# Patient Record
Sex: FEMALE | Race: WHITE | NOT HISPANIC OR LATINO
[De-identification: names, ages, dates, MRNs, and addresses within clinical notes are randomized per-mention and may not be internally consistent; named-entity substitution may affect disease eponyms.]

---

## 2015-07-21 RX ORDER — AMLODIPINE BESYLATE 2.5 MG/1
1 TABLET ORAL
Qty: 0 | Refills: 0 | COMMUNITY
Start: 2015-07-21

## 2017-01-03 ENCOUNTER — RX RENEWAL (OUTPATIENT)
Age: 82
End: 2017-01-03

## 2017-01-06 LAB — INR PPP: 0.98

## 2017-01-10 LAB — INR PPP: 1.13

## 2017-01-17 LAB — INR PPP: 1.15

## 2017-01-24 LAB — INR PPP: 1.13

## 2017-02-01 LAB — INR PPP: 1.7

## 2017-02-07 LAB — INR PPP: 2.47

## 2017-02-15 LAB — INR PPP: 1.78

## 2017-02-26 LAB — INR PPP: 2

## 2017-03-13 LAB — INR PPP: 2.65

## 2017-04-10 LAB — INR PPP: 6.26

## 2017-04-14 LAB — INR PPP: 3.23

## 2017-04-18 ENCOUNTER — OUTPATIENT (OUTPATIENT)
Dept: OUTPATIENT SERVICES | Facility: HOSPITAL | Age: 82
LOS: 1 days | End: 2017-04-18
Payer: MEDICARE

## 2017-04-18 VITALS
DIASTOLIC BLOOD PRESSURE: 82 MMHG | SYSTOLIC BLOOD PRESSURE: 145 MMHG | RESPIRATION RATE: 16 BRPM | TEMPERATURE: 99 F | WEIGHT: 110.01 LBS | HEART RATE: 88 BPM

## 2017-04-18 DIAGNOSIS — Z98.89 OTHER SPECIFIED POSTPROCEDURAL STATES: Chronic | ICD-10-CM

## 2017-04-18 DIAGNOSIS — N20.0 CALCULUS OF KIDNEY: Chronic | ICD-10-CM

## 2017-04-18 DIAGNOSIS — Z96.7 PRESENCE OF OTHER BONE AND TENDON IMPLANTS: Chronic | ICD-10-CM

## 2017-04-18 DIAGNOSIS — D21.22 BENIGN NEOPLASM OF CONNECTIVE AND OTHER SOFT TISSUE OF LEFT LOWER LIMB, INCLUDING HIP: ICD-10-CM

## 2017-04-18 DIAGNOSIS — Z01.818 ENCOUNTER FOR OTHER PREPROCEDURAL EXAMINATION: ICD-10-CM

## 2017-04-18 LAB
ALBUMIN SERPL ELPH-MCNC: 3.5 G/DL — SIGNIFICANT CHANGE UP (ref 3.3–5)
ALP SERPL-CCNC: 80 U/L — SIGNIFICANT CHANGE UP (ref 40–120)
ALT FLD-CCNC: 24 U/L — SIGNIFICANT CHANGE UP (ref 12–78)
ANION GAP SERPL CALC-SCNC: 10 MMOL/L — SIGNIFICANT CHANGE UP (ref 5–17)
AST SERPL-CCNC: 19 U/L — SIGNIFICANT CHANGE UP (ref 15–37)
BILIRUB SERPL-MCNC: 1.2 MG/DL — SIGNIFICANT CHANGE UP (ref 0.2–1.2)
BUN SERPL-MCNC: 28 MG/DL — HIGH (ref 7–23)
CALCIUM SERPL-MCNC: 8.8 MG/DL — SIGNIFICANT CHANGE UP (ref 8.5–10.1)
CHLORIDE SERPL-SCNC: 107 MMOL/L — SIGNIFICANT CHANGE UP (ref 96–108)
CO2 SERPL-SCNC: 24 MMOL/L — SIGNIFICANT CHANGE UP (ref 22–31)
CREAT SERPL-MCNC: 1.2 MG/DL — SIGNIFICANT CHANGE UP (ref 0.5–1.3)
GLUCOSE SERPL-MCNC: 85 MG/DL — SIGNIFICANT CHANGE UP (ref 70–99)
HCT VFR BLD CALC: 35.5 % — SIGNIFICANT CHANGE UP (ref 34.5–45)
HGB BLD-MCNC: 11.2 G/DL — LOW (ref 11.5–15.5)
MCHC RBC-ENTMCNC: 29.7 PG — SIGNIFICANT CHANGE UP (ref 27–34)
MCHC RBC-ENTMCNC: 31.6 GM/DL — LOW (ref 32–36)
MCV RBC AUTO: 94 FL — SIGNIFICANT CHANGE UP (ref 80–100)
PLATELET # BLD AUTO: 177 K/UL — SIGNIFICANT CHANGE UP (ref 150–400)
POTASSIUM SERPL-MCNC: 3.7 MMOL/L — SIGNIFICANT CHANGE UP (ref 3.5–5.3)
POTASSIUM SERPL-SCNC: 3.7 MMOL/L — SIGNIFICANT CHANGE UP (ref 3.5–5.3)
PROT SERPL-MCNC: 7 G/DL — SIGNIFICANT CHANGE UP (ref 6–8.3)
RBC # BLD: 3.78 M/UL — LOW (ref 3.8–5.2)
RBC # FLD: 13.2 % — SIGNIFICANT CHANGE UP (ref 10.3–14.5)
SODIUM SERPL-SCNC: 141 MMOL/L — SIGNIFICANT CHANGE UP (ref 135–145)
WBC # BLD: 9 K/UL — SIGNIFICANT CHANGE UP (ref 3.8–10.5)
WBC # FLD AUTO: 9 K/UL — SIGNIFICANT CHANGE UP (ref 3.8–10.5)

## 2017-04-18 PROCEDURE — 93005 ELECTROCARDIOGRAM TRACING: CPT

## 2017-04-18 PROCEDURE — 71020: CPT | Mod: 26

## 2017-04-18 PROCEDURE — 93010 ELECTROCARDIOGRAM REPORT: CPT | Mod: NC

## 2017-04-18 PROCEDURE — 85027 COMPLETE CBC AUTOMATED: CPT

## 2017-04-18 PROCEDURE — 80053 COMPREHEN METABOLIC PANEL: CPT

## 2017-04-18 PROCEDURE — G0463: CPT

## 2017-04-18 PROCEDURE — 71046 X-RAY EXAM CHEST 2 VIEWS: CPT

## 2017-04-18 RX ORDER — ATORVASTATIN CALCIUM 80 MG/1
1 TABLET, FILM COATED ORAL
Qty: 0 | Refills: 0 | COMMUNITY

## 2017-04-18 NOTE — H&P PST ADULT - PMH
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

## 2017-04-18 NOTE — H&P PST ADULT - NSANTHOSAYNRD_GEN_A_CORE
No. YVONNE screening performed.  STOP BANG Legend: 0-2 = LOW Risk; 3-4 = INTERMEDIATE Risk; 5-8 = HIGH Risk

## 2017-04-18 NOTE — H&P PST ADULT - HISTORY OF PRESENT ILLNESS
Pt first reports to "noticing a growth to left hip area about 2 weeks ago". Pt complaining of "mild discomfort when I touch the growth". Pt denies mass increasing in size. Pt denies inflammation and infection. Pt denies drainage from mass. 83yo female with PMH of AFib, HTN and right leg neuropathy here for PST. Pt first reports to "noticing a growth to left hip area about 2 months ago". Pt complaining of "mild discomfort when I touch the growth". Pt denies mass increasing in size. Pt denies inflammation and infection. Pt denies drainage from mass. Pt electing for excision left hip mass on 4/25/17.

## 2017-04-18 NOTE — H&P PST ADULT - PROBLEM SELECTOR PLAN 1
-admit to general med floors  -acute, INR of 10.16  -Hold AC, given oral vitK  -F/U AM labs  -F/U PT, INR, PTT in AM  -diet Dash/Tlc  -cardio consult- Dr. Laughlin appec recs Excision left hip mass on 4/25/17.   Cardiac clearance needed.   CBC, Comprehensive panel and EKG and CXR ordered.   Pre-op instructions and surgical scrubs given and pt verbalized understanding.

## 2017-04-18 NOTE — H&P PST ADULT - ASSESSMENT
83yo female with benign neoplasm of connective tissue and other soft tissue of left lower limb, including hip.

## 2017-04-18 NOTE — H&P PST ADULT - PSH
Elective surgery  ("Twisted bowel", 2014)  Elective surgery  (Exision of cyst on liver, 1985)  H/O cerebral aneurysm repair  Brain clips (1978(  PVD (peripheral vascular disease)  s/p RLE bypass x 3, most recent 3/2012 Right external iliac to PT bypass w/ PTFE (2012)  Renal stone  Cysto stent placement 10/1/2014  S/P cataract surgery  (Left eye)  S/P ORIF (open reduction internal fixation) fracture  Left hip fx (2012) & R hip fx (2013)  S/P FRED (total abdominal hysterectomy)  (1987) Elective surgery  ("Twisted bowel", 2014)  Elective surgery  (Exision of cyst on liver, 1985)  H/O cerebral aneurysm repair  Brain clips (1978(  PVD (peripheral vascular disease)  s/p RLE bypass x 3, most recent 3/2012 Right external iliac to PT bypass w/ PTFE (2012)  Renal stone  Cysto stent placement 10/1/2014  S/P cataract surgery  (Left eye)  S/P ORIF (open reduction internal fixation) fracture  Left hip fx (2012) & R hip fx (2013)  S/P FRED (total abdominal hysterectomy)  (1987, Hx of "ovarian cancer?")

## 2017-04-18 NOTE — H&P PST ADULT - GASTROINTESTINAL DETAILS
bowel sounds normal/no guarding/normal/no distention/no rebound tenderness/no rigidity/soft/nontender

## 2017-04-18 NOTE — H&P PST ADULT - RS GEN PE MLT RESP DETAILS PC
respirations non-labored/no wheezes/breath sounds equal/normal/good air movement/airway patent/no rhonchi/no rales

## 2017-04-18 NOTE — H&P PST ADULT - NEUROLOGICAL COMMENTS
Right lower right lower neuropathy Right lower right leg neuropathy - "chronic after vascular surgery"

## 2017-04-18 NOTE — H&P PST ADULT - NEGATIVE CARDIOVASCULAR SYMPTOMS
no paroxysmal nocturnal dyspnea/no palpitations/no orthopnea/no peripheral edema/no claudication/no chest pain/no dyspnea on exertion

## 2017-04-18 NOTE — H&P PST ADULT - NEUROLOGICAL
details… detailed exam Alert & oriented; no sensory, motor or coordination deficits, normal reflexes

## 2017-04-18 NOTE — H&P PST ADULT - MUSCULOSKELETAL
details… detailed exam normal strength/normal/no calf tenderness No joint pain, swelling or deformity; no limitation of movement

## 2017-04-19 ENCOUNTER — NON-APPOINTMENT (OUTPATIENT)
Age: 82
End: 2017-04-19

## 2017-04-19 ENCOUNTER — APPOINTMENT (OUTPATIENT)
Dept: CARDIOLOGY | Facility: CLINIC | Age: 82
End: 2017-04-19

## 2017-04-19 VITALS
WEIGHT: 112 LBS | BODY MASS INDEX: 21.14 KG/M2 | DIASTOLIC BLOOD PRESSURE: 78 MMHG | HEART RATE: 83 BPM | HEIGHT: 61 IN | OXYGEN SATURATION: 96 % | SYSTOLIC BLOOD PRESSURE: 136 MMHG

## 2017-04-20 PROBLEM — I34.1 NONRHEUMATIC MITRAL (VALVE) PROLAPSE: Chronic | Status: ACTIVE | Noted: 2017-04-18

## 2017-04-20 PROBLEM — D21.9 BENIGN NEOPLASM OF CONNECTIVE AND OTHER SOFT TISSUE, UNSPECIFIED: Chronic | Status: ACTIVE | Noted: 2017-04-18

## 2017-04-21 LAB — INR PPP: 1.13

## 2017-04-24 ENCOUNTER — RESULT REVIEW (OUTPATIENT)
Age: 82
End: 2017-04-24

## 2017-04-24 RX ORDER — SODIUM CHLORIDE 9 MG/ML
1000 INJECTION, SOLUTION INTRAVENOUS
Qty: 0 | Refills: 0 | Status: DISCONTINUED | OUTPATIENT
Start: 2017-04-25 | End: 2017-04-25

## 2017-04-25 ENCOUNTER — TRANSCRIPTION ENCOUNTER (OUTPATIENT)
Age: 82
End: 2017-04-25

## 2017-04-25 ENCOUNTER — OUTPATIENT (OUTPATIENT)
Dept: OUTPATIENT SERVICES | Facility: HOSPITAL | Age: 82
LOS: 1 days | Discharge: HOME | End: 2017-04-25
Payer: MEDICARE

## 2017-04-25 VITALS
TEMPERATURE: 98 F | SYSTOLIC BLOOD PRESSURE: 122 MMHG | DIASTOLIC BLOOD PRESSURE: 68 MMHG | OXYGEN SATURATION: 99 % | HEART RATE: 64 BPM | RESPIRATION RATE: 16 BRPM

## 2017-04-25 DIAGNOSIS — Z98.89 OTHER SPECIFIED POSTPROCEDURAL STATES: Chronic | ICD-10-CM

## 2017-04-25 DIAGNOSIS — D21.22 BENIGN NEOPLASM OF CONNECTIVE AND OTHER SOFT TISSUE OF LEFT LOWER LIMB, INCLUDING HIP: ICD-10-CM

## 2017-04-25 DIAGNOSIS — N20.0 CALCULUS OF KIDNEY: Chronic | ICD-10-CM

## 2017-04-25 DIAGNOSIS — Z41.9 ENCOUNTER FOR PROCEDURE FOR PURPOSES OTHER THAN REMEDYING HEALTH STATE, UNSPECIFIED: Chronic | ICD-10-CM

## 2017-04-25 DIAGNOSIS — Z98.49 CATARACT EXTRACTION STATUS, UNSPECIFIED EYE: Chronic | ICD-10-CM

## 2017-04-25 DIAGNOSIS — Z96.7 PRESENCE OF OTHER BONE AND TENDON IMPLANTS: Chronic | ICD-10-CM

## 2017-04-25 DIAGNOSIS — D21.9 BENIGN NEOPLASM OF CONNECTIVE AND OTHER SOFT TISSUE, UNSPECIFIED: ICD-10-CM

## 2017-04-25 LAB
APTT BLD: 32.9 SEC — SIGNIFICANT CHANGE UP (ref 27.5–37.4)
INR BLD: 1.04 RATIO — SIGNIFICANT CHANGE UP (ref 0.88–1.16)
PROTHROM AB SERPL-ACNC: 11.3 SEC — SIGNIFICANT CHANGE UP (ref 9.8–12.7)

## 2017-04-25 PROCEDURE — 88304 TISSUE EXAM BY PATHOLOGIST: CPT | Mod: 26

## 2017-04-25 PROCEDURE — 27045 EXC HIP/PELV TUM DEEP 5 CM/>: CPT

## 2017-04-25 PROCEDURE — 88304 TISSUE EXAM BY PATHOLOGIST: CPT

## 2017-04-25 PROCEDURE — 85610 PROTHROMBIN TIME: CPT

## 2017-04-25 PROCEDURE — 85730 THROMBOPLASTIN TIME PARTIAL: CPT

## 2017-04-25 RX ORDER — CEFAZOLIN SODIUM 1 G
1000 VIAL (EA) INJECTION ONCE
Qty: 0 | Refills: 0 | Status: COMPLETED | OUTPATIENT
Start: 2017-04-25 | End: 2017-04-25

## 2017-04-25 RX ORDER — SODIUM CHLORIDE 9 MG/ML
1000 INJECTION, SOLUTION INTRAVENOUS
Qty: 0 | Refills: 0 | Status: DISCONTINUED | OUTPATIENT
Start: 2017-04-25 | End: 2017-04-25

## 2017-04-25 RX ORDER — ONDANSETRON 8 MG/1
4 TABLET, FILM COATED ORAL ONCE
Qty: 0 | Refills: 0 | Status: DISCONTINUED | OUTPATIENT
Start: 2017-04-25 | End: 2017-04-25

## 2017-04-25 RX ORDER — MORPHINE SULFATE 50 MG/1
2 CAPSULE, EXTENDED RELEASE ORAL
Qty: 0 | Refills: 0 | Status: DISCONTINUED | OUTPATIENT
Start: 2017-04-25 | End: 2017-04-25

## 2017-04-25 RX ADMIN — SODIUM CHLORIDE 40 MILLILITER(S): 9 INJECTION, SOLUTION INTRAVENOUS at 09:13

## 2017-04-25 RX ADMIN — SODIUM CHLORIDE 50 MILLILITER(S): 9 INJECTION, SOLUTION INTRAVENOUS at 12:05

## 2017-04-25 NOTE — ASU DISCHARGE PLAN (ADULT/PEDIATRIC). - SPECIAL INSTRUCTIONS
Keep dressing clean, dry and intact x 24 hrs. Remove dressing after 24 hrs and begin showering as usual. Do NOT remove steri-strips. Do not scrub or soak incision site. Pat dry. NO tub baths, NO swimming pools. Apply waterproof ice pack to incision area 20 mins on, 20 mins off to help decrease pain and swelling.

## 2017-04-25 NOTE — ASU DISCHARGE PLAN (ADULT/PEDIATRIC). - NURSING INSTRUCTIONS
Wash hands frequently.  Continue deep breathing and coughing as demonstrated and practiced in recovery room.

## 2017-04-25 NOTE — ASU DISCHARGE PLAN (ADULT/PEDIATRIC). - MEDICATION SUMMARY - MEDICATIONS TO TAKE
I will START or STAY ON the medications listed below when I get home from the hospital:    Rolling Walker: Diagnosis: Weakness  -- Indication: For Home equipment    traMADol 50 mg oral tablet  --  by mouth 2 times a day  -- Indication: For Home med/Pain med    aspirin 81 mg oral delayed release tablet  -- 1 tab(s) by mouth once a day  -- Indication: For Home med    losartan 50 mg oral tablet  -- 1 tab(s) by mouth once a day (at bedtime)  -- Indication: For Home med    warfarin 2.5 mg oral tablet  --  by mouth once a day (at bedtime)  -- Indication: For Home med    gabapentin 100 mg oral capsule  --  by mouth 2 times a day  -- Indication: For Home med    atorvastatin 40 mg oral tablet  -- 1 tab(s) by mouth once a day (at bedtime)  -- Indication: For Home med    metoprolol tartrate 100 mg oral tablet  -- 1 tab(s) by mouth 2 times a day  -- Indication: For Home med    amLODIPine 5 mg oral tablet  -- 1 tab(s) by mouth once a day (at bedtime)  -- Indication: For Home med    omeprazole 40 mg oral delayed release capsule  -- 1 cap(s) by mouth once a day (at bedtime)  -- Indication: For Home med    multivitamin  --   once a day  -- Indication: For Home med I will START or STAY ON the medications listed below when I get home from the hospital:    Rolling Walker: Diagnosis: Weakness  -- Indication: For Home equipment    traMADol 50 mg oral tablet  --  by mouth 2 times a day  -- Indication: For Home med/Pain med    aspirin 81 mg oral delayed release tablet  -- 1 tab(s) by mouth once a day  -- Indication: For Home med    acetaminophen-hydrocodone 325 mg-5 mg oral tablet  -- 1 tab(s) by mouth every 4-6 hours as needed for pain. MDD:4  -- Caution federal law prohibits the transfer of this drug to any person other  than the person for whom it was prescribed.  May cause drowsiness.  Alcohol may intensify this effect.  Use care when operating dangerous machinery.  This product contains acetaminophen.  Do not use  with any other product containing acetaminophen to prevent possible liver damage.  Using more of this medication than prescribed may cause serious breathing problems.    -- Indication: For Pain med- DO NOT TAKE WITH TRAMADOL    Tylenol 325 mg oral tablet  -- 2 tab(s) by mouth every 4 hours as needed for mild pain. (** Max daily dose of Tylenol should not exceed 3200mg/24 hrs-Note Tylenol in Norco)  -- Indication: For Mild pain    losartan 50 mg oral tablet  -- 1 tab(s) by mouth once a day (at bedtime)  -- Indication: For Home med    Milk of Magnesia  --  30 cc by mouth daily as needed for constipation while on norco for pain.  -- Indication: For Constipation    warfarin 2.5 mg oral tablet  --  by mouth once a day (at bedtime)  -- Indication: For Home med    gabapentin 100 mg oral capsule  --  by mouth 2 times a day  -- Indication: For Home med    atorvastatin 40 mg oral tablet  -- 1 tab(s) by mouth once a day (at bedtime)  -- Indication: For Home med    metoprolol tartrate 100 mg oral tablet  -- 1 tab(s) by mouth 2 times a day  -- Indication: For Home med    amLODIPine 5 mg oral tablet  -- 1 tab(s) by mouth once a day (at bedtime)  -- Indication: For Home med    omeprazole 40 mg oral delayed release capsule  -- 1 cap(s) by mouth once a day (at bedtime)  -- Indication: For Home med    multivitamin  --   once a day  -- Indication: For Home med

## 2017-04-25 NOTE — ASU DISCHARGE PLAN (ADULT/PEDIATRIC). - NOTIFY
Pain not relieved by Medications/Swelling that continues/Persistent Nausea and Vomiting/Bleeding that does not stop/Fever greater than 101 Persistent Nausea and Vomiting/Bleeding that does not stop/Increased Irritability or Sluggishness/Fever greater than 101/Inability to Tolerate Liquids or Foods/Swelling that continues/Pain not relieved by Medications

## 2017-04-25 NOTE — ASU PATIENT PROFILE, ADULT - PMH
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

## 2017-04-25 NOTE — ASU PATIENT PROFILE, ADULT - PSH
Elective surgery  ("Twisted bowel", 2014)  Elective surgery  (Exision of cyst on liver, 1985)  H/O cerebral aneurysm repair  Brain clips (1978(  PVD (peripheral vascular disease)  s/p RLE bypass x 3, most recent 3/2012 Right external iliac to PT bypass w/ PTFE (2012)  Renal stone  Cysto stent placement 10/1/2014  S/P cataract surgery  (Left eye)  S/P ORIF (open reduction internal fixation) fracture  Left hip fx (2012) & R hip fx (2013)  S/P FRED (total abdominal hysterectomy)  (1987, Hx of "ovarian cancer?")

## 2017-04-26 LAB — SURGICAL PATHOLOGY FINAL REPORT - CH: SIGNIFICANT CHANGE UP

## 2017-04-28 DIAGNOSIS — Z87.891 PERSONAL HISTORY OF NICOTINE DEPENDENCE: ICD-10-CM

## 2017-04-28 DIAGNOSIS — I48.0 PAROXYSMAL ATRIAL FIBRILLATION: ICD-10-CM

## 2017-04-28 DIAGNOSIS — M06.9 RHEUMATOID ARTHRITIS, UNSPECIFIED: ICD-10-CM

## 2017-04-28 DIAGNOSIS — I10 ESSENTIAL (PRIMARY) HYPERTENSION: ICD-10-CM

## 2017-04-28 DIAGNOSIS — E78.00 PURE HYPERCHOLESTEROLEMIA, UNSPECIFIED: ICD-10-CM

## 2017-04-28 DIAGNOSIS — J45.909 UNSPECIFIED ASTHMA, UNCOMPLICATED: ICD-10-CM

## 2017-04-28 DIAGNOSIS — D21.22 BENIGN NEOPLASM OF CONNECTIVE AND OTHER SOFT TISSUE OF LEFT LOWER LIMB, INCLUDING HIP: ICD-10-CM

## 2017-04-28 DIAGNOSIS — I34.0 NONRHEUMATIC MITRAL (VALVE) INSUFFICIENCY: ICD-10-CM

## 2017-04-28 DIAGNOSIS — E78.5 HYPERLIPIDEMIA, UNSPECIFIED: ICD-10-CM

## 2017-04-28 DIAGNOSIS — Z79.01 LONG TERM (CURRENT) USE OF ANTICOAGULANTS: ICD-10-CM

## 2017-04-28 DIAGNOSIS — Z79.82 LONG TERM (CURRENT) USE OF ASPIRIN: ICD-10-CM

## 2017-04-28 DIAGNOSIS — Z86.73 PERSONAL HISTORY OF TRANSIENT ISCHEMIC ATTACK (TIA), AND CEREBRAL INFARCTION WITHOUT RESIDUAL DEFICITS: ICD-10-CM

## 2017-05-03 ENCOUNTER — APPOINTMENT (OUTPATIENT)
Dept: CARDIOLOGY | Facility: CLINIC | Age: 82
End: 2017-05-03

## 2017-05-05 LAB — INR PPP: 1.28

## 2017-05-11 LAB — INR PPP: 3.34

## 2017-05-18 LAB — INR PPP: 6.29

## 2017-05-23 LAB — INR PPP: 1.88

## 2017-05-31 LAB — INR PPP: 1.26

## 2017-06-14 ENCOUNTER — INPATIENT (INPATIENT)
Facility: HOSPITAL | Age: 82
LOS: 1 days | Discharge: HOME | DRG: 301 | End: 2017-06-16
Attending: FAMILY MEDICINE | Admitting: FAMILY MEDICINE
Payer: MEDICARE

## 2017-06-14 VITALS
OXYGEN SATURATION: 97 % | RESPIRATION RATE: 14 BRPM | WEIGHT: 110.01 LBS | SYSTOLIC BLOOD PRESSURE: 117 MMHG | DIASTOLIC BLOOD PRESSURE: 81 MMHG | HEART RATE: 71 BPM | TEMPERATURE: 98 F | HEIGHT: 60 IN

## 2017-06-14 DIAGNOSIS — Z29.9 ENCOUNTER FOR PROPHYLACTIC MEASURES, UNSPECIFIED: ICD-10-CM

## 2017-06-14 DIAGNOSIS — Z98.89 OTHER SPECIFIED POSTPROCEDURAL STATES: Chronic | ICD-10-CM

## 2017-06-14 DIAGNOSIS — I48.91 UNSPECIFIED ATRIAL FIBRILLATION: ICD-10-CM

## 2017-06-14 DIAGNOSIS — R53.1 WEAKNESS: ICD-10-CM

## 2017-06-14 DIAGNOSIS — N32.81 OVERACTIVE BLADDER: ICD-10-CM

## 2017-06-14 DIAGNOSIS — I10 ESSENTIAL (PRIMARY) HYPERTENSION: ICD-10-CM

## 2017-06-14 DIAGNOSIS — Z41.9 ENCOUNTER FOR PROCEDURE FOR PURPOSES OTHER THAN REMEDYING HEALTH STATE, UNSPECIFIED: Chronic | ICD-10-CM

## 2017-06-14 DIAGNOSIS — Z98.49 CATARACT EXTRACTION STATUS, UNSPECIFIED EYE: Chronic | ICD-10-CM

## 2017-06-14 DIAGNOSIS — M06.9 RHEUMATOID ARTHRITIS, UNSPECIFIED: ICD-10-CM

## 2017-06-14 DIAGNOSIS — Z96.7 PRESENCE OF OTHER BONE AND TENDON IMPLANTS: Chronic | ICD-10-CM

## 2017-06-14 DIAGNOSIS — N20.0 CALCULUS OF KIDNEY: Chronic | ICD-10-CM

## 2017-06-14 DIAGNOSIS — I63.9 CEREBRAL INFARCTION, UNSPECIFIED: ICD-10-CM

## 2017-06-14 DIAGNOSIS — K21.9 GASTRO-ESOPHAGEAL REFLUX DISEASE WITHOUT ESOPHAGITIS: ICD-10-CM

## 2017-06-14 DIAGNOSIS — I74.3 EMBOLISM AND THROMBOSIS OF ARTERIES OF THE LOWER EXTREMITIES: ICD-10-CM

## 2017-06-14 DIAGNOSIS — E78.5 HYPERLIPIDEMIA, UNSPECIFIED: ICD-10-CM

## 2017-06-14 LAB
ALBUMIN SERPL ELPH-MCNC: 3.4 G/DL — SIGNIFICANT CHANGE UP (ref 3.3–5)
ALP SERPL-CCNC: 73 U/L — SIGNIFICANT CHANGE UP (ref 40–120)
ALT FLD-CCNC: 15 U/L — SIGNIFICANT CHANGE UP (ref 12–78)
ANION GAP SERPL CALC-SCNC: 7 MMOL/L — SIGNIFICANT CHANGE UP (ref 5–17)
APPEARANCE UR: CLEAR — SIGNIFICANT CHANGE UP
APTT BLD: 45.3 SEC — HIGH (ref 27.5–37.4)
AST SERPL-CCNC: 17 U/L — SIGNIFICANT CHANGE UP (ref 15–37)
BACTERIA # UR AUTO: ABNORMAL
BASOPHILS # BLD AUTO: 0.1 K/UL — SIGNIFICANT CHANGE UP (ref 0–0.2)
BASOPHILS NFR BLD AUTO: 0.9 % — SIGNIFICANT CHANGE UP (ref 0–2)
BILIRUB SERPL-MCNC: 1.1 MG/DL — SIGNIFICANT CHANGE UP (ref 0.2–1.2)
BILIRUB UR-MCNC: NEGATIVE — SIGNIFICANT CHANGE UP
BUN SERPL-MCNC: 36 MG/DL — HIGH (ref 7–23)
CALCIUM SERPL-MCNC: 8.8 MG/DL — SIGNIFICANT CHANGE UP (ref 8.5–10.1)
CHLORIDE SERPL-SCNC: 106 MMOL/L — SIGNIFICANT CHANGE UP (ref 96–108)
CO2 SERPL-SCNC: 24 MMOL/L — SIGNIFICANT CHANGE UP (ref 22–31)
COLOR SPEC: SIGNIFICANT CHANGE UP
COMMENT - URINE: SIGNIFICANT CHANGE UP
CREAT SERPL-MCNC: 1.2 MG/DL — SIGNIFICANT CHANGE UP (ref 0.5–1.3)
DIFF PNL FLD: NEGATIVE — SIGNIFICANT CHANGE UP
EOSINOPHIL # BLD AUTO: 0.2 K/UL — SIGNIFICANT CHANGE UP (ref 0–0.5)
EOSINOPHIL NFR BLD AUTO: 2.6 % — SIGNIFICANT CHANGE UP (ref 0–6)
EPI CELLS # UR: SIGNIFICANT CHANGE UP
GLUCOSE SERPL-MCNC: 85 MG/DL — SIGNIFICANT CHANGE UP (ref 70–99)
GLUCOSE UR QL: NEGATIVE — SIGNIFICANT CHANGE UP
HCT VFR BLD CALC: 32.3 % — LOW (ref 34.5–45)
HGB BLD-MCNC: 10.4 G/DL — LOW (ref 11.5–15.5)
INR BLD: 1.85 RATIO — HIGH (ref 0.88–1.16)
KETONES UR-MCNC: NEGATIVE — SIGNIFICANT CHANGE UP
LACTATE SERPL-SCNC: 0.9 MMOL/L — SIGNIFICANT CHANGE UP (ref 0.7–2)
LEUKOCYTE ESTERASE UR-ACNC: ABNORMAL
LYMPHOCYTES # BLD AUTO: 1.7 K/UL — SIGNIFICANT CHANGE UP (ref 1–3.3)
LYMPHOCYTES # BLD AUTO: 18.6 % — SIGNIFICANT CHANGE UP (ref 13–44)
MCHC RBC-ENTMCNC: 29.9 PG — SIGNIFICANT CHANGE UP (ref 27–34)
MCHC RBC-ENTMCNC: 32.2 GM/DL — SIGNIFICANT CHANGE UP (ref 32–36)
MCV RBC AUTO: 92.9 FL — SIGNIFICANT CHANGE UP (ref 80–100)
MONOCYTES # BLD AUTO: 0.9 K/UL — SIGNIFICANT CHANGE UP (ref 0–0.9)
MONOCYTES NFR BLD AUTO: 10 % — HIGH (ref 1–9)
NEUTROPHILS # BLD AUTO: 6.1 K/UL — SIGNIFICANT CHANGE UP (ref 1.8–7.4)
NEUTROPHILS NFR BLD AUTO: 67.9 % — SIGNIFICANT CHANGE UP (ref 43–77)
NITRITE UR-MCNC: POSITIVE
PH UR: 6.5 — SIGNIFICANT CHANGE UP (ref 5–8)
PLATELET # BLD AUTO: 180 K/UL — SIGNIFICANT CHANGE UP (ref 150–400)
POTASSIUM SERPL-MCNC: 4.3 MMOL/L — SIGNIFICANT CHANGE UP (ref 3.5–5.3)
POTASSIUM SERPL-SCNC: 4.3 MMOL/L — SIGNIFICANT CHANGE UP (ref 3.5–5.3)
PROT SERPL-MCNC: 6.6 G/DL — SIGNIFICANT CHANGE UP (ref 6–8.3)
PROT UR-MCNC: NEGATIVE — SIGNIFICANT CHANGE UP
PROTHROM AB SERPL-ACNC: 20.4 SEC — HIGH (ref 9.8–12.7)
RBC # BLD: 3.48 M/UL — LOW (ref 3.8–5.2)
RBC # FLD: 12.8 % — SIGNIFICANT CHANGE UP (ref 10.3–14.5)
SODIUM SERPL-SCNC: 137 MMOL/L — SIGNIFICANT CHANGE UP (ref 135–145)
SP GR SPEC: 1 — LOW (ref 1.01–1.02)
UROBILINOGEN FLD QL: NEGATIVE — SIGNIFICANT CHANGE UP
WBC # BLD: 9 K/UL — SIGNIFICANT CHANGE UP (ref 3.8–10.5)
WBC # FLD AUTO: 9 K/UL — SIGNIFICANT CHANGE UP (ref 3.8–10.5)
WBC UR QL: ABNORMAL

## 2017-06-14 PROCEDURE — 93926 LOWER EXTREMITY STUDY: CPT | Mod: 26,RT

## 2017-06-14 PROCEDURE — 99285 EMERGENCY DEPT VISIT HI MDM: CPT

## 2017-06-14 PROCEDURE — 71010: CPT | Mod: 26

## 2017-06-14 PROCEDURE — 99223 1ST HOSP IP/OBS HIGH 75: CPT | Mod: AI,GC

## 2017-06-14 PROCEDURE — 93010 ELECTROCARDIOGRAM REPORT: CPT

## 2017-06-14 PROCEDURE — 99223 1ST HOSP IP/OBS HIGH 75: CPT

## 2017-06-14 RX ORDER — LOSARTAN POTASSIUM 100 MG/1
50 TABLET, FILM COATED ORAL DAILY
Qty: 0 | Refills: 0 | Status: DISCONTINUED | OUTPATIENT
Start: 2017-06-14 | End: 2017-06-16

## 2017-06-14 RX ORDER — GABAPENTIN 400 MG/1
0 CAPSULE ORAL
Qty: 0 | Refills: 0 | COMMUNITY

## 2017-06-14 RX ORDER — WARFARIN SODIUM 2.5 MG/1
3 TABLET ORAL ONCE
Qty: 0 | Refills: 0 | Status: COMPLETED | OUTPATIENT
Start: 2017-06-14 | End: 2017-06-14

## 2017-06-14 RX ORDER — ACETAMINOPHEN 500 MG
650 TABLET ORAL EVERY 6 HOURS
Qty: 0 | Refills: 0 | Status: DISCONTINUED | OUTPATIENT
Start: 2017-06-14 | End: 2017-06-16

## 2017-06-14 RX ORDER — OXYBUTYNIN CHLORIDE 5 MG
5 TABLET ORAL
Qty: 0 | Refills: 0 | Status: DISCONTINUED | OUTPATIENT
Start: 2017-06-14 | End: 2017-06-16

## 2017-06-14 RX ORDER — TRAMADOL HYDROCHLORIDE 50 MG/1
50 TABLET ORAL
Qty: 0 | Refills: 0 | Status: DISCONTINUED | OUTPATIENT
Start: 2017-06-14 | End: 2017-06-15

## 2017-06-14 RX ORDER — TRAMADOL HYDROCHLORIDE 50 MG/1
25 TABLET ORAL ONCE
Qty: 0 | Refills: 0 | Status: DISCONTINUED | OUTPATIENT
Start: 2017-06-14 | End: 2017-06-14

## 2017-06-14 RX ORDER — METOPROLOL TARTRATE 50 MG
100 TABLET ORAL
Qty: 0 | Refills: 0 | Status: DISCONTINUED | OUTPATIENT
Start: 2017-06-14 | End: 2017-06-16

## 2017-06-14 RX ORDER — SODIUM CHLORIDE 9 MG/ML
1000 INJECTION INTRAMUSCULAR; INTRAVENOUS; SUBCUTANEOUS ONCE
Qty: 0 | Refills: 0 | Status: COMPLETED | OUTPATIENT
Start: 2017-06-14 | End: 2017-06-14

## 2017-06-14 RX ORDER — MAGNESIUM HYDROXIDE 400 MG/1
0 TABLET, CHEWABLE ORAL
Qty: 0 | Refills: 0 | COMMUNITY

## 2017-06-14 RX ORDER — PANTOPRAZOLE SODIUM 20 MG/1
40 TABLET, DELAYED RELEASE ORAL
Qty: 0 | Refills: 0 | Status: DISCONTINUED | OUTPATIENT
Start: 2017-06-14 | End: 2017-06-16

## 2017-06-14 RX ORDER — ENOXAPARIN SODIUM 100 MG/ML
50 INJECTION SUBCUTANEOUS EVERY 24 HOURS
Qty: 0 | Refills: 0 | Status: DISCONTINUED | OUTPATIENT
Start: 2017-06-14 | End: 2017-06-15

## 2017-06-14 RX ORDER — AMLODIPINE BESYLATE 2.5 MG/1
5 TABLET ORAL AT BEDTIME
Qty: 0 | Refills: 0 | Status: DISCONTINUED | OUTPATIENT
Start: 2017-06-14 | End: 2017-06-16

## 2017-06-14 RX ORDER — ASPIRIN/CALCIUM CARB/MAGNESIUM 324 MG
81 TABLET ORAL DAILY
Qty: 0 | Refills: 0 | Status: DISCONTINUED | OUTPATIENT
Start: 2017-06-14 | End: 2017-06-16

## 2017-06-14 RX ORDER — GABAPENTIN 400 MG/1
100 CAPSULE ORAL THREE TIMES A DAY
Qty: 0 | Refills: 0 | Status: DISCONTINUED | OUTPATIENT
Start: 2017-06-14 | End: 2017-06-16

## 2017-06-14 RX ORDER — ATORVASTATIN CALCIUM 80 MG/1
40 TABLET, FILM COATED ORAL AT BEDTIME
Qty: 0 | Refills: 0 | Status: DISCONTINUED | OUTPATIENT
Start: 2017-06-14 | End: 2017-06-16

## 2017-06-14 RX ADMIN — GABAPENTIN 100 MILLIGRAM(S): 400 CAPSULE ORAL at 22:21

## 2017-06-14 RX ADMIN — TRAMADOL HYDROCHLORIDE 25 MILLIGRAM(S): 50 TABLET ORAL at 23:00

## 2017-06-14 RX ADMIN — TRAMADOL HYDROCHLORIDE 50 MILLIGRAM(S): 50 TABLET ORAL at 17:07

## 2017-06-14 RX ADMIN — ATORVASTATIN CALCIUM 40 MILLIGRAM(S): 80 TABLET, FILM COATED ORAL at 21:50

## 2017-06-14 RX ADMIN — ENOXAPARIN SODIUM 50 MILLIGRAM(S): 100 INJECTION SUBCUTANEOUS at 16:22

## 2017-06-14 RX ADMIN — WARFARIN SODIUM 3 MILLIGRAM(S): 2.5 TABLET ORAL at 21:50

## 2017-06-14 RX ADMIN — SODIUM CHLORIDE 1000 MILLILITER(S): 9 INJECTION INTRAMUSCULAR; INTRAVENOUS; SUBCUTANEOUS at 11:39

## 2017-06-14 RX ADMIN — Medication 5 MILLIGRAM(S): at 17:07

## 2017-06-14 RX ADMIN — TRAMADOL HYDROCHLORIDE 25 MILLIGRAM(S): 50 TABLET ORAL at 22:21

## 2017-06-14 RX ADMIN — Medication 100 MILLIGRAM(S): at 17:07

## 2017-06-14 RX ADMIN — AMLODIPINE BESYLATE 5 MILLIGRAM(S): 2.5 TABLET ORAL at 21:50

## 2017-06-14 NOTE — H&P ADULT - ASSESSMENT
82F PMHx Afib on Coumadin, HLD, CVA, MVP, OA, HTN, PVD (s/p RLE bypassx3, most recently 2012), Neuropathy, GERD presents with right leg pain for past for 2 days a/w arterial occlusion

## 2017-06-14 NOTE — ED PROVIDER NOTE - PROGRESS NOTE DETAILS
Dr Dr Sánchez, should xfer to [place] to see her prior surgeons. [facility]  Xfer center with Dr Araya, states there is no need for urgent surg for this pt based on prior hx and prev exams, refusing xfer at this time. Daniel pt, still feeling weak,with some pain, unable to ambulate at home. Will need rehab.   Daniel Sánchez, will see pt, no anticoag at this time. Daniel Lucas, will admit/

## 2017-06-14 NOTE — H&P ADULT - PROBLEM SELECTOR PLAN 9
pt takes vesicare at home - will use conversion of Oxybutynin chronic  pt takes vesicare at home - will use conversion of Oxybutynin

## 2017-06-14 NOTE — ED PROVIDER NOTE - PHYSICAL EXAMINATION
R leg slight cool compared with Left. Good cap refill. Unable to palp DP / PT pulses. Nl dist str/sens equal bl

## 2017-06-14 NOTE — H&P ADULT - NSHPPHYSICALEXAM_GEN_ALL_CORE
General: Well developed, well nourished, NAD  HEENT: NCAT, PERRLA, EOMI bl, moist mucous membranes   Neck: Supple, nontender, no mass  Neurology: A&Ox3, nonfocal, CN II-XII grossly intact, sensation intact, no gait abnormalities   Respiratory: CTA B/L, No W/R/R  CV: RRR, +S1/S2, no murmurs, rubs or gallops  Abdominal: Soft, NT, ND +BSx4  Extremities: No C/C/E, + peripheral pulses  MSK: Normal ROM, no joint erythema or warmth, no joint swelling   Skin: warm, dry, normal color, no rash or abnormal lesions General: Well developed, well nourished, NAD  HEENT: NCAT, PERRLA, EOMI bl, moist mucous membranes   Neck: Supple, nontender, no mass  Neurology: A&Ox3, nonfocal, CN II-XII grossly intact, sensation intact, no gait abnormalities   Respiratory: CTA B/L, No W/R/R  CV: RRR, +S1/S2, no murmurs, rubs or gallops  Abdominal: Soft, NT, ND +BSx4  Extremities: No C/C/E, + peripheral pulses  MSK: + pain to palpation on right lower extremity, firm on right outer calf where previous surgery was, next to scar. Necrotic toe on right second digit, Normal ROM, no joint erythema or warmth, no joint swelling   Skin: warm, dry, normal color, no rash or abnormal lesions

## 2017-06-14 NOTE — ED ADULT NURSE REASSESSMENT NOTE - NS ED NURSE REASSESS COMMENT FT1
patient ambulated to bathroom with nursing assistant. patient denies pain, tingling/ numbing in lower extremities, chest pain, dizziness, nausea.   respirations even and unlabored. skin warm and dry. IV intact.

## 2017-06-14 NOTE — H&P ADULT - NSHPREVIEWOFSYSTEMS_GEN_ALL_CORE
CONSTITUTIONAL: No weakness, fevers or chills  EYES/ENT: No visual changes;  No vertigo or throat pain   NECK: No pain or stiffness  RESPIRATORY: No cough, wheezing, hemoptysis; No shortness of breath  CARDIOVASCULAR: No chest pain or palpitations  GASTROINTESTINAL: No abdominal or epigastric pain. No nausea, vomiting, or hematemesis; No diarrhea or constipation. No melena or hematochezia.  GENITOURINARY: No dysuria, frequency or hematuria  NEUROLOGICAL: No numbness or weakness  SKIN: No itching, burning, rashes, or lesions   All other review of systems is negative unless indicated above. CONSTITUTIONAL: No weakness, fevers or chills  EYES/ENT: No visual changes;  No vertigo or throat pain   NECK: No pain or stiffness  RESPIRATORY: No cough, wheezing, hemoptysis; No shortness of breath  CARDIOVASCULAR: No chest pain or palpitations  GASTROINTESTINAL: No abdominal or epigastric pain. No nausea, vomiting, or hematemesis; No diarrhea or constipation. No melena or hematochezia.  GENITOURINARY: No dysuria, frequency or hematuria  NEUROLOGICAL: No numbness or weakness  SKIN: No itching, burning, rashes, or lesions   MSK - right leg pain  All other review of systems is negative unless indicated above. CONSTITUTIONAL: + weakness, denies fevers or chills  EYES/ENT: No visual changes;  No vertigo or throat pain   NECK: No pain or stiffness  RESPIRATORY: No cough, wheezing, hemoptysis; No shortness of breath  CARDIOVASCULAR: No chest pain or palpitations  GASTROINTESTINAL: No abdominal or epigastric pain. No nausea, vomiting, or hematemesis; No diarrhea or constipation. No melena or hematochezia.  GENITOURINARY: No dysuria, frequency or hematuria  NEUROLOGICAL: No numbness or weakness  SKIN: No itching, burning, rashes, or lesions   MSK - right leg pain  All other review of systems is negative unless indicated above.

## 2017-06-14 NOTE — ED ADULT NURSE NOTE - OBJECTIVE STATEMENT
81yo female presents to ED alert and oriented X3.  patient c/o left foot pain radiating to left knee since last night, left foot cool to touch, positive b/l peal pulses noted. patient denies trauma. patient has had vascular surgery on left lower extremity approx. 5 years ago.  patient denies chest pain, fever, chills, shortness of breath, dizziness, nausea, vomiting, diarrhea. 81yo female presents to ED alert and oriented X3.  patient c/o right foot pain radiating to left knee since last night, right foot cool to touch, positive b/l peal pulses noted. patient denies trauma. patient has had vascular surgery on right lower extremity approx. 5 years ago.  patient denies chest pain, fever, chills, shortness of breath, dizziness, nausea, vomiting, diarrhea.  chronic wound noted on right foot second toe. 81yo female presents to ED alert and oriented X3.  patient c/o right foot pain radiating to left knee since last night, right foot cool to touch, positive b/l peal pulses noted. patient denies trauma. patient has had vascular surgery on right lower extremity approx. 5 years ago.  patient denies chest pain, fever, chills, shortness of breath, dizziness, nausea, vomiting, diarrhea.  chronic wound noted on right foot second toe.  IV #18 placed in RAC.

## 2017-06-14 NOTE — ED PROVIDER NOTE - CHPI ED SYMPTOMS NEG
no shortness of breath/no cough/no rash/no fever/no abdominal pain/no decreased eating/drinking/no diarrhea/no headache/no chills/no vomiting

## 2017-06-14 NOTE — H&P ADULT - ATTENDING COMMENTS
82F PMHx Afib on Coumadin, HLD, CVA, MVP, OA, HTN, PVD (s/p RLE bypassx3, most recently 2012), Neuropathy, GERD presents with right leg pain for past for 2 days a/w arterial occlusion. Plan: apprec vascular, PT eval, fall precautions, bridge to therapeutic inr, apprec hemat recs, monitor clinical course.

## 2017-06-14 NOTE — H&P ADULT - NSHPSOCIALHISTORY_GEN_ALL_CORE
Marriage status:  Living condition:  Physical condition: no walker, no O2 needed  no smoking, social drinker, no recreational drug using  Immunization: flushot,                             Pneumonia shot,                              TD Living condition: lives with aid at home  Physical condition:  walker needed, no O2 needed   smoked for 50 years for 1  pack a day and quit 20 years ago, social drinker,  Immunization: flushot, 2016                             Pneumonia shot, up to date                              TD unknown

## 2017-06-14 NOTE — H&P ADULT - PROBLEM SELECTOR PLAN 1
- US Right leg: there appeared to be 2 bypass grafts, both lacking flow. A patent right popliteal artery is not visualized. No patent named below knee vessels are identified.   - a/c - US Right leg: there appeared to be 2 bypass grafts, both lacking flow. A patent right popliteal artery is not visualized. No patent named below knee vessels are identified.   - give coumadin 3 tonight as per pharmacy, f/u INR in am  - Call cardio - Dr Laughlin  - heme/onc - Dr. Calderón  - consult -  Dr. Sánchez, vascular  - admit to [facility]  - PT eval  - OOB wit assist   - bridge coumadin with lovenox,. f/u INR

## 2017-06-14 NOTE — H&P ADULT - HISTORY OF PRESENT ILLNESS
82F PMHx Afib on Coumadin, HLD, CVA, MVP, OA, HTN, PVD(s/p RLE bypassx3, most recently 2012), Neuropathy, GERD presents with 82F PMHx Afib on Coumadin, HLD, CVA, MVP, OA, HTN, PVD (s/p RLE bypassx3, most recently 2012), Neuropathy, GERD presents with right leg pain for past for 2 days. Pt with hx prev vasc surgery 5 years ago in right leg due to poor perfusion. Pt with some gen weakness today. Pt denies leg swelling, cp, SOB,  fever or chills.     In ED .US right LE Possible iliac artery disease. Bypass graft occlusions. No identifiable runoff vessels, right leg, as described. CXR - No acute pulmonary process demonstrated.. 82F PMHx Afib on Coumadin, HLD, CVA, MVP, OA, HTN, PVD (s/p RLE bypassx3, most recently 2012), Neuropathy, GERD presents with right leg pain for past for 2 days. Pt with hx prev vasc surgery 5 years ago in right leg due to poor perfusion. Pt with some gen weakness today. Pt denies leg swelling, cp, SOB,  fever or chills.     In ED WBC 10.4 and INR 1.85. US Right leg: The right common femoral artery is patent although its velocity is lower than expected at that level. Below the right groin, there appeared to be 2 bypass grafts, both lacking flow. A patent right popliteal artery is not visualized. No patent named below knee vessels are identified. Right LE Possible iliac artery disease. Bypass graft occlusions. No identifiable runoff vessels, right leg, as described. CXR - No acute pulmonary process demonstrated. 82F PMHx Afib on Coumadin, HLD, CVA, MVP, OA, HTN, PVD (s/p RLE bypassx3, most recently 2012), Neuropathy, GERD presents with right leg pain for past for 2 days. Pain is 8/10 and pain is worse with ambulation. Pt with hx prev vasc surgery 5 years ago in right leg where three grafts were placed as per patient due to poor perfusion. Pt with some generalized weakness today. Pt also notes feeling dizzy for about 2 days which is unusual. Pt denies leg swelling, cp, SOB,  fever or chills.     In ED WBC 10.4 and INR 1.85. US Right leg: The right common femoral artery is patent although its velocity is lower than expected at that level. Below the right groin, there appeared to be 2 bypass grafts, both lacking flow. A patent right popliteal artery is not visualized. No patent named below knee vessels are identified. Right LE Possible iliac artery disease. Bypass graft occlusions. No identifiable runoff vessels, right leg, as described. CXR - No acute pulmonary process demonstrated. EKG NS 65 bpm - unofficial read. Discussed case with Dr Araya, previous vascular surgeon, stated there is no need for urgent surg for this pt based on prior hx and prev exams, refusing transfer at this time. Discussed case with Dr Sánchez, will see pt, no heparin drip at this time.

## 2017-06-14 NOTE — H&P ADULT - NSHPLABSRESULTS_GEN_ALL_CORE
10.4   9.0   )-----------( 180      ( 14 Jun 2017 11:47 )             32.3     06-14    137  |  106  |  36<H>  ----------------------------<  85  4.3   |  24  |  1.20    Ca    8.8      14 Jun 2017 11:47    TPro  6.6  /  Alb  3.4  /  TBili  1.1  /  DBili  x   /  AST  17  /  ALT  15  /  AlkPhos  73  06-14    PT/INR - ( 14 Jun 2017 11:47 )   PT: 20.4 sec;   INR: 1.85 ratio         PTT - ( 14 Jun 2017 11:47 )  PTT:45.3 sec    Radiology - US right LE Possible iliac artery disease. Bypass graft occlusions. No identifiable runoff vessels, right leg, as described. CXR - No acute pulmonary process demonstrated..

## 2017-06-14 NOTE — ED PROVIDER NOTE - OBJECTIVE STATEMENT
83 yo F p/w R leg pain x past ~ 2 days. Pt with hx prev vasc surg in r leg due to poor perfusion ~ 5 yrs ago. no cp/sob/palp. no leg swelling. Pt with some gen weakness today. no fever/chills. no abd pain. no n/v/d. No neck / back pain. no agg/allev factors. No recent trauma. No other inj or co.

## 2017-06-14 NOTE — ED PROVIDER NOTE - CARE PLAN
Principal Discharge DX:	Weakness  Secondary Diagnosis:	Pain of right lower extremity  Secondary Diagnosis:	Unable to ambulate

## 2017-06-14 NOTE — CONSULT NOTE ADULT - ASSESSMENT
82 year-old woman with a history of hypercholesterolemia, hypertension, rheumatoid arthritis, cerebral aneurysm status post clipping, PAD status post femoropopliteal bypass with subsequent right leg femoropopliteal bypass thrombosis and infected wound, redo bypass with failure, then stents with failure.  She has a history of paroxysmal atrial fibrillation previously on AC with Eliquis which was converted to Coumadin after an episode of GI bleeding. She has a normal EF.  She presents with rest pain in both legs.    -no known CAD, though she almost certainly has cad given her advanced pad  -history of paf, in sr  -continue lovenox until inr is therapeutic  -no vol ol  -history does not suggest acute atherothrombosis or embolism, but subacute progression in vascular disease sxs  -vasc surgery followup  -she has been told in the past she may require amputation if sxs progress  -cont asa  -cont arb  -cont bb  -cont statin  -will follow

## 2017-06-15 LAB
ANION GAP SERPL CALC-SCNC: 5 MMOL/L — SIGNIFICANT CHANGE UP (ref 5–17)
APTT BLD: 48.5 SEC — HIGH (ref 27.5–37.4)
BUN SERPL-MCNC: 26 MG/DL — HIGH (ref 7–23)
CALCIUM SERPL-MCNC: 8.4 MG/DL — LOW (ref 8.5–10.1)
CHLORIDE SERPL-SCNC: 107 MMOL/L — SIGNIFICANT CHANGE UP (ref 96–108)
CO2 SERPL-SCNC: 30 MMOL/L — SIGNIFICANT CHANGE UP (ref 22–31)
CREAT SERPL-MCNC: 1.1 MG/DL — SIGNIFICANT CHANGE UP (ref 0.5–1.3)
GLUCOSE SERPL-MCNC: 80 MG/DL — SIGNIFICANT CHANGE UP (ref 70–99)
HCT VFR BLD CALC: 33.4 % — LOW (ref 34.5–45)
HGB BLD-MCNC: 10.6 G/DL — LOW (ref 11.5–15.5)
INR BLD: 2.11 RATIO — HIGH (ref 0.88–1.16)
MCHC RBC-ENTMCNC: 29.5 PG — SIGNIFICANT CHANGE UP (ref 27–34)
MCHC RBC-ENTMCNC: 31.7 GM/DL — LOW (ref 32–36)
MCV RBC AUTO: 93.1 FL — SIGNIFICANT CHANGE UP (ref 80–100)
PLATELET # BLD AUTO: 162 K/UL — SIGNIFICANT CHANGE UP (ref 150–400)
POTASSIUM SERPL-MCNC: 4 MMOL/L — SIGNIFICANT CHANGE UP (ref 3.5–5.3)
POTASSIUM SERPL-SCNC: 4 MMOL/L — SIGNIFICANT CHANGE UP (ref 3.5–5.3)
PROTHROM AB SERPL-ACNC: 23.4 SEC — HIGH (ref 9.8–12.7)
RBC # BLD: 3.59 M/UL — LOW (ref 3.8–5.2)
RBC # FLD: 12.7 % — SIGNIFICANT CHANGE UP (ref 10.3–14.5)
SODIUM SERPL-SCNC: 142 MMOL/L — SIGNIFICANT CHANGE UP (ref 135–145)
WBC # BLD: 5.7 K/UL — SIGNIFICANT CHANGE UP (ref 3.8–10.5)
WBC # FLD AUTO: 5.7 K/UL — SIGNIFICANT CHANGE UP (ref 3.8–10.5)

## 2017-06-15 PROCEDURE — 99232 SBSQ HOSP IP/OBS MODERATE 35: CPT

## 2017-06-15 PROCEDURE — 99233 SBSQ HOSP IP/OBS HIGH 50: CPT | Mod: GC

## 2017-06-15 RX ORDER — WARFARIN SODIUM 2.5 MG/1
3 TABLET ORAL ONCE
Qty: 0 | Refills: 0 | Status: COMPLETED | OUTPATIENT
Start: 2017-06-15 | End: 2017-06-15

## 2017-06-15 RX ORDER — TRAMADOL HYDROCHLORIDE 50 MG/1
1 TABLET ORAL
Qty: 90 | Refills: 0 | OUTPATIENT
Start: 2017-06-15 | End: 2017-07-15

## 2017-06-15 RX ORDER — OXYBUTYNIN CHLORIDE 5 MG
1 TABLET ORAL
Qty: 0 | Refills: 0 | COMMUNITY
Start: 2017-06-15

## 2017-06-15 RX ORDER — TRAMADOL HYDROCHLORIDE 50 MG/1
0 TABLET ORAL
Qty: 0 | Refills: 0 | COMMUNITY

## 2017-06-15 RX ORDER — TRAMADOL HYDROCHLORIDE 50 MG/1
50 TABLET ORAL THREE TIMES A DAY
Qty: 0 | Refills: 0 | Status: DISCONTINUED | OUTPATIENT
Start: 2017-06-15 | End: 2017-06-16

## 2017-06-15 RX ADMIN — TRAMADOL HYDROCHLORIDE 50 MILLIGRAM(S): 50 TABLET ORAL at 06:45

## 2017-06-15 RX ADMIN — TRAMADOL HYDROCHLORIDE 50 MILLIGRAM(S): 50 TABLET ORAL at 21:42

## 2017-06-15 RX ADMIN — Medication 81 MILLIGRAM(S): at 11:43

## 2017-06-15 RX ADMIN — TRAMADOL HYDROCHLORIDE 50 MILLIGRAM(S): 50 TABLET ORAL at 22:17

## 2017-06-15 RX ADMIN — AMLODIPINE BESYLATE 5 MILLIGRAM(S): 2.5 TABLET ORAL at 21:42

## 2017-06-15 RX ADMIN — LOSARTAN POTASSIUM 50 MILLIGRAM(S): 100 TABLET, FILM COATED ORAL at 05:19

## 2017-06-15 RX ADMIN — Medication 5 MILLIGRAM(S): at 05:19

## 2017-06-15 RX ADMIN — WARFARIN SODIUM 3 MILLIGRAM(S): 2.5 TABLET ORAL at 21:42

## 2017-06-15 RX ADMIN — TRAMADOL HYDROCHLORIDE 50 MILLIGRAM(S): 50 TABLET ORAL at 05:19

## 2017-06-15 RX ADMIN — ENOXAPARIN SODIUM 50 MILLIGRAM(S): 100 INJECTION SUBCUTANEOUS at 14:02

## 2017-06-15 RX ADMIN — PANTOPRAZOLE SODIUM 40 MILLIGRAM(S): 20 TABLET, DELAYED RELEASE ORAL at 05:20

## 2017-06-15 RX ADMIN — ATORVASTATIN CALCIUM 40 MILLIGRAM(S): 80 TABLET, FILM COATED ORAL at 21:42

## 2017-06-15 RX ADMIN — GABAPENTIN 100 MILLIGRAM(S): 400 CAPSULE ORAL at 21:42

## 2017-06-15 RX ADMIN — TRAMADOL HYDROCHLORIDE 50 MILLIGRAM(S): 50 TABLET ORAL at 13:00

## 2017-06-15 RX ADMIN — GABAPENTIN 100 MILLIGRAM(S): 400 CAPSULE ORAL at 05:19

## 2017-06-15 RX ADMIN — GABAPENTIN 100 MILLIGRAM(S): 400 CAPSULE ORAL at 13:00

## 2017-06-15 RX ADMIN — Medication 5 MILLIGRAM(S): at 17:18

## 2017-06-15 RX ADMIN — Medication 100 MILLIGRAM(S): at 17:18

## 2017-06-15 RX ADMIN — Medication 1 TABLET(S): at 11:43

## 2017-06-15 RX ADMIN — Medication 100 MILLIGRAM(S): at 05:19

## 2017-06-15 NOTE — PROGRESS NOTE ADULT - ASSESSMENT
82F PMHx Afib on Coumadin, HLD, CVA, MVP, OA, HTN, PVD (s/p RLE bypassx3, most recently 2012), Neuropathy, GERD presents with right leg pain for past for 2 days a/w arterial occlusion 82F PMHx Afib on Coumadin, HLD, CVA, MVP, OA, HTN, PVD (s/p RLE bypassx3, most recently 2012), Neuropathy, GERD presents with right leg pain  claudication for past for 2 days a/w arterial occlusion

## 2017-06-15 NOTE — DISCHARGE NOTE ADULT - HOSPITAL COURSE
82F PMHx Afib on Coumadin, HLD, CVA, MVP, OA, HTN, PVD (s/p RLE bypassx3, most recently ), Neuropathy, GERD presented with right leg pain for past for 2 days. Pain is 8/10 and pain is worse with ambulation. Pt with hx prev vasc surgery 5 years ago in right leg where three grafts were placed as per patient due to poor perfusion. Pt with some generalized weakness on day of admit. Pt also notes feeling dizzy for about 2 days which is unusual. Pt denied leg swelling, cp, SOB,  fever or chills. In ED WBC 10.4 and INR 1.85. US Right leg: The right common femoral artery is patent although its velocity is lower than expected at that level. Below the right groin, there appeared to be 2 bypass grafts, both lacking flow. A patent right popliteal artery is not visualized. No patent named below knee vessels are identified. Right LE Possible iliac artery disease. Bypass graft occlusions. No identifiable runoff vessels, right leg, as described. CXR - No acute pulmonary process demonstrated. EKG NS 65 bpm - unofficial read. Discussed case with Dr Araya, previous vascular surgeon, stated there is no need for urgent surg for this pt based on prior hx and prev exams, refusing transfer at this time. Discussed case with Dr Sánchez, will see pt, no heparin drip at this time.    Pt was seen by cardiology - no known CAD, though she almost certainly has cad given her advanced PAD. Warfarin goal to an INR of 2.0-3.0. History does not suggest acute atherothrombosis or embolism, but subacute progression in vascular disease sxs. Pt was seen by vascular surgery - This patient has severe end stage PAD of the right leg with multiple failed bypasses . Further vascular interventions are not a option . Patient is at risk for a major amputation of her right leg . She was given informed consent.     Pt was seen and evaluated on day of discharge 6/15/17    REVIEW OF SYSTEMS:    CONSTITUTIONAL: No weakness, fevers or chills  EYES/ENT: No visual changes, no throat pain   RESPIRATORY: No cough, wheezing, hemoptysis; No shortness of breath  CARDIOVASCULAR: No chest pain or palpitations  GASTROINTESTINAL: No abdominal, nausea, vomiting, or hematemesis; No diarrhea or constipation. No melena or hematochezia.  GENITOURINARY: No dysuria, frequency or hematuria  NEUROLOGICAL: No dizziness, numbness, or weakness  SKIN: No itching, burning, rashes, or lesions   MSK - right leg pain no walking and to touch  All other review of systems is negative unless indicated above.    Vital Signs Last 24 Hrs  T(C): 36.8, Max: 36.8 (06-15 @ 04:10)  T(F): 98.3, Max: 98.3 (06-15 @ 04:10)  HR: 65 (65 - 65)  BP: 133/76 (133/76 - 133/76)  BP(mean): --  RR: 17 (17 - 17)  SpO2: 95% (95% - 95%)  Wt(kg): --    PHYSICAL EXAM:     GENERAL: no acute distress  HEENT: NC/AT, EOMI, neck supple, MMM  RESPIRATORY: LCTAB/L, no rhonchi, rales, or wheezing  CARDIOVASCULAR: RRR, no murmurs, gallops, rubs  ABDOMINAL: soft, non-tender, non-distended, positive bowel sounds   EXTREMITIES: no clubbing, cyanosis, or edema  NEUROLOGICAL: alert and oriented x 3, non-focal  SKIN: no rashes or lesions   MUSCULOSKELETAL: right leg pain to palpation, right leg colder than left    LABS:                        10.6   5.7   )-----------( 162      ( 15 Osmani 2017 08:01 )             33.4     -15    142  |  107  |  26<H>  ----------------------------<  80  4.0   |  30  |  1.10    Ca    8.4<L>      15 Osmani 2017 08:01    TPro  6.6  /  Alb  3.4  /  TBili  1.1  /  DBili  x   /  AST  17  /  ALT  15  /  AlkPhos  73  06-14    PT/INR - ( 15 Osmani 2017 08:01 )   PT: 23.4 sec;   INR: 2.11 ratio         PTT - ( 15 Osmani 2017 08:01 )  PTT:48.5 sec  Urinalysis Basic - ( 2017 14:46 )    Color: Pale Yellow / Appearance: Clear / S.005 / pH: x  Gluc: x / Ketone: Negative  / Bili: Negative / Urobili: Negative   Blood: x / Protein: Negative / Nitrite: Positive   Leuk Esterase: Small / RBC: x / WBC 11-25   Sq Epi: x / Non Sq Epi: Occasional / Bacteria: Moderate      CAPILLARY BLOOD GLUCOSE      MEDICATIONS  (STANDING):  aspirin enteric coated 81milliGRAM(s) Oral daily  losartan 50milliGRAM(s) Oral daily  gabapentin 100milliGRAM(s) Oral three times a day  atorvastatin 40milliGRAM(s) Oral at bedtime  pantoprazole    Tablet 40milliGRAM(s) Oral before breakfast  amLODIPine   Tablet 5milliGRAM(s) Oral at bedtime  metoprolol 100milliGRAM(s) Oral two times a day  multivitamin 1Tablet(s) Oral daily  enoxaparin Injectable 50milliGRAM(s) SubCutaneous every 24 hours  oxybutynin 5milliGRAM(s) Oral two times a day  traMADol 50milliGRAM(s) Oral three times a day    More than 30 min was spent on this note. 82F PMHx Afib on Coumadin, HLD, CVA, MVP, OA, HTN, PVD (s/p RLE bypassx3, most recently ), Neuropathy, GERD presented with right leg pain for past for 2 days. Pain is 8/10 and pain is worse with ambulation. Pt with hx prev vasc surgery 5 years ago in right leg where three grafts were placed as per patient due to poor perfusion. Pt with some generalized weakness on day of admit. Pt also notes feeling dizzy for about 2 days which is unusual. Pt denied leg swelling, cp, SOB,  fever or chills. In ED WBC 10.4 and INR 1.85. US Right leg: The right common femoral artery is patent although its velocity is lower than expected at that level. Below the right groin, there appeared to be 2 bypass grafts, both lacking flow. A patent right popliteal artery is not visualized. No patent named below knee vessels are identified. Right LE Possible iliac artery disease. Bypass graft occlusions. No identifiable runoff vessels, right leg, as described. CXR - No acute pulmonary process demonstrated. EKG NS 65 bpm - unofficial read. Discussed case with Dr Araya, previous vascular surgeon, stated there is no need for urgent surg for this pt based on prior hx and prev exams, refusing transfer at this time. Discussed case with Dr Sánchez, will see pt, no heparin drip at this time.    Pt was seen by cardiology - no known CAD, though she almost certainly has cad given her advanced PAD. Warfarin goal to an INR of 2.0-3.0. History does not suggest acute atherothrombosis or embolism, but subacute progression in vascular disease sxs. Pt was seen by vascular surgery - This patient has severe end stage PAD of the right leg with multiple failed bypasses . Further vascular interventions are not a option . Patient is at risk for a major amputation of her right leg. She was given informed consent.     Pt was seen and evaluated on day of discharge 17    REVIEW OF SYSTEMS:    CONSTITUTIONAL: No weakness, fevers or chills  EYES/ENT: No visual changes, no throat pain   RESPIRATORY: No cough, wheezing, hemoptysis; No shortness of breath  CARDIOVASCULAR: No chest pain or palpitations  GASTROINTESTINAL: No abdominal, nausea, vomiting, or hematemesis; No diarrhea or constipation. No melena or hematochezia.  GENITOURINARY: No dysuria, frequency or hematuria  NEUROLOGICAL: No dizziness, numbness, or weakness  SKIN: No itching, burning, rashes, or lesions   MSK - right leg pain no walking and to touch  All other review of systems is negative unless indicated above.    Vital Signs Last 24 Hrs  T(C): 36.8, Max: 36.8 (06-15 @ 04:10)  T(F): 98.3, Max: 98.3 (06-15 @ 04:10)  HR: 65 (65 - 65)  BP: 133/76 (133/76 - 133/76)  BP(mean): --  RR: 17 (17 - 17)  SpO2: 95% (95% - 95%)  Wt(kg): --    PHYSICAL EXAM:     GENERAL: no acute distress  HEENT: NC/AT, EOMI, neck supple, MMM  RESPIRATORY: LCTAB/L, no rhonchi, rales, or wheezing  CARDIOVASCULAR: RRR, no murmurs, gallops, rubs  ABDOMINAL: soft, non-tender, non-distended, positive bowel sounds   EXTREMITIES: no clubbing, cyanosis, or edema  NEUROLOGICAL: alert and oriented x 3, non-focal  SKIN: no rashes or lesions   MUSCULOSKELETAL: right leg pain to palpation, right leg colder than left    LABS:                        10.6   5.7   )-----------( 162      ( 15 Osmani 2017 08:01 )             33.4     -15    142  |  107  |  26<H>  ----------------------------<  80  4.0   |  30  |  1.10    Ca    8.4<L>      15 Osmani 2017 08:01    TPro  6.6  /  Alb  3.4  /  TBili  1.1  /  DBili  x   /  AST  17  /  ALT  15  /  AlkPhos  73  06-14    PT/INR - ( 15 Osmani 2017 08:01 )   PT: 23.4 sec;   INR: 2.11 ratio         PTT - ( 15 Osmani 2017 08:01 )  PTT:48.5 sec  Urinalysis Basic - ( 2017 14:46 )    Color: Pale Yellow / Appearance: Clear / S.005 / pH: x  Gluc: x / Ketone: Negative  / Bili: Negative / Urobili: Negative   Blood: x / Protein: Negative / Nitrite: Positive   Leuk Esterase: Small / RBC: x / WBC 11-25   Sq Epi: x / Non Sq Epi: Occasional / Bacteria: Moderate      CAPILLARY BLOOD GLUCOSE      MEDICATIONS  (STANDING):  aspirin enteric coated 81milliGRAM(s) Oral daily  losartan 50milliGRAM(s) Oral daily  gabapentin 100milliGRAM(s) Oral three times a day  atorvastatin 40milliGRAM(s) Oral at bedtime  pantoprazole    Tablet 40milliGRAM(s) Oral before breakfast  amLODIPine   Tablet 5milliGRAM(s) Oral at bedtime  metoprolol 100milliGRAM(s) Oral two times a day  multivitamin 1Tablet(s) Oral daily  enoxaparin Injectable 50milliGRAM(s) SubCutaneous every 24 hours  oxybutynin 5milliGRAM(s) Oral two times a day  traMADol 50milliGRAM(s) Oral three times a day    More than 30 min was spent on this note. 82F PMHx Afib on Coumadin, HLD, CVA, MVP, OA, HTN, PVD (s/p RLE bypassx3, most recently ), Neuropathy, GERD presented with right leg pain for past for 2 days. Pain is 8/10 and pain is worse with ambulation. Pt with hx prev vasc surgery 5 years ago in right leg where three grafts were placed as per patient due to poor perfusion. Pt with some generalized weakness on day of admit. Pt also notes feeling dizzy for about 2 days which is unusual. Pt denied leg swelling, cp, SOB,  fever or chills. In ED WBC 10.4 and INR 1.85. US Right leg: The right common femoral artery is patent although its velocity is lower than expected at that level. Below the right groin, there appeared to be 2 bypass grafts, both lacking flow. A patent right popliteal artery is not visualized. No patent named below knee vessels are identified. Right LE Possible iliac artery disease. Bypass graft occlusions. No identifiable runoff vessels, right leg, as described. CXR - No acute pulmonary process demonstrated. EKG NS 65 bpm - unofficial read. Discussed case with Dr Araya, previous vascular surgeon, stated there is no need for urgent surg for this pt based on prior hx and prev exams, refusing transfer at this time. Discussed case with Dr Sánchez, will see pt, no heparin drip at this time.    Pt was seen by cardiology - no known CAD, though she almost certainly has cad given her advanced PAD. Warfarin goal to an INR of 2.0-3.0. History does not suggest acute atherothrombosis or embolism, but subacute progression in vascular disease sxs. Pt was seen by vascular surgery - This patient has severe end stage PAD of the right leg with multiple failed bypasses . Further vascular interventions are not a option . Patient is at risk for a major amputation of her right leg. She was given informed consent. Pt was seen by heme/onc - agrees with Dr. Sánchez, no intervention indicated at this time.    Pt was seen and evaluated on day of discharge 17    REVIEW OF SYSTEMS:    CONSTITUTIONAL: No weakness, fevers or chills  EYES/ENT: No visual changes, no throat pain   RESPIRATORY: No cough, wheezing, hemoptysis; No shortness of breath  CARDIOVASCULAR: No chest pain or palpitations  GASTROINTESTINAL: No abdominal, nausea, vomiting, or hematemesis; No diarrhea or constipation. No melena or hematochezia.  GENITOURINARY: No dysuria, frequency or hematuria  NEUROLOGICAL: No dizziness, numbness, or weakness  SKIN: No itching, burning, rashes, or lesions   MSK - right leg pain no walking and to touch  All other review of systems is negative unless indicated above.    Vital Signs Last 24 Hrs  T(C): 36.8, Max: 36.8 ( @ 04:48)  T(F): 98.3, Max: 98.3 (16 @ 04:48)  HR: 65 (62 - 66)  BP: 128/75 (128/75 - 145/83)  BP(mean): --  RR: 16 (15 - 16)  SpO2: 95% (95% - 96%)    PHYSICAL EXAM:     GENERAL: no acute distress  HEENT: NC/AT, EOMI, neck supple, MMM  RESPIRATORY: LCTAB/L, no rhonchi, rales, or wheezing  CARDIOVASCULAR: RRR, no murmurs, gallops, rubs  ABDOMINAL: soft, non-tender, non-distended, positive bowel sounds   EXTREMITIES: no clubbing, cyanosis, or edema  NEUROLOGICAL: alert and oriented x 3, non-focal  SKIN: no rashes or lesions   MUSCULOSKELETAL: right leg pain to palpation, right leg colder than left    LABS: ***********************************************************************                         Color: Pale Yellow / Appearance: Clear / S.005 / pH: x  Gluc: x / Ketone: Negative  / Bili: Negative / Urobili: Negative   Blood: x / Protein: Negative / Nitrite: Positive   Leuk Esterase: Small / RBC: x / WBC 11-25   Sq Epi: x / Non Sq Epi: Occasional / Bacteria: Moderate      CAPILLARY BLOOD GLUCOSE      MEDICATIONS  (STANDING):  aspirin enteric coated 81milliGRAM(s) Oral daily  losartan 50milliGRAM(s) Oral daily  gabapentin 100milliGRAM(s) Oral three times a day  atorvastatin 40milliGRAM(s) Oral at bedtime  pantoprazole    Tablet 40milliGRAM(s) Oral before breakfast  amLODIPine   Tablet 5milliGRAM(s) Oral at bedtime  metoprolol 100milliGRAM(s) Oral two times a day  multivitamin 1Tablet(s) Oral daily  enoxaparin Injectable 50milliGRAM(s) SubCutaneous every 24 hours  oxybutynin 5milliGRAM(s) Oral two times a day  traMADol 50milliGRAM(s) Oral three times a day    More than 30 min was spent on this note. 82F PMHx Afib on Coumadin, HLD, CVA, MVP, OA, HTN, PVD (s/p RLE bypassx3, most recently ), Neuropathy, GERD presented with right leg pain for past for 2 days. Pain is 8/10 and pain is worse with ambulation. Pt with hx prev vasc surgery 5 years ago in right leg where three grafts were placed as per patient due to poor perfusion. Pt with some generalized weakness on day of admit. Pt also notes feeling dizzy for about 2 days which is unusual. Pt denied leg swelling, cp, SOB,  fever or chills. In ED WBC 10.4 and INR 1.85. US Right leg: The right common femoral artery is patent although its velocity is lower than expected at that level. Below the right groin, there appeared to be 2 bypass grafts, both lacking flow. A patent right popliteal artery is not visualized. No patent named below knee vessels are identified. Right LE Possible iliac artery disease. Bypass graft occlusions. No identifiable runoff vessels, right leg, as described. CXR - No acute pulmonary process demonstrated. EKG NS 65 bpm - unofficial read. Discussed case with Dr Araya, previous vascular surgeon, stated there is no need for urgent surg for this pt based on prior hx and prev exams, refusing transfer at this time. Discussed case with Dr Sánchez, will see pt, no heparin drip at this time.    Pt was seen by cardiology - no known CAD, though she almost certainly has cad given her advanced PAD. Warfarin goal to an INR of 2.0-3.0. History does not suggest acute atherothrombosis or embolism, but subacute progression in vascular disease sxs. Pt was seen by vascular surgery - This patient has severe end stage PAD of the right leg with multiple failed bypasses . Further vascular interventions are not a option . Patient is at risk for a major amputation of her right leg. She was given informed consent. Pt was seen by heme/onc - agrees with Dr. Sánchez, no intervention indicated at this time. Due to pts leg pain Tramadol was increased to 50 TID, pt to continue this at home. Pts INR was subtherapeutic on admit, coumadin increased to 3 during hospital course and INR was therapeutic Pt to continue 3mg QD. Pt received left leg arterial US.    Pt was seen and evaluated on day of discharge 17    REVIEW OF SYSTEMS:    CONSTITUTIONAL: No weakness, fevers or chills  EYES/ENT: No visual changes, no throat pain   RESPIRATORY: No cough, wheezing, hemoptysis; No shortness of breath  CARDIOVASCULAR: No chest pain or palpitations  GASTROINTESTINAL: No abdominal, nausea, vomiting, or hematemesis; No diarrhea or constipation. No melena or hematochezia.  GENITOURINARY: No dysuria, frequency or hematuria  NEUROLOGICAL: No dizziness, numbness, or weakness  SKIN: No itching, burning, rashes, or lesions   MSK - right leg pain no walking and to touch  All other review of systems is negative unless indicated above.    Vital Signs Last 24 Hrs  T(C): 36.8, Max: 36.8 ( @ 04:48)  T(F): 98.3, Max: 98.3 ( @ 04:48)  HR: 65 (62 - 66)  BP: 128/75 (128/75 - 145/83)  BP(mean): --  RR: 16 (15 - 16)  SpO2: 95% (95% - 96%)    PHYSICAL EXAM:     GENERAL: no acute distress  HEENT: NC/AT, EOMI, neck supple, MMM  RESPIRATORY: LCTAB/L, no rhonchi, rales, or wheezing  CARDIOVASCULAR: RRR, no murmurs, gallops, rubs  ABDOMINAL: soft, non-tender, non-distended, positive bowel sounds   EXTREMITIES: no clubbing, cyanosis, or edema  NEUROLOGICAL: alert and oriented x 3, non-focal  SKIN: no rashes or lesions   MUSCULOSKELETAL: right leg pain to palpation, right leg colder than left                          10.4   6.1   )-----------( 158      ( 2017 07:44 )             32.4     06-    143  |  109<H>  |  30<H>  ----------------------------<  90  4.1   |  29  |  1.30    Ca    8.4<L>      2017 07:44    TPro  6.6  /  Alb  3.4  /  TBili  1.1  /  DBili  x   /  AST  17  /  ALT  15  /  AlkPhos  73  06-    PT/INR - ( 2017 07:44 )   PT: 23.7 sec;   INR: 2.14 ratio         PTT - ( 2017 07:44 )  PTT:48.5 sec  Urinalysis Basic - ( 2017 14:46 )    Color: Pale Yellow / Appearance: Clear / S.005 / pH: x  Gluc: x / Ketone: Negative  / Bili: Negative / Urobili: Negative   Blood: x / Protein: Negative / Nitrite: Positive   Leuk Esterase: Small / RBC: x / WBC 11-25   Sq Epi: x / Non Sq Epi: Occasional / Bacteria: Moderate    MEDICATIONS  (STANDING):  aspirin enteric coated 81milliGRAM(s) Oral daily  losartan 50milliGRAM(s) Oral daily  gabapentin 100milliGRAM(s) Oral three times a day  atorvastatin 40milliGRAM(s) Oral at bedtime  pantoprazole    Tablet 40milliGRAM(s) Oral before breakfast  amLODIPine   Tablet 5milliGRAM(s) Oral at bedtime  metoprolol 100milliGRAM(s) Oral two times a day  multivitamin 1Tablet(s) Oral daily  oxybutynin 5milliGRAM(s) Oral two times a day  traMADol 50milliGRAM(s) Oral three times a day      More than 30 min was spent on this note. 82F PMHx Afib on Coumadin, HLD, CVA, MVP, OA, HTN, PVD (s/p RLE bypassx3, most recently 2012), Neuropathy, GERD presented with right leg pain for past for 2 days. Pain is 8/10 and pain is worse with ambulation. Pt with hx prev vasc surgery 5 years ago in right leg where three grafts were placed as per patient due to poor perfusion. Pt with some generalized weakness on day of admit. Pt also notes feeling dizzy for about 2 days which is unusual. Pt denied leg swelling, cp, SOB,  fever or chills. In ED WBC 10.4 and INR 1.85. US Right leg: The right common femoral artery is patent although its velocity is lower than expected at that level. Below the right groin, there appeared to be 2 bypass grafts, both lacking flow. A patent right popliteal artery is not visualized. No patent named below knee vessels are identified. Right LE Possible iliac artery disease. Bypass graft occlusions. No identifiable runoff vessels, right leg, as described. CXR - No acute pulmonary process demonstrated. EKG NS 65 bpm - unofficial read. Discussed case with Dr Araya, previous vascular surgeon, stated there is no need for urgent surg for this pt based on prior hx and prev exams, refusing transfer at this time. Discussed case with Dr Sánchez, will see pt, no heparin drip at this time. so pt admitted reg floor with arterial lt leg claudication pain .     Pt was seen by cardiology - no known CAD, though she almost certainly has cad given her advanced PAD. Warfarin goal to an INR of 2.0-3.0. History does not suggest acute atherothrombosis or embolism, but subacute progression in vascular disease . Pt was seen by vascular surgery dr kate combs - This patient has severe end stage PAD of the right leg with multiple failed bypasses . Further vascular interventions are not a option . Patient is at risk for a major amputation of her right leg. She was refusing any big surgery this time.  Pt was seen by heme/onc - agrees with Dr. Sánchez, no intervention indicated at this time. Due to pts leg pain Tramadol was increased to 50 TID, pt to continue this at home. Pts INR was subtherapeutic on admit, coumadin increased to 3 during hospital course and INR was therapeutic Pt to continue 3mg QD. Pt received left leg arterial US fu out pt with vascular surgery . pt have asymptomatic bacteruria no symptom no need abx tt. blood cult neg .   pt seen by physical therapy no need rehab .     Pt was seen and evaluated on day of discharge 6/16/17    REVIEW OF SYSTEMS:    CONSTITUTIONAL: No weakness, fevers or chills  EYES/ENT: No visual changes, no throat pain   RESPIRATORY: No cough, wheezing, hemoptysis; No shortness of breath  CARDIOVASCULAR: No chest pain or palpitations  GASTROINTESTINAL: No abdominal, nausea, vomiting, or hematemesis; No diarrhea or constipation. No melena or hematochezia.  GENITOURINARY: No dysuria, frequency or hematuria  NEUROLOGICAL: No dizziness, numbness, or weakness  SKIN: No itching, burning, rashes, or lesions   MSK - right leg pain no walking and to touch  All other review of systems is negative unless indicated above.    Vital Signs Last 24 Hrs  T(C): 36.8, Max: 36.8 (06-16 @ 04:48)  T(F): 98.3, Max: 98.3 (06-16 @ 04:48)  HR: 65 (62 - 66)  BP: 128/75 (128/75 - 145/83)  BP(mean): --  RR: 16 (15 - 16)  SpO2: 95% (95% - 96%)    PHYSICAL EXAM:     GENERAL: no acute distress  HEENT: NC/AT, EOMI, neck supple, MMM  RESPIRATORY: LCTAB/L, no rhonchi, rales, or wheezing  CARDIOVASCULAR: RRR, no murmurs, gallops, rubs  ABDOMINAL: soft, non-tender, non-distended, positive bowel sounds   EXTREMITIES: no clubbing, cyanosis, or edema  NEUROLOGICAL: alert and oriented x 3, non-focal  SKIN: no rashes or lesions   MUSCULOSKELETAL: right leg pain to palpation, right leg colder than left                More than 30 min was spent on this note.

## 2017-06-15 NOTE — PROGRESS NOTE ADULT - PROBLEM SELECTOR PLAN 3
controlled with metoprolol  continue coumadin, bridge with lovenox controlled with metoprolol  continue coumadin, fu inr

## 2017-06-15 NOTE — PROGRESS NOTE ADULT - PROBLEM SELECTOR PLAN 1
- US Right leg: there appeared to be 2 bypass grafts, both lacking flow. A patent right popliteal artery is not visualized. No patent named below knee vessels are identified.   - give coumadin 3 tonight, f/u INR in am, bridge coumadin with lovenox  - Pt was seen by cardiology - no known CAD, though she almost certainly has cad given her advanced PAD. Warfarin goal to an INR of 2.0-3.0. History does not suggest acute atherothrombosis or embolism, but subacute progression in vascular disease  - Pt was seen by vascular surgery - This patient has severe end stage PAD of the right leg with multiple failed bypasses . Further vascular interventions are not a option. Patient is at risk for a major amputation of her right leg. She was given informed consent.   - f/u heme/onc - Dr. Calderón consult  - PT eval - no skilled PT needs - US Right leg: there appeared to be 2 bypass grafts, both lacking flow. A patent right popliteal artery is not visualized. No patent named below knee vessels are identified.   - give coumadin 3 tonight, f/u INR in am, inr 2    - Pt was seen by cardiology - no known CAD, though she almost certainly has cad given her advanced PAD. Warfarin goal to an INR of 2.0-3.0. History does not suggest acute atherothrombosis or embolism, but subacute progression in vascular disease  - Pt was seen by vascular surgery - This patient has severe end stage PAD of the right leg with multiple failed bypasses . Further vascular interventions are not a option. Patient is at risk for a major amputation of her right leg. She was given informed consent.   - f/u heme/onc - Dr. Calderón consult  - PT eval - no skilled PT needs

## 2017-06-15 NOTE — CONSULT NOTE ADULT - PROBLEM SELECTOR RECOMMENDATION 9
no additional heme evaluation needed. continue current anticoagulation and vascular surgery evaluation.  Has been followed and previously treated with continued progression of arterial occlusion and grafts.

## 2017-06-15 NOTE — DISCHARGE NOTE ADULT - PATIENT PORTAL LINK FT
“You can access the [facility] Patient Portal, offered by [facility], by registering with the following website: [email or web address]”

## 2017-06-15 NOTE — PHYSICAL THERAPY INITIAL EVALUATION ADULT - PERTINENT HX OF CURRENT PROBLEM, REHAB EVAL
pt with [facility] of a-fib, on coumadin, HLD, CVA MVP PVD admitted due to right leg pain x 2 days. Pt diagnosed with arterial occlusion

## 2017-06-15 NOTE — CONSULT NOTE ADULT - ASSESSMENT
This patient has severe end stage PAD of the right leg with multiple failed bypasses . Further vascular interventions are not a option . Patient is at risk for a major amputation of her right leg . She was given informed consent

## 2017-06-15 NOTE — PROGRESS NOTE ADULT - ASSESSMENT
82 year-old woman with a history of hypercholesterolemia, hypertension, rheumatoid arthritis, cerebral aneurysm status post clipping, PAD status post femoropopliteal bypass with subsequent right leg femoropopliteal bypass thrombosis and infected wound, redo bypass with failure, then stents with failure.  She has a history of paroxysmal atrial fibrillation previously on AC with Eliquis which was converted to Coumadin after an episode of GI bleeding. She has a normal EF.  She presents with rest pain in both legs.She has now been seen by Dr. Sánchez of vascular surgery felt not to be a candidate for further mechanical intervention.    -no known CAD, though she almost certainly has cad given her advanced pad  -history of paf, in sr  -warfarin to an INR of 2.0-3.0  -no vol ol  -history does not suggest acute atherothrombosis or embolism, but subacute progression in vascular disease sxs  -vasc surgery followup  -she has been told in the past she may require amputation if sxs progress  -cont asa  -cont arb, amlodipine  -cont bb  -cont statin  -will follow

## 2017-06-15 NOTE — DISCHARGE NOTE ADULT - CARE PLAN
Principal Discharge DX:	Arterial occlusion, lower extremity  Goal:	stable  Instructions for follow-up, activity and diet:	continue coumadin, f/u INR on Monday  f/u with PMD on monday  f/u with vascular surgery  Secondary Diagnosis:	Afib  Secondary Diagnosis:	CVA (cerebral vascular accident)  Secondary Diagnosis:	Hypertension  Secondary Diagnosis:	Hyperlipidemia  Secondary Diagnosis:	Osteoarthritis Principal Discharge DX:	Arterial occlusion, lower extremity  Goal:	chr  Instructions for follow-up, activity and diet:	continue coumadin, f/u INR on Monday  f/u with PMD on monday  f/u with vascular surgery  Secondary Diagnosis:	Afib  Instructions for follow-up, activity and diet:	on coumadin /  fu inr  Secondary Diagnosis:	CVA (cerebral vascular accident)  Instructions for follow-up, activity and diet:	on coumadin/ satin  Secondary Diagnosis:	Hypertension  Instructions for follow-up, activity and diet:	low salt diet / on meds  Secondary Diagnosis:	Hyperlipidemia  Instructions for follow-up, activity and diet:	on meds / low cholest  Secondary Diagnosis:	Osteoarthritis  Instructions for follow-up, activity and diet:	on meds

## 2017-06-15 NOTE — DISCHARGE NOTE ADULT - CARE PROVIDER_API CALL
Clif Sánchez), Surgery  [address] [place] [address]  Phone: [phone]  Fax: [phone] Clif Sánchez (MD), Surgery  [address] [place] [id number]  Phone: [phone]  Fax: [phone]    Tahir Lockhart (), Family Medicine  [address] [place] [address]  Phone: [phone]  Fax: [phone]

## 2017-06-15 NOTE — CONSULT NOTE ADULT - ASSESSMENT
Hypercoagulabilty without evidence of primary hypercoaguable cause. Has hx of significant peripheral vascular and continues treatment with vascular surgery. There is no surgical options at present and will continue current anticoagulation

## 2017-06-15 NOTE — DISCHARGE NOTE ADULT - MEDICATION SUMMARY - MEDICATIONS TO CHANGE
I will SWITCH the dose or number of times a day I take the medications listed below when I get home from the hospital:    warfarin 2.5 mg oral tablet  --  by mouth once a day (at bedtime)

## 2017-06-15 NOTE — DISCHARGE NOTE ADULT - MEDICATION SUMMARY - MEDICATIONS TO TAKE
I will START or STAY ON the medications listed below when I get home from the hospital:    Rolling Walker: Diagnosis: Weakness  -- Indication: For Gait    aspirin 81 mg oral delayed release tablet  -- 1 tab(s) by mouth once a day  -- Indication: For CVA (cerebral vascular accident)    Tylenol 325 mg oral tablet  -- 2 tab(s) by mouth every 4 hours as needed for mild pain. (** Max daily dose of Tylenol should not exceed 3200mg/24 hrs-Note Tylenol in Norco)  -- Indication: For pain    traMADol 50 mg oral tablet  -- 1 tab(s) by mouth 3 times a day  -- Indication: For pain    losartan 50 mg oral tablet  -- 1 tab(s) by mouth once a day (at bedtime)  -- Indication: For Hypertension    warfarin 3 mg oral tablet  -- 1 tab(s) by mouth once a day  -- Do not take this drug if you are pregnant.  It is very important that you take or use this exactly as directed.  Do not skip doses or discontinue unless directed by your doctor.  Obtain medical advice before taking any non-prescription drugs as some may affect the action of this medication.    -- Indication: For CVA (cerebral vascular accident)    gabapentin 100 mg oral capsule  --  by mouth 3 times a day  -- Indication: For pain    atorvastatin 40 mg oral tablet  -- 1 tab(s) by mouth once a day (at bedtime)  -- Indication: For CVA (cerebral vascular accident)    metoprolol tartrate 100 mg oral tablet  -- 1 tab(s) by mouth 2 times a day  -- Indication: For Afib    amLODIPine 5 mg oral tablet  -- 1 tab(s) by mouth once a day (at bedtime)  -- Indication: For Hypertension    omeprazole 40 mg oral delayed release capsule  -- 1 cap(s) by mouth once a day (at bedtime)  -- Indication: For GERD (gastroesophageal reflux disease)    multivitamin  --   once a day  -- Indication: For vitamin I will START or STAY ON the medications listed below when I get home from the hospital:    Rolling Walker: Diagnosis: Weakness  -- Indication: For Gait    aspirin 81 mg oral delayed release tablet  -- 1 tab(s) by mouth once a day  -- Indication: For CVA (cerebral vascular accident)    Tylenol 325 mg oral tablet  -- 2 tab(s) by mouth every 4 hours as needed for mild pain. (** Max daily dose of Tylenol should not exceed 3200mg/24 hrs-Note Tylenol in Norco)  -- Indication: For lt leg pain     traMADol 50 mg oral tablet  -- 1 tab(s) by mouth 3 times a day  -- Indication: For lt leg pain     losartan 50 mg oral tablet  -- 1 tab(s) by mouth once a day (at bedtime)  -- Indication: For Hypertension    warfarin 3 mg oral tablet  -- 1 tab(s) by mouth once a day  -- Do not take this drug if you are pregnant.  It is very important that you take or use this exactly as directed.  Do not skip doses or discontinue unless directed by your doctor.  Obtain medical advice before taking any non-prescription drugs as some may affect the action of this medication.    -- Indication: For CVA (cerebral vascular accident)    gabapentin 100 mg oral capsule  --  by mouth 3 times a day  -- Indication: For leg pain     atorvastatin 40 mg oral tablet  -- 1 tab(s) by mouth once a day (at bedtime)  -- Indication: For CVA (cerebral vascular accident)    metoprolol tartrate 100 mg oral tablet  -- 1 tab(s) by mouth 2 times a day  -- Indication: For Afib    amLODIPine 5 mg oral tablet  -- 1 tab(s) by mouth once a day (at bedtime)  -- Indication: For Hypertension    omeprazole 40 mg oral delayed release capsule  -- 1 cap(s) by mouth once a day (at bedtime)  -- Indication: For GERD (gastroesophageal reflux disease)    multivitamin  --   once a day  -- Indication: For vitamin

## 2017-06-15 NOTE — PROGRESS NOTE ADULT - SUBJECTIVE AND OBJECTIVE BOX
Follow up: PAF, PAD    HPI:  82F PMHx Afib on Coumadin, HLD, CVA, MVP, OA, HTN, PVD (s/p RLE bypassx3, most recently 2012), Neuropathy, GERD presents with right leg pain for past for 2 days. Pain is 8/10 and pain is worse with ambulation. Pt with hx prev vasc surgery 5 years ago in right leg where three grafts were placed as per patient due to poor perfusion. Pt with some generalized weakness today. Pt also notes feeling dizzy for about 2 days which is unusual. Pt denies leg swelling, cp, SOB,  fever or chills.     In ED WBC 10.4 and INR 1.85. US Right leg: The right common femoral artery is patent although its velocity is lower than expected at that level. Below the right groin, there appeared to be 2 bypass grafts, both lacking flow. A patent right popliteal artery is not visualized. No patent named below knee vessels are identified. Right LE Possible iliac artery disease. Bypass graft occlusions. No identifiable runoff vessels, right leg, as described. CXR - No acute pulmonary process demonstrated. EKG NS 65 bpm - unofficial read. Discussed case with Dr Araya, previous vascular surgeon, stated there is no need for urgent surg for this pt based on prior hx and prev exams, refusing transfer at this time.    the patient has had no new events overnight. She has been seen by Dr. Sánchez of vascular surgery this morning who feels that further vascular interventions are not an option with end-stage PAD of the right leg and multiple failed bypasses.    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  Osteoarthritis  Afib  PVD (peripheral vascular disease)  Neuropathy: (Right lower leg)  Gout  H/O cerebral aneurysm repair: brain clips  CVA (cerebral vascular accident): (&quot;Mini-stroke&quot;,1990&#x27;s)  Rheumatoid arthritis  Asthma  Hyperlipidemia  Hypertension  S/P cataract surgery: (Left eye)  Elective surgery: (&quot;Twisted bowel&quot;, 2014)  Elective surgery: (Exision of cyst on liver, 1985)  S/P ORIF (open reduction internal fixation) fracture: Left hip fx (2012) &amp; R hip fx (2013)  H/O cerebral aneurysm repair: Brain clips (1978(  Renal stone: Cysto stent placement 10/1/2014  PVD (peripheral vascular disease): s/p RLE bypass x 3, most recent 3/2012 Right external iliac to PT bypass w/ PTFE (2012)  S/P FRED (total abdominal hysterectomy): (1987, Hx of &quot;ovarian cancer?&quot;)      MEDICATIONS  (STANDING):  aspirin enteric coated 81milliGRAM(s) Oral daily  losartan 50milliGRAM(s) Oral daily  gabapentin 100milliGRAM(s) Oral three times a day  atorvastatin 40milliGRAM(s) Oral at bedtime  pantoprazole    Tablet 40milliGRAM(s) Oral before breakfast  amLODIPine   Tablet 5milliGRAM(s) Oral at bedtime  metoprolol 100milliGRAM(s) Oral two times a day  multivitamin 1Tablet(s) Oral daily  enoxaparin Injectable 50milliGRAM(s) SubCutaneous every 24 hours  oxybutynin 5milliGRAM(s) Oral two times a day  traMADol 50milliGRAM(s) Oral three times a day    MEDICATIONS  (PRN):  acetaminophen   Tablet 650milliGRAM(s) Oral every 6 hours PRN For Temp greater than 38.5 C (101.3 F)    Vital Signs Last 24 Hrs  T(C): 36.8, Max: 36.9 (06-14 @ 11:13)  T(F): 98.3, Max: 98.5 (06-14 @ 11:13)  HR: 65 (60 - 71)  BP: 133/76 (117/81 - 134/77)  BP(mean): --  RR: 17 (14 - 17)  SpO2: 95% (94% - 98%)    I&O's Summary      PHYSICAL EXAM:    Constitutional: NAD,  Eyes:  Pupils round, no lesions  ENMT: no exudate or erythema  Pulmonary: Non-labored, breath sounds are clear bilaterally, No wheezing, rales or rhonchi  Cardiovascular: PMI not palpable Regular S1 and S2, no murmurs, rubs, gallops or clicks; no distal palpable pulses  Gastrointestinal: Bowel Sounds present, soft, nontender.   Lymph:  No cervical lymphadenopathy.  Neurological: Alert, no focal deficits  Skin: No rashes. Changes of chronic venous stasis; small skin ulcer on the ventral aspect of the right second toe  Psych:  Mood & affect appropriate                                 10.6   5.7   )-----------( 162      ( 15 Osmani 2017 08:01 )             33.4     CBC Full  -  ( 15 Osmani 2017 08:01 )  WBC Count : 5.7 K/uL  Hemoglobin : 10.6 g/dL  Hematocrit : 33.4 %  Platelet Count - Automated : 162 K/uL  Mean Cell Volume : 93.1 fl  Mean Cell Hemoglobin : 29.5 pg  Mean Cell Hemoglobin Concentration : 31.7 gm/dL  Auto Neutrophil # : x  Auto Lymphocyte # : x  Auto Monocyte # : x  Auto Eosinophil # : x  Auto Basophil # : x  Auto Neutrophil % : x  Auto Lymphocyte % : x  Auto Monocyte % : x  Auto Eosinophil % : x  Auto Basophil % : x    06-15    142  |  107  |  26<H>  ----------------------------<  80  4.0   |  30  |  1.10    Ca    8.4<L>      15 Osmani 2017 08:01    TPro  6.6  /  Alb  3.4  /  TBili  1.1  /  DBili  x   /  AST  17  /  ALT  15  /  AlkPhos  73  06-14      Ventricular Rate 65 BPM    Atrial Rate 65 BPM    P-R Interval 154 ms    QRS Duration 94 ms    Q-T Interval 442 ms    QTC Calculation(Bezet) 459 ms    P Axis 45 degrees    R Axis -33 degrees    T Axis 0 degrees    Diagnosis Line Normal sinus rhythm  Left axis deviation  Septal infarct (cited on or before 18-APR-2017)  Abnormal ECG    Confirmed by Antwan GILBERT, Pascual ([id number]) on 6/15/2017 10:09:36 AM      EXAM:  CHEST 1 VIEW                            PROCEDURE DATE:  06/14/2017        INTERPRETATION:  Chest portable.    Clinical History: Weakness with right leg pain.    Comparison: 4/18/2017.    Single AP view submitted.    The evaluation of the cardiomediastinal silhouette is limited on portable   technique.  There is a mildly tortuous, calcified aorta.    There is mild prominence the bronchovascular markings at the right lung   base.  No lobar lung consolidation, pleural effusion or pneumothorax is noted.    Impression:    No acute pulmonary process demonstrated.                    FLAVIO BARAHONA M.D., ATTENDING RADIOLOGIST  This document has been electronically signed. Jun 14 2017 12:38PM               EXAM:  ECHO TTE W/O CON COMP W/DOPPLR           PROCEDURE DATE:  07/15/2015      INTERPRETATION:  Ordering Physician: JARRETT BURNETT    Indication: Abnormal EKG    Study Quality: Technically difficult   A complete echocardiographic study was performed utilizing standard   protocol including spectral and color Doppler in all echocardiographic   windows.    Height: 155 cm  Weight: 63 kg  BSA: 1.62 sq m  Blood Pressure: 140/68    MEASUREMENTS  IVS: 0.9cm  PWT: 0.8cm  LA: 4.8cm  AO: 3.0cm  LVIDd: 4.9cm  LVIDs: 3.2cm    LVEF: 65%  RVSP: 30mmHg    FINDINGS  Left Ventricle: The endocardium is not well-visualized. Grossly, there   appears to be preserved left ventricular systolic function.   Aortic Valve: Calcified trileaflet aortic valve. Mild aortic   insufficiency.  Mitral Valve: Mitral annular calcification and thickened mitral valve   leaflets with normal diastolic opening. Mild to moderate mitral   insufficiency.  Tricuspid Valve: Mild tricuspid insufficiency.  Pulmonic Valve: No pulmonic insufficiency.  Left Atrium: Moderate left atrial enlargement. Intra-atrial septum is   hypermobile.  Right Ventricle: The right ventricle is not well visualized. Grossly, it   appears to be normal in size with preserved systolic function.  Right Atrium: Normal  Diastolic Function: Normal  Pericardium/Pleura: Normal pericardium with no pericardial effusion.   Small bilateral pleural effusions.                 ANNY FAY M.D., ATTENDING CARDIOLOGIST  This document has been electronically signed. Jul 16 2015 12:26P

## 2017-06-15 NOTE — PROGRESS NOTE ADULT - SUBJECTIVE AND OBJECTIVE BOX
HPI:  82F PMHx Afib on Coumadin, HLD, CVA, MVP, OA, HTN, PVD (s/p RLE bypassx3, most recently ), Neuropathy, GERD presents with right leg pain for past for 2 days. Pain is 8/10 and pain is worse with ambulation. Pt with hx prev vasc surgery 5 years ago in right leg where three grafts were placed as per patient due to poor perfusion. US Right leg: The right common femoral artery is patent although its velocity is lower than expected at that level. Below the right groin, there appeared to be 2 bypass grafts, both lacking flow. A patent right popliteal artery is not visualized. No patent named below knee vessels are identified. Right LE Possible iliac artery disease. Bypass graft occlusions. No identifiable runoff vessels, right leg, as described.  Discussed case with Dr Araya, previous vascular surgeon, stated there is no need for urgent surgery  for this pt based on prior hx and prev exams, refusing transfer at this time. Discussed case with Dr Sánchez, will see pt, no heparin drip at this time, not a surgical candidate pt might need amputation in future. Pt still complains of leg pain, tramadol was increased to TID.    REVIEW OF SYSTEMS:    CONSTITUTIONAL: No weakness, fevers or chills  EYES/ENT: No visual changes, no throat pain   RESPIRATORY: No cough, wheezing, hemoptysis; No shortness of breath  CARDIOVASCULAR: No chest pain or palpitations  GASTROINTESTINAL: No abdominal, nausea, vomiting, or hematemesis; No diarrhea or constipation. No melena or hematochezia.  GENITOURINARY: No dysuria, frequency or hematuria  NEUROLOGICAL: No dizziness, numbness, or weakness  SKIN: No itching, burning, rashes, or lesions   MSK- Right leg pain  All other review of systems is negative unless indicated above.    Vital Signs Last 24 Hrs  T(C): 36.8, Max: 36.8 (-15 @ 04:10)  T(F): 98.3, Max: 98.3 (-15 @ 04:10)  HR: 65 (65 - 65)  BP: 133/76 (133/76 - 133/76)  BP(mean): --  RR: 17 (17 - 17)  SpO2: 95% (95% - 95%)  Wt(kg): --    PHYSICAL EXAM:     GENERAL: no acute distress  HEENT: NC/AT, EOMI, neck supple, MMM  RESPIRATORY: LCTAB/L, no rhonchi, rales, or wheezing  CARDIOVASCULAR: RRR, no murmurs, gallops, rubs  ABDOMINAL: soft, non-tender, non-distended, positive bowel sounds   EXTREMITIES: no clubbing, cyanosis, or edema  NEUROLOGICAL: alert and oriented x 3, non-focal  SKIN: no rashes or lesions   MUSCULOSKELETAL: right leg pain, right leg cool to touch compared to left, ulcer on right second digit    LABS:                        10.6   5.7   )-----------( 162      ( 15 Osmani 2017 08:01 )             33.4     06-15    142  |  107  |  26<H>  ----------------------------<  80  4.0   |  30  |  1.10    Ca    8.4<L>      15 Osmani 2017 08:01    TPro  6.6  /  Alb  3.4  /  TBili  1.1  /  DBili  x   /  AST  17  /  ALT  15  /  AlkPhos  73  06-14    PT/INR - ( 15 Osmani 2017 08:01 )   PT: 23.4 sec;   INR: 2.11 ratio         PTT - ( 15 Osmani 2017 08:01 )  PTT:48.5 sec  Urinalysis Basic - ( 2017 14:46 )    Color: Pale Yellow / Appearance: Clear / S.005 / pH: x  Gluc: x / Ketone: Negative  / Bili: Negative / Urobili: Negative   Blood: x / Protein: Negative / Nitrite: Positive   Leuk Esterase: Small / RBC: x / WBC 11-25   Sq Epi: x / Non Sq Epi: Occasional / Bacteria: Moderate      CAPILLARY BLOOD GLUCOSE      MEDICATIONS  (STANDING):  aspirin enteric coated 81milliGRAM(s) Oral daily  losartan 50milliGRAM(s) Oral daily  gabapentin 100milliGRAM(s) Oral three times a day  atorvastatin 40milliGRAM(s) Oral at bedtime  pantoprazole    Tablet 40milliGRAM(s) Oral before breakfast  amLODIPine   Tablet 5milliGRAM(s) Oral at bedtime  metoprolol 100milliGRAM(s) Oral two times a day  multivitamin 1Tablet(s) Oral daily  enoxaparin Injectable 50milliGRAM(s) SubCutaneous every 24 hours  oxybutynin 5milliGRAM(s) Oral two times a day  traMADol 50milliGRAM(s) Oral three times a day      Radiology: HPI: c/o rt leg pain   82F PMHx Afib on Coumadin, HLD, CVA, MVP, OA, HTN, PVD (s/p RLE bypassx3, most recently ), Neuropathy, GERD presents with right leg pain for past for 2 days. Pain is 8/10 and pain is worse with ambulation. Pt with hx prev vasc surgery 5 years ago in right leg where three grafts were placed as per patient due to poor perfusion. US Right leg: The right common femoral artery is patent although its velocity is lower than expected at that level. Below the right groin, there appeared to be 2 bypass grafts, both lacking flow. A patent right popliteal artery is not visualized. No patent named below knee vessels are identified. Right LE Possible iliac artery disease. Bypass graft occlusions. No identifiable runoff vessels, right leg, as described.  Discussed case with Dr Araya, previous vascular surgeon, stated there is no need for urgent surgery  for this pt based on prior hx and prev exams, refusing transfer at this time. Discussed case with Dr Sánchez,, no heparin drip at this time, not a surgical candidate pt might need amputation in future. Pt still complains of rt leg pain, tramadol was increased to TID.    REVIEW OF SYSTEMS:    CONSTITUTIONAL: No weakness, fevers or chills  EYES/ENT: No visual changes, no throat pain   RESPIRATORY: No cough, wheezing, hemoptysis; No shortness of breath  CARDIOVASCULAR: No chest pain or palpitations  GASTROINTESTINAL: No abdominal, nausea, vomiting, or hematemesis; No diarrhea or constipation. No melena or hematochezia.  GENITOURINARY: No dysuria, frequency or hematuria  NEUROLOGICAL: No dizziness, numbness, or weakness  SKIN: No itching, burning, rashes, or lesions   MSK- Right leg pain  All other review of systems is negative unless indicated above.    Vital Signs Last 24 Hrs  T(C): 36.8, Max: 36.8 (-15 @ 04:10)  T(F): 98.3, Max: 98.3 (-15 @ 04:10)  HR: 65 (65 - 65)  BP: 133/76 (133/76 - 133/76)  BP(mean): --  RR: 17 (17 - 17)  SpO2: 95% (95% - 95%)  Wt(kg): --    PHYSICAL EXAM:     GENERAL: no acute distress  HEENT: NC/AT, EOMI, neck supple, MMM  RESPIRATORY: LCTAB/L, no rhonchi, rales, or wheezing  CARDIOVASCULAR: RRR, no murmurs, gallops, rubs  ABDOMINAL: soft, non-tender, non-distended, positive bowel sounds   EXTREMITIES: no clubbing, cyanosis, or edema   pulse decrease rt low ext / lt low ext pulse present.  NEUROLOGICAL: alert and oriented x 3, non-focal  SKIN: no rashes or lesions   MUSCULOSKELETAL: right leg pain, right leg cool to touch compared to left, ulcer on right second digit    LABS:                        10.6   5.7   )-----------( 162      ( 15 Osmani 2017 08:01 )             33.4     06-15    142  |  107  |  26<H>  ----------------------------<  80  4.0   |  30  |  1.10    Ca    8.4<L>      15 Osmani 2017 08:01    TPro  6.6  /  Alb  3.4  /  TBili  1.1  /  DBili  x   /  AST  17  /  ALT  15  /  AlkPhos  73  06-14    PT/INR - ( 15 Osmani 2017 08:01 )   PT: 23.4 sec;   INR: 2.11 ratio         PTT - ( 15 Osmani 2017 08:01 )  PTT:48.5 sec  Urinalysis Basic - ( 2017 14:46 )    Color: Pale Yellow / Appearance: Clear / S.005 / pH: x  Gluc: x / Ketone: Negative  / Bili: Negative / Urobili: Negative   Blood: x / Protein: Negative / Nitrite: Positive   Leuk Esterase: Small / RBC: x / WBC 11-25   Sq Epi: x / Non Sq Epi: Occasional / Bacteria: Moderate      CAPILLARY BLOOD GLUCOSE      MEDICATIONS  (STANDING):  aspirin enteric coated 81milliGRAM(s) Oral daily  losartan 50milliGRAM(s) Oral daily  gabapentin 100milliGRAM(s) Oral three times a day  atorvastatin 40milliGRAM(s) Oral at bedtime  pantoprazole    Tablet 40milliGRAM(s) Oral before breakfast  amLODIPine   Tablet 5milliGRAM(s) Oral at bedtime  metoprolol 100milliGRAM(s) Oral two times a day  multivitamin 1Tablet(s) Oral daily  enoxaparin Injectable 50milliGRAM(s) SubCutaneous every 24 hours  oxybutynin 5milliGRAM(s) Oral two times a day  traMADol 50milliGRAM(s) Oral three times a day      Radiology:  no new test

## 2017-06-15 NOTE — CONSULT NOTE ADULT - SUBJECTIVE AND OBJECTIVE BOX
Patient is a 82y old  Female who presents with a chief complaint of weakness and pain both legs (15 Osmani 2017 10:50)      HPI:  82F PMHx Afib on Coumadin, HLD, CVA, MVP, OA, HTN, PVD (s/p RLE bypassx3, most recently 2012), Neuropathy, GERD presents with right leg pain for past for 2 days. Pain is 8/10 and pain is worse with ambulation. Pt with hx prev vasc surgery 5 years ago in right leg where three grafts were placed as per patient due to poor perfusion. Pt with some generalized weakness today. Pt also notes feeling dizzy for about 2 days which is unusual. Pt denies leg swelling, cp, SOB,  fever or chills.     In ED WBC 10.4 and INR 1.85. US Right leg: The right common femoral artery is patent although its velocity is lower than expected at that level. Below the right groin, there appeared to be 2 bypass grafts, both lacking flow. A patent right popliteal artery is not visualized. No patent named below knee vessels are identified. Right LE Possible iliac artery disease. Bypass graft occlusions. No identifiable runoff vessels, right leg, as described. CXR - No acute pulmonary process demonstrated. EKG NS 65 bpm - unofficial read. Discussed case with Dr Araya, previous vascular surgeon, stated there is no need for urgent surg for this pt based on prior hx and prev exams, refusing transfer at this time. Discussed case with Dr Sánchez, will see pt, no heparin drip at this time. 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  Osteoarthritis  Afib  PVD (peripheral vascular disease)  Neuropathy: (Right lower leg)  Gout  H/O cerebral aneurysm repair: brain clips  CVA (cerebral vascular accident): (&quot;Mini-stroke&quot;,1990&#x27;s)  Rheumatoid arthritis  Asthma  Hyperlipidemia  Hypertension  S/P cataract surgery: (Left eye)  Elective surgery: (&quot;Twisted bowel&quot;, 2014)  Elective surgery: (Exision of cyst on liver, 1985)  S/P ORIF (open reduction internal fixation) fracture: Left hip fx (2012) &amp; R hip fx (2013)  H/O cerebral aneurysm repair: Brain clips (1978(  Renal stone: Cysto stent placement 10/1/2014  PVD (peripheral vascular disease): s/p RLE bypass x 3, most recent 3/2012 Right external iliac to PT bypass w/ PTFE (2012)  S/P FRED (total abdominal hysterectomy): (1987, Hx of &quot;ovarian cancer?&quot;)      SOCIAL HISTORY:    FAMILY HISTORY:  Family history of aneurysm: mother      MEDICATIONS  (STANDING):  aspirin enteric coated 81milliGRAM(s) Oral daily  losartan 50milliGRAM(s) Oral daily  gabapentin 100milliGRAM(s) Oral three times a day  atorvastatin 40milliGRAM(s) Oral at bedtime  pantoprazole    Tablet 40milliGRAM(s) Oral before breakfast  amLODIPine   Tablet 5milliGRAM(s) Oral at bedtime  metoprolol 100milliGRAM(s) Oral two times a day  multivitamin 1Tablet(s) Oral daily  oxybutynin 5milliGRAM(s) Oral two times a day  traMADol 50milliGRAM(s) Oral three times a day    MEDICATIONS  (PRN):  acetaminophen   Tablet 650milliGRAM(s) Oral every 6 hours PRN For Temp greater than 38.5 C (101.3 F)      Allergies    No Known Allergies    Intolerances        Vital Signs Last 24 Hrs  T(C): 36.5, Max: 36.8 (06-15 @ 04:10)  T(F): 97.7, Max: 98.3 (06-15 @ 04:10)  HR: 66 (62 - 66)  BP: 129/76 (129/76 - 145/83)  BP(mean): --  RR: 16 (15 - 17)  SpO2: 95% (95% - 96%)    PHYSICAL EXAM  General: adult in NAD  HEENT: clear oropharynx, anicteric sclera, pink conjunctivae  Neck: supple  CV: normal S1S2 with no murmur rubs or gallops  Lungs: clear to auscultation, no wheezes, no rhales  Abdomen: soft non-tender non-distended, no hepato/splenomegaly  Ext: no clubbing cyanosis or edema  Skin: no rashes and no petichiae  Neuro: alert and oriented X3 no focal deficits      LABS:    CBC Full  -  ( 15 Osmani 2017 08:01 )  WBC Count : 5.7 K/uL  Hemoglobin : 10.6 g/dL  Hematocrit : 33.4 %  Platelet Count - Automated : 162 K/uL  Mean Cell Volume : 93.1 fl  Mean Cell Hemoglobin : 29.5 pg  Mean Cell Hemoglobin Concentration : 31.7 gm/dL  Auto Neutrophil # : x  Auto Lymphocyte # : x  Auto Monocyte # : x  Auto Eosinophil # : x  Auto Basophil # : x  Auto Neutrophil % : x  Auto Lymphocyte % : x  Auto Monocyte % : x  Auto Eosinophil % : x  Auto Basophil % : x    06-15    142  |  107  |  26<H>  ----------------------------<  80  4.0   |  30  |  1.10    Ca    8.4<L>      15 Osmani 2017 08:01    TPro  6.6  /  Alb  3.4  /  TBili  1.1  /  DBili  x   /  AST  17  /  ALT  15  /  AlkPhos  73  06-14    PT/INR - ( 15 Osmani 2017 08:01 )   PT: 23.4 sec;   INR: 2.11 ratio         PTT - ( 15 Osmani 2017 08:01 )  PTT:48.5 sec          BLOOD SMEAR INTERPRETATION:    RADIOLOGY & ADDITIONAL STUDIES:    EXAM:  US DPLX OssDsign ABR EXT ARTS LTD RT                            PROCEDURE DATE:  06/14/2017        INTERPRETATION:  Arterial Duplex Ultrasound of the right lower extremity    Clinical information: Right leg pain and weakness. The patient has   undergone vascular bypass surgery in the past.    Sonographic evaluation of the lower extremity arteries to the level of   the right foot was performed using gray-scale and color Doppler imaging.    Interpretation:     Right leg: The right common femoral artery is patent although its   velocity is lower than expected at that level. Below the right groin,   there appeared to be 2 bypass grafts, both lacking flow. A patent right   popliteal artery is not visualized. No patent named below knee vessels   are identified.        IMPRESSION: Possible iliac artery disease. Bypass graft occlusions. No   identifiable runoff vessels, right leg, as described.    The above findings were conveyed by telephone to Dr. Flores in the   emergency department at 1:45 PMon the afternoon of the examination.    Thank you for this referral.              TREY ROOT M.D., ATTENDING RADIOLOGIST  This document has been electronically signed. Jun 14 2017  1:48PM
[facility]         Dahiana Bañuelos, [place], Toney, Truman Falcon        [phone] (office)    CHIEF COMPLAINT: Patient is a 82y old  Female who presents with a chief complaint of weakness and pain both legs (14 Jun 2017 15:33)      HPI:[id number]   The pt is an 82 year-old woman with a history of hypercholesterolemia, hypertension, rheumatoid arthritis, cerebral aneurysm status post clipping, PAD status post femoropopliteal bypass with subsequent right leg femoropopliteal bypass thrombosis and infected wound, redo bypass with failure, then stents with failure.  She has a history of paroxysmal atrial fibrillation previously on AC with Eliquis which was converted to Coumadin after an episode of GI bleeding. She  was admitted to [facility] with abdominal pain and was found a bowel obstruction requiring a emergent laparotomy and reduction of closed loop with short segment of SB excised. Her course was complicated by bleeding from the anastomosis site in the setting of an increased INR. She was stabilized and then subsequently sent to rehabilitation. Then she had a fall with a hip fracture from a mechanical fall and had ORIF.     She was last evaluated by vascular surgery in 10/2016.  At that time she was found to have markedly reduced perfusion of the right leg, and medical management was advised, with BKA reserved for failure of medical therapy.  No additional revascularization was contemplated.    She has been feeling well from a cardiac perspective, without angina , symptoms of heart failure, and symptoms of significant arrhythmia. Her last echo was 5/3/2017, revealing normal lv and rv fxn, without significant valvular disease.    She described subacute worsening of her exremity symptoms, but with progressive discomfort in both legs, right>left, at rest.  She has had a non-healing dry ulcer onthe dorsal aspect of the right 2nd toe, cared for as an outpt.  She has been concerned about the bilateral pain in her legs and therefore comes to the ER for further evaluation.    In ED WBC 10.4 and INR 1.85. Sonography revealed occluded bypass grafts in the right leg, and no patent vessels identified.  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  Osteoarthritis  paf  PVD (peripheral vascular disease)  Neuropathy: (Right lower leg)  Gout  H/O cerebral aneurysm repair: brain clips  CVA (cerebral vascular accident): (&quot;Mini-stroke&quot;,1990&#x27;s)  Rheumatoid arthritis  Asthma  Hyperlipidemia  Hypertension  S/P cataract surgery: (Left eye)  Elective surgery: (&quot;Twisted bowel&quot;, 2014)  Elective surgery: (Exision of cyst on liver, 1985)  S/P ORIF (open reduction internal fixation) fracture: Left hip fx (2012) &amp; R hip fx (2013)  H/O cerebral aneurysm repair: Brain clips (1978(  Renal stone: Cysto stent placement 10/1/2014  PVD (peripheral vascular disease): s/p RLE bypass x 3, most recent 3/2012 Right external iliac to PT bypass w/ PTFE (2012)  S/P FRED (total abdominal hysterectomy): (1987, Hx of &quot;ovarian cancer?&quot;)      SOCIAL HISTORY: No active tobacco, alcohol or illicit drug use    FAMILY HISTORY:  Family history of aneurysm: mother      Outpatient medications:    MEDICATIONS  (STANDING):  aspirin enteric coated 81milliGRAM(s) Oral daily  traMADol 50milliGRAM(s) Oral two times a day  losartan 50milliGRAM(s) Oral daily  gabapentin 100milliGRAM(s) Oral three times a day  atorvastatin 40milliGRAM(s) Oral at bedtime  pantoprazole    Tablet 40milliGRAM(s) Oral before breakfast  amLODIPine   Tablet 5milliGRAM(s) Oral at bedtime  metoprolol 100milliGRAM(s) Oral two times a day  multivitamin 1Tablet(s) Oral daily  enoxaparin Injectable 50milliGRAM(s) SubCutaneous every 24 hours  warfarin 3milliGRAM(s) Oral once  oxybutynin 5milliGRAM(s) Oral two times a day    MEDICATIONS  (PRN):  acetaminophen   Tablet 650milliGRAM(s) Oral every 6 hours PRN For Temp greater than 38.5 C (101.3 F)      Allergies    No Known Allergies    Intolerances        REVIEW OF SYSTEMS: Is negative for eye, ENT, GI, , allergic, dermatologic, musculoskeletal and neurologic, except as described above.    VITAL SIGNS:   Vital Signs Last 24 Hrs  T(C): 36.9, Max: 36.9 (06-14 @ 11:13)  T(F): 98.4, Max: 98.5 (06-14 @ 11:13)  HR: 66 (66 - 71)  BP: 130/68 (117/81 - 130/68)  BP(mean): --  RR: 16 (14 - 16)  SpO2: 97% (97% - 97%)    I&O's Summary      PHYSICAL EXAM:    Constitutional: NAD, awake and alert, well-developed  Eyes:  EOMI, no oral cyanosis, conjunctivae clear, anicteric.  Pulmonary: Non-labored, breath sounds are clear bilaterally, no wheezing, rales or rhonchi  Cardiovascular:  regular S1 and S2. No murmur.  No rubs, gallops or clicks  Gastrointestinal: Bowel Sounds present, soft, nontender.   Lymph: No peripheral edema.   Neurological: Alert, strength and sensitivity are grossly intact  Skin: small ulcer ventral aspect right 2nd toe. right foot cooler than left. no pulses  Psych:  Mood & affect appropriate .    LABS: All Labs Reviewed:                        10.4   9.0   )-----------( 180      ( 14 Jun 2017 11:47 )             32.3     14 Jun 2017 11:47    137    |  106    |  36     ----------------------------<  85     4.3     |  24     |  1.20     Ca    8.8        14 Jun 2017 11:47    TPro  6.6    /  Alb  3.4    /  TBili  1.1    /  DBili  x      /  AST  17     /  ALT  15     /  AlkPhos  73     14 Jun 2017 11:47    PT/INR - ( 14 Jun 2017 11:47 )   PT: 20.4 sec;   INR: 1.85 ratio         PTT - ( 14 Jun 2017 11:47 )  PTT:45.3 sec      Blood Culture:         RADIOLOGY:    EKG:sr poor rwp  septal mi age indeterminate
History of Present Illness:  82y Female with a history of  PAD and multiple  medical issues  presents with a several day hx. of RLE pain and generalized weakness. Patient has had multiple bypass procedures on her right leg which have been occluded for some time. According to her vascular surgeon ( Dr. Araya)  there are no other revascularization procedures that can be done. There are no outflow target vessels. The patient has a superficial ulcer on the right 2nd. toe x 2-3 months.    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  Osteoarthritis  Afib  PVD (peripheral vascular disease)  Neuropathy: (Right lower leg)  Gout  H/O cerebral aneurysm repair: brain clips  CVA (cerebral vascular accident): (&quot;Mini-stroke&quot;,1990&#x27;s)  Rheumatoid arthritis  Asthma  Hyperlipidemia  Hypertension  S/P cataract surgery: (Left eye)  Elective surgery: (&quot;Twisted bowel&quot;, 2014)  Elective surgery: (Exision of cyst on liver, 1985)  S/P ORIF (open reduction internal fixation) fracture: Left hip fx (2012) &amp; R hip fx (2013)  H/O cerebral aneurysm repair: Brain clips (1978(  Renal stone: Cysto stent placement 10/1/2014  PVD (peripheral vascular disease): s/p RLE bypass x 3, most recent 3/2012 Right external iliac to PT bypass w/ PTFE (2012)  S/P FRED (total abdominal hysterectomy): (1987, Hx of &quot;ovarian cancer?&quot;)      Allergies    No Known Allergies    Intolerances        MEDICATIONS  (STANDING):  aspirin enteric coated 81milliGRAM(s) Oral daily  traMADol 50milliGRAM(s) Oral two times a day  losartan 50milliGRAM(s) Oral daily  gabapentin 100milliGRAM(s) Oral three times a day  atorvastatin 40milliGRAM(s) Oral at bedtime  pantoprazole    Tablet 40milliGRAM(s) Oral before breakfast  amLODIPine   Tablet 5milliGRAM(s) Oral at bedtime  metoprolol 100milliGRAM(s) Oral two times a day  multivitamin 1Tablet(s) Oral daily  enoxaparin Injectable 50milliGRAM(s) SubCutaneous every 24 hours  oxybutynin 5milliGRAM(s) Oral two times a day    MEDICATIONS  (PRN):  acetaminophen   Tablet 650milliGRAM(s) Oral every 6 hours PRN For Temp greater than 38.5 C (101.3 F)      Social History:  Smoking History :stopped 20 years ago  Alcohol Use: social    REVIEW OF SYSTEMS:  CONSTITUTIONAL: No weakness, fevers or chills  EYES/ENT: No visual changes;  No vertigo or throat pain   NECK: No pain or stiffness  RESPIRATORY: No cough, wheezing, hemoptysis; No shortness of breath  CARDIOVASCULAR: No chest pain or palpitations  GASTROINTESTINAL: No abdominal or epigastric pain. No nausea, vomiting, or hematemesis; No diarrhea or constipation. No melena or hematochezia.  GENITOURINARY: No dysuria, frequency or hematuria  NEUROLOGICAL: No numbness or weakness  SKIN: No itching, burning, rashes, or lesions   Vascular:  intermittent right pedal rest pain  and right 2nd toe  digital ulcers  All other review of systems is negative unless indicated above.    PHYSICAL EXAM:  General:  On exam, the patient is alert nontoxic appearing Female in no acute distress.  Vital Signs Last 24 Hrs  T(C): 36.8, Max: 36.9 (06-14 @ 11:13)  T(F): 98.3, Max: 98.5 (06-14 @ 11:13)  HR: 65 (60 - 71)  BP: 133/76 (117/81 - 134/77)  BP(mean): --  RR: 17 (14 - 17)  SpO2: 95% (94% - 98%)    Neck:  4+/4+ bilateral carotid pulses; no carotid bruit, no palpable cervical masses.  Heart:  Regular, no murmurs, rubs or gallops.    Lungs:  Clear to auscultation.    Chest:  No chest wall deformities  Symmetrical chest expansion.   Abdomen: Soft and nontender.  No palpable masses.  No rebound, guarding or rigidity.  Extremities:  Right foot is cool with rubor color..  There is a superficial 4x4 mm ulcer on dorsum of right  2nd. toe. .  The calf and thigh muscles are soft and nontender.  There are no palpable cords or limb cellulitis.  Sienna's in is negative bilaterally.  There is no lower extremity edema or   cyanosis,   On examination of the peripheral pulses:  Left leg femoral pulse is 3/4   , popliteal pulse is  2/2  ,PT Pulse is   0 , DP Pulse is 0  Right leg femoral pulse is 1/4   ,popliteal pulse is  0  , PT Pulse is  0 , DP Pulse is 0   There are good doppler signals over left DP and PT vessels. There is a feeble signal over the right PT vessel only  Neurological:  There are no motor or sensory deficits in either lower extremity.                          10.4   9.0   )-----------( 180      ( 14 Jun 2017 11:47 )             32.3     06-14    137  |  106  |  36<H>  ----------------------------<  85  4.3   |  24  |  1.20    Ca    8.8      14 Jun 2017 11:47    TPro  6.6  /  Alb  3.4  /  TBili  1.1  /  DBili  x   /  AST  17  /  ALT  15  /  AlkPhos  73  06-14        PT/INR - ( 14 Jun 2017 11:47 )   PT: 20.4 sec;   INR: 1.85 ratio         PTT - ( 14 Jun 2017 11:47 )  PTT:45.3 sec    Radiology:

## 2017-06-15 NOTE — DISCHARGE NOTE ADULT - PLAN OF CARE
stable continue coumadin, f/u INR on Monday  f/u with PMD on monday  f/u with vascular surgery on coumadin /  fu inr on coumadin/ satin low salt diet / on meds on meds / low cholest on meds chr

## 2017-06-15 NOTE — PHYSICAL THERAPY INITIAL EVALUATION ADULT - ADDITIONAL COMMENTS
Pt lives in a senior housing apt. Pt has no stairs to climb. Pt owns a wheeled walker and a rollator. Pt has a home health 6 hrs day

## 2017-06-15 NOTE — PROGRESS NOTE ADULT - ATTENDING COMMENTS
82F PMHx Afib on Coumadin, HLD, CVA, MVP, OA, HTN, PVD (s/p RLE bypassx3, most recently 2012), Neuropathy, GERD presents with right leg pain for past for 2 days a/w arterial occlusion with claudication / pt seen by dr ledesma no further surgical intervention will need bka but pt refusing this time . plan pain control  for now and dc after pt evaluation in am as no need rehab . chr afib and  hx cva on coumadin cont inr in bw 2 to 3.

## 2017-06-16 VITALS
DIASTOLIC BLOOD PRESSURE: 83 MMHG | RESPIRATION RATE: 16 BRPM | TEMPERATURE: 99 F | SYSTOLIC BLOOD PRESSURE: 130 MMHG | OXYGEN SATURATION: 97 % | HEART RATE: 64 BPM

## 2017-06-16 LAB
-  AMIKACIN: SIGNIFICANT CHANGE UP
-  AMPICILLIN/SULBACTAM: SIGNIFICANT CHANGE UP
-  AMPICILLIN: SIGNIFICANT CHANGE UP
-  AZTREONAM: SIGNIFICANT CHANGE UP
-  CEFAZOLIN: SIGNIFICANT CHANGE UP
-  CEFEPIME: SIGNIFICANT CHANGE UP
-  CEFOXITIN: SIGNIFICANT CHANGE UP
-  CEFTAZIDIME: SIGNIFICANT CHANGE UP
-  CEFTRIAXONE: SIGNIFICANT CHANGE UP
-  CIPROFLOXACIN: SIGNIFICANT CHANGE UP
-  ERTAPENEM: SIGNIFICANT CHANGE UP
-  GENTAMICIN: SIGNIFICANT CHANGE UP
-  IMIPENEM: SIGNIFICANT CHANGE UP
-  LEVOFLOXACIN: SIGNIFICANT CHANGE UP
-  MEROPENEM: SIGNIFICANT CHANGE UP
-  NITROFURANTOIN: SIGNIFICANT CHANGE UP
-  PIPERACILLIN/TAZOBACTAM: SIGNIFICANT CHANGE UP
-  TOBRAMYCIN: SIGNIFICANT CHANGE UP
-  TRIMETHOPRIM/SULFAMETHOXAZOLE: SIGNIFICANT CHANGE UP
ANION GAP SERPL CALC-SCNC: 5 MMOL/L — SIGNIFICANT CHANGE UP (ref 5–17)
APTT BLD: 48.5 SEC — HIGH (ref 27.5–37.4)
BUN SERPL-MCNC: 30 MG/DL — HIGH (ref 7–23)
CALCIUM SERPL-MCNC: 8.4 MG/DL — LOW (ref 8.5–10.1)
CHLORIDE SERPL-SCNC: 109 MMOL/L — HIGH (ref 96–108)
CO2 SERPL-SCNC: 29 MMOL/L — SIGNIFICANT CHANGE UP (ref 22–31)
CREAT SERPL-MCNC: 1.3 MG/DL — SIGNIFICANT CHANGE UP (ref 0.5–1.3)
CULTURE RESULTS: SIGNIFICANT CHANGE UP
GLUCOSE SERPL-MCNC: 90 MG/DL — SIGNIFICANT CHANGE UP (ref 70–99)
HCT VFR BLD CALC: 32.4 % — LOW (ref 34.5–45)
HGB BLD-MCNC: 10.4 G/DL — LOW (ref 11.5–15.5)
INR BLD: 2.14 RATIO — HIGH (ref 0.88–1.16)
MCHC RBC-ENTMCNC: 29.9 PG — SIGNIFICANT CHANGE UP (ref 27–34)
MCHC RBC-ENTMCNC: 32.1 GM/DL — SIGNIFICANT CHANGE UP (ref 32–36)
MCV RBC AUTO: 93 FL — SIGNIFICANT CHANGE UP (ref 80–100)
METHOD TYPE: SIGNIFICANT CHANGE UP
ORGANISM # SPEC MICROSCOPIC CNT: SIGNIFICANT CHANGE UP
ORGANISM # SPEC MICROSCOPIC CNT: SIGNIFICANT CHANGE UP
PLATELET # BLD AUTO: 158 K/UL — SIGNIFICANT CHANGE UP (ref 150–400)
POTASSIUM SERPL-MCNC: 4.1 MMOL/L — SIGNIFICANT CHANGE UP (ref 3.5–5.3)
POTASSIUM SERPL-SCNC: 4.1 MMOL/L — SIGNIFICANT CHANGE UP (ref 3.5–5.3)
PROTHROM AB SERPL-ACNC: 23.7 SEC — HIGH (ref 9.8–12.7)
RBC # BLD: 3.49 M/UL — LOW (ref 3.8–5.2)
RBC # FLD: 12.9 % — SIGNIFICANT CHANGE UP (ref 10.3–14.5)
SODIUM SERPL-SCNC: 143 MMOL/L — SIGNIFICANT CHANGE UP (ref 135–145)
SPECIMEN SOURCE: SIGNIFICANT CHANGE UP
WBC # BLD: 6.1 K/UL — SIGNIFICANT CHANGE UP (ref 3.8–10.5)
WBC # FLD AUTO: 6.1 K/UL — SIGNIFICANT CHANGE UP (ref 3.8–10.5)

## 2017-06-16 PROCEDURE — 99285 EMERGENCY DEPT VISIT HI MDM: CPT | Mod: 25

## 2017-06-16 PROCEDURE — 80053 COMPREHEN METABOLIC PANEL: CPT

## 2017-06-16 PROCEDURE — 81001 URINALYSIS AUTO W/SCOPE: CPT

## 2017-06-16 PROCEDURE — 93926 LOWER EXTREMITY STUDY: CPT | Mod: 26,LT

## 2017-06-16 PROCEDURE — 93005 ELECTROCARDIOGRAM TRACING: CPT

## 2017-06-16 PROCEDURE — 96372 THER/PROPH/DIAG INJ SC/IM: CPT

## 2017-06-16 PROCEDURE — 85027 COMPLETE CBC AUTOMATED: CPT

## 2017-06-16 PROCEDURE — 87086 URINE CULTURE/COLONY COUNT: CPT

## 2017-06-16 PROCEDURE — 99233 SBSQ HOSP IP/OBS HIGH 50: CPT

## 2017-06-16 PROCEDURE — 87040 BLOOD CULTURE FOR BACTERIA: CPT

## 2017-06-16 PROCEDURE — 83605 ASSAY OF LACTIC ACID: CPT

## 2017-06-16 PROCEDURE — 99239 HOSP IP/OBS DSCHRG MGMT >30: CPT

## 2017-06-16 PROCEDURE — 97162 PT EVAL MOD COMPLEX 30 MIN: CPT

## 2017-06-16 PROCEDURE — 85610 PROTHROMBIN TIME: CPT

## 2017-06-16 PROCEDURE — 87186 SC STD MICRODIL/AGAR DIL: CPT

## 2017-06-16 PROCEDURE — 80048 BASIC METABOLIC PNL TOTAL CA: CPT

## 2017-06-16 PROCEDURE — 93926 LOWER EXTREMITY STUDY: CPT

## 2017-06-16 PROCEDURE — 71045 X-RAY EXAM CHEST 1 VIEW: CPT

## 2017-06-16 PROCEDURE — 85730 THROMBOPLASTIN TIME PARTIAL: CPT

## 2017-06-16 RX ORDER — WARFARIN SODIUM 2.5 MG/1
1 TABLET ORAL
Qty: 7 | Refills: 0 | OUTPATIENT
Start: 2017-06-16 | End: 2017-06-23

## 2017-06-16 RX ORDER — SOLIFENACIN SUCCINATE 10 MG/1
1 TABLET ORAL
Qty: 0 | Refills: 0 | COMMUNITY

## 2017-06-16 RX ORDER — TRAMADOL HYDROCHLORIDE 50 MG/1
1 TABLET ORAL
Qty: 0 | Refills: 0 | COMMUNITY
Start: 2017-06-16

## 2017-06-16 RX ORDER — WARFARIN SODIUM 2.5 MG/1
0 TABLET ORAL
Qty: 0 | Refills: 0 | COMMUNITY

## 2017-06-16 RX ADMIN — TRAMADOL HYDROCHLORIDE 50 MILLIGRAM(S): 50 TABLET ORAL at 13:39

## 2017-06-16 RX ADMIN — Medication 5 MILLIGRAM(S): at 05:07

## 2017-06-16 RX ADMIN — PANTOPRAZOLE SODIUM 40 MILLIGRAM(S): 20 TABLET, DELAYED RELEASE ORAL at 05:07

## 2017-06-16 RX ADMIN — Medication 100 MILLIGRAM(S): at 05:08

## 2017-06-16 RX ADMIN — GABAPENTIN 100 MILLIGRAM(S): 400 CAPSULE ORAL at 05:08

## 2017-06-16 RX ADMIN — Medication 1 TABLET(S): at 11:14

## 2017-06-16 RX ADMIN — TRAMADOL HYDROCHLORIDE 50 MILLIGRAM(S): 50 TABLET ORAL at 05:07

## 2017-06-16 RX ADMIN — GABAPENTIN 100 MILLIGRAM(S): 400 CAPSULE ORAL at 13:39

## 2017-06-16 RX ADMIN — Medication 81 MILLIGRAM(S): at 11:14

## 2017-06-16 RX ADMIN — LOSARTAN POTASSIUM 50 MILLIGRAM(S): 100 TABLET, FILM COATED ORAL at 05:08

## 2017-06-16 RX ADMIN — TRAMADOL HYDROCHLORIDE 50 MILLIGRAM(S): 50 TABLET ORAL at 06:04

## 2017-06-16 NOTE — PROGRESS NOTE ADULT - SUBJECTIVE AND OBJECTIVE BOX
[facility] - Dahiana Bañuelos, Toney Horner, Truman Falcon  Office Number:  [phone]    Patient resting comfortably in bed in NAD.  Laying flat with no respiratory distress.  No complaints of chest pain, dyspnea, palpitations, PND, or orthopnea.  Still with b/l leg pain.      MEDICATIONS  (STANDING):  aspirin enteric coated 81milliGRAM(s) Oral daily  losartan 50milliGRAM(s) Oral daily  gabapentin 100milliGRAM(s) Oral three times a day  atorvastatin 40milliGRAM(s) Oral at bedtime  pantoprazole    Tablet 40milliGRAM(s) Oral before breakfast  amLODIPine   Tablet 5milliGRAM(s) Oral at bedtime  metoprolol 100milliGRAM(s) Oral two times a day  multivitamin 1Tablet(s) Oral daily  oxybutynin 5milliGRAM(s) Oral two times a day  traMADol 50milliGRAM(s) Oral three times a day    MEDICATIONS  (PRN):  acetaminophen   Tablet 650milliGRAM(s) Oral every 6 hours PRN For Temp greater than 38.5 C (101.3 F)      Allergies    No Known Allergies        Vital Signs Last 24 Hrs  T(C): 37.1, Max: 37.1 (06-16 @ 13:53)  T(F): 98.8, Max: 98.8 (06-16 @ 13:53)  HR: 64 (62 - 66)  BP: 130/83 (128/75 - 145/83)  BP(mean): --  RR: 16 (15 - 16)  SpO2: 97% (95% - 97%)    I&O's Summary    I & Os for current day (as of 16 Jun 2017 14:12)  =============================================  IN: 860 ml / OUT: 200 ml / NET: 660 ml      ON EXAM:    Constitutional: NAD,  Eyes:  Pupils round, no lesions  ENMT: no exudate or erythema  Pulmonary: Non-labored, breath sounds are clear bilaterally, No wheezing, rales or rhonchi  Cardiovascular: PMI not palpable Regular S1 and S2, no murmurs, rubs, gallops or clicks; no distal palpable pulses  Gastrointestinal: Bowel Sounds present, soft, nontender.   Lymph:  No cervical lymphadenopathy.  Neurological: Alert, no focal deficits  Skin: No rashes. Changes of chronic venous stasis; small skin ulcer on the ventral aspect of the right second toe  Psych:  Mood & affect appropriate     LABS: All Labs Reviewed:                        10.4   6.1   )-----------( 158      ( 16 Jun 2017 07:44 )             32.4                         10.6   5.7   )-----------( 162      ( 15 Osmani 2017 08:01 )             33.4                         10.4   9.0   )-----------( 180      ( 14 Jun 2017 11:47 )             32.3     16 Jun 2017 07:44    143    |  109    |  30     ----------------------------<  90     4.1     |  29     |  1.30   15 Osmani 2017 08:01    142    |  107    |  26     ----------------------------<  80     4.0     |  30     |  1.10   14 Jun 2017 11:47    137    |  106    |  36     ----------------------------<  85     4.3     |  24     |  1.20     Ca    8.4        16 Jun 2017 07:44  Ca    8.4        15 Osmani 2017 08:01  Ca    8.8        14 Jun 2017 11:47    TPro  6.6    /  Alb  3.4    /  TBili  1.1    /  DBili  x      /  AST  17     /  ALT  15     /  AlkPhos  73     14 Jun 2017 11:47    PT/INR - ( 16 Jun 2017 07:44 )   PT: 23.7 sec;   INR: 2.14 ratio         PTT - ( 16 Jun 2017 07:44 )  PTT:48.5 sec          Assessment/Plan:  82 year-old woman with a history of hypercholesterolemia, hypertension, rheumatoid arthritis, cerebral aneurysm status post clipping, PAD status post femoropopliteal bypass with subsequent right leg femoropopliteal bypass thrombosis and infected wound, redo bypass with failure, then stents with failure, paroxysmal atrial fibrillation previously on AC with Eliquis which was converted to Coumadin after an episode of GI bleeding, preserved LV systolic function, rest pain in both legs, seen by Dr. Sánchez of vascular surgery felt not to be a candidate for further mechanical intervention.    -Coumadin for a goal INR of 2.0-3.0  -no evidence of volume overload  -vasc surgery followup  -cont asa  -cont arb, amlodipine  -cont bb  -cont statin  -will follow  -HR and BP controlled  -D/c planning per primary team  -To follow with Dr. Laughlin on discharge

## 2017-06-19 ENCOUNTER — APPOINTMENT (OUTPATIENT)
Dept: CARDIOLOGY | Facility: CLINIC | Age: 82
End: 2017-06-19

## 2017-06-19 LAB
CULTURE RESULTS: SIGNIFICANT CHANGE UP
CULTURE RESULTS: SIGNIFICANT CHANGE UP
SPECIMEN SOURCE: SIGNIFICANT CHANGE UP
SPECIMEN SOURCE: SIGNIFICANT CHANGE UP

## 2017-06-22 DIAGNOSIS — G62.9 POLYNEUROPATHY, UNSPECIFIED: ICD-10-CM

## 2017-06-22 DIAGNOSIS — I74.3 EMBOLISM AND THROMBOSIS OF ARTERIES OF THE LOWER EXTREMITIES: ICD-10-CM

## 2017-06-22 DIAGNOSIS — K21.9 GASTRO-ESOPHAGEAL REFLUX DISEASE WITHOUT ESOPHAGITIS: ICD-10-CM

## 2017-06-22 DIAGNOSIS — I48.2 CHRONIC ATRIAL FIBRILLATION: ICD-10-CM

## 2017-06-22 DIAGNOSIS — E78.5 HYPERLIPIDEMIA, UNSPECIFIED: ICD-10-CM

## 2017-06-22 DIAGNOSIS — I10 ESSENTIAL (PRIMARY) HYPERTENSION: ICD-10-CM

## 2017-06-22 DIAGNOSIS — M19.90 UNSPECIFIED OSTEOARTHRITIS, UNSPECIFIED SITE: ICD-10-CM

## 2017-06-22 DIAGNOSIS — Z79.01 LONG TERM (CURRENT) USE OF ANTICOAGULANTS: ICD-10-CM

## 2017-06-22 DIAGNOSIS — N32.81 OVERACTIVE BLADDER: ICD-10-CM

## 2017-06-22 DIAGNOSIS — Z86.73 PERSONAL HISTORY OF TRANSIENT ISCHEMIC ATTACK (TIA), AND CEREBRAL INFARCTION WITHOUT RESIDUAL DEFICITS: ICD-10-CM

## 2017-06-22 DIAGNOSIS — I73.9 PERIPHERAL VASCULAR DISEASE, UNSPECIFIED: ICD-10-CM

## 2017-06-22 LAB — INR PPP: 1.82

## 2017-06-29 LAB — INR PPP: 2.51

## 2017-07-13 LAB — INR PPP: 2.49

## 2017-08-04 LAB — INR PPP: 1.53

## 2017-08-18 ENCOUNTER — RESULT REVIEW (OUTPATIENT)
Age: 82
End: 2017-08-18

## 2017-08-18 LAB — INR PPP: 1.98

## 2017-08-31 LAB — INR PPP: 2.57

## 2017-09-07 ENCOUNTER — APPOINTMENT (OUTPATIENT)
Dept: CARDIOLOGY | Facility: CLINIC | Age: 82
End: 2017-09-07
Payer: MEDICARE

## 2017-09-07 VITALS — SYSTOLIC BLOOD PRESSURE: 138 MMHG | DIASTOLIC BLOOD PRESSURE: 65 MMHG | HEART RATE: 63 BPM

## 2017-09-07 VITALS — DIASTOLIC BLOOD PRESSURE: 75 MMHG | HEIGHT: 61 IN | HEART RATE: 63 BPM | SYSTOLIC BLOOD PRESSURE: 138 MMHG

## 2017-09-07 DIAGNOSIS — E78.00 PURE HYPERCHOLESTEROLEMIA, UNSPECIFIED: ICD-10-CM

## 2017-09-07 PROCEDURE — 93000 ELECTROCARDIOGRAM COMPLETE: CPT

## 2017-09-07 PROCEDURE — 99215 OFFICE O/P EST HI 40 MIN: CPT

## 2017-09-21 LAB — INR PPP: 2.07

## 2017-10-20 LAB — INR PPP: 3.16

## 2017-10-28 ENCOUNTER — INPATIENT (INPATIENT)
Facility: HOSPITAL | Age: 82
LOS: 17 days | Discharge: NURSING, REHAB OR LONG-TERM CARE | DRG: 580 | End: 2017-11-15
Attending: FAMILY MEDICINE | Admitting: FAMILY MEDICINE
Payer: MEDICARE

## 2017-10-28 VITALS
DIASTOLIC BLOOD PRESSURE: 76 MMHG | RESPIRATION RATE: 14 BRPM | OXYGEN SATURATION: 96 % | HEART RATE: 79 BPM | WEIGHT: 113.1 LBS | SYSTOLIC BLOOD PRESSURE: 116 MMHG | TEMPERATURE: 99 F

## 2017-10-28 DIAGNOSIS — D50.9 IRON DEFICIENCY ANEMIA, UNSPECIFIED: ICD-10-CM

## 2017-10-28 DIAGNOSIS — M86.9 OSTEOMYELITIS, UNSPECIFIED: ICD-10-CM

## 2017-10-28 DIAGNOSIS — Z29.9 ENCOUNTER FOR PROPHYLACTIC MEASURES, UNSPECIFIED: ICD-10-CM

## 2017-10-28 DIAGNOSIS — I63.9 CEREBRAL INFARCTION, UNSPECIFIED: ICD-10-CM

## 2017-10-28 DIAGNOSIS — E78.5 HYPERLIPIDEMIA, UNSPECIFIED: ICD-10-CM

## 2017-10-28 DIAGNOSIS — Z98.89 OTHER SPECIFIED POSTPROCEDURAL STATES: Chronic | ICD-10-CM

## 2017-10-28 DIAGNOSIS — Z96.7 PRESENCE OF OTHER BONE AND TENDON IMPLANTS: Chronic | ICD-10-CM

## 2017-10-28 DIAGNOSIS — I10 ESSENTIAL (PRIMARY) HYPERTENSION: ICD-10-CM

## 2017-10-28 DIAGNOSIS — Z98.49 CATARACT EXTRACTION STATUS, UNSPECIFIED EYE: Chronic | ICD-10-CM

## 2017-10-28 DIAGNOSIS — M06.9 RHEUMATOID ARTHRITIS, UNSPECIFIED: ICD-10-CM

## 2017-10-28 DIAGNOSIS — I48.91 UNSPECIFIED ATRIAL FIBRILLATION: ICD-10-CM

## 2017-10-28 DIAGNOSIS — N32.81 OVERACTIVE BLADDER: ICD-10-CM

## 2017-10-28 DIAGNOSIS — Z41.9 ENCOUNTER FOR PROCEDURE FOR PURPOSES OTHER THAN REMEDYING HEALTH STATE, UNSPECIFIED: Chronic | ICD-10-CM

## 2017-10-28 DIAGNOSIS — N20.0 CALCULUS OF KIDNEY: Chronic | ICD-10-CM

## 2017-10-28 DIAGNOSIS — K21.9 GASTRO-ESOPHAGEAL REFLUX DISEASE WITHOUT ESOPHAGITIS: ICD-10-CM

## 2017-10-28 DIAGNOSIS — L03.115 CELLULITIS OF RIGHT LOWER LIMB: ICD-10-CM

## 2017-10-28 DIAGNOSIS — D64.9 ANEMIA, UNSPECIFIED: ICD-10-CM

## 2017-10-28 LAB
ALBUMIN SERPL ELPH-MCNC: 3 G/DL — LOW (ref 3.3–5)
ALP SERPL-CCNC: 73 U/L — SIGNIFICANT CHANGE UP (ref 40–120)
ALT FLD-CCNC: 16 U/L — SIGNIFICANT CHANGE UP (ref 12–78)
ANION GAP SERPL CALC-SCNC: 10 MMOL/L — SIGNIFICANT CHANGE UP (ref 5–17)
APPEARANCE UR: ABNORMAL
APTT BLD: 45.6 SEC — HIGH (ref 27.5–37.4)
AST SERPL-CCNC: 18 U/L — SIGNIFICANT CHANGE UP (ref 15–37)
BASOPHILS # BLD AUTO: 0.1 K/UL — SIGNIFICANT CHANGE UP (ref 0–0.2)
BASOPHILS NFR BLD AUTO: 0.5 % — SIGNIFICANT CHANGE UP (ref 0–2)
BILIRUB SERPL-MCNC: 1.5 MG/DL — HIGH (ref 0.2–1.2)
BILIRUB UR-MCNC: NEGATIVE — SIGNIFICANT CHANGE UP
BUN SERPL-MCNC: 22 MG/DL — SIGNIFICANT CHANGE UP (ref 7–23)
CALCIUM SERPL-MCNC: 8.7 MG/DL — SIGNIFICANT CHANGE UP (ref 8.5–10.1)
CHLORIDE SERPL-SCNC: 106 MMOL/L — SIGNIFICANT CHANGE UP (ref 96–108)
CO2 SERPL-SCNC: 24 MMOL/L — SIGNIFICANT CHANGE UP (ref 22–31)
COLOR SPEC: YELLOW — SIGNIFICANT CHANGE UP
CREAT SERPL-MCNC: 1.2 MG/DL — SIGNIFICANT CHANGE UP (ref 0.5–1.3)
DIFF PNL FLD: ABNORMAL
EOSINOPHIL # BLD AUTO: 0 K/UL — SIGNIFICANT CHANGE UP (ref 0–0.5)
EOSINOPHIL NFR BLD AUTO: 0.2 % — SIGNIFICANT CHANGE UP (ref 0–6)
ERYTHROCYTE [SEDIMENTATION RATE] IN BLOOD: 119 MM/HR — HIGH (ref 0–20)
GLUCOSE SERPL-MCNC: 95 MG/DL — SIGNIFICANT CHANGE UP (ref 70–99)
GLUCOSE UR QL: NEGATIVE — SIGNIFICANT CHANGE UP
HCT VFR BLD CALC: 26.1 % — LOW (ref 34.5–45)
HGB BLD-MCNC: 8.2 G/DL — LOW (ref 11.5–15.5)
INR BLD: 2.26 RATIO — HIGH (ref 0.88–1.16)
KETONES UR-MCNC: NEGATIVE — SIGNIFICANT CHANGE UP
LACTATE SERPL-SCNC: 0.8 MMOL/L — SIGNIFICANT CHANGE UP (ref 0.7–2)
LEUKOCYTE ESTERASE UR-ACNC: ABNORMAL
LYMPHOCYTES # BLD AUTO: 0.7 K/UL — LOW (ref 1–3.3)
LYMPHOCYTES # BLD AUTO: 5.7 % — LOW (ref 13–44)
MCHC RBC-ENTMCNC: 29 PG — SIGNIFICANT CHANGE UP (ref 27–34)
MCHC RBC-ENTMCNC: 31.3 GM/DL — LOW (ref 32–36)
MCV RBC AUTO: 92.6 FL — SIGNIFICANT CHANGE UP (ref 80–100)
MONOCYTES # BLD AUTO: 0.7 K/UL — SIGNIFICANT CHANGE UP (ref 0–0.9)
MONOCYTES NFR BLD AUTO: 5.4 % — SIGNIFICANT CHANGE UP (ref 1–9)
NEUTROPHILS # BLD AUTO: 10.8 K/UL — HIGH (ref 1.8–7.4)
NEUTROPHILS NFR BLD AUTO: 88.2 % — HIGH (ref 43–77)
NITRITE UR-MCNC: POSITIVE
PH UR: 6 — SIGNIFICANT CHANGE UP (ref 5–8)
PLATELET # BLD AUTO: 176 K/UL — SIGNIFICANT CHANGE UP (ref 150–400)
POTASSIUM SERPL-MCNC: 3.7 MMOL/L — SIGNIFICANT CHANGE UP (ref 3.5–5.3)
POTASSIUM SERPL-SCNC: 3.7 MMOL/L — SIGNIFICANT CHANGE UP (ref 3.5–5.3)
PROCALCITONIN SERPL-MCNC: <0.05 NG/ML — HIGH (ref 0–0.04)
PROT SERPL-MCNC: 7 G/DL — SIGNIFICANT CHANGE UP (ref 6–8.3)
PROT UR-MCNC: 75 MG/DL
PROTHROM AB SERPL-ACNC: 25.1 SEC — HIGH (ref 9.8–12.7)
RBC # BLD: 2.81 M/UL — LOW (ref 3.8–5.2)
RBC # FLD: 14.6 % — HIGH (ref 10.3–14.5)
SODIUM SERPL-SCNC: 140 MMOL/L — SIGNIFICANT CHANGE UP (ref 135–145)
SP GR SPEC: 1 — LOW (ref 1.01–1.02)
UROBILINOGEN FLD QL: NEGATIVE — SIGNIFICANT CHANGE UP
WBC # BLD: 12.2 K/UL — HIGH (ref 3.8–10.5)
WBC # FLD AUTO: 12.2 K/UL — HIGH (ref 3.8–10.5)

## 2017-10-28 PROCEDURE — 99223 1ST HOSP IP/OBS HIGH 75: CPT | Mod: AI,GC

## 2017-10-28 PROCEDURE — 73660 X-RAY EXAM OF TOE(S): CPT | Mod: 26,RT

## 2017-10-28 PROCEDURE — 99285 EMERGENCY DEPT VISIT HI MDM: CPT

## 2017-10-28 PROCEDURE — 71010: CPT | Mod: 26

## 2017-10-28 PROCEDURE — 93010 ELECTROCARDIOGRAM REPORT: CPT

## 2017-10-28 RX ORDER — ACETAMINOPHEN 500 MG
650 TABLET ORAL EVERY 6 HOURS
Qty: 0 | Refills: 0 | Status: DISCONTINUED | OUTPATIENT
Start: 2017-10-28 | End: 2017-11-09

## 2017-10-28 RX ORDER — PIPERACILLIN AND TAZOBACTAM 4; .5 G/20ML; G/20ML
3.38 INJECTION, POWDER, LYOPHILIZED, FOR SOLUTION INTRAVENOUS ONCE
Qty: 0 | Refills: 0 | Status: COMPLETED | OUTPATIENT
Start: 2017-10-28 | End: 2017-10-28

## 2017-10-28 RX ORDER — PIPERACILLIN AND TAZOBACTAM 4; .5 G/20ML; G/20ML
3.38 INJECTION, POWDER, LYOPHILIZED, FOR SOLUTION INTRAVENOUS EVERY 8 HOURS
Qty: 0 | Refills: 0 | Status: DISCONTINUED | OUTPATIENT
Start: 2017-10-28 | End: 2017-11-09

## 2017-10-28 RX ORDER — PANTOPRAZOLE SODIUM 20 MG/1
40 TABLET, DELAYED RELEASE ORAL
Qty: 0 | Refills: 0 | Status: DISCONTINUED | OUTPATIENT
Start: 2017-10-28 | End: 2017-10-29

## 2017-10-28 RX ORDER — TRAMADOL HYDROCHLORIDE 50 MG/1
50 TABLET ORAL THREE TIMES A DAY
Qty: 0 | Refills: 0 | Status: DISCONTINUED | OUTPATIENT
Start: 2017-10-28 | End: 2017-10-28

## 2017-10-28 RX ORDER — VANCOMYCIN HCL 1 G
1000 VIAL (EA) INTRAVENOUS ONCE
Qty: 0 | Refills: 0 | Status: COMPLETED | OUTPATIENT
Start: 2017-10-28 | End: 2017-10-28

## 2017-10-28 RX ORDER — TRAMADOL HYDROCHLORIDE 50 MG/1
50 TABLET ORAL EVERY 8 HOURS
Qty: 0 | Refills: 0 | Status: DISCONTINUED | OUTPATIENT
Start: 2017-10-28 | End: 2017-11-06

## 2017-10-28 RX ORDER — SODIUM CHLORIDE 9 MG/ML
1000 INJECTION INTRAMUSCULAR; INTRAVENOUS; SUBCUTANEOUS ONCE
Qty: 0 | Refills: 0 | Status: COMPLETED | OUTPATIENT
Start: 2017-10-28 | End: 2017-10-28

## 2017-10-28 RX ORDER — ATORVASTATIN CALCIUM 80 MG/1
40 TABLET, FILM COATED ORAL AT BEDTIME
Qty: 0 | Refills: 0 | Status: DISCONTINUED | OUTPATIENT
Start: 2017-10-28 | End: 2017-11-09

## 2017-10-28 RX ORDER — SODIUM CHLORIDE 9 MG/ML
3 INJECTION INTRAMUSCULAR; INTRAVENOUS; SUBCUTANEOUS ONCE
Qty: 0 | Refills: 0 | Status: COMPLETED | OUTPATIENT
Start: 2017-10-28 | End: 2017-10-28

## 2017-10-28 RX ORDER — AMLODIPINE BESYLATE 2.5 MG/1
5 TABLET ORAL AT BEDTIME
Qty: 0 | Refills: 0 | Status: DISCONTINUED | OUTPATIENT
Start: 2017-10-28 | End: 2017-10-31

## 2017-10-28 RX ORDER — OXYCODONE AND ACETAMINOPHEN 5; 325 MG/1; MG/1
2 TABLET ORAL EVERY 6 HOURS
Qty: 0 | Refills: 0 | Status: DISCONTINUED | OUTPATIENT
Start: 2017-10-28 | End: 2017-11-07

## 2017-10-28 RX ORDER — GABAPENTIN 400 MG/1
100 CAPSULE ORAL THREE TIMES A DAY
Qty: 0 | Refills: 0 | Status: DISCONTINUED | OUTPATIENT
Start: 2017-10-28 | End: 2017-11-09

## 2017-10-28 RX ORDER — LOSARTAN POTASSIUM 100 MG/1
50 TABLET, FILM COATED ORAL DAILY
Qty: 0 | Refills: 0 | Status: DISCONTINUED | OUTPATIENT
Start: 2017-10-28 | End: 2017-11-04

## 2017-10-28 RX ORDER — VANCOMYCIN HCL 1 G
1000 VIAL (EA) INTRAVENOUS EVERY 24 HOURS
Qty: 0 | Refills: 0 | Status: DISCONTINUED | OUTPATIENT
Start: 2017-10-29 | End: 2017-10-30

## 2017-10-28 RX ORDER — ACETAMINOPHEN 500 MG
2 TABLET ORAL
Qty: 0 | Refills: 0 | COMMUNITY

## 2017-10-28 RX ORDER — METOPROLOL TARTRATE 50 MG
100 TABLET ORAL
Qty: 0 | Refills: 0 | Status: DISCONTINUED | OUTPATIENT
Start: 2017-10-28 | End: 2017-11-09

## 2017-10-28 RX ORDER — VANCOMYCIN HCL 1 G
1000 VIAL (EA) INTRAVENOUS EVERY 12 HOURS
Qty: 0 | Refills: 0 | Status: DISCONTINUED | OUTPATIENT
Start: 2017-10-28 | End: 2017-10-28

## 2017-10-28 RX ADMIN — GABAPENTIN 100 MILLIGRAM(S): 400 CAPSULE ORAL at 22:29

## 2017-10-28 RX ADMIN — OXYCODONE AND ACETAMINOPHEN 2 TABLET(S): 5; 325 TABLET ORAL at 22:29

## 2017-10-28 RX ADMIN — AMLODIPINE BESYLATE 5 MILLIGRAM(S): 2.5 TABLET ORAL at 22:29

## 2017-10-28 RX ADMIN — SODIUM CHLORIDE 2000 MILLILITER(S): 9 INJECTION INTRAMUSCULAR; INTRAVENOUS; SUBCUTANEOUS at 13:25

## 2017-10-28 RX ADMIN — ATORVASTATIN CALCIUM 40 MILLIGRAM(S): 80 TABLET, FILM COATED ORAL at 22:29

## 2017-10-28 RX ADMIN — TRAMADOL HYDROCHLORIDE 50 MILLIGRAM(S): 50 TABLET ORAL at 19:14

## 2017-10-28 RX ADMIN — Medication 250 MILLIGRAM(S): at 14:23

## 2017-10-28 RX ADMIN — OXYCODONE AND ACETAMINOPHEN 2 TABLET(S): 5; 325 TABLET ORAL at 23:29

## 2017-10-28 RX ADMIN — Medication 100 MILLIGRAM(S): at 19:14

## 2017-10-28 RX ADMIN — PIPERACILLIN AND TAZOBACTAM 25 GRAM(S): 4; .5 INJECTION, POWDER, LYOPHILIZED, FOR SOLUTION INTRAVENOUS at 22:29

## 2017-10-28 RX ADMIN — PIPERACILLIN AND TAZOBACTAM 200 GRAM(S): 4; .5 INJECTION, POWDER, LYOPHILIZED, FOR SOLUTION INTRAVENOUS at 13:36

## 2017-10-28 RX ADMIN — SODIUM CHLORIDE 3 MILLILITER(S): 9 INJECTION INTRAMUSCULAR; INTRAVENOUS; SUBCUTANEOUS at 13:41

## 2017-10-28 NOTE — H&P ADULT - NSHPREVIEWOFSYSTEMS_GEN_ALL_CORE
CONSTITUTIONAL: No weakness, fevers or chills  EYES/ENT: No visual changes;  No vertigo or throat pain   NECK: No pain or stiffness  RESPIRATORY: No cough, wheezing, hemoptysis; No shortness of breath  CARDIOVASCULAR: No chest pain or palpitations  GASTROINTESTINAL: No abdominal or epigastric pain. No nausea, vomiting, or hematemesis; No diarrhea or constipation. No melena or hematochezia.  GENITOURINARY: No dysuria, frequency or hematuria  NEUROLOGICAL: No numbness or weakness  SKIN: No itching, burning, rashes, or lesions   All other review of systems is negative unless indicated above. CONSTITUTIONAL: No weakness, fevers or chills  RESPIRATORY: No cough, wheezing, hemoptysis; No shortness of breath  CARDIOVASCULAR: No chest pain or palpitations  GASTROINTESTINAL: No abdominal or epigastric pain. No nausea, vomiting, or hematemesis; No diarrhea or constipation.   GENITOURINARY: No dysuria, frequency or hematuria  NEUROLOGICAL: No numbness or weakness  MSK: +joint pain in UE  SKIN: wound on right second toe   Extremities: Pain, warmth, redness, Edema of RLQ

## 2017-10-28 NOTE — H&P ADULT - ASSESSMENT
82F PMHx Afib on Coumadin, HLD, CVA, MVP, OA, HTN, PVD (s/p RLE bypassx3, most recently 2012), Neuropathy, GERD presents with pain and swelling to right second toe a/w osteomyelitis 82F PMHx Afib on Coumadin, HLD, CVA, MVP, OA, HTN, PVD (s/p RLE bypassx3, most recently 2012), Neuropathy, GERD presents with pain and swelling to right second toe a/w osteomyelitis. 82F PMHx Afib on Coumadin, HLD, CVA, MVP, OA, HTN, PVD (s/p RLE bypassx3, most recently 2012), Neuropathy, GERD presents with pain and swelling to right second toe a/w RLE cellulitis, r/o osteomyelitis.

## 2017-10-28 NOTE — H&P ADULT - ATTENDING COMMENTS
Will manage pt medically for cellulitis. Will wait to see how course of disease improves with antibiotics. It will be difficult to confirm osteomyelitis on this patient because she cannot have MRI and bone scan may yield a false positive. She may require a bone biopsy before further surgical intervention takes place.

## 2017-10-28 NOTE — H&P ADULT - HISTORY OF PRESENT ILLNESS
82F PMHx Afib on Coumadin, HLD, CVA, MVP, OA, HTN, PVD (s/p RLE bypassx3, most recently 2012), Neuropathy, GERD presents with 82F PMHx Afib on Coumadin, HLD, CVA, MVP, OA, HTN, PVD (s/p RLE bypassx3, most recently 2012), Neuropathy, GERD presents with pain and swelling to right second toe          In ED, pt's vital was wnl. Lab____________________. CXR showed no acute pathology. X ray of Rt foot showed possible small periarticular   erosion medial aspect head of the proximal phalanx which might represent infection/ osteomyelitis. Pt received 1 dose  fo       vanco and 1 dose of zosyn and 1 bolus of NS, BCX, UCX was sent in  ED. 82F PMHx Afib on Coumadin, HLD, CVA, MVP, OA, HTN, PVD (s/p RLE bypassx3, most recently 2012), Neuropathy, GERD presents with pain and swelling to right second toe that started 1 month ago. Patient states foot became red, painful, and a small wound with a scab started forming on the top portion of her second toe. It became extremely difficult to bear weight on the foot so she went to see Dr. Isaacs at the wound center and he drained the wound at which point, yellow pus drained from the wound. He prescribed her an antibiotic (she does not recall which one). She denies any fevers, chills, chesst pain or shortness of breat. Admits to joint pain due to her RA.     In ED, pt's vital was wnl. WBC 8.2, Hct: 26.1, lactate 0.8, INR: 2.26. CXR showed no acute pathology. X ray of Rt foot showed possible small periarticular   erosion medial aspect head of the proximal phalanx which might represent infection/ osteomyelitis. Pt received 1 dose  of  vanco and 1 dose of zosyn and 1 bolus of NS, BCX, UCX was sent in  ED. 82F PMHx Afib on Coumadin, HLD, CVA, MVP, OA, HTN, PVD (s/p RLE bypassx3, most recently 2012), Neuropathy, GERD presents with pain and swelling to right second toe that started 1 month ago. Patient states foot became red, painful, and a small wound with a scab started forming on the top portion of her second toe. It became extremely difficult to bear weight on the foot so she went to see Dr. Isaacs at the wound center and he drained the wound at which point, yellow pus drained from the wound. He prescribed her an antibiotic (she does not recall which one). She denies any fevers, chills, chesst pain or shortness of breat. Admits to joint pain due to her RA.     In ED, pt's vital was wnl. WBC 8.2, Hct: 26.1, lactate 0.8, , CMP revealed elevated bilirubin of 1.5, remainder is wnl , INR: 2.26. CXR showed no acute pathology. X ray of Rt foot showed possible small periarticular erosion medial aspect head of the proximal phalanx which might represent infection/ osteomyelitis. Pt received 1 dose  of  vanco and 1 dose of zosyn and 1 bolus of NS, BCX, UCX was sent in  ED.

## 2017-10-28 NOTE — H&P ADULT - NSHPPHYSICALEXAM_GEN_ALL_CORE
General: Well developed, well nourished, NAD  HEENT: NCAT, PERRLA, EOMI bl, moist mucous membranes   Neck: Supple, nontender, no mass  Neurology: A&Ox3, nonfocal, CN II-XII grossly intact, sensation intact, no gait abnormalities   Respiratory: CTA B/L, No W/R/R  CV: RRR, +S1/S2, no murmurs, rubs or gallops  Abdominal: Soft, NT, ND +BSx4  Extremities: No C/C/E, + peripheral pulses  MSK: Normal ROM, no joint erythema or warmth, no joint swelling   Skin: warm, dry, normal color, no rash or abnormal lesions General: ELDERLY, frail, pale female, in NAD  HEENT: NCAT, PERRLA,  moist mucous membranes   Neck: Supple, nontender, no mass  Neurology: A&Ox3, nonfocal, CN II-XII grossly intact, sensation intact  Respiratory: CTA B/L, No W/R/R  CV: RRR, +S1/S2, rubs or gallops  Abdominal: Soft, NT, ND +BSx4  Extremities: Necrotic wound with black eschar on second right toe, Edema and Erythema involving the right foot and leg distal to knee, poor right foot hygiene  MSK: Normal ROM,  no joint swelling   Skin: abnormal lesions on second right toe,  warm, dry, normal color, General: ELDERLY, frail, pale female, in NAD  HEENT: NCAT, PERRLA,  moist mucous membranes   Neck: Supple, nontender, no mass  Neurology: A&Ox3, nonfocal, CN II-XII grossly intact, sensation intact  Respiratory: CTA B/L, No W/R/R  CV: RRR, +S1/S2, rubs or gallops  Abdominal: Soft, NT, ND +BSx4  Extremities: Necrotic wound with black eschar on second right toe, Edema and Erythema involving the right foot and leg distal to knee, poor right foot hygiene, erythema marked with surgical pen.  MSK: Normal ROM,  no joint swelling   Skin: abnormal lesions on second right toe,  warm, dry, normal color,

## 2017-10-28 NOTE — H&P ADULT - PROBLEM SELECTOR PLAN 7
-chronic stable  -continue with omeprazole. -chronic stable  -continue with statin and ASA -chronic stable  -continue with gabapentin and tramadol

## 2017-10-28 NOTE — ED ADULT NURSE NOTE - OBJECTIVE STATEMENT
Present to ER with c/o of body aches and bilateral leg weakness. pt states he is being followed at wound center for second right toe redness/swelling. Pt has history of afib on coumadin, HLD, CVA, OA, MVP, HTN, RLE bypass, Neuropathy and GERD. Denies any fever or chills. Denies any nausea or vomiting.

## 2017-10-28 NOTE — H&P ADULT - PROBLEM SELECTOR PLAN 3
-chronic stable  -continue with losartan and amlodipine. -Hemoglobin is now 8.2, was above 10 in June  -Likely due to bleed vs iron deficiency  -Check Iron panel  -FOBT -continue with metoprolol  -hold coumadin for possible surgery for now  -INR: 2.26

## 2017-10-28 NOTE — H&P ADULT - PROBLEM SELECTOR PLAN 9
-chronic stable  -pt takes Vesicare at home   -will use Oxybutynin -chronic stable  -continue with omeprazole.

## 2017-10-28 NOTE — H&P ADULT - PROBLEM SELECTOR PLAN 4
-chronic stable   -continue with statin. -chronic stable  -continue with losartan and amlodipine. Likely secondary to chronic disease (RA)  Order iron studies

## 2017-10-28 NOTE — H&P ADULT - PROBLEM SELECTOR PLAN 2
-continue with metoprolol  -hold coumadin for possible surgery for now -continue with metoprolol  -hold coumadin for possible surgery for now  -INR: 2.26 -Obtain secondary scan (MRI or bone scan)  -Remainder of management is same as problem1 -Not a candidate for MRI due to neuro clips,  bone scan may yield false positive results due to ulcer, will treat medically at this time.  -Remainder of management is same as problem1 -Not a candidate for MRI due to neuro clips,  bone scan may yield false positive results due to ulcer, will treat medically at this time.

## 2017-10-28 NOTE — H&P ADULT - PROBLEM SELECTOR PLAN 8
-chronic stable  -pt takes Vesicare at home   -will use Oxybutynin -chronic stable  -continue with omeprazole. -chronic stable  -continue with statin and ASA

## 2017-10-28 NOTE — H&P ADULT - PROBLEM SELECTOR PLAN 5
-chronic stable  -continue with gabapentin and tramadol -chronic stable   -continue with statin. -chronic stable  -continue with losartan and amlodipine.

## 2017-10-28 NOTE — H&P ADULT - PROBLEM SELECTOR PLAN 6
-chronic stable  -continue with statin and ASA -chronic stable  -continue with gabapentin and tramadol -chronic stable   -continue with statin.

## 2017-10-28 NOTE — H&P ADULT - PROBLEM SELECTOR PLAN 1
-admit to [facility]  -continue with Vanco and zosyn  -consulted Podiatry -admit to [facility]  -continue with Vanco and zosyn  -Order MRI of right foot  -consulted Podiatry -admit to [facility]  -Pain control with ppercocet for severe, and tramadol for moderate pain  -continue with Vanco, start doxycycline  -consult Dr. Temple  -Consult ID: Dr. Headley -admit to [facility]  -Pain control with percocet for severe, and tramadol for moderate pain  -continue with Vanco, start doxycycline  -consult Dr. Temple  -Consult ID: Dr. Headley -admit to [facility]  -Pain control with percocet for severe, and tramadol for moderate pain  -continue with Vanco and zosyn.  -consult Dr. Temple  -Consult ID: Dr. Will

## 2017-10-29 DIAGNOSIS — D62 ACUTE POSTHEMORRHAGIC ANEMIA: ICD-10-CM

## 2017-10-29 DIAGNOSIS — E78.5 HYPERLIPIDEMIA, UNSPECIFIED: ICD-10-CM

## 2017-10-29 DIAGNOSIS — I73.9 PERIPHERAL VASCULAR DISEASE, UNSPECIFIED: ICD-10-CM

## 2017-10-29 DIAGNOSIS — M86.171 OTHER ACUTE OSTEOMYELITIS, RIGHT ANKLE AND FOOT: ICD-10-CM

## 2017-10-29 DIAGNOSIS — I48.2 CHRONIC ATRIAL FIBRILLATION: ICD-10-CM

## 2017-10-29 DIAGNOSIS — D64.9 ANEMIA, UNSPECIFIED: ICD-10-CM

## 2017-10-29 LAB
ANION GAP SERPL CALC-SCNC: 8 MMOL/L — SIGNIFICANT CHANGE UP (ref 5–17)
APTT BLD: 43.3 SEC — HIGH (ref 27.5–37.4)
BASOPHILS # BLD AUTO: 0.1 K/UL — SIGNIFICANT CHANGE UP (ref 0–0.2)
BASOPHILS NFR BLD AUTO: 0.7 % — SIGNIFICANT CHANGE UP (ref 0–2)
BUN SERPL-MCNC: 23 MG/DL — SIGNIFICANT CHANGE UP (ref 7–23)
CALCIUM SERPL-MCNC: 7.8 MG/DL — LOW (ref 8.5–10.1)
CHLORIDE SERPL-SCNC: 109 MMOL/L — HIGH (ref 96–108)
CO2 SERPL-SCNC: 25 MMOL/L — SIGNIFICANT CHANGE UP (ref 22–31)
CREAT SERPL-MCNC: 1.1 MG/DL — SIGNIFICANT CHANGE UP (ref 0.5–1.3)
CRP SERPL-MCNC: 10.5 MG/DL — HIGH (ref 0–0.4)
CULTURE RESULTS: NO GROWTH — SIGNIFICANT CHANGE UP
EOSINOPHIL # BLD AUTO: 0.2 K/UL — SIGNIFICANT CHANGE UP (ref 0–0.5)
EOSINOPHIL NFR BLD AUTO: 2.5 % — SIGNIFICANT CHANGE UP (ref 0–6)
FERRITIN SERPL-MCNC: 236 NG/ML — HIGH (ref 15–150)
GLUCOSE SERPL-MCNC: 93 MG/DL — SIGNIFICANT CHANGE UP (ref 70–99)
HBA1C BLD-MCNC: 5.5 % — SIGNIFICANT CHANGE UP (ref 4–5.6)
HCT VFR BLD CALC: 23.4 % — LOW (ref 34.5–45)
HCT VFR BLD CALC: 27.2 % — LOW (ref 34.5–45)
HGB BLD-MCNC: 7.2 G/DL — LOW (ref 11.5–15.5)
HGB BLD-MCNC: 8.6 G/DL — LOW (ref 11.5–15.5)
INR BLD: 2.69 RATIO — HIGH (ref 0.88–1.16)
IRON SATN MFR SERPL: 13 UG/DL — LOW (ref 30–160)
IRON SATN MFR SERPL: 9 % — LOW (ref 14–50)
LYMPHOCYTES # BLD AUTO: 1.2 K/UL — SIGNIFICANT CHANGE UP (ref 1–3.3)
LYMPHOCYTES # BLD AUTO: 15.2 % — SIGNIFICANT CHANGE UP (ref 13–44)
MCHC RBC-ENTMCNC: 29 PG — SIGNIFICANT CHANGE UP (ref 27–34)
MCHC RBC-ENTMCNC: 29.5 PG — SIGNIFICANT CHANGE UP (ref 27–34)
MCHC RBC-ENTMCNC: 30.8 GM/DL — LOW (ref 32–36)
MCHC RBC-ENTMCNC: 31.5 GM/DL — LOW (ref 32–36)
MCV RBC AUTO: 93.6 FL — SIGNIFICANT CHANGE UP (ref 80–100)
MCV RBC AUTO: 94.1 FL — SIGNIFICANT CHANGE UP (ref 80–100)
MONOCYTES # BLD AUTO: 0.6 K/UL — SIGNIFICANT CHANGE UP (ref 0–0.9)
MONOCYTES NFR BLD AUTO: 8.1 % — SIGNIFICANT CHANGE UP (ref 1–9)
NEUTROPHILS # BLD AUTO: 5.8 K/UL — SIGNIFICANT CHANGE UP (ref 1.8–7.4)
NEUTROPHILS NFR BLD AUTO: 73.6 % — SIGNIFICANT CHANGE UP (ref 43–77)
PLATELET # BLD AUTO: 165 K/UL — SIGNIFICANT CHANGE UP (ref 150–400)
PLATELET # BLD AUTO: 178 K/UL — SIGNIFICANT CHANGE UP (ref 150–400)
POTASSIUM SERPL-MCNC: 3.6 MMOL/L — SIGNIFICANT CHANGE UP (ref 3.5–5.3)
POTASSIUM SERPL-SCNC: 3.6 MMOL/L — SIGNIFICANT CHANGE UP (ref 3.5–5.3)
PROTHROM AB SERPL-ACNC: 29.9 SEC — HIGH (ref 9.8–12.7)
RBC # BLD: 2.48 M/UL — LOW (ref 3.8–5.2)
RBC # BLD: 2.91 M/UL — LOW (ref 3.8–5.2)
RBC # FLD: 14.3 % — SIGNIFICANT CHANGE UP (ref 10.3–14.5)
RBC # FLD: 14.4 % — SIGNIFICANT CHANGE UP (ref 10.3–14.5)
SODIUM SERPL-SCNC: 142 MMOL/L — SIGNIFICANT CHANGE UP (ref 135–145)
SPECIMEN SOURCE: SIGNIFICANT CHANGE UP
TIBC SERPL-MCNC: 142 UG/DL — LOW (ref 220–430)
UIBC SERPL-MCNC: 129 UG/DL — SIGNIFICANT CHANGE UP (ref 110–370)
WBC # BLD: 7.9 K/UL — SIGNIFICANT CHANGE UP (ref 3.8–10.5)
WBC # BLD: 8 K/UL — SIGNIFICANT CHANGE UP (ref 3.8–10.5)
WBC # FLD AUTO: 7.9 K/UL — SIGNIFICANT CHANGE UP (ref 3.8–10.5)
WBC # FLD AUTO: 8 K/UL — SIGNIFICANT CHANGE UP (ref 3.8–10.5)

## 2017-10-29 PROCEDURE — 99233 SBSQ HOSP IP/OBS HIGH 50: CPT

## 2017-10-29 RX ORDER — PANTOPRAZOLE SODIUM 20 MG/1
40 TABLET, DELAYED RELEASE ORAL EVERY 12 HOURS
Qty: 0 | Refills: 0 | Status: DISCONTINUED | OUTPATIENT
Start: 2017-10-29 | End: 2017-11-09

## 2017-10-29 RX ADMIN — PANTOPRAZOLE SODIUM 40 MILLIGRAM(S): 20 TABLET, DELAYED RELEASE ORAL at 17:48

## 2017-10-29 RX ADMIN — PANTOPRAZOLE SODIUM 40 MILLIGRAM(S): 20 TABLET, DELAYED RELEASE ORAL at 05:32

## 2017-10-29 RX ADMIN — OXYCODONE AND ACETAMINOPHEN 2 TABLET(S): 5; 325 TABLET ORAL at 06:35

## 2017-10-29 RX ADMIN — PIPERACILLIN AND TAZOBACTAM 25 GRAM(S): 4; .5 INJECTION, POWDER, LYOPHILIZED, FOR SOLUTION INTRAVENOUS at 15:34

## 2017-10-29 RX ADMIN — OXYCODONE AND ACETAMINOPHEN 2 TABLET(S): 5; 325 TABLET ORAL at 07:29

## 2017-10-29 RX ADMIN — GABAPENTIN 100 MILLIGRAM(S): 400 CAPSULE ORAL at 05:32

## 2017-10-29 RX ADMIN — OXYCODONE AND ACETAMINOPHEN 2 TABLET(S): 5; 325 TABLET ORAL at 21:21

## 2017-10-29 RX ADMIN — PIPERACILLIN AND TAZOBACTAM 25 GRAM(S): 4; .5 INJECTION, POWDER, LYOPHILIZED, FOR SOLUTION INTRAVENOUS at 05:32

## 2017-10-29 RX ADMIN — GABAPENTIN 100 MILLIGRAM(S): 400 CAPSULE ORAL at 21:40

## 2017-10-29 RX ADMIN — TRAMADOL HYDROCHLORIDE 50 MILLIGRAM(S): 50 TABLET ORAL at 12:00

## 2017-10-29 RX ADMIN — AMLODIPINE BESYLATE 5 MILLIGRAM(S): 2.5 TABLET ORAL at 21:40

## 2017-10-29 RX ADMIN — GABAPENTIN 100 MILLIGRAM(S): 400 CAPSULE ORAL at 14:16

## 2017-10-29 RX ADMIN — Medication 1 TABLET(S): at 11:01

## 2017-10-29 RX ADMIN — TRAMADOL HYDROCHLORIDE 50 MILLIGRAM(S): 50 TABLET ORAL at 11:01

## 2017-10-29 RX ADMIN — OXYCODONE AND ACETAMINOPHEN 2 TABLET(S): 5; 325 TABLET ORAL at 20:21

## 2017-10-29 RX ADMIN — PIPERACILLIN AND TAZOBACTAM 25 GRAM(S): 4; .5 INJECTION, POWDER, LYOPHILIZED, FOR SOLUTION INTRAVENOUS at 23:29

## 2017-10-29 RX ADMIN — OXYCODONE AND ACETAMINOPHEN 2 TABLET(S): 5; 325 TABLET ORAL at 14:58

## 2017-10-29 RX ADMIN — ATORVASTATIN CALCIUM 40 MILLIGRAM(S): 80 TABLET, FILM COATED ORAL at 21:40

## 2017-10-29 RX ADMIN — OXYCODONE AND ACETAMINOPHEN 2 TABLET(S): 5; 325 TABLET ORAL at 14:03

## 2017-10-29 RX ADMIN — Medication 250 MILLIGRAM(S): at 14:08

## 2017-10-29 NOTE — CONSULT NOTE ADULT - PROBLEM SELECTOR RECOMMENDATION 9
no overt s/s of GI bleeding  iron studies with mixed etiology -- iron def + chronic disease  f/u FOBT  monitor h/h, to be transfused 1 unit prbc  may need EGD/colonoscopy  hold coumadin no overt s/s of GI bleeding  iron studies with mixed etiology -- iron def + chronic disease  f/u FOBT  monitor h/h, to be transfused 1 unit prbc  protonix 40 mg bid  may need EGD/colonoscopy  hold coumadin

## 2017-10-29 NOTE — PROVIDER CONTACT NOTE (OTHER) - REASON
Should Zosyn be hung before unit of blood Should Zosyn be hung before unit of blood. Blood bank stated blood was ready

## 2017-10-29 NOTE — PROVIDER CONTACT NOTE (OTHER) - SITUATION
Upon assessing patient right foot wound, foot looked more discolored and swollen than yesterday upon admission.

## 2017-10-29 NOTE — PROGRESS NOTE ADULT - PROBLEM SELECTOR PLAN 2
continue vanco/zosyn for now  podiatry eval  inflammatory markers suggestive of OM  clinical picture suggestive of OM and without ability to undergo MRI due to intracranial surgical clipping from 1980's  requested vascular surgery eval as well (Princess)  ordered arterial doppler of RLE to further evaluate patency of stents  no palpable pulses but appears well perfused and pain in toe is proportionate to examination at this time

## 2017-10-29 NOTE — PROVIDER CONTACT NOTE (OTHER) - ACTION/TREATMENT ORDERED:
Ok per Dr Parra to azalia nguyen. Ok per Dr Parra to hang Zosyn first and the unit of blood after. Ok per Dr Parra to hang Zosyn first and the unit of blood after. Hung Zosyn at 15:34 after Vanco dose

## 2017-10-29 NOTE — PROGRESS NOTE ADULT - PROBLEM SELECTOR PLAN 1
admit to general medical floor  upgraded to remote tele today  GI consult appreciated  requested cardio eval as well  serial H/H, transfuse 1 unit pRBC's today, type+screen, consent signed  check FOBT  iron studies w/ mixed results  I discussed the plan w/ pt at length and she is in agreement at this time  hold all AC  ICD's for DVT ppx   no indication for FFP or warfarin reversal at this time but will closely monitor  INR therapeutic

## 2017-10-29 NOTE — CHART NOTE - NSCHARTNOTEFT_GEN_A_CORE
Called by RN because she noticed patient's right 2nd toe appeared to be "blue", however when we arrived to evaluate patient RN states the color returned to normal when the patient moved from chair to bed. Patient seen and examined lying comfortably in bed c/o right 2nd toe pain, which has been unchanged since admission. She complained of warmth and tenderness to palpation of the right 2nd toe. Otherwise denied fevers, chills, numbness/tingling.     T(C): 36.3 (10-29-17 @ 20:18), Max: 36.7 (10-29-17 @ 17:11)  HR: 90 (10-29-17 @ 20:18) (68 - 90)  BP: 110/60 (10-29-17 @ 20:18) (97/58 - 110/60)  RR: 16 (10-29-17 @ 20:18) (16 - 16)  SpO2: 96% (10-29-17 @ 20:18) (93% - 96%)    GENERAL: patient appears well, no acute distress, appropriate, pleasant  HEAD: NC/AT  INTEGUMENT: necrotic appearing ulcer on dorsal surface of right 2nd toe, tender to palpation, mildly erythematous. Sensation intact, poor pedal pulse of Right 2nd foot   MUSCULOSKELETAL: no clubbing or cyanosis, no obvious deformity  NEUROLOGIC: awake, alert, oriented x3, good muscle tone in 4 extremities, no obvious sensory deficits    A/P: 82F PMHx Afib on Coumadin, HLD, CVA, MVP, OA, HTN, PVD (s/p RLE bypassx3, most recently 2012), Neuropathy, GERD presents with pain and swelling to right second toe a/w RLE cellulitis, r/o osteomyelitis. Found to have symptomatic anemia from acute blood loss, suspect GI source  -Osteomyelitis, patient is clinically stable.   -Vascular surgeon to see patient in AM   -Podiatry to see patient in AM  -Continue Vanco and Zosyn   -Tylenol for mild pain, tramadol for moderate pain, Oxycodone for severe pain ordered   -Management discussed with Dr. Goldberg (PGY-2)

## 2017-10-29 NOTE — PROGRESS NOTE ADULT - SUBJECTIVE AND OBJECTIVE BOX
pt admitted w/ R toe ulcer. Pt has h/o gastric ulcer and admits compliance w/ PPI as outpt. Denies epigastric discomfort. Denies reflux/heartburn. Denies other complaints at this time.     Found to have symptomatic anemia with acute drop from prior baseline. Podiatry/ID were consulted in the ED. I will consult Cardio ([facility]) and GI ([facility]) and vascular ([facility]). Ordered 1 unit pRBC's, state type/screen. Will obtain consent now. F/u CBC after transfusion. Check FOBT. Hold warfarin as she's therapeutic. I don't see obvious indication for FFP or reversal of warfarin as pt is hemodynamically stable at this time. Added remote telemetry while on the unit. Pt feels slightly tired and admits pain in R foot but no other acute complaints. pt admitted w/ R toe ulcer. Pt has h/o gastric ulcer and admits compliance w/ PPI as outpt. Denies epigastric discomfort. Denies reflux/heartburn. Denies other complaints at this time.     Found to have symptomatic anemia with acute drop from prior baseline. Podiatry/ID were consulted in the ED. I will consult Cardio ([facility]) and GI ([facility]) and vascular ([facility]incess). Ordered 1 unit pRBC's, state type/screen. Will obtain consent now. F/u CBC after transfusion. Check FOBT. Hold warfarin as she's therapeutic. I don't see obvious indication for FFP or reversal of warfarin as pt is hemodynamically stable at this time. Added remote telemetry while on the unit. Pt feels slightly tired and admits pain in R foot but no other acute complaints.       c/c: R toe ulcer    HPI/Hospital course: 82F PMHx Afib on Coumadin, HLD, CVA, MVP, OA, HTN, PVD (s/p RLE bypass x3, most recently 2012), Neuropathy, GERD presents with pain and swelling to right second toe that started 1 month ago. Patient states foot became red, painful, and a small wound with a scab started forming on the top portion of her second toe. It became extremely difficult to bear weight on the foot so she went to see Dr. Isaacs at the wound center and he drained the wound at which point, yellow pus drained from the wound. He prescribed her an antibiotic (she does not recall which one). She denies any fevers, chills, chesst pain or shortness of breath.    There were no acute overnight events. The pt came to the floor early this morning. She was given 1L NS bolus on the ED. Hgb this morning demonstrated significant drop to 7.2 from her baseline which appears to be between 9-10. She feels slightly fatigued but describes this as mild. I discussed her GI evaluation hx with her at length and she states that she had EGD/colon within the year (she thinks this was done here at [facility] but there are no records to support this available at this time). She was told that she had gastric ulcer which was not actively bleeding but they recommended PPI which she has been compliant with. She had no reflux/heartburn/halitosis at that time and still denies these symptoms. She admits occasional dark colored stools, denies gross red blood, denies epigastric discomfort. She denies other complaints except R toe pain at the affected site of the ulcer. Symptoms well controlled w/ pain regimen listed. No fever or chills.    ROS:  CONSTITUTIONAL: denies fever, chills, fatigue, weakness  HEENT: denies blurred vision, sore throat  SKIN: denies new lesions, rash  CARDIOVASCULAR: denies chest pain, chest pressure, palpitations  RESPIRATORY: denies shortness of breath, sputum production  GASTROINTESTINAL: denies nausea, vomiting, diarrhea, abdominal pain  GENITOURINARY: denies dysuria, discharge  NEUROLOGICAL: denies numbness, headache, focal weakness  MUSCULOSKELETAL: denies new joint pain, muscle aches  HEMATOLOGIC: denies gross bleeding, bruising  LYMPHATICS: denies enlarged lymph nodes, extremity swelling  PSYCHIATRIC: denies recent changes in anxiety, depression  ENDOCRINOLOGIC: denies sweating, cold or heat intolerance    Medications:  MEDICATIONS  (STANDING):  amLODIPine   Tablet 5 milliGRAM(s) Oral at bedtime  atorvastatin 40 milliGRAM(s) Oral at bedtime  gabapentin 100 milliGRAM(s) Oral three times a day  losartan 50 milliGRAM(s) Oral daily  metoprolol     tartrate 100 milliGRAM(s) Oral two times a day  multivitamin 1 Tablet(s) Oral daily  pantoprazole  Injectable 40 milliGRAM(s) IV Push every 12 hours  piperacillin/tazobactam IVPB. 3.375 Gram(s) IV Intermittent every 8 hours  vancomycin  IVPB 1000 milliGRAM(s) IV Intermittent every 24 hours    MEDICATIONS  (PRN):  acetaminophen   Tablet. 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  Osteoarthritis  Afib  PVD (peripheral vascular disease)  Neuropathy: (Right lower leg)  H/O cerebral aneurysm repair: brain clips  CVA (cerebral vascular accident): (&quot;Mini-stroke&quot;,1990&#x27;s)  Rheumatoid arthritis  Hyperlipidemia  Hypertension  S/P cataract surgery: (Left eye)  Elective surgery: (&quot;Twisted bowel&quot;, 2014)  Elective surgery: (Exision of cyst on liver, 1985)  S/P ORIF (open reduction internal fixation) fracture: Left hip fx (2012) &amp; R hip fx (2013)  H/O cerebral aneurysm repair: Brain clips (1978(  Renal stone: Cysto stent placement 10/1/2014  PVD (peripheral vascular disease): s/p RLE bypass x 3, most recent 3/2012 Right external iliac to PT bypass w/ PTFE (2012)  S/P RFED (total abdominal hysterectomy): (1987, Hx of &quot;ovarian cancer?&quot;)    Labs:                        7.2    7.9   )-----------( 165      ( 29 Oct 2017 06:45 )             23.4   10-29    142  |  109<H>  |  23  ----------------------------<  93  3.6   |  25  |  1.10    Ca    7.8<L>      29 Oct 2017 06:45    TPro  7.0  /  Alb  3.0<L>  /  TBili  1.5<H>  /  DBili  x   /  AST  18  /  ALT  16  /  AlkPhos  73  10-28    Imaging:  arterial doppler pending    PE:  T(C): 36.7 (10-29-17 @ 17:11), Max: 37.1 (10-28-17 @ 19:13)  HR: 80 (10-29-17 @ 17:11) (68 - 92)  BP: 97/60 (10-29-17 @ 17:11) (97/58 - 125/68)  RR: 16 (10-29-17 @ 17:11) (16 - 16)  SpO2: 93% (10-29-17 @ 17:11) (93% - 100%)    GENERAL: patient appears pale but comfortable, no acute distress  EYES: sclera clear, no exudates  ENMT: oropharynx clear without erythema, no exudates, moist mucous membranes  NECK: supple, soft, no thyromegaly noted  LUNGS: good air entry bilaterally, clear to auscultation, symmetric breath sounds, no wheezing or rhonchi appreciated  HEART: soft systolic murmur in R 2nd ICS 2/6, irregular rhythm  GASTROINTESTINAL: abdomen is soft, no epigastric TTP, nondistended, normoactive bowel sounds, no palpable masses  INTEGUMENT: good skin turgor, no lesions noted  MUSCULOSKELETAL: no clubbing or cyanosis, no obvious deformity  NEUROLOGIC: awake, alert, oriented x3, good muscle tone in 4 extremities, no obvious sensory deficits  PSYCHIATRIC: mood is good, affect is congruent, linear and logical thought process  HEME/LYMPH: no palpable supraclavicular nodules, no obvious ecchymosis or petechiae c/c: R toe ulcer    HPI/Hospital course: 82F PMHx Afib on Coumadin, HLD, CVA, MVP, OA, HTN, PVD (s/p RLE bypass x3, most recently 2012), Neuropathy, GERD presents with pain and swelling to right second toe that started 1 month ago. Patient states foot became red, painful, and a small wound with a scab started forming on the top portion of her second toe. It became extremely difficult to bear weight on the foot so she went to see Dr. Isaacs at the wound center and he drained the wound at which point, yellow pus drained from the wound. He prescribed her an antibiotic (she does not recall which one). She denies any fevers, chills, chesst pain or shortness of breath.    There were no acute overnight events. The pt came to the floor early this morning. She was given 1L NS bolus on the ED. Hgb this morning demonstrated significant drop to 7.2 from her baseline which appears to be between 9-10. She feels slightly fatigued but describes this as mild. I discussed her GI evaluation hx with her at length and she states that she had EGD/colon within the year (she thinks this was done here at [facility] but there are no records to support this available at this time). She was told that she had gastric ulcer which was not actively bleeding but they recommended PPI which she has been compliant with. She had no reflux/heartburn/halitosis at that time and still denies these symptoms. She admits occasional dark colored stools, denies gross red blood, denies epigastric discomfort. She denies other complaints except R toe pain at the affected site of the ulcer. Symptoms well controlled w/ pain regimen listed. No fever or chills.    ROS:  CONSTITUTIONAL: denies fever, chills, fatigue, weakness  HEENT: denies blurred vision, sore throat  SKIN: denies new lesions, rash  CARDIOVASCULAR: denies chest pain, chest pressure, palpitations  RESPIRATORY: denies shortness of breath, sputum production  GASTROINTESTINAL: denies nausea, vomiting, diarrhea, abdominal pain  GENITOURINARY: denies dysuria, discharge  NEUROLOGICAL: denies numbness, headache, focal weakness  MUSCULOSKELETAL: denies new joint pain, muscle aches  HEMATOLOGIC: denies gross bleeding, bruising  LYMPHATICS: denies enlarged lymph nodes, extremity swelling  PSYCHIATRIC: denies recent changes in anxiety, depression  ENDOCRINOLOGIC: denies sweating, cold or heat intolerance    Medications:  MEDICATIONS  (STANDING):  amLODIPine   Tablet 5 milliGRAM(s) Oral at bedtime  atorvastatin 40 milliGRAM(s) Oral at bedtime  gabapentin 100 milliGRAM(s) Oral three times a day  losartan 50 milliGRAM(s) Oral daily  metoprolol     tartrate 100 milliGRAM(s) Oral two times a day  multivitamin 1 Tablet(s) Oral daily  pantoprazole  Injectable 40 milliGRAM(s) IV Push every 12 hours  piperacillin/tazobactam IVPB. 3.375 Gram(s) IV Intermittent every 8 hours  vancomycin  IVPB 1000 milliGRAM(s) IV Intermittent every 24 hours    MEDICATIONS  (PRN):  acetaminophen   Tablet. 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  Osteoarthritis  Afib  PVD (peripheral vascular disease)  Neuropathy: (Right lower leg)  H/O cerebral aneurysm repair: brain clips  CVA (cerebral vascular accident): (&quot;Mini-stroke&quot;,1990&#x27;s)  Rheumatoid arthritis  Hyperlipidemia  Hypertension  S/P cataract surgery: (Left eye)  Elective surgery: (&quot;Twisted bowel&quot;, 2014)  Elective surgery: (Exision of cyst on liver, 1985)  S/P ORIF (open reduction internal fixation) fracture: Left hip fx (2012) &amp; R hip fx (2013)  H/O cerebral aneurysm repair: Brain clips (1978(  Renal stone: Cysto stent placement 10/1/2014  PVD (peripheral vascular disease): s/p RLE bypass x 3, most recent 3/2012 Right external iliac to PT bypass w/ PTFE (2012)  S/P FRED (total abdominal hysterectomy): (1987, Hx of &quot;ovarian cancer?&quot;)    Labs:                        7.2    7.9   )-----------( 165      ( 29 Oct 2017 06:45 )             23.4   10-29    142  |  109<H>  |  23  ----------------------------<  93  3.6   |  25  |  1.10    Ca    7.8<L>      29 Oct 2017 06:45    TPro  7.0  /  Alb  3.0<L>  /  TBili  1.5<H>  /  DBili  x   /  AST  18  /  ALT  16  /  AlkPhos  73  10-28    Imaging:  arterial doppler pending    PE:  T(C): 36.7 (10-29-17 @ 17:11), Max: 37.1 (10-28-17 @ 19:13)  HR: 80 (10-29-17 @ 17:11) (68 - 92)  BP: 97/60 (10-29-17 @ 17:11) (97/58 - 125/68)  RR: 16 (10-29-17 @ 17:11) (16 - 16)  SpO2: 93% (10-29-17 @ 17:11) (93% - 100%)    GENERAL: patient appears pale but comfortable, no acute distress  EYES: sclera clear, no exudates  ENMT: oropharynx clear without erythema, no exudates, moist mucous membranes  NECK: supple, soft, no thyromegaly noted  LUNGS: good air entry bilaterally, clear to auscultation, symmetric breath sounds, no wheezing or rhonchi appreciated  HEART: soft systolic murmur in R 2nd ICS 2/6, irregular rhythm  GASTROINTESTINAL: abdomen is soft, no epigastric TTP, nondistended, normoactive bowel sounds, no palpable masses  INTEGUMENT: lesion noted on R 2nd toe, blackened and appears necrotic. erythema extending proximally up foot to distal shin/calf, improved from prior markings w/ surgical pen  MUSCULOSKELETAL: no clubbing or cyanosis, no obvious deformity  NEUROLOGIC: awake, alert, oriented x3, good muscle tone in 4 extremities, no obvious sensory deficits  PSYCHIATRIC: mood is good, affect is congruent, linear and logical thought process  HEME/LYMPH: no palpable supraclavicular nodules, no obvious ecchymosis or petechiae

## 2017-10-29 NOTE — PROVIDER CONTACT NOTE (OTHER) - ACTION/TREATMENT ORDERED:
MD aware. Dr. Goldberg and Dr. Canseco assessed pt. Keep right leg elevated and continue pain regimen. No new interventions made at this time. Consults made as per MD ordered.

## 2017-10-29 NOTE — PROGRESS NOTE ADULT - ATTENDING COMMENTS
The admission plan was discussed with the patient in detail. The patient's questions and concerns were addressed to the best of my ability. The patient is in agreement with the plan detailed above. The patient demonstrated adequate understanding of the plan and the counseling which I have provided.    She wants her PCP to be kept informed. I informed the pt that I will d/w PCP during regular office hours as I don't have any additional information to offer at this time. She agrees as this time.

## 2017-10-29 NOTE — CONSULT NOTE ADULT - SUBJECTIVE AND OBJECTIVE BOX
Chief Complaint:  Patient is a 82y old  Female who presents with a chief complaint of weakness and pain both legs (28 Oct 2017 20:23)      HPI: 82F PMHx Afib on Coumadin, HLD, CVA, MVP, OA, HTN, PVD (s/p RLE bypassx3, most recently ), Neuropathy, GERD presents with pain and swelling to right second toe that started 1 month ago. Patient states foot became red, painful, and a small wound with a scab started forming on the top portion of her second toe. It became extremely difficult to bear weight on the foot so she went to see Dr. Isaacs at the wound center and he drained the wound at which point, yellow pus drained from the wound. He prescribed her an antibiotic (she does not recall which one). She denies any fevers, chills, chesst pain or shortness of breat. Admits to joint pain due to her RA.     In ED, pt's vital was wnl. WBC 8.2, Hct: 26.1, lactate 0.8, , CMP revealed elevated bilirubin of 1.5, remainder is wnl , INR: 2.26. CXR showed no acute pathology. X ray of Rt foot showed possible small periarticular erosion medial aspect head of the proximal phalanx which might represent infection/ osteomyelitis. Pt received 1 dose  of  vanco and 1 dose of zosyn and 1 bolus of NS, BCX, UCX was sent in  ED.      Allergies:  No Known Allergies      Medications:  acetaminophen   Tablet. 650 milliGRAM(s) Oral every 6 hours PRN  amLODIPine   Tablet 5 milliGRAM(s) Oral at bedtime  atorvastatin 40 milliGRAM(s) Oral at bedtime  gabapentin 100 milliGRAM(s) Oral three times a day  losartan 50 milliGRAM(s) Oral daily  metoprolol     tartrate 100 milliGRAM(s) Oral two times a day  multivitamin 1 Tablet(s) Oral daily  oxyCODONE    5 mG/acetaminophen 325 mG 2 Tablet(s) Oral every 6 hours PRN  pantoprazole    Tablet 40 milliGRAM(s) Oral before breakfast  piperacillin/tazobactam IVPB. 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  Osteoarthritis  Afib  PVD (peripheral vascular disease)  Neuropathy  Gout  H/O cerebral aneurysm repair  CVA (cerebral vascular accident)  Rheumatoid arthritis  Asthma  Hyperlipidemia  Hypertension  S/P cataract surgery  Elective surgery  Elective surgery  S/P ORIF (open reduction internal fixation) fracture  H/O cerebral aneurysm repair  Renal stone  PVD (peripheral vascular disease)  S/P FRED (total abdominal hysterectomy)      Family history:  Family history of aneurysm      Social History:     ROS:     General:  No wt loss, fevers, chills, night sweats, fatigue,   Eyes:  Good vision, no reported pain  ENT:  No sore throat, pain, runny nose, dysphagia  CV:  No pain, palpitations, hypo/hypertension  Resp:  No dyspnea, cough, tachypnea, wheezing  GI:  No pain, No nausea, No vomiting, No diarrhea, No constipation, No weight loss, No fever, No pruritis, No rectal bleeding, No tarry stools, No dysphagia,  :  No pain, bleeding, incontinence, nocturia  Muscle:  right foot pain  Neuro:  No weakness, tingling, memory problems  Psych:  No fatigue, insomnia, mood problems, depression  Endocrine:  No polyuria, polydipsia, cold/heat intolerance  Heme:  No petechiae, ecchymosis, easy bruisability  Skin:  No rash, tattoos, scars, edema      PHYSICAL EXAM:   Vital Signs:  Vital Signs Last 24 Hrs  T(C): 36.7 (29 Oct 2017 17:11), Max: 37.1 (28 Oct 2017 17:43)  T(F): 98.1 (29 Oct 2017 17:11), Max: 98.8 (28 Oct 2017 19:13)  HR: 80 (29 Oct 2017 17:11) (68 - 92)  BP: 97/60 (29 Oct 2017 17:11) (97/58 - 125/68)  BP(mean): --  RR: 16 (29 Oct 2017 17:11) (15 - 16)  SpO2: 93% (29 Oct 2017 17:11) (93% - 100%)  Daily     Daily Weight in k.4 (28 Oct 2017 20:29)    GENERAL:  Appears stated age, well-groomed, well-nourished, no distress  HEENT:  NC/AT,  conjunctivae clear and pink, no thyromegaly, nodules, adenopathy, no JVD, sclera -anicteric  CHEST:  Full & symmetric excursion, no increased effort, breath sounds clear  HEART:  Regular rhythm, S1, S2, no murmur/rub/S3/S4, no abdominal bruit, no edema  ABDOMEN:  Soft, non-tender, non-distended, normoactive bowel sounds,  no masses ,no hepato-splenomegaly, no signs of chronic liver disease  EXTEREMITIES:  no cyanosis,clubbing or edema  SKIN:  No rash/erythema/ecchymoses/petechiae/wounds/abscess/warm/dry  NEURO:  Alert, oriented, no asterixis, no tremor, no encephalopathy    LABS:                        7.2    7.9   )-----------( 165      ( 29 Oct 2017 06:45 )             23.4     10    142  |  109<H>  |  23  ----------------------------<  93  3.6   |  25  |  1.10    Ca    7.8<L>      29 Oct 2017 06:45    TPro  7.0  /  Alb  3.0<L>  /  TBili  1.5<H>  /  DBili  x   /  AST  18  /  ALT  16  /  AlkPhos  73  10-    LIVER FUNCTIONS - ( 28 Oct 2017 13:40 )  Alb: 3.0 g/dL / Pro: 7.0 g/dL / ALK PHOS: 73 U/L / ALT: 16 U/L / AST: 18 U/L / GGT: x           PT/INR - ( 29 Oct 2017 06:45 )   PT: 29.9 sec;   INR: 2.69 ratio         PTT - ( 29 Oct 2017 06:45 )  PTT:43.3 sec  Urinalysis Basic - ( 28 Oct 2017 19:19 )    Color: Yellow / Appearance: Slightly Turbid / S.005 / pH: x  Gluc: x / Ketone: Negative  / Bili: Negative / Urobili: Negative   Blood: x / Protein: 75 mg/dL / Nitrite: Positive   Leuk Esterase: Moderate / RBC: x / WBC 6-10   Sq Epi: x / Non Sq Epi: Few / Bacteria: Few          Imaging:

## 2017-10-30 LAB
ANION GAP SERPL CALC-SCNC: 6 MMOL/L — SIGNIFICANT CHANGE UP (ref 5–17)
BUN SERPL-MCNC: 20 MG/DL — SIGNIFICANT CHANGE UP (ref 7–23)
CALCIUM SERPL-MCNC: 7.6 MG/DL — LOW (ref 8.5–10.1)
CHLORIDE SERPL-SCNC: 108 MMOL/L — SIGNIFICANT CHANGE UP (ref 96–108)
CO2 SERPL-SCNC: 26 MMOL/L — SIGNIFICANT CHANGE UP (ref 22–31)
CREAT SERPL-MCNC: 1.2 MG/DL — SIGNIFICANT CHANGE UP (ref 0.5–1.3)
GLUCOSE SERPL-MCNC: 92 MG/DL — SIGNIFICANT CHANGE UP (ref 70–99)
HCT VFR BLD CALC: 26.6 % — LOW (ref 34.5–45)
HCT VFR BLD CALC: 29.9 % — LOW (ref 34.5–45)
HGB BLD-MCNC: 8.3 G/DL — LOW (ref 11.5–15.5)
HGB BLD-MCNC: 9.1 G/DL — LOW (ref 11.5–15.5)
MCHC RBC-ENTMCNC: 29.3 PG — SIGNIFICANT CHANGE UP (ref 27–34)
MCHC RBC-ENTMCNC: 31.2 GM/DL — LOW (ref 32–36)
MCV RBC AUTO: 93.9 FL — SIGNIFICANT CHANGE UP (ref 80–100)
OB PNL STL: NEGATIVE — SIGNIFICANT CHANGE UP
PLATELET # BLD AUTO: 175 K/UL — SIGNIFICANT CHANGE UP (ref 150–400)
POTASSIUM SERPL-MCNC: 3.6 MMOL/L — SIGNIFICANT CHANGE UP (ref 3.5–5.3)
POTASSIUM SERPL-SCNC: 3.6 MMOL/L — SIGNIFICANT CHANGE UP (ref 3.5–5.3)
RBC # BLD: 2.83 M/UL — LOW (ref 3.8–5.2)
RBC # FLD: 14.3 % — SIGNIFICANT CHANGE UP (ref 10.3–14.5)
SODIUM SERPL-SCNC: 140 MMOL/L — SIGNIFICANT CHANGE UP (ref 135–145)
WBC # BLD: 7.8 K/UL — SIGNIFICANT CHANGE UP (ref 3.8–10.5)
WBC # FLD AUTO: 7.8 K/UL — SIGNIFICANT CHANGE UP (ref 3.8–10.5)

## 2017-10-30 PROCEDURE — 74174 CTA ABD&PLVS W/CONTRAST: CPT | Mod: 26

## 2017-10-30 PROCEDURE — 73706 CT ANGIO LWR EXTR W/O&W/DYE: CPT | Mod: 26,50,59

## 2017-10-30 PROCEDURE — 99223 1ST HOSP IP/OBS HIGH 75: CPT

## 2017-10-30 PROCEDURE — 99233 SBSQ HOSP IP/OBS HIGH 50: CPT | Mod: GC

## 2017-10-30 RX ORDER — SOD SULF/SODIUM/NAHCO3/KCL/PEG
1000 SOLUTION, RECONSTITUTED, ORAL ORAL EVERY 4 HOURS
Qty: 0 | Refills: 0 | Status: COMPLETED | OUTPATIENT
Start: 2017-10-30 | End: 2017-10-30

## 2017-10-30 RX ORDER — LIDOCAINE 4 G/100G
1 CREAM TOPICAL
Qty: 0 | Refills: 0 | Status: DISCONTINUED | OUTPATIENT
Start: 2017-10-30 | End: 2017-11-09

## 2017-10-30 RX ORDER — VANCOMYCIN HCL 1 G
1250 VIAL (EA) INTRAVENOUS EVERY 24 HOURS
Qty: 0 | Refills: 0 | Status: DISCONTINUED | OUTPATIENT
Start: 2017-10-31 | End: 2017-11-07

## 2017-10-30 RX ADMIN — Medication 1000 MILLILITER(S): at 23:27

## 2017-10-30 RX ADMIN — Medication 250 MILLIGRAM(S): at 13:47

## 2017-10-30 RX ADMIN — PIPERACILLIN AND TAZOBACTAM 25 GRAM(S): 4; .5 INJECTION, POWDER, LYOPHILIZED, FOR SOLUTION INTRAVENOUS at 21:49

## 2017-10-30 RX ADMIN — LOSARTAN POTASSIUM 50 MILLIGRAM(S): 100 TABLET, FILM COATED ORAL at 06:02

## 2017-10-30 RX ADMIN — ATORVASTATIN CALCIUM 40 MILLIGRAM(S): 80 TABLET, FILM COATED ORAL at 21:40

## 2017-10-30 RX ADMIN — PIPERACILLIN AND TAZOBACTAM 25 GRAM(S): 4; .5 INJECTION, POWDER, LYOPHILIZED, FOR SOLUTION INTRAVENOUS at 06:02

## 2017-10-30 RX ADMIN — GABAPENTIN 100 MILLIGRAM(S): 400 CAPSULE ORAL at 06:01

## 2017-10-30 RX ADMIN — Medication 1 TABLET(S): at 12:32

## 2017-10-30 RX ADMIN — Medication 1000 MILLILITER(S): at 17:53

## 2017-10-30 RX ADMIN — PANTOPRAZOLE SODIUM 40 MILLIGRAM(S): 20 TABLET, DELAYED RELEASE ORAL at 17:29

## 2017-10-30 RX ADMIN — PIPERACILLIN AND TAZOBACTAM 25 GRAM(S): 4; .5 INJECTION, POWDER, LYOPHILIZED, FOR SOLUTION INTRAVENOUS at 13:05

## 2017-10-30 RX ADMIN — OXYCODONE AND ACETAMINOPHEN 2 TABLET(S): 5; 325 TABLET ORAL at 11:15

## 2017-10-30 RX ADMIN — GABAPENTIN 100 MILLIGRAM(S): 400 CAPSULE ORAL at 13:06

## 2017-10-30 RX ADMIN — OXYCODONE AND ACETAMINOPHEN 2 TABLET(S): 5; 325 TABLET ORAL at 10:28

## 2017-10-30 RX ADMIN — AMLODIPINE BESYLATE 5 MILLIGRAM(S): 2.5 TABLET ORAL at 21:40

## 2017-10-30 RX ADMIN — GABAPENTIN 100 MILLIGRAM(S): 400 CAPSULE ORAL at 21:40

## 2017-10-30 RX ADMIN — Medication 100 MILLIGRAM(S): at 06:01

## 2017-10-30 RX ADMIN — PANTOPRAZOLE SODIUM 40 MILLIGRAM(S): 20 TABLET, DELAYED RELEASE ORAL at 06:02

## 2017-10-30 RX ADMIN — Medication 20 MILLIGRAM(S): at 21:40

## 2017-10-30 RX ADMIN — OXYCODONE AND ACETAMINOPHEN 2 TABLET(S): 5; 325 TABLET ORAL at 02:55

## 2017-10-30 RX ADMIN — OXYCODONE AND ACETAMINOPHEN 2 TABLET(S): 5; 325 TABLET ORAL at 03:55

## 2017-10-30 NOTE — CONSULT NOTE ADULT - ASSESSMENT
82 y old F with Right 2nd digit cellulitis     Plan: 82 y old F with Right 2nd digit cellulitis     Plan:         WOUND CARE ORDER AND INSTRUCTIONS:    Wound dressings to be removed and new dressings to be re-applied on Monday,Wednesday,Friday schedule  Patient to return to [facility], for continued evaluation, within 1 week upon discharge from [facility]    1. Remove dressing to affected foot and cleanse surgical site/wound with sterile saline.  2. Apply aquacel to wound.  3. Apply sterile 4x4 gauze and ABD pads to the  wound, secure with 4" pearl and medical tape.  4. Monitor for infections. 82 y old F with Right 2nd digit cellulitis and osteomyelitis    Plan:     Pt seen and examined with attending today   reviewed RFXr, shows possible osteomyelitis of Right 2nd digit. R MRI not obtained since pt has history of brain clips.   Discussed surgical management with pt 10/31/17 for Right 2nd digit amp with met head resection. Pt agrees.   Will call pt's daughter to inform.   Please obtain medical and cardiac and vascular clearance for surgery 10/31/17.   Pt to be NPO After midnight tonight.   Recmd IV Abx as per ID   Rinsed R foot wound with NS, applied acticoat, DSD to right 2nd digit wound.     WOUND CARE ORDER AND INSTRUCTIONS:    Wound dressings to be removed and new dressings to be re-applied on Monday,Wednesday,Friday schedule  Patient to return to [facility]'s [facility], for continued evaluation, within 1 week upon discharge from [facility]    1. Remove dressing to affected foot and cleanse surgical site/wound with sterile saline.  2. Apply aquacel to wound.  3. Apply sterile 4x4 gauze and ABD pads to the  wound, secure with 4" pearl and medical tape.  4. Monitor for infections.

## 2017-10-30 NOTE — CONSULT NOTE ADULT - SUBJECTIVE AND OBJECTIVE BOX
Patient is a 82y old  Female who presents with a chief complaint of weakness and pain both legs (28 Oct 2017 20:23)      HPI:  82F PMHx Afib on Coumadin, HLD, CVA, MVP, OA, HTN, PVD (s/p RLE bypassx3, most recently 2012), Neuropathy, GERD presents with pain and swelling to right second toe that started 1 month prior to admission. Patient states foot became red, painful, and a small wound with a scab started forming on the top portion of her second toe. It became extremely difficult to bear weight on the foot so she went to see Dr. Isaacs at the wound center and he drained the wound at which point, yellow pus drained from the wound. He prescribed her an antibiotic but ultimately required inpatient admission for further evaluation and management.   Of note she has been seen by our group in 2015 for GI bleed and anemia at that time. Additionally she was seen by our group in June 2017 for worsening vascular disease of the lower extremities. She has remained on anticoagulation since.   Noted now to have progressive anemia again and asked for further evaluation.    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  Osteoarthritis  Afib  PVD (peripheral vascular disease)  Neuropathy: (Right lower leg)  H/O cerebral aneurysm repair: brain clips  CVA (cerebral vascular accident): (&quot;Mini-stroke&quot;,1990&#x27;s)  Rheumatoid arthritis  Hyperlipidemia  Hypertension  S/P cataract surgery: (Left eye)  Elective surgery: (&quot;Twisted bowel&quot;, 2014)  Elective surgery: (Exision of cyst on liver, 1985)  S/P ORIF (open reduction internal fixation) fracture: Left hip fx (2012) &amp; R hip fx (2013)  H/O cerebral aneurysm repair: Brain clips (1978(  Renal stone: Cysto stent placement 10/1/2014  PVD (peripheral vascular disease): s/p RLE bypass x 3, most recent 3/2012 Right external iliac to PT bypass w/ PTFE (2012)  S/P FRED (total abdominal hysterectomy): (1987, Hx of &quot;ovarian cancer?&quot;)      SOCIAL HISTORY:  Lives at home; full code  denies current ETOH or tobacco use    FAMILY HISTORY:  Family history of aneurysm      MEDICATIONS  (STANDING):  amLODIPine   Tablet 5 milliGRAM(s) Oral at bedtime  atorvastatin 40 milliGRAM(s) Oral at bedtime  gabapentin 100 milliGRAM(s) Oral three times a day  losartan 50 milliGRAM(s) Oral daily  metoprolol     tartrate 100 milliGRAM(s) Oral two times a day  multivitamin 1 Tablet(s) Oral daily  pantoprazole  Injectable 40 milliGRAM(s) IV Push every 12 hours  piperacillin/tazobactam IVPB. 3.375 Gram(s) IV Intermittent every 8 hours  vancomycin  IVPB 1000 milliGRAM(s) IV Intermittent every 24 hours    MEDICATIONS  (PRN):  acetaminophen   Tablet. 650 milliGRAM(s) Oral every 6 hours PRN Mild Pain (1 - 3)  lidocaine 5% Ointment 1 Application(s) Topical two times a day PRN neuropathic pain  oxyCODONE    5 mG/acetaminophen 325 mG 2 Tablet(s) Oral every 6 hours PRN Severe Pain (7 - 10)  traMADol 50 milliGRAM(s) Oral every 8 hours PRN Moderate Pain (4 - 6)      Allergies    No Known Allergies    Vital Signs Last 24 Hrs  T(C): 37.3 (30 Oct 2017 13:17), Max: 37.3 (30 Oct 2017 13:17)  T(F): 99.2 (30 Oct 2017 13:17), Max: 99.2 (30 Oct 2017 13:17)  HR: 72 (30 Oct 2017 13:17) (72 - 92)  BP: 100/59 (30 Oct 2017 13:17) (97/60 - 124/76)  BP(mean): --  RR: 17 (30 Oct 2017 13:17) (15 - 17)  SpO2: 92% (30 Oct 2017 13:17) (92% - 96%)    PHYSICAL EXAM  General: adult in NAD  HEENT: clear oropharynx, anicteric sclera, pink conjunctivae  Neck: supple  CV: normal S1S2 with no murmur rubs or gallops  Lungs: clear to auscultation, no wheezes, no rhales  Abdomen: soft non-tender non-distended, no hepato/splenomegaly  Ext: + Bandages on the Right foot C/D/I  Skin: no rashes and no petichiae  Neuro: alert and cooperative    LABS:    CBC Full  -  ( 30 Oct 2017 07:19 )  WBC Count : 7.8 K/uL  Hemoglobin : 8.3 g/dL  Hematocrit : 26.6 %  Platelet Count - Automated : 175 K/uL  Mean Cell Volume : 93.9 fl  Mean Cell Hemoglobin : 29.3 pg  Mean Cell Hemoglobin Concentration : 31.2 gm/dL    Hemoglobin: 8.3 g/dL (10-30 @ 07:19)  Hemoglobin: 8.6 g/dL (10-29 @ 23:53)  Hemoglobin: 7.2 g/dL (10-29 @ 06:45)  Hemoglobin: 8.2 g/dL (10-28 @ 13:40)      10-30    140  |  108  |  20  ----------------------------<  92  3.6   |  26  |  1.20    Ca    7.6<L>      30 Oct 2017 07:19      PT/INR - ( 29 Oct 2017 06:45 )   PT: 29.9 sec;   INR: 2.69 ratio    PTT - ( 29 Oct 2017 06:45 )  PTT:43.3 sec    Iron with Total Binding Capacity (10.29.17 @ 11:05)    % Saturation, Iron: 9 %    Iron - Total Binding Capacity.: 142 ug/dL    Iron Total, Serum: 13 ug/dL    Unsaturated Iron Binding Capacity: 129 ug/dL    Ferritin, Serum 10.29.17 @ 11:05)    Ferritin, Serum: 236.0 ng/mL      Protein Electrophoresis, Serum (08.27.15 @ 11:28)    Serum Protein Electrophoresis Interp: Hypogammaglobulinemia    Folate, Serum (08.27.15 @ 11:28)    Folate, Serum: 15.8 ng/mL    Vitamin B12, Serum (08.30.15 @ 15:25)    Vitamin B12, Serum: 364 pg/mL      BLOOD SMEAR INTERPRETATION:  * RBC - normocytic, normochromic, no anisiocytosis or poikiolocytosis; rare elliptocyte  * WBC - neutrophils increased granules and dohle bodies;  small mature lymphs, no evidence of left shift; mild increase in eosinophils  * Platelets - normal in number and morphology

## 2017-10-30 NOTE — CONSULT NOTE ADULT - SUBJECTIVE AND OBJECTIVE BOX
[facility]         Dahiana Bañuelos, Evens, Toney, Terrie, Truman, Filiberto        [phone] (office)    CHIEF COMPLAINT: Patient is a 82y old  Female who presents with a chief complaint of weakness and pain both legs (28 Oct 2017 20:23)    HPI:  82F PMHx Afib on Coumadin, HLD, CVA, MVP, OA, HTN, PVD (s/p RLE bypassx3, most recently 2012), Neuropathy, GERD presents with pain and swelling to right second toe that started 1 month ago. Patient states foot became red, painful, and a small wound with a scab started forming on the top portion of her second toe. It became extremely difficult to bear weight on the foot so she went to see Dr. Isaacs at the wound center and he drained the wound at which point, yellow pus drained from the wound. He prescribed her an antibiotic (she does not recall which one). She denies any fevers, chills, chesst pain or shortness of breat. Admits to joint pain due to her RA.     In ED, pt's vital was wnl. WBC 8.2, Hct: 26.1, lactate 0.8, , CMP revealed elevated bilirubin of 1.5, remainder is wnl , INR: 2.26. CXR showed no acute pathology. X ray of Rt foot showed possible small periarticular erosion medial aspect head of the proximal phalanx which might represent infection/ osteomyelitis. Pt received 1 dose  of  vanco and 1 dose of zosyn and 1 bolus of NS, BCX, UCX was sent in  ED. (28 Oct 2017 13:42)    Intern history: Patient seen and examined at bedside. Events noted. Reports burning sensation in R foot. Denies chest pain, SOB, dyspnea, orthopnea, palpitations.     Telemetry: NSR 78, no events overnight.      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  Osteoarthritis  Afib  PVD (peripheral vascular disease)  Neuropathy: (Right lower leg)  H/O cerebral aneurysm repair: brain clips  CVA (cerebral vascular accident): (&quot;Mini-stroke&quot;,1990&#x27;s)  Rheumatoid arthritis  Hyperlipidemia  Hypertension  S/P cataract surgery: (Left eye)  Elective surgery: (&quot;Twisted bowel&quot;, 2014)  Elective surgery: (Exision of cyst on liver, 1985)  S/P ORIF (open reduction internal fixation) fracture: Left hip fx (2012) &amp; R hip fx (2013)  H/O cerebral aneurysm repair: Brain clips (1978(  Renal stone: Cysto stent placement 10/1/2014  PVD (peripheral vascular disease): s/p RLE bypass x 3, most recent 3/2012 Right external iliac to PT bypass w/ PTFE (2012)  S/P FRED (total abdominal hysterectomy): (1987, Hx of &quot;ovarian cancer?&quot;)      SOCIAL HISTORY: No active tobacco, alcohol or illicit drug use    FAMILY HISTORY:  Family history of aneurysm      Outpatient medications:    MEDICATIONS  (STANDING):  amLODIPine   Tablet 5 milliGRAM(s) Oral at bedtime  atorvastatin 40 milliGRAM(s) Oral at bedtime  gabapentin 100 milliGRAM(s) Oral three times a day  losartan 50 milliGRAM(s) Oral daily  metoprolol     tartrate 100 milliGRAM(s) Oral two times a day  multivitamin 1 Tablet(s) Oral daily  pantoprazole  Injectable 40 milliGRAM(s) IV Push every 12 hours  piperacillin/tazobactam IVPB. 3.375 Gram(s) IV Intermittent every 8 hours  vancomycin  IVPB 1000 milliGRAM(s) IV Intermittent every 24 hours    MEDICATIONS  (PRN):  acetaminophen   Tablet. 650 milliGRAM(s) Oral every 6 hours PRN Mild Pain (1 - 3)  lidocaine 5% Ointment 1 Application(s) Topical two times a day PRN neuropathic pain  oxyCODONE    5 mG/acetaminophen 325 mG 2 Tablet(s) Oral every 6 hours PRN Severe Pain (7 - 10)  traMADol 50 milliGRAM(s) Oral every 8 hours PRN Moderate Pain (4 - 6)      Allergies    No Known Allergies    Intolerances    REVIEW OF SYSTEMS: Is negative for eye, ENT, GI, , allergic, dermatologic, musculoskeletal and neurologic, except as described above.    VITAL SIGNS:   Vital Signs Last 24 Hrs  T(C): 37.1 (30 Oct 2017 05:21), Max: 37.1 (30 Oct 2017 05:21)  T(F): 98.7 (30 Oct 2017 05:21), Max: 98.7 (30 Oct 2017 05:21)  HR: 92 (30 Oct 2017 05:21) (72 - 92)  BP: 110/65 (30 Oct 2017 05:21) (97/60 - 124/76)  BP(mean): --  RR: 17 (30 Oct 2017 05:21) (15 - 17)  SpO2: 95% (30 Oct 2017 05:43) (93% - 96%)    I&O's Summary    29 Oct 2017 07:01  -  30 Oct 2017 07:00  --------------------------------------------------------  IN: 635 mL / OUT: 0 mL / NET: 635 mL        PHYSICAL EXAM:    Constitutional: elderly female in NAD  Pulmonary: Non-labored, breath sounds are clear bilaterally, no wheezing, rales or rhonchi  Cardiovascular:  regular S1 and S2. No murmur.  No rubs, gallops or clicks  Gastrointestinal: Bowel Sounds present, soft, nontender.   Lymph: No peripheral edema.   Neurological: Alert, strength and sensitivity are grossly intact  Skin: R foot wrapped, good peripheral pulses  Psych:  Mood & affect appropriate .    LABS: All Labs Reviewed:                        8.3    7.8   )-----------( 175      ( 30 Oct 2017 07:19 )             26.6                         8.6    8.0   )-----------( 178      ( 29 Oct 2017 23:53 )             27.2                         7.2    7.9   )-----------( 165      ( 29 Oct 2017 06:45 )             23.4     30 Oct 2017 07:19    140    |  108    |  20     ----------------------------<  92     3.6     |  26     |  1.20   29 Oct 2017 06:45    142    |  109    |  23     ----------------------------<  93     3.6     |  25     |  1.10   28 Oct 2017 13:40    140    |  106    |  22     ----------------------------<  95     3.7     |  24     |  1.20     Ca    7.6        30 Oct 2017 07:19  Ca    7.8        29 Oct 2017 06:45  Ca    8.7        28 Oct 2017 13:40    TPro  7.0    /  Alb  3.0    /  TBili  1.5    /  DBili  x      /  AST  18     /  ALT  16     /  AlkPhos  73     28 Oct 2017 13:40    PT/INR - ( 29 Oct 2017 06:45 )   PT: 29.9 sec;   INR: 2.69 ratio         PTT - ( 29 Oct 2017 06:45 )  PTT:43.3 sec      Blood Culture: Organism --  Gram Stain Blood -- Gram Stain --  Specimen Source .Urine Clean Catch (Midstream)  Culture-Blood --    Organism --  Gram Stain Blood -- Gram Stain --  Specimen Source .Blood Blood-Peripheral  Culture-Blood --        EKG: NSR at 81, left axis deviation

## 2017-10-30 NOTE — PROGRESS NOTE ADULT - ASSESSMENT
82F PMHx Afib on Coumadin, HLD, CVA, MVP, OA, HTN, PVD (s/p RLE bypassx3, most recently 2012), Neuropathy, GERD presents with pain and swelling to right second toe a/w RLE cellulitis, r/o osteomyelitis. Found to have symptomatic anemia from acute blood loss, suspect GI source

## 2017-10-30 NOTE — PROGRESS NOTE ADULT - PROBLEM SELECTOR PLAN 1
(+) iron def anemia  patient agreeable for endoscopic work up  egd/colonsocopy tomorrow  bowel prep ordered  npo past midnight  protonix 40 mg daily

## 2017-10-30 NOTE — PROGRESS NOTE ADULT - PROBLEM SELECTOR PLAN 7
chronic. Currently controlled  continue with metoprolol, amlodipine, losartan with parameters  continue to monitor BP  DASH diet - npo after midnight

## 2017-10-30 NOTE — PROGRESS NOTE ADULT - PROBLEM SELECTOR PLAN 3
erythema is improved from markings from surgical pen at time of admission  continue vancomycin and zosyn  f/u vanc trough

## 2017-10-30 NOTE — PROGRESS NOTE ADULT - SUBJECTIVE AND OBJECTIVE BOX
Patient is a 82y old  Female who presents with a chief complaint of weakness and pain both legs found to have low hemoglobin     Interval HPI:    Overnight, patient desaturated to 87% on room air. Patient was placed on 2 L NC with improvement to 95%. Patient was seen today morning at the bedside. She states that she still has pain the the b/l lower feet with right> left. She Also admits to pain at the 2nd toe on the right. She denies any fever, chest pain, sob, blood in the stool, diarrhea, constipation, dysuria or hematuria.    INTERVAL HPI/OVERNIGHT EVENTS:  T(C): 37.3 (10-30-17 @ 13:17), Max: 37.3 (10-30-17 @ 13:17)  HR: 72 (10-30-17 @ :17) (72 - 92)  BP: 100/59 (10-30-17 @ 13:17) (97/60 - 124/76)  RR: 17 (10-30-17 @ 13:17) (15 - 17)  SpO2: 92% (10-30-17 @ 13:17) (92% - 96%)  Wt(kg): --  I&O's Summary    29 Oct 2017 07:  -  30 Oct 2017 07:00  --------------------------------------------------------  IN: 635 mL / OUT: 0 mL / NET: 635 mL    30 Oct 2017 07:  -  30 Oct 2017 14:10  --------------------------------------------------------  IN: 240 mL / OUT: 0 mL / NET: 240 mL        REVIEW OF SYSTEMS:  CONSTITUTIONAL: denies fever, chills, fatigue, weakness  HEENT: denies blurred vision, sore throat  SKIN: denies new lesions, rash  CARDIOVASCULAR: denies chest pain, chest pressure, palpitations  RESPIRATORY: denies shortness of breath, sputum production  GASTROINTESTINAL: denies nausea, vomiting, diarrhea, abdominal pain, dyschezia  GENITOURINARY: denies dysuria, discharge  NEUROLOGICAL: denies numbness, headache, focal weakness  MUSCULOSKELETAL: denies new joint pain, muscle aches      PHYSICAL EXAM:  GENERAL: NAD,   HEAD:  Atraumatic, Normocephalic  EYES: EOMI, fixed dilated pupil on the left  NERVOUS SYSTEM:  Alert & Oriented X3, poor concentration; Motor Strength 5/5 B/L upper and lower extremities;   CHEST/LUNG: Clear to percussion bilaterally; No rales, rhonchi, wheezing, or rubs  HEART: Regular rate and rhythm; No murmurs, rubs, or gallops  ABDOMEN: Soft, Nontender, Nondistended; Bowel sounds present  EXTREMITIES:  trace Peripheral Pulses in bilateral lower extremities, b/l feet are warm, tender to palpation on the right foot at the toe    SKIN: right foot, 2nd toe lesion blackened with necrotic appearance, trace erythema distal to surgical pen marking,     MEDICATIONS  (STANDING):  amLODIPine   Tablet 5 milliGRAM(s) Oral at bedtime  atorvastatin 40 milliGRAM(s) Oral at bedtime  gabapentin 100 milliGRAM(s) Oral three times a day  losartan 50 milliGRAM(s) Oral daily  metoprolol     tartrate 100 milliGRAM(s) Oral two times a day  multivitamin 1 Tablet(s) Oral daily  pantoprazole  Injectable 40 milliGRAM(s) IV Push every 12 hours  piperacillin/tazobactam IVPB. 3.375 Gram(s) IV Intermittent every 8 hours  vancomycin  IVPB 1000 milliGRAM(s) IV Intermittent every 24 hours    MEDICATIONS  (PRN):  acetaminophen   Tablet. 650 milliGRAM(s) Oral every 6 hours PRN Mild Pain (1 - 3)  lidocaine 5% Ointment 1 Application(s) Topical two times a day PRN neuropathic pain  oxyCODONE    5 mG/acetaminophen 325 mG 2 Tablet(s) Oral every 6 hours PRN Severe Pain (7 - 10)  traMADol 50 milliGRAM(s) Oral every 8 hours PRN Moderate Pain (4 - 6)      LABS:                        8.3    7.8   )-----------( 175      ( 30 Oct 2017 07:19 )             26.6     10-30    140  |  108  |  20  ----------------------------<  92  3.6   |  26  |  1.20    Ca    7.6<L>      30 Oct 2017 07:19      PT/INR - ( 29 Oct 2017 06:45 )   PT: 29.9 sec;   INR: 2.69 ratio         PTT - ( 29 Oct 2017 06:45 )  PTT:43.3 sec  Urinalysis Basic - ( 28 Oct 2017 19:19 )    Color: Yellow / Appearance: Slightly Turbid / S.005 / pH: x  Gluc: x / Ketone: Negative  / Bili: Negative / Urobili: Negative   Blood: x / Protein: 75 mg/dL / Nitrite: Positive   Leuk Esterase: Moderate / RBC: x / WBC 6-10   Sq Epi: x / Non Sq Epi: Few / Bacteria: Few      CAPILLARY BLOOD GLUCOSE          10-28 @ 23:02   No growth  --  --  10-28 @ 16:48   No growth to date.  --  -- Patient is a 82y old  Female who presents with a chief complaint of weakness and pain both legs found to have low hemoglobin     Interval HPI:    Overnight, patient desaturated to 87% on room air. Patient was placed on 2 L NC with improvement to 95%. Patient was seen today morning at the bedside. She states that she still has pain the the b/l lower feet with right> left. She Also admits to pain at the 2nd toe on the right. She denies any fever, chest pain, sob, blood in the stool, diarrhea, constipation, dysuria or hematuria.    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  Osteoarthritis  Afib  PVD (peripheral vascular disease)  Neuropathy: (Right lower leg)  H/O cerebral aneurysm repair: brain clips  CVA (cerebral vascular accident): (&quot;Mini-stroke&quot;,&#x27;s)  Rheumatoid arthritis  Hyperlipidemia  Hypertension  S/P cataract surgery: (Left eye)  Elective surgery: (&quot;Twisted bowel&quot;, )  Elective surgery: (Exision of cyst on liver, )  S/P ORIF (open reduction internal fixation) fracture: Left hip fx () &amp; R hip fx ()  H/O cerebral aneurysm repair: Brain clips ((  Renal stone: Cysto stent placement 10/1/2014  PVD (peripheral vascular disease): s/p RLE bypass x 3, most recent 3/2012 Right external iliac to PT bypass w/ PTFE ()  S/P FRED (total abdominal hysterectomy): (, Hx of &quot;ovarian cancer?&quot;)      INTERVAL HPI/OVERNIGHT EVENTS:  T(C): 37.3 (10-30-17 @ 13:17), Max: 37.3 (10-30-17 @ 13:17)  HR: 72 (10-30-17 @ 13:17) (72 - 92)  BP: 100/59 (10-30-17 @ 13:17) (97/60 - 124/76)  RR: 17 (10-30-17 @ 13:17) (15 - 17)  SpO2: 92% (10-30-17 @ 13:17) (92% - 96%)  Wt(kg): --  I&O's Summary    29 Oct 2017 07:01  -  30 Oct 2017 07:00  --------------------------------------------------------  IN: 635 mL / OUT: 0 mL / NET: 635 mL    30 Oct 2017 07:01  -  30 Oct 2017 14:10  --------------------------------------------------------  IN: 240 mL / OUT: 0 mL / NET: 240 mL        REVIEW OF SYSTEMS:  CONSTITUTIONAL: denies fever, chills, fatigue, weakness  HEENT: denies blurred vision, sore throat  SKIN: denies new lesions, rash  CARDIOVASCULAR: denies chest pain, chest pressure, palpitations  RESPIRATORY: denies shortness of breath, sputum production  GASTROINTESTINAL: denies nausea, vomiting, diarrhea, abdominal pain, dyschezia  GENITOURINARY: denies dysuria, discharge  NEUROLOGICAL: denies numbness, headache, focal weakness  MUSCULOSKELETAL: denies new joint pain, muscle aches\  PSYCH: denies anxiety/depression at this time      PHYSICAL EXAM:  GENERAL: NAD,   HEAD:  Atraumatic, Normocephalic  EYES: EOMI, fixed dilated pupil on the left  NERVOUS SYSTEM:  Alert & Oriented X3, poor concentration; Motor Strength 5/5 B/L upper and lower extremities;   CHEST/LUNG: Clear to percussion bilaterally; No rales, rhonchi, wheezing, or rubs  HEART: Regular rate and rhythm; No murmurs, rubs, or gallops  ABDOMEN: Soft, Nontender, Nondistended; Bowel sounds present  EXTREMITIES:  trace Peripheral Pulses in bilateral lower extremities, b/l feet are warm, tender to palpation on the right foot at the toe    SKIN: right foot, 2nd toe lesion blackened with necrotic appearance, trace erythema distal to surgical pen marking,     MEDICATIONS  (STANDING):  amLODIPine   Tablet 5 milliGRAM(s) Oral at bedtime  atorvastatin 40 milliGRAM(s) Oral at bedtime  gabapentin 100 milliGRAM(s) Oral three times a day  losartan 50 milliGRAM(s) Oral daily  metoprolol     tartrate 100 milliGRAM(s) Oral two times a day  multivitamin 1 Tablet(s) Oral daily  pantoprazole  Injectable 40 milliGRAM(s) IV Push every 12 hours  piperacillin/tazobactam IVPB. 3.375 Gram(s) IV Intermittent every 8 hours  vancomycin  IVPB 1000 milliGRAM(s) IV Intermittent every 24 hours    MEDICATIONS  (PRN):  acetaminophen   Tablet. 650 milliGRAM(s) Oral every 6 hours PRN Mild Pain (1 - 3)  lidocaine 5% Ointment 1 Application(s) Topical two times a day PRN neuropathic pain  oxyCODONE    5 mG/acetaminophen 325 mG 2 Tablet(s) Oral every 6 hours PRN Severe Pain (7 - 10)  traMADol 50 milliGRAM(s) Oral every 8 hours PRN Moderate Pain (4 - 6)      LABS:                        8.3    7.8   )-----------( 175      ( 30 Oct 2017 07:19 )             26.6     10-30    140  |  108  |  20  ----------------------------<  92  3.6   |  26  |  1.20    Ca    7.6<L>      30 Oct 2017 07:19      PT/INR - ( 29 Oct 2017 06:45 )   PT: 29.9 sec;   INR: 2.69 ratio         PTT - ( 29 Oct 2017 06:45 )  PTT:43.3 sec  Urinalysis Basic - ( 28 Oct 2017 19:19 )    Color: Yellow / Appearance: Slightly Turbid / S.005 / pH: x  Gluc: x / Ketone: Negative  / Bili: Negative / Urobili: Negative   Blood: x / Protein: 75 mg/dL / Nitrite: Positive   Leuk Esterase: Moderate / RBC: x / WBC 6-10   Sq Epi: x / Non Sq Epi: Few / Bacteria: Few      CAPILLARY BLOOD GLUCOSE          10-28 @ 23:02   No growth  --  --  10-28 @ 16:48   No growth to date.  --  --

## 2017-10-30 NOTE — CONSULT NOTE ADULT - SUBJECTIVE AND OBJECTIVE BOX
Infectious Diseases Consult by Judi Coyne MD    Reason for Consult : infected right 2nd oe wit OM     HPI:  82F PMHx Afib on Coumadin, HLD, CVA, MVP, OA, HTN, PVD (s/p RLE bypassx3, most recently 2012), Neuropathy, GERD presents with pain and swelling to right second toe that started 1 month ago. Patient states foot became red, painful, and a small wound with a scab started forming on the top portion of her second toe. It became extremely difficult to bear weight on the foot so she went to see Dr. Isaacs at the wound center and he drained the wound at which point, yellow pus drained from the wound. He prescribed her an antibiotic (she does not recall which one). She denies any fevers, chills, chest pain or shortness of breat. Admits to joint pain due to her RA.    She complaints of intermittent sharp pian in right foot .No wound cs done in ER . She is now scheduled for possible partial amputation of right 2nd toe in am . 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  Osteoarthritis  Afib  PVD (peripheral vascular disease)  Neuropathy: (Right lower leg)  H/O cerebral aneurysm repair: brain clips  CVA (cerebral vascular accident): (&quot;Mini-stroke&quot;,1990&#x27;s)  Rheumatoid arthritis  Hyperlipidemia  Hypertension  S/P cataract surgery: (Left eye)  Elective surgery: (&quot;Twisted bowel&quot;, 2014)  Elective surgery: (Exision of cyst on liver, 1985)  S/P ORIF (open reduction internal fixation) fracture: Left hip fx (2012) &amp; R hip fx (2013)  H/O cerebral aneurysm repair: Brain clips (1978(  Renal stone: Cysto stent placement 10/1/2014  PVD (peripheral vascular disease): s/p RLE bypass x 3, most recent 3/2012 Right external iliac to PT bypass w/ PTFE (2012)  S/P FRED (total abdominal hysterectomy): (1987, Hx of &quot;ovarian cancer?&quot;)      Social History-- Retired   EtOH: denies   Tobacco: denies   Drug Use: denies     FAMILY HISTORY:  Family history of aneurysm      Allergies    No Known Allergies    Intolerances    Home/ Out patient  Medications :  Home Medications:  amLODIPine 5 mg oral tablet: 1 tab(s) orally once a day (at bedtime) (28 Oct 2017 15:00)  aspirin 81 mg oral delayed release tablet: 1 tab(s) orally once a day (28 Oct 2017 15:00)  atorvastatin 40 mg oral tablet: 1 tab(s) orally once a day (at bedtime) (28 Oct 2017 15:00)  gabapentin 100 mg oral capsule:  orally 3 times a day (28 Oct 2017 15:00)  losartan 50 mg oral tablet: 1 tab(s) orally once a day (at bedtime) (28 Oct 2017 15:00)  metoprolol tartrate 100 mg oral tablet: 1 tab(s) orally 2 times a day (28 Oct 2017 15:00)  multivitamin:   once a day (28 Oct 2017 15:00)  omeprazole 40 mg oral delayed release capsule: 1 cap(s) orally once a day (at bedtime) (28 Oct 2017 15:00)  traMADol 50 mg oral tablet: 1 tab(s) orally 3 times a day (28 Oct 2017 15:00)      Current Inpatient Medications :    ANTIBIOTICS:   piperacillin/tazobactam IVPB. 3.375 Gram(s) IV Intermittent every 8 hours      OTHER RELEVANT MEDICATIONS :  acetaminophen   Tablet. 650 milliGRAM(s) Oral every 6 hours PRN  amLODIPine   Tablet 5 milliGRAM(s) Oral at bedtime  atorvastatin 40 milliGRAM(s) Oral at bedtime  bisacodyl 20 milliGRAM(s) Oral at bedtime  gabapentin 100 milliGRAM(s) Oral three times a day  lidocaine 5% Ointment 1 Application(s) Topical two times a day PRN  losartan 50 milliGRAM(s) Oral daily  metoprolol     tartrate 100 milliGRAM(s) Oral two times a day  multivitamin 1 Tablet(s) Oral daily  oxyCODONE    5 mG/acetaminophen 325 mG 2 Tablet(s) Oral every 6 hours PRN  pantoprazole  Injectable 40 milliGRAM(s) IV Push every 12 hours  polyethylene glycol/electrolyte Solution 1000 milliLiter(s) Oral every 4 hours  traMADol 50 milliGRAM(s) Oral every 8 hours PRN      ROS:  CONSTITUTIONAL:  Negative fever or chills, feels well, good appetite  EYES:  Negative  blurry vision or double vision  CARDIOVASCULAR:  Negative for chest pain or palpitations  RESPIRATORY:  Negative for cough, wheezing, or SOB   GASTROINTESTINAL:  Negative for nausea, vomiting, diarrhea, constipation, or abdominal pain  GENITOURINARY:  Negative frequency, urgency , dysuria or hematuria   NEUROLOGIC:  No headache, confusion, dizziness, lightheadedness  All other systems were reviewed and are negative      I&O's Detail    29 Oct 2017 07:01  -  30 Oct 2017 07:00  --------------------------------------------------------  IN:    Packed Red Blood Cells: 285 mL    Solution: 250 mL    Solution: 100 mL  Total IN: 635 mL    OUT:  Total OUT: 0 mL    Total NET: 635 mL      30 Oct 2017 07:01  -  30 Oct 2017 17:37  --------------------------------------------------------  IN:    Oral Fluid: 240 mL    Solution: 100 mL    Solution: 250 mL  Total IN: 590 mL    OUT:  Total OUT: 0 mL    Total NET: 590 mL      Physical Exam:  Vital Signs Last 24 Hrs  T(C): 37.3 (30 Oct 2017 13:17), Max: 37.3 (30 Oct 2017 13:17)  T(F): 99.2 (30 Oct 2017 13:17), Max: 99.2 (30 Oct 2017 13:17)  HR: 72 (30 Oct 2017 13:17) (72 - 92)  BP: 100/59 (30 Oct 2017 13:17) (100/59 - 124/76)  BP(mean): --  RR: 17 (30 Oct 2017 13:17) (15 - 17)  SpO2: 92% (30 Oct 2017 13:17) (92% - 96%)      General: well developed well nourished, in no acute distress  Eyes: sclera anicteric, pupils equal and reactive to light  ENMT: buccal mucosa moist, pharynx not injected  Neck: supple, trachea midline  Lungs: clear, no wheeze/rhonchi  Cardiovascular: regular rate and rhythm, S1 S2  Abdomen: soft, nontender, no organomegaly present, bowel sounds normal  Neurological:  alert and oriented x3, Cranial Nerves II-XII grossly intact  Skin: no increased ecchymosis/petechiae/purpura  Lymph Nodes: no palpable cervical/supraclavicular lymph nodes enlargements  Extremities: no cyanosis/clubbing, gangrenous right 2nd toe , erythema localized to right dorsal 2nd PIPJ, open lesion present to right dorsal 2nd PIPJ aspect with purulent drainage, +purulence , + probe to bone, wound base is fibrotic and purulent, malodor +, nails 1-10 wnl. no other open lesion. skin is thin, atrophic and shiny.       Labs:                9.1    x      )----------(  x         ( 30 Oct 2017 13:59 )               29.9      140    |  108    |  20     ----------------------------<  92         ( 30 Oct 2017 07:19 )  3.6     |  26     |  1.20     Ca    7.6        ( 30 Oct 2017 07:19 )    Sedimentation Rate, Erythrocyte (10.28.17 @ 13:40)    Sedimentation Rate, Erythrocyte: 119 mm/hr    C-Reactive Protein, Serum (10.28.17 @ 23:01)    C-Reactive Protein, Serum: 10.50 mg/dL    Hemoglobin A1C, Whole Blood (10.28.17 @ 23:01)    Hemoglobin A1C, Whole Blood: 5.5:       RECENT CULTURES:    Culture - Urine (collected 28 Oct 2017 23:02)  Source: .Urine Clean Catch (Midstream)  Final Report (29 Oct 2017 23:21):    No growth    Culture - Blood (collected 28 Oct 2017 16:48)  Source: .Blood Blood-Peripheral  Preliminary Report (29 Oct 2017 17:01):    No growth to date.    Culture - Blood (collected 28 Oct 2017 16:48)  Source: .Blood Blood-Peripheral  Preliminary Report (29 Oct 2017 17:01):    No growth to date.      RADIOLOGY & ADDITIONAL STUDIES:    Assessment :   82 y.o. female with multiple medical problems Afib on Coumadin, HLD, CVA, MVP, OA, HTN, PVD (s/p RLE bypassx3, most recently 2012), Neuropathy, GERD  presents with  ischemic right foot and leg coupled with gangrene of right 2nd. toe.  She has multiple failed bypasses of right leg. 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     She is scheduled for possible amputation of the right 2nd toe in am.  She was seen by vascular surgery who recommends vascular work up before  any digital amputation as she has poor circulation     Plan :   - continue with Iv antibiotics for now   - follow CTA of the leg, may need vascular intervention before digit intervention   - trend renal function and CBC  - will discuss with podiatry     Continue with present regime .  Appropriate use of antibiotics and adverse effects reviewed.    I have discussed the above plan of care with patient and family in detail. They expressed understanding of the treatment plan . Risks, benefits and alternatives discussed in detail. I have asked if they have any questions or concerns and appropriately addressed them to the best of my ability .      > 45 minutes spent in direct patient care reviewing  the notes, lab data/ imaging , discussion with multidisciplinary team. All questions were addressed and answered to the best of my capacity .    Thank you for allowing me to participate in the care of your patient .      Judi Coyne MD  [phone]

## 2017-10-30 NOTE — CONSULT NOTE ADULT - ASSESSMENT
81 yo woman with extensive history of vascular disease with arterial insufficiency in the LE's, previous history of GI bleed in 2015; has been seen by our group multiple times but has failed to follow up in office; now admitted for chronic right foot wound (likely osteomyelitis) and found to have anemia  - iron studies c/w chronic disease but cannot rule out GI blood loss with patient on chronic anticoagulation with coumadin as outpatient  - anticoagulants on hold for now  - would recheck Folate/B12 level  - advise supportive transfusion to maintain Hg >7 however likely will not improve until wound is well healed  - follow CBC daily

## 2017-10-30 NOTE — PROGRESS NOTE ADULT - PROBLEM SELECTOR PLAN 2
continue vanco/zosyn for now. f/u vanc trough  f/u podiatry (dr. Temple) for potential surgical procedure pending evalution for GI bleed  inflammatory markers suggestive of OM however cannot undergo MRI due to intracranial surgical clipping from 1980's  f/u vascular surgery (Dr. Sánchez)  f/u arterial doppler of RLE to further evaluate patency of stents

## 2017-10-30 NOTE — CONSULT NOTE ADULT - SUBJECTIVE AND OBJECTIVE BOX
History of Present Illness:  82y Female with a  long history of right >>left PAD presents with a ischemic right foot and gangrenous ulcer of right 2nd. toe. She has had at least 2 failed RLE bypasses as well as prior right leg stents. She admits to pain at rest in her right foot and disabling claudication in both legs. I was requested to evaluate her LE circulation. The patient is on Coumadin for atrial fibrillation.    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  Osteoarthritis  Afib  PVD (peripheral vascular disease)  Neuropathy: (Right lower leg)  H/O cerebral aneurysm repair: brain clips  CVA (cerebral vascular accident): (&quot;Mini-stroke&quot;,1990&#x27;s)  Rheumatoid arthritis  Hyperlipidemia  Hypertension  S/P cataract surgery: (Left eye)  Elective surgery: (&quot;Twisted bowel&quot;, 2014)  Elective surgery: (Exision of cyst on liver, 1985)  S/P ORIF (open reduction internal fixation) fracture: Left hip fx (2012) &amp; R hip fx (2013)  H/O cerebral aneurysm repair: Brain clips (1978(  Renal stone: Cysto stent placement 10/1/2014  PVD (peripheral vascular disease): s/p RLE bypass x 3, most recent 3/2012 Right external iliac to PT bypass w/ PTFE (2012)  S/P FRED (total abdominal hysterectomy): (1987, Hx of &quot;ovarian cancer?&quot;)      Allergies    No Known Allergies    Intolerances        MEDICATIONS  (STANDING):  amLODIPine   Tablet 5 milliGRAM(s) Oral at bedtime  atorvastatin 40 milliGRAM(s) Oral at bedtime  bisacodyl 20 milliGRAM(s) Oral at bedtime  gabapentin 100 milliGRAM(s) Oral three times a day  losartan 50 milliGRAM(s) Oral daily  metoprolol     tartrate 100 milliGRAM(s) Oral two times a day  multivitamin 1 Tablet(s) Oral daily  pantoprazole  Injectable 40 milliGRAM(s) IV Push every 12 hours  piperacillin/tazobactam IVPB. 3.375 Gram(s) IV Intermittent every 8 hours  polyethylene glycol/electrolyte Solution 1000 milliLiter(s) Oral every 4 hours  vancomycin  IVPB 1000 milliGRAM(s) IV Intermittent every 24 hours    MEDICATIONS  (PRN):  acetaminophen   Tablet. 650 milliGRAM(s) Oral every 6 hours PRN Mild Pain (1 - 3)  lidocaine 5% Ointment 1 Application(s) Topical two times a day PRN neuropathic pain  oxyCODONE    5 mG/acetaminophen 325 mG 2 Tablet(s) Oral every 6 hours PRN Severe Pain (7 - 10)  traMADol 50 milliGRAM(s) Oral every 8 hours PRN Moderate Pain (4 - 6)      Social History:  Smoking History: long hx of smoking but stopped 18 years ago  Alcohol Use: social    REVIEW OF SYSTEMS:  CONSTITUTIONAL: No weakness, fevers or chills  EYES/ENT: No visual changes;  No vertigo or throat pain   NECK: No pain or stiffness  RESPIRATORY: No cough, wheezing, hemoptysis; No shortness of breath  CARDIOVASCULAR: No chest pain or palpitations  GASTROINTESTINAL: No abdominal or epigastric pain. No nausea, vomiting, or hematemesis; No diarrhea or constipation. +++ melena   GENITOURINARY: No dysuria, frequency or hematuria  NEUROLOGICAL: No numbness or weakness  SKIN: No itching, burning, rashes, or lesions   Vascular:   pedal rest pain and digital ulcers right foot and b/l calf claudication  All other review of systems is negative unless indicated above.    PHYSICAL EXAM:  General:  On exam, the patient is alert nontoxic appearing Female in no acute distress.  Vital Signs Last 24 Hrs  T(C): 37.3 (30 Oct 2017 13:17), Max: 37.3 (30 Oct 2017 13:17)  T(F): 99.2 (30 Oct 2017 13:17), Max: 99.2 (30 Oct 2017 13:17)  HR: 72 (30 Oct 2017 13:17) (72 - 92)  BP: 100/59 (30 Oct 2017 13:17) (97/60 - 124/76)  BP(mean): --  RR: 17 (30 Oct 2017 13:17) (15 - 17)  SpO2: 92% (30 Oct 2017 13:17) (92% - 96%)    Neck:  4+/4+ bilateral carotid pulses; no carotid bruit, no palpable cervical masses.  Heart: irregular, no murmurs, rubs or gallops.    Lungs:  Clear to auscultation but decreased BS at both bases   Chest:  No chest wall deformities  Symmetrical chest expansion.   Abdomen: Soft and nontender.  No palpable masses.  No rebound, guarding or rigidity.  Extremities:  right foot and leg is cool from knee to foot with rubor discoloration of right foot and calf.  There is a gangrenous digital ulcer on dorsal side  of right 2nd. toe. The calf and thigh muscles are soft and nontender.  There are no palpable cords or limb cellulitis.  Sienna's sign  is negative bilaterally.  There is no lower extremity edema.  On examination of the peripheral pulses:  Left leg femoral pulse is 3/4   , popliteal pulse is  2/4  ,PT Pulse is  0  , DP Pulse is 2/4   Right leg femoral pulse is  1-2/4  ,popliteal pulse is  0 , PT Pulse is 0  , DP Pulse is 0  Neurological:  There are no motor or sensory deficits in either lower extremity.                          9.1    x     )-----------( x        ( 30 Oct 2017 13:59 )             29.9     10-30    140  |  108  |  20  ----------------------------<  92  3.6   |  26  |  1.20    Ca    7.6<L>      30 Oct 2017 07:19          PT/INR - ( 29 Oct 2017 06:45 )   PT: 29.9 sec;   INR: 2.69 ratio         PTT - ( 29 Oct 2017 06:45 )  PTT:43.3 sec    Radiology:

## 2017-10-30 NOTE — PROGRESS NOTE ADULT - ATTENDING COMMENTS
I personally conducted a physical examination of the patient. I personally gathered the patient's history. I edited the above listed findings which were prepared by the listed resident physician. I personally discussed the plan of care with the patient. The questions and concerns were addressed to the best of my ability. The patient is in agreement with the listed treatment plan.     A/P: 83yo F w/ h/o CVA, severe PAD and poor vascular flow in RLE presents w/ ischemic ulcer 2nd digit R toe, symptomatic anemia due to suspected upper GIB. Plan for CT angio RLE tonight. Plan for EGD/colon tomorrow. I reviewed labs, CXR, EKG. EKG shows no evidence of ischemia. RCRI score of 2 and considering pt's h/o severe PAD w/ h/o tobacco use, significant calcification of aorta on CXR, poor exercise tolerance pt is considered intermediate to high risk for low risk procedure (EGD/colon). Recommend holding pharmacologic anticoagulation in michelle-operative period. F/u AM CBC, coags, BMP. Resume AC when appropriate. Start ICD's for DVT ppx in the interim. I personally conducted a physical examination of the patient. I personally gathered the patient's history. I edited the above listed findings which were prepared by the listed resident physician. I personally discussed the plan of care with the patient. The questions and concerns were addressed to the best of my ability. The patient is in agreement with the listed treatment plan.     A/P: 83yo F w/ h/o CVA, severe PAD and poor vascular flow in RLE presents w/ ischemic ulcer 2nd digit R toe, symptomatic anemia due to suspected upper GIB. Plan for CT angio RLE tonight. Plan for EGD/colon tomorrow. I reviewed labs, CXR, EKG. EKG shows no evidence of ischemia. RCRI score of 1 and considering pt's h/o severe PAD w/ h/o tobacco use, significant calcification of aorta on CXR, poor exercise tolerance pt is considered intermediate to high risk for low risk procedure (EGD/colon). Recommend holding pharmacologic anticoagulation in michelle-operative period. F/u AM CBC, coags, BMP. Resume AC when appropriate. Start ICD's for DVT ppx in the interim.

## 2017-10-30 NOTE — PROGRESS NOTE ADULT - SUBJECTIVE AND OBJECTIVE BOX
INTERVAL HPI/OVERNIGHT EVENTS:    denies abd pain, nausea/vomting, blood in stools     HPI:  82F PMHx Afib on Coumadin, HLD, CVA, MVP, OA, HTN, PVD (s/p RLE bypassx3, most recently ), Neuropathy, GERD presents with pain and swelling to right second toe that started 1 month ago. Patient states foot became red, painful, and a small wound with a scab started forming on the top portion of her second toe. It became extremely difficult to bear weight on the foot so she went to see Dr. Isaacs at the wound center and he drained the wound at which point, yellow pus drained from the wound. He prescribed her an antibiotic (she does not recall which one). She denies any fevers, chills, chesst pain or shortness of breat. Admits to joint pain due to her RA.     In ED, pt's vital was wnl. WBC 8.2, Hct: 26.1, lactate 0.8, , CMP revealed elevated bilirubin of 1.5, remainder is wnl , INR: 2.26. CXR showed no acute pathology. X ray of Rt foot showed possible small periarticular erosion medial aspect head of the proximal phalanx which might represent infection/ osteomyelitis. Pt received 1 dose  of  vanco and 1 dose of zosyn and 1 bolus of NS, BCX, UCX was sent in  ED. (28 Oct 2017 13:42)    MEDICATIONS  (STANDING):  amLODIPine   Tablet 5 milliGRAM(s) Oral at bedtime  atorvastatin 40 milliGRAM(s) Oral at bedtime  bisacodyl 20 milliGRAM(s) Oral at bedtime  gabapentin 100 milliGRAM(s) Oral three times a day  losartan 50 milliGRAM(s) Oral daily  metoprolol     tartrate 100 milliGRAM(s) Oral two times a day  multivitamin 1 Tablet(s) Oral daily  pantoprazole  Injectable 40 milliGRAM(s) IV Push every 12 hours  piperacillin/tazobactam IVPB. 3.375 Gram(s) IV Intermittent every 8 hours  polyethylene glycol/electrolyte Solution 1000 milliLiter(s) Oral every 4 hours  vancomycin  IVPB 1000 milliGRAM(s) IV Intermittent every 24 hours    MEDICATIONS  (PRN):  acetaminophen   Tablet. 650 milliGRAM(s) Oral every 6 hours PRN Mild Pain (1 - 3)  lidocaine 5% Ointment 1 Application(s) Topical two times a day PRN neuropathic pain  oxyCODONE    5 mG/acetaminophen 325 mG 2 Tablet(s) Oral every 6 hours PRN Severe Pain (7 - 10)  traMADol 50 milliGRAM(s) Oral every 8 hours PRN Moderate Pain (4 - 6)      Allergies    No Known Allergies    Intolerances          General:  No wt loss, fevers, chills, night sweats, fatigue,   Eyes:  Good vision, no reported pain  ENT:  No sore throat, pain, runny nose, dysphagia  CV:  No pain, palpitations, hypo/hypertension  Resp:  No dyspnea, cough, tachypnea, wheezing  GI:  No pain, No nausea, No vomiting, No diarrhea, No constipation, No weight loss, No fever, No pruritis, No rectal bleeding, No tarry stools, No dysphagia,  :  No pain, bleeding, incontinence, nocturia  Muscle: le pain  Neuro:  No weakness, tingling, memory problems  Psych:  No fatigue, insomnia, mood problems, depression  Endocrine:  No polyuria, polydipsia, cold/heat intolerance  Heme:  No petechiae, ecchymosis, easy bruisability  Skin:  No rash, tattoos, scars, edema      PHYSICAL EXAM:   Vital Signs:  Vital Signs Last 24 Hrs  T(C): 37.3 (30 Oct 2017 13:17), Max: 37.3 (30 Oct 2017 13:17)  T(F): 99.2 (30 Oct 2017 13:17), Max: 99.2 (30 Oct 2017 13:17)  HR: 72 (30 Oct 2017 13:17) (72 - 92)  BP: 100/59 (30 Oct 2017 13:17) (97/60 - 124/76)  BP(mean): --  RR: 17 (30 Oct 2017 13:17) (15 - 17)  SpO2: 92% (30 Oct 2017 13:17) (92% - 96%)  Daily     Daily I&O's Summary    29 Oct 2017 07:01  -  30 Oct 2017 07:00  --------------------------------------------------------  IN: 635 mL / OUT: 0 mL / NET: 635 mL    30 Oct 2017 07:01  -  30 Oct 2017 16:39  --------------------------------------------------------  IN: 590 mL / OUT: 0 mL / NET: 590 mL        GENERAL:  Appears stated age, well-groomed, well-nourished, no distress  HEENT:  NC/AT,  conjunctivae clear and pink, no thyromegaly, nodules, adenopathy, no JVD, sclera -anicteric  CHEST:  Full & symmetric excursion, no increased effort, breath sounds clear  HEART:  Regular rhythm, S1, S2, no murmur/rub/S3/S4, no abdominal bruit, no edema  ABDOMEN:  Soft, non-tender, non-distended, normoactive bowel sounds,  no masses ,no hepato-splenomegaly, no signs of chronic liver disease  EXTEREMITIES:  no cyanosis,clubbing or edema, rle dressing in place  SKIN:  No rash/erythema/ecchymoses/petechiae/wounds/abscess/warm/dry  NEURO:  Alert, oriented, no asterixis, no tremor, no encephalopathy      LABS:                        9.1    x     )-----------( x        ( 30 Oct 2017 13:59 )             29.9     10-30    140  |  108  |  20  ----------------------------<  92  3.6   |  26  |  1.20    Ca    7.6<L>      30 Oct 2017 07:19      PT/INR - ( 29 Oct 2017 06:45 )   PT: 29.9 sec;   INR: 2.69 ratio         PTT - ( 29 Oct 2017 06:45 )  PTT:43.3 sec  Urinalysis Basic - ( 28 Oct 2017 19:19 )    Color: Yellow / Appearance: Slightly Turbid / S.005 / pH: x  Gluc: x / Ketone: Negative  / Bili: Negative / Urobili: Negative   Blood: x / Protein: 75 mg/dL / Nitrite: Positive   Leuk Esterase: Moderate / RBC: x / WBC 6-10   Sq Epi: x / Non Sq Epi: Few / Bacteria: Few      amylase   lipase  RADIOLOGY & ADDITIONAL TESTS:

## 2017-10-30 NOTE — CONSULT NOTE ADULT - ASSESSMENT
82F PMHx Afib on Coumadin, HLD, CVA, MVP, OA, HTN, PVD (s/p RLE bypassx3, most recently 2012), Neuropathy, GERD presents with pain and swelling to right second toe a/w RLE cellulitis, r/o osteomyelitis. Found to have symptomatic anemia from acute blood loss, suspect GI source.    -no evidence of acute ischemia or meaningful volume overload  -suspected GI bleed, hold coumadin, therapeutic INR, follow up daily INR level  -s/p 1 unit pRBCs with appropriate increase in H/H  -Last TTE:  - 82F PMHx Afib on Coumadin, HLD, CVA, MVP, OA, HTN, PVD (s/p RLE bypassx3, most recently 2012), Neuropathy, GERD presents with pain and swelling to right second toe a/w RLE cellulitis, r/o osteomyelitis. Found to have symptomatic anemia from acute blood loss, suspect GI source.    -no evidence of acute ischemia or meaningful volume overload  -suspected GI bleed, hold coumadin temporarily, therapeutic INR, follow up daily INR level  -s/p 1 unit pRBCs with appropriate increase in H/H. C ontinue to trend  -Last TTE: shows normal LV function  -Abx per primary team  -Continue amlodipine 5 QD  -Continue losartan 50 qd  -Continue statin drug  -Continue metoprolol 100 BID  -To follow closely with you

## 2017-10-30 NOTE — CONSULT NOTE ADULT - SUBJECTIVE AND OBJECTIVE BOX
S: Podiatry consulted for 82 y old Female with PMH of HLD, CVA, MVP, OA, HTN, PVD, Neuropathy, GERD with chief complaint of Right 2nd digit cellulitis.     As per H&P, "  History of Present Illness: 	  82F PMHx Afib on Coumadin, HLD, CVA, MVP, OA, HTN, PVD (s/p RLE bypassx3, most recently 2012), Neuropathy, GERD presents with pain and swelling to right second toe that started 1 month ago. Patient states foot became red, painful, and a small wound with a scab started forming on the top portion of her second toe. It became extremely difficult to bear weight on the foot so she went to see Dr. Isaacs at the wound center and he drained the wound at which point, yellow pus drained from the wound. He prescribed her an antibiotic (she does not recall which one). She denies any fevers, chills, chesst pain or shortness of breat. Admits to joint pain due to her RA."    Vitals:   T(C): 37.1 (10-30-17 @ 05:21), Max: 37.1 (10-30-17 @ 05:21)  HR: 92 (10-30-17 @ 05:21) (72 - 92)  BP: 110/65 (10-30-17 @ 05:21) (97/60 - 124/76)  RR: 17 (10-30-17 @ 05:21) (15 - 17)  SpO2: 95% (10-30-17 @ 05:43) (93% - 96%)    Phys Exam:   Derm:   Neuro:   MSK:   Vasc:     Labs:                         8.3    7.8   )-----------( 175      ( 30 Oct 2017 07:19 )             26.6   10-30    140  |  108  |  20  ----------------------------<  92  3.6   |  26  |  1.20    Ca    7.6<L>      30 Oct 2017 07:19    TPro  7.0  /  Alb  3.0<L>  /  TBili  1.5<H>  /  DBili  x   /  AST  18  /  ALT  16  /  AlkPhos  73  10-28      28-Oct-2017 13:23, Xray Toes, Right Foot  PACS Image: Image(s) Available  Xray Toes, Right Foot: EXAM:  TOES(S) RIGHT FOOT                        	  	  	PROCEDURE DATE:  10/28/2017    	  	  	  	INTERPRETATION:  Clinical information: Infected right second toe.  	  	3 views of the right forefoot which include the toes.  	  	Comparison: Right foot radiograph 3/6/2012.  	  	There is prominent osteopenia.  	There is soft tissue prominence involving the PIP joint of the second toe.  	Hammertoe deformities limits evaluation of the toes.  	There is a possible small periarticular erosion along the medial aspect   	of head of the second proximal phalanx.  	No acute fracture is noted.  	  	Impression:  	  	Limited study.   	Soft tissue prominence second toe with possible small periarticular   	erosion medial aspect head of the proximal phalanx. Findings could   	represent infection/osteomyelitis.  	Recommend further evaluation with MRI if there are no clinical   	contraindications.  	  	  	  	  	  	  	  	FLAVIO BARAHONA M.D., ATTENDING RADIOLOGIST  	This document has been electronically signed. Oct 28 2017  1:31PM S: Podiatry consulted for 82 y old Female with PMH of HLD, CVA, MVP, OA, HTN, PVD, Neuropathy, GERD with chief complaint of Right 2nd digit cellulitis. Pt relates that the pain has progressively worsened for the last couple of days. Pt notes purulent drainage. Pt has been on oral antibiotics prior to her admission. Pt denies any n/v/c/f today.     As per H&P, "  History of Present Illness: 	  82F PMHx Afib on Coumadin, HLD, CVA, MVP, OA, HTN, PVD (s/p RLE bypassx3, most recently 2012), Neuropathy, GERD presents with pain and swelling to right second toe that started 1 month ago. Patient states foot became red, painful, and a small wound with a scab started forming on the top portion of her second toe. It became extremely difficult to bear weight on the foot so she went to see Dr. Isaacs at the wound center and he drained the wound at which point, yellow pus drained from the wound. He prescribed her an antibiotic (she does not recall which one). She denies any fevers, chills, chesst pain or shortness of breat. Admits to joint pain due to her RA."    Vitals:   T(C): 37.1 (10-30-17 @ 05:21), Max: 37.1 (10-30-17 @ 05:21)  HR: 92 (10-30-17 @ 05:21) (72 - 92)  BP: 110/65 (10-30-17 @ 05:21) (97/60 - 124/76)  RR: 17 (10-30-17 @ 05:21) (15 - 17)  SpO2: 95% (10-30-17 @ 05:43) (93% - 96%)    Phys Exam:   Derm: erythema localized to right dorsal 2nd PIPJ, open lesion present to right dorsal 2nd PIPJ aspect with purulent drainage, +purulence , + probe to bone, wound base is fibrotic and purulent, malodor +, nails 1-10 wnl. no other open lesion. skin is thin, atrophic and shiny.   Neuro: Gross prot sensation intact bilat  MSK: mm strength testing 5/5 all quadrants bilat, pain on palpation to right 3nd PIPJ aspect.   Vasc: DP, PT non palpable, CFT< 4 sec to all digits, absent pedal hair bilat, edematous Right 2nd digit    Labs:                         8.3    7.8   )-----------( 175      ( 30 Oct 2017 07:19 )             26.6   10-30    140  |  108  |  20  ----------------------------<  92  3.6   |  26  |  1.20    Ca    7.6<L>      30 Oct 2017 07:19    TPro  7.0  /  Alb  3.0<L>  /  TBili  1.5<H>  /  DBili  x   /  AST  18  /  ALT  16  /  AlkPhos  73  10-28      28-Oct-2017 13:23, Xray Toes, Right Foot  PACS Image: Image(s) Available  Xray Toes, Right Foot: EXAM:  TOES(S) RIGHT FOOT                        	  	  	PROCEDURE DATE:  10/28/2017    	  	  	  	INTERPRETATION:  Clinical information: Infected right second toe.  	  	3 views of the right forefoot which include the toes.  	  	Comparison: Right foot radiograph 3/6/2012.  	  	There is prominent osteopenia.  	There is soft tissue prominence involving the PIP joint of the second toe.  	Hammertoe deformities limits evaluation of the toes.  	There is a possible small periarticular erosion along the medial aspect   	of head of the second proximal phalanx.  	No acute fracture is noted.  	  	Impression:  	  	Limited study.   	Soft tissue prominence second toe with possible small periarticular   	erosion medial aspect head of the proximal phalanx. Findings could   	represent infection/osteomyelitis.  	Recommend further evaluation with MRI if there are no clinical   	contraindications.  	  	  	  	  	  	  	  	FLAVIO BARAHONA M.D., ATTENDING RADIOLOGIST  	This document has been electronically signed. Oct 28 2017  1:31PM

## 2017-10-30 NOTE — PROGRESS NOTE ADULT - PROBLEM SELECTOR PLAN 5
chronic. patient currently rate controlled  maintain HR < 110  continue metoprolol with parameters  Anticoagulation held due to anemia and evaluation for gi bleed  continue to monitor on Tele  f/u cardiology ( Dr. cain)  OOB with assistance (activity)   Fall precautions

## 2017-10-30 NOTE — PROGRESS NOTE ADULT - PROBLEM SELECTOR PLAN 1
possibly 2/2 to GI bleed given history of bleeding GI ulcer   s/p 1 unit of blood yesterday 9.1 today. continue to monitor H/h  continue to transfuse if hb< 7  f/u fobt  hold all AC, no indication for FFP or warfarin reversal at this time but will closely monitor, INR therapeutic  f/u GI (Dr. Coyne): for EGD/colonoscopy, cardio cleared for surgery. RCRI: 1 NPO after midnight  f/u cardiology (dr. ortiz): no evidence of acute ischemia at this time  f/u Heme/once (Dr. Diana) as per heme, patient has been seen multiple times but has failed to follow up in the office. f/u folate and b12 possibly 2/2 to GI bleed given history of bleeding GI ulcer   s/p 1 unit of blood yesterday 9.1 today. continue to monitor H/h  continue to transfuse if hb< 7  f/u fobt  hold all AC, no indication for FFP or warfarin reversal at this time but will closely monitor, INR therapeutic  f/u GI (Dr. Coyne): for EGD/colonoscopy  cardio michelle-operative risk stratification is pending  heme/once (Dr. Diana) f/u folate and b12, they will follow  FOBT was collected but minimal specimen obtained, I doubt this is true negative based on pt detailing h/o intermittent dark colored stool in her history

## 2017-10-30 NOTE — CONSULT NOTE ADULT - ASSESSMENT
82 y.o. female with multiple medical problems presents with a ischemic right foot and leg coupled with gangrene of right 2nd. toe.  She has multiple failed bypasses of right leg. She needs further evaluation of her LE circulation with a CTA  of her abdomen, pelvis with runoff. I would defer digital amputation at this time. I do not believe she has enough flow to obtain healing. She is at risk for a major amputation of her right leg. The patient and daughter were given informed consent.

## 2017-10-30 NOTE — CHART NOTE - NSCHARTNOTEFT_GEN_A_CORE
Called by RN because patient noted to have SpO2 87% on RA. Patient was seen and examined lying in bed comfortably. She denies sob, wheezing, chest pain but does admit to burning pain of right heel of foot (rates 5/10 on pain severity), unchanged since admission. RN placed 2L NC O2 supplemental and patient's SpO2 was 95%.     T(C): 37.1 (10-30-17 @ 05:21), Max: 37.1 (10-30-17 @ 05:21)  HR: 92 (10-30-17 @ 05:21) (72 - 92)  BP: 110/65 (10-30-17 @ 05:21) (97/60 - 124/76)  RR: 17 (10-30-17 @ 05:21) (15 - 17)  SpO2: 95% (10-30-17 @ 05:43) (93% - 96%)    GENERAL: Elderly female seen lying in bed, no respiratory distress but appears to be in mild pain due to right 2nd toe osteomyelitis.   HEAD: NC/AT  EYES: sclera clear, no exudates  ENMT: oropharynx clear without erythema, no exudates, moist mucous membranes  LUNGS: good air entry bilaterally, clear to auscultation, symmetric breath sounds, no wheezing or rhonchi appreciated  HEART: soft S1/S2, regular rate and rhythm, no murmurs noted, no lower extremity edema  GASTROINTESTINAL: abdomen is soft, nontender, nondistended, normoactive bowel sounds, no palpable masses  INTEGUMENT:  necrotic appearing ulcer on dorsal surface of right 2nd toe, tender to palpation, mildly erythematous. Sensation intact, poor pedal pulse of Right 2nd foot   MUSCULOSKELETAL: no clubbing or cyanosis, no obvious deformity  NEUROLOGIC: awake, alert, oriented x3, no obvious sensory deficits    A/P: 82F PMHx Afib on Coumadin, HLD, CVA, MVP, OA, HTN, PVD (s/p RLE bypassx3, most recently 2012), Neuropathy, GERD presents with pain and swelling to right second toe a/w RLE cellulitis, r/o osteomyelitis. Found to have symptomatic anemia from acute blood loss, suspect GI source  -hypoxic but SpO2 improved on 2L NC O2 to 95%. 2L NC O2 ordered continuously. Continue to monitor vital signs.   -burning sensation of heel, likely 2/2 PVD, osteomyelitis of 2nd right toe- Patient received percocet 3AM. Offered patient tramadol but patient refused.   -management discussed with Dr. Goldberg, PGY 2 Called by RN because patient noted to have SpO2 87% on RA. Patient was seen and examined lying in bed comfortably. She denies sob, wheezing, chest pain but does admit to burning pain of right heel of foot (rates 5/10 on pain severity), unchanged since admission. RN placed 2L NC O2 supplemental and patient's SpO2 was 95%.     T(C): 37.1 (10-30-17 @ 05:21), Max: 37.1 (10-30-17 @ 05:21)  HR: 92 (10-30-17 @ 05:21) (72 - 92)  BP: 110/65 (10-30-17 @ 05:21) (97/60 - 124/76)  RR: 17 (10-30-17 @ 05:21) (15 - 17)  SpO2: 95% (10-30-17 @ 05:43) (93% - 96%)    GENERAL: Elderly female seen lying in bed, no respiratory distress but appears to be in mild pain due to right 2nd toe osteomyelitis.   HEAD: NC/AT  EYES: sclera clear, no exudates  ENMT: oropharynx clear without erythema, no exudates, moist mucous membranes  LUNGS: good air entry bilaterally, clear to auscultation, symmetric breath sounds, no wheezing or rhonchi appreciated  HEART: soft S1/S2, regular rate and rhythm, no murmurs noted, no lower extremity edema  GASTROINTESTINAL: abdomen is soft, nontender, nondistended, normoactive bowel sounds, no palpable masses  INTEGUMENT:  necrotic appearing ulcer on dorsal surface of right 2nd toe, tender to palpation, mildly erythematous. Sensation intact, poor pedal pulse of Right 2nd foot   MUSCULOSKELETAL: no clubbing or cyanosis, no obvious deformity  NEUROLOGIC: awake, alert, oriented x3, no obvious sensory deficits    A/P: 82F PMHx Afib on Coumadin, HLD, CVA, MVP, OA, HTN, PVD (s/p RLE bypassx3, most recently 2012), Neuropathy, GERD presents with pain and swelling to right second toe a/w RLE cellulitis, r/o osteomyelitis. Found to have symptomatic anemia from acute blood loss, suspect GI source  -hypoxic but SpO2 improved on 2L NC O2 to 95%. 2L NC O2 ordered continuously. Continue to monitor vital signs.   -burning sensation of heel, likely 2/2 PVD, osteomyelitis of 2nd right toe- Patient received percocet 3AM. Offered patient tramadol but patient refused. Topical Lidocaine ordered for neuropathic pain.   -management discussed with Dr. Goldberg, PGY 2

## 2017-10-31 DIAGNOSIS — D64.9 ANEMIA, UNSPECIFIED: ICD-10-CM

## 2017-10-31 DIAGNOSIS — K80.50 CALCULUS OF BILE DUCT WITHOUT CHOLANGITIS OR CHOLECYSTITIS WITHOUT OBSTRUCTION: ICD-10-CM

## 2017-10-31 DIAGNOSIS — K80.20 CALCULUS OF GALLBLADDER WITHOUT CHOLECYSTITIS WITHOUT OBSTRUCTION: ICD-10-CM

## 2017-10-31 DIAGNOSIS — K59.00 CONSTIPATION, UNSPECIFIED: ICD-10-CM

## 2017-10-31 DIAGNOSIS — K56.41 FECAL IMPACTION: ICD-10-CM

## 2017-10-31 LAB
ANION GAP SERPL CALC-SCNC: 12 MMOL/L — SIGNIFICANT CHANGE UP (ref 5–17)
BUN SERPL-MCNC: 17 MG/DL — SIGNIFICANT CHANGE UP (ref 7–23)
CALCIUM SERPL-MCNC: 8.3 MG/DL — LOW (ref 8.5–10.1)
CHLORIDE SERPL-SCNC: 107 MMOL/L — SIGNIFICANT CHANGE UP (ref 96–108)
CO2 SERPL-SCNC: 23 MMOL/L — SIGNIFICANT CHANGE UP (ref 22–31)
CREAT SERPL-MCNC: 1 MG/DL — SIGNIFICANT CHANGE UP (ref 0.5–1.3)
FOLATE SERPL-MCNC: >20 NG/ML — SIGNIFICANT CHANGE UP (ref 4.8–24.2)
GLUCOSE SERPL-MCNC: 124 MG/DL — HIGH (ref 70–99)
HCT VFR BLD CALC: 32 % — LOW (ref 34.5–45)
HGB BLD-MCNC: 10 G/DL — LOW (ref 11.5–15.5)
INR BLD: 2.43 RATIO — HIGH (ref 0.88–1.16)
MCHC RBC-ENTMCNC: 29.1 PG — SIGNIFICANT CHANGE UP (ref 27–34)
MCHC RBC-ENTMCNC: 31.2 GM/DL — LOW (ref 32–36)
MCV RBC AUTO: 93.1 FL — SIGNIFICANT CHANGE UP (ref 80–100)
PLATELET # BLD AUTO: 228 K/UL — SIGNIFICANT CHANGE UP (ref 150–400)
POTASSIUM SERPL-MCNC: 2.8 MMOL/L — CRITICAL LOW (ref 3.5–5.3)
POTASSIUM SERPL-MCNC: 2.9 MMOL/L — CRITICAL LOW (ref 3.5–5.3)
POTASSIUM SERPL-MCNC: 3.3 MMOL/L — LOW (ref 3.5–5.3)
POTASSIUM SERPL-SCNC: 2.8 MMOL/L — CRITICAL LOW (ref 3.5–5.3)
POTASSIUM SERPL-SCNC: 2.9 MMOL/L — CRITICAL LOW (ref 3.5–5.3)
POTASSIUM SERPL-SCNC: 3.3 MMOL/L — LOW (ref 3.5–5.3)
PROTHROM AB SERPL-ACNC: 27 SEC — HIGH (ref 9.8–12.7)
RBC # BLD: 3.44 M/UL — LOW (ref 3.8–5.2)
RBC # FLD: 14.3 % — SIGNIFICANT CHANGE UP (ref 10.3–14.5)
SODIUM SERPL-SCNC: 142 MMOL/L — SIGNIFICANT CHANGE UP (ref 135–145)
VIT B12 SERPL-MCNC: 418 PG/ML — SIGNIFICANT CHANGE UP (ref 243–894)
WBC # BLD: 8.5 K/UL — SIGNIFICANT CHANGE UP (ref 3.8–10.5)
WBC # FLD AUTO: 8.5 K/UL — SIGNIFICANT CHANGE UP (ref 3.8–10.5)

## 2017-10-31 PROCEDURE — 93926 LOWER EXTREMITY STUDY: CPT | Mod: 26,RT

## 2017-10-31 PROCEDURE — 74176 CT ABD & PELVIS W/O CONTRAST: CPT | Mod: 26

## 2017-10-31 PROCEDURE — 99232 SBSQ HOSP IP/OBS MODERATE 35: CPT

## 2017-10-31 PROCEDURE — 99233 SBSQ HOSP IP/OBS HIGH 50: CPT | Mod: GC

## 2017-10-31 RX ORDER — POTASSIUM CHLORIDE 20 MEQ
10 PACKET (EA) ORAL
Qty: 0 | Refills: 0 | Status: COMPLETED | OUTPATIENT
Start: 2017-10-31 | End: 2017-10-31

## 2017-10-31 RX ORDER — SENNA PLUS 8.6 MG/1
2 TABLET ORAL AT BEDTIME
Qty: 0 | Refills: 0 | Status: DISCONTINUED | OUTPATIENT
Start: 2017-10-31 | End: 2017-11-05

## 2017-10-31 RX ORDER — DOCUSATE SODIUM 100 MG
100 CAPSULE ORAL THREE TIMES A DAY
Qty: 0 | Refills: 0 | Status: DISCONTINUED | OUTPATIENT
Start: 2017-10-31 | End: 2017-11-05

## 2017-10-31 RX ORDER — SODIUM CHLORIDE 9 MG/ML
500 INJECTION INTRAMUSCULAR; INTRAVENOUS; SUBCUTANEOUS
Qty: 0 | Refills: 0 | Status: COMPLETED | OUTPATIENT
Start: 2017-10-31 | End: 2017-10-31

## 2017-10-31 RX ORDER — POTASSIUM CHLORIDE 20 MEQ
10 PACKET (EA) ORAL
Qty: 0 | Refills: 0 | Status: DISCONTINUED | OUTPATIENT
Start: 2017-10-31 | End: 2017-10-31

## 2017-10-31 RX ORDER — POTASSIUM CHLORIDE 20 MEQ
40 PACKET (EA) ORAL ONCE
Qty: 0 | Refills: 0 | Status: COMPLETED | OUTPATIENT
Start: 2017-10-31 | End: 2017-10-31

## 2017-10-31 RX ORDER — PHYTONADIONE (VIT K1) 5 MG
2.5 TABLET ORAL ONCE
Qty: 0 | Refills: 0 | Status: COMPLETED | OUTPATIENT
Start: 2017-10-31 | End: 2017-10-31

## 2017-10-31 RX ORDER — MORPHINE SULFATE 50 MG/1
2 CAPSULE, EXTENDED RELEASE ORAL ONCE
Qty: 0 | Refills: 0 | Status: DISCONTINUED | OUTPATIENT
Start: 2017-10-31 | End: 2017-10-31

## 2017-10-31 RX ORDER — POLYETHYLENE GLYCOL 3350 17 G/17G
17 POWDER, FOR SOLUTION ORAL DAILY
Qty: 0 | Refills: 0 | Status: DISCONTINUED | OUTPATIENT
Start: 2017-10-31 | End: 2017-11-05

## 2017-10-31 RX ADMIN — SODIUM CHLORIDE 100 MILLILITER(S): 9 INJECTION INTRAMUSCULAR; INTRAVENOUS; SUBCUTANEOUS at 18:14

## 2017-10-31 RX ADMIN — PIPERACILLIN AND TAZOBACTAM 25 GRAM(S): 4; .5 INJECTION, POWDER, LYOPHILIZED, FOR SOLUTION INTRAVENOUS at 23:53

## 2017-10-31 RX ADMIN — Medication 100 MILLIEQUIVALENT(S): at 16:08

## 2017-10-31 RX ADMIN — SENNA PLUS 2 TABLET(S): 8.6 TABLET ORAL at 23:54

## 2017-10-31 RX ADMIN — PIPERACILLIN AND TAZOBACTAM 25 GRAM(S): 4; .5 INJECTION, POWDER, LYOPHILIZED, FOR SOLUTION INTRAVENOUS at 06:03

## 2017-10-31 RX ADMIN — Medication 100 MILLIGRAM(S): at 05:58

## 2017-10-31 RX ADMIN — Medication 100 MILLIGRAM(S): at 23:48

## 2017-10-31 RX ADMIN — PANTOPRAZOLE SODIUM 40 MILLIGRAM(S): 20 TABLET, DELAYED RELEASE ORAL at 17:21

## 2017-10-31 RX ADMIN — PIPERACILLIN AND TAZOBACTAM 25 GRAM(S): 4; .5 INJECTION, POWDER, LYOPHILIZED, FOR SOLUTION INTRAVENOUS at 18:44

## 2017-10-31 RX ADMIN — GABAPENTIN 100 MILLIGRAM(S): 400 CAPSULE ORAL at 05:58

## 2017-10-31 RX ADMIN — Medication 100 MILLIGRAM(S): at 17:21

## 2017-10-31 RX ADMIN — GABAPENTIN 100 MILLIGRAM(S): 400 CAPSULE ORAL at 23:48

## 2017-10-31 RX ADMIN — MORPHINE SULFATE 2 MILLIGRAM(S): 50 CAPSULE, EXTENDED RELEASE ORAL at 10:06

## 2017-10-31 RX ADMIN — Medication 100 MILLIEQUIVALENT(S): at 12:52

## 2017-10-31 RX ADMIN — PANTOPRAZOLE SODIUM 40 MILLIGRAM(S): 20 TABLET, DELAYED RELEASE ORAL at 05:58

## 2017-10-31 RX ADMIN — Medication 2.5 MILLIGRAM(S): at 16:28

## 2017-10-31 RX ADMIN — ATORVASTATIN CALCIUM 40 MILLIGRAM(S): 80 TABLET, FILM COATED ORAL at 23:48

## 2017-10-31 RX ADMIN — LOSARTAN POTASSIUM 50 MILLIGRAM(S): 100 TABLET, FILM COATED ORAL at 05:58

## 2017-10-31 RX ADMIN — Medication 100 MILLIEQUIVALENT(S): at 15:03

## 2017-10-31 RX ADMIN — Medication 40 MILLIEQUIVALENT(S): at 21:59

## 2017-10-31 RX ADMIN — Medication 166.67 MILLIGRAM(S): at 17:10

## 2017-10-31 RX ADMIN — MORPHINE SULFATE 2 MILLIGRAM(S): 50 CAPSULE, EXTENDED RELEASE ORAL at 09:58

## 2017-10-31 NOTE — PROGRESS NOTE ADULT - SUBJECTIVE AND OBJECTIVE BOX
82F PMHx Afib on Coumadin, HLD, CVA, MVP, OA, HTN, PVD (s/p RLE bypassx3, most recently 2012), Neuropathy, GERD presents with pain and swelling to right second toe a/w RLE cellulitis, r/o osteomyelitis. Found to have symptomatic anemia from acute blood loss, suspect GI source    HPI:  Patient had no issues overnight. She has been in A.fib -120s since 8Am today. She was seen today AM at the bedside. She states that she had abdominal pain that started last night. She states that she has not had a bowel movement in the past three days. Pain is located in the b/l lower abdominal quadrants. She denies any fever, nausea, vomiting, sob, chest pain or palptiations.      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)  Osteoarthritis (715.90)  Afib (427.31)  PVD (peripheral vascular disease) (443.9)  Neuropathy (355.9): (Right lower leg)  Gout (274.9)  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  CVA (cerebral vascular accident) (434.91): (&quot;Mini-stroke&quot;,1990&#x27;s)  Rheumatoid arthritis (714.0)  Asthma (493.90)  Hyperlipidemia (272.4)  Hypertension (401.9)  S/P cataract surgery (Z98.49): (Left eye)  Elective surgery (Z41.9): (&quot;Twisted bowel&quot;, 2014)  Elective surgery (Z41.9): (Exision of cyst on liver, 1985)  S/P ORIF (open reduction internal fixation) fracture (V45.89): Left hip fx (2012) &amp; R hip fx (2013)  H/O cerebral aneurysm repair (V45.89): Brain clips (1978(  Renal stone (592.0): Cysto stent placement 10/1/2014  PVD (peripheral vascular disease) (443.9): s/p RLE bypass x 3, most recent 3/2012 Right external iliac to PT bypass w/ PTFE (2012)  S/P FRED (total abdominal hysterectomy) (V88.01): (1987, Hx of &quot;ovarian cancer?&quot;)        REVIEW OF SYSTEMS:  CONSTITUTIONAL: denies fever, chills, fatigue, weakness  HEENT: denies blurred vision, sore throat  SKIN: denies new lesions, rash  CARDIOVASCULAR: denies chest pain, chest pressure, palpitations  RESPIRATORY: denies shortness of breath, sputum production  GASTROINTESTINAL: admits abdominal pain as above and constipation denies nausea, vomiting, diarrhea, dyschezia  GENITOURINARY: denies dysuria, discharge  NEUROLOGICAL: denies numbness, headache, focal weakness  MUSCULOSKELETAL: denies new joint pain, muscle aches  PSYCH: denies anxiety/depression at this time      PHYSICAL EXAM:  GENERAL: NAD,   HEAD:  Atraumatic, Normocephalic  EYES: EOMI, fixed dilated pupil on the left  NERVOUS SYSTEM:  Alert & Oriented X3, poor concentration; Motor Strength 5/5 B/L upper and lower extremities;   CHEST/LUNG: Clear to percussion bilaterally; No rales, rhonchi, wheezing, or rubs  HEART: irregular Regular rate, tachycardic; No murmurs, rubs, or gallops  ABDOMEN: Soft, tender to palpation in the b/l lower quadrants. no rebound, or rigidity  EXTREMITIES:  trace Peripheral Pulses in bilateral lower extremities, b/l feet are warm, tender to palpation on the right foot at the toe    SKIN: right foot, 2nd toe lesion blackened with necrotic appearance, trace erythema distal to surgical pen marking improved,       MEDICATIONS  (STANDING):  amLODIPine   Tablet 5 milliGRAM(s) Oral at bedtime  atorvastatin 40 milliGRAM(s) Oral at bedtime  gabapentin 100 milliGRAM(s) Oral three times a day  losartan 50 milliGRAM(s) Oral daily  metoprolol     tartrate 100 milliGRAM(s) Oral two times a day  multivitamin 1 Tablet(s) Oral daily  pantoprazole  Injectable 40 milliGRAM(s) IV Push every 12 hours  piperacillin/tazobactam IVPB. 3.375 Gram(s) IV Intermittent every 8 hours  vancomycin  IVPB 1250 milliGRAM(s) IV Intermittent every 24 hours    MEDICATIONS  (PRN):  acetaminophen   Tablet. 650 milliGRAM(s) Oral every 6 hours PRN Mild Pain (1 - 3)  lidocaine 5% Ointment 1 Application(s) Topical two times a day PRN neuropathic pain  oxyCODONE    5 mG/acetaminophen 325 mG 2 Tablet(s) Oral every 6 hours PRN Severe Pain (7 - 10)  traMADol 50 milliGRAM(s) Oral every 8 hours PRN Moderate Pain (4 - 6)      LABS:                        10.0   8.5   )-----------( 228      ( 31 Oct 2017 06:06 )             32.0     10-31    142  |  107  |  17  ----------------------------<  124<H>  3.3<L>   |  23  |  1.00    Ca    8.3<L>      31 Oct 2017 06:06    TPro  7.0  /  Alb  2.7<L>  /  TBili  1.0  /  DBili  .30<H>  /  AST  29  /  ALT  26  /  AlkPhos  92  10-31    PT/INR - ( 31 Oct 2017 06:06 )   PT: 27.0 sec;   INR: 2.43 ratio             CAPILLARY BLOOD GLUCOSE          10-28 @ 23:02   No growth  --  --  10-28 @ 16:48   No growth to date.  --  --          RADIOLOGY & ADDITIONAL TESTS:    Imaging Personally Reviewed:       Advance Directives:      Palliative Care: 82F PMHx Afib on Coumadin, HLD, CVA, MVP, OA, HTN, PVD (s/p RLE bypassx3, most recently 2012), Neuropathy, GERD presents with pain and swelling to right second toe a/w RLE cellulitis, r/o osteomyelitis. Found to have symptomatic anemia from acute blood loss, suspect GI source    HPI:  Patient had no acute issues overnight. She has been in A.fib -120s since 8Am today. She was seen today AM at the bedside. She has had interval developement of abdominal pain that started last night. She states that she has not had a bowel movement in the past three days. Pain is located in the b/l lower abdominal quadrants. This developed after finishing half the prep for colonoscopy. She denies any fever, nausea, vomiting, sob, chest pain or palptiations.    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)  Osteoarthritis (715.90)  Afib (427.31)  PVD (peripheral vascular disease) (443.9)  Neuropathy (355.9): (Right lower leg)  Gout (274.9)  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  CVA (cerebral vascular accident) (434.91): (&quot;Mini-stroke&quot;,1990&#x27;s)  Rheumatoid arthritis (714.0)  Asthma (493.90)  Hyperlipidemia (272.4)  Hypertension (401.9)  S/P cataract surgery (Z98.49): (Left eye)  Elective surgery (Z41.9): (&quot;Twisted bowel&quot;, 2014)  Elective surgery (Z41.9): (Exision of cyst on liver, 1985)  S/P ORIF (open reduction internal fixation) fracture (V45.89): Left hip fx (2012) &amp; R hip fx (2013)  H/O cerebral aneurysm repair (V45.89): Brain clips (1978(  Renal stone (592.0): Cysto stent placement 10/1/2014  PVD (peripheral vascular disease) (443.9): s/p RLE bypass x 3, most recent 3/2012 Right external iliac to PT bypass w/ PTFE (2012)  S/P FRED (total abdominal hysterectomy) (V88.01): (1987, Hx of &quot;ovarian cancer?&quot;)    REVIEW OF SYSTEMS:  CONSTITUTIONAL: denies fever, chills, fatigue, weakness  HEENT: denies blurred vision, sore throat  SKIN: denies new lesions, rash  CARDIOVASCULAR: denies chest pain, chest pressure, palpitations  RESPIRATORY: denies shortness of breath, sputum production  GASTROINTESTINAL: admits abdominal pain as above and constipation denies nausea, vomiting, diarrhea, dyschezia  GENITOURINARY: denies dysuria, discharge  NEUROLOGICAL: denies numbness, headache, focal weakness  MUSCULOSKELETAL: denies new joint pain, muscle aches  PSYCH: denies anxiety/depression at this time    PHYSICAL EXAM:  GENERAL: NAD,   HEAD:  Atraumatic, Normocephalic  EYES: EOMI, fixed dilated pupil on the left  NERVOUS SYSTEM:  Alert & Oriented X3, poor concentration; Motor Strength 5/5 B/L upper and lower extremities;   CHEST/LUNG: Clear to percussion bilaterally; No rales, rhonchi, wheezing, or rubs  HEART: irregular Regular rate, tachycardic; No murmurs, rubs, or gallops  ABDOMEN: Soft, tender to palpation in the b/l lower quadrants. no rebound, or rigidity  EXTREMITIES:  trace Peripheral Pulses in bilateral lower extremities, b/l feet are warm, tender to palpation on the right foot at the toe    SKIN: right foot, 2nd toe lesion blackened with necrotic appearance, trace erythema distal to surgical pen marking improved,     MEDICATIONS  (STANDING):  amLODIPine   Tablet 5 milliGRAM(s) Oral at bedtime  atorvastatin 40 milliGRAM(s) Oral at bedtime  gabapentin 100 milliGRAM(s) Oral three times a day  losartan 50 milliGRAM(s) Oral daily  metoprolol     tartrate 100 milliGRAM(s) Oral two times a day  multivitamin 1 Tablet(s) Oral daily  pantoprazole  Injectable 40 milliGRAM(s) IV Push every 12 hours  piperacillin/tazobactam IVPB. 3.375 Gram(s) IV Intermittent every 8 hours  vancomycin  IVPB 1250 milliGRAM(s) IV Intermittent every 24 hours    MEDICATIONS  (PRN):  acetaminophen   Tablet. 650 milliGRAM(s) Oral every 6 hours PRN Mild Pain (1 - 3)  lidocaine 5% Ointment 1 Application(s) Topical two times a day PRN neuropathic pain  oxyCODONE    5 mG/acetaminophen 325 mG 2 Tablet(s) Oral every 6 hours PRN Severe Pain (7 - 10)  traMADol 50 milliGRAM(s) Oral every 8 hours PRN Moderate Pain (4 - 6)      LABS:                        10.0   8.5   )-----------( 228      ( 31 Oct 2017 06:06 )             32.0     10-31    142  |  107  |  17  ----------------------------<  124<H>  3.3<L>   |  23  |  1.00    Ca    8.3<L>      31 Oct 2017 06:06    TPro  7.0  /  Alb  2.7<L>  /  TBili  1.0  /  DBili  .30<H>  /  AST  29  /  ALT  26  /  AlkPhos  92  10-31    PT/INR - ( 31 Oct 2017 06:06 )   PT: 27.0 sec;   INR: 2.43 ratio             CAPILLARY BLOOD GLUCOSE          10-28 @ 23:02   No growth  --  --  10-28 @ 16:48   No growth to date.  --  --          RADIOLOGY & ADDITIONAL TESTS:    Imaging Personally Reviewed:       Advance Directives:      Palliative Care:

## 2017-10-31 NOTE — PROGRESS NOTE ADULT - PROBLEM SELECTOR PLAN 3
SMOG enema x 1 ordered  senna 2 tabs at bedtime, colace 100 mg tid, miralax 17 g daily  monitor for BMs

## 2017-10-31 NOTE — PROGRESS NOTE ADULT - PROBLEM SELECTOR PLAN 3
fe studies consistent with chronic disesase related to osteomyelitis but with history of coumadin use to have GI evaluation  transfuse as clinically indicated.

## 2017-10-31 NOTE — PROGRESS NOTE ADULT - SUBJECTIVE AND OBJECTIVE BOX
[facility]    Dahiana Bañuelos, Evens, Toney, Terrie, Truman, Filiberto      [phone]    CHIEF COMPLAINT: Patient is a 82y old  Female who presents with a chief complaint of weakness and pain both legs (28 Oct 2017 20:23)      HPI:  82F PMHx Afib on Coumadin, HLD, CVA, MVP, OA, HTN, PVD (s/p RLE bypassx3, most recently 2012), Neuropathy, GERD presents with pain and swelling to right second toe that started 1 month ago. Patient states foot became red, painful, and a small wound with a scab started forming on the top portion of her second toe. It became extremely difficult to bear weight on the foot so she went to see Dr. Isaacs at the wound center and he drained the wound at which point, yellow pus drained from the wound. He prescribed her an antibiotic (she does not recall which one). She denies any fevers, chills, chesst pain or shortness of breat. Admits to joint pain due to her RA.     In ED, pt's vital was wnl. WBC 8.2, Hct: 26.1, lactate 0.8, , CMP revealed elevated bilirubin of 1.5, remainder is wnl , INR: 2.26. CXR showed no acute pathology. X ray of Rt foot showed possible small periarticular erosion medial aspect head of the proximal phalanx which might represent infection/ osteomyelitis. Pt received 1 dose  of  vanco and 1 dose of zosyn and 1 bolus of NS, BCX, UCX was sent in  ED. (28 Oct 2017 13:42)    Interim history: Patient seen and examined at bedside.  Reports burning sensation in R foot, rated 10/10. Denies chest pain, SOB, dyspnea, orthopnea, palpitations. Plan for EGD/colonoscopy today.     Telemetry: NSR 80s      MEDICATIONS  (STANDING):  amLODIPine   Tablet 5 milliGRAM(s) Oral at bedtime  atorvastatin 40 milliGRAM(s) Oral at bedtime  gabapentin 100 milliGRAM(s) Oral three times a day  losartan 50 milliGRAM(s) Oral daily  metoprolol     tartrate 100 milliGRAM(s) Oral two times a day  multivitamin 1 Tablet(s) Oral daily  pantoprazole  Injectable 40 milliGRAM(s) IV Push every 12 hours  piperacillin/tazobactam IVPB. 3.375 Gram(s) IV Intermittent every 8 hours  potassium chloride  10 mEq/100 mL IVPB 10 milliEquivalent(s) IV Intermittent every 1 hour  vancomycin  IVPB 1250 milliGRAM(s) IV Intermittent every 24 hours    MEDICATIONS  (PRN):  acetaminophen   Tablet. 650 milliGRAM(s) Oral every 6 hours PRN Mild Pain (1 - 3)  lidocaine 5% Ointment 1 Application(s) Topical two times a day PRN neuropathic pain  oxyCODONE    5 mG/acetaminophen 325 mG 2 Tablet(s) Oral every 6 hours PRN Severe Pain (7 - 10)  traMADol 50 milliGRAM(s) Oral every 8 hours PRN Moderate Pain (4 - 6)      REVIEW OF SYSTEMS:  eye, ent, GI, , allergic, dermatologic, musculoskeletal and neurologic are negative except as described above    Vital Signs Last 24 Hrs  T(C): 37 (31 Oct 2017 05:13), Max: 37.3 (30 Oct 2017 13:17)  T(F): 98.6 (31 Oct 2017 05:13), Max: 99.2 (30 Oct 2017 13:17)  HR: 94 (31 Oct 2017 05:13) (72 - 94)  BP: 139/83 (31 Oct 2017 05:13) (100/59 - 139/83)  BP(mean): --  RR: 16 (31 Oct 2017 05:13) (16 - 17)  SpO2: 94% (31 Oct 2017 05:13) (91% - 94%)    I&O's Summary    30 Oct 2017 07:01  -  31 Oct 2017 07:00  --------------------------------------------------------  IN: 590 mL / OUT: 0 mL / NET: 590 mL        PHYSICAL EXAM:    Constitutional: elderly female in NAD, appears stressed about foot pain  Pulmonary: Non-labored, breath sounds are clear bilaterally, No wheezing, rales or rhonchi  Cardiovascular: Regular, S1 and S2.  No murmur.  No rubs, gallops or clicks  Gastrointestinal: Bowel Sounds present, soft, nontender.   Lymph: No peripheral edema.   Neurological: Alert, no focal deficits  Skin: R foot wrapped, peripheral pulses difficult to assess  Psych:  Mood & affect appropriate    LABS: All Labs Reviewed:                        10.0   8.5   )-----------( 228      ( 31 Oct 2017 06:06 )             32.0                         9.1    x     )-----------( x        ( 30 Oct 2017 13:59 )             29.9                         8.3    7.8   )-----------( 175      ( 30 Oct 2017 07:19 )             26.6     31 Oct 2017 06:06    142    |  107    |  17     ----------------------------<  124    3.3     |  23     |  1.00   30 Oct 2017 07:19    140    |  108    |  20     ----------------------------<  92     3.6     |  26     |  1.20   29 Oct 2017 06:45    142    |  109    |  23     ----------------------------<  93     3.6     |  25     |  1.10     Ca    8.3        31 Oct 2017 06:06  Ca    7.6        30 Oct 2017 07:19  Ca    7.8        29 Oct 2017 06:45    TPro  7.0    /  Alb  3.0    /  TBili  1.5    /  DBili  x      /  AST  18     /  ALT  16     /  AlkPhos  73     28 Oct 2017 13:40    PT/INR - ( 31 Oct 2017 06:06 )   PT: 27.0 sec;   INR: 2.43 ratio               Blood Culture: Organism --  Gram Stain Blood -- Gram Stain --  Specimen Source .Urine Clean Catch (Midstream)  Culture-Blood --    Organism --  Gram Stain Blood -- Gram Stain --  Specimen Source .Blood Blood-Peripheral  Culture-Blood --            RADIOLOGY:    EKG:    Echo:

## 2017-10-31 NOTE — PROGRESS NOTE ADULT - PROBLEM SELECTOR PLAN 10
DVT ppx w/ ICD's (therapeutic INR at this time) hold AC for evaluation of anemia  GI ppx w/ PPI bid as per GI recs

## 2017-10-31 NOTE — PROGRESS NOTE ADULT - SUBJECTIVE AND OBJECTIVE BOX
CARITO CONSTANTINO is a 82yFemale , patient examined and chart reviewed. Patient seen and examined today being followed for infected gangrenous right 2nd toe       INTERVAL HPI/ OVERNIGHT EVENTS: Events noted, CTA LE shows multilevel occlusive disease R>>L , with occluded bypass grafts on right LE. Vascular follow up noted, for angiogram of right LE today . 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  Osteoarthritis  Afib  PVD (peripheral vascular disease)  Neuropathy: (Right lower leg)  H/O cerebral aneurysm repair: brain clips  CVA (cerebral vascular accident): (&quot;Mini-stroke&quot;,1990&#x27;s)  Rheumatoid arthritis  Hyperlipidemia  Hypertension  S/P cataract surgery: (Left eye)  Elective surgery: (&quot;Twisted bowel&quot;, 2014)  Elective surgery: (Exision of cyst on liver, 1985)  S/P ORIF (open reduction internal fixation) fracture: Left hip fx (2012) &amp; R hip fx (2013)  H/O cerebral aneurysm repair: Brain clips (1978(  Renal stone: Cysto stent placement 10/1/2014  PVD (peripheral vascular disease): s/p RLE bypass x 3, most recent 3/2012 Right external iliac to PT bypass w/ PTFE (2012)  S/P FRED (total abdominal hysterectomy): (1987, Hx of &quot;ovarian cancer?&quot;)      For details regarding the patient's social history, family history, and other miscellaneous elements, please refer the initial infectious diseases consultation and/or the admitting history and physical examination for this admission.    ROS:  CONSTITUTIONAL:  Negative fever or chills, feels well, good appetite  EYES:  Negative  blurry vision or double vision  CARDIOVASCULAR:  Negative for chest pain or palpitations  RESPIRATORY:  Negative for cough, wheezing, or SOB   GASTROINTESTINAL:  Negative for nausea, vomiting, diarrhea, constipation, or abdominal pain  GENITOURINARY:  Negative frequency, urgency or dysuria  NEUROLOGIC:  No headache, confusion, dizziness, lightheadedness  All other systems were reviewed and are negative         Current inpatient medications :    ANTIBIOTICS/RELEVANT:  piperacillin/tazobactam IVPB. 3.375 Gram(s) IV Intermittent every 8 hours  sorbitol 70%/mineral oil/magnesium hydroxide/glycerin Enema 120 milliLiter(s) Rectal once  vancomycin  IVPB 1250 milliGRAM(s) IV Intermittent every 24 hours      acetaminophen   Tablet. 650 milliGRAM(s) Oral every 6 hours PRN  amLODIPine   Tablet 5 milliGRAM(s) Oral at bedtime  atorvastatin 40 milliGRAM(s) Oral at bedtime  gabapentin 100 milliGRAM(s) Oral three times a day  lidocaine 5% Ointment 1 Application(s) Topical two times a day PRN  losartan 50 milliGRAM(s) Oral daily  metoprolol     tartrate 100 milliGRAM(s) Oral two times a day  multivitamin 1 Tablet(s) Oral daily  oxyCODONE    5 mG/acetaminophen 325 mG 2 Tablet(s) Oral every 6 hours PRN  pantoprazole  Injectable 40 milliGRAM(s) IV Push every 12 hours  potassium chloride  10 mEq/100 mL IVPB 10 milliEquivalent(s) IV Intermittent every 1 hour  traMADol 50 milliGRAM(s) Oral every 8 hours PRN      Objective:    10-30 @ 07:01  -  10-31 @ 07:00  --------------------------------------------------------  IN: 590 mL / OUT: 0 mL / NET: 590 mL      T(C): 37 (10-31-17 @ 05:13), Max: 37.3 (10-30-17 @ 13:17)  HR: 94 (10-31-17 @ 05:13) (72 - 94)  BP: 139/83 (10-31-17 @ 05:13) (100/59 - 139/83)  RR: 16 (10-31-17 @ 05:13) (16 - 17)  SpO2: 94% (10-31-17 @ 05:13) (91% - 94%)  Wt(kg): --      Physical Exam:  General: well developed well nourished, in no acute distress  Eyes: sclera anicteric, pupils equal and reactive to light  ENMT: buccal mucosa moist, pharynx not injected  Neck: supple, trachea midline  Lungs: clear, no wheeze/rhonchi  Cardiovascular: regular rate and rhythm, S1 S2  Abdomen: soft, nontender, no organomegaly present, bowel sounds normal  Neurological: alert and oriented x3, Cranial Nerves II-XII grossly intact  Skin: no increased ecchymosis/petechiae/purpura  Lymph Nodes: no palpable cervical/supraclavicular lymph nodes enlargements  Extremities: no cyanosis/clubbing, no change n right foot             LABS:                          10.0   8.5   )-----------( 228      ( 31 Oct 2017 06:06 )             32.0       10-31    142  |  107  |  17  ----------------------------<  124<H>  3.3<L>   |  23  |  1.00    Ca    8.3<L>      31 Oct 2017 06:06    TPro  7.0  /  Alb  2.7<L>  /  TBili  1.0  /  DBili  .30<H>  /  AST  29  /  ALT  26  /  AlkPhos  92  10-31      PT/INR - ( 31 Oct 2017 06:06 )   PT: 27.0 sec;   INR: 2.43 ratio             Vancomycin Level, Trough: 10.7 ug/mL (10-30 @ 12:52)    Sedimentation Rate, Erythrocyte (10.28.17 @ 13:40)    Sedimentation Rate, Erythrocyte: 119 mm/hr    C-Reactive Protein, Serum (10.28.17 @ 23:01)    C-Reactive Protein, Serum: 10.50 mg/dL          RECENT CULTURES:    Culture - Urine (collected 28 Oct 2017 23:02)  Source: .Urine Clean Catch (Midstream)  Final Report (29 Oct 2017 23:21):    No growth    Culture - Blood (collected 28 Oct 2017 16:48)  Source: .Blood Blood-Peripheral  Preliminary Report (29 Oct 2017 17:01):    No growth to date.    Culture - Blood (collected 28 Oct 2017 16:48)  Source: .Blood Blood-Peripheral  Preliminary Report (29 Oct 2017 17:01):    No growth to date.                    RADIOLOGY & ADDITIONAL STUDIES:   CT Angio Abdomen and Pelvis w/ IV Cont (10.30.17 @ 18:33) >    IMPRESSION:     AORTOILIAC INFLOW: 3.7 cm fusiform infrarenal abdominal aortic aneurysm.   No evidence of aortoiliac occlusive disease.    RIGHT LEG: Complete occlusion of the native femoral-popliteal arteries. 3   right external iliac/common femoral-distal bypass grafts are completely   occluded. Three-vessel runoff to the foot is severely attenuated but   patent. Multifocal short segment posterior tibial artery occlusions.   Distal segment supplied by peroneal collaterals. DP is severely   attenuated but patent. Plantar is patent.    LEFT LEG: Patent femoral-popliteal arteries with moderate to severe   multifocal disease. Three-vessel runoff to the foot is severely   attenuated but patent. DP and plantar are patent.    OTHER: 14 mm upper common bile duct calculus without significant biliary   dilatation. Correlation with LFTs is recommended.      Assessment :  Assessment :   82 y.o. female with multiple medical problems Afib on Coumadin, HLD, CVA, MVP, OA, HTN, PVD (s/p RLE bypassx3, most recently 2012), Neuropathy, GERD  presents with  ischemic right foot and leg coupled with gangrene of right 2nd. toe.  She has multiple failed bypasses of right leg. 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     She is scheduled for angiogram of right leg as recommended by vascular surgery , podiatry still feels the gangrenous toe should be amputated and then try HBOT     Plan :   - continue with Iv antibiotics for now , check Vanco trough before 3 rd dose   - follow CTA of the leg, may need vascular intervention before digit intervention   - trend renal function and CBC  - will discuss with podiatry     Continue with present regime .  Appropriate use of antibiotics and adverse effects reviewed.    I have discussed the above plan of care with patient and her daughter in detail. They expressed understanding of the  treatment plan . Risks, benefits and alternatives discussed in detail. I have asked if they have any questions or concerns and appropriately addressed them to the best of my ability .    > 25 minutes were spent in direct patient care reviewing notes, medications ,labs data/ imaging , discussion with multidisciplinary team.    Thank you for allowing me to participate in care of your patient .    Judi Coyne MD  [phone]

## 2017-10-31 NOTE — PROVIDER CONTACT NOTE (OTHER) - REASON
Pt has only received 1 potassium rider this shift thus far pt taken off unit numerous times for Ct and Endo

## 2017-10-31 NOTE — PROGRESS NOTE ADULT - ASSESSMENT
82F PMHx Afib on Coumadin, HLD, CVA, MVP, OA, HTN, PVD (s/p RLE bypassx3, most recently 2012), Neuropathy, GERD presents with pain and swelling to right second toe a/w RLE cellulitis, r/o osteomyelitis. Found to have symptomatic anemia from acute blood loss, suspect GI source.    -no evidence of acute ischemia or meaningful volume overload  -Currently no active cardiac conditions. No signs of ischemia, ADHF, clinical exam not consistent with stenotic valvular disease, no unstable arrhythmias noted. Therefore able to proceed with low risk endoscopic surgery without further cardiac workup. Routine hemodynamic monitoring is suggested during procedure.   -suspected GI bleed, hold coumadin temporarily, therapeutic INR, follow up daily INR level  -Continue to trend H/H  -Last TTE: shows normal LV function  -Abx per primary team  -Continue amlodipine 5 QD  -Continue losartan 50 qd  -Continue statin drug  -Continue metoprolol 100 BID  -To follow closely with you   82F PMHx Afib on Coumadin, HLD, CVA, MVP, OA, HTN, PVD (s/p RLE bypassx3, most recently 2012), Neuropathy, GERD presents with pain and swelling to right second toe a/w RLE cellulitis, r/o osteomyelitis. Found to have symptomatic anemia from acute blood loss, suspect GI source.    -no evidence of acute ischemia or meaningful volume overload  -suspected GI bleed, hold coumadin, therapeutic INR, follow up daily INR level  -s/p 1 unit pRBCs with appropriate increase in H/H  -Last TTE:  - 82F PMHx Afib on Coumadin, HLD, CVA, MVP, OA, HTN, PVD (s/p RLE bypassx3, most recently 2012), Neuropathy, GERD presents with pain and swelling to right second toe a/w RLE cellulitis, r/o osteomyelitis. Found to have symptomatic anemia from acute blood loss, suspect GI source.    -no evidence of acute ischemia or meaningful volume overload  -Currently no active cardiac conditions. No signs of ischemia, ADHF, clinical exam not consistent with stenotic valvular disease, no unstable arrhythmias noted. Optimal from a cardiovascular condition to proceed with low risk endoscopic procedure. Routine hemodynamic monitoring is suggested during procedure.   -suspected GI bleed, hold coumadin temporarily, therapeutic INR, follow up daily INR level  -Continue to trend H/H  -Last TTE: shows normal LV function  -Abx per primary team  -Continue amlodipine 5 QD  -Continue losartan 50 qd  -Continue statin drug  -Continue metoprolol 100 BID  -To follow closely with you   82F PMHx Afib on Coumadin, HLD, CVA, MVP, OA, HTN, PVD (s/p RLE bypassx3, most recently 2012), Neuropathy, GERD presents with pain and swelling to right second toe a/w RLE cellulitis, r/o osteomyelitis. Found to have symptomatic anemia from acute blood loss, suspect GI source.    -no evidence of acute ischemia or meaningful volume overload  -suspected GI bleed, hold coumadin, therapeutic INR, follow up daily INR level  -s/p 1 unit pRBCs with appropriate increase in H/H  -Last TTE:  -

## 2017-10-31 NOTE — PROGRESS NOTE ADULT - PROBLEM SELECTOR PLAN 4
likely source of abdominal pain  CT abdomen showing: Mild small and large bowel dilatation has developed with air-fluid levels suggesting a diarrheal state. Moderate fecal retention in the rectum with evidence of stercoral proctitis. 14 mm common hepatic duct calculus with mild biliary dilatation.   administered smog enema with improvement of symptoms  start senna 2 tabs at bedtime, colace 100 tid and miralax 17 g daily

## 2017-10-31 NOTE — PROGRESS NOTE ADULT - SUBJECTIVE AND OBJECTIVE BOX
Interval History:  complains of pain right foot  is scheduled for EGD today  Chart reviewed and events noted;   Overnight events:    MEDICATIONS  (STANDING):  amLODIPine   Tablet 5 milliGRAM(s) Oral at bedtime  atorvastatin 40 milliGRAM(s) Oral at bedtime  gabapentin 100 milliGRAM(s) Oral three times a day  losartan 50 milliGRAM(s) Oral daily  metoprolol     tartrate 100 milliGRAM(s) Oral two times a day  multivitamin 1 Tablet(s) Oral daily  pantoprazole  Injectable 40 milliGRAM(s) IV Push every 12 hours  piperacillin/tazobactam IVPB. 3.375 Gram(s) IV Intermittent every 8 hours  potassium chloride  10 mEq/100 mL IVPB 10 milliEquivalent(s) IV Intermittent every 1 hour  vancomycin  IVPB 1250 milliGRAM(s) IV Intermittent every 24 hours    MEDICATIONS  (PRN):  acetaminophen   Tablet. 650 milliGRAM(s) Oral every 6 hours PRN Mild Pain (1 - 3)  lidocaine 5% Ointment 1 Application(s) Topical two times a day PRN neuropathic pain  morphine  - Injectable 2 milliGRAM(s) IV Push once PRN Severe Pain (7 - 10)  oxyCODONE    5 mG/acetaminophen 325 mG 2 Tablet(s) Oral every 6 hours PRN Severe Pain (7 - 10)  traMADol 50 milliGRAM(s) Oral every 8 hours PRN Moderate Pain (4 - 6)      Vital Signs Last 24 Hrs  T(C): 37 (31 Oct 2017 05:13), Max: 37.3 (30 Oct 2017 13:17)  T(F): 98.6 (31 Oct 2017 05:13), Max: 99.2 (30 Oct 2017 13:17)  HR: 94 (31 Oct 2017 05:13) (72 - 94)  BP: 139/83 (31 Oct 2017 05:13) (100/59 - 139/83)  BP(mean): --  RR: 16 (31 Oct 2017 05:13) (16 - 17)  SpO2: 94% (31 Oct 2017 05:13) (91% - 94%)    PHYSICAL EXAM  General: adult in NAD  HEENT: clear oropharynx, anicteric sclera, pink conjunctivae  Neck: supple  CV: normal S1S2 with no murmur rubs or gallops  Lungs: clear to auscultation, no wheezes, no rhales  Abdomen: soft non-tender non-distended, no hepato/splenomegaly  Ext: right foot second toe with swelling erythema and area of necrosis  Skin: no rashes and no petichiae  Neuro: alert and oriented X3 no focal deficits      LABS:  CBC Full  -  ( 31 Oct 2017 06:06 )  WBC Count : 8.5 K/uL  Hemoglobin : 10.0 g/dL  Hematocrit : 32.0 %  Platelet Count - Automated : 228 K/uL  Mean Cell Volume : 93.1 fl  Mean Cell Hemoglobin : 29.1 pg  Mean Cell Hemoglobin Concentration : 31.2 gm/dL  Auto Neutrophil # : x  Auto Lymphocyte # : x  Auto Monocyte # : x  Auto Eosinophil # : x  Auto Basophil # : x  Auto Neutrophil % : x  Auto Lymphocyte % : x  Auto Monocyte % : x  Auto Eosinophil % : x  Auto Basophil % : x    10-31    142  |  107  |  17  ----------------------------<  124<H>  3.3<L>   |  23  |  1.00    Ca    8.3<L>      31 Oct 2017 06:06      PT/INR - ( 31 Oct 2017 06:06 )   PT: 27.0 sec;   INR: 2.43 ratio             fe studies  Ferritin, Serum: 236.0 ng/mL (10-29 @ 11:05)  Iron - Total Binding Capacity.: 142 ug/dL (10-29 @ 11:05)      WBC trend  8.5 K/uL (10-31-17 @ 06:06)  7.8 K/uL (10-30-17 @ 07:19)  8.0 K/uL (10-29-17 @ 23:53)  7.9 K/uL (10-29-17 @ 06:45)  12.2 K/uL (10-28-17 @ 13:40)      Hgb trend  10.0 g/dL (10-31-17 @ 06:06)  9.1 g/dL (10-30-17 @ 13:59)  8.3 g/dL (10-30-17 @ 07:19)  8.6 g/dL (10-29-17 @ 23:53)  7.2 g/dL (10-29-17 @ 06:45)  8.2 g/dL (10-28-17 @ 13:40)      plt trend  228 K/uL (10-31-17 @ 06:06)  175 K/uL (10-30-17 @ 07:19)  178 K/uL (10-29-17 @ 23:53)  165 K/uL (10-29-17 @ 06:45)  176 K/uL (10-28-17 @ 13:40)        RADIOLOGY & ADDITIONAL STUDIES:

## 2017-10-31 NOTE — PROGRESS NOTE ADULT - PROBLEM SELECTOR PLAN 4
(+) iron deficiency + history of AC  will need EGD/colonoscopy once optimized (+) iron deficiency + history of AC and personal history of PUD  continue protonix 40 mg BID  will need EGD/colonoscopy once optimized

## 2017-10-31 NOTE — PROGRESS NOTE ADULT - PROBLEM SELECTOR PLAN 7
CT angiography showing multiple sites of occlusion in b/l lower extremities  f/u vascular surgery (Dr. Sánchez): Recommends holding digital amputation on right lower extremity at this time due to poor healing capability from poor blood flow. considering IR angiogram

## 2017-10-31 NOTE — PROGRESS NOTE ADULT - PROBLEM SELECTOR PLAN 1
possibly 2/2 to GI bleed given history of bleeding GI ulcer   improving. continue to monitor H/h  continue to transfuse if hb< 7  continue protonix 40 mg ID IV  fobt negative for occult blood but minimal specimen obtained, I doubt this is true negative based on pt detailing h/o intermittent dark colored stool in her history  hold all AC, INR therapeutic ordered vitamin K 2.5 mg x 1 dose in preparation for ERCP  f/u GI (Dr. Coyne): for ERCP tomorrow  cardio michelle-operative risk stratification is pending  heme/once (Dr. Diana) f/u folate and b12, they will follow

## 2017-10-31 NOTE — PROGRESS NOTE ADULT - SUBJECTIVE AND OBJECTIVE BOX
INTERVAL HPI/OVERNIGHT EVENTS:    Patient noted to have severe abdominal pain this morning in lower right quadrant -- stat CT ordered which revealed fecal impaction/high fecal load in colon along with an incidental finding for a large common hepatic duct stone. Colonoscopy was cancelled at this point, EGD remained on schedule -- when patient arrived in the endoscopy suite she was noted to be in rapid afib and EGD was postponed.       HPI:  82F PMHx Afib on Coumadin, HLD, CVA, MVP, OA, HTN, PVD (s/p RLE bypassx3, most recently 2012), Neuropathy, GERD presents with pain and swelling to right second toe that started 1 month ago. Patient states foot became red, painful, and a small wound with a scab started forming on the top portion of her second toe. It became extremely difficult to bear weight on the foot so she went to see Dr. Isaacs at the wound center and he drained the wound at which point, yellow pus drained from the wound. He prescribed her an antibiotic (she does not recall which one). She denies any fevers, chills, chesst pain or shortness of breat. Admits to joint pain due to her RA.     In ED, pt's vital was wnl. WBC 8.2, Hct: 26.1, lactate 0.8, , CMP revealed elevated bilirubin of 1.5, remainder is wnl , INR: 2.26. CXR showed no acute pathology. X ray of Rt foot showed possible small periarticular erosion medial aspect head of the proximal phalanx which might represent infection/ osteomyelitis. Pt received 1 dose  of  vanco and 1 dose of zosyn and 1 bolus of NS, BCX, UCX was sent in  ED. (28 Oct 2017 13:42)    MEDICATIONS  (STANDING):  amLODIPine   Tablet 5 milliGRAM(s) Oral at bedtime  atorvastatin 40 milliGRAM(s) Oral at bedtime  gabapentin 100 milliGRAM(s) Oral three times a day  losartan 50 milliGRAM(s) Oral daily  metoprolol     tartrate 100 milliGRAM(s) Oral two times a day  multivitamin 1 Tablet(s) Oral daily  pantoprazole  Injectable 40 milliGRAM(s) IV Push every 12 hours  piperacillin/tazobactam IVPB. 3.375 Gram(s) IV Intermittent every 8 hours  vancomycin  IVPB 1250 milliGRAM(s) IV Intermittent every 24 hours    MEDICATIONS  (PRN):  acetaminophen   Tablet. 650 milliGRAM(s) Oral every 6 hours PRN Mild Pain (1 - 3)  lidocaine 5% Ointment 1 Application(s) Topical two times a day PRN neuropathic pain  oxyCODONE    5 mG/acetaminophen 325 mG 2 Tablet(s) Oral every 6 hours PRN Severe Pain (7 - 10)  traMADol 50 milliGRAM(s) Oral every 8 hours PRN Moderate Pain (4 - 6)      Allergies    No Known Allergies    Intolerances          General:  No wt loss, fevers, chills, night sweats, fatigue,   Eyes:  Good vision, no reported pain  ENT:  No sore throat, pain, runny nose, dysphagia  CV:  No pain, palpitations, hypo/hypertension  Resp:  No dyspnea, cough, tachypnea, wheezing  GI:  No pain, No nausea, No vomiting, No diarrhea, No constipation, No weight loss, No fever, No pruritis, No rectal bleeding, No tarry stools, No dysphagia,  :  No pain, bleeding, incontinence, nocturia  Muscle:  No pain, weakness  Neuro:  No weakness, tingling, memory problems  Psych:  No fatigue, insomnia, mood problems, depression  Endocrine:  No polyuria, polydipsia, cold/heat intolerance  Heme:  No petechiae, ecchymosis, easy bruisability  Skin:  No rash, tattoos, scars, edema      PHYSICAL EXAM:   Vital Signs:  Vital Signs Last 24 Hrs  T(C): 37.1 (31 Oct 2017 17:18), Max: 37.1 (31 Oct 2017 17:18)  T(F): 98.7 (31 Oct 2017 17:18), Max: 98.7 (31 Oct 2017 17:18)  HR: 129 (31 Oct 2017 17:18) (91 - 135)  BP: 112/77 (31 Oct 2017 17:18) (112/77 - 139/83)  BP(mean): --  RR: 18 (31 Oct 2017 17:18) (16 - 21)  SpO2: 92% (31 Oct 2017 17:18) (91% - 94%)  Daily     Daily I&O's Summary    30 Oct 2017 07:01  -  31 Oct 2017 07:00  --------------------------------------------------------  IN: 590 mL / OUT: 0 mL / NET: 590 mL        GENERAL:  Appears stated age, well-groomed, well-nourished, mild distress secondary to abd pain  HEENT:  NC/AT,  conjunctivae clear and pink, no thyromegaly, nodules, adenopathy, no JVD, sclera -anicteric  CHEST:  Full & symmetric excursion, no increased effort, breath sounds clear  HEART:  Regular rhythm, S1, S2, no murmur/rub/S3/S4, no abdominal bruit, no edema  ABDOMEN:  Soft, non-tender,rlq tenderness, normoactive bowel sounds,  no masses ,no hepato-splenomegaly, no signs of chronic liver disease  EXTEREMITIES:  no cyanosis,clubbing or edema  SKIN:  No rash/erythema/ecchymoses/petechiae/wounds/abscess/warm/dry  NEURO:  Alert, oriented x2, no asterixis, no tremor, no encephalopathy      LABS:                        10.0   8.5   )-----------( 228      ( 31 Oct 2017 06:06 )             32.0     10-31    142  |  107  |  17  ----------------------------<  124<H>  3.3<L>   |  23  |  1.00    Ca    8.3<L>      31 Oct 2017 06:06    TPro  7.0  /  Alb  2.7<L>  /  TBili  1.0  /  DBili  .30<H>  /  AST  29  /  ALT  26  /  AlkPhos  92  10-31    PT/INR - ( 31 Oct 2017 06:06 )   PT: 27.0 sec;   INR: 2.43 ratio             amylase   lipase  RADIOLOGY & ADDITIONAL TESTS:

## 2017-10-31 NOTE — PROGRESS NOTE ADULT - PROBLEM SELECTOR PLAN 5
continue vanco/zosyn for now. f/u vanc trough  f/u podiatry (dr. Temple) for potential surgical procedure pending evaluation for GI bleed and vascular surgery  inflammatory markers suggestive of OM however cannot undergo MRI due to intracranial surgical clipping from 1980's  f/u vascular surgery (Dr. Sánchez)  f/u arterial doppler of RLE to further evaluate patency of stents

## 2017-10-31 NOTE — PROGRESS NOTE ADULT - SUBJECTIVE AND OBJECTIVE BOX
Patient is a 82y old  Female who presents with a chief complaint of weakness and pain both legs (28 Oct 2017 20:23)      Vascular Surgery Progress Note    Interval HPI: CTA reviewed and shows extensive multilevel occlusive disease of right >> left legs with extensive calcifications. She has ongoing ischemic pain in right foot. I do not  believe she has any vascular reconstructible options. She is high risk for right leg amputation. I will have IR do a conventional angiogram for further evaluation.    Medications:  piperacillin/tazobactam IVPB. 3.375  vancomycin  IVPB 1250      Allergies:  Allergies    No Known Allergies    Intolerances        Vital Signs Last 24 Hrs  T(C): 37 (31 Oct 2017 05:13), Max: 37.3 (30 Oct 2017 13:17)  T(F): 98.6 (31 Oct 2017 05:13), Max: 99.2 (30 Oct 2017 13:17)  HR: 94 (31 Oct 2017 05:13) (72 - 94)  BP: 139/83 (31 Oct 2017 05:13) (100/59 - 139/83)  BP(mean): --  RR: 16 (31 Oct 2017 05:13) (16 - 17)  SpO2: 94% (31 Oct 2017 05:13) (91% - 94%)  I&O's Summary    30 Oct 2017 07:01  -  31 Oct 2017 07:00  --------------------------------------------------------  IN: 590 mL / OUT: 0 mL / NET: 590 mL        Physical Exam:  Gen: NAD, A&Ox3  Vascular exam is unchanged.      LABS:                        10.0   8.5   )-----------( 228      ( 31 Oct 2017 06:06 )             32.0     10-31    142  |  107  |  17  ----------------------------<  124<H>  3.3<L>   |  23  |  1.00    Ca    8.3<L>      31 Oct 2017 06:06      PT/INR - ( 31 Oct 2017 06:06 )   PT: 27.0 sec;   INR: 2.43 ratio

## 2017-10-31 NOTE — PROVIDER CONTACT NOTE (OTHER) - SITUATION
Explained to Dr Parra that we do not administer Cardizem IV push per protocol. This must be adminsitered by MD

## 2017-10-31 NOTE — PROGRESS NOTE ADULT - ASSESSMENT
The patient has severe multilevel occlusive disease and may need  a right leg amputation. I will have IR do a conventional angiogram for further evaluation and request a 2nd. opinion  with her prior vascular surgeon Dr. Beasley

## 2017-10-31 NOTE — PROGRESS NOTE ADULT - PROBLEM SELECTOR PLAN 2
large common hepatic duct stone noted on imaging  biochemically/clinically does not appear to be obstructive currently  will plan for ERCP tomorrow if patient optimized

## 2017-10-31 NOTE — PROGRESS NOTE ADULT - ATTENDING COMMENTS
I personally conducted a physical examination of the patient. I personally gathered the patient's history. I edited the above listed findings which were prepared by the listed resident physician. I personally discussed the plan of care with the patient. The questions and concerns were addressed to the best of my ability. The patient is in agreement with the listed treatment plan.     A/P: 83yo F w/ complex medical issues w/ multiple active issues. Ischemic ulcer on R foot 2nd digit w/ multiple imaging modalities suggesting minimal to no flow to distal RLE. Now w/ large nonosbtructig hepatic duct stone which will require ERCP, scheduled for 11/1/17 - she is medically optimized and classified intermediate risk for low to intermediate risk procedure. She also has fecal impaction which clinically seems to have improved after attempt of fecal disimpaction and enema. Symptoms have improved. Afternoon abd xr tomorrow to reassess dilated loops of bowel proximally. Supratherapeutic INR, vitamin K administered, f/u AM INR.    I personally reviewed imaging and labs.

## 2017-10-31 NOTE — PROGRESS NOTE ADULT - PROBLEM SELECTOR PLAN 2
chronic. In RVR now 120-130s on telemetry  start cardizem iv 5mg/ml x 1. Follow up telemetry. Consider addition of PO cardizem 120 mg if still elevated  continue metoprolol with parameters  start  L total @ 100 ml/hour  maintain HR < 110  Anticoagulation held due to anemia and evaluation for gi bleed  continue to monitor on Tele  f/u cardiology ( Dr. cain)  OOB with assistance (activity)   Fall precautions

## 2017-10-31 NOTE — PROGRESS NOTE ADULT - PROBLEM SELECTOR PLAN 3
CT abdomen showin mm common hepatic duct calculus with mild biliary dilatation.   for ercp tomorrow with possible sphincterotomy  ordered vitamin k today for supratherapeutic INR  LFTs wnl  npo for procedure  Patient at intermediate risk for intermediate risk procedure. RCRI: 2  f/u GI (Dr. Coyne) unlikely obstructive  Pending Cardi clearance in the AM (Aware of procedure) CT abdomen showin mm common hepatic duct calculus with mild biliary dilatation.   for ercp tomorrow with possible sphincterotomy  ordered vitamin k today for supratherapeutic INR  LFTs wnl  npo for procedure  f/u GI (Dr. Coyne), unlikely obstructive based on labs and symptoms  Pending Cardiac clearance in the AM (Aware of procedure)

## 2017-11-01 LAB
ANION GAP SERPL CALC-SCNC: 8 MMOL/L — SIGNIFICANT CHANGE UP (ref 5–17)
APTT BLD: 38.4 SEC — HIGH (ref 27.5–37.4)
BUN SERPL-MCNC: 20 MG/DL — SIGNIFICANT CHANGE UP (ref 7–23)
CALCIUM SERPL-MCNC: 8 MG/DL — LOW (ref 8.5–10.1)
CHLORIDE SERPL-SCNC: 110 MMOL/L — HIGH (ref 96–108)
CO2 SERPL-SCNC: 25 MMOL/L — SIGNIFICANT CHANGE UP (ref 22–31)
CREAT SERPL-MCNC: 1.1 MG/DL — SIGNIFICANT CHANGE UP (ref 0.5–1.3)
GLUCOSE SERPL-MCNC: 110 MG/DL — HIGH (ref 70–99)
HCT VFR BLD CALC: 30.2 % — LOW (ref 34.5–45)
HGB BLD-MCNC: 9.5 G/DL — LOW (ref 11.5–15.5)
INR BLD: 1.87 RATIO — HIGH (ref 0.88–1.16)
MAGNESIUM SERPL-MCNC: 2 MG/DL — SIGNIFICANT CHANGE UP (ref 1.6–2.6)
MCHC RBC-ENTMCNC: 29.1 PG — SIGNIFICANT CHANGE UP (ref 27–34)
MCHC RBC-ENTMCNC: 31.5 GM/DL — LOW (ref 32–36)
MCV RBC AUTO: 92.4 FL — SIGNIFICANT CHANGE UP (ref 80–100)
PHOSPHATE SERPL-MCNC: 3.5 MG/DL — SIGNIFICANT CHANGE UP (ref 2.5–4.5)
PLATELET # BLD AUTO: 297 K/UL — SIGNIFICANT CHANGE UP (ref 150–400)
POTASSIUM SERPL-MCNC: 4.4 MMOL/L — SIGNIFICANT CHANGE UP (ref 3.5–5.3)
POTASSIUM SERPL-SCNC: 4.4 MMOL/L — SIGNIFICANT CHANGE UP (ref 3.5–5.3)
PROTHROM AB SERPL-ACNC: 20.7 SEC — HIGH (ref 9.8–12.7)
RBC # BLD: 3.26 M/UL — LOW (ref 3.8–5.2)
RBC # FLD: 14.4 % — SIGNIFICANT CHANGE UP (ref 10.3–14.5)
SODIUM SERPL-SCNC: 143 MMOL/L — SIGNIFICANT CHANGE UP (ref 135–145)
WBC # BLD: 13.3 K/UL — HIGH (ref 3.8–10.5)
WBC # FLD AUTO: 13.3 K/UL — HIGH (ref 3.8–10.5)

## 2017-11-01 PROCEDURE — 99233 SBSQ HOSP IP/OBS HIGH 50: CPT

## 2017-11-01 PROCEDURE — 99233 SBSQ HOSP IP/OBS HIGH 50: CPT | Mod: GC

## 2017-11-01 RX ORDER — POTASSIUM CHLORIDE 20 MEQ
20 PACKET (EA) ORAL
Qty: 0 | Refills: 0 | Status: COMPLETED | OUTPATIENT
Start: 2017-11-01 | End: 2017-11-04

## 2017-11-01 RX ORDER — POTASSIUM CHLORIDE 20 MEQ
40 PACKET (EA) ORAL EVERY 4 HOURS
Qty: 0 | Refills: 0 | Status: COMPLETED | OUTPATIENT
Start: 2017-11-01 | End: 2017-11-01

## 2017-11-01 RX ORDER — DEXTROSE MONOHYDRATE, SODIUM CHLORIDE, AND POTASSIUM CHLORIDE 50; .745; 4.5 G/1000ML; G/1000ML; G/1000ML
1000 INJECTION, SOLUTION INTRAVENOUS
Qty: 0 | Refills: 0 | Status: DISCONTINUED | OUTPATIENT
Start: 2017-11-01 | End: 2017-11-01

## 2017-11-01 RX ORDER — SODIUM CHLORIDE 9 MG/ML
500 INJECTION INTRAMUSCULAR; INTRAVENOUS; SUBCUTANEOUS
Qty: 0 | Refills: 0 | Status: DISCONTINUED | OUTPATIENT
Start: 2017-11-01 | End: 2017-11-09

## 2017-11-01 RX ADMIN — Medication 100 MILLIGRAM(S): at 17:43

## 2017-11-01 RX ADMIN — TRAMADOL HYDROCHLORIDE 50 MILLIGRAM(S): 50 TABLET ORAL at 17:43

## 2017-11-01 RX ADMIN — DEXTROSE MONOHYDRATE, SODIUM CHLORIDE, AND POTASSIUM CHLORIDE 100 MILLILITER(S): 50; .745; 4.5 INJECTION, SOLUTION INTRAVENOUS at 00:43

## 2017-11-01 RX ADMIN — Medication 100 MILLIGRAM(S): at 21:10

## 2017-11-01 RX ADMIN — Medication 40 MILLIEQUIVALENT(S): at 05:13

## 2017-11-01 RX ADMIN — Medication 166.67 MILLIGRAM(S): at 18:48

## 2017-11-01 RX ADMIN — Medication 1 MILLIGRAM(S): at 14:21

## 2017-11-01 RX ADMIN — Medication 100 MILLIGRAM(S): at 14:16

## 2017-11-01 RX ADMIN — PIPERACILLIN AND TAZOBACTAM 25 GRAM(S): 4; .5 INJECTION, POWDER, LYOPHILIZED, FOR SOLUTION INTRAVENOUS at 14:34

## 2017-11-01 RX ADMIN — ATORVASTATIN CALCIUM 40 MILLIGRAM(S): 80 TABLET, FILM COATED ORAL at 21:10

## 2017-11-01 RX ADMIN — PIPERACILLIN AND TAZOBACTAM 25 GRAM(S): 4; .5 INJECTION, POWDER, LYOPHILIZED, FOR SOLUTION INTRAVENOUS at 21:10

## 2017-11-01 RX ADMIN — GABAPENTIN 100 MILLIGRAM(S): 400 CAPSULE ORAL at 21:10

## 2017-11-01 RX ADMIN — Medication 1 TABLET(S): at 14:16

## 2017-11-01 RX ADMIN — PANTOPRAZOLE SODIUM 40 MILLIGRAM(S): 20 TABLET, DELAYED RELEASE ORAL at 17:43

## 2017-11-01 RX ADMIN — GABAPENTIN 100 MILLIGRAM(S): 400 CAPSULE ORAL at 05:14

## 2017-11-01 RX ADMIN — Medication 40 MILLIEQUIVALENT(S): at 01:09

## 2017-11-01 RX ADMIN — LOSARTAN POTASSIUM 50 MILLIGRAM(S): 100 TABLET, FILM COATED ORAL at 05:13

## 2017-11-01 RX ADMIN — GABAPENTIN 100 MILLIGRAM(S): 400 CAPSULE ORAL at 14:16

## 2017-11-01 RX ADMIN — PIPERACILLIN AND TAZOBACTAM 25 GRAM(S): 4; .5 INJECTION, POWDER, LYOPHILIZED, FOR SOLUTION INTRAVENOUS at 05:15

## 2017-11-01 RX ADMIN — Medication 100 MILLIGRAM(S): at 05:14

## 2017-11-01 RX ADMIN — TRAMADOL HYDROCHLORIDE 50 MILLIGRAM(S): 50 TABLET ORAL at 18:40

## 2017-11-01 RX ADMIN — Medication 20 MILLIEQUIVALENT(S): at 22:25

## 2017-11-01 RX ADMIN — PANTOPRAZOLE SODIUM 40 MILLIGRAM(S): 20 TABLET, DELAYED RELEASE ORAL at 05:13

## 2017-11-01 RX ADMIN — SODIUM CHLORIDE 100 MILLILITER(S): 9 INJECTION INTRAMUSCULAR; INTRAVENOUS; SUBCUTANEOUS at 14:42

## 2017-11-01 NOTE — PROGRESS NOTE ADULT - PROBLEM SELECTOR PLAN 4
(+) iron deficiency + history of AC and personal history of PUD  continue protonix 40 mg BID  monitor h/h, transfuse as needed  will need EGD/colonoscopy once optimized

## 2017-11-01 NOTE — PROGRESS NOTE ADULT - PROBLEM SELECTOR PLAN 3
CT abdomen showin mm common hepatic duct calculus with mild biliary dilatation.   for ercp today with possible sphincterotomy  s/p vitamin k with INR of 1.87 today  LFTs wnl. Electrolytes repleted  npo for procedure  f/u GI (Dr. Coyne), unlikely obstructive based on labs and symptoms  Pending Cardiac clearance  RCRI:2. she is medically optimized and classified intermediate risk for low to intermediate risk procedure CT abdomen showin mm common hepatic duct calculus with mild biliary dilatation.   for ercp today with possible sphincterotomy  s/p vitamin k with INR of 1.87 today  LFTs wnl. Electrolytes repleted  npo for procedure  f/u GI (Dr. Coyne), unlikely obstructive based on labs and symptoms   Cardiac cleared for ercp (dr. De Los Santos)  RCRI:2. she is medically optimized and classified intermediate risk for low to intermediate risk procedure likely 2/2 to GI bleed given history of bleeding GI ulcer   improving. continue to monitor H/h  continue to transfuse if hb< 7  continue protonix 40 mg ID IV  fobt negative for occult blood but minimal specimen obtained, I doubt this is true negative based on pt detailing h/o intermittent dark colored stool in her history  hold all AC, INR 1.87 s/p vitamin K yesterday in preparation for ERCP today  f/u GI (Dr. Coyne): for ERCP today  cardio cleared for ercp  heme/once (Dr. Diana): folate and b12 wnl, they will follow

## 2017-11-01 NOTE — PROGRESS NOTE ADULT - PROBLEM SELECTOR PLAN 4
likely source of abdominal pain yesterday - resolved today s/p smog enema  CT abdomen showing: Mild small and large bowel dilatation has developed with air-fluid levels suggesting a diarrheal state. Moderate fecal retention in the rectum with evidence of stercoral proctitis. 14 mm common hepatic duct calculus with mild biliary dilatation.   continue senna, colace and miralax  f/u abdominal flat/upright chronic. rates overnight between 100-112. Possible RVR yesterday related to patients anxiety with anesthesia  started PO cardizem 30mg q6hrs   continue metoprolol with parameters  maintain HR < 110  Anticoagulation held due to anemia and evaluation for gi bleed  continue to monitor on Tele  f/u cardiology ( Dr. randhawa)   OOB with assistance (activity)   Fall precautions

## 2017-11-01 NOTE — CHART NOTE - NSCHARTNOTEFT_GEN_A_CORE
Noted hypokalemia of 2.8 despite repletion with 3 K riders earlier today. Magnesium and Phosphorous levels noted to be normal. Ordered Potassium 40meq PO x 3 doses. Started on IVF with sodium chloride 0.9% with 20meq Potassium at rate of 100cc/hr while patient is NPO. Cause of hypokalemia may be metabolic in nature. Will follow up potassium level in AM. Patient is asymptomatic at this time. Discussed with Hospitalist on call.

## 2017-11-01 NOTE — PROGRESS NOTE ADULT - SUBJECTIVE AND OBJECTIVE BOX
[facility]    Edda, Dahiana, Evens, Toney, Terrie, Truman, Filiberto      [phone]    CHIEF COMPLAINT: Patient is a 82y old  Female who presents with a chief complaint of weakness and pain both legs (28 Oct 2017 20:23)    HPI:  82F PMHx Afib on Coumadin, HLD, CVA, MVP, OA, HTN, PVD (s/p RLE bypassx3, most recently 2012), Neuropathy, GERD presents with pain and swelling to right second toe that started 1 month ago. Patient states foot became red, painful, and a small wound with a scab started forming on the top portion of her second toe. It became extremely difficult to bear weight on the foot so she went to see Dr. Isaacs at the wound center and he drained the wound at which point, yellow pus drained from the wound. He prescribed her an antibiotic (she does not recall which one). She denies any fevers, chills, chesst pain or shortness of breat. Admits to joint pain due to her RA.     In ED, pt's vital was wnl. WBC 8.2, Hct: 26.1, lactate 0.8, , CMP revealed elevated bilirubin of 1.5, remainder is wnl , INR: 2.26. CXR showed no acute pathology. X ray of Rt foot showed possible small periarticular erosion medial aspect head of the proximal phalanx which might represent infection/ osteomyelitis. Pt received 1 dose  of  vanco and 1 dose of zosyn and 1 bolus of NS, BCX, UCX was sent in  ED. (28 Oct 2017 13:42)    Interim history: Patient seen and examined at bedside. Tearful and scared about use of anesthesia, reports "having bad dreams" and forgetting where she is.  Denies chest pain, SOB, dyspnea, orthopnea, palpitations. Procedure postponed due to the a fib RVR.     Telemetry: a fib in 90s     MEDICATIONS  (STANDING):  atorvastatin 40 milliGRAM(s) Oral at bedtime  diltiazem    Tablet 30 milliGRAM(s) Oral every 6 hours  docusate sodium 100 milliGRAM(s) Oral three times a day  gabapentin 100 milliGRAM(s) Oral three times a day  losartan 50 milliGRAM(s) Oral daily  metoprolol     tartrate 100 milliGRAM(s) Oral two times a day  multivitamin 1 Tablet(s) Oral daily  pantoprazole  Injectable 40 milliGRAM(s) IV Push every 12 hours  piperacillin/tazobactam IVPB. 3.375 Gram(s) IV Intermittent every 8 hours  polyethylene glycol 3350 17 Gram(s) Oral daily  senna 2 Tablet(s) Oral at bedtime  sodium chloride 0.9% with potassium chloride 20 mEq/L 1000 milliLiter(s) (100 mL/Hr) IV Continuous <Continuous>  vancomycin  IVPB 1250 milliGRAM(s) IV Intermittent every 24 hours    MEDICATIONS  (PRN):  acetaminophen   Tablet. 650 milliGRAM(s) Oral every 6 hours PRN Mild Pain (1 - 3)  lidocaine 5% Ointment 1 Application(s) Topical two times a day PRN neuropathic pain  oxyCODONE    5 mG/acetaminophen 325 mG 2 Tablet(s) Oral every 6 hours PRN Severe Pain (7 - 10)  traMADol 50 milliGRAM(s) Oral every 8 hours PRN Moderate Pain (4 - 6)      REVIEW OF SYSTEMS:  eye, ent, GI, , allergic, dermatologic, musculoskeletal and neurologic are negative except as described above    Vital Signs Last 24 Hrs  T(C): 36.6 (01 Nov 2017 05:05), Max: 37.1 (31 Oct 2017 17:18)  T(F): 97.8 (01 Nov 2017 05:05), Max: 98.7 (31 Oct 2017 17:18)  HR: 106 (01 Nov 2017 05:05) (103 - 135)  BP: 115/77 (01 Nov 2017 05:10) (104/80 - 134/83)  BP(mean): --  RR: 17 (01 Nov 2017 05:05) (17 - 21)  SpO2: 92% (01 Nov 2017 05:05) (92% - 93%)    I&O's Summary    31 Oct 2017 07:01  -  01 Nov 2017 07:00  --------------------------------------------------------  IN: 1034 mL / OUT: 250 mL / NET: 784 mL    PHYSICAL EXAM:    Constitutional: tearful elderly female in NAD   Pulmonary: Non-labored, breath sounds are clear bilaterally, No wheezing, rales or rhonchi  Cardiovascular: irregular, S1 and S2.  No murmur.  No rubs, gallops or clicks  Gastrointestinal: Bowel Sounds present, soft, nontender.   Lymph: No peripheral edema.   Neurological: Alert, no focal deficits  Skin: R second toe mildly erythematous, lesion blackened/ necrotic appearing   Psych: sad, tearful    LABS: All Labs Reviewed:                        9.5    13.3  )-----------( 297      ( 01 Nov 2017 06:11 )             30.2                         10.0   8.5   )-----------( 228      ( 31 Oct 2017 06:06 )             32.0                         9.1    x     )-----------( x        ( 30 Oct 2017 13:59 )             29.9     01 Nov 2017 06:11    143    |  110    |  20     ----------------------------<  110    4.4     |  25     |  1.10   31 Oct 2017 22:38    x      |  x      |  x      ----------------------------<  x      2.8     |  x      |  x      31 Oct 2017 21:07    x      |  x      |  x      ----------------------------<  x      2.9     |  x      |  x        Ca    8.0        01 Nov 2017 06:11  Ca    8.3        31 Oct 2017 06:06  Ca    7.6        30 Oct 2017 07:19  Phos  2.8       01 Nov 2017 06:11  Phos  3.5       31 Oct 2017 22:38  Mg     2.2       01 Nov 2017 06:11  Mg     2.0       31 Oct 2017 22:38    TPro  7.0    /  Alb  2.7    /  TBili  1.0    /  DBili  .30    /  AST  29     /  ALT  26     /  AlkPhos  92     31 Oct 2017 06:06    PT/INR - ( 01 Nov 2017 06:11 )   PT: 20.7 sec;   INR: 1.87 ratio         PTT - ( 01 Nov 2017 06:11 )  PTT:38.4 sec      Blood Culture: Organism --  Gram Stain Blood -- Gram Stain --  Specimen Source .Urine Clean Catch (Midstream)  Culture-Blood --    Organism --  Gram Stain Blood -- Gram Stain --  Specimen Source .Blood Blood-Peripheral  Culture-Blood --            RADIOLOGY:    EKG:    Echo:

## 2017-11-01 NOTE — PROGRESS NOTE ADULT - PROBLEM SELECTOR PLAN 1
has history of GI bleed and has been on coumadin  AC on hold   pending GI workup including EGD, colonoscopy , ERCP ( biliary stone) Dr Coyne

## 2017-11-01 NOTE — PROGRESS NOTE ADULT - ASSESSMENT
82F PMHx Afib on Coumadin, HLD, CVA, MVP, OA, HTN, PVD (s/p RLE bypassx3, most recently 2012), Neuropathy, GERD presents with pain and swelling to right second toe a/w RLE cellulitis, r/o osteomyelitis. Found to have symptomatic anemia from acute blood loss, suspect GI source.      -Patient went into afib yesterday, continue Cardizem 30mg q6, decrease hold parameters to ensure patient gets dose  -No evidence of acute ischemia or meaningful volume overload  -Currently no active cardiac conditions. No signs of ischemia, ADHF, clinical exam not consistent with stenotic valvular disease. Optimal from a cardiovascular condition to proceed with low risk endoscopic procedure. Routine hemodynamic monitoring is suggested during procedure.   -suspected GI bleed, hold coumadin temporarily, INR 1.87. To resume after GI bleed ruled out.  -Continue to trend H/H  -Last TTE: shows normal LV function  -Abx per primary team  -Continue amlodipine 5 QD  -Continue losartan 50 qd  -Continue statin drug  -Continue metoprolol 100 BID  -To follow closely with you 82F PMHx Afib on Coumadin, HLD, CVA, MVP, OA, HTN, PVD (s/p RLE bypassx3, most recently 2012), Neuropathy, GERD presents with pain and swelling to right second toe a/w RLE cellulitis, r/o osteomyelitis. Found to have symptomatic anemia from acute blood loss, suspect GI source.      -Patient went into afib yesterday, increase Cardizem 60mg q6, decrease hold parameters to ensure patient gets dose.  -No evidence of acute ischemia or meaningful volume overload  -Currently no active cardiac conditions. No signs of ischemia, ADHF, clinical exam not consistent with stenotic valvular disease. Optimal from a cardiovascular condition to proceed with low risk endoscopic procedure. Routine hemodynamic monitoring is suggested during procedure.   -suspected GI bleed, hold coumadin temporarily, INR 1.87. To resume after GI bleed ruled out.  -Continue to trend H/H  -Last TTE: shows normal LV function  -Abx per primary team  -Continue amlodipine 5 QD  -Continue losartan 50 qd  -Continue statin drug  -Continue metoprolol 100 BID  -To follow closely with you

## 2017-11-01 NOTE — PROGRESS NOTE ADULT - SUBJECTIVE AND OBJECTIVE BOX
CARITO CONSTANTINO is a 82yFemale , patient examined and chart reviewed. Patient seen and examined today being followed for infected right 2nd toe with OM and severe PVD       INTERVAL HPI/ OVERNIGHT EVENTS: Events noted, had Ct scan of abdomen and pelvis due to severe abdominal pain , noted to have hepatic duct stone, so scheduled for ERCP today . Awaiting angiogram right leg . She sill with severe  right leg pain . 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  Osteoarthritis  Afib  PVD (peripheral vascular disease)  Neuropathy: (Right lower leg)  H/O cerebral aneurysm repair: brain clips  CVA (cerebral vascular accident): (&quot;Mini-stroke&quot;,1990&#x27;s)  Rheumatoid arthritis  Hyperlipidemia  Hypertension  S/P cataract surgery: (Left eye)  Elective surgery: (&quot;Twisted bowel&quot;, 2014)  Elective surgery: (Exision of cyst on liver, 1985)  S/P ORIF (open reduction internal fixation) fracture: Left hip fx (2012) &amp; R hip fx (2013)  H/O cerebral aneurysm repair: Brain clips (1978(  Renal stone: Cysto stent placement 10/1/2014  PVD (peripheral vascular disease): s/p RLE bypass x 3, most recent 3/2012 Right external iliac to PT bypass w/ PTFE (2012)  S/P FRED (total abdominal hysterectomy): (1987, Hx of &quot;ovarian cancer?&quot;)      For details regarding the patient's social history, family history, and other miscellaneous elements, please refer the initial infectious diseases consultation and/or the admitting history and physical examination for this admission.      ROS:  CONSTITUTIONAL:  Negative fever or chills, feels well, good appetite  EYES:  Negative  blurry vision or double vision  CARDIOVASCULAR:  Negative for chest pain or palpitations  RESPIRATORY:  Negative for cough, wheezing, or SOB   GASTROINTESTINAL:  Negative for nausea, vomiting, diarrhea, constipation, or abdominal pain  GENITOURINARY:  Negative frequency, urgency or dysuria  NEUROLOGIC:  No headache, confusion, dizziness, lightheadedness  All other systems were reviewed and are negative         Current inpatient medications :    ANTIBIOTICS/RELEVANT:  piperacillin/tazobactam IVPB. 3.375 Gram(s) IV Intermittent every 8 hours  vancomycin  IVPB 1250 milliGRAM(s) IV Intermittent every 24 hours      acetaminophen   Tablet. 650 milliGRAM(s) Oral every 6 hours PRN  atorvastatin 40 milliGRAM(s) Oral at bedtime  diltiazem    Tablet 60 milliGRAM(s) Oral every 6 hours  docusate sodium 100 milliGRAM(s) Oral three times a day  gabapentin 100 milliGRAM(s) Oral three times a day  lidocaine 5% Ointment 1 Application(s) Topical two times a day PRN  losartan 50 milliGRAM(s) Oral daily  metoprolol     tartrate 100 milliGRAM(s) Oral two times a day  multivitamin 1 Tablet(s) Oral daily  oxyCODONE    5 mG/acetaminophen 325 mG 2 Tablet(s) Oral every 6 hours PRN  pantoprazole  Injectable 40 milliGRAM(s) IV Push every 12 hours  polyethylene glycol 3350 17 Gram(s) Oral daily  senna 2 Tablet(s) Oral at bedtime  sodium chloride 0.9%. 500 milliLiter(s) IV Continuous <Continuous>  traMADol 50 milliGRAM(s) Oral every 8 hours PRN      Objective:    10-31 @ 07:01  -  11-01 @ 07:00  --------------------------------------------------------  IN: 1034 mL / OUT: 250 mL / NET: 784 mL      T(C): 36.5 (11-01-17 @ 11:35), Max: 37.1 (10-31-17 @ 17:18)  HR: 120 (11-01-17 @ 14:13) (103 - 129)  BP: 130/91 (11-01-17 @ 14:13) (104/80 - 146/84)  RR: 18 (11-01-17 @ 14:13) (13 - 21)  SpO2: 95% (11-01-17 @ 14:13) (92% - 95%)  Wt(kg): --      Physical Exam:  General: well developed well nourished, in no acute distress  Eyes: sclera anicteric, pupils equal and reactive to light  ENMT: buccal mucosa moist, pharynx not injected  Neck: supple, trachea midline  Lungs: clear, no wheeze/rhonchi  Cardiovascular: regular rate and rhythm, S1 S2  Abdomen: soft, nontender, no organomegaly present, bowel sounds normal  Neurological: alert and oriented x3, Cranial Nerves II-XII grossly intact  Skin: no increased ecchymosis/petechiae/purpura  Lymph Nodes: no palpable cervical/supraclavicular lymph nodes enlargements  Extremities: no cyanosis/clubbing, right foot- sts of the right 2nd toe, with black scab, decreased erythema , jose is warm . Distal pulses not palpable             LABS:                          9.5    13.3  )-----------( 297      ( 01 Nov 2017 06:11 )             30.2       11-01    143  |  110<H>  |  20  ----------------------------<  110<H>  4.4   |  25  |  1.10    Ca    8.0<L>      01 Nov 2017 06:11  Phos  2.8     11-01  Mg     2.2     11-01    TPro  7.0  /  Alb  2.7<L>  /  TBili  1.0  /  DBili  .30<H>  /  AST  29  /  ALT  26  /  AlkPhos  92  10-31      PT/INR - ( 01 Nov 2017 06:11 )   PT: 20.7 sec;   INR: 1.87 ratio         PTT - ( 01 Nov 2017 06:11 )  PTT:38.4 sec              RECENT CULTURES:  Culture - Blood (10.28.17 @ 16:48)    Specimen Source: .Blood Blood-Peripheral    Culture Results:   No growth to date.                    RADIOLOGY & ADDITIONAL STUDIES:  CT Abdomen and Pelvis No Cont (10.31.17 @ 10:19) >    IMPRESSION:     Normal appendix.    Mild small and large bowel dilatation has developed with air-fluid levels   suggesting a diarrheal state.    Moderate fecal retention in the rectum with evidence of stercoral   proctitis.    14 mm common hepatic duct calculus with mild biliary dilatation.   Correlate with LFTs.     US Duplex Arterial Lower Ext Ltd, Right (10.31.17 @ 11:39) >      IMPRESSION:  Graft occlusions. Flow in the right popliteal artery. No   flow identified below the knee in named vessels.      Assessment :    82 y.o. female with multiple medical problems Afib on Coumadin, HLD, CVA, MVP, OA, HTN, PVD (s/p RLE bypassx3, most recently 2012), Neuropathy, GERD  presents with  ischemic right foot and leg coupled with gangrene of right 2nd. toe.  She has multiple failed bypasses of right leg. 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 Her pain is out of proportion to the toe infection and likely is related to ischemic pain right leg .    She is scheduled for angiogram of right leg as recommended by vascular surgery , podiatry still feels the gangrenous toe should be amputated and then try HBOT     She has fecal impaction , most likely cause of abdominal pain. She is scheduled for ERCP and sphincterotomy for the hepatic bile duct stone  Also needs angiogram of right leg     Plan :   - continue with Iv antibiotics for now , check Vanco trough before 3 rd dose   - follow ERCP findings   - trend renal function and CBC  - will discuss with podiatry about conservative treatment fro now      Continue with present regime .  Appropriate use of antibiotics and adverse effects reviewed.    I have discussed the above plan of care with patient's  family in detail. They expressed understanding of the  treatment plan . Risks, benefits and alternatives discussed in detail. I have asked if they have any questions or concerns and appropriately addressed them to the best of my ability .    5 minutes were spent in direct patient care reviewing notes, medications ,labs data/ imaging , discussion with multidisciplinary team.    Thank you for allowing me to participate in care of your patient .    Judi Coyne MD  [phone]

## 2017-11-01 NOTE — PROGRESS NOTE ADULT - PROBLEM SELECTOR PLAN 2
chronic. rates overnight between 100-112. Possible RVR yesterday related to patients anxiety with anesthesia  started PO cardizem 30mg q6hrs   continue metoprolol with parameters  maintain HR < 110  Anticoagulation held due to anemia and evaluation for gi bleed  continue to monitor on Tele  f/u cardiology ( Dr. randhawa)  OOB with assistance (activity)   Fall precautions chronic. rates overnight between 100-112. Possible RVR yesterday related to patients anxiety with anesthesia  started PO cardizem 30mg q6hrs   continue metoprolol with parameters  maintain HR < 110  Anticoagulation held due to anemia and evaluation for gi bleed  continue to monitor on Tele  f/u cardiology ( Dr. randhawa)   OOB with assistance (activity)   Fall precautions continue vanco/zosyn for now. f/u vanc trough  blood cx and urine cx NTD  f/u podiatry (dr. Temple) for potential surgical procedure pending evaluation for GI bleed and vascular surgery  inflammatory markers suggestive of OM however cannot undergo MRI due to intracranial surgical clipping from 1980's  f/u vascular surgery (Dr. Sánchez)  f/u arterial doppler of RLE to further evaluate patency of stents

## 2017-11-01 NOTE — PROGRESS NOTE ADULT - SUBJECTIVE AND OBJECTIVE BOX
INTERVAL HPI/OVERNIGHT EVENTS:    ERCP not performed due to rapid afib       HPI:  82F PMHx Afib on Coumadin, HLD, CVA, MVP, OA, HTN, PVD (s/p RLE bypassx3, most recently ), Neuropathy, GERD presents with pain and swelling to right second toe that started 1 month ago. Patient states foot became red, painful, and a small wound with a scab started forming on the top portion of her second toe. It became extremely difficult to bear weight on the foot so she went to see Dr. Isaacs at the wound center and he drained the wound at which point, yellow pus drained from the wound. He prescribed her an antibiotic (she does not recall which one). She denies any fevers, chills, chesst pain or shortness of breat. Admits to joint pain due to her RA.     In ED, pt's vital was wnl. WBC 8.2, Hct: 26.1, lactate 0.8, , CMP revealed elevated bilirubin of 1.5, remainder is wnl , INR: 2.26. CXR showed no acute pathology. X ray of Rt foot showed possible small periarticular erosion medial aspect head of the proximal phalanx which might represent infection/ osteomyelitis. Pt received 1 dose  of  vanco and 1 dose of zosyn and 1 bolus of NS, BCX, UCX was sent in  ED. (28 Oct 2017 13:42)    MEDICATIONS  (STANDING):  atorvastatin 40 milliGRAM(s) Oral at bedtime  diltiazem    Tablet 60 milliGRAM(s) Oral every 6 hours  docusate sodium 100 milliGRAM(s) Oral three times a day  gabapentin 100 milliGRAM(s) Oral three times a day  losartan 50 milliGRAM(s) Oral daily  metoprolol     tartrate 100 milliGRAM(s) Oral two times a day  multivitamin 1 Tablet(s) Oral daily  pantoprazole  Injectable 40 milliGRAM(s) IV Push every 12 hours  piperacillin/tazobactam IVPB. 3.375 Gram(s) IV Intermittent every 8 hours  polyethylene glycol 3350 17 Gram(s) Oral daily  senna 2 Tablet(s) Oral at bedtime  sodium chloride 0.9%. 500 milliLiter(s) (100 mL/Hr) IV Continuous <Continuous>  vancomycin  IVPB 1250 milliGRAM(s) IV Intermittent every 24 hours    MEDICATIONS  (PRN):  acetaminophen   Tablet. 650 milliGRAM(s) Oral every 6 hours PRN Mild Pain (1 - 3)  lidocaine 5% Ointment 1 Application(s) Topical two times a day PRN neuropathic pain  oxyCODONE    5 mG/acetaminophen 325 mG 2 Tablet(s) Oral every 6 hours PRN Severe Pain (7 - 10)  traMADol 50 milliGRAM(s) Oral every 8 hours PRN Moderate Pain (4 - 6)      Allergies    No Known Allergies    Intolerances          General:  No wt loss, fevers, chills, night sweats, fatigue,   Eyes:  Good vision, no reported pain  ENT:  No sore throat, pain, runny nose, dysphagia  CV:  No pain, palpitations, hypo/hypertension  Resp:  No dyspnea, cough, tachypnea, wheezing  GI:  No pain, No nausea, No vomiting, No diarrhea, No constipation, No weight loss, No fever, No pruritis, No rectal bleeding, No tarry stools, No dysphagia,  :  No pain, bleeding, incontinence, nocturia  Muscle:  rle pain  Neuro:  No weakness, tingling, memory problems  Psych:  No fatigue, insomnia, mood problems, depression  Endocrine:  No polyuria, polydipsia, cold/heat intolerance  Heme:  No petechiae, ecchymosis, easy bruisability  Skin:  No rash, tattoos, scars, edema      PHYSICAL EXAM:   Vital Signs:  Vital Signs Last 24 Hrs  T(C): 36.5 (2017 11:35), Max: 37.1 (31 Oct 2017 17:18)  T(F): 97.7 (2017 11:35), Max: 98.7 (31 Oct 2017 17:18)  HR: 120 (2017 14:13) (103 - 129)  BP: 130/91 (2017 14:13) (104/80 - 146/84)  BP(mean): --  RR: 18 (2017 14:13) (13 - 18)  SpO2: 95% (2017 14:13) (92% - 95%)  Daily     Daily Weight in k (2017 05:05)I&O's Summary    31 Oct 2017 07:01  -  2017 07:00  --------------------------------------------------------  IN: 1034 mL / OUT: 250 mL / NET: 784 mL        GENERAL:  Appears stated age, well-groomed, well-nourished, no distress  HEENT:  NC/AT,  conjunctivae clear and pink, no thyromegaly, nodules, adenopathy, no JVD, sclera -anicteric  CHEST:  Full & symmetric excursion, no increased effort, breath sounds clear  HEART:  Regular rhythm, S1, S2, no murmur/rub/S3/S4, no abdominal bruit, no edema  ABDOMEN:  Soft, non-tender, non-distended, normoactive bowel sounds,  no masses ,no hepato-splenomegaly, no signs of chronic liver disease  EXTEREMITIES:  no cyanosis,clubbing or edema  SKIN:  No rash/erythema/ecchymoses/petechiae/wounds/abscess/warm/dry  NEURO:  Alert, oriented, no asterixis, no tremor, no encephalopathy      LABS:                        9.5    13.3  )-----------( 297      ( 2017 06:11 )             30.2     11-    143  |  110<H>  |  20  ----------------------------<  110<H>  4.4   |  25  |  1.10    Ca    8.0<L>      2017 06:11  Phos  2.8       Mg     2.2         TPro  7.0  /  Alb  2.7<L>  /  TBili  1.0  /  DBili  .30<H>  /  AST  29  /  ALT  26  /  AlkPhos  92  10-31    PT/INR - ( 2017 06:11 )   PT: 20.7 sec;   INR: 1.87 ratio         PTT - ( 2017 06:11 )  PTT:38.4 sec    amylase   lipase  RADIOLOGY & ADDITIONAL TESTS:

## 2017-11-01 NOTE — PROGRESS NOTE ADULT - PROBLEM SELECTOR PLAN 5
continue vanco/zosyn for now. f/u vanc trough  blood cx and urine cx NTD  f/u podiatry (dr. Temple) for potential surgical procedure pending evaluation for GI bleed and vascular surgery  inflammatory markers suggestive of OM however cannot undergo MRI due to intracranial surgical clipping from 1980's  f/u vascular surgery (Dr. Sánchez)  f/u arterial doppler of RLE to further evaluate patency of stents likely source of abdominal pain yesterday - resolved today s/p smog enema  CT abdomen showing: Mild small and large bowel dilatation has developed with air-fluid levels suggesting a diarrheal state. Moderate fecal retention in the rectum with evidence of stercoral proctitis. 14 mm common hepatic duct calculus with mild biliary dilatation.   continue senna, colace and miralax  f/u abdominal flat/upright

## 2017-11-01 NOTE — PROGRESS NOTE ADULT - SUBJECTIVE AND OBJECTIVE BOX
Interval History:  complains of pain right foot  s/p  EGD 10/31/17  Chart reviewed and events noted;     Vital Signs stable    PHYSICAL EXAM  General: adult in NAD  HEENT: clear oropharynx, anicteric sclera, pink conjunctivae  Neck: supple  CV: normal S1S2 with no murmur rubs or gallops  Lungs: clear to auscultation, no wheezes, no rhales  Abdomen: soft non-tender non-distended, no hepato/splenomegaly  Ext: right foot second toe with swelling erythema and area of necrosis  Skin: no rashes and no petichiae  Neuro: alert and oriented X3 no focal deficits      LABS:  CBC Full  -  ( 31 Oct 2017 06:06 )  WBC Count : 8.5 K/uL  Hemoglobin : 10.0 g/dL  Hematocrit : 32.0 %  Platelet Count - Automated : 228 K/uL  Mean Cell Volume : 93.1 fl  Mean Cell Hemoglobin : 29.1 pg  Mean Cell Hemoglobin Concentration : 31.2 gm/dL  Auto Neutrophil # : x  Auto Lymphocyte # : x  Auto Monocyte # : x  Auto Eosinophil # : x  Auto Basophil # : x  Auto Neutrophil % : x  Auto Lymphocyte % : x  Auto Monocyte % : x  Auto Eosinophil % : x  Auto Basophil % : x    10-31    142  |  107  |  17  ----------------------------<  124<H>  3.3<L>   |  23  |  1.00    Ca    8.3<L>      31 Oct 2017 06:06          LABS:    CBC Full  -  ( 01 Nov 2017 06:11 )  WBC Count : 13.3 K/uL  Hemoglobin : 9.5 g/dL  Hematocrit : 30.2 %  Platelet Count - Automated : 297 K/uL  Mean Cell Volume : 92.4 fl  Mean Cell Hemoglobin : 29.1 pg  Mean Cell Hemoglobin Concentration : 31.5 gm/dL      11-01    143  |  110<H>  |  20  ----------------------------<  110<H>  4.4   |  25  |  1.10    fe studies  Ferritin, Serum: 236.0 ng/mL (10-29 @ 11:05)  Iron - Total Binding Capacity.: 142 ug/dL (10-29 @ 11:05) Interval History:  complains of pain right foot  was not able to tolerate procedure the day prior due to uncontrolled heart rate   patient is frustrated regarding multiple planned procedures  concerned regarding bad experience with anesthesia in the past   had several BMs ( as per RN)    Vital Signs stable    PHYSICAL EXAM  General: adult in NAD  HEENT: clear oropharynx, anicteric sclera, pink conjunctivae  Neck: supple  CV: normal S1S2 with no murmur rubs or gallops  Lungs: clear to auscultation, no wheezes, no rales  Abdomen: soft non-tender non-distended, no hepatosplenomegaly  Ext: right foot second toe with swelling erythema and area of necrosis, warm , no severe pain  Skin: no rashes and no petechiae  Neuro: alert and oriented X3 no focal deficits      LABS:  CBC Full  -  ( 31 Oct 2017 06:06 )  WBC Count : 8.5 K/uL  Hemoglobin : 10.0 g/dL  Hematocrit : 32.0 %  Platelet Count - Automated : 228 K/uL  Mean Cell Volume : 93.1 fl  Mean Cell Hemoglobin : 29.1 pg  Mean Cell Hemoglobin Concentration : 31.2 gm/dL  Auto Neutrophil # : x  Auto Lymphocyte # : x  Auto Monocyte # : x  Auto Eosinophil # : x  Auto Basophil # : x  Auto Neutrophil % : x  Auto Lymphocyte % : x  Auto Monocyte % : x  Auto Eosinophil % : x  Auto Basophil % : x    10-31    142  |  107  |  17  ----------------------------<  124<H>  3.3<L>   |  23  |  1.00    Ca    8.3<L>      31 Oct 2017 06:06          LABS:    CBC Full  -  ( 01 Nov 2017 06:11 )  WBC Count : 13.3 K/uL  Hemoglobin : 9.5 g/dL  Hematocrit : 30.2 %  Platelet Count - Automated : 297 K/uL  Mean Cell Volume : 92.4 fl  Mean Cell Hemoglobin : 29.1 pg  Mean Cell Hemoglobin Concentration : 31.5 gm/dL      11-01    143  |  110<H>  |  20  ----------------------------<  110<H>  4.4   |  25  |  1.10    fe studies  Ferritin, Serum: 236.0 ng/mL (10-29 @ 11:05)  Iron - Total Binding Capacity.: 142 ug/dL (10-29 @ 11:05)

## 2017-11-01 NOTE — PROGRESS NOTE ADULT - ASSESSMENT
83 yo woman with extensive history of vascular disease with arterial insufficiency in the LE's, previous history of GI bleed in 2015; has been seen by our group multiple times but has failed to follow up in office; now admitted for chronic right foot wound (likely osteomyelitis) and found to have anemia  - iron studies c/w chronic disease but cannot rule out GI blood loss with patient on chronic anticoagulation with coumadin as outpatient  - anticoagulants on hold for now  - sufficient Folate/B12 level  - advise supportive transfusion to maintain Hg >7 however likely will not improve until wound is well healed  - follow CBC daily  - vascular eval for popliteal artery occlusion

## 2017-11-01 NOTE — PROGRESS NOTE ADULT - PROBLEM SELECTOR PLAN 2
large common hepatic duct stone noted on imaging  biochemically/clinically does not appear to be obstructive currently  will plan for ERCP Friday 11/13 if optimized   monitor liver enzymes

## 2017-11-01 NOTE — PROGRESS NOTE ADULT - SUBJECTIVE AND OBJECTIVE BOX
82F PMHx Afib on Coumadin, HLD, CVA, MVP, OA, HTN, PVD (s/p RLE bypassx3, most recently 2012), Neuropathy, GERD presents with pain and swelling to right second toe a/w RLE cellulitis, r/o osteomyelitis. Found to have symptomatic anemia from acute blood loss, suspect GI source    HPI:  Patient was reported to have low potassium overnight (2.8). She was repleted with improvement in AM labs. Patient was seen toady morning at the bedside. She is anxious about the procedure today due to anesthesia. She has had multiple procedures for which she required anesthesia in the past without issue but recalls one instance where she had a "bad dream". She still arees to move forward with the procedure today. She denies any abdominal pain today, fevers, nausea, vomiting, sob, chest pain or palpitations.  Nurses report that patient had two large bowel movements overnight.    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)  Osteoarthritis (715.90)  Afib (427.31)  PVD (peripheral vascular disease) (443.9)  Neuropathy (355.9): (Right lower leg)  Gout (274.9)  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  CVA (cerebral vascular accident) (434.91): (&quot;Mini-stroke&quot;,1990&#x27;s)  Rheumatoid arthritis (714.0)  Asthma (493.90)  Hyperlipidemia (272.4)  Hypertension (401.9)  S/P cataract surgery (Z98.49): (Left eye)  Elective surgery (Z41.9): (&quot;Twisted bowel&quot;, 2014)  Elective surgery (Z41.9): (Exision of cyst on liver, 1985)  S/P ORIF (open reduction internal fixation) fracture (V45.89): Left hip fx (2012) &amp; R hip fx (2013)  H/O cerebral aneurysm repair (V45.89): Brain clips (1978(  Renal stone (592.0): Cysto stent placement 10/1/2014  PVD (peripheral vascular disease) (443.9): s/p RLE bypass x 3, most recent 3/2012 Right external iliac to PT bypass w/ PTFE (2012)  S/P FRED (total abdominal hysterectomy) (V88.01): (1987, Hx of &quot;ovarian cancer?&quot;)        REVIEW OF SYSTEMS:  CONSTITUTIONAL: denies fever, chills, fatigue, weakness  HEENT: denies blurred vision, sore throat  SKIN: denies new lesions, rash  CARDIOVASCULAR: denies chest pain, chest pressure, palpitations  RESPIRATORY: denies shortness of breath, sputum production  GASTROINTESTINAL: denies nausea, vomiting, diarrhea, abdominal pain, dyschezia  GENITOURINARY: denies dysuria, discharge  NEUROLOGICAL: denies numbness, headache, focal weakness  MUSCULOSKELETAL: denies new joint pain, muscle aches  PSYCH: anxiety with procedure today. denies depression at this time      PHYSICAL EXAM:  GENERAL: NAD,   HEAD:  Atraumatic, Normocephalic  EYES: EOMI, fixed dilated pupil on the left  NERVOUS SYSTEM:  Alert & Oriented X3, poor concentration; Motor Strength 5/5 B/L upper and lower extremities;   CHEST/LUNG: Clear to percussion bilaterally; No rales, rhonchi, wheezing, or rubs  HEART: Regular rate and rhythm; No murmurs, rubs, or gallops  ABDOMEN: Soft, Nontender, Nondistended; Bowel sounds present  EXTREMITIES:  trace Peripheral Pulses in bilateral lower extremities, b/l feet are warm, tender to palpation on the right foot at the toe    SKIN: right foot, 2nd toe lesion blackened with necrotic appearance at tip, trace erythema distal to surgical pen marking markedly improved.    MEDICATIONS  (STANDING):  atorvastatin 40 milliGRAM(s) Oral at bedtime  diltiazem    Tablet 30 milliGRAM(s) Oral every 6 hours  docusate sodium 100 milliGRAM(s) Oral three times a day  gabapentin 100 milliGRAM(s) Oral three times a day  losartan 50 milliGRAM(s) Oral daily  metoprolol     tartrate 100 milliGRAM(s) Oral two times a day  multivitamin 1 Tablet(s) Oral daily  pantoprazole  Injectable 40 milliGRAM(s) IV Push every 12 hours  piperacillin/tazobactam IVPB. 3.375 Gram(s) IV Intermittent every 8 hours  polyethylene glycol 3350 17 Gram(s) Oral daily  senna 2 Tablet(s) Oral at bedtime  sodium chloride 0.9% with potassium chloride 20 mEq/L 1000 milliLiter(s) (100 mL/Hr) IV Continuous <Continuous>  vancomycin  IVPB 1250 milliGRAM(s) IV Intermittent every 24 hours    MEDICATIONS  (PRN):  acetaminophen   Tablet. 650 milliGRAM(s) Oral every 6 hours PRN Mild Pain (1 - 3)  lidocaine 5% Ointment 1 Application(s) Topical two times a day PRN neuropathic pain  oxyCODONE    5 mG/acetaminophen 325 mG 2 Tablet(s) Oral every 6 hours PRN Severe Pain (7 - 10)  traMADol 50 milliGRAM(s) Oral every 8 hours PRN Moderate Pain (4 - 6)      LABS:                        9.5    13.3  )-----------( 297      ( 01 Nov 2017 06:11 )             30.2     11-01    143  |  110<H>  |  20  ----------------------------<  110<H>  4.4   |  25  |  1.10    Ca    8.0<L>      01 Nov 2017 06:11  Phos  2.8     11-01  Mg     2.2     11-01    TPro  7.0  /  Alb  2.7<L>  /  TBili  1.0  /  DBili  .30<H>  /  AST  29  /  ALT  26  /  AlkPhos  92  10-31    PT/INR - ( 01 Nov 2017 06:11 )   PT: 20.7 sec;   INR: 1.87 ratio         PTT - ( 01 Nov 2017 06:11 )  PTT:38.4 sec    CAPILLARY BLOOD GLUCOSE

## 2017-11-01 NOTE — PROGRESS NOTE ADULT - ATTENDING COMMENTS
Seen/examined. Agree with above.   Has been AF overnight in 80's. Rate controlled well. After being brought down to ERCP, again with an episode of RVR.  I have recommended that an extra 10 IV Cardizem is given and the po dose increased to 60 po q6hr. There is definitely a component of anxiety, as her HR was well controlled until going down for the procedure.  If remains elevated, we can give another 10 IV Cardizem and increase the baseline dose.  Continue with lopressor 100mg po bid as well.  Coumadin held.  Will hopefully have HR better controlled for ERCP

## 2017-11-01 NOTE — PROGRESS NOTE ADULT - ATTENDING COMMENTS
I personally conducted a physical examination of the patient. I personally gathered the patient's history. I edited the above listed findings which were prepared by the listed resident physician. I personally discussed the plan of care with the patient. The questions and concerns were addressed to the best of my ability. The patient is in agreement with the listed treatment plan.     A/P: 83yo F w/ complex medical issues w/ multiple active issues. Ischemic ulcer on R foot 2nd digit w/ multiple imaging modalities suggesting minimal to no flow to distal RLE. Now w/ large nonosbtructing hepatic duct stone which will require ERCP, scheduled for 11/1/17 - she is medically optimized and classified intermediate risk for low to intermediate risk procedure. She also has fecal impaction which clinically seems to have improved after attempt of fecal disimpaction and enema. Symptoms have improved. Afternoon abd xr today to reassess dilated loops of bowel proximally. Supratherapeutic INR yesterday with vitamin K administered, improved today.    I personally reviewed imaging and labs. I personally conducted a physical examination of the patient. I personally gathered the patient's history. I edited the above listed findings which were prepared by the listed resident physician. I personally discussed the plan of care with the patient. The questions and concerns were addressed to the best of my ability. The patient is in agreement with the listed treatment plan.     A/P: 83yo F w/ complex medical issues w/ multiple active issues. Ischemic ulcer on R foot 2nd digit w/ multiple imaging modalities suggesting minimal to no flow to distal RLE. Now w/ large nonosbtructing hepatic duct stone which will require ERCP - she is medically optimized and classified intermediate risk for low to intermediate risk procedure. Unable to go for procedure today due to afib w/ RVR. Increased cardizem to 60 q 6hrs as long as BP tolerates    I personally reviewed imaging and labs.

## 2017-11-01 NOTE — PROGRESS NOTE ADULT - PROBLEM SELECTOR PLAN 7
CT angiography showing multiple sites of occlusion in b/l lower extremities  f/u vascular surgery (Dr. Sánchez): Recommends holding digital amputation on right lower extremity at this time due to poor healing capability from poor blood flow. considering IR angiogram for tomorrow (11/2)

## 2017-11-02 DIAGNOSIS — I10 ESSENTIAL (PRIMARY) HYPERTENSION: ICD-10-CM

## 2017-11-02 LAB
ANION GAP SERPL CALC-SCNC: 10 MMOL/L — SIGNIFICANT CHANGE UP (ref 5–17)
BUN SERPL-MCNC: 15 MG/DL — SIGNIFICANT CHANGE UP (ref 7–23)
CALCIUM SERPL-MCNC: 8.3 MG/DL — LOW (ref 8.5–10.1)
CHLORIDE SERPL-SCNC: 111 MMOL/L — HIGH (ref 96–108)
CO2 SERPL-SCNC: 22 MMOL/L — SIGNIFICANT CHANGE UP (ref 22–31)
CREAT SERPL-MCNC: 0.89 MG/DL — SIGNIFICANT CHANGE UP (ref 0.5–1.3)
CULTURE RESULTS: SIGNIFICANT CHANGE UP
CULTURE RESULTS: SIGNIFICANT CHANGE UP
GLUCOSE SERPL-MCNC: 92 MG/DL — SIGNIFICANT CHANGE UP (ref 70–99)
HCT VFR BLD CALC: 32 % — LOW (ref 34.5–45)
HGB BLD-MCNC: 10 G/DL — LOW (ref 11.5–15.5)
INR BLD: 1.3 RATIO — HIGH (ref 0.88–1.16)
MAGNESIUM SERPL-MCNC: 2.1 MG/DL — SIGNIFICANT CHANGE UP (ref 1.6–2.6)
MCHC RBC-ENTMCNC: 29.2 PG — SIGNIFICANT CHANGE UP (ref 27–34)
MCHC RBC-ENTMCNC: 31.3 GM/DL — LOW (ref 32–36)
MCV RBC AUTO: 93.4 FL — SIGNIFICANT CHANGE UP (ref 80–100)
PHOSPHATE SERPL-MCNC: 1.6 MG/DL — LOW (ref 2.5–4.5)
PLATELET # BLD AUTO: 270 K/UL — SIGNIFICANT CHANGE UP (ref 150–400)
POTASSIUM SERPL-MCNC: 3.9 MMOL/L — SIGNIFICANT CHANGE UP (ref 3.5–5.3)
POTASSIUM SERPL-MCNC: 4.2 MMOL/L — SIGNIFICANT CHANGE UP (ref 3.5–5.3)
POTASSIUM SERPL-SCNC: 3.9 MMOL/L — SIGNIFICANT CHANGE UP (ref 3.5–5.3)
POTASSIUM SERPL-SCNC: 4.2 MMOL/L — SIGNIFICANT CHANGE UP (ref 3.5–5.3)
PROTHROM AB SERPL-ACNC: 14.2 SEC — HIGH (ref 9.8–12.7)
RBC # BLD: 3.43 M/UL — LOW (ref 3.8–5.2)
RBC # FLD: 14.9 % — HIGH (ref 10.3–14.5)
SODIUM SERPL-SCNC: 143 MMOL/L — SIGNIFICANT CHANGE UP (ref 135–145)
SPECIMEN SOURCE: SIGNIFICANT CHANGE UP
SPECIMEN SOURCE: SIGNIFICANT CHANGE UP
VANCOMYCIN TROUGH SERPL-MCNC: 17.2 UG/ML — SIGNIFICANT CHANGE UP (ref 10–20)
WBC # BLD: 10.6 K/UL — HIGH (ref 3.8–10.5)
WBC # FLD AUTO: 10.6 K/UL — HIGH (ref 3.8–10.5)

## 2017-11-02 PROCEDURE — 75625 CONTRAST EXAM ABDOMINL AORTA: CPT | Mod: 26

## 2017-11-02 PROCEDURE — 76937 US GUIDE VASCULAR ACCESS: CPT | Mod: 26

## 2017-11-02 PROCEDURE — 36245 INS CATH ABD/L-EXT ART 1ST: CPT

## 2017-11-02 PROCEDURE — 99233 SBSQ HOSP IP/OBS HIGH 50: CPT

## 2017-11-02 PROCEDURE — 75710 ARTERY X-RAYS ARM/LEG: CPT | Mod: 26,RT

## 2017-11-02 PROCEDURE — 99233 SBSQ HOSP IP/OBS HIGH 50: CPT | Mod: GC

## 2017-11-02 RX ORDER — POTASSIUM PHOSPHATE, MONOBASIC POTASSIUM PHOSPHATE, DIBASIC 236; 224 MG/ML; MG/ML
15 INJECTION, SOLUTION INTRAVENOUS ONCE
Qty: 0 | Refills: 0 | Status: COMPLETED | OUTPATIENT
Start: 2017-11-02 | End: 2017-11-02

## 2017-11-02 RX ORDER — POTASSIUM CHLORIDE 20 MEQ
40 PACKET (EA) ORAL ONCE
Qty: 0 | Refills: 0 | Status: COMPLETED | OUTPATIENT
Start: 2017-11-02 | End: 2017-11-02

## 2017-11-02 RX ADMIN — Medication 40 MILLIEQUIVALENT(S): at 18:09

## 2017-11-02 RX ADMIN — Medication 20 MILLIEQUIVALENT(S): at 07:08

## 2017-11-02 RX ADMIN — Medication 1 TABLET(S): at 12:43

## 2017-11-02 RX ADMIN — Medication 166.67 MILLIGRAM(S): at 18:08

## 2017-11-02 RX ADMIN — TRAMADOL HYDROCHLORIDE 50 MILLIGRAM(S): 50 TABLET ORAL at 08:26

## 2017-11-02 RX ADMIN — Medication 100 MILLIGRAM(S): at 18:08

## 2017-11-02 RX ADMIN — PANTOPRAZOLE SODIUM 40 MILLIGRAM(S): 20 TABLET, DELAYED RELEASE ORAL at 07:07

## 2017-11-02 RX ADMIN — Medication 100 MILLIGRAM(S): at 07:08

## 2017-11-02 RX ADMIN — Medication 20 MILLIEQUIVALENT(S): at 18:08

## 2017-11-02 RX ADMIN — GABAPENTIN 100 MILLIGRAM(S): 400 CAPSULE ORAL at 07:08

## 2017-11-02 RX ADMIN — POLYETHYLENE GLYCOL 3350 17 GRAM(S): 17 POWDER, FOR SOLUTION ORAL at 12:43

## 2017-11-02 RX ADMIN — GABAPENTIN 100 MILLIGRAM(S): 400 CAPSULE ORAL at 21:49

## 2017-11-02 RX ADMIN — POTASSIUM PHOSPHATE, MONOBASIC POTASSIUM PHOSPHATE, DIBASIC 62.5 MILLIMOLE(S): 236; 224 INJECTION, SOLUTION INTRAVENOUS at 12:45

## 2017-11-02 RX ADMIN — Medication 100 MILLIGRAM(S): at 13:02

## 2017-11-02 RX ADMIN — PIPERACILLIN AND TAZOBACTAM 25 GRAM(S): 4; .5 INJECTION, POWDER, LYOPHILIZED, FOR SOLUTION INTRAVENOUS at 07:07

## 2017-11-02 RX ADMIN — GABAPENTIN 100 MILLIGRAM(S): 400 CAPSULE ORAL at 13:02

## 2017-11-02 RX ADMIN — LOSARTAN POTASSIUM 50 MILLIGRAM(S): 100 TABLET, FILM COATED ORAL at 07:08

## 2017-11-02 RX ADMIN — PIPERACILLIN AND TAZOBACTAM 25 GRAM(S): 4; .5 INJECTION, POWDER, LYOPHILIZED, FOR SOLUTION INTRAVENOUS at 13:38

## 2017-11-02 RX ADMIN — PANTOPRAZOLE SODIUM 40 MILLIGRAM(S): 20 TABLET, DELAYED RELEASE ORAL at 18:08

## 2017-11-02 RX ADMIN — ATORVASTATIN CALCIUM 40 MILLIGRAM(S): 80 TABLET, FILM COATED ORAL at 21:50

## 2017-11-02 RX ADMIN — PIPERACILLIN AND TAZOBACTAM 25 GRAM(S): 4; .5 INJECTION, POWDER, LYOPHILIZED, FOR SOLUTION INTRAVENOUS at 21:49

## 2017-11-02 NOTE — PROGRESS NOTE ADULT - PROBLEM SELECTOR PLAN 1
has history of GI bleed and has been on coumadin  AC on hold   pending GI workup including EGD, colonoscopy , ERCP ( biliary stone) Dr Coyne  now with significant PAD being evaluated by vascular surgery. anticoagulation continues on hold.  may require AKA. anticoagulation risks to be address by vascular, cardiology and GI

## 2017-11-02 NOTE — PROGRESS NOTE ADULT - SUBJECTIVE AND OBJECTIVE BOX
CARITO CONSTANTINO is a 82yFemale , patient examined and chart reviewed. Patient seen and examined today being followed for acute OM right 2nd toe with severe PVD       INTERVAL HPI/ OVERNIGHT EVENTS: Events noted, doing well, ERCP postponed till am tomorrow as she was still with YOLANDA .Had aortogram today by IR , results noted . Still with right leg pains     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  Osteoarthritis  Afib  PVD (peripheral vascular disease)  Neuropathy: (Right lower leg)  H/O cerebral aneurysm repair: brain clips  CVA (cerebral vascular accident): (&quot;Mini-stroke&quot;,1990&#x27;s)  Rheumatoid arthritis  Hyperlipidemia  Hypertension  S/P cataract surgery: (Left eye)  Elective surgery: (&quot;Twisted bowel&quot;, 2014)  Elective surgery: (Exision of cyst on liver, 1985)  S/P ORIF (open reduction internal fixation) fracture: Left hip fx (2012) &amp; R hip fx (2013)  H/O cerebral aneurysm repair: Brain clips (1978(  Renal stone: Cysto stent placement 10/1/2014  PVD (peripheral vascular disease): s/p RLE bypass x 3, most recent 3/2012 Right external iliac to PT bypass w/ PTFE (2012)  S/P FRED (total abdominal hysterectomy): (1987, Hx of &quot;ovarian cancer?&quot;)      For details regarding the patient's social history, family history, and other miscellaneous elements, please refer the initial infectious diseases consultation and/or the admitting history and physical examination for this admission.    ROS:  CONSTITUTIONAL:  Negative fever or chills, feels well, good appetite  EYES:  Negative  blurry vision or double vision  CARDIOVASCULAR:  Negative for chest pain or palpitations  RESPIRATORY:  Negative for cough, wheezing, or SOB   GASTROINTESTINAL:  Negative for nausea, vomiting, diarrhea, constipation, or abdominal pain  GENITOURINARY:  Negative frequency, urgency or dysuria  NEUROLOGIC:  No headache, confusion, dizziness, lightheadedness  All other systems were reviewed and are negative         Current inpatient medications :    ANTIBIOTICS/RELEVANT:  piperacillin/tazobactam IVPB. 3.375 Gram(s) IV Intermittent every 8 hours  vancomycin  IVPB 1250 milliGRAM(s) IV Intermittent every 24 hours      acetaminophen   Tablet. 650 milliGRAM(s) Oral every 6 hours PRN  atorvastatin 40 milliGRAM(s) Oral at bedtime  diltiazem    Tablet 60 milliGRAM(s) Oral every 6 hours  docusate sodium 100 milliGRAM(s) Oral three times a day  gabapentin 100 milliGRAM(s) Oral three times a day  lidocaine 5% Ointment 1 Application(s) Topical two times a day PRN  losartan 50 milliGRAM(s) Oral daily  metoprolol     tartrate 100 milliGRAM(s) Oral two times a day  multivitamin 1 Tablet(s) Oral daily  oxyCODONE    5 mG/acetaminophen 325 mG 2 Tablet(s) Oral every 6 hours PRN  pantoprazole  Injectable 40 milliGRAM(s) IV Push every 12 hours  polyethylene glycol 3350 17 Gram(s) Oral daily  potassium chloride    Tablet ER 20 milliEquivalent(s) Oral two times a day  senna 2 Tablet(s) Oral at bedtime  sodium chloride 0.9%. 500 milliLiter(s) IV Continuous <Continuous>  traMADol 50 milliGRAM(s) Oral every 8 hours PRN      Objective:    11-01 @ 07:01  -  11-02 @ 07:00  --------------------------------------------------------  IN: 2034 mL / OUT: 0 mL / NET: 2034 mL      T(C): 36.4 (11-02-17 @ 13:32), Max: 36.7 (11-01-17 @ 19:57)  HR: 73 (11-02-17 @ 13:32) (69 - 108)  BP: 152/74 (11-02-17 @ 13:32) (118/60 - 152/74)  RR: 18 (11-02-17 @ 13:32) (16 - 18)  SpO2: 96% (11-02-17 @ 13:32) (91% - 96%)  Wt(kg): --      Physical Exam:  General: well developed well nourished, in no acute distress  Eyes: sclera anicteric, pupils equal and reactive to light  ENMT: buccal mucosa moist, pharynx not injected  Neck: supple, trachea midline  Lungs: clear, no wheeze/rhonchi  Cardiovascular: regular rate and rhythm, S1 S2  Abdomen: soft, nontender, no organomegaly present, bowel sounds normal  Neurological: alert and oriented x3, Cranial Nerves II-XII grossly intact  Skin: no increased ecchymosis/petechiae/purpura  Lymph Nodes: no palpable cervical/supraclavicular lymph nodes enlargements  Extremities: no cyanosis/clubbing, no change, STS right 2nd toe with dried scab     LABS:                          10.0   10.6  )-----------( 270      ( 02 Nov 2017 07:11 )             32.0       11-02    143  |  111<H>  |  15  ----------------------------<  92  3.9   |  22  |  0.89    Ca    8.3<L>      02 Nov 2017 07:11  Phos  1.6     11-02  Mg     2.1     11-02        PT/INR - ( 02 Nov 2017 07:11 )   PT: 14.2 sec;   INR: 1.30 ratio         PTT - ( 01 Nov 2017 06:11 )  PTT:38.4 sec    RADIOLOGY & ADDITIONAL STUDIES:  IR Aortogram Abd/Eitan Iliofemoral (11.02.17 @ 10:49) >    INTERPRETATION: Aortic injection showed ectasia of the upper portion of   the infrarenal abdominal aorta with mild ectasia of the common iliac   arteries and occlusion of the right common femoral artery at the level of   the inguinal ligament. Collateral vessels reconstitute an isolated   segment of the mid right superficial femoral artery. There is also   reconstitution of an isolated segment of the above-knee popliteal artery.   Below the right knee, collateral vessels reconstitute the tibioperoneal   trunk via retrograde flow through the anterior tibial artery. The   posterior tibial artery is occluded. At the midpoint of the right leg,   the anterior tibial artery reconstitutes via collaterals and is not seen   beyond the ankle joint.  The peroneal artery is the remaining single   vessel runoff vessel of the right leg. . Above the ankle, it sends a   large collateral to reconstitute the posterior tibial artery above the   ankle. The dorsalis pedis artery was not seen.    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    Assessment :    82 y.o. female with multiple medical problems Afib on Coumadin, HLD, CVA, MVP, OA, HTN, PVD (s/p RLE bypassx3, most recently 2012), Neuropathy, GERD  presents with  ischemic right foot and leg coupled with gangrene of right 2nd. toe.  She has multiple failed bypasses of right leg. 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 Her pain is out of proportion to the toe infection and likely is related to ischemic pain right leg .    She is scheduled for angiogram of right leg as recommended by vascular surgery , podiatry still feels the gangrenous toe should be amputated and then try HBOT     She has fecal impaction , most likely cause of abdominal pain. She is scheduled for ERCP and sphincterotomy for the hepatic bile duct stone  Also needs angiogram of right leg     Plan :   - continue with Iv antibiotics for now , check Vanco trough before today's dose   - vascular surgery follow up to see if patient cleared for toe amputation   - follow ERCP findings   - trend renal function and CBC  - will discuss with podiatry about conservative treatment for now      Continue with present regime .  Appropriate use of antibiotics and adverse effects reviewed.    I have discussed the above plan of care with patient and her daughter in detail. They expressed understanding of the  treatment plan . Risks, benefits and alternatives discussed in detail. I have asked if they have any questions or concerns and appropriately addressed them to the best of my ability .    > 15 minutes were spent in direct patient care reviewing notes, medications ,labs data/ imaging , discussion with multidisciplinary team.    Thank you for allowing me to participate in care of your patient .    Judi Coyne MD  [phone]

## 2017-11-02 NOTE — PROGRESS NOTE ADULT - PROBLEM SELECTOR PLAN 7
CT angiography showing multiple sites of occlusion in b/l lower extremities  f/u vascular surgery (Dr. Sánchez): Recommends holding digital amputation on right lower extremity at this time due to poor healing capability from poor blood flow.   IR angiogram showing Multilevel occlusions involving the right common femoral   artery, the superficial femoral artery with collateral supplying vessel under the knee  f/u Vascular surgery (Dr. Sánchez) for recommendations regarding toe amputation CT angiography showing multiple sites of occlusion in b/l lower extremities  f/u vascular surgery (Dr. Sánchez): Recommends holding digital amputation on right lower extremity at this time due to poor healing capability from poor blood flow.   IR angiogram showing Multilevel occlusions involving the right common femoral   artery, the superficial femoral artery with collateral supplying vessel under the knee  f/u Vascular surgery (Dr. Sánchez) for recommendations regarding options

## 2017-11-02 NOTE — PROGRESS NOTE ADULT - SUBJECTIVE AND OBJECTIVE BOX
[facility]    Dahiana Bañuelos, Evens, Toney, Terrie, Truman, Morgandiana      [phone]    CHIEF COMPLAINT: Patient is a 82y old  Female who presents with a chief complaint of weakness and pain both legs (28 Oct 2017 20:23)      HPI:  82F PMHx Afib on Coumadin, HLD, CVA, MVP, OA, HTN, PVD (s/p RLE bypassx3, most recently 2012), Neuropathy, GERD presents with pain and swelling to right second toe that started 1 month ago. Patient states foot became red, painful, and a small wound with a scab started forming on the top portion of her second toe. It became extremely difficult to bear weight on the foot so she went to see Dr. Isaacs at the wound center and he drained the wound at which point, yellow pus drained from the wound. He prescribed her an antibiotic (she does not recall which one). She denies any fevers, chills, chesst pain or shortness of breat. Admits to joint pain due to her RA.     In ED, pt's vital was wnl. WBC 8.2, Hct: 26.1, lactate 0.8, , CMP revealed elevated bilirubin of 1.5, remainder is wnl , INR: 2.26. CXR showed no acute pathology. X ray of Rt foot showed possible small periarticular erosion medial aspect head of the proximal phalanx which might represent infection/ osteomyelitis. Pt received 1 dose  of  vanco and 1 dose of zosyn and 1 bolus of NS, BCX, UCX was sent in  ED. (28 Oct 2017 13:42)    Interim history: Patient seen and examined at bedside. Denies chest pain, SOB, dyspnea, orthopnea, palpitations.     Telemetry: NS 73s    MEDICATIONS  (STANDING):  atorvastatin 40 milliGRAM(s) Oral at bedtime  diltiazem    Tablet 60 milliGRAM(s) Oral every 6 hours  docusate sodium 100 milliGRAM(s) Oral three times a day  gabapentin 100 milliGRAM(s) Oral three times a day  losartan 50 milliGRAM(s) Oral daily  metoprolol     tartrate 100 milliGRAM(s) Oral two times a day  multivitamin 1 Tablet(s) Oral daily  pantoprazole  Injectable 40 milliGRAM(s) IV Push every 12 hours  piperacillin/tazobactam IVPB. 3.375 Gram(s) IV Intermittent every 8 hours  polyethylene glycol 3350 17 Gram(s) Oral daily  potassium chloride    Tablet ER 20 milliEquivalent(s) Oral two times a day  senna 2 Tablet(s) Oral at bedtime  sodium chloride 0.9%. 500 milliLiter(s) (100 mL/Hr) IV Continuous <Continuous>  vancomycin  IVPB 1250 milliGRAM(s) IV Intermittent every 24 hours    MEDICATIONS  (PRN):  acetaminophen   Tablet. 650 milliGRAM(s) Oral every 6 hours PRN Mild Pain (1 - 3)  lidocaine 5% Ointment 1 Application(s) Topical two times a day PRN neuropathic pain  oxyCODONE    5 mG/acetaminophen 325 mG 2 Tablet(s) Oral every 6 hours PRN Severe Pain (7 - 10)  traMADol 50 milliGRAM(s) Oral every 8 hours PRN Moderate Pain (4 - 6)      REVIEW OF SYSTEMS:  eye, ent, GI, , allergic, dermatologic, musculoskeletal and neurologic are negative except as described above    Vital Signs Last 24 Hrs  T(C): 36.4 (02 Nov 2017 13:32), Max: 36.7 (01 Nov 2017 19:57)  T(F): 97.5 (02 Nov 2017 13:32), Max: 98 (01 Nov 2017 19:57)  HR: 73 (02 Nov 2017 13:32) (69 - 108)  BP: 152/74 (02 Nov 2017 13:32) (118/60 - 152/74)  BP(mean): --  RR: 18 (02 Nov 2017 13:32) (16 - 18)  SpO2: 96% (02 Nov 2017 13:32) (91% - 96%)    I&O's Summary    01 Nov 2017 07:01  -  02 Nov 2017 07:00  --------------------------------------------------------  IN: 2034 mL / OUT: 0 mL / NET: 2034 mL        Telemetry past 24h:    PHYSICAL EXAM:  Constitutional: elderly female in NAD   Pulmonary: Non-labored, breath sounds are clear bilaterally, No wheezing, rales or rhonchi  Cardiovascular: regular, S1 and S2.  No murmur.  No rubs, gallops or clicks  Gastrointestinal: Bowel Sounds present, soft, nontender.   Lymph: No peripheral edema.   Neurological: Alert, no focal deficits  Skin: R second toe mildly erythematous, lesion blackened/ necrotic appearing       LABS: All Labs Reviewed:                        10.0   10.6  )-----------( 270      ( 02 Nov 2017 07:11 )             32.0                         9.5    13.3  )-----------( 297      ( 01 Nov 2017 06:11 )             30.2                         10.0   8.5   )-----------( 228      ( 31 Oct 2017 06:06 )             32.0     02 Nov 2017 07:11    143    |  111    |  15     ----------------------------<  92     3.9     |  22     |  0.89   01 Nov 2017 06:11    143    |  110    |  20     ----------------------------<  110    4.4     |  25     |  1.10   31 Oct 2017 22:38    x      |  x      |  x      ----------------------------<  x      2.8     |  x      |  x        Ca    8.3        02 Nov 2017 07:11  Ca    8.0        01 Nov 2017 06:11  Ca    8.3        31 Oct 2017 06:06  Phos  1.6       02 Nov 2017 07:11  Phos  2.8       01 Nov 2017 06:11  Phos  3.5       31 Oct 2017 22:38  Mg     2.1       02 Nov 2017 07:11  Mg     2.2       01 Nov 2017 06:11  Mg     2.0       31 Oct 2017 22:38    TPro  7.0    /  Alb  2.7    /  TBili  1.0    /  DBili  .30    /  AST  29     /  ALT  26     /  AlkPhos  92     31 Oct 2017 06:06    PT/INR - ( 02 Nov 2017 07:11 )   PT: 14.2 sec;   INR: 1.30 ratio         PTT - ( 01 Nov 2017 06:11 )  PTT:38.4 sec      Blood Culture: Organism --  Gram Stain Blood -- Gram Stain --  Specimen Source .Urine Clean Catch (Midstream)  Culture-Blood --            RADIOLOGY:    EKG:    Echo:

## 2017-11-02 NOTE — BRIEF OPERATIVE NOTE - PROCEDURE
<<-----Click on this checkbox to enter Procedure Angiogram, vessel, iliac and femoral, right  11/02/2017  rt leg angio via left groin  Active  JUNAN

## 2017-11-02 NOTE — PROGRESS NOTE ADULT - ATTENDING COMMENTS
I personally conducted a physical examination of the patient. I personally gathered the patient's history. I edited the above listed findings which were prepared by the listed resident physician. I personally discussed the plan of care with the patient. The questions and concerns were addressed to the best of my ability. The patient is in agreement with the listed treatment plan.     A/P: 82F PMHx Afib on Coumadin, HLD, CVA, MVP, OA, HTN, PVD (s/p RLE bypassx3, most recently 2012), Neuropathy, GERD presents with ischemic ulcer of R foot 2nd digit. Incidentally found w/ nonobstructing hepatic duct stone. Scheduled for ERCP tomorrow. CT angio shows concerning findings for complex disease in the RLE. I had discussion with vascular surgery. Pt will discuss w/ her vascular surgeon who has access to our angio findings. Will consider her options and discuss further w/ surgical team

## 2017-11-02 NOTE — PROGRESS NOTE ADULT - PROBLEM SELECTOR PLAN 1
CT abdomen showin mm common hepatic duct calculus with mild biliary dilatation.   for ercp tomorrow with possible sphincterotomy  s/p vitamin k with INR of 1.3 today  LFTs wnl. Electrolytes repleted  npo for procedure  f/u GI (Dr. Coyne), unlikely obstructive based on labs and symptoms  Cardiac cleared for ercp (dr. De Los Santos)  RCRI:2. she is medically optimized and classified intermediate risk for low to intermediate risk procedure CT abdomen showin mm common hepatic duct calculus with mild biliary dilatation.   for ercp tomorrow with possible sphincterotomy  s/p vitamin k with INR of 1.3 today  LFTs wnl. Electrolytes repleted  advance clears tonioght, npo for procedure tomorrow  unlikely obstructive based on labs and symptoms  Cardiology cleared for ercp (dr. De Los Santos)  RCRI:2. medically optimized and classified intermediate risk for low to intermediate risk procedure  Plan to resume AC in post-operative period depending on success of sphincterotomy/stone removal

## 2017-11-02 NOTE — DIETITIAN INITIAL EVALUATION ADULT. - PERTINENT MEDS FT
IV Vancomycin, IV Zosyn, Sodium Chloride 0.9% @ 100mL/hour, Potassium Chloride, Colace, Miralax, Senna, Protonix, MVI

## 2017-11-02 NOTE — DIETITIAN INITIAL EVALUATION ADULT. - PROBLEM SELECTOR PLAN 2
-Not a candidate for MRI due to neuro clips,  bone scan may yield false positive results due to ulcer, will treat medically at this time.

## 2017-11-02 NOTE — PROGRESS NOTE ADULT - PROBLEM SELECTOR PLAN 9
chronic. Currently controlled  continue with metoprolol, amlodipine, losartan with parameters  continue to monitor BP  DASH diet - npo since midnight management as above  also on losartan

## 2017-11-02 NOTE — PROGRESS NOTE ADULT - ASSESSMENT
82F PMHx Afib on Coumadin, HLD, CVA, MVP, OA, HTN, PVD (s/p RLE bypassx3, most recently 2012), Neuropathy, GERD presents with pain and swelling to right second toe a/w RLE cellulitis, r/o osteomyelitis. Found to have symptomatic anemia from acute blood loss, suspect GI source.      -Patient converted back to NS today. Continue Cardizem 60mg q6. Patient seems to go into a fib RVR before procedures, could be component of anxiety.   -No evidence of acute ischemia or meaningful volume overload  -Currently no active cardiac conditions. No signs of ischemia, ADHF, clinical exam not consistent with stenotic valvular disease. Optimal from a cardiovascular condition to proceed with low risk procedure. Routine hemodynamic monitoring is suggested during procedure.   -INR 1.30 s/p vitamin K, plan for ERCP tomorrow. To resume after.  -Continue to trend H/H  -Last TTE: shows normal LV function  -Abx per primary team  -Continue amlodipine 5 QD  -Continue losartan 50 qd  -Continue statin drug  -Continue metoprolol 100 BID  -To follow closely with you 82F PMHx Afib on Coumadin, HLD, CVA, MVP, OA, HTN, PVD (s/p RLE bypassx3, most recently 2012), Neuropathy, GERD presents with pain and swelling to right second toe a/w RLE cellulitis, r/o osteomyelitis. Found to have symptomatic anemia from acute blood loss, suspect GI source.      -Patient converted back to NS today. Continue Cardizem 60mg q6. Patient seems to go into a fib RVR before procedures, could be component of anxiety.   -No evidence of acute ischemia or meaningful volume overload  -Extensive history of PVD and stenotic valvular disease. Patient high risk but optimal from a cardiovascular condition to proceed with intermediate risk procedure. Routine hemodynamic monitoring is suggested during procedure.   -INR 1.30 s/p vitamin K, plan for ERCP tomorrow. To resume after.  -Continue to trend H/H  -Last TTE: shows normal LV function  -Abx per primary team  -Continue amlodipine 5 QD  -Continue losartan 50 qd  -Continue statin drug  -Continue metoprolol 100 BID  -To follow closely with you 82F PMHx Afib on Coumadin, HLD, CVA, MVP, OA, HTN, PVD (s/p RLE bypassx3, most recently 2012), Neuropathy, GERD presents with pain and swelling to right second toe a/w RLE cellulitis, r/o osteomyelitis. Found to have symptomatic anemia from acute blood loss, suspect GI source.      -Patient converted back to NS today. Continue Cardizem 60mg q6.    -No evidence of acute ischemia or meaningful volume overload  -Extensive history of PVD and stenotic valvular disease. Patient high risk but optimized from a cardiovascular condition to proceed with intermediate risk ERCP procedure. Routine hemodynamic monitoring is suggested during procedure.   -INR 1.30 s/p vitamin K, plan for ERCP tomorrow. To resume after.  -Continue to trend H/H  -Last TTE: shows normal LV function  -Abx per primary team  -Continue amlodipine 5 QD  -Continue losartan 50 qd  -Continue statin drug  -Continue metoprolol 100 BID  - Spoke with pt at length in regards to PAD and ischemic foot. I do not think that she is a good candidate for a 4th lower extremity bypass given her comorbidities and low chance of successful revascularization. It may be prudent to consider a AKA and   -To follow closely with you

## 2017-11-02 NOTE — DIETITIAN INITIAL EVALUATION ADULT. - PHYSICAL APPEARANCE
BMI 21.3; No edema noted; Skin WDL however pt found with right second toe venous ulcer present upon admission/well nourished

## 2017-11-02 NOTE — DIETITIAN INITIAL EVALUATION ADULT. - OTHER INFO
Pt seen for LOS. Reports good appetite however is currently NPO for procedure. Per flowsheet, pt noted with 50% po intake at two meals since admission. Denies nausea/emesis, abdominal pain, or diarrhea/constipation. Noted with BM x3 yesterday. Limited weight and nutrition related history available at this time as pt is unable to provide and no family is present at bedside. Pt noted with admission weight of 112.9 pounds however current weight (11/1) recorded as 123.4 pounds. Per prior RD note (8/29/2015) pt noted with admission weight of 124 pounds. Current weight suggests weight trending stable x2 years however admission weight suggests possible weight loss PTA. Pt confirms NKFA.

## 2017-11-02 NOTE — PROGRESS NOTE ADULT - PROBLEM SELECTOR PLAN 6
erythema is improved from markings from surgical pen at time of admission  continue vancomycin and zosyn  f/u vanc trough improving clinically  continue vancomycin and zosyn  f/u vanc trough

## 2017-11-02 NOTE — CHART NOTE - NSCHARTNOTEFT_GEN_A_CORE
Angiogram reviewed and shows  extensive PAD of her right leg with 3 occluded bypasses and occlusion of the right SFA, POPLITEAL , ANTERIOR TIBIAL, AND POSTERIOR TIBIAL ARTERIES. She has reconstitution of the tibial -peroneal artery  via collaterals that occludes at the ankle with a collateral to the distal  right PT artery. She is a very high  cardiac risk according to her cardiologist ( Dr. Laughlin). The chance of a successful 4th bypass is small and the best option may be a primary right AKA amputation . The patient wants to discuss her options with her primary vascular surgeon Dr. Araya. Will also discuss with Dr. CROWLEY who also performed a previous bypass.  The patient and daughter were given informed consent. The case was discussed with Dr. Parra.

## 2017-11-02 NOTE — PROGRESS NOTE ADULT - PROBLEM SELECTOR PLAN 2
continue vanco/zosyn for now. f/u vanc trough  blood cx and urine cx NTD  f/u podiatry (dr. Temple) for potential surgical procedure pending evaluation for GI bleed and vascular surgery  inflammatory markers suggestive of OM however cannot undergo MRI due to intracranial surgical clipping from 1980's  f/u vascular surgery (Dr. Sánchez) angiogram of RLE shows extensive disease. options discussed w/ vascular surgery and discussed briefly with the patient. To address further after ERCP  continue vanco/zosyn for now. f/u vanc trough  blood cx and urine cx NTD  vascular/ID/podiatry following

## 2017-11-02 NOTE — PROGRESS NOTE ADULT - PROBLEM SELECTOR PLAN 10
DVT ppx w/ ICD's (therapeutic INR at this time) hold AC for evaluation of anemia  GI ppx w/ PPI bid as per GI recs DVT ppx w/ ICD's  GI ppx w/ PPI

## 2017-11-02 NOTE — PROGRESS NOTE ADULT - SUBJECTIVE AND OBJECTIVE BOX
INTERVAL HPI/OVERNIGHT EVENTS:    denies abd pain, n/v  (+) BMs, no blood in stools    HPI:  82F PMHx Afib on Coumadin, HLD, CVA, MVP, OA, HTN, PVD (s/p RLE bypassx3, most recently 2012), Neuropathy, GERD presents with pain and swelling to right second toe that started 1 month ago. Patient states foot became red, painful, and a small wound with a scab started forming on the top portion of her second toe. It became extremely difficult to bear weight on the foot so she went to see Dr. Isaacs at the wound center and he drained the wound at which point, yellow pus drained from the wound. He prescribed her an antibiotic (she does not recall which one). She denies any fevers, chills, chesst pain or shortness of breat. Admits to joint pain due to her RA.     In ED, pt's vital was wnl. WBC 8.2, Hct: 26.1, lactate 0.8, , CMP revealed elevated bilirubin of 1.5, remainder is wnl , INR: 2.26. CXR showed no acute pathology. X ray of Rt foot showed possible small periarticular erosion medial aspect head of the proximal phalanx which might represent infection/ osteomyelitis. Pt received 1 dose  of  vanco and 1 dose of zosyn and 1 bolus of NS, BCX, UCX was sent in  ED. (28 Oct 2017 13:42)    MEDICATIONS  (STANDING):  atorvastatin 40 milliGRAM(s) Oral at bedtime  diltiazem    Tablet 60 milliGRAM(s) Oral every 6 hours  docusate sodium 100 milliGRAM(s) Oral three times a day  gabapentin 100 milliGRAM(s) Oral three times a day  losartan 50 milliGRAM(s) Oral daily  metoprolol     tartrate 100 milliGRAM(s) Oral two times a day  multivitamin 1 Tablet(s) Oral daily  pantoprazole  Injectable 40 milliGRAM(s) IV Push every 12 hours  piperacillin/tazobactam IVPB. 3.375 Gram(s) IV Intermittent every 8 hours  polyethylene glycol 3350 17 Gram(s) Oral daily  potassium chloride    Tablet ER 20 milliEquivalent(s) Oral two times a day  senna 2 Tablet(s) Oral at bedtime  sodium chloride 0.9%. 500 milliLiter(s) (100 mL/Hr) IV Continuous <Continuous>  vancomycin  IVPB 1250 milliGRAM(s) IV Intermittent every 24 hours    MEDICATIONS  (PRN):  acetaminophen   Tablet. 650 milliGRAM(s) Oral every 6 hours PRN Mild Pain (1 - 3)  lidocaine 5% Ointment 1 Application(s) Topical two times a day PRN neuropathic pain  oxyCODONE    5 mG/acetaminophen 325 mG 2 Tablet(s) Oral every 6 hours PRN Severe Pain (7 - 10)  traMADol 50 milliGRAM(s) Oral every 8 hours PRN Moderate Pain (4 - 6)      Allergies    No Known Allergies    Intolerances          General:  No wt loss, fevers, chills, night sweats, fatigue,   Eyes:  Good vision, no reported pain  ENT:  No sore throat, pain, runny nose, dysphagia  CV:  No pain, palpitations, hypo/hypertension  Resp:  No dyspnea, cough, tachypnea, wheezing  GI:  No pain, No nausea, No vomiting, No diarrhea, No constipation, No weight loss, No fever, No pruritis, No rectal bleeding, No tarry stools, No dysphagia,  :  No pain, bleeding, incontinence, nocturia  Muscle:  No pain, weakness  Neuro:  No weakness, tingling, memory problems  Psych:  No fatigue, insomnia, mood problems, depression  Endocrine:  No polyuria, polydipsia, cold/heat intolerance  Heme:  No petechiae, ecchymosis, easy bruisability  Skin:  No rash, tattoos, scars, edema      PHYSICAL EXAM:   Vital Signs:  Vital Signs Last 24 Hrs  T(C): 36.4 (02 Nov 2017 11:44), Max: 36.7 (01 Nov 2017 19:57)  T(F): 97.5 (02 Nov 2017 11:44), Max: 98 (01 Nov 2017 19:57)  HR: 77 (02 Nov 2017 11:44) (69 - 120)  BP: 145/78 (02 Nov 2017 11:44) (118/60 - 150/88)  BP(mean): --  RR: 18 (02 Nov 2017 11:44) (16 - 18)  SpO2: 92% (02 Nov 2017 11:44) (91% - 95%)  Daily     Daily I&O's Summary    01 Nov 2017 07:01  -  02 Nov 2017 07:00  --------------------------------------------------------  IN: 2034 mL / OUT: 0 mL / NET: 2034 mL        GENERAL:  Appears stated age, well-groomed, well-nourished, no distress  HEENT:  NC/AT,  conjunctivae clear and pink, no thyromegaly, nodules, adenopathy, no JVD, sclera -anicteric  CHEST:  Full & symmetric excursion, no increased effort, breath sounds clear  HEART:  Regular rhythm, S1, S2, no murmur/rub/S3/S4, no abdominal bruit, no edema  ABDOMEN:  Soft, non-tender, non-distended, normoactive bowel sounds,  no masses ,no hepato-splenomegaly, no signs of chronic liver disease  EXTEREMITIES:  no cyanosis,clubbing or edema  SKIN:  No rash/erythema/ecchymoses/petechiae/wounds/abscess/warm/dry  NEURO:  Alert, oriented, no asterixis, no tremor, no encephalopathy      LABS:                        10.0   10.6  )-----------( 270      ( 02 Nov 2017 07:11 )             32.0     11-02    143  |  111<H>  |  15  ----------------------------<  92  3.9   |  22  |  0.89    Ca    8.3<L>      02 Nov 2017 07:11  Phos  1.6     11-02  Mg     2.1     11-02      PT/INR - ( 02 Nov 2017 07:11 )   PT: 14.2 sec;   INR: 1.30 ratio         PTT - ( 01 Nov 2017 06:11 )  PTT:38.4 sec    amylase   lipase  RADIOLOGY & ADDITIONAL TESTS:

## 2017-11-02 NOTE — PROGRESS NOTE ADULT - PROBLEM SELECTOR PLAN 2
large common hepatic duct stone noted on imaging  biochemically/clinically does not appear to be obstructive currently  will plan for ERCP tomorrow  monitor liver enzymes

## 2017-11-02 NOTE — PROGRESS NOTE ADULT - PROBLEM SELECTOR PLAN 4
chronic. rates controlled today.   continue PO cardizem 60mg q6hrs with parameters   continue metoprolol with parameters  maintain HR < 110  Anticoagulation held due to anemia and evaluation for gi bleed  continue to monitor on Tele  f/u cardiology ( Dr. randhawa)   OOB with assistance (activity)   Fall precautions chronic. rates controlled today  continue PO cardizem 60mg q6hrs with parameters   continue metoprolol with parameters  maintain HR < 110  Anticoagulation held due to anemia and evaluation for GIB  continue to monitor on Tele  f/u cardiology (Dr. De Los Santos)

## 2017-11-02 NOTE — PROGRESS NOTE ADULT - SUBJECTIVE AND OBJECTIVE BOX
Interval History:  Remains weak and uncomfortable. continues with vascular evaluation  Chart reviewed and events noted;   Overnight events:    MEDICATIONS  (STANDING):  atorvastatin 40 milliGRAM(s) Oral at bedtime  diltiazem    Tablet 60 milliGRAM(s) Oral every 6 hours  docusate sodium 100 milliGRAM(s) Oral three times a day  gabapentin 100 milliGRAM(s) Oral three times a day  losartan 50 milliGRAM(s) Oral daily  metoprolol     tartrate 100 milliGRAM(s) Oral two times a day  multivitamin 1 Tablet(s) Oral daily  pantoprazole  Injectable 40 milliGRAM(s) IV Push every 12 hours  piperacillin/tazobactam IVPB. 3.375 Gram(s) IV Intermittent every 8 hours  polyethylene glycol 3350 17 Gram(s) Oral daily  potassium chloride    Tablet ER 20 milliEquivalent(s) Oral two times a day  senna 2 Tablet(s) Oral at bedtime  sodium chloride 0.9%. 500 milliLiter(s) (100 mL/Hr) IV Continuous <Continuous>  vancomycin  IVPB 1250 milliGRAM(s) IV Intermittent every 24 hours    MEDICATIONS  (PRN):  acetaminophen   Tablet. 650 milliGRAM(s) Oral every 6 hours PRN Mild Pain (1 - 3)  lidocaine 5% Ointment 1 Application(s) Topical two times a day PRN neuropathic pain  oxyCODONE    5 mG/acetaminophen 325 mG 2 Tablet(s) Oral every 6 hours PRN Severe Pain (7 - 10)  traMADol 50 milliGRAM(s) Oral every 8 hours PRN Moderate Pain (4 - 6)      Vital Signs Last 24 Hrs  T(C): 36.8 (02 Nov 2017 19:51), Max: 36.8 (02 Nov 2017 19:51)  T(F): 98.2 (02 Nov 2017 19:51), Max: 98.2 (02 Nov 2017 19:51)  HR: 68 (02 Nov 2017 19:51) (68 - 77)  BP: 130/76 (02 Nov 2017 19:51) (118/60 - 152/74)  BP(mean): --  RR: 17 (02 Nov 2017 19:51) (16 - 18)  SpO2: 92% (02 Nov 2017 19:51) (91% - 96%)    PHYSICAL EXAM  General: adult in NAD  HEENT: clear oropharynx, anicteric sclera, pink conjunctivae  Neck: supple  CV: normal S1S2 with no murmur rubs or gallops  Lungs: clear to auscultation, no wheezes, no rhales  Abdomen: soft non-tender non-distended, no hepato/splenomegaly  Ext: right foot with several toes showing necrotic changes  Skin: no rashes and no petichiae  Neuro: alert and oriented X3 no focal deficits      LABS:  CBC Full  -  ( 02 Nov 2017 07:11 )  WBC Count : 10.6 K/uL  Hemoglobin : 10.0 g/dL  Hematocrit : 32.0 %  Platelet Count - Automated : 270 K/uL  Mean Cell Volume : 93.4 fl  Mean Cell Hemoglobin : 29.2 pg  Mean Cell Hemoglobin Concentration : 31.3 gm/dL  Auto Neutrophil # : x  Auto Lymphocyte # : x  Auto Monocyte # : x  Auto Eosinophil # : x  Auto Basophil # : x  Auto Neutrophil % : x  Auto Lymphocyte % : x  Auto Monocyte % : x  Auto Eosinophil % : x  Auto Basophil % : x    11-02    x   |  x   |  x   ----------------------------<  x   4.2   |  x   |  x     Ca    8.3<L>      02 Nov 2017 07:11  Phos  1.6     11-02  Mg     2.1     11-02      PT/INR - ( 02 Nov 2017 07:11 )   PT: 14.2 sec;   INR: 1.30 ratio         PTT - ( 01 Nov 2017 06:11 )  PTT:38.4 sec    fe studies  Ferritin, Serum: 236.0 ng/mL (10-29 @ 11:05)  Iron - Total Binding Capacity.: 142 ug/dL (10-29 @ 11:05)      WBC trend  10.6 K/uL (11-02-17 @ 07:11)  13.3 K/uL (11-01-17 @ 06:11)  8.5 K/uL (10-31-17 @ 06:06)      Hgb trend  10.0 g/dL (11-02-17 @ 07:11)  9.5 g/dL (11-01-17 @ 06:11)  10.0 g/dL (10-31-17 @ 06:06)      plt trend  270 K/uL (11-02-17 @ 07:11)  297 K/uL (11-01-17 @ 06:11)  228 K/uL (10-31-17 @ 06:06)        RADIOLOGY & ADDITIONAL STUDIES:

## 2017-11-02 NOTE — PROGRESS NOTE ADULT - PROBLEM SELECTOR PLAN 5
likely source of abdominal pain - resolved s/p smog enema  CT abdomen had shown: Mild small and large bowel dilatation has developed with air-fluid levels suggesting a diarrheal state. Moderate fecal retention in the rectum with evidence of stercoral proctitis. 14 mm common hepatic duct calculus with mild biliary dilatation.   continue senna, colace and miralax  f/u abdominal flat/upright likely source of abdominal pain - resolved s/p smog enema  CT abdomen had shown: Mild small and large bowel dilatation has developed with air-fluid levels suggesting a diarrheal state. Moderate fecal retention in the rectum with evidence of stercoral proctitis. 14 mm common hepatic duct calculus with mild biliary dilatation.   continue senna, colace and miralax

## 2017-11-02 NOTE — PROGRESS NOTE ADULT - ASSESSMENT
82F PMHx Afib on Coumadin, HLD, CVA, MVP, OA, HTN, PVD (s/p RLE bypassx3, most recently 2012), Neuropathy, GERD presents with pain and swelling to right second toe a/w RLE cellulitis, r/o osteomyelitis. Found to have symptomatic anemia from acute blood loss, suspect GI source 82F PMHx Afib on Coumadin, HLD, CVA, MVP, OA, HTN, PVD (s/p RLE bypassx3, most recently 2012), Neuropathy, GERD presents with ischemic ulcer of R foot 2nd digit. Incidentally found w/ nonobstructing hepatic duct stone.

## 2017-11-02 NOTE — PROGRESS NOTE ADULT - SUBJECTIVE AND OBJECTIVE BOX
Patient is a 82y old  Female who presents with a chief complaint of weakness and pain both legs (28 Oct 2017 20:23)      HPI:  Overnight, the patient was still in A.fib, however, her rate was maintained lass than 110 bpm. Patient was seen today morning at the bedside and offers no complaints. She states that she feel better than yesterday. She denies any more constipation. 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  CVA (cerebral vascular accident) (434.91): (&quot;Mini-stroke&quot;,1990&#x27;s)  Rheumatoid arthritis (714.0)  Asthma (493.90)  Hyperlipidemia (272.4)  Hypertension (401.9)  S/P cataract surgery (Z98.49): (Left eye)  Elective surgery (Z41.9): (&quot;Twisted bowel&quot;, 2014)  Elective surgery (Z41.9): (Exision of cyst on liver, 1985)  S/P ORIF (open reduction internal fixation) fracture (V45.89): Left hip fx (2012) &amp; R hip fx (2013)  H/O cerebral aneurysm repair (V45.89): Brain clips (1978(  Renal stone (592.0): Cysto stent placement 10/1/2014  PVD (peripheral vascular disease) (443.9): s/p RLE bypass x 3, most recent 3/2012 Right external iliac to PT bypass w/ PTFE (2012)  S/P FRED (total abdominal hysterectomy) (V88.01): (1987, Hx of &quot;ovarian cancer?&quot;)      01 Nov 2017 07:01  -  02 Nov 2017 07:00  --------------------------------------------------------  IN: 2034 mL / OUT: 0 mL / NET: 2034 mL        REVIEW OF SYSTEMS:  CONSTITUTIONAL: No fever, weight loss, or fatigue  EYES: No eye pain, visual disturbances, or discharge  ENMT:  No difficulty hearing, tinnitus, vertigo; No sinus or throat pain  NECK: No pain or stiffness  BREASTS: No pain, no masses,   RESPIRATORY: No cough, wheezing, chills or hemoptysis; No shortness of breath  CARDIOVASCULAR: No chest pain, palpitations, dizziness, or leg swelling  GASTROINTESTINAL: No abdominal or epigastric pain. No nausea, vomiting, or hematemesis; No diarrhea or constipation. No melena or hematochezia.  GENITOURINARY: No dysuria, frequency, hematuria, or incontinence  NEUROLOGICAL: No headaches, memory loss, loss of strength, numbness, or tremors  SKIN: No itching, burning, rashes, or lesions   LYMPH NODES: No enlarged glands  MUSCULOSKELETAL: No joint pain or swelling; No muscle, back, or extremity pain  PSYCHIATRIC: No depression, anxiety, mood swings, or difficulty sleeping  ALLERY No hives or eczema    PHYSICAL EXAM:  GENERAL: NAD, well-groomed, well-developed  HEAD:  Atraumatic, Normocephalic  EYES: EOMI, PERRLA, conjunctiva and sclera clear  ENMT: No tonsillar erythema, exudates, or enlargement; Moist mucous membranes, Good dentition, No lesions  NECK: Supple, No JVD, Normal thyroid  NERVOUS SYSTEM:  Alert & Oriented X3, Good concentration; Motor Strength 5/5 B/L upper and lower extremities; DTRs 2+ intact and symmetric  CHEST/LUNG: Clear to percussion bilaterally; No rales, rhonchi, wheezing, or rubs  HEART: Regular rate and rhythm; No murmurs, rubs, or gallops  ABDOMEN: Soft, Nontender, Nondistended; Bowel sounds present  EXTREMITIES:  2+ Peripheral Pulses, No clubbing, cyanosis, or edema  LYMPH: No lymphadenopathy noted  SKIN: No rashes or lesions    MEDICATIONS  (STANDING):  atorvastatin 40 milliGRAM(s) Oral at bedtime  diltiazem    Tablet 60 milliGRAM(s) Oral every 6 hours  docusate sodium 100 milliGRAM(s) Oral three times a day  gabapentin 100 milliGRAM(s) Oral three times a day  losartan 50 milliGRAM(s) Oral daily  metoprolol     tartrate 100 milliGRAM(s) Oral two times a day  multivitamin 1 Tablet(s) Oral daily  pantoprazole  Injectable 40 milliGRAM(s) IV Push every 12 hours  piperacillin/tazobactam IVPB. 3.375 Gram(s) IV Intermittent every 8 hours  polyethylene glycol 3350 17 Gram(s) Oral daily  potassium chloride    Tablet ER 20 milliEquivalent(s) Oral two times a day  senna 2 Tablet(s) Oral at bedtime  sodium chloride 0.9%. 500 milliLiter(s) (100 mL/Hr) IV Continuous <Continuous>  vancomycin  IVPB 1250 milliGRAM(s) IV Intermittent every 24 hours    MEDICATIONS  (PRN):  acetaminophen   Tablet. 650 milliGRAM(s) Oral every 6 hours PRN Mild Pain (1 - 3)  lidocaine 5% Ointment 1 Application(s) Topical two times a day PRN neuropathic pain  oxyCODONE    5 mG/acetaminophen 325 mG 2 Tablet(s) Oral every 6 hours PRN Severe Pain (7 - 10)  traMADol 50 milliGRAM(s) Oral every 8 hours PRN Moderate Pain (4 - 6)      LABS:                        10.0   10.6  )-----------( 270      ( 02 Nov 2017 07:11 )             32.0     11-02    143  |  111<H>  |  15  ----------------------------<  92  3.9   |  22  |  0.89    Ca    8.3<L>      02 Nov 2017 07:11  Phos  1.6     11-02  Mg     2.1     11-02      PT/INR - ( 02 Nov 2017 07:11 )   PT: 14.2 sec;   INR: 1.30 ratio         PTT - ( 01 Nov 2017 06:11 )  PTT:38.4 sec    CAPILLARY BLOOD GLUCOSE Patient is a 82y old  Female who presents with a chief complaint of weakness and pain both legs (28 Oct 2017 20:23)      HPI:  Overnight, the patient was still in A.fib, however, her rate was maintained lass than 110 bpm. Patient was seen today morning at the bedside and offers no complaints. She states that she feel better than yesterday. She denies any more constipation. 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)  Osteoarthritis (715.90)  Afib (427.31)  PVD (peripheral vascular disease) (443.9)  Neuropathy (355.9): (Right lower leg)  Gout (274.9)  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  CVA (cerebral vascular accident) (434.91): (&quot;Mini-stroke&quot;,1990&#x27;s)  Rheumatoid arthritis (714.0)  Asthma (493.90)  Hyperlipidemia (272.4)  Hypertension (401.9)  S/P cataract surgery (Z98.49): (Left eye)  Elective surgery (Z41.9): (&quot;Twisted bowel&quot;, 2014)  Elective surgery (Z41.9): (Exision of cyst on liver, 1985)  S/P ORIF (open reduction internal fixation) fracture (V45.89): Left hip fx (2012) &amp; R hip fx (2013)  H/O cerebral aneurysm repair (V45.89): Brain clips (1978(  Renal stone (592.0): Cysto stent placement 10/1/2014  PVD (peripheral vascular disease) (443.9): s/p RLE bypass x 3, most recent 3/2012 Right external iliac to PT bypass w/ PTFE (2012)  S/P FRED (total abdominal hysterectomy) (V88.01): (1987, Hx of &quot;ovarian cancer?&quot;)      01 Nov 2017 07:01  -  02 Nov 2017 07:00  --------------------------------------------------------  IN: 2034 mL / OUT: 0 mL / NET: 2034 mL        REVIEW OF SYSTEMS:  CONSTITUTIONAL: denies fever, chills, fatigue, weakness  HEENT: denies blurred vision, sore throat  SKIN: denies new lesions, rash  CARDIOVASCULAR: denies chest pain, chest pressure, palpitations  RESPIRATORY: denies shortness of breath, sputum production  GASTROINTESTINAL: denies nausea, vomiting, diarrhea, abdominal pain, dyschezia  GENITOURINARY: denies dysuria, discharge  NEUROLOGICAL: denies numbness, headache, focal weakness  MUSCULOSKELETAL: denies new joint pain, muscle aches  PSYCH: denies anxiety depression at this time      PHYSICAL EXAM:  GENERAL: NAD,   HEAD:  Atraumatic, Normocephalic  EYES: EOMI, fixed dilated pupil on the left  NERVOUS SYSTEM:  Alert & Oriented X3, poor concentration; Motor Strength 5/5 B/L upper and lower extremities;   CHEST/LUNG: Clear to percussion bilaterally; No rales, rhonchi, wheezing, or rubs  HEART: irregular rate and rhythm; No murmurs, rubs, or gallops  ABDOMEN: Soft, Nontender, Nondistended; Bowel sounds present  EXTREMITIES:  trace Peripheral Pulses in bilateral lower extremities, b/l feet are warm, tender to palpation on the right foot at the toe    SKIN: right foot, 2nd toe lesion blackened with necrotic appearance at tip, with blueish discoloration, trace erythema distal to surgical pen marking markedly improved.      MEDICATIONS  (STANDING):  atorvastatin 40 milliGRAM(s) Oral at bedtime  diltiazem    Tablet 60 milliGRAM(s) Oral every 6 hours  docusate sodium 100 milliGRAM(s) Oral three times a day  gabapentin 100 milliGRAM(s) Oral three times a day  losartan 50 milliGRAM(s) Oral daily  metoprolol     tartrate 100 milliGRAM(s) Oral two times a day  multivitamin 1 Tablet(s) Oral daily  pantoprazole  Injectable 40 milliGRAM(s) IV Push every 12 hours  piperacillin/tazobactam IVPB. 3.375 Gram(s) IV Intermittent every 8 hours  polyethylene glycol 3350 17 Gram(s) Oral daily  potassium chloride    Tablet ER 20 milliEquivalent(s) Oral two times a day  senna 2 Tablet(s) Oral at bedtime  sodium chloride 0.9%. 500 milliLiter(s) (100 mL/Hr) IV Continuous <Continuous>  vancomycin  IVPB 1250 milliGRAM(s) IV Intermittent every 24 hours    MEDICATIONS  (PRN):  acetaminophen   Tablet. 650 milliGRAM(s) Oral every 6 hours PRN Mild Pain (1 - 3)  lidocaine 5% Ointment 1 Application(s) Topical two times a day PRN neuropathic pain  oxyCODONE    5 mG/acetaminophen 325 mG 2 Tablet(s) Oral every 6 hours PRN Severe Pain (7 - 10)  traMADol 50 milliGRAM(s) Oral every 8 hours PRN Moderate Pain (4 - 6)      LABS:                        10.0   10.6  )-----------( 270      ( 02 Nov 2017 07:11 )             32.0     11-02    143  |  111<H>  |  15  ----------------------------<  92  3.9   |  22  |  0.89    Ca    8.3<L>      02 Nov 2017 07:11  Phos  1.6     11-02  Mg     2.1     11-02      PT/INR - ( 02 Nov 2017 07:11 )   PT: 14.2 sec;   INR: 1.30 ratio         PTT - ( 01 Nov 2017 06:11 )  PTT:38.4 sec    CAPILLARY BLOOD GLUCOSE

## 2017-11-02 NOTE — DIETITIAN INITIAL EVALUATION ADULT. - PT NOT SOURCE
Pt is noted as confused/disoriented however was able to answer basic questions at time of interview. No family present at time of interview.

## 2017-11-02 NOTE — DIETITIAN INITIAL EVALUATION ADULT. - NS AS NUTRI INTERV MEDICAL AND FOOD SUPPLEMENTS
Recommend providing Ensure Pudding once daily to supplement po intake and ensure adequate protein energy intake.

## 2017-11-02 NOTE — DIETITIAN INITIAL EVALUATION ADULT. - PROBLEM SELECTOR PLAN 1
-admit to F  -Pain control with percocet for severe, and tramadol for moderate pain  -continue with Vanco and zosyn.  -consult Dr. Temple  -Consult ID: Dr. Will

## 2017-11-02 NOTE — PROGRESS NOTE ADULT - PROBLEM SELECTOR PLAN 3
likely 2/2 to GI bleed given history of bleeding GI ulcer   improving. continue to monitor H/h  continue to transfuse if hb< 7  continue protonix 40 mg ID IV  fobt negative for occult blood but minimal specimen obtained, I doubt this is true negative based on pt detailing h/o intermittent dark colored stool in her history  hold all AC, INR 1.30 s/p vitamin K in preparation for ERCP tomorrow  f/u GI (Dr. Coyne): for ERCP tomorrow  cardio cleared for ercp  heme/once (Dr. Diana): folate and b12 wnl, they will follow may be MDS considering how well she has responded to 1 unit of pRBC's  improving. continue to monitor H/h  continue to transfuse if hb< 7  continue protonix 40 mg BID IV  hold all AC, INR 1.30 s/p vitamin K in preparation for ERCP tomorrow  to resume tomorrow after ERCP if acceptable

## 2017-11-02 NOTE — PROGRESS NOTE ADULT - ASSESSMENT
81 yo woman with extensive history of vascular disease with arterial insufficiency in the LE's, previous history of GI bleed in 2015; has been seen by our group multiple times but has failed to follow up in office; now admitted for chronic right foot wound (likely osteomyelitis) and found to have anemia  - iron studies c/w chronic disease but cannot rule out GI blood loss with patient on chronic anticoagulation with coumadin as outpatient  - anticoagulants on hold for now  - sufficient Folate/B12 level  - advise supportive transfusion to maintain Hg >7 however likely will not improve until wound is well healed  - follow CBC daily  - vascular eval for popliteal artery occlusion

## 2017-11-03 ENCOUNTER — TRANSCRIPTION ENCOUNTER (OUTPATIENT)
Age: 82
End: 2017-11-03

## 2017-11-03 LAB
ALBUMIN SERPL ELPH-MCNC: 2.6 G/DL — LOW (ref 3.3–5)
ALP SERPL-CCNC: 83 U/L — SIGNIFICANT CHANGE UP (ref 40–120)
ALT FLD-CCNC: 19 U/L — SIGNIFICANT CHANGE UP (ref 12–78)
ANION GAP SERPL CALC-SCNC: 11 MMOL/L — SIGNIFICANT CHANGE UP (ref 5–17)
AST SERPL-CCNC: 15 U/L — SIGNIFICANT CHANGE UP (ref 15–37)
BILIRUB DIRECT SERPL-MCNC: 0.3 MG/DL — HIGH (ref 0.05–0.2)
BILIRUB INDIRECT FLD-MCNC: 0.7 MG/DL — SIGNIFICANT CHANGE UP (ref 0.2–1)
BILIRUB SERPL-MCNC: 1 MG/DL — SIGNIFICANT CHANGE UP (ref 0.2–1.2)
BUN SERPL-MCNC: 9 MG/DL — SIGNIFICANT CHANGE UP (ref 7–23)
CALCIUM SERPL-MCNC: 8.5 MG/DL — SIGNIFICANT CHANGE UP (ref 8.5–10.1)
CHLORIDE SERPL-SCNC: 108 MMOL/L — SIGNIFICANT CHANGE UP (ref 96–108)
CO2 SERPL-SCNC: 22 MMOL/L — SIGNIFICANT CHANGE UP (ref 22–31)
CREAT SERPL-MCNC: 0.81 MG/DL — SIGNIFICANT CHANGE UP (ref 0.5–1.3)
GLUCOSE SERPL-MCNC: 89 MG/DL — SIGNIFICANT CHANGE UP (ref 70–99)
HCT VFR BLD CALC: 34.4 % — LOW (ref 34.5–45)
HGB BLD-MCNC: 10.7 G/DL — LOW (ref 11.5–15.5)
INR BLD: 1.28 RATIO — HIGH (ref 0.88–1.16)
MAGNESIUM SERPL-MCNC: 2 MG/DL — SIGNIFICANT CHANGE UP (ref 1.6–2.6)
MCHC RBC-ENTMCNC: 28.7 PG — SIGNIFICANT CHANGE UP (ref 27–34)
MCHC RBC-ENTMCNC: 31.2 GM/DL — LOW (ref 32–36)
MCV RBC AUTO: 92 FL — SIGNIFICANT CHANGE UP (ref 80–100)
PHOSPHATE SERPL-MCNC: 2.6 MG/DL — SIGNIFICANT CHANGE UP (ref 2.5–4.5)
PLATELET # BLD AUTO: 286 K/UL — SIGNIFICANT CHANGE UP (ref 150–400)
POTASSIUM SERPL-MCNC: 4 MMOL/L — SIGNIFICANT CHANGE UP (ref 3.5–5.3)
POTASSIUM SERPL-SCNC: 4 MMOL/L — SIGNIFICANT CHANGE UP (ref 3.5–5.3)
PROT SERPL-MCNC: 7 G/DL — SIGNIFICANT CHANGE UP (ref 6–8.3)
PROTHROM AB SERPL-ACNC: 14 SEC — HIGH (ref 9.8–12.7)
RBC # BLD: 3.74 M/UL — LOW (ref 3.8–5.2)
RBC # FLD: 14.5 % — SIGNIFICANT CHANGE UP (ref 10.3–14.5)
SODIUM SERPL-SCNC: 141 MMOL/L — SIGNIFICANT CHANGE UP (ref 135–145)
WBC # BLD: 9.6 K/UL — SIGNIFICANT CHANGE UP (ref 3.8–10.5)
WBC # FLD AUTO: 9.6 K/UL — SIGNIFICANT CHANGE UP (ref 3.8–10.5)

## 2017-11-03 PROCEDURE — 99233 SBSQ HOSP IP/OBS HIGH 50: CPT

## 2017-11-03 PROCEDURE — 99233 SBSQ HOSP IP/OBS HIGH 50: CPT | Mod: GC

## 2017-11-03 RX ORDER — SODIUM CHLORIDE 9 MG/ML
1000 INJECTION, SOLUTION INTRAVENOUS
Qty: 0 | Refills: 0 | Status: DISCONTINUED | OUTPATIENT
Start: 2017-11-03 | End: 2017-11-03

## 2017-11-03 RX ADMIN — ATORVASTATIN CALCIUM 40 MILLIGRAM(S): 80 TABLET, FILM COATED ORAL at 21:19

## 2017-11-03 RX ADMIN — PIPERACILLIN AND TAZOBACTAM 25 GRAM(S): 4; .5 INJECTION, POWDER, LYOPHILIZED, FOR SOLUTION INTRAVENOUS at 13:43

## 2017-11-03 RX ADMIN — Medication 20 MILLIEQUIVALENT(S): at 21:19

## 2017-11-03 RX ADMIN — PIPERACILLIN AND TAZOBACTAM 25 GRAM(S): 4; .5 INJECTION, POWDER, LYOPHILIZED, FOR SOLUTION INTRAVENOUS at 06:16

## 2017-11-03 RX ADMIN — Medication 100 MILLIGRAM(S): at 06:16

## 2017-11-03 RX ADMIN — TRAMADOL HYDROCHLORIDE 50 MILLIGRAM(S): 50 TABLET ORAL at 06:24

## 2017-11-03 RX ADMIN — Medication 20 MILLIEQUIVALENT(S): at 06:16

## 2017-11-03 RX ADMIN — LOSARTAN POTASSIUM 50 MILLIGRAM(S): 100 TABLET, FILM COATED ORAL at 06:16

## 2017-11-03 RX ADMIN — Medication 1 MILLIGRAM(S): at 12:02

## 2017-11-03 RX ADMIN — Medication 166.67 MILLIGRAM(S): at 21:19

## 2017-11-03 RX ADMIN — Medication 100 MILLIGRAM(S): at 21:19

## 2017-11-03 RX ADMIN — GABAPENTIN 100 MILLIGRAM(S): 400 CAPSULE ORAL at 06:16

## 2017-11-03 RX ADMIN — PANTOPRAZOLE SODIUM 40 MILLIGRAM(S): 20 TABLET, DELAYED RELEASE ORAL at 06:16

## 2017-11-03 RX ADMIN — PIPERACILLIN AND TAZOBACTAM 25 GRAM(S): 4; .5 INJECTION, POWDER, LYOPHILIZED, FOR SOLUTION INTRAVENOUS at 21:20

## 2017-11-03 RX ADMIN — SODIUM CHLORIDE 100 MILLILITER(S): 9 INJECTION INTRAMUSCULAR; INTRAVENOUS; SUBCUTANEOUS at 06:17

## 2017-11-03 RX ADMIN — TRAMADOL HYDROCHLORIDE 50 MILLIGRAM(S): 50 TABLET ORAL at 07:00

## 2017-11-03 RX ADMIN — SENNA PLUS 2 TABLET(S): 8.6 TABLET ORAL at 21:19

## 2017-11-03 RX ADMIN — SODIUM CHLORIDE 75 MILLILITER(S): 9 INJECTION, SOLUTION INTRAVENOUS at 17:23

## 2017-11-03 RX ADMIN — GABAPENTIN 100 MILLIGRAM(S): 400 CAPSULE ORAL at 21:20

## 2017-11-03 NOTE — PROGRESS NOTE ADULT - ATTENDING COMMENTS
I personally conducted a physical examination of the patient. I personally gathered the patient's history. I edited the above listed findings which were prepared by the listed resident physician. I personally discussed the plan of care with the patient. The questions and concerns were addressed to the best of my ability. The patient is in agreement with the listed treatment plan.    A/P: 82F PMHx Afib on Coumadin, HLD, CVA, MVP, OA, HTN, PVD (s/p RLE bypassx3, most recently 2012), Neuropathy, GERD presents with ischemic ulcer of R foot 2nd digit. Incidentally found w/ nonobstructing hepatic duct stone. Scheduled for ERCP today.   CT angio shows concerning findings for complex disease in the RLE. I had discussion with vascular surgery. Pt will discuss w/ her vascular surgeon who has access to our angio findings. Will consider her options and discuss further w/ surgical team over the weekend.

## 2017-11-03 NOTE — PROGRESS NOTE ADULT - PROBLEM SELECTOR PLAN 7
CT angiography showing multiple sites of occlusion in b/l lower extremities  IR angiogram showing Multilevel occlusions involving the right common femoral   artery, the superficial femoral artery with collateral supplying vessel under the knee  As per Vascular surgery (Dr. Sánchez) and cardiology (Dr. Bañuelos group) chance of success at a 4th bypass is very small given her prior failed grafts and she is at high cardiac risk for the procedure. Plan to discuss options, including AKA with patients vascular (Dr. Parham)

## 2017-11-03 NOTE — PROGRESS NOTE ADULT - SUBJECTIVE AND OBJECTIVE BOX
Patient is a 82y old  Female who presents with a chief complaint of weakness and pain both legs (28 Oct 2017 20:23)      HPI:  Patient had no overnight events. As per telemetry monitor, she has been in sinus rhythm in the 70s throughout the night without issue. She was seen today AM at the bedside. She offers not complaints today. Denies fevers, sob, chest pain, palpitations nausea, vomiting, diarrhea, or abdominal pain.   As per nursing staff, she just had a bowel movement with no blood in it.    INTERVAL HPI/OVERNIGHT EVENTS:  T(C): 36.6 (11-03-17 @ 04:44), Max: 36.8 (11-02-17 @ 19:51)  HR: 102 (11-03-17 @ 04:44) (68 - 102)  BP: 144/92 (11-03-17 @ 04:44) (130/76 - 152/74)  RR: 16 (11-03-17 @ 04:44) (16 - 18)  SpO2: 94% (11-03-17 @ 04:44) (92% - 96%)  Wt(kg): --  I&O's Summary    02 Nov 2017 07:01  -  03 Nov 2017 07:00  --------------------------------------------------------  IN: 1200 mL / OUT: 0 mL / NET: 1200 mL    03 Nov 2017 07:01  -  03 Nov 2017 10:37  --------------------------------------------------------  IN: 300 mL / OUT: 0 mL / NET: 300 mL    PAST MEDICAL & SURGICAL HISTORY:  Mitral valve prolapse (I34.1)  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)  Osteoarthritis (715.90)  Afib (427.31)  PVD (peripheral vascular disease) (443.9)  Neuropathy (355.9): (Right lower leg)  Gout (274.9)  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  CVA (cerebral vascular accident) (434.91): (&quot;Mini-stroke&quot;,1990&#x27;s)  Rheumatoid arthritis (714.0)  Asthma (493.90)  Hyperlipidemia (272.4)  Hypertension (401.9)  S/P cataract surgery (Z98.49): (Left eye)  Elective surgery (Z41.9): (&quot;Twisted bowel&quot;, 2014)  Elective surgery (Z41.9): (Exision of cyst on liver, 1985)  S/P ORIF (open reduction internal fixation) fracture (V45.89): Left hip fx (2012) &amp; R hip fx (2013)  H/O cerebral aneurysm repair (V45.89): Brain clips (1978(  Renal stone (592.0): Cysto stent placement 10/1/2014  PVD (peripheral vascular disease) (443.9): s/p RLE bypass x 3, most recent 3/2012 Right external iliac to PT bypass w/ PTFE (2012)  S/P FRED (total abdominal hysterectomy)       REVIEW OF SYSTEMS:  CONSTITUTIONAL: denies fever, chills, fatigue, weakness  HEENT: denies blurred vision, sore throat  SKIN: denies new lesions, rash  CARDIOVASCULAR: denies chest pain, chest pressure, palpitations  RESPIRATORY: denies shortness of breath, sputum production  GASTROINTESTINAL: denies nausea, vomiting, diarrhea, abdominal pain, dyschezia  GENITOURINARY: denies dysuria, discharge  NEUROLOGICAL: denies numbness, headache, focal weakness  MUSCULOSKELETAL: denies new joint pain, muscle aches  PSYCH: denies anxiety depression at this time      PHYSICAL EXAM:  GENERAL: NAD,   HEAD:  Atraumatic, Normocephalic  EYES: EOMI, fixed dilated pupil on the left  NERVOUS SYSTEM:  Alert & Oriented X3, poor concentration; Motor Strength 5/5 B/L upper and lower extremities;   CHEST/LUNG: Clear to percussion bilaterally; No rales, rhonchi, wheezing, or rubs  HEART: irregular rate and rhythm; No murmurs, rubs, or gallops  ABDOMEN: Soft, Nontender, Nondistended; Bowel sounds present  EXTREMITIES:  trace Peripheral Pulses in bilateral lower extremities, b/l feet are warm, tender to palpation on the right foot at the toe    SKIN: right foot, 2nd toe lesion blackened with necrotic appearance at tip, with blueish discoloration, trace erythema distal to surgical pen marking markedly improved.    MEDICATIONS  (STANDING):  atorvastatin 40 milliGRAM(s) Oral at bedtime  diltiazem    Tablet 60 milliGRAM(s) Oral every 6 hours  docusate sodium 100 milliGRAM(s) Oral three times a day  gabapentin 100 milliGRAM(s) Oral three times a day  LORazepam     Tablet 1 milliGRAM(s) Oral once  losartan 50 milliGRAM(s) Oral daily  metoprolol     tartrate 100 milliGRAM(s) Oral two times a day  multivitamin 1 Tablet(s) Oral daily  pantoprazole  Injectable 40 milliGRAM(s) IV Push every 12 hours  piperacillin/tazobactam IVPB. 3.375 Gram(s) IV Intermittent every 8 hours  polyethylene glycol 3350 17 Gram(s) Oral daily  potassium chloride    Tablet ER 20 milliEquivalent(s) Oral two times a day  senna 2 Tablet(s) Oral at bedtime  sodium chloride 0.9%. 500 milliLiter(s) (100 mL/Hr) IV Continuous <Continuous>  vancomycin  IVPB 1250 milliGRAM(s) IV Intermittent every 24 hours    MEDICATIONS  (PRN):  acetaminophen   Tablet. 650 milliGRAM(s) Oral every 6 hours PRN Mild Pain (1 - 3)  lidocaine 5% Ointment 1 Application(s) Topical two times a day PRN neuropathic pain  oxyCODONE    5 mG/acetaminophen 325 mG 2 Tablet(s) Oral every 6 hours PRN Severe Pain (7 - 10)  traMADol 50 milliGRAM(s) Oral every 8 hours PRN Moderate Pain (4 - 6)      LABS:                        10.7   9.6   )-----------( 286      ( 03 Nov 2017 07:11 )             34.4     11-03    141  |  108  |  9   ----------------------------<  89  4.0   |  22  |  0.81    Ca    8.5      03 Nov 2017 07:11  Phos  2.6     11-03  Mg     2.0     11-03    TPro  7.0  /  Alb  2.6<L>  /  TBili  1.0  /  DBili  .30<H>  /  AST  15  /  ALT  19  /  AlkPhos  83  11-03    PT/INR - ( 03 Nov 2017 07:11 )   PT: 14.0 sec;   INR: 1.28 ratio             CAPILLARY BLOOD GLUCOSE                  RADIOLOGY & ADDITIONAL TESTS:    Imaging Personally Reviewed:       Advance Directives:      Palliative Care: Patient is a 82y old  Female who presents with a chief complaint of weakness and pain both legs (28 Oct 2017 20:23)      HPI:  Patient had no overnight events. As per telemetry monitor, she has been in sinus rhythm in the 70s throughout the night without issue. She was seen today AM at the bedside. She offers not complaints today. Denies fevers, sob, chest pain, palpitations nausea, vomiting, diarrhea, or abdominal pain. As per nursing staff, she just had a bowel movement with no blood in it.     INTERVAL HPI/OVERNIGHT EVENTS:  T(C): 36.6 (11-03-17 @ 04:44), Max: 36.8 (11-02-17 @ 19:51)  HR: 102 (11-03-17 @ 04:44) (68 - 102)  BP: 144/92 (11-03-17 @ 04:44) (130/76 - 152/74)  RR: 16 (11-03-17 @ 04:44) (16 - 18)  SpO2: 94% (11-03-17 @ 04:44) (92% - 96%)  Wt(kg): --  I&O's Summary    02 Nov 2017 07:01  -  03 Nov 2017 07:00  --------------------------------------------------------  IN: 1200 mL / OUT: 0 mL / NET: 1200 mL    03 Nov 2017 07:01  -  03 Nov 2017 10:37  --------------------------------------------------------  IN: 300 mL / OUT: 0 mL / NET: 300 mL    PAST MEDICAL & SURGICAL HISTORY:  Mitral valve prolapse (I34.1)  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)  Osteoarthritis (715.90)  Afib (427.31)  PVD (peripheral vascular disease) (443.9)  Neuropathy (355.9): (Right lower leg)  Gout (274.9)  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  CVA (cerebral vascular accident) (434.91): (&quot;Mini-stroke&quot;,1990&#x27;s)  Rheumatoid arthritis (714.0)  Asthma (493.90)  Hyperlipidemia (272.4)  Hypertension (401.9)  S/P cataract surgery (Z98.49): (Left eye)  Elective surgery (Z41.9): (&quot;Twisted bowel&quot;, 2014)  Elective surgery (Z41.9): (Exision of cyst on liver, 1985)  S/P ORIF (open reduction internal fixation) fracture (V45.89): Left hip fx (2012) &amp; R hip fx (2013)  H/O cerebral aneurysm repair (V45.89): Brain clips (1978(  Renal stone (592.0): Cysto stent placement 10/1/2014  PVD (peripheral vascular disease) (443.9): s/p RLE bypass x 3, most recent 3/2012 Right external iliac to PT bypass w/ PTFE (2012)  S/P FRED (total abdominal hysterectomy)       REVIEW OF SYSTEMS:  CONSTITUTIONAL: denies fever, chills, fatigue, weakness  HEENT: denies blurred vision, sore throat  SKIN: denies new lesions, rash  CARDIOVASCULAR: denies chest pain, chest pressure, palpitations  RESPIRATORY: denies shortness of breath, sputum production  GASTROINTESTINAL: denies nausea, vomiting, diarrhea, abdominal pain, dyschezia  GENITOURINARY: denies dysuria, discharge  NEUROLOGICAL: denies numbness, headache, focal weakness  MUSCULOSKELETAL: denies new joint pain, muscle aches  PSYCH: denies anxiety depression at this time      PHYSICAL EXAM:  GENERAL: NAD,   HEAD:  Atraumatic, Normocephalic  EYES: EOMI, fixed dilated pupil on the left  NERVOUS SYSTEM:  Alert & Oriented X3, poor concentration; Motor Strength 5/5 B/L upper and lower extremities;   CHEST/LUNG: Clear to percussion bilaterally; No rales, rhonchi, wheezing, or rubs  HEART: irregular rate and rhythm; No murmurs, rubs, or gallops  ABDOMEN: Soft, Nontender, Nondistended; Bowel sounds present  EXTREMITIES:  trace Peripheral Pulses in bilateral lower extremities, b/l feet are warm, tender to palpation on the right foot at the toe    SKIN: right foot, 2nd toe lesion blackened with necrotic appearance at tip, with blueish discoloration, trace erythema distal to surgical pen marking markedly improved.    MEDICATIONS  (STANDING):  atorvastatin 40 milliGRAM(s) Oral at bedtime  diltiazem    Tablet 60 milliGRAM(s) Oral every 6 hours  docusate sodium 100 milliGRAM(s) Oral three times a day  gabapentin 100 milliGRAM(s) Oral three times a day  LORazepam     Tablet 1 milliGRAM(s) Oral once  losartan 50 milliGRAM(s) Oral daily  metoprolol     tartrate 100 milliGRAM(s) Oral two times a day  multivitamin 1 Tablet(s) Oral daily  pantoprazole  Injectable 40 milliGRAM(s) IV Push every 12 hours  piperacillin/tazobactam IVPB. 3.375 Gram(s) IV Intermittent every 8 hours  polyethylene glycol 3350 17 Gram(s) Oral daily  potassium chloride    Tablet ER 20 milliEquivalent(s) Oral two times a day  senna 2 Tablet(s) Oral at bedtime  sodium chloride 0.9%. 500 milliLiter(s) (100 mL/Hr) IV Continuous <Continuous>  vancomycin  IVPB 1250 milliGRAM(s) IV Intermittent every 24 hours    MEDICATIONS  (PRN):  acetaminophen   Tablet. 650 milliGRAM(s) Oral every 6 hours PRN Mild Pain (1 - 3)  lidocaine 5% Ointment 1 Application(s) Topical two times a day PRN neuropathic pain  oxyCODONE    5 mG/acetaminophen 325 mG 2 Tablet(s) Oral every 6 hours PRN Severe Pain (7 - 10)  traMADol 50 milliGRAM(s) Oral every 8 hours PRN Moderate Pain (4 - 6)      LABS:                        10.7   9.6   )-----------( 286      ( 03 Nov 2017 07:11 )             34.4     11-03    141  |  108  |  9   ----------------------------<  89  4.0   |  22  |  0.81    Ca    8.5      03 Nov 2017 07:11  Phos  2.6     11-03  Mg     2.0     11-03    TPro  7.0  /  Alb  2.6<L>  /  TBili  1.0  /  DBili  .30<H>  /  AST  15  /  ALT  19  /  AlkPhos  83  11-03    PT/INR - ( 03 Nov 2017 07:11 )   PT: 14.0 sec;   INR: 1.28 ratio             CAPILLARY BLOOD GLUCOSE              no new radiologic imaging to review at this time. I reviewed/discussed imaging with vascular surgery in detail.

## 2017-11-03 NOTE — PROGRESS NOTE ADULT - ASSESSMENT
82F PMHx Afib on Coumadin, HLD, CVA, MVP, OA, HTN, PVD (s/p RLE bypassx3, most recently 2012), Neuropathy, GERD presents with ischemic ulcer of R foot 2nd digit. Incidentally found w/ nonobstructing hepatic duct stone.

## 2017-11-03 NOTE — PROGRESS NOTE ADULT - ASSESSMENT
82F PMHx Afib on Coumadin, HLD, CVA, MVP, OA, HTN, PVD (s/p RLE bypassx3, most recently 2012), Neuropathy, GERD presents with pain and swelling to right second toe a/w RLE cellulitis, r/o osteomyelitis. Found to have symptomatic anemia from acute blood loss, suspect GI source.    - Patient converted back to NS yesterday.  Remained on NSR overnight.  Continue Cardizem 60mg q6.    - No evidence of acute ischemia or meaningful volume overload  - Extensive history of PVD and stenotic valvular disease. Patient high risk but optimized from a cardiovascular condition to proceed with intermediate risk ERCP procedure. Routine hemodynamic monitoring is suggested during procedure and immediate postop.   - INR 1.30 s/p vitamin K, plan for ERCP today.   - Continue to trend H/H  - Last TTE in 2015: showed normal LV function (EF 65%) with mild to moderate MS and mild AI  - It will be prudent to have a repeat TTE to assess extent of valvular disease prior to ortho procedure.  - Abx per primary team  - Continue amlodipine 5 QD  - Continue losartan 50 qd  - Continue statin drug  - Continue metoprolol 100 BID  - Spoke with pt at length in regards to PAD and ischemic foot. I do not think that she is a good candidate for a 4th lower extremity bypass given her comorbidities and low chance of successful revascularization. It may be prudent to consider an AKA   - To follow closely with you     Isabel Quintero NP  Cardiology   82F PMHx Afib on Coumadin, HLD, CVA, MVP, OA, HTN, PVD (s/p RLE bypassx3, most recently 2012), Neuropathy, GERD presents with pain and swelling to right second toe a/w RLE cellulitis, r/o osteomyelitis. Found to have symptomatic anemia from acute blood loss, suspect GI source.      -Patient converted back to NS today. Continue Cardizem 60mg q6. Patient seems to go into a fib RVR before procedures, could be component of anxiety.   -No evidence of acute ischemia or meaningful volume overload  -Extensive history of PVD and stenotic valvular disease. Patient high risk but optimal from a cardiovascular condition to proceed with intermediate risk procedure. Routine hemodynamic monitoring is suggested during procedure.   -INR 1.30 s/p vitamin K, plan for ERCP tomorrow. To resume after.  -Continue to trend H/H  -Last TTE: shows normal LV function  -Abx per primary team  -Continue amlodipine 5 QD  -Continue losartan 50 qd  -Continue statin drug  -Continue metoprolol 100 BID  -To follow closely with you 82F PMHx Afib on Coumadin, HLD, CVA, MVP, OA, HTN, PVD (s/p RLE bypassx3, most recently 2012), Neuropathy, GERD presents with pain and swelling to right second toe a/w RLE cellulitis, r/o osteomyelitis. Found to have symptomatic anemia from acute blood loss, suspect GI source.    - Patient converted back to NS yesterday.  Remained on NSR overnight.  Continue Cardizem 60mg q6.    - No evidence of acute ischemia or meaningful volume overload  - Extensive history of PVD and stenotic valvular disease. Patient high risk but optimized from a cardiovascular condition to proceed with intermediate risk ERCP procedure. Routine hemodynamic monitoring is suggested during procedure and immediate postop.   - INR 1.30 s/p vitamin K, plan for ERCP today.  Please resume AC post ERCP once cleared.  Will opt for Heparin if considering AKA  - Continue to trend H/H  - Last TTE in 2015: showed normal LV function (EF 65%) with mild to moderate MS and mild AI  - It will be prudent to have a repeat TTE to assess extent of valvular disease prior to ortho procedure.  - Abx per primary team  - Continue amlodipine 5 QD  - Continue losartan 50 qd  - Continue statin drug  - Continue metoprolol 100 BID  - Spoke with pt at length in regards to PAD and ischemic foot. I do not think that she is a good candidate for a 4th lower extremity bypass given her comorbidities and low chance of successful revascularization. It may be prudent to consider an AKA   - To follow closely with you     Isabel Quintero NP  Cardiology   82F PMHx Afib on Coumadin, HLD, CVA, MVP, OA, HTN, PVD (s/p RLE bypassx3, most recently 2012), Neuropathy, GERD presents with pain and swelling to right second toe a/w RLE cellulitis, r/o osteomyelitis. Found to have symptomatic anemia from acute blood loss, suspect GI source.      -Patient converted back to NS today. Continue Cardizem 60mg q6. Patient seems to go into a fib RVR before procedures, could be component of anxiety.   -No evidence of acute ischemia or meaningful volume overload  -Extensive history of PVD and stenotic valvular disease. Patient high risk but optimal from a cardiovascular condition to proceed with intermediate risk procedure. Routine hemodynamic monitoring is suggested during procedure.   -INR 1.30 s/p vitamin K, plan for ERCP tomorrow. To resume after.  -Continue to trend H/H  -Last TTE: shows normal LV function  -Abx per primary team  -Continue amlodipine 5 QD  -Continue losartan 50 qd  -Continue statin drug  -Continue metoprolol 100 BID  -To follow closely with you 82F PMHx Afib on Coumadin, HLD, CVA, MVP, OA, HTN, PVD (s/p RLE bypassx3, most recently 2012), Neuropathy, GERD presents with pain and swelling to right second toe a/w RLE cellulitis, r/o osteomyelitis. Found to have symptomatic anemia from acute blood loss, suspect GI source.    - Remains on NSR overnight.  Continue Cardizem 60mg q6.    - No evidence of acute ischemia or meaningful volume overload  - Extensive history of PVD and stenotic valvular disease. Patient high risk but optimized from a cardiovascular condition to proceed with intermediate risk ERCP procedure. Routine hemodynamic monitoring is suggested during procedure and immediate postop.   - INR 1.30 s/p vitamin K, plan for ERCP today.  Please resume AC post ERCP once cleared.  Will opt for Heparin if considering AKA  - Continue to trend H/H  - Last TTE in 2015: showed normal LV function (EF 65%) with mild to moderate MS and mild AI  - It would be prudent to have a repeat TTE to assess extent of valvular disease prior to ortho procedure.  - Abx per primary team  - Continue amlodipine 5 QD  - Continue losartan 50 qd  - Continue statin drug  - Continue metoprolol 100 BID  - To follow closely with you     Isabel Quintero NP  Cardiology   82F PMHx Afib on Coumadin, HLD, CVA, MVP, OA, HTN, PVD (s/p RLE bypassx3, most recently 2012), Neuropathy, GERD presents with pain and swelling to right second toe a/w RLE cellulitis, r/o osteomyelitis. Found to have symptomatic anemia from acute blood loss, suspect GI source.      -Patient converted back to NS today. Continue Cardizem 60mg q6. Patient seems to go into a fib RVR before procedures, could be component of anxiety.   -No evidence of acute ischemia or meaningful volume overload  -Extensive history of PVD and stenotic valvular disease. Patient high risk but optimal from a cardiovascular condition to proceed with intermediate risk procedure. Routine hemodynamic monitoring is suggested during procedure.   -INR 1.30 s/p vitamin K, plan for ERCP tomorrow. To resume after.  -Continue to trend H/H  -Last TTE: shows normal LV function  -Abx per primary team  -Continue amlodipine 5 QD  -Continue losartan 50 qd  -Continue statin drug  -Continue metoprolol 100 BID  -To follow closely with you

## 2017-11-03 NOTE — PROGRESS NOTE ADULT - PROBLEM SELECTOR PLAN 3
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 continue to monitor H/h  continue to transfuse if hb< 7  continue protonix 40 mg BID IV  hold all AC  to resume after ERCP if acceptable

## 2017-11-03 NOTE — PROGRESS NOTE ADULT - PROBLEM SELECTOR PLAN 1
CT abdomen showin mm common hepatic duct calculus with mild biliary dilatation.   for ercp today with possible sphincterotomy  s/p vitamin k with INR of 1.28 today  LFTs wnl. Electrolytes repleted  npo for procedure   unlikely obstructive based on labs and symptoms  Cardiology cleared for ercp (dr. pina chen)  RCRI:2. medically optimized and classified intermediate risk for low to intermediate risk procedure  Plan to resume AC in post-operative period depending on success of sphincterotomy/stone removal

## 2017-11-03 NOTE — PROGRESS NOTE ADULT - SUBJECTIVE AND OBJECTIVE BOX
INTERVAL HX:   Remains weak and uncomfortable. continues with vascular evaluation, for surgery today at 2PM  Chart reviewed and events noted;   Overnight events:    MEDICATIONS  (STANDING):  atorvastatin 40 milliGRAM(s) Oral at bedtime  diltiazem    Tablet 60 milliGRAM(s) Oral every 6 hours  docusate sodium 100 milliGRAM(s) Oral three times a day  gabapentin 100 milliGRAM(s) Oral three times a day  losartan 50 milliGRAM(s) Oral daily  metoprolol     tartrate 100 milliGRAM(s) Oral two times a day  multivitamin 1 Tablet(s) Oral daily  pantoprazole  Injectable 40 milliGRAM(s) IV Push every 12 hours  piperacillin/tazobactam IVPB. 3.375 Gram(s) IV Intermittent every 8 hours  polyethylene glycol 3350 17 Gram(s) Oral daily  potassium chloride    Tablet ER 20 milliEquivalent(s) Oral two times a day  senna 2 Tablet(s) Oral at bedtime  sodium chloride 0.9%. 500 milliLiter(s) (100 mL/Hr) IV Continuous <Continuous>  vancomycin  IVPB 1250 milliGRAM(s) IV Intermittent every 24 hours    MEDICATIONS  (PRN):  acetaminophen   Tablet. 650 milliGRAM(s) Oral every 6 hours PRN Mild Pain (1 - 3)  lidocaine 5% Ointment 1 Application(s) Topical two times a day PRN neuropathic pain  oxyCODONE    5 mG/acetaminophen 325 mG 2 Tablet(s) Oral every 6 hours PRN Severe Pain (7 - 10)  traMADol 50 milliGRAM(s) Oral every 8 hours PRN Moderate Pain (4 - 6)        Vital Signs Last 24 Hrs  T(C): 36.8 (03 Nov 2017 13:40), Max: 36.8 (02 Nov 2017 19:51)  T(F): 98.2 (03 Nov 2017 13:40), Max: 98.2 (02 Nov 2017 19:51)  HR: 85 (03 Nov 2017 13:40) (68 - 102)  BP: 137/79 (03 Nov 2017 13:40) (130/76 - 144/92)  BP(mean): --  RR: 16 (03 Nov 2017 13:40) (16 - 17)  SpO2: 94% (03 Nov 2017 13:40) (92% - 94%)      PHYSICAL EXAM  General: adult in NAD  HEENT: clear oropharynx, anicteric sclera, pink conjunctivae  Neck: supple  CV: normal S1S2 with no murmur rubs or gallops  Lungs: clear to auscultation, no wheezes, no rhales  Abdomen: soft non-tender non-distended, no hepato/splenomegaly  Ext: right foot with middle toe showing necrotic changes, LE erythema better  Skin: no rashes and no petichiae  Neuro: alert and oriented X3 no focal deficits      LABS:  CBC Full  -  ( 03 Nov 2017 07:11 )  WBC Count : 9.6 K/uL  Hemoglobin : 10.7 g/dL  Hematocrit : 34.4 %  Platelet Count - Automated : 286 K/uL  Mean Cell Volume : 92.0 fl  Mean Cell Hemoglobin : 28.7 pg  Mean Cell Hemoglobin Concentration : 31.2 gm/dL  Auto Neutrophil # : x  Auto Lymphocyte # : x  Auto Monocyte # : x  Auto Eosinophil # : x  Auto Basophil # : x  Auto Neutrophil % : x  Auto Lymphocyte % : x  Auto Monocyte % : x  Auto Eosinophil % : x  Auto Basophil % : x      CBC Full  -  ( 02 Nov 2017 07:11 )  WBC Count : 10.6 K/uL  Hemoglobin : 10.0 g/dL  Hematocrit : 32.0 %  Platelet Count - Automated : 270 K/uL  Mean Cell Volume : 93.4 fl  Mean Cell Hemoglobin : 29.2 pg  Mean Cell Hemoglobin Concentration : 31.3 gm/dL  Auto Neutrophil # : x  Auto Lymphocyte # : x  Auto Monocyte # : x  Auto Eosinophil # : x  Auto Basophil # : x  Auto Neutrophil % : x  Auto Lymphocyte % : x  Auto Monocyte % : x  Auto Eosinophil % : x  Auto Basophil % : x    11-03  141  |  108  |  9   ----------------------------<  89  4.0   |  22  |  0.81  Ca    8.5      03 Nov 2017 07:11  Phos  2.6     11-03  Mg     2.0     11-03  TPro  7.0  /  Alb  2.6<L>  /  TBili  1.0  /  DBili  .30<H>  /  AST  15  /  ALT  19  /  AlkPhos  83  11-03 11-02  x   |  x   |  x   ----------------------------<  x   4.2   |  x   |  x   Ca    8.3<L>      02 Nov 2017 07:11  Phos  1.6     11-02  Mg     2.1     11-02  PT/INR - ( 02 Nov 2017 07:11 )   PT: 14.2 sec;   INR: 1.30 ratio    PTT - ( 01 Nov 2017 06:11 )  PTT: 38.4 sec    fe studies  Ferritin, Serum: 236.0 ng/mL (10-29 @ 11:05)  Iron - Total Binding Capacity.: 142 ug/dL (10-29 @ 11:05)  Vitamin B12, Serum (10.31.17 @ 08:14)    Vitamin B12, Serum: 418 pg/mL    Folate, Serum (10.31.17 @ 08:14)    Folate, Serum: >20.0 ng/mL        WBC trend  10.6 K/uL (11-02-17 @ 07:11)  13.3 K/uL (11-01-17 @ 06:11)  8.5 K/uL (10-31-17 @ 06:06)      Hgb trend  10.0 g/dL (11-02-17 @ 07:11)  9.5 g/dL (11-01-17 @ 06:11)  10.0 g/dL (10-31-17 @ 06:06)      plt trend  270 K/uL (11-02-17 @ 07:11)  297 K/uL (11-01-17 @ 06:11)  228 K/uL (10-31-17 @ 06:06)        RADIOLOGY & ADDITIONAL STUDIES:

## 2017-11-03 NOTE — PROGRESS NOTE ADULT - PROBLEM SELECTOR PLAN 2
angiogram of RLE shows extensive disease. options discussed w/ vascular surgery and discussed briefly with the patient. Recommends holding digital amputation on right lower extremity at this time due to poor healing capability from poor blood flow. To address further after ERCP  continue vanco/zosyn for now. f/u vanc trough  blood cx and urine cx NTD  vascular/ID/podiatry following

## 2017-11-03 NOTE — PROGRESS NOTE ADULT - SUBJECTIVE AND OBJECTIVE BOX
CARITO CONSTANTINO is a 82yFemale , patient examined and chart reviewed. Patient seen and examined today being followed for infected right 2nd toe with OM       INTERVAL HPI/ OVERNIGHT EVENTS: Events noted, for ERCP today . Await vascular follow up , results  of angiogram noted .    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  Osteoarthritis  Afib  PVD (peripheral vascular disease)  Neuropathy: (Right lower leg)  H/O cerebral aneurysm repair: brain clips  CVA (cerebral vascular accident): (&quot;Mini-stroke&quot;,1990&#x27;s)  Rheumatoid arthritis  Hyperlipidemia  Hypertension  S/P cataract surgery: (Left eye)  Elective surgery: (&quot;Twisted bowel&quot;, 2014)  Elective surgery: (Exision of cyst on liver, 1985)  S/P ORIF (open reduction internal fixation) fracture: Left hip fx (2012) &amp; R hip fx (2013)  H/O cerebral aneurysm repair: Brain clips (1978(  Renal stone: Cysto stent placement 10/1/2014  PVD (peripheral vascular disease): s/p RLE bypass x 3, most recent 3/2012 Right external iliac to PT bypass w/ PTFE (2012)  S/P FRED (total abdominal hysterectomy): (1987, Hx of &quot;ovarian cancer?&quot;)      For details regarding the patient's social history, family history, and other miscellaneous elements, please refer the initial infectious diseases consultation and/or the admitting history and physical examination for this admission.      ROS:  CONSTITUTIONAL:  Negative fever or chills, feels well, good appetite  EYES:  Negative  blurry vision or double vision  CARDIOVASCULAR:  Negative for chest pain or palpitations  RESPIRATORY:  Negative for cough, wheezing, or SOB   GASTROINTESTINAL:  Negative for nausea, vomiting, diarrhea, constipation, or abdominal pain  GENITOURINARY:  Negative frequency, urgency or dysuria  NEUROLOGIC:  No headache, confusion, dizziness, lightheadedness  All other systems were reviewed and are negative         Current inpatient medications :    ANTIBIOTICS/RELEVANT:  piperacillin/tazobactam IVPB. 3.375 Gram(s) IV Intermittent every 8 hours  vancomycin  IVPB 1250 milliGRAM(s) IV Intermittent every 24 hours      acetaminophen   Tablet. 650 milliGRAM(s) Oral every 6 hours PRN  atorvastatin 40 milliGRAM(s) Oral at bedtime  diltiazem    Tablet 60 milliGRAM(s) Oral every 6 hours  docusate sodium 100 milliGRAM(s) Oral three times a day  gabapentin 100 milliGRAM(s) Oral three times a day  lidocaine 5% Ointment 1 Application(s) Topical two times a day PRN  losartan 50 milliGRAM(s) Oral daily  metoprolol     tartrate 100 milliGRAM(s) Oral two times a day  multivitamin 1 Tablet(s) Oral daily  oxyCODONE    5 mG/acetaminophen 325 mG 2 Tablet(s) Oral every 6 hours PRN  pantoprazole  Injectable 40 milliGRAM(s) IV Push every 12 hours  polyethylene glycol 3350 17 Gram(s) Oral daily  potassium chloride    Tablet ER 20 milliEquivalent(s) Oral two times a day  senna 2 Tablet(s) Oral at bedtime  sodium chloride 0.9%. 500 milliLiter(s) IV Continuous <Continuous>  traMADol 50 milliGRAM(s) Oral every 8 hours PRN      Objective:    11-02 @ 07:01  -  11-03 @ 07:00  --------------------------------------------------------  IN: 1200 mL / OUT: 0 mL / NET: 1200 mL    11-03 @ 07:01  -  11-03 @ 16:22  --------------------------------------------------------  IN: 300 mL / OUT: 0 mL / NET: 300 mL      T(C): 36.7 (11-03-17 @ 15:50), Max: 36.8 (11-02-17 @ 19:51)  HR: 79 (11-03-17 @ 15:50) (68 - 102)  BP: 148/87 (11-03-17 @ 15:50) (130/76 - 148/87)  RR: 18 (11-03-17 @ 15:50) (16 - 18)  SpO2: 96% (11-03-17 @ 15:50) (92% - 96%)  Wt(kg): --      Physical Exam:  General: well developed well nourished, in no acute distress  Eyes: sclera anicteric, pupils equal and reactive to light  ENMT: buccal mucosa moist, pharynx not injected  Neck: supple, trachea midline  Lungs: clear, no wheeze/rhonchi  Cardiovascular: regular rate and rhythm, S1 S2  Abdomen: soft, nontender, no organomegaly present, bowel sounds normal  Neurological: alert and oriented x3, Cranial Nerves II-XII grossly intact  Skin: no increased ecchymosis/petechiae/purpura  Lymph Nodes: no palpable cervical/supraclavicular lymph nodes enlargements  Extremities: no cyanosis/clubbing/edema            LABS:                          10.7   9.6   )-----------( 286      ( 03 Nov 2017 07:11 )             34.4       11-03    141  |  108  |  9   ----------------------------<  89  4.0   |  22  |  0.81    Ca    8.5      03 Nov 2017 07:11  Phos  2.6     11-03  Mg     2.0     11-03    TPro  7.0  /  Alb  2.6<L>  /  TBili  1.0  /  DBili  .30<H>  /  AST  15  /  ALT  19  /  AlkPhos  83  11-03      PT/INR - ( 03 Nov 2017 07:11 )   PT: 14.0 sec;   INR: 1.28 ratio             Vancomycin Level, Trough: 17.2 ug/mL (11-02 @ 17:12)        RADIOLOGY & ADDITIONAL STUDIES:  IR Aortogram Abd/Eitan Iliofemoral (11.02.17 @ 10:49) >    INTERPRETATION: Aortic injection showed ectasia of the upper portion of   the infrarenal abdominal aorta with mild ectasia of the common iliac   arteries and occlusion of the right common femoral artery at the level of   the inguinal ligament. Collateral vessels reconstitute an isolated   segment of the mid right superficial femoral artery. There is also   reconstitution of an isolated segment of the above-knee popliteal artery.   Below the right knee, collateral vessels reconstitute the tibioperoneal   trunk via retrograde flow through the anterior tibial artery. The   posterior tibial artery is occluded. At the midpoint of the right leg,   the anterior tibial artery reconstitutes via collaterals and is not seen   beyond the ankle joint.  The peroneal artery is the remaining single   vessel runoff vessel of the right leg. . Above the ankle, it sends a   large collateral to reconstitute the posterior tibial artery above the   ankle. The dorsalis pedis artery was not seen.    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    Assessment :    82 y.o. female with multiple medical problems Afib on Coumadin, HLD, CVA, MVP, OA, HTN, PVD (s/p RLE bypassx3, most recently 2012), Neuropathy, GERD  presents with  ischemic right foot and leg coupled with gangrene of right 2nd. toe.  She has multiple failed bypasses of right leg. 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 Her pain is out of proportion to the toe infection and likely is related to ischemic pain right leg .    She is scheduled for angiogram of right leg as recommended by vascular surgery , podiatry still feels the gangrenous toe should be amputated and then try HBOT     She has fecal impaction , most likely cause of abdominal pain. She is scheduled for ERCP and sphincterotomy for the hepatic bile duct stone  Also needs angiogram of right leg     Plan :   - continue with Iv antibiotics for now , pending vascular and podiatry follow up    - vascular surgery follow up to see if patient cleared for toe amputation   - follow ERCP findings   - trend renal function and CBC  - will discuss with podiatry about conservative treatment for now      Continue with present regime .  Appropriate use of antibiotics and adverse effects reviewed.    I have discussed the above plan of care with patient and  family in detail. They expressed understanding of the  treatment plan . Risks, benefits and alternatives discussed in detail. I have asked if they have any questions or concerns and appropriately addressed them to the best of my ability .    > 15 minutes were spent in direct patient care reviewing notes, medications ,labs data/ imaging , discussion with multidisciplinary team.    Thank you for allowing me to participate in care of your patient .    Judi Coyne MD  [phone]

## 2017-11-03 NOTE — PROGRESS NOTE ADULT - PROBLEM SELECTOR PLAN 1
has history of GI bleed and has been on coumadin.   AC on hold .     Pending GI workup including EGD, colonoscopy , ERCP ( biliary stone) Dr Coyne  now with significant PAD being evaluated by vascular surgery. anticoagulation continues on hold.  may require AKA. anticoagulation risks to be address by vascular, cardiology and GI.     transfuse PRBC as clinically indicated if symptomatic, or if hgb <7.

## 2017-11-03 NOTE — PROGRESS NOTE ADULT - SUBJECTIVE AND OBJECTIVE BOX
HPI:  82F PMHx Afib on Coumadin, HLD, CVA, MVP, OA, HTN, PVD (s/p RLE bypassx3, most recently ), Neuropathy, GERD presents with pain and swelling to right second toe that started 1 month ago. Patient states foot became red, painful, and a small wound with a scab started forming on the top portion of her second toe. It became extremely difficult to bear weight on the foot so she went to see Dr. Isaacs at the wound center and he drained the wound at which point, yellow pus drained from the wound. He prescribed her an antibiotic (she does not recall which one). She denies any fevers, chills, chesst pain or shortness of breat. Admits to joint pain due to her RA.     In ED, pt's vital was wnl. WBC 8.2, Hct: 26.1, lactate 0.8, , CMP revealed elevated bilirubin of 1.5, remainder is wnl , INR: 2.26. CXR showed no acute pathology. X ray of Rt foot showed possible small periarticular erosion medial aspect head of the proximal phalanx which might represent infection/ osteomyelitis. Pt received 1 dose  of  vanco and 1 dose of zosyn and 1 bolus of NS, BCX, UCX was sent in  ED. (28 Oct 2017 13:42)    SUBJECTIVE:  Patient is a 82y old  Female who presents with a chief complaint of weakness and pain both legs (28 Oct 2017 20:23)    Awake but lethargic.  States, she feels very tired.  c/o right foot pain.  Denies CP, palpitations, SOB or orthopnea.  Comfortable on RA.    OBJECTIVE:  Review Of Systems:  Constitutional: [ ] Fever [ ] Chills [ ] Fatigue [ ] Weight change   HEENT: [ ] Blurred vision [ ] Eye Pain [ ] Headache [ ] Runny nose [ ] Sore Throat   Respiratory: [ ] Cough [ ] Wheezing [ ] Shortness of breath  Cardiovascular: [ ] Chest Pain [ ] Palpitations [ ] CRABTREE [ ] PND [ ] Orthopnea  Gastrointestinal: [ ] Abdominal Pain [ ] Diarrhea [ ] Constipation [ ] Hemorrhoids [ ] Nausea [ ] Vomiting  Genitourinary: [ ] Nocturia [ ] Dysuria [ ] Incontinence  Extremities: [ ] Swelling [ ] Joint Pain  Neurologic: [ ] Focal deficit [ ] Paresthesias [ ] Syncope  Lymphatic: [ ] Swelling [ ] Lymphadenopathy   Skin: [ ] Rash [ ] Ecchymoses [ ] Wounds [ ] Lesions  Psychiatry: [ ] Depression [ ] Suicidal/Homicidal Ideation [ ] Anxiety [ ] Sleep Disturbances  [x ] 10 point review of systems is otherwise negative except as mentioned above            [ ]Unable to obtain    Allergy:  Allergies    No Known Allergies    Intolerances    Medications:  MEDICATIONS  (STANDING):  atorvastatin 40 milliGRAM(s) Oral at bedtime  diltiazem    Tablet 60 milliGRAM(s) Oral every 6 hours  docusate sodium 100 milliGRAM(s) Oral three times a day  gabapentin 100 milliGRAM(s) Oral three times a day  losartan 50 milliGRAM(s) Oral daily  metoprolol     tartrate 100 milliGRAM(s) Oral two times a day  multivitamin 1 Tablet(s) Oral daily  pantoprazole  Injectable 40 milliGRAM(s) IV Push every 12 hours  piperacillin/tazobactam IVPB. 3.375 Gram(s) IV Intermittent every 8 hours  polyethylene glycol 3350 17 Gram(s) Oral daily  potassium chloride    Tablet ER 20 milliEquivalent(s) Oral two times a day  senna 2 Tablet(s) Oral at bedtime  sodium chloride 0.9%. 500 milliLiter(s) (100 mL/Hr) IV Continuous <Continuous>  vancomycin  IVPB 1250 milliGRAM(s) IV Intermittent every 24 hours    MEDICATIONS  (PRN):  acetaminophen   Tablet. 650 milliGRAM(s) Oral every 6 hours PRN Mild Pain (1 - 3)  lidocaine 5% Ointment 1 Application(s) Topical two times a day PRN neuropathic pain  oxyCODONE    5 mG/acetaminophen 325 mG 2 Tablet(s) Oral every 6 hours PRN Severe Pain (7 - 10)  traMADol 50 milliGRAM(s) Oral every 8 hours PRN Moderate Pain (4 - 6)    PMH/PSH/FH/SH: [ ] Unchanged    Vitals:  T(C): 36.6 (17 @ 04:44), Max: 36.8 (17 @ 19:51)  HR: 102 (17 @ 04:44) (68 - 102)  BP: 144/92 (17 @ 04:44) (130/76 - 152/74)  BP(mean): --  RR: 16 (17 @ 04:44) (16 - 18)  SpO2: 94% (17 @ 04:44) (92% - 96%)  Wt(kg): --  Daily     Daily Weight in k.2 (2017 15:36)  I&O's Summary    Labs:                        10.7   9.6   )-----------( 286      ( 2017 07:11 )             34.4         x   |  x   |  x   ----------------------------<  x   4.2   |  x   |  x     Ca    8.3<L>      2017 07:11  Phos  1.6       Mg     2.1         PT/INR - ( 2017 07:11 )   PT: 14.2 sec;   INR: 1.30 ratio       ECG:  < from: 12 Lead ECG (10.28.17 @ 13:32) >    Ventricular Rate 81 BPM    Atrial Rate 81 BPM    P-R Interval 172 ms    QRS Duration 96 ms    Q-T Interval 416 ms    QTC Calculation(Bezet) 483 ms    P Axis 16 degrees    R Axis -34 degrees    T Axis 8 degrees    Diagnosis Line Normal sinus rhythm  Left axis deviation  Abnormal ECG  When compared with ECG of 2017 11:52,  No significant change was found  Confirmed by Evens GILBERT, Campbell ([id number]) on 10/29/2017 1:49:49 PM    < end of copied text >    Echo:  < from: TTE Echo Doppler w/o Cont (07.15.15 @ 19:02) >     EXAM:  ECHO TTE W/O CON COMP W/DOPPLR           PROCEDURE DATE:  07/15/2015      INTERPRETATION:  Ordering Physician: JARRETT BURNETT    Indication: Abnormal EKG    Study Quality: Technically difficult   A complete echocardiographic study was performed utilizing standard   protocol including spectral and color Doppler in all echocardiographic   windows.    Height: 155 cm  Weight: 63 kg  BSA: 1.62 sq m  Blood Pressure: 140/68    MEASUREMENTS  IVS: 0.9cm  PWT: 0.8cm  LA: 4.8cm  AO: 3.0cm  LVIDd: 4.9cm  LVIDs: 3.2cm    LVEF: 65%  RVSP: 30mmHg    FINDINGS  Left Ventricle: The endocardium is not well-visualized. Grossly, there   appears to be preserved left ventricular systolic function.   Aortic Valve: Calcified trileaflet aortic valve. Mild aortic   insufficiency.  Mitral Valve: Mitral annular calcification and thickened mitral valve   leaflets with normal diastolic opening. Mild to moderate mitral   insufficiency.  Tricuspid Valve: Mild tricuspid insufficiency.  Pulmonic Valve: No pulmonic insufficiency.  Left Atrium: Moderate left atrial enlargement. Intra-atrial septum is   hypermobile.  Right Ventricle: The right ventricle is not well visualized. Grossly, it   appears to be normal in size with preserved systolic function.  Right Atrium: Normal  Diastolic Function: Normal  Pericardium/Pleura: Normal pericardium with no pericardial effusion.   Small bilateral pleural effusions.    ANNY FAY M.D., ATTENDING CARDIOLOGIST  This document has been electronically signed. 2015 12:26P        < end of copied text >    Stress Testing:     Cath:    Imaging:  < from: CT Angio Abdomen and Pelvis w/ IV Cont (10.30.17 @ 18:33) >    EXAM:  CT ANGIO LWR EXT (W)AW IC BI                          EXAM:  CT ANGIO ABD PELV (W)AW IC                            PROCEDURE DATE:  10/30/2017          INTERPRETATION:  CT ANGIO ABD PELV (W)AW IC, CT ANGIO LWR EXT (W)AW IC BI    HISTORY:  Atherosclerosis of the native arteries of the lower extremities   right>left legs, right second toe ulcer, gangrene, ischemic right foot    TECHNIQUE: CT angiography of the abdominal aorta and arterial runoff of   the lower extremities is performed. Thestudy is performed utilizing a   rapid bolus of 95 cc Omnipaque 350 IV contrast. MIP images for CT   angiographic display are created from source data on an independent 3-D   workstation and archived into PACS. This study was performed using   automatic exposure control (radiation dose reduction software) to obtain   a diagnostic image quality scan with patient dose as low as reasonably   achievable.    COMPARISON: Lower extremity arterial Doppler 2017, 2017, CT   abdomen and pelvis 2015    FINDINGS:     ABDOMINAL AORTA: Atherosclerotic plaque without occlusion. 3.7 x 2.7 cm   fusiform infrarenal abdominal aortic aneurysm.     Celiac: Normal. 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  SMA: Normal.  DEVAN: Normal.  Renal Arteries: Single bilateral. Normal caliber patent.    RIGHT LEG:    Common Iliac artery: Normal.  External Iliac: Severe calcified plaque without occlusion.  Internal Iliac: Normal.    Common femoral: Occluded.  SFA: Occluded.  Profunda: Patent.    3 distal bypass grafts from the right external iliac and common femoral   are completely occluded.    Popliteal: Occluded.  Anterior Tibial (AT): Occluded proximally, reconstituted distally but   severely attenuated.  Tibioperoneal trunk (TPT): Occluded.  Posterior Tibial (PT): Severely attenuated with multifocal short segment   occlusions. Distal segment supplied by the peroneal.  Peroneal: Diffusely attenuated but patent.  Dorsalis Pedis (DP): Severely attenuated but patent.  Plantar: Patent.    LEFT LEG:    Common Iliac artery: Normal.  External Iliac: Normal.  Internal Iliac: Normal.    Common femoral: Normal.  SFA: Moderate to severe multifocal disease, but patent.  Profunda: Normal.    Popliteal: Severe multifocal disease, but patent.  AT: Severely attenuated but patent.  TPT: Diffusely calcified but patent.  PT: Severely attenuated but patent.  Peroneal: Severely attenuated but patent.  DP: Patent.  Plantar: Severely attenuated but patent.      ADDITIONAL FINDINGS: Small right pleural effusion and bibasilar lung   atelectasis. The visualized portions of the liver, spleen, pancreas are   normal. Multiple gallstones. 14 mm common bile duct calculus is noted.   The kidneys and remainder of the retroperitoneum are normal aside from a   nonobstructing right lower pole calculus and bilateral extrarenal pelvis.   No pelvic abnormalities are seen. No bowel related abnormality. Status   post bilateral longstem femoral intramedullary rods. No deep lower   extremity soft tissue ulcer or asymmetric soft tissue edema.      3D image post processing was helpful in evaluating the vascular anatomy,   supporting the findings stated above.        IMPRESSION:     AORTOILIAC INFLOW: 3.7 cm fusiform infrarenal abdominal aortic aneurysm.   No evidence of aortoiliac occlusive disease.    RIGHT LEG: Complete occlusion of the native femoral-popliteal arteries. 3   right external iliac/common femoral-distal bypass grafts are completely   occluded. Three-vessel runoff to the foot is severely attenuated but   patent. Multifocal short segment posterior tibial artery occlusions.   Distal segment supplied by peroneal collaterals. DP is severely   attenuated but patent. Plantar is patent.    LEFT LEG: Patent femoral-popliteal arteries with moderate to severe   multifocal disease. Three-vessel runoff to the foot is severely   attenuated but patent. DP and plantar are patent.    OTHER: 14 mm upper common bile duct calculus without significant biliary   dilatation. Correlation with LFTs is recommended.    GRAZYNA GAGNON M.D., ATTENDING RADIOLOGIST  This document has been electronically signed. Oct 31 2017  9:28AM      < end of copied text     < from: IR Angiogram Extremity, Right (17 @ 10:40) >    EXAM:  IR PROCEDURE NON PICC                          EXAM:  US GUIDANCE VASCULAR ACCESS                          EXAM:  SP ANGIO EXTREMITY RT SI                          EXAM:  SP FLUOROSCOPY TO 1 HOUR                          EXAM:  SP AORTOGRM ABD W RUNOFF BI SI                            PROCEDURE DATE:  2017          INTERPRETATION:  Abdominal aortogram and right femoral angiogram via left   groin    HISTORY: Ischemic right foot with previous right leg bypass procedures   which were found to be occluded and the patient's recent CTA examination.   Catheter angiogram was requested to better visualize small distal vessels.    PROCEDURE: The patient was brought to the interventional suite and placed   in a supine position. Physiologic monitoring and oxygen saturation were   assessed throughout the procedure.  A time out was performed to verify   that the correct patient was present for the appropriate procedure.    The patient's left groin region was prepped and draped in the usual   sterile fashion. Ultrasound evaluation of the left common femoral artery   showed the artery to the patient. A stored sonographic image was sent to   the hospital's PACS system for permanent archiving.    After injection of local anesthesia (Lidocaine 1%), single wall puncture   of the left common femoral artery was performed. Guidewire and catheter   exchanges were made to advance a 4 Kosovan straight catheter which was   advanced over a guidewire to the level of the abdominal aorta and aorto  iliac angiography was performed. Exchange was then made for a 4 Kosovan   sheath followed by a 4 Kosovan Sos selective catheter which was formed in   the upper abdominal aorta and retracted over guidewire to selectively   catheterize the right commoniliac artery and right leg angiography was   performed at multiple levels along the right leg.    The catheter and sheath were removed and digital pressure was applied.   The patient tolerated the procedure well and was discharged from the   interventional suite in stable condition.    INTERPRETATION: Aortic injection showed ectasia of the upper portion of   the infrarenal abdominal aorta with mild ectasia of the common iliac   arteries and occlusion of the right common femoral artery at the level of   the inguinal ligament. Collateral vessels reconstitute an isolated   segment of the mid right superficial femoral artery. There is also   reconstitution of an isolated segment of the above-knee popliteal artery.   Below the right knee, collateral vessels reconstitute the tibioperoneal   trunk via retrograde flow through the anterior tibial artery. The   posterior tibial artery is occluded. At the midpoint of the right leg,   the anterior tibial artery reconstitutes via collaterals and is not seen   beyond the ankle joint.  The peroneal artery is the remaining single   vessel runoff vessel of the right leg. . Above the ankle, it sends a   large collateral to reconstitute the posterior tibial artery above the   ankle. The dorsalis pedis artery was not seen.    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    Thank you for the courtesy of this referral.    TREY ROOT M.D., ATTENDING RADIOLOGIST  This document has been electronically signed. 2017  2:05PM    < end of copied text >    Interpretation of Telemetry: NSR at 80's    Physical Exam:  Appearance: [ ] Normal  [ ] abnormal [x ] NAD   Eyes: [x ] PERRL [ ] EOMI  HENT: [x ] Normal [ ] Abnormal oral muscosa [ ]NC/AT  Cardiovascular: [x ] S1 [x ] S2 [x ] RRR [x ] m/r/g  III/VI  [ ]edema [ ] JVP  Procedural Access Site: [ ]  hematoma [ ] tender to palpation [ ] 2+ pulse [ ] bruit [ ] Ecchymosis  Respiratory: [x ] Clear to auscultation bilaterally  Gastrointestinal: [x ] Soft [ ] tenderness[ ] distension [x ] BS  Musculoskeletal: [ ] clubbing [ ] joint deformity [x}  Right slightly cooler than left with ischemic changes on toes  Neurologic: [ ] Non-focal  Lymphatic: [ ] lymphadenopathy  Psychiatry: [x ] AAOx3  [ ] confused [ ] disoriented [ ] Mood & affect appropriate  Skin: [ ]  rashes [ ] ecchymoses [ ] cyanosis  [x] Pale

## 2017-11-03 NOTE — PROGRESS NOTE ADULT - PROBLEM SELECTOR PLAN 4
chronic. rates controlled today  continue cardizem and metoprolol with parameters  maintain HR < 110  Anticoagulation held due to anemia and evaluation for GIB  continue to monitor on Tele  f/u cardiology (Dr. De Los Santos)

## 2017-11-04 LAB
ANION GAP SERPL CALC-SCNC: 12 MMOL/L — SIGNIFICANT CHANGE UP (ref 5–17)
APTT BLD: 33.2 SEC — SIGNIFICANT CHANGE UP (ref 27.5–37.4)
BUN SERPL-MCNC: 13 MG/DL — SIGNIFICANT CHANGE UP (ref 7–23)
CALCIUM SERPL-MCNC: 8.6 MG/DL — SIGNIFICANT CHANGE UP (ref 8.5–10.1)
CHLORIDE SERPL-SCNC: 105 MMOL/L — SIGNIFICANT CHANGE UP (ref 96–108)
CO2 SERPL-SCNC: 21 MMOL/L — LOW (ref 22–31)
CREAT SERPL-MCNC: 0.9 MG/DL — SIGNIFICANT CHANGE UP (ref 0.5–1.3)
GLUCOSE SERPL-MCNC: 114 MG/DL — HIGH (ref 70–99)
HCT VFR BLD CALC: 37.5 % — SIGNIFICANT CHANGE UP (ref 34.5–45)
HGB BLD-MCNC: 11.7 G/DL — SIGNIFICANT CHANGE UP (ref 11.5–15.5)
INR BLD: 1.22 RATIO — HIGH (ref 0.88–1.16)
MAGNESIUM SERPL-MCNC: 1.9 MG/DL — SIGNIFICANT CHANGE UP (ref 1.6–2.6)
MCHC RBC-ENTMCNC: 28.7 PG — SIGNIFICANT CHANGE UP (ref 27–34)
MCHC RBC-ENTMCNC: 31.2 GM/DL — LOW (ref 32–36)
MCV RBC AUTO: 91.9 FL — SIGNIFICANT CHANGE UP (ref 80–100)
PHOSPHATE SERPL-MCNC: 4 MG/DL — SIGNIFICANT CHANGE UP (ref 2.5–4.5)
PLATELET # BLD AUTO: 354 K/UL — SIGNIFICANT CHANGE UP (ref 150–400)
POTASSIUM SERPL-MCNC: 4.3 MMOL/L — SIGNIFICANT CHANGE UP (ref 3.5–5.3)
POTASSIUM SERPL-SCNC: 4.3 MMOL/L — SIGNIFICANT CHANGE UP (ref 3.5–5.3)
PROTHROM AB SERPL-ACNC: 13.3 SEC — HIGH (ref 9.8–12.7)
RBC # BLD: 4.08 M/UL — SIGNIFICANT CHANGE UP (ref 3.8–5.2)
RBC # FLD: 14.4 % — SIGNIFICANT CHANGE UP (ref 10.3–14.5)
SODIUM SERPL-SCNC: 138 MMOL/L — SIGNIFICANT CHANGE UP (ref 135–145)
WBC # BLD: 8.6 K/UL — SIGNIFICANT CHANGE UP (ref 3.8–10.5)
WBC # FLD AUTO: 8.6 K/UL — SIGNIFICANT CHANGE UP (ref 3.8–10.5)

## 2017-11-04 PROCEDURE — 99233 SBSQ HOSP IP/OBS HIGH 50: CPT

## 2017-11-04 PROCEDURE — 93306 TTE W/DOPPLER COMPLETE: CPT | Mod: 26

## 2017-11-04 RX ORDER — MAGNESIUM SULFATE 500 MG/ML
1 VIAL (ML) INJECTION ONCE
Qty: 0 | Refills: 0 | Status: COMPLETED | OUTPATIENT
Start: 2017-11-04 | End: 2017-11-04

## 2017-11-04 RX ORDER — LOSARTAN POTASSIUM 100 MG/1
50 TABLET, FILM COATED ORAL DAILY
Qty: 0 | Refills: 0 | Status: DISCONTINUED | OUTPATIENT
Start: 2017-11-04 | End: 2017-11-04

## 2017-11-04 RX ORDER — LOSARTAN POTASSIUM 100 MG/1
100 TABLET, FILM COATED ORAL DAILY
Qty: 0 | Refills: 0 | Status: DISCONTINUED | OUTPATIENT
Start: 2017-11-04 | End: 2017-11-09

## 2017-11-04 RX ORDER — LOSARTAN POTASSIUM 100 MG/1
25 TABLET, FILM COATED ORAL ONCE
Qty: 0 | Refills: 0 | Status: COMPLETED | OUTPATIENT
Start: 2017-11-04 | End: 2017-11-04

## 2017-11-04 RX ADMIN — Medication 166.67 MILLIGRAM(S): at 18:09

## 2017-11-04 RX ADMIN — Medication 100 MILLIGRAM(S): at 06:10

## 2017-11-04 RX ADMIN — SODIUM CHLORIDE 100 MILLILITER(S): 9 INJECTION INTRAMUSCULAR; INTRAVENOUS; SUBCUTANEOUS at 15:08

## 2017-11-04 RX ADMIN — Medication 100 MILLIGRAM(S): at 13:05

## 2017-11-04 RX ADMIN — PIPERACILLIN AND TAZOBACTAM 25 GRAM(S): 4; .5 INJECTION, POWDER, LYOPHILIZED, FOR SOLUTION INTRAVENOUS at 13:05

## 2017-11-04 RX ADMIN — LOSARTAN POTASSIUM 50 MILLIGRAM(S): 100 TABLET, FILM COATED ORAL at 06:10

## 2017-11-04 RX ADMIN — Medication 1 TABLET(S): at 11:44

## 2017-11-04 RX ADMIN — GABAPENTIN 100 MILLIGRAM(S): 400 CAPSULE ORAL at 13:05

## 2017-11-04 RX ADMIN — Medication 100 MILLIGRAM(S): at 22:09

## 2017-11-04 RX ADMIN — GABAPENTIN 100 MILLIGRAM(S): 400 CAPSULE ORAL at 06:11

## 2017-11-04 RX ADMIN — Medication 100 MILLIGRAM(S): at 17:59

## 2017-11-04 RX ADMIN — PIPERACILLIN AND TAZOBACTAM 25 GRAM(S): 4; .5 INJECTION, POWDER, LYOPHILIZED, FOR SOLUTION INTRAVENOUS at 22:10

## 2017-11-04 RX ADMIN — PIPERACILLIN AND TAZOBACTAM 25 GRAM(S): 4; .5 INJECTION, POWDER, LYOPHILIZED, FOR SOLUTION INTRAVENOUS at 06:12

## 2017-11-04 RX ADMIN — Medication 20 MILLIEQUIVALENT(S): at 17:59

## 2017-11-04 RX ADMIN — SODIUM CHLORIDE 100 MILLILITER(S): 9 INJECTION INTRAMUSCULAR; INTRAVENOUS; SUBCUTANEOUS at 00:01

## 2017-11-04 RX ADMIN — PANTOPRAZOLE SODIUM 40 MILLIGRAM(S): 20 TABLET, DELAYED RELEASE ORAL at 17:59

## 2017-11-04 RX ADMIN — Medication 20 MILLIEQUIVALENT(S): at 06:11

## 2017-11-04 RX ADMIN — LOSARTAN POTASSIUM 25 MILLIGRAM(S): 100 TABLET, FILM COATED ORAL at 11:50

## 2017-11-04 RX ADMIN — PANTOPRAZOLE SODIUM 40 MILLIGRAM(S): 20 TABLET, DELAYED RELEASE ORAL at 06:12

## 2017-11-04 RX ADMIN — SENNA PLUS 2 TABLET(S): 8.6 TABLET ORAL at 22:09

## 2017-11-04 RX ADMIN — ATORVASTATIN CALCIUM 40 MILLIGRAM(S): 80 TABLET, FILM COATED ORAL at 22:09

## 2017-11-04 RX ADMIN — GABAPENTIN 100 MILLIGRAM(S): 400 CAPSULE ORAL at 22:09

## 2017-11-04 RX ADMIN — Medication 100 MILLIGRAM(S): at 06:11

## 2017-11-04 RX ADMIN — Medication 100 GRAM(S): at 11:50

## 2017-11-04 NOTE — PROGRESS NOTE ADULT - PROBLEM SELECTOR PLAN 3
SMOG enema x 1 ordered  senna 2 tabs at bedtime, colace 100 mg tid, miralax 17 g daily  (+) BMs  needs EGD/Colon but bowel prep was poor, will prep w/ moviprep X 1 today and X 2 tomorrow as well as dulcolax

## 2017-11-04 NOTE — PROGRESS NOTE ADULT - SUBJECTIVE AND OBJECTIVE BOX
HPI:  82F PMHx Afib on Coumadin, HLD, CVA, MVP, OA, HTN, PVD (s/p RLE bypassx3, most recently ), Neuropathy, GERD presents with pain and swelling to right second toe that started 1 month ago. Patient states foot became red, painful, and a small wound with a scab started forming on the top portion of her second toe. It became extremely difficult to bear weight on the foot so she went to see Dr. Isaacs at the wound center and he drained the wound at which point, yellow pus drained from the wound. He prescribed her an antibiotic (she does not recall which one). She denies any fevers, chills, chesst pain or shortness of breat. Admits to joint pain due to her RA.     In ED, pt's vital was wnl. WBC 8.2, Hct: 26.1, lactate 0.8, , CMP revealed elevated bilirubin of 1.5, remainder is wnl , INR: 2.26. CXR showed no acute pathology. X ray of Rt foot showed possible small periarticular erosion medial aspect head of the proximal phalanx which might represent infection/ osteomyelitis. Pt received 1 dose  of  vanco and 1 dose of zosyn and 1 bolus of NS, BCX, UCX was sent in  ED. (28 Oct 2017 13:42)    SUBJECTIVE:  Patient is a 82y old  Female who presents with a chief complaint of weakness and pain both legs (28 Oct 2017 20:23)    More awake and alert this time.  Still c/o right foot pain when touched.  However, denies CP, palpitations, SOB, abdominal pain, nausea or vomiting.    OBJECTIVE:  Review Of Systems:  Constitutional: [ ] Fever [ ] Chills [ ] Fatigue [ ] Weight change   HEENT: [ ] Blurred vision [ ] Eye Pain [ ] Headache [ ] Runny nose [ ] Sore Throat   Respiratory: [ ] Cough [ ] Wheezing [ ] Shortness of breath  Cardiovascular: [ ] Chest Pain [ ] Palpitations [ ] CRABTREE [ ] PND [ ] Orthopnea  Gastrointestinal: [ ] Abdominal Pain [ ] Diarrhea [ ] Constipation [ ] Hemorrhoids [ ] Nausea [ ] Vomiting  Genitourinary: [ ] Nocturia [ ] Dysuria [ ] Incontinence  Extremities: [ ] Swelling [ ] Joint Pain  Neurologic: [ ] Focal deficit [ ] Paresthesias [ ] Syncope  Lymphatic: [ ] Swelling [ ] Lymphadenopathy   Skin: [ ] Rash [ ] Ecchymoses [ ] Wounds [ ] Lesions  Psychiatry: [ ] Depression [ ] Suicidal/Homicidal Ideation [ ] Anxiety [ ] Sleep Disturbances  [ x] 10 point review of systems is otherwise negative except as mentioned above            [ ]Unable to obtain    Allergy:  Allergies    No Known Allergies    Intolerances    Medications:  MEDICATIONS  (STANDING):  atorvastatin 40 milliGRAM(s) Oral at bedtime  diltiazem    Tablet 60 milliGRAM(s) Oral every 6 hours  docusate sodium 100 milliGRAM(s) Oral three times a day  gabapentin 100 milliGRAM(s) Oral three times a day  losartan 50 milliGRAM(s) Oral daily  magnesium sulfate  IVPB 1 Gram(s) IV Intermittent once  metoprolol     tartrate 100 milliGRAM(s) Oral two times a day  multivitamin 1 Tablet(s) Oral daily  pantoprazole  Injectable 40 milliGRAM(s) IV Push every 12 hours  piperacillin/tazobactam IVPB. 3.375 Gram(s) IV Intermittent every 8 hours  polyethylene glycol 3350 17 Gram(s) Oral daily  potassium chloride    Tablet ER 20 milliEquivalent(s) Oral two times a day  senna 2 Tablet(s) Oral at bedtime  sodium chloride 0.9%. 500 milliLiter(s) (100 mL/Hr) IV Continuous <Continuous>  vancomycin  IVPB 1250 milliGRAM(s) IV Intermittent every 24 hours    MEDICATIONS  (PRN):  acetaminophen   Tablet. 650 milliGRAM(s) Oral every 6 hours PRN Mild Pain (1 - 3)  lidocaine 5% Ointment 1 Application(s) Topical two times a day PRN neuropathic pain  oxyCODONE    5 mG/acetaminophen 325 mG 2 Tablet(s) Oral every 6 hours PRN Severe Pain (7 - 10)  traMADol 50 milliGRAM(s) Oral every 8 hours PRN Moderate Pain (4 - 6)    PMH/PSH/FH/SH: [ ] Unchanged    Vitals:  T(C): 36.9 (17 @ 05:17), Max: 36.9 (17 @ 23:52)  HR: 97 (17 @ 05:17) (73 - 97)  BP: 167/83 (17 @ 05:17) (134/69 - 167/83)  BP(mean): --  RR: 16 (17 @ 05:17) (13 - 18)  SpO2: 96% (17 @ 05:17) (94% - 99%)  Wt(kg): --  Daily Height in cm: 157.48 (2017 15:50)    Daily Weight in k.5 (2017 05:17)  I&O's Summary    2017 07:01  -  2017 07:00  --------------------------------------------------------  IN: 1850 mL / OUT: 0 mL / NET: 1850 mL    Labs:                        11.7   8.6   )-----------( 354      ( 2017 07:51 )             37.5         138  |  105  |  13  ----------------------------<  114<H>  4.3   |  21<L>  |  0.90    Ca    8.6      2017 07:51  Phos  4.0       Mg     1.9         TPro  7.0  /  Alb  2.6<L>  /  TBili  1.0  /  DBili  .30<H>  /  AST  15  /  ALT  19  /  AlkPhos  83  11-    PT/INR - ( 2017 07:51 )   PT: 13.3 sec;   INR: 1.22 ratio         PTT - ( 2017 07:51 )  PTT:33.2 sec      Magnesium, Serum: 1.9 mg/dL ( @ 07:51)  Phosphorus Level, Serum: 4.0 mg/dL ( @ 07:51)    ECG:  < from: 12 Lead ECG (10.28.17 @ 13:32) >    Ventricular Rate 81 BPM    Atrial Rate 81 BPM    P-R Interval 172 ms    QRS Duration 96 ms    Q-T Interval 416 ms    QTC Calculation(Bezet) 483 ms    P Axis 16 degrees    R Axis -34 degrees    T Axis 8 degrees    Diagnosis Line Normal sinus rhythm  Left axis deviation  Abnormal ECG  When compared with ECG of 2017 11:52,  No significant change was found  Confirmed by Campbell Horner MD ([id number]) on 10/29/2017 1:49:49 PM    < end of copied text >    Echo:  < from: TTE Echo Doppler w/o Cont (07.15.15 @ 19:02) >     EXAM:  ECHO TTE W/O CON COMP W/DOPPLR           PROCEDURE DATE:  07/15/2015      INTERPRETATION:  Ordering Physician: JARRETT BURNETT    Indication: Abnormal EKG    Study Quality: Technically difficult   A complete echocardiographic study was performed utilizing standard   protocol including spectral and color Doppler in all echocardiographic   windows.    Height: 155 cm  Weight: 63 kg  BSA: 1.62 sq m  Blood Pressure: 140/68    MEASUREMENTS  IVS: 0.9cm  PWT: 0.8cm  LA: 4.8cm  AO: 3.0cm  LVIDd: 4.9cm  LVIDs: 3.2cm    LVEF: 65%  RVSP: 30mmHg    FINDINGS  Left Ventricle: The endocardium is not well-visualized. Grossly, there   appears to be preserved left ventricular systolic function.   Aortic Valve: Calcified trileaflet aortic valve. Mild aortic   insufficiency.  Mitral Valve: Mitral annular calcification and thickened mitral valve   leaflets with normal diastolic opening. Mild to moderate mitral   insufficiency.  Tricuspid Valve: Mild tricuspid insufficiency.  Pulmonic Valve: No pulmonic insufficiency.  Left Atrium: Moderate left atrial enlargement. Intra-atrial septum is   hypermobile.  Right Ventricle: The right ventricle is not well visualized. Grossly, it   appears to be normal in size with preserved systolic function.  Right Atrium: Normal  Diastolic Function: Normal  Pericardium/Pleura: Normal pericardium with no pericardial effusion.   Small bilateral pleural effusions.     ANNY FAY M.D., ATTENDING CARDIOLOGIST  This document has been electronically signed. 2015 12:26P        < end of copied text >    Repeat TTE today, pending report    Stress Testing:     Cath:    Imaging:  < from: CT Angio Abdomen and Pelvis w/ IV Cont (10.30.17 @ 18:33) >    EXAM:  CT ANGIO LWR EXT (W)AW IC BI                          EXAM:  CT ANGIO ABD PELV (W)AW IC                            PROCEDURE DATE:  10/30/2017          INTERPRETATION:  CT ANGIO ABD PELV (W)AW IC, CT ANGIO LWR EXT (W)AW IC BI    HISTORY:  Atherosclerosis of the native arteries of the lower extremities   right>left legs, right second toe ulcer, gangrene, ischemic right foot    TECHNIQUE: CT angiography of the abdominal aorta and arterial runoff of   the lower extremities is performed. Thestudy is performed utilizing a   rapid bolus of 95 cc Omnipaque 350 IV contrast. MIP images for CT   angiographic display are created from source data on an independent 3-D   workstation and archived into PACS. This study was performed using   automatic exposure control (radiation dose reduction software) to obtain   a diagnostic image quality scan with patient dose as low as reasonably   achievable.    COMPARISON: Lower extremity arterial Doppler 2017, 2017, CT   abdomen and pelvis 2015    FINDINGS:     ABDOMINAL AORTA: Atherosclerotic plaque without occlusion. 3.7 x 2.7 cm   fusiform infrarenal abdominal aortic aneurysm.     Celiac: Normal. 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  SMA: Normal.  DEVAN: Normal.  Renal Arteries: Single bilateral. Normal caliber patent.    RIGHT LEG:    Common Iliac artery: Normal.  External Iliac: Severe calcified plaque without occlusion.  Internal Iliac: Normal.    Common femoral: Occluded.  SFA: Occluded.  Profunda: Patent.    3 distal bypass grafts from the right external iliac and common femoral   are completely occluded.    Popliteal: Occluded.  Anterior Tibial (AT): Occluded proximally, reconstituted distally but   severely attenuated.  Tibioperoneal trunk (TPT): Occluded.  Posterior Tibial (PT): Severely attenuated with multifocal short segment   occlusions. Distal segment supplied by the peroneal.  Peroneal: Diffusely attenuated but patent.  Dorsalis Pedis (DP): Severely attenuated but patent.  Plantar: Patent.    LEFT LEG:    Common Iliac artery: Normal.  External Iliac: Normal.  Internal Iliac: Normal.    Common femoral: Normal.  SFA: Moderate to severe multifocal disease, but patent.  Profunda: Normal.    Popliteal: Severe multifocal disease, but patent.  AT: Severely attenuated but patent.  TPT: Diffusely calcified but patent.  PT: Severely attenuated but patent.  Peroneal: Severely attenuated but patent.  DP: Patent.  Plantar: Severely attenuated but patent.      ADDITIONAL FINDINGS: Small right pleural effusion and bibasilar lung   atelectasis. The visualized portions of the liver, spleen, pancreas are   normal. Multiple gallstones. 14 mm common bile duct calculus is noted.   The kidneys and remainder of the retroperitoneum are normal aside from a   nonobstructing right lower pole calculus and bilateral extrarenal pelvis.   No pelvic abnormalities are seen. No bowel related abnormality. Status   post bilateral longstem femoral intramedullary rods. No deep lower   extremity soft tissue ulcer or asymmetric soft tissue edema.      3D image post processing was helpful in evaluating the vascular anatomy,   supporting the findings stated above.      IMPRESSION:     AORTOILIAC INFLOW: 3.7 cm fusiform infrarenal abdominal aortic aneurysm.   No evidence of aortoiliac occlusive disease.    RIGHT LEG: Complete occlusion of the native femoral-popliteal arteries. 3   right external iliac/common femoral-distal bypass grafts are completely   occluded. Three-vessel runoff to the foot is severely attenuated but   patent. Multifocal short segment posterior tibial artery occlusions.   Distal segment supplied by peroneal collaterals. DP is severely   attenuated but patent. Plantar is patent.    LEFT LEG: Patent femoral-popliteal arteries with moderate to severe   multifocal disease. Three-vessel runoff to the foot is severely   attenuated but patent. DP and plantar are patent.    OTHER: 14 mm upper common bile duct calculus without significant biliary   dilatation. Correlation with LFTs is recommended.    GRAZYNA GAGNON M.D., ATTENDING RADIOLOGIST  This document has been electronically signed. Oct 31 2017  9:28AM      < end of copied text >    Interpretation of Telemetry:  NSR at 80's w/ PVC's    Physical Exam:  Appearance: [x ] Normal  [ ] abnormal [x ] NAD   Eyes: [ x] PERRL [ ] EOMI  HENT: x[ ] Normal [ ] Abnormal oral muscosa [ ]NC/AT  Cardiovascular: [x ] S1 [x ] S2 [x ] RRR [x ] m/r/g IV/VI  [ ]edema [ ] JVP  Procedural Access Site: [ ]  hematoma [ ] tender to palpation [ ] 2+ pulse [ ] bruit [ ] Ecchymosis  Respiratory: [x ] Clear to auscultation bilaterally  Gastrointestinal: [ x] Soft [ ] tenderness[ ] distension [x ] BS  Musculoskeletal: [ ] clubbing [ ] joint deformity   Neurologic: [x] Non-focal  Lymphatic: [ ] lymphadenopathy  Psychiatry: [x ] AAOx3  [ ] confused [ ] disoriented [ x] Mood & affect appropriate  Skin: [ ]  rashes [ ] ecchymoses [ ] cyanosis

## 2017-11-04 NOTE — PROGRESS NOTE ADULT - SUBJECTIVE AND OBJECTIVE BOX
Patient is a 82y old  Female who presents with a chief complaint of weakness and pain both legs (28 Oct 2017 20:23)        HPI:  82F PMHx Afib on Coumadin, HLD, CVA, MVP, OA, HTN, PVD (s/p RLE bypassx3, most recently 2012), Neuropathy, GERD presents with pain and swelling to right second toe that started 1 month ago. Patient states foot became red, painful, and a small wound with a scab started forming on the top portion of her second toe. It became extremely difficult to bear weight on the foot so she went to see Dr. Isaacs at the wound center and he drained the wound at which point, yellow pus drained from the wound. He prescribed her an antibiotic (she does not recall which one). She denies any fevers, chills, chesst pain or shortness of breat. Admits to joint pain due to her RA.     In ED, pt's vital was wnl. WBC 8.2, Hct: 26.1, lactate 0.8, , CMP revealed elevated bilirubin of 1.5, remainder is wnl , INR: 2.26. CXR showed no acute pathology. X ray of Rt foot showed possible small periarticular erosion medial aspect head of the proximal phalanx which might represent infection/ osteomyelitis. Pt received 1 dose  of  vanco and 1 dose of zosyn and 1 bolus of NS, BCX, UCX was sent in  ED. (28 Oct 2017 13:42)      SUBJECTIVE & OBJECTIVE: Pt seen and examined at bedside.     PHYSICAL EXAM:  T(C): 36.9 (11-04-17 @ 05:17), Max: 36.9 (11-03-17 @ 23:52)  HR: 97 (11-04-17 @ 05:17) (73 - 97)  BP: 167/83 (11-04-17 @ 05:17) (134/69 - 167/83)  RR: 16 (11-04-17 @ 05:17) (13 - 18)  SpO2: 96% (11-04-17 @ 05:17) (94% - 99%)  Wt(kg): -- Height (cm): 157.48 (11-03 @ 15:50)  Weight (kg): [id number] (11-03 @ 15:50)  BMI (kg/m2): 20.7 (11-03 @ 15:50)  BSA (m2): 1.5 (11-03 @ 15:50)  GENERAL: NAD, well-groomed, well-developed  HEAD:  Atraumatic, Normocephalic  EYES: EOMI, PERRLA, conjunctiva and sclera clear  ENMT: Moist mucous membranes  NECK: Supple, No JVD  NERVOUS SYSTEM:  Alert & Oriented X3, Motor Strength 5/5 B/L upper and lower extremities; DTRs 2+ intact and symmetric  CHEST/LUNG: Clear to auscultation bilaterally; No rales, rhonchi, wheezing, or rubs  HEART: Regular rate and rhythm; No murmurs, rubs, or gallops  ABDOMEN: Soft, Nontender, Nondistended; Bowel sounds present  EXTREMITIES:  2+ Peripheral Pulses, No clubbing, cyanosis, or edema        MEDICATIONS  (STANDING):  atorvastatin 40 milliGRAM(s) Oral at bedtime  diltiazem    Tablet 60 milliGRAM(s) Oral every 6 hours  docusate sodium 100 milliGRAM(s) Oral three times a day  gabapentin 100 milliGRAM(s) Oral three times a day  metoprolol     tartrate 100 milliGRAM(s) Oral two times a day  multivitamin 1 Tablet(s) Oral daily  pantoprazole  Injectable 40 milliGRAM(s) IV Push every 12 hours  piperacillin/tazobactam IVPB. 3.375 Gram(s) IV Intermittent every 8 hours  polyethylene glycol 3350 17 Gram(s) Oral daily  potassium chloride    Tablet ER 20 milliEquivalent(s) Oral two times a day  senna 2 Tablet(s) Oral at bedtime  sodium chloride 0.9%. 500 milliLiter(s) (100 mL/Hr) IV Continuous <Continuous>  vancomycin  IVPB 1250 milliGRAM(s) IV Intermittent every 24 hours    MEDICATIONS  (PRN):  acetaminophen   Tablet. 650 milliGRAM(s) Oral every 6 hours PRN Mild Pain (1 - 3)  lidocaine 5% Ointment 1 Application(s) Topical two times a day PRN neuropathic pain  oxyCODONE    5 mG/acetaminophen 325 mG 2 Tablet(s) Oral every 6 hours PRN Severe Pain (7 - 10)  traMADol 50 milliGRAM(s) Oral every 8 hours PRN Moderate Pain (4 - 6)      LABS:                        11.7   8.6   )-----------( 354      ( 04 Nov 2017 07:51 )             37.5     11-04    138  |  105  |  13  ----------------------------<  114<H>  4.3   |  21<L>  |  0.90    Ca    8.6      04 Nov 2017 07:51  Phos  4.0     11-04  Mg     1.9     11-04    TPro  7.0  /  Alb  2.6<L>  /  TBili  1.0  /  DBili  .30<H>  /  AST  15  /  ALT  19  /  AlkPhos  83  11-03    PT/INR - ( 04 Nov 2017 07:51 )   PT: 13.3 sec;   INR: 1.22 ratio         PTT - ( 04 Nov 2017 07:51 )  PTT:33.2 sec    Magnesium, Serum: 1.9 mg/dL (11-04 @ 07:51)    CAPILLARY BLOOD GLUCOSE          CAPILLARY BLOOD GLUCOSE        CAPILLARY BLOOD GLUCOSE                RECENT CULTURES:      RADIOLOGY & ADDITIONAL TESTS:                        DVT/GI ppx  Discussed with pt @ bedside

## 2017-11-04 NOTE — PROGRESS NOTE ADULT - SUBJECTIVE AND OBJECTIVE BOX
INTERVAL HPI/OVERNIGHT EVENTS:    MEDICATIONS  (STANDING):  atorvastatin 40 milliGRAM(s) Oral at bedtime  diltiazem    Tablet 60 milliGRAM(s) Oral every 6 hours  docusate sodium 100 milliGRAM(s) Oral three times a day  gabapentin 100 milliGRAM(s) Oral three times a day  losartan 100 milliGRAM(s) Oral daily  metoprolol     tartrate 100 milliGRAM(s) Oral two times a day  multivitamin 1 Tablet(s) Oral daily  pantoprazole  Injectable 40 milliGRAM(s) IV Push every 12 hours  piperacillin/tazobactam IVPB. 3.375 Gram(s) IV Intermittent every 8 hours  polyethylene glycol 3350 17 Gram(s) Oral daily  potassium chloride    Tablet ER 20 milliEquivalent(s) Oral two times a day  senna 2 Tablet(s) Oral at bedtime  sodium chloride 0.9%. 500 milliLiter(s) (100 mL/Hr) IV Continuous <Continuous>  vancomycin  IVPB 1250 milliGRAM(s) IV Intermittent every 24 hours    MEDICATIONS  (PRN):  acetaminophen   Tablet. 650 milliGRAM(s) Oral every 6 hours PRN Mild Pain (1 - 3)  lidocaine 5% Ointment 1 Application(s) Topical two times a day PRN neuropathic pain  oxyCODONE    5 mG/acetaminophen 325 mG 2 Tablet(s) Oral every 6 hours PRN Severe Pain (7 - 10)  traMADol 50 milliGRAM(s) Oral every 8 hours PRN Moderate Pain (4 - 6)      Allergies    No Known Allergies    Intolerances        Review of Systems:    General:  No wt loss, fevers, chills, night sweats,fatigue,   Eyes:  Good vision, no reported pain  ENT:  No sore throat, pain, runny nose, dysphagia  CV:  No pain, palpitatioins, hypo/hypertension  Resp:  No dyspnea, cough, tachypnea, wheezing  GI:  No pain, No nausea, No vomiting, No diarrhea, No constipatiion, No weight loss, No fever, No pruritis, No rectal bleeding, No tarry stools, No dysphagia,  :  No pain, bleeding, incontinence, nocturia  Muscle:  No pain, weakness  Neuro:  No weakness, tingling, memory problems  Psych:  No fatigue, insomnia, mood problems, depression  Endocrine:  No polyuria, polydypsia, cold/heat intolerance  Heme:  No petechiae, ecchymosis, easy bruisability  Skin:  No rash, tattoos, scars, edema      Vital Signs Last 24 Hrs  T(C): 36.9 (04 Nov 2017 05:17), Max: 36.9 (03 Nov 2017 23:52)  T(F): 98.5 (04 Nov 2017 05:17), Max: 98.5 (04 Nov 2017 05:17)  HR: 97 (04 Nov 2017 05:17) (73 - 97)  BP: 167/83 (04 Nov 2017 05:17) (134/69 - 167/83)  BP(mean): --  RR: 16 (04 Nov 2017 05:17) (13 - 18)  SpO2: 96% (04 Nov 2017 05:17) (94% - 99%)    PHYSICAL EXAM:    Constitutional: NAD, well-developed  HEENT: EOMI, throat clear  Neck: No LAD, supple  Respiratory: CTA and P  Cardiovascular: S1 and S2, RRR, no M  Gastrointestinal: BS+, soft, NT/ND, neg HSM,  Extremities: No peripheral edema, neg clubing, cyanosis  Vascular: 2+ peripheral pulses  Neurological: A/O x 3, no focal deficits  Psychiatric: Normal mood, normal affect  Skin: No rashes      LABS:                        11.7   8.6   )-----------( 354      ( 04 Nov 2017 07:51 )             37.5     11-04    138  |  105  |  13  ----------------------------<  114<H>  4.3   |  21<L>  |  0.90    Ca    8.6      04 Nov 2017 07:51  Phos  4.0     11-04  Mg     1.9     11-04    TPro  7.0  /  Alb  2.6<L>  /  TBili  1.0  /  DBili  .30<H>  /  AST  15  /  ALT  19  /  AlkPhos  83  11-03    PT/INR - ( 04 Nov 2017 07:51 )   PT: 13.3 sec;   INR: 1.22 ratio         PTT - ( 04 Nov 2017 07:51 )  PTT:33.2 sec      RADIOLOGY & ADDITIONAL TESTS:

## 2017-11-04 NOTE — PROGRESS NOTE ADULT - ASSESSMENT
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

## 2017-11-04 NOTE — PROGRESS NOTE ADULT - PROBLEM SELECTOR PLAN 2
s/p ERCP w/ stone extraction, tolerated well  trend CMP  asymptomatic at present  may not get lap rox, as high chance for conversion to open lap as per surgical note

## 2017-11-04 NOTE — PROGRESS NOTE ADULT - ASSESSMENT
81 yo female with Right 2nd digit cellulitis and osteomyelitis      Plan:   Pt seen and examined with attending today   Discussed surgical management with pt 10/31/17 for Right 2nd digit amp with met head resection. Pt agrees.   Surgical management pending at this time due to other medical issue  Poss long term treatment with antibiotic via PICC  Wound stable and no progressing infection noted at this time however it is likely chronic OM with vascular disease  Angio show extensive occlusion at multiple lesions at RLE  No dressing needed    Pod cont f/u while pt in house

## 2017-11-04 NOTE — PROGRESS NOTE ADULT - ASSESSMENT
82F PMHx Afib on Coumadin, HLD, CVA, MVP, OA, HTN, PVD (s/p RLE bypassx3, most recently 2012), Neuropathy, GERD presents with pain and swelling to right second toe a/w RLE cellulitis, r/o osteomyelitis. Found to have symptomatic anemia from acute blood loss, suspect GI source.    - Remains on NSR overnight, however, with few PVC's.  Continue Cardizem 60mg q6 pending LVF.    - No evidence of acute ischemia or meaningful volume overload  - Extensive history of PVD and stenotic valvular disease.- INR 1.30 s/p vitamin K.  - S/P ERCP.  Please resume AC for Afib once cleared by GI.  Will opt for Heparin if considering AKA  - Continue to trend H/H s/p PRBC for anemia secondary to GIB (now stable)  - Last TTE in 2015: showed normal LV function (EF 65%) with mild to moderate MS and mild AI  - Repeat TTE done, pending official report  - Abx per primary team  - Continue amlodipine 5 QD  - Increase losartan 75 qd for better BP control  - Continue statin drug  - Continue metoprolol 100 BID for rate control  - Pain control  - To follow closely with you     Isabel Quintero NP  Cardiology 82F PMHx Afib on Coumadin, HLD, CVA, MVP, OA, HTN, PVD (s/p RLE bypassx3, most recently 2012), Neuropathy, GERD presents with pain and swelling to right second toe a/w RLE cellulitis, r/o osteomyelitis. Found to have symptomatic anemia     - Remains on NSR overnight, however, with few PVC's.  Continue Cardizem 60mg q6 .    - No evidence of acute ischemia or meaningful volume overload  - Extensive history of PVD and stenotic valvular disease.- INR 1.30 s/p vitamin K.  - S/P ERCP.  Please resume AC for Afib once cleared by GI.  Will opt for Heparin if considering AKA.  The patient remains undecided about amputation  - Continue to trend H/H s/p PRBC for anemia secondary to GIB (now stable)  - echo reveals normal lvef on limited study with mild av disease  - Abx per primary team  - Continue amlodipine 5 QD  - Increase losartan 75 qd for better BP control  - Continue statin drug  - Continue metoprolol 100 BID for rate control  - Pain control  - To follow closely with you     Isabel Quintero NP  Cardiology

## 2017-11-04 NOTE — PROGRESS NOTE ADULT - SUBJECTIVE AND OBJECTIVE BOX
The patient was evaluated    82y Female    T(C): 36.9 (11-04-17 @ 13:25), Max: 36.9 (11-03-17 @ 23:52)  HR: 77 (11-04-17 @ 13:25) (73 - 97)  BP: 147/79 (11-04-17 @ 13:25) (134/69 - 167/83)  RR: 16 (11-04-17 @ 13:25) (13 - 18)  SpO2: 96% (11-04-17 @ 13:25) (94% - 99%)  Wt(kg): --    No anesthesia related sequelae.    No additional recommendations.

## 2017-11-04 NOTE — PROGRESS NOTE ADULT - SUBJECTIVE AND OBJECTIVE BOX
All interim records and events noted.    awake and alert, sl pain in toes.      MEDICATIONS  (STANDING):  atorvastatin 40 milliGRAM(s) Oral at bedtime  diltiazem    Tablet 60 milliGRAM(s) Oral every 6 hours  docusate sodium 100 milliGRAM(s) Oral three times a day  gabapentin 100 milliGRAM(s) Oral three times a day  losartan 50 milliGRAM(s) Oral daily  magnesium sulfate  IVPB 1 Gram(s) IV Intermittent once  metoprolol     tartrate 100 milliGRAM(s) Oral two times a day  multivitamin 1 Tablet(s) Oral daily  pantoprazole  Injectable 40 milliGRAM(s) IV Push every 12 hours  piperacillin/tazobactam IVPB. 3.375 Gram(s) IV Intermittent every 8 hours  polyethylene glycol 3350 17 Gram(s) Oral daily  potassium chloride    Tablet ER 20 milliEquivalent(s) Oral two times a day  senna 2 Tablet(s) Oral at bedtime  sodium chloride 0.9%. 500 milliLiter(s) (100 mL/Hr) IV Continuous <Continuous>  vancomycin  IVPB 1250 milliGRAM(s) IV Intermittent every 24 hours    MEDICATIONS  (PRN):  acetaminophen   Tablet. 650 milliGRAM(s) Oral every 6 hours PRN Mild Pain (1 - 3)  lidocaine 5% Ointment 1 Application(s) Topical two times a day PRN neuropathic pain  oxyCODONE    5 mG/acetaminophen 325 mG 2 Tablet(s) Oral every 6 hours PRN Severe Pain (7 - 10)  traMADol 50 milliGRAM(s) Oral every 8 hours PRN Moderate Pain (4 - 6)      Vital Signs Last 24 Hrs  T(C): 36.9 (04 Nov 2017 05:17), Max: 36.9 (03 Nov 2017 23:52)  T(F): 98.5 (04 Nov 2017 05:17), Max: 98.5 (04 Nov 2017 05:17)  HR: 97 (04 Nov 2017 05:17) (73 - 97)  BP: 167/83 (04 Nov 2017 05:17) (134/69 - 167/83)  BP(mean): --  RR: 16 (04 Nov 2017 05:17) (13 - 18)  SpO2: 96% (04 Nov 2017 05:17) (94% - 99%)    PHYSICAL EXAM  General: well developed  well nourished, but frail, in no acute distress  Head: atraumatic, normocephalic  ENT: sclera anicteric, buccal mucosa moist  Neck: supple  CV: S1 S2, regular rate and rhythm  Lungs: clear to auscultation, no wheezes/rhonchi  Abdomen: soft, nontender, bowel sounds present, no palpable hepatosplenomegaly  Extrem: no edema  Skin: no significant increased ecchymosis/petechiae  Neuro: alert and oriented X3,  no focal deficits      LABS:             11.7   8.6   )-----------( 354      ( 11-04 @ 07:51 )             37.5                10.7   9.6   )-----------( 286      ( 11-03 @ 07:11 )             34.4                10.0   10.6  )-----------( 270      ( 11-02 @ 07:11 )             32.0       11-04    138  |  105  |  13  ----------------------------<  114<H>  4.3   |  21<L>  |  0.90    Ca    8.6      04 Nov 2017 07:51  Phos  4.0     11-04  Mg     1.9     11-04    TPro  7.0  /  Alb  2.6<L>  /  TBili  1.0  /  DBili  .30<H>  /  AST  15  /  ALT  19  /  AlkPhos  83  11-03 11-04 @ 07:51  PT13.3 INR1.22  PTT33.2  11-03 @ 07:11  PT14.0 INR1.28  PTT--  11-02 @ 07:11  PT14.2 INR1.30  PTT--  11-01 @ 06:11  PT20.7 INR1.87  PTT38.4  10-31 @ 06:06  PT27.0 INR2.43  PTT--      RADIOLOGY & ADDITIONAL STUDIES:    IMPRESSION/RECOMMENDATIONS:

## 2017-11-04 NOTE — PROGRESS NOTE ADULT - PROBLEM SELECTOR PLAN 1
CT abdomen showin mm common hepatic duct calculus with mild biliary dilatation.   s/p ercp, and stone extraction  f/u surgery/gi  tolerating diet

## 2017-11-04 NOTE — PROGRESS NOTE ADULT - PROBLEM SELECTOR PLAN 5
appreciate Podiatry f/u  may require toe amp vs. long term Abx therapy   mangament as per podiatry/vascular surgery

## 2017-11-04 NOTE — CONSULT NOTE ADULT - SUBJECTIVE AND OBJECTIVE BOX
History and Physical:   Source of Information	Chart(s), Patient  Outpatient Providers	PCP: Dr. Lockhart; Cardio: Dr. Laughlin, [facility] Surgeon: Dr. Parham       History of Present Illness:  Chief Complaint/Reason for Admission: pain and swelling to right second toe  History of Present Illness:   82F PMHx Afib on Coumadin, HLD, CVA, MVP, OA, HTN, PVD (s/p RLE bypassx3, most recently 2012), Neuropathy, GERD presents with pain and swelling to right second toe that started 1 month ago. Patient states foot became red, painful, and a small wound with a scab started forming on the top portion of her second toe. It became extremely difficult to bear weight on the foot so she went to see Dr. Isaacs at the wound center and he drained the wound at which point, yellow pus drained from the wound. He prescribed her an antibiotic (she does not recall which one). She denies any fevers, chills, chesst pain or shortness of breat. Admits to joint pain due to her RA.     In ED, pt's vital was wnl. WBC 8.2, Hct: 26.1, lactate 0.8, , CMP revealed elevated bilirubin of 1.5, remainder is wnl , INR: 2.26. CXR showed no acute pathology. X ray of Rt foot showed possible small periarticular erosion medial aspect head of the proximal phalanx which might represent infection/ osteomyelitis. Pt received 1 dose  of  vanco and 1 dose of zosyn and 1 bolus of NS, BCX, UCX was sent in  ED.       Review of Systems:  Review of Systems: CONSTITUTIONAL: No weakness, fevers or chills  	RESPIRATORY: No cough, wheezing, hemoptysis; No shortness of breath  	CARDIOVASCULAR: No chest pain or palpitations  	GASTROINTESTINAL: No abdominal or epigastric pain. No nausea, vomiting, or hematemesis; No diarrhea or constipation.   	GENITOURINARY: No dysuria, frequency or hematuria  	NEUROLOGICAL: No numbness or weakness  	MSK: +joint pain in UE  	SKIN: wound on right second toe   Extremities: Pain, warmth, redness, Edema of RLQ      Allergies and Intolerances:        Allergies:  	No Known Allergies:     Home Medications:   * Patient Currently Takes Medications as of 16-Jun-2017 09:48 documented in Structured Notes  · 	traMADol 50 mg oral tablet: 1 tab(s) orally 3 times a day  · 	warfarin 3 mg oral tablet: 1 tab(s) orally once a day  · 	Rolling Walker: Diagnosis: Weakness:   · 	amLODIPine 5 mg oral tablet: 1 tab(s) orally once a day (at bedtime)  · 	omeprazole 40 mg oral delayed release capsule: 1 cap(s) orally once a day (at bedtime)  · 	atorvastatin 40 mg oral tablet: 1 tab(s) orally once a day (at bedtime)  · 	losartan 50 mg oral tablet: 1 tab(s) orally once a day (at bedtime)  · 	multivitamin:   once a day  · 	aspirin 81 mg oral delayed release tablet: 1 tab(s) orally once a day  · 	gabapentin 100 mg oral capsule:  orally 3 times a day  · 	metoprolol tartrate 100 mg oral tablet: 1 tab(s) orally 2 times a day    Patient History:    Past Medical History:  Afib    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     Past Surgical History:  Elective surgery  ("Twisted bowel", 2014)  Elective surgery  (Exision of cyst on liver, 1985)  H/O cerebral aneurysm repair  Brain clips (1978(  PVD (peripheral vascular disease)  s/p RLE bypass x 3, most recent 3/2012 Right external iliac to PT bypass w/ PTFE (2012)  Renal stone  Cysto stent placement 10/1/2014  S/P cataract surgery  (Left eye)  S/P ORIF (open reduction internal fixation) fracture  Left hip fx (2012) & R hip fx (2013)  S/P FRED (total abdominal hysterectomy)  (1987, Hx of "ovarian cancer?").     Family History:  Mother  Still living? Unknown  Family history of aneurysm, Age at diagnosis: Age Unknown.     Social History:  Social History (marital status, living situation, occupation, tobacco use, alcohol and drug use, and sexual history): Physical condition: no walker, no O2 needed  	no smoking, social drinker, no recreational drug using  Immunization: flu , Pneumonia       Pt had stones in CBD on sonogram  She does not know why nothing was done about this then.  Yesterday she had ERCP with stone removal and placement of stent for stones in CBD that were not removed.  Stones removed yesterday were an incidental finding unrelated to her reason for admission to hospital this time.  She has never had biliary symptoms and today has no abdominal pain or tenderness.    On exam she has a midline scar extending a little way above the umbilicus from my surgery about 3 years ago for SBO.  She also has a right subcostal scar but does not know what operation this was    I will speak to her daughter regarding why nothing was done for CBD stones in 2014 and what operation was done through the right subcostal scar.  She has a higher chance of conversion to open cholecystectomy if lap rox is attempted, because of the right subcostal surgery.  Perhaps she should not have lap rox unless she becomes symptomatic.  Complete removal of CBD stones when stent is removed may be sufficient for her.  Will discuss with GI and medicine

## 2017-11-05 DIAGNOSIS — R19.7 DIARRHEA, UNSPECIFIED: ICD-10-CM

## 2017-11-05 PROCEDURE — 99233 SBSQ HOSP IP/OBS HIGH 50: CPT

## 2017-11-05 RX ORDER — SOD SULF/SODIUM/NAHCO3/KCL/PEG
1000 SOLUTION, RECONSTITUTED, ORAL ORAL EVERY 4 HOURS
Qty: 0 | Refills: 0 | Status: DISCONTINUED | OUTPATIENT
Start: 2017-11-05 | End: 2017-11-05

## 2017-11-05 RX ADMIN — Medication 1 TABLET(S): at 11:52

## 2017-11-05 RX ADMIN — Medication 650 MILLIGRAM(S): at 21:03

## 2017-11-05 RX ADMIN — GABAPENTIN 100 MILLIGRAM(S): 400 CAPSULE ORAL at 22:19

## 2017-11-05 RX ADMIN — PIPERACILLIN AND TAZOBACTAM 25 GRAM(S): 4; .5 INJECTION, POWDER, LYOPHILIZED, FOR SOLUTION INTRAVENOUS at 13:54

## 2017-11-05 RX ADMIN — PIPERACILLIN AND TAZOBACTAM 25 GRAM(S): 4; .5 INJECTION, POWDER, LYOPHILIZED, FOR SOLUTION INTRAVENOUS at 05:46

## 2017-11-05 RX ADMIN — GABAPENTIN 100 MILLIGRAM(S): 400 CAPSULE ORAL at 05:43

## 2017-11-05 RX ADMIN — Medication 100 MILLIGRAM(S): at 17:08

## 2017-11-05 RX ADMIN — Medication 166.67 MILLIGRAM(S): at 17:11

## 2017-11-05 RX ADMIN — Medication 100 MILLIGRAM(S): at 05:42

## 2017-11-05 RX ADMIN — SODIUM CHLORIDE 100 MILLILITER(S): 9 INJECTION INTRAMUSCULAR; INTRAVENOUS; SUBCUTANEOUS at 01:12

## 2017-11-05 RX ADMIN — GABAPENTIN 100 MILLIGRAM(S): 400 CAPSULE ORAL at 13:55

## 2017-11-05 RX ADMIN — PANTOPRAZOLE SODIUM 40 MILLIGRAM(S): 20 TABLET, DELAYED RELEASE ORAL at 17:09

## 2017-11-05 RX ADMIN — PIPERACILLIN AND TAZOBACTAM 25 GRAM(S): 4; .5 INJECTION, POWDER, LYOPHILIZED, FOR SOLUTION INTRAVENOUS at 22:19

## 2017-11-05 RX ADMIN — ATORVASTATIN CALCIUM 40 MILLIGRAM(S): 80 TABLET, FILM COATED ORAL at 22:19

## 2017-11-05 RX ADMIN — SODIUM CHLORIDE 100 MILLILITER(S): 9 INJECTION INTRAMUSCULAR; INTRAVENOUS; SUBCUTANEOUS at 20:32

## 2017-11-05 RX ADMIN — Medication 100 MILLIGRAM(S): at 05:41

## 2017-11-05 RX ADMIN — PANTOPRAZOLE SODIUM 40 MILLIGRAM(S): 20 TABLET, DELAYED RELEASE ORAL at 05:46

## 2017-11-05 RX ADMIN — Medication 650 MILLIGRAM(S): at 20:33

## 2017-11-05 RX ADMIN — LOSARTAN POTASSIUM 100 MILLIGRAM(S): 100 TABLET, FILM COATED ORAL at 05:45

## 2017-11-05 NOTE — PROGRESS NOTE ADULT - ASSESSMENT
82F PMHx Afib on Coumadin, HLD, CVA, MVP, OA, HTN, PVD (s/p RLE bypassx3, most recently 2012), Neuropathy, GERD presents with pain and swelling to right second toe a/w RLE cellulitis, r/o osteomyelitis. Found to have symptomatic anemia     - SR with 1st degree AVB on tele with few PVC's 70's - 80's.  Continue Cardizem 60mg q6 if HR tolerates may consider increasing for better blood pressure control.    - No evidence of acute ischemia or meaningful volume overload  - Extensive history of PVD and stenotic valvular disease.- INR 1.30 s/p vitamin K now 1.22 awaiting today's labs.  Pt continues off A/C.   - S/P ERCP.  Please resume AC for Afib once cleared by GI.  Will opt for Heparin if considering AKA.  The patient remains undecided about amputation  - Continue to trend H/H s/p PRBC for anemia secondary to GIB (now stable)  - echo reveals normal lvef on limited study with mild av disease  - Abx per primary team  - SBP trending 140's to 170's losartan increased to 100mg qd for better BP control will continue to monitor closely.   - Continue statin drug  - Continue metoprolol 100 BID for rate control  - Pain control  - To follow closely with you    Magalie Suresh NP-C  Cardiology 82F PMHx Afib on Coumadin, HLD, CVA, MVP, OA, HTN, PVD (s/p RLE bypassx3, most recently 2012), Neuropathy, GERD presents with pain and swelling to right second toe a/w RLE cellulitis, r/o osteomyelitis. Found to have symptomatic anemia     - SR with 1st degree AVB on tele with few PVC's 70's - 80's.  Continue Cardizem 60mg q6 if HR tolerates may consider increasing for better blood pressure control.    - No evidence of acute ischemia or meaningful volume overload  - Extensive history of PVD and stenotic valvular disease.- INR 1.30 s/p vitamin K now 1.22 awaiting today's labs.  Pt continues off A/C SR on tele.  For EGD/Colonoscopy when optimized by GI with bowel prep today.  Would monitor electrolytes closely.  - S/P ERCP.  Please resume AC for Afib once cleared by GI.  Will opt for Heparin if considering AKA.  The patient remains undecided about amputation  - Continue to trend H/H s/p PRBC for anemia secondary to GIB (now stable)  - echo reveals normal lvef on limited study with mild av disease  - Abx per primary team  - SBP trending 140's to 170's losartan increased to 100mg qd for better BP control will continue to monitor closely.   - Continue statin drug  - Continue metoprolol 100 BID for rate control  - Pain control  - To follow closely with you    Magalie Suersh, NP-C  Cardiology 82F PMHx Afib on Coumadin, HLD, CVA, MVP, OA, HTN, PVD (s/p RLE bypassx3, most recently 2012), Neuropathy, GERD presents with pain and swelling to right second toe a/w RLE cellulitis, r/o osteomyelitis. Found to have symptomatic anemia     - SR with 1st degree AVB on tele with few PVC's 70's - 80's.    - Continue Cardizem 60mg q6   - Continue metoprolol 100 BID for rate control  - No evidence of acute ischemia or meaningful volume overload  - Extensive history of PVD and stenotic valvular disease.- INR 1.30 s/p vitamin K now 1.22 awaiting today's labs.  Pt continues off A/C SR on tele.  For EGD/Colonoscopy when optimized by GI with bowel prep today.  Would monitor electrolytes closely.  - S/P ERCP.    - will resume AC for Afib once cleared by GI.   - The patient remains undecided about amputation  - Continue to trend H/H s/p PRBC for anemia secondary to GIB (now stable)  - planned for egd tomorrow, is optimized from a cv perspective for this procedure  - echo reveals normal lvef on limited study with mild av disease  - will follow    Magalie Suresh NP-C  Cardiology

## 2017-11-05 NOTE — PROGRESS NOTE ADULT - PROBLEM SELECTOR PLAN 4
(+) iron deficiency + history of AC and personal history of PUD  continue protonix 40 mg BID  monitor h/h, transfuse as needed  no colonoscopy for now as she is having active diarrhea

## 2017-11-05 NOTE — PROGRESS NOTE ADULT - SUBJECTIVE AND OBJECTIVE BOX
Patient is a 82y old  Female who presents with a chief complaint of weakness and pain both legs (28 Oct 2017 20:23)        HPI:  82F PMHx Afib on Coumadin, HLD, CVA, MVP, OA, HTN, PVD (s/p RLE bypassx3, most recently 2012), Neuropathy, GERD presents with pain and swelling to right second toe that started 1 month ago. Patient states foot became red, painful, and a small wound with a scab started forming on the top portion of her second toe. It became extremely difficult to bear weight on the foot so she went to see Dr. Isaacs at the wound center and he drained the wound at which point, yellow pus drained from the wound. He prescribed her an antibiotic (she does not recall which one). She denies any fevers, chills, chesst pain or shortness of breat. Admits to joint pain due to her RA.     In ED, pt's vital was wnl. WBC 8.2, Hct: 26.1, lactate 0.8, , CMP revealed elevated bilirubin of 1.5, remainder is wnl , INR: 2.26. CXR showed no acute pathology. X ray of Rt foot showed possible small periarticular erosion medial aspect head of the proximal phalanx which might represent infection/ osteomyelitis. Pt received 1 dose  of  vanco and 1 dose of zosyn and 1 bolus of NS, BCX, UCX was sent in  ED. (28 Oct 2017 13:42)      SUBJECTIVE & OBJECTIVE: Pt seen and examined at bedside.     PHYSICAL EXAM:  T(C): 36.7 (11-05-17 @ 13:28), Max: 36.8 (11-04-17 @ 20:04)  HR: 77 (11-05-17 @ 13:28) (74 - 77)  BP: 133/77 (11-05-17 @ 13:28) (133/77 - 170/62)  RR: 17 (11-05-17 @ 13:28) (16 - 17)  SpO2: 95% (11-05-17 @ 13:28) (93% - 95%)  Wt(kg): --   GENERAL: NAD, well-groomed, well-developed  HEAD:  Atraumatic, Normocephalic  EYES: EOMI, PERRLA, conjunctiva and sclera clear  ENMT: Moist mucous membranes  NECK: Supple, No JVD  NERVOUS SYSTEM:  Alert & Oriented X3, Motor Strength 5/5 B/L upper and lower extremities; DTRs 2+ intact and symmetric  CHEST/LUNG: Clear to auscultation bilaterally; No rales, rhonchi, wheezing, or rubs  HEART: Regular rate and rhythm; No murmurs, rubs, or gallops  ABDOMEN: Soft, Nontender, Nondistended; Bowel sounds present  EXTREMITIES:  2+ Peripheral Pulses, No clubbing, cyanosis, or edema        MEDICATIONS  (STANDING):  atorvastatin 40 milliGRAM(s) Oral at bedtime  diltiazem    Tablet 60 milliGRAM(s) Oral every 6 hours  gabapentin 100 milliGRAM(s) Oral three times a day  losartan 100 milliGRAM(s) Oral daily  metoprolol     tartrate 100 milliGRAM(s) Oral two times a day  multivitamin 1 Tablet(s) Oral daily  pantoprazole  Injectable 40 milliGRAM(s) IV Push every 12 hours  piperacillin/tazobactam IVPB. 3.375 Gram(s) IV Intermittent every 8 hours  sodium chloride 0.9%. 500 milliLiter(s) (100 mL/Hr) IV Continuous <Continuous>  vancomycin  IVPB 1250 milliGRAM(s) IV Intermittent every 24 hours    MEDICATIONS  (PRN):  acetaminophen   Tablet. 650 milliGRAM(s) Oral every 6 hours PRN Mild Pain (1 - 3)  lidocaine 5% Ointment 1 Application(s) Topical two times a day PRN neuropathic pain  oxyCODONE    5 mG/acetaminophen 325 mG 2 Tablet(s) Oral every 6 hours PRN Severe Pain (7 - 10)  traMADol 50 milliGRAM(s) Oral every 8 hours PRN Moderate Pain (4 - 6)      LABS:                        11.7   8.6   )-----------( 354      ( 04 Nov 2017 07:51 )             37.5     11-04    138  |  105  |  13  ----------------------------<  114<H>  4.3   |  21<L>  |  0.90    Ca    8.6      04 Nov 2017 07:51  Phos  4.0     11-04  Mg     1.9     11-04      PT/INR - ( 04 Nov 2017 07:51 )   PT: 13.3 sec;   INR: 1.22 ratio         PTT - ( 04 Nov 2017 07:51 )  PTT:33.2 sec      CAPILLARY BLOOD GLUCOSE          CAPILLARY BLOOD GLUCOSE        CAPILLARY BLOOD GLUCOSE                RECENT CULTURES:      RADIOLOGY & ADDITIONAL TESTS:                        DVT/GI ppx  Discussed with pt @ bedside

## 2017-11-05 NOTE — PROGRESS NOTE ADULT - SUBJECTIVE AND OBJECTIVE BOX
HPI:  82F PMHx Afib on Coumadin, HLD, CVA, MVP, OA, HTN, PVD (s/p RLE bypassx3, most recently 2012), Neuropathy, GERD presents with pain and swelling to right second toe that started 1 month ago. Patient states foot became red, painful, and a small wound with a scab started forming on the top portion of her second toe. It became extremely difficult to bear weight on the foot so she went to see Dr. Isaacs at the wound center and he drained the wound at which point, yellow pus drained from the wound. He prescribed her an antibiotic (she does not recall which one). She denies any fevers, chills, chesst pain or shortness of breat. Admits to joint pain due to her RA.     In ED, pt's vital was wnl. WBC 8.2, Hct: 26.1, lactate 0.8, , CMP revealed elevated bilirubin of 1.5, remainder is wnl , INR: 2.26. CXR showed no acute pathology. X ray of Rt foot showed possible small periarticular erosion medial aspect head of the proximal phalanx which might represent infection/ osteomyelitis. Pt received 1 dose  of  vanco and 1 dose of zosyn and 1 bolus of NS, BCX, UCX was sent in  ED. (28 Oct 2017 13:42)      SUBJECTIVE:  Patient is a 82y old  Female who presents with a chief complaint of weakness and pain both legs (28 Oct 2017 20:23)          OBJECTIVE:  Review Of Systems:  Constitutional: [ ] Fever [ ] Chills [ ] Fatigue [ ] Weight change   HEENT: [ ] Blurred vision [ ] Eye Pain [ ] Headache [ ] Runny nose [ ] Sore Throat   Respiratory: [ ] Cough [ ] Wheezing [ ] Shortness of breath  Cardiovascular: [ ] Chest Pain [ ] Palpitations [ ] CRABTREE [ ] PND [ ] Orthopnea  Gastrointestinal: [ ] Abdominal Pain [ ] Diarrhea [ ] Constipation [ ] Hemorrhoids [ ] Nausea [ ] Vomiting  Genitourinary: [ ] Nocturia [ ] Dysuria [ ] Incontinence  Extremities: [ ] Swelling [ ] Joint Pain  Neurologic: [ ] Focal deficit [ ] Paresthesias [ ] Syncope  Lymphatic: [ ] Swelling [ ] Lymphadenopathy   Skin: [ ] Rash [ ] Ecchymoses [ ] Wounds [ ] Lesions  Psychiatry: [ ] Depression [ ] Suicidal/Homicidal Ideation [ ] Anxiety [ ] Sleep Disturbances  [ ] 10 point review of systems is otherwise negative except as mentioned above            [ ]Unable to obtain    Allergy:  Allergies    No Known Allergies    Intolerances        Medications:  MEDICATIONS  (STANDING):  atorvastatin 40 milliGRAM(s) Oral at bedtime  diltiazem    Tablet 60 milliGRAM(s) Oral every 6 hours  docusate sodium 100 milliGRAM(s) Oral three times a day  gabapentin 100 milliGRAM(s) Oral three times a day  losartan 100 milliGRAM(s) Oral daily  metoprolol     tartrate 100 milliGRAM(s) Oral two times a day  multivitamin 1 Tablet(s) Oral daily  pantoprazole  Injectable 40 milliGRAM(s) IV Push every 12 hours  piperacillin/tazobactam IVPB. 3.375 Gram(s) IV Intermittent every 8 hours  polyethylene glycol 3350 17 Gram(s) Oral daily  senna 2 Tablet(s) Oral at bedtime  sodium chloride 0.9%. 500 milliLiter(s) (100 mL/Hr) IV Continuous <Continuous>  vancomycin  IVPB 1250 milliGRAM(s) IV Intermittent every 24 hours    MEDICATIONS  (PRN):  acetaminophen   Tablet. 650 milliGRAM(s) Oral every 6 hours PRN Mild Pain (1 - 3)  lidocaine 5% Ointment 1 Application(s) Topical two times a day PRN neuropathic pain  oxyCODONE    5 mG/acetaminophen 325 mG 2 Tablet(s) Oral every 6 hours PRN Severe Pain (7 - 10)  traMADol 50 milliGRAM(s) Oral every 8 hours PRN Moderate Pain (4 - 6)      PMH/PSH/FH/SH: [ ] Unchanged    Vitals:  T(C): 36.5 (11-05-17 @ 05:31), Max: 36.9 (11-04-17 @ 13:25)  HR: 75 (11-05-17 @ 05:31) (74 - 77)  BP: 170/62 (11-05-17 @ 05:31) (147/79 - 170/62)  BP(mean): --  RR: 16 (11-05-17 @ 05:31) (16 - 16)  SpO2: 93% (11-05-17 @ 05:31) (93% - 96%)  Wt(kg): --  Daily     Daily   I&O's Summary    04 Nov 2017 08:01  -  05 Nov 2017 07:00  --------------------------------------------------------  IN: 2650 mL / OUT: 0 mL / NET: 2650 mL        Labs:                        11.7   8.6   )-----------( 354      ( 04 Nov 2017 07:51 )             37.5     11-04    138  |  105  |  13  ----------------------------<  114<H>  4.3   |  21<L>  |  0.90    Ca    8.6      04 Nov 2017 07:51  Phos  4.0     11-04  Mg     1.9     11-04      PT/INR - ( 04 Nov 2017 07:51 )   PT: 13.3 sec;   INR: 1.22 ratio         PTT - ( 04 Nov 2017 07:51 )  PTT:33.2 sec                  ECG:  < from: 12 Lead ECG (10.28.17 @ 13:32) >  Ventricular Rate 81 BPM    Atrial Rate 81 BPM    P-R Interval 172 ms    QRS Duration 96 ms    Q-T Interval 416 ms    QTC Calculation(Bezet) 483 ms    P Axis 16 degrees    R Axis -34 degrees    T Axis 8 degrees    Diagnosis Line Normal sinus rhythm  Left axis deviation  Abnormal ECG  When compared with ECG of 14-JUN-2017 11:52,  No significant change was found  Confirmed by Campbell Horner MD ([id number]) on 10/29/2017 1:49:49 PM    < end of copied text >    Echo:  < from: TTE Echo Doppler w/o Cont (11.04.17 @ 08:47) >   EXAM:  ECHO TTE W/O CON COMP W/DOPPLR         PROCEDURE DATE:  11/04/2017        INTERPRETATION:  INDICATION: aortic valve disease    Blood Pressure 167/83    Height 152     Weight 51       BSA 1.5    Dimensions:    LA 3.2       Normal Values: 2.0- 4.0 cm    Ao 3.0        Normal Values: 2.0 - 3.8 cm  SEPTUM 1.0       Normal Values: 0.6 - 1.2 cm  PWT 1.0       Normal Values: 0.6 - 1.1 cm  LVIDd 3.5         Normal Values: 3.0 - 5.6 cm  LVIDs 2.6         Normal Values: 1.8 - 4.0 cm    Derived Variables:  LVMI     g/m2  RWT      Fractional Short      Ejection Fraction 65    Doppler Peak v. AoV=   (m/sec)    OBSERVATIONS:    Mitral Valve: normal, 1+ MR.  Aortic Valve/Aorta: Calcified trileaflet aortic valve. Minimal AI  Tricuspid Valve: normalwith trace TR.  Pulmonic Valve: normal  Left Atrium: normal  Right Atrium: normal  Left Ventricle: Technically limited study. As visualized, there is normal   LV size and systolic function, estimated LVEF of 65%.  Right Ventricle: Not well visualized, probably normal  Pericardium/Pleura: no significant pleural effusion, no significant   pericardial effusion.  Pulmonary/RV Pressure: Inadequate TR jet to estimate PA systolic pressure.  LV Diastolic Function: Grade 1 diastolic dysfunction.     Technically limited study. As visualized, there is normal left   ventricular size and systolic function, estimated LVEF of 65%. The right   ventricle is not well seen, but is probably normal in size with normal   systolic function. Grade 1 diastolic dysfunction. Normal biatrial size.   The aortic root is normal in size. The mitral valve is structurally   normal, 1+ MR. The aortic valve is calcified without stenosis. There is   minimal aortic insufficiency. Trace physiologic TR. Inadequate TR jet to   estimate PA systolic pressure. No significant pericardial effusion.                  FREDI DALAL M.D., ATTENDING CARDIOLOGIST  This document has been electronically signed. Nov 4 2017  5:20PM    < end of copied text >    Stress Testing:     Cath:    Imaging:  < from: IR Aortogram Abd/Eitan Iliofemoral (11.02.17 @ 10:49) >  EXAM:  IR PROCEDURE NON PICC                          EXAM:  US GUIDANCE VASCULAR ACCESS                          EXAM:  SP ANGIO EXTREMITY RT SI                          EXAM:  SP FLUOROSCOPY TO 1 HOUR                          EXAM:  SP AORTOGRM ABD W RUNOFF BI SI                            PROCEDURE DATE:  11/02/2017          INTERPRETATION:  Abdominal aortogram and right femoral angiogram via left   groin    HISTORY: Ischemic right foot with previous right leg bypass procedures   which were found to be occluded and the patient's recent CTA examination.   Catheter angiogram was requested to better visualize small distal vessels.    PROCEDURE: The patient was brought to the interventional suite and placed   in a supine position. Physiologic monitoring and oxygen saturation were   assessed throughout the procedure.  A time out was performed to verify   that the correct patient was present for the appropriate procedure.    The patient's left groin region was prepped and draped in the usual   sterile fashion. Ultrasound evaluation of the left common femoral artery   showed the artery to the patient. A stored sonographic image was sent to   the hospital's PACS system for permanent archiving.    After injection of local anesthesia (Lidocaine 1%), single wall puncture   of the left common femoral artery was performed. Guidewire and catheter   exchanges were made to advance a 4 Malagasy straight catheter which was   advanced over a guidewire to the level of the abdominal aorta and aorto  iliac angiography was performed. Exchange was then made for a 4 Malagasy   sheath followed by a 4 Malagasy Sos selective catheter which was formed in   the upper abdominal aorta and retracted over guidewire to selectively   catheterize the right commoniliac artery and right leg angiography was   performed at multiple levels along the right leg.    The catheter and sheath were removed and digital pressure was applied.   The patient tolerated the procedure well and was discharged from the   interventional suite in stable condition.    INTERPRETATION: Aortic injection showed ectasia of the upper portion of   the infrarenal abdominal aorta with mild ectasia of the common iliac   arteries and occlusion of the right common femoral artery at the level of   the inguinal ligament. Collateral vessels reconstitute an isolated   segment of the mid right superficial femoral artery. There is also   reconstitution of an isolated segment of the above-knee popliteal artery.   Below the right knee, collateral vessels reconstitute the tibioperoneal   trunk via retrograde flow through the anterior tibial artery. The   posterior tibial artery is occluded. At the midpoint of the right leg,   the anterior tibial artery reconstitutes via collaterals and is not seen   beyond the ankle joint.  The peroneal artery is the remaining single   vessel runoff vessel of the right leg. . Above the ankle, it sends a   large collateral to reconstitute the posterior tibial artery above the   ankle. The dorsalis pedis artery was not seen.    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    Thank you for the courtesy of this referral.                TREY ROOT M.D., ATTENDING RADIOLOGIST  This document has been electronically signed. Nov 2 2017  2:05PM        < end of copied text >  < from: US Duplex Arterial Lower Ext Ltd, Right (10.31.17 @ 11:39) >  EXAM:  US DPLX LWR EXT ARTS LTD RT                            PROCEDURE DATE:  10/31/2017          INTERPRETATION:  Arterial Duplex Ultrasound of the right lower extremity    Clinical information: Ischemic right foot with history of prior right leg  arterial bypass grafts    Comparison: CTA of 10/30/2017    Sonographic evaluation of the right lower extremity arteries to the level   of the right foot was performed using gray-scale and color Doppler   imaging.    Interpretation:     Right leg: The bypass grafts of the right leg are occluded. There is   pulsatile flow in the right popliteal artery but no flow is identified in   the named arteries or grafts proximal to this level. Distal flow is   limited as well.    Incidentally noted is a complex hypo to anechoic collection which may   represent residual hematoma in the proper clinical setting.        IMPRESSION:  Graft occlusions. Flow in the right popliteal artery. No   flow identified below the knee in named vessels.    Thank you for this referral.                TREY ROOT M.D., ATTENDING RADIOLOGIST  This document has been electronically signed. Oct 31 2017 12:37PM              < end of copied text >  < from: CT Angio Lower Extremity w/ IV Cont, Bilateral (10.30.17 @ 18:25) >  EXAM:  CT ANGIO LWR EXT (W)AW IC BI                          EXAM:  CT ANGIO ABD PELV (W)AW IC                            PROCEDURE DATE:  10/30/2017          INTERPRETATION:  CT ANGIO ABD PELV (W)AW IC, CT ANGIO LWR EXT (W)AW IC BI    HISTORY:  Atherosclerosis of the native arteries of the lower extremities   right>left legs, right second toe ulcer, gangrene, ischemic right foot    TECHNIQUE: CT angiography of the abdominal aorta and arterial runoff of   the lower extremities is performed. Thestudy is performed utilizing a   rapid bolus of 95 cc Omnipaque 350 IV contrast. MIP images for CT   angiographic display are created from source data on an independent 3-D   workstation and archived into PACS. This study was performed using   automatic exposure control (radiation dose reduction software) to obtain   a diagnostic image quality scan with patient dose as low as reasonably   achievable.    COMPARISON: Lower extremity arterial Doppler 6/16/2017, 6/14/2017, CT   abdomen and pelvis 8/31/2015    FINDINGS:     ABDOMINAL AORTA: Atherosclerotic plaque without occlusion. 3.7 x 2.7 cm   fusiform infrarenal abdominal aortic aneurysm.     Celiac: Normal. 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  SMA: Normal.  DEVAN: Normal.  Renal Arteries: Single bilateral. Normal caliber patent.    RIGHT LEG:    Common Iliac artery: Normal.  External Iliac: Severe calcified plaque without occlusion.  Internal Iliac: Normal.    Common femoral: Occluded.  SFA: Occluded.  Profunda: Patent.    3 distal bypass grafts from the right external iliac and common femoral   are completely occluded.    Popliteal: Occluded.  Anterior Tibial (AT): Occluded proximally, reconstituted distally but   severely attenuated.  Tibioperoneal trunk (TPT): Occluded.  Posterior Tibial (PT): Severely attenuated with multifocal short segment   occlusions. Distal segment supplied by the peroneal.  Peroneal: Diffusely attenuated but patent.  Dorsalis Pedis (DP): Severely attenuated but patent.  Plantar: Patent.    LEFT LEG:    Common Iliac artery: Normal.  External Iliac: Normal.  Internal Iliac: Normal.    Common femoral: Normal.  SFA: Moderate to severe multifocal disease, but patent.  Profunda: Normal.    Popliteal: Severe multifocal disease, but patent.  AT: Severely attenuated but patent.  TPT: Diffusely calcified but patent.  PT: Severely attenuated but patent.  Peroneal: Severely attenuated but patent.  DP: Patent.  Plantar: Severely attenuated but patent.      ADDITIONAL FINDINGS: Small right pleural effusion and bibasilar lung   atelectasis. The visualized portions of the liver, spleen, pancreas are   normal. Multiple gallstones. 14 mm common bile duct calculus is noted.   The kidneys and remainder of the retroperitoneum are normal aside from a   nonobstructing right lower pole calculus and bilateral extrarenal pelvis.   No pelvic abnormalities are seen. No bowel related abnormality. Status   post bilateral longstem femoral intramedullary rods. No deep lower   extremity soft tissue ulcer or asymmetric soft tissue edema.      3D image post processing was helpful in evaluating the vascular anatomy,   supporting the findings stated above.        IMPRESSION:     AORTOILIAC INFLOW: 3.7 cm fusiform infrarenal abdominal aortic aneurysm.   No evidence of aortoiliac occlusive disease.    RIGHT LEG: Complete occlusion of the native femoral-popliteal arteries. 3   right external iliac/common femoral-distal bypass grafts are completely   occluded. Three-vessel runoff to the foot is severely attenuated but   patent. Multifocal short segment posterior tibial artery occlusions.   Distal segment supplied by peroneal collaterals. DP is severely   attenuated but patent. Plantar is patent.    LEFT LEG: Patent femoral-popliteal arteries with moderate to severe   multifocal disease. Three-vessel runoff to the foot is severely   attenuated but patent. DP and plantar are patent.    OTHER: 14 mm upper common bile duct calculus without significant biliary   dilatation. Correlation with LFTs is recommended.                GRAZYNA GAGNON M.D., ATTENDING RADIOLOGIST  This document has been electronically signed. Oct 31 2017  9:28AM                < end of copied text >    Interpretation of Telemetry:  SR 1 degree AVB on tele 70's to 80's    Physical Exam:  Appearance: [x ] Normal  [ ] abnormal [x ] NAD   Eyes: [ ] PERRL [ ] EOMI  HENT: [x ] Normal [ ] Abnormal oral muscosa [x ]NC/AT  Cardiovascular: [x ] S1 [x ] S2 [x ] RRR [ ] m/r/g [ ]edema [ ] JVP  Procedural Access Site: [ ]  hematoma [ ] tender to palpation [ ] 2+ pulse [ ] bruit [ ] Ecchymosis  Respiratory: [x ] Clear to auscultation bilaterally  Gastrointestinal: [x ] Soft [ ] tenderness[ ] distension [x ] BS  Musculoskeletal: [ ] clubbing [x ] right 2nd toe with necrosis and right heal with scabbed area with associated pain no redness or draining   Neurologic: [x ] Non-focal  Lymphatic: [ ] lymphadenopathy  Psychiatry: [x ] AAOx3  [ ] confused [ ] disoriented [x ] Mood & affect appropriate  Skin: [ ]  rashes [ ] ecchymoses [ ] cyanosis

## 2017-11-05 NOTE — PROGRESS NOTE ADULT - SUBJECTIVE AND OBJECTIVE BOX
INTERVAL HPI/OVERNIGHT EVENTS: Pt seen and examined at bedside, having active diarrhea, 3 watery BM's today    MEDICATIONS  (STANDING):  atorvastatin 40 milliGRAM(s) Oral at bedtime  bisacodyl Suppository 10 milliGRAM(s) Rectal every 4 hours  diltiazem    Tablet 60 milliGRAM(s) Oral every 6 hours  docusate sodium 100 milliGRAM(s) Oral three times a day  gabapentin 100 milliGRAM(s) Oral three times a day  losartan 100 milliGRAM(s) Oral daily  metoprolol     tartrate 100 milliGRAM(s) Oral two times a day  multivitamin 1 Tablet(s) Oral daily  pantoprazole  Injectable 40 milliGRAM(s) IV Push every 12 hours  piperacillin/tazobactam IVPB. 3.375 Gram(s) IV Intermittent every 8 hours  polyethylene glycol 3350 17 Gram(s) Oral daily  polyethylene glycol/electrolyte Solution 1000 milliLiter(s) Oral every 4 hours  senna 2 Tablet(s) Oral at bedtime  sodium chloride 0.9%. 500 milliLiter(s) (100 mL/Hr) IV Continuous <Continuous>  vancomycin  IVPB 1250 milliGRAM(s) IV Intermittent every 24 hours    MEDICATIONS  (PRN):  acetaminophen   Tablet. 650 milliGRAM(s) Oral every 6 hours PRN Mild Pain (1 - 3)  lidocaine 5% Ointment 1 Application(s) Topical two times a day PRN neuropathic pain  oxyCODONE    5 mG/acetaminophen 325 mG 2 Tablet(s) Oral every 6 hours PRN Severe Pain (7 - 10)  traMADol 50 milliGRAM(s) Oral every 8 hours PRN Moderate Pain (4 - 6)      Allergies    No Known Allergies    Intolerances        Review of Systems:    General:  No wt loss, fevers, chills, night sweats,fatigue,   Eyes:  Good vision, no reported pain  ENT:  No sore throat, pain, runny nose, dysphagia  CV:  No pain, palpitatioins, hypo/hypertension  Resp:  No dyspnea, cough, tachypnea, wheezing  GI:  No pain, No nausea, No vomiting, No diarrhea, No constipatiion, No weight loss, No fever, No pruritis, No rectal bleeding, No tarry stools, No dysphagia,  :  No pain, bleeding, incontinence, nocturia  Muscle:  No pain, weakness  Neuro:  No weakness, tingling, memory problems  Psych:  No fatigue, insomnia, mood problems, depression  Endocrine:  No polyuria, polydypsia, cold/heat intolerance  Heme:  No petechiae, ecchymosis, easy bruisability  Skin:  No rash, tattoos, scars, edema      Vital Signs Last 24 Hrs  T(C): 36.5 (05 Nov 2017 05:31), Max: 36.9 (04 Nov 2017 13:25)  T(F): 97.7 (05 Nov 2017 05:31), Max: 98.4 (04 Nov 2017 13:25)  HR: 75 (05 Nov 2017 05:31) (74 - 77)  BP: 170/62 (05 Nov 2017 05:31) (147/79 - 170/62)  BP(mean): --  RR: 16 (05 Nov 2017 05:31) (16 - 16)  SpO2: 93% (05 Nov 2017 05:31) (93% - 96%)    PHYSICAL EXAM:    Constitutional: NAD, well-developed  HEENT: EOMI, throat clear  Neck: No LAD, supple  Respiratory: CTA and P  Cardiovascular: S1 and S2, RRR, no M  Gastrointestinal: BS+, soft, NT/ND, neg HSM,  Extremities: No peripheral edema, neg clubing, cyanosis  Vascular: 2+ peripheral pulses  Neurological: A/O x 3, no focal deficits  Psychiatric: Normal mood, normal affect  Skin: No rashes      LABS:                        11.7   8.6   )-----------( 354      ( 04 Nov 2017 07:51 )             37.5     11-04    138  |  105  |  13  ----------------------------<  114<H>  4.3   |  21<L>  |  0.90    Ca    8.6      04 Nov 2017 07:51  Phos  4.0     11-04  Mg     1.9     11-04      PT/INR - ( 04 Nov 2017 07:51 )   PT: 13.3 sec;   INR: 1.22 ratio         PTT - ( 04 Nov 2017 07:51 )  PTT:33.2 sec      RADIOLOGY & ADDITIONAL TESTS:

## 2017-11-05 NOTE — PROGRESS NOTE ADULT - PROBLEM SELECTOR PLAN 5
likely source of abdominal pain - resolved s/p smog enema  having diarrea will hold all laxatives  scheduled for EGD in am, NPO after MN

## 2017-11-05 NOTE — PROGRESS NOTE ADULT - PROBLEM SELECTOR PLAN 6
will stop laxatives, and stool softener   change CLD to regular diet  cancel colonoscopy for tomorrow, but is still ok to give consent for EGD  npo after midnight for EGD in AM

## 2017-11-06 ENCOUNTER — RESULT REVIEW (OUTPATIENT)
Age: 82
End: 2017-11-06

## 2017-11-06 ENCOUNTER — TRANSCRIPTION ENCOUNTER (OUTPATIENT)
Age: 82
End: 2017-11-06

## 2017-11-06 LAB
ALBUMIN SERPL ELPH-MCNC: 2.4 G/DL — LOW (ref 3.3–5)
ALP SERPL-CCNC: 70 U/L — SIGNIFICANT CHANGE UP (ref 40–120)
ALT FLD-CCNC: 17 U/L — SIGNIFICANT CHANGE UP (ref 12–78)
ANION GAP SERPL CALC-SCNC: 9 MMOL/L — SIGNIFICANT CHANGE UP (ref 5–17)
AST SERPL-CCNC: 11 U/L — LOW (ref 15–37)
BILIRUB SERPL-MCNC: 0.7 MG/DL — SIGNIFICANT CHANGE UP (ref 0.2–1.2)
BUN SERPL-MCNC: 11 MG/DL — SIGNIFICANT CHANGE UP (ref 7–23)
CALCIUM SERPL-MCNC: 7.9 MG/DL — LOW (ref 8.5–10.1)
CHLORIDE SERPL-SCNC: 111 MMOL/L — HIGH (ref 96–108)
CO2 SERPL-SCNC: 25 MMOL/L — SIGNIFICANT CHANGE UP (ref 22–31)
CREAT SERPL-MCNC: 0.99 MG/DL — SIGNIFICANT CHANGE UP (ref 0.5–1.3)
GLUCOSE SERPL-MCNC: 101 MG/DL — HIGH (ref 70–99)
POTASSIUM SERPL-MCNC: 3.2 MMOL/L — LOW (ref 3.5–5.3)
POTASSIUM SERPL-SCNC: 3.2 MMOL/L — LOW (ref 3.5–5.3)
PROT SERPL-MCNC: 6.3 G/DL — SIGNIFICANT CHANGE UP (ref 6–8.3)
SODIUM SERPL-SCNC: 145 MMOL/L — SIGNIFICANT CHANGE UP (ref 135–145)
VANCOMYCIN TROUGH SERPL-MCNC: 19.1 UG/ML — SIGNIFICANT CHANGE UP (ref 10–20)

## 2017-11-06 PROCEDURE — 88312 SPECIAL STAINS GROUP 1: CPT | Mod: 26

## 2017-11-06 PROCEDURE — 88305 TISSUE EXAM BY PATHOLOGIST: CPT | Mod: 26

## 2017-11-06 PROCEDURE — 99233 SBSQ HOSP IP/OBS HIGH 50: CPT

## 2017-11-06 RX ORDER — TRAMADOL HYDROCHLORIDE 50 MG/1
50 TABLET ORAL THREE TIMES A DAY
Qty: 0 | Refills: 0 | Status: DISCONTINUED | OUTPATIENT
Start: 2017-11-06 | End: 2017-11-06

## 2017-11-06 RX ORDER — TRAMADOL HYDROCHLORIDE 50 MG/1
50 TABLET ORAL EVERY 8 HOURS
Qty: 0 | Refills: 0 | Status: DISCONTINUED | OUTPATIENT
Start: 2017-11-06 | End: 2017-11-07

## 2017-11-06 RX ORDER — POTASSIUM CHLORIDE 20 MEQ
10 PACKET (EA) ORAL
Qty: 0 | Refills: 0 | Status: COMPLETED | OUTPATIENT
Start: 2017-11-06 | End: 2017-11-06

## 2017-11-06 RX ADMIN — Medication 100 MILLIGRAM(S): at 17:52

## 2017-11-06 RX ADMIN — TRAMADOL HYDROCHLORIDE 50 MILLIGRAM(S): 50 TABLET ORAL at 22:15

## 2017-11-06 RX ADMIN — GABAPENTIN 100 MILLIGRAM(S): 400 CAPSULE ORAL at 21:18

## 2017-11-06 RX ADMIN — ATORVASTATIN CALCIUM 40 MILLIGRAM(S): 80 TABLET, FILM COATED ORAL at 21:18

## 2017-11-06 RX ADMIN — SODIUM CHLORIDE 100 MILLILITER(S): 9 INJECTION INTRAMUSCULAR; INTRAVENOUS; SUBCUTANEOUS at 17:50

## 2017-11-06 RX ADMIN — LIDOCAINE 1 APPLICATION(S): 4 CREAM TOPICAL at 12:17

## 2017-11-06 RX ADMIN — Medication 100 MILLIEQUIVALENT(S): at 14:40

## 2017-11-06 RX ADMIN — Medication 1 TABLET(S): at 12:17

## 2017-11-06 RX ADMIN — PIPERACILLIN AND TAZOBACTAM 25 GRAM(S): 4; .5 INJECTION, POWDER, LYOPHILIZED, FOR SOLUTION INTRAVENOUS at 14:40

## 2017-11-06 RX ADMIN — LOSARTAN POTASSIUM 100 MILLIGRAM(S): 100 TABLET, FILM COATED ORAL at 05:33

## 2017-11-06 RX ADMIN — GABAPENTIN 100 MILLIGRAM(S): 400 CAPSULE ORAL at 05:33

## 2017-11-06 RX ADMIN — GABAPENTIN 100 MILLIGRAM(S): 400 CAPSULE ORAL at 13:34

## 2017-11-06 RX ADMIN — Medication 166.67 MILLIGRAM(S): at 17:52

## 2017-11-06 RX ADMIN — TRAMADOL HYDROCHLORIDE 50 MILLIGRAM(S): 50 TABLET ORAL at 23:30

## 2017-11-06 RX ADMIN — Medication 100 MILLIEQUIVALENT(S): at 12:23

## 2017-11-06 RX ADMIN — PANTOPRAZOLE SODIUM 40 MILLIGRAM(S): 20 TABLET, DELAYED RELEASE ORAL at 05:35

## 2017-11-06 RX ADMIN — PANTOPRAZOLE SODIUM 40 MILLIGRAM(S): 20 TABLET, DELAYED RELEASE ORAL at 17:53

## 2017-11-06 RX ADMIN — PIPERACILLIN AND TAZOBACTAM 25 GRAM(S): 4; .5 INJECTION, POWDER, LYOPHILIZED, FOR SOLUTION INTRAVENOUS at 05:34

## 2017-11-06 RX ADMIN — Medication 100 MILLIGRAM(S): at 05:33

## 2017-11-06 RX ADMIN — PIPERACILLIN AND TAZOBACTAM 25 GRAM(S): 4; .5 INJECTION, POWDER, LYOPHILIZED, FOR SOLUTION INTRAVENOUS at 21:20

## 2017-11-06 RX ADMIN — Medication 100 MILLIEQUIVALENT(S): at 13:33

## 2017-11-06 NOTE — PROGRESS NOTE ADULT - ASSESSMENT
82F PMHx Afib on Coumadin, HLD, CVA, MVP, OA, HTN, PVD (s/p RLE bypassx3, most recently 2012), Neuropathy, GERD presents with pain and swelling to right second toe a/w RLE cellulitis, r/o osteomyelitis. Found to have symptomatic anemia     -Patient converted back to SR, continue Cardizem 60mg q6 and metoprolol 100 BID. HR and BP acceptable.  -No evidence of acute ischemia or meaningful volume overload  -Not on anticoagulation INR 1.22, plan for EGD/Colonoscopy today, s/p ERCP with CBD stone removal and stent    -Plan to resume AC once cleared by GI  -Extensive history of PVD and stenotic valvular disease. Patient high risk but optimized from a cardiovascular condition to proceed with low risk EGD procedure. Routine hemodynamic monitoring is suggested during procedure and immediate postop.   -Patient remains undecided about amputation  -TTE (Nov 4/17): Technically limited study. normal left ventricular size and systolic function, estimated LVEF of 65%. Grade 1 diastolic dysfunction. Normal biatrial size. The mitral valve is structurally normal, 1+ MR.  -Monitor electrolytes, keep K>4, Mg>2  -Will continue to follow

## 2017-11-06 NOTE — PROGRESS NOTE ADULT - SUBJECTIVE AND OBJECTIVE BOX
[facility]    Dahiana Bañuelos, Evens, Toney, Terrie, Truman, Filiberto      [phone]    CHIEF COMPLAINT: Patient is a 82y old  Female who presents with a chief complaint of weakness and pain both legs (28 Oct 2017 20:23)    HPI:  82F PMHx Afib on Coumadin, HLD, CVA, MVP, OA, HTN, PVD (s/p RLE bypassx3, most recently 2012), Neuropathy, GERD presents with pain and swelling to right second toe that started 1 month ago. Patient states foot became red, painful, and a small wound with a scab started forming on the top portion of her second toe. It became extremely difficult to bear weight on the foot so she went to see Dr. Isaacs at the wound center and he drained the wound at which point, yellow pus drained from the wound. He prescribed her an antibiotic (she does not recall which one). She denies any fevers, chills, chesst pain or shortness of breat. Admits to joint pain due to her RA.     In ED, pt's vital was wnl. WBC 8.2, Hct: 26.1, lactate 0.8, , CMP revealed elevated bilirubin of 1.5, remainder is wnl , INR: 2.26. CXR showed no acute pathology. X ray of Rt foot showed possible small periarticular erosion medial aspect head of the proximal phalanx which might represent infection/ osteomyelitis. Pt received 1 dose  of  vanco and 1 dose of zosyn and 1 bolus of NS, BCX, UCX was sent in  ED. (28 Oct 2017 13:42)    Follow up: Patient seen and examined at bedside. Plan for EGD today.     Telemetry: NSR     MEDICATIONS  (STANDING):  atorvastatin 40 milliGRAM(s) Oral at bedtime  diltiazem    Tablet 60 milliGRAM(s) Oral every 6 hours  gabapentin 100 milliGRAM(s) Oral three times a day  losartan 100 milliGRAM(s) Oral daily  metoprolol     tartrate 100 milliGRAM(s) Oral two times a day  multivitamin 1 Tablet(s) Oral daily  pantoprazole  Injectable 40 milliGRAM(s) IV Push every 12 hours  piperacillin/tazobactam IVPB. 3.375 Gram(s) IV Intermittent every 8 hours  sodium chloride 0.9%. 500 milliLiter(s) (100 mL/Hr) IV Continuous <Continuous>  vancomycin  IVPB 1250 milliGRAM(s) IV Intermittent every 24 hours    MEDICATIONS  (PRN):  acetaminophen   Tablet. 650 milliGRAM(s) Oral every 6 hours PRN Mild Pain (1 - 3)  lidocaine 5% Ointment 1 Application(s) Topical two times a day PRN neuropathic pain  oxyCODONE    5 mG/acetaminophen 325 mG 2 Tablet(s) Oral every 6 hours PRN Severe Pain (7 - 10)  traMADol 50 milliGRAM(s) Oral every 8 hours PRN Moderate Pain (4 - 6)      REVIEW OF SYSTEMS:  eye, ent, GI, , allergic, dermatologic, musculoskeletal and neurologic are negative except as described above    Vital Signs Last 24 Hrs  T(C): 36.6 (06 Nov 2017 05:15), Max: 36.7 (05 Nov 2017 13:28)  T(F): 97.9 (06 Nov 2017 05:15), Max: 98.1 (05 Nov 2017 13:28)  HR: 72 (06 Nov 2017 05:15) (64 - 77)  BP: 156/81 (06 Nov 2017 05:15) (133/77 - 160/80)  BP(mean): --  RR: 16 (06 Nov 2017 05:15) (16 - 17)  SpO2: 94% (06 Nov 2017 05:15) (93% - 95%)    I&O's Summary    05 Nov 2017 07:01  -  06 Nov 2017 07:00  --------------------------------------------------------  IN: 2850 mL / OUT: 0 mL / NET: 2850 mL      PHYSICAL EXAM:  Constitutional: elderly female in NAD   Pulmonary: Non-labored, breath sounds are clear bilaterally, No wheezing, rales or rhonchi  Cardiovascular: regular, S1 and S2.  No murmur.  No rubs, gallops or clicks  Gastrointestinal: Bowel Sounds present, soft, nontender.   Lymph: No peripheral edema.   Neurological: Alert, no focal deficits  Skin: R second toe mildly erythematous, lesion blackened/ necrotic appearing   Psych: sad, tearful      LABS: All Labs Reviewed:                        11.7   8.6   )-----------( 354      ( 04 Nov 2017 07:51 )             37.5     04 Nov 2017 07:51    138    |  105    |  13     ----------------------------<  114    4.3     |  21     |  0.90     Ca    8.6        04 Nov 2017 07:51  Phos  4.0       04 Nov 2017 07:51  Mg     1.9       04 Nov 2017 07:51      ECG:  < from: 12 Lead ECG (10.28.17 @ 13:32) >  Ventricular Rate 81 BPM    Atrial Rate 81 BPM    P-R Interval 172 ms    QRS Duration 96 ms    Q-T Interval 416 ms    QTC Calculation(Bezet) 483 ms    P Axis 16 degrees    R Axis -34 degrees    T Axis 8 degrees    Diagnosis Line Normal sinus rhythm  Left axis deviation  Abnormal ECG  When compared with ECG of 14-JUN-2017 11:52,  No significant change was found  Confirmed by Evens GILBERT, Campbell ([id number]) on 10/29/2017 1:49:49 PM    < end of copied text >    Echo:  < from: TTE Echo Doppler w/o Cont (11.04.17 @ 08:47) >   EXAM:  ECHO TTE W/O CON COMP W/DOPPLR         PROCEDURE DATE:  11/04/2017        INTERPRETATION:  INDICATION: aortic valve disease    Blood Pressure 167/83    Height 152     Weight 51       BSA 1.5    Dimensions:    LA 3.2       Normal Values: 2.0- 4.0 cm    Ao 3.0        Normal Values: 2.0 - 3.8 cm  SEPTUM 1.0       Normal Values: 0.6 - 1.2 cm  PWT 1.0       Normal Values: 0.6 - 1.1 cm  LVIDd 3.5         Normal Values: 3.0 - 5.6 cm  LVIDs 2.6         Normal Values: 1.8 - 4.0 cm    Derived Variables:  LVMI     g/m2  RWT      Fractional Short      Ejection Fraction 65    Doppler Peak v. AoV=   (m/sec)    OBSERVATIONS:    Mitral Valve: normal, 1+ MR.  Aortic Valve/Aorta: Calcified trileaflet aortic valve. Minimal AI  Tricuspid Valve: normalwith trace TR.  Pulmonic Valve: normal  Left Atrium: normal  Right Atrium: normal  Left Ventricle: Technically limited study. As visualized, there is normal   LV size and systolic function, estimated LVEF of 65%.  Right Ventricle: Not well visualized, probably normal  Pericardium/Pleura: no significant pleural effusion, no significant   pericardial effusion.  Pulmonary/RV Pressure: Inadequate TR jet to estimate PA systolic pressure.  LV Diastolic Function: Grade 1 diastolic dysfunction.     Technically limited study. As visualized, there is normal left   ventricular size and systolic function, estimated LVEF of 65%. The right   ventricle is not well seen, but is probably normal in size with normal   systolic function. Grade 1 diastolic dysfunction. Normal biatrial size.   The aortic root is normal in size. The mitral valve is structurally   normal, 1+ MR. The aortic valve is calcified without stenosis. There is   minimal aortic insufficiency. Trace physiologic TR. Inadequate TR jet to   estimate PA systolic pressure. No significant pericardial effusion.                  FREDI DALAL M.D., ATTENDING CARDIOLOGIST  This document has been electronically signed. Nov 4 2017  5:20PM    < end of copied text >

## 2017-11-06 NOTE — PROGRESS NOTE ADULT - SUBJECTIVE AND OBJECTIVE BOX
I spoke with daughter this morning.  She says her mother had a major liver resection in the 1980s for a large benign lesion, through the right subcostal incision.   This increases chances that lap rox would be converted to open surgery.  With this information, and with the fact that she has never been symptomatic from common duct stones that were present since at least 2014, I am not recommending cholecystectomy unless she becomes symptomatic or develops cholangitis in the future.  I discussed risks and benefits with daughter

## 2017-11-06 NOTE — PROGRESS NOTE ADULT - PROBLEM SELECTOR PLAN 3
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 continue to monitor H/h  continue to transfuse if hb< 7  continue protonix 40 mg BID IV  hold all AC  for endoscopy today am. NPO since midnight. RCRI score of 1. pt is considered intermediate risk for low risk procedure (EGD/colon)  to resume AC after endoscopy if acceptable

## 2017-11-06 NOTE — PROGRESS NOTE ADULT - PROBLEM SELECTOR PLAN 5
likely source of abdominal pain - resolved s/p smog enema  having diarrhea will hold all laxatives for now  scheduled for EGD today  am was npo NPO after MN

## 2017-11-06 NOTE — PROGRESS NOTE ADULT - PROBLEM SELECTOR PLAN 1
CT abdomen showin mm common hepatic duct calculus with mild biliary dilatation.   s/p ercp with stone extraction  as per surgery (Dr. Lamb) : No lap rox as high chance of conversion to open surgery given previous surgeries and given that patient is not symptomatic at this time

## 2017-11-06 NOTE — PROGRESS NOTE ADULT - ATTENDING COMMENTS
82F PMHx Afib on Coumadin, HLD, CVA, MVP, OA, HTN, PVD (s/p RLE bypassx3, most recently ), Neuropathy, GERD presents with ischemic ulcer of R foot 2nd digit. Incidentally found w/ nonobstructing hepatic duct stone.       ·  Problem: Calculus of hepatic duct without obstruction.  Plan: CT abdomen showin mm common hepatic duct calculus with mild biliary dilatation.   s/p ercp with stone extraction  as per surgery (Dr. Lamb) : No lap rox as high chance of conversion to open surgery given previous surgeries and given that patient is not symptomatic at this time.      Problem/Plan - 2:  ·  Problem: Other acute osteomyelitis of right foot.  Plan: angiogram of RLE shows extensive disease. options discussed w/ vascular surgery and discussed briefly with the patient. Recommends holding digital amputation on right lower extremity at this time due to poor healing capability from poor blood flow. pt is advised to f/u with primary vascular as outpt   continue vanco/zosyn for now. f/u vanc trough, ID follow up for duration   blood cx and urine cx NTD  vascular/ID/podiatry following.

## 2017-11-06 NOTE — PROGRESS NOTE ADULT - PROBLEM SELECTOR PLAN 2
angiogram of RLE shows extensive disease. options discussed w/ vascular surgery and discussed briefly with the patient. Recommends holding digital amputation on right lower extremity at this time due to poor healing capability from poor blood flow. To address further after Endoscopy  continue vanco/zosyn for now. f/u vanc trough  blood cx and urine cx NTD  vascular/ID/podiatry following

## 2017-11-06 NOTE — PROGRESS NOTE ADULT - PROBLEM SELECTOR PLAN 4
patient currently in normal sinus rhythms  continue cardizem and metoprolol with parameters  maintain HR < 110  Anticoagulation held due to anemia and evaluation for GIB. Re-evaluate after endoscopy  continue to monitor on Tele  f/u cardiology (Dr. De Los Santos)

## 2017-11-06 NOTE — PROGRESS NOTE ADULT - SUBJECTIVE AND OBJECTIVE BOX
Patient is a 82y old  Female who presents with a chief complaint of weakness and pain both legs (28 Oct 2017 20:23)      HPI:  2)  Patient was    INTERVAL HPI/OVERNIGHT EVENTS:  T(C): 36.5 (11-06-17 @ 10:44), Max: 36.8 (11-06-17 @ 08:00)  HR: 84 (11-06-17 @ 10:44) (64 - 84)  BP: 153/96 (11-06-17 @ 10:44) (133/77 - 160/80)  RR: 12 (11-06-17 @ 10:44) (12 - 17)  SpO2: 96% (11-06-17 @ 10:44) (93% - 96%)  Wt(kg): --  I&O's Summary    05 Nov 2017 07:01  -  06 Nov 2017 07:00  --------------------------------------------------------  IN: 2850 mL / OUT: 0 mL / NET: 2850 mL        REVIEW OF SYSTEMS:  CONSTITUTIONAL: No fever, weight loss, or fatigue  EYES: No eye pain, visual disturbances, or discharge  ENMT:  No difficulty hearing, tinnitus, vertigo; No sinus or throat pain  NECK: No pain or stiffness  BREASTS: No pain, no masses,   RESPIRATORY: No cough, wheezing, chills or hemoptysis; No shortness of breath  CARDIOVASCULAR: No chest pain, palpitations, dizziness, or leg swelling  GASTROINTESTINAL: No abdominal or epigastric pain. No nausea, vomiting, or hematemesis; No diarrhea or constipation. No melena or hematochezia.  GENITOURINARY: No dysuria, frequency, hematuria, or incontinence  NEUROLOGICAL: No headaches, memory loss, loss of strength, numbness, or tremors  SKIN: No itching, burning, rashes, or lesions   LYMPH NODES: No enlarged glands  MUSCULOSKELETAL: No joint pain or swelling; No muscle, back, or extremity pain  PSYCHIATRIC: No depression, anxiety, mood swings, or difficulty sleeping  ALLERY No hives or eczema    PHYSICAL EXAM:  GENERAL: NAD, well-groomed, well-developed  HEAD:  Atraumatic, Normocephalic  EYES: EOMI, PERRLA, conjunctiva and sclera clear  ENMT: No tonsillar erythema, exudates, or enlargement; Moist mucous membranes, Good dentition, No lesions  NECK: Supple, No JVD, Normal thyroid  NERVOUS SYSTEM:  Alert & Oriented X3, Good concentration; Motor Strength 5/5 B/L upper and lower extremities; DTRs 2+ intact and symmetric  CHEST/LUNG: Clear to percussion bilaterally; No rales, rhonchi, wheezing, or rubs  HEART: Regular rate and rhythm; No murmurs, rubs, or gallops  ABDOMEN: Soft, Nontender, Nondistended; Bowel sounds present  EXTREMITIES:  2+ Peripheral Pulses, No clubbing, cyanosis, or edema  LYMPH: No lymphadenopathy noted  SKIN: No rashes or lesions    MEDICATIONS  (STANDING):  atorvastatin 40 milliGRAM(s) Oral at bedtime  diltiazem    Tablet 60 milliGRAM(s) Oral every 6 hours  gabapentin 100 milliGRAM(s) Oral three times a day  losartan 100 milliGRAM(s) Oral daily  metoprolol     tartrate 100 milliGRAM(s) Oral two times a day  multivitamin 1 Tablet(s) Oral daily  pantoprazole  Injectable 40 milliGRAM(s) IV Push every 12 hours  piperacillin/tazobactam IVPB. 3.375 Gram(s) IV Intermittent every 8 hours  potassium chloride  10 mEq/100 mL IVPB 10 milliEquivalent(s) IV Intermittent every 1 hour  sodium chloride 0.9%. 500 milliLiter(s) (100 mL/Hr) IV Continuous <Continuous>  vancomycin  IVPB 1250 milliGRAM(s) IV Intermittent every 24 hours    MEDICATIONS  (PRN):  acetaminophen   Tablet. 650 milliGRAM(s) Oral every 6 hours PRN Mild Pain (1 - 3)  lidocaine 5% Ointment 1 Application(s) Topical two times a day PRN neuropathic pain  oxyCODONE    5 mG/acetaminophen 325 mG 2 Tablet(s) Oral every 6 hours PRN Severe Pain (7 - 10)  traMADol 50 milliGRAM(s) Oral every 8 hours PRN Moderate Pain (4 - 6)      LABS:    11-06    145  |  111<H>  |  11  ----------------------------<  101<H>  3.2<L>   |  25  |  0.99    Ca    7.9<L>      06 Nov 2017 09:07    TPro  6.3  /  Alb  2.4<L>  /  TBili  0.7  /  DBili  x   /  AST  11<L>  /  ALT  17  /  AlkPhos  70  11-06 Patient is a 82y old  Female who presents with a chief complaint of weakness and pain both legs (28 Oct 2017 20:23)      HPI:  Patient had no overnight events. She has been rate controlled all night as per the telemetry monitor. Patient was seen today morning at the bedside and offers no complaints. Denies fevers, sob, chest pain, palpitations nausea, vomiting, diarrhea, or abdominal pain. She is ready for her endoscopy today.    INTERVAL HPI/OVERNIGHT EVENTS:  T(C): 36.5 (11-06-17 @ 10:44), Max: 36.8 (11-06-17 @ 08:00)  HR: 84 (11-06-17 @ 10:44) (64 - 84)  BP: 153/96 (11-06-17 @ 10:44) (133/77 - 160/80)  RR: 12 (11-06-17 @ 10:44) (12 - 17)  SpO2: 96% (11-06-17 @ 10:44) (93% - 96%)  Wt(kg): --  I&O's Summary    05 Nov 2017 07:01  -  06 Nov 2017 07:00  --------------------------------------------------------  IN: 2850 mL / OUT: 0 mL / NET: 2850 mL    REVIEW OF SYSTEMS:  CONSTITUTIONAL: denies fever, chills, fatigue, weakness  HEENT: denies blurred vision, sore throat  SKIN: denies new lesions, rash  CARDIOVASCULAR: denies chest pain, chest pressure, palpitations  RESPIRATORY: denies shortness of breath, sputum production  GASTROINTESTINAL: denies nausea, vomiting, diarrhea, abdominal pain, dyschezia  GENITOURINARY: denies dysuria, discharge  NEUROLOGICAL: denies numbness, headache, focal weakness  MUSCULOSKELETAL: denies new joint pain, muscle aches  PSYCH: denies anxiety depression at this time    PHYSICAL EXAM:  GENERAL: NAD,   HEAD:  Atraumatic, Normocephalic  EYES: EOMI, fixed dilated pupil on the left  NERVOUS SYSTEM:  Alert & Oriented X3, poor concentration; Motor Strength 5/5 B/L upper and lower extremities;   CHEST/LUNG: Clear to percussion bilaterally; No rales, rhonchi, wheezing, or rubs  HEART: irregular rate and rhythm; No murmurs, rubs, or gallops  ABDOMEN: Soft, Nontender, Nondistended; Bowel sounds present  EXTREMITIES:  trace Peripheral Pulses in bilateral lower extremities, b/l feet are warm, tender to palpation on the right foot at the toe    SKIN: right foot, 2nd toe lesion blackened with necrotic appearance at tip, with blueish discoloration, trace erythema distal to surgical pen marking markedly improved.    MEDICATIONS  (STANDING):  atorvastatin 40 milliGRAM(s) Oral at bedtime  diltiazem    Tablet 60 milliGRAM(s) Oral every 6 hours  gabapentin 100 milliGRAM(s) Oral three times a day  losartan 100 milliGRAM(s) Oral daily  metoprolol     tartrate 100 milliGRAM(s) Oral two times a day  multivitamin 1 Tablet(s) Oral daily  pantoprazole  Injectable 40 milliGRAM(s) IV Push every 12 hours  piperacillin/tazobactam IVPB. 3.375 Gram(s) IV Intermittent every 8 hours  potassium chloride  10 mEq/100 mL IVPB 10 milliEquivalent(s) IV Intermittent every 1 hour  sodium chloride 0.9%. 500 milliLiter(s) (100 mL/Hr) IV Continuous <Continuous>  vancomycin  IVPB 1250 milliGRAM(s) IV Intermittent every 24 hours    MEDICATIONS  (PRN):  acetaminophen   Tablet. 650 milliGRAM(s) Oral every 6 hours PRN Mild Pain (1 - 3)  lidocaine 5% Ointment 1 Application(s) Topical two times a day PRN neuropathic pain  oxyCODONE    5 mG/acetaminophen 325 mG 2 Tablet(s) Oral every 6 hours PRN Severe Pain (7 - 10)  traMADol 50 milliGRAM(s) Oral every 8 hours PRN Moderate Pain (4 - 6)      LABS:    11-06    145  |  111<H>  |  11  ----------------------------<  101<H>  3.2<L>   |  25  |  0.99    Ca    7.9<L>      06 Nov 2017 09:07    TPro  6.3  /  Alb  2.4<L>  /  TBili  0.7  /  DBili  x   /  AST  11<L>  /  ALT  17  /  AlkPhos  70  11-06

## 2017-11-07 DIAGNOSIS — I73.9 PERIPHERAL VASCULAR DISEASE, UNSPECIFIED: ICD-10-CM

## 2017-11-07 LAB
ALBUMIN SERPL ELPH-MCNC: 2.3 G/DL — LOW (ref 3.3–5)
ALP SERPL-CCNC: 68 U/L — SIGNIFICANT CHANGE UP (ref 40–120)
ALT FLD-CCNC: 16 U/L — SIGNIFICANT CHANGE UP (ref 12–78)
ANION GAP SERPL CALC-SCNC: 10 MMOL/L — SIGNIFICANT CHANGE UP (ref 5–17)
AST SERPL-CCNC: 8 U/L — LOW (ref 15–37)
BILIRUB SERPL-MCNC: 0.5 MG/DL — SIGNIFICANT CHANGE UP (ref 0.2–1.2)
BUN SERPL-MCNC: 12 MG/DL — SIGNIFICANT CHANGE UP (ref 7–23)
CALCIUM SERPL-MCNC: 7.8 MG/DL — LOW (ref 8.5–10.1)
CHLORIDE SERPL-SCNC: 109 MMOL/L — HIGH (ref 96–108)
CO2 SERPL-SCNC: 24 MMOL/L — SIGNIFICANT CHANGE UP (ref 22–31)
CREAT SERPL-MCNC: 0.96 MG/DL — SIGNIFICANT CHANGE UP (ref 0.5–1.3)
GLUCOSE SERPL-MCNC: 95 MG/DL — SIGNIFICANT CHANGE UP (ref 70–99)
HCT VFR BLD CALC: 32.4 % — LOW (ref 34.5–45)
HGB BLD-MCNC: 10 G/DL — LOW (ref 11.5–15.5)
MCHC RBC-ENTMCNC: 28.5 PG — SIGNIFICANT CHANGE UP (ref 27–34)
MCHC RBC-ENTMCNC: 31 GM/DL — LOW (ref 32–36)
MCV RBC AUTO: 92 FL — SIGNIFICANT CHANGE UP (ref 80–100)
PLATELET # BLD AUTO: 277 K/UL — SIGNIFICANT CHANGE UP (ref 150–400)
POTASSIUM SERPL-MCNC: 3.1 MMOL/L — LOW (ref 3.5–5.3)
POTASSIUM SERPL-SCNC: 3.1 MMOL/L — LOW (ref 3.5–5.3)
PROT SERPL-MCNC: 5.9 G/DL — LOW (ref 6–8.3)
RBC # BLD: 3.52 M/UL — LOW (ref 3.8–5.2)
RBC # FLD: 14.7 % — HIGH (ref 10.3–14.5)
SODIUM SERPL-SCNC: 143 MMOL/L — SIGNIFICANT CHANGE UP (ref 135–145)
WBC # BLD: 12.3 K/UL — HIGH (ref 3.8–10.5)
WBC # FLD AUTO: 12.3 K/UL — HIGH (ref 3.8–10.5)

## 2017-11-07 PROCEDURE — 99233 SBSQ HOSP IP/OBS HIGH 50: CPT

## 2017-11-07 PROCEDURE — 99222 1ST HOSP IP/OBS MODERATE 55: CPT

## 2017-11-07 RX ORDER — OXYCODONE AND ACETAMINOPHEN 5; 325 MG/1; MG/1
2 TABLET ORAL EVERY 6 HOURS
Qty: 0 | Refills: 0 | Status: DISCONTINUED | OUTPATIENT
Start: 2017-11-07 | End: 2017-11-09

## 2017-11-07 RX ORDER — POTASSIUM CHLORIDE 20 MEQ
40 PACKET (EA) ORAL ONCE
Qty: 0 | Refills: 0 | Status: COMPLETED | OUTPATIENT
Start: 2017-11-07 | End: 2017-11-07

## 2017-11-07 RX ORDER — OXYCODONE AND ACETAMINOPHEN 5; 325 MG/1; MG/1
1 TABLET ORAL EVERY 6 HOURS
Qty: 0 | Refills: 0 | Status: DISCONTINUED | OUTPATIENT
Start: 2017-11-07 | End: 2017-11-09

## 2017-11-07 RX ORDER — VANCOMYCIN HCL 1 G
VIAL (EA) INTRAVENOUS
Qty: 0 | Refills: 0 | Status: DISCONTINUED | OUTPATIENT
Start: 2017-11-07 | End: 2017-11-09

## 2017-11-07 RX ORDER — POTASSIUM CHLORIDE 20 MEQ
40 PACKET (EA) ORAL EVERY 4 HOURS
Qty: 0 | Refills: 0 | Status: DISCONTINUED | OUTPATIENT
Start: 2017-11-07 | End: 2017-11-07

## 2017-11-07 RX ORDER — VANCOMYCIN HCL 1 G
1000 VIAL (EA) INTRAVENOUS ONCE
Qty: 0 | Refills: 0 | Status: COMPLETED | OUTPATIENT
Start: 2017-11-07 | End: 2017-11-07

## 2017-11-07 RX ORDER — VANCOMYCIN HCL 1 G
1000 VIAL (EA) INTRAVENOUS EVERY 24 HOURS
Qty: 0 | Refills: 0 | Status: DISCONTINUED | OUTPATIENT
Start: 2017-11-08 | End: 2017-11-09

## 2017-11-07 RX ADMIN — GABAPENTIN 100 MILLIGRAM(S): 400 CAPSULE ORAL at 21:54

## 2017-11-07 RX ADMIN — PANTOPRAZOLE SODIUM 40 MILLIGRAM(S): 20 TABLET, DELAYED RELEASE ORAL at 17:21

## 2017-11-07 RX ADMIN — Medication 40 MILLIEQUIVALENT(S): at 15:33

## 2017-11-07 RX ADMIN — PANTOPRAZOLE SODIUM 40 MILLIGRAM(S): 20 TABLET, DELAYED RELEASE ORAL at 05:07

## 2017-11-07 RX ADMIN — OXYCODONE AND ACETAMINOPHEN 2 TABLET(S): 5; 325 TABLET ORAL at 21:54

## 2017-11-07 RX ADMIN — PIPERACILLIN AND TAZOBACTAM 25 GRAM(S): 4; .5 INJECTION, POWDER, LYOPHILIZED, FOR SOLUTION INTRAVENOUS at 21:54

## 2017-11-07 RX ADMIN — GABAPENTIN 100 MILLIGRAM(S): 400 CAPSULE ORAL at 05:07

## 2017-11-07 RX ADMIN — ATORVASTATIN CALCIUM 40 MILLIGRAM(S): 80 TABLET, FILM COATED ORAL at 21:54

## 2017-11-07 RX ADMIN — GABAPENTIN 100 MILLIGRAM(S): 400 CAPSULE ORAL at 13:25

## 2017-11-07 RX ADMIN — Medication 100 MILLIGRAM(S): at 05:07

## 2017-11-07 RX ADMIN — Medication 100 MILLIGRAM(S): at 17:22

## 2017-11-07 RX ADMIN — Medication 250 MILLIGRAM(S): at 17:21

## 2017-11-07 RX ADMIN — Medication 40 MILLIEQUIVALENT(S): at 10:41

## 2017-11-07 RX ADMIN — SODIUM CHLORIDE 100 MILLILITER(S): 9 INJECTION INTRAMUSCULAR; INTRAVENOUS; SUBCUTANEOUS at 13:25

## 2017-11-07 RX ADMIN — PIPERACILLIN AND TAZOBACTAM 25 GRAM(S): 4; .5 INJECTION, POWDER, LYOPHILIZED, FOR SOLUTION INTRAVENOUS at 13:25

## 2017-11-07 RX ADMIN — Medication 1 TABLET(S): at 11:04

## 2017-11-07 RX ADMIN — PIPERACILLIN AND TAZOBACTAM 25 GRAM(S): 4; .5 INJECTION, POWDER, LYOPHILIZED, FOR SOLUTION INTRAVENOUS at 05:16

## 2017-11-07 RX ADMIN — OXYCODONE AND ACETAMINOPHEN 2 TABLET(S): 5; 325 TABLET ORAL at 22:24

## 2017-11-07 RX ADMIN — LOSARTAN POTASSIUM 100 MILLIGRAM(S): 100 TABLET, FILM COATED ORAL at 05:07

## 2017-11-07 NOTE — PROGRESS NOTE ADULT - ASSESSMENT
83 yo female with Right 2nd digit cellulitis and osteomyelitis      Plan:   Pt seen and examined with attending today   Surgical management pending at this time due to other medical issue  Poss long term treatment with antibiotic via PICC  Wound stable and no progressing infection noted at this time however it is likely chronic OM with vascular disease  Angio show extensive occlusion at multiple lesions at RLE  No dressing needed  Pt having pain to right 2nd digit, pain management required at least before bed time    Pending Vascular AKA vs bypass vs toe amp    Pod cont f/u while pt in house

## 2017-11-07 NOTE — PROGRESS NOTE ADULT - SUBJECTIVE AND OBJECTIVE BOX
T(C): 36.6 (11-07-17 @ 04:58), Max: 37 (11-06-17 @ 14:30)  HR: 66 (11-07-17 @ 04:58) (66 - 95)  BP: 158/67 (11-07-17 @ 04:58) (130/80 - 172/84)  RR: 16 (11-07-17 @ 04:58) (12 - 16)  SpO2: 95% (11-07-17 @ 04:58) (95% - 98%)  Wt(kg): --    Pt seen, doing well, no anesthesia complications or complaints noted or reported.   No Nausea  Pain well controlled

## 2017-11-07 NOTE — PHYSICAL THERAPY INITIAL EVALUATION ADULT - PERTINENT HX OF CURRENT PROBLEM, REHAB EVAL
82F PMHx Afib on Coumadin, HLD, CVA, MVP, OA, HTN, PVD (s/p RLE bypassx3, most recently 2012), Neuropathy, GERD presents with pain and swelling to right second toe a/w RLE cellulitis, r/o osteomyelitis.

## 2017-11-07 NOTE — PROGRESS NOTE ADULT - SUBJECTIVE AND OBJECTIVE BOX
[facility] -- Dahiana Bañuelos, Toney Horner, Truman Falcon Savella  Office # [phone]      Follow Up:  CAD. PAD    Subjective/Observations: Patient seen and examined. Events noted. Resting comfortably in bed. No complaints of chest pain, dyspnea, or palpitations reported.       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  Osteoarthritis  Afib  PVD (peripheral vascular disease)  Neuropathy: (Right lower leg)  H/O cerebral aneurysm repair: brain clips  CVA (cerebral vascular accident): (&quot;Mini-stroke&quot;,1990&#x27;s)  Rheumatoid arthritis  Hyperlipidemia  Hypertension  S/P cataract surgery: (Left eye)  Elective surgery: (&quot;Twisted bowel&quot;, 2014)  Elective surgery: (Exision of cyst on liver, 1985)  S/P ORIF (open reduction internal fixation) fracture: Left hip fx (2012) &amp; R hip fx (2013)  H/O cerebral aneurysm repair: Brain clips (1978(  Renal stone: Cysto stent placement 10/1/2014  PVD (peripheral vascular disease): s/p RLE bypass x 3, most recent 3/2012 Right external iliac to PT bypass w/ PTFE (2012)  S/P FRED (total abdominal hysterectomy): (1987, Hx of &quot;ovarian cancer?&quot;)      MEDICATIONS  (STANDING):  atorvastatin 40 milliGRAM(s) Oral at bedtime  diltiazem    Tablet 60 milliGRAM(s) Oral every 6 hours  gabapentin 100 milliGRAM(s) Oral three times a day  losartan 100 milliGRAM(s) Oral daily  metoprolol     tartrate 100 milliGRAM(s) Oral two times a day  multivitamin 1 Tablet(s) Oral daily  pantoprazole  Injectable 40 milliGRAM(s) IV Push every 12 hours  piperacillin/tazobactam IVPB. 3.375 Gram(s) IV Intermittent every 8 hours  sodium chloride 0.9%. 500 milliLiter(s) (100 mL/Hr) IV Continuous <Continuous>  vancomycin  IVPB 1000 milliGRAM(s) IV Intermittent once  vancomycin  IVPB        MEDICATIONS  (PRN):  acetaminophen   Tablet. 650 milliGRAM(s) Oral every 6 hours PRN Mild Pain (1 - 3)  lidocaine 5% Ointment 1 Application(s) Topical two times a day PRN neuropathic pain  oxyCODONE    5 mG/acetaminophen 325 mG 2 Tablet(s) Oral every 6 hours PRN Severe Pain (7 - 10)  traMADol 50 milliGRAM(s) Oral every 8 hours PRN Moderate Pain (4 - 6)      Allergies    No Known Allergies    Intolerances            Vital Signs Last 24 Hrs  T(C): 36.4 (07 Nov 2017 13:05), Max: 37 (06 Nov 2017 23:57)  T(F): 97.6 (07 Nov 2017 13:05), Max: 98.6 (06 Nov 2017 23:57)  HR: 73 (07 Nov 2017 13:05) (66 - 95)  BP: 152/84 (07 Nov 2017 13:05) (130/80 - 172/84)  BP(mean): --  RR: 18 (07 Nov 2017 13:05) (16 - 18)  SpO2: 97% (07 Nov 2017 13:05) (95% - 98%)    I&O's Summary    06 Nov 2017 07:01  -  07 Nov 2017 07:00  --------------------------------------------------------  IN: 1850 mL / OUT: 0 mL / NET: 1850 mL          PHYSICAL EXAM:  TELE: off  Constitutional: NAD, awake and alert, well-developed  HEENT: Moist Mucous Membranes, Anicteric  Pulmonary: Decreased breath sounds b/l.   Cardiovascular: Regular, S1 and S2,  2/6 SM  Gastrointestinal: Bowel Sounds present, soft, nontender.   Lymph: No peripheral edema. No lymphadenopathy.  Psych:  Mood & affect appropriate    LABS: All Labs Reviewed:                        10.0   12.3  )-----------( 277      ( 07 Nov 2017 08:18 )             32.4     07 Nov 2017 08:18    143    |  109    |  12     ----------------------------<  95     3.1     |  24     |  0.96   06 Nov 2017 09:07    145    |  111    |  11     ----------------------------<  101    3.2     |  25     |  0.99     Ca    7.8        07 Nov 2017 08:18  Ca    7.9        06 Nov 2017 09:07    TPro  5.9    /  Alb  2.3    /  TBili  0.5    /  DBili  x      /  AST  8      /  ALT  16     /  AlkPhos  68     07 Nov 2017 08:18  TPro  6.3    /  Alb  2.4    /  TBili  0.7    /  DBili  x      /  AST  11     /  ALT  17     /  AlkPhos  70     06 Nov 2017 09:07

## 2017-11-07 NOTE — PROGRESS NOTE ADULT - ATTENDING COMMENTS
82F PMHx Afib on Coumadin, HLD, CVA, MVP, OA, HTN, PVD (s/p RLE bypassx3, most recently ), Neuropathy, GERD presents with ischemic ulcer of R foot 2nd digit. Incidentally found w/ nonobstructing hepatic duct stone.       ·  Problem: Calculus of hepatic duct without obstruction.  Plan: CT abdomen showin mm common hepatic duct calculus with mild biliary dilatation.   s/p ercp with stone extraction  as per surgery (Dr. Lamb) : No lap rox as high chance of conversion to open surgery given previous surgeries and given that patient is not symptomatic at this time.      Problem/Plan - 2:  ·  Problem: Other acute osteomyelitis of right foot.  Plan: angiogram of RLE shows extensive disease. options discussed w/ vascular surgery and discussed briefly with the patient. Recommends holding digital amputation on right lower extremity at this time due to poor healing capability from poor blood flow. pt is advised to f/u with primary vascular as outpt   continue vanco/zosyn for now. f/u vanc trough, ID follow up for duration   blood cx and urine cx NTD  vascular/ID/podiatry following for duration of abx

## 2017-11-07 NOTE — PROGRESS NOTE ADULT - SUBJECTIVE AND OBJECTIVE BOX
CARITO CONSTANTINO is a 82yFemale , patient examined and chart reviewed. Patient seen and examined today being followed for infected   right 2nd toe    INTERVAL HPI/ OVERNIGHT EVENTS: Events noted, now there is drainage from the right 2nd toe with gangrenous changes . She has increased pain . 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  Osteoarthritis  Afib  PVD (peripheral vascular disease)  Neuropathy: (Right lower leg)  H/O cerebral aneurysm repair: brain clips  CVA (cerebral vascular accident): (&quot;Mini-stroke&quot;,1990&#x27;s)  Rheumatoid arthritis  Hyperlipidemia  Hypertension  S/P cataract surgery: (Left eye)  Elective surgery: (&quot;Twisted bowel&quot;, 2014)  Elective surgery: (Exision of cyst on liver, 1985)  S/P ORIF (open reduction internal fixation) fracture: Left hip fx (2012) &amp; R hip fx (2013)  H/O cerebral aneurysm repair: Brain clips (1978(  Renal stone: Cysto stent placement 10/1/2014  PVD (peripheral vascular disease): s/p RLE bypass x 3, most recent 3/2012 Right external iliac to PT bypass w/ PTFE (2012)  S/P FRED (total abdominal hysterectomy): (1987, Hx of &quot;ovarian cancer?&quot;)      For details regarding the patient's social history, family history, and other miscellaneous elements, please refer the initial infectious diseases consultation and/or the admitting history and physical examination for this admission.    ROS:  CONSTITUTIONAL:  Negative fever or chills, feels well, good appetite  EYES:  Negative  blurry vision or double vision  CARDIOVASCULAR:  Negative for chest pain or palpitations  RESPIRATORY:  Negative for cough, wheezing, or SOB   GASTROINTESTINAL:  Negative for nausea, vomiting, diarrhea, constipation, or abdominal pain  GENITOURINARY:  Negative frequency, urgency or dysuria  NEUROLOGIC:  No headache, confusion, dizziness, lightheadedness  All other systems were reviewed and are negative         Current inpatient medications :    ANTIBIOTICS/RELEVANT:  piperacillin/tazobactam IVPB. 3.375 Gram(s) IV Intermittent every 8 hours  vancomycin  IVPB 1250 milliGRAM(s) IV Intermittent every 24 hours      acetaminophen   Tablet. 650 milliGRAM(s) Oral every 6 hours PRN  atorvastatin 40 milliGRAM(s) Oral at bedtime  diltiazem    Tablet 60 milliGRAM(s) Oral every 6 hours  gabapentin 100 milliGRAM(s) Oral three times a day  lidocaine 5% Ointment 1 Application(s) Topical two times a day PRN  losartan 100 milliGRAM(s) Oral daily  metoprolol     tartrate 100 milliGRAM(s) Oral two times a day  multivitamin 1 Tablet(s) Oral daily  oxyCODONE    5 mG/acetaminophen 325 mG 2 Tablet(s) Oral every 6 hours PRN  pantoprazole  Injectable 40 milliGRAM(s) IV Push every 12 hours  potassium chloride   Powder 40 milliEquivalent(s) Oral every 4 hours  sodium chloride 0.9%. 500 milliLiter(s) IV Continuous <Continuous>  traMADol 50 milliGRAM(s) Oral every 8 hours PRN      Objective:    11-06 @ 07:01  -  11-07 @ 07:00  --------------------------------------------------------  IN: 1850 mL / OUT: 0 mL / NET: 1850 mL      T(C): 36.4 (11-07-17 @ 13:05), Max: 37 (11-06-17 @ 14:30)  HR: 73 (11-07-17 @ 13:05) (66 - 95)  BP: 152/84 (11-07-17 @ 13:05) (130/80 - 172/84)  RR: 18 (11-07-17 @ 13:05) (14 - 18)  SpO2: 97% (11-07-17 @ 13:05) (95% - 98%)  Wt(kg): --      Physical Exam:  General: well developed well nourished, in no acute distress  Eyes: sclera anicteric, pupils equal and reactive to light  ENMT: buccal mucosa moist, pharynx not injected  Neck: supple, trachea midline  Lungs: clear, no wheeze/rhonchi  Cardiovascular: regular rate and rhythm, S1 S2  Abdomen: soft, nontender, no organomegaly present, bowel sounds normal  Neurological: alert and oriented x3, Cranial Nerves II-XII grossly intact  Skin: no increased ecchymosis/petechiae/purpura  Lymph Nodes: no palpable cervical/supraclavicular lymph nodes enlargements  Extremities: no cyanosis/clubbing/edema right foot- wet gangrene right 2nd toe with STS , tender to touch and bloody draiange     LABS:                          10.0   12.3  )-----------( 277      ( 07 Nov 2017 08:18 )             32.4       11-07    143  |  109<H>  |  12  ----------------------------<  95  3.1<L>   |  24  |  0.96    Ca    7.8<L>      07 Nov 2017 08:18    TPro  5.9<L>  /  Alb  2.3<L>  /  TBili  0.5  /  DBili  x   /  AST  8<L>  /  ALT  16  /  AlkPhos  68  11-07      Vancomycin Level, Trough: 19.1 ug/mL (11-06 @ 17:36)    RECENT CULTURES:    Assessment :    82 y.o. female with multiple medical problems Afib on Coumadin, HLD, CVA, MVP, OA, HTN, PVD (s/p RLE bypassx3, most recently 2012), Neuropathy, GERD  presents with  ischemic right foot and leg coupled with gangrene of right 2nd. toe.  She has multiple failed bypasses of right leg. 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 Her pain is out of proportion to the toe infection and likely is related to ischemic pain right leg .    She is scheduled for angiogram of right leg as recommended by vascular surgery , podiatry still feels the gangrenous toe should be amputated and then try HBOT     She has fecal impaction , most likely cause of abdominal pain.     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 She has severe PVD with occluded grafts       Plan :   - will decrease Vancomycin to 1 gram daily   - continue with Iv antibiotics although doubt if it make any difference. The patient was made aware that she needs amputation of the right 2nd toe as it is now wet gangrene. It may not heal and she may require a AKA as she has severe PVD .  - Discussed with vascular surgery and Podiatry  by phone in detail   - follow ERCP findings   - trend renal function and CBC  - apply dressing to the toe   - overall prognosis is poor. Discussed with Dr. Yakobav     Continue with present regime .  Appropriate use of antibiotics and adverse effects reviewed.    I have discussed the above plan of care with patient and her  family in detail. They expressed understanding of the  treatment plan . Risks, benefits and alternatives discussed in detail. I have asked if they have any questions or concerns and appropriately addressed them to the best of my ability .    > 35 minutes were spent in direct patient care reviewing notes, medications ,labs data/ imaging , discussion with multidisciplinary team.    Thank you for allowing me to participate in care of your patient .    Judi Coyne MD  [phone]

## 2017-11-07 NOTE — PROGRESS NOTE ADULT - SUBJECTIVE AND OBJECTIVE BOX
INTERVAL HPI/OVERNIGHT EVENTS:    denies abd pain, n/v  no blood in stools    HPI:  82F PMHx Afib on Coumadin, HLD, CVA, MVP, OA, HTN, PVD (s/p RLE bypassx3, most recently 2012), Neuropathy, GERD presents with pain and swelling to right second toe that started 1 month ago. Patient states foot became red, painful, and a small wound with a scab started forming on the top portion of her second toe. It became extremely difficult to bear weight on the foot so she went to see Dr. Isaacs at the wound center and he drained the wound at which point, yellow pus drained from the wound. He prescribed her an antibiotic (she does not recall which one). She denies any fevers, chills, chesst pain or shortness of breat. Admits to joint pain due to her RA.     In ED, pt's vital was wnl. WBC 8.2, Hct: 26.1, lactate 0.8, , CMP revealed elevated bilirubin of 1.5, remainder is wnl , INR: 2.26. CXR showed no acute pathology. X ray of Rt foot showed possible small periarticular erosion medial aspect head of the proximal phalanx which might represent infection/ osteomyelitis. Pt received 1 dose  of  vanco and 1 dose of zosyn and 1 bolus of NS, BCX, UCX was sent in  ED. (28 Oct 2017 13:42)    MEDICATIONS  (STANDING):  atorvastatin 40 milliGRAM(s) Oral at bedtime  diltiazem    Tablet 60 milliGRAM(s) Oral every 6 hours  gabapentin 100 milliGRAM(s) Oral three times a day  losartan 100 milliGRAM(s) Oral daily  metoprolol     tartrate 100 milliGRAM(s) Oral two times a day  multivitamin 1 Tablet(s) Oral daily  pantoprazole  Injectable 40 milliGRAM(s) IV Push every 12 hours  piperacillin/tazobactam IVPB. 3.375 Gram(s) IV Intermittent every 8 hours  sodium chloride 0.9%. 500 milliLiter(s) (100 mL/Hr) IV Continuous <Continuous>  vancomycin  IVPB 1000 milliGRAM(s) IV Intermittent once  vancomycin  IVPB        MEDICATIONS  (PRN):  acetaminophen   Tablet. 650 milliGRAM(s) Oral every 6 hours PRN Mild Pain (1 - 3)  lidocaine 5% Ointment 1 Application(s) Topical two times a day PRN neuropathic pain  oxyCODONE    5 mG/acetaminophen 325 mG 2 Tablet(s) Oral every 6 hours PRN Severe Pain (7 - 10)  traMADol 50 milliGRAM(s) Oral every 8 hours PRN Moderate Pain (4 - 6)      Allergies    No Known Allergies    Intolerances          General:  No wt loss, fevers, chills, night sweats, fatigue,   Eyes:  Good vision, no reported pain  ENT:  No sore throat, pain, runny nose, dysphagia  CV:  No pain, palpitations, hypo/hypertension  Resp:  No dyspnea, cough, tachypnea, wheezing  GI:  No pain, No nausea, No vomiting, No diarrhea, No constipation, No weight loss, No fever, No pruritis, No rectal bleeding, No tarry stools, No dysphagia,  :  No pain, bleeding, incontinence, nocturia  Muscle:  No pain, weakness  Neuro:  No weakness, tingling, memory problems  Psych:  No fatigue, insomnia, mood problems, depression  Endocrine:  No polyuria, polydipsia, cold/heat intolerance  Heme:  No petechiae, ecchymosis, easy bruisability  Skin:  No rash, tattoos, scars, edema      PHYSICAL EXAM:   Vital Signs:  Vital Signs Last 24 Hrs  T(C): 36.4 (07 Nov 2017 13:05), Max: 37 (06 Nov 2017 23:57)  T(F): 97.6 (07 Nov 2017 13:05), Max: 98.6 (06 Nov 2017 23:57)  HR: 71 (07 Nov 2017 17:08) (66 - 95)  BP: 146/81 (07 Nov 2017 17:08) (130/80 - 172/84)  BP(mean): --  RR: 17 (07 Nov 2017 17:08) (16 - 18)  SpO2: 95% (07 Nov 2017 17:08) (95% - 98%)  Daily     Daily I&O's Summary    06 Nov 2017 07:01  -  07 Nov 2017 07:00  --------------------------------------------------------  IN: 1850 mL / OUT: 0 mL / NET: 1850 mL        GENERAL:  Appears stated age, well-groomed, well-nourished, no distress  HEENT:  NC/AT,  conjunctivae clear and pink, no thyromegaly, nodules, adenopathy, no JVD, sclera -anicteric  CHEST:  Full & symmetric excursion, no increased effort, breath sounds clear  HEART:  Regular rhythm, S1, S2, no murmur/rub/S3/S4, no abdominal bruit, no edema  ABDOMEN:  Soft, non-tender, non-distended, normoactive bowel sounds,  no masses ,no hepato-splenomegaly, no signs of chronic liver disease  EXTEREMITIES:  no cyanosis,clubbing or edema  SKIN:  No rash/erythema/ecchymoses/petechiae/wounds/abscess/warm/dry  NEURO:  Alert, oriented, no asterixis, no tremor, no encephalopathy      LABS:                        10.0   12.3  )-----------( 277      ( 07 Nov 2017 08:18 )             32.4     11-07    143  |  109<H>  |  12  ----------------------------<  95  3.1<L>   |  24  |  0.96    Ca    7.8<L>      07 Nov 2017 08:18    TPro  5.9<L>  /  Alb  2.3<L>  /  TBili  0.5  /  DBili  x   /  AST  8<L>  /  ALT  16  /  AlkPhos  68  11-07        amylase   lipase  RADIOLOGY & ADDITIONAL TESTS:

## 2017-11-07 NOTE — CONSULT NOTE ADULT - PROVIDER SPECIALTY LIST ADULT
Cardiology
Gastroenterology
Heme/Onc
Infectious Disease
Podiatry
Vascular Surgery
Surgery
Vascular Surgery

## 2017-11-07 NOTE — CONSULT NOTE ADULT - SUBJECTIVE AND OBJECTIVE BOX
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  Osteoarthritis  Afib  PVD (peripheral vascular disease)  Neuropathy: (Right lower leg)  H/O cerebral aneurysm repair: brain clips  CVA (cerebral vascular accident): (&quot;Mini-stroke&quot;,1990&#x27;s)  Rheumatoid arthritis  Hyperlipidemia  Hypertension  S/P cataract surgery: (Left eye)  Elective surgery: (&quot;Twisted bowel&quot;, 2014)  Elective surgery: (Exision of cyst on liver, 1985)  S/P ORIF (open reduction internal fixation) fracture: Left hip fx (2012) &amp; R hip fx (2013)  H/O cerebral aneurysm repair: Brain clips (1978(  Renal stone: Cysto stent placement 10/1/2014  PVD (peripheral vascular disease): s/p RLE bypass x 3, most recent 3/2012 Right external iliac to PT bypass w/ PTFE (2012)  S/P FRED (total abdominal hysterectomy): (1987, Hx of &quot;ovarian cancer?&quot;)      Allergies    No Known Allergies    Intolerances        MEDICATIONS  (STANDING):  atorvastatin 40 milliGRAM(s) Oral at bedtime  diltiazem    Tablet 60 milliGRAM(s) Oral every 6 hours  gabapentin 100 milliGRAM(s) Oral three times a day  losartan 100 milliGRAM(s) Oral daily  metoprolol     tartrate 100 milliGRAM(s) Oral two times a day  multivitamin 1 Tablet(s) Oral daily  pantoprazole  Injectable 40 milliGRAM(s) IV Push every 12 hours  piperacillin/tazobactam IVPB. 3.375 Gram(s) IV Intermittent every 8 hours  potassium chloride   Powder 40 milliEquivalent(s) Oral every 4 hours  sodium chloride 0.9%. 500 milliLiter(s) (100 mL/Hr) IV Continuous <Continuous>  vancomycin  IVPB 1250 milliGRAM(s) IV Intermittent every 24 hours    MEDICATIONS  (PRN):  acetaminophen   Tablet. 650 milliGRAM(s) Oral every 6 hours PRN Mild Pain (1 - 3)  lidocaine 5% Ointment 1 Application(s) Topical two times a day PRN neuropathic pain  oxyCODONE    5 mG/acetaminophen 325 mG 2 Tablet(s) Oral every 6 hours PRN Severe Pain (7 - 10)  traMADol 50 milliGRAM(s) Oral every 8 hours PRN Moderate Pain (4 - 6)      FAMILY HISTORY:  Family history of aneurysm          ROS:  CONSTITUTIONAL: No fever, weight loss, or fatigue  EYES: No eye pain, visual disturbances, or discharge  ENMT:  No difficulty hearing, tinnitus, vertigo; No sinus or throat pain  NECK: No pain or stiffness  BREASTS: No pain, masses, or nipple discharge  RESPIRATORY: No cough, wheezing, chills or hemoptysis; No shortness of breath  CARDIOVASCULAR: No chest pain, palpitations, dizziness, or leg swelling  GASTROINTESTINAL: No abdominal or epigastric pain. No nausea, vomiting, or hematemesis; No diarrhea or constipation. No melena or hematochezia.  GENITOURINARY: No dysuria, frequency, hematuria, or incontinence  NEUROLOGICAL: No headaches, memory loss, loss of strength, numbness, or tremors  SKIN: No itching, burning, rashes, or lesions   LYMPH NODES: No enlarged glands  ENDOCRINE: No heat or cold intolerance; No hair loss  MUSCULOSKELETAL: No joint pain or swelling; No muscle, back, or extremity pain  PSYCHIATRIC: No depression, anxiety, mood swings, or difficulty sleeping  HEME/LYMPH: No easy bruising, or bleeding gums  ALLERGY AND IMMUNOLOGIC: No hives or eczema    PHYSICAL EXAM-      Vital Signs Last 24 Hrs  T(C): 36.6 (07 Nov 2017 04:58), Max: 37 (06 Nov 2017 14:30)  T(F): 97.8 (07 Nov 2017 04:58), Max: 98.6 (06 Nov 2017 14:30)  HR: 68 (07 Nov 2017 11:01) (66 - 95)  BP: 161/79 (07 Nov 2017 11:01) (130/80 - 172/84)  BP(mean): --  RR: 16 (07 Nov 2017 04:58) (14 - 16)  SpO2: 96% (07 Nov 2017 11:01) (95% - 98%)    Constitutional: well developed, well nourished, no apparent distress, alert, oriented x 3,   Neck: Supple   Pulmonary: no respiratory distress, normal respiratory rhythm and effort, lungs are clear to auscultation/percussion. No CVA tenderness.  Cardiovascular: heart rate normal, normal sinus rhythm; no murmurs, gallops, rubs, heaves or thrills   Abdomen: soft, non-tender, +BS, no guarding/rebound/rigidity.  Vascular: Right lower extremity is cool, no palpable or doppler signal, gangrene of mid forefoot, no acute infection, S/P multiple bypass in her right lower extremity over past few years.   Extremities: Chronic arterial insufficiency changes  Skin: Gangrene of mid right fore foot                              10.0   12.3  )-----------( 277      ( 07 Nov 2017 08:18 )             32.4     11-07    143  |  109<H>  |  12  ----------------------------<  95  3.1<L>   |  24  |  0.96    Ca    7.8<L>      07 Nov 2017 08:18    TPro  5.9<L>  /  Alb  2.3<L>  /  TBili  0.5  /  DBili  x   /  AST  8<L>  /  ALT  16  /  AlkPhos  68  11-07      Radiology: Angiogram reveals no named vessel below the knee, occluded popliteal.

## 2017-11-07 NOTE — CONSULT NOTE ADULT - CONSULT REASON
Anemia - D64.89
Infected right 2nd toe with possible OM
Right 2nd digit cellulitis
a fib on warfarin w/ symptomatic anemia
anemia
ischemic right foot with gangrene of right 2nd. toe
cholelithiasis, s/p ERCP for stone extraction and placement of CBD stent
Second opinion

## 2017-11-07 NOTE — SWALLOW BEDSIDE ASSESSMENT ADULT - COMMENTS
82F PMHx Afib on Coumadin, HLD, CVA, MVP, OA, HTN, PVD (s/p RLE bypassx3, most recently 2012), Neuropathy, GERD presents with pain and swelling to right second toe that started 1 month ago.   Pt c no overt si/sx of aspiration

## 2017-11-07 NOTE — PHYSICAL THERAPY INITIAL EVALUATION ADULT - RANGE OF MOTION EXAMINATION, REHAB EVAL
bilateral lower extremity ROM was WFL (within functional limits)/bilateral upper extremity ROM was WFL (within functional limits)/B arthritic fingers 1-5

## 2017-11-07 NOTE — PROGRESS NOTE ADULT - SUBJECTIVE AND OBJECTIVE BOX
Patient is a 82y old  Female who presents with a chief complaint of weakness and pain both legs (28 Oct 2017 20:23)      HPI:  Patient had no overnight events. Patient was seen today morning at the bedside and offers no complaints. she tolerated yesterdays endoscopy without issue. Tolerating diet.      INTERVAL HPI/OVERNIGHT EVENTS:  T(C): 36.6 (11-07-17 @ 04:58), Max: 37 (11-06-17 @ 14:30)  HR: 68 (11-07-17 @ 11:01) (66 - 95)  BP: 161/79 (11-07-17 @ 11:01) (130/80 - 172/84)  RR: 16 (11-07-17 @ 04:58) (14 - 16)  SpO2: 96% (11-07-17 @ 11:01) (95% - 98%)  Wt(kg): --  I&O's Summary    06 Nov 2017 07:01  -  07 Nov 2017 07:00  --------------------------------------------------------  IN: 1850 mL / OUT: 0 mL / NET: 1850 mL        REVIEW OF SYSTEMS:  CONSTITUTIONAL: denies fever, chills, fatigue, weakness  HEENT: denies blurred vision, sore throat  SKIN: denies new lesions, rash  CARDIOVASCULAR: denies chest pain, chest pressure, palpitations  RESPIRATORY: denies shortness of breath, sputum production  GASTROINTESTINAL: denies nausea, vomiting, diarrhea, abdominal pain, dyschezia  GENITOURINARY: denies dysuria, discharge  NEUROLOGICAL: denies numbness, headache, focal weakness  MUSCULOSKELETAL: denies new joint pain, muscle aches  PSYCH: denies anxiety depression at this time    PHYSICAL EXAM:  GENERAL: NAD,   HEAD:  Atraumatic, Normocephalic  EYES: EOMI, fixed dilated pupil on the left  NERVOUS SYSTEM:  Alert & Oriented X3, poor concentration; Motor Strength 5/5 B/L upper and lower extremities;   CHEST/LUNG: Clear to percussion bilaterally; No rales, rhonchi, wheezing, or rubs  HEART: irregular rate and rhythm; No murmurs, rubs, or gallops  ABDOMEN: Soft, Nontender, Nondistended; Bowel sounds present  EXTREMITIES:  trace Peripheral Pulses in bilateral lower extremities, b/l feet are warm, tender to palpation on the right foot at the toe    SKIN: right foot, 2nd toe lesion blackened with necrotic appearance at tip, with blueish discoloration, trace erythema distal to surgical pen marking markedly improved.        MEDICATIONS  (STANDING):  atorvastatin 40 milliGRAM(s) Oral at bedtime  diltiazem    Tablet 60 milliGRAM(s) Oral every 6 hours  gabapentin 100 milliGRAM(s) Oral three times a day  losartan 100 milliGRAM(s) Oral daily  metoprolol     tartrate 100 milliGRAM(s) Oral two times a day  multivitamin 1 Tablet(s) Oral daily  pantoprazole  Injectable 40 milliGRAM(s) IV Push every 12 hours  piperacillin/tazobactam IVPB. 3.375 Gram(s) IV Intermittent every 8 hours  potassium chloride   Powder 40 milliEquivalent(s) Oral every 4 hours  sodium chloride 0.9%. 500 milliLiter(s) (100 mL/Hr) IV Continuous <Continuous>  vancomycin  IVPB 1250 milliGRAM(s) IV Intermittent every 24 hours    MEDICATIONS  (PRN):  acetaminophen   Tablet. 650 milliGRAM(s) Oral every 6 hours PRN Mild Pain (1 - 3)  lidocaine 5% Ointment 1 Application(s) Topical two times a day PRN neuropathic pain  oxyCODONE    5 mG/acetaminophen 325 mG 2 Tablet(s) Oral every 6 hours PRN Severe Pain (7 - 10)  traMADol 50 milliGRAM(s) Oral every 8 hours PRN Moderate Pain (4 - 6)      LABS:                        10.0   12.3  )-----------( 277      ( 07 Nov 2017 08:18 )             32.4     11-07    143  |  109<H>  |  12  ----------------------------<  95  3.1<L>   |  24  |  0.96    Ca    7.8<L>      07 Nov 2017 08:18    TPro  5.9<L>  /  Alb  2.3<L>  /  TBili  0.5  /  DBili  x   /  AST  8<L>  /  ALT  16  /  AlkPhos  68  11-07

## 2017-11-07 NOTE — PROGRESS NOTE ADULT - PROBLEM SELECTOR PLAN 3
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 continue to monitor H/h  continue to transfuse if hb< 7  continue protonix 40 mg BID IV  hold all AC for now  s/p endoscopy showing gastritis, hiatal hernia, with biopsy of the antrum and the body. Tortuous esophagus

## 2017-11-07 NOTE — PROGRESS NOTE ADULT - PROBLEM SELECTOR PLAN 2
s/p ERCP w/ stone extraction, tolerated well  trend CMP  asymptomatic at present  cholecystectomy deferred at this time -- re evaluate if patient symptomatic

## 2017-11-07 NOTE — CONSULT NOTE ADULT - CONSULT REQUESTED DATE/TIME
29-Oct-2017
30-Oct-2017
30-Oct-2017 09:10
30-Oct-2017 10:59
30-Oct-2017 14:01
30-Oct-2017 16:19
04-Nov-2017
07-Nov-2017 12:40

## 2017-11-07 NOTE — PROGRESS NOTE ADULT - ASSESSMENT
82F PMHx Afib on Coumadin, HLD, CVA, MVP, OA, HTN, PVD (s/p RLE bypassx3, most recently 2012), Neuropathy, GERD presents with pain and swelling to right second toe a/w RLE cellulitis, r/o osteomyelitis. Found to have symptomatic anemia     -Patient converted back to SR, continue Cardizem 60mg q6 and metoprolol 100 BID. HR and BP acceptable.  -No evidence of acute ischemia or meaningful volume overload  -Plan to resume AC once cleared by GI  -Extensive history of PVD and stenotic valvular disease. Now with gangrene. Vascular recs appreciated. F/u ID recs  -Patient remains undecided about amputation  -TTE (Nov 4/17): Technically limited study. normal left ventricular size and systolic function, estimated LVEF of 65%. Grade 1 diastolic dysfunction. Normal biatrial size. The mitral valve is structurally normal, 1+ MR.  -Monitor electrolytes, keep K>4, Mg>2  -Will continue to follow

## 2017-11-07 NOTE — PROGRESS NOTE ADULT - SUBJECTIVE AND OBJECTIVE BOX
81 yo Female with PMH of HLD, CVA, MVP, OA, HTN, PVD, Neuropathy, GERD with chief complaint of Right 2nd digit cellulitis. Pt admits throbbing pain last night to right 2nd digit. Pt denies N/V/F/C at this time.    H&P from ED: "82F PMHx Afib on Coumadin, HLD, CVA, MVP, OA, HTN, PVD (s/p RLE bypassx3, most recently 2012), Neuropathy, GERD presents with pain and swelling to right second toe that started 1 month ago. Patient states foot became red, painful, and a small wound with a scab started forming on the top portion of her second toe. It became extremely difficult to bear weight on the foot so she went to see Dr. Isaacs at the wound center and he drained the wound at which point, yellow pus drained from the wound. He prescribed her an antibiotic (she does not recall which one). She denies any fevers, chills, chesst pain or shortness of breat. Admits to joint pain due to her RA."    Vital Signs Last 24 Hrs  T(C): 36.6 (07 Nov 2017 04:58), Max: 37 (06 Nov 2017 14:30)  T(F): 97.8 (07 Nov 2017 04:58), Max: 98.6 (06 Nov 2017 14:30)  HR: 68 (07 Nov 2017 11:01) (66 - 95)  BP: 161/79 (07 Nov 2017 11:01) (130/80 - 172/84)  BP(mean): --  RR: 16 (07 Nov 2017 04:58) (14 - 16)  SpO2: 96% (07 Nov 2017 11:01) (95% - 98%)    Phys Exam:   Derm: dry scabe noted to right dorsal 2nd PIPJ with underlying ulcer that is probe to bone, no malodor, nails 1-10 wnl. no other open lesion. skin is thin, atrophic and shiny.   Neuro: Gross prot sensation intact bilat  MSK: mm strength testing 5/5 all quadrants bilat, pain on palpation to right 2nd PIPJ aspect.   Vasc: DP, PT non palpable, CFT< 4 sec to all digits, absent pedal hair bilat, edematous Right 2nd digit                          10.0   12.3  )-----------( 277      ( 07 Nov 2017 08:18 )             32.4   11-07    143  |  109<H>  |  12  ----------------------------<  95  3.1<L>   |  24  |  0.96    Ca    7.8<L>      07 Nov 2017 08:18    TPro  5.9<L>  /  Alb  2.3<L>  /  TBili  0.5  /  DBili  x   /  AST  8<L>  /  ALT  16  /  AlkPhos  68  11-07

## 2017-11-07 NOTE — PROGRESS NOTE ADULT - PROBLEM SELECTOR PLAN 7
CT angiography showing multiple sites of occlusion in b/l lower extremities  IR angiogram showing Multilevel occlusions involving the right common femoral   artery, the superficial femoral artery with collateral supplying vessel under the knee  As per Vascular surgery (Dr. Sánchez) and cardiology (Dr. Bañuelos group) chance of success at a 4th bypass is very small given her prior failed grafts and she is at high cardiac risk for the procedure. Plan to discuss options, including AKA with patients vascular (Dr. Praham)

## 2017-11-07 NOTE — PROGRESS NOTE ADULT - PROBLEM SELECTOR PLAN 5
likely source of abdominal pain - resolved s/p smog enema  having diarrhea will hold all laxatives for now

## 2017-11-08 LAB
ALBUMIN SERPL ELPH-MCNC: 2.2 G/DL — LOW (ref 3.3–5)
ALP SERPL-CCNC: 60 U/L — SIGNIFICANT CHANGE UP (ref 40–120)
ALT FLD-CCNC: 15 U/L — SIGNIFICANT CHANGE UP (ref 12–78)
ANION GAP SERPL CALC-SCNC: 9 MMOL/L — SIGNIFICANT CHANGE UP (ref 5–17)
AST SERPL-CCNC: 10 U/L — LOW (ref 15–37)
BILIRUB SERPL-MCNC: 0.4 MG/DL — SIGNIFICANT CHANGE UP (ref 0.2–1.2)
BUN SERPL-MCNC: 13 MG/DL — SIGNIFICANT CHANGE UP (ref 7–23)
CALCIUM SERPL-MCNC: 8.2 MG/DL — LOW (ref 8.5–10.1)
CHLORIDE SERPL-SCNC: 112 MMOL/L — HIGH (ref 96–108)
CO2 SERPL-SCNC: 22 MMOL/L — SIGNIFICANT CHANGE UP (ref 22–31)
CREAT SERPL-MCNC: 1 MG/DL — SIGNIFICANT CHANGE UP (ref 0.5–1.3)
GLUCOSE SERPL-MCNC: 98 MG/DL — SIGNIFICANT CHANGE UP (ref 70–99)
HCT VFR BLD CALC: 29.6 % — LOW (ref 34.5–45)
HGB BLD-MCNC: 9.1 G/DL — LOW (ref 11.5–15.5)
MCHC RBC-ENTMCNC: 28.8 PG — SIGNIFICANT CHANGE UP (ref 27–34)
MCHC RBC-ENTMCNC: 30.8 GM/DL — LOW (ref 32–36)
MCV RBC AUTO: 93.4 FL — SIGNIFICANT CHANGE UP (ref 80–100)
PLATELET # BLD AUTO: 281 K/UL — SIGNIFICANT CHANGE UP (ref 150–400)
POTASSIUM SERPL-MCNC: 3.5 MMOL/L — SIGNIFICANT CHANGE UP (ref 3.5–5.3)
POTASSIUM SERPL-SCNC: 3.5 MMOL/L — SIGNIFICANT CHANGE UP (ref 3.5–5.3)
PROT SERPL-MCNC: 5.7 G/DL — LOW (ref 6–8.3)
RBC # BLD: 3.16 M/UL — LOW (ref 3.8–5.2)
RBC # FLD: 14.7 % — HIGH (ref 10.3–14.5)
SODIUM SERPL-SCNC: 143 MMOL/L — SIGNIFICANT CHANGE UP (ref 135–145)
WBC # BLD: 12.4 K/UL — HIGH (ref 3.8–10.5)
WBC # FLD AUTO: 12.4 K/UL — HIGH (ref 3.8–10.5)

## 2017-11-08 PROCEDURE — 99233 SBSQ HOSP IP/OBS HIGH 50: CPT

## 2017-11-08 RX ORDER — ENOXAPARIN SODIUM 100 MG/ML
40 INJECTION SUBCUTANEOUS DAILY
Qty: 0 | Refills: 0 | Status: DISCONTINUED | OUTPATIENT
Start: 2017-11-08 | End: 2017-11-08

## 2017-11-08 RX ORDER — DOCUSATE SODIUM 100 MG
100 CAPSULE ORAL THREE TIMES A DAY
Qty: 0 | Refills: 0 | Status: DISCONTINUED | OUTPATIENT
Start: 2017-11-08 | End: 2017-11-09

## 2017-11-08 RX ADMIN — ATORVASTATIN CALCIUM 40 MILLIGRAM(S): 80 TABLET, FILM COATED ORAL at 22:53

## 2017-11-08 RX ADMIN — Medication 250 MILLIGRAM(S): at 18:40

## 2017-11-08 RX ADMIN — Medication 100 MILLIGRAM(S): at 06:17

## 2017-11-08 RX ADMIN — PIPERACILLIN AND TAZOBACTAM 25 GRAM(S): 4; .5 INJECTION, POWDER, LYOPHILIZED, FOR SOLUTION INTRAVENOUS at 06:17

## 2017-11-08 RX ADMIN — Medication 100 MILLIGRAM(S): at 18:42

## 2017-11-08 RX ADMIN — Medication 100 MILLIGRAM(S): at 14:18

## 2017-11-08 RX ADMIN — PIPERACILLIN AND TAZOBACTAM 25 GRAM(S): 4; .5 INJECTION, POWDER, LYOPHILIZED, FOR SOLUTION INTRAVENOUS at 14:18

## 2017-11-08 RX ADMIN — OXYCODONE AND ACETAMINOPHEN 2 TABLET(S): 5; 325 TABLET ORAL at 21:10

## 2017-11-08 RX ADMIN — GABAPENTIN 100 MILLIGRAM(S): 400 CAPSULE ORAL at 06:17

## 2017-11-08 RX ADMIN — PIPERACILLIN AND TAZOBACTAM 25 GRAM(S): 4; .5 INJECTION, POWDER, LYOPHILIZED, FOR SOLUTION INTRAVENOUS at 22:53

## 2017-11-08 RX ADMIN — Medication 100 MILLIGRAM(S): at 22:53

## 2017-11-08 RX ADMIN — Medication 1 TABLET(S): at 11:53

## 2017-11-08 RX ADMIN — OXYCODONE AND ACETAMINOPHEN 2 TABLET(S): 5; 325 TABLET ORAL at 06:17

## 2017-11-08 RX ADMIN — OXYCODONE AND ACETAMINOPHEN 2 TABLET(S): 5; 325 TABLET ORAL at 20:40

## 2017-11-08 RX ADMIN — OXYCODONE AND ACETAMINOPHEN 2 TABLET(S): 5; 325 TABLET ORAL at 06:47

## 2017-11-08 RX ADMIN — LOSARTAN POTASSIUM 100 MILLIGRAM(S): 100 TABLET, FILM COATED ORAL at 06:17

## 2017-11-08 RX ADMIN — GABAPENTIN 100 MILLIGRAM(S): 400 CAPSULE ORAL at 14:18

## 2017-11-08 RX ADMIN — ENOXAPARIN SODIUM 40 MILLIGRAM(S): 100 INJECTION SUBCUTANEOUS at 11:53

## 2017-11-08 RX ADMIN — PANTOPRAZOLE SODIUM 40 MILLIGRAM(S): 20 TABLET, DELAYED RELEASE ORAL at 06:16

## 2017-11-08 RX ADMIN — PANTOPRAZOLE SODIUM 40 MILLIGRAM(S): 20 TABLET, DELAYED RELEASE ORAL at 18:39

## 2017-11-08 RX ADMIN — GABAPENTIN 100 MILLIGRAM(S): 400 CAPSULE ORAL at 22:53

## 2017-11-08 NOTE — PROGRESS NOTE ADULT - PROBLEM SELECTOR PLAN 4
patient currently in normal sinus rhythms  continue cardizem and metoprolol with parameters  maintain HR < 110  Anticoagulation held due to anemia and evaluation for GIB. Re-evaluate after amputation  continue to monitor on Tele  f/u cardiology (Dr. De Los Santos)

## 2017-11-08 NOTE — PROGRESS NOTE ADULT - SUBJECTIVE AND OBJECTIVE BOX
INTERVAL HPI/OVERNIGHT EVENTS:    denies abd pain, n/v  (+) BMs - no blood in stools    HPI:  82F PMHx Afib on Coumadin, HLD, CVA, MVP, OA, HTN, PVD (s/p RLE bypassx3, most recently ), Neuropathy, GERD presents with pain and swelling to right second toe that started 1 month ago. Patient states foot became red, painful, and a small wound with a scab started forming on the top portion of her second toe. It became extremely difficult to bear weight on the foot so she went to see Dr. Isaacs at the wound center and he drained the wound at which point, yellow pus drained from the wound. He prescribed her an antibiotic (she does not recall which one). She denies any fevers, chills, chesst pain or shortness of breat. Admits to joint pain due to her RA.     In ED, pt's vital was wnl. WBC 8.2, Hct: 26.1, lactate 0.8, , CMP revealed elevated bilirubin of 1.5, remainder is wnl , INR: 2.26. CXR showed no acute pathology. X ray of Rt foot showed possible small periarticular erosion medial aspect head of the proximal phalanx which might represent infection/ osteomyelitis. Pt received 1 dose  of  vanco and 1 dose of zosyn and 1 bolus of NS, BCX, UCX was sent in  ED. (28 Oct 2017 13:42)    MEDICATIONS  (STANDING):  atorvastatin 40 milliGRAM(s) Oral at bedtime  diltiazem    Tablet 60 milliGRAM(s) Oral every 6 hours  docusate sodium 100 milliGRAM(s) Oral three times a day  enoxaparin Injectable 40 milliGRAM(s) SubCutaneous daily  gabapentin 100 milliGRAM(s) Oral three times a day  losartan 100 milliGRAM(s) Oral daily  metoprolol     tartrate 100 milliGRAM(s) Oral two times a day  multivitamin 1 Tablet(s) Oral daily  pantoprazole  Injectable 40 milliGRAM(s) IV Push every 12 hours  piperacillin/tazobactam IVPB. 3.375 Gram(s) IV Intermittent every 8 hours  sodium chloride 0.9%. 500 milliLiter(s) (100 mL/Hr) IV Continuous <Continuous>  vancomycin  IVPB        MEDICATIONS  (PRN):  acetaminophen   Tablet. 650 milliGRAM(s) Oral every 6 hours PRN Mild Pain (1 - 3)  lidocaine 5% Ointment 1 Application(s) Topical two times a day PRN neuropathic pain  oxyCODONE    5 mG/acetaminophen 325 mG 2 Tablet(s) Oral every 6 hours PRN Severe Pain (7 - 10)  oxyCODONE    5 mG/acetaminophen 325 mG 1 Tablet(s) Oral every 6 hours PRN Moderate Pain (4 - 6)      Allergies    No Known Allergies    Intolerances          General:  No wt loss, fevers, chills, night sweats, fatigue,   Eyes:  Good vision, no reported pain  ENT:  No sore throat, pain, runny nose, dysphagia  CV:  No pain, palpitations, hypo/hypertension  Resp:  No dyspnea, cough, tachypnea, wheezing  GI:  No pain, No nausea, No vomiting, No diarrhea, No constipation, No weight loss, No fever, No pruritis, No rectal bleeding, No tarry stools, No dysphagia,  :  No pain, bleeding, incontinence, nocturia  Muscle:  (+) toe pain  Neuro:  No weakness, tingling, memory problems  Psych:  No fatigue, insomnia, mood problems, depression  Endocrine:  No polyuria, polydipsia, cold/heat intolerance  Heme:  No petechiae, ecchymosis, easy bruisability  Skin:  No rash, tattoos, scars, edema      PHYSICAL EXAM:   Vital Signs:  Vital Signs Last 24 Hrs  T(C): 36.8 (2017 14:08), Max: 36.8 (2017 14:08)  T(F): 98.2 (2017 14:08), Max: 98.2 (2017 14:08)  HR: 72 (2017 14:08) (66 - 74)  BP: 115/70 (2017 14:08) (112/66 - 156/79)  BP(mean): --  RR: 16 (2017 14:08) (16 - 17)  SpO2: 96% (2017 14:08) (95% - 96%)  Daily     Daily Weight in k (2017 04:59)I&O's Summary    2017 07:01  -  2017 07:00  --------------------------------------------------------  IN: 1200 mL / OUT: 250 mL / NET: 950 mL    2017 07:01  -  2017 14:22  --------------------------------------------------------  IN: 360 mL / OUT: 0 mL / NET: 360 mL        GENERAL:  Appears stated age, well-groomed, well-nourished, no distress  HEENT:  NC/AT,  conjunctivae clear and pink, no thyromegaly, nodules, adenopathy, no JVD, sclera -anicteric  CHEST:  Full & symmetric excursion, no increased effort, breath sounds clear  HEART:  Regular rhythm, S1, S2, no murmur/rub/S3/S4, no abdominal bruit, no edema  ABDOMEN:  Soft, non-tender, non-distended, normoactive bowel sounds,  no masses ,no hepato-splenomegaly, no signs of chronic liver disease  EXTEREMITIES:  right toe tenderness  SKIN:  No rash/erythema/ecchymoses/petechiae/wounds/abscess/warm/dry  NEURO:  Alert, oriented, no asterixis, no tremor, no encephalopathy      LABS:                        9.1    12.4  )-----------( 281      ( 2017 08:04 )             29.6     11-08    143  |  112<H>  |  13  ----------------------------<  98  3.5   |  22  |  1.00    Ca    8.2<L>      2017 08:04    TPro  5.7<L>  /  Alb  2.2<L>  /  TBili  0.4  /  DBili  x   /  AST  10<L>  /  ALT  15  /  AlkPhos  60  11-08        amylase   lipase  RADIOLOGY & ADDITIONAL TESTS:

## 2017-11-08 NOTE — PROGRESS NOTE ADULT - SUBJECTIVE AND OBJECTIVE BOX
HPI:  82F PMHx Afib on Coumadin, HLD, CVA, MVP, OA, HTN, PVD (s/p RLE bypassx3, most recently ), Neuropathy, GERD presents with pain and swelling to right second toe that started 1 month ago. Patient states foot became red, painful, and a small wound with a scab started forming on the top portion of her second toe. It became extremely difficult to bear weight on the foot so she went to see Dr. Isaacs at the wound center and he drained the wound at which point, yellow pus drained from the wound. He prescribed her an antibiotic (she does not recall which one). She denies any fevers, chills, chesst pain or shortness of breat. Admits to joint pain due to her RA.     In ED, pt's vital was wnl. WBC 8.2, Hct: 26.1, lactate 0.8, , CMP revealed elevated bilirubin of 1.5, remainder is wnl , INR: 2.26. CXR showed no acute pathology. X ray of Rt foot showed possible small periarticular erosion medial aspect head of the proximal phalanx which might represent infection/ osteomyelitis. Pt received 1 dose  of  vanco and 1 dose of zosyn and 1 bolus of NS, BCX, UCX was sent in  ED. (28 Oct 2017 13:42)    SUBJECTIVE:  Patient is a 82y old  Female who presents with a chief complaint of weakness and pain both legs (28 Oct 2017 20:23)    Awake and alert, denies CP, palpitations, SOB, or orthopnea.  Comfortable on RA.  Denies nausea, vomiting, or abdominal pain.  However, c/o extreme pain on right foot.    OBJECTIVE:  Review Of Systems:  Constitutional: [ ] Fever [ ] Chills [ ] Fatigue [ ] Weight change   HEENT: [ ] Blurred vision [ ] Eye Pain [ ] Headache [ ] Runny nose [ ] Sore Throat   Respiratory: [ ] Cough [ ] Wheezing [ ] Shortness of breath  Cardiovascular: [ ] Chest Pain [ ] Palpitations [ ] CRABTREE [ ] PND [ ] Orthopnea  Gastrointestinal: [ ] Abdominal Pain [ ] Diarrhea [ ] Constipation [ ] Hemorrhoids [ ] Nausea [ ] Vomiting  Genitourinary: [ ] Nocturia [ ] Dysuria [ ] Incontinence  Extremities: [ ] Swelling [ ] Joint Pain  Neurologic: [ ] Focal deficit [ ] Paresthesias [ ] Syncope  Lymphatic: [ ] Swelling [ ] Lymphadenopathy   Skin: [ ] Rash [ ] Ecchymoses [ ] Wounds [ ] Lesions  Psychiatry: [ ] Depression [ ] Suicidal/Homicidal Ideation [ ] Anxiety [ ] Sleep Disturbances  [x ] 10 point review of systems is otherwise negative except as mentioned above            [ ]Unable to obtain    Allergy:  Allergies    No Known Allergies    Intolerances    Medications:  MEDICATIONS  (STANDING):  atorvastatin 40 milliGRAM(s) Oral at bedtime  diltiazem    Tablet 60 milliGRAM(s) Oral every 6 hours  gabapentin 100 milliGRAM(s) Oral three times a day  losartan 100 milliGRAM(s) Oral daily  metoprolol     tartrate 100 milliGRAM(s) Oral two times a day  multivitamin 1 Tablet(s) Oral daily  pantoprazole  Injectable 40 milliGRAM(s) IV Push every 12 hours  piperacillin/tazobactam IVPB. 3.375 Gram(s) IV Intermittent every 8 hours  sodium chloride 0.9%. 500 milliLiter(s) (100 mL/Hr) IV Continuous <Continuous>  vancomycin  IVPB        MEDICATIONS  (PRN):  acetaminophen   Tablet. 650 milliGRAM(s) Oral every 6 hours PRN Mild Pain (1 - 3)  lidocaine 5% Ointment 1 Application(s) Topical two times a day PRN neuropathic pain  oxyCODONE    5 mG/acetaminophen 325 mG 2 Tablet(s) Oral every 6 hours PRN Severe Pain (7 - 10)  oxyCODONE    5 mG/acetaminophen 325 mG 1 Tablet(s) Oral every 6 hours PRN Moderate Pain (4 - 6)    PMH/PSH/FH/SH: [ ] Unchanged    Vitals:  T(C): 36.5 (17 @ 04:59), Max: 36.7 (17 @ 01:12)  HR: 74 (17 @ 04:59) (66 - 74)  BP: 149/54 (17 @ 04:59) (138/69 - 161/79)  BP(mean): --  RR: 16 (17 @ 04:59) (16 - 18)  SpO2: 95% (17 @ 04:59) (95% - 97%)  Wt(kg): --  Daily     Daily Weight in k (2017 04:59)  I&O's Summary    2017 07:01  -  2017 07:00  --------------------------------------------------------  IN: 1200 mL / OUT: 250 mL / NET: 950 mL    Labs:                        10.0   12.3  )-----------( 277      ( 2017 08:18 )             32.4     1107    143  |  109<H>  |  12  ----------------------------<  95  3.1<L>   |  24  |  0.96    Ca    7.8<L>      2017 08:18    TPro  5.9<L>  /  Alb  2.3<L>  /  TBili  0.5  /  DBili  x   /  AST  8<L>  /  ALT  16  /  AlkPhos  68  07      ECG:  < from: 12 Lead ECG (10.28.17 @ 13:32) >    Ventricular Rate 81 BPM    Atrial Rate 81 BPM    P-R Interval 172 ms    QRS Duration 96 ms    Q-T Interval 416 ms    QTC Calculation(Bezet) 483 ms    P Axis 16 degrees    R Axis -34 degrees    T Axis 8 degrees    Diagnosis Line Normal sinus rhythm  Left axis deviation  Abnormal ECG  When compared with ECG of 2017 11:52,  No significant change was found  Confirmed by Evens GILBERT, Campbell ([id number]) on 10/29/2017 1:49:49 PM    < end of copied text >    Echo:  < from: TTE Echo Doppler w/o Cont (17 @ 08:47) >     EXAM:  ECHO TTE W/O CON COMP W/DOPPLR         PROCEDURE DATE:  2017        INTERPRETATION:  INDICATION: aortic valve disease    Blood Pressure 167/83    Height 152     Weight 51       BSA 1.5    Dimensions:    LA 3.2       Normal Values: 2.0- 4.0 cm    Ao 3.0        Normal Values: 2.0 - 3.8 cm  SEPTUM 1.0       Normal Values: 0.6 - 1.2 cm  PWT 1.0       Normal Values: 0.6 - 1.1 cm  LVIDd 3.5         Normal Values: 3.0 - 5.6 cm  LVIDs 2.6         Normal Values: 1.8 - 4.0 cm    Derived Variables:  LVMI     g/m2  RWT      Fractional Short      Ejection Fraction 65    Doppler Peak v. AoV=   (m/sec)    OBSERVATIONS:    Mitral Valve: normal, 1+ MR.  Aortic Valve/Aorta: Calcified trileaflet aortic valve. Minimal AI  Tricuspid Valve: normalwith trace TR.  Pulmonic Valve: normal  Left Atrium: normal  Right Atrium: normal  Left Ventricle: Technically limited study. As visualized, there is normal   LV size and systolic function, estimated LVEF of 65%.  Right Ventricle: Not well visualized, probably normal  Pericardium/Pleura: no significant pleural effusion, no significant   pericardial effusion.  Pulmonary/RV Pressure: Inadequate TR jet to estimate PA systolic pressure.  LV Diastolic Function: Grade 1 diastolic dysfunction.     Technically limited study. As visualized, there is normal left   ventricular size and systolic function, estimated LVEF of 65%. The right   ventricle is not well seen, but is probably normal in size with normal   systolic function. Grade 1 diastolic dysfunction. Normal biatrial size.   The aortic root is normal in size. The mitral valve is structurally   normal, 1+ MR. The aortic valve is calcified without stenosis. There is   minimal aortic insufficiency. Trace physiologic TR. Inadequate TR jet to   estimate PA systolic pressure. No significant pericardial effusion.      FREDI DALAL M.D., ATTENDING CARDIOLOGIST  This document has been electronically signed. 2017  5:20PM      < end of copied text >    Stress Testing:     Cath:    Imaging:    EXAM:  CHEST 1 VIEW                          PROCEDURE DATE:  10/28/2017      INTERPRETATION:  Chest portable.    Clinical History: Preoperative, right foot infection.    Comparison: 2017.    Single AP view submitted.    The evaluation of the cardiomediastinal silhouette is limited on portable   technique.  There is arteriosclerotic    There is a possible hiatal hernia. Calcification of the aorta.    No focal consolidation, effusion or pneumothorax is noted.    Impression:    Findings as discussed above.                      FLAVIO BARAHONA M.D., ATTENDING RADIOLOGIST  This document has been electronically signed. Oct 28 2017  1:28PM                < end of copied text >    Interpretation of Telemetry:      Physical Exam:  Appearance: [ ] Normal  [ ] abnormal [ x] NAD   Eyes: [x ] PERRL [ ] EOMI  HENT: [ x] Normal [ ] Abnormal oral muscosa [ ]NC/AT  Cardiovascular: [x ] S1 [ x] S2 [x ] RRR [ ] m/r/g [ ]edema [ ] JVP  Procedural Access Site: [ ]  hematoma [ ] tender to palpation [ ] 2+ pulse [ ] bruit [ ] Ecchymosis  Respiratory: [x ] Clear to auscultation bilaterally  Gastrointestinal: [x ] Soft [ ] tenderness[ ] distension [x ] BS  Musculoskeletal: [ ] clubbing [ ] joint deformity [c] right toes with gangrenous ulcer  Neurologic: [x ] Non-focal  Lymphatic: [ ] lymphadenopathy  Psychiatry: [x ] AAOx3  [ ] confused [ ] disoriented [x ] Mood & affect appropriate  Skin: [ ]  rashes [ ] ecchymoses [ ] cyanosis

## 2017-11-08 NOTE — PROGRESS NOTE ADULT - SUBJECTIVE AND OBJECTIVE BOX
CARITO CONSTANTINO is a 82yFemale , patient examined and chart reviewed. Patient seen and examined today being followed for right  2nd gangrenous toe       INTERVAL HPI/ OVERNIGHT EVENTS: Events noted, she will be scheduled for amputation of the right 2nd toe by podiatry as she has increased pain and drainage now . 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  Osteoarthritis  Afib  PVD (peripheral vascular disease)  Neuropathy: (Right lower leg)  H/O cerebral aneurysm repair: brain clips  CVA (cerebral vascular accident): (&quot;Mini-stroke&quot;,1990&#x27;s)  Rheumatoid arthritis  Hyperlipidemia  Hypertension  S/P cataract surgery: (Left eye)  Elective surgery: (&quot;Twisted bowel&quot;, 2014)  Elective surgery: (Exision of cyst on liver, 1985)  S/P ORIF (open reduction internal fixation) fracture: Left hip fx (2012) &amp; R hip fx (2013)  H/O cerebral aneurysm repair: Brain clips (1978(  Renal stone: Cysto stent placement 10/1/2014  PVD (peripheral vascular disease): s/p RLE bypass x 3, most recent 3/2012 Right external iliac to PT bypass w/ PTFE (2012)  S/P FRED (total abdominal hysterectomy): (1987, Hx of &quot;ovarian cancer?&quot;)      For details regarding the patient's social history, family history, and other miscellaneous elements, please refer the initial infectious diseases consultation and/or the admitting history and physical examination for this admission.    ROS:  CONSTITUTIONAL:  Negative fever or chills, feels well, good appetite  EYES:  Negative  blurry vision or double vision  CARDIOVASCULAR:  Negative for chest pain or palpitations  RESPIRATORY:  Negative for cough, wheezing, or SOB   GASTROINTESTINAL:  Negative for nausea, vomiting, diarrhea, constipation, or abdominal pain  GENITOURINARY:  Negative frequency, urgency or dysuria  NEUROLOGIC:  No headache, confusion, dizziness, lightheadedness  All other systems were reviewed and are negative         Current inpatient medications :    ANTIBIOTICS/RELEVANT:  piperacillin/tazobactam IVPB. 3.375 Gram(s) IV Intermittent every 8 hours  vancomycin  IVPB          acetaminophen   Tablet. 650 milliGRAM(s) Oral every 6 hours PRN  atorvastatin 40 milliGRAM(s) Oral at bedtime  diltiazem    Tablet 60 milliGRAM(s) Oral every 6 hours  docusate sodium 100 milliGRAM(s) Oral three times a day  enoxaparin Injectable 40 milliGRAM(s) SubCutaneous daily  gabapentin 100 milliGRAM(s) Oral three times a day  lidocaine 5% Ointment 1 Application(s) Topical two times a day PRN  losartan 100 milliGRAM(s) Oral daily  metoprolol     tartrate 100 milliGRAM(s) Oral two times a day  multivitamin 1 Tablet(s) Oral daily  oxyCODONE    5 mG/acetaminophen 325 mG 2 Tablet(s) Oral every 6 hours PRN  oxyCODONE    5 mG/acetaminophen 325 mG 1 Tablet(s) Oral every 6 hours PRN  pantoprazole  Injectable 40 milliGRAM(s) IV Push every 12 hours  sodium chloride 0.9%. 500 milliLiter(s) IV Continuous <Continuous>      Objective:    11-07 @ 07:01  -  11-08 @ 07:00  --------------------------------------------------------  IN: 1200 mL / OUT: 250 mL / NET: 950 mL      T(C): 36.5 (11-08-17 @ 04:59), Max: 36.7 (11-08-17 @ 01:12)  HR: 74 (11-08-17 @ 04:59) (66 - 74)  BP: 149/54 (11-08-17 @ 04:59) (138/69 - 156/79)  RR: 16 (11-08-17 @ 04:59) (16 - 18)  SpO2: 95% (11-08-17 @ 04:59) (95% - 97%)  Wt(kg): --      Physical Exam:  General: well developed well nourished, in no acute distress  Eyes: sclera anicteric, pupils equal and reactive to light  ENMT: buccal mucosa moist, pharynx not injected  Neck: supple, trachea midline  Lungs: clear, no wheeze/rhonchi  Cardiovascular: regular rate and rhythm, S1 S2  Abdomen: soft, nontender, no organomegaly present, bowel sounds normal  Neurological: alert and oriented x3, Cranial Nerves II-XII grossly intact  Skin: no increased ecchymosis/petechiae/purpura  Lymph Nodes: no palpable cervical/supraclavicular lymph nodes enlargements  Extremities: no cyanosis/clubbing/edema, dressing in place , no change from yesterday             LABS:                          9.1    12.4  )-----------( 281      ( 08 Nov 2017 08:04 )             29.6       11-08    143  |  112<H>  |  13  ----------------------------<  98  3.5   |  22  |  1.00    Ca    8.2<L>      08 Nov 2017 08:04    TPro  5.7<L>  /  Alb  2.2<L>  /  TBili  0.4  /  DBili  x   /  AST  10<L>  /  ALT  15  /  AlkPhos  60  11-08          Vancomycin Level, Trough: 19.1 ug/mL (11-06 @ 17:36)          RECENT CULTURES:    Assessment:  82 y.o. female with multiple medical problems Afib on Coumadin, HLD, CVA, MVP, OA, HTN, PVD (s/p RLE bypassx3, most recently 2012), Neuropathy, GERD  presents with  ischemic right foot and leg coupled with gangrene of right 2nd. toe.  She has multiple failed bypasses of right leg. 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 Her pain is out of proportion to the toe infection and likely is related to ischemic pain right leg .    She is scheduled for angiogram of right leg as recommended by vascular surgery , podiatry still feels the gangrenous toe should be amputated and then try HBOT     She has fecal impaction , most likely cause of abdominal pain.     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 She has severe PVD with occluded grafts       Plan :   - will decrease Vancomycin to 1 gram daily, continue with Zosyn    - continue with Iv antibiotics although doubt if it make any difference. The patient was made aware that she needs amputation of the right 2nd toe as it is now wet gangrene. It may not heal and she may require a AKA as she has severe PVD .  - Discussed with vascular surgery and Podiatry  by phone in detail   - trend renal function and CBC  - apply dressing to the toe   - overall prognosis is poor. Discussed with Dr. Casiano     Continue with present regime .  Appropriate use of antibiotics and adverse effects reviewed.    I have discussed the above plan of care with patient and  family in detail. They expressed understanding of the  treatment plan . Risks, benefits and alternatives discussed in detail. I have asked if they have any questions or concerns and appropriately addressed them to the best of my ability .    > 15 minutes were spent in direct patient care reviewing notes, medications ,labs data/ imaging , discussion with multidisciplinary team.    Thank you for allowing me to participate in care of your patient .    Judi Coyne MD  [phone]

## 2017-11-08 NOTE — PROGRESS NOTE ADULT - ATTENDING COMMENTS
82F PMHx Afib on Coumadin, HLD, CVA, MVP, OA, HTN, PVD (s/p RLE bypassx3, most recently ), Neuropathy, GERD presents with ischemic ulcer of R foot 2nd digit. Incidentally found w/ nonobstructing hepatic duct stone.     ·  Problem: Calculus of hepatic duct without obstruction.  Plan: CT abdomen showin mm common hepatic duct calculus with mild biliary dilatation.   s/p ercp with stone extraction  as per surgery (Dr. Lamb) : No lap rox as high chance of conversion to open surgery given previous surgeries and given that patient is not symptomatic at this time.      Problem/Plan - 2:  ·  Problem: Other acute osteomyelitis of right foot.  Plan: angiogram of RLE shows extensive disease. options discussed w/ vascular surgery and discussed with patient. Patient has poor perfusion to the extremity but given current presence of gangrene tow that is draining, pt scheduled for digital amuptatuon in am, no medical contraindication, with high risk   hypobaric therapy afterwards with potential for further amputation if inadequate healing.  Medically optimized for procedure. Cardiac cleared for procedure. NPO after midnight  continue vanco/zosyn for now. f/u vanc trough  blood cx and urine cx NTD  vascular/ID/podiatry following.     Acute blood loss anemia.  Plan: may be MDS considering how well she has responded to 1 unit of pRBC's  improving. continue to monitor H/h  continue to transfuse if hb< 7  continue protonix 40 mg BID IV  hold all AC for now  s/p endoscopy showing gastritis, hiatal hernia, with biopsy of the antrum and the body. Tortuous esophagus. Biopsy negative for h. pylori.

## 2017-11-08 NOTE — PROGRESS NOTE ADULT - PROBLEM SELECTOR PLAN 7
CT angiography showing multiple sites of occlusion in b/l lower extremities  IR angiogram showing Multilevel occlusions involving the right common femoral   artery, the superficial femoral artery with collateral supplying vessel under the knee  As per Vascular surgery (Dr. Sánchez) and cardiology (Dr. Ba[facility] group) chance of success at a 4th bypass is very small given her prior failed grafts and she is at high cardiac risk for the procedure.

## 2017-11-08 NOTE — PROGRESS NOTE ADULT - ASSESSMENT
82F PMHx Afib on Coumadin, HLD, CVA, MVP, OA, HTN, PVD (s/p RLE bypassx3, most recently 2012), Neuropathy, GERD presents with pain and swelling to right second toe a/w RLE cellulitis, r/o osteomyelitis. Found to have symptomatic anemia     - S/P EGD with noevidence of bleeding and ERCP with removal of gall stones  - Patient converted back to SR, continue Cardizem 60mg q6 and metoprolol 100 BID. HR and BP acceptable.  - No evidence of bradycardia or tachycardia per flow sheet  - No evidence of acute ischemia or meaningful volume overload  - Please resume AC (short-acting) when cleared by GI  - Extensive history of PVD and stenotic valvular disease. Now with gangrene. Vascular recs appreciated. F/u ID recs  - Patient remains undecided about amputation.  Spoke to patient about her decision but states she wants "to take it slow" and would want to discuss it with her daughter and hopes that "pain will go away"  - TTE (Nov 4/17): Technically limited study. normal left ventricular size and systolic function, estimated LVEF of 65%. Grade 1 diastolic dysfunction. Normal biatrial size. The mitral valve is structurally normal, 1+ MR.  - Monitor electrolytes, keep K>4, Mg>2  - Will continue to follow    Isabel Quintero NP  Cardiology 82F PMHx Afib on Coumadin, HLD, CVA, MVP, OA, HTN, PVD (s/p RLE bypassx3, most recently 2012), Neuropathy, GERD presents with pain and swelling to right second toe a/w RLE cellulitis, r/o osteomyelitis. Found to have symptomatic anemia     - S/P EGD with noevidence of bleeding and ERCP with removal of gall stones  - Patient converted back to SR, continue Cardizem 60mg q6 and metoprolol 100 BID. HR and BP acceptable.  - No evidence of bradycardia or tachycardia per flow sheet  - No evidence of acute ischemia or meaningful volume overload  - Extensive history of PVD and stenotic valvular disease. Now with gangrene. Vascular recs appreciated. F/u ID recs  - is now planned for toe amp tomorrow, with further amputation as indicated for non-healing  - would consider her optimized from a cv perspective for her upcoming procedure tomorrow.  routine hemodynamic monitoring is recommended  - eventual resumption of ac  - TTE (Nov 4/17): Technically limited study. normal left ventricular size and systolic function, estimated LVEF of 65%. Grade 1 diastolic dysfunction. Normal biatrial size. The mitral valve is structurally normal, 1+ MR.  - Monitor electrolytes, keep K>4, Mg>2  - Will continue to follow    Isabel Quintero NP  Cardiology

## 2017-11-08 NOTE — PROGRESS NOTE ADULT - PROBLEM SELECTOR PLAN 5
likely source of abdominal pain - resolved s/p smog enema  restart colace with meals for constipation

## 2017-11-08 NOTE — PROGRESS NOTE ADULT - PROBLEM SELECTOR PLAN 2
angiogram of RLE shows extensive disease. options discussed w/ vascular surgery and discussed with patient. Patient has poor perfusion to the extremity but given current presence of gangrene tow that is draining, revisiting option of digital amputation.  for OR with podiatry tomorrow. considering hypobaric therapy afterwards with potential for further amputation if inadequate healing.  Medically optimized for procedure. Cardiac cleared for procedure. NPO after midnight  continue vanco/zosyn for now. f/u vanc trough  blood cx and urine cx NTD  vascular/ID/podiatry following

## 2017-11-08 NOTE — PROGRESS NOTE ADULT - PROBLEM SELECTOR PLAN 3
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 continue to monitor H/h  continue to transfuse if hb< 7  continue protonix 40 mg BID IV  hold all AC for now  s/p endoscopy showing gastritis, hiatal hernia, with biopsy of the antrum and the body. Tortuous esophagus may be MDS considering how well she has responded to 1 unit of pRBC's  improving. continue to monitor H/h  continue to transfuse if hb< 7  continue protonix 40 mg BID IV  hold all AC for now  s/p endoscopy showing gastritis, hiatal hernia, with biopsy of the antrum and the body. Tortuous esophagus. Biopsy negative for h. pylori

## 2017-11-08 NOTE — PROGRESS NOTE ADULT - SUBJECTIVE AND OBJECTIVE BOX
Patient is a 82y old  Female who presents with a chief complaint of weakness and pain both legs (28 Oct 2017 20:23)    INTERVAL HPI/OVERNIGHT EVENTS:  Patient Had no overnight events. Patient was seen today morning at the bedside. Patient has no complaints today. Denies fever, sob, chest pain, nausea, vomiting, diarrhea and constipation. Is tolerating diet without issue. states that she still has some pain localized to her right 2nd toe but that she has relief with her pain medications. Yesterday, her foot was noted to be gangrenous and draining.    T(C): 36.5 (11-08-17 @ 04:59), Max: 36.7 (11-08-17 @ 01:12)  HR: 74 (11-08-17 @ 04:59) (66 - 74)  BP: 112/66 (11-08-17 @ 11:48) (112/66 - 156/79)  RR: 16 (11-08-17 @ 11:48) (16 - 17)  SpO2: 96% (11-08-17 @ 11:48) (95% - 96%)  Wt(kg): --  I&O's Summary    07 Nov 2017 07:01  -  08 Nov 2017 07:00  --------------------------------------------------------  IN: 1200 mL / OUT: 250 mL / NET: 950 mL    08 Nov 2017 07:01  -  08 Nov 2017 13:39  --------------------------------------------------------  IN: 360 mL / OUT: 0 mL / NET: 360 mL        REVIEW OF SYSTEMS:  CONSTITUTIONAL: denies fever, chills, fatigue, weakness  HEENT: denies blurred vision, sore throat  SKIN: denies new lesions, rash  CARDIOVASCULAR: denies chest pain, chest pressure, palpitations  RESPIRATORY: denies shortness of breath, sputum production  GASTROINTESTINAL: denies nausea, vomiting, diarrhea, abdominal pain, dyschezia  GENITOURINARY: denies dysuria, discharge  NEUROLOGICAL: denies numbness, headache, focal weakness  MUSCULOSKELETAL: denies new joint pain, muscle aches  PSYCH: denies anxiety depression at this time    PHYSICAL EXAM:  GENERAL: NAD,   HEAD:  Atraumatic, Normocephalic  EYES: EOMI, fixed dilated pupil on the left  NERVOUS SYSTEM:  Alert & Oriented X3, poor concentration; Motor Strength 5/5 B/L upper and lower extremities;   CHEST/LUNG: Clear to percussion bilaterally; No rales, rhonchi, wheezing, or rubs  HEART: irregular rate and rhythm; No murmurs, rubs, or gallops  ABDOMEN: Soft, Nontender, Nondistended; Bowel sounds present  EXTREMITIES:  trace Peripheral Pulses in bilateral lower extremities, b/l feet are warm, tender to palpation on the right foot at the toe    SKIN: right foot, currently wrapped with pearl with brown discharge noted.     MEDICATIONS  (STANDING):  atorvastatin 40 milliGRAM(s) Oral at bedtime  diltiazem    Tablet 60 milliGRAM(s) Oral every 6 hours  docusate sodium 100 milliGRAM(s) Oral three times a day  enoxaparin Injectable 40 milliGRAM(s) SubCutaneous daily  gabapentin 100 milliGRAM(s) Oral three times a day  losartan 100 milliGRAM(s) Oral daily  metoprolol     tartrate 100 milliGRAM(s) Oral two times a day  multivitamin 1 Tablet(s) Oral daily  pantoprazole  Injectable 40 milliGRAM(s) IV Push every 12 hours  piperacillin/tazobactam IVPB. 3.375 Gram(s) IV Intermittent every 8 hours  sodium chloride 0.9%. 500 milliLiter(s) (100 mL/Hr) IV Continuous <Continuous>  vancomycin  IVPB        MEDICATIONS  (PRN):  acetaminophen   Tablet. 650 milliGRAM(s) Oral every 6 hours PRN Mild Pain (1 - 3)  lidocaine 5% Ointment 1 Application(s) Topical two times a day PRN neuropathic pain  oxyCODONE    5 mG/acetaminophen 325 mG 2 Tablet(s) Oral every 6 hours PRN Severe Pain (7 - 10)  oxyCODONE    5 mG/acetaminophen 325 mG 1 Tablet(s) Oral every 6 hours PRN Moderate Pain (4 - 6)      LABS:                        9.1    12.4  )-----------( 281      ( 08 Nov 2017 08:04 )             29.6     11-08    143  |  112<H>  |  13  ----------------------------<  98  3.5   |  22  |  1.00    Ca    8.2<L>      08 Nov 2017 08:04    TPro  5.7<L>  /  Alb  2.2<L>  /  TBili  0.4  /  DBili  x   /  AST  10<L>  /  ALT  15  /  AlkPhos  60  11-08        CAPILLARY BLOOD GLUCOSE

## 2017-11-09 ENCOUNTER — RESULT REVIEW (OUTPATIENT)
Age: 82
End: 2017-11-09

## 2017-11-09 LAB
ANION GAP SERPL CALC-SCNC: 10 MMOL/L — SIGNIFICANT CHANGE UP (ref 5–17)
BUN SERPL-MCNC: 17 MG/DL — SIGNIFICANT CHANGE UP (ref 7–23)
CALCIUM SERPL-MCNC: 8.5 MG/DL — SIGNIFICANT CHANGE UP (ref 8.5–10.1)
CHLORIDE SERPL-SCNC: 110 MMOL/L — HIGH (ref 96–108)
CO2 SERPL-SCNC: 23 MMOL/L — SIGNIFICANT CHANGE UP (ref 22–31)
CREAT SERPL-MCNC: 0.98 MG/DL — SIGNIFICANT CHANGE UP (ref 0.5–1.3)
GLUCOSE SERPL-MCNC: 91 MG/DL — SIGNIFICANT CHANGE UP (ref 70–99)
GRAM STN FLD: SIGNIFICANT CHANGE UP
GRAM STN FLD: SIGNIFICANT CHANGE UP
HCT VFR BLD CALC: 29.8 % — LOW (ref 34.5–45)
HGB BLD-MCNC: 9.1 G/DL — LOW (ref 11.5–15.5)
INR BLD: 1.11 RATIO — SIGNIFICANT CHANGE UP (ref 0.88–1.16)
MCHC RBC-ENTMCNC: 28.4 PG — SIGNIFICANT CHANGE UP (ref 27–34)
MCHC RBC-ENTMCNC: 30.5 GM/DL — LOW (ref 32–36)
MCV RBC AUTO: 93.1 FL — SIGNIFICANT CHANGE UP (ref 80–100)
PLATELET # BLD AUTO: 270 K/UL — SIGNIFICANT CHANGE UP (ref 150–400)
POTASSIUM SERPL-MCNC: 3.2 MMOL/L — LOW (ref 3.5–5.3)
POTASSIUM SERPL-SCNC: 3.2 MMOL/L — LOW (ref 3.5–5.3)
PROTHROM AB SERPL-ACNC: 12.1 SEC — SIGNIFICANT CHANGE UP (ref 9.8–12.7)
RBC # BLD: 3.19 M/UL — LOW (ref 3.8–5.2)
RBC # FLD: 15.2 % — HIGH (ref 10.3–14.5)
SODIUM SERPL-SCNC: 143 MMOL/L — SIGNIFICANT CHANGE UP (ref 135–145)
SPECIMEN SOURCE: SIGNIFICANT CHANGE UP
SPECIMEN SOURCE: SIGNIFICANT CHANGE UP
WBC # BLD: 10 K/UL — SIGNIFICANT CHANGE UP (ref 3.8–10.5)
WBC # FLD AUTO: 10 K/UL — SIGNIFICANT CHANGE UP (ref 3.8–10.5)

## 2017-11-09 PROCEDURE — 99233 SBSQ HOSP IP/OBS HIGH 50: CPT

## 2017-11-09 PROCEDURE — 88304 TISSUE EXAM BY PATHOLOGIST: CPT | Mod: 26

## 2017-11-09 PROCEDURE — 73630 X-RAY EXAM OF FOOT: CPT | Mod: 26,RT

## 2017-11-09 PROCEDURE — 88305 TISSUE EXAM BY PATHOLOGIST: CPT | Mod: 26

## 2017-11-09 PROCEDURE — 88311 DECALCIFY TISSUE: CPT | Mod: 26

## 2017-11-09 RX ORDER — ONDANSETRON 8 MG/1
4 TABLET, FILM COATED ORAL ONCE
Qty: 0 | Refills: 0 | Status: DISCONTINUED | OUTPATIENT
Start: 2017-11-09 | End: 2017-11-09

## 2017-11-09 RX ORDER — METOPROLOL TARTRATE 50 MG
100 TABLET ORAL
Qty: 0 | Refills: 0 | Status: DISCONTINUED | OUTPATIENT
Start: 2017-11-09 | End: 2017-11-15

## 2017-11-09 RX ORDER — DOCUSATE SODIUM 100 MG
100 CAPSULE ORAL THREE TIMES A DAY
Qty: 0 | Refills: 0 | Status: DISCONTINUED | OUTPATIENT
Start: 2017-11-09 | End: 2017-11-13

## 2017-11-09 RX ORDER — POTASSIUM CHLORIDE 20 MEQ
10 PACKET (EA) ORAL
Qty: 0 | Refills: 0 | Status: COMPLETED | OUTPATIENT
Start: 2017-11-09 | End: 2017-11-09

## 2017-11-09 RX ORDER — OXYCODONE HYDROCHLORIDE 5 MG/1
5 TABLET ORAL EVERY 4 HOURS
Qty: 0 | Refills: 0 | Status: DISCONTINUED | OUTPATIENT
Start: 2017-11-09 | End: 2017-11-09

## 2017-11-09 RX ORDER — SODIUM CHLORIDE 9 MG/ML
1000 INJECTION INTRAMUSCULAR; INTRAVENOUS; SUBCUTANEOUS
Qty: 0 | Refills: 0 | Status: DISCONTINUED | OUTPATIENT
Start: 2017-11-09 | End: 2017-11-09

## 2017-11-09 RX ORDER — GABAPENTIN 400 MG/1
100 CAPSULE ORAL THREE TIMES A DAY
Qty: 0 | Refills: 0 | Status: DISCONTINUED | OUTPATIENT
Start: 2017-11-09 | End: 2017-11-13

## 2017-11-09 RX ORDER — HYDROMORPHONE HYDROCHLORIDE 2 MG/ML
0.5 INJECTION INTRAMUSCULAR; INTRAVENOUS; SUBCUTANEOUS
Qty: 0 | Refills: 0 | Status: DISCONTINUED | OUTPATIENT
Start: 2017-11-09 | End: 2017-11-09

## 2017-11-09 RX ORDER — ACETAMINOPHEN 500 MG
650 TABLET ORAL EVERY 6 HOURS
Qty: 0 | Refills: 0 | Status: DISCONTINUED | OUTPATIENT
Start: 2017-11-09 | End: 2017-11-15

## 2017-11-09 RX ORDER — LOSARTAN POTASSIUM 100 MG/1
100 TABLET, FILM COATED ORAL DAILY
Qty: 0 | Refills: 0 | Status: DISCONTINUED | OUTPATIENT
Start: 2017-11-09 | End: 2017-11-15

## 2017-11-09 RX ORDER — HYDROMORPHONE HYDROCHLORIDE 2 MG/ML
2 INJECTION INTRAMUSCULAR; INTRAVENOUS; SUBCUTANEOUS EVERY 4 HOURS
Qty: 0 | Refills: 0 | Status: DISCONTINUED | OUTPATIENT
Start: 2017-11-09 | End: 2017-11-10

## 2017-11-09 RX ORDER — VANCOMYCIN HCL 1 G
1000 VIAL (EA) INTRAVENOUS ONCE
Qty: 0 | Refills: 0 | Status: COMPLETED | OUTPATIENT
Start: 2017-11-10 | End: 2017-11-10

## 2017-11-09 RX ORDER — ATORVASTATIN CALCIUM 80 MG/1
40 TABLET, FILM COATED ORAL AT BEDTIME
Qty: 0 | Refills: 0 | Status: DISCONTINUED | OUTPATIENT
Start: 2017-11-09 | End: 2017-11-15

## 2017-11-09 RX ORDER — PIPERACILLIN AND TAZOBACTAM 4; .5 G/20ML; G/20ML
3.38 INJECTION, POWDER, LYOPHILIZED, FOR SOLUTION INTRAVENOUS EVERY 8 HOURS
Qty: 0 | Refills: 0 | Status: DISCONTINUED | OUTPATIENT
Start: 2017-11-09 | End: 2017-11-11

## 2017-11-09 RX ORDER — OXYCODONE HYDROCHLORIDE 5 MG/1
5 TABLET ORAL EVERY 4 HOURS
Qty: 0 | Refills: 0 | Status: DISCONTINUED | OUTPATIENT
Start: 2017-11-09 | End: 2017-11-15

## 2017-11-09 RX ORDER — PANTOPRAZOLE SODIUM 20 MG/1
40 TABLET, DELAYED RELEASE ORAL EVERY 12 HOURS
Qty: 0 | Refills: 0 | Status: DISCONTINUED | OUTPATIENT
Start: 2017-11-09 | End: 2017-11-14

## 2017-11-09 RX ORDER — ONDANSETRON 8 MG/1
4 TABLET, FILM COATED ORAL ONCE
Qty: 0 | Refills: 0 | Status: DISCONTINUED | OUTPATIENT
Start: 2017-11-09 | End: 2017-11-15

## 2017-11-09 RX ADMIN — PIPERACILLIN AND TAZOBACTAM 25 GRAM(S): 4; .5 INJECTION, POWDER, LYOPHILIZED, FOR SOLUTION INTRAVENOUS at 22:34

## 2017-11-09 RX ADMIN — PANTOPRAZOLE SODIUM 40 MILLIGRAM(S): 20 TABLET, DELAYED RELEASE ORAL at 17:49

## 2017-11-09 RX ADMIN — Medication 100 MILLIEQUIVALENT(S): at 12:52

## 2017-11-09 RX ADMIN — GABAPENTIN 100 MILLIGRAM(S): 400 CAPSULE ORAL at 22:32

## 2017-11-09 RX ADMIN — Medication 100 MILLIEQUIVALENT(S): at 09:28

## 2017-11-09 RX ADMIN — GABAPENTIN 100 MILLIGRAM(S): 400 CAPSULE ORAL at 06:35

## 2017-11-09 RX ADMIN — Medication 100 MILLIEQUIVALENT(S): at 10:38

## 2017-11-09 RX ADMIN — ATORVASTATIN CALCIUM 40 MILLIGRAM(S): 80 TABLET, FILM COATED ORAL at 22:32

## 2017-11-09 RX ADMIN — GABAPENTIN 100 MILLIGRAM(S): 400 CAPSULE ORAL at 16:33

## 2017-11-09 RX ADMIN — Medication 100 MILLIGRAM(S): at 17:49

## 2017-11-09 RX ADMIN — OXYCODONE AND ACETAMINOPHEN 2 TABLET(S): 5; 325 TABLET ORAL at 06:36

## 2017-11-09 RX ADMIN — Medication 100 MILLIGRAM(S): at 06:35

## 2017-11-09 RX ADMIN — Medication 100 MILLIGRAM(S): at 22:34

## 2017-11-09 RX ADMIN — HYDROMORPHONE HYDROCHLORIDE 2 MILLIGRAM(S): 2 INJECTION INTRAMUSCULAR; INTRAVENOUS; SUBCUTANEOUS at 22:55

## 2017-11-09 RX ADMIN — SODIUM CHLORIDE 75 MILLILITER(S): 9 INJECTION INTRAMUSCULAR; INTRAVENOUS; SUBCUTANEOUS at 13:01

## 2017-11-09 RX ADMIN — LOSARTAN POTASSIUM 100 MILLIGRAM(S): 100 TABLET, FILM COATED ORAL at 06:35

## 2017-11-09 RX ADMIN — Medication 100 MILLIGRAM(S): at 16:33

## 2017-11-09 RX ADMIN — PANTOPRAZOLE SODIUM 40 MILLIGRAM(S): 20 TABLET, DELAYED RELEASE ORAL at 06:35

## 2017-11-09 RX ADMIN — OXYCODONE HYDROCHLORIDE 5 MILLIGRAM(S): 5 TABLET ORAL at 19:23

## 2017-11-09 RX ADMIN — HYDROMORPHONE HYDROCHLORIDE 2 MILLIGRAM(S): 2 INJECTION INTRAMUSCULAR; INTRAVENOUS; SUBCUTANEOUS at 22:40

## 2017-11-09 RX ADMIN — PIPERACILLIN AND TAZOBACTAM 25 GRAM(S): 4; .5 INJECTION, POWDER, LYOPHILIZED, FOR SOLUTION INTRAVENOUS at 06:38

## 2017-11-09 RX ADMIN — OXYCODONE HYDROCHLORIDE 5 MILLIGRAM(S): 5 TABLET ORAL at 18:37

## 2017-11-09 NOTE — BRIEF OPERATIVE NOTE - PRE-OP DX
Ischemia of foot  11/02/2017    Active  Bryson Magallanes  Osteomyelitis of right foot, unspecified type  11/09/2017  R 2nd met head  Active  Nicholas Temple

## 2017-11-09 NOTE — PROGRESS NOTE ADULT - SUBJECTIVE AND OBJECTIVE BOX
[facility]    Dahiana Bañuelos, Evens, Toney, Terrie, Truman, Morgandiana      [phone]    CHIEF COMPLAINT: Patient is a 82y old  Female who presents with a chief complaint of weakness and pain both legs (28 Oct 2017 20:23)    HPI:  82F PMHx Afib on Coumadin, HLD, CVA, MVP, OA, HTN, PVD (s/p RLE bypassx3, most recently 2012), Neuropathy, GERD presents with pain and swelling to right second toe that started 1 month ago. Patient states foot became red, painful, and a small wound with a scab started forming on the top portion of her second toe. It became extremely difficult to bear weight on the foot so she went to see Dr. Isaacs at the wound center and he drained the wound at which point, yellow pus drained from the wound. He prescribed her an antibiotic (she does not recall which one). She denies any fevers, chills, chesst pain or shortness of breat. Admits to joint pain due to her RA.     In ED, pt's vital was wnl. WBC 8.2, Hct: 26.1, lactate 0.8, , CMP revealed elevated bilirubin of 1.5, remainder is wnl , INR: 2.26. CXR showed no acute pathology. X ray of Rt foot showed possible small periarticular erosion medial aspect head of the proximal phalanx which might represent infection/ osteomyelitis. Pt received 1 dose  of  vanco and 1 dose of zosyn and 1 bolus of NS, BCX, UCX was sent in  ED. (28 Oct 2017 13:42)    Follow Up: Patient seen and examined at bedside. NPO for toe amputation today. Denies chest pain, SOB, palpitations.    Telemetry: not on tele     MEDICATIONS  (STANDING):  atorvastatin 40 milliGRAM(s) Oral at bedtime  diltiazem    Tablet 60 milliGRAM(s) Oral every 6 hours  docusate sodium 100 milliGRAM(s) Oral three times a day  gabapentin 100 milliGRAM(s) Oral three times a day  losartan 100 milliGRAM(s) Oral daily  metoprolol     tartrate 100 milliGRAM(s) Oral two times a day  multivitamin 1 Tablet(s) Oral daily  pantoprazole  Injectable 40 milliGRAM(s) IV Push every 12 hours  piperacillin/tazobactam IVPB. 3.375 Gram(s) IV Intermittent every 8 hours  potassium chloride  10 mEq/100 mL IVPB 10 milliEquivalent(s) IV Intermittent every 1 hour  sodium chloride 0.9%. 500 milliLiter(s) (100 mL/Hr) IV Continuous <Continuous>  vancomycin  IVPB        MEDICATIONS  (PRN):  acetaminophen   Tablet. 650 milliGRAM(s) Oral every 6 hours PRN Mild Pain (1 - 3)  lidocaine 5% Ointment 1 Application(s) Topical two times a day PRN neuropathic pain  oxyCODONE    5 mG/acetaminophen 325 mG 2 Tablet(s) Oral every 6 hours PRN Severe Pain (7 - 10)  oxyCODONE    5 mG/acetaminophen 325 mG 1 Tablet(s) Oral every 6 hours PRN Moderate Pain (4 - 6)      REVIEW OF SYSTEMS:  eye, ent, GI, , allergic, dermatologic, musculoskeletal and neurologic are negative except as described above    Vital Signs Last 24 Hrs  T(C): 36.6 (09 Nov 2017 04:25), Max: 37 (08 Nov 2017 19:38)  T(F): 97.9 (09 Nov 2017 04:25), Max: 98.6 (08 Nov 2017 19:38)  HR: 69 (09 Nov 2017 04:25) (69 - 78)  BP: 147/76 (09 Nov 2017 04:25) (112/66 - 147/76)  BP(mean): --  RR: 17 (09 Nov 2017 04:25) (16 - 17)  SpO2: 97% (09 Nov 2017 04:25) (96% - 97%)    I&O's Summary    08 Nov 2017 07:01  -  09 Nov 2017 07:00  --------------------------------------------------------  IN: 1800 mL / OUT: 0 mL / NET: 1800 mL        Telemetry past 24h:    PHYSICAL EXAM  Constitutional: elderly female in NAD  HEENT:  EOMI, fixed dilated left pupil  Pulmonary: Non-labored, breath sounds are clear bilaterally, No wheezing, rales or rhonchi  Cardiovascular: Regular, S1 and S2.  No murmur.  No rubs, gallops or clicks  Gastrointestinal: Bowel Sounds present, soft, nontender.   Lymph: No peripheral edema.   Neurological: Alert, no focal deficits  Skin: No rashes. Cold lower extremities. Right foot currently wrapped  Psych:  Mood & affect appropriate    LABS: All Labs Reviewed:                        9.1    10.0  )-----------( 270      ( 09 Nov 2017 07:07 )             29.8                         9.1    12.4  )-----------( 281      ( 08 Nov 2017 08:04 )             29.6                         10.0   12.3  )-----------( 277      ( 07 Nov 2017 08:18 )             32.4     09 Nov 2017 07:07    143    |  110    |  17     ----------------------------<  91     3.2     |  23     |  0.98   08 Nov 2017 08:04    143    |  112    |  13     ----------------------------<  98     3.5     |  22     |  1.00   07 Nov 2017 08:18    143    |  109    |  12     ----------------------------<  95     3.1     |  24     |  0.96     Ca    8.5        09 Nov 2017 07:07  Ca    8.2        08 Nov 2017 08:04  Ca    7.8        07 Nov 2017 08:18    TPro  5.7    /  Alb  2.2    /  TBili  0.4    /  DBili  x      /  AST  10     /  ALT  15     /  AlkPhos  60     08 Nov 2017 08:04  TPro  5.9    /  Alb  2.3    /  TBili  0.5    /  DBili  x      /  AST  8      /  ALT  16     /  AlkPhos  68     07 Nov 2017 08:18  TPro  6.3    /  Alb  2.4    /  TBili  0.7    /  DBili  x      /  AST  11     /  ALT  17     /  AlkPhos  70     06 Nov 2017 09:07    PT/INR - ( 09 Nov 2017 07:07 )   PT: 12.1 sec;   INR: 1.11 ratio             ECG:  < from: 12 Lead ECG (10.28.17 @ 13:32) >    Ventricular Rate 81 BPM    Atrial Rate 81 BPM    P-R Interval 172 ms    QRS Duration 96 ms    Q-T Interval 416 ms    QTC Calculation(Bezet) 483 ms    P Axis 16 degrees    R Axis -34 degrees    T Axis 8 degrees    Diagnosis Line Normal sinus rhythm  Left axis deviation  Abnormal ECG  When compared with ECG of 14-JUN-2017 11:52,  No significant change was found  Confirmed by Campbell Horner MD ([id number]) on 10/29/2017 1:49:49 PM    < end of copied text >    Echo:  < from: TTE Echo Doppler w/o Cont (11.04.17 @ 08:47) >     EXAM:  ECHO TTE W/O CON COMP W/DOPPLR         PROCEDURE DATE:  11/04/2017        INTERPRETATION:  INDICATION: aortic valve disease    Blood Pressure 167/83    Height 152     Weight 51       BSA 1.5    Dimensions:    LA 3.2       Normal Values: 2.0- 4.0 cm    Ao 3.0        Normal Values: 2.0 - 3.8 cm  SEPTUM 1.0       Normal Values: 0.6 - 1.2 cm  PWT 1.0       Normal Values: 0.6 - 1.1 cm  LVIDd 3.5         Normal Values: 3.0 - 5.6 cm  LVIDs 2.6         Normal Values: 1.8 - 4.0 cm    Derived Variables:  LVMI     g/m2  RWT      Fractional Short      Ejection Fraction 65    Doppler Peak v. AoV=   (m/sec)    OBSERVATIONS:    Mitral Valve: normal, 1+ MR.  Aortic Valve/Aorta: Calcified trileaflet aortic valve. Minimal AI  Tricuspid Valve: normalwith trace TR.  Pulmonic Valve: normal  Left Atrium: normal  Right Atrium: normal  Left Ventricle: Technically limited study. As visualized, there is normal   LV size and systolic function, estimated LVEF of 65%.  Right Ventricle: Not well visualized, probably normal  Pericardium/Pleura: no significant pleural effusion, no significant   pericardial effusion.  Pulmonary/RV Pressure: Inadequate TR jet to estimate PA systolic pressure.  LV Diastolic Function: Grade 1 diastolic dysfunction.     Technically limited study. As visualized, there is normal left   ventricular size and systolic function, estimated LVEF of 65%. The right   ventricle is not well seen, but is probably normal in size with normal   systolic function. Grade 1 diastolic dysfunction. Normal biatrial size.   The aortic root is normal in size. The mitral valve is structurally   normal, 1+ MR. The aortic valve is calcified without stenosis. There is   minimal aortic insufficiency. Trace physiologic TR. Inadequate TR jet to   estimate PA systolic pressure. No significant pericardial effusion.      FREDI DALAL M.D., ATTENDING CARDIOLOGIST  This document has been electronically signed. Nov 4 2017  5:20PM

## 2017-11-09 NOTE — PRE-OP CHECKLIST - SELECT TESTS ORDERED
EKG/CBC/CXR/BMP CMP/Type and Screen/INR/Urinalysis/EKG/PT/PTT/Hepatic Function/CBC/CXR/HGA1C, Lactate, Culture

## 2017-11-09 NOTE — PROGRESS NOTE ADULT - SUBJECTIVE AND OBJECTIVE BOX
CARITO CONSTANTINO is a 82yFemale , patient examined and chart reviewed. 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  Osteoarthritis  Afib  PVD (peripheral vascular disease)  Neuropathy: (Right lower leg)  H/O cerebral aneurysm repair: brain clips  CVA (cerebral vascular accident): (&quot;Mini-stroke&quot;,1990&#x27;s)  Rheumatoid arthritis  Hyperlipidemia  Hypertension  S/P cataract surgery: (Left eye)  Elective surgery: (&quot;Twisted bowel&quot;, 2014)  Elective surgery: (Exision of cyst on liver, 1985)  S/P ORIF (open reduction internal fixation) fracture: Left hip fx (2012) &amp; R hip fx (2013)  H/O cerebral aneurysm repair: Brain clips (1978(  Renal stone: Cysto stent placement 10/1/2014  PVD (peripheral vascular disease): s/p RLE bypass x 3, most recent 3/2012 Right external iliac to PT bypass w/ PTFE (2012)  S/P FRED (total abdominal hysterectomy): (1987, Hx of &quot;ovarian cancer?&quot;)      For details regarding the patient's social history, family history, and other miscellaneous elements, please refer the initial infectious diseases consultation and/or the admitting history and physical examination for this admission.    ROS:  CONSTITUTIONAL:  Negative fever or chills, feels well, good appetite  EYES:  Negative  blurry vision or double vision  CARDIOVASCULAR:  Negative for chest pain or palpitations  RESPIRATORY:  Negative for cough, wheezing, or SOB   GASTROINTESTINAL:  Negative for nausea, vomiting, diarrhea, constipation, or abdominal pain  GENITOURINARY:  Negative frequency, urgency or dysuria  NEUROLOGIC:  No headache, confusion, dizziness, lightheadedness  All other systems were reviewed and are negative         Current inpatient medications :    ANTIBIOTICS/RELEVANT:  piperacillin/tazobactam IVPB. 3.375 Gram(s) IV Intermittent every 8 hours  vancomycin  IVPB 1000 milliGRAM(s) IV Intermittent every 24 hours  vancomycin  IVPB          acetaminophen   Tablet. 650 milliGRAM(s) Oral every 6 hours PRN  atorvastatin 40 milliGRAM(s) Oral at bedtime  diltiazem    Tablet 60 milliGRAM(s) Oral every 6 hours  docusate sodium 100 milliGRAM(s) Oral three times a day  gabapentin 100 milliGRAM(s) Oral three times a day  lidocaine 5% Ointment 1 Application(s) Topical two times a day PRN  losartan 100 milliGRAM(s) Oral daily  metoprolol     tartrate 100 milliGRAM(s) Oral two times a day  multivitamin 1 Tablet(s) Oral daily  oxyCODONE    5 mG/acetaminophen 325 mG 2 Tablet(s) Oral every 6 hours PRN  oxyCODONE    5 mG/acetaminophen 325 mG 1 Tablet(s) Oral every 6 hours PRN  pantoprazole  Injectable 40 milliGRAM(s) IV Push every 12 hours  potassium chloride  10 mEq/100 mL IVPB 10 milliEquivalent(s) IV Intermittent every 1 hour  sodium chloride 0.9%. 500 milliLiter(s) IV Continuous <Continuous>      Objective:    11-08 @ 07:01  -  11-09 @ 07:00  --------------------------------------------------------  IN: 1800 mL / OUT: 0 mL / NET: 1800 mL      T(C): 36.6 (11-09-17 @ 04:25), Max: 37 (11-08-17 @ 19:38)  HR: 69 (11-09-17 @ 04:25) (69 - 78)  BP: 147/76 (11-09-17 @ 04:25) (112/66 - 147/76)  RR: 17 (11-09-17 @ 04:25) (16 - 17)  SpO2: 97% (11-09-17 @ 04:25) (96% - 97%)  Wt(kg): --      Physical Exam:  General: well developed well nourished, in no acute distress  Eyes: sclera anicteric, pupils equal and reactive to light  ENMT: buccal mucosa moist, pharynx not injected  Neck: supple, trachea midline  Lungs: clear, no wheeze/rhonchi  Cardiovascular: regular rate and rhythm, S1 S2  Abdomen: soft, nontender, no organomegaly present, bowel sounds normal  Neurological: alert and oriented x3, Cranial Nerves II-XII grossly intact  Skin: no increased ecchymosis/petechiae/purpura  Lymph Nodes: no palpable cervical/supraclavicular lymph nodes enlargements  Extremities: no cyanosis/clubbing, right foot dressing in place             LABS:                          9.1    10.0  )-----------( 270      ( 09 Nov 2017 07:07 )             29.8       11-09    143  |  110<H>  |  17  ----------------------------<  91  3.2<L>   |  23  |  0.98    Ca    8.5      09 Nov 2017 07:07    TPro  5.7<L>  /  Alb  2.2<L>  /  TBili  0.4  /  DBili  x   /  AST  10<L>  /  ALT  15  /  AlkPhos  60  11-08      PT/INR - ( 09 Nov 2017 07:07 )   PT: 12.1 sec;   INR: 1.11 ratio    Assessment:  82 y.o. female with multiple medical problems Afib on Coumadin, HLD, CVA, MVP, OA, HTN, PVD (s/p RLE bypassx3, most recently 2012), Neuropathy, GERD  presents with  ischemic right foot and leg coupled with gangrene of right 2nd. toe.  She has multiple failed bypasses of right leg. 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 Her pain is out of proportion to the toe infection and likely is related to ischemic pain right leg .    She is scheduled for angiogram of right leg as recommended by vascular surgery , podiatry still feels the gangrenous toe should be amputated and then try HBOT     She has fecal impaction , most likely cause of abdominal pain.     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 She has severe PVD with occluded grafts       Plan :   - will continue with  Vancomycin and Zosyn    - continue with Iv antibiotics although doubt if it make any difference. The patient was made aware that she needs amputation of the right 2nd toe as it is now wet gangrene. It may not heal and she may require a AKA as she has severe PVD .  - Discussed with vascular surgery and Podiatry  by phone in detail   - trend renal function and CBC  - overall prognosis is poor. Discussed with Dr. Casiano     Continue with present regime .  Appropriate use of antibiotics and adverse effects reviewed.     I have discussed the above plan of care with patient and  family in detail. They expressed understanding of the  treatment plan . Risks, benefits and alternatives discussed in detail. I have asked if they have any questions or concerns and appropriately addressed them to the best of my ability .    > 35 minutes were spent in direct patient care reviewing notes, medications ,labs data/ imaging , discussion with multidisciplinary team.    Thank you for allowing me to participate in care of your patient .    Judi Coyne MD  [phone]

## 2017-11-09 NOTE — PROGRESS NOTE ADULT - PROBLEM SELECTOR PLAN 3
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 continue to monitor H/h  continue to transfuse if hb< 7  continue protonix 40 mg BID IV  hold all AC for now  s/p endoscopy showing gastritis, hiatal hernia, with biopsy of the antrum and the body. Tortuous esophagus. Biopsy negative for h. pylori

## 2017-11-09 NOTE — PROGRESS NOTE ADULT - SUBJECTIVE AND OBJECTIVE BOX
Patient is a 82y old  Female who presents with a chief complaint of weakness and pain both legs (28 Oct 2017 20:23)      INTERVAL HPI/OVERNIGHT EVENTS:  Patient Had no overnight events. Patient was seen today morning at the bedside. Patient has no complaints today. Denies fever, sob, chest pain, nausea, vomiting, diarrhea and constipation. She admits to slight pain over the right 2nd toe that is imporving with her pain medication.    Vitals  T(C): 36.6 (11-09-17 @ 04:25), Max: 37 (11-08-17 @ 19:38)  HR: 69 (11-09-17 @ 04:25) (69 - 78)  BP: 147/76 (11-09-17 @ 04:25) (112/66 - 147/76)  RR: 17 (11-09-17 @ 04:25) (16 - 17)  SpO2: 97% (11-09-17 @ 04:25) (96% - 97%)  Wt(kg): --  I&O's Summary    08 Nov 2017 07:01  -  09 Nov 2017 07:00  --------------------------------------------------------  IN: 1800 mL / OUT: 0 mL / NET: 1800 mL          REVIEW OF SYSTEMS:  CONSTITUTIONAL: denies fever, chills, fatigue, weakness  HEENT: denies blurred vision, sore throat  SKIN: denies new lesions, rash  CARDIOVASCULAR: denies chest pain, chest pressure, palpitations  RESPIRATORY: denies shortness of breath, sputum production  GASTROINTESTINAL: denies nausea, vomiting, diarrhea, abdominal pain, dyschezia  GENITOURINARY: denies dysuria, discharge  NEUROLOGICAL: denies numbness, headache, focal weakness  MUSCULOSKELETAL: denies new joint pain, muscle aches  PSYCH: denies anxiety depression at this time    PHYSICAL EXAM:  GENERAL: NAD,   HEAD:  Atraumatic, Normocephalic  EYES: EOMI, fixed dilated pupil on the left  NERVOUS SYSTEM:  Alert & Oriented X3, poor concentration; Motor Strength 5/5 B/L upper and lower extremities;   CHEST/LUNG: Clear to percussion bilaterally; No rales, rhonchi, wheezing, or rubs  HEART: regular rate and rhythm; No murmurs, rubs, or gallops  ABDOMEN: Soft, Nontender, Nondistended; Bowel sounds present  EXTREMITIES:  trace Peripheral Pulses in bilateral lower extremities, b/l feet are warm, tender to palpation on the right foot at the toe    SKIN: right foot, currently wrapped with pearl    MEDICATIONS  (STANDING):  atorvastatin 40 milliGRAM(s) Oral at bedtime  diltiazem    Tablet 60 milliGRAM(s) Oral every 6 hours  docusate sodium 100 milliGRAM(s) Oral three times a day  gabapentin 100 milliGRAM(s) Oral three times a day  losartan 100 milliGRAM(s) Oral daily  metoprolol     tartrate 100 milliGRAM(s) Oral two times a day  multivitamin 1 Tablet(s) Oral daily  pantoprazole  Injectable 40 milliGRAM(s) IV Push every 12 hours  piperacillin/tazobactam IVPB. 3.375 Gram(s) IV Intermittent every 8 hours  potassium chloride  10 mEq/100 mL IVPB 10 milliEquivalent(s) IV Intermittent every 1 hour  sodium chloride 0.9%. 500 milliLiter(s) (100 mL/Hr) IV Continuous <Continuous>  vancomycin  IVPB        MEDICATIONS  (PRN):  acetaminophen   Tablet. 650 milliGRAM(s) Oral every 6 hours PRN Mild Pain (1 - 3)  lidocaine 5% Ointment 1 Application(s) Topical two times a day PRN neuropathic pain  oxyCODONE    5 mG/acetaminophen 325 mG 2 Tablet(s) Oral every 6 hours PRN Severe Pain (7 - 10)  oxyCODONE    5 mG/acetaminophen 325 mG 1 Tablet(s) Oral every 6 hours PRN Moderate Pain (4 - 6)      LABS:                        9.1    10.0  )-----------( 270      ( 09 Nov 2017 07:07 )             29.8     11-09    143  |  110<H>  |  17  ----------------------------<  91  3.2<L>   |  23  |  0.98    Ca    8.5      09 Nov 2017 07:07    TPro  5.7<L>  /  Alb  2.2<L>  /  TBili  0.4  /  DBili  x   /  AST  10<L>  /  ALT  15  /  AlkPhos  60  11-08    PT/INR - ( 09 Nov 2017 07:07 )   PT: 12.1 sec;   INR: 1.11 ratio

## 2017-11-09 NOTE — PROGRESS NOTE ADULT - PROBLEM SELECTOR PLAN 1
s/p ercp with stone extraction of hepatic duct calculus seen on ct  as per surgery (Dr. Lamb) : No lap rox as high chance of conversion to open surgery and patient asymptomatic currently

## 2017-11-09 NOTE — PROGRESS NOTE ADULT - PROBLEM SELECTOR PLAN 2
angiogram of RLE shows extensive disease and poor perfusion but given current presence of gangrene toe that is draining, will have digital amputation.  for OR with podiatry today - considering hypobaric therapy afterwards with potential for further amputation if inadequate healing.  Medically optimized for procedure. Cardiac cleared for procedure. NPO since midnight  continue vanco/zosyn for now. f/u vanc trough  blood cx and urine cx NTD  vascular/ID/podiatry following

## 2017-11-09 NOTE — PROGRESS NOTE ADULT - ASSESSMENT
82F PMHx Afib on Coumadin, HLD, CVA, MVP, OA, HTN, PVD (s/p RLE bypassx3, most recently 2012), Neuropathy, GERD presents with pain and swelling to right second toe a/w RLE cellulitis, r/o osteomyelitis. Found to have symptomatic anemia     -Patient converted back to SR, continue Cardizem 60mg q6 and metoprolol 100 BID. HR and BP acceptable.  -No evidence of acute ischemia or meaningful volume overload  -Extensive history of PVD and stenotic valvular disease. Now with gangrene. Plan for toe amputation today. Patient doesn't want to get leg amputation.    -Would consider her optimized but high risk from a CV perspective for her upcoming procedure. Routine hemodynamic monitoring is recommended.  -Monitor H/H, currently stable, transfuse as needed. Plan to resume AC after toe/leg amputation.   -TTE (Nov 4/17): Technically limited study. normal left ventricular size and systolic function, estimated LVEF of 65%. Grade 1 diastolic dysfunction. Normal biatrial size. The mitral valve is structurally normal, 1+ MR.  -Monitor electrolytes, keep K>4, Mg>2  -Will continue to follow 82F PMHx Afib on Coumadin, HLD, CVA, MVP, OA, HTN, PVD (s/p RLE bypassx3, most recently 2012), Neuropathy, GERD presents with pain and swelling to right second toe a/w RLE cellulitis, r/o osteomyelitis. Found to have symptomatic anemia     -Patient still in SR, continue Cardizem 60mg q6 and metoprolol 100 BID. HR and BP acceptable.  -No evidence of acute ischemia or meaningful volume overload  -Extensive history of PVD and stenotic valvular disease. Now with gangrene. Plan for toe amputation today. Patient doesn't want to get leg amputation, will consider further need for amputation based on healing.     -Would consider her optimized but high risk from a cardiovascular perspective for her upcoming procedure. Routine hemodynamic monitoring is recommended.  -Monitor H/H, currently stable, transfuse as needed. Plan to resume AC after toe/leg amputation.   -TTE (Nov 4/17): Technically limited study. Normal left ventricular size and systolic function, estimated LVEF of 65%. Grade 1 diastolic dysfunction. Normal biatrial size. The mitral valve is structurally normal, 1+ MR.  -Monitor electrolytes, keep K>4, Mg>2  -Will continue to follow

## 2017-11-09 NOTE — PROGRESS NOTE ADULT - PROBLEM SELECTOR PLAN 7
CT angiography and IR angiogram sever PAD of RLE  As per Vascular surgery (Dr. Sánchez) and cardiology (Dr. Bañuelos group) chance of success at a 4th bypass is very small given her prior failed grafts and she is at high cardiac risk for the procedure.  monitor for now and consider option of AKA

## 2017-11-09 NOTE — PROGRESS NOTE ADULT - PROBLEM SELECTOR PLAN 2
s/p ERCP w/ stone extraction  trend liver enzymes  asymptomatic at present  cholecystectomy deferred at this time -- re evaluate if patient symptomatic

## 2017-11-09 NOTE — PROGRESS NOTE ADULT - SUBJECTIVE AND OBJECTIVE BOX
INTERVAL HPI/OVERNIGHT EVENTS:    denies abd pain, blood in stools  scheduled for toe amputation today    HPI:  82F PMHx Afib on Coumadin, HLD, CVA, MVP, OA, HTN, PVD (s/p RLE bypassx3, most recently 2012), Neuropathy, GERD presents with pain and swelling to right second toe that started 1 month ago. Patient states foot became red, painful, and a small wound with a scab started forming on the top portion of her second toe. It became extremely difficult to bear weight on the foot so she went to see Dr. Isaacs at the wound center and he drained the wound at which point, yellow pus drained from the wound. He prescribed her an antibiotic (she does not recall which one). She denies any fevers, chills, chesst pain or shortness of breat. Admits to joint pain due to her RA.     In ED, pt's vital was wnl. WBC 8.2, Hct: 26.1, lactate 0.8, , CMP revealed elevated bilirubin of 1.5, remainder is wnl , INR: 2.26. CXR showed no acute pathology. X ray of Rt foot showed possible small periarticular erosion medial aspect head of the proximal phalanx which might represent infection/ osteomyelitis. Pt received 1 dose  of  vanco and 1 dose of zosyn and 1 bolus of NS, BCX, UCX was sent in  ED. (28 Oct 2017 13:42)    MEDICATIONS  (STANDING):  potassium chloride  10 mEq/100 mL IVPB 10 milliEquivalent(s) IV Intermittent every 1 hour    MEDICATIONS  (PRN):      Allergies    No Known Allergies    Intolerances          General:  No wt loss, fevers, chills, night sweats, fatigue,   Eyes:  Good vision, no reported pain  ENT:  No sore throat, pain, runny nose, dysphagia  CV:  No pain, palpitations, hypo/hypertension  Resp:  No dyspnea, cough, tachypnea, wheezing  GI:  No pain, No nausea, No vomiting, No diarrhea, No constipation, No weight loss, No fever, No pruritis, No rectal bleeding, No tarry stools, No dysphagia,  :  No pain, bleeding, incontinence, nocturia  Muscle:  No pain, weakness  Neuro:  No weakness, tingling, memory problems  Psych:  No fatigue, insomnia, mood problems, depression  Endocrine:  No polyuria, polydipsia, cold/heat intolerance  Heme:  No petechiae, ecchymosis, easy bruisability  Skin:  No rash, tattoos, scars, edema      PHYSICAL EXAM:   Vital Signs:  Vital Signs Last 24 Hrs  T(C): 36.7 (09 Nov 2017 11:31), Max: 37 (08 Nov 2017 19:38)  T(F): 98.1 (09 Nov 2017 11:31), Max: 98.6 (08 Nov 2017 19:38)  HR: 78 (09 Nov 2017 11:31) (69 - 78)  BP: 150/73 (09 Nov 2017 11:31) (115/70 - 150/73)  BP(mean): --  RR: 15 (09 Nov 2017 11:31) (15 - 17)  SpO2: 92% (09 Nov 2017 11:31) (92% - 97%)  Daily Height in cm: 157.48 (09 Nov 2017 10:43)    Daily I&O's Summary    08 Nov 2017 07:01  -  09 Nov 2017 07:00  --------------------------------------------------------  IN: 1800 mL / OUT: 0 mL / NET: 1800 mL        GENERAL:  Appears stated age, well-groomed, well-nourished, no distress  HEENT:  NC/AT,  conjunctivae clear and pink, no thyromegaly, nodules, adenopathy, no JVD, sclera -anicteric  CHEST:  Full & symmetric excursion, no increased effort, breath sounds clear  HEART:  Regular rhythm, S1, S2, no murmur/rub/S3/S4, no abdominal bruit, no edema  ABDOMEN:  Soft, non-tender, non-distended, normoactive bowel sounds,  no masses ,no hepato-splenomegaly, no signs of chronic liver disease  EXTEREMITIES:  no cyanosis,clubbing or edema  SKIN:  No rash/erythema/ecchymoses/petechiae/wounds/abscess/warm/dry  NEURO:  Alert, oriented, no asterixis, no tremor, no encephalopathy      LABS:                        9.1    10.0  )-----------( 270      ( 09 Nov 2017 07:07 )             29.8     11-09    143  |  110<H>  |  17  ----------------------------<  91  3.2<L>   |  23  |  0.98    Ca    8.5      09 Nov 2017 07:07    TPro  5.7<L>  /  Alb  2.2<L>  /  TBili  0.4  /  DBili  x   /  AST  10<L>  /  ALT  15  /  AlkPhos  60  11-08    PT/INR - ( 09 Nov 2017 07:07 )   PT: 12.1 sec;   INR: 1.11 ratio             amylase   lipase  RADIOLOGY & ADDITIONAL TESTS:

## 2017-11-09 NOTE — PROGRESS NOTE ADULT - PROBLEM SELECTOR PLAN 4
controlled  continue cardizem and metoprolol with parameters  maintain HR < 110  Anticoagulation held due to anemia and evaluation for GIB. Re-evaluate after amputation  f/u cardiology (Dr. De Los Santos)

## 2017-11-09 NOTE — BRIEF OPERATIVE NOTE - OPERATION/FINDINGS
occluded rt common femoral artery and grafts. Isolated SFA and popliteal segments, peroneal runoff to ankle, reconstituted post tibial to foot
see dictation

## 2017-11-09 NOTE — BRIEF OPERATIVE NOTE - PROCEDURE
<<-----Click on this checkbox to enter Procedure Amputation of toe and metatarsal bone of right foot  11/09/2017  2nd toe and 2nd met head resection right foot  Active  DIOGO

## 2017-11-09 NOTE — PRE-OP CHECKLIST - PATIENT'S PERSONAL PROPERTY GIVEN TO
You will need to supply us with a case number and the address and phone (and fax number) of the place in [facility]. It may be best for them to send us the form by fax.     Our address and fax number:   [facility]  [id number] [facility] [place] [address]  Phone: [phone]  Fax: [phone]    on unit

## 2017-11-09 NOTE — PROGRESS NOTE ADULT - ATTENDING COMMENTS
82F PMHx Afib on Coumadin, HLD, CVA, MVP, OA, HTN, PVD (s/p RLE bypassx3, most recently ), Neuropathy, GERD presents with ischemic ulcer of R foot 2nd digit. Incidentally found w/ nonobstructing hepatic duct stone.     : Calculus of hepatic duct without obstruction.  Plan: CT abdomen showin mm common hepatic duct calculus with mild biliary dilatation.   s/p ercp with stone extraction  as per surgery (Dr. Lamb) : No lap rox as high chance of conversion to open surgery given previous surgeries and given that patient is not symptomatic at this time.      Problem/Plan - 2:  ·  Problem: Other acute osteomyelitis of right foot.  Plan: angiogram of RLE shows extensive disease. options discussed w/ vascular surgery and discussed with patient. Patient has poor perfusion to the extremity but given current presence of gangrene tow that is draining, pt scheduled for digital amuptatuon in am, no medical contraindication, with high risk   hypobaric therapy afterwards with potential for further amputation if inadequate healing.  Medically optimized for procedure. Cardiac cleared for procedure.   continue vanco/zosyn for now. f/u vanc trough  blood cx and urine cx NTD  vascular/ID/podiatry following.     Acute blood loss anemia.  Plan: may be MDS considering how well she has responded to 1 unit of pRBC's  improving. continue to monitor H/h  continue to transfuse if hb< 7  continue protonix 40 mg BID IV  hold all AC for now  s/p endoscopy showing gastritis, hiatal hernia, with biopsy of the antrum and the body. Tortuous esophagus. Biopsy negative for h. pylori.

## 2017-11-10 ENCOUNTER — TRANSCRIPTION ENCOUNTER (OUTPATIENT)
Age: 82
End: 2017-11-10

## 2017-11-10 LAB
ANION GAP SERPL CALC-SCNC: 10 MMOL/L — SIGNIFICANT CHANGE UP (ref 5–17)
BUN SERPL-MCNC: 19 MG/DL — SIGNIFICANT CHANGE UP (ref 7–23)
CALCIUM SERPL-MCNC: 8.1 MG/DL — LOW (ref 8.5–10.1)
CHLORIDE SERPL-SCNC: 108 MMOL/L — SIGNIFICANT CHANGE UP (ref 96–108)
CO2 SERPL-SCNC: 24 MMOL/L — SIGNIFICANT CHANGE UP (ref 22–31)
CREAT SERPL-MCNC: 1.2 MG/DL — SIGNIFICANT CHANGE UP (ref 0.5–1.3)
GLUCOSE SERPL-MCNC: 93 MG/DL — SIGNIFICANT CHANGE UP (ref 70–99)
HCT VFR BLD CALC: 29.3 % — LOW (ref 34.5–45)
HGB BLD-MCNC: 9 G/DL — LOW (ref 11.5–15.5)
MCHC RBC-ENTMCNC: 28.5 PG — SIGNIFICANT CHANGE UP (ref 27–34)
MCHC RBC-ENTMCNC: 30.9 GM/DL — LOW (ref 32–36)
MCV RBC AUTO: 92.3 FL — SIGNIFICANT CHANGE UP (ref 80–100)
PLATELET # BLD AUTO: 274 K/UL — SIGNIFICANT CHANGE UP (ref 150–400)
POTASSIUM SERPL-MCNC: 3.4 MMOL/L — LOW (ref 3.5–5.3)
POTASSIUM SERPL-SCNC: 3.4 MMOL/L — LOW (ref 3.5–5.3)
RBC # BLD: 3.18 M/UL — LOW (ref 3.8–5.2)
RBC # FLD: 14.7 % — HIGH (ref 10.3–14.5)
SODIUM SERPL-SCNC: 142 MMOL/L — SIGNIFICANT CHANGE UP (ref 135–145)
WBC # BLD: 13.2 K/UL — HIGH (ref 3.8–10.5)
WBC # FLD AUTO: 13.2 K/UL — HIGH (ref 3.8–10.5)

## 2017-11-10 PROCEDURE — 99233 SBSQ HOSP IP/OBS HIGH 50: CPT

## 2017-11-10 RX ORDER — VANCOMYCIN HCL 1 G
1000 VIAL (EA) INTRAVENOUS EVERY 24 HOURS
Qty: 0 | Refills: 0 | Status: DISCONTINUED | OUTPATIENT
Start: 2017-11-10 | End: 2017-11-10

## 2017-11-10 RX ORDER — HEPARIN SODIUM 5000 [USP'U]/ML
5000 INJECTION INTRAVENOUS; SUBCUTANEOUS EVERY 8 HOURS
Qty: 0 | Refills: 0 | Status: DISCONTINUED | OUTPATIENT
Start: 2017-11-10 | End: 2017-11-11

## 2017-11-10 RX ORDER — VANCOMYCIN HCL 1 G
1000 VIAL (EA) INTRAVENOUS EVERY 24 HOURS
Qty: 0 | Refills: 0 | Status: DISCONTINUED | OUTPATIENT
Start: 2017-11-11 | End: 2017-11-12

## 2017-11-10 RX ORDER — OXYCODONE HYDROCHLORIDE 5 MG/1
10 TABLET ORAL EVERY 6 HOURS
Qty: 0 | Refills: 0 | Status: DISCONTINUED | OUTPATIENT
Start: 2017-11-10 | End: 2017-11-12

## 2017-11-10 RX ORDER — POTASSIUM CHLORIDE 20 MEQ
40 PACKET (EA) ORAL ONCE
Qty: 0 | Refills: 0 | Status: COMPLETED | OUTPATIENT
Start: 2017-11-10 | End: 2017-11-10

## 2017-11-10 RX ORDER — WARFARIN SODIUM 2.5 MG/1
2.5 TABLET ORAL ONCE
Qty: 0 | Refills: 0 | Status: COMPLETED | OUTPATIENT
Start: 2017-11-10 | End: 2017-11-10

## 2017-11-10 RX ADMIN — ATORVASTATIN CALCIUM 40 MILLIGRAM(S): 80 TABLET, FILM COATED ORAL at 22:31

## 2017-11-10 RX ADMIN — HYDROMORPHONE HYDROCHLORIDE 2 MILLIGRAM(S): 2 INJECTION INTRAMUSCULAR; INTRAVENOUS; SUBCUTANEOUS at 05:04

## 2017-11-10 RX ADMIN — Medication 100 MILLIGRAM(S): at 15:28

## 2017-11-10 RX ADMIN — HEPARIN SODIUM 5000 UNIT(S): 5000 INJECTION INTRAVENOUS; SUBCUTANEOUS at 14:28

## 2017-11-10 RX ADMIN — GABAPENTIN 100 MILLIGRAM(S): 400 CAPSULE ORAL at 22:31

## 2017-11-10 RX ADMIN — LOSARTAN POTASSIUM 100 MILLIGRAM(S): 100 TABLET, FILM COATED ORAL at 05:04

## 2017-11-10 RX ADMIN — Medication 250 MILLIGRAM(S): at 01:55

## 2017-11-10 RX ADMIN — HYDROMORPHONE HYDROCHLORIDE 2 MILLIGRAM(S): 2 INJECTION INTRAMUSCULAR; INTRAVENOUS; SUBCUTANEOUS at 05:25

## 2017-11-10 RX ADMIN — Medication 100 MILLIGRAM(S): at 05:04

## 2017-11-10 RX ADMIN — PIPERACILLIN AND TAZOBACTAM 25 GRAM(S): 4; .5 INJECTION, POWDER, LYOPHILIZED, FOR SOLUTION INTRAVENOUS at 14:29

## 2017-11-10 RX ADMIN — OXYCODONE HYDROCHLORIDE 10 MILLIGRAM(S): 5 TABLET ORAL at 17:37

## 2017-11-10 RX ADMIN — Medication 100 MILLIGRAM(S): at 22:31

## 2017-11-10 RX ADMIN — PANTOPRAZOLE SODIUM 40 MILLIGRAM(S): 20 TABLET, DELAYED RELEASE ORAL at 05:05

## 2017-11-10 RX ADMIN — HEPARIN SODIUM 5000 UNIT(S): 5000 INJECTION INTRAVENOUS; SUBCUTANEOUS at 22:34

## 2017-11-10 RX ADMIN — Medication 40 MILLIEQUIVALENT(S): at 15:29

## 2017-11-10 RX ADMIN — PIPERACILLIN AND TAZOBACTAM 25 GRAM(S): 4; .5 INJECTION, POWDER, LYOPHILIZED, FOR SOLUTION INTRAVENOUS at 22:31

## 2017-11-10 RX ADMIN — OXYCODONE HYDROCHLORIDE 5 MILLIGRAM(S): 5 TABLET ORAL at 12:45

## 2017-11-10 RX ADMIN — OXYCODONE HYDROCHLORIDE 10 MILLIGRAM(S): 5 TABLET ORAL at 10:15

## 2017-11-10 RX ADMIN — Medication 1 TABLET(S): at 12:01

## 2017-11-10 RX ADMIN — OXYCODONE HYDROCHLORIDE 10 MILLIGRAM(S): 5 TABLET ORAL at 11:03

## 2017-11-10 RX ADMIN — GABAPENTIN 100 MILLIGRAM(S): 400 CAPSULE ORAL at 15:28

## 2017-11-10 RX ADMIN — PANTOPRAZOLE SODIUM 40 MILLIGRAM(S): 20 TABLET, DELAYED RELEASE ORAL at 17:00

## 2017-11-10 RX ADMIN — GABAPENTIN 100 MILLIGRAM(S): 400 CAPSULE ORAL at 05:04

## 2017-11-10 RX ADMIN — OXYCODONE HYDROCHLORIDE 5 MILLIGRAM(S): 5 TABLET ORAL at 12:01

## 2017-11-10 RX ADMIN — PIPERACILLIN AND TAZOBACTAM 25 GRAM(S): 4; .5 INJECTION, POWDER, LYOPHILIZED, FOR SOLUTION INTRAVENOUS at 05:08

## 2017-11-10 RX ADMIN — Medication 100 MILLIGRAM(S): at 17:00

## 2017-11-10 RX ADMIN — OXYCODONE HYDROCHLORIDE 10 MILLIGRAM(S): 5 TABLET ORAL at 16:52

## 2017-11-10 RX ADMIN — WARFARIN SODIUM 2.5 MILLIGRAM(S): 2.5 TABLET ORAL at 22:31

## 2017-11-10 NOTE — PROGRESS NOTE ADULT - PROBLEM SELECTOR PLAN 4
s/p EGD -- no stigmata of hemorrhage  protonix 40 mg daily   monitor h/h, transfuse as needed  no overt/occult GI bleeding   colonoscopy as outpatient   monitor h/h when AC re started

## 2017-11-10 NOTE — PROGRESS NOTE ADULT - PROBLEM SELECTOR PLAN 4
controlled  continue cardizem and metoprolol with parameters  maintain HR < 110  plan to restart AC with coumadin  f/u cardiology (Dr. De Los Santos)

## 2017-11-10 NOTE — PROGRESS NOTE ADULT - ATTENDING COMMENTS
82F PMHx Afib on Coumadin, HLD, CVA, MVP, OA, HTN, PVD (s/p RLE bypassx3, most recently 2012), Neuropathy, GERD presents with ischemic ulcer of R foot 2nd digit. Incidentally found w/ nonobstructing hepatic duct stone.      Calculus of hepatic duct without obstruction.  Plan: s/p ercp with stone extraction of hepatic duct calculus seen on ct  as per surgery (Dr. Lamb) : No lap rox as high chance of conversion to open surgery and patient asymptomatic currently.       ·  Problem: Other acute osteomyelitis of right foot.  Plan: angiogram of RLE shows extensive disease and poor perfusion but given current presence of gangrene toe that is draining, s/p digital amputation   healing well  will resume coumadin without bridging   continue vanco/zosyn for now. f/u vanc trough  blood cx and urine cx NTD  vascular/ID/podiatry following.

## 2017-11-10 NOTE — PROGRESS NOTE ADULT - SUBJECTIVE AND OBJECTIVE BOX
INTERVAL HPI/OVERNIGHT EVENTS:    (+) right foot pain s/p amputation    HPI:  82F PMHx Afib on Coumadin, HLD, CVA, MVP, OA, HTN, PVD (s/p RLE bypassx3, most recently 2012), Neuropathy, GERD presents with pain and swelling to right second toe that started 1 month ago. Patient states foot became red, painful, and a small wound with a scab started forming on the top portion of her second toe. It became extremely difficult to bear weight on the foot so she went to see Dr. Isaacs at the wound center and he drained the wound at which point, yellow pus drained from the wound. He prescribed her an antibiotic (she does not recall which one). She denies any fevers, chills, chesst pain or shortness of breat. Admits to joint pain due to her RA.     In ED, pt's vital was wnl. WBC 8.2, Hct: 26.1, lactate 0.8, , CMP revealed elevated bilirubin of 1.5, remainder is wnl , INR: 2.26. CXR showed no acute pathology. X ray of Rt foot showed possible small periarticular erosion medial aspect head of the proximal phalanx which might represent infection/ osteomyelitis. Pt received 1 dose  of  vanco and 1 dose of zosyn and 1 bolus of NS, BCX, UCX was sent in  ED. (28 Oct 2017 13:42)    MEDICATIONS  (STANDING):  atorvastatin 40 milliGRAM(s) Oral at bedtime  diltiazem    Tablet 60 milliGRAM(s) Oral every 6 hours  docusate sodium 100 milliGRAM(s) Oral three times a day  gabapentin 100 milliGRAM(s) Oral three times a day  losartan 100 milliGRAM(s) Oral daily  metoprolol     tartrate 100 milliGRAM(s) Oral two times a day  multivitamin 1 Tablet(s) Oral daily  pantoprazole  Injectable 40 milliGRAM(s) IV Push every 12 hours  piperacillin/tazobactam IVPB. 3.375 Gram(s) IV Intermittent every 8 hours    MEDICATIONS  (PRN):  acetaminophen   Tablet. 650 milliGRAM(s) Oral every 6 hours PRN Mild Pain (1 - 3)  HYDROmorphone  Injectable 2 milliGRAM(s) IV Push every 4 hours PRN Severe Pain (7 - 10)  ondansetron Injectable 4 milliGRAM(s) IV Push once PRN Nausea and/or Vomiting  oxyCODONE    IR 5 milliGRAM(s) Oral every 4 hours PRN Moderate Pain (4 - 6)      Allergies    No Known Allergies    Intolerances          General:  No wt loss, fevers, chills, night sweats, fatigue,   Eyes:  Good vision, no reported pain  ENT:  No sore throat, pain, runny nose, dysphagia  CV:  No pain, palpitations, hypo/hypertension  Resp:  No dyspnea, cough, tachypnea, wheezing  GI:  No pain, No nausea, No vomiting, No diarrhea, No constipation, No weight loss, No fever, No pruritis, No rectal bleeding, No tarry stools, No dysphagia,  :  No pain, bleeding, incontinence, nocturia  Muscle: right foot pain  Neuro:  No weakness, tingling, memory problems  Psych:  No fatigue, insomnia, mood problems, depression  Endocrine:  No polyuria, polydipsia, cold/heat intolerance  Heme:  No petechiae, ecchymosis, easy bruisability  Skin:  No rash, tattoos, scars, edema      PHYSICAL EXAM:   Vital Signs:  Vital Signs Last 24 Hrs  T(C): 36.9 (10 Nov 2017 04:39), Max: 36.9 (10 Nov 2017 04:39)  T(F): 98.4 (10 Nov 2017 04:39), Max: 98.4 (10 Nov 2017 04:39)  HR: 77 (10 Nov 2017 04:39) (67 - 89)  BP: 157/77 (10 Nov 2017 04:39) (123/61 - 157/81)  BP(mean): --  RR: 18 (10 Nov 2017 04:39) (12 - 18)  SpO2: 95% (10 Nov 2017 04:39) (92% - 100%)  Daily Height in cm: 157.48 (09 Nov 2017 10:43)    Daily I&O's Summary    09 Nov 2017 07:01  -  10 Nov 2017 07:00  --------------------------------------------------------  IN: 750 mL / OUT: 0 mL / NET: 750 mL        GENERAL:  Appears stated age, well-groomed, well-nourished, no distress  HEENT:  NC/AT,  conjunctivae clear and pink, no thyromegaly, nodules, adenopathy, no JVD, sclera -anicteric  CHEST:  Full & symmetric excursion, no increased effort, breath sounds clear  HEART:  Regular rhythm, S1, S2, no murmur/rub/S3/S4, no abdominal bruit, no edema  ABDOMEN:  Soft, non-tender, non-distended, normoactive bowel sounds,  no masses ,no hepato-splenomegaly, no signs of chronic liver disease  EXTEREMITIES:  no cyanosis,clubbing or edema, right food dressing clean/dry  SKIN:  No rash/erythema/ecchymoses/petechiae/wounds/abscess/warm/dry  NEURO:  Alert, oriented, no asterixis, no tremor, no encephalopathy      LABS:                        9.0    13.2  )-----------( 274      ( 10 Nov 2017 07:26 )             29.3     11-10    142  |  108  |  19  ----------------------------<  93  3.4<L>   |  24  |  1.20    Ca    8.1<L>      10 Nov 2017 07:26      PT/INR - ( 09 Nov 2017 07:07 )   PT: 12.1 sec;   INR: 1.11 ratio             amylase   lipase  RADIOLOGY & ADDITIONAL TESTS:

## 2017-11-10 NOTE — PROGRESS NOTE ADULT - PROBLEM SELECTOR PLAN 2
angiogram of RLE shows extensive disease and poor perfusion but given current presence of gangrene toe that is draining, will have digital amputation.  s/p toe amputation - considering hypobaric therapy afterwards with potential for further amputation if inadequate healing. Awaiting final surgical path  continue vanco/zosyn for now. f/u vanc trough  blood cx and urine cx NTD  vascular/ID/podiatry following

## 2017-11-10 NOTE — PROGRESS NOTE ADULT - SUBJECTIVE AND OBJECTIVE BOX
[facility] - Dahiana Bañuelos, Evens, Toney, Terrie, Filiberto Laughlin  Office Number:  [phone]    Patient resting comfortably in bed in NAD.  Laying flat with no respiratory distress.  No complaints of chest pain, dyspnea, palpitations, PND, or orthopnea.  Tired this morning. s/p toe amputation    ROS: negative unless otherwise mentioned.    Telemetry: Not on tele     MEDICATIONS  (STANDING):  atorvastatin 40 milliGRAM(s) Oral at bedtime  diltiazem    Tablet 60 milliGRAM(s) Oral every 6 hours  docusate sodium 100 milliGRAM(s) Oral three times a day  gabapentin 100 milliGRAM(s) Oral three times a day  losartan 100 milliGRAM(s) Oral daily  metoprolol     tartrate 100 milliGRAM(s) Oral two times a day  multivitamin 1 Tablet(s) Oral daily  pantoprazole  Injectable 40 milliGRAM(s) IV Push every 12 hours  piperacillin/tazobactam IVPB. 3.375 Gram(s) IV Intermittent every 8 hours    MEDICATIONS  (PRN):  acetaminophen   Tablet. 650 milliGRAM(s) Oral every 6 hours PRN Mild Pain (1 - 3)  HYDROmorphone  Injectable 2 milliGRAM(s) IV Push every 4 hours PRN Severe Pain (7 - 10)  ondansetron Injectable 4 milliGRAM(s) IV Push once PRN Nausea and/or Vomiting  oxyCODONE    IR 5 milliGRAM(s) Oral every 4 hours PRN Moderate Pain (4 - 6)      Allergies    No Known Allergies    Intolerances        Vital Signs Last 24 Hrs  T(C): 36.9 (10 Nov 2017 04:39), Max: 36.9 (10 Nov 2017 04:39)  T(F): 98.4 (10 Nov 2017 04:39), Max: 98.4 (10 Nov 2017 04:39)  HR: 77 (10 Nov 2017 04:39) (67 - 89)  BP: 157/77 (10 Nov 2017 04:39) (123/61 - 157/81)  BP(mean): --  RR: 18 (10 Nov 2017 04:39) (12 - 18)  SpO2: 95% (10 Nov 2017 04:39) (92% - 100%)    I&O's Summary    09 Nov 2017 07:01  -  10 Nov 2017 07:00  --------------------------------------------------------  IN: 750 mL / OUT: 0 mL / NET: 750 mL        ON EXAM:    Constitutional: elderly female in NAD  HEENT:  EOMI, fixed dilated left pupil  Pulmonary: Non-labored, breath sounds are clear bilaterally, No wheezing, rales or rhonchi  Cardiovascular: Regular, S1 and S2.  No murmur.  No rubs, gallops or clicks  Gastrointestinal: Bowel Sounds present, soft, nontender.   Lymph: No peripheral edema.   Neurological: Alert, no focal deficits  Skin: No rashes. Cold lower extremities. Right foot currently wrapped  Psych:  Mood & affect appropriate      LABS: All Labs Reviewed:                        9.0    13.2  )-----------( 274      ( 10 Nov 2017 07:26 )             29.3                         9.1    10.0  )-----------( 270      ( 09 Nov 2017 07:07 )             29.8                         9.1    12.4  )-----------( 281      ( 08 Nov 2017 08:04 )             29.6     10 Nov 2017 07:26    142    |  108    |  19     ----------------------------<  93     3.4     |  24     |  1.20   09 Nov 2017 07:07    143    |  110    |  17     ----------------------------<  91     3.2     |  23     |  0.98   08 Nov 2017 08:04    143    |  112    |  13     ----------------------------<  98     3.5     |  22     |  1.00     Ca    8.1        10 Nov 2017 07:26  Ca    8.5        09 Nov 2017 07:07  Ca    8.2        08 Nov 2017 08:04    TPro  5.7    /  Alb  2.2    /  TBili  0.4    /  DBili  x      /  AST  10     /  ALT  15     /  AlkPhos  60     08 Nov 2017 08:04    PT/INR - ( 09 Nov 2017 07:07 )   PT: 12.1 sec;   INR: 1.11 ratio               Blood Culture: Organism --  Gram Stain Blood -- Gram Stain   No polymorphonuclear cells seen per low power field  No organisms seen per oil power field  Specimen Source .Tissue Other, 2nd metatarsal head rt foot  Culture-Blood --    Organism --  Gram Stain Blood -- Gram Stain   No polymorphonuclear cells seen per low power field  No organisms seen per oil power field  Specimen Source .Tissue Other, 2nd toe rt foot cultu  Culture-Blood --

## 2017-11-10 NOTE — PROGRESS NOTE ADULT - SUBJECTIVE AND OBJECTIVE BOX
Patient is a 82y old  Female who presents with a chief complaint of weakness and pain both legs (28 Oct 2017 20:23)      INTERVAL HPI/OVERNIGHT EVENTS:  Patient Had no overnight events. Patient was seen today morning at the bedside. Patient admits to pain from her procedure that is relieved by the pain medications. Denies fever, sob, chest pain, nausea, vomiting, diarrhea and constipation. Is tolerating diet without issue.    Vitals  T(C): 36.9 (11-10-17 @ 04:39), Max: 36.9 (11-10-17 @ 04:39)  HR: 77 (11-10-17 @ 04:39) (68 - 89)  BP: 157/77 (11-10-17 @ 04:39) (123/61 - 157/81)  RR: 18 (11-10-17 @ 04:39) (16 - 18)  SpO2: 95% (11-10-17 @ 04:39) (92% - 100%)  Wt(kg): --  I&O's Summary    09 Nov 2017 07:01  -  10 Nov 2017 07:00  --------------------------------------------------------  IN: 750 mL / OUT: 0 mL / NET: 750 mL      REVIEW OF SYSTEMS:  CONSTITUTIONAL: denies fever, chills, fatigue, weakness  HEENT: denies blurred vision, sore throat  SKIN: denies new lesions, rash  CARDIOVASCULAR: denies chest pain, chest pressure, palpitations  RESPIRATORY: denies shortness of breath, sputum production  GASTROINTESTINAL: denies nausea, vomiting, diarrhea, abdominal pain, dyschezia  GENITOURINARY: denies dysuria, discharge  NEUROLOGICAL: denies numbness, headache, focal weakness  MUSCULOSKELETAL: denies new joint pain, muscle aches  PSYCH: denies anxiety depression at this time    PHYSICAL EXAM:  GENERAL: NAD,   HEAD:  Atraumatic, Normocephalic  EYES: EOMI, fixed dilated pupil on the left  NERVOUS SYSTEM:  Alert & Oriented X3, poor concentration; Motor Strength 5/5 B/L upper and lower extremities;   CHEST/LUNG: Clear to percussion bilaterally; No rales, rhonchi, wheezing, or rubs  HEART: regular rate and rhythm; No murmurs, rubs, or gallops  ABDOMEN: Soft, Nontender, Nondistended; Bowel sounds present  EXTREMITIES:  trace Peripheral Pulses in bilateral lower extremities, b/l feet are warm, tender to palpation on the right foot at the site of her amputation  SKIN: right foot, currently wrapped with pearl. no drainage noted at site    MEDICATIONS  (STANDING):  atorvastatin 40 milliGRAM(s) Oral at bedtime  diltiazem    Tablet 60 milliGRAM(s) Oral every 6 hours  docusate sodium 100 milliGRAM(s) Oral three times a day  gabapentin 100 milliGRAM(s) Oral three times a day  heparin  Injectable 5000 Unit(s) SubCutaneous every 8 hours  losartan 100 milliGRAM(s) Oral daily  metoprolol     tartrate 100 milliGRAM(s) Oral two times a day  multivitamin 1 Tablet(s) Oral daily  pantoprazole  Injectable 40 milliGRAM(s) IV Push every 12 hours  piperacillin/tazobactam IVPB. 3.375 Gram(s) IV Intermittent every 8 hours  potassium chloride    Tablet ER 40 milliEquivalent(s) Oral once  vancomycin  IVPB 1000 milliGRAM(s) IV Intermittent every 24 hours    MEDICATIONS  (PRN):  acetaminophen   Tablet. 650 milliGRAM(s) Oral every 6 hours PRN Mild Pain (1 - 3)  ondansetron Injectable 4 milliGRAM(s) IV Push once PRN Nausea and/or Vomiting  oxyCODONE    IR 10 milliGRAM(s) Oral every 6 hours PRN Severe Pain (7 - 10)  oxyCODONE    IR 5 milliGRAM(s) Oral every 4 hours PRN Moderate Pain (4 - 6)      LABS:                        9.0    13.2  )-----------( 274      ( 10 Nov 2017 07:26 )             29.3     11-10    142  |  108  |  19  ----------------------------<  93  3.4<L>   |  24  |  1.20    Ca    8.1<L>      10 Nov 2017 07:26      PT/INR - ( 09 Nov 2017 07:07 )   PT: 12.1 sec;   INR: 1.11 ratio

## 2017-11-10 NOTE — PROGRESS NOTE ADULT - PROBLEM SELECTOR PLAN 3
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 continue to monitor H/h  continue to transfuse if hb< 7  continue protonix 40 mg BID IV (to be converted to po protonix qd at discharge)  plan to restart AC   s/p endoscopy showing gastritis, hiatal hernia, with biopsy of the antrum and the body. Tortuous esophagus. Biopsy negative for h. pylori

## 2017-11-10 NOTE — PROGRESS NOTE ADULT - SUBJECTIVE AND OBJECTIVE BOX
CARITO CONSTANTINO is a 82yFemale , patient examined and chart reviewed. Patient seen and examined today being followed for gangrene right 2nd toe       INTERVAL HPI/ OVERNIGHT EVENTS: Events notes, she is POD # 1, s/p resection of the right 2nd toe secondary to wet gangrene. 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  Osteoarthritis  Afib  PVD (peripheral vascular disease)  Neuropathy: (Right lower leg)  H/O cerebral aneurysm repair: brain clips  CVA (cerebral vascular accident): (&quot;Mini-stroke&quot;,1990&#x27;s)  Rheumatoid arthritis  Hyperlipidemia  Hypertension  S/P cataract surgery: (Left eye)  Elective surgery: (&quot;Twisted bowel&quot;, 2014)  Elective surgery: (Exision of cyst on liver, 1985)  S/P ORIF (open reduction internal fixation) fracture: Left hip fx (2012) &amp; R hip fx (2013)  H/O cerebral aneurysm repair: Brain clips (1978(  Renal stone: Cysto stent placement 10/1/2014  PVD (peripheral vascular disease): s/p RLE bypass x 3, most recent 3/2012 Right external iliac to PT bypass w/ PTFE (2012)  S/P FRED (total abdominal hysterectomy): (1987, Hx of &quot;ovarian cancer?&quot;)      For details regarding the patient's social history, family history, and other miscellaneous elements, please refer the initial infectious diseases consultation and/or the admitting history and physical examination for this admission.    ROS:  CONSTITUTIONAL:  Negative fever or chills, feels well, good appetite  EYES:  Negative  blurry vision or double vision  CARDIOVASCULAR:  Negative for chest pain or palpitations  RESPIRATORY:  Negative for cough, wheezing, or SOB   GASTROINTESTINAL:  Negative for nausea, vomiting, diarrhea, constipation, or abdominal pain  GENITOURINARY:  Negative frequency, urgency or dysuria  NEUROLOGIC:  No headache, confusion, dizziness, lightheadedness  All other systems were reviewed and are negative         Current inpatient medications :    ANTIBIOTICS/RELEVANT:  piperacillin/tazobactam IVPB. 3.375 Gram(s) IV Intermittent every 8 hours      acetaminophen   Tablet. 650 milliGRAM(s) Oral every 6 hours PRN  atorvastatin 40 milliGRAM(s) Oral at bedtime  diltiazem    Tablet 60 milliGRAM(s) Oral every 6 hours  docusate sodium 100 milliGRAM(s) Oral three times a day  gabapentin 100 milliGRAM(s) Oral three times a day  HYDROmorphone  Injectable 2 milliGRAM(s) IV Push every 4 hours PRN  losartan 100 milliGRAM(s) Oral daily  metoprolol     tartrate 100 milliGRAM(s) Oral two times a day  multivitamin 1 Tablet(s) Oral daily  ondansetron Injectable 4 milliGRAM(s) IV Push once PRN  oxyCODONE    IR 5 milliGRAM(s) Oral every 4 hours PRN  pantoprazole  Injectable 40 milliGRAM(s) IV Push every 12 hours      Objective:    11-09 @ 07:01  -  11-10 @ 07:00  --------------------------------------------------------  IN: 750 mL / OUT: 0 mL / NET: 750 mL      T(C): 36.9 (11-10-17 @ 04:39), Max: 36.9 (11-10-17 @ 04:39)  HR: 77 (11-10-17 @ 04:39) (67 - 89)  BP: 157/77 (11-10-17 @ 04:39) (123/61 - 157/81)  RR: 18 (11-10-17 @ 04:39) (12 - 18)  SpO2: 95% (11-10-17 @ 04:39) (92% - 100%)  Wt(kg): --      Physical Exam:  General: well developed well nourished, in no acute distress  Eyes: sclera anicteric, pupils equal and reactive to light  ENMT: buccal mucosa moist, pharynx not injected  Neck: supple, trachea midline  Lungs: clear, no wheeze/rhonchi  Cardiovascular: regular rate and rhythm, S1 S2  Abdomen: soft, nontender, no organomegaly present, bowel sounds normal  Neurological: alert and oriented x3, Cranial Nerves II-XII grossly intact  Skin: no increased ecchymosis/petechiae/purpura  Lymph Nodes: no palpable cervical/supraclavicular lymph nodes enlargements  Extremities: no cyanosis/clubbing/edema            LABS:                          9.0    13.2  )-----------( 274      ( 10 Nov 2017 07:26 )             29.3       11-10    142  |  108  |  19  ----------------------------<  93  3.4<L>   |  24  |  1.20    Ca    8.1<L>      10 Nov 2017 07:26        PT/INR - ( 09 Nov 2017 07:07 )   PT: 12.1 sec;   INR: 1.11 ratio               RECENT CULTURES:    Culture - Tissue with Gram Stain (collected 09 Nov 2017 20:44)  Source: .Tissue Other, 2nd metatarsal head rt foot  Gram Stain (09 Nov 2017 23:43):    No polymorphonuclear cells seen per low power field    No organisms seen per oil power field    Culture - Tissue with Gram Stain (collected 09 Nov 2017 20:43)  Source: .Tissue Other, 2nd toe rt foot cultu  Gram Stain (09 Nov 2017 21:52):    No polymorphonuclear cells seen per low power field    No organisms seen per oil power field        RADIOLOGY & ADDITIONAL STUDIES:  Xray Foot AP + Lateral + Oblique, Right (11.09.17 @ 13:38) >    Comparison: Right forefoot 10/20/2017.    There has been interval resection of the second toe through the head of   the metatarsal.  Diffuse osteopenia is present.    The alignment at the tarsometatarsal joints remains within normal limits.    Surgical clips are noted at the level ofthe posterior soft tissues of   the distal tibia.      Assessment :    Assessment:  82 y.o. female with multiple medical problems Afib on Coumadin, HLD, CVA, MVP, OA, HTN, PVD (s/p RLE bypassx3, most recently 2012), Neuropathy, GERD  presents with  ischemic right foot and leg coupled with gangrene of right 2nd. toe.  She has multiple failed bypasses of right leg. 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 Her pain is out of proportion to the toe infection and likely is related to ischemic pain right leg .    She is scheduled for angiogram of right leg as recommended by vascular surgery , podiatry still feels the gangrenous toe should be amputated and then try HBOT     She has fecal impaction , most likely cause of abdominal pain.     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 She has severe PVD with occluded grafts , she is s/p resection of the right   2nd toe thru the metatarsal head     Plan :   - will continue with  Vancomycin and Zosyn  pending cs , if no staph dc Vancomycin . it was not renewed post op   - wound care as per podiatry   - Discussed with vascular surgery and Podiatry  by phone in detail   - trend renal function and CBC  - overall prognosis is poor. Discussed with Dr. Casiano     Continue with present regime .  Appropriate use of antibiotics and adverse effects reviewed.     I have discussed the above plan of care with patient and  family in detail. They expressed understanding of the  treatment plan . Risks, benefits and alternatives discussed in detail. I have asked if they have any questions or concerns and appropriately addressed them to the best of my ability .    > 35 minutes were spent in direct patient care reviewing notes, medications ,labs data/ imaging , discussion with multidisciplinary team.    Thank you for allowing me to participate in care of your patient .    Judi Coyne MD  [phone]

## 2017-11-10 NOTE — PROGRESS NOTE ADULT - ASSESSMENT
83 yo female with Right 2nd digit cellulitis and osteomyelitis, s/p R 2nd toe amp and 2nd met head resection on 11/9/17      Plan:   Pt seen and eval with Dr Temple  Surgical site looks fine at this time without any ischemic changes  However, there is no blood or drainage soaked on dressing  Previous dressing removed   Aquacel and DSD applied to right foot  Will monitor healing     Cont med management    Pod cont f/u while pt in house

## 2017-11-10 NOTE — PROGRESS NOTE ADULT - SUBJECTIVE AND OBJECTIVE BOX
The patient was interviewed and evaluated  82y Female    T(C): 36.9 (11-10-17 @ 04:39), Max: 36.9 (11-10-17 @ 04:39)  HR: 77 (11-10-17 @ 04:39) (67 - 89)  BP: 157/77 (11-10-17 @ 04:39) (123/61 - 157/81)  RR: 18 (11-10-17 @ 04:39) (12 - 18)  SpO2: 95% (11-10-17 @ 04:39) (92% - 100%)  Wt(kg): --    Pt seen, doing well, no anesthesia complications or complaints noted or reported.   No Nausea    All questions answered    No additional recommendations.     Only c/o foot pain. Analgesics prn encouraged

## 2017-11-10 NOTE — PROGRESS NOTE ADULT - SUBJECTIVE AND OBJECTIVE BOX
81 yo Female with PMH of HLD, CVA, MVP, OA, HTN, PVD, Neuropathy, GERD with chief complaint of Right 2nd digit cellulitis. Pt admits throbbing pain last night to right 2nd digit. Pt denies N/V/F/C at this time.     H&P from ED: "82F PMHx Afib on Coumadin, HLD, CVA, MVP, OA, HTN, PVD (s/p RLE bypassx3, most recently 2012), Neuropathy, GERD presents with pain and swelling to right second toe that started 1 month ago. Patient states foot became red, painful, and a small wound with a scab started forming on the top portion of her second toe. It became extremely difficult to bear weight on the foot so she went to see Dr. Isaacs at the wound center and he drained the wound at which point, yellow pus drained from the wound. He prescribed her an antibiotic (she does not recall which one). She denies any fevers, chills, chesst pain or shortness of breat. Admits to joint pain due to her RA."    Vital Signs Last 24 Hrs  T(C): 36.9 (10 Nov 2017 04:39), Max: 36.9 (10 Nov 2017 04:39)  T(F): 98.4 (10 Nov 2017 04:39), Max: 98.4 (10 Nov 2017 04:39)  HR: 77 (10 Nov 2017 04:39) (68 - 89)  BP: 157/77 (10 Nov 2017 04:39) (123/61 - 157/81)  BP(mean): --  RR: 18 (10 Nov 2017 04:39) (16 - 18)  SpO2: 95% (10 Nov 2017 04:39) (92% - 97%)    Lower Extremity Examination  Vasc: DP, PT non palpable, CFT delayed to all digits, absent pedal hair bilat, TG: WNL  Neuro: gross protective sensation intact bilat  Derm: surgical site suture intact without wound dehiscences, no malodor, skin is thin, atrophic and shiny, no ischemic changes noted  MSK: pain on palpation to right forefoot                          9.0    13.2  )-----------( 274      ( 10 Nov 2017 07:26 )             29.3   11-10    142  |  108  |  19  ----------------------------<  93  3.4<L>   |  24  |  1.20    Ca    8.1<L>      10 Nov 2017 07:26

## 2017-11-11 LAB
-  AMPICILLIN/SULBACTAM: SIGNIFICANT CHANGE UP
-  CEFAZOLIN: SIGNIFICANT CHANGE UP
-  CIPROFLOXACIN: SIGNIFICANT CHANGE UP
-  CLINDAMYCIN: SIGNIFICANT CHANGE UP
-  ERYTHROMYCIN: SIGNIFICANT CHANGE UP
-  GENTAMICIN: SIGNIFICANT CHANGE UP
-  LEVOFLOXACIN: SIGNIFICANT CHANGE UP
-  MOXIFLOXACIN(AEROBIC): SIGNIFICANT CHANGE UP
-  OXACILLIN: SIGNIFICANT CHANGE UP
-  PENICILLIN: SIGNIFICANT CHANGE UP
-  RIFAMPIN: SIGNIFICANT CHANGE UP
-  TETRACYCLINE: SIGNIFICANT CHANGE UP
-  TRIMETHOPRIM/SULFAMETHOXAZOLE: SIGNIFICANT CHANGE UP
-  VANCOMYCIN: SIGNIFICANT CHANGE UP
ANION GAP SERPL CALC-SCNC: 8 MMOL/L — SIGNIFICANT CHANGE UP (ref 5–17)
BASOPHILS # BLD AUTO: 0.2 K/UL — SIGNIFICANT CHANGE UP (ref 0–0.2)
BASOPHILS NFR BLD AUTO: 2.3 % — HIGH (ref 0–2)
BUN SERPL-MCNC: 20 MG/DL — SIGNIFICANT CHANGE UP (ref 7–23)
CALCIUM SERPL-MCNC: 8.3 MG/DL — LOW (ref 8.5–10.1)
CHLORIDE SERPL-SCNC: 105 MMOL/L — SIGNIFICANT CHANGE UP (ref 96–108)
CO2 SERPL-SCNC: 27 MMOL/L — SIGNIFICANT CHANGE UP (ref 22–31)
CREAT SERPL-MCNC: 1.2 MG/DL — SIGNIFICANT CHANGE UP (ref 0.5–1.3)
EOSINOPHIL # BLD AUTO: 0.4 K/UL — SIGNIFICANT CHANGE UP (ref 0–0.5)
EOSINOPHIL NFR BLD AUTO: 4.4 % — SIGNIFICANT CHANGE UP (ref 0–6)
GLUCOSE SERPL-MCNC: 103 MG/DL — HIGH (ref 70–99)
HCT VFR BLD CALC: 30.8 % — LOW (ref 34.5–45)
HGB BLD-MCNC: 9.5 G/DL — LOW (ref 11.5–15.5)
INR BLD: 1.16 RATIO — SIGNIFICANT CHANGE UP (ref 0.88–1.16)
LYMPHOCYTES # BLD AUTO: 1.2 K/UL — SIGNIFICANT CHANGE UP (ref 1–3.3)
LYMPHOCYTES # BLD AUTO: 13.1 % — SIGNIFICANT CHANGE UP (ref 13–44)
MCHC RBC-ENTMCNC: 28.5 PG — SIGNIFICANT CHANGE UP (ref 27–34)
MCHC RBC-ENTMCNC: 30.7 GM/DL — LOW (ref 32–36)
MCV RBC AUTO: 92.9 FL — SIGNIFICANT CHANGE UP (ref 80–100)
METHOD TYPE: SIGNIFICANT CHANGE UP
MONOCYTES # BLD AUTO: 0.9 K/UL — SIGNIFICANT CHANGE UP (ref 0–0.9)
MONOCYTES NFR BLD AUTO: 10.1 % — HIGH (ref 1–9)
NEUTROPHILS # BLD AUTO: 6.5 K/UL — SIGNIFICANT CHANGE UP (ref 1.8–7.4)
NEUTROPHILS NFR BLD AUTO: 70.1 % — SIGNIFICANT CHANGE UP (ref 43–77)
PLATELET # BLD AUTO: 287 K/UL — SIGNIFICANT CHANGE UP (ref 150–400)
POTASSIUM SERPL-MCNC: 3.5 MMOL/L — SIGNIFICANT CHANGE UP (ref 3.5–5.3)
POTASSIUM SERPL-SCNC: 3.5 MMOL/L — SIGNIFICANT CHANGE UP (ref 3.5–5.3)
PROTHROM AB SERPL-ACNC: 12.7 SEC — SIGNIFICANT CHANGE UP (ref 9.8–12.7)
RBC # BLD: 3.31 M/UL — LOW (ref 3.8–5.2)
RBC # FLD: 15 % — HIGH (ref 10.3–14.5)
SODIUM SERPL-SCNC: 140 MMOL/L — SIGNIFICANT CHANGE UP (ref 135–145)
VANCOMYCIN TROUGH SERPL-MCNC: 18.1 UG/ML — SIGNIFICANT CHANGE UP (ref 10–20)
WBC # BLD: 9.3 K/UL — SIGNIFICANT CHANGE UP (ref 3.8–10.5)
WBC # FLD AUTO: 9.3 K/UL — SIGNIFICANT CHANGE UP (ref 3.8–10.5)

## 2017-11-11 PROCEDURE — 99233 SBSQ HOSP IP/OBS HIGH 50: CPT

## 2017-11-11 RX ORDER — WARFARIN SODIUM 2.5 MG/1
3 TABLET ORAL ONCE
Qty: 0 | Refills: 0 | Status: COMPLETED | OUTPATIENT
Start: 2017-11-11 | End: 2017-11-11

## 2017-11-11 RX ADMIN — PIPERACILLIN AND TAZOBACTAM 25 GRAM(S): 4; .5 INJECTION, POWDER, LYOPHILIZED, FOR SOLUTION INTRAVENOUS at 05:35

## 2017-11-11 RX ADMIN — Medication 1 TABLET(S): at 11:14

## 2017-11-11 RX ADMIN — Medication 100 MILLIGRAM(S): at 22:31

## 2017-11-11 RX ADMIN — GABAPENTIN 100 MILLIGRAM(S): 400 CAPSULE ORAL at 22:31

## 2017-11-11 RX ADMIN — Medication 100 MILLIGRAM(S): at 17:11

## 2017-11-11 RX ADMIN — OXYCODONE HYDROCHLORIDE 10 MILLIGRAM(S): 5 TABLET ORAL at 06:58

## 2017-11-11 RX ADMIN — OXYCODONE HYDROCHLORIDE 10 MILLIGRAM(S): 5 TABLET ORAL at 00:04

## 2017-11-11 RX ADMIN — Medication 100 MILLIGRAM(S): at 05:35

## 2017-11-11 RX ADMIN — OXYCODONE HYDROCHLORIDE 10 MILLIGRAM(S): 5 TABLET ORAL at 22:33

## 2017-11-11 RX ADMIN — OXYCODONE HYDROCHLORIDE 10 MILLIGRAM(S): 5 TABLET ORAL at 23:57

## 2017-11-11 RX ADMIN — OXYCODONE HYDROCHLORIDE 10 MILLIGRAM(S): 5 TABLET ORAL at 13:30

## 2017-11-11 RX ADMIN — OXYCODONE HYDROCHLORIDE 10 MILLIGRAM(S): 5 TABLET ORAL at 07:30

## 2017-11-11 RX ADMIN — PANTOPRAZOLE SODIUM 40 MILLIGRAM(S): 20 TABLET, DELAYED RELEASE ORAL at 05:34

## 2017-11-11 RX ADMIN — Medication 250 MILLIGRAM(S): at 05:52

## 2017-11-11 RX ADMIN — HEPARIN SODIUM 5000 UNIT(S): 5000 INJECTION INTRAVENOUS; SUBCUTANEOUS at 14:05

## 2017-11-11 RX ADMIN — PIPERACILLIN AND TAZOBACTAM 25 GRAM(S): 4; .5 INJECTION, POWDER, LYOPHILIZED, FOR SOLUTION INTRAVENOUS at 14:14

## 2017-11-11 RX ADMIN — OXYCODONE HYDROCHLORIDE 10 MILLIGRAM(S): 5 TABLET ORAL at 00:34

## 2017-11-11 RX ADMIN — GABAPENTIN 100 MILLIGRAM(S): 400 CAPSULE ORAL at 05:34

## 2017-11-11 RX ADMIN — PANTOPRAZOLE SODIUM 40 MILLIGRAM(S): 20 TABLET, DELAYED RELEASE ORAL at 17:11

## 2017-11-11 RX ADMIN — WARFARIN SODIUM 3 MILLIGRAM(S): 2.5 TABLET ORAL at 22:31

## 2017-11-11 RX ADMIN — LOSARTAN POTASSIUM 100 MILLIGRAM(S): 100 TABLET, FILM COATED ORAL at 05:35

## 2017-11-11 RX ADMIN — HEPARIN SODIUM 5000 UNIT(S): 5000 INJECTION INTRAVENOUS; SUBCUTANEOUS at 05:34

## 2017-11-11 RX ADMIN — ATORVASTATIN CALCIUM 40 MILLIGRAM(S): 80 TABLET, FILM COATED ORAL at 22:31

## 2017-11-11 RX ADMIN — Medication 100 MILLIGRAM(S): at 14:05

## 2017-11-11 RX ADMIN — GABAPENTIN 100 MILLIGRAM(S): 400 CAPSULE ORAL at 14:05

## 2017-11-11 RX ADMIN — OXYCODONE HYDROCHLORIDE 10 MILLIGRAM(S): 5 TABLET ORAL at 12:39

## 2017-11-11 NOTE — PROGRESS NOTE ADULT - ASSESSMENT
81 yo female with Right 2nd digit cellulitis and osteomyelitis, s/p R 2nd toe amp and 2nd met head resection on 11/9/17      Plan:   Pt seen and eval with Dr Temple  Surgical site looks fine at this time without any ischemic changes  However, there is no blood or drainage soaked on dressing  Previous dressing removed   Aquacel and DSD applied to right foot  Will monitor healing     Cont med management    Pod cont f/u while pt in house

## 2017-11-11 NOTE — PROGRESS NOTE ADULT - SUBJECTIVE AND OBJECTIVE BOX
Patient is a 82y old  Female who presents with a chief complaint of weakness and pain both legs (28 Oct 2017 20:23)      INTERVAL HPI: Pt seen and examined. State she is feeling better and pain is better controlled.     OVERNIGHT EVENTS: none noted  T(F): 98.3 (11-11-17 @ 20:07), Max: 98.3 (11-11-17 @ 05:19)  HR: 77 (11-11-17 @ 20:07) (76 - 81)  BP: 136/79 (11-11-17 @ 20:07) (119/71 - 136/79)  RR: 16 (11-11-17 @ 20:07) (16 - 18)  SpO2: 95% (11-11-17 @ 20:07) (94% - 97%)  I&O's Summary    11 Nov 2017 07:01  -  11 Nov 2017 21:36  --------------------------------------------------------  IN: 100 mL / OUT: 0 mL / NET: 100 mL        REVIEW OF SYSTEMS: 12 systems reviewed noncontributory    PHYSICAL EXAM:  GENERAL: NAD, elder  HEAD:  Atraumatic, Normocephalic  EYES: EOMI, PERRLA, conjunctiva and sclera clear  ENMT: No tonsillar erythema, exudates, or enlargement; Moist mucous membranes, Good dentition, No lesions  NECK: Supple, No JVD  NERVOUS SYSTEM:  Alert & Oriented X3, Good concentration; Motor Strength 3/5 B/L upper and lower extremities; DTRs 2+ intact and symmetric  CHEST/LUNG: Clear to percussion bilaterally; No rales, rhonchi, wheezing, or rubs  HEART: Regular rate and rhythm; No murmurs, rubs, or gallops  ABDOMEN: Soft, Nontender, Nondistended; Bowel sounds present  EXTREMITIES:  2+ Peripheral Pulses, RLE dressing c/d/i      LABS:                        9.5    9.3   )-----------( 287      ( 11 Nov 2017 07:15 )             30.8     11-11    140  |  105  |  20  ----------------------------<  103<H>  3.5   |  27  |  1.20    Ca    8.3<L>      11 Nov 2017 07:15      PT/INR - ( 11 Nov 2017 07:15 )   PT: 12.7 sec;   INR: 1.16 ratio             CAPILLARY BLOOD GLUCOSE          11-09 @ 20:44   Few Staphylococcus aureus  --  Staphylococcus aureus  11-09 @ 20:43   Growth in fluid media only Gram positive cocci in pairs  --  --          MEDICATIONS  (STANDING):  atorvastatin 40 milliGRAM(s) Oral at bedtime  diltiazem    Tablet 60 milliGRAM(s) Oral every 6 hours  docusate sodium 100 milliGRAM(s) Oral three times a day  gabapentin 100 milliGRAM(s) Oral three times a day  losartan 100 milliGRAM(s) Oral daily  metoprolol     tartrate 100 milliGRAM(s) Oral two times a day  multivitamin 1 Tablet(s) Oral daily  pantoprazole  Injectable 40 milliGRAM(s) IV Push every 12 hours  vancomycin  IVPB 1000 milliGRAM(s) IV Intermittent every 24 hours  warfarin 3 milliGRAM(s) Oral once    MEDICATIONS  (PRN):  acetaminophen   Tablet. 650 milliGRAM(s) Oral every 6 hours PRN Mild Pain (1 - 3)  ondansetron Injectable 4 milliGRAM(s) IV Push once PRN Nausea and/or Vomiting  oxyCODONE    IR 10 milliGRAM(s) Oral every 6 hours PRN Severe Pain (7 - 10)  oxyCODONE    IR 5 milliGRAM(s) Oral every 4 hours PRN Moderate Pain (4 - 6)

## 2017-11-11 NOTE — PROGRESS NOTE ADULT - ASSESSMENT
82F PMHx Afib on Coumadin, HLD, CVA, MVP, OA, HTN, PVD (s/p RLE bypassx3, most recently 2012), Neuropathy, GERD presents with pain and swelling to right second toe a/w RLE cellulitis, r/o osteomyelitis. Found to have symptomatic anemia     -Patient still in SR, continue Cardizem 60mg q6 and metoprolol 100 BID. HR and BP acceptable.  -No evidence of acute ischemia or meaningful volume overload  -Extensive history of PVD and stenotic valvular disease. Now with gangrene. s/p toe amputation. Seems to have tolerated procedure well.  -Recommend pain control  -Monitor H/H, currently stable, transfuse as needed.   -Resume coumadin with goal INR 2-3. No bridging is necessary  -TTE (Nov 4/17): Technically limited study. Normal left ventricular size and systolic function, estimated LVEF of 65%. Grade 1 diastolic dysfunction. Normal biatrial size. The mitral valve is structurally normal, 1+ MR.  -Monitor electrolytes, keep K>4, Mg>2  -Will continue to follow

## 2017-11-11 NOTE — PROGRESS NOTE ADULT - SUBJECTIVE AND OBJECTIVE BOX
CARITO CONSTANTINO is a 82yFemale , patient examined and chart reviewed. 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  Osteoarthritis  Afib  PVD (peripheral vascular disease)  Neuropathy: (Right lower leg)  H/O cerebral aneurysm repair: brain clips  CVA (cerebral vascular accident): (&quot;Mini-stroke&quot;,1990&#x27;s)  Rheumatoid arthritis  Hyperlipidemia  Hypertension  S/P cataract surgery: (Left eye)  Elective surgery: (&quot;Twisted bowel&quot;, 2014)  Elective surgery: (Exision of cyst on liver, 1985)  S/P ORIF (open reduction internal fixation) fracture: Left hip fx (2012) &amp; R hip fx (2013)  H/O cerebral aneurysm repair: Brain clips (1978(  Renal stone: Cysto stent placement 10/1/2014  PVD (peripheral vascular disease): s/p RLE bypass x 3, most recent 3/2012 Right external iliac to PT bypass w/ PTFE (2012)  S/P FRED (total abdominal hysterectomy): (1987, Hx of &quot;ovarian cancer?&quot;)      For details regarding the patient's social history, family history, and other miscellaneous elements, please refer the initial infectious diseases consultation and/or the admitting history and physical examination for this admission.    ROS:  CONSTITUTIONAL:  Negative fever or chills, feels well, good appetite  EYES:  Negative  blurry vision or double vision  CARDIOVASCULAR:  Negative for chest pain or palpitations  RESPIRATORY:  Negative for cough, wheezing, or SOB   GASTROINTESTINAL:  Negative for nausea, vomiting, diarrhea, constipation, or abdominal pain  GENITOURINARY:  Negative frequency, urgency or dysuria  NEUROLOGIC:  No headache, confusion, dizziness, lightheadedness  All other systems were reviewed and are negative         Current inpatient medications :    ANTIBIOTICS/RELEVANT:  piperacillin/tazobactam IVPB. 3.375 Gram(s) IV Intermittent every 8 hours  vancomycin  IVPB 1000 milliGRAM(s) IV Intermittent every 24 hours      acetaminophen   Tablet. 650 milliGRAM(s) Oral every 6 hours PRN  atorvastatin 40 milliGRAM(s) Oral at bedtime  diltiazem    Tablet 60 milliGRAM(s) Oral every 6 hours  docusate sodium 100 milliGRAM(s) Oral three times a day  gabapentin 100 milliGRAM(s) Oral three times a day  heparin  Injectable 5000 Unit(s) SubCutaneous every 8 hours  losartan 100 milliGRAM(s) Oral daily  metoprolol     tartrate 100 milliGRAM(s) Oral two times a day  multivitamin 1 Tablet(s) Oral daily  ondansetron Injectable 4 milliGRAM(s) IV Push once PRN  oxyCODONE    IR 10 milliGRAM(s) Oral every 6 hours PRN  oxyCODONE    IR 5 milliGRAM(s) Oral every 4 hours PRN  pantoprazole  Injectable 40 milliGRAM(s) IV Push every 12 hours      Objective:    T(C): 36.8 (11-11-17 @ 13:27), Max: 36.8 (11-10-17 @ 19:52)  HR: 80 (11-11-17 @ 17:08) (68 - 81)  BP: 122/66 (11-11-17 @ 17:08) (118/73 - 128/80)  RR: 18 (11-11-17 @ 17:08) (18 - 18)  SpO2: 94% (11-11-17 @ 17:08) (94% - 97%)  Wt(kg): --      Physical Exam:  General: well developed well nourished, in no acute distress  Eyes: sclera anicteric, pupils equal and reactive to light  ENMT: buccal mucosa moist, pharynx not injected  Neck: supple, trachea midline  Lungs: clear, no wheeze/rhonchi  Cardiovascular: regular rate and rhythm, S1 S2  Abdomen: soft, nontender, no organomegaly present, bowel sounds normal  Neurological: alert and oriented x3, Cranial Nerves II-XII grossly intact  Skin: no increased ecchymosis/petechiae/purpura  Lymph Nodes: no palpable cervical/supraclavicular lymph nodes enlargements  Extremities: no cyanosis/clubbing, dressing in place left foot , wound as per podiatry -  surgical site suture intact without wound dehiscences, no malodor, skin is thin, atrophic and shiny, no ischemic changes noted        LABS:                          9.5    9.3   )-----------( 287      ( 11 Nov 2017 07:15 )             30.8       11-11    140  |  105  |  20  ----------------------------<  103<H>  3.5   |  27  |  1.20    Ca    8.3<L>      11 Nov 2017 07:15        PT/INR - ( 11 Nov 2017 07:15 )   PT: 12.7 sec;   INR: 1.16 ratio             Vancomycin Level, Trough: 18.1 ug/mL (11-11 @ 07:16)          RECENT CULTURES:    Culture - Tissue with Gram Stain (collected 09 Nov 2017 20:44)  Source: .Tissue Other, 2nd metatarsal head rt foot  Gram Stain (09 Nov 2017 23:43):    No polymorphonuclear cells seen per low power field    No organisms seen per oil power field  Preliminary Report (10 Nov 2017 18:01):    Few Staphylococcus aureus    Culture - Tissue with Gram Stain (collected 09 Nov 2017 20:43)  Source: .Tissue Other, 2nd toe rt foot cultu  Gram Stain (09 Nov 2017 21:52):    No polymorphonuclear cells seen per low power field    No organisms seen per oil power field  Preliminary Report (10 Nov 2017 20:45):    Growth in fluid media only Gram positive cocci in pairs    Assessment:  82 y.o. female with multiple medical problems Afib on Coumadin, HLD, CVA, MVP, OA, HTN, PVD (s/p RLE bypassx3, most recently 2012), Neuropathy, GERD  presents with  ischemic right foot and leg coupled with gangrene of right 2nd. toe.  She has multiple failed bypasses of right leg. 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 Her pain is out of proportion to the toe infection and likely is related to ischemic pain right leg .    She is scheduled for angiogram of right leg as recommended by vascular surgery , podiatry still feels the gangrenous toe should be amputated and then try HBOT     She has fecal impaction , most likely cause of abdominal pain.     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 She has severe PVD with occluded grafts , she is s/p resection of the right   2nd toe thru the metatarsal head     Plan :   - will continue with  Vancomycin , but dc Zosyn  as no GNR   - wound care as per podiatry   - pain control ? use of fentanyl patch   - trend renal function and CBC  - overall prognosis is poor. Discussed with Dr. Yakobav     Continue with present regime .  Appropriate use of antibiotics and adverse effects reviewed.      I have discussed the above plan of care with patient and  family in detail. They expressed understanding of the  treatment plan . Risks, benefits and alternatives discussed in detail. I have asked if they have any questions or concerns and appropriately addressed them to the best of my ability .    > 15 minutes were spent in direct patient care reviewing notes, medications ,labs data/ imaging , discussion with multidisciplinary team.    Thank you for allowing me to participate in care of your patient .    Judi Coyne MD  [phone]

## 2017-11-11 NOTE — PROGRESS NOTE ADULT - SUBJECTIVE AND OBJECTIVE BOX
81 yo Female with PMH of HLD, CVA, MVP, OA, HTN, PVD, Neuropathy, GERD with chief complaint of Right 2nd digit cellulitis. Pt admits throbbing pain last night to right 2nd digit. 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 Pt admits pain to right foot time to time but overall it is getting better. Pt denies N/V/F/C at this time.     H&P from ED: "82F PMHx Afib on Coumadin, HLD, CVA, MVP, OA, HTN, PVD (s/p RLE bypassx3, most recently 2012), Neuropathy, GERD presents with pain and swelling to right second toe that started 1 month ago. Patient states foot became red, painful, and a small wound with a scab started forming on the top portion of her second toe. It became extremely difficult to bear weight on the foot so she went to see Dr. Isaacs at the wound center and he drained the wound at which point, yellow pus drained from the wound. He prescribed her an antibiotic (she does not recall which one). She denies any fevers, chills, chesst pain or shortness of breat. Admits to joint pain due to her RA."    Vital Signs Last 24 Hrs  T(C): 36.8 (11 Nov 2017 05:19), Max: 36.8 (10 Nov 2017 19:52)  T(F): 98.3 (11 Nov 2017 05:19), Max: 98.3 (11 Nov 2017 05:19)  HR: 78 (11 Nov 2017 05:19) (68 - 78)  BP: 128/80 (11 Nov 2017 05:19) (118/73 - 146/80)  BP(mean): --  RR: 18 (11 Nov 2017 05:19) (18 - 18)  SpO2: 97% (11 Nov 2017 05:19) (94% - 97%)    Lower Extremity Examination  Vasc: DP, PT non palpable, CFT delayed to all digits, absent pedal hair bilat, TG: WNL  Neuro: gross protective sensation intact bilat  Derm: surgical site suture intact without wound dehiscences, no malodor, skin is thin, atrophic and shiny, no ischemic changes noted  MSK: pain on palpation to right forefoot                          9.5    9.3   )-----------( 287      ( 11 Nov 2017 07:15 )             30.8   11-11    140  |  105  |  20  ----------------------------<  103<H>  3.5   |  27  |  1.20    Ca    8.3<L>      11 Nov 2017 07:15

## 2017-11-11 NOTE — INPATIENT CERTIFICATION FOR MEDICARE PATIENTS - RISKS OF ADVERSE EVENTS
Concern for worsening infectious process/Other:/Concern for cardiopulmonary deterioration/Concern for delay in diagnosis and treatment

## 2017-11-11 NOTE — PROGRESS NOTE ADULT - SUBJECTIVE AND OBJECTIVE BOX
[facility] - Dahiana Bañuelos, Evens, Toney, Terrie, Filiberto Laughlin  Office Number:  [phone]    Patient resting comfortably in bed in NAD.  Laying flat with no respiratory distress.  No complaints of chest pain, dyspnea, palpitations, PND, or orthopnea.  Tired this morning. Remains with foot pain    ROS: negative unless otherwise mentioned.    Telemetry:  Not on tele    MEDICATIONS  (STANDING):  atorvastatin 40 milliGRAM(s) Oral at bedtime  diltiazem    Tablet 60 milliGRAM(s) Oral every 6 hours  docusate sodium 100 milliGRAM(s) Oral three times a day  gabapentin 100 milliGRAM(s) Oral three times a day  heparin  Injectable 5000 Unit(s) SubCutaneous every 8 hours  losartan 100 milliGRAM(s) Oral daily  metoprolol     tartrate 100 milliGRAM(s) Oral two times a day  multivitamin 1 Tablet(s) Oral daily  pantoprazole  Injectable 40 milliGRAM(s) IV Push every 12 hours  piperacillin/tazobactam IVPB. 3.375 Gram(s) IV Intermittent every 8 hours  vancomycin  IVPB 1000 milliGRAM(s) IV Intermittent every 24 hours    MEDICATIONS  (PRN):  acetaminophen   Tablet. 650 milliGRAM(s) Oral every 6 hours PRN Mild Pain (1 - 3)  ondansetron Injectable 4 milliGRAM(s) IV Push once PRN Nausea and/or Vomiting  oxyCODONE    IR 10 milliGRAM(s) Oral every 6 hours PRN Severe Pain (7 - 10)  oxyCODONE    IR 5 milliGRAM(s) Oral every 4 hours PRN Moderate Pain (4 - 6)      Allergies    No Known Allergies    Intolerances        Vital Signs Last 24 Hrs  T(C): 36.8 (11 Nov 2017 05:19), Max: 36.8 (10 Nov 2017 19:52)  T(F): 98.3 (11 Nov 2017 05:19), Max: 98.3 (11 Nov 2017 05:19)  HR: 78 (11 Nov 2017 05:19) (68 - 78)  BP: 128/80 (11 Nov 2017 05:19) (118/73 - 146/80)  BP(mean): --  RR: 18 (11 Nov 2017 05:19) (18 - 18)  SpO2: 97% (11 Nov 2017 05:19) (94% - 97%)    I&O's Summary      ON EXAM:    Constitutional: elderly female in NAD  HEENT:  EOMI, fixed dilated left pupil  Pulmonary: Non-labored, breath sounds are clear bilaterally, No wheezing, rales or rhonchi  Cardiovascular: Regular, S1 and S2.  No murmur.  No rubs, gallops or clicks  Gastrointestinal: Bowel Sounds present, soft, nontender.   Lymph: No peripheral edema.   Neurological: Alert, no focal deficits  Skin: No rashes. warm lower extremities. Right foot currently wrapped  Psych:  Mood & affect appropriate  LABS: All Labs Reviewed:                        9.5    9.3   )-----------( 287      ( 11 Nov 2017 07:15 )             30.8                         9.0    13.2  )-----------( 274      ( 10 Nov 2017 07:26 )             29.3                         9.1    10.0  )-----------( 270      ( 09 Nov 2017 07:07 )             29.8     11 Nov 2017 07:15    140    |  105    |  20     ----------------------------<  103    3.5     |  27     |  1.20   10 Nov 2017 07:26    142    |  108    |  19     ----------------------------<  93     3.4     |  24     |  1.20   09 Nov 2017 07:07    143    |  110    |  17     ----------------------------<  91     3.2     |  23     |  0.98     Ca    8.3        11 Nov 2017 07:15  Ca    8.1        10 Nov 2017 07:26  Ca    8.5        09 Nov 2017 07:07      PT/INR - ( 11 Nov 2017 07:15 )   PT: 12.7 sec;   INR: 1.16 ratio               Blood Culture: Organism --  Gram Stain Blood -- Gram Stain   No polymorphonuclear cells seen per low power field  No organisms seen per oil power field  Specimen Source .Tissue Other, 2nd metatarsal head rt foot  Culture-Blood --    Organism --  Gram Stain Blood -- Gram Stain   No polymorphonuclear cells seen per low power field  No organisms seen per oil power field  Specimen Source .Tissue Other, 2nd toe rt foot cultu  Culture-Blood --

## 2017-11-12 LAB
-  AMPICILLIN/SULBACTAM: SIGNIFICANT CHANGE UP
-  CEFAZOLIN: SIGNIFICANT CHANGE UP
-  CIPROFLOXACIN: SIGNIFICANT CHANGE UP
-  CLINDAMYCIN: SIGNIFICANT CHANGE UP
-  ERYTHROMYCIN: SIGNIFICANT CHANGE UP
-  GENTAMICIN: SIGNIFICANT CHANGE UP
-  LEVOFLOXACIN: SIGNIFICANT CHANGE UP
-  MOXIFLOXACIN(AEROBIC): SIGNIFICANT CHANGE UP
-  OXACILLIN: SIGNIFICANT CHANGE UP
-  RIFAMPIN: SIGNIFICANT CHANGE UP
-  TETRACYCLINE: SIGNIFICANT CHANGE UP
-  TRIMETHOPRIM/SULFAMETHOXAZOLE: SIGNIFICANT CHANGE UP
-  VANCOMYCIN: SIGNIFICANT CHANGE UP
ALBUMIN SERPL ELPH-MCNC: 2.3 G/DL — LOW (ref 3.3–5)
ALP SERPL-CCNC: 62 U/L — SIGNIFICANT CHANGE UP (ref 40–120)
ALT FLD-CCNC: 14 U/L — SIGNIFICANT CHANGE UP (ref 12–78)
ANION GAP SERPL CALC-SCNC: 6 MMOL/L — SIGNIFICANT CHANGE UP (ref 5–17)
AST SERPL-CCNC: 10 U/L — LOW (ref 15–37)
BASOPHILS # BLD AUTO: 0.1 K/UL — SIGNIFICANT CHANGE UP (ref 0–0.2)
BASOPHILS NFR BLD AUTO: 0.9 % — SIGNIFICANT CHANGE UP (ref 0–2)
BILIRUB SERPL-MCNC: 0.5 MG/DL — SIGNIFICANT CHANGE UP (ref 0.2–1.2)
BUN SERPL-MCNC: 23 MG/DL — SIGNIFICANT CHANGE UP (ref 7–23)
CALCIUM SERPL-MCNC: 8.2 MG/DL — LOW (ref 8.5–10.1)
CHLORIDE SERPL-SCNC: 104 MMOL/L — SIGNIFICANT CHANGE UP (ref 96–108)
CO2 SERPL-SCNC: 29 MMOL/L — SIGNIFICANT CHANGE UP (ref 22–31)
CREAT SERPL-MCNC: 1.2 MG/DL — SIGNIFICANT CHANGE UP (ref 0.5–1.3)
EOSINOPHIL # BLD AUTO: 0.5 K/UL — SIGNIFICANT CHANGE UP (ref 0–0.5)
EOSINOPHIL NFR BLD AUTO: 4.7 % — SIGNIFICANT CHANGE UP (ref 0–6)
GLUCOSE SERPL-MCNC: 117 MG/DL — HIGH (ref 70–99)
HCT VFR BLD CALC: 27.6 % — LOW (ref 34.5–45)
HGB BLD-MCNC: 8.6 G/DL — LOW (ref 11.5–15.5)
INR BLD: 1.13 RATIO — SIGNIFICANT CHANGE UP (ref 0.88–1.16)
LYMPHOCYTES # BLD AUTO: 1.4 K/UL — SIGNIFICANT CHANGE UP (ref 1–3.3)
LYMPHOCYTES # BLD AUTO: 13.9 % — SIGNIFICANT CHANGE UP (ref 13–44)
MCHC RBC-ENTMCNC: 28.7 PG — SIGNIFICANT CHANGE UP (ref 27–34)
MCHC RBC-ENTMCNC: 31.1 GM/DL — LOW (ref 32–36)
MCV RBC AUTO: 92.2 FL — SIGNIFICANT CHANGE UP (ref 80–100)
METHOD TYPE: SIGNIFICANT CHANGE UP
MONOCYTES # BLD AUTO: 0.8 K/UL — SIGNIFICANT CHANGE UP (ref 0–0.9)
MONOCYTES NFR BLD AUTO: 8.2 % — SIGNIFICANT CHANGE UP (ref 1–9)
NEUTROPHILS # BLD AUTO: 7 K/UL — SIGNIFICANT CHANGE UP (ref 1.8–7.4)
NEUTROPHILS NFR BLD AUTO: 72.3 % — SIGNIFICANT CHANGE UP (ref 43–77)
PLATELET # BLD AUTO: 258 K/UL — SIGNIFICANT CHANGE UP (ref 150–400)
POTASSIUM SERPL-MCNC: 3.5 MMOL/L — SIGNIFICANT CHANGE UP (ref 3.5–5.3)
POTASSIUM SERPL-SCNC: 3.5 MMOL/L — SIGNIFICANT CHANGE UP (ref 3.5–5.3)
PROT SERPL-MCNC: 5.9 G/DL — LOW (ref 6–8.3)
PROTHROM AB SERPL-ACNC: 12.4 SEC — SIGNIFICANT CHANGE UP (ref 9.8–12.7)
RBC # BLD: 2.99 M/UL — LOW (ref 3.8–5.2)
RBC # FLD: 15.3 % — HIGH (ref 10.3–14.5)
SODIUM SERPL-SCNC: 139 MMOL/L — SIGNIFICANT CHANGE UP (ref 135–145)
WBC # BLD: 9.8 K/UL — SIGNIFICANT CHANGE UP (ref 3.8–10.5)
WBC # FLD AUTO: 9.8 K/UL — SIGNIFICANT CHANGE UP (ref 3.8–10.5)

## 2017-11-12 PROCEDURE — 99233 SBSQ HOSP IP/OBS HIGH 50: CPT

## 2017-11-12 RX ORDER — OXYCODONE HYDROCHLORIDE 5 MG/1
10 TABLET ORAL EVERY 4 HOURS
Qty: 0 | Refills: 0 | Status: DISCONTINUED | OUTPATIENT
Start: 2017-11-12 | End: 2017-11-15

## 2017-11-12 RX ORDER — CEFAZOLIN SODIUM 1 G
1000 VIAL (EA) INJECTION EVERY 8 HOURS
Qty: 0 | Refills: 0 | Status: DISCONTINUED | OUTPATIENT
Start: 2017-11-12 | End: 2017-11-14

## 2017-11-12 RX ORDER — NYSTATIN CREAM 100000 [USP'U]/G
1 CREAM TOPICAL
Qty: 0 | Refills: 0 | Status: DISCONTINUED | OUTPATIENT
Start: 2017-11-12 | End: 2017-11-15

## 2017-11-12 RX ORDER — WARFARIN SODIUM 2.5 MG/1
3 TABLET ORAL ONCE
Qty: 0 | Refills: 0 | Status: COMPLETED | OUTPATIENT
Start: 2017-11-12 | End: 2017-11-12

## 2017-11-12 RX ADMIN — OXYCODONE HYDROCHLORIDE 10 MILLIGRAM(S): 5 TABLET ORAL at 12:33

## 2017-11-12 RX ADMIN — OXYCODONE HYDROCHLORIDE 10 MILLIGRAM(S): 5 TABLET ORAL at 21:26

## 2017-11-12 RX ADMIN — Medication 100 MILLIGRAM(S): at 17:22

## 2017-11-12 RX ADMIN — GABAPENTIN 100 MILLIGRAM(S): 400 CAPSULE ORAL at 21:26

## 2017-11-12 RX ADMIN — LOSARTAN POTASSIUM 100 MILLIGRAM(S): 100 TABLET, FILM COATED ORAL at 05:49

## 2017-11-12 RX ADMIN — Medication 100 MILLIGRAM(S): at 21:26

## 2017-11-12 RX ADMIN — OXYCODONE HYDROCHLORIDE 5 MILLIGRAM(S): 5 TABLET ORAL at 17:30

## 2017-11-12 RX ADMIN — PANTOPRAZOLE SODIUM 40 MILLIGRAM(S): 20 TABLET, DELAYED RELEASE ORAL at 05:48

## 2017-11-12 RX ADMIN — OXYCODONE HYDROCHLORIDE 10 MILLIGRAM(S): 5 TABLET ORAL at 13:29

## 2017-11-12 RX ADMIN — PANTOPRAZOLE SODIUM 40 MILLIGRAM(S): 20 TABLET, DELAYED RELEASE ORAL at 17:23

## 2017-11-12 RX ADMIN — Medication 250 MILLIGRAM(S): at 02:52

## 2017-11-12 RX ADMIN — GABAPENTIN 100 MILLIGRAM(S): 400 CAPSULE ORAL at 13:44

## 2017-11-12 RX ADMIN — Medication 100 MILLIGRAM(S): at 13:44

## 2017-11-12 RX ADMIN — WARFARIN SODIUM 3 MILLIGRAM(S): 2.5 TABLET ORAL at 21:37

## 2017-11-12 RX ADMIN — NYSTATIN CREAM 1 APPLICATION(S): 100000 CREAM TOPICAL at 18:19

## 2017-11-12 RX ADMIN — Medication 1 TABLET(S): at 11:22

## 2017-11-12 RX ADMIN — Medication 100 MILLIGRAM(S): at 05:45

## 2017-11-12 RX ADMIN — OXYCODONE HYDROCHLORIDE 10 MILLIGRAM(S): 5 TABLET ORAL at 05:45

## 2017-11-12 RX ADMIN — Medication 100 MILLIGRAM(S): at 14:26

## 2017-11-12 RX ADMIN — GABAPENTIN 100 MILLIGRAM(S): 400 CAPSULE ORAL at 05:47

## 2017-11-12 RX ADMIN — Medication 100 MILLIGRAM(S): at 05:47

## 2017-11-12 RX ADMIN — OXYCODONE HYDROCHLORIDE 10 MILLIGRAM(S): 5 TABLET ORAL at 06:50

## 2017-11-12 RX ADMIN — ATORVASTATIN CALCIUM 40 MILLIGRAM(S): 80 TABLET, FILM COATED ORAL at 21:26

## 2017-11-12 RX ADMIN — OXYCODONE HYDROCHLORIDE 10 MILLIGRAM(S): 5 TABLET ORAL at 22:00

## 2017-11-12 NOTE — PROGRESS NOTE ADULT - SUBJECTIVE AND OBJECTIVE BOX
Patient is a 82y old  Female who presents with a chief complaint of weakness and pain both legs (28 Oct 2017 20:23)      INTERVAL HPI: Pt seen and examined. States her R foot is still uncomfortable and would like to readjust timinig of pain med. Admits to some mild constipation but will trial prune juice with meals to see if this will help otherwise is amendable to po meds. Denies any other acute complaints at this time.       OVERNIGHT EVENTS:  T(F): 98 (11-12-17 @ 20:21), Max: 98.6 (11-12-17 @ 13:27)  HR: 70 (11-12-17 @ 20:21) (70 - 81)  BP: 116/71 (11-12-17 @ 20:21) (110/62 - 130/72)  RR: 17 (11-12-17 @ 20:21) (17 - 18)  SpO2: 95% (11-12-17 @ 20:21) (92% - 97%)  I&O's Summary    11 Nov 2017 07:01  -  12 Nov 2017 07:00  --------------------------------------------------------  IN: 100 mL / OUT: 0 mL / NET: 100 mL    12 Nov 2017 07:01  -  12 Nov 2017 21:20  --------------------------------------------------------  IN: 100 mL / OUT: 0 mL / NET: 100 mL        REVIEW OF SYSTEMS: 12 systems reviewed and noncontributory    PHYSICAL EXAM:  GENERAL: NAD, elder  HEAD:  Atraumatic, Normocephalic  EYES: EOMI, PERRLA, conjunctiva and sclera clear  ENMT: No tonsillar erythema, exudates, or enlargement; Moist mucous membranes, Good dentition, No lesions  NERVOUS SYSTEM:  Alert & Oriented X3, Good concentration; Motor Strength 5/5 B/L upper and lower extremities  CHEST/LUNG: Clear to percussion bilaterally; No rales, rhonchi, wheezing, or rubs  HEART: Regular rate and rhythm; No murmurs, rubs, or gallops  ABDOMEN: Soft, Nontender, Nondistended; Bowel sounds present  EXTREMITIES:  2+ Peripheral Pulses, No clubbing, cyanosis, or edema, RLE dressing c/d/i  SKIN: No rashes or lesions    LABS:                        8.6    9.8   )-----------( 258      ( 12 Nov 2017 07:39 )             27.6     11-12    139  |  104  |  23  ----------------------------<  117<H>  3.5   |  29  |  1.20    Ca    8.2<L>      12 Nov 2017 07:39    TPro  5.9<L>  /  Alb  2.3<L>  /  TBili  0.5  /  DBili  x   /  AST  10<L>  /  ALT  14  /  AlkPhos  62  11-12    PT/INR - ( 12 Nov 2017 07:39 )   PT: 12.4 sec;   INR: 1.13 ratio             CAPILLARY BLOOD GLUCOSE          11-09 @ 20:44   Few Staphylococcus aureus  --  Staphylococcus aureus  11-09 @ 20:43   Growth in fluid media only Coag Negative Staphylococcus  --  --          MEDICATIONS  (STANDING):  atorvastatin 40 milliGRAM(s) Oral at bedtime  ceFAZolin   IVPB 1000 milliGRAM(s) IV Intermittent every 8 hours  diltiazem    Tablet 60 milliGRAM(s) Oral every 6 hours  docusate sodium 100 milliGRAM(s) Oral three times a day  gabapentin 100 milliGRAM(s) Oral three times a day  losartan 100 milliGRAM(s) Oral daily  metoprolol     tartrate 100 milliGRAM(s) Oral two times a day  multivitamin 1 Tablet(s) Oral daily  nystatin Powder 1 Application(s) Topical two times a day  pantoprazole  Injectable 40 milliGRAM(s) IV Push every 12 hours    MEDICATIONS  (PRN):  acetaminophen   Tablet. 650 milliGRAM(s) Oral every 6 hours PRN Mild Pain (1 - 3)  ondansetron Injectable 4 milliGRAM(s) IV Push once PRN Nausea and/or Vomiting  oxyCODONE    IR 10 milliGRAM(s) Oral every 4 hours PRN Severe Pain (7 - 10)  oxyCODONE    IR 5 milliGRAM(s) Oral every 4 hours PRN Moderate Pain (4 - 6)

## 2017-11-12 NOTE — PROGRESS NOTE ADULT - SUBJECTIVE AND OBJECTIVE BOX
CARITO CONSTANTINO is a 82yFemale , patient examined and chart reviewed. 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  Osteoarthritis  Afib  PVD (peripheral vascular disease)  Neuropathy: (Right lower leg)  H/O cerebral aneurysm repair: brain clips  CVA (cerebral vascular accident): (&quot;Mini-stroke&quot;,1990&#x27;s)  Rheumatoid arthritis  Hyperlipidemia  Hypertension  S/P cataract surgery: (Left eye)  Elective surgery: (&quot;Twisted bowel&quot;, 2014)  Elective surgery: (Exision of cyst on liver, 1985)  S/P ORIF (open reduction internal fixation) fracture: Left hip fx (2012) &amp; R hip fx (2013)  H/O cerebral aneurysm repair: Brain clips (1978(  Renal stone: Cysto stent placement 10/1/2014  PVD (peripheral vascular disease): s/p RLE bypass x 3, most recent 3/2012 Right external iliac to PT bypass w/ PTFE (2012)  S/P FRED (total abdominal hysterectomy): (1987, Hx of &quot;ovarian cancer?&quot;)      For details regarding the patient's social history, family history, and other miscellaneous elements, please refer the initial infectious diseases consultation and/or the admitting history and physical examination for this admission.      ROS:  CONSTITUTIONAL:  Negative fever or chills, feels well, good appetite  EYES:  Negative  blurry vision or double vision  CARDIOVASCULAR:  Negative for chest pain or palpitations  RESPIRATORY:  Negative for cough, wheezing, or SOB   GASTROINTESTINAL:  Negative for nausea, vomiting, diarrhea, constipation, or abdominal pain  GENITOURINARY:  Negative frequency, urgency or dysuria  NEUROLOGIC:  No headache, confusion, dizziness, lightheadedness  All other systems were reviewed and are negative         Current inpatient medications :    ANTIBIOTICS/RELEVANT:  vancomycin  IVPB 1000 milliGRAM(s) IV Intermittent every 24 hours      acetaminophen   Tablet. 650 milliGRAM(s) Oral every 6 hours PRN  atorvastatin 40 milliGRAM(s) Oral at bedtime  diltiazem    Tablet 60 milliGRAM(s) Oral every 6 hours  docusate sodium 100 milliGRAM(s) Oral three times a day  gabapentin 100 milliGRAM(s) Oral three times a day  losartan 100 milliGRAM(s) Oral daily  metoprolol     tartrate 100 milliGRAM(s) Oral two times a day  multivitamin 1 Tablet(s) Oral daily  ondansetron Injectable 4 milliGRAM(s) IV Push once PRN  oxyCODONE    IR 10 milliGRAM(s) Oral every 6 hours PRN  oxyCODONE    IR 5 milliGRAM(s) Oral every 4 hours PRN  pantoprazole  Injectable 40 milliGRAM(s) IV Push every 12 hours      Objective:    11-11 @ 07:01  -  11-12 @ 07:00  --------------------------------------------------------  IN: 100 mL / OUT: 0 mL / NET: 100 mL      T(C): 36.7 (11-12-17 @ 05:31), Max: 36.8 (11-11-17 @ 13:27)  HR: 71 (11-12-17 @ 11:17) (70 - 81)  BP: 126/62 (11-12-17 @ 11:17) (122/66 - 136/79)  RR: 17 (11-12-17 @ 11:17) (16 - 18)  SpO2: 94% (11-12-17 @ 11:17) (94% - 97%)  Wt(kg): --      Physical Exam:  General: well developed well nourished, in no acute distress  Eyes: sclera anicteric, pupils equal and reactive to light  ENMT: buccal mucosa moist, pharynx not injected  Neck: supple, trachea midline  Lungs: clear, no wheeze/rhonchi  Cardiovascular: regular rate and rhythm, S1 S2  Abdomen: soft, nontender, no organomegaly present, bowel sounds normal  Neurological: alert and oriented x3, Cranial Nerves II-XII grossly intact  Skin: no increased ecchymosis/petechiae/purpura  Lymph Nodes: no palpable cervical/supraclavicular lymph nodes enlargements  Extremities: no cyanosis/clubbing/edema, dressing left foot- C/D/I      LABS:                          8.6    9.8   )-----------( 258      ( 12 Nov 2017 07:39 )             27.6       11-12    139  |  104  |  23  ----------------------------<  117<H>  3.5   |  29  |  1.20    Ca    8.2<L>      12 Nov 2017 07:39    TPro  5.9<L>  /  Alb  2.3<L>  /  TBili  0.5  /  DBili  x   /  AST  10<L>  /  ALT  14  /  AlkPhos  62  11-12      PT/INR - ( 12 Nov 2017 07:39 )   PT: 12.4 sec;   INR: 1.13 ratio             Vancomycin Level, Trough: 18.1 ug/mL (11-11 @ 07:16)          RECENT CULTURES:    Culture - Tissue with Gram Stain (11.09.17 @ 20:44)    -  Cefazolin: S <=4    -  Ciprofloxacin: S <=1    -  Clindamycin: S <=0.25    -  Erythromycin: S <=0.25    -  Gentamicin: S <=1    -  Moxifloxacin(Aerobic): S <=0.5    -  Oxacillin: S <=0.25    -  RIF- Rifampin: S <=1    -  Tetra/Doxy: S <=1    -  Trimethoprim/Sulfamethoxazole: S <=0.5/9.5    -  Ampicillin/Sulbactam: S <=8/4    -  Vancomycin: S 2    Gram Stain:   No polymorphonuclear cells seen per low power field  No organisms seen per oil power field    -  Levofloxacin: S <=0.5    -  Penicillin: R 8    Specimen Source: .Tissue Other, 2nd metatarsal head rt foot    Culture Results:   Few Staphylococcus aureus    Organism Identification: Staphylococcus aureus    Organism: Staphylococcus aureus    Method Type: KWABENA    Culture - Tissue with Gram Stain (collected 09 Nov 2017 20:43)  Source: .Tissue Other, 2nd toe rt foot cultu  Gram Stain (09 Nov 2017 21:52):    No polymorphonuclear cells seen per low power field    No organisms seen per oil power field  Preliminary Report (11 Nov 2017 22:05):    Growth in fluid media only Coag Negative Staphylococcus     Assessment:  82 y.o. female with multiple medical problems Afib on Coumadin, HLD, CVA, MVP, OA, HTN, PVD (s/p RLE bypassx3, most recently 2012), Neuropathy, GERD  presents with  ischemic right foot and leg coupled with gangrene of right 2nd. toe.  She has multiple failed bypasses of right leg. 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 Her pain is out of proportion to the toe infection and likely is related to ischemic pain right leg .    She is scheduled for angiogram of right leg as recommended by vascular surgery , podiatry still feels the gangrenous toe should be amputated and then try HBOT     She has fecal impaction , most likely cause of abdominal pain.     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 She has severe PVD with occluded grafts , she is s/p resection of the right   2nd toe thru the metatarsal head     Plan :   - will change to Ancef 1 gram q 8 hours as it is MSSA  - wound care as per podiatry   - pain control ? use of fentanyl patch   - trend renal function and CBC  - overall prognosis is poor. Discussed with Dr. Casiano     Continue with present regime .  Appropriate use of antibiotics and adverse effects reviewed.    I have discussed the above plan of care with patient and  family in detail. They expressed understanding of the  treatment plan . Risks, benefits and alternatives discussed in detail. I have asked if they have any questions or concerns and appropriately addressed them to the best of my ability .    > 15 minutes were spent in direct patient care reviewing notes, medications ,labs data/ imaging , discussion with multidisciplinary team.    Thank you for allowing me to participate in care of your patient .    Judi Coyne MD  [phone]

## 2017-11-12 NOTE — PROGRESS NOTE ADULT - SUBJECTIVE AND OBJECTIVE BOX
[facility] - Dahiana Bañuelos, Evens, Toney, Terrie, Filiberto Laughlin  Office Number:  [phone]    Patient resting comfortably in bed in NAD.  Laying flat with no respiratory distress.  No complaints of chest pain, dyspnea, palpitations, PND, or orthopnea.  Tired this morning. Remains with foot pain    ROS: negative unless otherwise mentioned.    Telemetry:  Not on tele    MEDICATIONS  (STANDING):  atorvastatin 40 milliGRAM(s) Oral at bedtime  diltiazem    Tablet 60 milliGRAM(s) Oral every 6 hours  docusate sodium 100 milliGRAM(s) Oral three times a day  gabapentin 100 milliGRAM(s) Oral three times a day  losartan 100 milliGRAM(s) Oral daily  metoprolol     tartrate 100 milliGRAM(s) Oral two times a day  multivitamin 1 Tablet(s) Oral daily  pantoprazole  Injectable 40 milliGRAM(s) IV Push every 12 hours  vancomycin  IVPB 1000 milliGRAM(s) IV Intermittent every 24 hours    MEDICATIONS  (PRN):  acetaminophen   Tablet. 650 milliGRAM(s) Oral every 6 hours PRN Mild Pain (1 - 3)  ondansetron Injectable 4 milliGRAM(s) IV Push once PRN Nausea and/or Vomiting  oxyCODONE    IR 10 milliGRAM(s) Oral every 6 hours PRN Severe Pain (7 - 10)  oxyCODONE    IR 5 milliGRAM(s) Oral every 4 hours PRN Moderate Pain (4 - 6)      Allergies    No Known Allergies    Intolerances        Vital Signs Last 24 Hrs  T(C): 36.7 (12 Nov 2017 05:31), Max: 36.8 (11 Nov 2017 13:27)  T(F): 98.1 (12 Nov 2017 05:31), Max: 98.3 (11 Nov 2017 20:07)  HR: 70 (12 Nov 2017 05:31) (70 - 81)  BP: 130/72 (12 Nov 2017 05:31) (119/71 - 136/79)  BP(mean): --  RR: 17 (12 Nov 2017 05:31) (16 - 18)  SpO2: 97% (12 Nov 2017 05:31) (94% - 97%)    I&O's Summary    11 Nov 2017 07:01  -  12 Nov 2017 07:00  --------------------------------------------------------  IN: 100 mL / OUT: 0 mL / NET: 100 mL        ON EXAM:    Constitutional: elderly female in NAD  HEENT:  EOMI, fixed dilated left pupil  Pulmonary: Non-labored, breath sounds are clear bilaterally, No wheezing, rales or rhonchi  Cardiovascular: Regular, S1 and S2.  No murmur.  No rubs, gallops or clicks  Gastrointestinal: Bowel Sounds present, soft, nontender.   Lymph: No peripheral edema.   Neurological: Alert, no focal deficits  Skin: No rashes. warm lower extremities. Right foot currently wrapped  Psych:  Mood & affect appropriate  LABS: All Labs Reviewed:    LABS: All Labs Reviewed:                        8.6    9.8   )-----------( 258      ( 12 Nov 2017 07:39 )             27.6                         9.5    9.3   )-----------( 287      ( 11 Nov 2017 07:15 )             30.8                         9.0    13.2  )-----------( 274      ( 10 Nov 2017 07:26 )             29.3     12 Nov 2017 07:39    139    |  104    |  23     ----------------------------<  117    3.5     |  29     |  1.20   11 Nov 2017 07:15    140    |  105    |  20     ----------------------------<  103    3.5     |  27     |  1.20   10 Nov 2017 07:26    142    |  108    |  19     ----------------------------<  93     3.4     |  24     |  1.20     Ca    8.2        12 Nov 2017 07:39  Ca    8.3        11 Nov 2017 07:15  Ca    8.1        10 Nov 2017 07:26    TPro  5.9    /  Alb  2.3    /  TBili  0.5    /  DBili  x      /  AST  10     /  ALT  14     /  AlkPhos  62     12 Nov 2017 07:39    PT/INR - ( 12 Nov 2017 07:39 )   PT: 12.4 sec;   INR: 1.13 ratio               Blood Culture: Organism Staphylococcus aureus  Gram Stain Blood -- Gram Stain   No polymorphonuclear cells seen per low power field  No organisms seen per oil power field  Specimen Source .Tissue Other, 2nd metatarsal head rt foot  Culture-Blood --    Organism --  Gram Stain Blood -- Gram Stain   No polymorphonuclear cells seen per low power field  No organisms seen per oil power field  Specimen Source .Tissue Other, 2nd toe rt foot cultu  Culture-Blood --

## 2017-11-12 NOTE — PROGRESS NOTE ADULT - ASSESSMENT
82F PMHx Afib on Coumadin, HLD, CVA, MVP, OA, HTN, PVD (s/p RLE bypassx3, most recently 2012), Neuropathy, GERD presents with pain and swelling to right second toe a/w RLE cellulitis, r/o osteomyelitis. Found to have symptomatic anemia and foot infection.    -Patient still in SR, continue Cardizem 60mg q6 and metoprolol 100 BID. HR and BP acceptable.  - Can change Cardizem to  mg po daily at time of discharge  -No evidence of acute ischemia or meaningful volume overload  -Extensive history of PVD and stenotic valvular disease. Now with gangrene. s/p toe amputation. Seems to have tolerated procedure well.  -Recommend pain control  -Monitor H/H, currently stable, transfuse as needed.   -Resume coumadin with goal INR 2-3. No bridging is necessary  -TTE (Nov 4/17): Technically limited study. Normal left ventricular size and systolic function, estimated LVEF of 65%. Grade 1 diastolic dysfunction. Normal biatrial size. The mitral valve is structurally normal, 1+ MR.  -Monitor electrolytes, keep K>4, Mg>2  - d/c planning  -Will continue to follow

## 2017-11-12 NOTE — PROGRESS NOTE ADULT - PROBLEM SELECTOR PLAN 4
controlled  continue cardizem and metoprolol with parameters  maintain HR < 110  restarted AC with coumadin  f/u cardiology (Dr. De Los Santos), no bridging as per cardio needed, trend pt/inro=

## 2017-11-13 LAB
ALBUMIN SERPL ELPH-MCNC: 2.3 G/DL — LOW (ref 3.3–5)
ALP SERPL-CCNC: 64 U/L — SIGNIFICANT CHANGE UP (ref 40–120)
ALT FLD-CCNC: 12 U/L — SIGNIFICANT CHANGE UP (ref 12–78)
ANION GAP SERPL CALC-SCNC: 9 MMOL/L — SIGNIFICANT CHANGE UP (ref 5–17)
AST SERPL-CCNC: 11 U/L — LOW (ref 15–37)
BASOPHILS # BLD AUTO: 0.1 K/UL — SIGNIFICANT CHANGE UP (ref 0–0.2)
BASOPHILS NFR BLD AUTO: 0.9 % — SIGNIFICANT CHANGE UP (ref 0–2)
BILIRUB SERPL-MCNC: 0.4 MG/DL — SIGNIFICANT CHANGE UP (ref 0.2–1.2)
BUN SERPL-MCNC: 22 MG/DL — SIGNIFICANT CHANGE UP (ref 7–23)
CALCIUM SERPL-MCNC: 8.2 MG/DL — LOW (ref 8.5–10.1)
CHLORIDE SERPL-SCNC: 104 MMOL/L — SIGNIFICANT CHANGE UP (ref 96–108)
CO2 SERPL-SCNC: 28 MMOL/L — SIGNIFICANT CHANGE UP (ref 22–31)
CREAT SERPL-MCNC: 1.2 MG/DL — SIGNIFICANT CHANGE UP (ref 0.5–1.3)
EOSINOPHIL # BLD AUTO: 0.4 K/UL — SIGNIFICANT CHANGE UP (ref 0–0.5)
EOSINOPHIL NFR BLD AUTO: 4.7 % — SIGNIFICANT CHANGE UP (ref 0–6)
GLUCOSE SERPL-MCNC: 99 MG/DL — SIGNIFICANT CHANGE UP (ref 70–99)
HCT VFR BLD CALC: 29.4 % — LOW (ref 34.5–45)
HGB BLD-MCNC: 8.9 G/DL — LOW (ref 11.5–15.5)
INR BLD: 1.12 RATIO — SIGNIFICANT CHANGE UP (ref 0.88–1.16)
LYMPHOCYTES # BLD AUTO: 1.5 K/UL — SIGNIFICANT CHANGE UP (ref 1–3.3)
LYMPHOCYTES # BLD AUTO: 17.6 % — SIGNIFICANT CHANGE UP (ref 13–44)
MCHC RBC-ENTMCNC: 28 PG — SIGNIFICANT CHANGE UP (ref 27–34)
MCHC RBC-ENTMCNC: 30.3 GM/DL — LOW (ref 32–36)
MCV RBC AUTO: 92.6 FL — SIGNIFICANT CHANGE UP (ref 80–100)
MONOCYTES # BLD AUTO: 0.7 K/UL — SIGNIFICANT CHANGE UP (ref 0–0.9)
MONOCYTES NFR BLD AUTO: 8.3 % — SIGNIFICANT CHANGE UP (ref 1–9)
NEUTROPHILS # BLD AUTO: 5.9 K/UL — SIGNIFICANT CHANGE UP (ref 1.8–7.4)
NEUTROPHILS NFR BLD AUTO: 68.4 % — SIGNIFICANT CHANGE UP (ref 43–77)
PLATELET # BLD AUTO: 264 K/UL — SIGNIFICANT CHANGE UP (ref 150–400)
POTASSIUM SERPL-MCNC: 3.5 MMOL/L — SIGNIFICANT CHANGE UP (ref 3.5–5.3)
POTASSIUM SERPL-SCNC: 3.5 MMOL/L — SIGNIFICANT CHANGE UP (ref 3.5–5.3)
PROT SERPL-MCNC: 6.2 G/DL — SIGNIFICANT CHANGE UP (ref 6–8.3)
PROTHROM AB SERPL-ACNC: 12.2 SEC — SIGNIFICANT CHANGE UP (ref 9.8–12.7)
RBC # BLD: 3.18 M/UL — LOW (ref 3.8–5.2)
RBC # FLD: 14.7 % — HIGH (ref 10.3–14.5)
SODIUM SERPL-SCNC: 141 MMOL/L — SIGNIFICANT CHANGE UP (ref 135–145)
SURGICAL PATHOLOGY FINAL REPORT - CH: SIGNIFICANT CHANGE UP
WBC # BLD: 8.7 K/UL — SIGNIFICANT CHANGE UP (ref 3.8–10.5)
WBC # FLD AUTO: 8.7 K/UL — SIGNIFICANT CHANGE UP (ref 3.8–10.5)

## 2017-11-13 PROCEDURE — 99233 SBSQ HOSP IP/OBS HIGH 50: CPT | Mod: GC

## 2017-11-13 PROCEDURE — 99233 SBSQ HOSP IP/OBS HIGH 50: CPT

## 2017-11-13 RX ORDER — WARFARIN SODIUM 2.5 MG/1
3 TABLET ORAL ONCE
Qty: 0 | Refills: 0 | Status: COMPLETED | OUTPATIENT
Start: 2017-11-13 | End: 2017-11-13

## 2017-11-13 RX ORDER — GABAPENTIN 400 MG/1
300 CAPSULE ORAL THREE TIMES A DAY
Qty: 0 | Refills: 0 | Status: DISCONTINUED | OUTPATIENT
Start: 2017-11-13 | End: 2017-11-15

## 2017-11-13 RX ORDER — SENNA PLUS 8.6 MG/1
2 TABLET ORAL AT BEDTIME
Qty: 0 | Refills: 0 | Status: DISCONTINUED | OUTPATIENT
Start: 2017-11-13 | End: 2017-11-15

## 2017-11-13 RX ORDER — OXYCODONE HYDROCHLORIDE 5 MG/1
5 TABLET ORAL ONCE
Qty: 0 | Refills: 0 | Status: DISCONTINUED | OUTPATIENT
Start: 2017-11-13 | End: 2017-11-13

## 2017-11-13 RX ADMIN — SENNA PLUS 2 TABLET(S): 8.6 TABLET ORAL at 22:11

## 2017-11-13 RX ADMIN — GABAPENTIN 100 MILLIGRAM(S): 400 CAPSULE ORAL at 14:28

## 2017-11-13 RX ADMIN — NYSTATIN CREAM 1 APPLICATION(S): 100000 CREAM TOPICAL at 18:57

## 2017-11-13 RX ADMIN — Medication 100 MILLIGRAM(S): at 14:28

## 2017-11-13 RX ADMIN — OXYCODONE HYDROCHLORIDE 5 MILLIGRAM(S): 5 TABLET ORAL at 13:00

## 2017-11-13 RX ADMIN — LOSARTAN POTASSIUM 100 MILLIGRAM(S): 100 TABLET, FILM COATED ORAL at 05:17

## 2017-11-13 RX ADMIN — OXYCODONE HYDROCHLORIDE 5 MILLIGRAM(S): 5 TABLET ORAL at 17:00

## 2017-11-13 RX ADMIN — OXYCODONE HYDROCHLORIDE 5 MILLIGRAM(S): 5 TABLET ORAL at 12:15

## 2017-11-13 RX ADMIN — Medication 100 MILLIGRAM(S): at 05:17

## 2017-11-13 RX ADMIN — Medication 100 MILLIGRAM(S): at 05:16

## 2017-11-13 RX ADMIN — OXYCODONE HYDROCHLORIDE 5 MILLIGRAM(S): 5 TABLET ORAL at 08:22

## 2017-11-13 RX ADMIN — NYSTATIN CREAM 1 APPLICATION(S): 100000 CREAM TOPICAL at 05:16

## 2017-11-13 RX ADMIN — GABAPENTIN 100 MILLIGRAM(S): 400 CAPSULE ORAL at 05:16

## 2017-11-13 RX ADMIN — PANTOPRAZOLE SODIUM 40 MILLIGRAM(S): 20 TABLET, DELAYED RELEASE ORAL at 05:16

## 2017-11-13 RX ADMIN — Medication 100 MILLIGRAM(S): at 22:10

## 2017-11-13 RX ADMIN — ATORVASTATIN CALCIUM 40 MILLIGRAM(S): 80 TABLET, FILM COATED ORAL at 22:11

## 2017-11-13 RX ADMIN — GABAPENTIN 300 MILLIGRAM(S): 400 CAPSULE ORAL at 22:19

## 2017-11-13 RX ADMIN — PANTOPRAZOLE SODIUM 40 MILLIGRAM(S): 20 TABLET, DELAYED RELEASE ORAL at 18:58

## 2017-11-13 RX ADMIN — WARFARIN SODIUM 3 MILLIGRAM(S): 2.5 TABLET ORAL at 22:11

## 2017-11-13 RX ADMIN — Medication 1 TABLET(S): at 12:16

## 2017-11-13 RX ADMIN — OXYCODONE HYDROCHLORIDE 5 MILLIGRAM(S): 5 TABLET ORAL at 09:10

## 2017-11-13 RX ADMIN — OXYCODONE HYDROCHLORIDE 5 MILLIGRAM(S): 5 TABLET ORAL at 16:08

## 2017-11-13 NOTE — PROGRESS NOTE ADULT - SUBJECTIVE AND OBJECTIVE BOX
Interval Events: Pt denies abdominal pain, nausea, vomiting, diarrhea, constipation, BRBPR, melena.     HPI:82F PMHx Afib on Coumadin, HLD, CVA, MVP, OA, HTN, PVD (s/p RLE bypassx3, most recently 2012), Neuropathy, GERD presents with pain and swelling to right second toe that started 1 month ago. Patient states foot became red, painful, and a small wound with a scab started forming on the top portion of her second toe. It became extremely difficult to bear weight on the foot so she went to see Dr. Isaacs at the wound center and he drained the wound at which point, yellow pus drained from the wound. He prescribed her an antibiotic (she does not recall which one). She denies any fevers, chills, chest pain or shortness of breat. Admits to joint pain due to her RA.     In ED, pt's vital was wnl. WBC 8.2, Hct: 26.1, lactate 0.8, , CMP revealed elevated bilirubin of 1.5, remainder is wnl , INR: 2.26. CXR showed no acute pathology. X ray of Rt foot showed possible small periarticular erosion medial aspect head of the proximal phalanx which might represent infection/ osteomyelitis. Pt received 1 dose  of  vanco and 1 dose of zosyn and 1 bolus of NS, BCX, UCX was sent in  ED. (28 Oct 2017 13:42)        MEDICATIONS  (STANDING):  atorvastatin 40 milliGRAM(s) Oral at bedtime  ceFAZolin   IVPB 1000 milliGRAM(s) IV Intermittent every 8 hours  diltiazem    Tablet 60 milliGRAM(s) Oral every 6 hours  docusate sodium 100 milliGRAM(s) Oral three times a day  gabapentin 100 milliGRAM(s) Oral three times a day  losartan 100 milliGRAM(s) Oral daily  metoprolol     tartrate 100 milliGRAM(s) Oral two times a day  multivitamin 1 Tablet(s) Oral daily  nystatin Powder 1 Application(s) Topical two times a day  pantoprazole  Injectable 40 milliGRAM(s) IV Push every 12 hours    MEDICATIONS  (PRN):  acetaminophen   Tablet. 650 milliGRAM(s) Oral every 6 hours PRN Mild Pain (1 - 3)  ondansetron Injectable 4 milliGRAM(s) IV Push once PRN Nausea and/or Vomiting  oxyCODONE    IR 10 milliGRAM(s) Oral every 4 hours PRN Severe Pain (7 - 10)  oxyCODONE    IR 5 milliGRAM(s) Oral every 4 hours PRN Moderate Pain (4 - 6)      Allergies    No Known Allergies    Intolerances        Review of Systems:    General:  No wt loss, fevers, chills, night sweats,fatigue,   Eyes:  Good vision, no reported pain  ENT:  No sore throat, pain, runny nose, dysphagia  CV:  No pain, palpitations, hypo/hypertension  Resp:  No dyspnea, cough, tachypnea, wheezing  GI:  No pain, No nausea, No vomiting, No diarrhea, No constipation, No weight loss, No fever, No pruritis, No rectal bleeding, No melena, No dysphagia  :  No pain, bleeding, incontinence, nocturia  Muscle:  No pain, weakness  Neuro:  No weakness, tingling, memory problems  Psych:  No fatigue, insomnia, mood problems, depression  Endocrine:  No polyuria, polydypsia, cold/heat intolerance  Heme:  No petechiae, ecchymosis, easy bruisability  Skin:  No rash, tattoos, scars, edema      Vital Signs Last 24 Hrs  T(C): 36.6 (13 Nov 2017 05:24), Max: 37 (12 Nov 2017 13:27)  T(F): 97.9 (13 Nov 2017 05:24), Max: 98.6 (12 Nov 2017 13:27)  HR: 70 (13 Nov 2017 05:24) (67 - 81)  BP: 116/66 (13 Nov 2017 05:24) (110/62 - 123/73)  BP(mean): --  RR: 18 (13 Nov 2017 05:24) (17 - 18)  SpO2: 92% (13 Nov 2017 05:24) (92% - 95%)    PHYSICAL EXAM:    Constitutional: NAD, well-developed  HEENT: EOMI, throat clear  Neck: No LAD, supple  Respiratory: CTA and P  Cardiovascular: S1 and S2, RRR, no M  Gastrointestinal: BS+, soft, NT/ND, neg HSM,  Extremities: No peripheral edema, neg clubing, cyanosis  Vascular: 2+ peripheral pulses  Neurological: A/O x 3, no focal deficits  Psychiatric: Normal mood, normal affect  Skin: No rashes      LABS:                        8.9    8.7   )-----------( 264      ( 13 Nov 2017 06:39 )             29.4     11-13    141  |  104  |  22  ----------------------------<  99  3.5   |  28  |  1.20    Ca    8.2<L>      13 Nov 2017 06:39    TPro  6.2  /  Alb  2.3<L>  /  TBili  0.4  /  DBili  x   /  AST  11<L>  /  ALT  12  /  AlkPhos  64  11-13    PT/INR - ( 13 Nov 2017 06:39 )   PT: 12.2 sec;   INR: 1.12 ratio               RADIOLOGY & ADDITIONAL TESTS:

## 2017-11-13 NOTE — PROGRESS NOTE ADULT - SUBJECTIVE AND OBJECTIVE BOX
Patient is a 82y old  Female who presents with a chief complaint of weakness and pain both legs (28 Oct 2017 20:23)    INTERVAL HPI/OVERNIGHT EVENTS:  Patient Had no overnight events. Patient was seen today morning at the bedside. Patient has no complaints today. Denies fever, sob, chest pain, nausea, vomiting, diarrhea and constipation. Is tolerating diet without issue. States that she still has pain at the site of her amputation but that is is resolving with pain medications.    Vitals  T(C): 36.6 (11-13-17 @ 05:24), Max: 36.7 (11-12-17 @ 20:21)  HR: 72 (11-13-17 @ 12:06) (67 - 80)  BP: 104/66 (11-13-17 @ 12:06) (104/66 - 123/73)  RR: 18 (11-13-17 @ 05:24) (17 - 18)  SpO2: 93% (11-13-17 @ 12:06) (92% - 95%)  Wt(kg): --  I&O's Summary    12 Nov 2017 07:01  -  13 Nov 2017 07:00  --------------------------------------------------------  IN: 200 mL / OUT: 0 mL / NET: 200 mL    13 Nov 2017 07:01  -  13 Nov 2017 13:44  --------------------------------------------------------  IN: 360 mL / OUT: 0 mL / NET: 360 mL        REVIEW OF SYSTEMS:  CONSTITUTIONAL: denies fever, chills, fatigue, weakness  HEENT: denies blurred vision, sore throat  SKIN: denies new lesions, rash  CARDIOVASCULAR: denies chest pain, chest pressure, palpitations  RESPIRATORY: denies shortness of breath, sputum production  GASTROINTESTINAL: denies nausea, vomiting, diarrhea, abdominal pain, dyschezia  GENITOURINARY: denies dysuria, discharge  NEUROLOGICAL: denies numbness, headache, focal weakness  MUSCULOSKELETAL: denies new joint pain, muscle aches  PSYCH: denies anxiety depression at this time    PHYSICAL EXAM:  GENERAL: NAD,   HEAD:  Atraumatic, Normocephalic  EYES: EOMI, fixed dilated pupil on the left  NERVOUS SYSTEM:  Alert & Oriented X3, poor concentration; Motor Strength 5/5 B/L upper and lower extremities;   CHEST/LUNG: Clear to percussion bilaterally; No rales, rhonchi, wheezing, or rubs  HEART: regular rate and rhythm; No murmurs, rubs, or gallops  ABDOMEN: Soft, Nontender, Nondistended; Bowel sounds present  EXTREMITIES:  trace Peripheral Pulses in bilateral lower extremities, b/l feet are warm, tender to palpation on the right foot at the site of her amputation  SKIN: right foot, currently wrapped with pearl. no drainage noted at site      MEDICATIONS  (STANDING):  atorvastatin 40 milliGRAM(s) Oral at bedtime  ceFAZolin   IVPB 1000 milliGRAM(s) IV Intermittent every 8 hours  diltiazem    Tablet 60 milliGRAM(s) Oral every 6 hours  docusate sodium 100 milliGRAM(s) Oral three times a day  gabapentin 100 milliGRAM(s) Oral three times a day  losartan 100 milliGRAM(s) Oral daily  metoprolol     tartrate 100 milliGRAM(s) Oral two times a day  multivitamin 1 Tablet(s) Oral daily  nystatin Powder 1 Application(s) Topical two times a day  pantoprazole  Injectable 40 milliGRAM(s) IV Push every 12 hours    MEDICATIONS  (PRN):  acetaminophen   Tablet. 650 milliGRAM(s) Oral every 6 hours PRN Mild Pain (1 - 3)  ondansetron Injectable 4 milliGRAM(s) IV Push once PRN Nausea and/or Vomiting  oxyCODONE    IR 10 milliGRAM(s) Oral every 4 hours PRN Severe Pain (7 - 10)  oxyCODONE    IR 5 milliGRAM(s) Oral every 4 hours PRN Moderate Pain (4 - 6)      LABS:                        8.9    8.7   )-----------( 264      ( 13 Nov 2017 06:39 )             29.4     11-13    141  |  104  |  22  ----------------------------<  99  3.5   |  28  |  1.20    Ca    8.2<L>      13 Nov 2017 06:39    TPro  6.2  /  Alb  2.3<L>  /  TBili  0.4  /  DBili  x   /  AST  11<L>  /  ALT  12  /  AlkPhos  64  11-13    PT/INR - ( 13 Nov 2017 06:39 )   PT: 12.2 sec;   INR: 1.12 ratio             CAPILLARY BLOOD GLUCOSE          11-09 @ 20:44   Few Staphylococcus aureus  --  Staphylococcus aureus  11-09 @ 20:43   Growth in fluid media only Coag Negative Staphylococcus  --  Coag Negative Staphylococcus          RADIOLOGY & ADDITIONAL TESTS:    Imaging Personally Reviewed:       Advance Directives:      Palliative Care: Patient is a 82y old  Female who presents with a chief complaint of weakness and pain both legs (28 Oct 2017 20:23)    INTERVAL HPI/OVERNIGHT EVENTS:  Patient Had no overnight events. Patient was seen today morning at the bedside. Patient has no complaints today. Denies fever, sob, chest pain, nausea, vomiting, diarrhea and constipation. Is tolerating diet without issue. States that she still has pain at the site of her amputation but that is is resolving with pain medications.    Vitals  T(C): 36.6 (11-13-17 @ 05:24), Max: 36.7 (11-12-17 @ 20:21)  HR: 72 (11-13-17 @ 12:06) (67 - 80)  BP: 104/66 (11-13-17 @ 12:06) (104/66 - 123/73)  RR: 18 (11-13-17 @ 05:24) (17 - 18)  SpO2: 93% (11-13-17 @ 12:06) (92% - 95%)  Wt(kg): --  I&O's Summary    12 Nov 2017 07:01  -  13 Nov 2017 07:00  --------------------------------------------------------  IN: 200 mL / OUT: 0 mL / NET: 200 mL    13 Nov 2017 07:01  -  13 Nov 2017 13:44  --------------------------------------------------------  IN: 360 mL / OUT: 0 mL / NET: 360 mL        REVIEW OF SYSTEMS:  CONSTITUTIONAL: denies fever, chills, fatigue, weakness  HEENT: denies blurred vision, sore throat  SKIN: denies new lesions, rash  CARDIOVASCULAR: denies chest pain, chest pressure, palpitations  RESPIRATORY: denies shortness of breath, sputum production  GASTROINTESTINAL: denies nausea, vomiting, diarrhea, abdominal pain, dyschezia  GENITOURINARY: denies dysuria, discharge  NEUROLOGICAL: denies numbness, headache, focal weakness  MUSCULOSKELETAL: admits right foot pain from surgery  denies new joint pain, muscle aches  PSYCH: denies anxiety depression at this time    PHYSICAL EXAM:  GENERAL: NAD,   HEAD:  Atraumatic, Normocephalic  EYES: EOMI, fixed dilated pupil on the left  NERVOUS SYSTEM:  Alert & Oriented X3, poor concentration; Motor Strength 5/5 B/L upper and lower extremities;   CHEST/LUNG: Clear to percussion bilaterally; No rales, rhonchi, wheezing, or rubs  HEART: regular rate and rhythm; No murmurs, rubs, or gallops  ABDOMEN: Soft, Nontender, Nondistended; Bowel sounds present  EXTREMITIES:  trace Peripheral Pulses in bilateral lower extremities, b/l feet are warm, tender to palpation on the right foot at the site of her amputation  SKIN: right foot, currently wrapped with pearl. no drainage noted at site      MEDICATIONS  (STANDING):  atorvastatin 40 milliGRAM(s) Oral at bedtime  ceFAZolin   IVPB 1000 milliGRAM(s) IV Intermittent every 8 hours  diltiazem    Tablet 60 milliGRAM(s) Oral every 6 hours  docusate sodium 100 milliGRAM(s) Oral three times a day  gabapentin 100 milliGRAM(s) Oral three times a day  losartan 100 milliGRAM(s) Oral daily  metoprolol     tartrate 100 milliGRAM(s) Oral two times a day  multivitamin 1 Tablet(s) Oral daily  nystatin Powder 1 Application(s) Topical two times a day  pantoprazole  Injectable 40 milliGRAM(s) IV Push every 12 hours    MEDICATIONS  (PRN):  acetaminophen   Tablet. 650 milliGRAM(s) Oral every 6 hours PRN Mild Pain (1 - 3)  ondansetron Injectable 4 milliGRAM(s) IV Push once PRN Nausea and/or Vomiting  oxyCODONE    IR 10 milliGRAM(s) Oral every 4 hours PRN Severe Pain (7 - 10)  oxyCODONE    IR 5 milliGRAM(s) Oral every 4 hours PRN Moderate Pain (4 - 6)      LABS:                        8.9    8.7   )-----------( 264      ( 13 Nov 2017 06:39 )             29.4     11-13    141  |  104  |  22  ----------------------------<  99  3.5   |  28  |  1.20    Ca    8.2<L>      13 Nov 2017 06:39    TPro  6.2  /  Alb  2.3<L>  /  TBili  0.4  /  DBili  x   /  AST  11<L>  /  ALT  12  /  AlkPhos  64  11-13    PT/INR - ( 13 Nov 2017 06:39 )   PT: 12.2 sec;   INR: 1.12 ratio             CAPILLARY BLOOD GLUCOSE          11-09 @ 20:44   Few Staphylococcus aureus  --  Staphylococcus aureus  11-09 @ 20:43   Growth in fluid media only Coag Negative Staphylococcus  --  Coag Negative Staphylococcus          RADIOLOGY & ADDITIONAL TESTS:    Imaging Personally Reviewed:       Advance Directives:      Palliative Care:

## 2017-11-13 NOTE — PROGRESS NOTE ADULT - ASSESSMENT
82F PMHx Afib on Coumadin, HLD, CVA, MVP, OA, HTN, PVD (s/p RLE bypassx3, most recently 2012), Neuropathy, GERD presents with pain and swelling to right second toe a/w RLE cellulitis, r/o osteomyelitis. Found to have symptomatic anemia and foot infection.    -remains in SR  -continue Cardizem 60mg q6 and metoprolol 100 BID.   - Can change Cardizem to  mg po daily at time of discharge  -No evidence of acute ischemia  -no meaningful volume overload  -Extensive history of PVD and stenotic valvular disease. Now s/p toe amputation. Seems to have tolerated procedure well.  -pain control  -continue coumadin with goal INR 2-3. No bridging is necessary  -Monitor electrolytes, keep K>4, Mg>2  - d/c planning per med  -Will continue to follow

## 2017-11-13 NOTE — PROGRESS NOTE ADULT - PROBLEM SELECTOR PLAN 6
continue antibiotics as per ID  apprec ID recs continue antibiotics as above as per ID  apprec ID recs

## 2017-11-13 NOTE — PROGRESS NOTE ADULT - PROBLEM SELECTOR PLAN 5
likely source of abdominal pain - resolved s/p smog enema  continue colace likely source of abdominal pain - resolved s/p smog enema  continue colace and senna

## 2017-11-13 NOTE — PROGRESS NOTE ADULT - PROBLEM SELECTOR PLAN 4
controlled  continue cardizem and metoprolol with parameters  maintain HR < 110  restarted AC with coumadin  f/u cardiology (Dr. De Los Santos), no bridging as per cardio needed, trend pt/inro= controlled  continue cardizem and metoprolol with parameters  maintain HR < 110  continue AC with coumadin, continue 3 mg today  f/u cardiology (Dr. De Los Santos), no bridging as per cardio needed, trend pt/inr

## 2017-11-13 NOTE — PROGRESS NOTE ADULT - PROBLEM SELECTOR PLAN 2
angiogram of RLE shows extensive disease and poor perfusion but given current presence of gangrene toe that is draining, will have digital amputation.  s/p toe amputation - considering hypobaric therapy afterwards with potential for further amputation if inadequate healing. Awaiting final surgical path  continue vanco/zosyn for now. f/u vanc trough  blood cx and urine cx NTD  vascular/ID/podiatry following angiogram of RLE shows extensive disease and poor perfusion but given current presence of gangrene toe that is draining, will have digital amputation.  s/p toe amputation - considering hypobaric therapy afterwards with potential for further amputation if inadequate healing.  tissue culture showing MSSA sensitive to ancef, continue ancef awaiting final surgical pathology  blood cx and urine cx NTD  vascular/ID/podiatry following

## 2017-11-13 NOTE — PROGRESS NOTE ADULT - SUBJECTIVE AND OBJECTIVE BOX
[facility]    Dahiana Bañuelos, Evens, Toney, Terrie, Truman, Filiberto      [phone]    CHIEF COMPLAINT: Patient is a 82y old  Female who presents with a chief complaint of weakness and pain both legs (28 Oct 2017 20:23)      Follow Up: paf, pvd, anemia    Interim history: The patient reports no new symptoms.  Denies chest discomfort and shortness of breath.  No abdominal pain.  No new neurologic symptoms.      MEDICATIONS  (STANDING):  atorvastatin 40 milliGRAM(s) Oral at bedtime  ceFAZolin   IVPB 1000 milliGRAM(s) IV Intermittent every 8 hours  diltiazem    Tablet 60 milliGRAM(s) Oral every 6 hours  docusate sodium 100 milliGRAM(s) Oral three times a day  gabapentin 100 milliGRAM(s) Oral three times a day  losartan 100 milliGRAM(s) Oral daily  metoprolol     tartrate 100 milliGRAM(s) Oral two times a day  multivitamin 1 Tablet(s) Oral daily  nystatin Powder 1 Application(s) Topical two times a day  pantoprazole  Injectable 40 milliGRAM(s) IV Push every 12 hours    MEDICATIONS  (PRN):  acetaminophen   Tablet. 650 milliGRAM(s) Oral every 6 hours PRN Mild Pain (1 - 3)  ondansetron Injectable 4 milliGRAM(s) IV Push once PRN Nausea and/or Vomiting  oxyCODONE    IR 10 milliGRAM(s) Oral every 4 hours PRN Severe Pain (7 - 10)  oxyCODONE    IR 5 milliGRAM(s) Oral every 4 hours PRN Moderate Pain (4 - 6)      REVIEW OF SYSTEMS:  eye, ent, GI, , allergic, dermatologic, musculoskeletal and neurologic are negative except as described above    Vital Signs Last 24 Hrs  T(C): 36.6 (13 Nov 2017 05:24), Max: 37 (12 Nov 2017 13:27)  T(F): 97.9 (13 Nov 2017 05:24), Max: 98.6 (12 Nov 2017 13:27)  HR: 70 (13 Nov 2017 05:24) (67 - 81)  BP: 116/66 (13 Nov 2017 05:24) (110/62 - 126/62)  BP(mean): --  RR: 18 (13 Nov 2017 05:24) (17 - 18)  SpO2: 92% (13 Nov 2017 05:24) (92% - 95%)    I&O's Summary    12 Nov 2017 07:01  -  13 Nov 2017 07:00  --------------------------------------------------------  IN: 200 mL / OUT: 0 mL / NET: 200 mL        Telemetry past 24h:    PHYSICAL EXAM:    Constitutional: well-nourished, well-developed, NAD   HEENT:  MMM, sclerae anicteric, conjunctivae clear, no oral cyanosis.  Pulmonary: Non-labored, breath sounds are clear bilaterally, No wheezing, rales or rhonchi  Cardiovascular: Regular, S1 and S2.  No murmur.  No rubs, gallops or clicks  Gastrointestinal: Bowel Sounds present, soft, nontender.   Lymph: No peripheral edema.   Neurological: Alert, no focal deficits  Skin: No rashes.  Psych:  Mood & affect appropriate    LABS: All Labs Reviewed:                        8.9    8.7   )-----------( 264      ( 13 Nov 2017 06:39 )             29.4                         8.6    9.8   )-----------( 258      ( 12 Nov 2017 07:39 )             27.6                         9.5    9.3   )-----------( 287      ( 11 Nov 2017 07:15 )             30.8     13 Nov 2017 06:39    141    |  104    |  22     ----------------------------<  99     3.5     |  28     |  1.20   12 Nov 2017 07:39    139    |  104    |  23     ----------------------------<  117    3.5     |  29     |  1.20   11 Nov 2017 07:15    140    |  105    |  20     ----------------------------<  103    3.5     |  27     |  1.20     Ca    8.2        13 Nov 2017 06:39  Ca    8.2        12 Nov 2017 07:39  Ca    8.3        11 Nov 2017 07:15    TPro  6.2    /  Alb  2.3    /  TBili  0.4    /  DBili  x      /  AST  11     /  ALT  12     /  AlkPhos  64     13 Nov 2017 06:39  TPro  5.9    /  Alb  2.3    /  TBili  0.5    /  DBili  x      /  AST  10     /  ALT  14     /  AlkPhos  62     12 Nov 2017 07:39    PT/INR - ( 13 Nov 2017 06:39 )   PT: 12.2 sec;   INR: 1.12 ratio               Blood Culture: Organism Staphylococcus aureus  Gram Stain Blood -- Gram Stain   No polymorphonuclear cells seen per low power field  No organisms seen per oil power field  Specimen Source .Tissue Other, 2nd metatarsal head rt foot  Culture-Blood --    Organism Coag Negative Staphylococcus  Gram Stain Blood -- Gram Stain   No polymorphonuclear cells seen per low power field  No organisms seen per oil power field  Specimen Source .Tissue Other, 2nd toe rt foot cultu  Culture-Blood --            RADIOLOGY:    EKG:    Echo:    < from: TTE Echo Doppler w/o Cont (11.04.17 @ 08:47) >     EXAM:  ECHO TTE W/O CON COMP W/DOPPLR         PROCEDURE DATE:  11/04/2017        INTERPRETATION:  INDICATION: aortic valve disease    Blood Pressure 167/83    Height 152     Weight 51       BSA 1.5    Dimensions:    LA 3.2       Normal Values: 2.0- 4.0 cm    Ao 3.0        Normal Values: 2.0 - 3.8 cm  SEPTUM 1.0       Normal Values: 0.6 - 1.2 cm  PWT 1.0       Normal Values: 0.6 - 1.1 cm  LVIDd 3.5         Normal Values: 3.0 - 5.6 cm  LVIDs 2.6         Normal Values: 1.8 - 4.0 cm    Derived Variables:  LVMI     g/m2  RWT      Fractional Short      Ejection Fraction 65    Doppler Peak v. AoV=   (m/sec)    OBSERVATIONS:    Mitral Valve: normal, 1+ MR.  Aortic Valve/Aorta: Calcified trileaflet aortic valve. Minimal AI  Tricuspid Valve: normalwith trace TR.  Pulmonic Valve: normal  Left Atrium: normal  Right Atrium: normal  Left Ventricle: Technically limited study. As visualized, there is normal   LV size and systolic function, estimated LVEF of 65%.  Right Ventricle: Not well visualized, probably normal  Pericardium/Pleura: no significant pleural effusion, no significant   pericardial effusion.  Pulmonary/RV Pressure: Inadequate TR jet to estimate PA systolic pressure.  LV Diastolic Function: Grade 1 diastolic dysfunction.     Technically limited study. As visualized, there is normal left   ventricular size and systolic function, estimated LVEF of 65%. The right   ventricle is not well seen, but is probably normal in size with normal   systolic function. Grade 1 diastolic dysfunction. Normal biatrial size.   The aortic root is normal in size. The mitral valve is structurally   normal, 1+ MR. The aortic valve is calcified without stenosis. There is   minimal aortic insufficiency. Trace physiologic TR. Inadequate TR jet to   estimate PA systolic pressure. No significant pericardial effusion.                  FREDI DALAL M.D., ATTENDING CARDIOLOGIST  This document has been electronically signed. Nov 4 2017  5:20PM                < end of copied text >

## 2017-11-13 NOTE — PROGRESS NOTE ADULT - SUBJECTIVE AND OBJECTIVE BOX
CARITO CONSTANTINO is a 82yFemale , patient examined and chart reviewed. 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  Osteoarthritis  Afib  PVD (peripheral vascular disease)  Neuropathy: (Right lower leg)  H/O cerebral aneurysm repair: brain clips  CVA (cerebral vascular accident): (&quot;Mini-stroke&quot;,1990&#x27;s)  Rheumatoid arthritis  Hyperlipidemia  Hypertension  S/P cataract surgery: (Left eye)  Elective surgery: (&quot;Twisted bowel&quot;, 2014)  Elective surgery: (Exision of cyst on liver, 1985)  S/P ORIF (open reduction internal fixation) fracture: Left hip fx (2012) &amp; R hip fx (2013)  H/O cerebral aneurysm repair: Brain clips (1978(  Renal stone: Cysto stent placement 10/1/2014  PVD (peripheral vascular disease): s/p RLE bypass x 3, most recent 3/2012 Right external iliac to PT bypass w/ PTFE (2012)  S/P FRED (total abdominal hysterectomy): (1987, Hx of &quot;ovarian cancer?&quot;)      For details regarding the patient's social history, family history, and other miscellaneous elements, please refer the initial infectious diseases consultation and/or the admitting history and physical examination for this admission.    ROS:  CONSTITUTIONAL:  Negative fever or chills, feels well, good appetite  EYES:  Negative  blurry vision or double vision  CARDIOVASCULAR:  Negative for chest pain or palpitations  RESPIRATORY:  Negative for cough, wheezing, or SOB   GASTROINTESTINAL:  Negative for nausea, vomiting, diarrhea, constipation, or abdominal pain  GENITOURINARY:  Negative frequency, urgency or dysuria  NEUROLOGIC:  No headache, confusion, dizziness, lightheadedness  All other systems were reviewed and are negative         Current inpatient medications :    ANTIBIOTICS/RELEVANT:  ceFAZolin   IVPB 1000 milliGRAM(s) IV Intermittent every 8 hours      acetaminophen   Tablet. 650 milliGRAM(s) Oral every 6 hours PRN  atorvastatin 40 milliGRAM(s) Oral at bedtime  diltiazem    Tablet 60 milliGRAM(s) Oral every 6 hours  docusate sodium 100 milliGRAM(s) Oral three times a day  gabapentin 100 milliGRAM(s) Oral three times a day  losartan 100 milliGRAM(s) Oral daily  metoprolol     tartrate 100 milliGRAM(s) Oral two times a day  multivitamin 1 Tablet(s) Oral daily  nystatin Powder 1 Application(s) Topical two times a day  ondansetron Injectable 4 milliGRAM(s) IV Push once PRN  oxyCODONE    IR 10 milliGRAM(s) Oral every 4 hours PRN  oxyCODONE    IR 5 milliGRAM(s) Oral every 4 hours PRN  pantoprazole  Injectable 40 milliGRAM(s) IV Push every 12 hours  senna 2 Tablet(s) Oral at bedtime  warfarin 3 milliGRAM(s) Oral once      Objective:    11-12 @ 07:01  -  11-13 @ 07:00  --------------------------------------------------------  IN: 200 mL / OUT: 0 mL / NET: 200 mL    11-13 @ 07:01  -  11-13 @ 18:46  --------------------------------------------------------  IN: 360 mL / OUT: 0 mL / NET: 360 mL      T(C): 36.8 (11-13-17 @ 14:00), Max: 36.8 (11-13-17 @ 14:00)  HR: 74 (11-13-17 @ 14:00) (67 - 74)  BP: 99/65 (11-13-17 @ 14:00) (99/65 - 123/73)  RR: 18 (11-13-17 @ 14:00) (17 - 18)  SpO2: 94% (11-13-17 @ 14:00) (92% - 95%)  Wt(kg): --      Physical Exam:  General: well developed well nourished, in no acute distress  Eyes: sclera anicteric, pupils equal and reactive to light  ENMT: buccal mucosa moist, pharynx not injected  Neck: supple, trachea midline  Lungs: clear, no wheeze/rhonchi  Cardiovascular: regular rate and rhythm, S1 S2  Abdomen: soft, nontender, no organomegaly present, bowel sounds normal  Neurological: alert and oriented x3, Cranial Nerves II-XII grossly intact  Skin: no increased ecchymosis/petechiae/purpura  Lymph Nodes: no palpable cervical/supraclavicular lymph nodes enlargements  Extremities: no cyanosis/clubbing/edema, right foot- as per podiatry - Derm: surgical site suture intact without wound dehiscences, no malodor, skin is thin, atrophic and shiny, no ischemic changes noted, slight erythema  on dorsum of 3rd MPJ right foot            LABS:                          8.9    8.7   )-----------( 264      ( 13 Nov 2017 06:39 )             29.4       11-13    141  |  104  |  22  ----------------------------<  99  3.5   |  28  |  1.20    Ca    8.2<L>      13 Nov 2017 06:39    TPro  6.2  /  Alb  2.3<L>  /  TBili  0.4  /  DBili  x   /  AST  11<L>  /  ALT  12  /  AlkPhos  64  11-13      PT/INR - ( 13 Nov 2017 06:39 )   PT: 12.2 sec;   INR: 1.12 ratio       Surgical Pathology Final Report -  (11.09.17 @ 14:42)    Surgical Pathology Final Report - :   ACCESSION No:  [id number]    CARITO CONSTANTINO                      2        Surgical Final Report          Final Diagnosis    1. Digit, 2nd toe, right foot, amputation:  - Bone demonstrating acute osteomyelitis.  - Skin and soft tissue demonstrating ulceration, acute  necrotizing inflammation, fibrosis and reactive changes.  - Bone, skin and soft tissue margins are free of  osteomyelitis/inflammation.    2. Metatarsal head, 2nd, right foot, excision:  -  Benign cartilage capped bone and soft tissue  demonstrating fibrosis and reactive changes, negative for  osteomyelitis/inflammation.    Rajinder Boudreaux M.D.  (Electronic Signature)  Reported on: 11/13/17          RECENT CULTURES:    Culture - Tissue with Gram Stain (11.09.17 @ 20:44)    -  Ampicillin/Sulbactam: S <=8/4    -  Cefazolin: S <=4    -  Ciprofloxacin: S <=1    -  Clindamycin: S <=0.25    -  Gentamicin: S <=1    -  Erythromycin: S <=0.25    -  Moxifloxacin(Aerobic): S <=0.5    -  Oxacillin: S <=0.25    -  RIF- Rifampin: S <=1    -  Tetra/Doxy: S <=1    -  Trimethoprim/Sulfamethoxazole: S <=0.5/9.5    -  Vancomycin: S 2    Gram Stain:   No polymorphonuclear cells seen per low power field  No organisms seen per oil power field    -  Levofloxacin: S <=0.5    -  Penicillin: R 8    Specimen Source: .Tissue Other, 2nd metatarsal head rt foot    Culture Results:   Few Staphylococcus aureus    Organism Identification: Staphylococcus aureus    Organism: Staphylococcus aureus    Method Type: KWABENA         Assessment:  82 y.o. female with multiple medical problems Afib on Coumadin, HLD, CVA, MVP, OA, HTN, PVD (s/p RLE bypassx3, most recently 2012), Neuropathy, GERD  presents with  ischemic right foot and leg coupled with gangrene of right 2nd. toe.  She has multiple failed bypasses of right leg. 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 Her pain is out of proportion to the toe infection and likely is related to ischemic pain right leg .    She is scheduled for angiogram of right leg as recommended by vascular surgery , podiatry still feels the gangrenous toe should be amputated and then try HBOT     She has fecal impaction , most likely cause of abdominal pain.     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 She has severe PVD with occluded grafts , she is s/p resection of the right   2nd toe thru the metatarsal head     Plan :   - will change to Ancef 1 gram q 8 hours as it is MSSA, will discuss with Podiatry if patient needs home IV antibiotics till wound heals as she has severe PVD and may not be appropriate  from po antibiotics   - wound care as per podiatry   - pain control ? use of fentanyl patch   - trend renal function and CBC  - overall prognosis is poor. Discussed with Dr. Casiano     Continue with present regime .  Appropriate use of antibiotics and adverse effects reviewed.    I have discussed the above plan of care with patient and  family in detail. They expressed understanding of the  treatment plan . Risks, benefits and alternatives discussed in detail. I have asked if they have any questions or concerns and appropriately addressed them to the best of my ability .    > 35 minutes were spent in direct patient care reviewing notes, medications ,labs data/ imaging , discussion with multidisciplinary team.    Thank you for allowing me to participate in care of your patient .    Judi Coyne MD  [phone]

## 2017-11-13 NOTE — PROGRESS NOTE ADULT - SUBJECTIVE AND OBJECTIVE BOX
81 yo Female with PMH of HLD, CVA, MVP, OA, HTN, PVD, Neuropathy, GERD with chief complaint of Right 2nd digit cellulitis. Pt admits throbbing pain last night to right 2nd digit. 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 Pt admits pain to right foot time to time. Pt denies N/V/F/C at this time.     H&P from ED: "82F PMHx Afib on Coumadin, HLD, CVA, MVP, OA, HTN, PVD (s/p RLE bypassx3, most recently 2012), Neuropathy, GERD presents with pain and swelling to right second toe that started 1 month ago. Patient states foot became red, painful, and a small wound with a scab started forming on the top portion of her second toe. It became extremely difficult to bear weight on the foot so she went to see Dr. Isaacs at the wound center and he drained the wound at which point, yellow pus drained from the wound. He prescribed her an antibiotic (she does not recall which one). She denies any fevers, chills, chesst pain or shortness of breat. Admits to joint pain due to her RA."    Vital Signs Last 24 Hrs  T(C): 36.6 (13 Nov 2017 05:24), Max: 36.7 (12 Nov 2017 20:21)  T(F): 97.9 (13 Nov 2017 05:24), Max: 98 (12 Nov 2017 20:21)  HR: 72 (13 Nov 2017 12:06) (67 - 80)  BP: 104/66 (13 Nov 2017 12:06) (104/66 - 123/73)  BP(mean): --  RR: 18 (13 Nov 2017 05:24) (17 - 18)  SpO2: 93% (13 Nov 2017 12:06) (92% - 95%)    Lower Extremity Examination  Vasc: DP, PT non palpable, CFT delayed to all digits, absent pedal hair bilat, TG: WNL  Neuro: gross protective sensation intact bilat  Derm: surgical site suture intact without wound dehiscences, no malodor, skin is thin, atrophic and shiny, no ischemic changes noted, slight erythemic changes on dorsum of 3rd MPJ right foot  MSK: pain on palpation to right forefoot                          8.9    8.7   )-----------( 264      ( 13 Nov 2017 06:39 )             29.4   11-13    141  |  104  |  22  ----------------------------<  99  3.5   |  28  |  1.20    Ca    8.2<L>      13 Nov 2017 06:39    TPro  6.2  /  Alb  2.3<L>  /  TBili  0.4  /  DBili  x   /  AST  11<L>  /  ALT  12  /  AlkPhos  64  11-13

## 2017-11-14 ENCOUNTER — TRANSCRIPTION ENCOUNTER (OUTPATIENT)
Age: 82
End: 2017-11-14

## 2017-11-14 LAB
ANION GAP SERPL CALC-SCNC: 11 MMOL/L — SIGNIFICANT CHANGE UP (ref 5–17)
BUN SERPL-MCNC: 26 MG/DL — HIGH (ref 7–23)
CALCIUM SERPL-MCNC: 8.7 MG/DL — SIGNIFICANT CHANGE UP (ref 8.5–10.1)
CHLORIDE SERPL-SCNC: 103 MMOL/L — SIGNIFICANT CHANGE UP (ref 96–108)
CO2 SERPL-SCNC: 27 MMOL/L — SIGNIFICANT CHANGE UP (ref 22–31)
CREAT SERPL-MCNC: 1.4 MG/DL — HIGH (ref 0.5–1.3)
CULTURE RESULTS: SIGNIFICANT CHANGE UP
CULTURE RESULTS: SIGNIFICANT CHANGE UP
GLUCOSE SERPL-MCNC: 97 MG/DL — SIGNIFICANT CHANGE UP (ref 70–99)
HCT VFR BLD CALC: 29.6 % — LOW (ref 34.5–45)
HGB BLD-MCNC: 9.1 G/DL — LOW (ref 11.5–15.5)
INR BLD: 1.14 RATIO — SIGNIFICANT CHANGE UP (ref 0.88–1.16)
MCHC RBC-ENTMCNC: 28.5 PG — SIGNIFICANT CHANGE UP (ref 27–34)
MCHC RBC-ENTMCNC: 30.8 GM/DL — LOW (ref 32–36)
MCV RBC AUTO: 92.4 FL — SIGNIFICANT CHANGE UP (ref 80–100)
ORGANISM # SPEC MICROSCOPIC CNT: SIGNIFICANT CHANGE UP
PLATELET # BLD AUTO: 251 K/UL — SIGNIFICANT CHANGE UP (ref 150–400)
POTASSIUM SERPL-MCNC: 3.6 MMOL/L — SIGNIFICANT CHANGE UP (ref 3.5–5.3)
POTASSIUM SERPL-SCNC: 3.6 MMOL/L — SIGNIFICANT CHANGE UP (ref 3.5–5.3)
PROTHROM AB SERPL-ACNC: 12.5 SEC — SIGNIFICANT CHANGE UP (ref 9.8–12.7)
RBC # BLD: 3.2 M/UL — LOW (ref 3.8–5.2)
RBC # FLD: 14.8 % — HIGH (ref 10.3–14.5)
SODIUM SERPL-SCNC: 141 MMOL/L — SIGNIFICANT CHANGE UP (ref 135–145)
SPECIMEN SOURCE: SIGNIFICANT CHANGE UP
SPECIMEN SOURCE: SIGNIFICANT CHANGE UP
WBC # BLD: 8.3 K/UL — SIGNIFICANT CHANGE UP (ref 3.8–10.5)
WBC # FLD AUTO: 8.3 K/UL — SIGNIFICANT CHANGE UP (ref 3.8–10.5)

## 2017-11-14 PROCEDURE — 99233 SBSQ HOSP IP/OBS HIGH 50: CPT | Mod: GC

## 2017-11-14 PROCEDURE — 99233 SBSQ HOSP IP/OBS HIGH 50: CPT

## 2017-11-14 PROCEDURE — 36569 INSJ PICC 5 YR+ W/O IMAGING: CPT

## 2017-11-14 PROCEDURE — 76937 US GUIDE VASCULAR ACCESS: CPT | Mod: 26

## 2017-11-14 RX ORDER — POLYETHYLENE GLYCOL 3350 17 G/17G
17 POWDER, FOR SOLUTION ORAL DAILY
Qty: 0 | Refills: 0 | Status: DISCONTINUED | OUTPATIENT
Start: 2017-11-14 | End: 2017-11-15

## 2017-11-14 RX ORDER — DILTIAZEM HCL 120 MG
1 CAPSULE, EXT RELEASE 24 HR ORAL
Qty: 30 | Refills: 0 | OUTPATIENT
Start: 2017-11-14 | End: 2017-12-14

## 2017-11-14 RX ORDER — CEFAZOLIN SODIUM 1 G
1000 VIAL (EA) INJECTION EVERY 12 HOURS
Qty: 0 | Refills: 0 | Status: DISCONTINUED | OUTPATIENT
Start: 2017-11-14 | End: 2017-11-15

## 2017-11-14 RX ORDER — DOCUSATE SODIUM 100 MG
100 CAPSULE ORAL
Qty: 0 | Refills: 0 | Status: DISCONTINUED | OUTPATIENT
Start: 2017-11-14 | End: 2017-11-15

## 2017-11-14 RX ORDER — WARFARIN SODIUM 2.5 MG/1
5 TABLET ORAL ONCE
Qty: 0 | Refills: 0 | Status: COMPLETED | OUTPATIENT
Start: 2017-11-14 | End: 2017-11-14

## 2017-11-14 RX ORDER — PANTOPRAZOLE SODIUM 20 MG/1
1 TABLET, DELAYED RELEASE ORAL
Qty: 30 | Refills: 0 | OUTPATIENT
Start: 2017-11-14 | End: 2017-12-14

## 2017-11-14 RX ORDER — PANTOPRAZOLE SODIUM 20 MG/1
40 TABLET, DELAYED RELEASE ORAL
Qty: 0 | Refills: 0 | Status: DISCONTINUED | OUTPATIENT
Start: 2017-11-14 | End: 2017-11-15

## 2017-11-14 RX ADMIN — NYSTATIN CREAM 1 APPLICATION(S): 100000 CREAM TOPICAL at 06:04

## 2017-11-14 RX ADMIN — GABAPENTIN 300 MILLIGRAM(S): 400 CAPSULE ORAL at 13:57

## 2017-11-14 RX ADMIN — OXYCODONE HYDROCHLORIDE 10 MILLIGRAM(S): 5 TABLET ORAL at 23:10

## 2017-11-14 RX ADMIN — SENNA PLUS 2 TABLET(S): 8.6 TABLET ORAL at 22:19

## 2017-11-14 RX ADMIN — OXYCODONE HYDROCHLORIDE 5 MILLIGRAM(S): 5 TABLET ORAL at 16:48

## 2017-11-14 RX ADMIN — LOSARTAN POTASSIUM 100 MILLIGRAM(S): 100 TABLET, FILM COATED ORAL at 06:03

## 2017-11-14 RX ADMIN — ATORVASTATIN CALCIUM 40 MILLIGRAM(S): 80 TABLET, FILM COATED ORAL at 22:19

## 2017-11-14 RX ADMIN — WARFARIN SODIUM 5 MILLIGRAM(S): 2.5 TABLET ORAL at 22:22

## 2017-11-14 RX ADMIN — Medication 100 MILLIGRAM(S): at 17:40

## 2017-11-14 RX ADMIN — OXYCODONE HYDROCHLORIDE 5 MILLIGRAM(S): 5 TABLET ORAL at 11:30

## 2017-11-14 RX ADMIN — Medication 100 MILLIGRAM(S): at 06:02

## 2017-11-14 RX ADMIN — OXYCODONE HYDROCHLORIDE 10 MILLIGRAM(S): 5 TABLET ORAL at 06:13

## 2017-11-14 RX ADMIN — Medication 100 MILLIGRAM(S): at 06:03

## 2017-11-14 RX ADMIN — PANTOPRAZOLE SODIUM 40 MILLIGRAM(S): 20 TABLET, DELAYED RELEASE ORAL at 06:04

## 2017-11-14 RX ADMIN — Medication 1 TABLET(S): at 12:38

## 2017-11-14 RX ADMIN — GABAPENTIN 300 MILLIGRAM(S): 400 CAPSULE ORAL at 22:21

## 2017-11-14 RX ADMIN — GABAPENTIN 300 MILLIGRAM(S): 400 CAPSULE ORAL at 06:13

## 2017-11-14 RX ADMIN — OXYCODONE HYDROCHLORIDE 5 MILLIGRAM(S): 5 TABLET ORAL at 10:33

## 2017-11-14 RX ADMIN — OXYCODONE HYDROCHLORIDE 10 MILLIGRAM(S): 5 TABLET ORAL at 22:20

## 2017-11-14 RX ADMIN — OXYCODONE HYDROCHLORIDE 5 MILLIGRAM(S): 5 TABLET ORAL at 17:47

## 2017-11-14 NOTE — PROGRESS NOTE ADULT - ATTENDING COMMENTS
82F PMHx Afib on Coumadin, HLD, CVA, MVP, OA, HTN, PVD (s/p RLE bypassx3, most recently 2012), Neuropathy, GERD presents with ischemic ulcer of R foot 2nd digit. Incidentally found w/ nonobstructing hepatic duct stone. Plan: apprec ID recs, cont long term antibx with midline placed today, apprec podiatry wound care, likely dc plan is to [facility] rehab and hyuperbaric treatments as soon as possible

## 2017-11-14 NOTE — DISCHARGE NOTE ADULT - CARE PROVIDER_API CALL
Tahir Lockhart (DO), Family Medicine  [address]  [address]  [place] [address]  Phone: [phone]  Fax: [phone]    Aristides Laughlin), Internal Medicine  [address] [place] [address]  Phone: [phone]  Fax: [phone]    Marco Coyne), Internal Medicine  [address] [place] [address]  Phone: [phone]  Fax: [phone]    Nicholas [place] (DPM), Podiatric Medicine and Surgery  [address] [place] [address]  Phone: [phone]  Fax: [phone]

## 2017-11-14 NOTE — PROGRESS NOTE ADULT - PROBLEM SELECTOR PROBLEM 8
Dyslipidemia
Prophylactic measure
Dyslipidemia
Dyslipidemia

## 2017-11-14 NOTE — DISCHARGE NOTE ADULT - HOSPITAL COURSE
82F PMHx Afib on Coumadin, HLD, CVA (hemorrhage s/p clipping), MVP, OA, HTN, PVD (s/p RLE failed bypassx3, most recently 2012), Neuropathy, GERD presents with pain and swelling to right second toe that started 1 month ago. Patient states foot became red, painful, and a small wound with a scab started forming on the top portion of her second toe. It became extremely difficult to bear weight on the foot so she went to see Dr. Isaacs at the wound center and he drained the wound at which point, yellow pus drained from the wound. He prescribed her an antibiotic (she does not recall which one). She denies any fevers, chills, chest pain or shortness of breat. Admits to joint pain due to her RA.  In ED, pt's vital was wnl. WBC 8.2, Hct: 26.1, lactate 0.8, , CMP revealed elevated bilirubin of 1.5, remainder is wnl , INR: 2.26. CXR showed no acute pathology. X ray of Rt foot showed possible small periarticular erosion medial aspect head of the proximal phalanx which might represent infection/ osteomyelitis. Pt received 1 dose  of  vanco and 1 dose of zosyn and 1 bolus of NS, BCX, UCX was sent in ED. Patient was admitted for cellulitis of the right 2nd digit concerning of osteomyelitis. MRI was not done due to patients pmh of brain clipping. Podiatry (Dr. Temple) was consulted. Recommended amputation of he toe. ID (Dr. Mcconnell) was consulted and patient was continued on vanco and zosyn. Cardiology was consulted (Dr. Bañuelos group)  Vascular (Dr. Sánchez) was consulted given patients history of severe PAD and failed bypass grafts on the right lower extremity. IR angiogram b/l le: Multilevel occlusions involving the right common femoral artery, the superficial femoral artery and popliteal artery with collateral vessels feeding single vessel below knee. Patient was determined to be at high risk for new bypass due tor cardiac history and prior history of failed bypass grafts ipsilaterally. Results were discussed with patients vascular (Dr. Parham). Potential for AKA was discussed with the patient family however they opt for more conservative management at this time.  Over the course of the hospital stay, patient was found to be anemic requiring transfusion of 1 unit and prompting her coumadin to be held. GI (Dr. Mcconnell) and Heme/onc (Dr. Gray) were consulted. FOBT was negative and endoscopy found hiatal hernia and gastritis. Patient was started on protonix.  Patient also developed abdominal pain over the course of the hospital stay. CT abdomen showed findings of 14 mm calculus in hepatic duct and signs of fecal impaction. Abdominal pain resolved after smog enema and patient was started on bowel regiment.. ERCP was done with sphincterotomy. She also had several episodes of atrial fibrillation with RVR which was resolved with start of cardizem.    Podiatry surgery was postponed given patients clinical course and poor capability of healing. However, amputation was revisited with development of gangrene at the toe, amputation was done with surgical pathology returning showing osteomyelitis of the toe with clean margins of resection. Patient was restarted on coumadin and started on ancef with tissue cultures returning as MSSA. 82F PMHx Afib on Coumadin, HLD, CVA (hemorrhage s/p clipping), MVP, OA, HTN, PVD (s/p RLE failed bypassx3, most recently 2012), Neuropathy, GERD presents with pain and swelling to right second toe that started 1 month ago. Patient states foot became red, painful, and a small wound with a scab started forming on the top portion of her second toe. It became extremely difficult to bear weight on the foot so she went to see Dr. Isaacs at the wound center and he drained the wound at which point, yellow pus drained from the wound. He prescribed her an antibiotic (she does not recall which one). She denies any fevers, chills, chest pain or shortness of breat. Admits to joint pain due to her RA.  In ED, pt's vital was wnl. WBC 8.2, Hct: 26.1, lactate 0.8, , CMP revealed elevated bilirubin of 1.5, remainder is wnl , INR: 2.26. CXR showed no acute pathology. X ray of Rt foot showed possible small periarticular erosion medial aspect head of the proximal phalanx which might represent infection/ osteomyelitis. Pt received 1 dose  of  vanco and 1 dose of zosyn and 1 bolus of NS, BCX, UCX was sent in ED. Patient was admitted for cellulitis of the right 2nd digit concerning of osteomyelitis. MRI was not done due to patients pmh of brain clipping. Podiatry (Dr. Temple) was consulted. Recommended amputation of he toe. ID (Dr. Mcconnell) was consulted and patient was continued on vanco and zosyn. Cardiology was consulted (Dr. Bañuelos group)  Vascular (Dr. Sánchez) was consulted given patients history of severe PAD and failed bypass grafts on the right lower extremity. IR angiogram b/l le: Multilevel occlusions involving the right common femoral artery, the superficial femoral artery and popliteal artery with collateral vessels feeding single vessel below knee. Patient was determined to be at high risk for new bypass due tor cardiac history and prior history of failed bypass grafts ipsilaterally. Results were discussed with patients vascular (Dr. Parham). Potential for AKA was discussed with the patient family however they opt for more conservative management at this time.  Over the course of the hospital stay, patient was found to be anemic requiring transfusion of 1 unit and prompting her coumadin to be held. GI (Dr. Mcconnell) and Heme/onc (Dr. Gray) were consulted. FOBT was negative and endoscopy found hiatal hernia and gastritis. Patient was started on protonix.  Patient also developed abdominal pain over the course of the hospital stay. CT abdomen showed findings of 14 mm calculus in hepatic duct and signs of fecal impaction. Abdominal pain resolved after smog enema and patient was started on bowel regiment.. ERCP was done with sphincterotomy. She also had several episodes of atrial fibrillation with RVR which was resolved with start of cardizem.    Podiatry surgery was postponed given patients clinical course and poor capability of healing. However, amputation was revisited with development of gangrene at the toe, amputation was done with surgical pathology returning showing osteomyelitis of the toe with clean margins of resection. Patient was restarted on coumadin and started on ancef with tissue cultures returning as MSSA.    On day of discharge :  Patient had no overnight events. Patient was seen today morning at the bedside and offers no complaints.    Vitals    REVIEW OF SYSTEMS:  CONSTITUTIONAL: denies fever, chills, fatigue, weakness  HEENT: denies blurred vision, sore throat  SKIN: denies new lesions, rash  CARDIOVASCULAR: denies chest pain, chest pressure, palpitations  RESPIRATORY: denies shortness of breath, sputum production  GASTROINTESTINAL: denies nausea, vomiting, diarrhea, abdominal pain, dyschezia  GENITOURINARY: denies dysuria, discharge  NEUROLOGICAL: denies numbness, headache, focal weakness  MUSCULOSKELETAL: admits right foot pain from surgery  denies new joint pain, muscle aches  PSYCH: denies anxiety depression at this time    PHYSICAL EXAM:  GENERAL: NAD,   HEAD:  Atraumatic, Normocephalic  EYES: EOMI, fixed dilated pupil on the left  NERVOUS SYSTEM:  Alert & Oriented X3, poor concentration; Motor Strength 5/5 B/L upper and lower extremities;   CHEST/LUNG: Clear to percussion bilaterally; No rales, rhonchi, wheezing, or rubs  HEART: regular rate and rhythm; No murmurs, rubs, or gallops  ABDOMEN: Soft, Nontender, Nondistended; Bowel sounds present  EXTREMITIES:  trace Peripheral Pulses in bilateral lower extremities, b/l feet are warm, tender to palpation on the right foot at the site of her amputation  SKIN: right foot, currently wrapped with pearl. no drainage noted at site. No erythema, drainage or necrosis noted at incesion site 82F PMHx Afib on Coumadin, HLD, CVA (hemorrhage s/p clipping), MVP, OA, HTN, PVD (s/p RLE failed bypassx3, most recently 2012), Neuropathy, GERD presents with pain and swelling to right second toe that started 1 month ago. Patient states foot became red, painful, and a small wound with a scab started forming on the top portion of her second toe. It became extremely difficult to bear weight on the foot so she went to see Dr. Isaacs at the wound center and he drained the wound at which point, yellow pus drained from the wound. He prescribed her an antibiotic (she does not recall which one). She denies any fevers, chills, chest pain or shortness of breat. Admits to joint pain due to her RA.  In ED, pt's vital was wnl. WBC 8.2, Hct: 26.1, lactate 0.8, , CMP revealed elevated bilirubin of 1.5, remainder is wnl , INR: 2.26. CXR showed no acute pathology. X ray of Rt foot showed possible small periarticular erosion medial aspect head of the proximal phalanx which might represent infection/ osteomyelitis. Pt received 1 dose  of  vanco and 1 dose of zosyn and 1 bolus of NS, BCX, UCX was sent in ED. Patient was admitted for cellulitis of the right 2nd digit concerning of osteomyelitis. MRI was not done due to patients pmh of brain clipping. Podiatry (Dr. Temple) was consulted. Recommended amputation of he toe. ID (Dr. Mcconnell) was consulted and patient was continued on vanco and zosyn. Cardiology was consulted (Dr. Bañuelos group)  Vascular (Dr. Sánchez) was consulted given patients history of severe PAD and failed bypass grafts on the right lower extremity. IR angiogram b/l le: Multilevel occlusions involving the right common femoral artery, the superficial femoral artery and popliteal artery with collateral vessels feeding single vessel below knee. Patient was determined to be at high risk for new bypass due tor cardiac history and prior history of failed bypass grafts ipsilaterally. Results were discussed with patients vascular (Dr. Parham). Potential for AKA was discussed with the patient family however they opt for more conservative management at this time.  Over the course of the hospital stay, patient was found to be anemic requiring transfusion of 1 unit and prompting her coumadin to be held. GI (Dr. Mcconnell) and Heme/onc (Dr. Gray) were consulted. FOBT was negative and endoscopy found hiatal hernia and gastritis. Patient was started on protonix.  Patient also developed abdominal pain over the course of the hospital stay. CT abdomen showed findings of 14 mm calculus in hepatic duct and signs of fecal impaction. Abdominal pain resolved after smog enema and patient was started on bowel regiment.. ERCP was done with sphincterotomy. She also had several episodes of atrial fibrillation with RVR which was resolved with start of cardizem.    Podiatry surgery was postponed given patients clinical course and poor capability of healing. However, amputation was revisited with development of gangrene at the toe, amputation was done with surgical pathology returning showing osteomyelitis of the toe with clean margins of resection. Patient was restarted on coumadin and started on ancef with tissue cultures returning as MSSA. Patient received PICC line to continue antibiotics (right arm) without complications (to continue ancef). Patient to follow up with podiatry in one week to monitor progress of healing. No hypobaric therapy at this time until further evaluation by wound care clinic.    On day of discharge :  Patient had no overnight events. Patient was seen today morning at the bedside. She state that her pain is managed with the pain medications. She admits to slight shortness of breath and light headed-ness. She denies any chest pain, palpitations, headaches, dizziness, fever, nausea, vomiting, diarrhea or constipation. Patients viatls were obtained. BP 88/50  HR 54 SPO2 95% spo2 on rooms air. Cardiac and lung exam were negative (as below) and patient was later ambulating without SOB. Patient was given a 500cc bolus of NS. Repeat BP was 107/68 and patient denies further SOB.    Vitals  Vital Signs Last 24 Hrs  T(C): 36.6 (15 Nov 2017 05:08), Max: 36.9 (14 Nov 2017 20:30)  T(F): 97.8 (15 Nov 2017 05:08), Max: 98.4 (14 Nov 2017 20:30)  HR: 59 (15 Nov 2017 12:08) (59 - 75)  BP: 91/50 (15 Nov 2017 12:08) (91/50 - 122/77)  BP(mean): --  RR: 17 (15 Nov 2017 05:08) (16 - 17)  SpO2: 97% (15 Nov 2017 05:08) (93% - 97%)    REVIEW OF SYSTEMS:  CONSTITUTIONAL: denies fever, chills, fatigue, weakness  HEENT: denies blurred vision, sore throat  SKIN: denies new lesions, rash  CARDIOVASCULAR: denies chest pain, chest pressure, palpitations  RESPIRATORY: denies shortness of breath, sputum production  GASTROINTESTINAL: denies nausea, vomiting, diarrhea, abdominal pain, dyschezia  GENITOURINARY: denies dysuria, discharge  NEUROLOGICAL: denies numbness, headache, focal weakness  MUSCULOSKELETAL: admits right foot pain from surgery that improves with pain medication denies new joint pain, muscle aches  PSYCH: denies anxiety depression at this time    PHYSICAL EXAM:  GENERAL: NAD,   HEAD:  Atraumatic, Normocephalic  EYES: EOMI, fixed dilated pupil on the left  NERVOUS SYSTEM:  Alert & Oriented X3, poor concentration; Motor Strength 5/5 B/L upper and lower extremities;   CHEST/LUNG: Clear to percussion bilaterally; No rales, rhonchi, wheezing, or rubs  HEART: regular rate and rhythm; No murmurs, rubs, or gallops  ABDOMEN: Soft, Nontender, Nondistended; Bowel sounds present  EXTREMITIES:  trace Peripheral Pulses in bilateral lower extremities, b/l feet are warm, tender to palpation on the right foot at the site of her amputation  SKIN: right foot, currently wrapped with pearl. no drainage noted at site. No erythema, drainage or necrosis noted at incision site    Patient is stable for discharge to [facility] with appropriate follow up. 82F PMHx Afib on Coumadin, HLD, CVA (hemorrhage s/p clipping), MVP, OA, HTN, PVD (s/p RLE failed bypassx3, most recently 2012), Neuropathy, GERD presents with pain and swelling to right second toe that started 1 month ago. Patient states foot became red, painful, and a small wound with a scab started forming on the top portion of her second toe. It became extremely difficult to bear weight on the foot so she went to see Dr. Isaacs at the wound center and he drained the wound at which point, yellow pus drained from the wound. He prescribed her an antibiotic (she does not recall which one). She denies any fevers, chills, chest pain or shortness of breat. Admits to joint pain due to her RA.  In ED, pt's vital was wnl. WBC 8.2, Hct: 26.1, lactate 0.8, , CMP revealed elevated bilirubin of 1.5, remainder is wnl , INR: 2.26. CXR showed no acute pathology. X ray of Rt foot showed possible small periarticular erosion medial aspect head of the proximal phalanx which might represent infection/ osteomyelitis. Pt received 1 dose  of  vanco and 1 dose of zosyn and 1 bolus of NS, BCX, UCX was sent in ED. Patient was admitted for cellulitis of the right 2nd digit concerning of osteomyelitis. MRI was not done due to patients pmh of brain clipping. Podiatry (Dr. Temple) was consulted. Recommended amputation of he toe. ID (Dr. Mcconnell) was consulted and patient was continued on vanco and zosyn. Cardiology was consulted (Dr. Bañuelos group)  Vascular (Dr. Sánchez) was consulted given patients history of severe PAD and failed bypass grafts on the right lower extremity. IR angiogram b/l le: Multilevel occlusions involving the right common femoral artery, the superficial femoral artery and popliteal artery with collateral vessels feeding single vessel below knee. Patient was determined to be at high risk for new bypass due tor cardiac history and prior history of failed bypass grafts ipsilaterally. Results were discussed with patients vascular (Dr. Parham). Potential for AKA was discussed with the patient family however they opt for more conservative management at this time.  Over the course of the hospital stay, patient was found to be anemic requiring transfusion of 1 unit and prompting her coumadin to be held. GI (Dr. Mcconnell) and Heme/onc (Dr. Gray) were consulted. FOBT was negative and endoscopy found hiatal hernia and gastritis. Patient was started on protonix.  Patient also developed abdominal pain over the course of the hospital stay. CT abdomen showed findings of 14 mm calculus in hepatic duct and signs of fecal impaction. Abdominal pain resolved after smog enema and patient was started on bowel regiment.. ERCP was done with sphincterotomy. She also had several episodes of atrial fibrillation with RVR which was resolved with start of cardizem.    Podiatry surgery was postponed given patients clinical course and poor capability of healing. However, amputation was revisited with development of gangrene at the toe, amputation was done with surgical pathology returning showing osteomyelitis of the toe with clean margins of resection. Patient was restarted on coumadin and started on ancef with tissue cultures returning as MSSA. Patient received MId-line to continue antibiotics (right arm) without complications (to continue ancef). Patient to follow up with podiatry in one week to monitor progress of healing. No hypobaric therapy at this time until further evaluation by wound care clinic.    On day of discharge :  Patient had no overnight events. Patient was seen today morning at the bedside. She state that her pain is managed with the pain medications. She admits to slight shortness of breath and light headed-ness. She denies any chest pain, palpitations, headaches, dizziness, fever, nausea, vomiting, diarrhea or constipation. Patients viatls were obtained. BP 88/50  HR 54 SPO2 95% spo2 on rooms air. Cardiac and lung exam were negative (as below) and patient was later ambulating without SOB. Patient was given a 500cc bolus of NS. Repeat BP was 107/68 and patient denies further SOB.    Vitals  Vital Signs Last 24 Hrs  T(C): 36.6 (15 Nov 2017 05:08), Max: 36.9 (14 Nov 2017 20:30)  T(F): 97.8 (15 Nov 2017 05:08), Max: 98.4 (14 Nov 2017 20:30)  HR: 59 (15 Nov 2017 12:08) (59 - 75)  BP: 91/50 (15 Nov 2017 12:08) (91/50 - 122/77)  BP(mean): --  RR: 17 (15 Nov 2017 05:08) (16 - 17)  SpO2: 97% (15 Nov 2017 05:08) (93% - 97%)    REVIEW OF SYSTEMS:  CONSTITUTIONAL: denies fever, chills, fatigue, weakness  HEENT: denies blurred vision, sore throat  SKIN: denies new lesions, rash  CARDIOVASCULAR: denies chest pain, chest pressure, palpitations  RESPIRATORY: denies shortness of breath, sputum production  GASTROINTESTINAL: denies nausea, vomiting, diarrhea, abdominal pain, dyschezia  GENITOURINARY: denies dysuria, discharge  NEUROLOGICAL: denies numbness, headache, focal weakness  MUSCULOSKELETAL: admits right foot pain from surgery that improves with pain medication denies new joint pain, muscle aches  PSYCH: denies anxiety depression at this time    PHYSICAL EXAM:  GENERAL: NAD,   HEAD:  Atraumatic, Normocephalic  EYES: EOMI, fixed dilated pupil on the left  NERVOUS SYSTEM:  Alert & Oriented X3, poor concentration; Motor Strength 5/5 B/L upper and lower extremities;   CHEST/LUNG: Clear to percussion bilaterally; No rales, rhonchi, wheezing, or rubs  HEART: regular rate and rhythm; No murmurs, rubs, or gallops  ABDOMEN: Soft, Nontender, Nondistended; Bowel sounds present  EXTREMITIES:  trace Peripheral Pulses in bilateral lower extremities, b/l feet are warm, tender to palpation on the right foot at the site of her amputation  SKIN: right foot, currently wrapped with pearl. no drainage noted at site. No erythema, drainage or necrosis noted at incision site    Patient is stable for discharge to [facility] with appropriate follow up. 82F PMHx Afib on Coumadin, HLD, CVA (hemorrhage s/p clipping), MVP, OA, HTN, PVD (s/p RLE failed bypassx3, most recently 2012), Neuropathy, GERD presents with pain and swelling to right second toe that started 1 month ago. Patient states foot became red, painful, and a small wound with a scab started forming on the top portion of her second toe. It became extremely difficult to bear weight on the foot so she went to see Dr. Isaacs at the wound center and he drained the wound at which point, yellow pus drained from the wound. He prescribed her an antibiotic (she does not recall which one). She denies any fevers, chills, chest pain or shortness of breat. Admits to joint pain due to her RA.  In ED, pt's vital was wnl. WBC 8.2, Hct: 26.1, lactate 0.8, , CMP revealed elevated bilirubin of 1.5, remainder is wnl , INR: 2.26. CXR showed no acute pathology. X ray of Rt foot showed possible small periarticular erosion medial aspect head of the proximal phalanx which might represent infection/ osteomyelitis. Pt received 1 dose  of  vanco and 1 dose of zosyn and 1 bolus of NS, BCX, UCX was sent in ED. Patient was admitted for cellulitis of the right 2nd digit concerning of osteomyelitis. MRI was not done due to patients pmh of brain clipping. Podiatry (Dr. Temple) was consulted. Recommended amputation of he toe. ID (Dr. Mcconnell) was consulted and patient was continued on vanco and zosyn. Cardiology was consulted (Dr. Bañuelos group)  Vascular (Dr. Sánchez) was consulted given patients history of severe PAD and failed bypass grafts on the right lower extremity. IR angiogram b/l le: Multilevel occlusions involving the right common femoral artery, the superficial femoral artery and popliteal artery with collateral vessels feeding single vessel below knee. Patient was determined to be at high risk for new bypass due tor cardiac history and prior history of failed bypass grafts ipsilaterally. Results were discussed with patients vascular (Dr. Parham). Potential for AKA was discussed with the patient family however they opt for more conservative management at this time.  Over the course of the hospital stay, patient was found to be anemic requiring transfusion of 1 unit and prompting her coumadin to be held. GI (Dr. Mcconnell) and Heme/onc (Dr. Gray) were consulted. FOBT was negative and endoscopy found hiatal hernia and gastritis. Patient was started on protonix.  Patient also developed abdominal pain over the course of the hospital stay. CT abdomen showed findings of 14 mm calculus in hepatic duct and signs of fecal impaction. Abdominal pain resolved after smog enema and patient was started on bowel regiment.. ERCP was done with sphincterotomy. She also had several episodes of atrial fibrillation with RVR which was resolved with start of cardizem.    Podiatry surgery was postponed given patients clinical course and poor capability of healing. However, amputation was revisited with development of gangrene at the toe, amputation was done with surgical pathology returning showing osteomyelitis of the toe with clean margins of resection. Patient was restarted on coumadin and started on ancef with tissue cultures returning as MSSA. Patient received MId-line to continue antibiotics (right arm) without complications (to continue ancef). Patient to follow up with podiatry in one week to monitor progress of healing. No hypobaric therapy at this time until further evaluation by wound care clinic.    On day of discharge :  Patient had no overnight events. Patient was seen today morning at the bedside. She state that her pain is managed with the pain medications. She admits to slight shortness of breath and light headed-ness. She denies any chest pain, palpitations, headaches, dizziness, fever, nausea, vomiting, diarrhea or constipation. Patients viatls were obtained. BP 88/50  HR 54 SPO2 95% spo2 on rooms air. Cardiac and lung exam were negative (as below) and patient was later ambulating without SOB. Patient was given a 500cc bolus of NS. Repeat BP was 107/68 and patient denies further SOB. Her amlodipine was discontinued as well.    Vitals  Vital Signs Last 24 Hrs  T(C): 36.6 (15 Nov 2017 05:08), Max: 36.9 (14 Nov 2017 20:30)  T(F): 97.8 (15 Nov 2017 05:08), Max: 98.4 (14 Nov 2017 20:30)  HR: 59 (15 Nov 2017 12:08) (59 - 75)  BP: 91/50 (15 Nov 2017 12:08) (91/50 - 122/77)  BP(mean): --  RR: 17 (15 Nov 2017 05:08) (16 - 17)  SpO2: 97% (15 Nov 2017 05:08) (93% - 97%)    REVIEW OF SYSTEMS:  CONSTITUTIONAL: denies fever, chills, fatigue, weakness  HEENT: denies blurred vision, sore throat  SKIN: denies new lesions, rash  CARDIOVASCULAR: denies chest pain, chest pressure, palpitations  RESPIRATORY: denies shortness of breath, sputum production  GASTROINTESTINAL: denies nausea, vomiting, diarrhea, abdominal pain, dyschezia  GENITOURINARY: denies dysuria, discharge  NEUROLOGICAL: denies numbness, headache, focal weakness  MUSCULOSKELETAL: admits right foot pain from surgery that improves with pain medication denies new joint pain, muscle aches  PSYCH: denies anxiety depression at this time    PHYSICAL EXAM:  GENERAL: NAD,   HEAD:  Atraumatic, Normocephalic  EYES: EOMI, fixed dilated pupil on the left  NERVOUS SYSTEM:  Alert & Oriented X3, poor concentration; Motor Strength 5/5 B/L upper and lower extremities;   CHEST/LUNG: Clear to percussion bilaterally; No rales, rhonchi, wheezing, or rubs  HEART: regular rate and rhythm; No murmurs, rubs, or gallops  ABDOMEN: Soft, Nontender, Nondistended; Bowel sounds present  EXTREMITIES:  trace Peripheral Pulses in bilateral lower extremities, b/l feet are warm, tender to palpation on the right foot at the site of her amputation  SKIN: right foot, currently wrapped with pearl. no drainage noted at site. No erythema, drainage or necrosis noted at incision site    Patient is stable for discharge to [facility] with appropriate follow up. 82F PMHx Afib on Coumadin, HLD, CVA (hemorrhage s/p clipping), MVP, OA, HTN, PVD (s/p RLE failed bypassx3, most recently 2012), Neuropathy, GERD presents with pain and swelling to right second toe that started 1 month ago. Patient states foot became red, painful, and a small wound with a scab started forming on the top portion of her second toe. It became extremely difficult to bear weight on the foot so she went to see Dr. Isaacs at the wound center and he drained the wound at which point, yellow pus drained from the wound. He prescribed her an antibiotic (she does not recall which one). She denies any fevers, chills, chest pain or shortness of breat. Admits to joint pain due to her RA.  In ED, pt's vital was wnl. WBC 8.2, Hct: 26.1, lactate 0.8, , CMP revealed elevated bilirubin of 1.5, remainder is wnl , INR: 2.26. CXR showed no acute pathology. X ray of Rt foot showed possible small periarticular erosion medial aspect head of the proximal phalanx which might represent infection/ osteomyelitis. Pt received 1 dose  of  vanco and 1 dose of zosyn and 1 bolus of NS, BCX, UCX was sent in ED. Patient was admitted for cellulitis of the right 2nd digit concerning of osteomyelitis. MRI was not done due to patients pmh of brain clipping. Podiatry (Dr. Temple) was consulted. Recommended amputation of he toe. ID (Dr. Mcconnell) was consulted and patient was continued on vanco and zosyn. Cardiology was consulted (Dr. Bañuelos group)  Vascular (Dr. Sánchez) was consulted given patients history of severe PAD and failed bypass grafts on the right lower extremity. IR angiogram b/l le: Multilevel occlusions involving the right common femoral artery, the superficial femoral artery and popliteal artery with collateral vessels feeding single vessel below knee. Patient was determined to be at high risk for new bypass due tor cardiac history and prior history of failed bypass grafts ipsilaterally. Results were discussed with patients vascular (Dr. Parham). Potential for AKA was discussed with the patient family however they opt for more conservative management at this time.  Over the course of the hospital stay, patient was found to be anemic requiring transfusion of 1 unit and prompting her coumadin to be held. GI (Dr. Mcconnell) and Heme/onc (Dr. Gray) were consulted. FOBT was negative and endoscopy found hiatal hernia and gastritis. Patient was started on protonix.  Patient also developed abdominal pain over the course of the hospital stay. CT abdomen showed findings of 14 mm calculus in hepatic duct and signs of fecal impaction. Abdominal pain resolved after smog enema and patient was started on bowel regiment.. ERCP was done with sphincterotomy. She also had several episodes of atrial fibrillation with RVR which was resolved with start of cardizem.    Podiatry surgery was postponed given patients clinical course and poor capability of healing. However, amputation was revisited with development of gangrene at the toe, amputation was done with surgical pathology returning showing osteomyelitis of the toe with clean margins of resection. Patient was restarted on coumadin and started on ancef with tissue cultures returning as MSSA. Patient received MId-line to continue antibiotics (right arm) without complications (to continue ancef). Patient to follow up with podiatry in one week to monitor progress of healing. No hypobaric therapy at this time until further evaluation by wound care clinic.    On day of discharge :  Patient had no overnight events. Patient was seen today morning at the bedside. She state that her pain is managed with the pain medications. She admits to slight shortness of breath and light headed-ness. She denies any chest pain, palpitations, headaches, dizziness, fever, nausea, vomiting, diarrhea or constipation. Patients viatls were obtained. BP 88/50  HR 54 SPO2 95% spo2 on rooms air. Cardiac and lung exam were negative (as below) and patient was later ambulating without SOB. Patient was given a 500cc bolus of NS. Repeat BP was 107/68 and patient denies further SOB. Her amlodipine was discontinued as well.    Vitals  Vital Signs Last 24 Hrs  T(C): 36.6 (15 Nov 2017 05:08), Max: 36.9 (14 Nov 2017 20:30)  T(F): 97.8 (15 Nov 2017 05:08), Max: 98.4 (14 Nov 2017 20:30)  HR: 59 (15 Nov 2017 12:08) (59 - 75)  BP: 91/50 (15 Nov 2017 12:08) (91/50 - 122/77)  BP(mean): --  RR: 17 (15 Nov 2017 05:08) (16 - 17)  SpO2: 97% (15 Nov 2017 05:08) (93% - 97%)    REVIEW OF SYSTEMS:  CONSTITUTIONAL: denies fever, chills, fatigue, weakness  HEENT: denies blurred vision, sore throat  SKIN: denies new lesions, rash  CARDIOVASCULAR: denies chest pain, chest pressure, palpitations  RESPIRATORY: denies shortness of breath, sputum production  GASTROINTESTINAL: denies nausea, vomiting, diarrhea, abdominal pain, dyschezia  GENITOURINARY: denies dysuria, discharge  NEUROLOGICAL: denies numbness, headache, focal weakness  MUSCULOSKELETAL: admits right foot pain from surgery that improves with pain medication denies new joint pain, muscle aches  PSYCH: denies anxiety depression at this time    PHYSICAL EXAM:  GENERAL: NAD,   HEAD:  Atraumatic, Normocephalic  EYES: EOMI, fixed dilated pupil on the left  NERVOUS SYSTEM:  Alert & Oriented X3, poor concentration; Motor Strength 5/5 B/L upper and lower extremities;   CHEST/LUNG: Clear to percussion bilaterally; No rales, rhonchi, wheezing, or rubs  HEART: regular rate and rhythm; No murmurs, rubs, or gallops  ABDOMEN: Soft, Nontender, Nondistended; Bowel sounds present  EXTREMITIES:  trace Peripheral Pulses in bilateral lower extremities, b/l feet are warm, tender to palpation on the right foot at the site of her amputation  SKIN: right foot, currently wrapped with pearl. no drainage noted at site. No erythema, drainage or necrosis noted at incision site    Patient is stable for discharge to [facility] with appropriate follow up.    PMD to be informed  Time spent: 65 minutesst 82F PMHx Afib on Coumadin, HLD, CVA (hemorrhage s/p clipping), MVP, OA, HTN, PVD (s/p RLE failed bypassx3, most recently 2012), Neuropathy, GERD presents with pain and swelling to right second toe that started 1 month ago. Patient states foot became red, painful, and a small wound with a scab started forming on the top portion of her second toe. It became extremely difficult to bear weight on the foot so she went to see Dr. Isaacs at the wound center and he drained the wound at which point, yellow pus drained from the wound. He prescribed her an antibiotic (she does not recall which one). She denies any fevers, chills, chest pain or shortness of breat. Admits to joint pain due to her RA.  In ED, pt's vital was wnl. WBC 8.2, Hct: 26.1, lactate 0.8, , CMP revealed elevated bilirubin of 1.5, remainder is wnl , INR: 2.26. CXR showed no acute pathology. X ray of Rt foot showed possible small periarticular erosion medial aspect head of the proximal phalanx which might represent infection/ osteomyelitis. Pt received 1 dose  of  vanco and 1 dose of zosyn and 1 bolus of NS, BCX, UCX was sent in ED. Patient was admitted for cellulitis of the right 2nd digit concerning of osteomyelitis. MRI was not done due to patients pmh of brain clipping. Podiatry (Dr. Temple) was consulted. Recommended amputation of he toe. ID (Dr. Mcconnell) was consulted and patient was continued on vanco and zosyn. Cardiology was consulted (Dr. Bañuelos group)  Vascular (Dr. Sánchez) was consulted given patients history of severe PAD and failed bypass grafts on the right lower extremity. IR angiogram b/l le: Multilevel occlusions involving the right common femoral artery, the superficial femoral artery and popliteal artery with collateral vessels feeding single vessel below knee. Patient was determined to be at high risk for new bypass due tor cardiac history and prior history of failed bypass grafts ipsilaterally. Results were discussed with patients vascular (Dr. Parham). Potential for AKA was discussed with the patient family however they opt for more conservative management at this time.  Over the course of the hospital stay, patient was found to be anemic requiring transfusion of 1 unit and prompting her coumadin to be held. GI (Dr. Mcconnell) and Heme/onc (Dr. Gray) were consulted. FOBT was negative and endoscopy found hiatal hernia and gastritis. Patient was started on protonix.  Patient also developed abdominal pain over the course of the hospital stay. CT abdomen showed findings of 14 mm calculus in hepatic duct and signs of fecal impaction. Abdominal pain resolved after smog enema and patient was started on bowel regiment.. ERCP was done with sphincterotomy. She also had several episodes of atrial fibrillation with RVR which was resolved with start of cardizem.    Podiatry surgery was postponed given patients clinical course and poor capability of healing. However, amputation was revisited with development of gangrene at the toe, amputation was done with surgical pathology returning showing osteomyelitis of the toe with clean margins of resection. Patient was restarted on coumadin and started on ancef with tissue cultures returning as MSSA. Patient received MId-line to continue antibiotics (right arm) without complications (to continue ancef). Patient to follow up with podiatry in one week to monitor progress of healing. No hypobaric therapy at this time until further evaluation by wound care clinic.    On day of discharge :  Patient had no overnight events. Patient was seen today morning at the bedside. She state that her pain is managed with the pain medications. She admits to slight shortness of breath and light headed-ness. She denies any chest pain, palpitations, headaches, dizziness, fever, nausea, vomiting, diarrhea or constipation. Patients viatls were obtained. BP 88/50  HR 54 SPO2 95% spo2 on rooms air. Cardiac and lung exam were negative (as below) and patient was later ambulating without SOB. Patient was given a 500cc bolus of NS. Repeat BP was 107/68 and patient denies further SOB. Her amlodipine was discontinued as well.    Vitals  Vital Signs Last 24 Hrs  T(C): 36.6 (15 Nov 2017 05:08), Max: 36.9 (14 Nov 2017 20:30)  T(F): 97.8 (15 Nov 2017 05:08), Max: 98.4 (14 Nov 2017 20:30)  HR: 59 (15 Nov 2017 12:08) (59 - 75)  BP: 91/50 (15 Nov 2017 12:08) (91/50 - 122/77)  BP(mean): --  RR: 17 (15 Nov 2017 05:08) (16 - 17)  SpO2: 97% (15 Nov 2017 05:08) (93% - 97%)    REVIEW OF SYSTEMS:  CONSTITUTIONAL: denies fever, chills, fatigue, weakness  HEENT: denies blurred vision, sore throat  SKIN: denies new lesions, rash  CARDIOVASCULAR: denies chest pain, chest pressure, palpitations  RESPIRATORY: denies shortness of breath, sputum production  GASTROINTESTINAL: denies nausea, vomiting, diarrhea, abdominal pain, dyschezia  GENITOURINARY: denies dysuria, discharge  NEUROLOGICAL: denies numbness, headache, focal weakness  MUSCULOSKELETAL: admits right foot pain from surgery that improves with pain medication denies new joint pain, muscle aches  PSYCH: denies anxiety depression at this time    PHYSICAL EXAM:  GENERAL: NAD,   HEAD:  Atraumatic, Normocephalic  EYES: EOMI, fixed dilated pupil on the left  NERVOUS SYSTEM:  Alert & Oriented X3, poor concentration; Motor Strength 5/5 B/L upper and lower extremities;   CHEST/LUNG: Clear to percussion bilaterally; No rales, rhonchi, wheezing, or rubs  HEART: regular rate and rhythm; No murmurs, rubs, or gallops  ABDOMEN: Soft, Nontender, Nondistended; Bowel sounds present  EXTREMITIES:  trace Peripheral Pulses in bilateral lower extremities, b/l feet are warm, tender to palpation on the right foot at the site of her amputation  SKIN: right foot, currently wrapped with pearl. no drainage noted at site. No erythema, drainage or necrosis noted at incision site    Patient is stable for discharge to [facility] with appropriate follow up.    PMD to be informed  Time spent: 65 minutes

## 2017-11-14 NOTE — PROGRESS NOTE ADULT - PROBLEM SELECTOR PLAN 8
continue statin  DASH diet
DVT ppx w/ ICD's (therapeutic INR at this time) hold AC for evaluation of anemia  GI ppx w/ PPI bid as per GI recs
continue statin  DASH diet

## 2017-11-14 NOTE — PROGRESS NOTE ADULT - ASSESSMENT
82F PMHx Afib on Coumadin, HLD, CVA, MVP, OA, HTN, PVD (s/p RLE bypassx3, most recently 2012), Neuropathy, GERD presents with pain and swelling to right second toe a/w RLE cellulitis, r/o osteomyelitis. Found to have symptomatic anemia and foot infection.    -remains in SR  -continue Cardizem 60mg q6 and metoprolol 100 BID.   - Can change Cardizem to  mg po daily at time of discharge  -No evidence of acute ischemia  -no meaningful volume overload  -Extensive history of PVD and stenotic valvular disease. Now s/p toe amputation. Seems to have tolerated procedure well. monitor for proper wound healing. No a good candidate for 4th lower ext bypass.   -pain control  -continue coumadin with goal INR 2-3. No bridging is necessary  -Monitor electrolytes, keep K>4, Mg>2  - d/c planning per med  -Will continue to follow

## 2017-11-14 NOTE — PROGRESS NOTE ADULT - PROBLEM SELECTOR PROBLEM 7
Peripheral arterial disease
Hypertension
Peripheral arterial disease
Prophylactic measure
Peripheral arterial disease

## 2017-11-14 NOTE — PROGRESS NOTE ADULT - PROBLEM SELECTOR PLAN 3
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 continue to monitor H/h  continue to transfuse if hb< 7  start protonix 40 mg oral qd   ac restarted  s/p endoscopy showing gastritis, hiatal hernia, with biopsy of the antrum and the body. Tortuous esophagus. Biopsy negative for h. pylori

## 2017-11-14 NOTE — PROGRESS NOTE ADULT - PROBLEM SELECTOR PROBLEM 6
Cellulitis of leg, right
Diarrhea, unspecified type
Cellulitis of leg, right
Dyslipidemia
Cellulitis of leg, right
Dyslipidemia
Cellulitis of leg, right

## 2017-11-14 NOTE — DISCHARGE NOTE ADULT - MEDICATION SUMMARY - MEDICATIONS TO TAKE
I will START or STAY ON the medications listed below when I get home from the hospital:    Rolling Walker: Diagnosis: Weakness  -- Indication: For Deconditioning/Amputation    aspirin 81 mg oral delayed release tablet  -- 1 tab(s) by mouth once a day  -- Indication: For CaD    acetaminophen 325 mg oral tablet  -- 2 tab(s) by mouth every 6 hours, As needed, Mild Pain (1 - 3)  -- Indication: For Pain 1-3    oxyCODONE 5 mg oral tablet  -- 1 tab(s) by mouth every 4 hours, As needed, Moderate Pain (4 - 6)  -- Indication: For Pain 4-6    oxyCODONE 10 mg oral tablet  -- 1 tab(s) by mouth every 4 hours, As needed, Severe Pain (7 - 10)  -- Indication: For Pain 7-10    losartan 50 mg oral tablet  -- 1 tab(s) by mouth once a day (at bedtime)  -- Indication: For Hypertension    Cardizem  mg/24 hours oral tablet, extended release  -- 1 tab(s) by mouth once a day  -- Indication: For Atrial Fibrillation    warfarin 3 mg oral tablet  -- 1 tab(s) by mouth once a day  -- Do not take this drug if you are pregnant.  It is very important that you take or use this exactly as directed.  Do not skip doses or discontinue unless directed by your doctor.  Obtain medical advice before taking any non-prescription drugs as some may affect the action of this medication.    -- Indication: For Atrial Fibrillation    gabapentin 100 mg oral capsule  --  by mouth 3 times a day  -- Indication: For Neurogenic Pain    ondansetron 2 mg/mL injectable solution  --  injectable   -- Indication: For Nausea    atorvastatin 40 mg oral tablet  -- 1 tab(s) by mouth once a day (at bedtime)  -- Indication: For Hyperlipidemia    metoprolol tartrate 100 mg oral tablet  -- 1 tab(s) by mouth 2 times a day  -- Indication: For Hypertension    amLODIPine 5 mg oral tablet  -- 1 tab(s) by mouth once a day (at bedtime)  -- Indication: For Hypertension    ceFAZolin  -- 1 milligram(s) intravenous 2 times a day infused over 30 minutes  -- Indication: For Osteomyelitis    nystatin 100,000 units/g topical powder  -- 1 application on skin 2 times a day  -- Indication: For Rash    docusate sodium 100 mg oral capsule  -- 1 cap(s) by mouth 2 times a day, As needed, Constipation  -- Indication: For Constipation    polyethylene glycol 3350 oral powder for reconstitution  -- 17 gram(s) by mouth once a day, As needed, Constipation  -- Indication: For Constipaion    senna oral tablet  -- 2 tab(s) by mouth once a day (at bedtime)  -- Indication: For Constipation    pantoprazole 40 mg oral delayed release tablet  -- 1 tab(s) by mouth once a day (before a meal)  -- Indication: For Acute blood loss anemia    multivitamin  --   once a day  -- Indication: For Supplement I will START or STAY ON the medications listed below when I get home from the hospital:    Rolling Walker: Diagnosis: Weakness  -- Indication: For Deconditioning/Amputation    aspirin 81 mg oral delayed release tablet  -- 1 tab(s) by mouth once a day  -- Indication: For CaD    acetaminophen 325 mg oral tablet  -- 2 tab(s) by mouth every 6 hours, As needed, Mild Pain (1 - 3)  -- Indication: For Pain 1-3    oxyCODONE 5 mg oral tablet  -- 1 tab(s) by mouth every 4 hours, As needed, Moderate Pain (4 - 6)  -- Indication: For Pain 4-6    oxyCODONE 10 mg oral tablet  -- 1 tab(s) by mouth every 4 hours, As needed, Severe Pain (7 - 10)  -- Indication: For Pain 7-10    losartan 50 mg oral tablet  -- 1 tab(s) by mouth once a day (at bedtime)  -- Indication: For Hypertension    Cardizem  mg/24 hours oral tablet, extended release  -- 1 tab(s) by mouth once a day  -- Indication: For Atrial Fibrillation    warfarin 3 mg oral tablet  -- 1 tab(s) by mouth once a day  -- Do not take this drug if you are pregnant.  It is very important that you take or use this exactly as directed.  Do not skip doses or discontinue unless directed by your doctor.  Obtain medical advice before taking any non-prescription drugs as some may affect the action of this medication.    -- Indication: For Atrial Fibrillation    gabapentin 100 mg oral capsule  --  by mouth 3 times a day  -- Indication: For Neurogenic Pain    ondansetron 2 mg/mL injectable solution  --  injectable   -- Indication: For Nausea    atorvastatin 40 mg oral tablet  -- 1 tab(s) by mouth once a day (at bedtime)  -- Indication: For Hyperlipidemia    metoprolol tartrate 100 mg oral tablet  -- 1 tab(s) by mouth 2 times a day  -- Indication: For Hypertension    ceFAZolin  -- 1 milligram(s) intravenous 2 times a day infused over 30 minutes  -- Indication: For Osteomyelitis    nystatin 100,000 units/g topical powder  -- 1 application on skin 2 times a day  -- Indication: For Rash    docusate sodium 100 mg oral capsule  -- 1 cap(s) by mouth 2 times a day, As needed, Constipation  -- Indication: For Constipation    polyethylene glycol 3350 oral powder for reconstitution  -- 17 gram(s) by mouth once a day, As needed, Constipation  -- Indication: For Constipaion    senna oral tablet  -- 2 tab(s) by mouth once a day (at bedtime)  -- Indication: For Constipation    pantoprazole 40 mg oral delayed release tablet  -- 1 tab(s) by mouth once a day (before a meal)  -- Indication: For Acute blood loss anemia    multivitamin  --   once a day  -- Indication: For Supplement

## 2017-11-14 NOTE — PROGRESS NOTE ADULT - PROBLEM SELECTOR PLAN 10
DVT ppx w/ ICD's, coumadin dosing until therapeutic  GI ppx w/ PPI  fall precautions  Physical therapy : reccs d/c to [facility]

## 2017-11-14 NOTE — PROGRESS NOTE ADULT - SUBJECTIVE AND OBJECTIVE BOX
Patient is a 82y old  Female who presents with a chief complaint of weakness and pain both legs (28 Oct 2017 20:23)    INTERVAL HPI/OVERNIGHT EVENTS:  Patient Had no overnight events. Patient was seen today morning at the bedside. Patient has no complaints today. Denies fever, sob, chest pain, nausea, vomiting, diarrhea and constipation. Is tolerating diet without issue. States that her pain is improving and is being controlled by the pain medications.    Vitals  T(C): 36.6 (11-14-17 @ 14:29), Max: 37.1 (11-14-17 @ 12:07)  HR: 67 (11-14-17 @ 14:29) (66 - 95)  BP: 92/61 (11-14-17 @ 14:29) (92/52 - 123/67)  RR: 17 (11-14-17 @ 14:29) (16 - 18)  SpO2: 95% (11-14-17 @ 14:29) (92% - 96%)  Wt(kg): --  I&O's Summary    13 Nov 2017 07:01  -  14 Nov 2017 07:00  --------------------------------------------------------  IN: 360 mL / OUT: 0 mL / NET: 360 mL    14 Nov 2017 07:01  -  14 Nov 2017 16:11  --------------------------------------------------------  IN: 360 mL / OUT: 0 mL / NET: 360 mL        REVIEW OF SYSTEMS:  CONSTITUTIONAL: denies fever, chills, fatigue, weakness  HEENT: denies blurred vision, sore throat  SKIN: denies new lesions, rash  CARDIOVASCULAR: denies chest pain, chest pressure, palpitations  RESPIRATORY: denies shortness of breath, sputum production  GASTROINTESTINAL: denies nausea, vomiting, diarrhea, abdominal pain, dyschezia  GENITOURINARY: denies dysuria, discharge  NEUROLOGICAL: denies numbness, headache, focal weakness  MUSCULOSKELETAL: admits right foot pain from surgery  denies new joint pain, muscle aches  PSYCH: denies anxiety depression at this time    PHYSICAL EXAM:  GENERAL: NAD,   HEAD:  Atraumatic, Normocephalic  EYES: EOMI, fixed dilated pupil on the left  NERVOUS SYSTEM:  Alert & Oriented X3, poor concentration; Motor Strength 5/5 B/L upper and lower extremities;   CHEST/LUNG: Clear to percussion bilaterally; No rales, rhonchi, wheezing, or rubs  HEART: regular rate and rhythm; No murmurs, rubs, or gallops  ABDOMEN: Soft, Nontender, Nondistended; Bowel sounds present  EXTREMITIES:  trace Peripheral Pulses in bilateral lower extremities, b/l feet are warm, tender to palpation on the right foot at the site of her amputation  SKIN: right foot, currently wrapped with pearl. no drainage noted at site. No erythema, drainage or necrosis noted at incesion site    MEDICATIONS  (STANDING):  atorvastatin 40 milliGRAM(s) Oral at bedtime  ceFAZolin   IVPB 1000 milliGRAM(s) IV Intermittent every 12 hours  diltiazem    Tablet 60 milliGRAM(s) Oral every 6 hours  gabapentin 300 milliGRAM(s) Oral three times a day  losartan 100 milliGRAM(s) Oral daily  metoprolol     tartrate 100 milliGRAM(s) Oral two times a day  multivitamin 1 Tablet(s) Oral daily  nystatin Powder 1 Application(s) Topical two times a day  pantoprazole    Tablet 40 milliGRAM(s) Oral before breakfast  senna 2 Tablet(s) Oral at bedtime    MEDICATIONS  (PRN):  acetaminophen   Tablet. 650 milliGRAM(s) Oral every 6 hours PRN Mild Pain (1 - 3)  ondansetron Injectable 4 milliGRAM(s) IV Push once PRN Nausea and/or Vomiting  oxyCODONE    IR 10 milliGRAM(s) Oral every 4 hours PRN Severe Pain (7 - 10)  oxyCODONE    IR 5 milliGRAM(s) Oral every 4 hours PRN Moderate Pain (4 - 6)      LABS:                        9.1    8.3   )-----------( 251      ( 14 Nov 2017 06:44 )             29.6     11-14    141  |  103  |  26<H>  ----------------------------<  97  3.6   |  27  |  1.40<H>    Ca    8.7      14 Nov 2017 06:44    TPro  6.2  /  Alb  2.3<L>  /  TBili  0.4  /  DBili  x   /  AST  11<L>  /  ALT  12  /  AlkPhos  64  11-13    PT/INR - ( 14 Nov 2017 06:44 )   PT: 12.5 sec;   INR: 1.14 ratio             CAPILLARY BLOOD GLUCOSE                  RADIOLOGY & ADDITIONAL TESTS:    Imaging Personally Reviewed:       Advance Directives:      Palliative Care:

## 2017-11-14 NOTE — PROGRESS NOTE ADULT - NSHPATTENDINGPLANDISCUSS_GEN_ALL_CORE
Dr. Parra
Dr Temple
Dr. Parra
patient , Dr. Coyne, left message on Dr Parra's machine
Dr. De Los Santos cardio and Dr. Coyne ID,
RN, patient, vascular - I placed consults w/ GI, cardio as well
patient, family, resident team, vascular surgery, podiatry, GI attendings
Doretha Adame
patient, RN, GI, resident team, hem/onc, vascular
patient, resident team, nursing staff, social work
patient, RN, GI, resident team, hem/onc, vascular
Dr. Bright podiatry resident
resident, RN, family, pt, social work, GI, vascular surgery

## 2017-11-14 NOTE — PROGRESS NOTE ADULT - SUBJECTIVE AND OBJECTIVE BOX
83 yo Female with PMH of HLD, CVA, MVP, OA, HTN, PVD, Neuropathy, GERD with chief complaint of Right 2nd digit cellulitis. 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 Pt admits pain is getting better to right foot. Pt denies N/V/F/C at this time.     H&P from ED: "82F PMHx Afib on Coumadin, HLD, CVA, MVP, OA, HTN, PVD (s/p RLE bypassx3, most recently 2012), Neuropathy, GERD presents with pain and swelling to right second toe that started 1 month ago. Patient states foot became red, painful, and a small wound with a scab started forming on the top portion of her second toe. It became extremely difficult to bear weight on the foot so she went to see Dr. Isaacs at the wound center and he drained the wound at which point, yellow pus drained from the wound. He prescribed her an antibiotic (she does not recall which one). She denies any fevers, chills, chesst pain or shortness of breat. Admits to joint pain due to her RA."    Vital Signs Last 24 Hrs  T(C): 37.1 (14 Nov 2017 12:07), Max: 37.1 (14 Nov 2017 12:07)  T(F): 98.8 (14 Nov 2017 12:07), Max: 98.8 (14 Nov 2017 12:07)  HR: 66 (14 Nov 2017 12:07) (66 - 95)  BP: 112/71 (14 Nov 2017 12:07) (92/52 - 123/67)  BP(mean): --  RR: 16 (14 Nov 2017 12:07) (16 - 18)  SpO2: 96% (14 Nov 2017 12:07) (92% - 96%)    Lower Extremity Examination  Vasc: DP, PT non palpable, CFT delayed to all digits, absent pedal hair bilat, TG: WNL  Neuro: gross protective sensation intact bilat  Derm: surgical site suture intact without wound dehiscences, no malodor, skin is thin, atrophic and shiny, no ischemic changes noted, slight erythemic changes on dorsum of 3rd MPJ right foot  MSK: pain on palpation to right forefoot                          9.1    8.3   )-----------( 251      ( 14 Nov 2017 06:44 )             29.6   11-14    141  |  103  |  26<H>  ----------------------------<  97  3.6   |  27  |  1.40<H>    Ca    8.7      14 Nov 2017 06:44    TPro  6.2  /  Alb  2.3<L>  /  TBili  0.4  /  DBili  x   /  AST  11<L>  /  ALT  12  /  AlkPhos  64  11-13

## 2017-11-14 NOTE — DISCHARGE NOTE ADULT - CARE PROVIDERS DIRECT ADDRESSES
,DirectAddress_Unknown,[email or web address],DirectAddress_Unknown,DirectAddress_Unknown

## 2017-11-14 NOTE — PROGRESS NOTE ADULT - PROBLEM SELECTOR PLAN 4
controlled  continue cardizem and metoprolol with parameters  maintain HR < 110  continue AC with coumadin, INR sub therapeutic. continue coumadin. f/u am pt/inr  f/u cardiology (Dr. De Los Santos), no bridging as per cardio needed,

## 2017-11-14 NOTE — PROGRESS NOTE ADULT - SUBJECTIVE AND OBJECTIVE BOX
[facility] -- Dahiana Bañuelos, Toney Horner, Truman Falcon Savella  Office # [phone]      Follow Up:  AF, PAD    Subjective/Observations: Patient seen and examined. Events noted. Resting comfortably in bed. No complaints of chest pain, dyspnea, or palpitations reported. 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  Osteoarthritis  Afib  PVD (peripheral vascular disease)  Neuropathy: (Right lower leg)  H/O cerebral aneurysm repair: brain clips  CVA (cerebral vascular accident): (&quot;Mini-stroke&quot;,1990&#x27;s)  Rheumatoid arthritis  Hyperlipidemia  Hypertension  S/P cataract surgery: (Left eye)  Elective surgery: (&quot;Twisted bowel&quot;, 2014)  Elective surgery: (Exision of cyst on liver, 1985)  S/P ORIF (open reduction internal fixation) fracture: Left hip fx (2012) &amp; R hip fx (2013)  H/O cerebral aneurysm repair: Brain clips (1978(  Renal stone: Cysto stent placement 10/1/2014  PVD (peripheral vascular disease): s/p RLE bypass x 3, most recent 3/2012 Right external iliac to PT bypass w/ PTFE (2012)  S/P FRED (total abdominal hysterectomy): (1987, Hx of &quot;ovarian cancer?&quot;)      MEDICATIONS  (STANDING):  atorvastatin 40 milliGRAM(s) Oral at bedtime  ceFAZolin   IVPB 1000 milliGRAM(s) IV Intermittent every 12 hours  diltiazem    Tablet 60 milliGRAM(s) Oral every 6 hours  gabapentin 300 milliGRAM(s) Oral three times a day  losartan 100 milliGRAM(s) Oral daily  metoprolol     tartrate 100 milliGRAM(s) Oral two times a day  multivitamin 1 Tablet(s) Oral daily  nystatin Powder 1 Application(s) Topical two times a day  pantoprazole    Tablet 40 milliGRAM(s) Oral before breakfast  senna 2 Tablet(s) Oral at bedtime    MEDICATIONS  (PRN):  acetaminophen   Tablet. 650 milliGRAM(s) Oral every 6 hours PRN Mild Pain (1 - 3)  ondansetron Injectable 4 milliGRAM(s) IV Push once PRN Nausea and/or Vomiting  oxyCODONE    IR 10 milliGRAM(s) Oral every 4 hours PRN Severe Pain (7 - 10)  oxyCODONE    IR 5 milliGRAM(s) Oral every 4 hours PRN Moderate Pain (4 - 6)      Allergies    No Known Allergies    Intolerances            Vital Signs Last 24 Hrs  T(C): 37.1 (14 Nov 2017 12:07), Max: 37.1 (14 Nov 2017 12:07)  T(F): 98.8 (14 Nov 2017 12:07), Max: 98.8 (14 Nov 2017 12:07)  HR: 66 (14 Nov 2017 12:07) (66 - 95)  BP: 112/71 (14 Nov 2017 12:07) (92/52 - 123/67)  BP(mean): --  RR: 16 (14 Nov 2017 12:07) (16 - 18)  SpO2: 96% (14 Nov 2017 12:07) (92% - 96%)    I&O's Summary    13 Nov 2017 07:01  -  14 Nov 2017 07:00  --------------------------------------------------------  IN: 360 mL / OUT: 0 mL / NET: 360 mL          PHYSICAL EXAM:  TELE: off  Constitutional: NAD, awake and alert, well-developed  HEENT: Moist Mucous Membranes, Anicteric  Pulmonary: Decreased breath sounds b/l.   Cardiovascular: Regular, S1 and S2, 2/6 SM  Gastrointestinal: Bowel Sounds present, soft, nontender.   Lymph: No peripheral edema. No lymphadenopathy.  Skin: No visible rashes or ulcers.  Psych:  Mood & affect appropriate    LABS: All Labs Reviewed:                        9.1    8.3   )-----------( 251      ( 14 Nov 2017 06:44 )             29.6                         8.9    8.7   )-----------( 264      ( 13 Nov 2017 06:39 )             29.4                         8.6    9.8   )-----------( 258      ( 12 Nov 2017 07:39 )             27.6     14 Nov 2017 06:44    141    |  103    |  26     ----------------------------<  97     3.6     |  27     |  1.40   13 Nov 2017 06:39    141    |  104    |  22     ----------------------------<  99     3.5     |  28     |  1.20   12 Nov 2017 07:39    139    |  104    |  23     ----------------------------<  117    3.5     |  29     |  1.20     Ca    8.7        14 Nov 2017 06:44  Ca    8.2        13 Nov 2017 06:39  Ca    8.2        12 Nov 2017 07:39    TPro  6.2    /  Alb  2.3    /  TBili  0.4    /  DBili  x      /  AST  11     /  ALT  12     /  AlkPhos  64     13 Nov 2017 06:39  TPro  5.9    /  Alb  2.3    /  TBili  0.5    /  DBili  x      /  AST  10     /  ALT  14     /  AlkPhos  62     12 Nov 2017 07:39    PT/INR - ( 14 Nov 2017 06:44 )   PT: 12.5 sec;   INR: 1.14 ratio

## 2017-11-14 NOTE — PROGRESS NOTE ADULT - PROBLEM SELECTOR PROBLEM 10
Prophylactic measure

## 2017-11-14 NOTE — DISCHARGE NOTE ADULT - CARE PLAN
Principal Discharge DX:	Osteomyelitis, unspecified site, unspecified type  Goal:	with amputation  Instructions for follow-up, activity and diet:	continue antibiotics thru  monitor for signs of bleeding or infection at site of amputation  follow up in wound care clinic (Dr. Cesar) 1 week after discharge  Every other day dressing change to right foot as follows. Remove previous dressing from right foot. Cleanse surgical site with Normal Saline. Pad to dry surgical site with sterile 4x4 gauze. Apply Silvercel/Aquacel then dressed with DSD consisting 4x4 gauze and pearl. Keep dressing clean dry and intact until next dressing change  Secondary Diagnosis:	Peripheral arterial disease  Instructions for follow-up, activity and diet:	You are at high risk with further bypass procedure on the right leg with your previous history of grafts and cardiac history.  follow up with vascular surgeon (Dr. Parham)  Secondary Diagnosis:	Chronic a-fib  Instructions for follow-up, activity and diet:	start cardizem 240 mg by mouth daily.  continue coumadin 3mg as well as follow up for INR checks per routine.  follow up with PCP (Dr. Ramos)  follow up with Cardiology (Dr. Laughlin)  Monitor for signs including GI bleed trauma, darkened stools, bebeto blood in stool, fracture, weakness, shortness of breath or chest pain.  Secondary Diagnosis:	Biliary stone  Goal:	completed sphincterotomy  Instructions for follow-up, activity and diet:	No need for cholecystectomy (removal of gall bladder) at this time as yo are not symptomatic and there is a high risk of conversion to open surgery given you previous history of surgeries  Secondary Diagnosis:	Acute blood loss anemia  Instructions for follow-up, activity and diet:	Start Protonix (Pantoprazole ) 40 mg one time by moth every day before meals  Monitor for signs of GI bleed (bebeto blood per rectum, darkened stools)  follow up with GI (Dr. Mcconnell) Principal Discharge DX:	Osteomyelitis, unspecified site, unspecified type  Goal:	with amputation  Instructions for follow-up, activity and diet:	continue IV ancef for 2-4 weeks (to be stooped by infectious disease Dr. Mcconnell in [facility])  continue pain medications prn with bowel regimen.  monitor for signs of bleeding or infection at site of amputation  follow up in wound care clinic (Dr. Temple) 1 week after discharge  Every other day dressing change to right foot as follows. Remove previous dressing from right foot. Cleanse surgical site with Normal Saline. Pad to dry surgical site with sterile 4x4 gauze. Apply Silvercel/Aquacel then dressed with DSD consisting 4x4 gauze and pearl. Keep dressing clean dry and intact until next dressing change  Secondary Diagnosis:	Peripheral arterial disease  Instructions for follow-up, activity and diet:	You are at high risk with further bypass procedure on the right leg with your previous history of grafts and cardiac history.  follow up with vascular surgeon (Dr. Parham)  Secondary Diagnosis:	Chronic a-fib  Instructions for follow-up, activity and diet:	start cardizem 240 mg by mouth daily.  continue coumadin 3mg as well as follow up for INR checks per routine.  follow up with PCP (Dr. Ramos)  follow up with Cardiology (Dr. Laughlin)  Monitor for signs including GI bleed trauma, darkened stools, bebeto blood in stool, fracture, weakness, shortness of breath or chest pain.  Secondary Diagnosis:	Biliary stone  Goal:	completed sphincterotomy  Instructions for follow-up, activity and diet:	No need for cholecystectomy (removal of gall bladder) at this time as yo are not symptomatic and there is a high risk of conversion to open surgery given you previous history of surgeries  Secondary Diagnosis:	Acute blood loss anemia  Instructions for follow-up, activity and diet:	Start Protonix (Pantoprazole ) 40 mg one time by moth every day before meals  Monitor for signs of GI bleed (bebeto blood per rectum, darkened stools)  follow up with GI (Dr. Mcconnell) Principal Discharge DX:	Osteomyelitis, unspecified site, unspecified type  Goal:	with amputation  Instructions for follow-up, activity and diet:	continue IV ancef for 2-4 weeks (to be stooped by infectious disease Dr. Mcconnell in [facility]) through mid-line  continue pain medications prn with bowel regimen.  monitor for signs of bleeding or infection at site of amputation  follow up in wound care clinic (Dr. Temple) 1 week after discharge  Every other day dressing change to right foot as follows. Remove previous dressing from right foot. Cleanse surgical site with Normal Saline. Pad to dry surgical site with sterile 4x4 gauze. Apply Silvercel/Aquacel then dressed with DSD consisting 4x4 gauze and pearl. Keep dressing clean dry and intact until next dressing change  Secondary Diagnosis:	Peripheral arterial disease  Instructions for follow-up, activity and diet:	You are at high risk with further bypass procedure on the right leg with your previous history of grafts and cardiac history.  follow up with vascular surgeon (Dr. Parham)  Secondary Diagnosis:	Chronic a-fib  Instructions for follow-up, activity and diet:	start cardizem 240 mg by mouth daily.  continue coumadin to maintain INR between 2-3. as well as follow up for INR checks per routine.  follow up with PCP (Dr. Ramos)  follow up with Cardiology (Dr. Laughlin)  Monitor for signs including GI bleed trauma, darkened stools, bebeto blood in stool, fracture, weakness, shortness of breath or chest pain.  Secondary Diagnosis:	Biliary stone  Goal:	completed sphincterotomy  Instructions for follow-up, activity and diet:	No need for cholecystectomy (removal of gall bladder) at this time as yo are not symptomatic and there is a high risk of conversion to open surgery given you previous history of surgeries  Secondary Diagnosis:	Acute blood loss anemia  Instructions for follow-up, activity and diet:	Start Protonix (Pantoprazole ) 40 mg one time by moth every day before meals  Monitor for signs of GI bleed (bebeto blood per rectum, darkened stools)  follow up with GI (Dr. Mcconnell) Principal Discharge DX:	Osteomyelitis, unspecified site, unspecified type  Goal:	with amputation  Instructions for follow-up, activity and diet:	continue IV ancef for 2-4 weeks (to be stooped by infectious disease Dr. Mcconnell in [facility]) through mid-line  continue pain medications prn with bowel regimen.  monitor for signs of bleeding or infection at site of amputation  follow up in wound care clinic (Dr. Temple) 1 week after discharge  Every other day dressing change to right foot as follows. Remove previous dressing from right foot. Cleanse surgical site with Normal Saline. Pad to dry surgical site with sterile 4x4 gauze. Apply Silvercel/Aquacel then dressed with DSD consisting 4x4 gauze and pearl. Keep dressing clean dry and intact until next dressing change  Secondary Diagnosis:	Peripheral arterial disease  Instructions for follow-up, activity and diet:	You are at high risk with further bypass procedure on the right leg with your previous history of grafts and cardiac history.  follow up with vascular surgeon (Dr. Parham)  Secondary Diagnosis:	Chronic a-fib  Instructions for follow-up, activity and diet:	start cardizem 240 mg by mouth daily.  continue coumadin to maintain INR between 2-3. as well as follow up for INR checks per routine.  follow up with PCP (Dr. Ramos)  follow up with Cardiology (Dr. Laughlin)  Monitor for signs including GI bleed trauma, darkened stools, bebeto blood in stool, fracture, weakness, shortness of breath or chest pain.  Secondary Diagnosis:	Biliary stone  Goal:	completed sphincterotomy  Instructions for follow-up, activity and diet:	No need for cholecystectomy (removal of gall bladder) at this time as yo are not symptomatic and there is a high risk of conversion to open surgery given you previous history of surgeries  Secondary Diagnosis:	Acute blood loss anemia  Instructions for follow-up, activity and diet:	Start Protonix (Pantoprazole ) 40 mg one time by moth every day before meals  Monitor for signs of GI bleed (bebeto blood per rectum, darkened stools)  follow up with GI (Dr. Mcconnell)  Secondary Diagnosis:	Hypertension  Instructions for follow-up, activity and diet:	stop amlodipine for now (for hypotension)  continue all other home medications  follow up with PCP

## 2017-11-14 NOTE — PROGRESS NOTE ADULT - ASSESSMENT
83 yo female with Right 2nd digit cellulitis and osteomyelitis, s/p R 2nd toe amp and 2nd met head resection on 11/9/17      Plan:   Pt seen and eval with Dr Temple  Surgical site looks fine at this time without any ischemic changes  However, there is no blood or drainage soaked on dressing  Previous dressing removed   Aquacel and DSD applied to right foot    Pt can d/c to [facility] and f/u to [facility] with Dr Temple one week after d/c    Rec PT cons for ambulation  Pt can ambulate as tolerated with surgical shoe and a rollator  Cont med management    Pod cont f/u while pt in house    Wound Care Instruction  Every other day dressing change to right foot  1. Remove previous dressing from right foot  2. Cleansed surgical site with NS  3. Pad to dry surgical site with sterile 4x4 gauze  4. Apply Silvercel/Aquacel then dressed with DSD consisting 4x4 gauze and pearl  5. Keep dressing clean dry and intact until next dressing change  Pt is to follow up to [facility] with Dr Temple, one week after discharge

## 2017-11-14 NOTE — PROGRESS NOTE ADULT - SUBJECTIVE AND OBJECTIVE BOX
Interval Events: Pt denies abdominal pain, nausea, vomiting, diarrhea, constipation, BRBPR, melena. She c/o R foot pain    HPI:82F PMHx Afib on Coumadin, HLD, CVA, MVP, OA, HTN, PVD (s/p RLE bypassx3, most recently 2012), Neuropathy, GERD presents with pain and swelling to right second toe that started 1 month ago. Patient states foot became red, painful, and a small wound with a scab started forming on the top portion of her second toe. It became extremely difficult to bear weight on the foot so she went to see Dr. Isaacs at the wound center and he drained the wound at which point, yellow pus drained from the wound. He prescribed her an antibiotic (she does not recall which one). She denies any fevers, chills, chest pain or shortness of breat. Admits to joint pain due to her RA.     In ED, pt's vital was wnl. WBC 8.2, Hct: 26.1, lactate 0.8, , CMP revealed elevated bilirubin of 1.5, remainder is wnl , INR: 2.26. CXR showed no acute pathology. X ray of Rt foot showed possible small periarticular erosion medial aspect head of the proximal phalanx which might represent infection/ osteomyelitis. Pt received 1 dose  of  vanco and 1 dose of zosyn and 1 bolus of NS, BCX, UCX was sent in  ED. (28 Oct 2017 13:42)    MEDICATIONS  (STANDING):  atorvastatin 40 milliGRAM(s) Oral at bedtime  ceFAZolin   IVPB 1000 milliGRAM(s) IV Intermittent every 8 hours  diltiazem    Tablet 60 milliGRAM(s) Oral every 6 hours  gabapentin 300 milliGRAM(s) Oral three times a day  losartan 100 milliGRAM(s) Oral daily  metoprolol     tartrate 100 milliGRAM(s) Oral two times a day  multivitamin 1 Tablet(s) Oral daily  nystatin Powder 1 Application(s) Topical two times a day  pantoprazole  Injectable 40 milliGRAM(s) IV Push every 12 hours  senna 2 Tablet(s) Oral at bedtime    MEDICATIONS  (PRN):  acetaminophen   Tablet. 650 milliGRAM(s) Oral every 6 hours PRN Mild Pain (1 - 3)  ondansetron Injectable 4 milliGRAM(s) IV Push once PRN Nausea and/or Vomiting  oxyCODONE    IR 10 milliGRAM(s) Oral every 4 hours PRN Severe Pain (7 - 10)  oxyCODONE    IR 5 milliGRAM(s) Oral every 4 hours PRN Moderate Pain (4 - 6)      Allergies    No Known Allergies    Intolerances        Review of Systems:    General:  No wt loss, fevers, chills, night sweats,fatigue,   Eyes:  Good vision, no reported pain  ENT:  No sore throat, pain, runny nose, dysphagia  CV:  No pain, palpitations, hypo/hypertension  Resp:  No dyspnea, cough, tachypnea, wheezing  GI:  No pain, No nausea, No vomiting, No diarrhea, No constipation, No weight loss, No fever, No pruritis, No rectal bleeding, No melena, No dysphagia  :  No pain, bleeding, incontinence, nocturia  Muscle:  +R foot pain  Neuro:  No weakness, tingling, memory problems  Psych:  No fatigue, insomnia, mood problems, depression  Endocrine:  No polyuria, polydypsia, cold/heat intolerance  Heme:  No petechiae, ecchymosis, easy bruisability  Skin:  No rash, tattoos, scars, edema      Vital Signs Last 24 Hrs  T(C): 36.8 (14 Nov 2017 05:27), Max: 36.9 (13 Nov 2017 19:47)  T(F): 98.3 (14 Nov 2017 05:27), Max: 98.4 (13 Nov 2017 19:47)  HR: 81 (14 Nov 2017 05:27) (72 - 95)  BP: 117/77 (14 Nov 2017 05:27) (92/52 - 123/67)  BP(mean): --  RR: 17 (14 Nov 2017 05:27) (17 - 18)  SpO2: 95% (14 Nov 2017 05:27) (92% - 95%)    PHYSICAL EXAM:    Constitutional: NAD, well-developed  HEENT: EOMI, throat clear  Neck: No LAD, supple  Respiratory: CTA and P  Cardiovascular: S1 and S2, RRR, no M  Gastrointestinal: BS+, soft, NT/ND, neg HSM,  Extremities: No peripheral edema, s/p R foot amputation   Vascular: 2+ peripheral pulses  Neurological: A/O x 3, no focal deficits  Psychiatric: Normal mood, normal affect  Skin: No rashes      LABS:                        9.1    8.3   )-----------( 251      ( 14 Nov 2017 06:44 )             29.6     11-14    141  |  103  |  26<H>  ----------------------------<  97  3.6   |  27  |  1.40<H>    Ca    8.7      14 Nov 2017 06:44    TPro  6.2  /  Alb  2.3<L>  /  TBili  0.4  /  DBili  x   /  AST  11<L>  /  ALT  12  /  AlkPhos  64  11-13    PT/INR - ( 14 Nov 2017 06:44 )   PT: 12.5 sec;   INR: 1.14 ratio               RADIOLOGY & ADDITIONAL TESTS:

## 2017-11-14 NOTE — PROGRESS NOTE ADULT - SUBJECTIVE AND OBJECTIVE BOX
CARITO CONSTANTINO is a 82yFemale , patient examined and chart reviewed. Patient seen and examined today being followed for gangrene left foot       INTERVAL HPI/ OVERNIGHT EVENTS: Events noted, doing well . 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  Osteoarthritis  Afib  PVD (peripheral vascular disease)  Neuropathy: (Right lower leg)  H/O cerebral aneurysm repair: brain clips  CVA (cerebral vascular accident): (&quot;Mini-stroke&quot;,1990&#x27;s)  Rheumatoid arthritis  Hyperlipidemia  Hypertension  S/P cataract surgery: (Left eye)  Elective surgery: (&quot;Twisted bowel&quot;, 2014)  Elective surgery: (Exision of cyst on liver, 1985)  S/P ORIF (open reduction internal fixation) fracture: Left hip fx (2012) &amp; R hip fx (2013)  H/O cerebral aneurysm repair: Brain clips (1978(  Renal stone: Cysto stent placement 10/1/2014  PVD (peripheral vascular disease): s/p RLE bypass x 3, most recent 3/2012 Right external iliac to PT bypass w/ PTFE (2012)  S/P FRED (total abdominal hysterectomy): (1987, Hx of &quot;ovarian cancer?&quot;)      For details regarding the patient's social history, family history, and other miscellaneous elements, please refer the initial infectious diseases consultation and/or the admitting history and physical examination for this admission.  ROS:  CONSTITUTIONAL:  Negative fever or chills, feels well, good appetite  EYES:  Negative  blurry vision or double vision  CARDIOVASCULAR:  Negative for chest pain or palpitations  RESPIRATORY:  Negative for cough, wheezing, or SOB   GASTROINTESTINAL:  Negative for nausea, vomiting, diarrhea, constipation, or abdominal pain  GENITOURINARY:  Negative frequency, urgency or dysuria  NEUROLOGIC:  No headache, confusion, dizziness, lightheadedness  All other systems were reviewed and are negative         Current inpatient medications :    ANTIBIOTICS/RELEVANT:  ceFAZolin   IVPB 1000 milliGRAM(s) IV Intermittent every 12 hours      acetaminophen   Tablet. 650 milliGRAM(s) Oral every 6 hours PRN  atorvastatin 40 milliGRAM(s) Oral at bedtime  diltiazem    Tablet 60 milliGRAM(s) Oral every 6 hours  gabapentin 300 milliGRAM(s) Oral three times a day  losartan 100 milliGRAM(s) Oral daily  metoprolol     tartrate 100 milliGRAM(s) Oral two times a day  multivitamin 1 Tablet(s) Oral daily  nystatin Powder 1 Application(s) Topical two times a day  ondansetron Injectable 4 milliGRAM(s) IV Push once PRN  oxyCODONE    IR 10 milliGRAM(s) Oral every 4 hours PRN  oxyCODONE    IR 5 milliGRAM(s) Oral every 4 hours PRN  pantoprazole    Tablet 40 milliGRAM(s) Oral before breakfast  senna 2 Tablet(s) Oral at bedtime      Objective:    11-13 @ 07:01  -  11-14 @ 07:00  --------------------------------------------------------  IN: 360 mL / OUT: 0 mL / NET: 360 mL    11-14 @ 07:01  -  11-14 @ 14:54  --------------------------------------------------------  IN: 360 mL / OUT: 0 mL / NET: 360 mL      T(C): 36.6 (11-14-17 @ 14:29), Max: 37.1 (11-14-17 @ 12:07)  HR: 67 (11-14-17 @ 14:29) (66 - 95)  BP: 92/61 (11-14-17 @ 14:29) (92/52 - 123/67)  RR: 17 (11-14-17 @ 14:29) (16 - 18)  SpO2: 95% (11-14-17 @ 14:29) (92% - 96%)  Wt(kg): --      Physical Exam:  General: well developed well nourished, in no acute distress  Eyes: sclera anicteric, pupils equal and reactive to light  ENMT: buccal mucosa moist, pharynx not injected  Neck: supple, trachea midline  Lungs: clear, no wheeze/rhonchi  Cardiovascular: regular rate and rhythm, S1 S2  Abdomen: soft, nontender, no organomegaly present, bowel sounds normal  Neurological: alert and oriented x3, Cranial Nerves II-XII grossly intact  Skin: no increased ecchymosis/petechiae/purpura  Lymph Nodes: no palpable cervical/supraclavicular lymph nodes enlargements  Extremities: no cyanosis/clubbing/edema dressing intact       LABS:                          9.1    8.3   )-----------( 251      ( 14 Nov 2017 06:44 )             29.6       11-14    141  |  103  |  26<H>  ----------------------------<  97  3.6   |  27  |  1.40<H>    Ca    8.7      14 Nov 2017 06:44    TPro  6.2  /  Alb  2.3<L>  /  TBili  0.4  /  DBili  x   /  AST  11<L>  /  ALT  12  /  AlkPhos  64  11-13      PT/INR - ( 14 Nov 2017 06:44 )   PT: 12.5 sec;   INR: 1.14 ratio        82 y.o. female with multiple medical problems Afib on Coumadin, HLD, CVA, MVP, OA, HTN, PVD (s/p RLE bypassx3, most recently 2012), Neuropathy, GERD  presents with  ischemic right foot and leg coupled with gangrene of right 2nd. toe.  She has multiple failed bypasses of right leg. 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 Her pain is out of proportion to the toe infection and likely is related to ischemic pain right leg .    She is scheduled for angiogram of right leg as recommended by vascular surgery , podiatry still feels the gangrenous toe should be amputated and then try HBOT     She has fecal impaction , most likely cause of abdominal pain.     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 She has severe PVD with occluded grafts , she is s/p resection of the right   2nd toe thru the metatarsal head     Plan :   - will change to Ancef 1 gram q 8 hours as it is MSSA, will discuss with Podiatry if patient needs home IV antibiotics for 2-4 weeks  till wound heals as she has severe PVD and may not be appropriate  from po antibiotics   - wound care as per podiatry   - pain control ? use of fentanyl patch   - trend renal function and CBC  - overall prognosis is poor. Discussed with Dr. Lucas   - get mid line   Continue with present regime .  Appropriate use of antibiotics and adverse effects reviewed.    I have discussed the above plan of care with patient and  family in detail. They expressed understanding of the  treatment plan . Risks, benefits and alternatives discussed in detail. I have asked if they have any questions or concerns and appropriately addressed them to the best of my ability .    > 35 minutes were spent in direct patient care reviewing notes, medications ,labs data/ imaging , discussion with multidisciplinary team.    Thank you for allowing me to participate in care of your patient .    Judi Coyne MD  [phone]

## 2017-11-14 NOTE — DISCHARGE NOTE ADULT - PLAN OF CARE
with amputation continue antibiotics thru  monitor for signs of bleeding or infection at site of amputation  follow up in wound care clinic (Dr. Cesar) 1 week after discharge  Every other day dressing change to right foot as follows. Remove previous dressing from right foot. Cleanse surgical site with Normal Saline. Pad to dry surgical site with sterile 4x4 gauze. Apply Silvercel/Aquacel then dressed with DSD consisting 4x4 gauze and pearl. Keep dressing clean dry and intact until next dressing change You are at high risk with further bypass procedure on the right leg with your previous history of grafts and cardiac history.  follow up with vascular surgeon (Dr. Parham) start cardizem 240 mg by mouth daily.  continue coumadin 3mg as well as follow up for INR checks per routine.  follow up with PCP (Dr. Ramos)  follow up with Cardiology (Dr. Laughlin)  Monitor for signs including GI bleed trauma, darkened stools, bebeto blood in stool, fracture, weakness, shortness of breath or chest pain. completed sphincterotomy No need for cholecystectomy (removal of gall bladder) at this time as yo are not symptomatic and there is a high risk of conversion to open surgery given you previous history of surgeries Start Protonix (Pantoprazole ) 40 mg one time by moth every day before meals  Monitor for signs of GI bleed (bebeto blood per rectum, darkened stools)  follow up with GI (Dr. Mcconnell) continue IV ancef for 2-4 weeks (to be stooped by infectious disease Dr. Coyne in [facility])  continue pain medications prn with bowel regimen.  monitor for signs of bleeding or infection at site of amputation  follow up in wound care clinic (Dr. Temple) 1 week after discharge  Every other day dressing change to right foot as follows. Remove previous dressing from right foot. Cleanse surgical site with Normal Saline. Pad to dry surgical site with sterile 4x4 gauze. Apply Silvercel/Aquacel then dressed with DSD consisting 4x4 gauze and pearl. Keep dressing clean dry and intact until next dressing change continue IV ancef for 2-4 weeks (to be stooped by infectious disease Dr. Coyne in [facility]) through mid-line  continue pain medications prn with bowel regimen.  monitor for signs of bleeding or infection at site of amputation  follow up in wound care clinic (Dr. Temple) 1 week after discharge  Every other day dressing change to right foot as follows. Remove previous dressing from right foot. Cleanse surgical site with Normal Saline. Pad to dry surgical site with sterile 4x4 gauze. Apply Silvercel/Aquacel then dressed with DSD consisting 4x4 gauze and pearl. Keep dressing clean dry and intact until next dressing change start cardizem 240 mg by mouth daily.  continue coumadin to maintain INR between 2-3. as well as follow up for INR checks per routine.  follow up with PCP (Dr. Ramos)  follow up with Cardiology (Dr. Laughlin)  Monitor for signs including GI bleed trauma, darkened stools, bebeto blood in stool, fracture, weakness, shortness of breath or chest pain. stop amlodipine for now (for hypotension)  continue all other home medications  follow up with PCP

## 2017-11-14 NOTE — DISCHARGE NOTE ADULT - MEDICATION SUMMARY - MEDICATIONS TO STOP TAKING
I will STOP taking the medications listed below when I get home from the hospital:    omeprazole 40 mg oral delayed release capsule  -- 1 cap(s) by mouth once a day (at bedtime) I will STOP taking the medications listed below when I get home from the hospital:    omeprazole 40 mg oral delayed release capsule  -- 1 cap(s) by mouth once a day (at bedtime)    amLODIPine 5 mg oral tablet  -- 1 tab(s) by mouth once a day (at bedtime)

## 2017-11-15 VITALS — SYSTOLIC BLOOD PRESSURE: 151 MMHG | HEART RATE: 77 BPM | DIASTOLIC BLOOD PRESSURE: 75 MMHG

## 2017-11-15 LAB
ANION GAP SERPL CALC-SCNC: 8 MMOL/L — SIGNIFICANT CHANGE UP (ref 5–17)
APTT BLD: 33.7 SEC — SIGNIFICANT CHANGE UP (ref 27.5–37.4)
BUN SERPL-MCNC: 26 MG/DL — HIGH (ref 7–23)
CALCIUM SERPL-MCNC: 8.8 MG/DL — SIGNIFICANT CHANGE UP (ref 8.5–10.1)
CHLORIDE SERPL-SCNC: 105 MMOL/L — SIGNIFICANT CHANGE UP (ref 96–108)
CO2 SERPL-SCNC: 28 MMOL/L — SIGNIFICANT CHANGE UP (ref 22–31)
CREAT SERPL-MCNC: 1.3 MG/DL — SIGNIFICANT CHANGE UP (ref 0.5–1.3)
GLUCOSE SERPL-MCNC: 91 MG/DL — SIGNIFICANT CHANGE UP (ref 70–99)
HCT VFR BLD CALC: 28.5 % — LOW (ref 34.5–45)
HGB BLD-MCNC: 8.6 G/DL — LOW (ref 11.5–15.5)
INR BLD: 1.22 RATIO — HIGH (ref 0.88–1.16)
MCHC RBC-ENTMCNC: 28 PG — SIGNIFICANT CHANGE UP (ref 27–34)
MCHC RBC-ENTMCNC: 30.2 GM/DL — LOW (ref 32–36)
MCV RBC AUTO: 92.8 FL — SIGNIFICANT CHANGE UP (ref 80–100)
PLATELET # BLD AUTO: 246 K/UL — SIGNIFICANT CHANGE UP (ref 150–400)
POTASSIUM SERPL-MCNC: 3.9 MMOL/L — SIGNIFICANT CHANGE UP (ref 3.5–5.3)
POTASSIUM SERPL-SCNC: 3.9 MMOL/L — SIGNIFICANT CHANGE UP (ref 3.5–5.3)
PROTHROM AB SERPL-ACNC: 13.4 SEC — HIGH (ref 9.8–12.7)
RBC # BLD: 3.07 M/UL — LOW (ref 3.8–5.2)
RBC # FLD: 15.2 % — HIGH (ref 10.3–14.5)
SODIUM SERPL-SCNC: 141 MMOL/L — SIGNIFICANT CHANGE UP (ref 135–145)
WBC # BLD: 7.5 K/UL — SIGNIFICANT CHANGE UP (ref 3.8–10.5)
WBC # FLD AUTO: 7.5 K/UL — SIGNIFICANT CHANGE UP (ref 3.8–10.5)

## 2017-11-15 PROCEDURE — 99239 HOSP IP/OBS DSCHRG MGMT >30: CPT

## 2017-11-15 PROCEDURE — 99233 SBSQ HOSP IP/OBS HIGH 50: CPT

## 2017-11-15 RX ORDER — OXYCODONE HYDROCHLORIDE 5 MG/1
1 TABLET ORAL
Qty: 0 | Refills: 0 | COMMUNITY
Start: 2017-11-15

## 2017-11-15 RX ORDER — ONDANSETRON 8 MG/1
0 TABLET, FILM COATED ORAL
Qty: 0 | Refills: 0 | COMMUNITY
Start: 2017-11-15

## 2017-11-15 RX ORDER — NYSTATIN CREAM 100000 [USP'U]/G
1 CREAM TOPICAL
Qty: 0 | Refills: 0 | COMMUNITY
Start: 2017-11-15

## 2017-11-15 RX ORDER — SENNA PLUS 8.6 MG/1
2 TABLET ORAL
Qty: 0 | Refills: 0 | COMMUNITY
Start: 2017-11-15

## 2017-11-15 RX ORDER — GABAPENTIN 400 MG/1
100 CAPSULE ORAL THREE TIMES A DAY
Qty: 0 | Refills: 0 | Status: DISCONTINUED | OUTPATIENT
Start: 2017-11-15 | End: 2017-11-15

## 2017-11-15 RX ORDER — ACETAMINOPHEN 500 MG
2 TABLET ORAL
Qty: 0 | Refills: 0 | COMMUNITY
Start: 2017-11-15

## 2017-11-15 RX ORDER — POLYETHYLENE GLYCOL 3350 17 G/17G
17 POWDER, FOR SOLUTION ORAL
Qty: 0 | Refills: 0 | COMMUNITY
Start: 2017-11-15

## 2017-11-15 RX ORDER — DOCUSATE SODIUM 100 MG
1 CAPSULE ORAL
Qty: 0 | Refills: 0 | COMMUNITY
Start: 2017-11-15

## 2017-11-15 RX ORDER — SODIUM CHLORIDE 9 MG/ML
500 INJECTION INTRAMUSCULAR; INTRAVENOUS; SUBCUTANEOUS ONCE
Qty: 0 | Refills: 0 | Status: COMPLETED | OUTPATIENT
Start: 2017-11-15 | End: 2017-11-15

## 2017-11-15 RX ADMIN — Medication 650 MILLIGRAM(S): at 06:12

## 2017-11-15 RX ADMIN — OXYCODONE HYDROCHLORIDE 10 MILLIGRAM(S): 5 TABLET ORAL at 17:38

## 2017-11-15 RX ADMIN — Medication 100 MILLIGRAM(S): at 17:20

## 2017-11-15 RX ADMIN — NYSTATIN CREAM 1 APPLICATION(S): 100000 CREAM TOPICAL at 17:38

## 2017-11-15 RX ADMIN — OXYCODONE HYDROCHLORIDE 10 MILLIGRAM(S): 5 TABLET ORAL at 07:43

## 2017-11-15 RX ADMIN — NYSTATIN CREAM 1 APPLICATION(S): 100000 CREAM TOPICAL at 06:13

## 2017-11-15 RX ADMIN — Medication 1 TABLET(S): at 12:09

## 2017-11-15 RX ADMIN — GABAPENTIN 300 MILLIGRAM(S): 400 CAPSULE ORAL at 06:11

## 2017-11-15 RX ADMIN — Medication 100 MILLIGRAM(S): at 17:22

## 2017-11-15 RX ADMIN — Medication 650 MILLIGRAM(S): at 07:10

## 2017-11-15 RX ADMIN — LOSARTAN POTASSIUM 100 MILLIGRAM(S): 100 TABLET, FILM COATED ORAL at 06:12

## 2017-11-15 RX ADMIN — Medication 100 MILLIGRAM(S): at 06:11

## 2017-11-15 RX ADMIN — Medication 100 MILLIGRAM(S): at 06:12

## 2017-11-15 RX ADMIN — SODIUM CHLORIDE 500 MILLILITER(S): 9 INJECTION INTRAMUSCULAR; INTRAVENOUS; SUBCUTANEOUS at 10:43

## 2017-11-15 RX ADMIN — OXYCODONE HYDROCHLORIDE 10 MILLIGRAM(S): 5 TABLET ORAL at 08:15

## 2017-11-15 RX ADMIN — GABAPENTIN 100 MILLIGRAM(S): 400 CAPSULE ORAL at 13:47

## 2017-11-15 RX ADMIN — PANTOPRAZOLE SODIUM 40 MILLIGRAM(S): 20 TABLET, DELAYED RELEASE ORAL at 06:13

## 2017-11-15 NOTE — PROGRESS NOTE ADULT - SUBJECTIVE AND OBJECTIVE BOX
CARITO CONSTANTINO is a 82yFemale , patient examined and chart reviewed. Patient seen and examined today being followed for gangrene of left 2nd toe .        INTERVAL HPI/ OVERNIGHT EVENTS: Events noted, had episode of hypotension , has SOB. had PICC placed. 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  Osteoarthritis  Afib  PVD (peripheral vascular disease)  Neuropathy: (Right lower leg)  H/O cerebral aneurysm repair: brain clips  CVA (cerebral vascular accident): (&quot;Mini-stroke&quot;,1990&#x27;s)  Rheumatoid arthritis  Hyperlipidemia  Hypertension  S/P cataract surgery: (Left eye)  Elective surgery: (&quot;Twisted bowel&quot;, 2014)  Elective surgery: (Exision of cyst on liver, 1985)  S/P ORIF (open reduction internal fixation) fracture: Left hip fx (2012) &amp; R hip fx (2013)  H/O cerebral aneurysm repair: Brain clips (1978(  Renal stone: Cysto stent placement 10/1/2014  PVD (peripheral vascular disease): s/p RLE bypass x 3, most recent 3/2012 Right external iliac to PT bypass w/ PTFE (2012)  S/P FRED (total abdominal hysterectomy): (1987, Hx of &quot;ovarian cancer?&quot;)      For details regarding the patient's social history, family history, and other miscellaneous elements, please refer the initial infectious diseases consultation and/or the admitting history and physical examination for this admission.      ROS:  CONSTITUTIONAL:  Negative fever or chills, feels well, good appetite  EYES:  Negative  blurry vision or double vision  CARDIOVASCULAR:  Negative for chest pain or palpitations  RESPIRATORY:  positive for  SOB   GASTROINTESTINAL:  Negative for nausea, vomiting, diarrhea, constipation, or abdominal pain  GENITOURINARY:  Negative frequency, urgency or dysuria  NEUROLOGIC:  No headache, confusion, dizziness, lightheadedness  All other systems were reviewed and are negative         Current inpatient medications :    ANTIBIOTICS/RELEVANT:  ceFAZolin   IVPB 1000 milliGRAM(s) IV Intermittent every 12 hours      acetaminophen   Tablet. 650 milliGRAM(s) Oral every 6 hours PRN  atorvastatin 40 milliGRAM(s) Oral at bedtime  diltiazem    Tablet 60 milliGRAM(s) Oral every 6 hours  docusate sodium 100 milliGRAM(s) Oral two times a day PRN  gabapentin 100 milliGRAM(s) Oral three times a day  losartan 100 milliGRAM(s) Oral daily  metoprolol     tartrate 100 milliGRAM(s) Oral two times a day  multivitamin 1 Tablet(s) Oral daily  nystatin Powder 1 Application(s) Topical two times a day  ondansetron Injectable 4 milliGRAM(s) IV Push once PRN  oxyCODONE    IR 10 milliGRAM(s) Oral every 4 hours PRN  oxyCODONE    IR 5 milliGRAM(s) Oral every 4 hours PRN  pantoprazole    Tablet 40 milliGRAM(s) Oral before breakfast  polyethylene glycol 3350 17 Gram(s) Oral daily PRN  senna 2 Tablet(s) Oral at bedtime      Objective:    11-14 @ 07:01  -  11-15 @ 07:00  --------------------------------------------------------  IN: 360 mL / OUT: 0 mL / NET: 360 mL    11-15 @ 07:01  -  11-15 @ 11:13  --------------------------------------------------------  IN: 500 mL / OUT: 0 mL / NET: 500 mL      T(C): 36.6 (11-15-17 @ 05:08), Max: 37.1 (11-14-17 @ 12:07)  HR: 75 (11-15-17 @ 05:08) (66 - 75)  BP: 115/65 (11-15-17 @ 05:08) (92/61 - 122/77)  RR: 17 (11-15-17 @ 05:08) (16 - 17)  SpO2: 97% (11-15-17 @ 05:08) (93% - 97%)  Wt(kg): --      Physical Exam:  General: well developed well nourished, in no acute distress  Eyes: sclera anicteric, pupils equal and reactive to light  ENMT: buccal mucosa moist, pharynx not injected  Neck: supple, trachea midline  Lungs: clear, no wheeze/rhonchi  Cardiovascular: regular rate and rhythm, S1 S2  Abdomen: soft, nontender, no organomegaly present, bowel sounds normal  Neurological: alert and oriented x3, Cranial Nerves II-XII grossly intact  Skin: no increased ecchymosis/petechiae/purpura  Lymph Nodes: no palpable cervical/supraclavicular lymph nodes enlargements  Extremities: no cyanosis/clubbing,  left foot dressing in place       LABS:                          8.6    7.5   )-----------( 246      ( 15 Nov 2017 06:35 )             28.5       11-15    141  |  105  |  26<H>  ----------------------------<  91  3.9   |  28  |  1.30    Ca    8.8      15 Nov 2017 06:35        PT/INR - ( 15 Nov 2017 06:35 )   PT: 13.4 sec;   INR: 1.22 ratio         PTT - ( 15 Nov 2017 06:35 )  PTT:33.7 sec       Assessment:   82 y.o. female with multiple medical problems Afib on Coumadin, HLD, CVA, MVP, OA, HTN, PVD (s/p RLE bypassx3, most recently 2012), Neuropathy, GERD  presents with  ischemic right foot and leg coupled with gangrene of right 2nd. toe.  She has multiple failed bypasses of right leg. 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 Her pain is out of proportion to the toe infection and likely is related to ischemic pain right leg .    She is scheduled for angiogram of right leg as recommended by vascular surgery , podiatry still feels the gangrenous toe should be amputated and then try HBOT     She has fecal impaction , most likely cause of abdominal pain.     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 She has severe PVD with occluded grafts , she is s/p resection of the right   2nd toe thru the metatarsal head     If SOB persists consider CTA chest to rule out PE     Plan :   - will continue with IV Ancef 1 gram q 8 hours as it is MSSA, will discuss with Podiatry if patient needs home IV antibiotics for 2-4 weeks  till wound heals as she has severe PVD and may not be appropriate  from po antibiotics   - wound care as per podiatry   - pain control ? use of fentanyl patch   - trend renal function and CBC  - overall prognosis is poor. Discussed with Dr. Lucas       Continue with present regime .  Appropriate use of antibiotics and adverse effects reviewed.    I have discussed the above plan of care with patient and her  family in detail. They expressed understanding of the  treatment plan . Risks, benefits and alternatives discussed in detail. I have asked if they have any questions or concerns and appropriately addressed them to the best of my ability .    > 35 minutes were spent in direct patient care reviewing notes, medications ,labs data/ imaging , discussion with multidisciplinary team.    Thank you for allowing me to participate in care of your patient .    Judi Coyne MD  [phone]

## 2017-11-15 NOTE — PROGRESS NOTE ADULT - SUBJECTIVE AND OBJECTIVE BOX
[facility] - Dahiana Bañuelos, Evens, Toney, Terrie, Filiberto Laughlin  Office Number:  [phone]    Patient resting comfortably in bed in NAD.  Laying flat with no respiratory distress.  No complaints of chest pain, dyspnea, palpitations, PND, or orthopnea.    ROS: negative unless otherwise mentioned.    Telemetry:  Not on tele    MEDICATIONS  (STANDING):  atorvastatin 40 milliGRAM(s) Oral at bedtime  ceFAZolin   IVPB 1000 milliGRAM(s) IV Intermittent every 12 hours  diltiazem    Tablet 60 milliGRAM(s) Oral every 6 hours  gabapentin 100 milliGRAM(s) Oral three times a day  losartan 100 milliGRAM(s) Oral daily  metoprolol     tartrate 100 milliGRAM(s) Oral two times a day  multivitamin 1 Tablet(s) Oral daily  nystatin Powder 1 Application(s) Topical two times a day  pantoprazole    Tablet 40 milliGRAM(s) Oral before breakfast  senna 2 Tablet(s) Oral at bedtime    MEDICATIONS  (PRN):  acetaminophen   Tablet. 650 milliGRAM(s) Oral every 6 hours PRN Mild Pain (1 - 3)  docusate sodium 100 milliGRAM(s) Oral two times a day PRN Constipation  ondansetron Injectable 4 milliGRAM(s) IV Push once PRN Nausea and/or Vomiting  oxyCODONE    IR 10 milliGRAM(s) Oral every 4 hours PRN Severe Pain (7 - 10)  oxyCODONE    IR 5 milliGRAM(s) Oral every 4 hours PRN Moderate Pain (4 - 6)  polyethylene glycol 3350 17 Gram(s) Oral daily PRN Constipation      Allergies    No Known Allergies    Intolerances        Vital Signs Last 24 Hrs  T(C): 36.6 (15 Nov 2017 05:08), Max: 37.1 (14 Nov 2017 12:07)  T(F): 97.8 (15 Nov 2017 05:08), Max: 98.8 (14 Nov 2017 12:07)  HR: 75 (15 Nov 2017 05:08) (66 - 75)  BP: 115/65 (15 Nov 2017 05:08) (92/61 - 122/77)  BP(mean): --  RR: 17 (15 Nov 2017 05:08) (16 - 17)  SpO2: 97% (15 Nov 2017 05:08) (93% - 97%)    I&O's Summary    14 Nov 2017 07:01  -  15 Nov 2017 07:00  --------------------------------------------------------  IN: 360 mL / OUT: 0 mL / NET: 360 mL    15 Nov 2017 07:01  -  15 Nov 2017 11:13  --------------------------------------------------------  IN: 500 mL / OUT: 0 mL / NET: 500 mL        ON EXAM:    Constitutional: NAD, awake and alert, well-developed  HEENT: Moist Mucous Membranes, Anicteric  Pulmonary: Decreased breath sounds b/l.   Cardiovascular: Regular, S1 and S2, 2/6 SM  Gastrointestinal: Bowel Sounds present, soft, nontender.   Lymph: No peripheral edema. No lymphadenopathy.  Skin: No visible rashes or ulcers.  Psych:  Mood & affect appropriate    LABS: All Labs Reviewed:                        8.6    7.5   )-----------( 246      ( 15 Nov 2017 06:35 )             28.5                         9.1    8.3   )-----------( 251      ( 14 Nov 2017 06:44 )             29.6                         8.9    8.7   )-----------( 264      ( 13 Nov 2017 06:39 )             29.4     15 Nov 2017 06:35    141    |  105    |  26     ----------------------------<  91     3.9     |  28     |  1.30   14 Nov 2017 06:44    141    |  103    |  26     ----------------------------<  97     3.6     |  27     |  1.40   13 Nov 2017 06:39    141    |  104    |  22     ----------------------------<  99     3.5     |  28     |  1.20     Ca    8.8        15 Nov 2017 06:35  Ca    8.7        14 Nov 2017 06:44  Ca    8.2        13 Nov 2017 06:39    TPro  6.2    /  Alb  2.3    /  TBili  0.4    /  DBili  x      /  AST  11     /  ALT  12     /  AlkPhos  64     13 Nov 2017 06:39    PT/INR - ( 15 Nov 2017 06:35 )   PT: 13.4 sec;   INR: 1.22 ratio         PTT - ( 15 Nov 2017 06:35 )  PTT:33.7 sec      Blood Culture:

## 2017-11-15 NOTE — PROGRESS NOTE ADULT - PROVIDER SPECIALTY LIST ADULT
Anesthesia
Cardiology
Gastroenterology
Heme/Onc
Hospitalist
Infectious Disease
Podiatry
Surgery
Vascular Surgery
Infectious Disease
Infectious Disease
Cardiology
Heme/Onc
Hospitalist
Gastroenterology
Hospitalist

## 2017-11-15 NOTE — PROGRESS NOTE ADULT - SUBJECTIVE AND OBJECTIVE BOX
Interval Events: Pt denies abdominal pain, nausea, vomiting, diarrhea, constipation, BRBPR, melena.      HPI:82F PMHx Afib on Coumadin, HLD, CVA, MVP, OA, HTN, PVD (s/p RLE bypassx3, most recently 2012), Neuropathy, GERD presents with pain and swelling to right second toe that started 1 month ago. Patient states foot became red, painful, and a small wound with a scab started forming on the top portion of her second toe. It became extremely difficult to bear weight on the foot so she went to see Dr. Isaacs at the wound center and he drained the wound at which point, yellow pus drained from the wound. He prescribed her an antibiotic (she does not recall which one). She denies any fevers, chills, chest pain or shortness of breat. Admits to joint pain due to her RA.     In ED, pt's vital was wnl. WBC 8.2, Hct: 26.1, lactate 0.8, , CMP revealed elevated bilirubin of 1.5, remainder is wnl , INR: 2.26. CXR showed no acute pathology. X ray of Rt foot showed possible small periarticular erosion medial aspect head of the proximal phalanx which might represent infection/ osteomyelitis. Pt received 1 dose  of  vanco and 1 dose of zosyn and 1 bolus of NS, BCX, UCX was sent in  ED. (28 Oct 2017 13:42)    MEDICATIONS  (STANDING):  atorvastatin 40 milliGRAM(s) Oral at bedtime  ceFAZolin   IVPB 1000 milliGRAM(s) IV Intermittent every 12 hours  diltiazem    Tablet 60 milliGRAM(s) Oral every 6 hours  gabapentin 100 milliGRAM(s) Oral three times a day  losartan 100 milliGRAM(s) Oral daily  metoprolol     tartrate 100 milliGRAM(s) Oral two times a day  multivitamin 1 Tablet(s) Oral daily  nystatin Powder 1 Application(s) Topical two times a day  pantoprazole    Tablet 40 milliGRAM(s) Oral before breakfast  senna 2 Tablet(s) Oral at bedtime    MEDICATIONS  (PRN):  acetaminophen   Tablet. 650 milliGRAM(s) Oral every 6 hours PRN Mild Pain (1 - 3)  docusate sodium 100 milliGRAM(s) Oral two times a day PRN Constipation  ondansetron Injectable 4 milliGRAM(s) IV Push once PRN Nausea and/or Vomiting  oxyCODONE    IR 10 milliGRAM(s) Oral every 4 hours PRN Severe Pain (7 - 10)  oxyCODONE    IR 5 milliGRAM(s) Oral every 4 hours PRN Moderate Pain (4 - 6)  polyethylene glycol 3350 17 Gram(s) Oral daily PRN Constipation      Allergies    No Known Allergies    Intolerances        Review of Systems:    General:  No wt loss, fevers, chills, night sweats,fatigue,   Eyes:  Good vision, no reported pain  ENT:  No sore throat, pain, runny nose, dysphagia  CV:  No pain, palpitations, hypo/hypertension  Resp:  No dyspnea, cough, tachypnea, wheezing  GI:  No pain, No nausea, No vomiting, No diarrhea, No constipation, No weight loss, No fever, No pruritis, No rectal bleeding, No melena, No dysphagia  :  No pain, bleeding, incontinence, nocturia  Muscle:  +R foot pain  Neuro:  No weakness, tingling, memory problems  Psych:  No fatigue, insomnia, mood problems, depression  Endocrine:  No polyuria, polydypsia, cold/heat intolerance  Heme:  No petechiae, ecchymosis, easy bruisability  Skin:  No rash, tattoos, scars, edema    Vital Signs Last 24 Hrs  T(C): 36.6 (15 Nov 2017 05:08), Max: 37.1 (14 Nov 2017 12:07)  T(F): 97.8 (15 Nov 2017 05:08), Max: 98.8 (14 Nov 2017 12:07)  HR: 75 (15 Nov 2017 05:08) (66 - 75)  BP: 115/65 (15 Nov 2017 05:08) (92/61 - 122/77)  BP(mean): --  RR: 17 (15 Nov 2017 05:08) (16 - 17)  SpO2: 97% (15 Nov 2017 05:08) (93% - 97%)    PHYSICAL EXAM:    Constitutional: NAD, well-developed  HEENT: EOMI, throat clear  Neck: No LAD, supple  Respiratory: CTA and P  Cardiovascular: S1 and S2, RRR, no M  Gastrointestinal: BS+, soft, NT/ND, neg HSM,  Extremities: No peripheral edema, s/p R foot amputation   Vascular: 2+ peripheral pulses  Neurological: A/O x 3, no focal deficits  Psychiatric: Normal mood, normal affect  Skin: No rashes        LABS:                        8.6    7.5   )-----------( 246      ( 15 Nov 2017 06:35 )             28.5     11-15    141  |  105  |  26<H>  ----------------------------<  91  3.9   |  28  |  1.30    Ca    8.8      15 Nov 2017 06:35      PT/INR - ( 15 Nov 2017 06:35 )   PT: 13.4 sec;   INR: 1.22 ratio         PTT - ( 15 Nov 2017 06:35 )  PTT:33.7 sec      RADIOLOGY & ADDITIONAL TESTS:

## 2017-11-22 ENCOUNTER — OUTPATIENT (OUTPATIENT)
Dept: OUTPATIENT SERVICES | Facility: HOSPITAL | Age: 82
LOS: 1 days | Discharge: HOME | End: 2017-11-22
Payer: MEDICARE

## 2017-11-22 DIAGNOSIS — Z41.9 ENCOUNTER FOR PROCEDURE FOR PURPOSES OTHER THAN REMEDYING HEALTH STATE, UNSPECIFIED: Chronic | ICD-10-CM

## 2017-11-22 DIAGNOSIS — S91.309A UNSPECIFIED OPEN WOUND, UNSPECIFIED FOOT, INITIAL ENCOUNTER: ICD-10-CM

## 2017-11-22 DIAGNOSIS — Z98.49 CATARACT EXTRACTION STATUS, UNSPECIFIED EYE: Chronic | ICD-10-CM

## 2017-11-22 DIAGNOSIS — Z96.7 PRESENCE OF OTHER BONE AND TENDON IMPLANTS: Chronic | ICD-10-CM

## 2017-11-22 DIAGNOSIS — Z98.89 OTHER SPECIFIED POSTPROCEDURAL STATES: Chronic | ICD-10-CM

## 2017-11-22 DIAGNOSIS — N20.0 CALCULUS OF KIDNEY: Chronic | ICD-10-CM

## 2017-11-22 PROCEDURE — G0463: CPT

## 2017-11-25 DIAGNOSIS — D64.9 ANEMIA, UNSPECIFIED: ICD-10-CM

## 2017-11-25 DIAGNOSIS — M86.171 OTHER ACUTE OSTEOMYELITIS, RIGHT ANKLE AND FOOT: ICD-10-CM

## 2017-11-25 DIAGNOSIS — K21.9 GASTRO-ESOPHAGEAL REFLUX DISEASE WITHOUT ESOPHAGITIS: ICD-10-CM

## 2017-11-25 DIAGNOSIS — Z89.421 ACQUIRED ABSENCE OF OTHER RIGHT TOE(S): ICD-10-CM

## 2017-11-25 DIAGNOSIS — I34.1 NONRHEUMATIC MITRAL (VALVE) PROLAPSE: ICD-10-CM

## 2017-11-25 DIAGNOSIS — L03.031 CELLULITIS OF RIGHT TOE: ICD-10-CM

## 2017-11-25 DIAGNOSIS — L97.512 NON-PRESSURE CHRONIC ULCER OF OTHER PART OF RIGHT FOOT WITH FAT LAYER EXPOSED: ICD-10-CM

## 2017-11-25 DIAGNOSIS — E78.5 HYPERLIPIDEMIA, UNSPECIFIED: ICD-10-CM

## 2017-11-25 DIAGNOSIS — I73.9 PERIPHERAL VASCULAR DISEASE, UNSPECIFIED: ICD-10-CM

## 2017-11-25 DIAGNOSIS — I48.91 UNSPECIFIED ATRIAL FIBRILLATION: ICD-10-CM

## 2017-11-25 DIAGNOSIS — M79.604 PAIN IN RIGHT LEG: ICD-10-CM

## 2017-11-29 ENCOUNTER — OUTPATIENT (OUTPATIENT)
Dept: OUTPATIENT SERVICES | Facility: HOSPITAL | Age: 82
LOS: 1 days | Discharge: HOME | End: 2017-11-29
Payer: MEDICARE

## 2017-11-29 DIAGNOSIS — Z96.7 PRESENCE OF OTHER BONE AND TENDON IMPLANTS: Chronic | ICD-10-CM

## 2017-11-29 DIAGNOSIS — Z41.9 ENCOUNTER FOR PROCEDURE FOR PURPOSES OTHER THAN REMEDYING HEALTH STATE, UNSPECIFIED: Chronic | ICD-10-CM

## 2017-11-29 DIAGNOSIS — N20.0 CALCULUS OF KIDNEY: Chronic | ICD-10-CM

## 2017-11-29 DIAGNOSIS — Z98.49 CATARACT EXTRACTION STATUS, UNSPECIFIED EYE: Chronic | ICD-10-CM

## 2017-11-29 DIAGNOSIS — Z98.89 OTHER SPECIFIED POSTPROCEDURAL STATES: Chronic | ICD-10-CM

## 2017-11-29 DIAGNOSIS — M86.171 OTHER ACUTE OSTEOMYELITIS, RIGHT ANKLE AND FOOT: ICD-10-CM

## 2017-11-29 PROCEDURE — G0463: CPT

## 2017-12-03 DIAGNOSIS — I48.91 UNSPECIFIED ATRIAL FIBRILLATION: ICD-10-CM

## 2017-12-03 DIAGNOSIS — I70.235 ATHEROSCLEROSIS OF NATIVE ARTERIES OF RIGHT LEG WITH ULCERATION OF OTHER PART OF FOOT: ICD-10-CM

## 2017-12-03 DIAGNOSIS — D64.9 ANEMIA, UNSPECIFIED: ICD-10-CM

## 2017-12-03 DIAGNOSIS — M86.671 OTHER CHRONIC OSTEOMYELITIS, RIGHT ANKLE AND FOOT: ICD-10-CM

## 2017-12-03 DIAGNOSIS — Z89.421 ACQUIRED ABSENCE OF OTHER RIGHT TOE(S): ICD-10-CM

## 2017-12-03 DIAGNOSIS — I34.1 NONRHEUMATIC MITRAL (VALVE) PROLAPSE: ICD-10-CM

## 2017-12-03 DIAGNOSIS — L97.512 NON-PRESSURE CHRONIC ULCER OF OTHER PART OF RIGHT FOOT WITH FAT LAYER EXPOSED: ICD-10-CM

## 2017-12-03 DIAGNOSIS — K21.9 GASTRO-ESOPHAGEAL REFLUX DISEASE WITHOUT ESOPHAGITIS: ICD-10-CM

## 2017-12-03 DIAGNOSIS — E78.5 HYPERLIPIDEMIA, UNSPECIFIED: ICD-10-CM

## 2017-12-06 ENCOUNTER — OUTPATIENT (OUTPATIENT)
Dept: OUTPATIENT SERVICES | Facility: HOSPITAL | Age: 82
LOS: 1 days | Discharge: HOME | End: 2017-12-06
Payer: MEDICARE

## 2017-12-06 DIAGNOSIS — Z98.49 CATARACT EXTRACTION STATUS, UNSPECIFIED EYE: Chronic | ICD-10-CM

## 2017-12-06 DIAGNOSIS — Z41.9 ENCOUNTER FOR PROCEDURE FOR PURPOSES OTHER THAN REMEDYING HEALTH STATE, UNSPECIFIED: Chronic | ICD-10-CM

## 2017-12-06 DIAGNOSIS — Z96.7 PRESENCE OF OTHER BONE AND TENDON IMPLANTS: Chronic | ICD-10-CM

## 2017-12-06 DIAGNOSIS — Z98.89 OTHER SPECIFIED POSTPROCEDURAL STATES: Chronic | ICD-10-CM

## 2017-12-06 DIAGNOSIS — N20.0 CALCULUS OF KIDNEY: Chronic | ICD-10-CM

## 2017-12-06 DIAGNOSIS — M86.171 OTHER ACUTE OSTEOMYELITIS, RIGHT ANKLE AND FOOT: ICD-10-CM

## 2017-12-06 PROCEDURE — G0463: CPT

## 2017-12-07 DIAGNOSIS — Z79.899 OTHER LONG TERM (CURRENT) DRUG THERAPY: ICD-10-CM

## 2017-12-07 DIAGNOSIS — M86.671 OTHER CHRONIC OSTEOMYELITIS, RIGHT ANKLE AND FOOT: ICD-10-CM

## 2017-12-07 DIAGNOSIS — Z79.01 LONG TERM (CURRENT) USE OF ANTICOAGULANTS: ICD-10-CM

## 2017-12-07 DIAGNOSIS — I34.1 NONRHEUMATIC MITRAL (VALVE) PROLAPSE: ICD-10-CM

## 2017-12-07 DIAGNOSIS — Z89.421 ACQUIRED ABSENCE OF OTHER RIGHT TOE(S): ICD-10-CM

## 2017-12-07 DIAGNOSIS — E78.5 HYPERLIPIDEMIA, UNSPECIFIED: ICD-10-CM

## 2017-12-07 DIAGNOSIS — D64.9 ANEMIA, UNSPECIFIED: ICD-10-CM

## 2017-12-07 DIAGNOSIS — Z48.02 ENCOUNTER FOR REMOVAL OF SUTURES: ICD-10-CM

## 2017-12-07 DIAGNOSIS — L97.512 NON-PRESSURE CHRONIC ULCER OF OTHER PART OF RIGHT FOOT WITH FAT LAYER EXPOSED: ICD-10-CM

## 2017-12-07 DIAGNOSIS — Z87.891 PERSONAL HISTORY OF NICOTINE DEPENDENCE: ICD-10-CM

## 2017-12-07 DIAGNOSIS — Z82.49 FAMILY HISTORY OF ISCHEMIC HEART DISEASE AND OTHER DISEASES OF THE CIRCULATORY SYSTEM: ICD-10-CM

## 2017-12-07 DIAGNOSIS — I48.91 UNSPECIFIED ATRIAL FIBRILLATION: ICD-10-CM

## 2017-12-07 DIAGNOSIS — I70.235 ATHEROSCLEROSIS OF NATIVE ARTERIES OF RIGHT LEG WITH ULCERATION OF OTHER PART OF FOOT: ICD-10-CM

## 2017-12-07 DIAGNOSIS — K21.9 GASTRO-ESOPHAGEAL REFLUX DISEASE WITHOUT ESOPHAGITIS: ICD-10-CM

## 2017-12-13 ENCOUNTER — OUTPATIENT (OUTPATIENT)
Dept: OUTPATIENT SERVICES | Facility: HOSPITAL | Age: 82
LOS: 1 days | Discharge: HOME | End: 2017-12-13
Payer: COMMERCIAL

## 2017-12-13 DIAGNOSIS — M86.171 OTHER ACUTE OSTEOMYELITIS, RIGHT ANKLE AND FOOT: ICD-10-CM

## 2017-12-13 DIAGNOSIS — N20.0 CALCULUS OF KIDNEY: Chronic | ICD-10-CM

## 2017-12-13 DIAGNOSIS — Z96.7 PRESENCE OF OTHER BONE AND TENDON IMPLANTS: Chronic | ICD-10-CM

## 2017-12-13 DIAGNOSIS — Z98.49 CATARACT EXTRACTION STATUS, UNSPECIFIED EYE: Chronic | ICD-10-CM

## 2017-12-13 DIAGNOSIS — Z41.9 ENCOUNTER FOR PROCEDURE FOR PURPOSES OTHER THAN REMEDYING HEALTH STATE, UNSPECIFIED: Chronic | ICD-10-CM

## 2017-12-13 DIAGNOSIS — Z98.89 OTHER SPECIFIED POSTPROCEDURAL STATES: Chronic | ICD-10-CM

## 2017-12-13 LAB
ALBUMIN SERPL ELPH-MCNC: 3.4 G/DL — SIGNIFICANT CHANGE UP (ref 3.3–5)
ALP SERPL-CCNC: 96 U/L — SIGNIFICANT CHANGE UP (ref 40–120)
ALT FLD-CCNC: 10 U/L — LOW (ref 12–78)
ANION GAP SERPL CALC-SCNC: 6 MMOL/L — SIGNIFICANT CHANGE UP (ref 5–17)
AST SERPL-CCNC: 15 U/L — SIGNIFICANT CHANGE UP (ref 15–37)
BASOPHILS # BLD AUTO: 0.1 K/UL — SIGNIFICANT CHANGE UP (ref 0–0.2)
BASOPHILS NFR BLD AUTO: 0.8 % — SIGNIFICANT CHANGE UP (ref 0–2)
BILIRUB SERPL-MCNC: 0.6 MG/DL — SIGNIFICANT CHANGE UP (ref 0.2–1.2)
BUN SERPL-MCNC: 27 MG/DL — HIGH (ref 7–23)
CALCIUM SERPL-MCNC: 9 MG/DL — SIGNIFICANT CHANGE UP (ref 8.5–10.1)
CHLORIDE SERPL-SCNC: 108 MMOL/L — SIGNIFICANT CHANGE UP (ref 96–108)
CO2 SERPL-SCNC: 30 MMOL/L — SIGNIFICANT CHANGE UP (ref 22–31)
CREAT SERPL-MCNC: 1 MG/DL — SIGNIFICANT CHANGE UP (ref 0.5–1.3)
EOSINOPHIL # BLD AUTO: 0.4 K/UL — SIGNIFICANT CHANGE UP (ref 0–0.5)
EOSINOPHIL NFR BLD AUTO: 4.2 % — SIGNIFICANT CHANGE UP (ref 0–6)
ERYTHROCYTE [SEDIMENTATION RATE] IN BLOOD: 38 MM/HR — HIGH (ref 0–20)
GLUCOSE SERPL-MCNC: 112 MG/DL — HIGH (ref 70–99)
HBA1C BLD-MCNC: 5.4 % — SIGNIFICANT CHANGE UP (ref 4–5.6)
HCT VFR BLD CALC: 34.9 % — SIGNIFICANT CHANGE UP (ref 34.5–45)
HGB BLD-MCNC: 10.8 G/DL — LOW (ref 11.5–15.5)
LYMPHOCYTES # BLD AUTO: 2.3 K/UL — SIGNIFICANT CHANGE UP (ref 1–3.3)
LYMPHOCYTES # BLD AUTO: 21.3 % — SIGNIFICANT CHANGE UP (ref 13–44)
MCHC RBC-ENTMCNC: 28.4 PG — SIGNIFICANT CHANGE UP (ref 27–34)
MCHC RBC-ENTMCNC: 30.9 GM/DL — LOW (ref 32–36)
MCV RBC AUTO: 91.8 FL — SIGNIFICANT CHANGE UP (ref 80–100)
MONOCYTES # BLD AUTO: 0.8 K/UL — SIGNIFICANT CHANGE UP (ref 0–0.9)
MONOCYTES NFR BLD AUTO: 7 % — SIGNIFICANT CHANGE UP (ref 1–9)
NEUTROPHILS # BLD AUTO: 7.1 K/UL — SIGNIFICANT CHANGE UP (ref 1.8–7.4)
NEUTROPHILS NFR BLD AUTO: 66.7 % — SIGNIFICANT CHANGE UP (ref 43–77)
PLATELET # BLD AUTO: 245 K/UL — SIGNIFICANT CHANGE UP (ref 150–400)
POTASSIUM SERPL-MCNC: 3.6 MMOL/L — SIGNIFICANT CHANGE UP (ref 3.5–5.3)
POTASSIUM SERPL-SCNC: 3.6 MMOL/L — SIGNIFICANT CHANGE UP (ref 3.5–5.3)
PREALB SERPL-MCNC: 34.1 MG/DL — SIGNIFICANT CHANGE UP (ref 20–40)
PROT SERPL-MCNC: 7.2 G/DL — SIGNIFICANT CHANGE UP (ref 6–8.3)
RBC # BLD: 3.81 M/UL — SIGNIFICANT CHANGE UP (ref 3.8–5.2)
RBC # FLD: 15.8 % — HIGH (ref 10.3–14.5)
SODIUM SERPL-SCNC: 144 MMOL/L — SIGNIFICANT CHANGE UP (ref 135–145)
WBC # BLD: 10.7 K/UL — HIGH (ref 3.8–10.5)
WBC # FLD AUTO: 10.7 K/UL — HIGH (ref 3.8–10.5)

## 2017-12-13 PROCEDURE — 71046 X-RAY EXAM CHEST 2 VIEWS: CPT

## 2017-12-13 PROCEDURE — 85027 COMPLETE CBC AUTOMATED: CPT

## 2017-12-13 PROCEDURE — 71020: CPT | Mod: 26

## 2017-12-13 PROCEDURE — 85652 RBC SED RATE AUTOMATED: CPT

## 2017-12-13 PROCEDURE — G0463: CPT

## 2017-12-13 PROCEDURE — 80053 COMPREHEN METABOLIC PANEL: CPT

## 2017-12-13 PROCEDURE — 86140 C-REACTIVE PROTEIN: CPT

## 2017-12-13 PROCEDURE — 84134 ASSAY OF PREALBUMIN: CPT

## 2017-12-13 PROCEDURE — 83036 HEMOGLOBIN GLYCOSYLATED A1C: CPT

## 2017-12-14 DIAGNOSIS — K21.9 GASTRO-ESOPHAGEAL REFLUX DISEASE WITHOUT ESOPHAGITIS: ICD-10-CM

## 2017-12-14 DIAGNOSIS — E78.5 HYPERLIPIDEMIA, UNSPECIFIED: ICD-10-CM

## 2017-12-14 DIAGNOSIS — M86.671 OTHER CHRONIC OSTEOMYELITIS, RIGHT ANKLE AND FOOT: ICD-10-CM

## 2017-12-14 DIAGNOSIS — Z79.899 OTHER LONG TERM (CURRENT) DRUG THERAPY: ICD-10-CM

## 2017-12-14 DIAGNOSIS — Z82.49 FAMILY HISTORY OF ISCHEMIC HEART DISEASE AND OTHER DISEASES OF THE CIRCULATORY SYSTEM: ICD-10-CM

## 2017-12-14 DIAGNOSIS — I48.91 UNSPECIFIED ATRIAL FIBRILLATION: ICD-10-CM

## 2017-12-14 DIAGNOSIS — Z89.421 ACQUIRED ABSENCE OF OTHER RIGHT TOE(S): ICD-10-CM

## 2017-12-14 DIAGNOSIS — D64.9 ANEMIA, UNSPECIFIED: ICD-10-CM

## 2017-12-14 DIAGNOSIS — Z87.891 PERSONAL HISTORY OF NICOTINE DEPENDENCE: ICD-10-CM

## 2017-12-14 DIAGNOSIS — L97.512 NON-PRESSURE CHRONIC ULCER OF OTHER PART OF RIGHT FOOT WITH FAT LAYER EXPOSED: ICD-10-CM

## 2017-12-14 DIAGNOSIS — Z79.01 LONG TERM (CURRENT) USE OF ANTICOAGULANTS: ICD-10-CM

## 2017-12-14 DIAGNOSIS — I34.1 NONRHEUMATIC MITRAL (VALVE) PROLAPSE: ICD-10-CM

## 2017-12-14 DIAGNOSIS — I70.235 ATHEROSCLEROSIS OF NATIVE ARTERIES OF RIGHT LEG WITH ULCERATION OF OTHER PART OF FOOT: ICD-10-CM

## 2017-12-14 LAB — CRP SERPL-MCNC: 0.3 MG/DL — SIGNIFICANT CHANGE UP (ref 0–0.4)

## 2017-12-19 PROCEDURE — 85610 PROTHROMBIN TIME: CPT

## 2017-12-19 PROCEDURE — 96376 TX/PRO/DX INJ SAME DRUG ADON: CPT

## 2017-12-19 PROCEDURE — 73706 CT ANGIO LWR EXTR W/O&W/DYE: CPT

## 2017-12-19 PROCEDURE — P9016: CPT

## 2017-12-19 PROCEDURE — C1887: CPT

## 2017-12-19 PROCEDURE — C1769: CPT

## 2017-12-19 PROCEDURE — 75630 X-RAY AORTA LEG ARTERIES: CPT

## 2017-12-19 PROCEDURE — 82728 ASSAY OF FERRITIN: CPT

## 2017-12-19 PROCEDURE — 96367 TX/PROPH/DG ADDL SEQ IV INF: CPT

## 2017-12-19 PROCEDURE — 80202 ASSAY OF VANCOMYCIN: CPT

## 2017-12-19 PROCEDURE — C1751: CPT

## 2017-12-19 PROCEDURE — 81001 URINALYSIS AUTO W/SCOPE: CPT

## 2017-12-19 PROCEDURE — 86901 BLOOD TYPING SEROLOGIC RH(D): CPT

## 2017-12-19 PROCEDURE — 73660 X-RAY EXAM OF TOE(S): CPT

## 2017-12-19 PROCEDURE — 76937 US GUIDE VASCULAR ACCESS: CPT

## 2017-12-19 PROCEDURE — 36200 PLACE CATHETER IN AORTA: CPT

## 2017-12-19 PROCEDURE — 85027 COMPLETE CBC AUTOMATED: CPT

## 2017-12-19 PROCEDURE — C2617: CPT

## 2017-12-19 PROCEDURE — 88312 SPECIAL STAINS GROUP 1: CPT

## 2017-12-19 PROCEDURE — 83036 HEMOGLOBIN GLYCOSYLATED A1C: CPT

## 2017-12-19 PROCEDURE — 86850 RBC ANTIBODY SCREEN: CPT

## 2017-12-19 PROCEDURE — 74174 CTA ABD&PLVS W/CONTRAST: CPT

## 2017-12-19 PROCEDURE — 80076 HEPATIC FUNCTION PANEL: CPT

## 2017-12-19 PROCEDURE — 80048 BASIC METABOLIC PNL TOTAL CA: CPT

## 2017-12-19 PROCEDURE — 36430 TRANSFUSION BLD/BLD COMPNT: CPT

## 2017-12-19 PROCEDURE — 97162 PT EVAL MOD COMPLEX 30 MIN: CPT

## 2017-12-19 PROCEDURE — 73630 X-RAY EXAM OF FOOT: CPT

## 2017-12-19 PROCEDURE — 86140 C-REACTIVE PROTEIN: CPT

## 2017-12-19 PROCEDURE — 87086 URINE CULTURE/COLONY COUNT: CPT

## 2017-12-19 PROCEDURE — 82607 VITAMIN B-12: CPT

## 2017-12-19 PROCEDURE — 82270 OCCULT BLOOD FECES: CPT

## 2017-12-19 PROCEDURE — 76001: CPT

## 2017-12-19 PROCEDURE — 84100 ASSAY OF PHOSPHORUS: CPT

## 2017-12-19 PROCEDURE — 82746 ASSAY OF FOLIC ACID SERUM: CPT

## 2017-12-19 PROCEDURE — 84132 ASSAY OF SERUM POTASSIUM: CPT

## 2017-12-19 PROCEDURE — 83550 IRON BINDING TEST: CPT

## 2017-12-19 PROCEDURE — 75710 ARTERY X-RAYS ARM/LEG: CPT

## 2017-12-19 PROCEDURE — 97530 THERAPEUTIC ACTIVITIES: CPT

## 2017-12-19 PROCEDURE — 85652 RBC SED RATE AUTOMATED: CPT

## 2017-12-19 PROCEDURE — 93926 LOWER EXTREMITY STUDY: CPT

## 2017-12-19 PROCEDURE — 85018 HEMOGLOBIN: CPT

## 2017-12-19 PROCEDURE — C1889: CPT

## 2017-12-19 PROCEDURE — 76000 FLUOROSCOPY <1 HR PHYS/QHP: CPT

## 2017-12-19 PROCEDURE — 87075 CULTR BACTERIA EXCEPT BLOOD: CPT

## 2017-12-19 PROCEDURE — 88305 TISSUE EXAM BY PATHOLOGIST: CPT

## 2017-12-19 PROCEDURE — 83605 ASSAY OF LACTIC ACID: CPT

## 2017-12-19 PROCEDURE — 99285 EMERGENCY DEPT VISIT HI MDM: CPT | Mod: 25

## 2017-12-19 PROCEDURE — 97161 PT EVAL LOW COMPLEX 20 MIN: CPT

## 2017-12-19 PROCEDURE — 86920 COMPATIBILITY TEST SPIN: CPT

## 2017-12-19 PROCEDURE — 80053 COMPREHEN METABOLIC PANEL: CPT

## 2017-12-19 PROCEDURE — 88311 DECALCIFY TISSUE: CPT

## 2017-12-19 PROCEDURE — C1894: CPT

## 2017-12-19 PROCEDURE — 93005 ELECTROCARDIOGRAM TRACING: CPT

## 2017-12-19 PROCEDURE — 74176 CT ABD & PELVIS W/O CONTRAST: CPT

## 2017-12-19 PROCEDURE — 83735 ASSAY OF MAGNESIUM: CPT

## 2017-12-19 PROCEDURE — 87070 CULTURE OTHR SPECIMN AEROBIC: CPT

## 2017-12-19 PROCEDURE — 86900 BLOOD TYPING SEROLOGIC ABO: CPT

## 2017-12-19 PROCEDURE — 88304 TISSUE EXAM BY PATHOLOGIST: CPT

## 2017-12-19 PROCEDURE — 93306 TTE W/DOPPLER COMPLETE: CPT

## 2017-12-19 PROCEDURE — 87040 BLOOD CULTURE FOR BACTERIA: CPT

## 2017-12-19 PROCEDURE — 97116 GAIT TRAINING THERAPY: CPT

## 2017-12-19 PROCEDURE — 84145 PROCALCITONIN (PCT): CPT

## 2017-12-19 PROCEDURE — 96365 THER/PROPH/DIAG IV INF INIT: CPT | Mod: 59

## 2017-12-19 PROCEDURE — 36140 INTRO NDL ICATH UPR/LXTR ART: CPT

## 2017-12-19 PROCEDURE — 36569 INSJ PICC 5 YR+ W/O IMAGING: CPT

## 2017-12-19 PROCEDURE — 85730 THROMBOPLASTIN TIME PARTIAL: CPT

## 2017-12-19 PROCEDURE — 71045 X-RAY EXAM CHEST 1 VIEW: CPT

## 2017-12-19 PROCEDURE — 87186 SC STD MICRODIL/AGAR DIL: CPT

## 2017-12-20 ENCOUNTER — OUTPATIENT (OUTPATIENT)
Dept: OUTPATIENT SERVICES | Facility: HOSPITAL | Age: 82
LOS: 1 days | Discharge: HOME | End: 2017-12-20
Payer: MEDICARE

## 2017-12-20 DIAGNOSIS — N20.0 CALCULUS OF KIDNEY: Chronic | ICD-10-CM

## 2017-12-20 DIAGNOSIS — Z96.7 PRESENCE OF OTHER BONE AND TENDON IMPLANTS: Chronic | ICD-10-CM

## 2017-12-20 DIAGNOSIS — Z41.9 ENCOUNTER FOR PROCEDURE FOR PURPOSES OTHER THAN REMEDYING HEALTH STATE, UNSPECIFIED: Chronic | ICD-10-CM

## 2017-12-20 DIAGNOSIS — Z98.89 OTHER SPECIFIED POSTPROCEDURAL STATES: Chronic | ICD-10-CM

## 2017-12-20 DIAGNOSIS — Z98.49 CATARACT EXTRACTION STATUS, UNSPECIFIED EYE: Chronic | ICD-10-CM

## 2017-12-20 DIAGNOSIS — I70.235 ATHEROSCLEROSIS OF NATIVE ARTERIES OF RIGHT LEG WITH ULCERATION OF OTHER PART OF FOOT: ICD-10-CM

## 2017-12-20 PROCEDURE — G0463: CPT

## 2017-12-21 DIAGNOSIS — E78.5 HYPERLIPIDEMIA, UNSPECIFIED: ICD-10-CM

## 2017-12-21 DIAGNOSIS — I48.91 UNSPECIFIED ATRIAL FIBRILLATION: ICD-10-CM

## 2017-12-21 DIAGNOSIS — I70.235 ATHEROSCLEROSIS OF NATIVE ARTERIES OF RIGHT LEG WITH ULCERATION OF OTHER PART OF FOOT: ICD-10-CM

## 2017-12-21 DIAGNOSIS — L97.512 NON-PRESSURE CHRONIC ULCER OF OTHER PART OF RIGHT FOOT WITH FAT LAYER EXPOSED: ICD-10-CM

## 2017-12-21 DIAGNOSIS — Z87.891 PERSONAL HISTORY OF NICOTINE DEPENDENCE: ICD-10-CM

## 2017-12-21 DIAGNOSIS — D64.9 ANEMIA, UNSPECIFIED: ICD-10-CM

## 2017-12-21 DIAGNOSIS — Z89.421 ACQUIRED ABSENCE OF OTHER RIGHT TOE(S): ICD-10-CM

## 2017-12-21 DIAGNOSIS — M86.671 OTHER CHRONIC OSTEOMYELITIS, RIGHT ANKLE AND FOOT: ICD-10-CM

## 2017-12-21 DIAGNOSIS — I34.1 NONRHEUMATIC MITRAL (VALVE) PROLAPSE: ICD-10-CM

## 2017-12-21 DIAGNOSIS — K21.9 GASTRO-ESOPHAGEAL REFLUX DISEASE WITHOUT ESOPHAGITIS: ICD-10-CM

## 2017-12-21 DIAGNOSIS — Z79.01 LONG TERM (CURRENT) USE OF ANTICOAGULANTS: ICD-10-CM

## 2017-12-21 DIAGNOSIS — Z82.49 FAMILY HISTORY OF ISCHEMIC HEART DISEASE AND OTHER DISEASES OF THE CIRCULATORY SYSTEM: ICD-10-CM

## 2017-12-21 DIAGNOSIS — Z79.899 OTHER LONG TERM (CURRENT) DRUG THERAPY: ICD-10-CM

## 2017-12-22 ENCOUNTER — OUTPATIENT (OUTPATIENT)
Dept: OUTPATIENT SERVICES | Facility: HOSPITAL | Age: 82
LOS: 1 days | Discharge: HOME | End: 2017-12-22
Payer: COMMERCIAL

## 2017-12-22 DIAGNOSIS — Z98.49 CATARACT EXTRACTION STATUS, UNSPECIFIED EYE: Chronic | ICD-10-CM

## 2017-12-22 DIAGNOSIS — Z41.9 ENCOUNTER FOR PROCEDURE FOR PURPOSES OTHER THAN REMEDYING HEALTH STATE, UNSPECIFIED: Chronic | ICD-10-CM

## 2017-12-22 DIAGNOSIS — Z98.89 OTHER SPECIFIED POSTPROCEDURAL STATES: Chronic | ICD-10-CM

## 2017-12-22 DIAGNOSIS — N20.0 CALCULUS OF KIDNEY: Chronic | ICD-10-CM

## 2017-12-22 DIAGNOSIS — Z96.7 PRESENCE OF OTHER BONE AND TENDON IMPLANTS: Chronic | ICD-10-CM

## 2017-12-22 DIAGNOSIS — I70.235 ATHEROSCLEROSIS OF NATIVE ARTERIES OF RIGHT LEG WITH ULCERATION OF OTHER PART OF FOOT: ICD-10-CM

## 2017-12-22 PROCEDURE — G0277: CPT

## 2017-12-22 PROCEDURE — 82962 GLUCOSE BLOOD TEST: CPT

## 2017-12-23 ENCOUNTER — OUTPATIENT (OUTPATIENT)
Dept: OUTPATIENT SERVICES | Facility: HOSPITAL | Age: 82
LOS: 1 days | Discharge: HOME | End: 2017-12-23
Payer: COMMERCIAL

## 2017-12-23 DIAGNOSIS — L97.512 NON-PRESSURE CHRONIC ULCER OF OTHER PART OF RIGHT FOOT WITH FAT LAYER EXPOSED: ICD-10-CM

## 2017-12-23 DIAGNOSIS — I70.235 ATHEROSCLEROSIS OF NATIVE ARTERIES OF RIGHT LEG WITH ULCERATION OF OTHER PART OF FOOT: ICD-10-CM

## 2017-12-23 DIAGNOSIS — Z98.49 CATARACT EXTRACTION STATUS, UNSPECIFIED EYE: Chronic | ICD-10-CM

## 2017-12-23 DIAGNOSIS — N20.0 CALCULUS OF KIDNEY: Chronic | ICD-10-CM

## 2017-12-23 DIAGNOSIS — Z41.9 ENCOUNTER FOR PROCEDURE FOR PURPOSES OTHER THAN REMEDYING HEALTH STATE, UNSPECIFIED: Chronic | ICD-10-CM

## 2017-12-23 DIAGNOSIS — M86.671 OTHER CHRONIC OSTEOMYELITIS, RIGHT ANKLE AND FOOT: ICD-10-CM

## 2017-12-23 DIAGNOSIS — Z96.7 PRESENCE OF OTHER BONE AND TENDON IMPLANTS: Chronic | ICD-10-CM

## 2017-12-23 DIAGNOSIS — Z98.89 OTHER SPECIFIED POSTPROCEDURAL STATES: Chronic | ICD-10-CM

## 2017-12-23 PROCEDURE — G0277: CPT

## 2017-12-23 PROCEDURE — 82962 GLUCOSE BLOOD TEST: CPT

## 2017-12-26 ENCOUNTER — OUTPATIENT (OUTPATIENT)
Dept: OUTPATIENT SERVICES | Facility: HOSPITAL | Age: 82
LOS: 1 days | Discharge: HOME | End: 2017-12-26
Payer: COMMERCIAL

## 2017-12-26 DIAGNOSIS — Z41.9 ENCOUNTER FOR PROCEDURE FOR PURPOSES OTHER THAN REMEDYING HEALTH STATE, UNSPECIFIED: Chronic | ICD-10-CM

## 2017-12-26 DIAGNOSIS — N20.0 CALCULUS OF KIDNEY: Chronic | ICD-10-CM

## 2017-12-26 DIAGNOSIS — Z96.7 PRESENCE OF OTHER BONE AND TENDON IMPLANTS: Chronic | ICD-10-CM

## 2017-12-26 DIAGNOSIS — Z98.49 CATARACT EXTRACTION STATUS, UNSPECIFIED EYE: Chronic | ICD-10-CM

## 2017-12-26 DIAGNOSIS — I70.235 ATHEROSCLEROSIS OF NATIVE ARTERIES OF RIGHT LEG WITH ULCERATION OF OTHER PART OF FOOT: ICD-10-CM

## 2017-12-26 DIAGNOSIS — Z98.89 OTHER SPECIFIED POSTPROCEDURAL STATES: Chronic | ICD-10-CM

## 2017-12-26 PROCEDURE — G0277: CPT

## 2017-12-26 PROCEDURE — 82962 GLUCOSE BLOOD TEST: CPT

## 2017-12-27 ENCOUNTER — OUTPATIENT (OUTPATIENT)
Dept: OUTPATIENT SERVICES | Facility: HOSPITAL | Age: 82
LOS: 1 days | Discharge: HOME | End: 2017-12-27
Payer: MEDICARE

## 2017-12-27 DIAGNOSIS — M86.671 OTHER CHRONIC OSTEOMYELITIS, RIGHT ANKLE AND FOOT: ICD-10-CM

## 2017-12-27 DIAGNOSIS — N20.0 CALCULUS OF KIDNEY: Chronic | ICD-10-CM

## 2017-12-27 DIAGNOSIS — L97.512 NON-PRESSURE CHRONIC ULCER OF OTHER PART OF RIGHT FOOT WITH FAT LAYER EXPOSED: ICD-10-CM

## 2017-12-27 DIAGNOSIS — Z41.9 ENCOUNTER FOR PROCEDURE FOR PURPOSES OTHER THAN REMEDYING HEALTH STATE, UNSPECIFIED: Chronic | ICD-10-CM

## 2017-12-27 DIAGNOSIS — Z98.49 CATARACT EXTRACTION STATUS, UNSPECIFIED EYE: Chronic | ICD-10-CM

## 2017-12-27 DIAGNOSIS — Z98.89 OTHER SPECIFIED POSTPROCEDURAL STATES: Chronic | ICD-10-CM

## 2017-12-27 DIAGNOSIS — I70.235 ATHEROSCLEROSIS OF NATIVE ARTERIES OF RIGHT LEG WITH ULCERATION OF OTHER PART OF FOOT: ICD-10-CM

## 2017-12-27 DIAGNOSIS — Z96.7 PRESENCE OF OTHER BONE AND TENDON IMPLANTS: Chronic | ICD-10-CM

## 2017-12-27 PROCEDURE — G0277: CPT

## 2017-12-27 PROCEDURE — 82962 GLUCOSE BLOOD TEST: CPT

## 2017-12-28 ENCOUNTER — OUTPATIENT (OUTPATIENT)
Dept: OUTPATIENT SERVICES | Facility: HOSPITAL | Age: 82
LOS: 1 days | Discharge: HOME | End: 2017-12-28
Payer: MEDICARE

## 2017-12-28 DIAGNOSIS — I70.235 ATHEROSCLEROSIS OF NATIVE ARTERIES OF RIGHT LEG WITH ULCERATION OF OTHER PART OF FOOT: ICD-10-CM

## 2017-12-28 DIAGNOSIS — Z41.9 ENCOUNTER FOR PROCEDURE FOR PURPOSES OTHER THAN REMEDYING HEALTH STATE, UNSPECIFIED: Chronic | ICD-10-CM

## 2017-12-28 DIAGNOSIS — Z98.89 OTHER SPECIFIED POSTPROCEDURAL STATES: Chronic | ICD-10-CM

## 2017-12-28 DIAGNOSIS — L97.512 NON-PRESSURE CHRONIC ULCER OF OTHER PART OF RIGHT FOOT WITH FAT LAYER EXPOSED: ICD-10-CM

## 2017-12-28 DIAGNOSIS — Z98.49 CATARACT EXTRACTION STATUS, UNSPECIFIED EYE: Chronic | ICD-10-CM

## 2017-12-28 DIAGNOSIS — M86.671 OTHER CHRONIC OSTEOMYELITIS, RIGHT ANKLE AND FOOT: ICD-10-CM

## 2017-12-28 DIAGNOSIS — Z96.7 PRESENCE OF OTHER BONE AND TENDON IMPLANTS: Chronic | ICD-10-CM

## 2017-12-28 DIAGNOSIS — N20.0 CALCULUS OF KIDNEY: Chronic | ICD-10-CM

## 2017-12-28 PROCEDURE — 82962 GLUCOSE BLOOD TEST: CPT

## 2017-12-28 PROCEDURE — G0277: CPT

## 2017-12-29 ENCOUNTER — OUTPATIENT (OUTPATIENT)
Dept: OUTPATIENT SERVICES | Facility: HOSPITAL | Age: 82
LOS: 1 days | Discharge: HOME | End: 2017-12-29
Payer: MEDICARE

## 2017-12-29 DIAGNOSIS — Z98.89 OTHER SPECIFIED POSTPROCEDURAL STATES: Chronic | ICD-10-CM

## 2017-12-29 DIAGNOSIS — I70.235 ATHEROSCLEROSIS OF NATIVE ARTERIES OF RIGHT LEG WITH ULCERATION OF OTHER PART OF FOOT: ICD-10-CM

## 2017-12-29 DIAGNOSIS — Z96.7 PRESENCE OF OTHER BONE AND TENDON IMPLANTS: Chronic | ICD-10-CM

## 2017-12-29 DIAGNOSIS — N20.0 CALCULUS OF KIDNEY: Chronic | ICD-10-CM

## 2017-12-29 DIAGNOSIS — Z98.49 CATARACT EXTRACTION STATUS, UNSPECIFIED EYE: Chronic | ICD-10-CM

## 2017-12-29 DIAGNOSIS — Z41.9 ENCOUNTER FOR PROCEDURE FOR PURPOSES OTHER THAN REMEDYING HEALTH STATE, UNSPECIFIED: Chronic | ICD-10-CM

## 2017-12-29 PROCEDURE — G0277: CPT

## 2017-12-29 PROCEDURE — 82962 GLUCOSE BLOOD TEST: CPT

## 2017-12-30 DIAGNOSIS — M86.671 OTHER CHRONIC OSTEOMYELITIS, RIGHT ANKLE AND FOOT: ICD-10-CM

## 2017-12-30 DIAGNOSIS — I70.235 ATHEROSCLEROSIS OF NATIVE ARTERIES OF RIGHT LEG WITH ULCERATION OF OTHER PART OF FOOT: ICD-10-CM

## 2017-12-30 DIAGNOSIS — L97.512 NON-PRESSURE CHRONIC ULCER OF OTHER PART OF RIGHT FOOT WITH FAT LAYER EXPOSED: ICD-10-CM

## 2018-01-09 ENCOUNTER — OUTPATIENT (OUTPATIENT)
Dept: OUTPATIENT SERVICES | Facility: HOSPITAL | Age: 83
LOS: 1 days | Discharge: HOME | End: 2018-01-09
Payer: MEDICARE

## 2018-01-09 DIAGNOSIS — Z98.89 OTHER SPECIFIED POSTPROCEDURAL STATES: Chronic | ICD-10-CM

## 2018-01-09 DIAGNOSIS — N20.0 CALCULUS OF KIDNEY: Chronic | ICD-10-CM

## 2018-01-09 DIAGNOSIS — I70.235 ATHEROSCLEROSIS OF NATIVE ARTERIES OF RIGHT LEG WITH ULCERATION OF OTHER PART OF FOOT: ICD-10-CM

## 2018-01-09 DIAGNOSIS — Z98.49 CATARACT EXTRACTION STATUS, UNSPECIFIED EYE: Chronic | ICD-10-CM

## 2018-01-09 DIAGNOSIS — Z41.9 ENCOUNTER FOR PROCEDURE FOR PURPOSES OTHER THAN REMEDYING HEALTH STATE, UNSPECIFIED: Chronic | ICD-10-CM

## 2018-01-09 DIAGNOSIS — Z96.7 PRESENCE OF OTHER BONE AND TENDON IMPLANTS: Chronic | ICD-10-CM

## 2018-01-09 PROCEDURE — 82962 GLUCOSE BLOOD TEST: CPT

## 2018-01-09 PROCEDURE — G0277: CPT

## 2018-01-10 ENCOUNTER — OUTPATIENT (OUTPATIENT)
Dept: OUTPATIENT SERVICES | Facility: HOSPITAL | Age: 83
LOS: 1 days | Discharge: HOME | End: 2018-01-10
Payer: MEDICARE

## 2018-01-10 DIAGNOSIS — Z41.9 ENCOUNTER FOR PROCEDURE FOR PURPOSES OTHER THAN REMEDYING HEALTH STATE, UNSPECIFIED: Chronic | ICD-10-CM

## 2018-01-10 DIAGNOSIS — N20.0 CALCULUS OF KIDNEY: Chronic | ICD-10-CM

## 2018-01-10 DIAGNOSIS — Z96.7 PRESENCE OF OTHER BONE AND TENDON IMPLANTS: Chronic | ICD-10-CM

## 2018-01-10 DIAGNOSIS — I70.235 ATHEROSCLEROSIS OF NATIVE ARTERIES OF RIGHT LEG WITH ULCERATION OF OTHER PART OF FOOT: ICD-10-CM

## 2018-01-10 DIAGNOSIS — Z98.89 OTHER SPECIFIED POSTPROCEDURAL STATES: Chronic | ICD-10-CM

## 2018-01-10 DIAGNOSIS — Z98.49 CATARACT EXTRACTION STATUS, UNSPECIFIED EYE: Chronic | ICD-10-CM

## 2018-01-10 PROCEDURE — G0463: CPT

## 2018-01-10 PROCEDURE — 99213 OFFICE O/P EST LOW 20 MIN: CPT

## 2018-01-11 ENCOUNTER — OUTPATIENT (OUTPATIENT)
Dept: OUTPATIENT SERVICES | Facility: HOSPITAL | Age: 83
LOS: 1 days | Discharge: HOME | End: 2018-01-11
Payer: MEDICARE

## 2018-01-11 DIAGNOSIS — I48.91 UNSPECIFIED ATRIAL FIBRILLATION: ICD-10-CM

## 2018-01-11 DIAGNOSIS — Z89.421 ACQUIRED ABSENCE OF OTHER RIGHT TOE(S): ICD-10-CM

## 2018-01-11 DIAGNOSIS — I70.235 ATHEROSCLEROSIS OF NATIVE ARTERIES OF RIGHT LEG WITH ULCERATION OF OTHER PART OF FOOT: ICD-10-CM

## 2018-01-11 DIAGNOSIS — K21.9 GASTRO-ESOPHAGEAL REFLUX DISEASE WITHOUT ESOPHAGITIS: ICD-10-CM

## 2018-01-11 DIAGNOSIS — M86.671 OTHER CHRONIC OSTEOMYELITIS, RIGHT ANKLE AND FOOT: ICD-10-CM

## 2018-01-11 DIAGNOSIS — Z98.89 OTHER SPECIFIED POSTPROCEDURAL STATES: Chronic | ICD-10-CM

## 2018-01-11 DIAGNOSIS — Z96.7 PRESENCE OF OTHER BONE AND TENDON IMPLANTS: Chronic | ICD-10-CM

## 2018-01-11 DIAGNOSIS — Z41.9 ENCOUNTER FOR PROCEDURE FOR PURPOSES OTHER THAN REMEDYING HEALTH STATE, UNSPECIFIED: Chronic | ICD-10-CM

## 2018-01-11 DIAGNOSIS — Z79.01 LONG TERM (CURRENT) USE OF ANTICOAGULANTS: ICD-10-CM

## 2018-01-11 DIAGNOSIS — D64.9 ANEMIA, UNSPECIFIED: ICD-10-CM

## 2018-01-11 DIAGNOSIS — I34.1 NONRHEUMATIC MITRAL (VALVE) PROLAPSE: ICD-10-CM

## 2018-01-11 DIAGNOSIS — E78.5 HYPERLIPIDEMIA, UNSPECIFIED: ICD-10-CM

## 2018-01-11 DIAGNOSIS — Z87.891 PERSONAL HISTORY OF NICOTINE DEPENDENCE: ICD-10-CM

## 2018-01-11 DIAGNOSIS — Z82.49 FAMILY HISTORY OF ISCHEMIC HEART DISEASE AND OTHER DISEASES OF THE CIRCULATORY SYSTEM: ICD-10-CM

## 2018-01-11 DIAGNOSIS — N20.0 CALCULUS OF KIDNEY: Chronic | ICD-10-CM

## 2018-01-11 DIAGNOSIS — L97.512 NON-PRESSURE CHRONIC ULCER OF OTHER PART OF RIGHT FOOT WITH FAT LAYER EXPOSED: ICD-10-CM

## 2018-01-11 DIAGNOSIS — Z98.49 CATARACT EXTRACTION STATUS, UNSPECIFIED EYE: Chronic | ICD-10-CM

## 2018-01-11 DIAGNOSIS — Z79.899 OTHER LONG TERM (CURRENT) DRUG THERAPY: ICD-10-CM

## 2018-01-11 PROCEDURE — G0277: CPT

## 2018-01-11 PROCEDURE — 82962 GLUCOSE BLOOD TEST: CPT

## 2018-01-12 ENCOUNTER — OUTPATIENT (OUTPATIENT)
Dept: OUTPATIENT SERVICES | Facility: HOSPITAL | Age: 83
LOS: 1 days | Discharge: HOME | End: 2018-01-12
Payer: MEDICARE

## 2018-01-12 DIAGNOSIS — I70.235 ATHEROSCLEROSIS OF NATIVE ARTERIES OF RIGHT LEG WITH ULCERATION OF OTHER PART OF FOOT: ICD-10-CM

## 2018-01-12 DIAGNOSIS — Z41.9 ENCOUNTER FOR PROCEDURE FOR PURPOSES OTHER THAN REMEDYING HEALTH STATE, UNSPECIFIED: Chronic | ICD-10-CM

## 2018-01-12 DIAGNOSIS — Z98.49 CATARACT EXTRACTION STATUS, UNSPECIFIED EYE: Chronic | ICD-10-CM

## 2018-01-12 DIAGNOSIS — N20.0 CALCULUS OF KIDNEY: Chronic | ICD-10-CM

## 2018-01-12 DIAGNOSIS — Z98.89 OTHER SPECIFIED POSTPROCEDURAL STATES: Chronic | ICD-10-CM

## 2018-01-12 DIAGNOSIS — Z96.7 PRESENCE OF OTHER BONE AND TENDON IMPLANTS: Chronic | ICD-10-CM

## 2018-01-12 PROCEDURE — G0277: CPT

## 2018-01-12 PROCEDURE — 82962 GLUCOSE BLOOD TEST: CPT

## 2018-01-12 NOTE — PROGRESS NOTE ADULT - PROBLEM SELECTOR PROBLEM 3
Stroke risk factors include:    Hypertension (high blood pressure)  Diabetes Type 2 (high blood glucose levels)  TIA    Acute blood loss anemia

## 2018-01-13 DIAGNOSIS — I70.235 ATHEROSCLEROSIS OF NATIVE ARTERIES OF RIGHT LEG WITH ULCERATION OF OTHER PART OF FOOT: ICD-10-CM

## 2018-01-13 DIAGNOSIS — M86.671 OTHER CHRONIC OSTEOMYELITIS, RIGHT ANKLE AND FOOT: ICD-10-CM

## 2018-01-13 DIAGNOSIS — L97.512 NON-PRESSURE CHRONIC ULCER OF OTHER PART OF RIGHT FOOT WITH FAT LAYER EXPOSED: ICD-10-CM

## 2018-01-15 ENCOUNTER — OUTPATIENT (OUTPATIENT)
Dept: OUTPATIENT SERVICES | Facility: HOSPITAL | Age: 83
LOS: 1 days | Discharge: HOME | End: 2018-01-15
Payer: MEDICARE

## 2018-01-15 DIAGNOSIS — Z41.9 ENCOUNTER FOR PROCEDURE FOR PURPOSES OTHER THAN REMEDYING HEALTH STATE, UNSPECIFIED: Chronic | ICD-10-CM

## 2018-01-15 DIAGNOSIS — Z98.49 CATARACT EXTRACTION STATUS, UNSPECIFIED EYE: Chronic | ICD-10-CM

## 2018-01-15 DIAGNOSIS — Z96.7 PRESENCE OF OTHER BONE AND TENDON IMPLANTS: Chronic | ICD-10-CM

## 2018-01-15 DIAGNOSIS — I70.235 ATHEROSCLEROSIS OF NATIVE ARTERIES OF RIGHT LEG WITH ULCERATION OF OTHER PART OF FOOT: ICD-10-CM

## 2018-01-15 DIAGNOSIS — M86.671 OTHER CHRONIC OSTEOMYELITIS, RIGHT ANKLE AND FOOT: ICD-10-CM

## 2018-01-15 DIAGNOSIS — L97.512 NON-PRESSURE CHRONIC ULCER OF OTHER PART OF RIGHT FOOT WITH FAT LAYER EXPOSED: ICD-10-CM

## 2018-01-15 DIAGNOSIS — N20.0 CALCULUS OF KIDNEY: Chronic | ICD-10-CM

## 2018-01-15 DIAGNOSIS — Z98.89 OTHER SPECIFIED POSTPROCEDURAL STATES: Chronic | ICD-10-CM

## 2018-01-15 PROCEDURE — G0277: CPT

## 2018-01-15 PROCEDURE — 82962 GLUCOSE BLOOD TEST: CPT

## 2018-01-16 ENCOUNTER — OUTPATIENT (OUTPATIENT)
Dept: OUTPATIENT SERVICES | Facility: HOSPITAL | Age: 83
LOS: 1 days | Discharge: HOME | End: 2018-01-16
Payer: MEDICARE

## 2018-01-16 DIAGNOSIS — I70.235 ATHEROSCLEROSIS OF NATIVE ARTERIES OF RIGHT LEG WITH ULCERATION OF OTHER PART OF FOOT: ICD-10-CM

## 2018-01-16 DIAGNOSIS — M86.671 OTHER CHRONIC OSTEOMYELITIS, RIGHT ANKLE AND FOOT: ICD-10-CM

## 2018-01-16 DIAGNOSIS — Z96.7 PRESENCE OF OTHER BONE AND TENDON IMPLANTS: Chronic | ICD-10-CM

## 2018-01-16 DIAGNOSIS — N20.0 CALCULUS OF KIDNEY: Chronic | ICD-10-CM

## 2018-01-16 DIAGNOSIS — L97.512 NON-PRESSURE CHRONIC ULCER OF OTHER PART OF RIGHT FOOT WITH FAT LAYER EXPOSED: ICD-10-CM

## 2018-01-16 DIAGNOSIS — Z98.89 OTHER SPECIFIED POSTPROCEDURAL STATES: Chronic | ICD-10-CM

## 2018-01-16 DIAGNOSIS — Z41.9 ENCOUNTER FOR PROCEDURE FOR PURPOSES OTHER THAN REMEDYING HEALTH STATE, UNSPECIFIED: Chronic | ICD-10-CM

## 2018-01-16 DIAGNOSIS — Z98.49 CATARACT EXTRACTION STATUS, UNSPECIFIED EYE: Chronic | ICD-10-CM

## 2018-01-16 PROCEDURE — G0277: CPT

## 2018-01-16 PROCEDURE — 82962 GLUCOSE BLOOD TEST: CPT

## 2018-01-17 ENCOUNTER — OUTPATIENT (OUTPATIENT)
Dept: OUTPATIENT SERVICES | Facility: HOSPITAL | Age: 83
LOS: 1 days | Discharge: HOME | End: 2018-01-17
Payer: MEDICARE

## 2018-01-17 DIAGNOSIS — N20.0 CALCULUS OF KIDNEY: Chronic | ICD-10-CM

## 2018-01-17 DIAGNOSIS — Z98.49 CATARACT EXTRACTION STATUS, UNSPECIFIED EYE: Chronic | ICD-10-CM

## 2018-01-17 DIAGNOSIS — Z96.7 PRESENCE OF OTHER BONE AND TENDON IMPLANTS: Chronic | ICD-10-CM

## 2018-01-17 DIAGNOSIS — Z41.9 ENCOUNTER FOR PROCEDURE FOR PURPOSES OTHER THAN REMEDYING HEALTH STATE, UNSPECIFIED: Chronic | ICD-10-CM

## 2018-01-17 DIAGNOSIS — Z98.89 OTHER SPECIFIED POSTPROCEDURAL STATES: Chronic | ICD-10-CM

## 2018-01-17 DIAGNOSIS — I70.235 ATHEROSCLEROSIS OF NATIVE ARTERIES OF RIGHT LEG WITH ULCERATION OF OTHER PART OF FOOT: ICD-10-CM

## 2018-01-17 PROCEDURE — G0277: CPT

## 2018-01-17 PROCEDURE — 82962 GLUCOSE BLOOD TEST: CPT

## 2018-01-18 ENCOUNTER — OUTPATIENT (OUTPATIENT)
Dept: OUTPATIENT SERVICES | Facility: HOSPITAL | Age: 83
LOS: 1 days | Discharge: HOME | End: 2018-01-18
Payer: MEDICARE

## 2018-01-18 DIAGNOSIS — M86.671 OTHER CHRONIC OSTEOMYELITIS, RIGHT ANKLE AND FOOT: ICD-10-CM

## 2018-01-18 DIAGNOSIS — I70.235 ATHEROSCLEROSIS OF NATIVE ARTERIES OF RIGHT LEG WITH ULCERATION OF OTHER PART OF FOOT: ICD-10-CM

## 2018-01-18 DIAGNOSIS — Z98.89 OTHER SPECIFIED POSTPROCEDURAL STATES: Chronic | ICD-10-CM

## 2018-01-18 DIAGNOSIS — Z96.7 PRESENCE OF OTHER BONE AND TENDON IMPLANTS: Chronic | ICD-10-CM

## 2018-01-18 DIAGNOSIS — L97.512 NON-PRESSURE CHRONIC ULCER OF OTHER PART OF RIGHT FOOT WITH FAT LAYER EXPOSED: ICD-10-CM

## 2018-01-18 DIAGNOSIS — Z41.9 ENCOUNTER FOR PROCEDURE FOR PURPOSES OTHER THAN REMEDYING HEALTH STATE, UNSPECIFIED: Chronic | ICD-10-CM

## 2018-01-18 DIAGNOSIS — N20.0 CALCULUS OF KIDNEY: Chronic | ICD-10-CM

## 2018-01-18 DIAGNOSIS — Z98.49 CATARACT EXTRACTION STATUS, UNSPECIFIED EYE: Chronic | ICD-10-CM

## 2018-01-18 PROCEDURE — G0277: CPT

## 2018-01-18 PROCEDURE — 82962 GLUCOSE BLOOD TEST: CPT

## 2018-01-19 ENCOUNTER — OUTPATIENT (OUTPATIENT)
Dept: OUTPATIENT SERVICES | Facility: HOSPITAL | Age: 83
LOS: 1 days | Discharge: HOME | End: 2018-01-19
Payer: MEDICARE

## 2018-01-19 DIAGNOSIS — N20.0 CALCULUS OF KIDNEY: Chronic | ICD-10-CM

## 2018-01-19 DIAGNOSIS — Z98.49 CATARACT EXTRACTION STATUS, UNSPECIFIED EYE: Chronic | ICD-10-CM

## 2018-01-19 DIAGNOSIS — I70.235 ATHEROSCLEROSIS OF NATIVE ARTERIES OF RIGHT LEG WITH ULCERATION OF OTHER PART OF FOOT: ICD-10-CM

## 2018-01-19 DIAGNOSIS — Z96.7 PRESENCE OF OTHER BONE AND TENDON IMPLANTS: Chronic | ICD-10-CM

## 2018-01-19 DIAGNOSIS — Z41.9 ENCOUNTER FOR PROCEDURE FOR PURPOSES OTHER THAN REMEDYING HEALTH STATE, UNSPECIFIED: Chronic | ICD-10-CM

## 2018-01-19 DIAGNOSIS — Z98.89 OTHER SPECIFIED POSTPROCEDURAL STATES: Chronic | ICD-10-CM

## 2018-01-19 PROCEDURE — G0277: CPT

## 2018-01-19 PROCEDURE — 82962 GLUCOSE BLOOD TEST: CPT

## 2018-01-20 DIAGNOSIS — M86.671 OTHER CHRONIC OSTEOMYELITIS, RIGHT ANKLE AND FOOT: ICD-10-CM

## 2018-01-20 DIAGNOSIS — L97.512 NON-PRESSURE CHRONIC ULCER OF OTHER PART OF RIGHT FOOT WITH FAT LAYER EXPOSED: ICD-10-CM

## 2018-01-20 DIAGNOSIS — I70.235 ATHEROSCLEROSIS OF NATIVE ARTERIES OF RIGHT LEG WITH ULCERATION OF OTHER PART OF FOOT: ICD-10-CM

## 2018-01-22 ENCOUNTER — OUTPATIENT (OUTPATIENT)
Dept: OUTPATIENT SERVICES | Facility: HOSPITAL | Age: 83
LOS: 1 days | Discharge: HOME | End: 2018-01-22
Payer: MEDICARE

## 2018-01-22 DIAGNOSIS — N20.0 CALCULUS OF KIDNEY: Chronic | ICD-10-CM

## 2018-01-22 DIAGNOSIS — I70.235 ATHEROSCLEROSIS OF NATIVE ARTERIES OF RIGHT LEG WITH ULCERATION OF OTHER PART OF FOOT: ICD-10-CM

## 2018-01-22 DIAGNOSIS — Z98.49 CATARACT EXTRACTION STATUS, UNSPECIFIED EYE: Chronic | ICD-10-CM

## 2018-01-22 DIAGNOSIS — Z41.9 ENCOUNTER FOR PROCEDURE FOR PURPOSES OTHER THAN REMEDYING HEALTH STATE, UNSPECIFIED: Chronic | ICD-10-CM

## 2018-01-22 DIAGNOSIS — Z98.89 OTHER SPECIFIED POSTPROCEDURAL STATES: Chronic | ICD-10-CM

## 2018-01-22 DIAGNOSIS — Z96.7 PRESENCE OF OTHER BONE AND TENDON IMPLANTS: Chronic | ICD-10-CM

## 2018-01-22 PROCEDURE — G0277: CPT

## 2018-01-22 PROCEDURE — 82962 GLUCOSE BLOOD TEST: CPT

## 2018-01-23 ENCOUNTER — OUTPATIENT (OUTPATIENT)
Dept: OUTPATIENT SERVICES | Facility: HOSPITAL | Age: 83
LOS: 1 days | Discharge: HOME | End: 2018-01-23
Payer: MEDICARE

## 2018-01-23 DIAGNOSIS — I70.235 ATHEROSCLEROSIS OF NATIVE ARTERIES OF RIGHT LEG WITH ULCERATION OF OTHER PART OF FOOT: ICD-10-CM

## 2018-01-23 DIAGNOSIS — Z96.7 PRESENCE OF OTHER BONE AND TENDON IMPLANTS: Chronic | ICD-10-CM

## 2018-01-23 DIAGNOSIS — Z98.49 CATARACT EXTRACTION STATUS, UNSPECIFIED EYE: Chronic | ICD-10-CM

## 2018-01-23 DIAGNOSIS — Z41.9 ENCOUNTER FOR PROCEDURE FOR PURPOSES OTHER THAN REMEDYING HEALTH STATE, UNSPECIFIED: Chronic | ICD-10-CM

## 2018-01-23 DIAGNOSIS — M86.68 OTHER CHRONIC OSTEOMYELITIS, OTHER SITE: ICD-10-CM

## 2018-01-23 DIAGNOSIS — L97.512 NON-PRESSURE CHRONIC ULCER OF OTHER PART OF RIGHT FOOT WITH FAT LAYER EXPOSED: ICD-10-CM

## 2018-01-23 DIAGNOSIS — N20.0 CALCULUS OF KIDNEY: Chronic | ICD-10-CM

## 2018-01-23 DIAGNOSIS — Z98.89 OTHER SPECIFIED POSTPROCEDURAL STATES: Chronic | ICD-10-CM

## 2018-01-23 PROCEDURE — 82962 GLUCOSE BLOOD TEST: CPT

## 2018-01-24 ENCOUNTER — OUTPATIENT (OUTPATIENT)
Dept: OUTPATIENT SERVICES | Facility: HOSPITAL | Age: 83
LOS: 1 days | Discharge: HOME | End: 2018-01-24
Payer: MEDICARE

## 2018-01-24 DIAGNOSIS — Z96.7 PRESENCE OF OTHER BONE AND TENDON IMPLANTS: Chronic | ICD-10-CM

## 2018-01-24 DIAGNOSIS — Z41.9 ENCOUNTER FOR PROCEDURE FOR PURPOSES OTHER THAN REMEDYING HEALTH STATE, UNSPECIFIED: Chronic | ICD-10-CM

## 2018-01-24 DIAGNOSIS — Z98.89 OTHER SPECIFIED POSTPROCEDURAL STATES: Chronic | ICD-10-CM

## 2018-01-24 DIAGNOSIS — I70.235 ATHEROSCLEROSIS OF NATIVE ARTERIES OF RIGHT LEG WITH ULCERATION OF OTHER PART OF FOOT: ICD-10-CM

## 2018-01-24 DIAGNOSIS — N20.0 CALCULUS OF KIDNEY: Chronic | ICD-10-CM

## 2018-01-24 DIAGNOSIS — Z98.49 CATARACT EXTRACTION STATUS, UNSPECIFIED EYE: Chronic | ICD-10-CM

## 2018-01-24 PROCEDURE — 99213 OFFICE O/P EST LOW 20 MIN: CPT

## 2018-01-24 PROCEDURE — G0463: CPT

## 2018-01-25 ENCOUNTER — NON-APPOINTMENT (OUTPATIENT)
Age: 83
End: 2018-01-25

## 2018-01-25 ENCOUNTER — OUTPATIENT (OUTPATIENT)
Dept: OUTPATIENT SERVICES | Facility: HOSPITAL | Age: 83
LOS: 1 days | Discharge: HOME | End: 2018-01-25
Payer: MEDICARE

## 2018-01-25 ENCOUNTER — APPOINTMENT (OUTPATIENT)
Dept: CARDIOLOGY | Facility: CLINIC | Age: 83
End: 2018-01-25
Payer: MEDICARE

## 2018-01-25 VITALS
WEIGHT: 108 LBS | BODY MASS INDEX: 20.39 KG/M2 | DIASTOLIC BLOOD PRESSURE: 102 MMHG | HEART RATE: 78 BPM | SYSTOLIC BLOOD PRESSURE: 181 MMHG | HEIGHT: 61 IN | OXYGEN SATURATION: 95 %

## 2018-01-25 DIAGNOSIS — Z41.9 ENCOUNTER FOR PROCEDURE FOR PURPOSES OTHER THAN REMEDYING HEALTH STATE, UNSPECIFIED: Chronic | ICD-10-CM

## 2018-01-25 DIAGNOSIS — Z98.89 OTHER SPECIFIED POSTPROCEDURAL STATES: Chronic | ICD-10-CM

## 2018-01-25 DIAGNOSIS — Z96.7 PRESENCE OF OTHER BONE AND TENDON IMPLANTS: Chronic | ICD-10-CM

## 2018-01-25 DIAGNOSIS — I70.235 ATHEROSCLEROSIS OF NATIVE ARTERIES OF RIGHT LEG WITH ULCERATION OF OTHER PART OF FOOT: ICD-10-CM

## 2018-01-25 DIAGNOSIS — Z98.49 CATARACT EXTRACTION STATUS, UNSPECIFIED EYE: Chronic | ICD-10-CM

## 2018-01-25 DIAGNOSIS — N20.0 CALCULUS OF KIDNEY: Chronic | ICD-10-CM

## 2018-01-25 PROCEDURE — 99215 OFFICE O/P EST HI 40 MIN: CPT

## 2018-01-25 PROCEDURE — 93000 ELECTROCARDIOGRAM COMPLETE: CPT

## 2018-01-25 PROCEDURE — 99183 HYPERBARIC OXYGEN THERAPY: CPT

## 2018-01-25 PROCEDURE — G0277: CPT

## 2018-01-25 PROCEDURE — 82962 GLUCOSE BLOOD TEST: CPT

## 2018-01-26 ENCOUNTER — OUTPATIENT (OUTPATIENT)
Dept: OUTPATIENT SERVICES | Facility: HOSPITAL | Age: 83
LOS: 1 days | Discharge: HOME | End: 2018-01-26
Payer: MEDICARE

## 2018-01-26 DIAGNOSIS — L97.512 NON-PRESSURE CHRONIC ULCER OF OTHER PART OF RIGHT FOOT WITH FAT LAYER EXPOSED: ICD-10-CM

## 2018-01-26 DIAGNOSIS — M86.671 OTHER CHRONIC OSTEOMYELITIS, RIGHT ANKLE AND FOOT: ICD-10-CM

## 2018-01-26 DIAGNOSIS — Z98.89 OTHER SPECIFIED POSTPROCEDURAL STATES: Chronic | ICD-10-CM

## 2018-01-26 DIAGNOSIS — N20.0 CALCULUS OF KIDNEY: Chronic | ICD-10-CM

## 2018-01-26 DIAGNOSIS — I70.235 ATHEROSCLEROSIS OF NATIVE ARTERIES OF RIGHT LEG WITH ULCERATION OF OTHER PART OF FOOT: ICD-10-CM

## 2018-01-26 DIAGNOSIS — Z41.9 ENCOUNTER FOR PROCEDURE FOR PURPOSES OTHER THAN REMEDYING HEALTH STATE, UNSPECIFIED: Chronic | ICD-10-CM

## 2018-01-26 DIAGNOSIS — Z98.49 CATARACT EXTRACTION STATUS, UNSPECIFIED EYE: Chronic | ICD-10-CM

## 2018-01-26 DIAGNOSIS — Z96.7 PRESENCE OF OTHER BONE AND TENDON IMPLANTS: Chronic | ICD-10-CM

## 2018-01-26 PROCEDURE — 99183 HYPERBARIC OXYGEN THERAPY: CPT

## 2018-01-26 PROCEDURE — G0277: CPT

## 2018-01-26 PROCEDURE — 82962 GLUCOSE BLOOD TEST: CPT

## 2018-01-29 ENCOUNTER — APPOINTMENT (OUTPATIENT)
Dept: CARDIOLOGY | Facility: CLINIC | Age: 83
End: 2018-01-29
Payer: MEDICARE

## 2018-01-29 VITALS
BODY MASS INDEX: 20.39 KG/M2 | DIASTOLIC BLOOD PRESSURE: 85 MMHG | HEART RATE: 74 BPM | HEIGHT: 61 IN | OXYGEN SATURATION: 94 % | SYSTOLIC BLOOD PRESSURE: 165 MMHG | WEIGHT: 108 LBS

## 2018-01-29 LAB
INR PPP: 1.5 RATIO
QUALITY CONTROL: YES

## 2018-01-29 PROCEDURE — 85610 PROTHROMBIN TIME: CPT | Mod: QW

## 2018-01-29 PROCEDURE — 99215 OFFICE O/P EST HI 40 MIN: CPT

## 2018-01-30 VITALS — HEIGHT: 61 IN | BODY MASS INDEX: 20.39 KG/M2 | HEART RATE: 74 BPM | WEIGHT: 108 LBS

## 2018-02-04 ENCOUNTER — INPATIENT (INPATIENT)
Facility: HOSPITAL | Age: 83
LOS: 7 days | Discharge: NURSING, REHAB OR LONG-TERM CARE | DRG: 260 | End: 2018-02-12
Attending: HOSPITALIST | Admitting: HOSPITALIST
Payer: MEDICARE

## 2018-02-04 VITALS
OXYGEN SATURATION: 90 % | DIASTOLIC BLOOD PRESSURE: 46 MMHG | HEART RATE: 42 BPM | RESPIRATION RATE: 16 BRPM | SYSTOLIC BLOOD PRESSURE: 76 MMHG | TEMPERATURE: 97 F

## 2018-02-04 DIAGNOSIS — K52.9 NONINFECTIVE GASTROENTERITIS AND COLITIS, UNSPECIFIED: ICD-10-CM

## 2018-02-04 DIAGNOSIS — Z29.9 ENCOUNTER FOR PROPHYLACTIC MEASURES, UNSPECIFIED: ICD-10-CM

## 2018-02-04 DIAGNOSIS — I10 ESSENTIAL (PRIMARY) HYPERTENSION: ICD-10-CM

## 2018-02-04 DIAGNOSIS — I73.9 PERIPHERAL VASCULAR DISEASE, UNSPECIFIED: ICD-10-CM

## 2018-02-04 DIAGNOSIS — Z41.9 ENCOUNTER FOR PROCEDURE FOR PURPOSES OTHER THAN REMEDYING HEALTH STATE, UNSPECIFIED: Chronic | ICD-10-CM

## 2018-02-04 DIAGNOSIS — E78.5 HYPERLIPIDEMIA, UNSPECIFIED: ICD-10-CM

## 2018-02-04 DIAGNOSIS — I48.91 UNSPECIFIED ATRIAL FIBRILLATION: ICD-10-CM

## 2018-02-04 DIAGNOSIS — R57.9 SHOCK, UNSPECIFIED: ICD-10-CM

## 2018-02-04 DIAGNOSIS — I63.9 CEREBRAL INFARCTION, UNSPECIFIED: ICD-10-CM

## 2018-02-04 DIAGNOSIS — Z96.7 PRESENCE OF OTHER BONE AND TENDON IMPLANTS: Chronic | ICD-10-CM

## 2018-02-04 DIAGNOSIS — E87.2 ACIDOSIS: ICD-10-CM

## 2018-02-04 DIAGNOSIS — Z98.49 CATARACT EXTRACTION STATUS, UNSPECIFIED EYE: Chronic | ICD-10-CM

## 2018-02-04 DIAGNOSIS — Z98.89 OTHER SPECIFIED POSTPROCEDURAL STATES: Chronic | ICD-10-CM

## 2018-02-04 DIAGNOSIS — I95.9 HYPOTENSION, UNSPECIFIED: ICD-10-CM

## 2018-02-04 DIAGNOSIS — R00.1 BRADYCARDIA, UNSPECIFIED: ICD-10-CM

## 2018-02-04 DIAGNOSIS — G62.9 POLYNEUROPATHY, UNSPECIFIED: ICD-10-CM

## 2018-02-04 DIAGNOSIS — N20.0 CALCULUS OF KIDNEY: Chronic | ICD-10-CM

## 2018-02-04 LAB
ALBUMIN SERPL ELPH-MCNC: 2.1 G/DL — LOW (ref 3.3–5)
ALP SERPL-CCNC: 74 U/L — SIGNIFICANT CHANGE UP (ref 40–120)
ALT FLD-CCNC: 64 U/L — SIGNIFICANT CHANGE UP (ref 12–78)
ANION GAP SERPL CALC-SCNC: 11 MMOL/L — SIGNIFICANT CHANGE UP (ref 5–17)
ANION GAP SERPL CALC-SCNC: 9 MMOL/L — SIGNIFICANT CHANGE UP (ref 5–17)
APTT BLD: 29.2 SEC — SIGNIFICANT CHANGE UP (ref 27.5–37.4)
AST SERPL-CCNC: 80 U/L — HIGH (ref 15–37)
BASE EXCESS BLDA CALC-SCNC: -10.2 MMOL/L — LOW (ref -2–2)
BASE EXCESS BLDA CALC-SCNC: -14.6 MMOL/L — LOW (ref -2–2)
BASOPHILS # BLD AUTO: 0.1 K/UL — SIGNIFICANT CHANGE UP (ref 0–0.2)
BASOPHILS NFR BLD AUTO: 0.7 % — SIGNIFICANT CHANGE UP (ref 0–2)
BILIRUB SERPL-MCNC: 0.4 MG/DL — SIGNIFICANT CHANGE UP (ref 0.2–1.2)
BLD GP AB SCN SERPL QL: SIGNIFICANT CHANGE UP
BLOOD GAS COMMENTS ARTERIAL: SIGNIFICANT CHANGE UP
BUN SERPL-MCNC: 24 MG/DL — HIGH (ref 7–23)
BUN SERPL-MCNC: 28 MG/DL — HIGH (ref 7–23)
CALCIUM SERPL-MCNC: 6.7 MG/DL — LOW (ref 8.5–10.1)
CALCIUM SERPL-MCNC: 8.4 MG/DL — LOW (ref 8.5–10.1)
CHLORIDE SERPL-SCNC: 107 MMOL/L — SIGNIFICANT CHANGE UP (ref 96–108)
CHLORIDE SERPL-SCNC: 117 MMOL/L — HIGH (ref 96–108)
CK MB BLD-MCNC: 3.3 % — SIGNIFICANT CHANGE UP (ref 0–3.5)
CK MB CFR SERPL CALC: 1.7 NG/ML — SIGNIFICANT CHANGE UP (ref 0–3.6)
CK SERPL-CCNC: 51 U/L — SIGNIFICANT CHANGE UP (ref 26–192)
CO2 SERPL-SCNC: 17 MMOL/L — LOW (ref 22–31)
CO2 SERPL-SCNC: 24 MMOL/L — SIGNIFICANT CHANGE UP (ref 22–31)
CREAT SERPL-MCNC: 1.2 MG/DL — SIGNIFICANT CHANGE UP (ref 0.5–1.3)
CREAT SERPL-MCNC: 1.6 MG/DL — HIGH (ref 0.5–1.3)
EOSINOPHIL # BLD AUTO: 0.4 K/UL — SIGNIFICANT CHANGE UP (ref 0–0.5)
EOSINOPHIL NFR BLD AUTO: 3.5 % — SIGNIFICANT CHANGE UP (ref 0–6)
GLUCOSE SERPL-MCNC: 106 MG/DL — HIGH (ref 70–99)
GLUCOSE SERPL-MCNC: 122 MG/DL — HIGH (ref 70–99)
HCO3 BLDA-SCNC: 13 MMOL/L — LOW (ref 23–27)
HCO3 BLDA-SCNC: 16 MMOL/L — LOW (ref 23–27)
HCT VFR BLD CALC: 32 % — LOW (ref 34.5–45)
HGB BLD-MCNC: 9.9 G/DL — LOW (ref 11.5–15.5)
HOROWITZ INDEX BLDA+IHG-RTO: 100 — SIGNIFICANT CHANGE UP
HOROWITZ INDEX BLDA+IHG-RTO: 40 — SIGNIFICANT CHANGE UP
INR BLD: 1.06 RATIO — SIGNIFICANT CHANGE UP (ref 0.88–1.16)
LACTATE SERPL-SCNC: 1.6 MMOL/L — SIGNIFICANT CHANGE UP (ref 0.7–2)
LYMPHOCYTES # BLD AUTO: 1.4 K/UL — SIGNIFICANT CHANGE UP (ref 1–3.3)
LYMPHOCYTES # BLD AUTO: 10.8 % — LOW (ref 13–44)
MCHC RBC-ENTMCNC: 28 PG — SIGNIFICANT CHANGE UP (ref 27–34)
MCHC RBC-ENTMCNC: 30.9 GM/DL — LOW (ref 32–36)
MCV RBC AUTO: 90.8 FL — SIGNIFICANT CHANGE UP (ref 80–100)
MONOCYTES # BLD AUTO: 0.8 K/UL — SIGNIFICANT CHANGE UP (ref 0–0.9)
MONOCYTES NFR BLD AUTO: 6.7 % — SIGNIFICANT CHANGE UP (ref 1–9)
NEUTROPHILS # BLD AUTO: 9.8 K/UL — HIGH (ref 1.8–7.4)
NEUTROPHILS NFR BLD AUTO: 78.3 % — HIGH (ref 43–77)
OB PNL STL: NEGATIVE — SIGNIFICANT CHANGE UP
PCO2 BLDA: 37 MMHG — SIGNIFICANT CHANGE UP (ref 32–46)
PCO2 BLDA: 39 MMHG — SIGNIFICANT CHANGE UP (ref 32–46)
PH BLDA: 7.14 — CRITICAL LOW (ref 7.35–7.45)
PH BLDA: 7.25 — LOW (ref 7.35–7.45)
PLATELET # BLD AUTO: 260 K/UL — SIGNIFICANT CHANGE UP (ref 150–400)
PO2 BLDA: 134 MMHG — HIGH (ref 74–108)
PO2 BLDA: 67 MMHG — LOW (ref 74–108)
POTASSIUM SERPL-MCNC: 3.7 MMOL/L — SIGNIFICANT CHANGE UP (ref 3.5–5.3)
POTASSIUM SERPL-MCNC: 3.9 MMOL/L — SIGNIFICANT CHANGE UP (ref 3.5–5.3)
POTASSIUM SERPL-SCNC: 3.7 MMOL/L — SIGNIFICANT CHANGE UP (ref 3.5–5.3)
POTASSIUM SERPL-SCNC: 3.9 MMOL/L — SIGNIFICANT CHANGE UP (ref 3.5–5.3)
PROT SERPL-MCNC: 4.7 G/DL — LOW (ref 6–8.3)
PROTHROM AB SERPL-ACNC: 11.6 SEC — SIGNIFICANT CHANGE UP (ref 9.8–12.7)
RAPID RVP RESULT: SIGNIFICANT CHANGE UP
RBC # BLD: 3.52 M/UL — LOW (ref 3.8–5.2)
RBC # FLD: 15.6 % — HIGH (ref 10.3–14.5)
SAO2 % BLDA: 89 % — LOW (ref 92–96)
SAO2 % BLDA: 99 % — HIGH (ref 92–96)
SODIUM SERPL-SCNC: 140 MMOL/L — SIGNIFICANT CHANGE UP (ref 135–145)
SODIUM SERPL-SCNC: 145 MMOL/L — SIGNIFICANT CHANGE UP (ref 135–145)
TROPONIN I SERPL-MCNC: <.015 NG/ML — SIGNIFICANT CHANGE UP (ref 0.01–0.04)
WBC # BLD: 12.5 K/UL — HIGH (ref 3.8–10.5)
WBC # FLD AUTO: 12.5 K/UL — HIGH (ref 3.8–10.5)

## 2018-02-04 PROCEDURE — 99291 CRITICAL CARE FIRST HOUR: CPT

## 2018-02-04 PROCEDURE — 71045 X-RAY EXAM CHEST 1 VIEW: CPT | Mod: 26

## 2018-02-04 PROCEDURE — 99223 1ST HOSP IP/OBS HIGH 75: CPT | Mod: AI,GC

## 2018-02-04 PROCEDURE — 93010 ELECTROCARDIOGRAM REPORT: CPT

## 2018-02-04 RX ORDER — NOREPINEPHRINE BITARTRATE/D5W 8 MG/250ML
1 PLASTIC BAG, INJECTION (ML) INTRAVENOUS
Qty: 16 | Refills: 0 | Status: DISCONTINUED | OUTPATIENT
Start: 2018-02-04 | End: 2018-02-04

## 2018-02-04 RX ORDER — FENTANYL CITRATE 50 UG/ML
0.25 INJECTION INTRAVENOUS
Qty: 2500 | Refills: 0 | Status: DISCONTINUED | OUTPATIENT
Start: 2018-02-04 | End: 2018-02-04

## 2018-02-04 RX ORDER — DOPAMINE HYDROCHLORIDE 40 MG/ML
2 INJECTION, SOLUTION, CONCENTRATE INTRAVENOUS
Qty: 400 | Refills: 0 | Status: DISCONTINUED | OUTPATIENT
Start: 2018-02-04 | End: 2018-02-04

## 2018-02-04 RX ORDER — PIPERACILLIN AND TAZOBACTAM 4; .5 G/20ML; G/20ML
3.38 INJECTION, POWDER, LYOPHILIZED, FOR SOLUTION INTRAVENOUS EVERY 12 HOURS
Qty: 0 | Refills: 0 | Status: DISCONTINUED | OUTPATIENT
Start: 2018-02-05 | End: 2018-02-07

## 2018-02-04 RX ORDER — DEXMEDETOMIDINE HYDROCHLORIDE IN 0.9% SODIUM CHLORIDE 4 UG/ML
0.1 INJECTION INTRAVENOUS
Qty: 200 | Refills: 0 | Status: DISCONTINUED | OUTPATIENT
Start: 2018-02-04 | End: 2018-02-04

## 2018-02-04 RX ORDER — ONDANSETRON 8 MG/1
4 TABLET, FILM COATED ORAL ONCE
Qty: 0 | Refills: 0 | Status: COMPLETED | OUTPATIENT
Start: 2018-02-04 | End: 2018-02-04

## 2018-02-04 RX ORDER — IOHEXOL 300 MG/ML
30 INJECTION, SOLUTION INTRAVENOUS ONCE
Qty: 0 | Refills: 0 | Status: COMPLETED | OUTPATIENT
Start: 2018-02-04 | End: 2018-02-04

## 2018-02-04 RX ORDER — CHLORHEXIDINE GLUCONATE 213 G/1000ML
15 SOLUTION TOPICAL
Qty: 0 | Refills: 0 | Status: DISCONTINUED | OUTPATIENT
Start: 2018-02-04 | End: 2018-02-07

## 2018-02-04 RX ORDER — SODIUM CHLORIDE 9 MG/ML
3 INJECTION INTRAMUSCULAR; INTRAVENOUS; SUBCUTANEOUS ONCE
Qty: 0 | Refills: 0 | Status: COMPLETED | OUTPATIENT
Start: 2018-02-04 | End: 2018-02-04

## 2018-02-04 RX ORDER — VANCOMYCIN HCL 1 G
250 VIAL (EA) INTRAVENOUS EVERY 6 HOURS
Qty: 0 | Refills: 0 | Status: DISCONTINUED | OUTPATIENT
Start: 2018-02-04 | End: 2018-02-04

## 2018-02-04 RX ORDER — PIPERACILLIN AND TAZOBACTAM 4; .5 G/20ML; G/20ML
3.38 INJECTION, POWDER, LYOPHILIZED, FOR SOLUTION INTRAVENOUS ONCE
Qty: 0 | Refills: 0 | Status: COMPLETED | OUTPATIENT
Start: 2018-02-04 | End: 2018-02-04

## 2018-02-04 RX ORDER — HYDROCORTISONE 20 MG
50 TABLET ORAL EVERY 6 HOURS
Qty: 0 | Refills: 0 | Status: DISCONTINUED | OUTPATIENT
Start: 2018-02-04 | End: 2018-02-06

## 2018-02-04 RX ORDER — CALCIUM GLUCONATE 100 MG/ML
1 VIAL (ML) INTRAVENOUS ONCE
Qty: 0 | Refills: 0 | Status: COMPLETED | OUTPATIENT
Start: 2018-02-04 | End: 2018-02-04

## 2018-02-04 RX ORDER — DEXTROSE 50 % IN WATER 50 %
50 SYRINGE (ML) INTRAVENOUS ONCE
Qty: 0 | Refills: 0 | Status: COMPLETED | OUTPATIENT
Start: 2018-02-04 | End: 2018-02-04

## 2018-02-04 RX ORDER — SODIUM BICARBONATE 1 MEQ/ML
50 SYRINGE (ML) INTRAVENOUS ONCE
Qty: 0 | Refills: 0 | Status: COMPLETED | OUTPATIENT
Start: 2018-02-04 | End: 2018-02-04

## 2018-02-04 RX ORDER — FENTANYL CITRATE 50 UG/ML
50 INJECTION INTRAVENOUS
Qty: 0 | Refills: 0 | Status: DISCONTINUED | OUTPATIENT
Start: 2018-02-04 | End: 2018-02-06

## 2018-02-04 RX ORDER — SODIUM CHLORIDE 9 MG/ML
1000 INJECTION INTRAMUSCULAR; INTRAVENOUS; SUBCUTANEOUS ONCE
Qty: 0 | Refills: 0 | Status: COMPLETED | OUTPATIENT
Start: 2018-02-04 | End: 2018-02-04

## 2018-02-04 RX ORDER — FENTANYL CITRATE 50 UG/ML
0.25 INJECTION INTRAVENOUS
Qty: 5000 | Refills: 0 | Status: DISCONTINUED | OUTPATIENT
Start: 2018-02-04 | End: 2018-02-04

## 2018-02-04 RX ORDER — SODIUM BICARBONATE 1 MEQ/ML
0.31 SYRINGE (ML) INTRAVENOUS
Qty: 150 | Refills: 0 | Status: DISCONTINUED | OUTPATIENT
Start: 2018-02-04 | End: 2018-02-05

## 2018-02-04 RX ORDER — FENTANYL CITRATE 50 UG/ML
50 INJECTION INTRAVENOUS ONCE
Qty: 0 | Refills: 0 | Status: DISCONTINUED | OUTPATIENT
Start: 2018-02-04 | End: 2018-02-04

## 2018-02-04 RX ORDER — INSULIN LISPRO 100/ML
6 VIAL (ML) SUBCUTANEOUS ONCE
Qty: 0 | Refills: 0 | Status: COMPLETED | OUTPATIENT
Start: 2018-02-04 | End: 2018-02-04

## 2018-02-04 RX ORDER — VANCOMYCIN HCL 1 G
125 VIAL (EA) INTRAVENOUS EVERY 6 HOURS
Qty: 0 | Refills: 0 | Status: DISCONTINUED | OUTPATIENT
Start: 2018-02-04 | End: 2018-02-05

## 2018-02-04 RX ORDER — PANTOPRAZOLE SODIUM 20 MG/1
40 TABLET, DELAYED RELEASE ORAL DAILY
Qty: 0 | Refills: 0 | Status: DISCONTINUED | OUTPATIENT
Start: 2018-02-04 | End: 2018-02-08

## 2018-02-04 RX ORDER — HEPARIN SODIUM 5000 [USP'U]/ML
5000 INJECTION INTRAVENOUS; SUBCUTANEOUS EVERY 12 HOURS
Qty: 0 | Refills: 0 | Status: DISCONTINUED | OUTPATIENT
Start: 2018-02-04 | End: 2018-02-05

## 2018-02-04 RX ORDER — SODIUM CHLORIDE 9 MG/ML
500 INJECTION INTRAMUSCULAR; INTRAVENOUS; SUBCUTANEOUS
Qty: 0 | Refills: 0 | Status: DISCONTINUED | OUTPATIENT
Start: 2018-02-04 | End: 2018-02-04

## 2018-02-04 RX ORDER — GLUCAGON INJECTION, SOLUTION 0.5 MG/.1ML
1 INJECTION, SOLUTION SUBCUTANEOUS ONCE
Qty: 0 | Refills: 0 | Status: COMPLETED | OUTPATIENT
Start: 2018-02-04 | End: 2018-02-04

## 2018-02-04 RX ORDER — NOREPINEPHRINE BITARTRATE/D5W 8 MG/250ML
1 PLASTIC BAG, INJECTION (ML) INTRAVENOUS
Qty: 8 | Refills: 0 | Status: DISCONTINUED | OUTPATIENT
Start: 2018-02-04 | End: 2018-02-05

## 2018-02-04 RX ADMIN — Medication 92.04 MICROGRAM(S)/KG/MIN: at 23:50

## 2018-02-04 RX ADMIN — Medication 1 MILLIGRAM(S): at 22:50

## 2018-02-04 RX ADMIN — Medication 92.04 MICROGRAM(S)/KG/MIN: at 21:35

## 2018-02-04 RX ADMIN — Medication 200 GRAM(S): at 19:21

## 2018-02-04 RX ADMIN — Medication 1 MILLIGRAM(S): at 20:03

## 2018-02-04 RX ADMIN — Medication 50 MILLIEQUIVALENT(S): at 22:24

## 2018-02-04 RX ADMIN — Medication 1 MILLIGRAM(S): at 20:20

## 2018-02-04 RX ADMIN — SODIUM CHLORIDE 3 MILLILITER(S): 9 INJECTION INTRAMUSCULAR; INTRAVENOUS; SUBCUTANEOUS at 18:35

## 2018-02-04 RX ADMIN — SODIUM CHLORIDE 1000 MILLILITER(S): 9 INJECTION INTRAMUSCULAR; INTRAVENOUS; SUBCUTANEOUS at 19:28

## 2018-02-04 RX ADMIN — ONDANSETRON 4 MILLIGRAM(S): 8 TABLET, FILM COATED ORAL at 19:28

## 2018-02-04 RX ADMIN — Medication 50 MILLILITER(S): at 19:36

## 2018-02-04 RX ADMIN — FENTANYL CITRATE 50 MICROGRAM(S): 50 INJECTION INTRAVENOUS at 21:04

## 2018-02-04 RX ADMIN — PIPERACILLIN AND TAZOBACTAM 200 GRAM(S): 4; .5 INJECTION, POWDER, LYOPHILIZED, FOR SOLUTION INTRAVENOUS at 20:38

## 2018-02-04 RX ADMIN — Medication 100 MEQ/KG/HR: at 23:50

## 2018-02-04 RX ADMIN — Medication 50 MILLIGRAM(S): at 23:20

## 2018-02-04 RX ADMIN — Medication 6 UNIT(S): at 19:36

## 2018-02-04 RX ADMIN — DOPAMINE HYDROCHLORIDE 3.68 MICROGRAM(S)/KG/MIN: 40 INJECTION, SOLUTION, CONCENTRATE INTRAVENOUS at 19:15

## 2018-02-04 RX ADMIN — GLUCAGON INJECTION, SOLUTION 1 MILLIGRAM(S): 0.5 INJECTION, SOLUTION SUBCUTANEOUS at 19:22

## 2018-02-04 RX ADMIN — FENTANYL CITRATE 50 MICROGRAM(S): 50 INJECTION INTRAVENOUS at 20:33

## 2018-02-04 NOTE — ED ADULT NURSE REASSESSMENT NOTE - NS ED NURSE REASSESS COMMENT FT1
Pt assess by Dr. Potts pacer pads placed on pt. Cardiac monitoring ongoing, supplemental O2 given by NC 2 lpm. IV inserted, fluids running.

## 2018-02-04 NOTE — H&P ADULT - PROBLEM SELECTOR PLAN 5
1. Continue ASA, statin rate controlled, currently bradycardic now s/p transcutaneous pacemaker   1. Continue coumadin as per ICU   2. Hold antiarrhythmics in setting of bradycardia

## 2018-02-04 NOTE — H&P ADULT - PROBLEM SELECTOR PLAN 10
IMPROVE VTE Individual Risk Assessment          RISK                                                          Points  [  ] Previous VTE                                                3  [  ] Thrombophilia                                             2  [  ] Lower limb paralysis                                   2        (unable to hold up >15 seconds)    [  ] Current Cancer                                             2         (within 6 months)  [  ] Immobilization > 24 hrs                              1  [  ] ICU/CCU stay > 24 hours                             1  [ x ] Age > 60                                                         1    IMPROVE VTE Score: 1 IMPROVE VTE Individual Risk Assessment          RISK                                                          Points  [  ] Previous VTE                                                3  [  ] Thrombophilia                                             2  [  ] Lower limb paralysis                                   2        (unable to hold up >15 seconds)    [  ] Current Cancer                                             2         (within 6 months)  [  ] Immobilization > 24 hrs                              1  [  ] ICU/CCU stay > 24 hours                             1  [ x ] Age > 60                                                         1    IMPROVE VTE Score: 1    1. Heparin 5000u q12h dvt ppx   2. NPO   3. Intubated

## 2018-02-04 NOTE — H&P ADULT - ASSESSMENT
82F PMHx Afib on Coumadin, HLD, CVA (1990's), MVP, OA, HTN, RA, PVD (s/p RLE bypassx3, most recently 2012), Neuropathy, hx OM BIBEMS from home with cc of diarrhea, admitted for severe bradycardia and hypotension, s/p transcutaneous PPM placement in ED. 82F PMHx Afib on Coumadin, HLD, CVA (1990's), MVP, OA, HTN, RA, PVD (s/p RLE bypassx3, most recently 2012), Neuropathy, hx OM BIBEMS from home with cc of diarrhea, admitted for severe bradycardia and hypotension, s/p transcutaneous pacemaker placement in ED. 82F PMHx Afib on Coumadin, HLD, CVA (1990's), MVP, OA, HTN, RA, PVD (s/p RLE bypassx3, most recently 2012), Neuropathy, hx OM BIBEMS from home with cc of diarrhea, severe bradycardia and hypotension, s/p transcutaneous pacemaker placement in ED, admitted with likely hypovolumic shock.

## 2018-02-04 NOTE — H&P ADULT - PROBLEM SELECTOR PLAN 2
possibly hypovolumic in setting of infectious/viral process  1. Continue Levophed and Dopamine gtt, titrate as per ICU   2. Vitals per routine  3. F/u RVP   4. F/u urine/blood cultures s/p transcutaneous pacemaker in ED; unclear etiology  1. Admit to ICU for closer monitoring s/p transcutaneous pacemaker in ED; unclear etiology- possibly secondary to recent addition of diuretic and increase in antihypertensive medication   1. transcutaneous pacemaker in place  2. Hold BB and CCB in setting of bradycardia and hypotension   3. Consult cardio: Dr. Laughlin

## 2018-02-04 NOTE — H&P ADULT - PROBLEM SELECTOR PLAN 4
rate controlled, currently bradycardic now s/p transcutaneous pacemaker   1. Continue coumadin   2. Hold antiarrhythmics in setting of bradycardia Non-anion gap, likely secondary to diarrhea  1. Continue Sodium bicarb gtt  2. Repeat ABG as per ICU

## 2018-02-04 NOTE — ED ADULT NURSE REASSESSMENT NOTE - NS ED NURSE REASSESS COMMENT FT1
1930 Pt became nauseous, pasty, nonverbal, tremors of the upper extremities, clinching her fists, snoring briefly. Dr. Potts at bedside, respiratory at bedside. Pt intubated 1930 Pt became nauseous, pasty, nonverbal, tremors of the upper extremities, clinching her fists, snoring briefly. Dr. Potts at bedside, respiratory at bedside. MD administered Succ 100mg, Etomidate  20 mg prior to being intubated with a # 7.5, lip mela @ 24 Pt on vent with the settings of , PEEP 5, rate 16, FI02 @ 100%.  TYLER Zamorano at bedside initiating the insertion of transvenous pacer @ 1947. 2055: #16 fr chaudhary inserted. No urinary output noted TYLER Zamorano aware.

## 2018-02-04 NOTE — PROVIDER CONTACT NOTE (EICU) - ASSESSMENT
Pt requires PPI prophylaxis, sedation and pain management with pressor requirement in the setting of new intubation.

## 2018-02-04 NOTE — CONSULT NOTE ADULT - SUBJECTIVE AND OBJECTIVE BOX
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  Osteoarthritis  Afib  PVD (peripheral vascular disease)  Neuropathy: (Right lower leg)  H/O cerebral aneurysm repair: brain clips  CVA (cerebral vascular accident): (&quot;Mini-stroke&quot;,1990&#x27;s)  Rheumatoid arthritis  Hyperlipidemia  Hypertension  S/P cataract surgery: (Left eye)  Elective surgery: (&quot;Twisted bowel&quot;, 2014)  Elective surgery: (Exision of cyst on liver, 1985)  S/P ORIF (open reduction internal fixation) fracture: Left hip fx (2012) &amp; R hip fx (2013)  H/O cerebral aneurysm repair: Brain clips (1978(  Renal stone: Cysto stent placement 10/1/2014  PVD (peripheral vascular disease): s/p RLE bypass x 3, most recent 3/2012 Right external iliac to PT bypass w/ PTFE (2012)  S/P FRED (total abdominal hysterectomy): (1987, Hx of &quot;ovarian cancer?&quot;)    CARITO CONSTANTINO   82y    Female    BRIEF HOSPITAL COURSE:    Review of Systems:                       All other ROS are negative.    ICU Vital Signs Last 24 Hrs  T(C): 34.2 (04 Feb 2018 22:00), Max: 36.8 (04 Feb 2018 18:12)  T(F): 93.6 (04 Feb 2018 22:00), Max: 98.3 (04 Feb 2018 18:12)  HR: 63 (04 Feb 2018 23:27) (32 - 84)  BP: 96/57 (04 Feb 2018 23:00) (50/30 - 124/62)  BP(mean): 69 (04 Feb 2018 23:00) (69 - 83)  ABP: --  ABP(mean): --  RR: 16 (04 Feb 2018 23:00) (14 - 16)  SpO2: 93% (04 Feb 2018 23:27) (90% - 100%)    Physical Examination:    General:     HEENT:     PULM:     CVS:     ABD:     EXT:     SKIN:     Neuro:    ABG - ( 04 Feb 2018 23:23 )  pH: 7.25  /  pCO2: 37    /  pO2: 67    / HCO3: 16    / Base Excess: -10.2 /  SaO2: 89                Mode: AC/ CMV (Assist Control/ Continuous Mandatory Ventilation)  RR (machine): 20  TV (machine): 400  FiO2: 50  PEEP: 5  ITime: 1  MAP: 11  PIP: 25    Mode: AC/ CMV (Assist Control/ Continuous Mandatory Ventilation), RR (machine): 20, TV (machine): 400, FiO2: 50, PEEP: 5, ITime: 1, MAP: 11, PIP: 25  LABS:                        9.9    12.5  )-----------( 260      ( 04 Feb 2018 18:25 )             32.0     02-04    145  |  117<H>  |  24<H>  ----------------------------<  106<H>  3.9   |  17<L>  |  1.20    Ca    6.7<L>      04 Feb 2018 19:31    TPro  4.7<L>  /  Alb  2.1<L>  /  TBili  0.4  /  DBili  x   /  AST  80<H>  /  ALT  64  /  AlkPhos  74  02-04      CARDIAC MARKERS ( 04 Feb 2018 19:31 )  <.015 ng/mL / x     / 51 U/L / x     / 1.7 ng/mL      CAPILLARY BLOOD GLUCOSE          PT/INR - ( 04 Feb 2018 18:56 )   PT: 11.6 sec;   INR: 1.06 ratio         PTT - ( 04 Feb 2018 18:56 )  PTT:29.2 sec    CULTURES:  Rapid RVP Result: Dale (02-04 @ 21:03)      Medications:  piperacillin/tazobactam IVPB. 3.375 Gram(s) IV Intermittent every 12 hours  vancomycin    Solution 125 milliGRAM(s) Oral every 6 hours    norepinephrine Infusion 1 MICROgram(s)/kG/Min IV Continuous <Continuous>      fentaNYL    Injectable 50 MICROGram(s) IV Push every 2 hours PRN      heparin  Injectable 5000 Unit(s) SubCutaneous every 12 hours    pantoprazole  Injectable 40 milliGRAM(s) IV Push daily      hydrocortisone sodium succinate Injectable 50 milliGRAM(s) IV Push every 6 hours    sodium bicarbonate  Infusion 0.306 mEq/kG/Hr IV Continuous <Continuous>      chlorhexidine 0.12% Liquid 15 milliLiter(s) Swish and Spit two times a day              RADIOLOGY/IMAGING/ECHO    Critical care point of care ultrasound:    Assessment/Plan:        Minutes of Critical Care time:   120 minutes  (Reviewing data, imaging, discussing with multidisciplinary team, non inclusive of procedures, discussing goals of care with patient/family) 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  Osteoarthritis  Afib  PVD (peripheral vascular disease)  Neuropathy: (Right lower leg)  H/O cerebral aneurysm repair: brain clips  CVA (cerebral vascular accident): (&quot;Mini-stroke&quot;,1990&#x27;s)  Rheumatoid arthritis  Hyperlipidemia  Hypertension  S/P cataract surgery: (Left eye)  Elective surgery: (&quot;Twisted bowel&quot;, 2014)  Elective surgery: (Exision of cyst on liver, 1985)  S/P ORIF (open reduction internal fixation) fracture: Left hip fx (2012) &amp; R hip fx (2013)  H/O cerebral aneurysm repair: Brain clips (1978(  Renal stone: Cysto stent placement 10/1/2014  PVD (peripheral vascular disease): s/p RLE bypass x 3, most recent 3/2012 Right external iliac to PT bypass w/ PTFE (2012)  S/P FRED (total abdominal hysterectomy): (1987, Hx of &quot;ovarian cancer?&quot;)    CARITO CONSTANTINO   82y    Female    BRIEF HOSPITAL COURSE:    82F PMHx Afib on Coumadin, HLD, CVA (1990's), MVP, OA, HTN, RA, PVD (s/p RLE bypassx3, most recently 2012), Neuropathy, hx OM BIBEMS from home with cc of diarrhea found to be bradycardic and hypotensive. Unable to obtain any history from patient as she was intubated prior to my evaluation. As per daughters at bedside, patient was complaining of watery diarrhea for past couple weeks, which seemed to be resolving. Patient was doing well last they had spoke to her earlier this morning until about 3pm when aid felt patient seemed very weak and tired and called ambulance. Of note per daughters, patient had seen her cardiologist about 10 days prior, was fount to be hypertensive at which time at diuretic was added and two of her antihypertensive medication doses were increased.     In the ED, patient was found to be severely bradycardic with HR in 30's and hypotensive with blood pressure around 50-60 systolic and diastolic 30-40's. Received  epi as she was transiently pulseless.  TVP placed.  dopamine and levophed started.        Review of Systems:   [facility]                  ICU Vital Signs Last 24 Hrs  T(C): 34.2 (04 Feb 2018 22:00), Max: 36.8 (04 Feb 2018 18:12)  T(F): 93.6 (04 Feb 2018 22:00), Max: 98.3 (04 Feb 2018 18:12)  HR: 63 (04 Feb 2018 23:27) (32 - 84)  BP: 96/57 (04 Feb 2018 23:00) (50/30 - 124/62)  BP(mean): 69 (04 Feb 2018 23:00) (69 - 83)  ABP: --  ABP(mean): --  RR: 16 (04 Feb 2018 23:00) (14 - 16)  SpO2: 93% (04 Feb 2018 23:27) (90% - 100%)    Physical Examination:    General:  sedate     HEENT:  RIJ intoducer     PULM:  bilateral BS    CVS:  s1 s2 Paced at 50    ABD:  soft not distended no reboung    EXT:  R second toe missing amputated a few months ago.  no signs of infection    SKIN:     Neuro:     ABG - ( 04 Feb 2018 23:23 )  pH: 7.25  /  pCO2: 37    /  pO2: 67    / HCO3: 16    / Base Excess: -10.2 /  SaO2: 89          Mode: AC/ CMV (Assist Control/ Continuous Mandatory Ventilation)  RR (machine): 20  TV (machine): 400  FiO2: 50  PEEP: 5  ITime: 1  MAP: 11  PIP: 25    Mode: AC/ CMV (Assist Control/ Continuous Mandatory Ventilation), RR (machine): 20, TV (machine): 400, FiO2: 50, PEEP: 5, ITime: 1, MAP: 11, PIP: 25  LABS:                        9.9    12.5  )-----------( 260      ( 04 Feb 2018 18:25 )             32.0     02-04    145  |  117<H>  |  24<H>  ----------------------------<  106<H>  3.9   |  17<L>  |  1.20    Ca    6.7<L>      04 Feb 2018 19:31    TPro  4.7<L>  /  Alb  2.1<L>  /  TBili  0.4  /  DBili  x   /  AST  80<H>  /  ALT  64  /  AlkPhos  74  02-04      CARDIAC MARKERS ( 04 Feb 2018 19:31 )  <.015 ng/mL / x     / 51 U/L / x     / 1.7 ng/mL      CAPILLARY BLOOD GLUCOSE    PT/INR - ( 04 Feb 2018 18:56 )   PT: 11.6 sec;   INR: 1.06 ratio         PTT - ( 04 Feb 2018 18:56 )  PTT:29.2 sec    CULTURES:  Rapid RVP Result: NotDetec (02-04 @ 21:03)      Medications:  piperacillin/tazobactam IVPB. 3.375 Gram(s) IV Intermittent every 12 hours  vancomycin    Solution 125 milliGRAM(s) Oral every 6 hours  norepinephrine Infusion 1 MICROgram(s)/kG/Min IV Continuous <Continuous>  fentaNYL    Injectable 50 MICROGram(s) IV Push every 2 hours PRN  heparin  Injectable 5000 Unit(s) SubCutaneous every 12 hours  pantoprazole  Injectable 40 milliGRAM(s) IV Push daily  hydrocortisone sodium succinate Injectable 50 milliGRAM(s) IV Push every 6 hours  sodium bicarbonate  Infusion 0.306 mEq/kG/Hr IV Continuous <Continuous>  chlorhexidine 0.12% Liquid 15 milliLiter(s) Swish and Spit two times a day    RADIOLOGY/IMAGING/ECHO    Critical care point of care ultrasound:  A line predominant  LV underfilled no pericardial EFFUSION EF appear slighly reduced.      Assessment/Plan:    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     Per daughters, patient's  BP meds (BB and Ca++ channel blocker and diuretic)  were recently increased by cardiology. Perhaps there is a component of this contributing to hypotension with bradycardia.  She is also hypothermic raising the question of adrenal insuff in steeing of stress.      Possible UTI      Neuro  awoke after episode reaching for tube No need for head CT she was lucid PT decompensation.  Sedate with prn analgesia/anxyolytic   Cor   Periods of asystole when paced stopped.  Pacer set at 50.  She has periods of SB junctional rhythm and a fib with SVR.  Trend CE  first Trop neg  Pulm  Vent support SBT when stable   MV increased to help with low PH MA  Gi  PPI feed soon   CT abd when stable.    Renal   BRIDGET oliguric with NAGMA  lactate initially normal then elevated.  contine fluid resus  may require HD,   Heme/DVT  supposes to be on coumadin.  INR not elevated asked daughter to bring home meds   ID  Broead ABX   rx for C diff till toxin result back   check PCT  cultures sent.    Endo   R/O benita's ( random cortisol) stress steroids  Lines/tubes  RIH introducer  TVP chaudhary   will need a-line         Minutes of Critical Care time:   120 minutes  independent of procedures.   (Reviewing data, imaging, discussing with multidisciplinary team, non inclusive of procedures, discussing goals of care with patient/family)   Addressed with daughters at bedside.    D/W Dr Sanchez E-ICU and Dr Moya cardiology.

## 2018-02-04 NOTE — H&P ADULT - NSHPLABSRESULTS_GEN_ALL_CORE
9.9    12.5  )-----------( 260      ( 04 Feb 2018 18:25 )             32.0     02-04    145  |  117<H>  |  24<H>  ----------------------------<  106<H>  3.9   |  17<L>  |  1.20    Ca    6.7<L>      04 Feb 2018 19:31    TPro  4.7<L>  /  Alb  2.1<L>  /  TBili  0.4  /  DBili  x   /  AST  80<H>  /  ALT  64  /  AlkPhos  74  02-04    PT/INR - ( 04 Feb 2018 18:56 )   PT: 11.6 sec;   INR: 1.06 ratio         PTT - ( 04 Feb 2018 18:56 )  PTT:29.2 sec    Lactate, Blood: 1.6 mmol/L (02-04 @ 18:56)

## 2018-02-04 NOTE — H&P ADULT - NSHPSOCIALHISTORY_GEN_ALL_CORE
Physical condition: no walker, no O2 needed  no smoking, social drinker, no recreational drug using  Immunization: flu , Pneumonia

## 2018-02-04 NOTE — PROCEDURE NOTE - NSINDICATIONS_GEN_A_CORE
drainage
transvenous pacemaker insertion/critical illness/emergency venous access
symptomatic bradycardia

## 2018-02-04 NOTE — H&P ADULT - PROBLEM SELECTOR PLAN 1
s/p transcutaneous pacemaker in ED; unclear etiology  1. Admit to ICU for closer monitoring possibly hypovolumic in setting of infectious/viral process, requiring pressors   1. Continue Levophed and Dopamine gtt, titrate as per ICU   2. Vitals per routine  3. F/u RVP   4. F/u urine/blood cultures  5. Continue Protonix IV ppx  6. Fentanyl drip for pain   7. Precedex for sedation likely hypovolumic considering recent chronic diarrhea vs. septic shock vs. possible adrenal crisis   1. Admit to ICU  2. Continue Levophed gtt for pressor support, titrate as per ICU  3. Continue Solucortef 50 IV q6h   4. PPI IV for ppx   5. Bateman catheter to monitor I&Os   6. Follow up AM labs, urine/blood cultures   7. Vitals per routine  8. F/u RVP, C.dif  9. Warming blanket for hypothermia  10. Continue Zosyn q12 as per ICU

## 2018-02-04 NOTE — ED PROVIDER NOTE - CARE PLAN
Principal Discharge DX:	Enteritis  Secondary Diagnosis:	Hypotension  Secondary Diagnosis:	Bradycardia

## 2018-02-04 NOTE — ED ADULT NURSE NOTE - OBJECTIVE STATEMENT
Pt. Pt. presenting to the ED with complaints of diarrhea for a week and abdominal pain denies nausea and vomiting, fever, black tarry stool or blood in stool. Reports dizziness with position change. On arrival to the ED pt. was hypotensive, pale, bradycardic and reports feeling tired. Denied active abdominal pain when first arrived. Pt. had low oxygen sensation upon arrival. Cardiac monitoring ongoing, given 2 liters fluid bolus, 2 L o2 via NC. Pacer pads applied, will continue to monitor

## 2018-02-04 NOTE — H&P ADULT - HISTORY OF PRESENT ILLNESS
82F PMHx Afib on Coumadin, HLD, CVA (1990's), MVP, OA, HTN, RA, PVD (s/p RLE bypassx3, most recently 2012), Neuropathy, hx OM BIBEMS from home with cc of diarrhea found to be bradycardic and hypotensive. Unable to obtain any history from patient as she was intubated prior to my evaluation.     In the ED, patient was found to be severely bradycardic with HR in 30's and hypotensive with blood pressure around 50-60 systolic and diastolic 30-40's. Received 3.5L NS, Zosyn x1, Calcium gluconate, Humalog, Zofran x1. Patient was intubated and is s/p transcutaneous PPM placement by ICU in ED. Started on Dopamine and Levophed drip. 82F PMHx Afib on Coumadin, HLD, CVA (1990's), MVP, OA, HTN, RA, PVD (s/p RLE bypassx3, most recently 2012), Neuropathy, hx OM BIBEMS from home with cc of diarrhea found to be bradycardic and hypotensive. Unable to obtain any history from patient as she was intubated prior to my evaluation.     In the ED, patient was found to be severely bradycardic with HR in 30's and hypotensive with blood pressure around 50-60 systolic and diastolic 30-40's. Received 3.5L NS, Zosyn x1, Calcium gluconate, Humalog, Zofran x1. EKG showed HR 40bpm with undetermined rhythm, septal infarct age undetermined. CT abdomen/pelvis pending to evaluate for abdominal pain. Patient was intubated and is s/p transcutaneous PPM placement by ICU in ED. Started on Dopamine and Levophed drip, admitted to ICU. 82F PMHx Afib on Coumadin, HLD, CVA (1990's), MVP, OA, HTN, RA, PVD (s/p RLE bypassx3, most recently 2012), Neuropathy, hx OM BIBEMS from home with cc of diarrhea found to be bradycardic and hypotensive. Unable to obtain any history from patient as she was intubated prior to my evaluation.     In the ED, patient was found to be severely bradycardic with HR in 30's and hypotensive with blood pressure around 50-60 systolic and diastolic 30-40's. Received 3.5L NS, Zosyn x1, Calcium gluconate, Humalog, Zofran x1. EKG showed HR 40bpm with undetermined rhythm, septal infarct age undetermined. CT abdomen/pelvis pending to evaluate for abdominal pain. Patient was intubated and is s/p transcutaneous pacemaker placement by ICU in ED. Started on Dopamine and Levophed drip, admitted to ICU. 82F PMHx Afib on Coumadin, HLD, CVA (1990's), MVP, OA, HTN, RA, PVD (s/p RLE bypassx3, most recently 2012), Neuropathy, hx OM BIBEMS from home with cc of diarrhea found to be bradycardic and hypotensive. Unable to obtain any history from patient as she was intubated prior to my evaluation. As per daughters at bedside, patient was complaining of watery diarrhea for past couple weeks, which seemed to be resolving. Patient was doing well last they had spoke to her earlier this morning until about 3pm when aid felt patient seemed very weak and tired and called ambulance. Of note per daughters, patient had seen her cardiologist about 10 days prior, was fount to be hypertensive at which time at diuretic was added and two of her antihypertensive medication doses were increased.     In the ED, patient was found to be severely bradycardic with HR in 30's and hypotensive with blood pressure around 50-60 systolic and diastolic 30-40's. Received 3.5L NS, Zosyn x1, Calcium gluconate, Humalog, Zofran x1. EKG showed HR 40bpm with undetermined rhythm, septal infarct age undetermined. Patient was intubated and is s/p transcutaneous pacemaker placement by ICU in ED. Started on Dopamine and Levophed drip, admitted to ICU.

## 2018-02-04 NOTE — H&P ADULT - NSHPPHYSICALEXAM_GEN_ALL_CORE
General: intubated  HEENT: intubated, NCAT  Neck: Supple, nontender, no mass  Neurology: unable to assess  Respiratory: CTA B/L, No W/R/R  CV: bradycardic, +S1/S2, no murmurs, rubs or gallops  Abdominal: Soft, NT, ND +BSx4  Extremities: No C/C/E, + peripheral pulses  MSK: Normal ROM, no joint erythema or warmth, no joint swelling   Skin: warm, dry, normal color, no rash or abnormal lesions

## 2018-02-04 NOTE — ED ADULT NURSE NOTE - CHPI ED SYMPTOMS NEG
no chills/no vomiting/no dysuria/no blood in stool/no hematuria/no abdominal distension/no burning urination/no fever

## 2018-02-04 NOTE — H&P ADULT - PMH
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

## 2018-02-04 NOTE — H&P ADULT - PROBLEM SELECTOR PLAN 3
s/p Zosyn x1 dose  1. CT abdomen/pelvis pending, follow up  2. f/u AM labs  3. F/u blood cultures   4. F/u C.dif toxin considering chronic diarrhea and history of C.dif will need to evaluate with C.dif toxin   1. F/u C.dif   2. Contact precautions

## 2018-02-05 ENCOUNTER — OTHER (OUTPATIENT)
Age: 83
End: 2018-02-05

## 2018-02-05 LAB
ALBUMIN SERPL ELPH-MCNC: 2.5 G/DL — LOW (ref 3.3–5)
ALP SERPL-CCNC: 125 U/L — HIGH (ref 40–120)
ALT FLD-CCNC: 174 U/L — HIGH (ref 12–78)
ANION GAP SERPL CALC-SCNC: 10 MMOL/L — SIGNIFICANT CHANGE UP (ref 5–17)
ANION GAP SERPL CALC-SCNC: 12 MMOL/L — SIGNIFICANT CHANGE UP (ref 5–17)
APPEARANCE UR: ABNORMAL
AST SERPL-CCNC: 233 U/L — HIGH (ref 15–37)
BACTERIA # UR AUTO: ABNORMAL
BASE EXCESS BLDA CALC-SCNC: -6 MMOL/L — LOW (ref -2–2)
BILIRUB SERPL-MCNC: 0.6 MG/DL — SIGNIFICANT CHANGE UP (ref 0.2–1.2)
BILIRUB UR-MCNC: NEGATIVE — SIGNIFICANT CHANGE UP
BLOOD GAS COMMENTS ARTERIAL: SIGNIFICANT CHANGE UP
BLOOD GAS COMMENTS ARTERIAL: SIGNIFICANT CHANGE UP
BUN SERPL-MCNC: 21 MG/DL — SIGNIFICANT CHANGE UP (ref 7–23)
BUN SERPL-MCNC: 25 MG/DL — HIGH (ref 7–23)
CALCIUM SERPL-MCNC: 7.1 MG/DL — LOW (ref 8.5–10.1)
CALCIUM SERPL-MCNC: 7.4 MG/DL — LOW (ref 8.5–10.1)
CHLORIDE SERPL-SCNC: 115 MMOL/L — HIGH (ref 96–108)
CHLORIDE SERPL-SCNC: 117 MMOL/L — HIGH (ref 96–108)
CK MB BLD-MCNC: 3.3 % — SIGNIFICANT CHANGE UP (ref 0–3.5)
CK MB CFR SERPL CALC: 1.4 NG/ML — SIGNIFICANT CHANGE UP (ref 0–3.6)
CK SERPL-CCNC: 43 U/L — SIGNIFICANT CHANGE UP (ref 26–192)
CO2 SERPL-SCNC: 19 MMOL/L — LOW (ref 22–31)
CO2 SERPL-SCNC: 19 MMOL/L — LOW (ref 22–31)
COLOR SPEC: YELLOW — SIGNIFICANT CHANGE UP
CREAT ?TM UR-MCNC: 43 MG/DL — SIGNIFICANT CHANGE UP
CREAT SERPL-MCNC: 1.5 MG/DL — HIGH (ref 0.5–1.3)
CREAT SERPL-MCNC: 1.6 MG/DL — HIGH (ref 0.5–1.3)
DIFF PNL FLD: ABNORMAL
EPI CELLS # UR: ABNORMAL
GLUCOSE SERPL-MCNC: 130 MG/DL — HIGH (ref 70–99)
GLUCOSE SERPL-MCNC: 203 MG/DL — HIGH (ref 70–99)
GLUCOSE UR QL: 50 MG/DL
HCO3 BLDA-SCNC: 20 MMOL/L — LOW (ref 23–27)
HCT VFR BLD CALC: 29.9 % — LOW (ref 34.5–45)
HGB BLD-MCNC: 9.5 G/DL — LOW (ref 11.5–15.5)
HOROWITZ INDEX BLDA+IHG-RTO: 40 — SIGNIFICANT CHANGE UP
KETONES UR-MCNC: NEGATIVE — SIGNIFICANT CHANGE UP
LACTATE SERPL-SCNC: 2.3 MMOL/L — HIGH (ref 0.7–2)
LACTATE SERPL-SCNC: 2.7 MMOL/L — HIGH (ref 0.7–2)
LEUKOCYTE ESTERASE UR-ACNC: ABNORMAL
MCHC RBC-ENTMCNC: 28.5 PG — SIGNIFICANT CHANGE UP (ref 27–34)
MCHC RBC-ENTMCNC: 31.6 GM/DL — LOW (ref 32–36)
MCV RBC AUTO: 90.2 FL — SIGNIFICANT CHANGE UP (ref 80–100)
NITRITE UR-MCNC: NEGATIVE — SIGNIFICANT CHANGE UP
PCO2 BLDA: 31 MMHG — LOW (ref 32–46)
PH BLDA: 7.39 — SIGNIFICANT CHANGE UP (ref 7.35–7.45)
PH UR: 5 — SIGNIFICANT CHANGE UP (ref 5–8)
PLATELET # BLD AUTO: 280 K/UL — SIGNIFICANT CHANGE UP (ref 150–400)
PO2 BLDA: 141 MMHG — HIGH (ref 74–108)
POTASSIUM SERPL-MCNC: 2.9 MMOL/L — CRITICAL LOW (ref 3.5–5.3)
POTASSIUM SERPL-MCNC: 3.9 MMOL/L — SIGNIFICANT CHANGE UP (ref 3.5–5.3)
POTASSIUM SERPL-SCNC: 2.9 MMOL/L — CRITICAL LOW (ref 3.5–5.3)
POTASSIUM SERPL-SCNC: 3.9 MMOL/L — SIGNIFICANT CHANGE UP (ref 3.5–5.3)
PROCALCITONIN SERPL-MCNC: 0.3 NG/ML — HIGH (ref 0–0.04)
PROT SERPL-MCNC: 5.6 G/DL — LOW (ref 6–8.3)
PROT UR-MCNC: 500 MG/DL
RBC # BLD: 3.32 M/UL — LOW (ref 3.8–5.2)
RBC # FLD: 15.1 % — HIGH (ref 10.3–14.5)
RBC CASTS # UR COMP ASSIST: ABNORMAL /HPF (ref 0–4)
SAO2 % BLDA: 99 % — HIGH (ref 92–96)
SODIUM SERPL-SCNC: 146 MMOL/L — HIGH (ref 135–145)
SODIUM SERPL-SCNC: 146 MMOL/L — HIGH (ref 135–145)
SODIUM UR-SCNC: 91 MMOL/L — SIGNIFICANT CHANGE UP
SP GR SPEC: 1.01 — SIGNIFICANT CHANGE UP (ref 1.01–1.02)
T3 SERPL-MCNC: 70 NG/DL — LOW (ref 80–200)
T4 AB SER-ACNC: 5.8 UG/DL — SIGNIFICANT CHANGE UP (ref 4.6–12)
TROPONIN I SERPL-MCNC: 0.04 NG/ML — SIGNIFICANT CHANGE UP (ref 0.01–0.04)
TROPONIN I SERPL-MCNC: 0.09 NG/ML — HIGH (ref 0.01–0.04)
TROPONIN I SERPL-MCNC: 0.1 NG/ML — HIGH (ref 0.01–0.04)
TSH SERPL-MCNC: 1.45 UIU/ML — SIGNIFICANT CHANGE UP (ref 0.36–3.74)
UROBILINOGEN FLD QL: NEGATIVE — SIGNIFICANT CHANGE UP
WBC # BLD: 12.8 K/UL — HIGH (ref 3.8–10.5)
WBC # FLD AUTO: 12.8 K/UL — HIGH (ref 3.8–10.5)
WBC UR QL: ABNORMAL

## 2018-02-05 PROCEDURE — 99291 CRITICAL CARE FIRST HOUR: CPT

## 2018-02-05 PROCEDURE — 71045 X-RAY EXAM CHEST 1 VIEW: CPT | Mod: 26

## 2018-02-05 PROCEDURE — 74018 RADEX ABDOMEN 1 VIEW: CPT | Mod: 26,59

## 2018-02-05 PROCEDURE — 93306 TTE W/DOPPLER COMPLETE: CPT | Mod: 26

## 2018-02-05 PROCEDURE — 76775 US EXAM ABDO BACK WALL LIM: CPT | Mod: 26

## 2018-02-05 RX ORDER — HEPARIN SODIUM 5000 [USP'U]/ML
5000 INJECTION INTRAVENOUS; SUBCUTANEOUS EVERY 8 HOURS
Qty: 0 | Refills: 0 | Status: DISCONTINUED | OUTPATIENT
Start: 2018-02-05 | End: 2018-02-06

## 2018-02-05 RX ORDER — DEXMEDETOMIDINE HYDROCHLORIDE IN 0.9% SODIUM CHLORIDE 4 UG/ML
0.2 INJECTION INTRAVENOUS
Qty: 200 | Refills: 0 | Status: DISCONTINUED | OUTPATIENT
Start: 2018-02-05 | End: 2018-02-06

## 2018-02-05 RX ORDER — MAGNESIUM SULFATE 500 MG/ML
2 VIAL (ML) INJECTION ONCE
Qty: 0 | Refills: 0 | Status: COMPLETED | OUTPATIENT
Start: 2018-02-05 | End: 2018-02-05

## 2018-02-05 RX ORDER — FENTANYL CITRATE 50 UG/ML
25 INJECTION INTRAVENOUS
Qty: 0 | Refills: 0 | Status: DISCONTINUED | OUTPATIENT
Start: 2018-02-05 | End: 2018-02-06

## 2018-02-05 RX ORDER — POTASSIUM PHOSPHATE, MONOBASIC POTASSIUM PHOSPHATE, DIBASIC 236; 224 MG/ML; MG/ML
15 INJECTION, SOLUTION INTRAVENOUS ONCE
Qty: 0 | Refills: 0 | Status: COMPLETED | OUTPATIENT
Start: 2018-02-05 | End: 2018-02-05

## 2018-02-05 RX ORDER — POTASSIUM CHLORIDE 20 MEQ
20 PACKET (EA) ORAL
Qty: 0 | Refills: 0 | Status: COMPLETED | OUTPATIENT
Start: 2018-02-05 | End: 2018-02-05

## 2018-02-05 RX ORDER — NOREPINEPHRINE BITARTRATE/D5W 8 MG/250ML
1 PLASTIC BAG, INJECTION (ML) INTRAVENOUS
Qty: 8 | Refills: 0 | Status: DISCONTINUED | OUTPATIENT
Start: 2018-02-05 | End: 2018-02-06

## 2018-02-05 RX ADMIN — Medication 50 MILLIEQUIVALENT(S): at 13:16

## 2018-02-05 RX ADMIN — Medication 100 GRAM(S): at 11:25

## 2018-02-05 RX ADMIN — FENTANYL CITRATE 25 MICROGRAM(S): 50 INJECTION INTRAVENOUS at 20:12

## 2018-02-05 RX ADMIN — DEXMEDETOMIDINE HYDROCHLORIDE IN 0.9% SODIUM CHLORIDE 2.46 MICROGRAM(S)/KG/HR: 4 INJECTION INTRAVENOUS at 18:56

## 2018-02-05 RX ADMIN — CHLORHEXIDINE GLUCONATE 15 MILLILITER(S): 213 SOLUTION TOPICAL at 18:55

## 2018-02-05 RX ADMIN — Medication 125 MILLIGRAM(S): at 11:34

## 2018-02-05 RX ADMIN — FENTANYL CITRATE 50 MICROGRAM(S): 50 INJECTION INTRAVENOUS at 02:38

## 2018-02-05 RX ADMIN — PIPERACILLIN AND TAZOBACTAM 25 GRAM(S): 4; .5 INJECTION, POWDER, LYOPHILIZED, FOR SOLUTION INTRAVENOUS at 07:04

## 2018-02-05 RX ADMIN — Medication 50 MILLIGRAM(S): at 23:54

## 2018-02-05 RX ADMIN — HEPARIN SODIUM 5000 UNIT(S): 5000 INJECTION INTRAVENOUS; SUBCUTANEOUS at 14:47

## 2018-02-05 RX ADMIN — Medication 50 MILLIEQUIVALENT(S): at 15:19

## 2018-02-05 RX ADMIN — Medication 50 MILLIGRAM(S): at 06:48

## 2018-02-05 RX ADMIN — Medication 125 MILLIGRAM(S): at 06:48

## 2018-02-05 RX ADMIN — Medication 50 MILLIGRAM(S): at 11:33

## 2018-02-05 RX ADMIN — POTASSIUM PHOSPHATE, MONOBASIC POTASSIUM PHOSPHATE, DIBASIC 62.5 MILLIMOLE(S): 236; 224 INJECTION, SOLUTION INTRAVENOUS at 11:10

## 2018-02-05 RX ADMIN — HEPARIN SODIUM 5000 UNIT(S): 5000 INJECTION INTRAVENOUS; SUBCUTANEOUS at 22:02

## 2018-02-05 RX ADMIN — CHLORHEXIDINE GLUCONATE 15 MILLILITER(S): 213 SOLUTION TOPICAL at 06:47

## 2018-02-05 RX ADMIN — Medication 92.04 MICROGRAM(S)/KG/MIN: at 06:47

## 2018-02-05 RX ADMIN — Medication 125 MILLIGRAM(S): at 00:27

## 2018-02-05 RX ADMIN — Medication 50 MILLIGRAM(S): at 18:55

## 2018-02-05 RX ADMIN — Medication 50 MILLIEQUIVALENT(S): at 11:20

## 2018-02-05 RX ADMIN — FENTANYL CITRATE 50 MICROGRAM(S): 50 INJECTION INTRAVENOUS at 18:40

## 2018-02-05 RX ADMIN — PANTOPRAZOLE SODIUM 40 MILLIGRAM(S): 20 TABLET, DELAYED RELEASE ORAL at 11:33

## 2018-02-05 RX ADMIN — FENTANYL CITRATE 50 MICROGRAM(S): 50 INJECTION INTRAVENOUS at 02:23

## 2018-02-05 RX ADMIN — FENTANYL CITRATE 50 MICROGRAM(S): 50 INJECTION INTRAVENOUS at 18:55

## 2018-02-05 RX ADMIN — HEPARIN SODIUM 5000 UNIT(S): 5000 INJECTION INTRAVENOUS; SUBCUTANEOUS at 06:48

## 2018-02-05 RX ADMIN — Medication 92.04 MICROGRAM(S)/KG/MIN: at 08:06

## 2018-02-05 RX ADMIN — PIPERACILLIN AND TAZOBACTAM 25 GRAM(S): 4; .5 INJECTION, POWDER, LYOPHILIZED, FOR SOLUTION INTRAVENOUS at 18:55

## 2018-02-05 NOTE — DIETITIAN INITIAL EVALUATION ADULT. - PROBLEM SELECTOR PLAN 1
likely hypovolumic considering recent chronic diarrhea vs. septic shock vs. possible adrenal crisis   1. Admit to ICU  2. Continue Levophed gtt for pressor support, titrate as per ICU  3. Continue Solucortef 50 IV q6h   4. PPI IV for ppx   5. Bateman catheter to monitor I&Os   6. Follow up AM labs, urine/blood cultures   7. Vitals per routine  8. F/u RVP, C.dif  9. Warming blanket for hypothermia  10. Continue Zosyn q12 as per ICU

## 2018-02-05 NOTE — CONSULT NOTE ADULT - SUBJECTIVE AND OBJECTIVE BOX
Reason for Consult : Possible shock ? 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 Unable to obtain any history from patient as she was intubated prior to my evaluation. As per daughters at bedside, patient was complaining of watery diarrhea for past couple weeks, which seemed to be resolving. Patient was doing well last they had spoke to her earlier this morning until about 3pm when aid felt patient seemed very weak and tired and called ambulance. Of note per daughters, patient had seen her cardiologist about 10 days prior, was fount to be hypertensive at which time at diuretic was added and two of her antihypertensive medication doses were increased.     In the ED, patient was found to be severely bradycardic with HR in 30's and hypotensive with blood pressure around 50-60 systolic and diastolic 30-40's. Received 3.5L NS, Zosyn x1, Calcium gluconate, Humalog, Zofran x1. EKG showed HR 40bpm with undetermined rhythm, septal infarct age undetermined. Patient was intubated and is s/p transcutaneous pacemaker placement by ICU in ED. Started on Dopamine and Levophed drip, admitted to ICU. (2018 21:17)  Her stool was collected for  C diff but was rejected by lab as it was formed stool.    She has severe metabolic acidosis, electrolyte imbalance and BRIDGET with oliguria .      She was on IV antibiotics Ancef x 6 weeks as a TIFFANIE  in November and completed antibiotics by 17 . 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  Osteoarthritis  Afib  PVD (peripheral vascular disease)  Neuropathy: (Right lower leg)  H/O cerebral aneurysm repair: brain clips  CVA (cerebral vascular accident): (&quot;Mini-stroke&quot;,&#x27;s)  Rheumatoid arthritis  Hyperlipidemia  Hypertension  S/P cataract surgery: (Left eye)  Elective surgery: (&quot;Twisted bowel&quot;, 2014)  Elective surgery: (Exision of cyst on liver, )  S/P ORIF (open reduction internal fixation) fracture: Left hip fx () &amp; R hip fx ()  H/O cerebral aneurysm repair: Brain clips ((  Renal stone: Cysto stent placement 10/1/2014  PVD (peripheral vascular disease): s/p RLE bypass x 3, most recent 3/2012 Right external iliac to PT bypass w/ PTFE ()  S/P FRED (total abdominal hysterectomy): (, Hx of &quot;ovarian cancer?&quot;)      Social History-- Retired lives at home has HHA   EtOH: denies   Tobacco: denies   Drug Use: denies     Travel/Environmental/Occupational History:    FAMILY HISTORY:  Family history of aneurysm      Allergies    No Known Allergies    Intolerances        Home/Outpatient  Medications   Home Medications:  acetaminophen 325 mg oral tablet: 2 tab(s) orally every 6 hours, As needed, Mild Pain (1 - 3) (15 Nov 2017 13:57)  aspirin 81 mg oral delayed release tablet: 1 tab(s) orally once a day (28 Oct 2017 15:00)  atorvastatin 40 mg oral tablet: 1 tab(s) orally once a day (at bedtime) (28 Oct 2017 15:00)  ceFAZolin: 1 milligram(s) intravenous 2 times a day infused over 30 minutes (15 Nov 2017 13:57)  docusate sodium 100 mg oral capsule: 1 cap(s) orally 2 times a day, As needed, Constipation (15 Nov 2017 13:57)  gabapentin 100 mg oral capsule:  orally 3 times a day (28 Oct 2017 15:00)  losartan 50 mg oral tablet: 1 tab(s) orally once a day (at bedtime) (28 Oct 2017 15:00)  metoprolol tartrate 100 mg oral tablet: 1 tab(s) orally 2 times a day (28 Oct 2017 15:00)  multivitamin:   once a day (28 Oct 2017 15:00)  nystatin 100,000 units/g topical powder: 1 application topically 2 times a day (15 Nov 2017 13:57)  ondansetron 2 mg/mL injectable solution:  injectable  (15 Nov 2017 13:57)  oxyCODONE 10 mg oral tablet: 1 tab(s) orally every 4 hours, As needed, Severe Pain (7 - 10) (15 Nov 2017 13:57)  oxyCODONE 5 mg oral tablet: 1 tab(s) orally every 4 hours, As needed, Moderate Pain (4 - 6) (15 Nov 2017 13:57)  polyethylene glycol 3350 oral powder for reconstitution: 17 gram(s) orally once a day, As needed, Constipation (15 Nov 2017 13:57)  senna oral tablet: 2 tab(s) orally once a day (at bedtime) (15 Nov 2017 13:57)      Current Inpatient Medications :    ANTIBIOTICS:   piperacillin/tazobactam IVPB. 3.375 Gram(s) IV Intermittent every 12 hours  vancomycin    Solution 125 milliGRAM(s) Oral every 6 hours      OTHER RELEVANT MEDICATIONS :  chlorhexidine 0.12% Liquid 15 milliLiter(s) Swish and Spit two times a day  dexmedetomidine Infusion 0.2 MICROgram(s)/kG/Hr IV Continuous <Continuous>  fentaNYL    Injectable 50 MICROGram(s) IV Push every 2 hours PRN  heparin  Injectable 5000 Unit(s) SubCutaneous every 8 hours  hydrocortisone sodium succinate Injectable 50 milliGRAM(s) IV Push every 6 hours  norepinephrine Infusion 1 MICROgram(s)/kG/Min IV Continuous <Continuous>  pantoprazole  Injectable 40 milliGRAM(s) IV Push daily  potassium chloride  20 mEq/100 mL IVPB 20 milliEquivalent(s) IV Intermittent every 2 hours          REVIEW OF SYSTEMS:    ROS:  Unable to obtain due to : as intubated         I&O's Detail    2018 07:  -  2018 07:00  --------------------------------------------------------  IN:    DOPamine Infusion: 18 mL    norepinephrine Infusion: 776 mL    Oral Fluid: 50 mL    sodium bicarbonate  Infusion: 900 mL    Solution: 100 mL  Total IN: 1844 mL    OUT:    Indwelling Catheter - Urethral: 150 mL  Total OUT: 150 mL    Total NET: 1694 mL      2018 07:01  -  2018 13:58  --------------------------------------------------------  IN:    dexmedetomidine Infusion: 3.7 mL    norepinephrine Infusion: 109.8 mL    Solution: 100 mL    Solution: 50 mL    Solution: 125 mL  Total IN: 388.5 mL    OUT:    Indwelling Catheter - Urethral: 225 mL  Total OUT: 225 mL    Total NET: 163.5 mL        Mode: AC/ CMV (Assist Control/ Continuous Mandatory Ventilation), RR (machine): 20, TV (machine): 400, FiO2: 40, PEEP: 5, ITime: 1, MAP: 10, PIP: 22    Physical Exam:  Vital Signs Last 24 Hrs  T(C): 37 (2018 12:30), Max: 37.6 (2018 09:15)  T(F): 98.6 (2018 12:30), Max: 99.6 (2018 09:15)  HR: 70 (2018 13:00) (32 - 84)  BP: 131/69 (2018 13:00) (50/30 - 147/74)  BP(mean): 94 (:) (69 - 102)  RR: 20 (2018 13:00) (14 - 29)  SpO2: 99% (:00) (90% - 100%)  Height (cm): 157.4 ( @ 18:21)  Weight (kg): 49.09 ( @ 18:21)  BMI (kg/m2): 19.8 ( @ 18:21)  BSA (m2): 1.47 ( @ 18:21)      GEN: intubated, sedated   HEENT: normocephalic and atraumatic. EOMI. GINI. Moist mucosa. Clear Posterior pharynx.  NECK: Supple. No carotid bruits.  No lymphadenopathy or thyromegaly.  LUNGS: Clear to auscultation.  HEART: Regular rate and rhythm without murmur.  ABDOMEN: Soft, nontender, and nondistended.  Positive bowel sounds.  No hepatosplenomegaly was noted.  EXTREMITIES: Without any cyanosis, clubbing, rash, lesions or edema., healed right 2nd toe amputation stump   NEUROLOGIC: A & O x 3, Cranial nerves II through XII are grossly intact. No focal neurological deficits   SKIN: No ulceration or induration present.      Labs:                9.5    12.8   )----------(  280       ( 2018 05:58 )               29.9      146    |  115    |  25     ----------------------------<  203        ( 2018 05:58 )  2.9     |  19     |  1.60     Ca    7.1        ( 2018 05:58 )  Phos  2.2       ( 2018 05:58 )  Mg     1.6       ( 2018 05:58 )    TPro  5.6    /  Alb  2.5    /  TBili  0.6    /  DBili  x      /  AST  233    /  ALT  174    /  AlkPhos  125    ( 2018 05:58 )    LIVER FUNCTIONS - ( 2018 05:58 )  Alb: 2.5 g/dL / Pro: 5.6 g/dL / ALK PHOS: 125 U/L / ALT: 174 U/L / AST: 233 U/L / GGT: x           PT/INR -  11.6 sec / 1.06 ratio   ( 2018 18:56 )       PTT -  29.2 sec   ( 2018 18:56 )  CAPILLARY BLOOD GLUCOSE    CARDIAC MARKERS ( 2018 07:03 )  .099 ng/mL / x     / x     / x     / x      CARDIAC MARKERS ( 2018 23:59 )  .085 ng/mL / x     / x     / x     / x      CARDIAC MARKERS ( 2018 19:31 )  <.015 ng/mL / x     / 51 U/L / x     / 1.7 ng/mL      Urinalysis Basic - ( 2018 01:02 )    Color: Yellow / Appearance: Turbid / S.010 / pH: x  Gluc: x / Ketone: Negative  / Bili: Negative / Urobili: Negative   Blood: x / Protein: 500 mg/dL / Nitrite: Negative   Leuk Esterase: Moderate / RBC: 11-25 /HPF / WBC 11-25   Sq Epi: x / Non Sq Epi: Moderate / Bacteria: TNTC      ABG - ( 2018 06:45 )  pH: 7.39  /  pCO2: 31    /  pO2: 141   / HCO3: 20    / Base Excess: -6.0  /  SaO2: 99        Lactate, Blood (18 @ 05:58)    Lactate, Blood: 2.3 mmol/L    Lactate, Blood (18 @ 23:59)    Lactate, Blood: 2.7 mmol/L    Lactate, Blood (18 @ 18:56)    Lactate, Blood: 1.6 mmol/L    Procalcitonin, Serum (18 @ 23:59)    Procalcitonin, Serum: 0.30:         RECENT CULTURES:          RADIOLOGY & ADDITIONAL STUDIES:     US Renal (18 @ 12:58) >    IMPRESSION: Echogenic kidneys compatible with medical renal disease. No   hydronephrosis.    9 mm nonobstructing right-sided intrarenal stone off the lower pole of   the kidney.    Gallstones and nonspecific gallbladder wall thickening. If there is   clinical suspicion of acute cholecystitis, recommend furtherevaluation   with HIDA scan.    < from: Xray Chest 1 View AP/PA (18 @ 20:25) >    Findings: The patient is status post endotracheal tube intubation with   the tip just above the rachel. There is a right cordis sheath present   with a Carroll-Tina catheter coursing through it, terminating at the right   ventricle.    There is mild pulmonary vascular congestion. The heart size is at the   upper limits of normal. The aorta is tortuous. There are mild multilevel   degenerative changes of the thoracic spine.    Impression: Intubated with mild pulmonary vascular congestion.        Assessment :     82F PMHx Afib on Coumadin (??compliance) , HLD, CVA (), MVP, OA, HTN, RA, PVD (s/p RLE bypassx3, most recently ), Neuropathy, hx OM 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  She is at high risk for CDAD as she was on IV antibiotics 6 weeks ago but her stool was formed .    She most ly likely had severe electrolyte imbalance and hypotension due ot recently increased doses of the cardiac meds and addition of diuretics and her intake was poor compounded by diarrhea . She has no clinical signs to suggest infectious etiology of shock .     Plan :   - will dc po Vancomycin   - if diarrhea persists may need GI work up, send stool for wbc and occult blood   - continue with IV Zosyn pending cs results, if cs negative then dc antibiotics   - correct electrolyte imbalance  - patient seen by nephrology and cardiology     Continue with present regime .  Appropriate use of antibiotics and adverse effects reviewed.      I have discussed the above plan of care with patient's family both son and daughter at bedside  in detail. They expressed understanding of the treatment plan . Risks, benefits and alternatives discussed in detail. I have asked if they have any questions or concerns and appropriately addressed them to the best of my ability . Advanced directives were discussed , she is a full code for now       > 65 minutes spent in direct patient care reviewing  the notes, lab data/ imaging , discussion with multidisciplinary team. All questions were addressed and answered to the best of my capacity .    Thank you for allowing me to participate in the care of your patient .

## 2018-02-05 NOTE — DIETITIAN INITIAL EVALUATION ADULT. - PROBLEM SELECTOR PLAN 3
considering chronic diarrhea and history of C.dif will need to evaluate with C.dif toxin   1. F/u C.dif   2. Contact precautions

## 2018-02-05 NOTE — PROGRESS NOTE ADULT - ATTENDING COMMENTS
83 yo F with Hx Afib on coumadin, distant CVA, PVD s/p bypass, osteomyelitis with R 2nd toe amputation in Nov 2017, RA presenting with diarrhea, BRIDGET and acidemia,  and found to be severely bradycardic and hypotensive progressing to cardiac arrest, resulting in acute respiratory failure.    --mentating well  precedex and prn fentanyl for sedatin  --bradycardia with cardiogenic v septic v hypovolemic shock  bradycardia suspect due to severe acidemia in setting of BB and CBB use  now resolved (SR in 80s), keep TVP for now  levophed weaning down, continue fluid repletion  check echo  --acute respiratory failure  continue full support for now  --BRIDGET likely from volume depletion, check urine lytes and renal US  fluid repletion as above  --diarrhea, d/c PO vanc  check stool Cx, resent Cdiff if diarrhea recurrs (was canceled as stool was not liquid)  start TF  --shock liver, monitor  --continue zosyn for empiric broad spectrum coverage, f/u Cx  --stress dose steroids for adrenal insufficiency  --plan discussed with daughter at bedside and cardiology  --pt critically ill. CC time 40min

## 2018-02-05 NOTE — CONSULT NOTE ADULT - ASSESSMENT
The etiology of patient's decompensation is unclear with several weeks of diarrhea and admission with bradycardia and hypotension requiring pressors and transvenous pacer placement. She reportedly had an increase in her negative chronotropic medicine but review of her office chart reveals only addition of chlorthalidone for hypertension and no change in her beta blocker or calcium blocker therapy. In any event, she is currently in a native non-paced atrial fibrillation with a ventricular response of approximately 40 beats per minute. Preliminary echocardiography examination revealed normal left ventricular function with no source of cardiogenic decompensation, unchanged from her prior recent echo. Her acidemia is much improved and blood work reveals hypokalemia. She remains on low-dose pressor therapy. She remains critically ill and 40 minutes of critical care time spent with patient    Recommend    Cont care per ICU team  Continue IV fluid  Antibiotics per ICU team  Taper pressors as tolerated  Continue transvenous pacemaker backu  Consider ID evaluation  replete potassium  Repeat cardiac enzymes  review echo  Further management will be dependent on her clinical course

## 2018-02-05 NOTE — PROGRESS NOTE ADULT - ASSESSMENT
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 Pt daughter, bp meds (BB, and Ca++ channel blocker and diuretic) recently increased by cardiology.    Neuro: intubated, no sedation, fentanyl prn for pain/vent synchrony  Cardio: ?BB overdose causing hypovolemic shock in setting of recent medication changes, temp pacer in place 2/2 periods of asystole, pacer set at 50. On levo, continue to wean, BP/HR overall stabilizing, in/out of afib and sinus, trops likely reflected of demand,   pulm: vent support for now, SBT when stable  GI: npo for now, ppi, CT abd when stable, elevated LFTs likely reflective of shock liver, will c/w to trend  renal: BRIDGET possibly 2/2 hypoperfusion from shock, will c/w trend bmp, consider fluids  DVT: ?on coumadin, might be noncompliant, INR not elevated, daughter to bring home meds, c/w heparin for now  ID: c/w broad abx, stool for cdiff rejected, formed, f/u blood, urine cultures  Endo: ?addrenal insufficieny, r/o addisons, f/u random cortisol  Dispo: full code 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 Pt daughter, bp meds (BB, and Ca++ channel blocker and diuretic) recently increased by cardiology.    Neuro: intubated, no sedation, fentanyl prn for pain/vent synchrony, will add minimal precedex for mild sedation   Cardio: unclear etiology of ?cardiogenic shock, temp pacer in place 2/2 periods of asystole, pacer set at 50. On levo, continue to wean, BP/HR overall stabilizing, in/out of afib and sinus, trops likely reflected of demand,   pulm: vent support for now, SBT when stable  GI: npo for now, start TFs, ppi, f/u abdominal xr, elevated LFTs likely reflective of shock liver, will c/w to trend, d/c'd Po vanco, unclear etiology of diarrhea, f/u ID recs  renal: BRIDGET possibly 2/2 hypoperfusion from shock, will c/w trend bmp, renal US neg for obstructive nephropathy, f/u urine lytes  DVT: ?on coumadin, might be noncompliant, INR not elevated, daughter to bring home meds, c/w heparin for now  ID: c/w broad abx zosyn, f/u blood, urine cultures  Endo: ?addrenal insufficieny, r/o addisons, f/u random cortisol, TFTs, c/w steroids  Dispo: full code

## 2018-02-05 NOTE — DIETITIAN INITIAL EVALUATION ADULT. - PROBLEM SELECTOR PLAN 4
Non-anion gap, likely secondary to diarrhea  1. Continue Sodium bicarb gtt  2. Repeat ABG as per ICU

## 2018-02-05 NOTE — CONSULT NOTE ADULT - SUBJECTIVE AND OBJECTIVE BOX
[facility] Consultation    CHIEF COMPLAINT: Patient is a 82y old  Female who presents with a chief complaint of diarrhea  x 1 week, weakness (04 Feb 2018 23:35)      HPI:  82F PMHx Afib on Coumadin, HLD, CVA (1990's), MVP, OA, HTN, RA, PVD (s/p RLE bypassx3, most recently 2012), Neuropathy, hx OM BIBEMS from home with cc of diarrhea found to be bradycardic and hypotensive. Unable to obtain any history from patient as she was intubated.  As per daughters at bedside, patient was complaining of watery diarrhea for past couple weeks, which seemed to be resolving. Patient was doing well last they had spoken to her earlier this morning until about 3pm when aid felt patient seemed very weak and tired and called ambulance. Of note per daughters, patient had seen her cardiologist about 10 days prior, was fount to be hypertensive at which time at diuretic was added and two of her antihypertensive medication doses were increased.     In the ED, patient was found to be severely bradycardic with HR in 30's and hypotensive with blood pressure around 50-60 systolic and diastolic 30-40's. Received 3.5L NS, Zosyn x1, Calcium gluconate, Humalog, Zofran x1. EKG showed HR 40bpm with undetermined rhythm, septal infarct age undetermined. Patient was intubated and is s/p transcutaneous pacemaker placement by ICU in ED. Started on Dopamine and Levophed drip, admitted to ICU. 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  Osteoarthritis  Afib  PVD (peripheral vascular disease)  Neuropathy: (Right lower leg)  H/O cerebral aneurysm repair: brain clips  CVA (cerebral vascular accident): (&quot;Mini-stroke&quot;,1990&#x27;s)  Rheumatoid arthritis  Hyperlipidemia  Hypertension  S/P cataract surgery: (Left eye)  Elective surgery: (&quot;Twisted bowel&quot;, 2014)  Elective surgery: (Exision of cyst on liver, 1985)  S/P ORIF (open reduction internal fixation) fracture: Left hip fx (2012) &amp; R hip fx (2013)  H/O cerebral aneurysm repair: Brain clips (1978(  Renal stone: Cysto stent placement 10/1/2014  PVD (peripheral vascular disease): s/p RLE bypass x 3, most recent 3/2012 Right external iliac to PT bypass w/ PTFE (2012)  S/P FRED (total abdominal hysterectomy): (1987, Hx of &quot;ovarian cancer?&quot;)      SOCIAL HISTORY: former smoker    FAMILY HISTORY:  Family history of aneurysm      MEDICATIONS  (STANDING):  chlorhexidine 0.12% Liquid 15 milliLiter(s) Swish and Spit two times a day  heparin  Injectable 5000 Unit(s) SubCutaneous every 12 hours  hydrocortisone sodium succinate Injectable 50 milliGRAM(s) IV Push every 6 hours  norepinephrine Infusion 1 MICROgram(s)/kG/Min (92.044 mL/Hr) IV Continuous <Continuous>  pantoprazole  Injectable 40 milliGRAM(s) IV Push daily  piperacillin/tazobactam IVPB. 3.375 Gram(s) IV Intermittent every 12 hours  potassium chloride  20 mEq/100 mL IVPB 20 milliEquivalent(s) IV Intermittent every 2 hours  vancomycin    Solution 125 milliGRAM(s) Oral every 6 hours    MEDICATIONS  (PRN):  fentaNYL    Injectable 50 MICROGram(s) IV Push every 2 hours PRN Moderate Pain (4 - 6)      Allergies    No Known Allergies    Intolerances        REVIEW OF SYSTEMS: unobtainable    VITAL SIGNS:   Vital Signs Last 24 Hrs  T(C): 37.2 (05 Feb 2018 04:30), Max: 37.2 (05 Feb 2018 04:30)  T(F): 99 (05 Feb 2018 04:30), Max: 99 (05 Feb 2018 04:30)  HR: 74 (05 Feb 2018 08:30) (32 - 84)  BP: 141/67 (05 Feb 2018 08:30) (50/30 - 142/65)  BP(mean): 97 (05 Feb 2018 08:30) (69 - 97)  RR: 20 (05 Feb 2018 08:30) (14 - 29)  SpO2: 100% (05 Feb 2018 08:30) (90% - 100%)    I&O's Summary    04 Feb 2018 07:01  -  05 Feb 2018 07:00  --------------------------------------------------------  IN: 1844 mL / OUT: 150 mL / NET: 1694 mL    05 Feb 2018 07:01  -  05 Feb 2018 08:53  --------------------------------------------------------  IN: 72 mL / OUT: 25 mL / NET: 47 mL        PHYSICAL EXAM:    Constitutional: NAD, intubated  Eyes:   Pupils round, no lesions  ENMT: intubated  Pulmonary:  breath sounds are clear bilaterally, No wheezing, rales or rhonchi  Cardiovascular: PMI not palpable mei S1 and S2, no murmurs, rubs, gallops or clicks  Gastrointestinal: Bowel Sounds present, soft, nontender.   Lymph: No peripheral edema. No cervical lymphadenopathy.  Skin: No rashes. Changes of chronic venous stasis. No cyanosis.  Psych:  cannot assess    LABS: All Labs Reviewed:                        9.5    12.8  )-----------( 280      ( 05 Feb 2018 05:58 )             29.9                         9.9    12.5  )-----------( 260      ( 04 Feb 2018 18:25 )             32.0     05 Feb 2018 05:58    146    |  115    |  25     ----------------------------<  203    2.9     |  19     |  1.60   04 Feb 2018 19:31    145    |  117    |  24     ----------------------------<  106    3.9     |  17     |  1.20   04 Feb 2018 18:25    140    |  107    |  28     ----------------------------<  122    3.7     |  24     |  1.60     Ca    7.1        05 Feb 2018 05:58  Ca    6.7        04 Feb 2018 19:31  Ca    8.4        04 Feb 2018 18:25    TPro  5.6    /  Alb  2.5    /  TBili  0.6    /  DBili  x      /  AST  233    /  ALT  174    /  AlkPhos  125    05 Feb 2018 05:58  TPro  4.7    /  Alb  2.1    /  TBili  0.4    /  DBili  x      /  AST  80     /  ALT  64     /  AlkPhos  74     04 Feb 2018 19:31    PT/INR - ( 04 Feb 2018 18:56 )   PT: 11.6 sec;   INR: 1.06 ratio         PTT - ( 04 Feb 2018 18:56 )  PTT:29.2 sec  CARDIAC MARKERS ( 05 Feb 2018 07:03 )  .099 ng/mL / x     / x     / x     / x      CARDIAC MARKERS ( 04 Feb 2018 23:59 )  .085 ng/mL / x     / x     / x     / x      CARDIAC MARKERS ( 04 Feb 2018 19:31 )  <.015 ng/mL / x     / 51 U/L / x     / 1.7 ng/mL          02-05 @ 00:22  TSH: 1.45    Electrocardiography reveals atrial fibrillation with a ventricular response of approximately 40 beats per minute, poor R-wave progression, left axis deviation    < from: Xray Chest 1 View AP/PA (02.04.18 @ 20:25) >    EXAM:  XR CHEST AP OR PA 1V                            PROCEDURE DATE:  02/04/2018          INTERPRETATION:  Clinical information: Admission. Diarrhea and   respiratory distress.    Technique: Frontal view of the chest.    Comparison: Prior chest x-ray examination from 12/13/2017.    Findings: The patient is status post endotracheal tube intubation with   the tip just above the rachel. There is a right cordis sheath present   with a [place] catheter coursing through it, terminating at the right   ventricle.    There is mild pulmonary vascular congestion. The heart size is at the   upper limits of normal. The aorta is tortuous. There are mild multilevel   degenerative changes of the thoracic spine.    Impression: Intubated with mild pulmonary vascular congestion.                FOX FARIAS M.D., ATTENDING RADIOLOGIST  This document has been electronically signed. Feb 5 2018  8:07AM                < end of copied text >  < from: TTE Echo Doppler w/o Cont (11.04.17 @ 08:47) >     EXAM:  ECHO TTE W/O CON COMP W/DOPPLR         PROCEDURE DATE:  11/04/2017        INTERPRETATION:  INDICATION: aortic valve disease    Blood Pressure 167/83    Height 152     Weight 51       BSA 1.5    Dimensions:    LA 3.2       Normal Values: 2.0- 4.0 cm    Ao 3.0        Normal Values: 2.0 - 3.8 cm  SEPTUM 1.0       Normal Values: 0.6 - 1.2 cm  PWT 1.0       Normal Values: 0.6 - 1.1 cm  LVIDd 3.5         Normal Values: 3.0 - 5.6 cm  LVIDs 2.6         Normal Values: 1.8 - 4.0 cm    Derived Variables:  LVMI     g/m2  RWT      Fractional Short      Ejection Fraction 65    Doppler Peak v. AoV=   (m/sec)    OBSERVATIONS:    Mitral Valve: normal, 1+ MR.  Aortic Valve/Aorta: Calcified trileaflet aortic valve. Minimal AI  Tricuspid Valve: normalwith trace TR.  Pulmonic Valve: normal  Left Atrium: normal  Right Atrium: normal  Left Ventricle: Technically limited study. As visualized, there is normal   LV size and systolic function, estimated LVEF of 65%.  Right Ventricle: Not well visualized, probably normal  Pericardium/Pleura: no significant pleural effusion, no significant   pericardial effusion.  Pulmonary/RV Pressure: Inadequate TR jet to estimate PA systolic pressure.  LV Diastolic Function: Grade 1 diastolic dysfunction.     Technically limited study. As visualized, there is normal left   ventricular size and systolic function, estimated LVEF of 65%. The right   ventricle is not well seen, but is probably normal in size with normal   systolic function. Grade 1 diastolic dysfunction. Normal biatrial size.   The aortic root is normal in size. The mitral valve is structurally   normal, 1+ MR. The aortic valve is calcified without stenosis. There is   minimal aortic insufficiency. Trace physiologic TR. Inadequate TR jet to   estimate PA systolic pressure. No significant pericardial effusion.                  FREDI DALAL M.D., ATTENDING CARDIOLOGIST  This document has been electronically signed. Nov 4 2017  5:20PM                < end of copied text >

## 2018-02-05 NOTE — DIETITIAN INITIAL EVALUATION ADULT. - OTHER INFO
patient from home with diarrhea reported for 1 week PTA was eating and drinking PTA trying to eat rice and binding foods per family. spoke with MD tube feeding to start  order for nepro  suggest consider jevity 1.2 feeding at this time.

## 2018-02-05 NOTE — DIETITIAN INITIAL EVALUATION ADULT. - PROBLEM SELECTOR PLAN 5
rate controlled, currently bradycardic now s/p transcutaneous pacemaker   1. Continue coumadin as per ICU   2. Hold antiarrhythmics in setting of bradycardia

## 2018-02-05 NOTE — PROGRESS NOTE ADULT - SUBJECTIVE AND OBJECTIVE BOX
Interval events: Remains intubated, acidosis resolved, d/c off bicarb drip this am    Review of Systems:  Constitutional: no fever, chills, fatigue  Neuro: no headache, numbness, weakness  Resp: no cough, wheezing, shortness of breath  CVS: no chest pain, palpitations, leg swelling  GI: no abdominal pain, nausea, vomiting, diarrhea   : no dysuria, frequency, incontinence  Skin: no itching, burning, rashes, or lesions   Msk: no joint pain or swelling  Psych: no depression, anxiety    T(F): 99 (18 @ 04:30), Max: 99 (18 @ 04:30)  HR: 74 (18 @ 08:30) (32 - 84)  BP: 141/67 (18 @ 08:30) (50/30 - 142/65)  RR: 20 (18 @ 08:30) (14 - 29)  SpO2: 100% (18 @ 08:30) (90% - 100%)  Wt(kg): --    Mode: AC/ CMV (Assist Control/ Continuous Mandatory Ventilation), RR (machine): 20, TV (machine): 400, FiO2: 40, PEEP: 5    CAPILLARY BLOOD GLUCOSE        I&O's Summary    2018 07:  -  2018 07:00  --------------------------------------------------------  IN: 1844 mL / OUT: 150 mL / NET: 1694 mL    2018 07:  -  2018 08:56  --------------------------------------------------------  IN: 72 mL / OUT: 25 mL / NET: 47 mL        Physical Exam:     Gen:  Neuro:  HEENT:  CV:  Pulm:  GI:  Ext:  Skin:    Meds:  heparin  Injectable 5000 Unit(s) SubCutaneous every 12 hours    piperacillin/tazobactam IVPB. 3.375 Gram(s) IV Intermittent every 12 hours  vancomycin    Solution 125 milliGRAM(s) Oral every 6 hours    norepinephrine Infusion 1 MICROgram(s)/kG/Min IV Continuous <Continuous>    hydrocortisone sodium succinate Injectable 50 milliGRAM(s) IV Push every 6 hours      fentaNYL    Injectable 50 MICROGram(s) IV Push every 2 hours PRN    pantoprazole  Injectable 40 milliGRAM(s) IV Push daily        potassium chloride  20 mEq/100 mL IVPB 20 milliEquivalent(s) IV Intermittent every 2 hours      chlorhexidine 0.12% Liquid 15 milliLiter(s) Swish and Spit two times a day                                  9.5    12.8  )-----------( 280      ( 2018 05:58 )             29.9       02-05    146<H>  |  115<H>  |  25<H>  ----------------------------<  203<H>  2.9<LL>   |  19<L>  |  1.60<H>    Ca    7.1<L>      2018 05:58    TPro  5.6<L>  /  Alb  2.5<L>  /  TBili  0.6  /  DBili  x   /  AST  233<H>  /  ALT  174<H>  /  AlkPhos  125<H>  02    Lactate 2.3           - @ 05:58    Lactate 2.7           02-04 @ 23:59    Lactate 1.6           02- @ 18:56      CARDIAC MARKERS ( 2018 07:03 )  .099 ng/mL / x     / x     / x     / x      CARDIAC MARKERS ( 2018 23:59 )  .085 ng/mL / x     / x     / x     / x      CARDIAC MARKERS ( 2018 19:31 )  <.015 ng/mL / x     / 51 U/L / x     / 1.7 ng/mL      PT/INR - ( 2018 18:56 )   PT: 11.6 sec;   INR: 1.06 ratio         PTT - ( 2018 18:56 )  PTT:29.2 sec  Urinalysis Basic - ( 2018 01:02 )    Color: Yellow / Appearance: Turbid / S.010 / pH: x  Gluc: x / Ketone: Negative  / Bili: Negative / Urobili: Negative   Blood: x / Protein: 500 mg/dL / Nitrite: Negative   Leuk Esterase: Moderate / RBC: 11-25 /HPF / WBC 11-25   Sq Epi: x / Non Sq Epi: Moderate / Bacteria: TNTC          Rapid RVP Result: NotDetec ( @ 21:03)    ABG - ( 2018 06:45 )  pH: 7.39  /  pCO2: 31    /  pO2: 141   / HCO3: 20    / Base Excess: -6.0  /  SaO2: 99                  Radiology: ***    Bedside Lung U/S: ***    Bedside Cardiac U/S: ***    CENTRAL LINE: Y/N   DATE INSERTED:   REMOVE: Y/N    ISABEL: Y/N      DATE INSERTED:        REMOVE: Y/N    A-LINE: Y/N     DATE INSERTED:              REMOVE: Y/N    GLOBAL ISSUE/BEST PRACTICE:  Analgesia:  Sedation:  HOB elevation: yes  Stress ulcer prophylaxis:  VTE prophylaxis:  Glycemic control:  Nutrition:    CODE STATUS: *** Interval events: Remains intubated, acidosis resolved, d/c off bicarb drip this am, hypothermic around 10 pm yesterday, 93 degrees    Review of Systems: unable to obtain 2/2 mental status    T(F): 99 (18 @ 04:30), Max: 99 (18 @ 04:30)  HR: 74 (18 @ 08:30) (32 - 84)  BP: 141/67 (18 @ 08:30) (50/30 - 142/65)  RR: 20 (18 @ 08:30) (14 - 29)  SpO2: 100% (18 @ 08:30) (90% - 100%)  Wt(kg): --    Mode: AC/ CMV (Assist Control/ Continuous Mandatory Ventilation), RR (machine): 20, TV (machine): 400, FiO2: 40, PEEP: 5    CAPILLARY BLOOD GLUCOSE        I&O's Summary    2018 07:  -  2018 07:00  --------------------------------------------------------  IN: 1844 mL / OUT: 150 mL / NET: 1694 mL    2018 07:01  -  2018 08:56  --------------------------------------------------------  IN: 72 mL / OUT: 25 mL / NET: 47 mL        Physical Exam:     General: on vent, aware, responds to commands and questions  HEENT: NCAT, PERRLA  Neurology: A&Ox2, nonfocal  Respiratory: grossly cta  CV: RRR, soft S1/S2  Abdominal: Soft, NT, ND  Extremities: No C/C/E, + 2 peripheral pulses  Skin: warm, dry, normal color, perfusing well    Meds:  heparin  Injectable 5000 Unit(s) SubCutaneous every 12 hours    piperacillin/tazobactam IVPB. 3.375 Gram(s) IV Intermittent every 12 hours  vancomycin    Solution 125 milliGRAM(s) Oral every 6 hours    norepinephrine Infusion 1 MICROgram(s)/kG/Min IV Continuous <Continuous>    hydrocortisone sodium succinate Injectable 50 milliGRAM(s) IV Push every 6 hours      fentaNYL    Injectable 50 MICROGram(s) IV Push every 2 hours PRN    pantoprazole  Injectable 40 milliGRAM(s) IV Push daily        potassium chloride  20 mEq/100 mL IVPB 20 milliEquivalent(s) IV Intermittent every 2 hours      chlorhexidine 0.12% Liquid 15 milliLiter(s) Swish and Spit two times a day                                  9.5    12.8  )-----------( 280      ( 2018 05:58 )             29.9       02-05    146<H>  |  115<H>  |  25<H>  ----------------------------<  203<H>  2.9<LL>   |  19<L>  |  1.60<H>    Ca    7.1<L>      2018 05:58    TPro  5.6<L>  /  Alb  2.5<L>  /  TBili  0.6  /  DBili  x   /  AST  233<H>  /  ALT  174<H>  /  AlkPhos  125<H>  02-    Lactate 2.3           02-05 @ 05:58    Lactate 2.7           02- @ 23:59    Lactate 1.6           02-04 @ 18:56      CARDIAC MARKERS ( 2018 07:03 )  .099 ng/mL / x     / x     / x     / x      CARDIAC MARKERS ( 2018 23:59 )  .085 ng/mL / x     / x     / x     / x      CARDIAC MARKERS ( 2018 19:31 )  <.015 ng/mL / x     / 51 U/L / x     / 1.7 ng/mL      PT/INR - ( 2018 18:56 )   PT: 11.6 sec;   INR: 1.06 ratio         PTT - ( 2018 18:56 )  PTT:29.2 sec  Urinalysis Basic - ( 2018 01:02 )    Color: Yellow / Appearance: Turbid / S.010 / pH: x  Gluc: x / Ketone: Negative  / Bili: Negative / Urobili: Negative   Blood: x / Protein: 500 mg/dL / Nitrite: Negative   Leuk Esterase: Moderate / RBC: 11-25 /HPF / WBC 11-25   Sq Epi: x / Non Sq Epi: Moderate / Bacteria: TNTC          Rapid RVP Result: NotDetec ( @ 21:03)    ABG - ( 2018 06:45 )  pH: 7.39  /  pCO2: 31    /  pO2: 141   / HCO3: 20    / Base Excess: -6.0  /  SaO2: 99                  Radiology:    < from: US Renal (18 @ 12:58) >  Echogenic kidneys compatible with medical renal disease. No   hydronephrosis.    9 mm nonobstructing right-sided intrarenal stone off the lower pole of   the kidney.    Gallstones and nonspecific gallbladder wall thickening.     < end of copied text >    < from: Xray Abdomen 1 View PORTABLE -Routine (18 @ 11:48) >  Nonspecific bowel gas pattern.    < end of copied text >      CENTRAL LINE: Y, MICHAEL    ISABEL: Y    A-LINE:n    GLOBAL ISSUE/BEST PRACTICE:  Analgesia: y  Sedation: y  HOB elevation: yes  Stress ulcer prophylaxis: y  VTE prophylaxis: y  Glycemic control: n  Nutrition: TFs    CODE STATUS: Full code Interval events: Remains intubated, acidosis resolved, d/c off bicarb drip this am, hypothermic around 10 pm yesterday, 93 degrees    Review of Systems: unable to obtain 2/2 mental status    T(F): 99 (18 @ 04:30), Max: 99 (18 @ 04:30)  HR: 74 (18 @ 08:30) (32 - 84)  BP: 141/67 (18 @ 08:30) (50/30 - 142/65)  RR: 20 (18 @ 08:30) (14 - 29)  SpO2: 100% (18 @ 08:30) (90% - 100%)  Wt(kg): --    Mode: AC/ CMV (Assist Control/ Continuous Mandatory Ventilation), RR (machine): 20, TV (machine): 400, FiO2: 40, PEEP: 5      I&O's Summary    2018 07:  -  2018 07:00  --------------------------------------------------------  IN: 1844 mL / OUT: 150 mL / NET: 1694 mL    2018 07:01  -  2018 08:56  --------------------------------------------------------  IN: 72 mL / OUT: 25 mL / NET: 47 mL        Physical Exam:     General: on vent, aware, responds to commands and questions  HEENT: NCAT, PERRLA  Neurology: A&Ox2, nonfocal  Respiratory: grossly cta  CV: RRR, soft S1/S2  Abdominal: Soft, NT, ND  Extremities: No C/C/E, + 2 peripheral pulses  Skin: warm, dry, normal color, perfusing well    Meds:  heparin  Injectable 5000 Unit(s) SubCutaneous every 12 hours    piperacillin/tazobactam IVPB. 3.375 Gram(s) IV Intermittent every 12 hours  vancomycin    Solution 125 milliGRAM(s) Oral every 6 hours    norepinephrine Infusion 1 MICROgram(s)/kG/Min IV Continuous <Continuous>    hydrocortisone sodium succinate Injectable 50 milliGRAM(s) IV Push every 6 hours      fentaNYL    Injectable 50 MICROGram(s) IV Push every 2 hours PRN    pantoprazole  Injectable 40 milliGRAM(s) IV Push daily        potassium chloride  20 mEq/100 mL IVPB 20 milliEquivalent(s) IV Intermittent every 2 hours      chlorhexidine 0.12% Liquid 15 milliLiter(s) Swish and Spit two times a day                                  9.5    12.8  )-----------( 280      ( 2018 05:58 )             29.9       02-05    146<H>  |  115<H>  |  25<H>  ----------------------------<  203<H>  2.9<LL>   |  19<L>  |  1.60<H>    Ca    7.1<L>      2018 05:58    TPro  5.6<L>  /  Alb  2.5<L>  /  TBili  0.6  /  DBili  x   /  AST  233<H>  /  ALT  174<H>  /  AlkPhos  125<H>  02-    Lactate 2.3           02-05 @ 05:58    Lactate 2.7           02- @ 23:59    Lactate 1.6           02-04 @ 18:56      CARDIAC MARKERS ( 2018 07:03 )  .099 ng/mL / x     / x     / x     / x      CARDIAC MARKERS ( 2018 23:59 )  .085 ng/mL / x     / x     / x     / x      CARDIAC MARKERS ( 2018 19:31 )  <.015 ng/mL / x     / 51 U/L / x     / 1.7 ng/mL      PT/INR - ( 2018 18:56 )   PT: 11.6 sec;   INR: 1.06 ratio         PTT - ( 2018 18:56 )  PTT:29.2 sec  Urinalysis Basic - ( 2018 01:02 )    Color: Yellow / Appearance: Turbid / S.010 / pH: x  Gluc: x / Ketone: Negative  / Bili: Negative / Urobili: Negative   Blood: x / Protein: 500 mg/dL / Nitrite: Negative   Leuk Esterase: Moderate / RBC: 11-25 /HPF / WBC 11-25   Sq Epi: x / Non Sq Epi: Moderate / Bacteria: TNTC          Rapid RVP Result: NotDetec ( @ 21:03)    ABG - ( 2018 06:45 )  pH: 7.39  /  pCO2: 31    /  pO2: 141   / HCO3: 20    / Base Excess: -6.0  /  SaO2: 99                  Radiology:    < from: US Renal (18 @ 12:58) >  Echogenic kidneys compatible with medical renal disease. No   hydronephrosis.    9 mm nonobstructing right-sided intrarenal stone off the lower pole of   the kidney.    Gallstones and nonspecific gallbladder wall thickening.     < end of copied text >    < from: Xray Abdomen 1 View PORTABLE -Routine (18 @ 11:48) >  Nonspecific bowel gas pattern.    < end of copied text >      CENTRAL LINE: YMICHAEL    ISABEL: Y    A-LINE:n    GLOBAL ISSUE/BEST PRACTICE:  Analgesia: y  Sedation: y  HOB elevation: yes  Stress ulcer prophylaxis: y  VTE prophylaxis: y  Glycemic control: n  Nutrition: TFs    CODE STATUS: Full code

## 2018-02-05 NOTE — DIETITIAN INITIAL EVALUATION ADULT. - PROBLEM SELECTOR PLAN 2
s/p transcutaneous pacemaker in ED; unclear etiology- possibly secondary to recent addition of diuretic and increase in antihypertensive medication   1. transcutaneous pacemaker in place  2. Hold BB and CCB in setting of bradycardia and hypotension   3. Consult cardio: Dr. Laughlin

## 2018-02-05 NOTE — DIETITIAN INITIAL EVALUATION ADULT. - PROBLEM SELECTOR PLAN 10
IMPROVE VTE Individual Risk Assessment          RISK                                                          Points  [  ] Previous VTE                                                3  [  ] Thrombophilia                                             2  [  ] Lower limb paralysis                                   2        (unable to hold up >15 seconds)    [  ] Current Cancer                                             2         (within 6 months)  [  ] Immobilization > 24 hrs                              1  [  ] ICU/CCU stay > 24 hours                             1  [ x ] Age > 60                                                         1    IMPROVE VTE Score: 1    1. Heparin 5000u q12h dvt ppx   2. NPO   3. Intubated

## 2018-02-06 LAB
ALBUMIN SERPL ELPH-MCNC: 2.3 G/DL — LOW (ref 3.3–5)
ALP SERPL-CCNC: 106 U/L — SIGNIFICANT CHANGE UP (ref 40–120)
ALT FLD-CCNC: 112 U/L — HIGH (ref 12–78)
ANION GAP SERPL CALC-SCNC: 9 MMOL/L — SIGNIFICANT CHANGE UP (ref 5–17)
AST SERPL-CCNC: 85 U/L — HIGH (ref 15–37)
BASOPHILS # BLD AUTO: 0 K/UL — SIGNIFICANT CHANGE UP (ref 0–0.2)
BASOPHILS NFR BLD AUTO: 0.2 % — SIGNIFICANT CHANGE UP (ref 0–2)
BILIRUB SERPL-MCNC: 0.5 MG/DL — SIGNIFICANT CHANGE UP (ref 0.2–1.2)
BUN SERPL-MCNC: 24 MG/DL — HIGH (ref 7–23)
CALCIUM SERPL-MCNC: 7.6 MG/DL — LOW (ref 8.5–10.1)
CHLORIDE SERPL-SCNC: 117 MMOL/L — HIGH (ref 96–108)
CO2 SERPL-SCNC: 20 MMOL/L — LOW (ref 22–31)
CREAT SERPL-MCNC: 1.5 MG/DL — HIGH (ref 0.5–1.3)
EOSINOPHIL # BLD AUTO: 0 K/UL — SIGNIFICANT CHANGE UP (ref 0–0.5)
EOSINOPHIL NFR BLD AUTO: 0 % — SIGNIFICANT CHANGE UP (ref 0–6)
GLUCOSE SERPL-MCNC: 140 MG/DL — HIGH (ref 70–99)
HCT VFR BLD CALC: 28.7 % — LOW (ref 34.5–45)
HGB BLD-MCNC: 9.1 G/DL — LOW (ref 11.5–15.5)
LYMPHOCYTES # BLD AUTO: 0.8 K/UL — LOW (ref 1–3.3)
LYMPHOCYTES # BLD AUTO: 6.9 % — LOW (ref 13–44)
MAGNESIUM SERPL-MCNC: 2.1 MG/DL — SIGNIFICANT CHANGE UP (ref 1.6–2.6)
MCHC RBC-ENTMCNC: 27.9 PG — SIGNIFICANT CHANGE UP (ref 27–34)
MCHC RBC-ENTMCNC: 31.8 GM/DL — LOW (ref 32–36)
MCV RBC AUTO: 87.9 FL — SIGNIFICANT CHANGE UP (ref 80–100)
MONOCYTES # BLD AUTO: 0.4 K/UL — SIGNIFICANT CHANGE UP (ref 0–0.9)
MONOCYTES NFR BLD AUTO: 3.6 % — SIGNIFICANT CHANGE UP (ref 1–9)
NEUTROPHILS # BLD AUTO: 10.2 K/UL — HIGH (ref 1.8–7.4)
NEUTROPHILS NFR BLD AUTO: 89.3 % — HIGH (ref 43–77)
PHOSPHATE SERPL-MCNC: 3.7 MG/DL — SIGNIFICANT CHANGE UP (ref 2.5–4.5)
PLATELET # BLD AUTO: 166 K/UL — SIGNIFICANT CHANGE UP (ref 150–400)
POTASSIUM SERPL-MCNC: 3.8 MMOL/L — SIGNIFICANT CHANGE UP (ref 3.5–5.3)
POTASSIUM SERPL-SCNC: 3.8 MMOL/L — SIGNIFICANT CHANGE UP (ref 3.5–5.3)
PROT SERPL-MCNC: 5.4 G/DL — LOW (ref 6–8.3)
RBC # BLD: 3.27 M/UL — LOW (ref 3.8–5.2)
RBC # FLD: 14.8 % — HIGH (ref 10.3–14.5)
SODIUM SERPL-SCNC: 146 MMOL/L — HIGH (ref 135–145)
T3 SERPL-MCNC: 43 NG/DL — LOW (ref 80–200)
T4 AB SER-ACNC: 4.8 UG/DL — SIGNIFICANT CHANGE UP (ref 4.6–12)
TSH SERPL-MCNC: 1.27 UIU/ML — SIGNIFICANT CHANGE UP (ref 0.36–3.74)
WBC # BLD: 11.5 K/UL — HIGH (ref 3.8–10.5)
WBC # FLD AUTO: 11.5 K/UL — HIGH (ref 3.8–10.5)

## 2018-02-06 PROCEDURE — 99291 CRITICAL CARE FIRST HOUR: CPT

## 2018-02-06 RX ORDER — ENOXAPARIN SODIUM 100 MG/ML
50 INJECTION SUBCUTANEOUS DAILY
Qty: 0 | Refills: 0 | Status: DISCONTINUED | OUTPATIENT
Start: 2018-02-06 | End: 2018-02-10

## 2018-02-06 RX ORDER — LOSARTAN POTASSIUM 100 MG/1
1 TABLET, FILM COATED ORAL
Qty: 0 | Refills: 0 | COMMUNITY

## 2018-02-06 RX ORDER — METOPROLOL TARTRATE 50 MG
12.5 TABLET ORAL EVERY 8 HOURS
Qty: 0 | Refills: 0 | Status: DISCONTINUED | OUTPATIENT
Start: 2018-02-06 | End: 2018-02-07

## 2018-02-06 RX ORDER — ASPIRIN/CALCIUM CARB/MAGNESIUM 324 MG
81 TABLET ORAL DAILY
Qty: 0 | Refills: 0 | Status: DISCONTINUED | OUTPATIENT
Start: 2018-02-06 | End: 2018-02-12

## 2018-02-06 RX ORDER — ATORVASTATIN CALCIUM 80 MG/1
40 TABLET, FILM COATED ORAL AT BEDTIME
Qty: 0 | Refills: 0 | Status: DISCONTINUED | OUTPATIENT
Start: 2018-02-06 | End: 2018-02-12

## 2018-02-06 RX ORDER — HYDRALAZINE HCL 50 MG
25 TABLET ORAL EVERY 8 HOURS
Qty: 0 | Refills: 0 | Status: DISCONTINUED | OUTPATIENT
Start: 2018-02-06 | End: 2018-02-07

## 2018-02-06 RX ORDER — HALOPERIDOL DECANOATE 100 MG/ML
2 INJECTION INTRAMUSCULAR ONCE
Qty: 0 | Refills: 0 | Status: COMPLETED | OUTPATIENT
Start: 2018-02-06 | End: 2018-02-06

## 2018-02-06 RX ADMIN — HALOPERIDOL DECANOATE 2 MILLIGRAM(S): 100 INJECTION INTRAMUSCULAR at 15:23

## 2018-02-06 RX ADMIN — PIPERACILLIN AND TAZOBACTAM 25 GRAM(S): 4; .5 INJECTION, POWDER, LYOPHILIZED, FOR SOLUTION INTRAVENOUS at 18:59

## 2018-02-06 RX ADMIN — DEXMEDETOMIDINE HYDROCHLORIDE IN 0.9% SODIUM CHLORIDE 2.46 MICROGRAM(S)/KG/HR: 4 INJECTION INTRAVENOUS at 00:06

## 2018-02-06 RX ADMIN — Medication 50 MILLIGRAM(S): at 05:08

## 2018-02-06 RX ADMIN — HEPARIN SODIUM 5000 UNIT(S): 5000 INJECTION INTRAVENOUS; SUBCUTANEOUS at 14:55

## 2018-02-06 RX ADMIN — HEPARIN SODIUM 5000 UNIT(S): 5000 INJECTION INTRAVENOUS; SUBCUTANEOUS at 05:08

## 2018-02-06 RX ADMIN — PIPERACILLIN AND TAZOBACTAM 25 GRAM(S): 4; .5 INJECTION, POWDER, LYOPHILIZED, FOR SOLUTION INTRAVENOUS at 05:08

## 2018-02-06 RX ADMIN — Medication 81 MILLIGRAM(S): at 12:51

## 2018-02-06 RX ADMIN — DEXMEDETOMIDINE HYDROCHLORIDE IN 0.9% SODIUM CHLORIDE 2.46 MICROGRAM(S)/KG/HR: 4 INJECTION INTRAVENOUS at 05:21

## 2018-02-06 RX ADMIN — FENTANYL CITRATE 25 MICROGRAM(S): 50 INJECTION INTRAVENOUS at 04:46

## 2018-02-06 RX ADMIN — CHLORHEXIDINE GLUCONATE 15 MILLILITER(S): 213 SOLUTION TOPICAL at 05:07

## 2018-02-06 RX ADMIN — PANTOPRAZOLE SODIUM 40 MILLIGRAM(S): 20 TABLET, DELAYED RELEASE ORAL at 12:51

## 2018-02-06 NOTE — PROGRESS NOTE ADULT - ATTENDING COMMENTS
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 Now extubated today, resolved acidosis, bradycardia, and hypotension.    - Mild delirium in the setting of ICU stay/sedatives, avoid opioids/BZ's, s/p Haldol prn  - HD stable, restart low dose BB, start hydralazine for HTN, restart statin, asa; hold arb given BRIDGET  - D/C RIJ introducer, temporary pacer turned off  - Passed CPAP, extubated to NC, doing well  - Speech and swallow eval, diet as tolerated  - Mild BRIDGET, likely hemodynamic-mediated in the setting of bradycardia/brief asystole, continue to monitor, stable lytes  - Continue Zosyn for sepsis of unclear etiology, has E.coli in urine, otherwise cultures negative  - Resolved diarrhea  - Resolving mild transaminitis  - DVT ppx, patient supposedly on warfarin PTA but INR is 1.0 which is not consistent with warfarin use (?adherence). Start therapeutic lovenox (1 mg/kg qd) until decision to restart warfarin  - Discussed with patient and family at bedside, full code  CC time spent: 40 min

## 2018-02-06 NOTE — PROGRESS NOTE ADULT - SUBJECTIVE AND OBJECTIVE BOX
Interval events: no overnight events    ICU Vital Signs Last 24 Hrs  T(C): 36.2 (2018 04:36), Max: 37.6 (2018 09:15)  T(F): 97.2 (2018 04:36), Max: 99.6 (2018 09:15)  HR: 69 (2018 08:05) (54 - 91)  BP: 151/74 (2018 07:00) (103/60 - 165/86)  BP(mean): 106 (2018 07:00) (76 - 119)  ABP: --  ABP(mean): --  RR: 10 (2018 07:00) (10 - 35)  SpO2: 96% (2018 08:05) (96% - 100%)      Mode: CPAP with PS  FiO2: 30  PEEP: 5  PS: 5  MAP: 7.6      I&O's Summary    2018 07:01  -  2018 07:00  --------------------------------------------------------  IN: 1235.9 mL / OUT: 1100 mL / NET: 135.9 mL      Physical Exam:     General: on vent, aware, responds to commands and questions, nad  HEENT: NCAT, PERRLA  Neurology: A&Ox3, nonfocal  Respiratory: grossly cta  CV: RRR, soft S1/S2  Abdominal: Soft, NT, ND  Extremities: No C/C/E, + 2 peripheral pulses  Skin: warm, dry, normal color, perfusing well        MEDICATIONS  (STANDING):  chlorhexidine 0.12% Liquid 15 milliLiter(s) Swish and Spit two times a day  dexmedetomidine Infusion 0.2 MICROgram(s)/kG/Hr (2.455 mL/Hr) IV Continuous <Continuous>  heparin  Injectable 5000 Unit(s) SubCutaneous every 8 hours  hydrocortisone sodium succinate Injectable 50 milliGRAM(s) IV Push every 6 hours  norepinephrine Infusion 1 MICROgram(s)/kG/Min (92.044 mL/Hr) IV Continuous <Continuous>  pantoprazole  Injectable 40 milliGRAM(s) IV Push daily  piperacillin/tazobactam IVPB. 3.375 Gram(s) IV Intermittent every 12 hours    MEDICATIONS  (PRN):  fentaNYL    Injectable 50 MICROGram(s) IV Push every 2 hours PRN Moderate Pain (4 - 6)  fentaNYL    Injectable 25 MICROGram(s) IV Push every 2 hours PRN agiation      Labs                        9.1    11.5  )-----------( 166      ( 2018 06:04 )             28.7     2018 06:04    146    |  117    |  24     ----------------------------<  140    3.8     |  20     |  1.50     Ca    7.6        2018 06:04  Phos  3.7       2018 06:04  Mg     2.1       2018 06:04    TPro  5.4    /  Alb  2.3    /  TBili  0.5    /  DBili  x      /  AST  85     /  ALT  112    /  AlkPhos  106    2018 06:04    LIVER FUNCTIONS - ( 2018 06:04 )  Alb: 2.3 g/dL / Pro: 5.4 g/dL / ALK PHOS: 106 U/L / ALT: 112 U/L / AST: 85 U/L / GGT: x           PT/INR - ( 2018 18:56 )   PT: 11.6 sec;   INR: 1.06 ratio         PTT - ( 2018 18:56 )  PTT:29.2 sec  CAPILLARY BLOOD GLUCOSE        CARDIAC MARKERS ( 2018 18:44 )  .035 ng/mL / x     / 43 U/L / x     / 1.4 ng/mL  CARDIAC MARKERS ( 2018 07:03 )  .099 ng/mL / x     / x     / x     / x      CARDIAC MARKERS ( 2018 23:59 )  .085 ng/mL / x     / x     / x     / x      CARDIAC MARKERS ( 2018 19:31 )  <.015 ng/mL / x     / 51 U/L / x     / 1.7 ng/mL      Urinalysis Basic - ( 2018 01:02 )    Color: Yellow / Appearance: Turbid / S.010 / pH: x  Gluc: x / Ketone: Negative  / Bili: Negative / Urobili: Negative   Blood: x / Protein: 500 mg/dL / Nitrite: Negative   Leuk Esterase: Moderate / RBC: 11-25 /HPF / WBC 11-25   Sq Epi: x / Non Sq Epi: Moderate / Bacteria: TNTC                Rapid RVP Result: NotDetec ( @ 21:03)    ABG - ( 2018 06:45 )  pH: 7.39  /  pCO2: 31    /  pO2: 141   / HCO3: 20    / Base Excess: -6.0  /  SaO2: 99                  Radiology:    < from: US Renal (18 @ 12:58) >  Echogenic kidneys compatible with medical renal disease. No   hydronephrosis.    9 mm nonobstructing right-sided intrarenal stone off the lower pole of   the kidney.    Gallstones and nonspecific gallbladder wall thickening.     < end of copied text >    < from: Xray Abdomen 1 View PORTABLE -Routine (18 @ 11:48) >  Nonspecific bowel gas pattern.    < end of copied text >      CENTRAL LINE: YMICHAEL    ISABEL: Y    A-LINE:n    GLOBAL ISSUE/BEST PRACTICE:  Analgesia: y  Sedation: y  HOB elevation: yes  Stress ulcer prophylaxis: y  VTE prophylaxis: y  Glycemic control: n  Nutrition: TFs    CODE STATUS: Full code

## 2018-02-06 NOTE — PROGRESS NOTE ADULT - SUBJECTIVE AND OBJECTIVE BOX
[facility] -- Dahiana Bañuelos, Toney Horner Pannella, Patel, Savella  Office # [phone]      Follow Up:  AF, HTN    Subjective/Observations: Patient seen and examined. Events noted. Resting  in bed. Intubated. No TVP pacing overnight.       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  Osteoarthritis  Afib  PVD (peripheral vascular disease)  Neuropathy: (Right lower leg)  H/O cerebral aneurysm repair: brain clips  CVA (cerebral vascular accident): (&quot;Mini-stroke&quot;,1990&#x27;s)  Rheumatoid arthritis  Hyperlipidemia  Hypertension  S/P cataract surgery: (Left eye)  Elective surgery: (&quot;Twisted bowel&quot;, 2014)  Elective surgery: (Exision of cyst on liver, 1985)  S/P ORIF (open reduction internal fixation) fracture: Left hip fx (2012) &amp; R hip fx (2013)  H/O cerebral aneurysm repair: Brain clips (1978(  Renal stone: Cysto stent placement 10/1/2014  PVD (peripheral vascular disease): s/p RLE bypass x 3, most recent 3/2012 Right external iliac to PT bypass w/ PTFE (2012)  S/P FRED (total abdominal hysterectomy): (1987, Hx of &quot;ovarian cancer?&quot;)      MEDICATIONS  (STANDING):  chlorhexidine 0.12% Liquid 15 milliLiter(s) Swish and Spit two times a day  dexmedetomidine Infusion 0.2 MICROgram(s)/kG/Hr (2.455 mL/Hr) IV Continuous <Continuous>  heparin  Injectable 5000 Unit(s) SubCutaneous every 8 hours  hydrocortisone sodium succinate Injectable 50 milliGRAM(s) IV Push every 6 hours  norepinephrine Infusion 1 MICROgram(s)/kG/Min (92.044 mL/Hr) IV Continuous <Continuous>  pantoprazole  Injectable 40 milliGRAM(s) IV Push daily  piperacillin/tazobactam IVPB. 3.375 Gram(s) IV Intermittent every 12 hours    MEDICATIONS  (PRN):  fentaNYL    Injectable 50 MICROGram(s) IV Push every 2 hours PRN Moderate Pain (4 - 6)  fentaNYL    Injectable 25 MICROGram(s) IV Push every 2 hours PRN agiation      Allergies    No Known Allergies    Intolerances            Vital Signs Last 24 Hrs  T(C): 36.2 (06 Feb 2018 04:36), Max: 37.6 (05 Feb 2018 09:15)  T(F): 97.2 (06 Feb 2018 04:36), Max: 99.6 (05 Feb 2018 09:15)  HR: 69 (06 Feb 2018 08:05) (54 - 91)  BP: 151/74 (06 Feb 2018 07:00) (103/60 - 165/86)  BP(mean): 106 (06 Feb 2018 07:00) (76 - 119)  RR: 10 (06 Feb 2018 07:00) (10 - 35)  SpO2: 96% (06 Feb 2018 08:05) (96% - 100%)    I&O's Summary    05 Feb 2018 07:01  -  06 Feb 2018 07:00  --------------------------------------------------------  IN: 1235.9 mL / OUT: 1100 mL / NET: 135.9 mL          PHYSICAL EXAM:  TELE: SR  Constitutional: sedated  HEENT: ETT  Pulmonary: Decreased breath sounds b/l. No rales, crackles or wheeze appreciated.   Cardiovascular: Regular, S1 and S2, distant  Gastrointestinal: Bowel Sounds present, soft, nontender.   Lymph: No peripheral edema. No lymphadenopathy.  Skin: No visible rashes or ulcers.  Psych:  unable to assess    LABS: All Labs Reviewed:                        9.1    11.5  )-----------( 166      ( 06 Feb 2018 06:04 )             28.7                         9.5    12.8  )-----------( 280      ( 05 Feb 2018 05:58 )             29.9                         9.9    12.5  )-----------( 260      ( 04 Feb 2018 18:25 )             32.0     06 Feb 2018 06:04    146    |  117    |  24     ----------------------------<  140    3.8     |  20     |  1.50   05 Feb 2018 18:44    146    |  117    |  21     ----------------------------<  130    3.9     |  19     |  1.50   05 Feb 2018 05:58    146    |  115    |  25     ----------------------------<  203    2.9     |  19     |  1.60     Ca    7.6        06 Feb 2018 06:04  Ca    7.4        05 Feb 2018 18:44  Ca    7.1        05 Feb 2018 05:58  Phos  3.7       06 Feb 2018 06:04  Phos  2.2       05 Feb 2018 05:58  Mg     2.1       06 Feb 2018 06:04  Mg     1.6       05 Feb 2018 05:58    TPro  5.4    /  Alb  2.3    /  TBili  0.5    /  DBili  x      /  AST  85     /  ALT  112    /  AlkPhos  106    06 Feb 2018 06:04  TPro  5.6    /  Alb  2.5    /  TBili  0.6    /  DBili  x      /  AST  233    /  ALT  174    /  AlkPhos  125    05 Feb 2018 05:58  TPro  4.7    /  Alb  2.1    /  TBili  0.4    /  DBili  x      /  AST  80     /  ALT  64     /  AlkPhos  74     04 Feb 2018 19:31    PT/INR - ( 04 Feb 2018 18:56 )   PT: 11.6 sec;   INR: 1.06 ratio         PTT - ( 04 Feb 2018 18:56 )  PTT:29.2 sec  CARDIAC MARKERS ( 05 Feb 2018 18:44 )  .035 ng/mL / x     / 43 U/L / x     / 1.4 ng/mL  CARDIAC MARKERS ( 05 Feb 2018 07:03 )  .099 ng/mL / x     / x     / x     / x      CARDIAC MARKERS ( 04 Feb 2018 23:59 )  .085 ng/mL / x     / x     / x     / x      CARDIAC MARKERS ( 04 Feb 2018 19:31 )  <.015 ng/mL / x     / 51 U/L / x     / 1.7 ng/mL

## 2018-02-06 NOTE — PROGRESS NOTE ADULT - ASSESSMENT
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 Pt daughter, bp meds (BB, and Ca++ channel blocker and diuretic) recently increased by cardiology.    Neuro: off precedex, mentating well, fentanyl prn for pain/vent synchrony  Cardio: bradycardia with unclear etiology of ?cardiogenic v septic v hypovolemic shock, bradycardia likely due to severe acidemia, now resolved, temp tracer off, SR in 70s. Off levo, HD overall stable, trops likely reflected of demand, f/u echo  pulm: tolerating SBT well, will extubate today   GI: npo for now, TFs unstill extubated, ppi, abdominal xr unremarkable, LFTs likely reflective of shock liver trending down, unclear etiology of diarrhea, will send cdiff if diarrhea persists  renal: BRIDGET possibly 2/2 hypoperfusion from shock, will c/w trend bmp, renal US neg for obstructive nephropathy, f/u urine lytes,   DVT ppx: heparin   ID: c/w broad abx zosyn day 2, f/u blood, urine:  cul e.coli  Endo: ?addrenal insufficieny, r/o addisons, f/u random cortisol, TFTs, c/w steroids  Dispo: full code 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 Pt daughter, bp meds (BB, and Ca++ channel blocker and diuretic) recently increased by cardiology.    Neuro: off precedex, mentating well, d/c'd fentanyl now off vent  Cardio: bradycardia with unclear etiology of ?cardiogenic v septic v hypovolemic shock, bradycardia likely due to severe acidemia, now resolved, temp pacer removed, SR in 70s. Off levo, HD overall stable, trops likely reflected of demand, f/u echo  pulm: tolerating SBT well, will extubate today   GI: npo for now pending speech/swallow, ppi, abdominal xr unremarkable, LFTs likely reflective of shock liver trending down, unclear etiology of diarrhea, will send cdiff if diarrhea persists  renal: BRIDGET possibly 2/2 hypoperfusion from shock, will c/w trend bmp, renal US neg for obstructive nephropathy, f/u urine lytes,   DVT ppx: heparin   ID: c/w broad abx zosyn day 3/5, f/u blood, urine:  cul e.coli  Endo: ?addrenal insufficieny, r/o addisons, f/u random cortisol, TFTs, d/c stress steroids  Dispo: full code  PT alexia

## 2018-02-06 NOTE — PROGRESS NOTE ADULT - ASSESSMENT
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 Bradycardia was likely in setting of metabolic derrangements.   - Currently maintaining SR. I suspect will need to resume AVN blockers in near future given that when in AF rates difficult to control. If necessary may be a good time to start Amio if LFTs improve and HR stable.   - Wean from vent  - check echo to assess MR. Has high risk of flash pulm edema  - no signs of HF.   - currently off of pressors.   - Try to maintain MAP >65 to allow for most perfusion to lower ext  - resume Hep gtt.   - Monitor and replete electrolytes. Keep K>4.0 and Mg>2.0.   - Further cardiac workup will depend on clinical course.   Patient at high risk for decompensation. Critically ill. >35 minutes of critical care time was spent with this patient.

## 2018-02-06 NOTE — PROGRESS NOTE ADULT - SUBJECTIVE AND OBJECTIVE BOX
CARITO CONSTANTINO is a 82yFemale , patient examined and chart reviewed. Patient being followed for possible severe hypovolemic shock vs sepsis    INTERVAL HPI/ OVERNIGHT EVENTS: Events noted, doing well, extubated ,off pressors . Still has chaudhary and right IJ transducer. She is alert and awake . Afebrile . 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  Osteoarthritis  Afib  PVD (peripheral vascular disease)  Neuropathy: (Right lower leg)  H/O cerebral aneurysm repair: brain clips  CVA (cerebral vascular accident): (&quot;Mini-stroke&quot;,&#x27;s)  Rheumatoid arthritis  Hyperlipidemia  Hypertension  S/P cataract surgery: (Left eye)  Elective surgery: (&quot;Twisted bowel&quot;, )  Elective surgery: (Exision of cyst on liver, )  S/P ORIF (open reduction internal fixation) fracture: Left hip fx () &amp; R hip fx ()  H/O cerebral aneurysm repair: Brain clips ((  Renal stone: Cysto stent placement 10/1/2014  PVD (peripheral vascular disease): s/p RLE bypass x 3, most recent 3/2012 Right external iliac to PT bypass w/ PTFE ()  S/P FRED (total abdominal hysterectomy): (, Hx of &quot;ovarian cancer?&quot;)      For details regarding the patient's social history, family history, and other miscellaneous elements, please refer the initial infectious diseases consultation and/or the admitting history and physical examination for this admission.      ROS:  CONSTITUTIONAL:  Negative fever or chills, feels well, good appetite  EYES:  Negative  blurry vision or double vision  CARDIOVASCULAR:  Negative for chest pain or palpitations  RESPIRATORY:  Negative for cough, wheezing, or SOB   GASTROINTESTINAL:  Negative for nausea, vomiting, diarrhea, constipation, or abdominal pain  GENITOURINARY:  Negative frequency, urgency , dysuria or hematuria   NEUROLOGIC:  No headache, confusion, dizziness, lightheadedness  All other systems were reviewed and are negative     Allergies:      Current inpatient medications :    ANTIBIOTICS/RELEVANT:  piperacillin/tazobactam IVPB. 3.375 Gram(s) IV Intermittent every 12 hours      aspirin enteric coated 81 milliGRAM(s) Oral daily  atorvastatin 40 milliGRAM(s) Oral at bedtime  chlorhexidine 0.12% Liquid 15 milliLiter(s) Swish and Spit two times a day  heparin  Injectable 5000 Unit(s) SubCutaneous every 8 hours  pantoprazole  Injectable 40 milliGRAM(s) IV Push daily      Objective:     @ 07:01  -   @ 07:00  --------------------------------------------------------  IN: 1235.9 mL / OUT: 1100 mL / NET: 135.9 mL     @ 07:01  -   @ 13:41  --------------------------------------------------------  IN: 0 mL / OUT: 175 mL / NET: -175 mL      T(C): 36.4 (18 @ 12:15), Max: 36.4 (18 @ 12:15)  HR: 82 (18 @ 13:00) (54 - 91)  BP: 167/76 (18 @ 13:00) (103/60 - 173/76)  RR: 15 (18 @ 13:00) (10 - 35)  SpO2: 99% (18 @ 13:00) (95% - 100%)  Wt(kg): --  Mode: CPAP with PS, FiO2: 30, PEEP: 5, PS: 5, MAP: 7.6    Physical Exam:  GEN: NAD, pleasant  HEENT: normocephalic and atraumatic. EOMI. GINI. Moist mucosa. Clear Posterior pharynx.  NECK: Supple. No carotid bruits.  No lymphadenopathy or thyromegaly. right IJ transducer +  LUNGS: decreased breath sounds   HEART: Regular rate and rhythm without murmur.  ABDOMEN: Soft, nontender, and nondistended.  Positive bowel sounds.  No hepatosplenomegaly was noted.  EXTREMITIES: Without any cyanosis, clubbing, rash, lesions or edema.  NEUROLOGIC: A & O x3, No focal neurological deficits   SKIN: No ulceration or induration present.      LABS:                        9.1    11.5  )-----------( 166      ( 2018 06:04 )             28.7       02-06    146<H>  |  117<H>  |  24<H>  ----------------------------<  140<H>  3.8   |  20<L>  |  1.50<H>    Ca    7.6<L>      2018 06:04  Phos  3.7     02-06  Mg     2.1     02-06    TPro  5.4<L>  /  Alb  2.3<L>  /  TBili  0.5  /  DBili  x   /  AST  85<H>  /  ALT  112<H>  /  AlkPhos  106  02-06      PT/INR - ( 2018 18:56 )   PT: 11.6 sec;   INR: 1.06 ratio         PTT - ( 2018 18:56 )  PTT:29.2 sec  Urinalysis Basic - ( 2018 01:02 )    Color: Yellow / Appearance: Turbid / S.010 / pH: x  Gluc: x / Ketone: Negative  / Bili: Negative / Urobili: Negative   Blood: x / Protein: 500 mg/dL / Nitrite: Negative   Leuk Esterase: Moderate / RBC: 11-25 /HPF / WBC 11-25   Sq Epi: x / Non Sq Epi: Moderate / Bacteria: TNTC      ABG - ( 2018 06:45 )  pH: 7.39  /  pCO2: 31    /  pO2: 141   / HCO3: 20    / Base Excess: -6.0  /  SaO2: 99          Procalcitonin, Serum (18 @ 23:59)    Procalcitonin, Serum: 0.30:       RECENT CULTURES:    Culture - Urine (collected 2018 08:17)  Source: .Urine Catheterized  Preliminary Report (2018 07:12):    10,000 - 49,000 CFU/mL Escherichia coli    Culture - Blood (collected 2018 00:36)  Source: .Blood Blood-Peripheral  Preliminary Report (2018 01:03):    No growth to date.    Culture - Blood (collected 2018 00:36)  Source: .Blood Blood-Peripheral  Preliminary Report (2018 01:03):    No growth to date.      RADIOLOGY & ADDITIONAL STUDIES:  Xray Abdomen 1 View PORTABLE -Routine (18 @ 11:48) >    Findings: There is a nonspecific bowel gas pattern. An NG tube is noted   with the tip in the stomach. A common bile duct stent is noted. Metallic   surgical clips are notable within the left hemipelvis.    Right upper quadrant calcified gallstones are noted. Surgical suture   material is notable within the right hemiabdomen.    Multilevel degenerative changes are noted within the imaged potions of   the spine. The patient is status post gamma nail and intramedullaryrod   fixation of the bilateral proximal femurs.    Impression: Nonspecific bowel gas pattern.  US Renal (18 @ 12:58) >    IMPRESSION: Echogenic kidneys compatible with medical renal disease. No   hydronephrosis.    9 mm nonobstructing right-sided intrarenal stone off the lower pole of   the kidney.    Gallstones and nonspecific gallbladder wall thickening. If there is   clinical suspicion of acute cholecystitis, recommend furtherevaluation   with HIDA scan.      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  She is at high risk for CDAD as she was on IV antibiotics 6 weeks ago but her stool was formed .    She most ly likely had severe electrolyte imbalance and hypotension due to recently increased doses of the cardiac meds and addition of diuretics and her intake was poor compounded by diarrhea . She has no clinical signs to suggest infectious etiology of shock .     Patient appears depressed, wants to stop all treatments.     Plan :   - consider dc chaudhary and right IJ transducer   - if diarrhea persists may need GI work up, send stool for wbc and occult blood   - continue with IV Zosyn pending cs results, if cs negative then dc antibiotics   - correct electrolyte imbalance  - patient seen by nephrology and cardiology     Continue with present regime .  Appropriate use of antibiotics and adverse effects reviewed.    I have discussed the above plan of care with patient and her  family in detail. They expressed understanding of the the treatment plan . Risks, benefits and alternatives discussed in detail. I have asked if they have any questions or concerns and appropriately addressed them to the best of my ability .      Critical care time greater then 35 minutes reviewing notes, labs data/ imaging , discussion with multidisciplinary team.    Thank you for allowing me to participate in care of your patient .        Judi Conye MD  [phone]

## 2018-02-06 NOTE — PHYSICAL THERAPY INITIAL EVALUATION ADULT - PERTINENT HX OF CURRENT PROBLEM, REHAB EVAL
82F PMHx Afib on Coumadin, HLD, CVA (1990's), MVP, OA, HTN, RA, PVD (s/p RLE bypassx3, most recently 2012), Neuropathy, hx OM BIB EMS from home with cc of diarrhea found to be bradycardic and hypotensive.

## 2018-02-07 ENCOUNTER — TRANSCRIPTION ENCOUNTER (OUTPATIENT)
Age: 83
End: 2018-02-07

## 2018-02-07 DIAGNOSIS — R82.71 BACTERIURIA: ICD-10-CM

## 2018-02-07 DIAGNOSIS — D72.829 ELEVATED WHITE BLOOD CELL COUNT, UNSPECIFIED: ICD-10-CM

## 2018-02-07 LAB
-  AMIKACIN: SIGNIFICANT CHANGE UP
-  AMPICILLIN/SULBACTAM: SIGNIFICANT CHANGE UP
-  AMPICILLIN: SIGNIFICANT CHANGE UP
-  AZTREONAM: SIGNIFICANT CHANGE UP
-  CEFAZOLIN: SIGNIFICANT CHANGE UP
-  CEFEPIME: SIGNIFICANT CHANGE UP
-  CEFOXITIN: SIGNIFICANT CHANGE UP
-  CEFTAZIDIME: SIGNIFICANT CHANGE UP
-  CEFTRIAXONE: SIGNIFICANT CHANGE UP
-  CIPROFLOXACIN: SIGNIFICANT CHANGE UP
-  ERTAPENEM: SIGNIFICANT CHANGE UP
-  GENTAMICIN: SIGNIFICANT CHANGE UP
-  IMIPENEM: SIGNIFICANT CHANGE UP
-  LEVOFLOXACIN: SIGNIFICANT CHANGE UP
-  MEROPENEM: SIGNIFICANT CHANGE UP
-  NITROFURANTOIN: SIGNIFICANT CHANGE UP
-  PIPERACILLIN/TAZOBACTAM: SIGNIFICANT CHANGE UP
-  TOBRAMYCIN: SIGNIFICANT CHANGE UP
-  TRIMETHOPRIM/SULFAMETHOXAZOLE: SIGNIFICANT CHANGE UP
ALBUMIN SERPL ELPH-MCNC: 2.3 G/DL — LOW (ref 3.3–5)
ALP SERPL-CCNC: 86 U/L — SIGNIFICANT CHANGE UP (ref 40–120)
ALT FLD-CCNC: 85 U/L — HIGH (ref 12–78)
ANION GAP SERPL CALC-SCNC: 8 MMOL/L — SIGNIFICANT CHANGE UP (ref 5–17)
AST SERPL-CCNC: 50 U/L — HIGH (ref 15–37)
BASOPHILS # BLD AUTO: 0.1 K/UL — SIGNIFICANT CHANGE UP (ref 0–0.2)
BASOPHILS NFR BLD AUTO: 0.6 % — SIGNIFICANT CHANGE UP (ref 0–2)
BILIRUB SERPL-MCNC: 0.5 MG/DL — SIGNIFICANT CHANGE UP (ref 0.2–1.2)
BUN SERPL-MCNC: 28 MG/DL — HIGH (ref 7–23)
CALCIUM SERPL-MCNC: 7.9 MG/DL — LOW (ref 8.5–10.1)
CHLORIDE SERPL-SCNC: 116 MMOL/L — HIGH (ref 96–108)
CO2 SERPL-SCNC: 24 MMOL/L — SIGNIFICANT CHANGE UP (ref 22–31)
CREAT SERPL-MCNC: 1.4 MG/DL — HIGH (ref 0.5–1.3)
CULTURE RESULTS: SIGNIFICANT CHANGE UP
EOSINOPHIL # BLD AUTO: 0 K/UL — SIGNIFICANT CHANGE UP (ref 0–0.5)
EOSINOPHIL NFR BLD AUTO: 0.3 % — SIGNIFICANT CHANGE UP (ref 0–6)
GLUCOSE SERPL-MCNC: 86 MG/DL — SIGNIFICANT CHANGE UP (ref 70–99)
HCT VFR BLD CALC: 27.5 % — LOW (ref 34.5–45)
HGB BLD-MCNC: 8.8 G/DL — LOW (ref 11.5–15.5)
INR BLD: 1.16 RATIO — SIGNIFICANT CHANGE UP (ref 0.88–1.16)
LYMPHOCYTES # BLD AUTO: 1.9 K/UL — SIGNIFICANT CHANGE UP (ref 1–3.3)
LYMPHOCYTES # BLD AUTO: 16.8 % — SIGNIFICANT CHANGE UP (ref 13–44)
MAGNESIUM SERPL-MCNC: 1.9 MG/DL — SIGNIFICANT CHANGE UP (ref 1.6–2.6)
MCHC RBC-ENTMCNC: 28.5 PG — SIGNIFICANT CHANGE UP (ref 27–34)
MCHC RBC-ENTMCNC: 32 GM/DL — SIGNIFICANT CHANGE UP (ref 32–36)
MCV RBC AUTO: 89 FL — SIGNIFICANT CHANGE UP (ref 80–100)
METHOD TYPE: SIGNIFICANT CHANGE UP
MONOCYTES # BLD AUTO: 0.8 K/UL — SIGNIFICANT CHANGE UP (ref 0–0.9)
MONOCYTES NFR BLD AUTO: 7.1 % — SIGNIFICANT CHANGE UP (ref 1–9)
NEUTROPHILS # BLD AUTO: 8.5 K/UL — HIGH (ref 1.8–7.4)
NEUTROPHILS NFR BLD AUTO: 75.1 % — SIGNIFICANT CHANGE UP (ref 43–77)
ORGANISM # SPEC MICROSCOPIC CNT: SIGNIFICANT CHANGE UP
ORGANISM # SPEC MICROSCOPIC CNT: SIGNIFICANT CHANGE UP
PHOSPHATE SERPL-MCNC: 2.4 MG/DL — LOW (ref 2.5–4.5)
PLATELET # BLD AUTO: 162 K/UL — SIGNIFICANT CHANGE UP (ref 150–400)
POTASSIUM SERPL-MCNC: 3.1 MMOL/L — LOW (ref 3.5–5.3)
POTASSIUM SERPL-SCNC: 3.1 MMOL/L — LOW (ref 3.5–5.3)
PROT SERPL-MCNC: 5.3 G/DL — LOW (ref 6–8.3)
PROTHROM AB SERPL-ACNC: 12.7 SEC — SIGNIFICANT CHANGE UP (ref 9.8–12.7)
RBC # BLD: 3.09 M/UL — LOW (ref 3.8–5.2)
RBC # FLD: 14.6 % — HIGH (ref 10.3–14.5)
SODIUM SERPL-SCNC: 148 MMOL/L — HIGH (ref 135–145)
SPECIMEN SOURCE: SIGNIFICANT CHANGE UP
WBC # BLD: 11.3 K/UL — HIGH (ref 3.8–10.5)
WBC # FLD AUTO: 11.3 K/UL — HIGH (ref 3.8–10.5)

## 2018-02-07 PROCEDURE — 99233 SBSQ HOSP IP/OBS HIGH 50: CPT | Mod: GC

## 2018-02-07 PROCEDURE — 99291 CRITICAL CARE FIRST HOUR: CPT

## 2018-02-07 PROCEDURE — 93971 EXTREMITY STUDY: CPT | Mod: 26,LT

## 2018-02-07 RX ORDER — METOPROLOL TARTRATE 50 MG
5 TABLET ORAL ONCE
Qty: 0 | Refills: 0 | Status: COMPLETED | OUTPATIENT
Start: 2018-02-07 | End: 2018-02-07

## 2018-02-07 RX ORDER — POTASSIUM CHLORIDE 20 MEQ
40 PACKET (EA) ORAL EVERY 4 HOURS
Qty: 0 | Refills: 0 | Status: DISCONTINUED | OUTPATIENT
Start: 2018-02-07 | End: 2018-02-07

## 2018-02-07 RX ORDER — POTASSIUM CHLORIDE 20 MEQ
40 PACKET (EA) ORAL EVERY 4 HOURS
Qty: 0 | Refills: 0 | Status: COMPLETED | OUTPATIENT
Start: 2018-02-07 | End: 2018-02-07

## 2018-02-07 RX ORDER — WARFARIN SODIUM 2.5 MG/1
2.5 TABLET ORAL ONCE
Qty: 0 | Refills: 0 | Status: COMPLETED | OUTPATIENT
Start: 2018-02-07 | End: 2018-02-07

## 2018-02-07 RX ORDER — MAGNESIUM SULFATE 500 MG/ML
2 VIAL (ML) INJECTION ONCE
Qty: 0 | Refills: 0 | Status: COMPLETED | OUTPATIENT
Start: 2018-02-07 | End: 2018-02-07

## 2018-02-07 RX ORDER — MIRTAZAPINE 45 MG/1
7.5 TABLET, ORALLY DISINTEGRATING ORAL AT BEDTIME
Qty: 0 | Refills: 0 | Status: DISCONTINUED | OUTPATIENT
Start: 2018-02-07 | End: 2018-02-12

## 2018-02-07 RX ORDER — HYDRALAZINE HCL 50 MG
5 TABLET ORAL ONCE
Qty: 0 | Refills: 0 | Status: COMPLETED | OUTPATIENT
Start: 2018-02-07 | End: 2018-02-07

## 2018-02-07 RX ORDER — METOPROLOL TARTRATE 50 MG
25 TABLET ORAL EVERY 8 HOURS
Qty: 0 | Refills: 0 | Status: DISCONTINUED | OUTPATIENT
Start: 2018-02-07 | End: 2018-02-08

## 2018-02-07 RX ORDER — ERTAPENEM SODIUM 1 G/1
1000 INJECTION, POWDER, LYOPHILIZED, FOR SOLUTION INTRAMUSCULAR; INTRAVENOUS ONCE
Qty: 0 | Refills: 0 | Status: COMPLETED | OUTPATIENT
Start: 2018-02-07 | End: 2018-02-07

## 2018-02-07 RX ORDER — HYDRALAZINE HCL 50 MG
50 TABLET ORAL EVERY 8 HOURS
Qty: 0 | Refills: 0 | Status: DISCONTINUED | OUTPATIENT
Start: 2018-02-07 | End: 2018-02-12

## 2018-02-07 RX ORDER — POTASSIUM PHOSPHATE, MONOBASIC POTASSIUM PHOSPHATE, DIBASIC 236; 224 MG/ML; MG/ML
15 INJECTION, SOLUTION INTRAVENOUS ONCE
Qty: 0 | Refills: 0 | Status: COMPLETED | OUTPATIENT
Start: 2018-02-07 | End: 2018-02-07

## 2018-02-07 RX ORDER — ERTAPENEM SODIUM 1 G/1
INJECTION, POWDER, LYOPHILIZED, FOR SOLUTION INTRAMUSCULAR; INTRAVENOUS
Qty: 0 | Refills: 0 | Status: DISCONTINUED | OUTPATIENT
Start: 2018-02-07 | End: 2018-02-07

## 2018-02-07 RX ORDER — POTASSIUM CHLORIDE 20 MEQ
10 PACKET (EA) ORAL
Qty: 0 | Refills: 0 | Status: COMPLETED | OUTPATIENT
Start: 2018-02-07 | End: 2018-02-07

## 2018-02-07 RX ADMIN — POTASSIUM PHOSPHATE, MONOBASIC POTASSIUM PHOSPHATE, DIBASIC 62.5 MILLIMOLE(S): 236; 224 INJECTION, SOLUTION INTRAVENOUS at 11:53

## 2018-02-07 RX ADMIN — Medication 12.5 MILLIGRAM(S): at 14:29

## 2018-02-07 RX ADMIN — ATORVASTATIN CALCIUM 40 MILLIGRAM(S): 80 TABLET, FILM COATED ORAL at 21:45

## 2018-02-07 RX ADMIN — MIRTAZAPINE 7.5 MILLIGRAM(S): 45 TABLET, ORALLY DISINTEGRATING ORAL at 22:25

## 2018-02-07 RX ADMIN — Medication 5 MILLIGRAM(S): at 19:31

## 2018-02-07 RX ADMIN — Medication 5 MILLIGRAM(S): at 22:25

## 2018-02-07 RX ADMIN — ENOXAPARIN SODIUM 50 MILLIGRAM(S): 100 INJECTION SUBCUTANEOUS at 11:52

## 2018-02-07 RX ADMIN — PANTOPRAZOLE SODIUM 40 MILLIGRAM(S): 20 TABLET, DELAYED RELEASE ORAL at 11:52

## 2018-02-07 RX ADMIN — Medication 50 MILLIGRAM(S): at 21:45

## 2018-02-07 RX ADMIN — Medication 5 MILLIGRAM(S): at 15:49

## 2018-02-07 RX ADMIN — Medication 50 MILLIGRAM(S): at 14:30

## 2018-02-07 RX ADMIN — ERTAPENEM SODIUM 120 MILLIGRAM(S): 1 INJECTION, POWDER, LYOPHILIZED, FOR SOLUTION INTRAMUSCULAR; INTRAVENOUS at 16:20

## 2018-02-07 RX ADMIN — Medication 25 MILLIGRAM(S): at 21:43

## 2018-02-07 RX ADMIN — Medication 100 MILLIEQUIVALENT(S): at 11:53

## 2018-02-07 RX ADMIN — Medication 50 GRAM(S): at 10:44

## 2018-02-07 RX ADMIN — Medication 40 MILLIEQUIVALENT(S): at 08:04

## 2018-02-07 RX ADMIN — Medication 25 MILLIGRAM(S): at 05:47

## 2018-02-07 RX ADMIN — Medication 81 MILLIGRAM(S): at 11:52

## 2018-02-07 RX ADMIN — WARFARIN SODIUM 2.5 MILLIGRAM(S): 2.5 TABLET ORAL at 22:25

## 2018-02-07 RX ADMIN — Medication 12.5 MILLIGRAM(S): at 05:47

## 2018-02-07 RX ADMIN — Medication 100 MILLIEQUIVALENT(S): at 10:44

## 2018-02-07 RX ADMIN — PIPERACILLIN AND TAZOBACTAM 25 GRAM(S): 4; .5 INJECTION, POWDER, LYOPHILIZED, FOR SOLUTION INTRAVENOUS at 05:47

## 2018-02-07 RX ADMIN — Medication 100 MILLIEQUIVALENT(S): at 08:54

## 2018-02-07 NOTE — PROGRESS NOTE ADULT - SUBJECTIVE AND OBJECTIVE BOX
[facility], Division of Infectious Diseases  Emerald Will, CARLOS Rios    Covering Dr. Brain Coyne.    Name: CARITO CONSTANTINO  Age: 82y  Gender: Female  MRN: [id number]    Interval History--  Notes reviewed. Patient feels cold. No fevers, chills, or rigors. Wants her daughter. Denies pain. No diarrhea. No other complaints presently.    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  Osteoarthritis  Afib  PVD (peripheral vascular disease)  Neuropathy  Gout  H/O cerebral aneurysm repair  CVA (cerebral vascular accident)  Rheumatoid arthritis  Asthma  Hyperlipidemia  Hypertension  S/P cataract surgery  Elective surgery  Elective surgery  S/P ORIF (open reduction internal fixation) fracture  H/O cerebral aneurysm repair  Renal stone  PVD (peripheral vascular disease)  S/P FRED (total abdominal hysterectomy)      For details regarding the patient's social history, family history, and other miscellaneous elements, please refer the initial infectious diseases consultation and/or the admitting history and physical examination for this admission.    Allergies    No Known Allergies    Intolerances        Medications--  Antibiotics:  piperacillin/tazobactam IVPB. 3.375 Gram(s) IV Intermittent every 12 hours    Immunologic:    Other:  aspirin enteric coated  atorvastatin  chlorhexidine 0.12% Liquid  enoxaparin Injectable  hydrALAZINE  magnesium sulfate  IVPB  metoprolol     tartrate  mirtazapine  pantoprazole  Injectable  potassium chloride   Powder  potassium chloride  10 mEq/100 mL IVPB  potassium phosphate IVPB  warfarin      Review of Systems--  A 10-point review of systems was obtained.   Review of systems otherwise negative except as previously noted.    Physical Examination--  Vital Signs: T(F): 98.2 (02-07-18 @ 08:30), Max: 98.2 (02-07-18 @ 00:31)  HR: 79 (02-07-18 @ 10:00)  BP: 173/70 (02-07-18 @ 10:00)  RR: 19 (02-07-18 @ 10:00)  SpO2: 97% (02-07-18 @ 10:00)  Wt(kg): --  General: Nontoxic-appearing Female in no acute distress.  HEENT: AT/NC. Anicteric. Conjunctiva pink and moist. Oropharynx clear.   Neck: Not rigid. No sense of mass. RIJ site C/D/I, transducer removed.  Nodes: None palpable.  Lungs: Poor effort bilaterally without rales, wheezing or rhonchi  Heart: Regular rate and rhythm. No Murmur. No rub. No gallop. No palpable thrill.  Abdomen: Bowel sounds present and normoactive. Soft. Nondistended. Nontender. No sense of mass. No organomegaly.  Extremities: No cyanosis or clubbing. No edema.   Skin: Warm. Dry. Good turgor. No rash. No vasculitic stigmata.  Psychiatric: Depressed mood and affect. Relatively slowed speech.          Laboratory Studies--  CBC                        8.8    11.3  )-----------( 162      ( 07 Feb 2018 06:25 )             27.5       Chemistries  02-07    148<H>  |  116<H>  |  28<H>  ----------------------------<  86  3.1<L>   |  24  |  1.40<H>    Ca    7.9<L>      07 Feb 2018 06:25  Phos  2.4     02-07  Mg     1.9     02-07    TPro  5.3<L>  /  Alb  2.3<L>  /  TBili  0.5  /  DBili  x   /  AST  50<H>  /  ALT  85<H>  /  AlkPhos  86  02-07    Urinalysis (02.05.18 @ 01:02)    pH Urine: 5.0    Glucose Qualitative, Urine: 50 mg/dL    Blood, Urine: Small    Color: Yellow    Urine Appearance: Turbid    Bilirubin: Negative    Ketone - Urine: Negative    Specific Gravity: 1.010    Protein, Urine: 500 mg/dL    Urobilinogen: Negative    Nitrite: Negative    Leukocyte Esterase Concentration: Moderate    Bacteria: TNTC    Epithelial Cells: Moderate    Red Blood Cell - Urine: 11-25 /HPF    White Blood Cell - Urine: 11-25        Culture Data  Culture - Urine (collected 05 Feb 2018 08:17)  Source: .Urine Catheterized  Final Report (07 Feb 2018 09:08):    10,000 - 49,000 CFU/mL Escherichia coli ESBL  Organism: Escherichia coli ESBL (07 Feb 2018 09:08)  Organism: Escherichia coli ESBL (07 Feb 2018 09:08)    Culture - Blood (collected 05 Feb 2018 00:36)  Source: .Blood Blood-Peripheral  Preliminary Report (06 Feb 2018 01:03):    No growth to date.    Culture - Blood (collected 05 Feb 2018 00:36)  Source: .Blood Blood-Peripheral  Preliminary Report (06 Feb 2018 01:03):    No growth to date.

## 2018-02-07 NOTE — PROGRESS NOTE ADULT - ASSESSMENT
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 Pt daughter, bp meds (BB, and Ca++ channel blocker and diuretic) recently increased by cardiology.    Neuro: mentating well, back to baseline, no issues, mild delirium likely 2/2 ICU stay/sedatives, s/p haldol prn  Cardio: HD stable, TVP removed, tolerating BB well, remains in SR, c/w hydralazine for HTN, consider increasing dose, target BP sbp 150s, c/w asa, statin, will hold ARB in setting of BRIDGET  pulm: passed CPAP, extubated to NC, sating well   GI: speech/shallow passed, dysphagia I advanced as tolerated, consider d/c ppi, shock liver close to resolution, no further diarrhea  Renal: resolving BRIDGET likely 2/2 hypoperfusion from shock/bradycardia, renal US neg for obstructive nephropathy, f/u urine lytes  DVT ppx, pt supposedly on warfarin PTA but INR is 1.0 which is not consistent with warfarin use (?adherence). 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 Pt daughter, bp meds (BB, and Ca++ channel blocker and diuretic) recently increased by cardiology.    Neuro: mentating well, back to baseline, no issues, mild delirium likely 2/2 ICU stay/sedatives, s/p haldol prn, decreased appetite in setting of ?depression, start mirtazapine 7.5   Cardio: HD stable, TVP removed, tolerating BB well, remains in SR, c/w hydralazine for HTN, consider increasing dose, target BP sbp 150s, c/w asa, statin, will hold ARB in setting of BRIDGET  pulm: passed CPAP, extubated to NC, sating well   GI: speech/shallow passed, dysphagia I advanced as tolerated, consider d/c ppi, shock liver close to resolution, no further diarrhea  Renal: resolving BRIDGET likely 2/2 hypoperfusion from shock/bradycardia, renal US neg for obstructive nephropathy, f/u urine lytes  DVT ppx, pt supposedly on warfarin PTA but INR is 1.0 which is not consistent with warfarin use (?adherence), will give 2.5 warfarin tonight, f/u AM INR, continue full dose Lovenox for bridge  ID: to finish course of zosyn for sepsis of unclear etiology, day 4/5, e.coli in urine otherwise cultures unrevealing  Endo: TFTs unremarkable, f/u random cortisol level  Dispo: full code  PT eval -> likely for TIFFANIE

## 2018-02-07 NOTE — DISCHARGE NOTE ADULT - CARE PROVIDER_API CALL
Taihr Lockhart (DO), Family Medicine  [address]  [address]  [place] [address]  Phone: [phone]  Fax: [phone]    Aristides Laughlin), Internal Medicine  [address] [place] [address]  Phone: [phone]  Fax: [phone]

## 2018-02-07 NOTE — PROGRESS NOTE ADULT - SUBJECTIVE AND OBJECTIVE BOX
Interval events: Extubated and TVP removed yesterday, minor bleeding at site, resolved now, reports feeling generalized weakness     ICU Vital Signs Last 24 Hrs  T(C): 36.6 (2018 05:12), Max: 36.8 (2018 00:31)  T(F): 97.9 (2018 05:12), Max: 98.2 (2018 00:31)  HR: 72 (2018 06:00) (69 - 95)  BP: 173/79 (2018 06:00) (144/64 - 200/88)  BP(mean): 114 (2018 06:00) (92 - 144)  ABP: --  ABP(mean): --  RR: 14 (2018 06:00) (11 - 23)  SpO2: 96% (2018 06:00) (92% - 100%)      I&O's Summary    2018 07:01  -  2018 07:00  --------------------------------------------------------  IN: 200 mL / OUT: 1035 mL / NET: -835 mL      Physical Exam:     General: on NC, aware, responds to commands and questions, nad  HEENT: NCAT, PERRLA  Neurology: A&Ox3, nonfocal  Respiratory: grossly cta  CV: RRR, soft S1/S2  Abdominal: Soft, NT, ND  Extremities: No C/C/E, + 2 peripheral pulses  Skin: warm, dry, normal color, perfusing well      MEDICATIONS  (STANDING):  aspirin enteric coated 81 milliGRAM(s) Oral daily  atorvastatin 40 milliGRAM(s) Oral at bedtime  chlorhexidine 0.12% Liquid 15 milliLiter(s) Swish and Spit two times a day  enoxaparin Injectable 50 milliGRAM(s) SubCutaneous daily  hydrALAZINE 25 milliGRAM(s) Oral every 8 hours  metoprolol     tartrate 12.5 milliGRAM(s) Oral every 8 hours  pantoprazole  Injectable 40 milliGRAM(s) IV Push daily  piperacillin/tazobactam IVPB. 3.375 Gram(s) IV Intermittent every 12 hours  potassium chloride   Powder 40 milliEquivalent(s) Oral every 4 hours        Labs                        8.8    11.3  )-----------( 162      ( 2018 06:25 )             27.5     2018 06:25    148    |  116    |  28     ----------------------------<  86     3.1     |  24     |  1.40     Ca    7.9        2018 06:25  Phos  2.4       2018 06:25  Mg     1.9       2018 06:25    TPro  5.3    /  Alb  2.3    /  TBili  0.5    /  DBili  x      /  AST  50     /  ALT  85     /  AlkPhos  86     2018 06:25    LIVER FUNCTIONS - ( 2018 06:25 )  Alb: 2.3 g/dL / Pro: 5.3 g/dL / ALK PHOS: 86 U/L / ALT: 85 U/L / AST: 50 U/L / GGT: x             CAPILLARY BLOOD GLUCOSE      CARDIAC MARKERS ( 2018 18:44 )  .035 ng/mL / x     / 43 U/L / x     / 1.4 ng/mL      Urinalysis Basic - ( 2018 01:02 )    Color: Yellow / Appearance: Turbid / S.010 / pH: x  Gluc: x / Ketone: Negative  / Bili: Negative / Urobili: Negative   Blood: x / Protein: 500 mg/dL / Nitrite: Negative   Leuk Esterase: Moderate / RBC: 11-25 /HPF / WBC 11-25   Sq Epi: x / Non Sq Epi: Moderate / Bacteria: TNTC                Rapid RVP Result: NotDetec ( @ 21:03)    ABG - ( 2018 06:45 )  pH: 7.39  /  pCO2: 31    /  pO2: 141   / HCO3: 20    / Base Excess: -6.0  /  SaO2: 99            Radiology:    < from: US Renal (18 @ 12:58) >  Echogenic kidneys compatible with medical renal disease. No   hydronephrosis.    9 mm nonobstructing right-sided intrarenal stone off the lower pole of   the kidney.    Gallstones and nonspecific gallbladder wall thickening.     < end of copied text >    < from: Xray Abdomen 1 View PORTABLE -Routine (18 @ 11:48) >  Nonspecific bowel gas pattern.    < end of copied text >      CENTRAL LINE: N    ISABEL: Y    A-LINE: n    GLOBAL ISSUE/BEST PRACTICE:  Analgesia: n  Sedation: n  HOB elevation: yes  Stress ulcer prophylaxis: y  VTE prophylaxis: y  Glycemic control: n  Nutrition: diet    CODE STATUS: Full code

## 2018-02-07 NOTE — PROVIDER CONTACT NOTE (CRITICAL VALUE NOTIFICATION) - ACTION/TREATMENT ORDERED:
No new orders.
Pt on Bicarb drip, titrating down on Levophed. Pt had 4L IV bolus in ER earlier.
changed Zosyn to Ertapenem

## 2018-02-07 NOTE — DISCHARGE NOTE ADULT - FINDINGS/TREATMENT
discontinued prior to discharge. PPM not indicated Weaned off vent without incident; O2 sats WNL on RA at time of discharge.

## 2018-02-07 NOTE — SWALLOW BEDSIDE ASSESSMENT ADULT - ASR SWALLOW REFERRAL
secondary to poor intake - pt's daughter reports she has already spoken to RN during this admission/Registered Dietitian

## 2018-02-07 NOTE — PROGRESS NOTE ADULT - ASSESSMENT
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  She is at high risk for CDAD as she was on IV antibiotics 6 weeks ago but her stool was formed .    She most ly likely had severe electrolyte imbalance and hypotension due to recently increased doses of the cardiac meds and addition of diuretics and her intake was poor compounded by diarrhea . She has no clinical signs to suggest infectious etiology of shock .     Shock appears resolved.   Transducer removed, HR okay  Abdominal exam is benign, low suspicion for cholecystitis here. Suspect improvement in LFT has to do with resolution of shock and improvement in cardiac status  No active diarrhea to suggest C. difficile presently but remains at risk  WBC minimally elevated  Patient with bacteriuria, but denies any symptoms. Patient improving without much in the way of effective antibiotic therapy (Zosyn not standard of care for ESBL organism). U/A also with epithelial cells, further decreasing the value of the accompanying culture.

## 2018-02-07 NOTE — DISCHARGE NOTE ADULT - SECONDARY DIAGNOSIS.
Chronic atrial fibrillation BRIDGET (acute kidney injury) Bradycardia Essential hypertension Hyperlipidemia, unspecified hyperlipidemia type Neuropathy

## 2018-02-07 NOTE — DISCHARGE NOTE ADULT - PLAN OF CARE
Prevent recurrence take medications as prescribed.  F/u with Cardiologist within 3 to 5 days. Continue all meds.  F/U with Cardiologist Resolved.  Likely has CKD2  F/Y with PCP Resolved. Secondary to meds.  F/u with Cardiologist Continue all meds.  F/U with your doctor Continue home meds.  F/u with PCP.

## 2018-02-07 NOTE — DISCHARGE NOTE ADULT - CARE PLAN
Principal Discharge DX:	Shock  Goal:	Prevent recurrence  Assessment and plan of treatment:	take medications as prescribed.  F/u with Cardiologist within 3 to 5 days.  Secondary Diagnosis:	Chronic atrial fibrillation  Assessment and plan of treatment:	Continue all meds.  F/U with Cardiologist  Secondary Diagnosis:	BRIDGET (acute kidney injury)  Assessment and plan of treatment:	Resolved.  Likely has CKD2  F/Y with PCP  Secondary Diagnosis:	Bradycardia  Assessment and plan of treatment:	Resolved. Secondary to meds.  F/u with Cardiologist  Secondary Diagnosis:	Essential hypertension  Assessment and plan of treatment:	Continue all meds.  F/U with your doctor  Secondary Diagnosis:	Hyperlipidemia, unspecified hyperlipidemia type  Assessment and plan of treatment:	Continue home meds.  F/u with PCP.  Secondary Diagnosis:	Neuropathy  Assessment and plan of treatment:	Continue home meds.  F/u with PCP.

## 2018-02-07 NOTE — SWALLOW BEDSIDE ASSESSMENT ADULT - ORAL PHASE
Within functional limits Decreased anterior-posterior movement of the bolus Lingual stasis/Decreased anterior-posterior movement of the bolus

## 2018-02-07 NOTE — PROGRESS NOTE ADULT - SUBJECTIVE AND OBJECTIVE BOX
[facility] - Dahiana Bañuelos, Evens, Toney, Terrie, Filiberto Laughlin  Office Number:  [phone]    Patient resting comfortably in bed in NAD.  Laying flat with no respiratory distress.  No complaints of chest pain, dyspnea, palpitations, PND, or orthopnea.    Telemetry:  Sinus rhythm    MEDICATIONS  (STANDING):  aspirin enteric coated 81 milliGRAM(s) Oral daily  atorvastatin 40 milliGRAM(s) Oral at bedtime  chlorhexidine 0.12% Liquid 15 milliLiter(s) Swish and Spit two times a day  enoxaparin Injectable 50 milliGRAM(s) SubCutaneous daily  hydrALAZINE 25 milliGRAM(s) Oral every 8 hours  metoprolol     tartrate 12.5 milliGRAM(s) Oral every 8 hours  pantoprazole  Injectable 40 milliGRAM(s) IV Push daily  piperacillin/tazobactam IVPB. 3.375 Gram(s) IV Intermittent every 12 hours        Allergies    No Known Allergies        Vital Signs Last 24 Hrs  T(C): 36.6 (07 Feb 2018 05:12), Max: 36.8 (07 Feb 2018 00:31)  T(F): 97.9 (07 Feb 2018 05:12), Max: 98.2 (07 Feb 2018 00:31)  HR: 72 (07 Feb 2018 06:00) (63 - 95)  BP: 173/79 (07 Feb 2018 06:00) (144/64 - 200/88)  BP(mean): 114 (07 Feb 2018 06:00) (92 - 144)  RR: 14 (07 Feb 2018 06:00) (10 - 23)  SpO2: 96% (07 Feb 2018 06:00) (92% - 100%)    I&O's Summary    05 Feb 2018 07:01  -  06 Feb 2018 07:00  --------------------------------------------------------  IN: 1235.9 mL / OUT: 1100 mL / NET: 135.9 mL    06 Feb 2018 07:01  -  07 Feb 2018 06:55  --------------------------------------------------------  IN: 200 mL / OUT: 1035 mL / NET: -835 mL        ON EXAM:    Constitutional: NAD, alert and oriented  HEENT: MM dry, anicteric  Pulmonary: Decreased breath sounds b/l. No rales, crackles or wheeze appreciated.   Cardiovascular: Regular, S1 and S2, distant  Gastrointestinal: Bowel Sounds present, soft, nontender.   Lymph: No peripheral edema. No lymphadenopathy.  Skin: No visible rashes or ulcers.  Psych:  unable to assess      LABS: All Labs Reviewed:                        9.1    11.5  )-----------( 166      ( 06 Feb 2018 06:04 )             28.7                         9.5    12.8  )-----------( 280      ( 05 Feb 2018 05:58 )             29.9                         9.9    12.5  )-----------( 260      ( 04 Feb 2018 18:25 )             32.0     06 Feb 2018 06:04    146    |  117    |  24     ----------------------------<  140    3.8     |  20     |  1.50   05 Feb 2018 18:44    146    |  117    |  21     ----------------------------<  130    3.9     |  19     |  1.50   05 Feb 2018 05:58    146    |  115    |  25     ----------------------------<  203    2.9     |  19     |  1.60     Ca    7.6        06 Feb 2018 06:04  Ca    7.4        05 Feb 2018 18:44  Ca    7.1        05 Feb 2018 05:58  Phos  3.7       06 Feb 2018 06:04  Phos  2.2       05 Feb 2018 05:58  Mg     2.1       06 Feb 2018 06:04  Mg     1.6       05 Feb 2018 05:58    TPro  5.4    /  Alb  2.3    /  TBili  0.5    /  DBili  x      /  AST  85     /  ALT  112    /  AlkPhos  106    06 Feb 2018 06:04  TPro  5.6    /  Alb  2.5    /  TBili  0.6    /  DBili  x      /  AST  233    /  ALT  174    /  AlkPhos  125    05 Feb 2018 05:58  TPro  4.7    /  Alb  2.1    /  TBili  0.4    /  DBili  x      /  AST  80     /  ALT  64     /  AlkPhos  74     04 Feb 2018 19:31      CARDIAC MARKERS ( 05 Feb 2018 18:44 )  .035 ng/mL / x     / 43 U/L / x     / 1.4 ng/mL  CARDIAC MARKERS ( 05 Feb 2018 07:03 )  .099 ng/mL / x     / x     / x     / x          Blood Culture: Organism --  Gram Stain Blood -- Gram Stain --  Specimen Source .Urine Catheterized  Culture-Blood --    Organism --  Gram Stain Blood -- Gram Stain --  Specimen Source .Blood Blood-Peripheral  Culture-Blood --        02-06 @ 06:04  TSH: 1.27  02-05 @ 00:22  TSH: 1.45

## 2018-02-07 NOTE — DISCHARGE NOTE ADULT - HOSPITAL COURSE
82F PMHx Afib on Coumadin, HLD, CVA (1990's), MVP, OA, HTN, RA, PVD (s/p RLE bypassx3, most recently 2012), Neuropathy, hx OM BIBEMS from home with cc of diarrhea found to be bradycardic and hypotensive.  TVP placed in ED for LOC and brief loss of pulse. Admitted to ICU for profound bradycardia and shock progressing to cardiac arrest, resulting in acute respiratory failure, likely caused from severe electrolyte imbalance in setting BB and Ca++ use, diarrhea and poor oral intake. Pt was intubated and started on pressor support with broad spectrum abx and stress steroids.  Hemodynamics improved with above therapy, TVP removed with resolved bradycardia, weaned off vent successfully and stabilized on NC.  Shock liver and BRIDGET resolved. Restarted home dose statin, asa and low dose BB as HR tolerated.  Pt optimized from ICU perspective and stable for transfer to [facility] with continuous pulse ox. 82F PMHx Afib on Coumadin, HLD, CVA (1990's), MVP, OA, HTN, RA, PVD (s/p RLE bypassx3, most recently 2012), Neuropathy, hx OM BIBEMS from home with cc of diarrhea found to be bradycardic and hypotensive.  TVP placed in ED for LOC and brief loss of pulse. Admitted to ICU for profound bradycardia and shock progressing to cardiac arrest, resulting in acute respiratory failure, likely caused from severe electrolyte imbalance in setting BB and Ca++ use, diarrhea and poor oral intake. Pt was intubated and started on pressor support with broad spectrum abx and stress steroids.  Hemodynamics improved with above therapy, TVP removed with resolved bradycardia, weaned off vent successfully and stabilized on NC.  Shock liver and BRIDGET resolved. Restarted home dose statin, asa and low dose BB as HR tolerated.  Pt optimized from ICU perspective and was  transferred to tele with continuous pulse ox. INR is subtherapeutic and needed to be bridged with Lovenox. 82F PMHx Afib on Coumadin, HLD, CVA (1990's), MVP, OA, HTN, RA, PVD (s/p RLE bypassx3, most recently 2012), Neuropathy, hx OM BIBEMS from home with cc of diarrhea found to be bradycardic and hypotensive.  Pt required intubation and TVP in ED for LOC and brief loss of pulse. Admitted to ICU for profound bradycardia and shock progressing to cardiac arrest, resulting in acute respiratory failure, likely caused from severe electrolyte imbalance in setting BB and Ca++ use, diarrhea and poor oral intake. Pt was started on pressor support with broad spectrum abx and stress steroids.  Hemodynamics improved with above therapy, TVP removed with resolved bradycardia, weaned off vent successfully and stabilized on NC.  Shock liver and BRIDGET resolved. Restarted home dose statin, asa and low dose BB as HR tolerated.  Pt optimized from ICU perspective and was transferred to [facility] with continuous pulse ox. Coumadin/lovenox bridge initiated.  INR subtherapeutic at time of discharge; pt will require titration of coumadin dose to optimal INR 2-3.  Metoprolol, cardizem, hydralazine restarted; BP and HR remained stable. Pt seen and evaluated by PT during admission; cleared for discharge to [facility]. 82F PMHx Afib on Coumadin, HLD, CVA (1990's), MVP, OA, HTN, RA, PVD (s/p RLE bypassx3, most recently 2012), Neuropathy, hx OM BIBEMS from home with cc of diarrhea found to be bradycardic and hypotensive.  Pt required intubation and TVP in ED for LOC and brief loss of pulse. Admitted to ICU for profound bradycardia and shock progressing to cardiac arrest, resulting in acute respiratory failure, likely caused from severe electrolyte imbalance in setting BB and Ca++ use, diarrhea and poor oral intake. Pt was started on pressor support with broad spectrum abx and stress steroids.  Hemodynamics improved with above therapy, TVP removed with resolved bradycardia, weaned off vent successfully and stabilized on NC.  Shock liver and BRIDGET resolved. Restarted home dose statin, asa and low dose BB as HR tolerated.  Pt optimized from ICU perspective and was transferred to [facility] with continuous pulse ox. Coumadin/lovenox bridge initiated.  INR subtherapeutic at time of discharge; pt will require titration of coumadin dose to optimal INR 2-3.  Metoprolol, cardizem, hydralazine restarted; BP and HR remained stable. Pt seen and evaluated by PT during admission; recommended discharge to [facility].      Patient was medically optimized and improved clinically throughout hospital course. Patient seen and examined on day of discharge.    Vital Signs Last 24 Hrs  T(C): 36.3 (12 Feb 2018 12:00), Max: 36.8 (11 Feb 2018 16:00)  T(F): 97.3 (12 Feb 2018 12:00), Max: 98.3 (11 Feb 2018 16:00)  HR: 87 (12 Feb 2018 12:00) (71 - 87)  BP: 119/59 (12 Feb 2018 12:00) (108/70 - 134/75)  BP(mean): --  RR: 18 (12 Feb 2018 12:00) (16 - 20)  SpO2: 99% (12 Feb 2018 12:00) (96% - 99%)    Physical Exam:  General: Well developed, well nourished, NAD  HEENT: NCAT, PERRLA, EOMI bl, moist mucous membranes   Neck: Supple, nontender, no mass  Neurology: A&Ox3, nonfocal, CN II-XII grossly intact  Respiratory: CTA B/L, No W/R/R  CV: RRR, +S1/S2, no murmurs, rubs or gallops  Abdominal: Soft, NT, ND +BSx4  Extremities: No C/C/E, + peripheral pulses  MSK: no joint erythema or warmth, no joint swelling   Skin: warm, dry, normal color, no rash or abnormal lesions    Patient is medically stable and cleared for discharge to [facility] with outpatient follow up.

## 2018-02-07 NOTE — PROGRESS NOTE ADULT - ATTENDING COMMENTS
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 Now extubated 2/6, resolved acidosis, bradycardia, and hypotension.    - Improving delirium, avoid pharmacologic measures if possible, no opioids/BZ's, PT eval, out of bed  - HD stable, continue low dose metoprolol, no heart block or bradycardia, increase hydralazine to 50 q8h  - Continue ASA, statin; hold ARB given BRIDGET  - A/C with Lovenox 1 mg/kg qd, restart warfarin  - Doing well on nasal cannula, resolved respiratory failure  - Speech and swallow eval --> dysphagia 2 with thin liquids  - Mild BRIDGET, improving, replete hypokalemia and hypophosphatemia  - ESBL E.coli UTI, change zosyn to ertapenem, f/up ID  - Resolved diarrhea  - Resolving mild transaminitis  - DVT ppx  - Discussed with patient at bedside, full code  - Stable for tele

## 2018-02-07 NOTE — DISCHARGE NOTE ADULT - MEDICATION SUMMARY - MEDICATIONS TO STOP TAKING
I will STOP taking the medications listed below when I get home from the hospital:    losartan 100 mg oral tablet  -- 1 tab(s) by mouth once a day (at bedtime)

## 2018-02-07 NOTE — SWALLOW BEDSIDE ASSESSMENT ADULT - ASR SWALLOW DENTITION
pt utilizes dentures at home to consume regular consistencies, family will not be bringing dentures to this faciltiy/edentulous, does not have dentures

## 2018-02-07 NOTE — DISCHARGE NOTE ADULT - PATIENT PORTAL LINK FT
You can access the [facility] Patient Portal, offered by [facility], by registering with the following website: [email or web address]

## 2018-02-07 NOTE — DISCHARGE NOTE ADULT - MEDICATION SUMMARY - MEDICATIONS TO CHANGE
I will SWITCH the dose or number of times a day I take the medications listed below when I get home from the hospital:    warfarin 3 mg oral tablet  -- 1 tab(s) by mouth once a day  -- Do not take this drug if you are pregnant.  It is very important that you take or use this exactly as directed.  Do not skip doses or discontinue unless directed by your doctor.  Obtain medical advice before taking any non-prescription drugs as some may affect the action of this medication.

## 2018-02-07 NOTE — PROGRESS NOTE ADULT - ASSESSMENT
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 Bradycardia was likely in setting of metabolic derrangements.   - Currently maintaining SR. Metoprolol 12.5 TID started.  Patient is tolerating this dose.  Monitor rhythm very closely while we re-introduce meg agents.    - Extubated yesterday, and tolerating room air.   - check echo to assess MR. Has high risk of flash pulm edema  - no signs of HF.   - currently off of pressors.   - Try to maintain MAP >65 to allow for most perfusion to lower ext  - On full dose Lovenox  - Monitor and replete electrolytes. Keep K>4.0 and Mg>2.0.   - Further cardiac workup will depend on clinical course.     Patient at high risk for decompensation. Critically ill. >35 minutes of critical care time was spent with this patient.

## 2018-02-07 NOTE — DISCHARGE NOTE ADULT - NSFUCAREDSC_ALL_CORE_SIUH
No, the patient is not being discharged from [facility]

## 2018-02-07 NOTE — DISCHARGE NOTE ADULT - MEDICATION SUMMARY - MEDICATIONS TO TAKE
I will START or STAY ON the medications listed below when I get home from the hospital:    aspirin 81 mg oral delayed release tablet  -- 1 tab(s) by mouth once a day  -- Indication: For Hyperlipidemia    acetaminophen 325 mg oral tablet  -- 2 tab(s) by mouth every 6 hours, As needed, Mild Pain (1 - 3)  -- Indication: For Pain    dilTIAZem 30 mg oral tablet  -- 1 tab(s) by mouth every 6 hours  -- Indication: For Paroxysmal atrial fibrillation    enoxaparin 40 mg/0.4 mL injectable solution  -- 45 unit(s) injectable 2 times a day  -- Indication: For Paroxysmal atrial fibrillation    gabapentin 100 mg oral capsule  --  by mouth 3 times a day  -- Indication: For Neuropathy    mirtazapine 7.5 mg oral tablet  -- 1 tab(s) by mouth once a day (at bedtime)  -- Indication: For Depression    atorvastatin 40 mg oral tablet  -- 1 tab(s) by mouth once a day (at bedtime)  -- Indication: For Hyperlipidemia    metoprolol tartrate 25 mg oral tablet  -- 1 tab(s) by mouth every 6 hours  -- Indication: For Hypertension    nystatin 100,000 units/g topical powder  -- 1 application on skin 2 times a day  -- Indication: For Rash    docusate sodium 100 mg oral capsule  -- 1 cap(s) by mouth 2 times a day, As needed, Constipation  -- Indication: For Constipation    polyethylene glycol 3350 oral powder for reconstitution  -- 17 gram(s) by mouth once a day, As needed, Constipation  -- Indication: For Constipation    senna oral tablet  -- 2 tab(s) by mouth once a day (at bedtime)  -- Indication: For Constipation    pantoprazole 40 mg oral delayed release tablet  -- 1 tab(s) by mouth once a day (before a meal)  -- Indication: For GERD    hydrALAZINE 50 mg oral tablet  -- 1 tab(s) by mouth every 8 hours  -- Indication: For Hypertension    multivitamin  --   once a day  -- Indication: For Vitamin I will START or STAY ON the medications listed below when I get home from the hospital:    aspirin 81 mg oral delayed release tablet  -- 1 tab(s) by mouth once a day  -- Indication: For Hyperlipidemia    acetaminophen 325 mg oral tablet  -- 2 tab(s) by mouth every 6 hours, As needed, Mild Pain (1 - 3)  -- Indication: For Pain    dilTIAZem 30 mg oral tablet  -- 1 tab(s) by mouth every 6 hours  -- Indication: For Paroxysmal atrial fibrillation    enoxaparin 40 mg/0.4 mL injectable solution  -- 45 unit(s) injectable 2 times a day. Until INR Therapeutic at 2 to 3. Couamdin dose daily based upon INR.   -- Indication: For Afib    warfarin 6 mg oral tablet  -- 1 tab(s) by mouth once. Daily coumadin dose based upon INR.   -- Indication: For Afib    gabapentin 100 mg oral capsule  --  by mouth 3 times a day  -- Indication: For Neuropathy    mirtazapine 7.5 mg oral tablet  -- 1 tab(s) by mouth once a day (at bedtime)  -- Indication: For Depression    atorvastatin 40 mg oral tablet  -- 1 tab(s) by mouth once a day (at bedtime)  -- Indication: For Hyperlipidemia    metoprolol tartrate 25 mg oral tablet  -- 1 tab(s) by mouth every 6 hours  -- Indication: For Hypertension    nystatin 100,000 units/g topical powder  -- 1 application on skin 2 times a day  -- Indication: For Rash    docusate sodium 100 mg oral capsule  -- 1 cap(s) by mouth 2 times a day, As needed, Constipation  -- Indication: For Constipation    polyethylene glycol 3350 oral powder for reconstitution  -- 17 gram(s) by mouth once a day, As needed, Constipation  -- Indication: For Constipation    senna oral tablet  -- 2 tab(s) by mouth once a day (at bedtime)  -- Indication: For Constipation    pantoprazole 40 mg oral delayed release tablet  -- 1 tab(s) by mouth once a day (before a meal)  -- Indication: For GERD    hydrALAZINE 50 mg oral tablet  -- 1 tab(s) by mouth every 8 hours  -- Indication: For Hypertension    multivitamin  --   once a day  -- Indication: For Vitamin

## 2018-02-07 NOTE — SWALLOW BEDSIDE ASSESSMENT ADULT - COMMENTS
Pt is 81 y/o female alert and verbal with some confusion. Admitted following chronic diarrhea at home for approximately 2 weeks with hypotension and bradycardia. CXR negative for infiltrates/PNA. Pt presents with oropharyngeal dysphagia marked by weak mastication of hard solids (patient's family does not wish to utilize dentures in this facility due to fear of losing them) and mild delay in pharyngeal swallow across all consistencies presented. No s/s aspiration with any PO texture presented. Recommend dysphagia 2 solids with thin liquids. Discussed w/MD, patient and family.

## 2018-02-08 LAB
ALBUMIN SERPL ELPH-MCNC: 2.8 G/DL — LOW (ref 3.3–5)
ALP SERPL-CCNC: 100 U/L — SIGNIFICANT CHANGE UP (ref 40–120)
ALT FLD-CCNC: 76 U/L — SIGNIFICANT CHANGE UP (ref 12–78)
ANION GAP SERPL CALC-SCNC: 9 MMOL/L — SIGNIFICANT CHANGE UP (ref 5–17)
APTT BLD: 34.6 SEC — SIGNIFICANT CHANGE UP (ref 27.5–37.4)
AST SERPL-CCNC: 44 U/L — HIGH (ref 15–37)
BASOPHILS # BLD AUTO: 0.1 K/UL — SIGNIFICANT CHANGE UP (ref 0–0.2)
BASOPHILS NFR BLD AUTO: 0.9 % — SIGNIFICANT CHANGE UP (ref 0–2)
BILIRUB SERPL-MCNC: 0.8 MG/DL — SIGNIFICANT CHANGE UP (ref 0.2–1.2)
BUN SERPL-MCNC: 20 MG/DL — SIGNIFICANT CHANGE UP (ref 7–23)
CALCIUM SERPL-MCNC: 8.5 MG/DL — SIGNIFICANT CHANGE UP (ref 8.5–10.1)
CHLORIDE SERPL-SCNC: 110 MMOL/L — HIGH (ref 96–108)
CO2 SERPL-SCNC: 27 MMOL/L — SIGNIFICANT CHANGE UP (ref 22–31)
CREAT SERPL-MCNC: 1.2 MG/DL — SIGNIFICANT CHANGE UP (ref 0.5–1.3)
EOSINOPHIL # BLD AUTO: 0.1 K/UL — SIGNIFICANT CHANGE UP (ref 0–0.5)
EOSINOPHIL NFR BLD AUTO: 1.1 % — SIGNIFICANT CHANGE UP (ref 0–6)
GLUCOSE SERPL-MCNC: 96 MG/DL — SIGNIFICANT CHANGE UP (ref 70–99)
HCT VFR BLD CALC: 32.5 % — LOW (ref 34.5–45)
HGB BLD-MCNC: 10 G/DL — LOW (ref 11.5–15.5)
INR BLD: 1.19 RATIO — HIGH (ref 0.88–1.16)
LYMPHOCYTES # BLD AUTO: 1.6 K/UL — SIGNIFICANT CHANGE UP (ref 1–3.3)
LYMPHOCYTES # BLD AUTO: 18.7 % — SIGNIFICANT CHANGE UP (ref 13–44)
MAGNESIUM SERPL-MCNC: 2.2 MG/DL — SIGNIFICANT CHANGE UP (ref 1.6–2.6)
MCHC RBC-ENTMCNC: 27.6 PG — SIGNIFICANT CHANGE UP (ref 27–34)
MCHC RBC-ENTMCNC: 30.9 GM/DL — LOW (ref 32–36)
MCV RBC AUTO: 89.2 FL — SIGNIFICANT CHANGE UP (ref 80–100)
MONOCYTES # BLD AUTO: 0.7 K/UL — SIGNIFICANT CHANGE UP (ref 0–0.9)
MONOCYTES NFR BLD AUTO: 8 % — SIGNIFICANT CHANGE UP (ref 1–9)
NEUTROPHILS # BLD AUTO: 6.1 K/UL — SIGNIFICANT CHANGE UP (ref 1.8–7.4)
NEUTROPHILS NFR BLD AUTO: 71.4 % — SIGNIFICANT CHANGE UP (ref 43–77)
PHOSPHATE SERPL-MCNC: 2.8 MG/DL — SIGNIFICANT CHANGE UP (ref 2.5–4.5)
PLATELET # BLD AUTO: 168 K/UL — SIGNIFICANT CHANGE UP (ref 150–400)
POTASSIUM SERPL-MCNC: 3.5 MMOL/L — SIGNIFICANT CHANGE UP (ref 3.5–5.3)
POTASSIUM SERPL-SCNC: 3.5 MMOL/L — SIGNIFICANT CHANGE UP (ref 3.5–5.3)
PROT SERPL-MCNC: 6.1 G/DL — SIGNIFICANT CHANGE UP (ref 6–8.3)
PROTHROM AB SERPL-ACNC: 13 SEC — HIGH (ref 9.8–12.7)
RBC # BLD: 3.64 M/UL — LOW (ref 3.8–5.2)
RBC # FLD: 15 % — HIGH (ref 10.3–14.5)
SODIUM SERPL-SCNC: 146 MMOL/L — HIGH (ref 135–145)
WBC # BLD: 8.6 K/UL — SIGNIFICANT CHANGE UP (ref 3.8–10.5)
WBC # FLD AUTO: 8.6 K/UL — SIGNIFICANT CHANGE UP (ref 3.8–10.5)

## 2018-02-08 PROCEDURE — 99233 SBSQ HOSP IP/OBS HIGH 50: CPT

## 2018-02-08 PROCEDURE — 99233 SBSQ HOSP IP/OBS HIGH 50: CPT | Mod: GC

## 2018-02-08 RX ORDER — HYDRALAZINE HCL 50 MG
10 TABLET ORAL ONCE
Qty: 0 | Refills: 0 | Status: COMPLETED | OUTPATIENT
Start: 2018-02-08 | End: 2018-02-08

## 2018-02-08 RX ORDER — PANTOPRAZOLE SODIUM 20 MG/1
40 TABLET, DELAYED RELEASE ORAL DAILY
Qty: 0 | Refills: 0 | Status: DISCONTINUED | OUTPATIENT
Start: 2018-02-08 | End: 2018-02-12

## 2018-02-08 RX ORDER — WARFARIN SODIUM 2.5 MG/1
2.5 TABLET ORAL ONCE
Qty: 0 | Refills: 0 | Status: COMPLETED | OUTPATIENT
Start: 2018-02-08 | End: 2018-02-08

## 2018-02-08 RX ORDER — POTASSIUM CHLORIDE 20 MEQ
40 PACKET (EA) ORAL EVERY 4 HOURS
Qty: 0 | Refills: 0 | Status: COMPLETED | OUTPATIENT
Start: 2018-02-08 | End: 2018-02-08

## 2018-02-08 RX ORDER — METOPROLOL TARTRATE 50 MG
25 TABLET ORAL EVERY 6 HOURS
Qty: 0 | Refills: 0 | Status: DISCONTINUED | OUTPATIENT
Start: 2018-02-08 | End: 2018-02-12

## 2018-02-08 RX ADMIN — Medication 40 MILLIEQUIVALENT(S): at 08:48

## 2018-02-08 RX ADMIN — ATORVASTATIN CALCIUM 40 MILLIGRAM(S): 80 TABLET, FILM COATED ORAL at 22:54

## 2018-02-08 RX ADMIN — MIRTAZAPINE 7.5 MILLIGRAM(S): 45 TABLET, ORALLY DISINTEGRATING ORAL at 22:54

## 2018-02-08 RX ADMIN — Medication 50 MILLIGRAM(S): at 22:54

## 2018-02-08 RX ADMIN — Medication 50 MILLIGRAM(S): at 06:01

## 2018-02-08 RX ADMIN — Medication 25 MILLIGRAM(S): at 06:01

## 2018-02-08 RX ADMIN — ENOXAPARIN SODIUM 50 MILLIGRAM(S): 100 INJECTION SUBCUTANEOUS at 12:04

## 2018-02-08 RX ADMIN — Medication 50 MILLIGRAM(S): at 14:16

## 2018-02-08 RX ADMIN — Medication 25 MILLIGRAM(S): at 17:12

## 2018-02-08 RX ADMIN — Medication 25 MILLIGRAM(S): at 22:53

## 2018-02-08 RX ADMIN — PANTOPRAZOLE SODIUM 40 MILLIGRAM(S): 20 TABLET, DELAYED RELEASE ORAL at 12:04

## 2018-02-08 RX ADMIN — Medication 10 MILLIGRAM(S): at 01:06

## 2018-02-08 RX ADMIN — Medication 25 MILLIGRAM(S): at 14:16

## 2018-02-08 RX ADMIN — Medication 81 MILLIGRAM(S): at 12:04

## 2018-02-08 RX ADMIN — Medication 40 MILLIEQUIVALENT(S): at 12:04

## 2018-02-08 RX ADMIN — Medication 10 MILLIGRAM(S): at 06:01

## 2018-02-08 RX ADMIN — WARFARIN SODIUM 2.5 MILLIGRAM(S): 2.5 TABLET ORAL at 22:53

## 2018-02-08 NOTE — PROGRESS NOTE ADULT - ATTENDING COMMENTS
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 Now extubated 2/6, resolved acidosis, bradycardia, and hypotension.    - Resolved, avoid pharmacologic measures if possible, no opioids/BZ's, PT eval, out of bed; doing well with mirtazapine  - HD stable, increase metoprolol to 25 q6h, continue hydralazine 50 q6h; continue aspirin, statin, hold arb given BRIDGET  - A/C with Lovenox, dose warfarin 2.5 mg tonight  - Doing well on nasal cannula, resolved respiratory failure  - Tolerating diet  - Improving BRIDGET, replete hypokalemia  - ESBL E.coli in urine, likely colonization, improved without treatment, hold ertapenem for now  - Resolved diarrhea  - Resolving mild transaminitis  - DVT ppx  - Discussed with patient and family at bedside, full code  - Stable for tele

## 2018-02-08 NOTE — PROGRESS NOTE ADULT - ASSESSMENT
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  She is at high risk for CDAD as she was on IV antibiotics 6 weeks ago but her stool was formed .    She most likely had severe electrolyte imbalance and hypotension due to recently increased doses of the cardiac meds and addition of diuretics and her intake was poor compounded by diarrhea . She has no clinical signs to suggest infectious etiology of shock .     Shock appears resolved.   Transducer removed, HR okay  Abdominal exam is benign, low suspicion for cholecystitis here. Suspect improvement in LFT has to do with resolution of shock and improvement in cardiac status  No active diarrhea to suggest C. difficile presently but remains at risk  WBC minimally elevated  Patient with bacteriuria, but denies any symptoms. Patient improving without much in the way of effective antibiotic therapy (Zosyn not standard of care for ESBL organism). U/A also with epithelial cells, further decreasing the value of the accompanying culture.

## 2018-02-08 NOTE — PROGRESS NOTE ADULT - SUBJECTIVE AND OBJECTIVE BOX
Interval events: ESBL e.coli urine, on isolation, no other events    ICU Vital Signs Last 24 Hrs  T(C): 36.6 (2018 08:13), Max: 37.1 (2018 04:55)  T(F): 97.8 (2018 08:13), Max: 98.7 (2018 04:55)  HR: 85 (2018 08:00) (71 - 104)  BP: 152/74 (2018 08:00) (138/69 - 190/89)  BP(mean): 106 (2018 08:00) (97 - 131)  ABP: --  ABP(mean): --  RR: 17 (2018 08:00) (15 - 28)  SpO2: 95% (2018 08:00) (92% - 98%)      I&O's Summary    2018 07:01  -  2018 07:00  --------------------------------------------------------  IN: 1240 mL / OUT: 1050 mL / NET: 190 mL      Physical Exam:     General: on NC, aware, responds to commands and questions, nad  HEENT: NCAT, PERRLA  Neurology: A&Ox3, nonfocal  Respiratory: grossly cta  CV: RRR, soft S1/S2  Abdominal: Soft, NT, ND  Extremities: No C/C/E, + 2 peripheral pulses  Skin: warm, dry, normal color, perfusing well      MEDICATIONS  (STANDING):  aspirin enteric coated 81 milliGRAM(s) Oral daily  atorvastatin 40 milliGRAM(s) Oral at bedtime  enoxaparin Injectable 50 milliGRAM(s) SubCutaneous daily  hydrALAZINE 50 milliGRAM(s) Oral every 8 hours  metoprolol     tartrate 25 milliGRAM(s) Oral every 8 hours  mirtazapine 7.5 milliGRAM(s) Oral at bedtime  pantoprazole  Injectable 40 milliGRAM(s) IV Push daily        Labs                        10.0   8.6   )-----------( 168      ( 2018 06:24 )             32.5     2018 06:24    146    |  110    |  20     ----------------------------<  96     3.5     |  27     |  1.20     Ca    8.5        2018 06:24  Phos  2.8       2018 06:24  Mg     2.2       2018 06:24    TPro  6.1    /  Alb  2.8    /  TBili  0.8    /  DBili  x      /  AST  44     /  ALT  76     /  AlkPhos  100    2018 06:24    LIVER FUNCTIONS - ( 2018 06:24 )  Alb: 2.8 g/dL / Pro: 6.1 g/dL / ALK PHOS: 100 U/L / ALT: 76 U/L / AST: 44 U/L / GGT: x           PT/INR - ( 2018 06:24 )   PT: 13.0 sec;   INR: 1.19 ratio         PTT - ( 2018 06:24 )  PTT:34.6 sec  CAPILLARY BLOOD GLUCOSE      CAPILLARY BLOOD GLUCOSE      CARDIAC MARKERS ( 2018 18:44 )  .035 ng/mL / x     / 43 U/L / x     / 1.4 ng/mL      Urinalysis Basic - ( 2018 01:02 )    Color: Yellow / Appearance: Turbid / S.010 / pH: x  Gluc: x / Ketone: Negative  / Bili: Negative / Urobili: Negative   Blood: x / Protein: 500 mg/dL / Nitrite: Negative   Leuk Esterase: Moderate / RBC: 11-25 /HPF / WBC 11-25   Sq Epi: x / Non Sq Epi: Moderate / Bacteria: TNTC                Rapid RVP Result: NotDetec ( @ 21:03)    ABG - ( 2018 06:45 )  pH: 7.39  /  pCO2: 31    /  pO2: 141   / HCO3: 20    / Base Excess: -6.0  /  SaO2: 99            Radiology:    < from: US Renal (18 @ 12:58) >  Echogenic kidneys compatible with medical renal disease. No   hydronephrosis.    9 mm nonobstructing right-sided intrarenal stone off the lower pole of   the kidney.    Gallstones and nonspecific gallbladder wall thickening.     < end of copied text >    < from: Xray Abdomen 1 View PORTABLE -Routine (18 @ 11:48) >  Nonspecific bowel gas pattern.    < end of copied text >      CENTRAL LINE: N    ISABEL: n    A-LINE: n    GLOBAL ISSUE/BEST PRACTICE:  Analgesia: n  Sedation: n  HOB elevation: yes  Stress ulcer prophylaxis: y  VTE prophylaxis: y  Glycemic control: n  Nutrition: diet    CODE STATUS: Full code

## 2018-02-08 NOTE — PROGRESS NOTE ADULT - ATTENDING COMMENTS
Thank you for the courtesy of this referral.     I'll sign off at this time. Dr. PALMA Coyne returns 2/12/18.    Jovan Will MD  [phone]

## 2018-02-08 NOTE — PROGRESS NOTE ADULT - ASSESSMENT
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 Bradycardia was likely in setting of metabolic derrangements. ; now in SR, extubated, no evidence for ADHF      -  cont metop 25mg tid    - currently off of pressors.   - Try to maintain MAP >65 to allow for most perfusion to lower ext  - cont hydralazine  - DVT prophylaxis  - ASA  - Monitor and replete electrolytes. Keep K>4.0 and Mg>2.0.   - Further cardiac workup will depend on clinical course.

## 2018-02-08 NOTE — PROGRESS NOTE ADULT - ASSESSMENT
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 Pt daughter, bp meds (BB, and Ca++ channel blocker and diuretic) recently increased by cardiology.    Neuro: mentating well, back to baseline, no issues, improving delirium likely 2/2 ICU stay/sedatives, avoid pharmacologic measures if possible, decreased appetite in setting of ?depression, c/w mirtazapine 7.5   Cardio: HD stable, c/w metoprolol 25 TID well, no more bradycardia, c/w hydralazine for HTN, c/w asa, statin, BRIDGET resolved, consider restarted home ARB  pulm: resolved resp failure, sating well on NC  GI: dysphagia I advanced as tolerated, ppi, shock liver resolved, no further diarrhea  Renal: BRIDGET resolved likely 2/2 hypoperfusion from shock/bradycardia, f/u urine lytes  DVT ppx. 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 Pt daughter, bp meds (BB, and Ca++ channel blocker and diuretic) recently increased by cardiology.    Neuro: mentating well, back to baseline, no issues, improving delirium, was likely 2/2 ICU stay/sedatives, avoid pharmacologic measures if possible, decreased appetite in setting of ?depression, c/w mirtazapine 7.5   Cardio: HD stable, increase metoprolol 25 QID, no more bradycardia, c/w hydralazine for HTN, c/w asa, statin, BRIDGET resolved, will restart ARB on discharge  pulm: resolved resp failure, sating well on NC  GI: dysphagia I advanced as tolerated, ppi, shock liver resolved, no further diarrhea  Renal: BRIDGET resolved likely 2/2 hypoperfusion from shock/bradycardia, f/u urine lytes  DVT ppx. INR subtherapeutic am, will give dose of warfarin tonight, c/w full dose lovenox for bridge   ID: ESBL e.coli UTI, given ertapenem x1 yesterday, seems improving/will observe off abx per ID  Endo: TFTs unremarkable, f/u random cortisol level  Dispo: full code  PT eval -> likely for TIFFANIE

## 2018-02-08 NOTE — PROGRESS NOTE ADULT - SUBJECTIVE AND OBJECTIVE BOX
Follow up: AF, MVP, PAD, pauses    HPI:  82F PMHx Afib on Coumadin, HLD, CVA (1990's), MVP, OA, HTN, RA, PVD (s/p RLE bypassx3, most recently 2012), Neuropathy, hx OM BIBEMS from home with cc of diarrhea found to be bradycardic and hypotensive. Unable to obtain any history from patient as she was intubated prior to my evaluation. As per daughters at bedside, patient was complaining of watery diarrhea for past couple weeks, which seemed to be resolving. Patient was doing well last they had spoke to her earlier this morning until about 3pm when aid felt patient seemed very weak and tired and called ambulance. Of note per daughters, patient had seen her cardiologist about 10 days prior, was fount to be hypertensive at which time at diuretic was added and two of her antihypertensive medication doses were increased.   In the ED, patient was found to be severely bradycardic with HR in 30's and hypotensive with blood pressure around 50-60 systolic and diastolic 30-40's. Received 3.5L NS, Zosyn x1, Calcium gluconate, Humalog, Zofran x1. EKG showed HR 40bpm with undetermined rhythm, septal infarct age undetermined. Patient was intubated and is s/p transcutaneous pacemaker placement by ICU in ED. Started on Dopamine and Levophed drip, admitted to ICU. (04 Feb 2018 21:17)      She is now extubated and arousable with no new cardiac complaints overnight. He is off pressors and is in sinus rhythm with no recurrent tachycardia.    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  Osteoarthritis  Afib  PVD (peripheral vascular disease)  Neuropathy: (Right lower leg)  H/O cerebral aneurysm repair: brain clips  CVA (cerebral vascular accident): (&quot;Mini-stroke&quot;,1990&#x27;s)  Rheumatoid arthritis  Hyperlipidemia  Hypertension  S/P cataract surgery: (Left eye)  Elective surgery: (&quot;Twisted bowel&quot;, 2014)  Elective surgery: (Exision of cyst on liver, 1985)  S/P ORIF (open reduction internal fixation) fracture: Left hip fx (2012) &amp; R hip fx (2013)  H/O cerebral aneurysm repair: Brain clips (1978(  Renal stone: Cysto stent placement 10/1/2014  PVD (peripheral vascular disease): s/p RLE bypass x 3, most recent 3/2012 Right external iliac to PT bypass w/ PTFE (2012)  S/P FRED (total abdominal hysterectomy): (1987, Hx of &quot;ovarian cancer?&quot;)      MEDICATIONS  (STANDING):  aspirin enteric coated 81 milliGRAM(s) Oral daily  atorvastatin 40 milliGRAM(s) Oral at bedtime  enoxaparin Injectable 50 milliGRAM(s) SubCutaneous daily  hydrALAZINE 50 milliGRAM(s) Oral every 8 hours  metoprolol     tartrate 25 milliGRAM(s) Oral every 8 hours  mirtazapine 7.5 milliGRAM(s) Oral at bedtime  pantoprazole  Injectable 40 milliGRAM(s) IV Push daily    Vital Signs Last 24 Hrs  T(C): 36.6 (08 Feb 2018 08:13), Max: 37.1 (08 Feb 2018 04:55)  T(F): 97.8 (08 Feb 2018 08:13), Max: 98.7 (08 Feb 2018 04:55)  HR: 90 (08 Feb 2018 07:00) (71 - 104)  BP: 143/69 (08 Feb 2018 07:00) (138/69 - 190/89)  BP(mean): 99 (08 Feb 2018 07:00) (97 - 131)  RR: 18 (08 Feb 2018 07:00) (15 - 28)  SpO2: 96% (08 Feb 2018 07:00) (92% - 98%)    I&O's Summary    07 Feb 2018 07:01  -  08 Feb 2018 07:00  --------------------------------------------------------  IN: 1240 mL / OUT: 1050 mL / NET: 190 mL        PHYSICAL EXAM:    Constitutional: NAD, awake and alert, well-developed  Eyes:  EOMI,  Pupils round, no lesions  ENMT: no exudate or erythema  Pulmonary: Non-labored, breath sounds are clear bilaterally, No wheezing, rales or rhonchi  Cardiovascular: PMI not palpable RRR normal S1 and S2, no murmurs, rubs, gallops or clicks  Gastrointestinal: Bowel Sounds present, soft, nontender.   Lymph: No cervical lymphadenopathy.  Neurological: Alert, no focal deficits  Skin: No rashes.  No cyanosis.  Psych:  Mood & affect appropriate   Ext: No lower ext edema                                10.0   8.6   )-----------( 168      ( 08 Feb 2018 06:24 )             32.5     02-08    146<H>  |  110<H>  |  20  ----------------------------<  96  3.5   |  27  |  1.20    Ca    8.5      08 Feb 2018 06:24  Phos  2.8     02-08  Mg     2.2     02-08    TPro  6.1  /  Alb  2.8<L>  /  TBili  0.8  /  DBili  x   /  AST  44<H>  /  ALT  76  /  AlkPhos  100  02-08    < from: 12 Lead ECG (02.04.18 @ 18:06) >    Ventricular Rate 40 BPM    Atrial Rate 37 BPM    QRS Duration 96 ms    Q-T Interval 554 ms    QTC Calculation(Bezet) 451 ms    R Axis -25 degrees    T Axis -25 degrees    Diagnosis Line Undetermined rhythm , no p-waves seen and R-R interval largelyregular. Possible sinus arrest with slow junctional escape.   Septal infarct (cited on or before 04-FEB-2018)  Abnormal ECG    Confirmed by Alvaro Kasper ([id number]) on 2/5/2018 9:58:30 AM      Telemetry currently reveals sinus rhythm at 90 beats per minute.    < from: Xray Chest 1 View-PORTABLE IMMEDIATE (02.05.18 @ 00:16) >    EXAM:  XR CHEST PORTABLE IMMED 1V                            PROCEDURE DATE:  02/05/2018          INTERPRETATION:  OG tube and repositioning of ET tube.    AP chest. Prior dated 2/4/2016.    Endotracheal tube has been retracted slightly and now with tip just above   the level of the rachel. Nasogastric tube in satisfactory position.   Otherwise no change.    Impression: As above                ELIZABETH MICHELLE M.D., ATTENDING RADIOLOGIST  This document has been electronically signed. Feb 5 2018 9:02AM                < end of copied text >  < from: TTE Echo Doppler w/o Cont (02.05.18 @ 08:11) >     EXAM:  ECHO TTE W/O CON COMP W/DOPPLR         PROCEDURE DATE:  02/05/2018        INTERPRETATION:  INDICATION: respiratory failure  Referring M.D.:Lona  Blood Pressure 126/59        Weight (kg) :49     Height (cm):157       BSA (sq m): 1.47  Technician: JOJO    Dimensions:    LA 3.4       Normal Values: 2.0 - 4.0 cm    Ao 3.0        Normal Values: 2.0 - 3.8 cm  SEPTUM 0.8       Normal Values: 0.6 - 1.2 cm  PWT 0.8       Normal Values: 0.6 - 1.1 cm  LVIDd 3.4         Normal Values: 3.0 - 5.6 cm  LVIDs 2.3         Normal Values: 1.8 - 4.0 cm      OBSERVATIONS:  Technically difficult bedside ICU study. The patient is vented  Mitral Valve: Calcified mitral annulus and mitral valve leaflets with   normal opening. Mild mitral regurgitation.  Aortic Valve/Aorta: Calcified trileaflet aortic valve with normal opening.  Tricuspid Valve: Calcified trileaflet cusp at annulus with normally   opening valve leaflets. Mild tricuspid regurgitation.  Pulmonic Valve: The pulmonic valve is not well visualized. Probably   normal. Mild PI  Left Atrium: Small to moderate left atrial enlargement  Right Atrium: Normal  Left Ventricle: The endocardium is not well-visualized. There appears to   be grossly preserved left ventricular systolic function. There appears to   be concentric left ventricular hypertrophy. The EF is approximately 65%.  Right Ventricle: Normal right ventricular size and function. There is a   device wire noted in the right heart  Pericardium/Pleura: No pericardial effusion noted. Left pleural effusion   noted  Pulmonary/RV Pressure: The right ventricular systolic pressure is   estimated to be 41mmHg, assuming that the right atrial pressure is   estimated to be 8 mmHg. This is consistent with mild pulmonary   hypertension.  LV Diastolic Function: Stage II diastolic dysfunction    Conclusion: Likely difficult and limited study. Grossly preserved left   ventricular systolic function. Sensation diastolic dysfunction. Left   pleural effusion is noted.                  KIET NIETO M.D., ATTENDING CARDIOLOGIST  This document has been electronically signed. Feb 6 2018  7:49PM                < end of copied text >

## 2018-02-08 NOTE — PROGRESS NOTE ADULT - SUBJECTIVE AND OBJECTIVE BOX
[facility], Division of Infectious Diseases  Emerald Will, CARLOS Rios    Covering Dr. Brain Coyne.     Name: CARITO CONSTANTINO  Age: 82y  Gender: Female  MRN: [id number]    Interval History--  Notes reviewed. Comfortable in ICU, on RA. No complaints.     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  Osteoarthritis  Afib  PVD (peripheral vascular disease)  Neuropathy  Gout  H/O cerebral aneurysm repair  CVA (cerebral vascular accident)  Rheumatoid arthritis  Asthma  Hyperlipidemia  Hypertension  S/P cataract surgery  Elective surgery  Elective surgery  S/P ORIF (open reduction internal fixation) fracture  H/O cerebral aneurysm repair  Renal stone  PVD (peripheral vascular disease)  S/P FRED (total abdominal hysterectomy)      For details regarding the patient's social history, family history, and other miscellaneous elements, please refer the initial infectious diseases consultation and/or the admitting history and physical examination for this admission.    Allergies    No Known Allergies    Intolerances        Medications--  Antibiotics:     Immunologic:    Other:  aspirin enteric coated  atorvastatin  enoxaparin Injectable  hydrALAZINE  metoprolol     tartrate  mirtazapine  pantoprazole  Injectable  potassium chloride    Tablet ER  warfarin      Review of Systems--  A 10-point review of systems was obtained.     Pertinent positives and negatives--  Constitutional: No fevers. No Chills. No Rigors.   Cardiovascular: No chest pain. No palpitations.  Respiratory: No shortness of breath. No cough.  Gastrointestinal: No nausea or vomiting. No diarrhea or constipation.   Psychiatric: Pleasant. Appropriate affect.    Review of systems otherwise negative except as previously noted.    Physical Examination--  Vital Signs: T(F): 97.8 (02-08-18 @ 08:13), Max: 98.7 (02-08-18 @ 04:55)  HR: 93 (02-08-18 @ 09:00)  BP: 174/85 (02-08-18 @ 09:00)  RR: 23 (02-08-18 @ 09:00)  SpO2: 95% (02-08-18 @ 09:00)  Wt(kg): --  General: Nontoxic-appearing Female in no acute distress.  HEENT: AT/NC. Anicteric. Conjunctiva pink and moist. Oropharynx clear.   Neck: Not rigid. No sense of mass. RIJ site C/D/I, transducer removed.  Nodes: None palpable.  Lungs: Poor effort bilaterally without rales, wheezing or rhonchi  Heart: Regular rate and rhythm. No Murmur. No rub. No gallop. No palpable thrill.  Abdomen: Bowel sounds present and normoactive. Soft. Nondistended. Nontender. No sense of mass. No organomegaly.  Extremities: No cyanosis or clubbing. No edema.   Skin: Warm. Dry. Good turgor. No rash. No vasculitic stigmata.  Psychiatric: Less depressed mood/affect      Laboratory Studies--  CBC                        10.0   8.6   )-----------( 168      ( 08 Feb 2018 06:24 )             32.5       Chemistries  02-08    146<H>  |  110<H>  |  20  ----------------------------<  96  3.5   |  27  |  1.20    Ca    8.5      08 Feb 2018 06:24  Phos  2.8     02-08  Mg     2.2     02-08    TPro  6.1  /  Alb  2.8<L>  /  TBili  0.8  /  DBili  x   /  AST  44<H>  /  ALT  76  /  AlkPhos  100  02-08      Culture Data    Culture - Urine (collected 05 Feb 2018 08:17)  Source: .Urine Catheterized  Final Report (07 Feb 2018 09:08):    10,000 - 49,000 CFU/mL Escherichia coli ESBL  Organism: Escherichia coli ESBL (07 Feb 2018 09:08)  Organism: Escherichia coli ESBL (07 Feb 2018 09:08)    Culture - Blood (collected 05 Feb 2018 00:36)  Source: .Blood Blood-Peripheral  Preliminary Report (06 Feb 2018 01:03):    No growth to date.    Culture - Blood (collected 05 Feb 2018 00:36)  Source: .Blood Blood-Peripheral  Preliminary Report (06 Feb 2018 01:03):    No growth to date.

## 2018-02-08 NOTE — PROGRESS NOTE ADULT - PROBLEM SELECTOR PLAN 3
Would not treat at this time  Contact precautions per protocol  Discussed with Dr. Hendrickson yesterday. Ertapenem cancelled

## 2018-02-09 DIAGNOSIS — E78.5 HYPERLIPIDEMIA, UNSPECIFIED: ICD-10-CM

## 2018-02-09 DIAGNOSIS — I10 ESSENTIAL (PRIMARY) HYPERTENSION: ICD-10-CM

## 2018-02-09 DIAGNOSIS — I48.0 PAROXYSMAL ATRIAL FIBRILLATION: ICD-10-CM

## 2018-02-09 DIAGNOSIS — N17.9 ACUTE KIDNEY FAILURE, UNSPECIFIED: ICD-10-CM

## 2018-02-09 LAB
ALBUMIN SERPL ELPH-MCNC: 2.7 G/DL — LOW (ref 3.3–5)
ALP SERPL-CCNC: 95 U/L — SIGNIFICANT CHANGE UP (ref 40–120)
ALT FLD-CCNC: 58 U/L — SIGNIFICANT CHANGE UP (ref 12–78)
ANION GAP SERPL CALC-SCNC: 6 MMOL/L — SIGNIFICANT CHANGE UP (ref 5–17)
APTT BLD: 35.1 SEC — SIGNIFICANT CHANGE UP (ref 27.5–37.4)
AST SERPL-CCNC: 28 U/L — SIGNIFICANT CHANGE UP (ref 15–37)
BASOPHILS # BLD AUTO: 0 K/UL — SIGNIFICANT CHANGE UP (ref 0–0.2)
BASOPHILS NFR BLD AUTO: 0.6 % — SIGNIFICANT CHANGE UP (ref 0–2)
BILIRUB SERPL-MCNC: 0.7 MG/DL — SIGNIFICANT CHANGE UP (ref 0.2–1.2)
BUN SERPL-MCNC: 22 MG/DL — SIGNIFICANT CHANGE UP (ref 7–23)
CALCIUM SERPL-MCNC: 8.6 MG/DL — SIGNIFICANT CHANGE UP (ref 8.5–10.1)
CHLORIDE SERPL-SCNC: 111 MMOL/L — HIGH (ref 96–108)
CO2 SERPL-SCNC: 28 MMOL/L — SIGNIFICANT CHANGE UP (ref 22–31)
CREAT SERPL-MCNC: 1.2 MG/DL — SIGNIFICANT CHANGE UP (ref 0.5–1.3)
EOSINOPHIL # BLD AUTO: 0.2 K/UL — SIGNIFICANT CHANGE UP (ref 0–0.5)
EOSINOPHIL NFR BLD AUTO: 2.7 % — SIGNIFICANT CHANGE UP (ref 0–6)
GLUCOSE SERPL-MCNC: 91 MG/DL — SIGNIFICANT CHANGE UP (ref 70–99)
HCT VFR BLD CALC: 31.7 % — LOW (ref 34.5–45)
HCT VFR BLD CALC: 31.9 % — LOW (ref 34.5–45)
HGB BLD-MCNC: 10 G/DL — LOW (ref 11.5–15.5)
HGB BLD-MCNC: 10 G/DL — LOW (ref 11.5–15.5)
INR BLD: 1.19 RATIO — HIGH (ref 0.88–1.16)
LYMPHOCYTES # BLD AUTO: 1.7 K/UL — SIGNIFICANT CHANGE UP (ref 1–3.3)
LYMPHOCYTES # BLD AUTO: 22.1 % — SIGNIFICANT CHANGE UP (ref 13–44)
MAGNESIUM SERPL-MCNC: 2 MG/DL — SIGNIFICANT CHANGE UP (ref 1.6–2.6)
MCHC RBC-ENTMCNC: 27.9 PG — SIGNIFICANT CHANGE UP (ref 27–34)
MCHC RBC-ENTMCNC: 31.6 GM/DL — LOW (ref 32–36)
MCV RBC AUTO: 88.5 FL — SIGNIFICANT CHANGE UP (ref 80–100)
MONOCYTES # BLD AUTO: 0.6 K/UL — SIGNIFICANT CHANGE UP (ref 0–0.9)
MONOCYTES NFR BLD AUTO: 7.7 % — SIGNIFICANT CHANGE UP (ref 1–9)
NEUTROPHILS # BLD AUTO: 5.2 K/UL — SIGNIFICANT CHANGE UP (ref 1.8–7.4)
NEUTROPHILS NFR BLD AUTO: 67 % — SIGNIFICANT CHANGE UP (ref 43–77)
PHOSPHATE SERPL-MCNC: 2.9 MG/DL — SIGNIFICANT CHANGE UP (ref 2.5–4.5)
PLATELET # BLD AUTO: 174 K/UL — SIGNIFICANT CHANGE UP (ref 150–400)
POTASSIUM SERPL-MCNC: 4.1 MMOL/L — SIGNIFICANT CHANGE UP (ref 3.5–5.3)
POTASSIUM SERPL-SCNC: 4.1 MMOL/L — SIGNIFICANT CHANGE UP (ref 3.5–5.3)
PROT SERPL-MCNC: 6.2 G/DL — SIGNIFICANT CHANGE UP (ref 6–8.3)
PROTHROM AB SERPL-ACNC: 13 SEC — HIGH (ref 9.8–12.7)
RBC # BLD: 3.58 M/UL — LOW (ref 3.8–5.2)
RBC # FLD: 15.4 % — HIGH (ref 10.3–14.5)
SODIUM SERPL-SCNC: 145 MMOL/L — SIGNIFICANT CHANGE UP (ref 135–145)
WBC # BLD: 7.7 K/UL — SIGNIFICANT CHANGE UP (ref 3.8–10.5)
WBC # FLD AUTO: 7.7 K/UL — SIGNIFICANT CHANGE UP (ref 3.8–10.5)

## 2018-02-09 PROCEDURE — 99233 SBSQ HOSP IP/OBS HIGH 50: CPT

## 2018-02-09 PROCEDURE — 99233 SBSQ HOSP IP/OBS HIGH 50: CPT | Mod: GC

## 2018-02-09 RX ORDER — WARFARIN SODIUM 2.5 MG/1
3 TABLET ORAL ONCE
Qty: 0 | Refills: 0 | Status: COMPLETED | OUTPATIENT
Start: 2018-02-09 | End: 2018-02-09

## 2018-02-09 RX ADMIN — PANTOPRAZOLE SODIUM 40 MILLIGRAM(S): 20 TABLET, DELAYED RELEASE ORAL at 12:49

## 2018-02-09 RX ADMIN — Medication 50 MILLIGRAM(S): at 05:16

## 2018-02-09 RX ADMIN — MIRTAZAPINE 7.5 MILLIGRAM(S): 45 TABLET, ORALLY DISINTEGRATING ORAL at 22:00

## 2018-02-09 RX ADMIN — Medication 81 MILLIGRAM(S): at 12:47

## 2018-02-09 RX ADMIN — ENOXAPARIN SODIUM 50 MILLIGRAM(S): 100 INJECTION SUBCUTANEOUS at 13:42

## 2018-02-09 RX ADMIN — WARFARIN SODIUM 3 MILLIGRAM(S): 2.5 TABLET ORAL at 22:00

## 2018-02-09 RX ADMIN — Medication 25 MILLIGRAM(S): at 12:47

## 2018-02-09 RX ADMIN — Medication 50 MILLIGRAM(S): at 13:45

## 2018-02-09 RX ADMIN — ATORVASTATIN CALCIUM 40 MILLIGRAM(S): 80 TABLET, FILM COATED ORAL at 22:01

## 2018-02-09 RX ADMIN — Medication 25 MILLIGRAM(S): at 17:41

## 2018-02-09 RX ADMIN — Medication 50 MILLIGRAM(S): at 22:01

## 2018-02-09 RX ADMIN — Medication 25 MILLIGRAM(S): at 05:16

## 2018-02-09 NOTE — PROGRESS NOTE ADULT - PROBLEM SELECTOR PLAN 9
- Lovenox subQ bridge to coumadin 3 renally dosed  - PT re-evaluation needed  - D/c planning soon - Lovenox subQ bridge to coumadin 3 renally dosed  - PT re-evaluation needed  - D/c planning

## 2018-02-09 NOTE — PROGRESS NOTE ADULT - SUBJECTIVE AND OBJECTIVE BOX
[facility]    Dahiana Bañuelos, Evens, Toney, Terrie, Truman, Filiberto      [phone]    CHIEF COMPLAINT: Patient is a 82y old  Female who presents with a chief complaint of diarrhea  x 1 week, weakness (07 Feb 2018 13:38)      Follow Up: af, cva, pad, bradycardia    Interim history: still coughing. denies cp.    MEDICATIONS  (STANDING):  aspirin enteric coated 81 milliGRAM(s) Oral daily  atorvastatin 40 milliGRAM(s) Oral at bedtime  enoxaparin Injectable 50 milliGRAM(s) SubCutaneous daily  hydrALAZINE 50 milliGRAM(s) Oral every 8 hours  metoprolol     tartrate 25 milliGRAM(s) Oral every 6 hours  mirtazapine 7.5 milliGRAM(s) Oral at bedtime  pantoprazole    Tablet 40 milliGRAM(s) Oral daily  warfarin 3 milliGRAM(s) Oral once    MEDICATIONS  (PRN):      REVIEW OF SYSTEMS:  eye, ent, GI, , allergic, dermatologic, musculoskeletal and neurologic are negative except as described above    Vital Signs Last 24 Hrs  T(C): 36.4 (09 Feb 2018 17:10), Max: 36.7 (08 Feb 2018 21:00)  T(F): 97.6 (09 Feb 2018 17:10), Max: 98.1 (08 Feb 2018 21:00)  HR: 101 (09 Feb 2018 17:10) (86 - 107)  BP: 149/82 (09 Feb 2018 17:10) (120/81 - 179/93)  BP(mean): --  RR: 18 (09 Feb 2018 17:10) (16 - 18)  SpO2: 97% (09 Feb 2018 17:10) (96% - 98%)    I&O's Summary    08 Feb 2018 07:01  -  09 Feb 2018 07:00  --------------------------------------------------------  IN: 560 mL / OUT: 0 mL / NET: 560 mL        Telemetry past 24h: sr, brief af, with rvr    PHYSICAL EXAM:    Constitutional: well-nourished, well-developed, NAD   HEENT:  MMM, sclerae anicteric, conjunctivae clear, no oral cyanosis.  Pulmonary: Non-labored, breath sounds are clear bilaterally, No wheezing, rales or rhonchi  Cardiovascular: regular, S1 and S2.  No murmur.  No rubs, gallops or clicks  Gastrointestinal: Bowel Sounds present, soft, nontender.   Lymph: No peripheral edema.   Neurological: Alert, no focal deficits  Skin: No rashes.  Psych:  Mood & affect appropriate    LABS: All Labs Reviewed:                        10.0   7.7   )-----------( 174      ( 09 Feb 2018 07:13 )             31.7                         10.0   8.6   )-----------( 168      ( 08 Feb 2018 06:24 )             32.5                         8.8    11.3  )-----------( 162      ( 07 Feb 2018 06:25 )             27.5     09 Feb 2018 07:13    145    |  111    |  22     ----------------------------<  91     4.1     |  28     |  1.20   08 Feb 2018 06:24    146    |  110    |  20     ----------------------------<  96     3.5     |  27     |  1.20   07 Feb 2018 06:25    148    |  116    |  28     ----------------------------<  86     3.1     |  24     |  1.40     Ca    8.6        09 Feb 2018 07:13  Ca    8.5        08 Feb 2018 06:24  Ca    7.9        07 Feb 2018 06:25  Phos  2.9       09 Feb 2018 07:13  Phos  2.8       08 Feb 2018 06:24  Phos  2.4       07 Feb 2018 06:25  Mg     2.0       09 Feb 2018 07:13  Mg     2.2       08 Feb 2018 06:24  Mg     1.9       07 Feb 2018 06:25    TPro  6.2    /  Alb  2.7    /  TBili  0.7    /  DBili  x      /  AST  28     /  ALT  58     /  AlkPhos  95     09 Feb 2018 07:13  TPro  6.1    /  Alb  2.8    /  TBili  0.8    /  DBili  x      /  AST  44     /  ALT  76     /  AlkPhos  100    08 Feb 2018 06:24  TPro  5.3    /  Alb  2.3    /  TBili  0.5    /  DBili  x      /  AST  50     /  ALT  85     /  AlkPhos  86     07 Feb 2018 06:25    PT/INR - ( 09 Feb 2018 07:13 )   PT: 13.0 sec;   INR: 1.19 ratio         PTT - ( 09 Feb 2018 07:13 )  PTT:35.1 sec      Blood Culture: Organism Escherichia coli ESBL  Gram Stain Blood -- Gram Stain --  Specimen Source .Urine Catheterized  Culture-Blood --    Organism --  Gram Stain Blood -- Gram Stain --  Specimen Source .Blood Blood-Peripheral  Culture-Blood --            RADIOLOGY:    EKG:    Echo:    < from: TTE Echo Doppler w/o Cont (02.05.18 @ 08:11) >     EXAM:  ECHO TTE W/O CON COMP W/DOPPLR         PROCEDURE DATE:  02/05/2018        INTERPRETATION:  INDICATION: respiratory failure  Referring M.D.:Lona  Blood Pressure 126/59        Weight (kg) :49     Height (cm):157       BSA (sq m): 1.47  Technician: JOJO    Dimensions:    LA 3.4       Normal Values: 2.0 - 4.0 cm    Ao 3.0        Normal Values: 2.0 - 3.8 cm  SEPTUM 0.8       Normal Values: 0.6 - 1.2 cm  PWT 0.8       Normal Values: 0.6 - 1.1 cm  LVIDd 3.4         Normal Values: 3.0 - 5.6 cm  LVIDs 2.3         Normal Values: 1.8 - 4.0 cm      OBSERVATIONS:  Technically difficult bedside ICU study. The patient is vented  Mitral Valve: Calcified mitral annulus and mitral valve leaflets with   normal opening. Mild mitral regurgitation.  Aortic Valve/Aorta: Calcified trileaflet aortic valve with normal opening.  Tricuspid Valve: Calcified trileaflet cusp at annulus with normally   opening valve leaflets. Mild tricuspid regurgitation.  Pulmonic Valve: The pulmonic valve is not well visualized. Probably   normal. Mild PI  Left Atrium: Small to moderate left atrial enlargement  Right Atrium: Normal  Left Ventricle: The endocardium is not well-visualized. There appears to   be grossly preserved left ventricular systolic function. There appears to   be concentric left ventricular hypertrophy. The EF is approximately 65%.  Right Ventricle: Normal right ventricular size and function. There is a   device wire noted in the right heart  Pericardium/Pleura: No pericardial effusion noted. Left pleural effusion   noted  Pulmonary/RV Pressure: The right ventricular systolic pressure is   estimated to be 41mmHg, assuming that the right atrial pressure is   estimated to be 8 mmHg. This is consistent with mild pulmonary   hypertension.  LV Diastolic Function: Stage II diastolic dysfunction    Conclusion: Likely difficult and limited study. Grossly preserved left   ventricular systolic function. Sensation diastolic dysfunction. Left   pleural effusion is noted.                  KIET NIETO M.D., ATTENDING CARDIOLOGIST  This document has been electronically signed. Feb 6 2018  7:49PM                < end of copied text >

## 2018-02-09 NOTE — CHART NOTE - NSCHARTNOTEFT_GEN_A_CORE
Notified by Rn that patient has swelling in left arm. Patient seen and examined at bedside. Warm, hard, swollen area present in left antecubital region. Tender to palpation. Ecchymoses present. Patient had ultrasound of left UE performed 2/7/18 and was negative for DVT. Patient is currently on Lovenox and coumadin Notified by Rn that patient has swelling in left arm. Patient seen and examined at bedside. Warm, hard, swollen area present in left antecubital region. Tender to palpation. Ecchymoses present. Patient had ultrasound of left UE performed 2/7/18 and was negative for DVT. Patient is currently on Lovenox and coumadin. Will keep arm elevated and apply warm compress for hematoma of L UE. VSS. Will do stat h/h. If H/h drops, will d/c ac. RN to call if any changes. Will continue to monitor.     Shaan Hayden DO, PGY1

## 2018-02-09 NOTE — PROGRESS NOTE ADULT - ATTENDING COMMENTS
I personally conducted a physical examination of the patient. I personally gathered the patient's history. I edited the above listed findings which were prepared by the listed resident physician. I personally discussed the plan of care with the patient. The questions and concerns were addressed to the best of my ability. The patient is in agreement with the listed treatment plan.     A/P: pt downgraded from ICU. plan to bridge to warfarin w/ lovenox. pt ambulating well. requested PT re-eval. TIFFANIE vs. home w/ home PT. spoke w/ daughter/pt at bedside. d/c planning once INR therapeutic

## 2018-02-09 NOTE — PROGRESS NOTE ADULT - SUBJECTIVE AND OBJECTIVE BOX
Resident Progress Note    Patient is a 82y old  Female who presents with a chief complaint of diarrhea  x 1 week, weakness (2018 13:38)      HPI: 82F PMHx Afib on Coumadin, HLD, CVA (), MVP, OA, HTN, RA, PVD (s/p RLE bypassx3, most recently ), Neuropathy, hx OM BIBEMS from home with cc of diarrhea found to be bradycardic and hypotensive. Unable to obtain any history from patient as she was intubated prior to my evaluation. As per daughters at bedside, patient was complaining of watery diarrhea for past couple weeks, which seemed to be resolving. Patient was doing well last they had spoke to her earlier this morning until about 3pm when aid felt patient seemed very weak and tired and called ambulance. Of note per daughters, patient had seen her cardiologist about 10 days prior, was fount to be hypertensive at which time at diuretic was added and two of her antihypertensive medication doses were increased.   In the ED, patient was found to be severely bradycardic with HR in 30's and hypotensive with blood pressure around 50-60 systolic and diastolic 30-40's. Received 3.5L NS, Zosyn x1, Calcium gluconate, Humalog, Zofran x1. EKG showed HR 40bpm with undetermined rhythm, septal infarct age undetermined. Patient was intubated and is s/p transcutaneous pacemaker placement by ICU in ED. Started on Dopamine and Levophed drip, admitted to ICU. (2018 21:17)    She is now extubated, acidosis, hypotension, and bradycardia all resolved, off of temporary pacer. She is off pressors and is in sinus rhythm.    Overnight: no acute events overnight. Patient seen and examined at bedside. Patient denies diarrhea, reports having an episode of soft, solid stool yesterday. She was seen eating breakfast, is feeling well, and wants to go home. She has no complaints at this time.     Tele: 1 episode of tachycardia at 135, but is now NS @ 80s.    ROS:  Constitutional: denies fever, chills, diaphoresis   HEENT: denies blurry vision, difficulty hearing  Respiratory: denies SOB, CRABTREE, cough, sputum production, wheezing, hemoptysis  Cardiovascular: denies chest pain, palpitations, edema  Gastrointestinal: denies nausea, vomiting, diarrhea, constipation, abdominal pain, melena, hematochezia   Genitourinary: denies dysuria, frequency, urgency, hematuria   Skin/Breast: denies rash, itching  Musculoskeletal: denies myalgias, joint swelling, muscle weakness  Neurologic: denies headache, weakness, dizziness, paresthesias, numbness/tingling  ROS negative except as noted above    Vital Signs Last 24 Hrs  T(C): 36.4 (18 @ 07:13), Max: 36.7 (18 @ 21:00)  T(F): 97.6 (18 @ 07:13), Max: 98.1 (18 @ 21:00)  HR: 107 (18 @ 12:41) (86 - 117)  BP: 130/82 (18 @ 12:41) (130/82 - 179/93)  BP(mean): 106 (18 @ 16:00) (103 - 113)  RR: 18 (18 @ 12:41) (16 - 32)  SpO2: 98% (18 @ 12:41) (96% - 98%)    I&O's Summary    2018 07:01  -  2018 07:00  --------------------------------------------------------  IN: 560 mL / OUT: 0 mL / NET: 560 mL      Physical Exam:  General: Well developed, well nourished, NAD  HEENT: NCAT, PERRLA, EOMI bl, moist mucous membranes   Neck: Supple, nontender, no mass  Neurology: A&Ox3, nonfocal, CN II-XII grossly intact, sensation intact, no gait abnormalities   Respiratory: CTA B/L, No W/R/R  CV: RRR, +S1/S2, no murmurs, rubs or gallops  Abdominal: Soft, NT, ND +BSx4  Extremities: chronic discoloration of bilateral LE due to PVD (R worse than L), unable to palpate DP and PT pulses for b/l LE, b/l UE wnl  MSK: Normal ROM, no joint erythema or warmth, no joint swelling   Skin: warm, dry, normal color    LABS:                        10.0   7.7   )-----------( 174      ( 2018 07:13 )             31.7     2018 07:13    145    |  111    |  22     ----------------------------<  91     4.1     |  28     |  1.20     Ca    8.6        2018 07:13  Phos  2.9       2018 07:13  Mg     2.0       2018 07:13    TPro  6.2    /  Alb  2.7    /  TBili  0.7    /  DBili  x      /  AST  28     /  ALT  58     /  AlkPhos  95     2018 07:13    PT/INR - ( 2018 07:13 )   PT: 13.0 sec;   INR: 1.19 ratio    PTT - ( 2018 07:13 )  PTT:35.1 sec      Urinalysis Basic - ( 2018 01:02 )    Color: Yellow / Appearance: Turbid / S.010 / pH: x  Gluc: x / Ketone: Negative  / Bili: Negative / Urobili: Negative   Blood: x / Protein: 500 mg/dL / Nitrite: Negative   Leuk Esterase: Moderate / RBC: 11-25 /HPF / WBC 11-25   Sq Epi: x / Non Sq Epi: Moderate / Bacteria: TNTC    Urine culture: + 10K-40K E coli ESBL    Rapid RVP Result: Amarilystec ( @ 21:03)    ABG - ( 2018 06:45 )  pH: 7.39  /  pCO2: 31    /  pO2: 141   / HCO3: 20    / Base Excess: -6.0  /  SaO2: 99        CAPILLARY BLOOD GLUCOSE          RADIOLOGY & ADDITIONAL TESTS:    MEDICATIONS  (STANDING):  aspirin enteric coated 81 milliGRAM(s) Oral daily  atorvastatin 40 milliGRAM(s) Oral at bedtime  enoxaparin Injectable 50 milliGRAM(s) SubCutaneous daily  hydrALAZINE 50 milliGRAM(s) Oral every 8 hours  metoprolol     tartrate 25 milliGRAM(s) Oral every 6 hours  mirtazapine 7.5 milliGRAM(s) Oral at bedtime  pantoprazole    Tablet 40 milliGRAM(s) Oral daily    MEDICATIONS  (PRN):      Allergies    No Known Allergies    Intolerances Resident Progress Note    Patient is a 82y old  Female who presents with a chief complaint of diarrhea  x 1 week, weakness (2018 13:38)      HPI: 82F PMHx Afib on Coumadin, HLD, CVA (), MVP, OA, HTN, RA, PVD (s/p RLE bypassx3, most recently ), Neuropathy, hx OM BIBEMS from home with cc of diarrhea found to be bradycardic and hypotensive. Unable to obtain any history from patient as she was intubated prior to my evaluation. As per daughters at bedside, patient was complaining of watery diarrhea for past couple weeks, which seemed to be resolving. Patient was doing well last they had spoke to her earlier this morning until about 3pm when aid felt patient seemed very weak and tired and called ambulance. Of note per daughters, patient had seen her cardiologist about 10 days prior, was fount to be hypertensive at which time at diuretic was added and two of her antihypertensive medication doses were increased.   In the ED, patient was found to be severely bradycardic with HR in 30's and hypotensive with blood pressure around 50-60 systolic and diastolic 30-40's. Received 3.5L NS, Zosyn x1, Calcium gluconate, Humalog, Zofran x1. EKG showed HR 40bpm with undetermined rhythm, septal infarct age undetermined. Patient was intubated and is s/p transcutaneous pacemaker placement by ICU in ED. Started on Dopamine and Levophed drip, admitted to ICU. (2018 21:17)    She is now extubated, acidosis, hypotension, and bradycardia all resolved, off of temporary pacer. She is off pressors and is in sinus rhythm.    Overnight: no acute events overnight. Patient seen and examined at bedside. Patient denies diarrhea, reports having an episode of soft, solid stool yesterday. She was seen eating breakfast, is feeling well, and wants to go home. She has no complaints at this time.     Tele: 1 episode of  sinus tachycardia at 135, but is now NS @ 80s.    ROS:  Constitutional: denies fever, chills, diaphoresis   HEENT: denies blurry vision, difficulty hearing  Respiratory: denies SOB, CRABTREE, cough, sputum production, wheezing, hemoptysis  Cardiovascular: denies chest pain, palpitations, edema  Gastrointestinal: denies nausea, vomiting, diarrhea, constipation, abdominal pain, melena, hematochezia   Genitourinary: denies dysuria, frequency, urgency, hematuria   Skin/Breast: denies rash, itching  Musculoskeletal: denies myalgias, joint swelling, muscle weakness  Neurologic: denies headache, weakness, dizziness, paresthesias, numbness/tingling  ROS negative except as noted above    Vital Signs Last 24 Hrs  T(C): 36.4 (18 @ 07:13), Max: 36.7 (18 @ 21:00)  T(F): 97.6 (18 @ 07:13), Max: 98.1 (18 @ 21:00)  HR: 107 (18 @ 12:41) (86 - 117)  BP: 130/82 (18 @ 12:41) (130/82 - 179/93)  BP(mean): 106 (18 @ 16:00) (103 - 113)  RR: 18 (18 @ 12:41) (16 - 32)  SpO2: 98% (18 @ 12:41) (96% - 98%)    I&O's Summary    2018 07:01  -  2018 07:00  --------------------------------------------------------  IN: 560 mL / OUT: 0 mL / NET: 560 mL      Physical Exam:  General: Well developed, well nourished, NAD  HEENT: NCAT, PERRLA, EOMI bl, moist mucous membranes   Neck: Supple, nontender, no mass  Neurology: A&Ox3, nonfocal, CN II-XII grossly intact, sensation intact, no gait abnormalities   Respiratory: CTA B/L, No W/R/R  CV: RRR, +S1/S2, no murmurs, rubs or gallops  Abdominal: Soft, NT, ND +BSx4  Extremities: chronic discoloration of bilateral LE due to PVD (R worse than L), unable to palpate DP and PT pulses for b/l LE, b/l UE wnl  MSK: Normal ROM, no joint erythema or warmth, no joint swelling   Skin: warm, dry, normal color    LABS:                        10.0   7.7   )-----------( 174      ( 2018 07:13 )             31.7     2018 07:13    145    |  111    |  22     ----------------------------<  91     4.1     |  28     |  1.20     Ca    8.6        2018 07:13  Phos  2.9       2018 07:13  Mg     2.0       2018 07:13    TPro  6.2    /  Alb  2.7    /  TBili  0.7    /  DBili  x      /  AST  28     /  ALT  58     /  AlkPhos  95     2018 07:13    PT/INR - ( 2018 07:13 )   PT: 13.0 sec;   INR: 1.19 ratio    PTT - ( 2018 07:13 )  PTT:35.1 sec      Urinalysis Basic - ( 2018 01:02 )    Color: Yellow / Appearance: Turbid / S.010 / pH: x  Gluc: x / Ketone: Negative  / Bili: Negative / Urobili: Negative   Blood: x / Protein: 500 mg/dL / Nitrite: Negative   Leuk Esterase: Moderate / RBC: 11-25 /HPF / WBC 11-25   Sq Epi: x / Non Sq Epi: Moderate / Bacteria: TNTC    Urine culture: + 10K-40K E coli ESBL    Rapid RVP Result: Amarilystec ( @ 21:03)    ABG - ( 2018 06:45 )  pH: 7.39  /  pCO2: 31    /  pO2: 141   / HCO3: 20    / Base Excess: -6.0  /  SaO2: 99        CAPILLARY BLOOD GLUCOSE          RADIOLOGY & ADDITIONAL TESTS:    MEDICATIONS  (STANDING):  aspirin enteric coated 81 milliGRAM(s) Oral daily  atorvastatin 40 milliGRAM(s) Oral at bedtime  enoxaparin Injectable 50 milliGRAM(s) SubCutaneous daily  hydrALAZINE 50 milliGRAM(s) Oral every 8 hours  metoprolol     tartrate 25 milliGRAM(s) Oral every 6 hours  mirtazapine 7.5 milliGRAM(s) Oral at bedtime  pantoprazole    Tablet 40 milliGRAM(s) Oral daily    MEDICATIONS  (PRN):      Allergies    No Known Allergies    Intolerances

## 2018-02-09 NOTE — PROGRESS NOTE ADULT - PROBLEM SELECTOR PLAN 5
Likely due to dehydration, Improved  - Cr stable this AM 1.2, yesterday 1.2  - Avoid nephrotoxic agents  - K 4.1, replete with potassium powder  - Continue to trend labs, f/u K

## 2018-02-09 NOTE — PROGRESS NOTE ADULT - ASSESSMENT
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 BIBEMS from home with diarrhea, severe bradycardia and hypotension, s/p TVP  for LOC and brief loss of pulse, admitted with likely hypovolumic shock but concern for AI/ med effect and septic shock.

## 2018-02-09 NOTE — PROGRESS NOTE ADULT - PROBLEM SELECTOR PLAN 8
VS: 170/90  - Per cardio, HD stable, continue metoprolol 25 q6h, hydralazine 50 q6h, ASA, statin, hold ARB given BRIDGET  - Will restart ARB when d/ambreen

## 2018-02-09 NOTE — PROGRESS NOTE ADULT - PROBLEM SELECTOR PLAN 2
- Possibly due to 6 weeks of IV Ancef from Nov 2017 for amputation of R second toe  - C diff sample rejected b/c patient was forming solid stool  - Resolved, had solid soft BM yesterday  - Per ID, no active support, remains at risk, high threshold for antibiotics.

## 2018-02-09 NOTE — PROGRESS NOTE ADULT - PROBLEM SELECTOR PLAN 4
2/5 Urine culture: + 10K-40K E coli ESBL  - Asymptomatic  - Was given ertapenem x1, d/ambreen ertapenem  - Per ID, patient improving without much in the way of effective antibiotic therapy, would not treat at this time, contact precautions per protocol.   - F/u ID

## 2018-02-09 NOTE — PROGRESS NOTE ADULT - PROBLEM SELECTOR PLAN 6
Tele: no overnight events. Ranges from sinus tachy to NSR  - INR subtherapeutic AM 1.19, increase coumadin to 3, c/w full dose Lovenox for bridge   - Daily INR

## 2018-02-09 NOTE — PROGRESS NOTE ADULT - PROBLEM SELECTOR PLAN 1
Hypotension, bradycardia likely 2/2 persistent diarrhea, addition of diuretic to antihypertensive regiment and poor PO intake leading to hypovolemic state and metabolic derangements   - 2/6 patient extubated, d/ambreen TVP  - Resolved, avoid pharmacologic measures if possible, no opioids/BZ's, out of bed; doing well with mirtazapine  - Off pressors   - Per cardio, HD stable, continue metoprolol 25 q6h, hydralazine 50 q6h, ASA, statin, hold ARB given BRIDGET  - Per ID, not on abx b/c suspect cardiac basis not infection, monitor symptoms  - Try to maintain MAP >65 to allow for most perfusion to lower ext  - Monitor and replete electrolytes. Keep K>4.0 and Mg>2.0.  - F/u cardio

## 2018-02-09 NOTE — PROGRESS NOTE ADULT - ASSESSMENT
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 BIBEMS from home with   diarrhea, severe bradycardia and hypotension, s/p TVP  for LOC and brief loss of pulse/ , admitted with likely shock    -remains in sr though did have brief af/rvr in the 24 h reviewed this am  -  cont metop 25mg tid    - may need to consider more av meg agents, which would carry some additional risk noting her recent issue with bradycardia requiring tvp  - if she requires additional avn blocking and cannot tolerate it, she may need to be referred for ppm  - Try to maintain MAP >65 to allow for most perfusion to lower ext  - cont hydralazine, noting that bp has been labile/high and that she may require more medication for bp  - DVT prophylaxis  - ASA  - Monitor and replete electrolytes. Keep K>4.0 and Mg>2.0.   - Further cardiac workup will depend on clinical course.   - will follow

## 2018-02-10 LAB
ANION GAP SERPL CALC-SCNC: 9 MMOL/L — SIGNIFICANT CHANGE UP (ref 5–17)
APTT BLD: 35.5 SEC — SIGNIFICANT CHANGE UP (ref 27.5–37.4)
BUN SERPL-MCNC: 27 MG/DL — HIGH (ref 7–23)
CALCIUM SERPL-MCNC: 8.2 MG/DL — LOW (ref 8.5–10.1)
CHLORIDE SERPL-SCNC: 109 MMOL/L — HIGH (ref 96–108)
CO2 SERPL-SCNC: 28 MMOL/L — SIGNIFICANT CHANGE UP (ref 22–31)
CREAT SERPL-MCNC: 1.2 MG/DL — SIGNIFICANT CHANGE UP (ref 0.5–1.3)
CULTURE RESULTS: SIGNIFICANT CHANGE UP
CULTURE RESULTS: SIGNIFICANT CHANGE UP
GLUCOSE SERPL-MCNC: 86 MG/DL — SIGNIFICANT CHANGE UP (ref 70–99)
HCT VFR BLD CALC: 30.5 % — LOW (ref 34.5–45)
HGB BLD-MCNC: 9.7 G/DL — LOW (ref 11.5–15.5)
INR BLD: 1.14 RATIO — SIGNIFICANT CHANGE UP (ref 0.88–1.16)
MCHC RBC-ENTMCNC: 28.7 PG — SIGNIFICANT CHANGE UP (ref 27–34)
MCHC RBC-ENTMCNC: 32 GM/DL — SIGNIFICANT CHANGE UP (ref 32–36)
MCV RBC AUTO: 89.7 FL — SIGNIFICANT CHANGE UP (ref 80–100)
PLATELET # BLD AUTO: 163 K/UL — SIGNIFICANT CHANGE UP (ref 150–400)
POTASSIUM SERPL-MCNC: 4 MMOL/L — SIGNIFICANT CHANGE UP (ref 3.5–5.3)
POTASSIUM SERPL-SCNC: 4 MMOL/L — SIGNIFICANT CHANGE UP (ref 3.5–5.3)
PROTHROM AB SERPL-ACNC: 12.5 SEC — SIGNIFICANT CHANGE UP (ref 9.8–12.7)
RBC # BLD: 3.4 M/UL — LOW (ref 3.8–5.2)
RBC # FLD: 15.6 % — HIGH (ref 10.3–14.5)
SODIUM SERPL-SCNC: 146 MMOL/L — HIGH (ref 135–145)
SPECIMEN SOURCE: SIGNIFICANT CHANGE UP
SPECIMEN SOURCE: SIGNIFICANT CHANGE UP
WBC # BLD: 7.9 K/UL — SIGNIFICANT CHANGE UP (ref 3.8–10.5)
WBC # FLD AUTO: 7.9 K/UL — SIGNIFICANT CHANGE UP (ref 3.8–10.5)

## 2018-02-10 PROCEDURE — 99233 SBSQ HOSP IP/OBS HIGH 50: CPT

## 2018-02-10 PROCEDURE — 99232 SBSQ HOSP IP/OBS MODERATE 35: CPT

## 2018-02-10 RX ORDER — WARFARIN SODIUM 2.5 MG/1
5 TABLET ORAL ONCE
Qty: 0 | Refills: 0 | Status: COMPLETED | OUTPATIENT
Start: 2018-02-10 | End: 2018-02-10

## 2018-02-10 RX ORDER — ENOXAPARIN SODIUM 100 MG/ML
50 INJECTION SUBCUTANEOUS DAILY
Qty: 0 | Refills: 0 | Status: DISCONTINUED | OUTPATIENT
Start: 2018-02-10 | End: 2018-02-11

## 2018-02-10 RX ORDER — LOSARTAN POTASSIUM 100 MG/1
50 TABLET, FILM COATED ORAL DAILY
Qty: 0 | Refills: 0 | Status: DISCONTINUED | OUTPATIENT
Start: 2018-02-10 | End: 2018-02-10

## 2018-02-10 RX ADMIN — ATORVASTATIN CALCIUM 40 MILLIGRAM(S): 80 TABLET, FILM COATED ORAL at 21:38

## 2018-02-10 RX ADMIN — Medication 50 MILLIGRAM(S): at 21:38

## 2018-02-10 RX ADMIN — ENOXAPARIN SODIUM 50 MILLIGRAM(S): 100 INJECTION SUBCUTANEOUS at 08:59

## 2018-02-10 RX ADMIN — Medication 25 MILLIGRAM(S): at 06:20

## 2018-02-10 RX ADMIN — Medication 81 MILLIGRAM(S): at 11:50

## 2018-02-10 RX ADMIN — WARFARIN SODIUM 5 MILLIGRAM(S): 2.5 TABLET ORAL at 21:38

## 2018-02-10 RX ADMIN — PANTOPRAZOLE SODIUM 40 MILLIGRAM(S): 20 TABLET, DELAYED RELEASE ORAL at 11:51

## 2018-02-10 RX ADMIN — Medication 50 MILLIGRAM(S): at 06:19

## 2018-02-10 RX ADMIN — Medication 25 MILLIGRAM(S): at 11:50

## 2018-02-10 RX ADMIN — MIRTAZAPINE 7.5 MILLIGRAM(S): 45 TABLET, ORALLY DISINTEGRATING ORAL at 21:38

## 2018-02-10 RX ADMIN — Medication 25 MILLIGRAM(S): at 17:58

## 2018-02-10 RX ADMIN — Medication 25 MILLIGRAM(S): at 00:05

## 2018-02-10 NOTE — PROGRESS NOTE ADULT - ASSESSMENT
82F PMH Afib on coumadin( suspect non compliance ), distant CVA (1990s), PVD s/p R fem-peroneal bypass, osteomyelitis with R 2nd toe amputation in Nov 2017, RA, MVP, OA presenting with diarrhea, BRIDGET and acidemia, found to be severely bradycardic and hypotensive progressing to cardiac arrest requiring TVP, resulting in acute respiratory failure.Extubated 2/6, resolved acidosis, bradycardia, and hypotension likely secondary to meds and dehydration

## 2018-02-10 NOTE — CHART NOTE - NSCHARTNOTEFT_GEN_A_CORE
Assessment:   Pt awake, alert, reports eating better, hungry for breakfast. States she does not like the Ensure we have here, she drinks it at home twice a day but its a different bottle. Agreeable to rice pudding.  Seen by SLP 2/7, dysphagia II, thin liquids recommended. SLP followup 2/9 notes regular diet thin liquids.  Last BM noted 2/8.    Factors impacting intake: [x] none [ ] nausea  [ ] vomiting [ ] diarrhea [ ] constipation  [ ]chewing problems [ ] swallowing issues  [ ] other:     Diet Presciption: Diet, Regular (02-08-18 @ 13:16)    Intake: improved per pt    Current Weight: 2/10 104#, 2/9 99.6#, 2/8 102.9#, 2/6 116.1#, 2/5 125.8#    Wts very inconsistent.     Pertinent Medications: MEDICATIONS  (STANDING):    atorvastatin 40 milliGRAM(s) Oral at bedtime  enoxaparin Injectable 50 milliGRAM(s) SubCutaneous daily  hydrALAZINE 50 milliGRAM(s) Oral every 8 hours  metoprolol     tartrate 25 milliGRAM(s) Oral every 6 hours  mirtazapine 7.5 milliGRAM(s) Oral at bedtime  pantoprazole    Tablet 40 milliGRAM(s) Oral daily      Pertinent Labs: 02-10 Na146 mmol/L<H> Glu 86 mg/dL K+ 4.0 mmol/L Cr  1.20 mg/dL BUN 27 mg/dL<H> Phos n/a   Alb n/a   PAB n/a       Skin: sacral I noted, malathi 14. Consider adding daily MVI.     Estimated Needs:   [x] no change since previous assessment  [ ] recalculated:     Previous Nutrition Diagnosis:   [ ] Inadequate Energy Intake [x]Inadequate Oral Intake [ ] Excessive Energy Intake   [ ] Underweight [ ] Increased Nutrient Needs [ ] Overweight/Obesity   [ ] Altered GI Function [ ] Unintended Weight Loss [ ] Food & Nutrition Related Knowledge Deficit [ ] Malnutrition     Nutrition Diagnosis is [x] ongoing  [ ] resolved [ ] not applicable     New Nutrition Diagnosis: [ ] not applicable       Interventions:   Recommend  [x] Change Diet To: dashl/tlc due to PMH, hold off on CC as BS better at this time. Does not seem pt follows DM diet at home as drinks regular Ensure.   [ ] Nutrition Supplement  [ ] Nutrition Support  [ ] Other:     Monitoring and Evaluation:   [x] PO intake [ x ] Tolerance to diet prescription [ x ] weights [ x ] labs[ x ] follow up per protocol  [ ] other:

## 2018-02-10 NOTE — PROGRESS NOTE ADULT - PROBLEM SELECTOR PLAN 8
- continue metoprolol 25 q6h, hydralazine 50 q6h, ASA, statin  - BP on high side, renal function better, will add losartan 50mg po daily, monitor renal function

## 2018-02-10 NOTE — PROGRESS NOTE ADULT - PROBLEM SELECTOR PLAN 6
Tele: no overnight events. Ranges from sinus tachy to NSR  - INR subtherapeutic AM 1.19, increase coumadin to 5, c/w full dose Lovenox for bridge   - Daily INR

## 2018-02-10 NOTE — PROGRESS NOTE ADULT - SUBJECTIVE AND OBJECTIVE BOX
[facility]    Dahiana Bañuelos, Evens, Toney, Terrie, Truman, Filiberto      [phone]    CHIEF COMPLAINT: Patient is a 82y old  Female who presents with a chief complaint of diarrhea  x 1 week, weakness (07 Feb 2018 13:38)      Follow Up: paf, rvr    Interim history: feels well, no cp or sob. rapid af noted. left arm is uncomfortable, has hematoma    MEDICATIONS  (STANDING):  aspirin enteric coated 81 milliGRAM(s) Oral daily  atorvastatin 40 milliGRAM(s) Oral at bedtime  diltiazem    Tablet 30 milliGRAM(s) Oral every 6 hours  enoxaparin Injectable 50 milliGRAM(s) SubCutaneous daily  hydrALAZINE 50 milliGRAM(s) Oral every 8 hours  losartan 50 milliGRAM(s) Oral daily  metoprolol     tartrate 25 milliGRAM(s) Oral every 6 hours  mirtazapine 7.5 milliGRAM(s) Oral at bedtime  pantoprazole    Tablet 40 milliGRAM(s) Oral daily  warfarin 5 milliGRAM(s) Oral once    MEDICATIONS  (PRN):      REVIEW OF SYSTEMS:  eye, ent, GI, , allergic, dermatologic, musculoskeletal and neurologic are negative except as described above    Vital Signs Last 24 Hrs  T(C): 36.9 (10 Feb 2018 12:00), Max: 36.9 (09 Feb 2018 21:54)  T(F): 98.4 (10 Feb 2018 12:00), Max: 98.4 (09 Feb 2018 21:54)  HR: 95 (10 Feb 2018 12:00) (70 - 107)  BP: 119/77 (10 Feb 2018 12:00) (119/77 - 167/87)  BP(mean): --  RR: 18 (10 Feb 2018 12:00) (16 - 18)  SpO2: 95% (10 Feb 2018 12:00) (95% - 98%)    I&O's Summary      Telemetry past 24h: sr, ~20 min of af/rvr ~12p 2/9    PHYSICAL EXAM:    Constitutional: well-nourished, well-developed, NAD   HEENT:  MMM, sclerae anicteric, conjunctivae clear, no oral cyanosis.  Pulmonary: Non-labored, breath sounds are clear bilaterally, No wheezing, rales or rhonchi  Cardiovascular: Regular, S1 and S2.  No murmur.  No rubs, gallops or clicks  Gastrointestinal: Bowel Sounds present, soft, nontender.   Lymph: No peripheral edema.   Neurological: Alert, no focal deficits  Skin: No rashes. left arm hematoma  Psych:  Mood & affect appropriate    LABS: All Labs Reviewed:                        9.7    7.9   )-----------( 163      ( 10 Feb 2018 07:10 )             30.5                         10.0   x     )-----------( x        ( 09 Feb 2018 19:58 )             31.9                         10.0   7.7   )-----------( 174      ( 09 Feb 2018 07:13 )             31.7     10 Feb 2018 07:10    146    |  109    |  27     ----------------------------<  86     4.0     |  28     |  1.20   09 Feb 2018 07:13    145    |  111    |  22     ----------------------------<  91     4.1     |  28     |  1.20   08 Feb 2018 06:24    146    |  110    |  20     ----------------------------<  96     3.5     |  27     |  1.20     Ca    8.2        10 Feb 2018 07:10  Ca    8.6        09 Feb 2018 07:13  Ca    8.5        08 Feb 2018 06:24  Phos  2.9       09 Feb 2018 07:13  Phos  2.8       08 Feb 2018 06:24  Mg     2.0       09 Feb 2018 07:13  Mg     2.2       08 Feb 2018 06:24    TPro  6.2    /  Alb  2.7    /  TBili  0.7    /  DBili  x      /  AST  28     /  ALT  58     /  AlkPhos  95     09 Feb 2018 07:13  TPro  6.1    /  Alb  2.8    /  TBili  0.8    /  DBili  x      /  AST  44     /  ALT  76     /  AlkPhos  100    08 Feb 2018 06:24    PT/INR - ( 10 Feb 2018 07:10 )   PT: 12.5 sec;   INR: 1.14 ratio         PTT - ( 10 Feb 2018 07:10 )  PTT:35.5 sec      Blood Culture:         RADIOLOGY:    EKG:    Echo:    < from: TTE Echo Doppler w/o Cont (02.05.18 @ 08:11) >     EXAM:  ECHO TTE W/O CON COMP W/DOPPLR         PROCEDURE DATE:  02/05/2018        INTERPRETATION:  INDICATION: respiratory failure  Referring M.D.:Lona  Blood Pressure 126/59        Weight (kg) :49     Height (cm):157       BSA (sq m): 1.47  Technician: JOJO    Dimensions:    LA 3.4       Normal Values: 2.0 - 4.0 cm    Ao 3.0        Normal Values: 2.0 - 3.8 cm  SEPTUM 0.8       Normal Values: 0.6 - 1.2 cm  PWT 0.8       Normal Values: 0.6 - 1.1 cm  LVIDd 3.4         Normal Values: 3.0 - 5.6 cm  LVIDs 2.3         Normal Values: 1.8 - 4.0 cm      OBSERVATIONS:  Technically difficult bedside ICU study. The patient is vented  Mitral Valve: Calcified mitral annulus and mitral valve leaflets with   normal opening. Mild mitral regurgitation.  Aortic Valve/Aorta: Calcified trileaflet aortic valve with normal opening.  Tricuspid Valve: Calcified trileaflet cusp at annulus with normally   opening valve leaflets. Mild tricuspid regurgitation.  Pulmonic Valve: The pulmonic valve is not well visualized. Probably   normal. Mild PI  Left Atrium: Small to moderate left atrial enlargement  Right Atrium: Normal  Left Ventricle: The endocardium is not well-visualized. There appears to   be grossly preserved left ventricular systolic function. There appears to   be concentric left ventricular hypertrophy. The EF is approximately 65%.  Right Ventricle: Normal right ventricular size and function. There is a   device wire noted in the right heart  Pericardium/Pleura: No pericardial effusion noted. Left pleural effusion   noted  Pulmonary/RV Pressure: The right ventricular systolic pressure is   estimated to be 41mmHg, assuming that the right atrial pressure is   estimated to be 8 mmHg. This is consistent with mild pulmonary   hypertension.  LV Diastolic Function: Stage II diastolic dysfunction    Conclusion: Likely difficult and limited study. Grossly preserved left   ventricular systolic function. Sensation diastolic dysfunction. Left   pleural effusion is noted.                  KIET NIETO M.D., ATTENDING CARDIOLOGIST  This document has been electronically signed. Feb 6 2018  7:49PM                < end of copied text >

## 2018-02-10 NOTE — DIETITIAN INITIAL EVALUATION ADULT. - FACTORS AFF FOOD INTAKE
Addendum  created 02/10/18 1250 by Yosef Ann MD    Sign clinical note       Pt wears full dentures and does not currently have them in place as she is NPO. States they fit well and denies difficulty chewing/swallowing

## 2018-02-10 NOTE — PROGRESS NOTE ADULT - SUBJECTIVE AND OBJECTIVE BOX
Patient is a 82y old  Female who presents with a chief complaint of diarrhea  x 1 week, weakness (07 Feb 2018 13:38)       INTERVAL HPI/OVERNIGHT EVENTS: No new symptoms, complaints. Denies chest pain, palpitation or any other new symptoms, complaints     MEDICATIONS  (STANDING):  aspirin enteric coated 81 milliGRAM(s) Oral daily  atorvastatin 40 milliGRAM(s) Oral at bedtime  enoxaparin Injectable 50 milliGRAM(s) SubCutaneous daily  hydrALAZINE 50 milliGRAM(s) Oral every 8 hours  metoprolol     tartrate 25 milliGRAM(s) Oral every 6 hours  mirtazapine 7.5 milliGRAM(s) Oral at bedtime  pantoprazole    Tablet 40 milliGRAM(s) Oral daily  warfarin 5 milliGRAM(s) Oral once    MEDICATIONS  (PRN):      Allergies    No Known Allergies    Intolerances        REVIEW OF SYSTEMS:  CONSTITUTIONAL: No fever,  or fatigue  EYES: No eye pain, visual disturbances, or discharge  ENMT:  No difficulty hearing, tinnitus, vertigo; No sinus or throat pain  NECK: No pain or stiffness  RESPIRATORY: No cough, wheezing, chills or hemoptysis; No shortness of breath  CARDIOVASCULAR: No chest pain, palpitations, dizziness, or leg swelling  GASTROINTESTINAL: No abdominal or epigastric pain. No nausea, vomiting, or hematemesis; No diarrhea or constipation. No melena or hematochezia.  GENITOURINARY: No dysuria, frequency, hematuria, or incontinence  NEUROLOGICAL: No headaches,  loss of strength, numbness, or tremors  SKIN: No itching, burning, rashes, or lesions   LYMPH NODES: No enlarged glands  ENDOCRINE: No heat or cold intolerance; No hair loss; No polydipsia or polyuria  MUSCULOSKELETAL: No joint pain or swelling; No muscle, back, or extremity pain  HEME/LYMPH: No easy bruising, or bleeding gums  ALLERGY AND IMMUNOLOGIC: No hives or eczema    Vital Signs Last 24 Hrs  T(C): 36.2 (10 Feb 2018 07:12), Max: 36.9 (09 Feb 2018 21:54)  T(F): 97.2 (10 Feb 2018 07:12), Max: 98.4 (09 Feb 2018 21:54)  HR: 80 (10 Feb 2018 07:12) (70 - 107)  BP: 159/94 (10 Feb 2018 07:12) (120/81 - 167/87)  BP(mean): --  RR: 18 (10 Feb 2018 07:12) (16 - 18)  SpO2: 97% (10 Feb 2018 07:12) (97% - 98%)    PHYSICAL EXAM:  GENERAL: NAD, Frail   HEAD:  Atraumatic, Normocephalic  EYES: EOMI, PERRLA, conjunctiva and sclera clear  ENMT: No tonsillar erythema, exudates, or enlargement; Moist mucous membranes   NECK: Supple, No JVD, Normal thyroid  NERVOUS SYSTEM:  Alert & Oriented X3, Good concentration; Non Focal   CHEST/LUNG: Clear to auscultation bilaterally; No rales, rhonchi, wheezing, or rubs  HEART: IRRegular rate and rhythm; No  rubs, or gallops  ABDOMEN: Soft, Nontender, Nondistended; Bowel sounds present  EXTREMITIES:  2+ Peripheral Pulses, No clubbing, cyanosis, or edema  LYMPH: No lymphadenopathy noted  SKIN: No rashes or lesions    LABS:                        9.7    7.9   )-----------( 163      ( 10 Feb 2018 07:10 )             30.5     10 Feb 2018 07:10    146    |  109    |  27     ----------------------------<  86     4.0     |  28     |  1.20     Ca    8.2        10 Feb 2018 07:10      PT/INR - ( 10 Feb 2018 07:10 )   PT: 12.5 sec;   INR: 1.14 ratio         PTT - ( 10 Feb 2018 07:10 )  PTT:35.5 sec  CAPILLARY BLOOD GLUCOSE        BLOOD CULTURE    RADIOLOGY & ADDITIONAL TESTS:    Imaging Personally Reviewed:  [ ] YES     Consultant(s) Notes Reviewed:      Care Discussed with Consultants/Other Providers:

## 2018-02-10 NOTE — PROGRESS NOTE ADULT - ASSESSMENT
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 BIBEMS from home with   diarrhea, severe bradycardia and hypotension, s/p TVP  for LOC and brief loss of pulse/ , admitted with likely shock    - predominantly in sr though did have sustained af/rvr in the 24 h reviewed this am  -  cont metop 25mg tid    - will add diltiazem 30 q6, which would carry some additional risk noting her recent issue with bradycardia requiring tvp  - if she requires additional avn blocking and cannot tolerate it, she may need to be referred for ppm  - losartan added, may need to hold if bp will not tolerate  - cont hydralazine for now, may need to hold if bp will not tolerate  - would give bb and ccb first if we run out of bp  - DVT prophylaxis  - ASA  - Monitor and replete electrolytes. Keep K>4.0 and Mg>2.0.   - Further cardiac workup will depend on clinical course.   - will follow

## 2018-02-11 LAB
ANION GAP SERPL CALC-SCNC: 9 MMOL/L — SIGNIFICANT CHANGE UP (ref 5–17)
APTT BLD: 34.1 SEC — SIGNIFICANT CHANGE UP (ref 27.5–37.4)
BUN SERPL-MCNC: 41 MG/DL — HIGH (ref 7–23)
CALCIUM SERPL-MCNC: 8.3 MG/DL — LOW (ref 8.5–10.1)
CHLORIDE SERPL-SCNC: 110 MMOL/L — HIGH (ref 96–108)
CO2 SERPL-SCNC: 27 MMOL/L — SIGNIFICANT CHANGE UP (ref 22–31)
CREAT SERPL-MCNC: 1.2 MG/DL — SIGNIFICANT CHANGE UP (ref 0.5–1.3)
GLUCOSE SERPL-MCNC: 84 MG/DL — SIGNIFICANT CHANGE UP (ref 70–99)
HCT VFR BLD CALC: 28.9 % — LOW (ref 34.5–45)
HGB BLD-MCNC: 9.3 G/DL — LOW (ref 11.5–15.5)
INR BLD: 1.07 RATIO — SIGNIFICANT CHANGE UP (ref 0.88–1.16)
MCHC RBC-ENTMCNC: 29 PG — SIGNIFICANT CHANGE UP (ref 27–34)
MCHC RBC-ENTMCNC: 32.2 GM/DL — SIGNIFICANT CHANGE UP (ref 32–36)
MCV RBC AUTO: 89.9 FL — SIGNIFICANT CHANGE UP (ref 80–100)
PLATELET # BLD AUTO: 170 K/UL — SIGNIFICANT CHANGE UP (ref 150–400)
POTASSIUM SERPL-MCNC: 4.3 MMOL/L — SIGNIFICANT CHANGE UP (ref 3.5–5.3)
POTASSIUM SERPL-SCNC: 4.3 MMOL/L — SIGNIFICANT CHANGE UP (ref 3.5–5.3)
PROTHROM AB SERPL-ACNC: 11.7 SEC — SIGNIFICANT CHANGE UP (ref 9.8–12.7)
RBC # BLD: 3.21 M/UL — LOW (ref 3.8–5.2)
RBC # FLD: 16.2 % — HIGH (ref 10.3–14.5)
SODIUM SERPL-SCNC: 146 MMOL/L — HIGH (ref 135–145)
WBC # BLD: 8.1 K/UL — SIGNIFICANT CHANGE UP (ref 3.8–10.5)
WBC # FLD AUTO: 8.1 K/UL — SIGNIFICANT CHANGE UP (ref 3.8–10.5)

## 2018-02-11 PROCEDURE — 99232 SBSQ HOSP IP/OBS MODERATE 35: CPT

## 2018-02-11 PROCEDURE — 99233 SBSQ HOSP IP/OBS HIGH 50: CPT

## 2018-02-11 RX ORDER — METOPROLOL TARTRATE 50 MG
1 TABLET ORAL
Qty: 0 | Refills: 0 | COMMUNITY
Start: 2018-02-11

## 2018-02-11 RX ORDER — CEFAZOLIN SODIUM 1 G
1000 VIAL (EA) INJECTION
Qty: 0 | Refills: 0 | COMMUNITY

## 2018-02-11 RX ORDER — ENOXAPARIN SODIUM 100 MG/ML
45 INJECTION SUBCUTANEOUS EVERY 12 HOURS
Qty: 0 | Refills: 0 | Status: DISCONTINUED | OUTPATIENT
Start: 2018-02-11 | End: 2018-02-12

## 2018-02-11 RX ORDER — DILTIAZEM HCL 120 MG
1 CAPSULE, EXT RELEASE 24 HR ORAL
Qty: 0 | Refills: 0 | COMMUNITY
Start: 2018-02-11

## 2018-02-11 RX ORDER — MIRTAZAPINE 45 MG/1
1 TABLET, ORALLY DISINTEGRATING ORAL
Qty: 0 | Refills: 0 | COMMUNITY
Start: 2018-02-11

## 2018-02-11 RX ORDER — WARFARIN SODIUM 2.5 MG/1
6 TABLET ORAL ONCE
Qty: 0 | Refills: 0 | Status: COMPLETED | OUTPATIENT
Start: 2018-02-11 | End: 2018-02-11

## 2018-02-11 RX ADMIN — ENOXAPARIN SODIUM 45 MILLIGRAM(S): 100 INJECTION SUBCUTANEOUS at 17:34

## 2018-02-11 RX ADMIN — ATORVASTATIN CALCIUM 40 MILLIGRAM(S): 80 TABLET, FILM COATED ORAL at 22:00

## 2018-02-11 RX ADMIN — Medication 25 MILLIGRAM(S): at 23:16

## 2018-02-11 RX ADMIN — Medication 25 MILLIGRAM(S): at 11:11

## 2018-02-11 RX ADMIN — Medication 50 MILLIGRAM(S): at 22:00

## 2018-02-11 RX ADMIN — Medication 25 MILLIGRAM(S): at 00:11

## 2018-02-11 RX ADMIN — PANTOPRAZOLE SODIUM 40 MILLIGRAM(S): 20 TABLET, DELAYED RELEASE ORAL at 11:11

## 2018-02-11 RX ADMIN — WARFARIN SODIUM 6 MILLIGRAM(S): 2.5 TABLET ORAL at 22:00

## 2018-02-11 RX ADMIN — Medication 50 MILLIGRAM(S): at 06:17

## 2018-02-11 RX ADMIN — Medication 25 MILLIGRAM(S): at 06:17

## 2018-02-11 RX ADMIN — Medication 81 MILLIGRAM(S): at 11:11

## 2018-02-11 RX ADMIN — Medication 25 MILLIGRAM(S): at 17:34

## 2018-02-11 RX ADMIN — Medication 50 MILLIGRAM(S): at 14:30

## 2018-02-11 RX ADMIN — MIRTAZAPINE 7.5 MILLIGRAM(S): 45 TABLET, ORALLY DISINTEGRATING ORAL at 22:00

## 2018-02-11 NOTE — PROGRESS NOTE ADULT - SUBJECTIVE AND OBJECTIVE BOX
Patient is a 82y old  Female who presents with a chief complaint of diarrhea  x 1 week, weakness (07 Feb 2018 13:38)       INTERVAL HPI/ OVERNIGHT EVENTS: no new events, complaints    MEDICATIONS  (STANDING):  aspirin enteric coated 81 milliGRAM(s) Oral daily  atorvastatin 40 milliGRAM(s) Oral at bedtime  diltiazem    Tablet 30 milliGRAM(s) Oral every 6 hours  enoxaparin Injectable 45 milliGRAM(s) SubCutaneous every 12 hours  hydrALAZINE 50 milliGRAM(s) Oral every 8 hours  metoprolol     tartrate 25 milliGRAM(s) Oral every 6 hours  mirtazapine 7.5 milliGRAM(s) Oral at bedtime  pantoprazole    Tablet 40 milliGRAM(s) Oral daily  warfarin 6 milliGRAM(s) Oral once    MEDICATIONS  (PRN):      Allergies    No Known Allergies    Intolerances        REVIEW OF SYSTEMS:  CONSTITUTIONAL: No fever or fatigue  EYES: No eye pain, visual disturbances, or discharge  ENMT:  No difficulty hearing, tinnitus, vertigo; No sinus or throat pain  NECK: No pain or stiffness  RESPIRATORY: No cough, wheezing, chills or hemoptysis; No shortness of breath  CARDIOVASCULAR: No chest pain, palpitations, dizziness, or leg swelling  GASTROINTESTINAL: No abdominal or epigastric pain. No nausea, vomiting, or hematemesis; No diarrhea or constipation. No melena or hematochezia.  GENITOURINARY: No dysuria, frequency, hematuria, or incontinence  NEUROLOGICAL: No headaches,  loss of strength, numbness, or tremors  SKIN: No itching, burning, rashes, or lesions   LYMPH NODES: No enlarged glands  ENDOCRINE: No heat or cold intolerance; No hair loss; No polydipsia or polyuria  MUSCULOSKELETAL: No joint pain or swelling; No muscle, back, or extremity pain  PSYCHIATRIC: No depression, anxiety, mood swings, or difficulty sleeping  HEME/LYMPH: No easy bruising, or bleeding gums  ALLERGY AND IMMUNOLOGIC: No hives or eczema    Vital Signs Last 24 Hrs  T(C): 36.9 (11 Feb 2018 07:31), Max: 37 (10 Feb 2018 20:12)  T(F): 98.5 (11 Feb 2018 07:31), Max: 98.6 (10 Feb 2018 20:12)  HR: 76 (11 Feb 2018 07:31) (70 - 95)  BP: 99/66 (11 Feb 2018 07:31) (99/66 - 158/86)  BP(mean): --  RR: 16 (11 Feb 2018 07:31) (16 - 18)  SpO2: 95% (11 Feb 2018 07:31) (95% - 96%)    PHYSICAL EXAM:  GENERAL: NAD, Frail  HEAD:  Atraumatic, Normocephalic  EYES: EOMI, PERRLA, conjunctiva and sclera clear  ENMT: No tonsillar erythema, exudates, or enlargement; Moist mucous membranes  NECK: Supple, No JVD, Normal thyroid  NERVOUS SYSTEM:  Alert & Oriented X3, Good concentration; Non Focal   CHEST/LUNG: Clear to auscultation bilaterally; No rales, rhonchi, wheezing, or rubs  HEART: IRRegular rate and rhythm; No murmurs, rubs, or gallops  ABDOMEN: Soft, Nontender, Nondistended; Bowel sounds present  EXTREMITIES:  2+ Peripheral Pulses, No clubbing, cyanosis, or edema  LYMPH: No lymphadenopathy noted  SKIN: No rashes or lesions    LABS:                        9.3    8.1   )-----------( 170      ( 11 Feb 2018 06:32 )             28.9     11 Feb 2018 06:32    146    |  110    |  41     ----------------------------<  84     4.3     |  27     |  1.20     Ca    8.3        11 Feb 2018 06:32      PT/INR - ( 11 Feb 2018 06:32 )   PT: 11.7 sec;   INR: 1.07 ratio         PTT - ( 11 Feb 2018 06:32 )  PTT:34.1 sec  CAPILLARY BLOOD GLUCOSE        BLOOD CULTURE    RADIOLOGY & ADDITIONAL TESTS:    Imaging Personally Reviewed:  [ ] YES     Consultant(s) Notes Reviewed:      Care Discussed with Consultants/Other Providers:

## 2018-02-11 NOTE — PROGRESS NOTE ADULT - PROBLEM SELECTOR PLAN 1
Resolved. Hypotension, bradycardia likely 2/2 persistent diarrhea, addition of diuretic to antihypertensive regiment and poor PO intake leading to hypovolemic state and metabolic derangements   - 2/6 patient extubated, d/ambreen TVP  - Resolved, avoid pharmacologic measures if possible, no opioids/BZ's, out of bed; doing well with mirtazapine  - Off pressors   - Per cardio, HD stable, continue metoprolol 25 q6h, hydralazine 50 q6h, ASA, statin, hold ARB given BRIDGET  - Per ID, not on abx b/c suspect cardiac basis not infection, monitor symptoms  - Try to maintain MAP >65 to allow for most perfusion to lower ext  - Monitor and replete electrolytes. Keep K>4.0 and Mg>2.0.  - F/u cardio

## 2018-02-11 NOTE — PROGRESS NOTE ADULT - SUBJECTIVE AND OBJECTIVE BOX
[facility]    Dahiana Bañuelos, Evens, Toney, Terrie, Truman, Filiberto      [phone]    CHIEF COMPLAINT: Patient is a 82y old  Female who presents with a chief complaint of diarrhea  x 1 week, weakness (07 Feb 2018 13:38)      Follow Up: paf, rvr, s/p asystole/bradycardia    Interim history: The patient reports no new symptoms.  Denies chest discomfort and shortness of breath.  No abdominal pain.  No new neurologic symptoms.      MEDICATIONS  (STANDING):  aspirin enteric coated 81 milliGRAM(s) Oral daily  atorvastatin 40 milliGRAM(s) Oral at bedtime  diltiazem    Tablet 30 milliGRAM(s) Oral every 6 hours  enoxaparin Injectable 45 milliGRAM(s) SubCutaneous every 12 hours  hydrALAZINE 50 milliGRAM(s) Oral every 8 hours  metoprolol     tartrate 25 milliGRAM(s) Oral every 6 hours  mirtazapine 7.5 milliGRAM(s) Oral at bedtime  pantoprazole    Tablet 40 milliGRAM(s) Oral daily  warfarin 6 milliGRAM(s) Oral once    MEDICATIONS  (PRN):      REVIEW OF SYSTEMS:  eye, ent, GI, , allergic, dermatologic, musculoskeletal and neurologic are negative except as described above    Vital Signs Last 24 Hrs  T(C): 37.1 (11 Feb 2018 11:09), Max: 37.1 (11 Feb 2018 11:09)  T(F): 98.7 (11 Feb 2018 11:09), Max: 98.7 (11 Feb 2018 11:09)  HR: 88 (11 Feb 2018 11:09) (70 - 90)  BP: 116/70 (11 Feb 2018 11:09) (99/66 - 158/86)  BP(mean): --  RR: 16 (11 Feb 2018 11:09) (16 - 18)  SpO2: 97% (11 Feb 2018 11:09) (95% - 97%)    I&O's Summary    10 Feb 2018 07:01  -  11 Feb 2018 07:00  --------------------------------------------------------  IN: 540 mL / OUT: 0 mL / NET: 540 mL    11 Feb 2018 07:01  -  11 Feb 2018 12:16  --------------------------------------------------------  IN: 240 mL / OUT: 0 mL / NET: 240 mL        Telemetry past 24h: sr, no addl af    PHYSICAL EXAM:    Constitutional: well-nourished, well-developed, NAD   HEENT:  MMM, sclerae anicteric, conjunctivae clear, no oral cyanosis.  Pulmonary: Non-labored, occ rhonchi and wheeze simona  Cardiovascular: Regular, S1 and S2.  No murmur.  No rubs, gallops or clicks  Gastrointestinal: Bowel Sounds present, soft, nontender.   Lymph: No peripheral edema.   Neurological: Alert, no focal deficits  Skin: No rashes.  Psych:  Mood & affect appropriate    LABS: All Labs Reviewed:                        9.3    8.1   )-----------( 170      ( 11 Feb 2018 06:32 )             28.9                         9.7    7.9   )-----------( 163      ( 10 Feb 2018 07:10 )             30.5                         10.0   x     )-----------( x        ( 09 Feb 2018 19:58 )             31.9     11 Feb 2018 06:32    146    |  110    |  41     ----------------------------<  84     4.3     |  27     |  1.20   10 Feb 2018 07:10    146    |  109    |  27     ----------------------------<  86     4.0     |  28     |  1.20   09 Feb 2018 07:13    145    |  111    |  22     ----------------------------<  91     4.1     |  28     |  1.20     Ca    8.3        11 Feb 2018 06:32  Ca    8.2        10 Feb 2018 07:10  Ca    8.6        09 Feb 2018 07:13  Phos  2.9       09 Feb 2018 07:13  Mg     2.0       09 Feb 2018 07:13    TPro  6.2    /  Alb  2.7    /  TBili  0.7    /  DBili  x      /  AST  28     /  ALT  58     /  AlkPhos  95     09 Feb 2018 07:13    PT/INR - ( 11 Feb 2018 06:32 )   PT: 11.7 sec;   INR: 1.07 ratio         PTT - ( 11 Feb 2018 06:32 )  PTT:34.1 sec      Blood Culture:         RADIOLOGY:    EKG:    Echo:    < from: TTE Echo Doppler w/o Cont (02.05.18 @ 08:11) >     EXAM:  ECHO TTE W/O CON COMP W/DOPPLR         PROCEDURE DATE:  02/05/2018        INTERPRETATION:  INDICATION: respiratory failure  Referring M.D.:Lona  Blood Pressure 126/59        Weight (kg) :49     Height (cm):157       BSA (sq m): 1.47  Technician: JOJO    Dimensions:    LA 3.4       Normal Values: 2.0 - 4.0 cm    Ao 3.0        Normal Values: 2.0 - 3.8 cm  SEPTUM 0.8       Normal Values: 0.6 - 1.2 cm  PWT 0.8       Normal Values: 0.6 - 1.1 cm  LVIDd 3.4         Normal Values: 3.0 - 5.6 cm  LVIDs 2.3         Normal Values: 1.8 - 4.0 cm      OBSERVATIONS:  Technically difficult bedside ICU study. The patient is vented  Mitral Valve: Calcified mitral annulus and mitral valve leaflets with   normal opening. Mild mitral regurgitation.  Aortic Valve/Aorta: Calcified trileaflet aortic valve with normal opening.  Tricuspid Valve: Calcified trileaflet cusp at annulus with normally   opening valve leaflets. Mild tricuspid regurgitation.  Pulmonic Valve: The pulmonic valve is not well visualized. Probably   normal. Mild PI  Left Atrium: Small to moderate left atrial enlargement  Right Atrium: Normal  Left Ventricle: The endocardium is not well-visualized. There appears to   be grossly preserved left ventricular systolic function. There appears to   be concentric left ventricular hypertrophy. The EF is approximately 65%.  Right Ventricle: Normal right ventricular size and function. There is a   device wire noted in the right heart  Pericardium/Pleura: No pericardial effusion noted. Left pleural effusion   noted  Pulmonary/RV Pressure: The right ventricular systolic pressure is   estimated to be 41mmHg, assuming that the right atrial pressure is   estimated to be 8 mmHg. This is consistent with mild pulmonary   hypertension.  LV Diastolic Function: Stage II diastolic dysfunction    Conclusion: Likely difficult and limited study. Grossly preserved left   ventricular systolic function. Sensation diastolic dysfunction. Left   pleural effusion is noted.                  KIET NIETO M.D., ATTENDING CARDIOLOGIST  This document has been electronically signed. Feb 6 2018  7:49PM                < end of copied text >

## 2018-02-11 NOTE — PROGRESS NOTE ADULT - PROBLEM SELECTOR PLAN 8
- continue metoprolol 25 q6h, hydralazine 50 q6h, ASA, statin  - BP on high side, renal function better, if needed will add Losartan  - Cardizem added yesterday, will monitor for now.

## 2018-02-11 NOTE — PROGRESS NOTE ADULT - ASSESSMENT
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 BIBEMS from home with   diarrhea, severe bradycardia and hypotension, s/p TVP  for LOC and brief loss of pulse/ , admitted with likely shock    -remains in sr  -despite the addition of diltiazem yesterday has not exhibited recurrent bradycardia/pauses  -  cont metop 25mg tid    - cont diltiazem 30 q6  - if she requires additional avn blocking and cannot tolerate it, she may need to be referred for ppm  -  cont losartan, bp overall improved  - cont hydralazine for now, may need to hold if bp will not tolerate  - would give bb and ccb first if we run out of bp  -lovenox and warfarin with goal inr 2-3.  inr subtherapeutic today  - ASA  - Monitor and replete electrolytes. Keep K>4.0 and Mg>2.0.   - Further cardiac workup will depend on clinical course.   - will follow

## 2018-02-11 NOTE — PROGRESS NOTE ADULT - ATTENDING COMMENTS
Coumadin 6mg po X1 today  INR in am. Coumadin 6mg po X1 today. Continue treatment dose Lovenox   INR in am.

## 2018-02-12 VITALS
OXYGEN SATURATION: 97 % | DIASTOLIC BLOOD PRESSURE: 61 MMHG | RESPIRATION RATE: 18 BRPM | TEMPERATURE: 98 F | HEART RATE: 97 BPM | SYSTOLIC BLOOD PRESSURE: 96 MMHG

## 2018-02-12 LAB
ANION GAP SERPL CALC-SCNC: 9 MMOL/L — SIGNIFICANT CHANGE UP (ref 5–17)
APTT BLD: 39.5 SEC — HIGH (ref 27.5–37.4)
BUN SERPL-MCNC: 41 MG/DL — HIGH (ref 7–23)
CALCIUM SERPL-MCNC: 8.4 MG/DL — LOW (ref 8.5–10.1)
CHLORIDE SERPL-SCNC: 108 MMOL/L — SIGNIFICANT CHANGE UP (ref 96–108)
CO2 SERPL-SCNC: 27 MMOL/L — SIGNIFICANT CHANGE UP (ref 22–31)
CORTICOSTEROID BINDING GLOBULIN RESULT: 2.9 MG/DL — SIGNIFICANT CHANGE UP
CORTIS F/TOTAL MFR SERPL: 92 % — SIGNIFICANT CHANGE UP
CORTIS SERPL-MCNC: 238 UG/DL — SIGNIFICANT CHANGE UP
CORTISOL, FREE RESULT: 218 UG/DL — SIGNIFICANT CHANGE UP
CREAT SERPL-MCNC: 1.3 MG/DL — SIGNIFICANT CHANGE UP (ref 0.5–1.3)
GLUCOSE SERPL-MCNC: 79 MG/DL — SIGNIFICANT CHANGE UP (ref 70–99)
HCT VFR BLD CALC: 29.7 % — LOW (ref 34.5–45)
HGB BLD-MCNC: 9.1 G/DL — LOW (ref 11.5–15.5)
INR BLD: 1.27 RATIO — HIGH (ref 0.88–1.16)
MCHC RBC-ENTMCNC: 27.8 PG — SIGNIFICANT CHANGE UP (ref 27–34)
MCHC RBC-ENTMCNC: 30.7 GM/DL — LOW (ref 32–36)
MCV RBC AUTO: 90.6 FL — SIGNIFICANT CHANGE UP (ref 80–100)
PLATELET # BLD AUTO: 182 K/UL — SIGNIFICANT CHANGE UP (ref 150–400)
POTASSIUM SERPL-MCNC: 4 MMOL/L — SIGNIFICANT CHANGE UP (ref 3.5–5.3)
POTASSIUM SERPL-SCNC: 4 MMOL/L — SIGNIFICANT CHANGE UP (ref 3.5–5.3)
PROTHROM AB SERPL-ACNC: 13.9 SEC — HIGH (ref 9.8–12.7)
RBC # BLD: 3.28 M/UL — LOW (ref 3.8–5.2)
RBC # FLD: 16.1 % — HIGH (ref 10.3–14.5)
SODIUM SERPL-SCNC: 144 MMOL/L — SIGNIFICANT CHANGE UP (ref 135–145)
WBC # BLD: 8.9 K/UL — SIGNIFICANT CHANGE UP (ref 3.8–10.5)
WBC # FLD AUTO: 8.9 K/UL — SIGNIFICANT CHANGE UP (ref 3.8–10.5)

## 2018-02-12 PROCEDURE — 87633 RESP VIRUS 12-25 TARGETS: CPT

## 2018-02-12 PROCEDURE — 82803 BLOOD GASES ANY COMBINATION: CPT

## 2018-02-12 PROCEDURE — 81001 URINALYSIS AUTO W/SCOPE: CPT

## 2018-02-12 PROCEDURE — 83605 ASSAY OF LACTIC ACID: CPT

## 2018-02-12 PROCEDURE — 85018 HEMOGLOBIN: CPT

## 2018-02-12 PROCEDURE — 87186 SC STD MICRODIL/AGAR DIL: CPT

## 2018-02-12 PROCEDURE — 87040 BLOOD CULTURE FOR BACTERIA: CPT

## 2018-02-12 PROCEDURE — 82553 CREATINE MB FRACTION: CPT

## 2018-02-12 PROCEDURE — 80053 COMPREHEN METABOLIC PANEL: CPT

## 2018-02-12 PROCEDURE — 96365 THER/PROPH/DIAG IV INF INIT: CPT

## 2018-02-12 PROCEDURE — 87086 URINE CULTURE/COLONY COUNT: CPT

## 2018-02-12 PROCEDURE — 84443 ASSAY THYROID STIM HORMONE: CPT

## 2018-02-12 PROCEDURE — 71045 X-RAY EXAM CHEST 1 VIEW: CPT

## 2018-02-12 PROCEDURE — 93306 TTE W/DOPPLER COMPLETE: CPT

## 2018-02-12 PROCEDURE — 87486 CHLMYD PNEUM DNA AMP PROBE: CPT

## 2018-02-12 PROCEDURE — 83735 ASSAY OF MAGNESIUM: CPT

## 2018-02-12 PROCEDURE — 93971 EXTREMITY STUDY: CPT

## 2018-02-12 PROCEDURE — 74018 RADEX ABDOMEN 1 VIEW: CPT

## 2018-02-12 PROCEDURE — 96367 TX/PROPH/DG ADDL SEQ IV INF: CPT

## 2018-02-12 PROCEDURE — 86850 RBC ANTIBODY SCREEN: CPT

## 2018-02-12 PROCEDURE — 84100 ASSAY OF PHOSPHORUS: CPT

## 2018-02-12 PROCEDURE — 82570 ASSAY OF URINE CREATININE: CPT

## 2018-02-12 PROCEDURE — 76775 US EXAM ABDO BACK WALL LIM: CPT

## 2018-02-12 PROCEDURE — 85610 PROTHROMBIN TIME: CPT

## 2018-02-12 PROCEDURE — 84480 ASSAY TRIIODOTHYRONINE (T3): CPT

## 2018-02-12 PROCEDURE — 84300 ASSAY OF URINE SODIUM: CPT

## 2018-02-12 PROCEDURE — 84484 ASSAY OF TROPONIN QUANT: CPT

## 2018-02-12 PROCEDURE — 86901 BLOOD TYPING SEROLOGIC RH(D): CPT

## 2018-02-12 PROCEDURE — 82272 OCCULT BLD FECES 1-3 TESTS: CPT

## 2018-02-12 PROCEDURE — 93005 ELECTROCARDIOGRAM TRACING: CPT

## 2018-02-12 PROCEDURE — 87581 M.PNEUMON DNA AMP PROBE: CPT

## 2018-02-12 PROCEDURE — 87798 DETECT AGENT NOS DNA AMP: CPT

## 2018-02-12 PROCEDURE — 86900 BLOOD TYPING SEROLOGIC ABO: CPT

## 2018-02-12 PROCEDURE — 84145 PROCALCITONIN (PCT): CPT

## 2018-02-12 PROCEDURE — 94003 VENT MGMT INPAT SUBQ DAY: CPT

## 2018-02-12 PROCEDURE — 96372 THER/PROPH/DIAG INJ SC/IM: CPT | Mod: 59

## 2018-02-12 PROCEDURE — 84436 ASSAY OF TOTAL THYROXINE: CPT

## 2018-02-12 PROCEDURE — 36600 WITHDRAWAL OF ARTERIAL BLOOD: CPT

## 2018-02-12 PROCEDURE — 82550 ASSAY OF CK (CPK): CPT

## 2018-02-12 PROCEDURE — 99239 HOSP IP/OBS DSCHRG MGMT >30: CPT

## 2018-02-12 PROCEDURE — 96376 TX/PRO/DX INJ SAME DRUG ADON: CPT

## 2018-02-12 PROCEDURE — 94799 UNLISTED PULMONARY SVC/PX: CPT

## 2018-02-12 PROCEDURE — 85730 THROMBOPLASTIN TIME PARTIAL: CPT

## 2018-02-12 PROCEDURE — 85027 COMPLETE CBC AUTOMATED: CPT

## 2018-02-12 PROCEDURE — 96375 TX/PRO/DX INJ NEW DRUG ADDON: CPT

## 2018-02-12 PROCEDURE — 94760 N-INVAS EAR/PLS OXIMETRY 1: CPT

## 2018-02-12 PROCEDURE — 97112 NEUROMUSCULAR REEDUCATION: CPT

## 2018-02-12 PROCEDURE — 99232 SBSQ HOSP IP/OBS MODERATE 35: CPT

## 2018-02-12 PROCEDURE — 97530 THERAPEUTIC ACTIVITIES: CPT

## 2018-02-12 PROCEDURE — 80048 BASIC METABOLIC PNL TOTAL CA: CPT

## 2018-02-12 PROCEDURE — 97162 PT EVAL MOD COMPLEX 30 MIN: CPT

## 2018-02-12 PROCEDURE — 99291 CRITICAL CARE FIRST HOUR: CPT | Mod: 25

## 2018-02-12 PROCEDURE — 97116 GAIT TRAINING THERAPY: CPT

## 2018-02-12 PROCEDURE — 94002 VENT MGMT INPAT INIT DAY: CPT

## 2018-02-12 RX ORDER — ENOXAPARIN SODIUM 100 MG/ML
40000 INJECTION SUBCUTANEOUS
Qty: 0 | Refills: 0 | COMMUNITY

## 2018-02-12 RX ORDER — WARFARIN SODIUM 2.5 MG/1
2 TABLET ORAL
Qty: 14 | Refills: 0 | OUTPATIENT
Start: 2018-02-12 | End: 2018-02-18

## 2018-02-12 RX ORDER — ENOXAPARIN SODIUM 100 MG/ML
45 INJECTION SUBCUTANEOUS
Qty: 0 | Refills: 0 | COMMUNITY

## 2018-02-12 RX ORDER — LOSARTAN POTASSIUM 100 MG/1
1 TABLET, FILM COATED ORAL
Qty: 0 | Refills: 0 | COMMUNITY

## 2018-02-12 RX ORDER — HYDRALAZINE HCL 50 MG
1 TABLET ORAL
Qty: 0 | Refills: 0 | COMMUNITY
Start: 2018-02-12

## 2018-02-12 RX ORDER — WARFARIN SODIUM 2.5 MG/1
1 TABLET ORAL
Qty: 0 | Refills: 0 | COMMUNITY
Start: 2018-02-12

## 2018-02-12 RX ORDER — WARFARIN SODIUM 2.5 MG/1
6 TABLET ORAL ONCE
Qty: 0 | Refills: 0 | Status: DISCONTINUED | OUTPATIENT
Start: 2018-02-12 | End: 2018-02-12

## 2018-02-12 RX ADMIN — Medication 25 MILLIGRAM(S): at 12:04

## 2018-02-12 RX ADMIN — PANTOPRAZOLE SODIUM 40 MILLIGRAM(S): 20 TABLET, DELAYED RELEASE ORAL at 12:04

## 2018-02-12 RX ADMIN — ENOXAPARIN SODIUM 45 MILLIGRAM(S): 100 INJECTION SUBCUTANEOUS at 17:21

## 2018-02-12 RX ADMIN — Medication 50 MILLIGRAM(S): at 06:20

## 2018-02-12 RX ADMIN — Medication 25 MILLIGRAM(S): at 06:20

## 2018-02-12 RX ADMIN — Medication 81 MILLIGRAM(S): at 12:03

## 2018-02-12 RX ADMIN — Medication 50 MILLIGRAM(S): at 13:17

## 2018-02-12 RX ADMIN — ENOXAPARIN SODIUM 45 MILLIGRAM(S): 100 INJECTION SUBCUTANEOUS at 06:20

## 2018-02-12 NOTE — PROGRESS NOTE ADULT - PROVIDER SPECIALTY LIST ADULT
Cardiology
Critical Care
Hospitalist
Infectious Disease
Cardiology
Cardiology

## 2018-02-12 NOTE — PROGRESS NOTE ADULT - SUBJECTIVE AND OBJECTIVE BOX
HPI:  82F PMHx Afib on Coumadin, HLD, CVA (), MVP, OA, HTN, RA, PVD (s/p RLE bypassx3, most recently ), Neuropathy, hx OM BIBEMS from home with cc of diarrhea found to be bradycardic and hypotensive. Unable to obtain any history from patient as she was intubated prior to my evaluation. As per daughters at bedside, patient was complaining of watery diarrhea for past couple weeks, which seemed to be resolving. Patient was doing well last they had spoke to her earlier this morning until about 3pm when aid felt patient seemed very weak and tired and called ambulance. Of note per daughters, patient had seen her cardiologist about 10 days prior, was fount to be hypertensive at which time at diuretic was added and two of her antihypertensive medication doses were increased.     In the ED, patient was found to be severely bradycardic with HR in 30's and hypotensive with blood pressure around 50-60 systolic and diastolic 30-40's. Received 3.5L NS, Zosyn x1, Calcium gluconate, Humalog, Zofran x1. EKG showed HR 40bpm with undetermined rhythm, septal infarct age undetermined. Patient was intubated and is s/p transcutaneous pacemaker placement by ICU in ED. Started on Dopamine and Levophed drip, admitted to ICU. (2018 21:17)      SUBJECTIVE:  Patient is a 82y old  Female who presents with a chief complaint of diarrhea  x 1 week, weakness (2018 13:38)          OBJECTIVE:  Review Of Systems:  Constitutional: [ ] Fever [ ] Chills [ ] Fatigue [ ] Weight change   HEENT: [ ] Blurred vision [ ] Eye Pain [ ] Headache [ ] Runny nose [ ] Sore Throat   Respiratory: [ ] Cough [ ] Wheezing [ ] Shortness of breath  Cardiovascular: [ ] Chest Pain [ ] Palpitations [ ] CRABTREE [ ] PND [ ] Orthopnea (x) murmur   Gastrointestinal: [ ] Abdominal Pain [ ] Diarrhea [ ] Constipation [ ] Hemorrhoids [ ] Nausea [ ] Vomiting  Genitourinary: [ ] Nocturia [ ] Dysuria [ ] Incontinence  Extremities: [ ] Swelling [ ] Joint Pain  Neurologic: [ ] Focal deficit [ ] Paresthesias [ ] Syncope  Lymphatic: [ ] Swelling [ ] Lymphadenopathy   Skin: [ ] Rash [ ] Ecchymoses [ ] Wounds [ ] Lesions  Psychiatry: [ ] Depression [ ] Suicidal/Homicidal Ideation [ ] Anxiety [ ] Sleep Disturbances  [ ] 10 point review of systems is otherwise negative except as mentioned above            [ ]Unable to obtain    Allergy:  Allergies    No Known Allergies    Intolerances        Medications:  MEDICATIONS  (STANDING):  aspirin enteric coated 81 milliGRAM(s) Oral daily  atorvastatin 40 milliGRAM(s) Oral at bedtime  diltiazem    Tablet 30 milliGRAM(s) Oral every 6 hours  enoxaparin Injectable 45 milliGRAM(s) SubCutaneous every 12 hours  hydrALAZINE 50 milliGRAM(s) Oral every 8 hours  metoprolol     tartrate 25 milliGRAM(s) Oral every 6 hours  mirtazapine 7.5 milliGRAM(s) Oral at bedtime  pantoprazole    Tablet 40 milliGRAM(s) Oral daily    MEDICATIONS  (PRN):      PMH/PSH/FH/SH: [ ] Unchanged    Vitals:  T(C): 36.6 (18 @ 07:52), Max: 37.1 (18 @ 11:09)  HR: 74 (18 @ 07:52) (71 - 88)  BP: 122/74 (18 @ 07:52) (108/70 - 134/75)  BP(mean): --  RR: 18 (18 @ 07:52) (16 - 20)  SpO2: 99% (18 @ 07:52) (96% - 99%)  Wt(kg): --  Daily     Daily Weight in k.5 (2018 04:19)  I&O's Summary    2018 07:01  -  2018 07:00  --------------------------------------------------------  IN: 720 mL / OUT: 150 mL / NET: 570 mL        Labs:                        9.1    8.9   )-----------( 182      ( 2018 07:38 )             29.7         144  |  108  |  41<H>  ----------------------------<  79  4.0   |  27  |  1.30    Ca    8.4<L>      2018 07:38      PT/INR - ( 2018 07:38 )   PT: 13.9 sec;   INR: 1.27 ratio         PTT - ( 2018 07:38 )  PTT:39.5 sec                  ECG:  < from: 12 Lead ECG (18 @ 18:06) >  Ventricular Rate 40 BPM    Atrial Rate 37 BPM    QRS Duration 96 ms    Q-T Interval 554 ms    QTC Calculation(Bezet) 451 ms    R Axis -25 degrees    T Axis -25 degrees    Diagnosis Line Undetermined rhythm , no p-waves seen and R-R interval largelyregular. Possible sinus arrest with slow junctional escape.   Septal infarct (cited on or before 2018)  Abnormal ECG    < end of copied text >      Echo:  < from: TTE Echo Doppler w/o Cont (18 @ 08:11) >  EXAM:  ECHO TTE W/O CON COMP W/DOPPLR         PROCEDURE DATE:  2018        INTERPRETATION:  INDICATION: respiratory failure  Referring M.D.:Lona  Blood Pressure 126/59        Weight (kg) :49     Height (cm):157       BSA (sq m): 1.47  Technician: JOJO    Dimensions:    LA 3.4       Normal Values: 2.0 - 4.0 cm    Ao 3.0        Normal Values: 2.0 - 3.8 cm  SEPTUM 0.8       Normal Values: 0.6 - 1.2 cm  PWT 0.8       Normal Values: 0.6 - 1.1 cm  LVIDd 3.4         Normal Values: 3.0 - 5.6 cm  LVIDs 2.3         Normal Values: 1.8 - 4.0 cm      OBSERVATIONS:  Technically difficult bedside ICU study. The patient is vented  Mitral Valve: Calcified mitral annulus and mitral valve leaflets with   normal opening. Mild mitral regurgitation.  Aortic Valve/Aorta: Calcified trileaflet aortic valve with normal opening.  Tricuspid Valve: Calcified trileaflet cusp at annulus with normally   opening valve leaflets. Mild tricuspid regurgitation.  Pulmonic Valve: The pulmonic valve is not well visualized. Probably   normal. Mild PI  Left Atrium: Small to moderate left atrial enlargement  Right Atrium: Normal  Left Ventricle: The endocardium is not well-visualized. There appears to   be grossly preserved left ventricular systolic function. There appears to   be concentric left ventricular hypertrophy. The EF is approximately 65%.  Right Ventricle: Normal right ventricular size and function. There is a   device wire noted in the right heart  Pericardium/Pleura: No pericardial effusion noted. Left pleural effusion   noted  Pulmonary/RV Pressure: The right ventricular systolic pressure is   estimated to be 41mmHg, assuming that the right atrial pressure is   estimated to be 8 mmHg. This is consistent with mild pulmonary   hypertension.  LV Diastolic Function: Stage II diastolic dysfunction    Conclusion: Likely difficult and limited study. Grossly preserved left   ventricular systolic function. Sensation diastolic dysfunction. Left   pleural effusion is noted.          < end of copied text >      Stress Testing:     Cath:    Imaging:  < from: Xray Chest 1 View-PORTABLE IMMEDIATE (18 @ 00:16) >  EXAM:  XR CHEST PORTABLE IMMED 1V                            PROCEDURE DATE:  2018          INTERPRETATION:  OG tube and repositioning of ET tube.    AP chest. Prior dated 2016.    Endotracheal tube has been retracted slightly and now with tip just above   the level of the rachel. Nasogastric tube in satisfactory position.   Otherwise no change.    Impression: As above      < end of copied text >    < from: Xray Abdomen 1 View PORTABLE -Routine (18 @ 11:48) >  EXAM:  XR ABDOMEN PORTABLE ROUTINE 1V                            PROCEDURE DATE:  2018          INTERPRETATION:  Clinical information: Diarrhea.    Technique: Portable supine AP view of the abdomen.    Comparison: Prior x-ray examination of the abdomen from  8/715.    Findings: There is a nonspecific bowel gas pattern. An NG tube is noted   with the tip in the stomach. A common bile duct stent is noted. Metallic   surgical clips are notable within the left hemipelvis.    Right upper quadrant calcified gallstones are noted. Surgical suture   material is notable within the right hemiabdomen.    Multilevel degenerative changes are noted within the imaged potions of   the spine. The patient is status post gamma nail and intramedullaryrod   fixation of the bilateral proximal femurs.    Impression: Nonspecific bowel gas pattern.      < end of copied text >    < from: US Duplex Venous Upper Ext Ltd, Left (18 @ 11:31) >    EXAM:  US DPLX UPR EXT VEINS LTD LT                            PROCEDURE DATE:  2018          INTERPRETATION:  CLINICAL INFORMATION: Left upper extremity swelling.    COMPARISON: None available.    TECHNIQUE: Duplex sonography of the LEFT UPPER extremity with color and   spectral Doppler, with and without compression.      FINDINGS:    The left internal jugular, subclavian, axillary, brachial, and basilic   veins are patent and compressible where applicable.   The evaluation of the subclavian vein is limited.  The cephalic vein is not adequately visualized.    Doppler examination shows normal spontaneous and phasic flow.    IMPRESSION:     No evidence of left upper extremity deep venous thrombosis.        < end of copied text >  < from: US Renal (18 @ 12:58) >  FINDINGS: The kidneys are of normal size.  The kidneys appear echogenic.   There is no evidence for hydronephrosis.  The right kidney measures 9.3   cm and the left kidney measures 8.2 cm. There is a nonobstructing stone   off the lower pole of the right kidney which measures 9 mm. There is a   questionable left-sided nephroureteral stent. There is an left-sided   extrarenal pelvis.    Small cysts are notable within the left kidney.    The patient is status post Bateman catheter placement with the Bateman   balloon inside a decompressed urinary bladder.    Multiple intraluminal gallstones are notable. There is nonspecific   gallbladder wall thickening and/or edema. There is a small amount of   pericholecystic fluid. A stent is notable within the common bile duct.    The imaged portions of the aorta and IVC are unremarkable.    A right-sided pleural effusion is notable. A small amount of ascites is   visualized.    IMPRESSION: Echogenic kidneys compatible with medical renal disease. No   hydronephrosis.    9 mm nonobstructing right-sided intrarenal stone off the lower pole of   the kidney.    Gallstones and nonspecific gallbladder wall thickening. If there is   clinical suspicion of acute cholecystitis, recommend furtherevaluation   with HIDA scan.      < end of copied text >      Interpretation of Telemetry: NSR in the 70's w/ occasional PVCs-  Bigemini and trigemini       Physical Exam:  Appearance: [ ] Normal  [ ] abnormal [ ] NAD   Eyes: [ ] PERRL [ ] EOMI  HENT: [ ] Normal [ ] Abnormal oral muscosa [ ]NC/AT  Cardiovascular: [ ] S1 [ ] S2 [ ] RRR [ ] m/r/g [ ]edema [ ] JVP  Procedural Access Site: [ ]  hematoma [ ] tender to palpation [ ] 2+ pulse [ ] bruit [ ] Ecchymosis  Respiratory: [ ] Clear to auscultation bilaterally  Gastrointestinal: [ ] Soft [ ] tenderness[ ] distension [ ] BS  Musculoskeletal: [ ] clubbing [ ] joint deformity   Neurologic: [ ] Non-focal  Lymphatic: [ ] lymphadenopathy  Psychiatry: [ ] AAOx3  [ ] confused [ ] disoriented [ ] Mood & affect appropriate  Skin: [ ]  rashes [ ] ecchymoses [ ] cyanosis HPI:  82F PMHx Afib on Coumadin, HLD, CVA ('s), MVP, OA, HTN, RA, PVD (s/p RLE bypassx3, most recently ), Neuropathy, hx OM BIBEMS from home with cc of diarrhea found to be bradycardic and hypotensive. Unable to obtain any history from patient as she was intubated prior to my evaluation. As per daughters at bedside, patient was complaining of watery diarrhea for past couple weeks, which seemed to be resolving. Patient was doing well last they had spoke to her earlier this morning until about 3pm when aid felt patient seemed very weak and tired and called ambulance. Of note per daughters, patient had seen her cardiologist about 10 days prior, was fount to be hypertensive at which time at diuretic was added and two of her antihypertensive medication doses were increased.     In the ED, patient was found to be severely bradycardic with HR in 30's and hypotensive with blood pressure around 50-60 systolic and diastolic 30-40's. Received 3.5L NS, Zosyn x1, Calcium gluconate, Humalog, Zofran x1. EKG showed HR 40bpm with undetermined rhythm, septal infarct age undetermined. Patient was intubated and is s/p transcutaneous pacemaker placement by ICU in ED. Started on Dopamine and Levophed drip, admitted to ICU. (2018 21:17)      SUBJECTIVE:  Patient is a 82y old  Female who presents with a chief complaint of diarrhea  x 1 week, weakness (2018 13:38)    On exam today pt is sitting up in chair watching tv, she is w/o any complaints: denies chest pain, dyspnea, dizziness, palpitations, N&V, HA.           OBJECTIVE:  Review Of Systems:  Constitutional: [ ] Fever [ ] Chills [ ] Fatigue [ ] Weight change   HEENT: [ ] Blurred vision [ ] Eye Pain [ ] Headache [ ] Runny nose [ ] Sore Throat   Respiratory: [ ] Cough [ ] Wheezing [ ] Shortness of breath  Cardiovascular: [ ] Chest Pain [ ] Palpitations [ ] CRABTREE [ ] PND [ ] Orthopnea (x) murmur   Gastrointestinal: [ ] Abdominal Pain [ ] Diarrhea [ ] Constipation [ ] Hemorrhoids [ ] Nausea [ ] Vomiting  Genitourinary: [ ] Nocturia [ ] Dysuria [ ] Incontinence  Extremities: [ ] Swelling [ ] Joint Pain  Neurologic: [ ] Focal deficit [ ] Paresthesias [ ] Syncope  Lymphatic: [ ] Swelling [ ] Lymphadenopathy   Skin: [ ] Rash [ ] Ecchymoses [ ] Wounds [ ] Lesions  Psychiatry: [ ] Depression [ ] Suicidal/Homicidal Ideation [ ] Anxiety [ ] Sleep Disturbances  [x ] 10 point review of systems is otherwise negative except as mentioned above            [ ]Unable to obtain    Allergy:  Allergies    No Known Allergies    Intolerances        Medications:  MEDICATIONS  (STANDING):  aspirin enteric coated 81 milliGRAM(s) Oral daily  atorvastatin 40 milliGRAM(s) Oral at bedtime  diltiazem    Tablet 30 milliGRAM(s) Oral every 6 hours  enoxaparin Injectable 45 milliGRAM(s) SubCutaneous every 12 hours  hydrALAZINE 50 milliGRAM(s) Oral every 8 hours  metoprolol     tartrate 25 milliGRAM(s) Oral every 6 hours  mirtazapine 7.5 milliGRAM(s) Oral at bedtime  pantoprazole    Tablet 40 milliGRAM(s) Oral daily    MEDICATIONS  (PRN):      PMH/PSH/FH/SH: [ ] Unchanged    Vitals:  T(C): 36.6 (18 @ 07:52), Max: 37.1 (18 @ 11:09)  HR: 74 (18 @ 07:52) (71 - 88)  BP: 122/74 (18 @ 07:52) (108/70 - 134/75)  BP(mean): --  RR: 18 (18 @ 07:52) (16 - 20)  SpO2: 99% (18 @ 07:52) (96% - 99%)  Wt(kg): --  Daily     Daily Weight in k.5 (2018 04:19)  I&O's Summary    2018 07:01  -  2018 07:00  --------------------------------------------------------  IN: 720 mL / OUT: 150 mL / NET: 570 mL        Labs:                        9.1    8.9   )-----------( 182      ( 2018 07:38 )             29.7     02-    144  |  108  |  41<H>  ----------------------------<  79  4.0   |  27  |  1.30    Ca    8.4<L>      2018 07:38      PT/INR - ( 2018 07:38 )   PT: 13.9 sec;   INR: 1.27 ratio         PTT - ( 2018 07:38 )  PTT:39.5 sec                  ECG:  < from: 12 Lead ECG (18 @ 18:06) >  Ventricular Rate 40 BPM    Atrial Rate 37 BPM    QRS Duration 96 ms    Q-T Interval 554 ms    QTC Calculation(Bezet) 451 ms    R Axis -25 degrees    T Axis -25 degrees    Diagnosis Line Undetermined rhythm , no p-waves seen and R-R interval largelyregular. Possible sinus arrest with slow junctional escape.   Septal infarct (cited on or before 2018)  Abnormal ECG    < end of copied text >      Echo:  < from: TTE Echo Doppler w/o Cont (18 @ 08:11) >  EXAM:  ECHO TTE W/O CON COMP W/DOPPLR         PROCEDURE DATE:  2018        INTERPRETATION:  INDICATION: respiratory failure  Referring M.D.:Lona  Blood Pressure 126/59        Weight (kg) :49     Height (cm):157       BSA (sq m): 1.47  Technician: JOJO    Dimensions:    LA 3.4       Normal Values: 2.0 - 4.0 cm    Ao 3.0        Normal Values: 2.0 - 3.8 cm  SEPTUM 0.8       Normal Values: 0.6 - 1.2 cm  PWT 0.8       Normal Values: 0.6 - 1.1 cm  LVIDd 3.4         Normal Values: 3.0 - 5.6 cm  LVIDs 2.3         Normal Values: 1.8 - 4.0 cm      OBSERVATIONS:  Technically difficult bedside ICU study. The patient is vented  Mitral Valve: Calcified mitral annulus and mitral valve leaflets with   normal opening. Mild mitral regurgitation.  Aortic Valve/Aorta: Calcified trileaflet aortic valve with normal opening.  Tricuspid Valve: Calcified trileaflet cusp at annulus with normally   opening valve leaflets. Mild tricuspid regurgitation.  Pulmonic Valve: The pulmonic valve is not well visualized. Probably   normal. Mild PI  Left Atrium: Small to moderate left atrial enlargement  Right Atrium: Normal  Left Ventricle: The endocardium is not well-visualized. There appears to   be grossly preserved left ventricular systolic function. There appears to   be concentric left ventricular hypertrophy. The EF is approximately 65%.  Right Ventricle: Normal right ventricular size and function. There is a   device wire noted in the right heart  Pericardium/Pleura: No pericardial effusion noted. Left pleural effusion   noted  Pulmonary/RV Pressure: The right ventricular systolic pressure is   estimated to be 41mmHg, assuming that the right atrial pressure is   estimated to be 8 mmHg. This is consistent with mild pulmonary   hypertension.  LV Diastolic Function: Stage II diastolic dysfunction    Conclusion: Likely difficult and limited study. Grossly preserved left   ventricular systolic function. Sensation diastolic dysfunction. Left   pleural effusion is noted.          < end of copied text >      Stress Testing:     Cath:    Imaging:  < from: Xray Chest 1 View-PORTABLE IMMEDIATE (18 @ 00:16) >  EXAM:  XR CHEST PORTABLE IMMED 1V                            PROCEDURE DATE:  2018          INTERPRETATION:  OG tube and repositioning of ET tube.    AP chest. Prior dated 2016.    Endotracheal tube has been retracted slightly and now with tip just above   the level of the rachel. Nasogastric tube in satisfactory position.   Otherwise no change.    Impression: As above      < end of copied text >    < from: Xray Abdomen 1 View PORTABLE -Routine (18 @ 11:48) >  EXAM:  XR ABDOMEN PORTABLE ROUTINE 1V                            PROCEDURE DATE:  2018          INTERPRETATION:  Clinical information: Diarrhea.    Technique: Portable supine AP view of the abdomen.    Comparison: Prior x-ray examination of the abdomen from  8/715.    Findings: There is a nonspecific bowel gas pattern. An NG tube is noted   with the tip in the stomach. A common bile duct stent is noted. Metallic   surgical clips are notable within the left hemipelvis.    Right upper quadrant calcified gallstones are noted. Surgical suture   material is notable within the right hemiabdomen.    Multilevel degenerative changes are noted within the imaged potions of   the spine. The patient is status post gamma nail and intramedullaryrod   fixation of the bilateral proximal femurs.    Impression: Nonspecific bowel gas pattern.      < end of copied text >    < from: US Duplex Venous Upper Ext Ltd, Left (18 @ 11:31) >    EXAM:  US DPLX UPR EXT VEINS LTD LT                            PROCEDURE DATE:  2018          INTERPRETATION:  CLINICAL INFORMATION: Left upper extremity swelling.    COMPARISON: None available.    TECHNIQUE: Duplex sonography of the LEFT UPPER extremity with color and   spectral Doppler, with and without compression.      FINDINGS:    The left internal jugular, subclavian, axillary, brachial, and basilic   veins are patent and compressible where applicable.   The evaluation of the subclavian vein is limited.  The cephalic vein is not adequately visualized.    Doppler examination shows normal spontaneous and phasic flow.    IMPRESSION:     No evidence of left upper extremity deep venous thrombosis.        < end of copied text >  < from: US Renal (18 @ 12:58) >  FINDINGS: The kidneys are of normal size.  The kidneys appear echogenic.   There is no evidence for hydronephrosis.  The right kidney measures 9.3   cm and the left kidney measures 8.2 cm. There is a nonobstructing stone   off the lower pole of the right kidney which measures 9 mm. There is a   questionable left-sided nephroureteral stent. There is an left-sided   extrarenal pelvis.    Small cysts are notable within the left kidney.    The patient is status post Bateman catheter placement with the Bateman   balloon inside a decompressed urinary bladder.    Multiple intraluminal gallstones are notable. There is nonspecific   gallbladder wall thickening and/or edema. There is a small amount of   pericholecystic fluid. A stent is notable within the common bile duct.    The imaged portions of the aorta and IVC are unremarkable.    A right-sided pleural effusion is notable. A small amount of ascites is   visualized.    IMPRESSION: Echogenic kidneys compatible with medical renal disease. No   hydronephrosis.    9 mm nonobstructing right-sided intrarenal stone off the lower pole of   the kidney.    Gallstones and nonspecific gallbladder wall thickening. If there is   clinical suspicion of acute cholecystitis, recommend furtherevaluation   with HIDA scan.      < end of copied text >      Interpretation of Telemetry: NSR in the 70's w/ occasional PVCs-  Bigemini and trigemini       Physical Exam:  Appearance: [ x] Normal  [ ] abnormal [ ] NAD   Eyes: [ ] PERRL [ ] EOMI  HENT: [ x] Normal [ ] Abnormal oral muscosa [ ]NC/AT  Cardiovascular: [ x] S1 [ x] S2 [ x] RRR [ ] m/r/g [ ]edema [ ] JVP  Procedural Access Site: [ ]  hematoma [ ] tender to palpation [ ] 2+ pulse [ ] bruit [ ] Ecchymosis  Respiratory: [ x] Clear to auscultation bilaterally  Gastrointestinal: [ x] Soft [ ] tenderness[ ] distension [ ] BS  Musculoskeletal: [ ] clubbing [ ] joint deformity   Neurologic: [ ] Non-focal  Lymphatic: [ ] lymphadenopathy  Psychiatry: [ x] AAOx3  [ ] confused [ ] disoriented [ ] Mood & affect appropriate  Skin: [ ]  rashes [ ] ecchymoses [ ] cyanosis

## 2018-02-12 NOTE — PROGRESS NOTE ADULT - ASSESSMENT
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  inr 1.27 today  - Monitor and replete electrolytes. Keep K>4.0 and Mg>2.0.   - Further cardiac workup will depend on clinical course.   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  inr 1.27 today  - Monitor and replete electrolytes. Keep K>4.0 and Mg>2.0.  - no overt s&s of fluid overload on exam    - Further cardiac workup will depend on clinical course.   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  inr 1.27 today.  Coumadin 6 given last night, and has been increasing in dose since she was restarted on it.  I would dose 6mg again tonight, and go up tomorrow if the iNR does not increase.  - Monitor and replete electrolytes. Keep K>4.0 and Mg>2.0.  - no overt s&s of fluid overload on exam    - Further cardiac workup will depend on clinical course.   - will follow      Inga Mehta NP  ( Cardiology)

## 2018-03-03 LAB
INR PPP: 1.41 RATIO
QUALITY CONTROL: YES

## 2018-03-06 ENCOUNTER — NON-APPOINTMENT (OUTPATIENT)
Age: 83
End: 2018-03-06

## 2018-03-06 ENCOUNTER — APPOINTMENT (OUTPATIENT)
Dept: CARDIOLOGY | Facility: CLINIC | Age: 83
End: 2018-03-06
Payer: MEDICARE

## 2018-03-06 VITALS
HEART RATE: 65 BPM | DIASTOLIC BLOOD PRESSURE: 69 MMHG | BODY MASS INDEX: 20.39 KG/M2 | WEIGHT: 108 LBS | OXYGEN SATURATION: 92 % | SYSTOLIC BLOOD PRESSURE: 116 MMHG | HEIGHT: 61 IN

## 2018-03-06 PROCEDURE — 99215 OFFICE O/P EST HI 40 MIN: CPT

## 2018-03-06 PROCEDURE — 93000 ELECTROCARDIOGRAM COMPLETE: CPT

## 2018-03-06 RX ORDER — DILTIAZEM HYDROCHLORIDE 360 MG/1
360 CAPSULE, COATED, EXTENDED RELEASE ORAL
Qty: 90 | Refills: 3 | Status: DISCONTINUED | COMMUNITY
Start: 2017-12-27 | End: 2018-03-06

## 2018-03-06 RX ORDER — CHLORTHALIDONE 25 MG/1
25 TABLET ORAL DAILY
Qty: 30 | Refills: 5 | Status: DISCONTINUED | COMMUNITY
Start: 2018-01-29 | End: 2018-03-06

## 2018-03-09 ENCOUNTER — CHART COPY (OUTPATIENT)
Age: 83
End: 2018-03-09

## 2018-03-09 ENCOUNTER — MEDICATION RENEWAL (OUTPATIENT)
Age: 83
End: 2018-03-09

## 2018-03-09 LAB
INR PPP: 1.28 RATIO
QUALITY CONTROL: YES

## 2018-03-16 LAB
INR PPP: 1.35 RATIO
QUALITY CONTROL: YES

## 2018-03-20 ENCOUNTER — OUTPATIENT (OUTPATIENT)
Dept: OUTPATIENT SERVICES | Facility: HOSPITAL | Age: 83
LOS: 1 days | Discharge: HOME | End: 2018-03-20
Payer: MEDICARE

## 2018-03-20 DIAGNOSIS — Z41.9 ENCOUNTER FOR PROCEDURE FOR PURPOSES OTHER THAN REMEDYING HEALTH STATE, UNSPECIFIED: Chronic | ICD-10-CM

## 2018-03-20 DIAGNOSIS — Z96.7 PRESENCE OF OTHER BONE AND TENDON IMPLANTS: Chronic | ICD-10-CM

## 2018-03-20 DIAGNOSIS — Z98.49 CATARACT EXTRACTION STATUS, UNSPECIFIED EYE: Chronic | ICD-10-CM

## 2018-03-20 DIAGNOSIS — Z98.89 OTHER SPECIFIED POSTPROCEDURAL STATES: Chronic | ICD-10-CM

## 2018-03-20 DIAGNOSIS — M86.671 OTHER CHRONIC OSTEOMYELITIS, RIGHT ANKLE AND FOOT: ICD-10-CM

## 2018-03-20 DIAGNOSIS — N20.0 CALCULUS OF KIDNEY: Chronic | ICD-10-CM

## 2018-03-20 PROCEDURE — 99213 OFFICE O/P EST LOW 20 MIN: CPT

## 2018-03-20 PROCEDURE — G0463: CPT

## 2018-03-20 NOTE — ASU PREOP CHECKLIST - PATIENT'S PERSONAL PROPERTY REMOVED
3/22/2018    [address]     I have attempted to contact you, Mario Durand, unfortunatly I have not been suzy to reach you to validate any needs regarding community resources or education regarding your health. I would like to continue to provide you support. However, I have been unable to reach you at, [phone] (home) . Please let me know if I can help schedule an office visit for you or answer any questions. Nabil Olivier MD is interested in your health and hopes to hear from you soon. If you would like continued access to your Nurse Care Coordinator, Lázaro Arango RN, you can reach me at [phone]. I will wait to hear from you and will no longer reach out to you. Nabil Olivier MD and his/her team will continue to provide care and be available for questions.       In good health,     Cassidy BALBUENAN, RN Care Coordinator  [address] [facility],   [address] [place] [address]   [phone]
dentures

## 2018-03-23 DIAGNOSIS — I48.91 UNSPECIFIED ATRIAL FIBRILLATION: ICD-10-CM

## 2018-03-23 DIAGNOSIS — Z89.421 ACQUIRED ABSENCE OF OTHER RIGHT TOE(S): ICD-10-CM

## 2018-03-23 DIAGNOSIS — Z87.891 PERSONAL HISTORY OF NICOTINE DEPENDENCE: ICD-10-CM

## 2018-03-23 DIAGNOSIS — Z79.899 OTHER LONG TERM (CURRENT) DRUG THERAPY: ICD-10-CM

## 2018-03-23 DIAGNOSIS — Z82.49 FAMILY HISTORY OF ISCHEMIC HEART DISEASE AND OTHER DISEASES OF THE CIRCULATORY SYSTEM: ICD-10-CM

## 2018-03-23 DIAGNOSIS — E78.5 HYPERLIPIDEMIA, UNSPECIFIED: ICD-10-CM

## 2018-03-23 DIAGNOSIS — D64.9 ANEMIA, UNSPECIFIED: ICD-10-CM

## 2018-03-23 DIAGNOSIS — L97.512 NON-PRESSURE CHRONIC ULCER OF OTHER PART OF RIGHT FOOT WITH FAT LAYER EXPOSED: ICD-10-CM

## 2018-03-23 DIAGNOSIS — Z79.01 LONG TERM (CURRENT) USE OF ANTICOAGULANTS: ICD-10-CM

## 2018-03-23 DIAGNOSIS — I70.235 ATHEROSCLEROSIS OF NATIVE ARTERIES OF RIGHT LEG WITH ULCERATION OF OTHER PART OF FOOT: ICD-10-CM

## 2018-03-23 DIAGNOSIS — M86.671 OTHER CHRONIC OSTEOMYELITIS, RIGHT ANKLE AND FOOT: ICD-10-CM

## 2018-03-23 DIAGNOSIS — K21.9 GASTRO-ESOPHAGEAL REFLUX DISEASE WITHOUT ESOPHAGITIS: ICD-10-CM

## 2018-03-23 DIAGNOSIS — I34.1 NONRHEUMATIC MITRAL (VALVE) PROLAPSE: ICD-10-CM

## 2018-03-29 LAB — INR PPP: 2.58

## 2018-04-17 NOTE — H&P ADULT - PROBLEM SELECTOR PLAN 3
Gait abnormality Gait abnormality Gait abnormality controlled with metoprolol controlled with metoprolol  continue coumadin, bridge with lovenox

## 2018-04-19 ENCOUNTER — APPOINTMENT (OUTPATIENT)
Dept: CARDIOLOGY | Facility: CLINIC | Age: 83
End: 2018-04-19
Payer: MEDICARE

## 2018-04-19 ENCOUNTER — NON-APPOINTMENT (OUTPATIENT)
Age: 83
End: 2018-04-19

## 2018-04-19 VITALS — DIASTOLIC BLOOD PRESSURE: 88 MMHG | SYSTOLIC BLOOD PRESSURE: 164 MMHG

## 2018-04-19 VITALS
HEART RATE: 86 BPM | BODY MASS INDEX: 22.09 KG/M2 | WEIGHT: 117 LBS | DIASTOLIC BLOOD PRESSURE: 93 MMHG | SYSTOLIC BLOOD PRESSURE: 182 MMHG | OXYGEN SATURATION: 94 % | HEIGHT: 61 IN

## 2018-04-19 PROCEDURE — 99215 OFFICE O/P EST HI 40 MIN: CPT

## 2018-04-19 PROCEDURE — 93000 ELECTROCARDIOGRAM COMPLETE: CPT

## 2018-04-19 RX ORDER — MIRTAZAPINE 7.5 MG/1
7.5 TABLET, FILM COATED ORAL
Qty: 30 | Refills: 0 | Status: COMPLETED | COMMUNITY
Start: 2018-03-22

## 2018-04-19 RX ORDER — WARFARIN 5 MG/1
5 TABLET ORAL
Qty: 90 | Refills: 0 | Status: COMPLETED | COMMUNITY
Start: 2018-03-09

## 2018-04-24 LAB — INR PPP: 1.2

## 2018-05-02 ENCOUNTER — CHART COPY (OUTPATIENT)
Age: 83
End: 2018-05-02

## 2018-05-02 LAB — INR PPP: 1.88

## 2018-05-03 ENCOUNTER — APPOINTMENT (OUTPATIENT)
Dept: CARDIOLOGY | Facility: CLINIC | Age: 83
End: 2018-05-03
Payer: MEDICARE

## 2018-05-03 VITALS
HEART RATE: 70 BPM | SYSTOLIC BLOOD PRESSURE: 135 MMHG | DIASTOLIC BLOOD PRESSURE: 67 MMHG | HEIGHT: 61 IN | BODY MASS INDEX: 21.14 KG/M2 | OXYGEN SATURATION: 92 % | WEIGHT: 112 LBS

## 2018-05-03 VITALS — DIASTOLIC BLOOD PRESSURE: 60 MMHG | SYSTOLIC BLOOD PRESSURE: 128 MMHG

## 2018-05-03 PROCEDURE — 99214 OFFICE O/P EST MOD 30 MIN: CPT

## 2018-05-03 PROCEDURE — 93000 ELECTROCARDIOGRAM COMPLETE: CPT

## 2018-05-15 LAB — INR PPP: 5.62

## 2018-05-17 LAB — INR PPP: 3.33

## 2018-05-25 LAB — INR PPP: 1.88

## 2018-06-10 LAB — INR PPP: 4.05

## 2018-06-13 NOTE — ED ADULT NURSE NOTE - GASTROINTESTINAL WDL
REASON FOR FOLLOW UP:  COPD, follow-up on CT scan of the chest    HISTORY OF PRESENT ILLNESS:  The patient is back for follow-up, he has been stable, he short of breath after a block or 2, occasional cough, no phlegm, no wheezing, no PND, no orthopnea, symptoms are aggravated with activity and relieved by rest    He is supposed to be an option at 22 L/m with activity, he is not carrying his oxygen with him today    REVIEW OF SYSTEMS:  All other systems reviewed and were unremarkable    Current Outpatient Prescriptions   Medication   • lisinopril (PRINIVIL,ZESTRIL) 40 MG tablet   • albuterol 108 (90 Base) MCG/ACT inhaler   • mometasone-formoterol (DULERA) 100-5 MCG/ACT inhaler   • sevelamer (RENAGEL) 800 MG tablet   • amLODIPine (NORVASC) 10 MG tablet   • tiotropium (SPIRIVA HANDIHALER) 18 MCG capsule for inhaler   • B Complex-C-Folic Acid (DIALYVITE) tablet   • cloNIDine (CATAPRES) 0.1 MG tablet   • cinacalcet (SENSIPAR) 60 MG tablet   • hydrALAZINE (APRESOLINE) 25 MG tablet   • furosemide (LASIX) 40 MG tablet   • sevelamer carbonate (RENVELA) 800 MG tablet   • Iron Sucrose (VENOFER IV)   • Doxercalciferol (HECTOROL IV)   • Epoetin Yuriy (EPOGEN IJ)   • carvedilol (COREG) 25 MG tablet   • acetaminophen (TYLENOL) 500 MG tablet     No current facility-administered medications for this visit.        PHYSICAL EXAMINATION:  Visit Vitals  /80   Pulse 83   Resp 20   Ht 5' 1\" (1.549 m)   Wt 53.5 kg   SpO2 94% Comment: on room air   BMI 22.30 kg/m²     General: Not in distress  Head: Normocephalic atraumatic  Neck: supple, no JVD, no lymphadenopathy  Heart: Normal S1, S2  Chest: Decreased breath sounds bilateral, normal vesicular breathing, no crackles, no wheezing.  Abdomen: Soft, non tender, good bowel sounds  Lower Extremities: No edema  Neuro: Awake and oriented, nofocal deficit  Musculoskeletal: no focal deformity     LABS/RADIOLOGY/PFT:     CT scan of the chest from 6/8/2018, reviewed personally:IMPRESSION:  1.  Dominant spiculated nodule, right upper lobe, has significantly  decreased in size/volume from the prior study. Supportive of infectious or  inflammatory etiology.  2. Right middle lobe spiculated nodule has also slightly decreased in size.  3. Several new patchy and nodular densities have developed in both lungs.  4. Given the waxing/waning pulmonary abnormalities, an  infectious/inflammatory etiology is favored, malignancy less likely.  Recommend repeat chest CT in 3-6 months    Home oxygen evaluation from 5/30/2018: He needs 2 L/m with activity    CT scan of the chest from 4/7/2018, reviewed personally: 2.5 cm spiculated right upper lobe nodule     PFT from 4/10/2017: Moderately severe obstructive lung disease with an FEV1 of 51% of predicted, mild restrictive pattern with a TLC at 77% predicted, diffusion capacity at 45% of predicted    ASSESSMENT:  Abnormal CT scan of the chest as above, with right upper lobe dominant nodule/mass, this has significantly improved, this is likely inflammatory/infectious in nature, they are still persistent small bilateral nodules, of unknown clinical significance    Moderate COPD, stable without exacerbation    Chronic hypoxemic respiratory failure on home oxygen at 2 L/m with activity with suboptimal compliance    PLAN:  Continue the current inhalers  Instructed to use oxygen at 2 L/m with activity  Follow-up CT scan in 6 months  Follow-up with me in 3 months    Electronically Signed: 6/13/2018        Arturo Winchester M.D. ANANDCBHARATH.  Board Certified in Internal Medicine, Pulmonary, Critical Care and Sleep Medicine       Abdomen soft, nontender, nondistended, bowel sounds present in all 4 quadrants.

## 2018-06-22 LAB — INR PPP: 1.91

## 2018-07-05 ENCOUNTER — APPOINTMENT (OUTPATIENT)
Dept: CARDIOLOGY | Facility: CLINIC | Age: 83
End: 2018-07-05

## 2018-07-06 ENCOUNTER — APPOINTMENT (OUTPATIENT)
Dept: CARDIOLOGY | Facility: CLINIC | Age: 83
End: 2018-07-06
Payer: MEDICARE

## 2018-07-06 LAB — INR PPP: 2.6

## 2018-07-06 PROCEDURE — 99214 OFFICE O/P EST MOD 30 MIN: CPT

## 2018-07-06 PROCEDURE — 93000 ELECTROCARDIOGRAM COMPLETE: CPT

## 2018-07-10 VITALS — DIASTOLIC BLOOD PRESSURE: 70 MMHG | HEART RATE: 73 BPM | OXYGEN SATURATION: 94 % | SYSTOLIC BLOOD PRESSURE: 140 MMHG

## 2018-07-16 NOTE — PRE-OP CHECKLIST - LATEX ALLERGY
Order clarified with Marcelle and RN Supervisor Aaron    
Will route to Dr. Rodriguez's office.     
kaylan from iv therapy called in has some questions about a order that dr muhammad wrote for [facility]. Please advise. [phone]  
no

## 2018-07-20 ENCOUNTER — INPATIENT (INPATIENT)
Facility: HOSPITAL | Age: 83
LOS: 11 days | Discharge: HOME | DRG: 872 | End: 2018-08-01
Attending: FAMILY MEDICINE | Admitting: HOSPITALIST
Payer: MEDICARE

## 2018-07-20 VITALS
WEIGHT: 115.96 LBS | HEIGHT: 63 IN | TEMPERATURE: 101 F | OXYGEN SATURATION: 95 % | HEART RATE: 115 BPM | RESPIRATION RATE: 18 BRPM | SYSTOLIC BLOOD PRESSURE: 138 MMHG | DIASTOLIC BLOOD PRESSURE: 84 MMHG

## 2018-07-20 DIAGNOSIS — Z96.7 PRESENCE OF OTHER BONE AND TENDON IMPLANTS: Chronic | ICD-10-CM

## 2018-07-20 DIAGNOSIS — Z98.49 CATARACT EXTRACTION STATUS, UNSPECIFIED EYE: Chronic | ICD-10-CM

## 2018-07-20 DIAGNOSIS — Z41.9 ENCOUNTER FOR PROCEDURE FOR PURPOSES OTHER THAN REMEDYING HEALTH STATE, UNSPECIFIED: Chronic | ICD-10-CM

## 2018-07-20 DIAGNOSIS — N20.0 CALCULUS OF KIDNEY: Chronic | ICD-10-CM

## 2018-07-20 DIAGNOSIS — Z98.89 OTHER SPECIFIED POSTPROCEDURAL STATES: Chronic | ICD-10-CM

## 2018-07-20 LAB
ALBUMIN SERPL ELPH-MCNC: 3.1 G/DL — LOW (ref 3.3–5)
ALP SERPL-CCNC: 326 U/L — HIGH (ref 40–120)
ALT FLD-CCNC: 106 U/L — HIGH (ref 12–78)
ANION GAP SERPL CALC-SCNC: 9 MMOL/L — SIGNIFICANT CHANGE UP (ref 5–17)
APPEARANCE UR: CLEAR — SIGNIFICANT CHANGE UP
AST SERPL-CCNC: 59 U/L — HIGH (ref 15–37)
BASOPHILS # BLD AUTO: 0.03 K/UL — SIGNIFICANT CHANGE UP (ref 0–0.2)
BASOPHILS NFR BLD AUTO: 0.3 % — SIGNIFICANT CHANGE UP (ref 0–2)
BILIRUB SERPL-MCNC: 0.7 MG/DL — SIGNIFICANT CHANGE UP (ref 0.2–1.2)
BILIRUB UR-MCNC: NEGATIVE — SIGNIFICANT CHANGE UP
BUN SERPL-MCNC: 33 MG/DL — HIGH (ref 7–23)
CALCIUM SERPL-MCNC: 8.4 MG/DL — LOW (ref 8.5–10.1)
CHLORIDE SERPL-SCNC: 107 MMOL/L — SIGNIFICANT CHANGE UP (ref 96–108)
CO2 SERPL-SCNC: 24 MMOL/L — SIGNIFICANT CHANGE UP (ref 22–31)
COLOR SPEC: YELLOW — SIGNIFICANT CHANGE UP
CREAT SERPL-MCNC: 1.3 MG/DL — SIGNIFICANT CHANGE UP (ref 0.5–1.3)
DIFF PNL FLD: ABNORMAL
EOSINOPHIL # BLD AUTO: 0.08 K/UL — SIGNIFICANT CHANGE UP (ref 0–0.5)
EOSINOPHIL NFR BLD AUTO: 0.7 % — SIGNIFICANT CHANGE UP (ref 0–6)
GLUCOSE SERPL-MCNC: 102 MG/DL — HIGH (ref 70–99)
GLUCOSE UR QL: NEGATIVE — SIGNIFICANT CHANGE UP
HCT VFR BLD CALC: 30.6 % — LOW (ref 34.5–45)
HGB BLD-MCNC: 9.8 G/DL — LOW (ref 11.5–15.5)
IMM GRANULOCYTES NFR BLD AUTO: 1 % — SIGNIFICANT CHANGE UP (ref 0–1.5)
INR PPP: 3.37
KETONES UR-MCNC: NEGATIVE — SIGNIFICANT CHANGE UP
LACTATE SERPL-SCNC: 0.7 MMOL/L — SIGNIFICANT CHANGE UP (ref 0.7–2)
LEUKOCYTE ESTERASE UR-ACNC: ABNORMAL
LYMPHOCYTES # BLD AUTO: 1.13 K/UL — SIGNIFICANT CHANGE UP (ref 1–3.3)
LYMPHOCYTES # BLD AUTO: 9.8 % — LOW (ref 13–44)
MCHC RBC-ENTMCNC: 26.3 PG — LOW (ref 27–34)
MCHC RBC-ENTMCNC: 32 GM/DL — SIGNIFICANT CHANGE UP (ref 32–36)
MCV RBC AUTO: 82 FL — SIGNIFICANT CHANGE UP (ref 80–100)
MONOCYTES # BLD AUTO: 0.93 K/UL — HIGH (ref 0–0.9)
MONOCYTES NFR BLD AUTO: 8.1 % — SIGNIFICANT CHANGE UP (ref 2–14)
NEUTROPHILS # BLD AUTO: 9.26 K/UL — HIGH (ref 1.8–7.4)
NEUTROPHILS NFR BLD AUTO: 80.1 % — HIGH (ref 43–77)
NITRITE UR-MCNC: NEGATIVE — SIGNIFICANT CHANGE UP
PH UR: 5 — SIGNIFICANT CHANGE UP (ref 5–8)
PLATELET # BLD AUTO: 265 K/UL — SIGNIFICANT CHANGE UP (ref 150–400)
POTASSIUM SERPL-MCNC: 4.3 MMOL/L — SIGNIFICANT CHANGE UP (ref 3.5–5.3)
POTASSIUM SERPL-SCNC: 4.3 MMOL/L — SIGNIFICANT CHANGE UP (ref 3.5–5.3)
PROT SERPL-MCNC: 6.9 G/DL — SIGNIFICANT CHANGE UP (ref 6–8.3)
PROT UR-MCNC: 75 MG/DL
RBC # BLD: 3.73 M/UL — LOW (ref 3.8–5.2)
RBC # FLD: 17.5 % — HIGH (ref 10.3–14.5)
SODIUM SERPL-SCNC: 140 MMOL/L — SIGNIFICANT CHANGE UP (ref 135–145)
SP GR SPEC: 1 — LOW (ref 1.01–1.02)
UROBILINOGEN FLD QL: NEGATIVE — SIGNIFICANT CHANGE UP
WBC # BLD: 11.55 K/UL — HIGH (ref 3.8–10.5)
WBC # FLD AUTO: 11.55 K/UL — HIGH (ref 3.8–10.5)

## 2018-07-20 PROCEDURE — 71045 X-RAY EXAM CHEST 1 VIEW: CPT | Mod: 26

## 2018-07-20 RX ORDER — SODIUM CHLORIDE 9 MG/ML
3 INJECTION INTRAMUSCULAR; INTRAVENOUS; SUBCUTANEOUS ONCE
Qty: 0 | Refills: 0 | Status: COMPLETED | OUTPATIENT
Start: 2018-07-20 | End: 2018-07-20

## 2018-07-20 RX ORDER — SODIUM CHLORIDE 9 MG/ML
1000 INJECTION INTRAMUSCULAR; INTRAVENOUS; SUBCUTANEOUS ONCE
Qty: 0 | Refills: 0 | Status: COMPLETED | OUTPATIENT
Start: 2018-07-20 | End: 2018-07-20

## 2018-07-20 RX ORDER — IBUPROFEN 200 MG
600 TABLET ORAL ONCE
Qty: 0 | Refills: 0 | Status: COMPLETED | OUTPATIENT
Start: 2018-07-20 | End: 2018-07-20

## 2018-07-20 RX ORDER — CEFTRIAXONE 500 MG/1
1 INJECTION, POWDER, FOR SOLUTION INTRAMUSCULAR; INTRAVENOUS ONCE
Qty: 0 | Refills: 0 | Status: COMPLETED | OUTPATIENT
Start: 2018-07-20 | End: 2018-07-20

## 2018-07-20 RX ADMIN — SODIUM CHLORIDE 2000 MILLILITER(S): 9 INJECTION INTRAMUSCULAR; INTRAVENOUS; SUBCUTANEOUS at 21:16

## 2018-07-20 RX ADMIN — Medication 600 MILLIGRAM(S): at 21:16

## 2018-07-20 RX ADMIN — SODIUM CHLORIDE 3 MILLILITER(S): 9 INJECTION INTRAMUSCULAR; INTRAVENOUS; SUBCUTANEOUS at 21:19

## 2018-07-20 RX ADMIN — SODIUM CHLORIDE 1000 MILLILITER(S): 9 INJECTION INTRAMUSCULAR; INTRAVENOUS; SUBCUTANEOUS at 22:30

## 2018-07-20 RX ADMIN — SODIUM CHLORIDE 2000 MILLILITER(S): 9 INJECTION INTRAMUSCULAR; INTRAVENOUS; SUBCUTANEOUS at 22:58

## 2018-07-20 RX ADMIN — SODIUM CHLORIDE 2000 MILLILITER(S): 9 INJECTION INTRAMUSCULAR; INTRAVENOUS; SUBCUTANEOUS at 21:58

## 2018-07-20 RX ADMIN — Medication 600 MILLIGRAM(S): at 22:08

## 2018-07-20 RX ADMIN — SODIUM CHLORIDE 1000 MILLILITER(S): 9 INJECTION INTRAMUSCULAR; INTRAVENOUS; SUBCUTANEOUS at 21:45

## 2018-07-20 RX ADMIN — CEFTRIAXONE 1 GRAM(S): 500 INJECTION, POWDER, FOR SOLUTION INTRAMUSCULAR; INTRAVENOUS at 22:40

## 2018-07-20 RX ADMIN — CEFTRIAXONE 100 GRAM(S): 500 INJECTION, POWDER, FOR SOLUTION INTRAMUSCULAR; INTRAVENOUS at 22:09

## 2018-07-20 NOTE — ED ADULT NURSE NOTE - OBJECTIVE STATEMENT
received pt in bed #17a Pt A&O states last week had chills, weakness & diarrhea x 3 days ...saw PMD on Wednesday & was cleared..today developed fever, chills, lower back pain & weakness. CM placed w/ SR EKG done Rectal temp 102 Pt straight cath for clear yellow urine (250cc) Pt seen by Dr De Oliveira Will monitor

## 2018-07-20 NOTE — ED PROVIDER NOTE - OBJECTIVE STATEMENT
84 y/o F brought in by her family for weakness, fevers x 1 week.  Pt had diarrhea x 3 days which has now resolved and was seen by PCP during that time.  Pt denies fevers at the time.  Last 3 days pt developed fevers and worsening weakness.  Pt denies cough, chest pain, SOB, urinary symptoms, sick contacts, recent travel. 82 y/o F brought in by her family for weakness, fevers x 1 week.  Pt had diarrhea x 3 days which has now resolved and was seen by PCP during that time.  Pt denies fevers at the time.  Last 3 days pt developed fevers and worsening weakness.  Pt denies cough, chest pain, SOB, urinary symptoms, sick contacts, recent travel. pt has had hx of gallstones.  PCP: Dr. Lockhart

## 2018-07-20 NOTE — ED ADULT NURSE REASSESSMENT NOTE - NS ED NURSE REASSESS COMMENT FT1
Report taken from Samia CLARK RN. Pt received on stretcher alert and oriented x 3, no acute distress.

## 2018-07-21 DIAGNOSIS — F32.9 MAJOR DEPRESSIVE DISORDER, SINGLE EPISODE, UNSPECIFIED: ICD-10-CM

## 2018-07-21 DIAGNOSIS — I71.4 ABDOMINAL AORTIC ANEURYSM, WITHOUT RUPTURE: ICD-10-CM

## 2018-07-21 DIAGNOSIS — Z29.9 ENCOUNTER FOR PROPHYLACTIC MEASURES, UNSPECIFIED: ICD-10-CM

## 2018-07-21 DIAGNOSIS — R74.8 ABNORMAL LEVELS OF OTHER SERUM ENZYMES: ICD-10-CM

## 2018-07-21 DIAGNOSIS — N39.0 URINARY TRACT INFECTION, SITE NOT SPECIFIED: ICD-10-CM

## 2018-07-21 DIAGNOSIS — E78.5 HYPERLIPIDEMIA, UNSPECIFIED: ICD-10-CM

## 2018-07-21 DIAGNOSIS — I10 ESSENTIAL (PRIMARY) HYPERTENSION: ICD-10-CM

## 2018-07-21 DIAGNOSIS — I73.9 PERIPHERAL VASCULAR DISEASE, UNSPECIFIED: ICD-10-CM

## 2018-07-21 DIAGNOSIS — D64.9 ANEMIA, UNSPECIFIED: ICD-10-CM

## 2018-07-21 DIAGNOSIS — A41.9 SEPSIS, UNSPECIFIED ORGANISM: ICD-10-CM

## 2018-07-21 DIAGNOSIS — I48.91 UNSPECIFIED ATRIAL FIBRILLATION: ICD-10-CM

## 2018-07-21 PROBLEM — M86.9 OSTEOMYELITIS, UNSPECIFIED: Chronic | Status: ACTIVE | Noted: 2018-02-04

## 2018-07-21 LAB
ANION GAP SERPL CALC-SCNC: 8 MMOL/L — SIGNIFICANT CHANGE UP (ref 5–17)
BASOPHILS # BLD AUTO: 0.02 K/UL — SIGNIFICANT CHANGE UP (ref 0–0.2)
BASOPHILS NFR BLD AUTO: 0.2 % — SIGNIFICANT CHANGE UP (ref 0–2)
BUN SERPL-MCNC: 30 MG/DL — HIGH (ref 7–23)
CALCIUM SERPL-MCNC: 7.6 MG/DL — LOW (ref 8.5–10.1)
CHLORIDE SERPL-SCNC: 114 MMOL/L — HIGH (ref 96–108)
CO2 SERPL-SCNC: 24 MMOL/L — SIGNIFICANT CHANGE UP (ref 22–31)
CREAT SERPL-MCNC: 1.1 MG/DL — SIGNIFICANT CHANGE UP (ref 0.5–1.3)
EOSINOPHIL # BLD AUTO: 0.08 K/UL — SIGNIFICANT CHANGE UP (ref 0–0.5)
EOSINOPHIL NFR BLD AUTO: 1 % — SIGNIFICANT CHANGE UP (ref 0–6)
GLUCOSE SERPL-MCNC: 88 MG/DL — SIGNIFICANT CHANGE UP (ref 70–99)
HCT VFR BLD CALC: 27.4 % — LOW (ref 34.5–45)
HGB BLD-MCNC: 8.6 G/DL — LOW (ref 11.5–15.5)
IMM GRANULOCYTES NFR BLD AUTO: 0.6 % — SIGNIFICANT CHANGE UP (ref 0–1.5)
INR BLD: 3.27 RATIO — HIGH (ref 0.88–1.16)
LYMPHOCYTES # BLD AUTO: 1.69 K/UL — SIGNIFICANT CHANGE UP (ref 1–3.3)
LYMPHOCYTES # BLD AUTO: 20.4 % — SIGNIFICANT CHANGE UP (ref 13–44)
MCHC RBC-ENTMCNC: 26 PG — LOW (ref 27–34)
MCHC RBC-ENTMCNC: 31.4 GM/DL — LOW (ref 32–36)
MCV RBC AUTO: 82.8 FL — SIGNIFICANT CHANGE UP (ref 80–100)
MONOCYTES # BLD AUTO: 0.7 K/UL — SIGNIFICANT CHANGE UP (ref 0–0.9)
MONOCYTES NFR BLD AUTO: 8.5 % — SIGNIFICANT CHANGE UP (ref 2–14)
NEUTROPHILS # BLD AUTO: 5.74 K/UL — SIGNIFICANT CHANGE UP (ref 1.8–7.4)
NEUTROPHILS NFR BLD AUTO: 69.3 % — SIGNIFICANT CHANGE UP (ref 43–77)
PLATELET # BLD AUTO: 231 K/UL — SIGNIFICANT CHANGE UP (ref 150–400)
POTASSIUM SERPL-MCNC: 4.1 MMOL/L — SIGNIFICANT CHANGE UP (ref 3.5–5.3)
POTASSIUM SERPL-SCNC: 4.1 MMOL/L — SIGNIFICANT CHANGE UP (ref 3.5–5.3)
PROTHROM AB SERPL-ACNC: 36.5 SEC — HIGH (ref 9.8–12.7)
RAPID RVP RESULT: SIGNIFICANT CHANGE UP
RBC # BLD: 3.31 M/UL — LOW (ref 3.8–5.2)
RBC # FLD: 17.5 % — HIGH (ref 10.3–14.5)
SODIUM SERPL-SCNC: 146 MMOL/L — HIGH (ref 135–145)
WBC # BLD: 8.28 K/UL — SIGNIFICANT CHANGE UP (ref 3.8–10.5)
WBC # FLD AUTO: 8.28 K/UL — SIGNIFICANT CHANGE UP (ref 3.8–10.5)

## 2018-07-21 PROCEDURE — 76705 ECHO EXAM OF ABDOMEN: CPT | Mod: 26

## 2018-07-21 PROCEDURE — 99223 1ST HOSP IP/OBS HIGH 75: CPT | Mod: AI,GC

## 2018-07-21 PROCEDURE — 12345: CPT

## 2018-07-21 PROCEDURE — 99285 EMERGENCY DEPT VISIT HI MDM: CPT

## 2018-07-21 PROCEDURE — 74177 CT ABD & PELVIS W/CONTRAST: CPT | Mod: 26

## 2018-07-21 RX ORDER — PANTOPRAZOLE SODIUM 20 MG/1
40 TABLET, DELAYED RELEASE ORAL
Qty: 0 | Refills: 0 | Status: DISCONTINUED | OUTPATIENT
Start: 2018-07-21 | End: 2018-08-01

## 2018-07-21 RX ORDER — HYDRALAZINE HCL 50 MG
50 TABLET ORAL THREE TIMES A DAY
Qty: 0 | Refills: 0 | Status: DISCONTINUED | OUTPATIENT
Start: 2018-07-21 | End: 2018-08-01

## 2018-07-21 RX ORDER — POLYETHYLENE GLYCOL 3350 17 G/17G
17 POWDER, FOR SOLUTION ORAL DAILY
Qty: 0 | Refills: 0 | Status: DISCONTINUED | OUTPATIENT
Start: 2018-07-21 | End: 2018-07-26

## 2018-07-21 RX ORDER — GABAPENTIN 400 MG/1
100 CAPSULE ORAL THREE TIMES A DAY
Qty: 0 | Refills: 0 | Status: DISCONTINUED | OUTPATIENT
Start: 2018-07-21 | End: 2018-08-01

## 2018-07-21 RX ORDER — MIRTAZAPINE 45 MG/1
7.5 TABLET, ORALLY DISINTEGRATING ORAL AT BEDTIME
Qty: 0 | Refills: 0 | Status: DISCONTINUED | OUTPATIENT
Start: 2018-07-21 | End: 2018-08-01

## 2018-07-21 RX ORDER — ATORVASTATIN CALCIUM 80 MG/1
40 TABLET, FILM COATED ORAL AT BEDTIME
Qty: 0 | Refills: 0 | Status: DISCONTINUED | OUTPATIENT
Start: 2018-07-21 | End: 2018-08-01

## 2018-07-21 RX ORDER — ASPIRIN/CALCIUM CARB/MAGNESIUM 324 MG
81 TABLET ORAL DAILY
Qty: 0 | Refills: 0 | Status: DISCONTINUED | OUTPATIENT
Start: 2018-07-21 | End: 2018-08-01

## 2018-07-21 RX ORDER — CEFTRIAXONE 500 MG/1
1 INJECTION, POWDER, FOR SOLUTION INTRAMUSCULAR; INTRAVENOUS EVERY 24 HOURS
Qty: 0 | Refills: 0 | Status: DISCONTINUED | OUTPATIENT
Start: 2018-07-21 | End: 2018-07-21

## 2018-07-21 RX ORDER — METOPROLOL TARTRATE 50 MG
25 TABLET ORAL EVERY 6 HOURS
Qty: 0 | Refills: 0 | Status: DISCONTINUED | OUTPATIENT
Start: 2018-07-21 | End: 2018-07-30

## 2018-07-21 RX ORDER — DOCUSATE SODIUM 100 MG
100 CAPSULE ORAL
Qty: 0 | Refills: 0 | Status: DISCONTINUED | OUTPATIENT
Start: 2018-07-21 | End: 2018-07-26

## 2018-07-21 RX ORDER — SENNA PLUS 8.6 MG/1
2 TABLET ORAL AT BEDTIME
Qty: 0 | Refills: 0 | Status: DISCONTINUED | OUTPATIENT
Start: 2018-07-21 | End: 2018-07-22

## 2018-07-21 RX ORDER — ENOXAPARIN SODIUM 100 MG/ML
45 INJECTION SUBCUTANEOUS
Qty: 0 | Refills: 0 | COMMUNITY

## 2018-07-21 RX ORDER — ACETAMINOPHEN 500 MG
650 TABLET ORAL EVERY 8 HOURS
Qty: 0 | Refills: 0 | Status: DISCONTINUED | OUTPATIENT
Start: 2018-07-21 | End: 2018-08-01

## 2018-07-21 RX ORDER — CEFTRIAXONE 500 MG/1
1 INJECTION, POWDER, FOR SOLUTION INTRAMUSCULAR; INTRAVENOUS EVERY 24 HOURS
Qty: 0 | Refills: 0 | Status: DISCONTINUED | OUTPATIENT
Start: 2018-07-21 | End: 2018-07-23

## 2018-07-21 RX ADMIN — Medication 25 MILLIGRAM(S): at 18:07

## 2018-07-21 RX ADMIN — Medication 25 MILLIGRAM(S): at 06:19

## 2018-07-21 RX ADMIN — PANTOPRAZOLE SODIUM 40 MILLIGRAM(S): 20 TABLET, DELAYED RELEASE ORAL at 10:25

## 2018-07-21 RX ADMIN — GABAPENTIN 100 MILLIGRAM(S): 400 CAPSULE ORAL at 21:58

## 2018-07-21 RX ADMIN — Medication 25 MILLIGRAM(S): at 13:00

## 2018-07-21 RX ADMIN — Medication 50 MILLIGRAM(S): at 06:19

## 2018-07-21 RX ADMIN — ATORVASTATIN CALCIUM 40 MILLIGRAM(S): 80 TABLET, FILM COATED ORAL at 21:58

## 2018-07-21 RX ADMIN — CEFTRIAXONE 100 GRAM(S): 500 INJECTION, POWDER, FOR SOLUTION INTRAMUSCULAR; INTRAVENOUS at 22:04

## 2018-07-21 RX ADMIN — Medication 50 MILLIGRAM(S): at 21:58

## 2018-07-21 RX ADMIN — Medication 1 TABLET(S): at 06:19

## 2018-07-21 RX ADMIN — Medication 81 MILLIGRAM(S): at 13:00

## 2018-07-21 RX ADMIN — Medication 50 MILLIGRAM(S): at 13:00

## 2018-07-21 RX ADMIN — GABAPENTIN 100 MILLIGRAM(S): 400 CAPSULE ORAL at 06:19

## 2018-07-21 RX ADMIN — GABAPENTIN 100 MILLIGRAM(S): 400 CAPSULE ORAL at 13:00

## 2018-07-21 RX ADMIN — SENNA PLUS 2 TABLET(S): 8.6 TABLET ORAL at 21:58

## 2018-07-21 RX ADMIN — MIRTAZAPINE 7.5 MILLIGRAM(S): 45 TABLET, ORALLY DISINTEGRATING ORAL at 21:58

## 2018-07-21 NOTE — H&P ADULT - PROBLEM SELECTOR PLAN 10
IMPROVE VTE Individual Risk Assessment          RISK                                                          Points    [  ] Previous VTE                                                3  [  ] Thrombophilia                                             2  [  ] Lower limb paralysis                                   2        (unable to hold up >15 seconds)    [  ] Current Cancer                                            2         (within 6 months)  [  ] Immobilization > 24 hrs                              1  [  ] ICU/CCU stay > 24 hours                            1  [ X  ] Age > 60                                                    1    IMPROVE VTE Score 1    Padua 5 (with reduced mobility due to weakness, lethargy)   supratherapeutic INR  Add GI PPx

## 2018-07-21 NOTE — H&P ADULT - PROBLEM SELECTOR PLAN 8
- ASA 81 mg PO daily   - gabapentin 100 mg PO TID for resulting neuropathy  occluded stent also seen on prior CT 10/2017  consider vascular consult/follow up

## 2018-07-21 NOTE — H&P ADULT - PROBLEM SELECTOR PLAN 2
- patient took coumadin on presentation 7/20  - f/u PT/INR labs and dose coumadin accordingly  - metoprolol tartrate 25 mg PO daily  - diltiazem 30 mg PO daily - patient took coumadin on presentation 7/20  supratherapeutic today, iold dose today     - metoprolol tartrate 25 mg PO daily  - diltiazem 30 mg PO daily

## 2018-07-21 NOTE — H&P ADULT - PROBLEM SELECTOR PLAN 5
- ASA 81 mg PO daily   - gabapentin 100 mg PO TID for resulting neuropathy - metoprolol tartrate 25 mg PO daily

## 2018-07-21 NOTE — H&P ADULT - NSHPREVIEWOFSYSTEMS_GEN_ALL_CORE
Constitutional: endorses fever, chills, diaphoresis, general weakness and malaise  HEENT: denies blurry vision, difficulty hearing  Respiratory: denies SOB, CRABTREE, cough, sputum production, wheezing, hemoptysis  Cardiovascular: denies chest pain, palpitations, edema  Gastrointestinal: endorses diarrhea. denies nausea, vomiting, constipation, abdominal pain, melena, hematochezia   Genitourinary: endorses nocturia and urinary incontinence. denies dysuria, frequency, urgency, hematuria   Skin/Breast: denies rash, itching  Musculoskeletal: denies myalgias, joint swelling  Neurologic: endorses RLE paresthesias/pain, denies headache, weakness, dizziness, numbness/tingling  Psychiatric: denies feeling anxious, depressed, suicidal, homicidal thoughts  Hematology/Oncology: denies bruising, tender or enlarged lymph nodes   ROS negative except as noted above

## 2018-07-21 NOTE — H&P ADULT - PROBLEM SELECTOR PLAN 7
IMPROVE VTE Individual Risk Assessment          RISK                                                          Points    [  ] Previous VTE                                                3  [  ] Thrombophilia                                             2  [  ] Lower limb paralysis                                   2        (unable to hold up >15 seconds)    [  ] Current Cancer                                            2         (within 6 months)  [  ] Immobilization > 24 hrs                              1  [  ] ICU/CCU stay > 24 hours                            1  [ X  ] Age > 60                                                    1    IMPROVE VTE Score 1    Padua 5 (with reduced mobility due to weakness, lethargy)     patient on AC with coumadin, hold heparin/lovenox and f/u PT/INR AM labs small size, asymptomatic  stable compared to CT on 10/30/2017  consider vascular consult vs outpatient f/u and repeat CT  cont statin and BB

## 2018-07-21 NOTE — H&P ADULT - NSHPPHYSICALEXAM_GEN_ALL_CORE
Physical Exam:  General: Well developed, lethargic but arousable, lying in bed, most of history with eyes intermittently closed   HEENT: NCAT, PERRLA, EOMI bl, moist mucous membranes   Neck: Supple, nontender, no mass  Neurology: A&Ox3, nonfocal, CN II-XII grossly intact, sensation intact  Respiratory: CTA B/L, No W/R/R  CV: RRR, +S1/S2, no murmurs, rubs or gallops  Abdominal: Soft, some suprapubic tenderness to light palpation, no rebound, no guarding   Extremities: No C/C/E, + peripheral pulses  MSK: Normal ROM, no joint erythema or warmth, no joint swelling   Skin: warm, dry, normal color, no rash or abnormal lesions

## 2018-07-21 NOTE — PROGRESS NOTE ADULT - ASSESSMENT
82 yo female w/ PMH of afib on coumadin, CVA and hx of aneurysm, HTN, HLD, mitral valve prolapse, PVD s/p stents x 3 (RLE), rheumatoid arthritis, who presents with one week of fever (measured at home, pt doesn't remember temp), diaphoresis chills, weakness, and lethargy, found with T 102 and  and found to have WBC 11.52 with 80.1% PMNs, and UA with small leukocyte esterase, 6-10 WBCs, moderate bacteria consistent with sirs poss sepsis sec to uti vs asx bacteriuria, culture workup underway

## 2018-07-21 NOTE — PROGRESS NOTE ADULT - SUBJECTIVE AND OBJECTIVE BOX
Patient is a 83y old  Female who presents with a chief complaint of fever, x 1 week, weakness (2018 08:35)      INTERVAL HPI: Pt seen and examined. Pt states she still feels weak, incontinence is old problem but has been feeling lethargic. Denies any other acute complaints at this time.,    OVERNIGHT EVENTS: fever  T(F): 97.7 (18 @ 08:40), Max: 102 (18 @ 20:55)  HR: 73 (18 @ 12:55) (71 - 115)  BP: 115/73 (18 @ 12:55) (115/73 - 159/74)  RR: 16 (18 @ 08:40) (15 - 18)  SpO2: 98% (18 @ 08:40) (95% - 98%)  I&O's Summary      REVIEW OF SYSTEMS: 12 systems noncontributory other than that stated in hpi    PHYSICAL EXAM:  GENERAL: NAD, eloder, frail  HEAD:  Atraumatic, Normocephalic  EYES: EOMI, PERRLA, conjunctiva and sclera clear  ENMT: No tonsillar erythema, exudates, or enlargement; Moist mucous membranes, Good dentition, No lesions  NECK: Supple, No JVD  NERVOUS SYSTEM:  Alert & Oriented X3, Good concentration; Motor Strength 4/5 B/L upper and lower extremities  CHEST/LUNG: Clear to percussion bilaterally; No rales, rhonchi, wheezing, or rubs  HEART: Regular rate and rhythm; No murmurs, rubs, or gallops  ABDOMEN: Soft, Nontender, Nondistended; Bowel sounds present  EXTREMITIES:  2+ Peripheral Pulses, No clubbing, cyanosis, or edema  SKIN: No rashes or lesions    LABS:                        8.6    8.28  )-----------( 231      ( 2018 04:49 )             27.4     -    146<H>  |  114<H>  |  30<H>  ----------------------------<  88  4.1   |  24  |  1.10    Ca    7.6<L>      2018 04:49    TPro  5.4<L>  /  Alb  2.3<L>  /  TBili  0.5  /  DBili  .20  /  AST  35  /  ALT  75  /  AlkPhos  245<H>  07-21    PT/INR - ( 2018 04:49 )   PT: 36.5 sec;   INR: 3.27 ratio           Urinalysis Basic - ( 2018 21:18 )    Color: Yellow / Appearance: Clear / S.005 / pH: x  Gluc: x / Ketone: Negative  / Bili: Negative / Urobili: Negative   Blood: x / Protein: 75 mg/dL / Nitrite: Negative   Leuk Esterase: Small / RBC: 3-5 /HPF / WBC 6-10   Sq Epi: x / Non Sq Epi: Few / Bacteria: Moderate      CAPILLARY BLOOD GLUCOSE                  MEDICATIONS  (STANDING):  aspirin enteric coated 81 milliGRAM(s) Oral daily  atorvastatin 40 milliGRAM(s) Oral at bedtime  cefTRIAXone   IVPB 1 Gram(s) IV Intermittent every 24 hours  diltiazem    Tablet 30 milliGRAM(s) Oral every 6 hours  gabapentin 100 milliGRAM(s) Oral three times a day  hydrALAZINE 50 milliGRAM(s) Oral three times a day  metoprolol tartrate 25 milliGRAM(s) Oral every 6 hours  mirtazapine 7.5 milliGRAM(s) Oral at bedtime  pantoprazole    Tablet 40 milliGRAM(s) Oral before breakfast  senna 2 Tablet(s) Oral at bedtime    MEDICATIONS  (PRN):  acetaminophen   Tablet 650 milliGRAM(s) Oral every 8 hours PRN Mild Pain (1 - 3)  docusate sodium 100 milliGRAM(s) Oral two times a day PRN Constipation  polyethylene glycol 3350 17 Gram(s) Oral daily PRN Constipation

## 2018-07-21 NOTE — H&P ADULT - HISTORY OF PRESENT ILLNESS
82 yo female w/ PMH of afib on coumadin, CVA and hx of aneurysm, HTN, HLD, mitral valve prolapse, PVD s/p stents x 3 (RLE), rheumatoid arthritis, who presents with one week of fever (measured at home, pt doesn't remember temp), diaphoresis chills, weakness, and lethargy.     The patient was in her usual state of health until one week prior to presentation when she started to feel subjective fevers, chills, and diaphoresis. She then measured her temperature on the day of admission was noted it was "high" though she couldn't remember the exact temperature. She endorsed nocturia and urinary incontinence, though she notes both the nocturia and urinary incontinence are chronic and the frequency remains unchanged from prior.  She denies dysuria, hematuria, urinary frequency, urinary hesitancy.     She denies cough, SOB, CRABTREE, chest pain, palpitations. Of note, she endorses "a few days" of diarrhea that resolved without intervention one week prior to presentation. She denies recent travel, sick contacts.     In the ED, vitals were T 102   /84 RR 19 and Sp02 95%. She received 3 L NS bolus and ceftriaxone. Labs notable for WBC 11.52 with 80.1% PMNs, Allk Phos 326, AST 59, , Hbg 9.8 (baseline), UA with small leukocyte esterase, 6-10 WBCs, moderate bacteria,  CT revealed distended gallbladder containing stones, mild intrahepatic bile duct dilatation with a common bile duct stent in place, US gallbladder revealed cholelithiasis without sonographic evidence of acute cholecystitis. EKG sinus with incomplete right bundle branch block 84 yo female w/ PMH of afib on coumadin, CVA and hx of aneurysm repair, HTN, HLD, mitral valve prolapse, PVD s/p stents x 3 (RLE), rheumatoid arthritis, who presents with one week of fever (measured at home, pt doesn't remember temp), diaphoresis chills, weakness, and lethargy.     The patient was in her usual state of health until one week prior to presentation when she started to feel subjective fevers, chills, and diaphoresis. She then measured her temperature on the day of admission was noted it was "high" though she couldn't remember the exact temperature. She endorsed nocturia and urinary incontinence, though she notes both the nocturia and urinary incontinence are chronic and the frequency remains unchanged from prior.  She denies dysuria, hematuria, urinary frequency, urinary hesitancy.     She denies cough, SOB, CRABTREE, chest pain, palpitations. Of note, she endorses "a few days" of diarrhea that resolved without intervention one week prior to presentation. She denies recent travel, sick contacts.     In the ED, vitals were T 102   /84 RR 19 and Sp02 95%. She received 3 L NS bolus and ceftriaxone. Labs notable for WBC 11.52 with 80.1% PMNs, Allk Phos 326, AST 59, , Hbg 9.8 (baseline), UA with small leukocyte esterase, 6-10 WBCs, moderate bacteria,  CT revealed distended gallbladder containing stones, mild intrahepatic bile duct dilatation with a common bile duct stent in place, US gallbladder revealed cholelithiasis without sonographic evidence of acute cholecystitis. EKG sinus with incomplete right bundle branch block 84 yo female w/ PMH of afib on coumadin, CVA and hx of aneurysm repair, HTN, HLD, mitral valve prolapse, PVD s/p stents x 3 (RLE), rheumatoid arthritis, who presents with one week of subjective fever (measured at home, pt doesn't remember temp), diaphoresis chills, weakness, and lethargy.     The patient was in her usual state of health until one week prior to presentation when she started to feel subjective fevers, chills, and diaphoresis. She then measured her temperature on the day of admission was noted it was "high" though she couldn't remember the exact temperature. She endorsed nocturia and urinary incontinence, though she notes both the nocturia and urinary incontinence are chronic and the frequency remains unchanged from prior.  She denies dysuria, hematuria, urinary frequency, urinary hesitancy.     She denies cough, SOB, CRABTREE, chest pain, palpitations. Of note, she endorses "a few days" of diarrhea that resolved without intervention one week prior to presentation. She denies recent travel, sick contacts.     In the ED, vitals were T 102   /84 RR 19 and Sp02 95%. She received 3 L NS bolus and ceftriaxone. Labs notable for WBC 11.52 with 80.1% PMNs, Allk Phos 326, AST 59, , Hbg 9.8 (baseline), UA with small leukocyte esterase, 6-10 WBCs, moderate bacteria,  CT revealed distended gallbladder containing stones, mild intrahepatic bile duct dilatation with a common bile duct stent in place, US gallbladder revealed cholelithiasis without sonographic evidence of acute cholecystitis. EKG sinus with incomplete right bundle branch block

## 2018-07-21 NOTE — CONSULT NOTE ADULT - SUBJECTIVE AND OBJECTIVE BOX
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 +chills Denies n/v/d Denies abd pain. No dysuria, hematuria,   urinary frequency, cough, SOB, CRABTREE, chest pain, palpitations. Norecent travel, sick contacts. In  ED she was febrile to 102. WBC 11K. UA not impressive. CT a/P showed distended gallbladder   containing stones, mild intrahepatic bile duct dilatation with a common bile duct stent in place.  Pt has no abdominal symptoms.    Infectious Disease consult was called to evaluate pt due to fever.    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  Osteoarthritis  Afib  PVD (peripheral vascular disease)  Neuropathy: (Right lower leg)  H/O cerebral aneurysm repair: brain clips  CVA (cerebral vascular accident): (&quot;Mini-stroke&quot;,1990&#x27;s)  Rheumatoid arthritis  Hyperlipidemia  Hypertension  S/P cataract surgery: (Left eye)  Elective surgery: (&quot;Twisted bowel&quot;, 2014)  Elective surgery: (Exision of cyst on liver, 1985)  S/P ORIF (open reduction internal fixation) fracture: Left hip fx (2012) &amp; R hip fx (2013)  H/O cerebral aneurysm repair: Brain clips (1978(  Renal stone: Cysto stent placement 10/1/2014  PVD (peripheral vascular disease): s/p RLE bypass x 3, most recent 3/2012 Right external iliac to PT bypass w/ PTFE (2012)  S/P FRED (total abdominal hysterectomy): (1987, Hx of &quot;ovarian cancer?&quot;)      Social History--  EtOH: denies  Tobacco: denies   Drug Use: denies   Lives alone    FAMILY HISTORY:  No pertinent family history in first degree relatives    Allergies  No Known Allergies    Home Medications:  acetaminophen 325 mg oral tablet: 2 tab(s) orally every 6 hours, As needed, Mild Pain (1 - 3) (21 Jul 2018 04:38)  aspirin 81 mg oral delayed release tablet: 1 tab(s) orally once a day (21 Jul 2018 04:38)  atorvastatin 40 mg oral tablet: 1 tab(s) orally once a day (at bedtime) (21 Jul 2018 04:38)  dilTIAZem 30 mg oral tablet: 1 tab(s) orally every 6 hours (21 Jul 2018 04:38)  docusate sodium 100 mg oral capsule: 1 cap(s) orally 2 times a day, As needed, Constipation (21 Jul 2018 04:38)  gabapentin 100 mg oral capsule:  orally 3 times a day (21 Jul 2018 04:38)  hydrALAZINE 50 mg oral tablet: 1 tab(s) orally every 8 hours (21 Jul 2018 04:38)  metoprolol tartrate 25 mg oral tablet: 1 tab(s) orally every 6 hours (21 Jul 2018 04:38)  mirtazapine 7.5 mg oral tablet: 1 tab(s) orally once a day (at bedtime) (21 Jul 2018 04:38)  multivitamin:   once a day (21 Jul 2018 04:38)  nystatin 100,000 units/g topical powder: 1 application topically 2 times a day (21 Jul 2018 04:38)  polyethylene glycol 3350 oral powder for reconstitution: 17 gram(s) orally once a day, As needed, Constipation (21 Jul 2018 04:38)  senna oral tablet: 2 tab(s) orally once a day (at bedtime) (21 Jul 2018 04:38)  warfarin 6 mg oral tablet: 1 tab(s) orally once. Daily coumadin dose based upon INR.  (21 Jul 2018 04:38)    Current Inpatient Medications :    ANTIBIOTICS:   cefTRIAXone   IVPB 1 Gram(s) IV Intermittent every 24 hours      OTHER RELEVANT MEDICATIONS :  acetaminophen   Tablet 650 milliGRAM(s) Oral every 8 hours PRN  aspirin enteric coated 81 milliGRAM(s) Oral daily  atorvastatin 40 milliGRAM(s) Oral at bedtime  diltiazem    Tablet 30 milliGRAM(s) Oral every 6 hours  docusate sodium 100 milliGRAM(s) Oral two times a day PRN  gabapentin 100 milliGRAM(s) Oral three times a day  hydrALAZINE 50 milliGRAM(s) Oral three times a day  metoprolol tartrate 25 milliGRAM(s) Oral every 6 hours  mirtazapine 7.5 milliGRAM(s) Oral at bedtime  pantoprazole    Tablet 40 milliGRAM(s) Oral before breakfast  polyethylene glycol 3350 17 Gram(s) Oral daily PRN  senna 2 Tablet(s) Oral at bedtime       ROS:  CONSTITUTIONAL:  + fever or chills,   EYES:  Negative  blurry vision or double vision  CARDIOVASCULAR:  Negative for chest pain or palpitations  RESPIRATORY:  Negative for cough, wheezing, or SOB   GASTROINTESTINAL:  Negative for nausea, vomiting, diarrhea, constipation, or abdominal pain  GENITOURINARY:  Negative frequency, urgency , dysuria or hematuria   NEUROLOGIC:  No headache, confusion, dizziness, lightheadedness  All other systems were reviewed and are negative    Physical Exam:  Vital Signs Last 24 Hrs  T(C): 36.2 (21 Jul 2018 20:13), Max: 36.9 (21 Jul 2018 02:03)  T(F): 97.2 (21 Jul 2018 20:13), Max: 98.4 (21 Jul 2018 02:03)  HR: 71 (21 Jul 2018 20:13) (69 - 96)  BP: 157/77 (21 Jul 2018 20:13) (115/73 - 159/74)  BP(mean): --  RR: 17 (21 Jul 2018 20:13) (15 - 17)  SpO2: 98% (21 Jul 2018 20:13) (96% - 98%)      General: no acute distress  Eyes: sclera anicteric, pupils equal and reactive to light  ENMT: buccal mucosa moist, pharynx not injected  Neck: supple, trachea midline  Lungs: clear, no wheeze/rhonchi  Cardiovascular: regular rate and rhythm, S1 S2  Abdomen: soft, nontender, no organomegaly present, bowel sounds normal  Neurological:  alert and oriented x3, Cranial Nerves II-XII grossly intact  Skin:no increased ecchymosis/petechiae/purpura  Lymph Nodes: no palpable cervical/supraclavicular lymph nodes enlargements  Extremities: no cyanosis/clubbing/edema    Labs:                         8.6    8.28  )-----------( 231      ( 21 Jul 2018 04:49 )             27.4   07-21    146<H>  |  114<H>  |  30<H>  ----------------------------<  88  4.1   |  24  |  1.10    Ca    7.6<L>      21 Jul 2018 04:49    TPro  5.4<L>  /  Alb  2.3<L>  /  TBili  0.5  /  DBili  .20  /  AST  35  /  ALT  75  /  AlkPhos  245<H>  07-21      RECENT CULTURES:  PENDING    RADIOLOGY & ADDITIONAL STUDIES:    EXAM:  CT ABDOMEN AND PELVIS IC                            PROCEDURE DATE:  07/21/2018          INTERPRETATION:  CLINICAL INFORMATION: Fever and diarrhea    COMPARISON: CT abdomen and pelvis 10/31/2017    PROCEDURE:   CT of the Abdomen and Pelvis was performed with intravenous contrast.   Intravenous contrast: 96 ml Omnipaque 350. 4 ml discarded.  Oral contrast: positive contrast was administered.  Sagittal and coronal reformats were performed.    FINDINGS:    LOWER CHEST: Within normal limits.    LIVER: Within normal limits.  BILE DUCTS: Pneumobilia. Mild intrahepatic bile duct dilatation. Common   bile duct stent is seen with distal tip in the duodenal lumen..  GALLBLADDER: Distended gallbladder containing stones.  SPLEEN: Within normal limits.  PANCREAS: Within normal limits.  ADRENALS: 1.4 cm indeterminate right lateral.  KIDNEYS/URETERS: Mild bilateral renal atrophy. Right renal cyst.   Prominent bilateral extrarenal pelves .    BLADDER: Within normal limits.  REPRODUCTIVE ORGANS: Status post hysterectomy.     BOWEL: No bowel obstruction. Appendix normal. Right lower quadrant small   bowel anastomosis seen. Diverticulosis of the descending and sigmoid   colon.  PERITONEUM: No ascites.  VESSELS:  Stable infrarenal abdominal aortic aneurysm measures 3.7 x 2.7   cm. Redemonstration of 3 occluded bypass graft extending from the right   external iliac artery down the leg.  RETROPERITONEUM: No lymphadenopathy.    ABDOMINAL WALL: Within normal limits.  BONES: Degenerative changes of the spine. Bilateral femoral rods and   dynamic hip screws..    IMPRESSION: Distended gallbladder containing stones. Mild intrahepatic   bile duct dilatation with a common bile duct stent in place.      EXAM:  US GALLBLADDER                            PROCEDURE DATE:  07/21/2018          INTERPRETATION:  CLINICAL INFORMATION: Fever.    TECHNIQUE: Sonographic images of the abdomen was performed.     COMPARISON: Abdominal radiograph dated 3/5/2018    FINDINGS:     Evaluation limited due to overlying bowel gas.    GALLBLADDER: Cholelithiasis without wall thickening or pericholecystic   fluid. No sonographic Hawkins's sign.   CBD: Question CBD stent. Normal caliber, 8 mm.     IMPRESSION: Limited study.    Cholelithiasis without sonographic evidence of acute cholecystitis. If   there is further clinical concern, a HIDA scan is suggested for further   evaluation.    Question common bile duct stent.    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  Approptiate use of antibiotics and adverse effects reviewed.      I have discussed the above plan of care with patient/family in detail. They expressed understanding of the treatment plan . Risks, benefits and alternatives discussed in detail. I have asked if they have any questions or concerns and appropriately addressed them to the best of my ability .      > 45 minutes spent in direct patient care reviewing  the notes, lab data/ imaging , discussion with multidisciplinary team. All questions were addressed and answered to the best of my capacity .    Thank you for allowing me to participate in the care of your patient .      Cynthia Jaimes MD  Infectious Disease  [phone]

## 2018-07-21 NOTE — PROGRESS NOTE ADULT - PROBLEM SELECTOR PLAN 1
- vs asymptomatic bacteriuria, currently working up  - pt w/ T 102 and  and found to have WBC 11.52 with 80.1% PMNs, and UA with small leukocyte esterase, 6-10 WBCs, moderate bacteria as above  -Continue cftx for now empirically until cultures result, trend pct

## 2018-07-21 NOTE — H&P ADULT - PROBLEM SELECTOR PLAN 1
- pt w/ T 102 and  and found to have WBC 11.52 with 80.1% PMNs, and UA with small leukocyte esterase, 6-10 WBCs, moderate bacteria consistent with UTI with sepsis   - fluids 30 cc/kg given, cultures sent before antibiotics(urine and blood), lactate sent, CBC with diff revealed WBC 11.52 and 80.1% PMNs, lactate 0.7, given rocephin 500 mg once   - TMP--800 BID x 3 days - pt w/ T 102 and  and found to have WBC 11.52 with 80.1% PMNs, and UA with small leukocyte esterase, 6-10 WBCs, moderate bacteria consistent with UTI with sepsis   - fluids 30 cc/kg given, cultures sent before antibiotics(urine and blood), lactate sent, CBC with diff revealed WBC 11.52 and 80.1% PMNs, lactate 0.7, given rocephin 500 mg once   Continue

## 2018-07-21 NOTE — H&P ADULT - NSHPLABSRESULTS_GEN_ALL_CORE
8.6    8.28  )-----------( 231      ( 2018 04:49 )             27.4     2018 04:49    146    |  114    |  30     ----------------------------<  88     4.1     |  24     |  1.10     Ca    7.6        2018 04:49    TPro  6.9    /  Alb  3.1    /  TBili  0.7    /  DBili  x      /  AST  59     /  ALT  106    /  AlkPhos  326    2018 21:18    LIVER FUNCTIONS - ( 2018 21:18 )  Alb: 3.1 g/dL / Pro: 6.9 g/dL / ALK PHOS: 326 U/L / ALT: 106 U/L / AST: 59 U/L / GGT: x           PT/INR - ( 2018 04:49 )   PT: 36.5 sec;   INR: 3.27 ratio           CAPILLARY BLOOD GLUCOSE            Urinalysis Basic - ( 2018 21:18 )    Color: Yellow / Appearance: Clear / S.005 / pH: x  Gluc: x / Ketone: Negative  / Bili: Negative / Urobili: Negative   Blood: x / Protein: 75 mg/dL / Nitrite: Negative   Leuk Esterase: Small / RBC: 3-5 /HPF / WBC 6-10   Sq Epi: x / Non Sq Epi: Few / Bacteria: Moderate      CT  LIVER: Within normal limits.  BILE DUCTS: Pneumobilia. Mild intrahepatic bile duct dilatation. Common   bile duct stent is seen with distal tip in the duodenal lumen..  GALLBLADDER: Distended gallbladder containing stones.  SPLEEN: Within normal limits.  PANCREAS: Within normal limits.  ADRENALS: 1.4 cm indeterminate right lateral.  KIDNEYS/URETERS: Mild bilateral renal atrophy. Right renal cyst.   Prominent bilateral extrarenal pelves .    BLADDER: Within normal limits.  REPRODUCTIVE ORGANS: Status post hysterectomy.     BOWEL: No bowel obstruction. Appendix normal. Right lower quadrant small   bowel anastomosis seen. Diverticulosis of the descending and sigmoid   colon.  PERITONEUM: No ascites.  VESSELS:  Stable infrarenal abdominal aortic aneurysm measures 3.7 x 2.7   cm. Redemonstration of 3 occluded bypass graft extending from the right   external iliac artery down the leg.  RETROPERITONEUM: No lymphadenopathy.    ABDOMINAL WALL: Within normal limits.  BONES: Degenerative changes of the spine. Bilateral femoral rods and   dynamic hip screws..    IMPRESSION: Distended gallbladder containing stones. Mild intrahepatic   bile duct dilatation with a common bile duct stent in place.

## 2018-07-21 NOTE — H&P ADULT - NSHPSOCIALHISTORY_GEN_ALL_CORE
Lives at home, ambulates, does all ADLs, two daughters and son close by to help if needed    Prior smoker, patient couldn't quantify packs/day x years but said it was substantial. Patient quit 20 years ago     Occasional alcohol

## 2018-07-22 LAB
ALBUMIN SERPL ELPH-MCNC: 2.4 G/DL — LOW (ref 3.3–5)
ALP SERPL-CCNC: 249 U/L — HIGH (ref 40–120)
ALT FLD-CCNC: 64 U/L — SIGNIFICANT CHANGE UP (ref 12–78)
ANION GAP SERPL CALC-SCNC: 8 MMOL/L — SIGNIFICANT CHANGE UP (ref 5–17)
AST SERPL-CCNC: 27 U/L — SIGNIFICANT CHANGE UP (ref 15–37)
BASOPHILS # BLD AUTO: 0.02 K/UL — SIGNIFICANT CHANGE UP (ref 0–0.2)
BASOPHILS NFR BLD AUTO: 0.2 % — SIGNIFICANT CHANGE UP (ref 0–2)
BILIRUB SERPL-MCNC: 0.6 MG/DL — SIGNIFICANT CHANGE UP (ref 0.2–1.2)
BUN SERPL-MCNC: 22 MG/DL — SIGNIFICANT CHANGE UP (ref 7–23)
CALCIUM SERPL-MCNC: 8.2 MG/DL — LOW (ref 8.5–10.1)
CHLORIDE SERPL-SCNC: 114 MMOL/L — HIGH (ref 96–108)
CO2 SERPL-SCNC: 26 MMOL/L — SIGNIFICANT CHANGE UP (ref 22–31)
CREAT SERPL-MCNC: 1.1 MG/DL — SIGNIFICANT CHANGE UP (ref 0.5–1.3)
E COLI DNA BLD POS QL NAA+NON-PROBE: SIGNIFICANT CHANGE UP
EOSINOPHIL # BLD AUTO: 0.37 K/UL — SIGNIFICANT CHANGE UP (ref 0–0.5)
EOSINOPHIL NFR BLD AUTO: 4.4 % — SIGNIFICANT CHANGE UP (ref 0–6)
GLUCOSE SERPL-MCNC: 90 MG/DL — SIGNIFICANT CHANGE UP (ref 70–99)
GRAM STN FLD: SIGNIFICANT CHANGE UP
HCT VFR BLD CALC: 29.9 % — LOW (ref 34.5–45)
HGB BLD-MCNC: 9.3 G/DL — LOW (ref 11.5–15.5)
IMM GRANULOCYTES NFR BLD AUTO: 0.4 % — SIGNIFICANT CHANGE UP (ref 0–1.5)
INR BLD: 2.37 RATIO — HIGH (ref 0.88–1.16)
LYMPHOCYTES # BLD AUTO: 1.06 K/UL — SIGNIFICANT CHANGE UP (ref 1–3.3)
LYMPHOCYTES # BLD AUTO: 12.5 % — LOW (ref 13–44)
MCHC RBC-ENTMCNC: 25.7 PG — LOW (ref 27–34)
MCHC RBC-ENTMCNC: 31.1 GM/DL — LOW (ref 32–36)
MCV RBC AUTO: 82.6 FL — SIGNIFICANT CHANGE UP (ref 80–100)
METHOD TYPE: SIGNIFICANT CHANGE UP
MONOCYTES # BLD AUTO: 0.63 K/UL — SIGNIFICANT CHANGE UP (ref 0–0.9)
MONOCYTES NFR BLD AUTO: 7.4 % — SIGNIFICANT CHANGE UP (ref 2–14)
NEUTROPHILS # BLD AUTO: 6.37 K/UL — SIGNIFICANT CHANGE UP (ref 1.8–7.4)
NEUTROPHILS NFR BLD AUTO: 75.1 % — SIGNIFICANT CHANGE UP (ref 43–77)
PLATELET # BLD AUTO: 282 K/UL — SIGNIFICANT CHANGE UP (ref 150–400)
POTASSIUM SERPL-MCNC: 4 MMOL/L — SIGNIFICANT CHANGE UP (ref 3.5–5.3)
POTASSIUM SERPL-SCNC: 4 MMOL/L — SIGNIFICANT CHANGE UP (ref 3.5–5.3)
PROCALCITONIN SERPL-MCNC: 0.41 NG/ML — HIGH (ref 0–0.04)
PROT SERPL-MCNC: 5.9 G/DL — LOW (ref 6–8.3)
PROTHROM AB SERPL-ACNC: 26.3 SEC — HIGH (ref 9.8–12.7)
RBC # BLD: 3.62 M/UL — LOW (ref 3.8–5.2)
RBC # FLD: 17.9 % — HIGH (ref 10.3–14.5)
SODIUM SERPL-SCNC: 148 MMOL/L — HIGH (ref 135–145)
SPECIMEN SOURCE: SIGNIFICANT CHANGE UP
T3 SERPL-MCNC: 69 NG/DL — LOW (ref 80–200)
T4 AB SER-ACNC: 6.1 UG/DL — SIGNIFICANT CHANGE UP (ref 4.6–12)
TSH SERPL-MCNC: 2.1 UIU/ML — SIGNIFICANT CHANGE UP (ref 0.36–3.74)
WBC # BLD: 8.48 K/UL — SIGNIFICANT CHANGE UP (ref 3.8–10.5)
WBC # FLD AUTO: 8.48 K/UL — SIGNIFICANT CHANGE UP (ref 3.8–10.5)

## 2018-07-22 PROCEDURE — 99233 SBSQ HOSP IP/OBS HIGH 50: CPT

## 2018-07-22 RX ORDER — WARFARIN SODIUM 2.5 MG/1
3 TABLET ORAL ONCE
Qty: 0 | Refills: 0 | Status: COMPLETED | OUTPATIENT
Start: 2018-07-22 | End: 2018-07-22

## 2018-07-22 RX ORDER — SENNA PLUS 8.6 MG/1
2 TABLET ORAL AT BEDTIME
Qty: 0 | Refills: 0 | Status: DISCONTINUED | OUTPATIENT
Start: 2018-07-22 | End: 2018-07-26

## 2018-07-22 RX ORDER — WARFARIN SODIUM 2.5 MG/1
5 TABLET ORAL ONCE
Qty: 0 | Refills: 0 | Status: DISCONTINUED | OUTPATIENT
Start: 2018-07-22 | End: 2018-07-22

## 2018-07-22 RX ADMIN — Medication 100 MILLIGRAM(S): at 06:04

## 2018-07-22 RX ADMIN — GABAPENTIN 100 MILLIGRAM(S): 400 CAPSULE ORAL at 22:04

## 2018-07-22 RX ADMIN — Medication 81 MILLIGRAM(S): at 13:32

## 2018-07-22 RX ADMIN — Medication 25 MILLIGRAM(S): at 13:33

## 2018-07-22 RX ADMIN — Medication 50 MILLIGRAM(S): at 06:03

## 2018-07-22 RX ADMIN — WARFARIN SODIUM 3 MILLIGRAM(S): 2.5 TABLET ORAL at 22:02

## 2018-07-22 RX ADMIN — ATORVASTATIN CALCIUM 40 MILLIGRAM(S): 80 TABLET, FILM COATED ORAL at 22:03

## 2018-07-22 RX ADMIN — Medication 25 MILLIGRAM(S): at 18:19

## 2018-07-22 RX ADMIN — Medication 25 MILLIGRAM(S): at 00:52

## 2018-07-22 RX ADMIN — MIRTAZAPINE 7.5 MILLIGRAM(S): 45 TABLET, ORALLY DISINTEGRATING ORAL at 22:02

## 2018-07-22 RX ADMIN — Medication 50 MILLIGRAM(S): at 22:02

## 2018-07-22 RX ADMIN — Medication 25 MILLIGRAM(S): at 06:04

## 2018-07-22 RX ADMIN — GABAPENTIN 100 MILLIGRAM(S): 400 CAPSULE ORAL at 13:32

## 2018-07-22 RX ADMIN — Medication 50 MILLIGRAM(S): at 13:32

## 2018-07-22 RX ADMIN — GABAPENTIN 100 MILLIGRAM(S): 400 CAPSULE ORAL at 06:04

## 2018-07-22 RX ADMIN — CEFTRIAXONE 100 GRAM(S): 500 INJECTION, POWDER, FOR SOLUTION INTRAMUSCULAR; INTRAVENOUS at 22:02

## 2018-07-22 RX ADMIN — PANTOPRAZOLE SODIUM 40 MILLIGRAM(S): 20 TABLET, DELAYED RELEASE ORAL at 06:04

## 2018-07-22 NOTE — DIETITIAN INITIAL EVALUATION ADULT. - PROBLEM SELECTOR PLAN 2
- patient took coumadin on presentation 7/20  supratherapeutic today, iold dose today     - metoprolol tartrate 25 mg PO daily  - diltiazem 30 mg PO daily

## 2018-07-22 NOTE — DIETITIAN INITIAL EVALUATION ADULT. - OTHER INFO
Pt seen for nutrition consult for enteral or parental nutrition PTA. Pt was not on enteral or parental nutrition PTA. As per chart 82 yo female w/ PMH of afib on coumadin, CVA and hx of aneurysm repair, HTN, HLD, mitral valve prolapse, PVD s/p stents x 3 (RLE), rheumatoid arthritis, who presents with one week of subjective fever, diaphoresis chills, weakness, and lethargy. Pt found to have UTI with sepsis. Pt reports currently good appetite and PO intake. Pt's current weight per chart 115.15lbs. Per previous RD note pt's weight was 108lbs (2/5/18). Pt reports that she intentionally tried to gain weight and is up to 116lbs. Pt denies any chewing/swallowing difficulties, denies any GI distress, +BM yesterday. Pt with no noted edema or pressure injuries at this time.

## 2018-07-22 NOTE — PROGRESS NOTE ADULT - SUBJECTIVE AND OBJECTIVE BOX
CARITO CONSTANTINO is a 83yFemale , patient examined and chart reviewed.     INTERVAL HPI/ OVERNIGHT EVENTS:   Blood cultures with GNR.   Afebrile.    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  Osteoarthritis  Afib  PVD (peripheral vascular disease)  Neuropathy: (Right lower leg)  H/O cerebral aneurysm repair: brain clips  CVA (cerebral vascular accident): (&quot;Mini-stroke&quot;,1990&#x27;s)  Rheumatoid arthritis  Hyperlipidemia  Hypertension  S/P cataract surgery: (Left eye)  Elective surgery: (&quot;Twisted bowel&quot;, 2014)  Elective surgery: (Exision of cyst on liver, 1985)  S/P ORIF (open reduction internal fixation) fracture: Left hip fx (2012) &amp; R hip fx (2013)  H/O cerebral aneurysm repair: Brain clips (1978(  Renal stone: Cysto stent placement 10/1/2014  PVD (peripheral vascular disease): s/p RLE bypass x 3, most recent 3/2012 Right external iliac to PT bypass w/ PTFE (2012)  S/P FRED (total abdominal hysterectomy): (1987, Hx of &quot;ovarian cancer?&quot;)      For details regarding the patient's social history, family history, and other miscellaneous elements, please refer the initial infectious diseases consultation and/or the admitting history and physical examination for this admission.    ROS:  CONSTITUTIONAL:  Negative fever or chills  EYES:  Negative  blurry vision or double vision  CARDIOVASCULAR:  Negative for chest pain or palpitations  RESPIRATORY:  Negative for cough, wheezing, or SOB   GASTROINTESTINAL:  Negative for nausea, vomiting, diarrhea, constipation, or abdominal pain  GENITOURINARY:  Negative frequency, urgency or dysuria  NEUROLOGIC:  No headache, confusion, dizziness, lightheadedness  All other systems were reviewed and are negative       Current inpatient medications :    ANTIBIOTICS/RELEVANT:  cefTRIAXone   IVPB 1 Gram(s) IV Intermittent every 24 hours      acetaminophen   Tablet 650 milliGRAM(s) Oral every 8 hours PRN  aspirin enteric coated 81 milliGRAM(s) Oral daily  atorvastatin 40 milliGRAM(s) Oral at bedtime  diltiazem    Tablet 30 milliGRAM(s) Oral every 6 hours  docusate sodium 100 milliGRAM(s) Oral two times a day PRN  gabapentin 100 milliGRAM(s) Oral three times a day  hydrALAZINE 50 milliGRAM(s) Oral three times a day  metoprolol tartrate 25 milliGRAM(s) Oral every 6 hours  mirtazapine 7.5 milliGRAM(s) Oral at bedtime  pantoprazole    Tablet 40 milliGRAM(s) Oral before breakfast  polyethylene glycol 3350 17 Gram(s) Oral daily PRN  senna 2 Tablet(s) Oral at bedtime PRN      Objective:    T(C): 36.6 (07-22-18 @ 20:22), Max: 36.7 (07-22-18 @ 14:02)  HR: 82 (07-22-18 @ 20:22) (80 - 84)  BP: 152/81 (07-22-18 @ 20:22) (130/78 - 170/90)  RR: 17 (07-22-18 @ 20:22) (17 - 18)  SpO2: 95% (07-22-18 @ 20:22) (95% - 97%)  Wt(kg): --      Physical Exam:  General, in no acute distress  Eyes: sclera anicteric, pupils equal and reactive to light  ENMT: buccal mucosa moist, pharynx not injected  Neck: supple, trachea midline  Lungs: clear, no wheeze/rhonchi  Cardiovascular: regular rate and rhythm, S1 S2  Abdomen: soft, nontender, no organomegaly present, bowel sounds normal  Neurological: alert and oriented x3, Cranial Nerves II-XII grossly intact  Skin: no increased ecchymosis/petechiae/purpura  Lymph Nodes: no palpable cervical/supraclavicular lymph nodes enlargements  Extremities: no cyanosis/clubbing/edema      LABS:                          9.3    8.48  )-----------( 282      ( 22 Jul 2018 07:35 )             29.9       07-22    148<H>  |  114<H>  |  22  ----------------------------<  90  4.0   |  26  |  1.10    Ca    8.2<L>      22 Jul 2018 07:35    TPro  5.9<L>  /  Alb  2.4<L>  /  TBili  0.6  /  DBili  x   /  AST  27  /  ALT  64  /  AlkPhos  249<H>  07-22      PT/INR - ( 22 Jul 2018 07:35 )   PT: 26.3 sec;   INR: 2.37 ratio      MICROBIOLOGY:    Culture - Blood (collected 21 Jul 2018 10:37)  Source: .Blood Blood-Peripheral  Gram Stain (22 Jul 2018 01:34):    Growth in anaerobic bottle: Gram Negative Rods  Preliminary Report (22 Jul 2018 18:38):    Growth in anaerobic bottle: Escherichia coli    "Due to technical problems, Proteus sp. will Not be reported as part of    the BCID panel until further notice"    ***Blood Panel PCR results on this specimen are available    approximately 3 hours after the Gram stain result.***    Gram stain, PCR, and/or culture results may not always    correspond due to difference in methodologies.    ************************************************************    This PCR assay was performed using [employer].    The following targets are tested for: Enterococcus,    vancomycin resistant enterococci, Listeria monocytogenes,    coagulase negative staphylococci, S. aureus,    methicillin resistant S. aureus, Streptococcus agalactiae    (Group B), S. pneumoniae, S. pyogenes (Group A),    Acinetobacter baumannii, Enterobacter cloacae, E. coli,    Klebsiella oxytoca, K. pneumoniae, Proteus sp.,    Serratia marcescens, Haemophilus influenzae,    Neisseria meningitidis, Pseudomonas aeruginosa, Candida    albicans, C. glabrata, C krusei, C parapsilosis,    C. tropicalis and the KPC resistance gene.  Organism: Blood Culture PCR (22 Jul 2018 03:32)  Organism: Blood Culture PCR (22 Jul 2018 03:32)      -  Escherichia coli: Detec      Method Type: PCR    Culture - Blood (collected 21 Jul 2018 10:33)  Source: .Blood Blood-Peripheral  Preliminary Report (22 Jul 2018 11:01):    No growth to date.    Culture - Urine (collected 21 Jul 2018 10:03)  Source: .Urine Clean Catch (Midstream)  Preliminary Report (22 Jul 2018 14:07):    >100,000 CFU/ml Escherichia coli    >100,000 CFU/ml Klebsiella pneumoniae                    RADIOLOGY & ADDITIONAL STUDIES:  EXAM:  CT ABDOMEN AND PELVIS IC                            PROCEDURE DATE:  07/21/2018          INTERPRETATION:  CLINICAL INFORMATION: Fever and diarrhea    COMPARISON: CT abdomen and pelvis 10/31/2017    PROCEDURE:   CT of the Abdomen and Pelvis was performed with intravenous contrast.   Intravenous contrast: 96 ml Omnipaque 350. 4 ml discarded.  Oral contrast: positive contrast was administered.  Sagittal and coronal reformats were performed.    FINDINGS:    LOWER CHEST: Within normal limits.    LIVER: Within normal limits.  BILE DUCTS: Pneumobilia. Mild intrahepatic bile duct dilatation. Common   bile duct stent is seen with distal tip in the duodenal lumen..  GALLBLADDER: Distended gallbladder containing stones.  SPLEEN: Within normal limits.  PANCREAS: Within normal limits.  ADRENALS: 1.4 cm indeterminate right lateral.  KIDNEYS/URETERS: Mild bilateral renal atrophy. Right renal cyst.   Prominent bilateral extrarenal pelves .    BLADDER: Within normal limits.  REPRODUCTIVE ORGANS: Status post hysterectomy.     BOWEL: No bowel obstruction. Appendix normal. Right lower quadrant small   bowel anastomosis seen. Diverticulosis of the descending and sigmoid   colon.  PERITONEUM: No ascites.  VESSELS:  Stable infrarenal abdominal aortic aneurysm measures 3.7 x 2.7   cm. Redemonstration of 3 occluded bypass graft extending from the right   external iliac artery down the leg.  RETROPERITONEUM: No lymphadenopathy.    ABDOMINAL WALL: Within normal limits.  BONES: Degenerative changes of the spine. Bilateral femoral rods and   dynamic hip screws..    IMPRESSION: Distended gallbladder containing stones. Mild intrahepatic   bile duct dilatation with a common bile duct stent in place.      EXAM:  US GALLBLADDER                            PROCEDURE DATE:  07/21/2018          INTERPRETATION:  CLINICAL INFORMATION: Fever.    TECHNIQUE: Sonographic images of the abdomen was performed.     COMPARISON: Abdominal radiograph dated 3/5/2018    FINDINGS:     Evaluation limited due to overlying bowel gas.    GALLBLADDER: Cholelithiasis without wall thickening or pericholecystic   fluid. No sonographic Hawkins's sign.   CBD: Question CBD stent. Normal caliber, 8 mm.     IMPRESSION: Limited study.    Cholelithiasis without sonographic evidence of acute cholecystitis. If   there is further clinical concern, a HIDA scan is suggested for further   evaluation.    Question common bile duct stent.    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  Appropriate use of antibiotics and adverse effects reviewed.      I have discussed the above plan of care with patient/ family in detail. They expressed understanding of the  treatment plan . Risks, benefits and alternatives discussed in detail. I have asked if they have any questions or concerns and appropriately addressed them to the best of my ability .    > 35 minutes were spent in direct patient care reviewing notes, medications ,labs data/ imaging , discussion with multidisciplinary team.    Thank you for allowing me to participate in care of your patient .    Cynthia Jaimes MD  Infectious Disease  [phone]

## 2018-07-22 NOTE — DIETITIAN INITIAL EVALUATION ADULT. - PROBLEM SELECTOR PLAN 1
- pt w/ T 102 and  and found to have WBC 11.52 with 80.1% PMNs, and UA with small leukocyte esterase, 6-10 WBCs, moderate bacteria consistent with UTI with sepsis   - fluids 30 cc/kg given, cultures sent before antibiotics(urine and blood), lactate sent, CBC with diff revealed WBC 11.52 and 80.1% PMNs, lactate 0.7, given rocephin 500 mg once   Continue

## 2018-07-22 NOTE — PROGRESS NOTE ADULT - ASSESSMENT
82 yo female w/ PMH of afib on coumadin, CVA and hx of aneurysm, HTN, HLD, mitral valve prolapse, PVD s/p stents x 3 (RLE), rheumatoid arthritis, who presents with one week of fever (measured at home, pt doesn't remember temp), diaphoresis chills, weakness, and lethargy, found with T 102 and  and found to have WBC 11.52 with 80.1% PMNs, and UA with small leukocyte esterase, 6-10 WBCs, moderate bacteria consistent with sepsis sec to  culture + GNR in blood and ecoli/klebsiella in urine

## 2018-07-22 NOTE — DIETITIAN INITIAL EVALUATION ADULT. - PROBLEM SELECTOR PLAN 7
small size, asymptomatic  stable compared to CT on 10/30/2017  consider vascular consult vs outpatient f/u and repeat CT  cont statin and BB

## 2018-07-22 NOTE — PHYSICAL THERAPY INITIAL EVALUATION ADULT - CRITERIA FOR SKILLED THERAPEUTIC INTERVENTIONS
predicted duration of therapy intervention/risk reduction/prevention/rehab potential/therapy frequency/impairments found/functional limitations in following categories/anticipated discharge recommendation

## 2018-07-22 NOTE — PROGRESS NOTE ADULT - PROBLEM SELECTOR PLAN 4
improving  ct abd with cholethiasis, ?acute cholcytitis, will poss need hida  apprec GI recs  consider gen surg recs prn

## 2018-07-22 NOTE — PROGRESS NOTE ADULT - ATTENDING COMMENTS
11.) low T3-poss cause of weakness in setting of infection vs hypothyroid, apprec endocrine recs    12. weakness- may be sec to vasculopathy acute on chronic, vs thyroid vs acute infection, apprec PT destinee hale plan of care

## 2018-07-22 NOTE — PHYSICAL THERAPY INITIAL EVALUATION ADULT - PERTINENT HX OF CURRENT PROBLEM, REHAB EVAL
84 yo female w/ PMH of afib on coumadin, CVA and hx of aneurysm, HTN, HLD, mitral valve prolapse, PVD s/p stents x 3 (RLE), rheumatoid arthritis, who presents with one week of fever (measured at home, pt doesn't remember temp), diaphoresis chills, weakness, and lethargy,

## 2018-07-22 NOTE — PROGRESS NOTE ADULT - PROBLEM SELECTOR PLAN 1
- complicate, recurrent; urosepsis, + urine culture  - pt w/ T 102 and  and found to have WBC 11.52 with 80.1% PMNs, and UA with small leukocyte esterase, 6-10 WBCs, moderate bacteria as above  -Continue cftx  -PCT 0.81 to 0.41 improving  - apprec ID further recs

## 2018-07-22 NOTE — PHYSICAL THERAPY INITIAL EVALUATION ADULT - ADDITIONAL COMMENTS
Patient lives at home alone with private aide.  Patient has a rolling walker and rollator.  Patient has no stairs to negotiate.  Patient has a tub with bench and rails.

## 2018-07-22 NOTE — DIETITIAN INITIAL EVALUATION ADULT. - NS AS NUTRI INTERV MEALS SNACK
Recommend to change diet from Consistent CHO renal to DASH/TLC as pt has no history of diabetes or kidney disease. Pt's food preferences obtained./General/healthful diet

## 2018-07-22 NOTE — PROGRESS NOTE ADULT - SUBJECTIVE AND OBJECTIVE BOX
Patient is a 83y old  Female who presents with a chief complaint of fever, x 1 week, weakness (2018 08:35)      INTERVAL HPI: Pt seen and examined. States she still feels very fatigued and tired, also her RLE has some weakness.     OVERNIGHT EVENTS:  T(F): 98.1 (18 @ 14:02), Max: 98.1 (18 @ 14:02)  HR: 84 (18 @ 14:02) (69 - 84)  BP: 160/94 (18 @ 14:02) (130/78 - 170/90)  RR: 18 (18 @ 14:02) (17 - 18)  SpO2: 97% (18 @ 14:02) (95% - 98%)  I&O's Summary      REVIEW OF SYSTEMS:  CONSTITUTIONAL: No fever, weight loss, or fatigue  EYES: No eye pain, visual disturbances, or discharge  ENMT:  No difficulty hearing, tinnitus, vertigo; No sinus or throat pain  NECK: No pain or stiffness  BREASTS: No pain, masses, or nipple discharge  RESPIRATORY: No cough, wheezing, chills or hemoptysis; No shortness of breath  CARDIOVASCULAR: No chest pain, palpitations, dizziness, or leg swelling  GASTROINTESTINAL: No abdominal or epigastric pain. No nausea, vomiting, or hematemesis; No diarrhea or constipation. No melena or hematochezia.  GENITOURINARY: No dysuria, frequency, hematuria, or incontinence  NEUROLOGICAL: No headaches, memory loss, loss of strength, numbness, or tremors  SKIN: No itching, burning, rashes, or lesions   LYMPH NODES: No enlarged glands  ENDOCRINE: No heat or cold intolerance; No hair loss  MUSCULOSKELETAL: No joint pain or swelling; No muscle, back, or extremity pain  PSYCHIATRIC: No depression, anxiety, mood swings, or difficulty sleeping  HEME/LYMPH: No easy bruising, or bleeding gums  ALLERY AND IMMUNOLOGIC: No hives or eczema    PHYSICAL EXAM:  GENERAL: NAD, well-groomed, well-developed  HEAD:  Atraumatic, Normocephalic  EYES: EOMI, PERRLA, conjunctiva and sclera clear  ENMT: No tonsillar erythema, exudates, or enlargement; Moist mucous membranes, Good dentition, No lesions  NECK: Supple, No JVD, Normal thyroid  NERVOUS SYSTEM:  Alert & Oriented X3, Good concentration; Motor Strength 5/5 B/L upper and lower extremities; DTRs 2+ intact and symmetric  CHEST/LUNG: Clear to percussion bilaterally; No rales, rhonchi, wheezing, or rubs  HEART: Regular rate and rhythm; No murmurs, rubs, or gallops  ABDOMEN: Soft, Nontender, Nondistended; Bowel sounds present  EXTREMITIES:  2+ Peripheral Pulses, No clubbing, cyanosis, or edema  LYMPH: No lymphadenopathy noted  SKIN: No rashes or lesions    LABS:                        9.3    8.48  )-----------( 282      ( 2018 07:35 )             29.9         148<H>  |  114<H>  |  22  ----------------------------<  90  4.0   |  26  |  1.10    Ca    8.2<L>      2018 07:35    TPro  5.9<L>  /  Alb  2.4<L>  /  TBili  0.6  /  DBili  x   /  AST  27  /  ALT  64  /  AlkPhos  249<H>      PT/INR - ( 2018 07:35 )   PT: 26.3 sec;   INR: 2.37 ratio           Urinalysis Basic - ( 2018 21:18 )    Color: Yellow / Appearance: Clear / S.005 / pH: x  Gluc: x / Ketone: Negative  / Bili: Negative / Urobili: Negative   Blood: x / Protein: 75 mg/dL / Nitrite: Negative   Leuk Esterase: Small / RBC: 3-5 /HPF / WBC 6-10   Sq Epi: x / Non Sq Epi: Few / Bacteria: Moderate      CAPILLARY BLOOD GLUCOSE          07-21 @ 10:37   Growth in anaerobic bottle: Gram Negative Rods  "Due to technical problems, Proteus sp. will Not be reported as part of  the BCID panel until further notice"  ***Blood Panel PCR results on this specimen are available  approximately 3 hours after the Gram stain result.***  Gram stain, PCR, and/or culture results may not always  correspond due to difference in methodologies.  ************************************************************  This PCR assay was performed using [employer].  The following targets are tested for: Enterococcus,  vancomycin resistant enterococci, Listeria monocytogenes,  coagulase negative staphylococci, S. aureus,  methicillin resistant S. aureus, Streptococcus agalactiae  (Group B), S. pneumoniae, S. pyogenes (Group A),  Acinetobacter baumannii, Enterobacter cloacae, E. coli,  Klebsiella oxytoca, K. pneumoniae, Proteus sp.,  Serratia marcescens, Haemophilus influenzae,  Neisseria meningitidis, Pseudomonas aeruginosa, Candida  albicans, C. glabrata, C krusei, C parapsilosis,  C. tropicalis and the KPC resistance gene.  --  Blood Culture PCR   @ 10:33   No growth to date.  --  --   @ 10:03   >100,000 CFU/ml Escherichia coli  >100,000 CFU/ml Klebsiella pneumoniae  --  --          MEDICATIONS  (STANDING):  aspirin enteric coated 81 milliGRAM(s) Oral daily  atorvastatin 40 milliGRAM(s) Oral at bedtime  cefTRIAXone   IVPB 1 Gram(s) IV Intermittent every 24 hours  diltiazem    Tablet 30 milliGRAM(s) Oral every 6 hours  gabapentin 100 milliGRAM(s) Oral three times a day  hydrALAZINE 50 milliGRAM(s) Oral three times a day  metoprolol tartrate 25 milliGRAM(s) Oral every 6 hours  mirtazapine 7.5 milliGRAM(s) Oral at bedtime  pantoprazole    Tablet 40 milliGRAM(s) Oral before breakfast  warfarin 3 milliGRAM(s) Oral once    MEDICATIONS  (PRN):  acetaminophen   Tablet 650 milliGRAM(s) Oral every 8 hours PRN Mild Pain (1 - 3)  docusate sodium 100 milliGRAM(s) Oral two times a day PRN Constipation  polyethylene glycol 3350 17 Gram(s) Oral daily PRN Constipation  senna 2 Tablet(s) Oral at bedtime PRN Constipation Patient is a 83y old  Female who presents with a chief complaint of fever, x 1 week, weakness (2018 08:35)      INTERVAL HPI: Pt seen and examined. States she still feels very fatigued and tired, also her RLE has some weakness.     OVERNIGHT EVENTS: none noted  T(F): 98.1 (18 @ 14:02), Max: 98.1 (18 @ 14:02)  HR: 84 (18 @ 14:02) (69 - 84)  BP: 160/94 (18 @ 14:02) (130/78 - 170/90)  RR: 18 (18 @ 14:02) (17 - 18)  SpO2: 97% (18 @ 14:02) (95% - 98%)  I&O's Summary      REVIEW OF SYSTEMS: 12 systems noncontributory other than that stated in hpi    PHYSICAL EXAM:  GENERAL: NAD,elder  HEAD:  Atraumatic, Normocephalic  EYES: EOMI, PERRLA, conjunctiva and sclera clear  ENMT: No tonsillar erythema, exudates, or enlargement; Moist mucous membranes, Good dentition, No lesions  NECK: Supple, No JVD, Normal thyroid  NERVOUS SYSTEM:  Alert & Oriented X3, Good concentration; Motor Strength 3/5 B/L upper and lower extremities  CHEST/LUNG: Clear to percussion bilaterally; No rales, rhonchi, wheezing, or rubs  HEART: Regular rate and rhythm; No murmurs, rubs, or gallops  ABDOMEN: Soft, Nontender, Nondistended; Bowel sounds present  EXTREMITIES:  2+ Peripheral Pulses, No clubbing, cyanosis, or edema; R foot missing 2nd digit from prev amputation  SKIN: No rashes or lesions    LABS:                        9.3    8.48  )-----------( 282      ( 2018 07:35 )             29.9     07-    148<H>  |  114<H>  |  22  ----------------------------<  90  4.0   |  26  |  1.10    Ca    8.2<L>      2018 07:35    TPro  5.9<L>  /  Alb  2.4<L>  /  TBili  0.6  /  DBili  x   /  AST  27  /  ALT  64  /  AlkPhos  249<H>      PT/INR - ( 2018 07:35 )   PT: 26.3 sec;   INR: 2.37 ratio           Urinalysis Basic - ( 2018 21:18 )    Color: Yellow / Appearance: Clear / S.005 / pH: x  Gluc: x / Ketone: Negative  / Bili: Negative / Urobili: Negative   Blood: x / Protein: 75 mg/dL / Nitrite: Negative   Leuk Esterase: Small / RBC: 3-5 /HPF / WBC 6-10   Sq Epi: x / Non Sq Epi: Few / Bacteria: Moderate      CAPILLARY BLOOD GLUCOSE           @ 10:37   Growth in anaerobic bottle: Gram Negative Rods  "Due to technical problems, Proteus sp. will Not be reported as part of  the BCID panel until further notice"  ***Blood Panel PCR results on this specimen are available  approximately 3 hours after the Gram stain result.***  Gram stain, PCR, and/or culture results may not always  correspond due to difference in methodologies.  ************************************************************  This PCR assay was performed using [employer].  The following targets are tested for: Enterococcus,  vancomycin resistant enterococci, Listeria monocytogenes,  coagulase negative staphylococci, S. aureus,  methicillin resistant S. aureus, Streptococcus agalactiae  (Group B), S. pneumoniae, S. pyogenes (Group A),  Acinetobacter baumannii, Enterobacter cloacae, E. coli,  Klebsiella oxytoca, K. pneumoniae, Proteus sp.,  Serratia marcescens, Haemophilus influenzae,  Neisseria meningitidis, Pseudomonas aeruginosa, Candida  albicans, C. glabrata, C krusei, C parapsilosis,  C. tropicalis and the KPC resistance gene.  --  Blood Culture PCR   @ 10:33   No growth to date.  --  --   @ 10:03   >100,000 CFU/ml Escherichia coli  >100,000 CFU/ml Klebsiella pneumoniae  --  --          MEDICATIONS  (STANDING):  aspirin enteric coated 81 milliGRAM(s) Oral daily  atorvastatin 40 milliGRAM(s) Oral at bedtime  cefTRIAXone   IVPB 1 Gram(s) IV Intermittent every 24 hours  diltiazem    Tablet 30 milliGRAM(s) Oral every 6 hours  gabapentin 100 milliGRAM(s) Oral three times a day  hydrALAZINE 50 milliGRAM(s) Oral three times a day  metoprolol tartrate 25 milliGRAM(s) Oral every 6 hours  mirtazapine 7.5 milliGRAM(s) Oral at bedtime  pantoprazole    Tablet 40 milliGRAM(s) Oral before breakfast  warfarin 3 milliGRAM(s) Oral once    MEDICATIONS  (PRN):  acetaminophen   Tablet 650 milliGRAM(s) Oral every 8 hours PRN Mild Pain (1 - 3)  docusate sodium 100 milliGRAM(s) Oral two times a day PRN Constipation  polyethylene glycol 3350 17 Gram(s) Oral daily PRN Constipation  senna 2 Tablet(s) Oral at bedtime PRN Constipation

## 2018-07-23 ENCOUNTER — TRANSCRIPTION ENCOUNTER (OUTPATIENT)
Age: 83
End: 2018-07-23

## 2018-07-23 DIAGNOSIS — R94.6 ABNORMAL RESULTS OF THYROID FUNCTION STUDIES: ICD-10-CM

## 2018-07-23 DIAGNOSIS — K81.9 CHOLECYSTITIS, UNSPECIFIED: ICD-10-CM

## 2018-07-23 LAB
-  AMIKACIN: SIGNIFICANT CHANGE UP
-  AMOXICILLIN/CLAVULANIC ACID: SIGNIFICANT CHANGE UP
-  AMOXICILLIN/CLAVULANIC ACID: SIGNIFICANT CHANGE UP
-  AMPICILLIN/SULBACTAM: SIGNIFICANT CHANGE UP
-  AMPICILLIN: SIGNIFICANT CHANGE UP
-  AZTREONAM: SIGNIFICANT CHANGE UP
-  CEFAZOLIN: SIGNIFICANT CHANGE UP
-  CEFEPIME: SIGNIFICANT CHANGE UP
-  CEFOXITIN: SIGNIFICANT CHANGE UP
-  CEFTRIAXONE: SIGNIFICANT CHANGE UP
-  CIPROFLOXACIN: SIGNIFICANT CHANGE UP
-  ERTAPENEM: SIGNIFICANT CHANGE UP
-  GENTAMICIN: SIGNIFICANT CHANGE UP
-  IMIPENEM: SIGNIFICANT CHANGE UP
-  LEVOFLOXACIN: SIGNIFICANT CHANGE UP
-  MEROPENEM: SIGNIFICANT CHANGE UP
-  NITROFURANTOIN: SIGNIFICANT CHANGE UP
-  NITROFURANTOIN: SIGNIFICANT CHANGE UP
-  PIPERACILLIN/TAZOBACTAM: SIGNIFICANT CHANGE UP
-  TIGECYCLINE: SIGNIFICANT CHANGE UP
-  TIGECYCLINE: SIGNIFICANT CHANGE UP
-  TOBRAMYCIN: SIGNIFICANT CHANGE UP
-  TRIMETHOPRIM/SULFAMETHOXAZOLE: SIGNIFICANT CHANGE UP
ALBUMIN SERPL ELPH-MCNC: 2.5 G/DL — LOW (ref 3.3–5)
ALP SERPL-CCNC: 257 U/L — HIGH (ref 40–120)
ALT FLD-CCNC: 55 U/L — SIGNIFICANT CHANGE UP (ref 12–78)
ANION GAP SERPL CALC-SCNC: 7 MMOL/L — SIGNIFICANT CHANGE UP (ref 5–17)
AST SERPL-CCNC: 25 U/L — SIGNIFICANT CHANGE UP (ref 15–37)
BILIRUB SERPL-MCNC: 0.4 MG/DL — SIGNIFICANT CHANGE UP (ref 0.2–1.2)
BUN SERPL-MCNC: 25 MG/DL — HIGH (ref 7–23)
CALCIUM SERPL-MCNC: 8.5 MG/DL — SIGNIFICANT CHANGE UP (ref 8.5–10.1)
CHLORIDE SERPL-SCNC: 112 MMOL/L — HIGH (ref 96–108)
CO2 SERPL-SCNC: 28 MMOL/L — SIGNIFICANT CHANGE UP (ref 22–31)
CREAT SERPL-MCNC: 1.2 MG/DL — SIGNIFICANT CHANGE UP (ref 0.5–1.3)
CULTURE RESULTS: SIGNIFICANT CHANGE UP
GLUCOSE SERPL-MCNC: 89 MG/DL — SIGNIFICANT CHANGE UP (ref 70–99)
HCT VFR BLD CALC: 30.1 % — LOW (ref 34.5–45)
HGB BLD-MCNC: 9.4 G/DL — LOW (ref 11.5–15.5)
INR BLD: 1.76 RATIO — HIGH (ref 0.88–1.16)
MCHC RBC-ENTMCNC: 26 PG — LOW (ref 27–34)
MCHC RBC-ENTMCNC: 31.2 GM/DL — LOW (ref 32–36)
MCV RBC AUTO: 83.1 FL — SIGNIFICANT CHANGE UP (ref 80–100)
METHOD TYPE: SIGNIFICANT CHANGE UP
NRBC # BLD: 0 /100 WBCS — SIGNIFICANT CHANGE UP (ref 0–0)
ORGANISM # SPEC MICROSCOPIC CNT: SIGNIFICANT CHANGE UP
PLATELET # BLD AUTO: 297 K/UL — SIGNIFICANT CHANGE UP (ref 150–400)
POTASSIUM SERPL-MCNC: 3.9 MMOL/L — SIGNIFICANT CHANGE UP (ref 3.5–5.3)
POTASSIUM SERPL-SCNC: 3.9 MMOL/L — SIGNIFICANT CHANGE UP (ref 3.5–5.3)
PROCALCITONIN SERPL-MCNC: 0.25 NG/ML — HIGH (ref 0–0.04)
PROT SERPL-MCNC: 6.2 G/DL — SIGNIFICANT CHANGE UP (ref 6–8.3)
PROTHROM AB SERPL-ACNC: 19.4 SEC — HIGH (ref 9.8–12.7)
RBC # BLD: 3.62 M/UL — LOW (ref 3.8–5.2)
RBC # FLD: 18.1 % — HIGH (ref 10.3–14.5)
SODIUM SERPL-SCNC: 147 MMOL/L — HIGH (ref 135–145)
SPECIMEN SOURCE: SIGNIFICANT CHANGE UP
SPECIMEN SOURCE: SIGNIFICANT CHANGE UP
WBC # BLD: 7.04 K/UL — SIGNIFICANT CHANGE UP (ref 3.8–10.5)
WBC # FLD AUTO: 7.04 K/UL — SIGNIFICANT CHANGE UP (ref 3.8–10.5)

## 2018-07-23 PROCEDURE — 99223 1ST HOSP IP/OBS HIGH 75: CPT

## 2018-07-23 PROCEDURE — 99233 SBSQ HOSP IP/OBS HIGH 50: CPT

## 2018-07-23 PROCEDURE — 78226 HEPATOBILIARY SYSTEM IMAGING: CPT | Mod: 26

## 2018-07-23 RX ORDER — ERTAPENEM SODIUM 1 G/1
500 INJECTION, POWDER, LYOPHILIZED, FOR SOLUTION INTRAMUSCULAR; INTRAVENOUS EVERY 24 HOURS
Qty: 0 | Refills: 0 | Status: COMPLETED | OUTPATIENT
Start: 2018-07-24 | End: 2018-08-01

## 2018-07-23 RX ORDER — ERTAPENEM SODIUM 1 G/1
INJECTION, POWDER, LYOPHILIZED, FOR SOLUTION INTRAMUSCULAR; INTRAVENOUS
Qty: 0 | Refills: 0 | Status: DISCONTINUED | OUTPATIENT
Start: 2018-07-23 | End: 2018-07-23

## 2018-07-23 RX ORDER — ERTAPENEM SODIUM 1 G/1
INJECTION, POWDER, LYOPHILIZED, FOR SOLUTION INTRAMUSCULAR; INTRAVENOUS
Qty: 0 | Refills: 0 | Status: COMPLETED | OUTPATIENT
Start: 2018-07-23 | End: 2018-08-01

## 2018-07-23 RX ORDER — SINCALIDE 5 UG/5ML
1 INJECTION, POWDER, LYOPHILIZED, FOR SOLUTION INTRAVENOUS ONCE
Qty: 0 | Refills: 0 | Status: COMPLETED | OUTPATIENT
Start: 2018-07-23 | End: 2018-07-23

## 2018-07-23 RX ORDER — WARFARIN SODIUM 2.5 MG/1
4 TABLET ORAL ONCE
Qty: 0 | Refills: 0 | Status: COMPLETED | OUTPATIENT
Start: 2018-07-23 | End: 2018-07-23

## 2018-07-23 RX ORDER — ERTAPENEM SODIUM 1 G/1
1000 INJECTION, POWDER, LYOPHILIZED, FOR SOLUTION INTRAMUSCULAR; INTRAVENOUS ONCE
Qty: 0 | Refills: 0 | Status: COMPLETED | OUTPATIENT
Start: 2018-07-23 | End: 2018-07-23

## 2018-07-23 RX ADMIN — Medication 50 MILLIGRAM(S): at 22:45

## 2018-07-23 RX ADMIN — ATORVASTATIN CALCIUM 40 MILLIGRAM(S): 80 TABLET, FILM COATED ORAL at 22:45

## 2018-07-23 RX ADMIN — GABAPENTIN 100 MILLIGRAM(S): 400 CAPSULE ORAL at 05:22

## 2018-07-23 RX ADMIN — GABAPENTIN 100 MILLIGRAM(S): 400 CAPSULE ORAL at 16:24

## 2018-07-23 RX ADMIN — ERTAPENEM SODIUM 100 MILLIGRAM(S): 1 INJECTION, POWDER, LYOPHILIZED, FOR SOLUTION INTRAMUSCULAR; INTRAVENOUS at 14:00

## 2018-07-23 RX ADMIN — Medication 25 MILLIGRAM(S): at 19:15

## 2018-07-23 RX ADMIN — PANTOPRAZOLE SODIUM 40 MILLIGRAM(S): 20 TABLET, DELAYED RELEASE ORAL at 05:22

## 2018-07-23 RX ADMIN — Medication 25 MILLIGRAM(S): at 11:53

## 2018-07-23 RX ADMIN — WARFARIN SODIUM 4 MILLIGRAM(S): 2.5 TABLET ORAL at 22:45

## 2018-07-23 RX ADMIN — GABAPENTIN 100 MILLIGRAM(S): 400 CAPSULE ORAL at 22:46

## 2018-07-23 RX ADMIN — Medication 50 MILLIGRAM(S): at 05:22

## 2018-07-23 RX ADMIN — Medication 25 MILLIGRAM(S): at 05:22

## 2018-07-23 RX ADMIN — Medication 25 MILLIGRAM(S): at 00:08

## 2018-07-23 RX ADMIN — MIRTAZAPINE 7.5 MILLIGRAM(S): 45 TABLET, ORALLY DISINTEGRATING ORAL at 22:45

## 2018-07-23 RX ADMIN — Medication 81 MILLIGRAM(S): at 11:53

## 2018-07-23 RX ADMIN — SINCALIDE 1 MICROGRAM(S): 5 INJECTION, POWDER, LYOPHILIZED, FOR SOLUTION INTRAVENOUS at 15:30

## 2018-07-23 RX ADMIN — Medication 50 MILLIGRAM(S): at 16:24

## 2018-07-23 NOTE — DISCHARGE NOTE ADULT - ADDITIONAL INSTRUCTIONS
Follow up with your PMD, cardiology and vascular surgeon within 1-2 weeks of discharge Follow up with your PMD, Infectious Disease, Cardiology and vascular surgeon within 1-2 weeks of discharge Follow up with your PMD, Infectious Disease, Cardiology and vascular surgeon within 1-2 weeks of discharge  INR check within next couple days, please call your doctor's office for appointment where you go for INR checks.

## 2018-07-23 NOTE — DISCHARGE NOTE ADULT - CARE PROVIDERS DIRECT ADDRESSES
,[email or web address],DirectAddress_Unknown

## 2018-07-23 NOTE — PROGRESS NOTE ADULT - ASSESSMENT
84 yo female w/ PMH of afib on coumadin, CVA and hx of aneurysm, HTN, HLD, mitral valve prolapse, PVD s/p stents x 3 (RLE), rheumatoid arthritis, who presents with one week of fever (measured at home, pt doesn't remember temp), diaphoresis chills, weakness, and lethargy, found with T 102 and  and found to have WBC 11.52 with 80.1% PMNs, and UA with small leukocyte esterase, 6-10 WBCs, moderate bacteria consistent with sepsis sec to  culture + GNR in blood and ecoli/klebsiella in urine

## 2018-07-23 NOTE — CONSULT NOTE ADULT - SUBJECTIVE AND OBJECTIVE BOX
Patient is a 83y old  Female who presents with a chief complaint of fever, x 1 week, weakness (21 Jul 2018 08:35)      Reason For Consult:     HPI:  82 yo female w/ PMH of afib on coumadin, CVA and hx of aneurysm repair, HTN, HLD, mitral valve prolapse, PVD s/p stents x 3 (RLE), rheumatoid arthritis, who presents with one week of subjective fever (measured at home, pt doesn't remember temp), diaphoresis chills, weakness, and lethargy.     The patient was in her usual state of health until one week prior to presentation when she started to feel subjective fevers, chills, and diaphoresis. She then measured her temperature on the day of admission was noted it was "high" though she couldn't remember the exact temperature. She endorsed nocturia and urinary incontinence, though she notes both the nocturia and urinary incontinence are chronic and the frequency remains unchanged from prior.  She denies dysuria, hematuria, urinary frequency, urinary hesitancy.     She denies cough, SOB, CRABTREE, chest pain, palpitations. Of note, she endorses "a few days" of diarrhea that resolved without intervention one week prior to presentation. She denies recent travel, sick contacts.     In the ED, vitals were T 102   /84 RR 19 and Sp02 95%. She received 3 L NS bolus and ceftriaxone. Labs notable for WBC 11.52 with 80.1% PMNs, Allk Phos 326, AST 59, , Hbg 9.8 (baseline), UA with small leukocyte esterase, 6-10 WBCs, moderate bacteria,  CT revealed distended gallbladder containing stones, mild intrahepatic bile duct dilatation with a common bile duct stent in place, US gallbladder revealed cholelithiasis without sonographic evidence of acute cholecystitis. 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  Osteoarthritis  Afib  PVD (peripheral vascular disease)  Neuropathy: (Right lower leg)  H/O cerebral aneurysm repair: brain clips  CVA (cerebral vascular accident): (&quot;Mini-stroke&quot;,1990&#x27;s)  Rheumatoid arthritis  Hyperlipidemia  Hypertension  S/P cataract surgery: (Left eye)  Elective surgery: (&quot;Twisted bowel&quot;, 2014)  Elective surgery: (Exision of cyst on liver, 1985)  S/P ORIF (open reduction internal fixation) fracture: Left hip fx (2012) &amp; R hip fx (2013)  H/O cerebral aneurysm repair: Brain clips (1978(  Renal stone: Cysto stent placement 10/1/2014  PVD (peripheral vascular disease): s/p RLE bypass x 3, most recent 3/2012 Right external iliac to PT bypass w/ PTFE (2012)  S/P FRED (total abdominal hysterectomy): (1987, Hx of &quot;ovarian cancer?&quot;)      FAMILY HISTORY:  No pertinent family history in first degree relatives        Social History:    MEDICATIONS  (STANDING):  aspirin enteric coated 81 milliGRAM(s) Oral daily  atorvastatin 40 milliGRAM(s) Oral at bedtime  cefTRIAXone   IVPB 1 Gram(s) IV Intermittent every 24 hours  diltiazem    Tablet 30 milliGRAM(s) Oral every 6 hours  gabapentin 100 milliGRAM(s) Oral three times a day  hydrALAZINE 50 milliGRAM(s) Oral three times a day  metoprolol tartrate 25 milliGRAM(s) Oral every 6 hours  mirtazapine 7.5 milliGRAM(s) Oral at bedtime  pantoprazole    Tablet 40 milliGRAM(s) Oral before breakfast    MEDICATIONS  (PRN):  acetaminophen   Tablet 650 milliGRAM(s) Oral every 8 hours PRN Mild Pain (1 - 3)  docusate sodium 100 milliGRAM(s) Oral two times a day PRN Constipation  polyethylene glycol 3350 17 Gram(s) Oral daily PRN Constipation  senna 2 Tablet(s) Oral at bedtime PRN Constipation        T(C): 36.4 (07-23-18 @ 04:53), Max: 36.7 (07-22-18 @ 14:02)  HR: 70 (07-23-18 @ 04:53) (70 - 84)  BP: 169/83 (07-23-18 @ 04:53) (130/78 - 169/83)  RR: 16 (07-23-18 @ 04:53) (16 - 18)  SpO2: 96% (07-23-18 @ 04:53) (95% - 97%)  Wt(kg): --    PHYSICAL EXAM:  GENERAL: NAD, well-groomed, well-developed  HEAD:  Atraumatic, Normocephalic  NECK: Supple, No JVD, Normal thyroid  CHEST/LUNG: Clear to percussion bilaterally; No rales, rhonchi, wheezing, or rubs  HEART: Regular rate and rhythm; No murmurs, rubs, or gallops  ABDOMEN: Soft, Nontender, Nondistended; Bowel sounds present  EXTREMITIES:  2+ Peripheral Pulses, No clubbing, cyanosis, or edema  SKIN: No rashes or lesions    CAPILLARY BLOOD GLUCOSE                                9.4    7.04  )-----------( 297      ( 23 Jul 2018 07:19 )             30.1       CMP:  07-23 @ 07:19  SGPT 55  Albumin 2.5   Alk Phos 257   Anion Gap 7   SGOT 25   Total Bili 0.4   BUN 25   Calcium Total 8.5   CO2 28   Chloride 112   Creatinine 1.20   eGFR if AA 48   eGFR if non AA 42   Glucose 89   Potassium 3.9   Protein 6.2   Sodium 147      Thyroid Function Tests:  07-22 @ 10:09 TSH -- FreeT4 -- T3 69 Anti TPO -- Anti Thyroglobulin Ab -- TSI --  07-22 @ 07:35 TSH 2.10 FreeT4 -- T3 -- Anti TPO -- Anti Thyroglobulin Ab -- TSI --      Diabetes Tests:       Radiology:

## 2018-07-23 NOTE — DISCHARGE NOTE ADULT - PATIENT PORTAL LINK FT
You can access the [facility] Portal, offered by [facility], by registering with the following website: [email or web address]

## 2018-07-23 NOTE — DISCHARGE NOTE ADULT - PROVIDER TOKENS
TOKEN:'[id number]',TOKEN:'[id number]' TOKEN:'[id number]',TOKEN:'[id number]',TOKEN:'[id number]' TOKEN:'[id number]',TOKEN:'[id number]',TOKEN:'[id number]',TOKEN:'[id number]'

## 2018-07-23 NOTE — PROGRESS NOTE ADULT - PROBLEM SELECTOR PLAN 4
improving  ct abd with cholethiasis, ?acute cholcytitis,  HIDA scan today  apprec GI recs  consider gen surg recs pending hida results

## 2018-07-23 NOTE — DISCHARGE NOTE ADULT - PLAN OF CARE
stable -take all medications as prescribed  -follow up with your cardiologist within 1 week of discharge -cont all medications  -see your vascular physician within 1-2 weeks of discharge resolutioon -complete all medications as prescribed  -follow up with your primary care provider within 1 week of discharge resolution - Resolution upon completion of 10 day course of antibiotics.   - follow up with your primary care provider and the infectious disease specialist within 1 week of discharge - continue to take diltiazem, metoprolol and coumadin as prescribed  - In hospital INR was subtherapeutic, please continue taking your Coumadin. INR must be re-checked outpatient with your Primary Care Doctor. -continue taking aspirin   -see your vascular physician within 1-2 weeks of discharge controlled - continue taking hydralazine and metoprolol as prescribed - follow up with your PMD to monitor cbc - continue to take diltiazem, metoprolol and coumadin as prescribed  - In hospital INR was subtherapeutic, please continue taking your Coumadin. INR must be re-checked outpatient with your doctor. Per out Cardiologist Dr. Moya , does not need to be bridged and can continue home dose coumadin. Spoke to daughter Dixie and advised to f/u with Cardio within the next couple days to get INR checked and in case needs to adjust coumadin dose. -Suspect sepsis secondary to UTI likely POA.  - Resolution upon completion of 10 day course of antibiotics.   - follow up with your primary care provider and the infectious disease specialist within 1 week of discharge

## 2018-07-23 NOTE — PROGRESS NOTE ADULT - PROBLEM SELECTOR PLAN 1
- complicate, recurrent; urosepsis, + urine culture  - pt w/ T 102 and  and found to have WBC 11.52 with 80.1% PMNs, and UA with small leukocyte esterase, 6-10 WBCs, moderate bacteria as above  -c/s resulted: apprec ID start ivanz, monitor repeat cultures  - apprec ID further recs

## 2018-07-23 NOTE — DISCHARGE NOTE ADULT - CARE PROVIDER_API CALL
Anshu Smalls (DO), Family Medicine  [address] [facility] [address]  [address]  [place] [address]  Phone: [phone]  Fax: [phone]    Aristides Laughlin), Internal Medicine  [address] [place] [address]  Phone: [phone]  Fax: [phone] Anshu Smalls (DO), Family Medicine  [facility] [facility]  [address]  [place] [address]  Phone: [phone]  Fax: [phone]    Aristides Laughlin), Internal Medicine  [address] [place] [address]  Phone: [phone]  Fax: [phone]    Juan Araya), Vascular Surgery  [address] [facility]  [address] [place] [address]  Phone: [phone]  Fax: [phone] Aristides Laughlin), Internal Medicine  [address] [place] [address]  Phone: [phone]  Fax: [phone]    Tahir Lockhart (), Family Medicine  [address] [place] [address]  [address]  [place] [address]  Phone: [phone]  Fax: [phone]    ManvarSingh, Pallavi B (MD), Surgery Vascular  [address] [address]  Phone: [phone]  Fax: [phone]    Doris aJimes), Infectious Disease; Internal Medicine  [address] [facility] [address] [facility]  [address]  [place] [address]  Phone: [phone]  Fax: [phone]

## 2018-07-23 NOTE — PROGRESS NOTE ADULT - SUBJECTIVE AND OBJECTIVE BOX
Patient is a 83y old  Female who presents with a chief complaint of fever, x 1 week, weakness (23 Jul 2018 10:18)      INTERVAL HPI: Pt seen and examined. States she is feeling more tired and weak today, as well as urinary frequency. Denies any other acute complaints at this time.    OVERNIGHT EVENTS: none noted  T(F): 97.7 (07-23-18 @ 14:01), Max: 97.8 (07-22-18 @ 20:22)  HR: 81 (07-23-18 @ 14:01) (70 - 82)  BP: 115/71 (07-23-18 @ 14:01) (115/71 - 169/83)  RR: 17 (07-23-18 @ 14:01) (16 - 17)  SpO2: 97% (07-23-18 @ 14:01) (95% - 97%)  I&O's Summary    22 Jul 2018 07:01  -  23 Jul 2018 07:00  --------------------------------------------------------  IN: 660 mL / OUT: 0 mL / NET: 660 mL        REVIEW OF SYSTEMS: 12 systems noncontributory other than that stated in hpi    PHYSICAL EXAM:  GENERAL: NAD, frail, elder  HEAD:  Atraumatic, Normocephalic  EYES: EOMI, PERRLA, conjunctiva and sclera clear  ENMT: No tonsillar erythema, exudates, or enlargement; Moist mucous membranes, Good dentition, No lesions  NECK: Supple, No JVD, Normal thyroid  NERVOUS SYSTEM:  Alert & Oriented X3, lethargic and weak  CHEST/LUNG: Clear to percussion bilaterally; No rales, rhonchi, wheezing, or rubs  HEART: Regular rate and rhythm; No murmurs, rubs, or gallops  ABDOMEN: Soft, Nontender, Nondistended; Bowel sounds present  EXTREMITIES:  2+ Peripheral Pulses, No clubbing, cyanosis, or edema  SKIN: No rashes or lesions    LABS:                        9.4    7.04  )-----------( 297      ( 23 Jul 2018 07:19 )             30.1     07-23    147<H>  |  112<H>  |  25<H>  ----------------------------<  89  3.9   |  28  |  1.20    Ca    8.5      23 Jul 2018 07:19    TPro  6.2  /  Alb  2.5<L>  /  TBili  0.4  /  DBili  x   /  AST  25  /  ALT  55  /  AlkPhos  257<H>  07-23    PT/INR - ( 23 Jul 2018 07:19 )   PT: 19.4 sec;   INR: 1.76 ratio             CAPILLARY BLOOD GLUCOSE          07-22 @ 10:30   No growth to date.  --  --  07-21 @ 10:37   Growth in anaerobic bottle: Escherichia coli  "Due to technical problems, Proteus sp. will Not be reported as part of  the BCID panel until further notice"  ***Blood Panel PCR results on this specimen are available  approximately 3 hours after the Gram stain result.***  Gram stain, PCR, and/or culture results may not always  correspond due to difference in methodologies.  ************************************************************  This PCR assay was performed using [employer].  The following targets are tested for: Enterococcus,  vancomycin resistant enterococci, Listeria monocytogenes,  coagulase negative staphylococci, S. aureus,  methicillin resistant S. aureus, Streptococcus agalactiae  (Group B), S. pneumoniae, S. pyogenes (Group A),  Acinetobacter baumannii, Enterobacter cloacae, E. coli,  Klebsiella oxytoca, K. pneumoniae, Proteus sp.,  Serratia marcescens, Haemophilus influenzae,  Neisseria meningitidis, Pseudomonas aeruginosa, Candida  albicans, C. glabrata, C krusei, C parapsilosis,  C. tropicalis and the KPC resistance gene.  --  Blood Culture PCR  07-21 @ 10:33   No growth to date.  --  --  07-21 @ 10:03   >100,000 CFU/ml Escherichia coli ESBL  >100,000 CFU/ml Klebsiella pneumoniae  --  Escherichia coli ESBL          MEDICATIONS  (STANDING):  aspirin enteric coated 81 milliGRAM(s) Oral daily  atorvastatin 40 milliGRAM(s) Oral at bedtime  diltiazem    Tablet 30 milliGRAM(s) Oral every 6 hours  ertapenem  IVPB      ertapenem  IVPB 1000 milliGRAM(s) IV Intermittent once  gabapentin 100 milliGRAM(s) Oral three times a day  hydrALAZINE 50 milliGRAM(s) Oral three times a day  metoprolol tartrate 25 milliGRAM(s) Oral every 6 hours  mirtazapine 7.5 milliGRAM(s) Oral at bedtime  pantoprazole    Tablet 40 milliGRAM(s) Oral before breakfast  warfarin 4 milliGRAM(s) Oral once    MEDICATIONS  (PRN):  acetaminophen   Tablet 650 milliGRAM(s) Oral every 8 hours PRN Mild Pain (1 - 3)  docusate sodium 100 milliGRAM(s) Oral two times a day PRN Constipation  polyethylene glycol 3350 17 Gram(s) Oral daily PRN Constipation  senna 2 Tablet(s) Oral at bedtime PRN Constipation

## 2018-07-23 NOTE — PROGRESS NOTE ADULT - SUBJECTIVE AND OBJECTIVE BOX
CARITO CONSTANTINO is a 83yFemale , patient examined and chart reviewed.     INTERVAL HPI/ OVERNIGHT EVENTS:   Feeling better. Afebrile.  Seen by GI.    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  Osteoarthritis  Afib  PVD (peripheral vascular disease)  Neuropathy: (Right lower leg)  H/O cerebral aneurysm repair: brain clips  CVA (cerebral vascular accident): (&quot;Mini-stroke&quot;,1990&#x27;s)  Rheumatoid arthritis  Hyperlipidemia  Hypertension  S/P cataract surgery: (Left eye)  Elective surgery: (&quot;Twisted bowel&quot;, 2014)  Elective surgery: (Exision of cyst on liver, 1985)  S/P ORIF (open reduction internal fixation) fracture: Left hip fx (2012) &amp; R hip fx (2013)  H/O cerebral aneurysm repair: Brain clips (1978(  Renal stone: Cysto stent placement 10/1/2014  PVD (peripheral vascular disease): s/p RLE bypass x 3, most recent 3/2012 Right external iliac to PT bypass w/ PTFE (2012)  S/P FRED (total abdominal hysterectomy): (1987, Hx of &quot;ovarian cancer?&quot;)    For details regarding the patient's social history, family history, and other miscellaneous elements, please refer the initial infectious diseases consultation and/or the admitting history and physical examination for this admission.    ROS:  CONSTITUTIONAL:  Negative fever or chills  EYES:  Negative  blurry vision or double vision  CARDIOVASCULAR:  Negative for chest pain or palpitations  RESPIRATORY:  Negative for cough, wheezing, or SOB   GASTROINTESTINAL:  Negative for nausea, vomiting, diarrhea, constipation, or abdominal pain  GENITOURINARY:  Negative frequency, urgency or dysuria  NEUROLOGIC:  No headache, confusion, dizziness, lightheadedness  All other systems were reviewed and are negative       Current inpatient medications :    ANTIBIOTICS/RELEVANT:  cefTRIAXone   IVPB 1 Gram(s) IV Intermittent every 24 hours      acetaminophen   Tablet 650 milliGRAM(s) Oral every 8 hours PRN  aspirin enteric coated 81 milliGRAM(s) Oral daily  atorvastatin 40 milliGRAM(s) Oral at bedtime  diltiazem    Tablet 30 milliGRAM(s) Oral every 6 hours  docusate sodium 100 milliGRAM(s) Oral two times a day PRN  gabapentin 100 milliGRAM(s) Oral three times a day  hydrALAZINE 50 milliGRAM(s) Oral three times a day  metoprolol tartrate 25 milliGRAM(s) Oral every 6 hours  mirtazapine 7.5 milliGRAM(s) Oral at bedtime  pantoprazole    Tablet 40 milliGRAM(s) Oral before breakfast  polyethylene glycol 3350 17 Gram(s) Oral daily PRN  senna 2 Tablet(s) Oral at bedtime PRN  warfarin 4 milliGRAM(s) Oral once      Objective:    07-22 @ 07:01  -  07-23 @ 07:00  --------------------------------------------------------  IN: 660 mL / OUT: 0 mL / NET: 660 mL      T(C): 36.5 (07-23-18 @ 14:01), Max: 36.6 (07-22-18 @ 20:22)  HR: 81 (07-23-18 @ 14:01) (70 - 82)  BP: 115/71 (07-23-18 @ 14:01) (115/71 - 169/83)  RR: 17 (07-23-18 @ 14:01) (16 - 17)  SpO2: 97% (07-23-18 @ 14:01) (95% - 97%)  Wt(kg): --      Physical Exam:  General: no acute distress  Eyes: sclera anicteric, pupils equal and reactive to light  ENMT: buccal mucosa moist, pharynx not injected  Neck: supple, trachea midline  Lungs: clear, no wheeze/rhonchi  Cardiovascular: regular rate and rhythm, S1 S2  Abdomen: soft, nontender, no organomegaly present, bowel sounds normal  Neurological: alert and oriented x3, Cranial Nerves II-XII grossly intact  Skin: no increased ecchymosis/petechiae/purpura  Lymph Nodes: no palpable cervical/supraclavicular lymph nodes enlargements  Extremities: no cyanosis/clubbing/edema        LABS:                          9.4    7.04  )-----------( 297      ( 23 Jul 2018 07:19 )             30.1       07-23    147<H>  |  112<H>  |  25<H>  ----------------------------<  89  3.9   |  28  |  1.20    Ca    8.5      23 Jul 2018 07:19    TPro  6.2  /  Alb  2.5<L>  /  TBili  0.4  /  DBili  x   /  AST  25  /  ALT  55  /  AlkPhos  257<H>  07-23      PT/INR - ( 23 Jul 2018 07:19 )   PT: 19.4 sec;   INR: 1.76 ratio        MICROBIOLOGY:    Culture - Blood (collected 22 Jul 2018 10:30)  Source: .Blood Blood-Venous  Preliminary Report (23 Jul 2018 11:01):    No growth to date.    Culture - Blood (collected 21 Jul 2018 10:37)  Source: .Blood Blood-Peripheral  Gram Stain (22 Jul 2018 01:34):    Growth in anaerobic bottle: Gram Negative Rods  Preliminary Report (22 Jul 2018 18:38):    Growth in anaerobic bottle: Escherichia coli    "Due to technical problems, Proteus sp. will Not be reported as part of    the BCID panel until further notice"    ***Blood Panel PCR results on this specimen are available    approximately 3 hours after the Gram stain result.***    Gram stain, PCR, and/or culture results may not always    correspond due to difference in methodologies.    ************************************************************    This PCR assay was performed using [employer].    The following targets are tested for: Enterococcus,    vancomycin resistant enterococci, Listeria monocytogenes,    coagulase negative staphylococci, S. aureus,    methicillin resistant S. aureus, Streptococcus agalactiae    (Group B), S. pneumoniae, S. pyogenes (Group A),    Acinetobacter baumannii, Enterobacter cloacae, E. coli,    Klebsiella oxytoca, K. pneumoniae, Proteus sp.,    Serratia marcescens, Haemophilus influenzae,    Neisseria meningitidis, Pseudomonas aeruginosa, Candida    albicans, C. glabrata, C krusei, C parapsilosis,    C. tropicalis and the KPC resistance gene.  Organism: Blood Culture PCR (22 Jul 2018 03:32)  Organism: Blood Culture PCR (22 Jul 2018 03:32)      -  Escherichia coli: Detec      Method Type: PCR    Culture - Blood (collected 21 Jul 2018 10:33)  Source: .Blood Blood-Peripheral  Preliminary Report (22 Jul 2018 11:01):    No growth to date.    Culture - Urine (collected 21 Jul 2018 10:03)  Source: .Urine Clean Catch (Midstream)  Final Report (23 Jul 2018 10:17):    >100,000 CFU/ml Escherichia coli ESBL    >100,000 CFU/ml Klebsiella pneumoniae  Organism: Escherichia coli ESBL  Klebsiella pneumoniae (23 Jul 2018 10:17)  Organism: Klebsiella pneumoniae (23 Jul 2018 10:17)      -  Amikacin: S <=8      -  Amoxicillin/Clavulanic Acid: S <=8/4      -  Ampicillin: R >16 These ampicillin results predict results for amoxicillin      -  Ampicillin/Sulbactam: S 8/4      -  Aztreonam: S <=4      -  Cefazolin: S <=2 This predicts results for oral agents cefaclor, cefdinir, cefpodoxime, cefprozil, cefuroxime axetil, cephalexin and locarbef for uncomplicated UTI. Note that some isolates may be susceptible to these agents while testing resistant to cefazolin.      -  Cefepime: S <=2      -  Cefoxitin: S <=4      -  Ceftriaxone: S <=1 Enterobacter, Citrobacter, and Serratia may develop resistance during prolonged therapy      -  Ciprofloxacin: S <=0.5      -  Ertapenem: S <=0.5      -  Gentamicin: S <=1      -  Imipenem: S <=1      -  Levofloxacin: S <=1      -  Meropenem: S <=1      -  Nitrofurantoin: S <=32 Should not be used to treat pyelonephritis      -  Piperacillin/Tazobactam: S <=8      -  Tigecycline: S <=1      -  Tobramycin: S <=2      -  Trimethoprim/Sulfamethoxazole: S <=0.5/9.5      Method Type: KWABENA  Organism: Escherichia coli ESBL (23 Jul 2018 10:17)      -  Amikacin: S <=8      -  Amoxicillin/Clavulanic Acid: I 16/8      -  Ampicillin: R >16 These ampicillin results predict results for amoxicillin      -  Ampicillin/Sulbactam: R >16/8      -  Aztreonam: R >16      -  Cefazolin: R >16 This predicts results for oral agents cefaclor, cefdinir, cefpodoxime, cefprozil, cefuroxime axetil, cephalexin and locarbef for uncomplicated UTI. Note that some isolates may be susceptible to these agents while testing resistant to cefazolin.      -  Cefepime: R >16      -  Cefoxitin: S <=4      -  Ceftriaxone: R >32 Enterobacter, Citrobacter, and Serratia may develop resistance during prolonged therapy      -  Ciprofloxacin: R >2      -  Ertapenem: S <=0.5      -  Gentamicin: S <=1      -  Imipenem: S <=1      -  Levofloxacin: R >4      -  Meropenem: S <=1      -  Nitrofurantoin: S <=32 Should not be used to treat pyelonephritis      -  Piperacillin/Tazobactam: R <=8      -  Tigecycline: S <=1      -  Tobramycin: R >8      -  Trimethoprim/Sulfamethoxazole: R >2/38      Method Type: KWABENA    RADIOLOGY & ADDITIONAL STUDIES:    Assessment :  82 yo female w/ PMH of afib on coumadin, CVA and hx of aneurysm repair, HTN, HLD, mitral   valve prolapse, PVD s/p stents x 3 (RLE), 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  Appropriate use of antibiotics and adverse effects reviewed.      I have discussed the above plan of care with patient/ family in detail. They expressed understanding of the  treatment plan . Risks, benefits and alternatives discussed in detail. I have asked if they have any questions or concerns and appropriately addressed them to the best of my ability .    > 35 minutes were spent in direct patient care reviewing notes, medications ,labs data/ imaging , discussion with multidisciplinary team.    Thank you for allowing me to participate in care of your patient .    Cynthia Jaimes MD  Infectious Disease  [phone]

## 2018-07-23 NOTE — PROGRESS NOTE ADULT - PROBLEM SELECTOR PLAN 7
small size, asymptomatic  stable compared to CT on 10/30/2017  pt sees Dr. Ponce as outpt vasc  cont statin and BB

## 2018-07-23 NOTE — DISCHARGE NOTE ADULT - HOSPITAL COURSE
82 yo female w/ PMH of afib on coumadin, CVA and hx of aneurysm, HTN, HLD, mitral valve prolapse, PVD s/p stents x 3 (RLE), rheumatoid arthritis, who presents with one week of fever (measured at home, pt doesn't remember temp), diaphoresis chills, weakness, and lethargy, found with T 102 and  and found to have WBC 11.52 with 80.1% PMNs, and UA with small leukocyte esterase, 6-10 WBCs, moderate bacteria consistent with sepsis sec to  culture + GNR in blood and ecoli/klebsiella in urine. Pt was seen by infectious diseases and recommended course of antibiotics. She was also seen by vascular surgery who deemed no acute interventions at this time, Pt was also seen by PT who recommended home with PT. Pt also saw her cardiologist during this hospitilzation for routine care. Pt saw GI for concern of acute cholecystitis but HIDA scan was wnl.   Pt improved and stable for discharge.     Time spent: 65 minutes 84 yo female w/ PMH of afib on coumadin, CVA and hx of aneurysm, HTN, HLD, mitral valve prolapse, PVD s/p stents x 3 (RLE), rheumatoid arthritis, who presents with one week of fever (measured at home, pt doesn't remember temp), diaphoresis chills, weakness, and lethargy, found with T 102 and  and found to have WBC 11.52 with 80.1% PMNs, and UA with small leukocyte esterase, 6-10 WBCs, moderate bacteria consistent with sepsis sec to  culture + GNR in blood and ecoli/klebsiella in urine. Pt was seen by infectious diseases and recommended course of antibiotics. She was also seen by vascular surgery who deemed no acute interventions at this time, Pt was also seen by PT who recommended home with PT. Pt also saw her cardiologist during this hospitilzation for routine care. Pt saw GI for concern of acute cholecystitis but HIDA scan was wnl.   Pt improved and stable for discharge.     During her hospital stay, INR was sub-therapeutic. On discharge day it was 1.35. This was discussed with cardiology who cleared her for discharge without bridging and INR check soon after discharge. This was discussed with the patient and she understood the need to follow up outpatient with her PMD to have this corrected and monitored.    On discharge date, vitals and physical exam were:  Vital Signs (24 Hrs):  T(C): 36.3 (08-01-18 @ 04:53), Max: 36.8 (07-31-18 @ 13:51)  HR: 82 (08-01-18 @ 04:53) (80 - 83)  BP: 153/85 (08-01-18 @ 04:53) (108/71 - 153/85)  RR: 17 (08-01-18 @ 04:53) (16 - 17)  SpO2: 95% (08-01-18 @ 04:53) (93% - 95%)    Physical Exam:  General: Well developed, well nourished, NAD  HEENT: NCAT, PERRLA, moist mucous membranes   Neck: Supple, nontender, no mass  Neurology: A&Ox3, nonfocal, CN II-XII grossly intact, sensation intact  Respiratory: CTA B/L, No W/R/R  CV: RRR, +S1/S2, no murmurs, rubs or gallops  Abdominal: Soft, NT, ND +BSx4  Extremities: No C/C/E  Skin: warm, dry, normal color, no rash or abnormal lesions 82 yo female w/ PMH of afib on coumadin, CVA and hx of aneurysm, HTN, HLD, mitral valve prolapse, PVD s/p stents x 3 (RLE), rheumatoid arthritis, who presents with one week of fever (measured at home, pt doesn't remember temp), diaphoresis chills, weakness, and lethargy, found with T 102 and  and found to have WBC 11.52 with 80.1% PMNs, and UA with small leukocyte esterase, 6-10 WBCs, moderate bacteria consistent with sepsis sec to  culture + GNR in blood and ecoli/klebsiella in urine. Pt was seen by infectious diseases and recommended course of antibiotics. She was also seen by vascular surgery who deemed no acute interventions at this time, Pt was also seen by PT who recommended home with PT. Pt also saw her cardiologist during this hospitilzation for routine care. Pt saw GI for concern of acute cholecystitis but HIDA scan was wnl.   Pt improved and stable for discharge.      In hospital INR was subtherapeutic, please continue taking your Coumadin. INR must be re-checked outpatient with your doctor. Per out Cardiologist Dr. Moya , does not need to be bridged and can continue home dose coumadin. Spoke to daughter Dixie and advised to f/u with Cardio within the next couple days to get INR checked and in case needs to adjust coumadin dose.       On discharge date, vitals and physical exam were:Vital Signs (24 Hrs)  T(C): 36.3 (08-01-18 @ 04:53), Max: 36.8 (07-31-18 @ 13:51)  HR: 82 (08-01-18 @ 04:53) (80 - 83)  BP: 153/85 (08-01-18 @ 04:53) (108/71 - 153/85)  RR: 17 (08-01-18 @ 04:53) (16 - 17)  SpO2: 95% (08-01-18 @ 04:53) (93% - 95%)    Physical Exam:  General: Well developed, well nourished, NAD  HEENT: NCAT, PERRLA, moist mucous membranes   Neck: Supple, nontender, no mass  Neurology: A&Ox3, nonfocal, CN II-XII grossly intact, sensation intact  Respiratory: CTA B/L, No W/R/R  CV: RRR, +S1/S2, no murmurs, rubs or gallops  Abdominal: Soft, NT, ND +BSx4  Extremities: No C/C/E  Skin: warm, dry, normal color, no rash or abnormal lesions

## 2018-07-23 NOTE — DISCHARGE NOTE ADULT - CARE PLAN
Principal Discharge DX:	UTI (urinary tract infection)  Goal:	resolutioon  Assessment and plan of treatment:	-complete all medications as prescribed  -follow up with your primary care provider within 1 week of discharge  Secondary Diagnosis:	Chronic atrial fibrillation  Goal:	stable  Assessment and plan of treatment:	-take all medications as prescribed  -follow up with your cardiologist within 1 week of discharge  Secondary Diagnosis:	PVD (peripheral vascular disease) with claudication  Goal:	stable  Assessment and plan of treatment:	-cont all medications  -see your vascular physician within 1-2 weeks of discharge Principal Discharge DX:	UTI (urinary tract infection)  Goal:	resolution  Assessment and plan of treatment:	-complete all medications as prescribed  -follow up with your primary care provider within 1 week of discharge  Secondary Diagnosis:	Chronic atrial fibrillation  Goal:	stable  Assessment and plan of treatment:	-take all medications as prescribed  -follow up with your cardiologist within 1 week of discharge  Secondary Diagnosis:	PVD (peripheral vascular disease) with claudication  Goal:	stable  Assessment and plan of treatment:	-cont all medications  -see your vascular physician within 1-2 weeks of discharge Principal Discharge DX:	UTI (urinary tract infection)  Goal:	resolution  Assessment and plan of treatment:	- Resolution upon completion of 10 day course of antibiotics.   - follow up with your primary care provider and the infectious disease specialist within 1 week of discharge  Secondary Diagnosis:	Chronic atrial fibrillation  Goal:	stable  Assessment and plan of treatment:	- continue to take diltiazem, metoprolol and coumadin as prescribed  - In hospital INR was subtherapeutic, please continue taking your Coumadin. INR must be re-checked outpatient with your Primary Care Doctor.  Secondary Diagnosis:	PVD (peripheral vascular disease) with claudication  Goal:	stable  Assessment and plan of treatment:	-continue taking aspirin   -see your vascular physician within 1-2 weeks of discharge  Secondary Diagnosis:	Hypertension  Goal:	controlled  Assessment and plan of treatment:	- continue taking hydralazine and metoprolol as prescribed  Secondary Diagnosis:	Anemia  Assessment and plan of treatment:	- follow up with your PMD to monitor cbc Principal Discharge DX:	UTI (urinary tract infection)  Goal:	resolution  Assessment and plan of treatment:	- Resolution upon completion of 10 day course of antibiotics.   - follow up with your primary care provider and the infectious disease specialist within 1 week of discharge  Secondary Diagnosis:	Chronic atrial fibrillation  Goal:	stable  Assessment and plan of treatment:	- continue to take diltiazem, metoprolol and coumadin as prescribed  - In hospital INR was subtherapeutic, please continue taking your Coumadin. INR must be re-checked outpatient with your doctor. Per out Cardiologist Dr. Moya , does not need to be bridged and can continue home dose coumadin. Spoke to daughter Dixie and advised to f/u with Cardio within the next couple days to get INR checked and in case needs to adjust coumadin dose.  Secondary Diagnosis:	PVD (peripheral vascular disease) with claudication  Goal:	stable  Assessment and plan of treatment:	-continue taking aspirin   -see your vascular physician within 1-2 weeks of discharge  Secondary Diagnosis:	Hypertension  Goal:	controlled  Assessment and plan of treatment:	- continue taking hydralazine and metoprolol as prescribed  Secondary Diagnosis:	Anemia  Assessment and plan of treatment:	- follow up with your PMD to monitor cbc Principal Discharge DX:	UTI (urinary tract infection)  Goal:	resolution  Assessment and plan of treatment:	-Suspect sepsis secondary to UTI likely POA.  - Resolution upon completion of 10 day course of antibiotics.   - follow up with your primary care provider and the infectious disease specialist within 1 week of discharge  Secondary Diagnosis:	Chronic atrial fibrillation  Goal:	stable  Assessment and plan of treatment:	- continue to take diltiazem, metoprolol and coumadin as prescribed  - In hospital INR was subtherapeutic, please continue taking your Coumadin. INR must be re-checked outpatient with your doctor. Per out Cardiologist Dr. Moya , does not need to be bridged and can continue home dose coumadin. Spoke to daughter Dixie and advised to f/u with Cardio within the next couple days to get INR checked and in case needs to adjust coumadin dose.  Secondary Diagnosis:	PVD (peripheral vascular disease) with claudication  Goal:	stable  Assessment and plan of treatment:	-continue taking aspirin   -see your vascular physician within 1-2 weeks of discharge  Secondary Diagnosis:	Hypertension  Goal:	controlled  Assessment and plan of treatment:	- continue taking hydralazine and metoprolol as prescribed  Secondary Diagnosis:	Anemia  Assessment and plan of treatment:	- follow up with your PMD to monitor cbc

## 2018-07-23 NOTE — DISCHARGE NOTE ADULT - MEDICATION SUMMARY - MEDICATIONS TO STOP TAKING
I will STOP taking the medications listed below when I get home from the hospital:    enoxaparin 40 mg/0.4 mL injectable solution  -- 45 unit(s) injectable 2 times a day. Until INR Therapeutic at 2 to 3. Couamdin dose daily based upon INR.    warfarin 6 mg oral tablet  -- 1 tab(s) by mouth once. Daily coumadin dose based upon INR. I will STOP taking the medications listed below when I get home from the hospital:    metoprolol tartrate 25 mg oral tablet  -- 1 tab(s) by mouth every 6 hours    dilTIAZem 30 mg oral tablet  -- 1 tab(s) by mouth every 6 hours    enoxaparin 40 mg/0.4 mL injectable solution  -- 45 unit(s) injectable 2 times a day. Until INR Therapeutic at 2 to 3. Couamdin dose daily based upon INR.    warfarin 6 mg oral tablet  -- 1 tab(s) by mouth once. Daily coumadin dose based upon INR.

## 2018-07-23 NOTE — CONSULT NOTE ADULT - SUBJECTIVE AND OBJECTIVE BOX
[facility]         Dahiana Bañuelos, Evens, Toney, Terrie, Fiilberto Laughlin        [phone] (office)    CHIEF COMPLAINT: Patient is a 83y old  Female who presents with a chief complaint of fever, x 1 week, weakness (23 Jul 2018 10:18)      HPI:  82 yo female w/ PMH of afib on coumadin, CVA and hx of aneurysm repair, HTN, HLD, mitral valve prolapse, PVD s/p stents x 3 (RLE), rheumatoid arthritis, who presents with one week of subjective fever (measured at home, pt doesn't remember temp), diaphoresis chills, weakness, and lethargy.     The patient was in her usual state of health until one week prior to presentation when she started to feel subjective fevers, chills, and diaphoresis. She then measured her temperature on the day of admission was noted it was "high" though she couldn't remember the exact temperature. She endorsed nocturia and urinary incontinence, though she notes both the nocturia and urinary incontinence are chronic and the frequency remains unchanged from prior.  She denies dysuria, hematuria, urinary frequency, urinary hesitancy.     She denies cough, SOB, CRABTREE, chest pain, palpitations. Of note, she endorses "a few days" of diarrhea that resolved without intervention one week prior to presentation. She denies recent travel, sick contacts.       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  Osteoarthritis  Afib  PVD (peripheral vascular disease)  Neuropathy: (Right lower leg)  H/O cerebral aneurysm repair: brain clips  CVA (cerebral vascular accident): (&quot;Mini-stroke&quot;,1990&#x27;s)  Rheumatoid arthritis  Hyperlipidemia  Hypertension  S/P cataract surgery: (Left eye)  Elective surgery: (&quot;Twisted bowel&quot;, 2014)  Elective surgery: (Exision of cyst on liver, 1985)  S/P ORIF (open reduction internal fixation) fracture: Left hip fx (2012) &amp; R hip fx (2013)  H/O cerebral aneurysm repair: Brain clips (1978(  Renal stone: Cysto stent placement 10/1/2014  PVD (peripheral vascular disease): s/p RLE bypass x 3, most recent 3/2012 Right external iliac to PT bypass w/ PTFE (2012)  S/P FRED (total abdominal hysterectomy): (1987, Hx of &quot;ovarian cancer?&quot;)      SOCIAL HISTORY: No active tobacco, alcohol or illicit drug use    FAMILY HISTORY:  No pertinent family history in first degree relatives   No pertinent family history of CAD    Outpatient medications:    MEDICATIONS  (STANDING):  aspirin enteric coated 81 milliGRAM(s) Oral daily  atorvastatin 40 milliGRAM(s) Oral at bedtime  cefTRIAXone   IVPB 1 Gram(s) IV Intermittent every 24 hours  diltiazem    Tablet 30 milliGRAM(s) Oral every 6 hours  gabapentin 100 milliGRAM(s) Oral three times a day  hydrALAZINE 50 milliGRAM(s) Oral three times a day  metoprolol tartrate 25 milliGRAM(s) Oral every 6 hours  mirtazapine 7.5 milliGRAM(s) Oral at bedtime  pantoprazole    Tablet 40 milliGRAM(s) Oral before breakfast    MEDICATIONS  (PRN):  acetaminophen   Tablet 650 milliGRAM(s) Oral every 8 hours PRN Mild Pain (1 - 3)  docusate sodium 100 milliGRAM(s) Oral two times a day PRN Constipation  polyethylene glycol 3350 17 Gram(s) Oral daily PRN Constipation  senna 2 Tablet(s) Oral at bedtime PRN Constipation      Allergies    No Known Allergies    Intolerances        REVIEW OF SYSTEMS: Is negative for eye, ENT, GI, , allergic, dermatologic, musculoskeletal and neurologic, except as described above.    VITAL SIGNS:   Vital Signs Last 24 Hrs  T(C): 36.4 (23 Jul 2018 04:53), Max: 36.7 (22 Jul 2018 14:02)  T(F): 97.6 (23 Jul 2018 04:53), Max: 98.1 (22 Jul 2018 14:02)  HR: 70 (23 Jul 2018 04:53) (70 - 84)  BP: 169/83 (23 Jul 2018 04:53) (134/77 - 169/83)  BP(mean): --  RR: 16 (23 Jul 2018 04:53) (16 - 18)  SpO2: 96% (23 Jul 2018 04:53) (95% - 97%)    I&O's Summary    22 Jul 2018 07:01  -  23 Jul 2018 07:00  --------------------------------------------------------  IN: 660 mL / OUT: 0 mL / NET: 660 mL        PHYSICAL EXAM:    Constitutional: NAD, awake and alert, well-developed  Eyes:  EOMI, no oral cyanosis, conjunctivae clear, anicteric.  Pulmonary: Non-labored, breath sounds are clear bilaterally, no wheezing, rales or rhonchi  Cardiovascular:  regular S1 and S2. No murmur.  No rubs, gallops or clicks  Gastrointestinal: Bowel Sounds present, soft, nontender.   Lymph: No peripheral edema.   Neurological: Alert, strength and sensitivity are grossly intact  Skin: No obvious lesions/rashes.   Psych:  Mood & affect appropriate .    LABS: All Labs Reviewed:                        9.4    7.04  )-----------( 297      ( 23 Jul 2018 07:19 )             30.1                         9.3    8.48  )-----------( 282      ( 22 Jul 2018 07:35 )             29.9                         8.6    8.28  )-----------( 231      ( 21 Jul 2018 04:49 )             27.4     23 Jul 2018 07:19    147    |  112    |  25     ----------------------------<  89     3.9     |  28     |  1.20   22 Jul 2018 07:35    148    |  114    |  22     ----------------------------<  90     4.0     |  26     |  1.10   21 Jul 2018 04:49    146    |  114    |  30     ----------------------------<  88     4.1     |  24     |  1.10     Ca    8.5        23 Jul 2018 07:19  Ca    8.2        22 Jul 2018 07:35  Ca    7.6        21 Jul 2018 04:49    TPro  6.2    /  Alb  2.5    /  TBili  0.4    /  DBili  x      /  AST  25     /  ALT  55     /  AlkPhos  257    23 Jul 2018 07:19  TPro  5.9    /  Alb  2.4    /  TBili  0.6    /  DBili  x      /  AST  27     /  ALT  64     /  AlkPhos  249    22 Jul 2018 07:35  TPro  5.4    /  Alb  2.3    /  TBili  0.5    /  DBili  .20    /  AST  35     /  ALT  75     /  AlkPhos  245    21 Jul 2018 04:49    PT/INR - ( 23 Jul 2018 07:19 )   PT: 19.4 sec;   INR: 1.76 ratio               Blood Culture: Organism --  Gram Stain Blood -- Gram Stain --  Specimen Source .Blood Blood-Venous  Culture-Blood --    Organism Blood Culture PCR  Gram Stain Blood -- Gram Stain   Growth in anaerobic bottle: Gram Negative Rods  Specimen Source .Blood Blood-Peripheral  Culture-Blood --    Organism --  Gram Stain Blood -- Gram Stain --  Specimen Source .Blood Blood-Peripheral  Culture-Blood --    Organism Escherichia coli ESBL  Gram Stain Blood -- Gram Stain --  Specimen Source .Urine Clean Catch (Midstream)  Culture-Blood --        07-22 @ 07:35  TSH: 2.10      RADIOLOGY:    EKG:     Impression/Plan: [facility]         Dahiana Bañuelos, Evnes, Toney, Terrie, Filiberto Laughlin        [phone] (office)    CHIEF COMPLAINT: Patient is a 83y old  Female who presents with a chief complaint of fever, x 1 week, weakness (23 Jul 2018 10:18)      HPI:  84 yo female 83 year-old woman with a history of hypercholesterolemia, hypertension, rheumatoid arthritis, cerebral aneurysm status post clipping, PAD status post femoropopliteal bypass with subsequent right leg femoropopliteal bypass thrombosis and infected wound, redo bypass with failure, then PCI with failure, paroxysmal atrial fibrillation previously on AC, UGIB, SBO, HFEPF  who presents with one week of subjective fever (measured at home, pt doesn't remember temp), diaphoresis chills, weakness, and lethargy. Being currently treated for Ecoli urosepsis.     The patient was in her usual state of health until one week prior to presentation when she started to feel subjective fevers, chills, and diaphoresis. She then measured her temperature on the day of admission was noted it was "high" though she couldn't remember the exact temperature. She endorsed nocturia and urinary incontinence, though she notes both the nocturia and urinary incontinence are chronic and the frequency remains unchanged from prior.  She denies dysuria, hematuria, urinary frequency, urinary hesitancy.     She denies cough, SOB, CRABTREE, chest pain, palpitations. Of note, she endorses "a few days" of diarrhea that resolved without intervention one week prior to presentation. She denies recent travel, sick contacts.          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  Osteoarthritis  Afib  PVD (peripheral vascular disease)  Neuropathy: (Right lower leg)  H/O cerebral aneurysm repair: brain clips  CVA (cerebral vascular accident): (&quot;Mini-stroke&quot;,1990&#x27;s)  Rheumatoid arthritis  Hyperlipidemia  Hypertension  S/P cataract surgery: (Left eye)  Elective surgery: (&quot;Twisted bowel&quot;, 2014)  Elective surgery: (Exision of cyst on liver, 1985)  S/P ORIF (open reduction internal fixation) fracture: Left hip fx (2012) &amp; R hip fx (2013)  H/O cerebral aneurysm repair: Brain clips (1978(  Renal stone: Cysto stent placement 10/1/2014  PVD (peripheral vascular disease): s/p RLE bypass x 3, most recent 3/2012 Right external iliac to PT bypass w/ PTFE (2012)  S/P FRED (total abdominal hysterectomy): (1987, Hx of &quot;ovarian cancer?&quot;)      SOCIAL HISTORY: No active tobacco, alcohol or illicit drug use    FAMILY HISTORY:  No pertinent family history in first degree relatives   No pertinent family history of CAD    Outpatient medications:    MEDICATIONS  (STANDING):  aspirin enteric coated 81 milliGRAM(s) Oral daily  atorvastatin 40 milliGRAM(s) Oral at bedtime  cefTRIAXone   IVPB 1 Gram(s) IV Intermittent every 24 hours  diltiazem    Tablet 30 milliGRAM(s) Oral every 6 hours  gabapentin 100 milliGRAM(s) Oral three times a day  hydrALAZINE 50 milliGRAM(s) Oral three times a day  metoprolol tartrate 25 milliGRAM(s) Oral every 6 hours  mirtazapine 7.5 milliGRAM(s) Oral at bedtime  pantoprazole    Tablet 40 milliGRAM(s) Oral before breakfast    MEDICATIONS  (PRN):  acetaminophen   Tablet 650 milliGRAM(s) Oral every 8 hours PRN Mild Pain (1 - 3)  docusate sodium 100 milliGRAM(s) Oral two times a day PRN Constipation  polyethylene glycol 3350 17 Gram(s) Oral daily PRN Constipation  senna 2 Tablet(s) Oral at bedtime PRN Constipation      Allergies    No Known Allergies    Intolerances        REVIEW OF SYSTEMS: Is negative for eye, ENT, GI, , allergic, dermatologic, musculoskeletal and neurologic, except as described above.    VITAL SIGNS:   Vital Signs Last 24 Hrs  T(C): 36.4 (23 Jul 2018 04:53), Max: 36.7 (22 Jul 2018 14:02)  T(F): 97.6 (23 Jul 2018 04:53), Max: 98.1 (22 Jul 2018 14:02)  HR: 70 (23 Jul 2018 04:53) (70 - 84)  BP: 169/83 (23 Jul 2018 04:53) (134/77 - 169/83)  BP(mean): --  RR: 16 (23 Jul 2018 04:53) (16 - 18)  SpO2: 96% (23 Jul 2018 04:53) (95% - 97%)    I&O's Summary    22 Jul 2018 07:01  -  23 Jul 2018 07:00  --------------------------------------------------------  IN: 660 mL / OUT: 0 mL / NET: 660 mL        PHYSICAL EXAM:    Constitutional: NAD, awake and alert, well-developed  Eyes:  EOMI, no oral cyanosis, conjunctivae clear, anicteric.  Pulmonary: Non-labored, breath sounds are clear bilaterally, no wheezing, rales or rhonchi  Cardiovascular:  regular S1 and S2. 1/6 SM   Gastrointestinal: Bowel Sounds present, soft, nontender.   Lymph: No peripheral edema.   Neurological: Alert, strength and sensitivity are grossly intact  Skin: No obvious lesions/rashes.   Psych:  Mood & affect appropriate .    LABS: All Labs Reviewed:                        9.4    7.04  )-----------( 297      ( 23 Jul 2018 07:19 )             30.1                         9.3    8.48  )-----------( 282      ( 22 Jul 2018 07:35 )             29.9                         8.6    8.28  )-----------( 231      ( 21 Jul 2018 04:49 )             27.4     23 Jul 2018 07:19    147    |  112    |  25     ----------------------------<  89     3.9     |  28     |  1.20   22 Jul 2018 07:35    148    |  114    |  22     ----------------------------<  90     4.0     |  26     |  1.10   21 Jul 2018 04:49    146    |  114    |  30     ----------------------------<  88     4.1     |  24     |  1.10     Ca    8.5        23 Jul 2018 07:19  Ca    8.2        22 Jul 2018 07:35  Ca    7.6        21 Jul 2018 04:49    TPro  6.2    /  Alb  2.5    /  TBili  0.4    /  DBili  x      /  AST  25     /  ALT  55     /  AlkPhos  257    23 Jul 2018 07:19  TPro  5.9    /  Alb  2.4    /  TBili  0.6    /  DBili  x      /  AST  27     /  ALT  64     /  AlkPhos  249    22 Jul 2018 07:35  TPro  5.4    /  Alb  2.3    /  TBili  0.5    /  DBili  .20    /  AST  35     /  ALT  75     /  AlkPhos  245    21 Jul 2018 04:49    PT/INR - ( 23 Jul 2018 07:19 )   PT: 19.4 sec;   INR: 1.76 ratio               Blood Culture: Organism --  Gram Stain Blood -- Gram Stain --  Specimen Source .Blood Blood-Venous  Culture-Blood --    Organism Blood Culture PCR  Gram Stain Blood -- Gram Stain   Growth in anaerobic bottle: Gram Negative Rods  Specimen Source .Blood Blood-Peripheral  Culture-Blood --    Organism --  Gram Stain Blood -- Gram Stain --  Specimen Source .Blood Blood-Peripheral  Culture-Blood --    Organism Escherichia coli ESBL  Gram Stain Blood -- Gram Stain --  Specimen Source .Urine Clean Catch (Midstream)  Culture-Blood --      < from: IR Aortogram Abd/Eitan Iliofemoral (11.02.17 @ 10:49) >    EXAM:  IR PROCEDURE NON PICC                          EXAM:  US GUIDANCE VASCULAR ACCESS                          EXAM:  SP ANGIO EXTREMITY RT SI                          EXAM:  SP FLUOROSCOPY TO 1 HOUR                          EXAM:  SP AORTOGRM ABD W RUNOFF BI SI                            PROCEDURE DATE:  11/02/2017          INTERPRETATION:  Abdominal aortogram and right femoral angiogram via left   groin    HISTORY: Ischemic right foot with previous right leg bypass procedures   which were found to be occluded and the patient's recent CTA examination.   Catheter angiogram was requested to better visualize small distal vessels.    PROCEDURE: The patient was brought to the interventional suite and placed   in a supine position. Physiologic monitoring and oxygen saturation were   assessed throughout the procedure.  A time out was performed to verify   that the correct patient was present for the appropriate procedure.    The patient's left groin region was prepped and draped in the usual   sterile fashion. Ultrasound evaluation of the left common femoral artery   showed the artery to the patient. A stored sonographic image was sent to   the hospital's PACS system for permanent archiving.    After injection of local anesthesia (Lidocaine 1%), single wall puncture   of the left common femoral artery was performed. Guidewire and catheter   exchanges were made to advance a 4 Syrian straight catheter which was   advanced over a guidewire to the level of the abdominal aorta and aorto  iliac angiography was performed. Exchange was then made for a 4 Syrian   sheath followed by a 4 Syrian Sos selective catheter which was formed in   the upper abdominal aorta and retracted over guidewire to selectively   catheterize the right commoniliac artery and right leg angiography was   performed at multiple levels along the right leg.    The catheter and sheath were removed and digital pressure was applied.   The patient tolerated the procedure well and was discharged from the   interventional suite in stable condition.    INTERPRETATION: Aortic injection showed ectasia of the upper portion of   the infrarenal abdominal aorta with mild ectasia of the common iliac   arteries and occlusion of the right common femoral artery at the level of   the inguinal ligament. Collateral vessels reconstitute an isolated   segment of the mid right superficial femoral artery. There is also   reconstitution of an isolated segment of the above-knee popliteal artery.   Below the right knee, collateral vessels reconstitute the tibioperoneal   trunk via retrograde flow through the anterior tibial artery. The   posterior tibial artery is occluded. At the midpoint of the right leg,   the anterior tibial artery reconstitutes via collaterals and is not seen   beyond the ankle joint.  The peroneal artery is the remaining single   vessel runoff vessel of the right leg. . Above the ankle, it sends a   large collateral to reconstitute the posterior tibial artery above the   ankle. The dorsalis pedis artery was not seen.    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    Thank you for the courtesy of this referral.      < from: TTE Echo Doppler w/o Cont (02.05.18 @ 08:11) >     EXAM:  ECHO TTE W/O CON COMP W/DOPPLR         PROCEDURE DATE:  02/05/2018        INTERPRETATION:  INDICATION: respiratory failure  Referring M.D.:Posey  Blood Pressure 126/59        Weight (kg) :49     Height (cm):157       BSA (sq m): 1.47  Technician: KL    Dimensions:    LA 3.4       Normal Values: 2.0 - 4.0 cm    Ao 3.0        Normal Values: 2.0 - 3.8 cm  SEPTUM 0.8       Normal Values: 0.6 - 1.2 cm  PWT 0.8       Normal Values: 0.6 - 1.1 cm  LVIDd 3.4         Normal Values: 3.0 - 5.6 cm  LVIDs 2.3         Normal Values: 1.8 - 4.0 cm      OBSERVATIONS:  Technically difficult bedside ICU study. The patient is vented  Mitral Valve: Calcified mitral annulus and mitral valve leaflets with   normal opening. Mild mitral regurgitation.  Aortic Valve/Aorta: Calcified trileaflet aortic valve with normal opening.  Tricuspid Valve: Calcified trileaflet cusp at annulus with normally   opening valve leaflets. Mild tricuspid regurgitation.  Pulmonic Valve: The pulmonic valve is not well visualized. Probably   normal. Mild PI  Left Atrium: Small to moderate left atrial enlargement  Right Atrium: Normal  Left Ventricle: The endocardium is not well-visualized. There appears to   be grossly preserved left ventricular systolic function. There appears to   be concentric left ventricular hypertrophy. The EF is approximately 65%.  Right Ventricle: Normal right ventricular size and function. There is a   device wire noted in the right heart  Pericardium/Pleura: No pericardial effusion noted. Left pleural effusion   noted  Pulmonary/RV Pressure: The right ventricular systolic pressure is   estimated to be 41mmHg, assuming that the right atrial pressure is   estimated to be 8 mmHg. This is consistent with mild pulmonary   hypertension.  LV Diastolic Function: Stage II diastolic dysfunction    Conclusion: Likely difficult and limited study. Grossly preserved left   ventricular systolic function.  diastolic dysfunction. Left   pleural effusion is noted.                  KIET LAUGHLIN M.D., ATTENDING CARDIOLOGIST  This document has been electronically signed. Feb 6 2018  7:49PM                    < from: Xray Chest 1 View AP/PA (07.20.18 @ 21:42) >    EXAM:  XR CHEST AP OR PA 1V                            PROCEDURE DATE:  07/20/2018          INTERPRETATION:  CLINICAL INFORMATION: Fever, question pneumonia.    A single portable view of the chest was obtained.     Comparison: Chest x-ray 2/5/2018    The mediastinal cardiac silhouette is unremarkable. Prominent aortic   calcifications.    The lungs are clear.     No acute osseous finding.     IMPRESSION:    Clear lungs.                ROCK BLUE M.D., ATTENDING RADIOLOGIST  This document has been electronically signed. Jul 21 2018  8:59AM                < end of copied text > [facility]         Dahiana Bañuelos, Evens, Toney, Terrie, Filiberto Laughlin        [phone] (office)    CHIEF COMPLAINT: Patient is a 83y old  Female who presents with a chief complaint of fever, x 1 week, weakness (23 Jul 2018 10:18)      HPI:  84 yo female 83 year-old woman with a history of hypercholesterolemia, hypertension, rheumatoid arthritis, cerebral aneurysm status post clipping, PAD status post femoropopliteal bypass with subsequent right leg femoropopliteal bypass thrombosis and infected wound, redo bypass with failure, then PCI with failure, paroxysmal atrial fibrillation previously on AC, UGIB, SBO, HFEPF  who presents with one week of subjective fever (measured at home, pt doesn't remember temp), diaphoresis chills, weakness, and lethargy. Being currently treated for Ecoli urosepsis.     The patient was in her usual state of health until one week prior to presentation when she started to feel subjective fevers, chills, and diaphoresis. She then measured her temperature on the day of admission was noted it was "high" though she couldn't remember the exact temperature. She endorsed nocturia and urinary incontinence, though she notes both the nocturia and urinary incontinence are chronic and the frequency remains unchanged from prior.  She denies dysuria, hematuria, urinary frequency, urinary hesitancy.     She denies cough, SOB, CRABTREE, chest pain, palpitations. Of note, she endorses "a few days" of diarrhea that resolved without intervention one week prior to presentation. She denies recent travel, sick contacts.          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  Osteoarthritis  Afib  PVD (peripheral vascular disease)  Neuropathy: (Right lower leg)  H/O cerebral aneurysm repair: brain clips  CVA (cerebral vascular accident): (&quot;Mini-stroke&quot;,1990&#x27;s)  Rheumatoid arthritis  Hyperlipidemia  Hypertension  S/P cataract surgery: (Left eye)  Elective surgery: (&quot;Twisted bowel&quot;, 2014)  Elective surgery: (Exision of cyst on liver, 1985)  S/P ORIF (open reduction internal fixation) fracture: Left hip fx (2012) &amp; R hip fx (2013)  H/O cerebral aneurysm repair: Brain clips (1978(  Renal stone: Cysto stent placement 10/1/2014  PVD (peripheral vascular disease): s/p RLE bypass x 3, most recent 3/2012 Right external iliac to PT bypass w/ PTFE (2012)  S/P FRED (total abdominal hysterectomy): (1987, Hx of &quot;ovarian cancer?&quot;)      SOCIAL HISTORY: No active tobacco, alcohol or illicit drug use    FAMILY HISTORY:  No pertinent family history in first degree relatives   No pertinent family history of CAD    Outpatient medications:    MEDICATIONS  (STANDING):  aspirin enteric coated 81 milliGRAM(s) Oral daily  atorvastatin 40 milliGRAM(s) Oral at bedtime  cefTRIAXone   IVPB 1 Gram(s) IV Intermittent every 24 hours  diltiazem    Tablet 30 milliGRAM(s) Oral every 6 hours  gabapentin 100 milliGRAM(s) Oral three times a day  hydrALAZINE 50 milliGRAM(s) Oral three times a day  metoprolol tartrate 25 milliGRAM(s) Oral every 6 hours  mirtazapine 7.5 milliGRAM(s) Oral at bedtime  pantoprazole    Tablet 40 milliGRAM(s) Oral before breakfast    MEDICATIONS  (PRN):  acetaminophen   Tablet 650 milliGRAM(s) Oral every 8 hours PRN Mild Pain (1 - 3)  docusate sodium 100 milliGRAM(s) Oral two times a day PRN Constipation  polyethylene glycol 3350 17 Gram(s) Oral daily PRN Constipation  senna 2 Tablet(s) Oral at bedtime PRN Constipation      Allergies    No Known Allergies    Intolerances        REVIEW OF SYSTEMS: Is negative for eye, ENT, GI, , allergic, dermatologic, musculoskeletal and neurologic, except as described above.    VITAL SIGNS:   Vital Signs Last 24 Hrs  T(C): 36.4 (23 Jul 2018 04:53), Max: 36.7 (22 Jul 2018 14:02)  T(F): 97.6 (23 Jul 2018 04:53), Max: 98.1 (22 Jul 2018 14:02)  HR: 70 (23 Jul 2018 04:53) (70 - 84)  BP: 169/83 (23 Jul 2018 04:53) (134/77 - 169/83)  BP(mean): --  RR: 16 (23 Jul 2018 04:53) (16 - 18)  SpO2: 96% (23 Jul 2018 04:53) (95% - 97%)    I&O's Summary    22 Jul 2018 07:01  -  23 Jul 2018 07:00  --------------------------------------------------------  IN: 660 mL / OUT: 0 mL / NET: 660 mL        PHYSICAL EXAM:    Constitutional: NAD, awake and alert, well-developed  Eyes:  EOMI, no oral cyanosis, conjunctivae clear, anicteric.  Pulmonary: Non-labored, breath sounds are clear bilaterally, no wheezing, rales or rhonchi  Cardiovascular:  regular S1 and S2. 1/6 SM   Gastrointestinal: Bowel Sounds present, soft, nontender.   Lymph: No peripheral edema.   Neurological: Alert, strength and sensitivity are grossly intact  Skin: No obvious lesions/rashes.   Psych:  Mood & affect appropriate .    LABS: All Labs Reviewed:                        9.4    7.04  )-----------( 297      ( 23 Jul 2018 07:19 )             30.1                         9.3    8.48  )-----------( 282      ( 22 Jul 2018 07:35 )             29.9                         8.6    8.28  )-----------( 231      ( 21 Jul 2018 04:49 )             27.4     23 Jul 2018 07:19    147    |  112    |  25     ----------------------------<  89     3.9     |  28     |  1.20   22 Jul 2018 07:35    148    |  114    |  22     ----------------------------<  90     4.0     |  26     |  1.10   21 Jul 2018 04:49    146    |  114    |  30     ----------------------------<  88     4.1     |  24     |  1.10     Ca    8.5        23 Jul 2018 07:19  Ca    8.2        22 Jul 2018 07:35  Ca    7.6        21 Jul 2018 04:49    TPro  6.2    /  Alb  2.5    /  TBili  0.4    /  DBili  x      /  AST  25     /  ALT  55     /  AlkPhos  257    23 Jul 2018 07:19  TPro  5.9    /  Alb  2.4    /  TBili  0.6    /  DBili  x      /  AST  27     /  ALT  64     /  AlkPhos  249    22 Jul 2018 07:35  TPro  5.4    /  Alb  2.3    /  TBili  0.5    /  DBili  .20    /  AST  35     /  ALT  75     /  AlkPhos  245    21 Jul 2018 04:49    PT/INR - ( 23 Jul 2018 07:19 )   PT: 19.4 sec;   INR: 1.76 ratio               Blood Culture: Organism --  Gram Stain Blood -- Gram Stain --  Specimen Source .Blood Blood-Venous  Culture-Blood --    Organism Blood Culture PCR  Gram Stain Blood -- Gram Stain   Growth in anaerobic bottle: Gram Negative Rods  Specimen Source .Blood Blood-Peripheral  Culture-Blood --    Organism --  Gram Stain Blood -- Gram Stain --  Specimen Source .Blood Blood-Peripheral  Culture-Blood --    Organism Escherichia coli ESBL  Gram Stain Blood -- Gram Stain --  Specimen Source .Urine Clean Catch (Midstream)  Culture-Blood --      < from: IR Aortogram Abd/Eitan Iliofemoral (11.02.17 @ 10:49) >    EXAM:  IR PROCEDURE NON PICC                          EXAM:  US GUIDANCE VASCULAR ACCESS                          EXAM:  SP ANGIO EXTREMITY RT SI                          EXAM:  SP FLUOROSCOPY TO 1 HOUR                          EXAM:  SP AORTOGRM ABD W RUNOFF BI SI                            PROCEDURE DATE:  11/02/2017          INTERPRETATION:  Abdominal aortogram and right femoral angiogram via left   groin    HISTORY: Ischemic right foot with previous right leg bypass procedures   which were found to be occluded and the patient's recent CTA examination.   Catheter angiogram was requested to better visualize small distal vessels.    PROCEDURE: The patient was brought to the interventional suite and placed   in a supine position. Physiologic monitoring and oxygen saturation were   assessed throughout the procedure.  A time out was performed to verify   that the correct patient was present for the appropriate procedure.    The patient's left groin region was prepped and draped in the usual   sterile fashion. Ultrasound evaluation of the left common femoral artery   showed the artery to the patient. A stored sonographic image was sent to   the hospital's PACS system for permanent archiving.    After injection of local anesthesia (Lidocaine 1%), single wall puncture   of the left common femoral artery was performed. Guidewire and catheter   exchanges were made to advance a 4 [place] straight catheter which was   advanced over a guidewire to the level of the abdominal aorta and aorto  iliac angiography was performed. Exchange was then made for a 4 [place]   sheath followed by a 4 [place] Sos selective catheter which was formed in   the upper abdominal aorta and retracted over guidewire to selectively   catheterize the right commoniliac artery and right leg angiography was   performed at multiple levels along the right leg.    The catheter and sheath were removed and digital pressure was applied.   The patient tolerated the procedure well and was discharged from the   interventional suite in stable condition.    INTERPRETATION: Aortic injection showed ectasia of the upper portion of   the infrarenal abdominal aorta with mild ectasia of the common iliac   arteries and occlusion of the right common femoral artery at the level of   the inguinal ligament. Collateral vessels reconstitute an isolated   segment of the mid right superficial femoral artery. There is also   reconstitution of an isolated segment of the above-knee popliteal artery.   Below the right knee, collateral vessels reconstitute the tibioperoneal   trunk via retrograde flow through the anterior tibial artery. The   posterior tibial artery is occluded. At the midpoint of the right leg,   the anterior tibial artery reconstitutes via collaterals and is not seen   beyond the ankle joint.  The peroneal artery is the remaining single   vessel runoff vessel of the right leg. . Above the ankle, it sends a   large collateral to reconstitute the posterior tibial artery above the   ankle. The dorsalis pedis artery was not seen.    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    Thank you for the courtesy of this referral.      < from: TTE Echo Doppler w/o Cont (02.05.18 @ 08:11) >     EXAM:  ECHO TTE W/O CON COMP W/DOPPLR         PROCEDURE DATE:  02/05/2018        INTERPRETATION:  INDICATION: respiratory failure  Referring M.D.:Blanco  Blood Pressure 126/59        Weight (kg) :49     Height (cm):157       BSA (sq m): 1.47  Technician: KL    Dimensions:    LA 3.4       Normal Values: 2.0 - 4.0 cm    Ao 3.0        Normal Values: 2.0 - 3.8 cm  SEPTUM 0.8       Normal Values: 0.6 - 1.2 cm  PWT 0.8       Normal Values: 0.6 - 1.1 cm  LVIDd 3.4         Normal Values: 3.0 - 5.6 cm  LVIDs 2.3         Normal Values: 1.8 - 4.0 cm      OBSERVATIONS:  Technically difficult bedside ICU study. The patient is vented  Mitral Valve: Calcified mitral annulus and mitral valve leaflets with   normal opening. Mild mitral regurgitation.  Aortic Valve/Aorta: Calcified trileaflet aortic valve with normal opening.  Tricuspid Valve: Calcified trileaflet cusp at annulus with normally   opening valve leaflets. Mild tricuspid regurgitation.  Pulmonic Valve: The pulmonic valve is not well visualized. Probably   normal. Mild PI  Left Atrium: Small to moderate left atrial enlargement  Right Atrium: Normal  Left Ventricle: The endocardium is not well-visualized. There appears to   be grossly preserved left ventricular systolic function. There appears to   be concentric left ventricular hypertrophy. The EF is approximately 65%.  Right Ventricle: Normal right ventricular size and function. There is a   device wire noted in the right heart  Pericardium/Pleura: No pericardial effusion noted. Left pleural effusion   noted  Pulmonary/RV Pressure: The right ventricular systolic pressure is   estimated to be 41mmHg, assuming that the right atrial pressure is   estimated to be 8 mmHg. This is consistent with mild pulmonary   hypertension.  LV Diastolic Function: Stage II diastolic dysfunction    Conclusion: Likely difficult and limited study. Grossly preserved left   ventricular systolic function.  diastolic dysfunction. Left   pleural effusion is noted.                  KIET LAUGHLIN M.D., ATTENDING CARDIOLOGIST  This document has been electronically signed. Feb 6 2018  7:49PM                    < from: Xray Chest 1 View AP/PA (07.20.18 @ 21:42) >    EXAM:  XR CHEST AP OR PA 1V                            PROCEDURE DATE:  07/20/2018          INTERPRETATION:  CLINICAL INFORMATION: Fever, question pneumonia.    A single portable view of the chest was obtained.     Comparison: Chest x-ray 2/5/2018    The mediastinal cardiac silhouette is unremarkable. Prominent aortic   calcifications.    The lungs are clear.     No acute osseous finding.     IMPRESSION:    Clear lungs.                ROCK BLUE M.D., ATTENDING RADIOLOGIST  This document has been electronically signed. Jul 21 2018  8:59AM                < end of copied text >  < from: 12 Lead ECG (07.20.18 @ 21:15) >    Ventricular Rate 105 BPM    Atrial Rate 105 BPM    P-R Interval 150 ms    QRS Duration 94 ms    Q-T Interval 344 ms    QTC Calculation(Bezet) 454 ms    P Axis 31 degrees    R Axis -33 degrees    T Axis 52 degrees    Diagnosis Line Sinus tachycardia  Left axis deviation  Minimal voltage criteria for LVH, may be normal variant  Abnormal ECG    Confirmed by ovidio De Los Santos ([id number]) on 7/21/2018 5:43:44 PM    < end of copied text >

## 2018-07-23 NOTE — CONSULT NOTE ADULT - SUBJECTIVE AND OBJECTIVE BOX
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  Osteoarthritis  Afib  PVD (peripheral vascular disease)  Neuropathy  Gout  H/O cerebral aneurysm repair  CVA (cerebral vascular accident)  Rheumatoid arthritis  Asthma  Hyperlipidemia  Hypertension  S/P cataract surgery  Elective surgery  Elective surgery  S/P ORIF (open reduction internal fixation) fracture  H/O cerebral aneurysm repair  Renal stone  PVD (peripheral vascular disease)  S/P FRED (total abdominal hysterectomy)     HPI:  84 yo female w/ PMH of afib on coumadin, CVA and hx of aneurysm repair, HTN, HLD, mitral valve prolapse, PVD s/p stents x 3 (RLE), rheumatoid arthritis, who presents with one week of subjective fever (measured at home, pt doesn't remember temp), diaphoresis chills, weakness, and lethargy.     The patient was in her usual state of health until one week prior to presentation when she started to feel subjective fevers, chills, and diaphoresis. She then measured her temperature on the day of admission was noted it was "high" though she couldn't remember the exact temperature. She endorsed nocturia and urinary incontinence, though she notes both the nocturia and urinary incontinence are chronic and the frequency remains unchanged from prior.  She denies dysuria, hematuria, urinary frequency, urinary hesitancy.     She denies cough, SOB, CRABTREE, chest pain, palpitations. Of note, she endorses "a few days" of diarrhea that resolved without intervention one week prior to presentation. She denies recent travel, sick contacts.     In the ED, vitals were T 102   /84 RR 19 and Sp02 95%. She received 3 L NS bolus and ceftriaxone. Labs notable for WBC 11.52 with 80.1% PMNs, Allk Phos 326, AST 59, , Hbg 9.8 (baseline), UA with small leukocyte esterase, 6-10 WBCs, moderate bacteria,  CT revealed distended gallbladder containing stones, mild intrahepatic bile duct dilatation with a common bile duct stent in place, US gallbladder revealed cholelithiasis without sonographic evidence of acute cholecystitis. EKG sinus with incomplete right bundle branch block (2018 03:04)      No Known Allergies      acetaminophen   Tablet 650 milliGRAM(s) Oral every 8 hours PRN  aspirin enteric coated 81 milliGRAM(s) Oral daily  atorvastatin 40 milliGRAM(s) Oral at bedtime  cefTRIAXone   IVPB 1 Gram(s) IV Intermittent every 24 hours  diltiazem    Tablet 30 milliGRAM(s) Oral every 6 hours  docusate sodium 100 milliGRAM(s) Oral two times a day PRN  gabapentin 100 milliGRAM(s) Oral three times a day  hydrALAZINE 50 milliGRAM(s) Oral three times a day  metoprolol tartrate 25 milliGRAM(s) Oral every 6 hours  mirtazapine 7.5 milliGRAM(s) Oral at bedtime  pantoprazole    Tablet 40 milliGRAM(s) Oral before breakfast  polyethylene glycol 3350 17 Gram(s) Oral daily PRN  senna 2 Tablet(s) Oral at bedtime PRN        FAMILY HISTORY:  No pertinent family history in first degree relatives        Review of Systems:    General:  No wt loss, fevers, chills, night sweats,fatigue,   Eyes:  Good vision, no reported pain  ENT:  No sore throat, pain, runny nose, dysphagia  CV:  No pain, palpitatioins, hypo/hypertension  Resp:  No dyspnea, cough, tachypnea, wheezing  :  No pain, bleeding, incontinence, nocturia  Muscle:  No pain, weakness  Neuro:  No weakness, tingling, memory problems  Psych:  No fatigue, insomnia, mood problems, depression  Endocrine:  No polyuria, polydypsia, cold/heat intolerance  Heme:  No petechiae, ecchymosis, easy bruisability  Skin:  No rash, tattoos, scars, edema    Relevant Family History:       Relevant Social History:       Physical Exam:    Vital Signs:  Vital Signs Last 24 Hrs  T(C): 36.4 (2018 04:53), Max: 36.7 (2018 14:02)  T(F): 97.6 (2018 04:53), Max: 98.1 (2018 14:02)  HR: 70 (2018 04:53) (70 - 84)  BP: 169/83 (2018 04:53) (134/77 - 169/83)  BP(mean): --  RR: 16 (2018 04:53) (16 - 18)  SpO2: 96% (2018 04:53) (95% - 97%)  Daily     Daily Weight in k.2 (2018 11:23)    General:  Appears stated age, well-groomed, well-nourished, no distress  HEENT:  NC/AT,  conjunctivae clear and pink, no thyromegaly, nodules, adenopathy, no JVD  Chest:  Full & symmetric excursion, no increased effort, breath sounds clear  Cardiovascular:  Regular rhythm, S1, S2, no murmur/rub/S3/S4, no abdominal bruit, no edema  Abdomen:  Soft, non-tender, non-distended, normoactive bowel sounds,  no masses ,no hepatosplenomeagaly, no signs of chronic liver disease  Extremities:  no cyanosis,clubbing or edema  Skin:  No rash/erythema/ecchymoses/petechiae/wounds/abscess/warm/dry  Neuro/Psych:  Alert, oriented, no asterixis, no tremor, no encephalopathy    Laboratory:                            9.4    7.04  )-----------( 297      ( 2018 07:19 )             30.1     07    147<H>  |  112<H>  |  25<H>  ----------------------------<  89  3.9   |  28  |  1.20    Ca    8.5      2018 07:19    TPro  6.2  /  Alb  2.5<L>  /  TBili  0.4  /  DBili  x   /  AST  25  /  ALT  55  /  AlkPhos  257<H>      LIVER FUNCTIONS - ( 2018 07:19 )  Alb: 2.5 g/dL / Pro: 6.2 g/dL / ALK PHOS: 257 U/L / ALT: 55 U/L / AST: 25 U/L / GGT: x           PT/INR - ( 2018 07:19 )   PT: 19.4 sec;   INR: 1.76 ratio               Imaging:

## 2018-07-23 NOTE — DISCHARGE NOTE ADULT - MEDICATION SUMMARY - MEDICATIONS TO TAKE
I will START or STAY ON the medications listed below when I get home from the hospital:    aspirin 81 mg oral delayed release tablet  -- 1 tab(s) by mouth once a day  -- Indication: For PVD (peripheral vascular disease)    acetaminophen 325 mg oral tablet  -- 2 tab(s) by mouth every 6 hours, As needed, Mild Pain (1 - 3)  -- Indication: For osteoarthritis    dilTIAZem 30 mg oral tablet  -- 1 tab(s) by mouth every 6 hours  -- Indication: For Afib    warfarin 3 mg oral tablet  -- 1 tab(s) by mouth once a day  -- Indication: For Afib    gabapentin 100 mg oral capsule  --  by mouth 3 times a day  -- Indication: For Neuropathy    mirtazapine 7.5 mg oral tablet  -- 1 tab(s) by mouth once a day (at bedtime)  -- Indication: For Depression    atorvastatin 40 mg oral tablet  -- 1 tab(s) by mouth once a day (at bedtime)  -- Indication: For Hyperlipidemia    metoprolol tartrate 25 mg oral tablet  -- 1 tab(s) by mouth every 6 hours  -- Indication: For Afib    nystatin 100,000 units/g topical powder  -- 1 application on skin 2 times a day  -- Indication: For rash    docusate sodium 100 mg oral capsule  -- 1 cap(s) by mouth 2 times a day, As needed, Constipation  -- Indication: For Constipation    polyethylene glycol 3350 oral powder for reconstitution  -- 17 gram(s) by mouth once a day, As needed, Constipation  -- Indication: For Constipation    senna oral tablet  -- 2 tab(s) by mouth once a day (at bedtime)  -- Indication: For Constipation    pantoprazole 40 mg oral delayed release tablet  -- 1 tab(s) by mouth once a day (before a meal)  -- Indication: For Acid reflux    hydrALAZINE 50 mg oral tablet  -- 1 tab(s) by mouth every 8 hours  -- Indication: For Hypertension    multivitamin  --   once a day  -- Indication: For Nutritional supplement I will START or STAY ON the medications listed below when I get home from the hospital:    aspirin 81 mg oral delayed release tablet  -- 1 tab(s) by mouth once a day  -- Indication: For PVD (peripheral vascular disease)    acetaminophen 325 mg oral tablet  -- 2 tab(s) by mouth every 6 hours, As needed, Mild Pain (1 - 3)  -- Indication: For osteoarthritis    dilTIAZem 120 mg/24 hours oral capsule, extended release  -- 1 cap(s) by mouth once a day  -- Indication: For Afib    warfarin 3 mg oral tablet  -- 1 tab(s) by mouth once a day  -- Indication: For Afib    gabapentin 100 mg oral capsule  --  by mouth 3 times a day  -- Indication: For Neuropathy    mirtazapine 7.5 mg oral tablet  -- 1 tab(s) by mouth once a day (at bedtime)  -- Indication: For Depression    atorvastatin 40 mg oral tablet  -- 1 tab(s) by mouth once a day (at bedtime)  -- Indication: For Hyperlipidemia    metoprolol succinate 100 mg oral tablet, extended release  -- 1 tab(s) by mouth once a day  -- Indication: For Afib    nystatin 100,000 units/g topical powder  -- 1 application on skin 2 times a day  -- Indication: For Home Med    docusate sodium 100 mg oral capsule  -- 1 cap(s) by mouth 2 times a day, As needed, Constipation  -- Indication: For Constipation as needed     polyethylene glycol 3350 oral powder for reconstitution  -- 17 gram(s) by mouth once a day, As needed, Constipation  -- Indication: For Constipation as needed     senna oral tablet  -- 2 tab(s) by mouth once a day (at bedtime)  -- Indication: For Constipation    pantoprazole 40 mg oral delayed release tablet  -- 1 tab(s) by mouth once a day (before a meal)  -- Indication: For Acid reflux    hydrALAZINE 50 mg oral tablet  -- 1 tab(s) by mouth every 8 hours  -- Indication: For Hypertension    multivitamin  --   once a day  -- Indication: For Nutritional supplement

## 2018-07-23 NOTE — CONSULT NOTE ADULT - ASSESSMENT
84 yo female w/ PMH of afib on coumadin, CVA and hx of aneurysm repair, HTN, HLD, mitral valve prolapse, PVD s/p stents x 3 (RLE), rheumatoid arthritis, who presents with one week of subjective fever (measured at home, pt doesn't remember temp), diaphoresis chills, weakness, and lethargy.      EKG shows no signs of ischemia     chronic atrial fibrillation, rate controlled. continue coumadin, metoprolol tartrate 25 mg PO daily, diltiazem 30 mg PO daily.      No signs of volume overload     HTN: continue metoprolol, diltiazem, 84 yo female w/ PMH of PAF on AC, CVA and hx of aneurysm repair, HTN, HLD, mitral valve prolapse, PAD, RA, HFPEF p/w urosepsis  - No signs of significant ischemia or volume overload.   - Cont Abx per ID.   - Awaiting GI workup. F/U HIDA  - No need to repeat echo at this time.   - Monitor and replete electrolytes. Keep K>4.0 and Mg>2.0.   - HTN uncontrolled. I would increase her Hydralazine to 75mg Qday  - PAF: Cont cardizem and metoprolol at current doses. 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 Has not had any issues on this regiment.   - Dose Coumadin. Goal INR 2-3. Needs Daily INRs. Monitor H/H and for clinical signs of bleeding.   - Appears compensated from HF POV.   - Further cardiac workup will depend on clinical course.   - All other workup per primary team. Will followup. 84 yo female w/ PMH of PAF on AC, CVA and hx of aneurysm repair, HTN, HLD, mitral valve prolapse, PAD, RA, HFPEF p/w urosepsis  - No signs of significant ischemia or volume overload.   - Cont Abx per ID.   - Awaiting GI workup. F/U HIDA  - No need to repeat echo at this time.   - Monitor and replete electrolytes. Keep K>4.0 and Mg>2.0.   - HTN uncontrolled. I would increase her Hydralazine to 75mg Qday  - PAF: Cont cardizem and metoprolol at current doses. 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 Has not had any issues on this regiment.   - Dose Coumadin. Goal INR 2-3. Needs Daily INRs. Monitor H/H and for clinical signs of bleeding.   - PAD: currently disease is severe and not amenable to revascularization. Cont with walking therapy and ASA.   - Appears compensated from HF POV.   - Further cardiac workup will depend on clinical course.   - All other workup per primary team. Will followup.

## 2018-07-24 ENCOUNTER — TRANSCRIPTION ENCOUNTER (OUTPATIENT)
Age: 83
End: 2018-07-24

## 2018-07-24 DIAGNOSIS — D64.9 ANEMIA, UNSPECIFIED: ICD-10-CM

## 2018-07-24 DIAGNOSIS — R79.89 OTHER SPECIFIED ABNORMAL FINDINGS OF BLOOD CHEMISTRY: ICD-10-CM

## 2018-07-24 DIAGNOSIS — I71.4 ABDOMINAL AORTIC ANEURYSM, WITHOUT RUPTURE: ICD-10-CM

## 2018-07-24 DIAGNOSIS — R78.81 BACTEREMIA: ICD-10-CM

## 2018-07-24 LAB
ALBUMIN SERPL ELPH-MCNC: 2.6 G/DL — LOW (ref 3.3–5)
ALP SERPL-CCNC: 248 U/L — HIGH (ref 40–120)
ALT FLD-CCNC: 45 U/L — SIGNIFICANT CHANGE UP (ref 12–78)
ANION GAP SERPL CALC-SCNC: 8 MMOL/L — SIGNIFICANT CHANGE UP (ref 5–17)
AST SERPL-CCNC: 20 U/L — SIGNIFICANT CHANGE UP (ref 15–37)
BILIRUB SERPL-MCNC: 0.4 MG/DL — SIGNIFICANT CHANGE UP (ref 0.2–1.2)
BUN SERPL-MCNC: 29 MG/DL — HIGH (ref 7–23)
CALCIUM SERPL-MCNC: 8.4 MG/DL — LOW (ref 8.5–10.1)
CHLORIDE SERPL-SCNC: 111 MMOL/L — HIGH (ref 96–108)
CO2 SERPL-SCNC: 26 MMOL/L — SIGNIFICANT CHANGE UP (ref 22–31)
CREAT SERPL-MCNC: 1.2 MG/DL — SIGNIFICANT CHANGE UP (ref 0.5–1.3)
GLUCOSE SERPL-MCNC: 89 MG/DL — SIGNIFICANT CHANGE UP (ref 70–99)
HCT VFR BLD CALC: 30.3 % — LOW (ref 34.5–45)
HGB BLD-MCNC: 9.5 G/DL — LOW (ref 11.5–15.5)
INR BLD: 1.62 RATIO — HIGH (ref 0.88–1.16)
MCHC RBC-ENTMCNC: 26 PG — LOW (ref 27–34)
MCHC RBC-ENTMCNC: 31.4 GM/DL — LOW (ref 32–36)
MCV RBC AUTO: 82.8 FL — SIGNIFICANT CHANGE UP (ref 80–100)
NRBC # BLD: 0 /100 WBCS — SIGNIFICANT CHANGE UP (ref 0–0)
PLATELET # BLD AUTO: 297 K/UL — SIGNIFICANT CHANGE UP (ref 150–400)
POTASSIUM SERPL-MCNC: 4.1 MMOL/L — SIGNIFICANT CHANGE UP (ref 3.5–5.3)
POTASSIUM SERPL-SCNC: 4.1 MMOL/L — SIGNIFICANT CHANGE UP (ref 3.5–5.3)
PROT SERPL-MCNC: 6.3 G/DL — SIGNIFICANT CHANGE UP (ref 6–8.3)
PROTHROM AB SERPL-ACNC: 17.8 SEC — HIGH (ref 9.8–12.7)
RBC # BLD: 3.66 M/UL — LOW (ref 3.8–5.2)
RBC # FLD: 17.6 % — HIGH (ref 10.3–14.5)
SODIUM SERPL-SCNC: 145 MMOL/L — SIGNIFICANT CHANGE UP (ref 135–145)
WBC # BLD: 6.4 K/UL — SIGNIFICANT CHANGE UP (ref 3.8–10.5)
WBC # FLD AUTO: 6.4 K/UL — SIGNIFICANT CHANGE UP (ref 3.8–10.5)

## 2018-07-24 PROCEDURE — 99232 SBSQ HOSP IP/OBS MODERATE 35: CPT

## 2018-07-24 PROCEDURE — 99233 SBSQ HOSP IP/OBS HIGH 50: CPT

## 2018-07-24 RX ORDER — WARFARIN SODIUM 2.5 MG/1
3 TABLET ORAL ONCE
Qty: 0 | Refills: 0 | Status: DISCONTINUED | OUTPATIENT
Start: 2018-07-24 | End: 2018-07-24

## 2018-07-24 RX ADMIN — Medication 50 MILLIGRAM(S): at 13:56

## 2018-07-24 RX ADMIN — ATORVASTATIN CALCIUM 40 MILLIGRAM(S): 80 TABLET, FILM COATED ORAL at 21:50

## 2018-07-24 RX ADMIN — Medication 25 MILLIGRAM(S): at 12:06

## 2018-07-24 RX ADMIN — Medication 25 MILLIGRAM(S): at 00:20

## 2018-07-24 RX ADMIN — ERTAPENEM SODIUM 100 MILLIGRAM(S): 1 INJECTION, POWDER, LYOPHILIZED, FOR SOLUTION INTRAMUSCULAR; INTRAVENOUS at 13:56

## 2018-07-24 RX ADMIN — Medication 25 MILLIGRAM(S): at 18:42

## 2018-07-24 RX ADMIN — GABAPENTIN 100 MILLIGRAM(S): 400 CAPSULE ORAL at 05:34

## 2018-07-24 RX ADMIN — Medication 50 MILLIGRAM(S): at 21:50

## 2018-07-24 RX ADMIN — Medication 25 MILLIGRAM(S): at 05:34

## 2018-07-24 RX ADMIN — GABAPENTIN 100 MILLIGRAM(S): 400 CAPSULE ORAL at 21:50

## 2018-07-24 RX ADMIN — Medication 81 MILLIGRAM(S): at 12:06

## 2018-07-24 RX ADMIN — PANTOPRAZOLE SODIUM 40 MILLIGRAM(S): 20 TABLET, DELAYED RELEASE ORAL at 06:38

## 2018-07-24 RX ADMIN — GABAPENTIN 100 MILLIGRAM(S): 400 CAPSULE ORAL at 13:56

## 2018-07-24 RX ADMIN — MIRTAZAPINE 7.5 MILLIGRAM(S): 45 TABLET, ORALLY DISINTEGRATING ORAL at 21:50

## 2018-07-24 RX ADMIN — Medication 50 MILLIGRAM(S): at 05:34

## 2018-07-24 NOTE — PROGRESS NOTE ADULT - PROBLEM SELECTOR PLAN 1
afebrile, no leukocytosis, no ttp on abd exam  pt has hx of ercp and biliary sepsis  now with e coli sepsis  hida scan neg for acute rox  need for further w/u to be dw attg  ?surgical eval  monitor lfts, downtrending  repeat bld cxs ngtd  comt abx  f/u ID recs  will follow afebrile, no leukocytosis, no ttp on abd exam  pt has hx of ercp and biliary sepsis  now with e coli sepsis  hida scan neg for acute rox; abd us noted  need for further w/u to be dw attg  ?surgical eval  monitor lfts, downtrending  repeat bld cxs ngtd  comt abx  f/u ID recs  will follow

## 2018-07-24 NOTE — PROGRESS NOTE ADULT - SUBJECTIVE AND OBJECTIVE BOX
CARITO CONSTANTINO is a 83yFemale , patient examined and chart reviewed.      INTERVAL HPI/ OVERNIGHT EVENTS:   Afebrile. No events.    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  Osteoarthritis  Afib  PVD (peripheral vascular disease)  Neuropathy: (Right lower leg)  H/O cerebral aneurysm repair: brain clips  CVA (cerebral vascular accident): (&quot;Mini-stroke&quot;,1990&#x27;s)  Rheumatoid arthritis  Hyperlipidemia  Hypertension  S/P cataract surgery: (Left eye)  Elective surgery: (&quot;Twisted bowel&quot;, 2014)  Elective surgery: (Exision of cyst on liver, 1985)  S/P ORIF (open reduction internal fixation) fracture: Left hip fx (2012) &amp; R hip fx (2013)  H/O cerebral aneurysm repair: Brain clips (1978(  Renal stone: Cysto stent placement 10/1/2014  PVD (peripheral vascular disease): s/p RLE bypass x 3, most recent 3/2012 Right external iliac to PT bypass w/ PTFE (2012)  S/P FRED (total abdominal hysterectomy): (1987, Hx of &quot;ovarian cancer?&quot;)    For details regarding the patient's social history, family history, and other miscellaneous elements, please refer the initial infectious diseases consultation and/or the admitting history and physical examination for this admission.    ROS:  CONSTITUTIONAL:  Negative fever or chills, feels well, good appetite  EYES:  Negative  blurry vision or double vision  CARDIOVASCULAR:  Negative for chest pain or palpitations  RESPIRATORY:  Negative for cough, wheezing, or SOB   GASTROINTESTINAL:  Negative for nausea, vomiting, diarrhea, constipation, or abdominal pain  GENITOURINARY:  Negative frequency, urgency or dysuria  NEUROLOGIC:  No headache, confusion, dizziness, lightheadedness  All other systems were reviewed and are negative       Current inpatient medications :    ANTIBIOTICS/RELEVANT:  ertapenem  IVPB      ertapenem  IVPB 500 milliGRAM(s) IV Intermittent every 24 hours      acetaminophen   Tablet 650 milliGRAM(s) Oral every 8 hours PRN  aspirin enteric coated 81 milliGRAM(s) Oral daily  atorvastatin 40 milliGRAM(s) Oral at bedtime  diltiazem    Tablet 30 milliGRAM(s) Oral every 6 hours  docusate sodium 100 milliGRAM(s) Oral two times a day PRN  gabapentin 100 milliGRAM(s) Oral three times a day  hydrALAZINE 50 milliGRAM(s) Oral three times a day  metoprolol tartrate 25 milliGRAM(s) Oral every 6 hours  mirtazapine 7.5 milliGRAM(s) Oral at bedtime  pantoprazole    Tablet 40 milliGRAM(s) Oral before breakfast  polyethylene glycol 3350 17 Gram(s) Oral daily PRN  senna 2 Tablet(s) Oral at bedtime PRN      Objective:    T(C): 36.6 (07-24-18 @ 13:48), Max: 36.6 (07-23-18 @ 20:18)  HR: 73 (07-24-18 @ 13:48) (73 - 81)  BP: 110/69 (07-24-18 @ 13:48) (110/69 - 154/82)  RR: 18 (07-24-18 @ 13:48) (18 - 18)  SpO2: 95% (07-24-18 @ 13:48) (95% - 97%)  Wt(kg): --      Physical Exam:  General: no acute distress  Eyes: sclera anicteric, pupils equal and reactive to light  ENMT: buccal mucosa moist, pharynx not injected  Neck: supple, trachea midline  Lungs: clear, no wheeze/rhonchi  Cardiovascular: regular rate and rhythm, S1 S2  Abdomen: soft, nontender, no organomegaly present, bowel sounds normal  Neurological: alert and oriented x3, Cranial Nerves II-XII grossly intact  Skin: no increased ecchymosis/petechiae/purpura  Lymph Nodes: no palpable cervical/supraclavicular lymph nodes enlargements  Extremities: no cyanosis/clubbing/edema      LABS:                          9.5    6.40  )-----------( 297      ( 24 Jul 2018 07:52 )             30.3       07-24    145  |  111<H>  |  29<H>  ----------------------------<  89  4.1   |  26  |  1.20    Ca    8.4<L>      24 Jul 2018 07:52    TPro  6.3  /  Alb  2.6<L>  /  TBili  0.4  /  DBili  x   /  AST  20  /  ALT  45  /  AlkPhos  248<H>  07-24      PT/INR - ( 24 Jul 2018 07:52 )   PT: 17.8 sec;   INR: 1.62 ratio           MICROBIOLOGY:    Culture - Blood (collected 22 Jul 2018 10:30)  Source: .Blood Blood-Venous  Preliminary Report (23 Jul 2018 11:01):    No growth to date.    Culture - Blood (07.21.18 @ 10:37)    -  Trimethoprim/Sulfamethoxazole: R >2/38    -  Gentamicin: S <=1    -  Imipenem: S <=1    -  Levofloxacin: R >4    -  Meropenem: S <=1    Gram Stain:   Growth in anaerobic bottle: Gram Negative Rods    -  Amikacin: S <=8    -  Ampicillin: R >16 These ampicillin results predict results for amoxicillin    -  Aztreonam: R >16    -  Cefazolin: R >16    -  Cefoxitin: S 8    -  Ciprofloxacin: R >2    -  Ertapenem: S <=0.5    -  Cefepime: R >16    -  Piperacillin/Tazobactam: R <=8    -  Tobramycin: R >8    -  Escherichia coli: Detec    -  Ampicillin/Sulbactam: R >16/8    -  Ceftriaxone: R >32 Enterobacter, Citrobacter, and Serratia may develop resistance during prolonged therapy    Specimen Source: .Blood Blood-Peripheral    Organism: Blood Culture PCR    Organism: Escherichia coli ESBL    Culture Results:   Growth in anaerobic bottle: Escherichia coli ESBL  "Due to technical problems, Proteus sp. will Not be reported as part of  the BCID panel until further notice"  ***Blood Panel PCR results on this specimen are available  approximately 3 hours afterthe Gram stain result.***  Gram stain, PCR, and/or culture results may not always  correspond due to difference in methodologies.  ************************************************************  This PCR assay was performed using [employer].  The following targets are tested for: Enterococcus,  vancomycin resistant enterococci, Listeria monocytogenes,  coagulase negative staphylococci, S. aureus,  methicillin resistant S. aureus, Streptococcus agalactiae  (Group B), S. pneumoniae, S. pyogenes (Group A),  Acinetobacter baumannii, Enterobacter cloacae, E. coli,  Klebsiella oxytoca, K. pneumoniae, Proteus sp.,  Serratia marcescens, Haemophilus influenzae,  Neisseria meningitidis, Pseudomonas aeruginosa, Candida  albicans, C. glabrata, C krusei, C parapsilosis,  C. tropicalis and the KPC resistance gene.    Organism Identification: Blood Culture PCR  Escherichia coli ESBL    Method Type: PCR    Method Type: KWABENA    RADIOLOGY & ADDITIONAL STUDIES:      EXAM:  NM HEPATOBILIARY IMG                            PROCEDURE DATE:  07/23/2018          INTERPRETATION:  RADIOPHARMACEUTICAL: 3.0 mCi Tc-99m-Mebrofenin, I.V.    CLINICAL STATEMENT: 86-year-old female with fever and abdominal pain, and   cholelithiasis and distended gallbladder on recent CT.    TECHNIQUE:  Dynamic imaging of the anterior abdomen was performed for 75   minutes following injection of radiotracer. Sincalide, 1 mcg in 50 cc   normal saline solution was administered intravenouslyat 60 minutes into   the study. Static images of the abdomen in the anterior, right lateral,   and BORJAS views were obtained immediately thereafter.    Study is compared with hepatobiliary scan from 10/2/2014.    FINDINGS: There is prompt, homogeneous uptake of radiotracer by the   hepatocytes. Activity is first seen in the gallbladder at 30 minutes   minutes and in the bowel at 70 minutes, 10 minutes after sincalide   administration. There is good clearance of activity from the liver by the   end of the study.    IMPRESSION: Normal hepatobiliary scan.    No evidence of acute cholecystitis.    This is not significantly changed as compared to hepatobiliary scan from   10/2/2014.        Assessment :  84 yo female w/ PMH of afib on coumadin, CVA and hx of aneurysm repair, HTN, HLD, mitral   valve prolapse, PVD s/p stents x 3 (RLE), Right 2nd to OM sp amputation RA admitted with   E coli  septicemia sec UTI  Ucx with Ecoli ESBL  Seen by GI due to hx of biliary stent due to CBD stone and apparently pt did not follow up  HIDA neg for acute rox  Sp fever    Plan :   Cont Invanz x 10 days  Fu repeat blood cultures  Fu final cultures  Trend LFTs  Increase activity  GI following poss ERCP     Continue with present regiment.  Appropriate use of antibiotics and adverse effects reviewed.      I have discussed the above plan of care with patient/ family in detail. They expressed understanding of the  treatment plan . Risks, benefits and alternatives discussed in detail. I have asked if they have any questions or concerns and appropriately addressed them to the best of my ability .    > 35 minutes were spent in direct patient care reviewing notes, medications ,labs data/ imaging , discussion with multidisciplinary team.    Thank you for allowing me to participate in care of your patient .    Cynthia Jaimes MD  Infectious Disease  [phone]

## 2018-07-24 NOTE — PROGRESS NOTE ADULT - PROBLEM SELECTOR PLAN 4
- HIDA negative   - lfts trending down. continue to monitor   - gi following - HIDA negative , h/o biliary stent due to cbd stone   - lfts trending down. continue to monitor may require ercp  - gi following - HIDA negative , h/o biliary stent due to cbd stone   - lfts trending down. continue to monitor . ERCP tomorrow. NPO after midnight  - gi following

## 2018-07-24 NOTE — CONSULT NOTE ADULT - ASSESSMENT
83 y.o. female with a long hx. of PAD presents with RLE claudication at short distances. She has chronic occlusions of multiple right leg bypass grafts. She has no limb threatening ischemia at this time. There are no other vascular procedures that can be done to her right leg. I  would continue daily Coumadin. If her disease progresses she will need a future amputation of her right leg.

## 2018-07-24 NOTE — PROGRESS NOTE ADULT - PROBLEM SELECTOR PLAN 1
ESBL bacteremia. switched to invanz yesterday   afebrile , no leukocytosis.   repeat blood culture ngtd   ID Dr. armendariz following. will f/u recs for abx course ESBL bacteremia. switched to invanz yesterday   afebrile , no leukocytosis.   repeat blood culture ngtd   ID Dr. armendariz following. will f/u recs for abx course. continue invanz x 10d, f/u repeat blood cx

## 2018-07-24 NOTE — CONSULT NOTE ADULT - SUBJECTIVE AND OBJECTIVE BOX
History of Present Illness:  83y Female with a history of PAD and multiple right lower extremity bypasses was admitted with a UTI. She has a hx of chronic occlusions of right leg bypasses and admits to right thigh and calf claudication at short distances. She denies ischemic pedal pain at rest or digital ulcers. She normally follows up with her vascular surgeon Dr. Juan Parham. She is on Coumadin for atrial fibrillation.    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  Osteoarthritis  Afib  PVD (peripheral vascular disease)  Neuropathy: (Right lower leg)  H/O cerebral aneurysm repair: brain clips  CVA (cerebral vascular accident): (&quot;Mini-stroke&quot;,1990&#x27;s)  Rheumatoid arthritis  Hyperlipidemia  Hypertension  S/P cataract surgery: (Left eye)  Elective surgery: (&quot;Twisted bowel&quot;, 2014)  Elective surgery: (Exision of cyst on liver, 1985)  S/P ORIF (open reduction internal fixation) fracture: Left hip fx (2012) &amp; R hip fx (2013)  H/O cerebral aneurysm repair: Brain clips (1978(  Renal stone: Cysto stent placement 10/1/2014  PVD (peripheral vascular disease): s/p RLE bypass x 3, most recent 3/2012 Right external iliac to PT bypass w/ PTFE (2012)  S/P FRED (total abdominal hysterectomy): (1987, Hx of &quot;ovarian cancer?&quot;)      Allergies    No Known Allergies    Intolerances        MEDICATIONS  (STANDING):  aspirin enteric coated 81 milliGRAM(s) Oral daily  atorvastatin 40 milliGRAM(s) Oral at bedtime  diltiazem    Tablet 30 milliGRAM(s) Oral every 6 hours  ertapenem  IVPB      ertapenem  IVPB 500 milliGRAM(s) IV Intermittent every 24 hours  gabapentin 100 milliGRAM(s) Oral three times a day  hydrALAZINE 50 milliGRAM(s) Oral three times a day  metoprolol tartrate 25 milliGRAM(s) Oral every 6 hours  mirtazapine 7.5 milliGRAM(s) Oral at bedtime  pantoprazole    Tablet 40 milliGRAM(s) Oral before breakfast    MEDICATIONS  (PRN):  acetaminophen   Tablet 650 milliGRAM(s) Oral every 8 hours PRN Mild Pain (1 - 3)  docusate sodium 100 milliGRAM(s) Oral two times a day PRN Constipation  polyethylene glycol 3350 17 Gram(s) Oral daily PRN Constipation  senna 2 Tablet(s) Oral at bedtime PRN Constipation      Social History:  Smoking History: past hx  Alcohol Use: social    REVIEW OF SYSTEMS:  CONSTITUTIONAL: No weakness, fevers or chills  EYES/ENT: No visual changes;  No vertigo or throat pain   NECK: No pain or stiffness  RESPIRATORY: No cough, wheezing, hemoptysis; No shortness of breath  CARDIOVASCULAR: No chest pain or palpitations  GASTROINTESTINAL: No abdominal or epigastric pain. No nausea, vomiting, or hematemesis; No diarrhea or constipation. No melena or hematochezia.  GENITOURINARY: No dysuria, frequency or hematuria  NEUROLOGICAL: No numbness or weakness  SKIN: No itching, burning, rashes, or lesions   Vascular:  No lower extremity claudication, pedal rest pain or digital ulcers  All other review of systems is negative unless indicated above.    PHYSICAL EXAM:  General:  On exam, the patient is alert nontoxic appearing Female in no acute distress.  Vital Signs Last 24 Hrs  T(C): 36.3 (24 Jul 2018 05:11), Max: 36.6 (23 Jul 2018 20:18)  T(F): 97.4 (24 Jul 2018 05:11), Max: 97.9 (23 Jul 2018 20:18)  HR: 73 (24 Jul 2018 05:11) (73 - 81)  BP: 154/82 (24 Jul 2018 05:11) (115/71 - 154/82)  BP(mean): --  RR: 18 (24 Jul 2018 05:11) (17 - 18)  SpO2: 97% (24 Jul 2018 05:11) (97% - 97%)    Neck:  4+/4+ bilateral carotid pulses; no carotid bruit, no palpable cervical masses.  Heart:  irregular, no murmurs, rubs or gallops.    Lungs:  decreased BS at both bases  Chest:  No chest wall deformities  Symmetrical chest expansion.   Abdomen: Soft and nontender.  No palpable masses.  No rebound, guarding or rigidity.  Extremities:  Feet are warm, pink with normal capillary refill times.  There are no digital ulcers or heel decubiti.  The calf and thigh muscles are soft and nontender.  There are no palpable cords or limb cellulitis.  Sienna's sign  is negative bilaterally.  There is no lower extremity edema, cyanosis, or rubor.  On examination of the peripheral pulses:  Left leg femoral pulse is 4/4    , popliteal pulse is 3/4   ,PT Pulse is 0  , DP Pulse is 3/4   Right leg femoral pulse is  0  ,popliteal pulse is 0  , PT Pulse is  0 DP Pulse is 0  Neurological:  There are no motor or sensory deficits in either lower extremity.                          9.4    7.04  )-----------( 297      ( 23 Jul 2018 07:19 )             30.1     07-23    147<H>  |  112<H>  |  25<H>  ----------------------------<  89  3.9   |  28  |  1.20    Ca    8.5      23 Jul 2018 07:19    TPro  6.2  /  Alb  2.5<L>  /  TBili  0.4  /  DBili  x   /  AST  25  /  ALT  55  /  AlkPhos  257<H>  07-23        PT/INR - ( 23 Jul 2018 07:19 )   PT: 19.4 sec;   INR: 1.76 ratio             Radiology:

## 2018-07-24 NOTE — PROGRESS NOTE ADULT - ASSESSMENT
84 yo female w/ PMH of PAF on AC, CVA and hx of aneurysm repair, HTN, HLD, mitral valve prolapse, PAD, RA, HFPEF p/w urosepsis    - No signs of significant ischemia or volume overload.   - Cont Abx per ID.   - Awaiting GI workup. F/U HIDA  - No need to repeat echo at this time.   - Monitor and replete electrolytes. Keep K>4.0 and Mg>2.0.   - HTN better controlled, cont present meds  - no acute arrhythmia  - h/p PAF; Cont cardizem and metoprolol at current doses. 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 Has not had any issues on these meds  - Dose Coumadin. Goal INR 2-3. Needs Daily INRs. Monitor H/H and for clinical signs of bleeding.   - PAD: currently disease is severe and not amenable to revascularization.   - surgical eval noted, no plan for procedure at this time    - Further cardiac workup will depend on clinical course.   - All other workup per primary team. Will follow

## 2018-07-24 NOTE — PROGRESS NOTE ADULT - SUBJECTIVE AND OBJECTIVE BOX
Patient is a 83y old  Female who presents with a chief complaint of fever, x 1 week, weakness (23 Jul 2018 10:18)      INTERVAL HPI/OVERNIGHT EVENTS: no overnight events    MEDICATIONS  (STANDING):  aspirin enteric coated 81 milliGRAM(s) Oral daily  atorvastatin 40 milliGRAM(s) Oral at bedtime  diltiazem    Tablet 30 milliGRAM(s) Oral every 6 hours  ertapenem  IVPB      ertapenem  IVPB 500 milliGRAM(s) IV Intermittent every 24 hours  gabapentin 100 milliGRAM(s) Oral three times a day  hydrALAZINE 50 milliGRAM(s) Oral three times a day  metoprolol tartrate 25 milliGRAM(s) Oral every 6 hours  mirtazapine 7.5 milliGRAM(s) Oral at bedtime  pantoprazole    Tablet 40 milliGRAM(s) Oral before breakfast    MEDICATIONS  (PRN):  acetaminophen   Tablet 650 milliGRAM(s) Oral every 8 hours PRN Mild Pain (1 - 3)  docusate sodium 100 milliGRAM(s) Oral two times a day PRN Constipation  polyethylene glycol 3350 17 Gram(s) Oral daily PRN Constipation  senna 2 Tablet(s) Oral at bedtime PRN Constipation      Allergies    No Known Allergies    Intolerances        REVIEW OF SYSTEMS:  CONSTITUTIONAL: denies fever, chills , weakness, fatigue  NECK: denies neck pain/stiffness  RESPIRATORY: denies cough, wheezing, SOB,  hemoptysis   CARDIOVASCULAR: denies chest pain, palpitations, leg swelling  GASTROINTESTINAL: denies abdominal pain,  N/V/D/C    GENITOURINARY: denies dysuria, frequency, urgency, hematuria  NEUROLOGICAL: denies headaches, dizziness, numbness, tingling  SKIN:   denies new rash or lesions  REST OF REVIEW Of SYSTEM - All other ROS negative.    Height (cm): 160.02 (07-20 @ 20:47)  Weight (kg): 52.6 (07-20 @ 20:47)  BMI (kg/m2): 20.5 (07-20 @ 20:47)  BSA (m2): 1.53 (07-20 @ 20:47)  Vital Signs Last 24 Hrs  T(C): 36.6 (24 Jul 2018 13:48), Max: 36.6 (23 Jul 2018 20:18)  T(F): 97.8 (24 Jul 2018 13:48), Max: 97.9 (23 Jul 2018 20:18)  HR: 73 (24 Jul 2018 13:48) (73 - 81)  BP: 110/69 (24 Jul 2018 13:48) (110/69 - 154/82)  BP(mean): --  RR: 18 (24 Jul 2018 13:48) (18 - 18)  SpO2: 95% (24 Jul 2018 13:48) (95% - 97%)  [ ] room air   [ ] 02    PHYSICAL EXAM:  GENERAL:  No acute distresss , A&O x 3  HEAD:  NCAT, EOMI  ENMT: MMM  NECK:  Supple , no JVD  CHEST/LUNG: CTA B/L , No W/R/R  HEART:  Regular rate and rhythm, No M/R/G  ABDOMEN:  soft, nontender, nondistended, positive bowel sounds   EXTREMITIES: No clubbing, cyanosis or edema  Neuro: No focal deficits     LABS:                        9.5    6.40  )-----------( 297      ( 24 Jul 2018 07:52 )             30.3     24 Jul 2018 07:52    145    |  111    |  29     ----------------------------<  89     4.1     |  26     |  1.20     Ca    8.4        24 Jul 2018 07:52    TPro  6.3    /  Alb  2.6    /  TBili  0.4    /  DBili  x      /  AST  20     /  ALT  45     /  AlkPhos  248    24 Jul 2018 07:52    PT/INR - ( 24 Jul 2018 07:52 )   PT: 17.8 sec;   INR: 1.62 ratio               CAPILLARY BLOOD GLUCOSE        Cultures  Culture Results:   No growth to date. (07-22 @ 10:30)  Culture Results:   Growth in anaerobic bottle: Escherichia coli ESBL  "Due to technical problems, Proteus sp. will Not be reported as part of  the BCID panel until further notice"  ***Blood Panel PCR results on this specimen are available  approximately 3 hours afterthe Gram stain result.***  Gram stain, PCR, and/or culture results may not always  correspond due to difference in methodologies.  ************************************************************  This PCR assay was performed using [employer].  The following targets are tested for: Enterococcus,  vancomycin resistant enterococci, Listeria monocytogenes,  coagulase negative staphylococci, S. aureus,  methicillin resistant S. aureus, Streptococcus agalactiae  (Group B), S. pneumoniae, S. pyogenes (Group A),  Acinetobacter baumannii, Enterobacter cloacae, E. coli,  Klebsiella oxytoca, K. pneumoniae, Proteus sp.,  Serratia marcescens, Haemophilus influenzae,  Neisseria meningitidis, Pseudomonas aeruginosa, Candida  albicans, C. glabrata, C krusei, C parapsilosis,  C. tropicalis and the KPC resistance gene. (07-21 @ 10:37)  Culture Results:   No growth to date. (07-21 @ 10:33)  Culture Results:   >100,000 CFU/ml Escherichia coli ESBL  >100,000 CFU/ml Klebsiella pneumoniae (07-21 @ 10:03)          RADIOLOGY & ADDITIONAL TESTS:    A/P:         Care Discussed with [ ] Consultants  [ ] Patient  [ ] Family  [ ]   /   [ ] Other; RN  DVT prophylaxis [ ] lovenox   [ ] subq heparin  [ ] coumadin  [ ] venodynes [ ] ambulating frequently at how risk for vte and no pharm         or  mechanical prophylaxis required    [ ] other   Advanced directive:    [ ]pt has hcp     [ ] pt declined to assign hcp  Discussed with pt @ bedside Patient is a 83y old  Female who presents with a chief complaint of fever, x 1 week, weakness (23 Jul 2018 10:18)      INTERVAL HPI/OVERNIGHT EVENTS: no overnight events    MEDICATIONS  (STANDING):  aspirin enteric coated 81 milliGRAM(s) Oral daily  atorvastatin 40 milliGRAM(s) Oral at bedtime  diltiazem    Tablet 30 milliGRAM(s) Oral every 6 hours  ertapenem  IVPB      ertapenem  IVPB 500 milliGRAM(s) IV Intermittent every 24 hours  gabapentin 100 milliGRAM(s) Oral three times a day  hydrALAZINE 50 milliGRAM(s) Oral three times a day  metoprolol tartrate 25 milliGRAM(s) Oral every 6 hours  mirtazapine 7.5 milliGRAM(s) Oral at bedtime  pantoprazole    Tablet 40 milliGRAM(s) Oral before breakfast    MEDICATIONS  (PRN):  acetaminophen   Tablet 650 milliGRAM(s) Oral every 8 hours PRN Mild Pain (1 - 3)  docusate sodium 100 milliGRAM(s) Oral two times a day PRN Constipation  polyethylene glycol 3350 17 Gram(s) Oral daily PRN Constipation  senna 2 Tablet(s) Oral at bedtime PRN Constipation      Allergies    No Known Allergies    Intolerances        REVIEW OF SYSTEMS:  CONSTITUTIONAL: denies fever, chills , weakness, fatigue  NECK: denies neck pain/stiffness  RESPIRATORY: denies cough, wheezing, SOB,  hemoptysis   CARDIOVASCULAR: denies chest pain, palpitations, leg swelling  GASTROINTESTINAL: denies abdominal pain,  N/V/D/C    GENITOURINARY: denies dysuria, frequency, urgency, hematuria  NEUROLOGICAL: denies headaches, dizziness, numbness, tingling  SKIN:   denies new rash or lesions  REST OF REVIEW Of SYSTEM - All other ROS negative.    Height (cm): 160.02 (07-20 @ 20:47)  Weight (kg): 52.6 (07-20 @ 20:47)  BMI (kg/m2): 20.5 (07-20 @ 20:47)  BSA (m2): 1.53 (07-20 @ 20:47)  Vital Signs Last 24 Hrs  T(C): 36.6 (24 Jul 2018 13:48), Max: 36.6 (23 Jul 2018 20:18)  T(F): 97.8 (24 Jul 2018 13:48), Max: 97.9 (23 Jul 2018 20:18)  HR: 73 (24 Jul 2018 13:48) (73 - 81)  BP: 110/69 (24 Jul 2018 13:48) (110/69 - 154/82)  BP(mean): --  RR: 18 (24 Jul 2018 13:48) (18 - 18)  SpO2: 95% (24 Jul 2018 13:48) (95% - 97%)  [ ] room air   [ ] 02    PHYSICAL EXAM:  GENERAL:  No acute distresss , A&O x 3  HEAD:  NCAT, EOMI  ENMT: MMM  NECK:  Supple , no JVD  CHEST/LUNG: CTA B/L , No W/R/R  HEART:  Regular rate and rhythm, No M/R/G  ABDOMEN:  soft, nontender, nondistended, positive bowel sounds   EXTREMITIES: No clubbing, cyanosis or edema  Neuro: No focal deficits     LABS:                        9.5    6.40  )-----------( 297      ( 24 Jul 2018 07:52 )             30.3     24 Jul 2018 07:52    145    |  111    |  29     ----------------------------<  89     4.1     |  26     |  1.20     Ca    8.4        24 Jul 2018 07:52    TPro  6.3    /  Alb  2.6    /  TBili  0.4    /  DBili  x      /  AST  20     /  ALT  45     /  AlkPhos  248    24 Jul 2018 07:52    PT/INR - ( 24 Jul 2018 07:52 )   PT: 17.8 sec;   INR: 1.62 ratio               CAPILLARY BLOOD GLUCOSE        Cultures  Culture Results:   No growth to date. (07-22 @ 10:30)  Culture Results:   Growth in anaerobic bottle: Escherichia coli ESBL  "Due to technical problems, Proteus sp. will Not be reported as part of  the BCID panel until further notice"  ***Blood Panel PCR results on this specimen are available  approximately 3 hours afterthe Gram stain result.***  Gram stain, PCR, and/or culture results may not always  correspond due to difference in methodologies.  ************************************************************  This PCR assay was performed using [employer].  The following targets are tested for: Enterococcus,  vancomycin resistant enterococci, Listeria monocytogenes,  coagulase negative staphylococci, S. aureus,  methicillin resistant S. aureus, Streptococcus agalactiae  (Group B), S. pneumoniae, S. pyogenes (Group A),  Acinetobacter baumannii, Enterobacter cloacae, E. coli,  Klebsiella oxytoca, K. pneumoniae, Proteus sp.,  Serratia marcescens, Haemophilus influenzae,  Neisseria meningitidis, Pseudomonas aeruginosa, Candida  albicans, C. glabrata, C krusei, C parapsilosis,  C. tropicalis and the KPC resistance gene. (07-21 @ 10:37)  Culture Results:   No growth to date. (07-21 @ 10:33)  Culture Results:   >100,000 CFU/ml Escherichia coli ESBL  >100,000 CFU/ml Klebsiella pneumoniae (07-21 @ 10:03)

## 2018-07-24 NOTE — PROGRESS NOTE ADULT - ASSESSMENT
82 yo female w/ PMH of afib on coumadin, CVA and hx of aneurysm, HTN, HLD, mitral valve prolapse, PVD s/p stents x 3 (RLE), rheumatoid arthritis, who presents with one week of fever, diaphoresis,  chills, weakness, and lethargy found to have  GNR bacteremia and ecoli/klebsiella in urine.

## 2018-07-24 NOTE — PROGRESS NOTE ADULT - SUBJECTIVE AND OBJECTIVE BOX
INTERVAL HPI/OVERNIGHT EVENTS:  pt seen and examined  pt denies n/v/d/abd pain, reports +brown bm yesterday  per overnight rn no s/s overt gib  labs noted afebrile overnight    MEDICATIONS  (STANDING):  aspirin enteric coated 81 milliGRAM(s) Oral daily  atorvastatin 40 milliGRAM(s) Oral at bedtime  diltiazem    Tablet 30 milliGRAM(s) Oral every 6 hours  ertapenem  IVPB      ertapenem  IVPB 500 milliGRAM(s) IV Intermittent every 24 hours  gabapentin 100 milliGRAM(s) Oral three times a day  hydrALAZINE 50 milliGRAM(s) Oral three times a day  metoprolol tartrate 25 milliGRAM(s) Oral every 6 hours  mirtazapine 7.5 milliGRAM(s) Oral at bedtime  pantoprazole    Tablet 40 milliGRAM(s) Oral before breakfast    MEDICATIONS  (PRN):  acetaminophen   Tablet 650 milliGRAM(s) Oral every 8 hours PRN Mild Pain (1 - 3)  docusate sodium 100 milliGRAM(s) Oral two times a day PRN Constipation  polyethylene glycol 3350 17 Gram(s) Oral daily PRN Constipation  senna 2 Tablet(s) Oral at bedtime PRN Constipation      Allergies    No Known Allergies    Intolerances        Review of Systems:    General:  No wt loss, fevers, chills, night sweats, fatigue   Eyes:  Good vision, no reported pain  ENT:  No sore throat, pain, runny nose, dysphagia  CV:  No pain, palpitations, hypo/hypertension  Resp:  No dyspnea, cough, tachypnea, wheezing  GI:  No pain, No nausea, No vomiting, No diarrhea, No constipation, No weight loss, No fever, No pruritis, No rectal bleeding, No melena, No dysphagia  :  No pain, bleeding, incontinence, nocturia  Muscle:  No pain, weakness  Neuro:  No weakness, tingling, memory problems  Psych:  No fatigue, insomnia, mood problems, depression  Endocrine:  No polyuria, polydypsia, cold/heat intolerance  Heme:  No petechiae, ecchymosis, easy bruisability  Skin:  No rash, tattoos, scars, edema      Vital Signs Last 24 Hrs  T(C): 36.3 (24 Jul 2018 05:11), Max: 36.6 (23 Jul 2018 20:18)  T(F): 97.4 (24 Jul 2018 05:11), Max: 97.9 (23 Jul 2018 20:18)  HR: 73 (24 Jul 2018 05:11) (73 - 81)  BP: 154/82 (24 Jul 2018 05:11) (115/71 - 154/82)  BP(mean): --  RR: 18 (24 Jul 2018 05:11) (17 - 18)  SpO2: 97% (24 Jul 2018 05:11) (97% - 97%)    PHYSICAL EXAM:    Constitutional: NAD, lying in bed  HEENT: EOMI, perrl  Neck: No LAD  Respiratory: dec bs  Cardiovascular: S1 and S2, RRR  Gastrointestinal: BS+, soft,  nt, nd  Extremities: No peripheral edema  Vascular: 2+ peripheral pulses  Neurological: Awake a;ert responds appropriately  Skin: No rashes      LABS:                        9.5    6.40  )-----------( 297      ( 24 Jul 2018 07:52 )             30.3     07-24    145  |  111<H>  |  29<H>  ----------------------------<  89  4.1   |  26  |  1.20    Ca    8.4<L>      24 Jul 2018 07:52    TPro  6.3  /  Alb  2.6<L>  /  TBili  0.4  /  DBili  x   /  AST  20  /  ALT  45  /  AlkPhos  248<H>  07-24    PT/INR - ( 24 Jul 2018 07:52 )   PT: 17.8 sec;   INR: 1.62 ratio               RADIOLOGY & ADDITIONAL TESTS:

## 2018-07-24 NOTE — PROGRESS NOTE ADULT - SUBJECTIVE AND OBJECTIVE BOX
[facility]    Dahiana Bañuelos, Evens, Toney, Terrie, Truman, Filiberto      [phone]    CHIEF COMPLAINT: Patient is a 83y old  Female who presents with a chief complaint of fever, x 1 week, weakness (23 Jul 2018 10:18)      Follow Up: labile htn    Interim history: The patient reports no new symptoms.  Denies chest discomfort and shortness of breath.  No abdominal pain.  No new neurologic symptoms.      MEDICATIONS  (STANDING):  aspirin enteric coated 81 milliGRAM(s) Oral daily  atorvastatin 40 milliGRAM(s) Oral at bedtime  diltiazem    Tablet 30 milliGRAM(s) Oral every 6 hours  ertapenem  IVPB      ertapenem  IVPB 500 milliGRAM(s) IV Intermittent every 24 hours  gabapentin 100 milliGRAM(s) Oral three times a day  hydrALAZINE 50 milliGRAM(s) Oral three times a day  metoprolol tartrate 25 milliGRAM(s) Oral every 6 hours  mirtazapine 7.5 milliGRAM(s) Oral at bedtime  pantoprazole    Tablet 40 milliGRAM(s) Oral before breakfast    MEDICATIONS  (PRN):  acetaminophen   Tablet 650 milliGRAM(s) Oral every 8 hours PRN Mild Pain (1 - 3)  docusate sodium 100 milliGRAM(s) Oral two times a day PRN Constipation  polyethylene glycol 3350 17 Gram(s) Oral daily PRN Constipation  senna 2 Tablet(s) Oral at bedtime PRN Constipation      REVIEW OF SYSTEMS:  eye, ent, GI, , allergic, dermatologic, musculoskeletal and neurologic are negative except as described above    Vital Signs Last 24 Hrs  T(C): 36.3 (24 Jul 2018 05:11), Max: 36.6 (23 Jul 2018 20:18)  T(F): 97.4 (24 Jul 2018 05:11), Max: 97.9 (23 Jul 2018 20:18)  HR: 73 (24 Jul 2018 05:11) (73 - 81)  BP: 154/82 (24 Jul 2018 05:11) (115/71 - 154/82)  BP(mean): --  RR: 18 (24 Jul 2018 05:11) (17 - 18)  SpO2: 97% (24 Jul 2018 05:11) (97% - 97%)    I&O's Summary      Telemetry past 24h:    PHYSICAL EXAM:    Constitutional: well-nourished, well-developed, NAD   HEENT:  MMM, sclerae anicteric, conjunctivae clear, no oral cyanosis.  Pulmonary: Non-labored, breath sounds are clear bilaterally, No wheezing, rales or rhonchi  Cardiovascular: Regular, S1 and S2.  No murmur.  No rubs, gallops or clicks  Gastrointestinal: Bowel Sounds present, soft, nontender.   Lymph: No peripheral edema.   Neurological: Alert, no focal deficits  Skin: No rashes.  Psych:  Mood & affect appropriate    LABS: All Labs Reviewed:                        9.5    6.40  )-----------( 297      ( 24 Jul 2018 07:52 )             30.3                         9.4    7.04  )-----------( 297      ( 23 Jul 2018 07:19 )             30.1                         9.3    8.48  )-----------( 282      ( 22 Jul 2018 07:35 )             29.9     24 Jul 2018 07:52    145    |  111    |  29     ----------------------------<  89     4.1     |  26     |  1.20   23 Jul 2018 07:19    147    |  112    |  25     ----------------------------<  89     3.9     |  28     |  1.20   22 Jul 2018 07:35    148    |  114    |  22     ----------------------------<  90     4.0     |  26     |  1.10     Ca    8.4        24 Jul 2018 07:52  Ca    8.5        23 Jul 2018 07:19  Ca    8.2        22 Jul 2018 07:35    TPro  6.3    /  Alb  2.6    /  TBili  0.4    /  DBili  x      /  AST  20     /  ALT  45     /  AlkPhos  248    24 Jul 2018 07:52  TPro  6.2    /  Alb  2.5    /  TBili  0.4    /  DBili  x      /  AST  25     /  ALT  55     /  AlkPhos  257    23 Jul 2018 07:19  TPro  5.9    /  Alb  2.4    /  TBili  0.6    /  DBili  x      /  AST  27     /  ALT  64     /  AlkPhos  249    22 Jul 2018 07:35    PT/INR - ( 24 Jul 2018 07:52 )   PT: 17.8 sec;   INR: 1.62 ratio               Blood Culture: Organism --  Gram Stain Blood -- Gram Stain --  Specimen Source .Blood Blood-Venous  Culture-Blood --    Organism Blood Culture PCR  Gram Stain Blood -- Gram Stain   Growth in anaerobic bottle: Gram Negative Rods  Specimen Source .Blood Blood-Peripheral  Culture-Blood --    Organism --  Gram Stain Blood -- Gram Stain --  Specimen Source .Blood Blood-Peripheral  Culture-Blood --    Organism Escherichia coli ESBL  Gram Stain Blood -- Gram Stain --  Specimen Source .Urine Clean Catch (Midstream)  Culture-Blood --        07-22 @ 07:35  TSH: 2.10      RADIOLOGY:    EKG:    Echo:

## 2018-07-25 ENCOUNTER — RESULT REVIEW (OUTPATIENT)
Age: 83
End: 2018-07-25

## 2018-07-25 DIAGNOSIS — Z01.818 ENCOUNTER FOR OTHER PREPROCEDURAL EXAMINATION: ICD-10-CM

## 2018-07-25 LAB
ALBUMIN SERPL ELPH-MCNC: 2.5 G/DL — LOW (ref 3.3–5)
ALP SERPL-CCNC: 241 U/L — HIGH (ref 40–120)
ALT FLD-CCNC: 40 U/L — SIGNIFICANT CHANGE UP (ref 12–78)
ANION GAP SERPL CALC-SCNC: 7 MMOL/L — SIGNIFICANT CHANGE UP (ref 5–17)
AST SERPL-CCNC: 18 U/L — SIGNIFICANT CHANGE UP (ref 15–37)
BILIRUB SERPL-MCNC: 0.3 MG/DL — SIGNIFICANT CHANGE UP (ref 0.2–1.2)
BUN SERPL-MCNC: 37 MG/DL — HIGH (ref 7–23)
CALCIUM SERPL-MCNC: 8.2 MG/DL — LOW (ref 8.5–10.1)
CHLORIDE SERPL-SCNC: 113 MMOL/L — HIGH (ref 96–108)
CO2 SERPL-SCNC: 26 MMOL/L — SIGNIFICANT CHANGE UP (ref 22–31)
CREAT SERPL-MCNC: 1.3 MG/DL — SIGNIFICANT CHANGE UP (ref 0.5–1.3)
GLUCOSE SERPL-MCNC: 99 MG/DL — SIGNIFICANT CHANGE UP (ref 70–99)
HCT VFR BLD CALC: 28 % — LOW (ref 34.5–45)
HGB BLD-MCNC: 8.7 G/DL — LOW (ref 11.5–15.5)
INR BLD: 1.46 RATIO — HIGH (ref 0.88–1.16)
MCHC RBC-ENTMCNC: 25.7 PG — LOW (ref 27–34)
MCHC RBC-ENTMCNC: 31.1 GM/DL — LOW (ref 32–36)
MCV RBC AUTO: 82.8 FL — SIGNIFICANT CHANGE UP (ref 80–100)
NRBC # BLD: 0 /100 WBCS — SIGNIFICANT CHANGE UP (ref 0–0)
PLATELET # BLD AUTO: 287 K/UL — SIGNIFICANT CHANGE UP (ref 150–400)
POTASSIUM SERPL-MCNC: 4.2 MMOL/L — SIGNIFICANT CHANGE UP (ref 3.5–5.3)
POTASSIUM SERPL-SCNC: 4.2 MMOL/L — SIGNIFICANT CHANGE UP (ref 3.5–5.3)
PROT SERPL-MCNC: 5.9 G/DL — LOW (ref 6–8.3)
PROTHROM AB SERPL-ACNC: 16 SEC — HIGH (ref 9.8–12.7)
RBC # BLD: 3.38 M/UL — LOW (ref 3.8–5.2)
RBC # FLD: 17.8 % — HIGH (ref 10.3–14.5)
SODIUM SERPL-SCNC: 146 MMOL/L — HIGH (ref 135–145)
WBC # BLD: 6.62 K/UL — SIGNIFICANT CHANGE UP (ref 3.8–10.5)
WBC # FLD AUTO: 6.62 K/UL — SIGNIFICANT CHANGE UP (ref 3.8–10.5)

## 2018-07-25 PROCEDURE — 88300 SURGICAL PATH GROSS: CPT | Mod: 26

## 2018-07-25 PROCEDURE — 99233 SBSQ HOSP IP/OBS HIGH 50: CPT

## 2018-07-25 PROCEDURE — 99232 SBSQ HOSP IP/OBS MODERATE 35: CPT

## 2018-07-25 RX ORDER — METOCLOPRAMIDE HCL 10 MG
5 TABLET ORAL ONCE
Qty: 0 | Refills: 0 | Status: DISCONTINUED | OUTPATIENT
Start: 2018-07-25 | End: 2018-07-25

## 2018-07-25 RX ORDER — OXYCODONE HYDROCHLORIDE 5 MG/1
5 TABLET ORAL ONCE
Qty: 0 | Refills: 0 | Status: DISCONTINUED | OUTPATIENT
Start: 2018-07-25 | End: 2018-07-25

## 2018-07-25 RX ORDER — OXYCODONE HYDROCHLORIDE 5 MG/1
10 TABLET ORAL ONCE
Qty: 0 | Refills: 0 | Status: DISCONTINUED | OUTPATIENT
Start: 2018-07-25 | End: 2018-07-25

## 2018-07-25 RX ORDER — SODIUM CHLORIDE 9 MG/ML
1000 INJECTION, SOLUTION INTRAVENOUS
Qty: 0 | Refills: 0 | Status: DISCONTINUED | OUTPATIENT
Start: 2018-07-25 | End: 2018-07-25

## 2018-07-25 RX ORDER — WARFARIN SODIUM 2.5 MG/1
3 TABLET ORAL ONCE
Qty: 0 | Refills: 0 | Status: COMPLETED | OUTPATIENT
Start: 2018-07-25 | End: 2018-07-25

## 2018-07-25 RX ORDER — HYDROMORPHONE HYDROCHLORIDE 2 MG/ML
0.5 INJECTION INTRAMUSCULAR; INTRAVENOUS; SUBCUTANEOUS
Qty: 0 | Refills: 0 | Status: DISCONTINUED | OUTPATIENT
Start: 2018-07-25 | End: 2018-07-25

## 2018-07-25 RX ADMIN — MIRTAZAPINE 7.5 MILLIGRAM(S): 45 TABLET, ORALLY DISINTEGRATING ORAL at 22:40

## 2018-07-25 RX ADMIN — SODIUM CHLORIDE 60 MILLILITER(S): 9 INJECTION, SOLUTION INTRAVENOUS at 14:55

## 2018-07-25 RX ADMIN — Medication 25 MILLIGRAM(S): at 05:52

## 2018-07-25 RX ADMIN — Medication 50 MILLIGRAM(S): at 16:23

## 2018-07-25 RX ADMIN — ERTAPENEM SODIUM 100 MILLIGRAM(S): 1 INJECTION, POWDER, LYOPHILIZED, FOR SOLUTION INTRAMUSCULAR; INTRAVENOUS at 14:55

## 2018-07-25 RX ADMIN — PANTOPRAZOLE SODIUM 40 MILLIGRAM(S): 20 TABLET, DELAYED RELEASE ORAL at 06:33

## 2018-07-25 RX ADMIN — GABAPENTIN 100 MILLIGRAM(S): 400 CAPSULE ORAL at 05:52

## 2018-07-25 RX ADMIN — Medication 25 MILLIGRAM(S): at 00:25

## 2018-07-25 RX ADMIN — GABAPENTIN 100 MILLIGRAM(S): 400 CAPSULE ORAL at 16:23

## 2018-07-25 RX ADMIN — Medication 25 MILLIGRAM(S): at 17:19

## 2018-07-25 RX ADMIN — ATORVASTATIN CALCIUM 40 MILLIGRAM(S): 80 TABLET, FILM COATED ORAL at 22:40

## 2018-07-25 RX ADMIN — GABAPENTIN 100 MILLIGRAM(S): 400 CAPSULE ORAL at 22:40

## 2018-07-25 RX ADMIN — WARFARIN SODIUM 3 MILLIGRAM(S): 2.5 TABLET ORAL at 22:40

## 2018-07-25 RX ADMIN — Medication 50 MILLIGRAM(S): at 05:52

## 2018-07-25 NOTE — PROGRESS NOTE ADULT - ATTENDING COMMENTS
Seen/examined. Agree with above.  Ok to proceed with ERCP.   Would tend to bridge with Lovenox given CVA in past, if no contraindication. Seen/examined. Agree with above.  Ok to proceed with ERCP.   Coumadin for goal INR 2-3. Can bridge with heparin or lovenox given her history of CVA in past.

## 2018-07-25 NOTE — PROGRESS NOTE ADULT - SUBJECTIVE AND OBJECTIVE BOX
[facility] -- Dahiana Bañuelos, Toney Horner, Truman Falcon Savella  Office # [phone]      Follow Up:  labile htn, preop clearance for ERCP today    Subjective/Observations: Seen and examined.  Sitting in chair denies cp, sob, palpitations, n/v or abd pain.  No signs of orthopnea or PND.        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  Osteoarthritis  Afib  PVD (peripheral vascular disease)  Neuropathy: (Right lower leg)  H/O cerebral aneurysm repair: brain clips  CVA (cerebral vascular accident): (&quot;Mini-stroke&quot;,1990&#x27;s)  Rheumatoid arthritis  Hyperlipidemia  Hypertension  S/P cataract surgery: (Left eye)  Elective surgery: (&quot;Twisted bowel&quot;, 2014)  Elective surgery: (Exision of cyst on liver, 1985)  S/P ORIF (open reduction internal fixation) fracture: Left hip fx (2012) &amp; R hip fx (2013)  H/O cerebral aneurysm repair: Brain clips (1978(  Renal stone: Cysto stent placement 10/1/2014  PVD (peripheral vascular disease): s/p RLE bypass x 3, most recent 3/2012 Right external iliac to PT bypass w/ PTFE (2012)  S/P FRED (total abdominal hysterectomy): (1987, Hx of &quot;ovarian cancer?&quot;)      MEDICATIONS  (STANDING):  aspirin enteric coated 81 milliGRAM(s) Oral daily  atorvastatin 40 milliGRAM(s) Oral at bedtime  diltiazem    Tablet 30 milliGRAM(s) Oral every 6 hours  ertapenem  IVPB      ertapenem  IVPB 500 milliGRAM(s) IV Intermittent every 24 hours  gabapentin 100 milliGRAM(s) Oral three times a day  hydrALAZINE 50 milliGRAM(s) Oral three times a day  metoprolol tartrate 25 milliGRAM(s) Oral every 6 hours  mirtazapine 7.5 milliGRAM(s) Oral at bedtime  pantoprazole    Tablet 40 milliGRAM(s) Oral before breakfast    MEDICATIONS  (PRN):  acetaminophen   Tablet 650 milliGRAM(s) Oral every 8 hours PRN Mild Pain (1 - 3)  docusate sodium 100 milliGRAM(s) Oral two times a day PRN Constipation  polyethylene glycol 3350 17 Gram(s) Oral daily PRN Constipation  senna 2 Tablet(s) Oral at bedtime PRN Constipation      Allergies    No Known Allergies    Intolerances            Vital Signs Last 24 Hrs  T(C): 36.5 (25 Jul 2018 04:20), Max: 36.6 (24 Jul 2018 12:03)  T(F): 97.7 (25 Jul 2018 04:20), Max: 97.8 (24 Jul 2018 12:03)  HR: 69 (25 Jul 2018 04:20) (69 - 83)  BP: 161/74 (25 Jul 2018 04:20) (110/69 - 161/74)  BP(mean): --  RR: 16 (25 Jul 2018 04:20) (16 - 18)  SpO2: 96% (25 Jul 2018 04:20) (93% - 97%)    I&O's Summary    Weight (kg): 52.6 (07-25 @ 09:53)    PHYSICAL EXAM:  TELE: Not on tele  Constitutional: NAD, awake and alert, frail, cachectic  HEENT: Moist Mucous Membranes, Anicteric  Pulmonary: Non-labored, breath sounds are clear bilaterally, No wheezing, rales or rhonchi  Cardiovascular: Regular, S1 and S2, No murmurs, rubs, gallops or clicks  Gastrointestinal: Bowel Sounds present, soft, nontender.   Lymph: No peripheral edema. No lymphadenopathy.  Skin: No visible rashes or ulcers.  Psych:  Mood & affect appropriate    LABS: All Labs Reviewed:                        8.7    6.62  )-----------( 287      ( 25 Jul 2018 07:11 )             28.0                         9.5    6.40  )-----------( 297      ( 24 Jul 2018 07:52 )             30.3                         9.4    7.04  )-----------( 297      ( 23 Jul 2018 07:19 )             30.1     25 Jul 2018 07:11    146    |  113    |  37     ----------------------------<  99     4.2     |  26     |  1.30   24 Jul 2018 07:52    145    |  111    |  29     ----------------------------<  89     4.1     |  26     |  1.20   23 Jul 2018 07:19    147    |  112    |  25     ----------------------------<  89     3.9     |  28     |  1.20     Ca    8.2        25 Jul 2018 07:11  Ca    8.4        24 Jul 2018 07:52  Ca    8.5        23 Jul 2018 07:19    TPro  5.9    /  Alb  2.5    /  TBili  0.3    /  DBili  x      /  AST  18     /  ALT  40     /  AlkPhos  241    25 Jul 2018 07:11  TPro  6.3    /  Alb  2.6    /  TBili  0.4    /  DBili  x      /  AST  20     /  ALT  45     /  AlkPhos  248    24 Jul 2018 07:52  TPro  6.2    /  Alb  2.5    /  TBili  0.4    /  DBili  x      /  AST  25     /  ALT  55     /  AlkPhos  257    23 Jul 2018 07:19    PT/INR - ( 25 Jul 2018 07:11 )   PT: 16.0 sec;   INR: 1.46 ratio      < from: 12 Lead ECG (07.20.18 @ 21:15) >  Ventricular Rate 105 BPM    Atrial Rate 105 BPM    P-R Interval 150 ms    QRS Duration 94 ms    Q-T Interval 344 ms    QTC Calculation(Bezet) 454 ms    P Axis 31 degrees    R Axis -33 degrees    T Axis 52 degrees    Diagnosis Line Sinus tachycardia  Left axis deviation  Minimal voltage criteria for LVH, may be normal variant  Abnormal ECG    Confirmed by ovidio De Los Santos ([id number]) on 7/21/2018 5:43:44 PM    < end of copied text >  < from: TTE Echo Doppler w/o Cont (02.05.18 @ 08:11) >   EXAM:  ECHO TTE W/O CON COMP W/DOPPLR         PROCEDURE DATE:  02/05/2018        INTERPRETATION:  INDICATION: respiratory failure  Referring M.DNiru:Lona  Blood Pressure 126/59        Weight (kg) :49     Height (cm):157       BSA (sq m): 1.47  Technician: JOJO    Dimensions:    LA 3.4       Normal Values: 2.0 - 4.0 cm    Ao 3.0        Normal Values: 2.0 - 3.8 cm  SEPTUM 0.8       Normal Values: 0.6 - 1.2 cm  PWT 0.8       Normal Values: 0.6 - 1.1 cm  LVIDd 3.4         Normal Values: 3.0 - 5.6 cm  LVIDs 2.3         Normal Values: 1.8 - 4.0 cm      OBSERVATIONS:  Technically difficult bedside ICU study. The patient is vented  Mitral Valve: Calcified mitral annulus and mitral valve leaflets with   normal opening. Mild mitral regurgitation.  Aortic Valve/Aorta: Calcified trileaflet aortic valve with normal opening.  Tricuspid Valve: Calcified trileaflet cusp at annulus with normally   opening valve leaflets. Mild tricuspid regurgitation.  Pulmonic Valve: The pulmonic valve is not well visualized. Probably   normal. Mild PI  Left Atrium: Small to moderate left atrial enlargement  Right Atrium: Normal  Left Ventricle: The endocardium is not well-visualized. There appears to   be grossly preserved left ventricular systolic function. There appears to   be concentric left ventricular hypertrophy. The EF is approximately 65%.  Right Ventricle: Normal right ventricular size and function. There is a   device wire noted in the right heart  Pericardium/Pleura: No pericardial effusion noted. Left pleural effusion   noted  Pulmonary/RV Pressure: The right ventricular systolic pressure is   estimated to be 41mmHg, assuming that the right atrial pressure is   estimated to be 8 mmHg. This is consistent with mild pulmonary   hypertension.  LV Diastolic Function: Stage II diastolic dysfunction    Conclusion: Likely difficult and limited study. Grossly preserved left   ventricular systolic function. Sensation diastolic dysfunction. Left   pleural effusion is noted.                  KIET NIETO M.D., ATTENDING CARDIOLOGIST  This document has been electronically signed. Feb 6 2018  7:49PM          < end of copied text >  < from: NM Hepatobiliary Scan w/wo Gall Bladder (07.23.18 @ 16:13) >  EXAM:  NM HEPATOBILIARY IMG                            PROCEDURE DATE:  07/23/2018          INTERPRETATION:  RADIOPHARMACEUTICAL: 3.0 mCi Tc-99m-Mebrofenin, I.V.    CLINICAL STATEMENT: 86-year-old female with fever and abdominal pain, and   cholelithiasis and distended gallbladder on recent CT.    TECHNIQUE:  Dynamic imaging of the anterior abdomen was performed for 75   minutes following injection of radiotracer. Sincalide, 1 mcg in 50 cc   normal saline solution was administered intravenouslyat 60 minutes into   the study. Static images of the abdomen in the anterior, right lateral,   and BORJAS views were obtained immediately thereafter.    Study is compared with hepatobiliary scan from 10/2/2014.    FINDINGS: There is prompt, homogeneous uptake of radiotracer by the   hepatocytes. Activity is first seen in the gallbladder at 30 minutes   minutes and in the bowel at 70 minutes, 10 minutes after sincalide   administration. There is good clearance of activity from the liver by the   end of the study.    IMPRESSION: Normal hepatobiliary scan.    No evidence of acute cholecystitis.    This is not significantly changed as compared to hepatobiliary scan from   10/2/2014.                    ANTWAN GOMEZ M.D., NUCLEAR MEDICINE ATTENDING  This document has been electronically signed. Jul 23 2018  4:11PM        < end of copied text >

## 2018-07-25 NOTE — PROGRESS NOTE ADULT - NSHPATTENDINGPLANDISCUSS_GEN_ALL_CORE
Okay to call and authorize   Her lancets and lancing device as requested  4 times daily testing   Or have them fax request    Dr. De Los Santos cardio, Ronel SCHNEIDER PA

## 2018-07-25 NOTE — PROGRESS NOTE ADULT - PROBLEM SELECTOR PLAN 4
- HIDA negative , h/o biliary stent due to cbd stone   - lfts trending down. continue to monitor . ERCP today. NPO   - gi following

## 2018-07-25 NOTE — CONSULT NOTE ADULT - SUBJECTIVE AND OBJECTIVE BOX
84 yo female presented septic on admission.  PT workup revealing for E. Coli bacteremia.  PT has hx of CBD stones, s/p ERCP in 11/17 with stent placement.  PT lost to follow up with no stent removal.  For ERCP today. Currently has no complaints..  Denying pain.  No N/V/D.      REVIEW OF SYSTEMS:    CONSTITUTIONAL: No weakness, fatigue, malaise, fevers or chills, no weight change, appetite change  EYES: No visual changes; No double vision,  No vertigo, eye pain  Ears: no otalgia, no otorhea, no hearing loss, tinnitus  Nose: no epistaxis, rhinorrhea, sinus pressure  Throat: no throat pain, no oral lesions  NECK: No pain or stiffness  RESPIRATORY: No cough (productive or dry), wheezing, hemoptysis; No shortness of breath  CARDIOVASCULAR: No chest pain or palpitations,    GASTROINTESTINAL: No abdominal or epigastric pain. No nausea, vomiting, or hematemesis; No diarrhea or constipation. No melena or hematochezia.  GENITOURINARY: No dysuria, frequency, urgency or hematuria,    NEUROLOGICAL: No numbness or weakness, headache, memory loss,   SKIN: No pruritis, rashes, lesions or new moles  Psych: No anxiety, sadness, insomnia,    Endocrine: No Heat or Cold intolerance, polydipsia, polyphagia  Heme/Lymph: no LN enlargement, no easy bruising or bleeding     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  Osteoarthritis  Afib  PVD (peripheral vascular disease)  Neuropathy: (Right lower leg)  H/O cerebral aneurysm repair: brain clips  CVA (cerebral vascular accident): (&quot;Mini-stroke&quot;,1990&#x27;s)  Rheumatoid arthritis  Hyperlipidemia  Hypertension  S/P cataract surgery: (Left eye)  Elective surgery: (&quot;Twisted bowel&quot;, 2014)  Elective surgery: (Exision of cyst on liver, 1985)  S/P ORIF (open reduction internal fixation) fracture: Left hip fx (2012) &amp; R hip fx (2013)  H/O cerebral aneurysm repair: Brain clips (1978(  Renal stone: Cysto stent placement 10/1/2014  PVD (peripheral vascular disease): s/p RLE bypass x 3, most recent 3/2012 Right external iliac to PT bypass w/ PTFE (2012)  S/P FRED (total abdominal hysterectomy): (1987, Hx of &quot;ovarian cancer?&quot;)  MEDICATIONS  (STANDING):  aspirin enteric coated 81 milliGRAM(s) Oral daily  atorvastatin 40 milliGRAM(s) Oral at bedtime  diltiazem    Tablet 30 milliGRAM(s) Oral every 6 hours  ertapenem  IVPB      ertapenem  IVPB 500 milliGRAM(s) IV Intermittent every 24 hours  gabapentin 100 milliGRAM(s) Oral three times a day  hydrALAZINE 50 milliGRAM(s) Oral three times a day  metoprolol tartrate 25 milliGRAM(s) Oral every 6 hours  mirtazapine 7.5 milliGRAM(s) Oral at bedtime  pantoprazole    Tablet 40 milliGRAM(s) Oral before breakfast    MEDICATIONS  (PRN):  acetaminophen   Tablet 650 milliGRAM(s) Oral every 8 hours PRN Mild Pain (1 - 3)  docusate sodium 100 milliGRAM(s) Oral two times a day PRN Constipation  polyethylene glycol 3350 17 Gram(s) Oral daily PRN Constipation  senna 2 Tablet(s) Oral at bedtime PRN Constipation      Allergies    No Known Allergies    Intolerances    SH-negative x3    .  VITAL SIGNS:  T(C): 36.6 (07-25-18 @ 12:04), Max: 36.6 (07-24-18 @ 13:48)  T(F): 97.9 (07-25-18 @ 12:04), Max: 97.9 (07-25-18 @ 12:04)  HR: 78 (07-25-18 @ 12:04) (69 - 83)  BP: 131/69 (07-25-18 @ 12:04) (110/69 - 161/74)  BP(mean): --  RR: 16 (07-25-18 @ 12:04) (16 - 18)  SpO2: 96% (07-25-18 @ 12:04) (93% - 97%)  Wt(kg): --    PHYSICAL EXAM:    Constitutional:  NAD, resting comfortably in bed  Head: NC/AT  Eyes: PERRL b/l  ENT: MMM  Neck: supple; no JVD or thyromegaly  Respiratory: CTA B/L   Cardiac: +S1/S2; RRR   Gastrointestinal: soft, NT/ND; no rebound or guarding; + BS  Dermatologic: skin warm, dry and intact; no rashes, wounds, or scars  Lymphatic: no submandibular, cervical or  LAD  Neurologic: AAOx3; CNII-XII grossly intact; no focal deficits  Psychiatric: affect and characteristics of appearance, erbalizations, behaviors are appropriate                            8.7    6.62  )-----------( 287      ( 25 Jul 2018 07:11 )             28.0   07-25    146<H>  |  113<H>  |  37<H>  ----------------------------<  99  4.2   |  26  |  1.30    Ca    8.2<L>      25 Jul 2018 07:11    TPro  5.9<L>  /  Alb  2.5<L>  /  TBili  0.3  /  DBili  x   /  AST  18  /  ALT  40  /  AlkPhos  241<H>  07-25  < from: CT Abdomen and Pelvis w/ IV Cont (07.21.18 @ 00:34) >  IMPRESSION: Distended gallbladder containing stones. Mild intrahepatic   bile duct dilatation with a common bile duct stent in place.            < end of copied text >

## 2018-07-25 NOTE — PROGRESS NOTE ADULT - ASSESSMENT
84 yo female w/ PMH of PAF on AC, CVA and hx of aneurysm repair, HTN, HLD, mitral valve prolapse, PAD, RA, HFPEF p/w urosepsis    - No signs of significant ischemia or volume overload.   - Cont Abx per ID.   - HIDA with no evidence of acute rox.  Pt for ERCP today.  - Last echo 2/2018 with grossly normal LV systolic function and Diastolic dysfunction grade 2.  No need to repeat echo at this time.  - ECG with no evidence of ischemia.  Pt appears euvolemic and optimized from a cardiac standpoint for low to moderate risk procedure with routine hemodynamic monitoring during the perioperative period recommended.    - Monitor and replete electrolytes as necessary. Keep K>4.0 and Mg>2.0.   - HTN better controlled, cont present meds  - no acute arrhythmia  - h/p PAF; Cont cardizem and metoprolol at current doses. 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 Has not had any issues on these meds  - Dose Coumadin. Goal INR 2-3. Needs Daily INRs. Monitor H/H and for clinical signs of bleeding.   - PAD: currently disease is severe and not amenable to revascularization.   - surgical eval noted, no plan for procedure at this time    - Further cardiac workup will depend on clinical course.   - All other workup per primary team. Will follow    JAROD Saeed  Cardiology

## 2018-07-25 NOTE — PROGRESS NOTE ADULT - SUBJECTIVE AND OBJECTIVE BOX
CARITO CONSTANTINO is a 83yFemale , patient examined and chart reviewed.     INTERVAL HPI/ OVERNIGHT EVENTS:   Sp biliary stent removal.   Afebrile.    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  Osteoarthritis  Afib  PVD (peripheral vascular disease)  Neuropathy: (Right lower leg)  H/O cerebral aneurysm repair: brain clips  CVA (cerebral vascular accident): (&quot;Mini-stroke&quot;,1990&#x27;s)  Rheumatoid arthritis  Hyperlipidemia  Hypertension  S/P cataract surgery: (Left eye)  Elective surgery: (&quot;Twisted bowel&quot;, 2014)  Elective surgery: (Exision of cyst on liver, 1985)  S/P ORIF (open reduction internal fixation) fracture: Left hip fx (2012) &amp; R hip fx (2013)  H/O cerebral aneurysm repair: Brain clips (1978(  Renal stone: Cysto stent placement 10/1/2014  PVD (peripheral vascular disease): s/p RLE bypass x 3, most recent 3/2012 Right external iliac to PT bypass w/ PTFE (2012)  S/P FRED (total abdominal hysterectomy): (1987, Hx of &quot;ovarian cancer?&quot;)      For details regarding the patient's social history, family history, and other miscellaneous elements, please refer the initial infectious diseases consultation and/or the admitting history and physical examination for this admission.    ROS:  CONSTITUTIONAL:  Negative fever or chills, feels well, good appetite  EYES:  Negative  blurry vision or double vision  CARDIOVASCULAR:  Negative for chest pain or palpitations  RESPIRATORY:  Negative for cough, wheezing, or SOB   GASTROINTESTINAL:  Negative for nausea, vomiting, diarrhea, constipation, or abdominal pain  GENITOURINARY:  Negative frequency, urgency or dysuria  NEUROLOGIC:  No headache, confusion, dizziness, lightheadedness  All other systems were reviewed and are negative     NKDA    Current inpatient medications :    ANTIBIOTICS/RELEVANT:  ertapenem  IVPB      ertapenem  IVPB 500 milliGRAM(s) IV Intermittent every 24 hours      acetaminophen   Tablet 650 milliGRAM(s) Oral every 8 hours PRN  aspirin enteric coated 81 milliGRAM(s) Oral daily  atorvastatin 40 milliGRAM(s) Oral at bedtime  diltiazem    Tablet 30 milliGRAM(s) Oral every 6 hours  docusate sodium 100 milliGRAM(s) Oral two times a day PRN  gabapentin 100 milliGRAM(s) Oral three times a day  hydrALAZINE 50 milliGRAM(s) Oral three times a day  metoprolol tartrate 25 milliGRAM(s) Oral every 6 hours  mirtazapine 7.5 milliGRAM(s) Oral at bedtime  pantoprazole    Tablet 40 milliGRAM(s) Oral before breakfast  polyethylene glycol 3350 17 Gram(s) Oral daily PRN  senna 2 Tablet(s) Oral at bedtime PRN      Objective:    07-25 @ 07:01  -  07-25 @ 17:13  --------------------------------------------------------  IN: 110 mL / OUT: 0 mL / NET: 110 mL      T(C): 36.4 (07-25-18 @ 14:30), Max: 36.6 (07-25-18 @ 12:04)  HR: 72 (07-25-18 @ 15:02) (66 - 83)  BP: 114/50 (07-25-18 @ 15:02) (114/50 - 161/74)  RR: 13 (07-25-18 @ 15:02) (11 - 18)  SpO2: 95% (07-25-18 @ 15:02) (93% - 100%)  Wt(kg): --      Physical Exam:  General: no acute distress  Eyes: sclera anicteric, pupils equal and reactive to light  ENMT: buccal mucosa moist, pharynx not injected  Neck: supple, trachea midline  Lungs: clear, no wheeze/rhonchi  Cardiovascular: regular rate and rhythm, S1 S2  Abdomen: soft, nontender, no organomegaly present, bowel sounds normal  Neurological: alert and oriented x3, Cranial Nerves II-XII grossly intact  Skin: no increased ecchymosis/petechiae/purpura  Lymph Nodes: no palpable cervical/supraclavicular lymph nodes enlargements  Extremities: no cyanosis/clubbing/edema      LABS:                          8.7    6.62  )-----------( 287      ( 25 Jul 2018 07:11 )             28.0       07-25    146<H>  |  113<H>  |  37<H>  ----------------------------<  99  4.2   |  26  |  1.30    Ca    8.2<L>      25 Jul 2018 07:11    TPro  5.9<L>  /  Alb  2.5<L>  /  TBili  0.3  /  DBili  x   /  AST  18  /  ALT  40  /  AlkPhos  241<H>  07-25      PT/INR - ( 25 Jul 2018 07:11 )   PT: 16.0 sec;   INR: 1.46 ratio        MICROBIOLOGY:  Culture - Blood in AM (07.22.18 @ 10:30)    Specimen Source: .Blood Blood-Venous    Culture Results:   No growth to date.    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  Appropriate use of antibiotics and adverse effects reviewed.      I have discussed the above plan of care with patient/ family in detail. They expressed understanding of the  treatment plan . Risks, benefits and alternatives discussed in detail. I have asked if they have any questions or concerns and appropriately addressed them to the best of my ability .    > 35 minutes were spent in direct patient care reviewing notes, medications ,labs data/ imaging , discussion with multidisciplinary team.    Thank you for allowing me to participate in care of your patient .    Cynthia Jaimes MD  Infectious Disease  [phone]

## 2018-07-25 NOTE — PROGRESS NOTE ADULT - ASSESSMENT
84 yo female w/ PMH of afib on coumadin, CVA and hx of aneurysm, HTN, HLD, mitral valve prolapse, PVD s/p stents x 3 (RLE), rheumatoid arthritis, who presents with one week of fever, diaphoresis,  chills, weakness, and lethargy found to have  GNR bacteremia and ecoli/klebsiella in urine.

## 2018-07-25 NOTE — CONSULT NOTE ADULT - ASSESSMENT
82 yo with hx of CBD stones s/p stone removal and stent. Lost to f/u now with sepsis.  Possibly from stent     for ERCP  and stent removal/replacement today        D/w pt and daughter indication for cholecystectomy, both refusing at this time. D/W both risks with no surgery, aware and want to hold off

## 2018-07-25 NOTE — PROGRESS NOTE ADULT - SUBJECTIVE AND OBJECTIVE BOX
Patient is a 83y old  Female who presents with a chief complaint of fever, x 1 week, weakness (23 Jul 2018 10:18)      INTERVAL HPI: Pt seen and examined. States she feels well denies any acute complaints at this time.     OVERNIGHT EVENTS: none noted  T(F): 97.7 (07-25-18 @ 04:20), Max: 97.8 (07-24-18 @ 12:03)  HR: 69 (07-25-18 @ 04:20) (69 - 83)  BP: 161/74 (07-25-18 @ 04:20) (110/69 - 161/74)  RR: 16 (07-25-18 @ 04:20) (16 - 18)  SpO2: 96% (07-25-18 @ 04:20) (93% - 97%)  I&O's Summary      REVIEW OF SYSTEMS: 12 systems noncontributory other than that stated in hpi    PHYSICAL EXAM:  GENERAL: NAD, elder, frail  HEAD:  Atraumatic, Normocephalic  EYES: EOMI, PERRLA, conjunctiva and sclera clear  ENMT: No tonsillar erythema, exudates, or enlargement; Moist mucous membranes, Good dentition, No lesions  NECK: Supple, No JVD  NERVOUS SYSTEM:  Alert & Oriented X3, Good concentration; Motor Strength 4/5 B/L upper and lower extremities  CHEST/LUNG: Clear to percussion bilaterally; No rales, rhonchi, wheezing, or rubs  HEART: Regular rate and rhythm; No murmurs, rubs, or gallops  ABDOMEN: Soft, Nontender, Nondistended; Bowel sounds present  EXTREMITIES:  2+ Peripheral Pulses, No clubbing, cyanosis, or edema  SKIN: No rashes or lesions    LABS:                        8.7    6.62  )-----------( 287      ( 25 Jul 2018 07:11 )             28.0     07-25    146<H>  |  113<H>  |  37<H>  ----------------------------<  99  4.2   |  26  |  1.30    Ca    8.2<L>      25 Jul 2018 07:11    TPro  5.9<L>  /  Alb  2.5<L>  /  TBili  0.3  /  DBili  x   /  AST  18  /  ALT  40  /  AlkPhos  241<H>  07-25    PT/INR - ( 25 Jul 2018 07:11 )   PT: 16.0 sec;   INR: 1.46 ratio             CAPILLARY BLOOD GLUCOSE          07-22 @ 10:30   No growth to date.  --  --  07-21 @ 10:37   Growth in anaerobic bottle: Escherichia coli ESBL  "Due to technical problems, Proteus sp. will Not be reported as part of  the BCID panel until further notice"  ***Blood Panel PCR results on this specimen are available  approximately 3 hours afterthe Gram stain result.***  Gram stain, PCR, and/or culture results may not always  correspond due to difference in methodologies.  ************************************************************  This PCR assay was performed using [employer].  The following targets are tested for: Enterococcus,  vancomycin resistant enterococci, Listeria monocytogenes,  coagulase negative staphylococci, S. aureus,  methicillin resistant S. aureus, Streptococcus agalactiae  (Group B), S. pneumoniae, S. pyogenes (Group A),  Acinetobacter baumannii, Enterobacter cloacae, E. coli,  Klebsiella oxytoca, K. pneumoniae, Proteus sp.,  Serratia marcescens, Haemophilus influenzae,  Neisseria meningitidis, Pseudomonas aeruginosa, Candida  albicans, C. glabrata, C krusei, C parapsilosis,  C. tropicalis and the KPC resistance gene.  --  Blood Culture PCR  07-21 @ 10:33   No growth to date.  --  --          MEDICATIONS  (STANDING):  aspirin enteric coated 81 milliGRAM(s) Oral daily  atorvastatin 40 milliGRAM(s) Oral at bedtime  diltiazem    Tablet 30 milliGRAM(s) Oral every 6 hours  ertapenem  IVPB      ertapenem  IVPB 500 milliGRAM(s) IV Intermittent every 24 hours  gabapentin 100 milliGRAM(s) Oral three times a day  hydrALAZINE 50 milliGRAM(s) Oral three times a day  metoprolol tartrate 25 milliGRAM(s) Oral every 6 hours  mirtazapine 7.5 milliGRAM(s) Oral at bedtime  pantoprazole    Tablet 40 milliGRAM(s) Oral before breakfast    MEDICATIONS  (PRN):  acetaminophen   Tablet 650 milliGRAM(s) Oral every 8 hours PRN Mild Pain (1 - 3)  docusate sodium 100 milliGRAM(s) Oral two times a day PRN Constipation  polyethylene glycol 3350 17 Gram(s) Oral daily PRN Constipation  senna 2 Tablet(s) Oral at bedtime PRN Constipation

## 2018-07-25 NOTE — PROGRESS NOTE ADULT - SUBJECTIVE AND OBJECTIVE BOX
s/p ercp    occluded biliary stent s/p removal  multiple stones removed    no stent placed    advance diet as tolerated  surg f/u re ccy  message left for family

## 2018-07-25 NOTE — PROGRESS NOTE ADULT - PROBLEM SELECTOR PLAN 1
ESBL bacteremia  on invanz  afebrile , no leukocytosis.   repeat blood culture ngtd   ID Dr. armendariz following. will f/u recs for abx course. continue invanz x 10d, f/u repeat blood cx

## 2018-07-26 DIAGNOSIS — K80.50 CALCULUS OF BILE DUCT WITHOUT CHOLANGITIS OR CHOLECYSTITIS WITHOUT OBSTRUCTION: ICD-10-CM

## 2018-07-26 LAB
ALBUMIN SERPL ELPH-MCNC: 2.6 G/DL — LOW (ref 3.3–5)
ALP SERPL-CCNC: 265 U/L — HIGH (ref 40–120)
ALT FLD-CCNC: 45 U/L — SIGNIFICANT CHANGE UP (ref 12–78)
ANION GAP SERPL CALC-SCNC: 10 MMOL/L — SIGNIFICANT CHANGE UP (ref 5–17)
AST SERPL-CCNC: 23 U/L — SIGNIFICANT CHANGE UP (ref 15–37)
BASOPHILS # BLD AUTO: 0.01 K/UL — SIGNIFICANT CHANGE UP (ref 0–0.2)
BASOPHILS NFR BLD AUTO: 0.1 % — SIGNIFICANT CHANGE UP (ref 0–2)
BILIRUB SERPL-MCNC: 0.3 MG/DL — SIGNIFICANT CHANGE UP (ref 0.2–1.2)
BUN SERPL-MCNC: 45 MG/DL — HIGH (ref 7–23)
CALCIUM SERPL-MCNC: 8.6 MG/DL — SIGNIFICANT CHANGE UP (ref 8.5–10.1)
CHLORIDE SERPL-SCNC: 109 MMOL/L — HIGH (ref 96–108)
CO2 SERPL-SCNC: 26 MMOL/L — SIGNIFICANT CHANGE UP (ref 22–31)
CREAT SERPL-MCNC: 1.5 MG/DL — HIGH (ref 0.5–1.3)
CULTURE RESULTS: SIGNIFICANT CHANGE UP
EOSINOPHIL # BLD AUTO: 0 K/UL — SIGNIFICANT CHANGE UP (ref 0–0.5)
EOSINOPHIL NFR BLD AUTO: 0 % — SIGNIFICANT CHANGE UP (ref 0–6)
GLUCOSE SERPL-MCNC: 145 MG/DL — HIGH (ref 70–99)
HCT VFR BLD CALC: 30.8 % — LOW (ref 34.5–45)
HGB BLD-MCNC: 9.5 G/DL — LOW (ref 11.5–15.5)
IMM GRANULOCYTES NFR BLD AUTO: 0.6 % — SIGNIFICANT CHANGE UP (ref 0–1.5)
INR BLD: 1.3 RATIO — HIGH (ref 0.88–1.16)
LYMPHOCYTES # BLD AUTO: 0.68 K/UL — LOW (ref 1–3.3)
LYMPHOCYTES # BLD AUTO: 4.9 % — LOW (ref 13–44)
MCHC RBC-ENTMCNC: 25.9 PG — LOW (ref 27–34)
MCHC RBC-ENTMCNC: 30.8 GM/DL — LOW (ref 32–36)
MCV RBC AUTO: 83.9 FL — SIGNIFICANT CHANGE UP (ref 80–100)
MONOCYTES # BLD AUTO: 0.33 K/UL — SIGNIFICANT CHANGE UP (ref 0–0.9)
MONOCYTES NFR BLD AUTO: 2.4 % — SIGNIFICANT CHANGE UP (ref 2–14)
NEUTROPHILS # BLD AUTO: 12.64 K/UL — HIGH (ref 1.8–7.4)
NEUTROPHILS NFR BLD AUTO: 92 % — HIGH (ref 43–77)
PLATELET # BLD AUTO: 340 K/UL — SIGNIFICANT CHANGE UP (ref 150–400)
POTASSIUM SERPL-MCNC: 4.8 MMOL/L — SIGNIFICANT CHANGE UP (ref 3.5–5.3)
POTASSIUM SERPL-SCNC: 4.8 MMOL/L — SIGNIFICANT CHANGE UP (ref 3.5–5.3)
PROT SERPL-MCNC: 6.4 G/DL — SIGNIFICANT CHANGE UP (ref 6–8.3)
PROTHROM AB SERPL-ACNC: 14.3 SEC — HIGH (ref 9.8–12.7)
RBC # BLD: 3.67 M/UL — LOW (ref 3.8–5.2)
RBC # FLD: 17.8 % — HIGH (ref 10.3–14.5)
SODIUM SERPL-SCNC: 145 MMOL/L — SIGNIFICANT CHANGE UP (ref 135–145)
SPECIMEN SOURCE: SIGNIFICANT CHANGE UP
WBC # BLD: 13.74 K/UL — HIGH (ref 3.8–10.5)
WBC # FLD AUTO: 13.74 K/UL — HIGH (ref 3.8–10.5)

## 2018-07-26 PROCEDURE — 99232 SBSQ HOSP IP/OBS MODERATE 35: CPT

## 2018-07-26 PROCEDURE — 99233 SBSQ HOSP IP/OBS HIGH 50: CPT

## 2018-07-26 PROCEDURE — 76937 US GUIDE VASCULAR ACCESS: CPT | Mod: 26

## 2018-07-26 PROCEDURE — 77001 FLUOROGUIDE FOR VEIN DEVICE: CPT | Mod: 26

## 2018-07-26 PROCEDURE — 36569 INSJ PICC 5 YR+ W/O IMAGING: CPT

## 2018-07-26 RX ORDER — GLYCERIN ADULT
1 SUPPOSITORY, RECTAL RECTAL DAILY
Qty: 0 | Refills: 0 | Status: DISCONTINUED | OUTPATIENT
Start: 2018-07-26 | End: 2018-08-01

## 2018-07-26 RX ORDER — WARFARIN SODIUM 2.5 MG/1
5 TABLET ORAL ONCE
Qty: 0 | Refills: 0 | Status: COMPLETED | OUTPATIENT
Start: 2018-07-26 | End: 2018-07-26

## 2018-07-26 RX ORDER — SENNA PLUS 8.6 MG/1
2 TABLET ORAL AT BEDTIME
Qty: 0 | Refills: 0 | Status: DISCONTINUED | OUTPATIENT
Start: 2018-07-26 | End: 2018-08-01

## 2018-07-26 RX ORDER — POLYETHYLENE GLYCOL 3350 17 G/17G
17 POWDER, FOR SOLUTION ORAL
Qty: 0 | Refills: 0 | Status: DISCONTINUED | OUTPATIENT
Start: 2018-07-26 | End: 2018-08-01

## 2018-07-26 RX ORDER — ENOXAPARIN SODIUM 100 MG/ML
50 INJECTION SUBCUTANEOUS DAILY
Qty: 0 | Refills: 0 | Status: DISCONTINUED | OUTPATIENT
Start: 2018-07-26 | End: 2018-08-01

## 2018-07-26 RX ORDER — DOCUSATE SODIUM 100 MG
100 CAPSULE ORAL THREE TIMES A DAY
Qty: 0 | Refills: 0 | Status: DISCONTINUED | OUTPATIENT
Start: 2018-07-26 | End: 2018-08-01

## 2018-07-26 RX ADMIN — Medication 25 MILLIGRAM(S): at 14:36

## 2018-07-26 RX ADMIN — PANTOPRAZOLE SODIUM 40 MILLIGRAM(S): 20 TABLET, DELAYED RELEASE ORAL at 06:21

## 2018-07-26 RX ADMIN — GABAPENTIN 100 MILLIGRAM(S): 400 CAPSULE ORAL at 21:18

## 2018-07-26 RX ADMIN — Medication 25 MILLIGRAM(S): at 00:24

## 2018-07-26 RX ADMIN — GABAPENTIN 100 MILLIGRAM(S): 400 CAPSULE ORAL at 06:22

## 2018-07-26 RX ADMIN — Medication 50 MILLIGRAM(S): at 21:18

## 2018-07-26 RX ADMIN — Medication 50 MILLIGRAM(S): at 14:36

## 2018-07-26 RX ADMIN — Medication 50 MILLIGRAM(S): at 06:21

## 2018-07-26 RX ADMIN — GABAPENTIN 100 MILLIGRAM(S): 400 CAPSULE ORAL at 14:35

## 2018-07-26 RX ADMIN — ATORVASTATIN CALCIUM 40 MILLIGRAM(S): 80 TABLET, FILM COATED ORAL at 21:18

## 2018-07-26 RX ADMIN — Medication 100 MILLIGRAM(S): at 21:18

## 2018-07-26 RX ADMIN — Medication 81 MILLIGRAM(S): at 14:35

## 2018-07-26 RX ADMIN — Medication 25 MILLIGRAM(S): at 17:45

## 2018-07-26 RX ADMIN — WARFARIN SODIUM 5 MILLIGRAM(S): 2.5 TABLET ORAL at 21:18

## 2018-07-26 RX ADMIN — Medication 25 MILLIGRAM(S): at 06:22

## 2018-07-26 RX ADMIN — MIRTAZAPINE 7.5 MILLIGRAM(S): 45 TABLET, ORALLY DISINTEGRATING ORAL at 21:18

## 2018-07-26 RX ADMIN — ERTAPENEM SODIUM 100 MILLIGRAM(S): 1 INJECTION, POWDER, LYOPHILIZED, FOR SOLUTION INTRAMUSCULAR; INTRAVENOUS at 14:37

## 2018-07-26 RX ADMIN — Medication 25 MILLIGRAM(S): at 23:17

## 2018-07-26 NOTE — PROGRESS NOTE ADULT - PROBLEM SELECTOR PLAN 3
- PAF. continue cardizem and metoprolol   - INR subtherapeutic . dose coumadin . am inr; will bridge with lovenox until therapeutic above 2, renally dosed  -apprec cardio recs

## 2018-07-26 NOTE — PROGRESS NOTE ADULT - SUBJECTIVE AND OBJECTIVE BOX
pt seen  no complaints  ERCP done yesterday  ICU Vital Signs Last 24 Hrs  T(C): 36.3 (26 Jul 2018 05:14), Max: 36.6 (25 Jul 2018 13:51)  T(F): 97.4 (26 Jul 2018 05:14), Max: 97.9 (25 Jul 2018 13:51)  HR: 80 (26 Jul 2018 06:19) (66 - 90)  BP: 116/65 (26 Jul 2018 06:19) (100/60 - 150/57)  BP(mean): --  ABP: --  ABP(mean): --  RR: 17 (26 Jul 2018 05:14) (11 - 18)  SpO2: 94% (26 Jul 2018 05:14) (94% - 100%)  gen-NAD  resp-clear  abd-soft NT/ND                          9.5    13.74 )-----------( 340      ( 26 Jul 2018 07:17 )             30.8   07-26    145  |  109<H>  |  45<H>  ----------------------------<  145<H>  4.8   |  26  |  1.50<H>    Ca    8.6      26 Jul 2018 07:17    TPro  6.4  /  Alb  2.6<L>  /  TBili  0.3  /  DBili  x   /  AST  23  /  ALT  45  /  AlkPhos  265<H>  07-26

## 2018-07-26 NOTE — PROGRESS NOTE ADULT - ASSESSMENT
82 yo with hx of CBD stones, s/p ERCP x2      pt refusing lap rox at this time, understands risks involved with recurrent cBD stones and possible cholangitis and sepsis      cont abx

## 2018-07-26 NOTE — PROGRESS NOTE ADULT - SUBJECTIVE AND OBJECTIVE BOX
The patient was interviewed and evaluated.    83y Female    T(C): 36.3 (07-26-18 @ 05:14), Max: 36.6 (07-25-18 @ 12:04)  HR: 80 (07-26-18 @ 06:19) (66 - 90)  BP: 116/65 (07-26-18 @ 06:19) (100/60 - 150/57)  RR: 17 (07-26-18 @ 05:14) (11 - 18)  SpO2: 94% (07-26-18 @ 05:14) (94% - 100%)  Wt(kg): --    No Nausea/vomiting, recall, sore throat or headache.    No anesthesia related complaints or sequelae.    No additional recommendations.

## 2018-07-26 NOTE — PROGRESS NOTE ADULT - SUBJECTIVE AND OBJECTIVE BOX
[facility] -- Dahiana Bañuelos, Toney Horner, Truman Falcon Savella  Office # [phone]    Follow Up:      Subjective/Observations:   Sitting on the chair, on RA.  Denies CP, SOB, palpitations.  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  Osteoarthritis  Afib  PVD (peripheral vascular disease)  Neuropathy: (Right lower leg)  H/O cerebral aneurysm repair: brain clips  CVA (cerebral vascular accident): (&quot;Mini-stroke&quot;,1990&#x27;s)  Rheumatoid arthritis  Hyperlipidemia  Hypertension  S/P cataract surgery: (Left eye)  Elective surgery: (&quot;Twisted bowel&quot;, 2014)  Elective surgery: (Exision of cyst on liver, 1985)  S/P ORIF (open reduction internal fixation) fracture: Left hip fx (2012) &amp; R hip fx (2013)  H/O cerebral aneurysm repair: Brain clips (1978(  Renal stone: Cysto stent placement 10/1/2014  PVD (peripheral vascular disease): s/p RLE bypass x 3, most recent 3/2012 Right external iliac to PT bypass w/ PTFE (2012)  S/P FRED (total abdominal hysterectomy): (1987, Hx of &quot;ovarian cancer?&quot;)    MEDICATIONS  (STANDING):  aspirin enteric coated 81 milliGRAM(s) Oral daily  atorvastatin 40 milliGRAM(s) Oral at bedtime  diltiazem    Tablet 30 milliGRAM(s) Oral every 6 hours  docusate sodium 100 milliGRAM(s) Oral three times a day  ertapenem  IVPB      ertapenem  IVPB 500 milliGRAM(s) IV Intermittent every 24 hours  gabapentin 100 milliGRAM(s) Oral three times a day  glycerin Suppository - Adult 1 Suppository(s) Rectal daily  hydrALAZINE 50 milliGRAM(s) Oral three times a day  metoprolol tartrate 25 milliGRAM(s) Oral every 6 hours  mirtazapine 7.5 milliGRAM(s) Oral at bedtime  pantoprazole    Tablet 40 milliGRAM(s) Oral before breakfast  polyethylene glycol 3350 17 Gram(s) Oral two times a day  senna 2 Tablet(s) Oral at bedtime    MEDICATIONS  (PRN):  acetaminophen   Tablet 650 milliGRAM(s) Oral every 8 hours PRN Mild Pain (1 - 3)    Allergies    No Known Allergies    Intolerances    Vital Signs Last 24 Hrs  T(C): 36.3 (26 Jul 2018 05:14), Max: 36.6 (25 Jul 2018 12:04)  T(F): 97.4 (26 Jul 2018 05:14), Max: 97.9 (25 Jul 2018 12:04)  HR: 80 (26 Jul 2018 06:19) (66 - 90)  BP: 116/65 (26 Jul 2018 06:19) (100/60 - 150/57)  BP(mean): --  RR: 17 (26 Jul 2018 05:14) (11 - 18)  SpO2: 94% (26 Jul 2018 05:14) (94% - 100%)    I&O's Summary    25 Jul 2018 07:01  -  26 Jul 2018 07:00  --------------------------------------------------------  IN: 110 mL / OUT: 0 mL / NET: 110 mL    PHYSICAL EXAM:  TELE: Not on tele  Constitutional: NAD, awake and alert, well-developed  HEENT: Moist Mucous Membranes, Anicteric  Pulmonary: Non-labored, breath sounds are clear bilaterally, No wheezing, rales or rhonchi  Cardiovascular: Regular, S1 and S2, No murmurs, rubs, gallops or clicks  Gastrointestinal: Bowel Sounds present, soft, nontender.   Lymph: No peripheral edema. No lymphadenopathy.  Skin: No visible rashes or ulcers.  Psych:  Mood & affect appropriate    LABS: All Labs Reviewed:                        9.5    13.74 )-----------( 340      ( 26 Jul 2018 07:17 )             30.8                         8.7    6.62  )-----------( 287      ( 25 Jul 2018 07:11 )             28.0                         9.5    6.40  )-----------( 297      ( 24 Jul 2018 07:52 )             30.3     26 Jul 2018 07:17    145    |  109    |  45     ----------------------------<  145    4.8     |  26     |  1.50   25 Jul 2018 07:11    146    |  113    |  37     ----------------------------<  99     4.2     |  26     |  1.30   24 Jul 2018 07:52    145    |  111    |  29     ----------------------------<  89     4.1     |  26     |  1.20     Ca    8.6        26 Jul 2018 07:17  Ca    8.2        25 Jul 2018 07:11  Ca    8.4        24 Jul 2018 07:52    TPro  6.4    /  Alb  2.6    /  TBili  0.3    /  DBili  x      /  AST  23     /  ALT  45     /  AlkPhos  265    26 Jul 2018 07:17  TPro  5.9    /  Alb  2.5    /  TBili  0.3    /  DBili  x      /  AST  18     /  ALT  40     /  AlkPhos  241    25 Jul 2018 07:11  TPro  6.3    /  Alb  2.6    /  TBili  0.4    /  DBili  x      /  AST  20     /  ALT  45     /  AlkPhos  248    24 Jul 2018 07:52    PT/INR - ( 26 Jul 2018 07:17 )   PT: 14.3 sec;   INR: 1.30 ratio      < from: Xray Chest 1 View AP/PA (07.20.18 @ 21:42) >    EXAM:  XR CHEST AP OR PA 1V                            PROCEDURE DATE:  07/20/2018      INTERPRETATION:  CLINICAL INFORMATION: Fever, question pneumonia.    A single portable view of the chest was obtained.     Comparison: Chest x-ray 2/5/2018    The mediastinal cardiac silhouette is unremarkable. Prominent aortic   calcifications.    The lungs are clear.     No acute osseous finding.     IMPRESSION:    Clear lungs.    ROCK BLUE M.D., ATTENDING RADIOLOGIST  This document has been electronically signed. Jul 21 2018  8:59AM      < end of copied text >    < from: CT Abdomen and Pelvis w/ IV Cont (07.21.18 @ 00:34) >    EXAM:  CT ABDOMEN AND PELVIS IC                            PROCEDURE DATE:  07/21/2018          INTERPRETATION:  CLINICAL INFORMATION: Fever and diarrhea    COMPARISON: CT abdomen and pelvis 10/31/2017    PROCEDURE:   CT of the Abdomen and Pelvis was performed with intravenous contrast.   Intravenous contrast: 96 ml Omnipaque 350. 4 ml discarded.  Oral contrast: positive contrast was administered.  Sagittal and coronal reformats were performed.    FINDINGS:    LOWER CHEST: Within normal limits.    LIVER: Within normal limits.  BILE DUCTS: Pneumobilia. Mild intrahepatic bile duct dilatation. Common   bile duct stent is seen with distal tip in the duodenal lumen..  GALLBLADDER: Distended gallbladder containing stones.  SPLEEN: Within normal limits.  PANCREAS: Within normal limits.  ADRENALS: 1.4 cm indeterminate right lateral.  KIDNEYS/URETERS: Mild bilateral renal atrophy. Right renal cyst.   Prominent bilateral extrarenal pelves .    BLADDER: Within normal limits.  REPRODUCTIVE ORGANS: Status post hysterectomy.     BOWEL: No bowel obstruction. Appendix normal. Right lower quadrant small   bowel anastomosis seen. Diverticulosis of the descending and sigmoid   colon.  PERITONEUM: No ascites.  VESSELS:  Stable infrarenal abdominal aortic aneurysm measures 3.7 x 2.7   cm. Redemonstration of 3 occluded bypass graft extending from the right   external iliac artery down the leg.  RETROPERITONEUM: No lymphadenopathy.    ABDOMINAL WALL: Within normal limits.  BONES: Degenerative changes of the spine. Bilateral femoral rods and   dynamic hip screws..    IMPRESSION: Distended gallbladder containing stones. Mild intrahepatic   bile duct dilatation with a common bile duct stent in place.      DWAYNE PARNELL M.D., ATTENDING RADIOLOGIST  This document has been electronically signed. Jul 21 2018  1:28AM      < end of copied text > [facility] -- Dahiana Bañuelos, Toney Horner, Truman Falcon Savella  Office # [phone]    Follow Up:  MR, AF    Subjective/Observations:   Sitting on the chair, on RA.  Denies CP, SOB, palpitations.  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  Osteoarthritis  Afib  PVD (peripheral vascular disease)  Neuropathy: (Right lower leg)  H/O cerebral aneurysm repair: brain clips  CVA (cerebral vascular accident): (&quot;Mini-stroke&quot;,1990&#x27;s)  Rheumatoid arthritis  Hyperlipidemia  Hypertension  S/P cataract surgery: (Left eye)  Elective surgery: (&quot;Twisted bowel&quot;, 2014)  Elective surgery: (Exision of cyst on liver, 1985)  S/P ORIF (open reduction internal fixation) fracture: Left hip fx (2012) &amp; R hip fx (2013)  H/O cerebral aneurysm repair: Brain clips (1978(  Renal stone: Cysto stent placement 10/1/2014  PVD (peripheral vascular disease): s/p RLE bypass x 3, most recent 3/2012 Right external iliac to PT bypass w/ PTFE (2012)  S/P FRED (total abdominal hysterectomy): (1987, Hx of &quot;ovarian cancer?&quot;)    MEDICATIONS  (STANDING):  aspirin enteric coated 81 milliGRAM(s) Oral daily  atorvastatin 40 milliGRAM(s) Oral at bedtime  diltiazem    Tablet 30 milliGRAM(s) Oral every 6 hours  docusate sodium 100 milliGRAM(s) Oral three times a day  ertapenem  IVPB      ertapenem  IVPB 500 milliGRAM(s) IV Intermittent every 24 hours  gabapentin 100 milliGRAM(s) Oral three times a day  glycerin Suppository - Adult 1 Suppository(s) Rectal daily  hydrALAZINE 50 milliGRAM(s) Oral three times a day  metoprolol tartrate 25 milliGRAM(s) Oral every 6 hours  mirtazapine 7.5 milliGRAM(s) Oral at bedtime  pantoprazole    Tablet 40 milliGRAM(s) Oral before breakfast  polyethylene glycol 3350 17 Gram(s) Oral two times a day  senna 2 Tablet(s) Oral at bedtime    MEDICATIONS  (PRN):  acetaminophen   Tablet 650 milliGRAM(s) Oral every 8 hours PRN Mild Pain (1 - 3)    Allergies    No Known Allergies    Intolerances    Vital Signs Last 24 Hrs  T(C): 36.3 (26 Jul 2018 05:14), Max: 36.6 (25 Jul 2018 12:04)  T(F): 97.4 (26 Jul 2018 05:14), Max: 97.9 (25 Jul 2018 12:04)  HR: 80 (26 Jul 2018 06:19) (66 - 90)  BP: 116/65 (26 Jul 2018 06:19) (100/60 - 150/57)  BP(mean): --  RR: 17 (26 Jul 2018 05:14) (11 - 18)  SpO2: 94% (26 Jul 2018 05:14) (94% - 100%)    I&O's Summary    25 Jul 2018 07:01  -  26 Jul 2018 07:00  --------------------------------------------------------  IN: 110 mL / OUT: 0 mL / NET: 110 mL    PHYSICAL EXAM:  TELE: Not on tele  Constitutional: NAD, awake and alert, well-developed  HEENT: Moist Mucous Membranes, Anicteric  Pulmonary: Non-labored, breath sounds are clear bilaterally, No wheezing, rales or rhonchi  Cardiovascular: Regular, S1 and S2, No murmurs, rubs, gallops or clicks  Gastrointestinal: Bowel Sounds present, soft, nontender.   Lymph: No peripheral edema. No lymphadenopathy.  Skin: No visible rashes or ulcers.  Psych:  Mood & affect appropriate    LABS: All Labs Reviewed:                        9.5    13.74 )-----------( 340      ( 26 Jul 2018 07:17 )             30.8                         8.7    6.62  )-----------( 287      ( 25 Jul 2018 07:11 )             28.0                         9.5    6.40  )-----------( 297      ( 24 Jul 2018 07:52 )             30.3     26 Jul 2018 07:17    145    |  109    |  45     ----------------------------<  145    4.8     |  26     |  1.50   25 Jul 2018 07:11    146    |  113    |  37     ----------------------------<  99     4.2     |  26     |  1.30   24 Jul 2018 07:52    145    |  111    |  29     ----------------------------<  89     4.1     |  26     |  1.20     Ca    8.6        26 Jul 2018 07:17  Ca    8.2        25 Jul 2018 07:11  Ca    8.4        24 Jul 2018 07:52    TPro  6.4    /  Alb  2.6    /  TBili  0.3    /  DBili  x      /  AST  23     /  ALT  45     /  AlkPhos  265    26 Jul 2018 07:17  TPro  5.9    /  Alb  2.5    /  TBili  0.3    /  DBili  x      /  AST  18     /  ALT  40     /  AlkPhos  241    25 Jul 2018 07:11  TPro  6.3    /  Alb  2.6    /  TBili  0.4    /  DBili  x      /  AST  20     /  ALT  45     /  AlkPhos  248    24 Jul 2018 07:52    PT/INR - ( 26 Jul 2018 07:17 )   PT: 14.3 sec;   INR: 1.30 ratio      < from: Xray Chest 1 View AP/PA (07.20.18 @ 21:42) >    EXAM:  XR CHEST AP OR PA 1V                            PROCEDURE DATE:  07/20/2018      INTERPRETATION:  CLINICAL INFORMATION: Fever, question pneumonia.    A single portable view of the chest was obtained.     Comparison: Chest x-ray 2/5/2018    The mediastinal cardiac silhouette is unremarkable. Prominent aortic   calcifications.    The lungs are clear.     No acute osseous finding.     IMPRESSION:    Clear lungs.    ROCK BLUE M.D., ATTENDING RADIOLOGIST  This document has been electronically signed. Jul 21 2018  8:59AM      < end of copied text >    < from: CT Abdomen and Pelvis w/ IV Cont (07.21.18 @ 00:34) >    EXAM:  CT ABDOMEN AND PELVIS IC                            PROCEDURE DATE:  07/21/2018          INTERPRETATION:  CLINICAL INFORMATION: Fever and diarrhea    COMPARISON: CT abdomen and pelvis 10/31/2017    PROCEDURE:   CT of the Abdomen and Pelvis was performed with intravenous contrast.   Intravenous contrast: 96 ml Omnipaque 350. 4 ml discarded.  Oral contrast: positive contrast was administered.  Sagittal and coronal reformats were performed.    FINDINGS:    LOWER CHEST: Within normal limits.    LIVER: Within normal limits.  BILE DUCTS: Pneumobilia. Mild intrahepatic bile duct dilatation. Common   bile duct stent is seen with distal tip in the duodenal lumen..  GALLBLADDER: Distended gallbladder containing stones.  SPLEEN: Within normal limits.  PANCREAS: Within normal limits.  ADRENALS: 1.4 cm indeterminate right lateral.  KIDNEYS/URETERS: Mild bilateral renal atrophy. Right renal cyst.   Prominent bilateral extrarenal pelves .    BLADDER: Within normal limits.  REPRODUCTIVE ORGANS: Status post hysterectomy.     BOWEL: No bowel obstruction. Appendix normal. Right lower quadrant small   bowel anastomosis seen. Diverticulosis of the descending and sigmoid   colon.  PERITONEUM: No ascites.  VESSELS:  Stable infrarenal abdominal aortic aneurysm measures 3.7 x 2.7   cm. Redemonstration of 3 occluded bypass graft extending from the right   external iliac artery down the leg.  RETROPERITONEUM: No lymphadenopathy.    ABDOMINAL WALL: Within normal limits.  BONES: Degenerative changes of the spine. Bilateral femoral rods and   dynamic hip screws..    IMPRESSION: Distended gallbladder containing stones. Mild intrahepatic   bile duct dilatation with a common bile duct stent in place.      DWAYNE PARNELL M.D., ATTENDING RADIOLOGIST  This document has been electronically signed. Jul 21 2018  1:28AM      < end of copied text >

## 2018-07-26 NOTE — PROGRESS NOTE ADULT - PROBLEM SELECTOR PLAN 1
pt w hx of ercp w stent placement and biliary sepsis  US showed choledocholithiasis and cholelithiasis  sp ercp 7/25- occluded stent removed, multiple stones extracted  await am labs   surgery eval appreciated, f/u recs, pt/family refusing lap rox at present  cont abx  repeat bld cxs ngtd  f/u ID recs  trend labs

## 2018-07-26 NOTE — PROGRESS NOTE ADULT - PROBLEM SELECTOR PLAN 7
- continue aspirin, neurontinin  - per vascular no limb threatening ischemia at this time.   - continue coumadin.   - may require amputation in the future.

## 2018-07-26 NOTE — BRIEF OPERATIVE NOTE - PROCEDURE
<<-----Click on this checkbox to enter Procedure Central line placement  07/26/2018    Active  USMAN

## 2018-07-26 NOTE — PROGRESS NOTE ADULT - SUBJECTIVE AND OBJECTIVE BOX
Patient is a 83y old  Female who presents with a chief complaint of fever, x 1 week, weakness (23 Jul 2018 10:18)      INTERVAL HPI:  OVERNIGHT EVENTS:  T(F): 97.7 (07-26-18 @ 20:32), Max: 97.7 (07-26-18 @ 20:32)  HR: 70 (07-26-18 @ 20:32) (70 - 90)  BP: 116/71 (07-26-18 @ 20:32) (100/60 - 131/77)  RR: 17 (07-26-18 @ 20:32) (17 - 18)  SpO2: 96% (07-26-18 @ 20:32) (94% - 96%)  I&O's Summary    25 Jul 2018 07:01  -  26 Jul 2018 07:00  --------------------------------------------------------  IN: 110 mL / OUT: 0 mL / NET: 110 mL        REVIEW OF SYSTEMS:  CONSTITUTIONAL: No fever, weight loss, or fatigue  EYES: No eye pain, visual disturbances, or discharge  ENMT:  No difficulty hearing, tinnitus, vertigo; No sinus or throat pain  NECK: No pain or stiffness  BREASTS: No pain, masses, or nipple discharge  RESPIRATORY: No cough, wheezing, chills or hemoptysis; No shortness of breath  CARDIOVASCULAR: No chest pain, palpitations, dizziness, or leg swelling  GASTROINTESTINAL: No abdominal or epigastric pain. No nausea, vomiting, or hematemesis; No diarrhea or constipation. No melena or hematochezia.  GENITOURINARY: No dysuria, frequency, hematuria, or incontinence  NEUROLOGICAL: No headaches, memory loss, loss of strength, numbness, or tremors  SKIN: No itching, burning, rashes, or lesions   LYMPH NODES: No enlarged glands  ENDOCRINE: No heat or cold intolerance; No hair loss  MUSCULOSKELETAL: No joint pain or swelling; No muscle, back, or extremity pain  PSYCHIATRIC: No depression, anxiety, mood swings, or difficulty sleeping  HEME/LYMPH: No easy bruising, or bleeding gums  ALLERY AND IMMUNOLOGIC: No hives or eczema    PHYSICAL EXAM:  GENERAL: NAD, well-groomed, well-developed  HEAD:  Atraumatic, Normocephalic  EYES: EOMI, PERRLA, conjunctiva and sclera clear  ENMT: No tonsillar erythema, exudates, or enlargement; Moist mucous membranes, Good dentition, No lesions  NECK: Supple, No JVD, Normal thyroid  NERVOUS SYSTEM:  Alert & Oriented X3, Good concentration; Motor Strength 5/5 B/L upper and lower extremities; DTRs 2+ intact and symmetric  CHEST/LUNG: Clear to percussion bilaterally; No rales, rhonchi, wheezing, or rubs  HEART: Regular rate and rhythm; No murmurs, rubs, or gallops  ABDOMEN: Soft, Nontender, Nondistended; Bowel sounds present  EXTREMITIES:  2+ Peripheral Pulses, No clubbing, cyanosis, or edema  LYMPH: No lymphadenopathy noted  SKIN: No rashes or lesions    LABS:                        9.5    13.74 )-----------( 340      ( 26 Jul 2018 07:17 )             30.8     07-26    145  |  109<H>  |  45<H>  ----------------------------<  145<H>  4.8   |  26  |  1.50<H>    Ca    8.6      26 Jul 2018 07:17    TPro  6.4  /  Alb  2.6<L>  /  TBili  0.3  /  DBili  x   /  AST  23  /  ALT  45  /  AlkPhos  265<H>  07-26    PT/INR - ( 26 Jul 2018 07:17 )   PT: 14.3 sec;   INR: 1.30 ratio             CAPILLARY BLOOD GLUCOSE                  MEDICATIONS  (STANDING):  aspirin enteric coated 81 milliGRAM(s) Oral daily  atorvastatin 40 milliGRAM(s) Oral at bedtime  diltiazem    Tablet 30 milliGRAM(s) Oral every 6 hours  docusate sodium 100 milliGRAM(s) Oral three times a day  enoxaparin Injectable 50 milliGRAM(s) SubCutaneous daily  ertapenem  IVPB      ertapenem  IVPB 500 milliGRAM(s) IV Intermittent every 24 hours  gabapentin 100 milliGRAM(s) Oral three times a day  glycerin Suppository - Adult 1 Suppository(s) Rectal daily  hydrALAZINE 50 milliGRAM(s) Oral three times a day  metoprolol tartrate 25 milliGRAM(s) Oral every 6 hours  mirtazapine 7.5 milliGRAM(s) Oral at bedtime  pantoprazole    Tablet 40 milliGRAM(s) Oral before breakfast  polyethylene glycol 3350 17 Gram(s) Oral two times a day  senna 2 Tablet(s) Oral at bedtime    MEDICATIONS  (PRN):  acetaminophen   Tablet 650 milliGRAM(s) Oral every 8 hours PRN Mild Pain (1 - 3) Patient is a 83y old  Female who presents with a chief complaint of fever, x 1 week, weakness (23 Jul 2018 10:18)      INTERVAL HPI: Pt seen and examined. States she feels tired and is frustrated as she wishes she could just feel better and go home, denies any other acute complaints at this time.     OVERNIGHT EVENTS: none noted  T(F): 97.7 (07-26-18 @ 20:32), Max: 97.7 (07-26-18 @ 20:32)  HR: 70 (07-26-18 @ 20:32) (70 - 90)  BP: 116/71 (07-26-18 @ 20:32) (100/60 - 131/77)  RR: 17 (07-26-18 @ 20:32) (17 - 18)  SpO2: 96% (07-26-18 @ 20:32) (94% - 96%)  I&O's Summary    25 Jul 2018 07:01  -  26 Jul 2018 07:00  --------------------------------------------------------  IN: 110 mL / OUT: 0 mL / NET: 110 mL        REVIEW OF SYSTEMS: 12 systems noncontributory other than that stated in hpi    PHYSICAL EXAM:  GENERAL: NAD, elder, frail  HEAD:  Atraumatic, Normocephalic  EYES: EOMI, PERRLA, conjunctiva and sclera clear  ENMT: No tonsillar erythema, exudates, or enlargement; Moist mucous membranes, Good dentition, No lesions  NECK: Supple, No JVD,   NERVOUS SYSTEM:  Alert & Oriented X3, Good concentration; Motor Strength 5/5 B/L upper and lower extremities  CHEST/LUNG: Clear to percussion bilaterally; No rales, rhonchi, wheezing, or rubs  HEART: Regular rate and rhythm; No murmurs, rubs, or gallops  ABDOMEN: Soft, Nontender, Nondistended; Bowel sounds present  EXTREMITIES:  2+ Peripheral Pulses, No clubbing, cyanosis, or edema  SKIN: No rashes or lesions    LABS:                        9.5    13.74 )-----------( 340      ( 26 Jul 2018 07:17 )             30.8     07-26    145  |  109<H>  |  45<H>  ----------------------------<  145<H>  4.8   |  26  |  1.50<H>    Ca    8.6      26 Jul 2018 07:17    TPro  6.4  /  Alb  2.6<L>  /  TBili  0.3  /  DBili  x   /  AST  23  /  ALT  45  /  AlkPhos  265<H>  07-26    PT/INR - ( 26 Jul 2018 07:17 )   PT: 14.3 sec;   INR: 1.30 ratio             CAPILLARY BLOOD GLUCOSE                  MEDICATIONS  (STANDING):  aspirin enteric coated 81 milliGRAM(s) Oral daily  atorvastatin 40 milliGRAM(s) Oral at bedtime  diltiazem    Tablet 30 milliGRAM(s) Oral every 6 hours  docusate sodium 100 milliGRAM(s) Oral three times a day  enoxaparin Injectable 50 milliGRAM(s) SubCutaneous daily  ertapenem  IVPB      ertapenem  IVPB 500 milliGRAM(s) IV Intermittent every 24 hours  gabapentin 100 milliGRAM(s) Oral three times a day  glycerin Suppository - Adult 1 Suppository(s) Rectal daily  hydrALAZINE 50 milliGRAM(s) Oral three times a day  metoprolol tartrate 25 milliGRAM(s) Oral every 6 hours  mirtazapine 7.5 milliGRAM(s) Oral at bedtime  pantoprazole    Tablet 40 milliGRAM(s) Oral before breakfast  polyethylene glycol 3350 17 Gram(s) Oral two times a day  senna 2 Tablet(s) Oral at bedtime    MEDICATIONS  (PRN):  acetaminophen   Tablet 650 milliGRAM(s) Oral every 8 hours PRN Mild Pain (1 - 3)

## 2018-07-26 NOTE — PROGRESS NOTE ADULT - SUBJECTIVE AND OBJECTIVE BOX
CARITO CONSTANTINO is a 83yFemale , patient examined and chart reviewed.     INTERVAL HPI/ OVERNIGHT EVENTS:   Awake Alert.   Sp PICC.     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  Osteoarthritis  Afib  PVD (peripheral vascular disease)  Neuropathy: (Right lower leg)  H/O cerebral aneurysm repair: brain clips  CVA (cerebral vascular accident): (&quot;Mini-stroke&quot;,1990&#x27;s)  Rheumatoid arthritis  Hyperlipidemia  Hypertension  S/P cataract surgery: (Left eye)  Elective surgery: (&quot;Twisted bowel&quot;, 2014)  Elective surgery: (Exision of cyst on liver, 1985)  S/P ORIF (open reduction internal fixation) fracture: Left hip fx (2012) &amp; R hip fx (2013)  H/O cerebral aneurysm repair: Brain clips (1978(  Renal stone: Cysto stent placement 10/1/2014  PVD (peripheral vascular disease): s/p RLE bypass x 3, most recent 3/2012 Right external iliac to PT bypass w/ PTFE (2012)  S/P FRED (total abdominal hysterectomy): (1987, Hx of &quot;ovarian cancer?&quot;)      For details regarding the patient's social history, family history, and other miscellaneous elements, please refer the initial infectious diseases consultation and/or the admitting history and physical examination for this admission.    ROS:  CONSTITUTIONAL:  Negative fever or chills  EYES:  Negative  blurry vision or double vision  CARDIOVASCULAR:  Negative for chest pain or palpitations  RESPIRATORY:  Negative for cough, wheezing, or SOB   GASTROINTESTINAL:  Negative for nausea, vomiting, diarrhea, constipation, or abdominal pain  GENITOURINARY:  Negative frequency, urgency or dysuria  NEUROLOGIC:  No headache, confusion, dizziness, lightheadedness  All other systems were reviewed and are negative     NKDA    Current inpatient medications :    ANTIBIOTICS/RELEVANT:  ertapenem  IVPB      ertapenem  IVPB 500 milliGRAM(s) IV Intermittent every 24 hours      acetaminophen   Tablet 650 milliGRAM(s) Oral every 8 hours PRN  aspirin enteric coated 81 milliGRAM(s) Oral daily  atorvastatin 40 milliGRAM(s) Oral at bedtime  diltiazem    Tablet 30 milliGRAM(s) Oral every 6 hours  docusate sodium 100 milliGRAM(s) Oral three times a day  gabapentin 100 milliGRAM(s) Oral three times a day  glycerin Suppository - Adult 1 Suppository(s) Rectal daily  hydrALAZINE 50 milliGRAM(s) Oral three times a day  metoprolol tartrate 25 milliGRAM(s) Oral every 6 hours  mirtazapine 7.5 milliGRAM(s) Oral at bedtime  pantoprazole    Tablet 40 milliGRAM(s) Oral before breakfast  polyethylene glycol 3350 17 Gram(s) Oral two times a day  senna 2 Tablet(s) Oral at bedtime      Objective:    07-25 @ 07:01  -  07-26 @ 07:00  --------------------------------------------------------  IN: 110 mL / OUT: 0 mL / NET: 110 mL      T(C): 36.4 (07-26-18 @ 13:56), Max: 36.4 (07-25-18 @ 21:59)  HR: 72 (07-26-18 @ 13:56) (72 - 90)  BP: 121/77 (07-26-18 @ 13:56) (100/60 - 131/77)  RR: 17 (07-26-18 @ 13:56) (17 - 18)  SpO2: 94% (07-26-18 @ 13:56) (94% - 94%)  Wt(kg): --      Physical Exam:  General: no acute distress  Eyes: sclera anicteric, pupils equal and reactive to light  ENMT: buccal mucosa moist, pharynx not injected  Neck: supple, trachea midline  Lungs: clear, no wheeze/rhonchi  Cardiovascular: regular rate and rhythm, S1 S2  Abdomen: soft, nontender, no organomegaly present, bowel sounds normal  Neurological: alert and oriented x3, Cranial Nerves II-XII grossly intact  Skin: no increased ecchymosis/petechiae/purpura  Lymph Nodes: no palpable cervical/supraclavicular lymph nodes enlargements  Extremities: no cyanosis/clubbing/edema      LABS:                          9.5    13.74 )-----------( 340      ( 26 Jul 2018 07:17 )             30.8       07-26    145  |  109<H>  |  45<H>  ----------------------------<  145<H>  4.8   |  26  |  1.50<H>    Ca    8.6      26 Jul 2018 07:17    TPro  6.4  /  Alb  2.6<L>  /  TBili  0.3  /  DBili  x   /  AST  23  /  ALT  45  /  AlkPhos  265<H>  07-26      PT/INR - ( 26 Jul 2018 07:17 )   PT: 14.3 sec;   INR: 1.30 ratio      MICROBIOLOGY:  Culture - Blood in AM (07.22.18 @ 10:30)    Specimen Source: .Blood Blood-Venous    Culture Results:   No growth to date.    Culture - Blood (07.21.18 @ 10:37)    -  Meropenem: S <=1    -  Trimethoprim/Sulfamethoxazole: R >2/38    -  Gentamicin: S <=1    -  Imipenem: S <=1    -  Levofloxacin: R >4    Gram Stain:   Growth in anaerobic bottle: Gram Negative Rods    -  Amikacin: S <=8    -  Ampicillin: R >16 These ampicillin results predict results for amoxicillin    -  Aztreonam: R >16    -  Cefazolin: R >16    -  Cefoxitin: S 8    -  Ciprofloxacin: R >2    -  Ertapenem: S <=0.5    -  Cefepime: R >16    -  Piperacillin/Tazobactam: R <=8    -  Tobramycin: R >8    -  Escherichia coli: Detec    -  Ampicillin/Sulbactam: R >16/8    -  Ceftriaxone: R >32 Enterobacter, Citrobacter, and Serratia may develop resistance during prolonged therapy    Specimen Source: .Blood Blood-Peripheral    Organism: Blood Culture PCR    Organism: Escherichia coli ESBL    Culture Results:   Growth in anaerobic bottle: Escherichia coli ESBL  "Due to technical problems, Proteus sp. will Not be reported as part of  the BCID panel until further notice"  ***Blood Panel PCR results on this specimen are available  approximately 3 hours afterthe Gram stain result.***  Gram stain, PCR, and/or culture results may not always  correspond due to difference in methodologies.  ************************************************************  This PCR assay was performed using [employer].  The following targets are tested for: Enterococcus,  vancomycin resistant enterococci, Listeria monocytogenes,  coagulase negative staphylococci, S. aureus,  methicillin resistant S. aureus, Streptococcus agalactiae  (Group B), S. pneumoniae, S. pyogenes (Group A),  Acinetobacter baumannii, Enterobacter cloacae, E. coli,  Klebsiella oxytoca, K. pneumoniae, Proteus sp.,  Serratia marcescens, Haemophilus influenzae,  Neisseria meningitidis, Pseudomonas aeruginosa, Candida  albicans, C. glabrata, C krusei, C parapsilosis,  C. tropicalis and the KPC resistance gene.    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  Appropriate use of antibiotics and adverse effects reviewed.      I have discussed the above plan of care with patient/ family in detail. They expressed understanding of the  treatment plan . Risks, benefits and alternatives discussed in detail. I have asked if they have any questions or concerns and appropriately addressed them to the best of my ability .    > 35 minutes were spent in direct patient care reviewing notes, medications ,labs data/ imaging , discussion with multidisciplinary team.    Thank you for allowing me to participate in care of your patient .    Cynthia Jaimes MD  Infectious Disease  [phone]

## 2018-07-26 NOTE — PROGRESS NOTE ADULT - PROBLEM SELECTOR PLAN 1
ESBL bacteremia  on invanz  afebrile , no leukocytosis.   repeat blood culture ngtd   ID Dr. armendariz following. will f/u recs for abx course. continue invanz x 10d, f/u repeat blood cx, PICC today, will likely need rehab for iv antibx as pt unable to complete antibx at home apprec sw/cm recs

## 2018-07-26 NOTE — PROGRESS NOTE ADULT - ASSESSMENT
84 yo female w/ PMH of PAF on AC, CVA and hx of aneurysm repair, HTN, HLD, mitral valve prolapse, PAD, RA, HFPEF p/w urosepsis    - HIDA with no evidence of acute rox.  s/p ERCP yesterday.  Now s/p multiple stones removal and extraction of occluded stent.  tolerated procedure well  - Last echo 2/2018 with grossly normal LV systolic function and Diastolic dysfunction grade 2.  No need to repeat echo at this time.  - ECG with no evidence of ischemia.  Pt appears euvolemic and optimized from a cardiac standpoint for low to moderate risk procedure with routine hemodynamic monitoring during the perioperative period recommended.    - Monitor and replete electrolytes as necessary. Keep K>4.0 and Mg>2.0.   - HTN better controlled, cont present meds  - h/p PAF, rate controlled.  Continue Cardizem and metoprolol at current doses.  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 Has not had any issues on these meds.  Patient follows up with dr. Falcon as outpatient  - Dose Coumadin. Goal INR 2-3. Needs Daily INRs.  Received 3 mg last night for INR of 1.46, today's = 1.30.  However, patient is for PICC line insertion for Abx administration.  So as of now, will not attempt to raise INR to therapeutic level until after PICC line insertion.  Monitor H/H and for clinical signs of bleeding.   - PAD: currently disease is severe and not amenable to revascularization.   - surgical eval noted, no plan for procedure at this time    - Further cardiac workup will depend on clinical course.   - All other workup per primary team.  - Will follow    Isabel Quintero NP  Cardiology 82 yo female w/ PMH of PAF on AC, CVA and hx of aneurysm repair, HTN, HLD, mitral valve prolapse, PAD, RA, HFPEF p/w urosepsis    - HIDA with no evidence of acute rox.  s/p ERCP yesterday.  Now s/p multiple stones removal and extraction of occluded stent.  tolerated procedure well  - Last echo 2/2018 with grossly normal LV systolic function and Diastolic dysfunction grade 2.  No need to repeat echo at this time.  - ECG with no evidence of ischemia.    - Monitor and replete electrolytes as necessary. Keep K>4.0 and Mg>2.0.   - HTN better controlled, cont present meds  - h/p PAF, rate controlled.  Continue Cardizem and metoprolol at current doses.  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 Has not had any issues on these meds.  Patient follows up with dr. Falcon as outpatient  - Dose Coumadin. Goal INR 2-3. Needs Daily INRs.  Received 3 mg last night for INR of 1.46, today's = 1.30.  However, patient is for PICC line insertion for Abx administration.  So as of now, will not attempt to raise INR to therapeutic level until after PICC line insertion.  Monitor H/H and for clinical signs of bleeding.   - PAD: currently disease is severe and not amenable to revascularization.   - surgical eval noted, no plan for procedure at this time    - Cr is rising. At risk for AKSHAT. Please monitor closely. Please continue to maintain strict I/Os, monitor daily weights, Cr. Avoid nephrotoxic agents.    - Further cardiac workup will depend on clinical course.   - All other workup per primary team.  - Will follow    Isabel Quintero NP  Cardiology

## 2018-07-27 LAB
ALBUMIN SERPL ELPH-MCNC: 2.5 G/DL — LOW (ref 3.3–5)
ALP SERPL-CCNC: 212 U/L — HIGH (ref 40–120)
ALT FLD-CCNC: 37 U/L — SIGNIFICANT CHANGE UP (ref 12–78)
ANION GAP SERPL CALC-SCNC: 8 MMOL/L — SIGNIFICANT CHANGE UP (ref 5–17)
AST SERPL-CCNC: 22 U/L — SIGNIFICANT CHANGE UP (ref 15–37)
BASOPHILS # BLD AUTO: 0.04 K/UL — SIGNIFICANT CHANGE UP (ref 0–0.2)
BASOPHILS NFR BLD AUTO: 0.3 % — SIGNIFICANT CHANGE UP (ref 0–2)
BILIRUB SERPL-MCNC: 0.3 MG/DL — SIGNIFICANT CHANGE UP (ref 0.2–1.2)
BUN SERPL-MCNC: 54 MG/DL — HIGH (ref 7–23)
CALCIUM SERPL-MCNC: 8.3 MG/DL — LOW (ref 8.5–10.1)
CHLORIDE SERPL-SCNC: 110 MMOL/L — HIGH (ref 96–108)
CO2 SERPL-SCNC: 26 MMOL/L — SIGNIFICANT CHANGE UP (ref 22–31)
CREAT SERPL-MCNC: 1.6 MG/DL — HIGH (ref 0.5–1.3)
CULTURE RESULTS: SIGNIFICANT CHANGE UP
EOSINOPHIL # BLD AUTO: 0.12 K/UL — SIGNIFICANT CHANGE UP (ref 0–0.5)
EOSINOPHIL NFR BLD AUTO: 1 % — SIGNIFICANT CHANGE UP (ref 0–6)
GLUCOSE SERPL-MCNC: 87 MG/DL — SIGNIFICANT CHANGE UP (ref 70–99)
HCT VFR BLD CALC: 27.5 % — LOW (ref 34.5–45)
HCT VFR BLD CALC: 29.6 % — LOW (ref 34.5–45)
HGB BLD-MCNC: 8.5 G/DL — LOW (ref 11.5–15.5)
HGB BLD-MCNC: 9.2 G/DL — LOW (ref 11.5–15.5)
IMM GRANULOCYTES NFR BLD AUTO: 0.5 % — SIGNIFICANT CHANGE UP (ref 0–1.5)
INR BLD: 1.6 RATIO — HIGH (ref 0.88–1.16)
LYMPHOCYTES # BLD AUTO: 1.51 K/UL — SIGNIFICANT CHANGE UP (ref 1–3.3)
LYMPHOCYTES # BLD AUTO: 12.2 % — LOW (ref 13–44)
MCHC RBC-ENTMCNC: 26.1 PG — LOW (ref 27–34)
MCHC RBC-ENTMCNC: 30.9 GM/DL — LOW (ref 32–36)
MCV RBC AUTO: 84.4 FL — SIGNIFICANT CHANGE UP (ref 80–100)
MONOCYTES # BLD AUTO: 0.65 K/UL — SIGNIFICANT CHANGE UP (ref 0–0.9)
MONOCYTES NFR BLD AUTO: 5.2 % — SIGNIFICANT CHANGE UP (ref 2–14)
NEUTROPHILS # BLD AUTO: 10.02 K/UL — HIGH (ref 1.8–7.4)
NEUTROPHILS NFR BLD AUTO: 80.8 % — HIGH (ref 43–77)
PLATELET # BLD AUTO: 273 K/UL — SIGNIFICANT CHANGE UP (ref 150–400)
POTASSIUM SERPL-MCNC: 4.2 MMOL/L — SIGNIFICANT CHANGE UP (ref 3.5–5.3)
POTASSIUM SERPL-SCNC: 4.2 MMOL/L — SIGNIFICANT CHANGE UP (ref 3.5–5.3)
PROT SERPL-MCNC: 5.9 G/DL — LOW (ref 6–8.3)
PROTHROM AB SERPL-ACNC: 17.6 SEC — HIGH (ref 9.8–12.7)
RBC # BLD: 3.26 M/UL — LOW (ref 3.8–5.2)
RBC # FLD: 17.7 % — HIGH (ref 10.3–14.5)
SODIUM SERPL-SCNC: 144 MMOL/L — SIGNIFICANT CHANGE UP (ref 135–145)
SPECIMEN SOURCE: SIGNIFICANT CHANGE UP
WBC # BLD: 12.4 K/UL — HIGH (ref 3.8–10.5)
WBC # FLD AUTO: 12.4 K/UL — HIGH (ref 3.8–10.5)

## 2018-07-27 PROCEDURE — 99232 SBSQ HOSP IP/OBS MODERATE 35: CPT

## 2018-07-27 PROCEDURE — 99233 SBSQ HOSP IP/OBS HIGH 50: CPT | Mod: GC

## 2018-07-27 RX ORDER — WARFARIN SODIUM 2.5 MG/1
3 TABLET ORAL ONCE
Qty: 0 | Refills: 0 | Status: COMPLETED | OUTPATIENT
Start: 2018-07-27 | End: 2018-07-27

## 2018-07-27 RX ADMIN — Medication 25 MILLIGRAM(S): at 11:23

## 2018-07-27 RX ADMIN — WARFARIN SODIUM 3 MILLIGRAM(S): 2.5 TABLET ORAL at 21:48

## 2018-07-27 RX ADMIN — Medication 50 MILLIGRAM(S): at 14:31

## 2018-07-27 RX ADMIN — Medication 50 MILLIGRAM(S): at 05:37

## 2018-07-27 RX ADMIN — GABAPENTIN 100 MILLIGRAM(S): 400 CAPSULE ORAL at 21:43

## 2018-07-27 RX ADMIN — Medication 100 MILLIGRAM(S): at 14:31

## 2018-07-27 RX ADMIN — Medication 50 MILLIGRAM(S): at 21:42

## 2018-07-27 RX ADMIN — ERTAPENEM SODIUM 100 MILLIGRAM(S): 1 INJECTION, POWDER, LYOPHILIZED, FOR SOLUTION INTRAMUSCULAR; INTRAVENOUS at 14:31

## 2018-07-27 RX ADMIN — GABAPENTIN 100 MILLIGRAM(S): 400 CAPSULE ORAL at 14:31

## 2018-07-27 RX ADMIN — Medication 25 MILLIGRAM(S): at 23:02

## 2018-07-27 RX ADMIN — PANTOPRAZOLE SODIUM 40 MILLIGRAM(S): 20 TABLET, DELAYED RELEASE ORAL at 05:37

## 2018-07-27 RX ADMIN — POLYETHYLENE GLYCOL 3350 17 GRAM(S): 17 POWDER, FOR SOLUTION ORAL at 17:27

## 2018-07-27 RX ADMIN — Medication 81 MILLIGRAM(S): at 11:23

## 2018-07-27 RX ADMIN — ENOXAPARIN SODIUM 50 MILLIGRAM(S): 100 INJECTION SUBCUTANEOUS at 11:24

## 2018-07-27 RX ADMIN — Medication 100 MILLIGRAM(S): at 21:43

## 2018-07-27 RX ADMIN — Medication 25 MILLIGRAM(S): at 05:37

## 2018-07-27 RX ADMIN — SENNA PLUS 2 TABLET(S): 8.6 TABLET ORAL at 21:42

## 2018-07-27 RX ADMIN — ATORVASTATIN CALCIUM 40 MILLIGRAM(S): 80 TABLET, FILM COATED ORAL at 21:43

## 2018-07-27 RX ADMIN — GABAPENTIN 100 MILLIGRAM(S): 400 CAPSULE ORAL at 05:37

## 2018-07-27 RX ADMIN — Medication 100 MILLIGRAM(S): at 05:37

## 2018-07-27 RX ADMIN — MIRTAZAPINE 7.5 MILLIGRAM(S): 45 TABLET, ORALLY DISINTEGRATING ORAL at 21:42

## 2018-07-27 NOTE — PROGRESS NOTE ADULT - ASSESSMENT
84 yo female w/ PMH of afib on coumadin, CVA and hx of aneurysm, HTN, HLD, mitral valve prolapse, PVD s/p stents x 3 (RLE), rheumatoid arthritis, who presents with one week of fever, diaphoresis,  chills, weakness, and lethargy found to have  GNR bacteremia and ecoli/klebsiella in urine. 82 yo female w/ PMH of afib on coumadin, CVA and hx of aneurysm, HTN, HLD, mitral valve prolapse, PVD s/p stents x 3 (RLE), rheumatoid arthritis, who presents prior to admission with one week of fever, diaphoresis,  chills, weakness, and lethargy admitted with GNR bacteremia and ecoli/klebsiella complicated UTI; 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 82 yo female w/ PMH of afib on coumadin, CVA and hx of aneurysm, HTN, HLD, mitral valve prolapse, PVD s/p stents x 3 (RLE), rheumatoid arthritis, who presents prior to admission with one week of fever, diaphoresis,  chills, weakness, and lethargy admitted with sepsis sec GNR bacteremia and ecoli/klebsiella complicated UTI; 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

## 2018-07-27 NOTE — CHART NOTE - NSCHARTNOTEFT_GEN_A_CORE
Assessment: As per chart 84 yo female w/ PMH of afib on coumadin, CVA and hx of aneurysm repair, HTN, HLD, mitral valve prolapse, PVD s/p stents x 3 (RLE), rheumatoid arthritis, who presents with one week of subjective fever, diaphoresis chills, weakness, and lethargy. Pt found to have UTI with sepsis, e coli septicemia secondary to UTI. Pt is s/p ERCP POD 2, in which occluded stent was removed and multiple stones were extracted. Pt is also s/p PICC line (7/26). Pt reports continued good appetite and PO intake, consuming 100% of meals in house. Pt with constipation, last BM 7/24, reviewed with pt foods to assist with constipation. Pt with no other GI distress at this time.     Factors impacting intake: [x ] none     Diet Presciption: Diet, DASH/TLC:   Sodium & Cholesterol Restricted (07-22-18 @ 11:10)    Intake: good     Current Weight: (7/25) 116.4lbs  Previous Weight: (7/21, admission wt) 115.15  1.1% Weight Change since admission     Pertinent Medications: MEDICATIONS  (STANDING):  aspirin enteric coated 81 milliGRAM(s) Oral daily  atorvastatin 40 milliGRAM(s) Oral at bedtime  diltiazem    Tablet 30 milliGRAM(s) Oral every 6 hours  docusate sodium 100 milliGRAM(s) Oral three times a day  enoxaparin Injectable 50 milliGRAM(s) SubCutaneous daily  ertapenem  IVPB      ertapenem  IVPB 500 milliGRAM(s) IV Intermittent every 24 hours  gabapentin 100 milliGRAM(s) Oral three times a day  glycerin Suppository - Adult 1 Suppository(s) Rectal daily  hydrALAZINE 50 milliGRAM(s) Oral three times a day  metoprolol tartrate 25 milliGRAM(s) Oral every 6 hours  mirtazapine 7.5 milliGRAM(s) Oral at bedtime  pantoprazole    Tablet 40 milliGRAM(s) Oral before breakfast  polyethylene glycol 3350 17 Gram(s) Oral two times a day  senna 2 Tablet(s) Oral at bedtime    MEDICATIONS  (PRN):  acetaminophen   Tablet 650 milliGRAM(s) Oral every 8 hours PRN Mild Pain (1 - 3)    Pertinent Labs: 07-27 Na144 mmol/L Glu 87 mg/dL K+ 4.2 mmol/L Cr  1.60 mg/dL<H> BUN 54 mg/dL<H> 07-27 Alb 2.5 g/dL<L>     CAPILLARY BLOOD GLUCOSE    Skin: no edema or pressure injuries noted at this time    Estimated Needs:   [x ] no change since previous assessment  [ ] recalculated:     Previous Nutrition Diagnosis:   [x ] No active nutrition diagnosis identified at this time.    Nutrition Diagnosis is [ ] ongoing  [ ] resolved [x ] not applicable     New Nutrition Diagnosis: [ ] not applicable   Nutrition related knowledge deficit related to lack of exposure to nutrition education as evidenced by pt not following nutrition guidelines PTA.     Interventions:   Recommend  [ ] Change Diet To:  [ ] Nutrition Supplement  [ ] Nutrition Support  [x ] Other:   1) Continue with current DASH/TLC diet  2) Reviewed with pt consuming foods high in fiber to assist with constipation  3) Pt provided with written and oral education on Coumadin  4) Reviewed with pt foods high in Vitamin K and should be limited, and consuming a consistent amount of Vitamin K  5) Pt briefly educated on low sodium, heart healthy diet      Monitoring and Evaluation:   [ ] PO intake [ x ] Tolerance to diet prescription [ x ] weights [ x ] labs[ x ] follow up per protocol  [ ] other: Assessment: As per chart 82 yo female w/ PMH of afib on coumadin, CVA and hx of aneurysm repair, HTN, HLD, mitral valve prolapse, PVD s/p stents x 3 (RLE), rheumatoid arthritis, who presents with one week of subjective fever, diaphoresis chills, weakness, and lethargy. Pt found to have UTI with sepsis, e coli septicemia secondary to UTI. Pt is s/p ERCP POD 2, in which occluded stent was removed and multiple stones were extracted. Pt is also s/p PICC line (7/26). Pt reports continued good appetite and PO intake, consuming 100% of meals in house. Pt with constipation, last BM 7/24, reviewed with pt foods to assist with constipation. Pt with no other GI distress at this time.     Factors impacting intake: [x ] none     Diet Presciption: Diet, DASH/TLC:   Sodium & Cholesterol Restricted (07-22-18 @ 11:10)    Intake: good     Current Weight: (7/25) 116.4lbs  Previous Weight: (7/21, admission wt) 115.15  1.1% Weight Change since admission     Pertinent Medications: MEDICATIONS  (STANDING):  aspirin enteric coated 81 milliGRAM(s) Oral daily  atorvastatin 40 milliGRAM(s) Oral at bedtime  diltiazem    Tablet 30 milliGRAM(s) Oral every 6 hours  docusate sodium 100 milliGRAM(s) Oral three times a day  enoxaparin Injectable 50 milliGRAM(s) SubCutaneous daily  ertapenem  IVPB      ertapenem  IVPB 500 milliGRAM(s) IV Intermittent every 24 hours  gabapentin 100 milliGRAM(s) Oral three times a day  glycerin Suppository - Adult 1 Suppository(s) Rectal daily  hydrALAZINE 50 milliGRAM(s) Oral three times a day  metoprolol tartrate 25 milliGRAM(s) Oral every 6 hours  mirtazapine 7.5 milliGRAM(s) Oral at bedtime  pantoprazole    Tablet 40 milliGRAM(s) Oral before breakfast  polyethylene glycol 3350 17 Gram(s) Oral two times a day  senna 2 Tablet(s) Oral at bedtime    MEDICATIONS  (PRN):  acetaminophen   Tablet 650 milliGRAM(s) Oral every 8 hours PRN Mild Pain (1 - 3)    Pertinent Labs: 07-27 Na144 mmol/L Glu 87 mg/dL K+ 4.2 mmol/L Cr  1.60 mg/dL<H> BUN 54 mg/dL<H> 07-27 Alb 2.5 g/dL<L>     CAPILLARY BLOOD GLUCOSE    Skin: no edema or pressure injuries noted at this time    Estimated Needs:   [x ] no change since previous assessment  [ ] recalculated:     Previous Nutrition Diagnosis:   [x ] No active nutrition diagnosis identified at this time.    Nutrition Diagnosis is [ ] ongoing  [ ] resolved [x ] not applicable     New Nutrition Diagnosis: [ ] not applicable   Nutrition related knowledge deficit related to lack of exposure to nutrition education as evidenced by pt not following nutrition guidelines PTA.     Interventions:   Recommend  [ ] Change Diet To:  [ ] Nutrition Supplement  [ ] Nutrition Support  [x ] Other:   1) Continue with current DASH/TLC diet  2) Reviewed with pt consuming foods high in fiber to assist with constipation  3) Pt provided with written and oral education on Coumadin  4) Reviewed with pt foods high in Vitamin K and should be limited, and consuming a consistent amount of Vitamin K  5) Pt briefly educated on low sodium, heart healthy diet  6) RD to remain available       Monitoring and Evaluation:   [x ] PO intake [ x ] Tolerance to diet prescription [ x ] weights [ x ] labs[ x ] follow up per protocol  [ ] other:

## 2018-07-27 NOTE — PROGRESS NOTE ADULT - PROBLEM SELECTOR PLAN 3
anemia likely multifactorial  etiology of fluctuating hgb levels unclear  no overt s/s gib  repeat h/h later this afternoon  f/u fobt  serial cbcs, transfuse prn  cont ppi anemia likely multifactorial  etiology of fluctuating hgb levels unclear  no overt s/s gib  no active bleeding seen on recent scope  repeat h/h later this afternoon  f/u fobt  serial cbcs, transfuse prn  cont ppi

## 2018-07-27 NOTE — PROGRESS NOTE ADULT - PROBLEM SELECTOR PLAN 1
ESBL bacteremia  on invanz  afebrile , no leukocytosis.   repeat blood culture ngtd   ID Dr. armendariz following. will f/u recs for abx course. continue invanz x 10d, f/u repeat blood cx, PICC today, will likely need rehab for iv antibx as pt unable to complete antibx at home apprec sw/cm recs ESBL bacteremia  on invanz for 5 more days, ends tues 7/31 then can dc home; check lfts  afebrile , no leukocytosis.   repeat blood culture ngtd   ID Dr. armendariz following. will f/u recs for abx course. continue invanz x 10d, f/u repeat blood cx, PICC today, will likely need rehab for iv antibx as pt unable to complete antibx at home apprec sw/cm recs

## 2018-07-27 NOTE — PROGRESS NOTE ADULT - SUBJECTIVE AND OBJECTIVE BOX
pt seen  feeling well   no complaints  ICU Vital Signs Last 24 Hrs  T(C): 36.3 (27 Jul 2018 05:34), Max: 36.5 (26 Jul 2018 20:32)  T(F): 97.4 (27 Jul 2018 05:34), Max: 97.7 (26 Jul 2018 20:32)  HR: 65 (27 Jul 2018 05:34) (65 - 72)  BP: 138/77 (27 Jul 2018 05:34) (116/71 - 138/77)  BP(mean): --  ABP: --  ABP(mean): --  RR: 17 (27 Jul 2018 05:34) (17 - 17)  SpO2: 93% (27 Jul 2018 05:34) (93% - 96%)  gen-NAD  resp-clear b/l  abd-soft NT/ND                          8.5    12.40 )-----------( 273      ( 27 Jul 2018 07:02 )             27.5   07-27    144  |  110<H>  |  54<H>  ----------------------------<  87  4.2   |  26  |  1.60<H>    Ca    8.3<L>      27 Jul 2018 07:02    TPro  5.9<L>  /  Alb  2.5<L>  /  TBili  0.3  /  DBili  .10  /  AST  22  /  ALT  37  /  AlkPhos  212<H>  07-27

## 2018-07-27 NOTE — PROGRESS NOTE ADULT - PROBLEM SELECTOR PLAN 1
pt w hx of ercp w stent placement and biliary sepsis  US showed choledocholithiasis and cholelithiasis  sp ercp 7/25- occluded stent removed, multiple stones extracted  am labs pending  surgery eval appreciated, f/u recs, pt/family refusing lap rox at present  cont abx  repeat bld cxs ngtd  f/u ID recs  trend labs

## 2018-07-27 NOTE — PROGRESS NOTE ADULT - SUBJECTIVE AND OBJECTIVE BOX
CARITO CONSTANTINO is a 83yFemale , patient examined and chart reviewed.     INTERVAL HPI/ OVERNIGHT EVENTS:   No events.    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  Osteoarthritis  Afib  PVD (peripheral vascular disease)  Neuropathy: (Right lower leg)  H/O cerebral aneurysm repair: brain clips  CVA (cerebral vascular accident): (&quot;Mini-stroke&quot;,1990&#x27;s)  Rheumatoid arthritis  Hyperlipidemia  Hypertension  S/P cataract surgery: (Left eye)  Elective surgery: (&quot;Twisted bowel&quot;, 2014)  Elective surgery: (Exision of cyst on liver, 1985)  S/P ORIF (open reduction internal fixation) fracture: Left hip fx (2012) &amp; R hip fx (2013)  H/O cerebral aneurysm repair: Brain clips (1978(  Renal stone: Cysto stent placement 10/1/2014  PVD (peripheral vascular disease): s/p RLE bypass x 3, most recent 3/2012 Right external iliac to PT bypass w/ PTFE (2012)  S/P FRED (total abdominal hysterectomy): (1987, Hx of &quot;ovarian cancer?&quot;)    For details regarding the patient's social history, family history, and other miscellaneous elements, please refer the initial infectious diseases consultation and/or the admitting history and physical examination for this admission.    ROS:  CONSTITUTIONAL:  Negative fever or chills  EYES:  Negative  blurry vision or double vision  CARDIOVASCULAR:  Negative for chest pain or palpitations  RESPIRATORY:  Negative for cough, wheezing, or SOB   GASTROINTESTINAL:  Negative for nausea, vomiting, diarrhea, constipation, or abdominal pain  GENITOURINARY:  Negative frequency, urgency or dysuria  NEUROLOGIC:  No headache, confusion, dizziness, lightheadedness  All other systems were reviewed and are negative       Current inpatient medications :    ANTIBIOTICS/RELEVANT:  ertapenem  IVPB      ertapenem  IVPB 500 milliGRAM(s) IV Intermittent every 24 hours      acetaminophen   Tablet 650 milliGRAM(s) Oral every 8 hours PRN  aspirin enteric coated 81 milliGRAM(s) Oral daily  atorvastatin 40 milliGRAM(s) Oral at bedtime  diltiazem    Tablet 30 milliGRAM(s) Oral every 6 hours  docusate sodium 100 milliGRAM(s) Oral three times a day  enoxaparin Injectable 50 milliGRAM(s) SubCutaneous daily  gabapentin 100 milliGRAM(s) Oral three times a day  glycerin Suppository - Adult 1 Suppository(s) Rectal daily  hydrALAZINE 50 milliGRAM(s) Oral three times a day  metoprolol tartrate 25 milliGRAM(s) Oral every 6 hours  mirtazapine 7.5 milliGRAM(s) Oral at bedtime  pantoprazole    Tablet 40 milliGRAM(s) Oral before breakfast  polyethylene glycol 3350 17 Gram(s) Oral two times a day  senna 2 Tablet(s) Oral at bedtime      Objective:    07-26 @ 07:01  -  07-27 @ 07:00  --------------------------------------------------------  IN: 0 mL / OUT: 100 mL / NET: -100 mL      T(C): 36.3 (07-27-18 @ 05:34), Max: 36.5 (07-26-18 @ 20:32)  HR: 65 (07-27-18 @ 05:34) (65 - 72)  BP: 138/77 (07-27-18 @ 05:34) (116/71 - 138/77)  RR: 17 (07-27-18 @ 05:34) (17 - 17)  SpO2: 93% (07-27-18 @ 05:34) (93% - 96%)  Wt(kg): --      Physical Exam:  General: no acute distress  Eyes: sclera anicteric, pupils equal and reactive to light  ENMT: buccal mucosa moist, pharynx not injected  Neck: supple, trachea midline  Lungs: clear, no wheeze/rhonchi  Cardiovascular: regular rate and rhythm, S1 S2  Abdomen: soft, nontender, no organomegaly present, bowel sounds normal  Neurological: alert and oriented x3, Cranial Nerves II-XII grossly intact  Skin: no increased ecchymosis/petechiae/purpura  Lymph Nodes: no palpable cervical/supraclavicular lymph nodes enlargements  Extremities: no cyanosis/clubbing/edema      LABS:                          8.5    12.40 )-----------( 273      ( 27 Jul 2018 07:02 )             27.5       07-27    144  |  110<H>  |  54<H>  ----------------------------<  87  4.2   |  26  |  1.60<H>    Ca    8.3<L>      27 Jul 2018 07:02    TPro  5.9<L>  /  Alb  2.5<L>  /  TBili  0.3  /  DBili  .10  /  AST  22  /  ALT  37  /  AlkPhos  212<H>  07-27      PT/INR - ( 27 Jul 2018 07:02 )   PT: 17.6 sec;   INR: 1.60 ratio        Assessment :  84 yo female w/ PMH of afib on coumadin, CVA and hx of aneurysm repair, HTN, HLD, mitral   valve prolapse, PVD s/p stents x 3 (RLE), 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  Appropriate use of antibiotics and adverse effects reviewed.      I have discussed the above plan of care with patient/ family in detail. They expressed understanding of the  treatment plan . Risks, benefits and alternatives discussed in detail. I have asked if they have any questions or concerns and appropriately addressed them to the best of my ability .    > 35 minutes were spent in direct patient care reviewing notes, medications ,labs data/ imaging , discussion with multidisciplinary team.    Thank you for allowing me to participate in care of your patient .    Cynthia Jaimes MD  Infectious Disease  [phone]

## 2018-07-27 NOTE — PROGRESS NOTE ADULT - ASSESSMENT
82 yo female w/ PMH of PAF on AC, CVA and hx of aneurysm repair, HTN, HLD, mitral valve prolapse, PAD, RA, HFPEF p/w urosepsis    - HIDA with no evidence of acute rox.  s/p ERCP.  s/p multiple stones removal and extraction of occluded stent.  tolerated procedure well with no cardiac complications  - Last echo 2/2018 with grossly normal LV systolic function and Diastolic dysfunction grade 2.  No need to repeat echo at this time.  - ECG with no evidence of ischemia.    - Monitor and replete electrolytes as necessary. Keep K>4.0 and Mg>2.0.   - HTN better controlled, cont present meds  - h/p PAF, rate controlled.  Continue Cardizem and metoprolol at current doses.  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 Has not had any issues on these meds.  Patient follows up with dr. Laughlin as outpatient  - Dose Coumadin. Goal INR 2-3. Needs Daily INRs.   s/p PICC, so can increase oral dose of Coumadin at night.  Monitor H/H and for clinical signs of bleeding.   - PAD: currently disease is severe and not amenable to revascularization.   - Cr is rising. At risk for AKSHAT. Please monitor closely. Please continue to maintain strict I/Os, monitor daily weights, Cr. Avoid nephrotoxic agents.    - Further cardiac workup will depend on clinical course.   - All other workup per primary team.  - Will follow

## 2018-07-27 NOTE — PROGRESS NOTE ADULT - SUBJECTIVE AND OBJECTIVE BOX
[facility] - Dahiana Bañuelos, Toney Horner, Terrie, Filiberto Laughlin  Office Number:  [phone]    Patient resting comfortably in bed in NAD.  Laying flat with no respiratory distress.  No complaints of chest pain, dyspnea, palpitations, PND, or orthopnea.    F/U for:  Atrial fibrillation, HTN, hyperlipidemia    MEDICATIONS  (STANDING):  aspirin enteric coated 81 milliGRAM(s) Oral daily  atorvastatin 40 milliGRAM(s) Oral at bedtime  diltiazem    Tablet 30 milliGRAM(s) Oral every 6 hours  docusate sodium 100 milliGRAM(s) Oral three times a day  enoxaparin Injectable 50 milliGRAM(s) SubCutaneous daily  ertapenem  IVPB      ertapenem  IVPB 500 milliGRAM(s) IV Intermittent every 24 hours  gabapentin 100 milliGRAM(s) Oral three times a day  glycerin Suppository - Adult 1 Suppository(s) Rectal daily  hydrALAZINE 50 milliGRAM(s) Oral three times a day  metoprolol tartrate 25 milliGRAM(s) Oral every 6 hours  mirtazapine 7.5 milliGRAM(s) Oral at bedtime  pantoprazole    Tablet 40 milliGRAM(s) Oral before breakfast  polyethylene glycol 3350 17 Gram(s) Oral two times a day  senna 2 Tablet(s) Oral at bedtime    MEDICATIONS  (PRN):  acetaminophen   Tablet 650 milliGRAM(s) Oral every 8 hours PRN Mild Pain (1 - 3)      Allergies    No Known Allergies    Intolerances        Vital Signs Last 24 Hrs  T(C): 36.3 (27 Jul 2018 05:34), Max: 36.5 (26 Jul 2018 20:32)  T(F): 97.4 (27 Jul 2018 05:34), Max: 97.7 (26 Jul 2018 20:32)  HR: 65 (27 Jul 2018 05:34) (65 - 72)  BP: 138/77 (27 Jul 2018 05:34) (116/71 - 138/77)  BP(mean): --  RR: 17 (27 Jul 2018 05:34) (17 - 17)  SpO2: 93% (27 Jul 2018 05:34) (93% - 96%)    I&O's Summary    26 Jul 2018 07:01  -  27 Jul 2018 07:00  --------------------------------------------------------  IN: 0 mL / OUT: 100 mL / NET: -100 mL        ON EXAM:    General: NAD, awake and alert, oriented x 3  HEENT: Mucous membranes are moist, anicteric  Lungs: Non-labored, breath sounds are clear bilaterally, No wheezing, rales or rhonchi  Cardiovascular: Regular, S1 and S2, no murmurs, rubs, or gallops  Gastrointestinal: Bowel Sounds present, soft, nontender.   Lymph: No peripheral edema. No lymphadenopathy.  Skin: No rashes or ulcers  Psych:  Mood & affect appropriate    LABS: All Labs Reviewed:                        8.5    12.40 )-----------( 273      ( 27 Jul 2018 07:02 )             27.5                         9.5    13.74 )-----------( 340      ( 26 Jul 2018 07:17 )             30.8                         8.7    6.62  )-----------( 287      ( 25 Jul 2018 07:11 )             28.0     27 Jul 2018 07:02    144    |  110    |  54     ----------------------------<  87     4.2     |  26     |  1.60   26 Jul 2018 07:17    145    |  109    |  45     ----------------------------<  145    4.8     |  26     |  1.50   25 Jul 2018 07:11    146    |  113    |  37     ----------------------------<  99     4.2     |  26     |  1.30     Ca    8.3        27 Jul 2018 07:02  Ca    8.6        26 Jul 2018 07:17  Ca    8.2        25 Jul 2018 07:11    TPro  5.9    /  Alb  2.5    /  TBili  0.3    /  DBili  .10    /  AST  22     /  ALT  37     /  AlkPhos  212    27 Jul 2018 07:02  TPro  6.4    /  Alb  2.6    /  TBili  0.3    /  DBili  x      /  AST  23     /  ALT  45     /  AlkPhos  265    26 Jul 2018 07:17  TPro  5.9    /  Alb  2.5    /  TBili  0.3    /  DBili  x      /  AST  18     /  ALT  40     /  AlkPhos  241    25 Jul 2018 07:11    PT/INR - ( 27 Jul 2018 07:02 )   PT: 17.6 sec;   INR: 1.60 ratio               Blood Culture: Organism --  Gram Stain Blood -- Gram Stain --  Specimen Source .Blood Blood-Venous  Culture-Blood --

## 2018-07-27 NOTE — PROGRESS NOTE ADULT - ASSESSMENT
84 yo with hx of CBD stones on multiple occasions.  Refusing lap rox at this time      cont abx      signing off, please call if needed      pt and daughter aware of risks of refusing surgery including, recurrent cbd stones, cholangitis, sepsis

## 2018-07-27 NOTE — PROGRESS NOTE ADULT - SUBJECTIVE AND OBJECTIVE BOX
Patient is a 83y old  Female who presents with a chief complaint of fever, x 1 week, weakness (23 Jul 2018 10:18)      INTERVAL HPI:  OVERNIGHT EVENTS:  T(F): 97.5 (07-27-18 @ 21:20), Max: 97.5 (07-27-18 @ 21:20)  HR: 67 (07-27-18 @ 21:20) (61 - 71)  BP: 119/66 (07-27-18 @ 21:20) (107/68 - 138/77)  RR: 16 (07-27-18 @ 21:20) (16 - 17)  SpO2: 98% (07-27-18 @ 21:20) (93% - 98%)  I&O's Summary    26 Jul 2018 07:01  -  27 Jul 2018 07:00  --------------------------------------------------------  IN: 0 mL / OUT: 100 mL / NET: -100 mL        REVIEW OF SYSTEMS:  CONSTITUTIONAL: No fever, weight loss, or fatigue  EYES: No eye pain, visual disturbances, or discharge  ENMT:  No difficulty hearing, tinnitus, vertigo; No sinus or throat pain  NECK: No pain or stiffness  BREASTS: No pain, masses, or nipple discharge  RESPIRATORY: No cough, wheezing, chills or hemoptysis; No shortness of breath  CARDIOVASCULAR: No chest pain, palpitations, dizziness, or leg swelling  GASTROINTESTINAL: No abdominal or epigastric pain. No nausea, vomiting, or hematemesis; No diarrhea or constipation. No melena or hematochezia.  GENITOURINARY: No dysuria, frequency, hematuria, or incontinence  NEUROLOGICAL: No headaches, memory loss, loss of strength, numbness, or tremors  SKIN: No itching, burning, rashes, or lesions   LYMPH NODES: No enlarged glands  ENDOCRINE: No heat or cold intolerance; No hair loss  MUSCULOSKELETAL: No joint pain or swelling; No muscle, back, or extremity pain  PSYCHIATRIC: No depression, anxiety, mood swings, or difficulty sleeping  HEME/LYMPH: No easy bruising, or bleeding gums  ALLERY AND IMMUNOLOGIC: No hives or eczema    PHYSICAL EXAM:  GENERAL: NAD, well-groomed, well-developed  HEAD:  Atraumatic, Normocephalic  EYES: EOMI, PERRLA, conjunctiva and sclera clear  ENMT: No tonsillar erythema, exudates, or enlargement; Moist mucous membranes, Good dentition, No lesions  NECK: Supple, No JVD, Normal thyroid  NERVOUS SYSTEM:  Alert & Oriented X3, Good concentration; Motor Strength 5/5 B/L upper and lower extremities; DTRs 2+ intact and symmetric  CHEST/LUNG: Clear to percussion bilaterally; No rales, rhonchi, wheezing, or rubs  HEART: Regular rate and rhythm; No murmurs, rubs, or gallops  ABDOMEN: Soft, Nontender, Nondistended; Bowel sounds present  EXTREMITIES:  2+ Peripheral Pulses, No clubbing, cyanosis, or edema  LYMPH: No lymphadenopathy noted  SKIN: No rashes or lesions    LABS:                        9.2    x     )-----------( x        ( 27 Jul 2018 16:03 )             29.6     07-27    144  |  110<H>  |  54<H>  ----------------------------<  87  4.2   |  26  |  1.60<H>    Ca    8.3<L>      27 Jul 2018 07:02    TPro  5.9<L>  /  Alb  2.5<L>  /  TBili  0.3  /  DBili  .10  /  AST  22  /  ALT  37  /  AlkPhos  212<H>  07-27    PT/INR - ( 27 Jul 2018 07:02 )   PT: 17.6 sec;   INR: 1.60 ratio             CAPILLARY BLOOD GLUCOSE                  MEDICATIONS  (STANDING):  aspirin enteric coated 81 milliGRAM(s) Oral daily  atorvastatin 40 milliGRAM(s) Oral at bedtime  diltiazem    Tablet 30 milliGRAM(s) Oral every 6 hours  docusate sodium 100 milliGRAM(s) Oral three times a day  enoxaparin Injectable 50 milliGRAM(s) SubCutaneous daily  ertapenem  IVPB      ertapenem  IVPB 500 milliGRAM(s) IV Intermittent every 24 hours  gabapentin 100 milliGRAM(s) Oral three times a day  glycerin Suppository - Adult 1 Suppository(s) Rectal daily  hydrALAZINE 50 milliGRAM(s) Oral three times a day  metoprolol tartrate 25 milliGRAM(s) Oral every 6 hours  mirtazapine 7.5 milliGRAM(s) Oral at bedtime  pantoprazole    Tablet 40 milliGRAM(s) Oral before breakfast  polyethylene glycol 3350 17 Gram(s) Oral two times a day  senna 2 Tablet(s) Oral at bedtime    MEDICATIONS  (PRN):  acetaminophen   Tablet 650 milliGRAM(s) Oral every 8 hours PRN Mild Pain (1 - 3) Patient is a 83y old  Female who presents with a chief complaint of fever, x 1 week, weakness (23 Jul 2018 10:18)      INTERVAL HPI: Pt seen and examined. States she is feeling well, aware she neeeds to stay until tuesday for iv antibx. Denies any other acute complaints at this time.     OVERNIGHT EVENTS: none noted  T(F): 97.5 (07-27-18 @ 21:20), Max: 97.5 (07-27-18 @ 21:20)  HR: 67 (07-27-18 @ 21:20) (61 - 71)  BP: 119/66 (07-27-18 @ 21:20) (107/68 - 138/77)  RR: 16 (07-27-18 @ 21:20) (16 - 17)  SpO2: 98% (07-27-18 @ 21:20) (93% - 98%)  I&O's Summary    26 Jul 2018 07:01  -  27 Jul 2018 07:00  --------------------------------------------------------  IN: 0 mL / OUT: 100 mL / NET: -100 mL        REVIEW OF SYSTEMS: 12 systems noncontributory except that stated in hpi    PHYSICAL EXAM:  GENERAL: NAD, elder  HEAD:  Atraumatic, Normocephalic  EYES: EOMI, PERRLA, conjunctiva and sclera clear  ENMT: No tonsillar erythema, exudates, or enlargement; Moist mucous membranes, Good dentition, No lesions  NECK: Supple, No JVD  NERVOUS SYSTEM:  Alert & Oriented X3, Good concentration; Motor Strength 4/5 B/L upper and lower extremities  CHEST/LUNG: Clear to percussion bilaterally; No rales, rhonchi, wheezing, or rubs  HEART: Regular rate and rhythm; No murmurs, rubs, or gallops  ABDOMEN: Soft, Nontender, Nondistended; Bowel sounds present  EXTREMITIES:  2+ Peripheral Pulses, No clubbing, cyanosis, or edema  SKIN: No rashes or lesions    LABS:                        9.2    x     )-----------( x        ( 27 Jul 2018 16:03 )             29.6     07-27    144  |  110<H>  |  54<H>  ----------------------------<  87  4.2   |  26  |  1.60<H>    Ca    8.3<L>      27 Jul 2018 07:02    TPro  5.9<L>  /  Alb  2.5<L>  /  TBili  0.3  /  DBili  .10  /  AST  22  /  ALT  37  /  AlkPhos  212<H>  07-27    PT/INR - ( 27 Jul 2018 07:02 )   PT: 17.6 sec;   INR: 1.60 ratio             CAPILLARY BLOOD GLUCOSE                  MEDICATIONS  (STANDING):  aspirin enteric coated 81 milliGRAM(s) Oral daily  atorvastatin 40 milliGRAM(s) Oral at bedtime  diltiazem    Tablet 30 milliGRAM(s) Oral every 6 hours  docusate sodium 100 milliGRAM(s) Oral three times a day  enoxaparin Injectable 50 milliGRAM(s) SubCutaneous daily  ertapenem  IVPB      ertapenem  IVPB 500 milliGRAM(s) IV Intermittent every 24 hours  gabapentin 100 milliGRAM(s) Oral three times a day  glycerin Suppository - Adult 1 Suppository(s) Rectal daily  hydrALAZINE 50 milliGRAM(s) Oral three times a day  metoprolol tartrate 25 milliGRAM(s) Oral every 6 hours  mirtazapine 7.5 milliGRAM(s) Oral at bedtime  pantoprazole    Tablet 40 milliGRAM(s) Oral before breakfast  polyethylene glycol 3350 17 Gram(s) Oral two times a day  senna 2 Tablet(s) Oral at bedtime    MEDICATIONS  (PRN):  acetaminophen   Tablet 650 milliGRAM(s) Oral every 8 hours PRN Mild Pain (1 - 3)

## 2018-07-27 NOTE — PROGRESS NOTE ADULT - SUBJECTIVE AND OBJECTIVE BOX
INTERVAL HPI/OVERNIGHT EVENTS:  pt seen and examined  pt denies n/v/d/c/abd pain, no bm +gas  per overnight rn no overt s/s gib, refusing most of bowel regimen  cbc noted addtl labs pending afebrile overnight  sp picc    MEDICATIONS  (STANDING):  aspirin enteric coated 81 milliGRAM(s) Oral daily  atorvastatin 40 milliGRAM(s) Oral at bedtime  diltiazem    Tablet 30 milliGRAM(s) Oral every 6 hours  docusate sodium 100 milliGRAM(s) Oral three times a day  enoxaparin Injectable 50 milliGRAM(s) SubCutaneous daily  ertapenem  IVPB      ertapenem  IVPB 500 milliGRAM(s) IV Intermittent every 24 hours  gabapentin 100 milliGRAM(s) Oral three times a day  glycerin Suppository - Adult 1 Suppository(s) Rectal daily  hydrALAZINE 50 milliGRAM(s) Oral three times a day  metoprolol tartrate 25 milliGRAM(s) Oral every 6 hours  mirtazapine 7.5 milliGRAM(s) Oral at bedtime  pantoprazole    Tablet 40 milliGRAM(s) Oral before breakfast  polyethylene glycol 3350 17 Gram(s) Oral two times a day  senna 2 Tablet(s) Oral at bedtime    MEDICATIONS  (PRN):  acetaminophen   Tablet 650 milliGRAM(s) Oral every 8 hours PRN Mild Pain (1 - 3)      Allergies    No Known Allergies    Intolerances        Review of Systems:    General:  No wt loss, fevers, chills, night sweats, fatigue   Eyes:  Good vision, no reported pain  ENT:  No sore throat, pain, runny nose, dysphagia  CV:  No pain, palpitations, hypo/hypertension  Resp:  No dyspnea, cough, tachypnea, wheezing  GI:  No pain, No nausea, No vomiting, No diarrhea, No constipation, No weight loss, No fever, No pruritis, No rectal bleeding, No melena, No dysphagia  :  No pain, bleeding, incontinence, nocturia  Muscle:  No pain, weakness  Neuro:  No weakness, tingling, memory problems  Psych:  No fatigue, insomnia, mood problems, depression  Endocrine:  No polyuria, polydypsia, cold/heat intolerance  Heme:  No petechiae, ecchymosis, easy bruisability  Skin:  No rash, tattoos, scars, edema      Vital Signs Last 24 Hrs  T(C): 36.3 (27 Jul 2018 05:34), Max: 36.5 (26 Jul 2018 20:32)  T(F): 97.4 (27 Jul 2018 05:34), Max: 97.7 (26 Jul 2018 20:32)  HR: 65 (27 Jul 2018 05:34) (65 - 72)  BP: 138/77 (27 Jul 2018 05:34) (116/71 - 138/77)  BP(mean): --  RR: 17 (27 Jul 2018 05:34) (17 - 17)  SpO2: 93% (27 Jul 2018 05:34) (93% - 96%)    PHYSICAL EXAM:    Constitutional: NAD, lying in bed  HEENT: ncat  Neck: No LAD  Respiratory: dec bs  Cardiovascular: S1 and S2, RRR  Gastrointestinal: BS+, soft,  nt to deep palpation, nd  Extremities: No peripheral edema  Vascular: 2+ peripheral pulses  Neurological: Awake alert responds appropriately  Skin: No rashes    LABS:                        8.5    12.40 )-----------( 273      ( 27 Jul 2018 07:02 )             27.5     07-26    145  |  109<H>  |  45<H>  ----------------------------<  145<H>  4.8   |  26  |  1.50<H>    Ca    8.6      26 Jul 2018 07:17    TPro  6.4  /  Alb  2.6<L>  /  TBili  0.3  /  DBili  x   /  AST  23  /  ALT  45  /  AlkPhos  265<H>  07-26    PT/INR - ( 26 Jul 2018 07:17 )   PT: 14.3 sec;   INR: 1.30 ratio               RADIOLOGY & ADDITIONAL TESTS:

## 2018-07-28 DIAGNOSIS — K59.00 CONSTIPATION, UNSPECIFIED: ICD-10-CM

## 2018-07-28 LAB
ANION GAP SERPL CALC-SCNC: 6 MMOL/L — SIGNIFICANT CHANGE UP (ref 5–17)
BUN SERPL-MCNC: 49 MG/DL — HIGH (ref 7–23)
CALCIUM SERPL-MCNC: 8.1 MG/DL — LOW (ref 8.5–10.1)
CHLORIDE SERPL-SCNC: 113 MMOL/L — HIGH (ref 96–108)
CO2 SERPL-SCNC: 27 MMOL/L — SIGNIFICANT CHANGE UP (ref 22–31)
CREAT SERPL-MCNC: 1.4 MG/DL — HIGH (ref 0.5–1.3)
GLUCOSE SERPL-MCNC: 93 MG/DL — SIGNIFICANT CHANGE UP (ref 70–99)
HCT VFR BLD CALC: 28.7 % — LOW (ref 34.5–45)
HGB BLD-MCNC: 8.5 G/DL — LOW (ref 11.5–15.5)
INR BLD: 1.82 RATIO — HIGH (ref 0.88–1.16)
MCHC RBC-ENTMCNC: 25.4 PG — LOW (ref 27–34)
MCHC RBC-ENTMCNC: 29.6 GM/DL — LOW (ref 32–36)
MCV RBC AUTO: 85.9 FL — SIGNIFICANT CHANGE UP (ref 80–100)
NRBC # BLD: 0 /100 WBCS — SIGNIFICANT CHANGE UP (ref 0–0)
OB PNL STL: NEGATIVE — SIGNIFICANT CHANGE UP
PLATELET # BLD AUTO: 268 K/UL — SIGNIFICANT CHANGE UP (ref 150–400)
POTASSIUM SERPL-MCNC: 4.3 MMOL/L — SIGNIFICANT CHANGE UP (ref 3.5–5.3)
POTASSIUM SERPL-SCNC: 4.3 MMOL/L — SIGNIFICANT CHANGE UP (ref 3.5–5.3)
PROTHROM AB SERPL-ACNC: 20.1 SEC — HIGH (ref 9.8–12.7)
RBC # BLD: 3.34 M/UL — LOW (ref 3.8–5.2)
RBC # FLD: 17.9 % — HIGH (ref 10.3–14.5)
SODIUM SERPL-SCNC: 146 MMOL/L — HIGH (ref 135–145)
WBC # BLD: 7.54 K/UL — SIGNIFICANT CHANGE UP (ref 3.8–10.5)
WBC # FLD AUTO: 7.54 K/UL — SIGNIFICANT CHANGE UP (ref 3.8–10.5)

## 2018-07-28 PROCEDURE — 74018 RADEX ABDOMEN 1 VIEW: CPT | Mod: 26

## 2018-07-28 PROCEDURE — 99232 SBSQ HOSP IP/OBS MODERATE 35: CPT

## 2018-07-28 RX ADMIN — Medication 81 MILLIGRAM(S): at 11:48

## 2018-07-28 RX ADMIN — POLYETHYLENE GLYCOL 3350 17 GRAM(S): 17 POWDER, FOR SOLUTION ORAL at 05:57

## 2018-07-28 RX ADMIN — Medication 50 MILLIGRAM(S): at 05:57

## 2018-07-28 RX ADMIN — Medication 50 MILLIGRAM(S): at 14:53

## 2018-07-28 RX ADMIN — Medication 100 MILLIGRAM(S): at 14:53

## 2018-07-28 RX ADMIN — Medication 25 MILLIGRAM(S): at 11:47

## 2018-07-28 RX ADMIN — Medication 100 MILLIGRAM(S): at 22:10

## 2018-07-28 RX ADMIN — ATORVASTATIN CALCIUM 40 MILLIGRAM(S): 80 TABLET, FILM COATED ORAL at 22:10

## 2018-07-28 RX ADMIN — POLYETHYLENE GLYCOL 3350 17 GRAM(S): 17 POWDER, FOR SOLUTION ORAL at 18:35

## 2018-07-28 RX ADMIN — MIRTAZAPINE 7.5 MILLIGRAM(S): 45 TABLET, ORALLY DISINTEGRATING ORAL at 22:10

## 2018-07-28 RX ADMIN — Medication 50 MILLIGRAM(S): at 22:10

## 2018-07-28 RX ADMIN — ENOXAPARIN SODIUM 50 MILLIGRAM(S): 100 INJECTION SUBCUTANEOUS at 11:47

## 2018-07-28 RX ADMIN — PANTOPRAZOLE SODIUM 40 MILLIGRAM(S): 20 TABLET, DELAYED RELEASE ORAL at 06:01

## 2018-07-28 RX ADMIN — GABAPENTIN 100 MILLIGRAM(S): 400 CAPSULE ORAL at 14:53

## 2018-07-28 RX ADMIN — GABAPENTIN 100 MILLIGRAM(S): 400 CAPSULE ORAL at 22:10

## 2018-07-28 RX ADMIN — ERTAPENEM SODIUM 100 MILLIGRAM(S): 1 INJECTION, POWDER, LYOPHILIZED, FOR SOLUTION INTRAMUSCULAR; INTRAVENOUS at 14:53

## 2018-07-28 RX ADMIN — SENNA PLUS 2 TABLET(S): 8.6 TABLET ORAL at 22:10

## 2018-07-28 RX ADMIN — Medication 25 MILLIGRAM(S): at 18:35

## 2018-07-28 RX ADMIN — Medication 100 MILLIGRAM(S): at 05:57

## 2018-07-28 RX ADMIN — Medication 25 MILLIGRAM(S): at 05:57

## 2018-07-28 RX ADMIN — GABAPENTIN 100 MILLIGRAM(S): 400 CAPSULE ORAL at 05:57

## 2018-07-28 NOTE — PROGRESS NOTE ADULT - SUBJECTIVE AND OBJECTIVE BOX
INTERVAL HPI/OVERNIGHT EVENTS:  pt seen and examined  denies n/v/abd pain passing gas no bm  labs noted afebrile overnight  per overnight rn no s/s overt gib    MEDICATIONS  (STANDING):  aspirin enteric coated 81 milliGRAM(s) Oral daily  atorvastatin 40 milliGRAM(s) Oral at bedtime  diltiazem    Tablet 30 milliGRAM(s) Oral every 6 hours  docusate sodium 100 milliGRAM(s) Oral three times a day  enoxaparin Injectable 50 milliGRAM(s) SubCutaneous daily  ertapenem  IVPB      ertapenem  IVPB 500 milliGRAM(s) IV Intermittent every 24 hours  gabapentin 100 milliGRAM(s) Oral three times a day  glycerin Suppository - Adult 1 Suppository(s) Rectal daily  hydrALAZINE 50 milliGRAM(s) Oral three times a day  metoprolol tartrate 25 milliGRAM(s) Oral every 6 hours  mirtazapine 7.5 milliGRAM(s) Oral at bedtime  pantoprazole    Tablet 40 milliGRAM(s) Oral before breakfast  polyethylene glycol 3350 17 Gram(s) Oral two times a day  senna 2 Tablet(s) Oral at bedtime    MEDICATIONS  (PRN):  acetaminophen   Tablet 650 milliGRAM(s) Oral every 8 hours PRN Mild Pain (1 - 3)      Allergies    No Known Allergies    Intolerances        Review of Systems:    General:  No wt loss, fevers, chills, night sweats, fatigue   Eyes:  Good vision, no reported pain  ENT:  No sore throat, pain, runny nose, dysphagia  CV:  No pain, palpitations, hypo/hypertension  Resp:  No dyspnea, cough, tachypnea, wheezing  GI:  No pain, No nausea, No vomiting, No diarrhea, +constipation, No weight loss, No fever, No pruritis, No rectal bleeding, No melena, No dysphagia  :  No pain, bleeding, incontinence, nocturia  Muscle:  No pain, weakness  Neuro:  No weakness, tingling, memory problems  Psych:  No fatigue, insomnia, mood problems, depression  Endocrine:  No polyuria, polydypsia, cold/heat intolerance  Heme:  No petechiae, ecchymosis, easy bruisability  Skin:  No rash, tattoos, scars, edema      Vital Signs Last 24 Hrs  T(C): 36.4 (28 Jul 2018 05:15), Max: 36.4 (27 Jul 2018 21:20)  T(F): 97.5 (28 Jul 2018 05:15), Max: 97.5 (27 Jul 2018 21:20)  HR: 70 (28 Jul 2018 05:15) (61 - 70)  BP: 139/85 (28 Jul 2018 05:15) (107/68 - 139/85)  BP(mean): --  RR: 16 (28 Jul 2018 05:15) (16 - 16)  SpO2: 96% (28 Jul 2018 05:15) (95% - 98%)    PHYSICAL EXAM:    Constitutional: NAD, lying in bed  HEENT: ncat  Neck: No LAD  Respiratory: dec bs  Cardiovascular: S1 and S2, RRR  Gastrointestinal: BS+, soft,  nt, nd  Extremities: No peripheral edema  Vascular: 2+ peripheral pulses  Neurological: Awake alert responds appropriately  Skin: No rashes    LABS:                        8.5    7.54  )-----------( 268      ( 28 Jul 2018 06:18 )             28.7     07-28    146<H>  |  113<H>  |  49<H>  ----------------------------<  93  4.3   |  27  |  1.40<H>    Ca    8.1<L>      28 Jul 2018 06:18    TPro  5.9<L>  /  Alb  2.5<L>  /  TBili  0.3  /  DBili  .10  /  AST  22  /  ALT  37  /  AlkPhos  212<H>  07-27    PT/INR - ( 28 Jul 2018 06:18 )   PT: 20.1 sec;   INR: 1.82 ratio               RADIOLOGY & ADDITIONAL TESTS:

## 2018-07-28 NOTE — PROGRESS NOTE ADULT - PROBLEM SELECTOR PLAN 3
leonardo done at bedside today- no overt blood appreciated, fobt sent and neg  anemia likely multifactorial  etiology of fluctuating hgb levels unclear  no overt s/s gib  no active bleeding seen on recent scope  serial cbcs, transfuse prn  cont ppi  consider iron studies/heme eval

## 2018-07-28 NOTE — PROGRESS NOTE ADULT - SUBJECTIVE AND OBJECTIVE BOX
[facility] -- Dahiana Bañuelos, Evens, Toney, Truman Falcon Savella  Office # [phone]      Follow Up:  Atrial fibrillation, HTN, hyperlipidemia    Subjective/Observations: Patient resting comfortably in chair in NAD.  Laying flat with no respiratory distress.  No complaints of chest pain, dyspnea, palpitations, PND, or orthopnea.  States she " feels better and cant wait to go home".       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  Osteoarthritis  Afib  PVD (peripheral vascular disease)  Neuropathy: (Right lower leg)  H/O cerebral aneurysm repair: brain clips  CVA (cerebral vascular accident): (&quot;Mini-stroke&quot;,1990&#x27;s)  Rheumatoid arthritis  Hyperlipidemia  Hypertension  S/P cataract surgery: (Left eye)  Elective surgery: (&quot;Twisted bowel&quot;, 2014)  Elective surgery: (Exision of cyst on liver, 1985)  S/P ORIF (open reduction internal fixation) fracture: Left hip fx (2012) &amp; R hip fx (2013)  H/O cerebral aneurysm repair: Brain clips (1978(  Renal stone: Cysto stent placement 10/1/2014  PVD (peripheral vascular disease): s/p RLE bypass x 3, most recent 3/2012 Right external iliac to PT bypass w/ PTFE (2012)  S/P FRED (total abdominal hysterectomy): (1987, Hx of &quot;ovarian cancer?&quot;)      MEDICATIONS  (STANDING):  aspirin enteric coated 81 milliGRAM(s) Oral daily  atorvastatin 40 milliGRAM(s) Oral at bedtime  diltiazem    Tablet 30 milliGRAM(s) Oral every 6 hours  docusate sodium 100 milliGRAM(s) Oral three times a day  enoxaparin Injectable 50 milliGRAM(s) SubCutaneous daily  ertapenem  IVPB      ertapenem  IVPB 500 milliGRAM(s) IV Intermittent every 24 hours  gabapentin 100 milliGRAM(s) Oral three times a day  glycerin Suppository - Adult 1 Suppository(s) Rectal daily  hydrALAZINE 50 milliGRAM(s) Oral three times a day  metoprolol tartrate 25 milliGRAM(s) Oral every 6 hours  mirtazapine 7.5 milliGRAM(s) Oral at bedtime  pantoprazole    Tablet 40 milliGRAM(s) Oral before breakfast  polyethylene glycol 3350 17 Gram(s) Oral two times a day  senna 2 Tablet(s) Oral at bedtime    MEDICATIONS  (PRN):  acetaminophen   Tablet 650 milliGRAM(s) Oral every 8 hours PRN Mild Pain (1 - 3)      Allergies    No Known Allergies    Intolerances            Vital Signs Last 24 Hrs  T(C): 36.4 (28 Jul 2018 05:15), Max: 36.4 (27 Jul 2018 21:20)  T(F): 97.5 (28 Jul 2018 05:15), Max: 97.5 (27 Jul 2018 21:20)  HR: 70 (28 Jul 2018 05:15) (61 - 70)  BP: 139/85 (28 Jul 2018 05:15) (107/68 - 139/85)  BP(mean): --  RR: 16 (28 Jul 2018 05:15) (16 - 16)  SpO2: 96% (28 Jul 2018 05:15) (95% - 98%)    I&O's Summary        PHYSICAL EXAM:  TELE: not on tele   Constitutional: NAD, awake and alert, well-developed  HEENT: Moist Mucous Membranes, Anicteric  Pulmonary: Non-labored, breath sounds are clear bilaterally, No wheezing, rales or rhonchi  Cardiovascular: IrRegular, S1 and S2, No murmurs, rubs, gallops or clicks  Gastrointestinal: Bowel Sounds present, soft, nontender.   Lymph: No peripheral edema. No lymphadenopathy. right arm PICC line   Skin: No visible rashes or ulcers.  Psych:  Mood & affect appropriate    LABS: All Labs Reviewed:                        8.5    7.54  )-----------( 268      ( 28 Jul 2018 06:18 )             28.7                         9.2    x     )-----------( x        ( 27 Jul 2018 16:03 )             29.6                         8.5    12.40 )-----------( 273      ( 27 Jul 2018 07:02 )             27.5     28 Jul 2018 06:18    146    |  113    |  49     ----------------------------<  93     4.3     |  27     |  1.40   27 Jul 2018 07:02    144    |  110    |  54     ----------------------------<  87     4.2     |  26     |  1.60   26 Jul 2018 07:17    145    |  109    |  45     ----------------------------<  145    4.8     |  26     |  1.50     Ca    8.1        28 Jul 2018 06:18  Ca    8.3        27 Jul 2018 07:02  Ca    8.6        26 Jul 2018 07:17    TPro  5.9    /  Alb  2.6    /  TBili  0.3    /  DBili  .10    /  AST  24     /  ALT  37     /  AlkPhos  213    28 Jul 2018 06:18  TPro  5.9    /  Alb  2.5    /  TBili  0.3    /  DBili  .10    /  AST  22     /  ALT  37     /  AlkPhos  212    27 Jul 2018 07:02  TPro  6.4    /  Alb  2.6    /  TBili  0.3    /  DBili  x      /  AST  23     /  ALT  45     /  AlkPhos  265    26 Jul 2018 07:17    PT/INR - ( 28 Jul 2018 06:18 )   PT: 20.1 sec;   INR: 1.82 ratio

## 2018-07-28 NOTE — PROGRESS NOTE ADULT - SUBJECTIVE AND OBJECTIVE BOX
CARITO CONSTANTINO is a 83yFemale , patient examined and chart reviewed.     INTERVAL HPI/ OVERNIGHT EVENTS:   Feels well. No events.    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  Osteoarthritis  Afib  PVD (peripheral vascular disease)  Neuropathy: (Right lower leg)  H/O cerebral aneurysm repair: brain clips  CVA (cerebral vascular accident): (&quot;Mini-stroke&quot;,1990&#x27;s)  Rheumatoid arthritis  Hyperlipidemia  Hypertension  S/P cataract surgery: (Left eye)  Elective surgery: (&quot;Twisted bowel&quot;, 2014)  Elective surgery: (Exision of cyst on liver, 1985)  S/P ORIF (open reduction internal fixation) fracture: Left hip fx (2012) &amp; R hip fx (2013)  H/O cerebral aneurysm repair: Brain clips (1978(  Renal stone: Cysto stent placement 10/1/2014  PVD (peripheral vascular disease): s/p RLE bypass x 3, most recent 3/2012 Right external iliac to PT bypass w/ PTFE (2012)  S/P FRED (total abdominal hysterectomy): (1987, Hx of &quot;ovarian cancer?&quot;)      For details regarding the patient's social history, family history, and other miscellaneous elements, please refer the initial infectious diseases consultation and/or the admitting history and physical examination for this admission.    ROS:  CONSTITUTIONAL:  Negative fever or chills, feels well  EYES:  Negative  blurry vision or double vision  CARDIOVASCULAR:  Negative for chest pain or palpitations  RESPIRATORY:  Negative for cough, wheezing, or SOB   GASTROINTESTINAL:  Negative for nausea, vomiting, diarrhea, constipation, or abdominal pain  GENITOURINARY:  Negative frequency, urgency or dysuria  NEUROLOGIC:  No headache, confusion, dizziness, lightheadedness  All other systems were reviewed and are negative       Current inpatient medications :    ANTIBIOTICS/RELEVANT:  ertapenem  IVPB      ertapenem  IVPB 500 milliGRAM(s) IV Intermittent every 24 hours      acetaminophen   Tablet 650 milliGRAM(s) Oral every 8 hours PRN  aspirin enteric coated 81 milliGRAM(s) Oral daily  atorvastatin 40 milliGRAM(s) Oral at bedtime  diltiazem    Tablet 30 milliGRAM(s) Oral every 6 hours  docusate sodium 100 milliGRAM(s) Oral three times a day  enoxaparin Injectable 50 milliGRAM(s) SubCutaneous daily  gabapentin 100 milliGRAM(s) Oral three times a day  glycerin Suppository - Adult 1 Suppository(s) Rectal daily  hydrALAZINE 50 milliGRAM(s) Oral three times a day  metoprolol tartrate 25 milliGRAM(s) Oral every 6 hours  mirtazapine 7.5 milliGRAM(s) Oral at bedtime  pantoprazole    Tablet 40 milliGRAM(s) Oral before breakfast  polyethylene glycol 3350 17 Gram(s) Oral two times a day  senna 2 Tablet(s) Oral at bedtime      Objective:    T(C): 36.5 (07-28-18 @ 21:10), Max: 36.5 (07-28-18 @ 21:10)  HR: 80 (07-28-18 @ 21:10) (67 - 80)  BP: 111/72 (07-28-18 @ 21:10) (103/68 - 148/77)  RR: 16 (07-28-18 @ 21:10) (16 - 17)  SpO2: 94% (07-28-18 @ 21:10) (94% - 97%)  Wt(kg): --      Physical Exam:  General: well developed well nourished, in no acute distress  Eyes: sclera anicteric, pupils equal and reactive to light  ENMT: buccal mucosa moist, pharynx not injected  Neck: supple, trachea midline  Lungs: clear, no wheeze/rhonchi  Cardiovascular: regular rate and rhythm, S1 S2  Abdomen: soft, nontender, no organomegaly present, bowel sounds normal  Neurological: alert and oriented x3, Cranial Nerves II-XII grossly intact  Skin: no increased ecchymosis/petechiae/purpura  Lymph Nodes: no palpable cervical/supraclavicular lymph nodes enlargements  Extremities: no cyanosis/clubbing/edema      LABS:                          8.5    7.54  )-----------( 268      ( 28 Jul 2018 06:18 )             28.7       07-28    146<H>  |  113<H>  |  49<H>  ----------------------------<  93  4.3   |  27  |  1.40<H>    Ca    8.1<L>      28 Jul 2018 06:18    TPro  5.9<L>  /  Alb  2.6<L>  /  TBili  0.3  /  DBili  .10  /  AST  24  /  ALT  37  /  AlkPhos  213<H>  07-28      PT/INR - ( 28 Jul 2018 06:18 )   PT: 20.1 sec;   INR: 1.82 ratio        Assessment :  82 yo female w/ PMH of afib on coumadin, CVA and hx of aneurysm repair, HTN, HLD, mitral   valve prolapse, PVD s/p stents x 3 (RLE), 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  Appropriate use of antibiotics and adverse effects reviewed.      I have discussed the above plan of care with patient/ family in detail. They expressed understanding of the  treatment plan . Risks, benefits and alternatives discussed in detail. I have asked if they have any questions or concerns and appropriately addressed them to the best of my ability .    > 35 minutes were spent in direct patient care reviewing notes, medications ,labs data/ imaging , discussion with multidisciplinary team.    Thank you for allowing me to participate in care of your patient .    Cynthia Jaimes MD  Infectious Disease  [phone]

## 2018-07-28 NOTE — PROGRESS NOTE ADULT - ATTENDING COMMENTS
I personally saw and examined the patient in detail.  I have spoken to the above provider regarding the assessment and plan of care.  I reviewed the above assessment and plan of care, and agree.  I have made changes in the body of the note where appropriate.

## 2018-07-28 NOTE — PROGRESS NOTE ADULT - PROBLEM SELECTOR PLAN 1
pt w hx of ercp w stent placement and biliary sepsis  US showed choledocholithiasis and cholelithiasis  sp ercp 7/25- occluded stent removed, multiple stones extracted  surgery eval appreciated, pt/family refusing lap rox at present  cont abx  repeat bld cxs ngtd  f/u ID recs  trend labs

## 2018-07-29 PROCEDURE — 99233 SBSQ HOSP IP/OBS HIGH 50: CPT

## 2018-07-29 PROCEDURE — 99232 SBSQ HOSP IP/OBS MODERATE 35: CPT

## 2018-07-29 RX ADMIN — Medication 25 MILLIGRAM(S): at 00:27

## 2018-07-29 RX ADMIN — Medication 50 MILLIGRAM(S): at 14:47

## 2018-07-29 RX ADMIN — ENOXAPARIN SODIUM 50 MILLIGRAM(S): 100 INJECTION SUBCUTANEOUS at 12:04

## 2018-07-29 RX ADMIN — GABAPENTIN 100 MILLIGRAM(S): 400 CAPSULE ORAL at 05:30

## 2018-07-29 RX ADMIN — PANTOPRAZOLE SODIUM 40 MILLIGRAM(S): 20 TABLET, DELAYED RELEASE ORAL at 06:27

## 2018-07-29 RX ADMIN — Medication 25 MILLIGRAM(S): at 12:03

## 2018-07-29 RX ADMIN — Medication 100 MILLIGRAM(S): at 21:09

## 2018-07-29 RX ADMIN — Medication 81 MILLIGRAM(S): at 12:01

## 2018-07-29 RX ADMIN — ATORVASTATIN CALCIUM 40 MILLIGRAM(S): 80 TABLET, FILM COATED ORAL at 21:09

## 2018-07-29 RX ADMIN — Medication 50 MILLIGRAM(S): at 21:09

## 2018-07-29 RX ADMIN — Medication 100 MILLIGRAM(S): at 14:46

## 2018-07-29 RX ADMIN — GABAPENTIN 100 MILLIGRAM(S): 400 CAPSULE ORAL at 14:48

## 2018-07-29 RX ADMIN — SENNA PLUS 2 TABLET(S): 8.6 TABLET ORAL at 21:09

## 2018-07-29 RX ADMIN — ERTAPENEM SODIUM 100 MILLIGRAM(S): 1 INJECTION, POWDER, LYOPHILIZED, FOR SOLUTION INTRAMUSCULAR; INTRAVENOUS at 14:46

## 2018-07-29 RX ADMIN — MIRTAZAPINE 7.5 MILLIGRAM(S): 45 TABLET, ORALLY DISINTEGRATING ORAL at 21:09

## 2018-07-29 RX ADMIN — Medication 100 MILLIGRAM(S): at 05:31

## 2018-07-29 RX ADMIN — Medication 50 MILLIGRAM(S): at 05:31

## 2018-07-29 RX ADMIN — Medication 25 MILLIGRAM(S): at 05:31

## 2018-07-29 RX ADMIN — GABAPENTIN 100 MILLIGRAM(S): 400 CAPSULE ORAL at 21:09

## 2018-07-29 NOTE — PROGRESS NOTE ADULT - ASSESSMENT
82 yo female w/ PMH of PAF on AC, CVA and hx of aneurysm repair, HTN, HLD, mitral valve prolapse, PAD, RA, HFPEF p/w urosepsis    - HIDA with no evidence of acute rox.  s/p ERCP.  s/p multiple stones removal and extraction of occluded stent.  tolerated procedure well with no cardiac complications  - Last echo 2/2018 with grossly normal LV systolic function and Diastolic dysfunction grade 2.  No need to repeat echo at this time.  - ECG with no evidence of ischemia.    - Monitor and replete electrolytes as necessary. Keep K>4.0 and Mg>2.0.   - HTN better controlled.  continue present meds: Hydralazine and Lopressor   - h/p PAF, rate controlled.  Continue Cardizem and metoprolol at current doses.   - Dose Coumadin. Goal INR 2-3. Needs Daily INRs.   Yesterday INR 1.82 subtherapeutic.  Today pending.  - s/p PICC ( needs long term abx)   - cont Aspirin   - Monitor H/H and for clinical signs of bleeding.   - PAD: currently disease is severe and not amenable to revascularization.   - Please continue to maintain strict I/Os, monitor daily weights, Cr. Avoid nephrotoxic agents.    - Further cardiac workup will depend on clinical course.   - All other workup per primary team.  - Will cont to follow

## 2018-07-29 NOTE — PROGRESS NOTE ADULT - SUBJECTIVE AND OBJECTIVE BOX
[facility] - Dahiana Bañuelos, Evens, Toney, Terrie, Filiberto Laughlin  Office Number:  [phone]    Patient resting comfortably in bed in NAD.  Laying flat with no respiratory distress.  No complaints of chest pain, dyspnea, palpitations, PND, or orthopnea.    F/U for:  Atrial fibrillation, HTN, CVA, HFpEF      MEDICATIONS  (STANDING):  aspirin enteric coated 81 milliGRAM(s) Oral daily  atorvastatin 40 milliGRAM(s) Oral at bedtime  diltiazem    Tablet 30 milliGRAM(s) Oral every 6 hours  docusate sodium 100 milliGRAM(s) Oral three times a day  enoxaparin Injectable 50 milliGRAM(s) SubCutaneous daily  ertapenem  IVPB      ertapenem  IVPB 500 milliGRAM(s) IV Intermittent every 24 hours  gabapentin 100 milliGRAM(s) Oral three times a day  glycerin Suppository - Adult 1 Suppository(s) Rectal daily  hydrALAZINE 50 milliGRAM(s) Oral three times a day  metoprolol tartrate 25 milliGRAM(s) Oral every 6 hours  mirtazapine 7.5 milliGRAM(s) Oral at bedtime  pantoprazole    Tablet 40 milliGRAM(s) Oral before breakfast  polyethylene glycol 3350 17 Gram(s) Oral two times a day  senna 2 Tablet(s) Oral at bedtime    MEDICATIONS  (PRN):  acetaminophen   Tablet 650 milliGRAM(s) Oral every 8 hours PRN Mild Pain (1 - 3)      Allergies    No Known Allergies          Vital Signs Last 24 Hrs  T(C): 36.3 (29 Jul 2018 05:26), Max: 36.5 (28 Jul 2018 21:10)  T(F): 97.4 (29 Jul 2018 05:26), Max: 97.7 (28 Jul 2018 21:10)  HR: 67 (29 Jul 2018 05:26) (67 - 80)  BP: 154/84 (29 Jul 2018 05:26) (103/68 - 154/84)  BP(mean): --  RR: 16 (29 Jul 2018 05:26) (16 - 17)  SpO2: 95% (29 Jul 2018 05:26) (94% - 97%)    I&O's Summary      ON EXAM:    General: NAD, awake and alert, oriented x 3  HEENT: Mucous membranes are moist, anicteric  Lungs: Non-labored, breath sounds are clear bilaterally, No wheezing, rales or rhonchi  Cardiovascular: Regular, S1 and S2, no murmurs, rubs, or gallops  Gastrointestinal: Bowel Sounds present, soft, nontender.   Lymph: No peripheral edema. No lymphadenopathy.  Skin: No rashes or ulcers  Psych:  Mood & affect appropriate    LABS: All Labs Reviewed:                        8.5    7.54  )-----------( 268      ( 28 Jul 2018 06:18 )             28.7                         9.2    x     )-----------( x        ( 27 Jul 2018 16:03 )             29.6                         8.5    12.40 )-----------( 273      ( 27 Jul 2018 07:02 )             27.5     28 Jul 2018 06:18    146    |  113    |  49     ----------------------------<  93     4.3     |  27     |  1.40   27 Jul 2018 07:02    144    |  110    |  54     ----------------------------<  87     4.2     |  26     |  1.60     Ca    8.1        28 Jul 2018 06:18  Ca    8.3        27 Jul 2018 07:02    TPro  5.9    /  Alb  2.6    /  TBili  0.3    /  DBili  .10    /  AST  24     /  ALT  37     /  AlkPhos  213    28 Jul 2018 06:18  TPro  5.9    /  Alb  2.5    /  TBili  0.3    /  DBili  .10    /  AST  22     /  ALT  37     /  AlkPhos  212    27 Jul 2018 07:02    PT/INR - ( 28 Jul 2018 06:18 )   PT: 20.1 sec;   INR: 1.82 ratio

## 2018-07-29 NOTE — PROGRESS NOTE ADULT - SUBJECTIVE AND OBJECTIVE BOX
cc acute cholangitis s/p bilary stent removal 7- fu     HPI     no recurrence of pain no nv no fever no jaundice     seen sitting in chair near the door     ROS: [  ] Fever  [  ] Chills  [  ]Chest Pain [  ] SOB  [  ]Cough [  ] N/V  [  ] Diarrhea [  ]Constipation [  ]Other ROS:  [ x ] ROS otherwise negative  ---------------------------------------------------------------------------  I personally reviewed: [  ]EKG   [  ]CXR    [  ] CT    [  ]Other  ---------------------------------------------------------------------------  PLEASE CHECK WHEN PRESENT:  [  ]Heart Failure     [  ] Acute     [  ] Acute on Chronic     [  ] Chronic  -------------------------------------------------------------------     [  ]Diastolic [HFpEF]     [  ]Systolic [HFrEF]     [  ]Combined [HFpEF & HFrEF]     [  ]Other:  -------------------------------------------------------------------  [  ]BRIDGET     [  ]ATN     [  ]Reneal Medullary Necrosis     [  ]Renal Cortical Necrosis     [  ]Other Pathological Lesions:    [  ] CKD 1  [  ]CKD 2  [  ]CKD 3  [  ]CKD 4  [  ]CKD 5  [  ]Other  -------------------------------------------------------------------  [  ]Other/Unspecified:    --------------------------------------------------------------------    Abdominal Nutritional Status  [  ]Malnutrition: See Nutrition Note  [  ] Cachexia  [  ]Other:   [  ]Supplement Ordered:  [  ] Morbid Obesity (BMI >=40]    Allergies    No Known Allergies    Intolerances    MEDICATIONS  (STANDING):  aspirin enteric coated 81 milliGRAM(s) Oral daily  atorvastatin 40 milliGRAM(s) Oral at bedtime  diltiazem    Tablet 30 milliGRAM(s) Oral every 6 hours  docusate sodium 100 milliGRAM(s) Oral three times a day  enoxaparin Injectable 50 milliGRAM(s) SubCutaneous daily  ertapenem  IVPB      ertapenem  IVPB 500 milliGRAM(s) IV Intermittent every 24 hours  gabapentin 100 milliGRAM(s) Oral three times a day  glycerin Suppository - Adult 1 Suppository(s) Rectal daily  hydrALAZINE 50 milliGRAM(s) Oral three times a day  metoprolol tartrate 25 milliGRAM(s) Oral every 6 hours  mirtazapine 7.5 milliGRAM(s) Oral at bedtime  pantoprazole    Tablet 40 milliGRAM(s) Oral before breakfast  polyethylene glycol 3350 17 Gram(s) Oral two times a day  senna 2 Tablet(s) Oral at bedtime        Physical Exam:   [  ] Exam as per Resident    Vital Signs Last 24 Hrs  T(C): 36.3 (29 Jul 2018 05:26), Max: 36.5 (28 Jul 2018 21:10)  T(F): 97.4 (29 Jul 2018 05:26), Max: 97.7 (28 Jul 2018 21:10)  HR: 67 (29 Jul 2018 05:26) (67 - 80)  BP: 154/84 (29 Jul 2018 05:26) (103/68 - 154/84)  BP(mean): --  RR: 16 (29 Jul 2018 05:26) (16 - 17)  SpO2: 95% (29 Jul 2018 05:26) (94% - 95%)    I&O's Detail      General: [  ]Well Appearing   [  ]Ill Appearing [  ]Lethargic [  ]Agitated [  ]Pale [  ]Cachectic  HEENT: [  ]Nonicteric [  ]SHY [  ]Other:  CV: [  ] RRR   [  ]Irregularly Irregular  [  ] No JVD [  ]Murmur:                      [  ]Other:  Lungs: [  ]CTAB  [  ]Other:  ABD: [  ]Soft  [  ]+Bowel Sounds  [  ] Nontender  [  ]NonDistended  [  ] No HSM   [  ] Other  Ext: [  ]2+Pulses  [  ]No C/C/E  [  ]Edema:              [  ] Other:  Skin: [  ] Warm and Dry  [  ]Poor Skin Turgor  [  ]Rash:  : [  ]Bateman  [  ]Other:  Neuro: [  ] AO x 3  [  ]Confused  [  ]Non-focal  [  ]Encephalopathic  [  ]CN II-XII Grossly Intact  [  ] Neuro Expanded:    Other:     Restraints: [  ]No   [  ]Yes   [  ] Why:    LABS: [  ]Labs Reviewed  [  ] No Labs Ordered                        8.5    7.54  )-----------( 268      ( 28 Jul 2018 06:18 )             28.7     07-28    146<H>  |  113<H>  |  49<H>  ----------------------------<  93  4.3   |  27  |  1.40<H>    Ca    8.1<L>      28 Jul 2018 06:18    TPro  5.9<L>  /  Alb  2.6<L>  /  TBili  0.3  /  DBili  .10  /  AST  24  /  ALT  37  /  AlkPhos  213<H>  07-28    PT/INR - ( 28 Jul 2018 06:18 )   PT: 20.1 sec;   INR: 1.82 ratio                 RADIOLOGY & ADDITIONAL STUDIES: cc acute cholangitis s/p bilary stent removal 7- fu     HPI     no recurrence of pain no nv no fever no jaundice     seen sitting in chair near the door     ROS: [  ] Fever  [  ] Chills  [  ]Chest Pain [  ] SOB  [  ]Cough [  ] N/V  [  ] Diarrhea [  ]Constipation [  ]Other ROS:  [ x ] ROS otherwise negative  ---------------------------------------------------------------------------  I personally reviewed: [  ]EKG   [  ]CXR    [  ] CT    [  ]Other  ---------------------------------------------------------------------------  PLEASE CHECK WHEN PRESENT:  [  ]Heart Failure     [  ] Acute     [  ] Acute on Chronic     [  ] Chronic  -------------------------------------------------------------------     [  ]Diastolic [HFpEF]     [  ]Systolic [HFrEF]     [  ]Combined [HFpEF & HFrEF]     [  ]Other:  -------------------------------------------------------------------  [  ]BRIDGET     [  ]ATN     [  ]Reneal Medullary Necrosis     [  ]Renal Cortical Necrosis     [  ]Other Pathological Lesions:    [  ] CKD 1  [  ]CKD 2  [  ]CKD 3  [  ]CKD 4  [  ]CKD 5  [  ]Other  -------------------------------------------------------------------  [  ]Other/Unspecified:    --------------------------------------------------------------------    Abdominal Nutritional Status  [  ]Malnutrition: See Nutrition Note  [  ] Cachexia  [  ]Other:   [  ]Supplement Ordered:  [  ] Morbid Obesity (BMI >=40]    Allergies    No Known Allergies    Intolerances    MEDICATIONS  (STANDING):  aspirin enteric coated 81 milliGRAM(s) Oral daily  atorvastatin 40 milliGRAM(s) Oral at bedtime  diltiazem    Tablet 30 milliGRAM(s) Oral every 6 hours  docusate sodium 100 milliGRAM(s) Oral three times a day  enoxaparin Injectable 50 milliGRAM(s) SubCutaneous daily  ertapenem  IVPB      ertapenem  IVPB 500 milliGRAM(s) IV Intermittent every 24 hours  gabapentin 100 milliGRAM(s) Oral three times a day  glycerin Suppository - Adult 1 Suppository(s) Rectal daily  hydrALAZINE 50 milliGRAM(s) Oral three times a day  metoprolol tartrate 25 milliGRAM(s) Oral every 6 hours  mirtazapine 7.5 milliGRAM(s) Oral at bedtime  pantoprazole    Tablet 40 milliGRAM(s) Oral before breakfast  polyethylene glycol 3350 17 Gram(s) Oral two times a day  senna 2 Tablet(s) Oral at bedtime        Physical Exam:   [  ] Exam as per Resident    Vital Signs Last 24 Hrs  T(C): 36.3 (29 Jul 2018 05:26), Max: 36.5 (28 Jul 2018 21:10)  T(F): 97.4 (29 Jul 2018 05:26), Max: 97.7 (28 Jul 2018 21:10)  HR: 67 (29 Jul 2018 05:26) (67 - 80)  BP: 154/84 (29 Jul 2018 05:26) (103/68 - 154/84)  BP(mean): --  RR: 16 (29 Jul 2018 05:26) (16 - 17)  SpO2: 95% (29 Jul 2018 05:26) (94% - 95%)    I&O's Detail      General: [  ]Well Appearing   [  ]Ill Appearing [  ]Lethargic [  ]Agitated [  ]Pale [  ]Cachectic  HEENT: [  ]Nonicteric [  ]SHY [  ]Other:  CV: [  ] RRR   [  ]Irregularly Irregular  [  ] No JVD [  ]Murmur:                      [  ]Other:  Lungs: [  ]CTAB  [  ]Other:  ABD: [  ]Soft  [  ]+Bowel Sounds  [  ] Nontender  [  ]NonDistended  [  ] No HSM   [  ] Other  Ext: [  ]2+Pulses  [  ]No C/C/E  [  ]Edema:              [  ] Other:  Skin: [  ] Warm and Dry  [  ]Poor Skin Turgor  [  ]Rash:  : [  ]Bateman  [  ]Other:  Neuro: [  ] AO x 3  [  ]Confused  [  ]Non-focal  [  ]Encephalopathic  [  ]CN II-XII Grossly Intact  [  ] Neuro Expanded:    Other:     Restraints: [  ]No   [  ]Yes   [  ] Why:    LABS: [  ]Labs Reviewed  [  ] No Labs Ordered                        8.5    7.54  )-----------( 268      ( 28 Jul 2018 06:18 )             28.7     07-28    146<H>  |  113<H>  |  49<H>  ----------------------------<  93  4.3   |  27  |  1.40<H>    Ca    8.1<L>      28 Jul 2018 06:18    TPro  5.9<L>  /  Alb  2.6<L>  /  TBili  0.3  /  DBili  .10  /  AST  24  /  ALT  37  /  AlkPhos  213<H>  07-28    PT/INR - ( 28 Jul 2018 06:18 )   PT: 20.1 sec;   INR: 1.82 ratio         RADIOLOGY & ADDITIONAL STUDIES:

## 2018-07-29 NOTE — PROGRESS NOTE ADULT - ASSESSMENT
Acute cholangitis with BSI s/p removal of existing bilary stent due to stone occlusion.  Surveillance blood cultures negative   GI, surgery and ID assistance appreciated.    PICC to complete invanz till Tues 7-.   Patient refuses lap chol.   RA will hold immunesuppresive agents til Acute cholangitis with BSI s/p removal of existing bilary stent due to stone occlusion.  Surveillance blood cultures negative   GI, surgery and ID assistance appreciated.    PICC to complete invanz till Tues 7-.   Patient refuses lap chol.   RA not on immunosuppressive agents     CVA hx with s/p coil for aneurysm   AF on coumadin   INR goal 2-3  Cards assistance appreciated     High risk for morbidity, mortality secondary to the above mentioned medical conditions   reviewed with patient plan of care   vte prop

## 2018-07-29 NOTE — PROGRESS NOTE ADULT - PROBLEM SELECTOR PLAN 3
am labs pending  fobt neg  anemia likely multifactorial  no overt s/s gib  no active bleeding seen on recent scope  serial cbcs, transfuse prn  cont ppi  consider iron studies/heme eval

## 2018-07-29 NOTE — PROGRESS NOTE ADULT - PROBLEM SELECTOR PLAN 1
pt w hx of ercp w stent placement and biliary sepsis  US showed choledocholithiasis and cholelithiasis  sp ercp 7/25- occluded stent removed, multiple stones extracted  surgery eval appreciated, pt/family refusing lap orx at present  cont abx  repeat bld cxs ngtd  f/u ID recs  trend labs

## 2018-07-29 NOTE — PROGRESS NOTE ADULT - SUBJECTIVE AND OBJECTIVE BOX
CARITO CONSTANTINO is a 83yFemale , patient examined and chart reviewed.    INTERVAL HPI/ OVERNIGHT EVENTS:   Afebrile. No events.    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  Osteoarthritis  Afib  PVD (peripheral vascular disease)  Neuropathy: (Right lower leg)  H/O cerebral aneurysm repair: brain clips  CVA (cerebral vascular accident): (&quot;Mini-stroke&quot;,1990&#x27;s)  Rheumatoid arthritis  Hyperlipidemia  Hypertension  S/P cataract surgery: (Left eye)  Elective surgery: (&quot;Twisted bowel&quot;, 2014)  Elective surgery: (Exision of cyst on liver, 1985)  S/P ORIF (open reduction internal fixation) fracture: Left hip fx (2012) &amp; R hip fx (2013)  H/O cerebral aneurysm repair: Brain clips (1978(  Renal stone: Cysto stent placement 10/1/2014  PVD (peripheral vascular disease): s/p RLE bypass x 3, most recent 3/2012 Right external iliac to PT bypass w/ PTFE (2012)  S/P FRED (total abdominal hysterectomy): (1987, Hx of &quot;ovarian cancer?&quot;)      For details regarding the patient's social history, family history, and other miscellaneous elements, please refer the initial infectious diseases consultation and/or the admitting history and physical examination for this admission.    ROS:  CONSTITUTIONAL:  Negative fever or chills  EYES:  Negative  blurry vision or double vision  CARDIOVASCULAR:  Negative for chest pain or palpitations  RESPIRATORY:  Negative for cough, wheezing, or SOB   GASTROINTESTINAL:  Negative for nausea, vomiting, diarrhea, constipation, or abdominal pain  GENITOURINARY:  Negative frequency, urgency or dysuria  NEUROLOGIC:  No headache, confusion, dizziness, lightheadedness  All other systems were reviewed and are negative     Current inpatient medications :    ANTIBIOTICS/RELEVANT:  ertapenem  IVPB      ertapenem  IVPB 500 milliGRAM(s) IV Intermittent every 24 hours      acetaminophen   Tablet 650 milliGRAM(s) Oral every 8 hours PRN  aspirin enteric coated 81 milliGRAM(s) Oral daily  atorvastatin 40 milliGRAM(s) Oral at bedtime  diltiazem    Tablet 30 milliGRAM(s) Oral every 6 hours  docusate sodium 100 milliGRAM(s) Oral three times a day  enoxaparin Injectable 50 milliGRAM(s) SubCutaneous daily  gabapentin 100 milliGRAM(s) Oral three times a day  glycerin Suppository - Adult 1 Suppository(s) Rectal daily  hydrALAZINE 50 milliGRAM(s) Oral three times a day  metoprolol tartrate 25 milliGRAM(s) Oral every 6 hours  mirtazapine 7.5 milliGRAM(s) Oral at bedtime  pantoprazole    Tablet 40 milliGRAM(s) Oral before breakfast  polyethylene glycol 3350 17 Gram(s) Oral two times a day  senna 2 Tablet(s) Oral at bedtime      Objective:    07-28 @ 07:01  -  07-29 @ 07:00  --------------------------------------------------------  IN: 50 mL / OUT: 0 mL / NET: 50 mL    07-29 @ 07:01  -  07-29 @ 21:41  --------------------------------------------------------  IN: 50 mL / OUT: 0 mL / NET: 50 mL      T(C): 36.3 (07-29-18 @ 21:01), Max: 36.6 (07-29-18 @ 13:30)  HR: 77 (07-29-18 @ 21:01) (67 - 80)  BP: 126/80 (07-29-18 @ 21:01) (92/60 - 154/84)  RR: 16 (07-29-18 @ 21:01) (16 - 17)  SpO2: 95% (07-29-18 @ 21:01) (94% - 95%)  Wt(kg): --      Physical Exam:  General: well developed well nourished, in no acute distress  Eyes: sclera anicteric, pupils equal and reactive to light  ENMT: buccal mucosa moist, pharynx not injected  Neck: supple, trachea midline  Lungs: clear, no wheeze/rhonchi  Cardiovascular: regular rate and rhythm, S1 S2  Abdomen: soft, nontender, no organomegaly present, bowel sounds normal  Neurological: alert and oriented x3, Cranial Nerves II-XII grossly intact  Skin: no increased ecchymosis/petechiae/purpura  Lymph Nodes: no palpable cervical/supraclavicular lymph nodes enlargements  Extremities: no cyanosis/clubbing/edema      LABS:                          8.5    7.54  )-----------( 268      ( 28 Jul 2018 06:18 )             28.7       07-28    146<H>  |  113<H>  |  49<H>  ----------------------------<  93  4.3   |  27  |  1.40<H>    Ca    8.1<L>      28 Jul 2018 06:18    TPro  5.9<L>  /  Alb  2.6<L>  /  TBili  0.3  /  DBili  .10  /  AST  24  /  ALT  37  /  AlkPhos  213<H>  07-28      PT/INR - ( 28 Jul 2018 06:18 )   PT: 20.1 sec;   INR: 1.82 ratio      Assessment :  84 yo female w/ PMH of afib on coumadin, CVA and hx of aneurysm repair, HTN, HLD, mitral   valve prolapse, PVD s/p stents x 3 (RLE), 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  Appropriate use of antibiotics and adverse effects reviewed.      I have discussed the above plan of care with patient/ family in detail. They expressed understanding of the  treatment plan . Risks, benefits and alternatives discussed in detail. I have asked if they have any questions or concerns and appropriately addressed them to the best of my ability .    > 35 minutes were spent in direct patient care reviewing notes, medications ,labs data/ imaging , discussion with multidisciplinary team.    Thank you for allowing me to participate in care of your patient .    Cynthia Jaimes MD  Infectious Disease  [phone]

## 2018-07-29 NOTE — PROGRESS NOTE ADULT - SUBJECTIVE AND OBJECTIVE BOX
INTERVAL HPI/OVERNIGHT EVENTS:  pt seen and examined  pt denies n/v/abd pain, bm x 2 yesterday  per overt rn no s/s overt gib  no new labs afebrile overnight    MEDICATIONS  (STANDING):  aspirin enteric coated 81 milliGRAM(s) Oral daily  atorvastatin 40 milliGRAM(s) Oral at bedtime  diltiazem    Tablet 30 milliGRAM(s) Oral every 6 hours  docusate sodium 100 milliGRAM(s) Oral three times a day  enoxaparin Injectable 50 milliGRAM(s) SubCutaneous daily  ertapenem  IVPB      ertapenem  IVPB 500 milliGRAM(s) IV Intermittent every 24 hours  gabapentin 100 milliGRAM(s) Oral three times a day  glycerin Suppository - Adult 1 Suppository(s) Rectal daily  hydrALAZINE 50 milliGRAM(s) Oral three times a day  metoprolol tartrate 25 milliGRAM(s) Oral every 6 hours  mirtazapine 7.5 milliGRAM(s) Oral at bedtime  pantoprazole    Tablet 40 milliGRAM(s) Oral before breakfast  polyethylene glycol 3350 17 Gram(s) Oral two times a day  senna 2 Tablet(s) Oral at bedtime    MEDICATIONS  (PRN):  acetaminophen   Tablet 650 milliGRAM(s) Oral every 8 hours PRN Mild Pain (1 - 3)      Allergies    No Known Allergies    Intolerances        Review of Systems:    General:  No wt loss, fevers, chills, night sweats, fatigue   Eyes:  Good vision, no reported pain  ENT:  No sore throat, pain, runny nose, dysphagia  CV:  No pain, palpitations, hypo/hypertension  Resp:  No dyspnea, cough, tachypnea, wheezing  GI:  No pain, No nausea, No vomiting, No diarrhea, No constipation, No weight loss, No fever, No pruritis, No rectal bleeding, No melena, No dysphagia  :  No pain, bleeding, incontinence, nocturia  Muscle:  No pain, weakness  Neuro:  No weakness, tingling, memory problems  Psych:  No fatigue, insomnia, mood problems, depression  Endocrine:  No polyuria, polydypsia, cold/heat intolerance  Heme:  No petechiae, ecchymosis, easy bruisability  Skin:  No rash, tattoos, scars, edema      Vital Signs Last 24 Hrs  T(C): 36.3 (29 Jul 2018 05:26), Max: 36.5 (28 Jul 2018 21:10)  T(F): 97.4 (29 Jul 2018 05:26), Max: 97.7 (28 Jul 2018 21:10)  HR: 67 (29 Jul 2018 05:26) (67 - 80)  BP: 154/84 (29 Jul 2018 05:26) (103/68 - 154/84)  BP(mean): --  RR: 16 (29 Jul 2018 05:26) (16 - 17)  SpO2: 95% (29 Jul 2018 05:26) (94% - 97%)    PHYSICAL EXAM:    Constitutional: NAD, lying in bed  HEENT: ncat  Neck: No LAD  Respiratory: dec bs  Cardiovascular: S1 and S2, RRR  Gastrointestinal: BS+, soft,  nt, nd  Extremities: No peripheral edema  Vascular: 2+ peripheral pulses  Neurological: Awake alert responds appropriately  Skin: No rashes      LABS:                        8.5    7.54  )-----------( 268      ( 28 Jul 2018 06:18 )             28.7     07-28    146<H>  |  113<H>  |  49<H>  ----------------------------<  93  4.3   |  27  |  1.40<H>    Ca    8.1<L>      28 Jul 2018 06:18    TPro  5.9<L>  /  Alb  2.6<L>  /  TBili  0.3  /  DBili  .10  /  AST  24  /  ALT  37  /  AlkPhos  213<H>  07-28    PT/INR - ( 28 Jul 2018 06:18 )   PT: 20.1 sec;   INR: 1.82 ratio               RADIOLOGY & ADDITIONAL TESTS:

## 2018-07-30 DIAGNOSIS — L97.511 NON-PRESSURE CHRONIC ULCER OF OTHER PART OF RIGHT FOOT LIMITED TO BREAKDOWN OF SKIN: ICD-10-CM

## 2018-07-30 LAB
ANION GAP SERPL CALC-SCNC: 5 MMOL/L — SIGNIFICANT CHANGE UP (ref 5–17)
APTT BLD: 33.9 SEC — SIGNIFICANT CHANGE UP (ref 27.5–37.4)
BUN SERPL-MCNC: 34 MG/DL — HIGH (ref 7–23)
CALCIUM SERPL-MCNC: 8.6 MG/DL — SIGNIFICANT CHANGE UP (ref 8.5–10.1)
CHLORIDE SERPL-SCNC: 110 MMOL/L — HIGH (ref 96–108)
CO2 SERPL-SCNC: 28 MMOL/L — SIGNIFICANT CHANGE UP (ref 22–31)
CREAT SERPL-MCNC: 1.2 MG/DL — SIGNIFICANT CHANGE UP (ref 0.5–1.3)
GLUCOSE SERPL-MCNC: 111 MG/DL — HIGH (ref 70–99)
HCT VFR BLD CALC: 31.3 % — LOW (ref 34.5–45)
HGB BLD-MCNC: 9.7 G/DL — LOW (ref 11.5–15.5)
INR BLD: 1.49 RATIO — HIGH (ref 0.88–1.16)
MCHC RBC-ENTMCNC: 26.1 PG — LOW (ref 27–34)
MCHC RBC-ENTMCNC: 31 GM/DL — LOW (ref 32–36)
MCV RBC AUTO: 84.4 FL — SIGNIFICANT CHANGE UP (ref 80–100)
NRBC # BLD: 0 /100 WBCS — SIGNIFICANT CHANGE UP (ref 0–0)
PLATELET # BLD AUTO: 297 K/UL — SIGNIFICANT CHANGE UP (ref 150–400)
POTASSIUM SERPL-MCNC: 3.9 MMOL/L — SIGNIFICANT CHANGE UP (ref 3.5–5.3)
POTASSIUM SERPL-SCNC: 3.9 MMOL/L — SIGNIFICANT CHANGE UP (ref 3.5–5.3)
PROTHROM AB SERPL-ACNC: 16.4 SEC — HIGH (ref 9.8–12.7)
RBC # BLD: 3.71 M/UL — LOW (ref 3.8–5.2)
RBC # FLD: 17.9 % — HIGH (ref 10.3–14.5)
SODIUM SERPL-SCNC: 143 MMOL/L — SIGNIFICANT CHANGE UP (ref 135–145)
WBC # BLD: 7.83 K/UL — SIGNIFICANT CHANGE UP (ref 3.8–10.5)
WBC # FLD AUTO: 7.83 K/UL — SIGNIFICANT CHANGE UP (ref 3.8–10.5)

## 2018-07-30 PROCEDURE — 73630 X-RAY EXAM OF FOOT: CPT | Mod: 26,50

## 2018-07-30 PROCEDURE — 99232 SBSQ HOSP IP/OBS MODERATE 35: CPT

## 2018-07-30 PROCEDURE — 99233 SBSQ HOSP IP/OBS HIGH 50: CPT

## 2018-07-30 RX ORDER — DILTIAZEM HCL 120 MG
120 CAPSULE, EXT RELEASE 24 HR ORAL DAILY
Qty: 0 | Refills: 0 | Status: DISCONTINUED | OUTPATIENT
Start: 2018-07-30 | End: 2018-08-01

## 2018-07-30 RX ORDER — WARFARIN SODIUM 2.5 MG/1
5 TABLET ORAL ONCE
Qty: 0 | Refills: 0 | Status: COMPLETED | OUTPATIENT
Start: 2018-07-30 | End: 2018-07-30

## 2018-07-30 RX ORDER — WARFARIN SODIUM 2.5 MG/1
3 TABLET ORAL ONCE
Qty: 0 | Refills: 0 | Status: DISCONTINUED | OUTPATIENT
Start: 2018-07-30 | End: 2018-07-30

## 2018-07-30 RX ORDER — METOPROLOL TARTRATE 50 MG
100 TABLET ORAL DAILY
Qty: 0 | Refills: 0 | Status: DISCONTINUED | OUTPATIENT
Start: 2018-07-30 | End: 2018-08-01

## 2018-07-30 RX ORDER — POTASSIUM CHLORIDE 20 MEQ
20 PACKET (EA) ORAL ONCE
Qty: 0 | Refills: 0 | Status: COMPLETED | OUTPATIENT
Start: 2018-07-30 | End: 2018-07-30

## 2018-07-30 RX ADMIN — Medication 50 MILLIGRAM(S): at 05:51

## 2018-07-30 RX ADMIN — MIRTAZAPINE 7.5 MILLIGRAM(S): 45 TABLET, ORALLY DISINTEGRATING ORAL at 21:08

## 2018-07-30 RX ADMIN — ENOXAPARIN SODIUM 50 MILLIGRAM(S): 100 INJECTION SUBCUTANEOUS at 13:11

## 2018-07-30 RX ADMIN — WARFARIN SODIUM 5 MILLIGRAM(S): 2.5 TABLET ORAL at 21:08

## 2018-07-30 RX ADMIN — Medication 25 MILLIGRAM(S): at 00:38

## 2018-07-30 RX ADMIN — Medication 50 MILLIGRAM(S): at 21:08

## 2018-07-30 RX ADMIN — Medication 120 MILLIGRAM(S): at 13:11

## 2018-07-30 RX ADMIN — SENNA PLUS 2 TABLET(S): 8.6 TABLET ORAL at 21:07

## 2018-07-30 RX ADMIN — Medication 100 MILLIGRAM(S): at 21:08

## 2018-07-30 RX ADMIN — GABAPENTIN 100 MILLIGRAM(S): 400 CAPSULE ORAL at 21:08

## 2018-07-30 RX ADMIN — Medication 25 MILLIGRAM(S): at 05:52

## 2018-07-30 RX ADMIN — PANTOPRAZOLE SODIUM 40 MILLIGRAM(S): 20 TABLET, DELAYED RELEASE ORAL at 06:32

## 2018-07-30 RX ADMIN — Medication 100 MILLIGRAM(S): at 05:51

## 2018-07-30 RX ADMIN — Medication 100 MILLIGRAM(S): at 13:11

## 2018-07-30 RX ADMIN — ERTAPENEM SODIUM 100 MILLIGRAM(S): 1 INJECTION, POWDER, LYOPHILIZED, FOR SOLUTION INTRAMUSCULAR; INTRAVENOUS at 13:53

## 2018-07-30 RX ADMIN — Medication 20 MILLIEQUIVALENT(S): at 13:11

## 2018-07-30 RX ADMIN — ATORVASTATIN CALCIUM 40 MILLIGRAM(S): 80 TABLET, FILM COATED ORAL at 21:08

## 2018-07-30 RX ADMIN — Medication 81 MILLIGRAM(S): at 13:11

## 2018-07-30 RX ADMIN — GABAPENTIN 100 MILLIGRAM(S): 400 CAPSULE ORAL at 13:11

## 2018-07-30 RX ADMIN — GABAPENTIN 100 MILLIGRAM(S): 400 CAPSULE ORAL at 05:52

## 2018-07-30 RX ADMIN — Medication 50 MILLIGRAM(S): at 13:11

## 2018-07-30 NOTE — PROGRESS NOTE ADULT - ATTENDING COMMENTS
D/w Patient and daughter Dixie. Patient states that has been seeing a Vascular surgeon as out patient. Podiatry and vascular ( Dr. Scott saldaña)  consults called.  Continue IV antibiotics x 3 more days.

## 2018-07-30 NOTE — PROGRESS NOTE ADULT - PROBLEM SELECTOR PLAN 3
no new labs  fobt neg  anemia likely multifactorial  no overt s/s gib  no active bleeding seen on recent scope  serial cbcs, transfuse prn  cont ppi  consider iron studies/heme eval

## 2018-07-30 NOTE — PROGRESS NOTE ADULT - SUBJECTIVE AND OBJECTIVE BOX
Patient is a 83y old  Female who presents with a chief complaint of fever, x 1 week, weakness (23 Jul 2018 10:18)      INTERVAL HPI/OVERNIGHT EVENTS:    MEDICATIONS  (STANDING):  aspirin enteric coated 81 milliGRAM(s) Oral daily  atorvastatin 40 milliGRAM(s) Oral at bedtime  diltiazem    Tablet 30 milliGRAM(s) Oral every 6 hours  docusate sodium 100 milliGRAM(s) Oral three times a day  enoxaparin Injectable 50 milliGRAM(s) SubCutaneous daily  ertapenem  IVPB      ertapenem  IVPB 500 milliGRAM(s) IV Intermittent every 24 hours  gabapentin 100 milliGRAM(s) Oral three times a day  glycerin Suppository - Adult 1 Suppository(s) Rectal daily  hydrALAZINE 50 milliGRAM(s) Oral three times a day  metoprolol tartrate 25 milliGRAM(s) Oral every 6 hours  mirtazapine 7.5 milliGRAM(s) Oral at bedtime  pantoprazole    Tablet 40 milliGRAM(s) Oral before breakfast  polyethylene glycol 3350 17 Gram(s) Oral two times a day  potassium chloride    Tablet ER 20 milliEquivalent(s) Oral once  senna 2 Tablet(s) Oral at bedtime  warfarin 3 milliGRAM(s) Oral once    MEDICATIONS  (PRN):  acetaminophen   Tablet 650 milliGRAM(s) Oral every 8 hours PRN Mild Pain (1 - 3)      Allergies    No Known Allergies    Intolerances        REVIEW OF SYSTEMS:  CONSTITUTIONAL: No fever, chills, or fatigue  RESPIRATORY: No cough, wheezing; No shortness of breath  CARDIOVASCULAR: No chest pain, palpitations, dizziness, or leg swelling  GASTROINTESTINAL: No abdominal or epigastric pain. No nausea, vomiting; No diarrhea or constipation.   GENITOURINARY: No dysuria, frequency, + incontinence  NEUROLOGICAL: No headaches, memory loss, loss of strength   SKIN: No itching, burning, rashes  MUSCULOSKELETAL: No joint pain or swelling; + R heel pain    Vital Signs Last 24 Hrs  T(C): 36.4 (30 Jul 2018 04:29), Max: 36.6 (29 Jul 2018 13:30)  T(F): 97.5 (30 Jul 2018 04:29), Max: 97.9 (29 Jul 2018 13:30)  HR: 75 (30 Jul 2018 04:29) (71 - 80)  BP: 137/73 (30 Jul 2018 04:29) (92/60 - 157/85)  BP(mean): --  RR: 16 (30 Jul 2018 04:29) (16 - 17)  SpO2: 95% (30 Jul 2018 04:29) (94% - 95%)    PHYSICAL EXAM:  GENERAL: No acute distress, well-groomed, well-developed  HEAD:  Atraumatic, Normocephalic  EYES: EOMI, PERRL, conjunctiva and sclera clear  NERVOUS SYSTEM:  Alert & Oriented X3, Good concentration;   CHEST/LUNG: Clear to auscultation bilaterally; No rales, rhonchi, wheezing, or rubs  HEART: Regular rate and rhythm; No murmurs, rubs, or gallops  ABDOMEN: Soft, Nontender, Nondistended; Bowel sounds present  EXTREMITIES:  No clubbing, cyanosis, or edema  SKIN: No rashes; + R heel ulcer measuring 1cm, minimal erythema      LABS:                        9.7    7.83  )-----------( 297      ( 30 Jul 2018 10:01 )             31.3     30 Jul 2018 10:01    143    |  110    |  34     ----------------------------<  111    3.9     |  28     |  1.20     Ca    8.6        30 Jul 2018 10:01      PT/INR - ( 30 Jul 2018 10:01 )   PT: 16.4 sec;   INR: 1.49 ratio         PTT - ( 30 Jul 2018 10:01 )  PTT:33.9 sec  CAPILLARY BLOOD GLUCOSE        BLOOD CULTURE    RADIOLOGY & ADDITIONAL TESTS:    Imaging Personally Reviewed:  [ ] YES     Consultant(s) Notes Reviewed:      Care Discussed with Consultants/Other Providers: Patient is a 83y old  Female who presents with a chief complaint of fever, x 1 week, weakness (23 Jul 2018 10:18)      INTERVAL HPI/OVERNIGHT EVENTS: c/o right heel ulcer. Had before and healed but again noticed recently     MEDICATIONS  (STANDING):  aspirin enteric coated 81 milliGRAM(s) Oral daily  atorvastatin 40 milliGRAM(s) Oral at bedtime  diltiazem    Tablet 30 milliGRAM(s) Oral every 6 hours  docusate sodium 100 milliGRAM(s) Oral three times a day  enoxaparin Injectable 50 milliGRAM(s) SubCutaneous daily  ertapenem  IVPB      ertapenem  IVPB 500 milliGRAM(s) IV Intermittent every 24 hours  gabapentin 100 milliGRAM(s) Oral three times a day  glycerin Suppository - Adult 1 Suppository(s) Rectal daily  hydrALAZINE 50 milliGRAM(s) Oral three times a day  metoprolol tartrate 25 milliGRAM(s) Oral every 6 hours  mirtazapine 7.5 milliGRAM(s) Oral at bedtime  pantoprazole    Tablet 40 milliGRAM(s) Oral before breakfast  polyethylene glycol 3350 17 Gram(s) Oral two times a day  potassium chloride    Tablet ER 20 milliEquivalent(s) Oral once  senna 2 Tablet(s) Oral at bedtime  warfarin 3 milliGRAM(s) Oral once    MEDICATIONS  (PRN):  acetaminophen   Tablet 650 milliGRAM(s) Oral every 8 hours PRN Mild Pain (1 - 3)      Allergies    No Known Allergies    Intolerances        REVIEW OF SYSTEMS:  CONSTITUTIONAL: No fever, chills, or fatigue  RESPIRATORY: No cough, wheezing; No shortness of breath  CARDIOVASCULAR: No chest pain, palpitations, dizziness, or leg swelling  GASTROINTESTINAL: No abdominal or epigastric pain. No nausea, vomiting; No diarrhea or constipation.   GENITOURINARY: No dysuria, frequency, + incontinence  NEUROLOGICAL: No headaches, memory loss, loss of strength   SKIN: No itching, burning, rashes  MUSCULOSKELETAL: No joint pain or swelling; + R heel pain    Vital Signs Last 24 Hrs  T(C): 36.4 (30 Jul 2018 04:29), Max: 36.6 (29 Jul 2018 13:30)  T(F): 97.5 (30 Jul 2018 04:29), Max: 97.9 (29 Jul 2018 13:30)  HR: 75 (30 Jul 2018 04:29) (71 - 80)  BP: 137/73 (30 Jul 2018 04:29) (92/60 - 157/85)  BP(mean): --  RR: 16 (30 Jul 2018 04:29) (16 - 17)  SpO2: 95% (30 Jul 2018 04:29) (94% - 95%)    PHYSICAL EXAM:  GENERAL: No acute distress, well-groomed, well-developed  HEAD:  Atraumatic, Normocephalic  EYES: EOMI, PERRL, conjunctiva and sclera clear  NERVOUS SYSTEM:  Alert & Oriented X3, Good concentration;   CHEST/LUNG: Clear to auscultation bilaterally; No rales, rhonchi, wheezing, or rubs  HEART: Regular rate and rhythm; No murmurs, rubs, or gallops  ABDOMEN: Soft, Nontender, Nondistended; Bowel sounds present  EXTREMITIES:  No clubbing, cyanosis, or edema  SKIN: No rashes; + R heel ulcer measuring 1cm, no  erythema      LABS:                        9.7    7.83  )-----------( 297      ( 30 Jul 2018 10:01 )             31.3     30 Jul 2018 10:01    143    |  110    |  34     ----------------------------<  111    3.9     |  28     |  1.20     Ca    8.6        30 Jul 2018 10:01      PT/INR - ( 30 Jul 2018 10:01 )   PT: 16.4 sec;   INR: 1.49 ratio         PTT - ( 30 Jul 2018 10:01 )  PTT:33.9 sec  CAPILLARY BLOOD GLUCOSE        BLOOD CULTURE    RADIOLOGY & ADDITIONAL TESTS:    Imaging Personally Reviewed:  [ ] YES     Consultant(s) Notes Reviewed:      Care Discussed with Consultants/Other Providers:

## 2018-07-30 NOTE — PROGRESS NOTE ADULT - SUBJECTIVE AND OBJECTIVE BOX
[facility] -- Dahiana Bañuelos, Toney Horner Pannella, Patel, Savella  Office # [phone]    Follow Up:      Subjective/Observations:   Sitting on the chair on RA.  Denies CP, SOB, or palpitations.  Denies nausea, vomiting, abdominal pain.  Denies dysuria.    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  Osteoarthritis  Afib  PVD (peripheral vascular disease)  Neuropathy: (Right lower leg)  H/O cerebral aneurysm repair: brain clips  CVA (cerebral vascular accident): (&quot;Mini-stroke&quot;,1990&#x27;s)  Rheumatoid arthritis  Hyperlipidemia  Hypertension  S/P cataract surgery: (Left eye)  Elective surgery: (&quot;Twisted bowel&quot;, 2014)  Elective surgery: (Exision of cyst on liver, 1985)  S/P ORIF (open reduction internal fixation) fracture: Left hip fx (2012) &amp; R hip fx (2013)  H/O cerebral aneurysm repair: Brain clips (1978(  Renal stone: Cysto stent placement 10/1/2014  PVD (peripheral vascular disease): s/p RLE bypass x 3, most recent 3/2012 Right external iliac to PT bypass w/ PTFE (2012)  S/P FRED (total abdominal hysterectomy): (1987, Hx of &quot;ovarian cancer?&quot;)    MEDICATIONS  (STANDING):  aspirin enteric coated 81 milliGRAM(s) Oral daily  atorvastatin 40 milliGRAM(s) Oral at bedtime  diltiazem    Tablet 30 milliGRAM(s) Oral every 6 hours  docusate sodium 100 milliGRAM(s) Oral three times a day  enoxaparin Injectable 50 milliGRAM(s) SubCutaneous daily  ertapenem  IVPB      ertapenem  IVPB 500 milliGRAM(s) IV Intermittent every 24 hours  gabapentin 100 milliGRAM(s) Oral three times a day  glycerin Suppository - Adult 1 Suppository(s) Rectal daily  hydrALAZINE 50 milliGRAM(s) Oral three times a day  metoprolol tartrate 25 milliGRAM(s) Oral every 6 hours  mirtazapine 7.5 milliGRAM(s) Oral at bedtime  pantoprazole    Tablet 40 milliGRAM(s) Oral before breakfast  polyethylene glycol 3350 17 Gram(s) Oral two times a day  senna 2 Tablet(s) Oral at bedtime    MEDICATIONS  (PRN):  acetaminophen   Tablet 650 milliGRAM(s) Oral every 8 hours PRN Mild Pain (1 - 3)    Allergies    No Known Allergies    Intolerances    Vital Signs Last 24 Hrs  T(C): 36.4 (30 Jul 2018 04:29), Max: 36.6 (29 Jul 2018 13:30)  T(F): 97.5 (30 Jul 2018 04:29), Max: 97.9 (29 Jul 2018 13:30)  HR: 75 (30 Jul 2018 04:29) (71 - 80)  BP: 137/73 (30 Jul 2018 04:29) (92/60 - 157/85)  BP(mean): --  RR: 16 (30 Jul 2018 04:29) (16 - 17)  SpO2: 95% (30 Jul 2018 04:29) (94% - 95%)    I&O's Summary    29 Jul 2018 07:01  -  30 Jul 2018 07:00  --------------------------------------------------------  IN: 50 mL / OUT: 0 mL / NET: 50 mL    PHYSICAL EXAM:  TELE: Not on tele  Constitutional: NAD, awake and alert, well-developed  HEENT: Moist Mucous Membranes, Anicteric  Pulmonary: Non-labored, breath sounds are clear bilaterally, No wheezing, rales or rhonchi  Cardiovascular: Irregularly irregular, S1 and S2, No murmurs, rubs, gallops or clicks  Gastrointestinal: Bowel Sounds present, soft, nontender.   Lymph: No peripheral edema. No lymphadenopathy.  Skin: No visible rashes or ulcers.  Psych:  Mood & affect appropriate    LABS: All Labs Reviewed:                        9.7    7.83  )-----------( 297      ( 30 Jul 2018 10:01 )             31.3                         8.5    7.54  )-----------( 268      ( 28 Jul 2018 06:18 )             28.7                         9.2    x     )-----------( x        ( 27 Jul 2018 16:03 )             29.6     30 Jul 2018 10:01    143    |  110    |  34     ----------------------------<  111    3.9     |  28     |  1.20   28 Jul 2018 06:18    146    |  113    |  49     ----------------------------<  93     4.3     |  27     |  1.40     Ca    8.6        30 Jul 2018 10:01  Ca    8.1        28 Jul 2018 06:18    TPro  5.9    /  Alb  2.6    /  TBili  0.3    /  DBili  .10    /  AST  24     /  ALT  37     /  AlkPhos  213    28 Jul 2018 06:18    PT/INR - ( 30 Jul 2018 10:01 )   PT: 16.4 sec;   INR: 1.49 ratio      PTT - ( 30 Jul 2018 10:01 )  PTT:33.9 sec    < from: CT Abdomen and Pelvis w/ IV Cont (07.21.18 @ 00:34) >    EXAM:  CT ABDOMEN AND PELVIS IC                            PROCEDURE DATE:  07/21/2018          INTERPRETATION:  CLINICAL INFORMATION: Fever and diarrhea    COMPARISON: CT abdomen and pelvis 10/31/2017    PROCEDURE:   CT of the Abdomen and Pelvis was performed with intravenous contrast.   Intravenous contrast: 96 ml Omnipaque 350. 4 ml discarded.  Oral contrast: positive contrast was administered.  Sagittal and coronal reformats were performed.    FINDINGS:    LOWER CHEST: Within normal limits.    LIVER: Within normal limits.  BILE DUCTS: Pneumobilia. Mild intrahepatic bile duct dilatation. Common   bile duct stent is seen with distal tip in the duodenal lumen..  GALLBLADDER: Distended gallbladder containing stones.  SPLEEN: Within normal limits.  PANCREAS: Within normal limits.  ADRENALS: 1.4 cm indeterminate right lateral.  KIDNEYS/URETERS: Mild bilateral renal atrophy. Right renal cyst.   Prominent bilateral extrarenal pelves .    BLADDER: Within normal limits.  REPRODUCTIVE ORGANS: Status post hysterectomy.     BOWEL: No bowel obstruction. Appendix normal. Right lower quadrant small   bowel anastomosis seen. Diverticulosis of the descending and sigmoid   colon.  PERITONEUM: No ascites.  VESSELS:  Stable infrarenal abdominal aortic aneurysm measures 3.7 x 2.7   cm. Redemonstration of 3 occluded bypass graft extending from the right   external iliac artery down the leg.  RETROPERITONEUM: No lymphadenopathy.    ABDOMINAL WALL: Within normal limits.  BONES: Degenerative changes of the spine. Bilateral femoral rods and   dynamic hip screws..    IMPRESSION: Distended gallbladder containing stones. Mild intrahepatic   bile duct dilatation with a common bile duct stent in place.    DWAYNE PARNELL M.D., ATTENDING RADIOLOGIST  This document has been electronically signed. Jul 21 2018  1:28AM      < end of copied text >    < from: TTE Echo Doppler w/o Cont (02.05.18 @ 08:11) >     EXAM:  ECHO TTE W/O CON COMP W/DOPPLR         PROCEDURE DATE:  02/05/2018        INTERPRETATION:  INDICATION: respiratory failure  Referring M.D.:Lona  Blood Pressure 126/59        Weight (kg) :49     Height (cm):157       BSA (sq m): 1.47  Technician: JOJO    Dimensions:    LA 3.4       Normal Values: 2.0 - 4.0 cm    Ao 3.0        Normal Values: 2.0 - 3.8 cm  SEPTUM 0.8       Normal Values: 0.6 - 1.2 cm  PWT 0.8       Normal Values: 0.6 - 1.1 cm  LVIDd 3.4         Normal Values: 3.0 - 5.6 cm  LVIDs 2.3         Normal Values: 1.8 - 4.0 cm      OBSERVATIONS:  Technically difficult bedside ICU study. The patient is vented  Mitral Valve: Calcified mitral annulus and mitral valve leaflets with   normal opening. Mild mitral regurgitation.  Aortic Valve/Aorta: Calcified trileaflet aortic valve with normal opening.  Tricuspid Valve: Calcified trileaflet cusp at annulus with normally   opening valve leaflets. Mild tricuspid regurgitation.  Pulmonic Valve: The pulmonic valve is not well visualized. Probably   normal. Mild PI  Left Atrium: Small to moderate left atrial enlargement  Right Atrium: Normal  Left Ventricle: The endocardium is not well-visualized. There appears to   be grossly preserved left ventricular systolic function. There appears to   be concentric left ventricular hypertrophy. The EF is approximately 65%.  Right Ventricle: Normal right ventricular size and function. There is a   device wire noted in the right heart  Pericardium/Pleura: No pericardial effusion noted. Left pleural effusion   noted  Pulmonary/RV Pressure: The right ventricular systolic pressure is   estimated to be 41mmHg, assuming that the right atrial pressure is   estimated to be 8 mmHg. This is consistent with mild pulmonary   hypertension.  LV Diastolic Function: Stage II diastolic dysfunction    Conclusion: Likely difficult and limited study. Grossly preserved left   ventricular systolic function. Sensation diastolic dysfunction. Left   pleural effusion is noted.      KIET NIETO M.D., ATTENDING CARDIOLOGIST  This document has been electronically signed. Feb 6 2018  7:49PM     < end of copied text > [facility] -- Dahiana Bañuelos, Toney Horner Pannella, Patel, Savella  Office # [phone]    Follow Up:  AF    Subjective/Observations:   Sitting on the chair on RA.  Denies CP, SOB, or palpitations.  Denies nausea, vomiting, abdominal pain.  Denies dysuria.    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  Osteoarthritis  Afib  PVD (peripheral vascular disease)  Neuropathy: (Right lower leg)  H/O cerebral aneurysm repair: brain clips  CVA (cerebral vascular accident): (&quot;Mini-stroke&quot;,1990&#x27;s)  Rheumatoid arthritis  Hyperlipidemia  Hypertension  S/P cataract surgery: (Left eye)  Elective surgery: (&quot;Twisted bowel&quot;, 2014)  Elective surgery: (Exision of cyst on liver, 1985)  S/P ORIF (open reduction internal fixation) fracture: Left hip fx (2012) &amp; R hip fx (2013)  H/O cerebral aneurysm repair: Brain clips (1978(  Renal stone: Cysto stent placement 10/1/2014  PVD (peripheral vascular disease): s/p RLE bypass x 3, most recent 3/2012 Right external iliac to PT bypass w/ PTFE (2012)  S/P FRED (total abdominal hysterectomy): (1987, Hx of &quot;ovarian cancer?&quot;)    MEDICATIONS  (STANDING):  aspirin enteric coated 81 milliGRAM(s) Oral daily  atorvastatin 40 milliGRAM(s) Oral at bedtime  diltiazem    Tablet 30 milliGRAM(s) Oral every 6 hours  docusate sodium 100 milliGRAM(s) Oral three times a day  enoxaparin Injectable 50 milliGRAM(s) SubCutaneous daily  ertapenem  IVPB      ertapenem  IVPB 500 milliGRAM(s) IV Intermittent every 24 hours  gabapentin 100 milliGRAM(s) Oral three times a day  glycerin Suppository - Adult 1 Suppository(s) Rectal daily  hydrALAZINE 50 milliGRAM(s) Oral three times a day  metoprolol tartrate 25 milliGRAM(s) Oral every 6 hours  mirtazapine 7.5 milliGRAM(s) Oral at bedtime  pantoprazole    Tablet 40 milliGRAM(s) Oral before breakfast  polyethylene glycol 3350 17 Gram(s) Oral two times a day  senna 2 Tablet(s) Oral at bedtime    MEDICATIONS  (PRN):  acetaminophen   Tablet 650 milliGRAM(s) Oral every 8 hours PRN Mild Pain (1 - 3)    Allergies    No Known Allergies    Intolerances    Vital Signs Last 24 Hrs  T(C): 36.4 (30 Jul 2018 04:29), Max: 36.6 (29 Jul 2018 13:30)  T(F): 97.5 (30 Jul 2018 04:29), Max: 97.9 (29 Jul 2018 13:30)  HR: 75 (30 Jul 2018 04:29) (71 - 80)  BP: 137/73 (30 Jul 2018 04:29) (92/60 - 157/85)  BP(mean): --  RR: 16 (30 Jul 2018 04:29) (16 - 17)  SpO2: 95% (30 Jul 2018 04:29) (94% - 95%)    I&O's Summary    29 Jul 2018 07:01  -  30 Jul 2018 07:00  --------------------------------------------------------  IN: 50 mL / OUT: 0 mL / NET: 50 mL    PHYSICAL EXAM:  TELE: Not on tele  Constitutional: NAD, awake and alert, well-developed  HEENT: Moist Mucous Membranes, Anicteric  Pulmonary: Non-labored, breath sounds are clear bilaterally, No wheezing, rales or rhonchi  Cardiovascular: Irregularly irregular, S1 and S2, No murmurs, rubs, gallops or clicks  Gastrointestinal: Bowel Sounds present, soft, nontender.   Lymph: No peripheral edema. No lymphadenopathy.  Skin: No visible rashes or ulcers.  Psych:  Mood & affect appropriate    LABS: All Labs Reviewed:                        9.7    7.83  )-----------( 297      ( 30 Jul 2018 10:01 )             31.3                         8.5    7.54  )-----------( 268      ( 28 Jul 2018 06:18 )             28.7                         9.2    x     )-----------( x        ( 27 Jul 2018 16:03 )             29.6     30 Jul 2018 10:01    143    |  110    |  34     ----------------------------<  111    3.9     |  28     |  1.20   28 Jul 2018 06:18    146    |  113    |  49     ----------------------------<  93     4.3     |  27     |  1.40     Ca    8.6        30 Jul 2018 10:01  Ca    8.1        28 Jul 2018 06:18    TPro  5.9    /  Alb  2.6    /  TBili  0.3    /  DBili  .10    /  AST  24     /  ALT  37     /  AlkPhos  213    28 Jul 2018 06:18    PT/INR - ( 30 Jul 2018 10:01 )   PT: 16.4 sec;   INR: 1.49 ratio      PTT - ( 30 Jul 2018 10:01 )  PTT:33.9 sec    < from: CT Abdomen and Pelvis w/ IV Cont (07.21.18 @ 00:34) >    EXAM:  CT ABDOMEN AND PELVIS IC                            PROCEDURE DATE:  07/21/2018          INTERPRETATION:  CLINICAL INFORMATION: Fever and diarrhea    COMPARISON: CT abdomen and pelvis 10/31/2017    PROCEDURE:   CT of the Abdomen and Pelvis was performed with intravenous contrast.   Intravenous contrast: 96 ml Omnipaque 350. 4 ml discarded.  Oral contrast: positive contrast was administered.  Sagittal and coronal reformats were performed.    FINDINGS:    LOWER CHEST: Within normal limits.    LIVER: Within normal limits.  BILE DUCTS: Pneumobilia. Mild intrahepatic bile duct dilatation. Common   bile duct stent is seen with distal tip in the duodenal lumen..  GALLBLADDER: Distended gallbladder containing stones.  SPLEEN: Within normal limits.  PANCREAS: Within normal limits.  ADRENALS: 1.4 cm indeterminate right lateral.  KIDNEYS/URETERS: Mild bilateral renal atrophy. Right renal cyst.   Prominent bilateral extrarenal pelves .    BLADDER: Within normal limits.  REPRODUCTIVE ORGANS: Status post hysterectomy.     BOWEL: No bowel obstruction. Appendix normal. Right lower quadrant small   bowel anastomosis seen. Diverticulosis of the descending and sigmoid   colon.  PERITONEUM: No ascites.  VESSELS:  Stable infrarenal abdominal aortic aneurysm measures 3.7 x 2.7   cm. Redemonstration of 3 occluded bypass graft extending from the right   external iliac artery down the leg.  RETROPERITONEUM: No lymphadenopathy.    ABDOMINAL WALL: Within normal limits.  BONES: Degenerative changes of the spine. Bilateral femoral rods and   dynamic hip screws..    IMPRESSION: Distended gallbladder containing stones. Mild intrahepatic   bile duct dilatation with a common bile duct stent in place.    DWAYNE PARNELL M.D., ATTENDING RADIOLOGIST  This document has been electronically signed. Jul 21 2018  1:28AM      < end of copied text >    < from: TTE Echo Doppler w/o Cont (02.05.18 @ 08:11) >     EXAM:  ECHO TTE W/O CON COMP W/DOPPLR         PROCEDURE DATE:  02/05/2018        INTERPRETATION:  INDICATION: respiratory failure  Referring M.D.:Lona  Blood Pressure 126/59        Weight (kg) :49     Height (cm):157       BSA (sq m): 1.47  Technician: JOJO    Dimensions:    LA 3.4       Normal Values: 2.0 - 4.0 cm    Ao 3.0        Normal Values: 2.0 - 3.8 cm  SEPTUM 0.8       Normal Values: 0.6 - 1.2 cm  PWT 0.8       Normal Values: 0.6 - 1.1 cm  LVIDd 3.4         Normal Values: 3.0 - 5.6 cm  LVIDs 2.3         Normal Values: 1.8 - 4.0 cm      OBSERVATIONS:  Technically difficult bedside ICU study. The patient is vented  Mitral Valve: Calcified mitral annulus and mitral valve leaflets with   normal opening. Mild mitral regurgitation.  Aortic Valve/Aorta: Calcified trileaflet aortic valve with normal opening.  Tricuspid Valve: Calcified trileaflet cusp at annulus with normally   opening valve leaflets. Mild tricuspid regurgitation.  Pulmonic Valve: The pulmonic valve is not well visualized. Probably   normal. Mild PI  Left Atrium: Small to moderate left atrial enlargement  Right Atrium: Normal  Left Ventricle: The endocardium is not well-visualized. There appears to   be grossly preserved left ventricular systolic function. There appears to   be concentric left ventricular hypertrophy. The EF is approximately 65%.  Right Ventricle: Normal right ventricular size and function. There is a   device wire noted in the right heart  Pericardium/Pleura: No pericardial effusion noted. Left pleural effusion   noted  Pulmonary/RV Pressure: The right ventricular systolic pressure is   estimated to be 41mmHg, assuming that the right atrial pressure is   estimated to be 8 mmHg. This is consistent with mild pulmonary   hypertension.  LV Diastolic Function: Stage II diastolic dysfunction    Conclusion: Likely difficult and limited study. Grossly preserved left   ventricular systolic function. Sensation diastolic dysfunction. Left   pleural effusion is noted.      KIET NIETO M.D., ATTENDING CARDIOLOGIST  This document has been electronically signed. Feb 6 2018  7:49PM     < end of copied text >

## 2018-07-30 NOTE — PROGRESS NOTE ADULT - PROBLEM SELECTOR PLAN 7
- continue aspirin, neurontinin  - per vascular no limb threatening ischemia at this time.   - continue coumadin.   - may require amputation in the future. - continue aspirin, neurontinin  - per vascular no limb threatening ischemia at this time.   - continue coumadin.   - Will f/u with her vascular as out patient

## 2018-07-30 NOTE — PROGRESS NOTE ADULT - SUBJECTIVE AND OBJECTIVE BOX
INTERVAL HPI/OVERNIGHT EVENTS:  pt seen and examined  denies n/v/abd pain +bm yesterday, c/o toe pain- rn aware  no new labs to assess, afebrile overnight    MEDICATIONS  (STANDING):  aspirin enteric coated 81 milliGRAM(s) Oral daily  atorvastatin 40 milliGRAM(s) Oral at bedtime  diltiazem    Tablet 30 milliGRAM(s) Oral every 6 hours  docusate sodium 100 milliGRAM(s) Oral three times a day  enoxaparin Injectable 50 milliGRAM(s) SubCutaneous daily  ertapenem  IVPB      ertapenem  IVPB 500 milliGRAM(s) IV Intermittent every 24 hours  gabapentin 100 milliGRAM(s) Oral three times a day  glycerin Suppository - Adult 1 Suppository(s) Rectal daily  hydrALAZINE 50 milliGRAM(s) Oral three times a day  metoprolol tartrate 25 milliGRAM(s) Oral every 6 hours  mirtazapine 7.5 milliGRAM(s) Oral at bedtime  pantoprazole    Tablet 40 milliGRAM(s) Oral before breakfast  polyethylene glycol 3350 17 Gram(s) Oral two times a day  senna 2 Tablet(s) Oral at bedtime    MEDICATIONS  (PRN):  acetaminophen   Tablet 650 milliGRAM(s) Oral every 8 hours PRN Mild Pain (1 - 3)      Allergies    No Known Allergies    Intolerances        Review of Systems:    General:  No wt loss, fevers, chills, night sweats, fatigue   Eyes:  Good vision, no reported pain  ENT:  No sore throat, pain, runny nose, dysphagia  CV:  No pain, palpitations, hypo/hypertension  Resp:  No dyspnea, cough, tachypnea, wheezing  GI:  No pain, No nausea, No vomiting, No diarrhea, No constipation, No weight loss, No fever, No pruritis, No rectal bleeding, No melena, No dysphagia  :  No pain, bleeding, incontinence, nocturia  Muscle: toe pain  Neuro:  No weakness, tingling, memory problems  Psych:  No fatigue, insomnia, mood problems, depression  Endocrine:  No polyuria, polydypsia, cold/heat intolerance  Heme:  No petechiae, ecchymosis, easy bruisability  Skin:  No rash, tattoos, scars, edema      Vital Signs Last 24 Hrs  T(C): 36.4 (30 Jul 2018 04:29), Max: 36.6 (29 Jul 2018 13:30)  T(F): 97.5 (30 Jul 2018 04:29), Max: 97.9 (29 Jul 2018 13:30)  HR: 75 (30 Jul 2018 04:29) (71 - 80)  BP: 137/73 (30 Jul 2018 04:29) (92/60 - 157/85)  BP(mean): --  RR: 16 (30 Jul 2018 04:29) (16 - 17)  SpO2: 95% (30 Jul 2018 04:29) (94% - 95%)    PHYSICAL EXAM:  Constitutional: NAD, lying in bed  HEENT: ncat  Neck: No LAD  Respiratory: dec bs  Cardiovascular: S1 and S2, RRR  Gastrointestinal: BS+, soft,  nt, nd  Extremities: No peripheral edema  Vascular: 2+ peripheral pulses  Neurological: Awake alert responds appropriately  Skin: No rashes      LABS:                RADIOLOGY & ADDITIONAL TESTS:

## 2018-07-30 NOTE — PROGRESS NOTE ADULT - ASSESSMENT
82 yo female w/ PMH of afib on coumadin, CVA and hx of aneurysm, HTN, HLD, mitral valve prolapse, PVD s/p stents x 3 (RLE), rheumatoid arthritis, who presents prior to admission with one week of fever, diaphoresis,  chills, weakness, and lethargy admitted with sepsis sec GNR bacteremia and ecoli/klebsiella complicated UTI; 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     Podiatry consulted for R heel ulcer appreciate recommendations 84 yo female w/ PMH of afib on coumadin, CVA and hx of aneurysm, HTN, HLD, mitral valve prolapse, PVD s/p stents x 3 (RLE), rheumatoid arthritis, who presents prior to admission with one week of fever, diaphoresis,  chills, weakness, and lethargy admitted with sepsis sec GNR bacteremia and ecoli/klebsiella complicated UTI; 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     Podiatry (Dr. Temple) consulted for R heel ulcer appreciate recommendations

## 2018-07-30 NOTE — PROGRESS NOTE ADULT - PROBLEM SELECTOR PLAN 1
pt w hx of ercp w stent placement and biliary sepsis  US showed choledocholithiasis and cholelithiasis  sp ercp 7/25- occluded stent removed, multiple stones extracted  surgery eval appreciated, pt/family refusing lap rox at present  cont abx per id  repeat bld cxs ngtd  f/u ID recs  trend labs

## 2018-07-30 NOTE — PROGRESS NOTE ADULT - PROBLEM SELECTOR PLAN 4
- HIDA negative , h/o biliary stent due to cbd stone   - lfts trending normal. - consulted Dr. Temple (podiatry), appreciate recs

## 2018-07-30 NOTE — PROGRESS NOTE ADULT - ASSESSMENT
82 yo female w/ PMH of PAF on AC, CVA and hx of aneurysm repair, HTN, HLD, mitral valve prolapse, PAD, RA, HFPEF p/w urosepsis    - HIDA with no evidence of acute rox.  s/p ERCP.  s/p multiple stones removal and extraction of occluded stent.  Tolerated procedure well with no cardiac complications  - Last echo 2/2018 with grossly normal LV systolic function and Diastolic dysfunction grade 2.  No need to repeat echo at this time.  - ECG with no evidence of ischemia.    - Monitor and replete electrolytes as necessary. Keep K>4.0 and Mg>2.0.   - HTN better controlled.  Continue Hydralazine.  - h/p PAF, rate controlled.  Switch Lopressor to Toprol and Cardizem to long-acting.  - Dose Coumadin. Goal INR 2-3. Needs Daily INRs.   Yesterday INR 1.82 subtherapeutic.  Today pending.  - s/p PICC.  ABX per ID  - cont Aspirin   - Monitor H/H and for clinical signs of bleeding.   - PAD: currently disease is severe and not amenable to revascularization.   - Please continue to maintain strict I/Os, monitor daily weights, Cr.  Avoid nephrotoxic agents.    - Further cardiac workup will depend on clinical course.   - All other workup per primary team.  - Will cont to follow    Isabel Quintero NP  Cardiology 84 yo female w/ PMH of PAF on AC, CVA and hx of aneurysm repair, HTN, HLD, mitral valve prolapse, PAD, RA, HFPEF p/w urosepsis    - HIDA with no evidence of acute rox.  s/p ERCP.  s/p multiple stones removal and extraction of occluded stent.  Tolerated procedure well with no cardiac complications  - Last echo 2/2018 with grossly normal LV systolic function and Diastolic dysfunction grade 2.  No need to repeat echo at this time.  - ECG with no evidence of ischemia.    - Monitor and replete electrolytes as necessary. Keep K>4.0 and Mg>2.0.   - HTN better controlled.  Continue Hydralazine.  - h/p PAF, rate controlled.  Switch Lopressor to Toprol and Cardizem to long-acting.  - Dose Coumadin. Goal INR 2-3. Needs Daily INRs.   Yesterday INR 1.82 subtherapeutic.  Today's=1.49.  - s/p PICC.  ABX per ID  - cont Aspirin   - Monitor H/H and for clinical signs of bleeding.   - PAD: currently disease is severe and not amenable to revascularization.   - Please continue to maintain strict I/Os, monitor daily weights, Cr.  Avoid nephrotoxic agents.    - Further cardiac workup will depend on clinical course.   - All other workup per primary team.  - Will cont to follow    Isabel Quintero NP  Cardiology

## 2018-07-30 NOTE — PROGRESS NOTE ADULT - PROBLEM SELECTOR PLAN 1
ESBL bacteremia  on invanz for 2 more days, ends wed 8/1 then can dc home; check lfts  afebrile , no leukocytosis.   repeat blood culture ngtd   ID Dr. armendariz following. will f/u recs for abx course. continue invanz x 10d, f/u repeat blood cx, PICC today, will likely need rehab for iv antibx as pt unable to complete antibx at home apprec sw/cm recs

## 2018-07-31 DIAGNOSIS — E87.0 HYPEROSMOLALITY AND HYPERNATREMIA: ICD-10-CM

## 2018-07-31 LAB
ANION GAP SERPL CALC-SCNC: 7 MMOL/L — SIGNIFICANT CHANGE UP (ref 5–17)
APTT BLD: 34 SEC — SIGNIFICANT CHANGE UP (ref 27.5–37.4)
BUN SERPL-MCNC: 37 MG/DL — HIGH (ref 7–23)
CALCIUM SERPL-MCNC: 8.4 MG/DL — LOW (ref 8.5–10.1)
CHLORIDE SERPL-SCNC: 112 MMOL/L — HIGH (ref 96–108)
CO2 SERPL-SCNC: 28 MMOL/L — SIGNIFICANT CHANGE UP (ref 22–31)
CREAT SERPL-MCNC: 1.2 MG/DL — SIGNIFICANT CHANGE UP (ref 0.5–1.3)
GLUCOSE SERPL-MCNC: 85 MG/DL — SIGNIFICANT CHANGE UP (ref 70–99)
HCT VFR BLD CALC: 30.1 % — LOW (ref 34.5–45)
HGB BLD-MCNC: 9.1 G/DL — LOW (ref 11.5–15.5)
INR BLD: 1.3 RATIO — HIGH (ref 0.88–1.16)
MCHC RBC-ENTMCNC: 25.6 PG — LOW (ref 27–34)
MCHC RBC-ENTMCNC: 30.2 GM/DL — LOW (ref 32–36)
MCV RBC AUTO: 84.8 FL — SIGNIFICANT CHANGE UP (ref 80–100)
NRBC # BLD: 0 /100 WBCS — SIGNIFICANT CHANGE UP (ref 0–0)
PLATELET # BLD AUTO: 254 K/UL — SIGNIFICANT CHANGE UP (ref 150–400)
POTASSIUM SERPL-MCNC: 4.3 MMOL/L — SIGNIFICANT CHANGE UP (ref 3.5–5.3)
POTASSIUM SERPL-SCNC: 4.3 MMOL/L — SIGNIFICANT CHANGE UP (ref 3.5–5.3)
PROTHROM AB SERPL-ACNC: 14.3 SEC — HIGH (ref 9.8–12.7)
RBC # BLD: 3.55 M/UL — LOW (ref 3.8–5.2)
RBC # FLD: 17.7 % — HIGH (ref 10.3–14.5)
SODIUM SERPL-SCNC: 147 MMOL/L — HIGH (ref 135–145)
WBC # BLD: 6.66 K/UL — SIGNIFICANT CHANGE UP (ref 3.8–10.5)
WBC # FLD AUTO: 6.66 K/UL — SIGNIFICANT CHANGE UP (ref 3.8–10.5)

## 2018-07-31 PROCEDURE — 99221 1ST HOSP IP/OBS SF/LOW 40: CPT

## 2018-07-31 PROCEDURE — 99232 SBSQ HOSP IP/OBS MODERATE 35: CPT

## 2018-07-31 PROCEDURE — 99233 SBSQ HOSP IP/OBS HIGH 50: CPT

## 2018-07-31 RX ORDER — WARFARIN SODIUM 2.5 MG/1
6 TABLET ORAL ONCE
Qty: 0 | Refills: 0 | Status: COMPLETED | OUTPATIENT
Start: 2018-07-31 | End: 2018-07-31

## 2018-07-31 RX ORDER — SOD,AMMONIUM,POTASSIUM LACTATE
1 CREAM (GRAM) TOPICAL
Qty: 0 | Refills: 0 | Status: DISCONTINUED | OUTPATIENT
Start: 2018-07-31 | End: 2018-08-01

## 2018-07-31 RX ORDER — WARFARIN SODIUM 2.5 MG/1
5 TABLET ORAL ONCE
Qty: 0 | Refills: 0 | Status: DISCONTINUED | OUTPATIENT
Start: 2018-07-31 | End: 2018-07-31

## 2018-07-31 RX ADMIN — Medication 120 MILLIGRAM(S): at 05:32

## 2018-07-31 RX ADMIN — Medication 100 MILLIGRAM(S): at 13:06

## 2018-07-31 RX ADMIN — GABAPENTIN 100 MILLIGRAM(S): 400 CAPSULE ORAL at 13:04

## 2018-07-31 RX ADMIN — WARFARIN SODIUM 6 MILLIGRAM(S): 2.5 TABLET ORAL at 22:47

## 2018-07-31 RX ADMIN — POLYETHYLENE GLYCOL 3350 17 GRAM(S): 17 POWDER, FOR SOLUTION ORAL at 05:33

## 2018-07-31 RX ADMIN — GABAPENTIN 100 MILLIGRAM(S): 400 CAPSULE ORAL at 22:47

## 2018-07-31 RX ADMIN — ATORVASTATIN CALCIUM 40 MILLIGRAM(S): 80 TABLET, FILM COATED ORAL at 22:47

## 2018-07-31 RX ADMIN — MIRTAZAPINE 7.5 MILLIGRAM(S): 45 TABLET, ORALLY DISINTEGRATING ORAL at 22:47

## 2018-07-31 RX ADMIN — PANTOPRAZOLE SODIUM 40 MILLIGRAM(S): 20 TABLET, DELAYED RELEASE ORAL at 05:33

## 2018-07-31 RX ADMIN — Medication 50 MILLIGRAM(S): at 13:39

## 2018-07-31 RX ADMIN — Medication 81 MILLIGRAM(S): at 13:03

## 2018-07-31 RX ADMIN — Medication 100 MILLIGRAM(S): at 05:31

## 2018-07-31 RX ADMIN — ENOXAPARIN SODIUM 50 MILLIGRAM(S): 100 INJECTION SUBCUTANEOUS at 13:03

## 2018-07-31 RX ADMIN — ERTAPENEM SODIUM 100 MILLIGRAM(S): 1 INJECTION, POWDER, LYOPHILIZED, FOR SOLUTION INTRAMUSCULAR; INTRAVENOUS at 14:40

## 2018-07-31 RX ADMIN — Medication 100 MILLIGRAM(S): at 05:34

## 2018-07-31 RX ADMIN — Medication 1 APPLICATION(S): at 17:13

## 2018-07-31 RX ADMIN — Medication 50 MILLIGRAM(S): at 22:47

## 2018-07-31 RX ADMIN — GABAPENTIN 100 MILLIGRAM(S): 400 CAPSULE ORAL at 05:32

## 2018-07-31 RX ADMIN — Medication 50 MILLIGRAM(S): at 05:32

## 2018-07-31 NOTE — CONSULT NOTE ADULT - SUBJECTIVE AND OBJECTIVE BOX
Vascular Attending:  Dr. Levi      HPI:  82 yo female w/ PMH of afib on coumadin, CVA and hx of aneurysm repair, HTN, HLD, mitral valve prolapse, PVD s/p stents x 3 (RLE), rheumatoid arthritis, who presents with one week of subjective fever (measured at home, pt doesn't remember temp), diaphoresis chills, weakness, and lethargy.     The patient was in her usual state of health until one week prior to presentation when she started to feel subjective fevers, chills, and diaphoresis. She then measured her temperature on the day of admission was noted it was "high" though she couldn't remember the exact temperature. She endorsed nocturia and urinary incontinence, though she notes both the nocturia and urinary incontinence are chronic and the frequency remains unchanged from prior.  She denies dysuria, hematuria, urinary frequency, urinary hesitancy.     She denies cough, SOB, CRABTREE, chest pain, palpitations. Of note, she endorses "a few days" of diarrhea that resolved without intervention one week prior to presentation. She denies recent travel, sick contacts.         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  Osteoarthritis  Afib  PVD (peripheral vascular disease)  Neuropathy: (Right lower leg)  H/O cerebral aneurysm repair: brain clips  CVA (cerebral vascular accident): (&quot;Mini-stroke&quot;,1990&#x27;s)  Rheumatoid arthritis  Hyperlipidemia  Hypertension  S/P cataract surgery: (Left eye)  Elective surgery: (&quot;Twisted bowel&quot;, 2014)  Elective surgery: (Exision of cyst on liver, 1985)  S/P ORIF (open reduction internal fixation) fracture: Left hip fx (2012) &amp; R hip fx (2013)  H/O cerebral aneurysm repair: Brain clips (1978(  Renal stone: Cysto stent placement 10/1/2014  PVD (peripheral vascular disease): s/p RLE bypass x 3, most recent 3/2012 Right external iliac to PT bypass w/ PTFE (2012)  S/P FRED (total abdominal hysterectomy): (1987, Hx of &quot;ovarian cancer?&quot;)      MEDICATIONS  (STANDING):  ammonium lactate 12% Lotion 1 Application(s) Topical two times a day  aspirin enteric coated 81 milliGRAM(s) Oral daily  atorvastatin 40 milliGRAM(s) Oral at bedtime  diltiazem    milliGRAM(s) Oral daily  docusate sodium 100 milliGRAM(s) Oral three times a day  enoxaparin Injectable 50 milliGRAM(s) SubCutaneous daily  ertapenem  IVPB      ertapenem  IVPB 500 milliGRAM(s) IV Intermittent every 24 hours  gabapentin 100 milliGRAM(s) Oral three times a day  glycerin Suppository - Adult 1 Suppository(s) Rectal daily  hydrALAZINE 50 milliGRAM(s) Oral three times a day  metoprolol succinate  milliGRAM(s) Oral daily  mirtazapine 7.5 milliGRAM(s) Oral at bedtime  pantoprazole    Tablet 40 milliGRAM(s) Oral before breakfast  polyethylene glycol 3350 17 Gram(s) Oral two times a day  senna 2 Tablet(s) Oral at bedtime  warfarin 6 milliGRAM(s) Oral once    MEDICATIONS  (PRN):  acetaminophen   Tablet 650 milliGRAM(s) Oral every 8 hours PRN Mild Pain (1 - 3)      Allergies    No Known Allergies    Intolerances        FAMILY HISTORY:  No pertinent family history in first degree relatives      SOCIAL HISTORY:    ROS: 10-system review is otherwise negative except HPI above.      Vital Signs Last 24 Hrs  T(C): 36.8 (31 Jul 2018 13:51), Max: 37.1 (30 Jul 2018 20:05)  T(F): 98.3 (31 Jul 2018 13:51), Max: 98.7 (30 Jul 2018 20:05)  HR: 83 (31 Jul 2018 13:51) (76 - 90)  BP: 108/71 (31 Jul 2018 13:51) (103/63 - 176/89)  BP(mean): --  RR: 16 (31 Jul 2018 13:51) (16 - 16)  SpO2: 93% (31 Jul 2018 13:51) (93% - 98%)    General:  NAD  Neuro: 5/5 motor function in all 4 extremities, sensation intact; AAOX3  HEENT:  no facial droop; no ptosis or facial edema  Neck:  Supple, no carotid bruits  Respiratory: CTA B/L  CV: RRR, S1S2, no murmur  Abdominal: Soft, NT, ND no palpable mass, no pulsatile mass, no bruits  Ext: No edema, pink, well-perfused; capillary refill <2sec bilaterally  Musc: FROM all 4 extremity  Vasc:   Right    Axillary  2+ [ ]  1+ [ ] doppler [ ]                  Left   Axillary  2+ [ ]  1+ [ ] doppler [ ]               Brachial  2+ [ ]  1+ [ ] doppler [ ]                           Brachial  2+ [ ]  1+ [ ] doppler [ ]               Radial  2+ [ ]  1+ [ ] doppler [ ]                              Radial 2+ [ ]  1+ [ ] doppler [ ]               Ulnar  2+ [ ]  1+ [ ] doppler [ ]                               Ulnar  2+ [ ]  1+ [ ] doppler [ ]               Femoral  2+ [ ]  1+ [ ] doppler [ ]                          Femoral  2+ [ ]  1+ [ ] doppler [ ]               Popliteal  2+ [ ]  1+ [ ] doppler [ ]                         Popliteal  2+ [ ]  1+ [ ] doppler [ ]               Dorsalis Pedis  2+ [ ]  1+ [ ] doppler [ ]                Dorsalis Pedis  2+ [ ]  1+ [ ] doppler [ ]               Posterior Tibial  2+ [ ]  1+ [ ] doppler [ ]              Posterior Tibial  2+ [ ]  1+ [ ] doppler [ ]      LABS:                        9.1    6.66  )-----------( 254      ( 31 Jul 2018 07:12 )             30.1     07-31    147<H>  |  112<H>  |  37<H>  ----------------------------<  85  4.3   |  28  |  1.20    Ca    8.4<L>      31 Jul 2018 07:12      PT/INR - ( 31 Jul 2018 07:12 )   PT: 14.3 sec;   INR: 1.30 ratio         PTT - ( 31 Jul 2018 07:12 )  PTT:34.0 sec Vascular Attending:  Dr. Levi      HPI:  84 yo female w/ PMH of afib on coumadin, CVA and hx of aneurysm repair, HTN, HLD, mitral valve prolapse, PVD s/p stents x 3 (RLE), rheumatoid arthritis, who presents with one week of subjective fever (measured at home, pt doesn't remember temp), diaphoresis chills, weakness, and lethargy.     The patient was in her usual state of health until one week prior to presentation when she started to feel subjective fevers, chills, and diaphoresis. She then measured her temperature on the day of admission was noted it was "high" though she couldn't remember the exact temperature. She endorsed nocturia and urinary incontinence, though she notes both the nocturia and urinary incontinence are chronic and the frequency remains unchanged from prior.  She denies dysuria, hematuria, urinary frequency, urinary hesitancy.     Denies claudication, rest pain, ulcers.            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  Osteoarthritis  Afib  PVD (peripheral vascular disease)  Neuropathy: (Right lower leg)  H/O cerebral aneurysm repair: brain clips  CVA (cerebral vascular accident): (&quot;Mini-stroke&quot;,1990&#x27;s)  Rheumatoid arthritis  Hyperlipidemia  Hypertension  S/P cataract surgery: (Left eye)  Elective surgery: (&quot;Twisted bowel&quot;, 2014)  Elective surgery: (Exision of cyst on liver, 1985)  S/P ORIF (open reduction internal fixation) fracture: Left hip fx (2012) &amp; R hip fx (2013)  H/O cerebral aneurysm repair: Brain clips (1978(  Renal stone: Cysto stent placement 10/1/2014  PVD (peripheral vascular disease): s/p RLE bypass x 3, most recent 3/2012 Right external iliac to PT bypass w/ PTFE (2012)  S/P FRED (total abdominal hysterectomy): (1987, Hx of &quot;ovarian cancer?&quot;)      MEDICATIONS  (STANDING):  ammonium lactate 12% Lotion 1 Application(s) Topical two times a day  aspirin enteric coated 81 milliGRAM(s) Oral daily  atorvastatin 40 milliGRAM(s) Oral at bedtime  diltiazem    milliGRAM(s) Oral daily  docusate sodium 100 milliGRAM(s) Oral three times a day  enoxaparin Injectable 50 milliGRAM(s) SubCutaneous daily  ertapenem  IVPB      ertapenem  IVPB 500 milliGRAM(s) IV Intermittent every 24 hours  gabapentin 100 milliGRAM(s) Oral three times a day  glycerin Suppository - Adult 1 Suppository(s) Rectal daily  hydrALAZINE 50 milliGRAM(s) Oral three times a day  metoprolol succinate  milliGRAM(s) Oral daily  mirtazapine 7.5 milliGRAM(s) Oral at bedtime  pantoprazole    Tablet 40 milliGRAM(s) Oral before breakfast  polyethylene glycol 3350 17 Gram(s) Oral two times a day  senna 2 Tablet(s) Oral at bedtime  warfarin 6 milliGRAM(s) Oral once    MEDICATIONS  (PRN):  acetaminophen   Tablet 650 milliGRAM(s) Oral every 8 hours PRN Mild Pain (1 - 3)      Allergies    No Known Allergies    Intolerances        FAMILY HISTORY:  No pertinent family history in first degree relatives      SOCIAL HISTORY:    ROS: 10-system review is otherwise negative except HPI above.      Vital Signs Last 24 Hrs  T(C): 36.8 (31 Jul 2018 13:51), Max: 37.1 (30 Jul 2018 20:05)  T(F): 98.3 (31 Jul 2018 13:51), Max: 98.7 (30 Jul 2018 20:05)  HR: 83 (31 Jul 2018 13:51) (76 - 90)  BP: 108/71 (31 Jul 2018 13:51) (103/63 - 176/89)  BP(mean): --  RR: 16 (31 Jul 2018 13:51) (16 - 16)  SpO2: 93% (31 Jul 2018 13:51) (93% - 98%)    General:  NAD  Neuro: 5/5 motor function in all 4 extremities, sensation intact; AAOX3  HEENT:  no facial droop; no ptosis or facial edema  Neck:  Supple, no carotid bruits  Respiratory: CTA B/L  CV: RRR, S1S2, no murmur  Abdominal: Soft, NT, ND no palpable mass, no pulsatile mass, no bruits  Ext: No edema, pink, well-perfused; capillary refill <2sec bilaterally  Musc: FROM all 4 extremity  Vasc:   Right    Axillary  2+ [x ]  1+ [ ] doppler [ ]                  Left   Axillary  2+ [x ]  1+ [ ] doppler [ ]               Brachial  2+ [x ]  1+ [ ] doppler [ ]                           Brachial  2+ [x ]  1+ [ ] doppler [ ]               Radial  2+ [x ]  1+ [ ] doppler [ ]                              Radial 2+ [x ]  1+ [ ] doppler [ ]               Ulnar  2+ [x ]  1+ [ ] doppler [ ]                               Ulnar  2+ [x ]  1+ [ ] doppler [ ]               Femoral  2+ [x ]  1+ [ ] doppler [ ]                          Femoral  2+ [x ]  1+ [ ] doppler [ ]               Popliteal  2+ [ ]  1+ [ ] doppler [x ]                         Popliteal  2+ [x ]  1+ [ ] doppler [ ]               Dorsalis Pedis  2+ [ ]  1+ [ ] doppler [x ]                Dorsalis Pedis  2+ [x ]  1+ [ ] doppler [ ]               Posterior Tibial  2+ [ ]  1+ [ ] doppler [x ]              Posterior Tibial  2+ [x ]  1+ [ ] doppler [ ]      LABS:                        9.1    6.66  )-----------( 254      ( 31 Jul 2018 07:12 )             30.1     07-31    147<H>  |  112<H>  |  37<H>  ----------------------------<  85  4.3   |  28  |  1.20    Ca    8.4<L>      31 Jul 2018 07:12      PT/INR - ( 31 Jul 2018 07:12 )   PT: 14.3 sec;   INR: 1.30 ratio         PTT - ( 31 Jul 2018 07:12 )  PTT:34.0 sec Vascular Attending:  Dr. Levi      HPI:  84 yo female w/ PMH of afib on coumadin, CVA and hx of aneurysm repair, HTN, HLD, mitral valve prolapse, PVD s/p stents x 3 (RLE), rheumatoid arthritis, who presents with one week of subjective fever (measured at home, pt doesn't remember temp), diaphoresis chills, weakness, and lethargy.     The patient was in her usual state of health until one week prior to presentation when she started to feel subjective fevers, chills, and diaphoresis. She then measured her temperature on the day of admission was noted it was "high" though she couldn't remember the exact temperature. She endorsed nocturia and urinary incontinence, though she notes both the nocturia and urinary incontinence are chronic and the frequency remains unchanged from prior.  She denies dysuria, hematuria, urinary frequency, urinary hesitancy.     Denies claudication, rest pain, ulcers. History of R fem-pop bypass x3 by Dr. Beasley and Dr. Reno.        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  Osteoarthritis  Afib  PVD (peripheral vascular disease)  Neuropathy: (Right lower leg)  H/O cerebral aneurysm repair: brain clips  CVA (cerebral vascular accident): (&quot;Mini-stroke&quot;,1990&#x27;s)  Rheumatoid arthritis  Hyperlipidemia  Hypertension  S/P cataract surgery: (Left eye)  Elective surgery: (&quot;Twisted bowel&quot;, 2014)  Elective surgery: (Exision of cyst on liver, 1985)  S/P ORIF (open reduction internal fixation) fracture: Left hip fx (2012) &amp; R hip fx (2013)  H/O cerebral aneurysm repair: Brain clips (1978(  Renal stone: Cysto stent placement 10/1/2014  PVD (peripheral vascular disease): s/p RLE bypass x 3, most recent 3/2012 Right external iliac to PT bypass w/ PTFE (2012)  S/P FRED (total abdominal hysterectomy): (1987, Hx of &quot;ovarian cancer?&quot;)      MEDICATIONS  (STANDING):  ammonium lactate 12% Lotion 1 Application(s) Topical two times a day  aspirin enteric coated 81 milliGRAM(s) Oral daily  atorvastatin 40 milliGRAM(s) Oral at bedtime  diltiazem    milliGRAM(s) Oral daily  docusate sodium 100 milliGRAM(s) Oral three times a day  enoxaparin Injectable 50 milliGRAM(s) SubCutaneous daily  ertapenem  IVPB      ertapenem  IVPB 500 milliGRAM(s) IV Intermittent every 24 hours  gabapentin 100 milliGRAM(s) Oral three times a day  glycerin Suppository - Adult 1 Suppository(s) Rectal daily  hydrALAZINE 50 milliGRAM(s) Oral three times a day  metoprolol succinate  milliGRAM(s) Oral daily  mirtazapine 7.5 milliGRAM(s) Oral at bedtime  pantoprazole    Tablet 40 milliGRAM(s) Oral before breakfast  polyethylene glycol 3350 17 Gram(s) Oral two times a day  senna 2 Tablet(s) Oral at bedtime  warfarin 6 milliGRAM(s) Oral once    MEDICATIONS  (PRN):  acetaminophen   Tablet 650 milliGRAM(s) Oral every 8 hours PRN Mild Pain (1 - 3)      Allergies    No Known Allergies    Intolerances        FAMILY HISTORY:  No pertinent family history in first degree relatives      SOCIAL HISTORY: Denies smoking, alcohol or illicit drug use    ROS: 10-system review is otherwise negative except HPI above.      Vital Signs Last 24 Hrs  T(C): 36.8 (31 Jul 2018 13:51), Max: 37.1 (30 Jul 2018 20:05)  T(F): 98.3 (31 Jul 2018 13:51), Max: 98.7 (30 Jul 2018 20:05)  HR: 83 (31 Jul 2018 13:51) (76 - 90)  BP: 108/71 (31 Jul 2018 13:51) (103/63 - 176/89)  BP(mean): --  RR: 16 (31 Jul 2018 13:51) (16 - 16)  SpO2: 93% (31 Jul 2018 13:51) (93% - 98%)    General:  NAD  Neuro: 5/5 motor function in all 4 extremities, sensation intact; AAOX3  HEENT:  no facial droop; no ptosis or facial edema  Neck:  Supple, no carotid bruits  Respiratory: CTA B/L  CV: RRR, S1S2, no murmur  Abdominal: Soft, NT, ND no palpable mass, no pulsatile mass, no bruits  Ext: No edema, pink, well-perfused; capillary refill <2sec bilaterally  Musc: FROM all 4 extremity  Vasc:   Right    Axillary  2+ [x ]  1+ [ ] doppler [ ]                  Left   Axillary  2+ [x ]  1+ [ ] doppler [ ]               Brachial  2+ [x ]  1+ [ ] doppler [ ]                           Brachial  2+ [x ]  1+ [ ] doppler [ ]               Radial  2+ [x ]  1+ [ ] doppler [ ]                              Radial 2+ [x ]  1+ [ ] doppler [ ]               Ulnar  2+ [x ]  1+ [ ] doppler [ ]                               Ulnar  2+ [x ]  1+ [ ] doppler [ ]               Femoral  2+ [x ]  1+ [ ] doppler [ ]                          Femoral  2+ [x ]  1+ [ ] doppler [ ]               Popliteal  2+ [ ]  1+ [ ] doppler [x ]                         Popliteal  2+ [x ]  1+ [ ] doppler [ ]               Dorsalis Pedis  2+ [ ]  1+ [ ] doppler [x ]                Dorsalis Pedis  2+ [x ]  1+ [ ] doppler [ ]               Posterior Tibial  2+ [ ]  1+ [ ] doppler [x ]              Posterior Tibial  2+ [x ]  1+ [ ] doppler [ ]      LABS:                        9.1    6.66  )-----------( 254      ( 31 Jul 2018 07:12 )             30.1     07-31    147<H>  |  112<H>  |  37<H>  ----------------------------<  85  4.3   |  28  |  1.20    Ca    8.4<L>      31 Jul 2018 07:12      PT/INR - ( 31 Jul 2018 07:12 )   PT: 14.3 sec;   INR: 1.30 ratio         PTT - ( 31 Jul 2018 07:12 )  PTT:34.0 sec

## 2018-07-31 NOTE — PROGRESS NOTE ADULT - SUBJECTIVE AND OBJECTIVE BOX
[facility] -- Dahiana Bañuelos, Toney Horner, Truman Falcon Savella  Office # [phone]    Follow Up:      Subjective/Observations:   Sitting on the chair, comfortable on RA.  No cardiac complaints.  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  Osteoarthritis  Afib  PVD (peripheral vascular disease)  Neuropathy: (Right lower leg)  H/O cerebral aneurysm repair: brain clips  CVA (cerebral vascular accident): (&quot;Mini-stroke&quot;,1990&#x27;s)  Rheumatoid arthritis  Hyperlipidemia  Hypertension  S/P cataract surgery: (Left eye)  Elective surgery: (&quot;Twisted bowel&quot;, 2014)  Elective surgery: (Exision of cyst on liver, 1985)  S/P ORIF (open reduction internal fixation) fracture: Left hip fx (2012) &amp; R hip fx (2013)  H/O cerebral aneurysm repair: Brain clips (1978(  Renal stone: Cysto stent placement 10/1/2014  PVD (peripheral vascular disease): s/p RLE bypass x 3, most recent 3/2012 Right external iliac to PT bypass w/ PTFE (2012)  S/P FRED (total abdominal hysterectomy): (1987, Hx of &quot;ovarian cancer?&quot;)    MEDICATIONS  (STANDING):  ammonium lactate 12% Lotion 1 Application(s) Topical two times a day  aspirin enteric coated 81 milliGRAM(s) Oral daily  atorvastatin 40 milliGRAM(s) Oral at bedtime  diltiazem    milliGRAM(s) Oral daily  docusate sodium 100 milliGRAM(s) Oral three times a day  enoxaparin Injectable 50 milliGRAM(s) SubCutaneous daily  ertapenem  IVPB      ertapenem  IVPB 500 milliGRAM(s) IV Intermittent every 24 hours  gabapentin 100 milliGRAM(s) Oral three times a day  glycerin Suppository - Adult 1 Suppository(s) Rectal daily  hydrALAZINE 50 milliGRAM(s) Oral three times a day  metoprolol succinate  milliGRAM(s) Oral daily  mirtazapine 7.5 milliGRAM(s) Oral at bedtime  pantoprazole    Tablet 40 milliGRAM(s) Oral before breakfast  polyethylene glycol 3350 17 Gram(s) Oral two times a day  senna 2 Tablet(s) Oral at bedtime  warfarin 6 milliGRAM(s) Oral once    MEDICATIONS  (PRN):  acetaminophen   Tablet 650 milliGRAM(s) Oral every 8 hours PRN Mild Pain (1 - 3)      Allergies    No Known Allergies    Intolerances    Vital Signs Last 24 Hrs  T(C): 36.8 (31 Jul 2018 13:51), Max: 37.1 (30 Jul 2018 20:05)  T(F): 98.3 (31 Jul 2018 13:51), Max: 98.7 (30 Jul 2018 20:05)  HR: 83 (31 Jul 2018 13:51) (76 - 90)  BP: 108/71 (31 Jul 2018 13:51) (103/63 - 176/89)  BP(mean): --  RR: 16 (31 Jul 2018 13:51) (16 - 16)  SpO2: 93% (31 Jul 2018 13:51) (93% - 98%)    I&O's Summary    30 Jul 2018 07:01  -  31 Jul 2018 07:00  --------------------------------------------------------  IN: 50 mL / OUT: 0 mL / NET: 50 mL    31 Jul 2018 07:01  -  31 Jul 2018 17:01  --------------------------------------------------------  IN: 720 mL / OUT: 0 mL / NET: 720 mL     PHYSICAL EXAM:  TELE: Not on tele  Constitutional: NAD, awake and alert, well-developed  HEENT: Moist Mucous Membranes, Anicteric  Pulmonary: Non-labored, breath sounds are clear bilaterally, No wheezing, rales or rhonchi  Cardiovascular: Regular, S1 and S2, No murmurs, rubs, gallops or clicks  Gastrointestinal: Bowel Sounds present, soft, nontender.   Lymph: No peripheral edema. No lymphadenopathy.  Skin: No visible rashes or ulcers.  Psych:  Mood & affect appropriate    LABS: All Labs Reviewed:                        9.1    6.66  )-----------( 254      ( 31 Jul 2018 07:12 )             30.1                         9.7    7.83  )-----------( 297      ( 30 Jul 2018 10:01 )             31.3     31 Jul 2018 07:12    147    |  112    |  37     ----------------------------<  85     4.3     |  28     |  1.20   30 Jul 2018 10:01    143    |  110    |  34     ----------------------------<  111    3.9     |  28     |  1.20     Ca    8.4        31 Jul 2018 07:12  Ca    8.6        30 Jul 2018 10:01    PT/INR - ( 31 Jul 2018 07:12 )   PT: 14.3 sec;   INR: 1.30 ratio      PTT - ( 31 Jul 2018 07:12 )  PTT:34.0 sec    < from: TTE Echo Doppler w/o Cont (02.05.18 @ 08:11) >     EXAM:  ECHO TTE W/O CON COMP W/DOPPLR         PROCEDURE DATE:  02/05/2018        INTERPRETATION:  INDICATION: respiratory failure  Referring M.D.:Lona  Blood Pressure 126/59        Weight (kg) :49     Height (cm):157       BSA (sq m): 1.47  Technician: JOJO    Dimensions:    LA 3.4       Normal Values: 2.0 - 4.0 cm    Ao 3.0        Normal Values: 2.0 - 3.8 cm  SEPTUM 0.8       Normal Values: 0.6 - 1.2 cm  PWT 0.8       Normal Values: 0.6 - 1.1 cm  LVIDd 3.4         Normal Values: 3.0 - 5.6 cm  LVIDs 2.3         Normal Values: 1.8 - 4.0 cm      OBSERVATIONS:  Technically difficult bedside ICU study. The patient is vented  Mitral Valve: Calcified mitral annulus and mitral valve leaflets with   normal opening. Mild mitral regurgitation.  Aortic Valve/Aorta: Calcified trileaflet aortic valve with normal opening.  Tricuspid Valve: Calcified trileaflet cusp at annulus with normally   opening valve leaflets. Mild tricuspid regurgitation.  Pulmonic Valve: The pulmonic valve is not well visualized. Probably   normal. Mild PI  Left Atrium: Small to moderate left atrial enlargement  Right Atrium: Normal  Left Ventricle: The endocardium is not well-visualized. There appears to   be grossly preserved left ventricular systolic function. There appears to   be concentric left ventricular hypertrophy. The EF is approximately 65%.  Right Ventricle: Normal right ventricular size and function. There is a   device wire noted in the right heart  Pericardium/Pleura: No pericardial effusion noted. Left pleural effusion   noted  Pulmonary/RV Pressure: The right ventricular systolic pressure is   estimated to be 41mmHg, assuming that the right atrial pressure is   estimated to be 8 mmHg. This is consistent with mild pulmonary   hypertension.  LV Diastolic Function: Stage II diastolic dysfunction    Conclusion: Likely difficult and limited study. Grossly preserved left   ventricular systolic function. Sensation diastolic dysfunction. Left   pleural effusion is noted.    KIET NIETO M.D., ATTENDING CARDIOLOGIST  This document has been electronically signed. Feb 6 2018  7:49PM      < end of copied text >

## 2018-07-31 NOTE — PROGRESS NOTE ADULT - PROBLEM SELECTOR PLAN 1
ESBL bacteremia  on invanz for 1 more day, ends wed 8/1 then can dc home; afebrile, no leukocytosis.   repeat blood culture ngtd   ID Dr. armendariz following. will f/u recs for abx course.

## 2018-07-31 NOTE — CONSULT NOTE ADULT - ATTENDING COMMENTS
patient seen and evaluated   Xrays reviewed   Recommend ammonium lactate cream to apply to the callus BID  No open wound noted  Thank you for your consult
I saw and examined the patient personally. Spoke with above provider regarding this case. I reviewed the above findings completely.  I agree with the above history, physical, and plan which I have edited where appropriate.

## 2018-07-31 NOTE — PROGRESS NOTE ADULT - PROBLEM SELECTOR PLAN 6
- c/w statin
- c/w statin
- atorvastatin 40 mg PO daily
- c/w statin
- chronic, stable  -  atorvastatin 40 mg PO daily
- stable   - continue current meds, hydralazine, metoprolol, Cardizem
- atorvastatin 40 mg PO daily
- c/w statin
- c/w statin

## 2018-07-31 NOTE — CONSULT NOTE ADULT - PROVIDER SPECIALTY LIST ADULT
Cardiology
Endocrinology
Gastroenterology
Infectious Disease
Surgery
Vascular Surgery
Vascular Surgery
Podiatry

## 2018-07-31 NOTE — PROGRESS NOTE ADULT - SUBJECTIVE AND OBJECTIVE BOX
Patient is a 83y old  Female who presents with a chief complaint of fever, x 1 week, weakness (23 Jul 2018 10:18)      INTERVAL HPI/OVERNIGHT EVENTS:    MEDICATIONS  (STANDING):  aspirin enteric coated 81 milliGRAM(s) Oral daily  atorvastatin 40 milliGRAM(s) Oral at bedtime  diltiazem    milliGRAM(s) Oral daily  docusate sodium 100 milliGRAM(s) Oral three times a day  enoxaparin Injectable 50 milliGRAM(s) SubCutaneous daily  ertapenem  IVPB      ertapenem  IVPB 500 milliGRAM(s) IV Intermittent every 24 hours  gabapentin 100 milliGRAM(s) Oral three times a day  glycerin Suppository - Adult 1 Suppository(s) Rectal daily  hydrALAZINE 50 milliGRAM(s) Oral three times a day  metoprolol succinate  milliGRAM(s) Oral daily  mirtazapine 7.5 milliGRAM(s) Oral at bedtime  pantoprazole    Tablet 40 milliGRAM(s) Oral before breakfast  polyethylene glycol 3350 17 Gram(s) Oral two times a day  senna 2 Tablet(s) Oral at bedtime  warfarin 5 milliGRAM(s) Oral once    MEDICATIONS  (PRN):  acetaminophen   Tablet 650 milliGRAM(s) Oral every 8 hours PRN Mild Pain (1 - 3)      Allergies    No Known Allergies    Intolerances        REVIEW OF SYSTEMS:  CONSTITUTIONAL: No fever, chills, or fatigue  RESPIRATORY: No cough, or wheezing; No shortness of breath  CARDIOVASCULAR: No chest pain, palpitations, or leg swelling  GASTROINTESTINAL: No abdominal or epigastric pain. No nausea, vomiting, diarrhea or constipation.   GENITOURINARY: No dysuria, or frequency, + incontinence  NEUROLOGICAL: No headaches, loss of strength, or numbness  MUSCULOSKELETAL: No joint pain or swelling; No muscle, back, or extremity pain      Vital Signs Last 24 Hrs  T(C): 36.5 (31 Jul 2018 05:23), Max: 37.1 (30 Jul 2018 20:05)  T(F): 97.7 (31 Jul 2018 05:23), Max: 98.7 (30 Jul 2018 20:05)  HR: 76 (31 Jul 2018 05:23) (76 - 90)  BP: 176/89 (31 Jul 2018 05:23) (103/63 - 176/89)  BP(mean): --  RR: 16 (31 Jul 2018 05:23) (16 - 16)  SpO2: 98% (31 Jul 2018 05:23) (94% - 98%)    PHYSICAL EXAM:  GENERAL: No acute distress, well-groomed, well-developed  HEAD:  Atraumatic, Normocephalic  EYES: +strabismus, PERRL, conjunctiva and sclera clear  NERVOUS SYSTEM:  Alert & Oriented X3, Good concentration  CHEST/LUNG: Clear to auscultation bilaterally; No rales, rhonchi, wheezing, or rubs  HEART: Regular rate and rhythm; No murmurs, rubs, or gallops  ABDOMEN: Soft, Nontender, Nondistended; Bowel sounds present  EXTREMITIES: No clubbing, cyanosis, or edema  SKIN: No rashes or lesions    LABS:                        9.1    6.66  )-----------( 254      ( 31 Jul 2018 07:12 )             30.1     31 Jul 2018 07:12    147    |  112    |  37     ----------------------------<  85     4.3     |  28     |  1.20     Ca    8.4        31 Jul 2018 07:12      PT/INR - ( 31 Jul 2018 07:12 )   PT: 14.3 sec;   INR: 1.30 ratio         PTT - ( 31 Jul 2018 07:12 )  PTT:34.0 sec  CAPILLARY BLOOD GLUCOSE        BLOOD CULTURE    RADIOLOGY & ADDITIONAL TESTS:    Imaging Personally Reviewed:  [ ] YES     Consultant(s) Notes Reviewed:      Care Discussed with Consultants/Other Providers: Patient is a 83y old  Female who presents with a chief complaint of fever, x 1 week, weakness (23 Jul 2018 10:18)      INTERVAL HPI/OVERNIGHT EVENTS:  Pt is feeling better. No abdominal pain, fevers, dysuria. Good appetite. No o/n events.    MEDICATIONS  (STANDING):  aspirin enteric coated 81 milliGRAM(s) Oral daily  atorvastatin 40 milliGRAM(s) Oral at bedtime  diltiazem    milliGRAM(s) Oral daily  docusate sodium 100 milliGRAM(s) Oral three times a day  enoxaparin Injectable 50 milliGRAM(s) SubCutaneous daily  ertapenem  IVPB      ertapenem  IVPB 500 milliGRAM(s) IV Intermittent every 24 hours  gabapentin 100 milliGRAM(s) Oral three times a day  glycerin Suppository - Adult 1 Suppository(s) Rectal daily  hydrALAZINE 50 milliGRAM(s) Oral three times a day  metoprolol succinate  milliGRAM(s) Oral daily  mirtazapine 7.5 milliGRAM(s) Oral at bedtime  pantoprazole    Tablet 40 milliGRAM(s) Oral before breakfast  polyethylene glycol 3350 17 Gram(s) Oral two times a day  senna 2 Tablet(s) Oral at bedtime  warfarin 5 milliGRAM(s) Oral once    MEDICATIONS  (PRN):  acetaminophen   Tablet 650 milliGRAM(s) Oral every 8 hours PRN Mild Pain (1 - 3)      Allergies    No Known Allergies    Intolerances        REVIEW OF SYSTEMS:  CONSTITUTIONAL: No fever, chills, or fatigue  RESPIRATORY: No cough, or wheezing; No shortness of breath  CARDIOVASCULAR: No chest pain, palpitations, or leg swelling  GASTROINTESTINAL: No abdominal or epigastric pain. No nausea, vomiting, diarrhea or constipation.   GENITOURINARY: No dysuria, or frequency, + incontinence  NEUROLOGICAL: No headaches, loss of strength, or numbness  MUSCULOSKELETAL: No joint pain or swelling; No muscle, back, or extremity pain      Vital Signs Last 24 Hrs  T(C): 36.5 (31 Jul 2018 05:23), Max: 37.1 (30 Jul 2018 20:05)  T(F): 97.7 (31 Jul 2018 05:23), Max: 98.7 (30 Jul 2018 20:05)  HR: 76 (31 Jul 2018 05:23) (76 - 90)  BP: 176/89 (31 Jul 2018 05:23) (103/63 - 176/89)  BP(mean): --  RR: 16 (31 Jul 2018 05:23) (16 - 16)  SpO2: 98% (31 Jul 2018 05:23) (94% - 98%)    PHYSICAL EXAM:  GENERAL: No acute distress, well-groomed, well-developed  HEAD:  Atraumatic, Normocephalic  EYES: +strabismus, PERRL, conjunctiva and sclera clear  NERVOUS SYSTEM:  Alert & Oriented X3, Good concentration  CHEST/LUNG: Clear to auscultation bilaterally; No rales, rhonchi, wheezing, or rubs  HEART: Regular rate and rhythm; No murmurs, rubs, or gallops  ABDOMEN: Soft, Nontender, Nondistended; Bowel sounds present  EXTREMITIES: No clubbing, cyanosis, or edema  SKIN: No rashes or lesions    LABS:                        9.1    6.66  )-----------( 254      ( 31 Jul 2018 07:12 )             30.1     31 Jul 2018 07:12    147    |  112    |  37     ----------------------------<  85     4.3     |  28     |  1.20     Ca    8.4        31 Jul 2018 07:12      PT/INR - ( 31 Jul 2018 07:12 )   PT: 14.3 sec;   INR: 1.30 ratio         PTT - ( 31 Jul 2018 07:12 )  PTT:34.0 sec  CAPILLARY BLOOD GLUCOSE        BLOOD CULTURE    RADIOLOGY & ADDITIONAL TESTS:    Imaging Personally Reviewed:  [ ] YES     Consultant(s) Notes Reviewed:      Care Discussed with Consultants/Other Providers: Patient is a 83y old  Female who presents with a chief complaint of fever, x 1 week, weakness (23 Jul 2018 10:18)      INTERVAL HPI/OVERNIGHT EVENTS:  Pt is feeling better. No abdominal pain, fevers, dysuria. Good appetite. No overnight  events.    MEDICATIONS  (STANDING):  aspirin enteric coated 81 milliGRAM(s) Oral daily  atorvastatin 40 milliGRAM(s) Oral at bedtime  diltiazem    milliGRAM(s) Oral daily  docusate sodium 100 milliGRAM(s) Oral three times a day  enoxaparin Injectable 50 milliGRAM(s) SubCutaneous daily  ertapenem  IVPB      ertapenem  IVPB 500 milliGRAM(s) IV Intermittent every 24 hours  gabapentin 100 milliGRAM(s) Oral three times a day  glycerin Suppository - Adult 1 Suppository(s) Rectal daily  hydrALAZINE 50 milliGRAM(s) Oral three times a day  metoprolol succinate  milliGRAM(s) Oral daily  mirtazapine 7.5 milliGRAM(s) Oral at bedtime  pantoprazole    Tablet 40 milliGRAM(s) Oral before breakfast  polyethylene glycol 3350 17 Gram(s) Oral two times a day  senna 2 Tablet(s) Oral at bedtime  warfarin 5 milliGRAM(s) Oral once    MEDICATIONS  (PRN):  acetaminophen   Tablet 650 milliGRAM(s) Oral every 8 hours PRN Mild Pain (1 - 3)      Allergies    No Known Allergies    Intolerances        REVIEW OF SYSTEMS:  CONSTITUTIONAL: No fever, chills, or fatigue  RESPIRATORY: No cough, or wheezing; No shortness of breath  CARDIOVASCULAR: No chest pain, palpitations, or leg swelling  GASTROINTESTINAL: No abdominal or epigastric pain. No nausea, vomiting, diarrhea or constipation.   GENITOURINARY: No dysuria, or frequency, + incontinence  NEUROLOGICAL: No headaches, loss of strength, or numbness  MUSCULOSKELETAL: No joint pain or swelling; No muscle, back, or extremity pain      Vital Signs Last 24 Hrs  T(C): 36.5 (31 Jul 2018 05:23), Max: 37.1 (30 Jul 2018 20:05)  T(F): 97.7 (31 Jul 2018 05:23), Max: 98.7 (30 Jul 2018 20:05)  HR: 76 (31 Jul 2018 05:23) (76 - 90)  BP: 176/89 (31 Jul 2018 05:23) (103/63 - 176/89)  BP(mean): --  RR: 16 (31 Jul 2018 05:23) (16 - 16)  SpO2: 98% (31 Jul 2018 05:23) (94% - 98%)    PHYSICAL EXAM:  GENERAL: No acute distress, well-groomed, well-developed  HEAD:  Atraumatic, Normocephalic  EYES: +strabismus, PERRL, conjunctiva and sclera clear  NERVOUS SYSTEM:  Alert & Oriented X3, Good concentration  CHEST/LUNG: Clear to auscultation bilaterally; No rales, rhonchi, wheezing, or rubs  HEART: Regular rate and rhythm; No murmurs, rubs, or gallops  ABDOMEN: Soft, Nontender, Nondistended; Bowel sounds present  EXTREMITIES: No clubbing, cyanosis, or edema  SKIN: No rashes or lesions    LABS:                        9.1    6.66  )-----------( 254      ( 31 Jul 2018 07:12 )             30.1     31 Jul 2018 07:12    147    |  112    |  37     ----------------------------<  85     4.3     |  28     |  1.20     Ca    8.4        31 Jul 2018 07:12      PT/INR - ( 31 Jul 2018 07:12 )   PT: 14.3 sec;   INR: 1.30 ratio         PTT - ( 31 Jul 2018 07:12 )  PTT:34.0 sec  CAPILLARY BLOOD GLUCOSE        BLOOD CULTURE    RADIOLOGY & ADDITIONAL TESTS:    Imaging Personally Reviewed:  [ ] YES     Consultant(s) Notes Reviewed:      Care Discussed with Consultants/Other Providers:

## 2018-07-31 NOTE — PROGRESS NOTE ADULT - SUBJECTIVE AND OBJECTIVE BOX
INTERVAL HPI/OVERNIGHT EVENTS:  pt seen and examined  pt denies n/v/d/c, +bm yesterday  states shes going home tomorrow  labs noted afebrile overnight    MEDICATIONS  (STANDING):  aspirin enteric coated 81 milliGRAM(s) Oral daily  atorvastatin 40 milliGRAM(s) Oral at bedtime  diltiazem    milliGRAM(s) Oral daily  docusate sodium 100 milliGRAM(s) Oral three times a day  enoxaparin Injectable 50 milliGRAM(s) SubCutaneous daily  ertapenem  IVPB      ertapenem  IVPB 500 milliGRAM(s) IV Intermittent every 24 hours  gabapentin 100 milliGRAM(s) Oral three times a day  glycerin Suppository - Adult 1 Suppository(s) Rectal daily  hydrALAZINE 50 milliGRAM(s) Oral three times a day  metoprolol succinate  milliGRAM(s) Oral daily  mirtazapine 7.5 milliGRAM(s) Oral at bedtime  pantoprazole    Tablet 40 milliGRAM(s) Oral before breakfast  polyethylene glycol 3350 17 Gram(s) Oral two times a day  senna 2 Tablet(s) Oral at bedtime  warfarin 5 milliGRAM(s) Oral once    MEDICATIONS  (PRN):  acetaminophen   Tablet 650 milliGRAM(s) Oral every 8 hours PRN Mild Pain (1 - 3)      Allergies    No Known Allergies    Intolerances        Review of Systems:    General:  No wt loss, fevers, chills, night sweats, fatigue   Eyes:  Good vision, no reported pain  ENT:  No sore throat, pain, runny nose, dysphagia  CV:  No pain, palpitations, hypo/hypertension  Resp:  No dyspnea, cough, tachypnea, wheezing  GI:  No pain, No nausea, No vomiting, No diarrhea, No constipation, No weight loss, No fever, No pruritis, No rectal bleeding, No melena, No dysphagia  :  No pain, bleeding, incontinence, nocturia  Muscle:  No pain, weakness  Neuro:  No weakness, tingling, memory problems  Psych:  No fatigue, insomnia, mood problems, depression  Endocrine:  No polyuria, polydypsia, cold/heat intolerance  Heme:  No petechiae, ecchymosis, easy bruisability  Skin:  No rash, tattoos, scars, edema      Vital Signs Last 24 Hrs  T(C): 36.5 (31 Jul 2018 05:23), Max: 37.1 (30 Jul 2018 20:05)  T(F): 97.7 (31 Jul 2018 05:23), Max: 98.7 (30 Jul 2018 20:05)  HR: 76 (31 Jul 2018 05:23) (76 - 90)  BP: 176/89 (31 Jul 2018 05:23) (103/63 - 176/89)  BP(mean): --  RR: 16 (31 Jul 2018 05:23) (16 - 16)  SpO2: 98% (31 Jul 2018 05:23) (94% - 98%)    PHYSICAL EXAM:    Constitutional: NAD, lying in bed  HEENT: ncat  Neck: No LAD  Respiratory: dec bs  Cardiovascular: S1 and S2, RRR  Gastrointestinal: BS+, soft,  nt, nd  Extremities: No peripheral edema  Vascular: 2+ peripheral pulses  Neurological: Awake alert responds appropriately  Skin: No rashes      LABS:                        9.1    6.66  )-----------( 254      ( 31 Jul 2018 07:12 )             30.1     07-31    147<H>  |  112<H>  |  37<H>  ----------------------------<  85  4.3   |  28  |  1.20    Ca    8.4<L>      31 Jul 2018 07:12      PT/INR - ( 31 Jul 2018 07:12 )   PT: 14.3 sec;   INR: 1.30 ratio         PTT - ( 31 Jul 2018 07:12 )  PTT:34.0 sec      RADIOLOGY & ADDITIONAL TESTS:

## 2018-07-31 NOTE — CONSULT NOTE ADULT - CONSULT REQUESTED DATE/TIME
21-Jul-2018 21:21
23-Jul-2018 08:31
23-Jul-2018 11:15
23-Jul-2018 13:36
24-Jul-2018 07:21
25-Jul-2018 12:45
31-Jul-2018 17:00
31-Jul-2018 09:25

## 2018-07-31 NOTE — PROGRESS NOTE ADULT - PROBLEM SELECTOR PROBLEM 7
PVD (peripheral vascular disease)
PVD (peripheral vascular disease)
AAA (abdominal aortic aneurysm)
Abdominal aortic aneurysm (AAA) without rupture
Abdominal aortic aneurysm (AAA) without rupture
Hyperlipidemia
Abdominal aortic aneurysm (AAA) without rupture
PVD (peripheral vascular disease)
PVD (peripheral vascular disease)

## 2018-07-31 NOTE — CONSULT NOTE ADULT - SUBJECTIVE AND OBJECTIVE BOX
82 y/o female patient was seen at bedside for right heel pain due to callus. patient states she had the callus for a long time and it is painful to touch. patient states she had vascular intervention for her legs previously. Pt denies F/N/V/C/SOB.     Vital Signs Last 24 Hrs  T(C): 36.8 (31 Jul 2018 13:51), Max: 37.1 (30 Jul 2018 20:05)  T(F): 98.3 (31 Jul 2018 13:51), Max: 98.7 (30 Jul 2018 20:05)  HR: 83 (31 Jul 2018 13:51) (76 - 90)  BP: 108/71 (31 Jul 2018 13:51) (103/63 - 176/89)  BP(mean): --  RR: 16 (31 Jul 2018 13:51) (16 - 16)  SpO2: 93% (31 Jul 2018 13:51) (93% - 98%)                          9.1    6.66  )-----------( 254      ( 31 Jul 2018 07:12 )             30.1   07-31    147<H>  |  112<H>  |  37<H>  ----------------------------<  85  4.3   |  28  |  1.20    Ca    8.4<L>      31 Jul 2018 07:12    Lower Extremity Exam:   Vasc: DP/PT palpable 1/4, CFT<3 sec, No edema  Neuro: protective sensation intact b/l  Derm: No open leasion, no clinical signs of infection, no interdigital maceration  PPK noted to plantar heel on right with pain on palpation, no erythema, no streaking  MSK: pain on palpation of right heel callus, s/p R 2nd toe amp

## 2018-07-31 NOTE — PROGRESS NOTE ADULT - PROBLEM SELECTOR PLAN 8
- chronic, stable  -  continue remeron.
- ASA 81 mg PO daily   - chronic  - gabapentin 100 mg PO TID for resulting neuropathy  occluded stent also seen on prior CT 10/2017  apprec vascular recs; pt sees Dr. Ponce as outpt vasc, has refused amputation in past, assess quality of life vs risks  apprec pt alexia, hcpt
- ASA 81 mg PO daily   - gabapentin 100 mg PO TID for resulting neuropathy  occluded stent also seen on prior CT 10/2017  consider vascular consult/follow up
- continue aspirin, neurontinin  - per vascular no limb threatening ischemia at this time.   - continue coumadin.   - may require amputation in the future.
- continue aspirin, neurontinin  - per vascular no limb threatening ischemia at this time.   - continue coumadin.   - may require amputation in the future.
- ASA 81 mg PO daily   - gabapentin 100 mg PO TID for resulting neuropathy  occluded stent also seen on prior CT 10/2017  apprec vascular recs
- chronic, stable  -  continue remeron.

## 2018-07-31 NOTE — PROGRESS NOTE ADULT - PROBLEM SELECTOR PROBLEM 5
Hypertension
Preoperative evaluation of a medical condition to rule out surgical contraindications (TAR required)
Hypertension

## 2018-07-31 NOTE — PROGRESS NOTE ADULT - PROBLEM SELECTOR PLAN 5
- stable   - continue current meds, hydralazine, metoprolol, Cardizem
- stable   - continue current meds, hydralazine, metoprolol, Cardizem
- chronic, stable  - metoprolol tartrate 25 mg PO daily
- metoprolol tartrate 25 mg PO daily
- stable   - continue current meds, hydralazine, metoprolol, Cardizem
pt is considered a low to intermediate risk surgical candidate for a low to intermediate risk surgical procedure, pt is medically optimized pending cardiac optimization
- chronic, stable  - metoprolol tartrate 25 mg PO daily
- stable   - continue current meds, hydralazine, metoprolol, Cardizem
- stable   - continue current meds, hydralazine, metoprolol, Cardizem

## 2018-07-31 NOTE — PROGRESS NOTE ADULT - SUBJECTIVE AND OBJECTIVE BOX
CARITO CONSTANTINO is a 83yFemale , patient examined and chart reviewed.     INTERVAL HPI/ OVERNIGHT EVENTS:   No events.  Afebrile.    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  Osteoarthritis  Afib  PVD (peripheral vascular disease)  Neuropathy: (Right lower leg)  H/O cerebral aneurysm repair: brain clips  CVA (cerebral vascular accident): (&quot;Mini-stroke&quot;,1990&#x27;s)  Rheumatoid arthritis  Hyperlipidemia  Hypertension  S/P cataract surgery: (Left eye)  Elective surgery: (&quot;Twisted bowel&quot;, 2014)  Elective surgery: (Exision of cyst on liver, 1985)  S/P ORIF (open reduction internal fixation) fracture: Left hip fx (2012) &amp; R hip fx (2013)  H/O cerebral aneurysm repair: Brain clips (1978(  Renal stone: Cysto stent placement 10/1/2014  PVD (peripheral vascular disease): s/p RLE bypass x 3, most recent 3/2012 Right external iliac to PT bypass w/ PTFE (2012)  S/P FRED (total abdominal hysterectomy): (1987, Hx of &quot;ovarian cancer?&quot;)      For details regarding the patient's social history, family history, and other miscellaneous elements, please refer the initial infectious diseases consultation and/or the admitting history and physical examination for this admission.      ROS:  CONSTITUTIONAL:  Negative fever or chills, feels well, good appetite  EYES:  Negative  blurry vision or double vision  CARDIOVASCULAR:  Negative for chest pain or palpitations  RESPIRATORY:  Negative for cough, wheezing, or SOB   GASTROINTESTINAL:  Negative for nausea, vomiting, diarrhea, constipation, or abdominal pain  GENITOURINARY:  Negative frequency, urgency or dysuria  NEUROLOGIC:  No headache, confusion, dizziness, lightheadedness  All other systems were reviewed and are negative       Current inpatient medications :    ANTIBIOTICS/RELEVANT:  ertapenem  IVPB      ertapenem  IVPB 500 milliGRAM(s) IV Intermittent every 24 hours      acetaminophen   Tablet 650 milliGRAM(s) Oral every 8 hours PRN  aspirin enteric coated 81 milliGRAM(s) Oral daily  atorvastatin 40 milliGRAM(s) Oral at bedtime  diltiazem    milliGRAM(s) Oral daily  docusate sodium 100 milliGRAM(s) Oral three times a day  enoxaparin Injectable 50 milliGRAM(s) SubCutaneous daily  gabapentin 100 milliGRAM(s) Oral three times a day  glycerin Suppository - Adult 1 Suppository(s) Rectal daily  hydrALAZINE 50 milliGRAM(s) Oral three times a day  metoprolol succinate  milliGRAM(s) Oral daily  mirtazapine 7.5 milliGRAM(s) Oral at bedtime  pantoprazole    Tablet 40 milliGRAM(s) Oral before breakfast  polyethylene glycol 3350 17 Gram(s) Oral two times a day  senna 2 Tablet(s) Oral at bedtime  warfarin 6 milliGRAM(s) Oral once      Objective:    07-30 @ 07:01  -  07-31 @ 07:00  --------------------------------------------------------  IN: 50 mL / OUT: 0 mL / NET: 50 mL    07-31 @ 07:01  -  07-31 @ 16:08  --------------------------------------------------------  IN: 720 mL / OUT: 0 mL / NET: 720 mL      T(C): 36.8 (07-31-18 @ 13:51), Max: 37.1 (07-30-18 @ 20:05)  HR: 83 (07-31-18 @ 13:51) (76 - 90)  BP: 108/71 (07-31-18 @ 13:51) (103/63 - 176/89)  RR: 16 (07-31-18 @ 13:51) (16 - 16)  SpO2: 93% (07-31-18 @ 13:51) (93% - 98%)  Wt(kg): --      Physical Exam:  General: well developed well nourished, in no acute distress  Eyes: sclera anicteric, pupils equal and reactive to light  ENMT: buccal mucosa moist, pharynx not injected  Neck: supple, trachea midline  Lungs: clear, no wheeze/rhonchi  Cardiovascular: regular rate and rhythm, S1 S2  Abdomen: soft, nontender, no organomegaly present, bowel sounds normal  Neurological: alert and oriented x3, Cranial Nerves II-XII grossly intact  Skin: no increased ecchymosis/petechiae/purpura  Lymph Nodes: no palpable cervical/supraclavicular lymph nodes enlargements  Extremities: no cyanosis/clubbing/edema    LABS:                          9.1    6.66  )-----------( 254      ( 31 Jul 2018 07:12 )             30.1       07-31    147<H>  |  112<H>  |  37<H>  ----------------------------<  85  4.3   |  28  |  1.20    Ca    8.4<L>      31 Jul 2018 07:12        PT/INR - ( 31 Jul 2018 07:12 )   PT: 14.3 sec;   INR: 1.30 ratio         PTT - ( 31 Jul 2018 07:12 )  PTT:34.0 sec    Assessment :  82 yo female w/ PMH of afib on coumadin, CVA and hx of aneurysm repair, HTN, HLD, mitral   valve prolapse, PVD s/p stents x 3 (RLE), 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  Appropriate use of antibiotics and adverse effects reviewed.      I have discussed the above plan of care with patient/ family in detail. They expressed understanding of the  treatment plan . Risks, benefits and alternatives discussed in detail. I have asked if they have any questions or concerns and appropriately addressed them to the best of my ability .    > 35 minutes were spent in direct patient care reviewing notes, medications ,labs data/ imaging , discussion with multidisciplinary team.    Thank you for allowing me to participate in care of your patient .    Cynthia Jaimes MD  Infectious Disease  [phone]

## 2018-07-31 NOTE — PROGRESS NOTE ADULT - PROBLEM SELECTOR PLAN 10
- subtherapeutic INR, plan as above bridge with lovenox and coumadin  - scds
- subtherapeutic INR  - scds
- subtherapeutic INR  - scds
IMPROVE VTE Individual Risk Assessment          RISK                                                          Points    [  ] Previous VTE                                                3  [  ] Thrombophilia                                             2  [  ] Lower limb paralysis                                   2        (unable to hold up >15 seconds)    [  ] Current Cancer                                            2         (within 6 months)  [  ] Immobilization > 24 hrs                              1  [  ] ICU/CCU stay > 24 hours                            1  [ X  ] Age > 60                                                    1    IMPROVE VTE Score 1    Padua 5 (with reduced mobility due to weakness, lethargy)   resume coumadin for therapeutic INR, goal inr 2-3  Add GI PPx
IMPROVE VTE Individual Risk Assessment          RISK                                                          Points    [  ] Previous VTE                                                3  [  ] Thrombophilia                                             2  [  ] Lower limb paralysis                                   2        (unable to hold up >15 seconds)    [  ] Current Cancer                                            2         (within 6 months)  [  ] Immobilization > 24 hrs                              1  [  ] ICU/CCU stay > 24 hours                            1  [ X  ] Age > 60                                                    1    IMPROVE VTE Score 1    Padua 5 (with reduced mobility due to weakness, lethargy)   supratherapeutic INR  Add GI PPx
IMPROVE VTE Individual Risk Assessment          RISK                                                          Points    [  ] Previous VTE                                                3  [  ] Thrombophilia                                             2  [  ] Lower limb paralysis                                   2        (unable to hold up >15 seconds)    [  ] Current Cancer                                            2         (within 6 months)  [  ] Immobilization > 24 hrs                              1  [  ] ICU/CCU stay > 24 hours                            1  [ X  ] Age > 60                                                    1    IMPROVE VTE Score 1    Padua 5 (with reduced mobility due to weakness, lethargy)   resume coumadin for therapeutic INR, goal inr 2-3  Add GI PPx

## 2018-07-31 NOTE — PROGRESS NOTE ADULT - ASSESSMENT
82 yo female w/ PMH of afib on coumadin, CVA and hx of aneurysm, HTN, HLD, mitral valve prolapse, PVD s/p stents x 3 (RLE), rheumatoid arthritis, who presents prior to admission with one week of fever, diaphoresis,  chills, weakness, and lethargy admitted with sepsis sec GNR bacteremia and ecoli/klebsiella complicated UTI; 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     Podiatry (Dr. Temple) consulted for R heel ulcer appreciate recommendations

## 2018-07-31 NOTE — PROGRESS NOTE ADULT - PROBLEM SELECTOR PLAN 9
- chronic  - encourage hydration
- subtherapeutic INR, plan as above bridge with lovenox and coumadin  - scds
- chronic, stable  -  continue remeron.
- chronic, stable  - mirtazapine 7.5 mg PO daily
- chronic, stable  - mirtazapine 7.5 mg PO daily
- continue remeron.
- subtherapeutic INR, plan as above bridge with lovenox and coumadin  - scds
- chronic, stable  - mirtazapine 7.5 mg PO daily
- subtherapeutic INR, plan as above bridge with lovenox and coumadin  - scds

## 2018-07-31 NOTE — PROGRESS NOTE ADULT - PROBLEM SELECTOR PLAN 3
fobt neg  anemia likely multifactorial  no overt s/s gib  no active bleeding seen on recent scope  serial cbcs, transfuse prn  cont ppi  consider iron studies/heme eval

## 2018-07-31 NOTE — PROGRESS NOTE ADULT - PROBLEM SELECTOR PROBLEM 9
Hypernatremia
Prophylactic measure
Depression
Prophylactic measure
Depression
Prophylactic measure

## 2018-07-31 NOTE — CONSULT NOTE ADULT - ASSESSMENT
Rec  No need for further studies or interventions  Follow up in office for surveillance of RLE bypass 84 yo F with history of PAD s/p R fem-pop bypasses    Rec  No need for further studies or interventions  Follow up in office for patency surveillance of RLE bypass

## 2018-07-31 NOTE — PROGRESS NOTE ADULT - PROBLEM SELECTOR PROBLEM 8
Depression
PVD (peripheral vascular disease)
Depression
PVD (peripheral vascular disease)

## 2018-07-31 NOTE — PROGRESS NOTE ADULT - PROBLEM SELECTOR PLAN 7
- continue aspirin, neurontinin  - per vascular no limb threatening ischemia at this time.   - continue coumadin.   - Will f/u with her vascular as out patient

## 2018-08-01 VITALS
RESPIRATION RATE: 17 BRPM | OXYGEN SATURATION: 94 % | HEART RATE: 89 BPM | DIASTOLIC BLOOD PRESSURE: 63 MMHG | TEMPERATURE: 98 F | SYSTOLIC BLOOD PRESSURE: 106 MMHG

## 2018-08-01 LAB
ANION GAP SERPL CALC-SCNC: 5 MMOL/L — SIGNIFICANT CHANGE UP (ref 5–17)
APTT BLD: 36.5 SEC — SIGNIFICANT CHANGE UP (ref 27.5–37.4)
BUN SERPL-MCNC: 38 MG/DL — HIGH (ref 7–23)
CALCIUM SERPL-MCNC: 8.5 MG/DL — SIGNIFICANT CHANGE UP (ref 8.5–10.1)
CHLORIDE SERPL-SCNC: 111 MMOL/L — HIGH (ref 96–108)
CO2 SERPL-SCNC: 29 MMOL/L — SIGNIFICANT CHANGE UP (ref 22–31)
CREAT SERPL-MCNC: 1.3 MG/DL — SIGNIFICANT CHANGE UP (ref 0.5–1.3)
GLUCOSE SERPL-MCNC: 79 MG/DL — SIGNIFICANT CHANGE UP (ref 70–99)
HCT VFR BLD CALC: 27.5 % — LOW (ref 34.5–45)
HGB BLD-MCNC: 8.5 G/DL — LOW (ref 11.5–15.5)
INR BLD: 1.35 RATIO — HIGH (ref 0.88–1.16)
MCHC RBC-ENTMCNC: 26.1 PG — LOW (ref 27–34)
MCHC RBC-ENTMCNC: 30.9 GM/DL — LOW (ref 32–36)
MCV RBC AUTO: 84.4 FL — SIGNIFICANT CHANGE UP (ref 80–100)
NRBC # BLD: 0 /100 WBCS — SIGNIFICANT CHANGE UP (ref 0–0)
PLATELET # BLD AUTO: 247 K/UL — SIGNIFICANT CHANGE UP (ref 150–400)
POTASSIUM SERPL-MCNC: 3.9 MMOL/L — SIGNIFICANT CHANGE UP (ref 3.5–5.3)
POTASSIUM SERPL-SCNC: 3.9 MMOL/L — SIGNIFICANT CHANGE UP (ref 3.5–5.3)
PROTHROM AB SERPL-ACNC: 14.8 SEC — HIGH (ref 9.8–12.7)
RBC # BLD: 3.26 M/UL — LOW (ref 3.8–5.2)
RBC # FLD: 17.7 % — HIGH (ref 10.3–14.5)
SODIUM SERPL-SCNC: 145 MMOL/L — SIGNIFICANT CHANGE UP (ref 135–145)
WBC # BLD: 6.83 K/UL — SIGNIFICANT CHANGE UP (ref 3.8–10.5)
WBC # FLD AUTO: 6.83 K/UL — SIGNIFICANT CHANGE UP (ref 3.8–10.5)

## 2018-08-01 PROCEDURE — 99239 HOSP IP/OBS DSCHRG MGMT >30: CPT

## 2018-08-01 PROCEDURE — 99232 SBSQ HOSP IP/OBS MODERATE 35: CPT

## 2018-08-01 RX ORDER — WARFARIN SODIUM 2.5 MG/1
1 TABLET ORAL
Qty: 0 | Refills: 0 | COMMUNITY

## 2018-08-01 RX ORDER — METOPROLOL TARTRATE 50 MG
1 TABLET ORAL
Qty: 10 | Refills: 0
Start: 2018-08-01 | End: 2018-08-10

## 2018-08-01 RX ORDER — DILTIAZEM HCL 120 MG
1 CAPSULE, EXT RELEASE 24 HR ORAL
Qty: 10 | Refills: 0 | OUTPATIENT
Start: 2018-08-01 | End: 2018-08-10

## 2018-08-01 RX ADMIN — GABAPENTIN 100 MILLIGRAM(S): 400 CAPSULE ORAL at 13:04

## 2018-08-01 RX ADMIN — ERTAPENEM SODIUM 100 MILLIGRAM(S): 1 INJECTION, POWDER, LYOPHILIZED, FOR SOLUTION INTRAMUSCULAR; INTRAVENOUS at 13:02

## 2018-08-01 RX ADMIN — PANTOPRAZOLE SODIUM 40 MILLIGRAM(S): 20 TABLET, DELAYED RELEASE ORAL at 06:07

## 2018-08-01 RX ADMIN — Medication 120 MILLIGRAM(S): at 06:07

## 2018-08-01 RX ADMIN — Medication 100 MILLIGRAM(S): at 06:08

## 2018-08-01 RX ADMIN — GABAPENTIN 100 MILLIGRAM(S): 400 CAPSULE ORAL at 06:08

## 2018-08-01 RX ADMIN — Medication 50 MILLIGRAM(S): at 06:07

## 2018-08-01 RX ADMIN — Medication 1 APPLICATION(S): at 06:07

## 2018-08-01 RX ADMIN — Medication 50 MILLIGRAM(S): at 13:04

## 2018-08-01 RX ADMIN — Medication 81 MILLIGRAM(S): at 12:02

## 2018-08-01 NOTE — PROGRESS NOTE ADULT - SUBJECTIVE AND OBJECTIVE BOX
INTERVAL HPI/OVERNIGHT EVENTS:  pt seen and examined  denies gi complaints +bm  labs noted afebrile overnight    MEDICATIONS  (STANDING):  ammonium lactate 12% Lotion 1 Application(s) Topical two times a day  aspirin enteric coated 81 milliGRAM(s) Oral daily  atorvastatin 40 milliGRAM(s) Oral at bedtime  diltiazem    milliGRAM(s) Oral daily  docusate sodium 100 milliGRAM(s) Oral three times a day  enoxaparin Injectable 50 milliGRAM(s) SubCutaneous daily  ertapenem  IVPB      ertapenem  IVPB 500 milliGRAM(s) IV Intermittent every 24 hours  gabapentin 100 milliGRAM(s) Oral three times a day  glycerin Suppository - Adult 1 Suppository(s) Rectal daily  hydrALAZINE 50 milliGRAM(s) Oral three times a day  metoprolol succinate  milliGRAM(s) Oral daily  mirtazapine 7.5 milliGRAM(s) Oral at bedtime  pantoprazole    Tablet 40 milliGRAM(s) Oral before breakfast  polyethylene glycol 3350 17 Gram(s) Oral two times a day  senna 2 Tablet(s) Oral at bedtime    MEDICATIONS  (PRN):  acetaminophen   Tablet 650 milliGRAM(s) Oral every 8 hours PRN Mild Pain (1 - 3)      Allergies    No Known Allergies    Intolerances        Review of Systems:    General:  No wt loss, fevers, chills, night sweats, fatigue   Eyes:  Good vision, no reported pain  ENT:  No sore throat, pain, runny nose, dysphagia  CV:  No pain, palpitations, hypo/hypertension  Resp:  No dyspnea, cough, tachypnea, wheezing  GI:  No pain, No nausea, No vomiting, No diarrhea, No constipation, No weight loss, No fever, No pruritis, No rectal bleeding, No melena, No dysphagia  :  No pain, bleeding, incontinence, nocturia  Muscle:  No pain, weakness  Neuro:  No weakness, tingling, memory problems  Psych:  No fatigue, insomnia, mood problems, depression  Endocrine:  No polyuria, polydypsia, cold/heat intolerance  Heme:  No petechiae, ecchymosis, easy bruisability  Skin:  No rash, tattoos, scars, edema      Vital Signs Last 24 Hrs  T(C): 36.3 (01 Aug 2018 04:53), Max: 36.8 (31 Jul 2018 13:51)  T(F): 97.4 (01 Aug 2018 04:53), Max: 98.3 (31 Jul 2018 13:51)  HR: 82 (01 Aug 2018 04:53) (80 - 83)  BP: 153/85 (01 Aug 2018 04:53) (108/71 - 153/85)  BP(mean): --  RR: 17 (01 Aug 2018 04:53) (16 - 17)  SpO2: 95% (01 Aug 2018 04:53) (93% - 95%)    PHYSICAL EXAM:    Constitutional: NAD, lying in bed  HEENT: ncat  Neck: No LAD  Respiratory: dec bs  Cardiovascular: S1 and S2, RRR  Gastrointestinal: BS+, soft,  nt, nd  Extremities: No peripheral edema  Vascular: 2+ peripheral pulses  Neurological: Awake alert responds appropriately  Skin: No rashes      LABS:                        8.5    6.83  )-----------( 247      ( 01 Aug 2018 06:56 )             27.5     08-01    145  |  111<H>  |  38<H>  ----------------------------<  79  3.9   |  29  |  1.30    Ca    8.5      01 Aug 2018 06:56      PT/INR - ( 01 Aug 2018 06:56 )   PT: 14.8 sec;   INR: 1.35 ratio         PTT - ( 01 Aug 2018 06:56 )  PTT:36.5 sec      RADIOLOGY & ADDITIONAL TESTS:

## 2018-08-01 NOTE — PROGRESS NOTE ADULT - PROBLEM SELECTOR PROBLEM 2
Cholecystitis, unspecified
Normocytic anemia
Afib
Afib
UTI (urinary tract infection)
UTI (urinary tract infection)
Afib
UTI (urinary tract infection)

## 2018-08-01 NOTE — PROGRESS NOTE ADULT - PROBLEM SELECTOR PROBLEM 3
Normocytic anemia
Afib
Afib
Anemia
Anemia
Afib
Anemia
Afib

## 2018-08-01 NOTE — PROGRESS NOTE ADULT - ASSESSMENT
84 yo female w/ PMH of PAF on AC, CVA and hx of aneurysm repair, HTN, HLD, mitral valve prolapse, PAD, RA, HFPEF p/w urosepsis    - HIDA with no evidence of acute rox.  s/p ERCP.  s/p multiple stones removal and extraction of occluded stent.  Tolerated procedure well with no cardiac complications.  Per GI, patient is refusing lap rox.  Been asymptomatic  - Last echo 2/2018 with grossly normal LV systolic function and Diastolic dysfunction grade 2.  No need to repeat echo at this time.  - ECG with no evidence of ischemia.    - Monitor and replete electrolytes as necessary. Keep K>4.0 and Mg>2.0.   - HTN better controlled with one isolated elevation this am  Continue Hydralazine.  - h/p PAF, rate controlled.  Long acting dilt and Toprol started yesterday.  No evidence of bradycardia or tachycardia per flowsheet  - Dose Coumadin. Goal INR 2-3.  Needs Daily INRs.   INR remains subtherapeutic.    - She can go without being bridged with Lovenox.  - s/p PICC.  ABX per ID. Last dose in am and PICC line to be discontinue after abx.    - cont Aspirin   - Monitor H/H and for clinical signs of bleeding.   - PAD: currently disease is severe and not amenable to revascularization.   - Please continue to maintain strict I/Os, monitor daily weights, Cr.  Avoid nephrotoxic agents.    - Further cardiac workup will depend on clinical course.   - All other workup per primary team.  - Will cont to follow while inhouse

## 2018-08-01 NOTE — PROGRESS NOTE ADULT - SUBJECTIVE AND OBJECTIVE BOX
[facility] - Dahiana Bañuelos, Evens, Toney, Terrie, Filiberto Laughlin  Office Number:  [phone]    Patient resting comfortably in bed in NAD.  Laying flat with no respiratory distress.  No complaints of chest pain, dyspnea, palpitations, PND, or orthopnea.    F/U for:  atrial fibrillation, anticoagulation      MEDICATIONS  (STANDING):  ammonium lactate 12% Lotion 1 Application(s) Topical two times a day  aspirin enteric coated 81 milliGRAM(s) Oral daily  atorvastatin 40 milliGRAM(s) Oral at bedtime  diltiazem    milliGRAM(s) Oral daily  docusate sodium 100 milliGRAM(s) Oral three times a day  enoxaparin Injectable 50 milliGRAM(s) SubCutaneous daily  ertapenem  IVPB      ertapenem  IVPB 500 milliGRAM(s) IV Intermittent every 24 hours  gabapentin 100 milliGRAM(s) Oral three times a day  glycerin Suppository - Adult 1 Suppository(s) Rectal daily  hydrALAZINE 50 milliGRAM(s) Oral three times a day  metoprolol succinate  milliGRAM(s) Oral daily  mirtazapine 7.5 milliGRAM(s) Oral at bedtime  pantoprazole    Tablet 40 milliGRAM(s) Oral before breakfast  polyethylene glycol 3350 17 Gram(s) Oral two times a day  senna 2 Tablet(s) Oral at bedtime    MEDICATIONS  (PRN):  acetaminophen   Tablet 650 milliGRAM(s) Oral every 8 hours PRN Mild Pain (1 - 3)      Allergies    No Known Allergies    Intolerances        Vital Signs Last 24 Hrs  T(C): 36.3 (01 Aug 2018 04:53), Max: 36.8 (31 Jul 2018 13:51)  T(F): 97.4 (01 Aug 2018 04:53), Max: 98.3 (31 Jul 2018 13:51)  HR: 82 (01 Aug 2018 04:53) (80 - 83)  BP: 153/85 (01 Aug 2018 04:53) (108/71 - 153/85)  BP(mean): --  RR: 17 (01 Aug 2018 04:53) (16 - 17)  SpO2: 95% (01 Aug 2018 04:53) (93% - 95%)    I&O's Summary    31 Jul 2018 07:01  -  01 Aug 2018 07:00  --------------------------------------------------------  IN: 770 mL / OUT: 300 mL / NET: 470 mL    01 Aug 2018 07:01  -  01 Aug 2018 11:28  --------------------------------------------------------  IN: 360 mL / OUT: 0 mL / NET: 360 mL        ON EXAM:    General: NAD, awake and alert, oriented x 3  HEENT: Mucous membranes are moist, anicteric  Lungs: Non-labored, breath sounds are clear bilaterally, No wheezing, rales or rhonchi  Cardiovascular: Regular, S1 and S2, no murmurs, rubs, or gallops  Gastrointestinal: Bowel Sounds present, soft, nontender.   Lymph: No peripheral edema. No lymphadenopathy.  Skin: No rashes or ulcers  Psych:  Mood & affect appropriate      LABS: All Labs Reviewed:                        8.5    6.83  )-----------( 247      ( 01 Aug 2018 06:56 )             27.5                         9.1    6.66  )-----------( 254      ( 31 Jul 2018 07:12 )             30.1                         9.7    7.83  )-----------( 297      ( 30 Jul 2018 10:01 )             31.3     01 Aug 2018 06:56    145    |  111    |  38     ----------------------------<  79     3.9     |  29     |  1.30   31 Jul 2018 07:12    147    |  112    |  37     ----------------------------<  85     4.3     |  28     |  1.20   30 Jul 2018 10:01    143    |  110    |  34     ----------------------------<  111    3.9     |  28     |  1.20     Ca    8.5        01 Aug 2018 06:56  Ca    8.4        31 Jul 2018 07:12  Ca    8.6        30 Jul 2018 10:01      PT/INR - ( 01 Aug 2018 06:56 )   PT: 14.8 sec;   INR: 1.35 ratio         PTT - ( 01 Aug 2018 06:56 )  PTT:36.5 sec

## 2018-08-01 NOTE — CHART NOTE - NSCHARTNOTEFT_GEN_A_CORE
Assessment: 82 y/o female adm with sepsis. Pt states that she is eating well, no complaints. Pt states that she is also drinking well. Last BM 7/31.     Factors impacting intake: [x ] none [ ] nausea  [ ] vomiting [ ] diarrhea [ ] constipation  [ ]chewing problems [ ] swallowing issues  [ ] other:     Diet Presciption: Diet, DASH/TLC:   Sodium & Cholesterol Restricted (07-22-18 @ 11:10)    Intake: % (tray set up)    Current Weight: 8/1 110.6# adm 115#  % Weight Change    Pertinent Medications: MEDICATIONS  (STANDING):  ammonium lactate 12% Lotion 1 Application(s) Topical two times a day  aspirin enteric coated 81 milliGRAM(s) Oral daily  atorvastatin 40 milliGRAM(s) Oral at bedtime  diltiazem    milliGRAM(s) Oral daily  docusate sodium 100 milliGRAM(s) Oral three times a day  enoxaparin Injectable 50 milliGRAM(s) SubCutaneous daily  ertapenem  IVPB      ertapenem  IVPB 500 milliGRAM(s) IV Intermittent every 24 hours  gabapentin 100 milliGRAM(s) Oral three times a day  glycerin Suppository - Adult 1 Suppository(s) Rectal daily  hydrALAZINE 50 milliGRAM(s) Oral three times a day  metoprolol succinate  milliGRAM(s) Oral daily  mirtazapine 7.5 milliGRAM(s) Oral at bedtime  pantoprazole    Tablet 40 milliGRAM(s) Oral before breakfast  polyethylene glycol 3350 17 Gram(s) Oral two times a day  senna 2 Tablet(s) Oral at bedtime    MEDICATIONS  (PRN):  acetaminophen   Tablet 650 milliGRAM(s) Oral every 8 hours PRN Mild Pain (1 - 3)    Pertinent Labs: 08-01 Na145 mmol/L Glu 79 mg/dL K+ 3.9 mmol/L Cr  1.30 mg/dL BUN 38 mg/dL<H> 07-28 Alb 2.6 g/dL<L>     CAPILLARY BLOOD GLUCOSE        Skin: no pressure ulcers noted    Estimated Needs:   [x ] no change since previous assessment  [ ] recalculated:     Previous Nutrition Diagnosis:   [ ] Inadequate Energy Intake [ ]Inadequate Oral Intake [ ] Excessive Energy Intake   [ ] Underweight [ ] Increased Nutrient Needs [ ] Overweight/Obesity   [ ] Altered GI Function [ ] Unintended Weight Loss [x ] Food & Nutrition Related Knowledge Deficit [ ] Malnutrition     Nutrition Diagnosis is [ ] ongoing  [ ] resolved [ ] not applicable   [x] education has been provided; pt does not have any additional questions at this time.     New Nutrition Diagnosis: [x ] not applicable       Interventions:   Recommend  [ ] Change Diet To:  [ ] Nutrition Supplement  [ ] Nutrition Support  [x ] Other: continue DASH/TLC diet     Monitoring and Evaluation:   [x ] PO intake [ x ] Tolerance to diet prescription [ x ] weights [ x ] labs[ x ] follow up per protocol  [ ] other:

## 2018-08-01 NOTE — PROGRESS NOTE ADULT - PROBLEM SELECTOR PLAN 3
fobt neg  anemia likely multifactorial  no overt s/s gib, etiology of fluctuating hgb unclear  rec repeat h/h  no active bleeding seen on recent scope  serial cbcs, transfuse prn  cont ppi  consider iron studies/heme eval

## 2018-08-01 NOTE — PROGRESS NOTE ADULT - PROBLEM SELECTOR PLAN 2
see above  hida scan neg for acute rox  surgery following, pt/family refusing lap rox
see above  hida scan neg for acute rox  surgery following
see above  hida scan neg for acute rox  surgery following, pt/family refusing lap rox
see above  hida scan neg for acute rox  surgery following
anemia likely multifactorial  fobt pending  no overt s/s gib  serial cbcs, transfuse prn  cont ppi
see above  hida scan neg for acute rox  surgery following, pt/family refusing lap rox
- chronic, stable; monitor  - patient took coumadin on presentation 7/20  supratherapeutic today, cont hold dose today   - metoprolol tartrate 25 mg PO daily  - diltiazem 30 mg PO daily
- chronic, stable; monitor  - subtherapeutic inr, dose at 4mg, pt takes 3mg daily, recheck INR in AM, goal INR 2-3  - metoprolol tartrate 25 mg PO daily  - diltiazem 30 mg PO daily  apprec cardio recs
clinically improved   ESBL/Klebsiella  c/w iv abx.
clinically improved   ESBL/Klebsiella  c/w iv abx.
- chronic, stable; monitor  - resume coumadin dose 3mg daily as INR in therapeutic range; dose recently changed by outside provider to 3mg daily  - metoprolol tartrate 25 mg PO daily  - diltiazem 30 mg PO daily
clinically improved   ESBL/Klebsiella  c/w iv abx as per ID

## 2018-08-01 NOTE — PROGRESS NOTE ADULT - PROVIDER SPECIALTY LIST ADULT
Anesthesia
Cardiology
Gastroenterology
Hospitalist
Infectious Disease
Surgery
Cardiology
Hospitalist
Infectious Disease
Infectious Disease
Surgery
Cardiology
Cardiology
Gastroenterology
Hospitalist
Gastroenterology
Hospitalist
Hospitalist

## 2018-08-01 NOTE — PROVIDER CONTACT NOTE (OTHER) - SITUATION
DR Delong called to notify this writer that the diltiazem & metoprolol dosages were changed. pt already d/c to home.

## 2018-08-01 NOTE — CHART NOTE - NSCHARTNOTEFT_GEN_A_CORE
Patient seen and examined at bedside. Right arm PICC line region cleaned with Alcohol swabs prior to removal. PICC line removed and pressure applied for 5-10 minutes with gauze. Hemostasis achieved, no signs of active bleeding noted. Gauze/tape used to create pressure dressing to maintain pressure on PICC line site. Patient tolerated well without complications. Will continue to follow. RN to call/page if any changes.    2nd year Sandra Woodall MD supervised

## 2018-08-01 NOTE — PROGRESS NOTE ADULT - PROBLEM SELECTOR PROBLEM 1
Choledocholithiasis
Cholecystitis, unspecified
Choledocholithiasis
Bacteremia
Bacteremia
UTI (urinary tract infection)
UTI (urinary tract infection)
Bacteremia
UTI (urinary tract infection)
Choledocholithiasis
Bacteremia

## 2018-08-03 LAB — INR PPP: 1.38

## 2018-08-09 ENCOUNTER — APPOINTMENT (OUTPATIENT)
Dept: CARDIOLOGY | Facility: CLINIC | Age: 83
End: 2018-08-09
Payer: MEDICARE

## 2018-08-09 ENCOUNTER — NON-APPOINTMENT (OUTPATIENT)
Age: 83
End: 2018-08-09

## 2018-08-09 VITALS — BODY MASS INDEX: 22.09 KG/M2 | WEIGHT: 117 LBS | HEART RATE: 86 BPM | HEIGHT: 61 IN

## 2018-08-09 VITALS
HEIGHT: 61 IN | HEART RATE: 86 BPM | DIASTOLIC BLOOD PRESSURE: 83 MMHG | WEIGHT: 117 LBS | BODY MASS INDEX: 22.09 KG/M2 | OXYGEN SATURATION: 91 % | SYSTOLIC BLOOD PRESSURE: 147 MMHG

## 2018-08-09 LAB
INR PPP: 2.7 RATIO
QUALITY CONTROL: YES

## 2018-08-09 PROCEDURE — 99214 OFFICE O/P EST MOD 30 MIN: CPT

## 2018-08-09 PROCEDURE — 93000 ELECTROCARDIOGRAM COMPLETE: CPT

## 2018-08-09 PROCEDURE — 85610 PROTHROMBIN TIME: CPT | Mod: QW

## 2018-08-09 RX ORDER — METOPROLOL TARTRATE 25 MG/1
25 TABLET, FILM COATED ORAL
Qty: 270 | Refills: 3 | Status: DISCONTINUED | COMMUNITY
Start: 2018-03-06 | End: 2018-08-09

## 2018-08-09 RX ORDER — DILTIAZEM HYDROCHLORIDE 30 MG/1
30 TABLET ORAL
Qty: 270 | Refills: 1 | Status: DISCONTINUED | COMMUNITY
Start: 2018-03-06 | End: 2018-08-09

## 2018-08-27 LAB — INR PPP: 3.37

## 2018-09-04 ENCOUNTER — RX RENEWAL (OUTPATIENT)
Age: 83
End: 2018-09-04

## 2018-09-06 LAB — INR PPP: 2.44

## 2018-09-07 ENCOUNTER — MEDICATION RENEWAL (OUTPATIENT)
Age: 83
End: 2018-09-07

## 2018-09-17 ENCOUNTER — RX RENEWAL (OUTPATIENT)
Age: 83
End: 2018-09-17

## 2018-09-17 ENCOUNTER — MEDICATION RENEWAL (OUTPATIENT)
Age: 83
End: 2018-09-17

## 2018-09-27 ENCOUNTER — RESULT REVIEW (OUTPATIENT)
Age: 83
End: 2018-09-27

## 2018-09-28 LAB — INR PPP: 1.57

## 2018-10-12 VITALS — BODY MASS INDEX: 22.09 KG/M2 | HEIGHT: 61 IN | HEART RATE: 86 BPM | WEIGHT: 117 LBS

## 2018-10-12 LAB — INR PPP: 1.5

## 2018-10-24 PROCEDURE — C1751: CPT

## 2018-10-24 PROCEDURE — 87086 URINE CULTURE/COLONY COUNT: CPT

## 2018-10-24 PROCEDURE — 80076 HEPATIC FUNCTION PANEL: CPT

## 2018-10-24 PROCEDURE — 99285 EMERGENCY DEPT VISIT HI MDM: CPT | Mod: 25

## 2018-10-24 PROCEDURE — 87040 BLOOD CULTURE FOR BACTERIA: CPT

## 2018-10-24 PROCEDURE — 36569 INSJ PICC 5 YR+ W/O IMAGING: CPT

## 2018-10-24 PROCEDURE — 96374 THER/PROPH/DIAG INJ IV PUSH: CPT | Mod: XU

## 2018-10-24 PROCEDURE — 97530 THERAPEUTIC ACTIVITIES: CPT

## 2018-10-24 PROCEDURE — 82272 OCCULT BLD FECES 1-3 TESTS: CPT

## 2018-10-24 PROCEDURE — 97161 PT EVAL LOW COMPLEX 20 MIN: CPT

## 2018-10-24 PROCEDURE — 80048 BASIC METABOLIC PNL TOTAL CA: CPT

## 2018-10-24 PROCEDURE — C1889: CPT

## 2018-10-24 PROCEDURE — 85730 THROMBOPLASTIN TIME PARTIAL: CPT

## 2018-10-24 PROCEDURE — 74177 CT ABD & PELVIS W/CONTRAST: CPT

## 2018-10-24 PROCEDURE — 73630 X-RAY EXAM OF FOOT: CPT

## 2018-10-24 PROCEDURE — 87150 DNA/RNA AMPLIFIED PROBE: CPT

## 2018-10-24 PROCEDURE — 36415 COLL VENOUS BLD VENIPUNCTURE: CPT

## 2018-10-24 PROCEDURE — 96376 TX/PRO/DX INJ SAME DRUG ADON: CPT

## 2018-10-24 PROCEDURE — 83605 ASSAY OF LACTIC ACID: CPT

## 2018-10-24 PROCEDURE — 76001: CPT

## 2018-10-24 PROCEDURE — 76705 ECHO EXAM OF ABDOMEN: CPT

## 2018-10-24 PROCEDURE — 84480 ASSAY TRIIODOTHYRONINE (T3): CPT

## 2018-10-24 PROCEDURE — 84443 ASSAY THYROID STIM HORMONE: CPT

## 2018-10-24 PROCEDURE — 85610 PROTHROMBIN TIME: CPT

## 2018-10-24 PROCEDURE — 80053 COMPREHEN METABOLIC PANEL: CPT

## 2018-10-24 PROCEDURE — 87633 RESP VIRUS 12-25 TARGETS: CPT

## 2018-10-24 PROCEDURE — 97116 GAIT TRAINING THERAPY: CPT

## 2018-10-24 PROCEDURE — 76937 US GUIDE VASCULAR ACCESS: CPT

## 2018-10-24 PROCEDURE — 82248 BILIRUBIN DIRECT: CPT

## 2018-10-24 PROCEDURE — 87486 CHLMYD PNEUM DNA AMP PROBE: CPT

## 2018-10-24 PROCEDURE — 87186 SC STD MICRODIL/AGAR DIL: CPT

## 2018-10-24 PROCEDURE — C1769: CPT

## 2018-10-24 PROCEDURE — 93005 ELECTROCARDIOGRAM TRACING: CPT

## 2018-10-24 PROCEDURE — 77001 FLUOROGUIDE FOR VEIN DEVICE: CPT

## 2018-10-24 PROCEDURE — 85014 HEMATOCRIT: CPT

## 2018-10-24 PROCEDURE — 85018 HEMOGLOBIN: CPT

## 2018-10-24 PROCEDURE — 85027 COMPLETE CBC AUTOMATED: CPT

## 2018-10-24 PROCEDURE — 87581 M.PNEUMON DNA AMP PROBE: CPT

## 2018-10-24 PROCEDURE — 84145 PROCALCITONIN (PCT): CPT

## 2018-10-24 PROCEDURE — A9537: CPT

## 2018-10-24 PROCEDURE — 88300 SURGICAL PATH GROSS: CPT

## 2018-10-24 PROCEDURE — 74018 RADEX ABDOMEN 1 VIEW: CPT

## 2018-10-24 PROCEDURE — 71045 X-RAY EXAM CHEST 1 VIEW: CPT

## 2018-10-24 PROCEDURE — 87798 DETECT AGENT NOS DNA AMP: CPT

## 2018-10-24 PROCEDURE — 81001 URINALYSIS AUTO W/SCOPE: CPT

## 2018-10-24 PROCEDURE — 84436 ASSAY OF TOTAL THYROXINE: CPT

## 2018-10-24 PROCEDURE — 78226 HEPATOBILIARY SYSTEM IMAGING: CPT

## 2018-10-25 LAB — INR PPP: 5.29

## 2018-10-29 ENCOUNTER — RX RENEWAL (OUTPATIENT)
Age: 83
End: 2018-10-29

## 2018-10-30 LAB — INR PPP: 3.92

## 2018-11-07 LAB
INR PPP: 1.95
PT BLD: 21.9

## 2018-11-20 LAB — INR PPP: 1.09

## 2018-11-26 ENCOUNTER — NON-APPOINTMENT (OUTPATIENT)
Age: 83
End: 2018-11-26

## 2018-11-26 ENCOUNTER — APPOINTMENT (OUTPATIENT)
Dept: CARDIOLOGY | Facility: CLINIC | Age: 83
End: 2018-11-26
Payer: MEDICARE

## 2018-11-26 VITALS
HEIGHT: 61 IN | HEART RATE: 67 BPM | WEIGHT: 110 LBS | DIASTOLIC BLOOD PRESSURE: 73 MMHG | BODY MASS INDEX: 20.77 KG/M2 | OXYGEN SATURATION: 91 % | SYSTOLIC BLOOD PRESSURE: 138 MMHG

## 2018-11-26 VITALS — DIASTOLIC BLOOD PRESSURE: 60 MMHG | SYSTOLIC BLOOD PRESSURE: 110 MMHG

## 2018-11-26 PROCEDURE — 93000 ELECTROCARDIOGRAM COMPLETE: CPT

## 2018-11-26 PROCEDURE — 99214 OFFICE O/P EST MOD 30 MIN: CPT

## 2018-11-26 RX ORDER — FUROSEMIDE 20 MG/1
20 TABLET ORAL
Qty: 30 | Refills: 1 | Status: DISCONTINUED | COMMUNITY
Start: 2018-09-18 | End: 2018-11-26

## 2018-11-26 NOTE — REVIEW OF SYSTEMS
[Headache] : no headache [Feeling Fatigued] : feeling fatigued [Shortness Of Breath] : no shortness of breath [Dyspnea on exertion] : not dyspnea during exertion [Chest  Pressure] : no chest pressure [Chest Pain] : no chest pain [Lower Ext Edema] : no extremity edema [Leg Claudication] : no intermittent leg claudication [Palpitations] : no palpitations [Change In The Stool] : no change in stool [Joint Pain] : joint pain [Joint Stiffness] : joint stiffness [see HPI] : see HPI [Negative] : Heme/Lymph

## 2018-11-26 NOTE — REASON FOR VISIT
[Follow-Up - From Hospitalization] : follow-up of a recent hospitalization for [Anticoagulation] : anticoagulation [Atrial Fibrillation] : atrial fibrillation [Dyspnea] : dyspnea [Peripheral Vascular Disease] : peripheral vascular disease

## 2018-11-26 NOTE — HISTORY OF PRESENT ILLNESS
[FreeTextEntry1] : 83 year-old woman with a history of hypercholesterolemia, hypertension, rheumatoid arthritis, cerebral aneurysm status post clipping, PAD status post femoropopliteal bypass with subsequent right leg femoropopliteal bypass thrombosis and infected wound, redo bypass with failure, then PCI with failure, paroxysmal atrial fibrillation previously on AC with Eliquis which was converted to Coumadin after an episode of UGIB. She had admission to [facility] where she had significant abdominal pain requiring a emergent laparotomy and reduction of closed loop  with short segment of SB excised. her course was complicated by rapid atrial fibrillation and mild heart failure. He was also complicated by bleeding from the anastomosis site in the setting of an increased INR. She was stabilized and then subsequently sent to rehabilitation. Then she had a fall with a hip fracture from a mechanical fall s/p ORIF. She is now walking with a walker.\par In 11/2017 she was hospitalized for a gangrenous right toe with possible signs of osteomyelitis (no MR 2/2 brain clippings). He had a long and complicated hospital coarse. Podiatry recommended a amputation. An IR angiogram showed multilevel occlusions involving the right common femoral artery, the \par superficial femoral artery and popliteal artery with collateral vessels feeding single vessel below knee. Another bypass was considered to high risk and likely to have poor outcomes. She was offered an AKA but refused. She eneded up getting an amputation of her toe. Her course was complicated by anemia and also multiple episodes of AF with RVR. \par She was readmitted to  for for significant diarrhea leading to shock and respiratory failure. She was significantly bradycardic requiring a TVP. Once her metabolic derangement improved she returned to SR with intermittent PAF.   \par \par  she was admitted to  in summer on 2018 for a prolong stay for  a UTI and infected gallstone requiring an ERCP. \par \par She now presents for a followup visit. \par Since her last visit, she is complaining of feeling tired. She states that it is most apparent in AM and then dissipates over time. \par her main complaints are of pain from her arthritis. \par She is walking in the house with the walker and not having much an issues. No claudication symptoms.  \par  \par She denies any blurry vision, headaches or recent stroke like symptoms. \par She   denies any chest pain, dyspnea, PND, orthopnea, near syncope, syncope, strokelike symptoms. Her lower extremity has resolved. \par She is compliant with her medications. \par No reported melena, hematochezia or hematemesis.  \par \par Her blood pressure range at home are reportedly normal.

## 2018-11-26 NOTE — DISCUSSION/SUMMARY
[FreeTextEntry1] : 83-year-old woman with a history as listed above who presents today for a followup cardiac evaluation.\par Celia is doing remarkably well . She appears currently stable from a cardiovascular standpoint. She denies any anginal symptoms. Clinically she is euvolemic and not in heart failure. Her EKG shows that she is maintaining sinus rhythm. Her EKG did not reveal any significant ischemic changes. \par \par Her blood pressure is  controlled.   Her HR is controlled. I will continue her Hydralazine 50mg q8. She will continue with  Diltizam 120mg Qday. She is feeling more faitgue which I think is multifactorial and likely from her comorbidities. She will switch her Toprol 100mg to HS. \par \par Her PAD is stable. Though she is now having rest pain and leg fatigue which is likely a function of her activities increasing in the setting of her severe PAD. Her wounds have healed.  If she gets any further gangrenous issues on the right foot she may be forced to have an AKA in the future. She has virtually no targets for another bypass. Followup with Dr. Araya. I have encouraged more walking. Unfortunately more antiplatelet therapy is not advised given increased risk of bleeding. She will continue with aspirin 81 mg daily.\par \par She will continue with Coumadin for stroke risk reduction. She will monitor for signs of bleeding.  We'll try to keep her INR between 2-3. She will monitor for signs of GIB. \par \par  She will continue Lipitor 40 mg daily. Her goal LDL is <70. \par \par  Her last echo in the hospital did not show any severe valvular disease. \par \par She will followup with me in 3-4 month  time. She will followup with you for further medical needs.

## 2018-11-26 NOTE — PHYSICAL EXAM
[Normal Appearance] : normal appearance [General Appearance - In No Acute Distress] : no acute distress [Normal Conjunctiva] : the conjunctiva exhibited no abnormalities [Normal Oral Mucosa] : normal oral mucosa [No Oral Pallor] : no oral pallor [Normal Jugular Venous V Waves Present] : normal jugular venous V waves present [Respiration, Rhythm And Depth] : normal respiratory rhythm and effort [Exaggerated Use Of Accessory Muscles For Inspiration] : no accessory muscle use [Auscultation Breath Sounds / Voice Sounds] : lungs were clear to auscultation bilaterally [Bowel Sounds] : normal bowel sounds [Abdomen Soft] : soft [Abdomen Tenderness] : non-tender [FreeTextEntry1] : swan neck deformities in hands [] : no rash [Skin Lesions] : no skin lesions [Oriented To Time, Place, And Person] : oriented to person, place, and time [Affect] : the affect was normal [Mood] : the mood was normal [Normal Rate] : normal [Rhythm Regular] : regular [Normal S1] : normal S1 [Normal S2] : normal S2 [No Gallop] : no gallop heard [I] : a grade 1 [Right Carotid Bruit] : no bruit heard over the right carotid [Left Carotid Bruit] : no bruit heard over the left carotid [0] : right 0 [1+] : left 1+ [No Pitting Edema] : no pitting edema present [Rt] : varicose veins of the right leg noted [Lt] : varicose veins of the left leg noted

## 2018-11-26 NOTE — CARDIOLOGY SUMMARY
[No Ischemia] : no Ischemia [___] : [unfilled] [None] : normal LV function [Mild] : mild mitral regurgitation

## 2018-11-28 LAB
INR PPP: 1.44
PT BLD: 16.2

## 2018-12-06 LAB — INR PPP: 1.6

## 2018-12-19 ENCOUNTER — RESULT REVIEW (OUTPATIENT)
Age: 83
End: 2018-12-19

## 2018-12-19 LAB — INR PPP: 4.03

## 2018-12-26 LAB — INR PPP: 2.18

## 2019-01-02 NOTE — PROGRESS NOTE ADULT - PROBLEM SELECTOR PLAN 9
Med: Norco  Dosage: 5/325 mg  Si q 6 hrs prn  Quantity requested:        Mail Order: no    Preferred pharmacy has been set up and verified. chronic. Currently controlled  continue with metoprolol, amlodipine, losartan with parameters  continue to monitor BP  DASH diet - npo since midnight

## 2019-01-09 ENCOUNTER — RESULT REVIEW (OUTPATIENT)
Age: 84
End: 2019-01-09

## 2019-01-10 LAB — INR PPP: 3.11

## 2019-01-10 NOTE — PROGRESS NOTE ADULT - ATTENDING COMMENTS
atrial fibrillation 82F PMHx chronic Afib on Coumadin, HLD, CVA, MVP, OA, HTN, PVD (s/p RLE bypassx3, most recently 2012), Neuropathy, GERD presents with ischemic ulcer of R foot 2nd digit. Incidentally found w/ nonobstructing hepatic duct stone. Plan: adjust dose of gabapentin as poss neuropathic pain exacerbation sec to recent surgery, apprec podiatry and PT eval recs, monitor clinical course, pain control

## 2019-01-13 ENCOUNTER — INPATIENT (INPATIENT)
Facility: HOSPITAL | Age: 84
LOS: 2 days | Discharge: HOME | DRG: 392 | End: 2019-01-16
Attending: HOSPITALIST | Admitting: HOSPITALIST
Payer: MEDICARE

## 2019-01-13 VITALS
TEMPERATURE: 98 F | HEART RATE: 90 BPM | RESPIRATION RATE: 15 BRPM | DIASTOLIC BLOOD PRESSURE: 72 MMHG | WEIGHT: 110.01 LBS | OXYGEN SATURATION: 97 % | HEIGHT: 62 IN | SYSTOLIC BLOOD PRESSURE: 162 MMHG

## 2019-01-13 DIAGNOSIS — I48.91 UNSPECIFIED ATRIAL FIBRILLATION: ICD-10-CM

## 2019-01-13 DIAGNOSIS — Z98.49 CATARACT EXTRACTION STATUS, UNSPECIFIED EYE: Chronic | ICD-10-CM

## 2019-01-13 DIAGNOSIS — D64.9 ANEMIA, UNSPECIFIED: ICD-10-CM

## 2019-01-13 DIAGNOSIS — I71.4 ABDOMINAL AORTIC ANEURYSM, WITHOUT RUPTURE: ICD-10-CM

## 2019-01-13 DIAGNOSIS — N20.0 CALCULUS OF KIDNEY: Chronic | ICD-10-CM

## 2019-01-13 DIAGNOSIS — I73.9 PERIPHERAL VASCULAR DISEASE, UNSPECIFIED: ICD-10-CM

## 2019-01-13 DIAGNOSIS — Z98.89 OTHER SPECIFIED POSTPROCEDURAL STATES: Chronic | ICD-10-CM

## 2019-01-13 DIAGNOSIS — E78.5 HYPERLIPIDEMIA, UNSPECIFIED: ICD-10-CM

## 2019-01-13 DIAGNOSIS — Z96.7 PRESENCE OF OTHER BONE AND TENDON IMPLANTS: Chronic | ICD-10-CM

## 2019-01-13 DIAGNOSIS — Z41.9 ENCOUNTER FOR PROCEDURE FOR PURPOSES OTHER THAN REMEDYING HEALTH STATE, UNSPECIFIED: Chronic | ICD-10-CM

## 2019-01-13 DIAGNOSIS — K52.9 NONINFECTIVE GASTROENTERITIS AND COLITIS, UNSPECIFIED: ICD-10-CM

## 2019-01-13 DIAGNOSIS — Z29.9 ENCOUNTER FOR PROPHYLACTIC MEASURES, UNSPECIFIED: ICD-10-CM

## 2019-01-13 DIAGNOSIS — I10 ESSENTIAL (PRIMARY) HYPERTENSION: ICD-10-CM

## 2019-01-13 LAB
ALBUMIN SERPL ELPH-MCNC: 3.1 G/DL — LOW (ref 3.3–5)
ALP SERPL-CCNC: 75 U/L — SIGNIFICANT CHANGE UP (ref 40–120)
ALT FLD-CCNC: 24 U/L — SIGNIFICANT CHANGE UP (ref 12–78)
ANION GAP SERPL CALC-SCNC: 12 MMOL/L — SIGNIFICANT CHANGE UP (ref 5–17)
APPEARANCE UR: ABNORMAL
APTT BLD: 44.3 SEC — HIGH (ref 28.5–37)
AST SERPL-CCNC: 18 U/L — SIGNIFICANT CHANGE UP (ref 15–37)
BACTERIA # UR AUTO: ABNORMAL
BASOPHILS # BLD AUTO: 0.02 K/UL — SIGNIFICANT CHANGE UP (ref 0–0.2)
BASOPHILS NFR BLD AUTO: 0.1 % — SIGNIFICANT CHANGE UP (ref 0–2)
BILIRUB SERPL-MCNC: 0.8 MG/DL — SIGNIFICANT CHANGE UP (ref 0.2–1.2)
BILIRUB UR-MCNC: NEGATIVE — SIGNIFICANT CHANGE UP
BUN SERPL-MCNC: 34 MG/DL — HIGH (ref 7–23)
CALCIUM SERPL-MCNC: 8.4 MG/DL — LOW (ref 8.5–10.1)
CHLORIDE SERPL-SCNC: 107 MMOL/L — SIGNIFICANT CHANGE UP (ref 96–108)
CO2 SERPL-SCNC: 24 MMOL/L — SIGNIFICANT CHANGE UP (ref 22–31)
COLOR SPEC: YELLOW — SIGNIFICANT CHANGE UP
COMMENT - URINE: SIGNIFICANT CHANGE UP
CREAT SERPL-MCNC: 1.3 MG/DL — SIGNIFICANT CHANGE UP (ref 0.5–1.3)
DIFF PNL FLD: ABNORMAL
EOSINOPHIL # BLD AUTO: 0 K/UL — SIGNIFICANT CHANGE UP (ref 0–0.5)
EOSINOPHIL NFR BLD AUTO: 0 % — SIGNIFICANT CHANGE UP (ref 0–6)
EPI CELLS # UR: SIGNIFICANT CHANGE UP
GLUCOSE SERPL-MCNC: 125 MG/DL — HIGH (ref 70–99)
GLUCOSE UR QL: NEGATIVE — SIGNIFICANT CHANGE UP
HCT VFR BLD CALC: 27.6 % — LOW (ref 34.5–45)
HGB BLD-MCNC: 8.4 G/DL — LOW (ref 11.5–15.5)
IMM GRANULOCYTES NFR BLD AUTO: 0.5 % — SIGNIFICANT CHANGE UP (ref 0–1.5)
INR BLD: 4.8 RATIO — HIGH (ref 0.88–1.16)
KETONES UR-MCNC: ABNORMAL
LEUKOCYTE ESTERASE UR-ACNC: ABNORMAL
LIDOCAIN IGE QN: 75 U/L — SIGNIFICANT CHANGE UP (ref 73–393)
LYMPHOCYTES # BLD AUTO: 0.76 K/UL — LOW (ref 1–3.3)
LYMPHOCYTES # BLD AUTO: 5.5 % — LOW (ref 13–44)
MCHC RBC-ENTMCNC: 25.4 PG — LOW (ref 27–34)
MCHC RBC-ENTMCNC: 30.4 GM/DL — LOW (ref 32–36)
MCV RBC AUTO: 83.4 FL — SIGNIFICANT CHANGE UP (ref 80–100)
MONOCYTES # BLD AUTO: 0.4 K/UL — SIGNIFICANT CHANGE UP (ref 0–0.9)
MONOCYTES NFR BLD AUTO: 2.9 % — SIGNIFICANT CHANGE UP (ref 2–14)
NEUTROPHILS # BLD AUTO: 12.56 K/UL — HIGH (ref 1.8–7.4)
NEUTROPHILS NFR BLD AUTO: 91 % — HIGH (ref 43–77)
NITRITE UR-MCNC: POSITIVE
NRBC # BLD: 0 /100 WBCS — SIGNIFICANT CHANGE UP (ref 0–0)
OB PNL STL: NEGATIVE — SIGNIFICANT CHANGE UP
PH UR: 7 — SIGNIFICANT CHANGE UP (ref 5–8)
PLATELET # BLD AUTO: 277 K/UL — SIGNIFICANT CHANGE UP (ref 150–400)
POTASSIUM SERPL-MCNC: 3.8 MMOL/L — SIGNIFICANT CHANGE UP (ref 3.5–5.3)
POTASSIUM SERPL-SCNC: 3.8 MMOL/L — SIGNIFICANT CHANGE UP (ref 3.5–5.3)
PROT SERPL-MCNC: 7 G/DL — SIGNIFICANT CHANGE UP (ref 6–8.3)
PROT UR-MCNC: 75 MG/DL
PROTHROM AB SERPL-ACNC: 57.4 SEC — HIGH (ref 10–12.9)
RBC # BLD: 3.31 M/UL — LOW (ref 3.8–5.2)
RBC # FLD: 17.9 % — HIGH (ref 10.3–14.5)
RBC CASTS # UR COMP ASSIST: SIGNIFICANT CHANGE UP /HPF (ref 0–4)
SODIUM SERPL-SCNC: 143 MMOL/L — SIGNIFICANT CHANGE UP (ref 135–145)
SP GR SPEC: 1.01 — SIGNIFICANT CHANGE UP (ref 1.01–1.02)
UROBILINOGEN FLD QL: NEGATIVE — SIGNIFICANT CHANGE UP
WBC # BLD: 13.81 K/UL — HIGH (ref 3.8–10.5)
WBC # FLD AUTO: 13.81 K/UL — HIGH (ref 3.8–10.5)
WBC UR QL: ABNORMAL

## 2019-01-13 PROCEDURE — 93010 ELECTROCARDIOGRAM REPORT: CPT

## 2019-01-13 PROCEDURE — 99285 EMERGENCY DEPT VISIT HI MDM: CPT

## 2019-01-13 PROCEDURE — 74176 CT ABD & PELVIS W/O CONTRAST: CPT | Mod: 26

## 2019-01-13 PROCEDURE — 99223 1ST HOSP IP/OBS HIGH 75: CPT | Mod: GC,AI

## 2019-01-13 RX ORDER — HYDROMORPHONE HYDROCHLORIDE 2 MG/ML
0.5 INJECTION INTRAMUSCULAR; INTRAVENOUS; SUBCUTANEOUS EVERY 4 HOURS
Qty: 0 | Refills: 0 | Status: DISCONTINUED | OUTPATIENT
Start: 2019-01-13 | End: 2019-01-16

## 2019-01-13 RX ORDER — CIPROFLOXACIN LACTATE 400MG/40ML
200 VIAL (ML) INTRAVENOUS EVERY 12 HOURS
Qty: 0 | Refills: 0 | Status: DISCONTINUED | OUTPATIENT
Start: 2019-01-13 | End: 2019-01-15

## 2019-01-13 RX ORDER — ONDANSETRON 8 MG/1
4 TABLET, FILM COATED ORAL EVERY 6 HOURS
Qty: 0 | Refills: 0 | Status: DISCONTINUED | OUTPATIENT
Start: 2019-01-13 | End: 2019-01-16

## 2019-01-13 RX ORDER — ONDANSETRON 8 MG/1
4 TABLET, FILM COATED ORAL ONCE
Qty: 0 | Refills: 0 | Status: COMPLETED | OUTPATIENT
Start: 2019-01-13 | End: 2019-01-13

## 2019-01-13 RX ORDER — ATORVASTATIN CALCIUM 80 MG/1
40 TABLET, FILM COATED ORAL AT BEDTIME
Qty: 0 | Refills: 0 | Status: DISCONTINUED | OUTPATIENT
Start: 2019-01-13 | End: 2019-01-16

## 2019-01-13 RX ORDER — SODIUM CHLORIDE 9 MG/ML
1000 INJECTION INTRAMUSCULAR; INTRAVENOUS; SUBCUTANEOUS
Qty: 0 | Refills: 0 | Status: DISCONTINUED | OUTPATIENT
Start: 2019-01-13 | End: 2019-01-14

## 2019-01-13 RX ORDER — AMLODIPINE BESYLATE 2.5 MG/1
1 TABLET ORAL
Qty: 0 | Refills: 0 | COMMUNITY

## 2019-01-13 RX ORDER — WARFARIN SODIUM 2.5 MG/1
0 TABLET ORAL
Qty: 0 | Refills: 0 | COMMUNITY

## 2019-01-13 RX ORDER — SODIUM CHLORIDE 9 MG/ML
1000 INJECTION INTRAMUSCULAR; INTRAVENOUS; SUBCUTANEOUS ONCE
Qty: 0 | Refills: 0 | Status: COMPLETED | OUTPATIENT
Start: 2019-01-13 | End: 2019-01-13

## 2019-01-13 RX ORDER — GABAPENTIN 400 MG/1
300 CAPSULE ORAL THREE TIMES A DAY
Qty: 0 | Refills: 0 | Status: DISCONTINUED | OUTPATIENT
Start: 2019-01-13 | End: 2019-01-15

## 2019-01-13 RX ORDER — ACETAMINOPHEN 500 MG
650 TABLET ORAL EVERY 6 HOURS
Qty: 0 | Refills: 0 | Status: DISCONTINUED | OUTPATIENT
Start: 2019-01-13 | End: 2019-01-16

## 2019-01-13 RX ORDER — HYDRALAZINE HCL 50 MG
50 TABLET ORAL THREE TIMES A DAY
Qty: 0 | Refills: 0 | Status: DISCONTINUED | OUTPATIENT
Start: 2019-01-13 | End: 2019-01-16

## 2019-01-13 RX ORDER — ASPIRIN/CALCIUM CARB/MAGNESIUM 324 MG
81 TABLET ORAL DAILY
Qty: 0 | Refills: 0 | Status: DISCONTINUED | OUTPATIENT
Start: 2019-01-13 | End: 2019-01-16

## 2019-01-13 RX ORDER — MORPHINE SULFATE 50 MG/1
2 CAPSULE, EXTENDED RELEASE ORAL EVERY 4 HOURS
Qty: 0 | Refills: 0 | Status: DISCONTINUED | OUTPATIENT
Start: 2019-01-13 | End: 2019-01-16

## 2019-01-13 RX ORDER — GABAPENTIN 400 MG/1
0 CAPSULE ORAL
Qty: 0 | Refills: 0 | COMMUNITY

## 2019-01-13 RX ORDER — MORPHINE SULFATE 50 MG/1
4 CAPSULE, EXTENDED RELEASE ORAL ONCE
Qty: 0 | Refills: 0 | Status: DISCONTINUED | OUTPATIENT
Start: 2019-01-13 | End: 2019-01-13

## 2019-01-13 RX ORDER — CIPROFLOXACIN LACTATE 400MG/40ML
200 VIAL (ML) INTRAVENOUS ONCE
Qty: 0 | Refills: 0 | Status: COMPLETED | OUTPATIENT
Start: 2019-01-13 | End: 2019-01-13

## 2019-01-13 RX ORDER — LOSARTAN POTASSIUM 100 MG/1
1 TABLET, FILM COATED ORAL
Qty: 0 | Refills: 0 | COMMUNITY

## 2019-01-13 RX ORDER — DILTIAZEM HCL 120 MG
120 CAPSULE, EXT RELEASE 24 HR ORAL DAILY
Qty: 0 | Refills: 0 | Status: DISCONTINUED | OUTPATIENT
Start: 2019-01-13 | End: 2019-01-16

## 2019-01-13 RX ORDER — METOPROLOL TARTRATE 50 MG
100 TABLET ORAL DAILY
Qty: 0 | Refills: 0 | Status: DISCONTINUED | OUTPATIENT
Start: 2019-01-13 | End: 2019-01-16

## 2019-01-13 RX ORDER — METRONIDAZOLE 500 MG
500 TABLET ORAL ONCE
Qty: 0 | Refills: 0 | Status: COMPLETED | OUTPATIENT
Start: 2019-01-13 | End: 2019-01-13

## 2019-01-13 RX ORDER — TRAMADOL HYDROCHLORIDE 50 MG/1
0 TABLET ORAL
Qty: 0 | Refills: 0 | COMMUNITY

## 2019-01-13 RX ORDER — WARFARIN SODIUM 2.5 MG/1
1 TABLET ORAL
Qty: 0 | Refills: 0 | COMMUNITY

## 2019-01-13 RX ORDER — METRONIDAZOLE 500 MG
500 TABLET ORAL EVERY 8 HOURS
Qty: 0 | Refills: 0 | Status: DISCONTINUED | OUTPATIENT
Start: 2019-01-13 | End: 2019-01-15

## 2019-01-13 RX ADMIN — Medication 100 MILLIGRAM(S): at 21:40

## 2019-01-13 RX ADMIN — Medication 100 MILLIGRAM(S): at 13:56

## 2019-01-13 RX ADMIN — Medication 100 MILLIGRAM(S): at 23:45

## 2019-01-13 RX ADMIN — GABAPENTIN 300 MILLIGRAM(S): 400 CAPSULE ORAL at 21:38

## 2019-01-13 RX ADMIN — ONDANSETRON 4 MILLIGRAM(S): 8 TABLET, FILM COATED ORAL at 08:45

## 2019-01-13 RX ADMIN — SODIUM CHLORIDE 1000 MILLILITER(S): 9 INJECTION INTRAMUSCULAR; INTRAVENOUS; SUBCUTANEOUS at 10:50

## 2019-01-13 RX ADMIN — SODIUM CHLORIDE 1000 MILLILITER(S): 9 INJECTION INTRAMUSCULAR; INTRAVENOUS; SUBCUTANEOUS at 12:42

## 2019-01-13 RX ADMIN — MORPHINE SULFATE 4 MILLIGRAM(S): 50 CAPSULE, EXTENDED RELEASE ORAL at 09:00

## 2019-01-13 RX ADMIN — SODIUM CHLORIDE 75 MILLILITER(S): 9 INJECTION INTRAMUSCULAR; INTRAVENOUS; SUBCUTANEOUS at 13:41

## 2019-01-13 RX ADMIN — Medication 50 MILLIGRAM(S): at 13:56

## 2019-01-13 RX ADMIN — HYDROMORPHONE HYDROCHLORIDE 0.5 MILLIGRAM(S): 2 INJECTION INTRAMUSCULAR; INTRAVENOUS; SUBCUTANEOUS at 13:38

## 2019-01-13 RX ADMIN — Medication 81 MILLIGRAM(S): at 13:56

## 2019-01-13 RX ADMIN — ATORVASTATIN CALCIUM 40 MILLIGRAM(S): 80 TABLET, FILM COATED ORAL at 21:38

## 2019-01-13 RX ADMIN — Medication 100 MILLIGRAM(S): at 11:56

## 2019-01-13 RX ADMIN — MORPHINE SULFATE 4 MILLIGRAM(S): 50 CAPSULE, EXTENDED RELEASE ORAL at 08:45

## 2019-01-13 RX ADMIN — SODIUM CHLORIDE 1000 MILLILITER(S): 9 INJECTION INTRAMUSCULAR; INTRAVENOUS; SUBCUTANEOUS at 08:45

## 2019-01-13 RX ADMIN — Medication 120 MILLIGRAM(S): at 13:56

## 2019-01-13 RX ADMIN — Medication 50 MILLIGRAM(S): at 21:38

## 2019-01-13 RX ADMIN — HYDROMORPHONE HYDROCHLORIDE 0.5 MILLIGRAM(S): 2 INJECTION INTRAMUSCULAR; INTRAVENOUS; SUBCUTANEOUS at 13:55

## 2019-01-13 RX ADMIN — Medication 100 MILLIGRAM(S): at 10:51

## 2019-01-13 RX ADMIN — GABAPENTIN 300 MILLIGRAM(S): 400 CAPSULE ORAL at 13:56

## 2019-01-13 NOTE — PATIENT PROFILE ADULT - FALL HARM RISK
other/wEAKNESS/bones(Osteoporosis,prev fx,steroid use,metastatic bone ca)/coagulation(Bleeding disorder R/T clinical cond/anti-coags)

## 2019-01-13 NOTE — PATIENT PROFILE ADULT - VISION (WITH CORRECTIVE LENSES IF THE PATIENT USUALLY WEARS THEM):
wears eyeglasses for reading. did not bring in  hospital/Partially impaired: cannot see medication labels or newsprint, but can see obstacles in path, and the surrounding layout; can count fingers at arm's length

## 2019-01-13 NOTE — ED PROVIDER NOTE - OBJECTIVE STATEMENT
pt bib ems for abd pain, vomiting and diarrhea since yest. no fevers, chills, ha, cp, sob, dysuria. no blood in stool or emesis  pmd - crafa

## 2019-01-13 NOTE — H&P ADULT - PROBLEM SELECTOR PLAN 8
-Patient on warfarin at home  -SCDs -Patient on warfarin at home, hold now for supratherapeutic INR   -SCDs  -PT eval

## 2019-01-13 NOTE — H&P ADULT - NSHPSOCIALHISTORY_GEN_ALL_CORE
Lives at home (separate apartment in [place]), ambulates with walker, does all ADLs, two daughters and son close by to help if needed, health aide that comes for 7 hours a day/ 7 days a week  Prior smoker, patient couldn't quantify packs/day x years but said it was substantial. Patient quit 20 years ago   Occasional alcohol    Patient had colonoscopy 3 years ago, normal.

## 2019-01-13 NOTE — H&P ADULT - PROBLEM SELECTOR PLAN 6
-c/w asa 81mg QD and gabapentin 100mg TID for neuropathy  -chronic, right femoral bypass graft present on CT abdomen and pelvis

## 2019-01-13 NOTE — H&P ADULT - NSHPPHYSICALEXAM_GEN_ALL_CORE
Physical Exam  General: No apparent distress, dehydrated   Head: normocephalic, atraumatic  Eyes: EOMI, anicteric, R eye reactive to light, L eye blown pupil not reactive to light   ENT: dry mucous membranes, no pharyngeal exudates  Heart: irregularly irregular, S1, S2, no murmurs  Chest: CTA b/l, no rales, rhonchi, or wheezes  Abd: hypoactive BS+, soft, slight tenderness to palpation b/l lower quadrants, ND  Back: no CVA tenderness  Extr: no edema or cyanosis, <2 secs cap refill  Neuro: AA&Ox3, no focal weakness, sensation to light touch intact  Psych: normal affect

## 2019-01-13 NOTE — ED ADULT NURSE NOTE - NSIMPLEMENTINTERV_GEN_ALL_ED
Implemented All Fall with Harm Risk Interventions:  [address] to call system. Call bell, personal items and telephone within reach. Instruct patient to call for assistance. Room bathroom lighting operational. Non-slip footwear when patient is off stretcher. Physically safe environment: no spills, clutter or unnecessary equipment. Stretcher in lowest position, wheels locked, appropriate side rails in place. Provide visual cue, wrist band, yellow gown, etc. Monitor gait and stability. Monitor for mental status changes and reorient to person, place, and time. Review medications for side effects contributing to fall risk. Reinforce activity limits and safety measures with patient and family. Provide visual clues: red socks.

## 2019-01-13 NOTE — H&P ADULT - ATTENDING COMMENTS
Agree with plan and exam as above. Attending of record has reviewd note and made edits above.  In brief patient is admitted with colitis, started on IV flagyl and ciprofloxacin. Will get stool culture.   Low suspicion for c-difficile-no recent hospitalizations, antibiotic usage. NPO for now. IVF. Pain management.  Rest as above.

## 2019-01-13 NOTE — H&P ADULT - NSHPREVIEWOFSYSTEMS_GEN_ALL_CORE
Review of Systems  General: no fever, + chills  Eyes: no vision changes  ENT: no hearing changes, nasal congestions, or sore throat  CV: no chest pain or palpitations  Pulm: no SOB, wheezing, cough, or hemoptysis  Abd/GI: + nausea, + vomiting, + diarrhea, no constipation, + abd pain  : no dysuria, hematuria, urinary frequency  MSK: no joint pain or myalgias  Neuro: no syncope, dizziness, focal weakness  Psych: no anxiety or depression  Endo: no heat or cold intolerance

## 2019-01-13 NOTE — ED ADULT NURSE NOTE - OBJECTIVE STATEMENT
pt with complaints of left sided abdominal pain, nausea and vomiting, " I cant keep anything past my lips" everything I drink comes back up. pt incontinent of urine and stool.

## 2019-01-13 NOTE — H&P ADULT - PROBLEM SELECTOR PLAN 2
-Patient took coumadin last time on Friday 1/11/18 (missed dose yesterday due to nausea and vomiting, does not take warfarin on Sundays)  -Currently supratherapeutic INR  -Will hold warfarin for now, no signs of bleeding noted, will resume when INR therapeutic  -c/w diltiazem and metoprolol with hold parameters -Patient took coumadin last time on Friday 1/11/18 (missed dose yesterday due to nausea and vomiting, does not take warfarin on Sundays)  -Currently supratherapeutic INR  -Will hold warfarin for now, no signs of bleeding noted, will resume when INR therapeutic  -c/w diltiazem and metoprolol with hold parameters  -F/u PT/INR daily for coumadin dosing

## 2019-01-13 NOTE — H&P ADULT - ASSESSMENT
82 yo female w/ PMH of afib on coumadin, CVA and hx of aneurysm repair, HTN, HLD, mitral valve prolapse, PVD s/p stents x 3 (RLE), rheumatoid arthritis, cataracts of the L eye presents with abdominal pain and nausea/ vomiting for 1 day admitted with colitis.

## 2019-01-13 NOTE — H&P ADULT - PROBLEM SELECTOR PLAN 1
-admit to [facility]  -CT abdomen and pelvis shows left-sided colitis, Sigmoid diverticulosis is noted  -received flagyl and cipro in the ED  -will continue with flagyl and cipro  -NPO for now  -Mild leukocytosis, afebrile  -s/p 2L NS bolus, hemodynamically stable however dehydrated  -H/H stable but will continue to monitor, FOBT negative, will monitor for signs of bleeding -admit to F  -CT abdomen and pelvis shows left-sided colitis, Sigmoid diverticulosis is noted  -received flagyl and cipro in the ED  -will continue with flagyl and cipro  -NPO for now  -Mild leukocytosis, afebrile  -s/p 2L NS bolus, hemodynamically stable however dehydrated  -H/H stable but will continue to monitor, FOBT negative, will monitor for signs of bleeding  -zofran 4mg IV q6hr PRN nausea/ vomiting  -Tylenol for mild pain -admit to F  -CT abdomen and pelvis shows left-sided colitis, Sigmoid diverticulosis is noted  -received flagyl and cipro in the ED  -will continue with flagyl and cipro  -NPO for now  -Mild leukocytosis, afebrile  -s/p 2L NS bolus, hemodynamically stable however dehydrated  -H/H stable but will continue to monitor, FOBT negative, will monitor for signs of bleeding  -zofran 4mg IV q6hr PRN nausea/ vomiting  -Tylenol for mild pain, morphine for moderate, Dilaudid for severe pain  -F/u stool cultures  -c/w IVF

## 2019-01-13 NOTE — H&P ADULT - HISTORY OF PRESENT ILLNESS
84 yo female w/ PMH of afib on coumadin, CVA and hx of aneurysm repair, HTN, HLD, mitral valve prolapse, PVD s/p stents x 3 (RLE), rheumatoid arthritis, cataracts of the L eye presents with abdominal pain and nausea/ vomiting for 1 day.  Patient states that abdominal pain started yesterday before lunch, located more prominently on her lower abdomen radiating to her left lower back at times.  Pain has been constant since yesterday, but feels it has gotten worse.  Patient not able to tolerate anything by mouth, states that she vomits or has diarrhea every time she tries to eat something- had multiple episodes of nonbloody, yellow emesis and nonbloody diarrhea.  Patient had not eaten anything different yesterday but had Chinese food a few days ago, felt some mild stomach ache since then but yesterday pain became excruciating.  Patient has not taken anything to help with pain, denies fevers, but admits to chills since yesterday as well as increased weakness.  Patient uses walker to ambulate but was not able to get off couch all day.  Denies lightheadedness or dizziness, admits to dry mouth and feeling thirsty.  Denies chest pain, SOB, heartburn. Denies any recent sick contacts, recent abx usage, recent hospitalization. Patient did not take any medications yesterday due to nausea.      In the ED, T 98.4, HR 90, /72, RR 15, SpO2 97%.  Labs significant for negative FOBT, WBC 13.81, H/H 8.4/27.6 (baseline around 9 as per chart review), PT/INR 57.4/4.80, BUN 34, albumin 3.1, UA mildly positive (trace LE, positive Nitrite, many bacteria, WBC 6-10).    CT abdomen and pelvis w/o Contrast: Findings suggestive of left-sided colitis, correlate clinically for infectious/inflammatory versus ischemic colitis. Sigmoid diverticulosis is noted.  No bowel obstruction demonstrated. Cholelithiasis with distended gallbladder.  6 mm nonobstructing intrarenal calculus at the right lower pole calyces. No hydronephrosis or obstructing ureteral calculus is noted.  EKG: pending    In the ED, patient received 2L NS bolus, morphine 4mg x1, zofran 4mg x1, flagyl x1, cipro x1 (ordered, not yet received). 84 yo female w/ PMH of afib on coumadin, CVA and hx of aneurysm repair, HTN, HLD, mitral valve prolapse, PVD s/p stents x 3 (RLE), rheumatoid arthritis, cataracts of the L eye presents with abdominal pain and nausea/ vomiting for 1 day.  Patient states that abdominal pain started yesterday before lunch, located more prominently on her lower abdomen radiating to her left lower back at times.  Pain has been constant since yesterday, but feels it has gotten worse.  Patient not able to tolerate anything by mouth, states that she vomits or has diarrhea every time she tries to eat something- had multiple episodes of nonbloody, yellow emesis and nonbloody diarrhea.  Patient had not eaten anything different yesterday but had Chinese food a few days ago, felt some mild stomach ache since then but yesterday pain became excruciating.  Patient has not taken anything to help with pain, denies fevers, but admits to chills since yesterday as well as increased weakness.  Patient uses walker to ambulate but was not able to get off couch all day.  Denies lightheadedness or dizziness, admits to dry mouth and feeling thirsty.  Denies chest pain, SOB, heartburn. Denies any recent sick contacts, recent abx usage, recent hospitalization. Patient did not take any medications yesterday due to nausea.      In the ED, T 98.4, HR 90, /72, RR 15, SpO2 97%.  Labs significant for negative FOBT, WBC 13.81, H/H 8.4/27.6 (baseline around 9 as per chart review), PT/INR 57.4/4.80, BUN 34, albumin 3.1, UA mildly positive (trace LE, positive Nitrite, many bacteria, WBC 6-10).    CT abdomen and pelvis w/o Contrast: Findings suggestive of left-sided colitis, correlate clinically for infectious/inflammatory versus ischemic colitis. Sigmoid diverticulosis is noted.  No bowel obstruction demonstrated. Cholelithiasis with distended gallbladder.  6 mm nonobstructing intrarenal calculus at the right lower pole calyces. No hydronephrosis or obstructing ureteral calculus is noted.  EKG: NSR with LVH    In the ED, patient received 2L NS bolus, morphine 4mg x1, zofran 4mg x1, flagyl x1, cipro x1 (ordered, not yet received).

## 2019-01-14 LAB
ALBUMIN SERPL ELPH-MCNC: 2.5 G/DL — LOW (ref 3.3–5)
ALP SERPL-CCNC: 57 U/L — SIGNIFICANT CHANGE UP (ref 40–120)
ALT FLD-CCNC: 17 U/L — SIGNIFICANT CHANGE UP (ref 12–78)
ANION GAP SERPL CALC-SCNC: 7 MMOL/L — SIGNIFICANT CHANGE UP (ref 5–17)
APTT BLD: 45.3 SEC — HIGH (ref 28.5–37)
AST SERPL-CCNC: 16 U/L — SIGNIFICANT CHANGE UP (ref 15–37)
BILIRUB SERPL-MCNC: 0.6 MG/DL — SIGNIFICANT CHANGE UP (ref 0.2–1.2)
BUN SERPL-MCNC: 29 MG/DL — HIGH (ref 7–23)
CALCIUM SERPL-MCNC: 7.5 MG/DL — LOW (ref 8.5–10.1)
CHLORIDE SERPL-SCNC: 115 MMOL/L — HIGH (ref 96–108)
CO2 SERPL-SCNC: 25 MMOL/L — SIGNIFICANT CHANGE UP (ref 22–31)
CREAT SERPL-MCNC: 1.1 MG/DL — SIGNIFICANT CHANGE UP (ref 0.5–1.3)
GLUCOSE SERPL-MCNC: 91 MG/DL — SIGNIFICANT CHANGE UP (ref 70–99)
HCT VFR BLD CALC: 26.6 % — LOW (ref 34.5–45)
HGB BLD-MCNC: 8 G/DL — LOW (ref 11.5–15.5)
INR BLD: 5.84 RATIO — CRITICAL HIGH (ref 0.88–1.16)
MCHC RBC-ENTMCNC: 25.6 PG — LOW (ref 27–34)
MCHC RBC-ENTMCNC: 30.1 GM/DL — LOW (ref 32–36)
MCV RBC AUTO: 85.3 FL — SIGNIFICANT CHANGE UP (ref 80–100)
NRBC # BLD: 0 /100 WBCS — SIGNIFICANT CHANGE UP (ref 0–0)
PLATELET # BLD AUTO: 202 K/UL — SIGNIFICANT CHANGE UP (ref 150–400)
POTASSIUM SERPL-MCNC: 3.6 MMOL/L — SIGNIFICANT CHANGE UP (ref 3.5–5.3)
POTASSIUM SERPL-SCNC: 3.6 MMOL/L — SIGNIFICANT CHANGE UP (ref 3.5–5.3)
PROT SERPL-MCNC: 5.4 G/DL — LOW (ref 6–8.3)
PROTHROM AB SERPL-ACNC: 70.3 SEC — HIGH (ref 10–12.9)
RBC # BLD: 3.12 M/UL — LOW (ref 3.8–5.2)
RBC # FLD: 17.8 % — HIGH (ref 10.3–14.5)
SODIUM SERPL-SCNC: 147 MMOL/L — HIGH (ref 135–145)
WBC # BLD: 8.49 K/UL — SIGNIFICANT CHANGE UP (ref 3.8–10.5)
WBC # FLD AUTO: 8.49 K/UL — SIGNIFICANT CHANGE UP (ref 3.8–10.5)

## 2019-01-14 PROCEDURE — 99232 SBSQ HOSP IP/OBS MODERATE 35: CPT | Mod: GC

## 2019-01-14 RX ORDER — SODIUM CHLORIDE 9 MG/ML
1000 INJECTION, SOLUTION INTRAVENOUS
Qty: 0 | Refills: 0 | Status: DISCONTINUED | OUTPATIENT
Start: 2019-01-14 | End: 2019-01-15

## 2019-01-14 RX ORDER — SODIUM CHLORIDE 9 MG/ML
1000 INJECTION, SOLUTION INTRAVENOUS
Qty: 0 | Refills: 0 | Status: DISCONTINUED | OUTPATIENT
Start: 2019-01-14 | End: 2019-01-14

## 2019-01-14 RX ADMIN — Medication 100 MILLIGRAM(S): at 14:29

## 2019-01-14 RX ADMIN — Medication 100 MILLIGRAM(S): at 07:10

## 2019-01-14 RX ADMIN — Medication 81 MILLIGRAM(S): at 11:20

## 2019-01-14 RX ADMIN — Medication 50 MILLIGRAM(S): at 14:29

## 2019-01-14 RX ADMIN — GABAPENTIN 300 MILLIGRAM(S): 400 CAPSULE ORAL at 05:17

## 2019-01-14 RX ADMIN — SODIUM CHLORIDE 75 MILLILITER(S): 9 INJECTION INTRAMUSCULAR; INTRAVENOUS; SUBCUTANEOUS at 05:18

## 2019-01-14 RX ADMIN — Medication 100 MILLIGRAM(S): at 10:29

## 2019-01-14 RX ADMIN — SODIUM CHLORIDE 150 MILLILITER(S): 9 INJECTION, SOLUTION INTRAVENOUS at 09:08

## 2019-01-14 RX ADMIN — Medication 100 MILLIGRAM(S): at 23:15

## 2019-01-14 RX ADMIN — ATORVASTATIN CALCIUM 40 MILLIGRAM(S): 80 TABLET, FILM COATED ORAL at 23:15

## 2019-01-14 RX ADMIN — GABAPENTIN 300 MILLIGRAM(S): 400 CAPSULE ORAL at 23:15

## 2019-01-14 RX ADMIN — Medication 1 TABLET(S): at 11:20

## 2019-01-14 RX ADMIN — GABAPENTIN 300 MILLIGRAM(S): 400 CAPSULE ORAL at 14:28

## 2019-01-14 RX ADMIN — Medication 50 MILLIGRAM(S): at 23:15

## 2019-01-14 RX ADMIN — Medication 50 MILLIGRAM(S): at 05:17

## 2019-01-14 NOTE — PHYSICAL THERAPY INITIAL EVALUATION ADULT - ADDITIONAL COMMENTS
pt lives in 1st floor apartment in a [place].  Pt has a home health aide 7 hours/day x 6 days/wk.  Pt report daughter is also nearby and comes to assist her as needed.

## 2019-01-14 NOTE — PROGRESS NOTE ADULT - SUBJECTIVE AND OBJECTIVE BOX
CHIEF COMPLAINT/INTERVAL HISTORY:    REVIEW OF SYSTEMS:  No fever, CP, SOB or abdominal pain.    Vital Signs Last 24 Hrs  T(C): 36.2 (2019 04:56), Max: 36.6 (2019 20:35)  T(F): 97.2 (2019 04:56), Max: 97.9 (2019 20:35)  HR: 54 (2019 05:23) (54 - 81)  BP: 113/56 (2019 04:56) (108/53 - 187/74)  BP(mean): --  RR: 16 (2019 04:56) (16 - 18)  SpO2: 94% (2019 04:56) (94% - 100%)    PHYSICAL EXAM:  GENERAL: NAD  HEENT: EOMI, hearing normal, conjunctiva and sclera clear  Chest: CTA bilaterally, no wheezing  CV: S1S2, RRR, no rubs or gallops  GI: soft, +BS, NT/ND  Musculoskeletal: no edema  Psychiatric: affect nL, mood nL  Skin: warm and dry    LABS:                        8.0    8.49  )-----------( 202      ( 2019 07:58 )             26.6     01-14    147<H>  |  115<H>  |  29<H>  ----------------------------<  91  3.6   |  25  |  1.10    Ca    7.5<L>      2019 07:58    TPro  5.4<L>  /  Alb  2.5<L>  /  TBili  0.6  /  DBili  x   /  AST  16  /  ALT  17  /  AlkPhos  57  01-14    PT/INR - ( 2019 07:58 )   PT: 70.3 sec;   INR: 5.84 ratio         PTT - ( 2019 07:58 )  PTT:45.3 sec  Urinalysis Basic - ( 2019 10:06 )    Color: Yellow / Appearance: Slightly Turbid / S.010 / pH: x  Gluc: x / Ketone: Small  / Bili: Negative / Urobili: Negative   Blood: x / Protein: 75 mg/dL / Nitrite: Positive   Leuk Esterase: Trace / RBC: 0-2 /HPF / WBC 6-10   Sq Epi: x / Non Sq Epi: Occasional / Bacteria: Many    < from: CT Abdomen and Pelvis No Cont (19 @ 09:47) >  Limited noncontrast study.    Findings suggestive of left-sided colitis, correlate clinically for   infectious/inflammatory versus ischemic colitis.  No bowel obstruction demonstrated.    Cholelithiasis with distended gallbladder.    Other findings as discussed above.    < end of copied text > CHIEF COMPLAINT/INTERVAL HISTORY:  Pt. seen and evaluated for colitis.   Feeling better.  No longer having abdominal pain.  Tolerating IV antibiotics.     REVIEW OF SYSTEMS:  No fever, CP, SOB or abdominal pain.    Vital Signs Last 24 Hrs  T(C): 36.2 (2019 04:56), Max: 36.6 (2019 20:35)  T(F): 97.2 (2019 04:56), Max: 97.9 (2019 20:35)  HR: 54 (2019 05:23) (54 - 81)  BP: 113/56 (2019 04:56) (108/53 - 187/74)  BP(mean): --  RR: 16 (2019 04:56) (16 - 18)  SpO2: 94% (2019 04:56) (94% - 100%)    PHYSICAL EXAM:  GENERAL: NAD  HEENT: EOMI, hearing normal, conjunctiva and sclera clear  Chest: CTA bilaterally, no wheezing  CV: S1S2, RRR, no rubs or gallops  GI: soft, +BS, NT/ND  Musculoskeletal: no edema  Psychiatric: affect nL, mood nL  Skin: warm and dry    LABS:                        8.0    8.49  )-----------( 202      ( 2019 07:58 )             26.6     01-14    147<H>  |  115<H>  |  29<H>  ----------------------------<  91  3.6   |  25  |  1.10    Ca    7.5<L>      2019 07:58    TPro  5.4<L>  /  Alb  2.5<L>  /  TBili  0.6  /  DBili  x   /  AST  16  /  ALT  17  /  AlkPhos  57  01-14    PT/INR - ( 2019 07:58 )   PT: 70.3 sec;   INR: 5.84 ratio         PTT - ( 2019 07:58 )  PTT:45.3 sec  Urinalysis Basic - ( 2019 10:06 )    Color: Yellow / Appearance: Slightly Turbid / S.010 / pH: x  Gluc: x / Ketone: Small  / Bili: Negative / Urobili: Negative   Blood: x / Protein: 75 mg/dL / Nitrite: Positive   Leuk Esterase: Trace / RBC: 0-2 /HPF / WBC 6-10   Sq Epi: x / Non Sq Epi: Occasional / Bacteria: Many    < from: CT Abdomen and Pelvis No Cont (19 @ 09:47) >  Limited noncontrast study.    Findings suggestive of left-sided colitis, correlate clinically for   infectious/inflammatory versus ischemic colitis.  No bowel obstruction demonstrated.    Cholelithiasis with distended gallbladder.    Other findings as discussed above.    < end of copied text >

## 2019-01-14 NOTE — PROGRESS NOTE ADULT - ASSESSMENT
Assessment and Plan:   -colitis: start on clears and advance diet as tolerated   -hypernatremia- 147, D/C'd NS, started D5W 150ml/hr for 6 hours.  -urine cx positive for e. coli. continue cipro and metro.   - A.Fib: c/w diltiazem and metoprolol with hold parameters  - Anemia: normocytic, chronic, continue to monitor  - HTN: c/w hydralazine 50mg TIB and metoprolol succinate ER 100mg QD with hold parameters  - HLD: continue atorvastatin   - PVD: c/w ASA 81mg daily and gabapentin 100mg TID for neuropathy  - AAA: chronic  - DVT proph: holding coumadin for now as supra- therapeutic, SCDs, PT eval

## 2019-01-14 NOTE — PHYSICAL THERAPY INITIAL EVALUATION ADULT - PERTINENT HX OF CURRENT PROBLEM, REHAB EVAL
84 yo F presents with abdominal pain and nausea/vomiting x 1 day. Pt states abdominal pain. Pain has been constant, but feels it has gotten worse.  CT abdomen and pelvis: left-sided colitis. Sigmoid diverticulosis is noted.  No bowel obstruction demonstrated. Cholelithiasis with distended gallbladder.  6 mm nonobstructing intrarenal calculus at the right lower pole calyces.

## 2019-01-15 LAB
-  AMIKACIN: SIGNIFICANT CHANGE UP
-  AMOXICILLIN/CLAVULANIC ACID: SIGNIFICANT CHANGE UP
-  AMPICILLIN/SULBACTAM: SIGNIFICANT CHANGE UP
-  AMPICILLIN: SIGNIFICANT CHANGE UP
-  AZTREONAM: SIGNIFICANT CHANGE UP
-  CEFAZOLIN: SIGNIFICANT CHANGE UP
-  CEFEPIME: SIGNIFICANT CHANGE UP
-  CEFOXITIN: SIGNIFICANT CHANGE UP
-  CEFTRIAXONE: SIGNIFICANT CHANGE UP
-  CIPROFLOXACIN: SIGNIFICANT CHANGE UP
-  ERTAPENEM: SIGNIFICANT CHANGE UP
-  GENTAMICIN: SIGNIFICANT CHANGE UP
-  IMIPENEM: SIGNIFICANT CHANGE UP
-  LEVOFLOXACIN: SIGNIFICANT CHANGE UP
-  MEROPENEM: SIGNIFICANT CHANGE UP
-  NITROFURANTOIN: SIGNIFICANT CHANGE UP
-  PIPERACILLIN/TAZOBACTAM: SIGNIFICANT CHANGE UP
-  TIGECYCLINE: SIGNIFICANT CHANGE UP
-  TOBRAMYCIN: SIGNIFICANT CHANGE UP
-  TRIMETHOPRIM/SULFAMETHOXAZOLE: SIGNIFICANT CHANGE UP
ANION GAP SERPL CALC-SCNC: 8 MMOL/L — SIGNIFICANT CHANGE UP (ref 5–17)
BUN SERPL-MCNC: 25 MG/DL — HIGH (ref 7–23)
CALCIUM SERPL-MCNC: 7.8 MG/DL — LOW (ref 8.5–10.1)
CHLORIDE SERPL-SCNC: 113 MMOL/L — HIGH (ref 96–108)
CO2 SERPL-SCNC: 25 MMOL/L — SIGNIFICANT CHANGE UP (ref 22–31)
CREAT SERPL-MCNC: 1.1 MG/DL — SIGNIFICANT CHANGE UP (ref 0.5–1.3)
CULTURE RESULTS: SIGNIFICANT CHANGE UP
GLUCOSE SERPL-MCNC: 103 MG/DL — HIGH (ref 70–99)
HCT VFR BLD CALC: 30 % — LOW (ref 34.5–45)
HGB BLD-MCNC: 9.1 G/DL — LOW (ref 11.5–15.5)
INR BLD: 4.57 RATIO — HIGH (ref 0.88–1.16)
MAGNESIUM SERPL-MCNC: 1.8 MG/DL — SIGNIFICANT CHANGE UP (ref 1.6–2.6)
MCHC RBC-ENTMCNC: 25.7 PG — LOW (ref 27–34)
MCHC RBC-ENTMCNC: 30.3 GM/DL — LOW (ref 32–36)
MCV RBC AUTO: 84.7 FL — SIGNIFICANT CHANGE UP (ref 80–100)
METHOD TYPE: SIGNIFICANT CHANGE UP
NRBC # BLD: 0 /100 WBCS — SIGNIFICANT CHANGE UP (ref 0–0)
ORGANISM # SPEC MICROSCOPIC CNT: SIGNIFICANT CHANGE UP
ORGANISM # SPEC MICROSCOPIC CNT: SIGNIFICANT CHANGE UP
PLATELET # BLD AUTO: 226 K/UL — SIGNIFICANT CHANGE UP (ref 150–400)
POTASSIUM SERPL-MCNC: 3.4 MMOL/L — LOW (ref 3.5–5.3)
POTASSIUM SERPL-SCNC: 3.4 MMOL/L — LOW (ref 3.5–5.3)
PROTHROM AB SERPL-ACNC: 54.1 SEC — HIGH (ref 10–12.9)
RBC # BLD: 3.54 M/UL — LOW (ref 3.8–5.2)
RBC # FLD: 17.6 % — HIGH (ref 10.3–14.5)
SODIUM SERPL-SCNC: 146 MMOL/L — HIGH (ref 135–145)
SPECIMEN SOURCE: SIGNIFICANT CHANGE UP
WBC # BLD: 7.28 K/UL — SIGNIFICANT CHANGE UP (ref 3.8–10.5)
WBC # FLD AUTO: 7.28 K/UL — SIGNIFICANT CHANGE UP (ref 3.8–10.5)

## 2019-01-15 PROCEDURE — 99232 SBSQ HOSP IP/OBS MODERATE 35: CPT | Mod: GC

## 2019-01-15 PROCEDURE — 99222 1ST HOSP IP/OBS MODERATE 55: CPT

## 2019-01-15 RX ORDER — SODIUM CHLORIDE 9 MG/ML
1000 INJECTION, SOLUTION INTRAVENOUS
Qty: 0 | Refills: 0 | Status: DISCONTINUED | OUTPATIENT
Start: 2019-01-15 | End: 2019-01-16

## 2019-01-15 RX ORDER — POTASSIUM CHLORIDE 20 MEQ
40 PACKET (EA) ORAL ONCE
Qty: 0 | Refills: 0 | Status: COMPLETED | OUTPATIENT
Start: 2019-01-15 | End: 2019-01-15

## 2019-01-15 RX ORDER — PIPERACILLIN AND TAZOBACTAM 4; .5 G/20ML; G/20ML
3.38 INJECTION, POWDER, LYOPHILIZED, FOR SOLUTION INTRAVENOUS EVERY 8 HOURS
Qty: 0 | Refills: 0 | Status: DISCONTINUED | OUTPATIENT
Start: 2019-01-15 | End: 2019-01-16

## 2019-01-15 RX ADMIN — Medication 100 MILLIGRAM(S): at 06:19

## 2019-01-15 RX ADMIN — Medication 50 MILLIGRAM(S): at 06:19

## 2019-01-15 RX ADMIN — Medication 40 MILLIEQUIVALENT(S): at 10:23

## 2019-01-15 RX ADMIN — PIPERACILLIN AND TAZOBACTAM 25 GRAM(S): 4; .5 INJECTION, POWDER, LYOPHILIZED, FOR SOLUTION INTRAVENOUS at 22:25

## 2019-01-15 RX ADMIN — Medication 50 MILLIGRAM(S): at 22:25

## 2019-01-15 RX ADMIN — Medication 50 MILLIGRAM(S): at 13:49

## 2019-01-15 RX ADMIN — ATORVASTATIN CALCIUM 40 MILLIGRAM(S): 80 TABLET, FILM COATED ORAL at 22:25

## 2019-01-15 RX ADMIN — Medication 81 MILLIGRAM(S): at 11:29

## 2019-01-15 RX ADMIN — Medication 1 TABLET(S): at 11:29

## 2019-01-15 RX ADMIN — SODIUM CHLORIDE 60 MILLILITER(S): 9 INJECTION, SOLUTION INTRAVENOUS at 11:29

## 2019-01-15 RX ADMIN — GABAPENTIN 300 MILLIGRAM(S): 400 CAPSULE ORAL at 06:19

## 2019-01-15 RX ADMIN — Medication 120 MILLIGRAM(S): at 06:19

## 2019-01-15 RX ADMIN — Medication 100 MILLIGRAM(S): at 10:23

## 2019-01-15 RX ADMIN — PIPERACILLIN AND TAZOBACTAM 25 GRAM(S): 4; .5 INJECTION, POWDER, LYOPHILIZED, FOR SOLUTION INTRAVENOUS at 13:49

## 2019-01-15 NOTE — PROGRESS NOTE ADULT - ASSESSMENT
Pt. seen and evaluated for colitis.  Pt. reports feeling better today with no further abdominal pain.  Tolerating IV antibiotics and diet. Physical examination as above.      Plan:  -Colitis:  continue IV Cipro and Flagyl.  Check stool cx.  Tolerating DASH regular consistency diet.   -Afib:  continue Cardizem CD 120mg PO daily and toprol XL 100mg PO daily.  holding coumadin for supra-therapeutic INR  -Anemia:  stable.  Will continue to monitor  -HTN:  continue Cardizem CD, hydralazine, and Toprol XL  -HLD: continue statin therapy  -PVD: continue ASA and Lipitor  -AAA: stable  -VTE ppx: SCD Pt. seen and evaluated for colitis.  Pt. reports feeling better today with no further abdominal pain.  Tolerating IV antibiotics and diet. Physical examination as above.      Plan:  - Dehydration: 1/2 NS 60ml/hr for 12 hours  -Colitis:  continue IV Cipro and Flagyl.  Check stool cx.  Tolerating DASH regular consistency diet.   -Afib:  continue Cardizem CD 120mg PO daily and toprol XL 100mg PO daily.  holding coumadin for supra-therapeutic INR  -Anemia:  stable.  Will continue to monitor  -HTN:  continue Cardizem CD, hydralazine, and Toprol XL  -HLD: continue statin therapy  -PVD: continue ASA and Lipitor  -AAA: stable  -VTE ppx: SCD

## 2019-01-15 NOTE — PROGRESS NOTE ADULT - SUBJECTIVE AND OBJECTIVE BOX
CHIEF COMPLAINT/INTERVAL HISTORY: Pt. seen and evaluated for colitis.  Feeling better. No longer having abdominal pain.  Tolerating DASH regular consistency diet. Tolerating IV antibiotics.     REVIEW OF SYSTEMS:  No fever, CP, SOB or abdominal pain.    Vital Signs Last 24 Hrs  T(C): 36.3 (15 Luis M 2019 04:38), Max: 36.7 (2019 20:48)  T(F): 97.3 (15 Luis M 2019 04:38), Max: 98 (2019 20:48)  HR: 64 (15 Luis M 2019 04:38) (57 - 76)  BP: 152/84 (15 Luis M 2019 04:38) (115/65 - 152/84)  BP(mean): --  RR: 16 (15 Luis M 2019 04:38) (15 - 16)  SpO2: 96% (15 Luis M 2019 04:38) (95% - 98%)    PHYSICAL EXAM:  GENERAL: NAD  HEENT: EOMI, hearing normal, conjunctiva and sclera clear  Chest: CTA bilaterally, no wheezing  CV: S1S2, RRR  GI: soft, +BS, NT/ND  Musculoskeletal: no edema  Psychiatric: affect nL, mood nL  Skin: warm and dry    LABS:                        9.1    7.28  )-----------( 226      ( 15 Luis M 2019 08:54 )             30.0     01-15    146<H>  |  113<H>  |  25<H>  ----------------------------<  103<H>  3.4<L>   |  25  |  1.10    Ca    7.8<L>      15 Luis M 2019 08:54  Mg     1.8     01-15    TPro  5.4<L>  /  Alb  2.5<L>  /  TBili  0.6  /  DBili  x   /  AST  16  /  ALT  17  /  AlkPhos  57  01-14    PT/INR - ( 2019 07:58 )   PT: 70.3 sec;   INR: 5.84 ratio         PTT - ( 2019 07:58 )  PTT:45.3 sec  Urinalysis Basic - ( 2019 10:06 )    Color: Yellow / Appearance: Slightly Turbid / S.010 / pH: x  Gluc: x / Ketone: Small  / Bili: Negative / Urobili: Negative   Blood: x / Protein: 75 mg/dL / Nitrite: Positive   Leuk Esterase: Trace / RBC: 0-2 /HPF / WBC 6-10   Sq Epi: x / Non Sq Epi: Occasional / Bacteria: Many CHIEF COMPLAINT/INTERVAL HISTORY: Pt. seen and evaluated for colitis.  No longer having abdominal pain.  Tolerating DASH regular consistency diet. Tolerating IV antibiotics.     REVIEW OF SYSTEMS:  No fever, CP, SOB or abdominal pain.    Vital Signs Last 24 Hrs  T(C): 36.3 (15 Luis M 2019 04:38), Max: 36.7 (2019 20:48)  T(F): 97.3 (15 Luis M 2019 04:38), Max: 98 (2019 20:48)  HR: 64 (15 Luis M 2019 04:38) (57 - 76)  BP: 152/84 (15 Luis M 2019 04:38) (115/65 - 152/84)  BP(mean): --  RR: 16 (15 Luis M 2019 04:38) (15 - 16)  SpO2: 96% (15 Luis M 2019 04:38) (95% - 98%)    PHYSICAL EXAM:  GENERAL: NAD  HEENT: EOMI, hearing normal, conjunctiva and sclera clear  Chest: CTA bilaterally, no wheezing  CV: S1S2, RRR  GI: soft, +BS, NT/ND  Musculoskeletal: no edema  Psychiatric: affect nL, mood nL  Skin: warm and dry    LABS:                        9.1    7.28  )-----------( 226      ( 15 Luis M 2019 08:54 )             30.0     01-15    146<H>  |  113<H>  |  25<H>  ----------------------------<  103<H>  3.4<L>   |  25  |  1.10    Ca    7.8<L>      15 Luis M 2019 08:54  Mg     1.8     -15    TPro  5.4<L>  /  Alb  2.5<L>  /  TBili  0.6  /  DBili  x   /  AST  16  /  ALT  17  /  AlkPhos  57  01-14    PT/INR - ( 2019 07:58 )   PT: 70.3 sec;   INR: 5.84 ratio         PTT - ( 2019 07:58 )  PTT:45.3 sec  Urinalysis Basic - ( 2019 10:06 )    Color: Yellow / Appearance: Slightly Turbid / S.010 / pH: x  Gluc: x / Ketone: Small  / Bili: Negative / Urobili: Negative   Blood: x / Protein: 75 mg/dL / Nitrite: Positive   Leuk Esterase: Trace / RBC: 0-2 /HPF / WBC 6-10   Sq Epi: x / Non Sq Epi: Occasional / Bacteria: Many

## 2019-01-15 NOTE — CONSULT NOTE ADULT - ASSESSMENT
84 yo female w/ PMH of PAF AC, HFPEF, COPD, CVA and hx of aneurysm repair, HTN, HLD, mitral valve prolapse, PAD s/p bypass/stents, with residual non revascularizable disease presents with colitis    - Abd pain likely from colitis.   - Cont abx    - PAF  - Maintaining SR.   - Cont Toprol 100mg HS  - Cont Dilt 120mg Qday  - Hold Coumadin currently as supratheraputic. monitor for bleeding.     - Appears compensated from HF POV.   - Hold off on diuretics.   - improving with IVF  - Encourage PO intake  - Monitor closely for vol ol    - Cont asa and statin  - BP relatively controlled. Cont hydralazine.     - Monitor and replete electrolytes. Keep K>4.0 and Mg>2.0.   - Further cardiac workup will depend on clinical course.   - All other workup per primary team. Will followup.

## 2019-01-15 NOTE — CONSULT NOTE ADULT - SUBJECTIVE AND OBJECTIVE BOX
CHIEF COMPLAINT: Patient is a 83y old  Female who presents with a chief complaint of abdominal pain (15 Luis M 2019 09:29)      HPI:  82 yo female w/ PMH of PAF AC, HFPEF, CVA and hx of aneurysm repair, HTN, HLD, mitral valve prolapse, PAD s/p bypass/stents, with residual non revascularizable disease, rheumatoid arthritis, cataracts of the L eye presents with abdominal pain and nausea/ vomiting for 1 day.  Patient states that abdominal pain started yesterday before lunch, located more prominently on her lower abdomen radiating to her left lower back at times.  Pain has been constant since yesterday, but feels it has gotten worse.  Patient not able to tolerate anything by mouth, states that she vomits or has diarrhea every time she tries to eat something- had multiple episodes of nonbloody, yellow emesis and nonbloody diarrhea.  Patient had not eaten anything different yesterday but had Chinese food a few days ago, felt some mild stomach ache since then but yesterday pain became excruciating.  Patient has not taken anything to help with pain, denies fevers, but admits to chills since yesterday as well as increased weakness.  Patient uses walker to ambulate but was not able to get off couch all day.  Denies lightheadedness or dizziness, admits to dry mouth and feeling thirsty     In the ED, T 98.4, HR 90, /72, RR 15, SpO2 97%.  Labs significant for negative FOBT, WBC 13.81, H/H 8.4/27.6 (baseline around 9 as per chart review), PT/INR 57.4/4.80, BUN 34, albumin 3.1, UA mildly positive (trace LE, positive Nitrite, many bacteria, WBC 6-10).    CT abdomen and pelvis w/o Contrast: Findings suggestive of left-sided colitis, correlate clinically for infectious/inflammatory versus ischemic colitis. Sigmoid diverticulosis is noted.  No bowel obstruction demonstrated. Cholelithiasis with distended gallbladder.  6 mm nonobstructing intrarenal calculus at the right lower pole calyces. No hydronephrosis or obstructing ureteral calculus is noted.  EKG: NSR with LVH    In the ED, patient received 2L NS bolus, morphine 4mg x1, zofran 4mg x1, flagyl x1, cipro x1 (ordered, not yet received). (13 Jan 2019 11:38)    Currently feeling weak.. Denies any chest pain, dyspnea, palpitations, PND, orthopnea, near syncope, syncope, lower extremity edema, stroke like symptoms. Nausea and abd pain have improved.   Still on IV Abx.     EKG: < from: 12 Lead ECG (01.13.19 @ 11:33) >   Normal sinus rhythm  Left axis deviation  Minimal voltage criteria for LVH, may be normal variant  Possible Anterior infarct , age undetermined  Abnormal ECG    < end of copied text >      REVIEW OF SYSTEMS:   All other review of systems are negative unless indicated above    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  Osteoarthritis  Afib  PVD (peripheral vascular disease)  Neuropathy: (Right lower leg)  H/O cerebral aneurysm repair: brain clips  CVA (cerebral vascular accident): (&quot;Mini-stroke&quot;,1990&#x27;s)  Rheumatoid arthritis  Hyperlipidemia  Hypertension  S/P cataract surgery: (Left eye)  Elective surgery: (&quot;Twisted bowel&quot;, 2014)  Elective surgery: (Exision of cyst on liver, 1985)  S/P ORIF (open reduction internal fixation) fracture: Left hip fx (2012) &amp; R hip fx (2013)  H/O cerebral aneurysm repair: Brain clips (1978(  Renal stone: Cysto stent placement 10/1/2014  PVD (peripheral vascular disease): s/p RLE bypass x 3, most recent 3/2012 Right external iliac to PT bypass w/ PTFE (2012)  S/P FRED (total abdominal hysterectomy): (1987, Hx of &quot;ovarian cancer?&quot;)      SOCIAL HISTORY:  No tobacco, ethanol, or drug abuse.    FAMILY HISTORY:  No pertinent family history in first degree relatives    No family history of acute MI or sudden cardiac death.    MEDICATIONS  (STANDING):  aspirin enteric coated 81 milliGRAM(s) Oral daily  atorvastatin 40 milliGRAM(s) Oral at bedtime  diltiazem    milliGRAM(s) Oral daily  hydrALAZINE 50 milliGRAM(s) Oral three times a day  metoprolol succinate  milliGRAM(s) Oral daily  multivitamin 1 Tablet(s) Oral daily  piperacillin/tazobactam IVPB. 3.375 Gram(s) IV Intermittent every 8 hours  sodium chloride 0.45%. 1000 milliLiter(s) (60 mL/Hr) IV Continuous <Continuous>    MEDICATIONS  (PRN):  acetaminophen   Tablet .. 650 milliGRAM(s) Oral every 6 hours PRN Mild Pain (1 - 3)  HYDROmorphone  Injectable 0.5 milliGRAM(s) IV Push every 4 hours PRN Severe Pain (7 - 10)  morphine  - Injectable 2 milliGRAM(s) IV Push every 4 hours PRN Moderate Pain (4 - 6)  ondansetron Injectable 4 milliGRAM(s) IV Push every 6 hours PRN Nausea  ondansetron Injectable 4 milliGRAM(s) IV Push every 6 hours PRN Nausea and/or Vomiting      Allergies    No Known Allergies    Intolerances        Home meds:  Home Medications:  aspirin 81 mg oral delayed release tablet: 1 tab(s) orally once a day (13 Jan 2019 14:08)  atorvastatin 40 mg oral tablet: 1 tab(s) orally once a day (at bedtime) (13 Jan 2019 14:08)  dilTIAZem 120 mg oral tablet: 1 tab(s) orally once a day (13 Jan 2019 14:08)  gabapentin 300 mg oral capsule: 1 cap(s) orally 3 times a day (13 Jan 2019 14:08)  hydrALAZINE 50 mg oral tablet: 1 tab(s) orally 3 times a day (13 Jan 2019 14:08)  multivitamin:   once a day (13 Jan 2019 14:08)  warfarin 5 mg oral tablet: six days a week except Sunday (13 Jan 2019 14:08)  Toprol 100mg HS.       VITAL SIGNS:   Vital Signs Last 24 Hrs  T(C): 36.7 (15 Luis M 2019 13:44), Max: 36.7 (14 Jan 2019 20:48)  T(F): 98.1 (15 Luis M 2019 13:44), Max: 98.1 (15 Luis M 2019 13:44)  HR: 69 (15 Luis M 2019 13:44) (64 - 76)  BP: 133/67 (15 Luis M 2019 13:44) (128/67 - 152/84)  BP(mean): --  RR: 16 (15 Luis M 2019 13:44) (15 - 16)  SpO2: 96% (15 Luis M 2019 13:44) (96% - 98%)    I&O's Summary    14 Jan 2019 07:01  -  15 Luis M 2019 07:00  --------------------------------------------------------  IN: 1985 mL / OUT: 0 mL / NET: 1985 mL        On Exam:     Constitutional: NAD, awake    HEENT: Moist Mucous Membranes, Anicteric  Pulmonary: Decreased breath sounds b/l. No rales, crackles or wheeze appreciated.   Cardiovascular: Regular, S1 and S2, 2/6 SM  Gastrointestinal: Bowel Sounds present, soft, nontender.   Lymph: trace peripheral edema. No lymphadenopathy.  Skin: No visible rashes or ulcers.  Psych:  Mood & affect appropriate for situation    LABS: All Labs Reviewed:                        9.1    7.28  )-----------( 226      ( 15 Luis M 2019 08:54 )             30.0                         8.0    8.49  )-----------( 202      ( 14 Jan 2019 07:58 )             26.6                         8.4    13.81 )-----------( 277      ( 13 Jan 2019 08:24 )             27.6     15 Luis M 2019 08:54    146    |  113    |  25     ----------------------------<  103    3.4     |  25     |  1.10   14 Jan 2019 07:58    147    |  115    |  29     ----------------------------<  91     3.6     |  25     |  1.10   13 Jan 2019 08:24    143    |  107    |  34     ----------------------------<  125    3.8     |  24     |  1.30     Ca    7.8        15 Luis M 2019 08:54  Ca    7.5        14 Jan 2019 07:58  Ca    8.4        13 Jan 2019 08:24  Mg     1.8       15 Luis M 2019 08:54    TPro  5.4    /  Alb  2.5    /  TBili  0.6    /  DBili  x      /  AST  16     /  ALT  17     /  AlkPhos  57     14 Jan 2019 07:58  TPro  7.0    /  Alb  3.1    /  TBili  0.8    /  DBili  x      /  AST  18     /  ALT  24     /  AlkPhos  75     13 Jan 2019 08:24    PT/INR - ( 15 Luis M 2019 08:54 )   PT: 54.1 sec;   INR: 4.57 ratio         PTT - ( 14 Jan 2019 07:58 )  PTT:45.3 sec      Blood Culture: Organism Escherichia coli  Gram Stain Blood -- Gram Stain --  Specimen Source .Urine Clean Catch (Midstream)  Culture-Blood --            RADIOLOGY:    < from: CT Abdomen and Pelvis No Cont (01.13.19 @ 09:47) >    EXAM:  CT ABDOMEN AND PELVIS                            PROCEDURE DATE:  01/13/2019          INTERPRETATION:  CT ABDOMEN AND PELVIS    CLINICAL INFORMATION:  Abdominal pain, vomiting and diarrhea.    PROCEDURE:  Using multislice helical CT, withoutintravenous or oral   contrast 2.5 mm sections were obtained from the domes of the diaphragm to   the ischial tuberosities.  Coronal reformatted images were performed.    COMPARISON: CT scan of the abdomen pelvis 7/21/2018.    FINDINGS: Evaluation ofthe solid organ parenchyma is limited by the lack   of intravenous contrast.      There are peripheral cystic airspaces at the visualized dependent lower   lobes, which could reflect changes of interstitial fibrosis.    Distended gallbladder containing large dependent gallstones is noted. No   gallbladder wall thickening is demonstrated.  There is been interval removal of the biliary stent.  No extrahepatic biliary ductal dilatation is noted.    The liver and spleen appear unremarkable.  The adrenal glands and nonenhanced pancreas are unremarkable in   appearance.    There is a lobular contour to both kidneys with atrophic changes at the   inferior pole the right kidney.  There is a 6 mm nonobstructing intrarenal calculus at the right lower   pole calyces.  There is renal vascular calcifications.  There are prominent bilateral extrarenal pelvises. No hydronephrosis or   obstructing ureteral calculus is noted.    There is arteriosclerotic calcification of the abdominal aorta and major   branch vessels.  Again noted is fusiform infrarenal abdominal aortic aneurysm, measuring   up to 3.6 cm.  No enlarged retroperitoneal lymphadenopathy is noted.  Right femoral bypass graft present.    The evaluation of the stomach and gastrointestinal tract is limited   without distention.  The evaluation of the bowel wall is limited without intravenous contrast.  There is pericolic stranding involving the left colon extending from the   splenic flexure through descending colon. This finding may be associated   with a colitis.  No localized intra-abdominal fluid collection or pneumoperitoneum is   noted.  Sigmoid diverticulosis is noted, without CT evidence of diverticulitis.   Surgical clips are present in the left pelvis.  Right lower quadrant small bowel surgical anastomosis is noted.  The appendix is not clearly demonstrated on this exam.    The urinary bladder is moderately distended.  Status post hysterectomy.  No free fluid is noted within the pelvis.    There is a transitional lumbosacral vertebra, with mild anterolisthesis   L5 on S1.  Partially imaged is internal fixation bilateral hips.    Impression:    Limited noncontrast study.    Findings suggestive of left-sided colitis, correlate clinically for   infectious/inflammatory versus ischemic colitis.  No bowel obstruction demonstrated.    Cholelithiasis with distended gallbladder.    Other findings as discussed above.                                FLAVIO BARAHONA M.D., ATTENDING RADIOLOGIST  This document has been electronically signed. Jan 13 2019 10:07AM                < end of copied text >  < from: Xray Chest 1 View AP/PA (07.20.18 @ 21:42) >    EXAM:  XR CHEST AP OR PA 1V                            PROCEDURE DATE:  07/20/2018          INTERPRETATION:  CLINICAL INFORMATION: Fever, question pneumonia.    A single portable view of the chest was obtained.     Comparison: Chest x-ray 2/5/2018    The mediastinal cardiac silhouette is unremarkable. Prominent aortic   calcifications.    The lungs are clear.     No acute osseous finding.     IMPRESSION:    Clear lungs.                ROCK BLUE M.D., ATTENDING RADIOLOGIST  This document has been electronically signed. Jul 21 2018  8:59AM                < end of copied text >  < from: TTE Echo Doppler w/o Cont (02.05.18 @ 08:11) >     EXAM:  ECHO TTE W/O CON COMP W/DOPPLR         PROCEDURE DATE:  02/05/2018        INTERPRETATION:  INDICATION: respiratory failure  Referring M.D.:Lona  Blood Pressure 126/59        Weight (kg) :49     Height (cm):157       BSA (sq m): 1.47  Technician: JOJO    Dimensions:    LA 3.4       Normal Values: 2.0 - 4.0 cm    Ao 3.0        Normal Values: 2.0 - 3.8 cm  SEPTUM 0.8       Normal Values: 0.6 - 1.2 cm  PWT 0.8       Normal Values: 0.6 - 1.1 cm  LVIDd 3.4         Normal Values: 3.0 - 5.6 cm  LVIDs 2.3         Normal Values: 1.8 - 4.0 cm      OBSERVATIONS:  Technically difficult bedside ICU study. The patient is vented  Mitral Valve: Calcified mitral annulus and mitral valve leaflets with   normal opening. Mild mitral regurgitation.  Aortic Valve/Aorta: Calcified trileaflet aortic valve with normal opening.  Tricuspid Valve: Calcified trileaflet cusp at annulus with normally   opening valve leaflets. Mild tricuspid regurgitation.  Pulmonic Valve: The pulmonic valve is not well visualized. Probably   normal. Mild PI  Left Atrium: Small to moderate left atrial enlargement  Right Atrium: Normal  Left Ventricle: The endocardium is not well-visualized. There appears to   be grossly preserved left ventricular systolic function. There appears to   be concentric left ventricular hypertrophy. The EF is approximately 65%.  Right Ventricle: Normal right ventricular size and function. There is a   device wire noted in the right heart  Pericardium/Pleura: No pericardial effusion noted. Left pleural effusion   noted  Pulmonary/RV Pressure: The right ventricular systolic pressure is   estimated to be 41mmHg, assuming that the right atrial pressure is   estimated to be 8 mmHg. This is consistent with mild pulmonary   hypertension.  LV Diastolic Function: Stage II diastolic dysfunction    Conclusion: Likely difficult and limited study. Grossly preserved left   ventricular systolic function. Sensation diastolic dysfunction. Left   pleural effusion is noted.                  KIET NIETO M.D., ATTENDING CARDIOLOGIST  This document has been electronically signed. Feb 6 2018  7:49PM                < end of copied text >

## 2019-01-16 ENCOUNTER — TRANSCRIPTION ENCOUNTER (OUTPATIENT)
Age: 84
End: 2019-01-16

## 2019-01-16 VITALS — WEIGHT: 114.2 LBS

## 2019-01-16 LAB
ANION GAP SERPL CALC-SCNC: 6 MMOL/L — SIGNIFICANT CHANGE UP (ref 5–17)
BUN SERPL-MCNC: 23 MG/DL — SIGNIFICANT CHANGE UP (ref 7–23)
CALCIUM SERPL-MCNC: 7.9 MG/DL — LOW (ref 8.5–10.1)
CHLORIDE SERPL-SCNC: 113 MMOL/L — HIGH (ref 96–108)
CO2 SERPL-SCNC: 27 MMOL/L — SIGNIFICANT CHANGE UP (ref 22–31)
CREAT SERPL-MCNC: 1.2 MG/DL — SIGNIFICANT CHANGE UP (ref 0.5–1.3)
GLUCOSE SERPL-MCNC: 99 MG/DL — SIGNIFICANT CHANGE UP (ref 70–99)
HCT VFR BLD CALC: 28.1 % — LOW (ref 34.5–45)
HGB BLD-MCNC: 8.5 G/DL — LOW (ref 11.5–15.5)
INR BLD: 3.25 RATIO — HIGH (ref 0.88–1.16)
MAGNESIUM SERPL-MCNC: 1.7 MG/DL — SIGNIFICANT CHANGE UP (ref 1.6–2.6)
MCHC RBC-ENTMCNC: 25.5 PG — LOW (ref 27–34)
MCHC RBC-ENTMCNC: 30.2 GM/DL — LOW (ref 32–36)
MCV RBC AUTO: 84.4 FL — SIGNIFICANT CHANGE UP (ref 80–100)
NRBC # BLD: 0 /100 WBCS — SIGNIFICANT CHANGE UP (ref 0–0)
PLATELET # BLD AUTO: 220 K/UL — SIGNIFICANT CHANGE UP (ref 150–400)
POTASSIUM SERPL-MCNC: 3.8 MMOL/L — SIGNIFICANT CHANGE UP (ref 3.5–5.3)
POTASSIUM SERPL-SCNC: 3.8 MMOL/L — SIGNIFICANT CHANGE UP (ref 3.5–5.3)
PROTHROM AB SERPL-ACNC: 38.4 SEC — HIGH (ref 10–12.9)
RBC # BLD: 3.33 M/UL — LOW (ref 3.8–5.2)
RBC # FLD: 17.6 % — HIGH (ref 10.3–14.5)
SODIUM SERPL-SCNC: 146 MMOL/L — HIGH (ref 135–145)
WBC # BLD: 6.53 K/UL — SIGNIFICANT CHANGE UP (ref 3.8–10.5)
WBC # FLD AUTO: 6.53 K/UL — SIGNIFICANT CHANGE UP (ref 3.8–10.5)

## 2019-01-16 PROCEDURE — 80048 BASIC METABOLIC PNL TOTAL CA: CPT

## 2019-01-16 PROCEDURE — 99239 HOSP IP/OBS DSCHRG MGMT >30: CPT

## 2019-01-16 PROCEDURE — 97162 PT EVAL MOD COMPLEX 30 MIN: CPT

## 2019-01-16 PROCEDURE — 83735 ASSAY OF MAGNESIUM: CPT

## 2019-01-16 PROCEDURE — 93005 ELECTROCARDIOGRAM TRACING: CPT

## 2019-01-16 PROCEDURE — 97530 THERAPEUTIC ACTIVITIES: CPT

## 2019-01-16 PROCEDURE — 74176 CT ABD & PELVIS W/O CONTRAST: CPT

## 2019-01-16 PROCEDURE — 99285 EMERGENCY DEPT VISIT HI MDM: CPT | Mod: 25

## 2019-01-16 PROCEDURE — 96374 THER/PROPH/DIAG INJ IV PUSH: CPT

## 2019-01-16 PROCEDURE — 81001 URINALYSIS AUTO W/SCOPE: CPT

## 2019-01-16 PROCEDURE — 87086 URINE CULTURE/COLONY COUNT: CPT

## 2019-01-16 PROCEDURE — 82272 OCCULT BLD FECES 1-3 TESTS: CPT

## 2019-01-16 PROCEDURE — 87186 SC STD MICRODIL/AGAR DIL: CPT

## 2019-01-16 PROCEDURE — 85027 COMPLETE CBC AUTOMATED: CPT

## 2019-01-16 PROCEDURE — 99232 SBSQ HOSP IP/OBS MODERATE 35: CPT

## 2019-01-16 PROCEDURE — 80053 COMPREHEN METABOLIC PANEL: CPT

## 2019-01-16 PROCEDURE — 83690 ASSAY OF LIPASE: CPT

## 2019-01-16 PROCEDURE — 97116 GAIT TRAINING THERAPY: CPT

## 2019-01-16 PROCEDURE — 85610 PROTHROMBIN TIME: CPT

## 2019-01-16 PROCEDURE — 85730 THROMBOPLASTIN TIME PARTIAL: CPT

## 2019-01-16 PROCEDURE — 36415 COLL VENOUS BLD VENIPUNCTURE: CPT

## 2019-01-16 PROCEDURE — 96375 TX/PRO/DX INJ NEW DRUG ADDON: CPT

## 2019-01-16 RX ORDER — GABAPENTIN 400 MG/1
1 CAPSULE ORAL
Qty: 0 | Refills: 0 | COMMUNITY

## 2019-01-16 RX ORDER — WARFARIN SODIUM 2.5 MG/1
1 TABLET ORAL
Qty: 30 | Refills: 0 | OUTPATIENT
Start: 2019-01-16

## 2019-01-16 RX ORDER — WARFARIN SODIUM 2.5 MG/1
1 TABLET ORAL
Qty: 0 | Refills: 0 | COMMUNITY

## 2019-01-16 RX ADMIN — Medication 1 TABLET(S): at 12:17

## 2019-01-16 RX ADMIN — PIPERACILLIN AND TAZOBACTAM 25 GRAM(S): 4; .5 INJECTION, POWDER, LYOPHILIZED, FOR SOLUTION INTRAVENOUS at 05:53

## 2019-01-16 RX ADMIN — Medication 120 MILLIGRAM(S): at 05:53

## 2019-01-16 RX ADMIN — Medication 100 MILLIGRAM(S): at 05:53

## 2019-01-16 RX ADMIN — Medication 50 MILLIGRAM(S): at 05:53

## 2019-01-16 RX ADMIN — Medication 81 MILLIGRAM(S): at 12:17

## 2019-01-16 NOTE — DISCHARGE NOTE ADULT - MEDICATION SUMMARY - MEDICATIONS TO TAKE
I will START or STAY ON the medications listed below when I get home from the hospital:    aspirin 81 mg oral delayed release tablet  -- 1 tab(s) by mouth once a day  -- Indication: For PVD (peripheral vascular disease)    dilTIAZem 120 mg oral tablet  -- 1 tab(s) by mouth once a day  -- Indication: For Afib    Coumadin 3 mg oral tablet  -- 1 tab(s) by mouth once a day.  Start tomorrow 1/17/2019  -- Indication: For Afib    gabapentin 100 mg oral capsule  -- 1 cap(s) by mouth 3 times a day  -- Indication: For Neuropathy and pain control    atorvastatin 40 mg oral tablet  -- 1 tab(s) by mouth once a day (at bedtime)  -- Indication: For Hyperlipidemia    metoprolol succinate 100 mg oral tablet, extended release  -- 1 tab(s) by mouth once a day  -- Indication: For Afib    Augmentin 875 mg-125 mg oral tablet  -- 1 tab(s) by mouth every 12 hours  -- Indication: For Colitis    hydrALAZINE 50 mg oral tablet  -- 1 tab(s) by mouth 3 times a day  -- Indication: For Hypertension    multivitamin  --   once a day  -- Indication: For supplement

## 2019-01-16 NOTE — DISCHARGE NOTE ADULT - MEDICATION SUMMARY - MEDICATIONS TO CHANGE
I will SWITCH the dose or number of times a day I take the medications listed below when I get home from the hospital:    gabapentin 300 mg oral capsule  -- 1 cap(s) by mouth 3 times a day    warfarin 5 mg oral tablet  -- six days a week except Sunday

## 2019-01-16 NOTE — DISCHARGE NOTE ADULT - ADDITIONAL INSTRUCTIONS
-Please follow up with your primary doctor within one week.  - INR checks  -Patient and family to set up follow up appointments.  -Continue taking your medications as directed above.  -If symptoms persist/worsen, please call your PMD or return to the ED. -Please follow up with your primary doctor within one week.  - INR checks  -Continue taking your medications as directed above.  -If symptoms persist/worsen, please call your PMD or return to the ED.

## 2019-01-16 NOTE — DISCHARGE NOTE ADULT - CARE PROVIDER_API CALL
Tahir Lockhart (DO), Family Medicine  [address] [place] [address]  [address]  [place] [address]  Phone: [phone]  Fax: [phone]

## 2019-01-16 NOTE — DISCHARGE NOTE ADULT - PLAN OF CARE
resolution You had colitis which was treated with antibiotics. stable HOLD COUMADIN TONIGHT. RESTART 3mg TOMORROW  continue cardizem 120mg PO daily and Toprol xl 100mg daily. Stable. Outpatient follow up continue home medications continue statin resolution.  Complete course of antibiotic. You had colitis which was treated with antibiotics.  Please follow up with PMD in 1 week.  Activity as tolerable. HOLD COUMADIN TONIGHT. RESTART 3mg TOMORROW  continue cardizem 120mg PO daily and Toprol xl 100mg daily.  Please check your INR with your PMD

## 2019-01-16 NOTE — PROGRESS NOTE ADULT - ASSESSMENT
84 yo female w/ PMH of PAF AC, HFPEF, COPD, CVA and hx of aneurysm repair, HTN, HLD, mitral valve prolapse, PAD s/p bypass/stents, with residual non revascularizable disease presents with colitis    - PAF  - Maintaining SR with no evidence of arrhythmia  - Cont Toprol 100mg HS  - Cont Cardizem [id number] Qday  - Continue to hold Coumadin as INR remains at a supratherapeutic level.  Yesterday's = 4.57.  Today's level is pending  - Monitor for s/s bleeding  - Please resume when INR ~ 2    - Appears compensated from HF POV.   - Continue to hold diuretics  - Encourage PO intake  - Monitor closely for volume overload    - Cont asa and statin  - BP relatively controlled. Cont hydralazine.  May increase as needed    - Abx per Primary  - DVT ppx with SCD    - Monitor and replete electrolytes. Keep K>4.0 and Mg>2.0.   - Further cardiac workup will depend on clinical course.   - All other workup per primary team. Will followup.     Isabel Quintero NP  Cardiology 84 yo female w/ PMH of PAF AC, HFPEF, COPD, CVA and hx of aneurysm repair, HTN, HLD, mitral valve prolapse, PAD s/p bypass/stents, with residual non revascularizable disease presents with colitis  Maintaining SR with no evidence of arrhythmia; Appears compensated from HF POV.     - Cont Toprol 100mg HS  - Cont Cardizem [id number] Qday  - Continue to hold Coumadin as INR remains at a supratherapeutic level.  Yesterday's = 4.57.  Today's level is pending  - Monitor for s/s bleeding  - Please resume warfarin when INR ~ 2  - Abx per med  - cont hydralazine  - Continue to hold diuretics  - Encourage PO intake  - Monitor closely for volume overload  - Cont asa and statin  - DVT ppx with SCD  - Monitor and replete electrolytes. Keep K>4.0 and Mg>2.0.   - Further cardiac workup will depend on clinical course.   - All other workup per primary team. Will followup.     Isabel Quintero NP  Cardiology

## 2019-01-16 NOTE — DISCHARGE NOTE ADULT - HOSPITAL COURSE
84 yo female w/ PMH of afib on coumadin, CVA and hx of aneurysm repair, HTN, HLD, mitral valve prolapse, PVD s/p stents x 3 (RLE), rheumatoid arthritis, cataracts of the L eye presented with abdominal pain and nausea/ vomiting for 1 day admitted with colitis. CT abdomen and pelvis shows left-sided colitis, Sigmoid diverticulosis is noted. Pt started on  IV flagyl and ciprofloxacin. Pt was kept NPO and diet was advanced as tolerated. H/H stable. FOBT negative. zofran for PRN nausea/ vomiting. Pt had supratherapeutic INR, warfarin was held. Urine cx positive for e coli. started on Zosyn to continue augmentin at home. Pt seen by PT advised outpatient PT. Pt seen by cardio, diuretics held. INR trended down to 4, continue 3mg coumadin tomorrow.       Patient improved clinically throughout hospital course. Patient seen and examined on day of discharge.    Vital Signs Last 24 Hrs  T(C): 36.7 (16 Jan 2019 13:24), Max: 36.7 (16 Jan 2019 13:24)  T(F): 98 (16 Jan 2019 13:24), Max: 98 (16 Jan 2019 13:24)  HR: 74 (16 Jan 2019 13:24) (65 - 74)  BP: 147/81 (16 Jan 2019 13:24) (131/69 - 159/83)  BP(mean): --  RR: 16 (16 Jan 2019 13:24) (16 - 16)  SpO2: 97% (16 Jan 2019 13:24) (95% - 98%)    Physical Exam:  GENERAL: NAD  HEENT: EOMI, hearing normal, conjunctiva and sclera clear  Chest: CTA bilaterally, no wheezing  CV: S1S2, RRR  GI: soft, +BS, NT/ND  Musculoskeletal: no edema  Psychiatric: affect nL, mood nL  Skin: warm and dry    Patient is medically stable for discharge to home with outpatient follow up 84 yo female w/ PMH of afib on coumadin, CVA and hx of aneurysm repair, HTN, HLD, mitral valve prolapse, PVD s/p stents x 3 (RLE), rheumatoid arthritis, cataracts of the L eye presented with abdominal pain and nausea/ vomiting for 1 day admitted with colitis. CT abdomen and pelvis shows left-sided colitis, Sigmoid diverticulosis is noted. Pt started on  IV flagyl and ciprofloxacin. Pt was kept NPO and diet was advanced as tolerated. H/H stable. FOBT negative. zofran for PRN nausea/ vomiting. Pt had supratherapeutic INR, warfarin was held. Urine cx positive for e coli. started on Zosyn to continue augmentin at home. Pt seen by PT advised outpatient PT. Pt seen by cardio, diuretics held. INR trended down to 4, continue 3mg coumadin tomorrow.     Patient improved clinically throughout hospital course. Patient seen and examined on day of discharge.    Vital Signs Last 24 Hrs  T(C): 36.7 (16 Jan 2019 13:24), Max: 36.7 (16 Jan 2019 13:24)  T(F): 98 (16 Jan 2019 13:24), Max: 98 (16 Jan 2019 13:24)  HR: 74 (16 Jan 2019 13:24) (65 - 74)  BP: 147/81 (16 Jan 2019 13:24) (131/69 - 159/83)  BP(mean): --  RR: 16 (16 Jan 2019 13:24) (16 - 16)  SpO2: 97% (16 Jan 2019 13:24) (95% - 98%)    Physical Exam:  GENERAL: NAD  HEENT: EOMI, hearing normal, conjunctiva and sclera clear  Chest: CTA bilaterally, no wheezing  CV: S1S2, RRR  GI: soft, +BS, NT/ND  Musculoskeletal: no edema  Psychiatric: affect nL, mood nL  Skin: warm and dry    Patient is medically stable for discharge to home with outpatient follow up.    Completion of discharge in 32 minutes.

## 2019-01-16 NOTE — DISCHARGE NOTE ADULT - PATIENT PORTAL LINK FT
You can access the [facility] Patient Portal, offered by [facility], by registering with the following website: [email or web address]

## 2019-01-16 NOTE — PROGRESS NOTE ADULT - ATTENDING COMMENTS
Chart reviewed    Patient seen and examined    Agree with plan as outlined above
Pt. seen and evaluated for colitis.  Pt. reports feeling better today with no further abdominal pain.  Tolerating IV antibiotics.  Physical examination as above.      Plan:  -Colitis:  continue IV Cipro and Flagyl.  Check stool cx.  Advance to low fiber diet.    -Afib:  continue Cardizem CD 120mg PO daily and toprol XL 100mg PO daily.  holding coumadin for supra-therapeutic INR  -Anemia:  stable.  Will continue to monitor  -HTN:  continue Cardizem CD, hydralazine, and Toprol XL  -HLD: continue statin therapy  -PVD: continue ASA and Lipitor  -AAA: stable  -VTE ppx: SCD
Pt. seen and evaluated for colitis.  Pt. reports tolerating advancement of diet.  However, states she feels weak and lethargic.  Tolerating IV antibiotic.  Physical examination as above.      Plan:  -Colitis:  Switch to IV Zosyn.  Continue low fiber diet and 1/2NS@60cc/hr x 12 hours.  Check stool culture if patient has any diarrhea.    -UTI:  Urine cx + E. coli.  started on IV Zosyn.    -Afib:  continue Cardizem CD 120mg PO daily and Toprol XL 100mg PO daily.  Holding coumadin for supra-therapeutic INR  -Anemia:  stable.  Will continue to monitor  -HTN:  continue Cardizem CD, hydralazine, and Toprol XL  -HLD: continue statin therapy  -PVD: continue ASA and Lipitor  -AAA: stable  -VTE ppx: SCD

## 2019-01-16 NOTE — DISCHARGE NOTE ADULT - CARE PLAN
Principal Discharge DX:	Colitis  Goal:	resolution  Assessment and plan of treatment:	You had colitis which was treated with antibiotics.  Secondary Diagnosis:	Afib  Goal:	stable  Assessment and plan of treatment:	HOLD COUMADIN TONIGHT. RESTART 3mg TOMORROW  continue cardizem 120mg PO daily and Toprol xl 100mg daily.  Secondary Diagnosis:	Anemia  Goal:	stable  Assessment and plan of treatment:	Stable. Outpatient follow up  Secondary Diagnosis:	Hypertension  Goal:	stable  Assessment and plan of treatment:	continue home medications  Secondary Diagnosis:	Hyperlipidemia  Goal:	stable  Assessment and plan of treatment:	continue statin Principal Discharge DX:	Colitis  Goal:	resolution.  Complete course of antibiotic.  Assessment and plan of treatment:	You had colitis which was treated with antibiotics.  Please follow up with PMD in 1 week.  Activity as tolerable.  Secondary Diagnosis:	Afib  Goal:	stable  Assessment and plan of treatment:	HOLD COUMADIN TONIGHT. RESTART 3mg TOMORROW  continue cardizem 120mg PO daily and Toprol xl 100mg daily.  Please check your INR with your PMD  Secondary Diagnosis:	Anemia  Goal:	stable  Assessment and plan of treatment:	Stable. Outpatient follow up  Secondary Diagnosis:	Hypertension  Goal:	stable  Assessment and plan of treatment:	continue home medications  Secondary Diagnosis:	Hyperlipidemia  Goal:	stable  Assessment and plan of treatment:	continue statin

## 2019-01-16 NOTE — PROGRESS NOTE ADULT - SUBJECTIVE AND OBJECTIVE BOX
[facility] -- Dahiana Bañuelos, Toney Horner, Truman Falcon Savella  Office # [phone]    Follow Up:  PAF, supratherapeutic INR    Subjective/Observations: Awake and alert, denies CP or palpitations.  Comfortable on RA, denies SOB, CRABTREE, or PND.    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  Osteoarthritis  Afib  PVD (peripheral vascular disease)  Neuropathy: (Right lower leg)  H/O cerebral aneurysm repair: brain clips  CVA (cerebral vascular accident): (&quot;Mini-stroke&quot;,1990&#x27;s)  Rheumatoid arthritis  Hyperlipidemia  Hypertension  S/P cataract surgery: (Left eye)  Elective surgery: (&quot;Twisted bowel&quot;, 2014)  Elective surgery: (Exision of cyst on liver, 1985)  S/P ORIF (open reduction internal fixation) fracture: Left hip fx (2012) &amp; R hip fx (2013)  H/O cerebral aneurysm repair: Brain clips (1978(  Renal stone: Cysto stent placement 10/1/2014  PVD (peripheral vascular disease): s/p RLE bypass x 3, most recent 3/2012 Right external iliac to PT bypass w/ PTFE (2012)  S/P FRED (total abdominal hysterectomy): (1987, Hx of &quot;ovarian cancer?&quot;)    MEDICATIONS  (STANDING):  aspirin enteric coated 81 milliGRAM(s) Oral daily  atorvastatin 40 milliGRAM(s) Oral at bedtime  diltiazem    milliGRAM(s) Oral daily  hydrALAZINE 50 milliGRAM(s) Oral three times a day  metoprolol succinate  milliGRAM(s) Oral daily  multivitamin 1 Tablet(s) Oral daily  piperacillin/tazobactam IVPB. 3.375 Gram(s) IV Intermittent every 8 hours  sodium chloride 0.45%. 1000 milliLiter(s) (60 mL/Hr) IV Continuous <Continuous>    MEDICATIONS  (PRN):  acetaminophen   Tablet .. 650 milliGRAM(s) Oral every 6 hours PRN Mild Pain (1 - 3)  HYDROmorphone  Injectable 0.5 milliGRAM(s) IV Push every 4 hours PRN Severe Pain (7 - 10)  morphine  - Injectable 2 milliGRAM(s) IV Push every 4 hours PRN Moderate Pain (4 - 6)  ondansetron Injectable 4 milliGRAM(s) IV Push every 6 hours PRN Nausea  ondansetron Injectable 4 milliGRAM(s) IV Push every 6 hours PRN Nausea and/or Vomiting    Allergies    No Known Allergies    Intolerances    Vital Signs Last 24 Hrs  T(C): 36.5 (16 Jan 2019 05:09), Max: 36.7 (15 Luis M 2019 13:44)  T(F): 97.7 (16 Jan 2019 05:09), Max: 98.1 (15 Luis M 2019 13:44)  HR: 69 (16 Jan 2019 05:09) (65 - 69)  BP: 159/83 (16 Jan 2019 05:09) (131/69 - 159/83)  BP(mean): --  RR: 16 (16 Jan 2019 05:09) (16 - 16)  SpO2: 98% (16 Jan 2019 05:09) (95% - 98%)    I&O's Summary    15 Luis M 2019 07:01  -  16 Jan 2019 07:00  --------------------------------------------------------  IN: 200 mL / OUT: 400 mL / NET: -200 mL    PHYSICAL EXAM:  TELE: Not on tele  Constitutional: NAD, awake and alert, well-developed  HEENT: Moist Mucous Membranes, Anicteric  Pulmonary: Non-labored, breath sounds are clear bilaterally, No wheezing, rales or rhonchi  Cardiovascular: Regular, S1 and S2, + murmurs.  No rubs, gallops or clicks  Gastrointestinal: Bowel Sounds present, soft, nontender.   Lymph: No peripheral edema. No lymphadenopathy.  Skin: No visible rashes or ulcers.  Psych:  Mood & affect appropriate    LABS: All Labs Reviewed:                        9.1    7.28  )-----------( 226      ( 15 Luis M 2019 08:54 )             30.0                         8.0    8.49  )-----------( 202      ( 14 Jan 2019 07:58 )             26.6     15 Luis M 2019 08:54    146    |  113    |  25     ----------------------------<  103    3.4     |  25     |  1.10   14 Jan 2019 07:58    147    |  115    |  29     ----------------------------<  91     3.6     |  25     |  1.10     Ca    7.8        15 Luis M 2019 08:54  Ca    7.5        14 Jan 2019 07:58  Mg     1.8       15 Luis M 2019 08:54    TPro  5.4    /  Alb  2.5    /  TBili  0.6    /  DBili  x      /  AST  16     /  ALT  17     /  AlkPhos  57     14 Jan 2019 07:58    PT/INR - ( 15 Luis M 2019 08:54 )   PT: 54.1 sec;   INR: 4.57 ratio         < from: TTE Echo Doppler w/o Cont (02.05.18 @ 08:11) >     EXAM:  ECHO TTE W/O CON COMP W/DOPPLR       PROCEDURE DATE:  02/05/2018      INTERPRETATION:  INDICATION: respiratory failure  Referring M.D.:Lona  Blood Pressure 126/59        Weight (kg) :49     Height (cm):157       BSA (sq m): 1.47  Technician: JOJO    Dimensions:    LA 3.4       Normal Values: 2.0 - 4.0 cm    Ao 3.0        Normal Values: 2.0 - 3.8 cm  SEPTUM 0.8       Normal Values: 0.6 - 1.2 cm  PWT 0.8       Normal Values: 0.6 - 1.1 cm  LVIDd 3.4         Normal Values: 3.0 - 5.6 cm  LVIDs 2.3         Normal Values: 1.8 - 4.0 cm    OBSERVATIONS:  Technically difficult bedside ICU study. The patient is vented  Mitral Valve: Calcified mitral annulus and mitral valve leaflets with   normal opening. Mild mitral regurgitation.  Aortic Valve/Aorta: Calcified trileaflet aortic valve with normal opening.  Tricuspid Valve: Calcified trileaflet cusp at annulus with normally   opening valve leaflets. Mild tricuspid regurgitation.  Pulmonic Valve: The pulmonic valve is not well visualized. Probably   normal. Mild PI  Left Atrium: Small to moderate left atrial enlargement  Right Atrium: Normal  Left Ventricle: The endocardium is not well-visualized. There appears to   be grossly preserved left ventricular systolic function. There appears to   be concentric left ventricular hypertrophy. The EF is approximately 65%.  Right Ventricle: Normal right ventricular size and function. There is a   device wire noted in the right heart  Pericardium/Pleura: No pericardial effusion noted. Left pleural effusion   noted  Pulmonary/RV Pressure: The right ventricular systolic pressure is   estimated to be 41mmHg, assuming that the right atrial pressure is   estimated to be 8 mmHg. This is consistent with mild pulmonary   hypertension.  LV Diastolic Function: Stage II diastolic dysfunction    Conclusion: Likely difficult and limited study. Grossly preserved left   ventricular systolic function. Sensation diastolic dysfunction. Left   pleural effusion is noted.      KIET NIETO M.D., ATTENDING CARDIOLOGIST  This document has been electronically signed. Feb 6 2018  7:49PM      < end of copied text >

## 2019-01-23 ENCOUNTER — RESULT REVIEW (OUTPATIENT)
Age: 84
End: 2019-01-23

## 2019-01-25 ENCOUNTER — NON-APPOINTMENT (OUTPATIENT)
Age: 84
End: 2019-01-25

## 2019-01-25 ENCOUNTER — APPOINTMENT (OUTPATIENT)
Dept: CARDIOLOGY | Facility: CLINIC | Age: 84
End: 2019-01-25
Payer: MEDICARE

## 2019-01-25 VITALS
BODY MASS INDEX: 20.96 KG/M2 | SYSTOLIC BLOOD PRESSURE: 145 MMHG | HEART RATE: 63 BPM | HEIGHT: 61 IN | OXYGEN SATURATION: 97 % | WEIGHT: 111 LBS | DIASTOLIC BLOOD PRESSURE: 65 MMHG

## 2019-01-25 VITALS — SYSTOLIC BLOOD PRESSURE: 138 MMHG | DIASTOLIC BLOOD PRESSURE: 68 MMHG

## 2019-01-25 LAB — INR PPP: 1.93

## 2019-01-25 PROCEDURE — 99214 OFFICE O/P EST MOD 30 MIN: CPT

## 2019-01-25 PROCEDURE — 93000 ELECTROCARDIOGRAM COMPLETE: CPT

## 2019-01-25 NOTE — HISTORY OF PRESENT ILLNESS
[FreeTextEntry1] : 83 year-old woman with a history of hypercholesterolemia, hypertension, rheumatoid arthritis, cerebral aneurysm status post clipping, PAD status post femoropopliteal bypass with subsequent right leg femoropopliteal bypass thrombosis and infected wound, redo bypass with failure, then PCI with failure, paroxysmal atrial fibrillation previously on AC with Eliquis which was converted to Coumadin after an episode of UGIB. She had admission to [facility] where she had significant abdominal pain requiring a emergent laparotomy and reduction of closed loop  with short segment of SB excised. her course was complicated by rapid atrial fibrillation and mild heart failure. He was also complicated by bleeding from the anastomosis site in the setting of an increased INR. She was stabilized and then subsequently sent to rehabilitation. Then she had a fall with a hip fracture from a mechanical fall s/p ORIF. She is now walking with a walker.\par In 11/2017 she was hospitalized for a gangrenous right toe with possible signs of osteomyelitis (no MR 2/2 brain clippings). He had a long and complicated hospital coarse. Podiatry recommended a amputation. An IR angiogram showed multilevel occlusions involving the right common femoral artery, the \par superficial femoral artery and popliteal artery with collateral vessels feeding single vessel below knee. Another bypass was considered to high risk and likely to have poor outcomes. She was offered an AKA but refused. She eneded up getting an amputation of her toe. Her course was complicated by anemia and also multiple episodes of AF with RVR. \par She was readmitted to  for for significant diarrhea leading to shock and respiratory failure. She was significantly bradycardic requiring a TVP. Once her metabolic derangement improved she returned to SR with intermittent PAF.   \par \par  she was admitted to  in summer on 2018 for a prolong stay for  a UTI and infected gallstone requiring an ERCP. \par \par She now presents for a followup visit. \par Since her last visit, she was admitted to  in 1/2019 for colitis. she was treated with antibiotics and her symptoms resolved. \par Since discharge, she is complaining of feeling tired.  She feels that she is not do her same activities without fatigue. Though she is able to do her ADLS. \par \par Her arthritis has has worsened.  \par She is walking in the house with the walker and not having much an issues. No claudication symptoms.  \par  \par She denies any blurry vision, headaches or recent stroke like symptoms. \par She   denies any chest pain, dyspnea, PND, orthopnea, near syncope, syncope, strokelike symptoms. Her lower extremity has resolved. \par She is compliant with her medications. \par No reported melena, hematochezia or hematemesis.  \par \par Her blood pressure range at home are reportedly normal.

## 2019-01-25 NOTE — DISCUSSION/SUMMARY
[FreeTextEntry1] : 83-year-old woman with a history as listed above who presents today for a followup cardiac evaluation.\par Celia is doing well . She appears currently stable from a cardiovascular standpoint. She denies any anginal symptoms. Clinically she is euvolemic and not in heart failure. Her EKG shows that she is maintaining sinus rhythm. Her EKG did not reveal any significant ischemic changes. \par \par Her blood pressure is  controlled.   Her HR is controlled. I will continue her Hydralazine 50mg q8. She will continue with  Diltizam 120mg Qday. She is feeling more faitgue which I think is multifactorial and likely from her comorbidities. She will switch her Toprol 100mg to HS. \par \par Her PAD is stable. Though she is now having rest pain and leg fatigue which is likely a function of her activities increasing in the setting of her severe PAD. Her wounds have healed.  If she gets any further gangrenous issues on the right foot she may be forced to have an AKA in the future. She has virtually no targets for another bypass. Followup with Dr. Araya. I have encouraged more walking. Unfortunately more antiplatelet therapy is not advised given increased risk of bleeding. She will continue with aspirin 81 mg daily.\par \par She will continue with Coumadin for stroke risk reduction. She will monitor for signs of bleeding.  We'll try to keep her INR between 2-3. She will monitor for signs of GIB. \par \par  She will continue Lipitor 40 mg daily. Her goal LDL is <70. \par \par  Her last echo in the hospital did not show any severe valvular disease. \par \par She will followup with me in 3-4 month  time. She will followup with you for further medical needs.

## 2019-01-25 NOTE — PHYSICAL EXAM
[Normal Appearance] : normal appearance [General Appearance - In No Acute Distress] : no acute distress [Normal Conjunctiva] : the conjunctiva exhibited no abnormalities [Normal Oral Mucosa] : normal oral mucosa [No Oral Pallor] : no oral pallor [Normal Jugular Venous V Waves Present] : normal jugular venous V waves present [Respiration, Rhythm And Depth] : normal respiratory rhythm and effort [Exaggerated Use Of Accessory Muscles For Inspiration] : no accessory muscle use [Auscultation Breath Sounds / Voice Sounds] : lungs were clear to auscultation bilaterally [Bowel Sounds] : normal bowel sounds [Abdomen Soft] : soft [Abdomen Tenderness] : non-tender [FreeTextEntry1] : swan neck deformities in hands [] : no rash [Skin Lesions] : no skin lesions [Oriented To Time, Place, And Person] : oriented to person, place, and time [Affect] : the affect was normal [Mood] : the mood was normal [Normal Rate] : normal [Rhythm Regular] : regular [Normal S1] : normal S1 [Normal S2] : normal S2 [No Gallop] : no gallop heard [I] : a grade 1 [Right Carotid Bruit] : no bruit heard over the right carotid [Left Carotid Bruit] : no bruit heard over the left carotid [0] : right 0 [1+] : left 1+ [___ +] : bilateral [unfilled]U+ pitting edema to the ankles [Rt] : varicose veins of the right leg noted [Lt] : varicose veins of the left leg noted

## 2019-02-01 NOTE — ED ADULT TRIAGE NOTE - CADM TRG TX PRIOR TO ARRIVAL
ROS/Med Hx    Past Surgical History:   Procedure Laterality Date   •  SECTION     • DILATION AND CURETTAGE, DIAGNOSTIC / THERAPEUTIC     • FRACTURE SURGERY      right hip   • FRACTURE SURGERY      right wrist   • HIP SURGERY  2018       Physical Exam    Airway   Mallampati: III   TM distance: >3 FB   Neck ROM: full  Cardiovascular    Rhythm: regular   Rate: normal  Pulmonary - normal   clear to auscultation        Anesthesia Plan    Plan: general    Technique: general endotracheal     Airway: oral intubation and direct visual laryngoscopy   ASA 2  Anesthetic plan and risks discussed with: patient  Induction:    intravenous   Postop Plan:   Patient Disposition: phase II then home   Pain Management: IV analgesics    
none

## 2019-02-13 LAB — INR PPP: 2.4

## 2019-02-13 NOTE — ED ADULT NURSE NOTE - CAS EDN INTEG ASSESS
Physician Discharge Summary     Patient ID:  Heather Roche  [id number]  67 year old  1951    Admit date: 2/12/2019    Discharge date and time: 2/13/2019    Admitting Physician: Ruth Coulter MD     Discharge Physician: Ruth Coulter M.D.    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    Hospital Course: Surgery performed 2/12/19: Bilateral mastectomies and right sentinel lymph node biopsy, and immediate bilateral reconstruction with placement of prepectoral expanders.    Consults: Dr. Bonner, plastic surgery    Significant Diagnostic Studies: Pathology pending    Treatments: surgery: Bilateral mastectomies, right sentinel lymph node biopsy, and immediate bilateral breast reconstruction with placement of prepectoral expanders    Discharge Exam:  Visit Vitals  /76 (BP Location: LUE, Patient Position: Semi-Hernandez's)   Pulse 74   Temp 97.7 °F (36.5 °C) (Oral)   Resp 16   Ht 5' 4.96\" (1.65 m)   Wt 63.8 kg   SpO2 95%   BMI 23.43 kg/m²     The patient is alert and oriented, in good spirits. She denies pain. She took Percocet last evening and has not needed any additional analgesics. Alopecia noted due to neoadjuvant chemotherapy. She has some residual peripheral neuropathy. She is eating without difficulty.  She denies nausea or vomiting. She is up without assistance. Drains are patent with serosanguineous drainage in all bulbs:  Left #1: 100 cc yesterday, 17 cc today  Left #2: 35 cc yesterday, 15 cc today  Right #1: 125 cc yesterday, 30 cc today  Right #2: 20 cc yesterday, 10 cc today    Prevena Duo negative pressure dressings intact. Dressings intact over bilateral reconstructed breasts. There is no erythema, induration, ecchymosis, or evidence of delayed healing.    Disposition: Home with home health care    Patient Instructions:   Activity: activity as tolerated  Diet: resume prior diet  Wound Care: Follow Dr. Bonner's orders regarding suction device, drains, and dressings  The patient's  will be assisting with drain care at home. Visiting nurse service is being arranged.    Follow-up with Dr. Bonner on 2/19/19, Dr. Coulter on 2/20/19, Dr. Silva on 4/3/19. Next chemotherapy treatment due 2/20/19 (trastuzumab and a pertuzumab).       WDL

## 2019-02-25 ENCOUNTER — RX RENEWAL (OUTPATIENT)
Age: 84
End: 2019-02-25

## 2019-02-27 LAB — INR PPP: 6.3

## 2019-03-03 LAB — INR PPP: 6.63

## 2019-03-05 ENCOUNTER — NON-APPOINTMENT (OUTPATIENT)
Age: 84
End: 2019-03-05

## 2019-03-05 ENCOUNTER — APPOINTMENT (OUTPATIENT)
Dept: CARDIOLOGY | Facility: CLINIC | Age: 84
End: 2019-03-05
Payer: MEDICARE

## 2019-03-05 VITALS
HEART RATE: 88 BPM | SYSTOLIC BLOOD PRESSURE: 153 MMHG | OXYGEN SATURATION: 95 % | DIASTOLIC BLOOD PRESSURE: 68 MMHG | WEIGHT: 105 LBS | BODY MASS INDEX: 19.84 KG/M2

## 2019-03-05 VITALS — SYSTOLIC BLOOD PRESSURE: 138 MMHG | DIASTOLIC BLOOD PRESSURE: 64 MMHG

## 2019-03-05 LAB — INR PPP: 1.4

## 2019-03-05 PROCEDURE — 99214 OFFICE O/P EST MOD 30 MIN: CPT

## 2019-03-05 PROCEDURE — 93000 ELECTROCARDIOGRAM COMPLETE: CPT

## 2019-03-05 NOTE — DISCUSSION/SUMMARY
[FreeTextEntry1] : 83-year-old woman with a history as listed above who presents today for a followup cardiac evaluation.\nile Yang is complaining of more fatigue which i think has to do with her poor nutritional status. She will increase her PO intake and calorie count. \par \par  She appears currently stable from a cardiovascular standpoint. She denies any anginal symptoms. Clinically she is euvolemic and not in heart failure. Her EKG shows that she is maintaining sinus rhythm. Her EKG did not reveal any significant ischemic changes. \par \par Her blood pressure is  slightly elevated. I am worried about adjusting her medications because of her nutrition issues.   Her HR is controlled. I will continue her Hydralazine 50mg q8. She will continue with  Diltizam 120mg Qday. She is feeling more faitgue which I think is multifactorial and likely from her comorbidities. She will switch her Toprol 100mg to HS. \par \par Her PAD is stable. Though she is now having rest pain and leg fatigue which is likely a function of her activities increasing in the setting of her severe PAD. Her wounds have healed.  If she gets any further gangrenous issues on the right foot she may be forced to have an AKA in the future. She has virtually no targets for another bypass. Followup with Dr. Araya. I have encouraged more walking. Unfortunately more antiplatelet therapy is not advised given increased risk of bleeding. She will continue with aspirin 81 mg daily.\par \par She will continue with Coumadin for stroke risk reduction. She will monitor for signs of bleeding.  We'll try to keep her INR between 2-3. She will monitor for signs of GIB. \par \par  She will continue Lipitor 40 mg daily. Her goal LDL is <70. \par \par  Her last echo in the hospital did not show any severe valvular disease. \par \par She will followup with me in 3-4 month  time. She will followup with you for further medical needs.

## 2019-03-05 NOTE — HISTORY OF PRESENT ILLNESS
[FreeTextEntry1] : 83 year-old woman with a history of hypercholesterolemia, hypertension, rheumatoid arthritis, cerebral aneurysm status post clipping, PAD status post femoropopliteal bypass with subsequent right leg femoropopliteal bypass thrombosis and infected wound, redo bypass with failure, then PCI with failure, paroxysmal atrial fibrillation previously on AC with Eliquis which was converted to Coumadin after an episode of UGIB. She had admission to [facility] where she had significant abdominal pain requiring a emergent laparotomy and reduction of closed loop  with short segment of SB excised. her course was complicated by rapid atrial fibrillation and mild heart failure. He was also complicated by bleeding from the anastomosis site in the setting of an increased INR. She was stabilized and then subsequently sent to rehabilitation. Then she had a fall with a hip fracture from a mechanical fall s/p ORIF. She is now walking with a walker.\par In 11/2017 she was hospitalized for a gangrenous right toe with possible signs of osteomyelitis (no MR 2/2 brain clippings). He had a long and complicated hospital coarse. Podiatry recommended a amputation. An IR angiogram showed multilevel occlusions involving the right common femoral artery, the \par superficial femoral artery and popliteal artery with collateral vessels feeding single vessel below knee. Another bypass was considered to high risk and likely to have poor outcomes. She was offered an AKA but refused. She eneded up getting an amputation of her toe. Her course was complicated by anemia and also multiple episodes of AF with RVR. \par She was readmitted to  for for significant diarrhea leading to shock and respiratory failure. She was significantly bradycardic requiring a TVP. Once her metabolic derangement improved she returned to SR with intermittent PAF.   \par \par  she was admitted to  in summer on 2018 for a prolong stay for  a UTI and infected gallstone requiring an ERCP. \par she was admitted to  in 1/2019 for colitis. she was treated with antibiotics and her symptoms resolved. \par \par She now presents for a followup visit. \par Since her last visit, she has been still complaining of feeling tired. She is not keeping up with her caloric needs. She has lost another 5 pounds. \par  She feels that she is not do her same activities without fatigue. She has been more sedentary since the weather has improved. Though she is able to do her ADLS. She is walking in the house with the walker and not having much an issues. No claudication symptoms.  \par  \par Her main compliant is that she is incontinent at night. \par She denies any blurry vision, headaches or recent stroke like symptoms. \par She   denies any chest pain, dyspnea, PND, orthopnea,   syncope, strokelike symptoms. Her lower extremity has resolved. She intermittently gets lightheaded standing. \par She is compliant with her medications. \par No reported melena, hematochezia or hematemesis.  \par She has not been checking her BP at home. \par

## 2019-03-06 ENCOUNTER — RX RENEWAL (OUTPATIENT)
Age: 84
End: 2019-03-06

## 2019-03-06 ENCOUNTER — MEDICATION RENEWAL (OUTPATIENT)
Age: 84
End: 2019-03-06

## 2019-03-14 ENCOUNTER — INPATIENT (INPATIENT)
Facility: HOSPITAL | Age: 84
LOS: 2 days | Discharge: HOME | DRG: 689 | End: 2019-03-17
Attending: INTERNAL MEDICINE | Admitting: HOSPITALIST
Payer: MEDICARE

## 2019-03-14 VITALS
DIASTOLIC BLOOD PRESSURE: 71 MMHG | SYSTOLIC BLOOD PRESSURE: 159 MMHG | RESPIRATION RATE: 16 BRPM | WEIGHT: 106.92 LBS | TEMPERATURE: 98 F | HEART RATE: 77 BPM | HEIGHT: 61 IN | OXYGEN SATURATION: 95 %

## 2019-03-14 DIAGNOSIS — N39.0 URINARY TRACT INFECTION, SITE NOT SPECIFIED: ICD-10-CM

## 2019-03-14 DIAGNOSIS — Z96.7 PRESENCE OF OTHER BONE AND TENDON IMPLANTS: Chronic | ICD-10-CM

## 2019-03-14 DIAGNOSIS — Z98.49 CATARACT EXTRACTION STATUS, UNSPECIFIED EYE: Chronic | ICD-10-CM

## 2019-03-14 DIAGNOSIS — Z41.9 ENCOUNTER FOR PROCEDURE FOR PURPOSES OTHER THAN REMEDYING HEALTH STATE, UNSPECIFIED: Chronic | ICD-10-CM

## 2019-03-14 DIAGNOSIS — N20.0 CALCULUS OF KIDNEY: Chronic | ICD-10-CM

## 2019-03-14 DIAGNOSIS — Z98.89 OTHER SPECIFIED POSTPROCEDURAL STATES: Chronic | ICD-10-CM

## 2019-03-14 LAB
ALBUMIN SERPL ELPH-MCNC: 3.3 G/DL — SIGNIFICANT CHANGE UP (ref 3.3–5)
ALP SERPL-CCNC: 69 U/L — SIGNIFICANT CHANGE UP (ref 40–120)
ALT FLD-CCNC: 22 U/L — SIGNIFICANT CHANGE UP (ref 12–78)
ANION GAP SERPL CALC-SCNC: 8 MMOL/L — SIGNIFICANT CHANGE UP (ref 5–17)
APPEARANCE UR: ABNORMAL
APTT BLD: 35.7 SEC — SIGNIFICANT CHANGE UP (ref 28.5–37)
AST SERPL-CCNC: 22 U/L — SIGNIFICANT CHANGE UP (ref 15–37)
BASOPHILS # BLD AUTO: 0.04 K/UL — SIGNIFICANT CHANGE UP (ref 0–0.2)
BASOPHILS NFR BLD AUTO: 0.6 % — SIGNIFICANT CHANGE UP (ref 0–2)
BILIRUB SERPL-MCNC: 0.4 MG/DL — SIGNIFICANT CHANGE UP (ref 0.2–1.2)
BILIRUB UR-MCNC: NEGATIVE — SIGNIFICANT CHANGE UP
BUN SERPL-MCNC: 29 MG/DL — HIGH (ref 7–23)
CALCIUM SERPL-MCNC: 8.3 MG/DL — LOW (ref 8.5–10.1)
CHLORIDE SERPL-SCNC: 108 MMOL/L — SIGNIFICANT CHANGE UP (ref 96–108)
CO2 SERPL-SCNC: 27 MMOL/L — SIGNIFICANT CHANGE UP (ref 22–31)
COLOR SPEC: YELLOW — SIGNIFICANT CHANGE UP
CREAT SERPL-MCNC: 1.3 MG/DL — SIGNIFICANT CHANGE UP (ref 0.5–1.3)
DIFF PNL FLD: ABNORMAL
EOSINOPHIL # BLD AUTO: 0.26 K/UL — SIGNIFICANT CHANGE UP (ref 0–0.5)
EOSINOPHIL NFR BLD AUTO: 4 % — SIGNIFICANT CHANGE UP (ref 0–6)
GLUCOSE SERPL-MCNC: 103 MG/DL — HIGH (ref 70–99)
GLUCOSE UR QL: NEGATIVE — SIGNIFICANT CHANGE UP
HCT VFR BLD CALC: 27.6 % — LOW (ref 34.5–45)
HGB BLD-MCNC: 8.5 G/DL — LOW (ref 11.5–15.5)
IMM GRANULOCYTES NFR BLD AUTO: 0.3 % — SIGNIFICANT CHANGE UP (ref 0–1.5)
INR BLD: 1.48 RATIO — HIGH (ref 0.88–1.16)
KETONES UR-MCNC: NEGATIVE — SIGNIFICANT CHANGE UP
LEUKOCYTE ESTERASE UR-ACNC: ABNORMAL
LIDOCAIN IGE QN: 68 U/L — LOW (ref 73–393)
LYMPHOCYTES # BLD AUTO: 1.18 K/UL — SIGNIFICANT CHANGE UP (ref 1–3.3)
LYMPHOCYTES # BLD AUTO: 18 % — SIGNIFICANT CHANGE UP (ref 13–44)
MCHC RBC-ENTMCNC: 27 PG — SIGNIFICANT CHANGE UP (ref 27–34)
MCHC RBC-ENTMCNC: 30.8 GM/DL — LOW (ref 32–36)
MCV RBC AUTO: 87.6 FL — SIGNIFICANT CHANGE UP (ref 80–100)
MONOCYTES # BLD AUTO: 0.7 K/UL — SIGNIFICANT CHANGE UP (ref 0–0.9)
MONOCYTES NFR BLD AUTO: 10.7 % — SIGNIFICANT CHANGE UP (ref 2–14)
NEUTROPHILS # BLD AUTO: 4.37 K/UL — SIGNIFICANT CHANGE UP (ref 1.8–7.4)
NEUTROPHILS NFR BLD AUTO: 66.4 % — SIGNIFICANT CHANGE UP (ref 43–77)
NITRITE UR-MCNC: POSITIVE
NRBC # BLD: 0 /100 WBCS — SIGNIFICANT CHANGE UP (ref 0–0)
NT-PROBNP SERPL-SCNC: 2357 PG/ML — HIGH (ref 0–450)
PH UR: 7 — SIGNIFICANT CHANGE UP (ref 5–8)
PLATELET # BLD AUTO: 199 K/UL — SIGNIFICANT CHANGE UP (ref 150–400)
POTASSIUM SERPL-MCNC: 4.3 MMOL/L — SIGNIFICANT CHANGE UP (ref 3.5–5.3)
POTASSIUM SERPL-SCNC: 4.3 MMOL/L — SIGNIFICANT CHANGE UP (ref 3.5–5.3)
PROT SERPL-MCNC: 7 G/DL — SIGNIFICANT CHANGE UP (ref 6–8.3)
PROT UR-MCNC: 75 MG/DL
PROTHROM AB SERPL-ACNC: 17.1 SEC — HIGH (ref 10–12.9)
RBC # BLD: 3.15 M/UL — LOW (ref 3.8–5.2)
RBC # FLD: 16.8 % — HIGH (ref 10.3–14.5)
S PYO AG SPEC QL IA: NEGATIVE — SIGNIFICANT CHANGE UP
SODIUM SERPL-SCNC: 143 MMOL/L — SIGNIFICANT CHANGE UP (ref 135–145)
SP GR SPEC: 1 — LOW (ref 1.01–1.02)
UROBILINOGEN FLD QL: NEGATIVE — SIGNIFICANT CHANGE UP
WBC # BLD: 6.57 K/UL — SIGNIFICANT CHANGE UP (ref 3.8–10.5)
WBC # FLD AUTO: 6.57 K/UL — SIGNIFICANT CHANGE UP (ref 3.8–10.5)

## 2019-03-14 PROCEDURE — 99281 EMR DPT VST MAYX REQ PHY/QHP: CPT

## 2019-03-14 PROCEDURE — 99223 1ST HOSP IP/OBS HIGH 75: CPT | Mod: AI

## 2019-03-14 PROCEDURE — 93971 EXTREMITY STUDY: CPT | Mod: 26,LT

## 2019-03-14 PROCEDURE — 70450 CT HEAD/BRAIN W/O DYE: CPT | Mod: 26

## 2019-03-14 PROCEDURE — 93010 ELECTROCARDIOGRAM REPORT: CPT

## 2019-03-14 PROCEDURE — 71045 X-RAY EXAM CHEST 1 VIEW: CPT | Mod: 26

## 2019-03-14 RX ORDER — CEFTRIAXONE 500 MG/1
1 INJECTION, POWDER, FOR SOLUTION INTRAMUSCULAR; INTRAVENOUS ONCE
Qty: 0 | Refills: 0 | Status: COMPLETED | OUTPATIENT
Start: 2019-03-14 | End: 2019-03-14

## 2019-03-14 RX ORDER — DILTIAZEM HCL 120 MG
120 CAPSULE, EXT RELEASE 24 HR ORAL DAILY
Qty: 0 | Refills: 0 | Status: DISCONTINUED | OUTPATIENT
Start: 2019-03-14 | End: 2019-03-17

## 2019-03-14 RX ORDER — HYDRALAZINE HCL 50 MG
50 TABLET ORAL THREE TIMES A DAY
Qty: 0 | Refills: 0 | Status: DISCONTINUED | OUTPATIENT
Start: 2019-03-14 | End: 2019-03-17

## 2019-03-14 RX ORDER — METOPROLOL TARTRATE 50 MG
100 TABLET ORAL DAILY
Qty: 0 | Refills: 0 | Status: DISCONTINUED | OUTPATIENT
Start: 2019-03-14 | End: 2019-03-17

## 2019-03-14 RX ORDER — WARFARIN SODIUM 2.5 MG/1
2.5 TABLET ORAL ONCE
Qty: 0 | Refills: 0 | Status: COMPLETED | OUTPATIENT
Start: 2019-03-14 | End: 2019-03-14

## 2019-03-14 RX ORDER — IBUPROFEN 200 MG
400 TABLET ORAL ONCE
Qty: 0 | Refills: 0 | Status: COMPLETED | OUTPATIENT
Start: 2019-03-14 | End: 2019-03-14

## 2019-03-14 RX ORDER — ENOXAPARIN SODIUM 100 MG/ML
30 INJECTION SUBCUTANEOUS EVERY 24 HOURS
Qty: 0 | Refills: 0 | Status: DISCONTINUED | OUTPATIENT
Start: 2019-03-14 | End: 2019-03-16

## 2019-03-14 RX ORDER — ENOXAPARIN SODIUM 100 MG/ML
40 INJECTION SUBCUTANEOUS EVERY 24 HOURS
Qty: 0 | Refills: 0 | Status: DISCONTINUED | OUTPATIENT
Start: 2019-03-14 | End: 2019-03-14

## 2019-03-14 RX ORDER — ASPIRIN/CALCIUM CARB/MAGNESIUM 324 MG
81 TABLET ORAL DAILY
Qty: 0 | Refills: 0 | Status: DISCONTINUED | OUTPATIENT
Start: 2019-03-14 | End: 2019-03-17

## 2019-03-14 RX ORDER — CEFTRIAXONE 500 MG/1
1 INJECTION, POWDER, FOR SOLUTION INTRAMUSCULAR; INTRAVENOUS EVERY 24 HOURS
Qty: 0 | Refills: 0 | Status: DISCONTINUED | OUTPATIENT
Start: 2019-03-15 | End: 2019-03-17

## 2019-03-14 RX ORDER — SODIUM CHLORIDE 9 MG/ML
3 INJECTION INTRAMUSCULAR; INTRAVENOUS; SUBCUTANEOUS ONCE
Qty: 0 | Refills: 0 | Status: COMPLETED | OUTPATIENT
Start: 2019-03-14 | End: 2019-03-14

## 2019-03-14 RX ORDER — GABAPENTIN 400 MG/1
1 CAPSULE ORAL
Qty: 0 | Refills: 0 | COMMUNITY

## 2019-03-14 RX ORDER — SODIUM CHLORIDE 9 MG/ML
1000 INJECTION INTRAMUSCULAR; INTRAVENOUS; SUBCUTANEOUS ONCE
Qty: 0 | Refills: 0 | Status: COMPLETED | OUTPATIENT
Start: 2019-03-14 | End: 2019-03-14

## 2019-03-14 RX ORDER — ATORVASTATIN CALCIUM 80 MG/1
40 TABLET, FILM COATED ORAL AT BEDTIME
Qty: 0 | Refills: 0 | Status: DISCONTINUED | OUTPATIENT
Start: 2019-03-14 | End: 2019-03-17

## 2019-03-14 RX ADMIN — SODIUM CHLORIDE 3 MILLILITER(S): 9 INJECTION INTRAMUSCULAR; INTRAVENOUS; SUBCUTANEOUS at 18:10

## 2019-03-14 RX ADMIN — SODIUM CHLORIDE 1000 MILLILITER(S): 9 INJECTION INTRAMUSCULAR; INTRAVENOUS; SUBCUTANEOUS at 18:10

## 2019-03-14 RX ADMIN — Medication 400 MILLIGRAM(S): at 23:40

## 2019-03-14 RX ADMIN — SODIUM CHLORIDE 1000 MILLILITER(S): 9 INJECTION INTRAMUSCULAR; INTRAVENOUS; SUBCUTANEOUS at 19:50

## 2019-03-14 RX ADMIN — WARFARIN SODIUM 2.5 MILLIGRAM(S): 2.5 TABLET ORAL at 22:42

## 2019-03-14 RX ADMIN — CEFTRIAXONE 100 GRAM(S): 500 INJECTION, POWDER, FOR SOLUTION INTRAMUSCULAR; INTRAVENOUS at 20:46

## 2019-03-14 NOTE — H&P ADULT - NSHPPHYSICALEXAM_GEN_ALL_CORE
Physical Exam  General: No apparent distress, dehydrated   Head: normocephalic, atraumatic  Eyes: EOMI, anicteric, R eye reactive to light, L eye blown pupil not reactive to light   ENT: dry mucous membranes, no pharyngeal exudates  Heart: irregularly irregular, S1, S2, no murmurs  Chest: CTA b/l, no rales, rhonchi, or wheezes  Abd: hypoactive BS+, soft, slight tenderness to palpation b/l lower quadrants, ND  Back: no CVA tenderness  Extr: no edema or cyanosis, <2 secs cap refill  Neuro: AA&Ox3, no focal weakness, sensation to light touch intact  Psych: normal affect Physical Exam  General: No apparent distress  Head: normocephalic, atraumatic  Eyes: EOMI, anicteric, R eye reactive to light, L eye blown pupil not reactive to light   ENT: dry mucous membranes, no pharyngeal exudates  Heart: irregularly irregular, S1, S2, no murmurs  Chest: CTA b/l, no rales, rhonchi, or wheezes  Abd: hypoactive BS+, soft, slight tenderness to palpation b/l lower quadrants, ND  Back: no CVA tenderness  Extr: no edema or cyanosis, <2 secs cap refill.  Hands have arthritic changes.   Neuro: AA&Ox3, no focal weakness, sensation to light touch intact  Psych: normal affect

## 2019-03-14 NOTE — ED PROVIDER NOTE - CARE PLAN
Principal Discharge DX:	UTI (urinary tract infection)  Assessment and plan of treatment:	admit  Secondary Diagnosis:	Altered mental state

## 2019-03-14 NOTE — ED PROVIDER NOTE - OBJECTIVE STATEMENT
Pt is a 84 yo female who presents to the ED with a cc of AMS.  PMHx of Afib, h/o CVA, h/o cerebral aneurysm repair, HLD, HTN, MVP, neuropathy, osteomyelitis, OA, PVD, RA.  Pt has been suffering from increased confusion over the last several days.  She was previously diagnosed with early onset dementia approx 3 years ago but usually is able to function independently with aide assistance 8 hours a day. Pt has regular check up with PMD yesterday with no acute findings.  Today around 7 45 am but called er daughter upset that she could not find paperwork which is not usual for her.  Daughter reports that when the aide came she noted smoke in the house and realized that her mother had left an oven ayad in the stove and left the stove on.  She has also been noted to have visual hallucinations seeing bugs.  Denies fever, chills, HA, N/V, CP, SOB, cough, abd pain.  She did report a recent sore throat which has resolved.   Pt is tearful at bedside and realizes something is not right

## 2019-03-14 NOTE — H&P ADULT - HISTORY OF PRESENT ILLNESS
82 yo female w/ PMH of afib on coumadin, CVA and hx of aneurysm repair, HTN, HLD, mitral valve prolapse, PVD s/p stents x 3 (RLE), rheumatoid arthritis, cataracts of the L eye presents  Denies chest pain, SOB, heartburn. Denies any recent sick contacts, recent abx usage, recent hospitalization. Patient did not take any medications yesterday due to nausea.      sent br dr mtz  seen there yesterday "& he thought she was fine"  as per daughter pt "not making sense, she left the stove on, behaviors were not normal & hallucinating since this am" 82 yo female w/ PMH of afib on coumadin, CVA and hx of aneurysm repair, HTN, HLD, mitral valve prolapse, PVD s/p stents x 3 (RLE), rheumatoid arthritis, cataracts of the L eye presents with altered mental status from home.  Spoke to daughter Dixie over the phone.  Per daughter pt was agitated over the phone when she was speaking to her in the early morning.  When her aide came in later in the morning, the aide texted the daughter stating that her mother was acting unusual and very confused.  She couldn't find the sentences to finish her through processes. Pt was also hallucinating.   Daughter took the patient to her PMDs office, but PA advised her to go to hospital to be evaluated.      At the ED, CT head Surgical changes related to clipping left paraclinoid aneurysm.No CT evidence of an acute territorial infarction.  Atrophy and chronic small vessel ischemic changes.  .  U/A showed +nitrite.    Pt was given 1 dose of rocephin.     When i spoke with patient, she was alert and oriented and wasn't hallucinating anymore. 84 yo female w/ PMH of afib on coumadin, CVA and hx of aneurysm repair, HTN, HLD, mitral valve prolapse, PVD s/p stents x 3 (RLE), rheumatoid arthritis, cataracts of the L eye presents with altered mental status from home.  Spoke to daughter Dixie over the phone.  Per daughter pt was agitated over the phone when she was speaking to her in the early morning.  When her aide came in later in the morning, the aide texted the daughter stating that her mother was acting unusual and very confused.  She couldn't find the sentences to finish her through processes. Pt was also hallucinating.   Daughter took the patient to her PMDs office, but PA advised her to go to hospital to be evaluated.      At the ED, CT head Surgical changes related to clipping left paraclinoid aneurysm.No CT evidence of an acute territorial infarction.  Atrophy and chronic small vessel ischemic changes.  .  U/A showed +nitrite.    Pt was given 1 dose of rocephin.     When i spoke with patient, she was alert and oriented and wasn't hallucinating anymore.     Venous doppler was done to rule out DVT, it was negative.

## 2019-03-14 NOTE — H&P ADULT - ASSESSMENT
82 yo female w/ PMH of afib on coumadin, CVA and hx of aneurysm repair, HTN, HLD, mitral valve prolapse, PVD s/p stents x 3 (RLE), rheumatoid arthritis, cataracts of the L eye being admitted for UTI/AMS  1. UTI - GMF, Continue IV abx.  F/U cultures  2. AMS - likely related to UTI.  Monitor for status changes, same plan as above  3. HTN - continue BP meds with hold parameters  4. HLD - continue statin  5. Atrial Fib - continue AC and rate control meds  6. DVT proph - on AC already. 84 yo female w/ PMH of afib on coumadin, CVA and hx of aneurysm repair, HTN, HLD, mitral valve prolapse, PVD s/p stents x 3 (RLE), rheumatoid arthritis, cataracts of the L eye being admitted for UTI/AMS  1. UTI - GMF, Continue IV abx.  F/U cultures  2. AMS - likely related to UTI.  Monitor for status changes, same plan as above  3. HTN - continue BP meds with hold parameters  4. HLD - continue statin  5. Atrial Fib - continue AC and rate control meds, give dose tonight of coumadin.   6. DVT proph - on AC already.

## 2019-03-14 NOTE — ED ADULT NURSE NOTE - NSIMPLEMENTINTERV_GEN_ALL_ED
Implemented All Universal Safety Interventions:  [place] to call system. Call bell, personal items and telephone within reach. Instruct patient to call for assistance. Room bathroom lighting operational. Non-slip footwear when patient is off stretcher. Physically safe environment: no spills, clutter or unnecessary equipment. Stretcher in lowest position, wheels locked, appropriate side rails in place.

## 2019-03-14 NOTE — PROVIDER CONTACT NOTE (OTHER) - ACTION/TREATMENT ORDERED:
Dr. Márquez made aware.  Said she will look into the pt chart to prescribe pain medication.  Awaiting further interventions, will continue to monitor.

## 2019-03-14 NOTE — ED PROVIDER NOTE - CLINICAL SUMMARY MEDICAL DECISION MAKING FREE TEXT BOX
Pt is a 82 yo female who presents to the ED with AMS.  Concern for infection vs neurological issue.  Will obtain screening septic work up, EKG, chest x-ray, CT head.  Will observe pt

## 2019-03-14 NOTE — H&P ADULT - NSICDXPASTSURGICALHX_GEN_ALL_CORE_FT
PAST SURGICAL HISTORY:  Elective surgery (Exision of cyst on liver, 1985)    Elective surgery ("Twisted bowel", 2014)    H/O cerebral aneurysm repair Brain clips (1978(    PVD (peripheral vascular disease) s/p RLE bypass x 3, most recent 3/2012 Right external iliac to PT bypass w/ PTFE (2012)    Renal stone Cysto stent placement 10/1/2014    S/P cataract surgery (Left eye)    S/P ORIF (open reduction internal fixation) fracture Left hip fx (2012) & R hip fx (2013)    S/P FRED (total abdominal hysterectomy) (1987, Hx of "ovarian cancer?")

## 2019-03-14 NOTE — ED ADULT NURSE NOTE - CHIEF COMPLAINT QUOTE
sent br dr mtz  seen there yesterday "& he thought she was fine"  as per daughter pt "not making sense, she left the stove on, behaviors were not normal & hallucinating since this am"

## 2019-03-14 NOTE — H&P ADULT - NSHPREVIEWOFSYSTEMS_GEN_ALL_CORE
REVIEW OF SYSTEMS:    CONSTITUTIONAL: No fever, weight loss, or fatigue  EYES: No eye pain, visual disturbances, or discharge  ENMT:  No difficulty hearing, tinnitus, vertigo; No sinus or throat pain  NECK: No pain or stiffness  RESPIRATORY: No cough, wheezing, chills or hemoptysis; No shortness of breath  CARDIOVASCULAR: No chest pain, palpitations, dizziness, or leg swelling  GASTROINTESTINAL: No abdominal or epigastric pain. No nausea, vomiting, or hematemesis; No diarrhea or constipation. No melena or hematochezia.  GENITOURINARY: No dysuria, frequency, hematuria, or incontinence  NEUROLOGICAL: No headaches, memory loss, loss of strength, numbness, or tremors  SKIN: No itching, burning, rashes, or lesions   LYMPH NODES: No enlarged glands  ENDOCRINE: No heat or cold intolerance; No hair loss  MUSCULOSKELETAL: No joint pain or swelling; No muscle, back, or extremity pain  PSYCHIATRIC: No depression, anxiety, mood swings, or difficulty sleeping REVIEW OF SYSTEMS:    CONSTITUTIONAL: No fever, weight loss, or fatigue  EYES: No eye pain, visual disturbances, or discharge  ENMT:  No difficulty hearing, tinnitus, vertigo; No sinus or throat pain  NECK: No pain or stiffness  RESPIRATORY: No cough, wheezing, chills or hemoptysis; No shortness of breath  CARDIOVASCULAR: No chest pain, palpitations, dizziness, or leg swelling  GASTROINTESTINAL: No abdominal or epigastric pain. No nausea, vomiting, or hematemesis; No diarrhea or constipation. No melena or hematochezia.  GENITOURINARY: No dysuria, frequency, hematuria, or incontinence  NEUROLOGICAL: +hallucinations, No headaches, memory loss, loss of strength, numbness, or tremors  SKIN: No itching, burning, rashes, or lesions   LYMPH NODES: No enlarged glands  ENDOCRINE: No heat or cold intolerance; No hair loss  MUSCULOSKELETAL: No joint pain or swelling; No muscle, back, or extremity pain  PSYCHIATRIC: +hallucination,  No depression, anxiety, mood swings, or difficulty sleeping

## 2019-03-14 NOTE — H&P ADULT - NSICDXPASTMEDICALHX_GEN_ALL_CORE_FT
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

## 2019-03-14 NOTE — ED PROVIDER NOTE - EYES, MLM
Clear bilaterally, pupils unequal, round and reactive to light. Left pupil greater then right with left eyelid drooping from prior CVA

## 2019-03-15 ENCOUNTER — TRANSCRIPTION ENCOUNTER (OUTPATIENT)
Age: 84
End: 2019-03-15

## 2019-03-15 LAB
ANION GAP SERPL CALC-SCNC: 7 MMOL/L — SIGNIFICANT CHANGE UP (ref 5–17)
BASOPHILS # BLD AUTO: 0.04 K/UL — SIGNIFICANT CHANGE UP (ref 0–0.2)
BASOPHILS NFR BLD AUTO: 0.5 % — SIGNIFICANT CHANGE UP (ref 0–2)
BUN SERPL-MCNC: 25 MG/DL — HIGH (ref 7–23)
CALCIUM SERPL-MCNC: 7.9 MG/DL — LOW (ref 8.5–10.1)
CHLORIDE SERPL-SCNC: 112 MMOL/L — HIGH (ref 96–108)
CO2 SERPL-SCNC: 27 MMOL/L — SIGNIFICANT CHANGE UP (ref 22–31)
CREAT SERPL-MCNC: 1.1 MG/DL — SIGNIFICANT CHANGE UP (ref 0.5–1.3)
EOSINOPHIL # BLD AUTO: 0.27 K/UL — SIGNIFICANT CHANGE UP (ref 0–0.5)
EOSINOPHIL NFR BLD AUTO: 3.2 % — SIGNIFICANT CHANGE UP (ref 0–6)
GLUCOSE SERPL-MCNC: 83 MG/DL — SIGNIFICANT CHANGE UP (ref 70–99)
HCT VFR BLD CALC: 26.8 % — LOW (ref 34.5–45)
HGB BLD-MCNC: 8.3 G/DL — LOW (ref 11.5–15.5)
IMM GRANULOCYTES NFR BLD AUTO: 0.2 % — SIGNIFICANT CHANGE UP (ref 0–1.5)
INR BLD: 1.94 RATIO — HIGH (ref 0.88–1.16)
LYMPHOCYTES # BLD AUTO: 0.96 K/UL — LOW (ref 1–3.3)
LYMPHOCYTES # BLD AUTO: 11.4 % — LOW (ref 13–44)
MCHC RBC-ENTMCNC: 27.1 PG — SIGNIFICANT CHANGE UP (ref 27–34)
MCHC RBC-ENTMCNC: 31 GM/DL — LOW (ref 32–36)
MCV RBC AUTO: 87.6 FL — SIGNIFICANT CHANGE UP (ref 80–100)
MONOCYTES # BLD AUTO: 0.82 K/UL — SIGNIFICANT CHANGE UP (ref 0–0.9)
MONOCYTES NFR BLD AUTO: 9.8 % — SIGNIFICANT CHANGE UP (ref 2–14)
NEUTROPHILS # BLD AUTO: 6.29 K/UL — SIGNIFICANT CHANGE UP (ref 1.8–7.4)
NEUTROPHILS NFR BLD AUTO: 74.9 % — SIGNIFICANT CHANGE UP (ref 43–77)
NRBC # BLD: 0 /100 WBCS — SIGNIFICANT CHANGE UP (ref 0–0)
PLATELET # BLD AUTO: 186 K/UL — SIGNIFICANT CHANGE UP (ref 150–400)
POTASSIUM SERPL-MCNC: 3.9 MMOL/L — SIGNIFICANT CHANGE UP (ref 3.5–5.3)
POTASSIUM SERPL-SCNC: 3.9 MMOL/L — SIGNIFICANT CHANGE UP (ref 3.5–5.3)
PROTHROM AB SERPL-ACNC: 22.4 SEC — HIGH (ref 10–12.9)
RBC # BLD: 3.06 M/UL — LOW (ref 3.8–5.2)
RBC # FLD: 16.7 % — HIGH (ref 10.3–14.5)
SODIUM SERPL-SCNC: 146 MMOL/L — HIGH (ref 135–145)
WBC # BLD: 8.4 K/UL — SIGNIFICANT CHANGE UP (ref 3.8–10.5)
WBC # FLD AUTO: 8.4 K/UL — SIGNIFICANT CHANGE UP (ref 3.8–10.5)

## 2019-03-15 PROCEDURE — 99233 SBSQ HOSP IP/OBS HIGH 50: CPT | Mod: GC

## 2019-03-15 PROCEDURE — 93925 LOWER EXTREMITY STUDY: CPT | Mod: 26

## 2019-03-15 RX ORDER — POLYETHYLENE GLYCOL 3350 17 G/17G
17 POWDER, FOR SOLUTION ORAL DAILY
Qty: 0 | Refills: 0 | Status: DISCONTINUED | OUTPATIENT
Start: 2019-03-15 | End: 2019-03-17

## 2019-03-15 RX ORDER — WARFARIN SODIUM 2.5 MG/1
2.5 TABLET ORAL ONCE
Qty: 0 | Refills: 0 | Status: COMPLETED | OUTPATIENT
Start: 2019-03-15 | End: 2019-03-15

## 2019-03-15 RX ORDER — SODIUM CHLORIDE 9 MG/ML
1000 INJECTION INTRAMUSCULAR; INTRAVENOUS; SUBCUTANEOUS
Qty: 0 | Refills: 0 | Status: DISCONTINUED | OUTPATIENT
Start: 2019-03-15 | End: 2019-03-17

## 2019-03-15 RX ORDER — SENNA PLUS 8.6 MG/1
2 TABLET ORAL AT BEDTIME
Qty: 0 | Refills: 0 | Status: DISCONTINUED | OUTPATIENT
Start: 2019-03-15 | End: 2019-03-17

## 2019-03-15 RX ORDER — DOCUSATE SODIUM 100 MG
100 CAPSULE ORAL
Qty: 0 | Refills: 0 | Status: DISCONTINUED | OUTPATIENT
Start: 2019-03-15 | End: 2019-03-17

## 2019-03-15 RX ADMIN — Medication 50 MILLIGRAM(S): at 05:19

## 2019-03-15 RX ADMIN — ATORVASTATIN CALCIUM 40 MILLIGRAM(S): 80 TABLET, FILM COATED ORAL at 22:01

## 2019-03-15 RX ADMIN — POLYETHYLENE GLYCOL 3350 17 GRAM(S): 17 POWDER, FOR SOLUTION ORAL at 20:17

## 2019-03-15 RX ADMIN — CEFTRIAXONE 100 GRAM(S): 500 INJECTION, POWDER, FOR SOLUTION INTRAMUSCULAR; INTRAVENOUS at 19:27

## 2019-03-15 RX ADMIN — SODIUM CHLORIDE 50 MILLILITER(S): 9 INJECTION INTRAMUSCULAR; INTRAVENOUS; SUBCUTANEOUS at 15:53

## 2019-03-15 RX ADMIN — Medication 81 MILLIGRAM(S): at 13:47

## 2019-03-15 RX ADMIN — Medication 400 MILLIGRAM(S): at 00:40

## 2019-03-15 RX ADMIN — Medication 100 MILLIGRAM(S): at 20:17

## 2019-03-15 RX ADMIN — Medication 120 MILLIGRAM(S): at 05:19

## 2019-03-15 RX ADMIN — SENNA PLUS 2 TABLET(S): 8.6 TABLET ORAL at 22:01

## 2019-03-15 RX ADMIN — Medication 50 MILLIGRAM(S): at 13:47

## 2019-03-15 RX ADMIN — WARFARIN SODIUM 2.5 MILLIGRAM(S): 2.5 TABLET ORAL at 22:01

## 2019-03-15 RX ADMIN — ENOXAPARIN SODIUM 30 MILLIGRAM(S): 100 INJECTION SUBCUTANEOUS at 05:19

## 2019-03-15 RX ADMIN — Medication 50 MILLIGRAM(S): at 22:01

## 2019-03-15 RX ADMIN — Medication 100 MILLIGRAM(S): at 05:19

## 2019-03-15 NOTE — PROVIDER CONTACT NOTE (OTHER) - ACTION/TREATMENT ORDERED:
made aware.  Said she will put in orders.  Awaiting further interventions.  Will continue to monitor.

## 2019-03-15 NOTE — PROGRESS NOTE ADULT - ASSESSMENT
84 yo female w/ PMH of afib on coumadin, CVA and hx of aneurysm repair, HTN, HLD, mitral valve prolapse, PVD s/p stents x 3 (RLE), rheumatoid arthritis, cataracts of the L eye admitted found to have UA turbid appearing with nitrites TNTC bacteria, moderate leuk esterase and WBC c/w UTI      UTI   continue rocephin  f/u urine cx      AMS   likely 2/2 UTI   patient AOx3 on exam       Bilateral foot pain  pt with vascular disease s/p fem-pop bypasses, has seen Dr. Todd Llanes and Dr. Sánchez in the past (2018)  arterial dopplers, will evaluate for repeat vascular surgery consult pending results (pt with reporte previous vascular surgeries though cannot remember surgeon)    HTN   hydralazine 50 mg TID     HLD   atorvastatin 40 mg       Atrial Fib   c/w dilt 120 mg daily  c/w toprol 100 mg daily  coumadin 2.5 mg for 5 days and 5 mg for 2 days per med rec, will dose daily based on INR    DVT proph  on coumadin for afib 84 yo female w/ PMH of afib on coumadin, CVA and hx of aneurysm repair, HTN, HLD, mitral valve prolapse, PVD s/p stents x 3 (RLE), rheumatoid arthritis, cataracts of the L eye admitted found to have UA turbid appearing with nitrites TNTC bacteria, moderate leuk esterase and WBC c/w UTI      UTI   continue rocephin  f/u urine cx      AMS, likely 2/2 metabolic encephalopathy from UTI.  Resolved.  patient AOx3 on exam       Bilateral foot pain  pt with vascular disease s/p fem-pop bypasses, has seen Dr. Todd Llanes and Dr. Sánchez in the past (2018)  arterial dopplers, will evaluate for repeat vascular surgery consult pending results (pt with reported previous vascular surgeries though cannot remember surgeon)    HTN   hydralazine 50 mg TID     HLD   atorvastatin 40 mg       Atrial Fib   c/w dilt 120 mg daily  c/w toprol 100 mg daily  coumadin 2.5 mg for 5 days and 5 mg for 2 days per med rec, will dose daily based on INR    DVT proph  on coumadin for afib

## 2019-03-15 NOTE — PHYSICAL THERAPY INITIAL EVALUATION ADULT - ADDITIONAL COMMENTS
Pt lives alone in [address], no steps inside.  Pt has aide 7 hrs/day; 7 days a week.  Pt was independent in all ADLs and ambulated independently with RW.  Pt has tub shower with seat and grab bars.

## 2019-03-15 NOTE — DISCHARGE NOTE PROVIDER - CARE PROVIDER_API CALL
Tahir Lockhart (DO)  Family Medicine  [address], [address]  [place] [address]  Phone: [phone]  Fax: [phone]  Follow Up Time:     Clif Sánchez)  Surgery  [address], [address]  [place], [place] [id number]  Phone: [phone]  Fax: [phone]  Follow Up Time:

## 2019-03-15 NOTE — PROVIDER CONTACT NOTE (OTHER) - SITUATION
Pt has not had a bowel movement in 3 days.  Said that she tried to go today but has not been able to do so.

## 2019-03-15 NOTE — PROGRESS NOTE ADULT - SUBJECTIVE AND OBJECTIVE BOX
HPI:  84 yo female w/ PMH of afib on coumadin, CVA and hx of aneurysm repair, HTN, HLD, mitral valve prolapse, PVD s/p stents x 3 (RLE), rheumatoid arthritis, cataracts of the L eye presents with altered mental status from home.  Spoke to daughter Dixie over the phone.  Per daughter pt was agitated over the phone when she was speaking to her in the early morning.  When her aide came in later in the morning, the aide texted the daughter stating that her mother was acting unusual and very confused.  She couldn't find the sentences to finish her through processes. Pt was also hallucinating.   Daughter took the patient to her PMDs office, but PA advised her to go to hospital to be evaluated.      At the ED, CT head Surgical changes related to clipping left paraclinoid aneurysm.No CT evidence of an acute territorial infarction.  Atrophy and chronic small vessel ischemic changes.  .  U/A showed +nitrite.    Pt was given 1 dose of rocephin.     When i spoke with patient, she was alert and oriented and wasn't hallucinating anymore.     Venous doppler was done to rule out DVT, it was negative. (14 Mar 2019 20:56)        Overnight Events: patient complains of chronic nightly urinary incontinence, denies frequency, urgency, hematuria. Denies systemic signs of infection at this time (fever, diaphoresis). Otherwise, patient notes bl LE pain, burning, affects bl feet without radiation. Has hx of vascular dx, evaluated by vascular on prior admissions at [facility] with previous vascular surgeries. Now with new onset (patient notes started this AM), bl foot pain at rest. Arterial dopplers ordered     REVIEW OF SYSTEMS:    CONSTITUTIONAL: No weakness, fevers   EYES/ENT: No visual changes, no throat pain   RESPIRATORY: No cough,  No shortness of breath  CARDIOVASCULAR: No chest pain or palpitations  GASTROINTESTINAL: No abdominal, nausea, vomiting  GENITOURINARY: No dysuria, frequency or hematuria  NEUROLOGICAL: bl LE pain  All other review of systems is negative unless indicated above.    VITAL SIGNS:  Vital Signs Last 24 Hrs  T(C): 36.7 (15 Mar 2019 05:01), Max: 36.7 (14 Mar 2019 17:22)  T(F): 98 (15 Mar 2019 05:01), Max: 98 (14 Mar 2019 17:22)  HR: 63 (15 Mar 2019 05:01) (61 - 77)  BP: 155/67 (15 Mar 2019 05:01) (155/67 - 172/76)  BP(mean): --  RR: 16 (15 Mar 2019 05:01) (15 - 16)  SpO2: 94% (15 Mar 2019 05:01) (94% - 95%)      PHYSICAL EXAM:     GENERAL: lying in bed comfortably, NAD   HEENT: NC/AT, EOMI, neck supple, somewhat dry appearing mucous membranes   RESPIRATORY: CTAB, no rhonchi, rales, or wheezing  CARDIOVASCULAR: RRR, no murmurs, gallops, rubs  ABDOMINAL: soft, non-tender, non-distended, positive bowel sounds   EXTREMITIES: no clubbing, cyanosis, or edema  NEUROLOGICAL: alert and oriented x 3, non-focal, bl foot pain exacerbated with touch  MUSCULOSKELETAL: no gross joint deformity                          8.3    8.40  )-----------( 186      ( 15 Mar 2019 07:15 )             26.8     03-15    146<H>  |  112<H>  |  25<H>  ----------------------------<  83  3.9   |  27  |  1.10    Ca    7.9<L>      15 Mar 2019 07:15    TPro  7.0  /  Alb  3.3  /  TBili  0.4  /  DBili  x   /  AST  22  /  ALT  22  /  AlkPhos  69  03-14      CAPILLARY BLOOD GLUCOSE          MEDICATIONS  (STANDING):  aspirin enteric coated 81 milliGRAM(s) Oral daily  atorvastatin 40 milliGRAM(s) Oral at bedtime  cefTRIAXone   IVPB 1 Gram(s) IV Intermittent every 24 hours  diltiazem    milliGRAM(s) Oral daily  enoxaparin Injectable 30 milliGRAM(s) SubCutaneous every 24 hours  hydrALAZINE 50 milliGRAM(s) Oral three times a day  metoprolol succinate  milliGRAM(s) Oral daily HPI:  82 yo female w/ PMH of afib on coumadin, CVA and hx of aneurysm repair, HTN, HLD, mitral valve prolapse, PVD s/p stents x 3 (RLE), rheumatoid arthritis, cataracts of the L eye presents with altered mental status from home.  Spoke to daughter Dixie over the phone.  Per daughter pt was agitated over the phone when she was speaking to her in the early morning.  When her aide came in later in the morning, the aide texted the daughter stating that her mother was acting unusual and very confused.  She couldn't find the sentences to finish her through processes. Pt was also hallucinating.   Daughter took the patient to her PMDs office, but PA advised her to go to hospital to be evaluated.      At the ED, CT head Surgical changes related to clipping left paraclinoid aneurysm.No CT evidence of an acute territorial infarction.  Atrophy and chronic small vessel ischemic changes.  .  U/A showed +nitrite.    Pt was given 1 dose of rocephin.     When i spoke with patient, she was alert and oriented and wasn't hallucinating anymore.     Venous doppler was done to rule out DVT, it was negative. (14 Mar 2019 20:56)        Overnight Events: patient complains of chronic nightly urinary incontinence, denies frequency, urgency, hematuria. Denies systemic signs of infection at this time (fever, diaphoresis). Otherwise, patient notes bl LE pain, burning, affects bl feet without radiation. Has hx of vascular dx, evaluated by vascular on prior admissions at [facility] with previous vascular surgeries. Now with new onset (patient notes started this AM), bl foot pain at rest. Arterial dopplers ordered (pending)    REVIEW OF SYSTEMS:    CONSTITUTIONAL: No weakness, fevers   EYES/ENT: No visual changes, no throat pain   RESPIRATORY: No cough,  No shortness of breath  CARDIOVASCULAR: No chest pain or palpitations  GASTROINTESTINAL: No abdominal, nausea, vomiting  GENITOURINARY: No dysuria, frequency or hematuria  NEUROLOGICAL: bl LE pain  All other review of systems is negative unless indicated above.    VITAL SIGNS:  Vital Signs Last 24 Hrs  T(C): 36.7 (15 Mar 2019 05:01), Max: 36.7 (14 Mar 2019 17:22)  T(F): 98 (15 Mar 2019 05:01), Max: 98 (14 Mar 2019 17:22)  HR: 63 (15 Mar 2019 05:01) (61 - 77)  BP: 155/67 (15 Mar 2019 05:01) (155/67 - 172/76)  BP(mean): --  RR: 16 (15 Mar 2019 05:01) (15 - 16)  SpO2: 94% (15 Mar 2019 05:01) (94% - 95%)      PHYSICAL EXAM:     GENERAL: lying in bed comfortably, NAD   HEENT: NC/AT, EOMI, neck supple, somewhat dry appearing mucous membranes   RESPIRATORY: grossly clear bl  CARDIOVASCULAR: RRR  ABDOMINAL: soft, non-tender, non-distended, positive bowel sounds  : no suprapubic tenderness  EXTREMITIES: dry appearing feet bl, no obvious mottling or other evidence of arterial vascular disease, palpable distal pulses  NEUROLOGICAL: alert and oriented x 3, non-focal, bl foot pain exacerbated with touch  MUSCULOSKELETAL: no gross joint deformity                          8.3    8.40  )-----------( 186      ( 15 Mar 2019 07:15 )             26.8     03-15    146<H>  |  112<H>  |  25<H>  ----------------------------<  83  3.9   |  27  |  1.10    Ca    7.9<L>      15 Mar 2019 07:15    TPro  7.0  /  Alb  3.3  /  TBili  0.4  /  DBili  x   /  AST  22  /  ALT  22  /  AlkPhos  69  03-14      CAPILLARY BLOOD GLUCOSE          MEDICATIONS  (STANDING):  aspirin enteric coated 81 milliGRAM(s) Oral daily  atorvastatin 40 milliGRAM(s) Oral at bedtime  cefTRIAXone   IVPB 1 Gram(s) IV Intermittent every 24 hours  diltiazem    milliGRAM(s) Oral daily  enoxaparin Injectable 30 milliGRAM(s) SubCutaneous every 24 hours  hydrALAZINE 50 milliGRAM(s) Oral three times a day  metoprolol succinate  milliGRAM(s) Oral daily

## 2019-03-15 NOTE — PHYSICAL THERAPY INITIAL EVALUATION ADULT - PERTINENT HX OF CURRENT PROBLEM, REHAB EVAL
84 yo female w/ PMH of afib on coumadin, CVA and hx of aneurysm repair, HTN, HLD, mitral valve prolapse, PVD s/p stents x 3 (RLE), rheumatoid arthritis, cataracts of the L eye presents with altered mental status from home.  Spoke to daughter Dixie over the phone.  Per daughter pt was agitated over the phone when she was speaking to her in the early morning.

## 2019-03-15 NOTE — DISCHARGE NOTE PROVIDER - NSDCCPCAREPLAN_GEN_ALL_CORE_FT
PRINCIPAL DISCHARGE DIAGNOSIS  Diagnosis: UTI (urinary tract infection)  Assessment and Plan of Treatment: completed ceftriaxone, patient asymptomatic      SECONDARY DISCHARGE DIAGNOSES  Diagnosis: History of peripheral vascular disease  Assessment and Plan of Treatment: continue aspirin and coumadin, follow up with vascular surgery as scheduled.    Diagnosis: Afib  Assessment and Plan of Treatment: continue home warfarin and home medications. follow up with your cardiologist as scheduled.    Diagnosis: Altered mental state  Assessment and Plan of Treatment: improved, continue hydration PRINCIPAL DISCHARGE DIAGNOSIS  Diagnosis: UTI (urinary tract infection)  Assessment and Plan of Treatment: continue macrobid as prescribed      SECONDARY DISCHARGE DIAGNOSES  Diagnosis: History of peripheral vascular disease  Assessment and Plan of Treatment: continue aspirin and coumadin, follow up with vascular surgery as scheduled.    Diagnosis: Afib  Assessment and Plan of Treatment: continue home warfarin and home medications. follow up with your cardiologist as scheduled.    Diagnosis: Altered mental state  Assessment and Plan of Treatment: improved, continue hydration

## 2019-03-15 NOTE — DISCHARGE NOTE PROVIDER - HOSPITAL COURSE
82 yo female w/ PMH of afib on coumadin, CVA and hx of aneurysm repair, HTN, HLD, mitral valve prolapse, PVD s/p stents x 3 (RLE), rheumatoid arthritis, cataracts of the L eye presents with altered mental status from home.        Her daughter noted patient to sound agitated and confused over the phone. On presentation to the ED, patient was oriented x3. UA revealed turbid appearing urine, moderate leuk esterase and positive nitrites, TNTC bacteria, WBC > 50. She complained of chronic urinary incontinence that only occurs at night, though denied increased frequency, dysuria, hematuria.         She was treated with Rocephin. She was also given IVF as patient appeared volume depleted. She was evaluated by PT and found to have no need for skilled PT needs. 84 yo female w/ PMH of afib on coumadin, CVA and hx of aneurysm repair, HTN, HLD, mitral valve prolapse, PVD s/p stents x 3 (RLE), rheumatoid arthritis, cataracts of the L eye presents with altered mental status from home.        Her daughter noted patient to sound agitated and confused over the phone. On presentation to the ED, patient was oriented x3. UA revealed turbid appearing urine, moderate leuk esterase and positive nitrites, TNTC bacteria, WBC > 50. She complained of chronic urinary incontinence that only occurs at night, though denied increased frequency, dysuria, hematuria.         She was treated with Rocephin. She was also given IVF as patient appeared volume depleted. She was evaluated by PT and found to have no need for skilled PT needs. Patient denies any urinary complaints, was alert and oriented, ceftriaxone was stopped. She was seen by vascular surgery for chronic PAD of right >> left legs and chronically occluded RLE bypass grafts. She has  adequate collateral flow in both legs. There is no limb threatening ischemia in either leg. No  vascular interventions are needed.        Patient  is hemodynamically stable, agrees to go home and follow up as scheduled with her PCP.         Vital Signs Last 24 Hrs    T(C): 36.7 (17 Mar 2019 04:50), Max: 36.7 (16 Mar 2019 21:38)    T(F): 98.1 (17 Mar 2019 04:50), Max: 98.1 (17 Mar 2019 04:50)    HR: 76 (17 Mar 2019 04:50) (68 - 76)    BP: 170/80 (17 Mar 2019 04:50) (170/80 - 173/75)    BP(mean): --    RR: 17 (17 Mar 2019 04:50) (17 - 17)    SpO2: 96% (17 Mar 2019 04:50) (96% - 98%)        General:  NAD    Neurology: A&Ox3, nonfocal, sensation grossly intact    Respiratory: CTA B/L    CV: irregular, S1S2 noted, no murmur appreciated     Abdominal: Soft, NT, ND +BS, Last BM    Extremities: No edema, diminished peripheral pulses 82 yo female w/ PMH of afib on coumadin, CVA and hx of aneurysm repair, HTN, HLD, mitral valve prolapse, PVD s/p stents x 3 (RLE), rheumatoid arthritis, cataracts of the L eye presents with altered mental status from home.        Her daughter noted patient to sound agitated and confused over the phone. On presentation to the ED, patient was oriented x3. UA revealed turbid appearing urine, moderate leuk esterase and positive nitrites, TNTC bacteria, WBC > 50. She complained of chronic urinary incontinence that only occurs at night, though denied increased frequency, dysuria, hematuria.         She was treated with Rocephin. She was also given IVF as patient appeared volume depleted. She was evaluated by PT and found to have no need for skilled PT needs. Patients altered mental status improved, ceftriaxone was stopped, urine cx with ESBL Ecoli switched to macrobid. She was seen by vascular surgery for chronic PAD of right >> left legs and chronically occluded RLE bypass grafts. She has  adequate collateral flow in both legs. There is no limb threatening ischemia in either leg. No  vascular interventions are needed.        Patient  is hemodynamically stable, agrees to go home and follow up as scheduled with her PCP.         Vital Signs Last 24 Hrs    T(C): 36.7 (17 Mar 2019 04:50), Max: 36.7 (16 Mar 2019 21:38)    T(F): 98.1 (17 Mar 2019 04:50), Max: 98.1 (17 Mar 2019 04:50)    HR: 76 (17 Mar 2019 04:50) (68 - 76)    BP: 170/80 (17 Mar 2019 04:50) (170/80 - 173/75)    BP(mean): --    RR: 17 (17 Mar 2019 04:50) (17 - 17)    SpO2: 96% (17 Mar 2019 04:50) (96% - 98%)        General:  NAD    Neurology: A&Ox3, nonfocal, sensation grossly intact    Respiratory: CTA B/L    CV: irregular, S1S2 noted, no murmur appreciated     Abdominal: Soft, NT, ND +BS, Last BM    Extremities: No edema, diminished peripheral pulses

## 2019-03-16 LAB
ANION GAP SERPL CALC-SCNC: 8 MMOL/L — SIGNIFICANT CHANGE UP (ref 5–17)
BUN SERPL-MCNC: 25 MG/DL — HIGH (ref 7–23)
CALCIUM SERPL-MCNC: 8.2 MG/DL — LOW (ref 8.5–10.1)
CHLORIDE SERPL-SCNC: 112 MMOL/L — HIGH (ref 96–108)
CO2 SERPL-SCNC: 26 MMOL/L — SIGNIFICANT CHANGE UP (ref 22–31)
CREAT SERPL-MCNC: 0.95 MG/DL — SIGNIFICANT CHANGE UP (ref 0.5–1.3)
CULTURE RESULTS: SIGNIFICANT CHANGE UP
GLUCOSE SERPL-MCNC: 92 MG/DL — SIGNIFICANT CHANGE UP (ref 70–99)
HCT VFR BLD CALC: 27.1 % — LOW (ref 34.5–45)
HGB BLD-MCNC: 8.4 G/DL — LOW (ref 11.5–15.5)
INR BLD: 2.28 RATIO — HIGH (ref 0.88–1.16)
MCHC RBC-ENTMCNC: 26.8 PG — LOW (ref 27–34)
MCHC RBC-ENTMCNC: 31 GM/DL — LOW (ref 32–36)
MCV RBC AUTO: 86.6 FL — SIGNIFICANT CHANGE UP (ref 80–100)
NRBC # BLD: 0 /100 WBCS — SIGNIFICANT CHANGE UP (ref 0–0)
PLATELET # BLD AUTO: 188 K/UL — SIGNIFICANT CHANGE UP (ref 150–400)
POTASSIUM SERPL-MCNC: 3.5 MMOL/L — SIGNIFICANT CHANGE UP (ref 3.5–5.3)
POTASSIUM SERPL-SCNC: 3.5 MMOL/L — SIGNIFICANT CHANGE UP (ref 3.5–5.3)
PROTHROM AB SERPL-ACNC: 26.7 SEC — HIGH (ref 10–12.9)
RBC # BLD: 3.13 M/UL — LOW (ref 3.8–5.2)
RBC # FLD: 16.7 % — HIGH (ref 10.3–14.5)
SODIUM SERPL-SCNC: 146 MMOL/L — HIGH (ref 135–145)
SPECIMEN SOURCE: SIGNIFICANT CHANGE UP
WBC # BLD: 6.62 K/UL — SIGNIFICANT CHANGE UP (ref 3.8–10.5)
WBC # FLD AUTO: 6.62 K/UL — SIGNIFICANT CHANGE UP (ref 3.8–10.5)

## 2019-03-16 PROCEDURE — 99233 SBSQ HOSP IP/OBS HIGH 50: CPT

## 2019-03-16 RX ORDER — WARFARIN SODIUM 2.5 MG/1
2.5 TABLET ORAL ONCE
Qty: 0 | Refills: 0 | Status: DISCONTINUED | OUTPATIENT
Start: 2019-03-16 | End: 2019-03-16

## 2019-03-16 RX ORDER — WARFARIN SODIUM 2.5 MG/1
1 TABLET ORAL ONCE
Qty: 0 | Refills: 0 | Status: COMPLETED | OUTPATIENT
Start: 2019-03-16 | End: 2019-03-16

## 2019-03-16 RX ADMIN — Medication 50 MILLIGRAM(S): at 13:04

## 2019-03-16 RX ADMIN — Medication 100 MILLIGRAM(S): at 05:08

## 2019-03-16 RX ADMIN — ENOXAPARIN SODIUM 30 MILLIGRAM(S): 100 INJECTION SUBCUTANEOUS at 05:08

## 2019-03-16 RX ADMIN — POLYETHYLENE GLYCOL 3350 17 GRAM(S): 17 POWDER, FOR SOLUTION ORAL at 11:16

## 2019-03-16 RX ADMIN — Medication 120 MILLIGRAM(S): at 05:08

## 2019-03-16 RX ADMIN — SENNA PLUS 2 TABLET(S): 8.6 TABLET ORAL at 21:55

## 2019-03-16 RX ADMIN — WARFARIN SODIUM 1 MILLIGRAM(S): 2.5 TABLET ORAL at 21:55

## 2019-03-16 RX ADMIN — Medication 100 MILLIGRAM(S): at 17:09

## 2019-03-16 RX ADMIN — Medication 50 MILLIGRAM(S): at 05:08

## 2019-03-16 RX ADMIN — Medication 50 MILLIGRAM(S): at 21:55

## 2019-03-16 RX ADMIN — Medication 81 MILLIGRAM(S): at 11:16

## 2019-03-16 NOTE — PROGRESS NOTE ADULT - NSHPATTENDINGPLANDISCUSS_GEN_ALL_CORE
patient, vascular
patient regarding treatment arterial dopplers to r/o occlusive disease, possible vascular consultation, antibiotics, fluids, anticipated hospital course

## 2019-03-16 NOTE — PROGRESS NOTE ADULT - ASSESSMENT
84 yo female w/ PMH of afib on coumadin, CVA and hx of aneurysm repair, HTN, HLD, mitral valve prolapse, PVD s/p stents x 3 (RLE), rheumatoid arthritis, cataracts of the L eye admitted found to have UA turbid appearing with nitrites TNTC bacteria, moderate leuk esterase and WBC c/w UTI      UTI   continue rocephin  f/u urine cx      AMS, likely 2/2 metabolic encephalopathy from UTI.  Resolved.  patient AOx3 on exam       Bilateral foot pain  pt with vascular disease s/p fem-pop bypasses, has seen Dr. Todd Llanes and Dr. Sánchez in the past (2018)  arterial dopplers  vascular surgery Dr Sánchez consulted    HTN   hydralazine 50 mg TID     HLD   atorvastatin 40 mg       Atrial Fib   c/w dilt 120 mg daily  c/w toprol 100 mg daily  coumadin 2.5 mg for 5 days and 5 mg for 2 days per med rec, will dose daily based on INR    DVT proph  on coumadin for afib 84 yo female w/ PMH of afib on coumadin, CVA and hx of aneurysm repair, HTN, HLD, mitral valve prolapse, PVD s/p stents x 3 (RLE), rheumatoid arthritis, cataracts of the L eye admitted found to have UA turbid appearing with nitrites TNTC bacteria, moderate leuk esterase and WBC c/w UTI      UTI   continue rocephin  f/u urine cx      AMS, likely 2/2 metabolic encephalopathy from UTI.  Resolved.  patient AOx3 on exam       Bilateral foot pain  pt with vascular disease s/p fem-pop bypasses, has seen Dr. Todd Llanes and Dr. Sánchez in the past (2018)  arterial dopplers done   vascular surgery Dr Sánchez consulted    HTN   hydralazine 50 mg TID     HLD   atorvastatin 40 mg       Atrial Fib   c/w dilt 120 mg daily  c/w toprol 100 mg daily  coumadin 2.5 mg for 5 days and 5 mg for 2 days per med rec, will dose daily based on INR    DVT proph  on coumadin for afib

## 2019-03-16 NOTE — PROGRESS NOTE ADULT - SUBJECTIVE AND OBJECTIVE BOX
HPI:  82 yo female w/ PMH of afib on coumadin, CVA and hx of aneurysm repair, HTN, HLD, mitral valve prolapse, PVD s/p stents x 3 (RLE), rheumatoid arthritis, cataracts of the L eye presents with altered mental status from home.  Spoke to daughter Dixie over the phone.  Per daughter pt was agitated over the phone when she was speaking to her in the early morning.  When her aide came in later in the morning, the aide texted the daughter stating that her mother was acting unusual and very confused.  She couldn't find the sentences to finish her through processes. Pt was also hallucinating.   Daughter took the patient to her PMDs office, but PA advised her to go to hospital to be evaluated.      At the ED, CT head Surgical changes related to clipping left paraclinoid aneurysm.No CT evidence of an acute territorial infarction.  Atrophy and chronic small vessel ischemic changes.  .  U/A showed +nitrite.    Pt was given 1 dose of rocephin.     When i spoke with patient, she was alert and oriented and wasn't hallucinating anymore.     Venous doppler was done to rule out DVT, it was negative. (14 Mar 2019 20:56)        REVIEW OF SYSTEMS:    CONSTITUTIONAL: No weakness, fevers   EYES/ENT: No visual changes, no throat pain   RESPIRATORY: No cough,  No shortness of breath  CARDIOVASCULAR: No chest pain or palpitations  GASTROINTESTINAL: No abdominal, nausea, vomiting  GENITOURINARY: No dysuria, frequency or hematuria  NEUROLOGICAL: bl LE pain  All other review of systems is negative unless indicated above.    Vital Signs Last 24 Hrs  T(C): 36.6 (16 Mar 2019 12:56), Max: 36.8 (16 Mar 2019 05:00)  T(F): 97.9 (16 Mar 2019 12:56), Max: 98.3 (16 Mar 2019 05:00)  HR: 68 (16 Mar 2019 12:56) (68 - 76)  BP: 173/75 (16 Mar 2019 12:56) (161/74 - 173/75)  BP(mean): --  RR: 17 (16 Mar 2019 12:56) (16 - 17)  SpO2: 98% (16 Mar 2019 12:56) (94% - 98%)      PHYSICAL EXAM:     GENERAL: lying in bed comfortably, NAD   HEENT: NC/AT, EOMI, neck supple, somewhat dry appearing mucous membranes   RESPIRATORY: grossly clear bl  CARDIOVASCULAR: RRR  ABDOMINAL: soft, non-tender, non-distended, positive bowel sounds  : no suprapubic tenderness  EXTREMITIES: dry appearing feet bl, no obvious mottling or other evidence of arterial vascular disease, palpable distal pulses  NEUROLOGICAL: alert and oriented x 3, non-focal, bl foot pain exacerbated with touch  MUSCULOSKELETAL: no gross joint deformity               LABS:                        8.4    6.62  )-----------( 188      ( 16 Mar 2019 07:39 )             27.1     03-16    146<H>  |  112<H>  |  25<H>  ----------------------------<  92  3.5   |  26  |  0.95    Ca    8.2<L>      16 Mar 2019 07:39    TPro  7.0  /  Alb  3.3  /  TBili  0.4  /  DBili  x   /  AST  22  /  ALT  22  /  AlkPhos  69  03-14    PT/INR - ( 16 Mar 2019 07:39 )   PT: 26.7 sec;   INR: 2.28 ratio         PTT - ( 14 Mar 2019 18:12 )  PTT:35.7 sec  Urinalysis Basic - ( 14 Mar 2019 19:43 )    Color: Yellow / Appearance: Turbid / S.005 / pH: x  Gluc: x / Ketone: Negative  / Bili: Negative / Urobili: Negative   Blood: x / Protein: 75 mg/dL / Nitrite: Positive   Leuk Esterase: Moderate / RBC: 0-2 /HPF / WBC >50   Sq Epi: x / Non Sq Epi: Many / Bacteria: TNTC              MEDICATIONS  (STANDING):  aspirin enteric coated 81 milliGRAM(s) Oral daily  atorvastatin 40 milliGRAM(s) Oral at bedtime  cefTRIAXone   IVPB 1 Gram(s) IV Intermittent every 24 hours  diltiazem    milliGRAM(s) Oral daily  enoxaparin Injectable 30 milliGRAM(s) SubCutaneous every 24 hours  hydrALAZINE 50 milliGRAM(s) Oral three times a day  metoprolol succinate  milliGRAM(s) Oral daily

## 2019-03-16 NOTE — CONSULT NOTE ADULT - ASSESSMENT
83 y.o. female with multiple comorbidities has chronic PAD of right >> left legs and chronically occluded RLE bypass grafts. She has  adequate collateral flow in both legs. There is no limb threatening ischemia in either leg. No  vascular interventions are needed. The patient was given informed  consent.

## 2019-03-16 NOTE — CONSULT NOTE ADULT - SUBJECTIVE AND OBJECTIVE BOX
History of Present Illness:  83y.o. Female with a history of PAD was admitted with a UTI and altered mental status changes. She ambulates with a walker. She has had several RLE bypasses and stents. She admits to intermittent paresthesias of both feet but no pedal rest pian or digital ulcers. I was requested to evaluate her LE circulation.    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  Osteoarthritis  Afib  PVD (peripheral vascular disease)  Neuropathy: (Right lower leg)  H/O cerebral aneurysm repair: brain clips  CVA (cerebral vascular accident): (&quot;Mini-stroke&quot;,1990&#x27;s)  Rheumatoid arthritis  Hyperlipidemia  Hypertension  S/P cataract surgery: (Left eye)  Elective surgery: (&quot;Twisted bowel&quot;, 2014)  Elective surgery: (Exision of cyst on liver, 1985)  S/P ORIF (open reduction internal fixation) fracture: Left hip fx (2012) &amp; R hip fx (2013)  H/O cerebral aneurysm repair: Brain clips (1978(  Renal stone: Cysto stent placement 10/1/2014  PVD (peripheral vascular disease): s/p RLE bypass x 3, most recent 3/2012 Right external iliac to PT bypass w/ PTFE (2012)  S/P FRED (total abdominal hysterectomy): (1987, Hx of &quot;ovarian cancer?&quot;)      Allergies    No Known Allergies    Intolerances        MEDICATIONS  (STANDING):  aspirin enteric coated 81 milliGRAM(s) Oral daily  atorvastatin 40 milliGRAM(s) Oral at bedtime  cefTRIAXone   IVPB 1 Gram(s) IV Intermittent every 24 hours  diltiazem    milliGRAM(s) Oral daily  docusate sodium 100 milliGRAM(s) Oral two times a day  hydrALAZINE 50 milliGRAM(s) Oral three times a day  metoprolol succinate  milliGRAM(s) Oral daily  polyethylene glycol 3350 17 Gram(s) Oral daily  senna 2 Tablet(s) Oral at bedtime  sodium chloride 0.9%. 1000 milliLiter(s) (50 mL/Hr) IV Continuous <Continuous>  warfarin 2.5 milliGRAM(s) Oral once    MEDICATIONS  (PRN):      Social History:  Smoking History: Prior long  hx. of smoking  Alcohol Use: social    REVIEW OF SYSTEMS:  CONSTITUTIONAL: No weakness, fevers or chills  EYES/ENT: No visual changes;  No vertigo or throat pain   NECK: No pain or stiffness  RESPIRATORY: No cough, wheezing, hemoptysis; No shortness of breath  CARDIOVASCULAR: No chest pain or palpitations  GASTROINTESTINAL: No abdominal or epigastric pain. No nausea, vomiting, or hematemesis; No diarrhea or constipation. No melena or hematochezia.  GENITOURINARY: ++ dysuria,  and frequency.   No hematuria  NEUROLOGICAL: +++ tingling of both feet  SKIN: No itching, burning, rashes, or lesions   Vascular:  No lower extremity claudication, pedal rest pain or digital ulcers  All other review of systems is negative unless indicated above.    PHYSICAL EXAM:  General:  On exam, the patient is alert nontoxic appearing Female in no acute distress.  Vital Signs Last 24 Hrs  T(C): 36.8 (16 Mar 2019 05:00), Max: 36.8 (16 Mar 2019 05:00)  T(F): 98.3 (16 Mar 2019 05:00), Max: 98.3 (16 Mar 2019 05:00)  HR: 68 (16 Mar 2019 05:00) (68 - 76)  BP: 161/74 (16 Mar 2019 05:00) (122/61 - 166/71)  BP(mean): --  RR: 16 (16 Mar 2019 05:00) (16 - 17)  SpO2: 94% (16 Mar 2019 05:00) (94% - 95%)    Neck:  4+/4+ bilateral carotid pulses; no carotid bruit, no palpable cervical masses.  Heart:  Regular, no murmurs, rubs or gallops.    Lungs:  decreased BS at both bases  Chest:  No chest wall deformities  Symmetrical chest expansion.   Abdomen: Soft and nontender.  No palpable masses.  No rebound, guarding or rigidity.  Extremities:  Feet are warm, pink with normal capillary refill times.  There are no digital ulcers or heel decubiti.  The calf and thigh muscles are soft and nontender.  There are no palpable cords or limb cellulitis.  Sienna's sign  is negative bilaterally.  There is no lower extremity edema, cyanosis, or rubor.  On examination of the peripheral pulses:  Left leg femoral pulse is  3/4  , popliteal pulse is 3/4   ,PT Pulse is 0  , DP Pulse is 2/4   Right leg femoral pulse is  0 ,popliteal pulse is  0  PT Pulse is 0 , DP Pulse is 0  Neurological:  There are no motor or sensory deficits in either lower extremity.                          8.4    6.62  )-----------( 188      ( 16 Mar 2019 07:39 )             27.1     03-16    146<H>  |  112<H>  |  25<H>  ----------------------------<  92  3.5   |  26  |  0.95    Ca    8.2<L>      16 Mar 2019 07:39    TPro  7.0  /  Alb  3.3  /  TBili  0.4  /  DBili  x   /  AST  22  /  ALT  22  /  AlkPhos  69  03-14        PT/INR - ( 16 Mar 2019 07:39 )   PT: 26.7 sec;   INR: 2.28 ratio         PTT - ( 14 Mar 2019 18:12 )  PTT:35.7 sec    Radiology:

## 2019-03-17 ENCOUNTER — TRANSCRIPTION ENCOUNTER (OUTPATIENT)
Age: 84
End: 2019-03-17

## 2019-03-17 VITALS — DIASTOLIC BLOOD PRESSURE: 84 MMHG | SYSTOLIC BLOOD PRESSURE: 152 MMHG | HEART RATE: 78 BPM

## 2019-03-17 DIAGNOSIS — I48.91 UNSPECIFIED ATRIAL FIBRILLATION: ICD-10-CM

## 2019-03-17 LAB
-  AMIKACIN: SIGNIFICANT CHANGE UP
-  AMPICILLIN/SULBACTAM: SIGNIFICANT CHANGE UP
-  AMPICILLIN: SIGNIFICANT CHANGE UP
-  AZTREONAM: SIGNIFICANT CHANGE UP
-  CEFAZOLIN: SIGNIFICANT CHANGE UP
-  CEFEPIME: SIGNIFICANT CHANGE UP
-  CEFOXITIN: SIGNIFICANT CHANGE UP
-  CEFTRIAXONE: SIGNIFICANT CHANGE UP
-  CIPROFLOXACIN: SIGNIFICANT CHANGE UP
-  ERTAPENEM: SIGNIFICANT CHANGE UP
-  GENTAMICIN: SIGNIFICANT CHANGE UP
-  IMIPENEM: SIGNIFICANT CHANGE UP
-  LEVOFLOXACIN: SIGNIFICANT CHANGE UP
-  MEROPENEM: SIGNIFICANT CHANGE UP
-  NITROFURANTOIN: SIGNIFICANT CHANGE UP
-  PIPERACILLIN/TAZOBACTAM: SIGNIFICANT CHANGE UP
-  TIGECYCLINE: SIGNIFICANT CHANGE UP
-  TOBRAMYCIN: SIGNIFICANT CHANGE UP
-  TRIMETHOPRIM/SULFAMETHOXAZOLE: SIGNIFICANT CHANGE UP
ANION GAP SERPL CALC-SCNC: 7 MMOL/L — SIGNIFICANT CHANGE UP (ref 5–17)
BUN SERPL-MCNC: 21 MG/DL — SIGNIFICANT CHANGE UP (ref 7–23)
CALCIUM SERPL-MCNC: 8.4 MG/DL — LOW (ref 8.5–10.1)
CHLORIDE SERPL-SCNC: 111 MMOL/L — HIGH (ref 96–108)
CO2 SERPL-SCNC: 27 MMOL/L — SIGNIFICANT CHANGE UP (ref 22–31)
CREAT SERPL-MCNC: 0.87 MG/DL — SIGNIFICANT CHANGE UP (ref 0.5–1.3)
CULTURE RESULTS: SIGNIFICANT CHANGE UP
GLUCOSE SERPL-MCNC: 91 MG/DL — SIGNIFICANT CHANGE UP (ref 70–99)
HCT VFR BLD CALC: 29.3 % — LOW (ref 34.5–45)
HGB BLD-MCNC: 9.1 G/DL — LOW (ref 11.5–15.5)
INR BLD: 2.06 RATIO — HIGH (ref 0.88–1.16)
MCHC RBC-ENTMCNC: 26.7 PG — LOW (ref 27–34)
MCHC RBC-ENTMCNC: 31.1 GM/DL — LOW (ref 32–36)
MCV RBC AUTO: 85.9 FL — SIGNIFICANT CHANGE UP (ref 80–100)
METHOD TYPE: SIGNIFICANT CHANGE UP
NRBC # BLD: 0 /100 WBCS — SIGNIFICANT CHANGE UP (ref 0–0)
ORGANISM # SPEC MICROSCOPIC CNT: SIGNIFICANT CHANGE UP
ORGANISM # SPEC MICROSCOPIC CNT: SIGNIFICANT CHANGE UP
PLATELET # BLD AUTO: 200 K/UL — SIGNIFICANT CHANGE UP (ref 150–400)
POTASSIUM SERPL-MCNC: 3.4 MMOL/L — LOW (ref 3.5–5.3)
POTASSIUM SERPL-SCNC: 3.4 MMOL/L — LOW (ref 3.5–5.3)
PROTHROM AB SERPL-ACNC: 24 SEC — HIGH (ref 10–12.9)
RBC # BLD: 3.41 M/UL — LOW (ref 3.8–5.2)
RBC # FLD: 16.6 % — HIGH (ref 10.3–14.5)
SODIUM SERPL-SCNC: 145 MMOL/L — SIGNIFICANT CHANGE UP (ref 135–145)
SPECIMEN SOURCE: SIGNIFICANT CHANGE UP
WBC # BLD: 5.98 K/UL — SIGNIFICANT CHANGE UP (ref 3.8–10.5)
WBC # FLD AUTO: 5.98 K/UL — SIGNIFICANT CHANGE UP (ref 3.8–10.5)

## 2019-03-17 PROCEDURE — 87086 URINE CULTURE/COLONY COUNT: CPT

## 2019-03-17 PROCEDURE — 70450 CT HEAD/BRAIN W/O DYE: CPT

## 2019-03-17 PROCEDURE — 80053 COMPREHEN METABOLIC PANEL: CPT

## 2019-03-17 PROCEDURE — 93005 ELECTROCARDIOGRAM TRACING: CPT

## 2019-03-17 PROCEDURE — 85027 COMPLETE CBC AUTOMATED: CPT

## 2019-03-17 PROCEDURE — 71045 X-RAY EXAM CHEST 1 VIEW: CPT

## 2019-03-17 PROCEDURE — 83690 ASSAY OF LIPASE: CPT

## 2019-03-17 PROCEDURE — 85610 PROTHROMBIN TIME: CPT

## 2019-03-17 PROCEDURE — 87880 STREP A ASSAY W/OPTIC: CPT

## 2019-03-17 PROCEDURE — 93971 EXTREMITY STUDY: CPT

## 2019-03-17 PROCEDURE — 97161 PT EVAL LOW COMPLEX 20 MIN: CPT

## 2019-03-17 PROCEDURE — 87186 SC STD MICRODIL/AGAR DIL: CPT

## 2019-03-17 PROCEDURE — 96374 THER/PROPH/DIAG INJ IV PUSH: CPT

## 2019-03-17 PROCEDURE — 85730 THROMBOPLASTIN TIME PARTIAL: CPT

## 2019-03-17 PROCEDURE — 99239 HOSP IP/OBS DSCHRG MGMT >30: CPT

## 2019-03-17 PROCEDURE — 83880 ASSAY OF NATRIURETIC PEPTIDE: CPT

## 2019-03-17 PROCEDURE — 80048 BASIC METABOLIC PNL TOTAL CA: CPT

## 2019-03-17 PROCEDURE — 99285 EMERGENCY DEPT VISIT HI MDM: CPT | Mod: 25

## 2019-03-17 PROCEDURE — 87081 CULTURE SCREEN ONLY: CPT

## 2019-03-17 PROCEDURE — 36415 COLL VENOUS BLD VENIPUNCTURE: CPT

## 2019-03-17 PROCEDURE — 81001 URINALYSIS AUTO W/SCOPE: CPT

## 2019-03-17 RX ORDER — SENNA PLUS 8.6 MG/1
2 TABLET ORAL
Qty: 0 | Refills: 0 | DISCHARGE
Start: 2019-03-17

## 2019-03-17 RX ORDER — NITROFURANTOIN MACROCRYSTAL 50 MG
1 CAPSULE ORAL
Qty: 5 | Refills: 0
Start: 2019-03-17

## 2019-03-17 RX ORDER — NITROFURANTOIN MACROCRYSTAL 50 MG
100 CAPSULE ORAL
Qty: 0 | Refills: 0 | Status: DISCONTINUED | OUTPATIENT
Start: 2019-03-17 | End: 2019-03-17

## 2019-03-17 RX ORDER — WARFARIN SODIUM 2.5 MG/1
2 TABLET ORAL ONCE
Qty: 0 | Refills: 0 | Status: DISCONTINUED | OUTPATIENT
Start: 2019-03-17 | End: 2019-03-17

## 2019-03-17 RX ADMIN — Medication 50 MILLIGRAM(S): at 13:11

## 2019-03-17 RX ADMIN — Medication 100 MILLIGRAM(S): at 05:20

## 2019-03-17 RX ADMIN — Medication 81 MILLIGRAM(S): at 11:01

## 2019-03-17 RX ADMIN — Medication 50 MILLIGRAM(S): at 05:20

## 2019-03-17 RX ADMIN — CEFTRIAXONE 100 GRAM(S): 500 INJECTION, POWDER, FOR SOLUTION INTRAMUSCULAR; INTRAVENOUS at 00:00

## 2019-03-17 RX ADMIN — ATORVASTATIN CALCIUM 40 MILLIGRAM(S): 80 TABLET, FILM COATED ORAL at 00:00

## 2019-03-17 RX ADMIN — Medication 100 MILLIGRAM(S): at 12:47

## 2019-03-17 RX ADMIN — POLYETHYLENE GLYCOL 3350 17 GRAM(S): 17 POWDER, FOR SOLUTION ORAL at 11:01

## 2019-03-17 RX ADMIN — Medication 120 MILLIGRAM(S): at 05:20

## 2019-03-17 NOTE — DISCHARGE NOTE NURSING/CASE MANAGEMENT/SOCIAL WORK - NSDCPEPTCOWAR_GEN_ALL_CORE
Coumadin/Warfarin - Compliance/Coumadin/Warfarin - Follow up monitoring/Coumadin/Warfarin - Dietary Advice/Coumadin/Warfarin - Potential for adverse drug reactions and interactions

## 2019-03-17 NOTE — DISCHARGE NOTE NURSING/CASE MANAGEMENT/SOCIAL WORK - NSDCDPATPORTLINK_GEN_ALL_CORE
You can access the [facility] Patient Portal, offered by [facility], by registering with the following website: [email or web address]

## 2019-03-18 LAB — INR PPP: 1.8

## 2019-03-26 ENCOUNTER — MEDICATION RENEWAL (OUTPATIENT)
Age: 84
End: 2019-03-26

## 2019-03-26 ENCOUNTER — OTHER (OUTPATIENT)
Age: 84
End: 2019-03-26

## 2019-03-26 LAB — INR PPP: 3.53

## 2019-04-09 ENCOUNTER — OTHER (OUTPATIENT)
Age: 84
End: 2019-04-09

## 2019-04-09 LAB — INR PPP: 1.21

## 2019-04-16 LAB — INR PPP: 2

## 2019-05-01 LAB — INR PPP: 2.11

## 2019-05-21 ENCOUNTER — OTHER (OUTPATIENT)
Age: 84
End: 2019-05-21

## 2019-05-21 LAB — INR PPP: 3

## 2019-06-05 ENCOUNTER — RX RENEWAL (OUTPATIENT)
Age: 84
End: 2019-06-05

## 2019-06-06 ENCOUNTER — RX RENEWAL (OUTPATIENT)
Age: 84
End: 2019-06-06

## 2019-06-08 ENCOUNTER — INPATIENT (INPATIENT)
Facility: HOSPITAL | Age: 84
LOS: 8 days | Discharge: HOME | DRG: 377 | End: 2019-06-17
Attending: INTERNAL MEDICINE | Admitting: INTERNAL MEDICINE
Payer: MEDICARE

## 2019-06-08 VITALS
DIASTOLIC BLOOD PRESSURE: 78 MMHG | HEIGHT: 61 IN | SYSTOLIC BLOOD PRESSURE: 138 MMHG | OXYGEN SATURATION: 99 % | WEIGHT: 110.01 LBS | TEMPERATURE: 99 F | HEART RATE: 92 BPM | RESPIRATION RATE: 16 BRPM

## 2019-06-08 DIAGNOSIS — Z41.9 ENCOUNTER FOR PROCEDURE FOR PURPOSES OTHER THAN REMEDYING HEALTH STATE, UNSPECIFIED: Chronic | ICD-10-CM

## 2019-06-08 DIAGNOSIS — Z96.7 PRESENCE OF OTHER BONE AND TENDON IMPLANTS: Chronic | ICD-10-CM

## 2019-06-08 DIAGNOSIS — Z98.89 OTHER SPECIFIED POSTPROCEDURAL STATES: Chronic | ICD-10-CM

## 2019-06-08 DIAGNOSIS — Z29.9 ENCOUNTER FOR PROPHYLACTIC MEASURES, UNSPECIFIED: ICD-10-CM

## 2019-06-08 DIAGNOSIS — I73.9 PERIPHERAL VASCULAR DISEASE, UNSPECIFIED: ICD-10-CM

## 2019-06-08 DIAGNOSIS — K92.2 GASTROINTESTINAL HEMORRHAGE, UNSPECIFIED: ICD-10-CM

## 2019-06-08 DIAGNOSIS — M06.9 RHEUMATOID ARTHRITIS, UNSPECIFIED: ICD-10-CM

## 2019-06-08 DIAGNOSIS — N20.0 CALCULUS OF KIDNEY: Chronic | ICD-10-CM

## 2019-06-08 DIAGNOSIS — I34.1 NONRHEUMATIC MITRAL (VALVE) PROLAPSE: ICD-10-CM

## 2019-06-08 DIAGNOSIS — I63.9 CEREBRAL INFARCTION, UNSPECIFIED: ICD-10-CM

## 2019-06-08 DIAGNOSIS — I48.91 UNSPECIFIED ATRIAL FIBRILLATION: ICD-10-CM

## 2019-06-08 DIAGNOSIS — I10 ESSENTIAL (PRIMARY) HYPERTENSION: ICD-10-CM

## 2019-06-08 DIAGNOSIS — Z98.49 CATARACT EXTRACTION STATUS, UNSPECIFIED EYE: Chronic | ICD-10-CM

## 2019-06-08 LAB
ALBUMIN SERPL ELPH-MCNC: 3.2 G/DL — LOW (ref 3.3–5)
ALP SERPL-CCNC: 49 U/L — SIGNIFICANT CHANGE UP (ref 40–120)
ALT FLD-CCNC: 21 U/L — SIGNIFICANT CHANGE UP (ref 12–78)
ANION GAP SERPL CALC-SCNC: 11 MMOL/L — SIGNIFICANT CHANGE UP (ref 5–17)
APTT BLD: 23.4 SEC — LOW (ref 28.5–37)
APTT BLD: 32.3 SEC — SIGNIFICANT CHANGE UP (ref 28.5–37)
AST SERPL-CCNC: 18 U/L — SIGNIFICANT CHANGE UP (ref 15–37)
BASOPHILS # BLD AUTO: 0 K/UL — SIGNIFICANT CHANGE UP (ref 0–0.2)
BASOPHILS NFR BLD AUTO: 0 % — SIGNIFICANT CHANGE UP (ref 0–2)
BILIRUB SERPL-MCNC: 0.3 MG/DL — SIGNIFICANT CHANGE UP (ref 0.2–1.2)
BUN SERPL-MCNC: 90 MG/DL — HIGH (ref 7–23)
CALCIUM SERPL-MCNC: 8 MG/DL — LOW (ref 8.5–10.1)
CHLORIDE SERPL-SCNC: 115 MMOL/L — HIGH (ref 96–108)
CO2 SERPL-SCNC: 21 MMOL/L — LOW (ref 22–31)
CREAT SERPL-MCNC: 1.2 MG/DL — SIGNIFICANT CHANGE UP (ref 0.5–1.3)
EOSINOPHIL # BLD AUTO: 0 K/UL — SIGNIFICANT CHANGE UP (ref 0–0.5)
EOSINOPHIL NFR BLD AUTO: 0 % — SIGNIFICANT CHANGE UP (ref 0–6)
GLUCOSE SERPL-MCNC: 161 MG/DL — HIGH (ref 70–99)
HCT VFR BLD CALC: 16 % — CRITICAL LOW (ref 34.5–45)
HCT VFR BLD CALC: 32.9 % — LOW (ref 34.5–45)
HGB BLD-MCNC: 10.8 G/DL — LOW (ref 11.5–15.5)
HGB BLD-MCNC: 4.9 G/DL — CRITICAL LOW (ref 11.5–15.5)
INR BLD: 1.09 RATIO — SIGNIFICANT CHANGE UP (ref 0.88–1.16)
INR BLD: 2.85 RATIO — HIGH (ref 0.88–1.16)
LACTATE SERPL-SCNC: 2.2 MMOL/L — HIGH (ref 0.7–2)
LACTATE SERPL-SCNC: 3.4 MMOL/L — HIGH (ref 0.7–2)
LIDOCAIN IGE QN: 73 U/L — SIGNIFICANT CHANGE UP (ref 73–393)
LYMPHOCYTES # BLD AUTO: 0.58 K/UL — LOW (ref 1–3.3)
LYMPHOCYTES # BLD AUTO: 4 % — LOW (ref 13–44)
MAGNESIUM SERPL-MCNC: 2 MG/DL — SIGNIFICANT CHANGE UP (ref 1.6–2.6)
MCHC RBC-ENTMCNC: 26.9 PG — LOW (ref 27–34)
MCHC RBC-ENTMCNC: 29.2 PG — SIGNIFICANT CHANGE UP (ref 27–34)
MCHC RBC-ENTMCNC: 30.6 GM/DL — LOW (ref 32–36)
MCHC RBC-ENTMCNC: 32.8 GM/DL — SIGNIFICANT CHANGE UP (ref 32–36)
MCV RBC AUTO: 87.9 FL — SIGNIFICANT CHANGE UP (ref 80–100)
MCV RBC AUTO: 88.9 FL — SIGNIFICANT CHANGE UP (ref 80–100)
MONOCYTES # BLD AUTO: 0 K/UL — SIGNIFICANT CHANGE UP (ref 0–0.9)
MONOCYTES NFR BLD AUTO: 0 % — LOW (ref 2–14)
NEUTROPHILS # BLD AUTO: 13.9 K/UL — HIGH (ref 1.8–7.4)
NEUTROPHILS NFR BLD AUTO: 96 % — HIGH (ref 43–77)
NRBC # BLD: 0 /100 WBCS — SIGNIFICANT CHANGE UP (ref 0–0)
NRBC # BLD: SIGNIFICANT CHANGE UP /100 WBCS (ref 0–0)
OB PNL STL: POSITIVE
PLATELET # BLD AUTO: 149 K/UL — LOW (ref 150–400)
PLATELET # BLD AUTO: 179 K/UL — SIGNIFICANT CHANGE UP (ref 150–400)
POTASSIUM SERPL-MCNC: 3.6 MMOL/L — SIGNIFICANT CHANGE UP (ref 3.5–5.3)
POTASSIUM SERPL-SCNC: 3.6 MMOL/L — SIGNIFICANT CHANGE UP (ref 3.5–5.3)
PROT SERPL-MCNC: 6 G/DL — SIGNIFICANT CHANGE UP (ref 6–8.3)
PROTHROM AB SERPL-ACNC: 12.5 SEC — SIGNIFICANT CHANGE UP (ref 10–12.9)
PROTHROM AB SERPL-ACNC: 33.5 SEC — HIGH (ref 10–12.9)
RBC # BLD: 1.82 M/UL — LOW (ref 3.8–5.2)
RBC # BLD: 3.7 M/UL — LOW (ref 3.8–5.2)
RBC # FLD: 15.4 % — HIGH (ref 10.3–14.5)
RBC # FLD: 16.8 % — HIGH (ref 10.3–14.5)
SODIUM SERPL-SCNC: 147 MMOL/L — HIGH (ref 135–145)
WBC # BLD: 14.48 K/UL — HIGH (ref 3.8–10.5)
WBC # BLD: 15.42 K/UL — HIGH (ref 3.8–10.5)
WBC # FLD AUTO: 14.48 K/UL — HIGH (ref 3.8–10.5)
WBC # FLD AUTO: 15.42 K/UL — HIGH (ref 3.8–10.5)

## 2019-06-08 PROCEDURE — 71275 CT ANGIOGRAPHY CHEST: CPT | Mod: 26

## 2019-06-08 PROCEDURE — 71045 X-RAY EXAM CHEST 1 VIEW: CPT | Mod: 26

## 2019-06-08 PROCEDURE — 93010 ELECTROCARDIOGRAM REPORT: CPT

## 2019-06-08 PROCEDURE — 74174 CTA ABD&PLVS W/CONTRAST: CPT | Mod: 26

## 2019-06-08 PROCEDURE — 74019 RADEX ABDOMEN 2 VIEWS: CPT | Mod: 26

## 2019-06-08 PROCEDURE — 99291 CRITICAL CARE FIRST HOUR: CPT

## 2019-06-08 PROCEDURE — 99223 1ST HOSP IP/OBS HIGH 75: CPT | Mod: AI

## 2019-06-08 RX ORDER — HYDROMORPHONE HYDROCHLORIDE 2 MG/ML
0.5 INJECTION INTRAMUSCULAR; INTRAVENOUS; SUBCUTANEOUS ONCE
Refills: 0 | Status: DISCONTINUED | OUTPATIENT
Start: 2019-06-08 | End: 2019-06-08

## 2019-06-08 RX ORDER — ONDANSETRON 8 MG/1
4 TABLET, FILM COATED ORAL ONCE
Refills: 0 | Status: COMPLETED | OUTPATIENT
Start: 2019-06-08 | End: 2019-06-08

## 2019-06-08 RX ORDER — SODIUM CHLORIDE 9 MG/ML
1000 INJECTION INTRAMUSCULAR; INTRAVENOUS; SUBCUTANEOUS ONCE
Refills: 0 | Status: COMPLETED | OUTPATIENT
Start: 2019-06-08 | End: 2019-06-08

## 2019-06-08 RX ORDER — HYDROMORPHONE HYDROCHLORIDE 2 MG/ML
0.5 INJECTION INTRAMUSCULAR; INTRAVENOUS; SUBCUTANEOUS EVERY 4 HOURS
Refills: 0 | Status: DISCONTINUED | OUTPATIENT
Start: 2019-06-08 | End: 2019-06-09

## 2019-06-08 RX ORDER — SODIUM CHLORIDE 9 MG/ML
3 INJECTION INTRAMUSCULAR; INTRAVENOUS; SUBCUTANEOUS ONCE
Refills: 0 | Status: COMPLETED | OUTPATIENT
Start: 2019-06-08 | End: 2019-06-08

## 2019-06-08 RX ORDER — PROTHROMBIN COMPLEX CONCENTRATE (HUMAN) 25.5; 16.5; 24; 22; 22; 26 [IU]/ML; [IU]/ML; [IU]/ML; [IU]/ML; [IU]/ML; [IU]/ML
1500 POWDER, FOR SOLUTION INTRAVENOUS ONCE
Refills: 0 | Status: COMPLETED | OUTPATIENT
Start: 2019-06-08 | End: 2019-06-08

## 2019-06-08 RX ORDER — PANTOPRAZOLE SODIUM 20 MG/1
40 TABLET, DELAYED RELEASE ORAL ONCE
Refills: 0 | Status: COMPLETED | OUTPATIENT
Start: 2019-06-08 | End: 2019-06-08

## 2019-06-08 RX ORDER — PANTOPRAZOLE SODIUM 20 MG/1
8 TABLET, DELAYED RELEASE ORAL
Qty: 80 | Refills: 0 | Status: DISCONTINUED | OUTPATIENT
Start: 2019-06-08 | End: 2019-06-12

## 2019-06-08 RX ORDER — CHLORHEXIDINE GLUCONATE 213 G/1000ML
1 SOLUTION TOPICAL
Refills: 0 | Status: DISCONTINUED | OUTPATIENT
Start: 2019-06-08 | End: 2019-06-17

## 2019-06-08 RX ORDER — PHYTONADIONE (VIT K1) 5 MG
10 TABLET ORAL ONCE
Refills: 0 | Status: COMPLETED | OUTPATIENT
Start: 2019-06-08 | End: 2019-06-08

## 2019-06-08 RX ORDER — METHOCARBAMOL 500 MG/1
750 TABLET, FILM COATED ORAL ONCE
Refills: 0 | Status: COMPLETED | OUTPATIENT
Start: 2019-06-08 | End: 2019-06-08

## 2019-06-08 RX ADMIN — PROTHROMBIN COMPLEX CONCENTRATE (HUMAN) 1500 INTERNATIONAL UNIT(S): 25.5; 16.5; 24; 22; 22; 26 POWDER, FOR SOLUTION INTRAVENOUS at 12:09

## 2019-06-08 RX ADMIN — HYDROMORPHONE HYDROCHLORIDE 0.5 MILLIGRAM(S): 2 INJECTION INTRAMUSCULAR; INTRAVENOUS; SUBCUTANEOUS at 14:00

## 2019-06-08 RX ADMIN — HYDROMORPHONE HYDROCHLORIDE 0.5 MILLIGRAM(S): 2 INJECTION INTRAMUSCULAR; INTRAVENOUS; SUBCUTANEOUS at 10:43

## 2019-06-08 RX ADMIN — PANTOPRAZOLE SODIUM 10 MG/HR: 20 TABLET, DELAYED RELEASE ORAL at 10:54

## 2019-06-08 RX ADMIN — PANTOPRAZOLE SODIUM 10 MG/HR: 20 TABLET, DELAYED RELEASE ORAL at 19:08

## 2019-06-08 RX ADMIN — Medication 10 MILLIGRAM(S): at 12:38

## 2019-06-08 RX ADMIN — CHLORHEXIDINE GLUCONATE 1 APPLICATION(S): 213 SOLUTION TOPICAL at 16:53

## 2019-06-08 RX ADMIN — PANTOPRAZOLE SODIUM 40 MILLIGRAM(S): 20 TABLET, DELAYED RELEASE ORAL at 10:51

## 2019-06-08 RX ADMIN — HYDROMORPHONE HYDROCHLORIDE 0.5 MILLIGRAM(S): 2 INJECTION INTRAMUSCULAR; INTRAVENOUS; SUBCUTANEOUS at 12:19

## 2019-06-08 RX ADMIN — HYDROMORPHONE HYDROCHLORIDE 0.5 MILLIGRAM(S): 2 INJECTION INTRAMUSCULAR; INTRAVENOUS; SUBCUTANEOUS at 18:29

## 2019-06-08 RX ADMIN — HYDROMORPHONE HYDROCHLORIDE 0.5 MILLIGRAM(S): 2 INJECTION INTRAMUSCULAR; INTRAVENOUS; SUBCUTANEOUS at 20:05

## 2019-06-08 RX ADMIN — METHOCARBAMOL 750 MILLIGRAM(S): 500 TABLET, FILM COATED ORAL at 22:39

## 2019-06-08 RX ADMIN — HYDROMORPHONE HYDROCHLORIDE 0.5 MILLIGRAM(S): 2 INJECTION INTRAMUSCULAR; INTRAVENOUS; SUBCUTANEOUS at 17:24

## 2019-06-08 RX ADMIN — PROTHROMBIN COMPLEX CONCENTRATE (HUMAN) 400 INTERNATIONAL UNIT(S): 25.5; 16.5; 24; 22; 22; 26 POWDER, FOR SOLUTION INTRAVENOUS at 11:55

## 2019-06-08 RX ADMIN — SODIUM CHLORIDE 1000 MILLILITER(S): 9 INJECTION INTRAMUSCULAR; INTRAVENOUS; SUBCUTANEOUS at 10:47

## 2019-06-08 RX ADMIN — SODIUM CHLORIDE 1000 MILLILITER(S): 9 INJECTION INTRAMUSCULAR; INTRAVENOUS; SUBCUTANEOUS at 12:03

## 2019-06-08 RX ADMIN — HYDROMORPHONE HYDROCHLORIDE 0.5 MILLIGRAM(S): 2 INJECTION INTRAMUSCULAR; INTRAVENOUS; SUBCUTANEOUS at 20:35

## 2019-06-08 RX ADMIN — Medication 102 MILLIGRAM(S): at 12:08

## 2019-06-08 RX ADMIN — HYDROMORPHONE HYDROCHLORIDE 0.5 MILLIGRAM(S): 2 INJECTION INTRAMUSCULAR; INTRAVENOUS; SUBCUTANEOUS at 11:27

## 2019-06-08 RX ADMIN — SODIUM CHLORIDE 1000 MILLILITER(S): 9 INJECTION INTRAMUSCULAR; INTRAVENOUS; SUBCUTANEOUS at 12:00

## 2019-06-08 RX ADMIN — HYDROMORPHONE HYDROCHLORIDE 0.5 MILLIGRAM(S): 2 INJECTION INTRAMUSCULAR; INTRAVENOUS; SUBCUTANEOUS at 12:17

## 2019-06-08 RX ADMIN — HYDROMORPHONE HYDROCHLORIDE 0.5 MILLIGRAM(S): 2 INJECTION INTRAMUSCULAR; INTRAVENOUS; SUBCUTANEOUS at 13:44

## 2019-06-08 RX ADMIN — ONDANSETRON 4 MILLIGRAM(S): 8 TABLET, FILM COATED ORAL at 10:48

## 2019-06-08 RX ADMIN — HYDROMORPHONE HYDROCHLORIDE 0.5 MILLIGRAM(S): 2 INJECTION INTRAMUSCULAR; INTRAVENOUS; SUBCUTANEOUS at 13:30

## 2019-06-08 RX ADMIN — SODIUM CHLORIDE 3 MILLILITER(S): 9 INJECTION INTRAMUSCULAR; INTRAVENOUS; SUBCUTANEOUS at 10:53

## 2019-06-08 RX ADMIN — SODIUM CHLORIDE 1000 MILLILITER(S): 9 INJECTION INTRAMUSCULAR; INTRAVENOUS; SUBCUTANEOUS at 13:46

## 2019-06-08 RX ADMIN — HYDROMORPHONE HYDROCHLORIDE 0.5 MILLIGRAM(S): 2 INJECTION INTRAMUSCULAR; INTRAVENOUS; SUBCUTANEOUS at 11:29

## 2019-06-08 NOTE — CONSULT NOTE ADULT - SUBJECTIVE AND OBJECTIVE BOX
[facility]  Yang Figueroa PA-C  [address] [place] [address] [phone]      Chief Complaint:  Patient is a 84y old  Female who presents with a chief complaint of     HPI: 84f PAF on coumadin, HFPEF (most recent ef normal on a limited study, performed 4/2018).  She has a history of a CVA and hx of a clipped cerebral aneurysm.  She has a history of HTN, HLD, mitral valve prolapse, PAD s/p bypass/stents, redo bypass, and a degree of residual non revascularizable disease, with associated sxs.   She presents to the er brought by her daughter.  she was noted to have abdominal pain hematemesis and black stool    Allergies:  No Known Allergies      Medications:  chlorhexidine 2% Cloths 1 Application(s) Topical <User Schedule>  pantoprazole Infusion 8 mG/Hr IV Continuous <Continuous>      PMHX/PSHX:  OM (osteomyelitis)  Mitral valve prolapse  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  Osteoarthritis  Afib  PVD (peripheral vascular disease)  Neuropathy  Gout  H/O cerebral aneurysm repair  CVA (cerebral vascular accident)  Rheumatoid arthritis  Asthma  Hyperlipidemia  Hypertension  S/P cataract surgery  Elective surgery  Elective surgery  S/P ORIF (open reduction internal fixation) fracture  H/O cerebral aneurysm repair  Renal stone  PVD (peripheral vascular disease)  S/P FRED (total abdominal hysterectomy)      Family history:  No pertinent family history in first degree relatives  Family history of aneurysm      Social History:     ROS:     General:  No wt loss, fevers, chills, night sweats, fatigue,   Eyes:  Good vision, no reported pain  ENT:  No sore throat, pain, runny nose, dysphagia  CV:  No pain, palpitations, hypo/hypertension  Resp:  No dyspnea, cough, tachypnea, wheezing  GI:  + pain, No nausea, No vomiting, No diarrhea, No constipation, No weight loss, No fever, No pruritis, No rectal bleeding, No tarry stools, No dysphagia,  :  No pain, bleeding, incontinence, nocturia  Muscle:  No pain, weakness  Neuro:  No weakness, tingling, memory problems  Psych:  No fatigue, insomnia, mood problems, depression  Endocrine:  No polyuria, polydipsia, cold/heat intolerance  Heme:  No petechiae, ecchymosis, easy bruisability  Skin:  No rash, tattoos, scars, edema      PHYSICAL EXAM:   Vital Signs:  Vital Signs Last 24 Hrs  T(C): 36.4 (08 Jun 2019 13:15), Max: 37.7 (08 Jun 2019 10:40)  T(F): 97.5 (08 Jun 2019 13:15), Max: 99.9 (08 Jun 2019 10:40)  HR: 101 (08 Jun 2019 13:15) (92 - 105)  BP: 127/77 (08 Jun 2019 13:15) (108/74 - 138/78)  BP(mean): --  RR: 20 (08 Jun 2019 13:15) (16 - 22)  SpO2: 98% (08 Jun 2019 12:17) (98% - 100%)  Daily Height in cm: 154.94 (08 Jun 2019 10:19)    Daily     GENERAL:  Appears stated age, well-groomed, well-nourished, no distress  HEENT:  NC/AT,  conjunctivae clear and pink, no thyromegaly, nodules, adenopathy, no JVD, sclera -anicteric  CHEST:  Full & symmetric excursion, no increased effort, breath sounds clear  HEART:  Regular rhythm, S1, S2, no murmur/rub/S3/S4, no abdominal bruit, no edema  ABDOMEN:  Soft, non-tender, non-distended, normoactive bowel sounds,  no masses ,no hepato-splenomegaly, no signs of chronic liver disease  EXTEREMITIES:  no cyanosis,clubbing or edema  SKIN:  No rash/erythema/ecchymoses/petechiae/wounds/abscess/warm/dry  NEURO:  Alert, oriented, no asterixis, no tremor, no encephalopathy    LABS:                        4.9    14.48 )-----------( 179      ( 08 Jun 2019 10:52 )             16.0     06-08    147<H>  |  115<H>  |  90<H>  ----------------------------<  161<H>  3.6   |  21<L>  |  1.20    Ca    8.0<L>      08 Jun 2019 10:52  Mg     2.2     06-08    TPro  6.0  /  Alb  3.2<L>  /  TBili  0.3  /  DBili  x   /  AST  18  /  ALT  21  /  AlkPhos  49  06-08    LIVER FUNCTIONS - ( 08 Jun 2019 10:52 )  Alb: 3.2 g/dL / Pro: 6.0 g/dL / ALK PHOS: 49 U/L / ALT: 21 U/L / AST: 18 U/L / GGT: x           PT/INR - ( 08 Jun 2019 10:52 )   PT: 33.5 sec;   INR: 2.85 ratio         PTT - ( 08 Jun 2019 10:52 )  PTT:32.3 sec    Amylase Serum--      Lipase serum73       Ammonia--      Imaging:

## 2019-06-08 NOTE — H&P ADULT - NSHPREVIEWOFSYSTEMS_GEN_ALL_CORE
REVIEW OF SYSTEMS:    CONSTITUTIONAL: No weakness, fevers or chills  EYES/ENT: No visual changes;  No vertigo or throat pain   NECK: No pain or stiffness  RESPIRATORY: No cough, wheezing, hemoptysis; No shortness of breath  CARDIOVASCULAR: No chest pain or palpitations  GASTROINTESTINAL: + abdominal /epigastric pain, hematemesis; No diarrhea or constipation. +melena  GENITOURINARY: No dysuria, frequency or hematuria  NEUROLOGICAL: No numbness or weakness  SKIN: No itching, burning, rashes, or lesions   All other review of systems is negative unless indicated above.

## 2019-06-08 NOTE — H&P ADULT - ASSESSMENT
85 yo female w/ PMH of afib on coumadin, CVA and hx of aneurysm repair, HTN, HLD, mitral valve prolapse, PVD s/p stents x 3 (RLE), rheumatoid arthritis, cataracts comes with black stools and severe anemia, upper GIB

## 2019-06-08 NOTE — ED ADULT NURSE NOTE - NSFALLRSKASSESSDT_ED_ALL_ED
Reason For Visit  Ruchi Sabillon is an established patient here today for a follow-up management of Hypertension. :  services not used. Ruchi Sabillon accepted a chaperone. [id number] to ([facility])  Requested for: [id number]; [id number] to (Last EC:[id number])  Requested for: [id number] 89:46FG   Systolic: 151  Diastolic: 80  Recorded: [id number] CST    Electronically signed by : Danetta Lanes Delonte Moss MD; Nov 29 2018  9:43AM CST 08-Jun-2019 12:00

## 2019-06-08 NOTE — CONSULT NOTE ADULT - ASSESSMENT
84f PAF on coumadin, HFPEF (most recent ef normal on a limited study, performed 4/2018).  She has a history of a CVA and hx of a clipped cerebral aneurysm.  She has a history of HTN, HLD, mitral valve prolapse, PAD s/p bypass/stents, redo bypass, and a degree of residual non revascularizable disease, with associated sxs.  She does not clearly have a hx of cad.  She had nuclear stress testing in 2014.  She was found to have an inferolateral defect ultimately deemed artifact, though the description could conceivably have represented ischemia.    She presents to the er brought by her daughter.  She has been having some combination of coffee grounds emesis, possibly hematemesis, and melanotic stool since last night.  Though she initially felt better, she began to deteriorate at home and was brought to the er.  She was found to be pale/ashen, ill appearing, tachycardic.  EKG was ischemic appearing.  She has been found to be severely anemic, with evidence of reduced end organ perfusion with an elevated lactate.  Cardiology consultation is now being obtained.    Denies chest discomfort and shortness of breath.  No definite abdominal pain at this time, though she has had some.  No new neurologic symptoms. She reports pain in her right leg.    -there is evidence of ischemia on ekg  -would cycle ce which I am sure will be abnormal  -ischemia will be on the basis of demand from severe anemia and not acs  -volume and blood are appropriate to treat the ischemia at this time    -no arrhythmias noted thus far  -is in sr with a h/o paf  -very likely to develop af, and would monitor for this  -therapeutic inr, to be reversed with kcentra and vit k  -takes dilt 120 and toprol 100 daily, and these should be given if possible, depending upon her bp  -would be reasonable to provide short acting meds, 30 q6 of dilt, and 25 q6 of metop, instead of long acting meds, in the setting of this critical illness with the potential for hemodynamic compromise    -no volume overload  -most recent ef felt normal  -would not place artifical limits on volume administration over hf concerns    -trend lactate    The patient is at risk of abrupt decompensation.  I have personally provided  35 minutes of critical care time, excluding time spent on separate procedures.
85 y/o female with PMH of CVA (no deficits), A-fib on coumadin, PVD, PUD, HTN, HLD presents to [facility] for severe abdomial pain, melena, and coffee ground emesis x 1 day. Found to have hemoglobin of 4.9. Most likely UGIB from ulcer, s/p 3 units PRBCs and INR reversal with K-centra.     Plan discussed with ICU attending Dr. Crowley    Problem List:   1)GI bleed  2) acute blood loss anemia  3) BRIDGET  4) coagulopathy   5) a-fib     Neuro: Abdominal pain, will order Dilaudid 0.5mg q4 hours for severe pain     CV: a-fib on coumadin, INR 2.8 on presentation with GI bleed. Hold coumadin, INR reversal with K-centra and vit K (given in ER), follow up PT/INR. Hold home dose metoprolol 100mg daily, cardizem 120mg daily, hydralazine 50mg q8. Can restart those meds if HR increases and she remains normotensive.     PULM: stable on 2L NC, attempt to wean as tolerates. Evidence of emphysema on CT scan, 50 yr smoking history since quit, not on BD at home.     GI: suspect UGIB given coffee ground emesis and melena with history of PUD. s/p 3units prbcs, INr reversal. GI has seen the patient. Protonix gtt. Repeat CBC 10, will repeat again in 6 hours. Will need endoscopy.     RENAL: BRIDGET suspect prerenal from hypovolemia in the setting of bleed. s/p 2L NS and 3units PRBCS, follow up repeat BMP    HEME: see above, SCDs for DVt-P    ENDO: no active issues    DISPO: to remain in ICU as the patient poses a high risk for decompensation and progression of GI bleed given emesis, and tachycardia.

## 2019-06-08 NOTE — CONSULT NOTE ADULT - ATTENDING COMMENTS
Agree with above note.  83 yo F with Hx Afib on coumadin, PVD, PUD presenting with UGIB and also severe abdominal pain.  Received 3 PRBCs and Kcentra.  BP is normal but she is mildly tachycardic.  Hb improved from 4.9 to 10.8 after 3 PRBCs  abdominal pain on admission seems to have resolved, and she was ruled out for AAA or disection with CTA.  This evening she complains of back and R shoulder pain.  She has reproducible pain over R trapezius area.  ?muscle spasm.    Continue PPI gtt  serial CBCs and transfuse prn  possible EGD next week  may require restating Afib agents.
No

## 2019-06-08 NOTE — H&P ADULT - NSHPPHYSICALEXAM_GEN_ALL_CORE
ICU Vital Signs Last 24 Hrs  T(C): 36.5 (08 Jun 2019 14:50), Max: 37.7 (08 Jun 2019 10:40)  T(F): 97.7 (08 Jun 2019 14:50), Max: 99.9 (08 Jun 2019 10:40)  HR: 98 (08 Jun 2019 14:50) (92 - 105)  BP: 147/77 (08 Jun 2019 14:50) (108/74 - 147/77)  BP(mean): --  ABP: --  ABP(mean): --  RR: 16 (08 Jun 2019 14:50) (16 - 22)  SpO2: 98% (08 Jun 2019 12:17) (98% - 100%)    General: frail, pale  Neurology: A&Ox3, nonfocal  Respiratory: CTA B/L  CV: RRR, S1S2,   Abdominal: Soft, epigastric tenderness +BS  Extremities: No edema, + peripheral pulses

## 2019-06-08 NOTE — H&P ADULT - HISTORY OF PRESENT ILLNESS
85 yo female w/ PMH of afib on coumadin, CVA and hx of aneurysm repair, HTN, HLD, mitral valve prolapse, PVD s/p stents x 3 (RLE), rheumatoid arthritis, cataracts of the L eye comes to the ER with complaints of bloody stool. As per patient since yesterday she started having adbominal pain with "black stool" and coffee ground emesis. She denies any recent chest pain, SOB. Patient came to the ER noted to have Hbg of 4.9. Given Kcentra, Vit K, tranfusion.CTA done no aneurysm leakage. Evaluated by ICU warranting ICU admission.

## 2019-06-08 NOTE — H&P ADULT - NSHPLABSRESULTS_GEN_ALL_CORE
LABS:                        4.9    14.48 )-----------( 179      ( 08 Jun 2019 10:52 )             16.0     06-08    147<H>  |  115<H>  |  90<H>  ----------------------------<  161<H>  3.6   |  21<L>  |  1.20    Ca    8.0<L>      08 Jun 2019 10:52  Mg     2.2     06-08    TPro  6.0  /  Alb  3.2<L>  /  TBili  0.3  /  DBili  x   /  AST  18  /  ALT  21  /  AlkPhos  49  06-08    PT/INR - ( 08 Jun 2019 10:52 )   PT: 33.5 sec;   INR: 2.85 ratio         PTT - ( 08 Jun 2019 10:52 )  PTT:32.3 sec    < from: CT Angio Chest w/ IV Cont (06.08.19 @ 13:02) >    IMPRESSION:  No significant change in the appearance of infrarenal   abdominal aortic aneurysm without evidence of leakage.  Small calcified gallstones are again noted  Nonobstructive right lower pole renal calculus is again seen in  Postsurgical changes with surgical clips in the left pelvis and right   lower quadrant small bowel sutures.  Right external iliac stent with graft extending into the right groin.   There is no flow seen within the stent/graft similar to the prior   contrast-enhanced study suggesting occlusion.

## 2019-06-08 NOTE — CONSULT NOTE ADULT - SUBJECTIVE AND OBJECTIVE BOX
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  Osteoarthritis  Afib  PVD (peripheral vascular disease)  Neuropathy: (Right lower leg)  H/O cerebral aneurysm repair: brain clips  CVA (cerebral vascular accident): (&quot;Mini-stroke&quot;,1990&#x27;s)  Rheumatoid arthritis  Hyperlipidemia  Hypertension  S/P cataract surgery: (Left eye)  Elective surgery: (&quot;Twisted bowel&quot;, 2014)  Elective surgery: (Exision of cyst on liver, 1985)  S/P ORIF (open reduction internal fixation) fracture: Left hip fx (2012) &amp; R hip fx (2013)  H/O cerebral aneurysm repair: Brain clips (1978(  Renal stone: Cysto stent placement 10/1/2014  PVD (peripheral vascular disease): s/p RLE bypass x 3, most recent 3/2012 Right external iliac to PT bypass w/ PTFE (2012)  S/P FRED (total abdominal hysterectomy): (1987, Hx of &quot;ovarian cancer?&quot;)      Review of Systems:  CONSTITUTIONAL: No fever, chills, or fatigue  EYES: No eye pain, visual disturbances, or discharge  ENMT:  No difficulty hearing, tinnitus, vertigo; No sinus or throat pain  NECK: No pain or stiffness  RESPIRATORY: No cough, wheezing, chills or hemoptysis; No shortness of breath  CARDIOVASCULAR: No chest pain, palpitations, dizziness, or leg swelling  GASTROINTESTINAL: No abdominal or epigastric pain. No nausea, vomiting, or hematemesis; No diarrhea or constipation. No melena or hematochezia.  GENITOURINARY: No dysuria, frequency, hematuria, or incontinence  NEUROLOGICAL: No headaches, memory loss, loss of strength, numbness, or tremors  SKIN: No itching, burning, rashes, or lesions   MUSCULOSKELETAL: No joint pain or swelling; No muscle, back, or extremity pain  PSYCHIATRIC: No depression, anxiety, mood swings, or difficulty sleeping      Medications:        HYDROmorphone  Injectable 0.5 milliGRAM(s) IV Push every 4 hours PRN        pantoprazole Infusion 8 mG/Hr IV Continuous <Continuous>            chlorhexidine 2% Cloths 1 Application(s) Topical <User Schedule>            ICU Vital Signs Last 24 Hrs  T(C): 36.7 (08 Jun 2019 16:35), Max: 37.7 (08 Jun 2019 10:40)  T(F): 98 (08 Jun 2019 16:35), Max: 99.9 (08 Jun 2019 10:40)  HR: 105 (08 Jun 2019 17:00) (92 - 105)  BP: 165/94 (08 Jun 2019 17:00) (108/74 - 165/94)  BP(mean): 124 (08 Jun 2019 17:00) (122 - 124)  ABP: --  ABP(mean): --  RR: 19 (08 Jun 2019 17:00) (15 - 24)  SpO2: 94% (08 Jun 2019 17:00) (94% - 100%)          I&O's Detail        LABS:                        4.9    14.48 )-----------( 179      ( 08 Jun 2019 10:52 )             16.0     06-08    147<H>  |  115<H>  |  90<H>  ----------------------------<  161<H>  3.6   |  21<L>  |  1.20    Ca    8.0<L>      08 Jun 2019 10:52  Mg     2.2     06-08    TPro  6.0  /  Alb  3.2<L>  /  TBili  0.3  /  DBili  x   /  AST  18  /  ALT  21  /  AlkPhos  49  06-08          CAPILLARY BLOOD GLUCOSE        PT/INR - ( 08 Jun 2019 10:52 )   PT: 33.5 sec;   INR: 2.85 ratio         PTT - ( 08 Jun 2019 10:52 )  PTT:32.3 sec    CULTURES:      Physical Examination:    General: No acute distress.  Alert, oriented, interactive, nonfocal    HEENT: Pupils equal, reactive to light.  Symmetric.    PULM: Clear to auscultation bilaterally, no significant sputum production    CVS: Regular rate and rhythm, no murmurs, rubs, or gallops    ABD: Soft, nondistended, nontender, normoactive bowel sounds, no masses    EXT: No edema, nontender    SKIN: Warm and well perfused, no rashes noted.    NEURO: A&Ox3, strength 5/5 all extremities, cranial nerves grossly intact, no focal deficits      RADIOLOGY: ***      CRITICAL CARE TIME SPENT: ***  Evaluating/treating patient, reviewing data/labs/imaging, discussing case with multidisciplinary team, discussing plan/goals of care with patient/family. Non-inclusive of procedure time. 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  Osteoarthritis  Afib  PVD (peripheral vascular disease)  Neuropathy: (Right lower leg)  H/O cerebral aneurysm repair: brain clips  CVA (cerebral vascular accident): (&quot;Mini-stroke&quot;,1990&#x27;s)  Rheumatoid arthritis  Hyperlipidemia  Hypertension  S/P cataract surgery: (Left eye)  Elective surgery: (&quot;Twisted bowel&quot;, 2014)  Elective surgery: (Exision of cyst on liver, 1985)  S/P ORIF (open reduction internal fixation) fracture: Left hip fx (2012) &amp; R hip fx (2013)  H/O cerebral aneurysm repair: Brain clips (1978(  Renal stone: Cysto stent placement 10/1/2014  PVD (peripheral vascular disease): s/p RLE bypass x 3, most recent 3/2012 Right external iliac to PT bypass w/ PTFE (2012)  S/P FRED (total abdominal hysterectomy): (1987, Hx of &quot;ovarian cancer?&quot;)      Review of Systems:  CONSTITUTIONAL: No fever, chills, or fatigue  EYES: No eye pain, visual disturbances, or discharge  ENMT:  No difficulty hearing, tinnitus, vertigo; No sinus or throat pain  NECK: No pain or stiffness  RESPIRATORY: No cough, wheezing, chills or hemoptysis; No shortness of breath  CARDIOVASCULAR: No chest pain, palpitations, dizziness, or leg swelling  GASTROINTESTINAL: No abdominal or epigastric pain. No nausea, vomiting, or hematemesis; No diarrhea or constipation. No melena or hematochezia.  GENITOURINARY: No dysuria, frequency, hematuria, or incontinence  NEUROLOGICAL: No headaches, memory loss, loss of strength, numbness, or tremors  SKIN: No itching, burning, rashes, or lesions   MUSCULOSKELETAL: No joint pain or swelling; No muscle, back, or extremity pain  PSYCHIATRIC: No depression, anxiety, mood swings, or difficulty sleeping      Medications:        HYDROmorphone  Injectable 0.5 milliGRAM(s) IV Push every 4 hours PRN        pantoprazole Infusion 8 mG/Hr IV Continuous <Continuous>            chlorhexidine 2% Cloths 1 Application(s) Topical <User Schedule>            ICU Vital Signs Last 24 Hrs  T(C): 36.7 (08 Jun 2019 16:35), Max: 37.7 (08 Jun 2019 10:40)  T(F): 98 (08 Jun 2019 16:35), Max: 99.9 (08 Jun 2019 10:40)  HR: 105 (08 Jun 2019 17:00) (92 - 105)  BP: 165/94 (08 Jun 2019 17:00) (108/74 - 165/94)  BP(mean): 124 (08 Jun 2019 17:00) (122 - 124)  ABP: --  ABP(mean): --  RR: 19 (08 Jun 2019 17:00) (15 - 24)  SpO2: 94% (08 Jun 2019 17:00) (94% - 100%)          I&O's Detail        LABS:                        4.9    14.48 )-----------( 179      ( 08 Jun 2019 10:52 )             16.0     06-08    147<H>  |  115<H>  |  90<H>  ----------------------------<  161<H>  3.6   |  21<L>  |  1.20    Ca    8.0<L>      08 Jun 2019 10:52  Mg     2.2     06-08    TPro  6.0  /  Alb  3.2<L>  /  TBili  0.3  /  DBili  x   /  AST  18  /  ALT  21  /  AlkPhos  49  06-08          CAPILLARY BLOOD GLUCOSE        PT/INR - ( 08 Jun 2019 10:52 )   PT: 33.5 sec;   INR: 2.85 ratio         PTT - ( 08 Jun 2019 10:52 )  PTT:32.3 sec    CULTURES:      Physical Examination:    General: No acute distress.  Alert, oriented, interactive, nonfocal    HEENT: Pupils equal, reactive to light.  Symmetric.    PULM: Clear to auscultation bilaterally, no significant sputum production    CVS: Regular rate and rhythm, no murmurs, rubs, or gallops    ABD: Soft, nondistended, nontender, normoactive bowel sounds, no masses    EXT: No edema, nontender    SKIN: Warm and well perfused, no rashes noted.    NEURO: A&Ox3, strength 5/5 all extremities, cranial nerves grossly intact, no focal deficits      RADIOLOGY: EXAM:  CT ANGIO CHEST (W)AW IC                          EXAM:  CT ANGIO ABD PELV (W)AW IC                            PROCEDURE DATE:  06/08/2019          INTERPRETATION:  CTA CHEST/ABDOMEN/PELVIS:     CLINICAL INFORMATION: Pain with abdominal aortic aneurysm. Rule out   rupture    COMPARISON: CT abdomen and pelvis of 1/13/2019 and older study of   7/21/2018..    PROCEDURE:  Using multislice helical CT, 2.5 mm sections were obtained   from the thoracic inlet through the ischial tuberosities withthe   administration of intravenous contrast. Oral contrast was not   administered. 95 cc of Omnipaque 350 were administered. MIP sequences are   also performed. Preliminary noncontrast imaging was also performed.    Coronal and sagittal reformationswere performed by  CT technologist and   made available for review.    FINDINGS:  The visualized structures of the lower neck are unremarkable.   The visualized aorta is of normal course and caliber. The heart is not   enlarged. There is no evidence of pericardial effusion. There is   atherosclerotic calcification of the thoracic aorta and its branches.   There is tracheobronchial calcification. There is coronary artery   calcification.    There is no evidence of axillary, hilar, or mediastinal lymphadenopathy.    No focal lung consolidations identified. No evidence of pleural effusion   or pneumothorax. No pulmonary nodules identified. There is extensive   centrilobular emphysema. There is scarring at the lung bases with   probable honeycombing at the right lung base      The liver is of normal contour. there is no evidence of mass. The   gallbladder is remarkable for multiple calcified gallstones No evidence   of intra or extrahepatic biliary dilatation.     The pancreas, spleen, and adrenal glands are grossly unremarkable. There   is no evidence of hydronephrosis or perinephric stranding. No evidence of    nephrolithiasis.The bladder is unremarkable. There is nonobstructive   right lower pole renal calculus measuring 7 mm. There isno   hydronephrosis.    No evidence of lymphadenopathy utilizing CT size criteria. The aorta   demonstrates saccular aneurysm of the mid aorta just below the level of   the renal arteries with a configuration similar to that seen on the   previous study. There is atherosclerotic calcification of the abdominal   aorta and its branches. A stent is seen on the right at the level of the   external iliac artery with graft extending to the groin.    There is no evidence of bowel obstruction. There is no evidence of   pneumoperitoneum or free fluid. There is diverticulosis without   diverticulitis. Surgical clips are seen in the left pelvis. Surgical   sutures are again seen in the right lower quadrant and in the small bowel    The visualized osseous structures demonstrate osteopenia and degenerative   changes of the spine. There is bilateral hip ORIF.    IMPRESSION:  No significant change in the appearance of infrarenal   abdominal aortic aneurysm without evidence of leakage.  Small calcified gallstones are again noted  Nonobstructive right lower pole renal calculus is again seen in  Postsurgical changes with surgical clips in the left pelvis and right   lower quadrant small bowel sutures.  Right external iliac stent with graft extending into the right groin.   There is no flow seen within the stent/graft similar to the prior   contrast-enhanced study suggesting occlusion.        SHIMON TOSCANO M.D.,ATTENDING RADIOLOGIST  This document has been electronically signed. Jun 8 2019  1:22PM        CRITICAL CARE TIME SPENT: 60min minutes assessing presenting problems of acute illness, which pose high probability of life threatening deterioration or end organ damage/dysfunction, as well as medical decision making including initiating plan of care, reviewing data, reviewing radiologic exams, discussing with multidisciplinary team,  discussing goals of care with patient/family, and writing this note.  Non-inclusive of procedures performed, Patient is a 84y old  Female who presents with a chief complaint of     HPI: 83 y/o female with PMH of CVA (no deficits), A-fib on coumadin, PVD, PUD, HTN, HLD presents to [facility] hospital for severe abdomial pain, melena, and coffee ground emesis x 1 day. Patient and daughter both provided history. They report yesterday the patient had a very dark/black bowel movement in the evening. This morning the abdominal pain started, the patient cannot characterize it, but it "hurts a lot". Per daughter report she had a dark/black bowel movement and had dark vomitus this AM. She lives alone at home a long with an aide. She denies NSAID use. Patient does report noticing darker stools infrequently over the last few weeks.  Has a history of PUD about 6 years ago which she follows with Dr. Perera for. At that time she did not have any interventions. Last endoscopy abotu 2 yrs ago and per family report "everything looked fine".     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  Osteoarthritis  Afib  PVD (peripheral vascular disease)  Neuropathy: (Right lower leg)  H/O cerebral aneurysm repair: brain clips  CVA (cerebral vascular accident): (&quot;Mini-stroke&quot;,1990&#x27;s)  Rheumatoid arthritis  Hyperlipidemia  Hypertension  S/P cataract surgery: (Left eye)  Elective surgery: (&quot;Twisted bowel&quot;, 2014)  Elective surgery: (Exision of cyst on liver, 1985)  S/P ORIF (open reduction internal fixation) fracture: Left hip fx (2012) &amp; R hip fx (2013)  H/O cerebral aneurysm repair: Brain clips (1978(  Renal stone: Cysto stent placement 10/1/2014  PVD (peripheral vascular disease): s/p RLE bypass x 3, most recent 3/2012 Right external iliac to PT bypass w/ PTFE (2012)  S/P FRED (total abdominal hysterectomy): (1987, Hx of &quot;ovarian cancer?&quot;)      Review of Systems:  CONSTITUTIONAL: No fever, chills, or fatigue  EYES: No eye pain, visual disturbances, or discharge  ENMT:  No difficulty hearing, tinnitus, vertigo; No sinus or throat pain  NECK: No pain or stiffness  RESPIRATORY: No cough, wheezing, chills or hemoptysis; No shortness of breath  CARDIOVASCULAR: No chest pain, palpitations, dizziness, or leg swelling  GASTROINTESTINAL: No abdominal or epigastric pain. No nausea, vomiting, or hematemesis; No diarrhea or constipation. No melena or hematochezia.  GENITOURINARY: No dysuria, frequency, hematuria, or incontinence  NEUROLOGICAL: No headaches, memory loss, loss of strength, numbness, or tremors  SKIN: No itching, burning, rashes, or lesions   MUSCULOSKELETAL: No joint pain or swelling; No muscle, back, or extremity pain  PSYCHIATRIC: No depression, anxiety, mood swings, or difficulty sleeping      Medications:        HYDROmorphone  Injectable 0.5 milliGRAM(s) IV Push every 4 hours PRN        pantoprazole Infusion 8 mG/Hr IV Continuous <Continuous>            chlorhexidine 2% Cloths 1 Application(s) Topical <User Schedule>            ICU Vital Signs Last 24 Hrs  T(C): 36.7 (08 Jun 2019 16:35), Max: 37.7 (08 Jun 2019 10:40)  T(F): 98 (08 Jun 2019 16:35), Max: 99.9 (08 Jun 2019 10:40)  HR: 105 (08 Jun 2019 17:00) (92 - 105)  BP: 165/94 (08 Jun 2019 17:00) (108/74 - 165/94)  BP(mean): 124 (08 Jun 2019 17:00) (122 - 124)  ABP: --  ABP(mean): --  RR: 19 (08 Jun 2019 17:00) (15 - 24)  SpO2: 94% (08 Jun 2019 17:00) (94% - 100%)          I&O's Detail        LABS:                        4.9    14.48 )-----------( 179      ( 08 Jun 2019 10:52 )             16.0     06-08    147<H>  |  115<H>  |  90<H>  ----------------------------<  161<H>  3.6   |  21<L>  |  1.20    Ca    8.0<L>      08 Jun 2019 10:52  Mg     2.2     06-08    TPro  6.0  /  Alb  3.2<L>  /  TBili  0.3  /  DBili  x   /  AST  18  /  ALT  21  /  AlkPhos  49  06-08          CAPILLARY BLOOD GLUCOSE        PT/INR - ( 08 Jun 2019 10:52 )   PT: 33.5 sec;   INR: 2.85 ratio         PTT - ( 08 Jun 2019 10:52 )  PTT:32.3 sec    CULTURES:      Physical Examination:    General: No acute distress.  Alert, oriented, interactive, nonfocal    HEENT: Pupils equal, reactive to light.  Symmetric.    PULM: Clear to auscultation bilaterally, no significant sputum production    CVS: Regular rate and rhythm, no murmurs, rubs, or gallops    ABD: Soft, nondistended, nontender, normoactive bowel sounds, no masses    EXT: No edema, nontender    SKIN: Warm and well perfused, no rashes noted.    NEURO: A&Ox3, strength 5/5 all extremities, cranial nerves grossly intact, no focal deficits      RADIOLOGY: EXAM:  CT ANGIO CHEST (W)AW IC                          EXAM:  CT ANGIO ABD PELV (W)AW IC                            PROCEDURE DATE:  06/08/2019          INTERPRETATION:  CTA CHEST/ABDOMEN/PELVIS:     CLINICAL INFORMATION: Pain with abdominal aortic aneurysm. Rule out   rupture    COMPARISON: CT abdomen and pelvis of 1/13/2019 and older study of   7/21/2018..    PROCEDURE:  Using multislice helical CT, 2.5 mm sections were obtained   from the thoracic inlet through the ischial tuberosities withthe   administration of intravenous contrast. Oral contrast was not   administered. 95 cc of Omnipaque 350 were administered. MIP sequences are   also performed. Preliminary noncontrast imaging was also performed.    Coronal and sagittal reformationswere performed by  CT technologist and   made available for review.    FINDINGS:  The visualized structures of the lower neck are unremarkable.   The visualized aorta is of normal course and caliber. The heart is not   enlarged. There is no evidence of pericardial effusion. There is   atherosclerotic calcification of the thoracic aorta and its branches.   There is tracheobronchial calcification. There is coronary artery   calcification.    There is no evidence of axillary, hilar, or mediastinal lymphadenopathy.    No focal lung consolidations identified. No evidence of pleural effusion   or pneumothorax. No pulmonary nodules identified. There is extensive   centrilobular emphysema. There is scarring at the lung bases with   probable honeycombing at the right lung base      The liver is of normal contour. there is no evidence of mass. The   gallbladder is remarkable for multiple calcified gallstones No evidence   of intra or extrahepatic biliary dilatation.     The pancreas, spleen, and adrenal glands are grossly unremarkable. There   is no evidence of hydronephrosis or perinephric stranding. No evidence of    nephrolithiasis.The bladder is unremarkable. There is nonobstructive   right lower pole renal calculus measuring 7 mm. There isno   hydronephrosis.    No evidence of lymphadenopathy utilizing CT size criteria. The aorta   demonstrates saccular aneurysm of the mid aorta just below the level of   the renal arteries with a configuration similar to that seen on the   previous study. There is atherosclerotic calcification of the abdominal   aorta and its branches. A stent is seen on the right at the level of the   external iliac artery with graft extending to the groin.    There is no evidence of bowel obstruction. There is no evidence of   pneumoperitoneum or free fluid. There is diverticulosis without   diverticulitis. Surgical clips are seen in the left pelvis. Surgical   sutures are again seen in the right lower quadrant and in the small bowel    The visualized osseous structures demonstrate osteopenia and degenerative   changes of the spine. There is bilateral hip ORIF.    IMPRESSION:  No significant change in the appearance of infrarenal   abdominal aortic aneurysm without evidence of leakage.  Small calcified gallstones are again noted  Nonobstructive right lower pole renal calculus is again seen in  Postsurgical changes with surgical clips in the left pelvis and right   lower quadrant small bowel sutures.  Right external iliac stent with graft extending into the right groin.   There is no flow seen within the stent/graft similar to the prior   contrast-enhanced study suggesting occlusion.        SHIMON TOSCANO M.D.,ATTENDING RADIOLOGIST  This document has been electronically signed. Jun 8 2019  1:22PM        CRITICAL CARE TIME SPENT: 60min minutes assessing presenting problems of acute illness, which pose high probability of life threatening deterioration or end organ damage/dysfunction, as well as medical decision making including initiating plan of care, reviewing data, reviewing radiologic exams, discussing with multidisciplinary team,  discussing goals of care with patient/family, and writing this note.  Non-inclusive of procedures performed, Patient is a 84y old  Female who presents with a chief complaint of     HPI: 83 y/o female with PMH of CVA (no deficits), A-fib on coumadin, PVD, PUD, HTN, HLD presents to [facility] for severe abdomial pain, melena, and coffee ground emesis x 1 day. Patient and daughter both provided history. They report yesterday the patient had a very dark/black bowel movement in the evening. This morning the abdominal pain started, the patient cannot characterize it, but it "hurts a lot". Per daughter report she had a dark/black bowel movement and had dark vomitus this AM. She lives alone at home a long with an aide. She denies NSAID use. Patient does report noticing darker stools infrequently over the last few weeks.  Has a history of PUD about 6 years ago which she follows with Dr. Perera for. At that time she did not have any interventions. Last endoscopy abotu 2 yrs ago and per family report "everything looked fine".     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  Osteoarthritis  Afib  PVD (peripheral vascular disease)  Neuropathy: (Right lower leg)  H/O cerebral aneurysm repair: brain clips  CVA (cerebral vascular accident): (&quot;Mini-stroke&quot;,1990&#x27;s)  Rheumatoid arthritis  Hyperlipidemia  Hypertension  S/P cataract surgery: (Left eye)  Elective surgery: (&quot;Twisted bowel&quot;, 2014)  Elective surgery: (Exision of cyst on liver, 1985)  S/P ORIF (open reduction internal fixation) fracture: Left hip fx (2012) &amp; R hip fx (2013)  H/O cerebral aneurysm repair: Brain clips (1978(  Renal stone: Cysto stent placement 10/1/2014  PVD (peripheral vascular disease): s/p RLE bypass x 3, most recent 3/2012 Right external iliac to PT bypass w/ PTFE (2012)  S/P FRED (total abdominal hysterectomy): (1987, Hx of &quot;ovarian cancer?&quot;)      Review of Systems:  CONSTITUTIONAL: No fever, chills, or fatigue  EYES: No eye pain, visual disturbances, or discharge  ENMT:  No difficulty hearing, tinnitus, vertigo; No sinus or throat pain  NECK: No pain or stiffness  RESPIRATORY: No cough, wheezing, chills or hemoptysis; No shortness of breath  CARDIOVASCULAR: No chest pain, palpitations, dizziness, or leg swelling  GASTROINTESTINAL: + abdominal and epigastric pain. + nausea, vomiting, hematemesis; No diarrhea or constipation. + melena   GENITOURINARY: No dysuria, frequency, hematuria, or incontinence  NEUROLOGICAL: No headaches, memory loss, loss of strength, numbness, or tremors  SKIN: No itching, burning, rashes, or lesions   MUSCULOSKELETAL: No joint pain or swelling; No muscle, back, or extremity pain  PSYCHIATRIC: No depression, anxiety, mood swings, or difficulty sleeping      Medications:  HYDROmorphone  Injectable 0.5 milliGRAM(s) IV Push every 4 hours PRN  pantoprazole Infusion 8 mG/Hr IV Continuous <Continuous>  chlorhexidine 2% Cloths 1 Application(s) Topical <User Schedule>    ICU Vital Signs  T(C): 36.7  T(F): 98   HR: 105   BP: 165/94   BP(mean): 124  RR: 19   SpO2: 94%       I&O's Detail        LABS:                        4.9    14.48 )-----------( 179      ( 08 Jun 2019 10:52 )             16.0     06-08    147<H>  |  115<H>  |  90<H>  ----------------------------<  161<H>  3.6   |  21<L>  |  1.20    Ca    8.0<L>      08 Jun 2019 10:52  Mg     2.2     06-08    TPro  6.0  /  Alb  3.2<L>  /  TBili  0.3  /  DBili  x   /  AST  18  /  ALT  21  /  AlkPhos  49  06-08      CAPILLARY BLOOD GLUCOSE        PT/INR - ( 08 Jun 2019 10:52 )   PT: 33.5 sec;   INR: 2.85 ratio         PTT - ( 08 Jun 2019 10:52 )  PTT:32.3 sec    CULTURES:      Physical Examination:    General: Appears pale, in severe abdominal pain     HEENT: Pupils equal, reactive to light.  Symmetric.    PULM: Clear to auscultation bilaterally, no significant sputum production    CVS: Regular rate and rhythm, no murmurs, rubs, or gallops    ABD: Soft, nondistended, diffusely tender to light palpation, normoactive bowel sounds     EXT: No edema, nontender    SKIN: Warm and well perfused, no rashes noted.    NEURO: A&Ox3, strength 5/5 all extremities, cranial nerves grossly intact, no focal deficits      RADIOLOGY: EXAM:  CT ANGIO CHEST (W)AW IC                          EXAM:  CT ANGIO ABD PELV (W)AW IC                            PROCEDURE DATE:  06/08/2019          INTERPRETATION:  CTA CHEST/ABDOMEN/PELVIS:     CLINICAL INFORMATION: Pain with abdominal aortic aneurysm. Rule out   rupture    COMPARISON: CT abdomen and pelvis of 1/13/2019 and older study of   7/21/2018..    PROCEDURE:  Using multislice helical CT, 2.5 mm sections were obtained   from the thoracic inlet through the ischial tuberosities withthe   administration of intravenous contrast. Oral contrast was not   administered. 95 cc of Omnipaque 350 were administered. MIP sequences are   also performed. Preliminary noncontrast imaging was also performed.    Coronal and sagittal reformationswere performed by  CT technologist and   made available for review.    FINDINGS:  The visualized structures of the lower neck are unremarkable.   The visualized aorta is of normal course and caliber. The heart is not   enlarged. There is no evidence of pericardial effusion. There is   atherosclerotic calcification of the thoracic aorta and its branches.   There is tracheobronchial calcification. There is coronary artery   calcification.    There is no evidence of axillary, hilar, or mediastinal lymphadenopathy.    No focal lung consolidations identified. No evidence of pleural effusion   or pneumothorax. No pulmonary nodules identified. There is extensive   centrilobular emphysema. There is scarring at the lung bases with   probable honeycombing at the right lung base      The liver is of normal contour. there is no evidence of mass. The   gallbladder is remarkable for multiple calcified gallstones No evidence   of intra or extrahepatic biliary dilatation.     The pancreas, spleen, and adrenal glands are grossly unremarkable. There   is no evidence of hydronephrosis or perinephric stranding. No evidence of    nephrolithiasis.The bladder is unremarkable. There is nonobstructive   right lower pole renal calculus measuring 7 mm. There isno   hydronephrosis.    No evidence of lymphadenopathy utilizing CT size criteria. The aorta   demonstrates saccular aneurysm of the mid aorta just below the level of   the renal arteries with a configuration similar to that seen on the   previous study. There is atherosclerotic calcification of the abdominal   aorta and its branches. A stent is seen on the right at the level of the   external iliac artery with graft extending to the groin.    There is no evidence of bowel obstruction. There is no evidence of   pneumoperitoneum or free fluid. There is diverticulosis without   diverticulitis. Surgical clips are seen in the left pelvis. Surgical   sutures are again seen in the right lower quadrant and in the small bowel    The visualized osseous structures demonstrate osteopenia and degenerative   changes of the spine. There is bilateral hip ORIF.    IMPRESSION:  No significant change in the appearance of infrarenal   abdominal aortic aneurysm without evidence of leakage.  Small calcified gallstones are again noted  Nonobstructive right lower pole renal calculus is again seen in  Postsurgical changes with surgical clips in the left pelvis and right   lower quadrant small bowel sutures.  Right external iliac stent with graft extending into the right groin.   There is no flow seen within the stent/graft similar to the prior   contrast-enhanced study suggesting occlusion.        SHIMON TOSCANO M.D.,ATTENDING RADIOLOGIST  This document has been electronically signed. Jun 8 2019  1:22PM        CRITICAL CARE TIME SPENT: 60min minutes assessing presenting problems of acute illness, which pose high probability of life threatening deterioration or end organ damage/dysfunction, as well as medical decision making including initiating plan of care, reviewing data, reviewing radiologic exams, discussing with multidisciplinary team,  discussing goals of care with patient/family, and writing this note.  Non-inclusive of procedures performed,

## 2019-06-08 NOTE — ED PROVIDER NOTE - OBJECTIVE STATEMENT
pt is a 85yo female with pmhx of a-fib on coumadin, htn, hld, pvd s/p stents, cva c/o abd pain x today. pt reports dark brown vomit with blood with black stools since yesterday. pt reports she has associated epigastric abd pain. pt with hx of GIB in the past, sees dr moses.   GI: Dieudonne  PMD: marialuisa pt is a 83yo female with pmhx of a-fib on coumadin, htn, hld, pvd s/p stents, cva c/o abd pain x today. pt reports dark brown vomit with blood with black stools since yesterday. pt reports she has associated epigastric abd pain. pt with hx of GIB in the past, sees dr moses. pt denies fever, cp.   GI: Dieudonne  PMD: marialuisa

## 2019-06-08 NOTE — H&P ADULT - PROBLEM SELECTOR PLAN 2
hold coumadin  monitor electrolytes closely K>4 Mg >2  Consider metoprolol IVP if BP permits  management as per ICU and cardiology

## 2019-06-08 NOTE — ED PROVIDER NOTE - PROGRESS NOTE DETAILS
hgb 4.9 emergent PRBC ordered. consent in chart. icu consulted pending call back.  cardiologist dr welch consulted, at bedside. hgb 4.9 emergent PRBC ordered. consent in chart. icu consulted pending call back.  cardiologist dr welch consulted, at bedside. gi consulted pending call back icu daniel chen consulted will see pt in ed. hgb 4.9 emergent PRBC, vitamin K and KCENTRA ordered. consent in chart. icu consulted pending call back.  cardiologist dr welch consulted, at bedside. gi consulted pending call back pt seen and evaluated by GI. will follow up ct scan. labs and imaging reviewed. will admit to ICU

## 2019-06-08 NOTE — ED ADULT NURSE NOTE - NSIMPLEMENTINTERV_GEN_ALL_ED
Implemented All Fall with Harm Risk Interventions:  [place] to call system. Call bell, personal items and telephone within reach. Instruct patient to call for assistance. Room bathroom lighting operational. Non-slip footwear when patient is off stretcher. Physically safe environment: no spills, clutter or unnecessary equipment. Stretcher in lowest position, wheels locked, appropriate side rails in place. Provide visual cue, wrist band, yellow gown, etc. Monitor gait and stability. Monitor for mental status changes and reorient to person, place, and time. Review medications for side effects contributing to fall risk. Reinforce activity limits and safety measures with patient and family. Provide visual clues: red socks.

## 2019-06-08 NOTE — ED PROVIDER NOTE - CLINICAL SUMMARY MEDICAL DECISION MAKING FREE TEXT BOX
labs including cbc to eval for anemia, INR and type and screen,fecal occult to r/o GIB,  abd x ray to r/o free air, ivf, Protonix drip, zofran, monitor, ct scan, consult GI. labs including cbc to eval for anemia, INR and type and screen, fecal occult to r/o GIB,  abd x ray to r/o free air, ivf, Protonix drip, zofran, monitor, ct scan to r/o aneurysm rupture, consult GI,

## 2019-06-08 NOTE — ED ADULT NURSE NOTE - OBJECTIVE STATEMENT
Pt received in bed alert and oriented with the c/o abd pain, nausea, vomiting coffee ground emesis. Pt also c/o tarry stools. Pt is very pain and skin is warm to the touch. As per Md's orders IV thomas palced blood specimen obtained and sent to the lab. Pt given IVF bolus and is orders Protonix drip and PRBC transfusion, nursing care ongoing and safety maintained.

## 2019-06-08 NOTE — PATIENT PROFILE ADULT - NSPROCHRONICPAIN_GEN_A_NUR
After Visit Summary   11/14/2017    Thien Zarate    MRN: [id number]           Patient Information     Date Of Birth          1961        Visit Information        Provider Department      11/14/2017 3:20 PM Frankie Kimbrough MD [facility]        Today's Diagnoses     Hypertension goal BP (blood pressure) < 140/90    -  1      Care Instructions    We are going to change your metoprolol to coreg    Take 1.5 tablets for three days then   Take 1 tablet for three days then   Take 0.5 tab for three days then stop   The day after the last pill start coreg 3.125 mg two times  a day           Follow-ups after your visit        Who to contact     If you have questions or need follow up information about today's clinic visit or your schedule please contact [facility] directly at [phone].  Normal or non-critical lab and imaging results will be communicated to you by [employer], letter or phone within 4 business days after the clinic has received the results. If you do not hear from us within 7 days, please contact the clinic through [employer] or phone. If you have a critical or abnormal lab result, we will notify you by phone as soon as possible.  Submit refill requests through [employer] or call your pharmacy and they will forward the refill request to us. Please allow 3 business days for your refill to be completed.          Additional Information About Your Visit        MyChart Information     [employer] gives you secure access to your electronic health record. If you see a primary care provider, you can also send messages to your care team and make appointments. If you have questions, please call your primary care clinic.  If you do not have a primary care provider, please call [phone] and they will assist you.        Care EveryWhere ID     This is your Care EveryWhere ID. This could be used by other organizations to access your [facility]  records  [id number]        Your Vitals Were     Pulse Temperature Pulse Oximetry BMI (Body Mass Index)          76 97.8  F (36.6  C) (Oral) 96% 35.92 kg/m2         Blood Pressure from Last 3 Encounters:   11/14/17 144/87   11/02/17 128/80   09/14/17 (!) 138/100    Weight from Last 3 Encounters:   11/14/17 276 lb (125.2 kg)   11/02/17 273 lb (123.8 kg)   09/14/17 272 lb (123.4 kg)              Today, you had the following     No orders found for display         Today's Medication Changes          These changes are accurate as of: 11/14/17  4:13 PM.  If you have any questions, ask your nurse or doctor.               Start taking these medicines.        Dose/Directions    carvedilol 3.125 MG tablet   Commonly known as:  COREG   Used for:  Hypertension goal BP (blood pressure) < 140/90   Started by:  Frankie Kimbrough MD        Dose:  3.125 mg   Take 1 tablet (3.125 mg) by mouth 2 times daily (with meals)   Quantity:  60 tablet   Refills:  0            Where to get your medicines      These medications [facility] [place] [facility] [address] [address] [place] [address], [facility] [address]     Phone:  [phone]     carvedilol 3.125 MG tablet                Primary Care Provider Office Phone # Fax #    Frankie Kimbrough [phone]       [address] [facility] [address]        Equal Access to Services     [facility] Hospital[facility] AH: Hadii amanda alfaro Somar, waaxda luqadaha, qaybta kaalmada aderia, maximo carrasco. So [facility] [phone].    ATENCIÓN: Si habla español, tiene a gray disposición servicios gratuitos de asistencia lingüística. Llame al [phone].    We comply with applicable federal civil rights laws and [place] laws. We do not discriminate on the basis of race, color, national origin, age, disability, sex, sexual orientation, or gender identity.            Thank you!     Thank you for  choosing [facility]  for your care. Our goal is always to provide you with excellent care. Hearing back from our patients is one way we can continue to improve our services. Please take a few minutes to complete the written survey that you may receive in the mail after your visit with us. Thank you!             Your Updated Medication List - Protect others around you: Learn how to safely use, store and throw away your medicines at www.disposemymeds.org.          This list is accurate as of: 11/14/17  4:13 PM.  Always use your most recent med list.                   Brand Name Dispense Instructions for use Diagnosis    amLODIPine 10 MG tablet    NORVASC    90 tablet    TAKE 1 TABLET(10 MG) BY MOUTH DAILY    Essential hypertension with goal blood pressure less than 140/90       carvedilol 3.125 MG tablet    COREG    60 tablet    Take 1 tablet (3.125 mg) by mouth 2 times daily (with meals)    Hypertension goal BP (blood pressure) < 140/90       FISH OIL PO      Take 1,000 mg by mouth 3 TABLETS DAILY        indomethacin 25 MG capsule    INDOCIN    30 capsule    TAKE 2 CAPSULE BY MOUTH TWICE DAILY AS NEEDED WITH MEALS    Idiopathic chronic gout of right foot without tophus       lisinopril-hydrochlorothiazide 20-12.5 MG per tablet    PRINZIDE/ZESTORETIC    180 tablet    TAKE 2 TABLETS BY MOUTH DAILY    Essential hypertension with goal blood pressure less than 140/90       metoprolol 100 MG 24 hr tablet    TOPROL-XL    30 tablet    Take 1 tablet (100 mg) by mouth 2 times daily    Essential hypertension with goal blood pressure less than 140/90       potassium chloride SA 20 MEQ CR tablet    KLOR-CON    30 tablet    Take 1 tablet (20 mEq) by mouth daily    Hypokalemia       TYLENOL 8 HOUR PO      Take  by mouth. As needed           yes

## 2019-06-08 NOTE — ED PROVIDER NOTE - ABDOMINAL TENDER
left upper quadrant/left lower quadrant/right lower quadrant/right upper quadrant/periumbilical/umbilical/epigastric/suprapubic

## 2019-06-08 NOTE — ED ADULT TRIAGE NOTE - CHIEF COMPLAINT QUOTE
pt from home, called ems for coffee ground emesis and dark tarry stools, pt pale pt from home, called ems for coffee ground emesis and dark tarry stools, pt pale, # 20 placed by ems and ns bolus infusing

## 2019-06-08 NOTE — CONSULT NOTE ADULT - SUBJECTIVE AND OBJECTIVE BOX
[facility]         Dahiana Bañuelos, Evens, Toney, Terrie, Filiberto Laughlin        [phone] (office)    CHIEF COMPLAINT: Patient is a 84y old  Female who presents with a chief complaint of     HPI:  84f PAF on coumadin, HFPEF (most recent ef normal on a limited study, performed 4/2018).  She has a history of a CVA and hx of a clipped cerebral aneurysm.  She has a history of HTN, HLD, mitral valve prolapse, PAD s/p bypass/stents, redo bypass, and a degree of residual non revascularizable disease, with associated sxs.  She does not clearly have a hx of cad.  She had nuclear stress testing in 2014.  She was found to have an inferolateral defect ultimately deemed artifact, though the description could conceivably have represented ischemia.    She presents to the er brought by her daughter.  She has been having some combination of coffee grounds emesis, possibly hematemesis, and melanotic stool since last night.  Though she initially felt better, she began to deteriorate at home and was brought to the er.  She was found to be pale/ashen, ill appearing, tachycardic.  EKG was ischemic appearing.  She has been found to be severely anemic, with evidence of reduced end organ perfusion with an elevated lactate.  Cardiology consultation is now being obtained.    Denies chest discomfort and shortness of breath.  No definite abdominal pain at this time, though she has had some.  No new neurologic symptoms. She reports pain in her right leg.    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  Osteoarthritis  Afib  PVD (peripheral vascular disease)  Neuropathy: (Right lower leg)  H/O cerebral aneurysm repair: brain clips  CVA (cerebral vascular accident): (&quot;Mini-stroke&quot;,1990&#x27;s)  Rheumatoid arthritis  Hyperlipidemia  Hypertension  S/P cataract surgery: (Left eye)  Elective surgery: (&quot;Twisted bowel&quot;, 2014)  Elective surgery: (Exision of cyst on liver, 1985)  S/P ORIF (open reduction internal fixation) fracture: Left hip fx (2012) &amp; R hip fx (2013)  H/O cerebral aneurysm repair: Brain clips (1978(  Renal stone: Cysto stent placement 10/1/2014  PVD (peripheral vascular disease): s/p RLE bypass x 3, most recent 3/2012 Right external iliac to PT bypass w/ PTFE (2012)  S/P FRED (total abdominal hysterectomy): (1987, Hx of &quot;ovarian cancer?&quot;)      SOCIAL HISTORY: No active tobacco, alcohol or illicit drug use    FAMILY HISTORY:  No pertinent family history in first degree relatives   No pertinent family history of CAD    Outpatient medications:  Aspirin 81 MG TABS; TAKE 1 TABLET DAILY  Atorvastatin Calcium 40 MG Oral Tablet; TAKE ONE TABLET DAILY (FOR  CHOLESTEROL)  D3-1000 CAPS  dilTIAZem HCl ER Coated Beads 120 MG Oral Capsule Extended Release 24 Hour;  TAKE 1 CAPSULE BY MOUTH EVERY DAY  Gabapentin 100 MG Oral Capsule; TAKE 2 CAPSULES 3 TIMES DAILY  hydrALAZINE HCl - 50 MG Oral Tablet; ONE TABLET BY MOUTH THREE TIMES A DAY  Metoprolol Succinate  MG Oral Tablet Extended Release 24 Hour; TAKE 1 TABLET  DAILY  Warfarin Sodium 5 MG Oral Tablet; TAKE AS DIRECTED    MEDICATIONS  (STANDING):  pantoprazole Infusion 8 mG/Hr (10 mL/Hr) IV Continuous <Continuous>  phytonadione  IVPB 10 milliGRAM(s) IV Intermittent once  prothrombin complex concentrate IVPB (KCENTRA) 1500 International Unit(s) IV Intermittent once  sodium chloride 0.9% Bolus 1000 milliLiter(s) IV Bolus once    MEDICATIONS  (PRN):      Allergies    No Known Allergies    Intolerances        REVIEW OF SYSTEMS: Is negative for eye, ENT, GI, , allergic, dermatologic, musculoskeletal and neurologic, except as described above.    VITAL SIGNS:   Vital Signs Last 24 Hrs  T(C): 36.6 (08 Jun 2019 11:28), Max: 37.7 (08 Jun 2019 10:40)  T(F): 97.9 (08 Jun 2019 11:28), Max: 99.9 (08 Jun 2019 10:40)  HR: 97 (08 Jun 2019 11:28) (92 - 100)  BP: 119/57 (08 Jun 2019 11:28) (119/57 - 138/78)  BP(mean): --  RR: 21 (08 Jun 2019 11:28) (16 - 21)  SpO2: 100% (08 Jun 2019 11:28) (99% - 100%)    I&O's Summary      PHYSICAL EXAM:    Constitutional: NAD, awake though lethargic, well-developed. ashen  Eyes:  EOMI, no oral cyanosis, conjunctivae clear, anicteric.  Pulmonary: Non-labored, breath sounds are clear bilaterally, no wheezing, rales or rhonchi  Cardiovascular:  regular S1 and S2. tachy, 1/6 sys murmur.  No rubs, gallops or clicks  Gastrointestinal: Bowel Sounds audible, soft   Lymph: No peripheral edema.   Neurological: leth arousable  Skin: No obvious lesions/rashes.   Psych:  leth    LABS: All Labs Reviewed:                        4.9    14.48 )-----------( 179      ( 08 Jun 2019 10:52 )             16.0     08 Jun 2019 10:52    147    |  115    |  90     ----------------------------<  161    3.6     |  21     |  1.20     Ca    8.0        08 Jun 2019 10:52  Mg     2.2       08 Jun 2019 10:52    TPro  x      /  Alb  3.2    /  TBili  x      /  DBili  x      /  AST  18     /  ALT  21     /  AlkPhos  x      08 Jun 2019 10:52    PT/INR - ( 08 Jun 2019 10:52 )   PT: 33.5 sec;   INR: 2.85 ratio         PTT - ( 08 Jun 2019 10:52 )  PTT:32.3 sec      Blood Culture:         RADIOLOGY:    EKG: sr, stt abn consider ischemia (stt new compared with recent ekg march 2018 [facility]         Dahiana Bañuelos, Evens, Toney, Terrie, Filiberto Laughlin        [phone] (office)    CHIEF COMPLAINT: Patient is a 84y old  Female who presents with a chief complaint of     HPI:  84f PAF on coumadin, HFPEF (most recent ef normal on a limited study, performed 4/2018).  She has a history of a CVA and hx of a clipped cerebral aneurysm.  She has a history of HTN, HLD, mitral valve prolapse, PAD s/p bypass/stents, redo bypass, and a degree of residual non revascularizable disease, with associated sxs.  She does not clearly have a hx of cad.  She had nuclear stress testing in 2014.  She was found to have an inferolateral defect ultimately deemed artifact, though the description could conceivably have represented ischemia.    She presents to the er brought by her daughter.  She has been having some combination of coffee grounds emesis, possibly hematemesis, and melanotic stool since last night.  Though she initially felt better, she began to deteriorate at home and was brought to the er.  She was found to be pale/ashen, ill appearing, tachycardic.  EKG was ischemic appearing.  She has been found to be severely anemic, with evidence of reduced end organ perfusion with an elevated lactate.  Cardiology consultation is now being obtained.    Denies chest discomfort and shortness of breath.  She admits to poorly described abdominal pain.  No new neurologic symptoms. She reports pain in her right leg.    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  Osteoarthritis  Afib  PVD (peripheral vascular disease)  Neuropathy: (Right lower leg)  H/O cerebral aneurysm repair: brain clips  CVA (cerebral vascular accident): (&quot;Mini-stroke&quot;,1990&#x27;s)  Rheumatoid arthritis  Hyperlipidemia  Hypertension  S/P cataract surgery: (Left eye)  Elective surgery: (&quot;Twisted bowel&quot;, 2014)  Elective surgery: (Exision of cyst on liver, 1985)  S/P ORIF (open reduction internal fixation) fracture: Left hip fx (2012) &amp; R hip fx (2013)  H/O cerebral aneurysm repair: Brain clips (1978(  Renal stone: Cysto stent placement 10/1/2014  PVD (peripheral vascular disease): s/p RLE bypass x 3, most recent 3/2012 Right external iliac to PT bypass w/ PTFE (2012)  S/P FRED (total abdominal hysterectomy): (1987, Hx of &quot;ovarian cancer?&quot;)      SOCIAL HISTORY: No active tobacco, alcohol or illicit drug use    FAMILY HISTORY:  No pertinent family history in first degree relatives   No pertinent family history of CAD    Outpatient medications:  Aspirin 81 MG TABS; TAKE 1 TABLET DAILY  Atorvastatin Calcium 40 MG Oral Tablet; TAKE ONE TABLET DAILY (FOR  CHOLESTEROL)  D3-1000 CAPS  dilTIAZem HCl ER Coated Beads 120 MG Oral Capsule Extended Release 24 Hour;  TAKE 1 CAPSULE BY MOUTH EVERY DAY  Gabapentin 100 MG Oral Capsule; TAKE 2 CAPSULES 3 TIMES DAILY  hydrALAZINE HCl - 50 MG Oral Tablet; ONE TABLET BY MOUTH THREE TIMES A DAY  Metoprolol Succinate  MG Oral Tablet Extended Release 24 Hour; TAKE 1 TABLET  DAILY  Warfarin Sodium 5 MG Oral Tablet; TAKE AS DIRECTED    MEDICATIONS  (STANDING):  pantoprazole Infusion 8 mG/Hr (10 mL/Hr) IV Continuous <Continuous>  phytonadione  IVPB 10 milliGRAM(s) IV Intermittent once  prothrombin complex concentrate IVPB (KCENTRA) 1500 International Unit(s) IV Intermittent once  sodium chloride 0.9% Bolus 1000 milliLiter(s) IV Bolus once    MEDICATIONS  (PRN):      Allergies    No Known Allergies    Intolerances        REVIEW OF SYSTEMS: Is negative for eye, ENT, GI, , allergic, dermatologic, musculoskeletal and neurologic, except as described above.    VITAL SIGNS:   Vital Signs Last 24 Hrs  T(C): 36.6 (08 Jun 2019 11:28), Max: 37.7 (08 Jun 2019 10:40)  T(F): 97.9 (08 Jun 2019 11:28), Max: 99.9 (08 Jun 2019 10:40)  HR: 97 (08 Jun 2019 11:28) (92 - 100)  BP: 119/57 (08 Jun 2019 11:28) (119/57 - 138/78)  BP(mean): --  RR: 21 (08 Jun 2019 11:28) (16 - 21)  SpO2: 100% (08 Jun 2019 11:28) (99% - 100%)    I&O's Summary      PHYSICAL EXAM:    Constitutional: NAD, awake though lethargic, well-developed. ashen  Eyes:  EOMI, no oral cyanosis, conjunctivae clear, anicteric.  Pulmonary: Non-labored, breath sounds are clear bilaterally, no wheezing, rales or rhonchi  Cardiovascular:  regular S1 and S2. tachy, 1/6 sys murmur.  No rubs, gallops or clicks  Gastrointestinal: Bowel Sounds audible, diffusely tender	  Lymph: No peripheral edema.   Neurological: leth arousable  Skin: No obvious lesions/rashes.   Psych:  leth    LABS: All Labs Reviewed:                        4.9    14.48 )-----------( 179      ( 08 Jun 2019 10:52 )             16.0     08 Jun 2019 10:52    147    |  115    |  90     ----------------------------<  161    3.6     |  21     |  1.20     Ca    8.0        08 Jun 2019 10:52  Mg     2.2       08 Jun 2019 10:52    TPro  x      /  Alb  3.2    /  TBili  x      /  DBili  x      /  AST  18     /  ALT  21     /  AlkPhos  x      08 Jun 2019 10:52    PT/INR - ( 08 Jun 2019 10:52 )   PT: 33.5 sec;   INR: 2.85 ratio         PTT - ( 08 Jun 2019 10:52 )  PTT:32.3 sec      Blood Culture:         RADIOLOGY:    EKG: sr, stt abn consider ischemia (stt new compared with recent ekg march 2018

## 2019-06-08 NOTE — ED PROVIDER NOTE - ATTENDING CONTRIBUTION TO CARE
Pt is a 85 yo female who presents to the ED with a cc of AMS.  PMHx of Afib on coumadin, h/o CVA, h/o cerebral aneurysm repair, HLD, HTN, MVP, neuropathy, osteomyelitis, OA, PVD, RA.  Pt reports that yesterday she developed severe epigastric pain with N/V/D.  Pt reports that the vomitus is coffee ground in nature and her stool is black.  Pt does have a known AAA and she has a prior history of gastric perforation which was treated non operatively.  She further reports that the abdominal pain has worsened and she further reports fatigue.  Denies fever, CP, SOB, ext numbness or weakness.  On exam pt lying in bed pale and in moderate to severe pain distress.  NCAT, dry MMM, hear sinus tachycardia noted with murmur, lungs CTA diminished at the bases, abd soft with diffuse TTP and guarding noted.  +BS noted.  High suspicion for GIB.  Will obtain screening labs, coags, type and screen.  Will likely have to reverse INR, and transfuse.  Will obtain CTA chest/abd/pelvis when pt is stable, consult ICU, GI, and cardiology.

## 2019-06-09 DIAGNOSIS — I21.4 NON-ST ELEVATION (NSTEMI) MYOCARDIAL INFARCTION: ICD-10-CM

## 2019-06-09 LAB
-  COAGULASE NEGATIVE STAPHYLOCOCCUS: SIGNIFICANT CHANGE UP
ALBUMIN SERPL ELPH-MCNC: 2.9 G/DL — LOW (ref 3.3–5)
ALP SERPL-CCNC: 51 U/L — SIGNIFICANT CHANGE UP (ref 40–120)
ALT FLD-CCNC: 29 U/L — SIGNIFICANT CHANGE UP (ref 12–78)
ANION GAP SERPL CALC-SCNC: 10 MMOL/L — SIGNIFICANT CHANGE UP (ref 5–17)
AST SERPL-CCNC: 112 U/L — HIGH (ref 15–37)
BILIRUB SERPL-MCNC: 1.2 MG/DL — SIGNIFICANT CHANGE UP (ref 0.2–1.2)
BUN SERPL-MCNC: 66 MG/DL — HIGH (ref 7–23)
CALCIUM SERPL-MCNC: 8 MG/DL — LOW (ref 8.5–10.1)
CHLORIDE SERPL-SCNC: 118 MMOL/L — HIGH (ref 96–108)
CK MB BLD-MCNC: 12.5 % — HIGH (ref 0–3.5)
CK MB CFR SERPL CALC: 65.1 NG/ML — HIGH (ref 0–3.6)
CK SERPL-CCNC: 519 U/L — HIGH (ref 26–192)
CO2 SERPL-SCNC: 21 MMOL/L — LOW (ref 22–31)
CREAT SERPL-MCNC: 1.3 MG/DL — SIGNIFICANT CHANGE UP (ref 0.5–1.3)
GLUCOSE SERPL-MCNC: 125 MG/DL — HIGH (ref 70–99)
GRAM STN FLD: SIGNIFICANT CHANGE UP
HCT VFR BLD CALC: 28.8 % — LOW (ref 34.5–45)
HCT VFR BLD CALC: 29.8 % — LOW (ref 34.5–45)
HGB BLD-MCNC: 10 G/DL — LOW (ref 11.5–15.5)
HGB BLD-MCNC: 9.5 G/DL — LOW (ref 11.5–15.5)
INR BLD: 1 RATIO — SIGNIFICANT CHANGE UP (ref 0.88–1.16)
LACTATE SERPL-SCNC: 1.6 MMOL/L — SIGNIFICANT CHANGE UP (ref 0.7–2)
MAGNESIUM SERPL-MCNC: 2.3 MG/DL — SIGNIFICANT CHANGE UP (ref 1.6–2.6)
MCHC RBC-ENTMCNC: 29.2 PG — SIGNIFICANT CHANGE UP (ref 27–34)
MCHC RBC-ENTMCNC: 29.3 PG — SIGNIFICANT CHANGE UP (ref 27–34)
MCHC RBC-ENTMCNC: 33 GM/DL — SIGNIFICANT CHANGE UP (ref 32–36)
MCHC RBC-ENTMCNC: 33.6 GM/DL — SIGNIFICANT CHANGE UP (ref 32–36)
MCV RBC AUTO: 86.9 FL — SIGNIFICANT CHANGE UP (ref 80–100)
MCV RBC AUTO: 88.9 FL — SIGNIFICANT CHANGE UP (ref 80–100)
METHOD TYPE: SIGNIFICANT CHANGE UP
NRBC # BLD: 0 /100 WBCS — SIGNIFICANT CHANGE UP (ref 0–0)
NRBC # BLD: 0 /100 WBCS — SIGNIFICANT CHANGE UP (ref 0–0)
PHOSPHATE SERPL-MCNC: 4 MG/DL — SIGNIFICANT CHANGE UP (ref 2.5–4.5)
PLATELET # BLD AUTO: 144 K/UL — LOW (ref 150–400)
PLATELET # BLD AUTO: 146 K/UL — LOW (ref 150–400)
POTASSIUM SERPL-MCNC: 4.3 MMOL/L — SIGNIFICANT CHANGE UP (ref 3.5–5.3)
POTASSIUM SERPL-SCNC: 4.3 MMOL/L — SIGNIFICANT CHANGE UP (ref 3.5–5.3)
PROT SERPL-MCNC: 5.9 G/DL — LOW (ref 6–8.3)
PROTHROM AB SERPL-ACNC: 11.3 SEC — SIGNIFICANT CHANGE UP (ref 10–12.9)
RBC # BLD: 3.24 M/UL — LOW (ref 3.8–5.2)
RBC # BLD: 3.43 M/UL — LOW (ref 3.8–5.2)
RBC # FLD: 16.1 % — HIGH (ref 10.3–14.5)
RBC # FLD: 17.1 % — HIGH (ref 10.3–14.5)
SODIUM SERPL-SCNC: 149 MMOL/L — HIGH (ref 135–145)
SPECIMEN SOURCE: SIGNIFICANT CHANGE UP
TROPONIN I SERPL-MCNC: 22.1 NG/ML — HIGH (ref 0.01–0.04)
WBC # BLD: 17.09 K/UL — HIGH (ref 3.8–10.5)
WBC # BLD: 17.52 K/UL — HIGH (ref 3.8–10.5)
WBC # FLD AUTO: 17.09 K/UL — HIGH (ref 3.8–10.5)
WBC # FLD AUTO: 17.52 K/UL — HIGH (ref 3.8–10.5)

## 2019-06-09 PROCEDURE — 99233 SBSQ HOSP IP/OBS HIGH 50: CPT

## 2019-06-09 PROCEDURE — 99291 CRITICAL CARE FIRST HOUR: CPT

## 2019-06-09 RX ORDER — HYDROMORPHONE HYDROCHLORIDE 2 MG/ML
1 INJECTION INTRAMUSCULAR; INTRAVENOUS; SUBCUTANEOUS EVERY 4 HOURS
Refills: 0 | Status: DISCONTINUED | OUTPATIENT
Start: 2019-06-09 | End: 2019-06-09

## 2019-06-09 RX ORDER — ACETAMINOPHEN 500 MG
650 TABLET ORAL EVERY 6 HOURS
Refills: 0 | Status: DISCONTINUED | OUTPATIENT
Start: 2019-06-09 | End: 2019-06-17

## 2019-06-09 RX ORDER — METOPROLOL TARTRATE 50 MG
100 TABLET ORAL DAILY
Refills: 0 | Status: DISCONTINUED | OUTPATIENT
Start: 2019-06-09 | End: 2019-06-10

## 2019-06-09 RX ORDER — HYDROMORPHONE HYDROCHLORIDE 2 MG/ML
0.5 INJECTION INTRAMUSCULAR; INTRAVENOUS; SUBCUTANEOUS ONCE
Refills: 0 | Status: DISCONTINUED | OUTPATIENT
Start: 2019-06-09 | End: 2019-06-09

## 2019-06-09 RX ORDER — OXYCODONE HYDROCHLORIDE 5 MG/1
5 TABLET ORAL EVERY 4 HOURS
Refills: 0 | Status: DISCONTINUED | OUTPATIENT
Start: 2019-06-09 | End: 2019-06-13

## 2019-06-09 RX ORDER — DILTIAZEM HCL 120 MG
120 CAPSULE, EXT RELEASE 24 HR ORAL DAILY
Refills: 0 | Status: DISCONTINUED | OUTPATIENT
Start: 2019-06-09 | End: 2019-06-17

## 2019-06-09 RX ORDER — HYDROMORPHONE HYDROCHLORIDE 2 MG/ML
1 INJECTION INTRAMUSCULAR; INTRAVENOUS; SUBCUTANEOUS EVERY 4 HOURS
Refills: 0 | Status: DISCONTINUED | OUTPATIENT
Start: 2019-06-09 | End: 2019-06-13

## 2019-06-09 RX ADMIN — HYDROMORPHONE HYDROCHLORIDE 0.5 MILLIGRAM(S): 2 INJECTION INTRAMUSCULAR; INTRAVENOUS; SUBCUTANEOUS at 07:15

## 2019-06-09 RX ADMIN — HYDROMORPHONE HYDROCHLORIDE 1 MILLIGRAM(S): 2 INJECTION INTRAMUSCULAR; INTRAVENOUS; SUBCUTANEOUS at 14:51

## 2019-06-09 RX ADMIN — HYDROMORPHONE HYDROCHLORIDE 0.5 MILLIGRAM(S): 2 INJECTION INTRAMUSCULAR; INTRAVENOUS; SUBCUTANEOUS at 04:05

## 2019-06-09 RX ADMIN — Medication 650 MILLIGRAM(S): at 23:13

## 2019-06-09 RX ADMIN — HYDROMORPHONE HYDROCHLORIDE 1 MILLIGRAM(S): 2 INJECTION INTRAMUSCULAR; INTRAVENOUS; SUBCUTANEOUS at 09:57

## 2019-06-09 RX ADMIN — HYDROMORPHONE HYDROCHLORIDE 1 MILLIGRAM(S): 2 INJECTION INTRAMUSCULAR; INTRAVENOUS; SUBCUTANEOUS at 15:21

## 2019-06-09 RX ADMIN — HYDROMORPHONE HYDROCHLORIDE 0.5 MILLIGRAM(S): 2 INJECTION INTRAMUSCULAR; INTRAVENOUS; SUBCUTANEOUS at 00:05

## 2019-06-09 RX ADMIN — HYDROMORPHONE HYDROCHLORIDE 0.5 MILLIGRAM(S): 2 INJECTION INTRAMUSCULAR; INTRAVENOUS; SUBCUTANEOUS at 00:35

## 2019-06-09 RX ADMIN — PANTOPRAZOLE SODIUM 10 MG/HR: 20 TABLET, DELAYED RELEASE ORAL at 18:10

## 2019-06-09 RX ADMIN — PANTOPRAZOLE SODIUM 10 MG/HR: 20 TABLET, DELAYED RELEASE ORAL at 06:31

## 2019-06-09 RX ADMIN — CHLORHEXIDINE GLUCONATE 1 APPLICATION(S): 213 SOLUTION TOPICAL at 05:07

## 2019-06-09 RX ADMIN — PANTOPRAZOLE SODIUM 10 MG/HR: 20 TABLET, DELAYED RELEASE ORAL at 21:08

## 2019-06-09 RX ADMIN — HYDROMORPHONE HYDROCHLORIDE 0.5 MILLIGRAM(S): 2 INJECTION INTRAMUSCULAR; INTRAVENOUS; SUBCUTANEOUS at 04:35

## 2019-06-09 RX ADMIN — HYDROMORPHONE HYDROCHLORIDE 1 MILLIGRAM(S): 2 INJECTION INTRAMUSCULAR; INTRAVENOUS; SUBCUTANEOUS at 10:27

## 2019-06-09 RX ADMIN — Medication 120 MILLIGRAM(S): at 05:07

## 2019-06-09 RX ADMIN — Medication 650 MILLIGRAM(S): at 12:52

## 2019-06-09 RX ADMIN — Medication 650 MILLIGRAM(S): at 18:10

## 2019-06-09 RX ADMIN — HYDROMORPHONE HYDROCHLORIDE 0.5 MILLIGRAM(S): 2 INJECTION INTRAMUSCULAR; INTRAVENOUS; SUBCUTANEOUS at 06:45

## 2019-06-09 RX ADMIN — Medication 650 MILLIGRAM(S): at 13:22

## 2019-06-09 RX ADMIN — Medication 100 MILLIGRAM(S): at 05:07

## 2019-06-09 NOTE — PROGRESS NOTE ADULT - SUBJECTIVE AND OBJECTIVE BOX
[facility]  Yang Figueroa PA-C  [address] [place] [address]  [phone]      INTERVAL HPI/OVERNIGHT EVENTS:    no melena  daughters at bedside  troponin noted to be elevated    MEDICATIONS  (STANDING):  acetaminophen   Tablet .. 650 milliGRAM(s) Oral every 6 hours  chlorhexidine 2% Cloths 1 Application(s) Topical <User Schedule>  diltiazem    milliGRAM(s) Oral daily  metoprolol succinate  milliGRAM(s) Oral daily  pantoprazole Infusion 8 mG/Hr (10 mL/Hr) IV Continuous <Continuous>    MEDICATIONS  (PRN):  HYDROmorphone  Injectable 1 milliGRAM(s) IV Push every 4 hours PRN Severe Pain (7 - 10)  oxyCODONE    IR 5 milliGRAM(s) Oral every 4 hours PRN Moderate Pain (4 - 6)      Allergies    No Known Allergies    Intolerances        ROS:   General:  No wt loss, fevers, chills, night sweats, fatigue,   Eyes:  Good vision, no reported pain  ENT:  No sore throat, pain, runny nose, dysphagia  CV:  No pain, palpitations, hypo/hypertension  Resp:  No dyspnea, cough, tachypnea, wheezing  GI:  No pain, No nausea, No vomiting, No diarrhea, No constipation, No weight loss, No fever, No pruritis, No rectal bleeding, No tarry stools, No dysphagia,  :  No pain, bleeding, incontinence, nocturia  Muscle:  No pain, weakness  Neuro:  No weakness, tingling, memory problems  Psych:  No fatigue, insomnia, mood problems, depression  Endocrine:  No polyuria, polydipsia, cold/heat intolerance  Heme:  No petechiae, ecchymosis, easy bruisability  Skin:  No rash, tattoos, scars, edema      PHYSICAL EXAM:   Vital Signs:  Vital Signs Last 24 Hrs  T(C): 36.4 (2019 11:47), Max: 37.4 (2019 20:35)  T(F): 97.5 (2019 11:47), Max: 99.3 (2019 20:35)  HR: 80 (2019 13:00) (80 - 117)  BP: 146/88 (2019 13:00) (124/80 - 171/87)  BP(mean): 113 (2019 13:00) (96 - 126)  RR: 11 (2019 13:00) (9 - 24)  SpO2: 97% (2019 13:00) (94% - 100%)  Daily Height in cm: 154.94 (2019 16:35)    Daily Weight in k.8 (2019 11:54)    GENERAL:  Appears stated age, well-groomed, well-nourished, no distress  HEENT:  NC/AT,  conjunctivae clear and pink, no thyromegaly, nodules, adenopathy, no JVD, sclera -anicteric  CHEST:  Full & symmetric excursion, no increased effort, breath sounds clear  HEART:  Regular rhythm, S1, S2, no murmur/rub/S3/S4, no abdominal bruit, no edema  ABDOMEN:  Soft, non-tender, non-distended, normoactive bowel sounds,  no masses ,no hepato-splenomegaly, no signs of chronic liver disease  EXTEREMITIES:  no cyanosis,clubbing or edema  SKIN:  No rash/erythema/ecchymoses/petechiae/wounds/abscess/warm/dry  NEURO:  Alert, oriented, no asterixis, no tremor, no encephalopathy      LABS:                        10.0   17.52 )-----------( 146      ( 2019 00:22 )             29.8     06-09    149<H>  |  118<H>  |  66<H>  ----------------------------<  125<H>  4.3   |  21<L>  |  1.30    Ca    8.0<L>      2019 08:45  Phos  4.0       Mg     2.3         TPro  5.9<L>  /  Alb  2.9<L>  /  TBili  1.2  /  DBili  x   /  AST  112<H>  /  ALT  29  /  AlkPhos  51  06-09    PT/INR - ( 2019 08:45 )   PT: 11.3 sec;   INR: 1.00 ratio         PTT - ( 2019 19:29 )  PTT:23.4 sec      RADIOLOGY & ADDITIONAL TESTS:

## 2019-06-09 NOTE — PROGRESS NOTE ADULT - SUBJECTIVE AND OBJECTIVE BOX
Interval events:     Review of Systems:  Constitutional: no fever, chills, fatigue  Neuro: no headache, numbness, weakness  Resp: no cough, wheezing, shortness of breath  CVS: no chest pain, palpitations, leg swelling  GI: no abdominal pain, nausea, vomiting, diarrhea   : no dysuria, frequency, incontinence  Skin: no itching, burning, rashes, or lesions   Msk: no joint pain or swelling  Psych: no depression, anxiety    T(F): 97.5 (06-09-19 @ 11:47), Max: 99.3 (06-08-19 @ 20:35)  HR: 86 (06-09-19 @ 12:00) (82 - 117)  BP: 149/83 (06-09-19 @ 12:00) (124/80 - 171/87)  RR: 12 (06-09-19 @ 12:00) (9 - 24)  SpO2: 96% (06-09-19 @ 12:00) (94% - 100%)  Wt(kg): --        CAPILLARY BLOOD GLUCOSE        I&O's Summary    08 Jun 2019 07:01  -  09 Jun 2019 07:00  --------------------------------------------------------  IN: 524 mL / OUT: 0 mL / NET: 524 mL    09 Jun 2019 07:01  -  09 Jun 2019 12:24  --------------------------------------------------------  IN: 40 mL / OUT: 0 mL / NET: 40 mL        Physical Exam:     Gen: NAD; AAOx3; in diffuse pain  Neuro: CN II-XII grossly intact; moving all extremities   HEENT: NC/AT; EOMI; MMM  CV: normal S1 & S2; regular rate and rhythm   Pulm: inspiratory rales at the bases bilaterally improve with coughing  GI: soft; NT/ND  Ext: no edema; pulses intact  Skin: warm, well perfused    Meds:      diltiazem    milliGRAM(s) Oral daily  metoprolol succinate  milliGRAM(s) Oral daily        HYDROmorphone  Injectable 1 milliGRAM(s) IV Push every 4 hours    pantoprazole Infusion 8 mG/Hr IV Continuous <Continuous>            chlorhexidine 2% Cloths 1 Application(s) Topical <User Schedule>                                  10.0   17.52 )-----------( 146      ( 09 Jun 2019 00:22 )             29.8       06-09    149<H>  |  118<H>  |  66<H>  ----------------------------<  125<H>  4.3   |  21<L>  |  1.30    Ca    8.0<L>      09 Jun 2019 08:45  Phos  4.0     06-09  Mg     2.3     06-09    TPro  5.9<L>  /  Alb  2.9<L>  /  TBili  1.2  /  DBili  x   /  AST  112<H>  /  ALT  29  /  AlkPhos  51  06-09    Lactate 3.4           06-08 @ 19:29      CARDIAC MARKERS ( 09 Jun 2019 08:45 )  25.600 ng/mL / x     / 643 U/L / x     / 95.1 ng/mL      PT/INR - ( 09 Jun 2019 08:45 )   PT: 11.3 sec;   INR: 1.00 ratio         PTT - ( 08 Jun 2019 19:29 )  PTT:23.4 sec    .Blood Blood-Peripheral   Growth in aerobic bottle: Gram Positive Cocci in Clusters  "Due to technical problems, Proteus sp. will Not be reported as part of  the BCID panel until further notice"  ***Blood Panel PCR results on this specimen are available  approximately 3 hours after the Gram stain result.***  Gram stain, PCR, and/or culture results may not always  correspond due to difference in methodologies.  ************************************************************  This PCR assay was performed using [facility].  The following targets are tested for: Enterococcus,  vancomycin resistant enterococci, Listeria monocytogenes,  coagulase negative staphylococci, S. aureus,  methicillin resistant S. aureus, Streptococcus agalactiae  (Group B), S. pneumoniae, S.pyogenes (Group A),  Acinetobacter baumannii, Enterobacter cloacae, E. coli,  Klebsiella oxytoca, K. pneumoniae, Proteus sp.,  Serratia marcescens, Haemophilus influenzae,  Neisseria meningitidis, Pseudomonas aeruginosa, Candida  albicans, C. glabrata, C krusei, C parapsilosis,  C. tropicalis and the KPC resistance gene.   Growth in aerobic bottle: Gram Positive Cocci in Clusters 06-08 @ 16:36            Radiology: ***    Bedside Lung U/S: ***    Bedside Cardiac U/S: ***    CENTRAL LINE: Y/N   DATE INSERTED:   REMOVE: Y/N    ISABEL: Y/N      DATE INSERTED:        REMOVE: Y/N    A-LINE: Y/N     DATE INSERTED:              REMOVE: Y/N    GLOBAL ISSUE/BEST PRACTICE:  Analgesia:  Sedation:  CAM-ICU:  HOB elevation: yes  Stress ulcer prophylaxis:  VTE prophylaxis:  Glycemic control:  Nutrition:    CODE STATUS: *** Interval events: s/p 3 units of blood, vitamin K and K-centra    Review of Systems:  Constitutional: no fever, chills, fatigue  Neuro: no headache, numbness, weakness  Resp: no cough, wheezing, shortness of breath  CVS: no chest pain, palpitations, leg swelling  GI: no abdominal pain, nausea, vomiting, diarrhea   : no dysuria, frequency, incontinence  Skin: no itching, burning, rashes, or lesions   Msk: no joint pain or swelling  Psych: no depression, anxiety    T(F): 97.5 (06-09-19 @ 11:47), Max: 99.3 (06-08-19 @ 20:35)  HR: 86 (06-09-19 @ 12:00) (82 - 117)  BP: 149/83 (06-09-19 @ 12:00) (124/80 - 171/87)  RR: 12 (06-09-19 @ 12:00) (9 - 24)  SpO2: 96% (06-09-19 @ 12:00) (94% - 100%)  Wt(kg): --        CAPILLARY BLOOD GLUCOSE        I&O's Summary    08 Jun 2019 07:01  -  09 Jun 2019 07:00  --------------------------------------------------------  IN: 524 mL / OUT: 0 mL / NET: 524 mL    09 Jun 2019 07:01  -  09 Jun 2019 12:24  --------------------------------------------------------  IN: 40 mL / OUT: 0 mL / NET: 40 mL        Physical Exam:     Gen: NAD; AAOx3; in diffuse pain  Neuro: CN II-XII grossly intact; moving all extremities   HEENT: NC/AT; EOMI; MMM  CV: normal S1 & S2; regular rate and rhythm   Pulm: inspiratory rales at the bases bilaterally improve with coughing  GI: soft; NT/ND  Ext: no edema; pulses intact  Skin: warm, well perfused    Meds:      diltiazem    milliGRAM(s) Oral daily  metoprolol succinate  milliGRAM(s) Oral daily        HYDROmorphone  Injectable 1 milliGRAM(s) IV Push every 4 hours    pantoprazole Infusion 8 mG/Hr IV Continuous <Continuous>            chlorhexidine 2% Cloths 1 Application(s) Topical <User Schedule>                                  10.0   17.52 )-----------( 146      ( 09 Jun 2019 00:22 )             29.8       06-09    149<H>  |  118<H>  |  66<H>  ----------------------------<  125<H>  4.3   |  21<L>  |  1.30    Ca    8.0<L>      09 Jun 2019 08:45  Phos  4.0     06-09  Mg     2.3     06-09    TPro  5.9<L>  /  Alb  2.9<L>  /  TBili  1.2  /  DBili  x   /  AST  112<H>  /  ALT  29  /  AlkPhos  51  06-09    Lactate 3.4           06-08 @ 19:29      CARDIAC MARKERS ( 09 Jun 2019 08:45 )  25.600 ng/mL / x     / 643 U/L / x     / 95.1 ng/mL      PT/INR - ( 09 Jun 2019 08:45 )   PT: 11.3 sec;   INR: 1.00 ratio         PTT - ( 08 Jun 2019 19:29 )  PTT:23.4 sec    .Blood Blood-Peripheral   Growth in aerobic bottle: Gram Positive Cocci in Clusters  "Due to technical problems, Proteus sp. will Not be reported as part of  the BCID panel until further notice"  ***Blood Panel PCR results on this specimen are available  approximately 3 hours after the Gram stain result.***  Gram stain, PCR, and/or culture results may not always  correspond due to difference in methodologies.  ************************************************************  This PCR assay was performed using [employer].  The following targets are tested for: Enterococcus,  vancomycin resistant enterococci, Listeria monocytogenes,  coagulase negative staphylococci, S. aureus,  methicillin resistant S. aureus, Streptococcus agalactiae  (Group B), S. pneumoniae, S.pyogenes (Group A),  Acinetobacter baumannii, Enterobacter cloacae, E. coli,  Klebsiella oxytoca, K. pneumoniae, Proteus sp.,  Serratia marcescens, Haemophilus influenzae,  Neisseria meningitidis, Pseudomonas aeruginosa, Candida  albicans, C. glabrata, C krusei, C parapsilosis,  C. tropicalis and the KPC resistance gene.   Growth in aerobic bottle: Gram Positive Cocci in Clusters 06-08 @ 16:36            Radiology: ***    Bedside Lung U/S: ***    Bedside Cardiac U/S: ***    CENTRAL LINE: Y/N   DATE INSERTED:   REMOVE: Y/N    ISABEL: Y/N      DATE INSERTED:        REMOVE: Y/N    A-LINE: Y/N     DATE INSERTED:              REMOVE: Y/N    GLOBAL ISSUE/BEST PRACTICE:  Analgesia:  Sedation:  CAM-ICU:  HOB elevation: yes  Stress ulcer prophylaxis:  VTE prophylaxis:  Glycemic control:  Nutrition:    CODE STATUS: *** Interval events: s/p 3 units of blood, vitamin K and K-centra. No further bleeding. +diffuse myalgias and pain, not localized    Review of Systems:  Constitutional: no fever, chills, fatigue  Neuro: no headache, numbness, weakness  Resp: no cough, wheezing, shortness of breath  CVS: no chest pain, palpitations, leg swelling  GI: no abdominal pain, nausea, vomiting, diarrhea   : no dysuria, frequency, incontinence  Skin: no itching, burning, rashes, or lesions   Msk: diffuse myalgias and body aches  Psych: no depression, anxiety    T(F): 97.5 (06-09-19 @ 11:47), Max: 99.3 (06-08-19 @ 20:35)  HR: 86 (06-09-19 @ 12:00) (82 - 117)  BP: 149/83 (06-09-19 @ 12:00) (124/80 - 171/87)  RR: 12 (06-09-19 @ 12:00) (9 - 24)  SpO2: 96% (06-09-19 @ 12:00) (94% - 100%)    I&O's Summary    08 Jun 2019 07:01  -  09 Jun 2019 07:00  --------------------------------------------------------  IN: 524 mL / OUT: 0 mL / NET: 524 mL    Physical Exam:     Gen: NAD; AAOx3; in diffuse pain  Neuro: CN II-XII grossly intact; moving all extremities   HEENT: NC/AT; EOMI; MMM  CV: normal S1 & S2; regular rate and rhythm   Pulm: inspiratory rales at the bases bilaterally improve with coughing  GI: soft; NT/ND  Ext: no edema; pulses intact  Skin: warm, well perfused    Meds:  diltiazem    milliGRAM(s) Oral daily  metoprolol succinate  milliGRAM(s) Oral daily  HYDROmorphone  Injectable 1 milliGRAM(s) IV Push every 4 hours  pantoprazole Infusion 8 mG/Hr IV Continuous <Continuous>  chlorhexidine 2% Cloths 1 Application(s) Topical <User Schedule>                          10.0   17.52 )-----------( 146      ( 09 Jun 2019 00:22 )             29.8       06-09    149<H>  |  118<H>  |  66<H>  ----------------------------<  125<H>  4.3   |  21<L>  |  1.30    Ca    8.0<L>      09 Jun 2019 08:45  Phos  4.0     06-09  Mg     2.3     06-09    TPro  5.9<L>  /  Alb  2.9<L>  /  TBili  1.2  /  DBili  x   /  AST  112<H>  /  ALT  29  /  AlkPhos  51  06-09    Lactate 3.4           06-08 @ 19:29      CARDIAC MARKERS ( 09 Jun 2019 08:45 )  25.600 ng/mL / x     / 643 U/L / x     / 95.1 ng/mL      PT/INR - ( 09 Jun 2019 08:45 )   PT: 11.3 sec;   INR: 1.00 ratio         PTT - ( 08 Jun 2019 19:29 )  PTT:23.4 sec    .Blood Blood-Peripheral   Growth in aerobic bottle: Gram Positive Cocci in Clusters  "Due to technical problems, Proteus sp. will Not be reported as part of  the BCID panel until further notice"  ***Blood Panel PCR results on this specimen are available  approximately 3 hours after the Gram stain result.***  Gram stain, PCR, and/or culture results may not always  correspond due to difference in methodologies.  ************************************************************  This PCR assay was performed using [employer].  The following targets are tested for: Enterococcus,  vancomycin resistant enterococci, Listeria monocytogenes,  coagulase negative staphylococci, S. aureus,  methicillin resistant S. aureus, Streptococcus agalactiae  (Group B), S. pneumoniae, S.pyogenes (Group A),  Acinetobacter baumannii, Enterobacter cloacae, E. coli,  Klebsiella oxytoca, K. pneumoniae, Proteus sp.,  Serratia marcescens, Haemophilus influenzae,  Neisseria meningitidis, Pseudomonas aeruginosa, Candida  albicans, C. glabrata, C krusei, C parapsilosis,  C. tropicalis and the KPC resistance gene.   Growth in aerobic bottle: Gram Positive Cocci in Clusters 06-08 @ 16:36    CENTRAL LINE: N    ISABEL: N    A-LINE: N    GLOBAL ISSUE/BEST PRACTICE:  Analgesia: acetaminophen, oxycodone, hydromorphone  Sedation: n/a  CAM-ICU: N  HOB elevation: yes  Stress ulcer prophylaxis: PPI gtt  VTE prophylaxis: SCD's  Glycemic control: yes  Nutrition: clears today, NPO after midnight    CODE STATUS: full

## 2019-06-09 NOTE — PROGRESS NOTE ADULT - PROBLEM SELECTOR PLAN 3
hold BP meds for now hold coumadin  monitor electrolytes closely K>4 Mg >2  Consider metoprolol IVP if BP permits  management as per ICU and cardiology

## 2019-06-09 NOTE — DIETITIAN INITIAL EVALUATION ADULT. - ETIOLOGY
related to age-related changes in appetite and po intake in setting of multiple medical comorbidities related to alteration in GI tract structure/function

## 2019-06-09 NOTE — PROGRESS NOTE ADULT - ASSESSMENT
84F PMH HTN, HLD, A-Fib on warfarin, CVA without deficits, CVA s/p clipping, PVD s/p bypass/stents, and PUD presents with acute blood loss anemia due to upper GI bleed with severe abdominal pain, melena, and coffee-ground emesis, s/p 3 units prbc's, Vitamin K 10 mg IV, and Kcentra 1500 units IV. Now with no further bleeding, stable H/H, and improved BUN. Found to have significant demand ischemia with elevated CE's and ST depressions in inferolateral leads.    - Pain control with acetaminophen 650 mg q6h ATC, oxycodone 5 mg q4h prn, and hydromorphone 1 mg IV q4h prn  - HD stable, continue rate control with metoprolol and diltiazem. Currently in NSR. Hold a/c given GI bleed  - Type II NSTEMI due to demand ischemia, trend cardiac enzymes, hold a/c and antiplts given bleeding. Episode of NSVT due to demand ischemia, keep K>4 and Mg>2. Trend lactate  - Stable respiratory status, on room air with SpO2>90%  - Ok to give clears today, keep NPO after midnight for EGD. Continue PPI gtt. Trend H/H  - Mild BRIDGET, trend kidney function and lytes, keep K>4, Mg>2  - Observe off abx. Blood cultures with coag-negative staph which is a contaminant, repeat cultures pending  - SCD's for DVT ppx, trend H/H  - Discussed with patient at bedside. If CE's downtrending and no further ventricular ectopy, likely to go for EGD in AM  - Stable for transfer to tele

## 2019-06-09 NOTE — PROGRESS NOTE ADULT - PROBLEM SELECTOR PLAN 1
Continue PPI drip  GI Dr Medellin consulted EGD once stable  Tranfusion to hbg >8  Hold coumadin, s/p reversal

## 2019-06-09 NOTE — PROGRESS NOTE ADULT - PROBLEM SELECTOR PLAN 5
hold aspirin, await GI recs   continue bedtime atorvastatin monitor for now  caution with IVF/ tranfusion

## 2019-06-09 NOTE — PROGRESS NOTE ADULT - SUBJECTIVE AND OBJECTIVE BOX
Patient is a 84y old  Female who presents with a chief complaint of  GIB     INTERVAL HPI/OVERNIGHT EVENTS: 83 yo female w/ PMH of afib on coumadin, CVA and hx of aneurysm repair, HTN, HLD, mitral valve prolapse, PVD s/p stents x 3 (RLE), rheumatoid arthritis, cataracts comes with black stools and severe acute blood loss anemia secondary to Upper GIB . Seen and examined at bedside. Feels weak. No chest pain, sob       MEDICATIONS  (STANDING):  chlorhexidine 2% Cloths 1 Application(s) Topical <User Schedule>  diltiazem    milliGRAM(s) Oral daily  HYDROmorphone  Injectable 1 milliGRAM(s) IV Push every 4 hours  metoprolol succinate  milliGRAM(s) Oral daily  pantoprazole Infusion 8 mG/Hr (10 mL/Hr) IV Continuous <Continuous>    MEDICATIONS  (PRN):      Allergies    No Known Allergies    Intolerances        REVIEW OF SYSTEMS:  CONSTITUTIONAL: No fever, + Generalized weakness   EYES: No eye pain, visual disturbances, or discharge  ENMT:  No difficulty hearing, tinnitus, vertigo; No sinus or throat pain  NECK: No pain or stiffness  RESPIRATORY: No cough, wheezing, chills or hemoptysis; No shortness of breath  CARDIOVASCULAR: No chest pain, palpitations, dizziness, or leg swelling  GASTROINTESTINAL: No abdominal or epigastric pain. No nausea, vomiting  GENITOURINARY: No dysuria, frequency, hematuria, or incontinence  NEUROLOGICAL: No headaches, loss of strength, numbness, or tremors  LYMPH NODES: No enlarged glands  ENDOCRINE: No heat or cold intolerance; No hair loss; No polydipsia or polyuria  MUSCULOSKELETAL: No joint pain or swelling; No muscle, back, or extremity pain  HEME/LYMPH: No easy bruising, or bleeding gums    Vital Signs Last 24 Hrs  T(C): 36.7 (09 Jun 2019 07:43), Max: 37.7 (08 Jun 2019 10:40)  T(F): 98 (09 Jun 2019 07:43), Max: 99.9 (08 Jun 2019 10:40)  HR: 104 (09 Jun 2019 09:00) (92 - 117)  BP: 147/95 (09 Jun 2019 09:00) (108/74 - 171/87)  BP(mean): 116 (09 Jun 2019 09:00) (96 - 126)  RR: 16 (09 Jun 2019 09:00) (9 - 24)  SpO2: 100% (09 Jun 2019 09:00) (94% - 100%)    PHYSICAL EXAM:  GENERAL: Frail, pale   HEAD:  Atraumatic, Normocephalic  EYES: EOMI, PERRLA, conjunctiva and sclera clear  ENMT: No tonsillar erythema, exudates, or enlargement; Moist mucous membranes  NECK: Supple, No JVD, Normal thyroid  NERVOUS SYSTEM:  Alert & Awake, pale, ill looking   CHEST/LUNG: Clear to auscultation bilaterally; No rales, rhonchi, wheezing, or rubs  HEART: S1S2+,  Regular rate and rhythm  ABDOMEN: Soft,  Nondistended; Bowel sounds present  EXTREMITIES:  2+ Peripheral Pulses, No clubbing, cyanosis    LABS:                        10.0   17.52 )-----------( 146      ( 09 Jun 2019 00:22 )             29.8     09 Jun 2019 08:45    149    |  118    |  66     ----------------------------<  125    4.3     |  21     |  1.30     Ca    8.0        09 Jun 2019 08:45  Phos  4.0       09 Jun 2019 08:45  Mg     2.3       09 Jun 2019 08:45    TPro  5.9    /  Alb  2.9    /  TBili  1.2    /  DBili  x      /  AST  112    /  ALT  29     /  AlkPhos  51     09 Jun 2019 08:45    PT/INR - ( 09 Jun 2019 08:45 )   PT: 11.3 sec;   INR: 1.00 ratio         PTT - ( 08 Jun 2019 19:29 )  PTT:23.4 sec  CAPILLARY BLOOD GLUCOSE        BLOOD CULTURE  06-08 @ 16:36   Growth in aerobic bottle: Gram Positive Cocci in Clusters  "Due to technical problems, Proteus sp. will Not be reported as part of  the BCID panel until further notice"  ***Blood Panel PCR results on this specimen are available  approximately 3 hours after the Gram stain result.***  Gram stain, PCR, and/or culture results may not always  correspond due to difference in methodologies.  ************************************************************  This PCR assay was performed using [facility].  The following targets are tested for: Enterococcus,  vancomycin resistant enterococci, Listeria monocytogenes,  coagulase negative staphylococci, S. aureus,  methicillin resistant S. aureus, Streptococcus agalactiae  (Group B), S. pneumoniae, S.pyogenes (Group A),  Acinetobacter baumannii, Enterobacter cloacae, E. coli,  Klebsiella oxytoca, K. pneumoniae, Proteus sp.,  Serratia marcescens, Haemophilus influenzae,  Neisseria meningitidis, Pseudomonas aeruginosa, Candida  albicans, C. glabrata, C krusei, C parapsilosis,  C. tropicalis and the KPC resistance gene.  --  --    RADIOLOGY & ADDITIONAL TESTS:    Imaging Personally Reviewed:  [ ] YES     Consultant(s) Notes Reviewed:      Care Discussed with Consultants/Other Providers:

## 2019-06-09 NOTE — PROGRESS NOTE ADULT - ASSESSMENT
84f PAF on coumadin, HFPEF (most recent ef normal on a limited study, performed 4/2018).  She has a history of a CVA and hx of a clipped cerebral aneurysm.  She has a history of HTN, HLD, mitral valve prolapse, PAD s/p bypass/stents, redo bypass, and a degree of residual non revascularizable disease, with associated sxs.  She does not clearly have a hx of cad.  She had nuclear stress testing in 2014.  She was found to have an inferolateral defect ultimately deemed artifact, though the description could conceivably have represented ischemia.    She presents to the er brought by her daughter.  She has been having some combination of coffee grounds emesis, possibly hematemesis, and melanotic stool since last night.  Though she initially felt better, she began to deteriorate at home and was brought to the er.  She was found to be pale/ashen, ill appearing, tachycardic.  EKG was ischemic appearing.  She has been found to be severely anemic, with evidence of reduced end organ perfusion with an elevated lactate.  Cardiology consultation is now being obtained.    Denies chest discomfort and shortness of breath.  No definite abdominal pain at this time, though she has had some.  No new neurologic symptoms. She reports pain in her right leg.    -there was evidence of ischemia on ekg  -ce were not done until today, and as expected they are quite elevated, cont to cycle to asses trend  -ischemia is on the basis of demand from severe anemia and not acs  -volume and blood are appropriate to treat the ischemia at this time, and it remains inappropriate to add antiplatelet therapy at this time    -long 38 beat run of wct noted overnight, in the setting of her demand-mediated nstemi  -is otherwise in sr with a h/o paf  -very likely to develop af, and would monitor for this  -therapeutic inr, was reversed with kcentra and vit k  -takes dilt 120 and toprol 100 daily, and these should be continued     -no volume overload  -most recent ef felt normal  -would not place artifical limits on volume administration over hf concerns    -trend lactate    -is tentatively planned for egd tomorrow  -will defer providing an opinion on the appropriateness of egd timing for now. would prefer that her enzymes be clearly downtrending and that she be free of meaningful ventricular ectopy, unless the endoscopy is urgent, which at present it is not    The patient is at risk of abrupt decompensation.  I have personally provided  35 minutes of critical care time, excluding time spent on separate procedures.

## 2019-06-09 NOTE — DIETITIAN INITIAL EVALUATION ADULT. - OTHER INFO
Pt seen for nutrition assessment in ICU. Per chart, pt is 83 Y/O F with PMH of Afib (coumadin - aware of food drug interaction with Vitamin K rich foods), CVA, aneurysm, HTN, HLD, mitral valve prolapse admitted for black stools (GI bleed). Per pt, poor appetite noted prior to admission. Weight loss appears gradual (pt well known to this service and weight trending down x past 4 years however has been stable 108-115 pounds since February 2018. NPO thus far during this admission. Tentative EGD. Denied current N/V/diarrhea/constipation, last BM yesterday (dark per pt). Wears dentures uppers/lowers which are present with her, h/o CVA and denies difficulties chewing/swallowing. NKFA.

## 2019-06-09 NOTE — DIETITIAN INITIAL EVALUATION ADULT. - NS AS NUTRI INTERV MEDICAL AND FOOD SUPPLEMENTS
Add Ensure Clear TID while on clears for additional kcal/protein. Once diet advanced, recommend Ensure Enlive BID. Pt amenable to adding supplement.

## 2019-06-09 NOTE — PROGRESS NOTE ADULT - SUBJECTIVE AND OBJECTIVE BOX
[facility]    Dahiana Bañuelos, Evens, Toney, Terrie, Truman, Filiberto      [phone]    CHIEF COMPLAINT: Patient is a 84y old  Female who presents with a chief complaint of GIB (09 Jun 2019 10:16)      Follow Up: severe gib    Interim history: reports diffuse pain, ?musculoskeletal. denies cp or sob.  some abd discomfort    MEDICATIONS  (STANDING):  chlorhexidine 2% Cloths 1 Application(s) Topical <User Schedule>  diltiazem    milliGRAM(s) Oral daily  HYDROmorphone  Injectable 1 milliGRAM(s) IV Push every 4 hours  metoprolol succinate  milliGRAM(s) Oral daily  pantoprazole Infusion 8 mG/Hr (10 mL/Hr) IV Continuous <Continuous>    MEDICATIONS  (PRN):      REVIEW OF SYSTEMS:  eye, ent, GI, , allergic, dermatologic, musculoskeletal and neurologic are negative except as described above    Vital Signs Last 24 Hrs  T(C): 36.7 (09 Jun 2019 07:43), Max: 37.4 (08 Jun 2019 20:35)  T(F): 98 (09 Jun 2019 07:43), Max: 99.3 (08 Jun 2019 20:35)  HR: 87 (09 Jun 2019 10:00) (87 - 117)  BP: 153/86 (09 Jun 2019 10:00) (108/74 - 171/87)  BP(mean): 112 (09 Jun 2019 10:00) (96 - 126)  RR: 16 (09 Jun 2019 10:00) (9 - 24)  SpO2: 98% (09 Jun 2019 10:00) (94% - 100%)    I&O's Summary    08 Jun 2019 07:01  -  09 Jun 2019 07:00  --------------------------------------------------------  IN: 524 mL / OUT: 0 mL / NET: 524 mL    09 Jun 2019 07:01  -  09 Jun 2019 11:03  --------------------------------------------------------  IN: 40 mL / OUT: 0 mL / NET: 40 mL        Telemetry past 24h: st, 38 beats wct    PHYSICAL EXAM:    Constitutional: well-nourished, well-developed, mildl distress from pain  HEENT:  MMM, sclerae anicteric, conjunctivae clear, no oral cyanosis.  Pulmonary: Non-labored, breath sounds are clear bilaterally, No wheezing, rales or rhonchi  Cardiovascular: Regular, S1 and S2.  No murmur.  No rubs, gallops or clicks  Gastrointestinal: Bowel Sounds present, mildly diffusely tender.   Lymph: No peripheral edema.   Neurological: Alert, no focal deficits  Skin: No rashes.  Psych:  Mood & affect appropriate    LABS: All Labs Reviewed:                        10.0   17.52 )-----------( 146      ( 09 Jun 2019 00:22 )             29.8                         10.8   15.42 )-----------( 149      ( 08 Jun 2019 17:54 )             32.9                         4.9    14.48 )-----------( 179      ( 08 Jun 2019 10:52 )             16.0     09 Jun 2019 08:45    149    |  118    |  66     ----------------------------<  125    4.3     |  21     |  1.30   08 Jun 2019 10:52    147    |  115    |  90     ----------------------------<  161    3.6     |  21     |  1.20     Ca    8.0        09 Jun 2019 08:45  Ca    8.0        08 Jun 2019 10:52  Phos  4.0       09 Jun 2019 08:45  Mg     2.3       09 Jun 2019 08:45  Mg     2.0       08 Jun 2019 19:29  Mg     2.2       08 Jun 2019 10:52    TPro  5.9    /  Alb  2.9    /  TBili  1.2    /  DBili  x      /  AST  112    /  ALT  29     /  AlkPhos  51     09 Jun 2019 08:45  TPro  6.0    /  Alb  3.2    /  TBili  0.3    /  DBili  x      /  AST  18     /  ALT  21     /  AlkPhos  49     08 Jun 2019 10:52    PT/INR - ( 09 Jun 2019 08:45 )   PT: 11.3 sec;   INR: 1.00 ratio         PTT - ( 08 Jun 2019 19:29 )  PTT:23.4 sec  CARDIAC MARKERS ( 09 Jun 2019 08:45 )  25.600 ng/mL / x     / 643 U/L / x     / 95.1 ng/mL      Blood Culture: Organism --  Gram Stain Blood -- Gram Stain   Growth in aerobic bottle: Gram Positive Cocci in Clusters  Specimen Source .Blood Blood-Peripheral  Culture-Blood --            RADIOLOGY:    EKG:    Echo:

## 2019-06-09 NOTE — DIETITIAN INITIAL EVALUATION ADULT. - ORAL INTAKE PTA
Pt reports decreased appetite/intake PTA 2/2 age related changes in taste. States some days her appetite is "lousy" and other days she tries to eat 2-3 meals per day. Did not provide diet recall at this time. Takes Multivitamin PTA.

## 2019-06-09 NOTE — DIETITIAN INITIAL EVALUATION ADULT. - PROBLEM SELECTOR PLAN 1
PPI adrián  GI Dr Medellin consulted EGD once stable  Tranfusion to hbg >8  Hold coumadin, s/p reversal

## 2019-06-09 NOTE — PROGRESS NOTE ADULT - PROBLEM SELECTOR PLAN 2
hold coumadin  monitor electrolytes closely K>4 Mg >2  Consider metoprolol IVP if BP permits  management as per ICU and cardiology Likely secondary to acute demand ischemia in setting of severe anemia  Not a candidate for antiplatelet agents secondary to GIB  TTE  Statin  Cardio following

## 2019-06-09 NOTE — CHART NOTE - NSCHARTNOTEFT_GEN_A_CORE
Upon Nutritional Assessment by the Registered Dietitian your patient was determined to meet criteria / has evidence of the following diagnosis/diagnoses:          [ ]  Mild Protein Calorie Malnutrition        [X]  Moderate Protein Calorie Malnutrition        [ ] Severe Protein Calorie Malnutrition        [ ] Unspecified Protein Calorie Malnutrition        [ ] Underweight / BMI <19        [ ] Morbid Obesity / BMI > 40      Findings as based on:  [X] Comprehensive nutrition assessment   [X] Nutrition Focused Physical Exam: BMI 20.7 kg/m2 using pt's stated wt of 110 pounds; Nutrition focused physical exam conducted - found signs of malnutrition [ ]absent [X]present   Subcutaneous fat loss: [X] Orbital fat pads region (severe with dark circles), [X]Buccal fat region (moderate), [X]Triceps region (moderate), Muscle wasting: [X]Temples region (severe), [X]Clavicle region (moderate), [X]Shoulder region (moderate), [X]Scapula region (moderate), [X] Interosseous region (moderate)  [X] Other: Malnutrition (moderate, chronic) related to age-related changes in appetite and po intake in setting of multiple medical comorbidities as evidenced by as evidenced by <75% of nutrition needs >1 month, moderate-severe signs of muscle/fat loss, +1 edema.      Nutrition Plan/Recommendations:      1) Advance diet to clear liquids as medically feasible. Add Ensure Clear TID while on clears for additional kcal/protein.  2) Pending tolerance, further advance diet to low fiber, low sodium. Encourage po intake of nutrient dense meals/snacks. Add Ensure Enlive BID for additional kcal/protein.  3) Monitor po intake, weights, BMs, skin integrity, tolerance of diet consistency, and nutrition related labs.     PROVIDER Section:     By signing this assessment you are acknowledging and agree with the diagnosis/diagnoses assigned by the Registered Dietitian    Alessandra Polk RD

## 2019-06-09 NOTE — DIETITIAN INITIAL EVALUATION ADULT. - SIGNS/SYMPTOMS
as evidenced by <75% of nutrition needs >1 month, moderate-severe signs of muscle/fat loss, +1 edema as evidenced by pt admitted with tarry stools, GI bleed

## 2019-06-09 NOTE — PROGRESS NOTE ADULT - ASSESSMENT
83 yo female w/ PMH of afib on coumadin, CVA and hx of aneurysm repair, HTN, HLD, mitral valve prolapse, PVD s/p stents x 3 (RLE), rheumatoid arthritis, cataracts comes with black stools and severe anemia, upper GIB

## 2019-06-09 NOTE — PROGRESS NOTE ADULT - PROBLEM SELECTOR PLAN 1
npo  monitor cbc  monitor stool for gi bleeding  tentative  upper gastrointestinal endoscopy on monday pending clincal course if ok with cardiology

## 2019-06-09 NOTE — DIETITIAN INITIAL EVALUATION ADULT. - PHYSICAL APPEARANCE
underweight/BMI 20.7 kg/m2 using pt's stated wt of 110 pounds BMI 20.7 kg/m2 using pt's stated wt of 110 pounds; Nutrition focused physical exam conducted - found signs of malnutrition [ ]absent [X]present   Subcutaneous fat loss: [X] Orbital fat pads region (severe with dark circles), [X]Buccal fat region (moderate), [X]Triceps region (moderate), Muscle wasting: [X]Temples region (severe), [X]Clavicle region (moderate), [X]Shoulder region (moderate), [X]Scapula region (moderate), [X] Interosseous region (moderate)/underweight

## 2019-06-09 NOTE — DIETITIAN INITIAL EVALUATION ADULT. - NS AS NUTRI INTERV MEALS SNACK
Advance diet as medically feasible. Monitor po intake, weights, BMs, skin integrity, tolerance of diet consistency, and nutrition related labs.

## 2019-06-10 DIAGNOSIS — D72.829 ELEVATED WHITE BLOOD CELL COUNT, UNSPECIFIED: ICD-10-CM

## 2019-06-10 DIAGNOSIS — R94.5 ABNORMAL RESULTS OF LIVER FUNCTION STUDIES: ICD-10-CM

## 2019-06-10 LAB
ALBUMIN SERPL ELPH-MCNC: 3.1 G/DL — LOW (ref 3.3–5)
ALP SERPL-CCNC: 57 U/L — SIGNIFICANT CHANGE UP (ref 40–120)
ALT FLD-CCNC: 31 U/L — SIGNIFICANT CHANGE UP (ref 12–78)
ANION GAP SERPL CALC-SCNC: 9 MMOL/L — SIGNIFICANT CHANGE UP (ref 5–17)
AST SERPL-CCNC: 80 U/L — HIGH (ref 15–37)
BILIRUB SERPL-MCNC: 1.5 MG/DL — HIGH (ref 0.2–1.2)
BUN SERPL-MCNC: 55 MG/DL — HIGH (ref 7–23)
CALCIUM SERPL-MCNC: 8.3 MG/DL — LOW (ref 8.5–10.1)
CHLORIDE SERPL-SCNC: 116 MMOL/L — HIGH (ref 96–108)
CO2 SERPL-SCNC: 20 MMOL/L — LOW (ref 22–31)
CREAT SERPL-MCNC: 1.2 MG/DL — SIGNIFICANT CHANGE UP (ref 0.5–1.3)
CULTURE RESULTS: SIGNIFICANT CHANGE UP
GLUCOSE SERPL-MCNC: 136 MG/DL — HIGH (ref 70–99)
HCT VFR BLD CALC: 31 % — LOW (ref 34.5–45)
HGB BLD-MCNC: 10 G/DL — LOW (ref 11.5–15.5)
MAGNESIUM SERPL-MCNC: 2.3 MG/DL — SIGNIFICANT CHANGE UP (ref 1.6–2.6)
MCHC RBC-ENTMCNC: 29.1 PG — SIGNIFICANT CHANGE UP (ref 27–34)
MCHC RBC-ENTMCNC: 32.3 GM/DL — SIGNIFICANT CHANGE UP (ref 32–36)
MCV RBC AUTO: 90.1 FL — SIGNIFICANT CHANGE UP (ref 80–100)
NRBC # BLD: 0 /100 WBCS — SIGNIFICANT CHANGE UP (ref 0–0)
ORGANISM # SPEC MICROSCOPIC CNT: SIGNIFICANT CHANGE UP
ORGANISM # SPEC MICROSCOPIC CNT: SIGNIFICANT CHANGE UP
PHOSPHATE SERPL-MCNC: 3.1 MG/DL — SIGNIFICANT CHANGE UP (ref 2.5–4.5)
PLATELET # BLD AUTO: 142 K/UL — LOW (ref 150–400)
POTASSIUM SERPL-MCNC: 4 MMOL/L — SIGNIFICANT CHANGE UP (ref 3.5–5.3)
POTASSIUM SERPL-SCNC: 4 MMOL/L — SIGNIFICANT CHANGE UP (ref 3.5–5.3)
PROT SERPL-MCNC: 6.2 G/DL — SIGNIFICANT CHANGE UP (ref 6–8.3)
RBC # BLD: 3.44 M/UL — LOW (ref 3.8–5.2)
RBC # FLD: 17.2 % — HIGH (ref 10.3–14.5)
SODIUM SERPL-SCNC: 145 MMOL/L — SIGNIFICANT CHANGE UP (ref 135–145)
SPECIMEN SOURCE: SIGNIFICANT CHANGE UP
WBC # BLD: 17.58 K/UL — HIGH (ref 3.8–10.5)
WBC # FLD AUTO: 17.58 K/UL — HIGH (ref 3.8–10.5)

## 2019-06-10 PROCEDURE — 99233 SBSQ HOSP IP/OBS HIGH 50: CPT

## 2019-06-10 PROCEDURE — 93306 TTE W/DOPPLER COMPLETE: CPT | Mod: 26

## 2019-06-10 RX ORDER — ACETAMINOPHEN 500 MG
1000 TABLET ORAL ONCE
Refills: 0 | Status: COMPLETED | OUTPATIENT
Start: 2019-06-10 | End: 2019-06-10

## 2019-06-10 RX ORDER — ATORVASTATIN CALCIUM 80 MG/1
20 TABLET, FILM COATED ORAL AT BEDTIME
Refills: 0 | Status: DISCONTINUED | OUTPATIENT
Start: 2019-06-10 | End: 2019-06-17

## 2019-06-10 RX ORDER — ALBUTEROL 90 UG/1
2.5 AEROSOL, METERED ORAL EVERY 8 HOURS
Refills: 0 | Status: DISCONTINUED | OUTPATIENT
Start: 2019-06-10 | End: 2019-06-17

## 2019-06-10 RX ORDER — METOPROLOL TARTRATE 50 MG
100 TABLET ORAL
Refills: 0 | Status: DISCONTINUED | OUTPATIENT
Start: 2019-06-10 | End: 2019-06-17

## 2019-06-10 RX ADMIN — Medication 120 MILLIGRAM(S): at 05:04

## 2019-06-10 RX ADMIN — ALBUTEROL 2.5 MILLIGRAM(S): 90 AEROSOL, METERED ORAL at 23:01

## 2019-06-10 RX ADMIN — Medication 650 MILLIGRAM(S): at 00:29

## 2019-06-10 RX ADMIN — Medication 650 MILLIGRAM(S): at 18:14

## 2019-06-10 RX ADMIN — PANTOPRAZOLE SODIUM 10 MG/HR: 20 TABLET, DELAYED RELEASE ORAL at 08:35

## 2019-06-10 RX ADMIN — Medication 650 MILLIGRAM(S): at 05:05

## 2019-06-10 RX ADMIN — HYDROMORPHONE HYDROCHLORIDE 1 MILLIGRAM(S): 2 INJECTION INTRAMUSCULAR; INTRAVENOUS; SUBCUTANEOUS at 20:17

## 2019-06-10 RX ADMIN — Medication 400 MILLIGRAM(S): at 13:58

## 2019-06-10 RX ADMIN — HYDROMORPHONE HYDROCHLORIDE 1 MILLIGRAM(S): 2 INJECTION INTRAMUSCULAR; INTRAVENOUS; SUBCUTANEOUS at 08:35

## 2019-06-10 RX ADMIN — Medication 100 MILLIGRAM(S): at 18:14

## 2019-06-10 RX ADMIN — HYDROMORPHONE HYDROCHLORIDE 1 MILLIGRAM(S): 2 INJECTION INTRAMUSCULAR; INTRAVENOUS; SUBCUTANEOUS at 14:13

## 2019-06-10 RX ADMIN — Medication 650 MILLIGRAM(S): at 19:00

## 2019-06-10 RX ADMIN — CHLORHEXIDINE GLUCONATE 1 APPLICATION(S): 213 SOLUTION TOPICAL at 05:04

## 2019-06-10 RX ADMIN — HYDROMORPHONE HYDROCHLORIDE 1 MILLIGRAM(S): 2 INJECTION INTRAMUSCULAR; INTRAVENOUS; SUBCUTANEOUS at 08:45

## 2019-06-10 RX ADMIN — Medication 100 MILLIGRAM(S): at 05:05

## 2019-06-10 RX ADMIN — Medication 1000 MILLIGRAM(S): at 14:30

## 2019-06-10 RX ADMIN — HYDROMORPHONE HYDROCHLORIDE 1 MILLIGRAM(S): 2 INJECTION INTRAMUSCULAR; INTRAVENOUS; SUBCUTANEOUS at 03:31

## 2019-06-10 RX ADMIN — HYDROMORPHONE HYDROCHLORIDE 1 MILLIGRAM(S): 2 INJECTION INTRAMUSCULAR; INTRAVENOUS; SUBCUTANEOUS at 14:23

## 2019-06-10 RX ADMIN — ATORVASTATIN CALCIUM 20 MILLIGRAM(S): 80 TABLET, FILM COATED ORAL at 21:36

## 2019-06-10 RX ADMIN — ALBUTEROL 2.5 MILLIGRAM(S): 90 AEROSOL, METERED ORAL at 17:54

## 2019-06-10 RX ADMIN — HYDROMORPHONE HYDROCHLORIDE 1 MILLIGRAM(S): 2 INJECTION INTRAMUSCULAR; INTRAVENOUS; SUBCUTANEOUS at 20:45

## 2019-06-10 RX ADMIN — HYDROMORPHONE HYDROCHLORIDE 1 MILLIGRAM(S): 2 INJECTION INTRAMUSCULAR; INTRAVENOUS; SUBCUTANEOUS at 04:29

## 2019-06-10 RX ADMIN — Medication 650 MILLIGRAM(S): at 06:15

## 2019-06-10 NOTE — CONSULT NOTE ADULT - PROBLEM SELECTOR RECOMMENDATION 9
GI bleed - serial Hgb - GI following - PPI - monitor VS and HD  HFpEF and NSTEMI - cardio following, cont cardio recs, cvs regimen, BP control  COPD - Emphysema - NEBS tid - I junior - monitor vs and Sat - keep sat > 88 pct  frail and weak elderly  assist with ADL  oral hygiene  supportive medical regimen and care and monitoring  discussed plan of care with the patient  ct scan and TTE reviewed
reverse coagulopathy  npo  PPI drip  transfuse to hgb of 8  will need EGD once stable   d/w daughter

## 2019-06-10 NOTE — PROGRESS NOTE ADULT - ASSESSMENT
84f PAF on coumadin, HFPEF (most recent ef normal on a limited study, performed 4/2018).  She has a history of a CVA and hx of a clipped cerebral aneurysm.  She has a history of HTN, HLD, mitral valve prolapse, PAD s/p bypass/stents, redo bypass, and a degree of residual non revascularizable disease, with associated sxs.  She does not clearly have a hx of cad.  She had nuclear stress testing in 2014.  She was found to have an inferolateral defect ultimately deemed artifact, though the description could conceivably have represented ischemia.    She presents to the ED brought c/o some combination of coffee grounds emesis, possibly hematemesis, and melanotic stool since last night.  Though she initially felt better, she began to deteriorate at home and was brought to the er.  She was found to be pale/ashen, ill appearing, tachycardic.  EKG was ischemic appearing.  She has been found to be severely anemic, with evidence of reduced end organ perfusion with an elevated lactate.  Cardiology consultation is now being obtained.    Denies chest discomfort and shortness of breath.  No definite abdominal pain at this time, though she has had some.  No new neurologic symptoms. She reports pain in her right leg.    NSTEMI  - There was evidence of ischemia on ekg  - Cardiac enzymes have been elevated with troponins peaking at 25.6  - Ischemia is on the basis of demand from severe anemia   - volume and blood are appropriate to treat the ischemia at this time, and it remains inappropriate to add antiplatelet therapy at this time  - Had long 38 beat run of wct noted prior night but no further, which could have been in the setting of her demand-mediated nstemi  - Unable to start any antiplatelet in setting of profound anemia from GIB  - Continue BB.  Start statin  - Obtain TTE    GIB  - Presented with Hgb of 4.9, s/p PRBC x 3 units.  Now Hgb = 10  - Continue to trend H/H.  Transfuse to keep Hgb>9  - GI following    Afib  - She presented with therapeutic inr, was reversed with kcentra and vit k in the setting of bleeding  - She is currently in NSR  - Continue CCB and BB at same dose  - Hold AC for now    HFpEF  - No volume overload  - TTE in Feb. 2018 showed preserved LVF with no significant valvular diseases.   - Follow up repeat  - Lactate trended down (1.6 <--3.4)    Is tentatively planned for egd tomorrow, however, will defer providing an opinion on the appropriateness of EGD timing for now.  Would prefer that her enzymes be clearly downtrending and that she be free of meaningful ventricular ectopy, unless the endoscopy is urgent, which at present it is not    Isabel Quintero [facility]  Cardiology 84f PAF on coumadin, HFPEF (most recent ef normal on a limited study, performed 4/2018).  She has a history of a CVA and hx of a clipped cerebral aneurysm.  She has a history of HTN, HLD, mitral valve prolapse, PAD s/p bypass/stents, redo bypass, and a degree of residual non revascularizable disease, with associated sxs.  She does not clearly have a hx of cad.  She had nuclear stress testing in 2014.  She was found to have an inferolateral defect ultimately deemed artifact, though the description could conceivably have represented ischemia.    She presents to the ED brought c/o some combination of coffee grounds emesis, possibly hematemesis, and melanotic stool since last night.  Though she initially felt better, she began to deteriorate at home and was brought to the er.  She was found to be pale/ashen, ill appearing, tachycardic.  EKG was ischemic appearing.  She has been found to be severely anemic, with evidence of reduced end organ perfusion with an elevated lactate.        NSTEMI  - There was evidence of ischemia on ekg  - Cardiac enzymes have been elevated with troponins peaking at 25.6  - Ischemia is on the basis of demand from severe anemia   - She continues to have intermittent chest pains  - volume and blood are appropriate to treat the ischemia at this time, and it remains inappropriate to add antiplatelet therapy at this time as she has a GI bleed  - Need to trend H/H and keep hgb higher than 9  - Had long 38 beat run of wct noted prior night but no further, which could have been in the setting of her NSTEMI  - Unable to start any antiplatelet in setting of profound anemia from GIB  - Continue BB.  Increase as tolerated by BP.  I am going to increase Toprol to 100 BID to help control her anginal symptoms.  - Start statin drug  - Obtain TTE    GIB  - Presented with Hgb of 4.9, s/p PRBC x 3 units.  Now Hgb = 10  - Continue to trend H/H.  Transfuse to keep Hgb>9  - GI following    Afib  - She presented with therapeutic inr, was reversed with kcentra and vit k in the setting of bleeding  - She is currently in NSR  - Continue CCB and BB  - Hold AC for now    HFpEF  - No volume overload  - TTE in Feb. 2018 showed preserved LVF with no significant valvular diseases.   - Follow up repeat  - Lactate trended down (1.6 <--3.4)    Is tentatively planned for egd tomorrow, however, will defer.  She is high risk for any procedures given her active and acute MI.  Would prefer that her enzymes be clearly downtrending and that she be free of meaningful ventricular ectopy, unless the endoscopy is urgent, which at present it is not.  She is also having intermittent angina, which I would like better controlled.    Isabel Quintero [facility]  Cardiology

## 2019-06-10 NOTE — PROGRESS NOTE ADULT - ATTENDING COMMENTS
EGD per GI  Cardio f/u  Monitor h/h  Protonix drip Possible plan for EGD some times this wee, once optimized from Cardiac stand point. Possible plan for EGD some times this week , once optimized from Cardiac stand point.

## 2019-06-10 NOTE — PROGRESS NOTE ADULT - PROBLEM SELECTOR PLAN 2
cta a/p neg for acute  hgb stable sp transfusion  no evidence of overt gib per nursing  monitor cbc, transfuse prn  cont ppi, rec carafate bid, anti emetics prn  monitor stool color  cont clears as tolerated  monitor exam, prn pain control  will plan for egd later this week when optimized/appropriate clearances obtained

## 2019-06-10 NOTE — PROGRESS NOTE ADULT - ATTENDING COMMENTS
I personally saw and examined the patient in detail.  I have spoken to the above provider regarding the assessment and plan of care.  I reviewed the above assessment and plan of care, and agree.  I have made changes in the body of the note where appropriate.  Total time spent taking care of patient, reviewing data, and discussing case with medical team and staff in excess of 35 minutes.  She is having an acute MI.  Need to transfuse to hgb greater than 9.  can not use AC or antiplatelets given severe life threatening GIB.  Increase BB to help with anginal control.  Start statin.  Check echo.

## 2019-06-10 NOTE — PROGRESS NOTE ADULT - PROBLEM SELECTOR PLAN 4
hold coumadin  monitor electrolytes closely K>4 Mg >2  Consider metoprolol IVP if BP permits  management as per ICU and cardiology hold coumadin  monitor electrolytes closely K>4 Mg >2  Consider metoprolol IVP if BP permits  Cardiology following

## 2019-06-10 NOTE — CONSULT NOTE ADULT - SUBJECTIVE AND OBJECTIVE BOX
Date/Time Patient Seen:  		  Referring MD:   Data Reviewed	       Patient is a 84y old  Female who presents with a chief complaint of Melena, weakness, GIB (10 Osmani 2019 09:06)      Subjective/HPI    in bed  seen and examined  vs and meds reviewed  labs reviewed    H and P reviewed  ER provider note reviewed    HISTORY OF PRESENT ILLNESS:    High Risk Travel:  International Travel? No(1)     Preferred Language to Address Healthcare:  · Preferred Language to Address Healthcare	English     Child Abuse Assessment (patients less than 13 yrs):  Chief Complaint: rectal bleeding.    · Chief Complaint: The patient is a 84y Female complaining of rectal bleeding.  · HPI Objective Statement: pt is a 85yo female with pmhx of a-fib on coumadin, htn, hld, pvd s/p stents, cva c/o abd pain x today. pt reports dark brown vomit with blood with black stools since yesterday. pt reports she has associated epigastric abd pain. pt with hx of GIB in the past, sees dr moses. pt denies fever, cp.   	GI: [facility]  PMD: craffa  · Presenting Symptoms: DECREASED EATING/DRINKING, DIARRHEA, NAUSEA, PAIN, TENDERNESS, VOMITING  · Negative Findings: no abdominal distension, no burning urination, no fever  · Location: abdomen  · Laterality: generalized  · Area: epigastric  · Radiation: abdomen  · Timing: sudden onset  · Duration: yesterday  · Quality: blood streaked diarrhea  · Severity: SEVERE  · Context: unknown  · Recent Exposure To: none known  · Aggravated Factors: medication  · Relieving Factors: none      85 yo female w/ PMH of afib on coumadin, CVA and hx of aneurysm repair, HTN, HLD, mitral valve prolapse, PVD s/p stents x 3 (RLE), rheumatoid arthritis, cataracts of the L eye comes to the ER with complaints of bloody stool. As per patient since yesterday she started having adbominal pain with "black stool" and coffee ground emesis. She denies any recent chest pain, SOB. Patient came to the ER noted to have Hbg of 4.9. Given Kcentra, Vit K, tranfusion.CTA done no aneurysm leakage. Evaluated by ICU warranting ICU admission.     PAST SURGICAL HISTORY:  Elective surgery (Exision of cyst on liver, 1985)    Elective surgery ("Twisted bowel", 2014)    H/O cerebral aneurysm repair Brain clips (1978(    PVD (peripheral vascular disease) s/p RLE bypass x 3, most recent 3/2012 Right external iliac to PT bypass w/ PTFE (2012)    Renal stone Cysto stent placement 10/1/2014    S/P cataract surgery (Left eye)    S/P ORIF (open reduction internal fixation) fracture Left hip fx (2012) & R hip fx (2013)    S/P FRED (total abdominal hysterectomy) (1987, Hx of "ovarian cancer?").     FAMILY HISTORY:  No pertinent family history in first degree relatives.     No Pertinent Family History in first degree relatives of: na.     Social History:  Social History (marital status, living situation, occupation, tobacco use, alcohol and drug use, and sexual history): denies toxic habits     Tobacco Screening:  · Core Measure Site	Yes  · Has the patient used tobacco in the past 30 days?	No    Risk Assessment:    Present on Admission:  Deep Venous Thrombosis	no  Pulmonary Embolus	no     Heart Failure:  Does this patient have a history of or has been diagnosed with heart failure? yes.     LV Function Assessment (LVS function was evaluated before arrival and/or during hospitalization) yes.     Is the Ejection Fraction >40% ? yes.     normal LV function.     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  Osteoarthritis  Afib  PVD (peripheral vascular disease)  Neuropathy: (Right lower leg)  Gout  H/O cerebral aneurysm repair: brain clips  CVA (cerebral vascular accident): (&quot;Mini-stroke&quot;,1990&#x27;s)  Rheumatoid arthritis  Asthma  Hyperlipidemia  Hypertension  S/P cataract surgery: (Left eye)  Elective surgery: (&quot;Twisted bowel&quot;, 2014)  Elective surgery: (Exision of cyst on liver, 1985)  S/P ORIF (open reduction internal fixation) fracture: Left hip fx (2012) &amp; R hip fx (2013)  H/O cerebral aneurysm repair: Brain clips (1978(  Renal stone: Cysto stent placement 10/1/2014  PVD (peripheral vascular disease): s/p RLE bypass x 3, most recent 3/2012 Right external iliac to PT bypass w/ PTFE (2012)  S/P FRED (total abdominal hysterectomy): (1987, Hx of &quot;ovarian cancer?&quot;)        Medication list         MEDICATIONS  (STANDING):  acetaminophen   Tablet .. 650 milliGRAM(s) Oral every 6 hours  acetaminophen  IVPB .. 1000 milliGRAM(s) IV Intermittent once  atorvastatin 20 milliGRAM(s) Oral at bedtime  chlorhexidine 2% Cloths 1 Application(s) Topical <User Schedule>  diltiazem    milliGRAM(s) Oral daily  metoprolol succinate  milliGRAM(s) Oral two times a day  pantoprazole Infusion 8 mG/Hr (10 mL/Hr) IV Continuous <Continuous>    MEDICATIONS  (PRN):  HYDROmorphone  Injectable 1 milliGRAM(s) IV Push every 4 hours PRN Severe Pain (7 - 10)  oxyCODONE    IR 5 milliGRAM(s) Oral every 4 hours PRN Moderate Pain (4 - 6)         Vitals log        ICU Vital Signs Last 24 Hrs  T(C): 36.9 (10 Osmani 2019 08:44), Max: 36.9 (10 Osmani 2019 08:44)  T(F): 98.4 (10 Osmani 2019 08:44), Max: 98.4 (10 Osmani 2019 08:44)  HR: 88 (10 Osmani 2019 08:44) (78 - 88)  BP: 162/92 (10 Osmani 2019 08:44) (126/80 - 174/95)  BP(mean): 97 (09 Jun 2019 15:00) (97 - 128)  ABP: --  ABP(mean): --  RR: 17 (10 Osmani 2019 08:44) (8 - 18)  SpO2: 90% (10 Osmani 2019 08:44) (90% - 99%)           Input and Output:  I&O's Detail    09 Jun 2019 07:01  -  10 Osmani 2019 07:00  --------------------------------------------------------  IN:    Oral Fluid: 220 mL    pantoprazole Infusion: 240 mL  Total IN: 460 mL    OUT:  Total OUT: 0 mL    Total NET: 460 mL          Lab Data                        10.0   17.58 )-----------( 142      ( 10 Osmani 2019 06:39 )             31.0     06-10    145  |  116<H>  |  55<H>  ----------------------------<  136<H>  4.0   |  20<L>  |  1.20    Ca    8.3<L>      10 Osmani 2019 06:39  Phos  3.1     06-10  Mg     2.3     06-10    TPro  6.2  /  Alb  3.1<L>  /  TBili  1.5<H>  /  DBili  x   /  AST  80<H>  /  ALT  31  /  AlkPhos  57  06-10      CARDIAC MARKERS ( 10 Osmani 2019 06:39 )  11.800 ng/mL / x     / 277 U/L / x     / 26.9 ng/mL  CARDIAC MARKERS ( 09 Jun 2019 15:46 )  22.100 ng/mL / x     / 519 U/L / x     / 65.1 ng/mL  CARDIAC MARKERS ( 09 Jun 2019 08:45 )  25.600 ng/mL / x     / 643 U/L / x     / 95.1 ng/mL    ex smoker        Review of Systems	  abd pain  weak        Objective     Physical Examination    heart s1s2  lung dec BS  abd soft  frail  weak  on o2 support  verbal      Pertinent Lab findings & Imaging      Fransico:  NO   Adequate UO     I&O's Detail    09 Jun 2019 07:01  -  10 Osmani 2019 07:00  --------------------------------------------------------  IN:    Oral Fluid: 220 mL    pantoprazole Infusion: 240 mL  Total IN: 460 mL    OUT:  Total OUT: 0 mL    Total NET: 460 mL               Discussed with:     Cultures:	        Radiology

## 2019-06-10 NOTE — PROGRESS NOTE ADULT - SUBJECTIVE AND OBJECTIVE BOX
INTERVAL HPI/OVERNIGHT EVENTS:  pt seen and examined  c/o back and abd pain  c/o nausea, feels like she may vomit  per rn no s/s overt gib    MEDICATIONS  (STANDING):  acetaminophen   Tablet .. 650 milliGRAM(s) Oral every 6 hours  atorvastatin 20 milliGRAM(s) Oral at bedtime  chlorhexidine 2% Cloths 1 Application(s) Topical <User Schedule>  diltiazem    milliGRAM(s) Oral daily  metoprolol succinate  milliGRAM(s) Oral two times a day  pantoprazole Infusion 8 mG/Hr (10 mL/Hr) IV Continuous <Continuous>    MEDICATIONS  (PRN):  HYDROmorphone  Injectable 1 milliGRAM(s) IV Push every 4 hours PRN Severe Pain (7 - 10)  oxyCODONE    IR 5 milliGRAM(s) Oral every 4 hours PRN Moderate Pain (4 - 6)      Allergies    No Known Allergies    Intolerances        Review of Systems:    General:  No wt loss, fevers, chills, night sweats, fatigue   Eyes:  Good vision, no reported pain  ENT:  No sore throat, pain, runny nose, dysphagia  CV:  No pain, palpitations, hypo/hypertension  Resp:  No dyspnea, cough, tachypnea, wheezing  GI:  +pain, +nausea, No vomiting, No diarrhea, No constipation, No weight loss, No fever, No pruritis, No rectal bleeding, No melena, No dysphagia  :  No pain, bleeding, incontinence, nocturia  Muscle:  No pain, weakness  Neuro:  back pain  Psych:  No fatigue, insomnia, mood problems, depression  Endocrine:  No polyuria, polydypsia, cold/heat intolerance  Heme:  No petechiae, ecchymosis, easy bruisability  Skin:  No rash, tattoos, scars, edema      Vital Signs Last 24 Hrs  T(C): 36.9 (10 Osmani 2019 08:44), Max: 36.9 (10 Osmani 2019 08:44)  T(F): 98.4 (10 Osmani 2019 08:44), Max: 98.4 (10 Osmani 2019 08:44)  HR: 88 (10 Osmani 2019 08:44) (78 - 88)  BP: 162/92 (10 Osmani 2019 08:44) (126/80 - 174/95)  BP(mean): 97 (09 Jun 2019 15:00) (97 - 128)  RR: 17 (10 Osmani 2019 08:44) (8 - 18)  SpO2: 90% (10 Osmani 2019 08:44) (90% - 99%)    PHYSICAL EXAM:    Constitutional: lying in bed uncomfortable appearing  HEENT: ncat  Neck: No LAD  Respiratory:  dec bs  Cardiovascular: S1 and S2, RRR  Gastrointestinal: soft generalized ttp nd  Extremities: No peripheral edema  Vascular: 2+ peripheral pulses  Neurological: Awake alert  Skin: No rashes      LABS:                        10.0   17.58 )-----------( 142      ( 10 Osmani 2019 06:39 )             31.0     06-10    145  |  116<H>  |  55<H>  ----------------------------<  136<H>  4.0   |  20<L>  |  1.20    Ca    8.3<L>      10 Osmani 2019 06:39  Phos  3.1     06-10  Mg     2.3     06-10    TPro  6.2  /  Alb  3.1<L>  /  TBili  1.5<H>  /  DBili  x   /  AST  80<H>  /  ALT  31  /  AlkPhos  57  06-10    PT/INR - ( 09 Jun 2019 08:45 )   PT: 11.3 sec;   INR: 1.00 ratio         PTT - ( 08 Jun 2019 19:29 )  PTT:23.4 sec      RADIOLOGY & ADDITIONAL TESTS:

## 2019-06-10 NOTE — PROGRESS NOTE ADULT - SUBJECTIVE AND OBJECTIVE BOX
[facility] -- Dahiana Bañuelos, Evens, Toney, Truman Falcon Savella  Office # [phone]    Follow Up:  Profound Anemia, NSTEMI    Subjective/Observations: Awake and alert, c/o severe abdominal pain radiating to her chest and back.  Denies SOB, nausea or diaphoresis.    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  Osteoarthritis  Afib  PVD (peripheral vascular disease)  Neuropathy: (Right lower leg)  H/O cerebral aneurysm repair: brain clips  CVA (cerebral vascular accident): (&quot;Mini-stroke&quot;,1990&#x27;s)  Rheumatoid arthritis  Hyperlipidemia  Hypertension  S/P cataract surgery: (Left eye)  Elective surgery: (&quot;Twisted bowel&quot;, 2014)  Elective surgery: (Exision of cyst on liver, 1985)  S/P ORIF (open reduction internal fixation) fracture: Left hip fx (2012) &amp; R hip fx (2013)  H/O cerebral aneurysm repair: Brain clips (1978(  Renal stone: Cysto stent placement 10/1/2014  PVD (peripheral vascular disease): s/p RLE bypass x 3, most recent 3/2012 Right external iliac to PT bypass w/ PTFE (2012)  S/P FRED (total abdominal hysterectomy): (1987, Hx of &quot;ovarian cancer?&quot;)    MEDICATIONS  (STANDING):  acetaminophen   Tablet .. 650 milliGRAM(s) Oral every 6 hours  chlorhexidine 2% Cloths 1 Application(s) Topical <User Schedule>  diltiazem    milliGRAM(s) Oral daily  metoprolol succinate  milliGRAM(s) Oral daily  pantoprazole Infusion 8 mG/Hr (10 mL/Hr) IV Continuous <Continuous>    MEDICATIONS  (PRN):  HYDROmorphone  Injectable 1 milliGRAM(s) IV Push every 4 hours PRN Severe Pain (7 - 10)  oxyCODONE    IR 5 milliGRAM(s) Oral every 4 hours PRN Moderate Pain (4 - 6)    Allergies    No Known Allergies    Intolerances    Vital Signs Last 24 Hrs  T(C): 36.8 (10 Osmani 2019 04:48), Max: 36.8 (10 Osmani 2019 04:48)  T(F): 98.2 (10 Osmani 2019 04:48), Max: 98.2 (10 Osmani 2019 04:48)  HR: 78 (10 Osmani 2019 04:48) (78 - 104)  BP: 138/82 (10 Osmani 2019 04:48) (126/80 - 174/95)  BP(mean): 97 (09 Jun 2019 15:00) (97 - 128)  RR: 18 (10 Osmani 2019 04:48) (8 - 18)  SpO2: 95% (10 Osmani 2019 04:48) (90% - 100%)    I&O's Summary    09 Jun 2019 07:01  -  10 Osmani 2019 07:00  --------------------------------------------------------  IN: 460 mL / OUT: 0 mL / NET: 460 mL    PHYSICAL EXAM:  TELE: NSR  Constitutional: NAD, awake and alert, cachectic  HEENT: Moist Mucous Membranes, Anicteric  Pulmonary: Non-labored, breath sounds are clear bilaterally, No wheezing, rales or rhonchi  Cardiovascular: Regular, S1 and S2, No murmurs, rubs, gallops or clicks  Gastrointestinal: Bowel Sounds present, soft, nontender.   Lymph: No peripheral edema. No lymphadenopathy.  Skin: No visible rashes or ulcers.  Very pale  Psych:  Mood & affect appropriate    LABS: All Labs Reviewed:                        10.0   17.58 )-----------( 142      ( 10 Osmani 2019 06:39 )             31.0                         9.5    17.09 )-----------( 144      ( 09 Jun 2019 15:46 )             28.8                         10.0   17.52 )-----------( 146      ( 09 Jun 2019 00:22 )             29.8     10 Osmani 2019 06:39    145    |  116    |  55     ----------------------------<  136    4.0     |  20     |  1.20   09 Jun 2019 08:45    149    |  118    |  66     ----------------------------<  125    4.3     |  21     |  1.30   08 Jun 2019 10:52    147    |  115    |  90     ----------------------------<  161    3.6     |  21     |  1.20     Ca    8.3        10 Osmani 2019 06:39  Ca    8.0        09 Jun 2019 08:45  Ca    8.0        08 Jun 2019 10:52  Phos  3.1       10 Osmani 2019 06:39  Phos  4.0       09 Jun 2019 08:45  Mg     2.3       10 Osmani 2019 06:39  Mg     2.3       09 Jun 2019 08:45  Mg     2.0       08 Jun 2019 19:29    TPro  6.2    /  Alb  3.1    /  TBili  1.5    /  DBili  x      /  AST  80     /  ALT  31     /  AlkPhos  57     10 Osmani 2019 06:39  TPro  5.9    /  Alb  2.9    /  TBili  1.2    /  DBili  x      /  AST  112    /  ALT  29     /  AlkPhos  51     09 Jun 2019 08:45  TPro  6.0    /  Alb  3.2    /  TBili  0.3    /  DBili  x      /  AST  18     /  ALT  21     /  AlkPhos  49     08 Jun 2019 10:52    PT/INR - ( 09 Jun 2019 08:45 )   PT: 11.3 sec;   INR: 1.00 ratio      PTT - ( 08 Jun 2019 19:29 )  PTT:23.4 sec  CARDIAC MARKERS ( 09 Jun 2019 15:46 )  22.100 ng/mL / x     / 519 U/L / x     / 65.1 ng/mL  CARDIAC MARKERS ( 09 Jun 2019 08:45 )  25.600 ng/mL / x     / 643 U/L / x     / 95.1 ng/mL    < from: CT Angio Chest w/ IV Cont (06.08.19 @ 13:02) >    EXAM:  CT ANGIO CHEST (W)AW IC                          EXAM:  CT ANGIO ABD PELV (W)AW IC                          PROCEDURE DATE:  06/08/2019        INTERPRETATION:  CTA CHEST/ABDOMEN/PELVIS:     CLINICAL INFORMATION: Pain with abdominal aortic aneurysm. Rule out   rupture    COMPARISON: CT abdomen and pelvis of 1/13/2019 and older study of   7/21/2018..    PROCEDURE:  Using multislice helical CT, 2.5 mm sections were obtained   from the thoracic inlet through the ischial tuberosities withthe   administration of intravenous contrast. Oral contrast was not   administered. 95 cc of Omnipaque 350 were administered. MIP sequences are   also performed. Preliminary noncontrast imaging was also performed.    Coronal and sagittal reformationswere performed by  CT technologist and   made available for review.    FINDINGS:  The visualized structures of the lower neck are unremarkable.   The visualized aorta is of normal course and caliber. The heart is not   enlarged. There is no evidence of pericardial effusion. There is   atherosclerotic calcification of the thoracic aorta and its branches.   There is tracheobronchial calcification. There is coronary artery   calcification.    There is no evidence of axillary, hilar, or mediastinal lymphadenopathy.    No focal lung consolidations identified. No evidence of pleural effusion   or pneumothorax. No pulmonary nodules identified. There is extensive   centrilobular emphysema. There is scarring at the lung bases with   probable honeycombing at the right lung base      The liver is of normal contour. there is no evidence of mass. The   gallbladder is remarkable for multiple calcified gallstones No evidence   of intra or extrahepatic biliary dilatation.     The pancreas, spleen, and adrenal glands are grossly unremarkable. There   is no evidence of hydronephrosis or perinephric stranding. No evidence of    nephrolithiasis.The bladder is unremarkable. There is nonobstructive   right lower pole renal calculus measuring 7 mm. There isno   hydronephrosis.    No evidence of lymphadenopathy utilizing CT size criteria. The aorta   demonstrates saccular aneurysm of the mid aorta just below the level of   the renal arteries with a configuration similar to that seen on the   previous study. There is atherosclerotic calcification of the abdominal   aorta and its branches. A stent is seen on the right at the level of the   external iliac artery with graft extending to the groin.    There is no evidence of bowel obstruction. There is no evidence of   pneumoperitoneum or free fluid. There is diverticulosis without   diverticulitis. Surgical clips are seen in the left pelvis. Surgical   sutures are again seen in the right lower quadrant and in the small bowel    The visualized osseous structures demonstrate osteopenia and degenerative   changes of the spine. There is bilateral hip ORIF.    IMPRESSION:  No significant change in the appearance of infrarenal   abdominal aortic aneurysm without evidence of leakage.  Small calcified gallstones are again noted  Nonobstructive right lower pole renal calculus is again seen in  Postsurgical changes with surgical clips in the left pelvis and right   lower quadrant small bowel sutures.  Right external iliac stent with graft extending into the right groin.   There is no flow seen within the stent/graft similar to the prior   contrast-enhanced study suggesting occlusion.    SHIMON TOSCANO M.D.,ATTENDING RADIOLOGIST  This document has been electronically signed. Jun 8 2019  1:22PM      < end of copied text >    < from: TTE Echo Doppler w/o Cont (02.05.18 @ 08:11) >     EXAM:  ECHO TTE W/O CON COMP W/DOPPLR         PROCEDURE DATE:  02/05/2018        INTERPRETATION:  INDICATION: respiratory failure  Referring M.D.:Lona  Blood Pressure 126/59        Weight (kg) :49     Height (cm):157       BSA (sq m): 1.47  Technician: JOJO    Dimensions:    LA 3.4       Normal Values: 2.0 - 4.0 cm    Ao 3.0        Normal Values: 2.0 - 3.8 cm  SEPTUM 0.8       Normal Values: 0.6 - 1.2 cm  PWT 0.8       Normal Values: 0.6 - 1.1 cm  LVIDd 3.4         Normal Values: 3.0 - 5.6 cm  LVIDs 2.3         Normal Values: 1.8 - 4.0 cm    OBSERVATIONS:  Technically difficult bedside ICU study. The patient is vented  Mitral Valve: Calcified mitral annulus and mitral valve leaflets with   normal opening. Mild mitral regurgitation.  Aortic Valve/Aorta: Calcified trileaflet aortic valve with normal opening.  Tricuspid Valve: Calcified trileaflet cusp at annulus with normally   opening valve leaflets. Mild tricuspid regurgitation.  Pulmonic Valve: The pulmonic valve is not well visualized. Probably   normal. Mild PI  Left Atrium: Small to moderate left atrial enlargement  Right Atrium: Normal  Left Ventricle: The endocardium is not well-visualized. There appears to   be grossly preserved left ventricular systolic function. There appears to   be concentric left ventricular hypertrophy. The EF is approximately 65%.  Right Ventricle: Normal right ventricular size and function. There is a   device wire noted in the right heart  Pericardium/Pleura: No pericardial effusion noted. Left pleural effusion   noted  Pulmonary/RV Pressure: The right ventricular systolic pressure is   estimated to be 41mmHg, assuming that the right atrial pressure is   estimated to be 8 mmHg. This is consistent with mild pulmonary   hypertension.  LV Diastolic Function: Stage II diastolic dysfunction    Conclusion: Likely difficult and limited study. Grossly preserved left   ventricular systolic function. Sensation diastolic dysfunction. Left   pleural effusion is noted.    KIET NIETO M.D., ATTENDING CARDIOLOGIST  This document has been electronically signed. Feb 6 2018  7:49PM      < end of copied text >

## 2019-06-10 NOTE — CONSULT NOTE ADULT - SUBJECTIVE AND OBJECTIVE BOX
HPI:  85 yo female w/ PMH of afib on coumadin, CVA and hx of aneurysm repair, HTN, HLD, mitral valve prolapse, PVD s/p stents x 3 (RLE), rheumatoid arthritis,  PUD presents with UGIB complicated by NSTEMI. Sp ICU stay and transfusion and now stable. Blood culture with CNS single set. No fever chills n/v/d CP SOB.    Infectious Disease consult was called to evaluate pt and for antibiotic management.    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  Osteoarthritis  Afib  PVD (peripheral vascular disease)  Neuropathy: (Right lower leg)  H/O cerebral aneurysm repair: brain clips  CVA (cerebral vascular accident): (&quot;Mini-stroke&quot;,1990&#x27;s)  Rheumatoid arthritis  Hyperlipidemia  Hypertension  S/P cataract surgery: (Left eye)  Elective surgery: (&quot;Twisted bowel&quot;, 2014)  Elective surgery: (Exision of cyst on liver, 1985)  S/P ORIF (open reduction internal fixation) fracture: Left hip fx (2012) &amp; R hip fx (2013)  H/O cerebral aneurysm repair: Brain clips (1978(  Renal stone: Cysto stent placement 10/1/2014  PVD (peripheral vascular disease): s/p RLE bypass x 3, most recent 3/2012 Right external iliac to PT bypass w/ PTFE (2012)  S/P FRED (total abdominal hysterectomy): (1987, Hx of &quot;ovarian cancer?&quot;)      Social History--  EtOH: denies   Tobacco: denies   Drug Use: denies     FAMILY HISTORY:  No pertinent family history in first degree relatives      Allergies  No Known Allergies    Home Medications:  aspirin 81 mg oral delayed release tablet: 1 tab(s) orally once a day (14 Mar 2019 22:08)  atorvastatin 40 mg oral tablet: 1 tab(s) orally once a day (at bedtime) (14 Mar 2019 22:08)  Coumadin 2.5 mg oral tablet: 1 tab(s) orally once a day for 5 days (14 Mar 2019 22:08)  Coumadin 5 mg oral tablet: 1 tab(s) orally once a day for 2 days (14 Mar 2019 22:08)  dilTIAZem 120 mg oral tablet: 1 tab(s) orally once a day (14 Mar 2019 22:08)  hydrALAZINE 50 mg oral tablet: 1 tab(s) orally 3 times a day (14 Mar 2019 22:08)  multivitamin:   once a day (14 Mar 2019 22:08)  senna oral tablet: 2 tab(s) orally once a day (at bedtime) (17 Mar 2019 10:48)      Current Inpatient Medications :  OTHER RELEVANT MEDICATIONS :  acetaminophen   Tablet .. 650 milliGRAM(s) Oral every 6 hours  ALBUTerol    0.083% 2.5 milliGRAM(s) Nebulizer every 8 hours  atorvastatin 20 milliGRAM(s) Oral at bedtime  chlorhexidine 2% Cloths 1 Application(s) Topical <User Schedule>  diltiazem    milliGRAM(s) Oral daily  HYDROmorphone  Injectable 1 milliGRAM(s) IV Push every 4 hours PRN  metoprolol succinate  milliGRAM(s) Oral two times a day  oxyCODONE    IR 5 milliGRAM(s) Oral every 4 hours PRN  pantoprazole Infusion 8 mG/Hr IV Continuous <Continuous>      ROS:  CONSTITUTIONAL:  Negative fever or chills  EYES:  Negative  blurry vision or double vision  CARDIOVASCULAR:  Negative for chest pain or palpitations  RESPIRATORY:  Negative for cough, wheezing, or SOB   GASTROINTESTINAL:  Negative for nausea, vomiting, diarrhea, constipation, or abdominal pain  GENITOURINARY:  Negative frequency, urgency , dysuria or hematuria   NEUROLOGIC:  No headache, confusion, dizziness, lightheadedness  All other systems were reviewed and are negative      I&O's Detail    09 Jun 2019 07:01  -  10 Osmani 2019 07:00  --------------------------------------------------------  IN:    Oral Fluid: 220 mL    pantoprazole Infusion: 240 mL  Total IN: 460 mL    OUT:  Total OUT: 0 mL    Total NET: 460 mL      10 Osmani 2019 07:01  -  10 Osmani 2019 21:43  --------------------------------------------------------  IN:    pantoprazole Infusion: 110 mL    Solution: 100 mL  Total IN: 210 mL    OUT:  Total OUT: 0 mL    Total NET: 210 mL    Physical Exam:  Vital Signs Last 24 Hrs  T(C): 36.6 (10 Osmani 2019 19:53), Max: 36.9 (10 Osmani 2019 08:44)  T(F): 97.8 (10 Osmani 2019 19:53), Max: 98.4 (10 Osmani 2019 08:44)  HR: 81 (10 Osmani 2019 19:53) (67 - 88)  BP: 167/98 (10 Osmani 2019 19:53) (122/75 - 167/98)  RR: 17 (10 Osmani 2019 19:53) (16 - 18)  SpO2: 94% (10 Osmani 2019 19:53) (90% - 97%)      General: weak  no acute distress  Eyes: sclera anicteric, pupils equal and reactive to light  ENMT: buccal mucosa moist, pharynx not injected  Neck: supple, trachea midline  Lungs: Decreased  no wheeze/rhonchi  Cardiovascular: regular rate and rhythm, S1 S2  Abdomen: soft, nontender, no organomegaly present, bowel sounds normal  Neurological:  alert and oriented x3, Cranial Nerves II-XII grossly intact  Skin:no increased ecchymosis/petechiae/purpura  Lymph Nodes: no palpable cervical/supraclavicular lymph nodes enlargements  Extremities: no cyanosis/clubbing/edema    Labs:                        10.0   17.58 )-----------( 142      ( 10 Osmani 2019 06:39 )             31.0   06-10    145  |  116<H>  |  55<H>  ----------------------------<  136<H>  4.0   |  20<L>  |  1.20    Ca    8.3<L>      10 Osmani 2019 06:39  Phos  3.1     06-10  Mg     2.3     06-10    TPro  6.2  /  Alb  3.1<L>  /  TBili  1.5<H>  /  DBili  x   /  AST  80<H>  /  ALT  31  /  AlkPhos  57  06-10    Troponin I, Serum (06.10.19 @ 06:39)    Troponin I, Serum: 11.800     RECENT CULTURES:    Culture - Blood (collected 09 Jun 2019 18:15)  Source: .Blood Blood-Venous  Preliminary Report (10 Osmani 2019 19:00):    No growth to date.    Culture - Blood (collected 09 Jun 2019 18:15)  Source: .Blood Blood-Venous  Preliminary Report (10 Osmani 2019 19:00):    No growth to date.    Culture - Blood (collected 08 Jun 2019 16:36)  Source: .Blood Blood-Peripheral  Gram Stain (09 Jun 2019 10:02):    Growth in aerobic bottle: Gram Positive Cocci in Clusters  Final Report (10 Osmani 2019 11:50):    Growth in aerobic bottle: Coag Negative Staphylococcus    Single set isolate, possible contaminant. Contact    Microbiology if susceptibility testing clinically    indicated.  Organism: Blood Culture PCR (10 Osmani 2019 11:50)      -  Coagulase negative Staphylococcus: Detec      Method Type: PCR    Culture - Blood (collected 08 Jun 2019 16:36)  Source: .Blood Blood-Peripheral  Preliminary Report (09 Jun 2019 17:00):    No growth to date.    RADIOLOGY & ADDITIONAL STUDIES:    EXAM:  CT ANGIO CHEST (W)AW IC                          EXAM:  CT ANGIO ABD PELV (W)AW IC                            PROCEDURE DATE:  06/08/2019          INTERPRETATION:  CTA CHEST/ABDOMEN/PELVIS:     CLINICAL INFORMATION: Pain with abdominal aortic aneurysm. Rule out   rupture    COMPARISON: CT abdomen and pelvis of 1/13/2019 and older study of   7/21/2018..    PROCEDURE:  Using multislice helical CT, 2.5 mm sections were obtained   from the thoracic inlet through the ischial tuberosities withthe   administration of intravenous contrast. Oral contrast was not   administered. 95 cc of Omnipaque 350 were administered. MIP sequences are   also performed. Preliminary noncontrast imaging was also performed.    Coronal and sagittal reformationswere performed by  CT technologist and   made available for review.    FINDINGS:  The visualized structures of the lower neck are unremarkable.   The visualized aorta is of normal course and caliber. The heart is not   enlarged. There is no evidence of pericardial effusion. There is   atherosclerotic calcification of the thoracic aorta and its branches.   There is tracheobronchial calcification. There is coronary artery   calcification.    There is no evidence of axillary, hilar, or mediastinal lymphadenopathy.    No focal lung consolidations identified. No evidence of pleural effusion   or pneumothorax. No pulmonary nodules identified. There is extensive   centrilobular emphysema. There is scarring at the lung bases with   probable honeycombing at the right lung base      The liver is of normal contour. there is no evidence of mass. The   gallbladder is remarkable for multiple calcified gallstones No evidence   of intra or extrahepatic biliary dilatation.     The pancreas, spleen, and adrenal glands are grossly unremarkable. There   is no evidence of hydronephrosis or perinephric stranding. No evidence of    nephrolithiasis.The bladder is unremarkable. There is nonobstructive   right lower pole renal calculus measuring 7 mm. There isno   hydronephrosis.    No evidence of lymphadenopathy utilizing CT size criteria. The aorta   demonstrates saccular aneurysm of the mid aorta just below the level of   the renal arteries with a configuration similar to that seen on the   previous study. There is atherosclerotic calcification of the abdominal   aorta and its branches. A stent is seen on the right at the level of the   external iliac artery with graft extending to the groin.    There is no evidence of bowel obstruction. There is no evidence of   pneumoperitoneum or free fluid. There is diverticulosis without   diverticulitis. Surgical clips are seen in the left pelvis. Surgical   sutures are again seen in the right lower quadrant and in the small bowel    The visualized osseous structures demonstrate osteopenia and degenerative   changes of the spine. There is bilateral hip ORIF.    IMPRESSION:  No significant change in the appearance of infrarenal   abdominal aortic aneurysm without evidence of leakage.  Small calcified gallstones are again noted  Nonobstructive right lower pole renal calculus is again seen in  Postsurgical changes with surgical clips in the left pelvis and right   lower quadrant small bowel sutures.  Right external iliac stent with graft extending into the right groin.   There is no flow seen within the stent/graft similar to the prior   contrast-enhanced study suggesting occlusion.    Assessment :   85 yo female w/ PMH of afib on coumadin, CVA and hx of aneurysm repair, HTN, HLD, mitral valve prolapse, PVD s/p stents x 3 (RLE), rheumatoid arthritis,  PUD presents with UGIB complicated by NSTEMI. Sp ICU stay and transfusion and now stable. Blood culture with CNS single set is skin contaminant. No clinical evidence for Infectious process.     Plan :   Monitor off antibiotics  Trend temps and cbc  GI and cardio following  For EGD per GI    Continue with present regime .  Approptiate use of antibiotics and adverse effects reviewed.      I have discussed the above plan of care with patient/family in detail. They expressed understanding of the treatment plan . Risks, benefits and alternatives discussed in detail. I have asked if they have any questions or concerns and appropriately addressed them to the best of my ability .      > 45 minutes spent in direct patient care reviewing  the notes, lab data/ imaging , discussion with multidisciplinary team. All questions were addressed and answered to the best of my capacity .    Thank you for allowing me to participate in the care of your patient .      Cynthia Jaimes MD  Infectious Disease  [phone]

## 2019-06-10 NOTE — PROGRESS NOTE ADULT - PROBLEM SELECTOR PLAN 3
no liver/gb pathology on ct  rec ruq sono   trend labs, avoid hepatotoxins no liver/gb pathology on ct  rec ruq sono when stable  trend labs, avoid hepatotoxins no liver/gb pathology on ct  possibly ischemia induced  trend labs, avoid hepatotoxins  monitor for need of ruq sono pending clinical course    plan dw attg, agreeable

## 2019-06-10 NOTE — PROGRESS NOTE ADULT - PROBLEM SELECTOR PLAN 1
Continue PPI drip  C/o abdminal pain. CT abdomen negative for any acute findings   GI Dr Medellin consulted EGD once stable  Tranfusion to hbg >8  Hold coumadin, s/p reversal

## 2019-06-10 NOTE — PROGRESS NOTE ADULT - ASSESSMENT
83 yo female w/ PMH of afib on coumadin, CVA and hx of aneurysm repair, HTN, HLD, mitral valve prolapse, PVD s/p stents x 3 (RLE), rheumatoid arthritis, cataracts comes with black stools and severe anemia, upper GIB 85 yo female w/ PMH of afib on coumadin, CVA and hx of aneurysm repair, HTN, HLD, mitral valve prolapse, PVD s/p stents x 3 (RLE), rheumatoid arthritis, cataracts presents  with black stools and severe anemia, upper GIB, found to have NSTEMI 85 yo female w/ PMH of afib on coumadin, CVA and hx of aneurysm repair, HTN, HLD, mitral valve prolapse, PVD s/p stents x 3 (RLE), rheumatoid arthritis, cataracts presented c/o abdominal pain with melanotic stools and severe anemia likely secondary to upper GIB,  found to have NSTEMI.

## 2019-06-10 NOTE — PROGRESS NOTE ADULT - SUBJECTIVE AND OBJECTIVE BOX
Patient is a 84y old  Female who presents with a chief complaint of GIB (09 Jun 2019 10:16)       INTERVAL HPI/OVERNIGHT EVENTS:   85 yo female w/ PMH of afib on coumadin, CVA and hx of aneurysm repair, HTN, HLD, mitral valve prolapse, PVD s/p stents x 3 (RLE), rheumatoid arthritis, cataracts comes with black stools and severe anemia, upper GIB. C/o abdominal pain, no nausea, vomiting , chest pain, sob     MEDICATIONS  (STANDING):  acetaminophen   Tablet .. 650 milliGRAM(s) Oral every 6 hours  atorvastatin 20 milliGRAM(s) Oral at bedtime  chlorhexidine 2% Cloths 1 Application(s) Topical <User Schedule>  diltiazem    milliGRAM(s) Oral daily  metoprolol succinate  milliGRAM(s) Oral two times a day  pantoprazole Infusion 8 mG/Hr (10 mL/Hr) IV Continuous <Continuous>    MEDICATIONS  (PRN):  HYDROmorphone  Injectable 1 milliGRAM(s) IV Push every 4 hours PRN Severe Pain (7 - 10)  oxyCODONE    IR 5 milliGRAM(s) Oral every 4 hours PRN Moderate Pain (4 - 6)      Allergies    No Known Allergies    Intolerances        REVIEW OF SYSTEMS:  CONSTITUTIONAL: + Generalized wekaness  EYES: No eye pain, visual disturbances, or discharge  ENMT:  No difficulty hearing, tinnitus, vertigo; No sinus or throat pain  NECK: No pain or stiffness  RESPIRATORY: No cough, wheezing, chills or hemoptysis; No shortness of breath  CARDIOVASCULAR: No chest pain, palpitations, dizziness, or leg swelling  GASTROINTESTINAL: +  abdominal pain. No nausea, vomiting, or hematemesis  GENITOURINARY: No dysuria, frequency, hematuria, or incontinence  NEUROLOGICAL: No headache,  loss of strength, numbness, or tremors  LYMPH NODES: No enlarged glands  ENDOCRINE: No heat or cold intolerance; No hair loss; No polydipsia or polyuria  MUSCULOSKELETAL: No joint pain or swelling; No muscle, back, or extremity pain  HEME/LYMPH: No easy bruising, or bleeding gums  ALLERGY AND IMMUNOLOGIC: No hives or eczema    Vital Signs Last 24 Hrs  T(C): 36.9 (10 Osmani 2019 08:44), Max: 36.9 (10 Osmani 2019 08:44)  T(F): 98.4 (10 Osmani 2019 08:44), Max: 98.4 (10 Osmani 2019 08:44)  HR: 88 (10 Osmani 2019 08:44) (78 - 88)  BP: 162/92 (10 Osmani 2019 08:44) (126/80 - 174/95)  BP(mean): 97 (09 Jun 2019 15:00) (97 - 128)  RR: 17 (10 Osmani 2019 08:44) (8 - 18)  SpO2: 90% (10 Osmani 2019 08:44) (90% - 99%)    PHYSICAL EXAM:  GENERAL: Frail, pale   HEAD:  Atraumatic, Normocephalic  EYES: EOMI, PERRLA, conjunctiva and sclera clear  ENMT: No tonsillar erythema, exudates, or enlargement; Moist mucous membranes  NECK: Supple, No JVD, Normal thyroid  NERVOUS SYSTEM:  Alert & Awake, pale, ill looking   CHEST/LUNG: Clear to auscultation bilaterally; No rales, rhonchi, wheezing, or rubs  HEART: S1S2+,  Regular rate and rhythm  ABDOMEN: Soft,  Nondistended; Bowel sounds present  EXTREMITIES:  2+ Peripheral Pulses, No clubbing, cyanosis    LABS:                        10.0   17.58 )-----------( 142      ( 10 Osmani 2019 06:39 )             31.0     10 Osmani 2019 06:39    145    |  116    |  55     ----------------------------<  136    4.0     |  20     |  1.20     Ca    8.3        10 Osmani 2019 06:39  Phos  3.1       10 Osmani 2019 06:39  Mg     2.3       10 Osmani 2019 06:39    TPro  6.2    /  Alb  3.1    /  TBili  1.5    /  DBili  x      /  AST  80     /  ALT  31     /  AlkPhos  57     10 Osmani 2019 06:39    PT/INR - ( 09 Jun 2019 08:45 )   PT: 11.3 sec;   INR: 1.00 ratio         PTT - ( 08 Jun 2019 19:29 )  PTT:23.4 sec  CAPILLARY BLOOD GLUCOSE        BLOOD CULTURE  06-08 @ 16:36   No growth to date.  --  Blood Culture PCR    RADIOLOGY & ADDITIONAL TESTS:    Imaging Personally Reviewed:  [ ] YES     Consultant(s) Notes Reviewed:      Care Discussed with Consultants/Other Providers: Patient is a 84y old  Female who presents with a chief complaint of GIB (09 Jun 2019 10:16)       INTERVAL HPI/OVERNIGHT EVENTS:   85 yo female w/ PMH of afib on coumadin, CVA and hx of aneurysm repair, HTN, HLD, mitral valve prolapse, PVD s/p stents x 3 (RLE), rheumatoid arthritis, cataracts comes with black stools and severe anemia, upper GIB, NSTEMI. Seen and examined at bedside,  denies  nausea, vomiting , chest pain, sob     MEDICATIONS  (STANDING):  acetaminophen   Tablet .. 650 milliGRAM(s) Oral every 6 hours  atorvastatin 20 milliGRAM(s) Oral at bedtime  chlorhexidine 2% Cloths 1 Application(s) Topical <User Schedule>  diltiazem    milliGRAM(s) Oral daily  metoprolol succinate  milliGRAM(s) Oral two times a day  pantoprazole Infusion 8 mG/Hr (10 mL/Hr) IV Continuous <Continuous>    MEDICATIONS  (PRN):  HYDROmorphone  Injectable 1 milliGRAM(s) IV Push every 4 hours PRN Severe Pain (7 - 10)  oxyCODONE    IR 5 milliGRAM(s) Oral every 4 hours PRN Moderate Pain (4 - 6)      Allergies    No Known Allergies    Intolerances        REVIEW OF SYSTEMS:  CONSTITUTIONAL: + Generalized wekaness  EYES: No eye pain, visual disturbances, or discharge  ENMT:  No difficulty hearing, tinnitus, vertigo; No sinus or throat pain  NECK: No pain or stiffness  RESPIRATORY: No cough, wheezing, chills or hemoptysis; No shortness of breath  CARDIOVASCULAR: No chest pain, palpitations, dizziness, or leg swelling  GASTROINTESTINAL: +  abdominal pain. No nausea, vomiting, or hematemesis  GENITOURINARY: No dysuria, frequency, hematuria, or incontinence  NEUROLOGICAL: No headache,  loss of strength, numbness, or tremors  LYMPH NODES: No enlarged glands  ENDOCRINE: No heat or cold intolerance; No hair loss; No polydipsia or polyuria  MUSCULOSKELETAL: No joint pain or swelling; No muscle, back, or extremity pain  HEME/LYMPH: No easy bruising, or bleeding gums  ALLERGY AND IMMUNOLOGIC: No hives or eczema    Vital Signs Last 24 Hrs  T(C): 36.9 (10 Osmani 2019 08:44), Max: 36.9 (10 Osmani 2019 08:44)  T(F): 98.4 (10 Osmani 2019 08:44), Max: 98.4 (10 Osmani 2019 08:44)  HR: 88 (10 Osmani 2019 08:44) (78 - 88)  BP: 162/92 (10 Osmani 2019 08:44) (126/80 - 174/95)  BP(mean): 97 (09 Jun 2019 15:00) (97 - 128)  RR: 17 (10 Osmani 2019 08:44) (8 - 18)  SpO2: 90% (10 Osmani 2019 08:44) (90% - 99%)    PHYSICAL EXAM:  GENERAL: Frail, pale   HEAD:  Atraumatic, Normocephalic  EYES: EOMI, PERRLA, conjunctiva and sclera clear  ENMT: No tonsillar erythema, exudates, or enlargement; Moist mucous membranes  NECK: Supple, No JVD, Normal thyroid  NERVOUS SYSTEM:  Alert & Awake, pale, ill looking   CHEST/LUNG: Clear to auscultation bilaterally; No rales, rhonchi, wheezing, or rubs  HEART: S1S2+,  Regular rate and rhythm  ABDOMEN: Soft,  Nondistended; Bowel sounds present  EXTREMITIES:  2+ Peripheral Pulses, No clubbing, cyanosis    LABS:                        10.0   17.58 )-----------( 142      ( 10 Osmani 2019 06:39 )             31.0     10 Osmani 2019 06:39    145    |  116    |  55     ----------------------------<  136    4.0     |  20     |  1.20     Ca    8.3        10 Osmani 2019 06:39  Phos  3.1       10 Osmani 2019 06:39  Mg     2.3       10 Osmani 2019 06:39    TPro  6.2    /  Alb  3.1    /  TBili  1.5    /  DBili  x      /  AST  80     /  ALT  31     /  AlkPhos  57     10 Osmani 2019 06:39    PT/INR - ( 09 Jun 2019 08:45 )   PT: 11.3 sec;   INR: 1.00 ratio         PTT - ( 08 Jun 2019 19:29 )  PTT:23.4 sec  CAPILLARY BLOOD GLUCOSE        BLOOD CULTURE  06-08 @ 16:36   No growth to date.  --  Blood Culture PCR    RADIOLOGY & ADDITIONAL TESTS:    Imaging Personally Reviewed:  [ ] YES     Consultant(s) Notes Reviewed:      Care Discussed with Consultants/Other Providers: Patient is a 84y old  Female who presents with a chief complaint of GIB (09 Jun 2019 10:16)       INTERVAL HPI/OVERNIGHT EVENTS:   85 yo female w/ PMH of afib on coumadin, CVA and hx of aneurysm repair, HTN, HLD, mitral valve prolapse, PVD s/p stents x 3 (RLE), rheumatoid arthritis, cataracts presented c/o abdominal pain with melanotic stools and severe anemia likely secondary to upper GIB,  NSTEMI, Seen and examined at bedside,  still has abdominal pain, denies  nausea, vomiting , chest pain, sob     MEDICATIONS  (STANDING):  acetaminophen   Tablet .. 650 milliGRAM(s) Oral every 6 hours  atorvastatin 20 milliGRAM(s) Oral at bedtime  chlorhexidine 2% Cloths 1 Application(s) Topical <User Schedule>  diltiazem    milliGRAM(s) Oral daily  metoprolol succinate  milliGRAM(s) Oral two times a day  pantoprazole Infusion 8 mG/Hr (10 mL/Hr) IV Continuous <Continuous>    MEDICATIONS  (PRN):  HYDROmorphone  Injectable 1 milliGRAM(s) IV Push every 4 hours PRN Severe Pain (7 - 10)  oxyCODONE    IR 5 milliGRAM(s) Oral every 4 hours PRN Moderate Pain (4 - 6)      Allergies    No Known Allergies    Intolerances        REVIEW OF SYSTEMS:  CONSTITUTIONAL: + Generalized wekaness  EYES: No eye pain, visual disturbances, or discharge  ENMT:  No difficulty hearing, tinnitus, vertigo; No sinus or throat pain  NECK: No pain or stiffness  RESPIRATORY: No cough, wheezing, chills or hemoptysis; No shortness of breath  CARDIOVASCULAR: No chest pain, palpitations, dizziness, or leg swelling  GASTROINTESTINAL: +  abdominal pain. No nausea, vomiting, or hematemesis  GENITOURINARY: No dysuria, frequency, hematuria, or incontinence  NEUROLOGICAL: No headache,  loss of strength, numbness, or tremors  LYMPH NODES: No enlarged glands  ENDOCRINE: No heat or cold intolerance; No hair loss; No polydipsia or polyuria  MUSCULOSKELETAL: No joint pain or swelling; No muscle, back, or extremity pain  HEME/LYMPH: No easy bruising, or bleeding gums  ALLERGY AND IMMUNOLOGIC: No hives or eczema    Vital Signs Last 24 Hrs  T(C): 36.9 (10 Osmani 2019 08:44), Max: 36.9 (10 Osmani 2019 08:44)  T(F): 98.4 (10 Osmani 2019 08:44), Max: 98.4 (10 Osmani 2019 08:44)  HR: 88 (10 Osmani 2019 08:44) (78 - 88)  BP: 162/92 (10 Osmani 2019 08:44) (126/80 - 174/95)  BP(mean): 97 (09 Jun 2019 15:00) (97 - 128)  RR: 17 (10 Osmani 2019 08:44) (8 - 18)  SpO2: 90% (10 Osmani 2019 08:44) (90% - 99%)    PHYSICAL EXAM:  GENERAL: Frail, pale   HEAD:  Atraumatic, Normocephalic  EYES: EOMI, PERRLA, conjunctiva and sclera clear  ENMT: No tonsillar erythema, exudates, or enlargement; Moist mucous membranes  NECK: Supple, No JVD, Normal thyroid  NERVOUS SYSTEM:  Alert & Awake, pale, ill looking   CHEST/LUNG: Clear to auscultation bilaterally; No rales, rhonchi, wheezing, or rubs  HEART: S1S2+,  Regular rate and rhythm  ABDOMEN: Soft,  Nondistended; Bowel sounds present  EXTREMITIES:  2+ Peripheral Pulses, No clubbing, cyanosis    LABS:                        10.0   17.58 )-----------( 142      ( 10 Osmani 2019 06:39 )             31.0     10 Osmani 2019 06:39    145    |  116    |  55     ----------------------------<  136    4.0     |  20     |  1.20     Ca    8.3        10 Osmani 2019 06:39  Phos  3.1       10 Osmani 2019 06:39  Mg     2.3       10 Osmani 2019 06:39    TPro  6.2    /  Alb  3.1    /  TBili  1.5    /  DBili  x      /  AST  80     /  ALT  31     /  AlkPhos  57     10 Osmani 2019 06:39    PT/INR - ( 09 Jun 2019 08:45 )   PT: 11.3 sec;   INR: 1.00 ratio         PTT - ( 08 Jun 2019 19:29 )  PTT:23.4 sec  CAPILLARY BLOOD GLUCOSE        BLOOD CULTURE  06-08 @ 16:36   No growth to date.  --  Blood Culture PCR    RADIOLOGY & ADDITIONAL TESTS:    Imaging Personally Reviewed:  [ ] YES     Consultant(s) Notes Reviewed:      Care Discussed with Consultants/Other Providers:

## 2019-06-10 NOTE — PROGRESS NOTE ADULT - PROBLEM SELECTOR PLAN 2
Likely secondary to acute demand ischemia in setting of severe anemia  Not a candidate for antiplatelet agents secondary to GIB  TTE  Statin, BB   Cardio following

## 2019-06-11 DIAGNOSIS — Z01.818 ENCOUNTER FOR OTHER PREPROCEDURAL EXAMINATION: ICD-10-CM

## 2019-06-11 LAB
ANION GAP SERPL CALC-SCNC: 6 MMOL/L — SIGNIFICANT CHANGE UP (ref 5–17)
BUN SERPL-MCNC: 47 MG/DL — HIGH (ref 7–23)
CALCIUM SERPL-MCNC: 7.9 MG/DL — LOW (ref 8.5–10.1)
CHLORIDE SERPL-SCNC: 116 MMOL/L — HIGH (ref 96–108)
CK MB BLD-MCNC: 6.4 % — HIGH (ref 0–3.5)
CK MB CFR SERPL CALC: 6.2 NG/ML — HIGH (ref 0–3.6)
CK SERPL-CCNC: 97 U/L — SIGNIFICANT CHANGE UP (ref 26–192)
CO2 SERPL-SCNC: 25 MMOL/L — SIGNIFICANT CHANGE UP (ref 22–31)
CREAT SERPL-MCNC: 1.3 MG/DL — SIGNIFICANT CHANGE UP (ref 0.5–1.3)
GLUCOSE SERPL-MCNC: 108 MG/DL — HIGH (ref 70–99)
HCT VFR BLD CALC: 26.6 % — LOW (ref 34.5–45)
HGB BLD-MCNC: 8.5 G/DL — LOW (ref 11.5–15.5)
MCHC RBC-ENTMCNC: 29.2 PG — SIGNIFICANT CHANGE UP (ref 27–34)
MCHC RBC-ENTMCNC: 32 GM/DL — SIGNIFICANT CHANGE UP (ref 32–36)
MCV RBC AUTO: 91.4 FL — SIGNIFICANT CHANGE UP (ref 80–100)
NRBC # BLD: 0 /100 WBCS — SIGNIFICANT CHANGE UP (ref 0–0)
PLATELET # BLD AUTO: 120 K/UL — LOW (ref 150–400)
POTASSIUM SERPL-MCNC: 4.1 MMOL/L — SIGNIFICANT CHANGE UP (ref 3.5–5.3)
POTASSIUM SERPL-SCNC: 4.1 MMOL/L — SIGNIFICANT CHANGE UP (ref 3.5–5.3)
RBC # BLD: 2.91 M/UL — LOW (ref 3.8–5.2)
RBC # FLD: 17.2 % — HIGH (ref 10.3–14.5)
SODIUM SERPL-SCNC: 147 MMOL/L — HIGH (ref 135–145)
TROPONIN I SERPL-MCNC: 7.92 NG/ML — HIGH (ref 0.01–0.04)
WBC # BLD: 11.13 K/UL — HIGH (ref 3.8–10.5)
WBC # FLD AUTO: 11.13 K/UL — HIGH (ref 3.8–10.5)

## 2019-06-11 PROCEDURE — 99233 SBSQ HOSP IP/OBS HIGH 50: CPT

## 2019-06-11 RX ORDER — SUCRALFATE 1 G
1 TABLET ORAL
Refills: 0 | Status: DISCONTINUED | OUTPATIENT
Start: 2019-06-11 | End: 2019-06-14

## 2019-06-11 RX ORDER — FUROSEMIDE 40 MG
20 TABLET ORAL ONCE
Refills: 0 | Status: COMPLETED | OUTPATIENT
Start: 2019-06-11 | End: 2019-06-11

## 2019-06-11 RX ADMIN — OXYCODONE HYDROCHLORIDE 5 MILLIGRAM(S): 5 TABLET ORAL at 20:20

## 2019-06-11 RX ADMIN — ATORVASTATIN CALCIUM 20 MILLIGRAM(S): 80 TABLET, FILM COATED ORAL at 20:21

## 2019-06-11 RX ADMIN — HYDROMORPHONE HYDROCHLORIDE 1 MILLIGRAM(S): 2 INJECTION INTRAMUSCULAR; INTRAVENOUS; SUBCUTANEOUS at 21:45

## 2019-06-11 RX ADMIN — Medication 650 MILLIGRAM(S): at 00:10

## 2019-06-11 RX ADMIN — HYDROMORPHONE HYDROCHLORIDE 1 MILLIGRAM(S): 2 INJECTION INTRAMUSCULAR; INTRAVENOUS; SUBCUTANEOUS at 04:20

## 2019-06-11 RX ADMIN — CHLORHEXIDINE GLUCONATE 1 APPLICATION(S): 213 SOLUTION TOPICAL at 05:45

## 2019-06-11 RX ADMIN — Medication 20 MILLIGRAM(S): at 13:24

## 2019-06-11 RX ADMIN — Medication 650 MILLIGRAM(S): at 00:44

## 2019-06-11 RX ADMIN — Medication 650 MILLIGRAM(S): at 17:24

## 2019-06-11 RX ADMIN — Medication 100 MILLIGRAM(S): at 17:25

## 2019-06-11 RX ADMIN — Medication 650 MILLIGRAM(S): at 18:25

## 2019-06-11 RX ADMIN — HYDROMORPHONE HYDROCHLORIDE 1 MILLIGRAM(S): 2 INJECTION INTRAMUSCULAR; INTRAVENOUS; SUBCUTANEOUS at 10:40

## 2019-06-11 RX ADMIN — HYDROMORPHONE HYDROCHLORIDE 1 MILLIGRAM(S): 2 INJECTION INTRAMUSCULAR; INTRAVENOUS; SUBCUTANEOUS at 04:03

## 2019-06-11 RX ADMIN — Medication 650 MILLIGRAM(S): at 06:00

## 2019-06-11 RX ADMIN — Medication 100 MILLIGRAM(S): at 05:44

## 2019-06-11 RX ADMIN — ALBUTEROL 2.5 MILLIGRAM(S): 90 AEROSOL, METERED ORAL at 19:33

## 2019-06-11 RX ADMIN — HYDROMORPHONE HYDROCHLORIDE 1 MILLIGRAM(S): 2 INJECTION INTRAMUSCULAR; INTRAVENOUS; SUBCUTANEOUS at 10:08

## 2019-06-11 RX ADMIN — OXYCODONE HYDROCHLORIDE 5 MILLIGRAM(S): 5 TABLET ORAL at 20:35

## 2019-06-11 RX ADMIN — ALBUTEROL 2.5 MILLIGRAM(S): 90 AEROSOL, METERED ORAL at 07:32

## 2019-06-11 RX ADMIN — Medication 1 GRAM(S): at 17:24

## 2019-06-11 RX ADMIN — Medication 650 MILLIGRAM(S): at 11:03

## 2019-06-11 RX ADMIN — Medication 650 MILLIGRAM(S): at 05:44

## 2019-06-11 RX ADMIN — Medication 650 MILLIGRAM(S): at 11:06

## 2019-06-11 RX ADMIN — PANTOPRAZOLE SODIUM 10 MG/HR: 20 TABLET, DELAYED RELEASE ORAL at 17:24

## 2019-06-11 RX ADMIN — ALBUTEROL 2.5 MILLIGRAM(S): 90 AEROSOL, METERED ORAL at 15:04

## 2019-06-11 RX ADMIN — HYDROMORPHONE HYDROCHLORIDE 1 MILLIGRAM(S): 2 INJECTION INTRAMUSCULAR; INTRAVENOUS; SUBCUTANEOUS at 21:27

## 2019-06-11 RX ADMIN — Medication 120 MILLIGRAM(S): at 05:44

## 2019-06-11 NOTE — PROGRESS NOTE ADULT - PROBLEM SELECTOR PLAN 3
Likely secondary to acute demand ischemia in setting of severe anemia  Not a candidate for antiplatelet agents secondary to GIB  TTE reviewed  Statin, BB   Cardio following

## 2019-06-11 NOTE — PHARMACOTHERAPY INTERVENTION NOTE - COMMENTS
Spoke with MD as patient has received pantoprazole infusion >72 hours and recommended switching over to injectable/oral PPI.  MD will reach out to GI about duration.

## 2019-06-11 NOTE — PROGRESS NOTE ADULT - SUBJECTIVE AND OBJECTIVE BOX
[facility] -- Dahiana Bañuelos, Toney Horner, Truman Falcon Savella  Office # [phone]    Follow Up:  Profound Anemia, NSTEMI    Subjective/Observations: Awake and alert on evaluation.  Admits to have improved pain and described her pain as staring from her epigastrium radiating to her chest.  Denies SOB, CRABTREE, or orthopnea.      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  Osteoarthritis  Afib  PVD (peripheral vascular disease)  Neuropathy: (Right lower leg)  H/O cerebral aneurysm repair: brain clips  CVA (cerebral vascular accident): (&quot;Mini-stroke&quot;,1990&#x27;s)  Rheumatoid arthritis  Hyperlipidemia  Hypertension  S/P cataract surgery: (Left eye)  Elective surgery: (&quot;Twisted bowel&quot;, 2014)  Elective surgery: (Exision of cyst on liver, 1985)  S/P ORIF (open reduction internal fixation) fracture: Left hip fx (2012) &amp; R hip fx (2013)  H/O cerebral aneurysm repair: Brain clips (1978(  Renal stone: Cysto stent placement 10/1/2014  PVD (peripheral vascular disease): s/p RLE bypass x 3, most recent 3/2012 Right external iliac to PT bypass w/ PTFE (2012)  S/P FRED (total abdominal hysterectomy): (1987, Hx of &quot;ovarian cancer?&quot;)    MEDICATIONS  (STANDING):  acetaminophen   Tablet .. 650 milliGRAM(s) Oral every 6 hours  ALBUTerol    0.083% 2.5 milliGRAM(s) Nebulizer every 8 hours  atorvastatin 20 milliGRAM(s) Oral at bedtime  chlorhexidine 2% Cloths 1 Application(s) Topical <User Schedule>  diltiazem    milliGRAM(s) Oral daily  metoprolol succinate  milliGRAM(s) Oral two times a day  pantoprazole Infusion 8 mG/Hr (10 mL/Hr) IV Continuous <Continuous>  sucralfate 1 Gram(s) Oral two times a day    MEDICATIONS  (PRN):  HYDROmorphone  Injectable 1 milliGRAM(s) IV Push every 4 hours PRN Severe Pain (7 - 10)  oxyCODONE    IR 5 milliGRAM(s) Oral every 4 hours PRN Moderate Pain (4 - 6)    Allergies    No Known Allergies    Intolerances    Vital Signs Last 24 Hrs  T(C): 36.6 (11 Jun 2019 07:29), Max: 36.9 (11 Jun 2019 05:10)  T(F): 97.8 (11 Jun 2019 07:29), Max: 98.4 (11 Jun 2019 05:10)  HR: 86 (11 Jun 2019 07:40) (67 - 86)  BP: 133/79 (11 Jun 2019 07:29) (122/75 - 167/98)  BP(mean): --  RR: 18 (11 Jun 2019 07:29) (16 - 18)  SpO2: 94% (11 Jun 2019 07:40) (93% - 99%)    I&O's Summary    10 Osmani 2019 07:01  -  11 Jun 2019 07:00  --------------------------------------------------------  IN: 330 mL / OUT: 150 mL / NET: 180 mL    11 Jun 2019 07:01  -  11 Jun 2019 11:39  --------------------------------------------------------  IN: 427 mL / OUT: 200 mL / NET: 227 mL    PHYSICAL EXAM:  TELE: NSR  Constitutional: NAD, awake and alert, cachectic  HEENT: Moist Mucous Membranes, Anicteric  Pulmonary: Non-labored, breath sounds are clear bilaterally, No wheezing, rales or rhonchi  Cardiovascular: Regular, S1 and S2, No murmurs, rubs, gallops or clicks  Gastrointestinal: Bowel Sounds present, soft, nontender.   Lymph: No peripheral edema. No lymphadenopathy.  Skin: No visible rashes or ulcers.  Very pale  Psych:  Mood & affect appropriate     LABS: All Labs Reviewed:                        8.5    11.13 )-----------( 120      ( 11 Jun 2019 06:28 )             26.6                         10.0   17.58 )-----------( 142      ( 10 Osmani 2019 06:39 )             31.0                         9.5    17.09 )-----------( 144      ( 09 Jun 2019 15:46 )             28.8     11 Jun 2019 06:28    147    |  116    |  47     ----------------------------<  108    4.1     |  25     |  1.30   10 Osmani 2019 06:39    145    |  116    |  55     ----------------------------<  136    4.0     |  20     |  1.20   09 Jun 2019 08:45    149    |  118    |  66     ----------------------------<  125    4.3     |  21     |  1.30     Ca    7.9        11 Jun 2019 06:28  Ca    8.3        10 Osmani 2019 06:39  Ca    8.0        09 Jun 2019 08:45  Phos  3.1       10 Osmani 2019 06:39  Phos  4.0       09 Jun 2019 08:45  Mg     2.3       10 Osmani 2019 06:39  Mg     2.3       09 Jun 2019 08:45  Mg     2.0       08 Jun 2019 19:29    TPro  6.2    /  Alb  3.1    /  TBili  1.5    /  DBili  x      /  AST  80     /  ALT  31     /  AlkPhos  57     10 Osmani 2019 06:39  TPro  5.9    /  Alb  2.9    /  TBili  1.2    /  DBili  x      /  AST  112    /  ALT  29     /  AlkPhos  51     09 Jun 2019 08:45    CARDIAC MARKERS ( 11 Jun 2019 06:28 )  7.920 ng/mL / x     / 97 U/L / x     / 6.2 ng/mL  CARDIAC MARKERS ( 10 Osmani 2019 06:39 )  11.800 ng/mL / x     / 277 U/L / x     / 26.9 ng/mL  CARDIAC MARKERS ( 09 Jun 2019 15:46 )  22.100 ng/mL / x     / 519 U/L / x     / 65.1 ng/mL    < from: CT Angio Chest w/ IV Cont (06.08.19 @ 13:02) >    EXAM:  CT ANGIO CHEST (W)AW IC                          EXAM:  CT ANGIO ABD PELV (W)AW IC                          PROCEDURE DATE:  06/08/2019        INTERPRETATION:  CTA CHEST/ABDOMEN/PELVIS:     CLINICAL INFORMATION: Pain with abdominal aortic aneurysm. Rule out   rupture    COMPARISON: CT abdomen and pelvis of 1/13/2019 and older study of   7/21/2018..    PROCEDURE:  Using multislice helical CT, 2.5 mm sections were obtained   from the thoracic inlet through the ischial tuberosities withthe   administration of intravenous contrast. Oral contrast was not   administered. 95 cc of Omnipaque 350 were administered. MIP sequences are   also performed. Preliminary noncontrast imaging was also performed.    Coronal and sagittal reformationswere performed by  CT technologist and   made available for review.    FINDINGS:  The visualized structures of the lower neck are unremarkable.   The visualized aorta is of normal course and caliber. The heart is not   enlarged. There is no evidence of pericardial effusion. There is   atherosclerotic calcification of the thoracic aorta and its branches.   There is tracheobronchial calcification. There is coronary artery   calcification.    There is no evidence of axillary, hilar, or mediastinal lymphadenopathy.    No focal lung consolidations identified. No evidence of pleural effusion   or pneumothorax. No pulmonary nodules identified. There is extensive   centrilobular emphysema. There is scarring at the lung bases with   probable honeycombing at the right lung base      The liver is of normal contour. there is no evidence of mass. The   gallbladder is remarkable for multiple calcified gallstones No evidence   of intra or extrahepatic biliary dilatation.     The pancreas, spleen, and adrenal glands are grossly unremarkable. There   is no evidence of hydronephrosis or perinephric stranding. No evidence of    nephrolithiasis.The bladder is unremarkable. There is nonobstructive   right lower pole renal calculus measuring 7 mm. There isno   hydronephrosis.    No evidence of lymphadenopathy utilizing CT size criteria. The aorta   demonstrates saccular aneurysm of the mid aorta just below the level of   the renal arteries with a configuration similar to that seen on the   previous study. There is atherosclerotic calcification of the abdominal   aorta and its branches. A stent is seen on the right at the level of the   external iliac artery with graft extending to the groin.    There is no evidence of bowel obstruction. There is no evidence of   pneumoperitoneum or free fluid. There is diverticulosis without   diverticulitis. Surgical clips are seen in the left pelvis. Surgical   sutures are again seen in the right lower quadrant and in the small bowel    The visualized osseous structures demonstrate osteopenia and degenerative   changes of the spine. There is bilateral hip ORIF.    IMPRESSION:  No significant change in the appearance of infrarenal   abdominal aortic aneurysm without evidence of leakage.  Small calcified gallstones are again noted  Nonobstructive right lower pole renal calculus is again seen in  Postsurgical changes with surgical clips in the left pelvis and right   lower quadrant small bowel sutures.  Right external iliac stent with graft extending into the right groin.   There is no flow seen within the stent/graft similar to the prior   contrast-enhanced study suggesting occlusion.    SHIMON TOSCANO M.D.,ATTENDING RADIOLOGIST  This document has been electronically signed. Jun 8 2019  1:22PM      < end of copied text >    < from: TTE Echo Doppler w/o Cont (02.05.18 @ 08:11) >     EXAM:  ECHO TTE W/O CON COMP W/DOPPLR         PROCEDURE DATE:  02/05/2018        INTERPRETATION:  INDICATION: respiratory failure  Referring M.TORI:Lona  Blood Pressure 126/59        Weight (kg) :49     Height (cm):157       BSA (sq m): 1.47  Technician: JOJO    Dimensions:    LA 3.4       Normal Values: 2.0 - 4.0 cm    Ao 3.0        Normal Values: 2.0 - 3.8 cm  SEPTUM 0.8       Normal Values: 0.6 - 1.2 cm  PWT 0.8       Normal Values: 0.6 - 1.1 cm  LVIDd 3.4         Normal Values: 3.0 - 5.6 cm  LVIDs 2.3         Normal Values: 1.8 - 4.0 cm    OBSERVATIONS:  Technically difficult bedside ICU study. The patient is vented  Mitral Valve: Calcified mitral annulus and mitral valve leaflets with   normal opening. Mild mitral regurgitation.  Aortic Valve/Aorta: Calcified trileaflet aortic valve with normal opening.  Tricuspid Valve: Calcified trileaflet cusp at annulus with normally   opening valve leaflets. Mild tricuspid regurgitation.  Pulmonic Valve: The pulmonic valve is not well visualized. Probably   normal. Mild PI  Left Atrium: Small to moderate left atrial enlargement  Right Atrium: Normal  Left Ventricle: The endocardium is not well-visualized. There appears to   be grossly preserved left ventricular systolic function. There appears to   be concentric left ventricular hypertrophy. The EF is approximately 65%.  Right Ventricle: Normal right ventricular size and function. There is a   device wire noted in the right heart  Pericardium/Pleura: No pericardial effusion noted. Left pleural effusion   noted  Pulmonary/RV Pressure: The right ventricular systolic pressure is   estimated to be 41mmHg, assuming that the right atrial pressure is   estimated to be 8 mmHg. This is consistent with mild pulmonary   hypertension.  LV Diastolic Function: Stage II diastolic dysfunction    Conclusion: Likely difficult and limited study. Grossly preserved left   ventricular systolic function. Sensation diastolic dysfunction. Left   pleural effusion is noted.    KIET NIETO M.D., ATTENDING CARDIOLOGIST  This document has been electronically signed. Feb 6 2018  7:49PM      < end of copied text >

## 2019-06-11 NOTE — PROGRESS NOTE ADULT - SUBJECTIVE AND OBJECTIVE BOX
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  Osteoarthritis  Afib  PVD (peripheral vascular disease)  Neuropathy: (Right lower leg)  Gout  H/O cerebral aneurysm repair: brain clips  CVA (cerebral vascular accident): (&quot;Mini-stroke&quot;,1990&#x27;s)  Rheumatoid arthritis  Asthma  Hyperlipidemia  Hypertension  S/P cataract surgery: (Left eye)  Elective surgery: (&quot;Twisted bowel&quot;, 2014)  Elective surgery: (Exision of cyst on liver, 1985)  S/P ORIF (open reduction internal fixation) fracture: Left hip fx (2012) &amp; R hip fx (2013)  H/O cerebral aneurysm repair: Brain clips (1978(  Renal stone: Cysto stent placement 10/1/2014  PVD (peripheral vascular disease): s/p RLE bypass x 3, most recent 3/2012 Right external iliac to PT bypass w/ PTFE (2012)  S/P FRED (total abdominal hysterectomy): (1987, Hx of &quot;ovarian cancer?&quot;)        Medication list         MEDICATIONS  (STANDING):  acetaminophen   Tablet .. 650 milliGRAM(s) Oral every 6 hours  ALBUTerol    0.083% 2.5 milliGRAM(s) Nebulizer every 8 hours  atorvastatin 20 milliGRAM(s) Oral at bedtime  chlorhexidine 2% Cloths 1 Application(s) Topical <User Schedule>  diltiazem    milliGRAM(s) Oral daily  metoprolol succinate  milliGRAM(s) Oral two times a day  pantoprazole Infusion 8 mG/Hr (10 mL/Hr) IV Continuous <Continuous>    MEDICATIONS  (PRN):  HYDROmorphone  Injectable 1 milliGRAM(s) IV Push every 4 hours PRN Severe Pain (7 - 10)  oxyCODONE    IR 5 milliGRAM(s) Oral every 4 hours PRN Moderate Pain (4 - 6)         Vitals log        ICU Vital Signs Last 24 Hrs  T(C): 36.6 (11 Jun 2019 07:29), Max: 36.9 (10 Osmani 2019 08:44)  T(F): 97.8 (11 Jun 2019 07:29), Max: 98.4 (10 Osmani 2019 08:44)  HR: 68 (11 Jun 2019 07:29) (67 - 88)  BP: 133/79 (11 Jun 2019 07:29) (122/75 - 167/98)  BP(mean): --  ABP: --  ABP(mean): --  RR: 18 (11 Jun 2019 07:29) (16 - 18)  SpO2: 99% (11 Jun 2019 07:29) (90% - 99%)           Input and Output:  I&O's Detail    10 Osmani 2019 07:01  -  11 Jun 2019 07:00  --------------------------------------------------------  IN:    pantoprazole Infusion: 230 mL    Solution: 100 mL  Total IN: 330 mL    OUT:    Voided: 150 mL  Total OUT: 150 mL    Total NET: 180 mL          Lab Data                        8.5    11.13 )-----------( 120      ( 11 Jun 2019 06:28 )             26.6     06-11    147<H>  |  116<H>  |  47<H>  ----------------------------<  108<H>  4.1   |  25  |  1.30    Ca    7.9<L>      11 Jun 2019 06:28  Phos  3.1     06-10  Mg     2.3     06-10    TPro  6.2  /  Alb  3.1<L>  /  TBili  1.5<H>  /  DBili  x   /  AST  80<H>  /  ALT  31  /  AlkPhos  57  06-10      CARDIAC MARKERS ( 11 Jun 2019 06:28 )  7.920 ng/mL / x     / 97 U/L / x     / 6.2 ng/mL  CARDIAC MARKERS ( 10 Osmani 2019 06:39 )  11.800 ng/mL / x     / 277 U/L / x     / 26.9 ng/mL  CARDIAC MARKERS ( 09 Jun 2019 15:46 )  22.100 ng/mL / x     / 519 U/L / x     / 65.1 ng/mL  CARDIAC MARKERS ( 09 Jun 2019 08:45 )  25.600 ng/mL / x     / 643 U/L / x     / 95.1 ng/mL        Review of Systems	      Objective     Physical Examination      heart s1s2  lung dec BS    Pertinent Lab findings & Imaging      Fransico:  NO   Adequate UO     I&O's Detail    10 Osmani 2019 07:01  -  11 Jun 2019 07:00  --------------------------------------------------------  IN:    pantoprazole Infusion: 230 mL    Solution: 100 mL  Total IN: 330 mL    OUT:    Voided: 150 mL  Total OUT: 150 mL    Total NET: 180 mL               Discussed with:     Cultures:	        Radiology

## 2019-06-11 NOTE — PROGRESS NOTE ADULT - SUBJECTIVE AND OBJECTIVE BOX
INTERVAL HPI/OVERNIGHT EVENTS:  pt seen and examined  reports some improvement from yesterday  c/o nausea wo vomiting  intermittent abd pain  no bm   tolerating clears  per rn no s/s overt gib  drop in hgb noted    MEDICATIONS  (STANDING):  acetaminophen   Tablet .. 650 milliGRAM(s) Oral every 6 hours  ALBUTerol    0.083% 2.5 milliGRAM(s) Nebulizer every 8 hours  atorvastatin 20 milliGRAM(s) Oral at bedtime  chlorhexidine 2% Cloths 1 Application(s) Topical <User Schedule>  diltiazem    milliGRAM(s) Oral daily  metoprolol succinate  milliGRAM(s) Oral two times a day  pantoprazole Infusion 8 mG/Hr (10 mL/Hr) IV Continuous <Continuous>    MEDICATIONS  (PRN):  HYDROmorphone  Injectable 1 milliGRAM(s) IV Push every 4 hours PRN Severe Pain (7 - 10)  oxyCODONE    IR 5 milliGRAM(s) Oral every 4 hours PRN Moderate Pain (4 - 6)      Allergies    No Known Allergies    Intolerances        Review of Systems:    General:  No wt loss, fevers, chills, night sweats, fatigue   Eyes:  Good vision, no reported pain  ENT:  No sore throat, pain, runny nose, dysphagia  CV:  No pain, palpitations, hypo/hypertension  Resp:  No dyspnea, cough, tachypnea, wheezing  GI:  +pain, +nausea, No vomiting, No diarrhea, No constipation, No weight loss, No fever, No pruritis, No rectal bleeding, No melena, No dysphagia  :  No pain, bleeding, incontinence, nocturia  Muscle:  No pain, weakness  Neuro:  No weakness, tingling, memory problems  Psych:  No fatigue, insomnia, mood problems, depression  Endocrine:  No polyuria, polydypsia, cold/heat intolerance  Heme:  No petechiae, ecchymosis, easy bruisability  Skin:  No rash, tattoos, scars, edema      Vital Signs Last 24 Hrs  T(C): 36.6 (11 Jun 2019 07:29), Max: 36.9 (11 Jun 2019 05:10)  T(F): 97.8 (11 Jun 2019 07:29), Max: 98.4 (11 Jun 2019 05:10)  HR: 68 (11 Jun 2019 07:29) (67 - 81)  BP: 133/79 (11 Jun 2019 07:29) (122/75 - 167/98)  BP(mean): --  RR: 18 (11 Jun 2019 07:29) (16 - 18)  SpO2: 99% (11 Jun 2019 07:29) (93% - 99%)    PHYSICAL EXAM:    Constitutional: lying in bed  HEENT: ncat  Neck: No LAD  Respiratory:  dec bs  Cardiovascular: S1 and S2, RRR  Gastrointestinal: soft mild generalized ttp no guarding mild dt  Extremities: No peripheral edema  Vascular: 2+ peripheral pulses  Neurological: Awake alert responds appropriately  Skin: No rashes        LABS:                        8.5    11.13 )-----------( 120      ( 11 Jun 2019 06:28 )             26.6     06-11    147<H>  |  116<H>  |  47<H>  ----------------------------<  108<H>  4.1   |  25  |  1.30    Ca    7.9<L>      11 Jun 2019 06:28  Phos  3.1     06-10  Mg     2.3     06-10    TPro  6.2  /  Alb  3.1<L>  /  TBili  1.5<H>  /  DBili  x   /  AST  80<H>  /  ALT  31  /  AlkPhos  57  06-10          RADIOLOGY & ADDITIONAL TESTS:

## 2019-06-11 NOTE — PROGRESS NOTE ADULT - PROBLEM SELECTOR PLAN 5
hold coumadin  monitor electrolytes closely K>4 Mg >2  Consider metoprolol IVP if BP permits  Cardiology following

## 2019-06-11 NOTE — PROGRESS NOTE ADULT - PROBLEM SELECTOR PLAN 1
Continue PPI drip for now, reassess s/p EGD tomorrow  C/o abdminal pain. CT abdomen negative for any acute findings   GI Dr Lassiter following  Tranfusion to hbg >9 as per cardio recs (given NSTEMI)  - transfuse 1 additional unti today (4th since admission), 20mg IV lasix after completion of unit  Hold coumadin, s/p vit K, Kcentra

## 2019-06-11 NOTE — PROGRESS NOTE ADULT - SUBJECTIVE AND OBJECTIVE BOX
Patient is a 84y old  Female who presents with a chief complaint of Melena, weakness, GIB (10 Osmani 2019 09:06)      FROM ADMISSION H+P:   HPI:  83 yo female w/ PMH of afib on coumadin, CVA and hx of aneurysm repair, HTN, HLD, mitral valve prolapse, PVD s/p stents x 3 (RLE), rheumatoid arthritis, cataracts of the L eye comes to the ER with complaints of bloody stool. As per patient since yesterday she started having adbominal pain with "black stool" and coffee ground emesis. She denies any recent chest pain, SOB. Patient came to the ER noted to have Hbg of 4.9. Given Kcentra, Vit K, tranfusion.CTA done no aneurysm leakage. Evaluated by ICU warranting ICU admission. (08 Jun 2019 15:07)      ----  INTERVAL HPI/OVERNIGHT EVENTS: Pt seen and evaluated at the bedside. No acute overnight events occurred. Pt c/o R shoulder discomfort and limited AROM but this has been ongoing and not an acute complaints today. Denies blood in stool. Denies hematuria. Denies other complaitnsa t this time. Admits mild weakness.     PRE-OP: exercise tolerance >4.0 mets [  ] YES   [ x ] NO  ; active tobacco use [  ] YES   [ x ] NO  ;  known h/o YVONNE [  ] YES   [ x ] NO  ; presence of obesity  [  ] YES   [ x ] NO  ;  presence of alcohol misuse [  ] YES   [ x ] NO  ; presence of illicit drug use [  ] YES   [ x ] NO  ; personal history of complications from anesthesia [  ] YES   [ x ] NO      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  Osteoarthritis  Afib  PVD (peripheral vascular disease)  Neuropathy: (Right lower leg)  H/O cerebral aneurysm repair: brain clips  CVA (cerebral vascular accident): (&quot;Mini-stroke&quot;,1990&#x27;s)  Rheumatoid arthritis  Hyperlipidemia  Hypertension  S/P cataract surgery: (Left eye)  Elective surgery: (&quot;Twisted bowel&quot;, 2014)  Elective surgery: (Exision of cyst on liver, 1985)  S/P ORIF (open reduction internal fixation) fracture: Left hip fx (2012) &amp; R hip fx (2013)  H/O cerebral aneurysm repair: Brain clips (1978(  Renal stone: Cysto stent placement 10/1/2014  PVD (peripheral vascular disease): s/p RLE bypass x 3, most recent 3/2012 Right external iliac to PT bypass w/ PTFE (2012)  S/P FRED (total abdominal hysterectomy): (1987, Hx of &quot;ovarian cancer?&quot;)      FAMILY HISTORY:  No pertinent family history in first degree relatives      Allergies    No Known Allergies    Intolerances        ----  REVIEW OF SYSTEMS:  CONSTITUTIONAL: denies fever, chills, admits fatigue, weakness  CARDIOVASCULAR: denies chest pain, chest pressure, palpitations  RESPIRATORY: denies shortness of breath, sputum production  GASTROINTESTINAL: admits nausea, denies vomiting, diarrhea, admits some epigastric abd pain but this is improving today  NEUROLOGICAL: denies numbness, headache, focal weakness  MUSCULOSKELETAL: denies new joint pain, muscle aches - as per HPI  HEMATOLOGIC: denies gross bleeding, bruising  LYMPHATICS: denies enlarged lymph nodes, extremity swelling  PSYCHIATRIC: denies recent changes in anxiety, depression  ENDOCRINOLOGIC: denies sweating, cold or heat intolerance    ----  PHYSICAL EXAM:  GENERAL: patient appears pale, weak, no acute distress, chronically ill appearing, deconditioned  ENMT: oropharynx clear without erythema, moist mucous membranes  NECK: thin, soft, no thyromegaly noted  LUNGS: good air entry bilaterally, clear to auscultation, symmetric breath sounds, no wheezing or rhonchi appreciated  HEART: soft S1/S2, regular rate and rhythm  GASTROINTESTINAL: abdomen is soft, tender in epigastrium, no distension, hypoactive bowel sounds, no palpable masses  INTEGUMENT: pale skin, appropriate for ethnicity, appears well perfused, no jaundice noted  MUSCULOSKELETAL: no clubbing or cyanosis, no obvious deformity  NEUROLOGIC: awake, alert, oriented x3, good muscle tone in 4 extremities, no obvious sensory deficits  HEME/LYMPH: no palpable supraclavicular nodules, no obvious ecchymosis     T(C): 36.4 (06-11-19 @ 15:21), Max: 36.9 (06-11-19 @ 05:10)  HR: 77 (06-11-19 @ 15:21) (68 - 86)  BP: 145/84 (06-11-19 @ 15:21) (129/74 - 167/98)  RR: 18 (06-11-19 @ 15:21) (16 - 18)  SpO2: 100% (06-11-19 @ 15:21) (93% - 100%)  Wt(kg): --    ----  I&O's Summary    10 Osmani 2019 07:01  -  11 Jun 2019 07:00  --------------------------------------------------------  IN: 330 mL / OUT: 150 mL / NET: 180 mL    11 Jun 2019 07:01  -  11 Jun 2019 18:42  --------------------------------------------------------  IN: 607 mL / OUT: 1100 mL / NET: -493 mL        LABS:                        8.5    11.13 )-----------( 120      ( 11 Jun 2019 06:28 )             26.6     06-11    147<H>  |  116<H>  |  47<H>  ----------------------------<  108<H>  4.1   |  25  |  1.30    Ca    7.9<L>      11 Jun 2019 06:28  Phos  3.1     06-10  Mg     2.3     06-10    TPro  6.2  /  Alb  3.1<L>  /  TBili  1.5<H>  /  DBili  x   /  AST  80<H>  /  ALT  31  /  AlkPhos  57  06-10        CAPILLARY BLOOD GLUCOSE          06-09 @ 18:15   No growth to date.  --  --  06-08 @ 16:36   No growth to date.  --  Blood Culture PCR            ----  Personally reviewed:  Vital sign trends: [ x ] yes    [  ] no     [  ] n/a  Laboratory results: [ x ] yes    [  ] no     [  ] n/a  Radiology results: [ x ] yes    [  ] no     [  ] n/a  Culture results: [x ] yes    [  ] no     [  ] n/a  Consultant recommendations: [ x ] yes    [  ] no     [  ] n/a

## 2019-06-11 NOTE — PROGRESS NOTE ADULT - ASSESSMENT
85 yo female w/ PMH of afib on coumadin, CVA and hx of aneurysm repair, HTN, HLD, mitral valve prolapse, PVD s/p stents x 3 (RLE), rheumatoid arthritis, cataracts presented c/o abdominal pain with melanotic stools and severe anemia likely secondary to upper GIB,  found to have NSTEMI.

## 2019-06-11 NOTE — PROGRESS NOTE ADULT - SUBJECTIVE AND OBJECTIVE BOX
CARITO CONSTANTINO is a 84yFemale , patient examined and chart reviewed.    INTERVAL HPI/ OVERNIGHT EVENTS:   Feeling better, No events. Afebrile.    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  Osteoarthritis  Afib  PVD (peripheral vascular disease)  Neuropathy: (Right lower leg)  H/O cerebral aneurysm repair: brain clips  CVA (cerebral vascular accident): (&quot;Mini-stroke&quot;,1990&#x27;s)  Rheumatoid arthritis  Hyperlipidemia  Hypertension  S/P cataract surgery: (Left eye)  Elective surgery: (&quot;Twisted bowel&quot;, 2014)  Elective surgery: (Exision of cyst on liver, 1985)  S/P ORIF (open reduction internal fixation) fracture: Left hip fx (2012) &amp; R hip fx (2013)  H/O cerebral aneurysm repair: Brain clips (1978(  Renal stone: Cysto stent placement 10/1/2014  PVD (peripheral vascular disease): s/p RLE bypass x 3, most recent 3/2012 Right external iliac to PT bypass w/ PTFE (2012)  S/P FRED (total abdominal hysterectomy): (1987, Hx of &quot;ovarian cancer?&quot;)      For details regarding the patient's social history, family history, and other miscellaneous elements, please refer the initial infectious diseases consultation and/or the admitting history and physical examination for this admission    ROS:  CONSTITUTIONAL:  Negative fever or chills  EYES:  Negative  blurry vision or double vision  CARDIOVASCULAR:  Negative for chest pain or palpitations  RESPIRATORY:  Negative for cough, wheezing, or SOB   GASTROINTESTINAL:  Negative for nausea, vomiting, diarrhea, constipation, or abdominal pain  GENITOURINARY:  Negative frequency, urgency or dysuria  NEUROLOGIC:  No headache, confusion, dizziness, lightheadedness  All other systems were reviewed and are negative       Current inpatient medications :    ANTIBIOTICS/RELEVANT:    acetaminophen   Tablet .. 650 milliGRAM(s) Oral every 6 hours  ALBUTerol    0.083% 2.5 milliGRAM(s) Nebulizer every 8 hours  atorvastatin 20 milliGRAM(s) Oral at bedtime  chlorhexidine 2% Cloths 1 Application(s) Topical <User Schedule>  diltiazem    milliGRAM(s) Oral daily  HYDROmorphone  Injectable 1 milliGRAM(s) IV Push every 4 hours PRN  metoprolol succinate  milliGRAM(s) Oral two times a day  oxyCODONE    IR 5 milliGRAM(s) Oral every 4 hours PRN  pantoprazole Infusion 8 mG/Hr IV Continuous <Continuous>  sucralfate 1 Gram(s) Oral two times a day      Objective:    06-10 @ 07:01  -  06-11 @ 07:00  --------------------------------------------------------  IN: 330 mL / OUT: 150 mL / NET: 180 mL    06-11 @ 07:01  -  06-12 @ 00:26  --------------------------------------------------------  IN: 749 mL / OUT: 2100 mL / NET: -1351 mL      T(C): 36.6 (06-11-19 @ 19:20), Max: 36.9 (06-11-19 @ 05:10)  HR: 80 (06-11-19 @ 19:34) (68 - 86)  BP: 169/93 (06-11-19 @ 19:20) (128/76 - 169/93)  RR: 18 (06-11-19 @ 19:20) (16 - 18)  SpO2: 91% (06-11-19 @ 19:34) (91% - 100%)      Physical Exam:  General: in no acute distress  Eyes: sclera anicteric, pupils equal and reactive to light  ENMT: buccal mucosa moist, pharynx not injected  Neck: supple, trachea midline  Lungs: clear, no wheeze/rhonchi  Cardiovascular: regular rate and rhythm, S1 S2  Abdomen: soft, nontender, no organomegaly present, bowel sounds normal  Neurological: alert and oriented x3, Cranial Nerves II-XII grossly intact  Skin: no increased ecchymosis/petechiae/purpura  Lymph Nodes: no palpable cervical/supraclavicular lymph nodes enlargements  Extremities: no cyanosis/clubbing/edema      LABS:                          8.5    11.13 )-----------( 120      ( 11 Jun 2019 06:28 )             26.6     06-11    147<H>  |  116<H>  |  47<H>  ----------------------------<  108<H>  4.1   |  25  |  1.30    Ca    7.9<L>      11 Jun 2019 06:28  Phos  3.1     06-10  Mg     2.3     06-10    TPro  6.2  /  Alb  3.1<L>  /  TBili  1.5<H>  /  DBili  x   /  AST  80<H>  /  ALT  31  /  AlkPhos  57  06-10    Troponin I, Serum (06.11.19 @ 06:28)    Troponin I, Serum: 7.920      MICROBIOLOGY:    Culture - Blood (collected 09 Jun 2019 18:15)  Source: .Blood Blood-Venous  Preliminary Report (10 Osmani 2019 19:00):    No growth to date.    Culture - Blood (collected 09 Jun 2019 18:15)  Source: .Blood Blood-Venous  Preliminary Report (10 Osmani 2019 19:00):    No growth to date.    RADIOLOGY & ADDITIONAL STUDIES:  EXAM:  CT ANGIO CHEST (W)AW IC                          EXAM:  CT ANGIO ABD PELV (W)AW IC                            PROCEDURE DATE:  06/08/2019          INTERPRETATION:  CTA CHEST/ABDOMEN/PELVIS:     CLINICAL INFORMATION: Pain with abdominal aortic aneurysm. Rule out   rupture    COMPARISON: CT abdomen and pelvis of 1/13/2019 and older study of   7/21/2018..    PROCEDURE:  Using multislice helical CT, 2.5 mm sections were obtained   from the thoracic inlet through the ischial tuberosities withthe   administration of intravenous contrast. Oral contrast was not   administered. 95 cc of Omnipaque 350 were administered. MIP sequences are   also performed. Preliminary noncontrast imaging was also performed.    Coronal and sagittal reformationswere performed by  CT technologist and   made available for review.    FINDINGS:  The visualized structures of the lower neck are unremarkable.   The visualized aorta is of normal course and caliber. The heart is not   enlarged. There is no evidence of pericardial effusion. There is   atherosclerotic calcification of the thoracic aorta and its branches.   There is tracheobronchial calcification. There is coronary artery   calcification.    There is no evidence of axillary, hilar, or mediastinal lymphadenopathy.    No focal lung consolidations identified. No evidence of pleural effusion   or pneumothorax. No pulmonary nodules identified. There is extensive   centrilobular emphysema. There is scarring at the lung bases with   probable honeycombing at the right lung base      The liver is of normal contour. there is no evidence of mass. The   gallbladder is remarkable for multiple calcified gallstones No evidence   of intra or extrahepatic biliary dilatation.     The pancreas, spleen, and adrenal glands are grossly unremarkable. There   is no evidence of hydronephrosis or perinephric stranding. No evidence of    nephrolithiasis.The bladder is unremarkable. There is nonobstructive   right lower pole renal calculus measuring 7 mm. There isno   hydronephrosis.    No evidence of lymphadenopathy utilizing CT size criteria. The aorta   demonstrates saccular aneurysm of the mid aorta just below the level of   the renal arteries with a configuration similar to that seen on the   previous study. There is atherosclerotic calcification of the abdominal   aorta and its branches. A stent is seen on the right at the level of the   external iliac artery with graft extending to the groin.    There is no evidence of bowel obstruction. There is no evidence of   pneumoperitoneum or free fluid. There is diverticulosis without   diverticulitis. Surgical clips are seen in the left pelvis. Surgical   sutures are again seen in the right lower quadrant and in the small bowel    The visualized osseous structures demonstrate osteopenia and degenerative   changes of the spine. There is bilateral hip ORIF.    IMPRESSION:  No significant change in the appearance of infrarenal   abdominal aortic aneurysm without evidence of leakage.  Small calcified gallstones are again noted  Nonobstructive right lower pole renal calculus is again seen in  Postsurgical changes with surgical clips in the left pelvis and right   lower quadrant small bowel sutures.  Right external iliac stent with graft extending into the right groin.   There is no flow seen within the stent/graft similar to the prior   contrast-enhanced study suggesting occlusion.    Assessment :   83 yo female w/ PMH of afib on coumadin, CVA and hx of aneurysm repair, HTN, HLD, mitral valve prolapse, PVD s/p stents x 3 (RLE), rheumatoid arthritis,  PUD presents with UGIB complicated by NSTEMI. Sp ICU stay and transfusion and now stable. Blood culture with CNS single set is skin contaminant. Repeat blood cultures ngtd.  No clinical evidence for Infectious process.  Overall stable.    Plan :   Monitor off antibiotics  Trend temps and cbc  GI and cardio following  For EGD per GI  Stable from ID standpoint   No active ID issues   Will sign off  Pls reconsult if needed      Continue with present regiment.  Appropriate use of antibiotics and adverse effects reviewed.      I have discussed the above plan of care with patient/ family in detail. They expressed understanding of the  treatment plan . Risks, benefits and alternatives discussed in detail. I have asked if they have any questions or concerns and appropriately addressed them to the best of my ability .    > 35 minutes were spent in direct patient care reviewing notes, medications ,labs data/ imaging , discussion with multidisciplinary team.    Thank you for allowing me to participate in care of your patient .    Cynthia Jaimes MD  Infectious Disease  [phone]

## 2019-06-11 NOTE — PROGRESS NOTE ADULT - PROBLEM SELECTOR PLAN 2
no liver/gb pathology on ct  possibly ischemia induced  trend labs, avoid hepatotoxins  monitor for need of ruq sono pending clinical course

## 2019-06-11 NOTE — PROGRESS NOTE ADULT - PROBLEM SELECTOR PLAN 2
I personally reviewed the patient's labs: CBC, BMP, coagulation profile  I personally reviewed the patient's EKG and my interpretation is: ?lateral depressions  I personally reviewed the patient's CXR and my interpretation is: clear lungs, sharp costophrenic angles  The patient's RCRI score is: 3  The patient is considered high risk for low risk procedure  There are no absolute contraindications to this planned procedure

## 2019-06-11 NOTE — PROGRESS NOTE ADULT - ATTENDING COMMENTS
The tx plan was discussed with the patient in detail. The patient's questions and concerns were addressed to the best of my ability. The patient is in agreement with the plan detailed above. The patient demonstrated adequate understanding of the plan and the counseling which I have provided.     EGD tomorrow. Cardio cleared. See my michelle-operative risk stratificaiton as documented above.

## 2019-06-11 NOTE — PROGRESS NOTE ADULT - PROBLEM SELECTOR PLAN 1
cta a/p neg for acute  drop in hgb wo evidence of active gib per nursing, transfusion ordered  monitor cbc, transfuse prn  cont ppi, carafate bid, anti emetics prn  monitor stool color  cont clears as tolerated  monitor exam, prn pain control  for possible egd tomorrow if cleared by cardiology and medicine, npo p mn for possible procedure, will follow cta a/p neg for acute gi pathology  drop in hgb wo evidence of active gib per nursing, transfusion ordered  monitor cbc, transfuse prn  cont ppi, carafate bid, anti emetics prn  monitor stool color  cont clears as tolerated  monitor exam, prn pain control  for possible egd tomorrow if cleared by cardiology and medicine, npo p mn for possible procedure, will follow cta a/p neg for acute gi pathology  drop in hgb wo evidence of active gib per nursing, transfusion ordered  monitor cbc, transfuse prn  cont ppi gtt until after egd given todays labs  cont carafate bid, anti emetics prn  monitor stool color  cont clears as tolerated  monitor exam, prn pain control  for possible egd tomorrow if cleared by cardiology and medicine, npo p mn for possible procedure, will follow

## 2019-06-11 NOTE — PROGRESS NOTE ADULT - PROBLEM SELECTOR PLAN 1
ID eval noted - am WBC pending -   GI bleed - serial Hgb - GI following - PPI - monitor VS and HD  HFpEF and NSTEMI - cardio following, cont cardio recs, cvs regimen, BP control  COPD - Emphysema - NEBS tid - I junior - monitor vs and Sat - keep sat > 88 pct  frail and weak elderly  assist with ADL  oral hygiene  supportive medical regimen and care and monitoring  discussed plan of care with the patient  ct scan and TTE reviewed.

## 2019-06-11 NOTE — PROGRESS NOTE ADULT - ASSESSMENT
84f PAF on coumadin, HFPEF (most recent ef normal on a limited study, performed 4/2018).  She has a history of a CVA and hx of a clipped cerebral aneurysm.  She has a history of HTN, HLD, mitral valve prolapse, PAD s/p bypass/stents, redo bypass, and a degree of residual non revascularizable disease, with associated sxs.  She does not clearly have a hx of cad.  She had nuclear stress testing in 2014.  She was found to have an inferolateral defect ultimately deemed artifact, though the description could conceivably have represented ischemia.    She presents to the ED brought c/o some combination of coffee grounds emesis, possibly hematemesis, and melanotic stool since last night.  Though she initially felt better, she began to deteriorate at home and was brought to the er.  She was found to be pale/ashen, ill appearing, tachycardic.  EKG was ischemic appearing.  She has been found to be severely anemic, with evidence of reduced end organ perfusion with an elevated lactate.      NSTEMI  - There was evidence of ischemia on EKG  - Cardiac enzymes have been elevated with troponins peaking at 25.6 and steadily trended down to 7.9 today  - Ischemia is on the basis of demand from severe anemia   - Volume and blood are appropriate to treat the ischemia at this time, and it remains inappropriate to add antiplatelet therapy at this time as she has a GI bleed and likely still actively bleeding as evidenced by her drop in H/H  - Need to trend H/H and transfuse to keep hgb >9  - Had long 38 beat run of NSVT noted previously but no further, which could have been in the setting of her NSTEMI  - Unable to start any antiplatelet in setting of profound anemia from GIB  - Continue BB.  Increase as tolerated by BP.  ContinueToprol to 100 BID to help control her anginal symptoms.  - Continue low dose statin for now.  May increase to 40 mg if LFT's continue  - TTE yesterday showed depressed EF 40-45% compared to her last TTE ion 2/2018 showing preserved LVF.  SWMA cannot be ruled out    GIB  - Presented with Hgb of 4.9, s/p PRBC x 3 units.    - Continue to trend H/H.  Her Hgb dropped to 8.5 from 10.  Transfuse to keep Hgb>9.  Please give Lasix 20 mg IV x 1 post transfusion  - GI following    Afib  - She presented with therapeutic inr, was reversed with kcentra and vit k in the setting of bleeding  - She is currently in NSR  - Continue CCB and BB  - Hold AC for now    HFrEF  - No evidence of volume overload  - TTE in Feb. 2018 showed preserved LVF with no significant valvular diseases.   - TTE now showing EF 40-45% but unable to r/o SWMA.  This change in LVF can be a result of her NSTEMI  - Lactate trended down (1.6 <--3.4)    Her troponins trended down nicely to 7.9 from peak of 25 with no evidence of anginal symptoms.  Her CP is felt to be a referred pain from her epigastrium which could likely be from esophagitis.  She has no further ventricular arrhythmias on tele.  She has no evidence of volume overload or significant valvular pathology.  She is considered optimized for a low non-cardiac endoscopic procedure.    Isabel Quintero [facility]  Cardiology 84f PAF on coumadin, HFPEF (most recent ef normal on a limited study, performed 4/2018).  She has a history of a CVA and hx of a clipped cerebral aneurysm.  She has a history of HTN, HLD, mitral valve prolapse, PAD s/p bypass/stents, redo bypass, and a degree of residual non revascularizable disease, with associated sxs.  She does not clearly have a hx of cad.  She had nuclear stress testing in 2014.  She was found to have an inferolateral defect ultimately deemed artifact, though the description could conceivably have represented ischemia.    She presents to the ED brought c/o some combination of coffee grounds emesis, possibly hematemesis, and melanotic stool since last night.  Though she initially felt better, she began to deteriorate at home and was brought to the er.  She was found to be pale/ashen, ill appearing, tachycardic.  EKG was ischemic appearing.  She has been found to be severely anemic, with evidence of reduced end organ perfusion with an elevated lactate.      NSTEMI  - There was evidence of ischemia on EKG  - Cardiac enzymes have been elevated with troponins peaking at 25.6 and steadily trended down to 7.9 today  - Ischemia is on the basis of demand from severe anemia   - Volume and blood are appropriate to treat the ischemia at this time, and it remains inappropriate to add antiplatelet therapy at this time as she has a GI bleed and likely still actively bleeding as evidenced by her drop in H/H  - Need to trend H/H and transfuse to keep hgb >9  - Had long 38 beat run of NSVT noted previously but no further, which could have been in the setting of her NSTEMI  - Unable to start any antiplatelet in setting of profound anemia from GIB  - Continue BB.  Increase as tolerated by BP.  ContinueToprol to 100 BID to help control her anginal symptoms.  - Continue low dose statin for now.  May increase to 40 mg if LFT's continue  - TTE yesterday showed depressed EF 40-45% compared to her last TTE ion 2/2018 showing preserved LVF.  SWMA cannot be ruled out    GIB  - Presented with Hgb of 4.9, s/p PRBC x 3 units.    - Continue to trend H/H.  Her Hgb dropped to 8.5 from 10.  Transfuse to keep Hgb>9.  Please give Lasix 20 mg IV x 1 post transfusion  - GI following    Afib  - She presented with therapeutic inr, was reversed with kcentra and vit k in the setting of bleeding  - She is currently in NSR  - Continue CCB and BB  - Hold AC for now    HFrEF  - No evidence of volume overload  - TTE in Feb. 2018 showed preserved LVF with no significant valvular diseases.   - TTE now showing EF 40-45% but unable to r/o SWMA.  This change in LVF can be a result of her NSTEMI  - Lactate trended down (1.6 <--3.4)    Her troponins trended down nicely to 7.9 from peak of 25 with no evidence of anginal symptoms.  Her CP is felt to be a referred pain from her epigastrium, though may have been cardiac. Her LV function is in the 40-45% range. She has no further ventricular arrhythmias on tele.  She has no evidence of volume overload or significant valvular pathology.  She is considered optimized for a low non-cardiac endoscopic procedure.    Isabel Quintero [facility]  Cardiology 84f PAF on coumadin, HFPEF (most recent ef normal on a limited study, performed 4/2018).  She has a history of a CVA and hx of a clipped cerebral aneurysm.  She has a history of HTN, HLD, mitral valve prolapse, PAD s/p bypass/stents, redo bypass, and a degree of residual non revascularizable disease, with associated sxs.  She does not clearly have a hx of cad.  She had nuclear stress testing in 2014.  She was found to have an inferolateral defect ultimately deemed artifact, though the description could conceivably have represented ischemia.    She presents to the ED brought c/o some combination of coffee grounds emesis, possibly hematemesis, and melanotic stool since last night.  Though she initially felt better, she began to deteriorate at home and was brought to the er.  She was found to be pale/ashen, ill appearing, tachycardic.  EKG was ischemic appearing.  She has been found to be severely anemic, with evidence of reduced end organ perfusion with an elevated lactate.      NSTEMI  - There was evidence of ischemia on EKG  - Cardiac enzymes have been elevated with troponins peaking at 25.6 and steadily trended down to 7.9 today  - Ischemia is on the basis of demand from severe anemia   - Volume and blood are appropriate to treat the ischemia at this time, and it remains inappropriate to add antiplatelet therapy at this time as she has a GI bleed and likely still actively bleeding as evidenced by her drop in H/H  - Need to trend H/H and transfuse to keep hgb >9  - Had long 38 beat run of NSVT noted previously but no further, which could have been in the setting of her NSTEMI  - Unable to start any antiplatelet in setting of profound anemia from GIB  - Continue BB.  Increase as tolerated by BP.  ContinueToprol to 100 BID to help control her anginal symptoms.  - Continue low dose statin for now.  May increase to 40 mg if LFT's continue  - TTE yesterday showed depressed EF 40-45% compared to her last TTE ion 2/2018 showing preserved LVF.  SWMA cannot be ruled out    GIB  - Presented with Hgb of 4.9, s/p PRBC x 3 units.    - Continue to trend H/H.  Her Hgb dropped to 8.5 from 10.  Transfuse to keep Hgb>9.  Please give Lasix 20 mg IV x 1 post transfusion  - GI following    Afib  - She presented with therapeutic inr, was reversed with kcentra and vit k in the setting of bleeding  - She is currently in NSR  - Continue CCB and BB  - Hold AC for now    HFrEF  - TTE in Feb. 2018 showed preserved LVF with no significant valvular diseases.   - TTE now showing EF 40-45% but unable to r/o SWMA.  This change in LVF can be a result of her NSTEMI  - Lactate trended down (1.6 <--3.4)  - She appears overloaded on clinical exam as evidenced by her rales which could be due to PRBC administration yesterday despite being given Lasix after    Her troponins trended down nicely to 7.9 from peak of 25 with no evidence of anginal symptoms.  Her CP is felt to be a referred pain from her epigastrium, though may have been cardiac. Her LV function is in the 40-45% range. She has no further ventricular arrhythmias on tele.  She has no evidence of volume overload or significant valvular pathology.  She is considered optimized for a low non-cardiac endoscopic procedure.    Isabel Quintero DNP  Cardiology

## 2019-06-12 ENCOUNTER — TRANSCRIPTION ENCOUNTER (OUTPATIENT)
Age: 84
End: 2019-06-12

## 2019-06-12 LAB
ALBUMIN SERPL ELPH-MCNC: 3 G/DL — LOW (ref 3.3–5)
ALP SERPL-CCNC: 61 U/L — SIGNIFICANT CHANGE UP (ref 40–120)
ALT FLD-CCNC: 24 U/L — SIGNIFICANT CHANGE UP (ref 12–78)
ANION GAP SERPL CALC-SCNC: 9 MMOL/L — SIGNIFICANT CHANGE UP (ref 5–17)
AST SERPL-CCNC: 27 U/L — SIGNIFICANT CHANGE UP (ref 15–37)
BILIRUB DIRECT SERPL-MCNC: 0.3 MG/DL — HIGH (ref 0.05–0.2)
BILIRUB INDIRECT FLD-MCNC: 1.2 MG/DL — HIGH (ref 0.2–1)
BILIRUB SERPL-MCNC: 1.5 MG/DL — HIGH (ref 0.2–1.2)
BUN SERPL-MCNC: 28 MG/DL — HIGH (ref 7–23)
CALCIUM SERPL-MCNC: 8 MG/DL — LOW (ref 8.5–10.1)
CHLORIDE SERPL-SCNC: 112 MMOL/L — HIGH (ref 96–108)
CO2 SERPL-SCNC: 25 MMOL/L — SIGNIFICANT CHANGE UP (ref 22–31)
CREAT SERPL-MCNC: 1 MG/DL — SIGNIFICANT CHANGE UP (ref 0.5–1.3)
GLUCOSE SERPL-MCNC: 111 MG/DL — HIGH (ref 70–99)
HCT VFR BLD CALC: 33 % — LOW (ref 34.5–45)
HGB BLD-MCNC: 10.7 G/DL — LOW (ref 11.5–15.5)
INR BLD: 1.12 RATIO — SIGNIFICANT CHANGE UP (ref 0.88–1.16)
MAGNESIUM SERPL-MCNC: 1.9 MG/DL — SIGNIFICANT CHANGE UP (ref 1.6–2.6)
MCHC RBC-ENTMCNC: 29.4 PG — SIGNIFICANT CHANGE UP (ref 27–34)
MCHC RBC-ENTMCNC: 32.4 GM/DL — SIGNIFICANT CHANGE UP (ref 32–36)
MCV RBC AUTO: 90.7 FL — SIGNIFICANT CHANGE UP (ref 80–100)
NRBC # BLD: 0 /100 WBCS — SIGNIFICANT CHANGE UP (ref 0–0)
PLATELET # BLD AUTO: 144 K/UL — LOW (ref 150–400)
POTASSIUM SERPL-MCNC: 3.1 MMOL/L — LOW (ref 3.5–5.3)
POTASSIUM SERPL-SCNC: 3.1 MMOL/L — LOW (ref 3.5–5.3)
PROT SERPL-MCNC: 5.9 G/DL — LOW (ref 6–8.3)
PROTHROM AB SERPL-ACNC: 12.8 SEC — SIGNIFICANT CHANGE UP (ref 10–12.9)
RBC # BLD: 3.64 M/UL — LOW (ref 3.8–5.2)
RBC # FLD: 17.2 % — HIGH (ref 10.3–14.5)
SODIUM SERPL-SCNC: 146 MMOL/L — HIGH (ref 135–145)
WBC # BLD: 9.4 K/UL — SIGNIFICANT CHANGE UP (ref 3.8–10.5)
WBC # FLD AUTO: 9.4 K/UL — SIGNIFICANT CHANGE UP (ref 3.8–10.5)

## 2019-06-12 PROCEDURE — 99233 SBSQ HOSP IP/OBS HIGH 50: CPT | Mod: GC

## 2019-06-12 PROCEDURE — 99232 SBSQ HOSP IP/OBS MODERATE 35: CPT

## 2019-06-12 PROCEDURE — 71045 X-RAY EXAM CHEST 1 VIEW: CPT | Mod: 26

## 2019-06-12 RX ORDER — POTASSIUM CHLORIDE 20 MEQ
10 PACKET (EA) ORAL
Refills: 0 | Status: COMPLETED | OUTPATIENT
Start: 2019-06-12 | End: 2019-06-12

## 2019-06-12 RX ORDER — PANTOPRAZOLE SODIUM 20 MG/1
40 TABLET, DELAYED RELEASE ORAL
Refills: 0 | Status: DISCONTINUED | OUTPATIENT
Start: 2019-06-12 | End: 2019-06-14

## 2019-06-12 RX ORDER — FUROSEMIDE 40 MG
20 TABLET ORAL ONCE
Refills: 0 | Status: COMPLETED | OUTPATIENT
Start: 2019-06-12 | End: 2019-06-12

## 2019-06-12 RX ADMIN — OXYCODONE HYDROCHLORIDE 5 MILLIGRAM(S): 5 TABLET ORAL at 21:22

## 2019-06-12 RX ADMIN — CHLORHEXIDINE GLUCONATE 1 APPLICATION(S): 213 SOLUTION TOPICAL at 05:06

## 2019-06-12 RX ADMIN — Medication 20 MILLIGRAM(S): at 07:53

## 2019-06-12 RX ADMIN — Medication 1 GRAM(S): at 17:23

## 2019-06-12 RX ADMIN — OXYCODONE HYDROCHLORIDE 5 MILLIGRAM(S): 5 TABLET ORAL at 21:40

## 2019-06-12 RX ADMIN — Medication 100 MILLIGRAM(S): at 05:06

## 2019-06-12 RX ADMIN — Medication 650 MILLIGRAM(S): at 13:23

## 2019-06-12 RX ADMIN — Medication 100 MILLIEQUIVALENT(S): at 08:16

## 2019-06-12 RX ADMIN — Medication 650 MILLIGRAM(S): at 05:30

## 2019-06-12 RX ADMIN — ATORVASTATIN CALCIUM 20 MILLIGRAM(S): 80 TABLET, FILM COATED ORAL at 21:23

## 2019-06-12 RX ADMIN — Medication 100 MILLIEQUIVALENT(S): at 10:51

## 2019-06-12 RX ADMIN — Medication 650 MILLIGRAM(S): at 17:23

## 2019-06-12 RX ADMIN — Medication 100 MILLIEQUIVALENT(S): at 13:24

## 2019-06-12 RX ADMIN — ALBUTEROL 2.5 MILLIGRAM(S): 90 AEROSOL, METERED ORAL at 07:41

## 2019-06-12 RX ADMIN — Medication 120 MILLIGRAM(S): at 05:06

## 2019-06-12 RX ADMIN — Medication 650 MILLIGRAM(S): at 05:05

## 2019-06-12 RX ADMIN — ALBUTEROL 2.5 MILLIGRAM(S): 90 AEROSOL, METERED ORAL at 15:27

## 2019-06-12 RX ADMIN — HYDROMORPHONE HYDROCHLORIDE 1 MILLIGRAM(S): 2 INJECTION INTRAMUSCULAR; INTRAVENOUS; SUBCUTANEOUS at 08:15

## 2019-06-12 RX ADMIN — PANTOPRAZOLE SODIUM 40 MILLIGRAM(S): 20 TABLET, DELAYED RELEASE ORAL at 17:23

## 2019-06-12 RX ADMIN — HYDROMORPHONE HYDROCHLORIDE 1 MILLIGRAM(S): 2 INJECTION INTRAMUSCULAR; INTRAVENOUS; SUBCUTANEOUS at 08:30

## 2019-06-12 RX ADMIN — Medication 100 MILLIGRAM(S): at 17:23

## 2019-06-12 NOTE — PROGRESS NOTE ADULT - SUBJECTIVE AND OBJECTIVE BOX
Patient is a 84y old  Female who presents with a chief complaint of Melena, weakness, GIB (11 Jun 2019 18:42)      INTERVAL HPI/OVERNIGHT EVENTS: Patient seen and examined at bedside. Patient is non verbal     MEDICATIONS  (STANDING):  acetaminophen   Tablet .. 650 milliGRAM(s) Oral every 6 hours  ALBUTerol    0.083% 2.5 milliGRAM(s) Nebulizer every 8 hours  atorvastatin 20 milliGRAM(s) Oral at bedtime  chlorhexidine 2% Cloths 1 Application(s) Topical <User Schedule>  diltiazem    milliGRAM(s) Oral daily  metoprolol succinate  milliGRAM(s) Oral two times a day  pantoprazole  Injectable 40 milliGRAM(s) IV Push two times a day  sucralfate 1 Gram(s) Oral two times a day    MEDICATIONS  (PRN):  HYDROmorphone  Injectable 1 milliGRAM(s) IV Push every 4 hours PRN Severe Pain (7 - 10)  oxyCODONE    IR 5 milliGRAM(s) Oral every 4 hours PRN Moderate Pain (4 - 6)      Allergies    No Known Allergies    Intolerances        REVIEW OF SYSTEMS: Unable to obtain.     Vital Signs Last 24 Hrs  T(C): 36.6 (12 Jun 2019 11:59), Max: 36.8 (12 Jun 2019 00:31)  T(F): 97.8 (12 Jun 2019 11:59), Max: 98.3 (12 Jun 2019 00:31)  HR: 80 (12 Jun 2019 11:59) (77 - 84)  BP: 127/77 (12 Jun 2019 11:59) (127/77 - 188/94)  BP(mean): --  RR: 17 (12 Jun 2019 11:59) (16 - 18)  SpO2: 100% (12 Jun 2019 11:59) (91% - 100%)    PHYSICAL EXAM:  GENERAL: patient appears pale, weak, no acute distress, chronically ill appearing, deconditioned  ENMT: oropharynx clear without erythema, moist mucous membranes  NECK: thin, soft, no thyromegaly noted  LUNGS: good air entry bilaterally, clear to auscultation, symmetric breath sounds, no wheezing or rhonchi appreciated  HEART: soft S1/S2, regular rate and rhythm  GASTROINTESTINAL: abdomen is soft, tender in epigastrium, no distension, hypoactive bowel sounds, no palpable masses  INTEGUMENT: pale skin, appropriate for ethnicity, appears well perfused, no jaundice noted  MUSCULOSKELETAL: no clubbing or cyanosis, no obvious deformity  NEUROLOGIC: awake, alert, oriented x3, good muscle tone in 4 extremities, no obvious sensory deficits  HEME/LYMPH: no palpable supraclavicular nodules, no obvious ecchymosis    LABS:                        10.7   9.40  )-----------( 144      ( 12 Jun 2019 05:53 )             33.0     CBC Full  -  ( 12 Jun 2019 05:53 )  WBC Count : 9.40 K/uL  Hemoglobin : 10.7 g/dL  Hematocrit : 33.0 %  Platelet Count - Automated : 144 K/uL  Mean Cell Volume : 90.7 fl  Mean Cell Hemoglobin : 29.4 pg  Mean Cell Hemoglobin Concentration : 32.4 gm/dL  Auto Neutrophil # : x  Auto Lymphocyte # : x  Auto Monocyte # : x  Auto Eosinophil # : x  Auto Basophil # : x  Auto Neutrophil % : x  Auto Lymphocyte % : x  Auto Monocyte % : x  Auto Eosinophil % : x  Auto Basophil % : x    12 Jun 2019 05:53    146    |  112    |  28     ----------------------------<  111    3.1     |  25     |  1.00     Ca    8.0        12 Jun 2019 05:53  Mg     1.9       12 Jun 2019 05:53    TPro  5.9    /  Alb  3.0    /  TBili  1.5    /  DBili  .30    /  AST  27     /  ALT  24     /  AlkPhos  61     12 Jun 2019 05:53    PT/INR - ( 12 Jun 2019 05:53 )   PT: 12.8 sec;   INR: 1.12 ratio             CAPILLARY BLOOD GLUCOSE            Culture - Blood (collected 06-09-19 @ 18:15)  Source: .Blood Blood-Venous  Preliminary Report (06-10-19 @ 19:00):    No growth to date.    Culture - Blood (collected 06-09-19 @ 18:15)  Source: .Blood Blood-Venous  Preliminary Report (06-10-19 @ 19:00):    No growth to date.    Culture - Blood (collected 06-08-19 @ 16:36)  Source: .Blood Blood-Peripheral  Gram Stain (06-09-19 @ 10:02):    Growth in aerobic bottle: Gram Positive Cocci in Clusters  Final Report (06-10-19 @ 11:50):    Growth in aerobic bottle: Coag Negative Staphylococcus    Single set isolate, possible contaminant. Contact    Microbiology if susceptibility testing clinically    indicated.    "Due to technical problems, Proteus sp. will Not be reported as part of    the BCID panel until further notice"    ***Blood Panel PCR results on this specimen are available    approximately 3 hours after the Gram stain result.***    Gram stain, PCR, and/or culture results may not always    correspond due to difference in methodologies.    ************************************************************    This PCR assay was performed using [employer].    The following targets are tested for: Enterococcus,    vancomycin resistant enterococci, Listeria monocytogenes,    coagulase negative staphylococci, S. aureus,    methicillin resistant S. aureus, Streptococcus agalactiae    (Group B), S. pneumoniae, S. pyogenes (Group A),    Acinetobacter baumannii, Enterobacter cloacae, E. coli,    Klebsiella oxytoca, K. pneumoniae, Proteus sp.,    Serratia marcescens, Haemophilus influenzae,    Neisseria meningitidis, Pseudomonas aeruginosa, Candida    albicans, C. glabrata, C krusei, C parapsilosis,    C. tropicalis and the KPC resistance gene.  Organism: Blood Culture PCR (06-10-19 @ 11:50)  Organism: Blood Culture PCR (06-10-19 @ 11:50)      -  Coagulase negative Staphylococcus: Detec      Method Type: PCR    Culture - Blood (collected 06-08-19 @ 16:36)  Source: .Blood Blood-Peripheral  Preliminary Report (06-09-19 @ 17:00):    No growth to date.        RADIOLOGY & ADDITIONAL TESTS:    Personally reviewed.     Consultant(s) Notes Reviewed:  [x] YES  [ ] NO Patient is a 84y old  Female who presents with a chief complaint of Melena, weakness, GIB (11 Jun 2019 18:42)      INTERVAL HPI/OVERNIGHT EVENTS: Patient seen and examined at bedside. Patient complaining of nagging abdominal pain only relieved with dilaudid.      MEDICATIONS  (STANDING):  acetaminophen   Tablet .. 650 milliGRAM(s) Oral every 6 hours  ALBUTerol    0.083% 2.5 milliGRAM(s) Nebulizer every 8 hours  atorvastatin 20 milliGRAM(s) Oral at bedtime  chlorhexidine 2% Cloths 1 Application(s) Topical <User Schedule>  diltiazem    milliGRAM(s) Oral daily  metoprolol succinate  milliGRAM(s) Oral two times a day  pantoprazole  Injectable 40 milliGRAM(s) IV Push two times a day  sucralfate 1 Gram(s) Oral two times a day    MEDICATIONS  (PRN):  HYDROmorphone  Injectable 1 milliGRAM(s) IV Push every 4 hours PRN Severe Pain (7 - 10)  oxyCODONE    IR 5 milliGRAM(s) Oral every 4 hours PRN Moderate Pain (4 - 6)      Allergies    No Known Allergies    Intolerances        REVIEW OF SYSTEMS:   Constitutional: denies fever, chills, diaphoresis   HEENT: denies blurry vision, difficulty hearing  Respiratory: denies SOB, CRABTREE, cough, sputum production, wheezing, hemoptysis  Cardiovascular: denies chest pain, palpitations, edema  Gastrointestinal: admits abdominal pain, black stools and nausea, denies vomiting, diarrhea, constipation  Genitourinary: denies dysuria, frequency, urgency, hematuria   Skin/Breast: denies rash, itching  Musculoskeletal: denies myalgias, joint swelling, muscle weakness  Neurologic: denies headache, weakness, dizziness, paresthesias, numbness/tingling    ROS negative except as noted above    Vital Signs Last 24 Hrs  T(C): 36.6 (12 Jun 2019 11:59), Max: 36.8 (12 Jun 2019 00:31)  T(F): 97.8 (12 Jun 2019 11:59), Max: 98.3 (12 Jun 2019 00:31)  HR: 80 (12 Jun 2019 11:59) (77 - 84)  BP: 127/77 (12 Jun 2019 11:59) (127/77 - 188/94)  BP(mean): --  RR: 17 (12 Jun 2019 11:59) (16 - 18)  SpO2: 100% (12 Jun 2019 11:59) (91% - 100%)    PHYSICAL EXAM:  GENERAL: patient appears pale, weak, no acute distress, chronically ill appearing, deconditioned  ENMT: oropharynx clear without erythema, moist mucous membranes  NECK: thin, soft, no thyromegaly noted  LUNGS: good air entry bilaterally, clear to auscultation, symmetric breath sounds, no wheezing or rhonchi appreciated  HEART: soft S1/S2, regular rate and rhythm  GASTROINTESTINAL: abdomen is soft, tender in epigastrium, no distension, hypoactive bowel sounds, no palpable masses  INTEGUMENT: pale skin, appropriate for ethnicity, appears well perfused, no jaundice noted  MUSCULOSKELETAL: no clubbing or cyanosis, no obvious deformity  NEUROLOGIC: awake, alert, oriented x3, good muscle tone in 4 extremities, no obvious sensory deficits  HEME/LYMPH: no palpable supraclavicular nodules, no obvious ecchymosis    LABS:                        10.7   9.40  )-----------( 144      ( 12 Jun 2019 05:53 )             33.0     CBC Full  -  ( 12 Jun 2019 05:53 )  WBC Count : 9.40 K/uL  Hemoglobin : 10.7 g/dL  Hematocrit : 33.0 %  Platelet Count - Automated : 144 K/uL  Mean Cell Volume : 90.7 fl  Mean Cell Hemoglobin : 29.4 pg  Mean Cell Hemoglobin Concentration : 32.4 gm/dL  Auto Neutrophil # : x  Auto Lymphocyte # : x  Auto Monocyte # : x  Auto Eosinophil # : x  Auto Basophil # : x  Auto Neutrophil % : x  Auto Lymphocyte % : x  Auto Monocyte % : x  Auto Eosinophil % : x  Auto Basophil % : x    12 Jun 2019 05:53    146    |  112    |  28     ----------------------------<  111    3.1     |  25     |  1.00     Ca    8.0        12 Jun 2019 05:53  Mg     1.9       12 Jun 2019 05:53    TPro  5.9    /  Alb  3.0    /  TBili  1.5    /  DBili  .30    /  AST  27     /  ALT  24     /  AlkPhos  61     12 Jun 2019 05:53    PT/INR - ( 12 Jun 2019 05:53 )   PT: 12.8 sec;   INR: 1.12 ratio             CAPILLARY BLOOD GLUCOSE            Culture - Blood (collected 06-09-19 @ 18:15)  Source: .Blood Blood-Venous  Preliminary Report (06-10-19 @ 19:00):    No growth to date.    Culture - Blood (collected 06-09-19 @ 18:15)  Source: .Blood Blood-Venous  Preliminary Report (06-10-19 @ 19:00):    No growth to date.    Culture - Blood (collected 06-08-19 @ 16:36)  Source: .Blood Blood-Peripheral  Gram Stain (06-09-19 @ 10:02):    Growth in aerobic bottle: Gram Positive Cocci in Clusters  Final Report (06-10-19 @ 11:50):    Growth in aerobic bottle: Coag Negative Staphylococcus    Single set isolate, possible contaminant. Contact    Microbiology if susceptibility testing clinically    indicated.    "Due to technical problems, Proteus sp. will Not be reported as part of    the BCID panel until further notice"    ***Blood Panel PCR results on this specimen are available    approximately 3 hours after the Gram stain result.***    Gram stain, PCR, and/or culture results may not always    correspond due to difference in methodologies.    ************************************************************    This PCR assay was performed using [employer].    The following targets are tested for: Enterococcus,    vancomycin resistant enterococci, Listeria monocytogenes,    coagulase negative staphylococci, S. aureus,    methicillin resistant S. aureus, Streptococcus agalactiae    (Group B), S. pneumoniae, S. pyogenes (Group A),    Acinetobacter baumannii, Enterobacter cloacae, E. coli,    Klebsiella oxytoca, K. pneumoniae, Proteus sp.,    Serratia marcescens, Haemophilus influenzae,    Neisseria meningitidis, Pseudomonas aeruginosa, Candida    albicans, C. glabrata, C krusei, C parapsilosis,    C. tropicalis and the KPC resistance gene.  Organism: Blood Culture PCR (06-10-19 @ 11:50)  Organism: Blood Culture PCR (06-10-19 @ 11:50)      -  Coagulase negative Staphylococcus: Detec      Method Type: PCR    Culture - Blood (collected 06-08-19 @ 16:36)  Source: .Blood Blood-Peripheral  Preliminary Report (06-09-19 @ 17:00):    No growth to date.        RADIOLOGY & ADDITIONAL TESTS:    Personally reviewed.     Consultant(s) Notes Reviewed:  [x] YES  [ ] NO

## 2019-06-12 NOTE — PROGRESS NOTE ADULT - SUBJECTIVE AND OBJECTIVE BOX
[facility] -- Dahiana Bañuelos, Evens, Toney, Truman Falcon Savella  Office # [phone]    Follow Up:    Profound Anemia, NSTEMI    Subjective/Observations: Awake and alert, on RA at 20 degree angle, no respiratory discomfort.  Denies CP or palpitations.  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  Osteoarthritis  Afib  PVD (peripheral vascular disease)  Neuropathy: (Right lower leg)  H/O cerebral aneurysm repair: brain clips  CVA (cerebral vascular accident): (&quot;Mini-stroke&quot;,1990&#x27;s)  Rheumatoid arthritis  Hyperlipidemia  Hypertension  S/P cataract surgery: (Left eye)  Elective surgery: (&quot;Twisted bowel&quot;, 2014)  Elective surgery: (Exision of cyst on liver, 1985)  S/P ORIF (open reduction internal fixation) fracture: Left hip fx (2012) &amp; R hip fx (2013)  H/O cerebral aneurysm repair: Brain clips (1978(  Renal stone: Cysto stent placement 10/1/2014  PVD (peripheral vascular disease): s/p RLE bypass x 3, most recent 3/2012 Right external iliac to PT bypass w/ PTFE (2012)  S/P FRED (total abdominal hysterectomy): (1987, Hx of &quot;ovarian cancer?&quot;)    MEDICATIONS  (STANDING):  acetaminophen   Tablet .. 650 milliGRAM(s) Oral every 6 hours  ALBUTerol    0.083% 2.5 milliGRAM(s) Nebulizer every 8 hours  atorvastatin 20 milliGRAM(s) Oral at bedtime  chlorhexidine 2% Cloths 1 Application(s) Topical <User Schedule>  diltiazem    milliGRAM(s) Oral daily  metoprolol succinate  milliGRAM(s) Oral two times a day  pantoprazole Infusion 8 mG/Hr (10 mL/Hr) IV Continuous <Continuous>  potassium chloride  10 mEq/100 mL IVPB 10 milliEquivalent(s) IV Intermittent every 1 hour  sucralfate 1 Gram(s) Oral two times a day    MEDICATIONS  (PRN):  HYDROmorphone  Injectable 1 milliGRAM(s) IV Push every 4 hours PRN Severe Pain (7 - 10)  oxyCODONE    IR 5 milliGRAM(s) Oral every 4 hours PRN Moderate Pain (4 - 6)      Allergies    No Known Allergies    Intolerances    Vital Signs Last 24 Hrs  T(C): 36.7 (12 Jun 2019 04:43), Max: 36.8 (11 Jun 2019 10:30)  T(F): 98 (12 Jun 2019 04:43), Max: 98.3 (12 Jun 2019 00:31)  HR: 80 (12 Jun 2019 04:43) (68 - 86)  BP: 142/96 (12 Jun 2019 06:45) (128/76 - 188/94)  BP(mean): --  RR: 18 (12 Jun 2019 04:43) (16 - 18)  SpO2: 98% (12 Jun 2019 04:43) (91% - 100%)    I&O's Summary    11 Jun 2019 07:01  -  12 Jun 2019 07:00  --------------------------------------------------------  IN: 829 mL / OUT: 2300 mL / NET: -1471 mL    PHYSICAL EXAM:  TELE: NSR  Constitutional: NAD, awake and alert, cachectic  HEENT: Moist Mucous Membranes, Anicteric  Pulmonary: Non-labored, breath sounds are clear bilaterally, No wheezing or rhonchi.  + rales L>R  Cardiovascular: Regular, S1 and S2, No murmurs, rubs, gallops or clicks  Gastrointestinal: Bowel Sounds present, soft, nontender.   Lymph: No peripheral edema. No lymphadenopathy.  Skin: No visible rashes or ulcers.  Very pale  Psych:  Mood & affect appropriate    LABS: All Labs Reviewed:                        10.7   9.40  )-----------( 144      ( 12 Jun 2019 05:53 )             33.0                         8.5    11.13 )-----------( 120      ( 11 Jun 2019 06:28 )             26.6                         10.0   17.58 )-----------( 142      ( 10 Osmani 2019 06:39 )             31.0     12 Jun 2019 05:53    146    |  112    |  28     ----------------------------<  111    3.1     |  25     |  1.00   11 Jun 2019 06:28    147    |  116    |  47     ----------------------------<  108    4.1     |  25     |  1.30   10 Osmani 2019 06:39    145    |  116    |  55     ----------------------------<  136    4.0     |  20     |  1.20     Ca    8.0        12 Jun 2019 05:53  Ca    7.9        11 Jun 2019 06:28  Ca    8.3        10 Osmani 2019 06:39  Phos  3.1       10 Osmani 2019 06:39  Phos  4.0       09 Jun 2019 08:45  Mg     1.9       12 Jun 2019 05:53  Mg     2.3       10 Osmani 2019 06:39  Mg     2.3       09 Jun 2019 08:45    TPro  5.9    /  Alb  3.0    /  TBili  1.5    /  DBili  .30    /  AST  27     /  ALT  24     /  AlkPhos  61     12 Jun 2019 05:53  TPro  6.2    /  Alb  3.1    /  TBili  1.5    /  DBili  x      /  AST  80     /  ALT  31     /  AlkPhos  57     10 Osmani 2019 06:39  TPro  5.9    /  Alb  2.9    /  TBili  1.2    /  DBili  x      /  AST  112    /  ALT  29     /  AlkPhos  51     09 Jun 2019 08:45    PT/INR - ( 12 Jun 2019 05:53 )   PT: 12.8 sec;   INR: 1.12 ratio      CARDIAC MARKERS ( 12 Jun 2019 05:53 )  5.110 ng/mL / x     / 74 U/L / x     / 4.1 ng/mL  CARDIAC MARKERS ( 11 Jun 2019 06:28 )  7.920 ng/mL / x     / 97 U/L / x     / 6.2 ng/mL  < from: CT Angio Chest w/ IV Cont (06.08.19 @ 13:02) >    EXAM:  CT ANGIO CHEST (W)AW IC                          EXAM:  CT ANGIO ABD PELV (W)AW IC                          PROCEDURE DATE:  06/08/2019        INTERPRETATION:  CTA CHEST/ABDOMEN/PELVIS:     CLINICAL INFORMATION: Pain with abdominal aortic aneurysm. Rule out   rupture    COMPARISON: CT abdomen and pelvis of 1/13/2019 and older study of   7/21/2018..    PROCEDURE:  Using multislice helical CT, 2.5 mm sections were obtained   from the thoracic inlet through the ischial tuberosities withthe   administration of intravenous contrast. Oral contrast was not   administered. 95 cc of Omnipaque 350 were administered. MIP sequences are   also performed. Preliminary noncontrast imaging was also performed.    Coronal and sagittal reformationswere performed by  CT technologist and   made available for review.    FINDINGS:  The visualized structures of the lower neck are unremarkable.   The visualized aorta is of normal course and caliber. The heart is not   enlarged. There is no evidence of pericardial effusion. There is   atherosclerotic calcification of the thoracic aorta and its branches.   There is tracheobronchial calcification. There is coronary artery   calcification.    There is no evidence of axillary, hilar, or mediastinal lymphadenopathy.    No focal lung consolidations identified. No evidence of pleural effusion   or pneumothorax. No pulmonary nodules identified. There is extensive   centrilobular emphysema. There is scarring at the lung bases with   probable honeycombing at the right lung base      The liver is of normal contour. there is no evidence of mass. The   gallbladder is remarkable for multiple calcified gallstones No evidence   of intra or extrahepatic biliary dilatation.     The pancreas, spleen, and adrenal glands are grossly unremarkable. There   is no evidence of hydronephrosis or perinephric stranding. No evidence of    nephrolithiasis.The bladder is unremarkable. There is nonobstructive   right lower pole renal calculus measuring 7 mm. There isno   hydronephrosis.    No evidence of lymphadenopathy utilizing CT size criteria. The aorta   demonstrates saccular aneurysm of the mid aorta just below the level of   the renal arteries with a configuration similar to that seen on the   previous study. There is atherosclerotic calcification of the abdominal   aorta and its branches. A stent is seen on the right at the level of the   external iliac artery with graft extending to the groin.    There is no evidence of bowel obstruction. There is no evidence of   pneumoperitoneum or free fluid. There is diverticulosis without   diverticulitis. Surgical clips are seen in the left pelvis. Surgical   sutures are again seen in the right lower quadrant and in the small bowel    The visualized osseous structures demonstrate osteopenia and degenerative   changes of the spine. There is bilateral hip ORIF.    IMPRESSION:  No significant change in the appearance of infrarenal   abdominal aortic aneurysm without evidence of leakage.  Small calcified gallstones are again noted  Nonobstructive right lower pole renal calculus is again seen in  Postsurgical changes with surgical clips in the left pelvis and right   lower quadrant small bowel sutures.  Right external iliac stent with graft extending into the right groin.   There is no flow seen within the stent/graft similar to the prior   contrast-enhanced study suggesting occlusion.    SHIMON TOSCANO M.D.,ATTENDING RADIOLOGIST  This document has been electronically signed. Jun 8 2019  1:22PM      < end of copied text >    < from: TTE Echo Doppler w/o Cont (02.05.18 @ 08:11) >     EXAM:  ECHO TTE W/O CON COMP W/DOPPLR         PROCEDURE DATE:  02/05/2018        INTERPRETATION:  INDICATION: respiratory failure  Referring M.D.:Lona  Blood Pressure 126/59        Weight (kg) :49     Height (cm):157       BSA (sq m): 1.47  Technician: JOJO    Dimensions:    LA 3.4       Normal Values: 2.0 - 4.0 cm    Ao 3.0        Normal Values: 2.0 - 3.8 cm  SEPTUM 0.8       Normal Values: 0.6 - 1.2 cm  PWT 0.8       Normal Values: 0.6 - 1.1 cm  LVIDd 3.4         Normal Values: 3.0 - 5.6 cm  LVIDs 2.3         Normal Values: 1.8 - 4.0 cm    OBSERVATIONS:  Technically difficult bedside ICU study. The patient is vented  Mitral Valve: Calcified mitral annulus and mitral valve leaflets with   normal opening. Mild mitral regurgitation.  Aortic Valve/Aorta: Calcified trileaflet aortic valve with normal opening.  Tricuspid Valve: Calcified trileaflet cusp at annulus with normally   opening valve leaflets. Mild tricuspid regurgitation.  Pulmonic Valve: The pulmonic valve is not well visualized. Probably   normal. Mild PI  Left Atrium: Small to moderate left atrial enlargement  Right Atrium: Normal  Left Ventricle: The endocardium is not well-visualized. There appears to   be grossly preserved left ventricular systolic function. There appears to   be concentric left ventricular hypertrophy. The EF is approximately 65%.  Right Ventricle: Normal right ventricular size and function. There is a   device wire noted in the right heart  Pericardium/Pleura: No pericardial effusion noted. Left pleural effusion   noted  Pulmonary/RV Pressure: The right ventricular systolic pressure is   estimated to be 41mmHg, assuming that the right atrial pressure is   estimated to be 8 mmHg. This is consistent with mild pulmonary   hypertension.  LV Diastolic Function: Stage II diastolic dysfunction    Conclusion: Likely difficult and limited study. Grossly preserved left   ventricular systolic function. Sensation diastolic dysfunction. Left   pleural effusion is noted.    KIET NIETO M.D., ATTENDING CARDIOLOGIST  This document has been electronically signed. Feb 6 2018  7:49PM      < end of copied text >

## 2019-06-12 NOTE — GOALS OF CARE CONVERSATION - PERSONAL ADVANCE DIRECTIVE - CONVERSATION DETAILS
met pt, confirmed hcp from last hospitalization, pt daughterDixie is hcp, alt hcp, Melyssa. HCP placed on chart.

## 2019-06-12 NOTE — PROGRESS NOTE ADULT - SUBJECTIVE AND OBJECTIVE BOX
INTERVAL HPI/OVERNIGHT EVENTS:  pt seen and examined  c/n nausea and generalized abd 'discomfort'  denies vomiting  no bm  tolerated clears yesterday  sp 1u prbc yesterday    MEDICATIONS  (STANDING):  acetaminophen   Tablet .. 650 milliGRAM(s) Oral every 6 hours  ALBUTerol    0.083% 2.5 milliGRAM(s) Nebulizer every 8 hours  atorvastatin 20 milliGRAM(s) Oral at bedtime  chlorhexidine 2% Cloths 1 Application(s) Topical <User Schedule>  diltiazem    milliGRAM(s) Oral daily  metoprolol succinate  milliGRAM(s) Oral two times a day  pantoprazole Infusion 8 mG/Hr (10 mL/Hr) IV Continuous <Continuous>  potassium chloride  10 mEq/100 mL IVPB 10 milliEquivalent(s) IV Intermittent every 1 hour  sucralfate 1 Gram(s) Oral two times a day    MEDICATIONS  (PRN):  HYDROmorphone  Injectable 1 milliGRAM(s) IV Push every 4 hours PRN Severe Pain (7 - 10)  oxyCODONE    IR 5 milliGRAM(s) Oral every 4 hours PRN Moderate Pain (4 - 6)      Allergies    No Known Allergies    Intolerances        Review of Systems:    General:  No wt loss, fevers, chills, night sweats, fatigue   Eyes:  Good vision, no reported pain  ENT:  No sore throat, pain, runny nose, dysphagia  CV:  No pain, palpitations, hypo/hypertension  Resp:  No dyspnea, cough, tachypnea, wheezing  GI:  No pain, No nausea, No vomiting, No diarrhea, No constipation, No weight loss, No fever, No pruritis, No rectal bleeding, No melena, No dysphagia  :  No pain, bleeding, incontinence, nocturia  Muscle:  No pain, weakness  Neuro:  No weakness, tingling, memory problems  Psych:  No fatigue, insomnia, mood problems, depression  Endocrine:  No polyuria, polydypsia, cold/heat intolerance  Heme:  No petechiae, ecchymosis, easy bruisability  Skin:  No rash, tattoos, scars, edema      Vital Signs Last 24 Hrs  T(C): 36.8 (12 Jun 2019 07:44), Max: 36.8 (12 Jun 2019 00:31)  T(F): 98.2 (12 Jun 2019 07:44), Max: 98.3 (12 Jun 2019 00:31)  HR: 80 (12 Jun 2019 07:44) (77 - 84)  BP: 142/96 (12 Jun 2019 06:45) (142/96 - 188/94)  BP(mean): --  RR: 16 (12 Jun 2019 07:44) (16 - 18)  SpO2: 99% (12 Jun 2019 07:44) (91% - 100%)    PHYSICAL EXAM:    Constitutional: lying in bed  HEENT: ncat  Neck: No LAD  Respiratory:  dec bs  Cardiovascular: S1 and S2, RRR  Gastrointestinal: soft mild epigastric ttp no guarding mild dt  Extremities: No edema  Vascular: 2+ peripheral pulses  Neurological: Awake alert responds appropriately  Skin: No rashes    LABS:                        10.7   9.40  )-----------( 144      ( 12 Jun 2019 05:53 )             33.0     06-12    146<H>  |  112<H>  |  28<H>  ----------------------------<  111<H>  3.1<L>   |  25  |  1.00    Ca    8.0<L>      12 Jun 2019 05:53  Mg     1.9     06-12    TPro  5.9<L>  /  Alb  3.0<L>  /  TBili  1.5<H>  /  DBili  .30<H>  /  AST  27  /  ALT  24  /  AlkPhos  61  06-12    PT/INR - ( 12 Jun 2019 05:53 )   PT: 12.8 sec;   INR: 1.12 ratio               RADIOLOGY & ADDITIONAL TESTS:

## 2019-06-12 NOTE — PROGRESS NOTE ADULT - ASSESSMENT
84f PAF on coumadin, HFPEF (most recent ef normal on a limited study, performed 4/2018).  She has a history of a CVA and hx of a clipped cerebral aneurysm.  She has a history of HTN, HLD, mitral valve prolapse, PAD s/p bypass/stents, redo bypass, and a degree of residual non revascularizable disease, with associated sxs.  She does not clearly have a hx of cad.  She had nuclear stress testing in 2014.  She was found to have an inferolateral defect ultimately deemed artifact, though the description could conceivably have represented ischemia.    She presents to the ED brought c/o some combination of coffee grounds emesis, possibly hematemesis, and melanotic stool since last night.  Though she initially felt better, she began to deteriorate at home and was brought to the er.  She was found to be pale/ashen, ill appearing, tachycardic.  EKG was ischemic appearing.  She has been found to be severely anemic, with evidence of reduced end organ perfusion with an elevated lactate.      She is s/p PRBC yesterday, 6/11, and given Lasix 20 mg IV x 1 post and another 20 mg IV last night.  This am, she had rales 2/3 up on exam.    NSTEMI  - There was evidence of ischemia on EKG  - Cardiac enzymes have been elevated with troponins peaking at 25.6 and steadily trended down to 7.9 today  - Ischemia is on the basis of demand from severe anemia   - Volume and blood are appropriate to treat the ischemia at this time, and it remains inappropriate to add antiplatelet therapy at this time as she has a GI bleed and likely still actively bleeding as evidenced by her drop in H/H.  However, would diurese after each transfusion  - Need to trend H/H and transfuse to keep hgb >9  - Had long 38 beat run of NSVT noted previously but no further to this date.  This could have been in the setting of her NSTEMI  - Unable to start any antiplatelet in setting of profound anemia from GIB  - Continue BB.  Increase as tolerated by BP.  Continue Toprol to 100 BID to help control her anginal symptoms.  - Increase statin as her LFT's have normalized  - TTE yesterday showed depressed EF 40-45% compared to her last TTE ion 2/2018 showing preserved LVF.  SWMA cannot be ruled out    GIB  - Presented with Hgb of 4.9, s/p PRBC x 3 units.    - Continue to trend H/H.  Her Hgb had dropped to 8.5 from 10, required PRBC's yesterday with appropriate response (10.7)  - No overt bleeding overnight.  Planned for EGD today.  However, would defer until her volume status is improved and we can ascertain that that risk of doing the procedure today do not outweigh benefits.  This was discussed with GI  - GI following    Afib  - She presented with therapeutic inr, was reversed with kcentra and vit k in the setting of bleeding  - She is currently in NSR  - Continue CCB and BB  - Hold AC for now    HFrEF  - No evidence of volume overload  - TTE in Feb. 2018 showed preserved LVF with no significant valvular diseases.   - TTE now showing EF 40-45% but unable to r/o SWMA.  This change in LVF can be a result of her NSTEMI  - Lactate trended down (1.6 <--3.4)    Her troponins trended down nicely to 5.1 from peak of 25 with no evidence of anginal symptoms.  Her CP recently is felt to be a referred pain from her epigastrium, though may have been cardiac. Her LV function is in the 40-45% range. She has no further ventricular arrhythmias on tele.  She has no evidence significant valvular pathology.  However, she is appears overloaded on clinical exam.  At this point, she is not optimized for endoscopic procedure.    Isabel Quintero [facility]  Cardiology 84f PAF on coumadin, HFPEF (most recent ef normal on a limited study, performed 4/2018).  She has a history of a CVA and hx of a clipped cerebral aneurysm.  She has a history of HTN, HLD, mitral valve prolapse, PAD s/p bypass/stents, redo bypass, and a degree of residual non revascularizable disease, with associated sxs.  She does not clearly have a hx of cad.  She had nuclear stress testing in 2014.  She was found to have an inferolateral defect ultimately deemed artifact, though the description could conceivably have represented ischemia.    She presents to the ED brought c/o some combination of coffee grounds emesis, possibly hematemesis, and melanotic stool since last night.  Though she initially felt better, she began to deteriorate at home and was brought to the er.  She was found to be pale/ashen, ill appearing, tachycardic.  EKG was ischemic appearing.  She has been found to be severely anemic, with evidence of reduced end organ perfusion with an elevated lactate.      She is s/p PRBC yesterday, 6/11, and given Lasix 20 mg IV x 1 post and another 20 mg IV last night.  This am, she had rales 2/3 up on exam.    NSTEMI  - There was evidence of ischemia on EKG  - Cardiac enzymes have been elevated with troponins peaking at 25.6 and steadily trended down to 7.9 today  - Ischemia is on the basis of demand from severe anemia   - Volume and blood are appropriate to treat the ischemia at this time, and it remains inappropriate to add antiplatelet therapy at this time as she has a GI bleed and likely still actively bleeding as evidenced by her drop in H/H.  However, would diurese after each transfusion  - Need to trend H/H and transfuse to keep hgb >9  - Had long 38 beat run of NSVT noted previously but no further to this date.  This could have been in the setting of her NSTEMI  - Unable to start any antiplatelet in setting of profound anemia from GIB  - Continue Toprol to 100 BID to help control her anginal symptoms.  - Increase statin as her LFT's have normalized  - TTE yesterday showed depressed EF 40-45% compared to her last TTE ion 2/2018 showing preserved LVF.  SWMA cannot be ruled out    GIB  - Presented with Hgb of 4.9, s/p PRBC x 3 units.    - Continue to trend H/H.  Her Hgb had dropped to 8.5 from 10, required PRBC's yesterday with appropriate response (10.7)  - No overt bleeding overnight.  Planned for EGD today.  However, would defer until her volume status is improved and we can ascertain that that risk of doing the procedure today do not outweigh benefits.  This was discussed with GI  - GI following    Afib  - She presented with therapeutic inr, was reversed with kcentra and vit k in the setting of bleeding  - She is currently in NSR  - Continue CCB and BB  - Hold AC for now    HFrEF  - No evidence of volume overload  - TTE in Feb. 2018 showed preserved LVF with no significant valvular diseases.   - TTE now showing EF 40-45% but unable to r/o SWMA.  This change in LVF can be a result of her NSTEMI  - Lactate trended down (1.6 <--3.4)    Her troponins trended down nicely to 5.1 from peak of 25 with no evidence of anginal symptoms.  Her CP recently is felt to be a referred pain from her epigastrium, though may have been cardiac. Her LV function is in the 40-45% range. She has no further ventricular arrhythmias on tele.  She has no evidence significant valvular pathology.  However, she is appears overloaded on clinical exam.  At this point, she is not optimized for endoscopic procedure.    Isabel Quintreo [facility]  Cardiology

## 2019-06-12 NOTE — PROGRESS NOTE ADULT - PROBLEM SELECTOR PLAN 1
cta a/p neg for acute gi pathology  no evidence of overt gib, sp transfusion yesterday  monitor cbc, transfuse prn  cont ppi bid, carafate bid, anti emetics prn  monitor stool color  cont fulls + supps as tolerated  monitor exam, prn pain control  pt not optimized for egd today per jose c cardiology, will reschedule for tomorrow, jose c pt and rn, npo p mn

## 2019-06-12 NOTE — PROGRESS NOTE ADULT - PROBLEM SELECTOR PLAN 1
planned for EGD  GI following  no objection from Pulmonary  PPI  off AC  on NEBS for COPD Emphysema  O2 support as needed  keep sat > 88 pct  assist with ADL  oral hygiene  skin care  monitor vs and HD and sat  serial hgb and labs  will follow  medical notes and follow ups noted

## 2019-06-12 NOTE — PROGRESS NOTE ADULT - SUBJECTIVE AND OBJECTIVE BOX
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  Osteoarthritis  Afib  PVD (peripheral vascular disease)  Neuropathy: (Right lower leg)  Gout  H/O cerebral aneurysm repair: brain clips  CVA (cerebral vascular accident): (&quot;Mini-stroke&quot;,1990&#x27;s)  Rheumatoid arthritis  Asthma  Hyperlipidemia  Hypertension  S/P cataract surgery: (Left eye)  Elective surgery: (&quot;Twisted bowel&quot;, 2014)  Elective surgery: (Exision of cyst on liver, 1985)  S/P ORIF (open reduction internal fixation) fracture: Left hip fx (2012) &amp; R hip fx (2013)  H/O cerebral aneurysm repair: Brain clips (1978(  Renal stone: Cysto stent placement 10/1/2014  PVD (peripheral vascular disease): s/p RLE bypass x 3, most recent 3/2012 Right external iliac to PT bypass w/ PTFE (2012)  S/P FRED (total abdominal hysterectomy): (1987, Hx of &quot;ovarian cancer?&quot;)        Medication list         MEDICATIONS  (STANDING):  acetaminophen   Tablet .. 650 milliGRAM(s) Oral every 6 hours  ALBUTerol    0.083% 2.5 milliGRAM(s) Nebulizer every 8 hours  atorvastatin 20 milliGRAM(s) Oral at bedtime  chlorhexidine 2% Cloths 1 Application(s) Topical <User Schedule>  diltiazem    milliGRAM(s) Oral daily  metoprolol succinate  milliGRAM(s) Oral two times a day  pantoprazole Infusion 8 mG/Hr (10 mL/Hr) IV Continuous <Continuous>  potassium chloride  10 mEq/100 mL IVPB 10 milliEquivalent(s) IV Intermittent every 1 hour  sucralfate 1 Gram(s) Oral two times a day    MEDICATIONS  (PRN):  HYDROmorphone  Injectable 1 milliGRAM(s) IV Push every 4 hours PRN Severe Pain (7 - 10)  oxyCODONE    IR 5 milliGRAM(s) Oral every 4 hours PRN Moderate Pain (4 - 6)         Vitals log        ICU Vital Signs Last 24 Hrs  T(C): 36.7 (12 Jun 2019 04:43), Max: 36.8 (11 Jun 2019 10:30)  T(F): 98 (12 Jun 2019 04:43), Max: 98.3 (12 Jun 2019 00:31)  HR: 80 (12 Jun 2019 04:43) (68 - 86)  BP: 142/96 (12 Jun 2019 06:45) (128/76 - 188/94)  BP(mean): --  ABP: --  ABP(mean): --  RR: 18 (12 Jun 2019 04:43) (16 - 18)  SpO2: 98% (12 Jun 2019 04:43) (91% - 100%)           Input and Output:  I&O's Detail    11 Jun 2019 07:01  -  12 Jun 2019 07:00  --------------------------------------------------------  IN:    Oral Fluid: 350 mL    Packed Red Blood Cells: 279 mL    pantoprazole Infusion: 200 mL  Total IN: 829 mL    OUT:    Voided: 2300 mL  Total OUT: 2300 mL    Total NET: -1471 mL          Lab Data                        10.7   9.40  )-----------( 144      ( 12 Jun 2019 05:53 )             33.0     06-12    146<H>  |  112<H>  |  28<H>  ----------------------------<  111<H>  3.1<L>   |  25  |  1.00    Ca    8.0<L>      12 Jun 2019 05:53  Mg     1.9     06-12    TPro  5.9<L>  /  Alb  3.0<L>  /  TBili  1.5<H>  /  DBili  .30<H>  /  AST  27  /  ALT  24  /  AlkPhos  61  06-12      CARDIAC MARKERS ( 12 Jun 2019 05:53 )  5.110 ng/mL / x     / 74 U/L / x     / 4.1 ng/mL  CARDIAC MARKERS ( 11 Jun 2019 06:28 )  7.920 ng/mL / x     / 97 U/L / x     / 6.2 ng/mL        Review of Systems	      Objective     Physical Examination    heart s1s2  lung dec BS  abd soft      Pertinent Lab findings & Imaging      Fransico:  NO   Adequate UO     I&O's Detail    11 Jun 2019 07:01  -  12 Jun 2019 07:00  --------------------------------------------------------  IN:    Oral Fluid: 350 mL    Packed Red Blood Cells: 279 mL    pantoprazole Infusion: 200 mL  Total IN: 829 mL    OUT:    Voided: 2300 mL  Total OUT: 2300 mL    Total NET: -1471 mL               Discussed with:     Cultures:	        Radiology

## 2019-06-12 NOTE — PROGRESS NOTE ADULT - PROBLEM SELECTOR PLAN 1
Continue PPI drip for now, patient was not cleared by cardio for EGD, reassess tomorrow for EGD  C/o abdminal pain. CT abdomen negative for any acute findings   GI Dr Lassiter following  Tranfusion to hbg >9 as per cardio recs (given NSTEMI)  - s/p 4 units throughout hospital stay   Hold coumadin, s/p vit K, Kcentra PPI drip d/ambreen now, patient was not cleared by cardio for EGD, reassess tomorrow for EGD  C/o abdminal pain. CT abdomen negative for any acute findings   GI Dr Lassiter following  Tranfusion to hbg >9 as per cardio recs (given NSTEMI)  - s/p 4 units throughout hospital stay   Hold coumadin, s/p vit K, Kcentra

## 2019-06-12 NOTE — PROGRESS NOTE ADULT - ATTENDING COMMENTS
EGD was cancelled as per cardio for higher risk. TTE with new sys CHF, LVEF 40 % probably 2/2 to ACS.  Will follow win am

## 2019-06-13 LAB
ANION GAP SERPL CALC-SCNC: 7 MMOL/L — SIGNIFICANT CHANGE UP (ref 5–17)
BASOPHILS # BLD AUTO: 0.02 K/UL — SIGNIFICANT CHANGE UP (ref 0–0.2)
BASOPHILS NFR BLD AUTO: 0.2 % — SIGNIFICANT CHANGE UP (ref 0–2)
BUN SERPL-MCNC: 25 MG/DL — HIGH (ref 7–23)
CALCIUM SERPL-MCNC: 8.3 MG/DL — LOW (ref 8.5–10.1)
CHLORIDE SERPL-SCNC: 109 MMOL/L — HIGH (ref 96–108)
CO2 SERPL-SCNC: 29 MMOL/L — SIGNIFICANT CHANGE UP (ref 22–31)
CREAT SERPL-MCNC: 0.96 MG/DL — SIGNIFICANT CHANGE UP (ref 0.5–1.3)
CULTURE RESULTS: SIGNIFICANT CHANGE UP
EOSINOPHIL # BLD AUTO: 0.31 K/UL — SIGNIFICANT CHANGE UP (ref 0–0.5)
EOSINOPHIL NFR BLD AUTO: 2.9 % — SIGNIFICANT CHANGE UP (ref 0–6)
GLUCOSE SERPL-MCNC: 105 MG/DL — HIGH (ref 70–99)
HCT VFR BLD CALC: 34.5 % — SIGNIFICANT CHANGE UP (ref 34.5–45)
HGB BLD-MCNC: 11.3 G/DL — LOW (ref 11.5–15.5)
IMM GRANULOCYTES NFR BLD AUTO: 0.6 % — SIGNIFICANT CHANGE UP (ref 0–1.5)
LYMPHOCYTES # BLD AUTO: 1 K/UL — SIGNIFICANT CHANGE UP (ref 1–3.3)
LYMPHOCYTES # BLD AUTO: 9.5 % — LOW (ref 13–44)
MCHC RBC-ENTMCNC: 29.8 PG — SIGNIFICANT CHANGE UP (ref 27–34)
MCHC RBC-ENTMCNC: 32.8 GM/DL — SIGNIFICANT CHANGE UP (ref 32–36)
MCV RBC AUTO: 91 FL — SIGNIFICANT CHANGE UP (ref 80–100)
MONOCYTES # BLD AUTO: 1.03 K/UL — HIGH (ref 0–0.9)
MONOCYTES NFR BLD AUTO: 9.8 % — SIGNIFICANT CHANGE UP (ref 2–14)
NEUTROPHILS # BLD AUTO: 8.09 K/UL — HIGH (ref 1.8–7.4)
NEUTROPHILS NFR BLD AUTO: 77 % — SIGNIFICANT CHANGE UP (ref 43–77)
NRBC # BLD: 0 /100 WBCS — SIGNIFICANT CHANGE UP (ref 0–0)
PLATELET # BLD AUTO: 149 K/UL — LOW (ref 150–400)
POTASSIUM SERPL-MCNC: 3.4 MMOL/L — LOW (ref 3.5–5.3)
POTASSIUM SERPL-SCNC: 3.4 MMOL/L — LOW (ref 3.5–5.3)
RBC # BLD: 3.79 M/UL — LOW (ref 3.8–5.2)
RBC # FLD: 17.2 % — HIGH (ref 10.3–14.5)
SODIUM SERPL-SCNC: 145 MMOL/L — SIGNIFICANT CHANGE UP (ref 135–145)
SPECIMEN SOURCE: SIGNIFICANT CHANGE UP
WBC # BLD: 10.51 K/UL — HIGH (ref 3.8–10.5)
WBC # FLD AUTO: 10.51 K/UL — HIGH (ref 3.8–10.5)

## 2019-06-13 PROCEDURE — 99233 SBSQ HOSP IP/OBS HIGH 50: CPT | Mod: GC

## 2019-06-13 PROCEDURE — 99232 SBSQ HOSP IP/OBS MODERATE 35: CPT

## 2019-06-13 RX ORDER — MORPHINE SULFATE 50 MG/1
2 CAPSULE, EXTENDED RELEASE ORAL ONCE
Refills: 0 | Status: DISCONTINUED | OUTPATIENT
Start: 2019-06-13 | End: 2019-06-13

## 2019-06-13 RX ORDER — FLUCONAZOLE 150 MG/1
100 TABLET ORAL DAILY
Refills: 0 | Status: DISCONTINUED | OUTPATIENT
Start: 2019-06-13 | End: 2019-06-17

## 2019-06-13 RX ORDER — MORPHINE SULFATE 50 MG/1
1 CAPSULE, EXTENDED RELEASE ORAL EVERY 6 HOURS
Refills: 0 | Status: DISCONTINUED | OUTPATIENT
Start: 2019-06-13 | End: 2019-06-17

## 2019-06-13 RX ORDER — POTASSIUM CHLORIDE 20 MEQ
10 PACKET (EA) ORAL ONCE
Refills: 0 | Status: COMPLETED | OUTPATIENT
Start: 2019-06-13 | End: 2019-06-13

## 2019-06-13 RX ORDER — LISINOPRIL 2.5 MG/1
2.5 TABLET ORAL DAILY
Refills: 0 | Status: DISCONTINUED | OUTPATIENT
Start: 2019-06-13 | End: 2019-06-17

## 2019-06-13 RX ADMIN — FLUCONAZOLE 100 MILLIGRAM(S): 150 TABLET ORAL at 21:16

## 2019-06-13 RX ADMIN — CHLORHEXIDINE GLUCONATE 1 APPLICATION(S): 213 SOLUTION TOPICAL at 05:01

## 2019-06-13 RX ADMIN — Medication 100 MILLIGRAM(S): at 05:00

## 2019-06-13 RX ADMIN — Medication 100 MILLIEQUIVALENT(S): at 08:39

## 2019-06-13 RX ADMIN — PANTOPRAZOLE SODIUM 40 MILLIGRAM(S): 20 TABLET, DELAYED RELEASE ORAL at 05:00

## 2019-06-13 RX ADMIN — Medication 650 MILLIGRAM(S): at 23:34

## 2019-06-13 RX ADMIN — MORPHINE SULFATE 1 MILLIGRAM(S): 50 CAPSULE, EXTENDED RELEASE ORAL at 17:21

## 2019-06-13 RX ADMIN — MORPHINE SULFATE 1 MILLIGRAM(S): 50 CAPSULE, EXTENDED RELEASE ORAL at 23:38

## 2019-06-13 RX ADMIN — PANTOPRAZOLE SODIUM 40 MILLIGRAM(S): 20 TABLET, DELAYED RELEASE ORAL at 17:21

## 2019-06-13 RX ADMIN — ALBUTEROL 2.5 MILLIGRAM(S): 90 AEROSOL, METERED ORAL at 00:18

## 2019-06-13 RX ADMIN — ALBUTEROL 2.5 MILLIGRAM(S): 90 AEROSOL, METERED ORAL at 15:40

## 2019-06-13 RX ADMIN — OXYCODONE HYDROCHLORIDE 5 MILLIGRAM(S): 5 TABLET ORAL at 05:40

## 2019-06-13 RX ADMIN — MORPHINE SULFATE 2 MILLIGRAM(S): 50 CAPSULE, EXTENDED RELEASE ORAL at 09:40

## 2019-06-13 RX ADMIN — ALBUTEROL 2.5 MILLIGRAM(S): 90 AEROSOL, METERED ORAL at 07:31

## 2019-06-13 RX ADMIN — ATORVASTATIN CALCIUM 20 MILLIGRAM(S): 80 TABLET, FILM COATED ORAL at 21:16

## 2019-06-13 RX ADMIN — Medication 1 GRAM(S): at 17:21

## 2019-06-13 RX ADMIN — Medication 120 MILLIGRAM(S): at 05:00

## 2019-06-13 RX ADMIN — MORPHINE SULFATE 1 MILLIGRAM(S): 50 CAPSULE, EXTENDED RELEASE ORAL at 17:36

## 2019-06-13 RX ADMIN — ALBUTEROL 2.5 MILLIGRAM(S): 90 AEROSOL, METERED ORAL at 23:11

## 2019-06-13 RX ADMIN — OXYCODONE HYDROCHLORIDE 5 MILLIGRAM(S): 5 TABLET ORAL at 05:00

## 2019-06-13 RX ADMIN — Medication 650 MILLIGRAM(S): at 17:26

## 2019-06-13 RX ADMIN — LISINOPRIL 2.5 MILLIGRAM(S): 2.5 TABLET ORAL at 13:31

## 2019-06-13 RX ADMIN — MORPHINE SULFATE 2 MILLIGRAM(S): 50 CAPSULE, EXTENDED RELEASE ORAL at 09:25

## 2019-06-13 RX ADMIN — Medication 100 MILLIGRAM(S): at 17:21

## 2019-06-13 NOTE — PROGRESS NOTE ADULT - SUBJECTIVE AND OBJECTIVE BOX
Patient is a 84y old  Female who presents with a chief complaint of Melena, weakness, GIB (11 Jun 2019 18:42)      INTERVAL HPI/OVERNIGHT EVENTS: Patient seen and examined at bedside. Patient rports no acute complaints. Hypertensive today. Denies headache, nausea, vomiting, blurry vision, chest pain or palpitations    MEDICATIONS  (STANDING):  acetaminophen   Tablet .. 650 milliGRAM(s) Oral every 6 hours  ALBUTerol    0.083% 2.5 milliGRAM(s) Nebulizer every 8 hours  atorvastatin 20 milliGRAM(s) Oral at bedtime  chlorhexidine 2% Cloths 1 Application(s) Topical <User Schedule>  diltiazem    milliGRAM(s) Oral daily  lisinopril 2.5 milliGRAM(s) Oral daily  metoprolol succinate  milliGRAM(s) Oral two times a day  pantoprazole  Injectable 40 milliGRAM(s) IV Push two times a day  sucralfate 1 Gram(s) Oral two times a day    MEDICATIONS  (PRN):  HYDROmorphone  Injectable 1 milliGRAM(s) IV Push every 4 hours PRN Severe Pain (7 - 10)  oxyCODONE    IR 5 milliGRAM(s) Oral every 4 hours PRN Moderate Pain (4 - 6)      Allergies    No Known Allergies    Intolerances        REVIEW OF SYSTEMS:  CONSTITUTIONAL: No fever or chills  HEENT: No headache, no sore throat  RESPIRATORY: No cough, wheezing, or shortness of breath  CARDIOVASCULAR: No chest pain, palpitations, or leg swelling  GASTROINTESTINAL: No abd pain, nausea, vomiting, or diarrhea  GENITOURINARY: No dysuria, frequency, or hematuria  NEUROLOGICAL: No focal weakness or dizziness  MUSCULOSKELETAL: No myalgias     Vital Signs Last 24 Hrs  T(C): 36.8 (13 Jun 2019 12:06), Max: 37.1 (12 Jun 2019 20:44)  T(F): 98.2 (13 Jun 2019 12:06), Max: 98.8 (12 Jun 2019 20:44)  HR: 77 (13 Jun 2019 12:06) (68 - 82)  BP: 174/80 (13 Jun 2019 12:06) (135/74 - 180/95)  BP(mean): --  RR: 19 (13 Jun 2019 12:06) (18 - 19)  SpO2: 99% (13 Jun 2019 12:06) (97% - 100%)    PHYSICAL EXAM:  GENERAL: NAD  HEENT: EOMI, moist mucous membranes  CHEST/LUNG: CTA b/l, no rales, wheezes, or rhonchi  HEART: RRR, S1, S2  ABDOMEN: BS+, soft, nontender, nondistended  EXTREMITIES: No edema, cyanosis, or calf tenderness  NERVOUS SYSTEM: AA&Ox3    LABS:                        11.3   10.51 )-----------( 149      ( 13 Jun 2019 06:19 )             34.5     CBC Full  -  ( 13 Jun 2019 06:19 )  WBC Count : 10.51 K/uL  Hemoglobin : 11.3 g/dL  Hematocrit : 34.5 %  Platelet Count - Automated : 149 K/uL  Mean Cell Volume : 91.0 fl  Mean Cell Hemoglobin : 29.8 pg  Mean Cell Hemoglobin Concentration : 32.8 gm/dL  Auto Neutrophil # : 8.09 K/uL  Auto Lymphocyte # : 1.00 K/uL  Auto Monocyte # : 1.03 K/uL  Auto Eosinophil # : 0.31 K/uL  Auto Basophil # : 0.02 K/uL  Auto Neutrophil % : 77.0 %  Auto Lymphocyte % : 9.5 %  Auto Monocyte % : 9.8 %  Auto Eosinophil % : 2.9 %  Auto Basophil % : 0.2 %    13 Jun 2019 06:19    145    |  109    |  25     ----------------------------<  105    3.4     |  29     |  0.96     Ca    8.3        13 Jun 2019 06:19      PT/INR - ( 12 Jun 2019 05:53 )   PT: 12.8 sec;   INR: 1.12 ratio             CAPILLARY BLOOD GLUCOSE            Culture - Blood (collected 06-09-19 @ 18:15)  Source: .Blood Blood-Venous  Preliminary Report (06-10-19 @ 19:00):    No growth to date.    Culture - Blood (collected 06-09-19 @ 18:15)  Source: .Blood Blood-Venous  Preliminary Report (06-10-19 @ 19:00):    No growth to date.    Culture - Blood (collected 06-08-19 @ 16:36)  Source: .Blood Blood-Peripheral  Gram Stain (06-09-19 @ 10:02):    Growth in aerobic bottle: Gram Positive Cocci in Clusters  Final Report (06-10-19 @ 11:50):    Growth in aerobic bottle: Coag Negative Staphylococcus    Single set isolate, possible contaminant. Contact    Microbiology if susceptibility testing clinically    indicated.    "Due to technical problems, Proteus sp. will Not be reported as part of    the BCID panel until further notice"    ***Blood Panel PCR results on this specimen are available    approximately 3 hours after the Gram stain result.***    Gram stain, PCR, and/or culture results may not always    correspond due to difference in methodologies.    ************************************************************    This PCR assay was performed using [employer].    The following targets are tested for: Enterococcus,    vancomycin resistant enterococci, Listeria monocytogenes,    coagulase negative staphylococci, S. aureus,    methicillin resistant S. aureus, Streptococcus agalactiae    (Group B), S. pneumoniae, S. pyogenes (Group A),    Acinetobacter baumannii, Enterobacter cloacae, E. coli,    Klebsiella oxytoca, K. pneumoniae, Proteus sp.,    Serratia marcescens, Haemophilus influenzae,    Neisseria meningitidis, Pseudomonas aeruginosa, Candida    albicans, C. glabrata, C krusei, C parapsilosis,    C. tropicalis and the KPC resistance gene.  Organism: Blood Culture PCR (06-10-19 @ 11:50)  Organism: Blood Culture PCR (06-10-19 @ 11:50)      -  Coagulase negative Staphylococcus: Detec      Method Type: PCR    Culture - Blood (collected 06-08-19 @ 16:36)  Source: .Blood Blood-Peripheral  Preliminary Report (06-09-19 @ 17:00):    No growth to date.        RADIOLOGY & ADDITIONAL TESTS:    Personally reviewed.     Consultant(s) Notes Reviewed:  [x] YES  [ ] NO Patient is a 84y old  Female who presents with a chief complaint of Melena, weakness, GIB (11 Jun 2019 18:42)      INTERVAL HPI/OVERNIGHT EVENTS: Patient seen and examined at bedside. Patient c/o abd pain and says not relieved with am dose of oxy. Hypertensive today. Denies headache, nausea, vomiting, blurry vision, chest pain or palpitations    MEDICATIONS  (STANDING):  acetaminophen   Tablet .. 650 milliGRAM(s) Oral every 6 hours  ALBUTerol    0.083% 2.5 milliGRAM(s) Nebulizer every 8 hours  atorvastatin 20 milliGRAM(s) Oral at bedtime  chlorhexidine 2% Cloths 1 Application(s) Topical <User Schedule>  diltiazem    milliGRAM(s) Oral daily  lisinopril 2.5 milliGRAM(s) Oral daily  metoprolol succinate  milliGRAM(s) Oral two times a day  pantoprazole  Injectable 40 milliGRAM(s) IV Push two times a day  sucralfate 1 Gram(s) Oral two times a day    MEDICATIONS  (PRN):  HYDROmorphone  Injectable 1 milliGRAM(s) IV Push every 4 hours PRN Severe Pain (7 - 10)  oxyCODONE    IR 5 milliGRAM(s) Oral every 4 hours PRN Moderate Pain (4 - 6)      Allergies    No Known Allergies    Intolerances        REVIEW OF SYSTEMS:  CONSTITUTIONAL: No fever or chills  HEENT: No headache, no sore throat  RESPIRATORY: No cough, wheezing, or shortness of breath  CARDIOVASCULAR: No chest pain, palpitations, or leg swelling  GASTROINTESTINAL: No abd pain, nausea, vomiting, or diarrhea  GENITOURINARY: No dysuria, frequency, or hematuria  NEUROLOGICAL: No focal weakness or dizziness  MUSCULOSKELETAL: No myalgias     Vital Signs Last 24 Hrs  T(C): 36.8 (13 Jun 2019 12:06), Max: 37.1 (12 Jun 2019 20:44)  T(F): 98.2 (13 Jun 2019 12:06), Max: 98.8 (12 Jun 2019 20:44)  HR: 77 (13 Jun 2019 12:06) (68 - 82)  BP: 174/80 (13 Jun 2019 12:06) (135/74 - 180/95)  BP(mean): --  RR: 19 (13 Jun 2019 12:06) (18 - 19)  SpO2: 99% (13 Jun 2019 12:06) (97% - 100%)    PHYSICAL EXAM:  GENERAL: NAD  HEENT: EOMI, moist mucous membranes  CHEST/LUNG: CTA b/l, no rales, wheezes, or rhonchi  HEART: RRR, S1, S2  ABDOMEN: BS+, soft, nontender, nondistended  EXTREMITIES: No edema, cyanosis, or calf tenderness  NERVOUS SYSTEM: AA&Ox3    LABS:                        11.3   10.51 )-----------( 149      ( 13 Jun 2019 06:19 )             34.5     CBC Full  -  ( 13 Jun 2019 06:19 )  WBC Count : 10.51 K/uL  Hemoglobin : 11.3 g/dL  Hematocrit : 34.5 %  Platelet Count - Automated : 149 K/uL  Mean Cell Volume : 91.0 fl  Mean Cell Hemoglobin : 29.8 pg  Mean Cell Hemoglobin Concentration : 32.8 gm/dL  Auto Neutrophil # : 8.09 K/uL  Auto Lymphocyte # : 1.00 K/uL  Auto Monocyte # : 1.03 K/uL  Auto Eosinophil # : 0.31 K/uL  Auto Basophil # : 0.02 K/uL  Auto Neutrophil % : 77.0 %  Auto Lymphocyte % : 9.5 %  Auto Monocyte % : 9.8 %  Auto Eosinophil % : 2.9 %  Auto Basophil % : 0.2 %    13 Jun 2019 06:19    145    |  109    |  25     ----------------------------<  105    3.4     |  29     |  0.96     Ca    8.3        13 Jun 2019 06:19      PT/INR - ( 12 Jun 2019 05:53 )   PT: 12.8 sec;   INR: 1.12 ratio             CAPILLARY BLOOD GLUCOSE            Culture - Blood (collected 06-09-19 @ 18:15)  Source: .Blood Blood-Venous  Preliminary Report (06-10-19 @ 19:00):    No growth to date.    Culture - Blood (collected 06-09-19 @ 18:15)  Source: .Blood Blood-Venous  Preliminary Report (06-10-19 @ 19:00):    No growth to date.    Culture - Blood (collected 06-08-19 @ 16:36)  Source: .Blood Blood-Peripheral  Gram Stain (06-09-19 @ 10:02):    Growth in aerobic bottle: Gram Positive Cocci in Clusters  Final Report (06-10-19 @ 11:50):    Growth in aerobic bottle: Coag Negative Staphylococcus    Single set isolate, possible contaminant. Contact    Microbiology if susceptibility testing clinically    indicated.    "Due to technical problems, Proteus sp. will Not be reported as part of    the BCID panel until further notice"    ***Blood Panel PCR results on this specimen are available    approximately 3 hours after the Gram stain result.***    Gram stain, PCR, and/or culture results may not always    correspond due to difference in methodologies.    ************************************************************    This PCR assay was performed using [employer].    The following targets are tested for: Enterococcus,    vancomycin resistant enterococci, Listeria monocytogenes,    coagulase negative staphylococci, S. aureus,    methicillin resistant S. aureus, Streptococcus agalactiae    (Group B), S. pneumoniae, S. pyogenes (Group A),    Acinetobacter baumannii, Enterobacter cloacae, E. coli,    Klebsiella oxytoca, K. pneumoniae, Proteus sp.,    Serratia marcescens, Haemophilus influenzae,    Neisseria meningitidis, Pseudomonas aeruginosa, Candida    albicans, C. glabrata, C krusei, C parapsilosis,    C. tropicalis and the KPC resistance gene.  Organism: Blood Culture PCR (06-10-19 @ 11:50)  Organism: Blood Culture PCR (06-10-19 @ 11:50)      -  Coagulase negative Staphylococcus: Detec      Method Type: PCR    Culture - Blood (collected 06-08-19 @ 16:36)  Source: .Blood Blood-Peripheral  Preliminary Report (06-09-19 @ 17:00):    No growth to date.        RADIOLOGY & ADDITIONAL TESTS:    Personally reviewed.     Consultant(s) Notes Reviewed:  [x] YES  [ ] NO

## 2019-06-13 NOTE — CHART NOTE - NSCHARTNOTEFT_GEN_A_CORE
Assessment: Patient seen for malnutrition follow up. Chart reviewed, hospital course noted. For planned EGD later today. On clears/full liquids during entire hospitalization.    Pt alert and oriented during time of interview, c/o nausea and abdominal pain. States that appetite has not been great. Endorsed some constipation, last BM x4 days ago. Declined prune juice. No bowel regimen noted. Pt does not seem eager for diet advancement, states she does not have much interest in food at present.    Factors impacting intake: [ ] none [X] nausea  [ ] vomiting [ ] diarrhea [X] constipation  [ ]chewing problems [ ] swallowing issues  [X] other: persistent lack of appetite    Diet, Full Liquid:   Supplement Feeding Modality:  Oral  Ensure Clear Cans or Servings Per Day:  1       Frequency:  Three Times a day (06-12-19 @ 10:37)  Diet, NPO after Midnight:      NPO Start Date: 12-Jun-2019,   NPO Start Time: 23:59  Except Medications (06-12-19 @ 12:55)    Intake: variable, 50% of tray with much encouragement    Current Weight: 117.2 pounds (6/11)    Pertinent Medications: MEDICATIONS  (STANDING):  acetaminophen   Tablet .. 650 milliGRAM(s) Oral every 6 hours  ALBUTerol    0.083% 2.5 milliGRAM(s) Nebulizer every 8 hours  atorvastatin 20 milliGRAM(s) Oral at bedtime  chlorhexidine 2% Cloths 1 Application(s) Topical <User Schedule>  diltiazem    milliGRAM(s) Oral daily  lisinopril 2.5 milliGRAM(s) Oral daily  metoprolol succinate  milliGRAM(s) Oral two times a day  pantoprazole  Injectable 40 milliGRAM(s) IV Push two times a day  sucralfate 1 Gram(s) Oral two times a day    MEDICATIONS  (PRN):  HYDROmorphone  Injectable 1 milliGRAM(s) IV Push every 4 hours PRN Severe Pain (7 - 10)  oxyCODONE    IR 5 milliGRAM(s) Oral every 4 hours PRN Moderate Pain (4 - 6)    Pertinent Labs: 06-13 Na145 mmol/L Glu 105 mg/dL<H> K+ 3.4 mmol/L<L> Cr  0.96 mg/dL BUN 25 mg/dL<H> 06-10 Phos 3.1 mg/dL 06-12 Alb 3.0 g/dL<L>     CAPILLARY BLOOD GLUCOSE        Skin: stage 2 L ear, no edema    Estimated Needs:   [X] no change since previous assessment  [ ] recalculated:     Previous Nutrition Diagnosis:   [ ] Inadequate Energy Intake [ ]Inadequate Oral Intake [ ] Excessive Energy Intake   [ ] Underweight [ ] Increased Nutrient Needs [ ] Overweight/Obesity   [X] Altered GI Function [ ] Unintended Weight Loss [ ] Food & Nutrition Related Knowledge Deficit [X] Malnutrition (moderate, chronic)    Nutrition Diagnosis is [X] ongoing  [ ] resolved [ ] not applicable     New Nutrition Diagnosis: [X] not applicable       Interventions:   Recommend  [X] Change Diet To: Resume full liquid diet and advance as medically feasible to low fiber.   [X] Nutrition Supplement: Change Ensure Clear TID to Ensure Enlive TID for additional kcal/protein. Pending verification placed to GI PA.   [ ] Nutrition Support  [X] Other: Encourage po intake at all meals. Await results of EGD. Consider initiating bowel regimen.    Monitoring and Evaluation:   [X] PO intake [ x ] Tolerance to diet prescription [ x ] weights [ x ] labs[ x ] follow up per protocol  [X] other: RD to remain available.

## 2019-06-13 NOTE — PROGRESS NOTE ADULT - PROBLEM SELECTOR PLAN 1
PPI drip d/ambreen now, patient was not cleared by cardio for EGD, reassess tomorrow for EGD  C/o abdminal pain. CT abdomen negative for any acute findings   GI Dr Lassiter following  Tranfusion to hbg >9 as per cardio recs (given NSTEMI)  - s/p 4 units throughout hospital stay   Hold coumadin, s/p vit K, Kcentra  - For EGD today - The patient is considered high risk for low risk procedure  There are no absolute contraindications to this planned procedure.   - Pain control- morphine added to pain regimen Pt is for EGD today and is cleared by cardio.   H/H is stable.   C/o abdminal pain. CT abdomen negative for any acute findings   GI Dr Lassiter following  Tranfusion to hbg >9 as per cardio recs (given NSTEMI)  - s/p 4 units throughout hospital stay   Hold coumadin, s/p vit K, Kcentra  - For EGD today - The patient is considered high risk for low risk procedure  There are no absolute contraindications to this planned procedure.   - Pain control- morphine added to pain regimen

## 2019-06-13 NOTE — PROGRESS NOTE ADULT - SUBJECTIVE AND OBJECTIVE BOX
sp egd  candida esophagitis  gastritis  bile reflux    no stigmata of bleeding  ppi bid  monitor cbc  outpt capsule

## 2019-06-13 NOTE — PROGRESS NOTE ADULT - ASSESSMENT
83 yo female w/ PMH of afib on coumadin, CVA and hx of aneurysm repair, HTN, HLD, mitral valve prolapse, PVD s/p stents x 3 (RLE), rheumatoid arthritis, cataracts presented c/o abdominal pain with melanotic stools and severe anemia likely secondary to upper GIB,  found to have NSTEMI.

## 2019-06-13 NOTE — PROVIDER CONTACT NOTE (OTHER) - SITUATION
Dr. Mckya notified of  elevated BP readings.  Pt denies any c/o headache, lightheadedness or  dizzyness

## 2019-06-13 NOTE — PROGRESS NOTE ADULT - ATTENDING COMMENTS
Pt is for EGD today and is cleared by cardio.   H/H is stable. Has Ch pain and is on dilaudid and oxycodone.

## 2019-06-13 NOTE — PROGRESS NOTE ADULT - SUBJECTIVE AND OBJECTIVE BOX
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  Osteoarthritis  Afib  PVD (peripheral vascular disease)  Neuropathy: (Right lower leg)  Gout  H/O cerebral aneurysm repair: brain clips  CVA (cerebral vascular accident): (&quot;Mini-stroke&quot;,1990&#x27;s)  Rheumatoid arthritis  Asthma  Hyperlipidemia  Hypertension  S/P cataract surgery: (Left eye)  Elective surgery: (&quot;Twisted bowel&quot;, 2014)  Elective surgery: (Exision of cyst on liver, 1985)  S/P ORIF (open reduction internal fixation) fracture: Left hip fx (2012) &amp; R hip fx (2013)  H/O cerebral aneurysm repair: Brain clips (1978(  Renal stone: Cysto stent placement 10/1/2014  PVD (peripheral vascular disease): s/p RLE bypass x 3, most recent 3/2012 Right external iliac to PT bypass w/ PTFE (2012)  S/P FRED (total abdominal hysterectomy): (1987, Hx of &quot;ovarian cancer?&quot;)        Medication list         MEDICATIONS  (STANDING):  acetaminophen   Tablet .. 650 milliGRAM(s) Oral every 6 hours  ALBUTerol    0.083% 2.5 milliGRAM(s) Nebulizer every 8 hours  atorvastatin 20 milliGRAM(s) Oral at bedtime  chlorhexidine 2% Cloths 1 Application(s) Topical <User Schedule>  diltiazem    milliGRAM(s) Oral daily  metoprolol succinate  milliGRAM(s) Oral two times a day  pantoprazole  Injectable 40 milliGRAM(s) IV Push two times a day  sucralfate 1 Gram(s) Oral two times a day    MEDICATIONS  (PRN):  HYDROmorphone  Injectable 1 milliGRAM(s) IV Push every 4 hours PRN Severe Pain (7 - 10)  oxyCODONE    IR 5 milliGRAM(s) Oral every 4 hours PRN Moderate Pain (4 - 6)         Vitals log        ICU Vital Signs Last 24 Hrs  T(C): 36.9 (13 Jun 2019 05:08), Max: 37.1 (12 Jun 2019 20:44)  T(F): 98.5 (13 Jun 2019 05:08), Max: 98.8 (12 Jun 2019 20:44)  HR: 76 (13 Jun 2019 05:08) (68 - 82)  BP: 180/95 (13 Jun 2019 05:08) (127/77 - 180/95)  BP(mean): --  ABP: --  ABP(mean): --  RR: 18 (13 Jun 2019 05:08) (16 - 18)  SpO2: 100% (13 Jun 2019 05:08) (97% - 100%)           Input and Output:  I&O's Detail    11 Jun 2019 07:01  -  12 Jun 2019 07:00  --------------------------------------------------------  IN:    Oral Fluid: 350 mL    Packed Red Blood Cells: 279 mL    pantoprazole Infusion: 200 mL  Total IN: 829 mL    OUT:    Voided: 2300 mL  Total OUT: 2300 mL    Total NET: -1471 mL      12 Jun 2019 07:01  -  13 Jun 2019 06:13  --------------------------------------------------------  IN:  Total IN: 0 mL    OUT:    Voided: 900 mL  Total OUT: 900 mL    Total NET: -900 mL          Lab Data                        10.7   9.40  )-----------( 144      ( 12 Jun 2019 05:53 )             33.0     06-12    146<H>  |  112<H>  |  28<H>  ----------------------------<  111<H>  3.1<L>   |  25  |  1.00    Ca    8.0<L>      12 Jun 2019 05:53  Mg     1.9     06-12    TPro  5.9<L>  /  Alb  3.0<L>  /  TBili  1.5<H>  /  DBili  .30<H>  /  AST  27  /  ALT  24  /  AlkPhos  61  06-12      CARDIAC MARKERS ( 12 Jun 2019 05:53 )  5.110 ng/mL / x     / 74 U/L / x     / 4.1 ng/mL  CARDIAC MARKERS ( 11 Jun 2019 06:28 )  7.920 ng/mL / x     / 97 U/L / x     / 6.2 ng/mL        Review of Systems	      Objective     Physical Examination    heart s1s2  lung dec BS      Pertinent Lab findings & Imaging      Fransico:  NO   Adequate UO     I&O's Detail    11 Jun 2019 07:01  -  12 Jun 2019 07:00  --------------------------------------------------------  IN:    Oral Fluid: 350 mL    Packed Red Blood Cells: 279 mL    pantoprazole Infusion: 200 mL  Total IN: 829 mL    OUT:    Voided: 2300 mL  Total OUT: 2300 mL    Total NET: -1471 mL      12 Jun 2019 07:01  -  13 Jun 2019 06:13  --------------------------------------------------------  IN:  Total IN: 0 mL    OUT:    Voided: 900 mL  Total OUT: 900 mL    Total NET: -900 mL               Discussed with:     Cultures:	        Radiology

## 2019-06-13 NOTE — PROGRESS NOTE ADULT - SUBJECTIVE AND OBJECTIVE BOX
[facility] -- Dahiana Bañuelos, Toney Horner, Truman Falcon [place]  Office # [phone]      Follow Up:    Anemia, NSTEMI  Subjective/Observations:   No events overnight resting comfortably in bed.  No complaints of chest pain, dyspnea, or palpitations reported. No signs of orthopnea or PND. 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  Osteoarthritis  Afib  PVD (peripheral vascular disease)  Neuropathy: (Right lower leg)  H/O cerebral aneurysm repair: brain clips  CVA (cerebral vascular accident): (&quot;Mini-stroke&quot;,1990&#x27;s)  Rheumatoid arthritis  Hyperlipidemia  Hypertension  S/P cataract surgery: (Left eye)  Elective surgery: (&quot;Twisted bowel&quot;, 2014)  Elective surgery: (Exision of cyst on liver, 1985)  S/P ORIF (open reduction internal fixation) fracture: Left hip fx (2012) &amp; R hip fx (2013)  H/O cerebral aneurysm repair: Brain clips (1978(  Renal stone: Cysto stent placement 10/1/2014  PVD (peripheral vascular disease): s/p RLE bypass x 3, most recent 3/2012 Right external iliac to PT bypass w/ PTFE (2012)  S/P FRED (total abdominal hysterectomy): (1987, Hx of &quot;ovarian cancer?&quot;)      MEDICATIONS  (STANDING):  acetaminophen   Tablet .. 650 milliGRAM(s) Oral every 6 hours  ALBUTerol    0.083% 2.5 milliGRAM(s) Nebulizer every 8 hours  atorvastatin 20 milliGRAM(s) Oral at bedtime  chlorhexidine 2% Cloths 1 Application(s) Topical <User Schedule>  diltiazem    milliGRAM(s) Oral daily  metoprolol succinate  milliGRAM(s) Oral two times a day  pantoprazole  Injectable 40 milliGRAM(s) IV Push two times a day  sucralfate 1 Gram(s) Oral two times a day    MEDICATIONS  (PRN):  HYDROmorphone  Injectable 1 milliGRAM(s) IV Push every 4 hours PRN Severe Pain (7 - 10)  oxyCODONE    IR 5 milliGRAM(s) Oral every 4 hours PRN Moderate Pain (4 - 6)      Allergies    No Known Allergies    Intolerances        Vital Signs Last 24 Hrs  T(C): 36.9 (13 Jun 2019 05:08), Max: 37.1 (12 Jun 2019 20:44)  T(F): 98.5 (13 Jun 2019 05:08), Max: 98.8 (12 Jun 2019 20:44)  HR: 76 (13 Jun 2019 05:08) (68 - 82)  BP: 180/95 (13 Jun 2019 05:08) (127/77 - 180/95)  BP(mean): --  RR: 18 (13 Jun 2019 05:08) (16 - 18)  SpO2: 100% (13 Jun 2019 05:08) (97% - 100%)    I&O's Summary    12 Jun 2019 07:01  -  13 Jun 2019 07:00  --------------------------------------------------------  IN: 0 mL / OUT: 900 mL / NET: -900 mL          PHYSICAL EXAM:  TELE:   Constitutional: NAD, awake and alert, well-developed  HEENT: Moist Mucous Membranes, Anicteric  Pulmonary: Non-labored, breath sounds with crackles at R base , No wheezing, or rhonchi   Cardiovascular: Regular, S1 and S2 nl, No murmurs, rubs, gallops or clicks  Gastrointestinal: Bowel Sounds present, soft, nontender.   Lymph: No lymphadenopathy. No peripheral edema.  Skin: No visible rashes or ulcers.  Psych:  Mood & affect appropriate    LABS: All Labs Reviewed:                        11.3   10.51 )-----------( 149      ( 13 Jun 2019 06:19 )             34.5                         10.7   9.40  )-----------( 144      ( 12 Jun 2019 05:53 )             33.0                         8.5    11.13 )-----------( 120      ( 11 Jun 2019 06:28 )             26.6     13 Jun 2019 06:19    145    |  109    |  25     ----------------------------<  105    3.4     |  29     |  0.96   12 Jun 2019 05:53    146    |  112    |  28     ----------------------------<  111    3.1     |  25     |  1.00   11 Jun 2019 06:28    147    |  116    |  47     ----------------------------<  108    4.1     |  25     |  1.30     Ca    8.3        13 Jun 2019 06:19  Ca    8.0        12 Jun 2019 05:53  Ca    7.9        11 Jun 2019 06:28  Mg     1.9       12 Jun 2019 05:53    TPro  5.9    /  Alb  3.0    /  TBili  1.5    /  DBili  .30    /  AST  27     /  ALT  24     /  AlkPhos  61     12 Jun 2019 05:53    PT/INR - ( 12 Jun 2019 05:53 )   PT: 12.8 sec;   INR: 1.12 ratio           CARDIAC MARKERS ( 12 Jun 2019 05:53 )  5.110 ng/mL / x     / 74 U/L / x     / 4.1 ng/mL   from: CT Angio Chest w/ IV Cont (06.08.19 @ 13:02) >    EXAM:  CT ANGIO CHEST (W)AW IC                          EXAM:  CT ANGIO ABD PELV (W)AW IC                          PROCEDURE DATE:  06/08/2019        INTERPRETATION:  CTA CHEST/ABDOMEN/PELVIS:     CLINICAL INFORMATION: Pain with abdominal aortic aneurysm. Rule out   rupture    COMPARISON: CT abdomen and pelvis of 1/13/2019 and older study of   7/21/2018..    PROCEDURE:  Using multislice helical CT, 2.5 mm sections were obtained   from the thoracic inlet through the ischial tuberosities withthe   administration of intravenous contrast. Oral contrast was not   administered. 95 cc of Omnipaque 350 were administered. MIP sequences are   also performed. Preliminary noncontrast imaging was also performed.    Coronal and sagittal reformationswere performed by  CT technologist and   made available for review.    FINDINGS:  The visualized structures of the lower neck are unremarkable.   The visualized aorta is of normal course and caliber. The heart is not   enlarged. There is no evidence of pericardial effusion. There is   atherosclerotic calcification of the thoracic aorta and its branches.   There is tracheobronchial calcification. There is coronary artery   calcification.    There is no evidence of axillary, hilar, or mediastinal lymphadenopathy.    No focal lung consolidations identified. No evidence of pleural effusion   or pneumothorax. No pulmonary nodules identified. There is extensive   centrilobular emphysema. There is scarring at the lung bases with   probable honeycombing at the right lung base      The liver is of normal contour. there is no evidence of mass. The   gallbladder is remarkable for multiple calcified gallstones No evidence   of intra or extrahepatic biliary dilatation.     The pancreas, spleen, and adrenal glands are grossly unremarkable. There   is no evidence of hydronephrosis or perinephric stranding. No evidence of    nephrolithiasis.The bladder is unremarkable. There is nonobstructive   right lower pole renal calculus measuring 7 mm. There isno   hydronephrosis.    No evidence of lymphadenopathy utilizing CT size criteria. The aorta   demonstrates saccular aneurysm of the mid aorta just below the level of   the renal arteries with a configuration similar to that seen on the   previous study. There is atherosclerotic calcification of the abdominal   aorta and its branches. A stent is seen on the right at the level of the   external iliac artery with graft extending to the groin.    There is no evidence of bowel obstruction. There is no evidence of   pneumoperitoneum or free fluid. There is diverticulosis without   diverticulitis. Surgical clips are seen in the left pelvis. Surgical   sutures are again seen in the right lower quadrant and in the small bowel    The visualized osseous structures demonstrate osteopenia and degenerative   changes of the spine. There is bilateral hip ORIF.    IMPRESSION:  No significant change in the appearance of infrarenal   abdominal aortic aneurysm without evidence of leakage.  Small calcified gallstones are again noted  Nonobstructive right lower pole renal calculus is again seen in  Postsurgical changes with surgical clips in the left pelvis and right   lower quadrant small bowel sutures.  Right external iliac stent with graft extending into the right groin.   There is no flow seen within the stent/graft similar to the prior   contrast-enhanced study suggesting occlusion.    SHIMON TOSCANO M.D.,ATTENDING RADIOLOGIST  This document has been electronically signed. Jun 8 2019  1:22PM      < end of copied text >    < from: TTE Echo Doppler w/o Cont (02.05.18 @ 08:11) >     EXAM:  ECHO TTE W/O CON COMP W/DOPPLR         PROCEDURE DATE:  02/05/2018        INTERPRETATION:  INDICATION: respiratory failure  Referring M.D.:Lona  Blood Pressure 126/59        Weight (kg) :49     Height (cm):157       BSA (sq m): 1.47  Technician: JOJO    Dimensions:    LA 3.4       Normal Values: 2.0 - 4.0 cm    Ao 3.0        Normal Values: 2.0 - 3.8 cm  SEPTUM 0.8       Normal Values: 0.6 - 1.2 cm  PWT 0.8       Normal Values: 0.6 - 1.1 cm  LVIDd 3.4         Normal Values: 3.0 - 5.6 cm  LVIDs 2.3         Normal Values: 1.8 - 4.0 cm    OBSERVATIONS:  Technically difficult bedside ICU study. The patient is vented  Mitral Valve: Calcified mitral annulus and mitral valve leaflets with   normal opening. Mild mitral regurgitation.  Aortic Valve/Aorta: Calcified trileaflet aortic valve with normal opening.  Tricuspid Valve: Calcified trileaflet cusp at annulus with normally   opening valve leaflets. Mild tricuspid regurgitation.  Pulmonic Valve: The pulmonic valve is not well visualized. Probably   normal. Mild PI  Left Atrium: Small to moderate left atrial enlargement  Right Atrium: Normal  Left Ventricle: The endocardium is not well-visualized. There appears to   be grossly preserved left ventricular systolic function. There appears to   be concentric left ventricular hypertrophy. The EF is approximately 65%.  Right Ventricle: Normal right ventricular size and function. There is a   device wire noted in the right heart  Pericardium/Pleura: No pericardial effusion noted. Left pleural effusion   noted  Pulmonary/RV Pressure: The right ventricular systolic pressure is   estimated to be 41mmHg, assuming that the right atrial pressure is   estimated to be 8 mmHg. This is consistent with mild pulmonary   hypertension.  LV Diastolic Function: Stage II diastolic dysfunction    Conclusion: Likely difficult and limited study. Grossly preserved left   ventricular systolic function. Sensation diastolic dysfunction. Left   pleural effusion is noted.    KIET NIETO M.D., ATTENDING CARDIOLOGIST  This document has been electronically signed. Feb 6 2018  7:49PM  < from: TTE Echo Doppler w/o Cont (06.10.19 @ 14:06) >  XAM:  ECHO TTE WO CON COMP W DOPPLR         PROCEDURE DATE:  06/10/2019        INTERPRETATION:  INDICATION: NSTEMI    Blood Pressure 157/82    Height 154.9 cm     Weight 52.3 kg       BSA   1.49 sq m    Dimensions:    LA 3.6       Normal Values: 2.0 - 4.0 cm    Ao 2.9        Normal Values: 2.0 - 3.8 cm  SEPTUM 1.0       Normal Values: 0.6 - 1.2 cm  PWT 0.9       Normal Values: 0.6 - 1.1 cm  LVIDd 4.2         Normal Values: 3.0 - 5.6 cm  LVIDs 3.4         Normal Values: 1.8 - 4.0 cm      OBSERVATIONS:  Technically difficult study  Mitral Valve: Thickened leaflets, moderate MR.  Aortic Valve/Aorta: Sclerotic trileaflet aortic valve. Mild AI  Tricuspid Valve: normal with mild TR.  Pulmonic Valve: Mild PI  Left Atrium: Enlarged  Right Atrium:normal  Left Ventricle: Endocardium not well-visualized, estimated LVEF of   45-50%. Cannot rule out segmental dysfunction  Right Ventricle: Not well-visualized  Pericardium/Pleura: normal, no significant pericardial effusion.  Pulmonary/RV Pressure:estimated PA systolic pressure of 43 mmHg assuming   an RA pressure of 10 mmHg.  LV diastolic dysfunction is present  Small right-sided pleural effusion    Conclusion:   Technically difficult study  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 Cannot rule out segmental dysfunction  Left atrial enlargement  Sclerotic trileaflet aortic valve. Mild AI  Thickened mitral valve leaflets with moderate MR  Mild TR  Small right-sided pleural effusion                  JACQUELINE GOODEN   This document has been electronically signed. Jun 11 2019  8:16AM          < end of copied text >      < end of copied text >       [facility]   Cardiology

## 2019-06-13 NOTE — PROGRESS NOTE ADULT - ASSESSMENT
84f PAF on coumadin, HFPEF (most recent ef normal on a limited study, performed 4/2018),  CVA, clipped cerebral aneurysm HTN, HLD, mitral valve prolapse, PAD s/p bypass/stents, redo bypass, and a degree of residual non revascularizable disease, with associated sxs.  She does not clearly have a hx of cad.  She had nuclear stress testing in 2014.  She was found to have an inferolateral defect ultimately deemed artifact, though the description could conceivably have represented ischemia. presents w/ GI bleed w/ severe anemia w/ elevated lactate s/p PRBC x3 w/ vol overload    NSTEMI  - There was evidence of ischemia on EKG  - Cardiac enzymes have been elevated with troponins peaking at 25.6 and steadily trended down to 5.1 today  - Ischemia is on the basis of demand from severe anemia   - Volume and blood are appropriate to treat the ischemia at this time, and it remains inappropriate to add antiplatelet therapy at this time as she has a GI bleed and likely still actively bleeding as evidenced by her drop in H/H.  However, would diurese after each transfusion  - Need to trend H/H and transfuse to keep hgb >9  - Unable to start any antiplatelet in setting of profound anemia from GIB  - Continue Toprol to 100 BID to help control her anginal symptoms.  - Increase statin as her LFT's have normalized  - TTE yesterday showed depressed EF 40-45% compared to her last TTE ion 2/2018 showing preserved LVF.  SWMA cannot be ruled out    HTN  - Pt w/ SBP of 180 likely related to abdominal pain  - Can start ACE at low dose  - pain management    GIB  - Presented with Hgb of 4.9, s/p PRBC x 3 units.    - Continue to trend H/H.   - No overt bleeding overnight.  Planned for EGD today.    - GI following    Afib  - She presented with therapeutic inr, was reversed with kcentra and vit k in the setting of bleeding  - She is currently in NSR  - Continue CCB and BB  - Hold AC for now    HFrEF  - No evidence of volume overload  - TTE in Feb. 2018 showed preserved LVF with no significant valvular diseases.   - TTE now showing EF 40-45% but unable to r/o SWMA.  This change in LVF can be a result of her NSTEMI  - Lactate trended down (1.6 <--3.4)    Her troponins trended down nicely to 5.1 from peak of 25 with no evidence of anginal symptoms.  Her CP recently is felt to be a referred pain from her epigastrium, though may have been cardiac. Her LV function is in the 40-45% range. She has no further ventricular arrhythmias on tele.  She has no evidence significant valvular pathology.   At this point, she is optimized from a cardiac standpoint for endoscopic procedure.    [facility]  Cardiology 84f PAF on coumadin, HFPEF (most recent ef normal on a limited study, performed 4/2018),  CVA, clipped cerebral aneurysm HTN, HLD, mitral valve prolapse, PAD s/p bypass/stents, redo bypass, and a degree of residual non revascularizable disease, with associated sxs.  She does not clearly have a hx of cad.  She had nuclear stress testing in 2014.  She was found to have an inferolateral defect ultimately deemed artifact, though the description could conceivably have represented ischemia. presents w/ GI bleed w/ severe anemia w/ elevated lactate s/p PRBC x3 w/ vol overload    NSTEMI  - There was evidence of ischemia on EKG  - Cardiac enzymes have been elevated with troponins peaking at 25.6 and steadily trended down to 5.1 today  - Ischemia is on the basis of demand from severe anemia   - Volume and blood are appropriate to treat the ischemia at this time, and it remains inappropriate to add antiplatelet therapy at this time as she has a GI bleed and likely still actively bleeding as evidenced by her drop in H/H.  However, would diurese after each transfusion  - Need to trend H/H and transfuse to keep hgb >9  - Unable to start any antiplatelet in setting of profound anemia from GIB  - Continue Toprol to 100 BID to help control her anginal symptoms.  - Increase statin as her LFT's have normalized  - TTE yesterday showed depressed EF 40-45% compared to her last TTE ion 2/2018 showing preserved LVF.  SWMA cannot be ruled out  Monitor and replete electrolytes. Keep K>4.0 and Mg>2.0.     HTN  - Pt w/ SBP of 180 likely related to abdominal pain  - Can start ACE at low dose  - pain management    GIB  - Presented with Hgb of 4.9, s/p PRBC x 3 units.    - Continue to trend H/H.   - No overt bleeding overnight.  Planned for EGD today.    - GI following    Afib  - She presented with therapeutic inr, was reversed with kcentra and vit k in the setting of bleeding  - She is currently in NSR  - Continue CCB and BB  - Hold AC for now    HFrEF  - No evidence of volume overload  - TTE in Feb. 2018 showed preserved LVF with no significant valvular diseases.   - TTE now showing EF 40-45% but unable to r/o SWMA.  This change in LVF can be a result of her NSTEMI  - Lactate trended down (1.6 <--3.4)    Her troponins trended down nicely to 5.1 from peak of 25 with no evidence of anginal symptoms.  Her CP recently is felt to be a referred pain from her epigastrium, though may have been cardiac. Her LV function is in the 40-45% range. She has no further ventricular arrhythmias on tele.  She has no evidence significant valvular pathology.   She is currently hypokalemic after repletion she is optimized from a cardiac standpoint for endoscopic procedure.    [facility]  Cardiology 84f PAF on coumadin, HFPEF (most recent ef normal on a limited study, performed 4/2018),  CVA, clipped cerebral aneurysm HTN, HLD, mitral valve prolapse, PAD s/p bypass/stents, redo bypass, and a degree of residual non revascularizable disease, with associated sxs.  She does not clearly have a hx of cad.  She had nuclear stress testing in 2014.  She was found to have an inferolateral defect ultimately deemed artifact, though the description could conceivably have represented ischemia. presents w/ GI bleed w/ severe anemia w/ elevated lactate s/p PRBC x3 w/ vol overload    NSTEMI  - There was evidence of ischemia on EKG  - Cardiac enzymes have been elevated with troponins peaking at 25.6 and steadily trended down to 5.1 today  - Ischemia was on the basis of demand from severe anemia   -  it remains inappropriate to add antiplatelet therapy at this time as she has a GI bleed   - Need to trend H/H and transfuse to keep hgb >9  - Continue Toprol 100 BID to help control her anginal symptoms.  - Increase statin as her LFT's have normalized  - TTE yesterday showed depressed EF 40-45% compared to her last TTE ion 2/2018 showing preserved LVF.  SWMA cannot be ruled out  Monitor and replete electrolytes. Keep K>4.0 and Mg>2.0.     HTN  - Pt w/ SBP of 180 likely related to abdominal pain  - Can start ACE at low dose  - pain management    GIB  - Presented with Hgb of 4.9, s/p PRBC x 3 units.    - Continue to trend H/H.   - No overt bleeding overnight.  Planned for EGD today.    - GI following    Afib  - She presented with therapeutic inr, was reversed with kcentra and vit k in the setting of bleeding  - She is currently in NSR  - Continue CCB and BB  - Hold AC for now    HFrEF  - No evidence of volume overload  - TTE in Feb. 2018 showed preserved LVF with no significant valvular diseases.   - TTE now showing EF 40-45% but unable to r/o SWMA.  This change in LVF can be a result of her NSTEMI  - Lactate trended down (1.6 <--3.4)    Her troponins trended down nicely to 5.1 from peak of 25 with no evidence of anginal symptoms.  Her CP recently is felt to be a referred pain from her epigastrium, though may have been cardiac. Her LV function is in the 40-45% range. She has no further ventricular arrhythmias on tele.  She has no evidence significant valvular pathology.   She is currently hypokalemic.  After repletion she is optimized from a cardiac standpoint for endoscopic procedure.    [facility]  Cardiology

## 2019-06-13 NOTE — PROGRESS NOTE ADULT - PROBLEM SELECTOR PLAN 1
seen and examined, vs and meds reviewed, planned for EGD this am -   GI Follow up noted, remains on PPI BID for GIB  no objection from Pulmonary  PPI  off AC  on NEBS for COPD Emphysema  O2 support as needed  keep sat > 88 pct  assist with ADL  oral hygiene  skin care  monitor vs and HD and sat  serial hgb and labs  will follow  medical notes and follow ups noted.

## 2019-06-14 LAB
ANION GAP SERPL CALC-SCNC: 8 MMOL/L — SIGNIFICANT CHANGE UP (ref 5–17)
BASOPHILS # BLD AUTO: 0.02 K/UL — SIGNIFICANT CHANGE UP (ref 0–0.2)
BASOPHILS NFR BLD AUTO: 0.1 % — SIGNIFICANT CHANGE UP (ref 0–2)
BUN SERPL-MCNC: 24 MG/DL — HIGH (ref 7–23)
CALCIUM SERPL-MCNC: 7.9 MG/DL — LOW (ref 8.5–10.1)
CHLORIDE SERPL-SCNC: 107 MMOL/L — SIGNIFICANT CHANGE UP (ref 96–108)
CO2 SERPL-SCNC: 28 MMOL/L — SIGNIFICANT CHANGE UP (ref 22–31)
CREAT SERPL-MCNC: 0.93 MG/DL — SIGNIFICANT CHANGE UP (ref 0.5–1.3)
CULTURE RESULTS: SIGNIFICANT CHANGE UP
CULTURE RESULTS: SIGNIFICANT CHANGE UP
EOSINOPHIL # BLD AUTO: 0.08 K/UL — SIGNIFICANT CHANGE UP (ref 0–0.5)
EOSINOPHIL NFR BLD AUTO: 0.5 % — SIGNIFICANT CHANGE UP (ref 0–6)
GLUCOSE SERPL-MCNC: 103 MG/DL — HIGH (ref 70–99)
HCT VFR BLD CALC: 35 % — SIGNIFICANT CHANGE UP (ref 34.5–45)
HGB BLD-MCNC: 11.4 G/DL — LOW (ref 11.5–15.5)
IMM GRANULOCYTES NFR BLD AUTO: 0.4 % — SIGNIFICANT CHANGE UP (ref 0–1.5)
INR BLD: 1.18 RATIO — HIGH (ref 0.88–1.16)
LYMPHOCYTES # BLD AUTO: 0.82 K/UL — LOW (ref 1–3.3)
LYMPHOCYTES # BLD AUTO: 5.6 % — LOW (ref 13–44)
MCHC RBC-ENTMCNC: 29.8 PG — SIGNIFICANT CHANGE UP (ref 27–34)
MCHC RBC-ENTMCNC: 32.6 GM/DL — SIGNIFICANT CHANGE UP (ref 32–36)
MCV RBC AUTO: 91.6 FL — SIGNIFICANT CHANGE UP (ref 80–100)
MONOCYTES # BLD AUTO: 1.31 K/UL — HIGH (ref 0–0.9)
MONOCYTES NFR BLD AUTO: 8.9 % — SIGNIFICANT CHANGE UP (ref 2–14)
NEUTROPHILS # BLD AUTO: 12.4 K/UL — HIGH (ref 1.8–7.4)
NEUTROPHILS NFR BLD AUTO: 84.5 % — HIGH (ref 43–77)
NRBC # BLD: 0 /100 WBCS — SIGNIFICANT CHANGE UP (ref 0–0)
PLATELET # BLD AUTO: 150 K/UL — SIGNIFICANT CHANGE UP (ref 150–400)
POTASSIUM SERPL-MCNC: 3.6 MMOL/L — SIGNIFICANT CHANGE UP (ref 3.5–5.3)
POTASSIUM SERPL-SCNC: 3.6 MMOL/L — SIGNIFICANT CHANGE UP (ref 3.5–5.3)
PROTHROM AB SERPL-ACNC: 13.5 SEC — HIGH (ref 10–12.9)
RBC # BLD: 3.82 M/UL — SIGNIFICANT CHANGE UP (ref 3.8–5.2)
RBC # FLD: 17.4 % — HIGH (ref 10.3–14.5)
SODIUM SERPL-SCNC: 143 MMOL/L — SIGNIFICANT CHANGE UP (ref 135–145)
SPECIMEN SOURCE: SIGNIFICANT CHANGE UP
SPECIMEN SOURCE: SIGNIFICANT CHANGE UP
WBC # BLD: 14.69 K/UL — HIGH (ref 3.8–10.5)
WBC # FLD AUTO: 14.69 K/UL — HIGH (ref 3.8–10.5)

## 2019-06-14 PROCEDURE — 99233 SBSQ HOSP IP/OBS HIGH 50: CPT

## 2019-06-14 PROCEDURE — 74018 RADEX ABDOMEN 1 VIEW: CPT | Mod: 26

## 2019-06-14 PROCEDURE — 99233 SBSQ HOSP IP/OBS HIGH 50: CPT | Mod: GC

## 2019-06-14 RX ORDER — ASPIRIN/CALCIUM CARB/MAGNESIUM 324 MG
81 TABLET ORAL DAILY
Refills: 0 | Status: DISCONTINUED | OUTPATIENT
Start: 2019-06-14 | End: 2019-06-17

## 2019-06-14 RX ORDER — DOCUSATE SODIUM 100 MG
100 CAPSULE ORAL THREE TIMES A DAY
Refills: 0 | Status: DISCONTINUED | OUTPATIENT
Start: 2019-06-14 | End: 2019-06-17

## 2019-06-14 RX ORDER — SENNA PLUS 8.6 MG/1
2 TABLET ORAL AT BEDTIME
Refills: 0 | Status: DISCONTINUED | OUTPATIENT
Start: 2019-06-14 | End: 2019-06-17

## 2019-06-14 RX ORDER — WARFARIN SODIUM 2.5 MG/1
5 TABLET ORAL ONCE
Refills: 0 | Status: COMPLETED | OUTPATIENT
Start: 2019-06-14 | End: 2019-06-14

## 2019-06-14 RX ORDER — ONDANSETRON 8 MG/1
4 TABLET, FILM COATED ORAL ONCE
Refills: 0 | Status: DISCONTINUED | OUTPATIENT
Start: 2019-06-14 | End: 2019-06-17

## 2019-06-14 RX ORDER — POLYETHYLENE GLYCOL 3350 17 G/17G
17 POWDER, FOR SOLUTION ORAL DAILY
Refills: 0 | Status: DISCONTINUED | OUTPATIENT
Start: 2019-06-14 | End: 2019-06-17

## 2019-06-14 RX ORDER — PANTOPRAZOLE SODIUM 20 MG/1
40 TABLET, DELAYED RELEASE ORAL EVERY 12 HOURS
Refills: 0 | Status: DISCONTINUED | OUTPATIENT
Start: 2019-06-14 | End: 2019-06-17

## 2019-06-14 RX ADMIN — Medication 650 MILLIGRAM(S): at 23:20

## 2019-06-14 RX ADMIN — PANTOPRAZOLE SODIUM 40 MILLIGRAM(S): 20 TABLET, DELAYED RELEASE ORAL at 18:40

## 2019-06-14 RX ADMIN — WARFARIN SODIUM 5 MILLIGRAM(S): 2.5 TABLET ORAL at 21:20

## 2019-06-14 RX ADMIN — Medication 100 MILLIGRAM(S): at 18:40

## 2019-06-14 RX ADMIN — ALBUTEROL 2.5 MILLIGRAM(S): 90 AEROSOL, METERED ORAL at 19:59

## 2019-06-14 RX ADMIN — MORPHINE SULFATE 1 MILLIGRAM(S): 50 CAPSULE, EXTENDED RELEASE ORAL at 15:44

## 2019-06-14 RX ADMIN — Medication 100 MILLIGRAM(S): at 21:20

## 2019-06-14 RX ADMIN — Medication 81 MILLIGRAM(S): at 18:40

## 2019-06-14 RX ADMIN — Medication 650 MILLIGRAM(S): at 18:30

## 2019-06-14 RX ADMIN — Medication 120 MILLIGRAM(S): at 05:08

## 2019-06-14 RX ADMIN — PANTOPRAZOLE SODIUM 40 MILLIGRAM(S): 20 TABLET, DELAYED RELEASE ORAL at 05:08

## 2019-06-14 RX ADMIN — POLYETHYLENE GLYCOL 3350 17 GRAM(S): 17 POWDER, FOR SOLUTION ORAL at 18:40

## 2019-06-14 RX ADMIN — Medication 650 MILLIGRAM(S): at 06:15

## 2019-06-14 RX ADMIN — Medication 650 MILLIGRAM(S): at 13:37

## 2019-06-14 RX ADMIN — MORPHINE SULFATE 1 MILLIGRAM(S): 50 CAPSULE, EXTENDED RELEASE ORAL at 07:40

## 2019-06-14 RX ADMIN — Medication 650 MILLIGRAM(S): at 00:41

## 2019-06-14 RX ADMIN — Medication 650 MILLIGRAM(S): at 05:08

## 2019-06-14 RX ADMIN — ATORVASTATIN CALCIUM 20 MILLIGRAM(S): 80 TABLET, FILM COATED ORAL at 21:21

## 2019-06-14 RX ADMIN — SENNA PLUS 2 TABLET(S): 8.6 TABLET ORAL at 21:20

## 2019-06-14 RX ADMIN — MORPHINE SULFATE 1 MILLIGRAM(S): 50 CAPSULE, EXTENDED RELEASE ORAL at 15:29

## 2019-06-14 RX ADMIN — Medication 10 MILLIGRAM(S): at 12:37

## 2019-06-14 RX ADMIN — Medication 650 MILLIGRAM(S): at 12:37

## 2019-06-14 RX ADMIN — LISINOPRIL 2.5 MILLIGRAM(S): 2.5 TABLET ORAL at 05:08

## 2019-06-14 RX ADMIN — Medication 100 MILLIGRAM(S): at 13:01

## 2019-06-14 RX ADMIN — Medication 1 GRAM(S): at 05:08

## 2019-06-14 RX ADMIN — Medication 650 MILLIGRAM(S): at 19:40

## 2019-06-14 RX ADMIN — ALBUTEROL 2.5 MILLIGRAM(S): 90 AEROSOL, METERED ORAL at 14:31

## 2019-06-14 RX ADMIN — CHLORHEXIDINE GLUCONATE 1 APPLICATION(S): 213 SOLUTION TOPICAL at 05:09

## 2019-06-14 RX ADMIN — MORPHINE SULFATE 1 MILLIGRAM(S): 50 CAPSULE, EXTENDED RELEASE ORAL at 07:25

## 2019-06-14 RX ADMIN — Medication 650 MILLIGRAM(S): at 18:38

## 2019-06-14 RX ADMIN — FLUCONAZOLE 100 MILLIGRAM(S): 150 TABLET ORAL at 12:37

## 2019-06-14 RX ADMIN — Medication 100 MILLIGRAM(S): at 05:08

## 2019-06-14 RX ADMIN — ALBUTEROL 2.5 MILLIGRAM(S): 90 AEROSOL, METERED ORAL at 07:34

## 2019-06-14 RX ADMIN — MORPHINE SULFATE 1 MILLIGRAM(S): 50 CAPSULE, EXTENDED RELEASE ORAL at 00:42

## 2019-06-14 NOTE — PROGRESS NOTE ADULT - SUBJECTIVE AND OBJECTIVE BOX
The patient was evaluated  84y Female s/p EGD with GA    T(C): 36.8 (06-14-19 @ 07:36), Max: 37.1 (06-13-19 @ 14:10)  HR: 78 (06-14-19 @ 07:37) (65 - 95)  BP: 160/81 (06-14-19 @ 07:36) (136/81 - 179/96)  RR: 18 (06-14-19 @ 07:36) (12 - 19)  SpO2: 98% (06-14-19 @ 07:37) (93% - 100%)  Wt(kg): --    Pt seen, doing well, no anesthesia complications or complaints noted or reported.   No Nausea    No additional recommendations.

## 2019-06-14 NOTE — PROGRESS NOTE ADULT - PROBLEM SELECTOR PLAN 7
hold aspirin, await GI recs   continue bedtime atorvastatin C/W aspirin,   continue bedtime atorvastatin

## 2019-06-14 NOTE — PHYSICAL THERAPY INITIAL EVALUATION ADULT - PHYSICAL ASSIST/NONPHYSICAL ASSIST: GAIT, REHAB EVAL
verbal cues/Cues for safety, navigating RW/nonverbal cues (demo/gestures)/set-up required/1 person assist

## 2019-06-14 NOTE — PROGRESS NOTE ADULT - PROBLEM SELECTOR PLAN 4
hold coumadin  monitor electrolytes closely K>4 Mg >2  Metoprolol IVP with hold parameters   Cardiology following Restart coumadin a sper GI   monitor electrolytes closely K>4 Mg >2  Metoprolol po with hold parameters   Cardiology following

## 2019-06-14 NOTE — PROVIDER CONTACT NOTE (OTHER) - SITUATION
Pt admitted with GI bleed, states she has been having abd pain for the last few days, Awakened this am and stated that her abd pain was better. OOB to chair this am, william well

## 2019-06-14 NOTE — PROGRESS NOTE ADULT - PROBLEM SELECTOR PLAN 2
Likely secondary to acute demand ischemia in setting of severe anemia  Not a candidate for antiplatelet agents secondary to GIB  TTE reviewed  Statin, BB   Cardio following Likely secondary to acute demand ischemia in setting of severe anemia  will resume ASA and no contraindication as per GI.  TTE: LVEF 45-50% with mod MR.  c/w Statin, BB   Cardio following

## 2019-06-14 NOTE — PHYSICAL THERAPY INITIAL EVALUATION ADULT - IMPAIRMENTS CONTRIBUTING TO GAIT DEVIATIONS, PT EVAL
impaired balance/impaired postural control/narrow base of support/decreased toe-to-floor clearance, decreased hip/knee flexion/decreased strength

## 2019-06-14 NOTE — PROGRESS NOTE ADULT - PROBLEM SELECTOR PLAN 1
EGD showed no evidence of acute GIB although shows esophageal candidiasis. Fluconazole started.   GI recommends outpt occult bleeding capsule work up    H/H is stable.   C/o abdominal pain likely 2/2 constipation. Abdominal XR ordered, suppository   GI Dr Lassiter following  Tranfusion to hbg >9 as per cardio recs (given NSTEMI)  - s/p 4 units throughout hospital stay   - Holding warfarin   - For EGD today - The patient is considered high risk for low risk procedure  - Pain control- morphine added to pain regimen

## 2019-06-14 NOTE — PROVIDER CONTACT NOTE (OTHER) - ASSESSMENT
Patient is a/ox4. Denies any sob/ chest pain. No acute distress noted.
Pt is alert and oriented, denies any c/o nauseau.  Dulcolax suppos admin as ordered, no BM as of yet
pt is alert and oriented. c/o abd pain, med with morphine with some decrease in pain

## 2019-06-14 NOTE — PHYSICAL THERAPY INITIAL EVALUATION ADULT - ADDITIONAL COMMENTS
Pt lives alone in apartment [address], no steps inside.  Pt has aide 7 hrs/day; 7 days a week. Pt has tub shower with seat and grab bars. Pt ambulated independently w/ RW PTA. Pt performed mostly independently, but was supervised by HHA for bathing.

## 2019-06-14 NOTE — PROGRESS NOTE ADULT - ASSESSMENT
84f PAF on coumadin, HFPEF (most recent ef normal on a limited study, performed 4/2018),  CVA, clipped cerebral aneurysm HTN, HLD, mitral valve prolapse, PAD s/p bypass/stents, redo bypass, and a degree of residual non revascularizable disease, with associated sxs.  She does not clearly have a hx of cad.  She had nuclear stress testing in 2014.  She was found to have an inferolateral defect ultimately deemed artifact, though the description could conceivably have represented ischemia. presents w/ GI bleed w/ severe anemia w/ elevated lactate s/p PRBC x3 w/ vol overload    NSTEMI  - There was evidence of ischemia on EKG  - Cardiac enzymes have been elevated with troponins peaking at 25.6 and steadily trended down to 5.1 today  - Ischemia was on the basis of demand from severe anemia   -  it remains inappropriate to add antiplatelet therapy at this time as she has a GI bleed   - Need to trend H/H and transfuse to keep hgb >9  - Continue Toprol 100 BID to help control her anginal symptoms.  - Increase statin as her LFT's have normalized  - TTE yesterday showed depressed EF 40-45% compared to her last TTE ion 2/2018 showing preserved LVF.  SWMA cannot be ruled out  Monitor and replete electrolytes. Keep K>4.0 and Mg>2.0.     HTN  - Improved  - C/w ACE at low dose  - pain management    GIB  - Presented with Hgb of 4.9, s/p PRBC x 3 units.    - Continue to trend H/H.   - sp egd- findings of candida esophagitis, gastritis and bile reflux; no stigmata of recent bleeding  cont diflucan x 5 days  - GI following    Afib  - She presented with therapeutic inr, was reversed with kcentra and vit k in the setting of bleeding  - She is currently in NSR  - Continue CCB and BB  - Hold AC for now    HFrEF  - No evidence of volume overload  - TTE in Feb. 2018 showed preserved LVF with no significant valvular diseases.   - TTE now showing EF 40-45% but unable to r/o SWMA.  This change in LVF can be a result of her NSTEMI  - Lactate trended down (1.6 <--3.4)      Campbell Juan [facility]  Cardiology

## 2019-06-14 NOTE — PROGRESS NOTE ADULT - ATTENDING COMMENTS
I saw and examined the patient personally. Spoke with above provider regarding this case. I reviewed the above findings completely.  I agree with the above history, physical, and plan which I have edited where appropriate.   Pt with severe abd pain when I saw here with tenderness to palp on exam.   likely from underlying gastritis. ?constipation. need bowel regimen.   cont gi w/u  check amylase and lipase for pancreatitis.   pain control.

## 2019-06-14 NOTE — PHYSICAL THERAPY INITIAL EVALUATION ADULT - GENERAL OBSERVATIONS, REHAB EVAL
Pt received semi-amaral in bed, 3L O2 via nc, +female external catheter. Pt agreeable to PT consultation

## 2019-06-14 NOTE — PROGRESS NOTE ADULT - SUBJECTIVE AND OBJECTIVE BOX
[facility] -- Dahiana Bañuelos, Toney Horner, Truman Falcon Savella  Office # [phone]      Follow Up:    Anemia, NSTEMI  Subjective/Observations:   No events overnight resting comfortably in bed.  No complaints of chest pain, dyspnea, or palpitations reported. No signs of orthopnea or PND. 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  Osteoarthritis  Afib  PVD (peripheral vascular disease)  Neuropathy: (Right lower leg)  H/O cerebral aneurysm repair: brain clips  CVA (cerebral vascular accident): (&quot;Mini-stroke&quot;,1990&#x27;s)  Rheumatoid arthritis  Hyperlipidemia  Hypertension  S/P cataract surgery: (Left eye)  Elective surgery: (&quot;Twisted bowel&quot;, 2014)  Elective surgery: (Exision of cyst on liver, 1985)  S/P ORIF (open reduction internal fixation) fracture: Left hip fx (2012) &amp; R hip fx (2013)  H/O cerebral aneurysm repair: Brain clips (1978(  Renal stone: Cysto stent placement 10/1/2014  PVD (peripheral vascular disease): s/p RLE bypass x 3, most recent 3/2012 Right external iliac to PT bypass w/ PTFE (2012)  S/P FRED (total abdominal hysterectomy): (1987, Hx of &quot;ovarian cancer?&quot;)      MEDICATIONS  (STANDING):  acetaminophen   Tablet .. 650 milliGRAM(s) Oral every 6 hours  ALBUTerol    0.083% 2.5 milliGRAM(s) Nebulizer every 8 hours  atorvastatin 20 milliGRAM(s) Oral at bedtime  chlorhexidine 2% Cloths 1 Application(s) Topical <User Schedule>  diltiazem    milliGRAM(s) Oral daily  docusate sodium 100 milliGRAM(s) Oral three times a day  fluconAZOLE   Tablet 100 milliGRAM(s) Oral daily  lisinopril 2.5 milliGRAM(s) Oral daily  metoprolol succinate  milliGRAM(s) Oral two times a day  pantoprazole    Tablet 40 milliGRAM(s) Oral every 12 hours  polyethylene glycol 3350 17 Gram(s) Oral daily  senna 2 Tablet(s) Oral at bedtime    MEDICATIONS  (PRN):  morphine  - Injectable 1 milliGRAM(s) IV Push every 6 hours PRN Severe Pain (7 - 10)  ondansetron Injectable 4 milliGRAM(s) IV Push once PRN Nausea and/or Vomiting  oxyCODONE    IR 5 milliGRAM(s) Oral every 4 hours PRN Moderate Pain (4 - 6)      Allergies    No Known Allergies    Intolerances        Vital Signs Last 24 Hrs  T(C): 36.8 (14 Jun 2019 12:01), Max: 37.1 (13 Jun 2019 14:10)  T(F): 98.3 (14 Jun 2019 12:01), Max: 98.8 (13 Jun 2019 14:10)  HR: 73 (14 Jun 2019 12:01) (65 - 95)  BP: 109/72 (14 Jun 2019 12:01) (109/72 - 179/96)  BP(mean): --  RR: 17 (14 Jun 2019 12:01) (12 - 19)  SpO2: 96% (14 Jun 2019 12:01) (93% - 100%)    I&O's Summary    13 Jun 2019 07:01  -  14 Jun 2019 07:00  --------------------------------------------------------  IN: 240 mL / OUT: 250 mL / NET: -10 mL          PHYSICAL EXAM:  TELE: NSR  Constitutional: NAD, awake and alert, well-developed  HEENT: Moist Mucous Membranes, Anicteric  Pulmonary: Non-labored, breath sounds are clear bilaterally, No wheezing, crackles or rhonchi  Cardiovascular: Regular, S1 and S2 nl, No murmurs, rubs, gallops or clicks  Gastrointestinal: Bowel Sounds present, soft, nontender.   Lymph: No lymphadenopathy. No peripheral edema.  Skin: No visible rashes or ulcers.  Psych:  Mood & affect appropriate    LABS: All Labs Reviewed:                        11.4   14.69 )-----------( 150      ( 14 Jun 2019 06:53 )             35.0                         11.3   10.51 )-----------( 149      ( 13 Jun 2019 06:19 )             34.5                         10.7   9.40  )-----------( 144      ( 12 Jun 2019 05:53 )             33.0     14 Jun 2019 06:53    143    |  107    |  24     ----------------------------<  103    3.6     |  28     |  0.93   13 Jun 2019 06:19    145    |  109    |  25     ----------------------------<  105    3.4     |  29     |  0.96   12 Jun 2019 05:53    146    |  112    |  28     ----------------------------<  111    3.1     |  25     |  1.00     Ca    7.9        14 Jun 2019 06:53  Ca    8.3        13 Jun 2019 06:19  Ca    8.0        12 Jun 2019 05:53  Mg     1.9       12 Jun 2019 05:53    TPro  5.3    /  Alb  2.4    /  TBili  1.3    /  DBili  .30    /  AST  22     /  ALT  19     /  AlkPhos  57     14 Jun 2019 06:53  TPro  5.9    /  Alb  3.0    /  TBili  1.5    /  DBili  .30    /  AST  27     /  ALT  24     /  AlkPhos  61     12 Jun 2019 05:53      from: CT Angio Chest w/ IV Cont (06.08.19 @ 13:02) >    EXAM:  CT ANGIO CHEST (W)AW IC                          EXAM:  CT ANGIO ABD PELV (W)AW IC                          PROCEDURE DATE:  06/08/2019        INTERPRETATION:  CTA CHEST/ABDOMEN/PELVIS:     CLINICAL INFORMATION: Pain with abdominal aortic aneurysm. Rule out   rupture    COMPARISON: CT abdomen and pelvis of 1/13/2019 and older study of   7/21/2018..    PROCEDURE:  Using multislice helical CT, 2.5 mm sections were obtained   from the thoracic inlet through the ischial tuberosities withthe   administration of intravenous contrast. Oral contrast was not   administered. 95 cc of Omnipaque 350 were administered. MIP sequences are   also performed. Preliminary noncontrast imaging was also performed.    Coronal and sagittal reformationswere performed by  CT technologist and   made available for review.    FINDINGS:  The visualized structures of the lower neck are unremarkable.   The visualized aorta is of normal course and caliber. The heart is not   enlarged. There is no evidence of pericardial effusion. There is   atherosclerotic calcification of the thoracic aorta and its branches.   There is tracheobronchial calcification. There is coronary artery   calcification.    There is no evidence of axillary, hilar, or mediastinal lymphadenopathy.    No focal lung consolidations identified. No evidence of pleural effusion   or pneumothorax. No pulmonary nodules identified. There is extensive   centrilobular emphysema. There is scarring at the lung bases with   probable honeycombing at the right lung base      The liver is of normal contour. there is no evidence of mass. The   gallbladder is remarkable for multiple calcified gallstones No evidence   of intra or extrahepatic biliary dilatation.     The pancreas, spleen, and adrenal glands are grossly unremarkable. There   is no evidence of hydronephrosis or perinephric stranding. No evidence of    nephrolithiasis.The bladder is unremarkable. There is nonobstructive   right lower pole renal calculus measuring 7 mm. There isno   hydronephrosis.    No evidence of lymphadenopathy utilizing CT size criteria. The aorta   demonstrates saccular aneurysm of the mid aorta just below the level of   the renal arteries with a configuration similar to that seen on the   previous study. There is atherosclerotic calcification of the abdominal   aorta and its branches. A stent is seen on the right at the level of the   external iliac artery with graft extending to the groin.    There is no evidence of bowel obstruction. There is no evidence of   pneumoperitoneum or free fluid. There is diverticulosis without   diverticulitis. Surgical clips are seen in the left pelvis. Surgical   sutures are again seen in the right lower quadrant and in the small bowel    The visualized osseous structures demonstrate osteopenia and degenerative   changes of the spine. There is bilateral hip ORIF.    IMPRESSION:  No significant change in the appearance of infrarenal   abdominal aortic aneurysm without evidence of leakage.  Small calcified gallstones are again noted  Nonobstructive right lower pole renal calculus is again seen in  Postsurgical changes with surgical clips in the left pelvis and right   lower quadrant small bowel sutures.  Right external iliac stent with graft extending into the right groin.   There is no flow seen within the stent/graft similar to the prior   contrast-enhanced study suggesting occlusion.    SHIMON TOSCANO M.D.,ATTENDING RADIOLOGIST  This document has been electronically signed. Jun 8 2019  1:22PM      < end of copied text >    < from: TTE Echo Doppler w/o Cont (02.05.18 @ 08:11) >     EXAM:  ECHO TTE W/O CON COMP W/DOPPLR         PROCEDURE DATE:  02/05/2018        INTERPRETATION:  INDICATION: respiratory failure  Referring M.D.:Lona  Blood Pressure 126/59        Weight (kg) :49     Height (cm):157       BSA (sq m): 1.47  Technician: JOJO    Dimensions:    LA 3.4       Normal Values: 2.0 - 4.0 cm    Ao 3.0        Normal Values: 2.0 - 3.8 cm  SEPTUM 0.8       Normal Values: 0.6 - 1.2 cm  PWT 0.8       Normal Values: 0.6 - 1.1 cm  LVIDd 3.4         Normal Values: 3.0 - 5.6 cm  LVIDs 2.3         Normal Values: 1.8 - 4.0 cm    OBSERVATIONS:  Technically difficult bedside ICU study. The patient is vented  Mitral Valve: Calcified mitral annulus and mitral valve leaflets with   normal opening. Mild mitral regurgitation.  Aortic Valve/Aorta: Calcified trileaflet aortic valve with normal opening.  Tricuspid Valve: Calcified trileaflet cusp at annulus with normally   opening valve leaflets. Mild tricuspid regurgitation.  Pulmonic Valve: The pulmonic valve is not well visualized. Probably   normal. Mild PI  Left Atrium: Small to moderate left atrial enlargement  Right Atrium: Normal  Left Ventricle: The endocardium is not well-visualized. There appears to   be grossly preserved left ventricular systolic function. There appears to   be concentric left ventricular hypertrophy. The EF is approximately 65%.  Right Ventricle: Normal right ventricular size and function. There is a   device wire noted in the right heart  Pericardium/Pleura: No pericardial effusion noted. Left pleural effusion   noted  Pulmonary/RV Pressure: The right ventricular systolic pressure is   estimated to be 41mmHg, assuming that the right atrial pressure is   estimated to be 8 mmHg. This is consistent with mild pulmonary   hypertension.  LV Diastolic Function: Stage II diastolic dysfunction    Conclusion: Likely difficult and limited study. Grossly preserved left   ventricular systolic function. Sensation diastolic dysfunction. Left   pleural effusion is noted.    KIET NIETO M.D., ATTENDING CARDIOLOGIST  This document has been electronically signed. Feb 6 2018  7:49PM  < from: TTE Echo Doppler w/o Cont (06.10.19 @ 14:06) >  XAM:  ECHO TTE WO CON COMP W DOPPLR         PROCEDURE DATE:  06/10/2019        INTERPRETATION:  INDICATION: NSTEMI    Blood Pressure 157/82    Height 154.9 cm     Weight 52.3 kg       BSA   1.49 sq m    Dimensions:    LA 3.6       Normal Values: 2.0 - 4.0 cm    Ao 2.9        Normal Values: 2.0 - 3.8 cm  SEPTUM 1.0       Normal Values: 0.6 - 1.2 cm  PWT 0.9       Normal Values: 0.6 - 1.1 cm  LVIDd 4.2         Normal Values: 3.0 - 5.6 cm  LVIDs 3.4         Normal Values: 1.8 - 4.0 cm      OBSERVATIONS:  Technically difficult study  Mitral Valve: Thickened leaflets, moderate MR.  Aortic Valve/Aorta: Sclerotic trileaflet aortic valve. Mild AI  Tricuspid Valve: normal with mild TR.  Pulmonic Valve: Mild PI  Left Atrium: Enlarged  Right Atrium:normal  Left Ventricle: Endocardium not well-visualized, estimated LVEF of   45-50%. Cannot rule out segmental dysfunction  Right Ventricle: Not well-visualized  Pericardium/Pleura: normal, no significant pericardial effusion.  Pulmonary/RV Pressure:estimated PA systolic pressure of 43 mmHg assuming   an RA pressure of 10 mmHg.  LV diastolic dysfunction is present  Small right-sided pleural effusion    Conclusion:   Technically difficult study  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 Cannot rule out segmental dysfunction  Left atrial enlargement  Sclerotic trileaflet aortic valve. Mild AI  Thickened mitral valve leaflets with moderate MR  Mild TR  Small right-sided pleural effusion          JACQUELINE GOODEN   This document has been electronically signed. Jun 11 2019  8:16AM             Campbell Juan [facility]   Cardiology [facility] -- Dahiana Bañuelos, Toney Horner, Truman Falcon Savella  Office # [phone]      Follow Up:    Anemia, NSTEMI    Subjective/Observations:   No events overnight resting comfortably in bed.  No complaints of chest pain, dyspnea, or palpitations reported. No signs of orthopnea or PND. 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  Osteoarthritis  Afib  PVD (peripheral vascular disease)  Neuropathy: (Right lower leg)  H/O cerebral aneurysm repair: brain clips  CVA (cerebral vascular accident): (&quot;Mini-stroke&quot;,1990&#x27;s)  Rheumatoid arthritis  Hyperlipidemia  Hypertension  S/P cataract surgery: (Left eye)  Elective surgery: (&quot;Twisted bowel&quot;, 2014)  Elective surgery: (Exision of cyst on liver, 1985)  S/P ORIF (open reduction internal fixation) fracture: Left hip fx (2012) &amp; R hip fx (2013)  H/O cerebral aneurysm repair: Brain clips (1978(  Renal stone: Cysto stent placement 10/1/2014  PVD (peripheral vascular disease): s/p RLE bypass x 3, most recent 3/2012 Right external iliac to PT bypass w/ PTFE (2012)  S/P FRED (total abdominal hysterectomy): (1987, Hx of &quot;ovarian cancer?&quot;)      MEDICATIONS  (STANDING):  acetaminophen   Tablet .. 650 milliGRAM(s) Oral every 6 hours  ALBUTerol    0.083% 2.5 milliGRAM(s) Nebulizer every 8 hours  atorvastatin 20 milliGRAM(s) Oral at bedtime  chlorhexidine 2% Cloths 1 Application(s) Topical <User Schedule>  diltiazem    milliGRAM(s) Oral daily  docusate sodium 100 milliGRAM(s) Oral three times a day  fluconAZOLE   Tablet 100 milliGRAM(s) Oral daily  lisinopril 2.5 milliGRAM(s) Oral daily  metoprolol succinate  milliGRAM(s) Oral two times a day  pantoprazole    Tablet 40 milliGRAM(s) Oral every 12 hours  polyethylene glycol 3350 17 Gram(s) Oral daily  senna 2 Tablet(s) Oral at bedtime    MEDICATIONS  (PRN):  morphine  - Injectable 1 milliGRAM(s) IV Push every 6 hours PRN Severe Pain (7 - 10)  ondansetron Injectable 4 milliGRAM(s) IV Push once PRN Nausea and/or Vomiting  oxyCODONE    IR 5 milliGRAM(s) Oral every 4 hours PRN Moderate Pain (4 - 6)      Allergies    No Known Allergies    Intolerances        Vital Signs Last 24 Hrs  T(C): 36.8 (14 Jun 2019 12:01), Max: 37.1 (13 Jun 2019 14:10)  T(F): 98.3 (14 Jun 2019 12:01), Max: 98.8 (13 Jun 2019 14:10)  HR: 73 (14 Jun 2019 12:01) (65 - 95)  BP: 109/72 (14 Jun 2019 12:01) (109/72 - 179/96)  BP(mean): --  RR: 17 (14 Jun 2019 12:01) (12 - 19)  SpO2: 96% (14 Jun 2019 12:01) (93% - 100%)    I&O's Summary    13 Jun 2019 07:01  -  14 Jun 2019 07:00  --------------------------------------------------------  IN: 240 mL / OUT: 250 mL / NET: -10 mL          PHYSICAL EXAM:  TELE: NSR  Constitutional: apepar uncomfortable  HEENT: Moist Mucous Membranes, Anicteric  Pulmonary: Decreased breath sounds b/l. No rales, crackles or wheeze appreciated.   Cardiovascular: IRRR, S1 and S2 nl, 2/6 SM  Gastrointestinal: Bowel Sounds present, soft, mildly tender.   Lymph: No lymphadenopathy. No peripheral edema.  Skin: No visible rashes or ulcers.  Psych:  Mood & affect appropriate    LABS: All Labs Reviewed:                        11.4   14.69 )-----------( 150      ( 14 Jun 2019 06:53 )             35.0                         11.3   10.51 )-----------( 149      ( 13 Jun 2019 06:19 )             34.5                         10.7   9.40  )-----------( 144      ( 12 Jun 2019 05:53 )             33.0     14 Jun 2019 06:53    143    |  107    |  24     ----------------------------<  103    3.6     |  28     |  0.93   13 Jun 2019 06:19    145    |  109    |  25     ----------------------------<  105    3.4     |  29     |  0.96   12 Jun 2019 05:53    146    |  112    |  28     ----------------------------<  111    3.1     |  25     |  1.00     Ca    7.9        14 Jun 2019 06:53  Ca    8.3        13 Jun 2019 06:19  Ca    8.0        12 Jun 2019 05:53  Mg     1.9       12 Jun 2019 05:53    TPro  5.3    /  Alb  2.4    /  TBili  1.3    /  DBili  .30    /  AST  22     /  ALT  19     /  AlkPhos  57     14 Jun 2019 06:53  TPro  5.9    /  Alb  3.0    /  TBili  1.5    /  DBili  .30    /  AST  27     /  ALT  24     /  AlkPhos  61     12 Jun 2019 05:53      from: CT Angio Chest w/ IV Cont (06.08.19 @ 13:02) >    EXAM:  CT ANGIO CHEST (W)AW IC                          EXAM:  CT ANGIO ABD PELV (W)AW IC                          PROCEDURE DATE:  06/08/2019        INTERPRETATION:  CTA CHEST/ABDOMEN/PELVIS:     CLINICAL INFORMATION: Pain with abdominal aortic aneurysm. Rule out   rupture    COMPARISON: CT abdomen and pelvis of 1/13/2019 and older study of   7/21/2018..    PROCEDURE:  Using multislice helical CT, 2.5 mm sections were obtained   from the thoracic inlet through the ischial tuberosities withthe   administration of intravenous contrast. Oral contrast was not   administered. 95 cc of Omnipaque 350 were administered. MIP sequences are   also performed. Preliminary noncontrast imaging was also performed.    Coronal and sagittal reformationswere performed by  CT technologist and   made available for review.    FINDINGS:  The visualized structures of the lower neck are unremarkable.   The visualized aorta is of normal course and caliber. The heart is not   enlarged. There is no evidence of pericardial effusion. There is   atherosclerotic calcification of the thoracic aorta and its branches.   There is tracheobronchial calcification. There is coronary artery   calcification.    There is no evidence of axillary, hilar, or mediastinal lymphadenopathy.    No focal lung consolidations identified. No evidence of pleural effusion   or pneumothorax. No pulmonary nodules identified. There is extensive   centrilobular emphysema. There is scarring at the lung bases with   probable honeycombing at the right lung base      The liver is of normal contour. there is no evidence of mass. The   gallbladder is remarkable for multiple calcified gallstones No evidence   of intra or extrahepatic biliary dilatation.     The pancreas, spleen, and adrenal glands are grossly unremarkable. There   is no evidence of hydronephrosis or perinephric stranding. No evidence of    nephrolithiasis.The bladder is unremarkable. There is nonobstructive   right lower pole renal calculus measuring 7 mm. There isno   hydronephrosis.    No evidence of lymphadenopathy utilizing CT size criteria. The aorta   demonstrates saccular aneurysm of the mid aorta just below the level of   the renal arteries with a configuration similar to that seen on the   previous study. There is atherosclerotic calcification of the abdominal   aorta and its branches. A stent is seen on the right at the level of the   external iliac artery with graft extending to the groin.    There is no evidence of bowel obstruction. There is no evidence of   pneumoperitoneum or free fluid. There is diverticulosis without   diverticulitis. Surgical clips are seen in the left pelvis. Surgical   sutures are again seen in the right lower quadrant and in the small bowel    The visualized osseous structures demonstrate osteopenia and degenerative   changes of the spine. There is bilateral hip ORIF.    IMPRESSION:  No significant change in the appearance of infrarenal   abdominal aortic aneurysm without evidence of leakage.  Small calcified gallstones are again noted  Nonobstructive right lower pole renal calculus is again seen in  Postsurgical changes with surgical clips in the left pelvis and right   lower quadrant small bowel sutures.  Right external iliac stent with graft extending into the right groin.   There is no flow seen within the stent/graft similar to the prior   contrast-enhanced study suggesting occlusion.    SHIMON TOSCANO M.D.,ATTENDING RADIOLOGIST  This document has been electronically signed. Jun 8 2019  1:22PM      < end of copied text >    < from: TTE Echo Doppler w/o Cont (02.05.18 @ 08:11) >     EXAM:  ECHO TTE W/O CON COMP W/DOPPLR         PROCEDURE DATE:  02/05/2018        INTERPRETATION:  INDICATION: respiratory failure  Referring M.D.:Hinds  Blood Pressure 126/59        Weight (kg) :49     Height (cm):157       BSA (sq m): 1.47  Technician: JOJO    Dimensions:    LA 3.4       Normal Values: 2.0 - 4.0 cm    Ao 3.0        Normal Values: 2.0 - 3.8 cm  SEPTUM 0.8       Normal Values: 0.6 - 1.2 cm  PWT 0.8       Normal Values: 0.6 - 1.1 cm  LVIDd 3.4         Normal Values: 3.0 - 5.6 cm  LVIDs 2.3         Normal Values: 1.8 - 4.0 cm    OBSERVATIONS:  Technically difficult bedside ICU study. The patient is vented  Mitral Valve: Calcified mitral annulus and mitral valve leaflets with   normal opening. Mild mitral regurgitation.  Aortic Valve/Aorta: Calcified trileaflet aortic valve with normal opening.  Tricuspid Valve: Calcified trileaflet cusp at annulus with normally   opening valve leaflets. Mild tricuspid regurgitation.  Pulmonic Valve: The pulmonic valve is not well visualized. Probably   normal. Mild PI  Left Atrium: Small to moderate left atrial enlargement  Right Atrium: Normal  Left Ventricle: The endocardium is not well-visualized. There appears to   be grossly preserved left ventricular systolic function. There appears to   be concentric left ventricular hypertrophy. The EF is approximately 65%.  Right Ventricle: Normal right ventricular size and function. There is a   device wire noted in the right heart  Pericardium/Pleura: No pericardial effusion noted. Left pleural effusion   noted  Pulmonary/RV Pressure: The right ventricular systolic pressure is   estimated to be 41mmHg, assuming that the right atrial pressure is   estimated to be 8 mmHg. This is consistent with mild pulmonary   hypertension.  LV Diastolic Function: Stage II diastolic dysfunction    Conclusion: Likely difficult and limited study. Grossly preserved left   ventricular systolic function. Sensation diastolic dysfunction. Left   pleural effusion is noted.    KIET NIETO M.D., ATTENDING CARDIOLOGIST  This document has been electronically signed. Feb 6 2018  7:49PM  < from: TTE Echo Doppler w/o Cont (06.10.19 @ 14:06) >  XAM:  ECHO TTE WO CON COMP W DOPPLR         PROCEDURE DATE:  06/10/2019        INTERPRETATION:  INDICATION: NSTEMI    Blood Pressure 157/82    Height 154.9 cm     Weight 52.3 kg       BSA   1.49 sq m    Dimensions:    LA 3.6       Normal Values: 2.0 - 4.0 cm    Ao 2.9        Normal Values: 2.0 - 3.8 cm  SEPTUM 1.0       Normal Values: 0.6 - 1.2 cm  PWT 0.9       Normal Values: 0.6 - 1.1 cm  LVIDd 4.2         Normal Values: 3.0 - 5.6 cm  LVIDs 3.4         Normal Values: 1.8 - 4.0 cm      OBSERVATIONS:  Technically difficult study  Mitral Valve: Thickened leaflets, moderate MR.  Aortic Valve/Aorta: Sclerotic trileaflet aortic valve. Mild AI  Tricuspid Valve: normal with mild TR.  Pulmonic Valve: Mild PI  Left Atrium: Enlarged  Right Atrium:normal  Left Ventricle: Endocardium not well-visualized, estimated LVEF of   45-50%. Cannot rule out segmental dysfunction  Right Ventricle: Not well-visualized  Pericardium/Pleura: normal, no significant pericardial effusion.  Pulmonary/RV Pressure:estimated PA systolic pressure of 43 mmHg assuming   an RA pressure of 10 mmHg.  LV diastolic dysfunction is present  Small right-sided pleural effusion    Conclusion:   Technically difficult study  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 Cannot rule out segmental dysfunction  Left atrial enlargement  Sclerotic trileaflet aortic valve. Mild AI  Thickened mitral valve leaflets with moderate MR  Mild TR  Small right-sided pleural effusion          JACQUELINE GOODEN   This document has been electronically signed. Jun 11 2019  8:16AM             Campbell Juan [facility]   Cardiology

## 2019-06-14 NOTE — PROGRESS NOTE ADULT - SUBJECTIVE AND OBJECTIVE BOX
Patient is a 84y old  Female who presents with a chief complaint of Melena, weakness, GIB (11 Jun 2019 18:42)      INTERVAL HPI/OVERNIGHT EVENTS: Patient seen and examined at bedside. Patient complaining of nausea and epigastric pain. Abdominal XR, liver enzymes wnl, will order suppository. Will continue to monitor. Patient also admits of not having a BM since 6 days ago.     MEDICATIONS  (STANDING):  acetaminophen   Tablet .. 650 milliGRAM(s) Oral every 6 hours  ALBUTerol    0.083% 2.5 milliGRAM(s) Nebulizer every 8 hours  atorvastatin 20 milliGRAM(s) Oral at bedtime  chlorhexidine 2% Cloths 1 Application(s) Topical <User Schedule>  diltiazem    milliGRAM(s) Oral daily  docusate sodium 100 milliGRAM(s) Oral three times a day  fluconAZOLE   Tablet 100 milliGRAM(s) Oral daily  lisinopril 2.5 milliGRAM(s) Oral daily  metoprolol succinate  milliGRAM(s) Oral two times a day  pantoprazole    Tablet 40 milliGRAM(s) Oral every 12 hours  polyethylene glycol 3350 17 Gram(s) Oral daily  senna 2 Tablet(s) Oral at bedtime    MEDICATIONS  (PRN):  morphine  - Injectable 1 milliGRAM(s) IV Push every 6 hours PRN Severe Pain (7 - 10)  ondansetron Injectable 4 milliGRAM(s) IV Push once PRN Nausea and/or Vomiting  oxyCODONE    IR 5 milliGRAM(s) Oral every 4 hours PRN Moderate Pain (4 - 6)      Allergies    No Known Allergies    Intolerances        REVIEW OF SYSTEMS:  CONSTITUTIONAL: No fever   HEENT: No headache, no sore throat  RESPIRATORY: No cough, wheezing, or shortness of breath  CARDIOVASCULAR: No chest pain, palpitations, or leg swelling  GASTROINTESTINAL: + abd pain, nausea, denies vomiting, or diarrhea  GENITOURINARY: No dysuria, frequency, or hematuria  NEUROLOGICAL: No focal weakness or dizziness  MUSCULOSKELETAL: No myalgias     Vital Signs Last 24 Hrs  T(C): 36.8 (14 Jun 2019 12:01), Max: 37.1 (13 Jun 2019 14:10)  T(F): 98.3 (14 Jun 2019 12:01), Max: 98.8 (13 Jun 2019 14:10)  HR: 73 (14 Jun 2019 12:01) (65 - 95)  BP: 109/72 (14 Jun 2019 12:01) (109/72 - 179/96)  BP(mean): --  RR: 17 (14 Jun 2019 12:01) (12 - 19)  SpO2: 96% (14 Jun 2019 12:01) (93% - 100%)    PHYSICAL EXAM:  GENERAL: NAD  HEENT: EOMI, moist mucous membranes  CHEST/LUNG: + crackles, no rales, wheezes, or rhonchi  HEART: Systolic murmur, S1, S2  ABDOMEN: BS+, soft, nontender, nondistended  EXTREMITIES: No edema, cyanosis, or calf tenderness  NERVOUS SYSTEM: AA&Ox3    LABS:                        11.4   14.69 )-----------( 150      ( 14 Jun 2019 06:53 )             35.0     CBC Full  -  ( 14 Jun 2019 06:53 )  WBC Count : 14.69 K/uL  Hemoglobin : 11.4 g/dL  Hematocrit : 35.0 %  Platelet Count - Automated : 150 K/uL  Mean Cell Volume : 91.6 fl  Mean Cell Hemoglobin : 29.8 pg  Mean Cell Hemoglobin Concentration : 32.6 gm/dL  Auto Neutrophil # : 12.40 K/uL  Auto Lymphocyte # : 0.82 K/uL  Auto Monocyte # : 1.31 K/uL  Auto Eosinophil # : 0.08 K/uL  Auto Basophil # : 0.02 K/uL  Auto Neutrophil % : 84.5 %  Auto Lymphocyte % : 5.6 %  Auto Monocyte % : 8.9 %  Auto Eosinophil % : 0.5 %  Auto Basophil % : 0.1 %    14 Jun 2019 06:53    143    |  107    |  24     ----------------------------<  103    3.6     |  28     |  0.93     Ca    7.9        14 Jun 2019 06:53    TPro  5.3    /  Alb  2.4    /  TBili  1.3    /  DBili  .30    /  AST  22     /  ALT  19     /  AlkPhos  57     14 Jun 2019 06:53        CAPILLARY BLOOD GLUCOSE            Culture - Blood (collected 06-09-19 @ 18:15)  Source: .Blood Blood-Venous  Preliminary Report (06-10-19 @ 19:00):    No growth to date.    Culture - Blood (collected 06-09-19 @ 18:15)  Source: .Blood Blood-Venous  Preliminary Report (06-10-19 @ 19:00):    No growth to date.    Culture - Blood (collected 06-08-19 @ 16:36)  Source: .Blood Blood-Peripheral  Gram Stain (06-09-19 @ 10:02):    Growth in aerobic bottle: Gram Positive Cocci in Clusters  Final Report (06-10-19 @ 11:50):    Growth in aerobic bottle: Coag Negative Staphylococcus    Single set isolate, possible contaminant. Contact    Microbiology if susceptibility testing clinically    indicated.    "Due to technical problems, Proteus sp. will Not be reported as part of    the BCID panel until further notice"    ***Blood Panel PCR results on this specimen are available    approximately 3 hours after the Gram stain result.***    Gram stain, PCR, and/or culture results may not always    correspond due to difference in methodologies.    ************************************************************    This PCR assay was performed using [employer].    The following targets are tested for: Enterococcus,    vancomycin resistant enterococci, Listeria monocytogenes,    coagulase negative staphylococci, S. aureus,    methicillin resistant S. aureus, Streptococcus agalactiae    (Group B), S. pneumoniae, S. pyogenes (Group A),    Acinetobacter baumannii, Enterobacter cloacae, E. coli,    Klebsiella oxytoca, K. pneumoniae, Proteus sp.,    Serratia marcescens, Haemophilus influenzae,    Neisseria meningitidis, Pseudomonas aeruginosa, Candida    albicans, C. glabrata, C krusei, C parapsilosis,    C. tropicalis and the KPC resistance gene.  Organism: Blood Culture PCR (06-10-19 @ 11:50)  Organism: Blood Culture PCR (06-10-19 @ 11:50)      -  Coagulase negative Staphylococcus: Detec      Method Type: PCR    Culture - Blood (collected 06-08-19 @ 16:36)  Source: .Blood Blood-Peripheral  Final Report (06-13-19 @ 17:00):    No growth at 5 days.        RADIOLOGY & ADDITIONAL TESTS:    Personally reviewed.     Consultant(s) Notes Reviewed:  [x] YES  [ ] NO

## 2019-06-14 NOTE — PROVIDER CONTACT NOTE (OTHER) - BACKGROUND
At 1220 c/o increase in abd pain and noted tenderness to palpation.  Carlo Adame notified .  Pt assisted back to bed and pt fell asleep.  Abd x ray ordered and completed

## 2019-06-14 NOTE — PROGRESS NOTE ADULT - SUBJECTIVE AND OBJECTIVE BOX
INTERVAL HPI/OVERNIGHT EVENTS:  pt seen and examined  sp egd yesterday  states still w abd pain but improved  mild nausea  no vomiting  +gas no bm    MEDICATIONS  (STANDING):  acetaminophen   Tablet .. 650 milliGRAM(s) Oral every 6 hours  ALBUTerol    0.083% 2.5 milliGRAM(s) Nebulizer every 8 hours  atorvastatin 20 milliGRAM(s) Oral at bedtime  chlorhexidine 2% Cloths 1 Application(s) Topical <User Schedule>  diltiazem    milliGRAM(s) Oral daily  fluconAZOLE   Tablet 100 milliGRAM(s) Oral daily  lisinopril 2.5 milliGRAM(s) Oral daily  metoprolol succinate  milliGRAM(s) Oral two times a day  pantoprazole    Tablet 40 milliGRAM(s) Oral every 12 hours  sucralfate 1 Gram(s) Oral two times a day    MEDICATIONS  (PRN):  morphine  - Injectable 1 milliGRAM(s) IV Push every 6 hours PRN Severe Pain (7 - 10)  oxyCODONE    IR 5 milliGRAM(s) Oral every 4 hours PRN Moderate Pain (4 - 6)      Allergies    No Known Allergies    Intolerances        Review of Systems:    General:  No wt loss, fevers, chills, night sweats, fatigue   Eyes:  Good vision, no reported pain  ENT:  No sore throat, pain, runny nose, dysphagia  CV:  No pain, palpitations, hypo/hypertension  Resp:  No dyspnea, cough, tachypnea, wheezing  GI:  +pain, +nausea, No vomiting, No diarrhea, No constipation, No weight loss, No fever, No pruritis, No rectal bleeding, No melena, No dysphagia  :  No pain, bleeding, incontinence, nocturia  Muscle:  No pain, weakness  Neuro:  No weakness, tingling, memory problems  Psych:  No fatigue, insomnia, mood problems, depression  Endocrine:  No polyuria, polydypsia, cold/heat intolerance  Heme:  No petechiae, ecchymosis, easy bruisability  Skin:  No rash, tattoos, scars, edema      Vital Signs Last 24 Hrs  T(C): 36.8 (14 Jun 2019 07:36), Max: 37.1 (13 Jun 2019 14:10)  T(F): 98.2 (14 Jun 2019 07:36), Max: 98.8 (13 Jun 2019 14:10)  HR: 78 (14 Jun 2019 07:37) (65 - 95)  BP: 160/81 (14 Jun 2019 07:36) (136/81 - 179/96)  BP(mean): --  RR: 18 (14 Jun 2019 07:36) (12 - 19)  SpO2: 98% (14 Jun 2019 07:37) (93% - 100%)    PHYSICAL EXAM:    Constitutional: lying in bed  HEENT: ncat  Neck: No LAD  Respiratory:  dec bs  Cardiovascular: S1 and S2, RRR  Gastrointestinal: soft mild epigastric ttp no guarding mild dt  Extremities: No edema  Vascular: 2+ peripheral pulses  Neurological: Awake alert responds appropriately  Skin: No rashes      LABS:                        11.4   14.69 )-----------( 150      ( 14 Jun 2019 06:53 )             35.0     06-14    143  |  107  |  24<H>  ----------------------------<  103<H>  3.6   |  28  |  0.93    Ca    7.9<L>      14 Jun 2019 06:53            RADIOLOGY & ADDITIONAL TESTS:

## 2019-06-14 NOTE — PHARMACOTHERAPY INTERVENTION NOTE - COMMENTS
s/w Dr. Mckay and suggested giving a lower dose of warfarin due to patient receiving IV vit K + Kcentra on admission for a bleed on the patient's outpatient warfarin dose.  MD would still like to continue with 5 mg

## 2019-06-14 NOTE — PHYSICAL THERAPY INITIAL EVALUATION ADULT - PERTINENT HX OF CURRENT PROBLEM, REHAB EVAL
As per H&P: "83 yo female w/ PMH of afib on coumadin, CVA and hx of aneurysm repair, HTN, HLD, mitral valve prolapse, PVD s/p stents x 3 (RLE), rheumatoid arthritis, cataracts comes with black stools and severe anemia, upper GIB"

## 2019-06-14 NOTE — PROVIDER CONTACT NOTE (OTHER) - ACTION/TREATMENT ORDERED:
No new orders/recommendations. Continue to monitor
pt due meds cardizem 120mg + toprol xl  @ 6am given , will repeat after an hour
Awaiting resultsof abd x ray.
Spoke to cardio NP  Campbell Verdugo, he is to evaluate and write orders.

## 2019-06-14 NOTE — PHYSICAL THERAPY INITIAL EVALUATION ADULT - PHYSICAL ASSIST/NONPHYSICAL ASSIST: SIT/STAND, REHAB EVAL
verbal cues/nonverbal cues (demo/gestures)/set-up required/1 person assist/Cues for safety, hand placement

## 2019-06-14 NOTE — PHYSICAL THERAPY INITIAL EVALUATION ADULT - GAIT DEVIATIONS NOTED, PT EVAL
decreased step length/decreased stride length/decreased weight-shifting ability/decreased mirian/increased time in double stance

## 2019-06-14 NOTE — PROGRESS NOTE ADULT - PROBLEM SELECTOR PLAN 1
cta a/p neg for acute gi pathology  sp egd- findings of candida esophagitis, gastritis and bile reflux; no stigmata of recent bleeding  cont diflucan x 5 days  cont ppi bid, anti emetics prn  monitor cbc, transfuse prn  diet as tolerated  monitor exam, prn pain control  gentle bowel regimen  will need op capsule

## 2019-06-14 NOTE — PROGRESS NOTE ADULT - PROBLEM SELECTOR PLAN 1
seen and examined, vs and meds reviewed, labs reviewed,   on Diflucan for Candida Esophagitis - EGD done - see GI notes  GI Follow up noted, remains on PPI BID for GIB  no objection from Pulmonary  PPI  off AC  on NEBS for COPD Emphysema  O2 support as needed  keep sat > 88 pct  assist with ADL  oral hygiene  skin care  monitor vs and HD and sat  serial hgb and labs  will follow  medical notes and follow ups noted.

## 2019-06-14 NOTE — PROGRESS NOTE ADULT - SUBJECTIVE AND OBJECTIVE BOX
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  Osteoarthritis  Afib  PVD (peripheral vascular disease)  Neuropathy: (Right lower leg)  Gout  H/O cerebral aneurysm repair: brain clips  CVA (cerebral vascular accident): (&quot;Mini-stroke&quot;,1990&#x27;s)  Rheumatoid arthritis  Asthma  Hyperlipidemia  Hypertension  S/P cataract surgery: (Left eye)  Elective surgery: (&quot;Twisted bowel&quot;, 2014)  Elective surgery: (Exision of cyst on liver, 1985)  S/P ORIF (open reduction internal fixation) fracture: Left hip fx (2012) &amp; R hip fx (2013)  H/O cerebral aneurysm repair: Brain clips (1978(  Renal stone: Cysto stent placement 10/1/2014  PVD (peripheral vascular disease): s/p RLE bypass x 3, most recent 3/2012 Right external iliac to PT bypass w/ PTFE (2012)  S/P FRED (total abdominal hysterectomy): (1987, Hx of &quot;ovarian cancer?&quot;)        Medication list         MEDICATIONS  (STANDING):  acetaminophen   Tablet .. 650 milliGRAM(s) Oral every 6 hours  ALBUTerol    0.083% 2.5 milliGRAM(s) Nebulizer every 8 hours  atorvastatin 20 milliGRAM(s) Oral at bedtime  chlorhexidine 2% Cloths 1 Application(s) Topical <User Schedule>  diltiazem    milliGRAM(s) Oral daily  fluconAZOLE   Tablet 100 milliGRAM(s) Oral daily  lisinopril 2.5 milliGRAM(s) Oral daily  metoprolol succinate  milliGRAM(s) Oral two times a day  pantoprazole  Injectable 40 milliGRAM(s) IV Push two times a day  sucralfate 1 Gram(s) Oral two times a day    MEDICATIONS  (PRN):  morphine  - Injectable 1 milliGRAM(s) IV Push every 6 hours PRN Severe Pain (7 - 10)  oxyCODONE    IR 5 milliGRAM(s) Oral every 4 hours PRN Moderate Pain (4 - 6)         Vitals log        ICU Vital Signs Last 24 Hrs  T(C): 36.8 (14 Jun 2019 07:36), Max: 37.1 (13 Jun 2019 14:10)  T(F): 98.2 (14 Jun 2019 07:36), Max: 98.8 (13 Jun 2019 14:10)  HR: 78 (14 Jun 2019 07:37) (65 - 95)  BP: 160/81 (14 Jun 2019 07:36) (136/81 - 179/96)  BP(mean): --  ABP: --  ABP(mean): --  RR: 18 (14 Jun 2019 07:36) (12 - 19)  SpO2: 98% (14 Jun 2019 07:37) (93% - 100%)           Input and Output:  I&O's Detail    13 Jun 2019 07:01  -  14 Jun 2019 07:00  --------------------------------------------------------  IN:    Oral Fluid: 240 mL  Total IN: 240 mL    OUT:    Voided: 250 mL  Total OUT: 250 mL    Total NET: -10 mL          Lab Data                        11.4   14.69 )-----------( 150      ( 14 Jun 2019 06:53 )             35.0     06-14    143  |  107  |  24<H>  ----------------------------<  103<H>  3.6   |  28  |  0.93    Ca    7.9<L>      14 Jun 2019 06:53              Review of Systems	      Objective     Physical Examination    heart s1s2  lung dec BS  abd soft      Pertinent Lab findings & Imaging      Fransico:  NO   Adequate UO     I&O's Detail    13 Jun 2019 07:01  -  14 Jun 2019 07:00  --------------------------------------------------------  IN:    Oral Fluid: 240 mL  Total IN: 240 mL    OUT:    Voided: 250 mL  Total OUT: 250 mL    Total NET: -10 mL               Discussed with:     Cultures:	        Radiology

## 2019-06-14 NOTE — PROGRESS NOTE ADULT - PROBLEM SELECTOR PLAN 2
no liver/gb pathology on ct  possibly ischemia induced  trend labs, avoid hepatotoxins no liver/gb pathology on ct  possibly ischemia induced  recheck lfts

## 2019-06-15 DIAGNOSIS — R10.9 UNSPECIFIED ABDOMINAL PAIN: ICD-10-CM

## 2019-06-15 LAB
ALBUMIN SERPL ELPH-MCNC: 2.3 G/DL — LOW (ref 3.3–5)
ALP SERPL-CCNC: 59 U/L — SIGNIFICANT CHANGE UP (ref 40–120)
ALT FLD-CCNC: 24 U/L — SIGNIFICANT CHANGE UP (ref 12–78)
ANION GAP SERPL CALC-SCNC: 6 MMOL/L — SIGNIFICANT CHANGE UP (ref 5–17)
APTT BLD: 27.9 SEC — SIGNIFICANT CHANGE UP (ref 27.5–36.3)
AST SERPL-CCNC: 41 U/L — HIGH (ref 15–37)
BASOPHILS # BLD AUTO: 0.02 K/UL — SIGNIFICANT CHANGE UP (ref 0–0.2)
BASOPHILS NFR BLD AUTO: 0.2 % — SIGNIFICANT CHANGE UP (ref 0–2)
BILIRUB SERPL-MCNC: 1.1 MG/DL — SIGNIFICANT CHANGE UP (ref 0.2–1.2)
BUN SERPL-MCNC: 27 MG/DL — HIGH (ref 7–23)
CALCIUM SERPL-MCNC: 8.1 MG/DL — LOW (ref 8.5–10.1)
CHLORIDE SERPL-SCNC: 106 MMOL/L — SIGNIFICANT CHANGE UP (ref 96–108)
CO2 SERPL-SCNC: 30 MMOL/L — SIGNIFICANT CHANGE UP (ref 22–31)
CREAT SERPL-MCNC: 1 MG/DL — SIGNIFICANT CHANGE UP (ref 0.5–1.3)
EOSINOPHIL # BLD AUTO: 0.18 K/UL — SIGNIFICANT CHANGE UP (ref 0–0.5)
EOSINOPHIL NFR BLD AUTO: 1.5 % — SIGNIFICANT CHANGE UP (ref 0–6)
GLUCOSE SERPL-MCNC: 112 MG/DL — HIGH (ref 70–99)
HCT VFR BLD CALC: 33.9 % — LOW (ref 34.5–45)
HGB BLD-MCNC: 10.8 G/DL — LOW (ref 11.5–15.5)
IMM GRANULOCYTES NFR BLD AUTO: 0.4 % — SIGNIFICANT CHANGE UP (ref 0–1.5)
INR BLD: 1.12 RATIO — SIGNIFICANT CHANGE UP (ref 0.88–1.16)
LIDOCAIN IGE QN: 124 U/L — SIGNIFICANT CHANGE UP (ref 73–393)
LYMPHOCYTES # BLD AUTO: 0.83 K/UL — LOW (ref 1–3.3)
LYMPHOCYTES # BLD AUTO: 6.9 % — LOW (ref 13–44)
MCHC RBC-ENTMCNC: 29.8 PG — SIGNIFICANT CHANGE UP (ref 27–34)
MCHC RBC-ENTMCNC: 31.9 GM/DL — LOW (ref 32–36)
MCV RBC AUTO: 93.4 FL — SIGNIFICANT CHANGE UP (ref 80–100)
MONOCYTES # BLD AUTO: 0.95 K/UL — HIGH (ref 0–0.9)
MONOCYTES NFR BLD AUTO: 7.8 % — SIGNIFICANT CHANGE UP (ref 2–14)
NEUTROPHILS # BLD AUTO: 10.08 K/UL — HIGH (ref 1.8–7.4)
NEUTROPHILS NFR BLD AUTO: 83.2 % — HIGH (ref 43–77)
NRBC # BLD: 0 /100 WBCS — SIGNIFICANT CHANGE UP (ref 0–0)
PLATELET # BLD AUTO: 172 K/UL — SIGNIFICANT CHANGE UP (ref 150–400)
POTASSIUM SERPL-MCNC: 3.5 MMOL/L — SIGNIFICANT CHANGE UP (ref 3.5–5.3)
POTASSIUM SERPL-SCNC: 3.5 MMOL/L — SIGNIFICANT CHANGE UP (ref 3.5–5.3)
PROT SERPL-MCNC: 5.4 G/DL — LOW (ref 6–8.3)
PROTHROM AB SERPL-ACNC: 12.7 SEC — SIGNIFICANT CHANGE UP (ref 10–12.9)
RBC # BLD: 3.63 M/UL — LOW (ref 3.8–5.2)
RBC # FLD: 17.1 % — HIGH (ref 10.3–14.5)
SODIUM SERPL-SCNC: 142 MMOL/L — SIGNIFICANT CHANGE UP (ref 135–145)
WBC # BLD: 12.11 K/UL — HIGH (ref 3.8–10.5)
WBC # FLD AUTO: 12.11 K/UL — HIGH (ref 3.8–10.5)

## 2019-06-15 PROCEDURE — 99233 SBSQ HOSP IP/OBS HIGH 50: CPT | Mod: 25

## 2019-06-15 PROCEDURE — 99497 ADVNCD CARE PLAN 30 MIN: CPT

## 2019-06-15 PROCEDURE — 99232 SBSQ HOSP IP/OBS MODERATE 35: CPT

## 2019-06-15 RX ORDER — GLYCERIN ADULT
1 SUPPOSITORY, RECTAL RECTAL AT BEDTIME
Refills: 0 | Status: DISCONTINUED | OUTPATIENT
Start: 2019-06-15 | End: 2019-06-17

## 2019-06-15 RX ORDER — WARFARIN SODIUM 2.5 MG/1
5 TABLET ORAL ONCE
Refills: 0 | Status: COMPLETED | OUTPATIENT
Start: 2019-06-15 | End: 2019-06-15

## 2019-06-15 RX ORDER — LANOLIN ALCOHOL/MO/W.PET/CERES
5 CREAM (GRAM) TOPICAL ONCE
Refills: 0 | Status: COMPLETED | OUTPATIENT
Start: 2019-06-15 | End: 2019-06-15

## 2019-06-15 RX ORDER — LACTULOSE 10 G/15ML
30 SOLUTION ORAL
Refills: 0 | Status: DISCONTINUED | OUTPATIENT
Start: 2019-06-15 | End: 2019-06-17

## 2019-06-15 RX ADMIN — Medication 81 MILLIGRAM(S): at 12:51

## 2019-06-15 RX ADMIN — Medication 650 MILLIGRAM(S): at 05:24

## 2019-06-15 RX ADMIN — Medication 100 MILLIGRAM(S): at 05:24

## 2019-06-15 RX ADMIN — Medication 650 MILLIGRAM(S): at 06:09

## 2019-06-15 RX ADMIN — Medication 100 MILLIGRAM(S): at 18:42

## 2019-06-15 RX ADMIN — ATORVASTATIN CALCIUM 20 MILLIGRAM(S): 80 TABLET, FILM COATED ORAL at 22:20

## 2019-06-15 RX ADMIN — CHLORHEXIDINE GLUCONATE 1 APPLICATION(S): 213 SOLUTION TOPICAL at 05:24

## 2019-06-15 RX ADMIN — PANTOPRAZOLE SODIUM 40 MILLIGRAM(S): 20 TABLET, DELAYED RELEASE ORAL at 18:42

## 2019-06-15 RX ADMIN — LACTULOSE 30 GRAM(S): 10 SOLUTION ORAL at 18:38

## 2019-06-15 RX ADMIN — Medication 650 MILLIGRAM(S): at 12:55

## 2019-06-15 RX ADMIN — POLYETHYLENE GLYCOL 3350 17 GRAM(S): 17 POWDER, FOR SOLUTION ORAL at 12:50

## 2019-06-15 RX ADMIN — Medication 5 MILLIGRAM(S): at 22:20

## 2019-06-15 RX ADMIN — Medication 120 MILLIGRAM(S): at 05:24

## 2019-06-15 RX ADMIN — Medication 650 MILLIGRAM(S): at 13:45

## 2019-06-15 RX ADMIN — PANTOPRAZOLE SODIUM 40 MILLIGRAM(S): 20 TABLET, DELAYED RELEASE ORAL at 05:24

## 2019-06-15 RX ADMIN — ALBUTEROL 2.5 MILLIGRAM(S): 90 AEROSOL, METERED ORAL at 07:30

## 2019-06-15 RX ADMIN — ALBUTEROL 2.5 MILLIGRAM(S): 90 AEROSOL, METERED ORAL at 14:22

## 2019-06-15 RX ADMIN — Medication 650 MILLIGRAM(S): at 01:35

## 2019-06-15 RX ADMIN — FLUCONAZOLE 100 MILLIGRAM(S): 150 TABLET ORAL at 12:51

## 2019-06-15 RX ADMIN — WARFARIN SODIUM 5 MILLIGRAM(S): 2.5 TABLET ORAL at 22:21

## 2019-06-15 RX ADMIN — LISINOPRIL 2.5 MILLIGRAM(S): 2.5 TABLET ORAL at 05:24

## 2019-06-15 RX ADMIN — Medication 100 MILLIGRAM(S): at 14:14

## 2019-06-15 RX ADMIN — ALBUTEROL 2.5 MILLIGRAM(S): 90 AEROSOL, METERED ORAL at 19:11

## 2019-06-15 NOTE — PROGRESS NOTE ADULT - SUBJECTIVE AND OBJECTIVE BOX
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  Osteoarthritis  Afib  PVD (peripheral vascular disease)  Neuropathy: (Right lower leg)  Gout  H/O cerebral aneurysm repair: brain clips  CVA (cerebral vascular accident): (&quot;Mini-stroke&quot;,1990&#x27;s)  Rheumatoid arthritis  Asthma  Hyperlipidemia  Hypertension  S/P cataract surgery: (Left eye)  Elective surgery: (&quot;Twisted bowel&quot;, 2014)  Elective surgery: (Exision of cyst on liver, 1985)  S/P ORIF (open reduction internal fixation) fracture: Left hip fx (2012) &amp; R hip fx (2013)  H/O cerebral aneurysm repair: Brain clips (1978(  Renal stone: Cysto stent placement 10/1/2014  PVD (peripheral vascular disease): s/p RLE bypass x 3, most recent 3/2012 Right external iliac to PT bypass w/ PTFE (2012)  S/P FRED (total abdominal hysterectomy): (1987, Hx of &quot;ovarian cancer?&quot;)        Medication list         MEDICATIONS  (STANDING):  acetaminophen   Tablet .. 650 milliGRAM(s) Oral every 6 hours  ALBUTerol    0.083% 2.5 milliGRAM(s) Nebulizer every 8 hours  aspirin  chewable 81 milliGRAM(s) Oral daily  atorvastatin 20 milliGRAM(s) Oral at bedtime  chlorhexidine 2% Cloths 1 Application(s) Topical <User Schedule>  diltiazem    milliGRAM(s) Oral daily  docusate sodium 100 milliGRAM(s) Oral three times a day  fluconAZOLE   Tablet 100 milliGRAM(s) Oral daily  lisinopril 2.5 milliGRAM(s) Oral daily  metoprolol succinate  milliGRAM(s) Oral two times a day  pantoprazole    Tablet 40 milliGRAM(s) Oral every 12 hours  polyethylene glycol 3350 17 Gram(s) Oral daily  senna 2 Tablet(s) Oral at bedtime    MEDICATIONS  (PRN):  morphine  - Injectable 1 milliGRAM(s) IV Push every 6 hours PRN Severe Pain (7 - 10)  ondansetron Injectable 4 milliGRAM(s) IV Push once PRN Nausea and/or Vomiting  oxyCODONE    IR 5 milliGRAM(s) Oral every 4 hours PRN Moderate Pain (4 - 6)         Vitals log        ICU Vital Signs Last 24 Hrs  T(C): 37 (15 Osmani 2019 05:51), Max: 37.1 (14 Jun 2019 20:05)  T(F): 98.6 (15 Osmani 2019 05:51), Max: 98.7 (14 Jun 2019 20:05)  HR: 70 (15 Osmani 2019 05:51) (65 - 84)  BP: 147/81 (15 Osmani 2019 05:51) (109/72 - 160/81)  BP(mean): --  ABP: --  ABP(mean): --  RR: 17 (15 Osmani 2019 05:51) (17 - 18)  SpO2: 100% (15 Osmani 2019 05:51) (96% - 100%)           Input and Output:  I&O's Detail    13 Jun 2019 07:01  -  14 Jun 2019 07:00  --------------------------------------------------------  IN:    Oral Fluid: 240 mL  Total IN: 240 mL    OUT:    Voided: 250 mL  Total OUT: 250 mL    Total NET: -10 mL      14 Jun 2019 07:01  -  15 Osmani 2019 06:51  --------------------------------------------------------  IN:    Oral Fluid: 720 mL  Total IN: 720 mL    OUT:    Voided: 750 mL  Total OUT: 750 mL    Total NET: -30 mL          Lab Data                        11.4   14.69 )-----------( 150      ( 14 Jun 2019 06:53 )             35.0     06-14    143  |  107  |  24<H>  ----------------------------<  103<H>  3.6   |  28  |  0.93    Ca    7.9<L>      14 Jun 2019 06:53    TPro  5.3<L>  /  Alb  2.4<L>  /  TBili  1.3<H>  /  DBili  .30<H>  /  AST  22  /  ALT  19  /  AlkPhos  57  06-14            Review of Systems	      Objective     Physical Examination    heart s1s2  lung dec BS      Pertinent Lab findings & Imaging      Fransico:  NO   Adequate UO     I&O's Detail    13 Jun 2019 07:01  -  14 Jun 2019 07:00  --------------------------------------------------------  IN:    Oral Fluid: 240 mL  Total IN: 240 mL    OUT:    Voided: 250 mL  Total OUT: 250 mL    Total NET: -10 mL      14 Jun 2019 07:01  -  15 Jun 2019 06:51  --------------------------------------------------------  IN:    Oral Fluid: 720 mL  Total IN: 720 mL    OUT:    Voided: 750 mL  Total OUT: 750 mL    Total NET: -30 mL               Discussed with:     Cultures:	        Radiology

## 2019-06-15 NOTE — PROGRESS NOTE ADULT - PROBLEM SELECTOR PLAN 4
Resumed coumadin as per GI no containdication.  Monitor INR  monitor electrolytes closely K>4 Mg >2  Metoprolol po with hold parameters   Cardiology following

## 2019-06-15 NOTE — PROGRESS NOTE ADULT - SUBJECTIVE AND OBJECTIVE BOX
[facility] -- Dahiana Bañuelos, Toney Horner, Truman Falcon Savella  Office # [phone]      Follow Up:    Anemia, NSTEMI  Subjective/Observations:   No events overnight resting comfortably in bed.  No complaints of chest pain, dyspnea, or palpitations reported. No signs of orthopnea or PND. 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  Osteoarthritis  Afib  PVD (peripheral vascular disease)  Neuropathy: (Right lower leg)  H/O cerebral aneurysm repair: brain clips  CVA (cerebral vascular accident): (&quot;Mini-stroke&quot;,1990&#x27;s)  Rheumatoid arthritis  Hyperlipidemia  Hypertension  S/P cataract surgery: (Left eye)  Elective surgery: (&quot;Twisted bowel&quot;, 2014)  Elective surgery: (Exision of cyst on liver, 1985)  S/P ORIF (open reduction internal fixation) fracture: Left hip fx (2012) &amp; R hip fx (2013)  H/O cerebral aneurysm repair: Brain clips (1978(  Renal stone: Cysto stent placement 10/1/2014  PVD (peripheral vascular disease): s/p RLE bypass x 3, most recent 3/2012 Right external iliac to PT bypass w/ PTFE (2012)  S/P FRED (total abdominal hysterectomy): (1987, Hx of &quot;ovarian cancer?&quot;)      MEDICATIONS  (STANDING):  acetaminophen   Tablet .. 650 milliGRAM(s) Oral every 6 hours  ALBUTerol    0.083% 2.5 milliGRAM(s) Nebulizer every 8 hours  aspirin  chewable 81 milliGRAM(s) Oral daily  atorvastatin 20 milliGRAM(s) Oral at bedtime  chlorhexidine 2% Cloths 1 Application(s) Topical <User Schedule>  diltiazem    milliGRAM(s) Oral daily  docusate sodium 100 milliGRAM(s) Oral three times a day  fluconAZOLE   Tablet 100 milliGRAM(s) Oral daily  glycerin Suppository - Adult 1 Suppository(s) Rectal at bedtime  lactulose Syrup 30 Gram(s) Oral two times a day  lisinopril 2.5 milliGRAM(s) Oral daily  metoprolol succinate  milliGRAM(s) Oral two times a day  pantoprazole    Tablet 40 milliGRAM(s) Oral every 12 hours  polyethylene glycol 3350 17 Gram(s) Oral daily  senna 2 Tablet(s) Oral at bedtime    MEDICATIONS  (PRN):  aluminum hydroxide/magnesium hydroxide/simethicone Suspension 30 milliLiter(s) Oral every 4 hours PRN Dyspepsia  morphine  - Injectable 1 milliGRAM(s) IV Push every 6 hours PRN Severe Pain (7 - 10)  ondansetron Injectable 4 milliGRAM(s) IV Push once PRN Nausea and/or Vomiting  oxyCODONE    IR 5 milliGRAM(s) Oral every 4 hours PRN Moderate Pain (4 - 6)      Allergies    No Known Allergies    Intolerances        Vital Signs Last 24 Hrs  T(C): 36.5 (15 Osmani 2019 07:49), Max: 37.1 (14 Jun 2019 20:05)  T(F): 97.7 (15 Osmani 2019 07:49), Max: 98.7 (14 Jun 2019 20:05)  HR: 77 (15 Osmani 2019 07:49) (70 - 84)  BP: 147/74 (15 Osmani 2019 07:49) (109/72 - 149/83)  BP(mean): --  RR: 17 (15 Osmani 2019 07:49) (17 - 18)  SpO2: 97% (15 Osmani 2019 07:49) (96% - 100%)    I&O's Summary    14 Jun 2019 07:01  -  15 Osmani 2019 07:00  --------------------------------------------------------  IN: 720 mL / OUT: 750 mL / NET: -30 mL          PHYSICAL EXAM:  TELE: NSR  Constitutional: NAD, awake and alert, well-developed  HEENT: Moist Mucous Membranes, Anicteric  Pulmonary: Non-labored, breath sounds are clear bilaterally, No wheezing, crackles or rhonchi  Cardiovascular: Regular, S1 and S2 nl, No murmurs, rubs, gallops or clicks  Gastrointestinal: Bowel Sounds present, soft, nontender.   Lymph: No lymphadenopathy. No peripheral edema.  Skin: No visible rashes or ulcers.  Psych:  Mood & affect appropriate    LABS: All Labs Reviewed:                        10.8   12.11 )-----------( 172      ( 15 Osmani 2019 07:52 )             33.9                         11.4   14.69 )-----------( 150      ( 14 Jun 2019 06:53 )             35.0                         11.3   10.51 )-----------( 149      ( 13 Jun 2019 06:19 )             34.5     15 Osmani 2019 07:52    142    |  106    |  27     ----------------------------<  112    3.5     |  30     |  1.00   14 Jun 2019 06:53    143    |  107    |  24     ----------------------------<  103    3.6     |  28     |  0.93   13 Jun 2019 06:19    145    |  109    |  25     ----------------------------<  105    3.4     |  29     |  0.96     Ca    8.1        15 Osmani 2019 07:52  Ca    7.9        14 Jun 2019 06:53  Ca    8.3        13 Jun 2019 06:19    TPro  5.4    /  Alb  2.3    /  TBili  1.1    /  DBili  x      /  AST  41     /  ALT  24     /  AlkPhos  59     15 Osmani 2019 07:52  TPro  5.3    /  Alb  2.4    /  TBili  1.3    /  DBili  .30    /  AST  22     /  ALT  19     /  AlkPhos  57     14 Jun 2019 06:53    PT/INR - ( 15 Osmani 2019 07:52 )   PT: 12.7 sec;   INR: 1.12 ratio         PTT - ( 15 Osmani 2019 07:52 )  PTT:27.9 sec    from: CT Angio Chest w/ IV Cont (06.08.19 @ 13:02) >    EXAM:  CT ANGIO CHEST (W)AW IC                          EXAM:  CT ANGIO ABD PELV (W)AW IC                          PROCEDURE DATE:  06/08/2019        INTERPRETATION:  CTA CHEST/ABDOMEN/PELVIS:     CLINICAL INFORMATION: Pain with abdominal aortic aneurysm. Rule out   rupture    COMPARISON: CT abdomen and pelvis of 1/13/2019 and older study of   7/21/2018..    PROCEDURE:  Using multislice helical CT, 2.5 mm sections were obtained   from the thoracic inlet through the ischial tuberosities withthe   administration of intravenous contrast. Oral contrast was not   administered. 95 cc of Omnipaque 350 were administered. MIP sequences are   also performed. Preliminary noncontrast imaging was also performed.    Coronal and sagittal reformationswere performed by  CT technologist and   made available for review.    FINDINGS:  The visualized structures of the lower neck are unremarkable.   The visualized aorta is of normal course and caliber. The heart is not   enlarged. There is no evidence of pericardial effusion. There is   atherosclerotic calcification of the thoracic aorta and its branches.   There is tracheobronchial calcification. There is coronary artery   calcification.    There is no evidence of axillary, hilar, or mediastinal lymphadenopathy.    No focal lung consolidations identified. No evidence of pleural effusion   or pneumothorax. No pulmonary nodules identified. There is extensive   centrilobular emphysema. There is scarring at the lung bases with   probable honeycombing at the right lung base      The liver is of normal contour. there is no evidence of mass. The   gallbladder is remarkable for multiple calcified gallstones No evidence   of intra or extrahepatic biliary dilatation.     The pancreas, spleen, and adrenal glands are grossly unremarkable. There   is no evidence of hydronephrosis or perinephric stranding. No evidence of    nephrolithiasis.The bladder is unremarkable. There is nonobstructive   right lower pole renal calculus measuring 7 mm. There isno   hydronephrosis.    No evidence of lymphadenopathy utilizing CT size criteria. The aorta   demonstrates saccular aneurysm of the mid aorta just below the level of   the renal arteries with a configuration similar to that seen on the   previous study. There is atherosclerotic calcification of the abdominal   aorta and its branches. A stent is seen on the right at the level of the   external iliac artery with graft extending to the groin.    There is no evidence of bowel obstruction. There is no evidence of   pneumoperitoneum or free fluid. There is diverticulosis without   diverticulitis. Surgical clips are seen in the left pelvis. Surgical   sutures are again seen in the right lower quadrant and in the small bowel    The visualized osseous structures demonstrate osteopenia and degenerative   changes of the spine. There is bilateral hip ORIF.    IMPRESSION:  No significant change in the appearance of infrarenal   abdominal aortic aneurysm without evidence of leakage.  Small calcified gallstones are again noted  Nonobstructive right lower pole renal calculus is again seen in  Postsurgical changes with surgical clips in the left pelvis and right   lower quadrant small bowel sutures.  Right external iliac stent with graft extending into the right groin.   There is no flow seen within the stent/graft similar to the prior   contrast-enhanced study suggesting occlusion.    SHIMON TOSCANO M.D.,ATTENDING RADIOLOGIST  This document has been electronically signed. Jun 8 2019  1:22PM      < end of copied text >    < from: TTE Echo Doppler w/o Cont (02.05.18 @ 08:11) >     EXAM:  ECHO TTE W/O CON COMP W/DOPPLR         PROCEDURE DATE:  02/05/2018        INTERPRETATION:  INDICATION: respiratory failure  Referring M.D.:Lona  Blood Pressure 126/59        Weight (kg) :49     Height (cm):157       BSA (sq m): 1.47  Technician: JOJO    Dimensions:    LA 3.4       Normal Values: 2.0 - 4.0 cm    Ao 3.0        Normal Values: 2.0 - 3.8 cm  SEPTUM 0.8       Normal Values: 0.6 - 1.2 cm  PWT 0.8       Normal Values: 0.6 - 1.1 cm  LVIDd 3.4         Normal Values: 3.0 - 5.6 cm  LVIDs 2.3         Normal Values: 1.8 - 4.0 cm    OBSERVATIONS:  Technically difficult bedside ICU study. The patient is vented  Mitral Valve: Calcified mitral annulus and mitral valve leaflets with   normal opening. Mild mitral regurgitation.  Aortic Valve/Aorta: Calcified trileaflet aortic valve with normal opening.  Tricuspid Valve: Calcified trileaflet cusp at annulus with normally   opening valve leaflets. Mild tricuspid regurgitation.  Pulmonic Valve: The pulmonic valve is not well visualized. Probably   normal. Mild PI  Left Atrium: Small to moderate left atrial enlargement  Right Atrium: Normal  Left Ventricle: The endocardium is not well-visualized. There appears to   be grossly preserved left ventricular systolic function. There appears to   be concentric left ventricular hypertrophy. The EF is approximately 65%.  Right Ventricle: Normal right ventricular size and function. There is a   device wire noted in the right heart  Pericardium/Pleura: No pericardial effusion noted. Left pleural effusion   noted  Pulmonary/RV Pressure: The right ventricular systolic pressure is   estimated to be 41mmHg, assuming that the right atrial pressure is   estimated to be 8 mmHg. This is consistent with mild pulmonary   hypertension.  LV Diastolic Function: Stage II diastolic dysfunction    Conclusion: Likely difficult and limited study. Grossly preserved left   ventricular systolic function. Sensation diastolic dysfunction. Left   pleural effusion is noted.    KIET NIETO M.D., ATTENDING CARDIOLOGIST  This document has been electronically signed. Feb 6 2018  7:49PM  < from: TTE Echo Doppler w/o Cont (06.10.19 @ 14:06) >  XAM:  ECHO TTE WO CON COMP W DOPPLR         PROCEDURE DATE:  06/10/2019        INTERPRETATION:  INDICATION: NSTEMI    Blood Pressure 157/82    Height 154.9 cm     Weight 52.3 kg       BSA   1.49 sq m    Dimensions:    LA 3.6       Normal Values: 2.0 - 4.0 cm    Ao 2.9        Normal Values: 2.0 - 3.8 cm  SEPTUM 1.0       Normal Values: 0.6 - 1.2 cm  PWT 0.9       Normal Values: 0.6 - 1.1 cm  LVIDd 4.2         Normal Values: 3.0 - 5.6 cm  LVIDs 3.4         Normal Values: 1.8 - 4.0 cm      OBSERVATIONS:  Technically difficult study  Mitral Valve: Thickened leaflets, moderate MR.  Aortic Valve/Aorta: Sclerotic trileaflet aortic valve. Mild AI  Tricuspid Valve: normal with mild TR.  Pulmonic Valve: Mild PI  Left Atrium: Enlarged  Right Atrium:normal  Left Ventricle: Endocardium not well-visualized, estimated LVEF of   45-50%. Cannot rule out segmental dysfunction  Right Ventricle: Not well-visualized  Pericardium/Pleura: normal, no significant pericardial effusion.  Pulmonary/RV Pressure:estimated PA systolic pressure of 43 mmHg assuming   an RA pressure of 10 mmHg.  LV diastolic dysfunction is present  Small right-sided pleural effusion    Conclusion:   Technically difficult study  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 Cannot rule out segmental dysfunction  Left atrial enlargement  Sclerotic trileaflet aortic valve. Mild AI  Thickened mitral valve leaflets with moderate MR  Mild TR  Small right-sided pleural effusion          JACQUELINE GOODEN   This document has been electronically signed. Jun 11 2019  8:16AM             [facility]   Cardiology [facility] -- Dahiana Bañuelos, Toney Horner, Truman Falcon Savella  Office # [phone]      Follow Up:    Anemia, NSTEMI    Subjective/Observations:   No events overnight resting comfortably in bed.  No complaints of chest pain, dyspnea, or palpitations reported. No signs of orthopnea or PND. PT c/o pain with eating and "indigestion" Abd pain has resolved.     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  Osteoarthritis  Afib  PVD (peripheral vascular disease)  Neuropathy: (Right lower leg)  H/O cerebral aneurysm repair: brain clips  CVA (cerebral vascular accident): (&quot;Mini-stroke&quot;,1990&#x27;s)  Rheumatoid arthritis  Hyperlipidemia  Hypertension  S/P cataract surgery: (Left eye)  Elective surgery: (&quot;Twisted bowel&quot;, 2014)  Elective surgery: (Exision of cyst on liver, 1985)  S/P ORIF (open reduction internal fixation) fracture: Left hip fx (2012) &amp; R hip fx (2013)  H/O cerebral aneurysm repair: Brain clips (1978(  Renal stone: Cysto stent placement 10/1/2014  PVD (peripheral vascular disease): s/p RLE bypass x 3, most recent 3/2012 Right external iliac to PT bypass w/ PTFE (2012)  S/P FRED (total abdominal hysterectomy): (1987, Hx of &quot;ovarian cancer?&quot;)      MEDICATIONS  (STANDING):  acetaminophen   Tablet .. 650 milliGRAM(s) Oral every 6 hours  ALBUTerol    0.083% 2.5 milliGRAM(s) Nebulizer every 8 hours  aspirin  chewable 81 milliGRAM(s) Oral daily  atorvastatin 20 milliGRAM(s) Oral at bedtime  chlorhexidine 2% Cloths 1 Application(s) Topical <User Schedule>  diltiazem    milliGRAM(s) Oral daily  docusate sodium 100 milliGRAM(s) Oral three times a day  fluconAZOLE   Tablet 100 milliGRAM(s) Oral daily  glycerin Suppository - Adult 1 Suppository(s) Rectal at bedtime  lactulose Syrup 30 Gram(s) Oral two times a day  lisinopril 2.5 milliGRAM(s) Oral daily  metoprolol succinate  milliGRAM(s) Oral two times a day  pantoprazole    Tablet 40 milliGRAM(s) Oral every 12 hours  polyethylene glycol 3350 17 Gram(s) Oral daily  senna 2 Tablet(s) Oral at bedtime    MEDICATIONS  (PRN):  aluminum hydroxide/magnesium hydroxide/simethicone Suspension 30 milliLiter(s) Oral every 4 hours PRN Dyspepsia  morphine  - Injectable 1 milliGRAM(s) IV Push every 6 hours PRN Severe Pain (7 - 10)  ondansetron Injectable 4 milliGRAM(s) IV Push once PRN Nausea and/or Vomiting  oxyCODONE    IR 5 milliGRAM(s) Oral every 4 hours PRN Moderate Pain (4 - 6)      Allergies    No Known Allergies    Intolerances        Vital Signs Last 24 Hrs  T(C): 36.5 (15 Osmani 2019 07:49), Max: 37.1 (14 Jun 2019 20:05)  T(F): 97.7 (15 Osmani 2019 07:49), Max: 98.7 (14 Jun 2019 20:05)  HR: 77 (15 Osmani 2019 07:49) (70 - 84)  BP: 147/74 (15 Osmani 2019 07:49) (109/72 - 149/83)  BP(mean): --  RR: 17 (15 Osmani 2019 07:49) (17 - 18)  SpO2: 97% (15 Osmani 2019 07:49) (96% - 100%)    I&O's Summary    14 Jun 2019 07:01  -  15 Osmani 2019 07:00  --------------------------------------------------------  IN: 720 mL / OUT: 750 mL / NET: -30 mL          PHYSICAL EXAM:  TELE: NSR  Constitutional: NAD, awake   HEENT: Moist Mucous Membranes, Anicteric  Pulmonary: Decreased breath sounds b/l. No rales, crackles or wheeze appreciated.   Cardiovascular: Regular, S1 and S2 nl, 2/6 SM  Gastrointestinal: Bowel Sounds present, soft, nontender.   Lymph: No lymphadenopathy. No peripheral edema.  Skin: No visible rashes or ulcers.  Psych:  Mood & affect appropriate    LABS: All Labs Reviewed:                        10.8   12.11 )-----------( 172      ( 15 Osmani 2019 07:52 )             33.9                         11.4   14.69 )-----------( 150      ( 14 Jun 2019 06:53 )             35.0                         11.3   10.51 )-----------( 149      ( 13 Jun 2019 06:19 )             34.5     15 Osmani 2019 07:52    142    |  106    |  27     ----------------------------<  112    3.5     |  30     |  1.00   14 Jun 2019 06:53    143    |  107    |  24     ----------------------------<  103    3.6     |  28     |  0.93   13 Jun 2019 06:19    145    |  109    |  25     ----------------------------<  105    3.4     |  29     |  0.96     Ca    8.1        15 Osmani 2019 07:52  Ca    7.9        14 Jun 2019 06:53  Ca    8.3        13 Jun 2019 06:19    TPro  5.4    /  Alb  2.3    /  TBili  1.1    /  DBili  x      /  AST  41     /  ALT  24     /  AlkPhos  59     15 Osmani 2019 07:52  TPro  5.3    /  Alb  2.4    /  TBili  1.3    /  DBili  .30    /  AST  22     /  ALT  19     /  AlkPhos  57     14 Jun 2019 06:53    PT/INR - ( 15 Osmani 2019 07:52 )   PT: 12.7 sec;   INR: 1.12 ratio         PTT - ( 15 Osmani 2019 07:52 )  PTT:27.9 sec    from: CT Angio Chest w/ IV Cont (06.08.19 @ 13:02) >    EXAM:  CT ANGIO CHEST (W)AW IC                          EXAM:  CT ANGIO ABD PELV (W)AW IC                          PROCEDURE DATE:  06/08/2019        INTERPRETATION:  CTA CHEST/ABDOMEN/PELVIS:     CLINICAL INFORMATION: Pain with abdominal aortic aneurysm. Rule out   rupture    COMPARISON: CT abdomen and pelvis of 1/13/2019 and older study of   7/21/2018..    PROCEDURE:  Using multislice helical CT, 2.5 mm sections were obtained   from the thoracic inlet through the ischial tuberosities withthe   administration of intravenous contrast. Oral contrast was not   administered. 95 cc of Omnipaque 350 were administered. MIP sequences are   also performed. Preliminary noncontrast imaging was also performed.    Coronal and sagittal reformationswere performed by  CT technologist and   made available for review.    FINDINGS:  The visualized structures of the lower neck are unremarkable.   The visualized aorta is of normal course and caliber. The heart is not   enlarged. There is no evidence of pericardial effusion. There is   atherosclerotic calcification of the thoracic aorta and its branches.   There is tracheobronchial calcification. There is coronary artery   calcification.    There is no evidence of axillary, hilar, or mediastinal lymphadenopathy.    No focal lung consolidations identified. No evidence of pleural effusion   or pneumothorax. No pulmonary nodules identified. There is extensive   centrilobular emphysema. There is scarring at the lung bases with   probable honeycombing at the right lung base      The liver is of normal contour. there is no evidence of mass. The   gallbladder is remarkable for multiple calcified gallstones No evidence   of intra or extrahepatic biliary dilatation.     The pancreas, spleen, and adrenal glands are grossly unremarkable. There   is no evidence of hydronephrosis or perinephric stranding. No evidence of    nephrolithiasis.The bladder is unremarkable. There is nonobstructive   right lower pole renal calculus measuring 7 mm. There isno   hydronephrosis.    No evidence of lymphadenopathy utilizing CT size criteria. The aorta   demonstrates saccular aneurysm of the mid aorta just below the level of   the renal arteries with a configuration similar to that seen on the   previous study. There is atherosclerotic calcification of the abdominal   aorta and its branches. A stent is seen on the right at the level of the   external iliac artery with graft extending to the groin.    There is no evidence of bowel obstruction. There is no evidence of   pneumoperitoneum or free fluid. There is diverticulosis without   diverticulitis. Surgical clips are seen in the left pelvis. Surgical   sutures are again seen in the right lower quadrant and in the small bowel    The visualized osseous structures demonstrate osteopenia and degenerative   changes of the spine. There is bilateral hip ORIF.    IMPRESSION:  No significant change in the appearance of infrarenal   abdominal aortic aneurysm without evidence of leakage.  Small calcified gallstones are again noted  Nonobstructive right lower pole renal calculus is again seen in  Postsurgical changes with surgical clips in the left pelvis and right   lower quadrant small bowel sutures.  Right external iliac stent with graft extending into the right groin.   There is no flow seen within the stent/graft similar to the prior   contrast-enhanced study suggesting occlusion.    SHIMON TOSCANO M.D.,ATTENDING RADIOLOGIST  This document has been electronically signed. Jun 8 2019  1:22PM      < end of copied text >    < from: TTE Echo Doppler w/o Cont (02.05.18 @ 08:11) >     EXAM:  ECHO TTE W/O CON COMP W/DOPPLR         PROCEDURE DATE:  02/05/2018        INTERPRETATION:  INDICATION: respiratory failure  Referring M.D.:Lona  Blood Pressure 126/59        Weight (kg) :49     Height (cm):157       BSA (sq m): 1.47  Technician: JOJO    Dimensions:    LA 3.4       Normal Values: 2.0 - 4.0 cm    Ao 3.0        Normal Values: 2.0 - 3.8 cm  SEPTUM 0.8       Normal Values: 0.6 - 1.2 cm  PWT 0.8       Normal Values: 0.6 - 1.1 cm  LVIDd 3.4         Normal Values: 3.0 - 5.6 cm  LVIDs 2.3         Normal Values: 1.8 - 4.0 cm    OBSERVATIONS:  Technically difficult bedside ICU study. The patient is vented  Mitral Valve: Calcified mitral annulus and mitral valve leaflets with   normal opening. Mild mitral regurgitation.  Aortic Valve/Aorta: Calcified trileaflet aortic valve with normal opening.  Tricuspid Valve: Calcified trileaflet cusp at annulus with normally   opening valve leaflets. Mild tricuspid regurgitation.  Pulmonic Valve: The pulmonic valve is not well visualized. Probably   normal. Mild PI  Left Atrium: Small to moderate left atrial enlargement  Right Atrium: Normal  Left Ventricle: The endocardium is not well-visualized. There appears to   be grossly preserved left ventricular systolic function. There appears to   be concentric left ventricular hypertrophy. The EF is approximately 65%.  Right Ventricle: Normal right ventricular size and function. There is a   device wire noted in the right heart  Pericardium/Pleura: No pericardial effusion noted. Left pleural effusion   noted  Pulmonary/RV Pressure: The right ventricular systolic pressure is   estimated to be 41mmHg, assuming that the right atrial pressure is   estimated to be 8 mmHg. This is consistent with mild pulmonary   hypertension.  LV Diastolic Function: Stage II diastolic dysfunction    Conclusion: Likely difficult and limited study. Grossly preserved left   ventricular systolic function. Sensation diastolic dysfunction. Left   pleural effusion is noted.    KIET NIETO M.D., ATTENDING CARDIOLOGIST  This document has been electronically signed. Feb 6 2018  7:49PM  < from: TTE Echo Doppler w/o Cont (06.10.19 @ 14:06) >  XAM:  ECHO TTE WO CON COMP W DOPPLR         PROCEDURE DATE:  06/10/2019        INTERPRETATION:  INDICATION: NSTEMI    Blood Pressure 157/82    Height 154.9 cm     Weight 52.3 kg       BSA   1.49 sq m    Dimensions:    LA 3.6       Normal Values: 2.0 - 4.0 cm    Ao 2.9        Normal Values: 2.0 - 3.8 cm  SEPTUM 1.0       Normal Values: 0.6 - 1.2 cm  PWT 0.9       Normal Values: 0.6 - 1.1 cm  LVIDd 4.2         Normal Values: 3.0 - 5.6 cm  LVIDs 3.4         Normal Values: 1.8 - 4.0 cm      OBSERVATIONS:  Technically difficult study  Mitral Valve: Thickened leaflets, moderate MR.  Aortic Valve/Aorta: Sclerotic trileaflet aortic valve. Mild AI  Tricuspid Valve: normal with mild TR.  Pulmonic Valve: Mild PI  Left Atrium: Enlarged  Right Atrium:normal  Left Ventricle: Endocardium not well-visualized, estimated LVEF of   45-50%. Cannot rule out segmental dysfunction  Right Ventricle: Not well-visualized  Pericardium/Pleura: normal, no significant pericardial effusion.  Pulmonary/RV Pressure:estimated PA systolic pressure of 43 mmHg assuming   an RA pressure of 10 mmHg.  LV diastolic dysfunction is present  Small right-sided pleural effusion    Conclusion:   Technically difficult study  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 Cannot rule out segmental dysfunction  Left atrial enlargement  Sclerotic trileaflet aortic valve. Mild AI  Thickened mitral valve leaflets with moderate MR  Mild TR  Small right-sided pleural effusion          JACQUELINE GOODEN   This document has been electronically signed. Jun 11 2019  8:16AM             Campbell Juan [facility]   Cardiology

## 2019-06-15 NOTE — PROGRESS NOTE ADULT - SUBJECTIVE AND OBJECTIVE BOX
INTERVAL HPI/OVERNIGHT EVENTS:  pt seen and examined  c/o mild pain- points from esophagus to epigastric region and mild nausea  had small bm  denies vomiting  no s/s active gib per rn and pt not medicated for pain overnight per rn    MEDICATIONS  (STANDING):  acetaminophen   Tablet .. 650 milliGRAM(s) Oral every 6 hours  ALBUTerol    0.083% 2.5 milliGRAM(s) Nebulizer every 8 hours  aspirin  chewable 81 milliGRAM(s) Oral daily  atorvastatin 20 milliGRAM(s) Oral at bedtime  chlorhexidine 2% Cloths 1 Application(s) Topical <User Schedule>  diltiazem    milliGRAM(s) Oral daily  docusate sodium 100 milliGRAM(s) Oral three times a day  fluconAZOLE   Tablet 100 milliGRAM(s) Oral daily  lisinopril 2.5 milliGRAM(s) Oral daily  metoprolol succinate  milliGRAM(s) Oral two times a day  pantoprazole    Tablet 40 milliGRAM(s) Oral every 12 hours  polyethylene glycol 3350 17 Gram(s) Oral daily  senna 2 Tablet(s) Oral at bedtime    MEDICATIONS  (PRN):  morphine  - Injectable 1 milliGRAM(s) IV Push every 6 hours PRN Severe Pain (7 - 10)  ondansetron Injectable 4 milliGRAM(s) IV Push once PRN Nausea and/or Vomiting  oxyCODONE    IR 5 milliGRAM(s) Oral every 4 hours PRN Moderate Pain (4 - 6)      Allergies    No Known Allergies    Intolerances        Review of Systems:    General:  No wt loss, fevers, chills, night sweats, fatigue   Eyes:  Good vision, no reported pain  ENT:  No sore throat, pain, runny nose, dysphagia  CV:  No pain, palpitations, hypo/hypertension  Resp:  No dyspnea, cough, tachypnea, wheezing  GI:  +pain, +nausea, No vomiting, No diarrhea, No constipation, No weight loss, No fever, No pruritis, No rectal bleeding, No melena, No dysphagia  :  No pain, bleeding, incontinence, nocturia  Muscle:  No pain, weakness  Neuro:  No weakness, tingling, memory problems  Psych:  No fatigue, insomnia, mood problems, depression  Endocrine:  No polyuria, polydypsia, cold/heat intolerance  Heme:  No petechiae, ecchymosis, easy bruisability  Skin:  No rash, tattoos, scars, edema      Vital Signs Last 24 Hrs  T(C): 36.5 (15 Osmani 2019 07:49), Max: 37.1 (14 Jun 2019 20:05)  T(F): 97.7 (15 Osmani 2019 07:49), Max: 98.7 (14 Jun 2019 20:05)  HR: 77 (15 Osmani 2019 07:49) (70 - 84)  BP: 147/74 (15 Osmani 2019 07:49) (109/72 - 149/83)  BP(mean): --  RR: 17 (15 Osmani 2019 07:49) (17 - 18)  SpO2: 97% (15 Osmani 2019 07:49) (96% - 100%)    PHYSICAL EXAM:  Constitutional: lying in bed  HEENT: ncat  Neck: No LAD  Respiratory:  dec bs  Cardiovascular: S1 and S2, RRR  Gastrointestinal: soft mild epigastric ttp no guarding mild dt  Extremities: No edema  Vascular: 2+ peripheral pulses  Neurological: Awake alert responds appropriately  Skin: No rashes    LABS:                        10.8   12.11 )-----------( 172      ( 15 Osmani 2019 07:52 )             33.9     06-15    142  |  106  |  27<H>  ----------------------------<  112<H>  3.5   |  30  |  1.00    Ca    8.1<L>      15 Osmani 2019 07:52    TPro  5.4<L>  /  Alb  2.3<L>  /  TBili  1.1  /  DBili  x   /  AST  41<H>  /  ALT  24  /  AlkPhos  59  06-15    PT/INR - ( 15 Osmani 2019 07:52 )   PT: 12.7 sec;   INR: 1.12 ratio         PTT - ( 15 Osmani 2019 07:52 )  PTT:27.9 sec      RADIOLOGY & ADDITIONAL TESTS:

## 2019-06-15 NOTE — PROGRESS NOTE ADULT - PROBLEM SELECTOR PLAN 2
Likely secondary to acute demand ischemia in setting of severe anemia  will resume ASA and no contraindication as per GI.  TTE: LVEF 45-50% with mod MR.  c/w Statin, BB   Cardio following

## 2019-06-15 NOTE — PROGRESS NOTE ADULT - PROBLEM SELECTOR PLAN 1
resolved  cta a/p neg for acute gi pathology  sp egd- findings of candida esophagitis, gastritis and bile reflux; no stigmata of recent bleeding  cont diflucan x 5 days  cont ppi bid, anti emetics prn, add maalox prn for indigestion  monitor cbc, transfuse prn  diet as tolerated  monitor exam, prn pain control  will need op capsule  axr noted- increase bowel regimen, monitor stool output resolved  cta a/p neg for acute gi pathology  sp egd- findings of candida esophagitis, gastritis and bile reflux; no stigmata of recent bleeding  cont diflucan x 5 days  cont ppi bid, anti emetics prn, add maalox prn for indigestion  monitor cbc, transfuse prn  diet as tolerated  monitor exam, prn pain control  will need op capsule  axr noted- increase bowel regimen, monitor stool output  no gi objection to asa/plavix; dw attg/agreeable

## 2019-06-15 NOTE — PROGRESS NOTE ADULT - PROBLEM SELECTOR PLAN 1
EGD showed no evidence of acute GIB although shows esophageal candidiasis. Fluconazole started.   GI recommends outpt occult bleeding capsule work up    H/H is stable.   C/o abdominal pain likely 2/2 constipation. Abdominal XR ordered,   c/w suppository, miralax and lactulose   GI Dr Lassiter following  Tranfusion to hbg >9 as per cardio recs (given NSTEMI)  - s/p 4 units throughout hospital stay   - Pain control- morphine added to pain regimen

## 2019-06-15 NOTE — PROGRESS NOTE ADULT - PROBLEM SELECTOR PLAN 1
seen and examined  vs and meds reviewed  planned for poss DC to [facility] today  on Diflucan for Candida Esophagitis - EGD done - see GI notes  GI Follow up noted, remains on PPI BID for GIB  no objection from Pulmonary  PPI  off AC  on NEBS for COPD Emphysema  O2 support as needed  keep sat > 88 pct  assist with ADL  oral hygiene  skin care  monitor vs and HD and sat  serial hgb and labs  will follow  medical notes and follow ups noted.

## 2019-06-15 NOTE — PROGRESS NOTE ADULT - ASSESSMENT
84f PAF on coumadin, HFPEF (most recent ef normal on a limited study, performed 4/2018),  CVA, clipped cerebral aneurysm HTN, HLD, mitral valve prolapse, PAD s/p bypass/stents, redo bypass, and a degree of residual non revascularizable disease, with associated sxs.  She does not clearly have a hx of cad.  She had nuclear stress testing in 2014.  She was found to have an inferolateral defect ultimately deemed artifact, though the description could conceivably have represented ischemia. presents w/ GI bleed w/ severe anemia w/ elevated lactate s/p PRBC x3 w/ vol overload    NSTEMI  - There was evidence of ischemia on EKG  - Cardiac enzymes have been elevated with troponins peaking at 25.6 and steadily trended down to 5.1  - Ischemia was on the basis of demand from severe anemia   -  it remains inappropriate to add antiplatelet therapy at this time as she was admitted w/ severe anemia resulting in thee NSTEMI and the source has not yet been identified    - Need to trend H/H and transfuse to keep hgb >9  - Continue Toprol 100 BID to help control her anginal symptoms.  - Increase statin as her LFT's have normalized  - TTE yesterday showed depressed EF 40-45% compared to her last TTE ion 2/2018 showing preserved LVF.  SWMA cannot be ruled out  Monitor and replete electrolytes. Keep K>4.0 and Mg>2.0.     HTN  - Improved  - C/w ACE at low dose  - pain management    GIB  - Presented with Hgb of 4.9, s/p PRBC x 3 units.    - Continue to trend H/H.   - sp egd- findings of candida esophagitis, gastritis and bile reflux; no stigmata of recent bleeding  cont diflucan x 5 days  - Will need outpt capsule   - GI following    Afib  - She presented with therapeutic inr, was reversed with kcentra and vit k in the setting of bleeding  - She is currently in NSR  - Continue CCB and BB  - Hold AC for now    HFrEF  - No evidence of volume overload  - TTE in Feb. 2018 showed preserved LVF with no significant valvular diseases.   - TTE now showing EF 40-45% but unable to r/o SWMA.  This change in LVF can be a result of her NSTEMI      Campbell Juan [facility]  Cardiology 84f PAF on coumadin, HFPEF (most recent ef normal on a limited study, performed 4/2018),  CVA, clipped cerebral aneurysm HTN, HLD, mitral valve prolapse, PAD s/p bypass/stents, redo bypass, and a degree of residual non revascularizable disease, with associated sxs.  She does not clearly have a hx of cad.  She had nuclear stress testing in 2014.  She was found to have an inferolateral defect ultimately deemed artifact, though the description could conceivably have represented ischemia. presents w/ GI bleed w/ severe anemia w/ elevated lactate s/p PRBC x3 w/ vol overload    NSTEMI  - There was evidence of ischemia on EKG  - Cardiac enzymes have been elevated with troponins peaking at 25.6 and steadily trended down to 5.1  - Ischemia was on the basis of demand from severe anemia   -  it remains inappropriate to add antiplatelet therapy at this time as she was admitted w/ severe anemia resulting in thee NSTEMI and the source has not yet been identified    - Need to trend H/H and transfuse to keep hgb >9  - Continue Toprol 100 BID to help control her anginal symptoms.  - Increase statin as her LFT's have normalized  - TTE yesterday showed depressed EF 40-45% compared to her last TTE ion 2/2018 showing preserved LVF.  SWMA cannot be ruled out  Monitor and replete electrolytes. Keep K>4.0 and Mg>2.0.     HTN  - Improved  - C/w ACE at low dose  - pain management    GIB  - Presented with Hgb of 4.9, s/p PRBC x 3 units.    - Continue to trend H/H.   - sp egd- findings of candida esophagitis, gastritis and bile reflux; no stigmata of recent bleeding  cont diflucan x 5 days  - Will need outpt capsule   - GI following    Afib  - She presented with therapeutic inr, was reversed with kcentra and vit k in the setting of bleeding  - She is currently in NSR  - Continue CCB and BB  - Hold AC for now. Will need clearance from GI when to restart.     HFrEF  - No evidence of volume overload  - TTE in Feb. 2018 showed preserved LVF with no significant valvular diseases.   - TTE now showing EF 40-45% but unable to r/o SWMA.  This change in LVF can be a result of her NSTEMI    Monitor and replete electrolytes. Keep K>4.0 and Mg>2.0.   Further cardiac workup will depend on clinical course.   All other workup per primary team. Will followup.     Campbell Juan [facility]  Cardiology

## 2019-06-15 NOTE — PROGRESS NOTE ADULT - SUBJECTIVE AND OBJECTIVE BOX
Patient is a 84y old  Female who presents with a chief complaint of Melena, weakness, GIB (11 Jun 2019 18:42)      INTERVAL HPI/OVERNIGHT EVENTS: Patient seen and examined at bedside. Patient complaining of nausea and no abs pain. Abdominal XR, c/w dense stools no obstruction. Patient also admits of not having a BM since 6 days ago.     MEDICATIONS  (STANDING):  acetaminophen   Tablet .. 650 milliGRAM(s) Oral every 6 hours  ALBUTerol    0.083% 2.5 milliGRAM(s) Nebulizer every 8 hours  aspirin  chewable 81 milliGRAM(s) Oral daily  atorvastatin 20 milliGRAM(s) Oral at bedtime  chlorhexidine 2% Cloths 1 Application(s) Topical <User Schedule>  diltiazem    milliGRAM(s) Oral daily  docusate sodium 100 milliGRAM(s) Oral three times a day  fluconAZOLE   Tablet 100 milliGRAM(s) Oral daily  glycerin Suppository - Adult 1 Suppository(s) Rectal at bedtime  lactulose Syrup 30 Gram(s) Oral two times a day  lisinopril 2.5 milliGRAM(s) Oral daily  metoprolol succinate  milliGRAM(s) Oral two times a day  pantoprazole    Tablet 40 milliGRAM(s) Oral every 12 hours  polyethylene glycol 3350 17 Gram(s) Oral daily  senna 2 Tablet(s) Oral at bedtime    MEDICATIONS  (PRN):  aluminum hydroxide/magnesium hydroxide/simethicone Suspension 30 milliLiter(s) Oral every 4 hours PRN Dyspepsia  morphine  - Injectable 1 milliGRAM(s) IV Push every 6 hours PRN Severe Pain (7 - 10)  ondansetron Injectable 4 milliGRAM(s) IV Push once PRN Nausea and/or Vomiting  oxyCODONE    IR 5 milliGRAM(s) Oral every 4 hours PRN Moderate Pain (4 - 6)      Allergies    No Known Allergies    Intolerances        REVIEW OF SYSTEMS:  CONSTITUTIONAL: No fever   HEENT: No headache, no sore throat  RESPIRATORY: No cough, wheezing, or shortness of breath  CARDIOVASCULAR: No chest pain, palpitations, or leg swelling  GASTROINTESTINAL: + abd pain, nausea, denies vomiting, or diarrhea  GENITOURINARY: No dysuria, frequency, or hematuria  NEUROLOGICAL: No focal weakness or dizziness  MUSCULOSKELETAL: No myalgias     Vital Signs Last 24 Hrs  T(C): 36.5 (15 Osmani 2019 12:52), Max: 37.1 (14 Jun 2019 20:05)  T(F): 97.7 (15 Osmani 2019 12:52), Max: 98.7 (14 Jun 2019 20:05)  HR: 80 (15 Osmani 2019 14:23) (70 - 84)  BP: 128/78 (15 Osmani 2019 12:52) (121/87 - 149/83)  BP(mean): --  RR: 18 (15 Osmani 2019 12:52) (17 - 18)  SpO2: 98% (15 Osmani 2019 14:23) (97% - 100%)  PHYSICAL EXAM:  GENERAL: NAD  HEENT: EOMI, moist mucous membranes  CHEST/LUNG: + crackles, no rales, wheezes, or rhonchi  HEART: Systolic murmur, S1, S2  ABDOMEN: BS+, soft, + mildly tender, nondistended  EXTREMITIES: No edema, cyanosis, or calf tenderness  NERVOUS SYSTEM: AA&Ox3    LABS:                        10.8   12.11 )-----------( 172      ( 15 Osmani 2019 07:52 )             33.9     15 Osmani 2019 07:52    142    |  106    |  27     ----------------------------<  112    3.5     |  30     |  1.00     Ca    8.1        15 Osmani 2019 07:52    TPro  5.4    /  Alb  2.3    /  TBili  1.1    /  DBili  x      /  AST  41     /  ALT  24     /  AlkPhos  59     15 Osmani 2019 07:52    PT/INR - ( 15 Osmani 2019 07:52 )   PT: 12.7 sec;   INR: 1.12 ratio         PTT - ( 15 Osmani 2019 07:52 )  PTT:27.9 sec    CAPILLARY BLOOD GLUCOSE        UCx       RADIOLOGY & ADDITIONAL TESTS:              Culture - Blood (collected 06-09-19 @ 18:15)  Source: .Blood Blood-Venous  Preliminary Report (06-10-19 @ 19:00):    No growth to date.    Culture - Blood (collected 06-09-19 @ 18:15)  Source: .Blood Blood-Venous  Preliminary Report (06-10-19 @ 19:00):    No growth to date.    Culture - Blood (collected 06-08-19 @ 16:36)  Source: .Blood Blood-Peripheral  Gram Stain (06-09-19 @ 10:02):    Growth in aerobic bottle: Gram Positive Cocci in Clusters  Final Report (06-10-19 @ 11:50):    Growth in aerobic bottle: Coag Negative Staphylococcus    Single set isolate, possible contaminant. Contact    Microbiology if susceptibility testing clinically    indicated.    "Due to technical problems, Proteus sp. will Not be reported as part of    the BCID panel until further notice"    ***Blood Panel PCR results on this specimen are available    approximately 3 hours after the Gram stain result.***    Gram stain, PCR, and/or culture results may not always    correspond due to difference in methodologies.    ************************************************************    This PCR assay was performed using [employer].    The following targets are tested for: Enterococcus,    vancomycin resistant enterococci, Listeria monocytogenes,    coagulase negative staphylococci, S. aureus,    methicillin resistant S. aureus, Streptococcus agalactiae    (Group B), S. pneumoniae, S. pyogenes (Group A),    Acinetobacter baumannii, Enterobacter cloacae, E. coli,    Klebsiella oxytoca, K. pneumoniae, Proteus sp.,    Serratia marcescens, Haemophilus influenzae,    Neisseria meningitidis, Pseudomonas aeruginosa, Candida    albicans, C. glabrata, C krusei, C parapsilosis,    C. tropicalis and the KPC resistance gene.  Organism: Blood Culture PCR (06-10-19 @ 11:50)  Organism: Blood Culture PCR (06-10-19 @ 11:50)      -  Coagulase negative Staphylococcus: Detec      Method Type: PCR    Culture - Blood (collected 06-08-19 @ 16:36)  Source: .Blood Blood-Peripheral  Final Report (06-13-19 @ 17:00):    No growth at 5 days.        RADIOLOGY & ADDITIONAL TESTS:    Personally reviewed.     Consultant(s) Notes Reviewed:  [x] YES  [ ] NO

## 2019-06-16 LAB
ANION GAP SERPL CALC-SCNC: 6 MMOL/L — SIGNIFICANT CHANGE UP (ref 5–17)
BUN SERPL-MCNC: 24 MG/DL — HIGH (ref 7–23)
CALCIUM SERPL-MCNC: 8.2 MG/DL — LOW (ref 8.5–10.1)
CHLORIDE SERPL-SCNC: 106 MMOL/L — SIGNIFICANT CHANGE UP (ref 96–108)
CO2 SERPL-SCNC: 30 MMOL/L — SIGNIFICANT CHANGE UP (ref 22–31)
CREAT SERPL-MCNC: 1 MG/DL — SIGNIFICANT CHANGE UP (ref 0.5–1.3)
GLUCOSE SERPL-MCNC: 94 MG/DL — SIGNIFICANT CHANGE UP (ref 70–99)
HCT VFR BLD CALC: 33.3 % — LOW (ref 34.5–45)
HGB BLD-MCNC: 10.6 G/DL — LOW (ref 11.5–15.5)
INR BLD: 1.41 RATIO — HIGH (ref 0.88–1.16)
MCHC RBC-ENTMCNC: 29.9 PG — SIGNIFICANT CHANGE UP (ref 27–34)
MCHC RBC-ENTMCNC: 31.8 GM/DL — LOW (ref 32–36)
MCV RBC AUTO: 93.8 FL — SIGNIFICANT CHANGE UP (ref 80–100)
NRBC # BLD: 0 /100 WBCS — SIGNIFICANT CHANGE UP (ref 0–0)
PLATELET # BLD AUTO: 188 K/UL — SIGNIFICANT CHANGE UP (ref 150–400)
POTASSIUM SERPL-MCNC: 3.6 MMOL/L — SIGNIFICANT CHANGE UP (ref 3.5–5.3)
POTASSIUM SERPL-SCNC: 3.6 MMOL/L — SIGNIFICANT CHANGE UP (ref 3.5–5.3)
PROTHROM AB SERPL-ACNC: 16.2 SEC — HIGH (ref 10–12.9)
RBC # BLD: 3.55 M/UL — LOW (ref 3.8–5.2)
RBC # FLD: 17.2 % — HIGH (ref 10.3–14.5)
SODIUM SERPL-SCNC: 142 MMOL/L — SIGNIFICANT CHANGE UP (ref 135–145)
WBC # BLD: 10.74 K/UL — HIGH (ref 3.8–10.5)
WBC # FLD AUTO: 10.74 K/UL — HIGH (ref 3.8–10.5)

## 2019-06-16 PROCEDURE — 99233 SBSQ HOSP IP/OBS HIGH 50: CPT

## 2019-06-16 PROCEDURE — 99232 SBSQ HOSP IP/OBS MODERATE 35: CPT

## 2019-06-16 RX ORDER — WARFARIN SODIUM 2.5 MG/1
5 TABLET ORAL ONCE
Refills: 0 | Status: COMPLETED | OUTPATIENT
Start: 2019-06-16 | End: 2019-06-16

## 2019-06-16 RX ORDER — LANOLIN ALCOHOL/MO/W.PET/CERES
5 CREAM (GRAM) TOPICAL AT BEDTIME
Refills: 0 | Status: DISCONTINUED | OUTPATIENT
Start: 2019-06-16 | End: 2019-06-17

## 2019-06-16 RX ADMIN — Medication 650 MILLIGRAM(S): at 07:45

## 2019-06-16 RX ADMIN — PANTOPRAZOLE SODIUM 40 MILLIGRAM(S): 20 TABLET, DELAYED RELEASE ORAL at 05:36

## 2019-06-16 RX ADMIN — Medication 100 MILLIGRAM(S): at 05:36

## 2019-06-16 RX ADMIN — WARFARIN SODIUM 5 MILLIGRAM(S): 2.5 TABLET ORAL at 21:41

## 2019-06-16 RX ADMIN — Medication 650 MILLIGRAM(S): at 17:30

## 2019-06-16 RX ADMIN — POLYETHYLENE GLYCOL 3350 17 GRAM(S): 17 POWDER, FOR SOLUTION ORAL at 11:42

## 2019-06-16 RX ADMIN — Medication 650 MILLIGRAM(S): at 18:30

## 2019-06-16 RX ADMIN — Medication 1 SUPPOSITORY(S): at 21:41

## 2019-06-16 RX ADMIN — LACTULOSE 30 GRAM(S): 10 SOLUTION ORAL at 05:36

## 2019-06-16 RX ADMIN — Medication 120 MILLIGRAM(S): at 05:36

## 2019-06-16 RX ADMIN — PANTOPRAZOLE SODIUM 40 MILLIGRAM(S): 20 TABLET, DELAYED RELEASE ORAL at 17:31

## 2019-06-16 RX ADMIN — Medication 100 MILLIGRAM(S): at 14:57

## 2019-06-16 RX ADMIN — Medication 81 MILLIGRAM(S): at 11:42

## 2019-06-16 RX ADMIN — Medication 650 MILLIGRAM(S): at 12:30

## 2019-06-16 RX ADMIN — LACTULOSE 30 GRAM(S): 10 SOLUTION ORAL at 17:31

## 2019-06-16 RX ADMIN — Medication 100 MILLIGRAM(S): at 17:31

## 2019-06-16 RX ADMIN — ALBUTEROL 2.5 MILLIGRAM(S): 90 AEROSOL, METERED ORAL at 07:57

## 2019-06-16 RX ADMIN — FLUCONAZOLE 100 MILLIGRAM(S): 150 TABLET ORAL at 11:42

## 2019-06-16 RX ADMIN — CHLORHEXIDINE GLUCONATE 1 APPLICATION(S): 213 SOLUTION TOPICAL at 05:36

## 2019-06-16 RX ADMIN — ATORVASTATIN CALCIUM 20 MILLIGRAM(S): 80 TABLET, FILM COATED ORAL at 21:41

## 2019-06-16 RX ADMIN — LISINOPRIL 2.5 MILLIGRAM(S): 2.5 TABLET ORAL at 05:36

## 2019-06-16 RX ADMIN — SENNA PLUS 2 TABLET(S): 8.6 TABLET ORAL at 21:41

## 2019-06-16 RX ADMIN — ALBUTEROL 2.5 MILLIGRAM(S): 90 AEROSOL, METERED ORAL at 19:09

## 2019-06-16 RX ADMIN — Medication 650 MILLIGRAM(S): at 11:41

## 2019-06-16 RX ADMIN — ALBUTEROL 2.5 MILLIGRAM(S): 90 AEROSOL, METERED ORAL at 15:57

## 2019-06-16 RX ADMIN — Medication 650 MILLIGRAM(S): at 05:36

## 2019-06-16 RX ADMIN — Medication 100 MILLIGRAM(S): at 21:41

## 2019-06-16 NOTE — PROGRESS NOTE ADULT - PROBLEM SELECTOR PLAN 1
EGD showed no evidence of acute GIB although shows esophageal candidiasis. Fluconazole started.   GI recommends outpt occult bleeding capsule work up    H/H is stable.   C/o abdominal pain resolved after laxatives.  c/w suppository, miralax and lactulose   GI Dr Lassiter following  Tranfusion to hbg >9 as per cardio recs (given NSTEMI)  - s/p 4 units throughout hospital stay   - Pain control- morphine added to pain regimen

## 2019-06-16 NOTE — PROGRESS NOTE ADULT - NSHPATTENDINGPLANDISCUSS_GEN_ALL_CORE
Pt, cardio, RN and medical team.
Pt, RN and medical team
pt, RN, SW, CM, GI team - re: tx plan, disposition planning
Pt, cardio, RN and medical team.

## 2019-06-16 NOTE — PROGRESS NOTE ADULT - SUBJECTIVE AND OBJECTIVE BOX
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  Osteoarthritis  Afib  PVD (peripheral vascular disease)  Neuropathy: (Right lower leg)  Gout  H/O cerebral aneurysm repair: brain clips  CVA (cerebral vascular accident): (&quot;Mini-stroke&quot;,1990&#x27;s)  Rheumatoid arthritis  Asthma  Hyperlipidemia  Hypertension  S/P cataract surgery: (Left eye)  Elective surgery: (&quot;Twisted bowel&quot;, 2014)  Elective surgery: (Exision of cyst on liver, 1985)  S/P ORIF (open reduction internal fixation) fracture: Left hip fx (2012) &amp; R hip fx (2013)  H/O cerebral aneurysm repair: Brain clips (1978(  Renal stone: Cysto stent placement 10/1/2014  PVD (peripheral vascular disease): s/p RLE bypass x 3, most recent 3/2012 Right external iliac to PT bypass w/ PTFE (2012)  S/P FRED (total abdominal hysterectomy): (1987, Hx of &quot;ovarian cancer?&quot;)        Medication list         MEDICATIONS  (STANDING):  acetaminophen   Tablet .. 650 milliGRAM(s) Oral every 6 hours  ALBUTerol    0.083% 2.5 milliGRAM(s) Nebulizer every 8 hours  aspirin  chewable 81 milliGRAM(s) Oral daily  atorvastatin 20 milliGRAM(s) Oral at bedtime  chlorhexidine 2% Cloths 1 Application(s) Topical <User Schedule>  diltiazem    milliGRAM(s) Oral daily  docusate sodium 100 milliGRAM(s) Oral three times a day  fluconAZOLE   Tablet 100 milliGRAM(s) Oral daily  glycerin Suppository - Adult 1 Suppository(s) Rectal at bedtime  lactulose Syrup 30 Gram(s) Oral two times a day  lisinopril 2.5 milliGRAM(s) Oral daily  metoprolol succinate  milliGRAM(s) Oral two times a day  pantoprazole    Tablet 40 milliGRAM(s) Oral every 12 hours  polyethylene glycol 3350 17 Gram(s) Oral daily  senna 2 Tablet(s) Oral at bedtime    MEDICATIONS  (PRN):  aluminum hydroxide/magnesium hydroxide/simethicone Suspension 30 milliLiter(s) Oral every 4 hours PRN Dyspepsia  morphine  - Injectable 1 milliGRAM(s) IV Push every 6 hours PRN Severe Pain (7 - 10)  ondansetron Injectable 4 milliGRAM(s) IV Push once PRN Nausea and/or Vomiting  oxyCODONE    IR 5 milliGRAM(s) Oral every 4 hours PRN Moderate Pain (4 - 6)         Vitals log        ICU Vital Signs Last 24 Hrs  T(C): 37 (16 Jun 2019 05:28), Max: 37 (15 Osmani 2019 23:20)  T(F): 98.6 (16 Jun 2019 05:28), Max: 98.6 (15 Osmani 2019 23:20)  HR: 79 (16 Jun 2019 05:28) (70 - 83)  BP: 166/79 (16 Jun 2019 05:28) (128/78 - 166/79)  BP(mean): --  ABP: --  ABP(mean): --  RR: 18 (16 Jun 2019 05:28) (17 - 20)  SpO2: 95% (16 Jun 2019 05:28) (94% - 100%)           Input and Output:  I&O's Detail    15 Osmani 2019 07:01  -  16 Jun 2019 07:00  --------------------------------------------------------  IN:  Total IN: 0 mL    OUT:    Voided: 900 mL  Total OUT: 900 mL    Total NET: -900 mL          Lab Data                        10.8   12.11 )-----------( 172      ( 15 Osmani 2019 07:52 )             33.9     06-15    142  |  106  |  27<H>  ----------------------------<  112<H>  3.5   |  30  |  1.00    Ca    8.1<L>      15 Osmani 2019 07:52    TPro  5.4<L>  /  Alb  2.3<L>  /  TBili  1.1  /  DBili  x   /  AST  41<H>  /  ALT  24  /  AlkPhos  59  06-15            Review of Systems	      Objective     Physical Examination    heart s1s2  lung dec BS  abd soft  on o2 support      Pertinent Lab findings & Imaging      Fransico:  NO   Adequate UO     I&O's Detail    15 Osmani 2019 07:01  -  16 Jun 2019 07:00  --------------------------------------------------------  IN:  Total IN: 0 mL    OUT:    Voided: 900 mL  Total OUT: 900 mL    Total NET: -900 mL               Discussed with:     Cultures:	        Radiology

## 2019-06-16 NOTE — PROGRESS NOTE ADULT - PROBLEM SELECTOR PLAN 1
seen and examined, vs and meds reviewed, on o2 support, CM notes reviewed - [facility] DC plan postponed -   on Diflucan for Candida Esophagitis - EGD done - see GI notes  GI Follow up noted, remains on PPI BID for GIB  no objection from Pulmonary  PPI  off AC  on NEBS for COPD Emphysema  O2 support as needed  keep sat > 88 pct  assist with ADL  oral hygiene  skin care  monitor vs and HD and sat  serial hgb and labs  will follow  medical notes and follow ups noted.

## 2019-06-16 NOTE — PROGRESS NOTE ADULT - PROBLEM SELECTOR PLAN 9
DVT ppx: SCD
monitor for now
DVT ppx: SCD
hold aspirin
DVT ppx: SCD

## 2019-06-16 NOTE — PROGRESS NOTE ADULT - SUBJECTIVE AND OBJECTIVE BOX
INTERVAL HPI/OVERNIGHT EVENTS:  pt seen and examined   c/o nausea and intermittent abd pain  per rn pt had large bm, no s/s overt gib, medicated w tylenol this am  took in some po yesterday    MEDICATIONS  (STANDING):  acetaminophen   Tablet .. 650 milliGRAM(s) Oral every 6 hours  ALBUTerol    0.083% 2.5 milliGRAM(s) Nebulizer every 8 hours  aspirin  chewable 81 milliGRAM(s) Oral daily  atorvastatin 20 milliGRAM(s) Oral at bedtime  chlorhexidine 2% Cloths 1 Application(s) Topical <User Schedule>  diltiazem    milliGRAM(s) Oral daily  docusate sodium 100 milliGRAM(s) Oral three times a day  fluconAZOLE   Tablet 100 milliGRAM(s) Oral daily  glycerin Suppository - Adult 1 Suppository(s) Rectal at bedtime  lactulose Syrup 30 Gram(s) Oral two times a day  lisinopril 2.5 milliGRAM(s) Oral daily  metoprolol succinate  milliGRAM(s) Oral two times a day  pantoprazole    Tablet 40 milliGRAM(s) Oral every 12 hours  polyethylene glycol 3350 17 Gram(s) Oral daily  senna 2 Tablet(s) Oral at bedtime    MEDICATIONS  (PRN):  aluminum hydroxide/magnesium hydroxide/simethicone Suspension 30 milliLiter(s) Oral every 4 hours PRN Dyspepsia  morphine  - Injectable 1 milliGRAM(s) IV Push every 6 hours PRN Severe Pain (7 - 10)  ondansetron Injectable 4 milliGRAM(s) IV Push once PRN Nausea and/or Vomiting  oxyCODONE    IR 5 milliGRAM(s) Oral every 4 hours PRN Moderate Pain (4 - 6)      Allergies    No Known Allergies    Intolerances        Review of Systems:    General:  No wt loss, fevers, chills, night sweats, fatigue   Eyes:  Good vision, no reported pain  ENT:  No sore throat, pain, runny nose, dysphagia  CV:  No pain, palpitations, hypo/hypertension  Resp:  No dyspnea, cough, tachypnea, wheezing  GI:  +pain, +nausea, No vomiting, No diarrhea, No constipation, No weight loss, No fever, No pruritis, No rectal bleeding, No melena, No dysphagia  :  No pain, bleeding, incontinence, nocturia  Muscle:  No pain, weakness  Neuro:  No weakness, tingling, memory problems  Psych:  No fatigue, insomnia, mood problems, depression  Endocrine:  No polyuria, polydypsia, cold/heat intolerance  Heme:  No petechiae, ecchymosis, easy bruisability  Skin:  No rash, tattoos, scars, edema      Vital Signs Last 24 Hrs  T(C): 36.7 (16 Jun 2019 08:07), Max: 37 (15 Osmani 2019 23:20)  T(F): 98 (16 Jun 2019 08:07), Max: 98.6 (15 Osmani 2019 23:20)  HR: 86 (16 Jun 2019 08:07) (71 - 86)  BP: 145/83 (16 Jun 2019 08:07) (128/78 - 166/79)  BP(mean): --  RR: 18 (16 Jun 2019 08:07) (18 - 20)  SpO2: 100% (16 Jun 2019 08:07) (94% - 100%)    PHYSICAL EXAM:    Constitutional: lying in bed  HEENT: ncat  Neck: No LAD  Respiratory:  dec bs  Cardiovascular: S1 and S2, RRR  Gastrointestinal: soft mild epigastric ttp no guarding mild dt  Extremities: No edema  Vascular: 2+ peripheral pulses  Neurological: Awake alert responds appropriately  Skin: No rashes      LABS:                        10.6   10.74 )-----------( 188      ( 16 Jun 2019 06:55 )             33.3     06-16    142  |  106  |  24<H>  ----------------------------<  94  3.6   |  30  |  1.00    Ca    8.2<L>      16 Jun 2019 06:55    TPro  5.4<L>  /  Alb  2.3<L>  /  TBili  1.1  /  DBili  x   /  AST  41<H>  /  ALT  24  /  AlkPhos  59  06-15    PT/INR - ( 16 Jun 2019 06:55 )   PT: 16.2 sec;   INR: 1.41 ratio         PTT - ( 15 Osmani 2019 07:52 )  PTT:27.9 sec      RADIOLOGY & ADDITIONAL TESTS:

## 2019-06-16 NOTE — PROGRESS NOTE ADULT - PROBLEM SELECTOR PROBLEM 9
Need for prophylactic measure
Rheumatoid arthritis
Need for prophylactic measure
PVD (peripheral vascular disease)
Need for prophylactic measure

## 2019-06-16 NOTE — PROGRESS NOTE ADULT - ASSESSMENT
84f PAF on coumadin, HFPEF (most recent ef normal on a limited study, performed 4/2018),  CVA, clipped cerebral aneurysm HTN, HLD, mitral valve prolapse, PAD s/p bypass/stents, redo bypass, and a degree of residual non revascularizable disease, with associated sxs.  She does not clearly have a hx of cad.  She had nuclear stress testing in 2014.  She was found to have an inferolateral defect ultimately deemed artifact, though the description could conceivably have represented ischemia. presents w/ GI bleed w/ severe anemia w/ elevated lactate s/p PRBC x3 w/ vol overload    NSTEMI  - There was evidence of ischemia on EKG  - Cardiac enzymes have been elevated with troponins peaking at 25.6 and steadily trended down to 5.1  - Ischemia was on the basis of demand from severe anemia   -  it remains inappropriate to add antiplatelet therapy at this time as she was admitted w/ severe anemia resulting in the NSTEMI and the source has not yet been identified    - Need to trend H/H and transfuse to keep hgb >9  - Continue Toprol 100 BID to help control her anginal symptoms.  - Increase statin as her LFT's have normalized  - TTE yesterday showed depressed EF 40-45% compared to her last TTE ion 2/2018 showing preserved LVF.  SWMA cannot be ruled out  Monitor and replete electrolytes. Keep K>4.0 and Mg>2.0.   - ASA has been restarted.  Monitor H/H closely.  Currently stable    HTN  - Has been trending up overnight  - C/w ACE at low dose CR stable would increase lisinopril to 5mg daily monitor BP closely.   - pain management    GIB  - Presented with Hgb of 4.9, s/p PRBC x 3 units.    - Continue to trend H/H.   - sp egd- findings of candida esophagitis, gastritis and bile reflux; no stigmata of recent bleeding  cont diflucan x 5 days  - Will need outpt capsule   - GI following    Afib  - She presented with therapeutic inr, was reversed with kcentra and vit k in the setting of bleeding  - She is currently in NSR  - Continue CCB and BB  - Hold AC for now.  Will need clearance from GI when to restart. Was restarted on ASA on 6/14.    HFrEF  - No evidence of volume overload  - TTE in Feb. 2018 showed preserved LVF with no significant valvular diseases.   - TTE now showing EF 40-45% but unable to r/o SWMA.  This change in LVF can be a result of her NSTEMI    Monitor and replete electrolytes. Keep K>4.0 and Mg>2.0.   Further cardiac workup will depend on clinical course.   All other workup per primary team. Will followup.   D/C planning to [facility] in progress.     Magalie Suresh, NP-C  Cardiology 84f PAF on coumadin, HFPEF (most recent ef normal on a limited study, performed 4/2018),  CVA, clipped cerebral aneurysm HTN, HLD, mitral valve prolapse, PAD s/p bypass/stents, redo bypass, and a degree of residual non revascularizable disease, with associated sxs.  She does not clearly have a hx of cad.  She had nuclear stress testing in 2014.  She was found to have an inferolateral defect ultimately deemed artifact, though the description could conceivably have represented ischemia. presents w/ GI bleed w/ severe anemia w/ elevated lactate s/p PRBC x3 w/ vol overload    NSTEMI  - There was evidence of ischemia on EKG  - Cardiac enzymes have been elevated with troponins peaking at 25.6 and steadily trended down to 5.1  - Ischemia was on the basis of demand from severe anemia  - Need to trend H/H and transfuse to keep hgb >9 so far stable  - ASA has been restarted.  Monitor H/H closely.  Currently stable  - Continue Toprol 100 BID to help control her anginal symptoms.  - Increase statin as her LFT's have normalized  - TTE yesterday showed depressed EF 40-45% compared to her last TTE ion 2/2018 showing preserved LVF.  SWMA cannot be ruled out  - Monitor and replete electrolytes. Keep K>4.0 and Mg>2.0.     HTN  - Has been trending up overnight  - C/w ACE at low dose CR stable would increase lisinopril to 5mg daily monitor BP closely.   - pain management    GIB  - Presented with Hgb of 4.9, s/p PRBC x 3 units.    - Continue to trend H/H.   - sp egd- findings of candida esophagitis, gastritis and bile reflux; no stigmata of recent bleeding  cont diflucan x 5 days  - Will need outpt capsule   - GI following    Afib  - She presented with therapeutic inr, was reversed with kcentra and vit k in the setting of bleeding  - She is currently in NSR  - Continue CCB and BB  - Hold AC for now.  Will need clearance from GI when to restart. Was restarted on ASA on 6/14.    HFrEF  - No evidence of volume overload  - TTE in Feb. 2018 showed preserved LVF with no significant valvular diseases.   - TTE now showing EF 40-45% but unable to r/o SWMA.  This change in LVF can be a result of her NSTEMI    Monitor and replete electrolytes. Keep K>4.0 and Mg>2.0.   Further cardiac workup will depend on clinical course.   All other workup per primary team. Will followup.   D/C planning to [facility] in progress.     Magalie Suresh, NP-C  Cardiology

## 2019-06-16 NOTE — PROGRESS NOTE ADULT - PROBLEM SELECTOR PLAN 2
see above  multifactorial; suspect component of chronic abd pain r/t pvd as well  cont bowel regimen

## 2019-06-16 NOTE — PROGRESS NOTE ADULT - SUBJECTIVE AND OBJECTIVE BOX
Patient is a 84y old  Female who presents with a chief complaint of Melena, weakness, GIB (11 Jun 2019 18:42)      INTERVAL HPI/OVERNIGHT EVENTS: Patient seen and examined at bedside. Patient complaining of no nausea and no abd pain. Abdominal XR, c/w dense stools no obstruction. Patient had large BM yesterday after miralax and lactulose.     MEDICATIONS  (STANDING):  acetaminophen   Tablet .. 650 milliGRAM(s) Oral every 6 hours  ALBUTerol    0.083% 2.5 milliGRAM(s) Nebulizer every 8 hours  aspirin  chewable 81 milliGRAM(s) Oral daily  atorvastatin 20 milliGRAM(s) Oral at bedtime  chlorhexidine 2% Cloths 1 Application(s) Topical <User Schedule>  diltiazem    milliGRAM(s) Oral daily  docusate sodium 100 milliGRAM(s) Oral three times a day  fluconAZOLE   Tablet 100 milliGRAM(s) Oral daily  glycerin Suppository - Adult 1 Suppository(s) Rectal at bedtime  lactulose Syrup 30 Gram(s) Oral two times a day  lisinopril 2.5 milliGRAM(s) Oral daily  metoprolol succinate  milliGRAM(s) Oral two times a day  pantoprazole    Tablet 40 milliGRAM(s) Oral every 12 hours  polyethylene glycol 3350 17 Gram(s) Oral daily  senna 2 Tablet(s) Oral at bedtime  warfarin 5 milliGRAM(s) Oral once    MEDICATIONS  (PRN):  aluminum hydroxide/magnesium hydroxide/simethicone Suspension 30 milliLiter(s) Oral every 4 hours PRN Dyspepsia  morphine  - Injectable 1 milliGRAM(s) IV Push every 6 hours PRN Severe Pain (7 - 10)  ondansetron Injectable 4 milliGRAM(s) IV Push once PRN Nausea and/or Vomiting  oxyCODONE    IR 5 milliGRAM(s) Oral every 4 hours PRN Moderate Pain (4 - 6)      Allergies    No Known Allergies    Intolerances        REVIEW OF SYSTEMS:  CONSTITUTIONAL: No fever   HEENT: No headache, no sore throat  RESPIRATORY: No cough, wheezing, or shortness of breath  CARDIOVASCULAR: No chest pain, palpitations, or leg swelling  GASTROINTESTINAL: + abd pain, nausea, denies vomiting, or diarrhea  GENITOURINARY: No dysuria, frequency, or hematuria  NEUROLOGICAL: No focal weakness or dizziness  MUSCULOSKELETAL: No myalgias     Vital Signs Last 24 Hrs  T(C): 36.7 (16 Jun 2019 15:38), Max: 37 (15 Osmani 2019 23:20)  T(F): 98 (16 Jun 2019 15:38), Max: 98.6 (15 Osmani 2019 23:20)  HR: 70 (16 Jun 2019 15:38) (69 - 86)  BP: 149/77 (16 Jun 2019 15:38) (131/75 - 166/79)  BP(mean): --  RR: 19 (16 Jun 2019 15:38) (18 - 20)  SpO2: 98% (16 Jun 2019 15:38) (94% - 100%)    PHYSICAL EXAM:  GENERAL: NAD  HEENT: EOMI, moist mucous membranes  CHEST/LUNG: + crackles, no rales, wheezes, or rhonchi  HEART: Systolic murmur, S1, S2  ABDOMEN: BS+, soft, non- tender, nondistended  EXTREMITIES: No edema, cyanosis, or calf tenderness  NERVOUS SYSTEM: AA&Ox3    LABS:                        10.6   10.74 )-----------( 188      ( 16 Jun 2019 06:55 )             33.3     16 Jun 2019 06:55    142    |  106    |  24     ----------------------------<  94     3.6     |  30     |  1.00     Ca    8.2        16 Jun 2019 06:55      PT/INR - ( 16 Jun 2019 06:55 )   PT: 16.2 sec;   INR: 1.41 ratio         PTT - ( 15 Osmani 2019 07:52 )  PTT:27.9 sec    CAPILLARY BLOOD GLUCOSE        UCx       RADIOLOGY & ADDITIONAL TESTS:                Culture - Blood (collected 06-09-19 @ 18:15)  Source: .Blood Blood-Venous  Preliminary Report (06-10-19 @ 19:00):    No growth to date.    Culture - Blood (collected 06-09-19 @ 18:15)  Source: .Blood Blood-Venous  Preliminary Report (06-10-19 @ 19:00):    No growth to date.    Culture - Blood (collected 06-08-19 @ 16:36)  Source: .Blood Blood-Peripheral  Gram Stain (06-09-19 @ 10:02):    Growth in aerobic bottle: Gram Positive Cocci in Clusters  Final Report (06-10-19 @ 11:50):    Growth in aerobic bottle: Coag Negative Staphylococcus    Single set isolate, possible contaminant. Contact    Microbiology if susceptibility testing clinically    indicated.    "Due to technical problems, Proteus sp. will Not be reported as part of    the BC[facility] panel until further notice"    ***Blood Panel PCR results on this specimen are available    approximately 3 hours after the Gram stain result.***    Gram stain, PCR, and/or culture results may not always    correspond due to difference in methodologies.    ************************************************************    This PCR assay was performed using [employer].    The following targets are tested for: Enterococcus,    vancomycin resistant enterococci, Listeria monocytogenes,    coagulase negative staphylococci, S. aureus,    methicillin resistant S. aureus, Streptococcus agalactiae    (Group B), S. pneumoniae, S. pyogenes (Group A),    Acinetobacter baumannii, Enterobacter cloacae, E. coli,    Klebsiella oxytoca, K. pneumoniae, Proteus sp.,    Serratia marcescens, Haemophilus influenzae,    Neisseria meningitidis, Pseudomonas aeruginosa, Candida    albicans, C. glabrata, C krusei, C parapsilosis,    C. tropicalis and the KPC resistance gene.  Organism: Blood Culture PCR (06-10-19 @ 11:50)  Organism: Blood Culture PCR (06-10-19 @ 11:50)      -  Coagulase negative Staphylococcus: Detec      Method Type: PCR    Culture - Blood (collected 06-08-19 @ 16:36)  Source: .Blood Blood-Peripheral  Final Report (06-13-19 @ 17:00):    No growth at 5 days.        RADIOLOGY & ADDITIONAL TESTS:    Personally reviewed.     Consultant(s) Notes Reviewed:  [x] YES  [ ] NO

## 2019-06-16 NOTE — PROGRESS NOTE ADULT - SUBJECTIVE AND OBJECTIVE BOX
[facility] -- Dahiana Bañuelos, Toney Horner, Truman Falcon Savella  Office # [phone]      Follow Up:  Anemia/NSTEMI    Subjective/Observations: Seen and examined.  Pt sitting in bed resting denies cp, sob, palpitations or abdominal discomfort.  States mild back pain , but attributes it to lying in bed and feeling weak.  NAD.  No signs of orthopnea or PND.  No events overnight noted.       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  Osteoarthritis  Afib  PVD (peripheral vascular disease)  Neuropathy: (Right lower leg)  H/O cerebral aneurysm repair: brain clips  CVA (cerebral vascular accident): (&quot;Mini-stroke&quot;,1990&#x27;s)  Rheumatoid arthritis  Hyperlipidemia  Hypertension  S/P cataract surgery: (Left eye)  Elective surgery: (&quot;Twisted bowel&quot;, 2014)  Elective surgery: (Exision of cyst on liver, 1985)  S/P ORIF (open reduction internal fixation) fracture: Left hip fx (2012) &amp; R hip fx (2013)  H/O cerebral aneurysm repair: Brain clips (1978(  Renal stone: Cysto stent placement 10/1/2014  PVD (peripheral vascular disease): s/p RLE bypass x 3, most recent 3/2012 Right external iliac to PT bypass w/ PTFE (2012)  S/P FRED (total abdominal hysterectomy): (1987, Hx of &quot;ovarian cancer?&quot;)      MEDICATIONS  (STANDING):  acetaminophen   Tablet .. 650 milliGRAM(s) Oral every 6 hours  ALBUTerol    0.083% 2.5 milliGRAM(s) Nebulizer every 8 hours  aspirin  chewable 81 milliGRAM(s) Oral daily  atorvastatin 20 milliGRAM(s) Oral at bedtime  chlorhexidine 2% Cloths 1 Application(s) Topical <User Schedule>  diltiazem    milliGRAM(s) Oral daily  docusate sodium 100 milliGRAM(s) Oral three times a day  fluconAZOLE   Tablet 100 milliGRAM(s) Oral daily  glycerin Suppository - Adult 1 Suppository(s) Rectal at bedtime  lactulose Syrup 30 Gram(s) Oral two times a day  lisinopril 2.5 milliGRAM(s) Oral daily  metoprolol succinate  milliGRAM(s) Oral two times a day  pantoprazole    Tablet 40 milliGRAM(s) Oral every 12 hours  polyethylene glycol 3350 17 Gram(s) Oral daily  senna 2 Tablet(s) Oral at bedtime    MEDICATIONS  (PRN):  aluminum hydroxide/magnesium hydroxide/simethicone Suspension 30 milliLiter(s) Oral every 4 hours PRN Dyspepsia  morphine  - Injectable 1 milliGRAM(s) IV Push every 6 hours PRN Severe Pain (7 - 10)  ondansetron Injectable 4 milliGRAM(s) IV Push once PRN Nausea and/or Vomiting  oxyCODONE    IR 5 milliGRAM(s) Oral every 4 hours PRN Moderate Pain (4 - 6)      Allergies    No Known Allergies    Intolerances            Vital Signs Last 24 Hrs  T(C): 36.7 (16 Jun 2019 08:07), Max: 37 (15 Osmani 2019 23:20)  T(F): 98 (16 Jun 2019 08:07), Max: 98.6 (15 Osmani 2019 23:20)  HR: 86 (16 Jun 2019 08:07) (71 - 86)  BP: 145/83 (16 Jun 2019 08:07) (128/78 - 166/79)  BP(mean): --  RR: 18 (16 Jun 2019 08:07) (18 - 20)  SpO2: 100% (16 Jun 2019 08:07) (94% - 100%)    I&O's Summary    15 Osmani 2019 07:01  -  16 Jun 2019 07:00  --------------------------------------------------------  IN: 0 mL / OUT: 900 mL / NET: -900 mL          PHYSICAL EXAM:  TELE: SR 70s  Constitutional: NAD, awake and alert, frail  HEENT: Moist Mucous Membranes, Anicteric  Pulmonary: Non-labored, breath sounds are decreased in bases bilaterally, No wheezing, rales or rhonchi  Cardiovascular: Regular, S1 and S2, No murmurs, rubs, gallops or clicks  Gastrointestinal: Bowel Sounds present, soft, nontender.   Lymph: No peripheral edema. No lymphadenopathy.  Skin: No visible rashes or ulcers.  Psych:  Mood & affect flat    LABS: All Labs Reviewed:                        10.6   10.74 )-----------( 188      ( 16 Jun 2019 06:55 )             33.3                         10.8   12.11 )-----------( 172      ( 15 Osmani 2019 07:52 )             33.9                         11.4   14.69 )-----------( 150      ( 14 Jun 2019 06:53 )             35.0     16 Jun 2019 06:55    142    |  106    |  24     ----------------------------<  94     3.6     |  30     |  1.00   15 Osmani 2019 07:52    142    |  106    |  27     ----------------------------<  112    3.5     |  30     |  1.00   14 Jun 2019 06:53    143    |  107    |  24     ----------------------------<  103    3.6     |  28     |  0.93     Ca    8.2        16 Jun 2019 06:55  Ca    8.1        15 Osmani 2019 07:52  Ca    7.9        14 Jun 2019 06:53    TPro  5.4    /  Alb  2.3    /  TBili  1.1    /  DBili  x      /  AST  41     /  ALT  24     /  AlkPhos  59     15 Osmani 2019 07:52  TPro  5.3    /  Alb  2.4    /  TBili  1.3    /  DBili  .30    /  AST  22     /  ALT  19     /  AlkPhos  57     14 Jun 2019 06:53    PT/INR - ( 16 Jun 2019 06:55 )   PT: 16.2 sec;   INR: 1.41 ratio         PTT - ( 15 Osmani 2019 07:52 )  PTT:27.9 sec    < from: 12 Lead ECG (06.08.19 @ 10:35) >  Ventricular Rate 101 BPM    Atrial Rate 101 BPM    P-R Interval 200 ms    QRS Duration 106 ms    Q-T Interval 370 ms    QTC Calculation(Bezet) 479 ms    P Axis 102 degrees    R Axis -24 degrees    T Axis 147 degrees    Diagnosis Line Sinus tachycardia  ST & T wave abnormality, consider lateral ischemia  Abnormal ECG  When compared with ECG of 14-MAR-2019 21:03,  Minimal criteria for Anteroseptal infarct are no longer present  ST now depressed in Inferior leads  ST now depressed in Lateral leads  T wave inversion now evident in Lateral leads  Confirmed by Tru Ziegler MD ([id number]) on 6/9/2019 10:24:38 AM    < end of copied text >    < from: TTE Echo Doppler w/o Cont (06.10.19 @ 14:06) >   EXAM:  ECHO TTE WO CON COMP W DOPPLR         PROCEDURE DATE:  06/10/2019        INTERPRETATION:  INDICATION: NSTEMI    Blood Pressure 157/82    Height 154.9 cm     Weight 52.3 kg       BSA   1.49 sq m    Dimensions:    LA 3.6       Normal Values: 2.0 - 4.0 cm    Ao 2.9        Normal Values: 2.0 - 3.8 cm  SEPTUM 1.0       Normal Values: 0.6 - 1.2 cm  PWT 0.9       Normal Values: 0.6 - 1.1 cm  LVIDd 4.2         Normal Values: 3.0 - 5.6 cm  LVIDs 3.4         Normal Values: 1.8 - 4.0 cm      OBSERVATIONS:  Technically difficult study  Mitral Valve: Thickened leaflets, moderate MR.  Aortic Valve/Aorta: Sclerotic trileaflet aortic valve. Mild AI  Tricuspid Valve: normal with mild TR.  Pulmonic Valve: Mild PI  Left Atrium: Enlarged  Right Atrium:normal  Left Ventricle: Endocardium not well-visualized, estimated LVEF of   45-50%. Cannot rule out segmental dysfunction  Right Ventricle: Not well-visualized  Pericardium/Pleura: normal, no significant pericardial effusion.  Pulmonary/RV Pressure:estimated PA systolic pressure of 43 mmHg assuming   an RA pressure of 10 mmHg.  LV diastolic dysfunction is present  Small right-sided pleural effusion    Conclusion:   Technically difficult study  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 Cannot rule out segmental dysfunction  Left atrial enlargement  Sclerotic trileaflet aortic valve. Mild AI  Thickened mitral valve leaflets with moderate MR  Mild TR  Small right-sided pleural effusion                  JACQUELINE GOODEN   This document has been electronically signed. Jun 11 2019  8:16AM        < end of copied text >    < from: Xray Abdomen 1 View Portable, IMMEDIATE (06.14.19 @ 13:51) >  EXAM:  XR ABDOMEN PORTABLE IMMED 1V                            PROCEDURE DATE:  06/14/2019          INTERPRETATION:  Clinical information: Abdominal pain.    Abdomen supine portable view.    Multiple leads overlie the upper abdomen. Radiopaque tubing overlies the   left lower abdomen and pelvis.    There are scattered nondilated air-filled loops of small bowel with dense   stool noted within the colon, a nonspecific, nonobstructive appearing   bowel gas pattern.    The evaluation for free intraperitoneal air is limited on this exam.    Calcified gallstones are noted right abdomen.    Surgical clips are present left hemipelvis.    Partially imaged is ORIF bilateral proximal femurs.    Abdominal aortic calcification is present.    Impression:    Findings as discussed above.                FLAVIO BARAHONA M.D., ATTENDING RADIOLOGIST  This document has been electronically signed. Jun 14 2019  2:59PM                < end of copied text > [facility] -- Dahiana Bañuelos, Toney Horner, Truman Falcon Savella  Office # [phone]      Follow Up:  Anemia/NSTEMI    Subjective/Observations: Seen and examined.  Pt sitting in bed resting denies cp, sob, palpitations or abdominal discomfort.  States mild back pain , but attributes it to lying in bed and feeling weak.  NAD.  No signs of orthopnea or PND.  No events overnight noted.       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  Osteoarthritis  Afib  PVD (peripheral vascular disease)  Neuropathy: (Right lower leg)  H/O cerebral aneurysm repair: brain clips  CVA (cerebral vascular accident): (&quot;Mini-stroke&quot;,1990&#x27;s)  Rheumatoid arthritis  Hyperlipidemia  Hypertension  S/P cataract surgery: (Left eye)  Elective surgery: (&quot;Twisted bowel&quot;, 2014)  Elective surgery: (Exision of cyst on liver, 1985)  S/P ORIF (open reduction internal fixation) fracture: Left hip fx (2012) &amp; R hip fx (2013)  H/O cerebral aneurysm repair: Brain clips (1978(  Renal stone: Cysto stent placement 10/1/2014  PVD (peripheral vascular disease): s/p RLE bypass x 3, most recent 3/2012 Right external iliac to PT bypass w/ PTFE (2012)  S/P FRED (total abdominal hysterectomy): (1987, Hx of &quot;ovarian cancer?&quot;)      MEDICATIONS  (STANDING):  acetaminophen   Tablet .. 650 milliGRAM(s) Oral every 6 hours  ALBUTerol    0.083% 2.5 milliGRAM(s) Nebulizer every 8 hours  aspirin  chewable 81 milliGRAM(s) Oral daily  atorvastatin 20 milliGRAM(s) Oral at bedtime  chlorhexidine 2% Cloths 1 Application(s) Topical <User Schedule>  diltiazem    milliGRAM(s) Oral daily  docusate sodium 100 milliGRAM(s) Oral three times a day  fluconAZOLE   Tablet 100 milliGRAM(s) Oral daily  glycerin Suppository - Adult 1 Suppository(s) Rectal at bedtime  lactulose Syrup 30 Gram(s) Oral two times a day  lisinopril 2.5 milliGRAM(s) Oral daily  metoprolol succinate  milliGRAM(s) Oral two times a day  pantoprazole    Tablet 40 milliGRAM(s) Oral every 12 hours  polyethylene glycol 3350 17 Gram(s) Oral daily  senna 2 Tablet(s) Oral at bedtime    MEDICATIONS  (PRN):  aluminum hydroxide/magnesium hydroxide/simethicone Suspension 30 milliLiter(s) Oral every 4 hours PRN Dyspepsia  morphine  - Injectable 1 milliGRAM(s) IV Push every 6 hours PRN Severe Pain (7 - 10)  ondansetron Injectable 4 milliGRAM(s) IV Push once PRN Nausea and/or Vomiting  oxyCODONE    IR 5 milliGRAM(s) Oral every 4 hours PRN Moderate Pain (4 - 6)      Allergies    No Known Allergies    Intolerances            Vital Signs Last 24 Hrs  T(C): 36.7 (16 Jun 2019 08:07), Max: 37 (15 Osmani 2019 23:20)  T(F): 98 (16 Jun 2019 08:07), Max: 98.6 (15 Osmani 2019 23:20)  HR: 86 (16 Jun 2019 08:07) (71 - 86)  BP: 145/83 (16 Jun 2019 08:07) (128/78 - 166/79)  BP(mean): --  RR: 18 (16 Jun 2019 08:07) (18 - 20)  SpO2: 100% (16 Jun 2019 08:07) (94% - 100%)    I&O's Summary    15 Osmani 2019 07:01  -  16 Jun 2019 07:00  --------------------------------------------------------  IN: 0 mL / OUT: 900 mL / NET: -900 mL          PHYSICAL EXAM:  TELE: SR 70s  Constitutional: NAD, awake and alert, frail  HEENT: Moist Mucous Membranes, Anicteric  Pulmonary: Non-labored, breath sounds are decreased in bases bilaterally, No wheezing, rales or rhonchi  Cardiovascular: Regular, S1 and S2, 1/6 SM  Gastrointestinal: Bowel Sounds present, soft, nontender.   Lymph: No peripheral edema. No lymphadenopathy.  Skin: No visible rashes or ulcers.  Psych:  depressed Mood & affect flat    LABS: All Labs Reviewed:                        10.6   10.74 )-----------( 188      ( 16 Jun 2019 06:55 )             33.3                         10.8   12.11 )-----------( 172      ( 15 Osmani 2019 07:52 )             33.9                         11.4   14.69 )-----------( 150      ( 14 Jun 2019 06:53 )             35.0     16 Jun 2019 06:55    142    |  106    |  24     ----------------------------<  94     3.6     |  30     |  1.00   15 Osmani 2019 07:52    142    |  106    |  27     ----------------------------<  112    3.5     |  30     |  1.00   14 Jun 2019 06:53    143    |  107    |  24     ----------------------------<  103    3.6     |  28     |  0.93     Ca    8.2        16 Jun 2019 06:55  Ca    8.1        15 Osmani 2019 07:52  Ca    7.9        14 Jun 2019 06:53    TPro  5.4    /  Alb  2.3    /  TBili  1.1    /  DBili  x      /  AST  41     /  ALT  24     /  AlkPhos  59     15 Osmani 2019 07:52  TPro  5.3    /  Alb  2.4    /  TBili  1.3    /  DBili  .30    /  AST  22     /  ALT  19     /  AlkPhos  57     14 Jun 2019 06:53    PT/INR - ( 16 Jun 2019 06:55 )   PT: 16.2 sec;   INR: 1.41 ratio         PTT - ( 15 Osmani 2019 07:52 )  PTT:27.9 sec    < from: 12 Lead ECG (06.08.19 @ 10:35) >  Ventricular Rate 101 BPM    Atrial Rate 101 BPM    P-R Interval 200 ms    QRS Duration 106 ms    Q-T Interval 370 ms    QTC Calculation(Bezet) 479 ms    P Axis 102 degrees    R Axis -24 degrees    T Axis 147 degrees    Diagnosis Line Sinus tachycardia  ST & T wave abnormality, consider lateral ischemia  Abnormal ECG  When compared with ECG of 14-MAR-2019 21:03,  Minimal criteria for Anteroseptal infarct are no longer present  ST now depressed in Inferior leads  ST now depressed in Lateral leads  T wave inversion now evident in Lateral leads  Confirmed by Tru Ziegler MD ([id number]) on 6/9/2019 10:24:38 AM    < end of copied text >    < from: TTE Echo Doppler w/o Cont (06.10.19 @ 14:06) >   EXAM:  ECHO TTE WO CON COMP W DOPPLR         PROCEDURE DATE:  06/10/2019        INTERPRETATION:  INDICATION: NSTEMI    Blood Pressure 157/82    Height 154.9 cm     Weight 52.3 kg       BSA   1.49 sq m    Dimensions:    LA 3.6       Normal Values: 2.0 - 4.0 cm    Ao 2.9        Normal Values: 2.0 - 3.8 cm  SEPTUM 1.0       Normal Values: 0.6 - 1.2 cm  PWT 0.9       Normal Values: 0.6 - 1.1 cm  LVIDd 4.2         Normal Values: 3.0 - 5.6 cm  LVIDs 3.4         Normal Values: 1.8 - 4.0 cm      OBSERVATIONS:  Technically difficult study  Mitral Valve: Thickened leaflets, moderate MR.  Aortic Valve/Aorta: Sclerotic trileaflet aortic valve. Mild AI  Tricuspid Valve: normal with mild TR.  Pulmonic Valve: Mild PI  Left Atrium: Enlarged  Right Atrium:normal  Left Ventricle: Endocardium not well-visualized, estimated LVEF of   45-50%. Cannot rule out segmental dysfunction  Right Ventricle: Not well-visualized  Pericardium/Pleura: normal, no significant pericardial effusion.  Pulmonary/RV Pressure:estimated PA systolic pressure of 43 mmHg assuming   an RA pressure of 10 mmHg.  LV diastolic dysfunction is present  Small right-sided pleural effusion    Conclusion:   Technically difficult study  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 Cannot rule out segmental dysfunction  Left atrial enlargement  Sclerotic trileaflet aortic valve. Mild AI  Thickened mitral valve leaflets with moderate MR  Mild TR  Small right-sided pleural effusion                  JACQUELINE GOODEN   This document has been electronically signed. Jun 11 2019  8:16AM        < end of copied text >    < from: Xray Abdomen 1 View Portable, IMMEDIATE (06.14.19 @ 13:51) >  EXAM:  XR ABDOMEN PORTABLE IMMED 1V                            PROCEDURE DATE:  06/14/2019          INTERPRETATION:  Clinical information: Abdominal pain.    Abdomen supine portable view.    Multiple leads overlie the upper abdomen. Radiopaque tubing overlies the   left lower abdomen and pelvis.    There are scattered nondilated air-filled loops of small bowel with dense   stool noted within the colon, a nonspecific, nonobstructive appearing   bowel gas pattern.    The evaluation for free intraperitoneal air is limited on this exam.    Calcified gallstones are noted right abdomen.    Surgical clips are present left hemipelvis.    Partially imaged is ORIF bilateral proximal femurs.    Abdominal aortic calcification is present.    Impression:    Findings as discussed above.                FLAVIO BARAHONA M.D., ATTENDING RADIOLOGIST  This document has been electronically signed. Jun 14 2019  2:59PM                < end of copied text >

## 2019-06-16 NOTE — PROGRESS NOTE ADULT - PROBLEM SELECTOR PLAN 1
resolved  cta a/p neg for acute gi pathology  sp egd- findings of candida esophagitis, gastritis and bile reflux; no stigmata of recent bleeding  cont diflucan x 5 days  cont ppi bid, anti emetics prn, maalox prn for indigestion  monitor cbc, transfuse prn  diet as tolerated  monitor exam, prn pain control  no gi objection to asa/ac  will need op capsule

## 2019-06-17 ENCOUNTER — TRANSCRIPTION ENCOUNTER (OUTPATIENT)
Age: 84
End: 2019-06-17

## 2019-06-17 VITALS — SYSTOLIC BLOOD PRESSURE: 160 MMHG | DIASTOLIC BLOOD PRESSURE: 80 MMHG

## 2019-06-17 LAB
ANION GAP SERPL CALC-SCNC: 5 MMOL/L — SIGNIFICANT CHANGE UP (ref 5–17)
BUN SERPL-MCNC: 22 MG/DL — SIGNIFICANT CHANGE UP (ref 7–23)
CALCIUM SERPL-MCNC: 8.3 MG/DL — LOW (ref 8.5–10.1)
CHLORIDE SERPL-SCNC: 108 MMOL/L — SIGNIFICANT CHANGE UP (ref 96–108)
CO2 SERPL-SCNC: 31 MMOL/L — SIGNIFICANT CHANGE UP (ref 22–31)
CREAT SERPL-MCNC: 0.99 MG/DL — SIGNIFICANT CHANGE UP (ref 0.5–1.3)
GLUCOSE SERPL-MCNC: 99 MG/DL — SIGNIFICANT CHANGE UP (ref 70–99)
HCT VFR BLD CALC: 33.8 % — LOW (ref 34.5–45)
HGB BLD-MCNC: 10.6 G/DL — LOW (ref 11.5–15.5)
INR BLD: 1.98 RATIO — HIGH (ref 0.88–1.16)
MCHC RBC-ENTMCNC: 29.5 PG — SIGNIFICANT CHANGE UP (ref 27–34)
MCHC RBC-ENTMCNC: 31.4 GM/DL — LOW (ref 32–36)
MCV RBC AUTO: 94.2 FL — SIGNIFICANT CHANGE UP (ref 80–100)
NRBC # BLD: 0 /100 WBCS — SIGNIFICANT CHANGE UP (ref 0–0)
PLATELET # BLD AUTO: 197 K/UL — SIGNIFICANT CHANGE UP (ref 150–400)
POTASSIUM SERPL-MCNC: 4.1 MMOL/L — SIGNIFICANT CHANGE UP (ref 3.5–5.3)
POTASSIUM SERPL-SCNC: 4.1 MMOL/L — SIGNIFICANT CHANGE UP (ref 3.5–5.3)
PROTHROM AB SERPL-ACNC: 22.9 SEC — HIGH (ref 10–12.9)
RBC # BLD: 3.59 M/UL — LOW (ref 3.8–5.2)
RBC # FLD: 17 % — HIGH (ref 10.3–14.5)
SODIUM SERPL-SCNC: 144 MMOL/L — SIGNIFICANT CHANGE UP (ref 135–145)
WBC # BLD: 8.96 K/UL — SIGNIFICANT CHANGE UP (ref 3.8–10.5)
WBC # FLD AUTO: 8.96 K/UL — SIGNIFICANT CHANGE UP (ref 3.8–10.5)

## 2019-06-17 PROCEDURE — 36430 TRANSFUSION BLD/BLD COMPNT: CPT

## 2019-06-17 PROCEDURE — 96376 TX/PRO/DX INJ SAME DRUG ADON: CPT

## 2019-06-17 PROCEDURE — 86850 RBC ANTIBODY SCREEN: CPT

## 2019-06-17 PROCEDURE — 93306 TTE W/DOPPLER COMPLETE: CPT

## 2019-06-17 PROCEDURE — 96367 TX/PROPH/DG ADDL SEQ IV INF: CPT

## 2019-06-17 PROCEDURE — 96375 TX/PRO/DX INJ NEW DRUG ADDON: CPT

## 2019-06-17 PROCEDURE — 94760 N-INVAS EAR/PLS OXIMETRY 1: CPT

## 2019-06-17 PROCEDURE — 85610 PROTHROMBIN TIME: CPT

## 2019-06-17 PROCEDURE — 82272 OCCULT BLD FECES 1-3 TESTS: CPT

## 2019-06-17 PROCEDURE — 97116 GAIT TRAINING THERAPY: CPT

## 2019-06-17 PROCEDURE — 94640 AIRWAY INHALATION TREATMENT: CPT

## 2019-06-17 PROCEDURE — 83605 ASSAY OF LACTIC ACID: CPT

## 2019-06-17 PROCEDURE — 97163 PT EVAL HIGH COMPLEX 45 MIN: CPT

## 2019-06-17 PROCEDURE — 87150 DNA/RNA AMPLIFIED PROBE: CPT

## 2019-06-17 PROCEDURE — 93005 ELECTROCARDIOGRAM TRACING: CPT

## 2019-06-17 PROCEDURE — 85730 THROMBOPLASTIN TIME PARTIAL: CPT

## 2019-06-17 PROCEDURE — 84484 ASSAY OF TROPONIN QUANT: CPT

## 2019-06-17 PROCEDURE — 86901 BLOOD TYPING SEROLOGIC RH(D): CPT

## 2019-06-17 PROCEDURE — 85027 COMPLETE CBC AUTOMATED: CPT

## 2019-06-17 PROCEDURE — 99291 CRITICAL CARE FIRST HOUR: CPT

## 2019-06-17 PROCEDURE — 82553 CREATINE MB FRACTION: CPT

## 2019-06-17 PROCEDURE — 99232 SBSQ HOSP IP/OBS MODERATE 35: CPT

## 2019-06-17 PROCEDURE — 71045 X-RAY EXAM CHEST 1 VIEW: CPT

## 2019-06-17 PROCEDURE — 82550 ASSAY OF CK (CPK): CPT

## 2019-06-17 PROCEDURE — 87040 BLOOD CULTURE FOR BACTERIA: CPT

## 2019-06-17 PROCEDURE — 99239 HOSP IP/OBS DSCHRG MGMT >30: CPT

## 2019-06-17 PROCEDURE — 84100 ASSAY OF PHOSPHORUS: CPT

## 2019-06-17 PROCEDURE — 86923 COMPATIBILITY TEST ELECTRIC: CPT

## 2019-06-17 PROCEDURE — 83735 ASSAY OF MAGNESIUM: CPT

## 2019-06-17 PROCEDURE — 36415 COLL VENOUS BLD VENIPUNCTURE: CPT

## 2019-06-17 PROCEDURE — 80053 COMPREHEN METABOLIC PANEL: CPT

## 2019-06-17 PROCEDURE — 80076 HEPATIC FUNCTION PANEL: CPT

## 2019-06-17 PROCEDURE — 99221 1ST HOSP IP/OBS SF/LOW 40: CPT

## 2019-06-17 PROCEDURE — 97530 THERAPEUTIC ACTIVITIES: CPT

## 2019-06-17 PROCEDURE — 83690 ASSAY OF LIPASE: CPT

## 2019-06-17 PROCEDURE — 71275 CT ANGIOGRAPHY CHEST: CPT

## 2019-06-17 PROCEDURE — 74019 RADEX ABDOMEN 2 VIEWS: CPT

## 2019-06-17 PROCEDURE — 86900 BLOOD TYPING SEROLOGIC ABO: CPT

## 2019-06-17 PROCEDURE — 80048 BASIC METABOLIC PNL TOTAL CA: CPT

## 2019-06-17 PROCEDURE — 96361 HYDRATE IV INFUSION ADD-ON: CPT

## 2019-06-17 PROCEDURE — 96365 THER/PROPH/DIAG IV INF INIT: CPT | Mod: XU

## 2019-06-17 PROCEDURE — P9016: CPT

## 2019-06-17 PROCEDURE — 74018 RADEX ABDOMEN 1 VIEW: CPT

## 2019-06-17 PROCEDURE — 74174 CTA ABD&PLVS W/CONTRAST: CPT

## 2019-06-17 RX ORDER — LANOLIN ALCOHOL/MO/W.PET/CERES
1 CREAM (GRAM) TOPICAL
Qty: 0 | Refills: 0 | DISCHARGE
Start: 2019-06-17

## 2019-06-17 RX ORDER — DILTIAZEM HCL 120 MG
1 CAPSULE, EXT RELEASE 24 HR ORAL
Qty: 30 | Refills: 0
Start: 2019-06-17 | End: 2019-07-16

## 2019-06-17 RX ORDER — FLUCONAZOLE 150 MG/1
1 TABLET ORAL
Qty: 0 | Refills: 0 | DISCHARGE
Start: 2019-06-17

## 2019-06-17 RX ORDER — METOPROLOL TARTRATE 50 MG
1.5 TABLET ORAL
Qty: 0 | Refills: 0 | DISCHARGE
Start: 2019-06-17 | End: 2019-07-16

## 2019-06-17 RX ORDER — PANTOPRAZOLE SODIUM 20 MG/1
1 TABLET, DELAYED RELEASE ORAL
Qty: 0 | Refills: 0 | DISCHARGE
Start: 2019-06-17

## 2019-06-17 RX ORDER — METOPROLOL TARTRATE 50 MG
1 TABLET ORAL
Qty: 60 | Refills: 0
Start: 2019-06-17 | End: 2019-07-16

## 2019-06-17 RX ORDER — DILTIAZEM HCL 120 MG
1 CAPSULE, EXT RELEASE 24 HR ORAL
Qty: 0 | Refills: 0 | DISCHARGE

## 2019-06-17 RX ORDER — LISINOPRIL 2.5 MG/1
1 TABLET ORAL
Qty: 60 | Refills: 0
Start: 2019-06-17 | End: 2019-07-16

## 2019-06-17 RX ADMIN — PANTOPRAZOLE SODIUM 40 MILLIGRAM(S): 20 TABLET, DELAYED RELEASE ORAL at 05:32

## 2019-06-17 RX ADMIN — Medication 81 MILLIGRAM(S): at 12:33

## 2019-06-17 RX ADMIN — ALBUTEROL 2.5 MILLIGRAM(S): 90 AEROSOL, METERED ORAL at 15:48

## 2019-06-17 RX ADMIN — CHLORHEXIDINE GLUCONATE 1 APPLICATION(S): 213 SOLUTION TOPICAL at 05:24

## 2019-06-17 RX ADMIN — Medication 100 MILLIGRAM(S): at 05:32

## 2019-06-17 RX ADMIN — ALBUTEROL 2.5 MILLIGRAM(S): 90 AEROSOL, METERED ORAL at 07:57

## 2019-06-17 RX ADMIN — FLUCONAZOLE 100 MILLIGRAM(S): 150 TABLET ORAL at 12:33

## 2019-06-17 RX ADMIN — LISINOPRIL 2.5 MILLIGRAM(S): 2.5 TABLET ORAL at 05:32

## 2019-06-17 RX ADMIN — Medication 10 MILLIGRAM(S): at 12:33

## 2019-06-17 RX ADMIN — Medication 650 MILLIGRAM(S): at 13:03

## 2019-06-17 RX ADMIN — Medication 650 MILLIGRAM(S): at 05:32

## 2019-06-17 RX ADMIN — Medication 120 MILLIGRAM(S): at 05:32

## 2019-06-17 RX ADMIN — Medication 650 MILLIGRAM(S): at 12:33

## 2019-06-17 RX ADMIN — LACTULOSE 30 GRAM(S): 10 SOLUTION ORAL at 05:33

## 2019-06-17 NOTE — DISCHARGE NOTE NURSING/CASE MANAGEMENT/SOCIAL WORK - NSDCPEPTSTRK_GEN_ALL_CORE
Risk factors for stroke/Stroke support groups for patients, families, and friends/Stroke warning signs and symptoms/Signs and symptoms of stroke/Prescribed medications/Stroke education booklet/Call 911 for stroke/Need for follow up after discharge

## 2019-06-17 NOTE — PROGRESS NOTE ADULT - SUBJECTIVE AND OBJECTIVE BOX
Date/Time Patient Seen:  		  Referring MD:   Data Reviewed	       Patient is a 84y old  Female who presents with a chief complaint of GIB (16 Jun 2019 15:50)      Subjective/HPI     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  Osteoarthritis  Afib  PVD (peripheral vascular disease)  Neuropathy: (Right lower leg)  Gout  H/O cerebral aneurysm repair: brain clips  CVA (cerebral vascular accident): (&quot;Mini-stroke&quot;,1990&#x27;s)  Rheumatoid arthritis  Asthma  Hyperlipidemia  Hypertension  S/P cataract surgery: (Left eye)  Elective surgery: (&quot;Twisted bowel&quot;, 2014)  Elective surgery: (Exision of cyst on liver, 1985)  S/P ORIF (open reduction internal fixation) fracture: Left hip fx (2012) &amp; R hip fx (2013)  H/O cerebral aneurysm repair: Brain clips (1978(  Renal stone: Cysto stent placement 10/1/2014  PVD (peripheral vascular disease): s/p RLE bypass x 3, most recent 3/2012 Right external iliac to PT bypass w/ PTFE (2012)  S/P FRED (total abdominal hysterectomy): (1987, Hx of &quot;ovarian cancer?&quot;)        Medication list         MEDICATIONS  (STANDING):  acetaminophen   Tablet .. 650 milliGRAM(s) Oral every 6 hours  ALBUTerol    0.083% 2.5 milliGRAM(s) Nebulizer every 8 hours  aspirin  chewable 81 milliGRAM(s) Oral daily  atorvastatin 20 milliGRAM(s) Oral at bedtime  chlorhexidine 2% Cloths 1 Application(s) Topical <User Schedule>  diltiazem    milliGRAM(s) Oral daily  docusate sodium 100 milliGRAM(s) Oral three times a day  fluconAZOLE   Tablet 100 milliGRAM(s) Oral daily  glycerin Suppository - Adult 1 Suppository(s) Rectal at bedtime  lactulose Syrup 30 Gram(s) Oral two times a day  lisinopril 2.5 milliGRAM(s) Oral daily  melatonin 5 milliGRAM(s) Oral at bedtime  metoprolol succinate  milliGRAM(s) Oral two times a day  pantoprazole    Tablet 40 milliGRAM(s) Oral every 12 hours  polyethylene glycol 3350 17 Gram(s) Oral daily  senna 2 Tablet(s) Oral at bedtime    MEDICATIONS  (PRN):  aluminum hydroxide/magnesium hydroxide/simethicone Suspension 30 milliLiter(s) Oral every 4 hours PRN Dyspepsia  morphine  - Injectable 1 milliGRAM(s) IV Push every 6 hours PRN Severe Pain (7 - 10)  ondansetron Injectable 4 milliGRAM(s) IV Push once PRN Nausea and/or Vomiting         Vitals log        ICU Vital Signs Last 24 Hrs  T(C): 36.8 (17 Jun 2019 05:29), Max: 37 (16 Jun 2019 12:27)  T(F): 98.2 (17 Jun 2019 05:29), Max: 98.6 (16 Jun 2019 12:27)  HR: 67 (17 Jun 2019 05:29) (67 - 86)  BP: 160/77 (17 Jun 2019 05:29) (137/74 - 160/77)  BP(mean): --  ABP: --  ABP(mean): --  RR: 19 (17 Jun 2019 05:29) (18 - 19)  SpO2: 100% (17 Jun 2019 05:29) (97% - 100%)           Input and Output:  I&O's Detail      Lab Data                        10.6   8.96  )-----------( 197      ( 17 Jun 2019 06:12 )             33.8     06-17    144  |  108  |  22  ----------------------------<  99  4.1   |  31  |  0.99    Ca    8.3<L>      17 Jun 2019 06:12    TPro  5.4<L>  /  Alb  2.3<L>  /  TBili  1.1  /  DBili  x   /  AST  41<H>  /  ALT  24  /  AlkPhos  59  06-15            Review of Systems	      Objective     Physical Examination    heart s1s2  lung dec BS      Pertinent Lab findings & Imaging      [place]:  NO   Adequate UO     I&O's Detail           Discussed with:     Cultures:	        Radiology

## 2019-06-17 NOTE — DISCHARGE NOTE PROVIDER - HOSPITAL COURSE
85 yo female w/ PMH of afib on coumadin, CVA and hx of aneurysm repair, HTN, HLD, mitral valve prolapse, PVD s/p stents x 3 (RLE), rheumatoid arthritis, cataracts of the L eye came to ER with complaints of bloody stool. As per patient the day before, she started having abdominal pain with "black stool" and coffee ground emesis. She denied any recent chest pain, SOB. Noted to have Hbg of 4.9. Given Kcentra, Vit K to reverse coumadin. Transfusion of pRBC x3 was done. CTA done which showed no aneurysm leakage. Evaluated by ICU but patient considered stable to be admitted to telemetry floors. Patient's EKG showed acute ischemia and cardiac enzymes where elevated likely secondary to severe anemia. Patient's toprol was increased and no antiplatelet was added due to bleed. Hg was maintained above 9 in setting of NSTEMI. EGD showed no evidence of acute GIB although it showed esophageal candidiasis. Fluconazole was started. GI recommends outpatient occult bleeding capsule work up. H/H is stable. Patient restarted on coumadin. Hospital course complicated by abdominal pain secondary to constipation. Patient's pain resolved after laxatives. Laxatives were discontinued.        Vital Signs Last 24 Hrs    T(C): 36.6 (17 Jun 2019 07:25), Max: 37 (16 Jun 2019 12:27)    T(F): 97.9 (17 Jun 2019 07:25), Max: 98.6 (16 Jun 2019 12:27)    HR: 71 (17 Jun 2019 07:59) (67 - 78)    BP: 166/79 (17 Jun 2019 07:25) (137/74 - 166/79)    BP(mean): --    RR: 14 (17 Jun 2019 07:25) (14 - 19)    SpO2: 99% (17 Jun 2019 07:59) (98% - 100%)        Physical Exam:    General: Well developed, well nourished, NAD    HEENT: NCAT, +L cataract, +R eye extropia, moist mucous membranes     Neck: Supple, nontender, no mass    Neurology: A&Ox3, nonfocal, CN II-XII grossly intact, sensation intact    Respiratory: CTA B/L, No W/R/R    CV: Irregular, +S1/S2, no murmurs, rubs or gallops    Abdominal: Soft, mild tenderness on RUQ, ND +BSx4    Extremities: No C/C/E, + peripheral pulses    MSK: Normal ROM, no joint erythema or warmth, no joint swelling     Skin: warm, dry, normal color

## 2019-06-17 NOTE — PROGRESS NOTE ADULT - SUBJECTIVE AND OBJECTIVE BOX
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  Osteoarthritis  Afib  PVD (peripheral vascular disease)  Neuropathy: (Right lower leg)  H/O cerebral aneurysm repair: brain clips  CVA (cerebral vascular accident): (&quot;Mini-stroke&quot;,1990&#x27;s)  Rheumatoid arthritis  Hyperlipidemia  Hypertension  S/P cataract surgery: (Left eye)  Elective surgery: (&quot;Twisted bowel&quot;, 2014)  Elective surgery: (Exision of cyst on liver, 1985)  S/P ORIF (open reduction internal fixation) fracture: Left hip fx (2012) &amp; R hip fx (2013)  H/O cerebral aneurysm repair: Brain clips (1978(  Renal stone: Cysto stent placement 10/1/2014  PVD (peripheral vascular disease): s/p RLE bypass x 3, most recent 3/2012 Right external iliac to PT bypass w/ PTFE (2012)  S/P FRED (total abdominal hysterectomy): (1987, Hx of &quot;ovarian cancer?&quot;)    MEDICATIONS  (STANDING):  acetaminophen   Tablet .. 650 milliGRAM(s) Oral every 6 hours  ALBUTerol    0.083% 2.5 milliGRAM(s) Nebulizer every 8 hours  aspirin  chewable 81 milliGRAM(s) Oral daily  atorvastatin 20 milliGRAM(s) Oral at bedtime  chlorhexidine 2% Cloths 1 Application(s) Topical <User Schedule>  dicyclomine 10 milliGRAM(s) Oral three times a day before meals  diltiazem    milliGRAM(s) Oral daily  fluconAZOLE   Tablet 100 milliGRAM(s) Oral daily  lisinopril 2.5 milliGRAM(s) Oral daily  melatonin 5 milliGRAM(s) Oral at bedtime  metoprolol succinate  milliGRAM(s) Oral two times a day  pantoprazole    Tablet 40 milliGRAM(s) Oral every 12 hours    MEDICATIONS  (PRN):  aluminum hydroxide/magnesium hydroxide/simethicone Suspension 30 milliLiter(s) Oral every 4 hours PRN Dyspepsia  morphine  - Injectable 1 milliGRAM(s) IV Push every 6 hours PRN Severe Pain (7 - 10)  ondansetron Injectable 4 milliGRAM(s) IV Push once PRN Nausea and/or Vomiting    Allergies    No Known Allergies    Intolerances      Vital Signs Last 24 Hrs  T(C): 36.7 (17 Jun 2019 12:21), Max: 36.9 (16 Jun 2019 23:07)  T(F): 98.1 (17 Jun 2019 12:21), Max: 98.4 (16 Jun 2019 23:07)  HR: 77 (17 Jun 2019 12:21) (67 - 78)  BP: 144/89 (17 Jun 2019 12:21) (137/74 - 166/79)  BP(mean): --  RR: 17 (17 Jun 2019 12:21) (14 - 19)  SpO2: 100% (17 Jun 2019 12:21) (98% - 100%)  I&O's Summary      PHYSICAL EXAM:  TELE:   Constitutional: NAD, awake and alert, well-developed  HEENT: Moist Mucous Membranes, Anicteric  Pulmonary: Non-labored, breath sounds are clear bilaterally, No wheezing, rales or rhonchi  Cardiovascular: Regular, S1 and S2, No murmurs, rubs, gallops or clicks  Gastrointestinal: Bowel Sounds present, soft, nontender.   Lymph: No peripheral edema. No lymphadenopathy.  Skin: No visible rashes or ulcers.  Psych:  Mood & affect appropriate  LABS: All Labs Reviewed:                        10.6   8.96  )-----------( 197      ( 17 Jun 2019 06:12 )             33.8                         10.6   10.74 )-----------( 188      ( 16 Jun 2019 06:55 )             33.3                         10.8   12.11 )-----------( 172      ( 15 Osmani 2019 07:52 )             33.9     17 Jun 2019 06:12    144    |  108    |  22     ----------------------------<  99     4.1     |  31     |  0.99   16 Jun 2019 06:55    142    |  106    |  24     ----------------------------<  94     3.6     |  30     |  1.00   15 Osmani 2019 07:52    142    |  106    |  27     ----------------------------<  112    3.5     |  30     |  1.00     Ca    8.3        17 Jun 2019 06:12  Ca    8.2        16 Jun 2019 06:55  Ca    8.1        15 Osmani 2019 07:52    TPro  5.4    /  Alb  2.3    /  TBili  1.1    /  DBili  x      /  AST  41     /  ALT  24     /  AlkPhos  59     15 Osmani 2019 07:52    PT/INR - ( 17 Jun 2019 06:12 )   PT: 22.9 sec;   INR: 1.98 ratio                  Isabel Quintero [facility]  Cardiology   Spectra #[phone] [facility] -- Dahiana Bañuelos, Toney Horner, Truman Falcon Savella  Office # [phone]    Follow Up:  NSTEMI vs demand ischemia, Preop Eval    Subjective/Observations: Awake and alert, nasal canula in use but denies any respiratory discomfort.  Still c/o mild abdominal pain but no nausea or vomiting.  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  Osteoarthritis  Afib  PVD (peripheral vascular disease)  Neuropathy: (Right lower leg)  H/O cerebral aneurysm repair: brain clips  CVA (cerebral vascular accident): (&quot;Mini-stroke&quot;,1990&#x27;s)  Rheumatoid arthritis  Hyperlipidemia  Hypertension  S/P cataract surgery: (Left eye)  Elective surgery: (&quot;Twisted bowel&quot;, 2014)  Elective surgery: (Exision of cyst on liver, 1985)  S/P ORIF (open reduction internal fixation) fracture: Left hip fx (2012) &amp; R hip fx (2013)  H/O cerebral aneurysm repair: Brain clips (1978(  Renal stone: Cysto stent placement 10/1/2014  PVD (peripheral vascular disease): s/p RLE bypass x 3, most recent 3/2012 Right external iliac to PT bypass w/ PTFE (2012)  S/P FRED (total abdominal hysterectomy): (1987, Hx of &quot;ovarian cancer?&quot;)    MEDICATIONS  (STANDING):  acetaminophen   Tablet .. 650 milliGRAM(s) Oral every 6 hours  ALBUTerol    0.083% 2.5 milliGRAM(s) Nebulizer every 8 hours  aspirin  chewable 81 milliGRAM(s) Oral daily  atorvastatin 20 milliGRAM(s) Oral at bedtime  chlorhexidine 2% Cloths 1 Application(s) Topical <User Schedule>  dicyclomine 10 milliGRAM(s) Oral three times a day before meals  diltiazem    milliGRAM(s) Oral daily  fluconAZOLE   Tablet 100 milliGRAM(s) Oral daily  lisinopril 2.5 milliGRAM(s) Oral daily  melatonin 5 milliGRAM(s) Oral at bedtime  metoprolol succinate  milliGRAM(s) Oral two times a day  pantoprazole    Tablet 40 milliGRAM(s) Oral every 12 hours    MEDICATIONS  (PRN):  aluminum hydroxide/magnesium hydroxide/simethicone Suspension 30 milliLiter(s) Oral every 4 hours PRN Dyspepsia  morphine  - Injectable 1 milliGRAM(s) IV Push every 6 hours PRN Severe Pain (7 - 10)  ondansetron Injectable 4 milliGRAM(s) IV Push once PRN Nausea and/or Vomiting    Allergies    No Known Allergies    Intolerances    Vital Signs Last 24 Hrs  T(C): 36.7 (17 Jun 2019 12:21), Max: 36.9 (16 Jun 2019 23:07)  T(F): 98.1 (17 Jun 2019 12:21), Max: 98.4 (16 Jun 2019 23:07)  HR: 77 (17 Jun 2019 12:21) (67 - 78)  BP: 144/89 (17 Jun 2019 12:21) (137/74 - 166/79)  BP(mean): --  RR: 17 (17 Jun 2019 12:21) (14 - 19)  SpO2: 100% (17 Jun 2019 12:21) (98% - 100%)  I&O's Summary      PHYSICAL EXAM:  TELE: NSR  Constitutional: NAD, awake and alert, cachectic  HEENT: Moist Mucous Membranes, Anicteric  Pulmonary: Non-labored, breath sounds are clear bilaterally, No wheezing, rales or rhonchi  Cardiovascular: Regular, S1 and S2, No murmurs, rubs, gallops or clicks  Gastrointestinal: Bowel Sounds present, soft, nontender.   Lymph: No peripheral edema. No lymphadenopathy.  Skin: No visible rashes or ulcers.  Pale in color  Psych:  Mood & affect appropriate  LABS: All Labs Reviewed:                        10.6   8.96  )-----------( 197      ( 17 Jun 2019 06:12 )             33.8                         10.6   10.74 )-----------( 188      ( 16 Jun 2019 06:55 )             33.3                         10.8   12.11 )-----------( 172      ( 15 Osmani 2019 07:52 )             33.9     17 Jun 2019 06:12    144    |  108    |  22     ----------------------------<  99     4.1     |  31     |  0.99   16 Jun 2019 06:55    142    |  106    |  24     ----------------------------<  94     3.6     |  30     |  1.00   15 Osmani 2019 07:52    142    |  106    |  27     ----------------------------<  112    3.5     |  30     |  1.00     Ca    8.3        17 Jun 2019 06:12  Ca    8.2        16 Jun 2019 06:55  Ca    8.1        15 Osmani 2019 07:52    TPro  5.4    /  Alb  2.3    /  TBili  1.1    /  DBili  x      /  AST  41     /  ALT  24     /  AlkPhos  59     15 Osmani 2019 07:52    PT/INR - ( 17 Jun 2019 06:12 )   PT: 22.9 sec;   INR: 1.98 ratio      < from: TTE Echo Doppler w/o Cont (06.10.19 @ 14:06) >     EXAM:  ECHO TTE WO CON COMP W DOPPLR         PROCEDURE DATE:  06/10/2019        INTERPRETATION:  INDICATION: NSTEMI    Blood Pressure 157/82    Height 154.9 cm     Weight 52.3 kg       BSA   1.49 sq m    Dimensions:    LA 3.6       Normal Values: 2.0 - 4.0 cm    Ao 2.9        Normal Values: 2.0 - 3.8 cm  SEPTUM 1.0       Normal Values: 0.6 - 1.2 cm  PWT 0.9       Normal Values: 0.6 - 1.1 cm  LVIDd 4.2         Normal Values: 3.0 - 5.6 cm  LVIDs 3.4         Normal Values: 1.8 - 4.0 cm      OBSERVATIONS:  Technically difficult study  Mitral Valve: Thickened leaflets, moderate MR.  Aortic Valve/Aorta: Sclerotic trileaflet aortic valve. Mild AI  Tricuspid Valve: normal with mild TR.  Pulmonic Valve: Mild PI  Left Atrium: Enlarged  Right Atrium:normal  Left Ventricle: Endocardium not well-visualized, estimated LVEF of   45-50%. Cannot rule out segmental dysfunction  Right Ventricle: Not well-visualized  Pericardium/Pleura: normal, no significant pericardial effusion.  Pulmonary/RV Pressure:estimated PA systolic pressure of 43 mmHg assuming   an RA pressure of 10 mmHg.  LV diastolic dysfunction is present  Small right-sided pleural effusion    Conclusion:   Technically difficult study  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 Cannot rule out segmental dysfunction  Left atrial enlargement  Sclerotic trileaflet aortic valve. Mild AI  Thickened mitral valve leaflets with moderate MR  Mild TR  Small right-sided pleural effusion      JACQUELINE GOODEN   This document has been electronically signed. Jun 11 2019  8:16AM      < end of copied text >    < from: Xray Chest 1 View- PORTABLE-Urgent (06.12.19 @ 09:19) >    EXAM:  XR CHEST PORTABLE URGENT 1V                            PROCEDURE DATE:  06/12/2019          INTERPRETATION:  Chest portable.    Clinical History: Chest pain. Abnormal chest sounds.    Comparison: 6/8/2019.    Single AP view submitted.    Theevaluation of the cardiomediastinal silhouette is limited on portable   technique.  There is a tortuous, calcified aorta.    Low lung volumes are present.      There is prominence of the bronchovascular markings at the mid to lower   lung zones bilaterally.  There is more confluent airspace consolidation in the right perihilar   upper lung zone, findings which could reflect bronchopneumonia in the   proper clinical setting.    Minimal blunting at the right costophrenic angle may reflect trace   pleural effusion.    Impression:    Findings as discussed above.    FLAVIO BARAHONA M.D., ATTENDING RADIOLOGIST  This document has been electronically signed. Jun 12 2019  9:52AM     < end of copied text >    < from: CT Angio Chest w/ IV Cont (06.08.19 @ 13:02) >    EXAM:  CT ANGIO CHEST (W)AW IC                          EXAM:  CT ANGIO ABD PELV (W)AW IC                            PROCEDURE DATE:  06/08/2019          INTERPRETATION:  CTA CHEST/ABDOMEN/PELVIS:     CLINICAL INFORMATION: Pain with abdominal aortic aneurysm. Rule out   rupture    COMPARISON: CT abdomen and pelvis of 1/13/2019 and older study of   7/21/2018..    PROCEDURE:  Using multislice helical CT, 2.5 mm sections were obtained   from the thoracic inlet through the ischial tuberosities withthe   administration of intravenous contrast. Oral contrast was not   administered. 95 cc of Omnipaque 350 were administered. MIP sequences are   also performed. Preliminary noncontrast imaging was also performed.    Coronal and sagittal reformationswere performed by  CT technologist and   made available for review.    FINDINGS:  The visualized structures of the lower neck are unremarkable.   The visualized aorta is of normal course and caliber. The heart is not   enlarged. There is no evidence of pericardial effusion. There is   atherosclerotic calcification of the thoracic aorta and its branches.   There is tracheobronchial calcification. There is coronary artery   calcification.    There is no evidence of axillary, hilar, or mediastinal lymphadenopathy.    No focal lung consolidations identified. No evidence of pleural effusion   or pneumothorax. No pulmonary nodules identified. There is extensive   centrilobular emphysema. There is scarring at the lung bases with   probable honeycombing at the right lung base      The liver is of normal contour. there is no evidence of mass. The   gallbladder is remarkable for multiple calcified gallstones No evidence   of intra or extrahepatic biliary dilatation.     The pancreas, spleen, and adrenal glands are grossly unremarkable. There   is no evidence of hydronephrosis or perinephric stranding. No evidence of    nephrolithiasis.The bladder is unremarkable. There is nonobstructive   right lower pole renal calculus measuring 7 mm. There isno   hydronephrosis.    No evidence of lymphadenopathy utilizing CT size criteria. The aorta   demonstrates saccular aneurysm of the mid aorta just below the level of   the renal arteries with a configuration similar to that seen on the   previous study. There is atherosclerotic calcification of the abdominal   aorta and its branches. A stent is seen on the right at the level of the   external iliac artery with graft extending to the groin.    There is no evidence of bowel obstruction. There is no evidence of   pneumoperitoneum or free fluid. There is diverticulosis without   diverticulitis. Surgical clips are seen in the left pelvis. Surgical   sutures are again seen in the right lower quadrant and in the small bowel    The visualized osseous structures demonstrate osteopenia and degenerative   changes of the spine. There is bilateral hip ORIF.    IMPRESSION:  No significant change in the appearance of infrarenal   abdominal aortic aneurysm without evidence of leakage.  Small calcified gallstones are again noted  Nonobstructive right lower pole renal calculus is again seen in  Postsurgical changes with surgical clips in the left pelvis and right   lower quadrant small bowel sutures.  Right external iliac stent with graft extending into the right groin.   There is no flow seen within the stent/graft similar to the prior   contrast-enhanced study suggesting occlusion.    SHIMON TOSCANO M.D.,ATTENDING RADIOLOGIST  This document has been electronically signed. Jun 8 2019  1:22PM      < end of copied text >    < from: 12 Lead ECG (06.08.19 @ 10:35) >    Ventricular Rate 101 BPM    Atrial Rate 101 BPM    P-R Interval 200 ms    QRS Duration 106 ms    Q-T Interval 370 ms    QTC Calculation(Bezet) 479 ms    P Axis 102 degrees    R Axis -24 degrees    T Axis 147 degrees    Diagnosis Line Sinus tachycardia  ST & T wave abnormality, consider lateral ischemia  Abnormal ECG  When compared with ECG of 14-MAR-2019 21:03,  Minimal criteria for Anteroseptal infarct are no longer present  ST now depressed in Inferior leads  ST now depressed in Lateral leads  T wave inversion now evident in Lateral leads  Confirmed by Toney GILBERT, Tru ([id number]) on 6/9/2019 10:24:38 AM    < end of copied text >

## 2019-06-17 NOTE — PROGRESS NOTE ADULT - ASSESSMENT
84f PAF on coumadin, HFPEF (most recent ef normal on a limited study, performed 4/2018),  CVA, clipped cerebral aneurysm HTN, HLD, mitral valve prolapse, PAD s/p bypass/stents, redo bypass, and a degree of residual non revascularizable disease, with associated sxs.  She does not clearly have a hx of cad.  She had nuclear stress testing in 2014.  She was found to have an inferolateral defect ultimately deemed artifact, though the description could conceivably have represented ischemia. presents w/ GI bleed w/ severe anemia w/ elevated lactate s/p PRBC x3 w/ vol overload    NSTEMI  - There was evidence of ischemia on EKG  - Cardiac enzymes have been elevated with troponins peaking at 25.6 and steadily trended down to 5.1  - Ischemia can on the basis of demand from severe anemia  - ASA has been restarted.  Monitor H/H closely.  Currently stable  - Continue Toprol 100 BID to help control her anginal symptoms.  - Increase statin as her LFT's have normalized  - TTE showed depressed EF 40-45% compared to her last TTE ion 2/2018 showing preserved LVF.  SWMA cannot be ruled out  - Monitor and replete electrolytes. Keep K>4.0 and Mg>2.0.   - She follows up with Dr. Laughlin as outpatient.  Possible ischemic eval can be discussed as outpatient    HTN  - Has been trending up overnight  - C/w ACE at low dose CR stable would increase lisinopril to 5mg daily monitor BP closely.   - pain management    GIB  - Presented with Hgb of 4.9, s/p PRBC x 3 units.    - Continue to trend H/H.   - sp EGD.  Found to have candida esophagitis, gastritis and bile reflux; no stigmata of recent bleeding.  Abx per GI  - GI following.  Outpatient follow up recommended    Afib  - She presented with therapeutic inr, was reversed with kcentra and vit k in the setting of bleeding  - She is currently in NSR  - Continue CCB and BB  - Cleared by GI to resume AC  Monitor and replete electrolytes. Keep K>4.0 and Mg>2.0.     HFrEF  - No evidence of volume overload  - TTE in Feb. 2018 showed preserved LVF with no significant valvular diseases.   - TTE now showing EF 40-45% but unable to r/o SWMA.  This change in LVF can be a result of her NSTEMI    Awaiting discharge to [facility] today.  Will follow up with Dr. Truman Quintero [facility]  Cardiology   Spectra #[phone]

## 2019-06-17 NOTE — PROGRESS NOTE ADULT - PROBLEM SELECTOR PLAN 2
see above, overall improved  multifactorial; suspect component of chronic abd pain r/t pvd as well  cont bowel regimen, hold for loose stools

## 2019-06-17 NOTE — DISCHARGE NOTE PROVIDER - CARE PROVIDER_API CALL
Yang Sauer (MD)  Gastroenterology  [address] [place] [address]  Phone: [phone]  Fax: [phone]  Follow Up Time:     Tahir Lockhart (DO)  [facility] Medicine  [address] [place] [address], [address]  [place] [address]  Phone: [phone]  Fax: [phone]  Follow Up Time:     Aristides Laughlin)  Internal Medicine  [address] [place] [address]  Phone: [phone]  Fax: [phone]  Follow Up Time:

## 2019-06-17 NOTE — DISCHARGE NOTE PROVIDER - CARE PROVIDERS DIRECT ADDRESSES
[email or web address]

## 2019-06-17 NOTE — PROGRESS NOTE ADULT - SUBJECTIVE AND OBJECTIVE BOX
INTERVAL HPI/OVERNIGHT EVENTS:  pt seen and examined  denies n/v  +bms  tolerated some po yesterday  c/o intermittent epigastric pains, but overall pain improved  no s/s active gib per rn    MEDICATIONS  (STANDING):  acetaminophen   Tablet .. 650 milliGRAM(s) Oral every 6 hours  ALBUTerol    0.083% 2.5 milliGRAM(s) Nebulizer every 8 hours  aspirin  chewable 81 milliGRAM(s) Oral daily  atorvastatin 20 milliGRAM(s) Oral at bedtime  chlorhexidine 2% Cloths 1 Application(s) Topical <User Schedule>  dicyclomine 10 milliGRAM(s) Oral three times a day before meals  diltiazem    milliGRAM(s) Oral daily  fluconAZOLE   Tablet 100 milliGRAM(s) Oral daily  lisinopril 2.5 milliGRAM(s) Oral daily  melatonin 5 milliGRAM(s) Oral at bedtime  metoprolol succinate  milliGRAM(s) Oral two times a day  pantoprazole    Tablet 40 milliGRAM(s) Oral every 12 hours    MEDICATIONS  (PRN):  aluminum hydroxide/magnesium hydroxide/simethicone Suspension 30 milliLiter(s) Oral every 4 hours PRN Dyspepsia  morphine  - Injectable 1 milliGRAM(s) IV Push every 6 hours PRN Severe Pain (7 - 10)  ondansetron Injectable 4 milliGRAM(s) IV Push once PRN Nausea and/or Vomiting      Allergies    No Known Allergies    Intolerances        Review of Systems:    General:  No wt loss, fevers, chills, night sweats, fatigue   Eyes:  Good vision, no reported pain  ENT:  No sore throat, pain, runny nose, dysphagia  CV:  No pain, palpitations, hypo/hypertension  Resp:  No dyspnea, cough, tachypnea, wheezing  GI:  mild pain, No nausea, No vomiting, No diarrhea, No constipation, No weight loss, No fever, No pruritis, No rectal bleeding, No melena, No dysphagia  :  No pain, bleeding, incontinence, nocturia  Muscle:  No pain, weakness  Neuro:  No weakness, tingling, memory problems  Psych:  No fatigue, insomnia, mood problems, depression  Endocrine:  No polyuria, polydypsia, cold/heat intolerance  Heme:  No petechiae, ecchymosis, easy bruisability  Skin:  No rash, tattoos, scars, edema      Vital Signs Last 24 Hrs  T(C): 36.6 (17 Jun 2019 07:25), Max: 37 (16 Jun 2019 12:27)  T(F): 97.9 (17 Jun 2019 07:25), Max: 98.6 (16 Jun 2019 12:27)  HR: 71 (17 Jun 2019 07:59) (67 - 78)  BP: 166/79 (17 Jun 2019 07:25) (137/74 - 166/79)  BP(mean): --  RR: 14 (17 Jun 2019 07:25) (14 - 19)  SpO2: 99% (17 Jun 2019 07:59) (98% - 100%)    PHYSICAL EXAM:    Constitutional: lying in bed  HEENT: ncat  Neck: No LAD  Respiratory:  dec bs  Cardiovascular: S1 and S2, RRR  Gastrointestinal: soft mild epigastric ttp no guarding mild dt  Extremities: No edema  Vascular: 2+ peripheral pulses  Neurological: Awake alert responds appropriately  Skin: No rashes    LABS:                        10.6   8.96  )-----------( 197      ( 17 Jun 2019 06:12 )             33.8     06-17    144  |  108  |  22  ----------------------------<  99  4.1   |  31  |  0.99    Ca    8.3<L>      17 Jun 2019 06:12      PT/INR - ( 17 Jun 2019 06:12 )   PT: 22.9 sec;   INR: 1.98 ratio               RADIOLOGY & ADDITIONAL TESTS:

## 2019-06-17 NOTE — PROGRESS NOTE ADULT - PROBLEM SELECTOR PLAN 1
resolved  cta a/p neg for acute gi pathology  sp egd- findings of candida esophagitis, gastritis and bile reflux; no stigmata of recent bleeding  cont diflucan x 5 days  cont ppi bid, anti emetics prn, maalox prn for indigestion, trial of bentyl  monitor cbc, transfuse prn  diet as tolerated  monitor exam, prn pain control  no gi objection to asa/ac  will need op capsule

## 2019-06-17 NOTE — DISCHARGE NOTE NURSING/CASE MANAGEMENT/SOCIAL WORK - NSDCDPATPORTLINK_GEN_ALL_CORE
You can access the [facility] Patient Portal, offered by [facility], by registering with the following website: [email or web address]

## 2019-06-17 NOTE — DISCHARGE NOTE PROVIDER - PROVIDER TOKENS
PROVIDER:[TOKEN:[[id number]]],PROVIDER:[TOKEN:[[id number]]],PROVIDER:[TOKEN:[[id number]]]

## 2019-06-17 NOTE — PROGRESS NOTE ADULT - PROVIDER SPECIALTY LIST ADULT
Anesthesia
Cardiology
Critical Care
Gastroenterology
Hospitalist
Infectious Disease
Pulmonology
Gastroenterology
Hospitalist

## 2019-06-17 NOTE — DISCHARGE NOTE PROVIDER - NSDCCPCAREPLAN_GEN_ALL_CORE_FT
PRINCIPAL DISCHARGE DIAGNOSIS  Diagnosis: GIB (gastrointestinal bleeding)  Assessment and Plan of Treatment: You had a self limited gastrointestinal bleed. EGD showed no evidence of acute GIB although shows esophageal candidiasis. Fluconazole was started. GI recommends outpatient occult bleeding capsule work up. Please follow up with Dr. Sauer within one week of discharge. If you develop any signs of bleeding like coffee ground emesis, black stools, over bleeding or any other worrisome symptom please visit ED for further evaluation.      SECONDARY DISCHARGE DIAGNOSES  Diagnosis: Esophageal candidiasis  Assessment and Plan of Treatment: On EGD you were found to have esophageal candidiasis and started on a 5 day course of fluconazole. Last dose today.    Diagnosis: Constipation  Assessment and Plan of Treatment: Your hospital course was complicated by abdominal pain secondary to constipation. You were treated with laxatives which resolved your constipation. If you do develop further constipation you may take over the counter stool softners such as colace. You were started on an antismaspodic called Bentyl for abdominal discomfort.  Please visit your primary care physician, Dr. Lockhart within one week of discharge.    Diagnosis: NSTEMI (non-ST elevated myocardial infarction)  Assessment and Plan of Treatment: You had a non-ST elevated myocardial infarction likely secondary to blood loss. Please continue baby aspirin, atorvastatin and toprol. Your toprol dose was increased. Your TTE showed LVEF of 45-50% with moderate mitral regugitation. Please follow up with cardiologist outpatient.       Diagnosis: Afib  Assessment and Plan of Treatment: Please continue toprol, diltiazem and coumadin. Please follow up outpatient with cardiologist within one week of discharge.    Diagnosis: Hypertension  Assessment and Plan of Treatment: Please continue lisinopril and toprol.    Diagnosis: CVA (cerebral vascular accident)  Assessment and Plan of Treatment: Please continue atrovastatin and baby aspirin. PRINCIPAL DISCHARGE DIAGNOSIS  Diagnosis: GIB (gastrointestinal bleeding)  Assessment and Plan of Treatment: You had a self limited gastrointestinal bleed. EGD showed no evidence of acute GIB although shows esophageal candidiasis. Fluconazole was started, recommended for 5 days. GI recommends outpatient occult bleeding capsule work up. Please follow up with Dr. Sauer within one week of discharge. If you develop any signs of bleeding like coffee ground emesis, black stools, over bleeding or any other worrisome symptom please visit ED for further evaluation.      SECONDARY DISCHARGE DIAGNOSES  Diagnosis: Esophageal candidiasis  Assessment and Plan of Treatment: On EGD you were found to have esophageal candidiasis and started on a 5 day course of fluconazole. Last dose 6/18/19.    Diagnosis: Constipation  Assessment and Plan of Treatment: Your hospital course was complicated by abdominal pain secondary to constipation. You were treated with laxatives which resolved your constipation. If you do develop further constipation you may take over the counter stool softners such as colace. You were started on an antismaspodic called Bentyl for abdominal discomfort.  Please visit your primary care physician, Dr. Lockhart within one week of discharge.    Diagnosis: CVA (cerebral vascular accident)  Assessment and Plan of Treatment: Please continue atrovastatin and baby aspirin.    Diagnosis: Hypertension  Assessment and Plan of Treatment: Please continue lisinopril and toprol.    Diagnosis: Afib  Assessment and Plan of Treatment: Please continue toprol, diltiazem and coumadin. Please follow up outpatient with cardiologist within one week of discharge.    Diagnosis: NSTEMI (non-ST elevated myocardial infarction)  Assessment and Plan of Treatment: You had a non-ST elevated myocardial infarction likely secondary to blood loss. Please continue baby aspirin, atorvastatin and toprol. Your toprol dose was increased. Your TTE showed LVEF of 45-50% with moderate mitral regugitation. Please follow up with cardiologist outpatient.

## 2019-07-01 ENCOUNTER — OUTPATIENT (OUTPATIENT)
Dept: OUTPATIENT SERVICES | Facility: HOSPITAL | Age: 84
LOS: 1 days | End: 2019-07-01
Payer: MEDICARE

## 2019-07-01 DIAGNOSIS — Z98.89 OTHER SPECIFIED POSTPROCEDURAL STATES: Chronic | ICD-10-CM

## 2019-07-01 DIAGNOSIS — Z98.49 CATARACT EXTRACTION STATUS, UNSPECIFIED EYE: Chronic | ICD-10-CM

## 2019-07-01 DIAGNOSIS — Z41.9 ENCOUNTER FOR PROCEDURE FOR PURPOSES OTHER THAN REMEDYING HEALTH STATE, UNSPECIFIED: Chronic | ICD-10-CM

## 2019-07-01 DIAGNOSIS — N20.0 CALCULUS OF KIDNEY: Chronic | ICD-10-CM

## 2019-07-01 DIAGNOSIS — Z96.7 PRESENCE OF OTHER BONE AND TENDON IMPLANTS: Chronic | ICD-10-CM

## 2019-07-01 PROCEDURE — G9001: CPT

## 2019-07-08 DIAGNOSIS — Z71.89 OTHER SPECIFIED COUNSELING: ICD-10-CM

## 2019-07-25 ENCOUNTER — NON-APPOINTMENT (OUTPATIENT)
Age: 84
End: 2019-07-25

## 2019-07-25 ENCOUNTER — APPOINTMENT (OUTPATIENT)
Dept: CARDIOLOGY | Facility: CLINIC | Age: 84
End: 2019-07-25
Payer: MEDICARE

## 2019-07-25 VITALS
HEART RATE: 77 BPM | OXYGEN SATURATION: 96 % | BODY MASS INDEX: 19.45 KG/M2 | WEIGHT: 103 LBS | DIASTOLIC BLOOD PRESSURE: 71 MMHG | HEIGHT: 61 IN | SYSTOLIC BLOOD PRESSURE: 126 MMHG

## 2019-07-25 DIAGNOSIS — I34.0 NONRHEUMATIC MITRAL (VALVE) INSUFFICIENCY: ICD-10-CM

## 2019-07-25 DIAGNOSIS — I73.9 PERIPHERAL VASCULAR DISEASE, UNSPECIFIED: ICD-10-CM

## 2019-07-25 DIAGNOSIS — I10 ESSENTIAL (PRIMARY) HYPERTENSION: ICD-10-CM

## 2019-07-25 LAB
INR PPP: 3.6 RATIO
QUALITY CONTROL: YES

## 2019-07-25 PROCEDURE — 93000 ELECTROCARDIOGRAM COMPLETE: CPT

## 2019-07-25 PROCEDURE — 85610 PROTHROMBIN TIME: CPT | Mod: QW

## 2019-07-25 PROCEDURE — 99214 OFFICE O/P EST MOD 30 MIN: CPT

## 2019-07-25 NOTE — DISCUSSION/SUMMARY
[FreeTextEntry1] : 83-year-old woman with a history as listed above who presents today for a followup cardiac evaluation.\par Celia is doing well overall.  She appears currently stable from a cardiovascular standpoint. She denies any anginal symptoms. Clinically she is euvolemic and not in heart failure. Her EKG shows that she is maintaining sinus rhythm. Her EKG did not reveal any significant ischemic changes. \par \par Her blood pressure is  controlled.  I will continue her Hydralazine 50mg q8. She will continue with  Diltiazem 120mg Qday and Toprol 100mg HS. \par \par Her PAD is stable. Though she is now having rest pain and leg fatigue which is likely a function of her activities increasing in the setting of her severe PAD. Her wounds have healed.  If she gets any further gangrenous issues on the right foot she may be forced to have an AKA in the future. She has virtually no targets for another bypass. Followup with Dr. Araya. I have encouraged more walking. Unfortunately more antiplatelet therapy is not advised given increased risk of bleeding. She will continue with aspirin 81 mg daily.\par \par She will continue with Coumadin for stroke risk reduction. She will monitor for signs of bleeding.  We'll try to keep her INR between 2-3. She will monitor for signs of recurrent GIB. SHe will continue to see GI. She will need her INR checked today. \par \par  She will continue Lipitor 40 mg daily. Her goal LDL is <70. \par \par  Her last echo in the hospital did not show any severe valvular disease. \par \par She will followup with me in 3-4 month  time. She will followup with you for further medical needs.

## 2019-07-25 NOTE — HISTORY OF PRESENT ILLNESS
[FreeTextEntry1] : 84 year-old woman with a history of hypercholesterolemia, hypertension, rheumatoid arthritis, cerebral aneurysm status post clipping, PAD status post femoropopliteal bypass with subsequent right leg femoropopliteal bypass thrombosis and infected wound, redo bypass with failure, then PCI with failure, paroxysmal atrial fibrillation previously on AC with Eliquis which was converted to Coumadin after an episode of UGIB. She had admission to [facility] where she had significant abdominal pain requiring a emergent laparotomy and reduction of closed loop  with short segment of SB excised. her course was complicated by rapid atrial fibrillation and mild heart failure. He was also complicated by bleeding from the anastomosis site in the setting of an increased INR. She was stabilized and then subsequently sent to rehabilitation. Then she had a fall with a hip fracture from a mechanical fall s/p ORIF. She is now walking with a walker.\par In 11/2017 she was hospitalized for a gangrenous right toe with possible signs of osteomyelitis (no MR 2/2 brain clippings). He had a long and complicated hospital coarse. Podiatry recommended a amputation. An IR angiogram showed multilevel occlusions involving the right common femoral artery, the \par superficial femoral artery and popliteal artery with collateral vessels feeding single vessel below knee. Another bypass was considered to high risk and likely to have poor outcomes. She was offered an AKA but refused. She eneded up getting an amputation of her toe. Her course was complicated by anemia and also multiple episodes of AF with RVR. \par She was readmitted to  for for significant diarrhea leading to shock and respiratory failure. She was significantly bradycardic requiring a TVP. Once her metabolic derangement improved she returned to SR with intermittent PAF.   \par \par  she was admitted to  in summer on 2018 for a prolong stay for  a UTI and infected gallstone requiring an ERCP. \par she was admitted to  in 1/2019 for colitis. she was treated with antibiotics and her symptoms resolved. \par \par She now presents for a followup visit. \par Since her last visit, she was admitted at  in 6/2019 for anemia from GIB (Hb ~5).  She had an egd that was shown to have esophageal candidiasis. Her Coumadin was resumed. She went to rehab for 1 week. \par \par She is now here for a followup visit. \par She denies any more abdominal pain. \par She has been able to ambulate with the walker. She is not as fatigued. She is able to do her ADLS.  No claudication symptoms.  \par  She denies any blurry vision, headaches or recent stroke like symptoms. \par She   denies any chest pain, dyspnea, PND, orthopnea,   syncope, strokelike symptoms. Her lower extremity has resolved. She intermittently gets lightheaded standing. \par She is compliant with her medications. \par No reported melena, hematochezia or hematemesis.  \par  \par

## 2019-07-25 NOTE — REVIEW OF SYSTEMS
[Headache] : no headache [Shortness Of Breath] : no shortness of breath [Dyspnea on exertion] : not dyspnea during exertion [Chest  Pressure] : no chest pressure [Chest Pain] : no chest pain [Lower Ext Edema] : no extremity edema [Leg Claudication] : no intermittent leg claudication [Palpitations] : no palpitations [Change In The Stool] : no change in stool [Joint Pain] : joint pain [Joint Stiffness] : joint stiffness [see HPI] : see HPI [Negative] : Heme/Lymph

## 2019-08-07 LAB
INR PPP: 2.94 RATIO
PT BLD: 34.7 SEC

## 2019-09-25 NOTE — ED ADULT NURSE NOTE - BREATH SOUNDS, MLM
Called patient to schedule speech/memory evaluation  Left message for patient to call back for appointment 
Clear

## 2019-09-27 ENCOUNTER — CLINICAL ADVICE (OUTPATIENT)
Age: 84
End: 2019-09-27

## 2019-10-22 ENCOUNTER — OTHER (OUTPATIENT)
Age: 84
End: 2019-10-22

## 2019-10-22 LAB
INR PPP: 1.35 RATIO
PT BLD: 15.5 SEC

## 2019-10-29 LAB
INR PPP: 2.55 RATIO
PT BLD: 29.7 SEC

## 2019-11-01 ENCOUNTER — NON-APPOINTMENT (OUTPATIENT)
Age: 84
End: 2019-11-01

## 2019-11-01 ENCOUNTER — APPOINTMENT (OUTPATIENT)
Dept: CARDIOLOGY | Facility: CLINIC | Age: 84
End: 2019-11-01
Payer: MEDICARE

## 2019-11-01 VITALS
OXYGEN SATURATION: 96 % | SYSTOLIC BLOOD PRESSURE: 144 MMHG | DIASTOLIC BLOOD PRESSURE: 85 MMHG | BODY MASS INDEX: 19.07 KG/M2 | HEART RATE: 101 BPM | HEIGHT: 61 IN | WEIGHT: 101 LBS

## 2019-11-01 VITALS — SYSTOLIC BLOOD PRESSURE: 124 MMHG | DIASTOLIC BLOOD PRESSURE: 78 MMHG

## 2019-11-01 PROCEDURE — 93000 ELECTROCARDIOGRAM COMPLETE: CPT

## 2019-11-01 PROCEDURE — 99214 OFFICE O/P EST MOD 30 MIN: CPT

## 2019-11-01 NOTE — DISCUSSION/SUMMARY
[FreeTextEntry1] : 84-year-old woman with a history as listed above who presents today for a followup cardiac evaluation.\par Celia is complaining of nonspecific symptoms. I think her sinus tachycardia maybe playing a role in her "vibration" symptoms. She will reduce her stimulant intake (ie soda). Monitor for signs of an infection.  \par Increase her hydration. \par  \par She denies any anginal symptoms.  Her EKG shows that she is maintaining sinus rhythm. Her EKG did not reveal any significant ischemic changes. \par \par Her blood pressure is  controlled.  I will decrease her Hydralazine to 25mg q8. She will continue with  Diltiazem 120mg Qday. I will increase her Toprol to 150mg Qday. Hopefully this will help some of her reactive tachycardia event if she is having paroxysm of AF. \par \par Her PAD is stable. Though she is now having rest pain and leg fatigue which is likely a function of her activities increasing in the setting of her severe PAD. Her wounds have healed.  If she gets any further gangrenous issues on the right foot she may be forced to have an AKA in the future. She has virtually no targets for another bypass. Followup with Dr. Araya. I have encouraged more walking. Unfortunately more antiplatelet therapy is not advised given increased risk of bleeding. She will continue with aspirin 81 mg daily.\par \par She will continue with Coumadin for stroke risk reduction. She will monitor for signs of bleeding.  We'll try to keep her INR between 2-3. She will monitor for signs of recurrent GIB. SHe will continue to see GI. She will need her INR checked today. \par \par  She will continue Lipitor 40 mg daily. Her goal LDL is <70. \par \par  Her last echo in the hospital did not show any severe valvular disease. \par \par At your convenience, please fax me her latest lab results including lipid profile. \par She will followup with me in 3-4 month  time. She will followup with you for further medical needs.

## 2019-11-01 NOTE — PHYSICAL EXAM
[Normal Appearance] : normal appearance [General Appearance - In No Acute Distress] : no acute distress [Normal Conjunctiva] : the conjunctiva exhibited no abnormalities [No Oral Pallor] : no oral pallor [Normal Jugular Venous V Waves Present] : normal jugular venous V waves present [Respiration, Rhythm And Depth] : normal respiratory rhythm and effort [Exaggerated Use Of Accessory Muscles For Inspiration] : no accessory muscle use [Auscultation Breath Sounds / Voice Sounds] : lungs were clear to auscultation bilaterally [Bowel Sounds] : normal bowel sounds [Abdomen Soft] : soft [Abdomen Tenderness] : non-tender [] : no rash [Skin Lesions] : no skin lesions [Oriented To Time, Place, And Person] : oriented to person, place, and time [Affect] : the affect was normal [Mood] : the mood was normal [Normal Rate] : normal [Rhythm Regular] : regular [Normal S1] : normal S1 [Normal S2] : normal S2 [No Gallop] : no gallop heard [I] : a grade 1 [0] : right 0 [1+] : left 1+ [___ +] : bilateral [unfilled]U+ pitting edema to the ankles [Rt] : varicose veins of the right leg noted [Lt] : varicose veins of the left leg noted [FreeTextEntry1] : swan neck deformities in hands [Right Carotid Bruit] : no bruit heard over the right carotid [Left Carotid Bruit] : no bruit heard over the left carotid

## 2019-11-01 NOTE — REVIEW OF SYSTEMS
[Joint Pain] : joint pain [Joint Stiffness] : joint stiffness [see HPI] : see HPI [Negative] : Heme/Lymph [Headache] : no headache [Shortness Of Breath] : no shortness of breath [Dyspnea on exertion] : not dyspnea during exertion [Chest  Pressure] : no chest pressure [Chest Pain] : no chest pain [Lower Ext Edema] : no extremity edema [Leg Claudication] : no intermittent leg claudication [Palpitations] : no palpitations [Change In The Stool] : no change in stool

## 2019-11-01 NOTE — CARDIOLOGY SUMMARY
[No Ischemia] : no Ischemia [___] : [unfilled] [___] : [unfilled] [None] : normal LV function [Mild] : mild mitral regurgitation

## 2019-11-01 NOTE — HISTORY OF PRESENT ILLNESS
[FreeTextEntry1] : 84 year-old woman with a history of hypercholesterolemia, hypertension, rheumatoid arthritis, cerebral aneurysm status post clipping, PAD status post femoropopliteal bypass with subsequent right leg femoropopliteal bypass thrombosis and infected wound, redo bypass with failure, then PCI with failure, paroxysmal atrial fibrillation previously on AC with Eliquis which was converted to Coumadin after an episode of UGIB. She had admission to [facility] where she had significant abdominal pain requiring a emergent laparotomy and reduction of closed loop  with short segment of SB excised. her course was complicated by rapid atrial fibrillation and mild heart failure. He was also complicated by bleeding from the anastomosis site in the setting of an increased INR. She was stabilized and then subsequently sent to rehabilitation. Then she had a fall with a hip fracture from a mechanical fall s/p ORIF. She is now walking with a walker.\par In 11/2017 she was hospitalized for a gangrenous right toe with possible signs of osteomyelitis (no MR 2/2 brain clippings). He had a long and complicated hospital coarse. Podiatry recommended a amputation. An IR angiogram showed multilevel occlusions involving the right common femoral artery, the \par superficial femoral artery and popliteal artery with collateral vessels feeding single vessel below knee. Another bypass was considered to high risk and likely to have poor outcomes. She was offered an AKA but refused. She eneded up getting an amputation of her toe. Her course was complicated by anemia and also multiple episodes of AF with RVR. \par She was readmitted to  for for significant diarrhea leading to shock and respiratory failure. She was significantly bradycardic requiring a TVP. Once her metabolic derangement improved she returned to SR with intermittent PAF.   \par \par  she was admitted to  in summer on 2018 for a prolong stay for  a UTI and infected gallstone requiring an ERCP. \par she was admitted to  in 1/2019 for colitis. she was treated with antibiotics and her symptoms resolved. \par she was admitted at  in 6/2019 for anemia from GIB (Hb ~5).  She had an egd that was shown to have esophageal candidiasis. Her Coumadin was resumed. She went to rehab for 1 week. \par \par She now presents for a followup visit. \par She has been complaining of more fatigue. She is complaining of intermittent chills and like her body is vibrating for the last 2 days. She notes that she has not been eating as much. She has been drinking more Pepsi.  Her HR was noted to be about 110 when feeling. She not eating much. Her SBP has been . She denies any syncope. \par  \par  She denies any blurry vision, headaches or recent stroke like symptoms. \par She   denies any chest pain, dyspnea, PND, orthopnea , strokelike symptoms. Her lower extremity has resolved.   She is compliant with her medications. No reported melena, hematochezia or hematemesis.  \par  \par

## 2019-11-05 LAB
INR PPP: 3.9 RATIO
PT BLD: 46 SEC

## 2019-11-06 VITALS — HEART RATE: 101 BPM | BODY MASS INDEX: 19.07 KG/M2 | WEIGHT: 101 LBS | HEIGHT: 61 IN

## 2019-11-20 LAB
INR PPP: 1.71 RATIO
PT BLD: 19.6 SEC

## 2019-11-24 LAB
INR PPP: 1.39 RATIO
PT BLD: 16.1 SEC

## 2019-12-10 ENCOUNTER — LABORATORY RESULT (OUTPATIENT)
Age: 84
End: 2019-12-10

## 2019-12-26 ENCOUNTER — INPATIENT (INPATIENT)
Facility: HOSPITAL | Age: 84
LOS: 8 days | Discharge: HOME | DRG: 177 | End: 2020-01-04
Attending: INTERNAL MEDICINE | Admitting: INTERNAL MEDICINE
Payer: MEDICARE

## 2019-12-26 VITALS
RESPIRATION RATE: 18 BRPM | HEART RATE: 95 BPM | DIASTOLIC BLOOD PRESSURE: 78 MMHG | WEIGHT: 123.9 LBS | SYSTOLIC BLOOD PRESSURE: 124 MMHG | TEMPERATURE: 98 F

## 2019-12-26 DIAGNOSIS — Z41.9 ENCOUNTER FOR PROCEDURE FOR PURPOSES OTHER THAN REMEDYING HEALTH STATE, UNSPECIFIED: Chronic | ICD-10-CM

## 2019-12-26 DIAGNOSIS — Z98.89 OTHER SPECIFIED POSTPROCEDURAL STATES: Chronic | ICD-10-CM

## 2019-12-26 DIAGNOSIS — Z96.7 PRESENCE OF OTHER BONE AND TENDON IMPLANTS: Chronic | ICD-10-CM

## 2019-12-26 DIAGNOSIS — N20.0 CALCULUS OF KIDNEY: Chronic | ICD-10-CM

## 2019-12-26 DIAGNOSIS — Z98.49 CATARACT EXTRACTION STATUS, UNSPECIFIED EYE: Chronic | ICD-10-CM

## 2019-12-26 LAB
APTT BLD: 36.1 SEC — SIGNIFICANT CHANGE UP (ref 28.5–37)
HCT VFR BLD CALC: 25.5 % — LOW (ref 34.5–45)
HGB BLD-MCNC: 8.1 G/DL — LOW (ref 11.5–15.5)
INR BLD: 1.79 RATIO — HIGH (ref 0.88–1.16)
MCHC RBC-ENTMCNC: 27.6 PG — SIGNIFICANT CHANGE UP (ref 27–34)
MCHC RBC-ENTMCNC: 31.8 GM/DL — LOW (ref 32–36)
MCV RBC AUTO: 86.7 FL — SIGNIFICANT CHANGE UP (ref 80–100)
NRBC # BLD: 0 /100 WBCS — SIGNIFICANT CHANGE UP (ref 0–0)
PLATELET # BLD AUTO: 126 K/UL — LOW (ref 150–400)
PROTHROM AB SERPL-ACNC: 20.6 SEC — HIGH (ref 10–12.9)
RBC # BLD: 2.94 M/UL — LOW (ref 3.8–5.2)
RBC # FLD: 15.9 % — HIGH (ref 10.3–14.5)
WBC # BLD: 13.15 K/UL — HIGH (ref 3.8–10.5)
WBC # FLD AUTO: 13.15 K/UL — HIGH (ref 3.8–10.5)

## 2019-12-26 NOTE — ED PROVIDER NOTE - NS ED MD DISPO ISOLATION TYPES
CHIEF COMPLAINT  Eye Problem    Samantha GALE is a 46 year old female who presents for problem exam. Patient was most recently seen for corneal ulcer left eye, healed well. Now here with similar eye pain, light sensitivity and redness in the right eye. Was wearing contacts for a long drive home from [place] and had the same symptoms start around 2:00 AM. Took CLs out and now has a white dot on the right cornea.    HISTORY OF PRESENT ILLNESS  I have reviewed and appreciated the technician's history and test results as outlined above.    VISION  Visual Acuity (Snellen - Linear)       Right Left    Dist cc 20/20-1 20/20-1    Correction:  Glasses        PHYSICAL EXAM  Main Ophthalmology Exam     External Exam       Right Left    External Normal Normal          Slit Lamp Exam       Right Left    Lids/Lashes Normal Normal    Conjunctiva/Sclera 2+ injection Clear    Cornea Marginal ulcer 6:00 old infiltrate scar    Anterior Chamber Deep and quiet Deep and quiet    Iris Round and regular Round and regular    Lens Clear Clear    Vitreous Clear Clear                ASSESSMENT   1. Corneal ulcer of right eye        PLAN   1. Now ulcer with infiltrate in right eye. D/C all monthly CL wear from here on out and only daily lens should be considered. Start 1 gtt Ciprofloxacin every 3 hours OD x 3 days, 1 gtt Cyclo qAM for comfort. RTC on Thursday for check    Discussed exam findings with patient.  Patient reassured and has no further questions.           
None

## 2019-12-26 NOTE — ED PROVIDER NOTE - OBJECTIVE STATEMENT
85 yo white female on coumadin here now for evaluation of nose bleed that occurred earlier today but that is no longer present or bleeding. No trauma. No nausea or vomiting. No URI symptoms.

## 2019-12-26 NOTE — ED ADULT NURSE REASSESSMENT NOTE - NS ED NURSE REASSESS COMMENT FT1
Received report from Gracie GARCIA. Pt is sleeping in bed. awaiting Sw in A.M. No fresh complaints. Vitals stable. Will ctm.

## 2019-12-26 NOTE — ED ADULT NURSE NOTE - OBJECTIVE STATEMENT
Pt reports having epistaxis last night for a few hours, and reports that she is on coumadin, and now has weakness. She has had no epistaxis since.

## 2019-12-26 NOTE — ED PROVIDER NOTE - PROGRESS NOTE DETAILS
evaluated by PT states not cleared, but can go outpatient PT, discussed case with Dr. Fry recommend hydration and tylenol and PT will reassess discussed case with Dr. Justin Coyne will admit, PT re evaluated, unable to ambulate recommend rehab

## 2019-12-26 NOTE — ED PROVIDER NOTE - CARE PLAN
Principal Discharge DX:	Epistaxis Principal Discharge DX:	Epistaxis  Secondary Diagnosis:	Difficulty walking

## 2019-12-27 DIAGNOSIS — D50.0 IRON DEFICIENCY ANEMIA SECONDARY TO BLOOD LOSS (CHRONIC): ICD-10-CM

## 2019-12-27 DIAGNOSIS — R04.0 EPISTAXIS: ICD-10-CM

## 2019-12-27 DIAGNOSIS — I63.9 CEREBRAL INFARCTION, UNSPECIFIED: ICD-10-CM

## 2019-12-27 DIAGNOSIS — I10 ESSENTIAL (PRIMARY) HYPERTENSION: ICD-10-CM

## 2019-12-27 DIAGNOSIS — I48.91 UNSPECIFIED ATRIAL FIBRILLATION: ICD-10-CM

## 2019-12-27 DIAGNOSIS — R26.2 DIFFICULTY IN WALKING, NOT ELSEWHERE CLASSIFIED: ICD-10-CM

## 2019-12-27 DIAGNOSIS — Z29.9 ENCOUNTER FOR PROPHYLACTIC MEASURES, UNSPECIFIED: ICD-10-CM

## 2019-12-27 DIAGNOSIS — E78.5 HYPERLIPIDEMIA, UNSPECIFIED: ICD-10-CM

## 2019-12-27 LAB
ALBUMIN SERPL ELPH-MCNC: 2.9 G/DL — LOW (ref 3.3–5)
ALP SERPL-CCNC: 57 U/L — SIGNIFICANT CHANGE UP (ref 40–120)
ALT FLD-CCNC: 14 U/L — SIGNIFICANT CHANGE UP (ref 12–78)
ANION GAP SERPL CALC-SCNC: 5 MMOL/L — SIGNIFICANT CHANGE UP (ref 5–17)
AST SERPL-CCNC: 19 U/L — SIGNIFICANT CHANGE UP (ref 15–37)
BILIRUB SERPL-MCNC: 0.9 MG/DL — SIGNIFICANT CHANGE UP (ref 0.2–1.2)
BUN SERPL-MCNC: 50 MG/DL — HIGH (ref 7–23)
CALCIUM SERPL-MCNC: 8.4 MG/DL — LOW (ref 8.5–10.1)
CHLORIDE SERPL-SCNC: 114 MMOL/L — HIGH (ref 96–108)
CO2 SERPL-SCNC: 27 MMOL/L — SIGNIFICANT CHANGE UP (ref 22–31)
CREAT SERPL-MCNC: 1.3 MG/DL — SIGNIFICANT CHANGE UP (ref 0.5–1.3)
GLUCOSE SERPL-MCNC: 111 MG/DL — HIGH (ref 70–99)
POTASSIUM SERPL-MCNC: 3.7 MMOL/L — SIGNIFICANT CHANGE UP (ref 3.5–5.3)
POTASSIUM SERPL-SCNC: 3.7 MMOL/L — SIGNIFICANT CHANGE UP (ref 3.5–5.3)
PROT SERPL-MCNC: 6.3 G/DL — SIGNIFICANT CHANGE UP (ref 6–8.3)
SODIUM SERPL-SCNC: 146 MMOL/L — HIGH (ref 135–145)

## 2019-12-27 PROCEDURE — 93010 ELECTROCARDIOGRAM REPORT: CPT

## 2019-12-27 PROCEDURE — 99223 1ST HOSP IP/OBS HIGH 75: CPT | Mod: GC

## 2019-12-27 PROCEDURE — 71045 X-RAY EXAM CHEST 1 VIEW: CPT | Mod: 26

## 2019-12-27 PROCEDURE — 99283 EMERGENCY DEPT VISIT LOW MDM: CPT

## 2019-12-27 RX ORDER — METOPROLOL TARTRATE 50 MG
150 TABLET ORAL DAILY
Refills: 0 | Status: DISCONTINUED | OUTPATIENT
Start: 2019-12-27 | End: 2020-01-04

## 2019-12-27 RX ORDER — LANOLIN ALCOHOL/MO/W.PET/CERES
5 CREAM (GRAM) TOPICAL AT BEDTIME
Refills: 0 | Status: DISCONTINUED | OUTPATIENT
Start: 2019-12-27 | End: 2020-01-04

## 2019-12-27 RX ORDER — DILTIAZEM HCL 120 MG
120 CAPSULE, EXT RELEASE 24 HR ORAL DAILY
Refills: 0 | Status: DISCONTINUED | OUTPATIENT
Start: 2019-12-27 | End: 2019-12-30

## 2019-12-27 RX ORDER — PANTOPRAZOLE SODIUM 20 MG/1
40 TABLET, DELAYED RELEASE ORAL
Refills: 0 | Status: DISCONTINUED | OUTPATIENT
Start: 2019-12-27 | End: 2019-12-29

## 2019-12-27 RX ORDER — ACETAMINOPHEN 500 MG
650 TABLET ORAL ONCE
Refills: 0 | Status: COMPLETED | OUTPATIENT
Start: 2019-12-27 | End: 2019-12-27

## 2019-12-27 RX ORDER — HYDRALAZINE HCL 50 MG
25 TABLET ORAL THREE TIMES A DAY
Refills: 0 | Status: DISCONTINUED | OUTPATIENT
Start: 2019-12-27 | End: 2019-12-28

## 2019-12-27 RX ORDER — SENNA PLUS 8.6 MG/1
2 TABLET ORAL AT BEDTIME
Refills: 0 | Status: DISCONTINUED | OUTPATIENT
Start: 2019-12-27 | End: 2020-01-04

## 2019-12-27 RX ORDER — ATORVASTATIN CALCIUM 80 MG/1
40 TABLET, FILM COATED ORAL AT BEDTIME
Refills: 0 | Status: DISCONTINUED | OUTPATIENT
Start: 2019-12-27 | End: 2020-01-04

## 2019-12-27 RX ORDER — ASPIRIN/CALCIUM CARB/MAGNESIUM 324 MG
81 TABLET ORAL DAILY
Refills: 0 | Status: DISCONTINUED | OUTPATIENT
Start: 2019-12-27 | End: 2020-01-04

## 2019-12-27 RX ORDER — HYDRALAZINE HCL 50 MG
1 TABLET ORAL
Qty: 0 | Refills: 0 | DISCHARGE

## 2019-12-27 RX ADMIN — Medication 5 MILLIGRAM(S): at 21:23

## 2019-12-27 RX ADMIN — SENNA PLUS 2 TABLET(S): 8.6 TABLET ORAL at 21:21

## 2019-12-27 RX ADMIN — Medication 650 MILLIGRAM(S): at 09:57

## 2019-12-27 RX ADMIN — Medication 25 MILLIGRAM(S): at 21:21

## 2019-12-27 RX ADMIN — ATORVASTATIN CALCIUM 40 MILLIGRAM(S): 80 TABLET, FILM COATED ORAL at 21:21

## 2019-12-27 NOTE — H&P ADULT - NSHPREVIEWOFSYSTEMS_GEN_ALL_CORE
REVIEW OF SYSTEMS  Constitutional:  No fever, chills, admits to fatigue, weakness  Neuro: No headache, numbness, weakness  ENT: admits to epistaxis (reslved)  Resp:  No Cough, no wheezing; dyspnea on exertion   CVS: No chest pain, palpitations, leg swelling   GI: No abdominal pain, nausea, vomiting, diarrhea   : No dysuria, frequency, incontinence  Skin: No itching, burning, rashes, or lesions   Msk: Admits to LE weakness, no joint pain

## 2019-12-27 NOTE — PHYSICAL THERAPY INITIAL EVALUATION ADULT - PERTINENT HX OF CURRENT PROBLEM, REHAB EVAL
85 yo white female on coumadin here now for evaluation of nose bleed that occurred earlier today but that is no longer present or bleeding. No trauma. No nausea or vomiting

## 2019-12-27 NOTE — H&P ADULT - PROBLEM SELECTOR PLAN 8
IMPROVE VTE Individual Risk Assessment          RISK                                                          Points  [  ] Previous VTE                                                3  [  ] Thrombophilia                                            2  [  ] Lower limb paralysis                                  2        (unable to hold up >15 seconds)    [  ] Current Cancer                                            2         (within 6 months)  [  ] Immobilization > 24 hrs                             1  [  ] ICU/CCU stay > 24 hours                           1  [ x ] Age > 60                                                        1    IMPROVE VTE Score: 1  AC with coumadin

## 2019-12-27 NOTE — ED ADULT NURSE REASSESSMENT NOTE - NS ED NURSE REASSESS COMMENT FT1
Pt is resting in bed. Needs met and pt cleaned. Vitals stable. No fresh complaints. Will ctm. Awaiting SW for Rehab.

## 2019-12-27 NOTE — H&P ADULT - NSHPPHYSICALEXAM_GEN_ALL_CORE
Vital Signs Last 24 Hrs  T(C): 36.8 (27 Dec 2019 15:30), Max: 37.1 (26 Dec 2019 22:35)  T(F): 98.3 (27 Dec 2019 15:30), Max: 98.8 (26 Dec 2019 22:35)  HR: 80 (27 Dec 2019 15:30) (77 - 82)  BP: 147/76 (27 Dec 2019 15:30) (122/70 - 167/80)  BP(mean): --  RR: 16 (27 Dec 2019 15:30) (16 - 18)  SpO2: 95% (27 Dec 2019 15:30) (93% - 97%)    Physical Exam:  General: Well developed, frail, elderly, NAD  HEENT: NCAT, PERRLA, EOMI bl, dry mucous membranes   Neck: Supple, no JVD  Neurology: A&Ox3, nonfocal, CN II-XII grossly intact, sensation intact, Strength 4/5 b/l LE  Respiratory: CTA B/L, No W/R/R  CV: RRR, S1/S2, no murmurs, rubs or gallops  Abdominal: Soft, NT, ND, BS in 4 quadrants  Extremities: No LE edema, +2 peripheral pulses b/l DP, PT, and radial. Right foot 2nd toe s/p amputation  MSK: Normal ROM, no joint erythema or warmth, no joint swelling   Skin: warm, dry

## 2019-12-27 NOTE — H&P ADULT - NSHPSOCIALHISTORY_GEN_ALL_CORE
Tobacco: Former smoker, quit > 20 years ago, former 1PPD smoker  EtOH: socially, rarely  Recreational drug use: denies  Lives: alone, senior living community, has aide 7-8 hours, 7 days/week  Ambulates: with walker  ADLs: independent  Occupation:  (retired)  Vaccinations:  -Flu: received [id number] season  -PNA: received within past 5 years

## 2019-12-27 NOTE — PHYSICAL THERAPY INITIAL EVALUATION ADULT - ADDITIONAL COMMENTS
pt lives in 1st floor apartment w/ no stairs to negotiate.  Pt has an aide ~8 hrs/[id number].  Pt report is a community ambulator.

## 2019-12-27 NOTE — H&P ADULT - PROBLEM SELECTOR PLAN 1
Patient BIBEMS with epistaxis, resolved in the ED without any intervention taking place  -No known trauma to the nose, however patient noted to be on coumadin, though INR noted to be subtherapeutic (1.79)  - Hb on admission labs noted to be 8.1  -Continue to monitor h/h

## 2019-12-27 NOTE — H&P ADULT - ATTENDING COMMENTS
Agree with the above. Will admit her epistaxis and also will need TIFFANIE placement as per PT. Will need continued PT.   Will hold coumadin tonight as stated above and repeat INR in AM due to her epistaxis.       T(C): 36.8 (12-27-19 @ 15:30), Max: 37.1 (12-26-19 @ 22:35)  HR: 80 (12-27-19 @ 15:30) (77 - 82)  BP: 147/76 (12-27-19 @ 15:30) (122/70 - 167/80)  RR: 16 (12-27-19 @ 15:30) (16 - 18)  SpO2: 95% (12-27-19 @ 15:30) (93% - 97%)  Wt(kg): --    Physical Exam:   GENERAL: well-groomed, well-developed, NAD  HEENT: head NC/AT; EOM intact, conjunctiva & sclera clear; hearing grossly intact, moist mucous membranes  NECK: supple, no JVD  RESPIRATORY: CTA B/L, no wheezing, rales, rhonchi or rubs  CARDIOVASCULAR: S1&S2, irreg, no murmurs or gallops  ABDOMEN: soft, non-tender, non-distended, + Bowel sounds x4 quadrants, no guarding, rebound or rigidity  MUSCULOSKELETAL:  no clubbing, cyanosis or edema of all 4 extremities  LYMPH: no cervical lymphadenopathy  VASCULAR: Radial pulses 2+ bilaterally, no varicose veins   SKIN: warm and dry, color normal  NEUROLOGIC: AA&O X3, CN2-12 intact w/ no focal deficits, no sensory loss, motor Strength 4/5 in UE & LE B/L  Psych: Normal mood and affect, normal behavior        Remainder as above. Agree with the above. Will admit her epistaxis and also will need TIFFANIE placement as per PT. Will need continued PT.   Will hold coumadin tonight as stated above and repeat INR in AM due to her epistaxis.       T(C): 36.8 (12-27-19 @ 15:30), Max: 37.1 (12-26-19 @ 22:35)  HR: 80 (12-27-19 @ 15:30) (77 - 82)  BP: 147/76 (12-27-19 @ 15:30) (122/70 - 167/80)  RR: 16 (12-27-19 @ 15:30) (16 - 18)  SpO2: 95% (12-27-19 @ 15:30) (93% - 97%)  Wt(kg): --    Physical Exam:   GENERAL: well-groomed, well-developed, NAD  HEENT: head NC/AT; EOM intact, conjunctiva & sclera clear; hearing grossly intact, moist mucous membranes  NECK: supple, no JVD  RESPIRATORY: CTA B/L, no wheezing, rales, rhonchi or rubs  CARDIOVASCULAR: S1&S2, irreg, no murmurs or gallops  ABDOMEN: soft, non-tender, non-distended, + Bowel sounds x4 quadrants, no guarding, rebound or rigidity  MUSCULOSKELETAL:  no clubbing, cyanosis or edema of all 4 extremities  LYMPH: no cervical lymphadenopathy  VASCULAR: Radial pulses 2+ bilaterally, no varicose veins   SKIN: warm and dry, color normal  NEUROLOGIC: AA&O X3, CN2-12 intact w/ no focal deficits, no sensory loss, motor Strength 4/5 in UE & LE B/L  Psych: Normal mood and affect, normal behavior    Leukocystosis likely reactive do not suspect infectious etiology.   Anemia: see above.    Remainder as above.

## 2019-12-27 NOTE — H&P ADULT - PROBLEM SELECTOR PLAN 4
Chronic, stable  -AC with coumadin - INR noted to be subtherapeutic 1.79, will dose coumadin 2.5mg tonight as epistaxis resolved  -rate control with metoprolol succ 150mg qd, and diltiazem 120mg ER x 1  -Admission EKG pending  -admit to tele - monitor for occult arrhtymias Chronic, stable  -AC with coumadin - INR noted to be subtherapeutic 1.79, however will hold coumadin tonight due to her significant epistaxis.   -rate control with metoprolol succ 150mg qd, and diltiazem 120mg ER x 1  -Admission EKG pending  -admit to tele - monitor for occult arrhtymias  -repeat INR for AM

## 2019-12-27 NOTE — H&P ADULT - PROBLEM SELECTOR PLAN 2
Patient with Hb 8.1 on admission labs, on chart review baseline Hb 9-10  -Likely due to blood loss from epistaxis, resultant weakness  -Now without signs/symptoms of active bleeding  -Monitor H/H

## 2019-12-27 NOTE — H&P ADULT - PROBLEM SELECTOR PLAN 3
Upon evaluation in the ED patient noted to have difficulty walking, citing weakness  -Endorsed weakness on the AM of 12/26 upon waking and having to use walls for support to ambulate throughout her home before calling EMS  -Weakness and difficulty walking likely multifactorial - deconditioning, poor nutrition, and blood loss.  -Social work and nutrition eval  -PT eval in ED - recommend rehab for patient, will continue to follow

## 2019-12-27 NOTE — PATIENT PROFILE ADULT - DOES PATIENT HAVE ADVANCE DIRECTIVE
daughter is the health care proxy, said she will bring in the papers from home daughter is the health care proxy, said she will bring in the papers from home/yes

## 2019-12-27 NOTE — H&P ADULT - HISTORY OF PRESENT ILLNESS
83 yo female w/ PMH of afib (coumadin), CVA and hx of aneurysm repair, HTN, HLD, mitral valve prolapse, PVD s/p stents x 3 (RLE), rheumatoid arthritis, cataracts of the L eye BIBEMS to ED for evaluation of epistaxis. Patient noted to have start of nose bleed 12/25 evening when changing to get into bed, denies any nose-blowing or trauma, though she believes she may have sneezed at the onset. She noted "large drops" of blood at onset, at which time she applied an icepack to her nose, and tilted her head back, noted bleeding to slow, and went to bed. When she woke on the AM of 12/26 had blood all over his hands and pillow at which time EMS was activated. Upon waking on 12/26 she noted she felt weak and had to use the walls for support to ambulate. Denies any dizziness, SOB, chest pain, vision changes. Epistaxis noted to resolve without intervention in the ED. Denies any sick contacts or recent travel.     ED Vitals: T 97.5 HR 95 /78 RR 18 SpO2 97% on room air  Labs: WBC 13.15, anemic with HB 8.1, INR 1.79  CXR: Right perihilar airspace disease, atherosclerotic aorta. No effusion.  EKG: Pending at the time of admission  In the ED Patient received tylenol 650mg x 1 84/F w/ PMHx of afib (coumadin), CVA and hx of aneurysm repair, HTN, HLD, mitral valve prolapse, PVD s/p stents x 3 (RLE), rheumatoid arthritis, cataracts of the L eye BIBEMS to ED for evaluation of epistaxis. Patient noted to have start of nose bleed 12/25 evening when changing to get into bed, denies any nose-blowing or trauma, though she believes she may have sneezed at the onset. She noted "large drops" of blood at onset, at which time she applied an icepack to her nose, and tilted her head back, noted bleeding to slow, and went to bed. When she woke on the AM of 12/26 had blood all over his hands and pillow at which time EMS was activated. Upon waking on 12/26 she noted she felt weak and had to use the walls for support to ambulate. Denies any dizziness, SOB, chest pain, vision changes. Epistaxis noted to resolve without intervention in the ED. Denies any sick contacts or recent travel.     ED Vitals: T 97.5 HR 95 /78 RR 18 SpO2 97% on room air  Labs: WBC 13.15, anemic with HB 8.1, INR 1.79  CXR: Right perihilar airspace disease, atherosclerotic aorta. No effusion.  EKG: Pending at the time of admission  In the ED Patient received tylenol 650mg x 1

## 2019-12-27 NOTE — H&P ADULT - ASSESSMENT
84/F w/ PMHx of afib (coumadin), CVA and hx of aneurysm repair, HTN, HLD, mitral valve prolapse, PVD s/p stents x 3 (RLE), rheumatoid arthritis, cataracts of the L eye BIBEMS to ED for evaluation of epistaxis, which resolved without intervention. Noted to have ambulatory difficulty, admit to [facility]

## 2019-12-27 NOTE — H&P ADULT - PROBLEM SELECTOR PLAN 6
Chronic, stable  -Continue metoprolol, diltiazem, and hydralazine 25mg TID with hold parameters  -DASH/TLC diet  -routine hemodynamic monitoring

## 2019-12-28 DIAGNOSIS — J43.9 EMPHYSEMA, UNSPECIFIED: ICD-10-CM

## 2019-12-28 DIAGNOSIS — R54 AGE-RELATED PHYSICAL DEBILITY: ICD-10-CM

## 2019-12-28 DIAGNOSIS — Z71.89 OTHER SPECIFIED COUNSELING: ICD-10-CM

## 2019-12-28 DIAGNOSIS — I34.1 NONRHEUMATIC MITRAL (VALVE) PROLAPSE: ICD-10-CM

## 2019-12-28 LAB
ALBUMIN SERPL ELPH-MCNC: 3.1 G/DL — LOW (ref 3.3–5)
ALP SERPL-CCNC: 61 U/L — SIGNIFICANT CHANGE UP (ref 40–120)
ALT FLD-CCNC: 15 U/L — SIGNIFICANT CHANGE UP (ref 12–78)
ANION GAP SERPL CALC-SCNC: 7 MMOL/L — SIGNIFICANT CHANGE UP (ref 5–17)
AST SERPL-CCNC: 16 U/L — SIGNIFICANT CHANGE UP (ref 15–37)
BASOPHILS # BLD AUTO: 0.02 K/UL — SIGNIFICANT CHANGE UP (ref 0–0.2)
BASOPHILS NFR BLD AUTO: 0.3 % — SIGNIFICANT CHANGE UP (ref 0–2)
BILIRUB SERPL-MCNC: 1 MG/DL — SIGNIFICANT CHANGE UP (ref 0.2–1.2)
BUN SERPL-MCNC: 42 MG/DL — HIGH (ref 7–23)
CALCIUM SERPL-MCNC: 8.5 MG/DL — SIGNIFICANT CHANGE UP (ref 8.5–10.1)
CHLORIDE SERPL-SCNC: 112 MMOL/L — HIGH (ref 96–108)
CO2 SERPL-SCNC: 25 MMOL/L — SIGNIFICANT CHANGE UP (ref 22–31)
CREAT SERPL-MCNC: 1.1 MG/DL — SIGNIFICANT CHANGE UP (ref 0.5–1.3)
EOSINOPHIL # BLD AUTO: 0.07 K/UL — SIGNIFICANT CHANGE UP (ref 0–0.5)
EOSINOPHIL NFR BLD AUTO: 0.9 % — SIGNIFICANT CHANGE UP (ref 0–6)
FERRITIN SERPL-MCNC: 106 NG/ML — SIGNIFICANT CHANGE UP (ref 15–150)
GLUCOSE SERPL-MCNC: 91 MG/DL — SIGNIFICANT CHANGE UP (ref 70–99)
HCT VFR BLD CALC: 25.9 % — LOW (ref 34.5–45)
HGB BLD-MCNC: 8 G/DL — LOW (ref 11.5–15.5)
IMM GRANULOCYTES NFR BLD AUTO: 0.5 % — SIGNIFICANT CHANGE UP (ref 0–1.5)
INR BLD: 1.27 RATIO — HIGH (ref 0.88–1.16)
IRON SATN MFR SERPL: 16 UG/DL — LOW (ref 30–160)
IRON SATN MFR SERPL: 7 % — LOW (ref 14–50)
LYMPHOCYTES # BLD AUTO: 0.74 K/UL — LOW (ref 1–3.3)
LYMPHOCYTES # BLD AUTO: 9.4 % — LOW (ref 13–44)
MCHC RBC-ENTMCNC: 27.5 PG — SIGNIFICANT CHANGE UP (ref 27–34)
MCHC RBC-ENTMCNC: 30.9 GM/DL — LOW (ref 32–36)
MCV RBC AUTO: 89 FL — SIGNIFICANT CHANGE UP (ref 80–100)
MONOCYTES # BLD AUTO: 0.55 K/UL — SIGNIFICANT CHANGE UP (ref 0–0.9)
MONOCYTES NFR BLD AUTO: 7 % — SIGNIFICANT CHANGE UP (ref 2–14)
NEUTROPHILS # BLD AUTO: 6.42 K/UL — SIGNIFICANT CHANGE UP (ref 1.8–7.4)
NEUTROPHILS NFR BLD AUTO: 81.9 % — HIGH (ref 43–77)
NRBC # BLD: 0 /100 WBCS — SIGNIFICANT CHANGE UP (ref 0–0)
PLATELET # BLD AUTO: 140 K/UL — LOW (ref 150–400)
POTASSIUM SERPL-MCNC: 3.4 MMOL/L — LOW (ref 3.5–5.3)
POTASSIUM SERPL-SCNC: 3.4 MMOL/L — LOW (ref 3.5–5.3)
PROT SERPL-MCNC: 6.9 G/DL — SIGNIFICANT CHANGE UP (ref 6–8.3)
PROTHROM AB SERPL-ACNC: 14.5 SEC — HIGH (ref 10–12.9)
RBC # BLD: 2.91 M/UL — LOW (ref 3.8–5.2)
RBC # FLD: 16 % — HIGH (ref 10.3–14.5)
SODIUM SERPL-SCNC: 144 MMOL/L — SIGNIFICANT CHANGE UP (ref 135–145)
TIBC SERPL-MCNC: 243 UG/DL — SIGNIFICANT CHANGE UP (ref 220–430)
UIBC SERPL-MCNC: 227 UG/DL — SIGNIFICANT CHANGE UP (ref 110–370)
WBC # BLD: 7.84 K/UL — SIGNIFICANT CHANGE UP (ref 3.8–10.5)
WBC # FLD AUTO: 7.84 K/UL — SIGNIFICANT CHANGE UP (ref 3.8–10.5)

## 2019-12-28 PROCEDURE — 70450 CT HEAD/BRAIN W/O DYE: CPT | Mod: 26

## 2019-12-28 PROCEDURE — 71275 CT ANGIOGRAPHY CHEST: CPT | Mod: 26

## 2019-12-28 PROCEDURE — 99223 1ST HOSP IP/OBS HIGH 75: CPT

## 2019-12-28 PROCEDURE — 99233 SBSQ HOSP IP/OBS HIGH 50: CPT

## 2019-12-28 PROCEDURE — 71045 X-RAY EXAM CHEST 1 VIEW: CPT | Mod: 26

## 2019-12-28 RX ORDER — PIPERACILLIN AND TAZOBACTAM 4; .5 G/20ML; G/20ML
3.38 INJECTION, POWDER, LYOPHILIZED, FOR SOLUTION INTRAVENOUS EVERY 8 HOURS
Refills: 0 | Status: DISCONTINUED | OUTPATIENT
Start: 2019-12-28 | End: 2019-12-31

## 2019-12-28 RX ORDER — PIPERACILLIN AND TAZOBACTAM 4; .5 G/20ML; G/20ML
3.38 INJECTION, POWDER, LYOPHILIZED, FOR SOLUTION INTRAVENOUS ONCE
Refills: 0 | Status: COMPLETED | OUTPATIENT
Start: 2019-12-28 | End: 2019-12-28

## 2019-12-28 RX ORDER — WARFARIN SODIUM 2.5 MG/1
3 TABLET ORAL ONCE
Refills: 0 | Status: COMPLETED | OUTPATIENT
Start: 2019-12-28 | End: 2019-12-28

## 2019-12-28 RX ORDER — SODIUM CHLORIDE 0.65 %
1 AEROSOL, SPRAY (ML) NASAL
Refills: 0 | Status: DISCONTINUED | OUTPATIENT
Start: 2019-12-28 | End: 2020-01-04

## 2019-12-28 RX ORDER — ONDANSETRON 8 MG/1
4 TABLET, FILM COATED ORAL ONCE
Refills: 0 | Status: COMPLETED | OUTPATIENT
Start: 2019-12-28 | End: 2019-12-28

## 2019-12-28 RX ORDER — HYDRALAZINE HCL 50 MG
50 TABLET ORAL THREE TIMES A DAY
Refills: 0 | Status: DISCONTINUED | OUTPATIENT
Start: 2019-12-28 | End: 2019-12-29

## 2019-12-28 RX ORDER — IPRATROPIUM/ALBUTEROL SULFATE 18-103MCG
3 AEROSOL WITH ADAPTER (GRAM) INHALATION EVERY 8 HOURS
Refills: 0 | Status: DISCONTINUED | OUTPATIENT
Start: 2019-12-28 | End: 2020-01-04

## 2019-12-28 RX ORDER — BACITRACIN ZINC 500 UNIT/G
1 OINTMENT IN PACKET (EA) TOPICAL THREE TIMES A DAY
Refills: 0 | Status: DISCONTINUED | OUTPATIENT
Start: 2019-12-28 | End: 2020-01-04

## 2019-12-28 RX ORDER — HYDRALAZINE HCL 50 MG
10 TABLET ORAL ONCE
Refills: 0 | Status: COMPLETED | OUTPATIENT
Start: 2019-12-28 | End: 2019-12-29

## 2019-12-28 RX ORDER — HYDRALAZINE HCL 50 MG
10 TABLET ORAL ONCE
Refills: 0 | Status: DISCONTINUED | OUTPATIENT
Start: 2019-12-28 | End: 2019-12-28

## 2019-12-28 RX ORDER — POTASSIUM CHLORIDE 20 MEQ
40 PACKET (EA) ORAL ONCE
Refills: 0 | Status: COMPLETED | OUTPATIENT
Start: 2019-12-28 | End: 2019-12-28

## 2019-12-28 RX ORDER — HYDRALAZINE HCL 50 MG
10 TABLET ORAL ONCE
Refills: 0 | Status: COMPLETED | OUTPATIENT
Start: 2019-12-28 | End: 2019-12-28

## 2019-12-28 RX ORDER — HYDRALAZINE HCL 50 MG
25 TABLET ORAL ONCE
Refills: 0 | Status: COMPLETED | OUTPATIENT
Start: 2019-12-28 | End: 2019-12-28

## 2019-12-28 RX ADMIN — Medication 50 MILLIGRAM(S): at 14:44

## 2019-12-28 RX ADMIN — WARFARIN SODIUM 3 MILLIGRAM(S): 2.5 TABLET ORAL at 22:44

## 2019-12-28 RX ADMIN — PIPERACILLIN AND TAZOBACTAM 200 GRAM(S): 4; .5 INJECTION, POWDER, LYOPHILIZED, FOR SOLUTION INTRAVENOUS at 17:12

## 2019-12-28 RX ADMIN — Medication 30 MILLILITER(S): at 15:34

## 2019-12-28 RX ADMIN — PANTOPRAZOLE SODIUM 40 MILLIGRAM(S): 20 TABLET, DELAYED RELEASE ORAL at 05:31

## 2019-12-28 RX ADMIN — SENNA PLUS 2 TABLET(S): 8.6 TABLET ORAL at 22:44

## 2019-12-28 RX ADMIN — Medication 10 MILLIGRAM(S): at 08:05

## 2019-12-28 RX ADMIN — Medication 1 TABLET(S): at 11:25

## 2019-12-28 RX ADMIN — ATORVASTATIN CALCIUM 40 MILLIGRAM(S): 80 TABLET, FILM COATED ORAL at 22:44

## 2019-12-28 RX ADMIN — ONDANSETRON 4 MILLIGRAM(S): 8 TABLET, FILM COATED ORAL at 12:55

## 2019-12-28 RX ADMIN — Medication 3 MILLILITER(S): at 15:48

## 2019-12-28 RX ADMIN — Medication 81 MILLIGRAM(S): at 11:25

## 2019-12-28 RX ADMIN — Medication 1 SPRAY(S): at 17:58

## 2019-12-28 RX ADMIN — Medication 50 MILLIGRAM(S): at 22:44

## 2019-12-28 RX ADMIN — Medication 25 MILLIGRAM(S): at 16:18

## 2019-12-28 RX ADMIN — Medication 40 MILLIEQUIVALENT(S): at 10:08

## 2019-12-28 RX ADMIN — Medication 150 MILLIGRAM(S): at 05:31

## 2019-12-28 RX ADMIN — Medication 1 APPLICATION(S): at 22:45

## 2019-12-28 RX ADMIN — Medication 25 MILLIGRAM(S): at 05:31

## 2019-12-28 RX ADMIN — Medication 120 MILLIGRAM(S): at 05:31

## 2019-12-28 NOTE — PROGRESS NOTE ADULT - SUBJECTIVE AND OBJECTIVE BOX
Patient is a 84y old  Female who presents with a chief complaint of Epistaxis (27 Dec 2019 15:33)      INTERVAL HPI: Pt seen and examined. States she is feeling fatigued, on commode at time of my exam. Denies any further epistaxis episodes or other acute complaints.     OVERNIGHT EVENTS: elevated, this AM upgraded to tele by this writer after elevaetd sbp 190s, cardio dr ortiz consulted, monitor pulse ox as well  T(F): 97.7 (12-28-19 @ 08:23), Max: 98.9 (12-27-19 @ 20:34)  HR: 85 (12-28-19 @ 08:23) (74 - 85)  BP: 158/84 (12-28-19 @ 08:23) (147/76 - 193/78)  RR: 15 (12-28-19 @ 08:23) (15 - 17)  SpO2: 93% (12-28-19 @ 08:23) (93% - 97%)  I&O's Summary    27 Dec 2019 07:01  -  28 Dec 2019 07:00  --------------------------------------------------------  IN: 0 mL / OUT: 200 mL / NET: -200 mL        REVIEW OF SYSTEMS:  CONSTITUTIONAL: No fever, weight loss, or fatigue  HEENT: denies epistaxis  RESPIRATORY: No cough, wheezing, chills or hemoptysis; No shortness of breath  CARDIOVASCULAR: No chest pain, palpitations, dizziness, or leg swelling  GASTROINTESTINAL: No abdominal or epigastric pain. No nausea, vomiting, or hematemesis; No diarrhea or constipation. No melena or hematochezia.  GENITOURINARY: No dysuria, frequency, hematuria, or incontinence  NEUROLOGICAL: No headaches, memory loss, loss of strength, numbness, or tremors  SKIN: No itching, burning, rashes, or lesions   MUSCULOSKELETAL: No joint pain or swelling; No muscle, back, or extremity pain  PSYCHIATRIC: No depression, anxiety, mood swings, or difficulty sleeping      PHYSICAL EXAM:  GENERAL: NAD, well-groomed, well-developed  NERVOUS SYSTEM:  Alert & Oriented X3, Motor Strength 3/5 B/L upper and lower extremities  CHEST/LUNG: Clear to percussion bilaterally; No rales, rhonchi, wheezing, or rubs  HEART: Regular rate and rhythm; No murmurs, rubs, or gallops  ABDOMEN: Soft, Nontender, Nondistended; Bowel sounds present  EXTREMITIES:  2+ Peripheral Pulses, No clubbing, cyanosis, or edema  SKIN: No rashes or lesions    LABS:                        8.0    7.84  )-----------( 140      ( 28 Dec 2019 07:13 )             25.9     12-28    144  |  112<H>  |  42<H>  ----------------------------<  91  3.4<L>   |  25  |  1.10    Ca    8.5      28 Dec 2019 07:13    TPro  6.9  /  Alb  3.1<L>  /  TBili  1.0  /  DBili  x   /  AST  16  /  ALT  15  /  AlkPhos  61  12-28    PT/INR - ( 28 Dec 2019 07:13 )   PT: 14.5 sec;   INR: 1.27 ratio         PTT - ( 26 Dec 2019 18:39 )  PTT:36.1 sec    CAPILLARY BLOOD GLUCOSE                  MEDICATIONS  (STANDING):  aspirin enteric coated 81 milliGRAM(s) Oral daily  atorvastatin 40 milliGRAM(s) Oral at bedtime  diltiazem    milliGRAM(s) Oral daily  hydrALAZINE 25 milliGRAM(s) Oral three times a day  metoprolol succinate  milliGRAM(s) Oral daily  multivitamin 1 Tablet(s) Oral daily  pantoprazole    Tablet 40 milliGRAM(s) Oral before breakfast  senna 2 Tablet(s) Oral at bedtime    MEDICATIONS  (PRN):  melatonin 5 milliGRAM(s) Oral at bedtime PRN Insomnia

## 2019-12-28 NOTE — DIETITIAN INITIAL EVALUATION ADULT. - OTHER INFO
84/F w/ PMHx of afib (coumadin), CVA and hx of aneurysm repair, HTN, HLD, mitral valve prolapse, PVD s/p stents x 3 (RLE), rheumatoid arthritis, cataracts of the L eye BIBEMS to ED for evaluation of epistaxis, which resolved without intervention. Noted to have ambulatory difficulty, weakness a/w acute on chronic anemia sec to epistaxis from coumadin req tele monitoring.  Limited interview obtained from pt- pt stated "I cant talk anymore".  Pt appears weak, bfst tray untouched.  Stated she felt like she was having a heart attack this morning.  NFPE conducted.  +severe muscle wasting at temples, clavicles, shoulders, calf, +moderate thigh wasting.  +orbital and tricep subcutaneous fat loss.  No edema. Pt reports she's lost a lot of weight.  Thinks she weighs 105#.  Pt weighed- 93# on bedscale.  Reports has an aid during day but she can't cook.  Daughter has [employer] delivery for pt.  Pt drinks 1-2 Ensure a day at home.  Per prior RDN notes, pt with gradual wt loss over past 4 years.  Wt last admission in June 115.3# (with pt stating wt of 110#).  Additional 17-23# possible x 6 months.  Pt meets criteria for chronic severe malnutrition related to age-related changes in appetite and po intake in setting of multiple medical comorbidities evidenced by severe muscle wasting and fat loss, significant wt loss, and poor oral intake/ skipping meals >1 month.

## 2019-12-28 NOTE — PROGRESS NOTE ADULT - PROBLEM SELECTOR PLAN 4
Chronic, stable  -AC with coumadin - INR noted to be subtherapeutic 1.79, however will hold coumadin tonight due to her significant epistaxis.   -apprec cardio and hemat collaboration if ok to restart given risks vs benefits  -rate control with metoprolol succ 150mg qd, and diltiazem 120mg ER x 1  -Admission EKG pending  -admit to tele - monitor for occult arrhtymias  -trend INR

## 2019-12-28 NOTE — DIETITIAN INITIAL EVALUATION ADULT. - PROBLEM SELECTOR PROBLEM 5
----- Message from Daniella Aguilar PA-C sent at 8/6/2018  5:28 PM CDT -----  Please advise patient to start OTC Zyrtec 10mg po qAM. He may continue the hydroxyzine 10mg po qhs. Also, recommend cool showers, cool compresses, and OTC Sarna Anti-itch lotion prn itching. Advise to keep scheduled f/u for further evaluation.    Thanks!  ----- Message -----  From: Jadon Pat MA  Sent: 8/6/2018   3:50 PM  To: Daniella Aguilar PA-C    Patient is scheduled for next Monday for a follow-up.  He said medication isn't helping and he is itching. He is asking for something he can use to treat itching until he sees you on Monday.     CVA (cerebral vascular accident)

## 2019-12-28 NOTE — PROVIDER CONTACT NOTE (OTHER) - SITUATION
Pt vomited at breakfast this morning and did not want lunch. Zofran given prior to pt leaving for CT scan.

## 2019-12-28 NOTE — CONSULT NOTE ADULT - SUBJECTIVE AND OBJECTIVE BOX
Date/Time Patient Seen:  		  Referring MD:   Data Reviewed	       Patient is a 84y old  Female who presents with a chief complaint of Epistaxis (28 Dec 2019 08:24)      Subjective/HPI    in bed  seen and examined  vs and meds reviewed  labs reviewed  spoke with dtr MANOHAR    h and p reviewed  er provider note reviewed  labs and imaging reviewed  pt is on room air    verbal  weak  frail  non smoker  non drinker  lives alone - has an aide  retired  family hx - ashd - father    Language:  · Patient/Family of Limited English Proficiency	No     Patient Identity:  · Birth Sex	Female       History of Present Illness:  Reason for Admission: Epistaxis  History of Present Illness:   84/F w/ PMHx of afib (coumadin), CVA and hx of aneurysm repair, HTN, HLD, mitral valve prolapse, PVD s/p stents x 3 (RLE), rheumatoid arthritis, cataracts of the L eye BIBEMS to ED for evaluation of epistaxis. Patient noted to have start of nose bleed 12/25 evening when changing to get into bed, denies any nose-blowing or trauma, though she believes she may have sneezed at the onset. She noted "large drops" of blood at onset, at which time she applied an icepack to her nose, and tilted her head back, noted bleeding to slow, and went to bed. When she woke on the AM of 12/26 had blood all over his hands and pillow at which time EMS was activated. Upon waking on 12/26 she noted she felt weak and had to use the walls for support to ambulate. Denies any dizziness, SOB, chest pain, vision changes. Epistaxis noted to resolve without intervention in the ED. Denies any sick contacts or recent travel.     ED Vitals: T 97.5 HR 95 /78 RR 18 SpO2 97% on room air  Labs: WBC 13.15, anemic with HB 8.1, INR 1.79  CXR: Right perihilar airspace disease, atherosclerotic aorta. No effusion.  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  Osteoarthritis  Afib  PVD (peripheral vascular disease)  Neuropathy: (Right lower leg)  Gout  H/O cerebral aneurysm repair: brain clips  CVA (cerebral vascular accident): (&quot;Mini-stroke&quot;,1990&#x27;s)  Rheumatoid arthritis  Asthma  Hyperlipidemia  Hypertension  S/P cataract surgery: (Left eye)  Elective surgery: (&quot;Twisted bowel&quot;, 2014)  Elective surgery: (Exision of cyst on liver, 1985)  S/P ORIF (open reduction internal fixation) fracture: Left hip fx (2012) &amp; R hip fx (2013)  H/O cerebral aneurysm repair: Brain clips (1978(  Renal stone: Cysto stent placement 10/1/2014  PVD (peripheral vascular disease): s/p RLE bypass x 3, most recent 3/2012 Right external iliac to PT bypass w/ PTFE (2012)  S/P FRED (total abdominal hysterectomy): (1987, Hx of &quot;ovarian cancer?&quot;)        Medication list         MEDICATIONS  (STANDING):  albuterol/ipratropium for Nebulization 3 milliLiter(s) Nebulizer every 8 hours  aspirin enteric coated 81 milliGRAM(s) Oral daily  atorvastatin 40 milliGRAM(s) Oral at bedtime  diltiazem    milliGRAM(s) Oral daily  hydrALAZINE 25 milliGRAM(s) Oral three times a day  metoprolol succinate  milliGRAM(s) Oral daily  multivitamin 1 Tablet(s) Oral daily  pantoprazole    Tablet 40 milliGRAM(s) Oral before breakfast  potassium chloride    Tablet ER 40 milliEquivalent(s) Oral once  senna 2 Tablet(s) Oral at bedtime    MEDICATIONS  (PRN):  melatonin 5 milliGRAM(s) Oral at bedtime PRN Insomnia         Vitals log        ICU Vital Signs Last 24 Hrs  T(C): 36.7 (28 Dec 2019 09:08), Max: 37.2 (27 Dec 2019 20:34)  T(F): 98.1 (28 Dec 2019 09:08), Max: 98.9 (27 Dec 2019 20:34)  HR: 74 (28 Dec 2019 09:08) (74 - 85)  BP: 150/72 (28 Dec 2019 09:08) (134/72 - 193/78)  BP(mean): --  ABP: --  ABP(mean): --  RR: 16 (28 Dec 2019 09:08) (14 - 17)  SpO2: 96% (28 Dec 2019 09:08) (93% - 97%)           Input and Output:  I&O's Detail    27 Dec 2019 07:01  -  28 Dec 2019 07:00  --------------------------------------------------------  IN:  Total IN: 0 mL    OUT:    Voided: 200 mL  Total OUT: 200 mL    Total NET: -200 mL          Lab Data                        8.0    7.84  )-----------( 140      ( 28 Dec 2019 07:13 )             25.9     12-28    144  |  112<H>  |  42<H>  ----------------------------<  91  3.4<L>   |  25  |  1.10    Ca    8.5      28 Dec 2019 07:13    TPro  6.9  /  Alb  3.1<L>  /  TBili  1.0  /  DBili  x   /  AST  16  /  ALT  15  /  AlkPhos  61  12-28            Review of Systems	  frail    Objective     Physical Examination    head at  heart s1s2  lung dec BS  abd soft      Pertinent Lab findings & Imaging      Bateman:  NO   Adequate UO     I&O's Detail    27 Dec 2019 07:01  -  28 Dec 2019 07:00  --------------------------------------------------------  IN:  Total IN: 0 mL    OUT:    Voided: 200 mL  Total OUT: 200 mL    Total NET: -200 mL               Discussed with:     Cultures:	        Radiology      EXAM:  XR CHEST PORTABLE URGENT 1V                            PROCEDURE DATE:  12/27/2019          INTERPRETATION:  Clinical information: Nosebleed, no additional information provided    Portable study of the chest, 3:09 PM    Comparison exam dated 6/12/2019    Right perihilar airspace disease. This could represent a chronic change? Correlate with follow up CT if clinically appropriate    Left lung normally expanded. No vascular congestion. No effusion. No pneumothorax. Heart size within normal limits. Hilar regions mediastinal contours intact. No acute osseous abnormalities. The aorta shows atherosclerotic changes.    IMPRESSION: See above report                COLIN RANDHAWA M.D.,ATTENDING RADIOLOGIST  This document has been electronically signed. Dec 27 2019  3:30PM

## 2019-12-28 NOTE — PROVIDER CONTACT NOTE (CHANGE IN STATUS NOTIFICATION) - BACKGROUND
Pt states "i'm not feeling well". BP remains high even after morning medication (cardizem, hydralizine, metoprolol) was given. INR is pending

## 2019-12-28 NOTE — CHART NOTE - NSCHARTNOTEFT_GEN_A_CORE
Upon Nutritional Assessment by the Registered Dietitian your patient was determined to meet criteria / has evidence of the following diagnosis/diagnoses:          [ ]  Mild Protein Calorie Malnutrition        [ ]  Moderate Protein Calorie Malnutrition        [x] Severe Protein Calorie Malnutrition        [ ] Unspecified Protein Calorie Malnutrition        [x] Underweight / BMI <19        [ ] Morbid Obesity / BMI > 40      Findings as based on:  •  Comprehensive nutrition assessment and consultation  •  Calorie counts (nutrient intake analysis)  •  Food acceptance and intake status from observations and pt recall  •  NFPE conducted 12/28/19 revealing severe muscle wasting temples, clavicles, shoulders, calf, moderate at thighs, +subcutaneous fat loss at triceps and orbital fat pads, significant wt loss, poor oral intake for >1 month.      Treatment:    The following diet has been recommended:  Liberalize to 2gm Na  Add Ensure Enlive 8oz tid    PROVIDER Section:     By signing this assessment you are acknowledging and agree with the diagnosis/diagnoses assigned by the Registered Dietitian    Comments:

## 2019-12-28 NOTE — CONSULT NOTE ADULT - SUBJECTIVE AND OBJECTIVE BOX
84 year old female with a right sided nosebleed that began on 12/26/19. She notes the nosebleed stopped on its own. She feels better since she has been in the hospital. Her coumadin levels have not been elevated.  She does have some weakness and discomfort since she has been in the hospital. Her nose has not bleed since she has been admitted. Patient notes some dryness and irritation but no pain in her nose or throat.   Patient has some congestion in her nose.          Review of Systems:  Review of Systems: REVIEW OF SYSTEMS  Constitutional:  No fever, chills, admits to fatigue, weakness  Neuro: No headache, numbness, weakness  ENT: admits to epistaxis (reslved)  Resp:  No Cough, no wheezing; dyspnea on exertion   CVS: No chest pain, palpitations, leg swelling   GI: No abdominal pain, nausea, vomiting, diarrhea   : No dysuria, frequency, incontinence  Skin: No itching, burning, rashes, or lesions  Msk: Admits to LE weakness, no joint pain	      Allergies and Intolerances:        Allergies:  	No Known Allergies:     Home Medications:   * Patient Currently Takes Medications as of 27-Dec-2019 15:55 documented in Structured Notes  · 	pantoprazole 40 mg oral delayed release tablet: Last Dose Taken:  , 1 tab(s) orally every 12 hours  · 	melatonin 5 mg oral tablet: Last Dose Taken:  , 1 tab(s) orally once a day (at bedtime), As Needed  · 	dilTIAZem 120 mg/24 hours oral capsule, extended release: Last Dose Taken:  , 1 cap(s) orally once a day  · 	metoprolol succinate 100 mg oral tablet, extended release: Last Dose Taken:  , 1.5 tab(s) orally once a day  · 	senna oral tablet: Last Dose Taken:  , 2 tab(s) orally once a day (at bedtime)  · 	atorvastatin 40 mg oral tablet: Last Dose Taken:  , 1 tab(s) orally once a day (at bedtime)  · 	multivitamin: Last Dose Taken:  ,   once a day  · 	aspirin 81 mg oral delayed release tablet: Last Dose Taken:  , 1 tab(s) orally once a day  · 	Coumadin 2.5 mg oral tablet: Last Dose Taken:  , 1 tab(s) orally once a day Monday-Saturday  · 	Coumadin 5 mg oral tablet: Last Dose Taken:  , 1 tab(s) orally once a day for 1 days - sunday  · 	hydrALAZINE 25 mg oral tablet: Last Dose Taken:  , 1 tab(s) orally 3 times a day    .    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     PAST SURGICAL HISTORY:  Elective surgery (Exision of cyst on liver, 1985)    Elective surgery ("Twisted bowel", 2014)    H/O cerebral aneurysm repair Brain clips (1978(    PVD (peripheral vascular disease) s/p RLE bypass x 3, most recent 3/2012 Right external iliac to PT bypass w/ PTFE (2012)    Renal stone Cysto stent placement 10/1/2014    S/P cataract surgery (Left eye)    S/P ORIF (open reduction internal fixation) fracture Left hip fx (2012) & R hip fx (2013)    S/P FRED (total abdominal hysterectomy) (1987, Hx of "ovarian cancer?").     FAMILY HISTORY:  No pertinent family history in first degree relatives.     No Pertinent Family History in first degree relatives of: early onset CAD.     Social History:  Social History (marital status, living situation, occupation, tobacco use, alcohol and drug use, and sexual history): Tobacco: Former smoker, quit > 20 years ago, former 1PPD smoker  EtOH: socially, rarely  Recreational drug use: denies  Lives: alone, senior living community, has aide 7-8 hours, 7 days/week  Ambulates: with walker  ADLs: independent  Occupation:  (retired)  Vaccinations:  -Flu: received 5010-8671 season -PNA: received within past 5 years	    PE: AOx3, weak frail eldery female.  Ears: TM intact and ME space well aerated  Nose: S-shaped deviation, no active bleeding or clots  Neck supple  OC/OP WNL    Impression: Epistaxis    Plan: Keep nose moist with Ocean nasal spray and Bacitracin ointment.

## 2019-12-28 NOTE — CONSULT NOTE ADULT - ASSESSMENT
84/F w/ PMHx of afib (coumadin), CVA and hx of aneurysm repair, HTN, HLD, mitral valve prolapse, PVD s/p stents x 3 (RLE), rheumatoid arthritis, cataracts of the L eye BIBEMS to ED for evaluation of epistaxis.    - BP is uncontrolled with periods of hypertensive urgency overnight.  Continue IV hydralazine prn systolic blood pressure greater than 160/90  - To increase daily standing hydralazine to 50 TID given uncontrolled Bp  - No evidence of active ischemia or volume overload  - Epistaxis placing patient at risk for acute blood loss anemia  - Full dose AC with Coumadin on hold for now.  Once she demonstrates that her H/H are stable and that she has no further epistaxis, will consider resuming Coumadin.  At the present time, the risk is high and will hold.    - Continue aspirin 81 QD  - Continue statin drug  - Continue diltiazem 120 QD and Toprol 150 QD  - Monitor and replete lytes  - To follow closely with you while admitted

## 2019-12-28 NOTE — PROGRESS NOTE ADULT - PROBLEM SELECTOR PLAN 3
-acute on chronic  -Weakness and difficulty walking likely multifactorial - deconditioning, poor nutrition, and blood loss.  -Social work and nutrition eval  -PT eval in ED - recommend rehab for patient, will continue to follow

## 2019-12-28 NOTE — DIETITIAN INITIAL EVALUATION ADULT. - PROBLEM SELECTOR PLAN 4
Chronic, stable  -AC with coumadin - INR noted to be subtherapeutic 1.79, however will hold coumadin tonight due to her significant epistaxis.   -rate control with metoprolol succ 150mg qd, and diltiazem 120mg ER x 1  -Admission EKG pending  -admit to tele - monitor for occult arrhtymias  -repeat INR for AM

## 2019-12-28 NOTE — PROVIDER CONTACT NOTE (OTHER) - BACKGROUND
Patient Name: Alexis Coleman  Procedure Date: 8/20/2019 9:18 AM  MRN: [id number]  Account Number: [id number]  YOB: 1982  Admit Type: Inpatient  Age: 36  Gender: Male  Race: White  Attending MD: Benedict Nichols ,  Grafts or Implants: None  Procedure:            Upper GI endoscopy  Indications:          Foreign body in the esophagus  Patient Profile:      This is a 36 year old male.  Providers:            Benedict Nichols  Attending Participation:       I personally performed the entire procedure.  Sedation:             Monitored Anesthesia Care  Procedure:       Pre-Anesthesia Assessment:       - ASA Grade Assessment: II - A patient with mild systemic disease.       - After reviewing the risks and benefits, the patient was deemed in       satisfactory condition to undergo the procedure.       - General anesthesia under the supervision of an anesthesiologist was       determined to be medically necessary for this procedure based on review       of the patient's medical history, medications, and prior anesthesia       history.       A History and Physical was performed prior to the procedure. Patient       medications and allergies were reviewed. The risks and benefits of the       procedure and sedation options were discussed. Questions were answered       and informed consent was obtained. Patient identification and proposed       procedure were verified. The patient was deemed in satisfactory       condition to undergo the procedure. The heart rate, respiratory rate,       oxygen saturations, blood pressure and response to sedation were       monitored throughout the procedure.       The endoscope was passed under direct vision. The Endoscope was       introduced through the mouth, and advanced to the second part of       duodenum. The physical status of the patient was re-assessed after the       procedure. The upper GI endoscopy was accomplished without difficulty.       The patient tolerated the procedure  well.  Findings:       Mucosal changes including ringed esophagus, feline appearance,       longitudinal furrows and white plaques were found in the upper third of       the esophagus and in the middle third of the esophagus. Biopsies were       taken with a cold forceps for histology. Estimated blood loss was       minimal.       Food was found in the middle third of the esophagus and in the lower       third of the esophagus. Removal was accomplished with a [place] net.       Estimated blood loss was minimal.       Patchy moderate inflammation characterized by adherent blood, congestion       (edema) and erosions was found in the stomach. Biopsies were taken with       a cold forceps for Helicobacter pylori testing. Estimated blood loss was       minimal.       A few 5 mm semi-sessile polyps with no bleeding and no stigmata of       recent bleeding were found in the gastric fundus and in the gastric       body. The polyp was removed with a cold biopsy forceps. Resection and       retrieval were complete. Estimated blood loss was minimal.       The first portion of the duodenum and second portion of the duodenum       were normal.  Impression:       - Esophageal mucosal changes suggestive of eosinophilic esophagitis.       Biopsied.       - Food was found in the esophagus. Removal was successful.       - Gastritis. Biopsied.       - A few gastric polyps. Resected and retrieved.       - Normal first portion of the duodenum and second portion of the       duodenum.  Recommendation:       - Discharge patient to home.                        - Resume previous diet.                        - Await pathology results.  Complications:        No immediate complications.  Estimated Blood Loss: Estimated blood loss was minimal.  MD Benedict Guzman,  8/20/2019 9:34:00 AM  This report has been signed electronically by the above.  Number of Addenda: 0       No Specimens removed during this procedure unless otherwise  noted.   Pt states vomiting at breakfast and does not want to eat lunch.

## 2019-12-28 NOTE — DIETITIAN INITIAL EVALUATION ADULT. - PHYSICAL APPEARANCE
other (specify)/emaciated/BMI 17.5/underweight weak, frail  no edema  no pressure injuries  +muscle wasting and fat loss as noted above

## 2019-12-28 NOTE — DIETITIAN INITIAL EVALUATION ADULT. - ETIOLOGY
related to age-related changes in appetite and po intake in setting of multiple medical comorbidities

## 2019-12-28 NOTE — PROGRESS NOTE ADULT - PROBLEM SELECTOR PLAN 5
Chronic, stable  -hx of CVA in 1990s and cerebral aneursym s/p clipping  -Continue ASA81, statin  -given advanced age and multiple comorbidities apprec pallaitive collaboration

## 2019-12-28 NOTE — PROVIDER CONTACT NOTE (CHANGE IN STATUS NOTIFICATION) - BACKGROUND
Pt given hydralazine 50mg PO during 2oclock medication administration. Blood pressure decreased initially and then increased.

## 2019-12-28 NOTE — PROGRESS NOTE ADULT - ASSESSMENT
84/F w/ PMHx of afib (coumadin), CVA and hx of aneurysm repair, HTN, HLD, mitral valve prolapse, PVD s/p stents x 3 (RLE), rheumatoid arthritis, cataracts of the L eye BIBEMS to ED for evaluation of epistaxis, which resolved without intervention. Noted to have ambulatory difficulty, weakness a/w acute on chronic anemia sec to epistaxis from coumadin req tele monitoring

## 2019-12-28 NOTE — CONSULT NOTE ADULT - SUBJECTIVE AND OBJECTIVE BOX
Patient is a 84y old  Female who presents with a chief complaint of Epistaxis (28 Dec 2019 10:35)      HPI:  84/F w/ PMHx of afib (coumadin), CVA and hx of aneurysm repair, HTN, HLD, mitral valve prolapse, PVD s/p stents x 3 (RLE), rheumatoid arthritis, cataracts of the L eye BIBEMS to ED for evaluation of epistaxis. Patient noted to have start of nose bleed 12/25 evening when changing to get into bed, denies any nose-blowing or trauma, though she believes she may have sneezed at the onset. She noted "large drops" of blood at onset, at which time she applied an icepack to her nose, and tilted her head back, noted bleeding to slow, and went to bed. When she woke on the AM of 12/26 had blood all over his hands and pillow at which time EMS was activated. Upon waking on 12/26 she noted she felt weak and had to use the walls for support to ambulate. Denies any dizziness, SOB, chest pain, vision changes. Epistaxis noted to resolve without intervention in the ED. Denies any sick contacts or recent travel.     ED Vitals: T 97.5 HR 95 /78 RR 18 SpO2 97% on room air  Labs: WBC 13.15, anemic with HB 8.1, INR 1.79  CXR: Right perihilar airspace disease, atherosclerotic aorta. No effusion.  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  Osteoarthritis  Afib  PVD (peripheral vascular disease)  Neuropathy: (Right lower leg)  H/O cerebral aneurysm repair: brain clips  CVA (cerebral vascular accident): (&quot;Mini-stroke&quot;,1990&#x27;s)  Rheumatoid arthritis  Hyperlipidemia  Hypertension  S/P cataract surgery: (Left eye)  Elective surgery: (&quot;Twisted bowel&quot;, 2014)  Elective surgery: (Exision of cyst on liver, 1985)  S/P ORIF (open reduction internal fixation) fracture: Left hip fx (2012) &amp; R hip fx (2013)  H/O cerebral aneurysm repair: Brain clips (1978(  Renal stone: Cysto stent placement 10/1/2014  PVD (peripheral vascular disease): s/p RLE bypass x 3, most recent 3/2012 Right external iliac to PT bypass w/ PTFE (2012)  S/P FRED (total abdominal hysterectomy): (1987, Hx of &quot;ovarian cancer?&quot;)      SOCIAL HISTORY:    FAMILY HISTORY:  No pertinent family history in first degree relatives      MEDICATIONS  (STANDING):  albuterol/ipratropium for Nebulization 3 milliLiter(s) Nebulizer every 8 hours  aspirin enteric coated 81 milliGRAM(s) Oral daily  atorvastatin 40 milliGRAM(s) Oral at bedtime  diltiazem    milliGRAM(s) Oral daily  hydrALAZINE 50 milliGRAM(s) Oral three times a day  metoprolol succinate  milliGRAM(s) Oral daily  multivitamin 1 Tablet(s) Oral daily  ondansetron Injectable 4 milliGRAM(s) IV Push once  pantoprazole    Tablet 40 milliGRAM(s) Oral before breakfast  senna 2 Tablet(s) Oral at bedtime    MEDICATIONS  (PRN):  melatonin 5 milliGRAM(s) Oral at bedtime PRN Insomnia      Allergies    No Known Allergies    Intolerances        Vital Signs Last 24 Hrs  T(C): 36.8 (28 Dec 2019 11:49), Max: 37.2 (27 Dec 2019 20:34)  T(F): 98.3 (28 Dec 2019 11:49), Max: 98.9 (27 Dec 2019 20:34)  HR: 75 (28 Dec 2019 11:49) (74 - 85)  BP: 149/52 (28 Dec 2019 11:49) (134/72 - 193/78)  BP(mean): --  RR: 17 (28 Dec 2019 11:49) (14 - 17)  SpO2: 96% (28 Dec 2019 11:49) (93% - 97%)    PHYSICAL EXAM  General: adult in NAD, lethargi, chronically ill appearing. no further nosebleed  HEENT: clear oropharynx, anicteric sclera, pink conjunctivae  Neck: supple  CV: normal S1S2 with no murmur rubs or gallops  Lungs: clear to auscultation, no wheezes, no rhales  Abdomen: soft non-tender non-distended, no hepato/splenomegaly  Ext: no clubbing cyanosis or edema  Skin: no rashes and no petichiae  Neuro: alert and oriented X3 no focal deficits      LABS:    CBC Full  -  ( 28 Dec 2019 07:13 )  WBC Count : 7.84 K/uL  RBC Count : 2.91 M/uL  Hemoglobin : 8.0 g/dL  Hematocrit : 25.9 %  Platelet Count - Automated : 140 K/uL  Mean Cell Volume : 89.0 fl  Mean Cell Hemoglobin : 27.5 pg  Mean Cell Hemoglobin Concentration : 30.9 gm/dL  Auto Neutrophil # : 6.42 K/uL  Auto Lymphocyte # : 0.74 K/uL  Auto Monocyte # : 0.55 K/uL  Auto Eosinophil # : 0.07 K/uL  Auto Basophil # : 0.02 K/uL  Auto Neutrophil % : 81.9 %  Auto Lymphocyte % : 9.4 %  Auto Monocyte % : 7.0 %  Auto Eosinophil % : 0.9 %  Auto Basophil % : 0.3 %    12-28    144  |  112<H>  |  42<H>  ----------------------------<  91  3.4<L>   |  25  |  1.10    Ca    8.5      28 Dec 2019 07:13  Phos  3.0     12-28  Mg     2.2     12-28    TPro  6.9  /  Alb  3.1<L>  /  TBili  1.0  /  DBili  x   /  AST  16  /  ALT  15  /  AlkPhos  61  12-28    PT/INR - ( 28 Dec 2019 07:13 )   PT: 14.5 sec;   INR: 1.27 ratio         PTT - ( 26 Dec 2019 18:39 )  PTT:36.1 sec          BLOOD SMEAR INTERPRETATION:    RADIOLOGY & ADDITIONAL STUDIES:

## 2019-12-28 NOTE — CONSULT NOTE ADULT - PROBLEM SELECTOR RECOMMENDATION 6
GOC discussed with pt and dtr MANOHAR - HCP  pt is full code  GOC discussion will remain ongoing  supportive measures  medical regimen and assist  will follow  discussed above with Nursing and Patient's Dtr

## 2019-12-28 NOTE — CHART NOTE - NSCHARTNOTEFT_GEN_A_CORE
Called by RN for hypertension in 180s systolic after administration of morning BP meds. Pt received meds in AM and 2 hours later HTN persisted into 180s systolic. Pt asymptomatic and without any complaints.        T(C): 36.7 (12-28-19 @ 09:08), Max: 37.2 (12-27-19 @ 20:34)  HR: 74 (12-28-19 @ 09:08) (74 - 85)  BP: 150/72 (12-28-19 @ 09:08) (134/72 - 193/78)  RR: 16 (12-28-19 @ 09:08) (14 - 17)  SpO2: 96% (12-28-19 @ 09:08) (93% - 97%)  Wt(kg): --    Physical :  Gen- NAD, ncat  Cardio - s+1,s+2, rrr, no murmur  Lung - cta b/l, no wheeze, no rhonchi, no rales   Abdomen- +BS, NT/ND, no guarding, no rebound, no masses  Ext- no edema, 2+ pulses b/l  Neuro- CN grossly intact, strength 5/5 b/l extrem    LABS:                        8.0    7.84  )-----------( 140      ( 28 Dec 2019 07:13 )             25.9     12-28    144  |  112<H>  |  42<H>  ----------------------------<  91  3.4<L>   |  25  |  1.10    Ca    8.5      28 Dec 2019 07:13    TPro  6.9  /  Alb  3.1<L>  /  TBili  1.0  /  DBili  x   /  AST  16  /  ALT  15  /  AlkPhos  61  12-28    PT/INR - ( 28 Dec 2019 07:13 )   PT: 14.5 sec;   INR: 1.27 ratio         PTT - ( 26 Dec 2019 18:39 )  PTT:36.1 sec            Assessment/Plan  84/F w/ PMHx of afib (coumadin), CVA and hx of aneurysm repair, HTN, HLD, mitral valve prolapse, PVD s/p stents x 3 (RLE), rheumatoid arthritis, cataracts of the L eye BIBEMS to ED for evaluation of epistaxis, which resolved without intervention. Noted to have ambulatory difficulty, weakness a/w acute on chronic anemia sec to epistaxis from coumadin req tele monitoring  1. Hydralazine 10mg IV push administered - BP followed and improved to 150s and 130s systolic; Discussed with Dr. Lucas who is following and agrees with plan

## 2019-12-28 NOTE — CONSULT NOTE ADULT - SUBJECTIVE AND OBJECTIVE BOX
[facility] - Dahiana Bañuelos, Evens, Toney, Terrie, Filiberto Laughlin  Office Number: [phone]    Initial Consult Note    CHIEF COMPLAINT: Patient is a 84y old  Female who presents with a chief complaint of Epistaxis        HPI:  84/F w/ PMHx of afib (coumadin), CVA and hx of aneurysm repair, HTN, HLD, mitral valve prolapse, PVD s/p stents x 3 (RLE), rheumatoid arthritis, cataracts of the L eye BIBEMS to ED for evaluation of epistaxis. Patient noted to have start of nose bleed 12/25 evening when changing to get into bed, denies any nose-blowing or trauma, though she believes she may have sneezed at the onset. She noted "large drops" of blood at onset, at which time she applied an icepack to her nose, and tilted her head back, noted bleeding to slow, and went to bed. When she woke on the AM of 12/26 had blood all over his hands and pillow at which time EMS was activated. Upon waking on 12/26 she noted she felt weak and had to use the walls for support to ambulate. Denies any dizziness, SOB, chest pain, vision changes. Epistaxis noted to resolve without intervention in the ED. Denies any sick contacts or recent travel.     ED Vitals: T 97.5 HR 95 /78 RR 18 SpO2 97% on room air  Labs: WBC 13.15, anemic with HB 8.1, INR 1.79  CXR: Right perihilar airspace disease, atherosclerotic aorta. No effusion.  EKG: Pending at the time of admission  In the ED Patient received tylenol 650mg x 1     She has more recently in the hospital been found to have uncontrolled hypertension and has required IV hydralazine to help control her BP.  On my interview, she is complaining of generalized weakness.  She is not reporting chest pain, increased dyspnea, PND, orthopnea, LE swelling, dizziness, or syncope.       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  Osteoarthritis  Afib  PVD (peripheral vascular disease)  Neuropathy: (Right lower leg)  H/O cerebral aneurysm repair: brain clips  CVA (cerebral vascular accident): (&quot;Mini-stroke&quot;,1990&#x27;s)  Rheumatoid arthritis  Hyperlipidemia  Hypertension  S/P cataract surgery: (Left eye)  Elective surgery: (&quot;Twisted bowel&quot;, 2014)  Elective surgery: (Exision of cyst on liver, 1985)  S/P ORIF (open reduction internal fixation) fracture: Left hip fx (2012) &amp; R hip fx (2013)  H/O cerebral aneurysm repair: Brain clips (1978(  Renal stone: Cysto stent placement 10/1/2014  PVD (peripheral vascular disease): s/p RLE bypass x 3, most recent 3/2012 Right external iliac to PT bypass w/ PTFE (2012)  S/P FRED (total abdominal hysterectomy): (1987, Hx of &quot;ovarian cancer?&quot;)      SOCIAL HISTORY:  No tobacco, ethanol, or drug abuse.    FAMILY HISTORY:  No pertinent family history in first degree relatives  No family history of acute MI or sudden cardiac death.    MEDICATIONS  (STANDING):  albuterol/ipratropium for Nebulization 3 milliLiter(s) Nebulizer every 8 hours  aspirin enteric coated 81 milliGRAM(s) Oral daily  atorvastatin 40 milliGRAM(s) Oral at bedtime  diltiazem    milliGRAM(s) Oral daily  hydrALAZINE 25 milliGRAM(s) Oral three times a day  metoprolol succinate  milliGRAM(s) Oral daily  multivitamin 1 Tablet(s) Oral daily  pantoprazole    Tablet 40 milliGRAM(s) Oral before breakfast  senna 2 Tablet(s) Oral at bedtime    MEDICATIONS  (PRN):  melatonin 5 milliGRAM(s) Oral at bedtime PRN Insomnia      Allergies    No Known Allergies          REVIEW OF SYSTEMS:    All other review of systems is negative unless indicated above    VITAL SIGNS:   Vital Signs Last 24 Hrs  T(C): 36.7 (28 Dec 2019 09:08), Max: 37.2 (27 Dec 2019 20:34)  T(F): 98.1 (28 Dec 2019 09:08), Max: 98.9 (27 Dec 2019 20:34)  HR: 74 (28 Dec 2019 09:08) (74 - 85)  BP: 150/72 (28 Dec 2019 09:08) (134/72 - 193/78)  BP(mean): --  RR: 16 (28 Dec 2019 09:08) (14 - 17)  SpO2: 96% (28 Dec 2019 09:08) (93% - 97%)    I&O's Summary    27 Dec 2019 07:01  -  28 Dec 2019 07:00  --------------------------------------------------------  IN: 0 mL / OUT: 200 mL / NET: -200 mL        On Exam:    Constitutional: NAD, alert and oriented x 3.  Weak and lethargic  Lungs:  Non-labored, breath sounds are clear bilaterally, No wheezing, rales or rhonchi  Cardiovascular: RRR.  S1 and S2 positive.  1/6 systolic murmur  Gastrointestinal: Bowel Sounds present, soft, nontender.   Lymph: No peripheral edema. No cervical lymphadenopathy.  Neurological: Alert, no focal deficits  Skin: No rashes or ulcers   Psych:  Mood & affect appropriate.    LABS: All Labs Reviewed:                        8.0    7.84  )-----------( 140      ( 28 Dec 2019 07:13 )             25.9                         8.1    13.15 )-----------( 126      ( 26 Dec 2019 18:39 )             25.5     28 Dec 2019 07:13    144    |  112    |  42     ----------------------------<  91     3.4     |  25     |  1.10   27 Dec 2019 16:42    146    |  114    |  50     ----------------------------<  111    3.7     |  27     |  1.30     Ca    8.5        28 Dec 2019 07:13  Ca    8.4        27 Dec 2019 16:42  Phos  3.0       28 Dec 2019 07:13  Mg     2.2       28 Dec 2019 07:13    TPro  6.9    /  Alb  3.1    /  TBili  1.0    /  DBili  x      /  AST  16     /  ALT  15     /  AlkPhos  61     28 Dec 2019 07:13  TPro  6.3    /  Alb  2.9    /  TBili  0.9    /  DBili  x      /  AST  19     /  ALT  14     /  AlkPhos  57     27 Dec 2019 16:42    PT/INR - ( 28 Dec 2019 07:13 )   PT: 14.5 sec;   INR: 1.27 ratio         PTT - ( 26 Dec 2019 18:39 )  PTT:36.1 sec      Blood Culture:   12-28 @ 07:13  Pro Bnp [id number]    12-28 @ 07:13  TSH: 1.54      RADIOLOGY:      EKG:  Normal sinus rhythm.  Poor R wave progression

## 2019-12-28 NOTE — CONSULT NOTE ADULT - ASSESSMENT
Anemia probably multfactorial in etiology.  Has had an acute nosebleed that appears to have resolved with stable hgb approx. however on review of labs it is noted her MCV has decreased since 6/2019 to rule out fe deficiency  Lethargy of ?etiology being evaluated    Recommendations:  1.  ENT evaluation if negative to have coumadin resumed as per cardiology  2.  follow CBC and transfuse as indicated  3.  to evaluate fe stores for fe deficiency  4.  having medical evaluation of current symptoms  further heme evaluation pending above  discussed with patient and Dr. Lucas

## 2019-12-29 DIAGNOSIS — J69.0 PNEUMONITIS DUE TO INHALATION OF FOOD AND VOMIT: ICD-10-CM

## 2019-12-29 DIAGNOSIS — R10.9 UNSPECIFIED ABDOMINAL PAIN: ICD-10-CM

## 2019-12-29 LAB
ALBUMIN SERPL ELPH-MCNC: 3.2 G/DL — LOW (ref 3.3–5)
ALP SERPL-CCNC: 69 U/L — SIGNIFICANT CHANGE UP (ref 40–120)
ALT FLD-CCNC: 15 U/L — SIGNIFICANT CHANGE UP (ref 12–78)
ANION GAP SERPL CALC-SCNC: 9 MMOL/L — SIGNIFICANT CHANGE UP (ref 5–17)
AST SERPL-CCNC: 18 U/L — SIGNIFICANT CHANGE UP (ref 15–37)
BASOPHILS # BLD AUTO: 0 K/UL — SIGNIFICANT CHANGE UP (ref 0–0.2)
BASOPHILS NFR BLD AUTO: 0 % — SIGNIFICANT CHANGE UP (ref 0–2)
BILIRUB SERPL-MCNC: 1.1 MG/DL — SIGNIFICANT CHANGE UP (ref 0.2–1.2)
BUN SERPL-MCNC: 41 MG/DL — HIGH (ref 7–23)
CALCIUM SERPL-MCNC: 8.8 MG/DL — SIGNIFICANT CHANGE UP (ref 8.5–10.1)
CHLORIDE SERPL-SCNC: 111 MMOL/L — HIGH (ref 96–108)
CO2 SERPL-SCNC: 26 MMOL/L — SIGNIFICANT CHANGE UP (ref 22–31)
CREAT SERPL-MCNC: 1.2 MG/DL — SIGNIFICANT CHANGE UP (ref 0.5–1.3)
EOSINOPHIL # BLD AUTO: 0 K/UL — SIGNIFICANT CHANGE UP (ref 0–0.5)
EOSINOPHIL NFR BLD AUTO: 0 % — SIGNIFICANT CHANGE UP (ref 0–6)
GLUCOSE SERPL-MCNC: 121 MG/DL — HIGH (ref 70–99)
HCT VFR BLD CALC: 25.5 % — LOW (ref 34.5–45)
HCT VFR BLD CALC: 30.8 % — LOW (ref 34.5–45)
HGB BLD-MCNC: 10 G/DL — LOW (ref 11.5–15.5)
HGB BLD-MCNC: 7.9 G/DL — LOW (ref 11.5–15.5)
IMM GRANULOCYTES NFR BLD AUTO: 0.4 % — SIGNIFICANT CHANGE UP (ref 0–1.5)
INR BLD: 1.13 RATIO — SIGNIFICANT CHANGE UP (ref 0.88–1.16)
LYMPHOCYTES # BLD AUTO: 0.7 K/UL — LOW (ref 1–3.3)
LYMPHOCYTES # BLD AUTO: 8.4 % — LOW (ref 13–44)
MCHC RBC-ENTMCNC: 27.3 PG — SIGNIFICANT CHANGE UP (ref 27–34)
MCHC RBC-ENTMCNC: 27.9 PG — SIGNIFICANT CHANGE UP (ref 27–34)
MCHC RBC-ENTMCNC: 31 GM/DL — LOW (ref 32–36)
MCHC RBC-ENTMCNC: 32.5 GM/DL — SIGNIFICANT CHANGE UP (ref 32–36)
MCV RBC AUTO: 86 FL — SIGNIFICANT CHANGE UP (ref 80–100)
MCV RBC AUTO: 88.2 FL — SIGNIFICANT CHANGE UP (ref 80–100)
MONOCYTES # BLD AUTO: 0.63 K/UL — SIGNIFICANT CHANGE UP (ref 0–0.9)
MONOCYTES NFR BLD AUTO: 7.6 % — SIGNIFICANT CHANGE UP (ref 2–14)
NEUTROPHILS # BLD AUTO: 6.96 K/UL — SIGNIFICANT CHANGE UP (ref 1.8–7.4)
NEUTROPHILS NFR BLD AUTO: 83.6 % — HIGH (ref 43–77)
NRBC # BLD: 0 /100 WBCS — SIGNIFICANT CHANGE UP (ref 0–0)
NRBC # BLD: 0 /100 WBCS — SIGNIFICANT CHANGE UP (ref 0–0)
PLATELET # BLD AUTO: 178 K/UL — SIGNIFICANT CHANGE UP (ref 150–400)
PLATELET # BLD AUTO: 189 K/UL — SIGNIFICANT CHANGE UP (ref 150–400)
POTASSIUM SERPL-MCNC: 3.9 MMOL/L — SIGNIFICANT CHANGE UP (ref 3.5–5.3)
POTASSIUM SERPL-SCNC: 3.9 MMOL/L — SIGNIFICANT CHANGE UP (ref 3.5–5.3)
PROCALCITONIN SERPL-MCNC: 3.58 NG/ML — HIGH (ref 0–0.04)
PROT SERPL-MCNC: 7 G/DL — SIGNIFICANT CHANGE UP (ref 6–8.3)
PROTHROM AB SERPL-ACNC: 12.9 SEC — SIGNIFICANT CHANGE UP (ref 10–12.9)
RBC # BLD: 2.89 M/UL — LOW (ref 3.8–5.2)
RBC # BLD: 3.58 M/UL — LOW (ref 3.8–5.2)
RBC # FLD: 16.2 % — HIGH (ref 10.3–14.5)
RBC # FLD: 16.3 % — HIGH (ref 10.3–14.5)
SODIUM SERPL-SCNC: 146 MMOL/L — HIGH (ref 135–145)
WBC # BLD: 10.27 K/UL — SIGNIFICANT CHANGE UP (ref 3.8–10.5)
WBC # BLD: 8.32 K/UL — SIGNIFICANT CHANGE UP (ref 3.8–10.5)
WBC # FLD AUTO: 10.27 K/UL — SIGNIFICANT CHANGE UP (ref 3.8–10.5)
WBC # FLD AUTO: 8.32 K/UL — SIGNIFICANT CHANGE UP (ref 3.8–10.5)

## 2019-12-29 PROCEDURE — 99232 SBSQ HOSP IP/OBS MODERATE 35: CPT

## 2019-12-29 PROCEDURE — 99233 SBSQ HOSP IP/OBS HIGH 50: CPT

## 2019-12-29 PROCEDURE — 74018 RADEX ABDOMEN 1 VIEW: CPT | Mod: 26

## 2019-12-29 RX ORDER — PANTOPRAZOLE SODIUM 20 MG/1
40 TABLET, DELAYED RELEASE ORAL
Refills: 0 | Status: DISCONTINUED | OUTPATIENT
Start: 2019-12-29 | End: 2020-01-04

## 2019-12-29 RX ORDER — HYDRALAZINE HCL 50 MG
50 TABLET ORAL EVERY 6 HOURS
Refills: 0 | Status: DISCONTINUED | OUTPATIENT
Start: 2019-12-29 | End: 2019-12-30

## 2019-12-29 RX ORDER — WARFARIN SODIUM 2.5 MG/1
5 TABLET ORAL ONCE
Refills: 0 | Status: COMPLETED | OUTPATIENT
Start: 2019-12-29 | End: 2019-12-29

## 2019-12-29 RX ORDER — SUCRALFATE 1 G
1 TABLET ORAL
Refills: 0 | Status: DISCONTINUED | OUTPATIENT
Start: 2019-12-29 | End: 2020-01-04

## 2019-12-29 RX ORDER — LIDOCAINE 4 G/100G
1 CREAM TOPICAL DAILY
Refills: 0 | Status: DISCONTINUED | OUTPATIENT
Start: 2019-12-29 | End: 2020-01-04

## 2019-12-29 RX ORDER — SODIUM CHLORIDE 9 MG/ML
1000 INJECTION, SOLUTION INTRAVENOUS
Refills: 0 | Status: DISCONTINUED | OUTPATIENT
Start: 2019-12-29 | End: 2020-01-01

## 2019-12-29 RX ADMIN — Medication 3 MILLILITER(S): at 00:25

## 2019-12-29 RX ADMIN — PANTOPRAZOLE SODIUM 40 MILLIGRAM(S): 20 TABLET, DELAYED RELEASE ORAL at 06:33

## 2019-12-29 RX ADMIN — PIPERACILLIN AND TAZOBACTAM 25 GRAM(S): 4; .5 INJECTION, POWDER, LYOPHILIZED, FOR SOLUTION INTRAVENOUS at 23:59

## 2019-12-29 RX ADMIN — Medication 1 SPRAY(S): at 11:55

## 2019-12-29 RX ADMIN — PIPERACILLIN AND TAZOBACTAM 25 GRAM(S): 4; .5 INJECTION, POWDER, LYOPHILIZED, FOR SOLUTION INTRAVENOUS at 17:19

## 2019-12-29 RX ADMIN — PANTOPRAZOLE SODIUM 40 MILLIGRAM(S): 20 TABLET, DELAYED RELEASE ORAL at 17:33

## 2019-12-29 RX ADMIN — Medication 50 MILLIGRAM(S): at 23:59

## 2019-12-29 RX ADMIN — Medication 1 SPRAY(S): at 23:59

## 2019-12-29 RX ADMIN — Medication 3 MILLILITER(S): at 08:29

## 2019-12-29 RX ADMIN — PIPERACILLIN AND TAZOBACTAM 25 GRAM(S): 4; .5 INJECTION, POWDER, LYOPHILIZED, FOR SOLUTION INTRAVENOUS at 08:30

## 2019-12-29 RX ADMIN — Medication 1 SPRAY(S): at 00:55

## 2019-12-29 RX ADMIN — Medication 50 MILLIGRAM(S): at 17:34

## 2019-12-29 RX ADMIN — SODIUM CHLORIDE 50 MILLILITER(S): 9 INJECTION, SOLUTION INTRAVENOUS at 17:18

## 2019-12-29 RX ADMIN — Medication 1 APPLICATION(S): at 06:33

## 2019-12-29 RX ADMIN — Medication 1 SPRAY(S): at 06:33

## 2019-12-29 RX ADMIN — PIPERACILLIN AND TAZOBACTAM 25 GRAM(S): 4; .5 INJECTION, POWDER, LYOPHILIZED, FOR SOLUTION INTRAVENOUS at 01:00

## 2019-12-29 RX ADMIN — Medication 1 TABLET(S): at 11:55

## 2019-12-29 RX ADMIN — Medication 81 MILLIGRAM(S): at 11:55

## 2019-12-29 RX ADMIN — ATORVASTATIN CALCIUM 40 MILLIGRAM(S): 80 TABLET, FILM COATED ORAL at 22:03

## 2019-12-29 RX ADMIN — Medication 50 MILLIGRAM(S): at 11:55

## 2019-12-29 RX ADMIN — Medication 1 APPLICATION(S): at 22:03

## 2019-12-29 RX ADMIN — Medication 150 MILLIGRAM(S): at 06:33

## 2019-12-29 RX ADMIN — Medication 3 MILLILITER(S): at 14:44

## 2019-12-29 RX ADMIN — Medication 1 SPRAY(S): at 17:34

## 2019-12-29 RX ADMIN — Medication 50 MILLIGRAM(S): at 06:34

## 2019-12-29 RX ADMIN — LIDOCAINE 1 PATCH: 4 CREAM TOPICAL at 23:40

## 2019-12-29 RX ADMIN — WARFARIN SODIUM 5 MILLIGRAM(S): 2.5 TABLET ORAL at 22:03

## 2019-12-29 RX ADMIN — Medication 10 MILLIGRAM(S): at 20:10

## 2019-12-29 RX ADMIN — Medication 1 APPLICATION(S): at 14:41

## 2019-12-29 RX ADMIN — SENNA PLUS 2 TABLET(S): 8.6 TABLET ORAL at 22:03

## 2019-12-29 RX ADMIN — Medication 1 GRAM(S): at 17:33

## 2019-12-29 RX ADMIN — Medication 120 MILLIGRAM(S): at 06:33

## 2019-12-29 RX ADMIN — Medication 3 MILLILITER(S): at 23:04

## 2019-12-29 NOTE — PROGRESS NOTE ADULT - PROBLEM SELECTOR PLAN 1
stable  h/h slightly lower today 7.9  apprec ENT recs  apprec hemat recs  transfuse 1 unit prbc  cont coumadin as no active bleed and risks vs benefits for afib, daughter and patient agree  monitor inr

## 2019-12-29 NOTE — PROGRESS NOTE ADULT - ASSESSMENT
Anemia probably multfactorial in etiology.  Has had an acute nosebleed that appears to have resolved with stable hgb approx. however on review of labs it is noted her MCV has decreased but ferritin is approx 100  Lethargy of ?etiology being evaluated and is slowly improving improving    Recommendations:  1. transfuse 1 unit PRBC  2. coumadin as per cardiology  3. follow CBC  discussed with patient and Dr. Lucas

## 2019-12-29 NOTE — PROGRESS NOTE ADULT - PROBLEM SELECTOR PLAN 4
Chronic, stable  --cont ac warfarin  -trend inr to goal 2-3  no bridginig required as per cardio nor hemat

## 2019-12-29 NOTE — PROGRESS NOTE ADULT - ASSESSMENT
Isabel Quintero DNP, NP-C  Cardiology   Spectra #[phone] 84/F w/ PMHx of afib (coumadin), CVA and hx of aneurysm repair, HTN, HLD, mitral valve prolapse, PVD s/p stents x 3 (RLE), rheumatoid arthritis, cataracts of the L eye BIBEMS to ED for evaluation of epistaxis.    Uncontrolled HTN/Hypertensive Emergenncy  - BP is uncontrolled with periods of hypertensive urgency overnight.  Continue IV hydralazine prn systolic blood pressure greater than 160/90.  Had an episode of epistaxis but no further  - Her BP is improved, though, still suboptimal.  Increase Hydralazine to 50 q6H   - No evidence of active ischemia or volume overload    Atrial fibrillation  - She is currently in NSR  - She is now being dose with Coumadin.  Her H/H are stable.  Will monitor closely for further bleeding  - Continue to dose daily to keep INR 2-3  - Continue diltiazem 120 QD and Toprol 150 QD  - Monitor and replete lytes.  Replete K>4 and Mag>2    Hx of CVA/PVD  s/p stent  - Continue aspirin 81 QD  - Continue statin drug    To follow closely with you while admitted    Isabel Quintero DNP, NP-C  Cardiology   Spectra #[phone]

## 2019-12-29 NOTE — PROGRESS NOTE ADULT - SUBJECTIVE AND OBJECTIVE BOX
[facility] -- Dahiana Bañuelos, Toney Horner, Truman Fay Savella, Goodger  Office # [phone]    Follow Up:      Subjective/Observations:     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  Osteoarthritis  Afib  PVD (peripheral vascular disease)  Neuropathy: (Right lower leg)  H/O cerebral aneurysm repair: brain clips  CVA (cerebral vascular accident): (&quot;Mini-stroke&quot;,1990&#x27;s)  Rheumatoid arthritis  Hyperlipidemia  Hypertension  S/P cataract surgery: (Left eye)  Elective surgery: (&quot;Twisted bowel&quot;, 2014)  Elective surgery: (Exision of cyst on liver, 1985)  S/P ORIF (open reduction internal fixation) fracture: Left hip fx (2012) &amp; R hip fx (2013)  H/O cerebral aneurysm repair: Brain clips (1978(  Renal stone: Cysto stent placement 10/1/2014  PVD (peripheral vascular disease): s/p RLE bypass x 3, most recent 3/2012 Right external iliac to PT bypass w/ PTFE (2012)  S/P FRED (total abdominal hysterectomy): (1987, Hx of &quot;ovarian cancer?&quot;)    MEDICATIONS  (STANDING):  albuterol/ipratropium for Nebulization 3 milliLiter(s) Nebulizer every 8 hours  aspirin enteric coated 81 milliGRAM(s) Oral daily  atorvastatin 40 milliGRAM(s) Oral at bedtime  BACItracin   Ointment 1 Application(s) Topical three times a day  diltiazem    milliGRAM(s) Oral daily  hydrALAZINE 50 milliGRAM(s) Oral three times a day  metoprolol succinate  milliGRAM(s) Oral daily  multivitamin 1 Tablet(s) Oral daily  pantoprazole    Tablet 40 milliGRAM(s) Oral before breakfast  piperacillin/tazobactam IVPB.. 3.375 Gram(s) IV Intermittent every 8 hours  senna 2 Tablet(s) Oral at bedtime  sodium chloride 0.65% Nasal 1 Spray(s) Both Nostrils four times a day  warfarin 5 milliGRAM(s) Oral once    MEDICATIONS  (PRN):  hydrALAZINE Injectable 10 milliGRAM(s) IV Push once PRN hypertension > 180 systolic  melatonin 5 milliGRAM(s) Oral at bedtime PRN Insomnia    Allergies    No Known Allergies    Intolerances      Vital Signs Last 24 Hrs  T(C): 36.9 (29 Dec 2019 04:23), Max: 37 (28 Dec 2019 08:53)  T(F): 98.4 (29 Dec 2019 04:23), Max: 98.6 (28 Dec 2019 08:53)  HR: 73 (29 Dec 2019 04:23) (69 - 100)  BP: 158/71 (29 Dec 2019 04:23) (134/72 - 175/98)  BP(mean): --  RR: 16 (29 Dec 2019 04:23) (14 - 17)  SpO2: 97% (29 Dec 2019 04:23) (93% - 97%)  I&O's Summary    28 Dec 2019 07:01  -  29 Dec 2019 07:00  --------------------------------------------------------  IN: 100 mL / OUT: 0 mL / NET: 100 mL        PHYSICAL EXAM:  TELE:   Constitutional: NAD, awake and alert, well-developed  HEENT: Moist Mucous Membranes, Anicteric  Pulmonary: Non-labored, breath sounds are clear bilaterally, No wheezing, rales or rhonchi  Cardiovascular: Regular, S1 and S2, No murmurs, rubs, gallops or clicks  Gastrointestinal: Bowel Sounds present, soft, nontender.   Lymph: No peripheral edema. No lymphadenopathy.  Skin: No visible rashes or ulcers.  Psych:  Mood & affect appropriate  LABS: All Labs Reviewed:                        7.9    8.32  )-----------( 178      ( 29 Dec 2019 07:03 )             25.5                         8.0    7.84  )-----------( 140      ( 28 Dec 2019 07:13 )             25.9                         8.1    13.15 )-----------( 126      ( 26 Dec 2019 18:39 )             25.5     29 Dec 2019 07:03    146    |  111    |  41     ----------------------------<  121    3.9     |  26     |  1.20   28 Dec 2019 07:13    144    |  112    |  42     ----------------------------<  91     3.4     |  25     |  1.10   27 Dec 2019 16:42    146    |  114    |  50     ----------------------------<  111    3.7     |  27     |  1.30     Ca    8.8        29 Dec 2019 07:03  Ca    8.5        28 Dec 2019 07:13  Ca    8.4        27 Dec 2019 16:42  Phos  3.0       28 Dec 2019 07:13  Mg     2.2       28 Dec 2019 07:13    TPro  7.0    /  Alb  3.2    /  TBili  1.1    /  DBili  x      /  AST  18     /  ALT  15     /  AlkPhos  69     29 Dec 2019 07:03  TPro  6.9    /  Alb  3.1    /  TBili  1.0    /  DBili  x      /  AST  16     /  ALT  15     /  AlkPhos  61     28 Dec 2019 07:13  TPro  6.3    /  Alb  2.9    /  TBili  0.9    /  DBili  x      /  AST  19     /  ALT  14     /  AlkPhos  57     27 Dec 2019 16:42    PT/INR - ( 29 Dec 2019 07:03 )   PT: 12.9 sec;   INR: 1.13 ratio      < from: TTE Echo Doppler w/o Cont (06.10.19 @ 14:06) >     EXAM:  ECHO TTE WO CON COMP W DOPPLR         PROCEDURE DATE:  06/10/2019        INTERPRETATION:  INDICATION: NSTEMI    Blood Pressure 157/82    Height 154.9 cm     Weight 52.3 kg       BSA   1.49 sq m    Dimensions:    LA 3.6       Normal Values: 2.0 - 4.0 cm    Ao 2.9        Normal Values: 2.0 - 3.8 cm  SEPTUM 1.0       Normal Values: 0.6 - 1.2 cm  PWT 0.9       Normal Values: 0.6 - 1.1 cm  LVIDd 4.2         Normal Values: 3.0 - 5.6 cm  LVIDs 3.4         Normal Values: 1.8 - 4.0 cm    OBSERVATIONS:  Technically difficult study  Mitral Valve: Thickened leaflets, moderate MR.  Aortic Valve/Aorta: Sclerotic trileaflet aortic valve. Mild AI  Tricuspid Valve: normal with mild TR.  Pulmonic Valve: Mild PI  Left Atrium: Enlarged  Right Atrium:normal  Left Ventricle: Endocardium not well-visualized, estimated LVEF of   45-50%. Cannot rule out segmental dysfunction  Right Ventricle: Not well-visualized  Pericardium/Pleura: normal, no significant pericardial effusion.  Pulmonary/RV Pressure:estimated PA systolic pressure of 43 mmHg assuming   an RA pressure of 10 mmHg.  LV diastolic dysfunction is present  Small right-sided pleural effusion    Conclusion:   Technically difficult study  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 Cannot rule out segmental dysfunction  Left atrial enlargement  Sclerotic trileaflet aortic valve. Mild AI  Thickened mitral valve leaflets with moderate MR  Mild TR  Small right-sided pleural effusion      JACQUELINE GOODEN   This document has been electronically signed. Jun 11 2019  8:16AM     < end of copied text >    < from: 12 Lead ECG (12.27.19 @ 16:04) >    Ventricular Rate 87 BPM    Atrial Rate 87 BPM    P-R Interval 166 ms    QRS Duration 98 ms    Q-T Interval 410 ms    QTC Calculation(Bezet) 493 ms    P Axis 45 degrees    R Axis -34 degrees    T Axis 2 degrees    Diagnosis Line Normal sinus rhythm  Left axis deviation  Minimal voltage criteria for LVH, may be normal variant    Confirmed by ANNY FAY ([id number]) on 12/28/2019 10:55:47 AM    < end of copied text >    < from: CT Angio Chest w/ IV Cont (12.28.19 @ 13:25) >    EXAM:  CT ANGIO CHEST (W)AW IC                            PROCEDURE DATE:  12/28/2019          INTERPRETATION:  CT ANGIO CHEST WITHOUT AND OR WITH IV CONTRAST    CLINICAL INFORMATION:  short of breath, atrial fibrillation    TECHNIQUE: Contrast enhanced CT pulmonary angiogram was performed. Multiplanar CT and HRCT images were reviewed. Maximum intensity projection (MIP) images are reconstructed as per CT angiography protocol. Images were acquired during the administration of 61 cc Omnipaque 350IV contrast. This study was performed using automatic exposure control (radiation dose reduction software) to obtain a diagnostic image quality scan with patient dose as low as reasonably achievable.    COMPARISON: same day CXR, CTA chest 6/8/2019    PULMONARY ARTERIES: No pulmonary embolism.    LUNGS, AIRWAYS: The central airways are patent. There is emphysema. Bibasilar mild fibrosis. No focal consolidation. Right midlung groundglass opacity.    PLEURA: No pleural abnormality.    VESSELS: Aortic atherosclerosis without aneurysm. No dissection.    HEART: Normal heart size. No pericardial effusion. Coronary artery calcifications are present.    MEDIASTINUM, ALEN, AXILLAE: No adenopathy.    UPPER ABDOMEN: Gallstones and renal stones. 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    BONES AND CHEST WALL: No acute bony abnormality.    IMPRESSION:     No pulmonary embolism.    Right midlung groundglass opacity. Findings may represent acute infection or inflammation versus progression of pulmonary fibrosis.    GRAZYNA GAGNON M.D., ATTENDING RADIOLOGIST  This document has been electronically signed. Dec 28 2019  2:02PM      < end of copied text > [facility] -- Dahiana Bañuelos, Toney Horner, Truman Fay Savella, Goodger  Office # [phone]    Follow Up:  Uncontrolled HTN    Subjective/Observations: Awake, c/o being weak.  Denies dizziness or lightheadedness.  Denies any respiratory or cardiac discomfort    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  Osteoarthritis  Afib  PVD (peripheral vascular disease)  Neuropathy: (Right lower leg)  H/O cerebral aneurysm repair: brain clips  CVA (cerebral vascular accident): (&quot;Mini-stroke&quot;,1990&#x27;s)  Rheumatoid arthritis  Hyperlipidemia  Hypertension  S/P cataract surgery: (Left eye)  Elective surgery: (&quot;Twisted bowel&quot;, 2014)  Elective surgery: (Exision of cyst on liver, 1985)  S/P ORIF (open reduction internal fixation) fracture: Left hip fx (2012) &amp; R hip fx (2013)  H/O cerebral aneurysm repair: Brain clips (1978(  Renal stone: Cysto stent placement 10/1/2014  PVD (peripheral vascular disease): s/p RLE bypass x 3, most recent 3/2012 Right external iliac to PT bypass w/ PTFE (2012)  S/P FRED (total abdominal hysterectomy): (1987, Hx of &quot;ovarian cancer?&quot;)    MEDICATIONS  (STANDING):  albuterol/ipratropium for Nebulization 3 milliLiter(s) Nebulizer every 8 hours  aspirin enteric coated 81 milliGRAM(s) Oral daily  atorvastatin 40 milliGRAM(s) Oral at bedtime  BACItracin   Ointment 1 Application(s) Topical three times a day  diltiazem    milliGRAM(s) Oral daily  hydrALAZINE 50 milliGRAM(s) Oral three times a day  metoprolol succinate  milliGRAM(s) Oral daily  multivitamin 1 Tablet(s) Oral daily  pantoprazole    Tablet 40 milliGRAM(s) Oral before breakfast  piperacillin/tazobactam IVPB.. 3.375 Gram(s) IV Intermittent every 8 hours  senna 2 Tablet(s) Oral at bedtime  sodium chloride 0.65% Nasal 1 Spray(s) Both Nostrils four times a day  warfarin 5 milliGRAM(s) Oral once    MEDICATIONS  (PRN):  hydrALAZINE Injectable 10 milliGRAM(s) IV Push once PRN hypertension > 180 systolic  melatonin 5 milliGRAM(s) Oral at bedtime PRN Insomnia    Allergies    No Known Allergies    Intolerances    Vital Signs Last 24 Hrs  T(C): 36.9 (29 Dec 2019 04:23), Max: 37 (28 Dec 2019 08:53)  T(F): 98.4 (29 Dec 2019 04:23), Max: 98.6 (28 Dec 2019 08:53)  HR: 73 (29 Dec 2019 04:23) (69 - 100)  BP: 158/71 (29 Dec 2019 04:23) (134/72 - 175/98)  BP(mean): --  RR: 16 (29 Dec 2019 04:23) (14 - 17)  SpO2: 97% (29 Dec 2019 04:23) (93% - 97%)  I&O's Summary    28 Dec 2019 07:01  -  29 Dec 2019 07:00  --------------------------------------------------------  IN: 100 mL / OUT: 0 mL / NET: 100 mL    PHYSICAL EXAM:  TELE: NSR  Constitutional: NAD, awake, well-developed  HEENT: Moist Mucous Membranes, Anicteric  Pulmonary: Non-labored, breath sounds are  bilaterally, No wheezing, rales or rhonchi  Cardiovascular: Regular, S1 and S2, + murmurs.  No rubs, gallops or clicks  Gastrointestinal: Bowel Sounds present, soft, nontender.   Lymph: No peripheral edema. No lymphadenopathy.  Skin: No visible rashes or ulcers.  Psych:  Mood & affect flat  LABS: All Labs Reviewed:                        7.9    8.32  )-----------( 178      ( 29 Dec 2019 07:03 )             25.5                         8.0    7.84  )-----------( 140      ( 28 Dec 2019 07:13 )             25.9                         8.1    13.15 )-----------( 126      ( 26 Dec 2019 18:39 )             25.5     29 Dec 2019 07:03    146    |  111    |  41     ----------------------------<  121    3.9     |  26     |  1.20   28 Dec 2019 07:13    144    |  112    |  42     ----------------------------<  91     3.4     |  25     |  1.10   27 Dec 2019 16:42    146    |  114    |  50     ----------------------------<  111    3.7     |  27     |  1.30     Ca    8.8        29 Dec 2019 07:03  Ca    8.5        28 Dec 2019 07:13  Ca    8.4        27 Dec 2019 16:42  Phos  3.0       28 Dec 2019 07:13  Mg     2.2       28 Dec 2019 07:13    TPro  7.0    /  Alb  3.2    /  TBili  1.1    /  DBili  x      /  AST  18     /  ALT  15     /  AlkPhos  69     29 Dec 2019 07:03  TPro  6.9    /  Alb  3.1    /  TBili  1.0    /  DBili  x      /  AST  16     /  ALT  15     /  AlkPhos  61     28 Dec 2019 07:13  TPro  6.3    /  Alb  2.9    /  TBili  0.9    /  DBili  x      /  AST  19     /  ALT  14     /  AlkPhos  57     27 Dec 2019 16:42    PT/INR - ( 29 Dec 2019 07:03 )   PT: 12.9 sec;   INR: 1.13 ratio      < from: TTE Echo Doppler w/o Cont (06.10.19 @ 14:06) >     EXAM:  ECHO TTE WO CON COMP W DOPPLR         PROCEDURE DATE:  06/10/2019        INTERPRETATION:  INDICATION: NSTEMI    Blood Pressure 157/82    Height 154.9 cm     Weight 52.3 kg       BSA   1.49 sq m    Dimensions:    LA 3.6       Normal Values: 2.0 - 4.0 cm    Ao 2.9        Normal Values: 2.0 - 3.8 cm  SEPTUM 1.0       Normal Values: 0.6 - 1.2 cm  PWT 0.9       Normal Values: 0.6 - 1.1 cm  LVIDd 4.2         Normal Values: 3.0 - 5.6 cm  LVIDs 3.4         Normal Values: 1.8 - 4.0 cm    OBSERVATIONS:  Technically difficult study  Mitral Valve: Thickened leaflets, moderate MR.  Aortic Valve/Aorta: Sclerotic trileaflet aortic valve. Mild AI  Tricuspid Valve: normal with mild TR.  Pulmonic Valve: Mild PI  Left Atrium: Enlarged  Right Atrium:normal  Left Ventricle: Endocardium not well-visualized, estimated LVEF of   45-50%. Cannot rule out segmental dysfunction  Right Ventricle: Not well-visualized  Pericardium/Pleura: normal, no significant pericardial effusion.  Pulmonary/RV Pressure:estimated PA systolic pressure of 43 mmHg assuming   an RA pressure of 10 mmHg.  LV diastolic dysfunction is present  Small right-sided pleural effusion    Conclusion:   Technically difficult study  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 Cannot rule out segmental dysfunction  Left atrial enlargement  Sclerotic trileaflet aortic valve. Mild AI  Thickened mitral valve leaflets with moderate MR  Mild TR  Small right-sided pleural effusion      JACQUELINE GOODEN   This document has been electronically signed. Jun 11 2019  8:16AM     < end of copied text >    < from: 12 Lead ECG (12.27.19 @ 16:04) >    Ventricular Rate 87 BPM    Atrial Rate 87 BPM    P-R Interval 166 ms    QRS Duration 98 ms    Q-T Interval 410 ms    QTC Calculation(Bezet) 493 ms    P Axis 45 degrees    R Axis -34 degrees    T Axis 2 degrees    Diagnosis Line Normal sinus rhythm  Left axis deviation  Minimal voltage criteria for LVH, may be normal variant    Confirmed by ANNY FAY ([id number]) on 12/28/2019 10:55:47 AM    < end of copied text >    < from: CT Angio Chest w/ IV Cont (12.28.19 @ 13:25) >    EXAM:  CT ANGIO CHEST (W)AW IC                            PROCEDURE DATE:  12/28/2019          INTERPRETATION:  CT ANGIO CHEST WITHOUT AND OR WITH IV CONTRAST    CLINICAL INFORMATION:  short of breath, atrial fibrillation    TECHNIQUE: Contrast enhanced CT pulmonary angiogram was performed. Multiplanar CT and HRCT images were reviewed. Maximum intensity projection (MIP) images are reconstructed as per CT angiography protocol. Images were acquired during the administration of 61 cc Omnipaque 350IV contrast. This study was performed using automatic exposure control (radiation dose reduction software) to obtain a diagnostic image quality scan with patient dose as low as reasonably achievable.    COMPARISON: same day CXR, CTA chest 6/8/2019    PULMONARY ARTERIES: No pulmonary embolism.    LUNGS, AIRWAYS: The central airways are patent. There is emphysema. Bibasilar mild fibrosis. No focal consolidation. Right midlung groundglass opacity.    PLEURA: No pleural abnormality.    VESSELS: Aortic atherosclerosis without aneurysm. No dissection.    HEART: Normal heart size. No pericardial effusion. Coronary artery calcifications are present.    MEDIASTINUM, ALEN, AXILLAE: No adenopathy.    UPPER ABDOMEN: Gallstones and renal stones. 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    BONES AND CHEST WALL: No acute bony abnormality.    IMPRESSION:     No pulmonary embolism.    Right midlung groundglass opacity. Findings may represent acute infection or inflammation versus progression of pulmonary fibrosis.    GRAZYNA GAGNON M.D., ATTENDING RADIOLOGIST  This document has been electronically signed. Dec 28 2019  2:02PM      < end of copied text >

## 2019-12-29 NOTE — PROGRESS NOTE ADULT - PROBLEM SELECTOR PLAN 1
[facility] rx regimen - BP control - valvular heart disease - hx of CVA    epistaxis - ENT eval noted - nasal spray -   Heme eval noted  Emphysema - CT chest reviewed - poss PNA - Procalcitonin Noted -   cont Zosyn - min of 5 days duration and cont NEBS tid for COPD Emphysema  monitor Hgb  monitor vs and HD and Sat  pt is on room air  assist with ADL  out of bed with assist  will follow  discussed with daughter

## 2019-12-29 NOTE — PROGRESS NOTE ADULT - SUBJECTIVE AND OBJECTIVE BOX
Date/Time Patient Seen:  		  Referring MD:   Data Reviewed	       Patient is a 84y old  Female who presents with a chief complaint of Epistaxis (29 Dec 2019 08:23)      Subjective/HPI     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  Osteoarthritis  Afib  PVD (peripheral vascular disease)  Neuropathy: (Right lower leg)  Gout  H/O cerebral aneurysm repair: brain clips  CVA (cerebral vascular accident): (&quot;Mini-stroke&quot;,1990&#x27;s)  Rheumatoid arthritis  Asthma  Hyperlipidemia  Hypertension  S/P cataract surgery: (Left eye)  Elective surgery: (&quot;Twisted bowel&quot;, 2014)  Elective surgery: (Exision of cyst on liver, 1985)  S/P ORIF (open reduction internal fixation) fracture: Left hip fx (2012) &amp; R hip fx (2013)  H/O cerebral aneurysm repair: Brain clips (1978(  Renal stone: Cysto stent placement 10/1/2014  PVD (peripheral vascular disease): s/p RLE bypass x 3, most recent 3/2012 Right external iliac to PT bypass w/ PTFE (2012)  S/P FRED (total abdominal hysterectomy): (1987, Hx of &quot;ovarian cancer?&quot;)        Medication list         MEDICATIONS  (STANDING):  albuterol/ipratropium for Nebulization 3 milliLiter(s) Nebulizer every 8 hours  aspirin enteric coated 81 milliGRAM(s) Oral daily  atorvastatin 40 milliGRAM(s) Oral at bedtime  BACItracin   Ointment 1 Application(s) Topical three times a day  diltiazem    milliGRAM(s) Oral daily  hydrALAZINE 50 milliGRAM(s) Oral every 6 hours  metoprolol succinate  milliGRAM(s) Oral daily  multivitamin 1 Tablet(s) Oral daily  pantoprazole    Tablet 40 milliGRAM(s) Oral before breakfast  piperacillin/tazobactam IVPB.. 3.375 Gram(s) IV Intermittent every 8 hours  senna 2 Tablet(s) Oral at bedtime  sodium chloride 0.65% Nasal 1 Spray(s) Both Nostrils four times a day  warfarin 5 milliGRAM(s) Oral once    MEDICATIONS  (PRN):  hydrALAZINE Injectable 10 milliGRAM(s) IV Push once PRN hypertension > 180 systolic  melatonin 5 milliGRAM(s) Oral at bedtime PRN Insomnia         Vitals log        ICU Vital Signs Last 24 Hrs  T(C): 36.9 (29 Dec 2019 04:23), Max: 37 (28 Dec 2019 08:53)  T(F): 98.4 (29 Dec 2019 04:23), Max: 98.6 (28 Dec 2019 08:53)  HR: 73 (29 Dec 2019 04:23) (69 - 100)  BP: 158/71 (29 Dec 2019 04:23) (134/72 - 175/98)  BP(mean): --  ABP: --  ABP(mean): --  RR: 16 (29 Dec 2019 04:23) (14 - 17)  SpO2: 97% (29 Dec 2019 04:23) (93% - 97%)           Input and Output:  I&O's Detail    28 Dec 2019 07:01  -  29 Dec 2019 07:00  --------------------------------------------------------  IN:    IV PiggyBack: 100 mL  Total IN: 100 mL    OUT:  Total OUT: 0 mL    Total NET: 100 mL          Lab Data                        7.9    8.32  )-----------( 178      ( 29 Dec 2019 07:03 )             25.5     12-29    146<H>  |  111<H>  |  41<H>  ----------------------------<  121<H>  3.9   |  26  |  1.20    Ca    8.8      29 Dec 2019 07:03  Phos  3.0     12-28  Mg     2.2     12-28    TPro  7.0  /  Alb  3.2<L>  /  TBili  1.1  /  DBili  x   /  AST  18  /  ALT  15  /  AlkPhos  69  12-29            Review of Systems	      Objective     Physical Examination    head at  heart s1s2  lung dec BS  abd soft  on room air  head nc      Pertinent Lab findings & Imaging      Fransico:  NO   Adequate UO     I&O's Detail    28 Dec 2019 07:01  -  29 Dec 2019 07:00  --------------------------------------------------------  IN:    IV PiggyBack: 100 mL  Total IN: 100 mL    OUT:  Total OUT: 0 mL    Total NET: 100 mL               Discussed with:     Cultures:	        Radiology

## 2019-12-29 NOTE — CONSULT NOTE ADULT - SUBJECTIVE AND OBJECTIVE BOX
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  Osteoarthritis  Afib  PVD (peripheral vascular disease)  Neuropathy  Gout  H/O cerebral aneurysm repair  CVA (cerebral vascular accident)  Rheumatoid arthritis  Asthma  Hyperlipidemia  Hypertension  S/P cataract surgery  Elective surgery  Elective surgery  S/P ORIF (open reduction internal fixation) fracture  H/O cerebral aneurysm repair  Renal stone  PVD (peripheral vascular disease)  S/P FRED (total abdominal hysterectomy)     HPI:  84/F w/ PMHx of afib (coumadin), CVA and hx of aneurysm repair, HTN, HLD, mitral valve prolapse, PVD s/p stents x 3 (RLE), rheumatoid arthritis, cataracts of the L eye BIBEMS to ED for evaluation of epistaxis. Patient noted to have start of nose bleed  evening when changing to get into bed, denies any nose-blowing or trauma, though she believes she may have sneezed at the onset. She noted "large drops" of blood at onset, at which time she applied an icepack to her nose, and tilted her head back, noted bleeding to slow, and went to bed. When she woke on the AM of  had blood all over his hands and pillow at which time EMS was activated. Upon waking on  she noted she felt weak and had to use the walls for support to ambulate. Denies any dizziness, SOB, chest pain, vision changes. Epistaxis noted to resolve without intervention in the ED. Denies any sick contacts or recent travel.     ED Vitals: T 97.5 HR 95 /78 RR 18 SpO2 97% on room air  Labs: WBC 13.15, anemic with HB 8.1, INR 1.79  CXR: Right perihilar airspace disease, atherosclerotic aorta. No effusion.  EKG: Pending at the time of admission  In the ED Patient received tylenol 650mg x 1 (27 Dec 2019 15:33)    gi consulted for n/v. chart reviewed- last seen by gi 2019 for melena and hematemesis and abd pain, sp egd- findings of candida esophagitis, gastritis and bile reflux; no stigmata of recent bleeding; given diflucan and trial of bentyl, recd op capsule (not done per op records) prior episodes of biliary sepsis, sp ercp. last colon  poor prep, findings if colon polyp, tortuous sigmoid, diverticulosis and hemorrhoids. pt seen and examined, fair hx, states came to hospital for epistaxis, now improved. c/o nausea and bilious vomiting over the past few days, started while inpt. last bm overnight. was eating ok pta but now w dec po. denies abd pain/hematemesis/d/c/melena.brbpr. scope as above. abd sx as per hpi. usually on asa and coumadin. per overnight rn had episode of vomiting overnight, light brownish in color but not overt coffee grounds?    recent vs/labs/imaging reviewed- avss  hgb 7.9 (below prior baseline but stable since admission)  bun downtrending   rosa  no abd imaging    No Known Allergies      albuterol/ipratropium for Nebulization 3 milliLiter(s) Nebulizer every 8 hours  aspirin enteric coated 81 milliGRAM(s) Oral daily  atorvastatin 40 milliGRAM(s) Oral at bedtime  BACItracin   Ointment 1 Application(s) Topical three times a day  diltiazem    milliGRAM(s) Oral daily  hydrALAZINE 50 milliGRAM(s) Oral every 6 hours  hydrALAZINE Injectable 10 milliGRAM(s) IV Push once PRN  melatonin 5 milliGRAM(s) Oral at bedtime PRN  metoprolol succinate  milliGRAM(s) Oral daily  multivitamin 1 Tablet(s) Oral daily  pantoprazole    Tablet 40 milliGRAM(s) Oral before breakfast  piperacillin/tazobactam IVPB.. 3.375 Gram(s) IV Intermittent every 8 hours  senna 2 Tablet(s) Oral at bedtime  sodium chloride 0.65% Nasal 1 Spray(s) Both Nostrils four times a day  warfarin 5 milliGRAM(s) Oral once        FAMILY HISTORY:  No pertinent family history in first degree relatives        Review of Systems:    General:  dec po  Eyes:  Good vision, no reported pain  ENT:  No sore throat, pain, runny nose, dysphagia  CV:  No pain, palpitations, no lightheadedness  Resp:  No dyspnea, cough, tachypnea, wheezing  GI: see above  :  No pain, bleeding, incontinence, nocturia  Muscle:  No pain, weakness  Neuro:  No weakness, tingling, memory problems  Psych:  No fatigue, insomnia, mood problems, depression  Endocrine:  No polyuria, polydypsia, cold/heat intolerance  Heme:  No petechiae, ecchymosis, easy bruisability  Skin:  No rash, tattoos, scars, edema    Relevant Family History:   n/c    Relevant Social History: n/c      Physical Exam:    Vital Signs:  Vital Signs Last 24 Hrs  T(C): 36.6 (29 Dec 2019 08:25), Max: 36.9 (28 Dec 2019 23:57)  T(F): 97.9 (29 Dec 2019 08:25), Max: 98.5 (28 Dec 2019 23:57)  HR: 70 (29 Dec 2019 08:44) (69 - 100)  BP: 182/84 (29 Dec 2019 08:25) (149/52 - 182/84)  BP(mean): --  RR: 16 (29 Dec 2019 08:25) (16 - 17)  SpO2: 93% (29 Dec 2019 08:44) (93% - 97%)  Daily     Daily Weight in k.1 (28 Dec 2019 11:29)    General:  lying in bed  HEENT:  NC/AT  Abdomen:  Soft, mild ttp epigastric region nd  Extremities:  no  edema  Neuro/Psych:  awake alert responds appropriately    Laboratory:                            7.9    8.32  )-----------( 178      ( 29 Dec 2019 07:03 )             25.5     12-    146<H>  |  111<H>  |  41<H>  ----------------------------<  121<H>  3.9   |  26  |  1.20    Ca    8.8      29 Dec 2019 07:03  Phos  3.0     -  Mg     2.2         TPro  7.0  /  Alb  3.2<L>  /  TBili  1.1  /  DBili  x   /  AST  18  /  ALT  15  /  AlkPhos  69      LIVER FUNCTIONS - ( 29 Dec 2019 07:03 )  Alb: 3.2 g/dL / Pro: 7.0 g/dL / ALK PHOS: 69 U/L / ALT: 15 U/L / AST: 18 U/L / GGT: x           PT/INR - ( 29 Dec 2019 07:03 )   PT: 12.9 sec;   INR: 1.13 ratio               Imaging:

## 2019-12-29 NOTE — PROGRESS NOTE ADULT - SUBJECTIVE AND OBJECTIVE BOX
Patient is a 84y old  Female who presents with a chief complaint of Epistaxis (29 Dec 2019 08:01)      INTERVAL HPI:  OVERNIGHT EVENTS:  T(F): 98.4 (12-29-19 @ 04:23), Max: 98.6 (12-28-19 @ 08:53)  HR: 73 (12-29-19 @ 04:23) (69 - 100)  BP: 158/71 (12-29-19 @ 04:23) (134/72 - 175/98)  RR: 16 (12-29-19 @ 04:23) (14 - 17)  SpO2: 97% (12-29-19 @ 04:23) (93% - 97%)  I&O's Summary    28 Dec 2019 07:01  -  29 Dec 2019 07:00  --------------------------------------------------------  IN: 100 mL / OUT: 0 mL / NET: 100 mL        REVIEW OF SYSTEMS:  CONSTITUTIONAL: No fever, weight loss, or fatigue  EYES: No eye pain, visual disturbances, or discharge  ENMT:  No difficulty hearing, tinnitus, vertigo; No sinus or throat pain  NECK: No pain or stiffness  BREASTS: No pain, masses, or nipple discharge  RESPIRATORY: No cough, wheezing, chills or hemoptysis; No shortness of breath  CARDIOVASCULAR: No chest pain, palpitations, dizziness, or leg swelling  GASTROINTESTINAL: No abdominal or epigastric pain. No nausea, vomiting, or hematemesis; No diarrhea or constipation. No melena or hematochezia.  GENITOURINARY: No dysuria, frequency, hematuria, or incontinence  NEUROLOGICAL: No headaches, memory loss, loss of strength, numbness, or tremors  SKIN: No itching, burning, rashes, or lesions   LYMPH NODES: No enlarged glands  ENDOCRINE: No heat or cold intolerance; No hair loss  MUSCULOSKELETAL: No joint pain or swelling; No muscle, back, or extremity pain  PSYCHIATRIC: No depression, anxiety, mood swings, or difficulty sleeping  HEME/LYMPH: No easy bruising, or bleeding gums  ALLERY AND IMMUNOLOGIC: No hives or eczema    PHYSICAL EXAM:  GENERAL: NAD, well-groomed, well-developed  HEAD:  Atraumatic, Normocephalic  EYES: EOMI, PERRLA, conjunctiva and sclera clear  ENMT: No tonsillar erythema, exudates, or enlargement; Moist mucous membranes, Good dentition, No lesions  NECK: Supple, No JVD, Normal thyroid  NERVOUS SYSTEM:  Alert & Oriented X3, Good concentration; Motor Strength 5/5 B/L upper and lower extremities; DTRs 2+ intact and symmetric  CHEST/LUNG: Clear to percussion bilaterally; No rales, rhonchi, wheezing, or rubs  HEART: Regular rate and rhythm; No murmurs, rubs, or gallops  ABDOMEN: Soft, Nontender, Nondistended; Bowel sounds present  EXTREMITIES:  2+ Peripheral Pulses, No clubbing, cyanosis, or edema  LYMPH: No lymphadenopathy noted  SKIN: No rashes or lesions    LABS:                        7.9    8.32  )-----------( 178      ( 29 Dec 2019 07:03 )             25.5     12-29    146<H>  |  111<H>  |  41<H>  ----------------------------<  121<H>  3.9   |  26  |  1.20    Ca    8.8      29 Dec 2019 07:03  Phos  3.0     12-28  Mg     2.2     12-28    TPro  7.0  /  Alb  3.2<L>  /  TBili  1.1  /  DBili  x   /  AST  18  /  ALT  15  /  AlkPhos  69  12-29    PT/INR - ( 29 Dec 2019 07:03 )   PT: 12.9 sec;   INR: 1.13 ratio             CAPILLARY BLOOD GLUCOSE                  MEDICATIONS  (STANDING):  albuterol/ipratropium for Nebulization 3 milliLiter(s) Nebulizer every 8 hours  aspirin enteric coated 81 milliGRAM(s) Oral daily  atorvastatin 40 milliGRAM(s) Oral at bedtime  BACItracin   Ointment 1 Application(s) Topical three times a day  diltiazem    milliGRAM(s) Oral daily  hydrALAZINE 50 milliGRAM(s) Oral three times a day  metoprolol succinate  milliGRAM(s) Oral daily  multivitamin 1 Tablet(s) Oral daily  pantoprazole    Tablet 40 milliGRAM(s) Oral before breakfast  piperacillin/tazobactam IVPB.. 3.375 Gram(s) IV Intermittent every 8 hours  senna 2 Tablet(s) Oral at bedtime  sodium chloride 0.65% Nasal 1 Spray(s) Both Nostrils four times a day  warfarin 5 milliGRAM(s) Oral once    MEDICATIONS  (PRN):  hydrALAZINE Injectable 10 milliGRAM(s) IV Push once PRN hypertension > 180 systolic  melatonin 5 milliGRAM(s) Oral at bedtime PRN Insomnia Patient is a 84y old  Female who presents with a chief complaint of Epistaxis (29 Dec 2019 08:01)      INTERVAL HPI: Pt seen and examined this AM and again in PM with daughter at bedside, Pt states she continues to feel weak and abd pain is subsiding but is intermittent.         OVERNIGHT EVENTS: none noted  T(F): 98.4 (12-29-19 @ 04:23), Max: 98.6 (12-28-19 @ 08:53)  HR: 73 (12-29-19 @ 04:23) (69 - 100)  BP: 158/71 (12-29-19 @ 04:23) (134/72 - 175/98)  RR: 16 (12-29-19 @ 04:23) (14 - 17)  SpO2: 97% (12-29-19 @ 04:23) (93% - 97%)  I&O's Summary    28 Dec 2019 07:01  -  29 Dec 2019 07:00  --------------------------------------------------------  IN: 100 mL / OUT: 0 mL / NET: 100 mL        REVIEW OF SYSTEMS:  CONSTITUTIONAL: No fever, weight loss, or fatigue  RESPIRATORY: No cough, wheezing, chills or hemoptysis; No shortness of breath  CARDIOVASCULAR: No chest pain, palpitations, dizziness, or leg swelling  GASTROINTESTINAL:  abd discomfort from prev vomiting, still some nausea  GENITOURINARY: No dysuria, frequency, hematuria, or incontinence  NEUROLOGICAL: No headaches, memory loss, loss of strength, numbness, or tremors  ENDOCRINE: No heat or cold intolerance; No hair loss  MUSCULOSKELETAL: No joint pain or swelling; No muscle, back, or extremity pain  PSYCHIATRIC: No depression, anxiety, mood swings, or difficulty sleeping  HEME/LYMPH: No easy bruising, or bleeding gums  ALLERY AND IMMUNOLOGIC: No hives or eczema    PHYSICAL EXAM:  GENERAL: mild distress, lethargic/fatigue,   NERVOUS SYSTEM:  Alert & Oriented X3, Good concentration; Motor Strength 3/5 B/L upper and lower extremities;  CHEST/LUNG: Clear to percussion bilaterally; No rales, rhonchi, wheezing, or rubs  HEART: Regular rate and rhythm; No murmurs, rubs, or gallops  ABDOMEN: Soft, Nontender, Nondistended; Bowel sounds present  EXTREMITIES:  2+ Peripheral Pulses, No clubbing, cyanosis, or edema  SKIN: No rashes or lesions    LABS:                        7.9    8.32  )-----------( 178      ( 29 Dec 2019 07:03 )             25.5     12-29    146<H>  |  111<H>  |  41<H>  ----------------------------<  121<H>  3.9   |  26  |  1.20    Ca    8.8      29 Dec 2019 07:03  Phos  3.0     12-28  Mg     2.2     12-28    TPro  7.0  /  Alb  3.2<L>  /  TBili  1.1  /  DBili  x   /  AST  18  /  ALT  15  /  AlkPhos  69  12-29    PT/INR - ( 29 Dec 2019 07:03 )   PT: 12.9 sec;   INR: 1.13 ratio             CAPILLARY BLOOD GLUCOSE                  MEDICATIONS  (STANDING):  albuterol/ipratropium for Nebulization 3 milliLiter(s) Nebulizer every 8 hours  aspirin enteric coated 81 milliGRAM(s) Oral daily  atorvastatin 40 milliGRAM(s) Oral at bedtime  BACItracin   Ointment 1 Application(s) Topical three times a day  diltiazem    milliGRAM(s) Oral daily  hydrALAZINE 50 milliGRAM(s) Oral three times a day  metoprolol succinate  milliGRAM(s) Oral daily  multivitamin 1 Tablet(s) Oral daily  pantoprazole    Tablet 40 milliGRAM(s) Oral before breakfast  piperacillin/tazobactam IVPB.. 3.375 Gram(s) IV Intermittent every 8 hours  senna 2 Tablet(s) Oral at bedtime  sodium chloride 0.65% Nasal 1 Spray(s) Both Nostrils four times a day  warfarin 5 milliGRAM(s) Oral once    MEDICATIONS  (PRN):  hydrALAZINE Injectable 10 milliGRAM(s) IV Push once PRN hypertension > 180 systolic  melatonin 5 milliGRAM(s) Oral at bedtime PRN Insomnia

## 2019-12-29 NOTE — PROGRESS NOTE ADULT - SUBJECTIVE AND OBJECTIVE BOX
Interval History:  still weak but better than yesterday  no bleeding and has been resumed on coumadin    Chart reviewed and events noted;   Overnight events:    MEDICATIONS  (STANDING):  albuterol/ipratropium for Nebulization 3 milliLiter(s) Nebulizer every 8 hours  aspirin enteric coated 81 milliGRAM(s) Oral daily  atorvastatin 40 milliGRAM(s) Oral at bedtime  BACItracin   Ointment 1 Application(s) Topical three times a day  diltiazem    milliGRAM(s) Oral daily  hydrALAZINE 50 milliGRAM(s) Oral every 6 hours  metoprolol succinate  milliGRAM(s) Oral daily  multivitamin 1 Tablet(s) Oral daily  pantoprazole    Tablet 40 milliGRAM(s) Oral before breakfast  piperacillin/tazobactam IVPB.. 3.375 Gram(s) IV Intermittent every 8 hours  senna 2 Tablet(s) Oral at bedtime  sodium chloride 0.65% Nasal 1 Spray(s) Both Nostrils four times a day  warfarin 5 milliGRAM(s) Oral once    MEDICATIONS  (PRN):  hydrALAZINE Injectable 10 milliGRAM(s) IV Push once PRN hypertension > 180 systolic  melatonin 5 milliGRAM(s) Oral at bedtime PRN Insomnia      Vital Signs Last 24 Hrs  T(C): 36.6 (29 Dec 2019 08:25), Max: 36.9 (28 Dec 2019 23:57)  T(F): 97.9 (29 Dec 2019 08:25), Max: 98.5 (28 Dec 2019 23:57)  HR: 70 (29 Dec 2019 08:44) (69 - 100)  BP: 182/84 (29 Dec 2019 08:25) (149/52 - 182/84)  BP(mean): --  RR: 16 (29 Dec 2019 08:25) (16 - 17)  SpO2: 93% (29 Dec 2019 08:44) (93% - 97%)    PHYSICAL EXAM  General: adult in NAD  HEENT: clear oropharynx, anicteric sclera, pink conjunctivae  Neck: supple  CV: normal S1S2 with no murmur rubs or gallops  Lungs: clear to auscultation, no wheezes, no rhales  Abdomen: soft non-tender non-distended, no hepato/splenomegaly  Ext: no clubbing cyanosis or edema  Skin: no rashes and no petichiae  Neuro: alert and oriented X3 no focal deficits      LABS:  CBC Full  -  ( 29 Dec 2019 07:03 )  WBC Count : 8.32 K/uL  RBC Count : 2.89 M/uL  Hemoglobin : 7.9 g/dL  Hematocrit : 25.5 %  Platelet Count - Automated : 178 K/uL  Mean Cell Volume : 88.2 fl  Mean Cell Hemoglobin : 27.3 pg  Mean Cell Hemoglobin Concentration : 31.0 gm/dL  Auto Neutrophil # : 6.96 K/uL  Auto Lymphocyte # : 0.70 K/uL  Auto Monocyte # : 0.63 K/uL  Auto Eosinophil # : 0.00 K/uL  Auto Basophil # : 0.00 K/uL  Auto Neutrophil % : 83.6 %  Auto Lymphocyte % : 8.4 %  Auto Monocyte % : 7.6 %  Auto Eosinophil % : 0.0 %  Auto Basophil % : 0.0 %    12-29    146<H>  |  111<H>  |  41<H>  ----------------------------<  121<H>  3.9   |  26  |  1.20    Ca    8.8      29 Dec 2019 07:03  Phos  3.0     12-28  Mg     2.2     12-28    TPro  7.0  /  Alb  3.2<L>  /  TBili  1.1  /  DBili  x   /  AST  18  /  ALT  15  /  AlkPhos  69  12-29    PT/INR - ( 29 Dec 2019 07:03 )   PT: 12.9 sec;   INR: 1.13 ratio             fe studies  Ferritin, Serum: 106 ng/mL (12-28 @ 15:51)  Iron - Total Binding Capacity.: 243 ug/dL (12-28 @ 15:51)      WBC trend  8.32 K/uL (12-29-19 @ 07:03)  7.84 K/uL (12-28-19 @ 07:13)  13.15 K/uL (12-26-19 @ 18:39)      Hgb trend  7.9 g/dL (12-29-19 @ 07:03)  8.0 g/dL (12-28-19 @ 07:13)  8.1 g/dL (12-26-19 @ 18:39)      plt trend  178 K/uL (12-29-19 @ 07:03)  140 K/uL (12-28-19 @ 07:13)  126 K/uL (12-26-19 @ 18:39)        RADIOLOGY & ADDITIONAL STUDIES:

## 2019-12-29 NOTE — CONSULT NOTE ADULT - ASSESSMENT
n/v/epigastric pain  anemia, multifactorial  epistaxis, resolving    check axr  check lipase  increase ppi to bid  start carafate bid, maalox prn  start tigan prn for n/v given qtc; dw pharmacy   diet as tolerated, downgrade diet if symptoms persist  no s/s active gib; monitor cbc, transfuse prn  recent egd showed candida esophagitis, gastritis and bile reflux  monitor exam; further recs pending clinical course

## 2019-12-30 LAB
ALBUMIN SERPL ELPH-MCNC: 3.3 G/DL — SIGNIFICANT CHANGE UP (ref 3.3–5)
ALP SERPL-CCNC: 63 U/L — SIGNIFICANT CHANGE UP (ref 40–120)
ALT FLD-CCNC: 15 U/L — SIGNIFICANT CHANGE UP (ref 12–78)
ANION GAP SERPL CALC-SCNC: 7 MMOL/L — SIGNIFICANT CHANGE UP (ref 5–17)
AST SERPL-CCNC: 16 U/L — SIGNIFICANT CHANGE UP (ref 15–37)
BASOPHILS # BLD AUTO: 0.02 K/UL — SIGNIFICANT CHANGE UP (ref 0–0.2)
BASOPHILS NFR BLD AUTO: 0.2 % — SIGNIFICANT CHANGE UP (ref 0–2)
BILIRUB SERPL-MCNC: 1.7 MG/DL — HIGH (ref 0.2–1.2)
BUN SERPL-MCNC: 35 MG/DL — HIGH (ref 7–23)
CALCIUM SERPL-MCNC: 8.7 MG/DL — SIGNIFICANT CHANGE UP (ref 8.5–10.1)
CHLORIDE SERPL-SCNC: 111 MMOL/L — HIGH (ref 96–108)
CO2 SERPL-SCNC: 25 MMOL/L — SIGNIFICANT CHANGE UP (ref 22–31)
CREAT SERPL-MCNC: 1.2 MG/DL — SIGNIFICANT CHANGE UP (ref 0.5–1.3)
EOSINOPHIL # BLD AUTO: 0.01 K/UL — SIGNIFICANT CHANGE UP (ref 0–0.5)
EOSINOPHIL NFR BLD AUTO: 0.1 % — SIGNIFICANT CHANGE UP (ref 0–6)
GLUCOSE SERPL-MCNC: 107 MG/DL — HIGH (ref 70–99)
HCT VFR BLD CALC: 33.3 % — LOW (ref 34.5–45)
HGB BLD-MCNC: 10.5 G/DL — LOW (ref 11.5–15.5)
IMM GRANULOCYTES NFR BLD AUTO: 0.3 % — SIGNIFICANT CHANGE UP (ref 0–1.5)
INR BLD: 1.39 RATIO — HIGH (ref 0.88–1.16)
LYMPHOCYTES # BLD AUTO: 0.82 K/UL — LOW (ref 1–3.3)
LYMPHOCYTES # BLD AUTO: 9.1 % — LOW (ref 13–44)
MCHC RBC-ENTMCNC: 26.9 PG — LOW (ref 27–34)
MCHC RBC-ENTMCNC: 31.5 GM/DL — LOW (ref 32–36)
MCV RBC AUTO: 85.4 FL — SIGNIFICANT CHANGE UP (ref 80–100)
MONOCYTES # BLD AUTO: 0.58 K/UL — SIGNIFICANT CHANGE UP (ref 0–0.9)
MONOCYTES NFR BLD AUTO: 6.4 % — SIGNIFICANT CHANGE UP (ref 2–14)
NEUTROPHILS # BLD AUTO: 7.54 K/UL — HIGH (ref 1.8–7.4)
NEUTROPHILS NFR BLD AUTO: 83.9 % — HIGH (ref 43–77)
NRBC # BLD: 0 /100 WBCS — SIGNIFICANT CHANGE UP (ref 0–0)
PLATELET # BLD AUTO: 209 K/UL — SIGNIFICANT CHANGE UP (ref 150–400)
POTASSIUM SERPL-MCNC: 3.5 MMOL/L — SIGNIFICANT CHANGE UP (ref 3.5–5.3)
POTASSIUM SERPL-SCNC: 3.5 MMOL/L — SIGNIFICANT CHANGE UP (ref 3.5–5.3)
PROCALCITONIN SERPL-MCNC: 1.64 NG/ML — HIGH (ref 0–0.04)
PROT SERPL-MCNC: 7.1 G/DL — SIGNIFICANT CHANGE UP (ref 6–8.3)
PROTHROM AB SERPL-ACNC: 15.9 SEC — HIGH (ref 10–12.9)
RBC # BLD: 3.9 M/UL — SIGNIFICANT CHANGE UP (ref 3.8–5.2)
RBC # FLD: 17.1 % — HIGH (ref 10.3–14.5)
SODIUM SERPL-SCNC: 143 MMOL/L — SIGNIFICANT CHANGE UP (ref 135–145)
WBC # BLD: 9 K/UL — SIGNIFICANT CHANGE UP (ref 3.8–10.5)
WBC # FLD AUTO: 9 K/UL — SIGNIFICANT CHANGE UP (ref 3.8–10.5)

## 2019-12-30 PROCEDURE — 99232 SBSQ HOSP IP/OBS MODERATE 35: CPT

## 2019-12-30 PROCEDURE — 99233 SBSQ HOSP IP/OBS HIGH 50: CPT | Mod: GC

## 2019-12-30 PROCEDURE — 76706 US ABDL AORTA SCREEN AAA: CPT | Mod: 26

## 2019-12-30 PROCEDURE — 74177 CT ABD & PELVIS W/CONTRAST: CPT | Mod: 26

## 2019-12-30 RX ORDER — WARFARIN SODIUM 2.5 MG/1
5 TABLET ORAL ONCE
Refills: 0 | Status: DISCONTINUED | OUTPATIENT
Start: 2019-12-30 | End: 2019-12-30

## 2019-12-30 RX ORDER — WARFARIN SODIUM 2.5 MG/1
4 TABLET ORAL ONCE
Refills: 0 | Status: COMPLETED | OUTPATIENT
Start: 2019-12-30 | End: 2019-12-30

## 2019-12-30 RX ORDER — ACETAMINOPHEN 500 MG
650 TABLET ORAL ONCE
Refills: 0 | Status: COMPLETED | OUTPATIENT
Start: 2019-12-30 | End: 2019-12-30

## 2019-12-30 RX ORDER — OXYCODONE AND ACETAMINOPHEN 5; 325 MG/1; MG/1
1 TABLET ORAL ONCE
Refills: 0 | Status: DISCONTINUED | OUTPATIENT
Start: 2019-12-30 | End: 2019-12-30

## 2019-12-30 RX ORDER — HYDRALAZINE HCL 50 MG
100 TABLET ORAL EVERY 8 HOURS
Refills: 0 | Status: DISCONTINUED | OUTPATIENT
Start: 2019-12-30 | End: 2020-01-04

## 2019-12-30 RX ORDER — DILTIAZEM HCL 120 MG
240 CAPSULE, EXT RELEASE 24 HR ORAL DAILY
Refills: 0 | Status: DISCONTINUED | OUTPATIENT
Start: 2019-12-30 | End: 2020-01-04

## 2019-12-30 RX ADMIN — Medication 1 SPRAY(S): at 18:10

## 2019-12-30 RX ADMIN — PIPERACILLIN AND TAZOBACTAM 25 GRAM(S): 4; .5 INJECTION, POWDER, LYOPHILIZED, FOR SOLUTION INTRAVENOUS at 09:20

## 2019-12-30 RX ADMIN — OXYCODONE AND ACETAMINOPHEN 1 TABLET(S): 5; 325 TABLET ORAL at 10:17

## 2019-12-30 RX ADMIN — Medication 3 MILLILITER(S): at 07:30

## 2019-12-30 RX ADMIN — LIDOCAINE 1 PATCH: 4 CREAM TOPICAL at 08:06

## 2019-12-30 RX ADMIN — Medication 1 SPRAY(S): at 23:04

## 2019-12-30 RX ADMIN — Medication 3 MILLILITER(S): at 19:34

## 2019-12-30 RX ADMIN — Medication 150 MILLIGRAM(S): at 05:35

## 2019-12-30 RX ADMIN — Medication 120 MILLIGRAM(S): at 05:35

## 2019-12-30 RX ADMIN — Medication 3 MILLILITER(S): at 16:39

## 2019-12-30 RX ADMIN — OXYCODONE AND ACETAMINOPHEN 1 TABLET(S): 5; 325 TABLET ORAL at 09:17

## 2019-12-30 RX ADMIN — Medication 1 APPLICATION(S): at 23:03

## 2019-12-30 RX ADMIN — PIPERACILLIN AND TAZOBACTAM 25 GRAM(S): 4; .5 INJECTION, POWDER, LYOPHILIZED, FOR SOLUTION INTRAVENOUS at 18:10

## 2019-12-30 RX ADMIN — Medication 1 TABLET(S): at 14:48

## 2019-12-30 RX ADMIN — Medication 1 GRAM(S): at 18:10

## 2019-12-30 RX ADMIN — PANTOPRAZOLE SODIUM 40 MILLIGRAM(S): 20 TABLET, DELAYED RELEASE ORAL at 18:10

## 2019-12-30 RX ADMIN — Medication 81 MILLIGRAM(S): at 14:48

## 2019-12-30 RX ADMIN — WARFARIN SODIUM 4 MILLIGRAM(S): 2.5 TABLET ORAL at 23:02

## 2019-12-30 RX ADMIN — PIPERACILLIN AND TAZOBACTAM 25 GRAM(S): 4; .5 INJECTION, POWDER, LYOPHILIZED, FOR SOLUTION INTRAVENOUS at 23:01

## 2019-12-30 RX ADMIN — PANTOPRAZOLE SODIUM 40 MILLIGRAM(S): 20 TABLET, DELAYED RELEASE ORAL at 05:35

## 2019-12-30 RX ADMIN — Medication 650 MILLIGRAM(S): at 00:24

## 2019-12-30 RX ADMIN — Medication 1 APPLICATION(S): at 14:49

## 2019-12-30 RX ADMIN — Medication 50 MILLIGRAM(S): at 05:35

## 2019-12-30 RX ADMIN — Medication 5 MILLIGRAM(S): at 23:02

## 2019-12-30 RX ADMIN — Medication 1 APPLICATION(S): at 05:34

## 2019-12-30 RX ADMIN — Medication 650 MILLIGRAM(S): at 01:24

## 2019-12-30 RX ADMIN — LIDOCAINE 1 PATCH: 4 CREAM TOPICAL at 11:41

## 2019-12-30 RX ADMIN — ATORVASTATIN CALCIUM 40 MILLIGRAM(S): 80 TABLET, FILM COATED ORAL at 23:03

## 2019-12-30 RX ADMIN — Medication 100 MILLIGRAM(S): at 14:49

## 2019-12-30 RX ADMIN — Medication 1 GRAM(S): at 05:35

## 2019-12-30 RX ADMIN — SENNA PLUS 2 TABLET(S): 8.6 TABLET ORAL at 23:03

## 2019-12-30 RX ADMIN — Medication 1 SPRAY(S): at 14:48

## 2019-12-30 RX ADMIN — Medication 1 SPRAY(S): at 05:34

## 2019-12-30 RX ADMIN — Medication 100 MILLIGRAM(S): at 23:02

## 2019-12-30 NOTE — PROGRESS NOTE ADULT - PROBLEM SELECTOR PLAN 7
Chronic, stable  -hx of CVA in 1990s and cerebral aneursym s/p clipping  -Continue ASA81, statin Chronic, stable  -hx of CVA in 1990s and cerebral aneursym s/p clipping  -Continue ASA81, statin  -Vascular consult, ultrasound stat due to abdominal pain and R/O dissecting aneurysm Chronic, stable  -hx of CVA in 1990s and cerebral aneurysm s/p clipping  -Continue ASA81, statin

## 2019-12-30 NOTE — PROGRESS NOTE ADULT - SUBJECTIVE AND OBJECTIVE BOX
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  Osteoarthritis  Afib  PVD (peripheral vascular disease)  Neuropathy: (Right lower leg)  Gout  H/O cerebral aneurysm repair: brain clips  CVA (cerebral vascular accident): (&quot;Mini-stroke&quot;,1990&#x27;s)  Rheumatoid arthritis  Asthma  Hyperlipidemia  Hypertension  S/P cataract surgery: (Left eye)  Elective surgery: (&quot;Twisted bowel&quot;, 2014)  Elective surgery: (Exision of cyst on liver, 1985)  S/P ORIF (open reduction internal fixation) fracture: Left hip fx (2012) &amp; R hip fx (2013)  H/O cerebral aneurysm repair: Brain clips (1978(  Renal stone: Cysto stent placement 10/1/2014  PVD (peripheral vascular disease): s/p RLE bypass x 3, most recent 3/2012 Right external iliac to PT bypass w/ PTFE (2012)  S/P FRED (total abdominal hysterectomy): (1987, Hx of &quot;ovarian cancer?&quot;)        Medication list         MEDICATIONS  (STANDING):  albuterol/ipratropium for Nebulization 3 milliLiter(s) Nebulizer every 8 hours  aspirin enteric coated 81 milliGRAM(s) Oral daily  atorvastatin 40 milliGRAM(s) Oral at bedtime  BACItracin   Ointment 1 Application(s) Topical three times a day  diltiazem    milliGRAM(s) Oral daily  hydrALAZINE 100 milliGRAM(s) Oral every 8 hours  lactated ringers. 1000 milliLiter(s) (50 mL/Hr) IV Continuous <Continuous>  metoprolol succinate  milliGRAM(s) Oral daily  multivitamin 1 Tablet(s) Oral daily  pantoprazole    Tablet 40 milliGRAM(s) Oral two times a day  piperacillin/tazobactam IVPB.. 3.375 Gram(s) IV Intermittent every 8 hours  senna 2 Tablet(s) Oral at bedtime  sodium chloride 0.65% Nasal 1 Spray(s) Both Nostrils four times a day  sucralfate 1 Gram(s) Oral two times a day    MEDICATIONS  (PRN):  aluminum hydroxide/magnesium hydroxide/simethicone Suspension 30 milliLiter(s) Oral every 6 hours PRN Dyspepsia  lidocaine   Patch 1 Patch Transdermal daily PRN back pain  melatonin 5 milliGRAM(s) Oral at bedtime PRN Insomnia  trimethobenzamide 300 milliGRAM(s) Oral three times a day PRN Nausea and/or Vomiting         Vitals log        ICU Vital Signs Last 24 Hrs  T(C): 36.7 (30 Dec 2019 07:19), Max: 36.8 (29 Dec 2019 13:00)  T(F): 98 (30 Dec 2019 07:19), Max: 98.2 (29 Dec 2019 13:00)  HR: 74 (30 Dec 2019 07:49) (64 - 87)  BP: 190/90 (30 Dec 2019 08:04) (171/76 - 192/91)  BP(mean): --  ABP: --  ABP(mean): --  RR: 18 (30 Dec 2019 07:19) (18 - 19)  SpO2: 96% (30 Dec 2019 07:49) (93% - 98%)           Input and Output:  I&O's Detail    29 Dec 2019 07:01  -  30 Dec 2019 07:00  --------------------------------------------------------  IN:    Oral Fluid: 300 mL  Total IN: 300 mL    OUT:  Total OUT: 0 mL    Total NET: 300 mL          Lab Data                        10.5   9.00  )-----------( 209      ( 30 Dec 2019 06:38 )             33.3     12-30    143  |  111<H>  |  35<H>  ----------------------------<  107<H>  3.5   |  25  |  1.20    Ca    8.7      30 Dec 2019 06:38    TPro  7.1  /  Alb  3.3  /  TBili  1.7<H>  /  DBili  x   /  AST  16  /  ALT  15  /  AlkPhos  63  12-30            Review of Systems	      Objective     Physical Examination    head at  heart s1s2  lung dec BS  abd soft  on room air  weak  frail  head nc      Pertinent Lab findings & Imaging      Fransico:  NO   Adequate UO     I&O's Detail    29 Dec 2019 07:01  -  30 Dec 2019 07:00  --------------------------------------------------------  IN:    Oral Fluid: 300 mL  Total IN: 300 mL    OUT:  Total OUT: 0 mL    Total NET: 300 mL               Discussed with:     Cultures:	        Radiology

## 2019-12-30 NOTE — PROGRESS NOTE ADULT - SUBJECTIVE AND OBJECTIVE BOX
[facility] -- Dahiana Bañuelos, Toney Horner, Truman Fay Savella, [place]  Office # [phone]    Follow Up:  Uncontrolled HTN    Subjective/Observations: Sitting on the chair, on RA.  Denies any form of discomfort at this time.      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  Osteoarthritis  Afib  PVD (peripheral vascular disease)  Neuropathy: (Right lower leg)  H/O cerebral aneurysm repair: brain clips  CVA (cerebral vascular accident): (&quot;Mini-stroke&quot;,1990&#x27;s)  Rheumatoid arthritis  Hyperlipidemia  Hypertension  S/P cataract surgery: (Left eye)  Elective surgery: (&quot;Twisted bowel&quot;, 2014)  Elective surgery: (Exision of cyst on liver, 1985)  S/P ORIF (open reduction internal fixation) fracture: Left hip fx (2012) &amp; R hip fx (2013)  H/O cerebral aneurysm repair: Brain clips (1978(  Renal stone: Cysto stent placement 10/1/2014  PVD (peripheral vascular disease): s/p RLE bypass x 3, most recent 3/2012 Right external iliac to PT bypass w/ PTFE (2012)  S/P FRED (total abdominal hysterectomy): (1987, Hx of &quot;ovarian cancer?&quot;)    MEDICATIONS  (STANDING):  albuterol/ipratropium for Nebulization 3 milliLiter(s) Nebulizer every 8 hours  aspirin enteric coated 81 milliGRAM(s) Oral daily  atorvastatin 40 milliGRAM(s) Oral at bedtime  BACItracin   Ointment 1 Application(s) Topical three times a day  diltiazem    milliGRAM(s) Oral daily  hydrALAZINE 100 milliGRAM(s) Oral every 8 hours  lactated ringers. 1000 milliLiter(s) (50 mL/Hr) IV Continuous <Continuous>  metoprolol succinate  milliGRAM(s) Oral daily  multivitamin 1 Tablet(s) Oral daily  pantoprazole    Tablet 40 milliGRAM(s) Oral two times a day  piperacillin/tazobactam IVPB.. 3.375 Gram(s) IV Intermittent every 8 hours  senna 2 Tablet(s) Oral at bedtime  sodium chloride 0.65% Nasal 1 Spray(s) Both Nostrils four times a day  sucralfate 1 Gram(s) Oral two times a day    MEDICATIONS  (PRN):  aluminum hydroxide/magnesium hydroxide/simethicone Suspension 30 milliLiter(s) Oral every 6 hours PRN Dyspepsia  lidocaine   Patch 1 Patch Transdermal daily PRN back pain  melatonin 5 milliGRAM(s) Oral at bedtime PRN Insomnia  trimethobenzamide 300 milliGRAM(s) Oral three times a day PRN Nausea and/or Vomiting    Allergies    No Known Allergies    Intolerances    Vital Signs Last 24 Hrs  T(C): 36.7 (30 Dec 2019 07:19), Max: 36.8 (29 Dec 2019 13:00)  T(F): 98 (30 Dec 2019 07:19), Max: 98.2 (29 Dec 2019 13:00)  HR: 73 (30 Dec 2019 10:48) (64 - 87)  BP: 182/85 (30 Dec 2019 10:48) (171/76 - 192/91)  BP(mean): --  RR: 18 (30 Dec 2019 07:19) (18 - 19)  SpO2: 96% (30 Dec 2019 07:49) (94% - 98%)  I&O's Summary    29 Dec 2019 07:01  -  30 Dec 2019 07:00  --------------------------------------------------------  IN: 300 mL / OUT: 0 mL / NET: 300 mL      PHYSICAL EXAM:  TELE: NSR  Constitutional: NAD, awake and alert, cachectic  HEENT: Moist Mucous Membranes, Anicteric  Pulmonary: Non-labored, breath sounds are diminished bilaterally, No wheezing, rales or rhonchi  Cardiovascular: Regular, S1 and S2, + murmurs.  No rubs, gallops or clicks  Gastrointestinal: Bowel Sounds present, soft, nontender.   Lymph: No peripheral edema. No lymphadenopathy.  Skin: No visible rashes or ulcers.  Psych:  Mood & affect appropriate  LABS: All Labs Reviewed:                        10.5   9.00  )-----------( 209      ( 30 Dec 2019 06:38 )             33.3                         10.0   10.27 )-----------( 189      ( 29 Dec 2019 17:08 )             30.8                         7.9    8.32  )-----------( 178      ( 29 Dec 2019 07:03 )             25.5     30 Dec 2019 06:38    143    |  111    |  35     ----------------------------<  107    3.5     |  25     |  1.20   29 Dec 2019 07:03    146    |  111    |  41     ----------------------------<  121    3.9     |  26     |  1.20   28 Dec 2019 07:13    144    |  112    |  42     ----------------------------<  91     3.4     |  25     |  1.10     Ca    8.7        30 Dec 2019 06:38  Ca    8.8        29 Dec 2019 07:03  Ca    8.5        28 Dec 2019 07:13  Phos  3.0       28 Dec 2019 07:13  Mg     2.2       28 Dec 2019 07:13    TPro  7.1    /  Alb  3.3    /  TBili  1.7    /  DBili  .30    /  AST  16     /  ALT  15     /  AlkPhos  63     30 Dec 2019 06:38  TPro  7.0    /  Alb  3.2    /  TBili  1.1    /  DBili  x      /  AST  18     /  ALT  15     /  AlkPhos  69     29 Dec 2019 07:03  TPro  6.9    /  Alb  3.1    /  TBili  1.0    /  DBili  x      /  AST  16     /  ALT  15     /  AlkPhos  61     28 Dec 2019 07:13    PT/INR - ( 30 Dec 2019 06:38 )   PT: 15.9 sec;   INR: 1.39 ratio      < from: TTE Echo Doppler w/o Cont (06.10.19 @ 14:06) >     EXAM:  ECHO TTE WO CON COMP W DOPPLR      PROCEDURE DATE:  06/10/2019      INTERPRETATION:  INDICATION: NSTEMI    Blood Pressure 157/82    Height 154.9 cm     Weight 52.3 kg       BSA   1.49 sq m    Dimensions:    LA 3.6       Normal Values: 2.0 - 4.0 cm    Ao 2.9        Normal Values: 2.0 - 3.8 cm  SEPTUM 1.0       Normal Values: 0.6 - 1.2 cm  PWT 0.9       Normal Values: 0.6 - 1.1 cm  LVIDd 4.2         Normal Values: 3.0 - 5.6 cm  LVIDs 3.4         Normal Values: 1.8 - 4.0 cm    OBSERVATIONS:  Technically difficult study  Mitral Valve: Thickened leaflets, moderate MR.  Aortic Valve/Aorta: Sclerotic trileaflet aortic valve. Mild AI  Tricuspid Valve: normal with mild TR.  Pulmonic Valve: Mild PI  Left Atrium: Enlarged  Right Atrium:normal  Left Ventricle: Endocardium not well-visualized, estimated LVEF of   45-50%. Cannot rule out segmental dysfunction  Right Ventricle: Not well-visualized  Pericardium/Pleura: normal, no significant pericardial effusion.  Pulmonary/RV Pressure:estimated PA systolic pressure of 43 mmHg assuming   an RA pressure of 10 mmHg.  LV diastolic dysfunction is present  Small right-sided pleural effusion    Conclusion:   Technically difficult study  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 Cannot rule out segmental dysfunction  Left atrial enlargement  Sclerotic trileaflet aortic valve. Mild AI  Thickened mitral valve leaflets with moderate MR  Mild TR  Small right-sided pleural effusion      JACQUELINE GOODEN   This document has been electronically signed. Jun 11 2019  8:16AM     < end of copied text >    < from: 12 Lead ECG (12.27.19 @ 16:04) >    Ventricular Rate 87 BPM    Atrial Rate 87 BPM    P-R Interval 166 ms    QRS Duration 98 ms    Q-T Interval 410 ms    QTC Calculation(Bezet) 493 ms    P Axis 45 degrees    R Axis -34 degrees    T Axis 2 degrees    Diagnosis Line Normal sinus rhythm  Left axis deviation  Minimal voltage criteria for LVH, may be normal variant    Confirmed by ANNY FAY ([id number]) on 12/28/2019 10:55:47 AM    < end of copied text >    < from: CT Angio Chest w/ IV Cont (12.28.19 @ 13:25) >    EXAM:  CT ANGIO CHEST (W)AW IC                          PROCEDURE DATE:  12/28/2019      INTERPRETATION:  CT ANGIO CHEST WITHOUT AND OR WITH IV CONTRAST    CLINICAL INFORMATION:  short of breath, atrial fibrillation    TECHNIQUE: Contrast enhanced CT pulmonary angiogram was performed. Multiplanar CT and HRCT images were reviewed. Maximum intensity projection (MIP) images are reconstructed as per CT angiography protocol. Images were acquired during the administration of 61 cc Omnipaque 350IV contrast. This study was performed using automatic exposure control (radiation dose reduction software) to obtain a diagnostic image quality scan with patient dose as low as reasonably achievable.    COMPARISON: same day CXR, CTA chest 6/8/2019    PULMONARY ARTERIES: No pulmonary embolism.    LUNGS, AIRWAYS: The central airways are patent. There is emphysema. Bibasilar mild fibrosis. No focal consolidation. Right midlung groundglass opacity.    PLEURA: No pleural abnormality.    VESSELS: Aortic atherosclerosis without aneurysm. No dissection.    HEART: Normal heart size. No pericardial effusion. Coronary artery calcifications are present.    MEDIASTINUM, ALEN, AXILLAE: No adenopathy.    UPPER ABDOMEN: Gallstones and renal stones. 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    BONES AND CHEST WALL: No acute bony abnormality.    IMPRESSION:     No pulmonary embolism.    Right midlung groundglass opacity. Findings may represent acute infection or inflammation versus progression of pulmonary fibrosis.    GRAZYNA GAGNON M.D., ATTENDING RADIOLOGIST  This document has been electronically signed. Dec 28 2019  2:02PM      < end of copied text >

## 2019-12-30 NOTE — PROGRESS NOTE ADULT - SUBJECTIVE AND OBJECTIVE BOX
[INTERVAL HX: ]  Patient seen and examined;  Chart reviewed and events noted;   no CP, no SOB.  ambulated only to door.   Feel weak. 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) [Active]  Osteoarthritis (715.90) [Active]  Afib (427.31) [Active]  PVD (peripheral vascular disease) (443.9) [Active]  Neuropathy (355.9) [Active]  Gout (274.9) [Inactive]  H/O cerebral aneurysm repair (V45.89) [Active]  CVA (cerebral vascular accident) (434.91) [Active]  Rheumatoid arthritis (714.0) [Active]  Asthma (493.90) [Inactive]  Hyperlipidemia (272.4) [Active]  Hypertension (401.9) [Active]  S/P cataract surgery (Z98.49) [Active]  Elective surgery (Z41.9) [Active]  Elective surgery (Z41.9) [Active]  S/P ORIF (open reduction internal fixation) fracture (V45.89) [Active]  H/O cerebral aneurysm repair (V45.89) [Active]  Renal stone (592.0) [Active]  PVD (peripheral vascular disease) (443.9) [Active]  S/P FRED (total abdominal hysterectomy) (V88.01) [Active]    HPI:  84/F w/ PMHx of afib (coumadin), CVA and hx of aneurysm repair, HTN, HLD, mitral valve prolapse, PVD s/p stents x 3 (RLE), rheumatoid arthritis, cataracts of the L eye BIBEMS to ED for evaluation of epistaxis. Patient noted to have start of nose bleed 12/25 evening when changing to get into bed, denies any nose-blowing or trauma, though she believes she may have sneezed at the onset. She noted "large drops" of blood at onset, at which time she applied an icepack to her nose, and tilted her head back, noted bleeding to slow, and went to bed. When she woke on the AM of 12/26 had blood all over his hands and pillow at which time EMS was activated. Upon waking on 12/26 she noted she felt weak and had to use the walls for support to ambulate. Denies any dizziness, SOB, chest pain, vision changes. Epistaxis noted to resolve without intervention in the ED. Denies any sick contacts or recent travel.     ED Vitals: T 97.5 HR 95 /78 RR 18 SpO2 97% on room air  Labs: WBC 13.15, anemic with HB 8.1, INR 1.79  CXR: Right perihilar airspace disease, atherosclerotic aorta. No effusion.  EKG: Pending at the time of admission  In the ED Patient received tylenol 650mg x 1 (27 Dec 2019 15:33)            MEDICATIONS  (STANDING):  albuterol/ipratropium for Nebulization 3 milliLiter(s) Nebulizer every 8 hours  aspirin enteric coated 81 milliGRAM(s) Oral daily  atorvastatin 40 milliGRAM(s) Oral at bedtime  BACItracin   Ointment 1 Application(s) Topical three times a day  diltiazem    milliGRAM(s) Oral daily  hydrALAZINE 100 milliGRAM(s) Oral every 8 hours  lactated ringers. 1000 milliLiter(s) (50 mL/Hr) IV Continuous <Continuous>  metoprolol succinate  milliGRAM(s) Oral daily  multivitamin 1 Tablet(s) Oral daily  pantoprazole    Tablet 40 milliGRAM(s) Oral two times a day  piperacillin/tazobactam IVPB.. 3.375 Gram(s) IV Intermittent every 8 hours  senna 2 Tablet(s) Oral at bedtime  sodium chloride 0.65% Nasal 1 Spray(s) Both Nostrils four times a day  sucralfate 1 Gram(s) Oral two times a day    MEDICATIONS  (PRN):  aluminum hydroxide/magnesium hydroxide/simethicone Suspension 30 milliLiter(s) Oral every 6 hours PRN Dyspepsia  lidocaine   Patch 1 Patch Transdermal daily PRN back pain  melatonin 5 milliGRAM(s) Oral at bedtime PRN Insomnia  trimethobenzamide 300 milliGRAM(s) Oral three times a day PRN Nausea and/or Vomiting      Vital Signs Last 24 Hrs  T(C): 36.5 (30 Dec 2019 12:02), Max: 36.7 (29 Dec 2019 16:43)  T(F): 97.7 (30 Dec 2019 12:02), Max: 98.1 (29 Dec 2019 20:04)  HR: 67 (30 Dec 2019 12:02) (64 - 87)  BP: 144/72 (30 Dec 2019 12:02) (144/72 - 192/91)  BP(mean): --  RR: 18 (30 Dec 2019 12:02) (18 - 19)  SpO2: 97% (30 Dec 2019 12:02) (94% - 98%)      [PHYSICAL EXAM]  General: adult in NAD,  WN,  WD.  HEENT: clear oropharynx, anicteric sclera, pink conjunctivae.  Neck: supple, no masses.  CV: normal S1S2, no murmur, no rubs, no gallops.  Lungs: clear to auscultation, no wheezes, no rales, no rhonchi.  Abdomen: soft, non-tender, non-distended, no hepatosplenomegaly, normal BS, no guarding.  Ext: no clubbing, no cyanosis, no edema.  Skin: no rashes,  no petechiae, no venous stasis changes.  Neuro: alert and oriented X3  , no focal motor deficits.  LN: no SC ANYI.      [LABS:]                        10.5   9.00  )-----------( 209      ( 30 Dec 2019 06:38 )             33.3     12-30    143  |  111<H>  |  35<H>  ----------------------------<  107<H>  3.5   |  25  |  1.20    Ca    8.7      30 Dec 2019 06:38    TPro  7.1  /  Alb  3.3  /  TBili  1.7<H>  /  DBili  .30<H>  /  AST  16  /  ALT  15  /  AlkPhos  63  12-30    PT/INR - ( 30 Dec 2019 06:38 )   PT: 15.9 sec;   INR: 1.39 ratio                       [RADIOLOGY STUDIES:]

## 2019-12-30 NOTE — GOALS OF CARE CONVERSATION - ADVANCED CARE PLANNING - CONVERSATION DETAILS
Spoke to pt and her daughter about advance directives. Explined resuscitation . They both state that they understand and want ti discuss with whole family before making a decision

## 2019-12-30 NOTE — PROGRESS NOTE ADULT - PROBLEM SELECTOR PLAN 1
s/p IVF challenge - am Cr and LABS pending -   [facility] rx regimen - BP control - valvular heart disease - hx of CVA    epistaxis - ENT eval noted - nasal spray -   Heme eval noted  Emphysema - CT chest reviewed - poss PNA - Procalcitonin Noted -   cont Zosyn - min of 5 days duration and cont NEBS tid for COPD Emphysema  monitor Hgb  monitor vs and HD and Sat  pt is on room air  assist with ADL  out of bed with assist  will follow  discussed with daughter.

## 2019-12-30 NOTE — PROGRESS NOTE ADULT - ASSESSMENT
Anemia probably multfactorial in etiology.  Has had an acute nosebleed that appears to have resolved with stable hgb approx. however on review of labs it is noted her MCV has decreased but ferritin is 106, Sat 7%.   s/p 1U PRBC txfusion 12/29/19.     Lethargy of ?etiology being evaluated and is slowly improving improving  On IV Zosyn.   More alert today      Hgb 10 since transfusion.     has nausea sx, had dark emesis.   hx of gallstones.   Elevated Tbili.   Recent candida on EGD.     Recommendations:  Was on coumadin as per cardiology  Currently on ASA 81mg    Cardiac disease mgmt per cardiology: Lipitor 40mg, ASA 81mg, hydralazine 100mg TID, metoprolol 150mg. , Diltazem 240mg.     GI sx mgmt per gastroenterology: protonix, Sulcarfate. Maalox.  trimethobenzamide     follow CBC  Outpt followup in office for further tx, if need for tx for other causes of anemia.     On Zosyn.     discussed with patient and dtr.   Will sign off on case for now.   Follow in office post DC, in Jan

## 2019-12-30 NOTE — PROGRESS NOTE ADULT - PROBLEM SELECTOR PLAN 8
Chronic, stable  -Continue metoprolol, diltiazem, and hydralazine 25mg TID with hold parameters  -DASH/TLC diet  -routine hemodynamic monitoring Chronic, stable  -Continue metoprolol, diltiazem, and hydralazine 25mg TID with hold parameters  -DASH/TLC diet  -Treat underlying pain with percocet to lower BP  -routine hemodynamic monitoring Chronic, stable  - Continue metoprolol, diltiazem, and hydralazine 25mg TID with hold parameters  - DASH/TLC diet

## 2019-12-30 NOTE — PROGRESS NOTE ADULT - SUBJECTIVE AND OBJECTIVE BOX
Patient is a 84y old  Female who presents with a chief complaint of Epistaxis (30 Dec 2019 11:29)    ----  INTERVAL HPI/OVERNIGHT EVENTS: Pt seen and evaluated at the bedside. No acute overnight events occurred.     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  Osteoarthritis  Afib  PVD (peripheral vascular disease)  Neuropathy: (Right lower leg)  H/O cerebral aneurysm repair: brain clips  CVA (cerebral vascular accident): (&quot;Mini-stroke&quot;,1990&#x27;s)  Rheumatoid arthritis  Hyperlipidemia  Hypertension  S/P cataract surgery: (Left eye)  Elective surgery: (&quot;Twisted bowel&quot;, 2014)  Elective surgery: (Exision of cyst on liver, 1985)  S/P ORIF (open reduction internal fixation) fracture: Left hip fx (2012) &amp; R hip fx (2013)  H/O cerebral aneurysm repair: Brain clips (1978(  Renal stone: Cysto stent placement 10/1/2014  PVD (peripheral vascular disease): s/p RLE bypass x 3, most recent 3/2012 Right external iliac to PT bypass w/ PTFE (2012)  S/P FRED (total abdominal hysterectomy): (1987, Hx of &quot;ovarian cancer?&quot;)      FAMILY HISTORY:  No pertinent family history in first degree relatives      Allergies    No Known Allergies    Intolerances        ----  REVIEW OF SYSTEMS:  CONSTITUTIONAL: denies fever, chills, fatigue, weakness  HEENT: denies blurred vision, sore throat  SKIN: denies new lesions, rash  CARDIOVASCULAR: denies chest pain, chest pressure, palpitations  RESPIRATORY: denies shortness of breath, sputum production  GASTROINTESTINAL: denies nausea, vomiting, diarrhea, abdominal pain  GENITOURINARY: denies dysuria, discharge  NEUROLOGICAL: denies numbness, headache, focal weakness  MUSCULOSKELETAL: denies new joint pain, muscle aches  HEMATOLOGIC: denies gross bleeding, bruising  LYMPHATICS: denies enlarged lymph nodes, extremity swelling  PSYCHIATRIC: denies recent changes in anxiety, depression  ENDOCRINOLOGIC: denies sweating, cold or heat intolerance    ----  PHYSICAL EXAM:  GENERAL: patient appears well, no acute distress, appropriately interactive  EYES: sclera clear, no exudates  ENMT: oropharynx clear without erythema, moist mucous membranes  NECK: supple, soft, no thyromegaly noted  LUNGS: good air entry bilaterally, clear to auscultation, symmetric breath sounds, no wheezing or rhonchi appreciated  HEART: soft S1/S2, regular rate and rhythm, no murmurs noted, no noted edema to b/l LE  GASTROINTESTINAL: abdomen is soft, nontender, nondistended, normoactive bowel sounds, no palpable masses  INTEGUMENT: good skin turgor, appropriate for ethnicity, appears well perfused, no jaundice noted  MUSCULOSKELETAL: no clubbing or cyanosis, no obvious deformity  NEUROLOGIC: awake, alert, oriented x3, good muscle tone in 4 extremities, no obvious sensory deficits  PSYCHIATRIC: mood is good, affect is congruent with mood, linear and logical thought process  HEME/LYMPH: no palpable supraclavicular nodules, no obvious ecchymosis     T(C): 36.5 (12-30-19 @ 12:02), Max: 36.8 (12-29-19 @ 13:00)  HR: 67 (12-30-19 @ 12:02) (64 - 87)  BP: 144/72 (12-30-19 @ 12:02) (144/72 - 192/91)  RR: 18 (12-30-19 @ 12:02) (18 - 19)  SpO2: 97% (12-30-19 @ 12:02) (94% - 98%)  Wt(kg): --    ----  I&O's Summary    29 Dec 2019 07:01  -  30 Dec 2019 07:00  --------------------------------------------------------  IN: 300 mL / OUT: 0 mL / NET: 300 mL        LABS:                        10.5   9.00  )-----------( 209      ( 30 Dec 2019 06:38 )             33.3     12-30    143  |  111<H>  |  35<H>  ----------------------------<  107<H>  3.5   |  25  |  1.20    Ca    8.7      30 Dec 2019 06:38    TPro  7.1  /  Alb  3.3  /  TBili  1.7<H>  /  DBili  .30<H>  /  AST  16  /  ALT  15  /  AlkPhos  63  12-30    PT/INR - ( 30 Dec 2019 06:38 )   PT: 15.9 sec;   INR: 1.39 ratio             CAPILLARY BLOOD GLUCOSE                    ---- Patient is a 84y old  Female who presents with a chief complaint of Epistaxis (30 Dec 2019 11:29)    ----  INTERVAL HPI/OVERNIGHT EVENTS: Pt seen and evaluated at the bedside. Pt appears pale and frail. Pt slept very poorly last night due to new-onset back pain and diffuse abdominal pain. Pt admits to LE weakness, fatigue and nausea. Pt can only walk 10 feet. She is appropriate for rehab. However, she cannot be discharged to rehab as pt is medically dealing with GI issues. Pt denies any nasal discharge or epistaxis. Pt continues nasal saline humidification. Pt has high uncontrolled BP. Treat underlying pain with percocet in order to manage and optimize high BP. Due to history of aneurysm and abdominal pain, vascular has been consulted for an abdominal ultrasound to rule out any dissecting aneurysm.    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  Osteoarthritis  Afib  PVD (peripheral vascular disease)  Neuropathy: (Right lower leg)  H/O cerebral aneurysm repair: brain clips  CVA (cerebral vascular accident): (&quot;Mini-stroke&quot;,1990&#x27;s)  Rheumatoid arthritis  Hyperlipidemia  Hypertension  S/P cataract surgery: (Left eye)  Elective surgery: (&quot;Twisted bowel&quot;, 2014)  Elective surgery: (Exision of cyst on liver, 1985)  S/P ORIF (open reduction internal fixation) fracture: Left hip fx (2012) &amp; R hip fx (2013)  H/O cerebral aneurysm repair: Brain clips (1978(  Renal stone: Cysto stent placement 10/1/2014  PVD (peripheral vascular disease): s/p RLE bypass x 3, most recent 3/2012 Right external iliac to PT bypass w/ PTFE (2012)  S/P FRED (total abdominal hysterectomy): (1987, Hx of &quot;ovarian cancer?&quot;)      FAMILY HISTORY:  No pertinent family history in first degree relatives      Allergies    No Known Allergies    Intolerances        ----  REVIEW OF SYSTEMS:  CONSTITUTIONAL: admits to fatigue, LE weakness; denies fever or chills  HEENT: denies headache; admits to double vision; admits to impaired hearing; denies epistaxis; admits to dry lips and throat; admits to throat pain after feeding  NECK: denies neck pain or stiffness  SKIN: denies new lesions, rash or bruises  CARDIOVASCULAR: denies chest pain, chest pressure, palpitations, edema  RESPIRATORY: denies shortness of breath, sputum production  GASTROINTESTINAL: admits to abdominal pain, nausea and vomiting; denies diarrhea or constipation  GENITOURINARY: denies dysuria, discharge, frequency or hematuria  NEUROLOGICAL:  admits to numbness and tingling down right leg; admits to b/l LE weakness  MUSCULOSKELETAL: denies new joint pain, muscle aches  HEMATOLOGIC: denies gross bleeding, bruising  LYMPHATICS: denies enlarged lymph nodes, extremity swelling  PSYCHIATRIC: denies recent changes in mood  ENDOCRINOLOGIC: denies sweating, cold or heat intolerance    ----  PHYSICAL EXAM:  GENERAL: patient appears frail, pale, lethargic, thin habitus  HEAD: atraumatic, normocephalic  EYES: EOMI, PERRLA, sclera clear, no exudates, no conjunctival injection, no eye pain, no visual disturbances, no discharge  ENMT: no tonsillar erythema, exudates or enlargement; dry mucous membranes, missing upper and lower teeth, no lesions  NECK: supple, soft, no thyromegaly noted  LUNGS: good air entry bilaterally, clear to auscultation, symmetric breath sounds, no wheezing or rhonchi appreciated  HEART: soft S1/S2, regular rate and rhythm, no murmurs noted, no noted edema to b/l LE  GASTROINTESTINAL: abdomen is soft, diffusely tender, nondistended, normoactive bowel sounds, no palpable masses  INTEGUMENT: poor skin turgor, no jaundice noted  MUSCULOSKELETAL: bilateral arthritic changes in both hands  NEUROLOGIC: awake, alert, oriented x3, good muscle tone in 4 extremities, no obvious sensory deficits; 3/5 UE and LE weakness with active resistance  PSYCHIATRIC: mood and affect is good  HEME/LYMPH: no palpable supraclavicular nodules, no obvious ecchymosis     T(C): 36.5 (12-30-19 @ 12:02), Max: 36.8 (12-29-19 @ 13:00)  HR: 67 (12-30-19 @ 12:02) (64 - 87)  BP: 144/72 (12-30-19 @ 12:02) (144/72 - 192/91)  RR: 18 (12-30-19 @ 12:02) (18 - 19)  SpO2: 97% (12-30-19 @ 12:02) (94% - 98%)  Wt(kg): --    ----  I&O's Summary    29 Dec 2019 07:01  -  30 Dec 2019 07:00  --------------------------------------------------------  IN: 300 mL / OUT: 0 mL / NET: 300 mL        LABS:                        10.5   9.00  )-----------( 209      ( 30 Dec 2019 06:38 )             33.3     12-30    143  |  111<H>  |  35<H>  ----------------------------<  107<H>  3.5   |  25  |  1.20    Ca    8.7      30 Dec 2019 06:38    TPro  7.1  /  Alb  3.3  /  TBili  1.7<H>  /  DBili  .30<H>  /  AST  16  /  ALT  15  /  AlkPhos  63  12-30    PT/INR - ( 30 Dec 2019 06:38 )   PT: 15.9 sec;   INR: 1.39 ratio             CAPILLARY BLOOD GLUCOSE                    ---- Patient is a 84y old  Female who presents with a chief complaint of Epistaxis (30 Dec 2019 11:29)    ----  INTERVAL HPI/OVERNIGHT EVENTS: Pt seen and evaluated at the bedside.       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  Osteoarthritis  Afib  PVD (peripheral vascular disease)  Neuropathy: (Right lower leg)  H/O cerebral aneurysm repair: brain clips  CVA (cerebral vascular accident): (&quot;Mini-stroke&quot;,1990&#x27;s)  Rheumatoid arthritis  Hyperlipidemia  Hypertension  S/P cataract surgery: (Left eye)  Elective surgery: (&quot;Twisted bowel&quot;, 2014)  Elective surgery: (Exision of cyst on liver, 1985)  S/P ORIF (open reduction internal fixation) fracture: Left hip fx (2012) &amp; R hip fx (2013)  H/O cerebral aneurysm repair: Brain clips (1978(  Renal stone: Cysto stent placement 10/1/2014  PVD (peripheral vascular disease): s/p RLE bypass x 3, most recent 3/2012 Right external iliac to PT bypass w/ PTFE (2012)  S/P FRED (total abdominal hysterectomy): (1987, Hx of &quot;ovarian cancer?&quot;)      FAMILY HISTORY:  No pertinent family history in first degree relatives      Allergies    No Known Allergies    Intolerances        ----  REVIEW OF SYSTEMS:  CONSTITUTIONAL: admits to fatigue, LE weakness; denies fever or chills  HEENT: denies headache; admits to double vision; admits to impaired hearing; denies epistaxis; admits to dry lips and throat; admits to throat pain after feeding  NECK: denies neck pain or stiffness  SKIN: denies new lesions, rash or bruises  CARDIOVASCULAR: denies chest pain, chest pressure, palpitations, edema  RESPIRATORY: denies shortness of breath, sputum production  GASTROINTESTINAL: admits to abdominal pain, nausea and vomiting; denies diarrhea or constipation  GENITOURINARY: denies dysuria, discharge, frequency or hematuria  NEUROLOGICAL:  admits to numbness and tingling down right leg; admits to b/l LE weakness  MUSCULOSKELETAL: denies new joint pain, muscle aches  HEMATOLOGIC: denies gross bleeding, bruising  LYMPHATICS: denies enlarged lymph nodes, extremity swelling  PSYCHIATRIC: denies recent changes in mood  ENDOCRINOLOGIC: denies sweating, cold or heat intolerance    ----  PHYSICAL EXAM:  GENERAL: patient appears frail, pale, lethargic, thin habitus  HEAD: atraumatic, normocephalic  EYES: EOMI, PERRLA, sclera clear, no exudates, no conjunctival injection, no eye pain, no visual disturbances, no discharge  ENMT: no tonsillar erythema, exudates or enlargement; dry mucous membranes, missing upper and lower teeth, no lesions  NECK: supple, soft, no thyromegaly noted  LUNGS: good air entry bilaterally, clear to auscultation, symmetric breath sounds, no wheezing or rhonchi appreciated  HEART: soft S1/S2, regular rate and rhythm, no murmurs noted, no noted edema to b/l LE  GASTROINTESTINAL: abdomen is soft, diffusely tender, nondistended, normoactive bowel sounds, no palpable masses  INTEGUMENT: poor skin turgor, no jaundice noted  MUSCULOSKELETAL: bilateral arthritic changes in both hands  NEUROLOGIC: awake, alert, oriented x3, good muscle tone in 4 extremities, no obvious sensory deficits; 3/5 UE and LE weakness with active resistance  PSYCHIATRIC: mood and affect is good  HEME/LYMPH: no palpable supraclavicular nodules, no obvious ecchymosis     T(C): 36.5 (12-30-19 @ 12:02), Max: 36.8 (12-29-19 @ 13:00)  HR: 67 (12-30-19 @ 12:02) (64 - 87)  BP: 144/72 (12-30-19 @ 12:02) (144/72 - 192/91)  RR: 18 (12-30-19 @ 12:02) (18 - 19)  SpO2: 97% (12-30-19 @ 12:02) (94% - 98%)  Wt(kg): --    ----  I&O's Summary    29 Dec 2019 07:01  -  30 Dec 2019 07:00  --------------------------------------------------------  IN: 300 mL / OUT: 0 mL / NET: 300 mL        LABS:                        10.5   9.00  )-----------( 209      ( 30 Dec 2019 06:38 )             33.3     12-30    143  |  111<H>  |  35<H>  ----------------------------<  107<H>  3.5   |  25  |  1.20    Ca    8.7      30 Dec 2019 06:38    TPro  7.1  /  Alb  3.3  /  TBili  1.7<H>  /  DBili  .30<H>  /  AST  16  /  ALT  15  /  AlkPhos  63  12-30    PT/INR - ( 30 Dec 2019 06:38 )   PT: 15.9 sec;   INR: 1.39 ratio             CAPILLARY BLOOD GLUCOSE                    ---- Patient is a 84y old  Female who presents with a chief complaint of Epistaxis (30 Dec 2019 11:29)    ----  INTERVAL HPI/OVERNIGHT EVENTS: Pt seen and evaluated at the bedside.  No acute overnight events occurred. Patient with back pain over night, treated with lidocaine patch with out improvement. Patient also with some mild abdominal pain, however she states he back pain is worse. Denies nausea, vomiting, diarrhea constipation, chest pain, shortness of breath.         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  Osteoarthritis  Afib  PVD (peripheral vascular disease)  Neuropathy: (Right lower leg)  H/O cerebral aneurysm repair: brain clips  CVA (cerebral vascular accident): (&quot;Mini-stroke&quot;,1990&#x27;s)  Rheumatoid arthritis  Hyperlipidemia  Hypertension  S/P cataract surgery: (Left eye)  Elective surgery: (&quot;Twisted bowel&quot;, 2014)  Elective surgery: (Exision of cyst on liver, 1985)  S/P ORIF (open reduction internal fixation) fracture: Left hip fx (2012) &amp; R hip fx (2013)  H/O cerebral aneurysm repair: Brain clips (1978(  Renal stone: Cysto stent placement 10/1/2014  PVD (peripheral vascular disease): s/p RLE bypass x 3, most recent 3/2012 Right external iliac to PT bypass w/ PTFE (2012)  S/P FRED (total abdominal hysterectomy): (1987, Hx of &quot;ovarian cancer?&quot;)      FAMILY HISTORY:  No pertinent family history in first degree relatives      Allergies    No Known Allergies    Intolerances        ----  REVIEW OF SYSTEMS:  Constitutional: denies fever, chills, sweating  HEENT: denies headache, dizziness, or lightheadedness  Respiratory: denies SOB, cough, or wheezing  Cardiovascular: denies CP, palpitations  Gastrointestinal: admits abdominal pain, denies nausea, vomiting, diarrhea, constipation, abdominal pain, or bloody stools  Genitourinary: denies painful urination, increased frequency, urgency, or bloody urine  Skin/Breast: denies rashes or itching  Musculoskeletal: admits back pain, denies muscle aches, joint swelling, or muscle weakness  Neurologic: denies loss of sensation, numbness, or tingling  ROS negative except as noted above  ----  PHYSICAL EXAM:  GENERAL: patient appears frail, pale, lethargic, thin habitus  HEAD: atraumatic, normocephalic  EYES: EOMI, PERRLA, sclera clear, no exudates, no conjunctival injection, no eye pain, no visual disturbances, no discharge  ENMT: no tonsillar erythema, exudates or enlargement; dry mucous membranes, no lesions  NECK: supple, soft, no thyromegaly noted  LUNGS: good air entry bilaterally, clear to auscultation, symmetric breath sounds, no wheezing or rhonchi appreciated  HEART: soft S1/S2, regular rate and rhythm, no murmurs noted, no noted edema to b/l LE  GASTROINTESTINAL: abdomen is soft, diffusely tender, nondistended, normoactive bowel sounds, no palpable masses  MUSCULOSKELETAL: bilateral arthritic changes in both hands  NEUROLOGIC: awake, alert, oriented x3, good muscle tone in 4 extremities, no obvious sensory deficits; 3/5 UE and LE weakness with active resistance  PSYCHIATRIC: mood and affect is good  HEME/LYMPH: no palpable supraclavicular nodules, no obvious ecchymosis     T(C): 36.5 (12-30-19 @ 12:02), Max: 36.8 (12-29-19 @ 13:00)  HR: 67 (12-30-19 @ 12:02) (64 - 87)  BP: 144/72 (12-30-19 @ 12:02) (144/72 - 192/91)  RR: 18 (12-30-19 @ 12:02) (18 - 19)  SpO2: 97% (12-30-19 @ 12:02) (94% - 98%)  Wt(kg): --    ----  I&O's Summary    29 Dec 2019 07:01  -  30 Dec 2019 07:00  --------------------------------------------------------  IN: 300 mL / OUT: 0 mL / NET: 300 mL        LABS:                        10.5   9.00  )-----------( 209      ( 30 Dec 2019 06:38 )             33.3     12-30    143  |  111<H>  |  35<H>  ----------------------------<  107<H>  3.5   |  25  |  1.20    Ca    8.7      30 Dec 2019 06:38    TPro  7.1  /  Alb  3.3  /  TBili  1.7<H>  /  DBili  .30<H>  /  AST  16  /  ALT  15  /  AlkPhos  63  12-30    PT/INR - ( 30 Dec 2019 06:38 )   PT: 15.9 sec;   INR: 1.39 ratio             CAPILLARY BLOOD GLUCOSE                    ----

## 2019-12-30 NOTE — PROGRESS NOTE ADULT - ASSESSMENT
n/v/epigastric pain  anemia, multifactorial  epistaxis  htn n/v/epigastric pain  anemia, multifactorial  epistaxis  htn    rec ct ap w iv con for further eval given persistence of symptoms   called lab to fractionate lfts  repeat ekg to eval qtc  gentle ivf; cont ppi bid, carafate bid, maalox prn  cont tigan prn for n/v given qtc; dw pt and nursing  monitor cbc, transfuse prn  monitor exam/gi fxn, prn pain control  recent egd w candida esophagitis/gastritis/bile reflux  will follow, further recs pending above n/v/epigastric pain  anemia, multifactorial  epistaxis  htn    rec ct ap w iv con for further eval given persistence of symptoms   called lab to fractionate lfts  repeat ekg to eval qtc  gentle ivf; cont ppi bid, carafate bid, maalox prn  cont tigan prn for n/v given qtc; dw pt and nursing  monitor cbc, transfuse prn  monitor exam/gi fxn, prn pain control  recent egd w candida esophagitis/gastritis/bile reflux  bp management as per primary  will follow, further recs pending above

## 2019-12-30 NOTE — PROGRESS NOTE ADULT - PROBLEM SELECTOR PLAN 5
-sec to epistaxis  -empiric coverage  -apprec pulm recs - 2/2 to epistaxis  - continue zosyn at this time  - Pulmonology, Dr. Rene following

## 2019-12-30 NOTE — PROGRESS NOTE ADULT - PROBLEM SELECTOR PLAN 6
-sec to epistasix  -apprec gi recs  -monitor course -sec to epistasix  -apprec gi recs  -monitor course  -possible GERD  -hx of candidal esophagitis  =Bile Reflux in recent EGD -now with new onset severe back overnight,  - will give 1 time dose of percocet 5mg for severe pain, patient states she takes oxy at home for her arthritis  - history of infrarenal AAA on CTA abdomen in 6/19 (unknown size) will obtain urgent abominal ultrasound  - CT abdomen and pelvis with IV contrast ordered by GI for continued abdominal pain  - Vascular surgery consulted for AAA, Dr. Sánchez  - GI, Dr. Lassiter following

## 2019-12-30 NOTE — PROGRESS NOTE ADULT - SUBJECTIVE AND OBJECTIVE BOX
INTERVAL HPI/OVERNIGHT EVENTS:  pt seen and examined  c/o persistent nausea and vomiting yesterday  abd pain somewhat improved  did not eat yesterday  per overnight rn, pt had small episode of dark colored emesis overnight  did not receive prn tigan yesterday/overnight per mar  axr noted; sp 1u prbc yesterday    MEDICATIONS  (STANDING):  albuterol/ipratropium for Nebulization 3 milliLiter(s) Nebulizer every 8 hours  aspirin enteric coated 81 milliGRAM(s) Oral daily  atorvastatin 40 milliGRAM(s) Oral at bedtime  BACItracin   Ointment 1 Application(s) Topical three times a day  diltiazem    milliGRAM(s) Oral daily  hydrALAZINE 100 milliGRAM(s) Oral every 8 hours  lactated ringers. 1000 milliLiter(s) (50 mL/Hr) IV Continuous <Continuous>  metoprolol succinate  milliGRAM(s) Oral daily  multivitamin 1 Tablet(s) Oral daily  pantoprazole    Tablet 40 milliGRAM(s) Oral two times a day  piperacillin/tazobactam IVPB.. 3.375 Gram(s) IV Intermittent every 8 hours  senna 2 Tablet(s) Oral at bedtime  sodium chloride 0.65% Nasal 1 Spray(s) Both Nostrils four times a day  sucralfate 1 Gram(s) Oral two times a day    MEDICATIONS  (PRN):  aluminum hydroxide/magnesium hydroxide/simethicone Suspension 30 milliLiter(s) Oral every 6 hours PRN Dyspepsia  lidocaine   Patch 1 Patch Transdermal daily PRN back pain  melatonin 5 milliGRAM(s) Oral at bedtime PRN Insomnia  trimethobenzamide 300 milliGRAM(s) Oral three times a day PRN Nausea and/or Vomiting      Allergies    No Known Allergies    Intolerances        Review of Systems:    General:  dec po  Eyes:  Good vision, no reported pain  ENT:  No sore throat, pain, runny nose, dysphagia  CV:  No pain, palpitations, hypo/hypertension  Resp:  No dyspnea, cough, tachypnea, wheezing  GI:  +pain, +nausea, +vomiting, No diarrhea, No constipation, No weight loss, No fever, No pruritis, No rectal bleeding, No melena, No dysphagia  :  No pain, bleeding, incontinence, nocturia  Muscle:  No pain, weakness  Neuro:  No weakness, tingling, memory problems  Psych:  No fatigue, insomnia, mood problems, depression  Endocrine:  No polyuria, polydypsia, cold/heat intolerance  Heme:  No petechiae, ecchymosis, easy bruisability  Skin:  No rash, tattoos, scars, edema      Vital Signs Last 24 Hrs  T(C): 36.7 (30 Dec 2019 07:19), Max: 36.8 (29 Dec 2019 13:00)  T(F): 98 (30 Dec 2019 07:19), Max: 98.2 (29 Dec 2019 13:00)  HR: 74 (30 Dec 2019 07:49) (64 - 87)  BP: 190/90 (30 Dec 2019 08:04) (171/76 - 192/91)  BP(mean): --  RR: 18 (30 Dec 2019 07:19) (18 - 19)  SpO2: 96% (30 Dec 2019 07:49) (94% - 98%)    PHYSICAL EXAM:    General:  lying in bed  HEENT:  NC/AT  Abdomen:  Soft, ttp epigastric region nd  Extremities:  no  edema  Neuro/Psych:  awake alert responds appropriately    LABS:                        10.5   9.00  )-----------( 209      ( 30 Dec 2019 06:38 )             33.3     12-30    143  |  111<H>  |  35<H>  ----------------------------<  107<H>  3.5   |  25  |  1.20    Ca    8.7      30 Dec 2019 06:38    TPro  7.1  /  Alb  3.3  /  TBili  1.7<H>  /  DBili  x   /  AST  16  /  ALT  15  /  AlkPhos  63  12-30    PT/INR - ( 30 Dec 2019 06:38 )   PT: 15.9 sec;   INR: 1.39 ratio               RADIOLOGY & ADDITIONAL TESTS:

## 2019-12-30 NOTE — PROGRESS NOTE ADULT - PROBLEM SELECTOR PLAN 2
-as above  -apprec hemat recs -as above  -Trend H/H  -monitor blood counts with coumadin recently restarted - as above, appropriate rise to 10.5 after 1 unit prbcs  - continue to trend CBC in AM  - Hematology/oncology consulted, Dr. Jaimes - anemia probably multfactorial in etiology.

## 2019-12-30 NOTE — PROGRESS NOTE ADULT - PROBLEM SELECTOR PLAN 1
stable  h/h slightly lower today 7.9  apprec ENT recs  apprec hemat recs  transfuse 1 unit prbc  cont coumadin as no active bleed and risks vs benefits for afib, daughter and patient agree  monitor inr stable  h/h slightly lower today 7.9  continue to use nasal saline humidification as per ENT  apprec hemat recs  transfuse 1 unit prbc  cont coumadin as no active bleed and risks vs benefits for afib, daughter and patient agree  monitor inr pt. denies any further episodes of epistaxis  - Hgb improved to 10.5 today, s/p 1 unit prbcs  - continue to use nasal saline humidification as per ENT  - will continue coumadin as no active bleed at this time and risks vs benefits for afib, daughter and patient agree  - continue to monitor inr, goal of 2-3

## 2019-12-30 NOTE — PHARMACOTHERAPY INTERVENTION NOTE - COMMENTS
Pt ordered 5mg of warfarin for afib with an INR of 1.39 today. Pt previously received 5mg (12/29) and 3mg (12/28) however takes 2.5mg Mon-Sat and 5mg on Sunday's at home. Discussed with Dr. Lucas and he agreed to decrease dose to 4mg tonight and trend INR tomorrow.     Date 	Previous Dose	INR   12/30 	4mg	              1.39  12/29	5mg	              1.13  12/28	3mg	              1.27  12/27	--	               --  12/26	--	              1.79

## 2019-12-30 NOTE — PROGRESS NOTE ADULT - PROBLEM SELECTOR PLAN 3
-sec to deconditionig  -apprec pt eval recommend bernarda when medically stable -secondary to deconditioning  -apprec pt eval recommend bernarda when medically stable  -Pt needs rehab services - pt. with continued weakness, secondary to deconditioning  - apprec pt eval recommend bernarda when medically stable

## 2019-12-30 NOTE — PROGRESS NOTE ADULT - PROBLEM SELECTOR PLAN 10
IMPROVE VTE Individual Risk Assessment          RISK                                                          Points  [  ] Previous VTE                                                3  [  ] Thrombophilia                                            2  [  ] Lower limb paralysis                                  2        (unable to hold up >15 seconds)    [  ] Current Cancer                                            2         (within 6 months)  [  ] Immobilization > 24 hrs                             1  [  ] ICU/CCU stay > 24 hours                           1  [ x ] Age > 60                                                        1    IMPROVE VTE Score: 1  AC with coumadin DVT prophylaxis: on full dose AC with coumadin

## 2019-12-30 NOTE — PROGRESS NOTE ADULT - ASSESSMENT
84/F w/ PMHx of afib (coumadin), CVA and hx of aneurysm repair, HTN, HLD, mitral valve prolapse, PVD s/p stents x 3 (RLE), rheumatoid arthritis, cataracts of the L eye BIBEMS to ED for evaluation of epistaxis, which resolved without intervention. Noted to have ambulatory difficulty, admit to [facility] 84/F w/ PMHx of afib (coumadin), CVA and hx of aneurysm repair, HTN, HLD, mitral valve prolapse, PVD s/p stents x 3 (RLE), rheumatoid arthritis, cataracts of the L eye BIBEMS to ED for evaluation of epistaxis, which resolved without intervention, admitted for weakness. Now s/p 1 unit prbcs for acute blood loss anemia.

## 2019-12-30 NOTE — PROGRESS NOTE ADULT - ASSESSMENT
84/F w/ PMHx of afib (coumadin), CVA and hx of aneurysm repair, HTN, HLD, mitral valve prolapse, PVD s/p stents x 3 (RLE), rheumatoid arthritis, cataracts of the L eye BIBEMS to ED for evaluation of epistaxis.    Uncontrolled HTN/Hypertensive Emergency  - BP remains uncontrolled with elevations up to 190.  Continue IV hydralazine prn.    - Increase Hydralazine to 100 q8H   - Increase Cardizem CD  - No evidence of active ischemia or volume overload    Atrial fibrillation  - She is currently in NSR  - She is now being dosed with Coumadin.  Her H/H are stable.  Will monitor closely for further bleeding  - Continue to dose daily to keep INR 2-3.  Today's = 1.39  - Continue diltiazem at a higher dose and Toprol 150 QD  - Monitor and replete lytes.  Replete K>4 and Mag>2    Hx of CVA/PVD  s/p stent  - Continue aspirin 81 QD  - Continue statin drug    To follow closely with you while admitted    Isabel Quintero DNP, NP-C  Cardiology   [facility] #[phone]

## 2019-12-30 NOTE — PROGRESS NOTE ADULT - PROBLEM SELECTOR PLAN 4
Chronic, stable  --cont ac warfarin  -trend inr to goal 2-3  no bridginig required as per cardio nor hemat Chronic, stable  -cont coumadin  -trend INR goal to 2-3  no bridginig required as per cardio nor hemat Chronic, stable  -cont coumadin  -trend INR goal to 2-3

## 2019-12-31 LAB
ANION GAP SERPL CALC-SCNC: 9 MMOL/L — SIGNIFICANT CHANGE UP (ref 5–17)
APTT BLD: 29.4 SEC — SIGNIFICANT CHANGE UP (ref 28.5–37)
BUN SERPL-MCNC: 34 MG/DL — HIGH (ref 7–23)
CALCIUM SERPL-MCNC: 8.6 MG/DL — SIGNIFICANT CHANGE UP (ref 8.5–10.1)
CHLORIDE SERPL-SCNC: 110 MMOL/L — HIGH (ref 96–108)
CO2 SERPL-SCNC: 24 MMOL/L — SIGNIFICANT CHANGE UP (ref 22–31)
CREAT SERPL-MCNC: 1.2 MG/DL — SIGNIFICANT CHANGE UP (ref 0.5–1.3)
GLUCOSE SERPL-MCNC: 99 MG/DL — SIGNIFICANT CHANGE UP (ref 70–99)
HCT VFR BLD CALC: 33.7 % — LOW (ref 34.5–45)
HGB BLD-MCNC: 10.6 G/DL — LOW (ref 11.5–15.5)
INR BLD: 1.52 RATIO — HIGH (ref 0.88–1.16)
MCHC RBC-ENTMCNC: 27.2 PG — SIGNIFICANT CHANGE UP (ref 27–34)
MCHC RBC-ENTMCNC: 31.5 GM/DL — LOW (ref 32–36)
MCV RBC AUTO: 86.6 FL — SIGNIFICANT CHANGE UP (ref 80–100)
NRBC # BLD: 0 /100 WBCS — SIGNIFICANT CHANGE UP (ref 0–0)
PLATELET # BLD AUTO: 193 K/UL — SIGNIFICANT CHANGE UP (ref 150–400)
POTASSIUM SERPL-MCNC: 3.3 MMOL/L — LOW (ref 3.5–5.3)
POTASSIUM SERPL-SCNC: 3.3 MMOL/L — LOW (ref 3.5–5.3)
PROTHROM AB SERPL-ACNC: 17.6 SEC — HIGH (ref 10–12.9)
RBC # BLD: 3.89 M/UL — SIGNIFICANT CHANGE UP (ref 3.8–5.2)
RBC # FLD: 17 % — HIGH (ref 10.3–14.5)
SODIUM SERPL-SCNC: 143 MMOL/L — SIGNIFICANT CHANGE UP (ref 135–145)
WBC # BLD: 7.37 K/UL — SIGNIFICANT CHANGE UP (ref 3.8–10.5)
WBC # FLD AUTO: 7.37 K/UL — SIGNIFICANT CHANGE UP (ref 3.8–10.5)

## 2019-12-31 PROCEDURE — 99232 SBSQ HOSP IP/OBS MODERATE 35: CPT

## 2019-12-31 PROCEDURE — 99233 SBSQ HOSP IP/OBS HIGH 50: CPT | Mod: GC

## 2019-12-31 RX ORDER — WARFARIN SODIUM 2.5 MG/1
5 TABLET ORAL ONCE
Refills: 0 | Status: COMPLETED | OUTPATIENT
Start: 2019-12-31 | End: 2019-12-31

## 2019-12-31 RX ORDER — PIPERACILLIN AND TAZOBACTAM 4; .5 G/20ML; G/20ML
3.38 INJECTION, POWDER, LYOPHILIZED, FOR SOLUTION INTRAVENOUS EVERY 8 HOURS
Refills: 0 | Status: COMPLETED | OUTPATIENT
Start: 2019-12-31 | End: 2020-01-01

## 2019-12-31 RX ORDER — OXYCODONE AND ACETAMINOPHEN 5; 325 MG/1; MG/1
1 TABLET ORAL ONCE
Refills: 0 | Status: DISCONTINUED | OUTPATIENT
Start: 2019-12-31 | End: 2019-12-31

## 2019-12-31 RX ORDER — POTASSIUM CHLORIDE 20 MEQ
40 PACKET (EA) ORAL ONCE
Refills: 0 | Status: COMPLETED | OUTPATIENT
Start: 2019-12-31 | End: 2019-12-31

## 2019-12-31 RX ADMIN — Medication 1 SPRAY(S): at 13:11

## 2019-12-31 RX ADMIN — Medication 40 MILLIEQUIVALENT(S): at 08:40

## 2019-12-31 RX ADMIN — Medication 150 MILLIGRAM(S): at 05:40

## 2019-12-31 RX ADMIN — Medication 1 GRAM(S): at 05:40

## 2019-12-31 RX ADMIN — SENNA PLUS 2 TABLET(S): 8.6 TABLET ORAL at 22:42

## 2019-12-31 RX ADMIN — Medication 3 MILLILITER(S): at 14:18

## 2019-12-31 RX ADMIN — OXYCODONE AND ACETAMINOPHEN 1 TABLET(S): 5; 325 TABLET ORAL at 23:29

## 2019-12-31 RX ADMIN — Medication 1 SPRAY(S): at 18:09

## 2019-12-31 RX ADMIN — Medication 81 MILLIGRAM(S): at 13:10

## 2019-12-31 RX ADMIN — Medication 1 TABLET(S): at 13:10

## 2019-12-31 RX ADMIN — Medication 1 APPLICATION(S): at 22:42

## 2019-12-31 RX ADMIN — Medication 100 MILLIGRAM(S): at 05:40

## 2019-12-31 RX ADMIN — PIPERACILLIN AND TAZOBACTAM 25 GRAM(S): 4; .5 INJECTION, POWDER, LYOPHILIZED, FOR SOLUTION INTRAVENOUS at 07:58

## 2019-12-31 RX ADMIN — Medication 1 GRAM(S): at 17:15

## 2019-12-31 RX ADMIN — Medication 1 APPLICATION(S): at 05:41

## 2019-12-31 RX ADMIN — WARFARIN SODIUM 5 MILLIGRAM(S): 2.5 TABLET ORAL at 22:42

## 2019-12-31 RX ADMIN — Medication 100 MILLIGRAM(S): at 13:10

## 2019-12-31 RX ADMIN — PANTOPRAZOLE SODIUM 40 MILLIGRAM(S): 20 TABLET, DELAYED RELEASE ORAL at 17:15

## 2019-12-31 RX ADMIN — OXYCODONE AND ACETAMINOPHEN 1 TABLET(S): 5; 325 TABLET ORAL at 22:43

## 2019-12-31 RX ADMIN — Medication 1 SPRAY(S): at 05:41

## 2019-12-31 RX ADMIN — PIPERACILLIN AND TAZOBACTAM 25 GRAM(S): 4; .5 INJECTION, POWDER, LYOPHILIZED, FOR SOLUTION INTRAVENOUS at 16:35

## 2019-12-31 RX ADMIN — Medication 1 APPLICATION(S): at 13:10

## 2019-12-31 RX ADMIN — Medication 100 MILLIGRAM(S): at 22:42

## 2019-12-31 RX ADMIN — ATORVASTATIN CALCIUM 40 MILLIGRAM(S): 80 TABLET, FILM COATED ORAL at 22:43

## 2019-12-31 RX ADMIN — PANTOPRAZOLE SODIUM 40 MILLIGRAM(S): 20 TABLET, DELAYED RELEASE ORAL at 05:40

## 2019-12-31 RX ADMIN — OXYCODONE AND ACETAMINOPHEN 1 TABLET(S): 5; 325 TABLET ORAL at 08:40

## 2019-12-31 RX ADMIN — Medication 3 MILLILITER(S): at 08:19

## 2019-12-31 RX ADMIN — OXYCODONE AND ACETAMINOPHEN 1 TABLET(S): 5; 325 TABLET ORAL at 09:10

## 2019-12-31 RX ADMIN — Medication 240 MILLIGRAM(S): at 05:40

## 2019-12-31 NOTE — PROGRESS NOTE ADULT - PROBLEM SELECTOR PLAN 1
pt. denies any further episodes of epistaxis  - Hgb improved at 10.6 today  - continue to use nasal saline humidification as per ENT  - will continue coumadin as no active bleed at this time and risks vs benefits for afib, daughter and patient agree  - continue to dosing by inr, goal of 2-3

## 2019-12-31 NOTE — PROGRESS NOTE ADULT - PROBLEM SELECTOR PLAN 1
seen and examined, Vascular eval noted, ct abd reviewed, GOC noted and documented, full code -   [facility] rx regimen - BP control - valvular heart disease - hx of CVA    epistaxis - ENT eval noted - nasal spray -   Heme eval noted  Emphysema - CT chest reviewed - poss PNA - Procalcitonin Noted -   cont Zosyn - min of 5 days duration and cont NEBS tid for COPD Emphysema  monitor Hgb  monitor vs and HD and Sat  pt is on room air  assist with ADL  out of bed with assist

## 2019-12-31 NOTE — PROGRESS NOTE ADULT - PROBLEM SELECTOR PLAN 6
-now with new onset severe back overnight,  - will give 1 time dose of percocet 5mg for severe pain, patient states she takes oxy at home for her arthritis  - history of infrarenal AAA on CTA abdomen in 6/19 (unknown size) will obtain urgent abominal ultrasound  - CT abdomen and pelvis with IV contrast ordered by GI for continued abdominal pain  - Vascular surgery consulted for AAA, Dr. Sánchez  - GI, Dr. Lassiter following

## 2019-12-31 NOTE — PROGRESS NOTE ADULT - SUBJECTIVE AND OBJECTIVE BOX
[facility] -- Dahiana Bañuelos, Toney Horner, Truman Falcon Savella  Office # [phone]      Follow Up:    HTN    Subjective/Observations:     Seen at bedside reports 10/10 back pain, denies dizziness palpitations nausea headache chest pain or palpitations    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  Osteoarthritis  Afib  PVD (peripheral vascular disease)  Neuropathy: (Right lower leg)  H/O cerebral aneurysm repair: brain clips  CVA (cerebral vascular accident): (&quot;Mini-stroke&quot;,1990&#x27;s)  Rheumatoid arthritis  Hyperlipidemia  Hypertension  S/P cataract surgery: (Left eye)  Elective surgery: (&quot;Twisted bowel&quot;, 2014)  Elective surgery: (Exision of cyst on liver, 1985)  S/P ORIF (open reduction internal fixation) fracture: Left hip fx (2012) &amp; R hip fx (2013)  H/O cerebral aneurysm repair: Brain clips (1978(  Renal stone: Cysto stent placement 10/1/2014  PVD (peripheral vascular disease): s/p RLE bypass x 3, most recent 3/2012 Right external iliac to PT bypass w/ PTFE (2012)  S/P FRED (total abdominal hysterectomy): (1987, Hx of &quot;ovarian cancer?&quot;)      MEDICATIONS  (STANDING):  albuterol/ipratropium for Nebulization 3 milliLiter(s) Nebulizer every 8 hours  aspirin enteric coated 81 milliGRAM(s) Oral daily  atorvastatin 40 milliGRAM(s) Oral at bedtime  BACItracin   Ointment 1 Application(s) Topical three times a day  diltiazem    milliGRAM(s) Oral daily  hydrALAZINE 100 milliGRAM(s) Oral every 8 hours  lactated ringers. 1000 milliLiter(s) (50 mL/Hr) IV Continuous <Continuous>  metoprolol succinate  milliGRAM(s) Oral daily  multivitamin 1 Tablet(s) Oral daily  oxycodone    5 mG/acetaminophen 325 mG 1 Tablet(s) Oral once  pantoprazole    Tablet 40 milliGRAM(s) Oral two times a day  piperacillin/tazobactam IVPB.. 3.375 Gram(s) IV Intermittent every 8 hours  potassium chloride    Tablet ER 40 milliEquivalent(s) Oral once  senna 2 Tablet(s) Oral at bedtime  sodium chloride 0.65% Nasal 1 Spray(s) Both Nostrils four times a day  sucralfate 1 Gram(s) Oral two times a day    MEDICATIONS  (PRN):  aluminum hydroxide/magnesium hydroxide/simethicone Suspension 30 milliLiter(s) Oral every 6 hours PRN Dyspepsia  lidocaine   Patch 1 Patch Transdermal daily PRN back pain  melatonin 5 milliGRAM(s) Oral at bedtime PRN Insomnia  trimethobenzamide 300 milliGRAM(s) Oral three times a day PRN Nausea and/or Vomiting      Allergies    No Known Allergies    Intolerances        Vital Signs Last 24 Hrs  T(C): 36.5 (31 Dec 2019 08:27), Max: 37 (30 Dec 2019 19:36)  T(F): 97.7 (31 Dec 2019 08:27), Max: 98.6 (30 Dec 2019 19:36)  HR: 77 (31 Dec 2019 08:27) (61 - 78)  BP: 172/80 (31 Dec 2019 08:27) (122/63 - 182/85)  BP(mean): --  RR: 18 (31 Dec 2019 08:27) (18 - 20)  SpO2: 96% (31 Dec 2019 08:27) (95% - 97%)    I&O's Summary    30 Dec 2019 07:01  -  31 Dec 2019 07:00  --------------------------------------------------------  IN: 0 mL / OUT: 450 mL / NET: -450 mL    31 Dec 2019 07:01  -  31 Dec 2019 08:32  --------------------------------------------------------  IN: 100 mL / OUT: 0 mL / NET: 100 mL          PHYSICAL EXAM:  TELE: NSR  Constitutional: NAD, awake and alert, frail  HEENT: Moist Mucous Membranes, Anicteric  Pulmonary: Non-labored, breath sounds are clear bilaterally, No wheezing, crackles or rhonchi  Cardiovascular: Regular, S1 and S2 nl, No murmurs, rubs, gallops or clicks  Gastrointestinal: Bowel Sounds present, soft, nontender.   Lymph: No lymphadenopathy. No peripheral edema.  Skin: No visible rashes or ulcers.  Psych:  Mood & affect appropriate    LABS: All Labs Reviewed:                        10.6   7.37  )-----------( 193      ( 31 Dec 2019 07:28 )             33.7                         10.5   9.00  )-----------( 209      ( 30 Dec 2019 06:38 )             33.3                         10.0   10.27 )-----------( 189      ( 29 Dec 2019 17:08 )             30.8     31 Dec 2019 07:28    143    |  110    |  34     ----------------------------<  99     3.3     |  24     |  1.20   30 Dec 2019 06:38    143    |  111    |  35     ----------------------------<  107    3.5     |  25     |  1.20   29 Dec 2019 07:03    146    |  111    |  41     ----------------------------<  121    3.9     |  26     |  1.20     Ca    8.6        31 Dec 2019 07:28  Ca    8.7        30 Dec 2019 06:38  Ca    8.8        29 Dec 2019 07:03    TPro  7.1    /  Alb  3.3    /  TBili  1.7    /  DBili  .30    /  AST  16     /  ALT  15     /  AlkPhos  63     30 Dec 2019 06:38  TPro  7.0    /  Alb  3.2    /  TBili  1.1    /  DBili  x      /  AST  18     /  ALT  15     /  AlkPhos  69     29 Dec 2019 07:03    PT/INR - ( 31 Dec 2019 07:28 )   PT: 17.6 sec;   INR: 1.52 ratio         PTT - ( 31 Dec 2019 07:28 )  PTT:29.4 sec         ECG:  < from: 12 Lead ECG (12.27.19 @ 16:04) >    Ventricular Rate 87 BPM    Atrial Rate 87 BPM    P-R Interval 166 ms    QRS Duration 98 ms    Q-T Interval 410 ms    QTC Calculation(Bezet) 493 ms    P Axis 45 degrees    R Axis -34 degrees    T Axis 2 degrees    Diagnosis Line Normal sinus rhythm  Left axis deviation  Minimal voltage criteria for LVH, may be normal variant    < end of copied text >      Echo:    Radiology:            Cardiology

## 2019-12-31 NOTE — PROGRESS NOTE ADULT - PROBLEM SELECTOR PLAN 3
- pt. with continued weakness, secondary to deconditioning  - apprec pt eval recommend bernarda when medically stable

## 2019-12-31 NOTE — PROGRESS NOTE ADULT - SUBJECTIVE AND OBJECTIVE BOX
INTERVAL HPI/OVERNIGHT EVENTS:  pt seen and examined  abdominal pain improved  ate lunch today  ct scan reviewed     MEDICATIONS  (STANDING):  albuterol/ipratropium for Nebulization 3 milliLiter(s) Nebulizer every 8 hours  aspirin enteric coated 81 milliGRAM(s) Oral daily  atorvastatin 40 milliGRAM(s) Oral at bedtime  BACItracin   Ointment 1 Application(s) Topical three times a day  diltiazem    milliGRAM(s) Oral daily  hydrALAZINE 100 milliGRAM(s) Oral every 8 hours  lactated ringers. 1000 milliLiter(s) (50 mL/Hr) IV Continuous <Continuous>  metoprolol succinate  milliGRAM(s) Oral daily  multivitamin 1 Tablet(s) Oral daily  pantoprazole    Tablet 40 milliGRAM(s) Oral two times a day  piperacillin/tazobactam IVPB.. 3.375 Gram(s) IV Intermittent every 8 hours  senna 2 Tablet(s) Oral at bedtime  sodium chloride 0.65% Nasal 1 Spray(s) Both Nostrils four times a day  sucralfate 1 Gram(s) Oral two times a day    MEDICATIONS  (PRN):  aluminum hydroxide/magnesium hydroxide/simethicone Suspension 30 milliLiter(s) Oral every 6 hours PRN Dyspepsia  lidocaine   Patch 1 Patch Transdermal daily PRN back pain  melatonin 5 milliGRAM(s) Oral at bedtime PRN Insomnia  trimethobenzamide 300 milliGRAM(s) Oral three times a day PRN Nausea and/or Vomiting      Allergies    No Known Allergies    Intolerances        Review of Systems:    General:  dec po  Eyes:  Good vision, no reported pain  ENT:  No sore throat, pain, runny nose, dysphagia  CV:  No pain, palpitations, hypo/hypertension  Resp:  No dyspnea, cough, tachypnea, wheezing  GI:  +pain, +nausea, +vomiting, No diarrhea, No constipation, No weight loss, No fever, No pruritis, No rectal bleeding, No melena, No dysphagia  :  No pain, bleeding, incontinence, nocturia  Muscle:  No pain, weakness  Neuro:  No weakness, tingling, memory problems  Psych:  No fatigue, insomnia, mood problems, depression  Endocrine:  No polyuria, polydypsia, cold/heat intolerance  Heme:  No petechiae, ecchymosis, easy bruisability  Skin:  No rash, tattoos, scars, edema      Vital Signs Last 24 Hrs  T(C): 36.7 (30 Dec 2019 07:19), Max: 36.8 (29 Dec 2019 13:00)  T(F): 98 (30 Dec 2019 07:19), Max: 98.2 (29 Dec 2019 13:00)  HR: 74 (30 Dec 2019 07:49) (64 - 87)  BP: 190/90 (30 Dec 2019 08:04) (171/76 - 192/91)  BP(mean): --  RR: 18 (30 Dec 2019 07:19) (18 - 19)  SpO2: 96% (30 Dec 2019 07:49) (94% - 98%)    PHYSICAL EXAM:    General:  lying in bed  HEENT:  NC/AT  Abdomen:  Soft, ttp epigastric region nd  Extremities:  no  edema  Neuro/Psych:  awake alert responds appropriately    LABS:                        10.5   9.00  )-----------( 209      ( 30 Dec 2019 06:38 )             33.3     12-30    143  |  111<H>  |  35<H>  ----------------------------<  107<H>  3.5   |  25  |  1.20    Ca    8.7      30 Dec 2019 06:38    TPro  7.1  /  Alb  3.3  /  TBili  1.7<H>  /  DBili  x   /  AST  16  /  ALT  15  /  AlkPhos  63  12-30    PT/INR - ( 30 Dec 2019 06:38 )   PT: 15.9 sec;   INR: 1.39 ratio               RADIOLOGY & ADDITIONAL TESTS:

## 2019-12-31 NOTE — PROGRESS NOTE ADULT - PROBLEM SELECTOR PLAN 5
- 2/2 to epistaxis  - will continue zosyn for 1 more day as per pulmonolgy recs due to CT finding of acute infection or inflammation versus progression of pulmonary fibrosis  - no signs of active infection  - Pulmonology, Dr. Rene following

## 2019-12-31 NOTE — PROGRESS NOTE ADULT - PROBLEM SELECTOR PLAN 2
- no concern for active bleeding, patient restarted on coumadin  - continue to trend CBC in AM  - Hematology/oncology consulted, Dr. Jaimes - anemia probably multfactorial in etiology.

## 2019-12-31 NOTE — PROGRESS NOTE ADULT - ASSESSMENT
84/F w/ PMHx of afib (coumadin), CVA and hx of aneurysm repair, HTN, HLD, mitral valve prolapse, PVD s/p stents x 3 (RLE), rheumatoid arthritis, cataracts of the L eye BIBEMS to ED for evaluation of epistaxis, which resolved without intervention, admitted for weakness. Now s/p 1 unit prbcs for acute blood loss anemia.

## 2019-12-31 NOTE — PROGRESS NOTE ADULT - ASSESSMENT
84/F w/ PMHx of afib (coumadin), CVA and hx of aneurysm repair, HTN, HLD, mitral valve prolapse, PVD s/p stents x 3 (RLE), rheumatoid arthritis, cataracts of the L eye BIBEMS to ED for evaluation of epistaxis found with elevated bp in setting of back pain     Uncontrolled HTN/Hypertensive Emergency  - likely reactive to pain as patient is in 10/10 back pain awaiting narcotics PO   - continue with Cardizem and Hydralazine increased yesterday   - No evidence of active ischemia or volume overload    Atrial fibrillation  -  currently in NSR  - Dose coumadin for goal INR 2-3 INR 1.52  -monitor closely for bleeding     Hypokalemia  -K 3.3- supplement for K > 4     Hx of CVA/PVD  s/p stent  - Continue aspirin 81 QD  - Continue statin drug    vascular  -Seen by vascular for AAA- for yearly CT    Anemia  -Seen by Hem onc- monitor as per primary team   -Sp 1 unit PRBC no further epistaxis noted     Renita Lira FNP-C  Cardiology NP  [facility] [phone]

## 2019-12-31 NOTE — PROGRESS NOTE ADULT - PROBLEM SELECTOR PLAN 8
Chronic, stable  - Continue metoprolol, diltiazem, and hydralazine 25mg TID with hold parameters  - DASH/TLC diet

## 2019-12-31 NOTE — PROGRESS NOTE ADULT - ASSESSMENT
n/v/epigastric pain  anemia, multifactorial  epistaxis  htn      repeat ekg to eval qtc  gentle ivf; cont ppi bid, carafate bid, maalox prn  cont tigan prn for n/v given qtc; dw pt and nursing  monitor cbc, transfuse prn  monitor exam/gi fxn, prn pain control  recent egd w candida esophagitis/gastritis/bile reflux  bp management as per primary  ct scan reviewed, atherosclerotic disease noted in mesenteric vessel   suspect chronic mesenteric ischemia

## 2019-12-31 NOTE — CONSULT NOTE ADULT - SUBJECTIVE AND OBJECTIVE BOX
History of Present Illness:  84y.o. Female with a history of atrial fibrillation and PAD  was admitted for epistaxis. She developed SOB and had a CT scan of the chest and abdomen to r/o PE.  The scan was negative for PE but she was found to have a incidental 3.6 cm  suprarenal AAA. She denies abdominal, back or flank pain. I was requested to evaluate her AAA.    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  Osteoarthritis  Afib  PVD (peripheral vascular disease)  Neuropathy: (Right lower leg)  H/O cerebral aneurysm repair: brain clips  CVA (cerebral vascular accident): (&quot;Mini-stroke&quot;,1990&#x27;s)  Rheumatoid arthritis  Hyperlipidemia  Hypertension  S/P cataract surgery: (Left eye)  Elective surgery: (&quot;Twisted bowel&quot;, 2014)  Elective surgery: (Exision of cyst on liver, 1985)  S/P ORIF (open reduction internal fixation) fracture: Left hip fx (2012) &amp; R hip fx (2013)  H/O cerebral aneurysm repair: Brain clips (1978(  Renal stone: Cysto stent placement 10/1/2014  PVD (peripheral vascular disease): s/p RLE bypass x 3, most recent 3/2012 Right external iliac to PT bypass w/ PTFE (2012)  S/P FRED (total abdominal hysterectomy): (1987, Hx of &quot;ovarian cancer?&quot;)      Allergies    No Known Allergies    Intolerances        MEDICATIONS  (STANDING):  albuterol/ipratropium for Nebulization 3 milliLiter(s) Nebulizer every 8 hours  aspirin enteric coated 81 milliGRAM(s) Oral daily  atorvastatin 40 milliGRAM(s) Oral at bedtime  BACItracin   Ointment 1 Application(s) Topical three times a day  diltiazem    milliGRAM(s) Oral daily  hydrALAZINE 100 milliGRAM(s) Oral every 8 hours  lactated ringers. 1000 milliLiter(s) (50 mL/Hr) IV Continuous <Continuous>  metoprolol succinate  milliGRAM(s) Oral daily  multivitamin 1 Tablet(s) Oral daily  pantoprazole    Tablet 40 milliGRAM(s) Oral two times a day  piperacillin/tazobactam IVPB.. 3.375 Gram(s) IV Intermittent every 8 hours  senna 2 Tablet(s) Oral at bedtime  sodium chloride 0.65% Nasal 1 Spray(s) Both Nostrils four times a day  sucralfate 1 Gram(s) Oral two times a day    MEDICATIONS  (PRN):  aluminum hydroxide/magnesium hydroxide/simethicone Suspension 30 milliLiter(s) Oral every 6 hours PRN Dyspepsia  lidocaine   Patch 1 Patch Transdermal daily PRN back pain  melatonin 5 milliGRAM(s) Oral at bedtime PRN Insomnia  trimethobenzamide 300 milliGRAM(s) Oral three times a day PRN Nausea and/or Vomiting      Social History:  Smoking History: prior hx.  Alcohol Use: denies    REVIEW OF SYSTEMS:  CONSTITUTIONAL: No weakness, fevers or chills  EYES/ENT: No visual changes;  No vertigo or throat pain   NECK: No pain or stiffness  RESPIRATORY: No cough, wheezing, hemoptysis; ++ occasional shortness of breath  CARDIOVASCULAR: No chest pain or palpitations  GASTROINTESTINAL: No abdominal or epigastric pain. No nausea, vomiting, or hematemesis; No diarrhea or constipation. No melena or hematochezia.  GENITOURINARY: No dysuria, frequency or hematuria  NEUROLOGICAL: No numbness or weakness  SKIN: No itching, burning, rashes, or lesions   Vascular:  No  pedal rest pain or digital ulcers. ++ chronic PAD of right leg  All other review of systems is negative unless indicated above.    PHYSICAL EXAM:  General:  On exam, the patient is alert nontoxic appearing Female in no acute distress.  Vital Signs Last 24 Hrs  T(C): 36.5 (31 Dec 2019 05:08), Max: 37 (30 Dec 2019 19:36)  T(F): 97.7 (31 Dec 2019 05:08), Max: 98.6 (30 Dec 2019 19:36)  HR: 76 (31 Dec 2019 05:08) (61 - 78)  BP: 156/78 (31 Dec 2019 05:08) (122/63 - 192/91)  BP(mean): --  RR: 19 (31 Dec 2019 05:08) (18 - 20)  SpO2: 95% (31 Dec 2019 05:08) (95% - 97%)    Neck:  4+/4+ bilateral carotid pulses; no carotid bruit, no palpable cervical masses.  Heart:  irregular, no murmurs, rubs or gallops.    Lungs:  decreased BS at both bases    Chest:  No chest wall deformities  Symmetrical chest expansion.   Abdomen: Soft and nontender.  No palpable masses.  No rebound, guarding or rigidity.  Extremities: Both  feet are warm, pink with normal capillary refill times.  There are no digital ulcers or heel decubiti.  The calf and thigh muscles are soft and nontender.  There are no palpable cords or limb cellulitis.  Sienna's sign  is negative bilaterally.  There is no lower extremity edema, cyanosis, or rubor.  On examination of the peripheral pulses:  Left leg femoral pulse is  3/4 , popliteal pulse is 3/4   ,PT Pulse is 0  , DP Pulse is 2/4   Right leg femoral pulse is 0  ,popliteal pulse is 0  , PT Pulse is 0 , DP Pulse is 84 y.o. female with multiple comorbidities has chronic PAD of  Neurological:  There are no motor or sensory deficits in either lower extremity.                          10.5   9.00  )-----------( 209      ( 30 Dec 2019 06:38 )             33.3     12-30    143  |  111<H>  |  35<H>  ----------------------------<  107<H>  3.5   |  25  |  1.20    Ca    8.7      30 Dec 2019 06:38    TPro  7.1  /  Alb  3.3  /  TBili  1.7<H>  /  DBili  .30<H>  /  AST  16  /  ALT  15  /  AlkPhos  63  12-30        PT/INR - ( 30 Dec 2019 06:38 )   PT: 15.9 sec;   INR: 1.39 ratio             Radiology:

## 2019-12-31 NOTE — CONSULT NOTE ADULT - ASSESSMENT
84 y.o. female with multiple comorbidities has chronic PAD of her right leg coupled with a small asymptomatic 3.6 cm suprarenal AAA. She has no limb threatening ischemia. No vascular interventions are needed. I would suggest a yearly CT scan of the abdomen to follow the size of her AAA. The patient was given informed consent.

## 2019-12-31 NOTE — PROGRESS NOTE ADULT - SUBJECTIVE AND OBJECTIVE BOX
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  Osteoarthritis  Afib  PVD (peripheral vascular disease)  Neuropathy: (Right lower leg)  Gout  H/O cerebral aneurysm repair: brain clips  CVA (cerebral vascular accident): (&quot;Mini-stroke&quot;,1990&#x27;s)  Rheumatoid arthritis  Asthma  Hyperlipidemia  Hypertension  S/P cataract surgery: (Left eye)  Elective surgery: (&quot;Twisted bowel&quot;, 2014)  Elective surgery: (Exision of cyst on liver, 1985)  S/P ORIF (open reduction internal fixation) fracture: Left hip fx (2012) &amp; R hip fx (2013)  H/O cerebral aneurysm repair: Brain clips (1978(  Renal stone: Cysto stent placement 10/1/2014  PVD (peripheral vascular disease): s/p RLE bypass x 3, most recent 3/2012 Right external iliac to PT bypass w/ PTFE (2012)  S/P FRED (total abdominal hysterectomy): (1987, Hx of &quot;ovarian cancer?&quot;)        Medication list         MEDICATIONS  (STANDING):  albuterol/ipratropium for Nebulization 3 milliLiter(s) Nebulizer every 8 hours  aspirin enteric coated 81 milliGRAM(s) Oral daily  atorvastatin 40 milliGRAM(s) Oral at bedtime  BACItracin   Ointment 1 Application(s) Topical three times a day  diltiazem    milliGRAM(s) Oral daily  hydrALAZINE 100 milliGRAM(s) Oral every 8 hours  lactated ringers. 1000 milliLiter(s) (50 mL/Hr) IV Continuous <Continuous>  metoprolol succinate  milliGRAM(s) Oral daily  multivitamin 1 Tablet(s) Oral daily  oxycodone    5 mG/acetaminophen 325 mG 1 Tablet(s) Oral once  pantoprazole    Tablet 40 milliGRAM(s) Oral two times a day  piperacillin/tazobactam IVPB.. 3.375 Gram(s) IV Intermittent every 8 hours  potassium chloride    Tablet ER 40 milliEquivalent(s) Oral once  senna 2 Tablet(s) Oral at bedtime  sodium chloride 0.65% Nasal 1 Spray(s) Both Nostrils four times a day  sucralfate 1 Gram(s) Oral two times a day    MEDICATIONS  (PRN):  aluminum hydroxide/magnesium hydroxide/simethicone Suspension 30 milliLiter(s) Oral every 6 hours PRN Dyspepsia  lidocaine   Patch 1 Patch Transdermal daily PRN back pain  melatonin 5 milliGRAM(s) Oral at bedtime PRN Insomnia  trimethobenzamide 300 milliGRAM(s) Oral three times a day PRN Nausea and/or Vomiting         Vitals log        ICU Vital Signs Last 24 Hrs  T(C): 36.5 (31 Dec 2019 08:27), Max: 37 (30 Dec 2019 19:36)  T(F): 97.7 (31 Dec 2019 08:27), Max: 98.6 (30 Dec 2019 19:36)  HR: 77 (31 Dec 2019 08:27) (61 - 78)  BP: 172/80 (31 Dec 2019 08:27) (122/63 - 182/85)  BP(mean): --  ABP: --  ABP(mean): --  RR: 18 (31 Dec 2019 08:27) (18 - 20)  SpO2: 96% (31 Dec 2019 08:27) (95% - 97%)           Input and Output:  I&O's Detail    30 Dec 2019 07:01  -  31 Dec 2019 07:00  --------------------------------------------------------  IN:  Total IN: 0 mL    OUT:    Voided: 450 mL  Total OUT: 450 mL    Total NET: -450 mL      31 Dec 2019 07:01  -  31 Dec 2019 08:33  --------------------------------------------------------  IN:    Solution: 100 mL  Total IN: 100 mL    OUT:  Total OUT: 0 mL    Total NET: 100 mL          Lab Data                        10.6   7.37  )-----------( 193      ( 31 Dec 2019 07:28 )             33.7     12-31    143  |  110<H>  |  34<H>  ----------------------------<  99  3.3<L>   |  24  |  1.20    Ca    8.6      31 Dec 2019 07:28    TPro  7.1  /  Alb  3.3  /  TBili  1.7<H>  /  DBili  .30<H>  /  AST  16  /  ALT  15  /  AlkPhos  63  12-30            Review of Systems	      Objective     Physical Examination    head at  heart s1s2  lung dc BS  abd soft      Pertinent Lab findings & Imaging      Fransico:  NO   Adequate UO     I&O's Detail    30 Dec 2019 07:01  -  31 Dec 2019 07:00  --------------------------------------------------------  IN:  Total IN: 0 mL    OUT:    Voided: 450 mL  Total OUT: 450 mL    Total NET: -450 mL      31 Dec 2019 07:01  -  31 Dec 2019 08:33  --------------------------------------------------------  IN:    Solution: 100 mL  Total IN: 100 mL    OUT:  Total OUT: 0 mL    Total NET: 100 mL               Discussed with:     Cultures:	        Radiology

## 2019-12-31 NOTE — PROGRESS NOTE ADULT - SUBJECTIVE AND OBJECTIVE BOX
Patient is a 84y old  Female who presents with a chief complaint of Epistaxis (31 Dec 2019 08:33)    ----  INTERVAL HPI/OVERNIGHT EVENTS: Pt seen and evaluated at the bedside. No acute overnight events occurred. Patient denies any further episodes of epistaxis. Patient states she is having continued pain in her back but it is helped by the pain medication, she contributes the pain to the bed. Denies chest pain, shortness of breath. Admits to some continued diffuse abdominal discomfort.     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  Osteoarthritis  Afib  PVD (peripheral vascular disease)  Neuropathy: (Right lower leg)  H/O cerebral aneurysm repair: brain clips  CVA (cerebral vascular accident): (&quot;Mini-stroke&quot;,1990&#x27;s)  Rheumatoid arthritis  Hyperlipidemia  Hypertension  S/P cataract surgery: (Left eye)  Elective surgery: (&quot;Twisted bowel&quot;, 2014)  Elective surgery: (Exision of cyst on liver, 1985)  S/P ORIF (open reduction internal fixation) fracture: Left hip fx (2012) &amp; R hip fx (2013)  H/O cerebral aneurysm repair: Brain clips (1978(  Renal stone: Cysto stent placement 10/1/2014  PVD (peripheral vascular disease): s/p RLE bypass x 3, most recent 3/2012 Right external iliac to PT bypass w/ PTFE (2012)  S/P FRED (total abdominal hysterectomy): (1987, Hx of &quot;ovarian cancer?&quot;)      FAMILY HISTORY:  No pertinent family history in first degree relatives      Allergies    No Known Allergies    Intolerances        ----  REVIEW OF SYSTEMS:  CONSTITUTIONAL: denies fever, chills, fatigue, weakness  HEENT: denies blurred vision, sore throat  SKIN: denies new lesions, rash  CARDIOVASCULAR: denies chest pain, chest pressure, palpitations  RESPIRATORY: denies shortness of breath, sputum production  GASTROINTESTINAL: denies nausea, vomiting, diarrhea, admits abdominal pain  GENITOURINARY: denies dysuria, discharge  NEUROLOGICAL: denies numbness, headache, focal weakness  MUSCULOSKELETAL: admits back pain, denies new joint pain, muscle aches  HEMATOLOGIC: denies gross bleeding, bruising  LYMPHATICS: denies enlarged lymph nodes, extremity swelling  PSYCHIATRIC: denies recent changes in anxiety, depression  ENDOCRINOLOGIC: denies sweating, cold or heat intolerance    ----  PHYSICAL EXAM:  GENERAL: patient appears well, no acute distress, appropriately interactive  EYES: sclera clear, no exudates  ENMT: oropharynx clear without erythema, moist mucous membranes  NECK: supple, soft, no thyromegaly noted  LUNGS: good air entry bilaterally, clear to auscultation, symmetric breath sounds, no wheezing or rhonchi appreciated  HEART: soft S1/S2, regular rate and rhythm, no murmurs noted, no noted edema to b/l LE  GASTROINTESTINAL: abdomen is soft, epigastric tenderness, nondistended, normoactive bowel sounds, no palpable masses  INTEGUMENT: good skin turgor, appropriate for ethnicity, appears well perfused, no jaundice noted  MUSCULOSKELETAL: no clubbing or cyanosis, no obvious deformity  NEUROLOGIC: awake, alert, oriented x3, good muscle tone in 4 extremities, no obvious sensory deficits  PSYCHIATRIC: mood is good, affect is congruent with mood, linear and logical thought process  HEME/LYMPH: no palpable supraclavicular nodules, no obvious ecchymosis     T(C): 36.3 (12-31-19 @ 11:38), Max: 37 (12-30-19 @ 19:36)  HR: 66 (12-31-19 @ 11:38) (61 - 78)  BP: 95/59 (12-31-19 @ 11:38) (95/59 - 182/76)  RR: 18 (12-31-19 @ 11:38) (18 - 20)  SpO2: 97% (12-31-19 @ 11:38) (95% - 97%)  Wt(kg): --    ----  I&O's Summary    30 Dec 2019 07:01  -  31 Dec 2019 07:00  --------------------------------------------------------  IN: 0 mL / OUT: 450 mL / NET: -450 mL    31 Dec 2019 07:01  -  31 Dec 2019 12:46  --------------------------------------------------------  IN: 100 mL / OUT: 0 mL / NET: 100 mL        LABS:                        10.6   7.37  )-----------( 193      ( 31 Dec 2019 07:28 )             33.7     12-31    143  |  110<H>  |  34<H>  ----------------------------<  99  3.3<L>   |  24  |  1.20    Ca    8.6      31 Dec 2019 07:28    TPro  7.1  /  Alb  3.3  /  TBili  1.7<H>  /  DBili  .30<H>  /  AST  16  /  ALT  15  /  AlkPhos  63  12-30    PT/INR - ( 31 Dec 2019 07:28 )   PT: 17.6 sec;   INR: 1.52 ratio         PTT - ( 31 Dec 2019 07:28 )  PTT:29.4 sec    CAPILLARY BLOOD GLUCOSE                    ---- Patient is a 84y old  Female who presents with a chief complaint of Epistaxis (31 Dec 2019 08:33)    ----  INTERVAL HPI/OVERNIGHT EVENTS: Pt seen and evaluated at the bedside. No acute overnight events occurred. Patient denies any further episodes of epistaxis. Patient states she is having continued pain in her back but it is helped by the pain medication, she contributes the pain to the bed. Denies chest pain, shortness of breath. Admits to some continued diffuse abdominal discomfort.     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  Osteoarthritis  Afib  PVD (peripheral vascular disease)  Neuropathy: (Right lower leg)  H/O cerebral aneurysm repair: brain clips  CVA (cerebral vascular accident): (&quot;Mini-stroke&quot;,1990&#x27;s)  Rheumatoid arthritis  Hyperlipidemia  Hypertension  S/P cataract surgery: (Left eye)  Elective surgery: (&quot;Twisted bowel&quot;, 2014)  Elective surgery: (Exision of cyst on liver, 1985)  S/P ORIF (open reduction internal fixation) fracture: Left hip fx (2012) &amp; R hip fx (2013)  H/O cerebral aneurysm repair: Brain clips (1978(  Renal stone: Cysto stent placement 10/1/2014  PVD (peripheral vascular disease): s/p RLE bypass x 3, most recent 3/2012 Right external iliac to PT bypass w/ PTFE (2012)  S/P FRED (total abdominal hysterectomy): (1987, Hx of &quot;ovarian cancer?&quot;)      FAMILY HISTORY:  No pertinent family history in first degree relatives      Allergies    No Known Allergies    Intolerances        ----  REVIEW OF SYSTEMS:  CONSTITUTIONAL: denies fever, chills, fatigue, weakness  HEENT: denies blurred vision, sore throat  SKIN: denies new lesions, rash  CARDIOVASCULAR: denies chest pain, chest pressure, palpitations  RESPIRATORY: denies shortness of breath, sputum production  GASTROINTESTINAL: denies nausea, vomiting, diarrhea, admits abdominal pain  GENITOURINARY: denies dysuria, discharge  NEUROLOGICAL: denies numbness, headache, focal weakness  MUSCULOSKELETAL: admits back pain, denies new joint pain, muscle aches  HEMATOLOGIC: denies gross bleeding, bruising  LYMPHATICS: denies enlarged lymph nodes, extremity swelling  PSYCHIATRIC: denies recent changes in anxiety, depression  ENDOCRINOLOGIC: denies sweating, cold or heat intolerance    ----  PHYSICAL EXAM:  GENERAL: patient appears well, no acute distress, appropriately interactive  EYES: sclera clear, no exudates  ENMT: oropharynx clear without erythema, moist mucous membranes  NECK: supple, soft, no thyromegaly noted  LUNGS: good air entry bilaterally, clear to auscultation, symmetric breath sounds, no wheezing or rhonchi appreciated  HEART: soft S1/S2, regular rate and rhythm, no murmurs noted, no noted edema to b/l LE  GASTROINTESTINAL: abdomen is soft, epigastric tenderness, nondistended, normoactive bowel sounds, no palpable masses  INTEGUMENT: good skin turgor, appropriate for ethnicity, appears well perfused, no jaundice noted  MUSCULOSKELETAL: no clubbing or cyanosis, no obvious deformity  NEUROLOGIC: awake, alert, oriented x3, good muscle tone in 4 extremities, no obvious sensory deficits  PSYCHIATRIC: mood is good, affect is congruent with mood, linear and logical thought process  HEME/LYMPH: no palpable supraclavicular nodules, no obvious ecchymosis     T(C): 36.3 (12-31-19 @ 11:38), Max: 37 (12-30-19 @ 19:36)  HR: 66 (12-31-19 @ 11:38) (61 - 78)  BP: 95/59 (12-31-19 @ 11:38) (95/59 - 182/76)  RR: 18 (12-31-19 @ 11:38) (18 - 20)  SpO2: 97% (12-31-19 @ 11:38) (95% - 97%)  Wt(kg): --    ----  I&O's Summary    30 Dec 2019 07:01  -  31 Dec 2019 07:00  --------------------------------------------------------  IN: 0 mL / OUT: 450 mL / NET: -450 mL    31 Dec 2019 07:01  -  31 Dec 2019 12:46  --------------------------------------------------------  IN: 100 mL / OUT: 0 mL / NET: 100 mL        LABS:                        10.6   7.37  )-----------( 193      ( 31 Dec 2019 07:28 )             33.7     12-31    143  |  110<H>  |  34<H>  ----------------------------<  99  3.3<L>   |  24  |  1.20    Ca    8.6      31 Dec 2019 07:28    TPro  7.1  /  Alb  3.3  /  TBili  1.7<H>  /  DBili  .30<H>  /  AST  16  /  ALT  15  /  AlkPhos  63  12-30    PT/INR - ( 31 Dec 2019 07:28 )   PT: 17.6 sec;   INR: 1.52 ratio         PTT - ( 31 Dec 2019 07:28 )  PTT:29.4 sec

## 2019-12-31 NOTE — CHART NOTE - NSCHARTNOTEFT_GEN_A_CORE
Assessment: patient seen for malnutrition follow up. patient with fair PO intake food preferences noted.     Factors impacting intake: [ ] none [ ] nausea  [ ] vomiting [ ] diarrhea [ ] constipation  [x ]chewing problems [ ] swallowing issues  [ ] other: dentures per patient    Diet Presciption: Diet, DASH/TLC:   Sodium & Cholesterol Restricted (12-27-19 @ 16:40)    Intake: 30-50% mostly 50%    Current Weight: Weight (kg): 43.6 (12-27 @ 17:12)      Pertinent Medications: MEDICATIONS  (STANDING):  albuterol/ipratropium for Nebulization 3 milliLiter(s) Nebulizer every 8 hours  aspirin enteric coated 81 milliGRAM(s) Oral daily  atorvastatin 40 milliGRAM(s) Oral at bedtime  BACItracin   Ointment 1 Application(s) Topical three times a day  diltiazem    milliGRAM(s) Oral daily  hydrALAZINE 100 milliGRAM(s) Oral every 8 hours  lactated ringers. 1000 milliLiter(s) (50 mL/Hr) IV Continuous <Continuous>  metoprolol succinate  milliGRAM(s) Oral daily  multivitamin 1 Tablet(s) Oral daily  pantoprazole    Tablet 40 milliGRAM(s) Oral two times a day  piperacillin/tazobactam IVPB.. 3.375 Gram(s) IV Intermittent every 8 hours  senna 2 Tablet(s) Oral at bedtime  sodium chloride 0.65% Nasal 1 Spray(s) Both Nostrils four times a day  sucralfate 1 Gram(s) Oral two times a day    MEDICATIONS  (PRN):  aluminum hydroxide/magnesium hydroxide/simethicone Suspension 30 milliLiter(s) Oral every 6 hours PRN Dyspepsia  lidocaine   Patch 1 Patch Transdermal daily PRN back pain  melatonin 5 milliGRAM(s) Oral at bedtime PRN Insomnia  trimethobenzamide 300 milliGRAM(s) Oral three times a day PRN Nausea and/or Vomiting    Pertinent Labs: 12-31 Na143 mmol/L Glu 99 mg/dL K+ 3.3 mmol/L<L> Cr  1.20 mg/dL BUN 34 mg/dL<H> 12-28 Phos 3.0 mg/dL 12-30 Alb 3.3 g        Skin: malathi 15 scab face     Estimated Needs:   [x ] no change since previous assessment  [ ] recalculated:     Previous Nutrition Diagnosis:   [ ] Inadequate Energy Intake [ ]Inadequate Oral Intake [ ] Excessive Energy Intake   [ ] Underweight [ ] Increased Nutrient Needs [ ] Overweight/Obesity   [ ] Altered GI Function [ ] Unintended Weight Loss [ ] Food & Nutrition Related Knowledge Deficit [x ] Malnutrition     Nutrition Diagnosis is [x ] ongoing  [ ] resolved [ ] not applicable     New Nutrition Diagnosis: [x ] not applicable       Interventions:   Recommend  [ ] Change Diet To:  [ ] Nutrition Supplement  [ ] Nutrition Support  [x ] Other: consider low Na diet with ensure enlive TID notified MD about pending order provide food preferences    Monitoring and Evaluation:   [x ] PO intake [ x ] Tolerance to diet prescription [ x ] weights [ x ] labs[ x ] follow up per protocol  [ ] other:

## 2020-01-01 ENCOUNTER — LABORATORY RESULT (OUTPATIENT)
Age: 85
End: 2020-01-01

## 2020-01-01 ENCOUNTER — RX RENEWAL (OUTPATIENT)
Age: 85
End: 2020-01-01

## 2020-01-01 LAB
ANION GAP SERPL CALC-SCNC: 8 MMOL/L — SIGNIFICANT CHANGE UP (ref 5–17)
APTT BLD: 30.7 SEC — SIGNIFICANT CHANGE UP (ref 28.5–37)
BUN SERPL-MCNC: 34 MG/DL — HIGH (ref 7–23)
CALCIUM SERPL-MCNC: 8.4 MG/DL — LOW (ref 8.5–10.1)
CHLORIDE SERPL-SCNC: 110 MMOL/L — HIGH (ref 96–108)
CO2 SERPL-SCNC: 24 MMOL/L — SIGNIFICANT CHANGE UP (ref 22–31)
CREAT SERPL-MCNC: 1.6 MG/DL — HIGH (ref 0.5–1.3)
GLUCOSE SERPL-MCNC: 99 MG/DL — SIGNIFICANT CHANGE UP (ref 70–99)
HCT VFR BLD CALC: 32.1 % — LOW (ref 34.5–45)
HGB BLD-MCNC: 9.9 G/DL — LOW (ref 11.5–15.5)
INR BLD: 1.6 RATIO — HIGH (ref 0.88–1.16)
INR PPP: 1.17 RATIO
INR PPP: 1.28 RATIO
INR PPP: 1.3 RATIO
INR PPP: 1.33 RATIO
INR PPP: 1.45 RATIO
INR PPP: 1.54 RATIO
INR PPP: 1.61 RATIO
INR PPP: 2.07 RATIO
INR PPP: 2.42 RATIO
INR PPP: 2.71 RATIO
INR PPP: 3.55 RATIO
INR PPP: 3.8 RATIO
INR PPP: 5.41 RATIO
INR PPP: 5.85 RATIO
MCHC RBC-ENTMCNC: 27.1 PG — SIGNIFICANT CHANGE UP (ref 27–34)
MCHC RBC-ENTMCNC: 30.8 GM/DL — LOW (ref 32–36)
MCV RBC AUTO: 87.9 FL — SIGNIFICANT CHANGE UP (ref 80–100)
NRBC # BLD: 0 /100 WBCS — SIGNIFICANT CHANGE UP (ref 0–0)
PLATELET # BLD AUTO: 189 K/UL — SIGNIFICANT CHANGE UP (ref 150–400)
POTASSIUM SERPL-MCNC: 3.7 MMOL/L — SIGNIFICANT CHANGE UP (ref 3.5–5.3)
POTASSIUM SERPL-SCNC: 3.7 MMOL/L — SIGNIFICANT CHANGE UP (ref 3.5–5.3)
PROTHROM AB SERPL-ACNC: 18.3 SEC — HIGH (ref 10–12.9)
PT BLD: 13.7 SEC
PT BLD: 15 SEC
PT BLD: 15.2 SEC
PT BLD: 15.5 SEC
PT BLD: 17 SEC
PT BLD: 18 SEC
PT BLD: 18.7 SEC
PT BLD: 23.8 SEC
PT BLD: 27.6 SEC
PT BLD: 30.8 SEC
PT BLD: 39.8 SEC
PT BLD: 42.1 SEC
PT BLD: 58.9 SEC
PT BLD: 63.5 SEC
RBC # BLD: 3.65 M/UL — LOW (ref 3.8–5.2)
RBC # FLD: 16.3 % — HIGH (ref 10.3–14.5)
SODIUM SERPL-SCNC: 142 MMOL/L — SIGNIFICANT CHANGE UP (ref 135–145)
WBC # BLD: 7.9 K/UL — SIGNIFICANT CHANGE UP (ref 3.8–10.5)
WBC # FLD AUTO: 7.9 K/UL — SIGNIFICANT CHANGE UP (ref 3.8–10.5)

## 2020-01-01 PROCEDURE — 99233 SBSQ HOSP IP/OBS HIGH 50: CPT

## 2020-01-01 PROCEDURE — 99232 SBSQ HOSP IP/OBS MODERATE 35: CPT

## 2020-01-01 RX ORDER — SODIUM,POTASSIUM PHOSPHATES 278-250MG
1 POWDER IN PACKET (EA) ORAL ONCE
Refills: 0 | Status: COMPLETED | OUTPATIENT
Start: 2020-01-01 | End: 2020-01-01

## 2020-01-01 RX ORDER — PIPERACILLIN AND TAZOBACTAM 4; .5 G/20ML; G/20ML
3.38 INJECTION, POWDER, LYOPHILIZED, FOR SOLUTION INTRAVENOUS EVERY 8 HOURS
Refills: 0 | Status: DISCONTINUED | OUTPATIENT
Start: 2020-01-01 | End: 2020-01-02

## 2020-01-01 RX ORDER — ACETAMINOPHEN 500 MG
650 TABLET ORAL EVERY 6 HOURS
Refills: 0 | Status: DISCONTINUED | OUTPATIENT
Start: 2020-01-01 | End: 2020-01-04

## 2020-01-01 RX ORDER — GABAPENTIN 100 MG/1
100 CAPSULE ORAL 3 TIMES DAILY
Qty: 90 | Refills: 3 | Status: ACTIVE | COMMUNITY
Start: 2017-09-07

## 2020-01-01 RX ORDER — WARFARIN 2.5 MG/1
2.5 TABLET ORAL DAILY
Qty: 90 | Refills: 2 | Status: ACTIVE | COMMUNITY
Start: 2019-03-26

## 2020-01-01 RX ORDER — OXYCODONE AND ACETAMINOPHEN 5; 325 MG/1; MG/1
1 TABLET ORAL EVERY 8 HOURS
Refills: 0 | Status: DISCONTINUED | OUTPATIENT
Start: 2020-01-01 | End: 2020-01-04

## 2020-01-01 RX ORDER — WARFARIN SODIUM 2.5 MG/1
5 TABLET ORAL ONCE
Refills: 0 | Status: COMPLETED | OUTPATIENT
Start: 2020-01-01 | End: 2020-01-01

## 2020-01-01 RX ADMIN — Medication 650 MILLIGRAM(S): at 09:01

## 2020-01-01 RX ADMIN — PIPERACILLIN AND TAZOBACTAM 25 GRAM(S): 4; .5 INJECTION, POWDER, LYOPHILIZED, FOR SOLUTION INTRAVENOUS at 15:30

## 2020-01-01 RX ADMIN — Medication 1 SPRAY(S): at 17:19

## 2020-01-01 RX ADMIN — PANTOPRAZOLE SODIUM 40 MILLIGRAM(S): 20 TABLET, DELAYED RELEASE ORAL at 05:50

## 2020-01-01 RX ADMIN — Medication 1 SPRAY(S): at 11:35

## 2020-01-01 RX ADMIN — OXYCODONE AND ACETAMINOPHEN 1 TABLET(S): 5; 325 TABLET ORAL at 15:30

## 2020-01-01 RX ADMIN — Medication 1 SPRAY(S): at 21:38

## 2020-01-01 RX ADMIN — Medication 1 SPRAY(S): at 00:28

## 2020-01-01 RX ADMIN — Medication 100 MILLIGRAM(S): at 05:50

## 2020-01-01 RX ADMIN — OXYCODONE AND ACETAMINOPHEN 1 TABLET(S): 5; 325 TABLET ORAL at 16:00

## 2020-01-01 RX ADMIN — Medication 81 MILLIGRAM(S): at 11:35

## 2020-01-01 RX ADMIN — Medication 150 MILLIGRAM(S): at 05:50

## 2020-01-01 RX ADMIN — Medication 1 GRAM(S): at 05:52

## 2020-01-01 RX ADMIN — PANTOPRAZOLE SODIUM 40 MILLIGRAM(S): 20 TABLET, DELAYED RELEASE ORAL at 17:19

## 2020-01-01 RX ADMIN — Medication 1 APPLICATION(S): at 05:51

## 2020-01-01 RX ADMIN — Medication 1 APPLICATION(S): at 13:42

## 2020-01-01 RX ADMIN — SENNA PLUS 2 TABLET(S): 8.6 TABLET ORAL at 21:38

## 2020-01-01 RX ADMIN — Medication 3 MILLILITER(S): at 22:52

## 2020-01-01 RX ADMIN — PIPERACILLIN AND TAZOBACTAM 25 GRAM(S): 4; .5 INJECTION, POWDER, LYOPHILIZED, FOR SOLUTION INTRAVENOUS at 21:38

## 2020-01-01 RX ADMIN — Medication 100 MILLIGRAM(S): at 21:38

## 2020-01-01 RX ADMIN — Medication 1 TABLET(S): at 17:19

## 2020-01-01 RX ADMIN — Medication 1 GRAM(S): at 17:19

## 2020-01-01 RX ADMIN — Medication 1 APPLICATION(S): at 21:39

## 2020-01-01 RX ADMIN — Medication 3 MILLILITER(S): at 15:21

## 2020-01-01 RX ADMIN — WARFARIN SODIUM 5 MILLIGRAM(S): 2.5 TABLET ORAL at 21:38

## 2020-01-01 RX ADMIN — Medication 240 MILLIGRAM(S): at 05:50

## 2020-01-01 RX ADMIN — Medication 1 TABLET(S): at 11:35

## 2020-01-01 RX ADMIN — Medication 100 MILLIGRAM(S): at 13:42

## 2020-01-01 RX ADMIN — PIPERACILLIN AND TAZOBACTAM 25 GRAM(S): 4; .5 INJECTION, POWDER, LYOPHILIZED, FOR SOLUTION INTRAVENOUS at 08:28

## 2020-01-01 RX ADMIN — Medication 3 MILLILITER(S): at 08:01

## 2020-01-01 RX ADMIN — ATORVASTATIN CALCIUM 40 MILLIGRAM(S): 80 TABLET, FILM COATED ORAL at 21:38

## 2020-01-01 RX ADMIN — Medication 1 SPRAY(S): at 05:52

## 2020-01-01 RX ADMIN — Medication 650 MILLIGRAM(S): at 09:31

## 2020-01-01 RX ADMIN — PIPERACILLIN AND TAZOBACTAM 25 GRAM(S): 4; .5 INJECTION, POWDER, LYOPHILIZED, FOR SOLUTION INTRAVENOUS at 00:28

## 2020-01-01 NOTE — PROGRESS NOTE ADULT - SUBJECTIVE AND OBJECTIVE BOX
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  Osteoarthritis  Afib  PVD (peripheral vascular disease)  Neuropathy: (Right lower leg)  Gout  H/O cerebral aneurysm repair: brain clips  CVA (cerebral vascular accident): (&quot;Mini-stroke&quot;,1990&#x27;s)  Rheumatoid arthritis  Asthma  Hyperlipidemia  Hypertension  S/P cataract surgery: (Left eye)  Elective surgery: (&quot;Twisted bowel&quot;, 2014)  Elective surgery: (Exision of cyst on liver, 1985)  S/P ORIF (open reduction internal fixation) fracture: Left hip fx (2012) &amp; R hip fx (2013)  H/O cerebral aneurysm repair: Brain clips (1978(  Renal stone: Cysto stent placement 10/1/2014  PVD (peripheral vascular disease): s/p RLE bypass x 3, most recent 3/2012 Right external iliac to PT bypass w/ PTFE (2012)  S/P FRED (total abdominal hysterectomy): (1987, Hx of &quot;ovarian cancer?&quot;)        Medication list         MEDICATIONS  (STANDING):  albuterol/ipratropium for Nebulization 3 milliLiter(s) Nebulizer every 8 hours  aspirin enteric coated 81 milliGRAM(s) Oral daily  atorvastatin 40 milliGRAM(s) Oral at bedtime  BACItracin   Ointment 1 Application(s) Topical three times a day  diltiazem    milliGRAM(s) Oral daily  hydrALAZINE 100 milliGRAM(s) Oral every 8 hours  lactated ringers. 1000 milliLiter(s) (50 mL/Hr) IV Continuous <Continuous>  metoprolol succinate  milliGRAM(s) Oral daily  multivitamin 1 Tablet(s) Oral daily  pantoprazole    Tablet 40 milliGRAM(s) Oral two times a day  piperacillin/tazobactam IVPB.. 3.375 Gram(s) IV Intermittent every 8 hours  senna 2 Tablet(s) Oral at bedtime  sodium chloride 0.65% Nasal 1 Spray(s) Both Nostrils four times a day  sucralfate 1 Gram(s) Oral two times a day    MEDICATIONS  (PRN):  aluminum hydroxide/magnesium hydroxide/simethicone Suspension 30 milliLiter(s) Oral every 6 hours PRN Dyspepsia  lidocaine   Patch 1 Patch Transdermal daily PRN back pain  melatonin 5 milliGRAM(s) Oral at bedtime PRN Insomnia  trimethobenzamide 300 milliGRAM(s) Oral three times a day PRN Nausea and/or Vomiting         Vitals log        ICU Vital Signs Last 24 Hrs  T(C): 36.5 (01 Jan 2020 04:44), Max: 36.9 (31 Dec 2019 19:46)  T(F): 97.7 (01 Jan 2020 04:44), Max: 98.5 (31 Dec 2019 19:46)  HR: 74 (01 Jan 2020 04:44) (66 - 82)  BP: 137/74 (01 Jan 2020 04:44) (95/59 - 172/80)  BP(mean): --  ABP: --  ABP(mean): --  RR: 18 (01 Jan 2020 04:44) (18 - 18)  SpO2: 97% (01 Jan 2020 04:44) (94% - 97%)           Input and Output:  I&O's Detail    31 Dec 2019 07:01  -  01 Jan 2020 07:00  --------------------------------------------------------  IN:    Oral Fluid: 650 mL    Solution: 200 mL  Total IN: 850 mL    OUT:  Total OUT: 0 mL    Total NET: 850 mL          Lab Data                        9.9    7.90  )-----------( 189      ( 01 Jan 2020 06:56 )             32.1     01-01    142  |  110<H>  |  34<H>  ----------------------------<  99  3.7   |  24  |  1.60<H>    Ca    8.4<L>      01 Jan 2020 06:56              Review of Systems	      Objective     Physical Examination    head at  heart s1s2  lung dec BS  on room air  abd soft      Pertinent Lab findings & Imaging      Fransico:  NO   Adequate UO     I&O's Detail    31 Dec 2019 07:01  -  01 Jan 2020 07:00  --------------------------------------------------------  IN:    Oral Fluid: 650 mL    Solution: 200 mL  Total IN: 850 mL    OUT:  Total OUT: 0 mL    Total NET: 850 mL               Discussed with:     Cultures:	        Radiology

## 2020-01-01 NOTE — PROGRESS NOTE ADULT - PROBLEM SELECTOR PLAN 4
- will continue coumadin as no active bleed at this time and risks vs benefits for afib, daughter and patient agree  - will dose 5mg tonight, as well, goal INR of 2-3 and has been slowly rising (at home pt takes only 2.5mg six days per weeks and 5mg once per week, but did come in subtherapeutic.)  -decision had been made between pt and physicians earlier in admission to not bridge in order to decrease chance of recurrence of bleeding - pt with continued weakness, secondary to deconditioning  - apprec PT eval, recommend [facility] when medically stable  - expect d/c to [facility] in next 1-2 days  - f/up with SW

## 2020-01-01 NOTE — PROGRESS NOTE ADULT - PROBLEM SELECTOR PLAN 3
- pt with continued weakness, secondary to deconditioning  - apprec PT eval, recommend [facility] when medically stable  - expect d/c to [facility] in next 1-2 days  - f/up with SW -pt with mild BRIDGET  -encouraged po hydration   -monitor BMP, if has further worsening consider abdominal imaging

## 2020-01-01 NOTE — PROGRESS NOTE ADULT - ASSESSMENT
83yo F w/ PMHx of afib (coumadin), CVA and hx of aneurysm repair, HTN, HLD, mitral valve prolapse, PVD s/p stents x 3 (RLE), rheumatoid arthritis, cataracts of the L eye p/w epistaxis, which resolved without intervention, admitted with weakness and acute blood loss anemia s/p 1 unit PRBC, and suspected aspiration PNA.

## 2020-01-01 NOTE — PROGRESS NOTE ADULT - ASSESSMENT
n/v/epigastric pain  anemia, multifactorial  epistaxis, resolved      clinically improved  ct scan reviewed, atherosclerotic disease noted in mesenteric vessel  suspect chronic mesenteric ischemia   cont ppi bid, carafate bid, maalox prn, tigan prn  monitor cbc, transfuse prn  monitor exam/gi fxn  diet as tolerated  recent egd w candida esophagitis/gastritis/bile reflux  will follow

## 2020-01-01 NOTE — PROGRESS NOTE ADULT - SUBJECTIVE AND OBJECTIVE BOX
INTERVAL HPI/OVERNIGHT EVENTS:  pt seen and examined  denies n/v/abd pain  ate steak last  night for dinner  per rn no acute gi issues overnight    MEDICATIONS  (STANDING):  albuterol/ipratropium for Nebulization 3 milliLiter(s) Nebulizer every 8 hours  aspirin enteric coated 81 milliGRAM(s) Oral daily  atorvastatin 40 milliGRAM(s) Oral at bedtime  BACItracin   Ointment 1 Application(s) Topical three times a day  diltiazem    milliGRAM(s) Oral daily  hydrALAZINE 100 milliGRAM(s) Oral every 8 hours  lactated ringers. 1000 milliLiter(s) (50 mL/Hr) IV Continuous <Continuous>  metoprolol succinate  milliGRAM(s) Oral daily  multivitamin 1 Tablet(s) Oral daily  pantoprazole    Tablet 40 milliGRAM(s) Oral two times a day  senna 2 Tablet(s) Oral at bedtime  sodium chloride 0.65% Nasal 1 Spray(s) Both Nostrils four times a day  sucralfate 1 Gram(s) Oral two times a day    MEDICATIONS  (PRN):  aluminum hydroxide/magnesium hydroxide/simethicone Suspension 30 milliLiter(s) Oral every 6 hours PRN Dyspepsia  lidocaine   Patch 1 Patch Transdermal daily PRN back pain  melatonin 5 milliGRAM(s) Oral at bedtime PRN Insomnia  trimethobenzamide 300 milliGRAM(s) Oral three times a day PRN Nausea and/or Vomiting      Allergies    No Known Allergies    Intolerances        Review of Systems:    General:  No wt loss, fevers, chills, night sweats, fatigue   Eyes:  Good vision, no reported pain  ENT:  No sore throat, pain, runny nose, dysphagia  CV:  No pain, palpitations, hypo/hypertension  Resp:  No dyspnea, cough, tachypnea, wheezing  GI:  No pain, No nausea, No vomiting, No diarrhea, No constipation, No weight loss, No fever, No pruritis, No rectal bleeding, No melena, No dysphagia  :  No pain, bleeding, incontinence, nocturia  Muscle:  No pain, weakness  Neuro:  No weakness, tingling, memory problems  Psych:  No fatigue, insomnia, mood problems, depression  Endocrine:  No polyuria, polydypsia, cold/heat intolerance  Heme:  No petechiae, ecchymosis, easy bruisability  Skin:  No rash, tattoos, scars, edema      Vital Signs Last 24 Hrs  T(C): 36.4 (01 Jan 2020 07:59), Max: 36.9 (31 Dec 2019 19:46)  T(F): 97.6 (01 Jan 2020 07:59), Max: 98.5 (31 Dec 2019 19:46)  HR: 67 (01 Jan 2020 07:59) (66 - 82)  BP: 146/71 (01 Jan 2020 07:59) (95/59 - 150/66)  BP(mean): --  RR: 18 (01 Jan 2020 07:59) (18 - 18)  SpO2: 97% (01 Jan 2020 07:59) (94% - 97%)    PHYSICAL EXAM:    General:  lying in bed  HEENT:  NC/AT  Abdomen:  Soft, nt nd  Extremities:  no  edema  Neuro/Psych:  awake alert responds appropriately      LABS:                        9.9    7.90  )-----------( 189      ( 01 Jan 2020 06:56 )             32.1     01-01    142  |  110<H>  |  34<H>  ----------------------------<  99  3.7   |  24  |  1.60<H>    Ca    8.4<L>      01 Jan 2020 06:56      PT/INR - ( 01 Jan 2020 06:56 )   PT: 18.3 sec;   INR: 1.60 ratio         PTT - ( 01 Jan 2020 06:56 )  PTT:30.7 sec      RADIOLOGY & ADDITIONAL TESTS:

## 2020-01-01 NOTE — PROGRESS NOTE ADULT - SUBJECTIVE AND OBJECTIVE BOX
Patient is a 84y old  Female who presents with a chief complaint of Epistaxis.      INTERVAL HPI/OVERNIGHT EVENTS: Pt states she has chronic pain in her hands and lower back due to RA and that her hands especially are very painful now. Pt wishes to take percocet, as she has at home (pt states she had good relieve with one-time doses she was given past couple of days, which notably was the 5mg formulation while her home dose is the 7.5mg). Pt states she uses only 2 tabs per day at home even though her prescription (confirmed with [facility]) is q6h PRN.    MEDICATIONS  (STANDING):  albuterol/ipratropium for Nebulization 3 milliLiter(s) Nebulizer every 8 hours  aspirin enteric coated 81 milliGRAM(s) Oral daily  atorvastatin 40 milliGRAM(s) Oral at bedtime  BACItracin   Ointment 1 Application(s) Topical three times a day  diltiazem    milliGRAM(s) Oral daily  hydrALAZINE 100 milliGRAM(s) Oral every 8 hours  metoprolol succinate  milliGRAM(s) Oral daily  multivitamin 1 Tablet(s) Oral daily  pantoprazole    Tablet 40 milliGRAM(s) Oral two times a day  senna 2 Tablet(s) Oral at bedtime  sodium chloride 0.65% Nasal 1 Spray(s) Both Nostrils four times a day  sucralfate 1 Gram(s) Oral two times a day    MEDICATIONS  (PRN):  acetaminophen   Tablet .. 650 milliGRAM(s) Oral every 6 hours PRN Mild Pain (1 - 3)  aluminum hydroxide/magnesium hydroxide/simethicone Suspension 30 milliLiter(s) Oral every 6 hours PRN Dyspepsia  lidocaine   Patch 1 Patch Transdermal daily PRN back pain  melatonin 5 milliGRAM(s) Oral at bedtime PRN Insomnia  trimethobenzamide 300 milliGRAM(s) Oral three times a day PRN Nausea and/or Vomiting      Allergies    No Known Allergies    Intolerances        REVIEW OF SYSTEMS:  CONSTITUTIONAL: No fever or chills  HEENT:  no epistaxis, no headache, no sore throat  RESPIRATORY: denies cough, wheezing, or shortness of breath  CARDIOVASCULAR: No chest pain, palpitations  GASTROINTESTINAL: No abd pain, nausea, vomiting, or diarrhea  GENITOURINARY: No dysuria, frequency, or hematuria  NEUROLOGICAL: no focal weakness or dizziness  MUSCULOSKELETAL: +chronic pain in hands and lower back     Vital Signs Last 24 Hrs  T(C): 36.4 (01 Jan 2020 07:59), Max: 36.9 (31 Dec 2019 19:46)  T(F): 97.6 (01 Jan 2020 07:59), Max: 98.5 (31 Dec 2019 19:46)  HR: 66 (01 Jan 2020 08:00) (66 - 82)  BP: 146/71 (01 Jan 2020 07:59) (95/59 - 150/66)  BP(mean): --  RR: 18 (01 Jan 2020 07:59) (18 - 18)  SpO2: 94% (01 Jan 2020 08:00) (94% - 97%)    PHYSICAL EXAM:  GENERAL: NAD  HEENT:  anicteric, moist mucous membranes  CHEST/LUNG:  somewhat diminished in bases, rest CTA b/l, no rales, wheezes, or rhonchi  HEART:  RRR, S1, S2  ABDOMEN:  BS+, soft, nontender, nondistended  EXTREMITIES: severe RA deformities in the hands; no edema, cyanosis, or calf tenderness  NERVOUS SYSTEM: answers questions and follows commands appropriately    LABS:                        9.9    7.90  )-----------( 189      ( 01 Jan 2020 06:56 )             32.1     CBC Full  -  ( 01 Jan 2020 06:56 )  WBC Count : 7.90 K/uL  Hemoglobin : 9.9 g/dL  Hematocrit : 32.1 %  Platelet Count - Automated : 189 K/uL  Mean Cell Volume : 87.9 fl  Mean Cell Hemoglobin : 27.1 pg  Mean Cell Hemoglobin Concentration : 30.8 gm/dL  Auto Neutrophil # : x  Auto Lymphocyte # : x  Auto Monocyte # : x  Auto Eosinophil # : x  Auto Basophil # : x  Auto Neutrophil % : x  Auto Lymphocyte % : x  Auto Monocyte % : x  Auto Eosinophil % : x  Auto Basophil % : x    01 Jan 2020 06:56    142    |  110    |  34     ----------------------------<  99     3.7     |  24     |  1.60     Ca    8.4        01 Jan 2020 06:56  Phos  2.4       01 Jan 2020 06:56  Mg     2.1       01 Jan 2020 06:56      PT/INR - ( 01 Jan 2020 06:56 )   PT: 18.3 sec;   INR: 1.60 ratio         PTT - ( 01 Jan 2020 06:56 )  PTT:30.7 sec    CAPILLARY BLOOD GLUCOSE              RADIOLOGY & ADDITIONAL TESTS:    Personally reviewed.     Consultant(s) Notes Reviewed:  [x] YES  [ ] NO Patient is a 84y old  Female who presents with a chief complaint of Epistaxis.    INTERVAL HPI/OVERNIGHT EVENTS: Pt states she has chronic pain in her hands and lower back due to RA and that her hands especially are very painful now. Pt wishes to take percocet, as she has at home (pt states she had good relieve with one-time doses she was given past couple of days, which notably was the 5mg formulation while her home dose is the 7.5mg). Pt states she uses only 2 tabs per day at home even though her prescription (confirmed with [facility]) is q6h PRN.    MEDICATIONS  (STANDING):  albuterol/ipratropium for Nebulization 3 milliLiter(s) Nebulizer every 8 hours  aspirin enteric coated 81 milliGRAM(s) Oral daily  atorvastatin 40 milliGRAM(s) Oral at bedtime  BACItracin   Ointment 1 Application(s) Topical three times a day  diltiazem    milliGRAM(s) Oral daily  hydrALAZINE 100 milliGRAM(s) Oral every 8 hours  metoprolol succinate  milliGRAM(s) Oral daily  multivitamin 1 Tablet(s) Oral daily  pantoprazole    Tablet 40 milliGRAM(s) Oral two times a day  senna 2 Tablet(s) Oral at bedtime  sodium chloride 0.65% Nasal 1 Spray(s) Both Nostrils four times a day  sucralfate 1 Gram(s) Oral two times a day    MEDICATIONS  (PRN):  acetaminophen   Tablet .. 650 milliGRAM(s) Oral every 6 hours PRN Mild Pain (1 - 3)  aluminum hydroxide/magnesium hydroxide/simethicone Suspension 30 milliLiter(s) Oral every 6 hours PRN Dyspepsia  lidocaine   Patch 1 Patch Transdermal daily PRN back pain  melatonin 5 milliGRAM(s) Oral at bedtime PRN Insomnia  trimethobenzamide 300 milliGRAM(s) Oral three times a day PRN Nausea and/or Vomiting      Allergies    No Known Allergies    Intolerances        REVIEW OF SYSTEMS:  CONSTITUTIONAL: No fever or chills  HEENT:  no epistaxis, no headache, no sore throat  RESPIRATORY: denies cough, wheezing, or shortness of breath  CARDIOVASCULAR: No chest pain, palpitations  GASTROINTESTINAL: No abd pain, nausea, vomiting, or diarrhea  GENITOURINARY: No dysuria, frequency, or hematuria  NEUROLOGICAL: no focal weakness or dizziness  MUSCULOSKELETAL: +chronic pain in hands and lower back     Vital Signs Last 24 Hrs  T(C): 36.4 (01 Jan 2020 07:59), Max: 36.9 (31 Dec 2019 19:46)  T(F): 97.6 (01 Jan 2020 07:59), Max: 98.5 (31 Dec 2019 19:46)  HR: 66 (01 Jan 2020 08:00) (66 - 82)  BP: 146/71 (01 Jan 2020 07:59) (95/59 - 150/66)  BP(mean): --  RR: 18 (01 Jan 2020 07:59) (18 - 18)  SpO2: 94% (01 Jan 2020 08:00) (94% - 97%)    PHYSICAL EXAM:  GENERAL: NAD  HEENT:  anicteric, moist mucous membranes  CHEST/LUNG:  somewhat diminished in bases, rest CTA b/l, no rales, wheezes, or rhonchi  HEART:  RRR, S1, S2  ABDOMEN:  BS+, soft, nontender, nondistended  EXTREMITIES: severe RA deformities in the hands; no edema, cyanosis, or calf tenderness  NERVOUS SYSTEM: answers questions and follows commands appropriately    LABS:                        9.9    7.90  )-----------( 189      ( 01 Jan 2020 06:56 )             32.1     CBC Full  -  ( 01 Jan 2020 06:56 )  WBC Count : 7.90 K/uL  Hemoglobin : 9.9 g/dL  Hematocrit : 32.1 %  Platelet Count - Automated : 189 K/uL  Mean Cell Volume : 87.9 fl  Mean Cell Hemoglobin : 27.1 pg  Mean Cell Hemoglobin Concentration : 30.8 gm/dL  Auto Neutrophil # : x  Auto Lymphocyte # : x  Auto Monocyte # : x  Auto Eosinophil # : x  Auto Basophil # : x  Auto Neutrophil % : x  Auto Lymphocyte % : x  Auto Monocyte % : x  Auto Eosinophil % : x  Auto Basophil % : x    01 Jan 2020 06:56    142    |  110    |  34     ----------------------------<  99     3.7     |  24     |  1.60     Ca    8.4        01 Jan 2020 06:56  Phos  2.4       01 Jan 2020 06:56  Mg     2.1       01 Jan 2020 06:56      PT/INR - ( 01 Jan 2020 06:56 )   PT: 18.3 sec;   INR: 1.60 ratio         PTT - ( 01 Jan 2020 06:56 )  PTT:30.7 sec    CAPILLARY BLOOD GLUCOSE              RADIOLOGY & ADDITIONAL TESTS:    Personally reviewed.     Consultant(s) Notes Reviewed:  [x] YES  [ ] NO

## 2020-01-01 NOTE — PROGRESS NOTE ADULT - SUBJECTIVE AND OBJECTIVE BOX
[facility] -- Dahiana Bañuelos, Toney Horner, Truman Falcon Savella  Office # [phone]      Follow Up:    HTN  Subjective/Observations:   No events overnight resting comfortably in bed.  No complaints of chest pain, dyspnea, or palpitations reported. No signs of orthopnea or PND.     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  Osteoarthritis  Afib  PVD (peripheral vascular disease)  Neuropathy: (Right lower leg)  H/O cerebral aneurysm repair: brain clips  CVA (cerebral vascular accident): (&quot;Mini-stroke&quot;,1990&#x27;s)  Rheumatoid arthritis  Hyperlipidemia  Hypertension  S/P cataract surgery: (Left eye)  Elective surgery: (&quot;Twisted bowel&quot;, 2014)  Elective surgery: (Exision of cyst on liver, 1985)  S/P ORIF (open reduction internal fixation) fracture: Left hip fx (2012) &amp; R hip fx (2013)  H/O cerebral aneurysm repair: Brain clips (1978(  Renal stone: Cysto stent placement 10/1/2014  PVD (peripheral vascular disease): s/p RLE bypass x 3, most recent 3/2012 Right external iliac to PT bypass w/ PTFE (2012)  S/P FRED (total abdominal hysterectomy): (1987, Hx of &quot;ovarian cancer?&quot;)      MEDICATIONS  (STANDING):  albuterol/ipratropium for Nebulization 3 milliLiter(s) Nebulizer every 8 hours  aspirin enteric coated 81 milliGRAM(s) Oral daily  atorvastatin 40 milliGRAM(s) Oral at bedtime  BACItracin   Ointment 1 Application(s) Topical three times a day  diltiazem    milliGRAM(s) Oral daily  hydrALAZINE 100 milliGRAM(s) Oral every 8 hours  lactated ringers. 1000 milliLiter(s) (50 mL/Hr) IV Continuous <Continuous>  metoprolol succinate  milliGRAM(s) Oral daily  multivitamin 1 Tablet(s) Oral daily  pantoprazole    Tablet 40 milliGRAM(s) Oral two times a day  senna 2 Tablet(s) Oral at bedtime  sodium chloride 0.65% Nasal 1 Spray(s) Both Nostrils four times a day  sucralfate 1 Gram(s) Oral two times a day    MEDICATIONS  (PRN):  acetaminophen   Tablet .. 650 milliGRAM(s) Oral every 6 hours PRN Mild Pain (1 - 3)  aluminum hydroxide/magnesium hydroxide/simethicone Suspension 30 milliLiter(s) Oral every 6 hours PRN Dyspepsia  lidocaine   Patch 1 Patch Transdermal daily PRN back pain  melatonin 5 milliGRAM(s) Oral at bedtime PRN Insomnia  trimethobenzamide 300 milliGRAM(s) Oral three times a day PRN Nausea and/or Vomiting      Allergies    No Known Allergies    Intolerances        Vital Signs Last 24 Hrs  T(C): 36.4 (01 Jan 2020 07:59), Max: 36.9 (31 Dec 2019 19:46)  T(F): 97.6 (01 Jan 2020 07:59), Max: 98.5 (31 Dec 2019 19:46)  HR: 66 (01 Jan 2020 08:00) (66 - 82)  BP: 146/71 (01 Jan 2020 07:59) (95/59 - 150/66)  BP(mean): --  RR: 18 (01 Jan 2020 07:59) (18 - 18)  SpO2: 94% (01 Jan 2020 08:00) (94% - 97%)    I&O's Summary    31 Dec 2019 07:01  -  01 Jan 2020 07:00  --------------------------------------------------------  IN: 850 mL / OUT: 0 mL / NET: 850 mL          PHYSICAL EXAM:  TELE: Off tele   Constitutional: NAD, awake and alert, well-developed  HEENT: Moist Mucous Membranes, Anicteric  Pulmonary: Non-labored, breath sounds are clear bilaterally, No wheezing, crackles or rhonchi  Cardiovascular: Regular, S1 and S2 nl, No murmurs, rubs, gallops or clicks  Gastrointestinal: Bowel Sounds present, soft, nontender.   Lymph: No lymphadenopathy. No peripheral edema.  Skin: No visible rashes or ulcers.  Psych:  Mood & affect appropriate    LABS: All Labs Reviewed:                        9.9    7.90  )-----------( 189      ( 01 Jan 2020 06:56 )             32.1                         10.6   7.37  )-----------( 193      ( 31 Dec 2019 07:28 )             33.7                         10.5   9.00  )-----------( 209      ( 30 Dec 2019 06:38 )             33.3     01 Jan 2020 06:56    142    |  110    |  34     ----------------------------<  99     3.7     |  24     |  1.60   31 Dec 2019 07:28    143    |  110    |  34     ----------------------------<  99     3.3     |  24     |  1.20   30 Dec 2019 06:38    143    |  111    |  35     ----------------------------<  107    3.5     |  25     |  1.20     Ca    8.4        01 Jan 2020 06:56  Ca    8.6        31 Dec 2019 07:28  Ca    8.7        30 Dec 2019 06:38    TPro  7.1    /  Alb  3.3    /  TBili  1.7    /  DBili  .30    /  AST  16     /  ALT  15     /  AlkPhos  63     30 Dec 2019 06:38    PT/INR - ( 01 Jan 2020 06:56 )   PT: 18.3 sec;   INR: 1.60 ratio         PTT - ( 01 Jan 2020 06:56 )  PTT:30.7 sec         ECG:  < from: 12 Lead ECG (12.27.19 @ 16:04) >    Ventricular Rate 87 BPM    Atrial Rate 87 BPM    P-R Interval 166 ms    QRS Duration 98 ms    Q-T Interval 410 ms    QTC Calculation(Bezet) 493 ms    P Axis 45 degrees    R Axis -34 degrees    T Axis 2 degrees    Diagnosis Line Normal sinus rhythm  Left axis deviation  Minimal voltage criteria for LVH, may be normal variant    < end of copied text >        < from: TTE Echo Doppler w/o Cont (06.10.19 @ 14:06) >  EXAM:  ECHO TTE WO CON COMP W DOPPLR         PROCEDURE DATE:  06/10/2019        INTERPRETATION:  INDICATION: NSTEMI    Blood Pressure 157/82    Height 154.9 cm     Weight 52.3 kg       BSA   1.49 sq m    Dimensions:    LA 3.6       Normal Values: 2.0 - 4.0 cm    Ao 2.9        Normal Values: 2.0 - 3.8 cm  SEPTUM 1.0       Normal Values: 0.6 - 1.2 cm  PWT 0.9       Normal Values: 0.6 - 1.1 cm  LVIDd 4.2         Normal Values: 3.0 - 5.6 cm  LVIDs 3.4         Normal Values: 1.8 - 4.0 cm      OBSERVATIONS:  Technically difficult study  Mitral Valve: Thickened leaflets, moderate MR.  Aortic Valve/Aorta: Sclerotic trileaflet aortic valve. Mild AI  Tricuspid Valve: normal with mild TR.  Pulmonic Valve: Mild PI  Left Atrium: Enlarged  Right Atrium:normal  Left Ventricle: Endocardium not well-visualized, estimated LVEF of   45-50%. Cannot rule out segmental dysfunction  Right Ventricle: Not well-visualized  Pericardium/Pleura: normal, no significant pericardial effusion.  Pulmonary/RV Pressure:estimated PA systolic pressure of 43 mmHg assuming   an RA pressure of 10 mmHg.  LV diastolic dysfunction is present  Small right-sided pleural effusion    Conclusion:   Technically difficult study  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 Cannot rule out segmental dysfunction  Left atrial enlargement  Sclerotic trileaflet aortic valve. Mild AI  Thickened mitral valve leaflets with moderate MR  Mild TR  Small right-sided pleural effusion      JACQUELINE GOODEN   This document has been electronically signed. Jun 11 2019  8:16AM        RAD  < from: CT Head No Cont (12.28.19 @ 13:25) >  EXAM:  CT BRAIN                            PROCEDURE DATE:  12/28/2019          INTERPRETATION:  Clinical indication: Lethargy.    Multiple axial sections were performed from base of skull to vertex without contrast enhancement. Coronal and sagittal instructions were performed as well.    This exam is compared with prior noncontrast head CT performed on March 14, 2019.    Postop changes compatible with a left pterional craniectomy is again seen.    Aneurysm clip involving the left parasellar region is again seen and unchanged.    Parenchymal volume loss and chronic microvascular changes again seen.    Abnormal area of low-attenuation involving the superior left frontal/parietal subcortical region is again seen and unchanged. This likely compatible with an area of gliosis secondary to an old infarct.    There is no evidence acute hemorrhage mass mass effect in the posterior fossa or supratentorial region.    Evaluation of the osseous structures with the appropriate window aside from postop changes appears unremarkable    Small air-fluid level is seen involving the left maxillary sinus.    Both mastoid and middle ear regions appear clear.    Impression: Stable exam.                GOKUL OGDEN M.D., ATTENDING RADIOLOGIST  This document has been electronically signed. Dec 28 2019  1:34PM    < end of copied text >  < from: CT Angio Chest w/ IV Cont (12.28.19 @ 13:25) >  EXAM:  CT ANGIO CHEST (W)AW IC                            PROCEDURE DATE:  12/28/2019          INTERPRETATION:  CT ANGIO CHEST WITHOUT AND OR WITH IV CONTRAST    CLINICAL INFORMATION:  short of breath, atrial fibrillation    TECHNIQUE: Contrast enhanced CT pulmonary angiogram was performed. Multiplanar CT and HRCT images were reviewed. Maximum intensity projection (MIP) images are reconstructed as per CT angiography protocol. Images were acquired during the administration of 61 cc Omnipaque 350IV contrast. This study was performed using automatic exposure control (radiation dose reduction software) to obtain a diagnostic image quality scan with patient dose as low as reasonably achievable.    COMPARISON: same day CXR, CTA chest 6/8/2019    PULMONARY ARTERIES: No pulmonary embolism.    LUNGS, AIRWAYS: The central airways are patent. There is emphysema. Bibasilar mild fibrosis. No focal consolidation. Right midlung groundglass opacity.    PLEURA: No pleural abnormality.    VESSELS: Aortic atherosclerosis without aneurysm. No dissection.    HEART: Normal heart size. No pericardial effusion. Coronary artery calcifications are present.    MEDIASTINUM, ALEN, AXILLAE: No adenopathy.    UPPER ABDOMEN: Gallstones and renal stones. 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    BONES AND CHEST WALL: No acute bony abnormality.    IMPRESSION:     No pulmonary embolism.    Right midlung groundglass opacity. Findings may represent acute infection or inflammation versus progression of pulmonary fibrosis.                GRAZYNA GAGNON M.D., ATTENDING RADIOLOGIST  This document has been electronically signed. Dec 28 2019  2:02PM    < end of copied text >

## 2020-01-01 NOTE — PROGRESS NOTE ADULT - PROBLEM SELECTOR PLAN 2
- no concern for active bleeding at this point, patient restarted on coumadin  - continue to trend CBC in AM  - Hematology/oncology consulted, Dr. Jaimes - anemia probably multifactorial in etiology.

## 2020-01-01 NOTE — PROGRESS NOTE ADULT - PROBLEM SELECTOR PLAN 1
pt. denies any further episodes of epistaxis  - Hgb improved at 10.6 today  - continue to use nasal saline humidification as per ENT  - will continue coumadin as no active bleed at this time and risks vs benefits for afib, daughter and patient agree  - will dose 5mg tonight, as well, goal INR of 2-3 and has been slowly rising (at home pt takes only 2.5mg six days per weeks and 5mg once per week, but did come in subtherapeutic.)

## 2020-01-01 NOTE — PROGRESS NOTE ADULT - PROBLEM SELECTOR PLAN 7
-hx of CVA in 1990s and cerebral aneurysm s/p clipping  -Continue ASA 81, statin -pt is not endorsing abd pain anymore, unclear etiology  -CT Abd/P did not elucidate a cause, no longer complaining of GI symptoms  - GI, Dr. Lassiter, recs appreciated  - Vascular surgery (Dr. Sánchez) consulted regarding the incidental finding on the CT on 12/30 that was read as AAA that is same in size as in 06/2019, but now has thrombus visualized within the peripheral lumen. No need for surgical intervention or other change in management as per vasc surgery. Recommendation was for annual CT monitoring.

## 2020-01-01 NOTE — PROGRESS NOTE ADULT - PROBLEM SELECTOR PLAN 5
- suspected aspiration PNA due to epistaxis  - will continue zosyn for now as per pulmonology recs, 5-7 days of Abx (today is day 5)  - pt's CT on admission was concerning for RML PNA and she had a high procalcitonin that was trending down on Abx  -pt has improved  - Pulmonology, Dr. Rene, recs appreciated - will continue coumadin as no active bleed at this time and risks vs benefits for afib, daughter and patient agree  - will dose 5mg tonight, as well, goal INR of 2-3 and has been slowly rising (at home pt takes only 2.5mg six days per weeks and 5mg once per week, but did come in subtherapeutic.)  -decision had been made between pt and physicians earlier in admission to not bridge in order to decrease chance of recurrence of bleeding

## 2020-01-01 NOTE — PROGRESS NOTE ADULT - PROBLEM SELECTOR PLAN 1
seen and examined, Vascular eval noted, ct abd reviewed, GOC noted and documented, full code -   [facility] rx regimen - BP control - valvular heart disease - hx of CVA    epistaxis - ENT eval noted - nasal spray -   Heme eval noted  Emphysema - CT chest reviewed - poss PNA - Procalcitonin Noted -   cont Zosyn - min of 5 days duration and cont NEBS tid for COPD Emphysema  monitor Hgb  monitor vs and HD and Sat  pt is on room air  assist with ADL  out of bed with assist.

## 2020-01-01 NOTE — PROGRESS NOTE ADULT - ASSESSMENT
84/F w/ PMHx of afib (coumadin), CVA and hx of aneurysm repair, HTN, HLD, mitral valve prolapse, PVD s/p stents x 3 (RLE), rheumatoid arthritis, cataracts of the L eye BIBEMS to ED for evaluation of epistaxis found with elevated bp in setting of back pain     Uncontrolled HTN/Hypertensive Emergency  -Labile over last 24 hours more controlled this AM   - likely reactive to pain as patient is in 10/10 back pain awaiting narcotics PO   - continue with Cardizem, metoprolol and Hydralazine   - No evidence of active ischemia or volume overload    Atrial fibrillation  -  controlled per flowsheet  -CW CCB, BB  - Dose coumadin for goal INR 2-3   - INR 1.6  -monitor closely for bleeding     Hypokalemia  -improving  - supplement for K > 4     Hx of CVA/PVD  s/p stent  - Continue aspirin 81 QD  - Continue statin drug    vascular  -Seen by vascular for AAA- for yearly CT    Anemia  -Seen by Hem onc- monitor as per primary team   -Sp 1 unit PRBC no further epistaxis noted     [facility]  Cardiology   Spectra #[phone]

## 2020-01-01 NOTE — PROGRESS NOTE ADULT - PROBLEM SELECTOR PLAN 6
-pt is not endorsing abd pain anymore, unclear etiology  -CT Abd/P did not elucidate a cause, no longer complaining of GI symptoms  - GI, Dr. Lassiter, recs appreciated  - Vascular surgery (Dr. Sánchez) consulted regarding the incidental finding on the CT on 12/30 that was read as AAA that is same in size as in 06/2019, but now has thrombus visualized within the peripheral lumen. No need for surgical intervention or other change in management as per vasc surgery. Recommendation was for annual CT monitoring. - suspected aspiration PNA due to epistaxis  - will continue zosyn for now as per pulmonology recs, 5-7 days of Abx (today is day 5)  - pt's CT on admission was concerning for RML PNA and she had a high procalcitonin that was trending down on Abx  -pt has improved  - Pulmonology, Dr. Rene, recs appreciated

## 2020-01-01 NOTE — PROGRESS NOTE ADULT - PROBLEM SELECTOR PLAN 10
DVT prophylaxis: c/w coumadin    11. BRIDGET:  -pt with mild BRIDGET  -encouraged po hydration   -monitor BMP, if has further worsening consider abdominal imaging DVT prophylaxis: c/w coumadin    11. CVA:   -hx of CVA in 1990s and cerebral aneurysm s/p clipping  -Continue ASA 81, statin

## 2020-01-02 ENCOUNTER — TRANSCRIPTION ENCOUNTER (OUTPATIENT)
Age: 85
End: 2020-01-02

## 2020-01-02 DIAGNOSIS — N17.9 ACUTE KIDNEY FAILURE, UNSPECIFIED: ICD-10-CM

## 2020-01-02 LAB
ANION GAP SERPL CALC-SCNC: 9 MMOL/L — SIGNIFICANT CHANGE UP (ref 5–17)
BUN SERPL-MCNC: 36 MG/DL — HIGH (ref 7–23)
CALCIUM SERPL-MCNC: 8.4 MG/DL — LOW (ref 8.5–10.1)
CHLORIDE SERPL-SCNC: 110 MMOL/L — HIGH (ref 96–108)
CO2 SERPL-SCNC: 23 MMOL/L — SIGNIFICANT CHANGE UP (ref 22–31)
CREAT SERPL-MCNC: 1.6 MG/DL — HIGH (ref 0.5–1.3)
GLUCOSE SERPL-MCNC: 94 MG/DL — SIGNIFICANT CHANGE UP (ref 70–99)
HCT VFR BLD CALC: 32.9 % — LOW (ref 34.5–45)
HGB BLD-MCNC: 10.2 G/DL — LOW (ref 11.5–15.5)
INR BLD: 1.71 RATIO — HIGH (ref 0.88–1.16)
MAGNESIUM SERPL-MCNC: 2 MG/DL — SIGNIFICANT CHANGE UP (ref 1.6–2.6)
MCHC RBC-ENTMCNC: 27.1 PG — SIGNIFICANT CHANGE UP (ref 27–34)
MCHC RBC-ENTMCNC: 31 GM/DL — LOW (ref 32–36)
MCV RBC AUTO: 87.3 FL — SIGNIFICANT CHANGE UP (ref 80–100)
NRBC # BLD: 0 /100 WBCS — SIGNIFICANT CHANGE UP (ref 0–0)
PHOSPHATE SERPL-MCNC: 2.8 MG/DL — SIGNIFICANT CHANGE UP (ref 2.5–4.5)
PLATELET # BLD AUTO: 197 K/UL — SIGNIFICANT CHANGE UP (ref 150–400)
POTASSIUM SERPL-MCNC: 3.6 MMOL/L — SIGNIFICANT CHANGE UP (ref 3.5–5.3)
POTASSIUM SERPL-SCNC: 3.6 MMOL/L — SIGNIFICANT CHANGE UP (ref 3.5–5.3)
PROTHROM AB SERPL-ACNC: 19.8 SEC — HIGH (ref 10–12.9)
RBC # BLD: 3.77 M/UL — LOW (ref 3.8–5.2)
RBC # FLD: 16.1 % — HIGH (ref 10.3–14.5)
SODIUM SERPL-SCNC: 142 MMOL/L — SIGNIFICANT CHANGE UP (ref 135–145)
WBC # BLD: 7.97 K/UL — SIGNIFICANT CHANGE UP (ref 3.8–10.5)
WBC # FLD AUTO: 7.97 K/UL — SIGNIFICANT CHANGE UP (ref 3.8–10.5)

## 2020-01-02 PROCEDURE — 99232 SBSQ HOSP IP/OBS MODERATE 35: CPT

## 2020-01-02 PROCEDURE — 99233 SBSQ HOSP IP/OBS HIGH 50: CPT | Mod: GC

## 2020-01-02 RX ORDER — POLYETHYLENE GLYCOL 3350 17 G/17G
17 POWDER, FOR SOLUTION ORAL DAILY
Refills: 0 | Status: DISCONTINUED | OUTPATIENT
Start: 2020-01-02 | End: 2020-01-04

## 2020-01-02 RX ORDER — WARFARIN SODIUM 2.5 MG/1
5 TABLET ORAL ONCE
Refills: 0 | Status: COMPLETED | OUTPATIENT
Start: 2020-01-02 | End: 2020-01-02

## 2020-01-02 RX ORDER — POTASSIUM CHLORIDE 20 MEQ
40 PACKET (EA) ORAL ONCE
Refills: 0 | Status: COMPLETED | OUTPATIENT
Start: 2020-01-02 | End: 2020-01-02

## 2020-01-02 RX ORDER — SODIUM CHLORIDE 9 MG/ML
500 INJECTION INTRAMUSCULAR; INTRAVENOUS; SUBCUTANEOUS ONCE
Refills: 0 | Status: COMPLETED | OUTPATIENT
Start: 2020-01-02 | End: 2020-01-02

## 2020-01-02 RX ORDER — SODIUM CHLORIDE 9 MG/ML
1000 INJECTION INTRAMUSCULAR; INTRAVENOUS; SUBCUTANEOUS
Refills: 0 | Status: DISCONTINUED | OUTPATIENT
Start: 2020-01-02 | End: 2020-01-04

## 2020-01-02 RX ORDER — PIPERACILLIN AND TAZOBACTAM 4; .5 G/20ML; G/20ML
3.38 INJECTION, POWDER, LYOPHILIZED, FOR SOLUTION INTRAVENOUS EVERY 12 HOURS
Refills: 0 | Status: COMPLETED | OUTPATIENT
Start: 2020-01-02 | End: 2020-01-03

## 2020-01-02 RX ADMIN — SENNA PLUS 2 TABLET(S): 8.6 TABLET ORAL at 21:51

## 2020-01-02 RX ADMIN — Medication 1 TABLET(S): at 11:45

## 2020-01-02 RX ADMIN — Medication 1 SPRAY(S): at 17:11

## 2020-01-02 RX ADMIN — SODIUM CHLORIDE 75 MILLILITER(S): 9 INJECTION INTRAMUSCULAR; INTRAVENOUS; SUBCUTANEOUS at 11:46

## 2020-01-02 RX ADMIN — PANTOPRAZOLE SODIUM 40 MILLIGRAM(S): 20 TABLET, DELAYED RELEASE ORAL at 05:22

## 2020-01-02 RX ADMIN — Medication 81 MILLIGRAM(S): at 11:45

## 2020-01-02 RX ADMIN — ATORVASTATIN CALCIUM 40 MILLIGRAM(S): 80 TABLET, FILM COATED ORAL at 21:51

## 2020-01-02 RX ADMIN — SODIUM CHLORIDE 500 MILLILITER(S): 9 INJECTION INTRAMUSCULAR; INTRAVENOUS; SUBCUTANEOUS at 11:46

## 2020-01-02 RX ADMIN — Medication 1 GRAM(S): at 05:22

## 2020-01-02 RX ADMIN — Medication 1 SPRAY(S): at 11:45

## 2020-01-02 RX ADMIN — WARFARIN SODIUM 5 MILLIGRAM(S): 2.5 TABLET ORAL at 21:51

## 2020-01-02 RX ADMIN — PANTOPRAZOLE SODIUM 40 MILLIGRAM(S): 20 TABLET, DELAYED RELEASE ORAL at 17:10

## 2020-01-02 RX ADMIN — Medication 3 MILLILITER(S): at 15:21

## 2020-01-02 RX ADMIN — OXYCODONE AND ACETAMINOPHEN 1 TABLET(S): 5; 325 TABLET ORAL at 12:56

## 2020-01-02 RX ADMIN — Medication 1 GRAM(S): at 17:10

## 2020-01-02 RX ADMIN — OXYCODONE AND ACETAMINOPHEN 1 TABLET(S): 5; 325 TABLET ORAL at 21:45

## 2020-01-02 RX ADMIN — Medication 150 MILLIGRAM(S): at 05:21

## 2020-01-02 RX ADMIN — Medication 100 MILLIGRAM(S): at 05:22

## 2020-01-02 RX ADMIN — Medication 40 MILLIEQUIVALENT(S): at 08:06

## 2020-01-02 RX ADMIN — Medication 100 MILLIGRAM(S): at 21:51

## 2020-01-02 RX ADMIN — Medication 3 MILLILITER(S): at 07:10

## 2020-01-02 RX ADMIN — PIPERACILLIN AND TAZOBACTAM 25 GRAM(S): 4; .5 INJECTION, POWDER, LYOPHILIZED, FOR SOLUTION INTRAVENOUS at 05:23

## 2020-01-02 RX ADMIN — OXYCODONE AND ACETAMINOPHEN 1 TABLET(S): 5; 325 TABLET ORAL at 11:51

## 2020-01-02 RX ADMIN — Medication 1 SPRAY(S): at 05:24

## 2020-01-02 RX ADMIN — Medication 1 APPLICATION(S): at 13:32

## 2020-01-02 RX ADMIN — Medication 1 APPLICATION(S): at 21:51

## 2020-01-02 RX ADMIN — PIPERACILLIN AND TAZOBACTAM 25 GRAM(S): 4; .5 INJECTION, POWDER, LYOPHILIZED, FOR SOLUTION INTRAVENOUS at 17:11

## 2020-01-02 RX ADMIN — OXYCODONE AND ACETAMINOPHEN 1 TABLET(S): 5; 325 TABLET ORAL at 20:37

## 2020-01-02 RX ADMIN — Medication 240 MILLIGRAM(S): at 05:21

## 2020-01-02 RX ADMIN — POLYETHYLENE GLYCOL 3350 17 GRAM(S): 17 POWDER, FOR SOLUTION ORAL at 11:45

## 2020-01-02 RX ADMIN — Medication 100 MILLIGRAM(S): at 13:32

## 2020-01-02 RX ADMIN — Medication 1 APPLICATION(S): at 05:23

## 2020-01-02 NOTE — PROGRESS NOTE ADULT - PROBLEM SELECTOR PLAN 10
DVT prophylaxis: c/w coumadin for VTE prophylaxis     11. CVA:   -hx of CVA in 1990s and cerebral aneurysm s/p clipping  -Continue ASA 81, statin

## 2020-01-02 NOTE — PROGRESS NOTE ADULT - SUBJECTIVE AND OBJECTIVE BOX
Patient is a 84y old  Female who presents with a chief complaint of Epistaxis (02 Jan 2020 08:58)      FROM ADMISSION H+P:   HPI:  84/F w/ PMHx of afib (coumadin), CVA and hx of aneurysm repair, HTN, HLD, mitral valve prolapse, PVD s/p stents x 3 (RLE), rheumatoid arthritis, cataracts of the L eye BIBEMS to ED for evaluation of epistaxis. Patient noted to have start of nose bleed 12/25 evening when changing to get into bed, denies any nose-blowing or trauma, though she believes she may have sneezed at the onset. She noted "large drops" of blood at onset, at which time she applied an icepack to her nose, and tilted her head back, noted bleeding to slow, and went to bed. When she woke on the AM of 12/26 had blood all over his hands and pillow at which time EMS was activated. Upon waking on 12/26 she noted she felt weak and had to use the walls for support to ambulate. Denies any dizziness, SOB, chest pain, vision changes. Epistaxis noted to resolve without intervention in the ED. Denies any sick contacts or recent travel.     ED Vitals: T 97.5 HR 95 /78 RR 18 SpO2 97% on room air  Labs: WBC 13.15, anemic with HB 8.1, INR 1.79  CXR: Right perihilar airspace disease, atherosclerotic aorta. No effusion.  EKG: Pending at the time of admission  In the ED Patient received tylenol 650mg x 1 (27 Dec 2019 15:33)      ----  INTERVAL HPI/OVERNIGHT EVENTS: Pt seen and evaluated at the bedside. No acute overnight events occurred. Patient not complaining of any pain or nose bleeding.  Patient denies any SOB or weakness.  Patient states she feels a little dehydrated and this tends to happen to her when she is in the hospital.  Day 6 of Zosyn.    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  Osteoarthritis  Afib  PVD (peripheral vascular disease)  Neuropathy: (Right lower leg)  H/O cerebral aneurysm repair: brain clips  CVA (cerebral vascular accident): (&quot;Mini-stroke&quot;,1990&#x27;s)  Rheumatoid arthritis  Hyperlipidemia  Hypertension  S/P cataract surgery: (Left eye)  Elective surgery: (&quot;Twisted bowel&quot;, 2014)  Elective surgery: (Exision of cyst on liver, 1985)  S/P ORIF (open reduction internal fixation) fracture: Left hip fx (2012) &amp; R hip fx (2013)  H/O cerebral aneurysm repair: Brain clips (1978(  Renal stone: Cysto stent placement 10/1/2014  PVD (peripheral vascular disease): s/p RLE bypass x 3, most recent 3/2012 Right external iliac to PT bypass w/ PTFE (2012)  S/P FRED (total abdominal hysterectomy): (1987, Hx of &quot;ovarian cancer?&quot;)      FAMILY HISTORY:  No pertinent family history in first degree relatives      Allergies    No Known Allergies    Intolerances        ----  REVIEW OF SYSTEMS:  CONSTITUTIONAL: denies fever, chills, fatigue, weakness  HEENT: denies blurred vision, sore throat  SKIN: denies new lesions, rash  CARDIOVASCULAR: denies chest pain, chest pressure, palpitations  RESPIRATORY: denies shortness of breath, sputum production  GASTROINTESTINAL: denies nausea, vomiting, diarrhea, abdominal pain  GENITOURINARY: denies dysuria, discharge  NEUROLOGICAL: denies numbness, headache, focal weakness  MUSCULOSKELETAL: denies new joint pain, muscle aches  HEMATOLOGIC: denies gross bleeding, bruising      ----  PHYSICAL EXAM:  GENERAL: patient appears well, no acute distress, appropriately interactive  EYES: sclera clear, no exudates, right eye esotropia  ENMT: oropharynx clear without erythema, mucous membranes dry  NECK: supple, soft, no thyromegaly noted  LUNGS: good air entry bilaterally, clear to auscultation, symmetric breath sounds, no wheezing or rhonchi appreciated  HEART: soft S1/S2, regular rate and rhythm, no murmurs noted, no noted edema to b/l LE  GASTROINTESTINAL: abdomen is soft, nontender, nondistended, normoactive bowel sounds, no palpable masses  INTEGUMENT: good skin turgor, appropriate for ethnicity, appears well perfused, no jaundice noted  MUSCULOSKELETAL: no clubbing or cyanosis, swan neck deformity of b/l hands L worse than R.   NEUROLOGIC: awake, alert, oriented x3, good muscle tone in 4 extremities, no obvious sensory deficits  PSYCHIATRIC: mood is good, affect is congruent with mood, linear and logical thought process  HEME/LYMPH: no palpable supraclavicular nodules, no obvious ecchymosis     T(C): 36.3 (01-02-20 @ 11:15), Max: 36.9 (01-01-20 @ 20:03)  HR: 65 (01-02-20 @ 11:15) (65 - 80)  BP: 137/77 (01-02-20 @ 11:15) (123/68 - 152/71)  RR: 18 (01-02-20 @ 11:15) (17 - 18)  SpO2: 98% (01-02-20 @ 11:15) (94% - 99%)  Wt(kg): --    ----  I&O's Summary    01 Jan 2020 07:01  -  02 Jan 2020 07:00  --------------------------------------------------------  IN: 850 mL / OUT: 0 mL / NET: 850 mL        LABS:                        10.2   7.97  )-----------( 197      ( 02 Jan 2020 06:13 )             32.9     01-02    142  |  110<H>  |  36<H>  ----------------------------<  94  3.6   |  23  |  1.60<H>    Ca    8.4<L>      02 Jan 2020 06:13  Phos  2.8     01-02  Mg     2.0     01-02      PT/INR - ( 02 Jan 2020 06:13 )   PT: 19.8 sec;   INR: 1.71 ratio         PTT - ( 01 Jan 2020 06:56 )  PTT:30.7 sec    CAPILLARY BLOOD GLUCOSE                    ----  Personally reviewed:  Vital sign trends: [ x ] yes    [  ] no     [  ] n/a  Laboratory results: [ x ] yes    [  ] no     [  ] n/a  Radiology results: [  ] yes    [  ] no     [ x ] n/a  Culture results: [  ] yes    [  ] no     [ x ] n/a  Consultant recommendations: [ x ] yes    [  ] no     [  ] n/a

## 2020-01-02 NOTE — PROGRESS NOTE ADULT - PROBLEM SELECTOR PLAN 1
back on AC - Hgb Stable -   caution with Opioids -    seen and examined, Vascular eval noted, ct abd reviewed, GOC noted and documented, full code -   [facility]vs rx regimen - BP control - valvular heart disease - hx of CVA    epistaxis - ENT eval noted - nasal spray -   Heme eval noted  Emphysema - CT chest reviewed - poss PNA - Procalcitonin Noted -   cont Zosyn - min of 5 days duration and cont NEBS tid for COPD Emphysema  monitor Hgb  monitor vs and HD and Sat  pt is on room air  assist with ADL  out of bed with assist.

## 2020-01-02 NOTE — PROGRESS NOTE ADULT - SUBJECTIVE AND OBJECTIVE BOX
Date/Time Patient Seen:  		  Referring MD:   Data Reviewed	       Patient is a 84y old  Female who presents with a chief complaint of Epistaxis (01 Jan 2020 14:34)      Subjective/HPI     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  Osteoarthritis  Afib  PVD (peripheral vascular disease)  Neuropathy: (Right lower leg)  Gout  H/O cerebral aneurysm repair: brain clips  CVA (cerebral vascular accident): (&quot;Mini-stroke&quot;,1990&#x27;s)  Rheumatoid arthritis  Asthma  Hyperlipidemia  Hypertension  S/P cataract surgery: (Left eye)  Elective surgery: (&quot;Twisted bowel&quot;, 2014)  Elective surgery: (Exision of cyst on liver, 1985)  S/P ORIF (open reduction internal fixation) fracture: Left hip fx (2012) &amp; R hip fx (2013)  H/O cerebral aneurysm repair: Brain clips (1978(  Renal stone: Cysto stent placement 10/1/2014  PVD (peripheral vascular disease): s/p RLE bypass x 3, most recent 3/2012 Right external iliac to PT bypass w/ PTFE (2012)  S/P FRED (total abdominal hysterectomy): (1987, Hx of &quot;ovarian cancer?&quot;)        Medication list         MEDICATIONS  (STANDING):  albuterol/ipratropium for Nebulization 3 milliLiter(s) Nebulizer every 8 hours  aspirin enteric coated 81 milliGRAM(s) Oral daily  atorvastatin 40 milliGRAM(s) Oral at bedtime  BACItracin   Ointment 1 Application(s) Topical three times a day  diltiazem    milliGRAM(s) Oral daily  hydrALAZINE 100 milliGRAM(s) Oral every 8 hours  metoprolol succinate  milliGRAM(s) Oral daily  multivitamin 1 Tablet(s) Oral daily  pantoprazole    Tablet 40 milliGRAM(s) Oral two times a day  piperacillin/tazobactam IVPB.. 3.375 Gram(s) IV Intermittent every 8 hours  senna 2 Tablet(s) Oral at bedtime  sodium chloride 0.65% Nasal 1 Spray(s) Both Nostrils four times a day  sucralfate 1 Gram(s) Oral two times a day  warfarin 5 milliGRAM(s) Oral once    MEDICATIONS  (PRN):  acetaminophen   Tablet .. 650 milliGRAM(s) Oral every 6 hours PRN Mild Pain (1 - 3)  aluminum hydroxide/magnesium hydroxide/simethicone Suspension 30 milliLiter(s) Oral every 6 hours PRN Dyspepsia  lidocaine   Patch 1 Patch Transdermal daily PRN back pain  melatonin 5 milliGRAM(s) Oral at bedtime PRN Insomnia  oxycodone    5 mG/acetaminophen 325 mG 1 Tablet(s) Oral every 8 hours PRN Severe Pain (7 - 10)  trimethobenzamide 300 milliGRAM(s) Oral three times a day PRN Nausea and/or Vomiting         Vitals log        ICU Vital Signs Last 24 Hrs  T(C): 36.6 (02 Jan 2020 07:24), Max: 36.9 (01 Jan 2020 20:03)  T(F): 97.8 (02 Jan 2020 07:24), Max: 98.4 (01 Jan 2020 20:03)  HR: 69 (02 Jan 2020 07:24) (65 - 80)  BP: 131/79 (02 Jan 2020 07:24) (123/68 - 152/71)  BP(mean): --  ABP: --  ABP(mean): --  RR: 18 (02 Jan 2020 07:24) (17 - 18)  SpO2: 99% (02 Jan 2020 07:24) (94% - 99%)           Input and Output:  I&O's Detail    01 Jan 2020 07:01  -  02 Jan 2020 07:00  --------------------------------------------------------  IN:    Oral Fluid: 650 mL    Solution: 200 mL  Total IN: 850 mL    OUT:  Total OUT: 0 mL    Total NET: 850 mL          Lab Data                        10.2   7.97  )-----------( 197      ( 02 Jan 2020 06:13 )             32.9     01-02    142  |  110<H>  |  36<H>  ----------------------------<  94  3.6   |  23  |  1.60<H>    Ca    8.4<L>      02 Jan 2020 06:13  Phos  2.8     01-02  Mg     2.0     01-02              Review of Systems	      Objective     Physical Examination    head at  heart s1s2  lung dec BS  abd soft  head nc      Pertinent Lab findings & Imaging      Fransico:  NO   Adequate UO     I&O's Detail    01 Jan 2020 07:01  -  02 Jan 2020 07:00  --------------------------------------------------------  IN:    Oral Fluid: 650 mL    Solution: 200 mL  Total IN: 850 mL    OUT:  Total OUT: 0 mL    Total NET: 850 mL               Discussed with:     Cultures:	        Radiology

## 2020-01-02 NOTE — DISCHARGE NOTE PROVIDER - HOSPITAL COURSE
FROM ADMISSION H+P:     HPI:    84/F w/ PMHx of afib (coumadin), CVA and hx of aneurysm repair, HTN, HLD, mitral valve prolapse, PVD s/p stents x 3 (RLE), rheumatoid arthritis, cataracts of the L eye BIBEMS to ED for evaluation of epistaxis. Patient noted to have start of nose bleed 12/25 evening when changing to get into bed, denies any nose-blowing or trauma, though she believes she may have sneezed at the onset. She noted "large drops" of blood at onset, at which time she applied an icepack to her nose, and tilted her head back, noted bleeding to slow, and went to bed. When she woke on the AM of 12/26 had blood all over his hands and pillow at which time EMS was activated. Upon waking on 12/26 she noted she felt weak and had to use the walls for support to ambulate. Denies any dizziness, SOB, chest pain, vision changes. Epistaxis noted to resolve without intervention in the ED. Denies any sick contacts or recent travel.         ED Vitals: T 97.5 HR 95 /78 RR 18 SpO2 97% on room air    Labs: WBC 13.15, anemic with HB 8.1, INR 1.79    CXR: Right perihilar airspace disease, atherosclerotic aorta. No effusion.    EKG: Pending at the time of admission    In the ED Patient received tylenol 650mg x 1 (27 Dec 2019 15:33)            ---    HOSPITAL COURSE: Admitted with weakness and acute blood loss anemia from epistaxis.  Cardiology (Terrie) was consulted for uncontrolled hypertension ultimately causing the epistaxis.  ENT was consulted for epistaxis and recommended humidified nasal saline.  Hematology (Marina) was consulted and patient received 1 unit of pRBCs and her H/H stabilized.  Patient treated with a 7 days course of antibiotics for aspiration pneumonia.   Patient was found to have a thrombus in the AAA which was deemed stable and not a surgical indication by Vascular surgery (Princess).  Patient developed BRIDGET which resolved with IV fluids.  PT recommended TIFFANIE upon disposition.          ---    CONSULTANTS:         ---    TIME SPENT:    The total amount of time spent reviewing the hospital notes, laboratory values, imaging findings, assessing/counseling the patient, discussing with consultant physicians, social work, nursing staff took -- minutes        ---    Primary care provider was made aware of plan for discharge:      [  ] NO     [  ] YES FROM ADMISSION H+P:     HPI:    84/F w/ PMHx of afib (coumadin), CVA and hx of aneurysm repair, HTN, HLD, mitral valve prolapse, PVD s/p stents x 3 (RLE), rheumatoid arthritis, cataracts of the L eye BIBEMS to ED for evaluation of epistaxis. Patient noted to have start of nose bleed 12/25 evening when changing to get into bed, denies any nose-blowing or trauma, though she believes she may have sneezed at the onset. She noted "large drops" of blood at onset, at which time she applied an icepack to her nose, and tilted her head back, noted bleeding to slow, and went to bed. When she woke on the AM of 12/26 had blood all over his hands and pillow at which time EMS was activated. Upon waking on 12/26 she noted she felt weak and had to use the walls for support to ambulate. Denies any dizziness, SOB, chest pain, vision changes. Epistaxis noted to resolve without intervention in the ED. Denies any sick contacts or recent travel.         ED Vitals: T 97.5 HR 95 /78 RR 18 SpO2 97% on room air    Labs: WBC 13.15, anemic with HB 8.1, INR 1.79    CXR: Right perihilar airspace disease, atherosclerotic aorta. No effusion.    EKG: Pending at the time of admission    In the ED Patient received tylenol 650mg x 1 (27 Dec 2019 15:33)            ---    HOSPITAL COURSE: Admitted with weakness and acute blood loss anemia from epistaxis.  Cardiology (Terrie) was consulted for uncontrolled hypertension ultimately causing the epistaxis.  ENT was consulted for epistaxis and recommended humidified nasal saline.  Hematology (Marina) was consulted and patient received 1 unit of pRBCs and her H/H stabilized.  Patient treated with a 7 days course of antibiotics for aspiration pneumonia.   Patient was found to have a thrombus in the AAA which was deemed stable and not a surgical indication by Vascular surgery (Princess).  Patient developed BRIDGET which resolved with IV fluids.  PT recommended TIFFANIE upon disposition.          ---    CONSULTANTS:     Marina (Hematology)    Cardiology (Terrie)    ---    TIME SPENT:    The total amount of time spent reviewing the hospital notes, laboratory values, imaging findings, assessing/counseling the patient, discussing with consultant physicians, social work, nursing staff took -- minutes        ---    Primary care provider was made aware of plan for discharge:      [  ] NO     [  ] YES FROM ADMISSION H+P:     HPI:    84/F w/ PMHx of afib (coumadin), CVA and hx of aneurysm repair, HTN, HLD, mitral valve prolapse, PVD s/p stents x 3 (RLE), rheumatoid arthritis, cataracts of the L eye BIBEMS to ED for evaluation of epistaxis. Patient noted to have start of nose bleed 12/25 evening when changing to get into bed, denies any nose-blowing or trauma, though she believes she may have sneezed at the onset. She noted "large drops" of blood at onset, at which time she applied an icepack to her nose, and tilted her head back, noted bleeding to slow, and went to bed. When she woke on the AM of 12/26 had blood all over his hands and pillow at which time EMS was activated. Upon waking on 12/26 she noted she felt weak and had to use the walls for support to ambulate. Denies any dizziness, SOB, chest pain, vision changes. Epistaxis noted to resolve without intervention in the ED. Denies any sick contacts or recent travel.         ED Vitals: T 97.5 HR 95 /78 RR 18 SpO2 97% on room air    Labs: WBC 13.15, anemic with HB 8.1, INR 1.79    CXR: Right perihilar airspace disease, atherosclerotic aorta. No effusion.    EKG: Pending at the time of admission    In the ED Patient received tylenol 650mg x 1 (27 Dec 2019 15:33)        ---    HOSPITAL COURSE: Admitted with weakness and acute blood loss anemia from epistaxis.  Cardiology (Terrie) was consulted for uncontrolled hypertension ultimately causing the epistaxis.  ENT was consulted, and impression likely 2/2 dry mucous membranes; recomms were for humidified O2.  Pt remained w/o further episodes epistaxis. Hematology (Marina) was consulted for acute blood loss anemia; pt transfused x 1unit of pRBCs w/hgb subsequently stabilizing in 9s range.  Patient treated with a 7d course of abxs tx w/zosyn for possible aspiration pneumonia.  On imaging, pt was noted to have stable AAA, w/new thrombus noted in lumen - Vasc Surgery was consulted re new thrombus, and recomms were for no acute intervention - pt can continue f/u as o/p.  Pt also managed for BRIDGET w/Cr 1.6 on admit - presumed 2/2 volume depletion, and pt tx'd w/IVFs- Cr improved back to normal range by time of discharge.     PT recommended TIFFANIE at d/c, and arrangements made by SW/CM        ---    CONSULTANTS:     Marina (Hematology)    Cardiology ([facility])    ---    TIME SPENT:    The total amount of time spent reviewing the hospital notes, laboratory values, imaging findings, assessing/counseling the patient, discussing with consultant physicians, social work, nursing staff took -- minutes        ---    Primary care provider was made aware of plan for discharge:      [  ] NO     [  ] YES

## 2020-01-02 NOTE — PROGRESS NOTE ADULT - PROBLEM SELECTOR PLAN 3
-pt with mild BRIDGET  -encouraged po hydration yesterday without improvement, will give 500mL bolus and 12hr maintenance fluids.  -monitor BMP, if has further worsening consider abdominal imaging

## 2020-01-02 NOTE — DISCHARGE NOTE PROVIDER - NSDCMRMEDTOKEN_GEN_ALL_CORE_FT
aspirin 81 mg oral delayed release tablet: 1 tab(s) orally once a day  atorvastatin 40 mg oral tablet: 1 tab(s) orally once a day (at bedtime)  Coumadin 2.5 mg oral tablet: 1 tab(s) orally once a day Monday-Saturday  Coumadin 5 mg oral tablet: 1 tab(s) orally once a day for 1 days - sunday  dilTIAZem 120 mg/24 hours oral capsule, extended release: 1 cap(s) orally once a day  hydrALAZINE 25 mg oral tablet: 1 tab(s) orally 3 times a day  melatonin 5 mg oral tablet: 1 tab(s) orally once a day (at bedtime), As Needed  metoprolol succinate 100 mg oral tablet, extended release: 1.5 tab(s) orally once a day  multivitamin:   once a day  pantoprazole 40 mg oral delayed release tablet: 1 tab(s) orally every 12 hours  senna oral tablet: 2 tab(s) orally once a day (at bedtime) aspirin 81 mg oral delayed release tablet: 1 tab(s) orally once a day  atorvastatin 40 mg oral tablet: 1 tab(s) orally once a day (at bedtime)  Coumadin 2.5 mg oral tablet: 1 tab(s) orally once a day Monday-Saturday  Coumadin 5 mg oral tablet: 1 tab(s) orally once a day for 1 days - sunday  dilTIAZem 240 mg/24 hours oral capsule, extended release: 1 cap(s) orally once a day  hydrALAZINE 100 mg oral tablet: 1 tab(s) orally every 8 hours  ipratropium-albuterol 0.5 mg-2.5 mg/3 mLinhalation solution: 3 milliliter(s) inhaled every 8 hours  melatonin 5 mg oral tablet: 1 tab(s) orally once a day (at bedtime), As Needed  metoprolol succinate 100 mg oral tablet, extended release: 1.5 tab(s) orally once a day  multivitamin:   once a day  pantoprazole 40 mg oral delayed release tablet: 1 tab(s) orally every 12 hours  polyethylene glycol 3350 oral powder for reconstitution: 17 gram(s) orally once a day  senna oral tablet: 2 tab(s) orally once a day (at bedtime)  sucralfate 1 g oral tablet: 1 tab(s) orally 2 times a day

## 2020-01-02 NOTE — PROGRESS NOTE ADULT - PROBLEM SELECTOR PLAN 1
pt. denies any further episodes of epistaxis  - Hgb improved at 10.2 today  - continue to use nasal saline humidification as per ENT  - will continue coumadin as no active bleed at this time and risks vs benefits for afib, daughter and patient agree  - will dose 5mg tonight, as well, goal INR of 2-3 and has been slowly rising (at home pt takes only 2.5mg six days per weeks and 5mg once per week, but did come in subtherapeutic.)

## 2020-01-02 NOTE — PROGRESS NOTE ADULT - PROBLEM SELECTOR PLAN 5
- will continue coumadin as no active bleed at this time and risks vs benefits for afib, daughter and patient agree  - will dose 5mg tonight, as well, goal INR of 2-3 and has been slowly rising (at home pt takes only 2.5mg six days per weeks and 5mg once per week, but did come in subtherapeutic.)  -decision had been made between pt and physicians earlier in admission to not bridge in order to decrease chance of recurrence of bleeding

## 2020-01-02 NOTE — DISCHARGE NOTE PROVIDER - PROVIDER TOKENS
PROVIDER:[TOKEN:[[id number]],FOLLOWUP:[1 week]] PROVIDER:[TOKEN:[[id number]],FOLLOWUP:[1 week]],PROVIDER:[TOKEN:[[id number]]]

## 2020-01-02 NOTE — DISCHARGE NOTE PROVIDER - CARE PROVIDER_API CALL
Tahir Lockhart (DO)  Family Medicine  [address] [place] [address], [address]  [place] [address]  Phone: [phone]  Fax: [phone]  Follow Up Time: 1 week Tahir Lockhart (DO)  Family Medicine  [address] [facility], [address]  [place] [address]  Phone: [phone]  Fax: [phone]  Follow Up Time: 1 week    Clif Sánchez)  Surgery  [address] [facility], [address]  [place], [place] [id number]  Phone: [phone]  Fax: [phone]  Follow Up Time:

## 2020-01-02 NOTE — DISCHARGE NOTE PROVIDER - NSDCCPCAREPLAN_GEN_ALL_CORE_FT
PRINCIPAL DISCHARGE DIAGNOSIS  Diagnosis: Epistaxis  Assessment and Plan of Treatment: likely due to uncontrolled hypertension.  Please follow up with your primary care doctor.      SECONDARY DISCHARGE DIAGNOSES  Diagnosis: BRIDGET (acute kidney injury)  Assessment and Plan of Treatment: BRIDGET (acute kidney injury)    Diagnosis: Pneumonia, aspiration  Assessment and Plan of Treatment: Pneumonia, aspiration    Diagnosis: Emphysema of lung  Assessment and Plan of Treatment: Emphysema of lung    Diagnosis: Difficulty walking  Assessment and Plan of Treatment: PRINCIPAL DISCHARGE DIAGNOSIS  Diagnosis: Epistaxis  Assessment and Plan of Treatment: likely due to uncontrolled hypertension.  Please follow up with your primary care doctor and continue to take your hydralazine.      SECONDARY DISCHARGE DIAGNOSES  Diagnosis: BRIDGET (acute kidney injury)  Assessment and Plan of Treatment: BRIDGET resolved with IV fluids    Diagnosis: Afib  Assessment and Plan of Treatment: Afib on coumadin.  Continue monitoring INR and taking your diltiazem medication. Follow up with your primary care doctor.    Diagnosis: Pneumonia, aspiration  Assessment and Plan of Treatment: pneumonia treated with antibiotics, follow up with your primary care doctor. PRINCIPAL DISCHARGE DIAGNOSIS  Diagnosis: Epistaxis  Assessment and Plan of Treatment: likely due to uncontrolled hypertension.  Please follow up with your primary care doctor and continue to take your hydralazine.      SECONDARY DISCHARGE DIAGNOSES  Diagnosis: Chronic anticoagulation  Assessment and Plan of Treatment:     Diagnosis: Afib  Assessment and Plan of Treatment: Afib on coumadin.  Continue monitoring INR and taking your diltiazem medication. Follow up with your primary care doctor.    Diagnosis: Pneumonia, aspiration  Assessment and Plan of Treatment: pneumonia treated with antibiotics, follow up with your primary care doctor.    Diagnosis: BRIDGET (acute kidney injury)  Assessment and Plan of Treatment: BRIDGET resolved with IV fluids

## 2020-01-02 NOTE — PROGRESS NOTE ADULT - ASSESSMENT
84/F w/ PMHx of afib (coumadin), CVA and hx of aneurysm repair, HTN, HLD, mitral valve prolapse, PVD s/p stents x 3 (RLE), rheumatoid arthritis, cataracts of the L eye BIBEMS to ED for evaluation of epistaxis found with elevated bp in setting of back pain     Uncontrolled HTN/Hypertensive Emergency  -Initially with uncontrolled htn in setting of 10/10 back pain now resolved,   -Bp stable 131/79  - continue with Cardizem, metoprolol and Hydralazine   - No evidence of active ischemia or volume overload    Atrial fibrillation  -  controlled per flowsheet  -CW CCB, BB  - Dose coumadin for goal INR 2-3   -monitor closely for bleeding     Hypokalemia  -resolved     Hx of CVA/PVD  s/p stent  - Continue aspirin 81 QD  - Continue statin drug    vascular  -Seen by vascular for AAA- for yearly CT    Anemia  -Seen by Hem onc- monitor as per primary team   -Sp 1 unit PRBC no further epistaxis noted     BRIDGET  now with BRIDGET, monitor renal function closely consider renal consult if continues to increase     Renita Lira FNP-C  Cardiology NP  [id number] [phone]

## 2020-01-02 NOTE — PROGRESS NOTE ADULT - PROBLEM SELECTOR PLAN 7
-pt is not endorsing abd pain anymore, unclear etiology  -CT Abd/P did not elucidate a cause, no longer complaining of GI symptoms  - GI, Dr. Lassiter, recs appreciated  - Vascular surgery (Dr. Sánchez) consulted regarding the incidental finding on the CT on 12/30 that was read as AAA that is same in size as in 06/2019, but now has thrombus visualized within the peripheral lumen. No need for surgical intervention or other change in management as per vasc surgery. Recommendation was for annual CT monitoring.

## 2020-01-02 NOTE — DISCHARGE NOTE PROVIDER - NSDCFUSCHEDAPPT_GEN_ALL_CORE_FT
CARITO CONSTANTINO ; 01/17/2020 ; [facility] [place] CARITO CONSTANTINO ; 01/17/2020 ; [facility] CARIT[facility] CONSTANTINO ; 01/17/2020 [facility]

## 2020-01-02 NOTE — PROGRESS NOTE ADULT - ASSESSMENT
n/v/epigastric pain, resolved  anemia, multifactorial  epistaxis, resolved  constipation      clinically improved  ct scan reviewed, atherosclerotic disease noted in mesenteric vessel  suspect chronic mesenteric ischemia   cont ppi bid, carafate bid, maalox prn, tigan prn  bowel regimen prn  monitor cbc, transfuse prn  diet as tolerated  will follow

## 2020-01-02 NOTE — PROGRESS NOTE ADULT - PROBLEM SELECTOR PLAN 4
- pt with continued weakness, secondary to deconditioning  - apprec PT eval, recommend [facility] when medically stable  - expect d/c to [facility] in next 1-2 days  - f/up with SW

## 2020-01-02 NOTE — PROGRESS NOTE ADULT - PROBLEM SELECTOR PLAN 6
- suspected aspiration PNA due to epistaxis  - will continue zosyn for now as per pulmonology recs, 5-7 days of Abx (today is day 6)  - pt's CT on admission was concerning for RML PNA and she had a high procalcitonin that was trending down on Abx  -pt has improved  - Pulmonology, Dr. Rene, recs appreciated

## 2020-01-02 NOTE — PROGRESS NOTE ADULT - SUBJECTIVE AND OBJECTIVE BOX
INTERVAL HPI/OVERNIGHT EVENTS:  pt seen and examined  offers no gi complaints  no bm passing gas  tolerating po  no acute overnight events per rn    MEDICATIONS  (STANDING):  albuterol/ipratropium for Nebulization 3 milliLiter(s) Nebulizer every 8 hours  aspirin enteric coated 81 milliGRAM(s) Oral daily  atorvastatin 40 milliGRAM(s) Oral at bedtime  BACItracin   Ointment 1 Application(s) Topical three times a day  diltiazem    milliGRAM(s) Oral daily  hydrALAZINE 100 milliGRAM(s) Oral every 8 hours  metoprolol succinate  milliGRAM(s) Oral daily  multivitamin 1 Tablet(s) Oral daily  pantoprazole    Tablet 40 milliGRAM(s) Oral two times a day  piperacillin/tazobactam IVPB.. 3.375 Gram(s) IV Intermittent every 8 hours  senna 2 Tablet(s) Oral at bedtime  sodium chloride 0.65% Nasal 1 Spray(s) Both Nostrils four times a day  sucralfate 1 Gram(s) Oral two times a day  warfarin 5 milliGRAM(s) Oral once    MEDICATIONS  (PRN):  acetaminophen   Tablet .. 650 milliGRAM(s) Oral every 6 hours PRN Mild Pain (1 - 3)  aluminum hydroxide/magnesium hydroxide/simethicone Suspension 30 milliLiter(s) Oral every 6 hours PRN Dyspepsia  lidocaine   Patch 1 Patch Transdermal daily PRN back pain  melatonin 5 milliGRAM(s) Oral at bedtime PRN Insomnia  oxycodone    5 mG/acetaminophen 325 mG 1 Tablet(s) Oral every 8 hours PRN Severe Pain (7 - 10)  trimethobenzamide 300 milliGRAM(s) Oral three times a day PRN Nausea and/or Vomiting      Allergies    No Known Allergies    Intolerances        Review of Systems:    General:  No wt loss, fevers, chills, night sweats, fatigue   Eyes:  Good vision, no reported pain  ENT:  No sore throat, pain, runny nose, dysphagia  CV:  No pain, palpitations, hypo/hypertension  Resp:  No dyspnea, cough, tachypnea, wheezing  GI:  No pain, No nausea, No vomiting, No diarrhea, No constipation, No weight loss, No fever, No pruritis, No rectal bleeding, No melena, No dysphagia  :  No pain, bleeding, incontinence, nocturia  Muscle:  No pain, weakness  Neuro:  No weakness, tingling, memory problems  Psych:  No fatigue, insomnia, mood problems, depression  Endocrine:  No polyuria, polydypsia, cold/heat intolerance  Heme:  No petechiae, ecchymosis, easy bruisability  Skin:  No rash, tattoos, scars, edema      Vital Signs Last 24 Hrs  T(C): 36.6 (02 Jan 2020 07:24), Max: 36.9 (01 Jan 2020 20:03)  T(F): 97.8 (02 Jan 2020 07:24), Max: 98.4 (01 Jan 2020 20:03)  HR: 69 (02 Jan 2020 07:24) (65 - 80)  BP: 131/79 (02 Jan 2020 07:24) (123/68 - 152/71)  BP(mean): --  RR: 18 (02 Jan 2020 07:24) (17 - 18)  SpO2: 99% (02 Jan 2020 07:24) (94% - 99%)    PHYSICAL EXAM:    General:  lying in bed  HEENT:  NC/AT  Abdomen:  Soft, nt nd  Extremities:  no  edema  Neuro/Psych:  awake alert responds appropriately      LABS:                        10.2   7.97  )-----------( 197      ( 02 Jan 2020 06:13 )             32.9     01-02    142  |  110<H>  |  36<H>  ----------------------------<  94  3.6   |  23  |  1.60<H>    Ca    8.4<L>      02 Jan 2020 06:13  Phos  2.8     01-02  Mg     2.0     01-02      PT/INR - ( 02 Jan 2020 06:13 )   PT: 19.8 sec;   INR: 1.71 ratio         PTT - ( 01 Jan 2020 06:56 )  PTT:30.7 sec      RADIOLOGY & ADDITIONAL TESTS:

## 2020-01-02 NOTE — DISCHARGE NOTE PROVIDER - NSDCFUADDAPPT_GEN_ALL_CORE_FT
You must make your own appointments.  Please follow up with primary care and cardiology within one week of discharge.

## 2020-01-02 NOTE — PROGRESS NOTE ADULT - SUBJECTIVE AND OBJECTIVE BOX
[facility] -- Dahiana Bañuelos, Toney Horner Pannella, Patel, Savella  Office # [phone]      Follow Up:  htn    Subjective/Observations:   No events overnight resting comfortably in bed.  No complaints of chest pain, dyspnea, or palpitations reported. No signs of orthopnea or PND.  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  Osteoarthritis  Afib  PVD (peripheral vascular disease)  Neuropathy: (Right lower leg)  H/O cerebral aneurysm repair: brain clips  CVA (cerebral vascular accident): (&quot;Mini-stroke&quot;,1990&#x27;s)  Rheumatoid arthritis  Hyperlipidemia  Hypertension  S/P cataract surgery: (Left eye)  Elective surgery: (&quot;Twisted bowel&quot;, 2014)  Elective surgery: (Exision of cyst on liver, 1985)  S/P ORIF (open reduction internal fixation) fracture: Left hip fx (2012) &amp; R hip fx (2013)  H/O cerebral aneurysm repair: Brain clips (1978(  Renal stone: Cysto stent placement 10/1/2014  PVD (peripheral vascular disease): s/p RLE bypass x 3, most recent 3/2012 Right external iliac to PT bypass w/ PTFE (2012)  S/P FRED (total abdominal hysterectomy): (1987, Hx of &quot;ovarian cancer?&quot;)      MEDICATIONS  (STANDING):  albuterol/ipratropium for Nebulization 3 milliLiter(s) Nebulizer every 8 hours  aspirin enteric coated 81 milliGRAM(s) Oral daily  atorvastatin 40 milliGRAM(s) Oral at bedtime  BACItracin   Ointment 1 Application(s) Topical three times a day  diltiazem    milliGRAM(s) Oral daily  hydrALAZINE 100 milliGRAM(s) Oral every 8 hours  metoprolol succinate  milliGRAM(s) Oral daily  multivitamin 1 Tablet(s) Oral daily  pantoprazole    Tablet 40 milliGRAM(s) Oral two times a day  piperacillin/tazobactam IVPB.. 3.375 Gram(s) IV Intermittent every 8 hours  senna 2 Tablet(s) Oral at bedtime  sodium chloride 0.65% Nasal 1 Spray(s) Both Nostrils four times a day  sucralfate 1 Gram(s) Oral two times a day  warfarin 5 milliGRAM(s) Oral once    MEDICATIONS  (PRN):  acetaminophen   Tablet .. 650 milliGRAM(s) Oral every 6 hours PRN Mild Pain (1 - 3)  aluminum hydroxide/magnesium hydroxide/simethicone Suspension 30 milliLiter(s) Oral every 6 hours PRN Dyspepsia  lidocaine   Patch 1 Patch Transdermal daily PRN back pain  melatonin 5 milliGRAM(s) Oral at bedtime PRN Insomnia  oxycodone    5 mG/acetaminophen 325 mG 1 Tablet(s) Oral every 8 hours PRN Severe Pain (7 - 10)  trimethobenzamide 300 milliGRAM(s) Oral three times a day PRN Nausea and/or Vomiting      Allergies    No Known Allergies    Intolerances        Vital Signs Last 24 Hrs  T(C): 36.6 (02 Jan 2020 07:24), Max: 36.9 (01 Jan 2020 20:03)  T(F): 97.8 (02 Jan 2020 07:24), Max: 98.4 (01 Jan 2020 20:03)  HR: 69 (02 Jan 2020 07:24) (65 - 80)  BP: 131/79 (02 Jan 2020 07:24) (123/68 - 152/71)  BP(mean): --  RR: 18 (02 Jan 2020 07:24) (17 - 18)  SpO2: 99% (02 Jan 2020 07:24) (94% - 99%)    I&O's Summary    01 Jan 2020 07:01  -  02 Jan 2020 07:00  --------------------------------------------------------  IN: 850 mL / OUT: 0 mL / NET: 850 mL          PHYSICAL EXAM:  TELE: not on tele   Constitutional: NAD, awake and alert, frail  HEENT: Moist Mucous Membranes, Anicteric  Pulmonary: Non-labored, breath sounds are clear bilaterally, No wheezing, crackles or rhonchi  Cardiovascular: Regular, S1 and S2 nl, No murmurs, rubs, gallops or clicks  Gastrointestinal: Bowel Sounds present, soft, nontender.   Lymph: No lymphadenopathy. No peripheral edema.  Skin: No visible rashes or ulcers.  Psych:  Mood & affect appropriate    LABS: All Labs Reviewed:                        10.2   7.97  )-----------( 197      ( 02 Jan 2020 06:13 )             32.9                         9.9    7.90  )-----------( 189      ( 01 Jan 2020 06:56 )             32.1                         10.6   7.37  )-----------( 193      ( 31 Dec 2019 07:28 )             33.7     02 Jan 2020 06:13    142    |  110    |  36     ----------------------------<  94     3.6     |  23     |  1.60   01 Jan 2020 06:56    142    |  110    |  34     ----------------------------<  99     3.7     |  24     |  1.60   31 Dec 2019 07:28    143    |  110    |  34     ----------------------------<  99     3.3     |  24     |  1.20     Ca    8.4        02 Jan 2020 06:13  Ca    8.4        01 Jan 2020 06:56  Ca    8.6        31 Dec 2019 07:28  Phos  2.8       02 Jan 2020 06:13  Phos  2.4       01 Jan 2020 06:56  Mg     2.0       02 Jan 2020 06:13  Mg     2.1       01 Jan 2020 06:56      PT/INR - ( 02 Jan 2020 06:13 )   PT: 19.8 sec;   INR: 1.71 ratio         PTT - ( 01 Jan 2020 06:56 )  PTT:30.7 sec         ECG:  < from: 12 Lead ECG (12.27.19 @ 16:04) >    Ventricular Rate 87 BPM    Atrial Rate 87 BPM    P-R Interval 166 ms    QRS Duration 98 ms    Q-T Interval 410 ms    QTC Calculation(Bezet) 493 ms    P Axis 45 degrees    R Axis -34 degrees    T Axis 2 degrees    Diagnosis Line Normal sinus rhythm  Left axis deviation  Minimal voltage criteria for LVH, may be normal variant    < end of copied text >        Echo:      < from: TTE Echo Doppler w/o Cont (06.10.19 @ 14:06) >  Conclusion:   Technically difficult study  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 Cannot rule out segmental dysfunction  Left atrial enlargement  Sclerotic trileaflet aortic valve. Mild AI  Thickened mitral valve leaflets with moderate MR  Mild TR  Small right-sided pleural effusion        < end of copied text >    Radiology:

## 2020-01-03 LAB
ANION GAP SERPL CALC-SCNC: 7 MMOL/L — SIGNIFICANT CHANGE UP (ref 5–17)
BUN SERPL-MCNC: 34 MG/DL — HIGH (ref 7–23)
CALCIUM SERPL-MCNC: 8.2 MG/DL — LOW (ref 8.5–10.1)
CHLORIDE SERPL-SCNC: 113 MMOL/L — HIGH (ref 96–108)
CO2 SERPL-SCNC: 24 MMOL/L — SIGNIFICANT CHANGE UP (ref 22–31)
CREAT SERPL-MCNC: 1.3 MG/DL — SIGNIFICANT CHANGE UP (ref 0.5–1.3)
GLUCOSE SERPL-MCNC: 98 MG/DL — SIGNIFICANT CHANGE UP (ref 70–99)
HCT VFR BLD CALC: 31.3 % — LOW (ref 34.5–45)
HGB BLD-MCNC: 9.5 G/DL — LOW (ref 11.5–15.5)
INR BLD: 1.97 RATIO — HIGH (ref 0.88–1.16)
MAGNESIUM SERPL-MCNC: 2 MG/DL — SIGNIFICANT CHANGE UP (ref 1.6–2.6)
MCHC RBC-ENTMCNC: 26.8 PG — LOW (ref 27–34)
MCHC RBC-ENTMCNC: 30.4 GM/DL — LOW (ref 32–36)
MCV RBC AUTO: 88.2 FL — SIGNIFICANT CHANGE UP (ref 80–100)
NRBC # BLD: 0 /100 WBCS — SIGNIFICANT CHANGE UP (ref 0–0)
PHOSPHATE SERPL-MCNC: 2.4 MG/DL — LOW (ref 2.5–4.5)
PLATELET # BLD AUTO: 196 K/UL — SIGNIFICANT CHANGE UP (ref 150–400)
POTASSIUM SERPL-MCNC: 4 MMOL/L — SIGNIFICANT CHANGE UP (ref 3.5–5.3)
POTASSIUM SERPL-SCNC: 4 MMOL/L — SIGNIFICANT CHANGE UP (ref 3.5–5.3)
PROTHROM AB SERPL-ACNC: 22.7 SEC — HIGH (ref 10–12.9)
RBC # BLD: 3.55 M/UL — LOW (ref 3.8–5.2)
RBC # FLD: 16.2 % — HIGH (ref 10.3–14.5)
SODIUM SERPL-SCNC: 144 MMOL/L — SIGNIFICANT CHANGE UP (ref 135–145)
WBC # BLD: 7.48 K/UL — SIGNIFICANT CHANGE UP (ref 3.8–10.5)
WBC # FLD AUTO: 7.48 K/UL — SIGNIFICANT CHANGE UP (ref 3.8–10.5)

## 2020-01-03 PROCEDURE — 99232 SBSQ HOSP IP/OBS MODERATE 35: CPT

## 2020-01-03 PROCEDURE — 99233 SBSQ HOSP IP/OBS HIGH 50: CPT | Mod: GC

## 2020-01-03 RX ORDER — WARFARIN SODIUM 2.5 MG/1
5 TABLET ORAL ONCE
Refills: 0 | Status: COMPLETED | OUTPATIENT
Start: 2020-01-03 | End: 2020-01-03

## 2020-01-03 RX ADMIN — Medication 81 MILLIGRAM(S): at 11:21

## 2020-01-03 RX ADMIN — Medication 1 GRAM(S): at 05:26

## 2020-01-03 RX ADMIN — Medication 3 MILLILITER(S): at 19:47

## 2020-01-03 RX ADMIN — Medication 150 MILLIGRAM(S): at 05:26

## 2020-01-03 RX ADMIN — Medication 1 APPLICATION(S): at 21:03

## 2020-01-03 RX ADMIN — WARFARIN SODIUM 5 MILLIGRAM(S): 2.5 TABLET ORAL at 21:03

## 2020-01-03 RX ADMIN — OXYCODONE AND ACETAMINOPHEN 1 TABLET(S): 5; 325 TABLET ORAL at 14:17

## 2020-01-03 RX ADMIN — PANTOPRAZOLE SODIUM 40 MILLIGRAM(S): 20 TABLET, DELAYED RELEASE ORAL at 17:29

## 2020-01-03 RX ADMIN — PIPERACILLIN AND TAZOBACTAM 25 GRAM(S): 4; .5 INJECTION, POWDER, LYOPHILIZED, FOR SOLUTION INTRAVENOUS at 05:26

## 2020-01-03 RX ADMIN — Medication 1 SPRAY(S): at 11:21

## 2020-01-03 RX ADMIN — OXYCODONE AND ACETAMINOPHEN 1 TABLET(S): 5; 325 TABLET ORAL at 21:03

## 2020-01-03 RX ADMIN — Medication 1 APPLICATION(S): at 05:26

## 2020-01-03 RX ADMIN — ATORVASTATIN CALCIUM 40 MILLIGRAM(S): 80 TABLET, FILM COATED ORAL at 21:03

## 2020-01-03 RX ADMIN — PANTOPRAZOLE SODIUM 40 MILLIGRAM(S): 20 TABLET, DELAYED RELEASE ORAL at 05:26

## 2020-01-03 RX ADMIN — Medication 1 GRAM(S): at 17:29

## 2020-01-03 RX ADMIN — Medication 240 MILLIGRAM(S): at 05:26

## 2020-01-03 RX ADMIN — Medication 1 TABLET(S): at 11:21

## 2020-01-03 RX ADMIN — Medication 100 MILLIGRAM(S): at 05:26

## 2020-01-03 RX ADMIN — Medication 1 SPRAY(S): at 05:27

## 2020-01-03 RX ADMIN — OXYCODONE AND ACETAMINOPHEN 1 TABLET(S): 5; 325 TABLET ORAL at 05:32

## 2020-01-03 RX ADMIN — Medication 3 MILLILITER(S): at 14:32

## 2020-01-03 RX ADMIN — Medication 1 SPRAY(S): at 17:31

## 2020-01-03 RX ADMIN — OXYCODONE AND ACETAMINOPHEN 1 TABLET(S): 5; 325 TABLET ORAL at 06:43

## 2020-01-03 RX ADMIN — POLYETHYLENE GLYCOL 3350 17 GRAM(S): 17 POWDER, FOR SOLUTION ORAL at 11:21

## 2020-01-03 RX ADMIN — OXYCODONE AND ACETAMINOPHEN 1 TABLET(S): 5; 325 TABLET ORAL at 13:52

## 2020-01-03 RX ADMIN — OXYCODONE AND ACETAMINOPHEN 1 TABLET(S): 5; 325 TABLET ORAL at 22:00

## 2020-01-03 RX ADMIN — Medication 100 MILLIGRAM(S): at 13:15

## 2020-01-03 RX ADMIN — PIPERACILLIN AND TAZOBACTAM 25 GRAM(S): 4; .5 INJECTION, POWDER, LYOPHILIZED, FOR SOLUTION INTRAVENOUS at 17:30

## 2020-01-03 RX ADMIN — Medication 3 MILLILITER(S): at 09:36

## 2020-01-03 RX ADMIN — Medication 100 MILLIGRAM(S): at 21:03

## 2020-01-03 RX ADMIN — SENNA PLUS 2 TABLET(S): 8.6 TABLET ORAL at 21:03

## 2020-01-03 RX ADMIN — Medication 3 MILLILITER(S): at 00:30

## 2020-01-03 RX ADMIN — Medication 1 APPLICATION(S): at 13:15

## 2020-01-03 NOTE — PROGRESS NOTE ADULT - PROBLEM SELECTOR PLAN 1
caution with Opioids -    seen and examined, Vascular eval noted, ct abd reviewed, GOC noted and documented, full code -   [facility] rx regimen - BP control - valvular heart disease - hx of CVA    epistaxis - ENT eval noted - nasal spray -   Heme eval noted  Emphysema - CT chest reviewed - poss PNA - Procalcitonin Noted -   on Zosyn - min of 5 days duration and cont NEBS tid for COPD Emphysema  monitor Hgb  monitor vs and HD and Sat  pt is on room air  assist with ADL  out of bed with assist.   poss DC plan to [facility]

## 2020-01-03 NOTE — PROGRESS NOTE ADULT - PROBLEM SELECTOR PLAN 3
-pt with mild BRIDGET now resolved  -encouraged po hydration without improvement, s/p 500mL bolus and 12hr maintenance fluids while resolved BRIDGET.  -monitor BMP, if has further worsening consider abdominal imaging

## 2020-01-03 NOTE — PROGRESS NOTE ADULT - PROBLEM SELECTOR PLAN 6
- suspected aspiration PNA due to epistaxis  - will continue zosyn for now as per pulmonology recs, 5-7 days of Abx (today is day 7)   - pt's CT on admission was concerning for RML PNA and she had a high procalcitonin that was trending down on Abx  -pt has improved  - Pulmonology, Dr. Rene, recs appreciated

## 2020-01-03 NOTE — PROGRESS NOTE ADULT - SUBJECTIVE AND OBJECTIVE BOX
[facility] -- Dahiana Bañuelos, Toney Horner, Truman Falcon Savella  Office # [phone]      Follow Up:    htn  Subjective/Observations:   No events overnight resting comfortably in bed.  No complaints of chest pain, dyspnea, or palpitations reported. No signs of orthopnea or PND.      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  Osteoarthritis  Afib  PVD (peripheral vascular disease)  Neuropathy: (Right lower leg)  H/O cerebral aneurysm repair: brain clips  CVA (cerebral vascular accident): (&quot;Mini-stroke&quot;,1990&#x27;s)  Rheumatoid arthritis  Hyperlipidemia  Hypertension  S/P cataract surgery: (Left eye)  Elective surgery: (&quot;Twisted bowel&quot;, 2014)  Elective surgery: (Exision of cyst on liver, 1985)  S/P ORIF (open reduction internal fixation) fracture: Left hip fx (2012) &amp; R hip fx (2013)  H/O cerebral aneurysm repair: Brain clips (1978(  Renal stone: Cysto stent placement 10/1/2014  PVD (peripheral vascular disease): s/p RLE bypass x 3, most recent 3/2012 Right external iliac to PT bypass w/ PTFE (2012)  S/P FRED (total abdominal hysterectomy): (1987, Hx of &quot;ovarian cancer?&quot;)      MEDICATIONS  (STANDING):  albuterol/ipratropium for Nebulization 3 milliLiter(s) Nebulizer every 8 hours  aspirin enteric coated 81 milliGRAM(s) Oral daily  atorvastatin 40 milliGRAM(s) Oral at bedtime  BACItracin   Ointment 1 Application(s) Topical three times a day  diltiazem    milliGRAM(s) Oral daily  hydrALAZINE 100 milliGRAM(s) Oral every 8 hours  metoprolol succinate  milliGRAM(s) Oral daily  multivitamin 1 Tablet(s) Oral daily  pantoprazole    Tablet 40 milliGRAM(s) Oral two times a day  piperacillin/tazobactam IVPB.. 3.375 Gram(s) IV Intermittent every 12 hours  polyethylene glycol 3350 17 Gram(s) Oral daily  senna 2 Tablet(s) Oral at bedtime  sodium chloride 0.65% Nasal 1 Spray(s) Both Nostrils four times a day  sodium chloride 0.9%. 1000 milliLiter(s) (75 mL/Hr) IV Continuous <Continuous>  sucralfate 1 Gram(s) Oral two times a day    MEDICATIONS  (PRN):  acetaminophen   Tablet .. 650 milliGRAM(s) Oral every 6 hours PRN Mild Pain (1 - 3)  aluminum hydroxide/magnesium hydroxide/simethicone Suspension 30 milliLiter(s) Oral every 6 hours PRN Dyspepsia  lidocaine   Patch 1 Patch Transdermal daily PRN back pain  melatonin 5 milliGRAM(s) Oral at bedtime PRN Insomnia  oxycodone    5 mG/acetaminophen 325 mG 1 Tablet(s) Oral every 8 hours PRN Severe Pain (7 - 10)  trimethobenzamide 300 milliGRAM(s) Oral three times a day PRN Nausea and/or Vomiting      Allergies    No Known Allergies    Intolerances        Vital Signs Last 24 Hrs  T(C): 36.8 (03 Jan 2020 07:17), Max: 36.8 (02 Jan 2020 20:01)  T(F): 98.2 (03 Jan 2020 07:17), Max: 98.2 (02 Jan 2020 20:01)  HR: 76 (03 Jan 2020 09:41) (65 - 90)  BP: 154/75 (03 Jan 2020 07:17) (115/69 - 154/75)  BP(mean): --  RR: 18 (03 Jan 2020 07:17) (18 - 18)  SpO2: 96% (03 Jan 2020 09:41) (96% - 98%)    I&O's Summary    02 Jan 2020 07:01  -  03 Jan 2020 07:00  --------------------------------------------------------  IN: 1150 mL / OUT: 0 mL / NET: 1150 mL    03 Jan 2020 07:01  -  03 Jan 2020 09:47  --------------------------------------------------------  IN: 120 mL / OUT: 0 mL / NET: 120 mL          PHYSICAL EXAM:  TELE: not on tele  Constitutional: NAD, awake and alert, frail appearing   HEENT: Moist Mucous Membranes, Anicteric  Pulmonary: Non-labored, breath sounds are clear bilaterally, No wheezing, crackles or rhonchi  Cardiovascular: Regular, S1 and S2 nl, No murmurs, rubs, gallops or clicks  Gastrointestinal: Bowel Sounds present, soft, nontender.   Lymph: No lymphadenopathy. No peripheral edema.  Skin: No visible rashes or ulcers.  Psych:  Mood & affect appropriate    LABS: All Labs Reviewed:                        9.5    7.48  )-----------( 196      ( 03 Jan 2020 06:46 )             31.3                         10.2   7.97  )-----------( 197      ( 02 Jan 2020 06:13 )             32.9                         9.9    7.90  )-----------( 189      ( 01 Jan 2020 06:56 )             32.1     03 Jan 2020 06:46    144    |  113    |  34     ----------------------------<  98     4.0     |  24     |  1.30   02 Jan 2020 06:13    142    |  110    |  36     ----------------------------<  94     3.6     |  23     |  1.60   01 Jan 2020 06:56    142    |  110    |  34     ----------------------------<  99     3.7     |  24     |  1.60     Ca    8.2        03 Jan 2020 06:46  Ca    8.4        02 Jan 2020 06:13  Ca    8.4        01 Jan 2020 06:56  Phos  2.4       03 Jan 2020 06:46  Phos  2.8       02 Jan 2020 06:13  Phos  2.4       01 Jan 2020 06:56  Mg     2.0       03 Jan 2020 06:46  Mg     2.0       02 Jan 2020 06:13  Mg     2.1       01 Jan 2020 06:56      PT/INR - ( 03 Jan 2020 06:46 )   PT: 22.7 sec;   INR: 1.97 ratio                  ECG:    < from: 12 Lead ECG (12.27.19 @ 16:04) >    Ventricular Rate 87 BPM    Atrial Rate 87 BPM    P-R Interval 166 ms    QRS Duration 98 ms    Q-T Interval 410 ms    QTC Calculation(Bezet) 493 ms    P Axis 45 degrees    R Axis -34 degrees    T Axis 2 degrees    Diagnosis Line Normal sinus rhythm  Left axis deviation  Minimal voltage criteria for LVH, may be normal variant    < end of copied text >    Echo:    < from: TTE Echo Doppler w/o Cont (06.10.19 @ 14:06) >    Conclusion:   Technically difficult study  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 Cannot rule out segmental dysfunction  Left atrial enlargement  Sclerotic trileaflet aortic valve. Mild AI  Thickened mitral valve leaflets with moderate MR  Mild TR  Small right-sided pleural effusion          < end of copied text >    Radiology:      < from: Xray Chest 1 View- PORTABLE-Urgent (12.28.19 @ 10:16) >    LUNGS/PLEURA: Unchanged airspace disease in the right midlung. No pleural effusion or pneumothorax.  MEDIASTINUM: Ectatic descending thoracic aorta.  OTHER: None.    IMPRESSION:     Unchanged airspace disease in the right midlung, possibly infection or pulmonary edema.    < end of copied text >          Cardiology [facility] -- Dahiana Bañuelos, Toney Horner, Truman Falcon Savella  Office # [phone]      Follow Up:    htn  Subjective/Observations:   No events overnight resting comfortably in bed.  No complaints of chest pain, dyspnea, or palpitations reported. No signs of orthopnea or PND.      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  Osteoarthritis  Afib  PVD (peripheral vascular disease)  Neuropathy: (Right lower leg)  H/O cerebral aneurysm repair: brain clips  CVA (cerebral vascular accident): (&quot;Mini-stroke&quot;,1990&#x27;s)  Rheumatoid arthritis  Hyperlipidemia  Hypertension  S/P cataract surgery: (Left eye)  Elective surgery: (&quot;Twisted bowel&quot;, 2014)  Elective surgery: (Exision of cyst on liver, 1985)  S/P ORIF (open reduction internal fixation) fracture: Left hip fx (2012) &amp; R hip fx (2013)  H/O cerebral aneurysm repair: Brain clips (1978(  Renal stone: Cysto stent placement 10/1/2014  PVD (peripheral vascular disease): s/p RLE bypass x 3, most recent 3/2012 Right external iliac to PT bypass w/ PTFE (2012)  S/P FRED (total abdominal hysterectomy): (1987, Hx of &quot;ovarian cancer?&quot;)      MEDICATIONS  (STANDING):  albuterol/ipratropium for Nebulization 3 milliLiter(s) Nebulizer every 8 hours  aspirin enteric coated 81 milliGRAM(s) Oral daily  atorvastatin 40 milliGRAM(s) Oral at bedtime  BACItracin   Ointment 1 Application(s) Topical three times a day  diltiazem    milliGRAM(s) Oral daily  hydrALAZINE 100 milliGRAM(s) Oral every 8 hours  metoprolol succinate  milliGRAM(s) Oral daily  multivitamin 1 Tablet(s) Oral daily  pantoprazole    Tablet 40 milliGRAM(s) Oral two times a day  piperacillin/tazobactam IVPB.. 3.375 Gram(s) IV Intermittent every 12 hours  polyethylene glycol 3350 17 Gram(s) Oral daily  senna 2 Tablet(s) Oral at bedtime  sodium chloride 0.65% Nasal 1 Spray(s) Both Nostrils four times a day  sodium chloride 0.9%. 1000 milliLiter(s) (75 mL/Hr) IV Continuous <Continuous>  sucralfate 1 Gram(s) Oral two times a day    MEDICATIONS  (PRN):  acetaminophen   Tablet .. 650 milliGRAM(s) Oral every 6 hours PRN Mild Pain (1 - 3)  aluminum hydroxide/magnesium hydroxide/simethicone Suspension 30 milliLiter(s) Oral every 6 hours PRN Dyspepsia  lidocaine   Patch 1 Patch Transdermal daily PRN back pain  melatonin 5 milliGRAM(s) Oral at bedtime PRN Insomnia  oxycodone    5 mG/acetaminophen 325 mG 1 Tablet(s) Oral every 8 hours PRN Severe Pain (7 - 10)  trimethobenzamide 300 milliGRAM(s) Oral three times a day PRN Nausea and/or Vomiting      Allergies    No Known Allergies    Intolerances        Vital Signs Last 24 Hrs  T(C): 36.8 (03 Jan 2020 07:17), Max: 36.8 (02 Jan 2020 20:01)  T(F): 98.2 (03 Jan 2020 07:17), Max: 98.2 (02 Jan 2020 20:01)  HR: 76 (03 Jan 2020 09:41) (65 - 90)  BP: 154/75 (03 Jan 2020 07:17) (115/69 - 154/75)  BP(mean): --  RR: 18 (03 Jan 2020 07:17) (18 - 18)  SpO2: 96% (03 Jan 2020 09:41) (96% - 98%)    I&O's Summary    02 Jan 2020 07:01  -  03 Jan 2020 07:00  --------------------------------------------------------  IN: 1150 mL / OUT: 0 mL / NET: 1150 mL    03 Jan 2020 07:01  -  03 Jan 2020 09:47  --------------------------------------------------------  IN: 120 mL / OUT: 0 mL / NET: 120 mL          PHYSICAL EXAM:  TELE: not on tele  Constitutional: NAD, awake and alert, frail appearing   HEENT: Moist Mucous Membranes, Anicteric  Pulmonary: Decreased breath sounds b/l. No rales, crackles or wheeze appreciated.   Cardiovascular: Regular, S1 and S2 nl, 1/6 SM  Gastrointestinal: Bowel Sounds present, soft, nontender.   Lymph: No lymphadenopathy. No peripheral edema.  Skin: No visible rashes or ulcers.  Psych:  Mood & affect appropriate    LABS: All Labs Reviewed:                        9.5    7.48  )-----------( 196      ( 03 Jan 2020 06:46 )             31.3                         10.2   7.97  )-----------( 197      ( 02 Jan 2020 06:13 )             32.9                         9.9    7.90  )-----------( 189      ( 01 Jan 2020 06:56 )             32.1     03 Jan 2020 06:46    144    |  113    |  34     ----------------------------<  98     4.0     |  24     |  1.30   02 Jan 2020 06:13    142    |  110    |  36     ----------------------------<  94     3.6     |  23     |  1.60   01 Jan 2020 06:56    142    |  110    |  34     ----------------------------<  99     3.7     |  24     |  1.60     Ca    8.2        03 Jan 2020 06:46  Ca    8.4        02 Jan 2020 06:13  Ca    8.4        01 Jan 2020 06:56  Phos  2.4       03 Jan 2020 06:46  Phos  2.8       02 Jan 2020 06:13  Phos  2.4       01 Jan 2020 06:56  Mg     2.0       03 Jan 2020 06:46  Mg     2.0       02 Jan 2020 06:13  Mg     2.1       01 Jan 2020 06:56      PT/INR - ( 03 Jan 2020 06:46 )   PT: 22.7 sec;   INR: 1.97 ratio                  ECG:    < from: 12 Lead ECG (12.27.19 @ 16:04) >    Ventricular Rate 87 BPM    Atrial Rate 87 BPM    P-R Interval 166 ms    QRS Duration 98 ms    Q-T Interval 410 ms    QTC Calculation(Bezet) 493 ms    P Axis 45 degrees    R Axis -34 degrees    T Axis 2 degrees    Diagnosis Line Normal sinus rhythm  Left axis deviation  Minimal voltage criteria for LVH, may be normal variant    < end of copied text >    Echo:    < from: TTE Echo Doppler w/o Cont (06.10.19 @ 14:06) >    Conclusion:   Technically difficult study  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 Cannot rule out segmental dysfunction  Left atrial enlargement  Sclerotic trileaflet aortic valve. Mild AI  Thickened mitral valve leaflets with moderate MR  Mild TR  Small right-sided pleural effusion          < end of copied text >    Radiology:      < from: Xray Chest 1 View- PORTABLE-Urgent (12.28.19 @ 10:16) >    LUNGS/PLEURA: Unchanged airspace disease in the right midlung. No pleural effusion or pneumothorax.  MEDIASTINUM: Ectatic descending thoracic aorta.  OTHER: None.    IMPRESSION:     Unchanged airspace disease in the right midlung, possibly infection or pulmonary edema.    < end of copied text >

## 2020-01-03 NOTE — PROGRESS NOTE ADULT - PROBLEM SELECTOR PROBLEM 8
Essential hypertension
Need for prophylactic measure
Essential hypertension

## 2020-01-03 NOTE — PROGRESS NOTE ADULT - SUBJECTIVE AND OBJECTIVE BOX
INTERVAL HPI/OVERNIGHT EVENTS:  pt seen and examined  offers no gi complaints  tolerating po  had bm    MEDICATIONS  (STANDING):  albuterol/ipratropium for Nebulization 3 milliLiter(s) Nebulizer every 8 hours  aspirin enteric coated 81 milliGRAM(s) Oral daily  atorvastatin 40 milliGRAM(s) Oral at bedtime  BACItracin   Ointment 1 Application(s) Topical three times a day  diltiazem    milliGRAM(s) Oral daily  hydrALAZINE 100 milliGRAM(s) Oral every 8 hours  metoprolol succinate  milliGRAM(s) Oral daily  multivitamin 1 Tablet(s) Oral daily  pantoprazole    Tablet 40 milliGRAM(s) Oral two times a day  piperacillin/tazobactam IVPB.. 3.375 Gram(s) IV Intermittent every 12 hours  polyethylene glycol 3350 17 Gram(s) Oral daily  senna 2 Tablet(s) Oral at bedtime  sodium chloride 0.65% Nasal 1 Spray(s) Both Nostrils four times a day  sodium chloride 0.9%. 1000 milliLiter(s) (75 mL/Hr) IV Continuous <Continuous>  sucralfate 1 Gram(s) Oral two times a day    MEDICATIONS  (PRN):  acetaminophen   Tablet .. 650 milliGRAM(s) Oral every 6 hours PRN Mild Pain (1 - 3)  aluminum hydroxide/magnesium hydroxide/simethicone Suspension 30 milliLiter(s) Oral every 6 hours PRN Dyspepsia  lidocaine   Patch 1 Patch Transdermal daily PRN back pain  melatonin 5 milliGRAM(s) Oral at bedtime PRN Insomnia  oxycodone    5 mG/acetaminophen 325 mG 1 Tablet(s) Oral every 8 hours PRN Severe Pain (7 - 10)  trimethobenzamide 300 milliGRAM(s) Oral three times a day PRN Nausea and/or Vomiting      Allergies    No Known Allergies    Intolerances        Review of Systems:    General:  No wt loss, fevers, chills, night sweats, fatigue   Eyes:  Good vision, no reported pain  ENT:  No sore throat, pain, runny nose, dysphagia  CV:  No pain, palpitations, hypo/hypertension  Resp:  No dyspnea, cough, tachypnea, wheezing  GI:  No pain, No nausea, No vomiting, No diarrhea, No constipation, No weight loss, No fever, No pruritis, No rectal bleeding, No melena, No dysphagia  :  No pain, bleeding, incontinence, nocturia  Muscle:  No pain, weakness  Neuro:  No weakness, tingling, memory problems  Psych:  No fatigue, insomnia, mood problems, depression  Endocrine:  No polyuria, polydypsia, cold/heat intolerance  Heme:  No petechiae, ecchymosis, easy bruisability  Skin:  No rash, tattoos, scars, edema      Vital Signs Last 24 Hrs  T(C): 36.8 (03 Jan 2020 07:17), Max: 36.8 (02 Jan 2020 20:01)  T(F): 98.2 (03 Jan 2020 07:17), Max: 98.2 (02 Jan 2020 20:01)  HR: 74 (03 Jan 2020 07:17) (65 - 90)  BP: 154/75 (03 Jan 2020 07:17) (115/69 - 154/75)  BP(mean): --  RR: 18 (03 Jan 2020 07:17) (18 - 18)  SpO2: 98% (03 Jan 2020 07:17) (96% - 98%)    PHYSICAL EXAM:      General:  lying in bed  HEENT:  NC/AT  Abdomen:  Soft, nt nd  Extremities:  no  edema  Neuro/Psych:  awake alert responds appropriately      LABS:                        9.5    7.48  )-----------( 196      ( 03 Jan 2020 06:46 )             31.3     01-03    144  |  113<H>  |  34<H>  ----------------------------<  98  4.0   |  24  |  1.30    Ca    8.2<L>      03 Jan 2020 06:46  Phos  2.4     01-03  Mg     2.0     01-03      PT/INR - ( 03 Jan 2020 06:46 )   PT: 22.7 sec;   INR: 1.97 ratio               RADIOLOGY & ADDITIONAL TESTS:

## 2020-01-03 NOTE — PROGRESS NOTE ADULT - PROBLEM SELECTOR PLAN 9
-Continue statin  -DASH/TLC diet
Chronic, stable  -Continue statin  -DASH/TLC diet

## 2020-01-03 NOTE — PROGRESS NOTE ADULT - ASSESSMENT
84/F w/ PMHx of afib (coumadin), CVA and hx of aneurysm repair, HTN, HLD, mitral valve prolapse, PVD s/p stents x 3 (RLE), rheumatoid arthritis, cataracts of the L eye BIBEMS to ED for evaluation of epistaxis found with elevated bp in setting of back pain     Uncontrolled HTN/Hypertensive Emergency  -Initially with uncontrolled htn in setting of 10/10 back pain now improved  - continue with Cardizem, metoprolol and Hydralazine   - No evidence of active ischemia or volume overload    Atrial fibrillation  -  controlled per flowsheet  -CW CCB, BB  - Dose coumadin for goal INR 2-3 , INR 1.97 today   -monitor closely for bleeding     Hypokalemia  -resolved     Hx of CVA/PVD  s/p stent  - Continue aspirin 81 QD  - Continue statin drug    vascular  -Seen by vascular for AAA- for yearly CT    Anemia  -Seen by Hem onc- monitor as per primary team   -Sp 1 unit PRBC no further epistaxis noted     Kady  now resovled    stable from CV standpoint awaiting discharge     Renita Lira FNP-C  Cardiology NP  [id number] [phone] 84/F w/ PMHx of afib (coumadin), CVA and hx of aneurysm repair, HTN, HLD, mitral valve prolapse, PVD s/p stents x 3 (RLE), rheumatoid arthritis, cataracts of the L eye BIBEMS to ED for evaluation of epistaxis found with elevated bp in setting of back pain     Uncontrolled HTN/Hypertensive Emergency  - Initially with uncontrolled htn in setting of 10/10 back pain now improved  - continue with Cardizem, metoprolol and Hydralazine   - No evidence of active ischemia or volume overload    Atrial fibrillation  -  controlled per flowsheet  - CW CCB, BB  - Dose coumadin for goal INR 2-3 , INR 1.97 today   -monitor closely for bleeding     Hypokalemia  -resolved     Hx of CVA/PVD  s/p stent  - Continue aspirin 81 QD  - Continue statin drug    vascular  -Seen by vascular for AAA- for yearly CT    Anemia  -Seen by Hem onc- monitor as per primary team   -Sp 1 unit PRBC no further epistaxis noted     Kady  now resolved    stable from CV standpoint awaiting discharge     Renita Lira FNP-C  Cardiology NP  [id number] [phone]

## 2020-01-03 NOTE — PROGRESS NOTE ADULT - NSHPATTENDINGPLANDISCUSS_GEN_ALL_CORE
RN Rivka, SHAWN Lofton, PGY 1 Dr Causey
daughter at bedside, RADHA Guerrero
pt, pt's family, consultants, nursing
pt, RN

## 2020-01-03 NOTE — PROGRESS NOTE ADULT - ASSESSMENT
85yo F w/ PMHx of afib (coumadin), CVA and hx of aneurysm repair, HTN, HLD, mitral valve prolapse, PVD s/p stents x 3 (RLE), rheumatoid arthritis, cataracts of the L eye p/w epistaxis, which resolved without intervention, admitted with weakness and acute blood loss anemia s/p 1 unit PRBC, and suspected aspiration PNA.

## 2020-01-03 NOTE — PROGRESS NOTE ADULT - SUBJECTIVE AND OBJECTIVE BOX
Patient is a 84y old  Female who presents with a chief complaint of Epistaxis (03 Jan 2020 09:47)      FROM ADMISSION H+P:   HPI:  84/F w/ PMHx of afib (coumadin), CVA and hx of aneurysm repair, HTN, HLD, mitral valve prolapse, PVD s/p stents x 3 (RLE), rheumatoid arthritis, cataracts of the L eye BIBEMS to ED for evaluation of epistaxis. Patient noted to have start of nose bleed 12/25 evening when changing to get into bed, denies any nose-blowing or trauma, though she believes she may have sneezed at the onset. She noted "large drops" of blood at onset, at which time she applied an icepack to her nose, and tilted her head back, noted bleeding to slow, and went to bed. When she woke on the AM of 12/26 had blood all over his hands and pillow at which time EMS was activated. Upon waking on 12/26 she noted she felt weak and had to use the walls for support to ambulate. Denies any dizziness, SOB, chest pain, vision changes. Epistaxis noted to resolve without intervention in the ED. Denies any sick contacts or recent travel.     ED Vitals: T 97.5 HR 95 /78 RR 18 SpO2 97% on room air  Labs: WBC 13.15, anemic with HB 8.1, INR 1.79  CXR: Right perihilar airspace disease, atherosclerotic aorta. No effusion.  EKG: Pending at the time of admission  In the ED Patient received tylenol 650mg x 1 (27 Dec 2019 15:33)      ----  INTERVAL HPI/OVERNIGHT EVENTS: Pt seen and evaluated at the bedside. No acute overnight events occurred. Denies any episodes of epistaxis, other bleeding.  States she slept well, enjoying the food.  Feels too weak to go home, agreeing to rehab.  Denies any chest pain, SOB.     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  Osteoarthritis  Afib  PVD (peripheral vascular disease)  Neuropathy: (Right lower leg)  H/O cerebral aneurysm repair: brain clips  CVA (cerebral vascular accident): (&quot;Mini-stroke&quot;,1990&#x27;s)  Rheumatoid arthritis  Hyperlipidemia  Hypertension  S/P cataract surgery: (Left eye)  Elective surgery: (&quot;Twisted bowel&quot;, 2014)  Elective surgery: (Exision of cyst on liver, 1985)  S/P ORIF (open reduction internal fixation) fracture: Left hip fx (2012) &amp; R hip fx (2013)  H/O cerebral aneurysm repair: Brain clips (1978(  Renal stone: Cysto stent placement 10/1/2014  PVD (peripheral vascular disease): s/p RLE bypass x 3, most recent 3/2012 Right external iliac to PT bypass w/ PTFE (2012)  S/P FRDE (total abdominal hysterectomy): (1987, Hx of &quot;ovarian cancer?&quot;)      FAMILY HISTORY:  No pertinent family history in first degree relatives      Allergies    No Known Allergies    Intolerances        ----  REVIEW OF SYSTEMS:  CONSTITUTIONAL: denies fever, chills, fatigue, weakness  HEENT: denies blurred vision, sore throat  SKIN: denies new lesions, rash  CARDIOVASCULAR: denies chest pain, chest pressure, palpitations  RESPIRATORY: denies shortness of breath, sputum production  GASTROINTESTINAL: denies nausea, vomiting, diarrhea, abdominal pain  GENITOURINARY: denies dysuria, discharge  NEUROLOGICAL: denies numbness, headache, focal weakness  MUSCULOSKELETAL: denies new joint pain, muscle aches  HEMATOLOGIC: denies gross bleeding, bruising      ----  PHYSICAL EXAM:  GENERAL: patient appears well, no acute distress, appropriately interactive  EYES: sclera clear, no exudates, esotropia R eye  ENMT: oropharynx clear without erythema, moist mucous membranes  NECK: supple, soft, no thyromegaly noted  LUNGS: good air entry bilaterally, clear to auscultation, symmetric breath sounds, no wheezing or rhonchi appreciated  HEART: soft S1/S2, regular rate and rhythm, no murmurs noted, no noted edema to b/l LE  GASTROINTESTINAL: abdomen is soft, nontender, nondistended, normoactive bowel sounds, no palpable masses  INTEGUMENT: good skin turgor, appropriate for ethnicity, appears well perfused, no jaundice noted  MUSCULOSKELETAL: no clubbing or cyanosis, no obvious deformity  NEUROLOGIC: awake, alert, oriented x3, good muscle tone in 4 extremities, no obvious sensory deficits  PSYCHIATRIC: mood is good, affect is congruent with mood, linear and logical thought process  HEME/LYMPH: no palpable supraclavicular nodules, no obvious ecchymosis     T(C): 36.7 (01-03-20 @ 11:13), Max: 36.8 (01-02-20 @ 20:01)  HR: 64 (01-03-20 @ 11:13) (64 - 90)  BP: 136/75 (01-03-20 @ 11:13) (115/69 - 154/75)  RR: 18 (01-03-20 @ 11:13) (18 - 18)  SpO2: 98% (01-03-20 @ 11:13) (96% - 98%)  Wt(kg): --    ----  I&O's Summary    02 Jan 2020 07:01  -  03 Jan 2020 07:00  --------------------------------------------------------  IN: 1150 mL / OUT: 0 mL / NET: 1150 mL    03 Jan 2020 07:01  -  03 Jan 2020 13:41  --------------------------------------------------------  IN: 120 mL / OUT: 0 mL / NET: 120 mL        LABS:                        9.5    7.48  )-----------( 196      ( 03 Jan 2020 06:46 )             31.3     01-03    144  |  113<H>  |  34<H>  ----------------------------<  98  4.0   |  24  |  1.30    Ca    8.2<L>      03 Jan 2020 06:46  Phos  2.4     01-03  Mg     2.0     01-03      PT/INR - ( 03 Jan 2020 06:46 )   PT: 22.7 sec;   INR: 1.97 ratio             CAPILLARY BLOOD GLUCOSE      POCT Blood Glucose.: 106 mg/dL (02 Jan 2020 16:15)                ----  Personally reviewed:  Vital sign trends: [ x ] yes    [  ] no     [  ] n/a  Laboratory results: [ x ] yes    [  ] no     [  ] n/a  Radiology results: [  ] yes    [  ] no     [ x ] n/a  Culture results: [  ] yes    [  ] no     [x  ] n/a  Consultant recommendations: [ x ] yes    [  ] no     [  ] n/a

## 2020-01-03 NOTE — PROGRESS NOTE ADULT - ASSESSMENT
n/v/epigastric pain, resolved  anemia, multifactorial  epistaxis, resolved      clinically improved  ct scan reviewed, atherosclerotic disease noted in mesenteric vessel  suspect chronic mesenteric ischemia   cont ppi bid, carafate bid, maalox prn, tigan prn  bowel regimen prn  no s/s active gib; monitor cbc, transfuse prn  diet as tolerated  will follow

## 2020-01-03 NOTE — PROGRESS NOTE ADULT - SUBJECTIVE AND OBJECTIVE BOX
Date/Time Patient Seen:  		  Referring MD:   Data Reviewed	       Patient is a 84y old  Female who presents with a chief complaint of Epistaxis (02 Jan 2020 16:04)      Subjective/HPI     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  Osteoarthritis  Afib  PVD (peripheral vascular disease)  Neuropathy: (Right lower leg)  Gout  H/O cerebral aneurysm repair: brain clips  CVA (cerebral vascular accident): (&quot;Mini-stroke&quot;,1990&#x27;s)  Rheumatoid arthritis  Asthma  Hyperlipidemia  Hypertension  S/P cataract surgery: (Left eye)  Elective surgery: (&quot;Twisted bowel&quot;, 2014)  Elective surgery: (Exision of cyst on liver, 1985)  S/P ORIF (open reduction internal fixation) fracture: Left hip fx (2012) &amp; R hip fx (2013)  H/O cerebral aneurysm repair: Brain clips (1978(  Renal stone: Cysto stent placement 10/1/2014  PVD (peripheral vascular disease): s/p RLE bypass x 3, most recent 3/2012 Right external iliac to PT bypass w/ PTFE (2012)  S/P FRED (total abdominal hysterectomy): (1987, Hx of &quot;ovarian cancer?&quot;)        Medication list         MEDICATIONS  (STANDING):  albuterol/ipratropium for Nebulization 3 milliLiter(s) Nebulizer every 8 hours  aspirin enteric coated 81 milliGRAM(s) Oral daily  atorvastatin 40 milliGRAM(s) Oral at bedtime  BACItracin   Ointment 1 Application(s) Topical three times a day  diltiazem    milliGRAM(s) Oral daily  hydrALAZINE 100 milliGRAM(s) Oral every 8 hours  metoprolol succinate  milliGRAM(s) Oral daily  multivitamin 1 Tablet(s) Oral daily  pantoprazole    Tablet 40 milliGRAM(s) Oral two times a day  piperacillin/tazobactam IVPB.. 3.375 Gram(s) IV Intermittent every 12 hours  polyethylene glycol 3350 17 Gram(s) Oral daily  senna 2 Tablet(s) Oral at bedtime  sodium chloride 0.65% Nasal 1 Spray(s) Both Nostrils four times a day  sodium chloride 0.9%. 1000 milliLiter(s) (75 mL/Hr) IV Continuous <Continuous>  sucralfate 1 Gram(s) Oral two times a day    MEDICATIONS  (PRN):  acetaminophen   Tablet .. 650 milliGRAM(s) Oral every 6 hours PRN Mild Pain (1 - 3)  aluminum hydroxide/magnesium hydroxide/simethicone Suspension 30 milliLiter(s) Oral every 6 hours PRN Dyspepsia  lidocaine   Patch 1 Patch Transdermal daily PRN back pain  melatonin 5 milliGRAM(s) Oral at bedtime PRN Insomnia  oxycodone    5 mG/acetaminophen 325 mG 1 Tablet(s) Oral every 8 hours PRN Severe Pain (7 - 10)  trimethobenzamide 300 milliGRAM(s) Oral three times a day PRN Nausea and/or Vomiting         Vitals log        ICU Vital Signs Last 24 Hrs  T(C): 36.6 (03 Jan 2020 04:28), Max: 36.8 (02 Jan 2020 20:01)  T(F): 97.9 (03 Jan 2020 04:28), Max: 98.2 (02 Jan 2020 20:01)  HR: 72 (03 Jan 2020 04:28) (65 - 90)  BP: 149/77 (03 Jan 2020 04:28) (115/69 - 149/77)  BP(mean): --  ABP: --  ABP(mean): --  RR: 18 (03 Jan 2020 04:28) (18 - 18)  SpO2: 97% (03 Jan 2020 04:28) (96% - 99%)           Input and Output:  I&O's Detail    01 Jan 2020 07:01  -  02 Jan 2020 07:00  --------------------------------------------------------  IN:    Oral Fluid: 650 mL    Solution: 200 mL  Total IN: 850 mL    OUT:  Total OUT: 0 mL    Total NET: 850 mL      02 Jan 2020 07:01  -  03 Jan 2020 06:17  --------------------------------------------------------  IN:    Sodium Chloride 0.9% IV Bolus: 500 mL    sodium chloride 0.9%.: 600 mL    Solution: 50 mL  Total IN: 1150 mL    OUT:  Total OUT: 0 mL    Total NET: 1150 mL          Lab Data                        10.2   7.97  )-----------( 197      ( 02 Jan 2020 06:13 )             32.9     01-02    142  |  110<H>  |  36<H>  ----------------------------<  94  3.6   |  23  |  1.60<H>    Ca    8.4<L>      02 Jan 2020 06:13  Phos  2.8     01-02  Mg     2.0     01-02              Review of Systems	      Objective     Physical Examination    head at  heart s1s2  lung dec BS  abd soft      Pertinent Lab findings & Imaging      Fransico:  NO   Adequate UO     I&O's Detail    01 Jan 2020 07:01  -  02 Jan 2020 07:00  --------------------------------------------------------  IN:    Oral Fluid: 650 mL    Solution: 200 mL  Total IN: 850 mL    OUT:  Total OUT: 0 mL    Total NET: 850 mL      02 Jan 2020 07:01  -  03 Jan 2020 06:17  --------------------------------------------------------  IN:    Sodium Chloride 0.9% IV Bolus: 500 mL    sodium chloride 0.9%.: 600 mL    Solution: 50 mL  Total IN: 1150 mL    OUT:  Total OUT: 0 mL    Total NET: 1150 mL               Discussed with:     Cultures:	        Radiology

## 2020-01-03 NOTE — PROGRESS NOTE ADULT - PROBLEM SELECTOR PLAN 1
pt. denies any further episodes of epistaxis  - Hgb stable  - continue to use nasal saline humidification as per ENT  - will continue coumadin as no active bleed at this time and risks vs benefits for afib, daughter and patient agree  - will dose 5mg tonight, as well, goal INR of 2-3 and has been slowly rising (at home pt takes only 2.5mg six days per weeks and 5mg once per week, but did come in subtherapeutic.)

## 2020-01-03 NOTE — PROVIDER CONTACT NOTE (OTHER) - ACTION/TREATMENT ORDERED:
Dr. Villeda notified no new orders at this time. Will continue to monitor pt.
 states he is putting in an order for Zofran
MD notified. Provider to RN order obtained. Percocet given early as ordered. Cont to monitor
Per , Simethicone ordered as one time dose.

## 2020-01-03 NOTE — PROGRESS NOTE ADULT - PROBLEM SELECTOR PROBLEM 2
Afib
Afib
Anemia due to blood loss

## 2020-01-03 NOTE — CHART NOTE - NSCHARTNOTEFT_GEN_A_CORE
Assessment:  Pt seen for nutrition follow up.  Chart reviewed, hospital course noted.    Pt receiving respiratory tx- mask on face but able to speak with pt.  Reports she's been eating well, "I love the food here".  BM noted 1/2.     Factors impacting intake: [x] none [ ] nausea  [ ] vomiting [ ] diarrhea [ ] constipation  [ ]chewing problems [ ] swallowing issues  [ ] other:     Diet Presciption: Diet, DASH/TLC:   Sodium & Cholesterol Restricted (12-27-19 @ 16:40)    Intake: good (has improved)    Current Weight: 1/3 99.2#    Pertinent Medications: MEDICATIONS  (STANDING):  albuterol/ipratropium for Nebulization 3 milliLiter(s) Nebulizer every 8 hours  aspirin enteric coated 81 milliGRAM(s) Oral daily  atorvastatin 40 milliGRAM(s) Oral at bedtime  BACItracin   Ointment 1 Application(s) Topical three times a day  diltiazem    milliGRAM(s) Oral daily  hydrALAZINE 100 milliGRAM(s) Oral every 8 hours  metoprolol succinate  milliGRAM(s) Oral daily  multivitamin 1 Tablet(s) Oral daily  pantoprazole    Tablet 40 milliGRAM(s) Oral two times a day  piperacillin/tazobactam IVPB.. 3.375 Gram(s) IV Intermittent every 12 hours  polyethylene glycol 3350 17 Gram(s) Oral daily  senna 2 Tablet(s) Oral at bedtime  sodium chloride 0.65% Nasal 1 Spray(s) Both Nostrils four times a day  sodium chloride 0.9%. 1000 milliLiter(s) (75 mL/Hr) IV Continuous <Continuous>  sucralfate 1 Gram(s) Oral two times a day  warfarin 5 milliGRAM(s) Oral once    MEDICATIONS  (PRN):  acetaminophen   Tablet .. 650 milliGRAM(s) Oral every 6 hours PRN Mild Pain (1 - 3)  aluminum hydroxide/magnesium hydroxide/simethicone Suspension 30 milliLiter(s) Oral every 6 hours PRN Dyspepsia  lidocaine   Patch 1 Patch Transdermal daily PRN back pain  melatonin 5 milliGRAM(s) Oral at bedtime PRN Insomnia  oxycodone    5 mG/acetaminophen 325 mG 1 Tablet(s) Oral every 8 hours PRN Severe Pain (7 - 10)  trimethobenzamide 300 milliGRAM(s) Oral three times a day PRN Nausea and/or Vomiting    Pertinent Labs: 01-03 Na144 mmol/L Glu 98 mg/dL K+ 4.0 mmol/L Cr  1.30 mg/dL BUN 34 mg/dL<H> 01-03 Phos 2.4 mg/dL<L> 12-30 Alb 3.3 g/dL     CAPILLARY BLOOD GLUCOSE      POCT Blood Glucose.: 106 mg/dL (02 Jan 2020 16:15)    Skin: no pressure injuries  Edema: none noted    Estimated Needs:   [x] no change since previous assessment  [ ] recalculated:     Previous Nutrition Diagnosis:    [x] Malnutrition     Nutrition Diagnosis is [x] ongoing and addressed with food preferences, supplements.       New Nutrition Diagnosis: [x] not applicable       Interventions:   Recommend  [ ] Change Diet To:  [x] Nutrition Supplement- 8oz Ensure Enlive bid  [ ] Nutrition Support  [x] Other: Encourage po intake, meal completion/supplement intake    Monitoring and Evaluation:   [x] PO intake [ x ] Tolerance to diet prescription [ x ] weights [ x ] labs [ x ] follow up per protocol  [ ] other:

## 2020-01-04 ENCOUNTER — TRANSCRIPTION ENCOUNTER (OUTPATIENT)
Age: 85
End: 2020-01-04

## 2020-01-04 VITALS — DIASTOLIC BLOOD PRESSURE: 82 MMHG | SYSTOLIC BLOOD PRESSURE: 137 MMHG

## 2020-01-04 LAB
ANION GAP SERPL CALC-SCNC: 9 MMOL/L — SIGNIFICANT CHANGE UP (ref 5–17)
BUN SERPL-MCNC: 30 MG/DL — HIGH (ref 7–23)
CALCIUM SERPL-MCNC: 8.1 MG/DL — LOW (ref 8.5–10.1)
CHLORIDE SERPL-SCNC: 110 MMOL/L — HIGH (ref 96–108)
CO2 SERPL-SCNC: 24 MMOL/L — SIGNIFICANT CHANGE UP (ref 22–31)
CREAT SERPL-MCNC: 1.2 MG/DL — SIGNIFICANT CHANGE UP (ref 0.5–1.3)
GLUCOSE SERPL-MCNC: 107 MG/DL — HIGH (ref 70–99)
HCT VFR BLD CALC: 31.6 % — LOW (ref 34.5–45)
HGB BLD-MCNC: 9.9 G/DL — LOW (ref 11.5–15.5)
INR BLD: 2.26 RATIO — HIGH (ref 0.88–1.16)
MAGNESIUM SERPL-MCNC: 2 MG/DL — SIGNIFICANT CHANGE UP (ref 1.6–2.6)
MCHC RBC-ENTMCNC: 27.4 PG — SIGNIFICANT CHANGE UP (ref 27–34)
MCHC RBC-ENTMCNC: 31.3 GM/DL — LOW (ref 32–36)
MCV RBC AUTO: 87.5 FL — SIGNIFICANT CHANGE UP (ref 80–100)
NRBC # BLD: 0 /100 WBCS — SIGNIFICANT CHANGE UP (ref 0–0)
PHOSPHATE SERPL-MCNC: 2.7 MG/DL — SIGNIFICANT CHANGE UP (ref 2.5–4.5)
PLATELET # BLD AUTO: 188 K/UL — SIGNIFICANT CHANGE UP (ref 150–400)
POTASSIUM SERPL-MCNC: 3.8 MMOL/L — SIGNIFICANT CHANGE UP (ref 3.5–5.3)
POTASSIUM SERPL-SCNC: 3.8 MMOL/L — SIGNIFICANT CHANGE UP (ref 3.5–5.3)
PROTHROM AB SERPL-ACNC: 26.4 SEC — HIGH (ref 10–12.9)
RBC # BLD: 3.61 M/UL — LOW (ref 3.8–5.2)
RBC # FLD: 16.5 % — HIGH (ref 10.3–14.5)
SODIUM SERPL-SCNC: 143 MMOL/L — SIGNIFICANT CHANGE UP (ref 135–145)
WBC # BLD: 6.97 K/UL — SIGNIFICANT CHANGE UP (ref 3.8–10.5)
WBC # FLD AUTO: 6.97 K/UL — SIGNIFICANT CHANGE UP (ref 3.8–10.5)

## 2020-01-04 PROCEDURE — 86850 RBC ANTIBODY SCREEN: CPT

## 2020-01-04 PROCEDURE — 82248 BILIRUBIN DIRECT: CPT

## 2020-01-04 PROCEDURE — 82247 BILIRUBIN TOTAL: CPT

## 2020-01-04 PROCEDURE — 94640 AIRWAY INHALATION TREATMENT: CPT

## 2020-01-04 PROCEDURE — 82728 ASSAY OF FERRITIN: CPT

## 2020-01-04 PROCEDURE — 99221 1ST HOSP IP/OBS SF/LOW 40: CPT

## 2020-01-04 PROCEDURE — 99239 HOSP IP/OBS DSCHRG MGMT >30: CPT

## 2020-01-04 PROCEDURE — 85027 COMPLETE CBC AUTOMATED: CPT

## 2020-01-04 PROCEDURE — 85730 THROMBOPLASTIN TIME PARTIAL: CPT

## 2020-01-04 PROCEDURE — 71045 X-RAY EXAM CHEST 1 VIEW: CPT

## 2020-01-04 PROCEDURE — 93005 ELECTROCARDIOGRAM TRACING: CPT

## 2020-01-04 PROCEDURE — 74018 RADEX ABDOMEN 1 VIEW: CPT

## 2020-01-04 PROCEDURE — 83550 IRON BINDING TEST: CPT

## 2020-01-04 PROCEDURE — 83735 ASSAY OF MAGNESIUM: CPT

## 2020-01-04 PROCEDURE — 36415 COLL VENOUS BLD VENIPUNCTURE: CPT

## 2020-01-04 PROCEDURE — 82962 GLUCOSE BLOOD TEST: CPT

## 2020-01-04 PROCEDURE — 86900 BLOOD TYPING SEROLOGIC ABO: CPT

## 2020-01-04 PROCEDURE — 83880 ASSAY OF NATRIURETIC PEPTIDE: CPT

## 2020-01-04 PROCEDURE — 84443 ASSAY THYROID STIM HORMONE: CPT

## 2020-01-04 PROCEDURE — 86901 BLOOD TYPING SEROLOGIC RH(D): CPT

## 2020-01-04 PROCEDURE — 36430 TRANSFUSION BLD/BLD COMPNT: CPT

## 2020-01-04 PROCEDURE — 86923 COMPATIBILITY TEST ELECTRIC: CPT

## 2020-01-04 PROCEDURE — 99231 SBSQ HOSP IP/OBS SF/LOW 25: CPT

## 2020-01-04 PROCEDURE — 85610 PROTHROMBIN TIME: CPT

## 2020-01-04 PROCEDURE — 71275 CT ANGIOGRAPHY CHEST: CPT

## 2020-01-04 PROCEDURE — 94760 N-INVAS EAR/PLS OXIMETRY 1: CPT

## 2020-01-04 PROCEDURE — 70450 CT HEAD/BRAIN W/O DYE: CPT

## 2020-01-04 PROCEDURE — 83540 ASSAY OF IRON: CPT

## 2020-01-04 PROCEDURE — 84145 PROCALCITONIN (PCT): CPT

## 2020-01-04 PROCEDURE — 76706 US ABDL AORTA SCREEN AAA: CPT

## 2020-01-04 PROCEDURE — 97530 THERAPEUTIC ACTIVITIES: CPT

## 2020-01-04 PROCEDURE — 74177 CT ABD & PELVIS W/CONTRAST: CPT

## 2020-01-04 PROCEDURE — 97162 PT EVAL MOD COMPLEX 30 MIN: CPT

## 2020-01-04 PROCEDURE — 83690 ASSAY OF LIPASE: CPT

## 2020-01-04 PROCEDURE — P9016: CPT

## 2020-01-04 PROCEDURE — 97116 GAIT TRAINING THERAPY: CPT

## 2020-01-04 PROCEDURE — 84100 ASSAY OF PHOSPHORUS: CPT

## 2020-01-04 PROCEDURE — 80048 BASIC METABOLIC PNL TOTAL CA: CPT

## 2020-01-04 PROCEDURE — 99285 EMERGENCY DEPT VISIT HI MDM: CPT

## 2020-01-04 PROCEDURE — 80053 COMPREHEN METABOLIC PANEL: CPT

## 2020-01-04 RX ORDER — IPRATROPIUM/ALBUTEROL SULFATE 18-103MCG
3 AEROSOL WITH ADAPTER (GRAM) INHALATION
Qty: 0 | Refills: 0 | DISCHARGE
Start: 2020-01-04

## 2020-01-04 RX ORDER — DILTIAZEM HCL 120 MG
1 CAPSULE, EXT RELEASE 24 HR ORAL
Qty: 0 | Refills: 0 | DISCHARGE
Start: 2020-01-04

## 2020-01-04 RX ORDER — HYDRALAZINE HCL 50 MG
1 TABLET ORAL
Qty: 0 | Refills: 0 | DISCHARGE
Start: 2020-01-04

## 2020-01-04 RX ORDER — HYDRALAZINE HCL 50 MG
1 TABLET ORAL
Qty: 0 | Refills: 0 | DISCHARGE

## 2020-01-04 RX ORDER — POLYETHYLENE GLYCOL 3350 17 G/17G
17 POWDER, FOR SOLUTION ORAL
Qty: 0 | Refills: 0 | DISCHARGE
Start: 2020-01-04

## 2020-01-04 RX ORDER — SUCRALFATE 1 G
1 TABLET ORAL
Qty: 0 | Refills: 0 | DISCHARGE
Start: 2020-01-04

## 2020-01-04 RX ADMIN — Medication 1 SPRAY(S): at 05:55

## 2020-01-04 RX ADMIN — Medication 100 MILLIGRAM(S): at 13:33

## 2020-01-04 RX ADMIN — Medication 1 SPRAY(S): at 11:56

## 2020-01-04 RX ADMIN — Medication 1 TABLET(S): at 11:56

## 2020-01-04 RX ADMIN — POLYETHYLENE GLYCOL 3350 17 GRAM(S): 17 POWDER, FOR SOLUTION ORAL at 11:56

## 2020-01-04 RX ADMIN — PANTOPRAZOLE SODIUM 40 MILLIGRAM(S): 20 TABLET, DELAYED RELEASE ORAL at 05:55

## 2020-01-04 RX ADMIN — Medication 100 MILLIGRAM(S): at 05:55

## 2020-01-04 RX ADMIN — Medication 5 MILLIGRAM(S): at 00:24

## 2020-01-04 RX ADMIN — Medication 240 MILLIGRAM(S): at 05:55

## 2020-01-04 RX ADMIN — Medication 1 APPLICATION(S): at 05:55

## 2020-01-04 RX ADMIN — Medication 1 GRAM(S): at 05:55

## 2020-01-04 RX ADMIN — Medication 81 MILLIGRAM(S): at 11:56

## 2020-01-04 RX ADMIN — Medication 1 APPLICATION(S): at 13:33

## 2020-01-04 RX ADMIN — Medication 3 MILLILITER(S): at 07:30

## 2020-01-04 RX ADMIN — Medication 150 MILLIGRAM(S): at 05:55

## 2020-01-04 NOTE — PROGRESS NOTE ADULT - ASSESSMENT
Isabel Quintero DNP, NP-C  Cardiology   Spectra #[phone] 84/F w/ PMHx of afib (coumadin), CVA and hx of aneurysm repair, HTN, HLD, mitral valve prolapse, PVD s/p stents x 3 (RLE), rheumatoid arthritis, cataracts of the L eye BIBEMS to ED for evaluation of epistaxis found with elevated bp in setting of back pain     Uncontrolled HTN/Hypertensive Emergency  - Initially with uncontrolled HTN, likely in the setting of severe back pain.  BP now improved  - Continue with Cardizem, Metoprolol, and Hydralazine at same doses  - No evidence of active ischemia or volume overload    Atrial fibrillation  - Remains rate-controlled per flow sheet  - Continue CCB, BB  - Dose coumadin for goal INR 2-3 , INR today is therapeutic at 2.26  - Monitor closely for bleeding   - Monitor electrolytes, replete to keep K>4 and Mag>2    Hx of CVA/PVD  s/p stent  - Continue aspirin 81 QD  - Continue statin    AAA  -Seen by vascular for AAA- for yearly CT (stable)    Anemia  - Seen by Hem onc- monitor as per primary team   - Sp 1 unit PRBC no further epistaxis noted   - H/H stable    BRIDGET  - Resolved    Stable from CV standpoint awaiting discharge   To follow with Dr. Laughlin as an outpatinet    Isabel Quintero DNP, NP-C  Cardiology   Spectra #[phone]

## 2020-01-04 NOTE — PROGRESS NOTE ADULT - SUBJECTIVE AND OBJECTIVE BOX
Date/Time Patient Seen:  		  Referring MD:   Data Reviewed	       Patient is a 84y old  Female who presents with a chief complaint of Epistaxis (03 Jan 2020 13:41)      Subjective/HPI     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  Osteoarthritis  Afib  PVD (peripheral vascular disease)  Neuropathy: (Right lower leg)  Gout  H/O cerebral aneurysm repair: brain clips  CVA (cerebral vascular accident): (&quot;Mini-stroke&quot;,1990&#x27;s)  Rheumatoid arthritis  Asthma  Hyperlipidemia  Hypertension  S/P cataract surgery: (Left eye)  Elective surgery: (&quot;Twisted bowel&quot;, 2014)  Elective surgery: (Exision of cyst on liver, 1985)  S/P ORIF (open reduction internal fixation) fracture: Left hip fx (2012) &amp; R hip fx (2013)  H/O cerebral aneurysm repair: Brain clips (1978(  Renal stone: Cysto stent placement 10/1/2014  PVD (peripheral vascular disease): s/p RLE bypass x 3, most recent 3/2012 Right external iliac to PT bypass w/ PTFE (2012)  S/P FRED (total abdominal hysterectomy): (1987, Hx of &quot;ovarian cancer?&quot;)        Medication list         MEDICATIONS  (STANDING):  albuterol/ipratropium for Nebulization 3 milliLiter(s) Nebulizer every 8 hours  aspirin enteric coated 81 milliGRAM(s) Oral daily  atorvastatin 40 milliGRAM(s) Oral at bedtime  BACItracin   Ointment 1 Application(s) Topical three times a day  diltiazem    milliGRAM(s) Oral daily  hydrALAZINE 100 milliGRAM(s) Oral every 8 hours  metoprolol succinate  milliGRAM(s) Oral daily  multivitamin 1 Tablet(s) Oral daily  pantoprazole    Tablet 40 milliGRAM(s) Oral two times a day  polyethylene glycol 3350 17 Gram(s) Oral daily  senna 2 Tablet(s) Oral at bedtime  sodium chloride 0.65% Nasal 1 Spray(s) Both Nostrils four times a day  sodium chloride 0.9%. 1000 milliLiter(s) (75 mL/Hr) IV Continuous <Continuous>  sucralfate 1 Gram(s) Oral two times a day    MEDICATIONS  (PRN):  acetaminophen   Tablet .. 650 milliGRAM(s) Oral every 6 hours PRN Mild Pain (1 - 3)  aluminum hydroxide/magnesium hydroxide/simethicone Suspension 30 milliLiter(s) Oral every 6 hours PRN Dyspepsia  lidocaine   Patch 1 Patch Transdermal daily PRN back pain  melatonin 5 milliGRAM(s) Oral at bedtime PRN Insomnia  oxycodone    5 mG/acetaminophen 325 mG 1 Tablet(s) Oral every 8 hours PRN Severe Pain (7 - 10)  trimethobenzamide 300 milliGRAM(s) Oral three times a day PRN Nausea and/or Vomiting         Vitals log        ICU Vital Signs Last 24 Hrs  T(C): 36.4 (04 Jan 2020 07:33), Max: 36.7 (03 Jan 2020 11:13)  T(F): 97.5 (04 Jan 2020 07:33), Max: 98 (03 Jan 2020 11:13)  HR: 70 (04 Jan 2020 07:33) (64 - 79)  BP: 159/74 (04 Jan 2020 07:33) (116/72 - 159/74)  BP(mean): --  ABP: --  ABP(mean): --  RR: 18 (04 Jan 2020 07:33) (18 - 18)  SpO2: 96% (04 Jan 2020 07:33) (95% - 100%)           Input and Output:  I&O's Detail    03 Jan 2020 07:01  -  04 Jan 2020 07:00  --------------------------------------------------------  IN:    Oral Fluid: 620 mL    Solution: 100 mL  Total IN: 720 mL    OUT:  Total OUT: 0 mL    Total NET: 720 mL          Lab Data                        9.9    6.97  )-----------( 188      ( 04 Jan 2020 07:04 )             31.6     01-03    144  |  113<H>  |  34<H>  ----------------------------<  98  4.0   |  24  |  1.30    Ca    8.2<L>      03 Jan 2020 06:46  Phos  2.4     01-03  Mg     2.0     01-03              Review of Systems	      Objective     Physical Examination    head at  heart s1s2  lung dec BS  abd soft      Pertinent Lab findings & Imaging      Fransico:  NO   Adequate UO     I&O's Detail    03 Jan 2020 07:01  -  04 Jan 2020 07:00  --------------------------------------------------------  IN:    Oral Fluid: 620 mL    Solution: 100 mL  Total IN: 720 mL    OUT:  Total OUT: 0 mL    Total NET: 720 mL               Discussed with:     Cultures:	        Radiology

## 2020-01-04 NOTE — DISCHARGE NOTE NURSING/CASE MANAGEMENT/SOCIAL WORK - NSDCPEPTCOWADIET_GEN_ALL_CORE
Ramírez LAURA Keep your intake of vitamin K regular. The highest amount of vitamin K is found in green and leafy vegetables like broccoli, lettuces, cabbage, and spinach. You can eat these foods but keep the portion size the same. Changes in the amount you eat can affect your PT/INR blood test. Contact your doctor before making any major changes in your diet. Limit your alcohol intake.

## 2020-01-04 NOTE — PROGRESS NOTE ADULT - PROBLEM SELECTOR PLAN 1
caution with Opioids -    seen and examined, Vascular eval noted, ct abd reviewed, GOC noted and documented, full code -   [facility] rx regimen - BP control - valvular heart disease - hx of CVA    epistaxis - ENT eval noted - nasal spray -   Heme eval noted  Emphysema - CT chest reviewed - poss PNA - Procalcitonin Noted -   Zosyn - min of 5 days (completed) duration and cont NEBS tid for COPD Emphysema  monitor Hgb  monitor vs and HD and Sat  pt is on room air  assist with ADL  out of bed with assist.   poss DC plan to [facility].

## 2020-01-04 NOTE — PROGRESS NOTE ADULT - SUBJECTIVE AND OBJECTIVE BOX
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  Osteoarthritis  Afib  PVD (peripheral vascular disease)  Neuropathy: (Right lower leg)  H/O cerebral aneurysm repair: brain clips  CVA (cerebral vascular accident): (&quot;Mini-stroke&quot;,1990&#x27;s)  Rheumatoid arthritis  Hyperlipidemia  Hypertension  S/P cataract surgery: (Left eye)  Elective surgery: (&quot;Twisted bowel&quot;, 2014)  Elective surgery: (Exision of cyst on liver, 1985)  S/P ORIF (open reduction internal fixation) fracture: Left hip fx (2012) &amp; R hip fx (2013)  H/O cerebral aneurysm repair: Brain clips (1978(  Renal stone: Cysto stent placement 10/1/2014  PVD (peripheral vascular disease): s/p RLE bypass x 3, most recent 3/2012 Right external iliac to PT bypass w/ PTFE (2012)  S/P FRED (total abdominal hysterectomy): (1987, Hx of &quot;ovarian cancer?&quot;)    MEDICATIONS  (STANDING):  albuterol/ipratropium for Nebulization 3 milliLiter(s) Nebulizer every 8 hours  aspirin enteric coated 81 milliGRAM(s) Oral daily  atorvastatin 40 milliGRAM(s) Oral at bedtime  BACItracin   Ointment 1 Application(s) Topical three times a day  diltiazem    milliGRAM(s) Oral daily  hydrALAZINE 100 milliGRAM(s) Oral every 8 hours  metoprolol succinate  milliGRAM(s) Oral daily  multivitamin 1 Tablet(s) Oral daily  pantoprazole    Tablet 40 milliGRAM(s) Oral two times a day  polyethylene glycol 3350 17 Gram(s) Oral daily  senna 2 Tablet(s) Oral at bedtime  sodium chloride 0.65% Nasal 1 Spray(s) Both Nostrils four times a day  sodium chloride 0.9%. 1000 milliLiter(s) (75 mL/Hr) IV Continuous <Continuous>  sucralfate 1 Gram(s) Oral two times a day    MEDICATIONS  (PRN):  acetaminophen   Tablet .. 650 milliGRAM(s) Oral every 6 hours PRN Mild Pain (1 - 3)  aluminum hydroxide/magnesium hydroxide/simethicone Suspension 30 milliLiter(s) Oral every 6 hours PRN Dyspepsia  lidocaine   Patch 1 Patch Transdermal daily PRN back pain  melatonin 5 milliGRAM(s) Oral at bedtime PRN Insomnia  oxycodone    5 mG/acetaminophen 325 mG 1 Tablet(s) Oral every 8 hours PRN Severe Pain (7 - 10)  trimethobenzamide 300 milliGRAM(s) Oral three times a day PRN Nausea and/or Vomiting    Allergies    No Known Allergies    Intolerances      Vital Signs Last 24 Hrs  T(C): 36.4 (04 Jan 2020 07:33), Max: 36.7 (03 Jan 2020 11:13)  T(F): 97.5 (04 Jan 2020 07:33), Max: 98 (03 Jan 2020 11:13)  HR: 70 (04 Jan 2020 07:33) (64 - 79)  BP: 159/74 (04 Jan 2020 07:33) (116/72 - 159/74)  BP(mean): --  RR: 18 (04 Jan 2020 07:33) (18 - 18)  SpO2: 96% (04 Jan 2020 07:33) (95% - 100%)  I&O's Summary    03 Jan 2020 07:01  -  04 Jan 2020 07:00  --------------------------------------------------------  IN: 720 mL / OUT: 0 mL / NET: 720 mL        PHYSICAL EXAM:  TELE:   Constitutional: NAD, awake and alert, well-developed  HEENT: Moist Mucous Membranes, Anicteric  Pulmonary: Non-labored, breath sounds are clear bilaterally, No wheezing, rales or rhonchi  Cardiovascular: Regular, S1 and S2, No murmurs, rubs, gallops or clicks  Gastrointestinal: Bowel Sounds present, soft, nontender.   Lymph: No peripheral edema. No lymphadenopathy.  Skin: No visible rashes or ulcers.  Psych:  Mood & affect appropriate  LABS: All Labs Reviewed:                        9.9    6.97  )-----------( 188      ( 04 Jan 2020 07:04 )             31.6                         9.5    7.48  )-----------( 196      ( 03 Jan 2020 06:46 )             31.3                         10.2   7.97  )-----------( 197      ( 02 Jan 2020 06:13 )             32.9     04 Jan 2020 07:04    143    |  110    |  30     ----------------------------<  107    3.8     |  24     |  1.20   03 Jan 2020 06:46    144    |  113    |  34     ----------------------------<  98     4.0     |  24     |  1.30   02 Jan 2020 06:13    142    |  110    |  36     ----------------------------<  94     3.6     |  23     |  1.60     Ca    8.1        04 Jan 2020 07:04  Ca    8.2        03 Jan 2020 06:46  Ca    8.4        02 Jan 2020 06:13  Phos  2.7       04 Jan 2020 07:04  Phos  2.4       03 Jan 2020 06:46  Phos  2.8       02 Jan 2020 06:13  Mg     2.0       04 Jan 2020 07:04  Mg     2.0       03 Jan 2020 06:46  Mg     2.0       02 Jan 2020 06:13    PT/INR - ( 04 Jan 2020 07:04 )   PT: 26.4 sec;   INR: 2.26 ratio        < from: TTE Echo Doppler w/o Cont (06.10.19 @ 14:06) >     EXAM:  ECHO TTE WO CON COMP W DOPPLR         PROCEDURE DATE:  06/10/2019        INTERPRETATION:  INDICATION: NSTEMI    Blood Pressure 157/82    Height 154.9 cm     Weight 52.3 kg       BSA   1.49 sq m    Dimensions:    LA 3.6       Normal Values: 2.0 - 4.0 cm    Ao 2.9        Normal Values: 2.0 - 3.8 cm  SEPTUM 1.0       Normal Values: 0.6 - 1.2 cm  PWT 0.9       Normal Values: 0.6 - 1.1 cm  LVIDd 4.2         Normal Values: 3.0 - 5.6 cm  LVIDs 3.4         Normal Values: 1.8 - 4.0 cm      OBSERVATIONS:  Technically difficult study  Mitral Valve: Thickened leaflets, moderate MR.  Aortic Valve/Aorta: Sclerotic trileaflet aortic valve. Mild AI  Tricuspid Valve: normal with mild TR.  Pulmonic Valve: Mild PI  Left Atrium: Enlarged  Right Atrium:normal  Left Ventricle: Endocardium not well-visualized, estimated LVEF of   45-50%. Cannot rule out segmental dysfunction  Right Ventricle: Not well-visualized  Pericardium/Pleura: normal, no significant pericardial effusion.  Pulmonary/RV Pressure:estimated PA systolic pressure of 43 mmHg assuming   an RA pressure of 10 mmHg.  LV diastolic dysfunction is present  Small right-sided pleural effusion    Conclusion:   Technically difficult study  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 Cannot rule out segmental dysfunction  Left atrial enlargement  Sclerotic trileaflet aortic valve. Mild AI  Thickened mitral valve leaflets with moderate MR  Mild TR  Small right-sided pleural effusion      JACQUELINE GOODEN   This document has been electronically signed. Jun 11 2019  8:16AM      < end of copied text >    < from: CT Angio Chest w/ IV Cont (12.28.19 @ 13:25) >    EXAM:  CT ANGIO CHEST (W)AW IC                            PROCEDURE DATE:  12/28/2019          INTERPRETATION:  CT ANGIO CHEST WITHOUT AND OR WITH IV CONTRAST    CLINICAL INFORMATION:  short of breath, atrial fibrillation    TECHNIQUE: Contrast enhanced CT pulmonary angiogram was performed. Multiplanar CT and HRCT images were reviewed. Maximum intensity projection (MIP) images are reconstructed as per CT angiography protocol. Images were acquired during the administration of 61 cc Omnipaque 350IV contrast. This study was performed using automatic exposure control (radiation dose reduction software) to obtain a diagnostic image quality scan with patient dose as low as reasonably achievable.    COMPARISON: same day CXR, CTA chest 6/8/2019    PULMONARY ARTERIES: No pulmonary embolism.    LUNGS, AIRWAYS: The central airways are patent. There is emphysema. Bibasilar mild fibrosis. No focal consolidation. Right midlung groundglass opacity.    PLEURA: No pleural abnormality.    VESSELS: Aortic atherosclerosis without aneurysm. No dissection.    HEART: Normal heart size. No pericardial effusion. Coronary artery calcifications are present.    MEDIASTINUM, ALEN, AXILLAE: No adenopathy.    UPPER ABDOMEN: Gallstones and renal stones. 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    BONES AND CHEST WALL: No acute bony abnormality.    IMPRESSION:     No pulmonary embolism.    Right midlung groundglass opacity. Findings may represent acute infection or inflammation versus progression of pulmonary fibrosis.    GRAZYNA GAGNON M.D., ATTENDING RADIOLOGIST  This document has been electronically signed. Dec 28 2019  2:02PM        < end of copied text >    < from: 12 Lead ECG (12.27.19 @ 16:04) >    Ventricular Rate 87 BPM    Atrial Rate 87 BPM    P-R Interval 166 ms    QRS Duration 98 ms    Q-T Interval 410 ms    QTC Calculation(Bezet) 493 ms    P Axis 45 degrees    R Axis -34 degrees    T Axis 2 degrees    Diagnosis Line Normal sinus rhythm  Left axis deviation  Minimal voltage criteria for LVH, may be normal variant    Confirmed by ANNY FAY ([id number]) on 12/28/2019 10:55:47 AM    < end of copied text > [facility] -- Dahiana Bañuelos, Toney Horner, Truman Fay Savella, Goodger  Office # [phone]    Follow Up:  Afib, Uncontrolled BP    Subjective/Observations: Seen and evaluated comfortable on RA.  Denies any form of bleeding, ie epistaxis.  Denies any respiratory or cardiac discomfort.  Still c/o mild back pain during activity    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  Osteoarthritis  Afib  PVD (peripheral vascular disease)  Neuropathy: (Right lower leg)  H/O cerebral aneurysm repair: brain clips  CVA (cerebral vascular accident): (&quot;Mini-stroke&quot;,1990&#x27;s)  Rheumatoid arthritis  Hyperlipidemia  Hypertension  S/P cataract surgery: (Left eye)  Elective surgery: (&quot;Twisted bowel&quot;, 2014)  Elective surgery: (Exision of cyst on liver, 1985)  S/P ORIF (open reduction internal fixation) fracture: Left hip fx (2012) &amp; R hip fx (2013)  H/O cerebral aneurysm repair: Brain clips (1978(  Renal stone: Cysto stent placement 10/1/2014  PVD (peripheral vascular disease): s/p RLE bypass x 3, most recent 3/2012 Right external iliac to PT bypass w/ PTFE (2012)  S/P FRED (total abdominal hysterectomy): (1987, Hx of &quot;ovarian cancer?&quot;)    MEDICATIONS  (STANDING):  albuterol/ipratropium for Nebulization 3 milliLiter(s) Nebulizer every 8 hours  aspirin enteric coated 81 milliGRAM(s) Oral daily  atorvastatin 40 milliGRAM(s) Oral at bedtime  BACItracin   Ointment 1 Application(s) Topical three times a day  diltiazem    milliGRAM(s) Oral daily  hydrALAZINE 100 milliGRAM(s) Oral every 8 hours  metoprolol succinate  milliGRAM(s) Oral daily  multivitamin 1 Tablet(s) Oral daily  pantoprazole    Tablet 40 milliGRAM(s) Oral two times a day  polyethylene glycol 3350 17 Gram(s) Oral daily  senna 2 Tablet(s) Oral at bedtime  sodium chloride 0.65% Nasal 1 Spray(s) Both Nostrils four times a day  sodium chloride 0.9%. 1000 milliLiter(s) (75 mL/Hr) IV Continuous <Continuous>  sucralfate 1 Gram(s) Oral two times a day    MEDICATIONS  (PRN):  acetaminophen   Tablet .. 650 milliGRAM(s) Oral every 6 hours PRN Mild Pain (1 - 3)  aluminum hydroxide/magnesium hydroxide/simethicone Suspension 30 milliLiter(s) Oral every 6 hours PRN Dyspepsia  lidocaine   Patch 1 Patch Transdermal daily PRN back pain  melatonin 5 milliGRAM(s) Oral at bedtime PRN Insomnia  oxycodone    5 mG/acetaminophen 325 mG 1 Tablet(s) Oral every 8 hours PRN Severe Pain (7 - 10)  trimethobenzamide 300 milliGRAM(s) Oral three times a day PRN Nausea and/or Vomiting    Allergies    No Known Allergies    Intolerances      Vital Signs Last 24 Hrs  T(C): 36.4 (04 Jan 2020 07:33), Max: 36.7 (03 Jan 2020 11:13)  T(F): 97.5 (04 Jan 2020 07:33), Max: 98 (03 Jan 2020 11:13)  HR: 70 (04 Jan 2020 07:33) (64 - 79)  BP: 159/74 (04 Jan 2020 07:33) (116/72 - 159/74)  BP(mean): --  RR: 18 (04 Jan 2020 07:33) (18 - 18)  SpO2: 96% (04 Jan 2020 07:33) (95% - 100%)  I&O's Summary    03 Jan 2020 07:01  -  04 Jan 2020 07:00  --------------------------------------------------------  IN: 720 mL / OUT: 0 mL / NET: 720 mL     PHYSICAL EXAM:  TELE: Not on tele  Constitutional: NAD, awake and alert, cachectic  HEENT: Moist Mucous Membranes, Anicteric  Pulmonary: Non-labored, breath sounds are clear bilaterally, No wheezing, rales or rhonchi  Cardiovascular: Regular, S1 and S2, No murmurs, rubs, gallops or clicks  Gastrointestinal: Bowel Sounds present, soft, nontender.   Lymph: No peripheral edema. No lymphadenopathy.  Skin: No visible rashes or ulcers.  Psych:  Mood & affect appropriate  LABS: All Labs Reviewed:                        9.9    6.97  )-----------( 188      ( 04 Jan 2020 07:04 )             31.6                         9.5    7.48  )-----------( 196      ( 03 Jan 2020 06:46 )             31.3                         10.2   7.97  )-----------( 197      ( 02 Jan 2020 06:13 )             32.9     04 Jan 2020 07:04    143    |  110    |  30     ----------------------------<  107    3.8     |  24     |  1.20   03 Jan 2020 06:46    144    |  113    |  34     ----------------------------<  98     4.0     |  24     |  1.30   02 Jan 2020 06:13    142    |  110    |  36     ----------------------------<  94     3.6     |  23     |  1.60     Ca    8.1        04 Jan 2020 07:04  Ca    8.2        03 Jan 2020 06:46  Ca    8.4        02 Jan 2020 06:13  Phos  2.7       04 Jan 2020 07:04  Phos  2.4       03 Jan 2020 06:46  Phos  2.8       02 Jan 2020 06:13  Mg     2.0       04 Jan 2020 07:04  Mg     2.0       03 Jan 2020 06:46  Mg     2.0       02 Jan 2020 06:13    PT/INR - ( 04 Jan 2020 07:04 )   PT: 26.4 sec;   INR: 2.26 ratio        < from: TTE Echo Doppler w/o Cont (06.10.19 @ 14:06) >     EXAM:  ECHO TTE WO CON COMP W DOPPLR         PROCEDURE DATE:  06/10/2019        INTERPRETATION:  INDICATION: NSTEMI    Blood Pressure 157/82    Height 154.9 cm     Weight 52.3 kg       BSA   1.49 sq m    Dimensions:    LA 3.6       Normal Values: 2.0 - 4.0 cm    Ao 2.9        Normal Values: 2.0 - 3.8 cm  SEPTUM 1.0       Normal Values: 0.6 - 1.2 cm  PWT 0.9       Normal Values: 0.6 - 1.1 cm  LVIDd 4.2         Normal Values: 3.0 - 5.6 cm  LVIDs 3.4         Normal Values: 1.8 - 4.0 cm      OBSERVATIONS:  Technically difficult study  Mitral Valve: Thickened leaflets, moderate MR.  Aortic Valve/Aorta: Sclerotic trileaflet aortic valve. Mild AI  Tricuspid Valve: normal with mild TR.  Pulmonic Valve: Mild PI  Left Atrium: Enlarged  Right Atrium:normal  Left Ventricle: Endocardium not well-visualized, estimated LVEF of   45-50%. Cannot rule out segmental dysfunction  Right Ventricle: Not well-visualized  Pericardium/Pleura: normal, no significant pericardial effusion.  Pulmonary/RV Pressure:estimated PA systolic pressure of 43 mmHg assuming   an RA pressure of 10 mmHg.  LV diastolic dysfunction is present  Small right-sided pleural effusion    Conclusion:   Technically difficult study  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 Cannot rule out segmental dysfunction  Left atrial enlargement  Sclerotic trileaflet aortic valve. Mild AI  Thickened mitral valve leaflets with moderate MR  Mild TR  Small right-sided pleural effusion      JACQUELINE GOODEN   This document has been electronically signed. Jun 11 2019  8:16AM      < end of copied text >    < from: CT Angio Chest w/ IV Cont (12.28.19 @ 13:25) >    EXAM:  CT ANGIO CHEST (W)AW IC                            PROCEDURE DATE:  12/28/2019          INTERPRETATION:  CT ANGIO CHEST WITHOUT AND OR WITH IV CONTRAST    CLINICAL INFORMATION:  short of breath, atrial fibrillation    TECHNIQUE: Contrast enhanced CT pulmonary angiogram was performed. Multiplanar CT and HRCT images were reviewed. Maximum intensity projection (MIP) images are reconstructed as per CT angiography protocol. Images were acquired during the administration of 61 cc Omnipaque 350IV contrast. This study was performed using automatic exposure control (radiation dose reduction software) to obtain a diagnostic image quality scan with patient dose as low as reasonably achievable.    COMPARISON: same day CXR, CTA chest 6/8/2019    PULMONARY ARTERIES: No pulmonary embolism.    LUNGS, AIRWAYS: The central airways are patent. There is emphysema. Bibasilar mild fibrosis. No focal consolidation. Right midlung groundglass opacity.    PLEURA: No pleural abnormality.    VESSELS: Aortic atherosclerosis without aneurysm. No dissection.    HEART: Normal heart size. No pericardial effusion. Coronary artery calcifications are present.    MEDIASTINUM, ALEN, AXILLAE: No adenopathy.    UPPER ABDOMEN: Gallstones and renal stones. 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    BONES AND CHEST WALL: No acute bony abnormality.    IMPRESSION:     No pulmonary embolism.    Right midlung groundglass opacity. Findings may represent acute infection or inflammation versus progression of pulmonary fibrosis.    GRAZYNA GAGNON M.D., ATTENDING RADIOLOGIST  This document has been electronically signed. Dec 28 2019  2:02PM        < end of copied text >    < from: 12 Lead ECG (12.27.19 @ 16:04) >    Ventricular Rate 87 BPM    Atrial Rate 87 BPM    P-R Interval 166 ms    QRS Duration 98 ms    Q-T Interval 410 ms    QTC Calculation(Bezet) 493 ms    P Axis 45 degrees    R Axis -34 degrees    T Axis 2 degrees    Diagnosis Line Normal sinus rhythm  Left axis deviation  Minimal voltage criteria for LVH, may be normal variant    Confirmed by ANNY FAY ([id number]) on 12/28/2019 10:55:47 AM    < end of copied text >

## 2020-01-04 NOTE — PROGRESS NOTE ADULT - ATTENDING COMMENTS
Advanced care planning was discussed with patient and family.  Advanced care planning forms were reviewed and discussed.  Risks, benefits and alternatives of gastroenterologic procedures were discussed in detail and all questions were answered.    30 minutes spent.
Chart reviewed    Patient seen and examined     Agree with plan as outlined above
I personally saw and examined the patient in detail.  I have spoken to the above provider regarding the assessment and plan of care.  I reviewed the above assessment and plan of care, and agree.  I have made changes in the body of the note where appropriate.
I personally saw and examined the patient in detail.  I have spoken to the above provider regarding the assessment and plan of care.  I reviewed the above assessment and plan of care, and agree.  I have made changes in the body of the note where appropriate.
Seen/examined. agree with above.
I personally saw and examined the patient in detail.  I have spoken to the above provider regarding the assessment and plan of care.  I reviewed the above assessment and plan of care, and agree.  I have made changes in the body of the note where appropriate.
I saw and examined the patient personally. Spoke with above provider regarding this case. I reviewed the above findings completely.  I agree with the above history, physical, and plan which I have edited where appropriate.
I saw and examined the patient personally. Spoke with above provider regarding this case. I reviewed the above findings completely.  I agree with the above history, physical, and plan which I have edited where appropriate.
Note written by attending, see above.  Time spent: 40min. More than 50% of the visit was spent counseling the patient on her condition - afib, coumadin dosing, INR, AAA,
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; adm w/persistent epistaxis - s/p eval by ENT w/impression likely 2/2 dry mucous membranes, acute blood loss anemia/transfusion, and CT eval of abd w/3.8cm AAA w/new thrombus in lumen  -epistaxis - remains w/o further episodes; per ENT, likely 2/2 dry mucous membranes  -acute blood loss anemia - s/p transfusion - hgb in 9-10s range - monitoring for stability  -AAA - noted on CTA abd - noted to have new thrombus in lumen - d/w Vascular Surgery/Princess - indicated no mgmt implications - pt can continue f/u as o/p  -Afib - meg tx w/metoprolol, diltiazem; restarted coumadin - goal INR 2-3  -PVD, hx CVA - continue lipitor; not on antiplatelets  -HTN - continue multidrug regimen of diltiazem, metoprolol, hydralazine  -dvt prophy - therap a/c w/coumadin  -PT mobilization - for TIFFANIE  -d/c plannign
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; adm w/persistent epistaxis - s/p eval by ENT w/impression likely 2/2 dry mucous membranes, acute blood loss anemia/transfusion, and CT eval of abd w/3.8cm AAA w/new thrombus in lumen  -epistaxis - remains w/o further episodes; per ENT, likely 2/2 dry mucous membranes  -acute blood loss anemia - s/p transfusion - hgb remains stable in 10s range  -AAA - noted on CTA abd - noted to have new thrombus in lumen - d/w Vascular Surgery/[facility] - indicated no mgmt implications - pt can continue f/u as o/p  -Afib - meg tx w/metoprolol, diltiazem; restarted coumadin - goal INR 2-3  -PVD, hx CVA - continue lipitor; not on antiplatelets  -HTN - continue multidrug regimen of diltiazem, metoprolol, hydralazine  -dvt prophy - therap a/c w/coumadin  -PT mobilization
84F, HTN, HL, Afib/coumadin, hx CVA, PVD/RLE stents, RA; adm w/persistent epistaxis - s/p eval by ENT w/impression likely 2/2 dry mucous membranes, acute blood loss anemia/transfusion, and CT eval of abd w/3.8cm AAA w/new thrombus in lumen  -epistaxis - pt reports resolved at this time; per ENT, likely 2/2 dry mucous membranes  -acute blood loss anemia - s/p transfusion - w/stable hgb 10s range  -AAA - noted on CTA abd - noted to have new thrombus in lumen - d/w Vascular Surgery re clinical implications  -Afib - meg tx w/metoprolol, diltiazem; re-coumadinizing p epistaxis - goal INR 2-3  -PVD, hx CVA - continue lipitor; not on antiplatelet  -HTN - continue multidrug regimen of diltiazem, metoprolol, hydralazine  -dvt prophy - therap a/c w/coumadin  -PT mobilization
84/F w/ PMHx of afib (coumadin), CVA and hx of aneurysm repair, HTN, HLD, mitral valve prolapse, PVD s/p stents x 3 (RLE), rheumatoid arthritis, cataracts of the L eye BIBEMS to ED for evaluation of epistaxis, which resolved without intervention, admitted for weakness. Now s/p 1 unit prbcs for acute blood loss anemia. Plan: f/u us abd, monitor clinical course, apprec  cardio and hemat collaboration, apprec gi recs as still has some abd and back pain, dc planning once clinically improved to b[facility]arda

## 2020-01-04 NOTE — PROGRESS NOTE ADULT - REASON FOR ADMISSION
Epistaxis

## 2020-01-04 NOTE — PROGRESS NOTE ADULT - SUBJECTIVE AND OBJECTIVE BOX
INTERVAL HPI/OVERNIGHT EVENTS:  No new overnight event.  No N/V/D.  Tolerating diet.    Allergies    No Known Allergies    Intolerances          General:  No wt loss, fevers, chills, night sweats, fatigue,   Eyes:  Good vision, no reported pain  ENT:  No sore throat, pain, runny nose, dysphagia  CV:  No pain, palpitations, hypo/hypertension  Resp:  No dyspnea, cough, tachypnea, wheezing  GI:  No pain, No nausea, No vomiting, No diarrhea, No constipation, No weight loss, No fever, No pruritis, No rectal bleeding, No tarry stools, No dysphagia,  :  No pain, bleeding, incontinence, nocturia  Muscle:  No pain, weakness  Neuro:  No weakness, tingling, memory problems  Psych:  No fatigue, insomnia, mood problems, depression  Endocrine:  No polyuria, polydipsia, cold/heat intolerance  Heme:  No petechiae, ecchymosis, easy bruisability  Skin:  No rash, tattoos, scars, edema      PHYSICAL EXAM:   Vital Signs:  Vital Signs Last 24 Hrs  T(C): 36.6 (04 Jan 2020 11:24), Max: 36.6 (04 Jan 2020 11:24)  T(F): 97.9 (04 Jan 2020 11:24), Max: 97.9 (04 Jan 2020 11:24)  HR: 70 (04 Jan 2020 11:24) (67 - 79)  BP: 112/70 (04 Jan 2020 11:24) (112/70 - 159/74)  BP(mean): --  RR: 18 (04 Jan 2020 11:24) (18 - 18)  SpO2: 97% (04 Jan 2020 11:24) (95% - 100%)  Daily     Daily I&O's Summary    03 Jan 2020 07:01  -  04 Jan 2020 07:00  --------------------------------------------------------  IN: 720 mL / OUT: 0 mL / NET: 720 mL        GENERAL:  Appears stated age, well-groomed, well-nourished, no distress  HEENT:  NC/AT,  conjunctivae clear and pink, no thyromegaly, nodules, adenopathy, no JVD, sclera -anicteric  CHEST:  Full & symmetric excursion, no increased effort, breath sounds clear  HEART:  Regular rhythm, S1, S2, no murmur/rub/S3/S4, no abdominal bruit, no edema  ABDOMEN:  Soft, non-tender, non-distended, normoactive bowel sounds,  no masses ,no hepato-splenomegaly, no signs of chronic liver disease  EXTEREMITIES:  no cyanosis,clubbing or edema  SKIN:  No rash/erythema/ecchymoses/petechiae/wounds/abscess/warm/dry  NEURO:  Alert, oriented, no asterixis, no tremor, no encephalopathy      LABS:                        9.9    6.97  )-----------( 188      ( 04 Jan 2020 07:04 )             31.6     01-04    143  |  110<H>  |  30<H>  ----------------------------<  107<H>  3.8   |  24  |  1.20    Ca    8.1<L>      04 Jan 2020 07:04  Phos  2.7     01-04  Mg     2.0     01-04      PT/INR - ( 04 Jan 2020 07:04 )   PT: 26.4 sec;   INR: 2.26 ratio             amylase   lipase  RADIOLOGY & ADDITIONAL TESTS:

## 2020-01-04 NOTE — PROGRESS NOTE ADULT - PROVIDER SPECIALTY LIST ADULT
Cardiology
Gastroenterology
Heme/Onc
Heme/Onc
Hospitalist
Pulmonology
Gastroenterology

## 2020-01-04 NOTE — DISCHARGE NOTE NURSING/CASE MANAGEMENT/SOCIAL WORK - PATIENT PORTAL LINK FT
You can access the FollowMyHealth Patient Portal offered by [facility] by registering at the following website: [email or web address]. By joining [employer]’s FollowMyHealth portal, you will also be able to view your health information using other applications (apps) compatible with our system.

## 2020-01-04 NOTE — DISCHARGE NOTE NURSING/CASE MANAGEMENT/SOCIAL WORK - NSDCPEPTCOWAR_GEN_ALL_CORE
Warfarin/Coumadin - Potential for adverse drug reactions and interactions/Warfarin/Coumadin - Compliance/Warfarin/Coumadin - Dietary Advice/Warfarin/Coumadin - Follow up monitoring

## 2020-01-17 ENCOUNTER — APPOINTMENT (OUTPATIENT)
Dept: CARDIOLOGY | Facility: CLINIC | Age: 85
End: 2020-01-17

## 2020-02-10 ENCOUNTER — APPOINTMENT (OUTPATIENT)
Dept: CARDIOLOGY | Facility: CLINIC | Age: 85
End: 2020-02-10
Payer: MEDICARE

## 2020-02-10 ENCOUNTER — NON-APPOINTMENT (OUTPATIENT)
Age: 85
End: 2020-02-10

## 2020-02-10 VITALS
HEART RATE: 66 BPM | OXYGEN SATURATION: 97 % | SYSTOLIC BLOOD PRESSURE: 114 MMHG | BODY MASS INDEX: 19.26 KG/M2 | WEIGHT: 102 LBS | HEIGHT: 61 IN | DIASTOLIC BLOOD PRESSURE: 65 MMHG

## 2020-02-10 DIAGNOSIS — R60.0 LOCALIZED EDEMA: ICD-10-CM

## 2020-02-10 DIAGNOSIS — I48.0 PAROXYSMAL ATRIAL FIBRILLATION: ICD-10-CM

## 2020-02-10 LAB
INR PPP: 1.8 RATIO
QUALITY CONTROL: YES

## 2020-02-10 PROCEDURE — 85610 PROTHROMBIN TIME: CPT | Mod: QW

## 2020-02-10 PROCEDURE — 99214 OFFICE O/P EST MOD 30 MIN: CPT

## 2020-02-10 PROCEDURE — 93000 ELECTROCARDIOGRAM COMPLETE: CPT

## 2020-02-10 NOTE — REASON FOR VISIT
[Follow-Up - From Hospitalization] : follow-up of a recent hospitalization for [Anticoagulation] : anticoagulation [Atrial Fibrillation] : atrial fibrillation [Peripheral Vascular Disease] : peripheral vascular disease [Dyspnea] : dyspnea

## 2020-02-10 NOTE — HISTORY OF PRESENT ILLNESS
[FreeTextEntry1] : 84 year-old woman with a history of hypercholesterolemia, hypertension, rheumatoid arthritis, cerebral aneurysm status post clipping, PAD status post femoropopliteal bypass with subsequent right leg femoropopliteal bypass thrombosis and infected wound, redo bypass with failure, then PCI with failure, paroxysmal atrial fibrillation previously on AC with Eliquis which was converted to Coumadin after an episode of UGIB. She had admission to [facility] where she had significant abdominal pain requiring a emergent laparotomy and reduction of closed loop  with short segment of SB excised. her course was complicated by rapid atrial fibrillation and mild heart failure. He was also complicated by bleeding from the anastomosis site in the setting of an increased INR. She was stabilized and then subsequently sent to rehabilitation. Then she had a fall with a hip fracture from a mechanical fall s/p ORIF. She is now walking with a walker.\par In 11/2017 she was hospitalized for a gangrenous right toe with possible signs of osteomyelitis (no MR 2/2 brain clippings). He had a long and complicated hospital coarse. Podiatry recommended a amputation. An IR angiogram showed multilevel occlusions involving the right common femoral artery, the \par superficial femoral artery and popliteal artery with collateral vessels feeding single vessel below knee. Another bypass was considered to high risk and likely to have poor outcomes. She was offered an AKA but refused. She eneded up getting an amputation of her toe. Her course was complicated by anemia and also multiple episodes of AF with RVR. \par She was readmitted to  for for significant diarrhea leading to shock and respiratory failure. She was significantly bradycardic requiring a TVP. Once her metabolic derangement improved she returned to SR with intermittent PAF.   \par \par  she was admitted to  in summer on 2018 for a prolong stay for  a UTI and infected gallstone requiring an ERCP. \par she was admitted to  in 1/2019 for colitis. she was treated with antibiotics and her symptoms resolved. \par she was admitted at  in 6/2019 for anemia from GIB (Hb ~5).  She had an egd that was shown to have esophageal candidiasis. Her Coumadin was resumed. She went to rehab for 1 week. \par \par She now presents for a followup visit. \par Since her last visit she was admitted to  for epistaxis with significant anemia. She was also noted to have acute blood loss anemia. It was felt her epistaxis was from dry nares. She was also treated for a aspiration PNA. On imaging, pt was noted to have stable AAA, w/new thrombus noted in lumen - Vasc Surgery was consulted re new thrombus, and recommended no further acute intervention. her coumadin was resumed. \par She was dc'd to rehab. \par \par She notes that for 2 weeks she has increased left lower extremity edema. She is pending a lower extremity doppler.  Denies any worsening claudication. \par She   denies any chest pain, dyspnea, PND, orthopnea , strokelike symptoms.    She is compliant with her medications. No reported melena, hematochezia or hematemesis.  \par  She denies any blurry vision, headaches or recent stroke like symptoms. \par She is compliant with her medications. She is note to be on both cardizem and Norvasc. \par

## 2020-02-10 NOTE — CARDIOLOGY SUMMARY
[___] : [unfilled] [No Ischemia] : no Ischemia [None] : normal LV function [Mild] : mild mitral regurgitation

## 2020-02-10 NOTE — PHYSICAL EXAM
[Normal Appearance] : normal appearance [General Appearance - In No Acute Distress] : no acute distress [Normal Conjunctiva] : the conjunctiva exhibited no abnormalities [No Oral Pallor] : no oral pallor [Normal Jugular Venous V Waves Present] : normal jugular venous V waves present [Respiration, Rhythm And Depth] : normal respiratory rhythm and effort [Exaggerated Use Of Accessory Muscles For Inspiration] : no accessory muscle use [Auscultation Breath Sounds / Voice Sounds] : lungs were clear to auscultation bilaterally [Bowel Sounds] : normal bowel sounds [Abdomen Soft] : soft [Abdomen Tenderness] : non-tender [Skin Lesions] : no skin lesions [] : no rash [Oriented To Time, Place, And Person] : oriented to person, place, and time [Affect] : the affect was normal [Mood] : the mood was normal [Rhythm Regular] : regular [Normal S1] : normal S1 [Normal Rate] : normal [Normal S2] : normal S2 [No Gallop] : no gallop heard [I] : a grade 1 [0] : right 0 [___ +] : bilateral [unfilled]U+ pitting edema to the ankles [1+] : left 1+ [Rt] : varicose veins of the right leg noted [Lt] : varicose veins of the left leg noted [FreeTextEntry1] : swan neck deformities in hands [Left Carotid Bruit] : no bruit heard over the left carotid [Right Carotid Bruit] : no bruit heard over the right carotid

## 2020-02-10 NOTE — REVIEW OF SYSTEMS
[Joint Pain] : joint pain [see HPI] : see HPI [Joint Stiffness] : joint stiffness [Negative] : Heme/Lymph [Dyspnea on exertion] : not dyspnea during exertion [Shortness Of Breath] : no shortness of breath [Headache] : no headache [Chest  Pressure] : no chest pressure [Lower Ext Edema] : no extremity edema [Chest Pain] : no chest pain [Leg Claudication] : no intermittent leg claudication [Palpitations] : no palpitations [Change In The Stool] : no change in stool

## 2020-02-19 LAB
INR PPP: 2.3 RATIO
PT BLD: 26.9 SEC

## 2020-03-02 LAB
INR PPP: 3.35 RATIO
PT BLD: 39.7 SEC

## 2020-03-04 LAB
ALBUMIN SERPL ELPH-MCNC: 3.9 G/DL
ALP BLD-CCNC: 70 U/L
ALT SERPL-CCNC: 14 U/L
ANION GAP SERPL CALC-SCNC: 14 MMOL/L
AST SERPL-CCNC: 20 U/L
BILIRUB SERPL-MCNC: 0.3 MG/DL
BUN SERPL-MCNC: 41 MG/DL
CALCIUM SERPL-MCNC: 9.3 MG/DL
CHLORIDE SERPL-SCNC: 103 MMOL/L
CO2 SERPL-SCNC: 28 MMOL/L
CREAT SERPL-MCNC: 1.52 MG/DL
GLUCOSE SERPL-MCNC: 82 MG/DL
POTASSIUM SERPL-SCNC: 5.7 MMOL/L
PROT SERPL-MCNC: 6.4 G/DL
SODIUM SERPL-SCNC: 146 MMOL/L

## 2020-03-09 LAB
ALBUMIN SERPL ELPH-MCNC: 4.1 G/DL
ALP BLD-CCNC: 72 U/L
ALT SERPL-CCNC: 15 U/L
ANION GAP SERPL CALC-SCNC: 13 MMOL/L
AST SERPL-CCNC: 22 U/L
BILIRUB SERPL-MCNC: 0.5 MG/DL
BUN SERPL-MCNC: 30 MG/DL
CALCIUM SERPL-MCNC: 9.2 MG/DL
CHLORIDE SERPL-SCNC: 105 MMOL/L
CO2 SERPL-SCNC: 27 MMOL/L
CREAT SERPL-MCNC: 1.42 MG/DL
GLUCOSE SERPL-MCNC: 84 MG/DL
INR PPP: 1.99 RATIO
POTASSIUM SERPL-SCNC: 4.9 MMOL/L
PROT SERPL-MCNC: 6.5 G/DL
PT BLD: 23 SEC
SODIUM SERPL-SCNC: 145 MMOL/L

## 2020-03-23 ENCOUNTER — LABORATORY RESULT (OUTPATIENT)
Age: 85
End: 2020-03-23

## 2020-03-31 ENCOUNTER — LABORATORY RESULT (OUTPATIENT)
Age: 85
End: 2020-03-31

## 2020-03-31 ENCOUNTER — RX RENEWAL (OUTPATIENT)
Age: 85
End: 2020-03-31

## 2020-03-31 RX ORDER — METOPROLOL SUCCINATE 100 MG/1
100 TABLET, EXTENDED RELEASE ORAL DAILY
Qty: 90 | Refills: 3 | Status: ACTIVE | COMMUNITY
Start: 2018-08-09

## 2020-04-09 ENCOUNTER — LABORATORY RESULT (OUTPATIENT)
Age: 85
End: 2020-04-09

## 2020-04-15 NOTE — DISCHARGE NOTE ADULT - GO FOR BLOOD TESTS AS DIRECTED. BECAUSE YOUR DOSE IS BASED ON THE PT/INR BLOOD TEST, IT IS VERY IMPORTANT THAT YOU GET YOUR BLOOD TESTED ON THE SCHEDULED DATE AND TIME AND TO KEEP YOUR HEALTH CARE PROVIDER
Chief Complaint   Patient presents with   Yair Leonard is here for Xolair injection. Patient did not have any reaction to last injection. Patient states the Xolair is helping her Asthma. Injected subcutaneous : 150 mg given in Left upper arm SQ,  150 mg given in Right upper arm SQ. Lot Number: [id number]  Exp Date: 2/2021  Total Xolair Dose: 300 mg every 4 weeks  Epipen is with patient. Patient observed for 15 minutes. No reaction at injection site. Patient given Xolair reaction sheet. Statement Selected

## 2020-04-22 LAB
INR PPP: 1.59 RATIO
PT BLD: 18.2 SEC

## 2020-05-06 ENCOUNTER — LABORATORY RESULT (OUTPATIENT)
Age: 85
End: 2020-05-06

## 2020-05-13 ENCOUNTER — RX RENEWAL (OUTPATIENT)
Age: 85
End: 2020-05-13

## 2020-05-13 RX ORDER — FUROSEMIDE 40 MG/1
40 TABLET ORAL
Qty: 45 | Refills: 3 | Status: ACTIVE | COMMUNITY
Start: 2020-02-10

## 2020-05-13 RX ORDER — DILTIAZEM HYDROCHLORIDE 240 MG/1
240 CAPSULE, EXTENDED RELEASE ORAL
Qty: 90 | Refills: 3 | Status: ACTIVE | COMMUNITY
Start: 2018-08-09

## 2020-05-14 RX ORDER — HYDRALAZINE HYDROCHLORIDE 10 MG/1
10 TABLET ORAL
Qty: 270 | Refills: 0 | Status: ACTIVE | COMMUNITY
Start: 2018-03-06 | End: 1900-01-01

## 2020-05-27 LAB
INR PPP: 1.64 RATIO
PT BLD: 19 SEC

## 2020-06-03 LAB
INR PPP: 1.58 RATIO
PT BLD: 18.1 SEC

## 2020-06-15 NOTE — PROGRESS NOTE ADULT - ASSESSMENT
Spoke with Lea the patients mother and advised her below. She verbalizes understanding of this information and has no further questions or concerns regarding this matter but will call if any should arise.     85 yo female w/ PMH of afib on coumadin, CVA and hx of aneurysm repair, HTN, HLD, mitral valve prolapse, PVD s/p stents x 3 (RLE), rheumatoid arthritis, cataracts presented c/o abdominal pain with melanotic stools and severe anemia likely secondary to upper GIB,  found to have NSTEMI.

## 2020-06-18 ENCOUNTER — APPOINTMENT (OUTPATIENT)
Dept: CARDIOLOGY | Facility: CLINIC | Age: 85
End: 2020-06-18

## 2020-06-18 LAB
INR PPP: 3.57 RATIO
PT BLD: 42 SEC

## 2020-07-01 DIAGNOSIS — Z51.81 ENCOUNTER FOR THERAPEUTIC DRUG LVL MONITORING: ICD-10-CM

## 2020-07-01 DIAGNOSIS — Z79.01 ENCOUNTER FOR THERAPEUTIC DRUG LVL MONITORING: ICD-10-CM

## 2020-07-01 DIAGNOSIS — Z79.01 LONG TERM (CURRENT) USE OF ANTICOAGULANTS: ICD-10-CM

## 2020-07-02 LAB
INR PPP: 2 RATIO
PT BLD: 22.8 SEC

## 2020-07-16 LAB
INR PPP: 1.49 RATIO
PT BLD: 17.3 SEC

## 2020-12-03 NOTE — PHYSICAL THERAPY INITIAL EVALUATION ADULT - IMPAIRED TRANSFERS: SIT/STAND, REHAB EVAL
[FreeTextEntry1] : This 50 year-old male patient presents for cardiovascular evaluation.\par \par His problem list is as noted above.\par \par Since his last examination 1 year ago he reports no major events or hospitalizations.  He is active and exercising regularly.  There are no symptoms of chest discomfort, shortness of breath, palpitation, lightheadedness, fainting.  He is feeling well.\par \par He had laboratories that were kindly provided.\par \par He is seen at the patient and his primary care physician's request.
decreased strength/decreased flexibility

## 2021-01-01 ENCOUNTER — TRANSCRIPTION ENCOUNTER (OUTPATIENT)
Age: 86
End: 2021-01-01

## 2021-01-01 ENCOUNTER — INPATIENT (INPATIENT)
Facility: HOSPITAL | Age: 86
LOS: 9 days | Discharge: HOME | DRG: 871 | End: 2021-01-25
Attending: HOSPITALIST | Admitting: HOSPITALIST
Payer: MEDICARE

## 2021-01-01 ENCOUNTER — INPATIENT (INPATIENT)
Facility: HOSPITAL | Age: 86
LOS: 2 days | Discharge: NURSING, REHAB OR LONG-TERM CARE | DRG: 871 | End: 2021-01-08
Attending: STUDENT IN AN ORGANIZED HEALTH CARE EDUCATION/TRAINING PROGRAM | Admitting: INTERNAL MEDICINE
Payer: MEDICARE

## 2021-01-01 VITALS
DIASTOLIC BLOOD PRESSURE: 121 MMHG | RESPIRATION RATE: 19 BRPM | SYSTOLIC BLOOD PRESSURE: 182 MMHG | HEART RATE: 137 BPM | TEMPERATURE: 98 F | HEIGHT: 61 IN | WEIGHT: 139.99 LBS | OXYGEN SATURATION: 95 %

## 2021-01-01 VITALS
OXYGEN SATURATION: 96 % | TEMPERATURE: 97 F | DIASTOLIC BLOOD PRESSURE: 75 MMHG | HEART RATE: 63 BPM | RESPIRATION RATE: 16 BRPM | SYSTOLIC BLOOD PRESSURE: 136 MMHG

## 2021-01-01 VITALS
OXYGEN SATURATION: 93 % | SYSTOLIC BLOOD PRESSURE: 141 MMHG | DIASTOLIC BLOOD PRESSURE: 80 MMHG | RESPIRATION RATE: 16 BRPM | HEART RATE: 91 BPM | TEMPERATURE: 98 F

## 2021-01-01 VITALS
HEART RATE: 103 BPM | WEIGHT: 128.97 LBS | RESPIRATION RATE: 16 BRPM | OXYGEN SATURATION: 100 % | HEIGHT: 61 IN | SYSTOLIC BLOOD PRESSURE: 137 MMHG | DIASTOLIC BLOOD PRESSURE: 95 MMHG | TEMPERATURE: 98 F

## 2021-01-01 DIAGNOSIS — Z96.7 PRESENCE OF OTHER BONE AND TENDON IMPLANTS: Chronic | ICD-10-CM

## 2021-01-01 DIAGNOSIS — A41.9 SEPSIS, UNSPECIFIED ORGANISM: ICD-10-CM

## 2021-01-01 DIAGNOSIS — Z98.89 OTHER SPECIFIED POSTPROCEDURAL STATES: Chronic | ICD-10-CM

## 2021-01-01 DIAGNOSIS — I48.91 UNSPECIFIED ATRIAL FIBRILLATION: ICD-10-CM

## 2021-01-01 DIAGNOSIS — I73.9 PERIPHERAL VASCULAR DISEASE, UNSPECIFIED: ICD-10-CM

## 2021-01-01 DIAGNOSIS — E78.5 HYPERLIPIDEMIA, UNSPECIFIED: ICD-10-CM

## 2021-01-01 DIAGNOSIS — R41.82 ALTERED MENTAL STATUS, UNSPECIFIED: ICD-10-CM

## 2021-01-01 DIAGNOSIS — Z41.9 ENCOUNTER FOR PROCEDURE FOR PURPOSES OTHER THAN REMEDYING HEALTH STATE, UNSPECIFIED: Chronic | ICD-10-CM

## 2021-01-01 DIAGNOSIS — R50.9 FEVER, UNSPECIFIED: ICD-10-CM

## 2021-01-01 DIAGNOSIS — J18.9 PNEUMONIA, UNSPECIFIED ORGANISM: ICD-10-CM

## 2021-01-01 DIAGNOSIS — Z29.9 ENCOUNTER FOR PROPHYLACTIC MEASURES, UNSPECIFIED: ICD-10-CM

## 2021-01-01 DIAGNOSIS — N17.9 ACUTE KIDNEY FAILURE, UNSPECIFIED: ICD-10-CM

## 2021-01-01 DIAGNOSIS — I10 ESSENTIAL (PRIMARY) HYPERTENSION: ICD-10-CM

## 2021-01-01 DIAGNOSIS — I71.4 ABDOMINAL AORTIC ANEURYSM, WITHOUT RUPTURE: ICD-10-CM

## 2021-01-01 DIAGNOSIS — N20.0 CALCULUS OF KIDNEY: Chronic | ICD-10-CM

## 2021-01-01 DIAGNOSIS — K80.20 CALCULUS OF GALLBLADDER WITHOUT CHOLECYSTITIS WITHOUT OBSTRUCTION: ICD-10-CM

## 2021-01-01 DIAGNOSIS — Z98.49 CATARACT EXTRACTION STATUS, UNSPECIFIED EYE: Chronic | ICD-10-CM

## 2021-01-01 DIAGNOSIS — I95.9 HYPOTENSION, UNSPECIFIED: ICD-10-CM

## 2021-01-01 DIAGNOSIS — I63.9 CEREBRAL INFARCTION, UNSPECIFIED: ICD-10-CM

## 2021-01-01 DIAGNOSIS — U07.1 COVID-19: ICD-10-CM

## 2021-01-01 DIAGNOSIS — K52.9 NONINFECTIVE GASTROENTERITIS AND COLITIS, UNSPECIFIED: ICD-10-CM

## 2021-01-01 DIAGNOSIS — I16.0 HYPERTENSIVE URGENCY: ICD-10-CM

## 2021-01-01 LAB
ALBUMIN SERPL ELPH-MCNC: 2.1 G/DL — LOW (ref 3.3–5)
ALBUMIN SERPL ELPH-MCNC: 2.1 G/DL — LOW (ref 3.3–5)
ALBUMIN SERPL ELPH-MCNC: 2.2 G/DL — LOW (ref 3.3–5)
ALBUMIN SERPL ELPH-MCNC: 2.3 G/DL — LOW (ref 3.3–5)
ALBUMIN SERPL ELPH-MCNC: 2.4 G/DL — LOW (ref 3.3–5)
ALBUMIN SERPL ELPH-MCNC: 2.6 G/DL — LOW (ref 3.3–5)
ALBUMIN SERPL ELPH-MCNC: 2.7 G/DL — LOW (ref 3.3–5)
ALBUMIN SERPL ELPH-MCNC: 3.4 G/DL — SIGNIFICANT CHANGE UP (ref 3.3–5)
ALP SERPL-CCNC: 100 U/L — SIGNIFICANT CHANGE UP (ref 40–120)
ALP SERPL-CCNC: 103 U/L — SIGNIFICANT CHANGE UP (ref 40–120)
ALP SERPL-CCNC: 104 U/L — SIGNIFICANT CHANGE UP (ref 40–120)
ALP SERPL-CCNC: 107 U/L — SIGNIFICANT CHANGE UP (ref 40–120)
ALP SERPL-CCNC: 120 U/L — SIGNIFICANT CHANGE UP (ref 40–120)
ALP SERPL-CCNC: 126 U/L — HIGH (ref 40–120)
ALP SERPL-CCNC: 78 U/L — SIGNIFICANT CHANGE UP (ref 40–120)
ALP SERPL-CCNC: 79 U/L — SIGNIFICANT CHANGE UP (ref 40–120)
ALP SERPL-CCNC: 81 U/L — SIGNIFICANT CHANGE UP (ref 40–120)
ALP SERPL-CCNC: 91 U/L — SIGNIFICANT CHANGE UP (ref 40–120)
ALP SERPL-CCNC: 93 U/L — SIGNIFICANT CHANGE UP (ref 40–120)
ALT FLD-CCNC: 15 U/L — SIGNIFICANT CHANGE UP (ref 12–78)
ALT FLD-CCNC: 17 U/L — SIGNIFICANT CHANGE UP (ref 12–78)
ALT FLD-CCNC: 19 U/L — SIGNIFICANT CHANGE UP (ref 12–78)
ALT FLD-CCNC: 20 U/L — SIGNIFICANT CHANGE UP (ref 12–78)
ALT FLD-CCNC: 20 U/L — SIGNIFICANT CHANGE UP (ref 12–78)
ALT FLD-CCNC: 21 U/L — SIGNIFICANT CHANGE UP (ref 12–78)
ALT FLD-CCNC: 22 U/L — SIGNIFICANT CHANGE UP (ref 12–78)
ALT FLD-CCNC: 23 U/L — SIGNIFICANT CHANGE UP (ref 12–78)
ALT FLD-CCNC: 25 U/L — SIGNIFICANT CHANGE UP (ref 12–78)
ANION GAP SERPL CALC-SCNC: 10 MMOL/L — SIGNIFICANT CHANGE UP (ref 5–17)
ANION GAP SERPL CALC-SCNC: 11 MMOL/L — SIGNIFICANT CHANGE UP (ref 5–17)
ANION GAP SERPL CALC-SCNC: 11 MMOL/L — SIGNIFICANT CHANGE UP (ref 5–17)
ANION GAP SERPL CALC-SCNC: 7 MMOL/L — SIGNIFICANT CHANGE UP (ref 5–17)
ANION GAP SERPL CALC-SCNC: 8 MMOL/L — SIGNIFICANT CHANGE UP (ref 5–17)
ANION GAP SERPL CALC-SCNC: 8 MMOL/L — SIGNIFICANT CHANGE UP (ref 5–17)
ANION GAP SERPL CALC-SCNC: 9 MMOL/L — SIGNIFICANT CHANGE UP (ref 5–17)
APTT BLD: 29.7 SEC — SIGNIFICANT CHANGE UP (ref 27.5–35.5)
APTT BLD: 31.2 SEC — SIGNIFICANT CHANGE UP (ref 27.5–35.5)
APTT BLD: 34.3 SEC — SIGNIFICANT CHANGE UP (ref 27.5–35.5)
APTT BLD: 35 SEC — SIGNIFICANT CHANGE UP (ref 27.5–35.5)
APTT BLD: 37.4 SEC — HIGH (ref 27.5–35.5)
APTT BLD: 38.6 SEC — HIGH (ref 27.5–35.5)
APTT BLD: 38.8 SEC — HIGH (ref 27.5–35.5)
APTT BLD: 42.3 SEC — HIGH (ref 27.5–35.5)
APTT BLD: 43.2 SEC — HIGH (ref 27.5–35.5)
AST SERPL-CCNC: 19 U/L — SIGNIFICANT CHANGE UP (ref 15–37)
AST SERPL-CCNC: 20 U/L — SIGNIFICANT CHANGE UP (ref 15–37)
AST SERPL-CCNC: 22 U/L — SIGNIFICANT CHANGE UP (ref 15–37)
AST SERPL-CCNC: 26 U/L — SIGNIFICANT CHANGE UP (ref 15–37)
AST SERPL-CCNC: 31 U/L — SIGNIFICANT CHANGE UP (ref 15–37)
AST SERPL-CCNC: 32 U/L — SIGNIFICANT CHANGE UP (ref 15–37)
AST SERPL-CCNC: 33 U/L — SIGNIFICANT CHANGE UP (ref 15–37)
AST SERPL-CCNC: 34 U/L — SIGNIFICANT CHANGE UP (ref 15–37)
AST SERPL-CCNC: 35 U/L — SIGNIFICANT CHANGE UP (ref 15–37)
AST SERPL-CCNC: 36 U/L — SIGNIFICANT CHANGE UP (ref 15–37)
AST SERPL-CCNC: 37 U/L — SIGNIFICANT CHANGE UP (ref 15–37)
BASE EXCESS BLDA CALC-SCNC: -5.6 MMOL/L — LOW (ref -2–2)
BASE EXCESS BLDV CALC-SCNC: -4.1 MMOL/L — LOW (ref -2–2)
BASE EXCESS BLDV CALC-SCNC: -5.5 MMOL/L — LOW (ref -2–2)
BASOPHILS # BLD AUTO: 0 K/UL — SIGNIFICANT CHANGE UP (ref 0–0.2)
BASOPHILS # BLD AUTO: 0 K/UL — SIGNIFICANT CHANGE UP (ref 0–0.2)
BASOPHILS # BLD AUTO: 0.01 K/UL — SIGNIFICANT CHANGE UP (ref 0–0.2)
BASOPHILS # BLD AUTO: 0.02 K/UL — SIGNIFICANT CHANGE UP (ref 0–0.2)
BASOPHILS # BLD AUTO: 0.03 K/UL — SIGNIFICANT CHANGE UP (ref 0–0.2)
BASOPHILS NFR BLD AUTO: 0 % — SIGNIFICANT CHANGE UP (ref 0–2)
BASOPHILS NFR BLD AUTO: 0 % — SIGNIFICANT CHANGE UP (ref 0–2)
BASOPHILS NFR BLD AUTO: 0.1 % — SIGNIFICANT CHANGE UP (ref 0–2)
BASOPHILS NFR BLD AUTO: 0.2 % — SIGNIFICANT CHANGE UP (ref 0–2)
BASOPHILS NFR BLD AUTO: 0.2 % — SIGNIFICANT CHANGE UP (ref 0–2)
BASOPHILS NFR BLD AUTO: 0.3 % — SIGNIFICANT CHANGE UP (ref 0–2)
BILIRUB SERPL-MCNC: 0.3 MG/DL — SIGNIFICANT CHANGE UP (ref 0.2–1.2)
BILIRUB SERPL-MCNC: 0.5 MG/DL — SIGNIFICANT CHANGE UP (ref 0.2–1.2)
BILIRUB SERPL-MCNC: 0.5 MG/DL — SIGNIFICANT CHANGE UP (ref 0.2–1.2)
BILIRUB SERPL-MCNC: 0.6 MG/DL — SIGNIFICANT CHANGE UP (ref 0.2–1.2)
BILIRUB SERPL-MCNC: 0.8 MG/DL — SIGNIFICANT CHANGE UP (ref 0.2–1.2)
BILIRUB SERPL-MCNC: 0.9 MG/DL — SIGNIFICANT CHANGE UP (ref 0.2–1.2)
BILIRUB SERPL-MCNC: 2 MG/DL — HIGH (ref 0.2–1.2)
BILIRUB SERPL-MCNC: 2.2 MG/DL — HIGH (ref 0.2–1.2)
BILIRUB SERPL-MCNC: 2.4 MG/DL — HIGH (ref 0.2–1.2)
BLD GP AB SCN SERPL QL: SIGNIFICANT CHANGE UP
BLOOD GAS COMMENTS ARTERIAL: SIGNIFICANT CHANGE UP
BLOOD GAS COMMENTS, VENOUS: SIGNIFICANT CHANGE UP
BLOOD GAS COMMENTS, VENOUS: SIGNIFICANT CHANGE UP
BUN SERPL-MCNC: 23 MG/DL — SIGNIFICANT CHANGE UP (ref 7–23)
BUN SERPL-MCNC: 26 MG/DL — HIGH (ref 7–23)
BUN SERPL-MCNC: 29 MG/DL — HIGH (ref 7–23)
BUN SERPL-MCNC: 35 MG/DL — HIGH (ref 7–23)
BUN SERPL-MCNC: 41 MG/DL — HIGH (ref 7–23)
BUN SERPL-MCNC: 42 MG/DL — HIGH (ref 7–23)
BUN SERPL-MCNC: 43 MG/DL — HIGH (ref 7–23)
BUN SERPL-MCNC: 43 MG/DL — HIGH (ref 7–23)
BUN SERPL-MCNC: 44 MG/DL — HIGH (ref 7–23)
BUN SERPL-MCNC: 51 MG/DL — HIGH (ref 7–23)
BUN SERPL-MCNC: 54 MG/DL — HIGH (ref 7–23)
BUN SERPL-MCNC: 60 MG/DL — HIGH (ref 7–23)
BUN SERPL-MCNC: 66 MG/DL — HIGH (ref 7–23)
BUN SERPL-MCNC: 72 MG/DL — HIGH (ref 7–23)
CALCIUM SERPL-MCNC: 7.5 MG/DL — LOW (ref 8.5–10.1)
CALCIUM SERPL-MCNC: 7.5 MG/DL — LOW (ref 8.5–10.1)
CALCIUM SERPL-MCNC: 7.6 MG/DL — LOW (ref 8.5–10.1)
CALCIUM SERPL-MCNC: 7.7 MG/DL — LOW (ref 8.5–10.1)
CALCIUM SERPL-MCNC: 7.7 MG/DL — LOW (ref 8.5–10.1)
CALCIUM SERPL-MCNC: 8.1 MG/DL — LOW (ref 8.5–10.1)
CALCIUM SERPL-MCNC: 8.1 MG/DL — LOW (ref 8.5–10.1)
CALCIUM SERPL-MCNC: 8.2 MG/DL — LOW (ref 8.5–10.1)
CALCIUM SERPL-MCNC: 8.2 MG/DL — LOW (ref 8.5–10.1)
CALCIUM SERPL-MCNC: 8.3 MG/DL — LOW (ref 8.5–10.1)
CALCIUM SERPL-MCNC: 8.4 MG/DL — LOW (ref 8.5–10.1)
CALCIUM SERPL-MCNC: 8.6 MG/DL — SIGNIFICANT CHANGE UP (ref 8.5–10.1)
CALCIUM SERPL-MCNC: 8.6 MG/DL — SIGNIFICANT CHANGE UP (ref 8.5–10.1)
CALCIUM SERPL-MCNC: 8.8 MG/DL — SIGNIFICANT CHANGE UP (ref 8.5–10.1)
CHLORIDE SERPL-SCNC: 108 MMOL/L — SIGNIFICANT CHANGE UP (ref 96–108)
CHLORIDE SERPL-SCNC: 110 MMOL/L — HIGH (ref 96–108)
CHLORIDE SERPL-SCNC: 110 MMOL/L — HIGH (ref 96–108)
CHLORIDE SERPL-SCNC: 111 MMOL/L — HIGH (ref 96–108)
CHLORIDE SERPL-SCNC: 111 MMOL/L — HIGH (ref 96–108)
CHLORIDE SERPL-SCNC: 112 MMOL/L — HIGH (ref 96–108)
CHLORIDE SERPL-SCNC: 115 MMOL/L — HIGH (ref 96–108)
CHLORIDE SERPL-SCNC: 117 MMOL/L — HIGH (ref 96–108)
CHLORIDE SERPL-SCNC: 119 MMOL/L — HIGH (ref 96–108)
CHLORIDE SERPL-SCNC: 120 MMOL/L — HIGH (ref 96–108)
CHLORIDE SERPL-SCNC: 121 MMOL/L — HIGH (ref 96–108)
CHLORIDE SERPL-SCNC: 122 MMOL/L — HIGH (ref 96–108)
CK MB BLD-MCNC: 2.9 % — SIGNIFICANT CHANGE UP (ref 0–3.5)
CK MB CFR SERPL CALC: 3.1 NG/ML — SIGNIFICANT CHANGE UP (ref 0–3.6)
CK MB CFR SERPL CALC: 3.9 NG/ML — HIGH (ref 0–3.6)
CK SERPL-CCNC: 106 U/L — SIGNIFICANT CHANGE UP (ref 26–192)
CO2 SERPL-SCNC: 17 MMOL/L — LOW (ref 22–31)
CO2 SERPL-SCNC: 18 MMOL/L — LOW (ref 22–31)
CO2 SERPL-SCNC: 18 MMOL/L — LOW (ref 22–31)
CO2 SERPL-SCNC: 19 MMOL/L — LOW (ref 22–31)
CO2 SERPL-SCNC: 20 MMOL/L — LOW (ref 22–31)
CO2 SERPL-SCNC: 22 MMOL/L — SIGNIFICANT CHANGE UP (ref 22–31)
CO2 SERPL-SCNC: 23 MMOL/L — SIGNIFICANT CHANGE UP (ref 22–31)
CO2 SERPL-SCNC: 23 MMOL/L — SIGNIFICANT CHANGE UP (ref 22–31)
CO2 SERPL-SCNC: 24 MMOL/L — SIGNIFICANT CHANGE UP (ref 22–31)
CO2 SERPL-SCNC: 24 MMOL/L — SIGNIFICANT CHANGE UP (ref 22–31)
CREAT SERPL-MCNC: 1 MG/DL — SIGNIFICANT CHANGE UP (ref 0.5–1.3)
CREAT SERPL-MCNC: 1.1 MG/DL — SIGNIFICANT CHANGE UP (ref 0.5–1.3)
CREAT SERPL-MCNC: 1.2 MG/DL — SIGNIFICANT CHANGE UP (ref 0.5–1.3)
CREAT SERPL-MCNC: 1.3 MG/DL — SIGNIFICANT CHANGE UP (ref 0.5–1.3)
CREAT SERPL-MCNC: 1.4 MG/DL — HIGH (ref 0.5–1.3)
CREAT SERPL-MCNC: 1.4 MG/DL — HIGH (ref 0.5–1.3)
CREAT SERPL-MCNC: 1.5 MG/DL — HIGH (ref 0.5–1.3)
CREAT SERPL-MCNC: 1.7 MG/DL — HIGH (ref 0.5–1.3)
CREAT SERPL-MCNC: 1.8 MG/DL — HIGH (ref 0.5–1.3)
CREAT SERPL-MCNC: 1.9 MG/DL — HIGH (ref 0.5–1.3)
CREAT SERPL-MCNC: 2.4 MG/DL — HIGH (ref 0.5–1.3)
CREAT SERPL-MCNC: 2.5 MG/DL — HIGH (ref 0.5–1.3)
CRP SERPL-MCNC: 1.63 MG/DL — HIGH (ref 0–0.4)
CRP SERPL-MCNC: 6.22 MG/DL — HIGH (ref 0–0.4)
CULTURE RESULTS: SIGNIFICANT CHANGE UP
D DIMER BLD IA.RAPID-MCNC: 777 NG/ML DDU — HIGH
EOSINOPHIL # BLD AUTO: 0 K/UL — SIGNIFICANT CHANGE UP (ref 0–0.5)
EOSINOPHIL # BLD AUTO: 0.01 K/UL — SIGNIFICANT CHANGE UP (ref 0–0.5)
EOSINOPHIL # BLD AUTO: 0.01 K/UL — SIGNIFICANT CHANGE UP (ref 0–0.5)
EOSINOPHIL # BLD AUTO: 0.02 K/UL — SIGNIFICANT CHANGE UP (ref 0–0.5)
EOSINOPHIL # BLD AUTO: 0.07 K/UL — SIGNIFICANT CHANGE UP (ref 0–0.5)
EOSINOPHIL # BLD AUTO: 0.33 K/UL — SIGNIFICANT CHANGE UP (ref 0–0.5)
EOSINOPHIL NFR BLD AUTO: 0 % — SIGNIFICANT CHANGE UP (ref 0–6)
EOSINOPHIL NFR BLD AUTO: 0.1 % — SIGNIFICANT CHANGE UP (ref 0–6)
EOSINOPHIL NFR BLD AUTO: 0.1 % — SIGNIFICANT CHANGE UP (ref 0–6)
EOSINOPHIL NFR BLD AUTO: 0.2 % — SIGNIFICANT CHANGE UP (ref 0–6)
EOSINOPHIL NFR BLD AUTO: 0.8 % — SIGNIFICANT CHANGE UP (ref 0–6)
EOSINOPHIL NFR BLD AUTO: 4.3 % — SIGNIFICANT CHANGE UP (ref 0–6)
FERRITIN SERPL-MCNC: 414 NG/ML — HIGH (ref 15–150)
FERRITIN SERPL-MCNC: 656 NG/ML — HIGH (ref 15–150)
GLUCOSE SERPL-MCNC: 107 MG/DL — HIGH (ref 70–99)
GLUCOSE SERPL-MCNC: 108 MG/DL — HIGH (ref 70–99)
GLUCOSE SERPL-MCNC: 111 MG/DL — HIGH (ref 70–99)
GLUCOSE SERPL-MCNC: 112 MG/DL — HIGH (ref 70–99)
GLUCOSE SERPL-MCNC: 116 MG/DL — HIGH (ref 70–99)
GLUCOSE SERPL-MCNC: 117 MG/DL — HIGH (ref 70–99)
GLUCOSE SERPL-MCNC: 119 MG/DL — HIGH (ref 70–99)
GLUCOSE SERPL-MCNC: 119 MG/DL — HIGH (ref 70–99)
GLUCOSE SERPL-MCNC: 127 MG/DL — HIGH (ref 70–99)
GLUCOSE SERPL-MCNC: 128 MG/DL — HIGH (ref 70–99)
GLUCOSE SERPL-MCNC: 129 MG/DL — HIGH (ref 70–99)
GLUCOSE SERPL-MCNC: 137 MG/DL — HIGH (ref 70–99)
GLUCOSE SERPL-MCNC: 89 MG/DL — SIGNIFICANT CHANGE UP (ref 70–99)
GLUCOSE SERPL-MCNC: 93 MG/DL — SIGNIFICANT CHANGE UP (ref 70–99)
HCO3 BLDA-SCNC: 20 MMOL/L — LOW (ref 23–27)
HCO3 BLDV-SCNC: 20 MMOL/L — LOW (ref 21–29)
HCO3 BLDV-SCNC: 21 MMOL/L — SIGNIFICANT CHANGE UP (ref 21–29)
HCT VFR BLD CALC: 27.8 % — LOW (ref 34.5–45)
HCT VFR BLD CALC: 27.9 % — LOW (ref 34.5–45)
HCT VFR BLD CALC: 28 % — LOW (ref 34.5–45)
HCT VFR BLD CALC: 29.4 % — LOW (ref 34.5–45)
HCT VFR BLD CALC: 29.6 % — LOW (ref 34.5–45)
HCT VFR BLD CALC: 30.4 % — LOW (ref 34.5–45)
HCT VFR BLD CALC: 30.7 % — LOW (ref 34.5–45)
HCT VFR BLD CALC: 31.7 % — LOW (ref 34.5–45)
HCT VFR BLD CALC: 32.1 % — LOW (ref 34.5–45)
HCT VFR BLD CALC: 33.1 % — LOW (ref 34.5–45)
HCT VFR BLD CALC: 33.5 % — LOW (ref 34.5–45)
HCT VFR BLD CALC: 34.5 % — SIGNIFICANT CHANGE UP (ref 34.5–45)
HCT VFR BLD CALC: 34.6 % — SIGNIFICANT CHANGE UP (ref 34.5–45)
HCT VFR BLD CALC: 37.4 % — SIGNIFICANT CHANGE UP (ref 34.5–45)
HGB BLD-MCNC: 10 G/DL — LOW (ref 11.5–15.5)
HGB BLD-MCNC: 10.1 G/DL — LOW (ref 11.5–15.5)
HGB BLD-MCNC: 10.3 G/DL — LOW (ref 11.5–15.5)
HGB BLD-MCNC: 10.4 G/DL — LOW (ref 11.5–15.5)
HGB BLD-MCNC: 10.7 G/DL — LOW (ref 11.5–15.5)
HGB BLD-MCNC: 11.2 G/DL — LOW (ref 11.5–15.5)
HGB BLD-MCNC: 11.9 G/DL — SIGNIFICANT CHANGE UP (ref 11.5–15.5)
HGB BLD-MCNC: 8.8 G/DL — LOW (ref 11.5–15.5)
HGB BLD-MCNC: 9.2 G/DL — LOW (ref 11.5–15.5)
HGB BLD-MCNC: 9.3 G/DL — LOW (ref 11.5–15.5)
HGB BLD-MCNC: 9.4 G/DL — LOW (ref 11.5–15.5)
HGB BLD-MCNC: 9.5 G/DL — LOW (ref 11.5–15.5)
HOROWITZ INDEX BLDV+IHG-RTO: 21 — SIGNIFICANT CHANGE UP
HOROWITZ INDEX BLDV+IHG-RTO: 21 — SIGNIFICANT CHANGE UP
IMM GRANULOCYTES NFR BLD AUTO: 0.4 % — SIGNIFICANT CHANGE UP (ref 0–1.5)
IMM GRANULOCYTES NFR BLD AUTO: 0.7 % — SIGNIFICANT CHANGE UP (ref 0–1.5)
IMM GRANULOCYTES NFR BLD AUTO: 0.8 % — SIGNIFICANT CHANGE UP (ref 0–1.5)
IMM GRANULOCYTES NFR BLD AUTO: 0.8 % — SIGNIFICANT CHANGE UP (ref 0–1.5)
IMM GRANULOCYTES NFR BLD AUTO: 0.9 % — SIGNIFICANT CHANGE UP (ref 0–1.5)
IMM GRANULOCYTES NFR BLD AUTO: 1.2 % — SIGNIFICANT CHANGE UP (ref 0–1.5)
INR BLD: 1.53 RATIO — HIGH (ref 0.88–1.16)
INR BLD: 1.54 RATIO — HIGH (ref 0.88–1.16)
INR BLD: 1.65 RATIO — HIGH (ref 0.88–1.16)
INR BLD: 1.66 RATIO — HIGH (ref 0.88–1.16)
INR BLD: 1.72 RATIO — HIGH (ref 0.88–1.16)
INR BLD: 1.81 RATIO — HIGH (ref 0.88–1.16)
INR BLD: 1.85 RATIO — HIGH (ref 0.88–1.16)
INR BLD: 2.47 RATIO — HIGH (ref 0.88–1.16)
INR BLD: 2.78 RATIO — HIGH (ref 0.88–1.16)
INR BLD: 2.89 RATIO — HIGH (ref 0.88–1.16)
INR BLD: 3.05 RATIO — HIGH (ref 0.88–1.16)
INR BLD: 3.24 RATIO — HIGH (ref 0.88–1.16)
INR BLD: 6.19 RATIO — CRITICAL HIGH (ref 0.88–1.16)
INR BLD: 7.01 RATIO — CRITICAL HIGH (ref 0.88–1.16)
INR PPP: 1.63 RATIO
IRON SATN MFR SERPL: 12 % — LOW (ref 14–50)
IRON SATN MFR SERPL: 24 UG/DL — LOW (ref 30–160)
LACTATE SERPL-SCNC: 1 MMOL/L — SIGNIFICANT CHANGE UP (ref 0.7–2)
LACTATE SERPL-SCNC: 1.3 MMOL/L — SIGNIFICANT CHANGE UP (ref 0.7–2)
LACTATE SERPL-SCNC: 2 MMOL/L — SIGNIFICANT CHANGE UP (ref 0.7–2)
LACTATE SERPL-SCNC: 2.7 MMOL/L — HIGH (ref 0.7–2)
LEGIONELLA AG UR QL: NEGATIVE — SIGNIFICANT CHANGE UP
LYMPHOCYTES # BLD AUTO: 0.23 K/UL — LOW (ref 1–3.3)
LYMPHOCYTES # BLD AUTO: 0.27 K/UL — LOW (ref 1–3.3)
LYMPHOCYTES # BLD AUTO: 0.29 K/UL — LOW (ref 1–3.3)
LYMPHOCYTES # BLD AUTO: 0.5 K/UL — LOW (ref 1–3.3)
LYMPHOCYTES # BLD AUTO: 0.53 K/UL — LOW (ref 1–3.3)
LYMPHOCYTES # BLD AUTO: 0.56 K/UL — LOW (ref 1–3.3)
LYMPHOCYTES # BLD AUTO: 0.58 K/UL — LOW (ref 1–3.3)
LYMPHOCYTES # BLD AUTO: 0.67 K/UL — LOW (ref 1–3.3)
LYMPHOCYTES # BLD AUTO: 0.7 K/UL — LOW (ref 1–3.3)
LYMPHOCYTES # BLD AUTO: 0.9 K/UL — LOW (ref 1–3.3)
LYMPHOCYTES # BLD AUTO: 1 % — LOW (ref 13–44)
LYMPHOCYTES # BLD AUTO: 1.6 % — LOW (ref 13–44)
LYMPHOCYTES # BLD AUTO: 1.8 % — LOW (ref 13–44)
LYMPHOCYTES # BLD AUTO: 11.8 % — LOW (ref 13–44)
LYMPHOCYTES # BLD AUTO: 3.1 % — LOW (ref 13–44)
LYMPHOCYTES # BLD AUTO: 3.9 % — LOW (ref 13–44)
LYMPHOCYTES # BLD AUTO: 4.4 % — LOW (ref 13–44)
LYMPHOCYTES # BLD AUTO: 5.4 % — LOW (ref 13–44)
LYMPHOCYTES # BLD AUTO: 5.7 % — LOW (ref 13–44)
LYMPHOCYTES # BLD AUTO: 7.3 % — LOW (ref 13–44)
MAGNESIUM SERPL-MCNC: 1.9 MG/DL — SIGNIFICANT CHANGE UP (ref 1.6–2.6)
MAGNESIUM SERPL-MCNC: 2 MG/DL — SIGNIFICANT CHANGE UP (ref 1.6–2.6)
MCHC RBC-ENTMCNC: 27 PG — SIGNIFICANT CHANGE UP (ref 27–34)
MCHC RBC-ENTMCNC: 27.3 PG — SIGNIFICANT CHANGE UP (ref 27–34)
MCHC RBC-ENTMCNC: 27.4 PG — SIGNIFICANT CHANGE UP (ref 27–34)
MCHC RBC-ENTMCNC: 27.4 PG — SIGNIFICANT CHANGE UP (ref 27–34)
MCHC RBC-ENTMCNC: 27.5 PG — SIGNIFICANT CHANGE UP (ref 27–34)
MCHC RBC-ENTMCNC: 27.8 PG — SIGNIFICANT CHANGE UP (ref 27–34)
MCHC RBC-ENTMCNC: 27.8 PG — SIGNIFICANT CHANGE UP (ref 27–34)
MCHC RBC-ENTMCNC: 28.1 PG — SIGNIFICANT CHANGE UP (ref 27–34)
MCHC RBC-ENTMCNC: 28.3 PG — SIGNIFICANT CHANGE UP (ref 27–34)
MCHC RBC-ENTMCNC: 30.6 GM/DL — LOW (ref 32–36)
MCHC RBC-ENTMCNC: 30.7 GM/DL — LOW (ref 32–36)
MCHC RBC-ENTMCNC: 31 GM/DL — LOW (ref 32–36)
MCHC RBC-ENTMCNC: 31.3 GM/DL — LOW (ref 32–36)
MCHC RBC-ENTMCNC: 31.3 GM/DL — LOW (ref 32–36)
MCHC RBC-ENTMCNC: 31.4 GM/DL — LOW (ref 32–36)
MCHC RBC-ENTMCNC: 31.5 GM/DL — LOW (ref 32–36)
MCHC RBC-ENTMCNC: 31.7 GM/DL — LOW (ref 32–36)
MCHC RBC-ENTMCNC: 31.8 GM/DL — LOW (ref 32–36)
MCHC RBC-ENTMCNC: 32.4 GM/DL — SIGNIFICANT CHANGE UP (ref 32–36)
MCV RBC AUTO: 85.9 FL — SIGNIFICANT CHANGE UP (ref 80–100)
MCV RBC AUTO: 86.7 FL — SIGNIFICANT CHANGE UP (ref 80–100)
MCV RBC AUTO: 87.1 FL — SIGNIFICANT CHANGE UP (ref 80–100)
MCV RBC AUTO: 87.2 FL — SIGNIFICANT CHANGE UP (ref 80–100)
MCV RBC AUTO: 87.2 FL — SIGNIFICANT CHANGE UP (ref 80–100)
MCV RBC AUTO: 87.3 FL — SIGNIFICANT CHANGE UP (ref 80–100)
MCV RBC AUTO: 87.4 FL — SIGNIFICANT CHANGE UP (ref 80–100)
MCV RBC AUTO: 87.6 FL — SIGNIFICANT CHANGE UP (ref 80–100)
MCV RBC AUTO: 88 FL — SIGNIFICANT CHANGE UP (ref 80–100)
MCV RBC AUTO: 88 FL — SIGNIFICANT CHANGE UP (ref 80–100)
MCV RBC AUTO: 88.7 FL — SIGNIFICANT CHANGE UP (ref 80–100)
MCV RBC AUTO: 88.8 FL — SIGNIFICANT CHANGE UP (ref 80–100)
MCV RBC AUTO: 89.2 FL — SIGNIFICANT CHANGE UP (ref 80–100)
MCV RBC AUTO: 89.6 FL — SIGNIFICANT CHANGE UP (ref 80–100)
MONOCYTES # BLD AUTO: 0.33 K/UL — SIGNIFICANT CHANGE UP (ref 0–0.9)
MONOCYTES # BLD AUTO: 0.39 K/UL — SIGNIFICANT CHANGE UP (ref 0–0.9)
MONOCYTES # BLD AUTO: 0.44 K/UL — SIGNIFICANT CHANGE UP (ref 0–0.9)
MONOCYTES # BLD AUTO: 0.45 K/UL — SIGNIFICANT CHANGE UP (ref 0–0.9)
MONOCYTES # BLD AUTO: 0.46 K/UL — SIGNIFICANT CHANGE UP (ref 0–0.9)
MONOCYTES # BLD AUTO: 0.56 K/UL — SIGNIFICANT CHANGE UP (ref 0–0.9)
MONOCYTES # BLD AUTO: 0.59 K/UL — SIGNIFICANT CHANGE UP (ref 0–0.9)
MONOCYTES # BLD AUTO: 0.89 K/UL — SIGNIFICANT CHANGE UP (ref 0–0.9)
MONOCYTES # BLD AUTO: 0.91 K/UL — HIGH (ref 0–0.9)
MONOCYTES # BLD AUTO: 0.95 K/UL — HIGH (ref 0–0.9)
MONOCYTES NFR BLD AUTO: 10 % — SIGNIFICANT CHANGE UP (ref 2–14)
MONOCYTES NFR BLD AUTO: 12.5 % — SIGNIFICANT CHANGE UP (ref 2–14)
MONOCYTES NFR BLD AUTO: 2 % — SIGNIFICANT CHANGE UP (ref 2–14)
MONOCYTES NFR BLD AUTO: 2.1 % — SIGNIFICANT CHANGE UP (ref 2–14)
MONOCYTES NFR BLD AUTO: 2.5 % — SIGNIFICANT CHANGE UP (ref 2–14)
MONOCYTES NFR BLD AUTO: 2.9 % — SIGNIFICANT CHANGE UP (ref 2–14)
MONOCYTES NFR BLD AUTO: 3.8 % — SIGNIFICANT CHANGE UP (ref 2–14)
MONOCYTES NFR BLD AUTO: 4.1 % — SIGNIFICANT CHANGE UP (ref 2–14)
MONOCYTES NFR BLD AUTO: 4.1 % — SIGNIFICANT CHANGE UP (ref 2–14)
MONOCYTES NFR BLD AUTO: 7.2 % — SIGNIFICANT CHANGE UP (ref 2–14)
MRSA PCR RESULT.: SIGNIFICANT CHANGE UP
NEUTROPHILS # BLD AUTO: 10.35 K/UL — HIGH (ref 1.8–7.4)
NEUTROPHILS # BLD AUTO: 10.65 K/UL — HIGH (ref 1.8–7.4)
NEUTROPHILS # BLD AUTO: 12.35 K/UL — HIGH (ref 1.8–7.4)
NEUTROPHILS # BLD AUTO: 14.32 K/UL — HIGH (ref 1.8–7.4)
NEUTROPHILS # BLD AUTO: 16.89 K/UL — HIGH (ref 1.8–7.4)
NEUTROPHILS # BLD AUTO: 17.65 K/UL — HIGH (ref 1.8–7.4)
NEUTROPHILS # BLD AUTO: 22.2 K/UL — HIGH (ref 1.8–7.4)
NEUTROPHILS # BLD AUTO: 5.4 K/UL — SIGNIFICANT CHANGE UP (ref 1.8–7.4)
NEUTROPHILS # BLD AUTO: 7.44 K/UL — HIGH (ref 1.8–7.4)
NEUTROPHILS # BLD AUTO: 9.7 K/UL — HIGH (ref 1.8–7.4)
NEUTROPHILS NFR BLD AUTO: 70.7 % — SIGNIFICANT CHANGE UP (ref 43–77)
NEUTROPHILS NFR BLD AUTO: 81.4 % — HIGH (ref 43–77)
NEUTROPHILS NFR BLD AUTO: 86.3 % — HIGH (ref 43–77)
NEUTROPHILS NFR BLD AUTO: 89.9 % — HIGH (ref 43–77)
NEUTROPHILS NFR BLD AUTO: 91 % — HIGH (ref 43–77)
NEUTROPHILS NFR BLD AUTO: 91.9 % — HIGH (ref 43–77)
NEUTROPHILS NFR BLD AUTO: 93 % — HIGH (ref 43–77)
NEUTROPHILS NFR BLD AUTO: 93.1 % — HIGH (ref 43–77)
NEUTROPHILS NFR BLD AUTO: 93.4 % — HIGH (ref 43–77)
NEUTROPHILS NFR BLD AUTO: 95.4 % — HIGH (ref 43–77)
NRBC # BLD: 0 /100 WBCS — SIGNIFICANT CHANGE UP (ref 0–0)
NRBC # BLD: SIGNIFICANT CHANGE UP /100 WBCS (ref 0–0)
NT-PROBNP SERPL-SCNC: 7272 PG/ML — HIGH (ref 0–450)
NT-PROBNP SERPL-SCNC: HIGH PG/ML (ref 0–450)
NT-PROBNP SERPL-SCNC: HIGH PG/ML (ref 0–450)
PCO2 BLDA: 25 MMHG — LOW (ref 32–46)
PCO2 BLDV: 30 MMHG — LOW (ref 35–50)
PCO2 BLDV: 36 MMHG — SIGNIFICANT CHANGE UP (ref 35–50)
PH BLDA: 7.46 — HIGH (ref 7.35–7.45)
PH BLDV: 7.37 — SIGNIFICANT CHANGE UP (ref 7.35–7.45)
PH BLDV: 7.4 — SIGNIFICANT CHANGE UP (ref 7.35–7.45)
PHOSPHATE SERPL-MCNC: 2.5 MG/DL — SIGNIFICANT CHANGE UP (ref 2.5–4.5)
PLATELET # BLD AUTO: 160 K/UL — SIGNIFICANT CHANGE UP (ref 150–400)
PLATELET # BLD AUTO: 177 K/UL — SIGNIFICANT CHANGE UP (ref 150–400)
PLATELET # BLD AUTO: 188 K/UL — SIGNIFICANT CHANGE UP (ref 150–400)
PLATELET # BLD AUTO: 189 K/UL — SIGNIFICANT CHANGE UP (ref 150–400)
PLATELET # BLD AUTO: 203 K/UL — SIGNIFICANT CHANGE UP (ref 150–400)
PLATELET # BLD AUTO: 209 K/UL — SIGNIFICANT CHANGE UP (ref 150–400)
PLATELET # BLD AUTO: 212 K/UL — SIGNIFICANT CHANGE UP (ref 150–400)
PLATELET # BLD AUTO: 213 K/UL — SIGNIFICANT CHANGE UP (ref 150–400)
PLATELET # BLD AUTO: 216 K/UL — SIGNIFICANT CHANGE UP (ref 150–400)
PLATELET # BLD AUTO: 223 K/UL — SIGNIFICANT CHANGE UP (ref 150–400)
PLATELET # BLD AUTO: 225 K/UL — SIGNIFICANT CHANGE UP (ref 150–400)
PLATELET # BLD AUTO: 230 K/UL — SIGNIFICANT CHANGE UP (ref 150–400)
PLATELET # BLD AUTO: 245 K/UL — SIGNIFICANT CHANGE UP (ref 150–400)
PLATELET # BLD AUTO: 260 K/UL — SIGNIFICANT CHANGE UP (ref 150–400)
PO2 BLDA: 113 MMHG — HIGH (ref 74–108)
PO2 BLDV: 205 MMHG — HIGH (ref 25–45)
PO2 BLDV: 65 MMHG — HIGH (ref 25–45)
POTASSIUM SERPL-MCNC: 3.3 MMOL/L — LOW (ref 3.5–5.3)
POTASSIUM SERPL-MCNC: 3.4 MMOL/L — LOW (ref 3.5–5.3)
POTASSIUM SERPL-MCNC: 3.5 MMOL/L — SIGNIFICANT CHANGE UP (ref 3.5–5.3)
POTASSIUM SERPL-MCNC: 3.5 MMOL/L — SIGNIFICANT CHANGE UP (ref 3.5–5.3)
POTASSIUM SERPL-MCNC: 3.6 MMOL/L — SIGNIFICANT CHANGE UP (ref 3.5–5.3)
POTASSIUM SERPL-MCNC: 3.7 MMOL/L — SIGNIFICANT CHANGE UP (ref 3.5–5.3)
POTASSIUM SERPL-MCNC: 3.7 MMOL/L — SIGNIFICANT CHANGE UP (ref 3.5–5.3)
POTASSIUM SERPL-MCNC: 3.9 MMOL/L — SIGNIFICANT CHANGE UP (ref 3.5–5.3)
POTASSIUM SERPL-MCNC: 4 MMOL/L — SIGNIFICANT CHANGE UP (ref 3.5–5.3)
POTASSIUM SERPL-MCNC: 4.1 MMOL/L — SIGNIFICANT CHANGE UP (ref 3.5–5.3)
POTASSIUM SERPL-MCNC: 4.1 MMOL/L — SIGNIFICANT CHANGE UP (ref 3.5–5.3)
POTASSIUM SERPL-MCNC: 4.6 MMOL/L — SIGNIFICANT CHANGE UP (ref 3.5–5.3)
POTASSIUM SERPL-SCNC: 3.3 MMOL/L — LOW (ref 3.5–5.3)
POTASSIUM SERPL-SCNC: 3.4 MMOL/L — LOW (ref 3.5–5.3)
POTASSIUM SERPL-SCNC: 3.5 MMOL/L — SIGNIFICANT CHANGE UP (ref 3.5–5.3)
POTASSIUM SERPL-SCNC: 3.5 MMOL/L — SIGNIFICANT CHANGE UP (ref 3.5–5.3)
POTASSIUM SERPL-SCNC: 3.6 MMOL/L — SIGNIFICANT CHANGE UP (ref 3.5–5.3)
POTASSIUM SERPL-SCNC: 3.7 MMOL/L — SIGNIFICANT CHANGE UP (ref 3.5–5.3)
POTASSIUM SERPL-SCNC: 3.7 MMOL/L — SIGNIFICANT CHANGE UP (ref 3.5–5.3)
POTASSIUM SERPL-SCNC: 3.9 MMOL/L — SIGNIFICANT CHANGE UP (ref 3.5–5.3)
POTASSIUM SERPL-SCNC: 4 MMOL/L — SIGNIFICANT CHANGE UP (ref 3.5–5.3)
POTASSIUM SERPL-SCNC: 4.1 MMOL/L — SIGNIFICANT CHANGE UP (ref 3.5–5.3)
POTASSIUM SERPL-SCNC: 4.1 MMOL/L — SIGNIFICANT CHANGE UP (ref 3.5–5.3)
POTASSIUM SERPL-SCNC: 4.6 MMOL/L — SIGNIFICANT CHANGE UP (ref 3.5–5.3)
PROCALCITONIN SERPL-MCNC: 1.02 NG/ML — HIGH (ref 0–0.04)
PROT SERPL-MCNC: 5.8 G/DL — LOW (ref 6–8.3)
PROT SERPL-MCNC: 5.9 G/DL — LOW (ref 6–8.3)
PROT SERPL-MCNC: 6 G/DL — SIGNIFICANT CHANGE UP (ref 6–8.3)
PROT SERPL-MCNC: 6.1 G/DL — SIGNIFICANT CHANGE UP (ref 6–8.3)
PROT SERPL-MCNC: 6.2 G/DL — SIGNIFICANT CHANGE UP (ref 6–8.3)
PROT SERPL-MCNC: 6.2 G/DL — SIGNIFICANT CHANGE UP (ref 6–8.3)
PROT SERPL-MCNC: 6.5 G/DL — SIGNIFICANT CHANGE UP (ref 6–8.3)
PROT SERPL-MCNC: 7.6 G/DL — SIGNIFICANT CHANGE UP (ref 6–8.3)
PROTHROM AB SERPL-ACNC: 17.5 SEC — HIGH (ref 10.6–13.6)
PROTHROM AB SERPL-ACNC: 17.7 SEC — HIGH (ref 10.6–13.6)
PROTHROM AB SERPL-ACNC: 18.8 SEC — HIGH (ref 10.6–13.6)
PROTHROM AB SERPL-ACNC: 19 SEC — HIGH (ref 10.6–13.6)
PROTHROM AB SERPL-ACNC: 19.6 SEC — HIGH (ref 10.6–13.6)
PROTHROM AB SERPL-ACNC: 20.6 SEC — HIGH (ref 10.6–13.6)
PROTHROM AB SERPL-ACNC: 21 SEC — HIGH (ref 10.6–13.6)
PROTHROM AB SERPL-ACNC: 27.7 SEC — HIGH (ref 10.6–13.6)
PROTHROM AB SERPL-ACNC: 31 SEC — HIGH (ref 10.6–13.6)
PROTHROM AB SERPL-ACNC: 32.1 SEC — HIGH (ref 10.6–13.6)
PROTHROM AB SERPL-ACNC: 33.8 SEC — HIGH (ref 10.6–13.6)
PROTHROM AB SERPL-ACNC: 35.8 SEC — HIGH (ref 10.6–13.6)
PROTHROM AB SERPL-ACNC: 66.4 SEC — HIGH (ref 10.6–13.6)
PROTHROM AB SERPL-ACNC: 74.8 SEC — HIGH (ref 10.6–13.6)
PT BLD: 19 SEC
RBC # BLD: 3.16 M/UL — LOW (ref 3.8–5.2)
RBC # BLD: 3.17 M/UL — LOW (ref 3.8–5.2)
RBC # BLD: 3.26 M/UL — LOW (ref 3.8–5.2)
RBC # BLD: 3.37 M/UL — LOW (ref 3.8–5.2)
RBC # BLD: 3.39 M/UL — LOW (ref 3.8–5.2)
RBC # BLD: 3.44 M/UL — LOW (ref 3.8–5.2)
RBC # BLD: 3.48 M/UL — LOW (ref 3.8–5.2)
RBC # BLD: 3.64 M/UL — LOW (ref 3.8–5.2)
RBC # BLD: 3.68 M/UL — LOW (ref 3.8–5.2)
RBC # BLD: 3.74 M/UL — LOW (ref 3.8–5.2)
RBC # BLD: 3.78 M/UL — LOW (ref 3.8–5.2)
RBC # BLD: 3.89 M/UL — SIGNIFICANT CHANGE UP (ref 3.8–5.2)
RBC # BLD: 3.99 M/UL — SIGNIFICANT CHANGE UP (ref 3.8–5.2)
RBC # BLD: 4.21 M/UL — SIGNIFICANT CHANGE UP (ref 3.8–5.2)
RBC # FLD: 16.7 % — HIGH (ref 10.3–14.5)
RBC # FLD: 16.7 % — HIGH (ref 10.3–14.5)
RBC # FLD: 16.8 % — HIGH (ref 10.3–14.5)
RBC # FLD: 17.2 % — HIGH (ref 10.3–14.5)
RBC # FLD: 17.3 % — HIGH (ref 10.3–14.5)
RBC # FLD: 17.4 % — HIGH (ref 10.3–14.5)
RBC # FLD: 17.4 % — HIGH (ref 10.3–14.5)
RBC # FLD: 17.7 % — HIGH (ref 10.3–14.5)
RBC # FLD: 18 % — HIGH (ref 10.3–14.5)
S AUREUS DNA NOSE QL NAA+PROBE: DETECTED
S PNEUM AG UR QL: NEGATIVE — SIGNIFICANT CHANGE UP
SAO2 % BLDA: 98 % — HIGH (ref 92–96)
SAO2 % BLDV: 90 % — HIGH (ref 67–88)
SAO2 % BLDV: 99 % — HIGH (ref 67–88)
SARS-COV-2 IGG SERPL QL IA: NEGATIVE — SIGNIFICANT CHANGE UP
SARS-COV-2 IGG SERPL QL IA: NEGATIVE — SIGNIFICANT CHANGE UP
SARS-COV-2 IGM SERPL IA-ACNC: 0.06 INDEX — SIGNIFICANT CHANGE UP
SARS-COV-2 IGM SERPL IA-ACNC: <0.1 INDEX — SIGNIFICANT CHANGE UP
SARS-COV-2 RNA SPEC QL NAA+PROBE: DETECTED
SARS-COV-2 RNA SPEC QL NAA+PROBE: SIGNIFICANT CHANGE UP
SARS-COV-2 RNA SPEC QL NAA+PROBE: SIGNIFICANT CHANGE UP
SODIUM SERPL-SCNC: 138 MMOL/L — SIGNIFICANT CHANGE UP (ref 135–145)
SODIUM SERPL-SCNC: 141 MMOL/L — SIGNIFICANT CHANGE UP (ref 135–145)
SODIUM SERPL-SCNC: 142 MMOL/L — SIGNIFICANT CHANGE UP (ref 135–145)
SODIUM SERPL-SCNC: 144 MMOL/L — SIGNIFICANT CHANGE UP (ref 135–145)
SODIUM SERPL-SCNC: 144 MMOL/L — SIGNIFICANT CHANGE UP (ref 135–145)
SODIUM SERPL-SCNC: 146 MMOL/L — HIGH (ref 135–145)
SODIUM SERPL-SCNC: 148 MMOL/L — HIGH (ref 135–145)
SODIUM SERPL-SCNC: 148 MMOL/L — HIGH (ref 135–145)
SODIUM SERPL-SCNC: 149 MMOL/L — HIGH (ref 135–145)
SODIUM SERPL-SCNC: 150 MMOL/L — HIGH (ref 135–145)
SPECIMEN SOURCE: SIGNIFICANT CHANGE UP
TIBC SERPL-MCNC: 203 UG/DL — LOW (ref 220–430)
TROPONIN I SERPL-MCNC: 0.03 NG/ML — SIGNIFICANT CHANGE UP (ref 0.01–0.04)
TROPONIN I SERPL-MCNC: 0.03 NG/ML — SIGNIFICANT CHANGE UP (ref 0.01–0.04)
TSH SERPL-MCNC: 2.41 UIU/ML — SIGNIFICANT CHANGE UP (ref 0.36–3.74)
UIBC SERPL-MCNC: 179 UG/DL — SIGNIFICANT CHANGE UP (ref 110–370)
VIT B12 SERPL-MCNC: 696 PG/ML — SIGNIFICANT CHANGE UP (ref 232–1245)
WBC # BLD: 10.78 K/UL — HIGH (ref 3.8–10.5)
WBC # BLD: 11.27 K/UL — HIGH (ref 3.8–10.5)
WBC # BLD: 12.34 K/UL — HIGH (ref 3.8–10.5)
WBC # BLD: 13.57 K/UL — HIGH (ref 3.8–10.5)
WBC # BLD: 15.33 K/UL — HIGH (ref 3.8–10.5)
WBC # BLD: 16.47 K/UL — HIGH (ref 3.8–10.5)
WBC # BLD: 18.13 K/UL — HIGH (ref 3.8–10.5)
WBC # BLD: 18.49 K/UL — HIGH (ref 3.8–10.5)
WBC # BLD: 20.51 K/UL — HIGH (ref 3.8–10.5)
WBC # BLD: 20.76 K/UL — HIGH (ref 3.8–10.5)
WBC # BLD: 23.13 K/UL — HIGH (ref 3.8–10.5)
WBC # BLD: 23.67 K/UL — HIGH (ref 3.8–10.5)
WBC # BLD: 7.63 K/UL — SIGNIFICANT CHANGE UP (ref 3.8–10.5)
WBC # BLD: 9.14 K/UL — SIGNIFICANT CHANGE UP (ref 3.8–10.5)
WBC # FLD AUTO: 10.78 K/UL — HIGH (ref 3.8–10.5)
WBC # FLD AUTO: 11.27 K/UL — HIGH (ref 3.8–10.5)
WBC # FLD AUTO: 12.34 K/UL — HIGH (ref 3.8–10.5)
WBC # FLD AUTO: 13.57 K/UL — HIGH (ref 3.8–10.5)
WBC # FLD AUTO: 15.33 K/UL — HIGH (ref 3.8–10.5)
WBC # FLD AUTO: 16.47 K/UL — HIGH (ref 3.8–10.5)
WBC # FLD AUTO: 18.13 K/UL — HIGH (ref 3.8–10.5)
WBC # FLD AUTO: 18.49 K/UL — HIGH (ref 3.8–10.5)
WBC # FLD AUTO: 20.51 K/UL — HIGH (ref 3.8–10.5)
WBC # FLD AUTO: 20.76 K/UL — HIGH (ref 3.8–10.5)
WBC # FLD AUTO: 23.13 K/UL — HIGH (ref 3.8–10.5)
WBC # FLD AUTO: 23.67 K/UL — HIGH (ref 3.8–10.5)
WBC # FLD AUTO: 7.63 K/UL — SIGNIFICANT CHANGE UP (ref 3.8–10.5)
WBC # FLD AUTO: 9.14 K/UL — SIGNIFICANT CHANGE UP (ref 3.8–10.5)

## 2021-01-01 PROCEDURE — 99233 SBSQ HOSP IP/OBS HIGH 50: CPT | Mod: CS,25

## 2021-01-01 PROCEDURE — 93306 TTE W/DOPPLER COMPLETE: CPT | Mod: 26

## 2021-01-01 PROCEDURE — 99233 SBSQ HOSP IP/OBS HIGH 50: CPT

## 2021-01-01 PROCEDURE — 99223 1ST HOSP IP/OBS HIGH 75: CPT | Mod: CS

## 2021-01-01 PROCEDURE — 82803 BLOOD GASES ANY COMBINATION: CPT

## 2021-01-01 PROCEDURE — 74176 CT ABD & PELVIS W/O CONTRAST: CPT

## 2021-01-01 PROCEDURE — 99222 1ST HOSP IP/OBS MODERATE 55: CPT

## 2021-01-01 PROCEDURE — 99232 SBSQ HOSP IP/OBS MODERATE 35: CPT

## 2021-01-01 PROCEDURE — 97116 GAIT TRAINING THERAPY: CPT

## 2021-01-01 PROCEDURE — 93005 ELECTROCARDIOGRAM TRACING: CPT

## 2021-01-01 PROCEDURE — 97162 PT EVAL MOD COMPLEX 30 MIN: CPT

## 2021-01-01 PROCEDURE — 99232 SBSQ HOSP IP/OBS MODERATE 35: CPT | Mod: CS

## 2021-01-01 PROCEDURE — 83880 ASSAY OF NATRIURETIC PEPTIDE: CPT

## 2021-01-01 PROCEDURE — 97110 THERAPEUTIC EXERCISES: CPT

## 2021-01-01 PROCEDURE — 82728 ASSAY OF FERRITIN: CPT

## 2021-01-01 PROCEDURE — 82607 VITAMIN B-12: CPT

## 2021-01-01 PROCEDURE — 84145 PROCALCITONIN (PCT): CPT

## 2021-01-01 PROCEDURE — 87040 BLOOD CULTURE FOR BACTERIA: CPT

## 2021-01-01 PROCEDURE — 70450 CT HEAD/BRAIN W/O DYE: CPT

## 2021-01-01 PROCEDURE — 99285 EMERGENCY DEPT VISIT HI MDM: CPT

## 2021-01-01 PROCEDURE — 78226 HEPATOBILIARY SYSTEM IMAGING: CPT

## 2021-01-01 PROCEDURE — 83540 ASSAY OF IRON: CPT

## 2021-01-01 PROCEDURE — 71275 CT ANGIOGRAPHY CHEST: CPT

## 2021-01-01 PROCEDURE — 83550 IRON BINDING TEST: CPT

## 2021-01-01 PROCEDURE — 99233 SBSQ HOSP IP/OBS HIGH 50: CPT | Mod: GC

## 2021-01-01 PROCEDURE — 78226 HEPATOBILIARY SYSTEM IMAGING: CPT | Mod: 26

## 2021-01-01 PROCEDURE — 71045 X-RAY EXAM CHEST 1 VIEW: CPT | Mod: 26

## 2021-01-01 PROCEDURE — 83735 ASSAY OF MAGNESIUM: CPT

## 2021-01-01 PROCEDURE — 87641 MR-STAPH DNA AMP PROBE: CPT

## 2021-01-01 PROCEDURE — 96374 THER/PROPH/DIAG INJ IV PUSH: CPT

## 2021-01-01 PROCEDURE — 84443 ASSAY THYROID STIM HORMONE: CPT

## 2021-01-01 PROCEDURE — 71275 CT ANGIOGRAPHY CHEST: CPT | Mod: 26

## 2021-01-01 PROCEDURE — 87449 NOS EACH ORGANISM AG IA: CPT

## 2021-01-01 PROCEDURE — 36415 COLL VENOUS BLD VENIPUNCTURE: CPT

## 2021-01-01 PROCEDURE — 93010 ELECTROCARDIOGRAM REPORT: CPT

## 2021-01-01 PROCEDURE — 86769 SARS-COV-2 COVID-19 ANTIBODY: CPT

## 2021-01-01 PROCEDURE — U0005: CPT

## 2021-01-01 PROCEDURE — 99223 1ST HOSP IP/OBS HIGH 75: CPT

## 2021-01-01 PROCEDURE — 85610 PROTHROMBIN TIME: CPT

## 2021-01-01 PROCEDURE — 99223 1ST HOSP IP/OBS HIGH 75: CPT | Mod: CS,GC,AI

## 2021-01-01 PROCEDURE — U0003: CPT

## 2021-01-01 PROCEDURE — 86900 BLOOD TYPING SEROLOGIC ABO: CPT

## 2021-01-01 PROCEDURE — 87640 STAPH A DNA AMP PROBE: CPT

## 2021-01-01 PROCEDURE — 86901 BLOOD TYPING SEROLOGIC RH(D): CPT

## 2021-01-01 PROCEDURE — 99233 SBSQ HOSP IP/OBS HIGH 50: CPT | Mod: CS

## 2021-01-01 PROCEDURE — G1004: CPT

## 2021-01-01 PROCEDURE — 84484 ASSAY OF TROPONIN QUANT: CPT

## 2021-01-01 PROCEDURE — 85730 THROMBOPLASTIN TIME PARTIAL: CPT

## 2021-01-01 PROCEDURE — 71045 X-RAY EXAM CHEST 1 VIEW: CPT

## 2021-01-01 PROCEDURE — 99497 ADVNCD CARE PLAN 30 MIN: CPT | Mod: CS

## 2021-01-01 PROCEDURE — 84100 ASSAY OF PHOSPHORUS: CPT

## 2021-01-01 PROCEDURE — 74177 CT ABD & PELVIS W/CONTRAST: CPT | Mod: 26

## 2021-01-01 PROCEDURE — 86140 C-REACTIVE PROTEIN: CPT

## 2021-01-01 PROCEDURE — 80053 COMPREHEN METABOLIC PANEL: CPT

## 2021-01-01 PROCEDURE — 85027 COMPLETE CBC AUTOMATED: CPT

## 2021-01-01 PROCEDURE — 94640 AIRWAY INHALATION TREATMENT: CPT

## 2021-01-01 PROCEDURE — 99239 HOSP IP/OBS DSCHRG MGMT >30: CPT

## 2021-01-01 PROCEDURE — 97530 THERAPEUTIC ACTIVITIES: CPT

## 2021-01-01 PROCEDURE — 86850 RBC ANTIBODY SCREEN: CPT

## 2021-01-01 PROCEDURE — 85379 FIBRIN DEGRADATION QUANT: CPT

## 2021-01-01 PROCEDURE — 99284 EMERGENCY DEPT VISIT MOD MDM: CPT | Mod: CS

## 2021-01-01 PROCEDURE — 85025 COMPLETE CBC W/AUTO DIFF WBC: CPT

## 2021-01-01 PROCEDURE — 83605 ASSAY OF LACTIC ACID: CPT

## 2021-01-01 PROCEDURE — 83690 ASSAY OF LIPASE: CPT

## 2021-01-01 PROCEDURE — 82553 CREATINE MB FRACTION: CPT

## 2021-01-01 PROCEDURE — 80048 BASIC METABOLIC PNL TOTAL CA: CPT

## 2021-01-01 PROCEDURE — 74176 CT ABD & PELVIS W/O CONTRAST: CPT | Mod: 26,MA

## 2021-01-01 PROCEDURE — 93306 TTE W/DOPPLER COMPLETE: CPT

## 2021-01-01 PROCEDURE — 71250 CT THORAX DX C-: CPT

## 2021-01-01 PROCEDURE — 87899 AGENT NOS ASSAY W/OPTIC: CPT

## 2021-01-01 PROCEDURE — 99497 ADVNCD CARE PLAN 30 MIN: CPT | Mod: CS,25

## 2021-01-01 PROCEDURE — 99222 1ST HOSP IP/OBS MODERATE 55: CPT | Mod: CS,25

## 2021-01-01 PROCEDURE — 70450 CT HEAD/BRAIN W/O DYE: CPT | Mod: 26,MG

## 2021-01-01 PROCEDURE — 99223 1ST HOSP IP/OBS HIGH 75: CPT | Mod: GC

## 2021-01-01 PROCEDURE — 70450 CT HEAD/BRAIN W/O DYE: CPT | Mod: 26

## 2021-01-01 PROCEDURE — 71250 CT THORAX DX C-: CPT | Mod: 26,MG

## 2021-01-01 PROCEDURE — A9537: CPT

## 2021-01-01 PROCEDURE — 82550 ASSAY OF CK (CPK): CPT

## 2021-01-01 PROCEDURE — 74177 CT ABD & PELVIS W/CONTRAST: CPT

## 2021-01-01 RX ORDER — WARFARIN SODIUM 2.5 MG/1
1 TABLET ORAL
Qty: 0 | Refills: 0 | DISCHARGE

## 2021-01-01 RX ORDER — ACETAMINOPHEN 500 MG
650 TABLET ORAL ONCE
Refills: 0 | Status: COMPLETED | OUTPATIENT
Start: 2021-01-01 | End: 2021-01-01

## 2021-01-01 RX ORDER — ASPIRIN/CALCIUM CARB/MAGNESIUM 324 MG
81 TABLET ORAL DAILY
Refills: 0 | Status: DISCONTINUED | OUTPATIENT
Start: 2021-01-01 | End: 2021-01-01

## 2021-01-01 RX ORDER — SODIUM CHLORIDE 0.65 %
1 AEROSOL, SPRAY (ML) NASAL THREE TIMES A DAY
Refills: 0 | Status: COMPLETED | OUTPATIENT
Start: 2021-01-01 | End: 2021-01-01

## 2021-01-01 RX ORDER — ATORVASTATIN CALCIUM 80 MG/1
40 TABLET, FILM COATED ORAL AT BEDTIME
Refills: 0 | Status: DISCONTINUED | OUTPATIENT
Start: 2021-01-01 | End: 2021-01-01

## 2021-01-01 RX ORDER — VANCOMYCIN HCL 1 G
1000 VIAL (EA) INTRAVENOUS EVERY 12 HOURS
Refills: 0 | Status: DISCONTINUED | OUTPATIENT
Start: 2021-01-01 | End: 2021-01-01

## 2021-01-01 RX ORDER — AZITHROMYCIN 500 MG/1
500 TABLET, FILM COATED ORAL EVERY 24 HOURS
Refills: 0 | Status: COMPLETED | OUTPATIENT
Start: 2021-01-01 | End: 2021-01-01

## 2021-01-01 RX ORDER — POTASSIUM CHLORIDE 20 MEQ
40 PACKET (EA) ORAL EVERY 4 HOURS
Refills: 0 | Status: COMPLETED | OUTPATIENT
Start: 2021-01-01 | End: 2021-01-01

## 2021-01-01 RX ORDER — SODIUM CHLORIDE 9 MG/ML
1000 INJECTION INTRAMUSCULAR; INTRAVENOUS; SUBCUTANEOUS
Refills: 0 | Status: DISCONTINUED | OUTPATIENT
Start: 2021-01-01 | End: 2021-01-01

## 2021-01-01 RX ORDER — DILTIAZEM HCL 120 MG
60 CAPSULE, EXT RELEASE 24 HR ORAL EVERY 6 HOURS
Refills: 0 | Status: DISCONTINUED | OUTPATIENT
Start: 2021-01-01 | End: 2021-01-01

## 2021-01-01 RX ORDER — METOPROLOL TARTRATE 50 MG
12.5 TABLET ORAL DAILY
Refills: 0 | Status: DISCONTINUED | OUTPATIENT
Start: 2021-01-01 | End: 2021-01-01

## 2021-01-01 RX ORDER — GABAPENTIN 400 MG/1
1 CAPSULE ORAL
Qty: 0 | Refills: 0 | DISCHARGE

## 2021-01-01 RX ORDER — DILTIAZEM HCL 120 MG
60 CAPSULE, EXT RELEASE 24 HR ORAL EVERY 6 HOURS
Refills: 0 | Status: COMPLETED | OUTPATIENT
Start: 2021-01-01 | End: 2021-01-01

## 2021-01-01 RX ORDER — WARFARIN SODIUM 2.5 MG/1
1 TABLET ORAL
Qty: 30 | Refills: 0
Start: 2021-01-01 | End: 2021-01-01

## 2021-01-01 RX ORDER — DILTIAZEM HCL 120 MG
10 CAPSULE, EXT RELEASE 24 HR ORAL
Qty: 125 | Refills: 0 | Status: DISCONTINUED | OUTPATIENT
Start: 2021-01-01 | End: 2021-01-01

## 2021-01-01 RX ORDER — WARFARIN SODIUM 2.5 MG/1
2 TABLET ORAL
Qty: 60 | Refills: 0
Start: 2021-01-01 | End: 2021-01-01

## 2021-01-01 RX ORDER — ACETAMINOPHEN 500 MG
2 TABLET ORAL
Qty: 0 | Refills: 0 | DISCHARGE
Start: 2021-01-01

## 2021-01-01 RX ORDER — ASCORBIC ACID 60 MG
1 TABLET,CHEWABLE ORAL
Qty: 0 | Refills: 0 | DISCHARGE

## 2021-01-01 RX ORDER — SODIUM CHLORIDE 9 MG/ML
1000 INJECTION, SOLUTION INTRAVENOUS
Refills: 0 | Status: DISCONTINUED | OUTPATIENT
Start: 2021-01-01 | End: 2021-01-01

## 2021-01-01 RX ORDER — TIOTROPIUM BROMIDE 18 UG/1
1 CAPSULE ORAL; RESPIRATORY (INHALATION) DAILY
Refills: 0 | Status: DISCONTINUED | OUTPATIENT
Start: 2021-01-01 | End: 2021-01-01

## 2021-01-01 RX ORDER — IRON SUCROSE 20 MG/ML
100 INJECTION, SOLUTION INTRAVENOUS EVERY 24 HOURS
Refills: 0 | Status: COMPLETED | OUTPATIENT
Start: 2021-01-01 | End: 2021-01-01

## 2021-01-01 RX ORDER — METRONIDAZOLE 500 MG
TABLET ORAL
Refills: 0 | Status: DISCONTINUED | OUTPATIENT
Start: 2021-01-01 | End: 2021-01-01

## 2021-01-01 RX ORDER — DILTIAZEM HCL 120 MG
240 CAPSULE, EXT RELEASE 24 HR ORAL DAILY
Refills: 0 | Status: DISCONTINUED | OUTPATIENT
Start: 2021-01-01 | End: 2021-01-01

## 2021-01-01 RX ORDER — CEFEPIME 1 G/1
INJECTION, POWDER, FOR SOLUTION INTRAMUSCULAR; INTRAVENOUS
Refills: 0 | Status: DISCONTINUED | OUTPATIENT
Start: 2021-01-01 | End: 2021-01-01

## 2021-01-01 RX ORDER — METOPROLOL TARTRATE 50 MG
12.5 TABLET ORAL EVERY 6 HOURS
Refills: 0 | Status: DISCONTINUED | OUTPATIENT
Start: 2021-01-01 | End: 2021-01-01

## 2021-01-01 RX ORDER — WARFARIN SODIUM 2.5 MG/1
2.5 TABLET ORAL ONCE
Refills: 0 | Status: COMPLETED | OUTPATIENT
Start: 2021-01-01 | End: 2021-01-01

## 2021-01-01 RX ORDER — METOPROLOL TARTRATE 50 MG
100 TABLET ORAL DAILY
Refills: 0 | Status: DISCONTINUED | OUTPATIENT
Start: 2021-01-01 | End: 2021-01-01

## 2021-01-01 RX ORDER — BUDESONIDE AND FORMOTEROL FUMARATE DIHYDRATE 160; 4.5 UG/1; UG/1
2 AEROSOL RESPIRATORY (INHALATION)
Refills: 0 | Status: DISCONTINUED | OUTPATIENT
Start: 2021-01-01 | End: 2021-01-01

## 2021-01-01 RX ORDER — WARFARIN SODIUM 2.5 MG/1
2.5 TABLET ORAL DAILY
Refills: 0 | Status: DISCONTINUED | OUTPATIENT
Start: 2021-01-01 | End: 2021-01-01

## 2021-01-01 RX ORDER — METOPROLOL TARTRATE 50 MG
2.5 TABLET ORAL EVERY 6 HOURS
Refills: 0 | Status: DISCONTINUED | OUTPATIENT
Start: 2021-01-01 | End: 2021-01-01

## 2021-01-01 RX ORDER — LABETALOL HCL 100 MG
20 TABLET ORAL ONCE
Refills: 0 | Status: COMPLETED | OUTPATIENT
Start: 2021-01-01 | End: 2021-01-01

## 2021-01-01 RX ORDER — ALPRAZOLAM 0.25 MG
1 TABLET ORAL
Qty: 0 | Refills: 0 | DISCHARGE

## 2021-01-01 RX ORDER — SODIUM CHLORIDE 9 MG/ML
1000 INJECTION INTRAMUSCULAR; INTRAVENOUS; SUBCUTANEOUS ONCE
Refills: 0 | Status: COMPLETED | OUTPATIENT
Start: 2021-01-01 | End: 2021-01-01

## 2021-01-01 RX ORDER — WARFARIN SODIUM 2.5 MG/1
2.5 TABLET ORAL ONCE
Refills: 0 | Status: DISCONTINUED | OUTPATIENT
Start: 2021-01-01 | End: 2021-01-01

## 2021-01-01 RX ORDER — ASPIRIN/CALCIUM CARB/MAGNESIUM 324 MG
1 TABLET ORAL
Qty: 0 | Refills: 0 | DISCHARGE

## 2021-01-01 RX ORDER — ACETAMINOPHEN 500 MG
1000 TABLET ORAL ONCE
Refills: 0 | Status: COMPLETED | OUTPATIENT
Start: 2021-01-01 | End: 2021-01-01

## 2021-01-01 RX ORDER — HYDROMORPHONE HYDROCHLORIDE 2 MG/ML
0.2 INJECTION INTRAMUSCULAR; INTRAVENOUS; SUBCUTANEOUS EVERY 4 HOURS
Refills: 0 | Status: DISCONTINUED | OUTPATIENT
Start: 2021-01-01 | End: 2021-01-01

## 2021-01-01 RX ORDER — METRONIDAZOLE 500 MG
500 TABLET ORAL EVERY 8 HOURS
Refills: 0 | Status: DISCONTINUED | OUTPATIENT
Start: 2021-01-01 | End: 2021-01-01

## 2021-01-01 RX ORDER — METRONIDAZOLE 500 MG
500 TABLET ORAL ONCE
Refills: 0 | Status: COMPLETED | OUTPATIENT
Start: 2021-01-01 | End: 2021-01-01

## 2021-01-01 RX ORDER — PHYTONADIONE (VIT K1) 5 MG
2.5 TABLET ORAL ONCE
Refills: 0 | Status: COMPLETED | OUTPATIENT
Start: 2021-01-01 | End: 2021-01-01

## 2021-01-01 RX ORDER — PIPERACILLIN AND TAZOBACTAM 4; .5 G/20ML; G/20ML
3.38 INJECTION, POWDER, LYOPHILIZED, FOR SOLUTION INTRAVENOUS ONCE
Refills: 0 | Status: COMPLETED | OUTPATIENT
Start: 2021-01-01 | End: 2021-01-01

## 2021-01-01 RX ORDER — PANTOPRAZOLE SODIUM 20 MG/1
40 TABLET, DELAYED RELEASE ORAL
Refills: 0 | Status: DISCONTINUED | OUTPATIENT
Start: 2021-01-01 | End: 2021-01-01

## 2021-01-01 RX ORDER — PIPERACILLIN AND TAZOBACTAM 4; .5 G/20ML; G/20ML
3.38 INJECTION, POWDER, LYOPHILIZED, FOR SOLUTION INTRAVENOUS EVERY 8 HOURS
Refills: 0 | Status: DISCONTINUED | OUTPATIENT
Start: 2021-01-01 | End: 2021-01-01

## 2021-01-01 RX ORDER — VANCOMYCIN HCL 1 G
1000 VIAL (EA) INTRAVENOUS ONCE
Refills: 0 | Status: COMPLETED | OUTPATIENT
Start: 2021-01-01 | End: 2021-01-01

## 2021-01-01 RX ORDER — AZITHROMYCIN 500 MG/1
500 TABLET, FILM COATED ORAL ONCE
Refills: 0 | Status: COMPLETED | OUTPATIENT
Start: 2021-01-01 | End: 2021-01-01

## 2021-01-01 RX ORDER — IRON SUCROSE 20 MG/ML
100 INJECTION, SOLUTION INTRAVENOUS EVERY 24 HOURS
Refills: 0 | Status: DISCONTINUED | OUTPATIENT
Start: 2021-01-01 | End: 2021-01-01

## 2021-01-01 RX ORDER — IRON SUCROSE 20 MG/ML
200 INJECTION, SOLUTION INTRAVENOUS EVERY 24 HOURS
Refills: 0 | Status: DISCONTINUED | OUTPATIENT
Start: 2021-01-01 | End: 2021-01-01

## 2021-01-01 RX ORDER — AZITHROMYCIN 500 MG/1
TABLET, FILM COATED ORAL
Refills: 0 | Status: COMPLETED | OUTPATIENT
Start: 2021-01-01 | End: 2021-01-01

## 2021-01-01 RX ORDER — ACETAMINOPHEN 500 MG
650 TABLET ORAL EVERY 6 HOURS
Refills: 0 | Status: DISCONTINUED | OUTPATIENT
Start: 2021-01-01 | End: 2021-01-01

## 2021-01-01 RX ORDER — DEXAMETHASONE 0.5 MG/5ML
6 ELIXIR ORAL DAILY
Refills: 0 | Status: COMPLETED | OUTPATIENT
Start: 2021-01-01 | End: 2021-01-01

## 2021-01-01 RX ORDER — PANTOPRAZOLE SODIUM 20 MG/1
40 TABLET, DELAYED RELEASE ORAL EVERY 12 HOURS
Refills: 0 | Status: DISCONTINUED | OUTPATIENT
Start: 2021-01-01 | End: 2021-01-01

## 2021-01-01 RX ORDER — GABAPENTIN 400 MG/1
100 CAPSULE ORAL THREE TIMES A DAY
Refills: 0 | Status: DISCONTINUED | OUTPATIENT
Start: 2021-01-01 | End: 2021-01-01

## 2021-01-01 RX ORDER — ALBUTEROL 90 UG/1
2 AEROSOL, METERED ORAL
Refills: 0 | Status: DISCONTINUED | OUTPATIENT
Start: 2021-01-01 | End: 2021-01-01

## 2021-01-01 RX ORDER — METOPROLOL TARTRATE 50 MG
1 TABLET ORAL
Qty: 0 | Refills: 0 | DISCHARGE

## 2021-01-01 RX ORDER — ASCORBIC ACID 60 MG
500 TABLET,CHEWABLE ORAL DAILY
Refills: 0 | Status: DISCONTINUED | OUTPATIENT
Start: 2021-01-01 | End: 2021-01-01

## 2021-01-01 RX ORDER — LACTOBACILLUS ACIDOPHILUS 100MM CELL
1 CAPSULE ORAL THREE TIMES A DAY
Refills: 0 | Status: DISCONTINUED | OUTPATIENT
Start: 2021-01-01 | End: 2021-01-01

## 2021-01-01 RX ORDER — MORPHINE SULFATE 50 MG/1
2 CAPSULE, EXTENDED RELEASE ORAL ONCE
Refills: 0 | Status: DISCONTINUED | OUTPATIENT
Start: 2021-01-01 | End: 2021-01-01

## 2021-01-01 RX ORDER — HYDROMORPHONE HYDROCHLORIDE 2 MG/ML
0 INJECTION INTRAMUSCULAR; INTRAVENOUS; SUBCUTANEOUS
Qty: 0 | Refills: 0 | DISCHARGE
Start: 2021-01-01

## 2021-01-01 RX ORDER — CEFEPIME 1 G/1
1000 INJECTION, POWDER, FOR SOLUTION INTRAMUSCULAR; INTRAVENOUS EVERY 24 HOURS
Refills: 0 | Status: DISCONTINUED | OUTPATIENT
Start: 2021-01-01 | End: 2021-01-01

## 2021-01-01 RX ORDER — FUROSEMIDE 40 MG
40 TABLET ORAL ONCE
Refills: 0 | Status: COMPLETED | OUTPATIENT
Start: 2021-01-01 | End: 2021-01-01

## 2021-01-01 RX ORDER — CEFEPIME 1 G/1
1000 INJECTION, POWDER, FOR SOLUTION INTRAMUSCULAR; INTRAVENOUS ONCE
Refills: 0 | Status: COMPLETED | OUTPATIENT
Start: 2021-01-01 | End: 2021-01-01

## 2021-01-01 RX ORDER — CEFEPIME 1 G/1
1000 INJECTION, POWDER, FOR SOLUTION INTRAMUSCULAR; INTRAVENOUS EVERY 12 HOURS
Refills: 0 | Status: DISCONTINUED | OUTPATIENT
Start: 2021-01-01 | End: 2021-01-01

## 2021-01-01 RX ORDER — ENOXAPARIN SODIUM 100 MG/ML
40 INJECTION SUBCUTANEOUS DAILY
Refills: 0 | Status: DISCONTINUED | OUTPATIENT
Start: 2021-01-01 | End: 2021-01-01

## 2021-01-01 RX ORDER — ATORVASTATIN CALCIUM 80 MG/1
1 TABLET, FILM COATED ORAL
Qty: 0 | Refills: 0 | DISCHARGE

## 2021-01-01 RX ADMIN — Medication 1 TABLET(S): at 21:24

## 2021-01-01 RX ADMIN — Medication 1000 MILLIGRAM(S): at 18:12

## 2021-01-01 RX ADMIN — Medication 500 MILLIGRAM(S): at 11:13

## 2021-01-01 RX ADMIN — Medication 12.5 MILLIGRAM(S): at 16:51

## 2021-01-01 RX ADMIN — PIPERACILLIN AND TAZOBACTAM 25 GRAM(S): 4; .5 INJECTION, POWDER, LYOPHILIZED, FOR SOLUTION INTRAVENOUS at 21:44

## 2021-01-01 RX ADMIN — TIOTROPIUM BROMIDE 1 CAPSULE(S): 18 CAPSULE ORAL; RESPIRATORY (INHALATION) at 04:36

## 2021-01-01 RX ADMIN — Medication 240 MILLIGRAM(S): at 05:33

## 2021-01-01 RX ADMIN — PANTOPRAZOLE SODIUM 40 MILLIGRAM(S): 20 TABLET, DELAYED RELEASE ORAL at 18:23

## 2021-01-01 RX ADMIN — Medication 100 MILLIGRAM(S): at 21:32

## 2021-01-01 RX ADMIN — GABAPENTIN 100 MILLIGRAM(S): 400 CAPSULE ORAL at 21:12

## 2021-01-01 RX ADMIN — PANTOPRAZOLE SODIUM 40 MILLIGRAM(S): 20 TABLET, DELAYED RELEASE ORAL at 17:11

## 2021-01-01 RX ADMIN — SODIUM CHLORIDE 1000 MILLILITER(S): 9 INJECTION INTRAMUSCULAR; INTRAVENOUS; SUBCUTANEOUS at 01:22

## 2021-01-01 RX ADMIN — CEFEPIME 100 MILLIGRAM(S): 1 INJECTION, POWDER, FOR SOLUTION INTRAMUSCULAR; INTRAVENOUS at 15:02

## 2021-01-01 RX ADMIN — GABAPENTIN 100 MILLIGRAM(S): 400 CAPSULE ORAL at 21:40

## 2021-01-01 RX ADMIN — HYDROMORPHONE HYDROCHLORIDE 0.2 MILLIGRAM(S): 2 INJECTION INTRAMUSCULAR; INTRAVENOUS; SUBCUTANEOUS at 12:33

## 2021-01-01 RX ADMIN — Medication 1 TABLET(S): at 13:21

## 2021-01-01 RX ADMIN — Medication 500 MILLIGRAM(S): at 05:27

## 2021-01-01 RX ADMIN — Medication 500 MILLIGRAM(S): at 10:30

## 2021-01-01 RX ADMIN — Medication 1 TABLET(S): at 05:31

## 2021-01-01 RX ADMIN — Medication 240 MILLIGRAM(S): at 04:59

## 2021-01-01 RX ADMIN — GABAPENTIN 100 MILLIGRAM(S): 400 CAPSULE ORAL at 13:58

## 2021-01-01 RX ADMIN — Medication 1 TABLET(S): at 12:33

## 2021-01-01 RX ADMIN — Medication 6 MILLIGRAM(S): at 04:59

## 2021-01-01 RX ADMIN — Medication 500 MILLIGRAM(S): at 12:28

## 2021-01-01 RX ADMIN — BUDESONIDE AND FORMOTEROL FUMARATE DIHYDRATE 2 PUFF(S): 160; 4.5 AEROSOL RESPIRATORY (INHALATION) at 18:45

## 2021-01-01 RX ADMIN — AZITHROMYCIN 255 MILLIGRAM(S): 500 TABLET, FILM COATED ORAL at 11:26

## 2021-01-01 RX ADMIN — CEFEPIME 100 MILLIGRAM(S): 1 INJECTION, POWDER, FOR SOLUTION INTRAMUSCULAR; INTRAVENOUS at 13:51

## 2021-01-01 RX ADMIN — PANTOPRAZOLE SODIUM 40 MILLIGRAM(S): 20 TABLET, DELAYED RELEASE ORAL at 04:59

## 2021-01-01 RX ADMIN — BUDESONIDE AND FORMOTEROL FUMARATE DIHYDRATE 2 PUFF(S): 160; 4.5 AEROSOL RESPIRATORY (INHALATION) at 05:48

## 2021-01-01 RX ADMIN — Medication 1 TABLET(S): at 11:55

## 2021-01-01 RX ADMIN — Medication 240 MILLIGRAM(S): at 05:21

## 2021-01-01 RX ADMIN — BUDESONIDE AND FORMOTEROL FUMARATE DIHYDRATE 2 PUFF(S): 160; 4.5 AEROSOL RESPIRATORY (INHALATION) at 05:29

## 2021-01-01 RX ADMIN — Medication 1 TABLET(S): at 13:33

## 2021-01-01 RX ADMIN — GABAPENTIN 100 MILLIGRAM(S): 400 CAPSULE ORAL at 13:33

## 2021-01-01 RX ADMIN — GABAPENTIN 100 MILLIGRAM(S): 400 CAPSULE ORAL at 13:46

## 2021-01-01 RX ADMIN — PIPERACILLIN AND TAZOBACTAM 25 GRAM(S): 4; .5 INJECTION, POWDER, LYOPHILIZED, FOR SOLUTION INTRAVENOUS at 21:41

## 2021-01-01 RX ADMIN — Medication 10 MG/HR: at 18:47

## 2021-01-01 RX ADMIN — PIPERACILLIN AND TAZOBACTAM 25 GRAM(S): 4; .5 INJECTION, POWDER, LYOPHILIZED, FOR SOLUTION INTRAVENOUS at 13:44

## 2021-01-01 RX ADMIN — GABAPENTIN 100 MILLIGRAM(S): 400 CAPSULE ORAL at 21:56

## 2021-01-01 RX ADMIN — HYDROMORPHONE HYDROCHLORIDE 0.2 MILLIGRAM(S): 2 INJECTION INTRAMUSCULAR; INTRAVENOUS; SUBCUTANEOUS at 16:00

## 2021-01-01 RX ADMIN — Medication 2.5 MILLIGRAM(S): at 05:42

## 2021-01-01 RX ADMIN — Medication 100 MILLIGRAM(S): at 20:53

## 2021-01-01 RX ADMIN — PIPERACILLIN AND TAZOBACTAM 25 GRAM(S): 4; .5 INJECTION, POWDER, LYOPHILIZED, FOR SOLUTION INTRAVENOUS at 05:42

## 2021-01-01 RX ADMIN — Medication 12.5 MILLIGRAM(S): at 14:33

## 2021-01-01 RX ADMIN — GABAPENTIN 100 MILLIGRAM(S): 400 CAPSULE ORAL at 07:07

## 2021-01-01 RX ADMIN — GABAPENTIN 100 MILLIGRAM(S): 400 CAPSULE ORAL at 21:39

## 2021-01-01 RX ADMIN — PANTOPRAZOLE SODIUM 40 MILLIGRAM(S): 20 TABLET, DELAYED RELEASE ORAL at 17:13

## 2021-01-01 RX ADMIN — BUDESONIDE AND FORMOTEROL FUMARATE DIHYDRATE 2 PUFF(S): 160; 4.5 AEROSOL RESPIRATORY (INHALATION) at 05:22

## 2021-01-01 RX ADMIN — BUDESONIDE AND FORMOTEROL FUMARATE DIHYDRATE 2 PUFF(S): 160; 4.5 AEROSOL RESPIRATORY (INHALATION) at 21:57

## 2021-01-01 RX ADMIN — SODIUM CHLORIDE 60 MILLILITER(S): 9 INJECTION INTRAMUSCULAR; INTRAVENOUS; SUBCUTANEOUS at 09:03

## 2021-01-01 RX ADMIN — BUDESONIDE AND FORMOTEROL FUMARATE DIHYDRATE 2 PUFF(S): 160; 4.5 AEROSOL RESPIRATORY (INHALATION) at 05:59

## 2021-01-01 RX ADMIN — AZITHROMYCIN 500 MILLIGRAM(S): 500 TABLET, FILM COATED ORAL at 13:56

## 2021-01-01 RX ADMIN — GABAPENTIN 100 MILLIGRAM(S): 400 CAPSULE ORAL at 21:21

## 2021-01-01 RX ADMIN — ATORVASTATIN CALCIUM 40 MILLIGRAM(S): 80 TABLET, FILM COATED ORAL at 21:33

## 2021-01-01 RX ADMIN — Medication 81 MILLIGRAM(S): at 11:12

## 2021-01-01 RX ADMIN — Medication 12.5 MILLIGRAM(S): at 23:45

## 2021-01-01 RX ADMIN — BUDESONIDE AND FORMOTEROL FUMARATE DIHYDRATE 2 PUFF(S): 160; 4.5 AEROSOL RESPIRATORY (INHALATION) at 05:30

## 2021-01-01 RX ADMIN — Medication 1 SPRAY(S): at 13:22

## 2021-01-01 RX ADMIN — PIPERACILLIN AND TAZOBACTAM 25 GRAM(S): 4; .5 INJECTION, POWDER, LYOPHILIZED, FOR SOLUTION INTRAVENOUS at 13:50

## 2021-01-01 RX ADMIN — PANTOPRAZOLE SODIUM 40 MILLIGRAM(S): 20 TABLET, DELAYED RELEASE ORAL at 19:50

## 2021-01-01 RX ADMIN — Medication 12.5 MILLIGRAM(S): at 10:30

## 2021-01-01 RX ADMIN — GABAPENTIN 100 MILLIGRAM(S): 400 CAPSULE ORAL at 07:46

## 2021-01-01 RX ADMIN — Medication 240 MILLIGRAM(S): at 05:09

## 2021-01-01 RX ADMIN — Medication 6 MILLIGRAM(S): at 05:41

## 2021-01-01 RX ADMIN — PANTOPRAZOLE SODIUM 40 MILLIGRAM(S): 20 TABLET, DELAYED RELEASE ORAL at 05:31

## 2021-01-01 RX ADMIN — BUDESONIDE AND FORMOTEROL FUMARATE DIHYDRATE 2 PUFF(S): 160; 4.5 AEROSOL RESPIRATORY (INHALATION) at 16:09

## 2021-01-01 RX ADMIN — PANTOPRAZOLE SODIUM 40 MILLIGRAM(S): 20 TABLET, DELAYED RELEASE ORAL at 05:11

## 2021-01-01 RX ADMIN — WARFARIN SODIUM 2.5 MILLIGRAM(S): 2.5 TABLET ORAL at 21:25

## 2021-01-01 RX ADMIN — MORPHINE SULFATE 2 MILLIGRAM(S): 50 CAPSULE, EXTENDED RELEASE ORAL at 12:18

## 2021-01-01 RX ADMIN — Medication 100 MILLIGRAM(S): at 17:13

## 2021-01-01 RX ADMIN — Medication 200 MILLIGRAM(S): at 17:24

## 2021-01-01 RX ADMIN — PANTOPRAZOLE SODIUM 40 MILLIGRAM(S): 20 TABLET, DELAYED RELEASE ORAL at 05:09

## 2021-01-01 RX ADMIN — Medication 500 MILLIGRAM(S): at 20:39

## 2021-01-01 RX ADMIN — Medication 240 MILLIGRAM(S): at 05:42

## 2021-01-01 RX ADMIN — Medication 81 MILLIGRAM(S): at 12:28

## 2021-01-01 RX ADMIN — TIOTROPIUM BROMIDE 1 CAPSULE(S): 18 CAPSULE ORAL; RESPIRATORY (INHALATION) at 05:04

## 2021-01-01 RX ADMIN — Medication 10 MG/HR: at 13:51

## 2021-01-01 RX ADMIN — Medication 12.5 MILLIGRAM(S): at 05:31

## 2021-01-01 RX ADMIN — BUDESONIDE AND FORMOTEROL FUMARATE DIHYDRATE 2 PUFF(S): 160; 4.5 AEROSOL RESPIRATORY (INHALATION) at 05:03

## 2021-01-01 RX ADMIN — Medication 500 MILLIGRAM(S): at 21:59

## 2021-01-01 RX ADMIN — PANTOPRAZOLE SODIUM 40 MILLIGRAM(S): 20 TABLET, DELAYED RELEASE ORAL at 04:33

## 2021-01-01 RX ADMIN — Medication 6 MILLIGRAM(S): at 04:35

## 2021-01-01 RX ADMIN — GABAPENTIN 100 MILLIGRAM(S): 400 CAPSULE ORAL at 15:31

## 2021-01-01 RX ADMIN — Medication 240 MILLIGRAM(S): at 05:31

## 2021-01-01 RX ADMIN — Medication 81 MILLIGRAM(S): at 10:30

## 2021-01-01 RX ADMIN — IRON SUCROSE 100 MILLIGRAM(S): 20 INJECTION, SOLUTION INTRAVENOUS at 17:20

## 2021-01-01 RX ADMIN — Medication 500 MILLIGRAM(S): at 23:45

## 2021-01-01 RX ADMIN — Medication 12.5 MILLIGRAM(S): at 13:21

## 2021-01-01 RX ADMIN — Medication 12.5 MILLIGRAM(S): at 04:59

## 2021-01-01 RX ADMIN — Medication 81 MILLIGRAM(S): at 13:24

## 2021-01-01 RX ADMIN — Medication 500 MILLIGRAM(S): at 21:21

## 2021-01-01 RX ADMIN — Medication 1 SPRAY(S): at 21:22

## 2021-01-01 RX ADMIN — BUDESONIDE AND FORMOTEROL FUMARATE DIHYDRATE 2 PUFF(S): 160; 4.5 AEROSOL RESPIRATORY (INHALATION) at 19:30

## 2021-01-01 RX ADMIN — GABAPENTIN 100 MILLIGRAM(S): 400 CAPSULE ORAL at 05:42

## 2021-01-01 RX ADMIN — Medication 6 MILLIGRAM(S): at 05:11

## 2021-01-01 RX ADMIN — Medication 12.5 MILLIGRAM(S): at 13:45

## 2021-01-01 RX ADMIN — BUDESONIDE AND FORMOTEROL FUMARATE DIHYDRATE 2 PUFF(S): 160; 4.5 AEROSOL RESPIRATORY (INHALATION) at 21:59

## 2021-01-01 RX ADMIN — Medication 1 TABLET(S): at 05:21

## 2021-01-01 RX ADMIN — PANTOPRAZOLE SODIUM 40 MILLIGRAM(S): 20 TABLET, DELAYED RELEASE ORAL at 16:08

## 2021-01-01 RX ADMIN — Medication 10 MG/HR: at 22:50

## 2021-01-01 RX ADMIN — Medication 100 MILLIGRAM(S): at 13:25

## 2021-01-01 RX ADMIN — PIPERACILLIN AND TAZOBACTAM 3.38 GRAM(S): 4; .5 INJECTION, POWDER, LYOPHILIZED, FOR SOLUTION INTRAVENOUS at 15:58

## 2021-01-01 RX ADMIN — Medication 1 TABLET(S): at 12:28

## 2021-01-01 RX ADMIN — Medication 500 MILLIGRAM(S): at 11:30

## 2021-01-01 RX ADMIN — ATORVASTATIN CALCIUM 40 MILLIGRAM(S): 80 TABLET, FILM COATED ORAL at 20:40

## 2021-01-01 RX ADMIN — Medication 1 TABLET(S): at 04:59

## 2021-01-01 RX ADMIN — ATORVASTATIN CALCIUM 40 MILLIGRAM(S): 80 TABLET, FILM COATED ORAL at 21:44

## 2021-01-01 RX ADMIN — Medication 1 SPRAY(S): at 04:59

## 2021-01-01 RX ADMIN — Medication 1 SPRAY(S): at 20:40

## 2021-01-01 RX ADMIN — Medication 12.5 MILLIGRAM(S): at 05:08

## 2021-01-01 RX ADMIN — Medication 6 MILLIGRAM(S): at 05:30

## 2021-01-01 RX ADMIN — TIOTROPIUM BROMIDE 1 CAPSULE(S): 18 CAPSULE ORAL; RESPIRATORY (INHALATION) at 05:29

## 2021-01-01 RX ADMIN — GABAPENTIN 100 MILLIGRAM(S): 400 CAPSULE ORAL at 06:01

## 2021-01-01 RX ADMIN — ATORVASTATIN CALCIUM 40 MILLIGRAM(S): 80 TABLET, FILM COATED ORAL at 21:40

## 2021-01-01 RX ADMIN — Medication 40 MILLIGRAM(S): at 18:52

## 2021-01-01 RX ADMIN — Medication 1 TABLET(S): at 10:30

## 2021-01-01 RX ADMIN — Medication 81 MILLIGRAM(S): at 11:54

## 2021-01-01 RX ADMIN — Medication 1 SPRAY(S): at 05:59

## 2021-01-01 RX ADMIN — Medication 2.5 MILLIGRAM(S): at 15:35

## 2021-01-01 RX ADMIN — Medication 20 MILLIGRAM(S): at 13:19

## 2021-01-01 RX ADMIN — PANTOPRAZOLE SODIUM 40 MILLIGRAM(S): 20 TABLET, DELAYED RELEASE ORAL at 07:43

## 2021-01-01 RX ADMIN — PANTOPRAZOLE SODIUM 40 MILLIGRAM(S): 20 TABLET, DELAYED RELEASE ORAL at 05:59

## 2021-01-01 RX ADMIN — Medication 12.5 MILLIGRAM(S): at 17:56

## 2021-01-01 RX ADMIN — CEFEPIME 100 MILLIGRAM(S): 1 INJECTION, POWDER, FOR SOLUTION INTRAMUSCULAR; INTRAVENOUS at 00:00

## 2021-01-01 RX ADMIN — Medication 240 MILLIGRAM(S): at 05:59

## 2021-01-01 RX ADMIN — Medication 81 MILLIGRAM(S): at 11:30

## 2021-01-01 RX ADMIN — PANTOPRAZOLE SODIUM 40 MILLIGRAM(S): 20 TABLET, DELAYED RELEASE ORAL at 05:41

## 2021-01-01 RX ADMIN — AZITHROMYCIN 255 MILLIGRAM(S): 500 TABLET, FILM COATED ORAL at 11:55

## 2021-01-01 RX ADMIN — SODIUM CHLORIDE 50 MILLILITER(S): 9 INJECTION, SOLUTION INTRAVENOUS at 12:35

## 2021-01-01 RX ADMIN — Medication 1 TABLET(S): at 04:33

## 2021-01-01 RX ADMIN — Medication 1 TABLET(S): at 21:21

## 2021-01-01 RX ADMIN — GABAPENTIN 100 MILLIGRAM(S): 400 CAPSULE ORAL at 21:44

## 2021-01-01 RX ADMIN — Medication 12.5 MILLIGRAM(S): at 04:33

## 2021-01-01 RX ADMIN — Medication 500 MILLIGRAM(S): at 13:41

## 2021-01-01 RX ADMIN — BUDESONIDE AND FORMOTEROL FUMARATE DIHYDRATE 2 PUFF(S): 160; 4.5 AEROSOL RESPIRATORY (INHALATION) at 21:22

## 2021-01-01 RX ADMIN — GABAPENTIN 100 MILLIGRAM(S): 400 CAPSULE ORAL at 20:40

## 2021-01-01 RX ADMIN — Medication 6 MILLIGRAM(S): at 08:04

## 2021-01-01 RX ADMIN — CEFEPIME 100 MILLIGRAM(S): 1 INJECTION, POWDER, FOR SOLUTION INTRAMUSCULAR; INTRAVENOUS at 16:01

## 2021-01-01 RX ADMIN — GABAPENTIN 100 MILLIGRAM(S): 400 CAPSULE ORAL at 05:09

## 2021-01-01 RX ADMIN — Medication 1 SPRAY(S): at 12:28

## 2021-01-01 RX ADMIN — TIOTROPIUM BROMIDE 1 CAPSULE(S): 18 CAPSULE ORAL; RESPIRATORY (INHALATION) at 05:59

## 2021-01-01 RX ADMIN — Medication 12.5 MILLIGRAM(S): at 00:13

## 2021-01-01 RX ADMIN — Medication 12.5 MILLIGRAM(S): at 22:57

## 2021-01-01 RX ADMIN — Medication 1 TABLET(S): at 13:24

## 2021-01-01 RX ADMIN — Medication 12.5 MILLIGRAM(S): at 18:23

## 2021-01-01 RX ADMIN — Medication 1 TABLET(S): at 11:12

## 2021-01-01 RX ADMIN — GABAPENTIN 100 MILLIGRAM(S): 400 CAPSULE ORAL at 06:11

## 2021-01-01 RX ADMIN — Medication 500 MILLIGRAM(S): at 13:24

## 2021-01-01 RX ADMIN — GABAPENTIN 100 MILLIGRAM(S): 400 CAPSULE ORAL at 05:31

## 2021-01-01 RX ADMIN — SODIUM CHLORIDE 75 MILLILITER(S): 9 INJECTION, SOLUTION INTRAVENOUS at 10:19

## 2021-01-01 RX ADMIN — IRON SUCROSE 100 MILLIGRAM(S): 20 INJECTION, SOLUTION INTRAVENOUS at 16:02

## 2021-01-01 RX ADMIN — ATORVASTATIN CALCIUM 40 MILLIGRAM(S): 80 TABLET, FILM COATED ORAL at 21:12

## 2021-01-01 RX ADMIN — Medication 100 MILLIGRAM(S): at 05:33

## 2021-01-01 RX ADMIN — PANTOPRAZOLE SODIUM 40 MILLIGRAM(S): 20 TABLET, DELAYED RELEASE ORAL at 17:24

## 2021-01-01 RX ADMIN — BUDESONIDE AND FORMOTEROL FUMARATE DIHYDRATE 2 PUFF(S): 160; 4.5 AEROSOL RESPIRATORY (INHALATION) at 18:54

## 2021-01-01 RX ADMIN — Medication 12.5 MILLIGRAM(S): at 05:27

## 2021-01-01 RX ADMIN — Medication 240 MILLIGRAM(S): at 04:33

## 2021-01-01 RX ADMIN — GABAPENTIN 100 MILLIGRAM(S): 400 CAPSULE ORAL at 12:28

## 2021-01-01 RX ADMIN — WARFARIN SODIUM 2.5 MILLIGRAM(S): 2.5 TABLET ORAL at 21:40

## 2021-01-01 RX ADMIN — Medication 650 MILLIGRAM(S): at 05:27

## 2021-01-01 RX ADMIN — PANTOPRAZOLE SODIUM 40 MILLIGRAM(S): 20 TABLET, DELAYED RELEASE ORAL at 16:51

## 2021-01-01 RX ADMIN — Medication 81 MILLIGRAM(S): at 11:22

## 2021-01-01 RX ADMIN — Medication 1 TABLET(S): at 13:43

## 2021-01-01 RX ADMIN — Medication 12.5 MILLIGRAM(S): at 10:31

## 2021-01-01 RX ADMIN — Medication 60 MILLIGRAM(S): at 13:55

## 2021-01-01 RX ADMIN — Medication 12.5 MILLIGRAM(S): at 17:09

## 2021-01-01 RX ADMIN — Medication 6 MILLIGRAM(S): at 05:31

## 2021-01-01 RX ADMIN — SODIUM CHLORIDE 50 MILLILITER(S): 9 INJECTION, SOLUTION INTRAVENOUS at 20:40

## 2021-01-01 RX ADMIN — Medication 500 MILLIGRAM(S): at 13:21

## 2021-01-01 RX ADMIN — Medication 12.5 MILLIGRAM(S): at 05:59

## 2021-01-01 RX ADMIN — CEFEPIME 100 MILLIGRAM(S): 1 INJECTION, POWDER, FOR SOLUTION INTRAMUSCULAR; INTRAVENOUS at 13:25

## 2021-01-01 RX ADMIN — PIPERACILLIN AND TAZOBACTAM 25 GRAM(S): 4; .5 INJECTION, POWDER, LYOPHILIZED, FOR SOLUTION INTRAVENOUS at 05:09

## 2021-01-01 RX ADMIN — Medication 40 MILLIEQUIVALENT(S): at 17:11

## 2021-01-01 RX ADMIN — PANTOPRAZOLE SODIUM 40 MILLIGRAM(S): 20 TABLET, DELAYED RELEASE ORAL at 17:20

## 2021-01-01 RX ADMIN — PANTOPRAZOLE SODIUM 40 MILLIGRAM(S): 20 TABLET, DELAYED RELEASE ORAL at 05:21

## 2021-01-01 RX ADMIN — GABAPENTIN 100 MILLIGRAM(S): 400 CAPSULE ORAL at 13:18

## 2021-01-01 RX ADMIN — BUDESONIDE AND FORMOTEROL FUMARATE DIHYDRATE 2 PUFF(S): 160; 4.5 AEROSOL RESPIRATORY (INHALATION) at 05:13

## 2021-01-01 RX ADMIN — GABAPENTIN 100 MILLIGRAM(S): 400 CAPSULE ORAL at 13:25

## 2021-01-01 RX ADMIN — Medication 81 MILLIGRAM(S): at 13:43

## 2021-01-01 RX ADMIN — Medication 100 MILLIGRAM(S): at 13:57

## 2021-01-01 RX ADMIN — PIPERACILLIN AND TAZOBACTAM 200 GRAM(S): 4; .5 INJECTION, POWDER, LYOPHILIZED, FOR SOLUTION INTRAVENOUS at 15:28

## 2021-01-01 RX ADMIN — Medication 100 MILLIGRAM(S): at 13:34

## 2021-01-01 RX ADMIN — Medication 12.5 MILLIGRAM(S): at 05:21

## 2021-01-01 RX ADMIN — Medication 60 MILLIGRAM(S): at 18:23

## 2021-01-01 RX ADMIN — IRON SUCROSE 100 MILLIGRAM(S): 20 INJECTION, SOLUTION INTRAVENOUS at 17:56

## 2021-01-01 RX ADMIN — GABAPENTIN 100 MILLIGRAM(S): 400 CAPSULE ORAL at 04:34

## 2021-01-01 RX ADMIN — PIPERACILLIN AND TAZOBACTAM 25 GRAM(S): 4; .5 INJECTION, POWDER, LYOPHILIZED, FOR SOLUTION INTRAVENOUS at 21:12

## 2021-01-01 RX ADMIN — Medication 12.5 MILLIGRAM(S): at 16:02

## 2021-01-01 RX ADMIN — PANTOPRAZOLE SODIUM 40 MILLIGRAM(S): 20 TABLET, DELAYED RELEASE ORAL at 18:27

## 2021-01-01 RX ADMIN — Medication 1 SPRAY(S): at 05:28

## 2021-01-01 RX ADMIN — Medication 1 TABLET(S): at 05:41

## 2021-01-01 RX ADMIN — Medication 1 TABLET(S): at 12:34

## 2021-01-01 RX ADMIN — GABAPENTIN 100 MILLIGRAM(S): 400 CAPSULE ORAL at 22:01

## 2021-01-01 RX ADMIN — Medication 500 MILLIGRAM(S): at 04:59

## 2021-01-01 RX ADMIN — PIPERACILLIN AND TAZOBACTAM 25 GRAM(S): 4; .5 INJECTION, POWDER, LYOPHILIZED, FOR SOLUTION INTRAVENOUS at 07:44

## 2021-01-01 RX ADMIN — GABAPENTIN 100 MILLIGRAM(S): 400 CAPSULE ORAL at 20:39

## 2021-01-01 RX ADMIN — Medication 1 TABLET(S): at 21:33

## 2021-01-01 RX ADMIN — PANTOPRAZOLE SODIUM 40 MILLIGRAM(S): 20 TABLET, DELAYED RELEASE ORAL at 05:32

## 2021-01-01 RX ADMIN — Medication 60 MILLIGRAM(S): at 17:09

## 2021-01-01 RX ADMIN — GABAPENTIN 100 MILLIGRAM(S): 400 CAPSULE ORAL at 05:22

## 2021-01-01 RX ADMIN — Medication 12.5 MILLIGRAM(S): at 17:12

## 2021-01-01 RX ADMIN — Medication 500 MILLIGRAM(S): at 13:20

## 2021-01-01 RX ADMIN — TIOTROPIUM BROMIDE 1 CAPSULE(S): 18 CAPSULE ORAL; RESPIRATORY (INHALATION) at 05:30

## 2021-01-01 RX ADMIN — Medication 12.5 MILLIGRAM(S): at 07:43

## 2021-01-01 RX ADMIN — Medication 60 MILLIGRAM(S): at 23:33

## 2021-01-01 RX ADMIN — SODIUM CHLORIDE 75 MILLILITER(S): 9 INJECTION, SOLUTION INTRAVENOUS at 09:03

## 2021-01-01 RX ADMIN — Medication 400 MILLIGRAM(S): at 18:02

## 2021-01-01 RX ADMIN — Medication 1 TABLET(S): at 13:07

## 2021-01-01 RX ADMIN — GABAPENTIN 100 MILLIGRAM(S): 400 CAPSULE ORAL at 05:45

## 2021-01-01 RX ADMIN — Medication 500 MILLIGRAM(S): at 05:59

## 2021-01-01 RX ADMIN — Medication 12.5 MILLIGRAM(S): at 17:20

## 2021-01-01 RX ADMIN — PIPERACILLIN AND TAZOBACTAM 25 GRAM(S): 4; .5 INJECTION, POWDER, LYOPHILIZED, FOR SOLUTION INTRAVENOUS at 12:32

## 2021-01-01 RX ADMIN — CEFEPIME 100 MILLIGRAM(S): 1 INJECTION, POWDER, FOR SOLUTION INTRAMUSCULAR; INTRAVENOUS at 00:38

## 2021-01-01 RX ADMIN — ATORVASTATIN CALCIUM 40 MILLIGRAM(S): 80 TABLET, FILM COATED ORAL at 21:21

## 2021-01-01 RX ADMIN — Medication 12.5 MILLIGRAM(S): at 13:43

## 2021-01-01 RX ADMIN — Medication 100 MILLIGRAM(S): at 05:20

## 2021-01-01 RX ADMIN — Medication 81 MILLIGRAM(S): at 12:33

## 2021-01-01 RX ADMIN — Medication 500 MILLIGRAM(S): at 12:33

## 2021-01-01 RX ADMIN — Medication 1 SPRAY(S): at 13:51

## 2021-01-01 RX ADMIN — PANTOPRAZOLE SODIUM 40 MILLIGRAM(S): 20 TABLET, DELAYED RELEASE ORAL at 05:42

## 2021-01-01 RX ADMIN — Medication 500 MILLIGRAM(S): at 05:41

## 2021-01-01 RX ADMIN — GABAPENTIN 100 MILLIGRAM(S): 400 CAPSULE ORAL at 20:43

## 2021-01-01 RX ADMIN — Medication 1 TABLET(S): at 20:53

## 2021-01-01 RX ADMIN — Medication 1 SPRAY(S): at 05:40

## 2021-01-01 RX ADMIN — GABAPENTIN 100 MILLIGRAM(S): 400 CAPSULE ORAL at 13:23

## 2021-01-01 RX ADMIN — ATORVASTATIN CALCIUM 40 MILLIGRAM(S): 80 TABLET, FILM COATED ORAL at 21:24

## 2021-01-01 RX ADMIN — GABAPENTIN 100 MILLIGRAM(S): 400 CAPSULE ORAL at 13:07

## 2021-01-01 RX ADMIN — PANTOPRAZOLE SODIUM 40 MILLIGRAM(S): 20 TABLET, DELAYED RELEASE ORAL at 17:09

## 2021-01-01 RX ADMIN — Medication 12.5 MILLIGRAM(S): at 00:22

## 2021-01-01 RX ADMIN — Medication 12.5 MILLIGRAM(S): at 00:38

## 2021-01-01 RX ADMIN — GABAPENTIN 100 MILLIGRAM(S): 400 CAPSULE ORAL at 04:59

## 2021-01-01 RX ADMIN — Medication 240 MILLIGRAM(S): at 05:27

## 2021-01-01 RX ADMIN — CEFEPIME 100 MILLIGRAM(S): 1 INJECTION, POWDER, FOR SOLUTION INTRAMUSCULAR; INTRAVENOUS at 13:27

## 2021-01-01 RX ADMIN — Medication 500 MILLIGRAM(S): at 04:35

## 2021-01-01 RX ADMIN — PIPERACILLIN AND TAZOBACTAM 25 GRAM(S): 4; .5 INJECTION, POWDER, LYOPHILIZED, FOR SOLUTION INTRAVENOUS at 15:09

## 2021-01-01 RX ADMIN — PANTOPRAZOLE SODIUM 40 MILLIGRAM(S): 20 TABLET, DELAYED RELEASE ORAL at 16:02

## 2021-01-01 RX ADMIN — Medication 1 TABLET(S): at 05:59

## 2021-01-01 RX ADMIN — Medication 60 MILLIGRAM(S): at 07:43

## 2021-01-01 RX ADMIN — TIOTROPIUM BROMIDE 1 CAPSULE(S): 18 CAPSULE ORAL; RESPIRATORY (INHALATION) at 05:41

## 2021-01-01 RX ADMIN — GABAPENTIN 100 MILLIGRAM(S): 400 CAPSULE ORAL at 13:50

## 2021-01-01 RX ADMIN — PIPERACILLIN AND TAZOBACTAM 200 GRAM(S): 4; .5 INJECTION, POWDER, LYOPHILIZED, FOR SOLUTION INTRAVENOUS at 05:27

## 2021-01-01 RX ADMIN — GABAPENTIN 100 MILLIGRAM(S): 400 CAPSULE ORAL at 05:19

## 2021-01-01 RX ADMIN — PANTOPRAZOLE SODIUM 40 MILLIGRAM(S): 20 TABLET, DELAYED RELEASE ORAL at 05:27

## 2021-01-01 RX ADMIN — Medication 12.5 MILLIGRAM(S): at 01:29

## 2021-01-01 RX ADMIN — Medication 12.5 MILLIGRAM(S): at 00:17

## 2021-01-01 RX ADMIN — Medication 12.5 MILLIGRAM(S): at 12:28

## 2021-01-01 RX ADMIN — BUDESONIDE AND FORMOTEROL FUMARATE DIHYDRATE 2 PUFF(S): 160; 4.5 AEROSOL RESPIRATORY (INHALATION) at 17:13

## 2021-01-01 RX ADMIN — PANTOPRAZOLE SODIUM 40 MILLIGRAM(S): 20 TABLET, DELAYED RELEASE ORAL at 17:56

## 2021-01-01 RX ADMIN — Medication 12.5 MILLIGRAM(S): at 23:33

## 2021-01-01 RX ADMIN — Medication 1 TABLET(S): at 13:25

## 2021-01-01 RX ADMIN — Medication 1 TABLET(S): at 21:56

## 2021-01-01 RX ADMIN — GABAPENTIN 100 MILLIGRAM(S): 400 CAPSULE ORAL at 21:08

## 2021-01-01 RX ADMIN — Medication 12.5 MILLIGRAM(S): at 18:27

## 2021-01-01 RX ADMIN — TIOTROPIUM BROMIDE 1 CAPSULE(S): 18 CAPSULE ORAL; RESPIRATORY (INHALATION) at 05:23

## 2021-01-01 RX ADMIN — WARFARIN SODIUM 2.5 MILLIGRAM(S): 2.5 TABLET ORAL at 21:56

## 2021-01-01 RX ADMIN — Medication 500 MILLIGRAM(S): at 21:56

## 2021-01-01 RX ADMIN — ATORVASTATIN CALCIUM 40 MILLIGRAM(S): 80 TABLET, FILM COATED ORAL at 21:59

## 2021-01-01 RX ADMIN — Medication 60 MILLIGRAM(S): at 00:13

## 2021-01-01 RX ADMIN — ATORVASTATIN CALCIUM 40 MILLIGRAM(S): 80 TABLET, FILM COATED ORAL at 20:43

## 2021-01-01 RX ADMIN — Medication 6 MILLIGRAM(S): at 05:28

## 2021-01-01 RX ADMIN — Medication 1 TABLET(S): at 13:55

## 2021-01-01 RX ADMIN — BUDESONIDE AND FORMOTEROL FUMARATE DIHYDRATE 2 PUFF(S): 160; 4.5 AEROSOL RESPIRATORY (INHALATION) at 17:17

## 2021-01-01 RX ADMIN — Medication 2.5 MILLIGRAM(S): at 17:24

## 2021-01-01 RX ADMIN — Medication 500 MILLIGRAM(S): at 13:44

## 2021-01-01 RX ADMIN — Medication 1 TABLET(S): at 21:59

## 2021-01-01 RX ADMIN — CEFEPIME 100 MILLIGRAM(S): 1 INJECTION, POWDER, FOR SOLUTION INTRAMUSCULAR; INTRAVENOUS at 15:01

## 2021-01-01 RX ADMIN — SODIUM CHLORIDE 50 MILLILITER(S): 9 INJECTION, SOLUTION INTRAVENOUS at 06:43

## 2021-01-01 RX ADMIN — TIOTROPIUM BROMIDE 1 CAPSULE(S): 18 CAPSULE ORAL; RESPIRATORY (INHALATION) at 05:13

## 2021-01-01 RX ADMIN — Medication 100 MILLIGRAM(S): at 13:12

## 2021-01-01 RX ADMIN — GABAPENTIN 100 MILLIGRAM(S): 400 CAPSULE ORAL at 12:33

## 2021-01-01 RX ADMIN — Medication 100 MILLIGRAM(S): at 05:42

## 2021-01-01 RX ADMIN — Medication 12.5 MILLIGRAM(S): at 13:55

## 2021-01-01 RX ADMIN — SODIUM CHLORIDE 60 MILLILITER(S): 9 INJECTION INTRAMUSCULAR; INTRAVENOUS; SUBCUTANEOUS at 19:18

## 2021-01-01 RX ADMIN — Medication 1 SPRAY(S): at 21:57

## 2021-01-01 RX ADMIN — Medication 6 MILLIGRAM(S): at 05:22

## 2021-01-01 RX ADMIN — Medication 60 MILLIGRAM(S): at 11:55

## 2021-01-01 RX ADMIN — Medication 6 MILLIGRAM(S): at 05:59

## 2021-01-01 RX ADMIN — Medication 100 MILLIGRAM(S): at 20:49

## 2021-01-01 RX ADMIN — CEFEPIME 100 MILLIGRAM(S): 1 INJECTION, POWDER, FOR SOLUTION INTRAMUSCULAR; INTRAVENOUS at 13:23

## 2021-01-01 RX ADMIN — Medication 1 TABLET(S): at 05:27

## 2021-01-01 RX ADMIN — Medication 81 MILLIGRAM(S): at 13:19

## 2021-01-01 RX ADMIN — Medication 250 MILLIGRAM(S): at 16:22

## 2021-01-01 RX ADMIN — Medication 1 TABLET(S): at 05:11

## 2021-01-01 RX ADMIN — Medication 1 TABLET(S): at 20:39

## 2021-01-01 RX ADMIN — Medication 650 MILLIGRAM(S): at 13:17

## 2021-01-01 RX ADMIN — GABAPENTIN 100 MILLIGRAM(S): 400 CAPSULE ORAL at 21:24

## 2021-01-01 RX ADMIN — Medication 1 TABLET(S): at 20:44

## 2021-01-01 RX ADMIN — ATORVASTATIN CALCIUM 40 MILLIGRAM(S): 80 TABLET, FILM COATED ORAL at 20:54

## 2021-01-01 RX ADMIN — Medication 2.5 MILLIGRAM(S): at 23:52

## 2021-01-01 RX ADMIN — Medication 12.5 MILLIGRAM(S): at 11:55

## 2021-01-01 RX ADMIN — Medication 100 MILLIGRAM(S): at 21:34

## 2021-01-01 RX ADMIN — Medication 200 MILLIGRAM(S): at 09:19

## 2021-01-01 RX ADMIN — Medication 1 SPRAY(S): at 22:07

## 2021-01-01 RX ADMIN — Medication 1 TABLET(S): at 20:40

## 2021-01-01 RX ADMIN — CEFEPIME 100 MILLIGRAM(S): 1 INJECTION, POWDER, FOR SOLUTION INTRAMUSCULAR; INTRAVENOUS at 15:48

## 2021-01-01 RX ADMIN — Medication 12.5 MILLIGRAM(S): at 00:25

## 2021-01-01 RX ADMIN — Medication 40 MILLIEQUIVALENT(S): at 13:58

## 2021-01-01 RX ADMIN — CEFEPIME 100 MILLIGRAM(S): 1 INJECTION, POWDER, FOR SOLUTION INTRAMUSCULAR; INTRAVENOUS at 14:33

## 2021-01-01 RX ADMIN — Medication 100 MILLIGRAM(S): at 05:22

## 2021-01-01 RX ADMIN — Medication 12.5 MILLIGRAM(S): at 05:41

## 2021-01-01 RX ADMIN — ATORVASTATIN CALCIUM 40 MILLIGRAM(S): 80 TABLET, FILM COATED ORAL at 21:56

## 2021-01-05 NOTE — ED PROVIDER NOTE - AGGRAVATING FACTORS
Marleny is a 34 year old female here for first obstetrics check.  She is      .  Gestational age 9w0d.      She denies bleeding.  She denies cramping  She reports mild nausea/no emesis  She complains of breast tenderness  She complains of significant fatigue  Last Pap smear was greater than 8 years ago but was normal.  Patient declines Pap smear today due to pregnancy.  Desires to wait until postpartum.  See Prenatal Flowsheet for additional comments.    Denies known Latex allergy or symptoms of Latex sensitivity.  Medications reviewed and updated.  Tobacco use verified 2017.  Health Maintenance Due   Topic Date Due   • DTaP/Tdap/Td Vaccine (1 - Tdap) 2001   • Cervical Cancer Screening HPV CO-Testing  2012     Patient would like communication of their results via:        Cell Phone:   Telephone Information:   Mobile [phone]     Okay to leave a message containing results? Yes    The following topics were discussed:   Introduced to [facility] information.   Discussed / encouraged prenatal classes.   ACOG Your Pregnancy & Birth 4th edition book given to pt.   Pt accepts HIV testing at this time.            none

## 2021-01-05 NOTE — ED PROVIDER NOTE - CARE PLAN
Principal Discharge DX:	Pneumonia of both lungs due to infectious organism, unspecified part of lung  Secondary Diagnosis:	Non-intractable vomiting with nausea  Secondary Diagnosis:	Atrial fibrillation with RVR  Secondary Diagnosis:	Hypertensive urgency

## 2021-01-05 NOTE — H&P ADULT - ASSESSMENT
84yo F, with PMH/o afib (on Coumadin), CVA and h/o aneurysm repair, infrarenal AAA, HTN, HLD, mitral valve prolapse, PVD of RLE s/p stents x3, rheumatoid arthritis, s/p hysterectomy, and L cataract, BIBEMS from home c/o diffuse abdominal pain, nausea, and vomiting of 2-day duration, likely 2/2 multifocal bilateral PNA and gallbladder disease based upon imaging results.

## 2021-01-05 NOTE — H&P ADULT - PROBLEM SELECTOR PLAN 2
- Patient reported pain under L breast, likely pleuritic pain due to PNA  - CXR demonstrating possible L perihilar infiltrate  - CTA chest demonstrating emphysema, multifocal bilateral opacities. No arterial filling defects but limited by motion artifact.  - S/p Zosyn and Vancomycin in the ED, continue upon admision  - F/u vanco trough pre ?th dose  - F/u urine strep and legionella Ag, f/u MRSA PCR  - Leukocytosis present, but patient afebrile - Patient reported pain under L breast, likely pleuritic pain due to PNA  - CXR demonstrating possible L perihilar infiltrate  - CTA chest demonstrating emphysema, multifocal bilateral opacities. No arterial filling defects but limited by motion artifact.  - S/p Zosyn and Vancomycin in the ED, continue upon admission  - F/u vanco trough pre 3rd dose  - F/u urine strep and legionella Ag, f/u MRSA PCR  - Leukocytosis present, patient afebrile - Patient reported pain under L breast, likely pleuritic pain due to PNA  - CXR demonstrating possible L perihilar infiltrate  - CTA chest demonstrating emphysema, multifocal bilateral opacities. No arterial filling defects but limited by motion artifact.  - S/p Zosyn and Vancomycin in the ED, continue upon admission  - F/u vanco trough pre 4th dose  - F/u urine strep and legionella Ag, f/u MRSA PCR  - Leukocytosis present, patient afebrile - Patient reported pain under L breast, likely pleuritic pain due to PNA  - CXR demonstrating possible L perihilar infiltrate  - CTA chest demonstrating emphysema, multifocal bilateral opacities. No arterial filling defects but limited by motion artifact.  - S/p Zosyn and Vancomycin in the ED, continue ZOsyn only upon admission  - F/u urine strep and legionella Ag, f/u MRSA PCR  - Leukocytosis present, patient afebrile  -ID consult Dr Anthony - Patient reported pain under L breast, likely pleuritic pain due to PNA  - CXR demonstrating possible L perihilar infiltrate  - CTA chest demonstrating emphysema, multifocal bilateral opacities. No arterial filling defects but limited by motion artifact.  - S/p Zosyn and Vancomycin in the ED, continue Zosyn only upon admission  - F/u urine strep and legionella Ag, f/u MRSA PCR  - Leukocytosis present, patient afebrile  - ID consult Dr Anthony

## 2021-01-05 NOTE — H&P ADULT - PROBLEM SELECTOR PLAN 9
- Pharm VTE ppx with Lovenox 40mg daily    IMPROVE VTE Individual Risk Assessment  --------------------------  11: HLD: chronic, takes atorvastatin 40mg qD at home, continue while inpatient          RISK                                                          Points  [  ] Previous VTE                                                3  [  ] Thrombophilia                                             2  [  ] Lower limb paralysis                                   2        (unable to hold up >15 seconds)    [  ] Current Cancer                                             2         (within 6 months)  [  ] Immobilization > 24 hrs                              1  [  ] ICU/CCU stay > 24 hours                             1  [ x ] Age > 60                                                         1    IMPROVE VTE Score: 1 - Pharm VTE ppx therapeutic INR on coumadin. Repeat INR tomorrow  IMPROVE VTE Individual Risk Assessment  --------------------------  11: HLD: chronic, takes atorvastatin 40mg qD at home, continue while inpatient          RISK                                                          Points  [  ] Previous VTE                                                3  [  ] Thrombophilia                                             2  [  ] Lower limb paralysis                                   2        (unable to hold up >15 seconds)    [  ] Current Cancer                                             2         (within 6 months)  [  ] Immobilization > 24 hrs                              1  [  ] ICU/CCU stay > 24 hours                             1  [ x ] Age > 60                                                         1    IMPROVE VTE Score: 1 - Chronic  - Takes atorvastatin 40mg qD at home, continue while inpatient

## 2021-01-05 NOTE — H&P ADULT - NSHPSOCIALHISTORY_GEN_ALL_CORE
Lives at home by herself with daily visits from [facility]  Denies tobacco, EtOH, or rec drug use  Ambulates with walker  Reportedly received flu and PNA vaccines

## 2021-01-05 NOTE — H&P ADULT - PROBLEM SELECTOR PLAN 7
- Known h/o fusiform infrarenal AAA without significant change last evaluated during 1/2020 admission by vascular surgery with no acute intervention at that time, recommended outpatient follow up

## 2021-01-05 NOTE — H&P ADULT - NSHPPHYSICALEXAM_GEN_ALL_CORE
T(C): 36.6 (01-05-21 @ 11:50), Max: 36.6 (01-05-21 @ 11:50)  HR: 138 (01-05-21 @ 13:50) (130 - 140)  BP: 180/108 (01-05-21 @ 13:50) (150/105 - 182/121)  RR: 18 (01-05-21 @ 13:18) (18 - 19)  SpO2: 92% (01-05-21 @ 13:18) (92% - 95%)    GENERAL: patient appears well, no acute distress, appropriate, pleasant  EYES: sclera clear, no exudates  ENMT: oropharynx clear without erythema, no exudates, moist mucous membranes  NECK: supple, soft, no thyromegaly noted  LUNGS: good air entry bilaterally, clear to auscultation, symmetric breath sounds, no wheezing or rhonchi appreciated  HEART: soft S1/S2, regular rate and rhythm, no murmurs noted, no lower extremity edema  GASTROINTESTINAL: abdomen is soft, nontender, nondistended, normoactive bowel sounds, no palpable masses  INTEGUMENT: good skin turgor, no lesions noted  MUSCULOSKELETAL: no clubbing or cyanosis, no obvious deformity  NEUROLOGIC: awake, alert, oriented x3, good muscle tone in 4 extremities, no obvious sensory deficits  PSYCHIATRIC: mood is good, affect is congruent, linear and logical thought process  HEME/LYMPH: no palpable supraclavicular nodules, no obvious ecchymosis or petechiae T(C): 36.6 (01-05-21 @ 11:50), Max: 36.6 (01-05-21 @ 11:50)  HR: 138 (01-05-21 @ 13:50) (130 - 140)  BP: 180/108 (01-05-21 @ 13:50) (150/105 - 182/121)  RR: 18 (01-05-21 @ 13:18) (18 - 19)  SpO2: 92% (01-05-21 @ 13:18) (92% - 95%)    GENERAL: patient in moderate distress from abd pain  EYES: sclera clear, no exudates  ENMT: oropharynx clear without erythema, no exudates, moist mucous membranes  NECK: supple, soft, no thyromegaly noted  LUNGS: good air entry bilaterally, clear to auscultation, symmetric breath sounds, no wheezing or rhonchi appreciated  HEART: soft S1/S2, irregular rhythm, no murmurs noted, no lower extremity edema  GASTROINTESTINAL: abdomen is soft, moderately TTP of RLQ with guarding, nondistended, normoactive bowel sounds, no palpable masses  INTEGUMENT: warm, dry, no lesions noted  MUSCULOSKELETAL: no clubbing or cyanosis, no obvious deformity  NEUROLOGIC: awake, alert, oriented x3, no obvious sensory or motor deficits  HEME/LYMPH: no palpable cervical nodules, no obvious ecchymosis or petechiae T(C): 36.6 (01-05-21 @ 11:50), Max: 36.6 (01-05-21 @ 11:50)  HR: 138 (01-05-21 @ 13:50) (130 - 140)  BP: 180/108 (01-05-21 @ 13:50) (150/105 - 182/121)  RR: 18 (01-05-21 @ 13:18) (18 - 19)  SpO2: 92% (01-05-21 @ 13:18) (92% - 95%)    GENERAL: patient in moderate distress from abd pain  EYES: sclera clear, no exudates  ENMT: oropharynx clear without erythema, no exudates, moist mucous membranes  NECK: supple, soft, no thyromegaly noted  LUNGS: good air entry bilaterally, clear to auscultation, symmetric breath sounds, no wheezing or rhonchi appreciated  HEART: soft S1/S2, irregular rhythm, no murmurs noted, no lower extremity edema  GASTROINTESTINAL: abdomen is soft, moderately TTP of RLQ with guarding, nondistended, normoactive bowel sounds, no palpable masses  INTEGUMENT: warm, dry, no lesions noted  MUSCULOSKELETAL: no clubbing or cyanosis, no obvious deformity  NEUROLOGIC: awake, alert, oriented x3, no obvious sensory or motor deficits  Vascular: 1+ PT pulses b/l  HEME/LYMPH: no palpable cervical nodules, no obvious ecchymosis or petechiae

## 2021-01-05 NOTE — H&P ADULT - HISTORY OF PRESENT ILLNESS
86yo F, with PMH/o afib (on Coumadin), CVA and h/o aneurysm repair, infrarenal AAA, HTN, HLD, mitral valve prolapse, PVD of RLE s/p stents x3, rheumatoid arthritis, s/p hysterectomy, and L cataract, BIBEMS from home c/o diffuse abdominal pain, nausea, and vomiting for the past 2 days. While being transported, EMS noted patient to be hypertensive ___ and tachycardic ___ and noted patient had not taken PO meds in the past 2 days due to abdominal symptoms. EMS administered Lopressor 5mg and ASA 325mg.     In the ED  VS: T 97.9  /121 RR 19 SpO2 95% on RA  Significant labs include WBC 13.57,   CXR: Suspect patchy L perihilar infiltrate  CT head: advanced periventricular and deep white matter ischemia, progressed from prior examination, multiple old lacunar infarctions in bilateral basal ganglia, aneurysm clip again noted, L temporal craniotomy/craniectomy  EKG:    Patient received IV Labetalol 20mg x1. 84yo F, with PMH/o afib (on Coumadin), CVA and h/o aneurysm repair, infrarenal AAA, HTN, HLD, mitral valve prolapse, PVD of RLE s/p stents x3, rheumatoid arthritis, s/p hysterectomy, and L cataract, BIBEMS from home c/o diffuse abdominal pain, nausea, and vomiting for the past 2 days. While being transported, EMS noted patient to be hypertensive ___ and tachycardic ___ and noted patient had not taken PO meds in the past 2 days due to abdominal symptoms. EMS administered Lopressor 5mg and ASA 325mg.     In the ED  VS: T 97.9  /121-->150/105-->180/108 RR 19 SpO2 95% on RA  Significant labs include WBC 13.57, lactate 2.7, BUN 37, TBili 2.0, alk phos 126, BNP 53593.  CXR: Suspect patchy L perihilar infiltrate  CTA chest/abd/pelvis: Emphysema, multifocal bilateral opacities. No arterial filling defects but limited by motion artifact.Cholelithiasis, distended gallbladder with trace gas. Nonobstructive intrarenal stones. Occluded R femoral bypass grafts.  CT head: advanced periventricular and deep white matter ischemia, progressed from prior examination, multiple old lacunar infarctions in bilateral basal ganglia, aneurysm clip again noted, L temporal craniotomy/craniectomy  EKG: afib with RVR, LAD, nonspecific ST/Tw abnormality    Patient received IV Labetalol 20mg x1, Zosyn, Vancomycin, and started on diltiazem gtt. 84yo F, with PMH/o afib (on Coumadin), CVA and h/o aneurysm repair, infrarenal AAA, HTN, HLD, mitral valve prolapse, PVD of RLE s/p stents x3, rheumatoid arthritis, s/p hysterectomy, and L cataract, BIBEMS from home c/o LUQ abdominal pain, nausea, and vomiting for the past 3 days. Patient describes vomit as pink with black and green colors. Patient's daughter does report h/o GI bleed last occurring 5-6 years ago. While being transported, EMS found patient to be hypertensive and tachycardic, noting patient had not taken PO meds in the past 2 days due to abdominal symptoms. EMS administered Lopressor 5mg and ASA 325mg.    In the ED  VS: T 97.9  /121-->150/105-->180/108 RR 19 SpO2 95% on RA  Significant labs include WBC 13.57, lactate 2.7, BUN 37, TBili 2.0, alk phos 126, BNP 06785.  CXR: Suspect patchy L perihilar infiltrate  CTA chest/abd/pelvis: Emphysema, multifocal bilateral opacities. No arterial filling defects but limited by motion artifact.Cholelithiasis, distended gallbladder with trace gas. Nonobstructive intrarenal stones. Occluded R femoral bypass grafts.  CT head: advanced periventricular and deep white matter ischemia, progressed from prior examination, multiple old lacunar infarctions in bilateral basal ganglia, aneurysm clip again noted, L temporal craniotomy/craniectomy  EKG: afib with RVR, LAD, nonspecific ST/Tw abnormality    Patient received IV Labetalol 20mg x1, Zosyn, Vancomycin, and started on diltiazem gtt.

## 2021-01-05 NOTE — PATIENT PROFILE ADULT - NSASFALLNEEDSASSISTWITH_GEN_A_NUR
Tahir Simon MD                         [phone]      Patient ID:    Name:  Aakash Sanchez Jr.    MRN:  [id number]    1937   80 y.o.  male            CC/Reason for visit: Lung nodule f/u    Subjective: Pt seen and examined in the am. No acute overnight events.  Spoke to daughter and granddaughter at bedside.  Patient is at baseline mental status now.      LOS: 3    ROS: Review of Systems   Denies any cough,fevers, chills, SOA, CP.    Objective     Vital Signs past 24hrs  BP range: BP: (104-130)/(56-91) 130/75  Pulse range: Heart Rate:  [] 104  Resp rate range: Resp:  [16-18] 16  Temp range: Temp (24hrs), Av.3 °F (37.4 °C), Min:98.1 °F (36.7 °C), Max:101.8 °F (38.8 °C)    O2 Device: room air     193 lb 4.8 oz (87.7 kg); Body mass index is 27.74 kg/(m^2).    Intake/Output Summary (Last 24 hours) at 10/04/17 1510  Last data filed at 10/04/17 1305   Gross per 24 hour   Intake              360 ml   Output              400 ml   Net              -40 ml       Exam:  GEN:  Obese WM in no acute distress. No focal deficits. AAO X 3.  EYES:   EOM-i, anicteric sclera bilat  ENT:    External ears/nose normal, OP clear  NECK:  Supple, midline trachea, No cervical LApathy  LUNGS: Bilateral breath sounds heard, No adventitious sounds  CV:  Regular rate and rhythm, No murmur  ABD:  Non tender, Obese, no palpable liver or masses  EXT:  No cyanosis or clubbing, no peripheral/sacral edema    Scheduled meds:      amiodarone 200 mg Oral Q12H   atorvastatin 40 mg Oral Nightly   digoxin 125 mcg Oral Every Other Day   fluconazole 200 mg Intravenous Daily   isosorbide mononitrate 15 mg Oral Daily   lactobacillus acidophilus 1 capsule Oral Daily   metoprolol tartrate 100 mg Oral Q12H   pantoprazole 40 mg Oral Daily     IV meds:                           Data Review:        Results from last 7 days  Lab Units 10/04/17  0404 10/03/17  0342 10/02/17  0516 10/01/17  0603 17  2250    SODIUM mmol/L 138 138 137 141 141   POTASSIUM mmol/L 4.0 3.4* 3.6 3.5 4.1   CHLORIDE mmol/L 106 103 103 105 100   CO2 mmol/L 20.9* 19.7* 18.3* 19.9* 23.8   BUN mg/dL 15 15 16 17 18   CREATININE mg/dL 2.12* 2.14* 2.08* 1.81* 2.28*   CALCIUM mg/dL 8.3* 8.1* 7.8* 7.9* 8.9   BILIRUBIN mg/dL  --   --   --   --  1.0   ALK PHOS U/L  --   --   --   --  73   ALT (SGPT) U/L  --   --   --   --  12   AST (SGOT) U/L  --   --   --   --  16   GLUCOSE mg/dL 148* 144* 122* 134* 130*   WBC 10*3/mm3 5.53 6.27 7.33 8.06 8.98   HEMOGLOBIN g/dL 10.7* 10.9* 11.1* 11.6* 12.7*   PLATELETS 10*3/mm3 182 189 172 202 221   INR  3.28* 2.95* 2.58* 2.53* 2.58*   PROCALCITONIN ng/mL 0.23  --   --  0.12  --        Lab Results   Component Value Date    CALCIUM 8.3 (L) 10/04/2017    PHOS 2.5 10/04/2017         Results from last 7 days  Lab Units 10/03/17  1901 10/03/17  1830 10/02/17  1319 10/01/17  0134 10/01/17  0100 09/30/17  2253   BLOODCX  No growth at less than 24 hours No growth at less than 24 hours  --  No growth at 3 days No growth at 3 days  --    URINECX   --   --    50,000 CFU/mL Yeast, Not Candida albicans*  --   --    25,000 CFU/mL Yeast, Not Candida albicans*                       Estimated Creatinine Clearance: 31 mL/min (by C-G formula based on Cr of 2.12).    Results Review:    I have reviewed the laboratory and imaging data since the last note by [facility] physician.                    IMPRESSION:  1. 3.8 cm solid-appearing lung mass anterior-inferior left upper lobe  suspicious for neoplasm. Tissue sampling recommended.  2. 0.6 cm right upper lobe pulmonary nodule. 1.9 cm right middle lobe  groundglass nodule.  3. Small left pleural effusion.  4. Cardiomegaly. Mild splenomegaly.      ECHO 6/5/17  Interpretation Summary   · Left ventricular systolic function is moderately decreased. Calculated EF = 26.9%. Estimated EF was in disagreement with the calculated EF. Estimated EF = 35%. The left ventricular cavity is borderline dilated.  Left ventricular wall thickness is consistent with moderate concentric hypertrophy. Left ventricular diastolic dysfunction is noted (grade I) consistent with impaired relaxation.  · The following segments are akinetic: basal inferolateral. The following segments are hypokinetic: basal inferior, mid inferior, mid inferolateral and apical lateral. The following segments are normal: basal anterior, basal anteroseptal, basal inferoseptal, mid anterior, mid anteroseptal, mid inferoseptal, apical anterior, apical septal, apical inferior and apex. Other segments could not be evaluated.  · Left atrial volume is severely increased. Saline test results are negative.  · Right atrial cavity size is moderate-to-severely dilated.  · There is mild-to-moderate thickening of the aortic valve.  · Mild mitral annular calcification is present. Mild mitral valve regurgitation is present.  · Mild tricuspid valve regurgitation is present. Estimated right ventricular systolic pressure from tricuspid regurgitation is normal (<35 mmHg).             Assessment:  3.8cm JT lung mass  6 mm right upper lobe lung nodule  1.9 cm right middle lobe groundglass nodule  Toxic metabolic encephalopathy - resolved ? sepsis  History of recurrent pneumonia   H/o ? 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  While the patient has history of prostate cancer I suspect this could be a primary lung cancer.  His right middle lobe lung nodule has been stable since 2014.  No clear evidence of this nodule noted on the CAT scan 01/17 but he had significant abnormality in that lung at that point making it difficult to delineate this mass from  the surrounding infiltrates.   In any case this nodule needs to be biopsied.  I did not see any significant lymphadenopathy.  We could offer the patient navigation bronchoscopy if we do not find any other source of sampling.  CT abdomen pelvis does not show any evidence of metastatic disease.  MRI of the brain cannot be done because of the devices being not compatible.  CT head is neg. TME resolved now. ?r/t UTI.    Appreciate cardiology holding Coumadin and Plavix.  We will plan for procedure once INR less than 1.4 and Plavix held for 5 days.  Patient does not need to be in the hospital for the same.  I spoke to the daughter in detail and answered all questions.  Will follow along with you.    Tahir Simon MD  10/4/2017   standing/walking

## 2021-01-05 NOTE — H&P ADULT - PROBLEM SELECTOR PLAN 10
- No pharm VTE ppx as currently therapeutic INR on coumadin. Repeat INR tomorrow. L SCD for now.    IMPROVE VTE Individual Risk Assessment        RISK                                                          Points  [  ] Previous VTE                                                3  [  ] Thrombophilia                                             2  [  ] Lower limb paralysis                                   2        (unable to hold up >15 seconds)    [  ] Current Cancer                                             2         (within 6 months)  [  ] Immobilization > 24 hrs                              1  [  ] ICU/CCU stay > 24 hours                             1  [ x ] Age > 60                                                         1    IMPROVE VTE Score: 1

## 2021-01-05 NOTE — CONSULT NOTE ADULT - SUBJECTIVE AND OBJECTIVE BOX
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    Osteoarthritis    Afib    PVD (peripheral vascular disease)    Neuropathy  (Right lower leg)    H/O cerebral aneurysm repair  brain clips    CVA (cerebral vascular accident)  (&quot;Mini-stroke&quot;,1990&#x27;s)    Rheumatoid arthritis    Hyperlipidemia    Hypertension    S/P cataract surgery  (Left eye)    Elective surgery  (&quot;Twisted bowel&quot;, 2014)    Elective surgery  (Exision of cyst on liver, 1985)    S/P ORIF (open reduction internal fixation) fracture  Left hip fx (2012) &amp; R hip fx (2013)    H/O cerebral aneurysm repair  Brain clips (1978(    Renal stone  Cysto stent placement 10/1/2014    PVD (peripheral vascular disease)  s/p RLE bypass x 3, most recent 3/2012 Right external iliac to PT bypass w/ PTFE (2012)    S/P FRED (total abdominal hysterectomy)  (1987, Hx of &quot;ovarian cancer?&quot;)        SOCIAL HISTORY: No active tobacco, alcohol or illicit drug use    FAMILY HISTORY:  No pertinent family history in first degree relatives        Outpatient medications:    MEDICATIONS  (STANDING):  diltiazem Infusion 10 mG/Hr (10 mL/Hr) IV Continuous <Continuous>    MEDICATIONS  (PRN):      Allergies    No Known Allergies    Intolerances        REVIEW OF SYSTEMS: Is negative for eye, ENT, GI, , allergic, dermatologic, musculoskeletal and neurologic, except as described above.    VITAL SIGNS:   Vital Signs Last 24 Hrs  T(C): 36.6 (05 Jan 2021 11:50), Max: 36.6 (05 Jan 2021 11:50)  T(F): 97.9 (05 Jan 2021 11:50), Max: 97.9 (05 Jan 2021 11:50)  HR: 130 (05 Jan 2021 13:23) (130 - 140)  BP: 150/105 (05 Jan 2021 13:23) (150/105 - 182/121)  BP(mean): --  RR: 18 (05 Jan 2021 13:18) (18 - 19)  SpO2: 92% (05 Jan 2021 13:18) (92% - 95%)    I&O's Summary      PHYSICAL EXAM:  Constitutional: NAD, awake and alert, well-developed  Eyes:  EOMI, no oral cyanosis, conjunctivae clear, anicteric  Pulmonary: Non-labored, breath sounds are clear bilaterally, no wheezing, rales or rhonchi  Cardiovascular:  irregular S1 and S2, in af, tachy normal rate. No murmur.  No rubs, gallops or clicks  Gastrointestinal: Bowel Sounds present, soft, nontender  Lymph: No peripheral edema   Neurological: Alert, strength and sensitivity are grossly intact  Skin: No obvious lesions/rashes  Psych:  Mood & affect appropriate    LABS: All Labs Reviewed:                        11.9   13.57 )-----------( 216      ( 05 Jan 2021 12:42 )             37.4     05 Jan 2021 12:42    141    |  108    |  35     ----------------------------<  127    3.9     |  23     |  1.30     Ca    8.8        05 Jan 2021 12:42    TPro  7.6    /  Alb  3.4    /  TBili  2.0    /  DBili  x      /  AST  22     /  ALT  25     /  AlkPhos  126    05 Jan 2021 12:42    PT/INR - ( 05 Jan 2021 12:42 )   PT: 27.7 sec;   INR: 2.47 ratio           CARDIAC MARKERS ( 05 Jan 2021 12:42 )  .027 ng/mL / x     / x     / x     / 3.9 ng/mL      Blood Culture:   01-05 @ 12:42  Pro Bnp [id number]    01-05 @ 12:42  TSH: 2.41      RADIOLOGY:    EKG:   [facility]         Dahiana Bañuelos, Evens, Toney, Terrie, Filiberto Laughlin        [phone] (office)    HPI: Patient is a 85y old Female who presents with a PMHx of HTN, HLD, Afib on coumadin, CVA and hx of aneurysm repair, Mitral Valve Prolapse, Peripheral Artery Disease s/p 2 stents in RLE, presents with 2 day hx of nausea and vomiting with diffuse lower abdominal tenderness. Patient has a history of worsening dementia and patient's daughter, Dixie Schmitt ([phone]), was contacted for history. Per daughter, patient had numerous episodes of vomiting Sunday (1/3) night into Monday (1/4), with one episode of diarrhea. Patient's home aid came on Monday where she reported finding a noticeable abdominal swelling. Patient was lethargic and slept most of Monday, but complained of diffuse abdominal pain. Daughter states that patient was diagnosed with "bleeding ulcers at the same time as her Afib and that is why she on Coumadin."  Patient was unable to take any of her medications for the last two days.    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    Osteoarthritis    Afib    PVD (peripheral vascular disease)    Neuropathy  (Right lower leg)    H/O cerebral aneurysm repair  brain clips    CVA (cerebral vascular accident)  (&quot;Mini-stroke&quot;,1990&#x27;s)    Rheumatoid arthritis    Hyperlipidemia    Hypertension    S/P cataract surgery  (Left eye)    Elective surgery  (&quot;Twisted bowel&quot;, 2014)    Elective surgery  (Exision of cyst on liver, 1985)    S/P ORIF (open reduction internal fixation) fracture  Left hip fx (2012) &amp; R hip fx (2013)    H/O cerebral aneurysm repair  Brain clips (1978(    Renal stone  Cysto stent placement 10/1/2014    PVD (peripheral vascular disease)  s/p RLE bypass x 3, most recent 3/2012 Right external iliac to PT bypass w/ PTFE (2012)    S/P FRED (total abdominal hysterectomy)  (1987, Hx of &quot;ovarian cancer?&quot;)        SOCIAL HISTORY: No active tobacco, alcohol or illicit drug use    FAMILY HISTORY:  No pertinent family history in first degree relatives    Home Medications:  aspirin 81 mg oral delayed release tablet: 1 tab(s) orally once a day (27 Dec 2019 19:04)  atorvastatin 40 mg oral tablet: 1 tab(s) orally once a day (at bedtime) (27 Dec 2019 19:04)  Coumadin 2.5 mg oral tablet: 1 tab(s) orally once a day Monday-Saturday (27 Dec 2019 19:04)  Coumadin 5 mg oral tablet: 1 tab(s) orally once a day for 1 days - sunday (27 Dec 2019 19:04)  dilTIAZem 240 mg/24 hours oral capsule, extended release: 1 cap(s) orally once a day (04 Jan 2020 12:48)  hydrALAZINE 100 mg oral tablet: 1 tab(s) orally every 8 hours (04 Jan 2020 12:48)  ipratropium-albuterol 0.5 mg-2.5 mg/3 mLinhalation solution: 3 milliliter(s) inhaled every 8 hours (04 Jan 2020 12:48)  melatonin 5 mg oral tablet: 1 tab(s) orally once a day (at bedtime), As Needed (27 Dec 2019 19:04)  metoprolol succinate 100 mg oral tablet, extended release: 1.5 tab(s) orally once a day (27 Dec 2019 19:04)  multivitamin:   once a day (27 Dec 2019 19:04)  pantoprazole 40 mg oral delayed release tablet: 1 tab(s) orally every 12 hours (27 Dec 2019 19:04)  polyethylene glycol 3350 oral powder for reconstitution: 17 gram(s) orally once a day (04 Jan 2020 12:48)  senna oral tablet: 2 tab(s) orally once a day (at bedtime) (27 Dec 2019 19:04)  sucralfate 1 g oral tablet: 1 tab(s) orally 2 times a day (04 Jan 2020 12:48)      MEDICATIONS  (STANDING):  diltiazem Infusion 10 mG/Hr (10 mL/Hr) IV Continuous <Continuous>    MEDICATIONS  (PRN):      Allergies    No Known Allergies    Intolerances      REVIEW OF SYSTEMS: limited due to patient distracted by abdominal pain   General: well developed, in mild distress, endorses chills   Respiratory: Endorses SOB  Cardiovascular: Denies chest pain, palpitations   Gastrointestinal:	Endorses abdominal pain   Extremities: Nonedematous, no cyanosis    Neurological: Follows commands and answers questions appropriately     VITAL SIGNS:   Vital Signs Last 24 Hrs  T(C): 36.6 (05 Jan 2021 11:50), Max: 36.6 (05 Jan 2021 11:50)  T(F): 97.9 (05 Jan 2021 11:50), Max: 97.9 (05 Jan 2021 11:50)  HR: 130 (05 Jan 2021 13:23) (130 - 140)  BP: 150/105 (05 Jan 2021 13:23) (150/105 - 182/121)  BP(mean): --  RR: 18 (05 Jan 2021 13:18) (18 - 19)  SpO2: 92% (05 Jan 2021 13:18) (92% - 95%)    I&O's Summary    PHYSICAL EXAM:  Constitutional: NAD, awake and alert, clear distress   Eyes: Cataract in Right eye   Pulmonary: Non-labored, breath sounds are clear bilaterally, no wheezing, rales or rhonchi  Cardiovascular:  Irregularly irregualr heart rate, +S1/S2, no murmurs or rubs   Gastrointestinal: Normoactive bowel Sounds present +4, soft, + point tenderness to palpation in Left Upper Quadrant, diffuse abdominal tenderness to palpation   Lymph: No peripheral edema   Neurological: Alert, follows commands and answers questions appropriately   Skin: Pale, cool and dry lower extremities     LABS: All Labs Reviewed:                        11.9   13.57 )-----------( 216      ( 05 Jan 2021 12:42 )             37.4     05 Jan 2021 12:42    141    |  108    |  35     ----------------------------<  127    3.9     |  23     |  1.30     Ca    8.8        05 Jan 2021 12:42    TPro  7.6    /  Alb  3.4    /  TBili  2.0    /  DBili  x      /  AST  22     /  ALT  25     /  AlkPhos  126    05 Jan 2021 12:42    PT/INR - ( 05 Jan 2021 12:42 )   PT: 27.7 sec;   INR: 2.47 ratio           CARDIAC MARKERS ( 05 Jan 2021 12:42 )  .027 ng/mL / x     / x     / x     / 3.9 ng/mL      Blood Culture:   01-05 @ 12:42  Pro Bnp [id number]    01-05 @ 12:42  TSH: 2.41      RADIOLOGY:    < from: Xray Chest 1 View- PORTABLE-Urgent (01.05.21 @ 12:44) >    EXAM:  XR CHEST PORTABLE URGENT 1V                          PROCEDURE DATE:  01/05/2021      INTERPRETATION:  Chest pain.    AP chest. Prior 12/20/2019.    Heart magnified by projection. Atherosclerotic aorta. Suspect patchy left perihilar infiltrate. Recommend clinical correlation close follow-up. No pleural effusion or pneumothorax.    Impression: Suspect left perihilar infiltrate. Recommend close follow-up    ELIZABETH MICHELLE MD; Attending Radiologist  This document has been electronically signed. Jan 5 2021 12:52PM    < end of copied text >    < from: CT Head No Cont (01.05.21 @ 12:36) >    EXAM:  CT BRAIN                            PROCEDURE DATE:  01/05/2021     INTERPRETATION:  CT head without IV contrast    CLINICAL INFORMATION: Hypertensive emergency    TECHNIQUE: Contiguous axial 5 mm sections were obtained through the head. Sagittal and coronal 2-D reformatted images were also obtained.   This scan was performed using automatic exposure control (radiation dose reduction software) to obtain a diagnostic image quality scan with patient dose as low as reasonably achievable.    FINDINGS:   CT dated 12/28/2019 available for review.    The brain demonstrates advanced periventricular and deep white matter ischemia, progressed from prior examination. Focal encephalomalacia and gliosis in the posterior LEFT frontal lobe is unchanged. Multiple old lacunar infarctions are seen in the BILATERAL basal ganglia. Aneurysm clip again noted in the LEFT C2 suprasellar cistern.   No acute cerebral cortical infarct is seen.  No intracranial hemorrhage is found.  No mass effect is found in the brain.    The ventricles, sulci and basal cisterns appear unremarkable.    The orbits are unremarkable.  The paranasal sinuses are clear.  The nasal cavity appears intact.  The nasopharynx is symmetric.  The central skull base, petrous temporal bones and calvarium show LEFT temporal craniotomy/craniectomy.      IMPRESSION:   advanced periventricular and deep white matter ischemia, progressed from prior examination. Focal encephalomalacia and gliosis in the posterior LEFT frontal lobe is unchanged. Multiple old lacunar infarctions are seen in the BILATERAL basal ganglia. Aneurysm clip again noted in the LEFT C2 suprasellar cistern.    LEFT temporal craniotomy/craniectomy.      SHANIA LEON MD; Attending Radiologist  This document has been electronically signed. Jan 5 2021 12:39PM    < end of copied text >    EKG: Afib with RVR    [facility]         Dahiana Bañuelos, Evens, Toney, Terrie, Filiberto Laughlin        [phone] (office)    HPI: Patient is a 85y old Female who presents with a PMHx of HTN, HLD, Afib on coumadin, CVA and hx of aneurysm repair, Mitral Valve Prolapse, Peripheral Artery Disease s/p 2 stents in RLE, presents with 2 day hx of nausea and vomiting with diffuse lower abdominal tenderness. Patient has a history of worsening dementia and patient's daughter, Dixie Schmitt ([phone]), was contacted for history. Per daughter, patient had numerous episodes of vomiting Sunday (1/3) night into Monday (1/4), with one episode of diarrhea. Patient's home aid came on Monday where she reported finding a noticeable abdominal swelling. Patient was lethargic and slept most of Monday, but complained of diffuse abdominal pain. Daughter states that patient was diagnosed with "bleeding ulcers at the same time as her Afib and that is why she on Coumadin."  Patient was unable to take any of her medications for the last two days.  Patients cardiologist is Dr. 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    Osteoarthritis    Afib    PVD (peripheral vascular disease)    Neuropathy  (Right lower leg)    H/O cerebral aneurysm repair  brain clips    CVA (cerebral vascular accident)  (&quot;Mini-stroke&quot;,1990&#x27;s)    Rheumatoid arthritis    Hyperlipidemia    Hypertension    S/P cataract surgery  (Left eye)    Elective surgery  (&quot;Twisted bowel&quot;, 2014)    Elective surgery  (Exision of cyst on liver, 1985)    S/P ORIF (open reduction internal fixation) fracture  Left hip fx (2012) &amp; R hip fx (2013)    H/O cerebral aneurysm repair  Brain clips (1978(    Renal stone  Cysto stent placement 10/1/2014    PVD (peripheral vascular disease)  s/p RLE bypass x 3, most recent 3/2012 Right external iliac to PT bypass w/ PTFE (2012)    S/P FRED (total abdominal hysterectomy)  (1987, Hx of &quot;ovarian cancer?&quot;)        SOCIAL HISTORY: No active tobacco, alcohol or illicit drug use    FAMILY HISTORY:  No pertinent family history in first degree relatives    Home Medications:  aspirin 81 mg oral delayed release tablet: 1 tab(s) orally once a day (27 Dec 2019 19:04)  atorvastatin 40 mg oral tablet: 1 tab(s) orally once a day (at bedtime) (27 Dec 2019 19:04)  Coumadin 2.5 mg oral tablet: 1 tab(s) orally once a day Monday-Saturday (27 Dec 2019 19:04)  Coumadin 5 mg oral tablet: 1 tab(s) orally once a day for 1 days - sunday (27 Dec 2019 19:04)  dilTIAZem 240 mg/24 hours oral capsule, extended release: 1 cap(s) orally once a day (04 Jan 2020 12:48)  hydrALAZINE 100 mg oral tablet: 1 tab(s) orally every 8 hours (04 Jan 2020 12:48)  ipratropium-albuterol 0.5 mg-2.5 mg/3 mLinhalation solution: 3 milliliter(s) inhaled every 8 hours (04 Jan 2020 12:48)  melatonin 5 mg oral tablet: 1 tab(s) orally once a day (at bedtime), As Needed (27 Dec 2019 19:04)  metoprolol succinate 100 mg oral tablet, extended release: 1.5 tab(s) orally once a day (27 Dec 2019 19:04)  multivitamin:   once a day (27 Dec 2019 19:04)  pantoprazole 40 mg oral delayed release tablet: 1 tab(s) orally every 12 hours (27 Dec 2019 19:04)  polyethylene glycol 3350 oral powder for reconstitution: 17 gram(s) orally once a day (04 Jan 2020 12:48)  senna oral tablet: 2 tab(s) orally once a day (at bedtime) (27 Dec 2019 19:04)  sucralfate 1 g oral tablet: 1 tab(s) orally 2 times a day (04 Jan 2020 12:48)      MEDICATIONS  (STANDING):  diltiazem Infusion 10 mG/Hr (10 mL/Hr) IV Continuous <Continuous>    MEDICATIONS  (PRN):      Allergies    No Known Allergies    Intolerances      REVIEW OF SYSTEMS: limited due to patient distracted by abdominal pain   General: well developed, in mild distress, endorses chills   Respiratory: Endorses SOB  Cardiovascular: Denies chest pain, palpitations   Gastrointestinal:	Endorses abdominal pain   Extremities: Nonedematous, no cyanosis    Neurological: Follows commands and answers questions appropriately     VITAL SIGNS:   Vital Signs Last 24 Hrs  T(C): 36.6 (05 Jan 2021 11:50), Max: 36.6 (05 Jan 2021 11:50)  T(F): 97.9 (05 Jan 2021 11:50), Max: 97.9 (05 Jan 2021 11:50)  HR: 130 (05 Jan 2021 13:23) (130 - 140)  BP: 150/105 (05 Jan 2021 13:23) (150/105 - 182/121)  BP(mean): --  RR: 18 (05 Jan 2021 13:18) (18 - 19)  SpO2: 92% (05 Jan 2021 13:18) (92% - 95%)    I&O's Summary    PHYSICAL EXAM:  Constitutional: NAD, awake and alert, clear distress   Eyes: Cataract in Right eye   Pulmonary: Non-labored, breath sounds are clear bilaterally, no wheezing, rales or rhonchi  Cardiovascular:  Irregularly irregualr heart rate, +S1/S2, no murmurs or rubs   Gastrointestinal: Normoactive bowel Sounds present +4, soft, + point tenderness to palpation in Left Upper Quadrant, diffuse abdominal tenderness to palpation   Lymph: No peripheral edema   Neurological: Alert, follows commands and answers questions appropriately   Skin: Pale, cool and dry lower extremities     LABS: All Labs Reviewed:                        11.9   13.57 )-----------( 216      ( 05 Jan 2021 12:42 )             37.4     05 Jan 2021 12:42    141    |  108    |  35     ----------------------------<  127    3.9     |  23     |  1.30     Ca    8.8        05 Jan 2021 12:42    TPro  7.6    /  Alb  3.4    /  TBili  2.0    /  DBili  x      /  AST  22     /  ALT  25     /  AlkPhos  126    05 Jan 2021 12:42    PT/INR - ( 05 Jan 2021 12:42 )   PT: 27.7 sec;   INR: 2.47 ratio           CARDIAC MARKERS ( 05 Jan 2021 12:42 )  .027 ng/mL / x     / x     / x     / 3.9 ng/mL      Blood Culture:   01-05 @ 12:42  Pro Bnp [id number]    01-05 @ 12:42  TSH: 2.41      RADIOLOGY:    < from: Xray Chest 1 View- PORTABLE-Urgent (01.05.21 @ 12:44) >    EXAM:  XR CHEST PORTABLE URGENT 1V                          PROCEDURE DATE:  01/05/2021      INTERPRETATION:  Chest pain.    AP chest. Prior 12/20/2019.    Heart magnified by projection. Atherosclerotic aorta. Suspect patchy left perihilar infiltrate. Recommend clinical correlation close follow-up. No pleural effusion or pneumothorax.    Impression: Suspect left perihilar infiltrate. Recommend close follow-up    ELIZABETH MICHELLE MD; Attending Radiologist  This document has been electronically signed. Jan 5 2021 12:52PM    < end of copied text >    < from: CT Head No Cont (01.05.21 @ 12:36) >    EXAM:  CT BRAIN                            PROCEDURE DATE:  01/05/2021     INTERPRETATION:  CT head without IV contrast    CLINICAL INFORMATION: Hypertensive emergency    TECHNIQUE: Contiguous axial 5 mm sections were obtained through the head. Sagittal and coronal 2-D reformatted images were also obtained.   This scan was performed using automatic exposure control (radiation dose reduction software) to obtain a diagnostic image quality scan with patient dose as low as reasonably achievable.    FINDINGS:   CT dated 12/28/2019 available for review.    The brain demonstrates advanced periventricular and deep white matter ischemia, progressed from prior examination. Focal encephalomalacia and gliosis in the posterior LEFT frontal lobe is unchanged. Multiple old lacunar infarctions are seen in the BILATERAL basal ganglia. Aneurysm clip again noted in the LEFT C2 suprasellar cistern.   No acute cerebral cortical infarct is seen.  No intracranial hemorrhage is found.  No mass effect is found in the brain.    The ventricles, sulci and basal cisterns appear unremarkable.    The orbits are unremarkable.  The paranasal sinuses are clear.  The nasal cavity appears intact.  The nasopharynx is symmetric.  The central skull base, petrous temporal bones and calvarium show LEFT temporal craniotomy/craniectomy.      IMPRESSION:   advanced periventricular and deep white matter ischemia, progressed from prior examination. Focal encephalomalacia and gliosis in the posterior LEFT frontal lobe is unchanged. Multiple old lacunar infarctions are seen in the BILATERAL basal ganglia. Aneurysm clip again noted in the LEFT C2 suprasellar cistern.    LEFT temporal craniotomy/craniectomy.      SHANIA LEON MD; Attending Radiologist  This document has been electronically signed. Jan 5 2021 12:39PM    < end of copied text >    EKG: Afib with RVR

## 2021-01-05 NOTE — CONSULT NOTE ADULT - SUBJECTIVE AND OBJECTIVE BOX
84yo F with PMHx of afib (on Coumadin), CVA and h/o aneurysm repair (1970's), infrarenal AAA, HTN, HLD, mitral valve prolapse, PVD of RLE s/p stents x3, rheumatoid arthritis, s/p hysterectomy, s/p ERCP 11/17, 7/18, s/p SBO with SBR and SADIE (Dr. Lamb, 2015), BIBEMS from home c/o diffuse abdominal pain, nausea, and vomiting for the past 2 days. Pt is mildly confused at this time. Pt states she has been vomiting dark green non-stop for past 2-3 days and is now lethargic 2/2 inability to sleep. Admits to LUQ abdominal pain described as diffuse, sore.  Pt's daughter was called to obtain further medical history. Daughter confirmed mother's story, adds she had one episode of diarrhea. Denies CP, SOB.      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    Osteoarthritis    Afib    PVD (peripheral vascular disease)    Neuropathy  (Right lower leg)    H/O cerebral aneurysm repair  brain clips    CVA (cerebral vascular accident)  (&quot;Mini-stroke&quot;,1990&#x27;s)    Rheumatoid arthritis    Hyperlipidemia    Hypertension    S/P cataract surgery  (Left eye)    Elective surgery  (&quot;Twisted bowel&quot;, 2014)    Elective surgery  (Exision of cyst on liver, 1985)    S/P ORIF (open reduction internal fixation) fracture  Left hip fx (2012) &amp; R hip fx (2013)    H/O cerebral aneurysm repair  Brain clips (1978(    Renal stone  Cysto stent placement 10/1/2014    PVD (peripheral vascular disease)  s/p RLE bypass x 3, most recent 3/2012 Right external iliac to PT bypass w/ PTFE (2012)    S/P FRED (total abdominal hysterectomy)  (1987, Hx of &quot;ovarian cancer?&quot;)      Allergies:  No Known Allergies    Home Medications:  aspirin 81 mg oral delayed release tablet: 1 tab(s) orally once a day  atorvastatin 40 mg oral tablet: 1 tab(s) orally once a day (at bedtime)  Coumadin 2.5 mg oral tablet: 1 tab(s) orally once a day for 5 days a week  Coumadin 5 mg oral tablet: 1 tab(s) orally 2 times a week on Wednesday and Sunday  dilTIAZem 240 mg/24 hours oral capsule, extended release: 1 cap(s) orally once a day  gabapentin 100 mg oral capsule: 1 cap(s) orally 3 times a day  hydrALAZINE 10 mg oral tablet: 1 tab(s) orally 3 times a day  metoprolol succinate 100 mg oral tablet, extended release: 1 tab(s) orally once a day  multivitamin: 1 tab(s) orally once a day  pantoprazole 40 mg oral delayed release tablet: 1 tab(s) orally every 12 hours  Vitamin C: 1 tab(s) orally once a day      Family History:  FAMILY HISTORY:  No pertinent family history in first degree relatives        ROS:  Constitutional: Admits to lethargy. Denies fever, or weight loss.  Skin: Denies rash.  Eyes: Denies recent vision problems or eye pain.  ENT: Denies congestion, ear pain, or sore throat.  Endocrine: Denies thyroid problems.  Cardiovascular: Denies chest pain or palpation.  Respiratory: Denies cough, shortness of breath, congestion, or wheezing.  Gastrointestinal: +abdominal pain, nausea, vomiting, and 1 episode of diarrhea.  Genitourinary: Denies dysuria.  Musculoskeletal: Denies joint swelling.  Neurologic: Denies headache.    PHYSICAL EXAM:  GENERAL: No acute distress, well-developed  HEAD:  Atraumatic, Normocephalic  ABDOMEN: Soft, non-tender, non-distended; bowel sounds+  NEUROLOGY: A&O x 3, no focal deficits    Data:  T(C): 36.7 (01-05-21 @ 16:47), Max: 36.7 (01-05-21 @ 16:47)  HR: 120 (01-05-21 @ 16:47) (120 - 140)  BP: 169/90 (01-05-21 @ 16:47) (150/105 - 182/121)  RR: 18 (01-05-21 @ 16:47) (18 - 19)  SpO2: 94% (01-05-21 @ 16:47) (92% - 95%)                        11.9   13.57 )-----------( 216      ( 05 Jan 2021 12:42 )             37.4     01-05    141  |  108  |  35<H>  ----------------------------<  127<H>  3.9   |  23  |  1.30    Ca    8.8      05 Jan 2021 12:42    TPro  7.6  /  Alb  3.4  /  TBili  2.0<H>  /  DBili  x   /  AST  22  /  ALT  25  /  AlkPhos  126<H>  01-05      LIVER FUNCTIONS - ( 05 Jan 2021 12:42 )  Alb: 3.4 g/dL / Pro: 7.6 g/dL / ALK PHOS: 126 U/L / ALT: 25 U/L / AST: 22 U/L / GGT: x           Radiology:  < from: CT Angio Chest w/ IV Cont (01.05.21 @ 14:23) >  FINDINGS:  CHEST:  LUNGS AND LARGE AIRWAYS: Patent central airways. Emphysema. Right middle, right lower, and left upper lobe opacities.  PLEURA: No pleural effusion.  VESSELS: Atherosclerotic calcifications of thoracic aorta and coronary arteries. Multiple images are degraded by respiratory motion. Excluding regions with motion artifact, there are no pulmonary arterial filling defects identified.  HEART: Enlarged. No pericardial effusion.  MEDIASTINUM AND ALEN: Mild adenopathy.  CHEST WALL AND LOWER NECK: 1 x 1.2 cm left thyroid hypodensity.    ABDOMEN AND PELVIS:  LIVER: Multiple subcentimeter hepatic hypodensities, too small to further characterize.  BILE DUCTS: Mild dilatation.  GALLBLADDER: Cholelithiasis. Distended. Trace gas noted in the gallbladder.  SPLEEN: Within normal limits.  PANCREAS: Within normal limits.  ADRENALS: Nodular thickening.  KIDNEYS/URETERS: Nonobstructive intrarenal calculi measuring up to 6 mm. Subcentimeter renal hypodensities, too small to further characterize. No hydronephrosis.    BLADDER: Within normal limits.  REPRODUCTIVE ORGANS: Hysterectomy.    BOWEL: No bowel obstruction. Ileocecal anastomosis. Colonic diverticulosis.  PERITONEUM: No ascites.  VESSELS: Atherosclerotic changes. 2.6 x 2.8 cm abdominal aortic saccular aneurysm. Partially imaged right femoral bypass grafts, which appear occluded.  RETROPERITONEUM/LYMPH NODES: No lymphadenopathy.  ABDOMINAL WALL: Subcutaneous soft tissue edema.  BONES: Degenerative changes. Bilateral hip ORIF.    IMPRESSION:    1. Emphysema.  2. Multifocal pneumonia.  3. Negative for pulmonary embolism.  4. Cholelithiasis and distended gallbladder.  5. Colonic diverticulosis without CT evidence of acute diverticulitis.  6. No bowel obstruction.  7. Abdominal aortic saccular aneurysm (2.6 x 2.8 cm).  8. Right femoral bypass grafts, which appear occluded.    GORDON MARIANO MD; Attending Radiologist  This documenthas been electronically signed. Jan 5 2021  3:17PM    < end of copied text >      Assessment:     This is a 85y year old Female with extensive PMHx presenting with abdominal pain 2/2 n/v, lethargy, heart failure and radiologic evidence of PNA (possibly 2/2 aspiration), cholelithiasis and distended GB.    Plan:  -Discussed with Dr. Shultz  -No acute surgical intervention at this time, symptoms are unlikely 2/2 GB pathology, additionally, patient is likely too unstable at this time to tolerate OR.  -HIDA ordered to r/o GB dysfunction  -NPO, IVF, supportive care  -IV antibiotics started  -Care per primary team    Surgical Team Contact Information  Spectralink: Ext: [phone] or [phone]  Pager: [phone]   84yo F with PMHx of afib (on Coumadin), CVA and h/o aneurysm repair (1970's), infrarenal AAA, HTN, HLD, mitral valve prolapse, PVD of RLE s/p stents x3, rheumatoid arthritis, s/p hysterectomy, s/p ERCP 11/17, 7/18, s/p SBO with SBR and SADIE (Dr. Lamb, 2015), BIBEMS from home c/o diffuse abdominal pain, nausea, and vomiting for the past 2 days. Pt is mildly confused at this time. Pt states she has been vomiting dark green non-stop for past 2-3 days and is now lethargic 2/2 inability to sleep. Admits to LUQ abdominal pain described as diffuse, sore.  Pt's daughter was called to obtain further medical history. Daughter confirmed mother's story, adds she had one episode of diarrhea. Denies CP, SOB.      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    Osteoarthritis    Afib    PVD (peripheral vascular disease)    Neuropathy  (Right lower leg)    H/O cerebral aneurysm repair  brain clips    CVA (cerebral vascular accident)  (&quot;Mini-stroke&quot;,1990&#x27;s)    Rheumatoid arthritis    Hyperlipidemia    Hypertension    S/P cataract surgery  (Left eye)    Elective surgery  (&quot;Twisted bowel&quot;, 2014)    Elective surgery  (Exision of cyst on liver, 1985)    S/P ORIF (open reduction internal fixation) fracture  Left hip fx (2012) &amp; R hip fx (2013)    H/O cerebral aneurysm repair  Brain clips (1978(    Renal stone  Cysto stent placement 10/1/2014    PVD (peripheral vascular disease)  s/p RLE bypass x 3, most recent 3/2012 Right external iliac to PT bypass w/ PTFE (2012)    S/P FRED (total abdominal hysterectomy)  (1987, Hx of &quot;ovarian cancer?&quot;)      Allergies:  No Known Allergies    Home Medications:  aspirin 81 mg oral delayed release tablet: 1 tab(s) orally once a day  atorvastatin 40 mg oral tablet: 1 tab(s) orally once a day (at bedtime)  Coumadin 2.5 mg oral tablet: 1 tab(s) orally once a day for 5 days a week  Coumadin 5 mg oral tablet: 1 tab(s) orally 2 times a week on Wednesday and Sunday  dilTIAZem 240 mg/24 hours oral capsule, extended release: 1 cap(s) orally once a day  gabapentin 100 mg oral capsule: 1 cap(s) orally 3 times a day  hydrALAZINE 10 mg oral tablet: 1 tab(s) orally 3 times a day  metoprolol succinate 100 mg oral tablet, extended release: 1 tab(s) orally once a day  multivitamin: 1 tab(s) orally once a day  pantoprazole 40 mg oral delayed release tablet: 1 tab(s) orally every 12 hours  Vitamin C: 1 tab(s) orally once a day      Family History:  FAMILY HISTORY:  No pertinent family history in first degree relatives        ROS:  Constitutional: Admits to lethargy. Denies fever, or weight loss.  Skin: Denies rash.  Eyes: Denies recent vision problems or eye pain.  ENT: Denies congestion, ear pain, or sore throat.  Endocrine: Denies thyroid problems.  Cardiovascular: Denies chest pain or palpation.  Respiratory: Denies cough, shortness of breath, congestion, or wheezing.  Gastrointestinal: +abdominal pain, nausea, vomiting, and 1 episode of diarrhea.  Genitourinary: Denies dysuria.  Musculoskeletal: Denies joint swelling.  Neurologic: Denies headache.    PHYSICAL EXAM:  GENERAL: No acute distress, mildly confused, well-developed  HEAD:  Atraumatic, Normocephalic  ABDOMEN: Soft, diffuse mild tenderness, minimally-distended; bowel sounds+ multiple well healed incisional scars     Data:  T(C): 36.7 (01-05-21 @ 16:47), Max: 36.7 (01-05-21 @ 16:47)  HR: 120 (01-05-21 @ 16:47) (120 - 140)  BP: 169/90 (01-05-21 @ 16:47) (150/105 - 182/121)  RR: 18 (01-05-21 @ 16:47) (18 - 19)  SpO2: 94% (01-05-21 @ 16:47) (92% - 95%)                        11.9   13.57 )-----------( 216      ( 05 Jan 2021 12:42 )             37.4     01-05    141  |  108  |  35<H>  ----------------------------<  127<H>  3.9   |  23  |  1.30    Ca    8.8      05 Jan 2021 12:42    TPro  7.6  /  Alb  3.4  /  TBili  2.0<H>  /  DBili  x   /  AST  22  /  ALT  25  /  AlkPhos  126<H>  01-05      LIVER FUNCTIONS - ( 05 Jan 2021 12:42 )  Alb: 3.4 g/dL / Pro: 7.6 g/dL / ALK PHOS: 126 U/L / ALT: 25 U/L / AST: 22 U/L / GGT: x           Radiology:  < from: CT Angio Chest w/ IV Cont (01.05.21 @ 14:23) >  FINDINGS:  CHEST:  LUNGS AND LARGE AIRWAYS: Patent central airways. Emphysema. Right middle, right lower, and left upper lobe opacities.  PLEURA: No pleural effusion.  VESSELS: Atherosclerotic calcifications of thoracic aorta and coronary arteries. Multiple images are degraded by respiratory motion. Excluding regions with motion artifact, there are no pulmonary arterial filling defects identified.  HEART: Enlarged. No pericardial effusion.  MEDIASTINUM AND ALEN: Mild adenopathy.  CHEST WALL AND LOWER NECK: 1 x 1.2 cm left thyroid hypodensity.    ABDOMEN AND PELVIS:  LIVER: Multiple subcentimeter hepatic hypodensities, too small to further characterize.  BILE DUCTS: Mild dilatation.  GALLBLADDER: Cholelithiasis. Distended. Trace gas noted in the gallbladder.  SPLEEN: Within normal limits.  PANCREAS: Within normal limits.  ADRENALS: Nodular thickening.  KIDNEYS/URETERS: Nonobstructive intrarenal calculi measuring up to 6 mm. Subcentimeter renal hypodensities, too small to further characterize. No hydronephrosis.    BLADDER: Within normal limits.  REPRODUCTIVE ORGANS: Hysterectomy.    BOWEL: No bowel obstruction. Ileocecal anastomosis. Colonic diverticulosis.  PERITONEUM: No ascites.  VESSELS: Atherosclerotic changes. 2.6 x 2.8 cm abdominal aortic saccular aneurysm. Partially imaged right femoral bypass grafts, which appear occluded.  RETROPERITONEUM/LYMPH NODES: No lymphadenopathy.  ABDOMINAL WALL: Subcutaneous soft tissue edema.  BONES: Degenerative changes. Bilateral hip ORIF.    IMPRESSION:    1. Emphysema.  2. Multifocal pneumonia.  3. Negative for pulmonary embolism.  4. Cholelithiasis and distended gallbladder.  5. Colonic diverticulosis without CT evidence of acute diverticulitis.  6. No bowel obstruction.  7. Abdominal aortic saccular aneurysm (2.6 x 2.8 cm).  8. Right femoral bypass grafts, which appear occluded.    GORDON MARIANO MD; Attending Radiologist  This documenthas been electronically signed. Jan 5 2021  3:17PM    < end of copied text >      Assessment:     This is a 85y year old Female with extensive PMHx presenting with abdominal pain 2/2 n/v, lethargy, heart failure and radiologic evidence of PNA (possibly 2/2 aspiration), cholelithiasis and distended GB.    Plan:  -Discussed with Dr. Shultz  -No acute surgical intervention at this time, symptoms are unlikely 2/2 GB pathology, additionally, patient is likely too unstable at this time to tolerate OR.  -HIDA ordered to r/o GB dysfunction  -NPO, IVF, supportive care  -IV antibiotics started  -Care per primary team    Surgical Team Contact Information  Spectralink: Ext: [phone] or [phone]  Pager: [phone]   86yo F with PMHx of afib (on Coumadin), CVA and h/o aneurysm repair (1970's), infrarenal AAA, HTN, HLD, mitral valve prolapse, PVD of RLE s/p stents x3, rheumatoid arthritis, s/p hysterectomy, s/p ERCP 11/17, 7/18, s/p SBO with SBR and SADIE (Dr. Lamb, 2015), BIBEMS from home c/o diffuse abdominal pain, nausea, and vomiting for the past 2 days. Pt is mildly confused at this time. Pt states she has been vomiting dark green non-stop for past 2-3 days and is now lethargic 2/2 inability to sleep. Admits to LUQ abdominal pain described as diffuse, sore.  Pt's daughter was called to obtain further medical history. Daughter confirmed mother's story, adds she had one episode of diarrhea. Denies CP, SOB.      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    Osteoarthritis    Afib    PVD (peripheral vascular disease)    Neuropathy  (Right lower leg)    H/O cerebral aneurysm repair  brain clips    CVA (cerebral vascular accident)  (&quot;Mini-stroke&quot;,1990&#x27;s)    Rheumatoid arthritis    Hyperlipidemia    Hypertension    S/P cataract surgery  (Left eye)    Elective surgery  (&quot;Twisted bowel&quot;, 2014)    Elective surgery  (Exision of cyst on liver, 1985)    S/P ORIF (open reduction internal fixation) fracture  Left hip fx (2012) &amp; R hip fx (2013)    H/O cerebral aneurysm repair  Brain clips (1978(    Renal stone  Cysto stent placement 10/1/2014    PVD (peripheral vascular disease)  s/p RLE bypass x 3, most recent 3/2012 Right external iliac to PT bypass w/ PTFE (2012)    S/P FRED (total abdominal hysterectomy)  (1987, Hx of &quot;ovarian cancer?&quot;)      Allergies:  No Known Allergies    Home Medications:  aspirin 81 mg oral delayed release tablet: 1 tab(s) orally once a day  atorvastatin 40 mg oral tablet: 1 tab(s) orally once a day (at bedtime)  Coumadin 2.5 mg oral tablet: 1 tab(s) orally once a day for 5 days a week  Coumadin 5 mg oral tablet: 1 tab(s) orally 2 times a week on Wednesday and Sunday  dilTIAZem 240 mg/24 hours oral capsule, extended release: 1 cap(s) orally once a day  gabapentin 100 mg oral capsule: 1 cap(s) orally 3 times a day  hydrALAZINE 10 mg oral tablet: 1 tab(s) orally 3 times a day  metoprolol succinate 100 mg oral tablet, extended release: 1 tab(s) orally once a day  multivitamin: 1 tab(s) orally once a day  pantoprazole 40 mg oral delayed release tablet: 1 tab(s) orally every 12 hours  Vitamin C: 1 tab(s) orally once a day      Family History:  FAMILY HISTORY:  No pertinent family history in first degree relatives        ROS:  Constitutional: Admits to lethargy. Denies fever, or weight loss.  Skin: Denies rash.  Eyes: Denies recent vision problems or eye pain.  ENT: Denies congestion, ear pain, or sore throat.  Endocrine: Denies thyroid problems.  Cardiovascular: Denies chest pain or palpation.  Respiratory: Denies cough, shortness of breath, congestion, or wheezing.  Gastrointestinal: +abdominal pain primarily LUQ, nausea, vomiting, and 1 episode of diarrhea.  Genitourinary: Denies dysuria.  Musculoskeletal: Denies joint swelling.  Neurologic: Denies headache.    PHYSICAL EXAM:  GENERAL: Elderly woman lying comfortably in bed in no acute distress, mildly confused, well-developed  HEAD:  Atraumatic, Normocephalic  ABDOMEN: Soft, diffuse mild tenderness, minimally-distended; bowel sounds+ multiple well healed incisional scars .  No Hawkins's sign.  No obvious hernia or palpable masses.    Data:  T(C): 36.7 (01-05-21 @ 16:47), Max: 36.7 (01-05-21 @ 16:47)  HR: 120 (01-05-21 @ 16:47) (120 - 140)  BP: 169/90 (01-05-21 @ 16:47) (150/105 - 182/121)  RR: 18 (01-05-21 @ 16:47) (18 - 19)  SpO2: 94% (01-05-21 @ 16:47) (92% - 95%)                        11.9   13.57 )-----------( 216      ( 05 Jan 2021 12:42 )             37.4     01-05    141  |  108  |  35<H>  ----------------------------<  127<H>  3.9   |  23  |  1.30    Ca    8.8      05 Jan 2021 12:42    TPro  7.6  /  Alb  3.4  /  TBili  2.0<H>  /  DBili  x   /  AST  22  /  ALT  25  /  AlkPhos  126<H>  01-05      LIVER FUNCTIONS - ( 05 Jan 2021 12:42 )  Alb: 3.4 g/dL / Pro: 7.6 g/dL / ALK PHOS: 126 U/L / ALT: 25 U/L / AST: 22 U/L / GGT: x           Radiology:  < from: CT Angio Chest w/ IV Cont (01.05.21 @ 14:23) >  FINDINGS:  CHEST:  LUNGS AND LARGE AIRWAYS: Patent central airways. Emphysema. Right middle, right lower, and left upper lobe opacities.  PLEURA: No pleural effusion.  VESSELS: Atherosclerotic calcifications of thoracic aorta and coronary arteries. Multiple images are degraded by respiratory motion. Excluding regions with motion artifact, there are no pulmonary arterial filling defects identified.  HEART: Enlarged. No pericardial effusion.  MEDIASTINUM AND ALEN: Mild adenopathy.  CHEST WALL AND LOWER NECK: 1 x 1.2 cm left thyroid hypodensity.    ABDOMEN AND PELVIS:  LIVER: Multiple subcentimeter hepatic hypodensities, too small to further characterize.  BILE DUCTS: Mild dilatation.  GALLBLADDER: Cholelithiasis. Distended. Trace gas noted in the gallbladder.  SPLEEN: Within normal limits.  PANCREAS: Within normal limits.  ADRENALS: Nodular thickening.  KIDNEYS/URETERS: Nonobstructive intrarenal calculi measuring up to 6 mm. Subcentimeter renal hypodensities, too small to further characterize. No hydronephrosis.    BLADDER: Within normal limits.  REPRODUCTIVE ORGANS: Hysterectomy.    BOWEL: No bowel obstruction. Ileocecal anastomosis. Colonic diverticulosis.  PERITONEUM: No ascites.  VESSELS: Atherosclerotic changes. 2.6 x 2.8 cm abdominal aortic saccular aneurysm. Partially imaged right femoral bypass grafts, which appear occluded.  RETROPERITONEUM/LYMPH NODES: No lymphadenopathy.  ABDOMINAL WALL: Subcutaneous soft tissue edema.  BONES: Degenerative changes. Bilateral hip ORIF.    IMPRESSION:    1. Emphysema.  2. Multifocal pneumonia.  3. Negative for pulmonary embolism.  4. Cholelithiasis and distended gallbladder.  5. Colonic diverticulosis without CT evidence of acute diverticulitis.  6. No bowel obstruction.  7. Abdominal aortic saccular aneurysm (2.6 x 2.8 cm).  8. Right femoral bypass grafts, which appear occluded.    GORDON MARIANO MD; Attending Radiologist  This documenthas been electronically signed. Jan 5 2021  3:17PM    < end of copied text >      Assessment:     This is a 85y year old Female with extensive PMHx presenting with abdominal pain 2/2 n/v, lethargy, heart failure and radiologic evidence of PNA (possibly 2/2 aspiration), cholelithiasis and distended GB.    Plan:  -Discussed with Dr. Shultz  -No acute surgical intervention at this time, symptoms are unlikely 2/2 GB pathology.  -HIDA ordered to r/o GB dysfunction.  - Given poor functional status and comorbid conditions, should HIDA be suggestive of acute cholecystitis I would consider IR consultation for Percutaneous Cholecystostomy.  -NPO, IVF, supportive care  -IV antibiotics started  -Care per primary team in management of PNA, CHF, Peripheral Vascular Disease.    Surgical Team Contact Information  Spectralink: Ext: [phone] or [phone]  Pager: [phone]

## 2021-01-05 NOTE — CONSULT NOTE ADULT - ASSESSMENT
- Patient is not complaining of any cardiac symptoms at this time.  - No clear evidence of acute ischemia, trops negative x 2. Will follow up third set.    - No acute changes on EKG compared to previous.  - No meaningful evidence of volume overload.  - Previous TTE shows ___.  - BP well controlled, monitor routine hemodynamics.    - Monitor and replete lytes, keep K>4, Mg>2.  - Strict I/Os, daily weights.    - His CKs are flat, suggesting against acute atherosclerotic plaque rupture.  - Biomarker trend is not consistent with plaque rupture but rather demand ischemia. Monitor closely for the development of anginal symptoms or clinical signs of ischemia.     - Other cardiovascular workup will depend on clinical course.  - All other workup per primary team.  - Will continue to follow.             yo F/M with PMH of    Doubt ACS, pain likely pleuritic vs. musculoskeletal.    - No clear evidence of acute ischemia, trops negative x2. Will f/u third set, patient asymptomatic.  - CKs are flat, which suggests against acute atherosclerotic plaque rupture.  - No evidence of volume overload.  - No acute changes on EKG compared to previous.  - Previous echo in 02/17 shows normal LV function with EF: __%, no significant valvular disease noted.  - BP well controlled, continue home BP meds, monitor routine hemodynamics.  - Monitor and replete lytes, keep K>4, Mg>2.  - Patient has no active ischemia, decompensated HF, unstable arrythmia, or severe stenotic valvular disease, and in the setting of low risk --- procedure, patient is optimized from cardiovascular standpoint to proceed with planned procedure with routine hemodynamic monitoring.   - Patient has no modifiable active cardiac risk factors (elevated trops x3, moderate-severe ASD), and in the setting of low risk Bronchoscopy, patient is optimized as best as possible from cardiovascular standpoint to proceed with planned procedure with routine hemodynamic monitoring.   - Other cardiovascular workup will depend on clinical course. All other workup per primary team.  - Will follow. Patient is a 85y old Female who presents with a PMHx of HTN, HLD, Afib on coumadin, CVA and hx of aneurysm repair, Mitral Valve Prolapse, Peripheral Artery Disease s/p 2 stents in RLE, presents with 2 day hx of nausea and vomiting with diffuse lower abdominal tenderness.      - Patient is not complaining of any cardiac symptoms at this time.  - No clear evidence of acute ischemia, trops negative x 2. Will follow up third set.    - No acute changes on EKG compared to previous.  - No meaningful evidence of volume overload.  - Previous TTE shows ___.  - BP well controlled, monitor routine hemodynamics.    - Monitor and replete lytes, keep K>4, Mg>2.  - Strict I/Os, daily weights.    - His CKs are flat, suggesting against acute atherosclerotic plaque rupture.  - Biomarker trend is not consistent with plaque rupture but rather demand ischemia. Monitor closely for the development of anginal symptoms or clinical signs of ischemia.     - Other cardiovascular workup will depend on clinical course.  - All other workup per primary team.  - Will continue to follow.             yo F/M with PMH of    Doubt ACS, pain likely pleuritic vs. musculoskeletal.    - No clear evidence of acute ischemia, trops negative x2. Will f/u third set, patient asymptomatic.  - CKs are flat, which suggests against acute atherosclerotic plaque rupture.  - No evidence of volume overload.  - No acute changes on EKG compared to previous.  - Previous echo in 02/17 shows normal LV function with EF: __%, no significant valvular disease noted.  - BP well controlled, continue home BP meds, monitor routine hemodynamics.  - Monitor and replete lytes, keep K>4, Mg>2.  - Patient has no active ischemia, decompensated HF, unstable arrythmia, or severe stenotic valvular disease, and in the setting of low risk --- procedure, patient is optimized from cardiovascular standpoint to proceed with planned procedure with routine hemodynamic monitoring.   - Patient has no modifiable active cardiac risk factors (elevated trops x3, moderate-severe ASD), and in the setting of low risk Bronchoscopy, patient is optimized as best as possible from cardiovascular standpoint to proceed with planned procedure with routine hemodynamic monitoring.   - Other cardiovascular workup will depend on clinical course. All other workup per primary team.  - Will follow. Patient is a 85y old Female who presents with a PMHx of HTN, HLD, Afib on coumadin, CVA and hx of aneurysm repair, Mitral Valve Prolapse, Peripheral Artery Disease s/p 2 stents in RLE, presents with 2 day hx of nausea and vomiting with diffuse lower abdominal tenderness.    # A.Fib with RVR:  - Patient has a hx of A.fib with rate control and is on coumadin 2.5mg 5 days a week  - On presentation to the ED patient was found have a increased HR in 130'-140's  - EKG shows A.fib with RVR, no other acute changes noted  - Trops -ve  - currently patient has been on diltiazem 240mg 1 qd   - patient states that she is unable to eat anything 2/2 to vomiting and abdominal pain.  - If patient is unable to take PO medications will switch to IV Cardizem drip  - start patient on IV lopressor 2.5mg q6 to control elevated heart rate  - further management depending on clinical course.     - Patient is not complaining of any cardiac symptoms at this time.  - No clear evidence of acute ischemia, trops negative x 2. Will follow up third set.    - No acute changes on EKG compared to previous.  - No meaningful evidence of volume overload.  - Previous TTE shows ___.  - BP well controlled, monitor routine hemodynamics.    - Monitor and replete lytes, keep K>4, Mg>2.  - Strict I/Os, daily weights.    - His CKs are flat, suggesting against acute atherosclerotic plaque rupture.  - Biomarker trend is not consistent with plaque rupture but rather demand ischemia. Monitor closely for the development of anginal symptoms or clinical signs of ischemia.     - Other cardiovascular workup will depend on clinical course.  - All other workup per primary team.  - Will continue to follow.             yo F/M with PMH of    Doubt ACS, pain likely pleuritic vs. musculoskeletal.    - No clear evidence of acute ischemia, trops negative x2. Will f/u third set, patient asymptomatic.  - CKs are flat, which suggests against acute atherosclerotic plaque rupture.  - No evidence of volume overload.  - No acute changes on EKG compared to previous.  - Previous echo in 02/17 shows normal LV function with EF: __%, no significant valvular disease noted.  - BP well controlled, continue home BP meds, monitor routine hemodynamics.  - Monitor and replete lytes, keep K>4, Mg>2.  - Patient has no active ischemia, decompensated HF, unstable arrythmia, or severe stenotic valvular disease, and in the setting of low risk --- procedure, patient is optimized from cardiovascular standpoint to proceed with planned procedure with routine hemodynamic monitoring.   - Patient has no modifiable active cardiac risk factors (elevated trops x3, moderate-severe ASD), and in the setting of low risk Bronchoscopy, patient is optimized as best as possible from cardiovascular standpoint to proceed with planned procedure with routine hemodynamic monitoring.   - Other cardiovascular workup will depend on clinical course. All other workup per primary team.  - Will follow. Patient is a 85y old Female who presents with a PMHx of HTN, HLD, Afib on coumadin, CVA and hx of aneurysm repair, Mitral Valve Prolapse, Peripheral Artery Disease s/p 2 stents in RLE, presents with 2 day hx of nausea and vomiting with diffuse lower abdominal tenderness.    # A.Fib with RVR:  - Patient has a hx of A.fib with rate control and is on coumadin 2.5mg 5 days a week  - On presentation to the ED patient was found have a increased HR in 130'-140's  - EKG shows A.fib with RVR, no other acute changes noted  - Patient is not complaining of any cardiac symptoms at this time.  - Trops -ve  - currently patient has been on diltiazem 240mg 1 qd   - patient states that she is unable to eat anything 2/2 to vomiting and abdominal pain.  - If patient is unable to take PO medications will switch to IV Cardizem drip  - start patient on IV lopressor 2.5mg q6 to control elevated heart rate  - further management depending on clinical course.  - Will follow     # HTN:  Chronic unstable  - patient presented with elevated BP of 182/121  - Patient informs that she was not able to keep her medications down because of vomiting and abdominal pain.  - on hydralazine 10mg 1 po tid, also on diltiazem 240mg 1 qd at home  - will continue for now, but recommend to switch to IV if patient is unable to tolerate PO  - continue routine hemodynamic monitoring.    # elevated BNP:  - Patient has no hx of heart failure  - BNP elevated 34792, could be 2/2 to elevated HTN  - No meaningful evidence of volume overload.  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 Patient got another bypass  - On coumadin   - will hold coumadin for now as patient has hx of GIB and is currently unable to keep anything down because of vomiting and abdominal pain.   - Recommend CT abdomin to r/o mesenteric ischemia  - will follow.     - Other cardiovascular workup will depend on clinical course.  - will follow   Patient is a 85y old Female who presents with a PMHx of HTN, HLD, Afib on coumadin, CVA and hx of aneurysm repair, Mitral Valve Prolapse, Peripheral Artery Disease s/p 2 stents in RLE, presents with 2 day hx of nausea and vomiting with diffuse lower abdominal tenderness.    # A.Fib with RVR:  - Patient has a hx of A.fib (though has most recently been in SR) and is on coumadin 2.5mg 5 days a week  - On presentation to the ED patient was found have a increased HR in 130'-140's  - EKG shows A.fib with RVR, no other acute changes noted  - Patient is not complaining of any cardiac symptoms at this time.  - Troponin is negative x 1  - patient has been on diltiazem and lopressor at home though has been unable to eat anything 2/2 to vomiting and abdominal pain.  - If patient is unable to take PO medications will switch to IV Cardizem drip at 5 mg /hr  - start patient on IV lopressor 2.5mg q6 to control elevated heart rate  - further management depending on clinical course.  - Will follow with you    # HTN:  Chronic unstable  - patient presented with elevated BP of 182/121  - Patient informs that she was not able to keep her medications down because of vomiting and abdominal pain.  - on hydralazine 10mg po tid, also on diltiazem 240mg 1 qd at home  - will continue for now, but recommend to switch to IV if patient is unable to tolerate PO  - continue routine hemodynamic monitoring.    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 Patient got another bypass  - On coumadin   - will hold coumadin for now as patient has hx of GIB and is currently unable to keep anything down because of vomiting and abdominal pain.   - Recommend CT abdomen to r/o mesenteric ischemia  - will follow.     - Other cardiovascular workup will depend on clinical course.  - will follow

## 2021-01-05 NOTE — H&P ADULT - PROBLEM SELECTOR PLAN 5
- Chronic h/o HTN, now severe with BP >180/120 with EMS  - Likely due to missed PO doses  - S/p Lopressor 5mg with EMS and Labetalol 20mg in the ED  - Takes Hydralazine ?100mg q8h, diltiazem 240mg qD, and ?metoprolol succinate 150mg qD at home, patient currently unable to tolerate PO meds  - Cardio recommending IV Lopressor 2.5mg q6h and cardizem gtt with hold parameters  - CT head noncon obtained to evaluate for end-organ damage: advanced periventricular and deep white matter ischemia, progressed from prior examination, multiple old lacunar infarctions  - Monitor routine hemodynamics - Chronic h/o HTN, now severe with BP >180/120 with EMS, likely due to missed PO doses  - S/p Lopressor 5mg with EMS and Labetalol 20mg in the ED  - Takes Hydralazine 10mg q8h, diltiazem 240mg qD, and metoprolol succinate 100mg qD at home, patient currently unable to tolerate PO meds  - Cardio recommending IV Lopressor 2.5mg q6h and cardizem gtt with hold parameters  - CT head noncon obtained to evaluate for end-organ damage: advanced periventricular and deep white matter ischemia, progressed from prior examination, multiple old lacunar infarctions  - Monitor routine hemodynamics

## 2021-01-05 NOTE — CONSULT NOTE ADULT - ATTENDING COMMENTS
Evaluated at the bedside. BLE warm. Denies LE pain. CT reviewed. No further intervention or vascular eval is needed.
Seen/examined. agree with above.  ctpa with possible multifocal pna  af with rvr in the setting of this  unable to tolerate po so can start lopressor 2.5 iv q6r and iv cardizem gtt for rate control  check echocardiogram  hold off on aggressive diuresis

## 2021-01-05 NOTE — ED PROVIDER NOTE - OBJECTIVE STATEMENT
86 y/o F from home with hx of HTN, HLD, Afib on coumadin with c/o nausea vomiting and diffuse abd pain x 2 days.  While in the ambulance pt was found to be hypertensive and tachy.  pt was given metoprolol 5mg and ASA 325mg.      PCP: Dr. Lockhart  Cardio: Dr. Laughlin

## 2021-01-05 NOTE — H&P ADULT - PROBLEM SELECTOR PLAN 8
- H/o CVA and aneurysm repair  - ?Residual weakness  - CT head on this admission with advanced periventricular and deep white matter ischemia, progressed from prior examination, multiple old lacunar infarctions - H/o CVA and aneurysm repair, no residual weakness  - CT head on this admission with advanced periventricular and deep white matter ischemia, progressed from prior examination, multiple old lacunar infarctions - H/o CVA and aneurysm repair, no residual weakness  - CT head on this admission with advanced periventricular and deep white matter ischemia, progressed from prior examination, multiple old lacunar infarctions  -continue ASA and Lipitor

## 2021-01-05 NOTE — ED ADULT NURSE NOTE - OBJECTIVE STATEMENT
Received pt alert pt was complaining of vomiting x 3days and abd tenderness. Pt is in afib at the moment. Pt denies SOB. Pt is in the stretcher. Bloods taken and sent pt went for tests. Will continue to monitor. Call bell within reach. Freq rounding performed. Pt is complaining of abd pain right now. Will continue to monitor.

## 2021-01-05 NOTE — CONSULT NOTE ADULT - SUBJECTIVE AND OBJECTIVE BOX
84yo F with PMHx of afib (on Coumadin), CVA, infrarenal AAA h/o aneurysm repair (1970's), HTN, HLD, mitral valve prolapse, PVD of RLE s/p stents x3, rheumatoid arthritis, infrarenal AAA s/p aneurysm repair (1970's), s/p RLE angio (11/17, Dr. Mcknight), (8/12, Dr. Nur), BIBEMS from home c/o diffuse abdominal pain, nausea, and vomiting for the past 2 days. Pt's daughter was called to obtain further medical history. Daughter confirmed mother's story, adds she had one episode of diarrhea. Incidental finding on CT of R femoral bypass grafts occlusion. Denies CP, SOB.      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    Osteoarthritis    Afib    PVD (peripheral vascular disease)    Neuropathy  (Right lower leg)    H/O cerebral aneurysm repair  brain clips    CVA (cerebral vascular accident)  (&quot;Mini-stroke&quot;,1990&#x27;s)    Rheumatoid arthritis    Hyperlipidemia    Hypertension    S/P cataract surgery  (Left eye)    Elective surgery  (&quot;Twisted bowel&quot;, 2014)    Elective surgery  (Exision of cyst on liver, 1985)    S/P ORIF (open reduction internal fixation) fracture  Left hip fx (2012) &amp; R hip fx (2013)    H/O cerebral aneurysm repair  Brain clips (1978(    Renal stone  Cysto stent placement 10/1/2014    PVD (peripheral vascular disease)  s/p RLE bypass x 3, most recent 3/2012 Right external iliac to PT bypass w/ PTFE (2012)    S/P FRED (total abdominal hysterectomy)  (1987, Hx of &quot;ovarian cancer?&quot;)      Allergies:  No Known Allergies    Home Medications:  aspirin 81 mg oral delayed release tablet: 1 tab(s) orally once a day  atorvastatin 40 mg oral tablet: 1 tab(s) orally once a day (at bedtime)  Coumadin 2.5 mg oral tablet: 1 tab(s) orally once a day for 5 days a week  Coumadin 5 mg oral tablet: 1 tab(s) orally 2 times a week on Wednesday and Sunday  dilTIAZem 240 mg/24 hours oral capsule, extended release: 1 cap(s) orally once a day  gabapentin 100 mg oral capsule: 1 cap(s) orally 3 times a day  hydrALAZINE 10 mg oral tablet: 1 tab(s) orally 3 times a day  metoprolol succinate 100 mg oral tablet, extended release: 1 tab(s) orally once a day  multivitamin: 1 tab(s) orally once a day  pantoprazole 40 mg oral delayed release tablet: 1 tab(s) orally every 12 hours  Vitamin C: 1 tab(s) orally once a day      Family History:  FAMILY HISTORY:  No pertinent family history in first degree relatives        ROS:  Constitutional: Admits to lethargy. Denies fever, or weight loss.  Skin: Denies rash.  Eyes: Denies recent vision problems or eye pain.  ENT: Denies congestion, ear pain, or sore throat.  Endocrine: Denies thyroid problems.  Cardiovascular: Denies chest pain or palpation.  Respiratory: Denies cough, shortness of breath, congestion, or wheezing.  Gastrointestinal: +abdominal pain, nausea, vomiting, and 1 episode of diarrhea.  Genitourinary: Denies dysuria.  Musculoskeletal: Denies joint swelling.  Neurologic: Denies headache.    PHYSICAL EXAM:  GENERAL: No acute distress, well-developed  HEAD:  Atraumatic, Normocephalic  L/R GROIN: dressing non-saturated. No signs of swelling, surrounding tissue soft, no signs of erythema or fluid collection.  LOWER EXTREM: DP & PT nonpalpable bilaterally, Capillary refill >2sec b/l.  No pedal hair b/l. Dopplers reveal DP and PT pulses b/l. hypertrophic nails b/l.   DERM: swelling with 1cm dry superficial lesion at the dorsilateral aspect of the 5th digit. No erythema.  MSK:  5/5 strength at all muscle groups crossing ankle joint b/l.  NEURO: Grossly intact, however diminished b/l particularly more distally and at the toes.    Data:  T(C): 36.7 (01-05-21 @ 16:47), Max: 36.7 (01-05-21 @ 16:47)  HR: 120 (01-05-21 @ 16:47) (120 - 140)  BP: 169/90 (01-05-21 @ 16:47) (150/105 - 182/121)  RR: 18 (01-05-21 @ 16:47) (18 - 19)  SpO2: 94% (01-05-21 @ 16:47) (92% - 95%)                        11.9   13.57 )-----------( 216      ( 05 Jan 2021 12:42 )             37.4     01-05    141  |  108  |  35<H>  ----------------------------<  127<H>  3.9   |  23  |  1.30    Ca    8.8      05 Jan 2021 12:42    TPro  7.6  /  Alb  3.4  /  TBili  2.0<H>  /  DBili  x   /  AST  22  /  ALT  25  /  AlkPhos  126<H>  01-05      LIVER FUNCTIONS - ( 05 Jan 2021 12:42 )  Alb: 3.4 g/dL / Pro: 7.6 g/dL / ALK PHOS: 126 U/L / ALT: 25 U/L / AST: 22 U/L / GGT: x           Radiology:  < from: CT Angio Chest w/ IV Cont (01.05.21 @ 14:23) >  FINDINGS:  CHEST:  LUNGS AND LARGE AIRWAYS: Patent central airways. Emphysema. Right middle, right lower, and left upper lobe opacities.  PLEURA: No pleural effusion.  VESSELS: Atherosclerotic calcifications of thoracic aorta and coronary arteries. Multiple images are degraded by respiratory motion. Excluding regions with motion artifact, there are no pulmonary arterial filling defects identified.  HEART: Enlarged. No pericardial effusion.  MEDIASTINUM AND ALEN: Mild adenopathy.  CHEST WALL AND LOWER NECK: 1 x 1.2 cm left thyroid hypodensity.    ABDOMEN AND PELVIS:  LIVER: Multiple subcentimeter hepatic hypodensities, too small to further characterize.  BILE DUCTS: Mild dilatation.  GALLBLADDER: Cholelithiasis. Distended. Trace gas noted in the gallbladder.  SPLEEN: Within normal limits.  PANCREAS: Within normal limits.  ADRENALS: Nodular thickening.  KIDNEYS/URETERS: Nonobstructive intrarenal calculi measuring up to 6 mm. Subcentimeter renal hypodensities, too small to further characterize. No hydronephrosis.    BLADDER: Within normal limits.  REPRODUCTIVE ORGANS: Hysterectomy.    BOWEL: No bowel obstruction. Ileocecal anastomosis. Colonic diverticulosis.  PERITONEUM: No ascites.  VESSELS: Atherosclerotic changes. 2.6 x 2.8 cm abdominal aortic saccular aneurysm. Partially imaged right femoral bypass grafts, which appear occluded.  RETROPERITONEUM/LYMPH NODES: No lymphadenopathy.  ABDOMINAL WALL: Subcutaneous soft tissue edema.  BONES: Degenerative changes. Bilateral hip ORIF.    IMPRESSION:    1. Emphysema.  2. Multifocal pneumonia.  3. Negative for pulmonary embolism.  4. Cholelithiasis and distended gallbladder.  5. Colonic diverticulosis without CT evidence of acute diverticulitis.  6. No bowel obstruction.  7. Abdominal aortic saccular aneurysm (2.6 x 2.8 cm).  8. Right femoral bypass grafts, which appear occluded.    GORDON MARIANO MD; Attending Radiologist  This documenthas been electronically signed. Jan 5 2021  3:17PM    < end of copied text >      Assessment:       Surgical Team Contact Information  Spectralink: Ext: [phone] or [phone]  Pager: [phone] 86yo F with PMHx of afib (on Coumadin), CVA, HTN, HLD, mitral valve prolapse, rheumatoid arthritis, infrarenal AAA s/p aneurysm repair (1970's), s/p RLE angio (11/17, Dr. Mcknight), (8/12, Dr. Araya), BIBEMS from home c/o diffuse abdominal pain, nausea, and vomiting for the past 2 days. Pt's daughter was called to obtain further medical history. Daughter confirmed mother's story, adds she had one episode of diarrhea. Incidental finding on CT of R femoral bypass grafts occlusion. Denies CP, SOB.      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    Osteoarthritis    Afib    PVD (peripheral vascular disease)    Neuropathy  (Right lower leg)    H/O cerebral aneurysm repair  brain clips    CVA (cerebral vascular accident)  (&quot;Mini-stroke&quot;,1990&#x27;s)    Rheumatoid arthritis    Hyperlipidemia    Hypertension    S/P cataract surgery  (Left eye)    Elective surgery  (&quot;Twisted bowel&quot;, 2014)    Elective surgery  (Exision of cyst on liver, 1985)    S/P ORIF (open reduction internal fixation) fracture  Left hip fx (2012) &amp; R hip fx (2013)    H/O cerebral aneurysm repair  Brain clips (1978(    Renal stone  Cysto stent placement 10/1/2014    PVD (peripheral vascular disease)  s/p RLE bypass x 3, most recent 3/2012 Right external iliac to PT bypass w/ PTFE (2012)    S/P FRED (total abdominal hysterectomy)  (1987, Hx of &quot;ovarian cancer?&quot;)      Allergies:  No Known Allergies    Home Medications:  aspirin 81 mg oral delayed release tablet: 1 tab(s) orally once a day  atorvastatin 40 mg oral tablet: 1 tab(s) orally once a day (at bedtime)  Coumadin 2.5 mg oral tablet: 1 tab(s) orally once a day for 5 days a week  Coumadin 5 mg oral tablet: 1 tab(s) orally 2 times a week on Wednesday and Sunday  dilTIAZem 240 mg/24 hours oral capsule, extended release: 1 cap(s) orally once a day  gabapentin 100 mg oral capsule: 1 cap(s) orally 3 times a day  hydrALAZINE 10 mg oral tablet: 1 tab(s) orally 3 times a day  metoprolol succinate 100 mg oral tablet, extended release: 1 tab(s) orally once a day  multivitamin: 1 tab(s) orally once a day  pantoprazole 40 mg oral delayed release tablet: 1 tab(s) orally every 12 hours  Vitamin C: 1 tab(s) orally once a day      Family History:  FAMILY HISTORY:  No pertinent family history in first degree relatives        ROS:  Constitutional: Admits to lethargy. Denies fever, or weight loss.  Skin: Denies rash.  Eyes: Denies recent vision problems or eye pain.  ENT: Denies congestion, ear pain, or sore throat.  Endocrine: Denies thyroid problems.  Cardiovascular: Denies chest pain or palpation.  Respiratory: Denies cough, shortness of breath, congestion, or wheezing.  Gastrointestinal: +abdominal pain, nausea, vomiting, and 1 episode of diarrhea.  Genitourinary: Denies dysuria.  Musculoskeletal: Denies joint swelling.  Neurologic: Denies headache.    PHYSICAL EXAM:  GENERAL: No acute distress, well-developed  HEAD:  Atraumatic, Normocephalic  LOWER EXTREM: +palpable b/l fem and popliteal pulses. DP & PT nonpalpable bilaterally, Capillary refill >2sec b/l.  No pedal hair b/l. Dopplers reveal DP and PT pulses on left. hypertrophic nails b/l. absent 2nd digit R foot.  DERM: dry. No erythema.  MSK:  5/5 strength at all muscle groups crossing ankle joint b/l.  NEURO: Grossly intact    Data:  T(C): 36.7 (01-05-21 @ 16:47), Max: 36.7 (01-05-21 @ 16:47)  HR: 120 (01-05-21 @ 16:47) (120 - 140)  BP: 169/90 (01-05-21 @ 16:47) (150/105 - 182/121)  RR: 18 (01-05-21 @ 16:47) (18 - 19)  SpO2: 94% (01-05-21 @ 16:47) (92% - 95%)                        11.9   13.57 )-----------( 216      ( 05 Jan 2021 12:42 )             37.4     01-05    141  |  108  |  35<H>  ----------------------------<  127<H>  3.9   |  23  |  1.30    Ca    8.8      05 Jan 2021 12:42    TPro  7.6  /  Alb  3.4  /  TBili  2.0<H>  /  DBili  x   /  AST  22  /  ALT  25  /  AlkPhos  126<H>  01-05      LIVER FUNCTIONS - ( 05 Jan 2021 12:42 )  Alb: 3.4 g/dL / Pro: 7.6 g/dL / ALK PHOS: 126 U/L / ALT: 25 U/L / AST: 22 U/L / GGT: x           Radiology:  < from: CT Angio Chest w/ IV Cont (01.05.21 @ 14:23) >  FINDINGS:  CHEST:  LUNGS AND LARGE AIRWAYS: Patent central airways. Emphysema. Right middle, right lower, and left upper lobe opacities.  PLEURA: No pleural effusion.  VESSELS: Atherosclerotic calcifications of thoracic aorta and coronary arteries. Multiple images are degraded by respiratory motion. Excluding regions with motion artifact, there are no pulmonary arterial filling defects identified.  HEART: Enlarged. No pericardial effusion.  MEDIASTINUM AND ALEN: Mild adenopathy.  CHEST WALL AND LOWER NECK: 1 x 1.2 cm left thyroid hypodensity.    ABDOMEN AND PELVIS:  LIVER: Multiple subcentimeter hepatic hypodensities, too small to further characterize.  BILE DUCTS: Mild dilatation.  GALLBLADDER: Cholelithiasis. Distended. Trace gas noted in the gallbladder.  SPLEEN: Within normal limits.  PANCREAS: Within normal limits.  ADRENALS: Nodular thickening.  KIDNEYS/URETERS: Nonobstructive intrarenal calculi measuring up to 6 mm. Subcentimeter renal hypodensities, too small to further characterize. No hydronephrosis.    BLADDER: Within normal limits.  REPRODUCTIVE ORGANS: Hysterectomy.    BOWEL: No bowel obstruction. Ileocecal anastomosis. Colonic diverticulosis.  PERITONEUM: No ascites.  VESSELS: Atherosclerotic changes. 2.6 x 2.8 cm abdominal aortic saccular aneurysm. Partially imaged right femoral bypass grafts, which appear occluded.  RETROPERITONEUM/LYMPH NODES: No lymphadenopathy.  ABDOMINAL WALL: Subcutaneous soft tissue edema.  BONES: Degenerative changes. Bilateral hip ORIF.    IMPRESSION:    1. Emphysema.  2. Multifocal pneumonia.  3. Negative for pulmonary embolism.  4. Cholelithiasis and distended gallbladder.  5. Colonic diverticulosis without CT evidence of acute diverticulitis.  6. No bowel obstruction.  7. Abdominal aortic saccular aneurysm (2.6 x 2.8 cm).  8. Right femoral bypass grafts, which appear occluded.    GORDON MARIANO MD; Attending Radiologist  This documenthas been electronically signed. Jan 5 2021  3:17PM    < end of copied text >      Assessment:     86yo F with extensive PMHx with incidental finding of 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    Plan:  -Discussed with Dr. Vargas  -No acute intervention at this time  -Once patient is more stable, can consider further work-up including US and angiogram, but at this time patient is asymptomatic  -Continue AC for AFib  -Care per primary team    Surgical Team Contact Information  Spectralink: Ext: [phone] or [phone]  Pager: [phone]

## 2021-01-05 NOTE — ED ADULT TRIAGE NOTE - CHIEF COMPLAINT QUOTE
Has not taken PO meds in 2 days.  PResents with Afib with 's.  C/O Upper L under breast pain. N/V. 325 ASA, 5mg Lopressor IV given PTA

## 2021-01-05 NOTE — ED ADULT NURSE REASSESSMENT NOTE - NS ED NURSE REASSESS COMMENT FT1
Patient has been on 4 liters nasal cannula with oxygen at 93%. Patient oxygen on room air 88%. Dr. Llanes made aware. Order for oxygen placed. Patient heart rate 90s to 115 on Cardizem drip at 10 ml/hr. Order changed to set rate. Mirlande GARCIA

## 2021-01-05 NOTE — H&P ADULT - PROBLEM SELECTOR PLAN 1
- Patient with leukocytosis and tachycardia with likely pulmonary source  - CTA with multifocal bilateral opacities  - Admit to telemetry  - Lactate 2.7, s/p ?IVF  - S/p Zosyn and Vancomycin in the ED, continue upon admission  - F/u blood cultures  - F/u COVID PCR  - Trend daily CBC and temperature curve - Patient with leukocytosis and tachycardia with likely pulmonary/intra-abdominal source  - CTA with multifocal bilateral opacities and distended gallbladder with stones and trace gas  - Admit to telemetry  - Lactate 2.7, s/p ?IVF  - S/p Zosyn and Vancomycin in the ED, continue upon admission  - F/u blood cultures  - COVID (-)  - Trend daily CBC and temperature curve - Patient with leukocytosis and tachycardia with likely pulmonary/intra-abdominal source  - CTA with multifocal bilateral opacities and distended gallbladder with stones and trace gas  - Admit to telemetry  - Lactate 2.7, s/p ?IVF  - S/p Zosyn and Vancomycin in the ED, continue upon admission  - F/u blood cultures  - COVID (-), but will repeat COVID test tomorrow. Isolation precuations due to higher suspicion of covid infection  - Trend daily CBC and temperature curve - Patient with leukocytosis and tachycardia with likely pulmonary/intra-abdominal source  - CTA with multifocal bilateral opacities and distended gallbladder with stones and trace gas  - Admit to telemetry  - Lactate 2.7, start on maintenance IV NS at 60cc for 18hrs  - S/p Zosyn and Vancomycin in the ED, continue Zosyn upon admission  - F/u blood cultures  - COVID (-), but will repeat COVID test tomorrow. Isolation precuations due to higher suspicion of covid infection  - Trend daily CBC and temperature curve

## 2021-01-05 NOTE — H&P ADULT - PROBLEM SELECTOR PLAN 3
- Patient noted to be tachycardic during transport to ED by EMS, likely due to missed PO doses and acute infectious process  - Known h/o afib on Coumadin 2.5mg 5 days/week at home  - S/p Lopressor 5mg and ASA 325mg with EMS  - EKG with rapid afib, no evidence of acute ischemia  - Takes diltiazem 240mg qD and ?metoprolol succinate 150mg qD at home, had not taken medications over past 2 days  - Cardio Dr. De Los Santos evaluated patient in the ED, recommending IV Lopressor 2.5mg q6h and IV Cardizem gtt  - Elevated BNP possibly related to rapid afib though no evidence of hypervolemia on exam or on imaging, cardio recommending avoiding aggressive diuresis  - F/u TTE and repeat BNP in 48 hours  - TSH WNL  - Monitor daily INR, home Coumadin held per cardio recs due to h/o GIB and current inability to tolerate PO - Patient noted to be tachycardic during transport to ED by EMS, likely due to missed PO doses and acute infectious process  - Known h/o afib on Coumadin 2.5mg M/T/Th/F/Sat and 5mg on Wed/Sun at home  - S/p Lopressor 5mg and ASA 325mg with EMS  - EKG with rapid afib, no evidence of acute ischemia  - Takes diltiazem 240mg qD and metoprolol succinate 100mg qD at home, had not taken medications over past 2 days  - Cardio Dr. De Los Santos evaluated patient in the ED, recommending IV Lopressor 2.5mg q6h and IV Cardizem gtt  - Elevated BNP possibly related to rapid afib though no evidence of hypervolemia on exam or on imaging, cardio recommending avoiding aggressive diuresis  - F/u TTE and repeat BNP in 48 hours  - TSH WNL  - Monitor daily INR, home Coumadin held per cardio recs due to h/o GIB (5-6 yrs ago) and current "pink" vomitus/inability to tolerate PO

## 2021-01-05 NOTE — H&P ADULT - NSHPREVIEWOFSYSTEMS_GEN_ALL_CORE
CONSTITUTIONAL: denies fever, chills, fatigue, weakness  HEENT: denies blurred vision, sore throat  SKIN: denies new lesions, rash  CARDIOVASCULAR: denies chest pain, chest pressure, palpitations  RESPIRATORY: denies shortness of breath, sputum production  GASTROINTESTINAL: denies nausea, vomiting, diarrhea, abdominal pain  GENITOURINARY: denies dysuria, discharge  NEUROLOGICAL: denies numbness, headache, focal weakness  MUSCULOSKELETAL: denies new joint pain, muscle aches  HEMATOLOGIC: denies gross bleeding, bruising  LYMPHATICS: denies enlarged lymph nodes, extremity swelling  PSYCHIATRIC: denies recent changes in anxiety, depression  ENDOCRINOLOGIC: denies sweating, cold or heat intolerance CONSTITUTIONAL: denies fever, chills, fatigue, weakness  HEENT: denies blurred vision, sore throat  SKIN: denies new lesions, rash  CARDIOVASCULAR: denies chest pain, chest pressure, palpitations  RESPIRATORY: denies shortness of breath, sputum production  GASTROINTESTINAL: admits nausea, vomiting, abdominal pain  GENITOURINARY: denies dysuria, discharge  NEUROLOGICAL: denies numbness, headache, focal weakness  MUSCULOSKELETAL: denies new joint pain, muscle aches  HEMATOLOGIC: denies gross bleeding, bruising CONSTITUTIONAL: denies fever, chills, fatigue, weakness  HEENT: denies blurred vision, sore throat  SKIN: denies new lesions, rash  CARDIOVASCULAR: denies chest pain, chest pressure, palpitations  RESPIRATORY: denies shortness of breath, sputum production  GASTROINTESTINAL: admits nausea, vomiting, abdominal pain, denies melena, hematochezia  GENITOURINARY: denies dysuria, discharge  NEUROLOGICAL: denies numbness, headache, focal weakness  MUSCULOSKELETAL: denies new joint pain, muscle aches  HEMATOLOGIC: denies gross bleeding, bruising

## 2021-01-05 NOTE — H&P ADULT - PROBLEM SELECTOR PLAN 4
- Patient with complaints of abd pain, N/V  - CTA abd with evidence of cholelithiasis, gallbladder distension and trace gas  - S/p Zosyn/Vanc in the ED for pulmonary process, Zosyn with coverage for GI microbes as well  - Significant N/V makes patient intolerant to PO, QTc 519 on EKG, anti-emetic with IM Tigan 200mg now, can dose up to TID-QID PRN  - Surgical PA to evaluate - Patient with complaints of abd pain, N/V  - CTA abd with evidence of cholelithiasis, gallbladder distension and trace gas  - S/p Zosyn/Vanc in the ED for pulmonary process, Zosyn has coverage for GI microbes as well  - Significant N/V makes patient intolerant to PO, QTc 519 on EKG, anti-emetic with IM Tigan 200mg now, can dose up to TID-QID PRN  - Pain control with IV meds for now as patient can't tolerate PO: Toradol 30mg q6h PRN for moderate pain, IV Tylenol 1g for severe  - Surgical PA to evaluate - Patient with complaints of abd pain, N/V  - CTA abd with evidence of cholelithiasis, gallbladder distension and trace gas  - S/p Zosyn/Vanc in the ED for pulmonary process, Zosyn has coverage for GI microbes as well  - Significant N/V makes patient intolerant to PO, QTc 519 on EKG, anti-emetic with IM Tigan 200mg now, can dose up to TID-QID PRN  - Pain control with IV meds for now as patient can't tolerate PO, s/p 1x IV Tylenol, may consider IV Toradol 15mg if no improvement  - Surgical PA to evaluate

## 2021-01-05 NOTE — H&P ADULT - PROBLEM SELECTOR PLAN 6
- H/o peripheral arterial disease s/p 2 stents placed in RLE  - CTA abd/pelvis with evidence of occluded R femoral bypass graft  - Surgical PA to evaluate - H/o peripheral arterial disease s/p 2 stents placed in RLE  - CTA abd/pelvis with evidence of occluded R femoral bypass graft  - Daughter, Dixie, reports patient has known occlusion of femoral graft  - Surgical PA to evaluate - H/o peripheral arterial disease s/p 2 stents placed in RLE  - CTA abd/pelvis with evidence of occluded R femoral bypass graft  - Daughter, Dixie, reports patient has known occlusion of femoral graft  - Surgical PA to evaluate. Consult Dr Vargas - H/o peripheral arterial disease s/p 2 stents placed in RLE  - CTA abd/pelvis with evidence of occluded R femoral bypass graft  - Daughter, Dixie, reports patient has known occlusion of femoral graft  - Surgical PA to evaluate. Consult Dr Vargas.

## 2021-01-06 NOTE — CONSULT NOTE ADULT - SUBJECTIVE AND OBJECTIVE BOX
[facility]  INFECTIOUS DISEASES   [address] [place] [address]  Tel: [phone]     Fax: [phone]  =======================================================    MRN-[id number]  CARITO CONSTANTINO     CC: Abdominal pain, vomiting    HPI:  84yo woman with PMH of HTN, afib (on Coumadin), CVA and h/o aneurysm repair, infrarenal AAA, mitral valve prolapse, PVD of RLE s/p stents x3, rheumatoid arthritis, was admitted on 1/5 with LUQ abdominal pain, nausea, and vomiting for the past 3 days.   No fever or any other symptoms. CT showed Multifocal bilateral opacities in both lungs with emphysema and Cholelithiasis with distended gall bladder with trace gas and nonobstructive intrarenal stones. Occluded R femoral bypass grafts.  today she feels slightly better, no more vomiting but has abdominal discomfort. no diarrhea.     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  Osteoarthritis  Afib  PVD (peripheral vascular disease)  Neuropathy  (Right lower leg)  H/O cerebral aneurysm repair  brain clips  CVA (cerebral vascular accident)  (&quot;Mini-stroke&quot;,1990&#x27;s)  Rheumatoid arthritis  Hyperlipidemia  Hypertension  S/P cataract surgery  (Left eye)  Elective surgery  (&quot;Twisted bowel&quot;, 2014)  Elective surgery  (Exision of cyst on liver, 1985)  S/P ORIF (open reduction internal fixation) fracture  Left hip fx (2012) &amp; R hip fx (2013)  H/O cerebral aneurysm repair  Brain clips (1978(  Renal stone  Cysto stent placement 10/1/2014  PVD (peripheral vascular disease)  s/p RLE bypass x 3, most recent 3/2012 Right external iliac to PT bypass w/ PTFE (2012)  S/P FRED (total abdominal hysterectomy)  (1987, Hx of &quot;ovarian cancer?&quot;)    Social Hx: former smoker, no ETOH or drugs    FAMILY HISTORY:  No pertinent family history in first degree relatives    Allergies  No Known Allergies    Antibiotics:  piperacillin/tazobactam IVPB.. 3.375 Gram(s) IV Intermittent every 8 hours     REVIEW OF SYSTEMS:  CONSTITUTIONAL:  No Fever or chills  HEENT:  No diplopia or blurred vision.  No sore throat or runny nose.  CARDIOVASCULAR:  No chest pain or SOB.  RESPIRATORY:  No cough, shortness of breath, PND or orthopnea.  GASTROINTESTINAL: + nausea, vomiting, nodiarrhea.  GENITOURINARY:  No dysuria, frequency or urgency. No Blood in urine  MUSCULOSKELETAL:  no joint aches, no muscle pain  SKIN:  No change in skin, hair or nails.  NEUROLOGIC:  No paresthesias, fasciculations, seizures or weakness.  PSYCHIATRIC:  No disorder of thought or mood.  ENDOCRINE:  No heat or cold intolerance, polyuria or polydipsia.  HEMATOLOGICAL:  No easy bruising or bleeding.     Physical Exam:  Vital Signs Last 24 Hrs  T(C): 36.9 (06 Jan 2021 05:18), Max: 37.1 (06 Jan 2021 00:32)  T(F): 98.4 (06 Jan 2021 05:18), Max: 98.7 (06 Jan 2021 00:32)  HR: 80 (06 Jan 2021 05:57) (77 - 140)  BP: 138/77 (06 Jan 2021 05:57) (137/82 - 180/108)  BP(mean): --  RR: 17 (06 Jan 2021 05:18) (16 - 18)  SpO2: 92% (06 Jan 2021 05:18) (92% - 94%)  GEN: NAD  HEENT: normocephalic and atraumatic. EOMI. PERRL.    NECK: Supple.  No lymphadenopathy   LUNGS: Clear to auscultation.  HEART: Regular rate and rhythm   ABDOMEN: Soft, and nondistended.  Positive bowel sounds.  Mild upper abdominal tendereness   : No CVA tenderness  EXTREMITIES: Without any cyanosis, clubbing, rash, lesions or edema.  NEUROLOGIC: grossly intact.  PSYCHIATRIC: Appropriate affect .  SKIN: No ulceration or induration present.    Labs:  01-06    142  |  110<H>  |  29<H>  ----------------------------<  107<H>  3.3<L>   |  22  |  1.00    Ca    8.2<L>      06 Jan 2021 08:08  Phos  2.5     01-06  Mg     1.9     01-06    TPro  6.1  /  Alb  2.7<L>  /  TBili  2.2<H>  /  DBili  x   /  AST  20  /  ALT  21  /  AlkPhos  107  01-06                        10.4   12.34 )-----------( 188      ( 06 Jan 2021 08:08 )             33.1     PT/INR - ( 06 Jan 2021 08:08 )   PT: 31.0 sec;   INR: 2.78 ratio    PTT - ( 06 Jan 2021 08:08 )  PTT:37.4 sec    LIVER FUNCTIONS - ( 06 Jan 2021 08:08 )  Alb: 2.7 g/dL / Pro: 6.1 g/dL / ALK PHOS: 107 U/L / ALT: 21 U/L / AST: 20 U/L / GGT: x           CARDIAC MARKERS ( 05 Jan 2021 16:27 )  .030 ng/mL / x     / 106 U/L / x     / 3.1 ng/mL  CARDIAC MARKERS ( 05 Jan 2021 12:42 )  .027 ng/mL / x     / x     / x     / 3.9 ng/mL    COVID-19 PCR: NotDetec (01-06-21 @ 06:01)  COVID-19 PCR: NotDetec (01-05-21 @ 12:42)    All imaging and other data have been reviewed.  < from: CT Angio Chest w/ IV Cont (01.05.21 @ 14:23) >  IMPRESSION:  1. Emphysema.  2. Multifocal pneumonia.  3. Negative for pulmonary embolism.  4. Cholelithiasis and distended gallbladder.  5. Colonic diverticulosis without CT evidence of acute diverticulitis.  6. No bowel obstruction.  7. Abdominal aortic saccular aneurysm (2.6 x 2.8 cm).  8. Right femoral bypass grafts, which appear occluded.    Assessment and Plan:   84yo woman with PMH of HTN, afib (on Coumadin), CVA and h/o aneurysm repair, infrarenal AAA, mitral valve prolapse, PVD of RLE s/p stents x3, rheumatoid arthritis, was admitted on 1/5 with LUQ abdominal pain, nausea, and vomiting for the past 3 days.   No fever or any other symptoms. CT showed Multifocal bilateral opacities in both lungs with emphysema and Cholelithiasis with distended gall bladder with trace gas and nonobstructive intrarenal stones.  Abdominal process such as cholecystitis and cholangitis is a possibility but also with pneumonia patients can have abdominal symptoms. Will need further work up to rule out acute cholecystitis/cholangitis.     Multilobar pneumonia, Rule out acute cholecystitis  - COVID test negative and 2nd one penidng   - Blood culture x 2  - CT result noted, will need HIDA scan  - Seen by surgery   - Agree with Zosyn 3.375gm q8h to cover abdominal daniele until HIDA is done.   - Will add azithromycin 500mg daily for 3 days  - Legionella U ag is ordered    Thank you for courtesy of this consult.     Will follow.    Sindi Anthony MD  Division of Infectious Diseases   Cell [phone] between 8am and 6pm   After 6pm and weekends please call ID service at [phone].

## 2021-01-06 NOTE — PROGRESS NOTE ADULT - PROBLEM SELECTOR PLAN 7
- Known h/o fusiform infrarenal AAA without significant change last evaluated during 1/2020 admission by vascular surgery with no acute intervention at that time, recommended outpatient follow up Known h/o fusiform infrarenal AAA without significant change last evaluated during 1/2020 admission by vascular surgery with no acute intervention at that time, recommended outpatient follow up

## 2021-01-06 NOTE — PROGRESS NOTE ADULT - PROBLEM/PLAN-7
I concur with the Admission Order and I certify that services are provided in accordance with Section 42 CFR § 412.3 DISPLAY PLAN FREE TEXT

## 2021-01-06 NOTE — PROGRESS NOTE ADULT - SUBJECTIVE AND OBJECTIVE BOX
loratadine 5 ml daily for allergy symptoms  Fluids, hold dairy products   Tylenol or ibuprofen                                                                         Upper Respiratory Infection Treatment    Infants under 1 year    Saline nose drops and suction frequently  Humidifier  Elevate the head of the bed  Fluids- pedialyte, chamomile tea    Infants and Children over 1 year of age    Saline nasal drops or spray frequently  Humidifier  Buckwheat honey for cough-1 tsp. 4-6 times a day in herbal tea or straight off the spoon  Fluids- water, herbal tea,dairy substitutes(soy milk, soy yogurt, etc.)       Patient is a 85y old  Female who presents with a chief complaint of PNA (06 Jan 2021 11:56)      FROM ADMISSION H+P:   HPI:  86yo F, with PMH/o afib (on Coumadin), CVA and h/o aneurysm repair, infrarenal AAA, HTN, HLD, mitral valve prolapse, PVD of RLE s/p stents x3, rheumatoid arthritis, s/p hysterectomy, and L cataract, BIBEMS from home c/o LUQ abdominal pain, nausea, and vomiting for the past 3 days. Patient describes vomit as pink with black and green colors. Patient's daughter does report h/o GI bleed last occurring 5-6 years ago. While being transported, EMS found patient to be hypertensive and tachycardic, noting patient had not taken PO meds in the past 2 days due to abdominal symptoms. EMS administered Lopressor 5mg and ASA 325mg.    In the ED  VS: T 97.9  /121-->150/105-->180/108 RR 19 SpO2 95% on RA  Significant labs include WBC 13.57, lactate 2.7, BUN 37, TBili 2.0, alk phos 126, BNP 92561.  CXR: Suspect patchy L perihilar infiltrate  CTA chest/abd/pelvis: Emphysema, multifocal bilateral opacities. No arterial filling defects but limited by motion artifact.Cholelithiasis, distended gallbladder with trace gas. Nonobstructive intrarenal stones. Occluded R femoral bypass grafts.  CT head: advanced periventricular and deep white matter ischemia, progressed from prior examination, multiple old lacunar infarctions in bilateral basal ganglia, aneurysm clip again noted, L temporal craniotomy/craniectomy  EKG: afib with RVR, LAD, nonspecific ST/Tw abnormality    Patient received IV Labetalol 20mg x1, Zosyn, Vancomycin, and started on diltiazem gtt. (05 Jan 2021 15:33)      ----  INTERVAL HPI/OVERNIGHT EVENTS: Pt seen and evaluated at the bedside. No acute overnight events occurred.     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    Osteoarthritis    Afib    PVD (peripheral vascular disease)    Neuropathy  (Right lower leg)    H/O cerebral aneurysm repair  brain clips    CVA (cerebral vascular accident)  (&quot;Mini-stroke&quot;,1990&#x27;s)    Rheumatoid arthritis    Hyperlipidemia    Hypertension    S/P cataract surgery  (Left eye)    Elective surgery  (&quot;Twisted bowel&quot;, 2014)    Elective surgery  (Exision of cyst on liver, 1985)    S/P ORIF (open reduction internal fixation) fracture  Left hip fx (2012) &amp; R hip fx (2013)    H/O cerebral aneurysm repair  Brain clips (1978(    Renal stone  Cysto stent placement 10/1/2014    PVD (peripheral vascular disease)  s/p RLE bypass x 3, most recent 3/2012 Right external iliac to PT bypass w/ PTFE (2012)    S/P FRED (total abdominal hysterectomy)  (1987, Hx of &quot;ovarian cancer?&quot;)        FAMILY HISTORY:  No pertinent family history in first degree relatives        Home Medications:  aspirin 81 mg oral delayed release tablet: 1 tab(s) orally once a day (05 Jan 2021 14:12)  atorvastatin 40 mg oral tablet: 1 tab(s) orally once a day (at bedtime) (05 Jan 2021 14:12)  Coumadin 2.5 mg oral tablet: 1 tab(s) orally once a day for 5 days a week (05 Jan 2021 14:12)  Coumadin 5 mg oral tablet: 1 tab(s) orally 2 times a week on Wednesday and Sunday (05 Jan 2021 15:36)  dilTIAZem 240 mg/24 hours oral capsule, extended release: 1 cap(s) orally once a day (05 Jan 2021 14:12)  gabapentin 100 mg oral capsule: 1 cap(s) orally 3 times a day (05 Jan 2021 15:36)  hydrALAZINE 10 mg oral tablet: 1 tab(s) orally 3 times a day (05 Jan 2021 15:36)  metoprolol succinate 100 mg oral tablet, extended release: 1 tab(s) orally once a day (05 Jan 2021 15:36)  multivitamin: 1 tab(s) orally once a day (05 Jan 2021 15:36)  pantoprazole 40 mg oral delayed release tablet: 1 tab(s) orally every 12 hours (05 Jan 2021 14:12)  Vitamin C: 1 tab(s) orally once a day (05 Jan 2021 15:36)      Allergies    No Known Allergies    Intolerances        ----  MEDICATIONS  (STANDING):  aspirin enteric coated 81 milliGRAM(s) Oral daily  atorvastatin 40 milliGRAM(s) Oral at bedtime  azithromycin  IVPB 500 milliGRAM(s) IV Intermittent once  azithromycin  IVPB      diltiazem    Tablet 60 milliGRAM(s) Oral every 6 hours  diltiazem Infusion 10 mG/Hr (10 mL/Hr) IV Continuous <Continuous>  gabapentin 100 milliGRAM(s) Oral three times a day  metoprolol tartrate 12.5 milliGRAM(s) Oral every 6 hours  multivitamin 1 Tablet(s) Oral daily  pantoprazole    Tablet 40 milliGRAM(s) Oral two times a day  piperacillin/tazobactam IVPB.. 3.375 Gram(s) IV Intermittent every 8 hours  potassium chloride    Tablet ER 40 milliEquivalent(s) Oral every 4 hours  sodium chloride 0.9%. 1000 milliLiter(s) (60 mL/Hr) IV Continuous <Continuous>    MEDICATIONS  (PRN):  acetaminophen   Tablet .. 650 milliGRAM(s) Oral every 6 hours PRN Mild Pain (1 - 3)      ----  REVIEW OF SYSTEMS:  CONSTITUTIONAL: denies fever, chills, fatigue, weakness  HEENT: denies blurred vision, sore throat  SKIN: denies new lesions, rash  CARDIOVASCULAR: denies chest pain, chest pressure, palpitations  RESPIRATORY: denies shortness of breath, sputum production  GASTROINTESTINAL: denies nausea, vomiting, diarrhea, abdominal pain  GENITOURINARY: denies dysuria, discharge  NEUROLOGICAL: denies numbness, headache, focal weakness  MUSCULOSKELETAL: denies new joint pain, muscle aches  HEMATOLOGIC: denies gross bleeding, bruising  LYMPHATICS: denies enlarged lymph nodes, extremity swelling  PSYCHIATRIC: denies recent changes in anxiety, depression  ENDOCRINOLOGIC: denies sweating, cold or heat intolerance    ----  PHYSICAL EXAM:  GENERAL: patient appears well, no acute distress, appropriately interactive  EYES: sclera clear, no exudates  ENMT: oropharynx clear without erythema, moist mucous membranes  NECK: supple, soft, no thyromegaly noted  LUNGS: good air entry bilaterally, clear to auscultation, symmetric breath sounds, no wheezing or rhonchi appreciated  HEART: soft S1/S2, regular rate and rhythm, no murmurs noted, no noted edema to b/l LE  GASTROINTESTINAL: abdomen is soft, nontender, nondistended, normoactive bowel sounds, no palpable masses  INTEGUMENT: good skin turgor, appropriate for ethnicity, no visualized rashes, no jaundice noted  MUSCULOSKELETAL: no clubbing or cyanosis, no obvious deformity  NEUROLOGIC: awake, alert, oriented x3, good muscle tone in 4 extremities, no obvious sensory deficits  PSYCHIATRIC: mood is good, affect is congruent with mood, linear and logical thought process  HEME/LYMPH: no palpable supraclavicular nodules, no obvious ecchymosis     T(C): 36.9 (01-06-21 @ 05:18), Max: 37.1 (01-06-21 @ 00:32)  HR: 80 (01-06-21 @ 05:57) (77 - 140)  BP: 138/77 (01-06-21 @ 05:57) (137/82 - 180/108)  RR: 17 (01-06-21 @ 05:18) (16 - 18)  SpO2: 92% (01-06-21 @ 05:18) (92% - 94%)  Wt(kg): --    ----  I&O's Summary    05 Jan 2021 07:01  -  06 Jan 2021 07:00  --------------------------------------------------------  IN: 560 mL / OUT: 200 mL / NET: 360 mL        LABS:                        10.4   12.34 )-----------( 188      ( 06 Jan 2021 08:08 )             33.1     01-06    142  |  110<H>  |  29<H>  ----------------------------<  107<H>  3.3<L>   |  22  |  1.00    Ca    8.2<L>      06 Jan 2021 08:08  Phos  2.5     01-06  Mg     1.9     01-06    TPro  6.1  /  Alb  2.7<L>  /  TBili  2.2<H>  /  DBili  x   /  AST  20  /  ALT  21  /  AlkPhos  107  01-06    PT/INR - ( 06 Jan 2021 08:08 )   PT: 31.0 sec;   INR: 2.78 ratio         PTT - ( 06 Jan 2021 08:08 )  PTT:37.4 sec    CAPILLARY BLOOD GLUCOSE                    ----  Personally reviewed:  Vital sign trends: [  ] yes    [  ] no     [  ] n/a  Laboratory results: [  ] yes    [  ] no     [  ] n/a  Radiology results: [  ] yes    [  ] no     [  ] n/a  Culture results: [  ] yes    [  ] no     [  ] n/a  Consultant recommendations: [  ] yes    [  ] no     [  ] n/a         Patient is a 85y old  Female who presents with a chief complaint of PNA (06 Jan 2021 11:56)      FROM ADMISSION H+P:   HPI:  84yo F, with PMH/o afib (on Coumadin), CVA and h/o aneurysm repair, infrarenal AAA, HTN, HLD, mitral valve prolapse, PVD of RLE s/p stents x3, rheumatoid arthritis, s/p hysterectomy, and L cataract, BIBEMS from home c/o LUQ abdominal pain, nausea, and vomiting for the past 3 days. Patient describes vomit as pink with black and green colors. Patient's daughter does report h/o GI bleed last occurring 5-6 years ago. While being transported, EMS found patient to be hypertensive and tachycardic, noting patient had not taken PO meds in the past 2 days due to abdominal symptoms. EMS administered Lopressor 5mg and ASA 325mg.    In the ED  VS: T 97.9  /121-->150/105-->180/108 RR 19 SpO2 95% on RA  Significant labs include WBC 13.57, lactate 2.7, BUN 37, TBili 2.0, alk phos 126, BNP 16950.  CXR: Suspect patchy L perihilar infiltrate  CTA chest/abd/pelvis: Emphysema, multifocal bilateral opacities. No arterial filling defects but limited by motion artifact.Cholelithiasis, distended gallbladder with trace gas. Nonobstructive intrarenal stones. Occluded R femoral bypass grafts.  CT head: advanced periventricular and deep white matter ischemia, progressed from prior examination, multiple old lacunar infarctions in bilateral basal ganglia, aneurysm clip again noted, L temporal craniotomy/craniectomy  EKG: afib with RVR, LAD, nonspecific ST/Tw abnormality    Patient received IV Labetalol 20mg x1, Zosyn, Vancomycin, and started on diltiazem gtt. (05 Jan 2021 15:33)      ----  INTERVAL HPI/OVERNIGHT EVENTS: Pt seen and evaluated at the bedside. No acute overnight events occurred. Pt continues to report abdominal pain and nausea improved a little from yesterday. She states lying down keeps the pain and nausea from getting worse. Pt has not had bm during admission. 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    Osteoarthritis    Afib    PVD (peripheral vascular disease)    Neuropathy  (Right lower leg)    H/O cerebral aneurysm repair  brain clips    CVA (cerebral vascular accident)  (&quot;Mini-stroke&quot;,1990&#x27;s)    Rheumatoid arthritis    Hyperlipidemia    Hypertension    S/P cataract surgery  (Left eye)    Elective surgery  (&quot;Twisted bowel&quot;, 2014)    Elective surgery  (Exision of cyst on liver, 1985)    S/P ORIF (open reduction internal fixation) fracture  Left hip fx (2012) &amp; R hip fx (2013)    H/O cerebral aneurysm repair  Brain clips (1978(    Renal stone  Cysto stent placement 10/1/2014    PVD (peripheral vascular disease)  s/p RLE bypass x 3, most recent 3/2012 Right external iliac to PT bypass w/ PTFE (2012)    S/P FRED (total abdominal hysterectomy)  (1987, Hx of &quot;ovarian cancer?&quot;)        FAMILY HISTORY:  No pertinent family history in first degree relatives        Home Medications:  aspirin 81 mg oral delayed release tablet: 1 tab(s) orally once a day (05 Jan 2021 14:12)  atorvastatin 40 mg oral tablet: 1 tab(s) orally once a day (at bedtime) (05 Jan 2021 14:12)  Coumadin 2.5 mg oral tablet: 1 tab(s) orally once a day for 5 days a week (05 Jan 2021 14:12)  Coumadin 5 mg oral tablet: 1 tab(s) orally 2 times a week on Wednesday and Sunday (05 Jan 2021 15:36)  dilTIAZem 240 mg/24 hours oral capsule, extended release: 1 cap(s) orally once a day (05 Jan 2021 14:12)  gabapentin 100 mg oral capsule: 1 cap(s) orally 3 times a day (05 Jan 2021 15:36)  hydrALAZINE 10 mg oral tablet: 1 tab(s) orally 3 times a day (05 Jan 2021 15:36)  metoprolol succinate 100 mg oral tablet, extended release: 1 tab(s) orally once a day (05 Jan 2021 15:36)  multivitamin: 1 tab(s) orally once a day (05 Jan 2021 15:36)  pantoprazole 40 mg oral delayed release tablet: 1 tab(s) orally every 12 hours (05 Jan 2021 14:12)  Vitamin C: 1 tab(s) orally once a day (05 Jan 2021 15:36)      Allergies    No Known Allergies    Intolerances        ----  MEDICATIONS  (STANDING):  aspirin enteric coated 81 milliGRAM(s) Oral daily  atorvastatin 40 milliGRAM(s) Oral at bedtime  azithromycin  IVPB 500 milliGRAM(s) IV Intermittent once  azithromycin  IVPB      diltiazem    Tablet 60 milliGRAM(s) Oral every 6 hours  diltiazem Infusion 10 mG/Hr (10 mL/Hr) IV Continuous <Continuous>  gabapentin 100 milliGRAM(s) Oral three times a day  metoprolol tartrate 12.5 milliGRAM(s) Oral every 6 hours  multivitamin 1 Tablet(s) Oral daily  pantoprazole    Tablet 40 milliGRAM(s) Oral two times a day  piperacillin/tazobactam IVPB.. 3.375 Gram(s) IV Intermittent every 8 hours  potassium chloride    Tablet ER 40 milliEquivalent(s) Oral every 4 hours  sodium chloride 0.9%. 1000 milliLiter(s) (60 mL/Hr) IV Continuous <Continuous>    MEDICATIONS  (PRN):  acetaminophen   Tablet .. 650 milliGRAM(s) Oral every 6 hours PRN Mild Pain (1 - 3)      ----  REVIEW OF SYSTEMS:  CONSTITUTIONAL: denies fever, chills, fatigue, weakness  CARDIOVASCULAR: denies chest pain, chest pressure, palpitations  RESPIRATORY: denies shortness of breath, cough, wheezing   GASTROINTESTINAL: Admits to nausea, abdominal pain. Denies vomiting, diarrhea  GENITOURINARY: denies dysuria, discharge, hematuria    ----  PHYSICAL EXAM:  GENERAL: patient appears well, no acute distress, appropriately interactive  EYES: sclera clear, no exudates  ENMT: oropharynx clear without erythema, moist mucous membranes  LUNGS: good air entry bilaterally, clear to auscultation, symmetric breath sounds, no wheezing or rhonchi appreciated  HEART: soft S1/S2, irregular rate and rhythm, no murmurs noted, no noted edema to b/l LE  GASTROINTESTINAL: abdomen is diffusely tender. Soft, nondistended, normoactive bowel sounds, no palpable masses  MUSCULOSKELETAL: no clubbing or cyanosis, no obvious deformity  NEUROLOGIC: answers questions appropriately     T(C): 36.9 (01-06-21 @ 05:18), Max: 37.1 (01-06-21 @ 00:32)  HR: 80 (01-06-21 @ 05:57) (77 - 140)  BP: 138/77 (01-06-21 @ 05:57) (137/82 - 180/108)  RR: 17 (01-06-21 @ 05:18) (16 - 18)  SpO2: 92% (01-06-21 @ 05:18) (92% - 94%)  Wt(kg): --    ----  I&O's Summary    05 Jan 2021 07:01  -  06 Jan 2021 07:00  --------------------------------------------------------  IN: 560 mL / OUT: 200 mL / NET: 360 mL        LABS:                        10.4   12.34 )-----------( 188      ( 06 Jan 2021 08:08 )             33.1     01-06    142  |  110<H>  |  29<H>  ----------------------------<  107<H>  3.3<L>   |  22  |  1.00    Ca    8.2<L>      06 Jan 2021 08:08  Phos  2.5     01-06  Mg     1.9     01-06    TPro  6.1  /  Alb  2.7<L>  /  TBili  2.2<H>  /  DBili  x   /  AST  20  /  ALT  21  /  AlkPhos  107  01-06    PT/INR - ( 06 Jan 2021 08:08 )   PT: 31.0 sec;   INR: 2.78 ratio         PTT - ( 06 Jan 2021 08:08 )  PTT:37.4 sec    CAPILLARY BLOOD GLUCOSE                    ----  Personally reviewed:  Vital sign trends: [  ] yes    [  ] no     [  ] n/a  Laboratory results: [  ] yes    [  ] no     [  ] n/a  Radiology results: [  ] yes    [  ] no     [  ] n/a  Culture results: [  ] yes    [  ] no     [  ] n/a  Consultant recommendations: [  ] yes    [  ] no     [  ] n/a         Patient is a 85y old  Female who presents with a chief complaint of PNA (06 Jan 2021 11:56)      FROM ADMISSION H+P:   HPI:  86yo F, with PMH/o afib (on Coumadin), CVA and h/o aneurysm repair, infrarenal AAA, HTN, HLD, mitral valve prolapse, PVD of RLE s/p stents x3, rheumatoid arthritis, s/p hysterectomy, and L cataract, BIBEMS from home c/o LUQ abdominal pain, nausea, and vomiting for the past 3 days. Patient describes vomit as pink with black and green colors. Patient's daughter does report h/o GI bleed last occurring 5-6 years ago. While being transported, EMS found patient to be hypertensive and tachycardic, noting patient had not taken PO meds in the past 2 days due to abdominal symptoms. EMS administered Lopressor 5mg and ASA 325mg.    ----  INTERVAL HPI/OVERNIGHT EVENTS:   Pt seen and evaluated at the bedside. No acute overnight events occurred. Pt continues to report abdominal pain and nausea improved a little from yesterday. She states lying down keeps the pain and nausea from getting worse. Pt has not had bm during admission. 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    Osteoarthritis    Afib    PVD (peripheral vascular disease)    Neuropathy  (Right lower leg)    H/O cerebral aneurysm repair  brain clips    CVA (cerebral vascular accident)  (&quot;Mini-stroke&quot;,1990&#x27;s)    Rheumatoid arthritis    Hyperlipidemia    Hypertension    S/P cataract surgery  (Left eye)    Elective surgery  (&quot;Twisted bowel&quot;, 2014)    Elective surgery  (Exision of cyst on liver, 1985)    S/P ORIF (open reduction internal fixation) fracture  Left hip fx (2012) &amp; R hip fx (2013)    H/O cerebral aneurysm repair  Brain clips (1978(    Renal stone  Cysto stent placement 10/1/2014    PVD (peripheral vascular disease)  s/p RLE bypass x 3, most recent 3/2012 Right external iliac to PT bypass w/ PTFE (2012)    S/P FRED (total abdominal hysterectomy)  (1987, Hx of &quot;ovarian cancer?&quot;)        FAMILY HISTORY:  No pertinent family history in first degree relatives      Allergies    No Known Allergies    Intolerances        ----  MEDICATIONS  (STANDING):  aspirin enteric coated 81 milliGRAM(s) Oral daily  atorvastatin 40 milliGRAM(s) Oral at bedtime  azithromycin  IVPB 500 milliGRAM(s) IV Intermittent once  azithromycin  IVPB      diltiazem    Tablet 60 milliGRAM(s) Oral every 6 hours  diltiazem Infusion 10 mG/Hr (10 mL/Hr) IV Continuous <Continuous>  gabapentin 100 milliGRAM(s) Oral three times a day  metoprolol tartrate 12.5 milliGRAM(s) Oral every 6 hours  multivitamin 1 Tablet(s) Oral daily  pantoprazole    Tablet 40 milliGRAM(s) Oral two times a day  piperacillin/tazobactam IVPB.. 3.375 Gram(s) IV Intermittent every 8 hours  potassium chloride    Tablet ER 40 milliEquivalent(s) Oral every 4 hours  sodium chloride 0.9%. 1000 milliLiter(s) (60 mL/Hr) IV Continuous <Continuous>    MEDICATIONS  (PRN):  acetaminophen   Tablet .. 650 milliGRAM(s) Oral every 6 hours PRN Mild Pain (1 - 3)      ----  REVIEW OF SYSTEMS:  CONSTITUTIONAL: denies fever, chills, fatigue, weakness  CARDIOVASCULAR: denies chest pain, chest pressure, palpitations  RESPIRATORY: denies shortness of breath, cough, wheezing   GASTROINTESTINAL: Admits to nausea, abdominal pain. Denies vomiting, diarrhea  GENITOURINARY: denies dysuria, discharge, hematuria    ----  PHYSICAL EXAM:  GENERAL: patient appears well, no acute distress, appropriately interactive  EYES: sclera clear, no exudates  ENMT: oropharynx clear without erythema, moist mucous membranes  LUNGS: good air entry bilaterally, clear to auscultation, symmetric breath sounds, no wheezing or rhonchi appreciated  HEART: soft S1/S2, irregular rate and rhythm, no murmurs noted, no noted edema to b/l LE  GASTROINTESTINAL: abdomen is diffusely tender. Soft, nondistended, normoactive bowel sounds, no palpable masses  MUSCULOSKELETAL: no clubbing or cyanosis, no obvious deformity  NEUROLOGIC: answers questions appropriately     T(C): 36.9 (01-06-21 @ 05:18), Max: 37.1 (01-06-21 @ 00:32)  HR: 80 (01-06-21 @ 05:57) (77 - 140)  BP: 138/77 (01-06-21 @ 05:57) (137/82 - 180/108)  RR: 17 (01-06-21 @ 05:18) (16 - 18)  SpO2: 92% (01-06-21 @ 05:18) (92% - 94%)  Wt(kg): --    ----  I&O's Summary    05 Jan 2021 07:01  -  06 Jan 2021 07:00  --------------------------------------------------------  IN: 560 mL / OUT: 200 mL / NET: 360 mL        LABS:                        10.4   12.34 )-----------( 188      ( 06 Jan 2021 08:08 )             33.1     01-06    142  |  110<H>  |  29<H>  ----------------------------<  107<H>  3.3<L>   |  22  |  1.00    Ca    8.2<L>      06 Jan 2021 08:08  Phos  2.5     01-06  Mg     1.9     01-06    TPro  6.1  /  Alb  2.7<L>  /  TBili  2.2<H>  /  DBili  x   /  AST  20  /  ALT  21  /  AlkPhos  107  01-06    PT/INR - ( 06 Jan 2021 08:08 )   PT: 31.0 sec;   INR: 2.78 ratio         PTT - ( 06 Jan 2021 08:08 )  PTT:37.4 sec    CAPILLARY BLOOD GLUCOSE

## 2021-01-06 NOTE — PROGRESS NOTE ADULT - PROBLEM SELECTOR PLAN 1
- Patient with leukocytosis and tachycardia with likely pulmonary/intra-abdominal source  - CTA with multifocal bilateral opacities and distended gallbladder with stones and trace gas  - Admit to telemetry  - Lactate 2.7, start on maintenance IV NS at 60cc for 18hrs  - S/p Zosyn and Vancomycin in the ED, continue Zosyn upon admission  - F/u blood cultures  - COVID (-), but will repeat COVID test tomorrow. Isolation precuations due to higher suspicion of covid infection  - Trend daily CBC and temperature curve Patient with leukocytosis and tachycardia with likely pulmonary/intra-abdominal source  - CTA with multifocal bilateral opacities and distended gallbladder with stones and trace gas  - Leukocytosis downtrending, patient afebrile  - Lactate downtrending s/p IVF  - Continue Zosyn and added Azithro per ID recs   - Cause of sepsis dx multifocal pna vs cholecystitis   - F/u blood cultures

## 2021-01-06 NOTE — DISCHARGE NOTE PROVIDER - NSDCCPCAREPLAN_GEN_ALL_CORE_FT
PRINCIPAL DISCHARGE DIAGNOSIS  Diagnosis: Pneumonia of both lungs due to infectious organism, unspecified part of lung  Assessment and Plan of Treatment: Imaging was performed of your chest that showed pneumonia in multiple areas of your lungs. You were given supplemental oxygen and antibiotics.      SECONDARY DISCHARGE DIAGNOSES  Diagnosis: Hypertensive urgency  Assessment and Plan of Treatment: You had very high blood pressure when you arrived. We gave you medications to control your blood pressure.  - Continue your home blood pressure medications as prescribed    Diagnosis: Atrial fibrillation with RVR  Assessment and Plan of Treatment: Your heart rate was very high when you arrived. We gave you medications to slow down your heart rate to a safe rate.  - Continue your home atrial fibrillation medications as prescribed    Diagnosis: Non-intractable vomiting with nausea  Assessment and Plan of Treatment: Initially, we suspected a gallbladder infection to be the cause of your nausea, vomiting, and abdominal pain. Imaging ruled out the possibility of cholecystitis. Your symptoms improved after bowel rest without food.    Diagnosis: Abdominal aortic aneurysm (AAA) without rupture  Assessment and Plan of Treatment: - We recommend follow up with primary care physician within 2 weeks after discharge to get an ultrasound of your abdominal aorta    Diagnosis: Peripheral vascular disease  Assessment and Plan of Treatment: - We recommend follow up with primary care physician within 2 weeks after discharge to get an angiogram of your right femoral bypass grafts that appear to be occluded     PRINCIPAL DISCHARGE DIAGNOSIS  Diagnosis: Pneumonia of both lungs due to infectious organism, unspecified part of lung  Assessment and Plan of Treatment: Imaging was performed of your chest that showed pneumonia in multiple areas of your lungs. You were given supplemental oxygen and antibiotics.  - START Augmentin as prescribed   - Follow up with your primary care physician within 1 week      SECONDARY DISCHARGE DIAGNOSES  Diagnosis: Hypertensive urgency  Assessment and Plan of Treatment: You had very high blood pressure when you arrived. We gave you medications to control your blood pressure.  - Continue your home blood pressure medications as prescribed    Diagnosis: Atrial fibrillation with RVR  Assessment and Plan of Treatment: Your heart rate was very high when you arrived. We gave you medications to slow down your heart rate to a safe rate.  - Continue your home atrial fibrillation medications as prescribed    Diagnosis: Non-intractable vomiting with nausea  Assessment and Plan of Treatment: Initially, we suspected a gallbladder infection to be the cause of your nausea, vomiting, and abdominal pain. Imaging ruled out the possibility of cholecystitis. Your symptoms improved after bowel rest without food.    Diagnosis: Abdominal aortic aneurysm (AAA) without rupture  Assessment and Plan of Treatment: - We recommend follow up with primary care physician within 2 weeks after discharge to get an ultrasound of your abdominal aorta    Diagnosis: Peripheral vascular disease  Assessment and Plan of Treatment: - We recommend follow up with primary care physician within 2 weeks after discharge to get an angiogram of your right femoral bypass grafts that appear to be occluded     PRINCIPAL DISCHARGE DIAGNOSIS  Diagnosis: Pneumonia of both lungs due to infectious organism, unspecified part of lung  Assessment and Plan of Treatment: Imaging was performed of your chest that showed pneumonia in multiple areas of your lungs. You were given supplemental oxygen and antibiotics.  - START Augmentin as prescribed   - Follow up with your primary care physician within 1 week      SECONDARY DISCHARGE DIAGNOSES  Diagnosis: Abdominal aortic aneurysm (AAA) without rupture  Assessment and Plan of Treatment: - We recommend follow up with primary care physician within 2 weeks after discharge to get an ultrasound of your abdominal aorta    Diagnosis: Peripheral vascular disease  Assessment and Plan of Treatment: - We recommend follow up with primary care physician within 2 weeks after discharge to get an angiogram of your right femoral bypass grafts that appear to be occluded    Diagnosis: Hypertensive urgency  Assessment and Plan of Treatment: You had very high blood pressure when you arrived. We gave you medications to control your blood pressure.  - Continue your home blood pressure medications as prescribed    Diagnosis: Atrial fibrillation with RVR  Assessment and Plan of Treatment: Your heart rate was very high when you arrived. We gave you medications to slow down your heart rate to a safe rate.  - Continue your home atrial fibrillation medications as prescribed  - Your INR was mildly subtherapeutic as inpatient because your warfarin was held, recommend continuing your typical dosing regimen for warfarin while in rehab and monitoring INR twice weekly.    Diagnosis: Non-intractable vomiting with nausea  Assessment and Plan of Treatment: Initially, we suspected a gallbladder infection to be the cause of your nausea, vomiting, and abdominal pain. Imaging ruled out the possibility of cholecystitis. Your symptoms improved after bowel rest without food.

## 2021-01-06 NOTE — PROGRESS NOTE ADULT - PROBLEM SELECTOR PLAN 3
- Patient noted to be tachycardic during transport to ED by EMS, likely due to missed PO doses and acute infectious process  - Known h/o afib on Coumadin 2.5mg M/T/Th/F/Sat and 5mg on Wed/Sun at home  - S/p Lopressor 5mg and ASA 325mg with EMS  - EKG with rapid afib, no evidence of acute ischemia  - Takes diltiazem 240mg qD and metoprolol succinate 100mg qD at home, had not taken medications over past 2 days  - Cardio Dr. De Los Santos evaluated patient in the ED, recommending IV Lopressor 2.5mg q6h and IV Cardizem gtt  - Elevated BNP possibly related to rapid afib though no evidence of hypervolemia on exam or on imaging, cardio recommending avoiding aggressive diuresis  - F/u TTE and repeat BNP in 48 hours  - TSH WNL  - Monitor daily INR, home Coumadin held per cardio recs due to h/o GIB (5-6 yrs ago) and current "pink" vomitus/inability to tolerate PO Noted to be tachycardic during transport to ED by EMS, likely due to missed PO doses and acute infectious process - Known h/o afib on Coumadin 2.5mg M/T/Th/F/Sat and 5mg on Wed/Sun at home  - On admission, EKG with rapid afib, no evidence of acute ischemia  - Non-compliant with home diltiazem 240mg qD and metoprolol succinate 100mg qD at home  - Elevated BNP possibly related to rapid afib though no evidence of hypervolemia on exam or on imaging, cardio recommending avoiding aggressive diuresis  - INR therapeutic, will hold Coumadin and continue to trend INR. Low suspicion of active bleed  - F/u TTE and repeat BNP in 48 hours Noted to be tachycardic during transport to ED by EMS, likely due to missed PO doses and acute infectious process - Known h/o afib on Coumadin 2.5mg M/T/Th/F/Sat and 5mg on Wed/Sun at home  - On admission, EKG with rapid afib, no evidence of acute ischemia  - Non-compliant with home diltiazem 240mg qD and metoprolol succinate 100mg qD at home  - Elevated BNP possibly related to rapid afib though no evidence of hypervolemia on exam or on imaging, cardio recommending avoiding aggressive diuresis  - Hold Coumadin for therapeutic INR and possible cholecystectomy  - F/u TTE and repeat BNP in 48 hours Noted to be tachycardic during transport to ED by EMS, likely due to missed PO doses and acute infectious process - Known h/o afib on Coumadin 2.5mg M/T/Th/F/Sat and 5mg on Wed/Sun at home  - On admission, EKG with rapid afib, no evidence of acute ischemia  - Non-compliant with home diltiazem 240mg qD and metoprolol succinate 100mg qD at home  - Elevated BNP possibly related to rapid afib though no evidence of hypervolemia on exam or on imaging, cardio recommending avoiding aggressive diuresis  - Hold Coumadin for therapeutic INR and possible cholecystectomy  - F/u TTE and repeat BNP in 48 hours  - continue hold warfarin for now

## 2021-01-06 NOTE — PROGRESS NOTE ADULT - PROBLEM SELECTOR PLAN 6
- H/o peripheral arterial disease s/p 2 stents placed in RLE  - CTA abd/pelvis with evidence of occluded R femoral bypass graft  - Daughter, Dixie, reports patient has known occlusion of femoral graft  - Surgical PA to evaluate. Consult Dr Vargas. H/o peripheral arterial disease s/p 2 stents placed in RLE  - CTA abd/pelvis with evidence of occluded R femoral bypass graft  - Daughter, Dixie, reports patient has known occlusion of femoral graft  - Consult Dr Vargas recommends no intervention

## 2021-01-06 NOTE — PROGRESS NOTE ADULT - SUBJECTIVE AND OBJECTIVE BOX
Hospital day 1    85y Female admitted with Pneumonia due to organism  Secondary gallbladder with cholelithiasis, Vomiting.    .    Patient seen and examined bedside resting comfortably.  Currently with nasal cannula in place.  Currently NPO.  States he breathing is ok when resting.  Currently without abdominal discomfort until she presses on her abdomen.  No N/V/D.  No fevers.      T(F): 98.4 (01-06-21 @ 05:18), Max: 98.7 (01-06-21 @ 00:32)  HR: 80 (01-06-21 @ 05:57) (77 - 140)  BP: 138/77 (01-06-21 @ 05:57) (137/82 - 182/121)  RR: 17 (01-06-21 @ 05:18) (16 - 19)  SpO2: 92% (01-06-21 @ 05:18) (92% - 95%)  Wt(kg): --  CAPILLARY BLOOD GLUCOSE          PHYSICAL EXAM:  General: NAD  Neuro:  Alert & oriented x 3  CV: +S1+S2 regular rate and rhythm  Lung: Mild rhonchi in upper lung fields b/l.    Abdomen: BS+ Soft.  ND.  + tenderness to palpation in right upper quadrant.   Extremities: no pedal edema or calf tenderness noted       LABS:                        11.9   13.57 )-----------( 216      ( 05 Jan 2021 12:42 )             37.4     01-05    141  |  108  |  35<H>  ----------------------------<  127<H>  3.9   |  23  |  1.30    Ca    8.8      05 Jan 2021 12:42    TPro  7.6  /  Alb  3.4  /  TBili  2.0<H>  /  DBili  x   /  AST  22  /  ALT  25  /  AlkPhos  126<H>  01-05    PT/INR - ( 05 Jan 2021 12:42 )   PT: 27.7 sec;   INR: 2.47 ratio           I&O's Detail    05 Jan 2021 07:01  -  06 Jan 2021 07:00  --------------------------------------------------------  IN:    Diltiazem: 80 mL    sodium chloride 0.9%: 480 mL  Total IN: 560 mL    OUT:    Voided (mL): 200 mL  Total OUT: 200 mL    Total NET: 360 mL          Impression:  86 yo female who presented to Ed with vomiting and diagnosed with pneumonia.  Cholelithiasis noted on CT scan and patient with current RUQ tenderness.  Awaiting HIDA Scan to be performed today.     Plan:  -Continue ASA- coumadin for a-fib held.   -Continue antibiotics0-Cipro/flagyl   -Increase activity with PT, OOB, Ambulate  -Patient instructed on and encouraged incentive spirometry use  -Will follow up HIDA results.   -f/u AM labs  -Continue care per primary team.   -will discuss with Dr. Shultz

## 2021-01-06 NOTE — PROGRESS NOTE ADULT - PROBLEM SELECTOR PLAN 5
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

## 2021-01-06 NOTE — DISCHARGE NOTE PROVIDER - NSDCFUADDINST_GEN_ALL_CORE_FT
Recommend call to schedule your follow up appointments with your doctors (primary care doctor)  Recommend that you take your medications as detailed in your medication reconciliation  Recommend return to the ED for worsening of your medical condition

## 2021-01-06 NOTE — PROGRESS NOTE ADULT - PROBLEM SELECTOR PLAN 2
- Patient reported pain under L breast, likely pleuritic pain due to PNA  - CXR demonstrating possible L perihilar infiltrate  - CTA chest demonstrating emphysema, multifocal bilateral opacities. No arterial filling defects but limited by motion artifact.  - S/p Zosyn and Vancomycin in the ED, continue Zosyn only upon admission  - F/u urine strep and legionella Ag, f/u MRSA PCR  - Leukocytosis present, patient afebrile  - ID consult Dr Anthony - CXR demonstrating possible L perihilar infiltrate  - CTA chest demonstrating emphysema, multifocal bilateral opacities  - COVID neg x2, Legionella neg  - ID consult Dr Anthony: Continue Zosyn and added Azithro  - Wean to RA as tolerated   - F/u urine strep and MRSA PCR

## 2021-01-06 NOTE — PROGRESS NOTE ADULT - PROBLEM SELECTOR PLAN 4
- Patient with complaints of abd pain, N/V  - CTA abd with evidence of cholelithiasis, gallbladder distension and trace gas  - S/p Zosyn/Vanc in the ED for pulmonary process, Zosyn has coverage for GI microbes as well  - Significant N/V makes patient intolerant to PO, QTc 519 on EKG, anti-emetic with IM Tigan 200mg now, can dose up to TID-QID PRN  - Pain control with IV meds for now as patient can't tolerate PO, s/p 1x IV Tylenol, may consider IV Toradol 15mg if no improvement  - Surgical PA to evaluate Patient with complaints of abd pain although seems generalized + N/V  - CTA abd with evidence of cholelithiasis, gallbladder distension and trace gas  - Continue Zosyn   - QTc 519 on EKG, anti-emetic with IM Tigan 200mg now, can dose up to TID-QID PRN  - Tylenol for mild pain management   - Surgery consult treatment pending HIDA scan results Patient with complaints of abd pain although seems generalized + N/V  - CTA abd with evidence of cholelithiasis, gallbladder distension and trace gas  - Continue Zosyn   - QTc 519 on EKG, anti-emetic with IM Tigan 200mg now, can dose up to TID-QID PRN  - Tylenol for mild pain management   - HIDA + f/u surg recs

## 2021-01-06 NOTE — DISCHARGE NOTE PROVIDER - HOSPITAL COURSE
HPI:  86yo F, with PMH/o afib (on Coumadin), CVA and h/o aneurysm repair, infrarenal AAA, HTN, HLD, mitral valve prolapse, PVD of RLE s/p stents x3, rheumatoid arthritis, s/p hysterectomy, and L cataract, BIBEMS from home c/o LUQ abdominal pain, nausea, and vomiting for the past 3 days. Patient describes vomit as pink with black and green colors. Patient's daughter does report h/o GI bleed last occurring 5-6 years ago. While being transported, EMS found patient to be hypertensive and tachycardic, noting patient had not taken PO meds in the past 2 days due to abdominal symptoms. EMS administered Lopressor 5mg and ASA 325mg.    In the ED  VS: T 97.9  /121-->150/105-->180/108 RR 19 SpO2 95% on RA  Significant labs include WBC 13.57, lactate 2.7, BUN 37, TBili 2.0, alk phos 126, BNP 02180.  CXR: Suspect patchy L perihilar infiltrate  CTA chest/abd/pelvis: Emphysema, multifocal bilateral opacities. No arterial filling defects but limited by motion artifact.Cholelithiasis, distended gallbladder with trace gas. Nonobstructive intrarenal stones. Occluded R femoral bypass grafts.  CT head: advanced periventricular and deep white matter ischemia, progressed from prior examination, multiple old lacunar infarctions in bilateral basal ganglia, aneurysm clip again noted, L temporal craniotomy/craniectomy  EKG: afib with RVR, LAD, nonspecific ST/Tw abnormality    Patient received IV Labetalol 20mg x1, Zosyn, Vancomycin, and started on diltiazem gtt. (05 Jan 2021 15:33)      ---  HOSPITAL COURSE:   Patient admitted for multifocal pneumonia shown on CT and cholecystitis confirmed with HIDA. Additionally, cardio consulted for presenting with afib in RVR controlled with IV Lopressor and Cardizem drip. ID consulted who recommend Zosyn and Azithro to cover suspicion of multifocal pneumonia and cholecystitis. Surgery recommended ____ for acute cholecystitis.     ---  CONSULTANTS:   Surg- Dr Shultz   Vascular- Dr Quintin SAUCEDO- Dr Anthony   Cardio- Dr De Los Santos     ---  TIME SPENT:  I, the attending physician, was physically present for the key portions of the evaluation and management (E/M) service provided. The total amount of time spent reviewing the hospital notes, laboratory values, imaging findings, assessing/counseling the patient, discussing with consultant physicians, social work, nursing staff was -- minutes    ---  Primary care provider was made aware of plan for discharge:      [  ] NO     [  ] YES   HPI:  86yo F, with PMH/o afib (on Coumadin), CVA and h/o aneurysm repair, infrarenal AAA, HTN, HLD, mitral valve prolapse, PVD of RLE s/p stents x3, rheumatoid arthritis, s/p hysterectomy, and L cataract, BIBEMS from home c/o LUQ abdominal pain, nausea, and vomiting for the past 3 days. Patient describes vomit as pink with black and green colors. Patient's daughter does report h/o GI bleed last occurring 5-6 years ago. While being transported, EMS found patient to be hypertensive and tachycardic, noting patient had not taken PO meds in the past 2 days due to abdominal symptoms. EMS administered Lopressor 5mg and ASA 325mg.    In the ED  VS: T 97.9  /121-->150/105-->180/108 RR 19 SpO2 95% on RA  Significant labs include WBC 13.57, lactate 2.7, BUN 37, TBili 2.0, alk phos 126, BNP 73372.  CXR: Suspect patchy L perihilar infiltrate  CTA chest/abd/pelvis: Emphysema, multifocal bilateral opacities. No arterial filling defects but limited by motion artifact.Cholelithiasis, distended gallbladder with trace gas. Nonobstructive intrarenal stones. Occluded R femoral bypass grafts.  CT head: advanced periventricular and deep white matter ischemia, progressed from prior examination, multiple old lacunar infarctions in bilateral basal ganglia, aneurysm clip again noted, L temporal craniotomy/craniectomy  EKG: afib with RVR, LAD, nonspecific ST/Tw abnormality    Patient received IV Labetalol 20mg x1, Zosyn, Vancomycin, and started on diltiazem gtt. (05 Jan 2021 15:33)      ---  HOSPITAL COURSE:   Patient admitted for multifocal pneumonia shown on CT and possible cholecystitis. HIDA scan was negative for acute cholecystitis. Additionally, cardio consulted for presenting with afib in RVR controlled with IV Lopressor and Cardizem drip. ID consulted who recommend Zosyn and Azithro to cover multifocal pneumonia. Patient weaned off supplemental oxygen and advanced to full diet.     ---  CONSULTANTS:   Surg- Dr Shultz   Vascular- Dr Ribeiro  ID- Dr Anthony   Cardio- Dr De Los Santos     ---  TIME SPENT:  I, the attending physician, was physically present for the key portions of the evaluation and management (E/M) service provided. The total amount of time spent reviewing the hospital notes, laboratory values, imaging findings, assessing/counseling the patient, discussing with consultant physicians, social work, nursing staff was -- minutes    ---  Primary care provider was made aware of plan for discharge:      [  ] NO     [  ] YES   HPI:  84yo F, with PMH/o afib (on Coumadin), CVA and h/o aneurysm repair, infrarenal AAA, HTN, HLD, mitral valve prolapse, PVD of RLE s/p stents x3, rheumatoid arthritis, s/p hysterectomy, and L cataract, BIBEMS from home c/o LUQ abdominal pain, nausea, and vomiting for the past 3 days. Patient describes vomit as pink with black and green colors. Patient's daughter does report h/o GI bleed last occurring 5-6 years ago. While being transported, EMS found patient to be hypertensive and tachycardic, noting patient had not taken PO meds in the past 2 days due to abdominal symptoms. EMS administered Lopressor 5mg and ASA 325mg.    In the ED  VS: T 97.9  /121-->150/105-->180/108 RR 19 SpO2 95% on RA  Significant labs include WBC 13.57, lactate 2.7, BUN 37, TBili 2.0, alk phos 126, BNP 37867.  CXR: Suspect patchy L perihilar infiltrate  CTA chest/abd/pelvis: Emphysema, multifocal bilateral opacities. No arterial filling defects but limited by motion artifact.Cholelithiasis, distended gallbladder with trace gas. Nonobstructive intrarenal stones. Occluded R femoral bypass grafts.  CT head: advanced periventricular and deep white matter ischemia, progressed from prior examination, multiple old lacunar infarctions in bilateral basal ganglia, aneurysm clip again noted, L temporal craniotomy/craniectomy  EKG: afib with RVR, LAD, nonspecific ST/Tw abnormality    Patient received IV Labetalol 20mg x1, Zosyn, Vancomycin, and started on diltiazem gtt. (05 Jan 2021 15:33)      ---  HOSPITAL COURSE:   Patient admitted for multifocal pneumonia shown on CT and possible cholecystitis. HIDA scan was negative for acute cholecystitis. Additionally, cardio consulted for presenting with afib in RVR controlled with IV Lopressor and Cardizem drip. ID consulted who recommend Zosyn and Azithro to cover multifocal pneumonia. Incidental finds of right femoral bypass graft occlusions and known AAA measured at 2.6 x 2.8 cm on CT  were review by Vascular surgery which can both be followed up outpatient. Patient weaned off supplemental oxygen and advanced to full diet.     ---  CONSULTANTS:   Surg- Dr Shultz   Vascular- Dr Ribeiro  ID- Dr Anthony   Cardio- Dr De Los Santos     ---  TIME SPENT:  I, the attending physician, was physically present for the key portions of the evaluation and management (E/M) service provided. The total amount of time spent reviewing the hospital notes, laboratory values, imaging findings, assessing/counseling the patient, discussing with consultant physicians, social work, nursing staff was -- minutes    ---  Primary care provider was made aware of plan for discharge:      [  ] NO     [  ] YES   HPI:  86yo F, with PMH/o afib (on Coumadin), CVA and h/o aneurysm repair, infrarenal AAA, HTN, HLD, mitral valve prolapse, PVD of RLE s/p stents x3, rheumatoid arthritis, s/p hysterectomy, and L cataract, BIBEMS from home c/o LUQ abdominal pain, nausea, and vomiting for the past 3 days. Patient describes vomit as pink with black and green colors. Patient's daughter does report h/o GI bleed last occurring 5-6 years ago. While being transported, EMS found patient to be hypertensive and tachycardic, noting patient had not taken PO meds in the past 2 days due to abdominal symptoms. EMS administered Lopressor 5mg and ASA 325mg.    In the ED  VS: T 97.9  /121-->150/105-->180/108 RR 19 SpO2 95% on RA  Significant labs include WBC 13.57, lactate 2.7, BUN 37, TBili 2.0, alk phos 126, BNP 83084.  CXR: Suspect patchy L perihilar infiltrate  CTA chest/abd/pelvis: Emphysema, multifocal bilateral opacities. No arterial filling defects but limited by motion artifact.Cholelithiasis, distended gallbladder with trace gas. Nonobstructive intrarenal stones. Occluded R femoral bypass grafts.  CT head: advanced periventricular and deep white matter ischemia, progressed from prior examination, multiple old lacunar infarctions in bilateral basal ganglia, aneurysm clip again noted, L temporal craniotomy/craniectomy  EKG: afib with RVR, LAD, nonspecific ST/Tw abnormality    Patient received IV Labetalol 20mg x1, Zosyn, Vancomycin, and started on diltiazem gtt. (05 Jan 2021 15:33)      ---  HOSPITAL COURSE:   Patient admitted for multifocal pneumonia shown on CT and possible cholecystitis. HIDA scan was negative for acute cholecystitis. Additionally, cardio consulted for presenting with afib in RVR controlled with IV Lopressor and Cardizem drip. ID consulted who recommend Zosyn and Azithro to cover multifocal pneumonia. Incidental finds of right femoral bypass graft occlusions and known AAA measured at 2.6 x 2.8 cm on CT  were review by Vascular surgery which can both be followed up outpatient. Patient weaned off supplemental oxygen and advanced to full diet. Patient seen and examined on day of discharge and is clinically optimized for discharge.    Constitutional: denies fever, chills, diaphoresis   HEENT: denies blurry vision, difficulty hearing  Respiratory: denies SOB, CRABTREE, cough, sputum production, wheezing, hemoptysis  Cardiovascular: denies CP, palpitations, edema  Gastrointestinal: denies nausea, vomiting, diarrhea, constipation, abdominal pain, melena, hematochezia   Genitourinary: denies dysuria, frequency, urgency, hematuria     T(C): 36.3 (01-08-21 @ 05:02), Max: 37.4 (01-07-21 @ 20:36)  HR: 87 (01-08-21 @ 05:02) (69 - 97)  BP: 139/78 (01-08-21 @ 05:02) (119/74 - 139/78)  RR: 17 (01-08-21 @ 05:02) (17 - 19)  SpO2: 93% (01-08-21 @ 05:02) (91% - 97%)    General: Well developed, well nourished, NAD  HEENT: NCAT, EOMI bl, moist mucous membranes   Neck: Supple, nontender, no mass  Neurology: Answers questions appropriately   Respiratory: CTA B/L, No W/R/R  CV: Irregular RR, +S1/S2, no murmurs, rubs or gallops  Abdominal: Soft, NT, ND +BSx4  Extremities: No C/C/E, + peripheral pulses  MSK: Normal ROM. Chronic arthritic changes in both hands   Skin: warm, dry    ---  CONSULTANTS:   Surg- Dr Shultz   Vascular- Dr Ribeiro  ID- Dr Anthony   Cardio- Dr De Los Santos     ---  TIME SPENT:  I, the attending physician, was physically present for the key portions of the evaluation and management (E/M) service provided. The total amount of time spent reviewing the hospital notes, laboratory values, imaging findings, assessing/counseling the patient, discussing with consultant physicians, social work, nursing staff was -- minutes    ---  Primary care provider was made aware of plan for discharge:      [  ] NO     [  ] YES   HPI:  84yo F, with PMH/o afib (on Coumadin), CVA and h/o aneurysm repair, infrarenal AAA, HTN, HLD, mitral valve prolapse, PVD of RLE s/p stents x3, rheumatoid arthritis, s/p hysterectomy, and L cataract, BIBEMS from home c/o LUQ abdominal pain, nausea, and vomiting for the past 3 days. Patient describes vomit as pink with black and green colors. Patient's daughter does report h/o GI bleed last occurring 5-6 years ago. While being transported, EMS found patient to be hypertensive and tachycardic, noting patient had not taken PO meds in the past 2 days due to abdominal symptoms. EMS administered Lopressor 5mg and ASA 325mg.    ---  HOSPITAL COURSE:   Patient admitted for multifocal pneumonia shown on CT and possible cholecystitis. HIDA scan was negative for acute cholecystitis. Additionally, cardio consulted for presenting with afib in RVR controlled with IV Lopressor and Cardizem drip. ID consulted who recommend Zosyn and Azithro to cover multifocal pneumonia and acute cholecystitis. Incidental findings of right femoral bypass graft occlusions and known AAA measured at 2.6 x 2.8 cm on CT  were review by Vascular surgery which can both be followed up outpatient. Patient weaned off supplemental oxygen and advanced to regular diet. Patient seen and examined on day of discharge and is clinically optimized for discharge.    Constitutional: denies fever, chills, diaphoresis   HEENT: denies blurry vision, difficulty hearing  Respiratory: denies SOB, CRABTREE, cough, sputum production, wheezing, hemoptysis  Cardiovascular: denies CP, palpitations, edema  Gastrointestinal: denies nausea, vomiting, diarrhea, constipation, abdominal pain, melena, hematochezia   Genitourinary: denies dysuria, frequency, urgency, hematuria     T(C): 36.3 (01-08-21 @ 05:02), Max: 37.4 (01-07-21 @ 20:36)  HR: 87 (01-08-21 @ 05:02) (69 - 97)  BP: 139/78 (01-08-21 @ 05:02) (119/74 - 139/78)  RR: 17 (01-08-21 @ 05:02) (17 - 19)  SpO2: 93% (01-08-21 @ 05:02) (91% - 97%)    PHYSICAL EXAM:  GENERAL: patient appears of advanced age, she is weak and tired but is nontoxic and in no acute distress  EYES: sclera clear, no exudates  ENMT: oropharynx clear without erythema, moist mucous membranes  LUNGS: reduced air entry bilaterally, clear to auscultation, symmetric breath sounds, no wheezing or rhonchi appreciated  HEART: soft S1/S2, irregular rate and rhythm, no murmurs noted, no noted edema to b/l LE  GASTROINTESTINAL: diffuse abdomen tenderness markedly improved. soft, nondistended, normoactive bowel sounds, no palpable masses  MUSCULOSKELETAL: b/l arthritic changes in hands. no clubbing or cyanosis, no obvious deformity  NEUROLOGIC: answers questions appropriately    ---  CONSULTANTS:   Surg- Dr Shultz   Vascular- Dr Ribeiro  ID- Dr Anthony   Cardio- Dr De Los Santos     ---  TIME SPENT:  I, the attending physician, was physically present for the key portions of the evaluation and management (E/M) service provided. The total amount of time spent reviewing the hospital notes, laboratory values, imaging findings, assessing/counseling the patient, discussing with consultant physicians, social work, nursing staff was 55 minutes

## 2021-01-06 NOTE — PHARMACOTHERAPY INTERVENTION NOTE - COMMENTS
Patient receiving azithromycin and zosyn for pneumonia. Legionella antigen returned negative. Reached out to MD (Dr. Anthony) to recommend discontinuing azithromycin. MD would like to continue therapy for a total of 3 days. Stop date entered for 1/8.

## 2021-01-06 NOTE — PROGRESS NOTE ADULT - ASSESSMENT
86yo F, with PMH/o afib (on Coumadin), CVA and h/o aneurysm repair, infrarenal AAA, HTN, HLD, mitral valve prolapse, PVD of RLE s/p stents x3, rheumatoid arthritis, s/p hysterectomy, and L cataract, BIBEMS from home c/o diffuse abdominal pain, nausea, and vomiting of 2-day duration, likely 2/2 multifocal bilateral PNA and gallbladder disease based upon imaging results.

## 2021-01-06 NOTE — PROGRESS NOTE ADULT - SUBJECTIVE AND OBJECTIVE BOX
[facility] -- Dahiana Bañuelos, Evens, Toney, Truman Falcon, Susi De Los Santos: Office # [phone]    Follow Up:  A fib, HTN, HLD    Subjective/Observations: Patient seen and examined. Patient awake, alert, resting in bed. No complaints of chest pain, dyspnea, palpitations or dizziness. No signs of orthopnea or PND. Improved abdominal pain, although  on palpation.     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    Osteoarthritis    Afib    PVD (peripheral vascular disease)    Neuropathy  (Right lower leg)    H/O cerebral aneurysm repair  brain clips    CVA (cerebral vascular accident)  (&quot;Mini-stroke&quot;,1990&#x27;s)    Rheumatoid arthritis    Hyperlipidemia    Hypertension    S/P cataract surgery  (Left eye)    Elective surgery  (&quot;Twisted bowel&quot;, 2014)    Elective surgery  (Exision of cyst on liver, 1985)    S/P ORIF (open reduction internal fixation) fracture  Left hip fx (2012) &amp; R hip fx (2013)    H/O cerebral aneurysm repair  Brain clips (1978(    Renal stone  Cysto stent placement 10/1/2014    PVD (peripheral vascular disease)  s/p RLE bypass x 3, most recent 3/2012 Right external iliac to PT bypass w/ PTFE (2012)    S/P FRED (total abdominal hysterectomy)  (1987, Hx of &quot;ovarian cancer?&quot;)    MEDICATIONS  (STANDING):  aspirin enteric coated 81 milliGRAM(s) Oral daily  atorvastatin 40 milliGRAM(s) Oral at bedtime  diltiazem    Tablet 60 milliGRAM(s) Oral every 6 hours  diltiazem Infusion 10 mG/Hr (10 mL/Hr) IV Continuous <Continuous>  gabapentin 100 milliGRAM(s) Oral three times a day  metoprolol tartrate Injectable 2.5 milliGRAM(s) IV Push every 6 hours  multivitamin 1 Tablet(s) Oral daily  pantoprazole    Tablet 40 milliGRAM(s) Oral two times a day  piperacillin/tazobactam IVPB.. 3.375 Gram(s) IV Intermittent every 8 hours  sodium chloride 0.9%. 1000 milliLiter(s) (60 mL/Hr) IV Continuous <Continuous>    MEDICATIONS  (PRN):  acetaminophen   Tablet .. 650 milliGRAM(s) Oral every 6 hours PRN Mild Pain (1 - 3)    Allergies  No Known Allergies    Vital Signs Last 24 Hrs  T(C): 36.9 (06 Jan 2021 05:18), Max: 37.1 (06 Jan 2021 00:32)  T(F): 98.4 (06 Jan 2021 05:18), Max: 98.7 (06 Jan 2021 00:32)  HR: 80 (06 Jan 2021 05:57) (77 - 140)  BP: 138/77 (06 Jan 2021 05:57) (137/82 - 182/121)  BP(mean): --  RR: 17 (06 Jan 2021 05:18) (16 - 19)  SpO2: 92% (06 Jan 2021 05:18) (92% - 95%)  I&O's Summary    05 Jan 2021 07:01  -  06 Jan 2021 07:00  --------------------------------------------------------  IN: 560 mL / OUT: 200 mL / NET: 360 mL      Weight (kg): 63.5 (01-05 @ 11:50)    TELE: A fib 70-80s  PHYSICAL EXAM:  Appearance: NAD, no distress, alert, Frail   HEENT: Moist Mucous Membranes, Anicteric  Cardiovascular: Irregular rate and rhythm, Normal S1 S2, No JVD, + murmurs, No rubs, gallops or clicks  Respiratory: Non-labored, Clear to auscultation, No rales, No rhonchi, No wheezing.   Gastrointestinal:  Soft, +tender, + BS  Neurologic: Non-focal  Skin: Warm and dry, No visible rashes or ulcers, No ecchymosis, No cyanosis  Musculoskeletal: No clubbing, No cyanosis, No joint swelling/tenderness  Psychiatry: Mood & affect appropriate  Lymph: No peripheral edema.     LABS: All Labs Reviewed:                        10.4   12.34 )-----------( 188      ( 06 Jan 2021 08:08 )             33.1                         11.9   13.57 )-----------( 216      ( 05 Jan 2021 12:42 )             37.4     06 Jan 2021 08:08    x      |  x      |  29     ----------------------------<  107    x       |  22     |  1.00   05 Jan 2021 12:42    141    |  108    |  35     ----------------------------<  127    3.9     |  23     |  1.30     Ca    8.2        06 Jan 2021 08:08  Ca    8.8        05 Jan 2021 12:42    TPro  x      /  Alb  2.7    /  TBili  x      /  DBili  x      /  AST  20     /  ALT  21     /  AlkPhos  x      06 Jan 2021 08:08  TPro  7.6    /  Alb  3.4    /  TBili  2.0    /  DBili  x      /  AST  22     /  ALT  25     /  AlkPhos  126    05 Jan 2021 12:42  PT/INR - ( 06 Jan 2021 08:08 )   PT: 31.0 sec;   INR: 2.78 ratio    PTT - ( 06 Jan 2021 08:08 )  PTT:37.4 sec  Creatine Kinase, Serum: 106 U/L (01-05-21 @ 16:27)  Troponin I, Serum: .030 ng/mL (01-05-21 @ 16:27)  Troponin I, Serum: .027 ng/mL (01-05-21 @ 12:42)  D-Dimer Assay, Quantitative: 1004 ng/mL DDU (01-05-21 @ 12:42)  Lactate, Blood: 2.0 mmol/L (01-05-21 @ 16:27)  Lactate, Blood: 2.7 mmol/L (01-05-21 @ 13:48)    12 Lead ECG:   Ventricular Rate 135 BPM  Atrial Rate 147 BPM  QRS Duration 94 ms  Q-T Interval 346 ms  QTC Calculation(Bazett) 519 ms  R Axis -41 degrees  T Axis -54 degrees  Diagnosis Line Atrial fibrillation with rapid ventricular response  Left axis deviation  Minimal voltage criteria for LVH, may be normal variant  Nonspecific ST and T wave abnormality  Abnormal ECG  Confirmed by ovidio De Los Santos ([id number]) on 1/5/2021 3:34:47 PM (01-05-21 @ 12:06)    < from: TTE Echo Doppler w/o Cont (06.10.19 @ 14:06) >  Dimensions:    LA 3.6       Normal Values: 2.0 - 4.0 cm    Ao 2.9        Normal Values: 2.0 - 3.8 cm  SEPTUM 1.0       Normal Values: 0.6 - 1.2 cm  PWT 0.9       Normal Values: 0.6 - 1.1 cm  LVIDd 4.2         Normal Values: 3.0 - 5.6 cm  LVIDs 3.4         Normal Values: 1.8 - 4.0 cm    OBSERVATIONS:  Technically difficult study  Mitral Valve: Thickened leaflets, moderate MR.  Aortic Valve/Aorta: Sclerotic trileaflet aortic valve. Mild AI  Tricuspid Valve: normal with mild TR.  Pulmonic Valve: Mild PI  Left Atrium: Enlarged  Right Atrium:normal  Left Ventricle: Endocardium not well-visualized, estimated LVEF of   45-50%. Cannot rule out segmental dysfunction  Right Ventricle: Not well-visualized  Pericardium/Pleura: normal, no significant pericardial effusion.  Pulmonary/RV Pressure:estimated PA systolic pressure of 43 mmHg assuming   an RA pressure of 10 mmHg.  LV diastolic dysfunction is present  Small right-sided pleural effusion    Conclusion:   Technically difficult study  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 Cannot rule out segmental dysfunction  Left atrial enlargement  Sclerotic trileaflet aortic valve. Mild AI  Thickened mitral valve leaflets with moderate MR  Mild TR  Small right-sided pleural effusion  < end of copied text >    < from: CT Angio Chest w/ IV Cont (01.05.21 @ 14:23) >  IMPRESSION:  1. Emphysema.  2. Multifocal pneumonia.  3. Negative for pulmonary embolism.  4. Cholelithiasis and distended gallbladder.  5. Colonic diverticulosis without CT evidence of acute diverticulitis.  6. No bowel obstruction.  7. Abdominal aortic saccular aneurysm (2.6 x 2.8 cm).  8. Right femoral bypass grafts, which appear occluded.  < end of copied text >    < from: Xray Chest 1 View- PORTABLE-Urgent (01.05.21 @ 12:44) >  Impression: Suspect left perihilar infiltrate. Recommend close follow-up  < end of copied text > [facility] -- Dahiana Bañuelos, Evens, Toney, Truman Falcon, Susi De Los Santos: Office # [phone]    Follow Up:  A fib, HTN, HLD    Subjective/Observations: Patient seen and examined. Patient awake, alert, resting in bed. No complaints of chest pain, dyspnea, palpitations or dizziness. No signs of orthopnea or PND. Improved abdominal pain, although  on palpation.     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    Osteoarthritis    Afib    PVD (peripheral vascular disease)    Neuropathy  (Right lower leg)    H/O cerebral aneurysm repair  brain clips    CVA (cerebral vascular accident)  (&quot;Mini-stroke&quot;,1990&#x27;s)    Rheumatoid arthritis    Hyperlipidemia    Hypertension    S/P cataract surgery  (Left eye)    Elective surgery  (&quot;Twisted bowel&quot;, 2014)    Elective surgery  (Exision of cyst on liver, 1985)    S/P ORIF (open reduction internal fixation) fracture  Left hip fx (2012) &amp; R hip fx (2013)    H/O cerebral aneurysm repair  Brain clips (1978(    Renal stone  Cysto stent placement 10/1/2014    PVD (peripheral vascular disease)  s/p RLE bypass x 3, most recent 3/2012 Right external iliac to PT bypass w/ PTFE (2012)    S/P FRED (total abdominal hysterectomy)  (1987, Hx of &quot;ovarian cancer?&quot;)    MEDICATIONS  (STANDING):  aspirin enteric coated 81 milliGRAM(s) Oral daily  atorvastatin 40 milliGRAM(s) Oral at bedtime  diltiazem    Tablet 60 milliGRAM(s) Oral every 6 hours  diltiazem Infusion 10 mG/Hr (10 mL/Hr) IV Continuous <Continuous>  gabapentin 100 milliGRAM(s) Oral three times a day  metoprolol tartrate Injectable 2.5 milliGRAM(s) IV Push every 6 hours  multivitamin 1 Tablet(s) Oral daily  pantoprazole    Tablet 40 milliGRAM(s) Oral two times a day  piperacillin/tazobactam IVPB.. 3.375 Gram(s) IV Intermittent every 8 hours  sodium chloride 0.9%. 1000 milliLiter(s) (60 mL/Hr) IV Continuous <Continuous>    MEDICATIONS  (PRN):  acetaminophen   Tablet .. 650 milliGRAM(s) Oral every 6 hours PRN Mild Pain (1 - 3)    Allergies  No Known Allergies    Vital Signs Last 24 Hrs  T(C): 36.9 (06 Jan 2021 05:18), Max: 37.1 (06 Jan 2021 00:32)  T(F): 98.4 (06 Jan 2021 05:18), Max: 98.7 (06 Jan 2021 00:32)  HR: 80 (06 Jan 2021 05:57) (77 - 140)  BP: 138/77 (06 Jan 2021 05:57) (137/82 - 182/121)  BP(mean): --  RR: 17 (06 Jan 2021 05:18) (16 - 19)  SpO2: 92% (06 Jan 2021 05:18) (92% - 95%)  I&O's Summary    05 Jan 2021 07:01  -  06 Jan 2021 07:00  --------------------------------------------------------  IN: 560 mL / OUT: 200 mL / NET: 360 mL      Weight (kg): 63.5 (01-05 @ 11:50)    TELE: A fib 70-80s  PHYSICAL EXAM:  Appearance: NAD, no distress, alert, Frail   HEENT: Moist Mucous Membranes, Anicteric  Cardiovascular: Irregular rate and rhythm, Normal S1 S2, No JVD, + murmurs, No rubs, gallops or clicks  Respiratory: Non-labored, Clear to auscultation, No rales, No rhonchi, No wheezing.   Gastrointestinal:  Soft, +tender, + BS  Neurologic: Non-focal  Skin: Warm and dry, No visible rashes or ulcers, No ecchymosis, No cyanosis  Musculoskeletal: No clubbing, No cyanosis, No joint swelling/tenderness  Psychiatry: Mood & affect appropriate  Lymph: No peripheral edema.     LABS: All Labs Reviewed:                        10.4   12.34 )-----------( 188      ( 06 Jan 2021 08:08 )             33.1                         11.9   13.57 )-----------( 216      ( 05 Jan 2021 12:42 )             37.4     06 Jan 2021 08:08    142    |  110    |  29     ----------------------------<  107    3.3     |  22     |  1.00   05 Jan 2021 12:42    141    |  108    |  35     ----------------------------<  127    3.9     |  23     |  1.30     Ca    8.2        06 Jan 2021 08:08  Ca    8.8        05 Jan 2021 12:42  Phos  2.5       06 Jan 2021 08:08  Mg     1.9       06 Jan 2021 08:08    TPro  6.1    /  Alb  2.7    /  TBili  2.2    /  DBili  x      /  AST  20     /  ALT  21     /  AlkPhos  107    06 Jan 2021 08:08  TPro  7.6    /  Alb  3.4    /  TBili  2.0    /  DBili  x      /  AST  22     /  ALT  25     /  AlkPhos  126    05 Jan 2021 12:42  PT/INR - ( 06 Jan 2021 08:08 )   PT: 31.0 sec;   INR: 2.78 ratio    PTT - ( 06 Jan 2021 08:08 )  PTT:37.4 sec  Creatine Kinase, Serum: 106 U/L (01-05-21 @ 16:27)  Troponin I, Serum: .030 ng/mL (01-05-21 @ 16:27)  Troponin I, Serum: .027 ng/mL (01-05-21 @ 12:42)  D-Dimer Assay, Quantitative: 1004 ng/mL DDU (01-05-21 @ 12:42)  Lactate, Blood: 2.0 mmol/L (01-05-21 @ 16:27)  Lactate, Blood: 2.7 mmol/L (01-05-21 @ 13:48)    12 Lead ECG:   Ventricular Rate 135 BPM  Atrial Rate 147 BPM  QRS Duration 94 ms  Q-T Interval 346 ms  QTC Calculation(Bazett) 519 ms  R Axis -41 degrees  T Axis -54 degrees  Diagnosis Line Atrial fibrillation with rapid ventricular response  Left axis deviation  Minimal voltage criteria for LVH, may be normal variant  Nonspecific ST and T wave abnormality  Abnormal ECG  Confirmed by ovidio De Los Santos ([id number]) on 1/5/2021 3:34:47 PM (01-05-21 @ 12:06)    < from: TTE Echo Doppler w/o Cont (06.10.19 @ 14:06) >  Dimensions:    LA 3.6       Normal Values: 2.0 - 4.0 cm    Ao 2.9        Normal Values: 2.0 - 3.8 cm  SEPTUM 1.0       Normal Values: 0.6 - 1.2 cm  PWT 0.9       Normal Values: 0.6 - 1.1 cm  LVIDd 4.2         Normal Values: 3.0 - 5.6 cm  LVIDs 3.4         Normal Values: 1.8 - 4.0 cm    OBSERVATIONS:  Technically difficult study  Mitral Valve: Thickened leaflets, moderate MR.  Aortic Valve/Aorta: Sclerotic trileaflet aortic valve. Mild AI  Tricuspid Valve: normal with mild TR.  Pulmonic Valve: Mild PI  Left Atrium: Enlarged  Right Atrium:normal  Left Ventricle: Endocardium not well-visualized, estimated LVEF of   45-50%. Cannot rule out segmental dysfunction  Right Ventricle: Not well-visualized  Pericardium/Pleura: normal, no significant pericardial effusion.  Pulmonary/RV Pressure:estimated PA systolic pressure of 43 mmHg assuming   an RA pressure of 10 mmHg.  LV diastolic dysfunction is present  Small right-sided pleural effusion    Conclusion:   Technically difficult study  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 Cannot rule out segmental dysfunction  Left atrial enlargement  Sclerotic trileaflet aortic valve. Mild AI  Thickened mitral valve leaflets with moderate MR  Mild TR  Small right-sided pleural effusion  < end of copied text >    < from: CT Angio Chest w/ IV Cont (01.05.21 @ 14:23) >  IMPRESSION:  1. Emphysema.  2. Multifocal pneumonia.  3. Negative for pulmonary embolism.  4. Cholelithiasis and distended gallbladder.  5. Colonic diverticulosis without CT evidence of acute diverticulitis.  6. No bowel obstruction.  7. Abdominal aortic saccular aneurysm (2.6 x 2.8 cm).  8. Right femoral bypass grafts, which appear occluded.  < end of copied text >    < from: Xray Chest 1 View- PORTABLE-Urgent (01.05.21 @ 12:44) >  Impression: Suspect left perihilar infiltrate. Recommend close follow-up  < end of copied text > [facility] -- Dahiana Bañuelos, Evens, Toney, Truman Falcon, Susi De Los Santos: Office # [phone]    Follow Up:  A fib, HTN, HLD    Subjective/Observations: Patient seen and examined. Patient awake, alert, resting in bed. No complaints of chest pain, dyspnea, palpitations or dizziness. on cardizem gtt. Cont nausea. No signs of orthopnea or PND. Improved abdominal pain, although  on palpation.     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    Osteoarthritis    Afib    PVD (peripheral vascular disease)    Neuropathy  (Right lower leg)    H/O cerebral aneurysm repair  brain clips    CVA (cerebral vascular accident)  (&quot;Mini-stroke&quot;,1990&#x27;s)    Rheumatoid arthritis    Hyperlipidemia    Hypertension    S/P cataract surgery  (Left eye)    Elective surgery  (&quot;Twisted bowel&quot;, 2014)    Elective surgery  (Exision of cyst on liver, 1985)    S/P ORIF (open reduction internal fixation) fracture  Left hip fx (2012) &amp; R hip fx (2013)    H/O cerebral aneurysm repair  Brain clips (1978(    Renal stone  Cysto stent placement 10/1/2014    PVD (peripheral vascular disease)  s/p RLE bypass x 3, most recent 3/2012 Right external iliac to PT bypass w/ PTFE (2012)    S/P FRED (total abdominal hysterectomy)  (1987, Hx of &quot;ovarian cancer?&quot;)    MEDICATIONS  (STANDING):  aspirin enteric coated 81 milliGRAM(s) Oral daily  atorvastatin 40 milliGRAM(s) Oral at bedtime  diltiazem    Tablet 60 milliGRAM(s) Oral every 6 hours  diltiazem Infusion 10 mG/Hr (10 mL/Hr) IV Continuous <Continuous>  gabapentin 100 milliGRAM(s) Oral three times a day  metoprolol tartrate Injectable 2.5 milliGRAM(s) IV Push every 6 hours  multivitamin 1 Tablet(s) Oral daily  pantoprazole    Tablet 40 milliGRAM(s) Oral two times a day  piperacillin/tazobactam IVPB.. 3.375 Gram(s) IV Intermittent every 8 hours  sodium chloride 0.9%. 1000 milliLiter(s) (60 mL/Hr) IV Continuous <Continuous>    MEDICATIONS  (PRN):  acetaminophen   Tablet .. 650 milliGRAM(s) Oral every 6 hours PRN Mild Pain (1 - 3)    Allergies  No Known Allergies    Vital Signs Last 24 Hrs  T(C): 36.9 (06 Jan 2021 05:18), Max: 37.1 (06 Jan 2021 00:32)  T(F): 98.4 (06 Jan 2021 05:18), Max: 98.7 (06 Jan 2021 00:32)  HR: 80 (06 Jan 2021 05:57) (77 - 140)  BP: 138/77 (06 Jan 2021 05:57) (137/82 - 182/121)  BP(mean): --  RR: 17 (06 Jan 2021 05:18) (16 - 19)  SpO2: 92% (06 Jan 2021 05:18) (92% - 95%)  I&O's Summary    05 Jan 2021 07:01  -  06 Jan 2021 07:00  --------------------------------------------------------  IN: 560 mL / OUT: 200 mL / NET: 360 mL      Weight (kg): 63.5 (01-05 @ 11:50)    TELE: A fib 70-80s  PHYSICAL EXAM:  Appearance: NAD, no distress, alert, Frail   HEENT: Moist Mucous Membranes, Anicteric  Cardiovascular: Irregular rate and rhythm, Normal S1 S2, No JVD, + murmurs, No rubs, gallops or clicks  Respiratory: Non-labored, Clear to auscultation, No rales, No rhonchi, No wheezing.   Gastrointestinal:  Soft, +tender, + BS  Neurologic: Non-focal  Skin: Warm and dry, No visible rashes or ulcers, No ecchymosis, No cyanosis  Musculoskeletal: No clubbing, No cyanosis, No joint swelling/tenderness  Psychiatry: Mood & affect appropriate  Lymph: No peripheral edema.     LABS: All Labs Reviewed:                        10.4   12.34 )-----------( 188      ( 06 Jan 2021 08:08 )             33.1                         11.9   13.57 )-----------( 216      ( 05 Jan 2021 12:42 )             37.4     06 Jan 2021 08:08    142    |  110    |  29     ----------------------------<  107    3.3     |  22     |  1.00   05 Jan 2021 12:42    141    |  108    |  35     ----------------------------<  127    3.9     |  23     |  1.30     Ca    8.2        06 Jan 2021 08:08  Ca    8.8        05 Jan 2021 12:42  Phos  2.5       06 Jan 2021 08:08  Mg     1.9       06 Jan 2021 08:08    TPro  6.1    /  Alb  2.7    /  TBili  2.2    /  DBili  x      /  AST  20     /  ALT  21     /  AlkPhos  107    06 Jan 2021 08:08  TPro  7.6    /  Alb  3.4    /  TBili  2.0    /  DBili  x      /  AST  22     /  ALT  25     /  AlkPhos  126    05 Jan 2021 12:42  PT/INR - ( 06 Jan 2021 08:08 )   PT: 31.0 sec;   INR: 2.78 ratio    PTT - ( 06 Jan 2021 08:08 )  PTT:37.4 sec  Creatine Kinase, Serum: 106 U/L (01-05-21 @ 16:27)  Troponin I, Serum: .030 ng/mL (01-05-21 @ 16:27)  Troponin I, Serum: .027 ng/mL (01-05-21 @ 12:42)  D-Dimer Assay, Quantitative: 1004 ng/mL DDU (01-05-21 @ 12:42)  Lactate, Blood: 2.0 mmol/L (01-05-21 @ 16:27)  Lactate, Blood: 2.7 mmol/L (01-05-21 @ 13:48)    12 Lead ECG:   Ventricular Rate 135 BPM  Atrial Rate 147 BPM  QRS Duration 94 ms  Q-T Interval 346 ms  QTC Calculation(Bazett) 519 ms  R Axis -41 degrees  T Axis -54 degrees  Diagnosis Line Atrial fibrillation with rapid ventricular response  Left axis deviation  Minimal voltage criteria for LVH, may be normal variant  Nonspecific ST and T wave abnormality  Abnormal ECG  Confirmed by ovidio De Los Santos ([id number]) on 1/5/2021 3:34:47 PM (01-05-21 @ 12:06)    < from: TTE Echo Doppler w/o Cont (06.10.19 @ 14:06) >  Dimensions:    LA 3.6       Normal Values: 2.0 - 4.0 cm    Ao 2.9        Normal Values: 2.0 - 3.8 cm  SEPTUM 1.0       Normal Values: 0.6 - 1.2 cm  PWT 0.9       Normal Values: 0.6 - 1.1 cm  LVIDd 4.2         Normal Values: 3.0 - 5.6 cm  LVIDs 3.4         Normal Values: 1.8 - 4.0 cm    OBSERVATIONS:  Technically difficult study  Mitral Valve: Thickened leaflets, moderate MR.  Aortic Valve/Aorta: Sclerotic trileaflet aortic valve. Mild AI  Tricuspid Valve: normal with mild TR.  Pulmonic Valve: Mild PI  Left Atrium: Enlarged  Right Atrium:normal  Left Ventricle: Endocardium not well-visualized, estimated LVEF of   45-50%. Cannot rule out segmental dysfunction  Right Ventricle: Not well-visualized  Pericardium/Pleura: normal, no significant pericardial effusion.  Pulmonary/RV Pressure:estimated PA systolic pressure of 43 mmHg assuming   an RA pressure of 10 mmHg.  LV diastolic dysfunction is present  Small right-sided pleural effusion    Conclusion:   Technically difficult study  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 Cannot rule out segmental dysfunction  Left atrial enlargement  Sclerotic trileaflet aortic valve. Mild AI  Thickened mitral valve leaflets with moderate MR  Mild TR  Small right-sided pleural effusion  < end of copied text >    < from: CT Angio Chest w/ IV Cont (01.05.21 @ 14:23) >  IMPRESSION:  1. Emphysema.  2. Multifocal pneumonia.  3. Negative for pulmonary embolism.  4. Cholelithiasis and distended gallbladder.  5. Colonic diverticulosis without CT evidence of acute diverticulitis.  6. No bowel obstruction.  7. Abdominal aortic saccular aneurysm (2.6 x 2.8 cm).  8. Right femoral bypass grafts, which appear occluded.  < end of copied text >    < from: Xray Chest 1 View- PORTABLE-Urgent (01.05.21 @ 12:44) >  Impression: Suspect left perihilar infiltrate. Recommend close follow-up  < end of copied text >

## 2021-01-06 NOTE — PROGRESS NOTE ADULT - PROBLEM SELECTOR PLAN 8
- H/o CVA and aneurysm repair, no residual weakness  - CT head on this admission with advanced periventricular and deep white matter ischemia, progressed from prior examination, multiple old lacunar infarctions  -continue ASA and Lipitor H/o CVA and aneurysm repair, no residual weakness  - CT head on this admission with advanced periventricular and deep white matter ischemia, progressed from prior examination, multiple old lacunar infarctions  - continue ASA and Lipitor

## 2021-01-06 NOTE — DISCHARGE NOTE PROVIDER - INSTRUCTIONS
Regular diet  Mechanical soft diet (pt does not have her dentures)  Low fat  Low sodium  Supplement with ensure enlive three times daily

## 2021-01-06 NOTE — DISCHARGE NOTE PROVIDER - NSDCMRMEDTOKEN_GEN_ALL_CORE_FT
aspirin 81 mg oral delayed release tablet: 1 tab(s) orally once a day  atorvastatin 40 mg oral tablet: 1 tab(s) orally once a day (at bedtime)  Coumadin 2.5 mg oral tablet: 1 tab(s) orally once a day for 5 days a week  Coumadin 5 mg oral tablet: 1 tab(s) orally 2 times a week on Wednesday and Sunday  dilTIAZem 240 mg/24 hours oral capsule, extended release: 1 cap(s) orally once a day  gabapentin 100 mg oral capsule: 1 cap(s) orally 3 times a day  hydrALAZINE 10 mg oral tablet: 1 tab(s) orally 3 times a day  metoprolol succinate 100 mg oral tablet, extended release: 1 tab(s) orally once a day  multivitamin: 1 tab(s) orally once a day  pantoprazole 40 mg oral delayed release tablet: 1 tab(s) orally every 12 hours  Vitamin C: 1 tab(s) orally once a day   acetaminophen 325 mg oral tablet: 2 tab(s) orally every 6 hours, As needed, Mild Pain (1 - 3)  amoxicillin-clavulanate 875 mg-125 mg oral tablet: 1 tab(s) orally every 12 hours   aspirin 81 mg oral delayed release tablet: 1 tab(s) orally once a day  atorvastatin 40 mg oral tablet: 1 tab(s) orally once a day (at bedtime)  Coumadin 2.5 mg oral tablet: 1 tab(s) orally once a day for 5 days a week  Coumadin 5 mg oral tablet: 1 tab(s) orally 2 times a week on Wednesday and Sunday  dilTIAZem 240 mg/24 hours oral capsule, extended release: 1 cap(s) orally once a day  gabapentin 100 mg oral capsule: 1 cap(s) orally 3 times a day  hydrALAZINE 10 mg oral tablet: 1 tab(s) orally 3 times a day  metoprolol succinate 100 mg oral tablet, extended release: 1 tab(s) orally once a day  multivitamin: 1 tab(s) orally once a day  pantoprazole 40 mg oral delayed release tablet: 1 tab(s) orally every 12 hours  Vitamin C: 1 tab(s) orally once a day

## 2021-01-06 NOTE — PROGRESS NOTE ADULT - PROBLEM SELECTOR PLAN 10
- No pharm VTE ppx as currently therapeutic INR on coumadin. Repeat INR tomorrow. L SCD for now.    IMPROVE VTE Individual Risk Assessment        RISK                                                          Points  [  ] Previous VTE                                                3  [  ] Thrombophilia                                             2  [  ] Lower limb paralysis                                   2        (unable to hold up >15 seconds)    [  ] Current Cancer                                             2         (within 6 months)  [  ] Immobilization > 24 hrs                              1  [  ] ICU/CCU stay > 24 hours                             1  [ x ] Age > 60                                                         1    IMPROVE VTE Score: 1 No pharm VTE ppx as currently therapeutic INR on coumadin. L SCD for now.    GOC: Prior GOC documentation showed family has advance directives. Will contact family in regards to GOC    IMPROVE VTE Individual Risk Assessment        RISK                                                          Points  [  ] Previous VTE                                                3  [  ] Thrombophilia                                             2  [  ] Lower limb paralysis                                   2        (unable to hold up >15 seconds)    [  ] Current Cancer                                             2         (within 6 months)  [  ] Immobilization > 24 hrs                              1  [  ] ICU/CCU stay > 24 hours                             1  [ x ] Age > 60                                                         1    IMPROVE VTE Score: 1 No pharm VTE ppx as currently therapeutic INR on coumadin. L SCD for now.    GOC: Spoke with daughter (Dixie Schmitt) over the phone who is the hcp. She will discuss with the pt on the decision of DNR/DNI    IMPROVE VTE Individual Risk Assessment        RISK                                                          Points  [  ] Previous VTE                                                3  [  ] Thrombophilia                                             2  [  ] Lower limb paralysis                                   2        (unable to hold up >15 seconds)    [  ] Current Cancer                                             2         (within 6 months)  [  ] Immobilization > 24 hrs                              1  [  ] ICU/CCU stay > 24 hours                             1  [ x ] Age > 60                                                         1    IMPROVE VTE Score: 1 No pharm VTE ppx as currently therapeutic INR. L SCD for now as pt may have cholecystectomy     GOC: Spoke with daughter (Dixie Schmitt) over the phone who is the hcp. She will discuss with the pt on the decision of DNR/DNI    IMPROVE VTE Individual Risk Assessment        RISK                                                          Points  [  ] Previous VTE                                                3  [  ] Thrombophilia                                             2  [  ] Lower limb paralysis                                   2        (unable to hold up >15 seconds)    [  ] Current Cancer                                             2         (within 6 months)  [  ] Immobilization > 24 hrs                              1  [  ] ICU/CCU stay > 24 hours                             1  [ x ] Age > 60                                                         1    IMPROVE VTE Score: 1

## 2021-01-06 NOTE — PROGRESS NOTE ADULT - PROBLEM SELECTOR PLAN 9
- Chronic  - Takes atorvastatin 40mg qD at home, continue while inpatient Chronic  - Takes atorvastatin 40mg qD at home, continue while inpatient

## 2021-01-06 NOTE — PROGRESS NOTE ADULT - ASSESSMENT
85y old Female PMH of HTN, HLD, Afib on coumadin, CVA and hx of aneurysm repair, Mitral Valve Prolapse, Peripheral Artery Disease s/p 2 stents in RLE, presents with 2 day hx of nausea and vomiting with diffuse lower abdominal tenderness, likely 2/2 multifocal bilateral PNA and gallbladder disease based upon imaging results.    A.Fib with RVR  - Patient has a hx of A.fib (though has most recently been in SR) and is on coumadin 2.5mg 5 days a week  - On presentation to the ED patient was found have a increased HR in 130'-140's  - EKG shows A.fib with RVR, no other acute changes noted  - Troponin is negative x 1  - Patient has been on diltiazem and lopressor at home though has been unable to eat anything 2/2 to vomiting and abdominal pain.  - Patient now on IV Cardizem drip at 10 mg /hr with rates in 70-80s per telemetry. Can switch to PO dosing when patient tolerate PO   - Continue IV lopressor 2.5mg q6, switch to PO when patient tolerates PO intake   - Hold coumadin for now as patient has hx of GIB and is currently unable to keep anything down because of vomiting and abdominal pain.     CHF  - ECHO 06/2019 showed EF 45-50%, mod MR, LAE, mild TR  - BNP elevated 86246, could be overall process with A fib, RVR  - No meaningful evidence of volume overload on exam, would not aggressively diurese at this time  - Please obtain transthoracic echocardiogram to assess ventricular function and r/o valvular abnormalities   - Repeat BNP in 48 hours  - Strict I/O    Hypertension   - BP: 138/77 (01-06-21 @ 05:57) (137/82 - 182/121)  - Continue Cardizem and BB   - On hydralazine 10mg po tid and diltiazem 240mg qd at home  - Continue routine hemodynamic monitoring.    PVD  - Patient has Hx of PVD and Hx of PAD s/p right femoropopliteal bypass, which got thrombosed and infected. Patient got another bypass  - CT incidental finding of 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  - Vascular seen, no acute intervention at this time. Once patient is more stable, further work-up including US and angiogram,   - Continue aspirin and statin     Hyperlipidemia  - Continue Lipitor 40 mg HS     Abdominal Pain   - Cholelithiasis noted on CT scan and patient with current RUQ tenderness  - Awaiting HIDA Scan     - Monitor and replete lytes, keep K>4, Mg>2.  - All other medical needs as per primary team.  - Other cardiovascular workup will depend on clinical course.  - Will continue to follow.    Janna White, MS FNP, [facility]  Nurse Practitioner- Cardiology   Spectra #[phone] 85y old Female PMH of HTN, HLD, Afib on coumadin, CVA and hx of aneurysm repair, Mitral Valve Prolapse, Peripheral Artery Disease s/p 2 stents in RLE, presents with 2 day hx of nausea and vomiting with diffuse lower abdominal tenderness, likely 2/2 multifocal bilateral PNA and gallbladder disease based upon imaging results.    A.Fib with RVR  - Patient has a hx of A.fib (though has most recently been in SR) and is on coumadin 2.5mg 5 days a week  - On presentation to the ED patient was found have a increased HR in 130'-140's  - EKG shows A.fib with RVR, no other acute changes noted  - Troponin is negative x 1  - Patient has been on diltiazem and lopressor at home though has been unable to eat anything 2/2 to vomiting and abdominal pain.  - Patient now on IV Cardizem drip at 10 mg /hr with rates in 70-80s per telemetry. Can switch to PO dosing when patient tolerate PO   - Continue IV lopressor 2.5mg q6, switch to PO when patient tolerates PO intake   - Hold coumadin for now as patient has hx of GIB and is currently unable to keep anything down because of vomiting and abdominal pain.     CHF  - ECHO 06/2019 showed EF 45-50%, mod MR, LAE, mild TR  - BNP elevated 56396, could be overall process with A fib, RVR  - No meaningful evidence of volume overload on exam, would not aggressively diurese at this time  - Please obtain transthoracic echocardiogram to assess ventricular function and r/o valvular abnormalities   - Repeat BNP in 48 hours  - Strict I/O    Hypertension   - BP: 138/77 (01-06-21 @ 05:57) (137/82 - 182/121)  - Continue Cardizem and BB   - On hydralazine 10mg po tid and diltiazem 240mg qd at home  - Continue routine hemodynamic monitoring.    PVD  - Patient has Hx of PVD and Hx of PAD s/p right femoropopliteal bypass, which got thrombosed and infected. Patient got another bypass  - CT incidental finding of 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  - Vascular seen, no acute intervention at this time. Once patient is more stable, further work-up including US and angiogram, though in past vascular has deferred further intervention for her extensive PAD.   - Continue aspirin and statin     Hyperlipidemia  - Continue Lipitor 40 mg HS     Abdominal Pain   - Cholelithiasis noted on CT scan and patient with current RUQ tenderness  - Awaiting HIDA Scan     - Monitor and replete lytes, keep K>4, Mg>2.  - All other medical needs as per primary team.  - Other cardiovascular workup will depend on clinical course.  - Will continue to follow.    Janna White, MS FNP, [facility]  Nurse Practitioner- Cardiology   Spectra #[phone]

## 2021-01-06 NOTE — PROGRESS NOTE ADULT - ATTENDING COMMENTS
I saw and examined the patient personally. Spoke with above provider regarding this case. I reviewed the above findings completely.  I agree with the above history, physical, and plan which I have edited where appropriate.     Awaiting HIDA, Not clinically vol ol. defer diuresis despite bnp  still on cardizem gtt. will try po cardizem in efforts to titrate off of the gtt. will need to monitor hr closely. high risk for decompensation.

## 2021-01-06 NOTE — DISCHARGE NOTE PROVIDER - CARE PROVIDER_API CALL
Tahir Lockhart  [address], [address]  [place] [address]  Phone: [phone]  Fax: [phone]  Follow Up Time:

## 2021-01-07 NOTE — PROGRESS NOTE ADULT - ATTENDING COMMENTS
HIDA scan reviewed.  No signs of acute cholecystitis.  Small air in GB seen on CT abd likely secondary to hx of ERCP.  No suggestion of wall thickening or inflammatory changes.  Gallstones in dependent portion of GB but not impacted in neck, cystic duct or CBD.    No dietary restrictions from surgical standpoint as no indication for surgical intervention presently.  Pt is high risk candidate for elective cholecystectomy given multiple comorbidities.    Continue supportive care and medical optimization.    [facility] surgery follow up with regards to PVD and Stent occlusions.    Will sign off at this time but remain available as needed.  Please reconsult PRN

## 2021-01-07 NOTE — PHYSICAL THERAPY INITIAL EVALUATION ADULT - PERTINENT HX OF CURRENT PROBLEM, REHAB EVAL
84yo F, with PMH/o afib (on Coumadin), CVA and h/o aneurysm repair, infrarenal AAA, HTN, HLD, mitral valve prolapse, PVD of RLE s/p stents x3, rheumatoid arthritis, s/p hysterectomy, and L cataract, BIBEMS from home c/o LUQ abdominal pain, nausea, and vomiting for the past 3 days.

## 2021-01-07 NOTE — PROGRESS NOTE ADULT - PROBLEM SELECTOR PLAN 10
VTE ppx: Covered by home Warfarin dose for afib      GOC: Spoke with daughter (Dixie Schmitt) over the phone who is the hcp who wants FULL CODE     IMPROVE VTE Individual Risk Assessment        RISK                                                          Points  [  ] Previous VTE                                                3  [  ] Thrombophilia                                             2  [  ] Lower limb paralysis                                   2        (unable to hold up >15 seconds)    [  ] Current Cancer                                             2         (within 6 months)  [  ] Immobilization > 24 hrs                              1  [  ] ICU/CCU stay > 24 hours                             1  [ x ] Age > 60                                                         1    IMPROVE VTE Score: 1

## 2021-01-07 NOTE — PROGRESS NOTE ADULT - SUBJECTIVE AND OBJECTIVE BOX
SURGERY PA NOTE ON BEHALF OF DR. ROBLEDO / GENERAL SURGERY:    S: Patient seen and examined at bedside.  Reports improvement in abdominal pain.  Patient was made NPO in anticipation of possible IR procedure (which was cancelled due to negative NM study), has appetite/wishes to eat.  Denies N/V.      MEDICATIONS:  acetaminophen   Tablet .. 650 milliGRAM(s) Oral every 6 hours PRN  aspirin enteric coated 81 milliGRAM(s) Oral daily  atorvastatin 40 milliGRAM(s) Oral at bedtime  azithromycin  IVPB      azithromycin  IVPB 500 milliGRAM(s) IV Intermittent every 24 hours  diltiazem    Tablet 60 milliGRAM(s) Oral every 6 hours  diltiazem    Tablet 60 milliGRAM(s) Oral every 6 hours  gabapentin 100 milliGRAM(s) Oral three times a day  metoprolol tartrate 12.5 milliGRAM(s) Oral every 6 hours  multivitamin 1 Tablet(s) Oral daily  pantoprazole    Tablet 40 milliGRAM(s) Oral two times a day  piperacillin/tazobactam IVPB.. 3.375 Gram(s) IV Intermittent every 8 hours  sodium chloride 0.9%. 1000 milliLiter(s) IV Continuous <Continuous>      O:  Vital Signs Last 24 Hrs  T(C): 37 (07 Jan 2021 07:29), Max: 37.4 (06 Jan 2021 20:49)  T(F): 98.6 (07 Jan 2021 07:29), Max: 99.3 (06 Jan 2021 20:49)  HR: 74 (07 Jan 2021 07:29) (74 - 104)  BP: 135/76 (07 Jan 2021 07:29) (135/76 - 160/91)  BP(mean): --  RR: 18 (07 Jan 2021 07:29) (18 - 18)  SpO2: 92% (07 Jan 2021 07:29) (91% - 96%)    I&O SUMMARY:    01-06-21 @ 07:01  -  01-07-21 @ 07:00  --------------------------------------------------------  IN: 0 mL / OUT: 550 mL / NET: -550 mL        PHYSICAL EXAM:  Lungs: CTA bilat without W/R/R  Card: S1S2  Abd: Soft, ND.  Some minimal tenderness to deep palpation to upper abdominal quadrants. +BS x 4.  No rebound/guarding.    Ext: Calves soft, NT, without edema bilat    LABS:                        10.3   9.14  )-----------( 177      ( 07 Jan 2021 08:02 )             33.5     01-07    144  |  112<H>  |  26<H>  ----------------------------<  89  4.6   |  24  |  1.20    Ca    8.2<L>      07 Jan 2021 08:02  Phos  2.5     01-06  Mg     1.9     01-06    TPro  6.0  /  Alb  2.6<L>  /  TBili  2.4<H>  /  DBili  x   /  AST  19  /  ALT  19  /  AlkPhos  100  01-07    PT/INR - ( 07 Jan 2021 08:02 )   PT: 18.8 sec;   INR: 1.65 ratio         PTT - ( 06 Jan 2021 08:08 )  PTT:37.4 sec    RADIOLOGY:  EXAM:  NM HEPATOBILIARY IMG                        *** ADDENDUM 01/06/2021  ***  On review, the gallbladder is faintly visualized after morphine administration.  IMPRESSION: There is no evidence of acute cholecystitis.  This change in interpretation was communicated to TYLER Turk by telephone, on 1/6/2021, at 3:45 PM, with read back.  *** END OF ADDENDUM 01/06/2021  ***  PROCEDURE DATE:  01/06/2021    INTERPRETATION:  CLINICAL INFORMATION: 85-year-old female, abdominal pain, cholelithiasis and distended gallbladder on CT, evaluate for acute cholecystitis.  RADIOPHARMACEUTICAL: 3 mCi and 3.2 mCi Tc-99m-Mebrofenin, I.V.  TECHNIQUE:  Dynamic imaging of the anterior abdomen was performed for 2.5 hours after the injection of radiotracer followed by static images of the abdomen in the anterior, right lateral and right anterior oblique projections.  Morphine 2 mg I.V. and a second dose of radiotracer were administered at 1 hour.  COMPARISON: Hepatobiliary scanfrom 7/23/2018, which was normal.  FINDINGS: There is prompt, homogeneous uptake of radiotracer by the hepatocytes. Activity is first seen in the bowel at 20 minutes. The gallbladder is not visualized at any time during the study, despite administration of morphine. There is good clearance of activity from the liver at the end of the study.  IMPRESSION: Abnormal morphine-augmented hepatobiliary scan.  Findings consistent with acute cholecystitis.  TYLER Turk was notified of these results by telephone, on 1/6/2021, at about 1:45 PM, with read back.  ***Please see the addendum at the top of this report. It may contain additional important information or changes.****  LAXMI ARZATE MD; Attending Nuclear Medicine  This document has been electronically signed. Jan 6 2021  1:43PM  Addend:LAXMI ARZATE MD; Attending Nuclear Medicine  This addendum was electronically signed on: Jan 6 2021  3:46PM.      A: Impression:  86 yo female who presented to Ed with vomiting and diagnosed with pneumonia.  Cholelithiasis noted on CT scan and patient with current RUQ tenderness.     P: Discussed with Dr. Robledo, who also reviewed HIDA images      HIDA negative for acute cholecystitis - no indication at this time for invasive or surgical treatment from surgical perspective       Patient with essentially stable, non-suggestive VS (with mild, low-grade temp spike last night)      Leukocytosis improving      Symptoms likely attributed to current PNA      Continue care per primary team      Little to no role in the continued care of this patient from surgical standpoint - will sign-off and re-consult on PRN basis

## 2021-01-07 NOTE — PROGRESS NOTE ADULT - SUBJECTIVE AND OBJECTIVE BOX
[facility]  INFECTIOUS DISEASES   [address] [place] [address]  Tel: [phone]     Fax: [phone]  =======================================================    [facility]-[id number]  CARITO CONSTANTINO     Follow up: Pneumonia and acute cholecystitis     Feels better, today, no fever, abdominal pain has improved, no more vomiting.     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  Osteoarthritis  Afib  PVD (peripheral vascular disease)  Neuropathy  (Right lower leg)  H/O cerebral aneurysm repair  brain clips  CVA (cerebral vascular accident)  (&quot;Mini-stroke&quot;,1990&#x27;s)  Rheumatoid arthritis  Hyperlipidemia  Hypertension  S/P cataract surgery  (Left eye)  Elective surgery  (&quot;Twisted bowel&quot;, 2014)  Elective surgery  (Exision of cyst on liver, 1985)  S/P ORIF (open reduction internal fixation) fracture  Left hip fx (2012) &amp; R hip fx (2013)  H/O cerebral aneurysm repair  Brain clips (1978(  Renal stone  Cysto stent placement 10/1/2014  PVD (peripheral vascular disease)  s/p RLE bypass x 3, most recent 3/2012 Right external iliac to PT bypass w/ PTFE (2012)  S/P FRED (total abdominal hysterectomy)  (1987, Hx of &quot;ovarian cancer?&quot;)    Social Hx: former smoker, no ETOH or drugs    FAMILY HISTORY:  No pertinent family history in first degree relatives    Allergies  No Known Allergies    Antibiotics:  piperacillin/tazobactam IVPB.. 3.375 Gram(s) IV Intermittent every 8 hours     REVIEW OF SYSTEMS:  CONSTITUTIONAL:  No Fever or chills  HEENT:  No diplopia or blurred vision.  No sore throat or runny nose.  CARDIOVASCULAR:  No chest pain or SOB.  RESPIRATORY:  No cough, shortness of breath, PND or orthopnea.  GASTROINTESTINAL: + nausea, vomiting, nodiarrhea.  GENITOURINARY:  No dysuria, frequency or urgency. No Blood in urine  MUSCULOSKELETAL:  no joint aches, no muscle pain  SKIN:  No change in skin, hair or nails.  NEUROLOGIC:  No paresthesias, fasciculations, seizures or weakness.  PSYCHIATRIC:  No disorder of thought or mood.  ENDOCRINE:  No heat or cold intolerance, polyuria or polydipsia.  HEMATOLOGICAL:  No easy bruising or bleeding.     Physical Exam:  Vital Signs Last 24 Hrs  T(C): 36.7 (07 Jan 2021 13:37), Max: 37.4 (06 Jan 2021 20:49)  T(F): 98.1 (07 Jan 2021 13:37), Max: 99.3 (06 Jan 2021 20:49)  HR: 76 (07 Jan 2021 13:37) (74 - 104)  BP: 130/78 (07 Jan 2021 13:37) (130/78 - 160/91)  BP(mean): --  RR: 18 (07 Jan 2021 13:37) (18 - 18)  SpO2: 91% (07 Jan 2021 13:37) (91% - 95%)  GEN: NAD  HEENT: normocephalic and atraumatic. EOMI. PERRL.    NECK: Supple.  No lymphadenopathy   LUNGS: Clear to auscultation.  HEART: Regular rate and rhythm   ABDOMEN: Soft, and nondistended.  Positive bowel sounds.  Mild upper abdominal tenderness   : No CVA tenderness  EXTREMITIES: Without any cyanosis, clubbing, rash, lesions or edema.  NEUROLOGIC: grossly intact.  PSYCHIATRIC: Appropriate affect .  SKIN: No ulceration or induration present.      Labs:                        10.3   9.14  )-----------( 177      ( 07 Jan 2021 08:02 )             33.5      01-07    144  |  112<H>  |  26<H>  ----------------------------<  89  4.6   |  24  |  1.20    Ca    8.2<L>      07 Jan 2021 08:02  Phos  2.5     01-06  Mg     1.9     01-06    TPro  6.0  /  Alb  2.6<L>  /  TBili  2.4<H>  /  DBili  x   /  AST  19  /  ALT  19  /  AlkPhos  100  01-07    Culture - Blood (collected 01-05-21 @ 18:51)  Source: .Blood Blood    Culture - Blood (collected 01-05-21 @ 18:51)  Source: .Blood Blood    WBC Count: 9.14 K/uL (01-07-21 @ 08:02)  WBC Count: 12.34 K/uL (01-06-21 @ 08:08)  WBC Count: 13.57 K/uL (01-05-21 @ 12:42)    Creatinine, Serum: 1.20 mg/dL (01-07-21 @ 08:02)  Creatinine, Serum: 1.00 mg/dL (01-06-21 @ 08:08)  Creatinine, Serum: 1.30 mg/dL (01-05-21 @ 12:42)    COVID-19 PCR: NotDetec (01-06-21 @ 06:01)  COVID-19 IgG Antibody Index: 0.06 Index (01-05-21 @ 22:41)  COVID-19 IgG Antibody Interpretation: Negative (01-05-21 @ 22:41)  COVID-19 PCR: NotDetec (01-05-21 @ 12:42)    All imaging and other data have been reviewed.  < from: CT Angio Chest w/ IV Cont (01.05.21 @ 14:23) >  IMPRESSION:  1. Emphysema.  2. Multifocal pneumonia.  3. Negative for pulmonary embolism.  4. Cholelithiasis and distended gallbladder.  5. Colonic diverticulosis without CT evidence of acute diverticulitis.  6. No bowel obstruction.  7. Abdominal aortic saccular aneurysm (2.6 x 2.8 cm).  8. Right femoral bypass grafts, which appear occluded.    < from: NM Hepatobiliary Imaging (01.06.21 @ 13:39) >  IMPRESSION: Abnormal morphine-augmented hepatobiliary scan.  Findings consistent with acute cholecystitis.    Assessment and Plan:   84yo woman with PMH of HTN, afib (on Coumadin), CVA and h/o aneurysm repair, infrarenal AAA, mitral valve prolapse, PVD of RLE s/p stents x3, rheumatoid arthritis, was admitted on 1/5 with LUQ abdominal pain, nausea, and vomiting for the past 3 days.   No fever or any other symptoms. CT showed Multifocal bilateral opacities in both lungs with emphysema and Cholelithiasis with distended gall bladder with trace gas and nonobstructive intrarenal stones.  Abdominal process such as cholecystitis and cholangitis is a possibility but also with pneumonia patients can have abdominal symptoms. Will need further work up to rule out acute cholecystitis/cholangitis.     Multilobar pneumonia, Rule out acute cholecystitis  - COVID test negative x 2   - Blood culture x 2 NGTD  - CT result noted for possible acute cholecystitis and HIDA scan reported acute cholecystitis as well  - Seen by surgery, believe that no need for any urgent surgical intervention   - Continue Zosyn 3.375gm q8h to cover acute cholecystitis and pneumonia.   - Continue azithromycin 500mg daily for 3 days    Will follow.    Sindi Anthony MD  Division of Infectious Diseases   Cell [phone] between 8am and 6pm   After 6pm and weekends please call ID service at [phone].

## 2021-01-07 NOTE — PROGRESS NOTE ADULT - SUBJECTIVE AND OBJECTIVE BOX
[facility] -- Dahiana Bañuelos, Evens, Toney, Truman Falcon Savella  Office # [phone]      Follow Up:    Afib   Subjective/Observations:   No events overnight resting comfortably in bed.  No complaints of chest pain, dyspnea, or palpitations reported. No signs of orthopnea or PND. 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    Osteoarthritis    Afib    PVD (peripheral vascular disease)    Neuropathy  (Right lower leg)    H/O cerebral aneurysm repair  brain clips    CVA (cerebral vascular accident)  (&quot;Mini-stroke&quot;,1990&#x27;s)    Rheumatoid arthritis    Hyperlipidemia    Hypertension    S/P cataract surgery  (Left eye)    Elective surgery  (&quot;Twisted bowel&quot;, 2014)    Elective surgery  (Exision of cyst on liver, 1985)    S/P ORIF (open reduction internal fixation) fracture  Left hip fx (2012) &amp; R hip fx (2013)    H/O cerebral aneurysm repair  Brain clips (1978(    Renal stone  Cysto stent placement 10/1/2014    PVD (peripheral vascular disease)  s/p RLE bypass x 3, most recent 3/2012 Right external iliac to PT bypass w/ PTFE (2012)    S/P FRED (total abdominal hysterectomy)  (1987, Hx of &quot;ovarian cancer?&quot;)        MEDICATIONS  (STANDING):  aspirin enteric coated 81 milliGRAM(s) Oral daily  atorvastatin 40 milliGRAM(s) Oral at bedtime  azithromycin  IVPB      azithromycin  IVPB 500 milliGRAM(s) IV Intermittent every 24 hours  diltiazem    Tablet 60 milliGRAM(s) Oral every 6 hours  gabapentin 100 milliGRAM(s) Oral three times a day  metoprolol tartrate 12.5 milliGRAM(s) Oral every 6 hours  multivitamin 1 Tablet(s) Oral daily  pantoprazole    Tablet 40 milliGRAM(s) Oral two times a day  piperacillin/tazobactam IVPB.. 3.375 Gram(s) IV Intermittent every 8 hours  sodium chloride 0.9%. 1000 milliLiter(s) (60 mL/Hr) IV Continuous <Continuous>    MEDICATIONS  (PRN):  acetaminophen   Tablet .. 650 milliGRAM(s) Oral every 6 hours PRN Mild Pain (1 - 3)      Allergies    No Known Allergies    Intolerances        Vital Signs Last 24 Hrs  T(C): 37 (07 Jan 2021 07:29), Max: 37.4 (06 Jan 2021 20:49)  T(F): 98.6 (07 Jan 2021 07:29), Max: 99.3 (06 Jan 2021 20:49)  HR: 74 (07 Jan 2021 07:29) (74 - 104)  BP: 135/76 (07 Jan 2021 07:29) (135/76 - 160/91)  BP(mean): --  RR: 18 (07 Jan 2021 07:29) (18 - 18)  SpO2: 92% (07 Jan 2021 07:29) (91% - 96%)    I&O's Summary    06 Jan 2021 07:01  -  07 Jan 2021 07:00  --------------------------------------------------------  IN: 0 mL / OUT: 550 mL / NET: -550 mL          PHYSICAL EXAM:  TELE: Afib   Constitutional: NAD, awake and alert, Frail   HEENT: Moist Mucous Membranes, Anicteric  Pulmonary: Non-labored, breath sounds are clear bilaterally, No wheezing, crackles or rhonchi  Cardiovascular: Irregular, S1 and S2 nl, + murmur No rubs, gallops or clicks   Gastrointestinal: Bowel Sounds present, soft, nontender.   Lymph: No lymphadenopathy. No peripheral edema.  Skin: No visible rashes or ulcers.  Psych:  Mood & affect appropriate    LABS: All Labs Reviewed:                        10.3   9.14  )-----------( 177      ( 07 Jan 2021 08:02 )             33.5                         10.4   12.34 )-----------( 188      ( 06 Jan 2021 08:08 )             33.1                         11.9   13.57 )-----------( 216      ( 05 Jan 2021 12:42 )             37.4     07 Jan 2021 08:02    144    |  112    |  26     ----------------------------<  89     4.6     |  24     |  1.20   06 Jan 2021 08:08    142    |  110    |  29     ----------------------------<  107    3.3     |  22     |  1.00   05 Jan 2021 12:42    141    |  108    |  35     ----------------------------<  127    3.9     |  23     |  1.30     Ca    8.2        07 Jan 2021 08:02  Ca    8.2        06 Jan 2021 08:08  Ca    8.8        05 Jan 2021 12:42  Phos  2.5       06 Jan 2021 08:08  Mg     1.9       06 Jan 2021 08:08    TPro  6.0    /  Alb  2.6    /  TBili  2.4    /  DBili  x      /  AST  19     /  ALT  19     /  AlkPhos  100    07 Jan 2021 08:02  TPro  6.1    /  Alb  2.7    /  TBili  2.2    /  DBili  x      /  AST  20     /  ALT  21     /  AlkPhos  107    06 Jan 2021 08:08  TPro  7.6    /  Alb  3.4    /  TBili  2.0    /  DBili  x      /  AST  22     /  ALT  25     /  AlkPhos  126    05 Jan 2021 12:42    PT/INR - ( 07 Jan 2021 08:02 )   PT: 18.8 sec;   INR: 1.65 ratio         PTT - ( 06 Jan 2021 08:08 )  PTT:37.4 sec  CARDIAC MARKERS ( 05 Jan 2021 16:27 )  .030 ng/mL / x     / 106 U/L / x     / 3.1 ng/mL  CARDIAC MARKERS ( 05 Jan 2021 12:42 )  .027 ng/mL / x     / x     / x     / 3.9 ng/mL         12 Lead ECG:   Ventricular Rate 135 BPM  Atrial Rate 147 BPM  QRS Duration 94 ms  Q-T Interval 346 ms  QTC Calculation(Bazett) 519 ms  R Axis -41 degrees  T Axis -54 degrees  Diagnosis Line Atrial fibrillation with rapid ventricular response  Left axis deviation  Minimal voltage criteria for LVH, may be normal variant  Nonspecific ST and T wave abnormality  Abnormal ECG  Confirmed by ovidio De Los Santos ([id number]) on 1/5/2021 3:34:47 PM (01-05-21 @ 12:06)    < from: TTE Echo Doppler w/o Cont (06.10.19 @ 14:06) >  Dimensions:    LA 3.6       Normal Values: 2.0 - 4.0 cm    Ao 2.9        Normal Values: 2.0 - 3.8 cm  SEPTUM 1.0       Normal Values: 0.6 - 1.2 cm  PWT 0.9       Normal Values: 0.6 - 1.1 cm  LVIDd 4.2         Normal Values: 3.0 - 5.6 cm  LVIDs 3.4         Normal Values: 1.8 - 4.0 cm    OBSERVATIONS:  Technically difficult study  Mitral Valve: Thickened leaflets, moderate MR.  Aortic Valve/Aorta: Sclerotic trileaflet aortic valve. Mild AI  Tricuspid Valve: normal with mild TR.  Pulmonic Valve: Mild PI  Left Atrium: Enlarged  Right Atrium:normal  Left Ventricle: Endocardium not well-visualized, estimated LVEF of   45-50%. Cannot rule out segmental dysfunction  Right Ventricle: Not well-visualized  Pericardium/Pleura: normal, no significant pericardial effusion.  Pulmonary/RV Pressure:estimated PA systolic pressure of 43 mmHg assuming   an RA pressure of 10 mmHg.  LV diastolic dysfunction is present  Small right-sided pleural effusion    Conclusion:   Technically difficult study  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 Cannot rule out segmental dysfunction  Left atrial enlargement  Sclerotic trileaflet aortic valve. Mild AI  Thickened mitral valve leaflets with moderate MR  Mild TR  Small right-sided pleural effusion  < end of copied text >    < from: CT Angio Chest w/ IV Cont (01.05.21 @ 14:23) >  IMPRESSION:  1. Emphysema.  2. Multifocal pneumonia.  3. Negative for pulmonary embolism.  4. Cholelithiasis and distended gallbladder.  5. Colonic diverticulosis without CT evidence of acute diverticulitis.  6. No bowel obstruction.  7. Abdominal aortic saccular aneurysm (2.6 x 2.8 cm).  8. Right femoral bypass grafts, which appear occluded.  < end of copied text >    < from: Xray Chest 1 View- PORTABLE-Urgent (01.05.21 @ 12:44) >  Impression: Suspect left perihilar infiltrate. Recommend close follow-up  < end of copied text >              [address] [place] [address]

## 2021-01-07 NOTE — PHYSICAL THERAPY INITIAL EVALUATION ADULT - GENERAL OBSERVATIONS, REHAB EVAL
Patient received supine in bed, cooperative with physical therapy, + IV, external catheter, O2 via nasal canula

## 2021-01-07 NOTE — PROGRESS NOTE ADULT - ASSESSMENT
84yo F, with PMH/o afib (on Coumadin), CVA and h/o aneurysm repair, infrarenal AAA, HTN, HLD, mitral valve prolapse, PVD of RLE s/p stents x3, rheumatoid arthritis, s/p hysterectomy, and L cataract, BIBEMS from home c/o diffuse abdominal pain, nausea, and vomiting of 2-day duration, likely 2/2 multifocal bilateral PNA.

## 2021-01-07 NOTE — PROGRESS NOTE ADULT - PROBLEM SELECTOR PLAN 1
Patient with leukocytosis and tachycardia with likely pulmonary/intra-abdominal source  - CTA with multifocal bilateral opacities and distended gallbladder with stones and trace gas  - Leukocytosis downtrending, patient afebrile  - Bcx NGTD  - Cause of sepsis likely multifocal PNA  - Continue Zosyn and added Azithro per ID recs   - Monitor vitals and labs Patient with leukocytosis and tachycardia with likely 2/2 to multifocal PNA  - CTA with multifocal bilateral opacities and distended gallbladder with stones and trace gas  -acute cholecystitis unlikely as HIDA negative  - Leukocytosis downtrending, patient afebrile  - Bcx NGTD  - Continue Zosyn and added Azithro per ID recs   - Monitor vitals and labs Patient with leukocytosis and tachycardia with likely 2/2 to multifocal PNA  - CTA with multifocal bilateral opacities and distended gallbladder with stones and trace gas  -acute cholecystitis unlikely as HIDA negative  - Leukocytosis downtrending, patient afebrile - surgery signed off  - Bcx NGTD  - Continue Zosyn and added Azithro per ID recs   - Monitor vitals and labs

## 2021-01-07 NOTE — PROGRESS NOTE ADULT - PROBLEM SELECTOR PLAN 7
Known h/o fusiform infrarenal AAA without significant change last evaluated during 1/2020 admission by vascular surgery with no acute intervention at that time, recommended outpatient follow up Known h/o fusiform infrarenal AAA without significant change last evaluated during 1/2020 admission by vascular surgery with no acute intervention at that time, recommended outpatient follow up  - vasc following, recs noted and appreciated

## 2021-01-07 NOTE — PROGRESS NOTE ADULT - PROBLEM SELECTOR PLAN 2
- CXR demonstrating possible L perihilar infiltrate  - CTA chest demonstrating emphysema, multifocal bilateral opacities  - COVID neg x2, Legionella urine neg, Strep urine neg   - ID consult Dr Anthony: Continue Zosyn and short course Azithro  - Wean to RA as tolerated  - Unclear etiology, likely gram negative PNA covered by Zosyn multifocal infiltrates - unclear etiology, likely gram negative PNA treated with Zosyn  - COVID neg x2, Legionella urine neg, Strep urine neg   - ID consult Dr Anthony: Continue Zosyn and short course Azithro  - Wean to RA as tolerated

## 2021-01-07 NOTE — PROGRESS NOTE ADULT - ASSESSMENT
85y old Female PMH of HTN, HLD, Afib on coumadin, CVA and hx of aneurysm repair, Mitral Valve Prolapse, Peripheral Artery Disease s/p 2 stents in RLE, presents with 2 day hx of nausea and vomiting with diffuse lower abdominal tenderness, likely 2/2 multifocal bilateral PNA and gallbladder disease based upon imaging results.    A.Fib with RVR  - Patient has a hx of A.fib (though has most recently been in SR) and is on coumadin 2.5mg 5 days a week  - On presentation to the ED patient was found have a increased HR in 130'-140's  - EKG shows A.fib with RVR, no other acute changes noted  - Troponin is negative x 1  - Patient has been on diltiazem and lopressor at home though has been unable to eat anything 2/2 to vomiting and abdominal pain.  - S/p Cardizem drip now on cardizem 60 mg every 6 hours   - CW metoprolol tartrate 12.5 milliGRAM(s) Oral every 6 hours  - Hold coumadin for now as patient has hx of GIB     CHF  - ECHO 06/2019 showed EF 45-50%, mod MR, LAE, mild TR  - BNP elevated 90062, could be overall process with A fib, RVR  - No meaningful evidence of volume overload on exam, would not aggressively diurese at this time  - Please obtain transthoracic echocardiogram to assess ventricular function and r/o valvular abnormalities   - Repeat BNP in 24 hours  - Strict I/O    Hypertension   - BP: 135/76 (01-07-21 @ 07:29) (135/76 - 160/91)  - Continue Cardizem and BB   - On hydralazine 10mg po tid and diltiazem 240mg qd at home  - Continue routine hemodynamic monitoring.    PVD  - Patient has Hx of PVD and Hx of PAD s/p right femoropopliteal bypass, which got thrombosed and infected. Patient got another bypass  - CT incidental finding of 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  - Vascular seen, no acute intervention at this time. Once patient is more stable, further work-up including US and angiogram, though in past vascular has deferred further intervention for her extensive PAD.   - Continue aspirin and statin     Hyperlipidemia  - Continue Lipitor 40 mg HS     Abdominal Pain   - Cholelithiasis noted on CT scan and patient with current RUQ tenderness  - FU HIDA Scan   -per surgery    - Monitor and replete lytes, keep K>4, Mg>2.  - All other medical needs as per primary team.  - Other cardiovascular workup will depend on clinical course.  - Will continue to follow.    Campbell Juan DNP, ANP-c  Cardiology   Spectra #[id number]  [phone]

## 2021-01-07 NOTE — PROGRESS NOTE ADULT - PROBLEM SELECTOR PLAN 3
Noted to be tachycardic during transport to ED by EMS, likely due to missed PO doses and acute infectious process - Known h/o afib on Coumadin 2.5mg M/T/Th/F/Sat and 5mg on Wed/Sun at home  - On admission, EKG with rapid afib, no evidence of acute ischemia  - Transition to home diltiazem 240mg qD and metoprolol succinate 100mg qD  - Elevated BNP possibly related to rapid afib though no evidence of hypervolemia on exam or on imaging, cardio recommending avoiding aggressive diuresis  - F/u TTE Noted to be tachycardic during transport to ED by EMS, likely due to missed PO doses and acute infectious process - Known h/o afib on Coumadin 2.5mg M/T/Th/F/Sat and 5mg on Wed/Sun at home  - On admission, EKG with rapid afib, no evidence of acute ischemia  - Transition to home diltiazem 240mg qD and metoprolol succinate 100mg qD  - Elevated BNP possibly related to rapid afib though no evidence of hypervolemia on exam or on imaging, cardio recommending avoiding aggressive diuresis  - TTE: low nl EF, multiple chronic valvular findigns - mod MR, TR, mild AI, sclerotic aortic valve

## 2021-01-07 NOTE — PROGRESS NOTE ADULT - ATTENDING COMMENTS
Spoke with daughter Dixie Schmitt [phone] about updates to prognosis. Questions were answered. Daughter is healthcare proxy who expressed she does NOT want DNR/DNI. Spoke with daughter Dixie Schmitt [phone] about updates to prognosis. Questions were answered. Daughter is healthcare proxy who expressed she does NOT want DNR/DNI.    I personally conducted a physical examination of the patient. I personally gathered the patient's history. I edited the above listed findings which were prepared by the listed resident physician. I personally discussed the plan of care with the patient. The questions and concerns were addressed to the best of my ability. The patient is in agreement with the listed treatment plan.     Personally reviewed:  Vital sign trends: [ x ] yes    [  ] no     [  ] n/a  Laboratory results: [ x ] yes    [  ] no     [  ] n/a  Radiology results: [ x ] yes    [  ] no     [  ] n/a  Culture results: [  x] yes    [  ] no     [  ] n/a - mssa  Consultant recommendations: [ x ] yes    [  ] no     [  ] n/a Spoke with daughter Dixie Schmitt [phone] about updates to prognosis. Questions were answered. Daughter is healthcare proxy who expressed she does NOT want DNR/DNI.    I personally conducted a physical examination of the patient. I personally gathered the patient's history. I edited the above listed findings which were prepared by the listed resident physician. I personally discussed the plan of care with the patient. The questions and concerns were addressed to the best of my ability. The patient is in agreement with the listed treatment plan.     Personally reviewed:  Vital sign trends: [ x ] yes    [  ] no     [  ] n/a  Laboratory results: [ x ] yes    [  ] no     [  ] n/a  Radiology results: [ x ] yes    [  ] no     [  ] n/a  Culture results: [  x] yes    [  ] no     [  ] n/a - mssa  Consultant recommendations: [ x ] yes    [  ] no     [  ] n/a    Recommended for TIFFANIE

## 2021-01-07 NOTE — PROGRESS NOTE ADULT - SUBJECTIVE AND OBJECTIVE BOX
Patient is a 85y old  Female who presents with a chief complaint of PNA (07 Jan 2021 11:38)      FROM ADMISSION H+P:   HPI:  86yo F, with PMH/o afib (on Coumadin), CVA and h/o aneurysm repair, infrarenal AAA, HTN, HLD, mitral valve prolapse, PVD of RLE s/p stents x3, rheumatoid arthritis, s/p hysterectomy, and L cataract, BIBEMS from home c/o LUQ abdominal pain, nausea, and vomiting for the past 3 days. Patient describes vomit as pink with black and green colors. Patient's daughter does report h/o GI bleed last occurring 5-6 years ago. While being transported, EMS found patient to be hypertensive and tachycardic, noting patient had not taken PO meds in the past 2 days due to abdominal symptoms. EMS administered Lopressor 5mg and ASA 325mg.    In the ED  VS: T 97.9  /121-->150/105-->180/108 RR 19 SpO2 95% on RA  Significant labs include WBC 13.57, lactate 2.7, BUN 37, TBili 2.0, alk phos 126, BNP 98978.  CXR: Suspect patchy L perihilar infiltrate  CTA chest/abd/pelvis: Emphysema, multifocal bilateral opacities. No arterial filling defects but limited by motion artifact.Cholelithiasis, distended gallbladder with trace gas. Nonobstructive intrarenal stones. Occluded R femoral bypass grafts.  CT head: advanced periventricular and deep white matter ischemia, progressed from prior examination, multiple old lacunar infarctions in bilateral basal ganglia, aneurysm clip again noted, L temporal craniotomy/craniectomy  EKG: afib with RVR, LAD, nonspecific ST/Tw abnormality    Patient received IV Labetalol 20mg x1, Zosyn, Vancomycin, and started on diltiazem gtt. (05 Jan 2021 15:33)      ----  INTERVAL HPI/OVERNIGHT EVENTS: Pt seen and evaluated at the bedside. No acute overnight events occurred. Pt is feeling better today. She has less abdominal pain and tenderness. She is hopeful to start eating again.    Tele: afib HR 85, no events     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    Osteoarthritis    Afib    PVD (peripheral vascular disease)    Neuropathy  (Right lower leg)    H/O cerebral aneurysm repair  brain clips    CVA (cerebral vascular accident)  (&quot;Mini-stroke&quot;,1990&#x27;s)    Rheumatoid arthritis    Hyperlipidemia    Hypertension    S/P cataract surgery  (Left eye)    Elective surgery  (&quot;Twisted bowel&quot;, 2014)    Elective surgery  (Exision of cyst on liver, 1985)    S/P ORIF (open reduction internal fixation) fracture  Left hip fx (2012) &amp; R hip fx (2013)    H/O cerebral aneurysm repair  Brain clips (1978(    Renal stone  Cysto stent placement 10/1/2014    PVD (peripheral vascular disease)  s/p RLE bypass x 3, most recent 3/2012 Right external iliac to PT bypass w/ PTFE (2012)    S/P FRED (total abdominal hysterectomy)  (1987, Hx of &quot;ovarian cancer?&quot;)        FAMILY HISTORY:  No pertinent family history in first degree relatives        Home Medications:  aspirin 81 mg oral delayed release tablet: 1 tab(s) orally once a day (05 Jan 2021 14:12)  atorvastatin 40 mg oral tablet: 1 tab(s) orally once a day (at bedtime) (05 Jan 2021 14:12)  Coumadin 2.5 mg oral tablet: 1 tab(s) orally once a day for 5 days a week (05 Jan 2021 14:12)  Coumadin 5 mg oral tablet: 1 tab(s) orally 2 times a week on Wednesday and Sunday (05 Jan 2021 15:36)  dilTIAZem 240 mg/24 hours oral capsule, extended release: 1 cap(s) orally once a day (05 Jan 2021 14:12)  gabapentin 100 mg oral capsule: 1 cap(s) orally 3 times a day (05 Jan 2021 15:36)  hydrALAZINE 10 mg oral tablet: 1 tab(s) orally 3 times a day (05 Jan 2021 15:36)  metoprolol succinate 100 mg oral tablet, extended release: 1 tab(s) orally once a day (05 Jan 2021 15:36)  multivitamin: 1 tab(s) orally once a day (05 Jan 2021 15:36)  pantoprazole 40 mg oral delayed release tablet: 1 tab(s) orally every 12 hours (05 Jan 2021 14:12)  Vitamin C: 1 tab(s) orally once a day (05 Jan 2021 15:36)      Allergies    No Known Allergies    Intolerances        ----  MEDICATIONS  (STANDING):  aspirin enteric coated 81 milliGRAM(s) Oral daily  atorvastatin 40 milliGRAM(s) Oral at bedtime  azithromycin  IVPB      azithromycin  IVPB 500 milliGRAM(s) IV Intermittent every 24 hours  diltiazem    Tablet 60 milliGRAM(s) Oral every 6 hours  gabapentin 100 milliGRAM(s) Oral three times a day  metoprolol tartrate 12.5 milliGRAM(s) Oral every 6 hours  multivitamin 1 Tablet(s) Oral daily  pantoprazole    Tablet 40 milliGRAM(s) Oral two times a day  piperacillin/tazobactam IVPB.. 3.375 Gram(s) IV Intermittent every 8 hours  sodium chloride 0.9%. 1000 milliLiter(s) (60 mL/Hr) IV Continuous <Continuous>    MEDICATIONS  (PRN):  acetaminophen   Tablet .. 650 milliGRAM(s) Oral every 6 hours PRN Mild Pain (1 - 3)      ----  REVIEW OF SYSTEMS:  CONSTITUTIONAL: denies fever, chills, fatigue, weakness  CARDIOVASCULAR: denies chest pain, chest pressure, palpitations  RESPIRATORY: denies shortness of breath, sputum production  GASTROINTESTINAL: Admits to abdominal tenderness. denies nausea, vomiting, diarrhea, abdominal pain  GENITOURINARY: denies dysuria, discharge  MUSCULOSKELETAL: denies new joint pain, muscle aches    ----  PHYSICAL EXAM:  GENERAL: patient appears well, no acute distress, appropriately interactive  EYES: sclera clear, no exudates  ENMT: oropharynx clear without erythema, moist mucous membranes  LUNGS: good air entry bilaterally, clear to auscultation, symmetric breath sounds, no wheezing or rhonchi appreciated  HEART: soft S1/S2, irregular rate and rhythm, no murmurs noted, no noted edema to b/l LE  GASTROINTESTINAL: Diffuse abdomen tenderness markedly improved. Soft, nondistended, normoactive bowel sounds, no palpable masses  MUSCULOSKELETAL: B/l arthritic changes in hands. no clubbing or cyanosis, no obvious deformity  NEUROLOGIC: answers questions appropriately    T(C): 37 (01-07-21 @ 07:29), Max: 37.4 (01-06-21 @ 20:49)  HR: 74 (01-07-21 @ 07:29) (74 - 104)  BP: 135/76 (01-07-21 @ 07:29) (135/76 - 160/91)  RR: 18 (01-07-21 @ 07:29) (18 - 18)  SpO2: 92% (01-07-21 @ 07:29) (91% - 96%)  Wt(kg): --    ----  I&O's Summary    06 Jan 2021 07:01  -  07 Jan 2021 07:00  --------------------------------------------------------  IN: 0 mL / OUT: 550 mL / NET: -550 mL        LABS:                        10.3   9.14  )-----------( 177      ( 07 Jan 2021 08:02 )             33.5     01-07    144  |  112<H>  |  26<H>  ----------------------------<  89  4.6   |  24  |  1.20    Ca    8.2<L>      07 Jan 2021 08:02  Phos  2.5     01-06  Mg     1.9     01-06    TPro  6.0  /  Alb  2.6<L>  /  TBili  2.4<H>  /  DBili  x   /  AST  19  /  ALT  19  /  AlkPhos  100  01-07    PT/INR - ( 07 Jan 2021 08:02 )   PT: 18.8 sec;   INR: 1.65 ratio         PTT - ( 06 Jan 2021 08:08 )  PTT:37.4 sec    CAPILLARY BLOOD GLUCOSE          01-05 @ 18:51   No growth to date.  --  --            ----  Personally reviewed:  Vital sign trends: [ x ] yes    [  ] no     [  ] n/a  Laboratory results: [ x ] yes    [  ] no     [  ] n/a  Radiology results: [ x ] yes    [  ] no     [  ] n/a  Culture results: [ x ] yes    [  ] no     [  ] n/a  Consultant recommendations: [ x ] yes    [  ] no     [  ] n/a         Patient is a 85y old  Female who presents with a chief complaint of PNA (07 Jan 2021 11:38)      FROM ADMISSION H+P:   HPI:  86yo F, with PMH/o afib (on Coumadin), CVA and h/o aneurysm repair, infrarenal AAA, HTN, HLD, mitral valve prolapse, PVD of RLE s/p stents x3, rheumatoid arthritis, s/p hysterectomy, and L cataract, BIBEMS from home c/o LUQ abdominal pain, nausea, and vomiting for the past 3 days. Patient describes vomit as pink with black and green colors. Patient's daughter does report h/o GI bleed last occurring 5-6 years ago. While being transported, EMS found patient to be hypertensive and tachycardic, noting patient had not taken PO meds in the past 2 days due to abdominal symptoms. EMS administered Lopressor 5mg and ASA 325mg.     ----  INTERVAL HPI/OVERNIGHT EVENTS: Pt seen and evaluated at the bedside. No acute overnight events occurred. Pt is feeling better today. She has less abdominal pain and tenderness. She is hopeful to start eating again. She denies fever/chills. Had a low grade mildly elevated temp overnight but this was asymptomatic and resolved spontaneously.     Tele: afib HR 85, no events     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    Osteoarthritis    Afib    PVD (peripheral vascular disease)    Neuropathy  (Right lower leg)    H/O cerebral aneurysm repair  brain clips    CVA (cerebral vascular accident)  (&quot;Mini-stroke&quot;,1990&#x27;s)    Rheumatoid arthritis    Hyperlipidemia    Hypertension    S/P cataract surgery  (Left eye)    Elective surgery  (&quot;Twisted bowel&quot;, 2014)    Elective surgery  (Exision of cyst on liver, 1985)    S/P ORIF (open reduction internal fixation) fracture  Left hip fx (2012) &amp; R hip fx (2013)    H/O cerebral aneurysm repair  Brain clips (1978(    Renal stone  Cysto stent placement 10/1/2014    PVD (peripheral vascular disease)  s/p RLE bypass x 3, most recent 3/2012 Right external iliac to PT bypass w/ PTFE (2012)    S/P FRED (total abdominal hysterectomy)  (1987, Hx of &quot;ovarian cancer?&quot;)        FAMILY HISTORY:  No pertinent family history in first degree relatives        Home Medications:  aspirin 81 mg oral delayed release tablet: 1 tab(s) orally once a day (05 Jan 2021 14:12)  atorvastatin 40 mg oral tablet: 1 tab(s) orally once a day (at bedtime) (05 Jan 2021 14:12)  Coumadin 2.5 mg oral tablet: 1 tab(s) orally once a day for 5 days a week (05 Jan 2021 14:12)  Coumadin 5 mg oral tablet: 1 tab(s) orally 2 times a week on Wednesday and Sunday (05 Jan 2021 15:36)  dilTIAZem 240 mg/24 hours oral capsule, extended release: 1 cap(s) orally once a day (05 Jan 2021 14:12)  gabapentin 100 mg oral capsule: 1 cap(s) orally 3 times a day (05 Jan 2021 15:36)  hydrALAZINE 10 mg oral tablet: 1 tab(s) orally 3 times a day (05 Jan 2021 15:36)  metoprolol succinate 100 mg oral tablet, extended release: 1 tab(s) orally once a day (05 Jan 2021 15:36)  multivitamin: 1 tab(s) orally once a day (05 Jan 2021 15:36)  pantoprazole 40 mg oral delayed release tablet: 1 tab(s) orally every 12 hours (05 Jan 2021 14:12)  Vitamin C: 1 tab(s) orally once a day (05 Jan 2021 15:36)      Allergies    No Known Allergies    Intolerances        ----  MEDICATIONS  (STANDING):  aspirin enteric coated 81 milliGRAM(s) Oral daily  atorvastatin 40 milliGRAM(s) Oral at bedtime  azithromycin  IVPB      azithromycin  IVPB 500 milliGRAM(s) IV Intermittent every 24 hours  diltiazem    Tablet 60 milliGRAM(s) Oral every 6 hours  gabapentin 100 milliGRAM(s) Oral three times a day  metoprolol tartrate 12.5 milliGRAM(s) Oral every 6 hours  multivitamin 1 Tablet(s) Oral daily  pantoprazole    Tablet 40 milliGRAM(s) Oral two times a day  piperacillin/tazobactam IVPB.. 3.375 Gram(s) IV Intermittent every 8 hours  sodium chloride 0.9%. 1000 milliLiter(s) (60 mL/Hr) IV Continuous <Continuous>    MEDICATIONS  (PRN):  acetaminophen   Tablet .. 650 milliGRAM(s) Oral every 6 hours PRN Mild Pain (1 - 3)      ----  REVIEW OF SYSTEMS:  CONSTITUTIONAL: denies fever, chills, fatigue, weakness  CARDIOVASCULAR: denies chest pain, chest pressure, palpitations  RESPIRATORY: denies shortness of breath, sputum production  GASTROINTESTINAL: Admits to abdominal tenderness. denies nausea, vomiting, diarrhea, abdominal pain  GENITOURINARY: denies dysuria, discharge  MUSCULOSKELETAL: denies new joint pain, muscle aches  comprehensive ROS as per above and HPI     ----  PHYSICAL EXAM:  GENERAL: patient appears of advanced age, she is weak and tired but is nontoxic and in no acute distress  EYES: sclera clear, no exudates  ENMT: oropharynx clear without erythema, moist mucous membranes  LUNGS: good air entry bilaterally, clear to auscultation, symmetric breath sounds, no wheezing or rhonchi appreciated  HEART: soft S1/S2, irregular rate and rhythm, no murmurs noted, no noted edema to b/l LE  GASTROINTESTINAL: diffuse abdomen tenderness markedly improved. Soft, nondistended, normoactive bowel sounds, no palpable masses  MUSCULOSKELETAL: B/l arthritic changes in hands. no clubbing or cyanosis, no obvious deformity  NEUROLOGIC: answers questions appropriately    T(C): 37 (01-07-21 @ 07:29), Max: 37.4 (01-06-21 @ 20:49)  HR: 74 (01-07-21 @ 07:29) (74 - 104)  BP: 135/76 (01-07-21 @ 07:29) (135/76 - 160/91)  RR: 18 (01-07-21 @ 07:29) (18 - 18)  SpO2: 92% (01-07-21 @ 07:29) (91% - 96%)  Wt(kg): --    ----  I&O's Summary    06 Jan 2021 07:01  -  07 Jan 2021 07:00  --------------------------------------------------------  IN: 0 mL / OUT: 550 mL / NET: -550 mL        LABS:                        10.3   9.14  )-----------( 177      ( 07 Jan 2021 08:02 )             33.5     01-07    144  |  112<H>  |  26<H>  ----------------------------<  89  4.6   |  24  |  1.20    Ca    8.2<L>      07 Jan 2021 08:02  Phos  2.5     01-06  Mg     1.9     01-06    TPro  6.0  /  Alb  2.6<L>  /  TBili  2.4<H>  /  DBili  x   /  AST  19  /  ALT  19  /  AlkPhos  100  01-07    PT/INR - ( 07 Jan 2021 08:02 )   PT: 18.8 sec;   INR: 1.65 ratio         PTT - ( 06 Jan 2021 08:08 )  PTT:37.4 sec    CAPILLARY BLOOD GLUCOSE          01-05 @ 18:51   No growth to date.  --  --            ----  Personally reviewed:  Vital sign trends: [ x ] yes    [  ] no     [  ] n/a  Laboratory results: [ x ] yes    [  ] no     [  ] n/a  Radiology results: [ x ] yes    [  ] no     [  ] n/a  Culture results: [ x ] yes    [  ] no     [  ] n/a  Consultant recommendations: [ x ] yes    [  ] no     [  ] n/a

## 2021-01-07 NOTE — PROGRESS NOTE ADULT - PROBLEM SELECTOR PLAN 4
Patient with complaints of abd pain although seems generalized + N/V  - CTA abd with evidence of cholelithiasis, gallbladder distension and trace gas   - QTc 519 on EKG, anti-emetic with IM Tigan 200mg now, can dose up to TID-QID PRN  - Tylenol for mild pain management   - HIDA negative ruling out acute cholecystitis   - Continue Zosyn   - Start CLD, if tolerating will continue to advance Patient with complaints of abd pain although seems generalized + N/V  - CTA abd with evidence of cholelithiasis, gallbladder distension and trace gas   - QTc 519 on EKG, anti-emetic with IM Tigan 200mg now, can dose up to TID-QID PRN  - Tylenol for mild pain management   - HIDA negative ruling out acute cholecystitis   - Continue Zosyn   - Start CLD, adv to low fat in AM, tolerated well

## 2021-01-07 NOTE — PROGRESS NOTE ADULT - PROBLEM SELECTOR PLAN 8
H/o CVA and aneurysm repair, no residual weakness  - CT head on this admission with advanced periventricular and deep white matter ischemia, progressed from prior examination, multiple old lacunar infarctions  - continue ASA and Lipitor

## 2021-01-07 NOTE — PROGRESS NOTE ADULT - PROBLEM SELECTOR PLAN 5
Stable - Chronic h/o HTN, presenting with severe with BP >180/120 with EMS, likely due to missed PO doses  - Takes Hydralazine 10mg q8h and metoprolol succinate 100mg qD at home  - BP well controlled s/p cardizem drip and IV lopressor  - Restart oral home meds as tolerated  - Monitor routine hemodynamics

## 2021-01-07 NOTE — PROGRESS NOTE ADULT - PROBLEM SELECTOR PLAN 9
Chronic  - Takes atorvastatin 40mg qD at home, continue while inpatient Chronic  - Takes atorvastatin 40mg qD at home, continue while inpatient    #Dispo:   for TIFFANIE

## 2021-01-07 NOTE — PROGRESS NOTE ADULT - PROBLEM SELECTOR PLAN 6
H/o peripheral arterial disease s/p 2 stents placed in RLE  - CTA abd/pelvis with evidence of occluded R femoral bypass graft  - Daughter, Dixie, reports patient has known occlusion of femoral graft  - Consult Dr Vargas recommends no intervention

## 2021-01-08 NOTE — PROGRESS NOTE ADULT - SUBJECTIVE AND OBJECTIVE BOX
[facility]  INFECTIOUS DISEASES   [address] [place] [address]  Tel: [phone]     Fax: [phone]  =======================================================    [employer] [facility]-[id number]  CARITO CONSTANTINO     Follow up: Pneumonia and acute cholecystitis     Feels better, today, no fever, abdominal pain has improved, no more vomiting.   On regular diet tolerating well.     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  Osteoarthritis  Afib  PVD (peripheral vascular disease)  Neuropathy  (Right lower leg)  H/O cerebral aneurysm repair  brain clips  CVA (cerebral vascular accident)  (&quot;Mini-stroke&quot;,1990&#x27;s)  Rheumatoid arthritis  Hyperlipidemia  Hypertension  S/P cataract surgery  (Left eye)  Elective surgery  (&quot;Twisted bowel&quot;, 2014)  Elective surgery  (Exision of cyst on liver, 1985)  S/P ORIF (open reduction internal fixation) fracture  Left hip fx (2012) &amp; R hip fx (2013)  H/O cerebral aneurysm repair  Brain clips (1978(  Renal stone  Cysto stent placement 10/1/2014  PVD (peripheral vascular disease)  s/p RLE bypass x 3, most recent 3/2012 Right external iliac to PT bypass w/ PTFE (2012)  S/P FRED (total abdominal hysterectomy)  (1987, Hx of &quot;ovarian cancer?&quot;)    Social Hx: former smoker, no ETOH or drugs    FAMILY HISTORY:  No pertinent family history in first degree relatives    Allergies  No Known Allergies    Antibiotics:  piperacillin/tazobactam IVPB.. 3.375 Gram(s) IV Intermittent every 8 hours     REVIEW OF SYSTEMS:  CONSTITUTIONAL:  No Fever or chills  HEENT:  No diplopia or blurred vision.  No sore throat or runny nose.  CARDIOVASCULAR:  No chest pain or SOB.  RESPIRATORY:  No cough, shortness of breath, PND or orthopnea.  GASTROINTESTINAL: + nausea, vomiting, nodiarrhea.  GENITOURINARY:  No dysuria, frequency or urgency. No Blood in urine  MUSCULOSKELETAL:  no joint aches, no muscle pain  SKIN:  No change in skin, hair or nails.  NEUROLOGIC:  No paresthesias, fasciculations, seizures or weakness.  PSYCHIATRIC:  No disorder of thought or mood.  ENDOCRINE:  No heat or cold intolerance, polyuria or polydipsia.  HEMATOLOGICAL:  No easy bruising or bleeding.     Physical Exam:  Vital Signs Last 24 Hrs  T(C): 36.3 (08 Jan 2021 05:02), Max: 37.4 (07 Jan 2021 20:36)  T(F): 97.3 (08 Jan 2021 05:02), Max: 99.4 (07 Jan 2021 20:36)  HR: 87 (08 Jan 2021 05:02) (69 - 97)  BP: 139/78 (08 Jan 2021 05:02) (119/74 - 139/78)  BP(mean): --  RR: 17 (08 Jan 2021 05:02) (17 - 19)  SpO2: 93% (08 Jan 2021 05:02) (91% - 97%)  GEN: NAD  HEENT: normocephalic and atraumatic. EOMI. PERRL.    NECK: Supple.  No lymphadenopathy   LUNGS: Clear to auscultation.  HEART: Regular rate and rhythm   ABDOMEN: Soft, and nondistended.  Positive bowel sounds.  Mild upper abdominal tenderness   : No CVA tenderness  EXTREMITIES: Without any cyanosis, clubbing, rash, lesions or edema.  NEUROLOGIC: grossly intact.  PSYCHIATRIC: Appropriate affect .  SKIN: No ulceration or induration present.    Labs:                        9.4    7.63  )-----------( 160      ( 08 Jan 2021 05:47 )             30.7      01-08    142  |  111<H>  |  23  ----------------------------<  93  3.7   |  24  |  1.40<H>    Ca    7.7<L>      08 Jan 2021 05:47    TPro  6.0  /  Alb  2.6<L>  /  TBili  2.4<H>  /  DBili  x   /  AST  19  /  ALT  19  /  AlkPhos  100  01-07    Culture - Blood (collected 01-05-21 @ 18:51)  Source: .Blood Blood    Culture - Blood (collected 01-05-21 @ 18:51)  Source: .Blood Blood    WBC Count: 7.63 K/uL (01-08-21 @ 05:47)  WBC Count: 9.14 K/uL (01-07-21 @ 08:02)  WBC Count: 12.34 K/uL (01-06-21 @ 08:08)  WBC Count: 13.57 K/uL (01-05-21 @ 12:42)    Creatinine, Serum: 1.40 mg/dL (01-08-21 @ 05:47)  Creatinine, Serum: 1.20 mg/dL (01-07-21 @ 08:02)  Creatinine, Serum: 1.00 mg/dL (01-06-21 @ 08:08)  Creatinine, Serum: 1.30 mg/dL (01-05-21 @ 12:42)    COVID-19 PCR: NotDetec (01-06-21 @ 06:01)  COVID-19 IgG Antibody Index: 0.06 Index (01-05-21 @ 22:41)  COVID-19 IgG Antibody Interpretation: Negative (01-05-21 @ 22:41)  COVID-19 PCR: NotDetec (01-05-21 @ 12:42)    All imaging and other data have been reviewed.  < from: CT Angio Chest w/ IV Cont (01.05.21 @ 14:23) >  IMPRESSION:  1. Emphysema.  2. Multifocal pneumonia.  3. Negative for pulmonary embolism.  4. Cholelithiasis and distended gallbladder.  5. Colonic diverticulosis without CT evidence of acute diverticulitis.  6. No bowel obstruction.  7. Abdominal aortic saccular aneurysm (2.6 x 2.8 cm).  8. Right femoral bypass grafts, which appear occluded.    < from: NM Hepatobiliary Imaging (01.06.21 @ 13:39) >  IMPRESSION: Abnormal morphine-augmented hepatobiliary scan.  Findings consistent with acute cholecystitis.    Assessment and Plan:   86yo woman with PMH of HTN, afib (on Coumadin), CVA and h/o aneurysm repair, infrarenal AAA, mitral valve prolapse, PVD of RLE s/p stents x3, rheumatoid arthritis, was admitted on 1/5 with LUQ abdominal pain, nausea, and vomiting for the past 3 days.   No fever or any other symptoms. CT showed Multifocal bilateral opacities in both lungs with emphysema and Cholelithiasis with distended gall bladder with trace gas and nonobstructive intrarenal stones.  Abdominal process such as cholecystitis and cholangitis is a possibility but also with pneumonia patients can have abdominal symptoms. Will need further work up to rule out acute cholecystitis/cholangitis.     Multilobar pneumonia, Rule out acute cholecystitis  - COVID test negative x 2   - Blood culture x 2 NGTD  - CT result noted for possible acute cholecystitis and HIDA scan reported acute cholecystitis as well  - Seen by surgery, believe that no need for any urgent surgical intervention   - Continue Zosyn 3.375gm q8h While in the hosptial  - Completed 3 days of azithromycin  - Will switch to Augmentin q12 for one more week, when ready for discharge.   - Follow up with surgery after discharge     Will sign off please call with any question.     Sindi Anthony MD  Division of Infectious Diseases   Cell [phone] between 8am and 6pm   After 6pm and weekends please call ID service at [phone].

## 2021-01-08 NOTE — PROGRESS NOTE ADULT - ASSESSMENT
85y old Female PMH of HTN, HLD, Afib on coumadin, CVA and hx of aneurysm repair, Mitral Valve Prolapse, Peripheral Artery Disease s/p 2 stents in RLE, presents with 2 day hx of nausea and vomiting with diffuse lower abdominal tenderness, likely 2/2 multifocal bilateral PNA and gallbladder disease based upon imaging results.    A.Fib with RVR  - Patient has a hx of A.fib (though has most recently been in SR) and is on coumadin 2.5mg 5 days a week  - On presentation to the ED patient was found have a increased HR in 130'-140's  - EKG shows A.fib with RVR, no other acute changes noted  - Patient has been on diltiazem and lopressor at home though has been unable to eat anything 2/2 to vomiting and abdominal pain.  - S/p Cardizem drip now on cardizem  mg daily  - CW metoprolol tartrate 12.5 milliGRAM(s) Oral every 6 hours can convert to long acting  - Dose coumadin to maintain INR of 2-3     CHF  - ECHO 06/2019 showed EF 45-50%, mod MR, LAE, mild TR  - BNP elevated 27463, could be overall process with A fib, RVR  - No meaningful evidence of volume overload on exam, would not aggressively diurese at this time  - TTE Technically difficult study low-nL LV & RV size and function mod mr, mild tr  - Repeat BNP w/ improvement   - Strict I/O    Hypertension   - BP: 139/78 (01-08-21 @ 05:02) (119/74 - 139/78)  - Continue Cardizem and BB   - On hydralazine 10mg po tid and diltiazem 240mg qd at home  - Continue routine hemodynamic monitoring.    PVD  - Patient has Hx of PVD and Hx of PAD s/p right femoropopliteal bypass, which got thrombosed and infected. Patient got another bypass  - CT incidental finding of 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  - Vascular seen, no acute intervention at this time. Once patient is more stable, further work-up including US and angiogram, though in past vascular has deferred further intervention for her extensive PAD.   - Continue aspirin and statin     Hyperlipidemia  - Continue Lipitor 40 mg HS     Abdominal Pain   - Cholelithiasis noted on CT scan and patient with current RUQ tenderness  -per surgery/primary team     - Monitor and replete lytes, keep K>4, Mg>2.  - All other medical needs as per primary team.  - Other cardiovascular workup will depend on clinical course.  - Will continue to follow.    Campbell Juan DNP, ANP-c  Cardiology   Spectra #[id number]  [phone]

## 2021-01-08 NOTE — DISCHARGE NOTE NURSING/CASE MANAGEMENT/SOCIAL WORK - PATIENT PORTAL LINK FT
You can access the FollowMyHealth Patient Portal offered by [facility] by registering at the following website: [email or web address]. By joining [employer]’s FollowMyHealth portal, you will also be able to view your health information using other applications (apps) compatible with our system.

## 2021-01-08 NOTE — PROGRESS NOTE ADULT - PROVIDER SPECIALTY LIST ADULT
Infectious Disease
Surgery
Vascular Surgery
Cardiology
Cardiology
Surgery
Cardiology
Infectious Disease
Hospitalist
Hospitalist

## 2021-01-08 NOTE — PROGRESS NOTE ADULT - NSHPATTENDINGPLANDISCUSS_GEN_ALL_CORE
Dr. Parra.
pt, SW, CM, RN, residency team, family - re: tx plan, disposition planning, above detailed plan
pt, SW, CM, RN, residency team, family - re: tx plan, disposition planning, above detailed plan

## 2021-01-08 NOTE — PROGRESS NOTE ADULT - SUBJECTIVE AND OBJECTIVE BOX
[facility] -- Dahiana Bañuelos, Evens, Toney, Truman Falcon Savella  Office # [phone]      Follow Up:    Afib   Subjective/Observations:   No events overnight resting comfortably in bed.  No complaints of chest pain, dyspnea, or palpitations reported. No signs of orthopnea or PND.     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    Osteoarthritis    Afib    PVD (peripheral vascular disease)    Neuropathy  (Right lower leg)    H/O cerebral aneurysm repair  brain clips    CVA (cerebral vascular accident)  (&quot;Mini-stroke&quot;,1990&#x27;s)    Rheumatoid arthritis    Hyperlipidemia    Hypertension    S/P cataract surgery  (Left eye)    Elective surgery  (&quot;Twisted bowel&quot;, 2014)    Elective surgery  (Exision of cyst on liver, 1985)    S/P ORIF (open reduction internal fixation) fracture  Left hip fx (2012) &amp; R hip fx (2013)    H/O cerebral aneurysm repair  Brain clips (1978(    Renal stone  Cysto stent placement 10/1/2014    PVD (peripheral vascular disease)  s/p RLE bypass x 3, most recent 3/2012 Right external iliac to PT bypass w/ PTFE (2012)    S/P FRED (total abdominal hysterectomy)  (1987, Hx of &quot;ovarian cancer?&quot;)        MEDICATIONS  (STANDING):  aspirin enteric coated 81 milliGRAM(s) Oral daily  atorvastatin 40 milliGRAM(s) Oral at bedtime  azithromycin  IVPB      azithromycin  IVPB 500 milliGRAM(s) IV Intermittent every 24 hours  diltiazem    milliGRAM(s) Oral daily  gabapentin 100 milliGRAM(s) Oral three times a day  metoprolol tartrate 12.5 milliGRAM(s) Oral every 6 hours  multivitamin 1 Tablet(s) Oral daily  pantoprazole    Tablet 40 milliGRAM(s) Oral two times a day  piperacillin/tazobactam IVPB.. 3.375 Gram(s) IV Intermittent every 8 hours  sodium chloride 0.9%. 1000 milliLiter(s) (60 mL/Hr) IV Continuous <Continuous>  warfarin 2.5 milliGRAM(s) Oral once    MEDICATIONS  (PRN):  acetaminophen   Tablet .. 650 milliGRAM(s) Oral every 6 hours PRN Mild Pain (1 - 3)      Allergies    No Known Allergies    Intolerances        Vital Signs Last 24 Hrs  T(C): 36.3 (08 Jan 2021 05:02), Max: 37.4 (07 Jan 2021 20:36)  T(F): 97.3 (08 Jan 2021 05:02), Max: 99.4 (07 Jan 2021 20:36)  HR: 87 (08 Jan 2021 05:02) (69 - 97)  BP: 139/78 (08 Jan 2021 05:02) (119/74 - 139/78)  BP(mean): --  RR: 17 (08 Jan 2021 05:02) (17 - 19)  SpO2: 93% (08 Jan 2021 05:02) (91% - 97%)    I&O's Summary    07 Jan 2021 07:01  -  08 Jan 2021 07:00  --------------------------------------------------------  IN: 700 mL / OUT: 700 mL / NET: 0 mL          PHYSICAL EXAM:  TELE: Afib   Constitutional: NAD, awake and alert, Frail   HEENT: Moist Mucous Membranes  Pulmonary: Non-labored, breath sounds are clear bilaterally, No wheezing, crackles or rhonchi  Cardiovascular: Irregular, S1 and S2 nl, + murmur No rubs, gallops or clicks   Gastrointestinal: Bowel Sounds present, soft, nontender.   Lymph: No lymphadenopathy. No peripheral edema.  Skin: No visible rashes or ulcers.  Psych:  Mood & affect appropriate    LABS: All Labs Reviewed:                        9.4    7.63  )-----------( 160      ( 08 Jan 2021 05:47 )             30.7                         10.3   9.14  )-----------( 177      ( 07 Jan 2021 08:02 )             33.5                         10.4   12.34 )-----------( 188      ( 06 Jan 2021 08:08 )             33.1     08 Jan 2021 05:47    142    |  111    |  23     ----------------------------<  93     3.7     |  24     |  1.40   07 Jan 2021 08:02    144    |  112    |  26     ----------------------------<  89     4.6     |  24     |  1.20   06 Jan 2021 08:08    142    |  110    |  29     ----------------------------<  107    3.3     |  22     |  1.00     Ca    7.7        08 Jan 2021 05:47  Ca    8.2        07 Jan 2021 08:02  Ca    8.2        06 Jan 2021 08:08  Phos  2.5       06 Jan 2021 08:08  Mg     1.9       06 Jan 2021 08:08    TPro  6.0    /  Alb  2.6    /  TBili  2.4    /  DBili  x      /  AST  19     /  ALT  19     /  AlkPhos  100    07 Jan 2021 08:02  TPro  6.1    /  Alb  2.7    /  TBili  2.2    /  DBili  x      /  AST  20     /  ALT  21     /  AlkPhos  107    06 Jan 2021 08:08  TPro  7.6    /  Alb  3.4    /  TBili  2.0    /  DBili  x      /  AST  22     /  ALT  25     /  AlkPhos  126    05 Jan 2021 12:42    PT/INR - ( 08 Jan 2021 05:47 )   PT: 17.7 sec;   INR: 1.54 ratio            12 Lead ECG:   Ventricular Rate 135 BPM  Atrial Rate 147 BPM  QRS Duration 94 ms  Q-T Interval 346 ms  QTC Calculation(Bazett) 519 ms  R Axis -41 degrees  T Axis -54 degrees  Diagnosis Line Atrial fibrillation with rapid ventricular response  Left axis deviation  Minimal voltage criteria for LVH, may be normal variant  Nonspecific ST and T wave abnormality  Abnormal ECG  Confirmed by ovidio De Los Santos ([id number]) on 1/5/2021 3:34:47 PM (01-05-21 @ 12:06)    < from: TTE Echo Complete w/o Contrast w/ Doppler (01.06.21 @ 16:57) >  EXAM:  ECHO TTE WO CON COMP W DOPP         PROCEDURE DATE:  01/06/2021        INTERPRETATION:  INDICATION: Palpitations  Sonographer TH    Blood Pressure 149/83    Height 154 cm     Weight 63 kg       BSA 1.6 sq m    Dimensions:  LA 4.4       Normal Values: 2.0 - 4.0 cm  Ao 2.6        Normal Values: 2.0 - 3.8 cm  SEPTUM 0.7       Normal Values: 0.6 - 1.2 cm  PWT 0.8       Normal Values: 0.6 - 1.1 cm  LVIDd 4.8         Normal Values: 3.0 - 5.6 cm  LVIDs 3.3         Normal Values: 1.8 - 4.0 cm      OBSERVATIONS:  Technically difficult study  Mitral Valve: Thickened leaflets, moderate MR.  Aortic Valve/Aorta: Sclerotic trileaflet aortic valve with normal opening. Mild AI  Tricuspid Valve: normal with trace TR.  Pulmonic Valve: Mild PI  Left Atrium: Enlarged  Right Atrium: Not well-visualized  Left Ventricle: Low-normal left ventricular systolic function, estimated LVEF of 50%.  Right Ventricle: Grossly normal size and systolic function.  Pericardium/Pleura: normal, no significant pericardial effusion.  Pulmonary/RV Pressure: estimated PA systolic pressure of 29 mmHg      Conclusion:  Technically difficult study  Low-normal left ventricular systolic function, estimated LVEF of 50%.  Grossly normal RV size and systolic function.  Left atrial enlargement  Sclerotic trileaflet aortic valve, mild AI.  Moderate MR  Mild TR.              JACQUELINE GOODEN MD; Attending Cardiologist  This document has been electronically signed. Jan 7 2021  5:51PM    < end of copied text >        < from: CT Angio Chest w/ IV Cont (01.05.21 @ 14:23) >  IMPRESSION:  1. Emphysema.  2. Multifocal pneumonia.  3. Negative for pulmonary embolism.  4. Cholelithiasis and distended gallbladder.  5. Colonic diverticulosis without CT evidence of acute diverticulitis.  6. No bowel obstruction.  7. Abdominal aortic saccular aneurysm (2.6 x 2.8 cm).  8. Right femoral bypass grafts, which appear occluded.  < end of copied text >    < from: Xray Chest 1 View- PORTABLE-Urgent (01.05.21 @ 12:44) >  Impression: Suspect left perihilar infiltrate. Recommend close follow-up  < end of copied text >

## 2021-01-13 NOTE — DIETITIAN INITIAL EVALUATION ADULT. - PROBLEM SELECTOR PLAN 2
Patient with Hb 8.1 on admission labs, on chart review baseline Hb 9-10  -Likely due to blood loss from epistaxis, resultant weakness  -Now without signs/symptoms of active bleeding  -Monitor H/H
none

## 2021-01-15 NOTE — ED PROVIDER NOTE - CARE PLAN
Principal Discharge DX:	Fever  Secondary Diagnosis:	Altered mental status   Principal Discharge DX:	Fever  Secondary Diagnosis:	Altered mental status  Secondary Diagnosis:	Colitis

## 2021-01-15 NOTE — ED PROVIDER NOTE - NEUROLOGICAL, MLM
Unable to perform neuro exam, pt lethargic, responds to verbal stimuli but not verbal, does not follow commands

## 2021-01-15 NOTE — ED ADULT NURSE NOTE - OBJECTIVE STATEMENT
Pt received sitting on stretcher in NAD. Pt AOx2  no complaint voiced RR even unlabored. Ab tender on palpation. Denies Nausea, Vomiting, Diarrhea. Skin warm, dry, color appropriate for age and race.

## 2021-01-15 NOTE — ED ADULT TRIAGE NOTE - CHIEF COMPLAINT QUOTE
BIBEMS from [facility] for hypoxia and lethargy. Also noted with a fever of 101 at facility, Tylenol given prior to arrival.

## 2021-01-15 NOTE — ED PROVIDER NOTE - PROGRESS NOTE DETAILS
pt on reevaluation, more alert, pt complaining of abd pain and diffusely ttp on exam , no rebound or guarding discussed case with Dr. Kauffman will admit

## 2021-01-15 NOTE — ED ADULT NURSE NOTE - CHIEF COMPLAINT QUOTE
BIBEMS from [facility] for hypoxia and lethargy. Also noted with a fever of 101 at facility, Tylenol given prior to arrival.

## 2021-01-15 NOTE — ED PROVIDER NOTE - OBJECTIVE STATEMENT
86 yo F PMHx Afib (on Coumadin), CVA and h/o aneurysm repair, infrarenal AAA, HTN, HLD, mitral valve prolapse, PVD of RLE s/p stents x3, rheumatoid arthritis, s/p hysterectomy, and L cataract, recently d/ambreen (9 days ago) from [facility] for PNA BIBEMS from [facility] for evaluation of hypoxia 87% RA, temp 101 F and lethargy x today as per accompanying documentation. Upon evaluation, pt found sleeping, responds to verbal stimuli but lethargic unable to provide history

## 2021-01-15 NOTE — ED PROVIDER NOTE - ATTENDING CONTRIBUTION TO CARE
Pt seen and examined and d/w PA.  agree with a and p.  pt is a 84 yo female extensive pmhx including cva, afib on coumadin. pt sp d/c for pn 9 days ago. pt p/w fever and lethargy.  pt on exam lethargic but arousable, confused, mm dry, lungs cta, abd soft nt,  no c/c/e, 2+ pulses.  pt with slight elevated wbc,  elevated bun/cr. pt with neg ct head for bleed, cultures, lactate wnl, ivf, abx,  check ct chest  and abd,admit, check covid test

## 2021-01-15 NOTE — ED PROVIDER NOTE - CLINICAL SUMMARY MEDICAL DECISION MAKING FREE TEXT BOX
84 yo F dc'ed 9 days ago for PNA p/w lethargy, hypoxia and fever as per facility documentation-- pt found to be sleeping, awakens to verbal stimuli but does not follow commands, not verbal--- concern for CVA/bleed given mental status changes unk LKW vs sepsis, vs covid

## 2021-01-15 NOTE — ED CLERICAL - NS ED CLERK NOTE PRE-ARRIVAL INFORMATION; ADDITIONAL PRE-ARRIVAL INFORMATION
This patient is eligible for outpatient care navigation through the [facility] readmission reduction initiative. This patient will be engaged by the transitional care management team to enroll into this program. Please call the number above to facilitate this enrollment or for close follow up. Pneumonia

## 2021-01-16 NOTE — H&P ADULT - HISTORY OF PRESENT ILLNESS
84yo F, with PMH afib (on Coumadin), CVA and h/o aneurysm repair, infrarenal AAA, HTN, HLD, mitral valve prolapse, PVD of RLE s/p stents x3, rheumatoid arthritis, s/p hysterectomy, and L cataract, BIBEMS from [facility] for hypoxia, fever and lethargy. Pt was found to be satting 87% on RA and temperature of 101 F as per facility documentation. Pt tested positive for COVID on 1/12/21. Pt was lethargic and history is unable to be obtained.  After a few hours when seen, pt was more alert and complaining of diffuse abdominal pain.   Of note, pt was recently admitted to [place] (1/5/21-1/8/21) for multifocal PNA and was discharged on antibiotics and supplemental oxygen.   History was obtained from daughter Dixie.   As per daughter, pt     ED Course:   Vitals: BP: 137/95, HR:103 , Temp: 97.6 F, RR: 16, SpO2: 100% on NRB -->100% on 3L NC, desaturates to 90% on RA   Labs: WBC: 11.27, H/H: 11.2/34.6, neut: 10.35, ddimer: 777, PT/INR: 19/1.66, lactate: 1.0, bicarb: 17, BUN/Cr: 41/1.70, Ca: 7.6, GFR: 27  COVID PCR POSITIVE   VBG: pH: 7.37, pCO2: 36, pO2: 65, HCO3: 21, SpO2: 90%    Imaging:   CXR: left sided infiltrate improved when compared to CXR from 1/5 - pending official read   CT Head: No significant interval change compared to the previous CT of January 5, 2021.No intracranial hemorrhage, mass effect or large acute cortical infarct. Focal old left frontal and temporal encephalomalacia.  Old bilateral lacunar infarcts.  CT Angio Chest: Marked interval improvement in multifocal pneumonia.  Emphysema. Compared to the recent CT of January 5, 2021.  CT Abdomen and Pelvis: Interval development of colitis of the sigmoid colon and rectum, of infectious or inflammatory etiology. Redemonstration of gallstones.  EKG: afib, HR: 105, no ST elevation or depression   Received Tylenol, Zosyn, NS 1L bolus x1 in the ED   86yo F, with PMH afib (on Coumadin), CVA and h/o aneurysm repair, infrarenal AAA, HTN, HLD, mitral valve prolapse, PVD of RLE s/p stents x3, rheumatoid arthritis, s/p hysterectomy, and L cataract, BIBEMS from [facility] for hypoxia, fever and lethargy. Pt was found to be satting 87% on RA and temperature of 101 F as per facility documentation. Pt tested positive for COVID on 1/12/21. Pt was lethargic and history is unable to be obtained.  After a few hours when seen, pt was more alert and complaining of diffuse abdominal pain.   Of note, pt was recently admitted to [place] (1/5/21-1/8/21) for multifocal PNA and was discharged on antibiotics and supplemental oxygen.   History was obtained from daughter Dixie.   As per daughter, pt     ED Course:   Vitals: BP: 137/95, HR:103 , Temp: 97.6 F, RR: 16, SpO2: 100% on NRB -->100% on 3L NC, desaturates to 90% on RA   Labs: WBC: 11.27, H/H: 11.2/34.6, neut: 10.35, ddimer: 777, PT/INR: 19/1.66, lactate: 1.0, bicarb: 17, BUN/Cr: 41/1.70, Ca: 7.6, GFR: 27  COVID PCR POSITIVE   VBG: pH: 7.37, pCO2: 36, pO2: 65, HCO3: 21, SpO2: 90%    Imaging:   CXR: left sided infiltrate improved when compared to CXR from 1/5 - pending official read   CT Head: No significant interval change compared to the previous CT of January 5, 2021.No intracranial hemorrhage, mass effect or large acute cortical infarct. Focal old left frontal and temporal encephalomalacia.  Old bilateral lacunar infarcts.  CT Chest: Marked interval improvement in multifocal pneumonia.  Emphysema. Compared to the recent CT of January 5, 2021.  CT Abdomen and Pelvis: Interval development of colitis of the sigmoid colon and rectum, of infectious or inflammatory etiology. Redemonstration of gallstones.  EKG: afib, HR: 105, no ST elevation or depression   Received Tylenol, Zosyn, NS 1L bolus x1 in the ED

## 2021-01-16 NOTE — H&P ADULT - PROBLEM SELECTOR PLAN 7
Chronic-history of peripheral arterial disease s/p 2 stents placed in RLE  - CTA Abdomen/Pelvis on last admission with evidence of occluded R femoral bypass graft  - Daughter, Dixie, reports patient has known occlusion of femoral graft  - Outpatient follow up for an angiogram Chronic  - Takes atorvastatin 40mg qD and aspirin at home, continue

## 2021-01-16 NOTE — H&P ADULT - PROBLEM SELECTOR PLAN 4
Chronic    - Known h/o afib, was discharged on Coumadin 5 mg BID W/Sat and 2.5mg for the rest of the week; but pt was changed to Coumadin 4 mg QD, most recent INR on 1/12 was 1.3    - Takes diltiazem 240mg qD and metoprolol succinate 100mg qD at home, continue   - EKG: afib, HR: 105, no ST elevation or depression   - Most recent TTE on last admission-Low-normal left ventricular systolic function, estimated LVEF of 50%. Mild AI. Moderate MR. Mild TR.    - Monitor daily INR and dose Coumadin accordingly likely metabolic encepahlopathy in the setting of infection.  Improved, no longer lethargic, more awake and alert  most likely 2/2 covid infection/colitis infection    H/o CVA and aneurysm repair in the past, no residual weakness  - CT Head: No significant interval change compared to the previous CT of January 5, 2021.No intracranial hemorrhage, mass effect or large acute cortical infarct. Focal old left frontal and temporal encephalomalacia. Old bilateral lacunar infarcts.  - Passed bedside dysphagia screen  - Continue home ASA and Lipitor.

## 2021-01-16 NOTE — PATIENT PROFILE ADULT - DEAF OR HARD OF HEARING?
Review of Systems/Medical History  Patient summary reviewed  Chart reviewed  No history of anesthetic complications     Cardiovascular  EKG reviewed, Exercise tolerance (METS): good,  Hyperlipidemia, Hypertension well controlled, CAD , Cardiac stents (cardiac catheterization in 2012 with stent placed)  Dysrhythmias (RBBB, aflutter) , atrial flutter, CHF ,   Comment: ECHO 1/25/19   LEFT VENTRICLE:  Systolic function was normal by visual assessment  Ejection fraction was estimated to be 55 %  There were no regional wall motion abnormalities  There was mild concentric hypertrophy      LEFT ATRIUM:  The atrium was massively dilated      MITRAL VALVE:  There was moderate annular calcification  There was moderate regurgitation      AORTIC VALVE:  The valve was trileaflet  Leaflets exhibited normal thickness, calcification, and mildly reduced cuspal separation  Transaortic velocity was increased due to valvular stenosis  There was mild stenosis  There was no regurgitation      TRICUSPID VALVE:  There was moderate regurgitation  Pulmonary artery systolic pressure was mildly increased  ,  Pulmonary  Negative pulmonary ROS        GI/Hepatic    GERD well controlled,        Negative  ROS        Endo/Other  History of thyroid disease , hypothyroidism,   Obesity (BMI 36)    GYN       Hematology  Negative hematology ROS      Musculoskeletal  Negative musculoskeletal ROS        Neurology      Comment: Deaf, sign language Psychology   Negative psychology ROS              Physical Exam    Airway    Mallampati score: II  TM Distance: >3 FB  Neck ROM: full     Dental   No notable dental hx     Cardiovascular  Rhythm: irregular,     Pulmonary  Breath sounds clear to auscultation,     Other Findings        Anesthesia Plan  ASA Score- 3     Anesthesia Type- IV sedation with anesthesia with ASA Monitors  Additional Monitors:   Airway Plan:     Comment: Consent obtained by patient through        Plan Factors-    Induction- intravenous  Postoperative Plan-     Informed Consent- Anesthetic plan and risks discussed with patient  I personally reviewed this patient with the CRNA  Discussed and agreed on the Anesthesia Plan with the CRNA  Sherre Soulier no

## 2021-01-16 NOTE — H&P ADULT - PROBLEM SELECTOR PLAN 3
Improved, no longer lethargic, more awake and alert  most likely 2/2 covid infection/colitis infection    H/o CVA and aneurysm repair in the past, no residual weakness  - CT Head: No significant interval change compared to the previous CT of January 5, 2021.No intracranial hemorrhage, mass effect or large acute cortical infarct. Focal old left frontal and temporal encephalomalacia. Old bilateral lacunar infarcts.  - Bedside dysphagia screen  - Continue home ASA and Lipitor. Improved, no longer lethargic, more awake and alert  most likely 2/2 covid infection/colitis infection    H/o CVA and aneurysm repair in the past, no residual weakness  - CT Head: No significant interval change compared to the previous CT of January 5, 2021.No intracranial hemorrhage, mass effect or large acute cortical infarct. Focal old left frontal and temporal encephalomalacia. Old bilateral lacunar infarcts.  - Passed bedside dysphagia screen  - Continue home ASA and Lipitor. - fredi on CKD stage 3   - likely 2/2 poor po intake in the setting of infection , covid/colitis  - s/p 1L NS bolus. continune with gentle fluids x 12 hours. avoid aggressive resuscitation given covid +  f/u repeat bmp  - hold nephrotoxic meds

## 2021-01-16 NOTE — CONSULT NOTE ADULT - SUBJECTIVE AND OBJECTIVE BOX
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    Osteoarthritis    Afib    PVD (peripheral vascular disease)    Neuropathy    Gout    H/O cerebral aneurysm repair    CVA (cerebral vascular accident)    Rheumatoid arthritis    Asthma    Hyperlipidemia    Hypertension    S/P cataract surgery    Elective surgery      S/P ORIF (open reduction internal fixation) fracture    H/O cerebral aneurysm repair    Renal stone    PVD (peripheral vascular disease)    S/P FRED (total abdominal hysterectomy)       HPI:  84yo F, with PMH afib (on Coumadin), CVA and h/o aneurysm repair, infrarenal AAA, HTN, HLD, mitral valve prolapse, PVD of RLE s/p stents x3, rheumatoid arthritis, s/p hysterectomy, and L cataract, BIBEMS from [facility] for hypoxia, fever and lethargy. Pt was found to be satting 87% on RA and temperature of 101 F as per facility documentation. Pt tested positive for COVID on 1/12/21. Pt was lethargic and history is unable to be obtained.  After a few hours when seen, pt was more alert and complaining of diffuse abdominal pain.  Of note, pt was recently admitted to [facility] (1/5/21-1/8/21) for multifocal PNA and was discharged on antibiotics and supplemental oxygen. gi consulted for abd pain, hx of afib on ac, pvd, ra, found to have + covid and colitis on ct. hida from prior admission on 1/6 reviewed, along w surgery eval. prior chart review- seen by gi inpt 1/2020 for epigastric pain thought to be 2/2 chronic mesenteric ischemia, managed conservatively. prior gi hx:  6/2019 sp egd- findings of candida esophagitis, gastritis and bile reflux; no stigmata of recent bleeding; prior episodes of biliary sepsis, sp ercp. last colon 2014 poor prep, findings of colon polyp, tortuous sigmoid, diverticulosis and hemorrhoids. per rn no c/o abd pain thus far this shift,  no noted diarrhea.  recent vs/labs/imaging reviewed- had low grade fever in ed; currently avss on nc  wbc 11k  inr 1.66  lactate 1  cr 1.7  lfts wnl  c19+  nc ctap as below          No Known Allergies      acetaminophen   Tablet .. 650 milliGRAM(s) Oral every 6 hours PRN  ascorbic acid 500 milliGRAM(s) Oral daily  aspirin enteric coated 81 milliGRAM(s) Oral daily  atorvastatin 40 milliGRAM(s) Oral at bedtime  dexAMETHasone  Injectable 6 milliGRAM(s) IV Push daily  diltiazem    milliGRAM(s) Oral daily  gabapentin 100 milliGRAM(s) Oral three times a day  metoprolol succinate  milliGRAM(s) Oral daily  multivitamin 1 Tablet(s) Oral daily  pantoprazole    Tablet 40 milliGRAM(s) Oral every 12 hours  piperacillin/tazobactam IVPB.. 3.375 Gram(s) IV Intermittent every 8 hours  sodium chloride 0.9%. 1000 milliLiter(s) IV Continuous <Continuous>        FAMILY HISTORY:  No pertinent family history in first degree relatives          Review of Systems:    tele consult    Relevant Family History:   n/c    Relevant Social History: n/c      Physical Exam:    Vital Signs:  Vital Signs Last 24 Hrs  T(C): 36.8 (16 Jan 2021 06:48), Max: 38 (15 Luis M 2021 23:26)  T(F): 98.2 (16 Jan 2021 06:48), Max: 100.4 (15 Luis M 2021 23:26)  HR: 99 (16 Jan 2021 06:48) (99 - 110)  BP: 106/71 (16 Jan 2021 06:48) (98/69 - 137/95)  BP(mean): --  RR: 18 (16 Jan 2021 06:48) (16 - 18)  SpO2: 97% (16 Jan 2021 06:48) (97% - 100%)  Daily Height in cm: 154.94 (15 Luis M 2021 21:24)    Daily     tele consult    Laboratory:                            11.2   11.27 )-----------( 189      ( 16 Jan 2021 00:03 )             34.6     01-16    138  |  111<H>  |  41<H>  ----------------------------<  128<H>  3.7   |  17<L>  |  1.70<H>    Ca    7.6<L>      16 Jan 2021 00:03    TPro  6.5  /  Alb  2.4<L>  /  TBili  0.5  /  DBili  x   /  AST  33  /  ALT  20  /  AlkPhos  120  01-16    LIVER FUNCTIONS - ( 16 Jan 2021 00:03 )  Alb: 2.4 g/dL / Pro: 6.5 g/dL / ALK PHOS: 120 U/L / ALT: 20 U/L / AST: 33 U/L / GGT: x           PT/INR - ( 16 Jan 2021 00:03 )   PT: 19.0 sec;   INR: 1.66 ratio         PTT - ( 16 Jan 2021 00:03 )  PTT:29.7 sec      Imaging:    < from: CT Abdomen and Pelvis No Cont (01.16.21 @ 03:03) >    EXAM:  CT ABDOMEN AND PELVIS                          EXAM:  CT CHEST                            PROCEDURE DATE:  01/16/2021          INTERPRETATION:  CT of the chest, abdomen and pelvis    Indication: [Hypoxia], unresponsive, fever    Technique: Axial images were obtained from the thoracic inlet through pubic symphysis without oral or IV contrast. Reformatted coronal and sagittal images were submitted.    Comparison: CT of 1/5/21    FINDINGS:    Evaluation of the solid abdominal organs is limited without contrast.    A 1.2 cm left thyroid hypodensity.  The visualized axilla and subcutaneous tissues are unremarkable.    The tracheobronchial tree is patent centrally.  There is no mediastinal hematoma.  The great vessels are not enlarged.  There are nonspecific mediastinal lymph nodes are unchanged, measuring 1.3 x 1.5 cm in the left paratracheal region and is stable and 1.1 x 0.8 cm in the right paratracheal region.  Atherosclerotic calcifications of the thoracic aorta and coronary arteries.    The heart is not enlarged.  There is no pericardial effusion.    Lungs: Mild to moderate centrilobular emphysema.  Previously visualized right middle and lower lobe and left upper lobe opacities are significantly decreased compared to the previous study of January 5, 2021. There are mild patchy opacities at the lung bases.    Pleura: Trace right pleural effusion.  There is no free air or focal collection.  No free fluid.    Liver: Normal.  Spleen: Normal.  Gallbladder: Moderately distendedwith gallstones.  Biliary tree: Unremarkable.  Adrenal glands: Normal.  Pancreas: Normal.  Kidneys: No hydronephrosis or obstructing stones. Nonobstructive right renal stones measure up to 7 mm.    Bowel:  There is no small bowel obstruction.  There is moderate thickening of the sigmoid colon and rectum compatible with colitis. There is surrounding inflammation without free fluid.    Bladder: Decompressed.  Pelvic organs: No pelvic masses.    There is no significant adenopathy.  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 Moderate atherosclerotic vascular calcification. Right external iliac bypass graft.    Retroperitoneum: There is no mass.  Bones: The bones are diffusely osteopenic. The patient is status post ORIF involving the bilateral proximal femurs  Subcutaneous tissues: Unremarkable.    IMPRESSION:    Marked interval improvement in multifocal pneumonia.  Emphysema.    Compared to the recent CT of January 5, 2021, interval development of colitis of the sigmoid colon and rectum, of infectious or inflammatory etiology.    Redemonstration of gallstones.            ALISIA BERNARD MD; Attending Radiologist  This document has been electronically signed. Jan 16 2021  3:44AM    < end of copied text >

## 2021-01-16 NOTE — CONSULT NOTE ADULT - SUBJECTIVE AND OBJECTIVE BOX
Date/Time Patient Seen:  		  Referring MD:   Data Reviewed	       Patient is a 85y old  Female who presents with a chief complaint of AMS, lethargy (16 Jan 2021 09:21)      Subjective/HPI  84yo F, with PMH afib (on Coumadin), CVA and h/o aneurysm repair, infrarenal AAA, HTN, HLD, mitral valve prolapse, PVD of RLE s/p stents x3, rheumatoid arthritis, s/p hysterectomy, and L cataract, BIBEMS from [facility] for hypoxia, fever and lethargy. Pt was found to be satting 87% on RA and temperature of 101 F as per facility documentation. Pt tested positive for COVID on 1/12/21. Pt was lethargic and history is unable to be obtained.  After a few hours when seen, pt was more alert and complaining of diffuse abdominal pain.   Of note, pt was recently admitted to [facility] (1/5/21-1/8/21) for multifocal PNA and was discharged on antibiotics and supplemental oxygen.   History was obtained from daughter Dixie.   As per daughter,    HOSPITAL COURSE:   Patient admitted for multifocal pneumonia shown on CT and possible cholecystitis. HIDA scan was negative for acute cholecystitis. Additionally, cardio consulted for presenting with afib in RVR controlled with IV Lopressor and Cardizem drip. ID consulted who recommend Zosyn and Azithro to cover multifocal pneumonia and acute cholecystitis. Incidental findings of right femoral bypass graft occlusions and known AAA measured at 2.6 x 2.8 cm on CT  were review by Vascular surgery which can both be followed up outpatient. Patient weaned off supplemental oxygen and advanced to regular diet. Patient seen and examined on day of discharge and is clinically optimized for discharge.       FAMILY HISTORY:  No pertinent family history in first degree relatives.     No Pertinent Family History in first degree relatives of: .     Social History:  Social History (marital status, living situation, occupation, tobacco use, alcohol and drug use, and sexual history): Tobacco: Denies  EtOH: Denies   Recreational drug use: Denies  Lives with: Alone at home with visits from [facility], recently discharged to rehab   Ambulates: With walker   Vaccinations: Got flu and PNA vaccine     Tobacco Screening:  · Core Measure Site	Yes  · Has the patient used tobacco in the past 30 days?	No    Risk Assessment:    Present on Admission:  Deep Venous Thrombosis	no  Pulmonary Embolus	no     Heart Failure:  Does this patient have a history of or has been diagnosed with heart failure? yes.     LV Function Assessment (LVS function was evaluated before arrival and/or during hospitalization) no.  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    Osteoarthritis    Afib    PVD (peripheral vascular disease)    Neuropathy  (Right lower leg)    Gout    H/O cerebral aneurysm repair  brain clips    CVA (cerebral vascular accident)  (&quot;Mini-stroke&quot;,1990&#x27;s)    Rheumatoid arthritis    Asthma    Hyperlipidemia    Hypertension    S/P cataract surgery  (Left eye)    Elective surgery  (&quot;Twisted bowel&quot;, 2014)    Elective surgery  (Exision of cyst on liver, 1985)    S/P ORIF (open reduction internal fixation) fracture  Left hip fx (2012) &amp; R hip fx (2013)    H/O cerebral aneurysm repair  Brain clips (1978(    Renal stone  Cysto stent placement 10/1/2014    PVD (peripheral vascular disease)  s/p RLE bypass x 3, most recent 3/2012 Right external iliac to PT bypass w/ PTFE (2012)    S/P FRED (total abdominal hysterectomy)  (1987, Hx of &quot;ovarian cancer?&quot;)          Medication list         MEDICATIONS  (STANDING):  ascorbic acid 500 milliGRAM(s) Oral daily  aspirin enteric coated 81 milliGRAM(s) Oral daily  atorvastatin 40 milliGRAM(s) Oral at bedtime  dexAMETHasone  Injectable 6 milliGRAM(s) IV Push daily  diltiazem    milliGRAM(s) Oral daily  gabapentin 100 milliGRAM(s) Oral three times a day  lactobacillus acidophilus 1 Tablet(s) Oral three times a day  metoprolol succinate  milliGRAM(s) Oral daily  multivitamin 1 Tablet(s) Oral daily  pantoprazole    Tablet 40 milliGRAM(s) Oral every 12 hours  piperacillin/tazobactam IVPB.. 3.375 Gram(s) IV Intermittent every 8 hours  sodium chloride 0.9%. 1000 milliLiter(s) (60 mL/Hr) IV Continuous <Continuous>    MEDICATIONS  (PRN):  acetaminophen   Tablet .. 650 milliGRAM(s) Oral every 6 hours PRN Temp greater or equal to 38C (100.4F), Mild Pain (1 - 3)         Vitals log        ICU Vital Signs Last 24 Hrs  T(C): 36.8 (16 Jan 2021 06:48), Max: 38 (15 Luis M 2021 23:26)  T(F): 98.2 (16 Jan 2021 06:48), Max: 100.4 (15 Luis M 2021 23:26)  HR: 99 (16 Jan 2021 06:48) (99 - 110)  BP: 106/71 (16 Jan 2021 06:48) (98/69 - 137/95)  BP(mean): --  ABP: --  ABP(mean): --  RR: 18 (16 Jan 2021 06:48) (16 - 18)  SpO2: 97% (16 Jan 2021 06:48) (97% - 100%)           Input and Output:  I&O's Detail    16 Jan 2021 07:01  -  16 Jan 2021 10:16  --------------------------------------------------------  IN:    sodium chloride 0.9%: 1000 mL  Total IN: 1000 mL    OUT:  Total OUT: 0 mL    Total NET: 1000 mL          Lab Data                        10.1   10.78 )-----------( 213      ( 16 Jan 2021 09:34 )             32.1     01-16    142  |  112<H>  |  44<H>  ----------------------------<  111<H>  3.5   |  20<L>  |  1.90<H>    Ca    7.7<L>      16 Jan 2021 09:34    TPro  6.2  /  Alb  2.3<L>  /  TBili  0.5  /  DBili  x   /  AST  31  /  ALT  17  /  AlkPhos  103  01-16            Review of Systems	  weakness      Objective     Physical Examination    heart s1s2  lung dc BS  abd soft      Pertinent Lab findings & Imaging      Fransico:  NO   Adequate UO     I&O's Detail    16 Jan 2021 07:01  -  16 Jan 2021 10:16  --------------------------------------------------------  IN:    sodium chloride 0.9%: 1000 mL  Total IN: 1000 mL    OUT:  Total OUT: 0 mL    Total NET: 1000 mL               Discussed with:     Cultures:	        Radiology    EXAM:  CT ABDOMEN AND PELVIS                          EXAM:  CT CHEST                            PROCEDURE DATE:  01/16/2021          INTERPRETATION:  CT of the chest, abdomen and pelvis    Indication: [Hypoxia], unresponsive, fever    Technique: Axial images were obtained from the thoracic inlet through pubic symphysis without oral or IV contrast. Reformatted coronal and sagittal images were submitted.    Comparison: CT of 1/5/21    FINDINGS:    Evaluation of the solid abdominal organs is limited without contrast.    A 1.2 cm left thyroid hypodensity.  The visualized axilla and subcutaneous tissues are unremarkable.    The tracheobronchial tree is patent centrally.  There is no mediastinal hematoma.  The great vessels are not enlarged.  There are nonspecific mediastinal lymph nodes are unchanged, measuring 1.3 x 1.5 cm in the left paratracheal region and is stable and 1.1 x 0.8 cm in the right paratracheal region.  Atherosclerotic calcifications of the thoracic aorta and coronary arteries.    The heart is not enlarged.  There is no pericardial effusion.    Lungs: Mild to moderate centrilobular emphysema.  Previously visualized right middle and lower lobe and left upper lobe opacities are significantly decreased compared to the previous study of January 5, 2021. There are mild patchy opacities at the lung bases.    Pleura: Trace right pleural effusion.  There is no free air or focal collection.  No free fluid.    Liver: Normal.  Spleen: Normal.  Gallbladder: Moderately distended with gallstones.  Biliary tree: Unremarkable.  Adrenal glands: Normal.  Pancreas: Normal.  Kidneys: No hydronephrosis or obstructing stones. Nonobstructive right renal stones measure up to 7 mm.    Bowel:  There is no small bowel obstruction.  There is moderate thickening of the sigmoid colon and rectum compatible with colitis. There is surrounding inflammation without free fluid.    Bladder: Decompressed.  Pelvic organs: No pelvic masses.    There is no significant adenopathy.  Vasculature: Left lateral saccular aneurysm of the abdominal aorta. Moderate atherosclerotic vascular calcification. Right external iliac bypass graft.    Retroperitoneum: There is no mass.  Bones: The bones are diffusely osteopenic. The patient is status post ORIF involving the bilateral proximal femurs  Subcutaneous tissues: Unremarkable.    IMPRESSION:    Marked interval improvement in multifocal pneumonia.  Emphysema.    Compared to the recent CT of January 5, 2021, interval development of colitis of the sigmoid colon and rectum, of infectious or inflammatory etiology.    Redemonstration of gallstones.            ALISIA BERNARD MD; Attending Radiologist  This document has been electronically signed. Jan 16 2021  3:44AM

## 2021-01-16 NOTE — CONSULT NOTE ADULT - ASSESSMENT
1. Sigmoid colitis   2. COVID 19   3. Fever  4. Leukocytosis  5. BRIDGET      - continue Zosyn IV q 8 hours  - awaiting for blood cultures  - monitor pt's temperatures  - monitor WBC  - monitor pt's renal function  - supplemental O2 for O2 SAT >92%  - continue Decadron x 10 days  - off remdesivir - pt's renal function is impaired  - precautions as per protocol  - anticoagulation as per protocol  - trend inflammatory markers       full note to follow  86yo female, with PMH afib (on Coumadin), CVA and h/o aneurysm repair, infrarenal AAA, HTN, HLD, mitral valve prolapse, PVD of RLE s/p stents x3, rheumatoid arthritis, s/p hysterectomy, and L cataract, BIBEMS from [facility] for hypoxia, fever and lethargy. Pt was found to be satting 87% on RA and temperature of 101 F as per facility documentation. Pt tested positive for COVID on 1/12/21. The pt is s/p recent admission to the [facility] 1/5-1/8, was treated for PNA - received Zithromax x 3 days, completed a course of IV Zosyn, discharged home on Augmentin x 7 days. The had a low grade fever of 100.4 yesterday night, on supplemental O2 via NC, lethargic, WBC 11.27 on admission, decreased slightly now, + BRIDGET, with normal lactate, + elevated D-dimer 777, with normal liver enzymes, COVID 19 test is positive. CT head - no change from the study on the previous recent admission.   On admission the pt complained of diffuse abdominal pain, CT chest/abd/pelvis without contrast was performed - marked improvement in multifocal pneumonia, + emphysema, + colititis of sigmoid colon and rectum of inflammatory or infectious etiology, + gallstones that were seen on previous admission, HIDA scan was done then and negative. As per RN, the pt did not have any bowel movements overnight or in the morning.  On exam, the pt is lethargic, on supplemental O2 via NC, able to answer some questions appropriately but drifts into sleep in the middle of the sentence, pt's abdomen is diffusely tender upon palpation especially in the left lower quadrant. The pt was started on antimicrobial therapy on admission with Zosyn IV, remdesivir held secondary to BRIDGET, pt is on Decadron. +    The pt is admitted with COVID 19 and abdominal pain, colitis, BRIDGET.         1. Sigmoid colitis   2. COVID 19   3. Fever  4. Leukocytosis  5. BRIDGET      - discontinue Zosyn IV q 8 hours  - start cefepime - renally dosed, ordered  - start Flagyl 500 mg IV q 8 horus  - if pt will start having diarrhea, sent stool sample for C. Diff and start on oral Vancomycin  - agree with keeping the pt off remdesivir, pt's GFR 24  - continue Decadron x 10 days   - awaiting for blood cultures  - monitor pt's temperatures  - monitor WBC  - monitor pt's renal function  - supplemental O2 for O2 SAT >92%  - precautions as per protocol  - anticoagulation as per protocol  - trend inflammatory markers   - pain control   - palliative care consult  - GI and nephrology evaluations  84yo female, with PMH afib (on Coumadin), CVA and h/o aneurysm repair, infrarenal AAA, HTN, HLD, mitral valve prolapse, PVD of RLE s/p stents x3, rheumatoid arthritis, s/p hysterectomy, and L cataract, BIBEMS from [facility] for hypoxia, fever and lethargy. Pt was found to be satting 87% on RA and temperature of 101 F as per facility documentation. Pt tested positive for COVID on 1/12/21. The pt is s/p recent admission to the [facility] 1/5-1/8, was treated for PNA - received Zithromax x 3 days, completed a course of IV Zosyn, discharged home on Augmentin x 7 days. The had a low grade fever of 100.4 yesterday night, on supplemental O2 via NC, lethargic, WBC 11.27 on admission, decreased slightly now, + BRIDGET, with normal lactate, + elevated D-dimer 777, with normal liver enzymes, COVID 19 test is positive. CT head - no change from the study on the previous recent admission.   On admission the pt complained of diffuse abdominal pain, CT chest/abd/pelvis without contrast was performed - marked improvement in multifocal pneumonia, + emphysema, + colititis of sigmoid colon and rectum of inflammatory or infectious etiology, + gallstones that were seen on previous admission, HIDA scan was done then and negative. As per RN, the pt did not have any bowel movements overnight or in the morning.  On exam, the pt is lethargic, on supplemental O2 via NC, able to answer some questions appropriately but drifts into sleep in the middle of the sentence, pt's abdomen is diffusely tender upon palpation especially in the left lower quadrant. The pt was started on antimicrobial therapy on admission with Zosyn IV, remdesivir held secondary to BRIDGET, pt is on Decadron. +    The pt is admitted with COVID 19 and abdominal pain, colitis, BRIDGET.     Attending Addendum--  Case reviewed with NAJMA Brush. Her note reviewed and modified as appropriate.   Patient personally assessed and examined.  Seen this pm.   Patient complains of heel pain, exam without local inflammatory signs, warm/well perfused appearing. Postop changes. Does not appear to have limb ischemia. Chest exam with diminished breath sounds, SaO2 NC 93%. . Abdominal exam with significant tenderness LLQ > suprapubic area >> elsewhere. No rebound. + guarding. Deforming arthritis c/w known RA.  Patient denies diarrhea prior to admission or here in the hospital, states passed flatus. At risk for resistant daniele given recent zosyn, antibiotics altered. Need to remain vigilant for C. difficile. Patient w RA, no DMARDS/biologics known to place patient at increased risk for opportunistic pathogens. Patient with prior hx of mesenteric ischemia but pain not out of proportion to physical findings, rectum unsual location to have ischemia given duplicate blood supply. COVID certainly potentially associated with hyperocoagulable states. Merits decadron but will suppress abdominal exam. Remdesivir not shown to impact mortality, in setting of BRIDGET potentially harmful, would avoid.       1. Sigmoid colitis   2. COVID 19   3. Fever  4. Leukocytosis  5. BRIDGET      - discontinue Zosyn IV q 8 hours  - start cefepime - renally dosed, ordered  - start Flagyl 500 mg IV q 8 horus  - if pt will start having diarrhea, sent stool sample for C. Diff and start on oral Vancomycin  - agree with keeping the pt off remdesivir, pt's GFR 24  - continue Decadron x 10 days   - awaiting for blood cultures  - monitor pt's temperatures  - monitor WBC  - monitor pt's renal function  - supplemental O2 for O2 SAT >92%  - precautions as per protocol  - anticoagulation as per protocol  - trend inflammatory markers   - pain control   - palliative care consult  - GI and nephrology evaluations     Jovan Will MD  Attending Physician  [facility]  Division of Infectious Diseases  [phone]

## 2021-01-16 NOTE — CONSULT NOTE ADULT - SUBJECTIVE AND OBJECTIVE BOX
Patient is a 85y old  Female who presents with a chief complaint of AMS, lethargy (16 Jan 2021 10:12)      HPI:  86yo F, with PMH afib (on Coumadin), CVA and h/o aneurysm repair, infrarenal AAA, HTN, HLD, mitral valve prolapse, PVD of RLE s/p stents x3, rheumatoid arthritis, s/p hysterectomy, and L cataract, BIBEMS from [facility] for hypoxia, fever and lethargy. Pt was found to be satting 87% on RA and temperature of 101 F as per facility documentation. Pt tested positive for COVID on 1/12/21. Pt was lethargic and history is unable to be obtained.  After a few hours when seen, pt was more alert and complaining of diffuse abdominal pain.   Of note, pt was recently admitted to [facility] (1/5/21-1/8/21) for multifocal PNA and was discharged on antibiotics and supplemental oxygen.   History was obtained from daughter Dixie.   As per daughter, pt     ED Course:   Vitals: BP: 137/95, HR:103 , Temp: 97.6 F, RR: 16, SpO2: 100% on NRB -->100% on 3L NC, desaturates to 90% on RA   Labs: WBC: 11.27, H/H: 11.2/34.6, neut: 10.35, ddimer: 777, PT/INR: 19/1.66, lactate: 1.0, bicarb: 17, BUN/Cr: 41/1.70, Ca: 7.6, GFR: 27  COVID PCR POSITIVE   VBG: pH: 7.37, pCO2: 36, pO2: 65, HCO3: 21, SpO2: 90%    Imaging:   CXR: left sided infiltrate improved when compared to CXR from 1/5 - pending official read   CT Head: No significant interval change compared to the previous CT of January 5, 2021.No intracranial hemorrhage, mass effect or large acute cortical infarct. Focal old left frontal and temporal encephalomalacia.  Old bilateral lacunar infarcts.  CT Chest: Marked interval improvement in multifocal pneumonia.  Emphysema. Compared to the recent CT of January 5, 2021.  CT Abdomen and Pelvis: Interval development of colitis of the sigmoid colon and rectum, of infectious or inflammatory etiology. Redemonstration of gallstones.  EKG: afib, HR: 105, no ST elevation or depression   Received Tylenol, Zosyn, NS 1L bolus x1 in the ED   (16 Jan 2021 04:50)      ID consulted for workup and antibiotic management     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    Osteoarthritis    Afib    PVD (peripheral vascular disease)    Neuropathy  (Right lower leg)    H/O cerebral aneurysm repair  brain clips    CVA (cerebral vascular accident)  (&quot;Mini-stroke&quot;,1990&#x27;s)    Rheumatoid arthritis    Hyperlipidemia    Hypertension    S/P cataract surgery  (Left eye)    Elective surgery  (&quot;Twisted bowel&quot;, 2014)    Elective surgery  (Exision of cyst on liver, 1985)    S/P ORIF (open reduction internal fixation) fracture  Left hip fx (2012) &amp; R hip fx (2013)    H/O cerebral aneurysm repair  Brain clips (1978(    Renal stone  Cysto stent placement 10/1/2014    PVD (peripheral vascular disease)  s/p RLE bypass x 3, most recent 3/2012 Right external iliac to PT bypass w/ PTFE (2012)    S/P FRED (total abdominal hysterectomy)  (1987, Hx of &quot;ovarian cancer?&quot;)        Allergies  No Known Allergies        ANTIMICROBIALS:  piperacillin/tazobactam IVPB.. 3.375 every 8 hours      MEDICATIONS  (STANDING):    piperacillin/tazobactam IVPB...   200 mL/Hr IV Intermittent (01-16-21 @ 05:27)        OTHER MEDS: MEDICATIONS  (STANDING):  acetaminophen   Tablet .. 650 every 6 hours PRN  ALBUTerol    90 MICROgram(s) HFA Inhaler 2 every 3 hours PRN  aspirin enteric coated 81 daily  atorvastatin 40 at bedtime  budesonide  80 MICROgram(s)/formoterol 4.5 MICROgram(s) Inhaler 2 two times a day  dexAMETHasone  Injectable 6 daily  diltiazem    daily  gabapentin 100 three times a day  guaiFENesin   Syrup  (Sugar-Free) 200 every 6 hours PRN  metoprolol succinate  daily  pantoprazole    Tablet 40 every 12 hours  tiotropium 18 MICROgram(s) Capsule 1 daily      SOCIAL HISTORY:       FAMILY HISTORY:  No pertinent family history in first degree relatives        REVIEW OF SYSTEMS  [  ] ROS unobtainable because:    [  ] All other systems negative except as noted below:	    Constitutional:  [ ] fever [ ] chills  [ ] weight loss  [ ] weakness  Skin:  [ ] rash [ ] phlebitis	  Eyes: [ ] icterus [ ] pain  [ ] discharge	  ENMT: [ ] sore throat  [ ] thrush [ ] ulcers [ ] exudates  Respiratory: [ ] dyspnea [ ] hemoptysis [ ] cough [ ] sputum	  Cardiovascular:  [ ] chest pain [ ] palpitations [ ] edema	  Gastrointestinal:  [ ] nausea [ ] vomiting [ ] diarrhea [ ] constipation [ ] pain	  Genitourinary:  [ ] dysuria [ ] frequency [ ] hematuria [ ] discharge [ ] flank pain  [ ] incontinence  Musculoskeletal:  [ ] myalgias [ ] arthralgias [ ] arthritis  [ ] back pain  Neurological:  [ ] headache [ ] seizures  [ ] confusion/altered mental status  Psychiatric:  [ ] anxiety [ ] depression	  Hematology/Lymphatics:  [ ] lymphadenopathy  Endocrine:  [ ] adrenal [ ] thyroid  Allergic/Immunologic:	 [ ] transplant [ ] seasonal    Vital Signs Last 24 Hrs  T(F): 98.2 (01-16-21 @ 06:48), Max: 100.4 (01-15-21 @ 23:26)    Vital Signs Last 24 Hrs  HR: 99 (01-16-21 @ 06:48) (99 - 110)  BP: 106/71 (01-16-21 @ 06:48) (98/69 - 137/95)  RR: 18 (01-16-21 @ 06:48)  SpO2: 97% (01-16-21 @ 06:48) (97% - 100%)  Wt(kg): --    PHYSICAL EXAM:  Constitutional: non-toxic, no distress  HEAD/EYES: anicteric, no conjunctival injection  ENT:  supple, no thrush  Cardiovascular:   normal S1, S2, no murmur, no edema  Respiratory:  clear BS bilaterally, no wheezes, no rales  GI:  soft, non-tender, normal bowel sounds  :  no chaudhary, no CVA tenderness  Musculoskeletal:  no synovitis, normal ROM  Neurologic: awake and alert, normal strength, no focal findings  Skin:  no rash, no erythema, no phlebitis  Heme/Onc: no lymphadenopathy   Psychiatric:  awake, alert, appropriate mood          WBC Count: 10.78 K/uL (01-16 @ 09:34)  WBC Count: 11.27 K/uL (01-16 @ 00:03)                            10.1   10.78 )-----------( 213      ( 16 Jan 2021 09:34 )             32.1       01-16    142  |  112<H>  |  44<H>  ----------------------------<  111<H>  3.5   |  20<L>  |  1.90<H>    Ca    7.7<L>      16 Jan 2021 09:34    TPro  6.2  /  Alb  2.3<L>  /  TBili  0.5  /  DBili  x   /  AST  31  /  ALT  17  /  AlkPhos  103  01-16      Creatinine Trend: 1.90<--, 1.70<--, 1.40<--, 1.20<--, 1.00<--, 1.30<--        MICROBIOLOGY:          v          COVID-19 PCR: Detected (01-16)  COVID-19 PCR: NotDetec (01-06)  COVID-19 PCR: NotDetec (01-05)          D-Dimer Assay, Quantitative: 777 (01-16)  D-Dimer Assay, Quantitative: 1004 (01-05)      RADIOLOGY:  CT Chest No Cont:  (16 Jan 2021 03:02)               Patient is a 85y old  Female who presents with a chief complaint of AMS, lethargy (16 Jan 2021 10:12)      HPI:  86yo female, with PMH afib (on Coumadin), CVA and h/o aneurysm repair, infrarenal AAA, HTN, HLD, mitral valve prolapse, PVD of RLE s/p stents x3, rheumatoid arthritis, s/p hysterectomy, and L cataract, BIBEMS from [facility] for hypoxia, fever and lethargy. Pt was found to be satting 87% on RA and temperature of 101 F as per facility documentation. Pt tested positive for COVID on 1/12/21. The pt is s/p recent admission to the [facility] 1/5-1/8, was treated for PNA - received Zithromax x 3 days, completed a course of IV Zosyn, discharged home on Augmentin x 7 days. The had a low grade fever of 100.4 yesterday night, on supplemental O2 via NC, lethargic, WBC 11.27 on admission, decreased slightly now, + BRIDGET, with normal lactate, + elevated D-dimer 777, with normal liver enzymes, COVID 19 test is positive.   CT head - no change from the study on the previous recent admission.   On admission the pt complained of diffuse abdominal pain, CT chest/abd/pelvis without contrast was performed - marked improvement in multifocal pneumonia, + emphysema, + colititis of sigmoid colon and rectum of inflammatory or infectious etiology, + gallstones that were seen on previous admission, HIDA scan was done then and negative. As per RN, the pt did not have any bowel movements overnight or in the morning.  On exam, the pt is lethargic, on supplemental O2 via NC, able to answer some questions appropriately but drifts into sleep in the middle of the sentence, pt's abdomen is diffusely tender upon palpation especially in the left lower quadrant. The pt was started on antimicrobial therapy on admission with Zosyn IV, remdesivir held secondary to BRIDGET, pt is on Decadron.     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    Osteoarthritis    Afib    PVD (peripheral vascular disease)    Neuropathy  (Right lower leg)    H/O cerebral aneurysm repair  brain clips    CVA (cerebral vascular accident)  (&quot;Mini-stroke&quot;,1990&#x27;s)    Rheumatoid arthritis    Hyperlipidemia    Hypertension    S/P cataract surgery  (Left eye)    Elective surgery  (&quot;Twisted bowel&quot;, 2014)    Elective surgery  (Exision of cyst on liver, 1985)    S/P ORIF (open reduction internal fixation) fracture  Left hip fx (2012) &amp; R hip fx (2013)    H/O cerebral aneurysm repair  Brain clips (1978)    Renal stone  Cysto stent placement 10/1/2014    PVD (peripheral vascular disease)  s/p RLE bypass x 3, most recent 3/2012 Right external iliac to PT bypass w/ PTFE (2012)    S/P FRED (total abdominal hysterectomy)  (1987, Hx of ovarian cancer)        Allergies  No Known Allergies        ANTIMICROBIALS:  piperacillin/tazobactam IVPB.. 3.375 every 8 hours      MEDICATIONS  (STANDING):    piperacillin/tazobactam IVPB...   200 mL/Hr IV Intermittent (01-16-21 @ 05:27)        OTHER MEDS: MEDICATIONS  (STANDING):  acetaminophen   Tablet .. 650 every 6 hours PRN  ALBUTerol    90 MICROgram(s) HFA Inhaler 2 every 3 hours PRN  aspirin enteric coated 81 daily  atorvastatin 40 at bedtime  budesonide  80 MICROgram(s)/formoterol 4.5 MICROgram(s) Inhaler 2 two times a day  dexAMETHasone  Injectable 6 daily  diltiazem    daily  gabapentin 100 three times a day  guaiFENesin   Syrup  (Sugar-Free) 200 every 6 hours PRN  metoprolol succinate  daily  pantoprazole    Tablet 40 every 12 hours  tiotropium 18 MICROgram(s) Capsule 1 daily      SOCIAL HISTORY:    Tobacco: Denies  EtOH: Denies   Recreational drug use: Denies  Lives with: Alone at home with visits from [facility], recently discharged to rehab   Ambulates: With walker   Vaccinations: Got flu and PNA vaccine    FAMILY HISTORY:  No pertinent family history in first degree relatives        REVIEW OF SYSTEMS- ROS is very limited, pt is lethargic, unable to answer - pt mostly complains of abdominal pain and being tired, denies having chest pain    [  ] ROS unobtainable because:    [  ] All other systems negative except as noted below:	    Constitutional:  [ ] fever [ ] chills  [ ] weight loss  [ ] weakness  Skin:  [ ] rash [ ] phlebitis	  Eyes: [ ] icterus [ ] pain  [ ] discharge	  ENMT: [ ] sore throat  [ ] thrush [ ] ulcers [ ] exudates  Respiratory: [ ] dyspnea [ ] hemoptysis [ ] cough [ ] sputum	  Cardiovascular:  [ ] chest pain [ ] palpitations [ ] edema	  Gastrointestinal:  [ ] nausea [ ] vomiting [ ] diarrhea [ ] constipation [ ] pain	  Genitourinary:  [ ] dysuria [ ] frequency [ ] hematuria [ ] discharge [ ] flank pain  [ ] incontinence  Musculoskeletal:  [ ] myalgias [ ] arthralgias [ ] arthritis  [ ] back pain  Neurological:  [ ] headache [ ] seizures  [ ] confusion/altered mental status  Psychiatric:  [ ] anxiety [ ] depression	  Hematology/Lymphatics:  [ ] lymphadenopathy  Endocrine:  [ ] adrenal [ ] thyroid  Allergic/Immunologic:	 [ ] transplant [ ] seasonal    Vital Signs Last 24 Hrs  T(F): 98.2 (01-16-21 @ 06:48), Max: 100.4 (01-15-21 @ 23:26)    Vital Signs Last 24 Hrs  HR: 99 (01-16-21 @ 06:48) (99 - 110)  BP: 106/71 (01-16-21 @ 06:48) (98/69 - 137/95)  RR: 18 (01-16-21 @ 06:48)  SpO2: 97% (01-16-21 @ 06:48) (97% - 100%)  Wt(kg): --    PHYSICAL EXAM:  Constitutional: lethargic female, on supplemental O2 via NC  HEAD/EYES: anicteric, no conjunctival injection  ENT:  supple, no thrush, no oral lesions   Cardiovascular:  irregular, no murmur, no edema  Respiratory:  fine scattered crackles bilaterally, no wheezes, no rales, no rhonchi   GI:  soft, + diffuse tenderness especially in LLQ, some in RLQ, less in upper quadrants, normal bowel sounds, no rebound tenderness   :  no chaudhary, no CVA tenderness  Musculoskeletal:  no joint swelling  Neurologic: pt is lethargic,  no focal findings  Skin:  no rash, no erythema, no phlebitis  Heme/Onc: no lymphadenopathy   Psychiatric:  pt is lethargic, answers some of my questions and appropriately           WBC Count: 10.78 K/uL (01-16 @ 09:34)  WBC Count: 11.27 K/uL (01-16 @ 00:03)                            10.1   10.78 )-----------( 213      ( 16 Jan 2021 09:34 )             32.1       01-16    142  |  112<H>  |  44<H>  ----------------------------<  111<H>  3.5   |  20<L>  |  1.90<H>    Ca    7.7<L>      16 Jan 2021 09:34    TPro  6.2  /  Alb  2.3<L>  /  TBili  0.5  /  DBili  x   /  AST  31  /  ALT  17  /  AlkPhos  103  01-16      Creatinine Trend: 1.90<--, 1.70<--, 1.40<--, 1.20<--, 1.00<--, 1.30<--        MICROBIOLOGY:  blood cultures are pending  MRSA/MSSA PCR (01.07.21 @ 08:20)    MRSA PCR Result.: NotDetec: MRSA/MSSA PCR assay is a qualitative in vitro diagnostic test for the  direct detection and differentiation of methicillin-resistant  Staphylococcus aureus (MRSA) from Staphylococcus aureus (SA). The assay  detects DNA from nasal swabs in patients atrisk for nasal colonization.  It is not intended to diagnose MRSA or SA infections nor guide or monitor  treatment for MRSA/SA infections. A negative result does not preclude  nasal colonization. The assay is FDA-approved and its performance has  been established by [employer], [employer] and the [place], [place].    Staph Aureus PCR Result: Detected    v          COVID-19 PCR: Detected (01-16)  COVID-19 PCR: NotDetec (01-06)  COVID-19 PCR: NotDetec (01-05)          D-Dimer Assay, Quantitative: 777 (01-16)  D-Dimer Assay, Quantitative: 1004 (01-05)      RADIOLOGY:  < from: CT Abdomen and Pelvis No Cont (01.16.21 @ 03:03) >  EXAM:  CT ABDOMEN AND PELVIS                          EXAM:  CT CHEST                            PROCEDURE DATE:  01/16/2021          INTERPRETATION:  CT of the chest, abdomen and pelvis    Indication: [Hypoxia], unresponsive, fever    Technique: Axial images were obtained from the thoracic inlet through pubic symphysis without oral or IV contrast. Reformatted coronal and sagittal images were submitted.    Comparison: CT of 1/5/21    FINDINGS:    Evaluation of the solid abdominal organs is limited without contrast.    A 1.2 cm left thyroid hypodensity.  The visualized axilla and subcutaneous tissues are unremarkable.    The tracheobronchial tree is patent centrally.  There is no mediastinal hematoma.  The great vessels are not enlarged.  There are nonspecific mediastinal lymph nodes are unchanged, measuring 1.3 x 1.5 cm in the left paratracheal region and is stable and 1.1 x 0.8 cm in the right paratracheal region.  Atherosclerotic calcifications of the thoracic aorta and coronary arteries.    The heart is not enlarged.  There is no pericardial effusion.    Lungs: Mild to moderate centrilobular emphysema.  Previously visualized right middle and lower lobe and left upper lobe opacities are significantly decreased compared to the previous study of January 5, 2021. There are mild patchy opacities at the lung bases.    Pleura: Trace right pleural effusion.  There is no free air or focal collection.  No free fluid.    Liver: Normal.  Spleen: Normal.  Gallbladder: Moderately distendedwith gallstones.  Biliary tree: Unremarkable.  Adrenal glands: Normal.  Pancreas: Normal.  Kidneys: No hydronephrosis or obstructing stones. Nonobstructive right renal stones measure up to 7 mm.    Bowel:  There is no small bowel obstruction.  There is moderate thickening of the sigmoid colon and rectum compatible with colitis. There is surrounding inflammation without free fluid.    Bladder: Decompressed.  Pelvic organs: No pelvic masses.    There is no significant adenopathy.  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 Moderate atherosclerotic vascular calcification. Right external iliac bypass graft.    Retroperitoneum: There is no mass.  Bones: The bones are diffusely osteopenic. The patient is status post ORIF involving the bilateral proximal femurs  Subcutaneous tissues: Unremarkable.    IMPRESSION:    Marked interval improvement in multifocal pneumonia.  Emphysema.    Compared to the recent CT of January 5, 2021, interval development of colitis of the sigmoid colon and rectum, of infectious or inflammatory etiology.    Redemonstration of gallstones.            ALISIA BERNARD MD; Attending Radiologist  This document has been electronically signed. Jan 16 2021  3:44AM    < end of copied text >    < from: Xray Chest 1 View-PORTABLE IMMEDIATE (01.16.21 @ 00:42) >    EXAM:  XR CHEST PORTABLE IMMED 1V                            PROCEDURE DATE:  01/16/2021          INTERPRETATION:  Portable chest radiograph    CLINICAL INFORMATION: Hypoxia    TECHNIQUE:  Portable  AP view of the chest was obtained.    COMPARISON:No previous examinations are available for review.    FINDINGS:  The lungs show asymmetric LEFT lingular segment ill-defined opacities diminished from 1/5/2021 likely indicating resolving pneumonia superimposed on mild diffuse interstitial lung disease.. No pneumothorax.    The heart and mediastinum are within normal limits.    Visualized osseous structures are intact.        IMPRESSION: Resolving  LEFT lingular segment diffuse airspace disease. Continued surveillance recommended.            VINNY ARAUJO MD; Attending Radiologist  This document has been electronically signed. Jan 16 2021 10:22A    < end of copied text >

## 2021-01-16 NOTE — H&P ADULT - PROBLEM SELECTOR PLAN 8
- Known h/o fusiform infrarenal AAA without significant change last evaluated during 1/2020 admission by vascular surgery with no acute intervention at that time  - Recommended outpatient follow up. Chronic-history of peripheral arterial disease s/p 2 stents placed in RLE  - CTA Abdomen/Pelvis on last admission with evidence of occluded R femoral bypass graft  - Daughter, Dixie, reports patient has known occlusion of femoral graft  - Outpatient follow up for an angiogram

## 2021-01-16 NOTE — CONSULT NOTE ADULT - PROBLEM SELECTOR RECOMMENDATION 9
84 yo F PMHx Afib (on Coumadin), CVA and h/o aneurysm repair, infrarenal AAA, HTN, HLD, mitral valve prolapse, PVD of RLE s/p stents x3, rheumatoid arthritis, s/p hysterectomy, and L cataract, recently d/ambreen (9 days ago) from [facility] for PNA BIBEMS from [facility] for evaluation of hypoxia 87% RA, temp 101 F  pt with Covid 19 illness - HFpEF - valvular heart disease and Moderate Emphysema  COPD - proventil PRN, Steroids, Spiriva - Symbicort Inhaler regimen, o2 support  Heart disease - proBNP elev - TTE reviewed - may need PRN diuresis and optimization of cvs rx regimen and BP control  Frailty - weakness - enc nturition - assist with needs and ADL  Pna eval - pt has viral pna, doubtful about Bacterial Pna - although its difficult to discern at present, ID eval pending, biomarkers, on emp ABX - Zosyn - recently completed Zosyn on last admission at [facility]  CT chest shows improvement in airspace disease  monitor VS and HD and Sat  SLP eval - asp prec - HOB elev  GOC discussion - code status discussion  Covid 19 with hypoxemia and comorbidities - Decadron, o2 titration, consideration for Remdesivir, Robitussin and Tylenol for sx management
nc ct reviewed, showing colitis of sigmoid colon and rectum  h/o chronic mesenteric ischemia; lactate wnl on admission  f/u am labs  send gi pcr w next bm  gentle fluids, monitor elytes   on po diet, monitor tolerance  cont abx; f/u cxs  cont daily ppi  rec bacid tid  prn pain control  monitor exam/gi fxn

## 2021-01-16 NOTE — H&P ADULT - PROBLEM SELECTOR PLAN 2
c/o diffuse abdominal pain   lactate: 1.0, WBC: 11.27, HR: 103 in the ED   - CT Abdomen/Pelvis: interval development of colitis of the sigmoid colon and rectum, of infectious or inflammatory etiology. Redemonstration of gallstones from last admission -->HIDA during last admission was negative ruling out acute cholecystitis   - Tylenol prn for pain and fever   - Trend daily CBC and temperature curve  - Received Tylenol, Zosyn, NS 1L bolus x1 in the ED  - C/w Zosyn  - F/u UCx x1, BCx x2 c/o diffuse abdominal pain   lactate: 1.0, WBC: 11.27, HR: 103 in the ED   - CT Abdomen/Pelvis: interval development of colitis of the sigmoid colon and rectum, of infectious or inflammatory etiology. Redemonstration of gallstones from last admission -->HIDA during last admission was negative ruling out acute cholecystitis   - Tylenol prn for pain and fever   - Trend daily CBC and temperature curve  - Received Tylenol, Zosyn, NS 1L bolus x1 in the ED  - C/w Zosyn  - F/u UCx x1, BCx x2  - Gastroenterology Dr. Lassiter consulted, f/u recs

## 2021-01-16 NOTE — CONSULT NOTE ADULT - SUBJECTIVE AND OBJECTIVE BOX
Patient is a 85y old  Female who presents with a chief complaint of AMS, lethargy (16 Jan 2021 10:49)    HPI:  84yo F, with PMH afib (on Coumadin), CVA and h/o aneurysm repair, infrarenal AAA, HTN, HLD, mitral valve prolapse, PVD of RLE s/p stents x3, rheumatoid arthritis, s/p hysterectomy, and L cataract, BIBEMS from [facility] for hypoxia, fever and lethargy. Pt was found to be satting 87% on RA and temperature of 101 F as per facility documentation. Pt tested positive for COVID on 1/12/21. Pt was lethargic and history is unable to be obtained.  After a few hours when seen, pt was more alert and complaining of diffuse abdominal pain.   Of note, pt was recently admitted to [place] (1/5/21-1/8/21) for multifocal PNA and was discharged on antibiotics and supplemental oxygen.   History was obtained from daughter Dixie.   As per daughter, pt     ED Course:   Vitals: BP: 137/95, HR:103 , Temp: 97.6 F, RR: 16, SpO2: 100% on NRB -->100% on 3L NC, desaturates to 90% on RA   Labs: WBC: 11.27, H/H: 11.2/34.6, neut: 10.35, ddimer: 777, PT/INR: 19/1.66, lactate: 1.0, bicarb: 17, BUN/Cr: 41/1.70, Ca: 7.6, GFR: 27  COVID PCR POSITIVE   VBG: pH: 7.37, pCO2: 36, pO2: 65, HCO3: 21, SpO2: 90%    Imaging:   CXR: left sided infiltrate improved when compared to CXR from 1/5 - pending official read   CT Head: No significant interval change compared to the previous CT of January 5, 2021.No intracranial hemorrhage, mass effect or large acute cortical infarct. Focal old left frontal and temporal encephalomalacia.  Old bilateral lacunar infarcts.  CT Chest: Marked interval improvement in multifocal pneumonia.  Emphysema. Compared to the recent CT of January 5, 2021.  CT Abdomen and Pelvis: Interval development of colitis of the sigmoid colon and rectum, of infectious or inflammatory etiology. Redemonstration of gallstones.  EKG: afib, HR: 105, no ST elevation or depression   Received Tylenol, Zosyn, NS 1L bolus x1 in the ED   (16 Jan 2021 04:50)    Renal consult called for BRIDGET.       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    Osteoarthritis    Afib    PVD (peripheral vascular disease)    Neuropathy    Gout    H/O cerebral aneurysm repair    CVA (cerebral vascular accident)    Rheumatoid arthritis    Asthma    Hyperlipidemia    Hypertension        PAST SURGICAL HISTORY:  S/P cataract surgery    Elective surgery    Elective surgery    S/P ORIF (open reduction internal fixation) fracture    H/O cerebral aneurysm repair    Renal stone    PVD (peripheral vascular disease)    S/P FRED (total abdominal hysterectomy)        FAMILY HISTORY:  No pertinent family history in first degree relatives        SOCIAL HISTORY: No smoking or alcohol use     Allergies    No Known Allergies    Intolerances      Home Medications:  acetaminophen 325 mg oral tablet: 2 tab(s) orally every 6 hours, As needed, Mild Pain (1 - 3) (16 Jan 2021 04:47)  aspirin 81 mg oral delayed release tablet: 1 tab(s) orally once a day (16 Jan 2021 04:47)  atorvastatin 40 mg oral tablet: 1 tab(s) orally once a day (at bedtime) (16 Jan 2021 04:47)  Coumadin 4 mg oral tablet: 1 tab(s) orally once a day (16 Jan 2021 04:47)  dilTIAZem 240 mg/24 hours oral capsule, extended release: 1 cap(s) orally once a day (16 Jan 2021 04:47)  gabapentin 100 mg oral capsule: 1 cap(s) orally 3 times a day (16 Jan 2021 04:47)  metoprolol succinate 100 mg oral tablet, extended release: 1 tab(s) orally once a day (16 Jan 2021 04:47)  multivitamin: 1 tab(s) orally once a day (16 Jan 2021 04:47)  pantoprazole 40 mg oral delayed release tablet: 1 tab(s) orally every 12 hours (16 Jan 2021 04:47)  Vitamin C 500 mg oral tablet: 1 tab(s) orally once a day (16 Jan 2021 04:47)  Xanax 0.25 mg oral tablet: 1 tab(s) orally once a day (at bedtime) (16 Jan 2021 04:47)    MEDICATIONS  (STANDING):  ascorbic acid 500 milliGRAM(s) Oral daily  aspirin enteric coated 81 milliGRAM(s) Oral daily  atorvastatin 40 milliGRAM(s) Oral at bedtime  budesonide  80 MICROgram(s)/formoterol 4.5 MICROgram(s) Inhaler 2 Puff(s) Inhalation two times a day  dexAMETHasone  Injectable 6 milliGRAM(s) IV Push daily  diltiazem    milliGRAM(s) Oral daily  gabapentin 100 milliGRAM(s) Oral three times a day  lactobacillus acidophilus 1 Tablet(s) Oral three times a day  metoprolol succinate  milliGRAM(s) Oral daily  multivitamin 1 Tablet(s) Oral daily  pantoprazole    Tablet 40 milliGRAM(s) Oral every 12 hours  piperacillin/tazobactam IVPB.. 3.375 Gram(s) IV Intermittent every 8 hours  sodium chloride 0.9%. 1000 milliLiter(s) (60 mL/Hr) IV Continuous <Continuous>  tiotropium 18 MICROgram(s) Capsule 1 Capsule(s) Inhalation daily    MEDICATIONS  (PRN):  acetaminophen   Tablet .. 650 milliGRAM(s) Oral every 6 hours PRN Temp greater or equal to 38C (100.4F), Mild Pain (1 - 3)  ALBUTerol    90 MICROgram(s) HFA Inhaler 2 Puff(s) Inhalation every 3 hours PRN Shortness of Breath and/or Wheezing  guaiFENesin   Syrup  (Sugar-Free) 200 milliGRAM(s) Oral every 6 hours PRN Cough      REVIEW OF SYSTEMS:  General: no distress  Respiratory: No cough, SOB  Cardiovascular: No CP or Palpitations	  Gastrointestinal: No nausea, Vomiting. No diarrhea  Genitourinary: No urinary complaints	  Musculoskeletal:  No new rash or lesions	  all other systems negative    T(F): 98.2 (01-16-21 @ 06:48), Max: 100.4 (01-15-21 @ 23:26)  HR: 99 (01-16-21 @ 06:48) (99 - 110)  BP: 106/71 (01-16-21 @ 06:48) (98/69 - 137/95)  RR: 18 (01-16-21 @ 06:48) (16 - 18)  SpO2: 97% (01-16-21 @ 06:48) (97% - 100%)  Wt(kg): --    PHYSICAL EXAM:  General: on oxygen  Respiratory: b/l air entry  Cardiovascular: S1 S2  Gastrointestinal: soft  Extremities: edema        01-16    142  |  112<H>  |  44<H>  ----------------------------<  111<H>  3.5   |  20<L>  |  1.90<H>    Ca    7.7<L>      16 Jan 2021 09:34    TPro  6.2  /  Alb  2.3<L>  /  TBili  0.5  /  DBili  x   /  AST  31  /  ALT  17  /  AlkPhos  103  01-16                          10.1   10.78 )-----------( 213      ( 16 Jan 2021 09:34 )             32.1       Hemoglobin: 10.1 g/dL (01-16 @ 09:34)  Hematocrit: 32.1 % (01-16 @ 09:34)  Potassium, Serum: 3.5 mmol/L (01-16 @ 09:34)  Blood Urea Nitrogen, Serum: 44 mg/dL (01-16 @ 09:34)      Creatinine, Serum: 1.90 (01-16 @ 09:34)  Creatinine, Serum: 1.70 (01-16 @ 00:03)        LIVER FUNCTIONS - ( 16 Jan 2021 09:34 )  Alb: 2.3 g/dL / Pro: 6.2 g/dL / ALK PHOS: 103 U/L / ALT: 17 U/L / AST: 31 U/L / GGT: x               I&O's Detail    16 Jan 2021 07:01  -  16 Jan 2021 11:15  --------------------------------------------------------  IN:    sodium chloride 0.9%: 1000 mL  Total IN: 1000 mL    OUT:  Total OUT: 0 mL    Total NET: 1000 mL      < from: CT Abdomen and Pelvis No Cont (01.16.21 @ 03:03) >    EXAM:  CT ABDOMEN AND PELVIS                          EXAM:  CT CHEST                            PROCEDURE DATE:  01/16/2021          INTERPRETATION:  CT of the chest, abdomen and pelvis    Indication: [Hypoxia], unresponsive, fever    Technique: Axial images were obtained from the thoracic inlet through pubic symphysis without oral or IV contrast. Reformatted coronal and sagittal images were submitted.    Comparison: CT of 1/5/21    FINDINGS:    Evaluation of the solid abdominal organs is limited without contrast.    A 1.2 cm left thyroid hypodensity.  The visualized axilla and subcutaneous tissues are unremarkable.    The tracheobronchial tree is patent centrally.  There is no mediastinal hematoma.  The great vessels are not enlarged.  There are nonspecific mediastinal lymph nodes are unchanged, measuring 1.3 x 1.5 cm in the left paratracheal region and is stable and 1.1 x 0.8 cm in the right paratracheal region.  Atherosclerotic calcifications of the thoracic aorta and coronary arteries.    The heart is not enlarged.  There is no pericardial effusion.    Lungs: Mild to moderate centrilobular emphysema.  Previously visualized right middle and lower lobe and left upper lobe opacities are significantly decreased compared to the previous study of January 5, 2021. There are mild patchy opacities at the lung bases.    Pleura: Trace right pleural effusion.  There is no free air or focal collection.  No free fluid.    Liver: Normal.  Spleen: Normal.  Gallbladder: Moderately distendedwith gallstones.  Biliary tree: Unremarkable.  Adrenal glands: Normal.  Pancreas: Normal.  Kidneys: No hydronephrosis or obstructing stones. Nonobstructive right renal stones measure up to 7 mm.    Bowel:  There is no small bowel obstruction.  There is moderate thickening of the sigmoid colon and rectum compatible with colitis. There is surrounding inflammation without free fluid.    Bladder: Decompressed.  Pelvic organs: No pelvic masses.    There is no significant adenopathy.  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 Moderate atherosclerotic vascular calcification. Right external iliac bypass graft.    Retroperitoneum: There is no mass.  Bones: The bones are diffusely osteopenic. The patient is status post ORIF involving the bilateral proximal femurs  Subcutaneous tissues: Unremarkable.    IMPRESSION:    Marked interval improvement in multifocal pneumonia.  Emphysema.    Compared to the recent CT of January 5, 2021, interval development of colitis of the sigmoid colon and rectum, of infectious or inflammatory etiology.    Redemonstration of gallstones.      ALISIA BERNARD MD; Attending Radiologist  This document has been electronically signed. Jan 16 2021  3:44AM    < end of copied text >         Patient is a 85y old  Female who presents with a chief complaint of AMS, lethargy (16 Jan 2021 10:49)    HPI:  84yo F, with PMH afib (on Coumadin), CVA and h/o aneurysm repair, infrarenal AAA, HTN, HLD, mitral valve prolapse, PVD of RLE s/p stents x3, rheumatoid arthritis, s/p hysterectomy, and L cataract, BIBEMS from [facility] for hypoxia, fever and lethargy. Pt was found to be satting 87% on RA and temperature of 101 F as per facility documentation. Pt tested positive for COVID on 1/12/21. Pt was lethargic and history is unable to be obtained.  After a few hours when seen, pt was more alert and complaining of diffuse abdominal pain.   Of note, pt was recently admitted to [place] (1/5/21-1/8/21) for multifocal PNA and was discharged on antibiotics and supplemental oxygen.   History was obtained from daughter Dixie.   As per daughter, pt     ED Course:   Vitals: BP: 137/95, HR:103 , Temp: 97.6 F, RR: 16, SpO2: 100% on NRB -->100% on 3L NC, desaturates to 90% on RA   Labs: WBC: 11.27, H/H: 11.2/34.6, neut: 10.35, ddimer: 777, PT/INR: 19/1.66, lactate: 1.0, bicarb: 17, BUN/Cr: 41/1.70, Ca: 7.6, GFR: 27  COVID PCR POSITIVE   VBG: pH: 7.37, pCO2: 36, pO2: 65, HCO3: 21, SpO2: 90%    Imaging:   CXR: left sided infiltrate improved when compared to CXR from 1/5 - pending official read   CT Head: No significant interval change compared to the previous CT of January 5, 2021.No intracranial hemorrhage, mass effect or large acute cortical infarct. Focal old left frontal and temporal encephalomalacia.  Old bilateral lacunar infarcts.  CT Chest: Marked interval improvement in multifocal pneumonia.  Emphysema. Compared to the recent CT of January 5, 2021.  CT Abdomen and Pelvis: Interval development of colitis of the sigmoid colon and rectum, of infectious or inflammatory etiology. Redemonstration of gallstones.  EKG: afib, HR: 105, no ST elevation or depression   Received Tylenol, Zosyn, NS 1L bolus x1 in the ED   (16 Jan 2021 04:50)    Renal consult called for BRIDGET. Pt resting in bed. + Abd pain      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    Osteoarthritis    Afib    PVD (peripheral vascular disease)    Neuropathy    Gout    H/O cerebral aneurysm repair    CVA (cerebral vascular accident)    Rheumatoid arthritis    Asthma    Hyperlipidemia    Hypertension        PAST SURGICAL HISTORY:  S/P cataract surgery    Elective surgery    Elective surgery    S/P ORIF (open reduction internal fixation) fracture    H/O cerebral aneurysm repair    Renal stone    PVD (peripheral vascular disease)    S/P FRED (total abdominal hysterectomy)        FAMILY HISTORY:  No pertinent family history in first degree relatives        SOCIAL HISTORY: No smoking or alcohol use     Allergies    No Known Allergies    Intolerances      Home Medications:  acetaminophen 325 mg oral tablet: 2 tab(s) orally every 6 hours, As needed, Mild Pain (1 - 3) (16 Jan 2021 04:47)  aspirin 81 mg oral delayed release tablet: 1 tab(s) orally once a day (16 Jan 2021 04:47)  atorvastatin 40 mg oral tablet: 1 tab(s) orally once a day (at bedtime) (16 Jan 2021 04:47)  Coumadin 4 mg oral tablet: 1 tab(s) orally once a day (16 Jan 2021 04:47)  dilTIAZem 240 mg/24 hours oral capsule, extended release: 1 cap(s) orally once a day (16 Jan 2021 04:47)  gabapentin 100 mg oral capsule: 1 cap(s) orally 3 times a day (16 Jan 2021 04:47)  metoprolol succinate 100 mg oral tablet, extended release: 1 tab(s) orally once a day (16 Jan 2021 04:47)  multivitamin: 1 tab(s) orally once a day (16 Jan 2021 04:47)  pantoprazole 40 mg oral delayed release tablet: 1 tab(s) orally every 12 hours (16 Jan 2021 04:47)  Vitamin C 500 mg oral tablet: 1 tab(s) orally once a day (16 Jan 2021 04:47)  Xanax 0.25 mg oral tablet: 1 tab(s) orally once a day (at bedtime) (16 Jan 2021 04:47)    MEDICATIONS  (STANDING):  ascorbic acid 500 milliGRAM(s) Oral daily  aspirin enteric coated 81 milliGRAM(s) Oral daily  atorvastatin 40 milliGRAM(s) Oral at bedtime  budesonide  80 MICROgram(s)/formoterol 4.5 MICROgram(s) Inhaler 2 Puff(s) Inhalation two times a day  dexAMETHasone  Injectable 6 milliGRAM(s) IV Push daily  diltiazem    milliGRAM(s) Oral daily  gabapentin 100 milliGRAM(s) Oral three times a day  lactobacillus acidophilus 1 Tablet(s) Oral three times a day  metoprolol succinate  milliGRAM(s) Oral daily  multivitamin 1 Tablet(s) Oral daily  pantoprazole    Tablet 40 milliGRAM(s) Oral every 12 hours  piperacillin/tazobactam IVPB.. 3.375 Gram(s) IV Intermittent every 8 hours  sodium chloride 0.9%. 1000 milliLiter(s) (60 mL/Hr) IV Continuous <Continuous>  tiotropium 18 MICROgram(s) Capsule 1 Capsule(s) Inhalation daily    MEDICATIONS  (PRN):  acetaminophen   Tablet .. 650 milliGRAM(s) Oral every 6 hours PRN Temp greater or equal to 38C (100.4F), Mild Pain (1 - 3)  ALBUTerol    90 MICROgram(s) HFA Inhaler 2 Puff(s) Inhalation every 3 hours PRN Shortness of Breath and/or Wheezing  guaiFENesin   Syrup  (Sugar-Free) 200 milliGRAM(s) Oral every 6 hours PRN Cough      REVIEW OF SYSTEMS:  General: no distress  Respiratory: No cough, SOB  Cardiovascular: No CP or Palpitations	  Gastrointestinal: + abd pain  Genitourinary: No urinary complaints	  Musculoskeletal:  No new rash or lesions	  all other systems negative    T(F): 98.2 (01-16-21 @ 06:48), Max: 100.4 (01-15-21 @ 23:26)  HR: 99 (01-16-21 @ 06:48) (99 - 110)  BP: 106/71 (01-16-21 @ 06:48) (98/69 - 137/95)  RR: 18 (01-16-21 @ 06:48) (16 - 18)  SpO2: 97% (01-16-21 @ 06:48) (97% - 100%)  Wt(kg): --    PHYSICAL EXAM:  General: on oxygen  Respiratory: b/l air entry  Cardiovascular: S1 S2  Gastrointestinal: soft, + tender  Extremities: edema        01-16    142  |  112<H>  |  44<H>  ----------------------------<  111<H>  3.5   |  20<L>  |  1.90<H>    Ca    7.7<L>      16 Jan 2021 09:34    TPro  6.2  /  Alb  2.3<L>  /  TBili  0.5  /  DBili  x   /  AST  31  /  ALT  17  /  AlkPhos  103  01-16                          10.1   10.78 )-----------( 213      ( 16 Jan 2021 09:34 )             32.1       Hemoglobin: 10.1 g/dL (01-16 @ 09:34)  Hematocrit: 32.1 % (01-16 @ 09:34)  Potassium, Serum: 3.5 mmol/L (01-16 @ 09:34)  Blood Urea Nitrogen, Serum: 44 mg/dL (01-16 @ 09:34)      Creatinine, Serum: 1.90 (01-16 @ 09:34)  Creatinine, Serum: 1.70 (01-16 @ 00:03)        LIVER FUNCTIONS - ( 16 Jan 2021 09:34 )  Alb: 2.3 g/dL / Pro: 6.2 g/dL / ALK PHOS: 103 U/L / ALT: 17 U/L / AST: 31 U/L / GGT: x               I&O's Detail    16 Jan 2021 07:01  -  16 Jan 2021 11:15  --------------------------------------------------------  IN:    sodium chloride 0.9%: 1000 mL  Total IN: 1000 mL    OUT:  Total OUT: 0 mL    Total NET: 1000 mL      < from: CT Abdomen and Pelvis No Cont (01.16.21 @ 03:03) >    EXAM:  CT ABDOMEN AND PELVIS                          EXAM:  CT CHEST                            PROCEDURE DATE:  01/16/2021          INTERPRETATION:  CT of the chest, abdomen and pelvis    Indication: [Hypoxia], unresponsive, fever    Technique: Axial images were obtained from the thoracic inlet through pubic symphysis without oral or IV contrast. Reformatted coronal and sagittal images were submitted.    Comparison: CT of 1/5/21    FINDINGS:    Evaluation of the solid abdominal organs is limited without contrast.    A 1.2 cm left thyroid hypodensity.  The visualized axilla and subcutaneous tissues are unremarkable.    The tracheobronchial tree is patent centrally.  There is no mediastinal hematoma.  The great vessels are not enlarged.  There are nonspecific mediastinal lymph nodes are unchanged, measuring 1.3 x 1.5 cm in the left paratracheal region and is stable and 1.1 x 0.8 cm in the right paratracheal region.  Atherosclerotic calcifications of the thoracic aorta and coronary arteries.    The heart is not enlarged.  There is no pericardial effusion.    Lungs: Mild to moderate centrilobular emphysema.  Previously visualized right middle and lower lobe and left upper lobe opacities are significantly decreased compared to the previous study of January 5, 2021. There are mild patchy opacities at the lung bases.    Pleura: Trace right pleural effusion.  There is no free air or focal collection.  No free fluid.    Liver: Normal.  Spleen: Normal.  Gallbladder: Moderately distendedwith gallstones.  Biliary tree: Unremarkable.  Adrenal glands: Normal.  Pancreas: Normal.  Kidneys: No hydronephrosis or obstructing stones. Nonobstructive right renal stones measure up to 7 mm.    Bowel:  There is no small bowel obstruction.  There is moderate thickening of the sigmoid colon and rectum compatible with colitis. There is surrounding inflammation without free fluid.    Bladder: Decompressed.  Pelvic organs: No pelvic masses.    There is no significant adenopathy.  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 Moderate atherosclerotic vascular calcification. Right external iliac bypass graft.    Retroperitoneum: There is no mass.  Bones: The bones are diffusely osteopenic. The patient is status post ORIF involving the bilateral proximal femurs  Subcutaneous tissues: Unremarkable.    IMPRESSION:    Marked interval improvement in multifocal pneumonia.  Emphysema.    Compared to the recent CT of January 5, 2021, interval development of colitis of the sigmoid colon and rectum, of infectious or inflammatory etiology.    Redemonstration of gallstones.      ALISIA BERNARD MD; Attending Radiologist  This document has been electronically signed. Jan 16 2021  3:44AM    < end of copied text >

## 2021-01-16 NOTE — CONSULT NOTE ADULT - ASSESSMENT
BRIDGET: Prerenal azotemia, ATN  COVID +, Pneumonia  Colitis  Hypertension BRIDGET: Prerenal azotemia, ATN  COVID +, Pneumonia  Colitis  Hypertension    Continue gentle IV hydration. Rx for COVID. Avoid nephrotoxic meds as possible. Monitor BP trend. Titrate BP meds as needed. Salt restriction.   Will follow electrolytes and renal function trend. Further recommendations pending clinical course. Thank you for the courtesy of this referral.

## 2021-01-16 NOTE — H&P ADULT - NSHPSOCIALHISTORY_GEN_ALL_CORE
Tobacco: Denies  EtOH: Denies   Recreational drug use: Denies  Lives with: Alone at home with visits from [facility], recently discharged to rehab   Ambulates: With walker   Vaccinations: Got flu and PNA vaccine

## 2021-01-16 NOTE — H&P ADULT - PROBLEM SELECTOR PLAN 6
Chronic  - Takes atorvastatin 40mg qD at home, continue Chronic  - Takes atorvastatin 40mg qD and aspirin at home, continue Chronic   - Stable  - Takes diltiazem 240mg qD, and metoprolol succinate 100mg qD at home, continue   - Monitor routine hemodynamics

## 2021-01-16 NOTE — H&P ADULT - PROBLEM SELECTOR PLAN 1
Acute hypoxic respiratory failure 2/2 confirmed COVID-19 infection  - Admit w/ remote telemetry monitoring  - Patient currently saturating well on 3L NC  - Supplemental O2 PRN to maintain O2 sats >88%, escalate therapy as appropriate. Prone PRN  - Monitor volume status closely, will exercise caution with aggressive hydration  - Will start decadron 6 mg x10 days   - Will hold off on remdesivir due to kidney function--f/u AM GFR and can start remdesivir if GFR>30   - Tylenol prn myalgias, fever  - Avoid nebs. continue with MDI prn.  - QTc: 496  - Daily labs to include: CBC with differential along with a CMP  - Ferritin, CRP, d-dimer, procal at admission then will repeat as clinically warranted  - Will initiate VTE ppx unless absolute contraindication- Pt is on Coumadin due to afib   - Advanced care planning/code status: Full code  - ID Dr. Anthony consulted, f/u recs Acute hypoxic respiratory failure 2/2 confirmed COVID-19 infection  - Admit w/ remote telemetry monitoring  - Patient currently saturating well on 3L NC  - Supplemental O2 PRN to maintain O2 sats >88%, escalate therapy as appropriate. Prone PRN  - Monitor volume status closely, will exercise caution with aggressive hydration  - Will start decadron 6 mg x10 days   - Will hold off on remdesivir due to kidney function--f/u AM GFR and can start remdesivir if GFR>30   - Tylenol prn myalgias, fever  - Avoid nebs. continue with MDI prn.  - QTc: 496, prolonged   - Daily labs to include: CBC with differential along with a CMP  - Ferritin, CRP, d-dimer, procal at admission then will repeat as clinically warranted  - Will initiate VTE ppx unless absolute contraindication- Pt is on Coumadin due to afib   - Advanced care planning/code status: Full code, Palliative consult   - ID Dr. Anthony consulted, f/u recs Acute hypoxic respiratory failure 2/2 confirmed COVID-19 infection  - Admit w/ remote telemetry monitoring  - Patient currently saturating well on 3L NC  - Supplemental O2 PRN to maintain O2 sats >88%, escalate therapy as appropriate. Prone PRN  - Monitor volume status closely, will exercise caution with aggressive hydration  - Will start decadron 6 mg x10 days   - Will hold off on remdesivir due to kidney function--f/u AM GFR and can start remdesivir if GFR>30 - pt at approx day 14 of symptoms including symptoms leading up to prior admission.  - Tylenol prn myalgias, fever  - Avoid nebs. continue with MDI prn.  - QTc: 496, prolonged   - Daily labs to include: CBC with differential along with a CMP  - Ferritin, CRP, d-dimer, procal at admission then will repeat as clinically warranted  - Will initiate VTE ppx unless absolute contraindication- Pt is on Coumadin due to afib . if am INR subtherapeutic will bridge with heparin gtt  - Advanced care planning/code status: Full code, Palliative consult   - ID Dr. Anthony consulted, f/u recs

## 2021-01-16 NOTE — H&P ADULT - ASSESSMENT
86yo F, with PMH afib (on Coumadin), CVA and h/o aneurysm repair, infrarenal AAA, HTN, HLD, mitral valve prolapse, PVD of RLE s/p stents x3, rheumatoid arthritis, s/p hysterectomy, and L cataract, BIBEMS from [facility] for hypoxia, fever and lethargy. Admitted for acute hypoxic respiratory failure 2/2 confirmed COVID-19 infection and colitis.

## 2021-01-16 NOTE — H&P ADULT - PROBLEM SELECTOR PLAN 9
VTE PPX: Pt is on Coumadin for afib, will f/u daily INR and dose accordingly VTE PPX: Pt is on Coumadin for afib, will f/u daily INR and dose accordingly    -GI PPX: Pepcid - Known h/o fusiform infrarenal AAA without significant change last evaluated during 1/2020 admission by vascular surgery with no acute intervention at that time  - Recommended outpatient follow up.

## 2021-01-16 NOTE — H&P ADULT - PROBLEM SELECTOR PLAN 5
Chronic   - Stable  - Takes diltiazem 240mg qD, and metoprolol succinate 100mg qD at home, continue   - Monitor routine hemodynamics Chronic    - Known h/o afib, was discharged on Coumadin 5 mg BID W/Sat and 2.5mg for the rest of the week; but pt was changed to Coumadin 4 mg QD, most recent INR on 1/12 was 1.3  . will bridge if inr subtherapetuic   - Takes diltiazem 240mg qD and metoprolol succinate 100mg qD at home, continue   - EKG: afib, HR: 105, no ST elevation or depression   - Most recent TTE on last admission-Low-normal left ventricular systolic function, estimated LVEF of 50%. Mild AI. Moderate MR. Mild TR.    - Monitor daily INR and dose Coumadin accordingly

## 2021-01-16 NOTE — H&P ADULT - NSHPPHYSICALEXAM_GEN_ALL_CORE
T(C): 36.4 (01-15-21 @ 21:24), Max: 36.4 (01-15-21 @ 21:24)  HR: 103 (01-15-21 @ 21:24) (103 - 103)  BP: 137/95 (01-15-21 @ 21:24) (137/95 - 137/95)  RR: 16 (01-15-21 @ 21:24) (16 - 16)  SpO2: 100% (01-15-21 @ 21:24) (100% - 100%)    GENERAL: patient appears in distress, moaning in pain, on RA   EYES: sclera clear, no exudates  ENMT: oropharynx clear without erythema, no exudates, dry mucous membranes  NECK: supple, soft  LUNGS: decreased BS BL, no wheezing or rhonchi appreciated  HEART: soft S1/S2, irregular rhythm, no murmurs noted, no lower extremity edema  GASTROINTESTINAL: abdomen is soft, tenderness to palpation, nondistended, normoactive bowel sounds  INTEGUMENT: good skin turgor, warm skin, appears well perfused  MUSCULOSKELETAL: no clubbing or cyanosis, no obvious deformity  NEUROLOGIC: awake, alert, oriented x3, good muscle tone in all 4 extremities  HEME/LYMPH: no obvious ecchymosis or petechiae T(C): 36.4 (01-15-21 @ 21:24), Max: 36.4 (01-15-21 @ 21:24)  HR: 103 (01-15-21 @ 21:24) (103 - 103)  BP: 137/95 (01-15-21 @ 21:24) (137/95 - 137/95)  RR: 16 (01-15-21 @ 21:24) (16 - 16)  SpO2: 100% (01-15-21 @ 21:24) (100% - 100%)    GENERAL: patient appears in distress, moaning in pain, on RA   EYES: sclera clear, no exudates  ENMT: oropharynx clear without erythema, no exudates, dry mucous membranes  NECK: supple, soft  LUNGS: decreased BS BL, no wheezing or rhonchi appreciated  HEART: soft S1/S2, irregular rhythm, no murmurs noted, no lower extremity edema  GASTROINTESTINAL: abdomen is soft, tenderness to palpation x 4 quadrants, nondistended, normoactive bowel sounds  INTEGUMENT: good skin turgor, warm skin, appears well perfused  MUSCULOSKELETAL: no clubbing or cyanosis, no obvious deformity  NEUROLOGIC: awake, alert, oriented x3, good muscle tone in all 4 extremities  HEME/LYMPH: no obvious ecchymosis or petechiae

## 2021-01-16 NOTE — CHART NOTE - NSCHARTNOTEFT_GEN_A_CORE
Patient seen and examined at bedside. Feels very weak and tired, wants to rest. H&P from today reviewed. Prognosis is guarded. F/u pulmonary and ID consults.

## 2021-01-16 NOTE — H&P ADULT - NSHPREVIEWOFSYSTEMS_GEN_ALL_CORE
ROS is limited due to pt being in pain, lethargy has improved      CONSTITUTIONAL: +fever, denies chills, fatigue, weakness  HEENT: denies blurred vision, sore throat  SKIN: denies new lesions, rash  CARDIOVASCULAR: denies chest pain, chest pressure, palpitations  RESPIRATORY: denies shortness of breath, cough, sputum production  GASTROINTESTINAL: denies nausea, vomiting, diarrhea, +abdominal pain  GENITOURINARY: denies dysuria, discharge  NEUROLOGICAL: denies numbness, headache, focal weakness  MUSCULOSKELETAL: denies new joint pain, muscle aches  HEMATOLOGIC: denies gross bleeding, bruising

## 2021-01-17 NOTE — PROGRESS NOTE ADULT - SUBJECTIVE AND OBJECTIVE BOX
CARITO CONSTANTINO  MRN-[id number]    Follow Up:  sigmoid colitis, fevers, COVID 19    Interval History: The pt was seen and examined, less lethargic today, continues to complains of abdominal pain. Critical care was consulted today for hypoxia, pt was not a candidate for ICU transfer. The pt is somewhat forgetful, thinks she had a bowel movement earlier, there was no bowel movement as per RN and nursing assistant. The pt is afebrile, WBC increased, pt is on Decadron, renal function is worsening, pt is on supplemental O2 NC, blood cultures with no growth.     ROS:  limited, pt is lethargic - no fevers, no chest pain, + abdominal pain, + nausea, states that she had a bowel movement, no dysuria    [ ] Unobtainable because:  [ ] All other systems negative    Constitutional: no fever, no chills  Head: no trauma  Eyes: no vision changes, no eye pain  ENT:  no sore throat, no rhinorrhea  Cardiovascular:  no chest pain, no palpitation  Respiratory:  no SOB, no cough  GI:  no abd pain, no vomiting, no diarrhea  urinary: no dysuria, no hematuria, no flank pain  musculoskeletal:  no joint pain, no joint swelling  skin:  no rash  neurology:  no headache, no seizure, no change in mental status  psych: no anxiety, no depression         Allergies  No Known Allergies        ANTIMICROBIALS:  cefepime   IVPB    cefepime   IVPB 1000 every 24 hours  metroNIDAZOLE  IVPB    metroNIDAZOLE  IVPB 500 every 8 hours      OTHER MEDS:  acetaminophen   Tablet .. 650 milliGRAM(s) Oral every 6 hours PRN  ALBUTerol    90 MICROgram(s) HFA Inhaler 2 Puff(s) Inhalation every 3 hours PRN  ascorbic acid 500 milliGRAM(s) Oral daily  aspirin enteric coated 81 milliGRAM(s) Oral daily  atorvastatin 40 milliGRAM(s) Oral at bedtime  budesonide  80 MICROgram(s)/formoterol 4.5 MICROgram(s) Inhaler 2 Puff(s) Inhalation two times a day  dexAMETHasone  Injectable 6 milliGRAM(s) IV Push daily  diltiazem    milliGRAM(s) Oral daily  gabapentin 100 milliGRAM(s) Oral three times a day  guaiFENesin   Syrup  (Sugar-Free) 200 milliGRAM(s) Oral every 6 hours PRN  lactobacillus acidophilus 1 Tablet(s) Oral three times a day  metoprolol tartrate 12.5 milliGRAM(s) Oral every 6 hours  multivitamin 1 Tablet(s) Oral daily  pantoprazole    Tablet 40 milliGRAM(s) Oral every 12 hours  sodium chloride 0.9%. 1000 milliLiter(s) IV Continuous <Continuous>  sodium chloride 0.9%. 1000 milliLiter(s) IV Continuous <Continuous>  tiotropium 18 MICROgram(s) Capsule 1 Capsule(s) Inhalation daily      Vital Signs Last 24 Hrs  T(C): 36.4 (17 Jan 2021 13:30), Max: 36.4 (17 Jan 2021 04:48)  T(F): 97.5 (17 Jan 2021 13:30), Max: 97.5 (17 Jan 2021 04:48)  HR: 103 (17 Jan 2021 13:30) (68 - 103)  BP: 118/83 (17 Jan 2021 13:30) (88/60 - 118/83)  BP(mean): --  RR: 17 (17 Jan 2021 13:30) (17 - 18)  SpO2: 96% (17 Jan 2021 13:30) (96% - 98%)    Physical Exam:  Constitutional: less lethargic female, on supplemental O2 via NC, temporal wasting   HEAD/EYES: anicteric, no conjunctival injection  ENT:  supple, no thrush, no oral lesions   Cardiovascular:  irregular, no murmur, no edema  Respiratory:  fine scattered crackles bilaterally, no wheezes, no rales, no rhonchi   GI:  soft,  pt's abdomen is less tender in LLQ and suprapubic area,  normal bowel sounds,  no rebound tenderness   :  no chaudhary, no CVA tenderness  Musculoskeletal:  no joint swelling, deforming arthritis  Neurologic: pt is lethargic,  able to move her legs and arms  Skin:  no rash, no erythema, no phlebitis  Heme/Onc: no lymphadenopathy   Psychiatric:  appropriate, forgetful    WBC Count: 18.13 K/uL (01-17 @ 05:43)  WBC Count: 10.78 K/uL (01-16 @ 09:34)  WBC Count: 11.27 K/uL (01-16 @ 00:03)                            10.7   18.13 )-----------( 225      ( 17 Jan 2021 05:43 )             34.5       01-17    142  |  112<H>  |  60<H>  ----------------------------<  119<H>  4.1   |  19<L>  |  2.50<H>    Ca    7.5<L>      17 Jan 2021 05:43    TPro  5.9<L>  /  Alb  2.1<L>  /  TBili  0.3  /  DBili  x   /  AST  37  /  ALT  15  /  AlkPhos  81  01-17          Creatinine Trend: 2.50<--, 1.90<--, 1.70<--, 1.40<--, 1.20<--, 1.00<--  Lactate, Blood: 1.3 mmol/L (01-16-21 @ 09:34)  Lactate, Blood: 1.0 mmol/L (01-16-21 @ 00:04)      MICROBIOLOGY:  v  .Blood Blood-Peripheral  01-16-21   No growth to date.  --  --      .Blood Blood  01-05-21   No Growth Final  --  --              COVID-19 PCR: Detected (01-16)  COVID-19 PCR: NotDetec (01-06)  COVID-19 PCR: NotDetec (01-05)    C-Reactive Protein, Serum: 6.22 (01-16)    Ferritin, Serum: 414 (01-16)      D-Dimer Assay, Quantitative: 777 (01-16)  D-Dimer Assay, Quantitative: 1004 (01-05)    Procalcitonin, Serum: 1.02 (01-16-21 @ 09:34)      RADIOLOGY:

## 2021-01-17 NOTE — CONSULT NOTE ADULT - ASSESSMENT
85f well known to our office and sees Dr. Laughlin.  She has afib (on Coumadin), GIB, CVA and h/o intracranial aneurysm repair, infrarenal AAA, HTN, HLD, mitral valve prolapse, PVD of RLE with failed bypass, failed redo bypass and failed multiple stents, with persistent occlusion.  She has rheumatoid arthritis, s/p hysterectomy, and L cataract.  She is known to have a borderline lvef of 50 by echo done 1 week ago when she was hosp with pna and an acute abd process.  At that time she was more rapid af given that she was not taking her meds, and her meds were adjusted to include metop 12.5 q6 and dilt 240.  Metop was changed to xl 100 at [place] from [facility] for hypoxia, fever and lethargy. She has been treated for COVID pna/resp failure and colitis. She is being treated with decadron for COVID, but her BRIDGET precludes her from receiving remdesivir.  GI/ID following for colitis and recently switched to cefepime and flagyl.  She has had worsened issues with hypoxia and hypotension, and both icu and cardiology were consulted this am.      She does not report any sig sxs now.  She does say her abdomen is sore.  She is aware that she is covid +      -there is no evidence of acute ischemia.  -known pad and presumably has cad, but not active at present  -cont asa  -cont statin    -had paf which now seems chronic  -her rate control meds have been modified a few times recently  -at the time of last adm was on dilt cd 240 and metop 12.5 q6, transitioned to xl 100 daily  -bp is now borderline  -would try to use short acting again while we are having issues with bp and will change to metop 12.5 q6 with hold parameters  -inr goal is 2-3, slightly supertherapeutic at the moment in the setting of colitis and abx, follow closely    -there is no evidence for meaningful  volume overload.  -ef ~50 on echo 1 week ago no need to repeat  -is on ivf given abd process and apparent prerenal azotemia, watch closely for the development of hf    -abd process being treated  -leukocytosis, unclear how much is steroid related  -GI followup     -worsening bun and creat, ?hydration related  -is on ivf 60 cc/hr  -consider chaudhary  -renal followup noted    -monitor electrolytes, keep k>4, Mg>2  -will follow

## 2021-01-17 NOTE — CONSULT NOTE ADULT - SUBJECTIVE AND OBJECTIVE BOX
[facility]         Dahiana Bañuelos, Evens, Toney, Terrie, Truman, Filiberto        [phone] (office)    CHIEF COMPLAINT: Patient is a 85y old  Female who presents with a chief complaint of AMS, lethargy (17 Jan 2021 09:12)      HPI:  85f well known to our office and sees Dr. Laughlin.  She has afib (on Coumadin), GIB, CVA and h/o intracranial aneurysm repair, infrarenal AAA, HTN, HLD, mitral valve prolapse, PVD of RLE with failed bypass, failed redo bypass and failed multiple stents, with persistent occlusion.  She has rheumatoid arthritis, s/p hysterectomy, and L cataract, BIBEMS from [email or web address] for hypoxia, fever and lethargy. She has been treated for COVID pna/resp failure and colitis. She is being treated with decadron for COVID, but her BRIDGET precludes her from receiving remdesivir.  GI/ID following for colitis and recently switched to cefepime and flagyl.  She has had worsened issues with hypoxia and hypotension, and both icu and cardiology were consulted this am.           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    Osteoarthritis    Afib    PVD (peripheral vascular disease)    Neuropathy  (Right lower leg)    H/O cerebral aneurysm repair  brain clips    CVA (cerebral vascular accident)  (&quot;Mini-stroke&quot;,1990&#x27;s)    Rheumatoid arthritis    Hyperlipidemia    Hypertension    S/P cataract surgery  (Left eye)    Elective surgery  (&quot;Twisted bowel&quot;, 2014)    Elective surgery  (Exision of cyst on liver, 1985)    S/P ORIF (open reduction internal fixation) fracture  Left hip fx (2012) &amp; R hip fx (2013)    H/O cerebral aneurysm repair  Brain clips (1978(    Renal stone  Cysto stent placement 10/1/2014    PVD (peripheral vascular disease)  s/p RLE bypass x 3, most recent 3/2012 Right external iliac to PT bypass w/ PTFE (2012)    S/P FRED (total abdominal hysterectomy)  (1987, Hx of &quot;ovarian cancer?&quot;)        SOCIAL HISTORY: No active tobacco, alcohol or illicit drug use    FAMILY HISTORY:  No pertinent family history in first degree relatives     No pertinent family history of CAD    Outpatient medications:  · 	acetaminophen 325 mg oral tablet: Last Dose Taken:  , 2 tab(s) orally every 6 hours, As needed, Mild Pain (1 - 3)  · 	dilTIAZem 240 mg/24 hours oral capsule, extended release: Last Dose Taken:  , 1 cap(s) orally once a day  · 	pantoprazole 40 mg oral delayed release tablet: Last Dose Taken:  , 1 tab(s) orally every 12 hours  · 	atorvastatin 40 mg oral tablet: Last Dose Taken:  , 1 tab(s) orally once a day (at bedtime)  · 	aspirin 81 mg oral delayed release tablet: Last Dose Taken:  , 1 tab(s) orally once a day  · 	metoprolol succinate 100 mg oral tablet, extended release: Last Dose Taken:  , 1 tab(s) orally once a day  · 	multivitamin: Last Dose Taken:  , 1 tab(s) orally once a day  · 	gabapentin 100 mg oral capsule: Last Dose Taken:  , 1 cap(s) orally 3 times a day  · 	Vitamin C 500 mg oral tablet: Last Dose Taken:  , 1 tab(s) orally once a day  · 	Xanax 0.25 mg oral tablet: Last Dose Taken:  , 1 tab(s) orally once a day (at bedtime)  · 	Coumadin 4 mg oral tablet: Last Dose Taken:  , 1 tab(s) orally once a day    MEDICATIONS  (STANDING):  ascorbic acid 500 milliGRAM(s) Oral daily  aspirin enteric coated 81 milliGRAM(s) Oral daily  atorvastatin 40 milliGRAM(s) Oral at bedtime  budesonide  80 MICROgram(s)/formoterol 4.5 MICROgram(s) Inhaler 2 Puff(s) Inhalation two times a day  cefepime   IVPB 1000 milliGRAM(s) IV Intermittent every 24 hours  cefepime   IVPB      dexAMETHasone  Injectable 6 milliGRAM(s) IV Push daily  diltiazem    milliGRAM(s) Oral daily  gabapentin 100 milliGRAM(s) Oral three times a day  lactobacillus acidophilus 1 Tablet(s) Oral three times a day  metoprolol tartrate 12.5 milliGRAM(s) Oral daily  metroNIDAZOLE  IVPB      metroNIDAZOLE  IVPB 500 milliGRAM(s) IV Intermittent every 8 hours  multivitamin 1 Tablet(s) Oral daily  pantoprazole    Tablet 40 milliGRAM(s) Oral every 12 hours  sodium chloride 0.9%. 1000 milliLiter(s) (60 mL/Hr) IV Continuous <Continuous>  sodium chloride 0.9%. 1000 milliLiter(s) (60 mL/Hr) IV Continuous <Continuous>  tiotropium 18 MICROgram(s) Capsule 1 Capsule(s) Inhalation daily    MEDICATIONS  (PRN):  acetaminophen   Tablet .. 650 milliGRAM(s) Oral every 6 hours PRN Temp greater or equal to 38C (100.4F), Mild Pain (1 - 3)  ALBUTerol    90 MICROgram(s) HFA Inhaler 2 Puff(s) Inhalation every 3 hours PRN Shortness of Breath and/or Wheezing  guaiFENesin   Syrup  (Sugar-Free) 200 milliGRAM(s) Oral every 6 hours PRN Cough      Allergies    No Known Allergies    Intolerances        REVIEW OF SYSTEMS: Is negative for eye, ENT, GI, , allergic, dermatologic, musculoskeletal and neurologic, except as described above.    VITAL SIGNS:   Vital Signs Last 24 Hrs  T(C): 36.4 (17 Jan 2021 04:48), Max: 36.4 (17 Jan 2021 04:48)  T(F): 97.5 (17 Jan 2021 04:48), Max: 97.5 (17 Jan 2021 04:48)  HR: 101 (17 Jan 2021 08:59) (68 - 101)  BP: 103/69 (17 Jan 2021 08:59) (88/60 - 103/69)  BP(mean): --  RR: 18 (17 Jan 2021 04:48) (17 - 18)  SpO2: 96% (17 Jan 2021 04:48) (96% - 98%)    I&O's Summary    16 Jan 2021 07:01  -  17 Jan 2021 07:00  --------------------------------------------------------  IN: 1150 mL / OUT: 250 mL / NET: 900 mL        PHYSICAL EXAM:    Constitutional: NAD, awake and alert, well-developed  Eyes:  EOMI, no oral cyanosis, conjunctivae clear, anicteric.  Pulmonary: Non-labored, breath sounds are clear bilaterally, no wheezing, rales or rhonchi  Cardiovascular:  regular S1 and S2. No murmur.  No rubs, gallops or clicks  Gastrointestinal: Bowel Sounds present, soft, nontender.   Lymph: No peripheral edema.   Neurological: Alert, strength and sensitivity are grossly intact  Skin: No obvious lesions/rashes.   Psych:  Mood & affect appropriate .    LABS: All Labs Reviewed:                        10.7   18.13 )-----------( 225      ( 17 Jan 2021 05:43 )             34.5                         10.1   10.78 )-----------( 213      ( 16 Jan 2021 09:34 )             32.1                         11.2   11.27 )-----------( 189      ( 16 Jan 2021 00:03 )             34.6     17 Jan 2021 05:43    142    |  112    |  60     ----------------------------<  119    4.1     |  19     |  2.50   16 Jan 2021 09:34    142    |  112    |  44     ----------------------------<  111    3.5     |  20     |  1.90   16 Jan 2021 00:03    138    |  111    |  41     ----------------------------<  128    3.7     |  17     |  1.70     Ca    7.5        17 Jan 2021 05:43  Ca    7.7        16 Jan 2021 09:34  Ca    7.6        16 Jan 2021 00:03    TPro  5.9    /  Alb  2.1    /  TBili  0.3    /  DBili  x      /  AST  37     /  ALT  15     /  AlkPhos  81     17 Jan 2021 05:43  TPro  6.2    /  Alb  2.3    /  TBili  0.5    /  DBili  x      /  AST  31     /  ALT  17     /  AlkPhos  103    16 Jan 2021 09:34  TPro  6.5    /  Alb  2.4    /  TBili  0.5    /  DBili  x      /  AST  33     /  ALT  20     /  AlkPhos  120    16 Jan 2021 00:03    PT/INR - ( 17 Jan 2021 05:43 )   PT: 33.8 sec;   INR: 3.05 ratio         PTT - ( 17 Jan 2021 05:43 )  PTT:38.6 sec      Blood Culture: Organism --  Gram Stain Blood -- Gram Stain --  Specimen Source .Blood Blood-Peripheral  Culture-Blood --            RADIOLOGY:    < from: CT Abdomen and Pelvis No Cont (01.16.21 @ 03:03) >    EXAM:  CT ABDOMEN AND PELVIS                          EXAM:  CT CHEST                            PROCEDURE DATE:  01/16/2021          INTERPRETATION:  CT of the chest, abdomen and pelvis    Indication: [Hypoxia], unresponsive, fever    Technique: Axial images were obtained from the thoracic inlet through pubic symphysis without oral or IV contrast. Reformatted coronal and sagittal images were submitted.    Comparison: CT of 1/5/21    FINDINGS:    Evaluation of the solid abdominal organs is limited without contrast.    A 1.2 cm left thyroid hypodensity.  The visualized axilla and subcutaneous tissues are unremarkable.    The tracheobronchial tree is patent centrally.  There is no mediastinal hematoma.  The great vessels are not enlarged.  There are nonspecific mediastinal lymph nodes are unchanged, measuring 1.3 x 1.5 cm in the left paratracheal region and is stable and 1.1 x 0.8 cm in the right paratracheal region.  Atherosclerotic calcifications of the thoracic aorta and coronary arteries.    The heart is not enlarged.  There is no pericardial effusion.    Lungs: Mild to moderate centrilobular emphysema.  Previously visualized right middle and lower lobe and left upper lobe opacities are significantly decreased compared to the previous study of January 5, 2021. There are mild patchy opacities at the lung bases.    Pleura: Trace right pleural effusion.  There is no free air or focal collection.  No free fluid.    Liver: Normal.  Spleen: Normal.  Gallbladder: Moderately distendedwith gallstones.  Biliary tree: Unremarkable.  Adrenal glands: Normal.  Pancreas: Normal.  Kidneys: No hydronephrosis or obstructing stones. Nonobstructive right renal stones measure up to 7 mm.    Bowel:  There is no small bowel obstruction.  There is moderate thickening of the sigmoid colon and rectum compatible with colitis. There is surrounding inflammation without free fluid.    Bladder: Decompressed.  Pelvic organs: No pelvic masses.    There is no significant adenopathy.  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 Moderate atherosclerotic vascular calcification. Right external iliac bypass graft.    Retroperitoneum: There is no mass.  Bones: The bones are diffusely osteopenic. The patient is status post ORIF involving the bilateral proximal femurs  Subcutaneous tissues: Unremarkable.    IMPRESSION:    Marked interval improvement in multifocal pneumonia.  Emphysema.    Compared to the recent CT of January 5, 2021, interval development of colitis of the sigmoid colon and rectum, of infectious or inflammatory etiology.    Redemonstration of gallstones.            ALISIA BERNARD MD; Attending Radiologist  This document has been electronically signed. Jan 16 2021  3:44AM    < end of copied text >    EKG:  af 106    [facility]         Dahiana Bañuelos, Evens, Toney, Terrie, Truman, Filiberto        [phone] (office)    CHIEF COMPLAINT: Patient is a 85y old  Female who presents with a chief complaint of AMS, lethargy (17 Jan 2021 09:12)      HPI: limited hx from patient who is somewhat lethargic and confused  85f well known to our office and sees Dr. Laughlin.  She has afib (on Coumadin), GIB, CVA and h/o intracranial aneurysm repair, infrarenal AAA, HTN, HLD, mitral valve prolapse, PVD of RLE with failed bypass, failed redo bypass and failed multiple stents, with persistent occlusion.  She has rheumatoid arthritis, s/p hysterectomy, and L cataract.  She is known to have a borderline lvef of 50 by echo done 1 week ago when she was hosp with pna and an acute abd process.  At that time she was more rapid af given that she was not taking her meds, and her meds were adjusted to include metop 12.5 q6 and dilt 240.  Metop was changed to xl 100 at [employer] [facility] [place] :[place] Seconds for hypoxia, fever and lethargy. She has been treated for COVID pna/resp failure and colitis. She is being treated with decadron for COVID, but her BRIDGET precludes her from receiving remdesivir.  GI/ID following for colitis and recently switched to cefepime and flagyl.  She has had worsened issues with hypoxia and hypotension, and both icu and cardiology were consulted this am.      She does not report any sig sxs now.  She does say her abdomen is sore.  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    Osteoarthritis    Afib    PVD (peripheral vascular disease)    Neuropathy  (Right lower leg)    H/O cerebral aneurysm repair  brain clips    CVA (cerebral vascular accident)  (&quot;Mini-stroke&quot;,1990&#x27;s)    Rheumatoid arthritis    Hyperlipidemia    Hypertension    S/P cataract surgery  (Left eye)    Elective surgery  (&quot;Twisted bowel&quot;, 2014)    Elective surgery  (Exision of cyst on liver, 1985)    S/P ORIF (open reduction internal fixation) fracture  Left hip fx (2012) &amp; R hip fx (2013)    H/O cerebral aneurysm repair  Brain clips (1978(    Renal stone  Cysto stent placement 10/1/2014    PVD (peripheral vascular disease)  s/p RLE bypass x 3, most recent 3/2012 Right external iliac to PT bypass w/ PTFE (2012)    S/P FRED (total abdominal hysterectomy)  (1987, Hx of &quot;ovarian cancer?&quot;)        SOCIAL HISTORY: No active tobacco, alcohol or illicit drug use    FAMILY HISTORY:  No pertinent family history in first degree relatives     No pertinent family history of CAD    Outpatient medications:  · 	acetaminophen 325 mg oral tablet: Last Dose Taken:  , 2 tab(s) orally every 6 hours, As needed, Mild Pain (1 - 3)  · 	dilTIAZem 240 mg/24 hours oral capsule, extended release: Last Dose Taken:  , 1 cap(s) orally once a day  · 	pantoprazole 40 mg oral delayed release tablet: Last Dose Taken:  , 1 tab(s) orally every 12 hours  · 	atorvastatin 40 mg oral tablet: Last Dose Taken:  , 1 tab(s) orally once a day (at bedtime)  · 	aspirin 81 mg oral delayed release tablet: Last Dose Taken:  , 1 tab(s) orally once a day  · 	metoprolol succinate 100 mg oral tablet, extended release: Last Dose Taken:  , 1 tab(s) orally once a day  · 	multivitamin: Last Dose Taken:  , 1 tab(s) orally once a day  · 	gabapentin 100 mg oral capsule: Last Dose Taken:  , 1 cap(s) orally 3 times a day  · 	Vitamin C 500 mg oral tablet: Last Dose Taken:  , 1 tab(s) orally once a day  · 	Xanax 0.25 mg oral tablet: Last Dose Taken:  , 1 tab(s) orally once a day (at bedtime)  · 	Coumadin 4 mg oral tablet: Last Dose Taken:  , 1 tab(s) orally once a day    MEDICATIONS  (STANDING):  ascorbic acid 500 milliGRAM(s) Oral daily  aspirin enteric coated 81 milliGRAM(s) Oral daily  atorvastatin 40 milliGRAM(s) Oral at bedtime  budesonide  80 MICROgram(s)/formoterol 4.5 MICROgram(s) Inhaler 2 Puff(s) Inhalation two times a day  cefepime   IVPB 1000 milliGRAM(s) IV Intermittent every 24 hours  cefepime   IVPB      dexAMETHasone  Injectable 6 milliGRAM(s) IV Push daily  diltiazem    milliGRAM(s) Oral daily  gabapentin 100 milliGRAM(s) Oral three times a day  lactobacillus acidophilus 1 Tablet(s) Oral three times a day  metoprolol tartrate 12.5 milliGRAM(s) Oral daily  metroNIDAZOLE  IVPB      metroNIDAZOLE  IVPB 500 milliGRAM(s) IV Intermittent every 8 hours  multivitamin 1 Tablet(s) Oral daily  pantoprazole    Tablet 40 milliGRAM(s) Oral every 12 hours  sodium chloride 0.9%. 1000 milliLiter(s) (60 mL/Hr) IV Continuous <Continuous>  sodium chloride 0.9%. 1000 milliLiter(s) (60 mL/Hr) IV Continuous <Continuous>  tiotropium 18 MICROgram(s) Capsule 1 Capsule(s) Inhalation daily    MEDICATIONS  (PRN):  acetaminophen   Tablet .. 650 milliGRAM(s) Oral every 6 hours PRN Temp greater or equal to 38C (100.4F), Mild Pain (1 - 3)  ALBUTerol    90 MICROgram(s) HFA Inhaler 2 Puff(s) Inhalation every 3 hours PRN Shortness of Breath and/or Wheezing  guaiFENesin   Syrup  (Sugar-Free) 200 milliGRAM(s) Oral every 6 hours PRN Cough      Allergies    No Known Allergies    Intolerances        REVIEW OF SYSTEMS: Is negative for eye, ENT, GI, , allergic, dermatologic, musculoskeletal and neurologic, except as described above.    VITAL SIGNS:   Vital Signs Last 24 Hrs  T(C): 36.4 (17 Jan 2021 04:48), Max: 36.4 (17 Jan 2021 04:48)  T(F): 97.5 (17 Jan 2021 04:48), Max: 97.5 (17 Jan 2021 04:48)  HR: 101 (17 Jan 2021 08:59) (68 - 101)  BP: 103/69 (17 Jan 2021 08:59) (88/60 - 103/69)  BP(mean): --  RR: 18 (17 Jan 2021 04:48) (17 - 18)  SpO2: 96% (17 Jan 2021 04:48) (96% - 98%)    I&O's Summary    16 Jan 2021 07:01  -  17 Jan 2021 07:00  --------------------------------------------------------  IN: 1150 mL / OUT: 250 mL / NET: 900 mL        PHYSICAL EXAM:    Constitutional: NAD, awake and alert, well-developed  Eyes:  EOMI, no oral cyanosis, conjunctivae clear, anicteric.  Pulmonary: Non-labored, breath sounds are clear bilaterally, no wheezing, rales or rhonchi  Cardiovascular:  irregular S1 and S2. No murmur.  No rubs, gallops or clicks  Gastrointestinal: Bowel Sounds present, soft, mild diffuse tenderness  Lymph: No peripheral edema.   Neurological: Alert, strength and sensitivity are grossly intact  Skin: No obvious lesions/rashes.   Psych:  Mood & affect appropriate .    LABS: All Labs Reviewed:                        10.7   18.13 )-----------( 225      ( 17 Jan 2021 05:43 )             34.5                         10.1   10.78 )-----------( 213      ( 16 Jan 2021 09:34 )             32.1                         11.2   11.27 )-----------( 189      ( 16 Jan 2021 00:03 )             34.6     17 Jan 2021 05:43    142    |  112    |  60     ----------------------------<  119    4.1     |  19     |  2.50   16 Jan 2021 09:34    142    |  112    |  44     ----------------------------<  111    3.5     |  20     |  1.90   16 Jan 2021 00:03    138    |  111    |  41     ----------------------------<  128    3.7     |  17     |  1.70     Ca    7.5        17 Jan 2021 05:43  Ca    7.7        16 Jan 2021 09:34  Ca    7.6        16 Jan 2021 00:03    TPro  5.9    /  Alb  2.1    /  TBili  0.3    /  DBili  x      /  AST  37     /  ALT  15     /  AlkPhos  81     17 Jan 2021 05:43  TPro  6.2    /  Alb  2.3    /  TBili  0.5    /  DBili  x      /  AST  31     /  ALT  17     /  AlkPhos  103    16 Jan 2021 09:34  TPro  6.5    /  Alb  2.4    /  TBili  0.5    /  DBili  x      /  AST  33     /  ALT  20     /  AlkPhos  120    16 Jan 2021 00:03    PT/INR - ( 17 Jan 2021 05:43 )   PT: 33.8 sec;   INR: 3.05 ratio         PTT - ( 17 Jan 2021 05:43 )  PTT:38.6 sec      Blood Culture: Organism --  Gram Stain Blood -- Gram Stain --  Specimen Source .Blood Blood-Peripheral  Culture-Blood --            RADIOLOGY:    < from: CT Abdomen and Pelvis No Cont (01.16.21 @ 03:03) >    EXAM:  CT ABDOMEN AND PELVIS                          EXAM:  CT CHEST                            PROCEDURE DATE:  01/16/2021          INTERPRETATION:  CT of the chest, abdomen and pelvis    Indication: [Hypoxia], unresponsive, fever    Technique: Axial images were obtained from the thoracic inlet through pubic symphysis without oral or IV contrast. Reformatted coronal and sagittal images were submitted.    Comparison: CT of 1/5/21    FINDINGS:    Evaluation of the solid abdominal organs is limited without contrast.    A 1.2 cm left thyroid hypodensity.  The visualized axilla and subcutaneous tissues are unremarkable.    The tracheobronchial tree is patent centrally.  There is no mediastinal hematoma.  The great vessels are not enlarged.  There are nonspecific mediastinal lymph nodes are unchanged, measuring 1.3 x 1.5 cm in the left paratracheal region and is stable and 1.1 x 0.8 cm in the right paratracheal region.  Atherosclerotic calcifications of the thoracic aorta and coronary arteries.    The heart is not enlarged.  There is no pericardial effusion.    Lungs: Mild to moderate centrilobular emphysema.  Previously visualized right middle and lower lobe and left upper lobe opacities are significantly decreased compared to the previous study of January 5, 2021. There are mild patchy opacities at the lung bases.    Pleura: Trace right pleural effusion.  There is no free air or focal collection.  No free fluid.    Liver: Normal.  Spleen: Normal.  Gallbladder: Moderately distendedwith gallstones.  Biliary tree: Unremarkable.  Adrenal glands: Normal.  Pancreas: Normal.  Kidneys: No hydronephrosis or obstructing stones. Nonobstructive right renal stones measure up to 7 mm.    Bowel:  There is no small bowel obstruction.  There is moderate thickening of the sigmoid colon and rectum compatible with colitis. There is surrounding inflammation without free fluid.    Bladder: Decompressed.  Pelvic organs: No pelvic masses.    There is no significant adenopathy.  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 Moderate atherosclerotic vascular calcification. Right external iliac bypass graft.    Retroperitoneum: There is no mass.  Bones: The bones are diffusely osteopenic. The patient is status post ORIF involving the bilateral proximal femurs  Subcutaneous tissues: Unremarkable.    IMPRESSION:    Marked interval improvement in multifocal pneumonia.  Emphysema.    Compared to the recent CT of January 5, 2021, interval development of colitis of the sigmoid colon and rectum, of infectious or inflammatory etiology.    Redemonstration of gallstones.            ALISIA BERNARD MD; Attending Radiologist  This document has been electronically signed. Jan 16 2021  3:44AM    < end of copied text >    EKG:  af 106       < from: TTE Echo Complete w/o Contrast w/ Doppler (01.06.21 @ 16:57) >     EXAM:  ECHO TTE WO CON COMP W DOPP         PROCEDURE DATE:  01/06/2021        INTERPRETATION:  INDICATION: Palpitations  Sonographer TH    Blood Pressure 149/83    Height 154 cm     Weight 63 kg       BSA 1.6 sq m    Dimensions:  LA 4.4       Normal Values: 2.0 - 4.0 cm  Ao 2.6        Normal Values: 2.0 - 3.8 cm  SEPTUM 0.7       Normal Values: 0.6 - 1.2 cm  PWT 0.8       Normal Values: 0.6 - 1.1 cm  LVIDd 4.8         Normal Values: 3.0 - 5.6 cm  LVIDs 3.3         Normal Values: 1.8 - 4.0 cm      OBSERVATIONS:  Technically difficult study  Mitral Valve: Thickened leaflets, moderate MR.  Aortic Valve/Aorta: Sclerotic trileaflet aortic valve with normal opening. Mild AI  Tricuspid Valve: normal with trace TR.  Pulmonic Valve: Mild PI  Left Atrium: Enlarged  Right Atrium: Not well-visualized  Left Ventricle: Low-normal left ventricular systolic function, estimated LVEF of 50%.  Right Ventricle: Grossly normal size and systolic function.  Pericardium/Pleura: normal, no significant pericardial effusion.  Pulmonary/RV Pressure: estimated PA systolic pressure of 29 mmHg      Conclusion:  Technically difficult study  Low-normal left ventricular systolic function, estimated LVEF of 50%.  Grossly normal RV size and systolic function.  Left atrial enlargement  Sclerotic trileaflet aortic valve, mild AI.  Moderate MR  Mild TR.              JACUQELINE GOODEN MD; Attending Cardiologist  This document has been electronically signed. Jan 7 2021  5:51PM    < end of copied text >

## 2021-01-17 NOTE — PROGRESS NOTE ADULT - ASSESSMENT
84yo F, with PMH afib (on Coumadin), CVA and h/o aneurysm repair, infrarenal AAA, HTN, HLD, mitral valve prolapse, PVD of RLE s/p stents x3, rheumatoid arthritis, s/p hysterectomy, and L cataract, BIBEMS from [facility] for hypoxia, fever and lethargy. Admitted for acute hypoxic respiratory failure 2/2 confirmed COVID-19 infection and colitis.

## 2021-01-17 NOTE — PROGRESS NOTE ADULT - ASSESSMENT
BRIDGET: Prerenal azotemia, ATN  COVID +, Pneumonia  Colitis  Hypertension now with hypotension    Continue gentle IV hydration. Strict i & O. Bateman placement if unable to document out put. Rx for COVID.   Avoid nephrotoxic meds as possible. Monitor BP trend. Lower diltiazem dose if able. Will follow electrolytes and renal function trend.

## 2021-01-17 NOTE — CONSULT NOTE ADULT - ASSESSMENT
85 F with a significant PMH afib (on Coumadin), CVA and h/o aneurysm repair, infrarenal AAA, HTN, HLD, mitral valve prolapse, PVD of RLE s/p stents x3, rheumatoid arthritis, s/p hysterectomy, L cataract and recent hospitalization for MF PNA here at [place] who is admitted with COVID-19 pneumonia and colitis.    Covid-19  Hypoxia  Sigmoid Colitis    - Patient not in respiratory distress  - Saturations in the high 90's on 4L NC  - No accessory muscle usage  - Able to converse appropriately although needed slight reorientation  - Hemodynamically stable  - Aggressive Chest PT  - c/w decadron  - c/w abx per ID and primary team  - incentive spirometry 10/h while awake  - Fever control   - Strict I&Os  - Scr rising, UOP preserved, Avoid nephrotoxic agents  - WBC 18 from 10, procal 1.02, continue to trend  - Bcx - x 2, U legionella neg  - Care per primary team.  - Patient not an ICU candidate at this time. Please reconsult as necessary.  - Case discussed and plan formulated with ICU attending Dr. Roberto.

## 2021-01-17 NOTE — CONSULT NOTE ADULT - SUBJECTIVE AND OBJECTIVE BOX
Reason for consultation: Persistent hypoxia and increased WOB    HPI:  This is a 85 F with a significant PMH afib (on Coumadin), CVA and h/o aneurysm repair, infrarenal AAA, HTN, HLD, mitral valve prolapse, PVD of RLE s/p stents x3, rheumatoid arthritis, s/p hysterectomy, L cataract and recent hospitalization for MF PNA here at [place] who is admitted with COVID-19 pneumonia and colitis.   She was BIBEMS from [facility] for hypoxia, fever and lethargy. Pt was found to be saturating 87% on RA and temperature of 101 F.  Patient was found to be COVID + and with sigmoid colitis.  She is being treated with decadron for COVID, but her BRIDGET precludes her from receiving remdesivir.  GI/ID following for colitis and recently switched to cefepime and flagyl.      ICU consulted for persistent hypoxia and increased WOB.          Subjective:    Review of Systems         Unable to obtain due to: intubated & sedated altered mental status _______________    Constitutional: denies fever, chills, general malaise, fatigue, weight loss, weight gain, diaphoresis   HEENT: denies dry mouth, sore throat, runny nose, photophobia, blurry vision, double vision, discharge, eye pain, difficulty hearing, vertigo, dysphagia, epistaxis, recent dental work    Neck: denies pain or stiffness  Respiratory: denies SOB, CRABTREE, cough, phlegm, wheezing, hemoptysis  Cardiovascular: denies CP, palpitations, edema, diaphoresis   Gastrointestinal: denies nausea, vomiting or hematemesis, diarrhea, constipation, abdominal or epigastric pain, melena or hematochezia   Genitourinary: denies dysuria, frequency, urgency, incontinence, hematuria   Skin: denies rash, hives, itching, burning, masses  Musculoskeletal: denies myalgias, arthritis, joint swelling, muscle weakness  Neurologic: denies syncope, LOC, headache, weakness, dizziness, parasthesias, numbness, seizures, confusion, dementia   Psychiatric: denies feeling anxious, depressed, suicidal, homicidal  Endocrine: denies increased fingerstick glucoses, cold or heat intolerance, polydipsia, polyuria, polyphagia, hair loss  Hematology/Oncology: denies bruising, tender or enlarged lymph nodes   Allergy/immunologic: denies hives or eczema  ROS negative x 10 systems except as noted above      Patient is a 85y old  Female who presents with a chief complaint of AMS, lethargy (17 Jan 2021 09:12)    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    Osteoarthritis    Afib    PVD (peripheral vascular disease)    Neuropathy  (Right lower leg)    H/O cerebral aneurysm repair  brain clips    CVA (cerebral vascular accident)  (&quot;Mini-stroke&quot;,1990&#x27;s)    Rheumatoid arthritis    Hyperlipidemia    Hypertension    S/P cataract surgery  (Left eye)    Elective surgery  (&quot;Twisted bowel&quot;, 2014)    Elective surgery  (Exision of cyst on liver, 1985)    S/P ORIF (open reduction internal fixation) fracture  Left hip fx (2012) &amp; R hip fx (2013)    H/O cerebral aneurysm repair  Brain clips (1978(    Renal stone  Cysto stent placement 10/1/2014    PVD (peripheral vascular disease)  s/p RLE bypass x 3, most recent 3/2012 Right external iliac to PT bypass w/ PTFE (2012)    S/P FRED (total abdominal hysterectomy)  (1987, Hx of &quot;ovarian cancer?&quot;)      FAMILY HISTORY:  No pertinent family history in first degree relatives        Vitals   ICU Vital Signs Last 24 Hrs  T(C): 36.4 (17 Jan 2021 04:48), Max: 36.4 (17 Jan 2021 04:48)  T(F): 97.5 (17 Jan 2021 04:48), Max: 97.5 (17 Jan 2021 04:48)  HR: 101 (17 Jan 2021 08:59) (68 - 101)  BP: 103/69 (17 Jan 2021 08:59) (88/60 - 103/69)  BP(mean): --  ABP: --  ABP(mean): --  RR: 18 (17 Jan 2021 04:48) (17 - 18)  SpO2: 96% (17 Jan 2021 04:48) (96% - 98%)      Physical Exam:   Constitutional: NAD, well-groomed, well-developed  HEENT: PERRLA, EOMI, no drainage or redness  Neck: supple,  No JVD, Trachea midline  Back: Normal spine flexure, No CVA tenderness, No deformity or limitation of movement  Respiratory: Breath Sounds equal & clear bilaterally to auscultation, no accessory muscle use noted  Cardiovascular: Regular rate, regular rhythm, normal S1, S2; no murmurs or rub  Gastrointestinal: Soft, non-tender, non distended, no hepatosplenomegaly, normal bowel sounds  Extremities: KAUR x 4, no peripheral edema, no cyanosis, no clubbing   Vascular: Equal and normal pulses: 2+ peripheral pulses throughout  Neurological: A+O x 3; speech clear and intact; no sensory, motor  deficits, normal reflexes  Psychiatric: calm, normal mood, normal affect  Musculoskeletal: No joint swelling or deformity; no limitation of movement  Skin: warm, dry, well perfused, no rashes    VENT SETTINGS         I&O's Detail    16 Jan 2021 07:01  -  17 Jan 2021 07:00  --------------------------------------------------------  IN:    IV PiggyBack: 150 mL    sodium chloride 0.9%: 1000 mL  Total IN: 1150 mL    OUT:    Voided (mL): 250 mL  Total OUT: 250 mL    Total NET: 900 mL          LABS                        10.7   18.13 )-----------( 225      ( 17 Jan 2021 05:43 )             34.5     01-17    142  |  112<H>  |  60<H>  ----------------------------<  119<H>  4.1   |  19<L>  |  2.50<H>    Ca    7.5<L>      17 Jan 2021 05:43    TPro  5.9<L>  /  Alb  2.1<L>  /  TBili  0.3  /  DBili  x   /  AST  37  /  ALT  15  /  AlkPhos  81  01-17    LIVER FUNCTIONS - ( 17 Jan 2021 05:43 )  Alb: 2.1 g/dL / Pro: 5.9 g/dL / ALK PHOS: 81 U/L / ALT: 15 U/L / AST: 37 U/L / GGT: x           PT/INR - ( 17 Jan 2021 05:43 )   PT: 33.8 sec;   INR: 3.05 ratio         PTT - ( 17 Jan 2021 05:43 )  PTT:38.6 sec                MEDICATIONS  (STANDING):  ascorbic acid 500 milliGRAM(s) Oral daily  aspirin enteric coated 81 milliGRAM(s) Oral daily  atorvastatin 40 milliGRAM(s) Oral at bedtime  budesonide  80 MICROgram(s)/formoterol 4.5 MICROgram(s) Inhaler 2 Puff(s) Inhalation two times a day  cefepime   IVPB      cefepime   IVPB 1000 milliGRAM(s) IV Intermittent every 24 hours  dexAMETHasone  Injectable 6 milliGRAM(s) IV Push daily  diltiazem    milliGRAM(s) Oral daily  gabapentin 100 milliGRAM(s) Oral three times a day  lactobacillus acidophilus 1 Tablet(s) Oral three times a day  metoprolol succinate  milliGRAM(s) Oral daily  metroNIDAZOLE  IVPB      metroNIDAZOLE  IVPB 500 milliGRAM(s) IV Intermittent every 8 hours  multivitamin 1 Tablet(s) Oral daily  pantoprazole    Tablet 40 milliGRAM(s) Oral every 12 hours  sodium chloride 0.9%. 1000 milliLiter(s) (60 mL/Hr) IV Continuous <Continuous>  sodium chloride 0.9%. 1000 milliLiter(s) (60 mL/Hr) IV Continuous <Continuous>  tiotropium 18 MICROgram(s) Capsule 1 Capsule(s) Inhalation daily    MEDICATIONS  (PRN):  acetaminophen   Tablet .. 650 milliGRAM(s) Oral every 6 hours PRN Temp greater or equal to 38C (100.4F), Mild Pain (1 - 3)  ALBUTerol    90 MICROgram(s) HFA Inhaler 2 Puff(s) Inhalation every 3 hours PRN Shortness of Breath and/or Wheezing  guaiFENesin   Syrup  (Sugar-Free) 200 milliGRAM(s) Oral every 6 hours PRN Cough      Allergies:  No Known Allergies        CRITICAL CARE TIME SPENT:  (Assessing presenting problems of acute illness, which pose high probability of life threatening deterioration or end organ damage/dysfunction, as well as medical decision making including initiating plan of care, reviewing data, reviewing radiologic exams, discussing with multidisciplinary team,  discussing goals of care with patient/family, and writing this note.  Non-inclusive of procedures performed)         Reason for consultation: Persistent hypoxia and increased WOB    HPI:  This is a 85 F with a significant PMH afib (on Coumadin), CVA and h/o aneurysm repair, infrarenal AAA, HTN, HLD, mitral valve prolapse, PVD of RLE s/p stents x3, rheumatoid arthritis, s/p hysterectomy, L cataract and recent hospitalization for MF PNA here at [place] who is admitted with COVID-19 pneumonia and colitis.   She was BIBEMS from [facility] for hypoxia, fever and lethargy. Pt was found to be saturating 87% on RA and temperature of 101 F.  Patient was found to be COVID + and with sigmoid colitis.  She is being treated with decadron for COVID, but her BRIDGET precludes her from receiving remdesivir.  GI/ID following for colitis and recently switched to cefepime and flagyl.      ICU consulted for persistent hypoxia and increased WOB.          Subjective:    Review of Systems         Constitutional: Malaise, fatigue.  denies fever, chills,weight loss, weight gain, diaphoresis   HEENT: denies dry mouth, sore throat, runny nose, photophobia, blurry vision, double vision, discharge, eye pain, difficulty hearing, vertigo, dysphagia, epistaxis, recent dental work    Neck: denies pain or stiffness  Respiratory: +cough denies SOB, CRABTREE, phlegm, wheezing, hemoptysis  Cardiovascular: denies CP, palpitations, edema, diaphoresis   Gastrointestinal: denies nausea, vomiting or hematemesis, diarrhea, constipation, abdominal or epigastric pain, melena or hematochezia   Genitourinary: denies dysuria, frequency, urgency, incontinence, hematuria   Skin: denies rash, hives, itching, burning, masses  Musculoskeletal: denies myalgias, arthritis, joint swelling, muscle weakness  Neurologic: denies syncope, LOC, headache, weakness, dizziness, parasthesias, numbness, seizures, confusion, dementia   Psychiatric: denies feeling anxious, depressed, suicidal, homicidal  Endocrine: denies increased fingerstick glucoses, cold or heat intolerance, polydipsia, polyuria, polyphagia, hair loss  Hematology/Oncology: denies bruising, tender or enlarged lymph nodes   Allergy/immunologic: denies hives or eczema  ROS negative x 10 systems except as noted above      Patient is a 85y old  Female who presents with a chief complaint of AMS, lethargy (17 Jan 2021 09:12)    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    Osteoarthritis    Afib    PVD (peripheral vascular disease)    Neuropathy  (Right lower leg)    H/O cerebral aneurysm repair  brain clips    CVA (cerebral vascular accident)  (&quot;Mini-stroke&quot;,1990&#x27;s)    Rheumatoid arthritis    Hyperlipidemia    Hypertension    S/P cataract surgery  (Left eye)    Elective surgery  (&quot;Twisted bowel&quot;, 2014)    Elective surgery  (Exision of cyst on liver, 1985)    S/P ORIF (open reduction internal fixation) fracture  Left hip fx (2012) &amp; R hip fx (2013)    H/O cerebral aneurysm repair  Brain clips (1978(    Renal stone  Cysto stent placement 10/1/2014    PVD (peripheral vascular disease)  s/p RLE bypass x 3, most recent 3/2012 Right external iliac to PT bypass w/ PTFE (2012)    S/P FRED (total abdominal hysterectomy)  (1987, Hx of &quot;ovarian cancer?&quot;)      FAMILY HISTORY:  No pertinent family history in first degree relatives        Vitals   ICU Vital Signs Last 24 Hrs  T(C): 36.4 (17 Jan 2021 04:48), Max: 36.4 (17 Jan 2021 04:48)  T(F): 97.5 (17 Jan 2021 04:48), Max: 97.5 (17 Jan 2021 04:48)  HR: 101 (17 Jan 2021 08:59) (68 - 101)  BP: 103/69 (17 Jan 2021 08:59) (88/60 - 103/69)  BP(mean): --  ABP: --  ABP(mean): --  RR: 18 (17 Jan 2021 04:48) (17 - 18)  SpO2: 96% (17 Jan 2021 04:48) (96% - 98%)      Physical Exam:   Constitutional: NAD, well-groomed, well-developed  HEENT: PERRLA, EOMI, no drainage or redness  Neck: supple,  No JVD, Trachea midline  Back: Normal spine flexure, No CVA tenderness, No deformity or limitation of movement  Respiratory: Breath Sounds equal & clear bilaterally to auscultation, no accessory muscle use noted  Cardiovascular: Regular rate, irregular rhythm, normal S1, S2; no murmurs or rub  Gastrointestinal: Soft, non-tender, non distended, no hepatosplenomegaly, normal bowel sounds  Extremities: KAUR x 4, no peripheral edema, no cyanosis, no clubbing   Vascular: Equal and normal pulses: 2+ peripheral pulses throughout  Neurological: A+O x 2-3  Psychiatric: calm  Musculoskeletal: No joint swelling or deformity; no limitation of movement  Skin: warm, dry, well perfused, no rashes    VENT SETTINGS         I&O's Detail    16 Jan 2021 07:01  -  17 Jan 2021 07:00  --------------------------------------------------------  IN:    IV PiggyBack: 150 mL    sodium chloride 0.9%: 1000 mL  Total IN: 1150 mL    OUT:    Voided (mL): 250 mL  Total OUT: 250 mL    Total NET: 900 mL          LABS                        10.7   18.13 )-----------( 225      ( 17 Jan 2021 05:43 )             34.5     01-17    142  |  112<H>  |  60<H>  ----------------------------<  119<H>  4.1   |  19<L>  |  2.50<H>    Ca    7.5<L>      17 Jan 2021 05:43    TPro  5.9<L>  /  Alb  2.1<L>  /  TBili  0.3  /  DBili  x   /  AST  37  /  ALT  15  /  AlkPhos  81  01-17    LIVER FUNCTIONS - ( 17 Jan 2021 05:43 )  Alb: 2.1 g/dL / Pro: 5.9 g/dL / ALK PHOS: 81 U/L / ALT: 15 U/L / AST: 37 U/L / GGT: x           PT/INR - ( 17 Jan 2021 05:43 )   PT: 33.8 sec;   INR: 3.05 ratio         PTT - ( 17 Jan 2021 05:43 )  PTT:38.6 sec                MEDICATIONS  (STANDING):  ascorbic acid 500 milliGRAM(s) Oral daily  aspirin enteric coated 81 milliGRAM(s) Oral daily  atorvastatin 40 milliGRAM(s) Oral at bedtime  budesonide  80 MICROgram(s)/formoterol 4.5 MICROgram(s) Inhaler 2 Puff(s) Inhalation two times a day  cefepime   IVPB      cefepime   IVPB 1000 milliGRAM(s) IV Intermittent every 24 hours  dexAMETHasone  Injectable 6 milliGRAM(s) IV Push daily  diltiazem    milliGRAM(s) Oral daily  gabapentin 100 milliGRAM(s) Oral three times a day  lactobacillus acidophilus 1 Tablet(s) Oral three times a day  metoprolol succinate  milliGRAM(s) Oral daily  metroNIDAZOLE  IVPB      metroNIDAZOLE  IVPB 500 milliGRAM(s) IV Intermittent every 8 hours  multivitamin 1 Tablet(s) Oral daily  pantoprazole    Tablet 40 milliGRAM(s) Oral every 12 hours  sodium chloride 0.9%. 1000 milliLiter(s) (60 mL/Hr) IV Continuous <Continuous>  sodium chloride 0.9%. 1000 milliLiter(s) (60 mL/Hr) IV Continuous <Continuous>  tiotropium 18 MICROgram(s) Capsule 1 Capsule(s) Inhalation daily    MEDICATIONS  (PRN):  acetaminophen   Tablet .. 650 milliGRAM(s) Oral every 6 hours PRN Temp greater or equal to 38C (100.4F), Mild Pain (1 - 3)  ALBUTerol    90 MICROgram(s) HFA Inhaler 2 Puff(s) Inhalation every 3 hours PRN Shortness of Breath and/or Wheezing  guaiFENesin   Syrup  (Sugar-Free) 200 milliGRAM(s) Oral every 6 hours PRN Cough      Allergies:  No Known Allergies

## 2021-01-17 NOTE — PROGRESS NOTE ADULT - SUBJECTIVE AND OBJECTIVE BOX
Patient is a 85y old  Female who presents with a chief complaint of AMS, lethargy (17 Jan 2021 09:12)  Patient seen in follow up for BRIDGET.     Events noted. Poor PO intake.        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    Osteoarthritis    Afib    PVD (peripheral vascular disease)    Neuropathy    Gout    H/O cerebral aneurysm repair    CVA (cerebral vascular accident)    Rheumatoid arthritis    Asthma    Hyperlipidemia    Hypertension      MEDICATIONS  (STANDING):  ascorbic acid 500 milliGRAM(s) Oral daily  aspirin enteric coated 81 milliGRAM(s) Oral daily  atorvastatin 40 milliGRAM(s) Oral at bedtime  budesonide  80 MICROgram(s)/formoterol 4.5 MICROgram(s) Inhaler 2 Puff(s) Inhalation two times a day  cefepime   IVPB 1000 milliGRAM(s) IV Intermittent every 24 hours  cefepime   IVPB      dexAMETHasone  Injectable 6 milliGRAM(s) IV Push daily  diltiazem    milliGRAM(s) Oral daily  gabapentin 100 milliGRAM(s) Oral three times a day  lactobacillus acidophilus 1 Tablet(s) Oral three times a day  metoprolol tartrate 12.5 milliGRAM(s) Oral daily  metroNIDAZOLE  IVPB      metroNIDAZOLE  IVPB 500 milliGRAM(s) IV Intermittent every 8 hours  multivitamin 1 Tablet(s) Oral daily  pantoprazole    Tablet 40 milliGRAM(s) Oral every 12 hours  sodium chloride 0.9%. 1000 milliLiter(s) (60 mL/Hr) IV Continuous <Continuous>  sodium chloride 0.9%. 1000 milliLiter(s) (60 mL/Hr) IV Continuous <Continuous>  tiotropium 18 MICROgram(s) Capsule 1 Capsule(s) Inhalation daily    MEDICATIONS  (PRN):  acetaminophen   Tablet .. 650 milliGRAM(s) Oral every 6 hours PRN Temp greater or equal to 38C (100.4F), Mild Pain (1 - 3)  ALBUTerol    90 MICROgram(s) HFA Inhaler 2 Puff(s) Inhalation every 3 hours PRN Shortness of Breath and/or Wheezing  guaiFENesin   Syrup  (Sugar-Free) 200 milliGRAM(s) Oral every 6 hours PRN Cough    T(C): 36.4 (01-17-21 @ 04:48), Max: 37.7 (01-16-21 @ 05:00)  HR: 101 (01-17-21 @ 08:59) (68 - 101)  BP: 103/69 (01-17-21 @ 08:59) (88/60 - 106/71)  RR: 18 (01-17-21 @ 04:48) (17 - 18)  SpO2: 96% (01-17-21 @ 04:48) (96% - 99%)  Wt(kg): --  I&O's Detail    16 Jan 2021 07:01  -  17 Jan 2021 07:00  --------------------------------------------------------  IN:    IV PiggyBack: 150 mL    sodium chloride 0.9%: 1000 mL  Total IN: 1150 mL    OUT:    Voided (mL): 250 mL  Total OUT: 250 mL    Total NET: 900 mL          PHYSICAL EXAM:  General: No distress  Respiratory: b/l air entry  Cardiovascular: S1 S2  Gastrointestinal: soft, + tenderness  Extremities:  no edema                              10.7   18.13 )-----------( 225      ( 17 Jan 2021 05:43 )             34.5     01-17    142  |  112<H>  |  60<H>  ----------------------------<  119<H>  4.1   |  19<L>  |  2.50<H>    Ca    7.5<L>      17 Jan 2021 05:43    TPro  5.9<L>  /  Alb  2.1<L>  /  TBili  0.3  /  DBili  x   /  AST  37  /  ALT  15  /  AlkPhos  81  01-17        LIVER FUNCTIONS - ( 17 Jan 2021 05:43 )  Alb: 2.1 g/dL / Pro: 5.9 g/dL / ALK PHOS: 81 U/L / ALT: 15 U/L / AST: 37 U/L / GGT: x               Sodium, Serum: 142 (01-17 @ 05:43)  Sodium, Serum: 142 (01-16 @ 09:34)  Sodium, Serum: 138 (01-16 @ 00:03)    Creatinine, Serum: 2.50 (01-17 @ 05:43)  Creatinine, Serum: 1.90 (01-16 @ 09:34)  Creatinine, Serum: 1.70 (01-16 @ 00:03)    Potassium, Serum: 4.1 (01-17 @ 05:43)  Potassium, Serum: 3.5 (01-16 @ 09:34)  Potassium, Serum: 3.7 (01-16 @ 00:03)    Hemoglobin: 10.7 (01-17 @ 05:43)  Hemoglobin: 10.1 (01-16 @ 09:34)  Hemoglobin: 11.2 (01-16 @ 00:03)

## 2021-01-17 NOTE — PROGRESS NOTE ADULT - SUBJECTIVE AND OBJECTIVE BOX
Patient is a 85y old  Female who presents with a chief complaint of AMS, lethargy (17 Jan 2021 08:13)       INTERVAL HPI/OVERNIGHT EVENTS: Patient seen and examined at bedside. C/o generalized weakness. Denies chest pain, palpitation     MEDICATIONS  (STANDING):  ascorbic acid 500 milliGRAM(s) Oral daily  aspirin enteric coated 81 milliGRAM(s) Oral daily  atorvastatin 40 milliGRAM(s) Oral at bedtime  budesonide  80 MICROgram(s)/formoterol 4.5 MICROgram(s) Inhaler 2 Puff(s) Inhalation two times a day  cefepime   IVPB      cefepime   IVPB 1000 milliGRAM(s) IV Intermittent every 24 hours  dexAMETHasone  Injectable 6 milliGRAM(s) IV Push daily  diltiazem    milliGRAM(s) Oral daily  gabapentin 100 milliGRAM(s) Oral three times a day  lactobacillus acidophilus 1 Tablet(s) Oral three times a day  metoprolol succinate  milliGRAM(s) Oral daily  metroNIDAZOLE  IVPB      metroNIDAZOLE  IVPB 500 milliGRAM(s) IV Intermittent every 8 hours  multivitamin 1 Tablet(s) Oral daily  pantoprazole    Tablet 40 milliGRAM(s) Oral every 12 hours  sodium chloride 0.9%. 1000 milliLiter(s) (60 mL/Hr) IV Continuous <Continuous>  sodium chloride 0.9%. 1000 milliLiter(s) (60 mL/Hr) IV Continuous <Continuous>  tiotropium 18 MICROgram(s) Capsule 1 Capsule(s) Inhalation daily    MEDICATIONS  (PRN):  acetaminophen   Tablet .. 650 milliGRAM(s) Oral every 6 hours PRN Temp greater or equal to 38C (100.4F), Mild Pain (1 - 3)  ALBUTerol    90 MICROgram(s) HFA Inhaler 2 Puff(s) Inhalation every 3 hours PRN Shortness of Breath and/or Wheezing  guaiFENesin   Syrup  (Sugar-Free) 200 milliGRAM(s) Oral every 6 hours PRN Cough      Allergies    No Known Allergies    Intolerances        REVIEW OF SYSTEMS:  CONSTITUTIONAL: No fever, + Gen Weakness   EYES: No eye pain, visual disturbances, or discharge  ENMT:  No difficulty hearing, tinnitus, vertigo; No sinus or throat pain  NECK: No pain or stiffness  RESPIRATORY: No cough, wheezing, chills or hemoptysis; + shortness of breath  CARDIOVASCULAR: No chest pain, palpitations, dizziness, or leg swelling  GASTROINTESTINAL: No abdominal or epigastric pain. No nausea, vomiting, or hematemesis; No diarrhea or constipation.   GENITOURINARY: No dysuria, frequency, hematuria, or incontinence  NEUROLOGICAL: No headaches, memory loss, loss of strength, numbness, or tremors  SKIN: No itching, burning, rashes, or lesions   MUSCULOSKELETAL: No joint pain or swelling; No muscle, back, or extremity pain  HEME/LYMPH: No easy bruising, or bleeding gums  ALLERGY AND IMMUNOLOGIC: No hives or eczema    Vital Signs Last 24 Hrs  T(C): 36.4 (17 Jan 2021 04:48), Max: 36.4 (17 Jan 2021 04:48)  T(F): 97.5 (17 Jan 2021 04:48), Max: 97.5 (17 Jan 2021 04:48)  HR: 93 (17 Jan 2021 04:48) (68 - 93)  BP: 88/60 (17 Jan 2021 04:48) (88/60 - 94/64)  BP(mean): --  RR: 18 (17 Jan 2021 04:48) (17 - 18)  SpO2: 96% (17 Jan 2021 04:48) (96% - 98%)    PHYSICAL EXAM:  GENERAL: NAD, Awake, Alert   HEAD:  Atraumatic, Normocephalic  EYES: EOMI, PERRLA, conjunctiva and sclera clear  ENMT: No tonsillar erythema, exudates, or enlargement; Moist mucous membranes  NECK: Supple, No JVD, Normal thyroid  NERVOUS SYSTEM:  Alert & Awake, No focal motor/sensory deficits   CHEST/LUNG: Decreased to auscultation bilaterally; No rales, rhonchi, wheezing  HEART: S1S2+,  Regular rate and rhythm  ABDOMEN: Soft,  Nondistended; Bowel sounds present  EXTREMITIES:  2+ Peripheral Pulses, No clubbing, cyanosis  LYMPH: No lymphadenopathy noted  SKIN: No rashes, warm, dry     LABS:                        10.7   18.13 )-----------( 225      ( 17 Jan 2021 05:43 )             34.5     17 Jan 2021 05:43    142    |  112    |  60     ----------------------------<  119    4.1     |  19     |  2.50     Ca    7.5        17 Jan 2021 05:43    TPro  5.9    /  Alb  2.1    /  TBili  0.3    /  DBili  x      /  AST  37     /  ALT  15     /  AlkPhos  81     17 Jan 2021 05:43    PT/INR - ( 17 Jan 2021 05:43 )   PT: 33.8 sec;   INR: 3.05 ratio         PTT - ( 17 Jan 2021 05:43 )  PTT:38.6 sec  CAPILLARY BLOOD GLUCOSE        BLOOD CULTURE  01-16 @ 05:01   No growth to date.  --  --    RADIOLOGY & ADDITIONAL TESTS:    Imaging Personally Reviewed:  [ ] YES     Consultant(s) Notes Reviewed:      Care Discussed with Consultants/Other Providers:

## 2021-01-17 NOTE — PROGRESS NOTE ADULT - SUBJECTIVE AND OBJECTIVE BOX
INTERVAL HPI/OVERNIGHT EVENTS:  chart reviewed, on nc  hypotensive overnight  no diarrhea, gib, or c/o abd pain per dw nursing  labs noted    MEDICATIONS  (STANDING):  ascorbic acid 500 milliGRAM(s) Oral daily  aspirin enteric coated 81 milliGRAM(s) Oral daily  atorvastatin 40 milliGRAM(s) Oral at bedtime  budesonide  80 MICROgram(s)/formoterol 4.5 MICROgram(s) Inhaler 2 Puff(s) Inhalation two times a day  cefepime   IVPB      cefepime   IVPB 1000 milliGRAM(s) IV Intermittent every 24 hours  dexAMETHasone  Injectable 6 milliGRAM(s) IV Push daily  diltiazem    milliGRAM(s) Oral daily  gabapentin 100 milliGRAM(s) Oral three times a day  lactobacillus acidophilus 1 Tablet(s) Oral three times a day  metoprolol succinate  milliGRAM(s) Oral daily  metroNIDAZOLE  IVPB      metroNIDAZOLE  IVPB 500 milliGRAM(s) IV Intermittent every 8 hours  multivitamin 1 Tablet(s) Oral daily  pantoprazole    Tablet 40 milliGRAM(s) Oral every 12 hours  sodium chloride 0.9%. 1000 milliLiter(s) (60 mL/Hr) IV Continuous <Continuous>  tiotropium 18 MICROgram(s) Capsule 1 Capsule(s) Inhalation daily    MEDICATIONS  (PRN):  acetaminophen   Tablet .. 650 milliGRAM(s) Oral every 6 hours PRN Temp greater or equal to 38C (100.4F), Mild Pain (1 - 3)  ALBUTerol    90 MICROgram(s) HFA Inhaler 2 Puff(s) Inhalation every 3 hours PRN Shortness of Breath and/or Wheezing  guaiFENesin   Syrup  (Sugar-Free) 200 milliGRAM(s) Oral every 6 hours PRN Cough      Allergies    No Known Allergies    Intolerances        Review of Systems:    tele f/u      Vital Signs Last 24 Hrs  T(C): 36.4 (17 Jan 2021 04:48), Max: 36.4 (17 Jan 2021 04:48)  T(F): 97.5 (17 Jan 2021 04:48), Max: 97.5 (17 Jan 2021 04:48)  HR: 93 (17 Jan 2021 04:48) (68 - 93)  BP: 88/60 (17 Jan 2021 04:48) (88/60 - 94/64)  BP(mean): --  RR: 18 (17 Jan 2021 04:48) (17 - 18)  SpO2: 96% (17 Jan 2021 04:48) (96% - 98%)    PHYSICAL EXAM:  tele f/u      LABS:                        10.7   18.13 )-----------( 225      ( 17 Jan 2021 05:43 )             34.5     01-17    142  |  112<H>  |  60<H>  ----------------------------<  119<H>  4.1   |  19<L>  |  2.50<H>    Ca    7.5<L>      17 Jan 2021 05:43    TPro  5.9<L>  /  Alb  2.1<L>  /  TBili  0.3  /  DBili  x   /  AST  37  /  ALT  15  /  AlkPhos  81  01-17    PT/INR - ( 17 Jan 2021 05:43 )   PT: 33.8 sec;   INR: 3.05 ratio         PTT - ( 17 Jan 2021 05:43 )  PTT:38.6 sec      RADIOLOGY & ADDITIONAL TESTS:

## 2021-01-17 NOTE — PROGRESS NOTE ADULT - ASSESSMENT
86yo female, with PMH afib (on Coumadin), CVA and h/o aneurysm repair, infrarenal AAA, HTN, HLD, mitral valve prolapse, PVD of RLE s/p stents x3, rheumatoid arthritis, s/p hysterectomy, and L cataract, BIBEMS from [facility] for hypoxia, fever and lethargy. Pt was found to be satting 87% on RA and temperature of 101 F as per facility documentation. Pt tested positive for COVID on 1/12/21. The pt is s/p recent admission to the [facility] 1/5-1/8, was treated for PNA - received Zithromax x 3 days, completed a course of IV Zosyn, discharged home on Augmentin x 7 days. The had a low grade fever of 100.4 yesterday night, on supplemental O2 via NC, lethargic, WBC 11.27 on admission, decreased slightly now, + BRIDGET, with normal lactate, + elevated D-dimer 777, with normal liver enzymes, COVID 19 test is positive. CT head - no change from the study on the previous recent admission.   On admission the pt complained of diffuse abdominal pain, CT chest/abd/pelvis without contrast was performed - marked improvement in multifocal pneumonia, + emphysema, + colititis of sigmoid colon and rectum of inflammatory or infectious etiology, + gallstones that were seen on previous admission, HIDA scan was done then and negative. As per RN, the pt did not have any bowel movements overnight or in the morning.  On exam, the pt is lethargic, on supplemental O2 via NC, able to answer some questions appropriately but drifts into sleep in the middle of the sentence, pt's abdomen is diffusely tender upon palpation especially in the left lower quadrant. The pt was started on antimicrobial therapy on admission with Zosyn IV, remdesivir held secondary to BRIDGET, pt is on Decadron. +    The pt is admitted with COVID 19 and abdominal pain, colitis, BRIDGET.     Attending Addendum--  Case reviewed with NAJMA Brush. Her note reviewed and modified as appropriate.   Patient personally assessed and examined.  Seen this pm.   Patient complains of heel pain, exam without local inflammatory signs, warm/well perfused appearing. Postop changes. Does not appear to have limb ischemia. Chest exam with diminished breath sounds, SaO2 NC 93%. . Abdominal exam with significant tenderness LLQ > suprapubic area >> elsewhere. No rebound. + guarding. Deforming arthritis c/w known RA.  Patient denies diarrhea prior to admission or here in the hospital, states passed flatus. At risk for resistant daniele given recent zosyn, antibiotics altered. Need to remain vigilant for C. difficile. Patient w RA, no DMARDS/biologics known to place patient at increased risk for opportunistic pathogens. Patient with prior hx of mesenteric ischemia but pain not out of proportion to physical findings, rectum unsual location to have ischemia given duplicate blood supply. COVID certainly potentially associated with hyperocoagulable states. Merits decadron but will suppress abdominal exam. Remdesivir not shown to impact mortality, in setting of BRIDGET potentially harmful, would avoid.     1/17: no fevers, WBC increased - pt is on steroids, Cr increased - off remdesivir, Decadron continued, pt did not have a bowel movement as per RN and nursing assistant, remain vigilant for C. Diff if the pt will start having bowel movements.       1. Sigmoid colitis - no bowel movements   2. COVID 19   3. Fever  4. Leukocytosis  5. BRIDGET      - continue cefepime - renally dosed  - continue Flagyl 500 mg IV q 8 horus  - if pt will start having diarrhea, sent stool sample for C. Diff and start on oral Vancomycin  - agree with keeping the pt off remdesivir - renal function is worsening  - continue Decadron x 10 days   - follow blood cultures NGTD  - monitor pt's temperatures  - monitor WBC  - monitor pt's renal function  - supplemental O2 for O2 SAT >92%  - precautions as per protocol  - anticoagulation as per protocol  - trend inflammatory markers   - pain control   - palliative care consult

## 2021-01-17 NOTE — PROGRESS NOTE ADULT - SUBJECTIVE AND OBJECTIVE BOX
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    Osteoarthritis    Afib    PVD (peripheral vascular disease)    Neuropathy  (Right lower leg)    Gout    H/O cerebral aneurysm repair  brain clips    CVA (cerebral vascular accident)  (&quot;Mini-stroke&quot;,1990&#x27;s)    Rheumatoid arthritis    Asthma    Hyperlipidemia    Hypertension    S/P cataract surgery  (Left eye)    Elective surgery  (&quot;Twisted bowel&quot;, 2014)    Elective surgery  (Exision of cyst on liver, 1985)    S/P ORIF (open reduction internal fixation) fracture  Left hip fx (2012) &amp; R hip fx (2013)    H/O cerebral aneurysm repair  Brain clips (1978(    Renal stone  Cysto stent placement 10/1/2014    PVD (peripheral vascular disease)  s/p RLE bypass x 3, most recent 3/2012 Right external iliac to PT bypass w/ PTFE (2012)    S/P FRED (total abdominal hysterectomy)  (1987, Hx of &quot;ovarian cancer?&quot;)          Medication list         MEDICATIONS  (STANDING):  ascorbic acid 500 milliGRAM(s) Oral daily  aspirin enteric coated 81 milliGRAM(s) Oral daily  atorvastatin 40 milliGRAM(s) Oral at bedtime  budesonide  80 MICROgram(s)/formoterol 4.5 MICROgram(s) Inhaler 2 Puff(s) Inhalation two times a day  cefepime   IVPB      cefepime   IVPB 1000 milliGRAM(s) IV Intermittent every 24 hours  dexAMETHasone  Injectable 6 milliGRAM(s) IV Push daily  diltiazem    milliGRAM(s) Oral daily  gabapentin 100 milliGRAM(s) Oral three times a day  lactobacillus acidophilus 1 Tablet(s) Oral three times a day  metoprolol succinate  milliGRAM(s) Oral daily  metroNIDAZOLE  IVPB      metroNIDAZOLE  IVPB 500 milliGRAM(s) IV Intermittent every 8 hours  multivitamin 1 Tablet(s) Oral daily  pantoprazole    Tablet 40 milliGRAM(s) Oral every 12 hours  sodium chloride 0.9%. 1000 milliLiter(s) (60 mL/Hr) IV Continuous <Continuous>  tiotropium 18 MICROgram(s) Capsule 1 Capsule(s) Inhalation daily    MEDICATIONS  (PRN):  acetaminophen   Tablet .. 650 milliGRAM(s) Oral every 6 hours PRN Temp greater or equal to 38C (100.4F), Mild Pain (1 - 3)  ALBUTerol    90 MICROgram(s) HFA Inhaler 2 Puff(s) Inhalation every 3 hours PRN Shortness of Breath and/or Wheezing  guaiFENesin   Syrup  (Sugar-Free) 200 milliGRAM(s) Oral every 6 hours PRN Cough         Vitals log        ICU Vital Signs Last 24 Hrs  T(C): 36.4 (17 Jan 2021 04:48), Max: 36.8 (16 Jan 2021 06:48)  T(F): 97.5 (17 Jan 2021 04:48), Max: 98.2 (16 Jan 2021 06:48)  HR: 93 (17 Jan 2021 04:48) (68 - 99)  BP: 88/60 (17 Jan 2021 04:48) (88/60 - 106/71)  BP(mean): --  ABP: --  ABP(mean): --  RR: 18 (17 Jan 2021 04:48) (17 - 18)  SpO2: 96% (17 Jan 2021 04:48) (96% - 98%)           Input and Output:  I&O's Detail    16 Jan 2021 07:01  -  17 Jan 2021 06:46  --------------------------------------------------------  IN:    IV PiggyBack: 150 mL    sodium chloride 0.9%: 1000 mL  Total IN: 1150 mL    OUT:    Voided (mL): 250 mL  Total OUT: 250 mL    Total NET: 900 mL          Lab Data                        10.7   18.13 )-----------( 225      ( 17 Jan 2021 05:43 )             34.5     01-17    142  |  112<H>  |  60<H>  ----------------------------<  119<H>  4.1   |  19<L>  |  2.50<H>    Ca    7.5<L>      17 Jan 2021 05:43    TPro  5.9<L>  /  Alb  2.1<L>  /  TBili  0.3  /  DBili  x   /  AST  37  /  ALT  15  /  AlkPhos  81  01-17            Review of Systems	      Objective     Physical Examination    heart s1s2  lung dec BS  abd soft      Pertinent Lab findings & Imaging      Fransico:  NO   Adequate UO     I&O's Detail    16 Jan 2021 07:01  -  17 Jan 2021 06:46  --------------------------------------------------------  IN:    IV PiggyBack: 150 mL    sodium chloride 0.9%: 1000 mL  Total IN: 1150 mL    OUT:    Voided (mL): 250 mL  Total OUT: 250 mL    Total NET: 900 mL               Discussed with:     Cultures:	        Radiology

## 2021-01-18 NOTE — PROGRESS NOTE ADULT - ASSESSMENT
BRIDGET: Prerenal azotemia, ATN  COVID +, Pneumonia  Colitis  Hypertension now with hypotension    Continue gentle IV hydration. Strict i & O. Rx for COVID.   Avoid nephrotoxic meds as possible. Monitor BP trend. Will follow electrolytes and renal function trend.

## 2021-01-18 NOTE — PROGRESS NOTE ADULT - SUBJECTIVE AND OBJECTIVE BOX
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    Osteoarthritis    Afib    PVD (peripheral vascular disease)    Neuropathy  (Right lower leg)    Gout    H/O cerebral aneurysm repair  brain clips    CVA (cerebral vascular accident)  (&quot;Mini-stroke&quot;,1990&#x27;s)    Rheumatoid arthritis    Asthma    Hyperlipidemia    Hypertension    S/P cataract surgery  (Left eye)    Elective surgery  (&quot;Twisted bowel&quot;, 2014)    Elective surgery  (Exision of cyst on liver, 1985)    S/P ORIF (open reduction internal fixation) fracture  Left hip fx (2012) &amp; R hip fx (2013)    H/O cerebral aneurysm repair  Brain clips (1978(    Renal stone  Cysto stent placement 10/1/2014    PVD (peripheral vascular disease)  s/p RLE bypass x 3, most recent 3/2012 Right external iliac to PT bypass w/ PTFE (2012)    S/P FRED (total abdominal hysterectomy)  (1987, Hx of &quot;ovarian cancer?&quot;)          Medication list         MEDICATIONS  (STANDING):  ascorbic acid 500 milliGRAM(s) Oral daily  aspirin enteric coated 81 milliGRAM(s) Oral daily  atorvastatin 40 milliGRAM(s) Oral at bedtime  budesonide  80 MICROgram(s)/formoterol 4.5 MICROgram(s) Inhaler 2 Puff(s) Inhalation two times a day  cefepime   IVPB      cefepime   IVPB 1000 milliGRAM(s) IV Intermittent every 24 hours  dexAMETHasone  Injectable 6 milliGRAM(s) IV Push daily  diltiazem    milliGRAM(s) Oral daily  gabapentin 100 milliGRAM(s) Oral three times a day  lactobacillus acidophilus 1 Tablet(s) Oral three times a day  metoprolol tartrate 12.5 milliGRAM(s) Oral every 6 hours  metroNIDAZOLE  IVPB      metroNIDAZOLE  IVPB 500 milliGRAM(s) IV Intermittent every 8 hours  multivitamin 1 Tablet(s) Oral daily  pantoprazole    Tablet 40 milliGRAM(s) Oral every 12 hours  sodium chloride 0.9%. 1000 milliLiter(s) (60 mL/Hr) IV Continuous <Continuous>  sodium chloride 0.9%. 1000 milliLiter(s) (60 mL/Hr) IV Continuous <Continuous>  tiotropium 18 MICROgram(s) Capsule 1 Capsule(s) Inhalation daily    MEDICATIONS  (PRN):  acetaminophen   Tablet .. 650 milliGRAM(s) Oral every 6 hours PRN Temp greater or equal to 38C (100.4F), Mild Pain (1 - 3)  ALBUTerol    90 MICROgram(s) HFA Inhaler 2 Puff(s) Inhalation every 3 hours PRN Shortness of Breath and/or Wheezing  guaiFENesin   Syrup  (Sugar-Free) 200 milliGRAM(s) Oral every 6 hours PRN Cough         Vitals log        ICU Vital Signs Last 24 Hrs  T(C): 36.5 (18 Jan 2021 04:40), Max: 36.6 (18 Jan 2021 01:26)  T(F): 97.7 (18 Jan 2021 04:40), Max: 97.9 (18 Jan 2021 01:26)  HR: 95 (18 Jan 2021 04:40) (87 - 111)  BP: 136/89 (18 Jan 2021 04:40) (103/69 - 136/89)  BP(mean): --  ABP: --  ABP(mean): --  RR: 17 (18 Jan 2021 04:40) (16 - 17)  SpO2: 95% (18 Jan 2021 04:40) (94% - 98%)           Input and Output:  I&O's Detail    17 Jan 2021 07:01  -  18 Jan 2021 07:00  --------------------------------------------------------  IN:    IV PiggyBack: 100 mL    Oral Fluid: 370 mL  Total IN: 470 mL    OUT:    Indwelling Catheter - Urethral (mL): 90 mL    Voided (mL): 100 mL  Total OUT: 190 mL    Total NET: 280 mL          Lab Data                        10.0   23.13 )-----------( 230      ( 18 Jan 2021 06:24 )             31.7     01-18    144  |  115<H>  |  72<H>  ----------------------------<  119<H>  3.9   |  19<L>  |  2.40<H>    Ca    7.5<L>      18 Jan 2021 06:24    TPro  6.0  /  Alb  2.2<L>  /  TBili  0.3  /  DBili  x   /  AST  36  /  ALT  17  /  AlkPhos  78  01-18            Review of Systems	      Objective     Physical Examination  heart s1s2  lung dec BS  abd soft        Pertinent Lab findings & Imaging      Fransico:  NO   Adequate UO     I&O's Detail    17 Jan 2021 07:01  -  18 Jan 2021 07:00  --------------------------------------------------------  IN:    IV PiggyBack: 100 mL    Oral Fluid: 370 mL  Total IN: 470 mL    OUT:    Indwelling Catheter - Urethral (mL): 90 mL    Voided (mL): 100 mL  Total OUT: 190 mL    Total NET: 280 mL               Discussed with:     Cultures:	        Radiology

## 2021-01-18 NOTE — CONSULT NOTE ADULT - ASSESSMENT
86 yo woman on Coumadin for A-fib, recent admission for pneumonia, now with COVID19 and noted now with sigmoid colitis; noted to have supratherpeutic INR    - suspect elevated INR due to antibiotic induced vitamin K deficiency along with poor diet  - as long as INR is <10 and no sign of bleeding, can montiro  - if any sign/symptom of bleeding or if INR >10, can give vitamin K 2.5-5mg po to counterbalance; if active bleeding would also give plasma infusion to reverse coumadin  - continue supportive measures  - would resume coumadin when INR <2  - no additional acute heme interventions indicated at this time  - case discussed with Dr. Gutierrez (hospitalist)

## 2021-01-18 NOTE — PROGRESS NOTE ADULT - PROBLEM SELECTOR PLAN 7
Chronic . Hypotensive at present .   - Takes diltiazem 240mg qD, and metoprolol succinate 100mg qD at home,   - Monitor routine hemodynamics
Chronic . Hypotensive at present .   - Takes diltiazem 240mg qD, and metoprolol succinate 100mg qD at home,   - Monitor routine hemodynamics

## 2021-01-18 NOTE — PROGRESS NOTE ADULT - SUBJECTIVE AND OBJECTIVE BOX
[facility]  INFECTIOUS DISEASES   [address] [place] [address]  Tel: [phone]     Fax: [phone]  =======================================================    CARITO CONSTANTINO [id number]    Follow up: COVID and colitis     On NC o2, seems comfortable, no complaint, no abdominal pain. No diarrhea.   No fever. Cultures negative.     Allergies:  No Known Allergies    Antibiotics:    cefepime   IVPB 1000 milliGRAM(s) IV Intermittent every 24 hours    metroNIDAZOLE  IVPB 500 milliGRAM(s) IV Intermittent every 8 hours    REVIEW OF SYSTEMS:  CONSTITUTIONAL:  No Fever or chills  HEENT:   No diplopia or blurred vision.  No earache, sore throat or runny nose.  CARDIOVASCULAR:  No chest pain or SOB  RESPIRATORY:  No cough, shortness of breath, PND or orthopnea.  GASTROINTESTINAL:  No nausea, vomiting or diarrhea.  GENITOURINARY:  No dysuria, frequency or urgency. No Blood in urine  MUSCULOSKELETAL:  no joint aches, no muscle pain  SKIN:  No change in skin, hair or nails.  NEUROLOGIC:  No paresthesias or weakness.  PSYCHIATRIC:  No disorder of thought or mood.  ENDOCRINE:  No heat or cold intolerance, polyuria or polydipsia.  HEMATOLOGICAL:  No easy bruising or bleeding.      Physical Exam:  ICU Vital Signs Last 24 Hrs  T(C): 36.4 (18 Jan 2021 13:26), Max: 36.6 (18 Jan 2021 01:26)  T(F): 97.5 (18 Jan 2021 13:26), Max: 97.9 (18 Jan 2021 01:26)  HR: 73 (18 Jan 2021 13:26) (73 - 111)  BP: 98/54 (18 Jan 2021 13:26) (98/54 - 136/89)  RR: 16 (18 Jan 2021 13:26) (16 - 17)  SpO2: 92% (18 Jan 2021 13:26) (92% - 98%)  GEN: NAD  HEENT: normocephalic and atraumatic. EOMI. GINI.    NECK: Supple.  No lymphadenopathy   LUNGS: Scattered crackles bilaterally   HEART: Regular rate and rhythm   ABDOMEN: Soft, nontender, and nondistended.  Positive bowel sounds.    : No CVA tenderness  EXTREMITIES: Without any cyanosis, clubbing, rash, lesions or edema.  MSK: no joint swelling  NEUROLOGIC: grossly intact.  PSYCHIATRIC: Appropriate affect .  SKIN: No ulceration or induration present.      Labs:  01-18    144  |  115<H>  |  72<H>  ----------------------------<  119<H>  3.9   |  19<L>  |  2.40<H>    Ca    7.5<L>      18 Jan 2021 06:24    TPro  6.0  /  Alb  2.2<L>  /  TBili  0.3  /  DBili  x   /  AST  36  /  ALT  17  /  AlkPhos  78  01-18                        10.0   23.13 )-----------( 230      ( 18 Jan 2021 06:24 )             31.7     PT/INR - ( 18 Jan 2021 06:24 )   PT: 66.4 sec;   INR: 6.19 ratio    PTT - ( 18 Jan 2021 06:24 )  PTT:42.3 sec    LIVER FUNCTIONS - ( 18 Jan 2021 06:24 )  Alb: 2.2 g/dL / Pro: 6.0 g/dL / ALK PHOS: 78 U/L / ALT: 17 U/L / AST: 36 U/L / GGT: x           RECENT CULTURES:  01-16 @ 05:01 .Blood Blood-Peripheral     No growth to date.    01-05 @ 18:51 .Blood Blood     No Growth Final    All imaging and data are reviewed.   < from: CT Abdomen and Pelvis No Cont (01.16.21 @ 03:03) >  IMPRESSION:  Marked interval improvement in multifocal pneumonia.  Emphysema.  Compared to the recent CT of January 5, 2021, interval development of colitis of the sigmoid colon and rectum, of infectious or inflammatory etiology.  Redemonstration of gallstones.      Assessment and Plan:   86yo female, with PMH afib (on Coumadin), CVA and h/o aneurysm repair, infrarenal AAA, HTN, HLD, mitral valve prolapse, PVD of RLE s/p stents x3, rheumatoid arthritis, s/p hysterectomy, and L cataract, BIBEMS from [facility] for hypoxia, fever and lethargy. Pt was found to be satting 87% on RA and temperature of 101 F as per facility documentation. Pt tested positive for COVID on 1/12/21. The pt is s/p recent admission to the [facility] 1/5-1/8, was treated for PNA - received Zithromax x 3 days, completed a course of IV Zosyn, discharged home on Augmentin x 7 days. The had a low grade fever of 100.4 yesterday night, on supplemental O2 via NC, lethargic, WBC 11.27 on admission, decreased slightly now, + BRIDGET, with normal lactate, + elevated D-dimer 777, with normal liver enzymes, COVID 19 test is positive. CT head - no change from the study on the previous recent admission.   On admission the pt complained of diffuse abdominal pain, CT chest/abd/pelvis without contrast was performed - marked improvement in multifocal pneumonia, + emphysema, + colititis of sigmoid colon and rectum of inflammatory or infectious etiology, + gallstones that were seen on previous admission, HIDA scan was done then and negative. As per RN, the pt did not have any bowel movements overnight or in the morning.  On exam, the pt is lethargic, on supplemental O2 via NC, able to answer some questions appropriately but drifts into sleep in the middle of the sentence, pt's abdomen is diffusely tender upon palpation especially in the left lower quadrant. The pt was started on antimicrobial therapy on admission with Zosyn IV, remdesivir held secondary to BRIDGET, pt is on Decadron. +    Patient complains of heel pain, exam without local inflammatory signs, warm/well perfused appearing. Postop changes. Does not appear to have limb ischemia. Chest exam with diminished breath sounds, SaO2 NC 93%. . Abdominal exam with significant tenderness LLQ > suprapubic area >> elsewhere. No rebound. + guarding. Deforming arthritis c/w known RA.  Patient denies diarrhea prior to admission or here in the hospital, states passed flatus. At risk for resistant daniele given recent zosyn, antibiotics altered. Need to remain vigilant for C. difficile. Patient w RA, no DMARDS/biologics known to place patient at increased risk for opportunistic pathogens. Patient with prior hx of mesenteric ischemia but pain not out of proportion to physical findings, rectum unsual location to have ischemia given duplicate blood supply. COVID certainly potentially associated with hyperocoagulable states. Merits decadron but will suppress abdominal exam. Remdesivir not shown to impact mortality, in setting of BRIDGET potentially harmful, would avoid.     1/17: no fevers, WBC increased - pt is on steroids, Cr increased - off remdesivir, Decadron continued, pt did not have a bowel movement as per RN and nursing assistant, remain vigilant for C. Diff if the pt will start having bowel movements.   1/18: No fever, leukocytosis worse but on steroid. Blood cultures are negative. Creat 2.4 stable since yesterday but baseline is lower.     1. Sigmoid colitis - no bowel movements   2. COVID 19   3. Fever  4. Leukocytosis  5. BRIDGET    - continue cefepime - renally dosed  - continue Flagyl 500 mg IV q 8 horus  - if pt will start having diarrhea, sent stool sample for C. Diff and start on oral Vancomycin  - agree with keeping the pt off remdesivir - renal function is worsening  - continue Decadron x 10 days   - follow blood cultures NGTD  - monitor pt's temperatures  - monitor WBC  - monitor pt's renal function  - supplemental O2 for O2 SAT >92%  - precautions as per protocol  - anticoagulation as per protocol  - trend inflammatory markers   - pain control   - palliative care consult    Will follow.    Sindi Anthony MD  Division of infectious Diseases  Cell [phone] between 8am and 6pm  After 6pm and over the weekends please call ID service line at [phone].

## 2021-01-18 NOTE — PROGRESS NOTE ADULT - SUBJECTIVE AND OBJECTIVE BOX
INTERVAL HPI/OVERNIGHT EVENTS:  chart reviewed, on nc  seen by icu  no c/o abd pain, no diarrhea or s/s gib overnight per nursing  had loose bm yesterday per notes, specimens not sent  bps better  labs noted    MEDICATIONS  (STANDING):  ascorbic acid 500 milliGRAM(s) Oral daily  aspirin enteric coated 81 milliGRAM(s) Oral daily  atorvastatin 40 milliGRAM(s) Oral at bedtime  budesonide  80 MICROgram(s)/formoterol 4.5 MICROgram(s) Inhaler 2 Puff(s) Inhalation two times a day  cefepime   IVPB      cefepime   IVPB 1000 milliGRAM(s) IV Intermittent every 24 hours  dexAMETHasone  Injectable 6 milliGRAM(s) IV Push daily  diltiazem    milliGRAM(s) Oral daily  gabapentin 100 milliGRAM(s) Oral three times a day  lactobacillus acidophilus 1 Tablet(s) Oral three times a day  metoprolol tartrate 12.5 milliGRAM(s) Oral every 6 hours  metroNIDAZOLE  IVPB      metroNIDAZOLE  IVPB 500 milliGRAM(s) IV Intermittent every 8 hours  multivitamin 1 Tablet(s) Oral daily  pantoprazole    Tablet 40 milliGRAM(s) Oral every 12 hours  sodium chloride 0.9%. 1000 milliLiter(s) (60 mL/Hr) IV Continuous <Continuous>  sodium chloride 0.9%. 1000 milliLiter(s) (60 mL/Hr) IV Continuous <Continuous>  tiotropium 18 MICROgram(s) Capsule 1 Capsule(s) Inhalation daily    MEDICATIONS  (PRN):  acetaminophen   Tablet .. 650 milliGRAM(s) Oral every 6 hours PRN Temp greater or equal to 38C (100.4F), Mild Pain (1 - 3)  ALBUTerol    90 MICROgram(s) HFA Inhaler 2 Puff(s) Inhalation every 3 hours PRN Shortness of Breath and/or Wheezing  guaiFENesin   Syrup  (Sugar-Free) 200 milliGRAM(s) Oral every 6 hours PRN Cough      Allergies    No Known Allergies    Intolerances        Review of Systems:  tele f/u      Vital Signs Last 24 Hrs  T(C): 36.5 (18 Jan 2021 04:40), Max: 36.6 (18 Jan 2021 01:26)  T(F): 97.7 (18 Jan 2021 04:40), Max: 97.9 (18 Jan 2021 01:26)  HR: 95 (18 Jan 2021 04:40) (87 - 111)  BP: 136/89 (18 Jan 2021 04:40) (114/76 - 136/89)  BP(mean): --  RR: 17 (18 Jan 2021 04:40) (16 - 17)  SpO2: 95% (18 Jan 2021 04:40) (94% - 98%)    PHYSICAL EXAM:    tele f/u    LABS:                        10.0   23.13 )-----------( 230      ( 18 Jan 2021 06:24 )             31.7     01-18    144  |  115<H>  |  72<H>  ----------------------------<  119<H>  3.9   |  19<L>  |  2.40<H>    Ca    7.5<L>      18 Jan 2021 06:24    TPro  6.0  /  Alb  2.2<L>  /  TBili  0.3  /  DBili  x   /  AST  36  /  ALT  17  /  AlkPhos  78  01-18    PT/INR - ( 18 Jan 2021 06:24 )   PT: 66.4 sec;   INR: 6.19 ratio         PTT - ( 18 Jan 2021 06:24 )  PTT:42.3 sec      RADIOLOGY & ADDITIONAL TESTS:

## 2021-01-18 NOTE — PROGRESS NOTE ADULT - SUBJECTIVE AND OBJECTIVE BOX
Patient is a 85y old  Female who presents with a chief complaint of AMS, lethargy (18 Jan 2021 07:08)      INTERVAL HPI/OVERNIGHT EVENTS:  seen at bedside.  No issues overnight.  INR at 6.  WBC at 23.   Worsening labs.  Pt says she's terrible.  No SOB    MEDICATIONS  (STANDING):  ascorbic acid 500 milliGRAM(s) Oral daily  aspirin enteric coated 81 milliGRAM(s) Oral daily  atorvastatin 40 milliGRAM(s) Oral at bedtime  budesonide  80 MICROgram(s)/formoterol 4.5 MICROgram(s) Inhaler 2 Puff(s) Inhalation two times a day  cefepime   IVPB      cefepime   IVPB 1000 milliGRAM(s) IV Intermittent every 24 hours  dexAMETHasone  Injectable 6 milliGRAM(s) IV Push daily  diltiazem    milliGRAM(s) Oral daily  gabapentin 100 milliGRAM(s) Oral three times a day  lactobacillus acidophilus 1 Tablet(s) Oral three times a day  metoprolol tartrate 12.5 milliGRAM(s) Oral every 6 hours  metroNIDAZOLE  IVPB      metroNIDAZOLE  IVPB 500 milliGRAM(s) IV Intermittent every 8 hours  multivitamin 1 Tablet(s) Oral daily  pantoprazole    Tablet 40 milliGRAM(s) Oral every 12 hours  sodium chloride 0.9%. 1000 milliLiter(s) (60 mL/Hr) IV Continuous <Continuous>  sodium chloride 0.9%. 1000 milliLiter(s) (60 mL/Hr) IV Continuous <Continuous>  tiotropium 18 MICROgram(s) Capsule 1 Capsule(s) Inhalation daily    MEDICATIONS  (PRN):  acetaminophen   Tablet .. 650 milliGRAM(s) Oral every 6 hours PRN Temp greater or equal to 38C (100.4F), Mild Pain (1 - 3)  ALBUTerol    90 MICROgram(s) HFA Inhaler 2 Puff(s) Inhalation every 3 hours PRN Shortness of Breath and/or Wheezing  guaiFENesin   Syrup  (Sugar-Free) 200 milliGRAM(s) Oral every 6 hours PRN Cough      Allergies    No Known Allergies    Intolerances        REVIEW OF SYSTEMS:  as per HPI.  Rest of ROS negative    Vital Signs Last 24 Hrs  T(C): 36.5 (18 Jan 2021 04:40), Max: 36.6 (18 Jan 2021 01:26)  T(F): 97.7 (18 Jan 2021 04:40), Max: 97.9 (18 Jan 2021 01:26)  HR: 95 (18 Jan 2021 04:40) (87 - 111)  BP: 136/89 (18 Jan 2021 04:40) (114/76 - 136/89)  BP(mean): --  RR: 17 (18 Jan 2021 04:40) (16 - 17)  SpO2: 95% (18 Jan 2021 04:40) (94% - 98%)    PHYSICAL EXAM:  GENERAL: NAD, lethargic  HEAD:  Atraumatic, Normocephalic  EYES: EOMI, PERRLA, conjunctiva and sclera clear  ENMT: NC in place  NECK: Supple, No JVD, Normal thyroid  NERVOUS SYSTEM:  Alert & Awake, No focal motor/sensory deficits   CHEST/LUNG: Decreased to auscultation bilaterally; No rales, rhonchi, wheezing  HEART: S1S2+,  Regular rate and rhythm  ABDOMEN: Soft,  Nondistended; Bowel sounds present  EXTREMITIES:  2+ Peripheral Pulses, No clubbing, cyanosis  LYMPH: No lymphadenopathy noted  SKIN: No rashes, warm, dry     LABS:                        10.0   23.13 )-----------( 230      ( 18 Jan 2021 06:24 )             31.7     18 Jan 2021 06:24    144    |  115    |  72     ----------------------------<  119    3.9     |  19     |  2.40     Ca    7.5        18 Jan 2021 06:24    TPro  6.0    /  Alb  2.2    /  TBili  0.3    /  DBili  x      /  AST  36     /  ALT  17     /  AlkPhos  78     18 Jan 2021 06:24    PT/INR - ( 18 Jan 2021 06:24 )   PT: 66.4 sec;   INR: 6.19 ratio         PTT - ( 18 Jan 2021 06:24 )  PTT:42.3 sec    CAPILLARY BLOOD GLUCOSE          RADIOLOGY & ADDITIONAL TESTS:    Imaging Personally Reviewed:  [ ] YES     Consultant(s) Notes Reviewed:      Care Discussed with Consultants/Other Providers:    Advanced Directives: [ ] DNR  [ ] No feeding tube  [ ] MOLST in chart  [ ] MOLST completed today  [ ] Unknown   Patient is a 85y old  Female who presents with a chief complaint of AMS, lethargy (18 Jan 2021 07:08)      INTERVAL HPI/OVERNIGHT EVENTS:  seen at bedside.  No issues overnight.  INR at 6.  WBC at 23.   Worsening labs.  Pt says she feels terrible.  No SOB    MEDICATIONS  (STANDING):  ascorbic acid 500 milliGRAM(s) Oral daily  aspirin enteric coated 81 milliGRAM(s) Oral daily  atorvastatin 40 milliGRAM(s) Oral at bedtime  budesonide  80 MICROgram(s)/formoterol 4.5 MICROgram(s) Inhaler 2 Puff(s) Inhalation two times a day  cefepime   IVPB      cefepime   IVPB 1000 milliGRAM(s) IV Intermittent every 24 hours  dexAMETHasone  Injectable 6 milliGRAM(s) IV Push daily  diltiazem    milliGRAM(s) Oral daily  gabapentin 100 milliGRAM(s) Oral three times a day  lactobacillus acidophilus 1 Tablet(s) Oral three times a day  metoprolol tartrate 12.5 milliGRAM(s) Oral every 6 hours  metroNIDAZOLE  IVPB      metroNIDAZOLE  IVPB 500 milliGRAM(s) IV Intermittent every 8 hours  multivitamin 1 Tablet(s) Oral daily  pantoprazole    Tablet 40 milliGRAM(s) Oral every 12 hours  sodium chloride 0.9%. 1000 milliLiter(s) (60 mL/Hr) IV Continuous <Continuous>  sodium chloride 0.9%. 1000 milliLiter(s) (60 mL/Hr) IV Continuous <Continuous>  tiotropium 18 MICROgram(s) Capsule 1 Capsule(s) Inhalation daily    MEDICATIONS  (PRN):  acetaminophen   Tablet .. 650 milliGRAM(s) Oral every 6 hours PRN Temp greater or equal to 38C (100.4F), Mild Pain (1 - 3)  ALBUTerol    90 MICROgram(s) HFA Inhaler 2 Puff(s) Inhalation every 3 hours PRN Shortness of Breath and/or Wheezing  guaiFENesin   Syrup  (Sugar-Free) 200 milliGRAM(s) Oral every 6 hours PRN Cough      Allergies    No Known Allergies    Intolerances        REVIEW OF SYSTEMS:  as per HPI.  Rest of ROS negative    Vital Signs Last 24 Hrs  T(C): 36.5 (18 Jan 2021 04:40), Max: 36.6 (18 Jan 2021 01:26)  T(F): 97.7 (18 Jan 2021 04:40), Max: 97.9 (18 Jan 2021 01:26)  HR: 95 (18 Jan 2021 04:40) (87 - 111)  BP: 136/89 (18 Jan 2021 04:40) (114/76 - 136/89)  BP(mean): --  RR: 17 (18 Jan 2021 04:40) (16 - 17)  SpO2: 95% (18 Jan 2021 04:40) (94% - 98%)    PHYSICAL EXAM:  GENERAL: NAD, lethargic  HEAD:  Atraumatic, Normocephalic  EYES: EOMI, PERRLA, conjunctiva and sclera clear  ENMT: NC in place  NECK: Supple, No JVD, Normal thyroid  NERVOUS SYSTEM:  Alert & Awake, No focal motor/sensory deficits   CHEST/LUNG: Decreased to auscultation bilaterally; No rales, rhonchi, wheezing  HEART: S1S2+,  Regular rate and rhythm  ABDOMEN: Soft,  Nondistended; Bowel sounds present  EXTREMITIES:  2+ Peripheral Pulses, No clubbing, cyanosis  LYMPH: No lymphadenopathy noted  SKIN: No rashes, warm, dry     LABS:                        10.0   23.13 )-----------( 230      ( 18 Jan 2021 06:24 )             31.7     18 Jan 2021 06:24    144    |  115    |  72     ----------------------------<  119    3.9     |  19     |  2.40     Ca    7.5        18 Jan 2021 06:24    TPro  6.0    /  Alb  2.2    /  TBili  0.3    /  DBili  x      /  AST  36     /  ALT  17     /  AlkPhos  78     18 Jan 2021 06:24    PT/INR - ( 18 Jan 2021 06:24 )   PT: 66.4 sec;   INR: 6.19 ratio         PTT - ( 18 Jan 2021 06:24 )  PTT:42.3 sec    CAPILLARY BLOOD GLUCOSE          RADIOLOGY & ADDITIONAL TESTS:    Imaging Personally Reviewed:  [ ] YES     Consultant(s) Notes Reviewed:      Care Discussed with Consultants/Other Providers:    Advanced Directives: [ ] DNR  [ ] No feeding tube  [ ] MOLST in chart  [ ] MOLST completed today  [ ] Unknown

## 2021-01-18 NOTE — PROGRESS NOTE ADULT - SUBJECTIVE AND OBJECTIVE BOX
Patient is a 85y old  Female who presents with a chief complaint of AMS, lethargy (17 Jan 2021 09:12)  Patient seen in follow up for BRIDGET.     On IVF.        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    Osteoarthritis    Afib    PVD (peripheral vascular disease)    Neuropathy    Gout    H/O cerebral aneurysm repair    CVA (cerebral vascular accident)    Rheumatoid arthritis    Asthma    Hyperlipidemia    Hypertension      MEDICATIONS  (STANDING):  ascorbic acid 500 milliGRAM(s) Oral daily  aspirin enteric coated 81 milliGRAM(s) Oral daily  atorvastatin 40 milliGRAM(s) Oral at bedtime  budesonide  80 MICROgram(s)/formoterol 4.5 MICROgram(s) Inhaler 2 Puff(s) Inhalation two times a day  cefepime   IVPB      cefepime   IVPB 1000 milliGRAM(s) IV Intermittent every 24 hours  dexAMETHasone  Injectable 6 milliGRAM(s) IV Push daily  diltiazem    milliGRAM(s) Oral daily  gabapentin 100 milliGRAM(s) Oral three times a day  lactobacillus acidophilus 1 Tablet(s) Oral three times a day  metoprolol tartrate 12.5 milliGRAM(s) Oral every 6 hours  metroNIDAZOLE  IVPB 500 milliGRAM(s) IV Intermittent every 8 hours  metroNIDAZOLE  IVPB      multivitamin 1 Tablet(s) Oral daily  pantoprazole    Tablet 40 milliGRAM(s) Oral every 12 hours  sodium chloride 0.9%. 1000 milliLiter(s) (60 mL/Hr) IV Continuous <Continuous>  tiotropium 18 MICROgram(s) Capsule 1 Capsule(s) Inhalation daily    MEDICATIONS  (PRN):  acetaminophen   Tablet .. 650 milliGRAM(s) Oral every 6 hours PRN Temp greater or equal to 38C (100.4F), Mild Pain (1 - 3)  ALBUTerol    90 MICROgram(s) HFA Inhaler 2 Puff(s) Inhalation every 3 hours PRN Shortness of Breath and/or Wheezing  guaiFENesin   Syrup  (Sugar-Free) 200 milliGRAM(s) Oral every 6 hours PRN Cough    T(C): 36.4 (01-18-21 @ 13:26), Max: 36.6 (01-18-21 @ 01:26)  HR: 73 (01-18-21 @ 13:26) (68 - 111)  BP: 98/54 (01-18-21 @ 13:26) (88/60 - 136/89)  RR: 16 (01-18-21 @ 13:26)  SpO2: 92% (01-18-21 @ 13:26)  Wt(kg): --  I&O's Detail    17 Jan 2021 07:01  -  18 Jan 2021 07:00  --------------------------------------------------------  IN:    IV PiggyBack: 100 mL    Oral Fluid: 370 mL  Total IN: 470 mL    OUT:    Indwelling Catheter - Urethral (mL): 90 mL    Voided (mL): 100 mL  Total OUT: 190 mL    Total NET: 280 mL        PHYSICAL EXAM:  General: No distress  Respiratory: b/l air entry  Cardiovascular: S1 S2  Gastrointestinal: soft, + tenderness  Extremities:  no edema                              LABORATORY:                        10.0   23.13 )-----------( 230      ( 18 Jan 2021 06:24 )             31.7     01-18    144  |  115<H>  |  72<H>  ----------------------------<  119<H>  3.9   |  19<L>  |  2.40<H>    Ca    7.5<L>      18 Jan 2021 06:24    TPro  6.0  /  Alb  2.2<L>  /  TBili  0.3  /  DBili  x   /  AST  36  /  ALT  17  /  AlkPhos  78  01-18    Sodium, Serum: 144 mmol/L (01-18 @ 06:24)  Sodium, Serum: 142 mmol/L (01-17 @ 05:43)    Potassium, Serum: 3.9 mmol/L (01-18 @ 06:24)  Potassium, Serum: 4.1 mmol/L (01-17 @ 05:43)    Hemoglobin: 10.0 g/dL (01-18 @ 06:24)  Hemoglobin: 10.7 g/dL (01-17 @ 05:43)  Hemoglobin: 10.1 g/dL (01-16 @ 09:34)  Hemoglobin: 11.2 g/dL (01-16 @ 00:03)    Creatinine, Serum 2.40 (01-18 @ 06:24)  Creatinine, Serum 2.50 (01-17 @ 05:43)  Creatinine, Serum 1.90 (01-16 @ 09:34)  Creatinine, Serum 1.70 (01-16 @ 00:03)        LIVER FUNCTIONS - ( 18 Jan 2021 06:24 )  Alb: 2.2 g/dL / Pro: 6.0 g/dL / ALK PHOS: 78 U/L / ALT: 17 U/L / AST: 36 U/L / GGT: x

## 2021-01-18 NOTE — PROGRESS NOTE ADULT - PROBLEM SELECTOR PLAN 6
Chronic    - Known h/o afib, On Coumadin   - Takes diltiazem 240mg qD and metoprolol succinate 100mg qD at home, BP on low side so not getting it   - EKG: afib, HR: 105, no ST elevation or depression   - Most recent TTE on last admission-Low-normal left ventricular systolic function, estimated LVEF of 50%. Mild AI. Moderate MR. Mild TR.    - Monitor daily INR and dose Coumadin accordingly
Chronic    - Known h/o afib, On Coumadin   - Takes diltiazem 240mg qD and metoprolol succinate 100mg qD at home, BP on low side so not getting it   - EKG: afib, HR: 105, no ST elevation or depression   - Most recent TTE on last admission-Low-normal left ventricular systolic function, estimated LVEF of 50%. Mild AI. Moderate MR. Mild TR.    - Monitor daily INR and dose Coumadin accordingly

## 2021-01-18 NOTE — PROGRESS NOTE ADULT - ATTENDING COMMENTS
11. Supratherapeutic INR- 6.  Hematology consulted, continue to hold coumadin.  no signs of bleeding.  If worsening, will give one dose of 2.5mg Vit K. 11. Supratherapeutic INR- 6.  Hematology consulted, continue to hold coumadin.  no signs of bleeding.  If worsening, will give one dose of 2.5mg Vit K.    Spoke with Dixie.  LI discussed.  Wants Full code for now.

## 2021-01-18 NOTE — PROGRESS NOTE ADULT - ASSESSMENT
86yo F, with PMH afib (on Coumadin), CVA and h/o aneurysm repair, infrarenal AAA, HTN, HLD, mitral valve prolapse, PVD of RLE s/p stents x3, rheumatoid arthritis, s/p hysterectomy, and L cataract, BIBEMS from [facility] for hypoxia, fever and lethargy. Admitted for acute hypoxic respiratory failure 2/2 confirmed COVID-19 infection and colitis.

## 2021-01-18 NOTE — CONSULT NOTE ADULT - SUBJECTIVE AND OBJECTIVE BOX
Patient is a 85y old  Female who presents with a chief complaint of AMS, lethargy (18 Jan 2021 14:05)      HPI:  84yo F, with PMH afib (on Coumadin as outpatient ), CVA and h/o aneurysm repair, infrarenal AAA, HTN, HLD, mitral valve prolapse, PVD of RLE s/p stents x3, rheumatoid arthritis, s/p hysterectomy, and L cataract, BIBEMS from [facility] for hypoxia, fever and lethargy. She was previously admitted to [facility] with multifocal pneumonia 1/5/21-->1/8/21 and discharged on antibiotics ans supplemental O2. On 1/12/21 she was tested and found to be +COVID-19.   In ER on 1/16/21 she was found to have temp 101, O2 sat 87%.    CXR: left sided infiltrate improved when compared to CXR from 1/5 - pending official read   CT Head: No significant interval change compared to the previous CT of January 5, 2021.No intracranial hemorrhage, mass effect or large acute cortical infarct. Focal old left frontal and temporal encephalomalacia.  Old bilateral lacunar infarcts.  CT Chest: Marked interval improvement in multifocal pneumonia.  Emphysema. Compared to the recent CT of January 5, 2021.  CT Abdomen and Pelvis: Interval development of colitis of the sigmoid colon and rectum, of infectious or inflammatory etiology. Redemonstration of gallstones.    She was noted to have INR 1.66. She was given dose of coumadin on 1/16/21. She has been started on antimicrobials by ID and has not been able to eat much due to not having her dentures and generalized malaise; she is noted today to have INR>6.  She denies any bruising, nosebleeds, oral mucocutaneous bleeding, hematuria or blood in her stools.      Asked now for heme eval.         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  Osteoarthritis  Afib  PVD (peripheral vascular disease)  Neuropathy  (Right lower leg)  H/O cerebral aneurysm repair - brain clips  CVA (cerebral vascular accident)  Rheumatoid arthritis  Hyperlipidemia  Hypertension    S/P cataract surgery - (Left eye)  Elective surgery (Twisted bowel&quot;, 2014)  Elective surgery (Exision of cyst on liver, 1985)  S/P ORIF (open reduction internal fixation) fractureLeft hip fx (2012) &amp; R hip fx (2013)  H/O cerebral aneurysm repair - Brain clips (1978)  Renal stone - Cysto stent placement 10/1/2014  PVD (peripheral vascular disease) - s/p RLE bypass x 3, most recent 3/2012 Right external iliac to PT bypass w/ PTFE (2012)  S/P FRED (total abdominal hysterectomy) - (1987, Hx of &quot;ovarian cancer?&quot;)    SOCIAL HISTORY:  - lives at assisted living  - denies ETOH or tobacco use    FAMILY HISTORY:  No pertinent family history in first degree relatives        MEDICATIONS  (STANDING):  ascorbic acid 500 milliGRAM(s) Oral daily  aspirin enteric coated 81 milliGRAM(s) Oral daily  atorvastatin 40 milliGRAM(s) Oral at bedtime  budesonide  80 MICROgram(s)/formoterol 4.5 MICROgram(s) Inhaler 2 Puff(s) Inhalation two times a day  cefepime   IVPB      cefepime   IVPB 1000 milliGRAM(s) IV Intermittent every 24 hours  dexAMETHasone  Injectable 6 milliGRAM(s) IV Push daily  diltiazem    milliGRAM(s) Oral daily  gabapentin 100 milliGRAM(s) Oral three times a day  lactobacillus acidophilus 1 Tablet(s) Oral three times a day  metoprolol tartrate 12.5 milliGRAM(s) Oral every 6 hours  metroNIDAZOLE  IVPB 500 milliGRAM(s) IV Intermittent every 8 hours  metroNIDAZOLE  IVPB      multivitamin 1 Tablet(s) Oral daily  pantoprazole    Tablet 40 milliGRAM(s) Oral every 12 hours  sodium chloride 0.9%. 1000 milliLiter(s) (60 mL/Hr) IV Continuous <Continuous>  tiotropium 18 MICROgram(s) Capsule 1 Capsule(s) Inhalation daily    MEDICATIONS  (PRN):  acetaminophen   Tablet .. 650 milliGRAM(s) Oral every 6 hours PRN Temp greater or equal to 38C (100.4F), Mild Pain (1 - 3)  ALBUTerol    90 MICROgram(s) HFA Inhaler 2 Puff(s) Inhalation every 3 hours PRN Shortness of Breath and/or Wheezing  guaiFENesin   Syrup  (Sugar-Free) 200 milliGRAM(s) Oral every 6 hours PRN Cough      Allergies    No Known Allergies    Vital Signs Last 24 Hrs  T(C): 36.4 (18 Jan 2021 13:26), Max: 36.6 (18 Jan 2021 01:26)  T(F): 97.5 (18 Jan 2021 13:26), Max: 97.9 (18 Jan 2021 01:26)  HR: 73 (18 Jan 2021 13:26) (73 - 111)  BP: 98/54 (18 Jan 2021 13:26) (98/54 - 136/89)  BP(mean): --  RR: 16 (18 Jan 2021 13:26) (16 - 17)  SpO2: 92% (18 Jan 2021 13:26) (92% - 98%)    PHYSICAL EXAM  General: adult in NAD  HEENT: clear oropharynx, anicteric sclera, pink conjunctivae  Neck: supple  CV: normal S1S2 irregularly irregular; rate controlled  Lungs: course breath sounds  Abdomen: soft non-tender non-distended, no hepato/splenomegaly  Ext: no clubbing cyanosis or edema  Skin: no rashes and no petichiae; no evidence of bruising  Neuro: alert and oriented X3 no focal deficits      LABS:    CBC Full  -  ( 18 Jan 2021 06:24 )  WBC Count : 23.13 K/uL  RBC Count : 3.64 M/uL  Hemoglobin : 10.0 g/dL  Hematocrit : 31.7 %  Platelet Count - Automated : 230 K/uL  Mean Cell Volume : 87.1 fl  Mean Cell Hemoglobin : 27.5 pg  Mean Cell Hemoglobin Concentration : 31.5 gm/dL  Auto Neutrophil # : 22.20 K/uL  Auto Lymphocyte # : 0.23 K/uL  Auto Monocyte # : 0.46 K/uL  Auto Eosinophil # : 0.00 K/uL  Auto Basophil # : 0.00 K/uL  Auto Neutrophil % : 93.0 %  Auto Lymphocyte % : 1.0 %  Auto Monocyte % : 2.0 %  Auto Eosinophil % : 0.0 %  Auto Basophil % : 0.0 %    01-18    144  |  115<H>  |  72<H>  ----------------------------<  119<H>  3.9   |  19<L>  |  2.40<H>    Ca    7.5<L>      18 Jan 2021 06:24    TPro  6.0  /  Alb  2.2<L>  /  TBili  0.3  /  DBili  x   /  AST  36  /  ALT  17  /  AlkPhos  78  01-18    PTT - ( 18 Jan 2021 06:24 )  PTT:42.3 sec  PT/INR - ( 18 Jan 2021 06:24 )   PT: 66.4 sec;   INR: 6.19 ratio      INR: 6.19 ratio (01-18-21 @ 06:24)  INR: 3.05 ratio (01-17-21 @ 05:43)  INR: 1.85 ratio (01-16-21 @ 09:34)  INR: 1.66 ratio (01-16-21 @ 00:03)

## 2021-01-18 NOTE — PROGRESS NOTE ADULT - ASSESSMENT
85f well known to our office and sees Dr. Laughlin.  She has afib (on Coumadin), GIB, CVA and h/o intracranial aneurysm repair, infrarenal AAA, HTN, HLD, mitral valve prolapse, PVD of RLE with failed bypass, failed redo bypass and failed multiple stents, with persistent occlusion.  She has rheumatoid arthritis, s/p hysterectomy, and L cataract.  She is known to have a borderline lvef of 50 by echo done 1 week ago when she was hosp with pna and an acute abd process.  At that time she was more rapid af given that she was not taking her meds, and her meds were adjusted to include metop 12.5 q6 and dilt 240.  Metop was changed to xl 100 at [facility] from [facility] for hypoxia, fever and lethargy. She has been treated for COVID pna/resp failure and colitis. She is being treated with decadron for COVID, but her BRIDGET precludes her from receiving remdesivir.  GI/ID following for colitis and recently switched to cefepime and flagyl.  She has had worsened issues with hypoxia and hypotension, and both icu and cardiology were consulted this am.      She does not report any sig sxs now.  She does say her abdomen is sore.  She is aware that she is covid +    Afib with RVR  - No evidence of acute ischemia or volume overload  - Her HR was uncontrolled for the most part, though, improved towards the morning  - Known pad and presumably has cad, but not active at present  - Was initially hypotensive but now BP is significantly improved to systolic 110'130's  - Increase BB and continue Cardizem CD for now  - Will hold Coumadin in the setting of supratherapeutic INR (6.19).  - Monitor electrolytes, replete to keep K>4 and Mag>2  - TTE this month showed low-normal LVSF, EF 50%, normal RVF, with moderate MR.  No need to repeat unless change in cardiac status    Covid Pna  - Supplemental O2  - Supportive care  - Continue current management  - Initiate incentive spirometer  - Follow ID and Pulm recs    Supratherapeutic INR  - Was last dosed with Coumadin for Afib on 1/15  - Will continue to trend  - Patient with colitis.  Can hold ASA if needed    Hx of CVA  - Cont ASA for now  - Continue statin    BRIDGET  - Renal funsction worsening with creatinine of 2.4 today  - Continue to trend and avoid nephrotoxics  - Follow renal recs  - Okay to give hydration in the setting of poor PO intake    Further cardiac w/u pending clinical course  Will continue to follow      Isabel Quintero DNP, NP-C  Cardiology   Spectra #[phone]

## 2021-01-18 NOTE — PROGRESS NOTE ADULT - PROBLEM SELECTOR PLAN 8
Chronic-history of peripheral arterial disease s/p 2 stents placed in RLE  - CTA Abdomen/Pelvis on last admission with evidence of occluded R femoral bypass graft  - Daughter, Dixie, reports patient has known occlusion of femoral graft  - Outpatient follow up for an angiogram
Chronic-history of peripheral arterial disease s/p 2 stents placed in RLE  - CTA Abdomen/Pelvis on last admission with evidence of occluded R femoral bypass graft  - Daughter, Dixie, reports patient has known occlusion of femoral graft  - Outpatient follow up for an angiogram

## 2021-01-18 NOTE — PROGRESS NOTE ADULT - SUBJECTIVE AND OBJECTIVE BOX
[facility] -- Dahiana Bañuelos, Toney Horner, Truman Falcon [place], [place]  Office # [phone]    Follow Up:  Afib, SOB with hypoxia    Subjective/Observations: Awake, comfortable on NC.  c/o back and foot pain.  Denies any abdominal or chest discomfort.  Denies any form of bleeding.  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    Osteoarthritis    Afib    PVD (peripheral vascular disease)    Neuropathy  (Right lower leg)    H/O cerebral aneurysm repair  brain clips    CVA (cerebral vascular accident)  (&quot;Mini-stroke&quot;,1990&#x27;s)    Rheumatoid arthritis    Hyperlipidemia    Hypertension    S/P cataract surgery  (Left eye)    Elective surgery  (&quot;Twisted bowel&quot;, 2014)    Elective surgery  (Exision of cyst on liver, 1985)    S/P ORIF (open reduction internal fixation) fracture  Left hip fx (2012) &amp; R hip fx (2013)    H/O cerebral aneurysm repair  Brain clips (1978(    Renal stone  Cysto stent placement 10/1/2014    PVD (peripheral vascular disease)  s/p RLE bypass x 3, most recent 3/2012 Right external iliac to PT bypass w/ PTFE (2012)    S/P FRED (total abdominal hysterectomy)  (1987, Hx of &quot;ovarian cancer?&quot;)    MEDICATIONS  (STANDING):  ascorbic acid 500 milliGRAM(s) Oral daily  aspirin enteric coated 81 milliGRAM(s) Oral daily  atorvastatin 40 milliGRAM(s) Oral at bedtime  budesonide  80 MICROgram(s)/formoterol 4.5 MICROgram(s) Inhaler 2 Puff(s) Inhalation two times a day  cefepime   IVPB      cefepime   IVPB 1000 milliGRAM(s) IV Intermittent every 24 hours  dexAMETHasone  Injectable 6 milliGRAM(s) IV Push daily  diltiazem    milliGRAM(s) Oral daily  gabapentin 100 milliGRAM(s) Oral three times a day  lactobacillus acidophilus 1 Tablet(s) Oral three times a day  metoprolol tartrate 12.5 milliGRAM(s) Oral every 6 hours  metroNIDAZOLE  IVPB      metroNIDAZOLE  IVPB 500 milliGRAM(s) IV Intermittent every 8 hours  multivitamin 1 Tablet(s) Oral daily  pantoprazole    Tablet 40 milliGRAM(s) Oral every 12 hours  sodium chloride 0.9%. 1000 milliLiter(s) (60 mL/Hr) IV Continuous <Continuous>  sodium chloride 0.9%. 1000 milliLiter(s) (60 mL/Hr) IV Continuous <Continuous>  tiotropium 18 MICROgram(s) Capsule 1 Capsule(s) Inhalation daily    MEDICATIONS  (PRN):  acetaminophen   Tablet .. 650 milliGRAM(s) Oral every 6 hours PRN Temp greater or equal to 38C (100.4F), Mild Pain (1 - 3)  ALBUTerol    90 MICROgram(s) HFA Inhaler 2 Puff(s) Inhalation every 3 hours PRN Shortness of Breath and/or Wheezing  guaiFENesin   Syrup  (Sugar-Free) 200 milliGRAM(s) Oral every 6 hours PRN Cough    Allergies    No Known Allergies    Intolerances      Vital Signs Last 24 Hrs  T(C): 36.5 (18 Jan 2021 04:40), Max: 36.6 (18 Jan 2021 01:26)  T(F): 97.7 (18 Jan 2021 04:40), Max: 97.9 (18 Jan 2021 01:26)  HR: 95 (18 Jan 2021 04:40) (87 - 111)  BP: 136/89 (18 Jan 2021 04:40) (114/76 - 136/89)  BP(mean): --  RR: 17 (18 Jan 2021 04:40) (16 - 17)  SpO2: 95% (18 Jan 2021 04:40) (94% - 98%)  I&O's Summary    17 Jan 2021 07:01  -  18 Jan 2021 07:00  --------------------------------------------------------  IN: 470 mL / OUT: 190 mL / NET: 280 mL    PHYSICAL EXAM:  TELE: Not on tele  Constitutional: NAD, awake, well-developed  HEENT: Moist Mucous Membranes, Anicteric  Pulmonary: Non-labored, breath sounds are clear bilaterally, No wheezing, rales or rhonchi  Cardiovascular: IRRR, S1 and S2, + murmurs, no rubs, gallops or clicks  Gastrointestinal: Bowel Sounds present, soft, nontender.   Lymph: No peripheral edema. No lymphadenopathy.  Skin: No visible rashes or ulcers.  Pale in color  Psych:  Mood & affect: flat  LABS: All Labs Reviewed:                        10.0   23.13 )-----------( 230      ( 18 Jan 2021 06:24 )             31.7                         10.7   18.13 )-----------( 225      ( 17 Jan 2021 05:43 )             34.5                         10.1   10.78 )-----------( 213      ( 16 Jan 2021 09:34 )             32.1     18 Jan 2021 06:24    144    |  115    |  72     ----------------------------<  119    3.9     |  19     |  2.40   17 Jan 2021 05:43    142    |  112    |  60     ----------------------------<  119    4.1     |  19     |  2.50   16 Jan 2021 09:34    142    |  112    |  44     ----------------------------<  111    3.5     |  20     |  1.90     Ca    7.5        18 Jan 2021 06:24  Ca    7.5        17 Jan 2021 05:43  Ca    7.7        16 Jan 2021 09:34    TPro  6.0    /  Alb  2.2    /  TBili  0.3    /  DBili  x      /  AST  36     /  ALT  17     /  AlkPhos  78     18 Jan 2021 06:24  TPro  5.9    /  Alb  2.1    /  TBili  0.3    /  DBili  x      /  AST  37     /  ALT  15     /  AlkPhos  81     17 Jan 2021 05:43  TPro  6.2    /  Alb  2.3    /  TBili  0.5    /  DBili  x      /  AST  31     /  ALT  17     /  AlkPhos  103    16 Jan 2021 09:34    PT/INR - ( 18 Jan 2021 06:24 )   PT: 66.4 sec;   INR: 6.19 ratio      PTT - ( 18 Jan 2021 06:24 )  PTT:42.3 sec       EXAM:  ECHO TTE WO CON COMP W DOPP         PROCEDURE DATE:  01/06/2021        INTERPRETATION:  INDICATION: Palpitations  Sonographer TH    Blood Pressure 149/83    Height 154 cm     Weight 63 kg       BSA 1.6 sq m    Dimensions:  LA 4.4       Normal Values: 2.0 - 4.0 cm  Ao 2.6        Normal Values: 2.0 - 3.8 cm  SEPTUM 0.7       Normal Values: 0.6 - 1.2 cm  PWT 0.8       Normal Values: 0.6 - 1.1 cm  LVIDd 4.8         Normal Values: 3.0 - 5.6 cm  LVIDs 3.3         Normal Values: 1.8 - 4.0 cm      OBSERVATIONS:  Technically difficult study  Mitral Valve: Thickened leaflets, moderate MR.  Aortic Valve/Aorta: Sclerotic trileaflet aortic valve with normal opening. Mild AI  Tricuspid Valve: normal with trace TR.  Pulmonic Valve: Mild PI  Left Atrium: Enlarged  Right Atrium: Not well-visualized  Left Ventricle: Low-normal left ventricular systolic function, estimated LVEF of 50%.  Right Ventricle: Grossly normal size and systolic function.  Pericardium/Pleura: normal, no significant pericardial effusion.  Pulmonary/RV Pressure: estimated PA systolic pressure of 29 mmHg    Conclusion:  Technically difficult study  Low-normal left ventricular systolic function, estimated LVEF of 50%.  Grossly normal RV size and systolic function.  Left atrial enlargement  Sclerotic trileaflet aortic valve, mild AI.  Moderate MR  Mild TR.      JACQUELINE GOODEN MD; Attending Cardiologist  This document has been electronically signed. Jan 7 2021  5:51PM    EXAM:  CT ABDOMEN AND PELVIS                          EXAM:  CT CHEST                          PROCEDURE DATE:  01/16/2021      INTERPRETATION:  CT of the chest, abdomen and pelvis    Indication: [Hypoxia], unresponsive, fever    Technique: Axial images were obtained from the thoracic inlet through pubic symphysis without oral or IV contrast. Reformatted coronal and sagittal images were submitted.    Comparison: CT of 1/5/21    FINDINGS:    Evaluation of the solid abdominal organs is limited without contrast.    A 1.2 cm left thyroid hypodensity.  The visualized axilla and subcutaneous tissues are unremarkable.    The tracheobronchial tree is patent centrally.  There is no mediastinal hematoma.  The great vessels are not enlarged.  There are nonspecific mediastinal lymph nodes are unchanged, measuring 1.3 x 1.5 cm in the left paratracheal region and is stable and 1.1 x 0.8 cm in the right paratracheal region.  Atherosclerotic calcifications of the thoracic aorta and coronary arteries.    The heart is not enlarged.  There is no pericardial effusion.    Lungs: Mild to moderate centrilobular emphysema.  Previously visualized right middle and lower lobe and left upper lobe opacities are significantly decreased compared to the previous study of January 5, 2021. There are mild patchy opacities at the lung bases.    Pleura: Trace right pleural effusion.  There is no free air or focal collection.  No free fluid.    Liver: Normal.  Spleen: Normal.  Gallbladder: Moderately distendedwith gallstones.  Biliary tree: Unremarkable.  Adrenal glands: Normal.  Pancreas: Normal.  Kidneys: No hydronephrosis or obstructing stones. Nonobstructive right renal stones measure up to 7 mm.    Bowel:  There is no small bowel obstruction.  There is moderate thickening of the sigmoid colon and rectum compatible with colitis. There is surrounding inflammation without free fluid.    Bladder: Decompressed.  Pelvic organs: No pelvic masses.    There is no significant adenopathy.  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 Moderate atherosclerotic vascular calcification. Right external iliac bypass graft.    Retroperitoneum: There is no mass.  Bones: The bones are diffusely osteopenic. The patient is status post ORIF involving the bilateral proximal femurs  Subcutaneous tissues: Unremarkable.    IMPRESSION:    Marked interval improvement in multifocal pneumonia.  Emphysema.    Compared to the recent CT of January 5, 2021, interval development of colitis of the sigmoid colon and rectum, of infectious or inflammatory etiology.    Redemonstration of gallstones.    ALISIA BERNARD MD; Attending Radiologist  This document has been electronically signed. Jan 16 2021  3:44AM    Ventricular Rate 106 BPM    Atrial Rate 79 BPM    QRS Duration 104 ms    Q-T Interval 374 ms    QTC Calculation(Bazett) 496 ms    R Axis -47 degrees    T Axis 57 degrees    Diagnosis Line Atrial fibrillation with rapid ventricular response  Left axis deviation  Septal infarct , age undetermined  Abnormal ECG  When compared with ECG of 05-JAN-2021 12:06,  Septal infarct is now present  Confirmed by Tru Ziegler MD ([id number]) on 1/16/2021 4:36:07 PM

## 2021-01-18 NOTE — PROGRESS NOTE ADULT - PROBLEM SELECTOR PLAN 9
- Known h/o fusiform infrarenal AAA without significant change last evaluated during 1/2020 admission by vascular surgery with no acute intervention at that time  - Recommended outpatient follow up.
- Known h/o fusiform infrarenal AAA without significant change last evaluated during 1/2020 admission by vascular surgery with no acute intervention at that time  - Recommended outpatient follow up.

## 2021-01-18 NOTE — PROGRESS NOTE ADULT - PROBLEM SELECTOR PLAN 5
Mental status stable at present   likely metabolic encepahlopathy in the setting of infection.  Improved, no longer lethargic, more awake and alert  most likely 2/2 covid infection/colitis infection    H/o CVA and aneurysm repair in the past, no residual weakness  - CT Head: No significant interval change compared to the previous CT of January 5, 2021.No intracranial hemorrhage, mass effect or large acute cortical infarct. Focal old left frontal and temporal encephalomalacia. Old bilateral lacunar infarcts.  - Passed bedside dysphagia screen  - Continue home ASA and Lipitor.
Mental status stable at present   likely metabolic encepahlopathy in the setting of infection.  Improved, no longer lethargic, more awake and alert  most likely 2/2 covid infection/colitis infection    H/o CVA and aneurysm repair in the past, no residual weakness  - CT Head: No significant interval change compared to the previous CT of January 5, 2021.No intracranial hemorrhage, mass effect or large acute cortical infarct. Focal old left frontal and temporal encephalomalacia. Old bilateral lacunar infarcts.  - Passed bedside dysphagia screen  - Continue home ASA and Lipitor.

## 2021-01-18 NOTE — PROGRESS NOTE ADULT - PROBLEM SELECTOR PLAN 10
VTE PPX: Pt is on Coumadin for afib, will f/u daily INR and dose accordingly    -GI PPX: Pepcid
VTE PPX: Pt is on Coumadin for afib, will f/u daily INR and dose accordingly    -GI PPX: Pepcid

## 2021-01-19 NOTE — PROGRESS NOTE ADULT - ATTENDING COMMENTS
85F, SNF, HTN, HL, Afib/coumadin, hx CVA/aneurysm repair, infrarenal AAA, PVD/RLE stents, RA; recent admit [facility] 1/5-1/8/21 for multifocal PNA w/d/c on abxs/suppl O2; recent +COVID test on 1/12; adm w/AMS/lethargy, fever, hypoxia - in ED, HR 100s, O2 sat 87 on RA, WBC 11.27, Cr 1.7, INR 1.66, COVID19+; CTH neg, CT chest w/marked improvement in multifocal PNA; CTAP w/interval development of colitis of sigmoid colon and rectum - mgmt for acute hypoxic resp failure, COVID19 PNA, colitis    sepsis present on admit - in setting of covid19 PNA, colitis  -assoc w/tachycardia and leukocytosis  -monitoring for resolution on IV abxs, pulm regimen  -WBCs uptrended 11.27 on admit -> peak 23.13 - now decreasing  -f/u cxs    COVID19 PNA - assoc hypoxic resp failure, sepsis present on admit  -remdesivir held 2/2 renal failure  -on dexamethasone - plan for 10d course  -continued suppl O2 support - on 3L currently - wean as tolerated  -airborne/contact precautions  -trending inflammatory markers  -Pulm/ID following    colitis - assoc w/abd pain, sepsis present on admit  -IV abxs: zosyn -> cefepime, flagyl  -monitoring for diarrhea - plan for check Cdiff if +diarrhea  -tolerating reg diet    BRIDGET   -Cr 1.2 (1/7)->1.4 (1/8) ->1.7 on admit (1/16) ->peak 2.5 - now downtrending  -likely multifactorial in setting of volume depletion, acute infection  -renally dose meds, no nephrotoxics    Afib/RVR, supratherap INR/coumadin toxicity  -in setting of acute infection, use of abxs  -rate controlled on metoprolol  -INR elev 1.66->7.01 now - no e/o active bleeding - reverse gently w/small dose vitamin K 2.5mg po today - trend INR    dvt prophy - therap a/c 85F, SNF, HTN, HL, Afib/coumadin, hx CVA/aneurysm repair, infrarenal AAA, PVD/RLE stents, RA; recent admit [facility] 1/5-1/8/21 for multifocal PNA w/d/c on abxs/suppl O2; recent +COVID test on 1/12; adm w/AMS/lethargy, fever, hypoxia - in ED, HR 100s, O2 sat 87 on RA, WBC 11.27, Cr 1.7, INR 1.66, COVID19+; CTH neg, CT chest w/marked improvement in multifocal PNA; CTAP w/interval development of colitis of sigmoid colon and rectum - mgmt for acute hypoxic resp failure, COVID19 PNA, colitis    sepsis present on admit - in setting of covid19 PNA, colitis  -assoc w/tachycardia and leukocytosis  -monitoring for resolution on IV abxs, pulm regimen  -WBCs uptrended 11.27 on admit -> peak 23.13 - now decreasing  -f/u cxs    COVID19 PNA - assoc hypoxic resp failure, sepsis present on admit  -remdesivir held 2/2 renal failure  -on dexamethasone - plan for 10d course  -continued suppl O2 support - on 3L currently - wean as tolerated  -airborne/contact precautions  -trending inflammatory markers  -Pulm/ID following    colitis - assoc w/abd pain, sepsis present on admit  -IV abxs: zosyn -> cefepime, flagyl  -monitoring for diarrhea - plan for check Cdiff if +diarrhea  -tolerating reg diet    BRIDGET   -Cr 1.2 (1/7)->1.4 (1/8) ->1.7 on admit (1/16) ->peak 2.5 - now downtrending  -likely multifactorial in setting of volume depletion, acute infection  -renally dose meds, no nephrotoxics  -Bateman in place for accurate I/O monitoring in setting of BRIDGET - given downtrending Cr, will remove Bateman    Afib/RVR, supratherap INR/coumadin toxicity  -in setting of acute infection, use of abxs  -rate controlled on metoprolol  -INR elev 1.66->7.01 now - no e/o active bleeding - reverse gently w/small dose vitamin K 2.5mg po today - trend INR    dvt prophy - therap a/c 85F, SNF, HTN, HL, Afib/coumadin, hx CVA/aneurysm repair, infrarenal AAA, PVD/RLE stents, RA; recent admit [facility] 1/5-1/8/21 for multifocal PNA w/d/c on abxs/suppl O2; recent +COVID test on 1/12; adm w/AMS/lethargy, fever, hypoxia - in ED, HR 100s, O2 sat 87 on RA, WBC 11.27, Cr 1.7, INR 1.66, COVID19+; CTH neg, CT chest w/marked improvement in multifocal PNA; CTAP w/interval development of colitis of sigmoid colon and rectum - mgmt for acute hypoxic resp failure, COVID19 PNA, colitis    sepsis present on admit - in setting of covid19 PNA, colitis  -assoc w/tachycardia and leukocytosis  -monitoring for resolution on IV abxs, pulm regimen  -WBCs uptrended 11.27 on admit -> peak 23.13 - now decreasing  -f/u cxs    COVID19 PNA - assoc hypoxic resp failure, sepsis present on admit  -remdesivir held 2/2 renal failure  -on dexamethasone - plan for 10d course  -continued suppl O2 support - on 3L currently - wean as tolerated  -airborne/contact precautions  -trending inflammatory markers  -Pulm/ID following    colitis - assoc w/abd pain, sepsis present on admit  -IV abxs: zosyn -> cefepime, flagyl  -monitoring for diarrhea - plan for check Cdiff if +diarrhea  -tolerating reg diet    BRIDGET   -Cr 1.2 (1/7)->1.4 (1/8) ->1.7 on admit (1/16) ->peak 2.5 - now downtrending  -likely multifactorial in setting of volume depletion, acute infection  -renally dose meds, no nephrotoxics  -Bateman in place for accurate I/O monitoring in setting of BRIDGET as unable to monitor output - given downtrending Cr -plan for Bateman removal if Cr continues to improve    Afib/RVR, supratherap INR/coumadin toxicity  -in setting of acute infection, use of abxs  -rate controlled on metoprolol  -INR elev 1.66->7.01 now - no e/o active bleeding - reverse gently w/small dose vitamin K 2.5mg po today - trend INR    dvt prophy - therap a/c

## 2021-01-19 NOTE — PROGRESS NOTE ADULT - ASSESSMENT
BRIDGET: Prerenal azotemia, ATN  COVID +, Pneumonia  Colitis  Hypertension now with hypotension  Coagulopathy    Improving renal indices. Continue gentle IV hydration. Change to half NS. Strict i & O. Rx for COVID.   Avoid nephrotoxic meds as possible. Monitor BP trend. Will follow electrolytes and renal function trend.

## 2021-01-19 NOTE — PROGRESS NOTE ADULT - SUBJECTIVE AND OBJECTIVE BOX
INTERVAL HPI/OVERNIGHT EVENTS:  chart reviewed,  no diarrhea    MEDICATIONS  (STANDING):  ascorbic acid 500 milliGRAM(s) Oral daily  aspirin enteric coated 81 milliGRAM(s) Oral daily  atorvastatin 40 milliGRAM(s) Oral at bedtime  budesonide  80 MICROgram(s)/formoterol 4.5 MICROgram(s) Inhaler 2 Puff(s) Inhalation two times a day  cefepime   IVPB      cefepime   IVPB 1000 milliGRAM(s) IV Intermittent every 24 hours  dexAMETHasone  Injectable 6 milliGRAM(s) IV Push daily  diltiazem    milliGRAM(s) Oral daily  gabapentin 100 milliGRAM(s) Oral three times a day  lactobacillus acidophilus 1 Tablet(s) Oral three times a day  metoprolol tartrate 12.5 milliGRAM(s) Oral every 6 hours  metroNIDAZOLE  IVPB      metroNIDAZOLE  IVPB 500 milliGRAM(s) IV Intermittent every 8 hours  multivitamin 1 Tablet(s) Oral daily  pantoprazole    Tablet 40 milliGRAM(s) Oral every 12 hours  sodium chloride 0.9%. 1000 milliLiter(s) (60 mL/Hr) IV Continuous <Continuous>  sodium chloride 0.9%. 1000 milliLiter(s) (60 mL/Hr) IV Continuous <Continuous>  tiotropium 18 MICROgram(s) Capsule 1 Capsule(s) Inhalation daily    MEDICATIONS  (PRN):  acetaminophen   Tablet .. 650 milliGRAM(s) Oral every 6 hours PRN Temp greater or equal to 38C (100.4F), Mild Pain (1 - 3)  ALBUTerol    90 MICROgram(s) HFA Inhaler 2 Puff(s) Inhalation every 3 hours PRN Shortness of Breath and/or Wheezing  guaiFENesin   Syrup  (Sugar-Free) 200 milliGRAM(s) Oral every 6 hours PRN Cough      Allergies    No Known Allergies    Intolerances        Review of Systems:  tele f/u      Vital Signs Last 24 Hrs  T(C): 36.5 (18 Jan 2021 04:40), Max: 36.6 (18 Jan 2021 01:26)  T(F): 97.7 (18 Jan 2021 04:40), Max: 97.9 (18 Jan 2021 01:26)  HR: 95 (18 Jan 2021 04:40) (87 - 111)  BP: 136/89 (18 Jan 2021 04:40) (114/76 - 136/89)  BP(mean): --  RR: 17 (18 Jan 2021 04:40) (16 - 17)  SpO2: 95% (18 Jan 2021 04:40) (94% - 98%)    PHYSICAL EXAM:    tele f/u    LABS:                        10.0   23.13 )-----------( 230      ( 18 Jan 2021 06:24 )             31.7     01-18    144  |  115<H>  |  72<H>  ----------------------------<  119<H>  3.9   |  19<L>  |  2.40<H>    Ca    7.5<L>      18 Jan 2021 06:24    TPro  6.0  /  Alb  2.2<L>  /  TBili  0.3  /  DBili  x   /  AST  36  /  ALT  17  /  AlkPhos  78  01-18    PT/INR - ( 18 Jan 2021 06:24 )   PT: 66.4 sec;   INR: 6.19 ratio         PTT - ( 18 Jan 2021 06:24 )  PTT:42.3 sec      RADIOLOGY & ADDITIONAL TESTS:

## 2021-01-19 NOTE — PROGRESS NOTE ADULT - SUBJECTIVE AND OBJECTIVE BOX
[facility] -- Dahiana Bañuelos, Evens, Toney, Truman Falcno Savella, Goodger  Office # [phone]    Follow Up:  Afib, SOB with hypoxia    Subjective/Observations: Admits to have had a good night.  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    Osteoarthritis    Afib    PVD (peripheral vascular disease)    Neuropathy  (Right lower leg)    H/O cerebral aneurysm repair  brain clips    CVA (cerebral vascular accident)  (&quot;Mini-stroke&quot;,1990&#x27;s)    Rheumatoid arthritis    Hyperlipidemia    Hypertension    S/P cataract surgery  (Left eye)    Elective surgery  (&quot;Twisted bowel&quot;, 2014)    Elective surgery  (Exision of cyst on liver, 1985)    S/P ORIF (open reduction internal fixation) fracture  Left hip fx (2012) &amp; R hip fx (2013)    H/O cerebral aneurysm repair  Brain clips (1978(    Renal stone  Cysto stent placement 10/1/2014    PVD (peripheral vascular disease)  s/p RLE bypass x 3, most recent 3/2012 Right external iliac to PT bypass w/ PTFE (2012)    S/P FRED (total abdominal hysterectomy)  (1987, Hx of &quot;ovarian cancer?&quot;)    MEDICATIONS  (STANDING):  ascorbic acid 500 milliGRAM(s) Oral daily  aspirin enteric coated 81 milliGRAM(s) Oral daily  atorvastatin 40 milliGRAM(s) Oral at bedtime  budesonide  80 MICROgram(s)/formoterol 4.5 MICROgram(s) Inhaler 2 Puff(s) Inhalation two times a day  cefepime   IVPB      cefepime   IVPB 1000 milliGRAM(s) IV Intermittent every 24 hours  dexAMETHasone  Injectable 6 milliGRAM(s) IV Push daily  diltiazem    milliGRAM(s) Oral daily  gabapentin 100 milliGRAM(s) Oral three times a day  lactobacillus acidophilus 1 Tablet(s) Oral three times a day  metoprolol tartrate 12.5 milliGRAM(s) Oral every 6 hours  metroNIDAZOLE  IVPB      metroNIDAZOLE  IVPB 500 milliGRAM(s) IV Intermittent every 8 hours  multivitamin 1 Tablet(s) Oral daily  pantoprazole    Tablet 40 milliGRAM(s) Oral every 12 hours  sodium chloride 0.9%. 1000 milliLiter(s) (60 mL/Hr) IV Continuous <Continuous>  tiotropium 18 MICROgram(s) Capsule 1 Capsule(s) Inhalation daily    MEDICATIONS  (PRN):  acetaminophen   Tablet .. 650 milliGRAM(s) Oral every 6 hours PRN Temp greater or equal to 38C (100.4F), Mild Pain (1 - 3)  ALBUTerol    90 MICROgram(s) HFA Inhaler 2 Puff(s) Inhalation every 3 hours PRN Shortness of Breath and/or Wheezing  guaiFENesin   Syrup  (Sugar-Free) 200 milliGRAM(s) Oral every 6 hours PRN Cough    Allergies    No Known Allergies    Intolerances    Vital Signs Last 24 Hrs  T(C): 36.4 (19 Jan 2021 04:50), Max: 36.7 (19 Jan 2021 00:55)  T(F): 97.6 (19 Jan 2021 04:50), Max: 98 (19 Jan 2021 00:55)  HR: 73 (19 Jan 2021 04:50) (71 - 73)  BP: 129/72 (19 Jan 2021 04:50) (98/54 - 129/72)  BP(mean): --  RR: 20 (19 Jan 2021 04:50) (16 - 20)  SpO2: 94% (19 Jan 2021 04:50) (92% - 98%)  I&O's Summary    18 Jan 2021 07:01  -  19 Jan 2021 07:00  --------------------------------------------------------  IN: 0 mL / OUT: 900 mL / NET: -900 mL    PHYSICAL EXAM:  TELE: Not on tele  Constitutional: NAD, awake, well-developed  HEENT: Moist Mucous Membranes, Anicteric  Pulmonary: Non-labored, breath sounds are clear bilaterally, No wheezing, rales or rhonchi  Cardiovascular: IRRR, S1 and S2, + murmurs, no rubs, gallops or clicks  Gastrointestinal: Bowel Sounds present, soft, nontender.   Lymph: No peripheral edema. No lymphadenopathy.  Skin: No visible rashes or ulcers.  Pale in color  Psych:  Mood & affect: flat      LABS: All Labs Reviewed:                        9.5    18.49 )-----------( 209      ( 19 Jan 2021 10:21 )             30.4                         10.0   23.13 )-----------( 230      ( 18 Jan 2021 06:24 )             31.7                         10.7   18.13 )-----------( 225      ( 17 Jan 2021 05:43 )             34.5     18 Jan 2021 06:24    144    |  115    |  72     ----------------------------<  119    3.9     |  19     |  2.40   17 Jan 2021 05:43    142    |  112    |  60     ----------------------------<  119    4.1     |  19     |  2.50     Ca    7.5        18 Jan 2021 06:24  Ca    7.5        17 Jan 2021 05:43    TPro  6.0    /  Alb  2.2    /  TBili  0.3    /  DBili  x      /  AST  36     /  ALT  17     /  AlkPhos  78     18 Jan 2021 06:24  TPro  5.9    /  Alb  2.1    /  TBili  0.3    /  DBili  x      /  AST  37     /  ALT  15     /  AlkPhos  81     17 Jan 2021 05:43    PT/INR - ( 18 Jan 2021 06:24 )   PT: 66.4 sec;   INR: 6.19 ratio    PTT - ( 18 Jan 2021 06:24 )  PTT:42.3 sec     EXAM:  ECHO TTE WO CON COMP W DOPP         PROCEDURE DATE:  01/06/2021        INTERPRETATION:  INDICATION: Palpitations  Sonographer     Blood Pressure 149/83    Height 154 cm     Weight 63 kg       BSA 1.6 sq m    Dimensions:  LA 4.4       Normal Values: 2.0 - 4.0 cm  Ao 2.6        Normal Values: 2.0 - 3.8 cm  SEPTUM 0.7       Normal Values: 0.6 - 1.2 cm  PWT 0.8       Normal Values: 0.6 - 1.1 cm  LVIDd 4.8         Normal Values: 3.0 - 5.6 cm  LVIDs 3.3         Normal Values: 1.8 - 4.0 cm      OBSERVATIONS:  Technically difficult study  Mitral Valve: Thickened leaflets, moderate MR.  Aortic Valve/Aorta: Sclerotic trileaflet aortic valve with normal opening. Mild AI  Tricuspid Valve: normal with trace TR.  Pulmonic Valve: Mild PI  Left Atrium: Enlarged  Right Atrium: Not well-visualized  Left Ventricle: Low-normal left ventricular systolic function, estimated LVEF of 50%.  Right Ventricle: Grossly normal size and systolic function.  Pericardium/Pleura: normal, no significant pericardial effusion.  Pulmonary/RV Pressure: estimated PA systolic pressure of 29 mmHg    Conclusion:  Technically difficult study  Low-normal left ventricular systolic function, estimated LVEF of 50%.  Grossly normal RV size and systolic function.  Left atrial enlargement  Sclerotic trileaflet aortic valve, mild AI.  Moderate MR  Mild TR.      JACQUELINE GOODEN MD; Attending Cardiologist  This document has been electronically signed. Jan 7 2021  5:51PM    EXAM:  CT ABDOMEN AND PELVIS                          EXAM:  CT CHEST                          PROCEDURE DATE:  01/16/2021      INTERPRETATION:  CT of the chest, abdomen and pelvis    Indication: [Hypoxia], unresponsive, fever    Technique: Axial images were obtained from the thoracic inlet through pubic symphysis without oral or IV contrast. Reformatted coronal and sagittal images were submitted.    Comparison: CT of 1/5/21    FINDINGS:    Evaluation of the solid abdominal organs is limited without contrast.    A 1.2 cm left thyroid hypodensity.  The visualized axilla and subcutaneous tissues are unremarkable.    The tracheobronchial tree is patent centrally.  There is no mediastinal hematoma.  The great vessels are not enlarged.  There are nonspecific mediastinal lymph nodes are unchanged, measuring 1.3 x 1.5 cm in the left paratracheal region and is stable and 1.1 x 0.8 cm in the right paratracheal region.  Atherosclerotic calcifications of the thoracic aorta and coronary arteries.    The heart is not enlarged.  There is no pericardial effusion.    Lungs: Mild to moderate centrilobular emphysema.  Previously visualized right middle and lower lobe and left upper lobe opacities are significantly decreased compared to the previous study of January 5, 2021. There are mild patchy opacities at the lung bases.    Pleura: Trace right pleural effusion.  There is no free air or focal collection.  No free fluid.    Liver: Normal.  Spleen: Normal.  Gallbladder: Moderately distendedwith gallstones.  Biliary tree: Unremarkable.  Adrenal glands: Normal.  Pancreas: Normal.  Kidneys: No hydronephrosis or obstructing stones. Nonobstructive right renal stones measure up to 7 mm.    Bowel:  There is no small bowel obstruction.  There is moderate thickening of the sigmoid colon and rectum compatible with colitis. There is surrounding inflammation without free fluid.    Bladder: Decompressed.  Pelvic organs: No pelvic masses.    There is no significant adenopathy.  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 Moderate atherosclerotic vascular calcification. Right external iliac bypass graft.    Retroperitoneum: There is no mass.  Bones: The bones are diffusely osteopenic. The patient is status post ORIF involving the bilateral proximal femurs  Subcutaneous tissues: Unremarkable.    IMPRESSION:    Marked interval improvement in multifocal pneumonia.  Emphysema.    Compared to the recent CT of January 5, 2021, interval development of colitis of the sigmoid colon and rectum, of infectious or inflammatory etiology.    Redemonstration of gallstones.    ALISIA BERNARD MD; Attending Radiologist  This document has been electronically signed. Jan 16 2021  3:44AM    Ventricular Rate 106 BPM    Atrial Rate 79 BPM    QRS Duration 104 ms    Q-T Interval 374 ms    QTC Calculation(Bazett) 496 ms    R Axis -47 degrees    T Axis 57 degrees    Diagnosis Line Atrial fibrillation with rapid ventricular response  Left axis deviation  Septal infarct , age undetermined  Abnormal ECG  When compared with ECG of 05-JAN-2021 12:06,  Septal infarct is now present  Confirmed by Tru Ziegler MD ([id number]) on 1/16/2021 4:36:07 PM

## 2021-01-19 NOTE — PROGRESS NOTE ADULT - PROBLEM SELECTOR PROBLEM 4
Altered mental status, unspecified altered mental status type
Hypotension, unspecified hypotension type
Hypotension, unspecified hypotension type

## 2021-01-19 NOTE — GOALS OF CARE CONVERSATION - ADVANCED CARE PLANNING - CONVERSATION DETAILS
Writer spoke withdtr/HCP Dixie Schmitt. Reviewed patient's medical and social history as well as events leading to patient's hospitalization. Writer discussed patient's current diagnosis (Covid,MVP,HTN,PVD,A Fib), medical condition and management, prognosis, and life expectancy. Inquired about patient's wishes regarding extent of medical care to be provided including escalation of medical care into the ICU and use of vasopressor support. In addition, the writer inquired about thoughts regarding cardiopulmnary resuscitation, artificial nutrition and hydration including use of feeding tubes and IVF, antibiotics, and further investigative studies such as blood draws and radiology. Dixie showed some insight into medical condition. All questions answered. Writer recommended she have a conversation with her mother to know her wishes at this time) Psychosocial support provided.

## 2021-01-19 NOTE — PROGRESS NOTE ADULT - ASSESSMENT
85f well known to our office and sees Dr. Laughlin.  She has afib (on Coumadin), GIB, CVA and h/o intracranial aneurysm repair, infrarenal AAA, HTN, HLD, mitral valve prolapse, PVD of RLE with failed bypass, failed redo bypass and failed multiple stents, with persistent occlusion.  She has rheumatoid arthritis, s/p hysterectomy, and L cataract.  She is known to have a borderline lvef of 50 by echo done 1 week ago when she was hosp with pna and an acute abd process.  At that time she was more rapid af given that she was not taking her meds, and her meds were adjusted to include metop 12.5 q6 and dilt 240.  Metop was changed to xl 100 at [facility] from [facility] for hypoxia, fever and lethargy. She has been treated for COVID pna/resp failure and colitis. She is being treated with decadron for COVID, but her BRIDGET precludes her from receiving remdesivir.  GI/ID following for colitis and recently switched to cefepime and flagyl.  She has had worsened issues with hypoxia and hypotension, and both icu and cardiology were consulted this am.      She does not report any sig sxs now.  She does say her abdomen is sore.  She is aware that she is covid +    Afib with RVR  - No evidence of acute ischemia or volume overload  - Rate-controlled overnight  - Known PAD and presumably has CAD, but no evidence of acute ischemia  - Was initially hypotensive but now BP is significantly improved   - Continue BB and CCB  - Continue to hold Coumadin in the setting of supratherapeutic INR (6.19).  Today's level pending  - Monitor electrolytes, replete to keep K>4 and Mag>2  - TTE this month showed low-normal LVSF, EF 50%, normal RVF, with moderate MR.  No need to repeat unless change in cardiac status    Covid Pna  - Supplemental O2  - Supportive care  - Continue current management  - Please, initiate incentive spirometer  - Follow ID and Pulm recs    Supratherapeutic INR  - Was last dosed with Coumadin for Afib on 1/15  - Yesterday's level = 6.19.  Will continue to trend  - Patient with colitis.  No evidence of overt bleeding, though, with some streaks of blood in last night's BM.  Can hold ASA if needed  - Monitor H/H  - GI following    Hx of CVA  - Continue ASA for now  - Continue statin    BRIDGET  - Renal function worsening with creatinine of 2.4.  Today's is pending  - Continue to trend and avoid nephrotoxics  - Follow renal recs  - Okay to give hydration in the setting of poor PO intake while monitoring volume status    Further cardiac w/u pending clinical course  Will continue to follow      Isabel Quintero DNP, NP-C  Cardiology   Spectra #[phone]

## 2021-01-19 NOTE — PROGRESS NOTE ADULT - SUBJECTIVE AND OBJECTIVE BOX
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    Osteoarthritis    Afib    PVD (peripheral vascular disease)    Neuropathy  (Right lower leg)    Gout    H/O cerebral aneurysm repair  brain clips    CVA (cerebral vascular accident)  (&quot;Mini-stroke&quot;,1990&#x27;s)    Rheumatoid arthritis    Asthma    Hyperlipidemia    Hypertension    S/P cataract surgery  (Left eye)    Elective surgery  (&quot;Twisted bowel&quot;, 2014)    Elective surgery  (Exision of cyst on liver, 1985)    S/P ORIF (open reduction internal fixation) fracture  Left hip fx (2012) &amp; R hip fx (2013)    H/O cerebral aneurysm repair  Brain clips (1978(    Renal stone  Cysto stent placement 10/1/2014    PVD (peripheral vascular disease)  s/p RLE bypass x 3, most recent 3/2012 Right external iliac to PT bypass w/ PTFE (2012)    S/P FRED (total abdominal hysterectomy)  (1987, Hx of &quot;ovarian cancer?&quot;)          Medication list         MEDICATIONS  (STANDING):  ascorbic acid 500 milliGRAM(s) Oral daily  aspirin enteric coated 81 milliGRAM(s) Oral daily  atorvastatin 40 milliGRAM(s) Oral at bedtime  budesonide  80 MICROgram(s)/formoterol 4.5 MICROgram(s) Inhaler 2 Puff(s) Inhalation two times a day  cefepime   IVPB      cefepime   IVPB 1000 milliGRAM(s) IV Intermittent every 24 hours  dexAMETHasone  Injectable 6 milliGRAM(s) IV Push daily  diltiazem    milliGRAM(s) Oral daily  gabapentin 100 milliGRAM(s) Oral three times a day  lactobacillus acidophilus 1 Tablet(s) Oral three times a day  metoprolol tartrate 12.5 milliGRAM(s) Oral every 6 hours  metroNIDAZOLE  IVPB      metroNIDAZOLE  IVPB 500 milliGRAM(s) IV Intermittent every 8 hours  multivitamin 1 Tablet(s) Oral daily  pantoprazole    Tablet 40 milliGRAM(s) Oral every 12 hours  sodium chloride 0.9%. 1000 milliLiter(s) (60 mL/Hr) IV Continuous <Continuous>  tiotropium 18 MICROgram(s) Capsule 1 Capsule(s) Inhalation daily    MEDICATIONS  (PRN):  acetaminophen   Tablet .. 650 milliGRAM(s) Oral every 6 hours PRN Temp greater or equal to 38C (100.4F), Mild Pain (1 - 3)  ALBUTerol    90 MICROgram(s) HFA Inhaler 2 Puff(s) Inhalation every 3 hours PRN Shortness of Breath and/or Wheezing  guaiFENesin   Syrup  (Sugar-Free) 200 milliGRAM(s) Oral every 6 hours PRN Cough         Vitals log        ICU Vital Signs Last 24 Hrs  T(C): 36.4 (19 Jan 2021 04:50), Max: 36.7 (19 Jan 2021 00:55)  T(F): 97.6 (19 Jan 2021 04:50), Max: 98 (19 Jan 2021 00:55)  HR: 73 (19 Jan 2021 04:50) (71 - 87)  BP: 129/72 (19 Jan 2021 04:50) (98/54 - 129/72)  BP(mean): --  ABP: --  ABP(mean): --  RR: 20 (19 Jan 2021 04:50) (16 - 20)  SpO2: 94% (19 Jan 2021 04:50) (92% - 98%)           Input and Output:  I&O's Detail    17 Jan 2021 07:01  -  18 Jan 2021 07:00  --------------------------------------------------------  IN:    IV PiggyBack: 100 mL    Oral Fluid: 370 mL  Total IN: 470 mL    OUT:    Indwelling Catheter - Urethral (mL): 90 mL    Voided (mL): 100 mL  Total OUT: 190 mL    Total NET: 280 mL      18 Jan 2021 07:01  -  19 Jan 2021 06:34  --------------------------------------------------------  IN:  Total IN: 0 mL    OUT:    Indwelling Catheter - Urethral (mL): 900 mL  Total OUT: 900 mL    Total NET: -900 mL          Lab Data                        10.0   23.13 )-----------( 230      ( 18 Jan 2021 06:24 )             31.7     01-18    144  |  115<H>  |  72<H>  ----------------------------<  119<H>  3.9   |  19<L>  |  2.40<H>    Ca    7.5<L>      18 Jan 2021 06:24    TPro  6.0  /  Alb  2.2<L>  /  TBili  0.3  /  DBili  x   /  AST  36  /  ALT  17  /  AlkPhos  78  01-18            Review of Systems	      Objective     Physical Examination    heart s1s2  lung dec BS  abd soft  on o2 support      Pertinent Lab findings & Imaging      Fransico:  NO   Adequate UO     I&O's Detail    17 Jan 2021 07:01  -  18 Jan 2021 07:00  --------------------------------------------------------  IN:    IV PiggyBack: 100 mL    Oral Fluid: 370 mL  Total IN: 470 mL    OUT:    Indwelling Catheter - Urethral (mL): 90 mL    Voided (mL): 100 mL  Total OUT: 190 mL    Total NET: 280 mL      18 Jan 2021 07:01  -  19 Jan 2021 06:34  --------------------------------------------------------  IN:  Total IN: 0 mL    OUT:    Indwelling Catheter - Urethral (mL): 900 mL  Total OUT: 900 mL    Total NET: -900 mL               Discussed with:     Cultures:	        Radiology

## 2021-01-19 NOTE — PROGRESS NOTE ADULT - ATTENDING COMMENTS
I personally saw and examined the patient in detail.  I have spoken to the above provider regarding the assessment and plan of care.  I reviewed the above assessment and plan of care, and agree.  I have made changes in the body of the note where appropriate.  Patient with supratherapuetic INR with streaking blood in stool.  She is at risk for acute blood loss anemia and decompensation.  Need to trend H/H.  Transfuse as needed.  COntinue to hold COumadin.  To follow closely.

## 2021-01-19 NOTE — PROGRESS NOTE ADULT - SUBJECTIVE AND OBJECTIVE BOX
Patient is a 85y old  Female who presents with a chief complaint of AMS, lethargy (17 Jan 2021 09:12)  Patient seen in follow up for BRIDGET.     On IVF.        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    Osteoarthritis    Afib    PVD (peripheral vascular disease)    Neuropathy    Gout    H/O cerebral aneurysm repair    CVA (cerebral vascular accident)    Rheumatoid arthritis    Asthma    Hyperlipidemia    Hypertension      MEDICATIONS  (STANDING):  ascorbic acid 500 milliGRAM(s) Oral daily  aspirin enteric coated 81 milliGRAM(s) Oral daily  atorvastatin 40 milliGRAM(s) Oral at bedtime  budesonide  80 MICROgram(s)/formoterol 4.5 MICROgram(s) Inhaler 2 Puff(s) Inhalation two times a day  cefepime   IVPB      cefepime   IVPB 1000 milliGRAM(s) IV Intermittent every 24 hours  dexAMETHasone  Injectable 6 milliGRAM(s) IV Push daily  diltiazem    milliGRAM(s) Oral daily  gabapentin 100 milliGRAM(s) Oral three times a day  lactobacillus acidophilus 1 Tablet(s) Oral three times a day  metoprolol tartrate 12.5 milliGRAM(s) Oral every 6 hours  metroNIDAZOLE  IVPB      metroNIDAZOLE  IVPB 500 milliGRAM(s) IV Intermittent every 8 hours  multivitamin 1 Tablet(s) Oral daily  pantoprazole    Tablet 40 milliGRAM(s) Oral every 12 hours  sodium chloride 0.9%. 1000 milliLiter(s) (60 mL/Hr) IV Continuous <Continuous>  tiotropium 18 MICROgram(s) Capsule 1 Capsule(s) Inhalation daily    MEDICATIONS  (PRN):  acetaminophen   Tablet .. 650 milliGRAM(s) Oral every 6 hours PRN Temp greater or equal to 38C (100.4F), Mild Pain (1 - 3)  ALBUTerol    90 MICROgram(s) HFA Inhaler 2 Puff(s) Inhalation every 3 hours PRN Shortness of Breath and/or Wheezing  guaiFENesin   Syrup  (Sugar-Free) 200 milliGRAM(s) Oral every 6 hours PRN Cough    T(C): 36 (01-19-21 @ 11:33), Max: 36.7 (01-19-21 @ 00:55)  HR: 65 (01-19-21 @ 11:33) (65 - 111)  BP: 98/60 (01-19-21 @ 11:33) (98/54 - 136/89)  RR: 19 (01-19-21 @ 11:33)  SpO2: 96% (01-19-21 @ 11:33)  Wt(kg): --  I&O's Detail    18 Jan 2021 07:01  -  19 Jan 2021 07:00  --------------------------------------------------------  IN:  Total IN: 0 mL    OUT:    Indwelling Catheter - Urethral (mL): 900 mL  Total OUT: 900 mL    Total NET: -900 mL            PHYSICAL EXAM:  General: No distress  Respiratory: b/l air entry  Cardiovascular: S1 S2  Gastrointestinal: soft, + tenderness  Extremities:  no edema                                LABORATORY:                        9.5    18.49 )-----------( 209      ( 19 Jan 2021 10:21 )             30.4     01-19    x   |  x   |  66<H>  ----------------------------<  137<H>  x    |  18<L>  |  1.80<H>    Ca    8.1<L>      19 Jan 2021 10:21    TPro  x   /  Alb  2.3<L>  /  TBili  x   /  DBili  x   /  AST  x   /  ALT  17  /  AlkPhos  x   01-19    Sodium, Serum: 144 mmol/L (01-18 @ 06:24)    Potassium, Serum: 3.9 mmol/L (01-18 @ 06:24)    Hemoglobin: 9.5 g/dL (01-19 @ 10:21)  Hemoglobin: 10.0 g/dL (01-18 @ 06:24)  Hemoglobin: 10.7 g/dL (01-17 @ 05:43)    Creatinine, Serum 1.80 (01-19 @ 10:21)  Creatinine, Serum 2.40 (01-18 @ 06:24)  Creatinine, Serum 2.50 (01-17 @ 05:43)        LIVER FUNCTIONS - ( 19 Jan 2021 10:21 )  Alb: 2.3 g/dL / Pro: x     / ALK PHOS: x     / ALT: 17 U/L / AST: x     / GGT: x

## 2021-01-19 NOTE — PROGRESS NOTE ADULT - SUBJECTIVE AND OBJECTIVE BOX
[facility]  INFECTIOUS DISEASES   [address] [place] [address]  Tel: [phone]     Fax: [phone]  =======================================================    CARITO CONSTANTINO [id number]    Follow up: COVID and colitis     On O2 with NC, comfortable, no complaint.  No diarrhea.   No fever. Cultures negative.     Allergies:  No Known Allergies    Antibiotics:    cefepime   IVPB 1000 milliGRAM(s) IV Intermittent every 24 hours    metroNIDAZOLE  IVPB 500 milliGRAM(s) IV Intermittent every 8 hours    REVIEW OF SYSTEMS:  CONSTITUTIONAL:  No Fever or chills  HEENT:   No diplopia or blurred vision.  No earache, sore throat or runny nose.  CARDIOVASCULAR:  No chest pain or SOB  RESPIRATORY:  No cough, shortness of breath, PND or orthopnea.  GASTROINTESTINAL:  No nausea, vomiting or diarrhea.  GENITOURINARY:  No dysuria, frequency or urgency. No Blood in urine  MUSCULOSKELETAL:  no joint aches, no muscle pain  SKIN:  No change in skin, hair or nails.  NEUROLOGIC:  No paresthesias or weakness.  PSYCHIATRIC:  No disorder of thought or mood.  ENDOCRINE:  No heat or cold intolerance, polyuria or polydipsia.  HEMATOLOGICAL:  No easy bruising or bleeding.      Physical Exam:  Vital Signs Last 24 Hrs  T(C): 36.8 (19 Jan 2021 12:02), Max: 36.8 (19 Jan 2021 12:02)  T(F): 98.3 (19 Jan 2021 12:02), Max: 98.3 (19 Jan 2021 12:02)  HR: 68 (19 Jan 2021 12:02) (65 - 73)  BP: 119/66 (19 Jan 2021 12:02) (98/60 - 129/72)  RR: 19 (19 Jan 2021 12:02) (17 - 20)  SpO2: 96% (19 Jan 2021 12:02) (93% - 98%)  GEN: NAD  HEENT: normocephalic and atraumatic. EOMI. GINI.    NECK: Supple.  No lymphadenopathy   LUNGS: Scattered crackles bilaterally   HEART: Regular rate and rhythm   ABDOMEN: Soft, nontender, and nondistended.  Positive bowel sounds.    : No CVA tenderness  EXTREMITIES: Without any cyanosis, clubbing, rash, lesions or edema.  MSK: no joint swelling  NEUROLOGIC: grossly intact.  PSYCHIATRIC: Appropriate affect .  SKIN: No ulceration or induration present.      Labs:                        9.5    18.49 )-----------( 209      ( 19 Jan 2021 10:21 )             30.4      01-19    148<H>  |  119<H>  |  66<H>  ----------------------------<  137<H>  3.4<L>   |  18<L>  |  1.80<H>    Ca    8.1<L>      19 Jan 2021 10:21    TPro  6.0  /  Alb  2.3<L>  /  TBili  0.3  /  DBili  x   /  AST  34  /  ALT  17  /  AlkPhos  104  01-19    Culture - Blood (collected 01-16-21 @ 05:01)  Source: .Blood Blood-Peripheral    Culture - Blood (collected 01-16-21 @ 05:01)  Source: .Blood Blood-Peripheral    Culture - Blood (collected 01-05-21 @ 18:51)  Source: .Blood Blood  Final Report (01-10-21 @ 19:01):    No Growth Final    Culture - Blood (collected 01-05-21 @ 18:51)  Source: .Blood Blood  Final Report (01-10-21 @ 19:01):    No Growth Final    WBC Count: 18.49 K/uL (01-19-21 @ 10:21)  WBC Count: 23.13 K/uL (01-18-21 @ 06:24)  WBC Count: 18.13 K/uL (01-17-21 @ 05:43)  WBC Count: 10.78 K/uL (01-16-21 @ 09:34)  WBC Count: 11.27 K/uL (01-16-21 @ 00:03)    Creatinine, Serum: 1.80 mg/dL (01-19-21 @ 10:21)  Creatinine, Serum: 2.40 mg/dL (01-18-21 @ 06:24)  Creatinine, Serum: 2.50 mg/dL (01-17-21 @ 05:43)  Creatinine, Serum: 1.90 mg/dL (01-16-21 @ 09:34)  Creatinine, Serum: 1.70 mg/dL (01-16-21 @ 00:03)    C-Reactive Protein, Serum: 6.22 mg/dL (01-16-21 @ 14:08)    Ferritin, Serum: 414 ng/mL (01-16-21 @ 14:11)    Procalcitonin, Serum: 1.02 ng/mL (01-16-21 @ 09:34)     COVID-19 IgG Antibody Index: <0.10 Index (01-16-21 @ 13:31)  COVID-19 IgG Antibody Interpretation: Negative (01-16-21 @ 13:31)  COVID-19 PCR: Detected (01-16-21 @ 00:38)  COVID-19 PCR: NotDetec (01-06-21 @ 06:01)  COVID-19 IgG Antibody Index: 0.06 Index (01-05-21 @ 22:41)  COVID-19 IgG Antibody Interpretation: Negative (01-05-21 @ 22:41)  COVID-19 PCR: NotDetec (01-05-21 @ 12:42)    All imaging and data are reviewed.   < from: CT Abdomen and Pelvis No Cont (01.16.21 @ 03:03) >  IMPRESSION:  Marked interval improvement in multifocal pneumonia.  Emphysema.  Compared to the recent CT of January 5, 2021, interval development of colitis of the sigmoid colon and rectum, of infectious or inflammatory etiology.  Redemonstration of gallstones.      Assessment and Plan:   84yo female, with PMH afib (on Coumadin), CVA and h/o aneurysm repair, infrarenal AAA, HTN, HLD, mitral valve prolapse, PVD of RLE s/p stents x3, rheumatoid arthritis, s/p hysterectomy, and L cataract, BIBEMS from [facility] for hypoxia, fever and lethargy. Pt was found to be satting 87% on RA and temperature of 101 F as per facility documentation. Pt tested positive for COVID on 1/12/21. The pt is s/p recent admission to the [facility] 1/5-1/8, was treated for PNA - received Zithromax x 3 days, completed a course of IV Zosyn, discharged home on Augmentin x 7 days. The had a low grade fever of 100.4 yesterday night, on supplemental O2 via NC, lethargic, WBC 11.27 on admission, decreased slightly now, + BRIDGET, with normal lactate, + elevated D-dimer 777, with normal liver enzymes, COVID 19 test is positive. CT head - no change from the study on the previous recent admission.   On admission the pt complained of diffuse abdominal pain, CT chest/abd/pelvis without contrast was performed - marked improvement in multifocal pneumonia, + emphysema, + colititis of sigmoid colon and rectum of inflammatory or infectious etiology, + gallstones that were seen on previous admission, HIDA scan was done then and negative. As per RN, the pt did not have any bowel movements overnight or in the morning.  On exam, the pt is lethargic, on supplemental O2 via NC, able to answer some questions appropriately but drifts into sleep in the middle of the sentence, pt's abdomen is diffusely tender upon palpation especially in the left lower quadrant. The pt was started on antimicrobial therapy on admission with Zosyn IV, remdesivir held secondary to BRIDGET, pt is on Decadron. +    Patient complains of heel pain, exam without local inflammatory signs, warm/well perfused appearing. Postop changes. Does not appear to have limb ischemia. Chest exam with diminished breath sounds, SaO2 NC 93%. . Abdominal exam with significant tenderness LLQ > suprapubic area >> elsewhere. No rebound. + guarding. Deforming arthritis c/w known RA.  Patient denies diarrhea prior to admission or here in the hospital, states passed flatus. At risk for resistant daniele given recent zosyn, antibiotics altered. Need to remain vigilant for C. difficile. Patient w RA, no DMARDS/biologics known to place patient at increased risk for opportunistic pathogens. Patient with prior hx of mesenteric ischemia but pain not out of proportion to physical findings, rectum unsual location to have ischemia given duplicate blood supply. COVID certainly potentially associated with hyperocoagulable states. Merits decadron but will suppress abdominal exam. Remdesivir not shown to impact mortality, in setting of BRIDGET potentially harmful, would avoid.     1/17: no fevers, WBC increased - pt is on steroids, Cr increased - off remdesivir, Decadron continued, pt did not have a bowel movement as per RN and nursing assistant, remain vigilant for C. Diff if the pt will start having bowel movements.   1/18: No fever, leukocytosis worse but on steroid. Blood cultures are negative. Creat 2.4 stable since yesterday but baseline is lower.     1. Sigmoid colitis - no bowel movements   2. COVID 19   3. Fever  4. Leukocytosis  5. BRIDGET    - continue cefepime - renally dosed  - continue Flagyl 500 mg IV q 8 horus  - if pt will start having diarrhea, sent stool sample for C. Diff and start on oral Vancomycin  - No remdesivir - due to abnormal renal function   - continue Decadron x 10 days   - blood cultures NGTD  - monitor pt's temperatures  - monitor WBC  - monitor pt's renal function  - supplemental O2 for O2 SAT >92%  - precautions as per protocol  - anticoagulation as per protocol  - trend inflammatory markers   - pain control   - palliative care consult    Will follow.    Sindi Anthony MD  Division of infectious Diseases  Cell [phone] between 8am and 6pm  After 6pm and over the weekends please call ID service line at [phone].

## 2021-01-19 NOTE — PROGRESS NOTE ADULT - SUBJECTIVE AND OBJECTIVE BOX
CC/F/U for:     HPI:  86yo F, with PMH afib (on Coumadin), CVA and h/o aneurysm repair, infrarenal AAA, HTN, HLD, mitral valve prolapse, PVD of RLE s/p stents x3, rheumatoid arthritis, s/p hysterectomy, and L cataract, BIBEMS from [facility] for hypoxia, fever and lethargy. Pt was found to be satting 87% on RA and temperature of 101 F as per facility documentation. Pt tested positive for COVID on 1/12/21. Pt was lethargic and history is unable to be obtained.  After a few hours when seen, pt was more alert and complaining of diffuse abdominal pain.   Of note, pt was recently admitted to [place] (1/5/21-1/8/21) for multifocal PNA and was discharged on antibiotics and supplemental oxygen.   History was obtained from daughter Dixie.   As per daughter, pt     ED Course:   Vitals: BP: 137/95, HR:103 , Temp: 97.6 F, RR: 16, SpO2: 100% on NRB -->100% on 3L NC, desaturates to 90% on RA   Labs: WBC: 11.27, H/H: 11.2/34.6, neut: 10.35, ddimer: 777, PT/INR: 19/1.66, lactate: 1.0, bicarb: 17, BUN/Cr: 41/1.70, Ca: 7.6, GFR: 27  COVID PCR POSITIVE   VBG: pH: 7.37, pCO2: 36, pO2: 65, HCO3: 21, SpO2: 90%    Imaging:   CXR: left sided infiltrate improved when compared to CXR from 1/5 - pending official read   CT Head: No significant interval change compared to the previous CT of January 5, 2021.No intracranial hemorrhage, mass effect or large acute cortical infarct. Focal old left frontal and temporal encephalomalacia.  Old bilateral lacunar infarcts.  CT Chest: Marked interval improvement in multifocal pneumonia.  Emphysema. Compared to the recent CT of January 5, 2021.  CT Abdomen and Pelvis: Interval development of colitis of the sigmoid colon and rectum, of infectious or inflammatory etiology. Redemonstration of gallstones.  EKG: afib, HR: 105, no ST elevation or depression   Received Tylenol, Zosyn, NS 1L bolus x1 in the ED   (16 Jan 2021 04:50)        INTERVAL HPI/OVERNIGHT EVENTS:  Pt seen and examined at bedside.     Allergies/Intolerance: No Known Allergies      MEDICATIONS  (STANDING):  ascorbic acid 500 milliGRAM(s) Oral daily  aspirin enteric coated 81 milliGRAM(s) Oral daily  atorvastatin 40 milliGRAM(s) Oral at bedtime  budesonide  80 MICROgram(s)/formoterol 4.5 MICROgram(s) Inhaler 2 Puff(s) Inhalation two times a day  cefepime   IVPB      cefepime   IVPB 1000 milliGRAM(s) IV Intermittent every 24 hours  dexAMETHasone  Injectable 6 milliGRAM(s) IV Push daily  diltiazem    milliGRAM(s) Oral daily  gabapentin 100 milliGRAM(s) Oral three times a day  lactobacillus acidophilus 1 Tablet(s) Oral three times a day  metoprolol tartrate 12.5 milliGRAM(s) Oral every 6 hours  metroNIDAZOLE  IVPB      metroNIDAZOLE  IVPB 500 milliGRAM(s) IV Intermittent every 8 hours  multivitamin 1 Tablet(s) Oral daily  pantoprazole    Tablet 40 milliGRAM(s) Oral every 12 hours  sodium chloride 0.45%. 1000 milliLiter(s) (50 mL/Hr) IV Continuous <Continuous>  tiotropium 18 MICROgram(s) Capsule 1 Capsule(s) Inhalation daily    MEDICATIONS  (PRN):  acetaminophen   Tablet .. 650 milliGRAM(s) Oral every 6 hours PRN Temp greater or equal to 38C (100.4F), Mild Pain (1 - 3)  ALBUTerol    90 MICROgram(s) HFA Inhaler 2 Puff(s) Inhalation every 3 hours PRN Shortness of Breath and/or Wheezing  guaiFENesin   Syrup  (Sugar-Free) 200 milliGRAM(s) Oral every 6 hours PRN Cough        ROS: as above; all other systems reviewed and wnl      PHYSICAL EXAMINATION:  Vital Signs Last 24 Hrs  T(C): 36.8 (19 Jan 2021 12:02), Max: 36.8 (19 Jan 2021 12:02)  T(F): 98.3 (19 Jan 2021 12:02), Max: 98.3 (19 Jan 2021 12:02)  HR: 68 (19 Jan 2021 12:02) (65 - 73)  BP: 119/66 (19 Jan 2021 12:02) (98/54 - 129/72)  BP(mean): --  RR: 19 (19 Jan 2021 12:02) (16 - 20)  SpO2: 96% (19 Jan 2021 12:02) (92% - 98%)  CAPILLARY BLOOD GLUCOSE          GENERAL: NAD  HEENT: mucous membranes dry  RESP: respirations unlabored  HEART: HR s  ABDOMEN: soft, ND, NT   EXTREMITIES:  no edema b/l LEs, no calf tenderness  NEURO: awake, alert, interactive; moves all extremities      LABS:                        9.5    18.49 )-----------( 209      ( 19 Jan 2021 10:21 )             30.4     01-19    148<H>  |  119<H>  |  66<H>  ----------------------------<  137<H>  3.4<L>   |  18<L>  |  1.80<H>    Ca    8.1<L>      19 Jan 2021 10:21    TPro  6.0  /  Alb  2.3<L>  /  TBili  0.3  /  DBili  x   /  AST  34  /  ALT  17  /  AlkPhos  104  01-19    PT/INR - ( 19 Jan 2021 10:21 )   PT: 74.8 sec;   INR: 7.01 ratio         PTT - ( 19 Jan 2021 10:21 )  PTT:43.2 sec        RADIOLOGY & ADDITIONAL TESTS:      ASSESSMENT AND PLAN:  85y Female CC/F/U for: COVID19 PNA, colitis    HPI:  84yo F, with PMH afib (on Coumadin), CVA and h/o aneurysm repair, infrarenal AAA, HTN, HLD, mitral valve prolapse, PVD of RLE s/p stents x3, rheumatoid arthritis, s/p hysterectomy, and L cataract, BIBEMS from [facility] for hypoxia, fever and lethargy. Pt was found to be satting 87% on RA and temperature of 101 F as per facility documentation. Pt tested positive for COVID on 1/12/21. Pt was lethargic and history is unable to be obtained.  After a few hours when seen, pt was more alert and complaining of diffuse abdominal pain.   Of note, pt was recently admitted to [place] (1/5/21-1/8/21) for multifocal PNA and was discharged on antibiotics and supplemental oxygen.   History was obtained from daughter Dixie.       ED Course:   Vitals: BP: 137/95, HR:103 , Temp: 97.6 F, RR: 16, SpO2: 100% on NRB -->100% on 3L NC, desaturates to 90% on RA   Labs: WBC: 11.27, H/H: 11.2/34.6, neut: 10.35, ddimer: 777, PT/INR: 19/1.66, lactate: 1.0, bicarb: 17, BUN/Cr: 41/1.70, Ca: 7.6, GFR: 27  COVID PCR POSITIVE   VBG: pH: 7.37, pCO2: 36, pO2: 65, HCO3: 21, SpO2: 90%    Imaging:   CXR: left sided infiltrate improved when compared to CXR from 1/5 - pending official read   CT Head: No significant interval change compared to the previous CT of January 5, 2021.No intracranial hemorrhage, mass effect or large acute cortical infarct. Focal old left frontal and temporal encephalomalacia.  Old bilateral lacunar infarcts.  CT Chest: Marked interval improvement in multifocal pneumonia.  Emphysema. Compared to the recent CT of January 5, 2021.  CT Abdomen and Pelvis: Interval development of colitis of the sigmoid colon and rectum, of infectious or inflammatory etiology. Redemonstration of gallstones.  EKG: afib, HR: 105, no ST elevation or depression   Received Tylenol, Zosyn, NS 1L bolus x1 in the ED   (16 Jan 2021 04:50)      INTERVAL HPI/OVERNIGHT EVENTS:  Pt seen and examined at bedside - denies sob; INR up to  7.0 - no e/o active bleeding.     Allergies/Intolerance: No Known Allergies      MEDICATIONS  (STANDING):  ascorbic acid 500 milliGRAM(s) Oral daily  aspirin enteric coated 81 milliGRAM(s) Oral daily  atorvastatin 40 milliGRAM(s) Oral at bedtime  budesonide  80 MICROgram(s)/formoterol 4.5 MICROgram(s) Inhaler 2 Puff(s) Inhalation two times a day  cefepime   IVPB      cefepime   IVPB 1000 milliGRAM(s) IV Intermittent every 24 hours  dexAMETHasone  Injectable 6 milliGRAM(s) IV Push daily  diltiazem    milliGRAM(s) Oral daily  gabapentin 100 milliGRAM(s) Oral three times a day  lactobacillus acidophilus 1 Tablet(s) Oral three times a day  metoprolol tartrate 12.5 milliGRAM(s) Oral every 6 hours  metroNIDAZOLE  IVPB      metroNIDAZOLE  IVPB 500 milliGRAM(s) IV Intermittent every 8 hours  multivitamin 1 Tablet(s) Oral daily  pantoprazole    Tablet 40 milliGRAM(s) Oral every 12 hours  sodium chloride 0.45%. 1000 milliLiter(s) (50 mL/Hr) IV Continuous <Continuous>  tiotropium 18 MICROgram(s) Capsule 1 Capsule(s) Inhalation daily    MEDICATIONS  (PRN):  acetaminophen   Tablet .. 650 milliGRAM(s) Oral every 6 hours PRN Temp greater or equal to 38C (100.4F), Mild Pain (1 - 3)  ALBUTerol    90 MICROgram(s) HFA Inhaler 2 Puff(s) Inhalation every 3 hours PRN Shortness of Breath and/or Wheezing  guaiFENesin   Syrup  (Sugar-Free) 200 milliGRAM(s) Oral every 6 hours PRN Cough    ROS: as above; all other systems reviewed and wnl    PHYSICAL EXAMINATION:  Vital Signs Last 24 Hrs  T(C): 36.8 (19 Jan 2021 12:02), Max: 36.8 (19 Jan 2021 12:02)  T(F): 98.3 (19 Jan 2021 12:02), Max: 98.3 (19 Jan 2021 12:02)  HR: 68 (19 Jan 2021 12:02) (65 - 73)  BP: 119/66 (19 Jan 2021 12:02) (98/54 - 129/72)  RR: 19 (19 Jan 2021 12:02) (16 - 20)  SpO2: 96% (19 Jan 2021 12:02) (92% - 98%)    GENERAL: frail, elderly female, NAD  HEENT: mucous membranes dry  RESP: respirations unlabored  HEART: HR 60s  ABDOMEN: soft, ND, mild ttp, no rebound, no guarding  EXTREMITIES:  no edema b/l LEs, no calf tenderness  NEURO: awake, alert, interactive; moves all extremities      LABS:                        9.5    18.49 )-----------( 209      ( 19 Jan 2021 10:21 )             30.4     01-19    148<H>  |  119<H>  |  66<H>  ----------------------------<  137<H>  3.4<L>   |  18<L>  |  1.80<H>    Ca    8.1<L>      19 Jan 2021 10:21    TPro  6.0  /  Alb  2.3<L>  /  TBili  0.3  /  DBili  x   /  AST  34  /  ALT  17  /  AlkPhos  104  01-19    PT/INR - ( 19 Jan 2021 10:21 )   PT: 74.8 sec;   INR: 7.01 ratio         PTT - ( 19 Jan 2021 10:21 )  PTT:43.2 sec

## 2021-01-20 NOTE — PROGRESS NOTE ADULT - ASSESSMENT
BRIDGET: Prerenal azotemia, ATN  COVID +, Pneumonia  Colitis  Hypertension now with hypotension  Coagulopathy    Improving renal indices. D/c IVF. Rx for COVID. Avoid nephrotoxic meds as possible. Monitor BP trend.   Will follow electrolytes and renal function trend.

## 2021-01-20 NOTE — PROGRESS NOTE ADULT - ASSESSMENT
85f well known to our office and sees Dr. Laughlin.  She has afib (on Coumadin), GIB, CVA and h/o intracranial aneurysm repair, infrarenal AAA, HTN, HLD, mitral valve prolapse, PVD of RLE with failed bypass, failed redo bypass and failed multiple stents, with persistent occlusion.  She has rheumatoid arthritis, s/p hysterectomy, and L cataract.  She is known to have a borderline lvef of 50 by echo done 1 week ago when she was hosp with pna and an acute abd process.  At that time she was more rapid af given that she was not taking her meds, and her meds were adjusted to include metop 12.5 q6 and dilt 240.  Metop was changed to xl 100 at [employer] from [employer] for hypoxia, fever and lethargy. She has been treated for COVID pna/resp failure and colitis. She is being treated with decadron for COVID, but her BRIDGET precludes her from receiving remdesivir.  GI/ID following for colitis and recently switched to cefepime and flagyl.  She has had worsened issues with hypoxia and hypotension, and both icu and cardiology were consulted this am.        Afib with RVR  - No evidence of acute ischemia or volume overload  - Continue BB and CCB  - Continue to hold Coumadin in the setting of supratherapeutic INR- repeat today   - Monitor electrolytes, replete to keep K>4 and Mag>2  - TTE this month showed low-normal LVSF, EF 50%, normal RVF, with moderate MR.  No need to repeat unless change in cardiac status    Covid Pna  - Supplemental O2  - Supportive care  - Continue current management  - Please, initiate incentive spirometer  - Follow ID and Pulm recs    Supratherapeutic INR  - Was last dosed with Coumadin for Afib on 1/15  -fu am INR  - GI following    Hx of CVA  - Continue ASA for now  - Continue statin    BRIDGET    - Continue to trend and avoid nephrotoxics  - Follow renal recs      Further cardiac w/u pending clinical course  Will continue to follow      Renita LANDONP-C  Cardiology NP  [facility] [phone]

## 2021-01-20 NOTE — PROGRESS NOTE ADULT - SUBJECTIVE AND OBJECTIVE BOX
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    Osteoarthritis    Afib    PVD (peripheral vascular disease)    Neuropathy  (Right lower leg)    Gout    H/O cerebral aneurysm repair  brain clips    CVA (cerebral vascular accident)  (&quot;Mini-stroke&quot;,1990&#x27;s)    Rheumatoid arthritis    Asthma    Hyperlipidemia    Hypertension    S/P cataract surgery  (Left eye)    Elective surgery  (&quot;Twisted bowel&quot;, 2014)    Elective surgery  (Exision of cyst on liver, 1985)    S/P ORIF (open reduction internal fixation) fracture  Left hip fx (2012) &amp; R hip fx (2013)    H/O cerebral aneurysm repair  Brain clips (1978(    Renal stone  Cysto stent placement 10/1/2014    PVD (peripheral vascular disease)  s/p RLE bypass x 3, most recent 3/2012 Right external iliac to PT bypass w/ PTFE (2012)    S/P FRED (total abdominal hysterectomy)  (1987, Hx of &quot;ovarian cancer?&quot;)          Medication list         MEDICATIONS  (STANDING):  ascorbic acid 500 milliGRAM(s) Oral daily  aspirin enteric coated 81 milliGRAM(s) Oral daily  atorvastatin 40 milliGRAM(s) Oral at bedtime  budesonide  80 MICROgram(s)/formoterol 4.5 MICROgram(s) Inhaler 2 Puff(s) Inhalation two times a day  cefepime   IVPB 1000 milliGRAM(s) IV Intermittent every 24 hours  cefepime   IVPB      dexAMETHasone  Injectable 6 milliGRAM(s) IV Push daily  diltiazem    milliGRAM(s) Oral daily  gabapentin 100 milliGRAM(s) Oral three times a day  lactobacillus acidophilus 1 Tablet(s) Oral three times a day  metoprolol tartrate 12.5 milliGRAM(s) Oral every 6 hours  metroNIDAZOLE  IVPB 500 milliGRAM(s) IV Intermittent every 8 hours  metroNIDAZOLE  IVPB      multivitamin 1 Tablet(s) Oral daily  pantoprazole    Tablet 40 milliGRAM(s) Oral every 12 hours  sodium chloride 0.45%. 1000 milliLiter(s) (50 mL/Hr) IV Continuous <Continuous>  tiotropium 18 MICROgram(s) Capsule 1 Capsule(s) Inhalation daily    MEDICATIONS  (PRN):  acetaminophen   Tablet .. 650 milliGRAM(s) Oral every 6 hours PRN Temp greater or equal to 38C (100.4F), Mild Pain (1 - 3)  ALBUTerol    90 MICROgram(s) HFA Inhaler 2 Puff(s) Inhalation every 3 hours PRN Shortness of Breath and/or Wheezing  guaiFENesin   Syrup  (Sugar-Free) 200 milliGRAM(s) Oral every 6 hours PRN Cough         Vitals log        ICU Vital Signs Last 24 Hrs  T(C): 36.4 (20 Jan 2021 04:41), Max: 36.8 (19 Jan 2021 12:02)  T(F): 97.5 (20 Jan 2021 04:41), Max: 98.3 (19 Jan 2021 12:02)  HR: 63 (20 Jan 2021 05:18) (58 - 70)  BP: 152/73 (20 Jan 2021 04:41) (98/60 - 152/73)  BP(mean): --  ABP: --  ABP(mean): --  RR: 17 (20 Jan 2021 04:41) (17 - 20)  SpO2: 92% (20 Jan 2021 05:18) (92% - 98%)           Input and Output:  I&O's Detail    18 Jan 2021 07:01  -  19 Jan 2021 07:00  --------------------------------------------------------  IN:  Total IN: 0 mL    OUT:    Indwelling Catheter - Urethral (mL): 900 mL  Total OUT: 900 mL    Total NET: -900 mL          Lab Data                        9.5    18.49 )-----------( 209      ( 19 Jan 2021 10:21 )             30.4     01-19    148<H>  |  119<H>  |  66<H>  ----------------------------<  137<H>  3.4<L>   |  18<L>  |  1.80<H>    Ca    8.1<L>      19 Jan 2021 10:21    TPro  6.0  /  Alb  2.3<L>  /  TBili  0.3  /  DBili  x   /  AST  34  /  ALT  17  /  AlkPhos  104  01-19            Review of Systems	      Objective     Physical Examination    heart s1s2  lung dc bS  abd soft      Pertinent Lab findings & Imaging      [place]:  NO   Adequate UO     I&O's Detail    18 Jan 2021 07:01  -  19 Jan 2021 07:00  --------------------------------------------------------  IN:  Total IN: 0 mL    OUT:    Indwelling Catheter - Urethral (mL): 900 mL  Total OUT: 900 mL    Total NET: -900 mL               Discussed with:     Cultures:	        Radiology

## 2021-01-20 NOTE — PROGRESS NOTE ADULT - SUBJECTIVE AND OBJECTIVE BOX
INTERVAL HPI/OVERNIGHT EVENTS:  chart reviewed, on nc  yesterdays events noted, sp vit k  dec po  per rn no s/s active gib overnight    MEDICATIONS  (STANDING):  ascorbic acid 500 milliGRAM(s) Oral daily  aspirin enteric coated 81 milliGRAM(s) Oral daily  atorvastatin 40 milliGRAM(s) Oral at bedtime  budesonide  80 MICROgram(s)/formoterol 4.5 MICROgram(s) Inhaler 2 Puff(s) Inhalation two times a day  cefepime   IVPB      cefepime   IVPB 1000 milliGRAM(s) IV Intermittent every 24 hours  dexAMETHasone  Injectable 6 milliGRAM(s) IV Push daily  diltiazem    milliGRAM(s) Oral daily  gabapentin 100 milliGRAM(s) Oral three times a day  lactobacillus acidophilus 1 Tablet(s) Oral three times a day  metoprolol tartrate 12.5 milliGRAM(s) Oral every 6 hours  metroNIDAZOLE  IVPB      metroNIDAZOLE  IVPB 500 milliGRAM(s) IV Intermittent every 8 hours  multivitamin 1 Tablet(s) Oral daily  pantoprazole    Tablet 40 milliGRAM(s) Oral every 12 hours  sodium chloride 0.45%. 1000 milliLiter(s) (50 mL/Hr) IV Continuous <Continuous>  tiotropium 18 MICROgram(s) Capsule 1 Capsule(s) Inhalation daily    MEDICATIONS  (PRN):  acetaminophen   Tablet .. 650 milliGRAM(s) Oral every 6 hours PRN Temp greater or equal to 38C (100.4F), Mild Pain (1 - 3)  ALBUTerol    90 MICROgram(s) HFA Inhaler 2 Puff(s) Inhalation every 3 hours PRN Shortness of Breath and/or Wheezing  guaiFENesin   Syrup  (Sugar-Free) 200 milliGRAM(s) Oral every 6 hours PRN Cough      Allergies    No Known Allergies    Intolerances        Review of Systems:    tele f/u      Vital Signs Last 24 Hrs  T(C): 36.4 (20 Jan 2021 04:41), Max: 36.8 (19 Jan 2021 12:02)  T(F): 97.5 (20 Jan 2021 04:41), Max: 98.3 (19 Jan 2021 12:02)  HR: 63 (20 Jan 2021 05:18) (58 - 70)  BP: 152/73 (20 Jan 2021 04:41) (98/60 - 152/73)  BP(mean): --  RR: 17 (20 Jan 2021 04:41) (17 - 20)  SpO2: 92% (20 Jan 2021 05:18) (92% - 98%)    PHYSICAL EXAM:  tele f/u    LABS:                        9.5    18.49 )-----------( 209      ( 19 Jan 2021 10:21 )             30.4     01-19    148<H>  |  119<H>  |  66<H>  ----------------------------<  137<H>  3.4<L>   |  18<L>  |  1.80<H>    Ca    8.1<L>      19 Jan 2021 10:21    TPro  6.0  /  Alb  2.3<L>  /  TBili  0.3  /  DBili  x   /  AST  34  /  ALT  17  /  AlkPhos  104  01-19    PT/INR - ( 19 Jan 2021 10:21 )   PT: 74.8 sec;   INR: 7.01 ratio         PTT - ( 19 Jan 2021 10:21 )  PTT:43.2 sec      RADIOLOGY & ADDITIONAL TESTS:

## 2021-01-20 NOTE — PROGRESS NOTE ADULT - SUBJECTIVE AND OBJECTIVE BOX
Patient is a 85y old  Female who presents with a chief complaint of AMS, lethargy (17 Jan 2021 09:12)  Patient seen in follow up for BRIDGET.     On IVF.        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    Osteoarthritis    Afib    PVD (peripheral vascular disease)    Neuropathy    Gout    H/O cerebral aneurysm repair    CVA (cerebral vascular accident)    Rheumatoid arthritis    Asthma    Hyperlipidemia    Hypertension    MEDICATIONS  (STANDING):  ascorbic acid 500 milliGRAM(s) Oral daily  aspirin enteric coated 81 milliGRAM(s) Oral daily  atorvastatin 40 milliGRAM(s) Oral at bedtime  budesonide  80 MICROgram(s)/formoterol 4.5 MICROgram(s) Inhaler 2 Puff(s) Inhalation two times a day  cefepime   IVPB      cefepime   IVPB 1000 milliGRAM(s) IV Intermittent every 24 hours  dexAMETHasone  Injectable 6 milliGRAM(s) IV Push daily  diltiazem    milliGRAM(s) Oral daily  gabapentin 100 milliGRAM(s) Oral three times a day  lactobacillus acidophilus 1 Tablet(s) Oral three times a day  metoprolol tartrate 12.5 milliGRAM(s) Oral every 6 hours  metroNIDAZOLE  IVPB 500 milliGRAM(s) IV Intermittent every 8 hours  metroNIDAZOLE  IVPB      multivitamin 1 Tablet(s) Oral daily  pantoprazole    Tablet 40 milliGRAM(s) Oral every 12 hours  sodium chloride 0.45%. 1000 milliLiter(s) (50 mL/Hr) IV Continuous <Continuous>  tiotropium 18 MICROgram(s) Capsule 1 Capsule(s) Inhalation daily    MEDICATIONS  (PRN):  acetaminophen   Tablet .. 650 milliGRAM(s) Oral every 6 hours PRN Temp greater or equal to 38C (100.4F), Mild Pain (1 - 3)  ALBUTerol    90 MICROgram(s) HFA Inhaler 2 Puff(s) Inhalation every 3 hours PRN Shortness of Breath and/or Wheezing  guaiFENesin   Syrup  (Sugar-Free) 200 milliGRAM(s) Oral every 6 hours PRN Cough    T(C): 36.4 (01-20-21 @ 04:41), Max: 36.8 (01-19-21 @ 12:02)  HR: 63 (01-20-21 @ 05:18) (58 - 73)  BP: 152/73 (01-20-21 @ 04:41) (98/60 - 152/73)  RR: 17 (01-20-21 @ 04:41)  SpO2: 92% (01-20-21 @ 05:18)  Wt(kg): --  I&O's Detail          PHYSICAL EXAM:  General: No distress  Respiratory: b/l air entry  Cardiovascular: S1 S2  Gastrointestinal: soft, + tenderness  Extremities:  no edema                         LABORATORY:                        9.2    16.47 )-----------( 203      ( 20 Jan 2021 09:28 )             29.4     01-20    148<H>  |  120<H>  |  54<H>  ----------------------------<  116<H>  3.6   |  18<L>  |  1.50<H>    Ca    8.4<L>      20 Jan 2021 09:28    TPro  6.0  /  Alb  2.3<L>  /  TBili  0.3  /  DBili  x   /  AST  34  /  ALT  17  /  AlkPhos  104  01-19    Sodium, Serum: 148 mmol/L (01-20 @ 09:28)  Sodium, Serum: 148 mmol/L (01-19 @ 10:21)    Potassium, Serum: 3.6 mmol/L (01-20 @ 09:28)  Potassium, Serum: 3.4 mmol/L (01-19 @ 10:21)    Hemoglobin: 9.2 g/dL (01-20 @ 09:28)  Hemoglobin: 9.5 g/dL (01-19 @ 10:21)  Hemoglobin: 10.0 g/dL (01-18 @ 06:24)    Creatinine, Serum 1.50 (01-20 @ 09:28)  Creatinine, Serum 1.80 (01-19 @ 10:21)  Creatinine, Serum 2.40 (01-18 @ 06:24)        LIVER FUNCTIONS - ( 19 Jan 2021 10:21 )  Alb: 2.3 g/dL / Pro: 6.0 g/dL / ALK PHOS: 104 U/L / ALT: 17 U/L / AST: 34 U/L / GGT: x

## 2021-01-20 NOTE — PROGRESS NOTE ADULT - ASSESSMENT
85F, SNF, HTN, HL, Afib/coumadin, hx CVA/aneurysm repair, infrarenal AAA, PVD/RLE stents, RA; admitted for acute hypoxic resp failure, COVID19 PNA, colitis, sepsis and Afib RVR    Sepsis   -In setting of COVID-19 PNA, colitis  -HR normalized, persitent leukocytosis (on steroids)  -Continue on IV abxs, pulmonary regimen  -f/u cxs NGT    COVID19 PNA complicated with  hypoxic resp failure and sepsis   -Remdesivir held 2/2 renal failure  -Continue on dexamethasone - plan for 10d course  -Continued suppl O2 support - on 3.5L currently - wean as tolerated  -Continue airborne/contact precautions  -Continue trending inflammatory markers as clinically necessary  -Pulm/ID following    Colitis  - assoc w/abd pain   -Started on IV abxs: zosyn and downgraded to cefepime and flagyl  -monitoring for diarrhea - plan for check Cdiff if +diarrhea  -tolerating reg diet  -GI following     BRIDGET   -likely multifactorial :Prerenal azotemia, ATN  -Cr 2.4 (1/18)->1.8 (1/19) ->1.5 (1/20)---- now downtrending,   -renally dose meds, avoid nephrotoxics  -Bateman in place for accurate I/O monitoring , if continues downtrending Cr -plan for Bateman removal   -Nephrology following     Leukocytosis  -Afebrile, BCx negative  -On steroids treatment  -continue trending     Afib/RVR  -In setting of acute infection  -No evidence of acute ischemia, no volume overload  -remains rate controlled on metoprolol, warfarin on hold due to supratherap INR  -Cardiology following , appreciate recommendations  -Continue monitoring lytes    Supratherap INR/coumadin toxicity  -INR elev 7.01-->1.7 after single dose of Vit K  -Continue trending INR    Spoked with patient's daughter Dixie Schmitt @ [phone], gave her and update about her mother.

## 2021-01-20 NOTE — ADVANCED PRACTICE NURSE CONSULT - ASSESSMENT
RN assessed patient to have a stage one pressure injury: RN educated in the care of a patient with a stage one and stage two pressure injury.

## 2021-01-20 NOTE — PROGRESS NOTE ADULT - SUBJECTIVE AND OBJECTIVE BOX
[facility]  INFECTIOUS DISEASES   [address] [place] [address]  Tel: [phone]     Fax: [phone]  =======================================================    CARITO CONSTANTINO [id number]    Follow up: COVID and colitis     On O2 with NC, comfortable.  No fever. Cultures negative.     Allergies:  No Known Allergies    Antibiotics:    cefepime   IVPB 1000 milliGRAM(s) IV Intermittent every 24 hours    metroNIDAZOLE  IVPB 500 milliGRAM(s) IV Intermittent every 8 hours    REVIEW OF SYSTEMS:  CONSTITUTIONAL:  No Fever or chills  HEENT:   No diplopia or blurred vision.  No earache, sore throat or runny nose.  CARDIOVASCULAR:  No chest pain or SOB  RESPIRATORY:  No cough, shortness of breath, PND or orthopnea.  GASTROINTESTINAL:  No nausea, vomiting or diarrhea.  GENITOURINARY:  No dysuria, frequency or urgency. No Blood in urine  MUSCULOSKELETAL:  no joint aches, no muscle pain  SKIN:  No change in skin, hair or nails.  NEUROLOGIC:  No paresthesias or weakness.  PSYCHIATRIC:  No disorder of thought or mood.  ENDOCRINE:  No heat or cold intolerance, polyuria or polydipsia.  HEMATOLOGICAL:  No easy bruising or bleeding.      Physical Exam:  Vital Signs Last 24 Hrs  T(C): 36.4 (20 Jan 2021 04:41), Max: 36.4 (19 Jan 2021 15:59)  T(F): 97.5 (20 Jan 2021 04:41), Max: 97.5 (19 Jan 2021 15:59)  HR: 63 (20 Jan 2021 05:18) (58 - 70)  BP: 152/73 (20 Jan 2021 04:41) (98/60 - 152/73)  BP(mean): --  RR: 17 (20 Jan 2021 04:41) (17 - 20)  SpO2: 92% (20 Jan 2021 05:18) (92% - 98%)  GEN: NAD  HEENT: normocephalic and atraumatic. EOMI. GINI.    NECK: Supple.  No lymphadenopathy   LUNGS: Scattered crackles bilaterally   HEART: Regular rate and rhythm   ABDOMEN: Soft, nontender, and nondistended.  Positive bowel sounds.    : No CVA tenderness  EXTREMITIES: Without edema.  MSK: no joint swelling  NEUROLOGIC: grossly intact.  PSYCHIATRIC: Appropriate affect .  SKIN: No ulceration or induration present.      Labs:                        9.2    16.47 )-----------( 203      ( 20 Jan 2021 09:28 )             29.4      01-20    148<H>  |  120<H>  |  54<H>  ----------------------------<  116<H>  3.6   |  18<L>  |  1.50<H>    Ca    8.4<L>      20 Jan 2021 09:28    TPro  6.0  /  Alb  2.3<L>  /  TBili  0.3  /  DBili  x   /  AST  34  /  ALT  17  /  AlkPhos  104  01-19    Culture - Blood (collected 01-16-21 @ 05:01)  Source: .Blood Blood-Peripheral    Culture - Blood (collected 01-16-21 @ 05:01)  Source: .Blood Blood-Peripheral    WBC Count: 16.47 K/uL (01-20-21 @ 09:28)  WBC Count: 18.49 K/uL (01-19-21 @ 10:21)  WBC Count: 23.13 K/uL (01-18-21 @ 06:24)  WBC Count: 18.13 K/uL (01-17-21 @ 05:43)  WBC Count: 10.78 K/uL (01-16-21 @ 09:34)  WBC Count: 11.27 K/uL (01-16-21 @ 00:03)    Creatinine, Serum: 1.50 mg/dL (01-20-21 @ 09:28)  Creatinine, Serum: 1.80 mg/dL (01-19-21 @ 10:21)  Creatinine, Serum: 2.40 mg/dL (01-18-21 @ 06:24)  Creatinine, Serum: 2.50 mg/dL (01-17-21 @ 05:43)  Creatinine, Serum: 1.90 mg/dL (01-16-21 @ 09:34)  Creatinine, Serum: 1.70 mg/dL (01-16-21 @ 00:03)    C-Reactive Protein, Serum: 1.63 mg/dL (01-19-21 @ 16:47)  C-Reactive Protein, Serum: 6.22 mg/dL (01-16-21 @ 14:08)    Ferritin, Serum: 656 ng/mL (01-19-21 @ 16:34)  Ferritin, Serum: 414 ng/mL (01-16-21 @ 14:11)    Procalcitonin, Serum: 1.02 ng/mL (01-16-21 @ 09:34)     COVID-19 IgG Antibody Index: <0.10 Index (01-16-21 @ 13:31)  COVID-19 IgG Antibody Interpretation: Negative (01-16-21 @ 13:31)  COVID-19 PCR: Detected (01-16-21 @ 00:38)  COVID-19 PCR: NotDetec (01-06-21 @ 06:01)  COVID-19 IgG Antibody Index: 0.06 Index (01-05-21 @ 22:41)  COVID-19 IgG Antibody Interpretation: Negative (01-05-21 @ 22:41)  COVID-19 PCR: NotDetec (01-05-21 @ 12:42)    All imaging and data are reviewed.   < from: CT Abdomen and Pelvis No Cont (01.16.21 @ 03:03) >  IMPRESSION:  Marked interval improvement in multifocal pneumonia.  Emphysema.  Compared to the recent CT of January 5, 2021, interval development of colitis of the sigmoid colon and rectum, of infectious or inflammatory etiology.  Redemonstration of gallstones.      Assessment and Plan:   86yo female, with PMH afib (on Coumadin), CVA and h/o aneurysm repair, infrarenal AAA, HTN, HLD, mitral valve prolapse, PVD of RLE s/p stents x3, rheumatoid arthritis, s/p hysterectomy, and L cataract, BIBEMS from [place] for hypoxia, fever and lethargy. Pt was found to be satting 87% on RA and temperature of 101 F as per facility documentation. Pt tested positive for COVID on 1/12/21. The pt is s/p recent admission to the [facility] 1/5-1/8, was treated for PNA - received Zithromax x 3 days, completed a course of IV Zosyn, discharged home on Augmentin x 7 days. The had a low grade fever of 100.4 yesterday night, on supplemental O2 via NC, lethargic, WBC 11.27 on admission, decreased slightly now, + BRIDGET, with normal lactate, + elevated D-dimer 777, with normal liver enzymes, COVID 19 test is positive. CT head - no change from the study on the previous recent admission.   On admission the pt complained of diffuse abdominal pain, CT chest/abd/pelvis without contrast was performed - marked improvement in multifocal pneumonia, + emphysema, + colititis of sigmoid colon and rectum of inflammatory or infectious etiology, + gallstones that were seen on previous admission, HIDA scan was done then and negative. As per RN, the pt did not have any bowel movements overnight or in the morning.  On exam, the pt is lethargic, on supplemental O2 via NC, able to answer some questions appropriately but drifts into sleep in the middle of the sentence, pt's abdomen is diffusely tender upon palpation especially in the left lower quadrant. The pt was started on antimicrobial therapy on admission with Zosyn IV, remdesivir held secondary to BRIDGET, pt is on Decadron. +    Patient complains of heel pain, exam without local inflammatory signs, warm/well perfused appearing. Postop changes. Does not appear to have limb ischemia. Chest exam with diminished breath sounds, SaO2 NC 93%. . Abdominal exam with significant tenderness LLQ > suprapubic area >> elsewhere. No rebound. + guarding. Deforming arthritis c/w known RA.  Patient denies diarrhea prior to admission or here in the hospital, states passed flatus. At risk for resistant daniele given recent zosyn, antibiotics altered. Need to remain vigilant for C. difficile. Patient w RA, no DMARDS/biologics known to place patient at increased risk for opportunistic pathogens. Patient with prior hx of mesenteric ischemia but pain not out of proportion to physical findings, rectum unsual location to have ischemia given duplicate blood supply. COVID certainly potentially associated with hyperocoagulable states. Merits decadron but will suppress abdominal exam. Remdesivir not shown to impact mortality, in setting of BRIDGET potentially harmful, would avoid.     1/17: no fevers, WBC increased - pt is on steroids, Cr increased - off remdesivir, Decadron continued, pt did not have a bowel movement as per RN and nursing assistant, remain vigilant for C. Diff if the pt will start having bowel movements.   1/18: No fever, leukocytosis worse but on steroid. Blood cultures are negative. Creat 2.4 stable since yesterday but baseline is lower.   1/20: No fever, doing better, no abdominal pain in exam.     1. Sigmoid colitis - no bowel movements   2. COVID 19   3. Fever  4. Leukocytosis  5. BRIDGET    - continue cefepime - renally dosed  - continue Flagyl 500 mg IV q 8 horus  - Can treat for total 7-10 days.   - If develops diarrhea please send C diff and GI PCR  - No remdesivir - due to abnormal renal function   - continue Decadron x 10 days   - blood cultures NGTD  - monitor pt's temperatures  - monitor WBC  - monitor pt's renal function  - supplemental O2 for O2 SAT >92%  - precautions as per protocol  - anticoagulation as per protocol  - palliative care follow up    Will follow.    Sindi Anthony MD  Division of infectious Diseases  Cell [phone] between 8am and 6pm  After 6pm and over the weekends please call ID service line at [phone].

## 2021-01-20 NOTE — PROGRESS NOTE ADULT - SUBJECTIVE AND OBJECTIVE BOX
Patient is a 85y old  Female who presents with a chief complaint of AMS, lethargy (20 Jan 2021 15:09)  ----  INTERVAL HPI/OVERNIGHT EVENTS: Pt seen and evaluated at the bedside. No acute overnight events occurred. Reports feeling better than yesterday, denies any cp or sob, better appetite, no nausea.    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  Osteoarthritis  Afib  PVD (peripheral vascular disease)  Neuropathy(Right lower leg)  H/O cerebral aneurysm repair  brain clips  CVA (cerebral vascular accident)  Rheumatoid arthritis  Hyperlipidemia  Hypertension  S/P cataract surgery  (Left eye)  Elective surgery  (Twisted bowel)  Elective surgery  (Exision of cyst on liver, 1985)  S/P ORIF (open reduction internal fixation) fracture  Left hip fx (2012) &amp; R hip fx (2013)  H/O cerebral aneurysm repair  Brain clips (1978(  Renal stone  Cysto stent placement 10/1/2014  PVD (peripheral vascular disease)  s/p RLE bypass x 3, most recent 3/2012 Right external iliac to PT bypass w/ PTFE (2012)  S/P FRED (total abdominal hysterectomy)      FAMILY HISTORY:  No pertinent family history in first degree relatives    Allergies: No Known Allergies  Intolerances    ----  MEDICATIONS  (STANDING):  ascorbic acid 500 milliGRAM(s) Oral daily  aspirin enteric coated 81 milliGRAM(s) Oral daily  atorvastatin 40 milliGRAM(s) Oral at bedtime  budesonide  80 MICROgram(s)/formoterol 4.5 MICROgram(s) Inhaler 2 Puff(s) Inhalation two times a day  cefepime   IVPB 1000 milliGRAM(s) IV Intermittent every 24 hours  cefepime   IVPB      dexAMETHasone  Injectable 6 milliGRAM(s) IV Push daily  diltiazem    milliGRAM(s) Oral daily  gabapentin 100 milliGRAM(s) Oral three times a day  lactobacillus acidophilus 1 Tablet(s) Oral three times a day  metoprolol tartrate 12.5 milliGRAM(s) Oral every 6 hours  metroNIDAZOLE    Tablet 500 milliGRAM(s) Oral every 8 hours  multivitamin 1 Tablet(s) Oral daily  pantoprazole    Tablet 40 milliGRAM(s) Oral every 12 hours  tiotropium 18 MICROgram(s) Capsule 1 Capsule(s) Inhalation daily    MEDICATIONS  (PRN):  acetaminophen   Tablet .. 650 milliGRAM(s) Oral every 6 hours PRN Temp greater or equal to 38C (100.4F), Mild Pain (1 - 3)  ALBUTerol    90 MICROgram(s) HFA Inhaler 2 Puff(s) Inhalation every 3 hours PRN Shortness of Breath and/or Wheezing  guaiFENesin   Syrup  (Sugar-Free) 200 milliGRAM(s) Oral every 6 hours PRN Cough      ----  REVIEW OF SYSTEMS:  CONSTITUTIONAL: denies fever, chills, weakness  HEENT: denies blurred vision  SKIN: denies new lesions, rash  CARDIOVASCULAR: denies chest pain, chest pressure  RESPIRATORY: denies shortness of breath, sputum production  GASTROINTESTINAL: denies nausea, vomiting, diarrhea, abdominal pain  GENITOURINARY: denies dysuria  NEUROLOGICAL: denies numbness, headache, focal weakness  MUSCULOSKELETAL: reports diffuse joint pain, no muscle aches  HEMATOLOGIC: denies gross bleeding, bruising  LYMPHATICS: denies extremity swelling    ----  PHYSICAL EXAM:  GENERAL: patient appears well, no acute distress, appropriately interactive  EYES: sclera clear, no exudates  ENMT: oropharynx clear without erythema, moist mucous membranes  NECK: supple, soft, no thyromegaly noted  LUNGS:  clear to auscultation, LL base decrease breath sounds, no wheezing or rhonchi appreciated  HEART: S1/S2, rate control, no murmurs noted, no noted edema to b/l LE  GASTROINTESTINAL: abdomen is soft, nontender, nondistended, normoactive bowel sounds, no palpable masses  INTEGUMENT: no visualized rashes, no jaundice noted  MUSCULOSKELETAL: no obvious deformity.  NEUROLOGIC: awake, alert, oriented x3, grossly intact  PSYCHIATRIC: mood is good, affect is intact  HEME/LYMPH: no palpable supraclavicular nodules     T(C): 36.4 (01-20-21 @ 04:41), Max: 36.4 (01-20-21 @ 04:41)  HR: 63 (01-20-21 @ 05:18) (58 - 70)  BP: 152/73 (01-20-21 @ 04:41) (125/72 - 152/73)  RR: 17 (01-20-21 @ 04:41) (17 - 20)  SpO2: 92% (01-20-21 @ 05:18) (92% - 94%)  Wt(kg): --    ----  I&O's Summary      LABS:                        9.2    16.47 )-----------( 203      ( 20 Jan 2021 09:28 )             29.4     01-20    148<H>  |  120<H>  |  54<H>  ----------------------------<  116<H>  3.6   |  18<L>  |  1.50<H>    Ca    8.4<L>      20 Jan 2021 09:28    TPro  6.0  /  Alb  2.3<L>  /  TBili  0.3  /  DBili  x   /  AST  34  /  ALT  17  /  AlkPhos  104  01-19    PT/INR - ( 20 Jan 2021 09:28 )   PT: 19.6 sec;   INR: 1.72 ratio         PTT - ( 19 Jan 2021 10:21 )  PTT:43.2 sec    CAPILLARY BLOOD GLUCOSE

## 2021-01-20 NOTE — PROGRESS NOTE ADULT - NSHPATTENDINGPLANDISCUSS_GEN_ALL_CORE
pt, RN; d/w daughter Zaira by phone [phone] - all questions answered comprehensively
pt, RN; d/w daughter Zaira by phone [phone] - all questions answered comprehensively
Patient, RN, Daughter
Dixie Daughter

## 2021-01-20 NOTE — PROGRESS NOTE ADULT - SUBJECTIVE AND OBJECTIVE BOX
[facility] -- Dahiana Bañuelos, Evens, Toney, Truman Falcon Sav[place]  Office # [phone]      Follow Up:    afib AAA HTN HLD  Subjective/Observations:     No events overnight resting comfortably in bed.  No complaints of chest pain, dyspnea, or palpitations reported. No signs of orthopnea or PND.    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    Osteoarthritis    Afib    PVD (peripheral vascular disease)    Neuropathy  (Right lower leg)    H/O cerebral aneurysm repair  brain clips    CVA (cerebral vascular accident)  (&quot;Mini-stroke&quot;,1990&#x27;s)    Rheumatoid arthritis    Hyperlipidemia    Hypertension    S/P cataract surgery  (Left eye)    Elective surgery  (&quot;Twisted bowel&quot;, 2014)    Elective surgery  (Exision of cyst on liver, 1985)    S/P ORIF (open reduction internal fixation) fracture  Left hip fx (2012) &amp; R hip fx (2013)    H/O cerebral aneurysm repair  Brain clips (1978(    Renal stone  Cysto stent placement 10/1/2014    PVD (peripheral vascular disease)  s/p RLE bypass x 3, most recent 3/2012 Right external iliac to PT bypass w/ PTFE (2012)    S/P FRED (total abdominal hysterectomy)  (1987, Hx of &quot;ovarian cancer?&quot;)        MEDICATIONS  (STANDING):  ascorbic acid 500 milliGRAM(s) Oral daily  aspirin enteric coated 81 milliGRAM(s) Oral daily  atorvastatin 40 milliGRAM(s) Oral at bedtime  budesonide  80 MICROgram(s)/formoterol 4.5 MICROgram(s) Inhaler 2 Puff(s) Inhalation two times a day  cefepime   IVPB      cefepime   IVPB 1000 milliGRAM(s) IV Intermittent every 24 hours  dexAMETHasone  Injectable 6 milliGRAM(s) IV Push daily  diltiazem    milliGRAM(s) Oral daily  gabapentin 100 milliGRAM(s) Oral three times a day  lactobacillus acidophilus 1 Tablet(s) Oral three times a day  metoprolol tartrate 12.5 milliGRAM(s) Oral every 6 hours  metroNIDAZOLE  IVPB      metroNIDAZOLE  IVPB 500 milliGRAM(s) IV Intermittent every 8 hours  multivitamin 1 Tablet(s) Oral daily  pantoprazole    Tablet 40 milliGRAM(s) Oral every 12 hours  sodium chloride 0.45%. 1000 milliLiter(s) (50 mL/Hr) IV Continuous <Continuous>  tiotropium 18 MICROgram(s) Capsule 1 Capsule(s) Inhalation daily    MEDICATIONS  (PRN):  acetaminophen   Tablet .. 650 milliGRAM(s) Oral every 6 hours PRN Temp greater or equal to 38C (100.4F), Mild Pain (1 - 3)  ALBUTerol    90 MICROgram(s) HFA Inhaler 2 Puff(s) Inhalation every 3 hours PRN Shortness of Breath and/or Wheezing  guaiFENesin   Syrup  (Sugar-Free) 200 milliGRAM(s) Oral every 6 hours PRN Cough      Allergies    No Known Allergies    Intolerances        Vital Signs Last 24 Hrs  T(C): 36.4 (20 Jan 2021 04:41), Max: 36.8 (19 Jan 2021 12:02)  T(F): 97.5 (20 Jan 2021 04:41), Max: 98.3 (19 Jan 2021 12:02)  HR: 63 (20 Jan 2021 05:18) (58 - 70)  BP: 152/73 (20 Jan 2021 04:41) (98/60 - 152/73)  BP(mean): --  RR: 17 (20 Jan 2021 04:41) (17 - 20)  SpO2: 92% (20 Jan 2021 05:18) (92% - 98%)    I&O's Summary        PHYSICAL EXAM:  TELE: not on tele   Constitutional: NAD, awake and alert, well-developed  HEENT: Moist Mucous Membranes, Anicteric  Pulmonary: Non-labored, breath sounds are clear bilaterally, No wheezing, crackles or rhonchi  Cardiovascular: Regular, S1 and S2 nl, murmur  Gastrointestinal: Bowel Sounds present, softly distended   Lymph: No lymphadenopathy. No peripheral edema.  Skin: No visible rashes or ulcers.  Psych:  Mood & affect appropriate    LABS: All Labs Reviewed:                        9.2    16.47 )-----------( 203      ( 20 Jan 2021 09:28 )             29.4                         9.5    18.49 )-----------( 209      ( 19 Jan 2021 10:21 )             30.4                         10.0   23.13 )-----------( 230      ( 18 Jan 2021 06:24 )             31.7     19 Jan 2021 10:21    148    |  119    |  66     ----------------------------<  137    3.4     |  18     |  1.80   18 Jan 2021 06:24    144    |  115    |  72     ----------------------------<  119    3.9     |  19     |  2.40     Ca    8.1        19 Jan 2021 10:21  Ca    7.5        18 Jan 2021 06:24    TPro  6.0    /  Alb  2.3    /  TBili  0.3    /  DBili  x      /  AST  34     /  ALT  17     /  AlkPhos  104    19 Jan 2021 10:21  TPro  6.0    /  Alb  2.2    /  TBili  0.3    /  DBili  x      /  AST  36     /  ALT  17     /  AlkPhos  78     18 Jan 2021 06:24    PT/INR - ( 19 Jan 2021 10:21 )   PT: 74.8 sec;   INR: 7.01 ratio         PTT - ( 19 Jan 2021 10:21 )  PTT:43.2 sec         ECG:  < from: 12 Lead ECG (01.16.21 @ 01:08) >    Ventricular Rate 106 BPM    Atrial Rate 79 BPM    QRS Duration 104 ms    Q-T Interval 374 ms    QTC Calculation(Bazett) 496 ms    R Axis -47 degrees    T Axis 57 degrees    Diagnosis Line Atrial fibrillation with rapid ventricular response  Left axis deviation  Septal infarct , age undetermined  Abnormal ECG  When compared with ECG of 05-JAN-2021 12:06,  Septal infarct is now present        Echo:  < from: TTE Echo Complete w/o Contrast w/ Doppler (01.06.21 @ 16:57) >    OBSERVATIONS:  Technically difficult study  Mitral Valve: Thickened leaflets, moderate MR.  Aortic Valve/Aorta: Sclerotic trileaflet aortic valve with normal opening. Mild AI  Tricuspid Valve: normal with trace TR.  Pulmonic Valve: Mild PI  Left Atrium: Enlarged  Right Atrium: Not well-visualized  Left Ventricle: Low-normal left ventricular systolic function, estimated LVEF of 50%.  Right Ventricle: Grossly normal size and systolic function.  Pericardium/Pleura: normal, no significant pericardial effusion.  Pulmonary/RV Pressure: estimated PA systolic pressure of 29 mmHg      Conclusion:  Technically difficult study  Low-normal left ventricular systolic function, estimated LVEF of 50%.  Grossly normal RV size and systolic function.  Left atrial enlargement  Sclerotic trileaflet aortic valve, mild AI.  Moderate MR  Mild TR.      < end of copied text >      Radiology:

## 2021-01-20 NOTE — CHART NOTE - NSCHARTNOTEFT_GEN_A_CORE
Called by RN for desaturation. COVID + pt on 3L NC desaturated to < 88% after coughing and O2 levels remained suboptimal >5 minutes. Pt cough was noted to be productive of blood tinged sputum. Of note, pt was treated for supratherapeutic INR of 7.1 with vitamin K on 01/19. INR this am 1.7. Pt A&O x 2 and unable to verify if this was first occurrence of bloody cough. Pt O2 saturation returned to 90% prior to Venti mask. Denies fever, chills, headaches, dizziness, blurred vision, difficulty swallowing, chest pain, palpitations, numbness/tingling, abdominal pain, diarrhea.      T(C): 36.8 (01-20-21 @ 20:52), Max: 36.8 (01-20-21 @ 20:52)  HR: 67 (01-20-21 @ 22:56) (58 - 70)  BP: 139/65 (01-20-21 @ 22:56) (135/71 - 165/80)  RR: 17 (01-20-21 @ 20:52) (17 - 17)  SpO2: 90% (01-20-21 @ 20:52) (90% - 94%)  Wt(kg): --    Physical Exam:  GENERAL: Elderly female, no acute distress  EYES: sclera clear, no exudates  ENMT: blood tinged sputum in oropharynx, moist mucous membranes  NECK: supple, soft, no thyromegaly noted  LUNGS:  BL L base decrease breath sounds, no wheezing or rhonchi appreciated  HEART: S1/S2, rate control, no edema to b/l LE  GASTROINTESTINAL: abdomen is soft, nontender, nondistended, normoactive bowel sounds, no palpable masses  INTEGUMENT: no visualized rashes, no jaundice noted  MUSCULOSKELETAL: no obvious deformity.  NEUROLOGIC: awake, alert, oriented x2, grossly intact    LABS:                        9.2    16.47 )-----------( 203      ( 20 Jan 2021 09:28 )             29.4     01-20    148<H>  |  120<H>  |  54<H>  ----------------------------<  116<H>  3.6   |  18<L>  |  1.50<H>    Ca    8.4<L>      20 Jan 2021 09:28    TPro  6.0  /  Alb  2.3<L>  /  TBili  0.3  /  DBili  x   /  AST  34  /  ALT  17  /  AlkPhos  104  01-19    PT/INR - ( 20 Jan 2021 09:28 )   PT: 19.6 sec;   INR: 1.72 ratio         PTT - ( 19 Jan 2021 10:21 )  PTT:43.2 sec            Assessment/Plan  85F, SNF, HTN, HL, Afib/coumadin, hx CVA/aneurysm repair, infrarenal AAA, PVD/RLE stents, RA; admitted for acute hypoxic resp failure, COVID19 PNA, colitis, sepsis and Afib RVR    O2 saturation improved 85%--> 90%.  Maintain O2 sats on 3LNC for now.  INR elev 7.01 on 01/19 -->1.7 on 01/20 after single dose of Vit K  Blood tinged sputum likely w/ clots 2/2 airway abrasion in setting of supratherapeutic INR  Continue trending INR  Continue to monitor vitals  Titrate Supplemental O2 as needed  RN to call with any changes Called by RN for desaturation. COVID + pt on 3L NC desaturated to < 88% after coughing and O2 levels remained suboptimal >5 minutes. Pt cough was noted to be productive of blood tinged sputum. Of note, pt was treated for supratherapeutic INR of 7.1 with vitamin K on 01/19. INR this am 1.7. Pt A&O x 2 and unable to verify if this was first occurrence of bloody cough. Pt O2 saturation returned to 90% prior to Venti mask. Denies fever, chills, headaches, dizziness, blurred vision, difficulty swallowing, chest pain, palpitations, numbness/tingling, abdominal pain, diarrhea.      T(C): 36.8 (01-20-21 @ 20:52), Max: 36.8 (01-20-21 @ 20:52)  HR: 67 (01-20-21 @ 22:56) (58 - 70)  BP: 139/65 (01-20-21 @ 22:56) (135/71 - 165/80)  RR: 17 (01-20-21 @ 20:52) (17 - 17)  SpO2: 90% (01-20-21 @ 20:52) (90% - 94%)  Wt(kg): --    Physical Exam:  GENERAL: Elderly female, no acute distress  EYES: sclera clear, no exudates  ENMT: blood tinged sputum in oropharynx, moist mucous membranes  NECK: supple, soft, no thyromegaly noted  LUNGS:  BL L base decrease breath sounds, no wheezing or rhonchi appreciated  HEART: S1/S2, rate control, no edema to b/l LE  GASTROINTESTINAL: abdomen is soft, nontender, nondistended, normoactive bowel sounds, no palpable masses  INTEGUMENT: no visualized rashes, no jaundice noted  MUSCULOSKELETAL: no obvious deformity.  NEUROLOGIC: awake, alert, oriented x2, grossly intact    LABS:                        9.2    16.47 )-----------( 203      ( 20 Jan 2021 09:28 )             29.4     01-20    148<H>  |  120<H>  |  54<H>  ----------------------------<  116<H>  3.6   |  18<L>  |  1.50<H>    Ca    8.4<L>      20 Jan 2021 09:28    TPro  6.0  /  Alb  2.3<L>  /  TBili  0.3  /  DBili  x   /  AST  34  /  ALT  17  /  AlkPhos  104  01-19    PT/INR - ( 20 Jan 2021 09:28 )   PT: 19.6 sec;   INR: 1.72 ratio         PTT - ( 19 Jan 2021 10:21 )  PTT:43.2 sec            Assessment/Plan  85F, SNF, HTN, HL, Afib/coumadin, hx CVA/aneurysm repair, infrarenal AAA, PVD/RLE stents, RA; admitted for acute hypoxic resp failure, COVID19 PNA, colitis, sepsis and Afib RVR    O2 saturation improved 85%--> 90%.  Maintain O2 sats on 3LNC for now.  INR elev 7.01 on 01/19 -->1.7 on 01/20 after single dose of Vit K  Blood tinged sputum likely clots 2/2 mucosal trauma from cough in setting of prior supratherapeutic INR  Continue trending INR  Continue to monitor vitals  Titrate Supplemental O2 as needed  RN to call with any changes

## 2021-01-21 NOTE — PROGRESS NOTE ADULT - SUBJECTIVE AND OBJECTIVE BOX
[facility] -- Dahiana Bañuelos, Evens, Toney, Truman Falcon, Susi De Los Santos: Office # [phone]    Follow Up:  A fib, AAA, HTN     Subjective/Observations: Patient seen and examined. Patient awake, alert, resting in bed. No complaints of chest pain, dyspnea, palpitations or dizziness. No signs of orthopnea or PND. Tolerating O2 via nasal cannula.     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    Osteoarthritis    Afib    PVD (peripheral vascular disease)    Neuropathy  (Right lower leg)    H/O cerebral aneurysm repair  brain clips    CVA (cerebral vascular accident)  (&quot;Mini-stroke&quot;,1990&#x27;s)    Rheumatoid arthritis    Hyperlipidemia    Hypertension    S/P cataract surgery  (Left eye)    Elective surgery  (&quot;Twisted bowel&quot;, 2014)    Elective surgery  (Exision of cyst on liver, 1985)    S/P ORIF (open reduction internal fixation) fracture  Left hip fx (2012) &amp; R hip fx (2013)    H/O cerebral aneurysm repair  Brain clips (1978(    Renal stone  Cysto stent placement 10/1/2014    PVD (peripheral vascular disease)  s/p RLE bypass x 3, most recent 3/2012 Right external iliac to PT bypass w/ PTFE (2012)    S/P FRED (total abdominal hysterectomy)  (1987, Hx of &quot;ovarian cancer?&quot;)    MEDICATIONS  (STANDING):  ascorbic acid 500 milliGRAM(s) Oral daily  aspirin enteric coated 81 milliGRAM(s) Oral daily  atorvastatin 40 milliGRAM(s) Oral at bedtime  budesonide  80 MICROgram(s)/formoterol 4.5 MICROgram(s) Inhaler 2 Puff(s) Inhalation two times a day  cefepime   IVPB      cefepime   IVPB 1000 milliGRAM(s) IV Intermittent every 24 hours  dexAMETHasone  Injectable 6 milliGRAM(s) IV Push daily  diltiazem    milliGRAM(s) Oral daily  gabapentin 100 milliGRAM(s) Oral three times a day  lactobacillus acidophilus 1 Tablet(s) Oral three times a day  metoprolol tartrate 12.5 milliGRAM(s) Oral every 6 hours  metroNIDAZOLE    Tablet 500 milliGRAM(s) Oral every 8 hours  multivitamin 1 Tablet(s) Oral daily  pantoprazole    Tablet 40 milliGRAM(s) Oral every 12 hours  sodium chloride 0.65% Nasal 1 Spray(s) Both Nostrils three times a day  tiotropium 18 MICROgram(s) Capsule 1 Capsule(s) Inhalation daily    MEDICATIONS  (PRN):  acetaminophen   Tablet .. 650 milliGRAM(s) Oral every 6 hours PRN Temp greater or equal to 38C (100.4F), Mild Pain (1 - 3)  ALBUTerol    90 MICROgram(s) HFA Inhaler 2 Puff(s) Inhalation every 3 hours PRN Shortness of Breath and/or Wheezing  guaiFENesin   Syrup  (Sugar-Free) 200 milliGRAM(s) Oral every 6 hours PRN Cough    Allergies  No Known Allergies    Vital Signs Last 24 Hrs  T(C): 36.8 (21 Jan 2021 11:40), Max: 36.8 (20 Jan 2021 20:52)  T(F): 98.3 (21 Jan 2021 11:40), Max: 98.3 (20 Jan 2021 20:52)  HR: 70 (21 Jan 2021 11:40) (66 - 79)  BP: 148/70 (21 Jan 2021 11:40) (139/65 - 165/80)  BP(mean): --  RR: 15 (21 Jan 2021 11:40) (15 - 17)  SpO2: 93% (21 Jan 2021 11:40) (90% - 93%)  I&O's Summary    20 Jan 2021 07:01  -  21 Jan 2021 07:00  --------------------------------------------------------  IN: 0 mL / OUT: 750 mL / NET: -750 mL    TELE: Not on telemetry   PHYSICAL EXAM:  Appearance: NAD, no distress, alert, Well developed   HEENT: Moist Mucous Membranes, Anicteric  Cardiovascular: Regular rate and rhythm, Normal S1 S2, No JVD, + murmurs, No rubs, gallops or clicks  Respiratory: Non-labored, Clear to auscultation, Diminished No rales, No rhonchi, No wheezing.   Gastrointestinal:  Soft, Non-tender, + BS  Neurologic: Non-focal  Skin: Warm and dry, No visible rashes or ulcers, No ecchymosis, No cyanosis  Musculoskeletal: No clubbing, No cyanosis, No joint swelling/tenderness  Psychiatry: Mood & affect appropriate  Lymph: No peripheral edema.     LABS: All Labs Reviewed:                        8.8    15.33 )-----------( 223      ( 21 Jan 2021 06:43 )             28.0                         9.2    16.47 )-----------( 203      ( 20 Jan 2021 09:28 )             29.4                         9.5    18.49 )-----------( 209      ( 19 Jan 2021 10:21 )             30.4     21 Jan 2021 06:43    149    |  122    |  51     ----------------------------<  117    3.5     |  19     |  1.40   20 Jan 2021 09:28    148    |  120    |  54     ----------------------------<  116    3.6     |  18     |  1.50   19 Jan 2021 10:21    148    |  119    |  66     ----------------------------<  137    3.4     |  18     |  1.80     Ca    8.1        21 Jan 2021 06:43  Ca    8.4        20 Jan 2021 09:28  Ca    8.1        19 Jan 2021 10:21    TPro  5.8    /  Alb  2.3    /  TBili  0.6    /  DBili  x      /  AST  32     /  ALT  23     /  AlkPhos  79     21 Jan 2021 06:43  TPro  6.0    /  Alb  2.3    /  TBili  0.3    /  DBili  x      /  AST  34     /  ALT  17     /  AlkPhos  104    19 Jan 2021 10:21    PT/INR - ( 20 Jan 2021 09:28 )   PT: 19.6 sec;   INR: 1.72 ratio    D-Dimer Assay, Quantitative: 777 ng/mL DDU (01-16-21 @ 00:03)    12 Lead ECG:   Ventricular Rate 106 BPM  Atrial Rate 79 BPM  QRS Duration 104 ms  Q-T Interval 374 ms  QTC Calculation(Bazett) 496 ms  R Axis -47 degrees  T Axis 57 degrees  Diagnosis Line Atrial fibrillation with rapid ventricular response  Left axis deviation  Septal infarct , age undetermined  Abnormal ECG  When compared with ECG of 05-JAN-2021 12:06,  Septal infarct is now present  Confirmed by Tru Ziegler MD ([id number]) on 1/16/2021 4:36:07 PM (01-16-21 @ 01:08)    < from: TTE Echo Complete w/o Contrast w/ Doppler (01.06.21 @ 16:57) >  Dimensions:  LA 4.4       Normal Values: 2.0 - 4.0 cm  Ao 2.6        Normal Values: 2.0 - 3.8 cm  SEPTUM 0.7       Normal Values: 0.6 - 1.2 cm  PWT 0.8       Normal Values: 0.6 - 1.1 cm  LVIDd 4.8         Normal Values: 3.0 - 5.6 cm  LVIDs 3.3         Normal Values: 1.8 - 4.0 cm    OBSERVATIONS:  Technically difficult study  Mitral Valve: Thickened leaflets, moderate MR.  Aortic Valve/Aorta: Sclerotic trileaflet aortic valve with normal opening. Mild AI  Tricuspid Valve: normal with trace TR.  Pulmonic Valve: Mild PI  Left Atrium: Enlarged  Right Atrium: Not well-visualized  Left Ventricle: Low-normal left ventricular systolic function, estimated LVEF of 50%.  Right Ventricle: Grossly normal size and systolic function.  Pericardium/Pleura: normal, no significant pericardial effusion.  Pulmonary/RV Pressure: estimated PA systolic pressure of 29 mmHg    Conclusion:  Technically difficult study  Low-normal left ventricular systolic function, estimated LVEF of 50%.  Grossly normal RV size and systolic function.  Left atrial enlargement  Sclerotic trileaflet aortic valve, mild AI.  Moderate MR  Mild TR.    < end of copied text >

## 2021-01-21 NOTE — PROGRESS NOTE ADULT - SUBJECTIVE AND OBJECTIVE BOX
[facility]  INFECTIOUS DISEASES   [address] [place] [address]  Tel: [phone]     Fax: [phone]  =======================================================    CARITO CONSTANTINO [id number]    Follow up: COVID and colitis     On O2 with NC, comfortable.  No fever. Cultures negative.   Responding but confused.     Allergies:  No Known Allergies    Antibiotics:    cefepime   IVPB 1000 milliGRAM(s) IV Intermittent every 24 hours    metroNIDAZOLE  IVPB 500 milliGRAM(s) IV Intermittent every 8 hours    REVIEW OF SYSTEMS:  CONSTITUTIONAL:  No Fever or chills  HEENT:   No diplopia or blurred vision.  No earache, sore throat or runny nose.  CARDIOVASCULAR:  No chest pain or SOB  RESPIRATORY:  No cough, shortness of breath, PND or orthopnea.  GASTROINTESTINAL:  No nausea, vomiting or diarrhea.  GENITOURINARY:  No dysuria, frequency or urgency. No Blood in urine  MUSCULOSKELETAL:  no joint aches, no muscle pain  SKIN:  No change in skin, hair or nails.  NEUROLOGIC:  No paresthesias or weakness.  PSYCHIATRIC:  No disorder of thought or mood.  ENDOCRINE:  No heat or cold intolerance, polyuria or polydipsia.  HEMATOLOGICAL:  No easy bruising or bleeding.      Physical Exam:  Vital Signs Last 24 Hrs  T(C): 36.8 (21 Jan 2021 11:40), Max: 36.8 (20 Jan 2021 20:52)  T(F): 98.3 (21 Jan 2021 11:40), Max: 98.3 (20 Jan 2021 20:52)  HR: 70 (21 Jan 2021 16:41) (66 - 79)  BP: 155/82 (21 Jan 2021 16:41) (139/65 - 165/80)  BP(mean): --  RR: 15 (21 Jan 2021 11:40) (15 - 17)  SpO2: 93% (21 Jan 2021 11:40) (90% - 93%)  GEN: NAD  HEENT: normocephalic and atraumatic. EOMI. GINI.    NECK: Supple.  No lymphadenopathy   LUNGS: Scattered crackles bilaterally   HEART: Regular rate and rhythm   ABDOMEN: Soft, nontender, and nondistended.  Positive bowel sounds.    : No CVA tenderness  EXTREMITIES: Without edema.  MSK: no joint swelling  NEUROLOGIC: grossly intact.  PSYCHIATRIC: Appropriate affect .  SKIN: No ulceration or induration present.        Labs:                        8.8    15.33 )-----------( 223      ( 21 Jan 2021 06:43 )             28.0      01-21    149<H>  |  122<H>  |  51<H>  ----------------------------<  117<H>  3.5   |  19<L>  |  1.40<H>    Ca    8.1<L>      21 Jan 2021 06:43    TPro  5.8<L>  /  Alb  2.3<L>  /  TBili  0.6  /  DBili  x   /  AST  32  /  ALT  23  /  AlkPhos  79  01-21    Culture - Blood (collected 01-16-21 @ 05:01)  Source: .Blood Blood-Peripheral  Final Report (01-21-21 @ 06:00):    No Growth Final    Culture - Blood (collected 01-16-21 @ 05:01)  Source: .Blood Blood-Peripheral  Final Report (01-21-21 @ 06:00):    No Growth Final    WBC Count: 15.33 K/uL (01-21-21 @ 06:43)  WBC Count: 16.47 K/uL (01-20-21 @ 09:28)  WBC Count: 18.49 K/uL (01-19-21 @ 10:21)  WBC Count: 23.13 K/uL (01-18-21 @ 06:24)  WBC Count: 18.13 K/uL (01-17-21 @ 05:43)    Creatinine, Serum: 1.40 mg/dL (01-21-21 @ 06:43)  Creatinine, Serum: 1.50 mg/dL (01-20-21 @ 09:28)  Creatinine, Serum: 1.80 mg/dL (01-19-21 @ 10:21)  Creatinine, Serum: 2.40 mg/dL (01-18-21 @ 06:24)  Creatinine, Serum: 2.50 mg/dL (01-17-21 @ 05:43)    C-Reactive Protein, Serum: 1.63 mg/dL (01-19-21 @ 16:47)  C-Reactive Protein, Serum: 6.22 mg/dL (01-16-21 @ 14:08)    Ferritin, Serum: 656 ng/mL (01-19-21 @ 16:34)  Ferritin, Serum: 414 ng/mL (01-16-21 @ 14:11)    Procalcitonin, Serum: 1.02 ng/mL (01-16-21 @ 09:34)     COVID-19 IgG Antibody Index: <0.10 Index (01-16-21 @ 13:31)  COVID-19 IgG Antibody Interpretation: Negative (01-16-21 @ 13:31)  COVID-19 PCR: Detected (01-16-21 @ 00:38)  COVID-19 PCR: NotDetec (01-06-21 @ 06:01)  COVID-19 IgG Antibody Index: 0.06 Index (01-05-21 @ 22:41)  COVID-19 IgG Antibody Interpretation: Negative (01-05-21 @ 22:41)  COVID-19 PCR: NotDetec (01-05-21 @ 12:42)    All imaging and data are reviewed.   < from: CT Abdomen and Pelvis No Cont (01.16.21 @ 03:03) >  IMPRESSION:  Marked interval improvement in multifocal pneumonia.  Emphysema.  Compared to the recent CT of January 5, 2021, interval development of colitis of the sigmoid colon and rectum, of infectious or inflammatory etiology.  Redemonstration of gallstones.      Assessment and Plan:   84yo female, with PMH afib (on Coumadin), CVA and h/o aneurysm repair, infrarenal AAA, HTN, HLD, mitral valve prolapse, PVD of RLE s/p stents x3, rheumatoid arthritis, s/p hysterectomy, and L cataract, BIBEMS from [facility] for hypoxia, fever and lethargy. Pt was found to be satting 87% on RA and temperature of 101 F as per facility documentation. Pt tested positive for COVID on 1/12/21. The pt is s/p recent admission to the [facility] 1/5-1/8, was treated for PNA - received Zithromax x 3 days, completed a course of IV Zosyn, discharged home on Augmentin x 7 days. The had a low grade fever of 100.4 yesterday night, on supplemental O2 via NC, lethargic, WBC 11.27 on admission, decreased slightly now, + BRIDGET, with normal lactate, + elevated D-dimer 777, with normal liver enzymes, COVID 19 test is positive. CT head - no change from the study on the previous recent admission.   On admission the pt complained of diffuse abdominal pain, CT chest/abd/pelvis without contrast was performed - marked improvement in multifocal pneumonia, + emphysema, + colititis of sigmoid colon and rectum of inflammatory or infectious etiology, + gallstones that were seen on previous admission, HIDA scan was done then and negative. As per RN, the pt did not have any bowel movements overnight or in the morning.  On exam, the pt is lethargic, on supplemental O2 via NC, able to answer some questions appropriately but drifts into sleep in the middle of the sentence, pt's abdomen is diffusely tender upon palpation especially in the left lower quadrant. The pt was started on antimicrobial therapy on admission with Zosyn IV, remdesivir held secondary to BRIDGET, pt is on Decadron. +    Patient complains of heel pain, exam without local inflammatory signs, warm/well perfused appearing. Postop changes. Does not appear to have limb ischemia. Chest exam with diminished breath sounds, SaO2 NC 93%. . Abdominal exam with significant tenderness LLQ > suprapubic area >> elsewhere. No rebound. + guarding. Deforming arthritis c/w known RA.  Patient denies diarrhea prior to admission or here in the hospital, states passed flatus. At risk for resistant daniele given recent zosyn, antibiotics altered. Need to remain vigilant for C. difficile. Patient w RA, no DMARDS/biologics known to place patient at increased risk for opportunistic pathogens. Patient with prior hx of mesenteric ischemia but pain not out of proportion to physical findings, rectum unsual location to have ischemia given duplicate blood supply. COVID certainly potentially associated with hyperocoagulable states. Merits decadron but will suppress abdominal exam. Remdesivir not shown to impact mortality, in setting of BRIDGET potentially harmful, would avoid.     1/17: no fevers, WBC increased - pt is on steroids, Cr increased - off remdesivir, Decadron continued, pt did not have a bowel movement as per RN and nursing assistant, remain vigilant for C. Diff if the pt will start having bowel movements.   1/18: No fever, leukocytosis worse but on steroid. Blood cultures are negative. Creat 2.4 stable since yesterday but baseline is lower.   1/20: No fever, doing better, no abdominal pain in exam.     1. Sigmoid colitis - no bowel movements   2. COVID 19   3. Fever  4. Leukocytosis  5. BRIDGET    - continue cefepime - renally dosed  - continue Flagyl 500 mg IV q 8 horus  - Can treat for total 7-10 days.   - If develops diarrhea please send C diff and GI PCR  - Remdesivir was held due to CrCl<30  - continue Decadron x 10 days   - blood cultures NGTD  - monitor pt's temperatures  - monitor WBC  - monitor pt's renal function  - supplemental O2 for O2 SAT >92%  - precautions as per protocol  - anticoagulation as per protocol  - palliative care follow up  - When ready for discharge can switch cefepime to oral antibiotic     Will follow.    Sindi Anthony MD  Division of infectious Diseases  Cell [phone] between 8am and 6pm  After 6pm and over the weekends please call ID service line at [phone].

## 2021-01-21 NOTE — PROGRESS NOTE ADULT - ASSESSMENT
BRIDGET: Prerenal azotemia, ATN  COVID +, Pneumonia  Colitis  Hypertension now with hypotension  Coagulopathy    Improving renal indices. Off IVF. If sodium trending up will add D5W. Increase PO fluids as tolerated. Avoid nephrotoxic meds as possible.   Monitor BP trend. Will follow electrolytes and renal function trend.

## 2021-01-21 NOTE — PROGRESS NOTE ADULT - SUBJECTIVE AND OBJECTIVE BOX
Patient is a 85y old  Female who presents with a chief complaint of AMS, lethargy (21 Jan 2021 16:48)    ----  INTERVAL HPI/OVERNIGHT EVENTS: Pt seen and evaluated at the bedside. No acute overnight events occurred. discussed with nurse.    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  Osteoarthritis  Afib  PVD (peripheral vascular disease)  Neuropathy  (Right lower leg)  H/O cerebral aneurysm repair  brain clips  CVA (cerebral vascular accident)  Rheumatoid arthritis  Hyperlipidemia  Hypertension    Surgical hx  S/P cataract surgery  (Left eye)    Elective surgery  (&quot;Twisted bowel&quot;, 2014)    Elective surgery  (Exision of cyst on liver, 1985)    S/P ORIF (open reduction internal fixation) fracture  Left hip fx (2012) &amp; R hip fx (2013)    H/O cerebral aneurysm repair  Brain clips (1978(    Renal stone  Cysto stent placement 10/1/2014    PVD (peripheral vascular disease)  s/p RLE bypass x 3, most recent 3/2012 Right external iliac to PT bypass w/ PTFE (2012)    S/P FRED (total abdominal hysterectomy)  (1987, Hx of ovarian cancer?)      FAMILY HISTORY:  No pertinent family history in first degree relatives     Allergies; No Known Allergies    Intolerances        ----  MEDICATIONS  (STANDING):  ascorbic acid 500 milliGRAM(s) Oral daily  aspirin enteric coated 81 milliGRAM(s) Oral daily  atorvastatin 40 milliGRAM(s) Oral at bedtime  budesonide  80 MICROgram(s)/formoterol 4.5 MICROgram(s) Inhaler 2 Puff(s) Inhalation two times a day  cefepime   IVPB      cefepime   IVPB 1000 milliGRAM(s) IV Intermittent every 24 hours  dexAMETHasone  Injectable 6 milliGRAM(s) IV Push daily  diltiazem    milliGRAM(s) Oral daily  gabapentin 100 milliGRAM(s) Oral three times a day  lactobacillus acidophilus 1 Tablet(s) Oral three times a day  metoprolol tartrate 12.5 milliGRAM(s) Oral every 6 hours  metroNIDAZOLE    Tablet 500 milliGRAM(s) Oral every 8 hours  multivitamin 1 Tablet(s) Oral daily  pantoprazole    Tablet 40 milliGRAM(s) Oral every 12 hours  sodium chloride 0.65% Nasal 1 Spray(s) Both Nostrils three times a day  tiotropium 18 MICROgram(s) Capsule 1 Capsule(s) Inhalation daily    MEDICATIONS  (PRN):  acetaminophen   Tablet .. 650 milliGRAM(s) Oral every 6 hours PRN Temp greater or equal to 38C (100.4F), Mild Pain (1 - 3)  ALBUTerol    90 MICROgram(s) HFA Inhaler 2 Puff(s) Inhalation every 3 hours PRN Shortness of Breath and/or Wheezing  guaiFENesin   Syrup  (Sugar-Free) 200 milliGRAM(s) Oral every 6 hours PRN Cough      ----  REVIEW OF SYSTEMS:  CONSTITUTIONAL: denies fever, chills, fatigue, weakness  HEENT: denies blurred vision, sore throat  SKIN: denies new lesions, rash  CARDIOVASCULAR: denies chest pain, chest pressure, palpitations  RESPIRATORY: denies shortness of breath, sputum production  GASTROINTESTINAL: denies nausea, vomiting, diarrhea, abdominal pain  GENITOURINARY: denies dysuria, discharge  NEUROLOGICAL: denies numbness, headache, focal weakness  MUSCULOSKELETAL: denies new joint pain, muscle aches  HEMATOLOGIC: denies gross bleeding, bruising  LYMPHATICS: denies enlarged lymph nodes, extremity swelling  PSYCHIATRIC: denies recent changes in anxiety, depression  ENDOCRINOLOGIC: denies sweating, cold or heat intolerance    ----  PHYSICAL EXAM:  GENERAL: patient appears well, no acute distress  EYES: sclera clear, no exudates  ENT: moist mucous membranes  NECK: supple, soft, no thyromegaly noted  LUNGS:  clear to auscultation, symmetric breath sounds, no wheezing or rhonchi appreciated  HEART: soft S1/S2, regular rate and rhythm, no murmurs noted, no noted edema to b/l LE  GASTROINTESTINAL: abdomen is soft, nontender, nondistended, normoactive bowel sounds, no palpable masses  INTEGUMENT: poor skin turgor, no visualized rashes, no jaundice noted  MUSCULOSKELETAL: no clubbing or cyanosis, no obvious deformity  NEUROLOGIC: awake, alert, oriented x3, no focal signs    T(C): 36.8 (01-21-21 @ 11:40), Max: 36.8 (01-20-21 @ 20:52)  HR: 70 (01-21-21 @ 16:41) (66 - 79)  BP: 155/82 (01-21-21 @ 16:41) (139/65 - 165/80)  RR: 15 (01-21-21 @ 11:40) (15 - 17)  SpO2: 93% (01-21-21 @ 11:40) (90% - 93%)  Wt(kg): --    ----  I&O's Summary    20 Jan 2021 07:01  -  21 Jan 2021 07:00  --------------------------------------------------------  IN: 0 mL / OUT: 750 mL / NET: -750 mL        LABS:                        8.8    15.33 )-----------( 223      ( 21 Jan 2021 06:43 )             28.0     01-21    149<H>  |  122<H>  |  51<H>  ----------------------------<  117<H>  3.5   |  19<L>  |  1.40<H>    Ca    8.1<L>      21 Jan 2021 06:43    TPro  5.8<L>  /  Alb  2.3<L>  /  TBili  0.6  /  DBili  x   /  AST  32  /  ALT  23  /  AlkPhos  79  01-21    PT/INR - ( 20 Jan 2021 09:28 )   PT: 19.6 sec;   INR: 1.72 ratio             CAPILLARY BLOOD GLUCOSE

## 2021-01-21 NOTE — PROGRESS NOTE ADULT - SUBJECTIVE AND OBJECTIVE BOX
Date/Time Patient Seen:  		  Referring MD:   Data Reviewed	       Patient is a 85y old  Female who presents with a chief complaint of AMS, lethargy (20 Jan 2021 16:51)      Subjective/HPI     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    Osteoarthritis    Afib    PVD (peripheral vascular disease)    Neuropathy  (Right lower leg)    Gout    H/O cerebral aneurysm repair  brain clips    CVA (cerebral vascular accident)  (&quot;Mini-stroke&quot;,1990&#x27;s)    Rheumatoid arthritis    Asthma    Hyperlipidemia    Hypertension    S/P cataract surgery  (Left eye)    Elective surgery  (&quot;Twisted bowel&quot;, 2014)    Elective surgery  (Exision of cyst on liver, 1985)    S/P ORIF (open reduction internal fixation) fracture  Left hip fx (2012) &amp; R hip fx (2013)    H/O cerebral aneurysm repair  Brain clips (1978(    Renal stone  Cysto stent placement 10/1/2014    PVD (peripheral vascular disease)  s/p RLE bypass x 3, most recent 3/2012 Right external iliac to PT bypass w/ PTFE (2012)    S/P FRED (total abdominal hysterectomy)  (1987, Hx of &quot;ovarian cancer?&quot;)          Medication list         MEDICATIONS  (STANDING):  ascorbic acid 500 milliGRAM(s) Oral daily  aspirin enteric coated 81 milliGRAM(s) Oral daily  atorvastatin 40 milliGRAM(s) Oral at bedtime  budesonide  80 MICROgram(s)/formoterol 4.5 MICROgram(s) Inhaler 2 Puff(s) Inhalation two times a day  cefepime   IVPB      cefepime   IVPB 1000 milliGRAM(s) IV Intermittent every 24 hours  dexAMETHasone  Injectable 6 milliGRAM(s) IV Push daily  diltiazem    milliGRAM(s) Oral daily  gabapentin 100 milliGRAM(s) Oral three times a day  lactobacillus acidophilus 1 Tablet(s) Oral three times a day  metoprolol tartrate 12.5 milliGRAM(s) Oral every 6 hours  metroNIDAZOLE    Tablet 500 milliGRAM(s) Oral every 8 hours  multivitamin 1 Tablet(s) Oral daily  pantoprazole    Tablet 40 milliGRAM(s) Oral every 12 hours  tiotropium 18 MICROgram(s) Capsule 1 Capsule(s) Inhalation daily    MEDICATIONS  (PRN):  acetaminophen   Tablet .. 650 milliGRAM(s) Oral every 6 hours PRN Temp greater or equal to 38C (100.4F), Mild Pain (1 - 3)  ALBUTerol    90 MICROgram(s) HFA Inhaler 2 Puff(s) Inhalation every 3 hours PRN Shortness of Breath and/or Wheezing  guaiFENesin   Syrup  (Sugar-Free) 200 milliGRAM(s) Oral every 6 hours PRN Cough         Vitals log        ICU Vital Signs Last 24 Hrs  T(C): 36.7 (21 Jan 2021 04:25), Max: 36.8 (20 Jan 2021 20:52)  T(F): 98.1 (21 Jan 2021 04:25), Max: 98.3 (20 Jan 2021 20:52)  HR: 79 (21 Jan 2021 04:25) (66 - 79)  BP: 154/67 (21 Jan 2021 04:25) (139/65 - 165/80)  BP(mean): --  ABP: --  ABP(mean): --  RR: 17 (21 Jan 2021 04:25) (17 - 17)  SpO2: 93% (21 Jan 2021 04:25) (90% - 93%)           Input and Output:  I&O's Detail    20 Jan 2021 07:01  -  21 Jan 2021 07:00  --------------------------------------------------------  IN:  Total IN: 0 mL    OUT:    Indwelling Catheter - Urethral (mL): 750 mL  Total OUT: 750 mL    Total NET: -750 mL          Lab Data                        8.8    15.33 )-----------( 223      ( 21 Jan 2021 06:43 )             28.0     01-20    148<H>  |  120<H>  |  54<H>  ----------------------------<  116<H>  3.6   |  18<L>  |  1.50<H>    Ca    8.4<L>      20 Jan 2021 09:28    TPro  6.0  /  Alb  2.3<L>  /  TBili  0.3  /  DBili  x   /  AST  34  /  ALT  17  /  AlkPhos  104  01-19            Review of Systems	      Objective     Physical Examination    heart s1s2  lung dec BS  abd soft      Pertinent Lab findings & Imaging      Fransico:  NO   Adequate UO     I&O's Detail    20 Jan 2021 07:01  -  21 Jan 2021 07:00  --------------------------------------------------------  IN:  Total IN: 0 mL    OUT:    Indwelling Catheter - Urethral (mL): 750 mL  Total OUT: 750 mL    Total NET: -750 mL               Discussed with:     Cultures:	        Radiology

## 2021-01-21 NOTE — PROGRESS NOTE ADULT - SUBJECTIVE AND OBJECTIVE BOX
Patient is a 85y old  Female who presents with a chief complaint of AMS, lethargy (17 Jan 2021 09:12)  Patient seen in follow up for BRIDGET.     Improving renal indices.        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    Osteoarthritis    Afib    PVD (peripheral vascular disease)    Neuropathy    Gout    H/O cerebral aneurysm repair    CVA (cerebral vascular accident)    Rheumatoid arthritis    Asthma    Hyperlipidemia    Hypertension    MEDICATIONS  (STANDING):  ascorbic acid 500 milliGRAM(s) Oral daily  aspirin enteric coated 81 milliGRAM(s) Oral daily  atorvastatin 40 milliGRAM(s) Oral at bedtime  budesonide  80 MICROgram(s)/formoterol 4.5 MICROgram(s) Inhaler 2 Puff(s) Inhalation two times a day  cefepime   IVPB      cefepime   IVPB 1000 milliGRAM(s) IV Intermittent every 24 hours  dexAMETHasone  Injectable 6 milliGRAM(s) IV Push daily  diltiazem    milliGRAM(s) Oral daily  gabapentin 100 milliGRAM(s) Oral three times a day  lactobacillus acidophilus 1 Tablet(s) Oral three times a day  metoprolol tartrate 12.5 milliGRAM(s) Oral every 6 hours  metroNIDAZOLE    Tablet 500 milliGRAM(s) Oral every 8 hours  multivitamin 1 Tablet(s) Oral daily  pantoprazole    Tablet 40 milliGRAM(s) Oral every 12 hours  sodium chloride 0.65% Nasal 1 Spray(s) Both Nostrils three times a day  tiotropium 18 MICROgram(s) Capsule 1 Capsule(s) Inhalation daily    MEDICATIONS  (PRN):  acetaminophen   Tablet .. 650 milliGRAM(s) Oral every 6 hours PRN Temp greater or equal to 38C (100.4F), Mild Pain (1 - 3)  ALBUTerol    90 MICROgram(s) HFA Inhaler 2 Puff(s) Inhalation every 3 hours PRN Shortness of Breath and/or Wheezing  guaiFENesin   Syrup  (Sugar-Free) 200 milliGRAM(s) Oral every 6 hours PRN Cough    T(C): 36.7 (01-21-21 @ 04:25), Max: 36.8 (01-19-21 @ 12:02)  HR: 79 (01-21-21 @ 04:25) (58 - 79)  BP: 154/67 (01-21-21 @ 04:25) (98/60 - 165/80)  RR: 17 (01-21-21 @ 04:25)  SpO2: 93% (01-21-21 @ 04:25)  Wt(kg): --  I&O's Detail    20 Jan 2021 07:01  -  21 Jan 2021 07:00  --------------------------------------------------------  IN:  Total IN: 0 mL    OUT:    Indwelling Catheter - Urethral (mL): 750 mL  Total OUT: 750 mL    Total NET: -750 mL                PHYSICAL EXAM:  General: No distress  Respiratory: b/l air entry  Cardiovascular: S1 S2  Gastrointestinal: soft, + tenderness  Extremities:  no edema                           LABORATORY:                        8.8    15.33 )-----------( 223      ( 21 Jan 2021 06:43 )             28.0     01-21    149<H>  |  122<H>  |  51<H>  ----------------------------<  117<H>  3.5   |  19<L>  |  1.40<H>    Ca    8.1<L>      21 Jan 2021 06:43    TPro  5.8<L>  /  Alb  2.3<L>  /  TBili  0.6  /  DBili  x   /  AST  32  /  ALT  23  /  AlkPhos  79  01-21    Sodium, Serum: 149 mmol/L (01-21 @ 06:43)  Sodium, Serum: 148 mmol/L (01-20 @ 09:28)    Potassium, Serum: 3.5 mmol/L (01-21 @ 06:43)  Potassium, Serum: 3.6 mmol/L (01-20 @ 09:28)    Hemoglobin: 8.8 g/dL (01-21 @ 06:43)  Hemoglobin: 9.2 g/dL (01-20 @ 09:28)  Hemoglobin: 9.5 g/dL (01-19 @ 10:21)    Creatinine, Serum 1.40 (01-21 @ 06:43)  Creatinine, Serum 1.50 (01-20 @ 09:28)  Creatinine, Serum 1.80 (01-19 @ 10:21)        LIVER FUNCTIONS - ( 21 Jan 2021 06:43 )  Alb: 2.3 g/dL / Pro: 5.8 g/dL / ALK PHOS: 79 U/L / ALT: 23 U/L / AST: 32 U/L / GGT: x

## 2021-01-21 NOTE — PROGRESS NOTE ADULT - ASSESSMENT
85f well known to our office and sees Dr. Laughlin.  She has afib (on Coumadin), GIB, CVA and h/o intracranial aneurysm repair, infrarenal AAA, HTN, HLD, mitral valve prolapse, PVD of RLE with failed bypass, failed redo bypass and failed multiple stents, with persistent occlusion.  She has rheumatoid arthritis, s/p hysterectomy, and L cataract.  She is known to have a borderline lvef of 50 by echo done 1 week ago when she was hosp with pna and an acute abd process.  At that time she was more rapid af given that she was not taking her meds, and her meds were adjusted to include metop 12.5 q6 and dilt 240.  Metop was changed to xl 100 at [facility] from [facility] for hypoxia, fever and lethargy. She has been treated for COVID pna/resp failure and colitis. She is being treated with decadron for COVID, but her BRIDGET precludes her from receiving remdesivir.  GI/ID following for colitis and recently switched to cefepime and flagyl.  She has had worsened issues with hypoxia and hypotension, and both icu and cardiology were consulted this am.      Afib with RVR  - In the setting of infection, now rate controlled   - No evidence of acute ischemia or volume overload  - Continue BB and CCB  - Continue Coumadin. INR normalized s/p Vit K   - TTE showed low-normal LVSF, EF 50%, normal RVF, with moderate MR.  No need to repeat unless change in cardiac status    Covid Pna  - Supplemental O2 as needed, now on NC   - Supportive care  - Continue current management  - Follow ID and Pulm recs    Hx of CVA  - Continue ASA and statin    BRIDGET  - Creatinine trend improving:  <--1.40,  <--1.50,  <--1.80,  <--2.40  - Continue to trend and avoid nephrotoxics  - Follow renal recs    - Monitor and replete lytes, keep K>4, Mg>2.  - All other medical needs as per primary team.  - Other cardiovascular workup will depend on clinical course.  - Will continue to follow.    Janna White, MS FNP, [facility]  Nurse Practitioner- Cardiology   Spectra #[phone]

## 2021-01-21 NOTE — PROGRESS NOTE ADULT - SUBJECTIVE AND OBJECTIVE BOX
INTERVAL HPI/OVERNIGHT EVENTS:  chart reviewed, on nc, interim events noted  no diarrhea, no gib per nursing    MEDICATIONS  (STANDING):  ascorbic acid 500 milliGRAM(s) Oral daily  aspirin enteric coated 81 milliGRAM(s) Oral daily  atorvastatin 40 milliGRAM(s) Oral at bedtime  budesonide  80 MICROgram(s)/formoterol 4.5 MICROgram(s) Inhaler 2 Puff(s) Inhalation two times a day  cefepime   IVPB      cefepime   IVPB 1000 milliGRAM(s) IV Intermittent every 24 hours  dexAMETHasone  Injectable 6 milliGRAM(s) IV Push daily  diltiazem    milliGRAM(s) Oral daily  gabapentin 100 milliGRAM(s) Oral three times a day  lactobacillus acidophilus 1 Tablet(s) Oral three times a day  metoprolol tartrate 12.5 milliGRAM(s) Oral every 6 hours  metroNIDAZOLE    Tablet 500 milliGRAM(s) Oral every 8 hours  multivitamin 1 Tablet(s) Oral daily  pantoprazole    Tablet 40 milliGRAM(s) Oral every 12 hours  sodium chloride 0.65% Nasal 1 Spray(s) Both Nostrils three times a day  tiotropium 18 MICROgram(s) Capsule 1 Capsule(s) Inhalation daily    MEDICATIONS  (PRN):  acetaminophen   Tablet .. 650 milliGRAM(s) Oral every 6 hours PRN Temp greater or equal to 38C (100.4F), Mild Pain (1 - 3)  ALBUTerol    90 MICROgram(s) HFA Inhaler 2 Puff(s) Inhalation every 3 hours PRN Shortness of Breath and/or Wheezing  guaiFENesin   Syrup  (Sugar-Free) 200 milliGRAM(s) Oral every 6 hours PRN Cough      Allergies    No Known Allergies    Intolerances        Review of Systems:  tele f/u    Vital Signs Last 24 Hrs  T(C): 36.7 (21 Jan 2021 04:25), Max: 36.8 (20 Jan 2021 20:52)  T(F): 98.1 (21 Jan 2021 04:25), Max: 98.3 (20 Jan 2021 20:52)  HR: 79 (21 Jan 2021 04:25) (66 - 79)  BP: 154/67 (21 Jan 2021 04:25) (139/65 - 165/80)  BP(mean): --  RR: 17 (21 Jan 2021 04:25) (17 - 17)  SpO2: 93% (21 Jan 2021 04:25) (90% - 93%)    PHYSICAL EXAM:  tele f/u      LABS:                        8.8    15.33 )-----------( 223      ( 21 Jan 2021 06:43 )             28.0     01-21    149<H>  |  122<H>  |  51<H>  ----------------------------<  117<H>  3.5   |  19<L>  |  1.40<H>    Ca    8.1<L>      21 Jan 2021 06:43    TPro  5.8<L>  /  Alb  2.3<L>  /  TBili  0.6  /  DBili  x   /  AST  32  /  ALT  23  /  AlkPhos  79  01-21    PT/INR - ( 20 Jan 2021 09:28 )   PT: 19.6 sec;   INR: 1.72 ratio               RADIOLOGY & ADDITIONAL TESTS:   Called for echo.

## 2021-01-21 NOTE — PROGRESS NOTE ADULT - ASSESSMENT
85F, SNF, HTN, HL, Afib/coumadin, hx CVA/aneurysm repair, infrarenal AAA, PVD/RLE stents, RA; admitted for acute hypoxic resp failure, COVID19 PNA, colitis, sepsis and Afib RVR    Sepsis   -In setting of COVID-19 PNA, colitis  -HR normalized, persistent leukocytosis (on steroids) but improving  -Continue on IV abxs, pulmonary regimen  -f/u cxs : NGT    COVID19 PNA complicated with  hypoxic resp failure and sepsis   -Remdesivir held 2/2 renal failure  -Continue on dexamethasone - plan for 10d course  -Continued suppl O2 support - on 3.0L currently - wean as tolerated  -Continue airborne/contact precautions  -Continue trending inflammatory markers as clinically necessary  -Pulm/ID following    Colitis  - assoc w/abd pain   -Started on IV abxs: zosyn and downgraded to cefepime and flagyl  -monitoring for diarrhea - plan for check Cdiff if +diarrhea  -tolerating reg diet  -GI following     BRIDGET   -likely multifactorial :Prerenal azotemia, ATN  -Cr 2.4 (1/18)->1.8 (1/19) ->1.5 (1/20)---- now downtrending,   -renally dose meds, avoid nephrotoxics  -Bateman in place for accurate I/O monitoring , if continues downtrending Cr -plan for Bateman removal   -Nephrology following     Leukocytosis  -improving  -Afebrile, BCx negative  -On steroids treatment  -continue trending     Hypernatremia  -Na 149  -nephrology following, if keep trending up will start D5W  -encourage hydration    Afib/RVR  -In setting of acute infection  -No evidence of acute ischemia, no volume overload  -remains rate controlled on metoprolol, warfarin restarted, INR normalized  -Cardiology following , appreciate recommendations  -Continue monitoring lytes    Supratherap INR/coumadin toxicity  -INR elev 7.01-->1.7 after single dose of Vit K  -Continue trending INR    Anemia  -baseline 10-11, likely multifactorial  -iron studies ordered     Spoked with patient's daughter Dixie Schmitt @ [phone], gave her and update about her mother.

## 2021-01-22 NOTE — PROGRESS NOTE ADULT - SUBJECTIVE AND OBJECTIVE BOX
Patient is a 85y old  Female who presents with a chief complaint of AMS, lethargy (21 Jan 2021 16:48)    ----  INTERVAL HPI/OVERNIGHT EVENTS: Pt seen and evaluated at the bedside. overnight event of desaturation occurred, patient was assessed and upgraded from NC to NR. discussed with nurse. 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  Osteoarthritis  Afib  PVD (peripheral vascular disease)  Neuropathy  (Right lower leg)  H/O cerebral aneurysm repair  brain clips  CVA (cerebral vascular accident)  Rheumatoid arthritis  Hyperlipidemia  Hypertension    Surgical hx  S/P cataract surgery  (Left eye)    Elective surgery  (&quot;Twisted bowel&quot;, 2014)    Elective surgery  (Exision of cyst on liver, 1985)    S/P ORIF (open reduction internal fixation) fracture  Left hip fx (2012) &amp; R hip fx (2013)    H/O cerebral aneurysm repair  Brain clips (1978(    Renal stone  Cysto stent placement 10/1/2014    PVD (peripheral vascular disease)  s/p RLE bypass x 3, most recent 3/2012 Right external iliac to PT bypass w/ PTFE (2012)    S/P FRED (total abdominal hysterectomy)  (1987, Hx of ovarian cancer?)      FAMILY HISTORY:  No pertinent family history in first degree relatives     Allergies; No Known Allergies    Intolerances        ----  MEDICATIONS  (STANDING):  ascorbic acid 500 milliGRAM(s) Oral daily  aspirin enteric coated 81 milliGRAM(s) Oral daily  atorvastatin 40 milliGRAM(s) Oral at bedtime  budesonide  80 MICROgram(s)/formoterol 4.5 MICROgram(s) Inhaler 2 Puff(s) Inhalation two times a day  cefepime   IVPB      cefepime   IVPB 1000 milliGRAM(s) IV Intermittent every 24 hours  dexAMETHasone  Injectable 6 milliGRAM(s) IV Push daily  diltiazem    milliGRAM(s) Oral daily  gabapentin 100 milliGRAM(s) Oral three times a day  lactobacillus acidophilus 1 Tablet(s) Oral three times a day  metoprolol tartrate 12.5 milliGRAM(s) Oral every 6 hours  metroNIDAZOLE    Tablet 500 milliGRAM(s) Oral every 8 hours  multivitamin 1 Tablet(s) Oral daily  pantoprazole    Tablet 40 milliGRAM(s) Oral every 12 hours  sodium chloride 0.65% Nasal 1 Spray(s) Both Nostrils three times a day  tiotropium 18 MICROgram(s) Capsule 1 Capsule(s) Inhalation daily    MEDICATIONS  (PRN):  acetaminophen   Tablet .. 650 milliGRAM(s) Oral every 6 hours PRN Temp greater or equal to 38C (100.4F), Mild Pain (1 - 3)  ALBUTerol    90 MICROgram(s) HFA Inhaler 2 Puff(s) Inhalation every 3 hours PRN Shortness of Breath and/or Wheezing  guaiFENesin   Syrup  (Sugar-Free) 200 milliGRAM(s) Oral every 6 hours PRN Cough      ----  REVIEW OF SYSTEMS:  SKIN: denies  rash  CARDIOVASCULAR: denies chest pain, chest pressure, palpitations  RESPIRATORY: denies shortness of breath, sputum production  GASTROINTESTINAL: denies nausea, vomiting, diarrhea, abdominal pain  NEUROLOGICAL: denies numbness, headache, focal weakness  MUSCULOSKELETAL: denies new joint pain, muscle aches  HEMATOLOGIC: denies gross bleeding, bruising  PSYCHIATRIC: denies recent changes in anxiety, depression  ENDOCRINOLOGIC: denies sweating, cold or heat intolerance    ----  PHYSICAL EXAM:  GENERAL: patient appears well, no acute distress, NR  EYES: sclera clear, no exudates  ENT: moist mucous membranes  NECK: supple, soft, no thyromegaly noted  LUNGS:  clear to auscultation but decrease breath sounds, no wheezing or rhonchi, no labored breathing  HEART: soft S1/S2, regular rate and rhythm, no murmurs noted, no noted edema to b/l LE  GASTROINTESTINAL: abdomen is soft, nontender, nondistended, normoactive bowel sounds, no palpable masses  INTEGUMENT: poor skin turgor, no visualized rashes, no jaundice noted  MUSCULOSKELETAL: no clubbing or cyanosis, no obvious deformity  NEUROLOGIC: awake, alert, no focal signs    T(C): 36.8 (01-21-21 @ 11:40), Max: 36.8 (01-20-21 @ 20:52)  HR: 70 (01-21-21 @ 16:41) (66 - 79)  BP: 155/82 (01-21-21 @ 16:41) (139/65 - 165/80)  RR: 15 (01-21-21 @ 11:40) (15 - 17)  SpO2: 93% (01-21-21 @ 11:40) (90% - 93%)  Wt(kg): --    ----  I&O's Summary    20 Jan 2021 07:01  -  21 Jan 2021 07:00  --------------------------------------------------------  IN: 0 mL / OUT: 750 mL / NET: -750 mL        LABS:                        8.8    15.33 )-----------( 223      ( 21 Jan 2021 06:43 )             28.0     01-21    149<H>  |  122<H>  |  51<H>  ----------------------------<  117<H>  3.5   |  19<L>  |  1.40<H>    Ca    8.1<L>      21 Jan 2021 06:43    TPro  5.8<L>  /  Alb  2.3<L>  /  TBili  0.6  /  DBili  x   /  AST  32  /  ALT  23  /  AlkPhos  79  01-21    PT/INR - ( 20 Jan 2021 09:28 )   PT: 19.6 sec;   INR: 1.72 ratio             CAPILLARY BLOOD GLUCOSE

## 2021-01-22 NOTE — CONSULT NOTE ADULT - CONSULT REQUESTED DATE/TIME
17-Jan-2021 09:13
16-Jan-2021 09:21
16-Jan-2021 10:16
16-Jan-2021 10:49
16-Jan-2021 11:14
18-Jan-2021 15:19
22-Jan-2021 15:11
17-Jan-2021 12:09

## 2021-01-22 NOTE — PROGRESS NOTE ADULT - ASSESSMENT
85F, SNF, HTN, HL, Afib/coumadin, hx CVA/aneurysm repair, infrarenal AAA, PVD/RLE stents, RA; admitted for acute hypoxic resp failure, COVID19 PNA, colitis, sepsis and Afib RVR    Sepsis   -In setting of COVID-19 PNA, colitis  -HR normalized, persistent leukocytosis (on steroids) but improving  -Continue on IV abxs, pulmonary regimen  -f/u cxs : NGT    COVID19 PNA complicated with  hypoxic resp failure and sepsis   -Remdesivir held 2/2 renal failure  -Continue on dexamethasone - plan for 10d course  -Continued suppl O2 support   -Continue airborne/contact precautions  -Continue trending inflammatory markers as clinically necessary  -Pulm/ID following    Colitis  - assoc w/abd pain   -Started on IV abxs: zosyn and downgraded to cefepime and flagyl  -monitoring for diarrhea - plan for check Cdiff if +diarrhea  -tolerating reg diet  -GI following     BRIDGET   -likely multifactorial :Prerenal azotemia, ATN  -Cr 2.4 (1/18)->1.8 (1/19) ->1.5 (1/20)---- now downtrending,   -renally dose meds, avoid nephrotoxics  -Bateman in place for accurate I/O monitoring , if continues downtrending Cr -plan for Bateman removal   -Nephrology following     Leukocytosis  -improving  -Afebrile, BCx negative  -On steroids treatment  -continue trending     Hypernatremia  -Na 149  -nephrology following, if keep trending up will start D5W  -encourage hydration    Afib/RVR  -In setting of acute infection  -No evidence of acute ischemia, no volume overload  -remains rate controlled on metoprolol, warfarin restarted, INR subtherapeutic  -Cardiology following , appreciate recommendations  -Continue monitoring lytes    Supratherap INR/coumadin toxicity  -INR elev 7.01-->1.7 after single dose of Vit K  -Continue trending INR    Anemia  -baseline 10-11, likely multifactorial  -iron studies ordered

## 2021-01-22 NOTE — PROGRESS NOTE ADULT - SUBJECTIVE AND OBJECTIVE BOX
Patient is a 85y old  Female who presents with a chief complaint of AMS, lethargy (17 Jan 2021 09:12)  Patient seen in follow up for BRIDGET.     Pt resting in bed. 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    Osteoarthritis    Afib    PVD (peripheral vascular disease)    Neuropathy    Gout    H/O cerebral aneurysm repair    CVA (cerebral vascular accident)    Rheumatoid arthritis    Asthma    Hyperlipidemia    Hypertension      MEDICATIONS  (STANDING):  ascorbic acid 500 milliGRAM(s) Oral daily  aspirin enteric coated 81 milliGRAM(s) Oral daily  atorvastatin 40 milliGRAM(s) Oral at bedtime  budesonide  80 MICROgram(s)/formoterol 4.5 MICROgram(s) Inhaler 2 Puff(s) Inhalation two times a day  cefepime   IVPB      cefepime   IVPB 1000 milliGRAM(s) IV Intermittent every 24 hours  dexAMETHasone  Injectable 6 milliGRAM(s) IV Push daily  diltiazem    milliGRAM(s) Oral daily  gabapentin 100 milliGRAM(s) Oral three times a day  lactobacillus acidophilus 1 Tablet(s) Oral three times a day  metoprolol tartrate 12.5 milliGRAM(s) Oral every 6 hours  metroNIDAZOLE    Tablet 500 milliGRAM(s) Oral every 8 hours  multivitamin 1 Tablet(s) Oral daily  pantoprazole    Tablet 40 milliGRAM(s) Oral every 12 hours  sodium chloride 0.65% Nasal 1 Spray(s) Both Nostrils three times a day  tiotropium 18 MICROgram(s) Capsule 1 Capsule(s) Inhalation daily    MEDICATIONS  (PRN):  acetaminophen   Tablet .. 650 milliGRAM(s) Oral every 6 hours PRN Temp greater or equal to 38C (100.4F), Mild Pain (1 - 3)  ALBUTerol    90 MICROgram(s) HFA Inhaler 2 Puff(s) Inhalation every 3 hours PRN Shortness of Breath and/or Wheezing  guaiFENesin   Syrup  (Sugar-Free) 200 milliGRAM(s) Oral every 6 hours PRN Cough    T(C): 37.2 (01-22-21 @ 11:37), Max: 37.8 (01-22-21 @ 11:35)  HR: 84 (01-22-21 @ 11:37) (66 - 119)  BP: 137/86 (01-22-21 @ 11:37) (137/86 - 165/80)  RR: 21 (01-22-21 @ 11:37)  SpO2: 98% (01-22-21 @ 11:37)  Wt(kg): --  I&O's Detail    22 Jan 2021 07:01  -  22 Jan 2021 11:47  --------------------------------------------------------  IN:    Oral Fluid: 240 mL  Total IN: 240 mL    OUT:  Total OUT: 0 mL    Total NET: 240 mL                    PHYSICAL EXAM:  General: No distress  Respiratory: b/l air entry  Cardiovascular: S1 S2  Gastrointestinal: soft, + tenderness  Extremities:  no edema                         LABORATORY:                        8.8    15.33 )-----------( 223      ( 21 Jan 2021 06:43 )             28.0     01-21    149<H>  |  122<H>  |  51<H>  ----------------------------<  117<H>  3.5   |  19<L>  |  1.40<H>    Ca    8.1<L>      21 Jan 2021 06:43    TPro  5.8<L>  /  Alb  2.3<L>  /  TBili  0.6  /  DBili  x   /  AST  32  /  ALT  23  /  AlkPhos  79  01-21    Sodium, Serum: 149 mmol/L (01-21 @ 06:43)    Potassium, Serum: 3.5 mmol/L (01-21 @ 06:43)    Hemoglobin: 8.8 g/dL (01-21 @ 06:43)  Hemoglobin: 9.2 g/dL (01-20 @ 09:28)    Creatinine, Serum 1.40 (01-21 @ 06:43)  Creatinine, Serum 1.50 (01-20 @ 09:28)        LIVER FUNCTIONS - ( 21 Jan 2021 06:43 )  Alb: 2.3 g/dL / Pro: 5.8 g/dL / ALK PHOS: 79 U/L / ALT: 23 U/L / AST: 32 U/L / GGT: x             ABG - ( 22 Jan 2021 08:51 )  pH, Arterial: 7.46  pH, Blood: x     /  pCO2: 25    /  pO2: 113   / HCO3: 20    / Base Excess: -5.6  /  SaO2: 98

## 2021-01-22 NOTE — PROGRESS NOTE ADULT - ASSESSMENT
85f well known to our office and sees Dr. Laughlin.  She has afib (on Coumadin), GIB, CVA and h/o intracranial aneurysm repair, infrarenal AAA, HTN, HLD, mitral valve prolapse, PVD of RLE with failed bypass, failed redo bypass and failed multiple stents, with persistent occlusion.  She has rheumatoid arthritis, s/p hysterectomy, and L cataract.  She is known to have a borderline lvef of 50 by echo done 1 week ago when she was hosp with pna and an acute abd process.  At that time she was more rapid af given that she was not taking her meds, and her meds were adjusted to include metop 12.5 q6 and dilt 240.  Metop was changed to xl 100 at [facility] from [facility] for hypoxia, fever and lethargy. She has been treated for COVID pna/resp failure and colitis. She is being treated with decadron for COVID, but her BRIDGET precludes her from receiving remdesivir.  GI/ID following for colitis and recently switched to cefepime and flagyl.  She has had worsened issues with hypoxia and hypotension, and both icu and cardiology were consulted this am.      Afib with RVR  - In the setting of infection, agitation sp restraints overnight   - No evidence of acute ischemia or volume overload  - Continue BB and CCB  - Continue Coumadin. INR normalized s/p Vit K   - TTE showed low-normal LVSF, EF 50%, normal RVF, with moderate MR.  No need to repeat unless change in cardiac status    Covid Pna  - Supplemental O2 as needed, now on NRB  - Supportive care  - Continue current management  - Follow ID and Pulm recs    Hx of CVA  - Continue ASA and statin      - Monitor and replete lytes, keep K>4, Mg>2.  - All other medical needs as per primary team.  - Other cardiovascular workup will depend on clinical course.  - Will continue to follow.  Renita LANDONP-C  Cardiology NP  [id number] [phone]

## 2021-01-22 NOTE — CONSULT NOTE ADULT - SUBJECTIVE AND OBJECTIVE BOX
HPI:  84yo F, with PMH afib (on Coumadin), CVA and h/o aneurysm repair, infrarenal AAA, HTN, HLD, mitral valve prolapse, PVD of RLE s/p stents x3, rheumatoid arthritis, s/p hysterectomy, and L cataract, BIBEMS from [facility] for hypoxia, fever and lethargy. Pt was found to be satting 87% on RA and temperature of 101 F as per facility documentation. Pt tested positive for COVID on 1/12/21. Pt was lethargic and history is unable to be obtained.  After a few hours when seen, pt was more alert and complaining of diffuse abdominal pain.   Of note, pt was recently admitted to [facility] (1/5/21-1/8/21) for multifocal PNA and was discharged on antibiotics and supplemental oxygen.   History was obtained from daughter Dixie.   As per daughter, pt     ED Course:   Vitals: BP: 137/95, HR:103 , Temp: 97.6 F, RR: 16, SpO2: 100% on NRB -->100% on 3L NC, desaturates to 90% on RA   Labs: WBC: 11.27, H/H: 11.2/34.6, neut: 10.35, ddimer: 777, PT/INR: 19/1.66, lactate: 1.0, bicarb: 17, BUN/Cr: 41/1.70, Ca: 7.6, GFR: 27  COVID PCR POSITIVE   VBG: pH: 7.37, pCO2: 36, pO2: 65, HCO3: 21, SpO2: 90%    Imaging:   CXR: left sided infiltrate improved when compared to CXR from 1/5 - pending official read   CT Head: No significant interval change compared to the previous CT of January 5, 2021.No intracranial hemorrhage, mass effect or large acute cortical infarct. Focal old left frontal and temporal encephalomalacia.  Old bilateral lacunar infarcts.  CT Chest: Marked interval improvement in multifocal pneumonia.  Emphysema. Compared to the recent CT of January 5, 2021.  CT Abdomen and Pelvis: Interval development of colitis of the sigmoid colon and rectum, of infectious or inflammatory etiology. Redemonstration of gallstones.  EKG: afib, HR: 105, no ST elevation or depression   Received Tylenol, Zosyn, NS 1L bolus x1 in the ED   (16 Jan 2021 04:50)    PERTINENT PM/SXH:   OM (osteomyelitis)    Mitral valve prolapse    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    Osteoarthritis    Afib    PVD (peripheral vascular disease)    Neuropathy    Gout    H/O cerebral aneurysm repair    CVA (cerebral vascular accident)    Rheumatoid arthritis    Asthma    Hyperlipidemia    Hypertension      S/P cataract surgery    Elective surgery    Elective surgery    S/P ORIF (open reduction internal fixation) fracture    H/O cerebral aneurysm repair    Renal stone    PVD (peripheral vascular disease)    S/P FRED (total abdominal hysterectomy)      FAMILY HISTORY:  No pertinent family history in first degree relatives      ITEMS NOT CHECKED ARE NOT PRESENT    SOCIAL HISTORY:   Significant other/partner[ ]  Children[ ]  Yazidi/Spirituality:  Substance hx:  [ ]   Tobacco hx:  [ ]   Alcohol hx: [ ]   Home Opioid hx:  [ ] I-Stop Reference No:  Living Situation: [ ]Home  [ ]Long term care  [ ]Rehab [ ]Other    ADVANCE DIRECTIVES:    DNR  MOLST  [ ]  Living Will  [ ]   DECISION MAKER(s):  [ ] Health Care Proxy(s)  [ ] Surrogate(s)  [ ] Guardian           Name(s): Phone Number(s):    BASELINE (I)ADL(s) (prior to admission):  [place]: [ ]Total  [ ] Moderate [ ]Dependent    Allergies    No Known Allergies    Intolerances    MEDICATIONS  (STANDING):  ascorbic acid 500 milliGRAM(s) Oral daily  aspirin enteric coated 81 milliGRAM(s) Oral daily  atorvastatin 40 milliGRAM(s) Oral at bedtime  budesonide  80 MICROgram(s)/formoterol 4.5 MICROgram(s) Inhaler 2 Puff(s) Inhalation two times a day  cefepime   IVPB      cefepime   IVPB 1000 milliGRAM(s) IV Intermittent every 24 hours  dexAMETHasone  Injectable 6 milliGRAM(s) IV Push daily  diltiazem    milliGRAM(s) Oral daily  gabapentin 100 milliGRAM(s) Oral three times a day  iron sucrose IVPB 100 milliGRAM(s) IV Intermittent every 24 hours  lactobacillus acidophilus 1 Tablet(s) Oral three times a day  metoprolol tartrate 12.5 milliGRAM(s) Oral every 6 hours  metroNIDAZOLE    Tablet 500 milliGRAM(s) Oral every 8 hours  multivitamin 1 Tablet(s) Oral daily  pantoprazole    Tablet 40 milliGRAM(s) Oral every 12 hours  sodium chloride 0.65% Nasal 1 Spray(s) Both Nostrils three times a day  tiotropium 18 MICROgram(s) Capsule 1 Capsule(s) Inhalation daily  warfarin 2.5 milliGRAM(s) Oral once    MEDICATIONS  (PRN):  acetaminophen   Tablet .. 650 milliGRAM(s) Oral every 6 hours PRN Temp greater or equal to 38C (100.4F), Mild Pain (1 - 3)  ALBUTerol    90 MICROgram(s) HFA Inhaler 2 Puff(s) Inhalation every 3 hours PRN Shortness of Breath and/or Wheezing  guaiFENesin   Syrup  (Sugar-Free) 200 milliGRAM(s) Oral every 6 hours PRN Cough    PRESENT SYMPTOMS: [ ]Unable to obtain due to poor mentation   Source if other than patient:  [ ]Family   [ ]Team     Pain: [ ]yes [ ]no  QOL impact -   Location -                    Aggravating factors -  Quality -  Radiation -  Timing-  Severity (0-10 scale):  Minimal acceptable level (0-10 scale):     CPOT:    https://www.[facility].org/getattachment/[email or web address]/Critical-Care-Pain-Observation-Tool-(CPOT)      PAIN AD Score:     http://geriatrictoolkit.[facility]/cog/painad.pdf (press ctrl +  left click to view)    Dyspnea:                           [ ]Mild [ ]Moderate [ ]Severe  Anxiety:                             [ ]Mild [ ]Moderate [ ]Severe  Fatigue:                             [ ]Mild [ ]Moderate [ ]Severe  Nausea:                             [ ]Mild [ ]Moderate [ ]Severe  Loss of appetite:              [ ]Mild [ ]Moderate [ ]Severe  Constipation:                    [ ]Mild [ ]Moderate [ ]Severe    Other Symptoms:  [ ]All other review of systems negative     Palliative Performance Status Version 2:         %    http://npcrc.org/files/news/palliative_performance_scale_ppsv2.pdf  PHYSICAL EXAM:  Vital Signs Last 24 Hrs  T(C): 37.2 (22 Jan 2021 11:37), Max: 37.8 (22 Jan 2021 11:35)  T(F): 99 (22 Jan 2021 11:37), Max: 100.1 (22 Jan 2021 11:35)  HR: 84 (22 Jan 2021 11:37) (70 - 119)  BP: 137/86 (22 Jan 2021 11:37) (137/86 - 155/82)  BP(mean): --  RR: 21 (22 Jan 2021 11:37) (19 - 21)  SpO2: 98% (22 Jan 2021 11:37) (89% - 98%) I&O's Summary    22 Jan 2021 07:01  -  22 Jan 2021 15:11  --------------------------------------------------------  IN: 290 mL / OUT: 275 mL / NET: 15 mL      GENERAL:  [ ]Alert  [ ]Oriented x   [ ]Lethargic  [ ]Cachexia  [ ]Unarousable  [ ]Verbal  [ ]Non-Verbal  Behavioral:   [ ] Anxiety  [ ] Delirium [ ] Agitation [ ] Other  HEENT:  [ ]Normal   [ ]Dry mouth   [ ]ET Tube/Trach  [ ]Oral lesions  PULMONARY:   [ ]Clear [ ]Tachypnea  [ ]Audible excessive secretions   [ ]Rhonchi        [ ]Right [ ]Left [ ]Bilateral  [ ]Crackles        [ ]Right [ ]Left [ ]Bilateral  [ ]Wheezing     [ ]Right [ ]Left [ ]Bilateral  [ ]Diminished breath sounds [ ]right [ ]left [ ]bilateral  CARDIOVASCULAR:    [ ]Regular [ ]Irregular [ ]Tachy  [ ]Eduard [ ]Murmur [ ]Other  GASTROINTESTINAL:  [ ]Soft  [ ]Distended   [ ]+BS  [ ]Non tender [ ]Tender  [ ]PEG [ ]OGT/ NGT  Last BM:     GENITOURINARY:  [ ]Normal [ ] Incontinent   [ ]Oliguria/Anuria   [ ][place]  MUSCULOSKELETAL:   [ ]Normal   [ ]Weakness  [ ]Bed/Wheelchair bound [ ]Edema  NEUROLOGIC:   [ ]No focal deficits  [ ]Cognitive impairment  [ ]Dysphagia [ ]Dysarthria [ ]Paresis [ ]Other   SKIN:   [ ]Normal    [ ]Rash  [ ]Pressure ulcer(s)       Present on admission [ ]y [ ]n    CRITICAL CARE:  [ ] Shock Present  [ ]Septic [ ]Cardiogenic [ ]Neurologic [ ]Hypovolemic  [ ]  Vasopressors [ ]  Inotropes   [ ]Respiratory failure present [ ]Mechanical ventilation [ ]Non-invasive ventilatory support [ ]High flow    [ ]Acute  [ ]Chronic [ ]Hypoxic  [ ]Hypercarbic [ ]Other  [ ]Other organ failure     LABS:                        8.8    15.33 )-----------( 223      ( 21 Jan 2021 06:43 )             28.0   01-21    149<H>  |  122<H>  |  51<H>  ----------------------------<  117<H>  3.5   |  19<L>  |  1.40<H>    Ca    8.1<L>      21 Jan 2021 06:43    TPro  5.8<L>  /  Alb  2.3<L>  /  TBili  0.6  /  DBili  x   /  AST  32  /  ALT  23  /  AlkPhos  79  01-21  PT/INR - ( 22 Jan 2021 13:49 )   PT: 17.5 sec;   INR: 1.53 ratio               RADIOLOGY & ADDITIONAL STUDIES:    PROTEIN CALORIE MALNUTRITION PRESENT: [ ]mild [ ]moderate [ ]severe [ ]underweight [ ]morbid obesity  https://www.andeal.org/vault/2440/web/files/ONC/Table_Clinical%20Characteristics%20to%20Document%20Malnutrition-White%20JV%20et%20al%692307.pdf    Height (cm): 154.9 (01-15-21 @ 21:24), 154.9 (01-05-21 @ 11:50)  Weight (kg): 58.5 (01-15-21 @ 21:24), 63.5 (01-05-21 @ 11:50)  BMI (kg/m2): 24.4 (01-15-21 @ 21:24), 26.5 (01-05-21 @ 11:50)    [ ]PPSV2 < or = to 30% [ ]significant weight loss  [ ]poor nutritional intake  [ ]anasarca     Albumin, Serum: 2.3 g/dL (01-21-21 @ 06:43)   [ ]Artificial Nutrition      REFERRALS:   [ ]Chaplaincy  [ ]Hospice  [ ]Child Life  [ ]Social Work  [ ]Case management [ ]Holistic Therapy     Goals of Care Document: JACINTO Canela (01-19-21 @ 12:05)  Goals of Care Conversation:   Participants   · Participants  Family  · Child(john)  Dixie Schmitt    Advance Directives   · Does patient have Advance Directive  Yes  · Indicate Type  Health Care Proxy (HCP)  · Agent's Name  Dixie Schmitt  · Phone #  [phone]  · Are any of the items on the chart  med rec  · Caregiver:  no    Conversation Discussion   · Conversation  MOLST Discussed; Palliative Care Referral  · Conversation Details  Writer spoke withdtr/HCP Dixie Schmitt. Reviewed patient's medical and social history as well as events leading to patient's hospitalization. Writer discussed patient's current diagnosis (Covid,MVP,HTN,PVD,A Fib), medical condition and management, prognosis, and life expectancy. Inquired about patient's wishes regarding extent of medical care to be provided including escalation of medical care into the ICU and use of vasopressor support. In addition, the writer inquired about thoughts regarding cardiopulmnary resuscitation, artificial nutrition and hydration including use of feeding tubes and IVF, antibiotics, and further investigative studies such as blood draws and radiology. Dixie showed some insight into medical condition. All questions answered. Writer recommended she have a conversation with her mother to know her wishes at this time) Psychosocial support provided.    Location of Discussion:   Duration of Advanced Care Planning Meeting   · Time spent (in minutes)  20    Location of Discussion   · Location of discussion  Telephone    Electronic Signatures for Addendum Section:   Petr Bai) (Signed Addendum 20-Jan-2021 07:39)    I attest that the writer and palliative care team RN discussed pt's current medical condition, hospital stay, prognosis, disease trajectory, and life expectancy. (refer to ACP/GOC note from palliative care team RN for more details on conversation).    Electronic Signatures:  Petr Bai)  (Signed 20-Jan-2021 07:39)  	Co-Signer: Goals of Care Conversation, Location of Discussion  Kassidy Sky (RN)  (Signed 19-Jan-2021 12:11)  	Authored: Goals of Care Conversation, Location of Discussion      Last Updated: 20-Jan-2021 07:39 by Petr Bai)     HPI:  86yo F, with PMH afib (on Coumadin), CVA and h/o aneurysm repair, infrarenal AAA, HTN, HLD, mitral valve prolapse, PVD of RLE s/p stents x3, rheumatoid arthritis, s/p hysterectomy, and L cataract, BIBEMS from [facility] for hypoxia, fever and lethargy. Pt was found to be satting 87% on RA and temperature of 101 F as per facility documentation. Pt tested positive for COVID on 1/12/21. Pt was lethargic and history is unable to be obtained.  After a few hours when seen, pt was more alert and complaining of diffuse abdominal pain.   Of note, pt was recently admitted to [facility] (1/5/21-1/8/21) for multifocal PNA and was discharged on antibiotics and supplemental oxygen.   History was obtained from daughter Dixie.   As per daughter, pt     ED Course:   Vitals: BP: 137/95, HR:103 , Temp: 97.6 F, RR: 16, SpO2: 100% on NRB -->100% on 3L NC, desaturates to 90% on RA   Labs: WBC: 11.27, H/H: 11.2/34.6, neut: 10.35, ddimer: 777, PT/INR: 19/1.66, lactate: 1.0, bicarb: 17, BUN/Cr: 41/1.70, Ca: 7.6, GFR: 27  COVID PCR POSITIVE   VBG: pH: 7.37, pCO2: 36, pO2: 65, HCO3: 21, SpO2: 90%    Imaging:   CXR: left sided infiltrate improved when compared to CXR from 1/5 - pending official read   CT Head: No significant interval change compared to the previous CT of January 5, 2021.No intracranial hemorrhage, mass effect or large acute cortical infarct. Focal old left frontal and temporal encephalomalacia.  Old bilateral lacunar infarcts.  CT Chest: Marked interval improvement in multifocal pneumonia.  Emphysema. Compared to the recent CT of January 5, 2021.  CT Abdomen and Pelvis: Interval development of colitis of the sigmoid colon and rectum, of infectious or inflammatory etiology. Redemonstration of gallstones.  EKG: afib, HR: 105, no ST elevation or depression   Received Tylenol, Zosyn, NS 1L bolus x1 in the ED   (16 Jan 2021 04:50)    PERTINENT PM/SXH:   OM (osteomyelitis)    Mitral valve prolapse    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    Osteoarthritis    Afib    PVD (peripheral vascular disease)    Neuropathy    Gout    H/O cerebral aneurysm repair    CVA (cerebral vascular accident)    Rheumatoid arthritis    Asthma    Hyperlipidemia    Hypertension      S/P cataract surgery    Elective surgery    Elective surgery    S/P ORIF (open reduction internal fixation) fracture    H/O cerebral aneurysm repair    Renal stone    PVD (peripheral vascular disease)    S/P FRED (total abdominal hysterectomy)      FAMILY HISTORY:  No pertinent family history in first degree relatives      ITEMS NOT CHECKED ARE NOT PRESENT    SOCIAL HISTORY:   Significant other/partner[ ]  Children[x ]  Adventism/Spirituality: Shinto  Substance hx:  [ ]   Tobacco hx:  [ ]   Alcohol hx: [ ]   Home Opioid hx:  [ ] I-Stop Reference No:  Living Situation: [ ]Home  [ ]Long term care  [ x]Rehab [ ]Other    ADVANCE DIRECTIVES:    DNR  MOLST  [ ]  Living Will  [ ]   DECISION MAKER(s):  [x ] Health Care Proxy(s)  [ ] Surrogate(s)  [ ] Guardian           Name(s): Dixie Schmitt   Phone Number(s):    BASELINE (I)ADL(s) (prior to admission):  [place]: [ ]Total  [ ] Moderate [x ]Dependent    Allergies    No Known Allergies    Intolerances    MEDICATIONS  (STANDING):  ascorbic acid 500 milliGRAM(s) Oral daily  aspirin enteric coated 81 milliGRAM(s) Oral daily  atorvastatin 40 milliGRAM(s) Oral at bedtime  budesonide  80 MICROgram(s)/formoterol 4.5 MICROgram(s) Inhaler 2 Puff(s) Inhalation two times a day  cefepime   IVPB      cefepime   IVPB 1000 milliGRAM(s) IV Intermittent every 24 hours  dexAMETHasone  Injectable 6 milliGRAM(s) IV Push daily  diltiazem    milliGRAM(s) Oral daily  gabapentin 100 milliGRAM(s) Oral three times a day  iron sucrose IVPB 100 milliGRAM(s) IV Intermittent every 24 hours  lactobacillus acidophilus 1 Tablet(s) Oral three times a day  metoprolol tartrate 12.5 milliGRAM(s) Oral every 6 hours  metroNIDAZOLE    Tablet 500 milliGRAM(s) Oral every 8 hours  multivitamin 1 Tablet(s) Oral daily  pantoprazole    Tablet 40 milliGRAM(s) Oral every 12 hours  sodium chloride 0.65% Nasal 1 Spray(s) Both Nostrils three times a day  tiotropium 18 MICROgram(s) Capsule 1 Capsule(s) Inhalation daily  warfarin 2.5 milliGRAM(s) Oral once    MEDICATIONS  (PRN):  acetaminophen   Tablet .. 650 milliGRAM(s) Oral every 6 hours PRN Temp greater or equal to 38C (100.4F), Mild Pain (1 - 3)  ALBUTerol    90 MICROgram(s) HFA Inhaler 2 Puff(s) Inhalation every 3 hours PRN Shortness of Breath and/or Wheezing  guaiFENesin   Syrup  (Sugar-Free) 200 milliGRAM(s) Oral every 6 hours PRN Cough    PRESENT SYMPTOMS: [x ]Unable to obtain due to poor mentation   Source if other than patient:  [ ]Family   [ ]Team     Pain: [ ]yes [ ]no  QOL impact -   Location -                    Aggravating factors -  Quality -  Radiation -  Timing-  Severity (0-10 scale):  Minimal acceptable level (0-10 scale):     CPOT:    https://www.[facility].org/getattachment/[email or web address]/Critical-Care-Pain-Observation-Tool-(CPOT)      PAIN AD Score:     http://geriatrictoolkit.[place].[facility]/cog/painad.pdf (press ctrl +  left click to view)    Dyspnea:                           [ ]Mild [ ]Moderate [ ]Severe  Anxiety:                             [ ]Mild [ ]Moderate [ ]Severe  Fatigue:                             [ ]Mild [ ]Moderate [ ]Severe  Nausea:                             [ ]Mild [ ]Moderate [ ]Severe  Loss of appetite:              [ ]Mild [ ]Moderate [ ]Severe  Constipation:                    [ ]Mild [ ]Moderate [ ]Severe    Other Symptoms:  [ ]All other review of systems negative     Palliative Performance Status Version 2:         20%    http://npcrc.org/files/news/palliative_performance_scale_ppsv2.pdf  PHYSICAL EXAM:  Vital Signs Last 24 Hrs  T(C): 37.2 (22 Jan 2021 11:37), Max: 37.8 (22 Jan 2021 11:35)  T(F): 99 (22 Jan 2021 11:37), Max: 100.1 (22 Jan 2021 11:35)  HR: 84 (22 Jan 2021 11:37) (70 - 119)  BP: 137/86 (22 Jan 2021 11:37) (137/86 - 155/82)  BP(mean): --  RR: 21 (22 Jan 2021 11:37) (19 - 21)  SpO2: 98% (22 Jan 2021 11:37) (89% - 98%) I&O's Summary    22 Jan 2021 07:01  -  22 Jan 2021 15:11  --------------------------------------------------------  IN: 290 mL / OUT: 275 mL / NET: 15 mL      GENERAL: ill appearing , lying in bed. NAD, + NRBM  EYES: sclera clear, no exudates  ENT: moist mucous membranes  NECK: supple, soft, no thyromegaly noted  LUNGS:  clear to auscultation but decrease breath sounds, no wheezing or rhonchi, no labored breathing  HEART: soft S1/S2, regular rate and rhythm, no murmurs noted, no noted edema to b/l LE  GASTROINTESTINAL: abdomen is soft, nontender, nondistended, normoactive bowel sounds, no palpable masses  INTEGUMENT: poor skin turgor, no visualized rashes, no jaundice noted  MUSCULOSKELETAL: no clubbing or cyanosis, no obvious deformity  NEUROLOGIC: awake, alert, not oriented, no focal signs  PSYCH; no agitation    LABS:                        8.8    15.33 )-----------( 223      ( 21 Jan 2021 06:43 )             28.0   01-21    149<H>  |  122<H>  |  51<H>  ----------------------------<  117<H>  3.5   |  19<L>  |  1.40<H>    Ca    8.1<L>      21 Jan 2021 06:43    TPro  5.8<L>  /  Alb  2.3<L>  /  TBili  0.6  /  DBili  x   /  AST  32  /  ALT  23  /  AlkPhos  79  01-21  PT/INR - ( 22 Jan 2021 13:49 )   PT: 17.5 sec;   INR: 1.53 ratio               RADIOLOGY & ADDITIONAL STUDIES: reviewed    PROTEIN CALORIE MALNUTRITION PRESENT: [ ]mild [ ]moderate [ ]severe [ ]underweight [ ]morbid obesity  https://www.andeal.org/vault/2440/web/files/ONC/Table_Clinical%20Characteristics%20to%20Document%20Malnutrition-White%20JV%20et%20al%202012.pdf    Height (cm): 154.9 (01-15-21 @ 21:24), 154.9 (01-05-21 @ 11:50)  Weight (kg): 58.5 (01-15-21 @ 21:24), 63.5 (01-05-21 @ 11:50)  BMI (kg/m2): 24.4 (01-15-21 @ 21:24), 26.5 (01-05-21 @ 11:50)    [ ]PPSV2 < or = to 30% [ ]significant weight loss  [ ]poor nutritional intake  [ ]anasarca     Albumin, Serum: 2.3 g/dL (01-21-21 @ 06:43)   [ ]Artificial Nutrition      REFERRALS:   [ ]Chaplaincy  [ ]Hospice  [ ]Child Life  [ ]Social Work  [ ]Case management [ ]Holistic Therapy     Goals of Care Document: JACINTO Canela (01-19-21 @ 12:05)  Goals of Care Conversation:   Participants   · Participants  Family  · Child(john)  Dixie Schmitt    Advance Directives   · Does patient have Advance Directive  Yes  · Indicate Type  Health Care Proxy (HCP)  · Agent's Name  Dixie Schmitt  · Phone #  [phone]  · Are any of the items on the chart  med rec  · Caregiver:  no    Conversation Discussion   · Conversation  MOLST Discussed; Palliative Care Referral  · Conversation Details  Writer spoke withdtr/HCP Dixie Schmitt. Reviewed patient's medical and social history as well as events leading to patient's hospitalization. Writer discussed patient's current diagnosis (Covid,MVP,HTN,PVD,A Fib), medical condition and management, prognosis, and life expectancy. Inquired about patient's wishes regarding extent of medical care to be provided including escalation of medical care into the ICU and use of vasopressor support. In addition, the writer inquired about thoughts regarding cardiopulmnary resuscitation, artificial nutrition and hydration including use of feeding tubes and IVF, antibiotics, and further investigative studies such as blood draws and radiology. Dixie showed some insight into medical condition. All questions answered. Writer recommended she have a conversation with her mother to know her wishes at this time) Psychosocial support provided.    Location of Discussion:   Duration of Advanced Care Planning Meeting   · Time spent (in minutes)  20    Location of Discussion   · Location of discussion  Telephone    Electronic Signatures for Addendum Section:   Petr Bai) (Signed Addendum 20-Jan-2021 07:39)    I attest that the writer and palliative care team RN discussed pt's current medical condition, hospital stay, prognosis, disease trajectory, and life expectancy. (refer to ACP/GOC note from palliative care team RN for more details on conversation).    Electronic Signatures:  Petr Bai)  (Signed 20-Jan-2021 07:39)  	Co-Signer: Goals of Care Conversation, Location of Discussion  Kassidy Sky (RN)  (Signed 19-Jan-2021 12:11)  	Authored: Goals of Care Conversation, Location of Discussion      Last Updated: 20-Jan-2021 07:39 by Petr Bai)

## 2021-01-22 NOTE — CONSULT NOTE ADULT - CONVERSATION DETAILS
Writer together with hospitalist Dr. Taylor spoke with dtr/HCP Dixie. Updated her on pt's worsening resp failure and on use of NRBM at 100% with poor prognosis. Discussed cardiopulmonary resuscitation and recommended DNR/DNI status with continuation of current management plan otherwise. Dtr tearful and in agreement. MOLST form filled out and placed in chart. Video conference allowed to provide viewing of pt. Dtr was grieving appropriately. Psychosocial support provided.    Dtr later called writer in the evening at 7 PM and asked to discuss comfort care. Writer explained what it would entail as well as inpatient hospice at [facility] which dtr is in favor of. Writer recommended to assess pt's medical condition in the morning to assess if she is further declining with signs of end of life or if there's is clinical stability/improvement. Based on pt's condition, follow up discussion to take place with dtr regarding further plan of care. Supportive counseling provided.

## 2021-01-22 NOTE — CONSULT NOTE ADULT - REASON FOR ADMISSION
AMS, lethargy

## 2021-01-22 NOTE — CONSULT NOTE ADULT - ASSESSMENT
86 yo F from SNF with PMHx of HTN, HL, Afib/coumadin, hx CVA/aneurysm repair, infrarenal AAA, PVD/RLE stents, RA; admitted for acute hypoxic resp failure, COVID19 PNA, colitis, sepsis and Afib RVR.    1) Resp failure 2/2 COVID PNA (now on NRBM 100%)  2) Failure to thrive, functional decline, advanced age, SOB, vascular dementia, AMS  3) Goals of care/advance care planning  - Palliative performance scale score: 20% (poor)  - Prognosis: hours-days (pt is hospice eligible)   - Code status: DNR/DNI (MOLST form filled out and placed in the chart)  - GOC: continue current management plan. Dtr contemplating comfort care and d/c to inpatient hospice. Will confirm with her in the morning pending pt's clinical update.    - HCP: Dtr Dixie Schmitt  - Psychosocial support provided    Appreciate consult. Discussed with the primary team.    Petr Rosa MD

## 2021-01-22 NOTE — CONSULT NOTE ADULT - CONSULT REASON
BRIDGET
Elevated INR
Persistent hypoxia and increase WOB
GOC
Fever, leukocytosis, intraabdominal infection, COVID 19
covid  s/p AMS  frailty  weakness  OP  OA
hypotension
abd pain  covid

## 2021-01-22 NOTE — PROGRESS NOTE ADULT - SUBJECTIVE AND OBJECTIVE BOX
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    Osteoarthritis    Afib    PVD (peripheral vascular disease)    Neuropathy  (Right lower leg)    Gout    H/O cerebral aneurysm repair  brain clips    CVA (cerebral vascular accident)  (&quot;Mini-stroke&quot;,1990&#x27;s)    Rheumatoid arthritis    Asthma    Hyperlipidemia    Hypertension    S/P cataract surgery  (Left eye)    Elective surgery  (&quot;Twisted bowel&quot;, 2014)    Elective surgery  (Exision of cyst on liver, 1985)    S/P ORIF (open reduction internal fixation) fracture  Left hip fx (2012) &amp; R hip fx (2013)    H/O cerebral aneurysm repair  Brain clips (1978(    Renal stone  Cysto stent placement 10/1/2014    PVD (peripheral vascular disease)  s/p RLE bypass x 3, most recent 3/2012 Right external iliac to PT bypass w/ PTFE (2012)    S/P FRED (total abdominal hysterectomy)  (1987, Hx of &quot;ovarian cancer?&quot;)          Medication list         MEDICATIONS  (STANDING):  ascorbic acid 500 milliGRAM(s) Oral daily  aspirin enteric coated 81 milliGRAM(s) Oral daily  atorvastatin 40 milliGRAM(s) Oral at bedtime  budesonide  80 MICROgram(s)/formoterol 4.5 MICROgram(s) Inhaler 2 Puff(s) Inhalation two times a day  cefepime   IVPB 1000 milliGRAM(s) IV Intermittent every 24 hours  cefepime   IVPB      dexAMETHasone  Injectable 6 milliGRAM(s) IV Push daily  diltiazem    milliGRAM(s) Oral daily  gabapentin 100 milliGRAM(s) Oral three times a day  lactobacillus acidophilus 1 Tablet(s) Oral three times a day  metoprolol tartrate 12.5 milliGRAM(s) Oral every 6 hours  metroNIDAZOLE    Tablet 500 milliGRAM(s) Oral every 8 hours  multivitamin 1 Tablet(s) Oral daily  pantoprazole    Tablet 40 milliGRAM(s) Oral every 12 hours  sodium chloride 0.65% Nasal 1 Spray(s) Both Nostrils three times a day  tiotropium 18 MICROgram(s) Capsule 1 Capsule(s) Inhalation daily    MEDICATIONS  (PRN):  acetaminophen   Tablet .. 650 milliGRAM(s) Oral every 6 hours PRN Temp greater or equal to 38C (100.4F), Mild Pain (1 - 3)  ALBUTerol    90 MICROgram(s) HFA Inhaler 2 Puff(s) Inhalation every 3 hours PRN Shortness of Breath and/or Wheezing  guaiFENesin   Syrup  (Sugar-Free) 200 milliGRAM(s) Oral every 6 hours PRN Cough         Vitals log        ICU Vital Signs Last 24 Hrs  T(C): 36.3 (22 Jan 2021 04:58), Max: 37.3 (22 Jan 2021 03:05)  T(F): 97.4 (22 Jan 2021 04:58), Max: 99.2 (22 Jan 2021 03:05)  HR: 119 (22 Jan 2021 04:58) (70 - 119)  BP: 151/89 (22 Jan 2021 04:58) (143/93 - 155/82)  BP(mean): --  ABP: --  ABP(mean): --  RR: 19 (22 Jan 2021 04:58) (15 - 20)  SpO2: 95% (22 Jan 2021 04:58) (89% - 96%)           Input and Output:  I&O's Detail    20 Jan 2021 07:01  -  21 Jan 2021 07:00  --------------------------------------------------------  IN:  Total IN: 0 mL    OUT:    Indwelling Catheter - Urethral (mL): 750 mL  Total OUT: 750 mL    Total NET: -750 mL          Lab Data                        8.8    15.33 )-----------( 223      ( 21 Jan 2021 06:43 )             28.0     01-21    149<H>  |  122<H>  |  51<H>  ----------------------------<  117<H>  3.5   |  19<L>  |  1.40<H>    Ca    8.1<L>      21 Jan 2021 06:43    TPro  5.8<L>  /  Alb  2.3<L>  /  TBili  0.6  /  DBili  x   /  AST  32  /  ALT  23  /  AlkPhos  79  01-21            Review of Systems	      Objective     Physical Examination    heart s1s2  lung dec BS  abd soft  on high fio2 support      Pertinent Lab findings & Imaging      Fransico:  NO   Adequate UO     I&O's Detail    20 Jan 2021 07:01  -  21 Jan 2021 07:00  --------------------------------------------------------  IN:  Total IN: 0 mL    OUT:    Indwelling Catheter - Urethral (mL): 750 mL  Total OUT: 750 mL    Total NET: -750 mL               Discussed with:     Cultures:	        Radiology

## 2021-01-22 NOTE — PROGRESS NOTE ADULT - SUBJECTIVE AND OBJECTIVE BOX
INTERVAL HPI/OVERNIGHT EVENTS:  chart reviewed, on nc, interim events noted  no diarrhea, no gib per nursing    MEDICATIONS  (STANDING):  ascorbic acid 500 milliGRAM(s) Oral daily  aspirin enteric coated 81 milliGRAM(s) Oral daily  atorvastatin 40 milliGRAM(s) Oral at bedtime  budesonide  80 MICROgram(s)/formoterol 4.5 MICROgram(s) Inhaler 2 Puff(s) Inhalation two times a day  cefepime   IVPB      cefepime   IVPB 1000 milliGRAM(s) IV Intermittent every 24 hours  dexAMETHasone  Injectable 6 milliGRAM(s) IV Push daily  diltiazem    milliGRAM(s) Oral daily  gabapentin 100 milliGRAM(s) Oral three times a day  lactobacillus acidophilus 1 Tablet(s) Oral three times a day  metoprolol tartrate 12.5 milliGRAM(s) Oral every 6 hours  metroNIDAZOLE    Tablet 500 milliGRAM(s) Oral every 8 hours  multivitamin 1 Tablet(s) Oral daily  pantoprazole    Tablet 40 milliGRAM(s) Oral every 12 hours  sodium chloride 0.65% Nasal 1 Spray(s) Both Nostrils three times a day  tiotropium 18 MICROgram(s) Capsule 1 Capsule(s) Inhalation daily    MEDICATIONS  (PRN):  acetaminophen   Tablet .. 650 milliGRAM(s) Oral every 6 hours PRN Temp greater or equal to 38C (100.4F), Mild Pain (1 - 3)  ALBUTerol    90 MICROgram(s) HFA Inhaler 2 Puff(s) Inhalation every 3 hours PRN Shortness of Breath and/or Wheezing  guaiFENesin   Syrup  (Sugar-Free) 200 milliGRAM(s) Oral every 6 hours PRN Cough      Allergies    No Known Allergies    Intolerances        Review of Systems:  tele f/u    Vital Signs Last 24 Hrs  T(C): 36.7 (21 Jan 2021 04:25), Max: 36.8 (20 Jan 2021 20:52)  T(F): 98.1 (21 Jan 2021 04:25), Max: 98.3 (20 Jan 2021 20:52)  HR: 79 (21 Jan 2021 04:25) (66 - 79)  BP: 154/67 (21 Jan 2021 04:25) (139/65 - 165/80)  BP(mean): --  RR: 17 (21 Jan 2021 04:25) (17 - 17)  SpO2: 93% (21 Jan 2021 04:25) (90% - 93%)    PHYSICAL EXAM:  tele f/u      LABS:                        8.8    15.33 )-----------( 223      ( 21 Jan 2021 06:43 )             28.0     01-21    149<H>  |  122<H>  |  51<H>  ----------------------------<  117<H>  3.5   |  19<L>  |  1.40<H>    Ca    8.1<L>      21 Jan 2021 06:43    TPro  5.8<L>  /  Alb  2.3<L>  /  TBili  0.6  /  DBili  x   /  AST  32  /  ALT  23  /  AlkPhos  79  01-21    PT/INR - ( 20 Jan 2021 09:28 )   PT: 19.6 sec;   INR: 1.72 ratio               RADIOLOGY & ADDITIONAL TESTS:

## 2021-01-22 NOTE — PROVIDER CONTACT NOTE (OTHER) - SITUATION
Received call from [facility] that pt's O2 sat was between 80-82%. Pt is on O2 via n/c at 3L. O2 increased to 6L via n/c without change

## 2021-01-22 NOTE — PROGRESS NOTE ADULT - SUBJECTIVE AND OBJECTIVE BOX
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    Osteoarthritis    Afib    PVD (peripheral vascular disease)    Neuropathy  (Right lower leg)    H/O cerebral aneurysm repair  brain clips    CVA (cerebral vascular accident)  (&quot;Mini-stroke&quot;,1990&#x27;s)    Rheumatoid arthritis    Hyperlipidemia    Hypertension    S/P cataract surgery  (Left eye)    Elective surgery  (&quot;Twisted bowel&quot;, 2014)    Elective surgery  (Exision of cyst on liver, 1985)    S/P ORIF (open reduction internal fixation) fracture  Left hip fx (2012) &amp; R hip fx (2013)    H/O cerebral aneurysm repair  Brain clips (1978(    Renal stone  Cysto stent placement 10/1/2014    PVD (peripheral vascular disease)  s/p RLE bypass x 3, most recent 3/2012 Right external iliac to PT bypass w/ PTFE (2012)    S/P FRED (total abdominal hysterectomy)  (1987, Hx of &quot;ovarian cancer?&quot;)        MEDICATIONS  (STANDING):  ascorbic acid 500 milliGRAM(s) Oral daily  aspirin enteric coated 81 milliGRAM(s) Oral daily  atorvastatin 40 milliGRAM(s) Oral at bedtime  budesonide  80 MICROgram(s)/formoterol 4.5 MICROgram(s) Inhaler 2 Puff(s) Inhalation two times a day  cefepime   IVPB      cefepime   IVPB 1000 milliGRAM(s) IV Intermittent every 24 hours  dexAMETHasone  Injectable 6 milliGRAM(s) IV Push daily  diltiazem    milliGRAM(s) Oral daily  gabapentin 100 milliGRAM(s) Oral three times a day  lactobacillus acidophilus 1 Tablet(s) Oral three times a day  metoprolol tartrate 12.5 milliGRAM(s) Oral every 6 hours  metroNIDAZOLE    Tablet 500 milliGRAM(s) Oral every 8 hours  multivitamin 1 Tablet(s) Oral daily  pantoprazole    Tablet 40 milliGRAM(s) Oral every 12 hours  sodium chloride 0.65% Nasal 1 Spray(s) Both Nostrils three times a day  tiotropium 18 MICROgram(s) Capsule 1 Capsule(s) Inhalation daily    MEDICATIONS  (PRN):  acetaminophen   Tablet .. 650 milliGRAM(s) Oral every 6 hours PRN Temp greater or equal to 38C (100.4F), Mild Pain (1 - 3)  ALBUTerol    90 MICROgram(s) HFA Inhaler 2 Puff(s) Inhalation every 3 hours PRN Shortness of Breath and/or Wheezing  guaiFENesin   Syrup  (Sugar-Free) 200 milliGRAM(s) Oral every 6 hours PRN Cough      Allergies    No Known Allergies    Intolerances        Vital Signs Last 24 Hrs  T(C): 37.6 (22 Jan 2021 07:35), Max: 37.6 (22 Jan 2021 07:35)  T(F): 99.6 (22 Jan 2021 07:35), Max: 99.6 (22 Jan 2021 07:35)  HR: 112 (22 Jan 2021 07:35) (70 - 119)  BP: 151/89 (22 Jan 2021 04:58) (143/93 - 155/82)  BP(mean): --  RR: 19 (22 Jan 2021 07:35) (15 - 20)  SpO2: 94% (22 Jan 2021 07:35) (89% - 96%)    I&O's Summary        PHYSICAL EXAM:  TELE: not on tele   Constitutional: NAD, frail appearing   HEENT: Moist Mucous Membranes, Anicteric  Pulmonary:  breath sounds are diminished   Cardiovascular: Regular, S1 and S2 nl, No murmurs, rubs, gallops or clicks  Gastrointestinal: Bowel Sounds present, soft, nontender.   Lymph: No lymphadenopathy. No peripheral edema.  Skin: No visible rashes or ulcers.  Psych:  Mood & affect appropriate    LABS: All Labs Reviewed:                        8.8    15.33 )-----------( 223      ( 21 Jan 2021 06:43 )             28.0                         9.2    16.47 )-----------( 203      ( 20 Jan 2021 09:28 )             29.4     21 Jan 2021 06:43    149    |  122    |  51     ----------------------------<  117    3.5     |  19     |  1.40   20 Jan 2021 09:28    148    |  120    |  54     ----------------------------<  116    3.6     |  18     |  1.50     Ca    8.1        21 Jan 2021 06:43  Ca    8.4        20 Jan 2021 09:28    TPro  5.8    /  Alb  2.3    /  TBili  0.6    /  DBili  x      /  AST  32     /  ALT  23     /  AlkPhos  79     21 Jan 2021 06:43             ECG:  < from: 12 Lead ECG (01.05.21 @ 12:06) >    Ventricular Rate 135 BPM    Atrial Rate 147 BPM    QRS Duration 94 ms    Q-T Interval 346 ms    QTC Calculation(Bazett) 519 ms    R Axis -41 degrees    T Axis -54 degrees    Diagnosis Line Atrial fibrillation with rapid ventricular response  Left axis deviation  Minimal voltage criteria for LVH, may be normal variant  Nonspecific ST and T wave abnormality  Abnormal ECG    < end of copied text >      Echo:  < from: TTE Echo Complete w/o Contrast w/ Doppler (01.06.21 @ 16:57) >  OBSERVATIONS:  Technically difficult study  Mitral Valve: Thickened leaflets, moderate MR.  Aortic Valve/Aorta: Sclerotic trileaflet aortic valve with normal opening. Mild AI  Tricuspid Valve: normal with trace TR.  Pulmonic Valve: Mild PI  Left Atrium: Enlarged  Right Atrium: Not well-visualized  Left Ventricle: Low-normal left ventricular systolic function, estimated LVEF of 50%.  Right Ventricle: Grossly normal size and systolic function.  Pericardium/Pleura: normal, no significant pericardial effusion.  Pulmonary/RV Pressure: estimated PA systolic pressure of 29 mmHg      Conclusion:  Technically difficult study  Low-normal left ventricular systolic function, estimated LVEF of 50%.  Grossly normal RV size and systolic function.  Left atrial enlargement  Sclerotic trileaflet aortic valve, mild AI.  Moderate MR  Mild TR.      < end of copied text >      Radiology:

## 2021-01-22 NOTE — PROGRESS NOTE ADULT - SUBJECTIVE AND OBJECTIVE BOX
[facility]  INFECTIOUS DISEASES   [address] [place] [address]  Tel: [phone]     Fax: [phone]  =======================================================    CARITO CONSTANTINO [id number]    Follow up: COVID and colitis     On O2 with NRB now, worsening of her respiratory distress.   No fever. Cultures negative.     Allergies:  No Known Allergies    Antibiotics:    cefepime   IVPB 1000 milliGRAM(s) IV Intermittent every 24 hours    metroNIDAZOLE  IVPB 500 milliGRAM(s) IV Intermittent every 8 hours    REVIEW OF SYSTEMS:  CONSTITUTIONAL:  No Fever or chills  HEENT:   No diplopia or blurred vision.  No earache, sore throat or runny nose.  CARDIOVASCULAR:  No chest pain or SOB  RESPIRATORY:  No cough, shortness of breath, PND or orthopnea.  GASTROINTESTINAL:  No nausea, vomiting or diarrhea.  GENITOURINARY:  No dysuria, frequency or urgency. No Blood in urine  MUSCULOSKELETAL:  no joint aches, no muscle pain  SKIN:  No change in skin, hair or nails.  NEUROLOGIC:  No paresthesias or weakness.  PSYCHIATRIC:  No disorder of thought or mood.  ENDOCRINE:  No heat or cold intolerance, polyuria or polydipsia.  HEMATOLOGICAL:  No easy bruising or bleeding.      Physical Exam:  Vital Signs Last 24 Hrs  T(C): 37.2 (22 Jan 2021 11:37), Max: 37.8 (22 Jan 2021 11:35)  T(F): 99 (22 Jan 2021 11:37), Max: 100.1 (22 Jan 2021 11:35)  HR: 84 (22 Jan 2021 11:37) (70 - 119)  BP: 137/86 (22 Jan 2021 11:37) (137/86 - 155/82)  BP(mean): --  RR: 21 (22 Jan 2021 11:37) (19 - 21)  SpO2: 98% (22 Jan 2021 11:37) (89% - 98%)  GEN: mild respiratory distress on NRB   HEENT: normocephalic and atraumatic.   NECK: Supple.  No lymphadenopathy   LUNGS: Scattered crackles bilaterally   HEART: Regular rate and rhythm   ABDOMEN: Soft, nontender, and nondistended.  Positive bowel sounds.    : No CVA tenderness  EXTREMITIES: Without edema.  MSK: no joint swelling  NEUROLOGIC: grossly intact.  PSYCHIATRIC: Appropriate affect .  SKIN: No rash      Labs:                        8.8    15.33 )-----------( 223      ( 21 Jan 2021 06:43 )             28.0      01-21    149<H>  |  122<H>  |  51<H>  ----------------------------<  117<H>  3.5   |  19<L>  |  1.40<H>    Ca    8.1<L>      21 Jan 2021 06:43    TPro  5.8<L>  /  Alb  2.3<L>  /  TBili  0.6  /  DBili  x   /  AST  32  /  ALT  23  /  AlkPhos  79  01-21    Culture - Blood (collected 01-16-21 @ 05:01)  Source: .Blood Blood-Peripheral  Final Report (01-21-21 @ 06:00):    No Growth Final    Culture - Blood (collected 01-16-21 @ 05:01)  Source: .Blood Blood-Peripheral  Final Report (01-21-21 @ 06:00):    No Growth Final    WBC Count: 15.33 K/uL (01-21-21 @ 06:43)  WBC Count: 16.47 K/uL (01-20-21 @ 09:28)  WBC Count: 18.49 K/uL (01-19-21 @ 10:21)  WBC Count: 23.13 K/uL (01-18-21 @ 06:24)    Creatinine, Serum: 1.40 mg/dL (01-21-21 @ 06:43)  Creatinine, Serum: 1.50 mg/dL (01-20-21 @ 09:28)  Creatinine, Serum: 1.80 mg/dL (01-19-21 @ 10:21)  Creatinine, Serum: 2.40 mg/dL (01-18-21 @ 06:24)    C-Reactive Protein, Serum: 1.63 mg/dL (01-19-21 @ 16:47)  C-Reactive Protein, Serum: 6.22 mg/dL (01-16-21 @ 14:08)    Ferritin, Serum: 656 ng/mL (01-19-21 @ 16:34)  Ferritin, Serum: 414 ng/mL (01-16-21 @ 14:11)    Procalcitonin, Serum: 1.02 ng/mL (01-16-21 @ 09:34)    COVID-19 IgG Antibody Index: <0.10 Index (01-16-21 @ 13:31)  COVID-19 IgG Antibody Interpretation: Negative (01-16-21 @ 13:31)  COVID-19 PCR: Detected (01-16-21 @ 00:38)  COVID-19 PCR: NotDetec (01-06-21 @ 06:01)  COVID-19 IgG Antibody Index: 0.06 Index (01-05-21 @ 22:41)  COVID-19 IgG Antibody Interpretation: Negative (01-05-21 @ 22:41)  COVID-19 PCR: NotDetec (01-05-21 @ 12:42)    All imaging and data are reviewed.   < from: CT Abdomen and Pelvis No Cont (01.16.21 @ 03:03) >  IMPRESSION:  Marked interval improvement in multifocal pneumonia.  Emphysema.  Compared to the recent CT of January 5, 2021, interval development of colitis of the sigmoid colon and rectum, of infectious or inflammatory etiology.  Redemonstration of gallstones.    Assessment and Plan:   84yo female, with PMH afib (on Coumadin), CVA and h/o aneurysm repair, infrarenal AAA, HTN, HLD, mitral valve prolapse, PVD of RLE s/p stents x3, rheumatoid arthritis, s/p hysterectomy, and L cataract, BIBEMS from [facility] for hypoxia, fever and lethargy. Pt was found to be satting 87% on RA and temperature of 101 F as per facility documentation. Pt tested positive for COVID on 1/12/21. The pt is s/p recent admission to the [facility] 1/5-1/8, was treated for PNA - received Zithromax x 3 days, completed a course of IV Zosyn, discharged home on Augmentin x 7 days. The had a low grade fever of 100.4 yesterday night, on supplemental O2 via NC, lethargic, WBC 11.27 on admission, decreased slightly now, + BRIDGET, with normal lactate, + elevated D-dimer 777, with normal liver enzymes, COVID 19 test is positive. CT head - no change from the study on the previous recent admission.   On admission the pt complained of diffuse abdominal pain, CT chest/abd/pelvis without contrast was performed - marked improvement in multifocal pneumonia, + emphysema, + colititis of sigmoid colon and rectum of inflammatory or infectious etiology, + gallstones that were seen on previous admission, HIDA scan was done then and negative. As per RN, the pt did not have any bowel movements overnight or in the morning.  On exam, the pt is lethargic, on supplemental O2 via NC, able to answer some questions appropriately but drifts into sleep in the middle of the sentence, pt's abdomen is diffusely tender upon palpation especially in the left lower quadrant. The pt was started on antimicrobial therapy on admission with Zosyn IV, remdesivir held secondary to BRIDGET, pt is on Decadron. +    Patient complains of heel pain, exam without local inflammatory signs, warm/well perfused appearing. Postop changes. Does not appear to have limb ischemia. Chest exam with diminished breath sounds, SaO2 NC 93%. . Abdominal exam with significant tenderness LLQ > suprapubic area >> elsewhere. No rebound. + guarding. Deforming arthritis c/w known RA.  Patient denies diarrhea prior to admission or here in the hospital, states passed flatus. At risk for resistant daniele given recent zosyn, antibiotics altered. Need to remain vigilant for C. difficile. Patient w RA, no DMARDS/biologics known to place patient at increased risk for opportunistic pathogens. Patient with prior hx of mesenteric ischemia but pain not out of proportion to physical findings, rectum unsual location to have ischemia given duplicate blood supply. COVID certainly potentially associated with hyperocoagulable states. Merits decadron but will suppress abdominal exam. Remdesivir not shown to impact mortality, in setting of BRIDGET potentially harmful, would avoid.     1/17: no fevers, WBC increased - pt is on steroids, Cr increased - off remdesivir, Decadron continued, pt did not have a bowel movement as per RN and nursing assistant, remain vigilant for C. Diff if the pt will start having bowel movements.   1/18: No fever, leukocytosis worse but on steroid. Blood cultures are negative. Creat 2.4 stable since yesterday but baseline is lower.   1/20: No fever, doing better, no abdominal pain in exam.   1/22: On supplemental o2 with NRB, worsening of her condition. Abdomen exam is benign.     1. Sigmoid colitis - no bowel movements   2. COVID 19   3. Fever  4. Leukocytosis  5. BRIDGET    - continue cefepime - renally dosed  - continue Flagyl 500 mg q 8 horus  - Can treat for total 7-10 days.   - If develops diarrhea please send C diff and GI PCR  - Remdesivir was held due to CrCl<30  - complete Decadron x 10 days   - blood cultures NGTD  - monitor pt's temperatures  - monitor WBC  - monitor pt's renal function, improving   - supplemental O2 for O2 SAT >92%  - precautions as per protocol  - anticoagulation as per protocol  - GI follow up noted  - palliative care follow up noted     Will follow PRN.    Sindi Anthony MD  Division of infectious Diseases  Cell [phone] between 8am and 6pm  After 6pm and over the weekends please call ID service line at [phone].

## 2021-01-22 NOTE — PROGRESS NOTE ADULT - ASSESSMENT
BRIDGET: Prerenal azotemia, ATN  COVID +, Pneumonia  Colitis  Hypertension now with hypotension  Coagulopathy    Recheck am labs. Rx for COVID. Encourage PO intake as tolerated. ds as tolerated. Avoid nephrotoxic meds as possible.   Monitor BP trend. Will follow electrolytes and renal function trend.

## 2021-01-22 NOTE — CONSULT NOTE ADULT - PROVIDER SPECIALTY LIST ADULT
Cardiology
Critical Care
Palliative Care
Pulmonology
Infectious Disease
Gastroenterology
Heme/Onc
Nephrology

## 2021-01-23 NOTE — DIETITIAN INITIAL EVALUATION ADULT. - PROBLEM SELECTOR PLAN 5
Chronic    - Known h/o afib, was discharged on Coumadin 5 mg BID W/Sat and 2.5mg for the rest of the week; but pt was changed to Coumadin 4 mg QD, most recent INR on 1/12 was 1.3  . will bridge if inr subtherapetuic   - Takes diltiazem 240mg qD and metoprolol succinate 100mg qD at home, continue   - EKG: afib, HR: 105, no ST elevation or depression   - Most recent TTE on last admission-Low-normal left ventricular systolic function, estimated LVEF of 50%. Mild AI. Moderate MR. Mild TR.    - Monitor daily INR and dose Coumadin accordingly

## 2021-01-23 NOTE — DIETITIAN INITIAL EVALUATION ADULT. - OTHER INFO
84yo F, with PMH afib (on Coumadin), CVA and h/o aneurysm repair, infrarenal AAA, HTN, HLD, mitral valve prolapse, PVD of RLE s/p stents x3, rheumatoid arthritis, s/p hysterectomy, and L cataract, BIBEMS from [facility] for hypoxia, fever and lethargy. Pt was found to be satting 87% on RA and temperature of 101 F as per facility documentation. Pt tested positive for COVID on 1/12/21. Also being treated for colitis, sepsis and Afib RVR.   Unable to obtain any meaningful information from pt therefore spoke with daughter, Dixie.  Daughter questioned the purpose of my call,  indicating that this is probably the end of the road for the pt.  Hospice consult ordered.  Dtr thinks wt was ~100-105# but is not sure.   Pt's O2 needs escalated overnight and is now on NRB.

## 2021-01-23 NOTE — DIETITIAN NUTRITION RISK NOTIFICATION - TREATMENT: THE FOLLOWING DIET HAS BEEN RECOMMENDED
Diet, Regular:   DASH/TLC {Sodium & Cholesterol Restricted} (01-16-21 @ 06:01) [Active]      Ensure BID, Ice cream per daughter request

## 2021-01-23 NOTE — PROGRESS NOTE ADULT - SUBJECTIVE AND OBJECTIVE BOX
Resting    Vital Signs Last 24 Hrs  T(C): 36.4 (01-23-21 @ 10:57), Max: 36.8 (01-23-21 @ 04:48)  T(F): 97.5 (01-23-21 @ 10:57), Max: 98.2 (01-23-21 @ 04:48)  HR: 68 (01-23-21 @ 17:23) (68 - 90)  BP: 135/80 (01-23-21 @ 17:23) (135/80 - 168/105)  RR: 18 (01-23-21 @ 10:57) (17 - 18)  SpO2: 98% (01-23-21 @ 12:57) (91% - 98%)    Respiratory: b/l air entry  Cardiovascular: S1 S2  Gastrointestinal: soft, ND  Extremities:  no edema                        9.3    20.51 )-----------( 260      ( 23 Jan 2021 07:29 )             29.6     23 Jan 2021 07:29    150    |  121    |  43     ----------------------------<  112    4.0     |  22     |  1.10     Ca    8.6        23 Jan 2021 07:29    TPro  6.2    /  Alb  2.3    /  TBili  0.9    /  DBili  x      /  AST  35     /  ALT  22     /  AlkPhos  91     23 Jan 2021 07:29    LIVER FUNCTIONS - ( 23 Jan 2021 07:29 )  Alb: 2.3 g/dL / Pro: 6.2 g/dL / ALK PHOS: 91 U/L / ALT: 22 U/L / AST: 35 U/L / GGT: x           PT/INR - ( 23 Jan 2021 07:29 )   PT: 20.6 sec;   INR: 1.81 ratio      acetaminophen   Tablet .. 650 milliGRAM(s) Oral every 6 hours PRN  ALBUTerol    90 MICROgram(s) HFA Inhaler 2 Puff(s) Inhalation every 3 hours PRN  ascorbic acid 500 milliGRAM(s) Oral daily  aspirin  chewable 81 milliGRAM(s) Oral daily  atorvastatin 40 milliGRAM(s) Oral at bedtime  budesonide  80 MICROgram(s)/formoterol 4.5 MICROgram(s) Inhaler 2 Puff(s) Inhalation two times a day  dexAMETHasone  Injectable 6 milliGRAM(s) IV Push daily  dextrose 5%. 1000 milliLiter(s) IV Continuous <Continuous>  diltiazem    milliGRAM(s) Oral daily  gabapentin 100 milliGRAM(s) Oral three times a day  guaiFENesin   Syrup  (Sugar-Free) 200 milliGRAM(s) Oral every 6 hours PRN  iron sucrose Injectable 100 milliGRAM(s) IV Push every 24 hours  lactobacillus acidophilus 1 Tablet(s) Oral three times a day  metoprolol tartrate 12.5 milliGRAM(s) Oral every 6 hours  metroNIDAZOLE    Tablet 500 milliGRAM(s) Oral every 8 hours  multivitamin 1 Tablet(s) Oral daily  pantoprazole    Tablet 40 milliGRAM(s) Oral every 12 hours  sodium chloride 0.65% Nasal 1 Spray(s) Both Nostrils three times a day  tiotropium 18 MICROgram(s) Capsule 1 Capsule(s) Inhalation daily    Assessment and Plan:   	  COVID +, Pneumonia  Colitis  Pre-renal BRIDGET improved  Hypernatremia  D5W IV  No nephrotoxins  [facility] in am    [phone]

## 2021-01-23 NOTE — PROGRESS NOTE ADULT - SUBJECTIVE AND OBJECTIVE BOX
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    Osteoarthritis    Afib    PVD (peripheral vascular disease)    Neuropathy  (Right lower leg)    Gout    H/O cerebral aneurysm repair  brain clips    CVA (cerebral vascular accident)  (&quot;Mini-stroke&quot;,1990&#x27;s)    Rheumatoid arthritis    Asthma    Hyperlipidemia    Hypertension    S/P cataract surgery  (Left eye)    Elective surgery  (&quot;Twisted bowel&quot;, 2014)    Elective surgery  (Exision of cyst on liver, 1985)    S/P ORIF (open reduction internal fixation) fracture  Left hip fx (2012) &amp; R hip fx (2013)    H/O cerebral aneurysm repair  Brain clips (1978(    Renal stone  Cysto stent placement 10/1/2014    PVD (peripheral vascular disease)  s/p RLE bypass x 3, most recent 3/2012 Right external iliac to PT bypass w/ PTFE (2012)    S/P FRED (total abdominal hysterectomy)  (1987, Hx of &quot;ovarian cancer?&quot;)          Medication list         MEDICATIONS  (STANDING):  ascorbic acid 500 milliGRAM(s) Oral daily  aspirin enteric coated 81 milliGRAM(s) Oral daily  atorvastatin 40 milliGRAM(s) Oral at bedtime  budesonide  80 MICROgram(s)/formoterol 4.5 MICROgram(s) Inhaler 2 Puff(s) Inhalation two times a day  cefepime   IVPB      cefepime   IVPB 1000 milliGRAM(s) IV Intermittent every 24 hours  dexAMETHasone  Injectable 6 milliGRAM(s) IV Push daily  diltiazem    milliGRAM(s) Oral daily  gabapentin 100 milliGRAM(s) Oral three times a day  iron sucrose Injectable 100 milliGRAM(s) IV Push every 24 hours  lactobacillus acidophilus 1 Tablet(s) Oral three times a day  metoprolol tartrate 12.5 milliGRAM(s) Oral every 6 hours  metroNIDAZOLE    Tablet 500 milliGRAM(s) Oral every 8 hours  multivitamin 1 Tablet(s) Oral daily  pantoprazole    Tablet 40 milliGRAM(s) Oral every 12 hours  sodium chloride 0.65% Nasal 1 Spray(s) Both Nostrils three times a day  tiotropium 18 MICROgram(s) Capsule 1 Capsule(s) Inhalation daily    MEDICATIONS  (PRN):  acetaminophen   Tablet .. 650 milliGRAM(s) Oral every 6 hours PRN Temp greater or equal to 38C (100.4F), Mild Pain (1 - 3)  ALBUTerol    90 MICROgram(s) HFA Inhaler 2 Puff(s) Inhalation every 3 hours PRN Shortness of Breath and/or Wheezing  guaiFENesin   Syrup  (Sugar-Free) 200 milliGRAM(s) Oral every 6 hours PRN Cough         Vitals log        ICU Vital Signs Last 24 Hrs  T(C): 36.4 (23 Jan 2021 05:05), Max: 37.8 (22 Jan 2021 11:35)  T(F): 97.5 (23 Jan 2021 05:05), Max: 100.1 (22 Jan 2021 11:35)  HR: 90 (23 Jan 2021 05:05) (82 - 90)  BP: 168/105 (23 Jan 2021 05:05) (137/86 - 168/105)  BP(mean): --  ABP: --  ABP(mean): --  RR: 18 (23 Jan 2021 05:05) (17 - 21)  SpO2: 91% (23 Jan 2021 05:05) (91% - 98%)           Input and Output:  I&O's Detail    22 Jan 2021 07:01  -  23 Jan 2021 07:00  --------------------------------------------------------  IN:    IV PiggyBack: 50 mL    Oral Fluid: 630 mL  Total IN: 680 mL    OUT:    Indwelling Catheter - Urethral (mL): 750 mL    Voided (mL): 275 mL  Total OUT: 1025 mL    Total NET: -345 mL          Lab Data                        9.3    20.51 )-----------( 260      ( 23 Jan 2021 07:29 )             29.6     01-23    x   |  x   |  43<H>  ----------------------------<  112<H>  x    |  22  |  1.10    Ca    8.6      23 Jan 2021 07:29    TPro  x   /  Alb  2.3<L>  /  TBili  x   /  DBili  x   /  AST  35  /  ALT  22  /  AlkPhos  x   01-23    ABG - ( 22 Jan 2021 08:51 )  pH, Arterial: 7.46  pH, Blood: x     /  pCO2: 25    /  pO2: 113   / HCO3: 20    / Base Excess: -5.6  /  SaO2: 98                      Review of Systems	      Objective     Physical Examination    heart s1s2  lung dec BS  abd soft      Pertinent Lab findings & Imaging      Fransico:  NO   Adequate UO     I&O's Detail    22 Jan 2021 07:01  -  23 Jan 2021 07:00  --------------------------------------------------------  IN:    IV PiggyBack: 50 mL    Oral Fluid: 630 mL  Total IN: 680 mL    OUT:    Indwelling Catheter - Urethral (mL): 750 mL    Voided (mL): 275 mL  Total OUT: 1025 mL    Total NET: -345 mL               Discussed with:     Cultures:	        Radiology

## 2021-01-23 NOTE — DIETITIAN INITIAL EVALUATION ADULT. - PROBLEM SELECTOR PLAN 2
c/o diffuse abdominal pain   lactate: 1.0, WBC: 11.27, HR: 103 in the ED   - CT Abdomen/Pelvis: interval development of colitis of the sigmoid colon and rectum, of infectious or inflammatory etiology. Redemonstration of gallstones from last admission -->HIDA during last admission was negative ruling out acute cholecystitis   - Tylenol prn for pain and fever   - Trend daily CBC and temperature curve  - Received Tylenol, Zosyn, NS 1L bolus x1 in the ED  - C/w Zosyn  - F/u UCx x1, BCx x2  - Gastroenterology Dr. Lassiter consulted, f/u recs

## 2021-01-23 NOTE — DIETITIAN INITIAL EVALUATION ADULT. - PROBLEM SELECTOR PLAN 8
Chronic-history of peripheral arterial disease s/p 2 stents placed in RLE  - CTA Abdomen/Pelvis on last admission with evidence of occluded R femoral bypass graft  - Daughter, Dixie, reports patient has known occlusion of femoral graft  - Outpatient follow up for an angiogram

## 2021-01-23 NOTE — DIETITIAN INITIAL EVALUATION ADULT. - PROBLEM SELECTOR PLAN 9
- Known h/o fusiform infrarenal AAA without significant change last evaluated during 1/2020 admission by vascular surgery with no acute intervention at that time  - Recommended outpatient follow up.

## 2021-01-23 NOTE — PHYSICAL THERAPY INITIAL EVALUATION ADULT - PERTINENT HX OF CURRENT PROBLEM, REHAB EVAL
86 yo F BIBEMS from [facility] for hypoxia, fever and lethargy. Pt COVID+ on 1/12/21.  pt complained of diffuse abdominal pain, CT chest/abd/pelvis marked improvement in multifocal pneumonia. Recent admission to the [facility] 1/5-1/8 for PNA. Admitted for acute hypoxic resp failure, COVID19 PNA, colitis, sepsis and Afib RVR

## 2021-01-23 NOTE — PHARMACOTHERAPY INTERVENTION NOTE - COMMENTS
Patient currently ordered for cefepime 1g Q24h for sigmoid colitis. CrCl today is 34.2 mL/min. Discussed with ID Dr. Anthony, recommended renally dose adjusting to cefepime 1g q12h, accepted.
Patient ordered IV Flagyl 500mg Q8h and Cefepime 1g Q24h for sigmoid colitis. Spoke with ID Dr. Anthony to recommend PO Flagyl since patient is tolerating other PO medications. MD agreed. Additionally, discussed duration with MD, MD would like total of 10 days of antibiotics for now. Will continue on Cefepime 1g Q24h and PO Flagyl 500mg Q8h until 1/25/21.
Patient currently ordered for warfarin 2.5 mg one-time dose tonight for Afib. INR today is 1.53. On admission, patient received 2.5 mg one-time dose and following INRs were supratherapeutic. Patient takes warfarin 4 mg daily outpatient. Discussed INR with Dr. Milvia Taylor, recommended starting warfarin at a lower dose of 1 mg. MD would like to continue with warfarin 2.5 mg one-time dose since INR today is subtherapeutic.

## 2021-01-23 NOTE — PHYSICAL THERAPY INITIAL EVALUATION ADULT - ADDITIONAL COMMENTS
Patient lives alone in an apartment and has HHA who comes 7-5pm daily.  Patient reports she was able to ambulate with RW independently and performs most ADLs independently.  Patient requires assistance with bathing and some aspects of dressing.  Pt was at [facility] prior to this admission

## 2021-01-23 NOTE — DIETITIAN INITIAL EVALUATION ADULT. - PROBLEM SELECTOR PLAN 6
Chronic   - Stable  - Takes diltiazem 240mg qD, and metoprolol succinate 100mg qD at home, continue   - Monitor routine hemodynamics

## 2021-01-23 NOTE — PROGRESS NOTE ADULT - SUBJECTIVE AND OBJECTIVE BOX
Patient is a 85y old  Female who presents with a chief complaint of AMS, lethargy (21 Jan 2021 16:48)    ----  INTERVAL HPI/OVERNIGHT EVENTS: Pt seen and evaluated at the bedside. overnight no further events of desaturation , patient remains on  NR. Discussed  with nurse. stable, patient has no complains. 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  Osteoarthritis  Afib  PVD (peripheral vascular disease)  Neuropathy  (Right lower leg)  H/O cerebral aneurysm repair  brain clips  CVA (cerebral vascular accident)  Rheumatoid arthritis  Hyperlipidemia  Hypertension    Surgical hx  S/P cataract surgery  (Left eye)    Elective surgery  (&quot;Twisted bowel&quot;, 2014)    Elective surgery  (Exision of cyst on liver, 1985)    S/P ORIF (open reduction internal fixation) fracture  Left hip fx (2012) &amp; R hip fx (2013)    H/O cerebral aneurysm repair  Brain clips (1978(    Renal stone  Cysto stent placement 10/1/2014    PVD (peripheral vascular disease)  s/p RLE bypass x 3, most recent 3/2012 Right external iliac to PT bypass w/ PTFE (2012)    S/P FRED (total abdominal hysterectomy)  (1987, Hx of ovarian cancer?)      FAMILY HISTORY:  No pertinent family history in first degree relatives     Allergies; No Known Allergies    Intolerances        ----  MEDICATIONS  (STANDING):  ascorbic acid 500 milliGRAM(s) Oral daily  aspirin enteric coated 81 milliGRAM(s) Oral daily  atorvastatin 40 milliGRAM(s) Oral at bedtime  budesonide  80 MICROgram(s)/formoterol 4.5 MICROgram(s) Inhaler 2 Puff(s) Inhalation two times a day  cefepime   IVPB      cefepime   IVPB 1000 milliGRAM(s) IV Intermittent every 24 hours  dexAMETHasone  Injectable 6 milliGRAM(s) IV Push daily  diltiazem    milliGRAM(s) Oral daily  gabapentin 100 milliGRAM(s) Oral three times a day  lactobacillus acidophilus 1 Tablet(s) Oral three times a day  metoprolol tartrate 12.5 milliGRAM(s) Oral every 6 hours  metroNIDAZOLE    Tablet 500 milliGRAM(s) Oral every 8 hours  multivitamin 1 Tablet(s) Oral daily  pantoprazole    Tablet 40 milliGRAM(s) Oral every 12 hours  sodium chloride 0.65% Nasal 1 Spray(s) Both Nostrils three times a day  tiotropium 18 MICROgram(s) Capsule 1 Capsule(s) Inhalation daily    MEDICATIONS  (PRN):  acetaminophen   Tablet .. 650 milliGRAM(s) Oral every 6 hours PRN Temp greater or equal to 38C (100.4F), Mild Pain (1 - 3)  ALBUTerol    90 MICROgram(s) HFA Inhaler 2 Puff(s) Inhalation every 3 hours PRN Shortness of Breath and/or Wheezing  guaiFENesin   Syrup  (Sugar-Free) 200 milliGRAM(s) Oral every 6 hours PRN Cough      ----  REVIEW OF SYSTEMS:  SKIN: denies  rash  CARDIOVASCULAR: denies chest pain, chest pressure, palpitations  RESPIRATORY: denies shortness of breath, sputum production  GASTROINTESTINAL: denies nausea, vomiting, diarrhea, abdominal pain  NEUROLOGICAL: denies numbness, headache, focal weakness  MUSCULOSKELETAL: denies new joint pain, muscle aches  HEMATOLOGIC: denies gross bleeding, bruising  PSYCHIATRIC: denies recent changes in anxiety, depression  ENDOCRINOLOGIC: denies sweating, cold or heat intolerance    ----  PHYSICAL EXAM:  GENERAL: patient appears ill, no acute distress, NR  EYES: sclera clear, no exudates  ENT: moist mucous membranes  NECK: supple, soft, no thyromegaly noted  LUNGS:  clear to auscultation but decrease breath sounds, no wheezing or rhonchi, no labored breathing  HEART: soft S1/S2, regular rate and rhythm, no murmurs noted, no noted edema to b/l LE  GASTROINTESTINAL: abdomen is soft, nontender, nondistended, normoactive bowel sounds, no palpable masses  INTEGUMENT: poor skin turgor, no visualized rashes, no jaundice noted  MUSCULOSKELETAL: no clubbing or cyanosis, no obvious deformity  NEUROLOGIC: awake, alert, no focal signs    T(C): 36.8 (01-21-21 @ 11:40), Max: 36.8 (01-20-21 @ 20:52)  HR: 70 (01-21-21 @ 16:41) (66 - 79)  BP: 155/82 (01-21-21 @ 16:41) (139/65 - 165/80)  RR: 15 (01-21-21 @ 11:40) (15 - 17)  SpO2: 93% (01-21-21 @ 11:40) (90% - 93%)  Wt(kg): --    ----  I&O's Summary    20 Jan 2021 07:01  -  21 Jan 2021 07:00  --------------------------------------------------------  IN: 0 mL / OUT: 750 mL / NET: -750 mL        LABS:                                   9.3    20.51 )-----------( 260      ( 23 Jan 2021 07:29 )             29.6                8.8    15.33 )-----------( 223      ( 21 Jan 2021 06:43 )             28.0   01-23    150<H>  |  121<H>  |  43<H>  ----------------------------<  112<H>  4.0   |  22  |  1.10    Ca    8.6      23 Jan 2021 07:29    TPro  6.2  /  Alb  2.3<L>  /  TBili  0.9  /  DBili  x   /  AST  35  /  ALT  22  /  AlkPhos  91  01-23 01-21    149<H>  |  122<H>  |  51<H>  ----------------------------<  117<H>  3.5   |  19<L>  |  1.40<H>    Ca    8.1<L>      21 Jan 2021 06:43    TPro  5.8<L>  /  Alb  2.3<L>  /  TBili  0.6  /  DBili  x   /  AST  32  /  ALT  23  /  AlkPhos  79  01-21    PT/INR - ( 20 Jan 2021 09:28 )   PT: 19.6 sec;   INR: 1.72 ratio             CAPILLARY BLOOD GLUCOSE

## 2021-01-23 NOTE — PROGRESS NOTE ADULT - ASSESSMENT
85F, SNF, HTN, HL, Afib/coumadin, hx CVA/aneurysm repair, infrarenal AAA, PVD/RLE stents, RA; admitted for acute hypoxic resp failure, COVID19 PNA, colitis, sepsis and Afib RVR    Sepsis   -In setting of COVID-19 PNA, colitis  -HR normalized, persistent leukocytosis (on steroids)   -Continue on IV abxs to complete 7 days as per ID, pulmonary regimen  -f/u cxs : NGT    COVID19 PNA complicated with  hypoxic resp failure and sepsis   -Remdesivir held 2/2 renal failure  -Continue on dexamethasone - plan for 10d course  -Continued suppl O2 support   -Continue airborne/contact precautions  -Continue trending inflammatory markers as clinically necessary  -Pulm/ID following    Colitis  - assoc w/abd pain   -Started on IV abxs: zosyn and downgraded to cefepime and flagyl to complete 7 days as per ID  -monitoring for diarrhea - plan for check Cdiff if +diarrhea  -tolerating reg diet  -GI following     BRIDGET   -likely multifactorial :Prerenal azotemia, ATN  -Cr 2.4 (1/18)->1.8 (1/19) ->1.5 (1/20)--->1.10    -renally dose meds, avoid nephrotoxics  -Bateman in place for accurate I/O monitoring , if continues downtrending Cr -plan for Bateman removal   -Nephrology following     Leukocytosis  -Afebrile, BCx negative  -On steroids treatment  -continue trending     Hypernatremia  -Na 149-->150  -nephrology following,  - will start D5W @ 75, continue monitoring    Afib/RVR  -In setting of acute infection  -No evidence of acute ischemia, no volume overload  -remains rate controlled on metoprolol, warfarin restarted, INR subtherapeutic  -Cardiology following , appreciate recommendations  -Continue monitoring lytes    Supratherap INR/coumadin toxicity  -INR elev 7.01-->1.7 after single dose of Vit K, now 1.81 after restarting warfarin  -Continue trending INR    Anemia  -baseline 10-11, likely multifactorial  -iron studies ordered, decrease  -venofer ordered     spoke Daughter Dixie @[phone] and gave an update about patient's condition

## 2021-01-23 NOTE — DIETITIAN INITIAL EVALUATION ADULT. - WEIGHT (LBS)
105 Eucrisa Counseling: Patient may experience a mild burning sensation during topical application. Eucrisa is not approved in children less than 2 years of age.

## 2021-01-23 NOTE — PROGRESS NOTE ADULT - ASSESSMENT
85f well known to our office and sees Dr. Laughlin.  She has afib (on Coumadin), GIB, CVA and h/o intracranial aneurysm repair, infrarenal AAA, HTN, HLD, mitral valve prolapse, PVD of RLE with failed bypass, failed redo bypass and failed multiple stents, with persistent occlusion.  She has rheumatoid arthritis, s/p hysterectomy, and L cataract.  She is known to have a borderline lvef of 50 by echo done 1 week ago when she was hosp with pna and an acute abd process.  At that time she was more rapid af given that she was not taking her meds, and her meds were adjusted to include metop 12.5 q6 and dilt 240.  Metop was changed to xl 100 at [facility] from [facility] for hypoxia, fever and lethargy. She has been treated for COVID pna/resp failure and colitis. She is being treated with decadron for COVID, but her BRIDGET precludes her from receiving remdesivir.  GI/ID following for colitis and recently switched to cefepime and flagyl.  She has had worsened issues with hypoxia and hypotension, and both icu and cardiology were consulted this am.      Afib with RVR  - not on tele   - No evidence of acute ischemia or volume overload  - Continue BB and CCB  - Continue Coumadin. INR normalized s/p Vit K   - TTE showed low-normal LVSF, EF 50%, normal RVF, with moderate MR.  No need to repeat unless change in cardiac status    Covid Pna  - Supplemental O2 as needed, now on NRB  - Supportive care  - Continue current management  - Follow ID and Pulm recs    Hx of CVA  - Continue ASA and statin    BRIDGET/ hypernatremia  encourage po fluids  renal following    - Monitor and replete lytes, keep K>4, Mg>2.  - All other medical needs as per primary team.  - Other cardiovascular workup will depend on clinical course.  - Will continue to follow.  Renita LANDONP-C  Cardiology NP  [facility] [phone]

## 2021-01-23 NOTE — PROGRESS NOTE ADULT - SUBJECTIVE AND OBJECTIVE BOX
INTERVAL HPI/OVERNIGHT EVENTS:  chart reviewed, on nc, interim events noted  no diarrhea, no gib per nursing  remains on NRB      MEDICATIONS  (STANDING):  ascorbic acid 500 milliGRAM(s) Oral daily  aspirin enteric coated 81 milliGRAM(s) Oral daily  atorvastatin 40 milliGRAM(s) Oral at bedtime  budesonide  80 MICROgram(s)/formoterol 4.5 MICROgram(s) Inhaler 2 Puff(s) Inhalation two times a day  cefepime   IVPB      cefepime   IVPB 1000 milliGRAM(s) IV Intermittent every 24 hours  dexAMETHasone  Injectable 6 milliGRAM(s) IV Push daily  diltiazem    milliGRAM(s) Oral daily  gabapentin 100 milliGRAM(s) Oral three times a day  lactobacillus acidophilus 1 Tablet(s) Oral three times a day  metoprolol tartrate 12.5 milliGRAM(s) Oral every 6 hours  metroNIDAZOLE    Tablet 500 milliGRAM(s) Oral every 8 hours  multivitamin 1 Tablet(s) Oral daily  pantoprazole    Tablet 40 milliGRAM(s) Oral every 12 hours  sodium chloride 0.65% Nasal 1 Spray(s) Both Nostrils three times a day  tiotropium 18 MICROgram(s) Capsule 1 Capsule(s) Inhalation daily    MEDICATIONS  (PRN):  acetaminophen   Tablet .. 650 milliGRAM(s) Oral every 6 hours PRN Temp greater or equal to 38C (100.4F), Mild Pain (1 - 3)  ALBUTerol    90 MICROgram(s) HFA Inhaler 2 Puff(s) Inhalation every 3 hours PRN Shortness of Breath and/or Wheezing  guaiFENesin   Syrup  (Sugar-Free) 200 milliGRAM(s) Oral every 6 hours PRN Cough      Allergies    No Known Allergies    Intolerances        Review of Systems:  tele f/u    Vital Signs Last 24 Hrs  T(C): 36.4 (23 Jan 2021 05:05), Max: 37.8 (22 Jan 2021 11:35)  T(F): 97.5 (23 Jan 2021 05:05), Max: 100.1 (22 Jan 2021 11:35)  HR: 90 (23 Jan 2021 05:05) (82 - 90)  BP: 168/105 (23 Jan 2021 05:05) (137/86 - 168/105)  BP(mean): --  RR: 18 (23 Jan 2021 05:05) (17 - 21)  SpO2: 91% (23 Jan 2021 05:05) (91% - 98%)      PHYSICAL EXAM:  tele f/u            LABS:                        9.3    20.51 )-----------( 260      ( 23 Jan 2021 07:29 )             29.6     01-23    150<H>  |  121<H>  |  43<H>  ----------------------------<  112<H>  4.0   |  22  |  1.10    Ca    8.6      23 Jan 2021 07:29    TPro  6.2  /  Alb  2.3<L>  /  TBili  0.9  /  DBili  x   /  AST  35  /  ALT  22  /  AlkPhos  91  01-23    PT/INR - ( 23 Jan 2021 07:29 )   PT: 20.6 sec;   INR: 1.81 ratio         PTT - ( 23 Jan 2021 07:29 )  PTT:31.2 sec      01-22-21 @ 07:01 - 01-23-21 @ 07:00  --------------------------------------------------------  IN: 680 mL / OUT: 1025 mL / NET: -345 mL    01-23-21 @ 07:01 - 01-23-21 @ 10:58  --------------------------------------------------------  IN: 75 mL / OUT: 0 mL / NET: 75 mL          RADIOLOGY & ADDITIONAL TESTS:

## 2021-01-23 NOTE — PROGRESS NOTE ADULT - SUBJECTIVE AND OBJECTIVE BOX
[facility]  INFECTIOUS DISEASES   [address] [place] [address]  Tel: [phone]     Fax: [phone]  =======================================================    CARITO CONSTANTINO [id number]    Follow up: COVID and colitis     Patient seen and examined. On O2 with NRB, more awake today.   No fever.     Allergies:  No Known Allergies    Antibiotics:    cefepime   IVPB 1000 milliGRAM(s) IV Intermittent every 24 hours    metroNIDAZOLE  IVPB 500 milliGRAM(s) IV Intermittent every 8 hours    REVIEW OF SYSTEMS:  CONSTITUTIONAL:  No Fever or chills  HEENT:   No diplopia or blurred vision.  No earache, sore throat or runny nose.  CARDIOVASCULAR:  No chest pain or SOB  RESPIRATORY:  No cough, shortness of breath, PND or orthopnea.  GASTROINTESTINAL:  No nausea, vomiting or diarrhea.  GENITOURINARY:  No dysuria, frequency or urgency. No Blood in urine  MUSCULOSKELETAL:  no joint aches, no muscle pain  SKIN:  No change in skin, hair or nails.  NEUROLOGIC:  No paresthesias or weakness.  PSYCHIATRIC:  No disorder of thought or mood.  ENDOCRINE:  No heat or cold intolerance, polyuria or polydipsia.  HEMATOLOGICAL:  No easy bruising or bleeding.      Physical Exam:  Vital Signs Last 24 Hrs  T(C): 36.4 (23 Jan 2021 10:57), Max: 36.8 (23 Jan 2021 04:48)  T(F): 97.5 (23 Jan 2021 10:57), Max: 98.2 (23 Jan 2021 04:48)  HR: 68 (23 Jan 2021 17:23) (68 - 90)  BP: 135/80 (23 Jan 2021 17:23) (135/80 - 168/105)  BP(mean): --  RR: 18 (23 Jan 2021 10:57) (17 - 18)  SpO2: 98% (23 Jan 2021 12:57) (91% - 98%)  GEN: mild respiratory distress on NRB   HEENT: normocephalic and atraumatic.   NECK: Supple.  No lymphadenopathy   LUNGS: Scattered crackles bilaterally   HEART: Regular rate and rhythm   ABDOMEN: Soft, nontender, and nondistended.  Positive bowel sounds.    : No CVA tenderness  EXTREMITIES: Without edema.  MSK: no joint swelling  NEUROLOGIC: grossly intact.  PSYCHIATRIC: Appropriate affect .  SKIN: No rash      Labs:                        9.3    20.51 )-----------( 260      ( 23 Jan 2021 07:29 )             29.6      01-23    150<H>  |  121<H>  |  43<H>  ----------------------------<  112<H>  4.0   |  22  |  1.10    Ca    8.6      23 Jan 2021 07:29    TPro  6.2  /  Alb  2.3<L>  /  TBili  0.9  /  DBili  x   /  AST  35  /  ALT  22  /  AlkPhos  91  01-23      Culture - Blood (collected 01-16-21 @ 05:01)  Source: .Blood Blood-Peripheral  Final Report (01-21-21 @ 06:00):    No Growth Final    Culture - Blood (collected 01-16-21 @ 05:01)  Source: .Blood Blood-Peripheral  Final Report (01-21-21 @ 06:00):    No Growth Final    WBC Count: 20.51 K/uL (01-23-21 @ 07:29)  WBC Count: 15.33 K/uL (01-21-21 @ 06:43)  WBC Count: 16.47 K/uL (01-20-21 @ 09:28)  WBC Count: 18.49 K/uL (01-19-21 @ 10:21)    Creatinine, Serum: 1.10 mg/dL (01-23-21 @ 07:29)  Creatinine, Serum: 1.40 mg/dL (01-21-21 @ 06:43)  Creatinine, Serum: 1.50 mg/dL (01-20-21 @ 09:28)  Creatinine, Serum: 1.80 mg/dL (01-19-21 @ 10:21)    C-Reactive Protein, Serum: 1.63 mg/dL (01-19-21 @ 16:47)  C-Reactive Protein, Serum: 6.22 mg/dL (01-16-21 @ 14:08)    Ferritin, Serum: 656 ng/mL (01-19-21 @ 16:34)  Ferritin, Serum: 414 ng/mL (01-16-21 @ 14:11)    Procalcitonin, Serum: 1.02 ng/mL (01-16-21 @ 09:34)     COVID-19 IgG Antibody Index: <0.10 Index (01-16-21 @ 13:31)  COVID-19 IgG Antibody Interpretation: Negative (01-16-21 @ 13:31)  COVID-19 PCR: Detected (01-16-21 @ 00:38)  COVID-19 PCR: NotDetec (01-06-21 @ 06:01)  COVID-19 IgG Antibody Index: 0.06 Index (01-05-21 @ 22:41)  COVID-19 IgG Antibody Interpretation: Negative (01-05-21 @ 22:41)  COVID-19 PCR: NotDetec (01-05-21 @ 12:42)      All imaging and data are reviewed.   < from: CT Abdomen and Pelvis No Cont (01.16.21 @ 03:03) >  IMPRESSION:  Marked interval improvement in multifocal pneumonia.  Emphysema.  Compared to the recent CT of January 5, 2021, interval development of colitis of the sigmoid colon and rectum, of infectious or inflammatory etiology.  Redemonstration of gallstones.    Assessment and Plan:   86yo female, with PMH afib (on Coumadin), CVA and h/o aneurysm repair, infrarenal AAA, HTN, HLD, mitral valve prolapse, PVD of RLE s/p stents x3, rheumatoid arthritis, s/p hysterectomy, and L cataract, [facility]IBEMS from [facility] for hypoxia, fever and lethargy. Pt was found to be satting 87% on RA and temperature of 101 F as per facility documentation. Pt tested positive for COVID on 1/12/21. The pt is s/p recent admission to the [facility] 1/5-1/8, was treated for PNA - received Zithromax x 3 days, completed a course of IV Zosyn, discharged home on Augmentin x 7 days. The had a low grade fever of 100.4 yesterday night, on supplemental O2 via NC, lethargic, WBC 11.27 on admission, decreased slightly now, + BRIDGET, with normal lactate, + elevated D-dimer 777, with normal liver enzymes, COVID 19 test is positive. CT head - no change from the study on the previous recent admission.   On admission the pt complained of diffuse abdominal pain, CT chest/abd/pelvis without contrast was performed - marked improvement in multifocal pneumonia, + emphysema, + colititis of sigmoid colon and rectum of inflammatory or infectious etiology, + gallstones that were seen on previous admission, HIDA scan was done then and negative. As per RN, the pt did not have any bowel movements overnight or in the morning.  On exam, the pt is lethargic, on supplemental O2 via NC, able to answer some questions appropriately but drifts into sleep in the middle of the sentence, pt's abdomen is diffusely tender upon palpation especially in the left lower quadrant. The pt was started on antimicrobial therapy on admission with Zosyn IV, remdesivir held secondary to BRIDGET, pt is on Decadron. +    Patient complains of heel pain, exam without local inflammatory signs, warm/well perfused appearing. Postop changes. Does not appear to have limb ischemia. Chest exam with diminished breath sounds, SaO2 NC 93%. . Abdominal exam with significant tenderness LLQ > suprapubic area >> elsewhere. No rebound. + guarding. Deforming arthritis c/w known RA.  Patient denies diarrhea prior to admission or here in the hospital, states passed flatus. At risk for resistant daniele given recent zosyn, antibiotics altered. Need to remain vigilant for C. difficile. Patient w RA, no DMARDS/biologics known to place patient at increased risk for opportunistic pathogens. Patient with prior hx of mesenteric ischemia but pain not out of proportion to physical findings, rectum unsual location to have ischemia given duplicate blood supply. COVID certainly potentially associated with hyperocoagulable states. Merits decadron but will suppress abdominal exam. Remdesivir not shown to impact mortality, in setting of BRIDGET potentially harmful, would avoid.     1/17: no fevers, WBC increased - pt is on steroids, Cr increased - off remdesivir, Decadron continued, pt did not have a bowel movement as per RN and nursing assistant, remain vigilant for C. Diff if the pt will start having bowel movements.   1/18: No fever, leukocytosis worse but on steroid. Blood cultures are negative. Creat 2.4 stable since yesterday but baseline is lower.   1/20: No fever, doing better, no abdominal pain in exam.   1/22: On supplemental o2 with NRB, worsening of her condition. Abdomen exam is benign.     1. Sigmoid colitis - no bowel movements   2. COVID 19   3. Fever  4. Leukocytosis  5. BRIDGET    - continue cefepime - creat has improved so dose has been increased   - continue Flagyl 500 mg q 8 horus  - Can treat for total 7-10 days.   - If develops diarrhea please send C diff and GI PCR  - Remdesivir was held due to CrCl<30  - complete Decadron x 10 days   - blood cultures NGTD  - monitor pt's temperatures  - monitor WBC  - monitor pt's renal function, improving   - supplemental O2 for O2 SAT >92%  - precautions as per protocol  - anticoagulation as per protocol  - GI follow up noted  - palliative care follow up noted     Will follow PRN.    Sindi Anthony MD  Division of infectious Diseases  Cell [phone] between 8am and 6pm  After 6pm and over the weekends please call ID service line at [phone].

## 2021-01-23 NOTE — PROGRESS NOTE ADULT - SUBJECTIVE AND OBJECTIVE BOX
[facility] -- Dahiana Bañuelos, Evens, Toney, Truman Falcon Savella  Office # [phone]      Follow Up:    afib, cva, htn hld     Subjective/Observations:   Seen at bedside, remains on NRB with mild tachypnea  unable to provide any meaningful information   REVIEW OF SYSTEMS: unable to assess     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    Osteoarthritis    Afib    PVD (peripheral vascular disease)    Neuropathy  (Right lower leg)    H/O cerebral aneurysm repair  brain clips    CVA (cerebral vascular accident)  (&quot;Mini-stroke&quot;,1990&#x27;s)    Rheumatoid arthritis    Hyperlipidemia    Hypertension    S/P cataract surgery  (Left eye)    Elective surgery  (&quot;Twisted bowel&quot;, 2014)    Elective surgery  (Exision of cyst on liver, 1985)    S/P ORIF (open reduction internal fixation) fracture  Left hip fx (2012) &amp; R hip fx (2013)    H/O cerebral aneurysm repair  Brain clips (1978(    Renal stone  Cysto stent placement 10/1/2014    PVD (peripheral vascular disease)  s/p RLE bypass x 3, most recent 3/2012 Right external iliac to PT bypass w/ PTFE (2012)    S/P FRED (total abdominal hysterectomy)  (1987, Hx of &quot;ovarian cancer?&quot;)        MEDICATIONS  (STANDING):  ascorbic acid 500 milliGRAM(s) Oral daily  aspirin enteric coated 81 milliGRAM(s) Oral daily  atorvastatin 40 milliGRAM(s) Oral at bedtime  budesonide  80 MICROgram(s)/formoterol 4.5 MICROgram(s) Inhaler 2 Puff(s) Inhalation two times a day  cefepime   IVPB      cefepime   IVPB 1000 milliGRAM(s) IV Intermittent every 24 hours  dexAMETHasone  Injectable 6 milliGRAM(s) IV Push daily  diltiazem    milliGRAM(s) Oral daily  gabapentin 100 milliGRAM(s) Oral three times a day  iron sucrose Injectable 100 milliGRAM(s) IV Push every 24 hours  lactobacillus acidophilus 1 Tablet(s) Oral three times a day  metoprolol tartrate 12.5 milliGRAM(s) Oral every 6 hours  metroNIDAZOLE    Tablet 500 milliGRAM(s) Oral every 8 hours  multivitamin 1 Tablet(s) Oral daily  pantoprazole    Tablet 40 milliGRAM(s) Oral every 12 hours  sodium chloride 0.65% Nasal 1 Spray(s) Both Nostrils three times a day  tiotropium 18 MICROgram(s) Capsule 1 Capsule(s) Inhalation daily    MEDICATIONS  (PRN):  acetaminophen   Tablet .. 650 milliGRAM(s) Oral every 6 hours PRN Temp greater or equal to 38C (100.4F), Mild Pain (1 - 3)  ALBUTerol    90 MICROgram(s) HFA Inhaler 2 Puff(s) Inhalation every 3 hours PRN Shortness of Breath and/or Wheezing  guaiFENesin   Syrup  (Sugar-Free) 200 milliGRAM(s) Oral every 6 hours PRN Cough      Allergies    No Known Allergies    Intolerances        Vital Signs Last 24 Hrs  T(C): 36.4 (23 Jan 2021 05:05), Max: 37.8 (22 Jan 2021 11:35)  T(F): 97.5 (23 Jan 2021 05:05), Max: 100.1 (22 Jan 2021 11:35)  HR: 90 (23 Jan 2021 05:05) (82 - 90)  BP: 168/105 (23 Jan 2021 05:05) (137/86 - 168/105)  BP(mean): --  RR: 18 (23 Jan 2021 05:05) (17 - 21)  SpO2: 91% (23 Jan 2021 05:05) (91% - 98%)    I&O's Summary    22 Jan 2021 07:01  -  23 Jan 2021 07:00  --------------------------------------------------------  IN: 680 mL / OUT: 1025 mL / NET: -345 mL          PHYSICAL EXAM:  TELE: not on tele   Constitutional: frail   HEENT: Moist Mucous Membranes, Anicteric  Pulmonary: mild tachypnea, breath sounds are clear bilaterally, No wheezing, crackles or rhonchi  Cardiovascular: Regular, S1 and S2 nl, No murmurs, rubs, gallops or clicks  Gastrointestinal: Bowel Sounds present, soft, nontender.   Lymph: No lymphadenopathy. No peripheral edema.  Skin: No visible rashes or ulcers.  Psych:  Mood & affect appropriate    LABS: All Labs Reviewed:                        9.3    20.51 )-----------( 260      ( 23 Jan 2021 07:29 )             29.6                         8.8    15.33 )-----------( 223      ( 21 Jan 2021 06:43 )             28.0                         9.2    16.47 )-----------( 203      ( 20 Jan 2021 09:28 )             29.4     23 Jan 2021 07:29    150    |  121    |  43     ----------------------------<  112    4.0     |  22     |  1.10   21 Jan 2021 06:43    149    |  122    |  51     ----------------------------<  117    3.5     |  19     |  1.40   20 Jan 2021 09:28    148    |  120    |  54     ----------------------------<  116    3.6     |  18     |  1.50     Ca    8.6        23 Jan 2021 07:29  Ca    8.1        21 Jan 2021 06:43  Ca    8.4        20 Jan 2021 09:28    TPro  6.2    /  Alb  2.3    /  TBili  0.9    /  DBili  x      /  AST  35     /  ALT  22     /  AlkPhos  91     23 Jan 2021 07:29  TPro  5.8    /  Alb  2.3    /  TBili  0.6    /  DBili  x      /  AST  32     /  ALT  23     /  AlkPhos  79     21 Jan 2021 06:43    PT/INR - ( 23 Jan 2021 07:29 )   PT: 20.6 sec;   INR: 1.81 ratio         PTT - ( 23 Jan 2021 07:29 )  PTT:31.2 sec         ECG:  < from: 12 Lead ECG (01.16.21 @ 01:08) >    Ventricular Rate 106 BPM    Atrial Rate 79 BPM    QRS Duration 104 ms    Q-T Interval 374 ms    QTC Calculation(Bazett) 496 ms    R Axis -47 degrees    T Axis 57 degrees    Diagnosis Line Atrial fibrillation with rapid ventricular response  Left axis deviation  Septal infarct , age undetermined  Abnormal ECG  When compared with ECG of 05-JAN-2021 12:06,  Septal infarct is now present    < end of copied text >    Echo:  < from: TTE Echo Complete w/o Contrast w/ Doppler (01.06.21 @ 16:57) >  OBSERVATIONS:  Technically difficult study  Mitral Valve: Thickened leaflets, moderate MR.  Aortic Valve/Aorta: Sclerotic trileaflet aortic valve with normal opening. Mild AI  Tricuspid Valve: normal with trace TR.  Pulmonic Valve: Mild PI  Left Atrium: Enlarged  Right Atrium: Not well-visualized  Left Ventricle: Low-normal left ventricular systolic function, estimated LVEF of 50%.  Right Ventricle: Grossly normal size and systolic function.  Pericardium/Pleura: normal, no significant pericardial effusion.  Pulmonary/RV Pressure: estimated PA systolic pressure of 29 mmHg      Conclusion:  Technically difficult study  Low-normal left ventricular systolic function, estimated LVEF of 50%.  Grossly normal RV size and systolic function.  Left atrial enlargement  Sclerotic trileaflet aortic valve, mild AI.  Moderate MR  Mild TR.      Radiology:  < from: CT Chest No Cont (01.16.21 @ 03:02) >    IMPRESSION:    Marked interval improvement in multifocal pneumonia.  Emphysema.    Compared to the recent CT of January 5, 2021, interval development of colitis of the sigmoid colon and rectum, of infectious or inflammatory etiology.    Redemonstration of gallstones.    < end of copied text >

## 2021-01-23 NOTE — DIETITIAN INITIAL EVALUATION ADULT. - PROBLEM SELECTOR PLAN 1
Acute hypoxic respiratory failure 2/2 confirmed COVID-19 infection  - Admit w/ remote telemetry monitoring  - Patient currently saturating well on 3L NC  - Supplemental O2 PRN to maintain O2 sats >88%, escalate therapy as appropriate. Prone PRN  - Monitor volume status closely, will exercise caution with aggressive hydration  - Will start decadron 6 mg x10 days   - Will hold off on remdesivir due to kidney function--f/u AM GFR and can start remdesivir if GFR>30 - pt at approx day 14 of symptoms including symptoms leading up to prior admission.  - Tylenol prn myalgias, fever  - Avoid nebs. continue with MDI prn.  - QTc: 496, prolonged   - Daily labs to include: CBC with differential along with a CMP  - Ferritin, CRP, d-dimer, procal at admission then will repeat as clinically warranted  - Will initiate VTE ppx unless absolute contraindication- Pt is on Coumadin due to afib . if am INR subtherapeutic will bridge with heparin gtt  - Advanced care planning/code status: Full code, Palliative consult   - ID Dr. Anthony consulted, f/u recs

## 2021-01-23 NOTE — DIETITIAN INITIAL EVALUATION ADULT. - ORAL INTAKE PTA/DIET HISTORY
Pt has a history of continued reduced oral intake and weight loss.  Has prior malnutrition dx.  Lives at home with an aide per daughter.  Does not think she is consuming Ensure shakes anymore.  Had been taking 1-2 a day about a year ago.  Still having food delivered via [employer] service.

## 2021-01-23 NOTE — DIETITIAN INITIAL EVALUATION ADULT. - PROBLEM SELECTOR PLAN 3
- fredi on CKD stage 3   - likely 2/2 poor po intake in the setting of infection , covid/colitis  - s/p 1L NS bolus. continune with gentle fluids x 12 hours. avoid aggressive resuscitation given covid +  f/u repeat bmp  - hold nephrotoxic meds

## 2021-01-24 NOTE — PROGRESS NOTE ADULT - NUTRITIONAL ASSESSMENT
This patient has been assessed with a concern for Malnutrition and has been determined to have a diagnosis/diagnoses of Severe protein-calorie malnutrition.    This patient is being managed with:   Diet DASH/TLC-  Sodium & Cholesterol Restricted  Supplement Feeding Modality:  Oral  Ensure Enlive Cans or Servings Per Day:  1       Frequency:  Two Times a day  Entered: Jan 23 2021 10:44AM    
This patient has been assessed with a concern for Malnutrition and has been determined to have a diagnosis/diagnoses of Severe protein-calorie malnutrition.    This patient is being managed with:   Diet Regular-  DASH/TLC {Sodium & Cholesterol Restricted}  Entered: Jan 16 2021  6:01AM    The following pending diet order is being considered for treatment of Severe protein-calorie malnutrition:  Diet DASH/TLC-  Sodium & Cholesterol Restricted  Supplement Feeding Modality:  Oral  Ensure Enlive Cans or Servings Per Day:  1       Frequency:  Two Times a day  Entered: Jan 23 2021 10:44AM

## 2021-01-24 NOTE — PROGRESS NOTE ADULT - SUBJECTIVE AND OBJECTIVE BOX
Date/Time Patient Seen:  		  Referring MD:   Data Reviewed	       Patient is a 85y old  Female who presents with a chief complaint of AMS, lethargy (23 Jan 2021 19:33)      Subjective/HPI     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    Osteoarthritis    Afib    PVD (peripheral vascular disease)    Neuropathy  (Right lower leg)    Gout    H/O cerebral aneurysm repair  brain clips    CVA (cerebral vascular accident)  (&quot;Mini-stroke&quot;,1990&#x27;s)    Rheumatoid arthritis    Asthma    Hyperlipidemia    Hypertension    S/P cataract surgery  (Left eye)    Elective surgery  (&quot;Twisted bowel&quot;, 2014)    Elective surgery  (Exision of cyst on liver, 1985)    S/P ORIF (open reduction internal fixation) fracture  Left hip fx (2012) &amp; R hip fx (2013)    H/O cerebral aneurysm repair  Brain clips (1978(    Renal stone  Cysto stent placement 10/1/2014    PVD (peripheral vascular disease)  s/p RLE bypass x 3, most recent 3/2012 Right external iliac to PT bypass w/ PTFE (2012)    S/P FRED (total abdominal hysterectomy)  (1987, Hx of &quot;ovarian cancer?&quot;)          Medication list         MEDICATIONS  (STANDING):  ascorbic acid 500 milliGRAM(s) Oral daily  aspirin  chewable 81 milliGRAM(s) Oral daily  atorvastatin 40 milliGRAM(s) Oral at bedtime  budesonide  80 MICROgram(s)/formoterol 4.5 MICROgram(s) Inhaler 2 Puff(s) Inhalation two times a day  cefepime   IVPB 1000 milliGRAM(s) IV Intermittent every 12 hours  dexAMETHasone  Injectable 6 milliGRAM(s) IV Push daily  dextrose 5%. 1000 milliLiter(s) (75 mL/Hr) IV Continuous <Continuous>  diltiazem    milliGRAM(s) Oral daily  gabapentin 100 milliGRAM(s) Oral three times a day  iron sucrose Injectable 100 milliGRAM(s) IV Push every 24 hours  lactobacillus acidophilus 1 Tablet(s) Oral three times a day  metoprolol tartrate 12.5 milliGRAM(s) Oral every 6 hours  metroNIDAZOLE    Tablet 500 milliGRAM(s) Oral every 8 hours  multivitamin 1 Tablet(s) Oral daily  pantoprazole    Tablet 40 milliGRAM(s) Oral every 12 hours  sodium chloride 0.65% Nasal 1 Spray(s) Both Nostrils three times a day  tiotropium 18 MICROgram(s) Capsule 1 Capsule(s) Inhalation daily    MEDICATIONS  (PRN):  acetaminophen   Tablet .. 650 milliGRAM(s) Oral every 6 hours PRN Temp greater or equal to 38C (100.4F), Mild Pain (1 - 3)  ALBUTerol    90 MICROgram(s) HFA Inhaler 2 Puff(s) Inhalation every 3 hours PRN Shortness of Breath and/or Wheezing  guaiFENesin   Syrup  (Sugar-Free) 200 milliGRAM(s) Oral every 6 hours PRN Cough         Vitals log        ICU Vital Signs Last 24 Hrs  T(C): 36.3 (23 Jan 2021 22:12), Max: 36.4 (23 Jan 2021 10:57)  T(F): 97.3 (23 Jan 2021 22:12), Max: 97.5 (23 Jan 2021 10:57)  HR: 68 (23 Jan 2021 17:23) (68 - 77)  BP: 156/85 (23 Jan 2021 22:12) (135/80 - 156/85)  BP(mean): --  ABP: --  ABP(mean): --  RR: 18 (23 Jan 2021 22:12) (18 - 18)  SpO2: 99% (23 Jan 2021 22:12) (98% - 99%)           Input and Output:  I&O's Detail    22 Jan 2021 07:01  -  23 Jan 2021 07:00  --------------------------------------------------------  IN:    IV PiggyBack: 50 mL    Oral Fluid: 630 mL  Total IN: 680 mL    OUT:    Indwelling Catheter - Urethral (mL): 750 mL    Voided (mL): 275 mL  Total OUT: 1025 mL    Total NET: -345 mL      23 Jan 2021 07:01  -  24 Jan 2021 06:12  --------------------------------------------------------  IN:    dextrose 5%: 225 mL    dextrose 5%: 225 mL    IV PiggyBack: 50 mL  Total IN: 500 mL    OUT:    Indwelling Catheter - Urethral (mL): 300 mL    Voided (mL): 285 mL  Total OUT: 585 mL    Total NET: -85 mL          Lab Data                        9.3    20.51 )-----------( 260      ( 23 Jan 2021 07:29 )             29.6     01-23    150<H>  |  121<H>  |  43<H>  ----------------------------<  112<H>  4.0   |  22  |  1.10    Ca    8.6      23 Jan 2021 07:29    TPro  6.2  /  Alb  2.3<L>  /  TBili  0.9  /  DBili  x   /  AST  35  /  ALT  22  /  AlkPhos  91  01-23    ABG - ( 22 Jan 2021 08:51 )  pH, Arterial: 7.46  pH, Blood: x     /  pCO2: 25    /  pO2: 113   / HCO3: 20    / Base Excess: -5.6  /  SaO2: 98                      Review of Systems	      Objective     Physical Examination    on high fio2 support  heart s1s2  lung dec BS  abd soft      Pertinent Lab findings & Imaging      Fransico:  NO   Adequate UO     I&O's Detail    22 Jan 2021 07:01  -  23 Jan 2021 07:00  --------------------------------------------------------  IN:    IV PiggyBack: 50 mL    Oral Fluid: 630 mL  Total IN: 680 mL    OUT:    Indwelling Catheter - Urethral (mL): 750 mL    Voided (mL): 275 mL  Total OUT: 1025 mL    Total NET: -345 mL      23 Jan 2021 07:01  -  24 Jan 2021 06:12  --------------------------------------------------------  IN:    dextrose 5%: 225 mL    dextrose 5%: 225 mL    IV PiggyBack: 50 mL  Total IN: 500 mL    OUT:    Indwelling Catheter - Urethral (mL): 300 mL    Voided (mL): 285 mL  Total OUT: 585 mL    Total NET: -85 mL               Discussed with:     Cultures:	        Radiology

## 2021-01-24 NOTE — PROGRESS NOTE ADULT - SUBJECTIVE AND OBJECTIVE BOX
INTERVAL HPI/OVERNIGHT EVENTS:  chart reviewed,  interim events noted  pt offers no GI complaints   remains on NRB      MEDICATIONS  (STANDING):  ascorbic acid 500 milliGRAM(s) Oral daily  aspirin enteric coated 81 milliGRAM(s) Oral daily  atorvastatin 40 milliGRAM(s) Oral at bedtime  budesonide  80 MICROgram(s)/formoterol 4.5 MICROgram(s) Inhaler 2 Puff(s) Inhalation two times a day  cefepime   IVPB      cefepime   IVPB 1000 milliGRAM(s) IV Intermittent every 24 hours  dexAMETHasone  Injectable 6 milliGRAM(s) IV Push daily  diltiazem    milliGRAM(s) Oral daily  gabapentin 100 milliGRAM(s) Oral three times a day  lactobacillus acidophilus 1 Tablet(s) Oral three times a day  metoprolol tartrate 12.5 milliGRAM(s) Oral every 6 hours  metroNIDAZOLE    Tablet 500 milliGRAM(s) Oral every 8 hours  multivitamin 1 Tablet(s) Oral daily  pantoprazole    Tablet 40 milliGRAM(s) Oral every 12 hours  sodium chloride 0.65% Nasal 1 Spray(s) Both Nostrils three times a day  tiotropium 18 MICROgram(s) Capsule 1 Capsule(s) Inhalation daily    MEDICATIONS  (PRN):  acetaminophen   Tablet .. 650 milliGRAM(s) Oral every 6 hours PRN Temp greater or equal to 38C (100.4F), Mild Pain (1 - 3)  ALBUTerol    90 MICROgram(s) HFA Inhaler 2 Puff(s) Inhalation every 3 hours PRN Shortness of Breath and/or Wheezing  guaiFENesin   Syrup  (Sugar-Free) 200 milliGRAM(s) Oral every 6 hours PRN Cough      Allergies    No Known Allergies    Intolerances        Review of Systems:  tele f/u      Vital Signs Last 24 Hrs  T(C): 36.4 (24 Jan 2021 06:18), Max: 36.4 (23 Jan 2021 10:57)  T(F): 97.5 (24 Jan 2021 06:18), Max: 97.5 (23 Jan 2021 10:57)  HR: 73 (24 Jan 2021 06:18) (68 - 77)  BP: 151/87 (24 Jan 2021 06:18) (135/80 - 156/85)  BP(mean): --  RR: 18 (24 Jan 2021 06:18) (18 - 18)  SpO2: 97% (24 Jan 2021 06:18) (97% - 99%)          PHYSICAL EXAM:  tele f/u              LABS:                        8.8    20.76 )-----------( 245      ( 24 Jan 2021 07:17 )             27.9     01-24    146<H>  |  117<H>  |  43<H>  ----------------------------<  129<H>  4.1   |  22  |  1.10    Ca    8.3<L>      24 Jan 2021 07:17  Mg     2.0     01-24    TPro  6.0  /  Alb  2.1<L>  /  TBili  0.8  /  DBili  x   /  AST  26  /  ALT  20  /  AlkPhos  93  01-24    PT/INR - ( 23 Jan 2021 07:29 )   PT: 20.6 sec;   INR: 1.81 ratio         PTT - ( 23 Jan 2021 07:29 )  PTT:31.2 sec      01-23-21 @ 07:01  -  01-24-21 @ 07:00  --------------------------------------------------------  IN: 500 mL / OUT: 585 mL / NET: -85 mL      RADIOLOGY & ADDITIONAL TESTS:

## 2021-01-24 NOTE — PROGRESS NOTE ADULT - ASSESSMENT
85f well known to our office and sees Dr. Laughlin.  She has afib (on Coumadin), GIB, CVA and h/o intracranial aneurysm repair, infrarenal AAA, HTN, HLD, mitral valve prolapse, PVD of RLE with failed bypass, failed redo bypass and failed multiple stents, with persistent occlusion.  She has rheumatoid arthritis, s/p hysterectomy, and L cataract.  She is known to have a borderline lvef of 50 by echo done 1 week ago when she was hosp with pna and an acute abd process.  At that time she was more rapid af given that she was not taking her meds, and her meds were adjusted to include metop 12.5 q6 and dilt 240.  Metop was changed to xl 100 at [facility] from [employer] [facility] for hypoxia, fever and lethargy. She has been treated for COVID pna/resp failure and colitis. She is being treated with decadron for COVID, but her BRIDGET precludes her from receiving remdesivir.  GI/ID following for colitis and recently switched to cefepime and flagyl.  She has had worsened issues with hypoxia and hypotension, and both icu and cardiology were consulted this am.      Afib with RVR  - not on tele   - No evidence of acute ischemia or volume overload  - Continue BB and CCB  - Continue Coumadin. INR normalized s/p Vit K   - TTE showed low-normal LVSF, EF 50%, normal RVF, with moderate MR.  No need to repeat unless change in cardiac status    Covid Pna  -lethargic now on NRB  - Supportive care  - Continue current management  - Follow ID and Pulm recs    Hx of CVA  - Continue ASA and statin        GOC need to be addressed   - Monitor and replete lytes, keep K>4, Mg>2.  - All other medical needs as per primary team.  - Other cardiovascular workup will depend on clinical course.  - Will continue to follow.  Renita LANDONP-C  Cardiology NP  [facility] [id number] [phone]   85f well known to our office and sees Dr. Laughlin.  She has afib (on Coumadin), GIB, CVA and h/o intracranial aneurysm repair, infrarenal AAA, HTN, HLD, mitral valve prolapse, PVD of RLE with failed bypass, failed redo bypass and failed multiple stents, with persistent occlusion.  She has rheumatoid arthritis, s/p hysterectomy, and L cataract.  She is known to have a borderline lvef of 50 by echo done 1 week ago when she was hosp with pna and an acute abd process.  At that time she was more rapid af given that she was not taking her meds, and her meds were adjusted to include metop 12.5 q6 and dilt 240.  Metop was changed to xl 100 at [facility] from [facility] for hypoxia, fever and lethargy. She has been treated for COVID pna/resp failure and colitis. She is being treated with decadron for COVID, but her BRIDGET precludes her from receiving remdesivir.  GI/ID following for colitis and recently switched to cefepime and flagyl.      Afib with RVR  - not on tele   - No evidence of acute ischemia or volume overload  - Continue BB and CCB  - Continue Coumadin. INR normalized s/p Vit K   - TTE showed low-normal LVSF, EF 50%, normal RVF, with moderate MR.  No need to repeat unless change in cardiac status    Covid Pna  - lethargic now on NRB  - Supportive care  - Continue current management  - Follow ID and Pulm recs    Hx of CVA  - Continue ASA and statin    GOC need to be addressed     - Monitor and replete lytes, keep K>4, Mg>2.  - All other medical needs as per primary team.  - Other cardiovascular workup will depend on clinical course.  - Will continue to follow.  Renita Lira FNP-C  Cardiology NP  [facility] [address] [phone]   85f well known to our office and sees Dr. Laughlin.  She has afib (on Coumadin), GIB, CVA and h/o intracranial aneurysm repair, infrarenal AAA, HTN, HLD, mitral valve prolapse, PVD of RLE with failed bypass, failed redo bypass and failed multiple stents, with persistent occlusion.  She has rheumatoid arthritis, s/p hysterectomy, and L cataract.  She is known to have a borderline lvef of 50 by echo done 1 week ago when she was hosp with pna and an acute abd process.  At that time she was more rapid af given that she was not taking her meds, and her meds were adjusted to include metop 12.5 q6 and dilt 240.  Metop was changed to xl 100 at [facility] [employer] [facility] from [employer] for hypoxia, fever and lethargy. She has been treated for COVID pna/resp failure and colitis. She is being treated with decadron for COVID, but her BRIDGET precludes her from receiving remdesivir.  GI/ID following for colitis and recently switched to cefepime and flagyl.      Afib with RVR  - not on tele   - No evidence of acute ischemia or volume overload  - Continue BB and CCB  - Continue Coumadin. INR normalized s/p Vit K   - TTE showed low-normal LVSF, EF 50%, normal RVF, with moderate MR.  No need to repeat unless change in cardiac status    Covid Pna  - lethargic now on NRB  - Supportive care  - Continue current management  - Follow ID and Pulm recs    Hx of CVA  - Continue ASA and statin    - Monitor and replete lytes, keep K>4, Mg>2.  - All other medical needs as per primary team.  - Other cardiovascular workup will depend on clinical course.  - Will continue to follow.  Renita Lira FNP-C  Cardiology NP  [facility] [phone]

## 2021-01-24 NOTE — PROGRESS NOTE ADULT - SUBJECTIVE AND OBJECTIVE BOX
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    Osteoarthritis    Afib    PVD (peripheral vascular disease)    Neuropathy  (Right lower leg)    H/O cerebral aneurysm repair  brain clips    CVA (cerebral vascular accident)  (&quot;Mini-stroke&quot;,1990&#x27;s)    Rheumatoid arthritis    Hyperlipidemia    Hypertension    S/P cataract surgery  (Left eye)    Elective surgery  (&quot;Twisted bowel&quot;, 2014)    Elective surgery  (Exision of cyst on liver, 1985)    S/P ORIF (open reduction internal fixation) fracture  Left hip fx (2012) &amp; R hip fx (2013)    H/O cerebral aneurysm repair  Brain clips (1978(    Renal stone  Cysto stent placement 10/1/2014    PVD (peripheral vascular disease)  s/p RLE bypass x 3, most recent 3/2012 Right external iliac to PT bypass w/ PTFE (2012)    S/P FRED (total abdominal hysterectomy)  (1987, Hx of &quot;ovarian cancer?&quot;)        MEDICATIONS  (STANDING):  ascorbic acid 500 milliGRAM(s) Oral daily  aspirin  chewable 81 milliGRAM(s) Oral daily  atorvastatin 40 milliGRAM(s) Oral at bedtime  budesonide  80 MICROgram(s)/formoterol 4.5 MICROgram(s) Inhaler 2 Puff(s) Inhalation two times a day  cefepime   IVPB 1000 milliGRAM(s) IV Intermittent every 12 hours  dexAMETHasone  Injectable 6 milliGRAM(s) IV Push daily  dextrose 5%. 1000 milliLiter(s) (75 mL/Hr) IV Continuous <Continuous>  diltiazem    milliGRAM(s) Oral daily  gabapentin 100 milliGRAM(s) Oral three times a day  iron sucrose Injectable 100 milliGRAM(s) IV Push every 24 hours  lactobacillus acidophilus 1 Tablet(s) Oral three times a day  metoprolol tartrate 12.5 milliGRAM(s) Oral every 6 hours  metroNIDAZOLE    Tablet 500 milliGRAM(s) Oral every 8 hours  multivitamin 1 Tablet(s) Oral daily  pantoprazole    Tablet 40 milliGRAM(s) Oral every 12 hours  sodium chloride 0.65% Nasal 1 Spray(s) Both Nostrils three times a day  tiotropium 18 MICROgram(s) Capsule 1 Capsule(s) Inhalation daily    MEDICATIONS  (PRN):  acetaminophen   Tablet .. 650 milliGRAM(s) Oral every 6 hours PRN Temp greater or equal to 38C (100.4F), Mild Pain (1 - 3)  ALBUTerol    90 MICROgram(s) HFA Inhaler 2 Puff(s) Inhalation every 3 hours PRN Shortness of Breath and/or Wheezing  guaiFENesin   Syrup  (Sugar-Free) 200 milliGRAM(s) Oral every 6 hours PRN Cough      Allergies    No Known Allergies    Intolerances        Vital Signs Last 24 Hrs  T(C): 36.4 (24 Jan 2021 06:18), Max: 36.4 (23 Jan 2021 10:57)  T(F): 97.5 (24 Jan 2021 06:18), Max: 97.5 (23 Jan 2021 10:57)  HR: 73 (24 Jan 2021 06:18) (68 - 77)  BP: 151/87 (24 Jan 2021 06:18) (135/80 - 156/85)  BP(mean): --  RR: 18 (24 Jan 2021 06:18) (18 - 18)  SpO2: 97% (24 Jan 2021 06:18) (97% - 99%)    I&O's Summary    23 Jan 2021 07:01  -  24 Jan 2021 07:00  --------------------------------------------------------  IN: 500 mL / OUT: 585 mL / NET: -85 mL          PHYSICAL EXAM:  TELE: not on tele   Constitutional: lethargic, frail   HEENT: Moist Mucous Membranes, Anicteric  Pulmonary: mild tachypnea, course   Cardiovascular: Regular, S1 and S2 nl, + murmur   Gastrointestinal: Bowel Sounds present, soft, nontender.   Lymph: No lymphadenopathy. No peripheral edema.  Skin: No visible rashes or ulcers.  Psych:  Mood & affect appropriate    LABS: All Labs Reviewed:                        8.8    20.76 )-----------( 245      ( 24 Jan 2021 07:17 )             27.9                         9.3    20.51 )-----------( 260      ( 23 Jan 2021 07:29 )             29.6     24 Jan 2021 07:17    146    |  117    |  43     ----------------------------<  129    4.1     |  22     |  1.10   23 Jan 2021 07:29    150    |  121    |  43     ----------------------------<  112    4.0     |  22     |  1.10     Ca    8.3        24 Jan 2021 07:17  Ca    8.6        23 Jan 2021 07:29  Mg     2.0       24 Jan 2021 07:17    TPro  6.0    /  Alb  2.1    /  TBili  0.8    /  DBili  x      /  AST  26     /  ALT  20     /  AlkPhos  93     24 Jan 2021 07:17  TPro  6.2    /  Alb  2.3    /  TBili  0.9    /  DBili  x      /  AST  35     /  ALT  22     /  AlkPhos  91     23 Jan 2021 07:29    PT/INR - ( 23 Jan 2021 07:29 )   PT: 20.6 sec;   INR: 1.81 ratio         PTT - ( 23 Jan 2021 07:29 )  PTT:31.2 sec         ECG:< from: 12 Lead ECG (01.16.21 @ 01:08) >    Ventricular Rate 106 BPM    Atrial Rate 79 BPM    QRS Duration 104 ms    Q-T Interval 374 ms    QTC Calculation(Bazett) 496 ms    R Axis -47 degrees    T Axis 57 degrees    Diagnosis Line Atrial fibrillation with rapid ventricular response  Left axis deviation  Septal infarct , age undetermined  Abnormal ECG  When compared with ECG of 05-JAN-2021 12:06,  Septal infarct is now present  Confirmed by Uriel Ziegler MD    < end of copied text >      Echo:  < from: TTE Echo Complete w/o Contrast w/ Doppler (01.06.21 @ 16:57) >  PWT 0.8       Normal Values: 0.6 - 1.1 cm  LVIDd 4.8         Normal Values: 3.0 - 5.6 cm  LVIDs 3.3         Normal Values: 1.8 - 4.0 cm      OBSERVATIONS:  Technically difficult study  Mitral Valve: Thickened leaflets, moderate MR.  Aortic Valve/Aorta: Sclerotic trileaflet aortic valve with normal opening. Mild AI  Tricuspid Valve: normal with trace TR.  Pulmonic Valve: Mild PI  Left Atrium: Enlarged  Right Atrium: Not well-visualized  Left Ventricle: Low-normal left ventricular systolic function, estimated LVEF of 50%.  Right Ventricle: Grossly normal size and systolic function.  Pericardium/Pleura: normal, no significant pericardial effusion.  Pulmonary/RV Pressure: estimated PA systolic pressure of 29 mmHg      Conclusion:  Technically difficult study  Low-normal left ventricular systolic function, estimated LVEF of 50%.  Grossly normal RV size and systolic function.  Left atrial enlargement  Sclerotic trileaflet aortic valve, mild AI.  Moderate MR  Mild TR.      < end of copied text >      Radiology:

## 2021-01-24 NOTE — PROGRESS NOTE ADULT - ASSESSMENT
85F, SNF, HTN, HL, Afib/coumadin, hx CVA/aneurysm repair, infrarenal AAA, PVD/RLE stents, RA; admitted for acute hypoxic resp failure, COVID19 PNA, colitis, sepsis and Afib RVR    Sepsis   -In setting of COVID-19 PNA, colitis  -HR normalized, persistent leukocytosis (on steroids)   -Continue on IV abxs to complete 7 days as per ID, last day tomorrow 1/25/21  -continue pulmonary regimen  -f/u cxs : NGT    COVID19 PNA complicated with  hypoxic resp failure and sepsis   -Remdesivir held 2/2 renal failure  -Continue on dexamethasone - plan for 10d course  -Continued suppl O2 support NR  -Continue airborne/contact precautions  -Continue trending inflammatory markers as clinically necessary  -Pulm/ID following    Colitis  - assoc w/abd pain   -Started on IV abxs: zosyn and downgraded to cefepime and flagyl to complete 7 days as per ID, last day tomorrow  -monitoring for diarrhea - plan for check Cdiff if +diarrhea  -tolerating reg diet  -GI following     BRIDGET   -likely multifactorial :Prerenal azotemia, ATN  -Cr 2.4 (1/18)->1.8 (1/19) ->1.5 (1/20)--->1.10  -->1.10(1/24/21)  -renally dose meds, avoid nephrotoxics  -Bateman removed   -Nephrology following     Leukocytosis  -Afebrile, BCx negative  -On steroids treatment  -continue trending     Hypernatremia  -Na 149-->150-->146  -nephrology following,  - continue D5W @ 50, continue monitoring    Afib/RVR  -In setting of acute infection  -No evidence of acute ischemia, no volume overload  -remains rate controlled on metoprolol, warfarin restarted, INR subtherapeutic  -Cardiology following , appreciate recommendations  -Continue monitoring lytes    Supratherap INR/coumadin toxicity  -INR elev 7.01-->1.7 after single dose of Vit K, now 1.81 after restarting warfarin  -Continue trending INR    Anemia  -baseline 10-11, likely multifactorial  -iron studies ordered, decrease  -venofer ordered   -continue monitoring    spoke Daughter Dixie @[phone] and gave an update about patient's condition

## 2021-01-24 NOTE — PROGRESS NOTE ADULT - SUBJECTIVE AND OBJECTIVE BOX
[facility]  INFECTIOUS DISEASES   [address] [place] [address]  Tel: [phone]     Fax: [phone]  =======================================================    CARITO CONSTANTINO [id number]    Follow up: COVID and colitis     Patient seen and examined. On O2 with NRB, awake.  No fever.     Allergies:  No Known Allergies    Antibiotics:    cefepime   IVPB 1000 milliGRAM(s) IV Intermittent every 24 hours    metroNIDAZOLE  IVPB 500 milliGRAM(s) IV Intermittent every 8 hours    REVIEW OF SYSTEMS:  CONSTITUTIONAL:  No Fever or chills  HEENT:   No diplopia or blurred vision.  No earache, sore throat or runny nose.  CARDIOVASCULAR:  No chest pain or SOB  RESPIRATORY:  No cough, shortness of breath, PND or orthopnea.  GASTROINTESTINAL:  No nausea, vomiting or diarrhea.  GENITOURINARY:  No dysuria, frequency or urgency. No Blood in urine  MUSCULOSKELETAL:  no joint aches, no muscle pain  SKIN:  No change in skin, hair or nails.  NEUROLOGIC:  No paresthesias or weakness.  PSYCHIATRIC:  No disorder of thought or mood.  ENDOCRINE:  No heat or cold intolerance, polyuria or polydipsia.  HEMATOLOGICAL:  No easy bruising or bleeding.      Physical Exam:  Vital Signs Last 24 Hrs  T(C): 36.4 (24 Jan 2021 06:18), Max: 36.4 (23 Jan 2021 10:57)  T(F): 97.5 (24 Jan 2021 06:18), Max: 97.5 (23 Jan 2021 10:57)  HR: 73 (24 Jan 2021 06:18) (68 - 77)  BP: 151/87 (24 Jan 2021 06:18) (135/80 - 156/85)  BP(mean): --  RR: 18 (24 Jan 2021 06:18) (18 - 18)  SpO2: 97% (24 Jan 2021 06:18) (97% - 99%)  GEN: mild respiratory distress on NRB   HEENT: normocephalic and atraumatic.   NECK: Supple.  No lymphadenopathy   LUNGS: Scattered crackles bilaterally   HEART: Regular rate and rhythm   ABDOMEN: Soft, nontender, and nondistended.  Positive bowel sounds.    : No CVA tenderness  EXTREMITIES: Without edema.  MSK: no joint swelling  NEUROLOGIC: grossly intact.  PSYCHIATRIC: Appropriate affect .  SKIN: No rash    Labs:                        8.8    20.76 )-----------( 245      ( 24 Jan 2021 07:17 )             27.9      01-24    146<H>  |  117<H>  |  43<H>  ----------------------------<  129<H>  4.1   |  22  |  1.10    Ca    8.3<L>      24 Jan 2021 07:17  Mg     2.0     01-24    TPro  6.0  /  Alb  2.1<L>  /  TBili  0.8  /  DBili  x   /  AST  26  /  ALT  20  /  AlkPhos  93  01-24    Culture - Blood (collected 01-16-21 @ 05:01)  Source: .Blood Blood-Peripheral  Final Report (01-21-21 @ 06:00):    No Growth Final    Culture - Blood (collected 01-16-21 @ 05:01)  Source: .Blood Blood-Peripheral  Final Report (01-21-21 @ 06:00):    No Growth Final    WBC Count: 20.76 K/uL (01-24-21 @ 07:17)  WBC Count: 20.51 K/uL (01-23-21 @ 07:29)  WBC Count: 15.33 K/uL (01-21-21 @ 06:43)  WBC Count: 16.47 K/uL (01-20-21 @ 09:28)  WBC Count: 18.49 K/uL (01-19-21 @ 10:21)    Creatinine, Serum: 1.10 mg/dL (01-24-21 @ 07:17)  Creatinine, Serum: 1.10 mg/dL (01-23-21 @ 07:29)  Creatinine, Serum: 1.40 mg/dL (01-21-21 @ 06:43)  Creatinine, Serum: 1.50 mg/dL (01-20-21 @ 09:28)  Creatinine, Serum: 1.80 mg/dL (01-19-21 @ 10:21)    C-Reactive Protein, Serum: 1.63 mg/dL (01-19-21 @ 16:47)  C-Reactive Protein, Serum: 6.22 mg/dL (01-16-21 @ 14:08)    Ferritin, Serum: 656 ng/mL (01-19-21 @ 16:34)  Ferritin, Serum: 414 ng/mL (01-16-21 @ 14:11)    Procalcitonin, Serum: 1.02 ng/mL (01-16-21 @ 09:34)     COVID-19 IgG Antibody Index: <0.10 Index (01-16-21 @ 13:31)  COVID-19 IgG Antibody Interpretation: Negative (01-16-21 @ 13:31)  COVID-19 PCR: Detected (01-16-21 @ 00:38)  COVID-19 PCR: NotDetec (01-06-21 @ 06:01)  COVID-19 IgG Antibody Index: 0.06 Index (01-05-21 @ 22:41)  COVID-19 IgG Antibody Interpretation: Negative (01-05-21 @ 22:41)  COVID-19 PCR: NotDetec (01-05-21 @ 12:42)      All imaging and data are reviewed.   < from: CT Abdomen and Pelvis No Cont (01.16.21 @ 03:03) >  IMPRESSION:  Marked interval improvement in multifocal pneumonia.  Emphysema.  Compared to the recent CT of January 5, 2021, interval development of colitis of the sigmoid colon and rectum, of infectious or inflammatory etiology.  Redemonstration of gallstones.    Assessment and Plan:   84yo female, with PMH afib (on Coumadin), CVA and h/o aneurysm repair, infrarenal AAA, HTN, HLD, mitral valve prolapse, PVD of RLE s/p stents x3, rheumatoid arthritis, s/p hysterectomy, and L cataract, BIBEMS from [facility] for hypoxia, fever and lethargy. Pt was found to be satting 87% on RA and temperature of 101 F as per facility documentation. Pt tested positive for COVID on 1/12/21. The pt is s/p recent admission to the [facility] 1/5-1/8, was treated for PNA - received Zithromax x 3 days, completed a course of IV Zosyn, discharged home on Augmentin x 7 days. The had a low grade fever of 100.4 yesterday night, on supplemental O2 via NC, lethargic, WBC 11.27 on admission, decreased slightly now, + BRIDGET, with normal lactate, + elevated D-dimer 777, with normal liver enzymes, COVID 19 test is positive. CT head - no change from the study on the previous recent admission.   On admission the pt complained of diffuse abdominal pain, CT chest/abd/pelvis without contrast was performed - marked improvement in multifocal pneumonia, + emphysema, + colititis of sigmoid colon and rectum of inflammatory or infectious etiology, + gallstones that were seen on previous admission, HIDA scan was done then and negative. As per RN, the pt did not have any bowel movements overnight or in the morning.  On exam, the pt is lethargic, on supplemental O2 via NC, able to answer some questions appropriately but drifts into sleep in the middle of the sentence, pt's abdomen is diffusely tender upon palpation especially in the left lower quadrant. The pt was started on antimicrobial therapy on admission with Zosyn IV, remdesivir held secondary to BRIDGET, pt is on Decadron. +    Patient complains of heel pain, exam without local inflammatory signs, warm/well perfused appearing. Postop changes. Does not appear to have limb ischemia. Chest exam with diminished breath sounds, SaO2 NC 93%. . Abdominal exam with significant tenderness LLQ > suprapubic area >> elsewhere. No rebound. + guarding. Deforming arthritis c/w known RA.  Patient denies diarrhea prior to admission or here in the hospital, states passed flatus. At risk for resistant daniele given recent zosyn, antibiotics altered. Need to remain vigilant for C. difficile. Patient w RA, no DMARDS/biologics known to place patient at increased risk for opportunistic pathogens. Patient with prior hx of mesenteric ischemia but pain not out of proportion to physical findings, rectum unsual location to have ischemia given duplicate blood supply. COVID certainly potentially associated with hyperocoagulable states. Merits decadron but will suppress abdominal exam. Remdesivir not shown to impact mortality, in setting of BRIDGET potentially harmful, would avoid.     1/17: no fevers, WBC increased - pt is on steroids, Cr increased - off remdesivir, Decadron continued, pt did not have a bowel movement as per RN and nursing assistant, remain vigilant for C. Diff if the pt will start having bowel movements.   1/18: No fever, leukocytosis worse but on steroid. Blood cultures are negative. Creat 2.4 stable since yesterday but baseline is lower.   1/20: No fever, doing better, no abdominal pain in exam.   1/22: On supplemental o2 with NRB, worsening of her condition. Abdomen exam is benign.     1. Sigmoid colitis - no bowel movements   2. COVID 19   3. Fever  4. Leukocytosis  5. BRIDGET    - continue cefepime - creat has improved so dose has been increased   - continue Flagyl 500 mg q 8 horus  - Can be stopped tomorrow after morning dose. Started 1/16.    - If develops diarrhea please send C diff and GI PCR  - Remdesivir was held due to CrCl<30 initially but now Creat imrpoved, but will not benefit form starting now.   - complete Decadron x 10 days   - blood cultures NGTD  - monitor pt's temperatures  - monitor WBC  - monitor pt's renal function, improving   - supplemental O2 for O2 SAT >92%  - precautions as per protocol  - anticoagulation as per protocol  - GI follow up noted  - palliative care follow up noted     Will follow PRN.    Sindi Anthony MD  Division of infectious Diseases  Cell [phone] between 8am and 6pm  After 6pm and over the weekends please call ID service line at [phone].

## 2021-01-24 NOTE — PROGRESS NOTE ADULT - SUBJECTIVE AND OBJECTIVE BOX
Patient is a 85y old  Female who presents with a chief complaint of AMS, lethargy (21 Jan 2021 16:48)    ----  INTERVAL HPI/OVERNIGHT EVENTS: Pt seen and evaluated at the bedside. overnight no further events of desaturation , patient remains on  NR. Discussed  with nurse. stable, patient reports not feeling well today, but denies any pain, s. 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  Osteoarthritis  Afib  PVD (peripheral vascular disease)  Neuropathy  (Right lower leg)  H/O cerebral aneurysm repair  brain clips  CVA (cerebral vascular accident)  Rheumatoid arthritis  Hyperlipidemia  Hypertension    Surgical hx  S/P cataract surgery  (Left eye)    Elective surgery  (&quot;Twisted bowel&quot;, 2014)    Elective surgery  (Exision of cyst on liver, 1985)    S/P ORIF (open reduction internal fixation) fracture  Left hip fx (2012) &amp; R hip fx (2013)    H/O cerebral aneurysm repair  Brain clips (1978(    Renal stone  Cysto stent placement 10/1/2014    PVD (peripheral vascular disease)  s/p RLE bypass x 3, most recent 3/2012 Right external iliac to PT bypass w/ PTFE (2012)    S/P FRED (total abdominal hysterectomy)  (1987, Hx of ovarian cancer?)      FAMILY HISTORY:  No pertinent family history in first degree relatives     Allergies; No Known Allergies    Intolerances        ----      MEDICATIONS  (STANDING):  ascorbic acid 500 milliGRAM(s) Oral daily  aspirin  chewable 81 milliGRAM(s) Oral daily  atorvastatin 40 milliGRAM(s) Oral at bedtime  budesonide  80 MICROgram(s)/formoterol 4.5 MICROgram(s) Inhaler 2 Puff(s) Inhalation two times a day  cefepime   IVPB 1000 milliGRAM(s) IV Intermittent every 12 hours  dexAMETHasone  Injectable 6 milliGRAM(s) IV Push daily  dextrose 5%. 1000 milliLiter(s) (50 mL/Hr) IV Continuous <Continuous>  diltiazem    milliGRAM(s) Oral daily  furosemide   Injectable 40 milliGRAM(s) IV Push once  gabapentin 100 milliGRAM(s) Oral three times a day  lactobacillus acidophilus 1 Tablet(s) Oral three times a day  metoprolol tartrate 12.5 milliGRAM(s) Oral every 6 hours  metroNIDAZOLE    Tablet 500 milliGRAM(s) Oral every 8 hours  multivitamin 1 Tablet(s) Oral daily  pantoprazole    Tablet 40 milliGRAM(s) Oral every 12 hours  sodium chloride 0.65% Nasal 1 Spray(s) Both Nostrils three times a day  tiotropium 18 MICROgram(s) Capsule 1 Capsule(s) Inhalation daily      MEDICATIONS  (PRN):    acetaminophen   Tablet .. 650 milliGRAM(s) Oral every 6 hours PRN Temp greater or equal to 38C (100.4F), Mild Pain (1 - 3)  ALBUTerol    90 MICROgram(s) HFA Inhaler 2 Puff(s) Inhalation every 3 hours PRN Shortness of Breath and/or Wheezing  guaiFENesin   Syrup  (Sugar-Free) 200 milliGRAM(s) Oral every 6 hours PRN Cough      ----  REVIEW OF SYSTEMS:  GENERAL: not feeling well  SKIN: denies  rash  CARDIOVASCULAR: denies chest pain, chest pressure, palpitations  RESPIRATORY: denies shortness of breath, sputum production  GASTROINTESTINAL: denies nausea, vomiting, diarrhea, abdominal pain  NEUROLOGICAL: denies numbness, headache, focal weakness  MUSCULOSKELETAL: denies new joint pain, muscle aches  HEMATOLOGIC: denies gross bleeding    ----  PHYSICAL EXAM:  GENERAL: patient appears ill, no acute distress, NR  EYES: sclera clear, no exudates  ENT: moist mucous membranes  NECK: supple, soft, no thyromegaly noted  LUNGS:  clear to auscultation but decrease breath sounds, no wheezing or rhonchi, no labored breathing  HEART: soft S1/S2, regular rate and rhythm, no murmurs noted, no noted edema to b/l LE  GASTROINTESTINAL: abdomen is soft, nontender, nondistended, normoactive bowel sounds, no palpable masses  INTEGUMENT: poor skin turgor, no visualized rashes, no jaundice noted  MUSCULOSKELETAL: no clubbing or cyanosis, no obvious deformity  NEUROLOGIC: awake, alert, no focal signs    Vital Signs Last 24 Hrs  T(C): 36.4 (24 Jan 2021 13:58), Max: 36.4 (24 Jan 2021 06:18)  T(F): 97.5 (24 Jan 2021 13:58), Max: 97.5 (24 Jan 2021 06:18)  HR: 73 (24 Jan 2021 15:58) (62 - 73)  BP: 140/75 (24 Jan 2021 15:58) (129/80 - 156/85)  BP(mean): --  RR: 19 (24 Jan 2021 13:58) (18 - 19)  SpO2: 100% (24 Jan 2021 15:58) (97% - 100%)    ----  I&O's Summary    23 Jan 2021 07:01  -  24 Jan 2021 07:00  --------------------------------------------------------  IN: 500 mL / OUT: 585 mL / NET: -85 mL        20 Jan 2021 07:01  -  21 Jan 2021 07:00  --------------------------------------------------------  IN: 0 mL / OUT: 750 mL / NET: -750 mL        LABS:                                     8.8    20.76 )-----------( 245      ( 24 Jan 2021 07:17 )             27.9                       9.3    20.51 )-----------( 260      ( 23 Jan 2021 07:29 )             29.6                8.8    15.33 )-----------( 223      ( 21 Jan 2021 06:43 )             28.0       01-24    146<H>  |  117<H>  |  43<H>  ----------------------------<  129<H>  4.1   |  22  |  1.10    Ca    8.3<L>      24 Jan 2021 07:17  Mg     2.0     01-24    TPro  6.0  /  Alb  2.1<L>  /  TBili  0.8  /  DBili  x   /  AST  26  /  ALT  20  /  AlkPhos  93  01-24    01-23    150<H>  |  121<H>  |  43<H>  ----------------------------<  112<H>  4.0   |  22  |  1.10    Ca    8.6      23 Jan 2021 07:29    TPro  6.2  /  Alb  2.3<L>  /  TBili  0.9  /  DBili  x   /  AST  35  /  ALT  22  /  AlkPhos  91  01-23 01-21    149<H>  |  122<H>  |  51<H>  ----------------------------<  117<H>  3.5   |  19<L>  |  1.40<H>    Ca    8.1<L>      21 Jan 2021 06:43    TPro  5.8<L>  /  Alb  2.3<L>  /  TBili  0.6  /  DBili  x   /  AST  32  /  ALT  23  /  AlkPhos  79  01-21    PT/INR - ( 20 Jan 2021 09:28 )   PT: 19.6 sec;   INR: 1.72 ratio             CAPILLARY BLOOD GLUCOSE

## 2021-01-25 NOTE — PROGRESS NOTE ADULT - ASSESSMENT
85f well known to our office and sees Dr. Laughlin.  She has afib (on Coumadin), GIB, CVA and h/o intracranial aneurysm repair, infrarenal AAA, HTN, HLD, mitral valve prolapse, PVD of RLE with failed bypass, failed redo bypass and failed multiple stents, with persistent occlusion.  She has rheumatoid arthritis, s/p hysterectomy, and L cataract.  She is known to have a borderline lvef of 50 by echo done 1 week ago when she was hosp with pna and an acute abd process.  At that time she was more rapid af given that she was not taking her meds, and her meds were adjusted to include metop 12.5 q6 and dilt 240.  Metop was changed to xl 100 at [facility] from [facility] for hypoxia, fever and lethargy. She has been treated for COVID pna/resp failure and colitis. She is being treated with decadron for COVID, but her BRIDGET precludes her from receiving remdesivir.  GI/ID following for colitis and recently switched to cefepime and flagyl.      Afib with RVR  - Off tele but rate-controlled per flow sheet  - No evidence of acute ischemia or volume overload  - Now on comfort care.  Will sign out.  Please, call us if we be of any more assistance    Covid Pna  - Requiring 100% NRB  - Supportive care.      Thank you for the opportunity to participate on Ms Bowens's care    Isabel Quintero DNP, NP-C  Cardiology   Spectra #[phone]     85f well known to our office and sees Dr. Laughlin.  She has afib (on Coumadin), GIB, CVA and h/o intracranial aneurysm repair, infrarenal AAA, HTN, HLD, mitral valve prolapse, PVD of RLE with failed bypass, failed redo bypass and failed multiple stents, with persistent occlusion.  She has rheumatoid arthritis, s/p hysterectomy, and L cataract.  She is known to have a borderline lvef of 50 by echo done 1 week ago when she was hosp with pna and an acute abd process.  At that time she was more rapid af given that she was not taking her meds, and her meds were adjusted to include metop 12.5 q6 and dilt 240.  Metop was changed to xl 100 at [facility] from [employer] for hypoxia, fever and lethargy. She has been treated for COVID pna/resp failure and colitis. She is being treated with decadron for COVID, but her BRIDGET precludes her from receiving remdesivir.  GI/ID following for colitis and recently switched to cefepime and flagyl.      Afib with RVR  - Off tele but rate-controlled per flow sheet  - No evidence of acute ischemia or volume overload  - Now on comfort care.  Will sign out.  Please, call us if we could be of any more assistance    Covid Pna  - Requiring 100% NRB  - Supportive care.      Thank you for the opportunity to participate on Ms Bowens's care    Isabel Quintero DNP, NP-C  Cardiology   Spectra #[phone]

## 2021-01-25 NOTE — DISCHARGE NOTE PROVIDER - NSDCCPCAREPLAN_GEN_ALL_CORE_FT
PRINCIPAL DISCHARGE DIAGNOSIS  Diagnosis: COVID-19  Assessment and Plan of Treatment: treated with dexamethasone and treatment completed, no treated with remdesivir due to kidney function. initially required NC but O2 requirements increase, patient has remain stable with NR mask, any attemp of downgrading was unsuccesful.      SECONDARY DISCHARGE DIAGNOSES  Diagnosis: Acute kidney injury superimposed on CKD  Assessment and Plan of Treatment: Acute kidney injury superimposed on CKD, followed by nephrology, resolved with hydration, meds were renally dose, chaudhary was place to monitor output and later removed after improvement.    Diagnosis: Colitis  Assessment and Plan of Treatment: Colitis initially treated with zosyn that later was downgraded to cefepime and flagyl, remains afebrile and no diarrhea, abdomen exam bening    Diagnosis: Hypertension  Assessment and Plan of Treatment: Hypertension stable with occasinal exacerbations, medication carefully modify to preserve renal perfusion, continue to monitor    Diagnosis: Afib  Assessment and Plan of Treatment: Afib initally with RVR due to infection, later improved and remained rate control on warfarin, last INR therapeutic. discharge on warfarin 2mg tablet daily, INR needs to be closely  monitor. no episodes of bleeding    Diagnosis: AAA (abdominal aortic aneurysm)  Assessment and Plan of Treatment: AAA (abdominal aortic aneurysm)    Diagnosis: Altered mental status, unspecified altered mental status type  Assessment and Plan of Treatment: Altered mental status due to methabolic encephalopathy secondary to COVID. mentation improved, patient not agitated at discharge

## 2021-01-25 NOTE — DISCHARGE NOTE PROVIDER - DETAILS OF MALNUTRITION DIAGNOSIS/DIAGNOSES
This patient has been assessed with a concern for Malnutrition and was treated during this hospitalization for the following Nutrition diagnosis/diagnoses:     -  01/23/2021: Severe protein-calorie malnutrition   This patient has been assessed with a concern for Malnutrition and was treated during this hospitalization for the following Nutrition diagnosis/diagnoses:     -  01/23/2021: Severe protein-calorie malnutrition    This patient has been assessed with a concern for Malnutrition and was treated during this hospitalization for the following Nutrition diagnosis/diagnoses:     -  01/23/2021: Severe protein-calorie malnutrition

## 2021-01-25 NOTE — PROGRESS NOTE ADULT - ASSESSMENT
84 yo F from SNF with PMHx of HTN, HL, Afib/coumadin, hx CVA/aneurysm repair, infrarenal AAA, PVD/RLE stents, RA; admitted for acute hypoxic resp failure, COVID19 PNA, colitis, sepsis and Afib RVR.    1) Resp failure 2/2 COVID PNA (now on NRBM 100%)  2) Failure to thrive, functional decline, advanced age, SOB, vascular dementia, AMS  3) Goals of care/advance care planning  - Palliative performance scale score: 20% (poor)  - Prognosis: hours-days (pt is hospice eligible)   - Code status: DNR/DNI (MOLST form in the chart)  - GOC: comfort care   - HCP: [facility]  - Psychosocial support provided  -  referral to inpatient hospice    4) SOB  - start dilaudid 0.2 mg IVP q4h RTC  - O2 via NRBM    5) Agitation  - start ativan 1 mg IVP q4h prn    Discussed with the primary team.    Petr Rosa MD

## 2021-01-25 NOTE — PROGRESS NOTE ADULT - TREATMENT GUIDELINES
DNR Order/Comfort measures only/Do not re-hospitalize/No blood draws/No artificial nutrition/No antibiotics/No IV fluids

## 2021-01-25 NOTE — PROGRESS NOTE ADULT - PROBLEM/PLAN-2
DISPLAY PLAN FREE TEXT
syncope
DISPLAY PLAN FREE TEXT

## 2021-01-25 NOTE — PROGRESS NOTE ADULT - PROBLEM SELECTOR PLAN 3
o2 support  id following
- fredi on CKD stage 3   - likely 2/2 poor po intake in the setting of infection , covid/colitis  - Continue gentle hydration, since renal function getting worse. Nephro consult appreciated   - hold nephrotoxic meds
o2 support  id following
- fredi on CKD stage 3   - likely 2/2 poor po intake in the setting of infection , covid/colitis  - Continue gentle hydration, since renal function getting worse. Nephro consult appreciated   - hold nephrotoxic meds

## 2021-01-25 NOTE — PROGRESS NOTE ADULT - ASSESSMENT
BRIDGET: Prerenal azotemia, ATN  COVID +, Pneumonia  Colitis  Hypertension now with hypotension  Coagulopathy    Stable renal indices. Encourage PO intake as tolerated. To continue current meds. Avoid nephrotoxic meds as possible.   Monitor BP trend. Will follow electrolytes and renal function trend.  BRIDGET: Prerenal azotemia, ATN  COVID +, Pneumonia  Colitis  Hypertension now with hypotension  Coagulopathy    Improved renal indices. Overall prognosis poor. Palliative care follow up.  For hospice.    BRIDGET: Prerenal azotemia, ATN  COVID +, Pneumonia  Colitis  Hypertension  Coagulopathy    Improved renal indices. Overall prognosis poor. Palliative care follow up.  For hospice.

## 2021-01-25 NOTE — DISCHARGE NOTE PROVIDER - NSDCMRMEDTOKEN_GEN_ALL_CORE_FT
acetaminophen 325 mg oral tablet: 2 tab(s) orally every 6 hours, As needed, Mild Pain (1 - 3)  aspirin 81 mg oral delayed release tablet: 1 tab(s) orally once a day  dilTIAZem 240 mg/24 hours oral capsule, extended release: 1 cap(s) orally once a day  HYDROmorphone:   LORazepam:   metoprolol succinate 100 mg oral tablet, extended release: 1 tab(s) orally once a day  pantoprazole 40 mg oral delayed release tablet: 1 tab(s) orally every 12 hours  warfarin 2 mg oral tablet: 1 tab(s) orally once a day    HYDROmorphone:   LORazepam:

## 2021-01-25 NOTE — DISCHARGE NOTE PROVIDER - HOSPITAL COURSE
FROM ADMISSION H+P:   HPI:  84yo F, with PMH afib (on Coumadin), CVA and h/o aneurysm repair, infrarenal AAA, HTN, HLD, mitral valve prolapse, PVD of RLE s/p stents x3, rheumatoid arthritis, s/p hysterectomy, and L cataract, BIBEMS from [facility] for hypoxia, fever and lethargy. Pt was found to be satting 87% on RA and temperature of 101 F as per facility documentation. Pt tested positive for COVID on 1/12/21. Pt was lethargic and history is unable to be obtained.  After a few hours when seen, pt was more alert and complaining of diffuse abdominal pain.   Of note, pt was recently admitted to [facility] (1/5/21-1/8/21) for multifocal PNA and was discharged on antibiotics and supplemental oxygen.   History was obtained from daughter Dixie.   As per daughter, pt     ED Course:   Vitals: BP: 137/95, HR:103 , Temp: 97.6 F, RR: 16, SpO2: 100% on NRB -->100% on 3L NC, desaturates to 90% on RA   Labs: WBC: 11.27, H/H: 11.2/34.6, neut: 10.35, ddimer: 777, PT/INR: 19/1.66, lactate: 1.0, bicarb: 17, BUN/Cr: 41/1.70, Ca: 7.6, GFR: 27  COVID PCR POSITIVE   VBG: pH: 7.37, pCO2: 36, pO2: 65, HCO3: 21, SpO2: 90%    Imaging:   CXR: left sided infiltrate improved when compared to CXR from 1/5 - pending official read   CT Head: No significant interval change compared to the previous CT of January 5, 2021.No intracranial hemorrhage, mass effect or large acute cortical infarct. Focal old left frontal and temporal encephalomalacia.  Old bilateral lacunar infarcts.  CT Chest: Marked interval improvement in multifocal pneumonia.  Emphysema. Compared to the recent CT of January 5, 2021.  CT Abdomen and Pelvis: Interval development of colitis of the sigmoid colon and rectum, of infectious or inflammatory etiology. Redemonstration of gallstones.  EKG: afib, HR: 105, no ST elevation or depression   Received Tylenol, Zosyn, NS 1L bolus x1 in the ED   (16 Jan 2021 04:50)    ---  HOSPITAL COURSE:   Presented with hypoxic respiratory failure complicated with sepsis due to COVID-19 and colitis  Patient as well found to have Afib with RVR and BRIDGET. patient was started on gentle hydration , as well dexamethasone, but no remdesivir due to BRIDGET, as well supportive treatment with O2. started on broad spectrum antibiotics for colitis. BCx were ordered . patient had episodes of transient hypotension that did not required any pressor support. due to AMS CT head was negative for acute intracranial process.  patient remained stable but oxygen requirement increase , was upgrade from NC to NR. antibiotic were adjusted by ID  and change to flagyl and cefepime. vs stabilized. as well as kidney function.   During this hospitalization patient was treated for acute on chronic anemia, hgb remained stable, no bleeding reported. Palliative care was consulted for assessment, patient was made DNR/DNI.     ---  CONSULTANTS:   Nephrology  ID  Cardiology  GI  Pulmonary   ---  TIME SPENT:  I, the attending physician, was physically present for the key portions of the evaluation and management (E/M) service provided. The total amount of time spent reviewing the hospital notes, laboratory values, imaging findings, assessing/counseling the patient, discussing with consultant physicians, social work, nursing staff was 35 minutes

## 2021-01-25 NOTE — PROGRESS NOTE ADULT - PROBLEM SELECTOR PROBLEM 1
Colitis
COVID-19
Colitis
Colitis
COVID-19
COVID-19
Colitis
COVID-19
Colitis
Colitis
COVID-19
Colitis
COVID-19
COVID-19

## 2021-01-25 NOTE — PROGRESS NOTE ADULT - NSREFPHYEXREFTO_GEN_ALL_CORE
Inpatient Physical Exam

## 2021-01-25 NOTE — PROGRESS NOTE ADULT - PROVIDER SPECIALTY LIST ADULT
Hospitalist
Infectious Disease
Pulmonology
Pulmonology
Gastroenterology
Hospitalist
Infectious Disease
Nephrology
CCU
Cardiology
Gastroenterology
Hospitalist
Infectious Disease
Nephrology
Palliative Care
Pulmonology
Pulmonology
Cardiology
Infectious Disease
Hospitalist
Pulmonology
Gastroenterology
Hospitalist
Hospitalist

## 2021-01-25 NOTE — PROGRESS NOTE ADULT - SUBJECTIVE AND OBJECTIVE BOX
INTERVAL HPI/OVERNIGHT EVENTS:  chart reviewed,  interim events noted  pt offers no GI complaints         MEDICATIONS  (STANDING):  ascorbic acid 500 milliGRAM(s) Oral daily  aspirin enteric coated 81 milliGRAM(s) Oral daily  atorvastatin 40 milliGRAM(s) Oral at bedtime  budesonide  80 MICROgram(s)/formoterol 4.5 MICROgram(s) Inhaler 2 Puff(s) Inhalation two times a day  cefepime   IVPB      cefepime   IVPB 1000 milliGRAM(s) IV Intermittent every 24 hours  dexAMETHasone  Injectable 6 milliGRAM(s) IV Push daily  diltiazem    milliGRAM(s) Oral daily  gabapentin 100 milliGRAM(s) Oral three times a day  lactobacillus acidophilus 1 Tablet(s) Oral three times a day  metoprolol tartrate 12.5 milliGRAM(s) Oral every 6 hours  metroNIDAZOLE    Tablet 500 milliGRAM(s) Oral every 8 hours  multivitamin 1 Tablet(s) Oral daily  pantoprazole    Tablet 40 milliGRAM(s) Oral every 12 hours  sodium chloride 0.65% Nasal 1 Spray(s) Both Nostrils three times a day  tiotropium 18 MICROgram(s) Capsule 1 Capsule(s) Inhalation daily    MEDICATIONS  (PRN):  acetaminophen   Tablet .. 650 milliGRAM(s) Oral every 6 hours PRN Temp greater or equal to 38C (100.4F), Mild Pain (1 - 3)  ALBUTerol    90 MICROgram(s) HFA Inhaler 2 Puff(s) Inhalation every 3 hours PRN Shortness of Breath and/or Wheezing  guaiFENesin   Syrup  (Sugar-Free) 200 milliGRAM(s) Oral every 6 hours PRN Cough      Allergies    No Known Allergies    Intolerances        Review of Systems:  tele f/u      Vital Signs Last 24 Hrs  T(C): 36.4 (24 Jan 2021 06:18), Max: 36.4 (23 Jan 2021 10:57)  T(F): 97.5 (24 Jan 2021 06:18), Max: 97.5 (23 Jan 2021 10:57)  HR: 73 (24 Jan 2021 06:18) (68 - 77)  BP: 151/87 (24 Jan 2021 06:18) (135/80 - 156/85)  BP(mean): --  RR: 18 (24 Jan 2021 06:18) (18 - 18)  SpO2: 97% (24 Jan 2021 06:18) (97% - 99%)          PHYSICAL EXAM:  tele f/u              LABS:                        8.8    20.76 )-----------( 245      ( 24 Jan 2021 07:17 )             27.9     01-24    146<H>  |  117<H>  |  43<H>  ----------------------------<  129<H>  4.1   |  22  |  1.10    Ca    8.3<L>      24 Jan 2021 07:17  Mg     2.0     01-24    TPro  6.0  /  Alb  2.1<L>  /  TBili  0.8  /  DBili  x   /  AST  26  /  ALT  20  /  AlkPhos  93  01-24    PT/INR - ( 23 Jan 2021 07:29 )   PT: 20.6 sec;   INR: 1.81 ratio         PTT - ( 23 Jan 2021 07:29 )  PTT:31.2 sec      01-23-21 @ 07:01  -  01-24-21 @ 07:00  --------------------------------------------------------  IN: 500 mL / OUT: 585 mL / NET: -85 mL      RADIOLOGY & ADDITIONAL TESTS:

## 2021-01-25 NOTE — PROGRESS NOTE ADULT - PROBLEM SELECTOR PLAN 2
ac on hold given supratherapeutic inr
per cards  trend coags
per cards  trend coags
ac on hold given supratherapeutic inr
c/o diffuse abdominal pain   lactate: 1.0, WBC: 11.27, HR: 103 in the ED   - CT Abdomen/Pelvis: interval development of colitis of the sigmoid colon and rectum, of infectious or inflammatory etiology. Redemonstration of gallstones from last admission -->HIDA during last admission was negative ruling out acute cholecystitis   - Tylenol prn for pain and fever   - Trend daily CBC and temperature curve  - Received Tylenol, Zosyn, NS 1L bolus x1 in the ED  - C/w Cefepime and Flagyl per ID   - F/u final UCx x1, BCx x2  - Gastroenterology Dr. Lassiter consulted, f/u recs
per cards  trend coags
ac per primary
ac on hold given supratherapeutic inr  sp vitamin k  f/u am labs
c/o diffuse abdominal pain   lactate: 1.0, WBC: 11.27, HR: 103 in the ED   - CT Abdomen/Pelvis: interval development of colitis of the sigmoid colon and rectum, of infectious or inflammatory etiology. Redemonstration of gallstones from last admission -->HIDA during last admission was negative ruling out acute cholecystitis   - Tylenol prn for pain and fever   - Trend daily CBC and temperature curve  - Received Tylenol, Zosyn, NS 1L bolus x1 in the ED  - C/w Cefepime and Flagyl per ID   - F/u final UCx x1, BCx x2  - Gastroenterology Dr. Lassiter consulted, f/u recs

## 2021-01-25 NOTE — PROGRESS NOTE ADULT - PROBLEM SELECTOR PROBLEM 3
COVID-19
Acute kidney injury superimposed on CKD
COVID-19
Acute kidney injury superimposed on CKD
Acute kidney injury superimposed on CKD

## 2021-01-25 NOTE — PROGRESS NOTE ADULT - SUBJECTIVE AND OBJECTIVE BOX
Patient is a 85y old  Female who presents with a chief complaint of AMS, lethargy (17 Jan 2021 09:12)  Patient seen in follow up for BRIDGET.     Pt resting in bed.        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    Osteoarthritis    Afib    PVD (peripheral vascular disease)    Neuropathy    Gout    H/O cerebral aneurysm repair    CVA (cerebral vascular accident)    Rheumatoid arthritis    Asthma    Hyperlipidemia    Hypertension      MEDICATIONS  (STANDING):  HYDROmorphone  Injectable 0.2 milliGRAM(s) IV Push every 4 hours    MEDICATIONS  (PRN):  LORazepam   Injectable 1 milliGRAM(s) IV Push every 4 hours PRN Agitation    T(C): 36.3 (01-25-21 @ 11:45), Max: 36.4 (01-24-21 @ 06:18)  HR: 63 (01-25-21 @ 11:45) (62 - 80)  BP: 136/75 (01-25-21 @ 11:45) (129/80 - 157/82)  RR: 16 (01-25-21 @ 11:45)  SpO2: 96% (01-25-21 @ 11:45)  Wt(kg): --  I&O's Detail    24 Jan 2021 07:01  -  25 Jan 2021 07:00  --------------------------------------------------------  IN:    dextrose 5%: 600 mL    IV PiggyBack: 50 mL  Total IN: 650 mL    OUT:    Indwelling Catheter - Urethral (mL): 520 mL  Total OUT: 520 mL    Total NET: 130 mL            PHYSICAL EXAM:  General: No distress  Respiratory: b/l air entry  Cardiovascular: S1 S2  Gastrointestinal: soft  Extremities:  no edema                         LABORATORY:                        8.8    23.67 )-----------( 212      ( 25 Jan 2021 07:07 )             27.8     01-25    142  |  110<H>  |  42<H>  ----------------------------<  108<H>  3.9   |  23  |  1.10    Ca    8.6      25 Jan 2021 07:07  Mg     2.0     01-24    TPro  6.0  /  Alb  2.1<L>  /  TBili  0.8  /  DBili  x   /  AST  26  /  ALT  20  /  AlkPhos  93  01-24    Sodium, Serum: 142 mmol/L (01-25 @ 07:07)  Sodium, Serum: 146 mmol/L (01-24 @ 07:17)    Potassium, Serum: 3.9 mmol/L (01-25 @ 07:07)  Potassium, Serum: 4.1 mmol/L (01-24 @ 07:17)    Hemoglobin: 8.8 g/dL (01-25 @ 07:07)  Hemoglobin: 8.8 g/dL (01-24 @ 07:17)  Hemoglobin: 9.3 g/dL (01-23 @ 07:29)    Creatinine, Serum 1.10 (01-25 @ 07:07)  Creatinine, Serum 1.10 (01-24 @ 07:17)  Creatinine, Serum 1.10 (01-23 @ 07:29)        LIVER FUNCTIONS - ( 24 Jan 2021 07:17 )  Alb: 2.1 g/dL / Pro: 6.0 g/dL / ALK PHOS: 93 U/L / ALT: 20 U/L / AST: 26 U/L / GGT: x

## 2021-01-25 NOTE — PROGRESS NOTE ADULT - PROBLEM SELECTOR PLAN 1
Hospice discussion under way -   86 yo F PMHx Afib (on Coumadin), CVA and h/o aneurysm repair, infrarenal AAA, HTN, HLD, mitral valve prolapse, PVD of RLE s/p stents x3, rheumatoid arthritis, s/p hysterectomy, and L cataract, recently d/ambreen (9 days ago) from [facility] for PNA BIBEMS from [facility] for evaluation of hypoxia 87% RA, temp 101 F  pt with Covid 19 illness - HFpEF - valvular heart disease and Moderate Emphysema  COPD - proventil PRN, Steroids, Spiriva - Symbicort Inhaler regimen, o2 support  Heart disease - proBNP elev - TTE reviewed - may need PRN diuresis and optimization of cvs rx regimen and BP control  Frailty - weakness - enc nturition - assist with needs and ADL  ID eval noted - ABX regimen in progress - broad spectrum -   CT chest shows improvement in airspace disease  monitor VS and HD and Sat  SLP eval - asp prec - HOB elev  GOC discussion - pall cx noted DNR  Covid 19 with hypoxemia and comorbidities - Decadron, o2 titration,
86 yo F PMHx Afib (on Coumadin), CVA and h/o aneurysm repair, infrarenal AAA, HTN, HLD, mitral valve prolapse, PVD of RLE s/p stents x3, rheumatoid arthritis, s/p hysterectomy, and L cataract, recently d/ambreen (9 days ago) from [facility] for PNA BIBEMS from [facility] for evaluation of hypoxia 87% RA, temp 101 F  pt with Covid 19 illness - HFpEF - valvular heart disease and Moderate Emphysema  COPD - proventil PRN, Steroids, Spiriva - Symbicort Inhaler regimen, o2 support  Heart disease - proBNP elev - TTE reviewed - may need PRN diuresis and optimization of cvs rx regimen and BP control  Frailty - weakness - enc nturition - assist with needs and ADL  ID eval noted - ABX regimen in progress -   CT chest shows improvement in airspace disease  monitor VS and HD and Sat  SLP eval - asp prec - HOB elev  GOC discussion - code status discussion  Covid 19 with hypoxemia and comorbidities - Decadron, o2 titration, consideration for Remdesivir, Robitussin and Tylenol for sx management.
ICU eval noted - Cardio eval noted - on IVF - on Broadened regimen of ABX  84 yo F PMHx Afib (on Coumadin), CVA and h/o aneurysm repair, infrarenal AAA, HTN, HLD, mitral valve prolapse, PVD of RLE s/p stents x3, rheumatoid arthritis, s/p hysterectomy, and L cataract, recently d/ambreen (9 days ago) from [facility]"" for PNA BIBEMS from [facility] for evaluation of hypoxia 87% RA, temp 101 F  pt with Covid 19 illness - HFpEF - valvular heart disease and Moderate Emphysema  COPD - proventil PRN, Steroids, Spiriva - Symbicort Inhaler regimen, o2 support  Heart disease - proBNP elev - TTE reviewed - may need PRN diuresis and optimization of cvs rx regimen and BP control  Frailty - weakness - enc nturition - assist with needs and ADL  ID eval noted - ABX regimen in progress - broad spectrum -   CT chest shows improvement in airspace disease  monitor VS and HD and Sat  SLP eval - asp prec - HOB elev  GOC discussion - code status discussion  Covid 19 with hypoxemia and comorbidities - Decadron, o2 titration, consideration for Remdesivir, Robitussin and Tylenol for sx management.
INR lower - monitor for Hemoptysis - nasal saline - humidification -  84 yo F PMHx Afib (on Coumadin), CVA and h/o aneurysm repair, infrarenal AAA, HTN, HLD, mitral valve prolapse, PVD of RLE s/p stents x3, rheumatoid arthritis, s/p hysterectomy, and L cataract, recently d/ambreen (9 days ago) from [facility] 87% RA, temp 101 F  pt with Covid 19 illness - HFpEF - valvular heart disease and Moderate Emphysema  COPD - proventil PRN, Steroids, Spiriva - Symbicort Inhaler regimen, o2 support  Heart disease - proBNP elev - TTE reviewed - may need PRN diuresis and optimization of cvs rx regimen and BP control  Frailty - weakness - enc nturition - assist with needs and ADL  ID eval noted - ABX regimen in progress - broad spectrum -   CT chest shows improvement in airspace disease  monitor VS and HD and Sat  SLP eval - asp prec - HOB elev  GOC discussion - code status discussion ongoing -   Covid 19 with hypoxemia and comorbidities - Decadron, o2 titration,
Pall cx noted, pt remains on high fio2 support - vs noted -   84 yo F PMHx Afib (on Coumadin), CVA and h/o aneurysm repair, infrarenal AAA, HTN, HLD, mitral valve prolapse, PVD of RLE s/p stents x3, rheumatoid arthritis, s/p hysterectomy, and L cataract, recently d/ambreen (9 days ago) from [facility] for PNA BIBEMS from [facility] for evaluation of hypoxia 87% RA, temp 101 F  pt with Covid 19 illness - HFpEF - valvular heart disease and Moderate Emphysema  COPD - proventil PRN, Steroids, Spiriva - Symbicort Inhaler regimen, o2 support  Heart disease - proBNP elev - TTE reviewed - may need PRN diuresis and optimization of cvs rx regimen and BP control  Frailty - weakness - enc nturition - assist with needs and ADL  ID eval noted - ABX regimen in progress - broad spectrum -   CT chest shows improvement in airspace disease  monitor VS and HD and Sat  SLP eval - asp prec - HOB elev  GOC discussion - code status discussion ongoing - pall cx noted DNR  Covid 19 with hypoxemia and comorbidities - Decadron, o2 titration,
nc ct showing colitis of sigmoid colon and rectum  no overt gi bleeding, no diarrhea  if loose stools recur, rec sending stool studies  cont abx as per id  cont daily ppi  cont bacid tid  prn pain control  diet as tolerated  monitor exam/gi fxn
remains on high fio2 support  on IVF - am Na pending  monitor for vol overload - proBNP high  84 yo F PMHx Afib (on Coumadin), CVA and h/o aneurysm repair, infrarenal AAA, HTN, HLD, mitral valve prolapse, PVD of RLE s/p stents x3, rheumatoid arthritis, s/p hysterectomy, and L cataract, recently d/ambreen (9 days ago) from [facility] for PNA BIBEMS from [facility] for evaluation of hypoxia 87% RA, temp 101 F  pt with Covid 19 illness - HFpEF - valvular heart disease and Moderate Emphysema  COPD - proventil PRN, Steroids, Spiriva - Symbicort Inhaler regimen, o2 support  Heart disease - proBNP elev - TTE reviewed - may need PRN diuresis and optimization of cvs rx regimen and BP control  Frailty - weakness - enc nturition - assist with needs and ADL  ID eval noted - ABX regimen in progress - broad spectrum -   CT chest shows improvement in airspace disease  monitor VS and HD and Sat  SLP eval - asp prec - HOB elev  GOC discussion - code status discussion ongoing - pall cx noted DNR  Covid 19 with hypoxemia and comorbidities - Decadron, o2 titration,
seen - examined - frail - Heme cx noted - labs reviewed - am WBC pending,   84 yo F PMHx Afib (on Coumadin), CVA and h/o aneurysm repair, infrarenal AAA, HTN, HLD, mitral valve prolapse, PVD of RLE s/p stents x3, rheumatoid arthritis, s/p hysterectomy, and L cataract, recently d/ambreen (9 days ago) from [facility] for PNA BIBEMS from [facility] for evaluation of hypoxia 87% RA, temp 101 F  pt with Covid 19 illness - HFpEF - valvular heart disease and Moderate Emphysema  COPD - proventil PRN, Steroids, Spiriva - Symbicort Inhaler regimen, o2 support  Heart disease - proBNP elev - TTE reviewed - may need PRN diuresis and optimization of cvs rx regimen and BP control  Frailty - weakness - enc nturition - assist with needs and ADL  ID eval noted - ABX regimen in progress - broad spectrum -   CT chest shows improvement in airspace disease  monitor VS and HD and Sat  SLP eval - asp prec - HOB elev  GOC discussion - code status discussion  Covid 19 with hypoxemia and comorbidities - Decadron, o2 titration,
nc ct showing colitis of sigmoid colon and rectum  if loose stools recur, rec sending stool studies  no overt gib, monitor cbc/inr daily  cont abx; f/u id recs  cont daily ppi  cont bacid tid  prn pain control  diet as tolerated  monitor exam/gi fxn
nc ct showing colitis of sigmoid colon and rectum  no overt gi bleeding, no diarrhea  if loose stools recur, rec sending stool studies  cont abx as per id  cont daily ppi  cont bacid tid  prn pain control  diet as tolerated  monitor exam/gi fxn
remains weak and frail and on o2 support - GOC updated - remains full code -   86 yo F PMHx Afib (on Coumadin), CVA and h/o aneurysm repair, infrarenal AAA, HTN, HLD, mitral valve prolapse, PVD of RLE s/p stents x3, rheumatoid arthritis, s/p hysterectomy, and L cataract, recently d/ambreen (9 days ago) from [facility] for PNA BIBEMS from [facility] for evaluation of hypoxia 87% RA, temp 101 F  pt with Covid 19 illness - HFpEF - valvular heart disease and Moderate Emphysema  COPD - proventil PRN, Steroids, Spiriva - Symbicort Inhaler regimen, o2 support  Heart disease - proBNP elev - TTE reviewed - may need PRN diuresis and optimization of cvs rx regimen and BP control  Frailty - weakness - enc nturition - assist with needs and ADL  ID eval noted - ABX regimen in progress - broad spectrum -   CT chest shows improvement in airspace disease  monitor VS and HD and Sat  SLP eval - asp prec - HOB elev  GOC discussion - code status discussion ongoing -   Covid 19 with hypoxemia and comorbidities - Decadron, o2 titration,
nc ct showing colitis of sigmoid colon and rectum  if loose stools recur, rec sending stool studies  no overt gib, monitor cbc/inr daily  cont abx; f/u id recs  cont daily ppi  cont bacid tid  prn pain control  diet as tolerated  monitor exam/gi fxn
Acute hypoxic respiratory failure 2/2 confirmed COVID-19 infection  - Tele/Pulse Ox  - Patient currently saturating well on 3L NC  - Supplemental O2 PRN to maintain O2 sats >88%, escalate therapy as appropriate. Prone PRN  - Monitor volume status closely, will exercise caution with aggressive hydration  - Continue decadron 6 mg x10 days   - Will hold off on remdesivir due to kidney function and also past 10 days window - pt at approx day 15 of symptoms including symptoms leading up to prior admission.  - Tylenol prn myalgias, fever  - Avoid nebs. continue with MDI prn.  - QTc: 496, prolonged   - Daily labs to include: CBC with differential along with a CMP  - Ferritin, CRP, d-dimer, procal at admission then will repeat as clinically warranted  - Will initiate VTE ppx unless absolute contraindication- Pt is on Coumadin due to afib. INR therapeutic   - Advanced care planning/code status: Full code, Palliative consult, call made today.   - ID following, recs appreciated
fio2 titrated up - confusion reported overnight - Blood gas ordered for this morning  86 yo F PMHx Afib (on Coumadin), CVA and h/o aneurysm repair, infrarenal AAA, HTN, HLD, mitral valve prolapse, PVD of RLE s/p stents x3, rheumatoid arthritis, s/p hysterectomy, and L cataract, recently d/ambreen (9 days ago) from [facility] for PNA BIBEMS from [facility] for evaluation of hypoxia 87% RA, temp 101 F  pt with Covid 19 illness - HFpEF - valvular heart disease and Moderate Emphysema  COPD - proventil PRN, Steroids, Spiriva - Symbicort Inhaler regimen, o2 support  Heart disease - proBNP elev - TTE reviewed - may need PRN diuresis and optimization of cvs rx regimen and BP control  Frailty - weakness - enc nturition - assist with needs and ADL  ID eval noted - ABX regimen in progress - broad spectrum -   CT chest shows improvement in airspace disease  monitor VS and HD and Sat  SLP eval - asp prec - HOB elev  GOC discussion - code status discussion ongoing -   Covid 19 with hypoxemia and comorbidities - Decadron, o2 titration,
nc ct showing colitis of sigmoid colon and rectum  no overt gi bleeding, no diarrhea  if loose stools recur, rec sending stool studies  cont abx as per id  cont daily ppi  cont bacid tid  prn pain control  diet as tolerated  monitor exam/gi fxn
nc ct showing colitis of sigmoid colon and rectum  if loose stools recur, rec sending stool studies  no overt gib overnight; f/u am labs  cont abx; f/u id recs  cont daily ppi  cont bacid tid  prn pain control  diet as tolerated  monitor exam/gi fxn
nc ct showing colitis of sigmoid colon and rectum  stool studies not sent as no bms  cont abx as per id  cont daily ppi  cont bacid tid  prn pain control  diet as tolerated  monitor exam/gi fxn
Acute hypoxic respiratory failure 2/2 confirmed COVID-19 infection  - Tele/Pulse Ox  - Patient currently saturating well on 3L NC  - Supplemental O2 PRN to maintain O2 sats >88%, escalate therapy as appropriate. Prone PRN  - Monitor volume status closely, will exercise caution with aggressive hydration  - Continue decadron 6 mg x10 days   - Will hold off on remdesivir due to kidney function--f/u AM GFR and can start remdesivir if GFR>30 - pt at approx day 14 of symptoms including symptoms leading up to prior admission.  - Tylenol prn myalgias, fever  - Avoid nebs. continue with MDI prn.  - QTc: 496, prolonged   - Daily labs to include: CBC with differential along with a CMP  - Ferritin, CRP, d-dimer, procal at admission then will repeat as clinically warranted  - Will initiate VTE ppx unless absolute contraindication- Pt is on Coumadin due to afib. INR therapeutic   - Advanced care planning/code status: Full code, Palliative consult   - ID following

## 2021-01-25 NOTE — PROGRESS NOTE ADULT - SUBJECTIVE AND OBJECTIVE BOX
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    Osteoarthritis    Afib    PVD (peripheral vascular disease)    Neuropathy  (Right lower leg)    Gout    H/O cerebral aneurysm repair  brain clips    CVA (cerebral vascular accident)  (&quot;Mini-stroke&quot;,1990&#x27;s)    Rheumatoid arthritis    Asthma    Hyperlipidemia    Hypertension    S/P cataract surgery  (Left eye)    Elective surgery  (&quot;Twisted bowel&quot;, 2014)    Elective surgery  (Exision of cyst on liver, 1985)    S/P ORIF (open reduction internal fixation) fracture  Left hip fx (2012) &amp; R hip fx (2013)    H/O cerebral aneurysm repair  Brain clips (1978(    Renal stone  Cysto stent placement 10/1/2014    PVD (peripheral vascular disease)  s/p RLE bypass x 3, most recent 3/2012 Right external iliac to PT bypass w/ PTFE (2012)    S/P FRED (total abdominal hysterectomy)  (1987, Hx of &quot;ovarian cancer?&quot;)          Medication list         MEDICATIONS  (STANDING):  ascorbic acid 500 milliGRAM(s) Oral daily  aspirin  chewable 81 milliGRAM(s) Oral daily  atorvastatin 40 milliGRAM(s) Oral at bedtime  budesonide  80 MICROgram(s)/formoterol 4.5 MICROgram(s) Inhaler 2 Puff(s) Inhalation two times a day  cefepime   IVPB 1000 milliGRAM(s) IV Intermittent every 12 hours  dextrose 5%. 1000 milliLiter(s) (50 mL/Hr) IV Continuous <Continuous>  diltiazem    milliGRAM(s) Oral daily  gabapentin 100 milliGRAM(s) Oral three times a day  lactobacillus acidophilus 1 Tablet(s) Oral three times a day  metoprolol tartrate 12.5 milliGRAM(s) Oral every 6 hours  metroNIDAZOLE    Tablet 500 milliGRAM(s) Oral every 8 hours  multivitamin 1 Tablet(s) Oral daily  pantoprazole    Tablet 40 milliGRAM(s) Oral every 12 hours  tiotropium 18 MICROgram(s) Capsule 1 Capsule(s) Inhalation daily    MEDICATIONS  (PRN):  acetaminophen   Tablet .. 650 milliGRAM(s) Oral every 6 hours PRN Temp greater or equal to 38C (100.4F), Mild Pain (1 - 3)  ALBUTerol    90 MICROgram(s) HFA Inhaler 2 Puff(s) Inhalation every 3 hours PRN Shortness of Breath and/or Wheezing  guaiFENesin   Syrup  (Sugar-Free) 200 milliGRAM(s) Oral every 6 hours PRN Cough         Vitals log        ICU Vital Signs Last 24 Hrs  T(C): 36.3 (25 Jan 2021 05:36), Max: 36.4 (24 Jan 2021 13:58)  T(F): 97.3 (25 Jan 2021 05:36), Max: 97.5 (24 Jan 2021 13:58)  HR: 80 (25 Jan 2021 05:36) (62 - 80)  BP: 157/82 (25 Jan 2021 05:36) (129/80 - 157/82)  BP(mean): --  ABP: --  ABP(mean): --  RR: 18 (25 Jan 2021 05:36) (18 - 19)  SpO2: 99% (25 Jan 2021 05:36) (99% - 100%)           Input and Output:  I&O's Detail    23 Jan 2021 07:01  -  24 Jan 2021 07:00  --------------------------------------------------------  IN:    dextrose 5%: 225 mL    dextrose 5%: 225 mL    IV PiggyBack: 50 mL  Total IN: 500 mL    OUT:    Indwelling Catheter - Urethral (mL): 300 mL    Voided (mL): 285 mL  Total OUT: 585 mL    Total NET: -85 mL      24 Jan 2021 07:01  -  25 Jan 2021 06:38  --------------------------------------------------------  IN:    dextrose 5%: 600 mL    IV PiggyBack: 50 mL  Total IN: 650 mL    OUT:    Indwelling Catheter - Urethral (mL): 520 mL  Total OUT: 520 mL    Total NET: 130 mL          Lab Data                        8.8    20.76 )-----------( 245      ( 24 Jan 2021 07:17 )             27.9     01-24    146<H>  |  117<H>  |  43<H>  ----------------------------<  129<H>  4.1   |  22  |  1.10    Ca    8.3<L>      24 Jan 2021 07:17  Mg     2.0     01-24    TPro  6.0  /  Alb  2.1<L>  /  TBili  0.8  /  DBili  x   /  AST  26  /  ALT  20  /  AlkPhos  93  01-24            Review of Systems	      Objective     Physical Examination    heart s1s2  lung dec BS  on o2 support      Pertinent Lab findings & Imaging      Fransico:  NO   Adequate UO     I&O's Detail    23 Jan 2021 07:01  -  24 Jan 2021 07:00  --------------------------------------------------------  IN:    dextrose 5%: 225 mL    dextrose 5%: 225 mL    IV PiggyBack: 50 mL  Total IN: 500 mL    OUT:    Indwelling Catheter - Urethral (mL): 300 mL    Voided (mL): 285 mL  Total OUT: 585 mL    Total NET: -85 mL      24 Jan 2021 07:01  -  25 Jan 2021 06:38  --------------------------------------------------------  IN:    dextrose 5%: 600 mL    IV PiggyBack: 50 mL  Total IN: 650 mL    OUT:    Indwelling Catheter - Urethral (mL): 520 mL  Total OUT: 520 mL    Total NET: 130 mL               Discussed with:     Cultures:	        Radiology

## 2021-01-25 NOTE — PROGRESS NOTE ADULT - SUBJECTIVE AND OBJECTIVE BOX
[facility] -- Dahiana Bañuelos, Evens, Toney, Truman Falcon Savella, Goodger  Office # [phone]    Follow Up:  Afib with RVR    Subjective/Observations: Awake but lethargic and confused.  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    Osteoarthritis    Afib    PVD (peripheral vascular disease)    Neuropathy  (Right lower leg)    H/O cerebral aneurysm repair  brain clips    CVA (cerebral vascular accident)  (&quot;Mini-stroke&quot;,1990&#x27;s)    Rheumatoid arthritis    Hyperlipidemia    Hypertension    S/P cataract surgery  (Left eye)    Elective surgery  (&quot;Twisted bowel&quot;, 2014)    Elective surgery  (Exision of cyst on liver, 1985)    S/P ORIF (open reduction internal fixation) fracture  Left hip fx (2012) &amp; R hip fx (2013)    H/O cerebral aneurysm repair  Brain clips (1978(    Renal stone  Cysto stent placement 10/1/2014    PVD (peripheral vascular disease)  s/p RLE bypass x 3, most recent 3/2012 Right external iliac to PT bypass w/ PTFE (2012)    S/P FRED (total abdominal hysterectomy)  (1987, Hx of &quot;ovarian cancer?&quot;)    MEDICATIONS  (STANDING):  HYDROmorphone  Injectable 0.2 milliGRAM(s) IV Push every 4 hours    MEDICATIONS  (PRN):    Allergies    No Known Allergies    Intolerances    Vital Signs Last 24 Hrs  T(C): 36.3 (25 Jan 2021 11:45), Max: 36.4 (24 Jan 2021 13:58)  T(F): 97.4 (25 Jan 2021 11:45), Max: 97.5 (24 Jan 2021 13:58)  HR: 63 (25 Jan 2021 11:45) (62 - 80)  BP: 136/75 (25 Jan 2021 11:45) (129/80 - 157/82)  BP(mean): --  RR: 16 (25 Jan 2021 11:45) (16 - 19)  SpO2: 96% (25 Jan 2021 11:45) (91% - 100%)  I&O's Summary    24 Jan 2021 07:01  -  25 Jan 2021 07:00  --------------------------------------------------------  IN: 650 mL / OUT: 520 mL / NET: 130 mL    PHYSICAL EXAM:  TELE: Not on tele  Constitutional: NAD, well-developed  HEENT: Moist Mucous Membranes, Anicteric  Pulmonary: Non-labored, breath sounds are diminished bilaterally, No wheezing, rales or rhonchi. On 100% NRM  Cardiovascular: IRRR, S1 and S2, + murmurs, no rubs, gallops or clicks  Gastrointestinal: Bowel Sounds present, soft, nontender.   Lymph: No peripheral edema. No lymphadenopathy.  Skin: No visible rashes or ulcers.  Psych:  Mood & affect: Flat, lethargic  LABS: All Labs Reviewed:                        8.8    23.67 )-----------( 212      ( 25 Jan 2021 07:07 )             27.8                         8.8    20.76 )-----------( 245      ( 24 Jan 2021 07:17 )             27.9                         9.3    20.51 )-----------( 260      ( 23 Jan 2021 07:29 )             29.6     25 Jan 2021 07:07    142    |  110    |  42     ----------------------------<  108    3.9     |  23     |  1.10   24 Jan 2021 07:17    146    |  117    |  43     ----------------------------<  129    4.1     |  22     |  1.10   23 Jan 2021 07:29    150    |  121    |  43     ----------------------------<  112    4.0     |  22     |  1.10     Ca    8.6        25 Jan 2021 07:07  Ca    8.3        24 Jan 2021 07:17  Ca    8.6        23 Jan 2021 07:29  Mg     2.0       24 Jan 2021 07:17    TPro  6.0    /  Alb  2.1    /  TBili  0.8    /  DBili  x      /  AST  26     /  ALT  20     /  AlkPhos  93     24 Jan 2021 07:17  TPro  6.2    /  Alb  2.3    /  TBili  0.9    /  DBili  x      /  AST  35     /  ALT  22     /  AlkPhos  91     23 Jan 2021 07:29    PT/INR - ( 25 Jan 2021 07:07 )   PT: 32.1 sec;   INR: 2.89 ratio      PTT - ( 25 Jan 2021 07:07 )  PTT:34.3 sec       EXAM:  ECHO TTE WO CON COMP W DOPP         PROCEDURE DATE:  01/06/2021        INTERPRETATION:  INDICATION: Palpitations  Sonographer TH    Blood Pressure 149/83    Height 154 cm     Weight 63 kg       BSA 1.6 sq m    Dimensions:  LA 4.4       Normal Values: 2.0 - 4.0 cm  Ao 2.6        Normal Values: 2.0 - 3.8 cm  SEPTUM 0.7       Normal Values: 0.6 - 1.2 cm  PWT 0.8       Normal Values: 0.6 - 1.1 cm  LVIDd 4.8         Normal Values: 3.0 - 5.6 cm  LVIDs 3.3         Normal Values: 1.8 - 4.0 cm      OBSERVATIONS:  Technically difficult study  Mitral Valve: Thickened leaflets, moderate MR.  Aortic Valve/Aorta: Sclerotic trileaflet aortic valve with normal opening. Mild AI  Tricuspid Valve: normal with trace TR.  Pulmonic Valve: Mild PI  Left Atrium: Enlarged  Right Atrium: Not well-visualized  Left Ventricle: Low-normal left ventricular systolic function, estimated LVEF of 50%.  Right Ventricle: Grossly normal size and systolic function.  Pericardium/Pleura: normal, no significant pericardial effusion.  Pulmonary/RV Pressure: estimated PA systolic pressure of 29 mmHg      Conclusion:  Technically difficult study  Low-normal left ventricular systolic function, estimated LVEF of 50%.  Grossly normal RV size and systolic function.  Left atrial enlargement  Sclerotic trileaflet aortic valve, mild AI.  Moderate MR  Mild TR.      JACQUELINE GOODEN MD; Attending Cardiologist  This document has been electronically signed. Jan 7 2021  5:51PM     EXAM:  CT ABDOMEN AND PELVIS                          EXAM:  CT CHEST                          PROCEDURE DATE:  01/16/2021      INTERPRETATION:  CT of the chest, abdomen and pelvis    Indication: [Hypoxia], unresponsive, fever    Technique: Axial images were obtained from the thoracic inlet through pubic symphysis without oral or IV contrast. Reformatted coronal and sagittal images were submitted.    Comparison: CT of 1/5/21    FINDINGS:    Evaluation of the solid abdominal organs is limited without contrast.    A 1.2 cm left thyroid hypodensity.  The visualized axilla and subcutaneous tissues are unremarkable.    The tracheobronchial tree is patent centrally.  There is no mediastinal hematoma.  The great vessels are not enlarged.  There are nonspecific mediastinal lymph nodes are unchanged, measuring 1.3 x 1.5 cm in the left paratracheal region and is stable and 1.1 x 0.8 cm in the right paratracheal region.  Atherosclerotic calcifications of the thoracic aorta and coronary arteries.    The heart is not enlarged.  There is no pericardial effusion.    Lungs: Mild to moderate centrilobular emphysema.  Previously visualized right middle and lower lobe and left upper lobe opacities are significantly decreased compared to the previous study of January 5, 2021. There are mild patchy opacities at the lung bases.    Pleura: Trace right pleural effusion.  There is no free air or focal collection.  No free fluid.    Liver: Normal.  Spleen: Normal.  Gallbladder: Moderately distendedwith gallstones.  Biliary tree: Unremarkable.  Adrenal glands: Normal.  Pancreas: Normal.  Kidneys: No hydronephrosis or obstructing stones. Nonobstructive right renal stones measure up to 7 mm.    Bowel:  There is no small bowel obstruction.  There is moderate thickening of the sigmoid colon and rectum compatible with colitis. There is surrounding inflammation without free fluid.    Bladder: Decompressed.  Pelvic organs: No pelvic masses.    There is no significant adenopathy.  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 Moderate atherosclerotic vascular calcification. Right external iliac bypass graft.    Retroperitoneum: There is no mass.  Bones: The bones are diffusely osteopenic. The patient is status post ORIF involving the bilateral proximal femurs  Subcutaneous tissues: Unremarkable.    IMPRESSION:    Marked interval improvement in multifocal pneumonia.  Emphysema.    Compared to the recent CT of January 5, 2021, interval development of colitis of the sigmoid colon and rectum, of infectious or inflammatory etiology.    Redemonstration of gallstones.    ALISIA BERNARD MD; Attending Radiologist  This document has been electronically signed. Jan 16 2021  3:44AM  Ventricular Rate 106 BPM    Atrial Rate 79 BPM    QRS Duration 104 ms    Q-T Interval 374 ms    QTC Calculation(Bazett) 496 ms    R Axis -47 degrees    T Axis 57 degrees    Diagnosis Line Atrial fibrillation with rapid ventricular response  Left axis deviation  Septal infarct , age undetermined  Abnormal ECG  When compared with ECG of 05-JAN-2021 12:06,  Septal infarct is now present  Confirmed by Tru Ziegler MD ([id number]) on 1/16/2021 4:36:07 PM

## 2021-01-25 NOTE — PROGRESS NOTE ADULT - SUBJECTIVE AND OBJECTIVE BOX
SUBJECTIVE AND OBJECTIVE/INTERVAL HPI/OVERNIGHT EVENTS:      DNR on chart: Yes      Allergies    No Known Allergies    Intolerances    MEDICATIONS  (STANDING):  ascorbic acid 500 milliGRAM(s) Oral daily  aspirin  chewable 81 milliGRAM(s) Oral daily  atorvastatin 40 milliGRAM(s) Oral at bedtime  budesonide  80 MICROgram(s)/formoterol 4.5 MICROgram(s) Inhaler 2 Puff(s) Inhalation two times a day  cefepime   IVPB 1000 milliGRAM(s) IV Intermittent every 12 hours  dextrose 5%. 1000 milliLiter(s) (50 mL/Hr) IV Continuous <Continuous>  diltiazem    milliGRAM(s) Oral daily  gabapentin 100 milliGRAM(s) Oral three times a day  lactobacillus acidophilus 1 Tablet(s) Oral three times a day  metoprolol tartrate 12.5 milliGRAM(s) Oral every 6 hours  metroNIDAZOLE    Tablet 500 milliGRAM(s) Oral every 8 hours  multivitamin 1 Tablet(s) Oral daily  pantoprazole    Tablet 40 milliGRAM(s) Oral every 12 hours  tiotropium 18 MICROgram(s) Capsule 1 Capsule(s) Inhalation daily    MEDICATIONS  (PRN):  acetaminophen   Tablet .. 650 milliGRAM(s) Oral every 6 hours PRN Temp greater or equal to 38C (100.4F), Mild Pain (1 - 3)  ALBUTerol    90 MICROgram(s) HFA Inhaler 2 Puff(s) Inhalation every 3 hours PRN Shortness of Breath and/or Wheezing  guaiFENesin   Syrup  (Sugar-Free) 200 milliGRAM(s) Oral every 6 hours PRN Cough      ITEMS UNCHECKED ARE NOT PRESENT    PRESENT SYMPTOMS: [x ]Unable to obtain due to poor mentation   Source if other than patient:  [ ]Family   [ ]Team     Pain:  [ ]yes [ ]no  QOL impact -   Location -                    Aggravating factors -  Quality -  Radiation -  Timing-  Severity (0-10 scale):  Minimal acceptable level (0-10 scale):     Dyspnea:                           [ ]Mild [ ]Moderate [ ]Severe  Anxiety:                             [ ]Mild [ ]Moderate [ ]Severe  Fatigue:                             [ ]Mild [ ]Moderate [ ]Severe  Nausea:                             [ ]Mild [ ]Moderate [ ]Severe  Loss of appetite:              [ ]Mild [ ]Moderate [ ]Severe  Constipation:                    [ ]Mild [ ]Moderate [ ]Severe    CPOT:    https://www.[facility].org/getattachment/[email or web address]-[email or web address]-[email or web address]-9246r0717q4i/Critical-Care-Pain-Observation-Tool-(CPOT)    PAIN AD Score:	  http://geriatrictoolkit.[facility]/cog/painad.pdf (Ctrl + left click to view)    Other Symptoms:  [ ]All other review of systems negative     Palliative Performance Status Version 2:      10   %      http://[facility].org/files/news/palliative_performance_scale_ppsv2.pdf  PHYSICAL EXAM:  Vital Signs Last 24 Hrs  T(C): 36.3 (25 Jan 2021 05:36), Max: 36.4 (24 Jan 2021 13:58)  T(F): 97.3 (25 Jan 2021 05:36), Max: 97.5 (24 Jan 2021 13:58)  HR: 76 (25 Jan 2021 09:10) (62 - 80)  BP: 157/82 (25 Jan 2021 05:36) (129/80 - 157/82)  BP(mean): --  RR: 18 (25 Jan 2021 05:36) (18 - 19)  SpO2: 91% (25 Jan 2021 09:10) (91% - 100%) I&O's Summary    24 Jan 2021 07:01  -  25 Jan 2021 07:00  --------------------------------------------------------  IN: 650 mL / OUT: 520 mL / NET: 130 mL      LABS:                        8.8    23.67 )-----------( 212      ( 25 Jan 2021 07:07 )             27.8   01-25    142  |  110<H>  |  42<H>  ----------------------------<  108<H>  3.9   |  23  |  1.10    Ca    8.6      25 Jan 2021 07:07  Mg     2.0     01-24    TPro  6.0  /  Alb  2.1<L>  /  TBili  0.8  /  DBili  x   /  AST  26  /  ALT  20  /  AlkPhos  93  01-24  PT/INR - ( 25 Jan 2021 07:07 )   PT: 32.1 sec;   INR: 2.89 ratio         PTT - ( 25 Jan 2021 07:07 )  PTT:34.3 sec      RADIOLOGY & ADDITIONAL STUDIES: reviewed    Protein Calorie Malnutrition Present: [ ]mild [ ]moderate [ ]severe [ ]underweight [ ]morbid obesity  https://www.andeal.org/vault/2440/web/files/ONC/Table_Clinical%20Characteristics%20to%20Document%20Malnutrition-White%20JV%20et%20al%202012.pdf    Height (cm): 154.9 (01-15-21 @ 21:24), 154.9 (01-05-21 @ 11:50)  Weight (kg): 58.5 (01-15-21 @ 21:24), 63.5 (01-05-21 @ 11:50)  BMI (kg/m2): 24.4 (01-15-21 @ 21:24), 26.5 (01-05-21 @ 11:50)    [x ]PPSV2 < or = 30%  [ ]significant weight loss [ ]poor nutritional intake [ ]anasarca   Albumin, Serum: 2.1 g/dL (01-24-21 @ 07:17)   [ ]Artificial Nutrition    REFERRALS:   [ ]Chaplaincy  [ x]Hospice  [ ]Child Life  [ ]Social Work  [ ]Case management [ ]Holistic Therapy     Goals of Care Document:  JACINTO Canela (01-19-21 @ 12:05)  Goals of Care Conversation:   Participants   · Participants  Family  · Child(john)  Dixie Schmitt    Advance Directives   · Does patient have Advance Directive  Yes  · Indicate Type  Health Care Proxy (HCP)  · Agent's Name  Dixie Schmitt  · Phone #  [phone]  · Are any of the items on the chart  med rec  · Caregiver:  no    Conversation Discussion   · Conversation  MOLST Discussed; Palliative Care Referral  · Conversation Details  Writer spoke withdtr/HCP Dixie Schmitt. Reviewed patient's medical and social history as well as events leading to patient's hospitalization. Writer discussed patient's current diagnosis (Covid,MVP,HTN,PVD,A Fib), medical condition and management, prognosis, and life expectancy. Inquired about patient's wishes regarding extent of medical care to be provided including escalation of medical care into the ICU and use of vasopressor support. In addition, the writer inquired about thoughts regarding cardiopulmnary resuscitation, artificial nutrition and hydration including use of feeding tubes and IVF, antibiotics, and further investigative studies such as blood draws and radiology. Dixie showed some insight into medical condition. All questions answered. Writer recommended she have a conversation with her mother to know her wishes at this time) Psychosocial support provided.    Location of Discussion:   Duration of Advanced Care Planning Meeting   · Time spent (in minutes)  20    Location of Discussion   · Location of discussion  Telephone    Electronic Signatures for Addendum Section:   Petr Bai) (Signed Addendum 20-Jan-2021 07:39)    I attest that the writer and palliative care team RN discussed pt's current medical condition, hospital stay, prognosis, disease trajectory, and life expectancy. (refer to ACP/GOC note from palliative care team RN for more details on conversation).    Electronic Signatures:  Petr Bai)  (Signed 20-Jan-2021 07:39)  	Co-Signer: Goals of Care Conversation, Location of Discussion  Kassidy Sky (RN)  (Signed 19-Jan-2021 12:11)  	Authored: Goals of Care Conversation, Location of Discussion      Last Updated: 20-Jan-2021 07:39 by Petr Bai)

## 2021-01-25 NOTE — DISCHARGE NOTE NURSING/CASE MANAGEMENT/SOCIAL WORK - PATIENT PORTAL LINK FT
You can access the FollowMyHealth Patient Portal offered by [facility] by registering at the following website: [email or web address]. By joining [facility]’s FollowMyHealth portal, you will also be able to view your health information using other applications (apps) compatible with our system.

## 2021-01-25 NOTE — HOSPICE CARE NOTE - CONVESATION DETAILS
Pt approved for in patient hospice care via doc to doc assessment between Dr Jean Bruce and Saroj Gonzales NP and Dr Miller  at the [facility] .  Consents signed by Pt's HCP Dixie Schmitt  and emailed to the [facility] .

## 2021-01-25 NOTE — DISCHARGE NOTE PROVIDER - CARE PROVIDER_API CALL
Tahir Lockhart (DO)  Family Medicine  [address] [place] [address], [address]  [place] [address]  Phone: [phone]  Fax: [phone]  Follow Up Time:

## 2021-02-24 NOTE — PROGRESS NOTE ADULT - PROBLEM SELECTOR PLAN 1
cardiology evaluation for rapid afib/rate control  rate controlled today  INR <2 for EGD/colon [General Appearance - Well Developed] : well developed [Normal Appearance] : normal appearance [Well Groomed] : well groomed [General Appearance - Well Nourished] : well nourished [No Deformities] : no deformities [General Appearance - In No Acute Distress] : no acute distress [Normal Conjunctiva] : the conjunctiva exhibited no abnormalities [Eyelids - No Xanthelasma] : the eyelids demonstrated no xanthelasmas [Normal Oral Mucosa] : normal oral mucosa [No Oral Pallor] : no oral pallor [No Oral Cyanosis] : no oral cyanosis [Respiration, Rhythm And Depth] : normal respiratory rhythm and effort [Exaggerated Use Of Accessory Muscles For Inspiration] : no accessory muscle use [Auscultation Breath Sounds / Voice Sounds] : lungs were clear to auscultation bilaterally [Heart Rate And Rhythm] : heart rate and rhythm were normal [Heart Sounds] : normal S1 and S2 [Arterial Pulses Normal] : the arterial pulses were normal [Edema] : no peripheral edema present [Abdomen Soft] : soft [Abdomen Tenderness] : non-tender [Abdomen Mass (___ Cm)] : no abdominal mass palpated [Abnormal Walk] : normal gait [Gait - Sufficient For Exercise Testing] : the gait was sufficient for exercise testing [Nail Clubbing] : no clubbing of the fingernails [Cyanosis, Localized] : no localized cyanosis [Petechial Hemorrhages (___cm)] : no petechial hemorrhages [Skin Color & Pigmentation] : normal skin color and pigmentation [] : no rash [No Venous Stasis] : no venous stasis [Skin Lesions] : no skin lesions [No Skin Ulcers] : no skin ulcer [No Xanthoma] : no  xanthoma was observed [Oriented To Time, Place, And Person] : oriented to person, place, and time [Affect] : the affect was normal [Mood] : the mood was normal [No Anxiety] : not feeling anxious [FreeTextEntry1] : +3/6 systolic murmur

## 2021-04-09 NOTE — PROGRESS NOTE ADULT - PROBLEM SELECTOR PROBLEM 1
Have You Had Dysport Before?: has not had dysport
Emphysema of lung
Emphysema of lung
Goals of care, counseling/discussion
Pneumonia, aspiration
Emphysema of lung
Emphysema of lung
Epistaxis

## 2021-04-29 NOTE — PHYSICAL THERAPY INITIAL EVALUATION ADULT - ADDITIONAL COMMENTS
Pt lives in a senior housing apt. Pt has no stairs to climb. Pt owns a wheeled walker and a rollator. Pt states neighbors help out as needed. No

## 2021-06-12 NOTE — H&P PST ADULT - PROBLEM SELECTOR PROBLEM 7
Detail Level: Detailed Quality 130: Documentation Of Current Medications In The Medical Record: Current Medications Documented Quality 431: Preventive Care And Screening: Unhealthy Alcohol Use - Screening: Patient screened for unhealthy alcohol use using a single question and scores less than 2 times per year Quality 226: Preventive Care And Screening: Tobacco Use: Screening And Cessation Intervention: Patient screened for tobacco use and is an ex/non-smoker Prophylactic measure

## 2021-07-11 NOTE — PROGRESS NOTE ADULT - PROBLEM SELECTOR PLAN 2
ekg done at 2011 [facility], [address] [place] [address]  07/11/21 9897 angiogram of RLE shows extensive disease and poor perfusion but given current presence of gangrene toe that is draining, will have digital amputation.  s/p toe amputation - considering hypobaric therapy afterwards with potential for further amputation if inadequate healing.  tissue culture showing MSSA sensitive to ancef, continue ancef. final surgical pathology showing OM of 2nd digit with clean margins of resection. f/u ID for reccomendations on continuing abx  blood cx and urine cx NTD  as per podiatry: stable for discharge to [facility] with f/u in 1 week to [facility]. Patient WBAT with surgical shoe  vascular/ID/podiatry following

## 2021-10-22 NOTE — ED PROVIDER NOTE - CRITICAL CARE INDICATION, MLM
If you are a smoker, it is important for your health to stop smoking. Please be aware that second hand smoke is also harmful. patient was critically ill... Patient was critically ill with a high probability of imminent or life threatening deterioration.

## 2022-01-01 NOTE — PATIENT PROFILE ADULT. - NS PRO TALK SOMEONE YN
[Normal] : normal [Today's Date] : [unfilled] [FreeTextEntry1] : EKG shows normal sinus rhythm at rate of 115 bpm with a right axis, no chamber enlargement and normal intervals. [FreeTextEntry2] : Echocardiogram demonstrates patent hogue ovale with left-to-right shunting which is considered a normal variant.  There also is a small apical muscular ventricular septal defect and trivial PDA with both left to right shunting. Otherwise, structurally normal intracardiac anatomy with normal biventricular systolic function and no pericardial effusion. no

## 2022-01-02 NOTE — PROGRESS NOTE ADULT - ATTENDING COMMENTS
I saw and examined the patient personally. Spoke with above provider regarding this case. I reviewed the above findings completely.  I agree with the above history, physical, and plan which I have edited where appropriate. 02-Jan-2022 10:51

## 2022-01-14 NOTE — ED PROVIDER NOTE - CAS EDP CONSULT WILL SEE PT
Medication is being filled for 1 time refill only due to:  due for depression/anxiety med check.    Jeannine HUANG RN  EP Triage       shortly

## 2022-02-09 NOTE — ED ADULT TRIAGE NOTE - INTERNATIONAL TRAVEL
Received refill request for atorvastatin and metformin  Last refill: 09/13/2021 #30 R-4  Last office visit: 12/20/2021  Next office visit: 04/18/2022  Last lab work: 12/20/2021 Last lipids 10/01/2018    Per  protocol medication refilled for a 5 month supply and E-prescribed to pharmacy.       No
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
## 2022-04-25 NOTE — H&P PST ADULT - PRO ANTICIPATED DISCH DISP
[facility] New Patient[facility]Consultation       Reason for Consultation: Snoring      Patient Care Team:  Eugenia John APRN as PCP - General (Nurse Practitioner)  Giancarlo Birch MD as Consulting Physician (Cardiology)  Mansoor Bailey MD as Consulting Physician (Sleep Medicine)      History of present illness:  Thank you for asking me to see your patient.  The patient is a 54 y.o. male with paroxysmal atrial fibrillation hypertension osteoporosis osteoarthritis CVA presents with concern for sleep disorder.  Overweight BMI 29.6.  No history of prior sleep study or tonsillectomy.  Patient reports snoring witnessed apneas waking up coughing/choking hypersomnia nonrestorative sleep nocturia up to 2 times a night.  No family history of sleep apnea patient is aware.  Overweight BMI 29.6.    Bedtime 12:30 AM sleep latency few hours wake time 8:30 AM sleeps around 6 hours 1-2 naps a day no rotating shifts.    Social History: On disability no tobacco alcohol or drug use 1 soda a day    Allergies:  Patient has no known allergies.    Family History: TORY no       Current Outpatient Medications:   •  apixaban (Eliquis) 5 MG tablet tablet, Take 1 tablet by mouth 2 (Two) Times a Day., Disp: 180 tablet, Rfl: 3  •  aspirin 81 MG chewable tablet, Chew 81 mg daily., Disp: , Rfl:   •  atorvastatin (LIPITOR) 40 MG tablet, TAKE 1 TABLET BY MOUTH EVERY NIGHT AT BEDTIME., Disp: 90 tablet, Rfl: 2  •  dilTIAZem (CARDIZEM) 60 MG tablet, TAKE 1 TABLET TWICE DAILY, Disp: 180 tablet, Rfl: 3  •  escitalopram (LEXAPRO) 20 MG tablet, Take 1 tablet by mouth Daily., Disp: 90 tablet, Rfl: 1  •  levETIRAcetam (KEPPRA) 250 MG tablet, Take 1 tablet (250 mg total) by mouth 2 (two) times a day for 14 days, THEN 2 tablets (500 mg total) 2 (two) times a day., Disp: , Rfl:   •  metoprolol tartrate (LOPRESSOR) 50 MG tablet, TAKE 1 TABLET TWICE DAILY, Disp: 180 tablet, Rfl: 3    Vital Signs:    Vitals:    04/25/22 1440   BP: 135/86   BP Location:  "Left arm   Patient Position: Sitting   Cuff Size: Adult   Pulse: 98   SpO2: 96%   Weight: 93.4 kg (206 lb)   Height: 177.8 cm (70\")      Body mass index is 29.56 kg/m².         REVIEW OF SYSTEMS.  Full review of systems available on the intake form which is scanned in the media tab.  The relevant positive are noted below  1. Daytime excessive sleepiness with [place] Sleepiness Scale :Total score: 8   2. Snoring  3. Irregular heartbeat      Physical exam:  Vitals:    04/25/22 1440   BP: 135/86   BP Location: Left arm   Patient Position: Sitting   Cuff Size: Adult   Pulse: 98   SpO2: 96%   Weight: 93.4 kg (206 lb)   Height: 177.8 cm (70\")    Body mass index is 29.56 kg/m².    HEENT: Head is atraumatic, normocephalic  Eyes: pupils are round equal and reacting to light and accommodation, conjunctiva normal  Throat: tongue large, oral airway Mallampati class 2-3  NECK: , trachea is in the midline, thyroid not enlarged  RESPIRATORY SYSTEM: Regular respirations  CARDIOVASULAR SYSTEM: Regular rate  EXTREMITES: No cyanosis, clubbing  NEUROLOGICAL SYSTEM: Oriented x 3      Impression:  1. Sleep apnea, unspecified type    2. Hypersomnia    3. Non-restorative sleep    4. Snoring    5. Overweight with body mass index (BMI) 25.0-29.9    6. Paroxysmal atrial fibrillation (HCC)        Plan:    Good sleep hygiene measures should be maintained.  Weight loss would be beneficial in this patient who is overweight BMI 29.6.    I discussed the pathophysiology of obstructive sleep apnea with the patient.  We discussed the adverse outcomes associated with untreated sleep-disordered breathing.  We discussed treatment modalities of obstructive sleep apnea including CPAP device as well as oral mandibular advancement device. Sleep study will be scheduled to establish definitive diagnosis of sleep disorder breathing.  Weight loss will be strongly beneficial in order to reduce the severity of sleep-disordered breathing.  Patient has narrow " oropharyngeal structure.  Caution during activities that require prolonged concentration is strongly advised.  Patient will be notified of sleep study results after sleep study is completed.  If sleep apnea is only mild,  oral mandibular advancement device may be one of the treatment options.  However if sleep apnea is moderately severe, CPAP treatment will be strongly encouraged.  The patient is not opposed to treatment with CPAP device if we confirm significant obstructive sleep apnea on polysomnography.     Thank you for allowing me to participate in your patient's care.    Mansoor Bailey MD  Sleep Medicine  04/25/22  14:56 EDT       home

## 2022-05-03 NOTE — ED PROVIDER NOTE - ASSOCIATED SYMPTOMS
Dr Decker called Annalee and discussed issue.  
Patient called and states that she is supposed to have a pic line put in tomorrow 5/3/22. When patient was in office today 5/3/22 she discussed with Dr. Decker that pic line should be placed in left arm and to call Dr. Loya office. Patient states that she just got a call from [facility] and they said they were going to put it in her chest.  Patient is wanting to know if Dr. Decker can call over to [place] [facility] and \"tell them to put the pic line in my left arm\". Patient reports that her appointment is 5/4/22 @ 8 am.    Annalee # [phone]   
see above

## 2022-06-03 NOTE — ED ADULT NURSE NOTE - NSFALLRSKOUTCOME_ED_ALL_ED
[facility] Adult  Hospitalist Group                                                                                          Hospitalist Progress Note  Cait Steward MD  Answering service: [phone] from in house phone        Date of Service:  6/3/2022  NAME:  Soha Sequeira  :  1966  MRN:  [id number]      Admission Summary:     Patient  presents with sepsis due to lower extremity cellulitis, on initial presentation patient was admitted to ICU and require low-dose of norepinephrine overnight for short period of time. Later patient was transferred out of ICU. Currently followed by ID. Patient also with acute metabolic encephalopathy and generalized fatigue, overall mental status is better. Interval history / Subjective:     Patient is doing well no acute complaint.    Patient told me that he cannot tolerate CPAP  Pt awaiting Auth for [place]      Assessment & Plan:     Severe sepsis with septic shock-resolved  -Sepsis due to lower extremity cellulitis  -completed antibiotics cefepime and daptomycin  -Patient initially required ICU admission and vasopressors temporarily  -Sepsis is now resolved  and hemodynamically stable, blood cultures no growth     Lower extremity cellulitis- completed abx   -Patient with chronic lower extremity edema, culprit for cellulitis  -Lower extremity venous Doppler negative for DVT  -ID following, was on cefepime and daptomycin, completed total of 2 weeks of antibiotics     Chronic leg edema with stasis ulcers  -Continue mobility with PT, keep legs antigravity while sitting  -Patient received bariatric bed   -Wound care following  -Ordered for both lower extremities to be wrapped with Ace bandage to provide compression effect  -Patient still need to continue on IV diuretics, patient is diuresing well     Acute on chronic diastolic CHF NYHA 2  -NYHA class III on admission  -Continue diuresis with Bumex, continue Entresto (unclear if patient's EF was low previously, no documentations available), echo on 9/20/2021 shows EF of 50%     Hematuria--resolved  -Patient with hematuria and urinary retention  -Now resolved, Foster was removed on 5/22/2022  -Urology previously evaluated the patient     MADELAINE  -Now resolved, stable on diuretics     Acute metabolic encephalopathy- resolved   -ABGs previously was unremarkable  -Likely possible hospital fatigue, overall improving  -Patient to continue to work with physical therapy     Chronic atrial fibrillation  -Continue amiodarone and Eliquis for anticoagulation  -Stable     COPD  -Patient with chronic hypoxic and hypercapnic respiratory failure due to COPD  -Continue bronchodilator, breathing treatment as needed  -On 3 L of oxygen at baseline     Diabetes type 2  -Continue NPH 5 units twice daily, continue sliding scale coverage     Dyslipidemia  -Continue Zetia     BPH  -Continue Flomax     Obstructive sleep apnea  -CPAP nightly     Morbid obesity  -With BMI of 38.78  -Outpatient follow-up for weight management     Code status:  Full  Prophylaxis: Eliquis  Care Plan discussed with: Patient  Anticipated Disposition:Patient needs placement to [facility] Problems  Date Reviewed: 4/28/2013          Codes Class Noted POA    * (Principal) Sepsis ([facility] [address] ICD-10-CM: A41.9  ICD-9-CM: 038.9, 995.91  5/18/2022 Unknown                Review of Systems:   A comprehensive review of systems was negative except for that written in the HPI. Vital Signs:    Last 24hrs VS reviewed since prior progress note.  Most recent are:  Visit Vitals  /63 (BP 1 Location: Right upper arm, BP Patient Position: At rest;Sitting)   Pulse 73   Temp 98.5 °F (36.9 °C)   Resp 11   Ht 6' (1.829 m)   Wt 148.3 kg (327 lb)   SpO2 100%   BMI 44.35 kg/m²         Intake/Output Summary (Last 24 hours) at 6/3/2022 1018  Last data filed at 6/3/2022 0904  Gross per 24 hour   Intake --   Output 2760 ml   Net -2760 ml        Physical Examination:     I had a face to face encounter with this patient and independently examined them on 6/3/2022 as outlined below:          Constitutional:  No acute distress, cooperative, pleasant    ENT:  Oral mucosa moist, oropharynx benign. Resp:  CTA bilaterally. No wheezing/rhonchi/rales. No accessory muscle use. CV:  Regular rhythm, normal rate, no murmurs, gallops, rubs    GI:  Soft, non distended, non tender. normoactive bowel sounds, no hepatosplenomegaly     Musculoskeletal:  Pitting edema bilateral lower extremities, warm, 2+ pulses throughout wrapped in bandage     Neurologic:  Moves all extremities. AAOx3, CN II-XII reviewed            Data Review:    Review and/or order of clinical lab test      Labs:     Recent Labs     06/03/22  0425   WBC 6.9   HGB 11.8*   HCT 39.1        Recent Labs     06/02/22  0502 06/01/22  0321   * 132*   K 4.0 3.7   CL 94* 92*   CO2 32 36*   BUN 64* 73*   CREA 0.98 1.05   * 144*   CA 9.4 9.3     No results for input(s): ALT, AP, TBIL, TBILI, TP, ALB, GLOB, GGT, AML, LPSE in the last 72 hours. No lab exists for component: SGOT, GPT, AMYP, HLPSE  No results for input(s): INR, PTP, APTT, INREXT, INREXT in the last 72 hours. No results for input(s): FE, TIBC, PSAT, FERR in the last 72 hours. No results found for: FOL, RBCF   No results for input(s): PH, PCO2, PO2 in the last 72 hours. No results for input(s): CPK, CKNDX, TROIQ in the last 72 hours.     No lab exists for component: CPKMB  Lab Results   Component Value Date/Time    Cholesterol, total 170 01/23/2013 11:08 AM    HDL Cholesterol 37 (L) 01/23/2013 11:08 AM    LDL, calculated 97 01/23/2013 11:08 AM    Triglyceride 180 (H) 01/23/2013 11:08 AM    CHOL/HDL Ratio 6.0 (H) 10/06/2010 03:18 PM     Lab Results   Component Value Date/Time    Glucose (POC) 121 (H) 06/03/2022 09:02 AM    Glucose (POC) 186 (H) 06/02/2022 09:26 PM    Glucose (POC) 119 (H) 06/02/2022 04:21 PM    Glucose (POC) 207 (H) 06/02/2022 12:12 PM Glucose (POC) 121 (H) 06/02/2022 09:08 AM     Lab Results   Component Value Date/Time    Color YELLOW/STRAW 05/18/2022 03:54 PM    Appearance CLEAR 05/18/2022 03:54 PM    Specific gravity 1.011 05/18/2022 03:54 PM    pH (UA) 5.0 05/18/2022 03:54 PM    Protein Negative 05/18/2022 03:54 PM    Glucose >1,000 (A) 05/18/2022 03:54 PM    Ketone Negative 05/18/2022 03:54 PM    Bilirubin Negative 05/18/2022 03:54 PM    Urobilinogen 0.2 05/18/2022 03:54 PM    Nitrites Negative 05/18/2022 03:54 PM    Leukocyte Esterase Negative 05/18/2022 03:54 PM    Epithelial cells FEW 05/18/2022 03:54 PM    Bacteria Negative 05/18/2022 03:54 PM    WBC 0-4 05/18/2022 03:54 PM    RBC 20-50 05/18/2022 03:54 PM         Medications Reviewed:     Current Facility-Administered Medications   Medication Dose Route Frequency    ezetimibe (ZETIA) tablet 10 mg  10 mg Oral DAILY    sacubitriL-valsartan (ENTRESTO) 49-51 mg tablet 1 Tablet  1 Tablet Oral BID    bumetanide (BUMEX) injection 2 mg  2 mg IntraVENous BID    magnesium citrate solution 148 mL  148 mL Oral Q2H PRN    senna-docusate (PERICOLACE) 8.6-50 mg per tablet 1 Tablet  1 Tablet Oral BID    insulin NPH (NOVOLIN N, HUMULIN N) injection 5 Units  5 Units SubCUTAneous ACB&D    albuterol-ipratropium (DUO-NEB) 2.5 MG-0.5 MG/3 ML  3 mL Nebulization Q6H PRN    traMADoL (ULTRAM) tablet 50 mg  50 mg Oral Q6H PRN    polyethylene glycol (MIRALAX) packet 17 g  17 g Oral BID    albuterol (PROVENTIL VENTOLIN) nebulizer solution 2.5 mg  2.5 mg Nebulization Q4H PRN    insulin lispro (HUMALOG) injection   SubCUTAneous AC&HS    pregabalin (LYRICA) capsule 75 mg  75 mg Oral BID    acetaminophen (TYLENOL) tablet 1,000 mg  1,000 mg Oral Q6H PRN    Or    acetaminophen (TYLENOL) suppository 650 mg  650 mg Rectal Q6H PRN    pantothenic ac-min oil-pet,hyd (AQUAPHOR) 41 % ointment   Topical DAILY    sodium chloride (NS) flush 5-40 mL  5-40 mL IntraVENous Q8H    sodium chloride (NS) flush 5-40 mL 5-40 mL IntraVENous PRN    glucose chewable tablet 16 g  4 Tablet Oral PRN    glucagon (GLUCAGEN) injection 1 mg  1 mg IntraMUSCular PRN    dextrose 10 % infusion 0-250 mL  0-250 mL IntraVENous PRN    sodium chloride (NS) flush 5-40 mL  5-40 mL IntraVENous Q8H    sodium chloride (NS) flush 5-40 mL  5-40 mL IntraVENous PRN    ondansetron (ZOFRAN ODT) tablet 4 mg  4 mg Oral Q8H PRN    Or    ondansetron (ZOFRAN) injection 4 mg  4 mg IntraVENous Q6H PRN    bisacodyL (DULCOLAX) tablet 5 mg  5 mg Oral DAILY    polyethylene glycol (MIRALAX) packet 17 g  17 g Oral DAILY PRN    amiodarone (CORDARONE) tablet 100 mg  100 mg Oral DAILY    apixaban (ELIQUIS) tablet 5 mg  5 mg Oral BID    aspirin delayed-release tablet 81 mg  81 mg Oral DAILY    arformoterol 15 mcg/budesonide 0.5 mg neb solution   Nebulization BID RT    montelukast (SINGULAIR) tablet 10 mg  10 mg Oral QHS    pantoprazole (PROTONIX) tablet 40 mg  40 mg Oral DAILY    [Held by provider] rosuvastatin (CRESTOR) tablet 5 mg  5 mg Oral DAILY    tamsulosin (FLOMAX) capsule 0.4 mg  0.4 mg Oral DAILY     ______________________________________________________________________  EXPECTED LENGTH OF STAY: 4d 19h  ACTUAL LENGTH OF STAY:          Jose L Hernandez MD Universal Safety Interventions

## 2022-07-14 NOTE — PROGRESS NOTE ADULT - SUBJECTIVE AND OBJECTIVE BOX
VASCULAR SURGERY PA NOTE:    S: Patient seen and examined at bedside.   With regard to RLE, patient is without complaints at this time.      MEDICATIONS:  acetaminophen   Tablet .. 650 milliGRAM(s) Oral every 6 hours PRN  aspirin enteric coated 81 milliGRAM(s) Oral daily  atorvastatin 40 milliGRAM(s) Oral at bedtime  azithromycin  IVPB      azithromycin  IVPB 500 milliGRAM(s) IV Intermittent every 24 hours  diltiazem    Tablet 60 milliGRAM(s) Oral every 6 hours  gabapentin 100 milliGRAM(s) Oral three times a day  metoprolol tartrate 12.5 milliGRAM(s) Oral every 6 hours  multivitamin 1 Tablet(s) Oral daily  pantoprazole    Tablet 40 milliGRAM(s) Oral two times a day  piperacillin/tazobactam IVPB.. 3.375 Gram(s) IV Intermittent every 8 hours  sodium chloride 0.9%. 1000 milliLiter(s) IV Continuous <Continuous>  warfarin 2.5 milliGRAM(s) Oral once      O:  Vital Signs Last 24 Hrs  T(C): 37 (07 Jan 2021 07:29), Max: 37.4 (06 Jan 2021 20:49)  T(F): 98.6 (07 Jan 2021 07:29), Max: 99.3 (06 Jan 2021 20:49)  HR: 74 (07 Jan 2021 07:29) (74 - 104)  BP: 135/76 (07 Jan 2021 07:29) (135/76 - 160/91)  BP(mean): --  RR: 18 (07 Jan 2021 07:29) (18 - 18)  SpO2: 92% (07 Jan 2021 07:29) (91% - 96%)    I&O SUMMARY:    01-06-21 @ 07:01  -  01-07-21 @ 07:00  --------------------------------------------------------  IN: 0 mL / OUT: 550 mL / NET: -550 mL        PHYSICAL EXAM:  Lungs: CTA bilat without W/R/R  Card: S1S2  Abd: Soft, ND.  Some minimal tenderness to deep palpation to upper abdominal quadrants. +BS x 4.  No rebound/guarding.    LE exam: Bilat LE warm, with good skin color and turgor.  Palpable, 2+ femoral pulses bilat.  Sensation to light touch intact throughout both lower extremities.    Right foot: Non-palpable DP/PT pulses.  +Biphasic PT signal detected with portable doppler, unable to appreciate DP.   Ext: Calves soft, NT, without edema bilat    LABS:                        10.3   9.14  )-----------( 177      ( 07 Jan 2021 08:02 )             33.5     01-07    144  |  112<H>  |  26<H>  ----------------------------<  89  4.6   |  24  |  1.20    Ca    8.2<L>      07 Jan 2021 08:02  Phos  2.5     01-06  Mg     1.9     01-06    TPro  6.0  /  Alb  2.6<L>  /  TBili  2.4<H>  /  DBili  x   /  AST  19  /  ALT  19  /  AlkPhos  100  01-07    PT/INR - ( 07 Jan 2021 08:02 )   PT: 18.8 sec;   INR: 1.65 ratio         PTT - ( 06 Jan 2021 08:08 )  PTT:37.4 sec    A: 84yo F with extensive PMHx with incidental finding of 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    P: No acute vascular intervention indicated at this time, as patient remains asymptomatic with regard to the right leg      Would consider further workup (vascular studies) and possible intervention when patient medically optimized       Continue coumadin/ASA 81 for a-fib       Continue care per primary team             No

## 2022-08-19 NOTE — ED PROVIDER NOTE - WR ORDER ID 1
[id number] Complex Repair And Modified Advancement Flap Text: The defect edges were debeveled with a #15 scalpel blade.  The primary defect was closed partially with a complex linear closure.  Given the location of the remaining defect, shape of the defect and the proximity to free margins a modified advancement flap was deemed most appropriate for complete closure of the defect.  Using a sterile surgical marker, an appropriate advancement flap was drawn incorporating the defect and placing the expected incisions within the relaxed skin tension lines where possible.    The area thus outlined was incised deep to adipose tissue with a #15 scalpel blade.  The skin margins were undermined to an appropriate distance in all directions utilizing iris scissors.

## 2022-11-09 NOTE — ED ADULT NURSE NOTE - CHPI ED NUR QUALITY2
Postpartum Progress Note     Subjective:     Patient seen and examined at bedside this AM.  No acute events overnight. Patient resting comfortably and pain is controlled w/ meds. Tolerating diet without N/V. Pt ambulating without difficulty, + spont void, + flatus, + BM. Lochia appropriate. Baby is in NICU, on ventilator with continued oxygen requirements. Pt is pumping. Denies headache, vision changes, dyspnea, RUQ pain. No other complaints.      Objective:      Vitals:    22 0419   BP: 137/88   Pulse: 68   Resp: 16   Temp: 97.7 °F (36.5 °C)       General:  NAD   CV  RRR   PULM   normal inspiratory and expiratory effort, no wheezing or cough   ABD  Soft, appropriately tender to palpation, fundus firm below umbilicus   INC  C/D/I    EXT   Trace B/L LE edema, nontender to palpation     WBC (K/mcL)   Date Value   2022 17.9 (H)     RBC (mil/mcL)   Date Value   2022 4.62     HCT (%)   Date Value   2022 28.7 (L)     HGB (g/dL)   Date Value   2022 8.1 (L)     PLT (K/mcL)   Date Value   2022 331       Assessment/Plan:     A/P: 29 y.o.  s/p classical CS , POD #4    #Routine Postpartum   - FEN: General diet, HLIV   - Pain: Tylenol, Motrin, Oxy, Toradol PRN  - GI: Cont stool softeners, Simethicone, Zofran PRN   - Heme: Hbg 10.4 on admit -->  --> PP Hgb 8.9  - : Voiding w/o difficulty  - PPx: Increase activity and ambulation; Rh pos, no Rhogam indicated   - Jeremi: male infant, in NICU  - Psych: moods appropriate  - Contraception: OCPs at 6wks       #Classical CS  -Pt counseled about future pregnancies and the need for RCS at 37 wga     #PreE w SF  - IVP : L20/L40/L80/H10/L20, 11/3 H5 ,  H5, H10  - L400/L200/L400   - s/p mag/pit x24h (finshed  0300)  - 11/3: 18.7>10.1<407, Cr 0.76, A/A    - : Cr 0.78, A/A 46/52, Plts 312  - : Cr 0.58, A/A 38/79, Plts 289  - : Cr 0.64, A/A 27/31, Plts 332    Will d/w AM resident team at OB rounds, attending  attestation to follow.     Varsha Marte MD  OB/Gyn Resident, PGY-1  11/09/22  6:05 AM     MD Ob/Gyn In House Note    Appreciate R1 note.  Patient was seen, examined and care discussed.  Incision is healing well.  Pain is well managed.   Patient desires OCPs for birth control and will start at 6 week PP visit.  Incision check in 1-2 weeks.  Patient is breast feeding.  Discharge home.  Scripts on chart.     aching

## 2022-12-02 NOTE — ED PROVIDER NOTE - NSSUBSTANCEUSE_GEN_ALL_CORE_SD
Deatorie Franks,  Today you were seen for bacterial vaginosis.     It is common for bacterial vaginosis to recur with in 3-12 months even with treatment.    Complete the entire course of treatment. Stopping treatment early may increase the risk of reoccurrence.       -Consider use of a probiotic like, Florajen 3 and Refresh ProB as these can help        increase the number of good bacteria.   -Avoid vaginal douches or cleansers.   -Use mild non-perfumed soaps.   -Use unscented tampons or pads.   -If your partner is a male, he does not need treatment but a female partner should be     evaluated for treatment as bacterial vaginosis can spread between female sexual      partners.     Having Bacterial vaginosis makes women more at risk for sexual transmitted infections, always use a condom to avoid the spread of sexually transmitted infections.       If there is no improvement of symptoms, rash or lesions to the vaginal area, pelvic pain or fever, in-person evaluation should be completed.    If you do not have a primary care provider or have any questions about your visit, please call the [facility] RN at [phone].     If you have any billing concerns, please contact our Billing Department at [phone]. Hours: Mon. - Thu. 7:30 am - 6 pm and Friday 7:30 pm to 5 pm.     never used

## 2023-01-01 NOTE — H&P PST ADULT - AS BP NONINV METHOD
Music Therapy Missed Visit Note    Attempted visit with Female-Kylie Montero. Patient sleeping 2x. Music therapist to attempt visit again this week.    Cindy Grissom MT-BC  Music Therapist  [email or web address]  ASCOM: [id number]       electronic

## 2023-01-24 NOTE — ED PROVIDER NOTE - OBJECTIVE STATEMENT
81 yo white female with few days of worsening redness, warmth and pain to right second toe. No fever or chills and nausea/vomiting or diarrhea. No trauma to toes or foot Quinolones Counseling:  I discussed with the patient the risks of fluoroquinolones including but not limited to GI upset, allergic reaction, drug rash, diarrhea, dizziness, photosensitivity, yeast infections, liver function test abnormalities, tendonitis/tendon rupture.

## 2023-02-03 NOTE — ED PROVIDER NOTE - NSTIMEPROVIDERCAREINITIATE_GEN_ER
Date:2/3/2023         Procedure Performed: lumbar interlaminar riana L5-S1     Preoperative Diagnoses: Lumbar spinal stenosis, lumbar radiculopathy and degenerative disc disease L4-5 L5 is 1     Postoperative Diagnoses:  same      Surgeon:  Lowell Manuel DO      Anethesia:  Local    Description of Procedure:  After obtaining an informed written consent, patient was brought to the procedure room and placed prone on the procedure room table. Monitors were applied, and the patient was monitored continuously throughout the procedure.  The back was prepped and draped in the usual sterile fashion.  The lumbar vertebrae were identified by fluoroscopy.  L4-5 could not be entered because of the severe degenerative disc closing the interlaminar epidural space.  However contrast was going up to the level of L4-5 when I entered at L5-S1.  The fluoroscope was rotated craniocaudad to square the vertebral endplates.  The  correct level  was identified by fluoroscopy.   The skin overlying the target point was infiltrated with 1% plain preservative free lidocaine using a 25-gauge 1.5-inch needle.  A 20 gauge  3.5  inch epidural needle was  was inserted to the skin and advanced under fluoroscopic guidance until entered the epidural space on the lateral the view.  The needle was  advanced into the interlaminar space  The needle position was confirmed  by the following: loss of resistance , no withdrawal of csf and there was an epidurogram seen under flouro on the lateral view and AP view under direct flouro imaging using 2 ccm of isovue contrast. After confirming negative aspiration for blood and cerebrospinal fluid,  a test dose of 2 cc of 1% lidocaine was sued and there was no spinal anesthesia after 60 seconds.  Next   15 mg decadron  was injected slowly with 2 cc of 1% lidocaine. The patient tolerated the procedure well.  No complications were noted.  The patient was then taken to the recovery room.  Vital signs remained  stable during the procedure.      Follow-up Instructions:  The patient was observed in the recovery room for about 10 minutes after the procedure.  Vital signs were obtained and oral fluids were given. Post operative instruction were given and the patient  understood them.  The patient is to follow up in two weeks.        08-Jun-2019 10:20

## 2023-02-10 NOTE — PROGRESS NOTE ADULT - PROBLEM SELECTOR PLAN 6
Lilli Banks is a 53 year old female presenting for consult of parathyroid adenoma.    Denies known Latex allergy or symptoms of Latex sensitivity.  Medications reviewed and updated.     Chronic, stable  -Continue metoprolol, diltiazem, and hydralazine 25mg TID with hold parameters  -DASH/TLC diet  -routine hemodynamic monitoring

## 2023-02-13 NOTE — PATIENT PROFILE ADULT - NSTRANSFERBELONGINGSRESP_GEN_A_NUR
Continued Stay Note  [facility]     Patient Name: Cara Garvey  MRN: [id number]  Today's Date: 2/13/2023    Admit Date: 2/7/2023    Plan: [place] [facility]- bed available 2/15   Discharge Plan     Row Name 02/13/23 1728       Plan    Plan Comments Spoke with Chiquis/Mangoy and [facility] will now not be able to accept.  She will follow up with [facility] tomorrow AM for other options. Ellen WINSTON RN    Row Name 02/13/23 1717       Plan    Plan [place] [facility]- bed available 2/15    Plan Comments Spoke with Chiquis/Ester.  Bed at [facility] will not be available until Wednesday.  [facility] arranged for 2/15 @ 1300. Ellen WINSTON RN    Row Name 02/13/23 1559       Plan    Plan [place] [facility]- bed available 2/14    Patient/Family in Agreement with Plan yes    Plan Comments Spoke with Chiquis/Ester.  [place] [facility] has a bed and can accept tomorrow.  Spoke with daughter, Concha, at bedside and she is agreeable.  [facility] arranged for 2/14 @ 1300. Ellen WINSTON RN               Discharge Codes    No documentation.               Expected Discharge Date and Time     Expected Discharge Date Expected Discharge Time    Feb 14, 2023             Ellen Guevara RN     yes

## 2023-02-17 NOTE — BRIEF OPERATIVE NOTE - SURGICAL START DATE/TIME
PRINCIPAL DISCHARGE DIAGNOSIS  Diagnosis: Bilateral lower leg cellulitis  Assessment and Plan of Treatment: complete doxycycline for another 9 days, follow up ID      SECONDARY DISCHARGE DIAGNOSES  Diagnosis: DM (diabetes mellitus)  Assessment and Plan of Treatment: Insulin glargin with premeal and moderate sliding scale coverage, monitor FS    Diagnosis: Afib  Assessment and Plan of Treatment: rate controlled , c/w home regiment    Diagnosis: Diabetic ulcer of left foot  Assessment and Plan of Treatment: wound care as instructed    Diagnosis: CHF, chronic  Assessment and Plan of Treatment: lasis, f/u cardiology    
09-Nov-2017 12:57

## 2023-03-09 NOTE — PROGRESS NOTE ADULT - PROBLEM SELECTOR PROBLEM 2
08-Mar-2023 15:00
Pneumonia of both lungs due to infectious organism, unspecified part of lung
Pneumonia of both lungs due to infectious organism, unspecified part of lung

## 2023-06-07 NOTE — ED PROVIDER NOTE - PRINCIPAL DIAGNOSIS
Pt states that she lives 1 [place]/2 hours away, with family due to not being able to stay in her home currently. She has limited ability to get to any appointments. Pt is requesting \"water pill\" to be sent to pharmacy. Please advise.  Thanks [employer]

## 2023-06-12 NOTE — PROGRESS NOTE ADULT - ASSESSMENT
"   06/12/23 0948   OTHER   Discipline physical therapist   Rehab Time/Intention   Session Not Performed patient/family declined evaluation  (pt yelling \"get out, go get me a beer\" upon entering the room.  pt attempting to remove pulse ox and adamantly refuses to work with therapy despite encouragement.  Discussed with RN and charge RN.)       " 82F PMHx Afib on Coumadin, HLD, CVA, MVP, OA, HTN, PVD (s/p RLE bypassx3, most recently 2012), Neuropathy, GERD presents with pain and swelling to right second toe a/w RLE cellulitis, r/o osteomyelitis. Found to have symptomatic anemia     -Patient still in SR, continue Cardizem 60mg q6 and metoprolol 100 BID. HR and BP acceptable.  -No evidence of acute ischemia or meaningful volume overload  -Extensive history of PVD and stenotic valvular disease. Now with gangrene. s/p toe amputation yesterday. Seems to have tolerated procedure well.  -Monitor H/H, currently stable, transfuse as needed. Plan to resume AC after toe/leg amputation.   -TTE (Nov 4/17): Technically limited study. Normal left ventricular size and systolic function, estimated LVEF of 65%. Grade 1 diastolic dysfunction. Normal biatrial size. The mitral valve is structurally normal, 1+ MR.  -Monitor electrolytes, keep K>4, Mg>2  -Will continue to follow
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
## 2023-10-10 NOTE — ED ADULT NURSE NOTE - NS ED NURSE PATIENT LEFT UNIT TIME
Render Risk Assessment In Note?: no Detail Level: Simple Additional Notes: Recommended supplementation with Niacinamide 500mg BID Patient Management Risk Assessment: Low Additional Notes: ***\\n\\nBiopsy proven BCC on 12/2022\\n\\nPatient wasn't able to return for surgical treatment as planned due to idalmis COVID. She treated site with 5%-FU cream.\\n\\nEvidence of residual BCC present, 1mm in size clinically. Advised to reuse 5%-FU twice daily for one month. Additional Notes: ***\\n\\nRecommend treating right dorsal hand and bilateral calves broadly with 5%-FU QD for two weeks. Additional Notes: ***\\nWart vs Hypertrophic AK\\n\\n-Will treat lesion topically with 5%-FU twice daily for one month. 08:17

## 2023-10-11 NOTE — ED ADULT NURSE NOTE - CONTEXT
unknown Modified Advancement Flap Text: The defect edges were debeveled with a #15 scalpel blade. Given the location of the defect, shape of the defect and the proximity to free margins a modified advancement flap was deemed most appropriate. Using a sterile surgical marker, an appropriate advancement flap was drawn incorporating the defect and placing the expected incisions within the relaxed skin tension lines where possible. The area thus outlined was incised deep to adipose tissue with a #15 scalpel blade. The skin margins were undermined to an appropriate distance in all directions utilizing iris scissors. Following this, the designed flap was advanced and carried over into the primary defect and sutured into place.

## 2023-11-13 NOTE — PROGRESS NOTE ADULT - PROBLEM SELECTOR PROBLEM 2
Instructions: This plan will send the code FBSE to the PM system.  DO NOT or CHANGE the price. Detail Level: Simple Price (Do Not Change): 0.00 Biliary stone

## 2023-11-13 NOTE — ED ADULT TRIAGE NOTE - NS ED NURSE BANDS TYPE
I called the patient to remind him of his appointment with the pharmacist tomorrow at 1100; he plans on attending. The patient stated he was walking to the post office to return his Zio patch. I will continue to follow. Name band;

## 2023-11-29 NOTE — ED ADULT NURSE NOTE - NS ED NURSE REPORT GIVEN TO FT
Up In Physical Therapy          Insurance:  Payor:  EMPLOYEE MEDICAL PLAN / Plan:  EMPLOYEE MEDICAL PLAN CONSUMER SELECT / Product Type: *No Product type* /   Number of Treatments Authorized: 9/11          Subjective   General  Reason for Referral: L knee pain s/p L knee scope chondroplasty of thochlea and medial femoral condyle  Referred By: Jose Rafael  Past Medical History Relevant to Rehab: anxiety, HTN, depression  General Comment: Patient states that her knee has been feeling okay. Not much of a change. Felt unstable with walking on the ice yesterday.    Performing HEP?: Yes    Precautions  Precautions  LE Weight Bearing Status: Weight Bearing as Tolerated  Pain  Pain Assessment: 0-10  Pain Score: 4  Pain Location: Knee  Pain Orientation: Lower, Left  Pain Descriptors: Aching    Objective       General Observation  General Observation: TKE      Treatments:    Therapeutic Exercise  Therapeutic Exercise Activity 1: Upright bike x 6 min seat 5 for extension  Therapeutic Exercise Activity 2: Leg press 40# x 10, 50# 3 x 15  Therapeutic Exercise Activity 3: Eccentric SL stand to sit 1 pad 2 x 10  Therapeutic Exercise Activity 4: TKE with ball x 20  Therapeutic Exercise Activity 5: LAQ with therapist resistance x20  Therapeutic Exercise Activity 6: Prone femoral nerve floss x 10         Manual Therapy  Manual Therapy Activity 1: IASTM to L quadriceps extended and flexed with STM following. Prone IASTM to gastroc STM following        Assessment:  PT Assessment  PT Assessment Results: Decreased strength  Assessment Comment: Improved motor control with leg extension on leg press and mild quads. However patient continues to require cues for terminal knee extension with  Goals:  Active       PT Problem       Patient will be independent with HEP (Met)       Start:  09/25/23    Expected End:  09/28/23    Resolved:  10/17/23         Patient will decrease pain to 0/10 with rest and with activity  (Progressing)       Start:  09/25/23    Expected End:  10/05/23            Patient will increase strength of B LE to 5/5 to improve balance and walking mechanics  (Progressing)       Start:  09/25/23    Expected End:  10/05/23            Patient will increase ROM of L knee to equal that of the R for improved stair negotiation.  (Progressing)       Start:  09/25/23    Expected End:  10/05/23            Patient will ambulate for >1 hour, no AD, negotiating obstacles, making turns, on uneven surfaces ascending/descending > 15 stairs with a reciprocal pattern without increased pain. (Progressing)       Start:  09/25/23    Expected End:  10/05/23            Patient will perform SLS >/=10 seconds on L LE to be at decreased risk for falls.  (Progressing)       Start:  09/25/23    Expected End:  10/05/23            Patient will increase LEFS score by 9 points to meet MCID in  (Progressing)       Start:  09/25/23    Expected End:  10/05/23            patient will jog for >10 minutes without increased pain. (Not Progressing)       Start:  09/25/23    Expected End:  10/19/23                patient will perform >10 DL squats with > 20lb with proper mechanics without increased pain to return to normal exercise activity.  (Progressing)       Start:  09/25/23    Expected End:  10/19/23                patient will increase LEFS score to >/=75/80.  (Progressing)       Start:  09/25/23    Expected End:  10/19/23                     Bienvenido Tran, PT   Branden GARCIA in ICU

## 2024-01-11 NOTE — PATIENT PROFILE ADULT. - IS PATIENT PREGNANT?
Group Therapy Note    Date: 1/11/2024    Group Start Time: 0940  Group End Time: 1010  Group Topic: Education Group - Inpatient    SSR 2  NON ACUTE    Elvira Douglas        Group Therapy Note    Facilitated group to focus on “exploring values “and “utilized values discussion questions       Attendees: 1/5    Notes: Encouraged but did not attend    Discipline Responsible: Recreational Therapist      Signature:  JOEY Gentile     no

## 2024-01-18 NOTE — H&P ADULT - NSHPSOCIALHISTORY_GEN_ALL_CORE
[facility] Behavior[facility]  January 18, 2024      Behavioral Health Clinician Progress Note    Patient Name: Emely Forbes           Service Type:  Individual      Service Location:   Face to Face in Clinic     Session Start Time: 12:28 pm  Session End Time: 1:30 pm      Session Length: 53 - 60      Attendees: Patient     Service Modality:  In-person    Visit Activities (Refresh list every visit): [facility] Only    Diagnostic Assessment Date: 1/11/24  Treatment Plan Review Date: 4/18/24  See Flowsheets for today's PHQ-9 and SOILA-7 results  Previous PHQ-9:        No data to display              Previous SOILA-7:       1/11/2024     3:10 PM   SOILA-7 SCORE   Total Score 11 (moderate anxiety)   Total Score 11       OSMEL LEVEL:      9/5/2012     9:00 AM 12/2/2013     9:00 AM   OSMEL Score (Last Two)   OSMEL Raw Score 39 52   Activation Score 56.4 100   OSMEL Level 3 4       DATA  Extended Session (60+ minutes): No  Interactive Complexity: No  Crisis: No  [facility] Patient: No    Treatment Objective(s) Addressed in This Session:  Target Behavior(s):  anxiety and depression    Depressed Mood: Increase interest, engagement, and pleasure in doing things  Decrease frequency and intensity of feeling down, depressed, hopeless  Improve quantity and quality of night time sleep / decrease daytime naps  Feel less tired and more energy during the day   Improve diet, appetite, mindful eating, and / or meal planning  Identify negative self-talk and behaviors: challenge core beliefs, myths, and actions  Improve concentration, focus, and mindfulness in daily activities   Feel less fidgety, restless or slow in daily activities / interpersonal interactions  Anxiety: will experience a reduction in anxiety, will develop more effective coping skills to manage anxiety symptoms, will develop healthy cognitive patterns and beliefs, and will increase ability to function adaptively    Current Stressors / Issues:  Pt reports  "she has been well since last session. Discussed pts desire to have better, more respectful relationships with others in her family. Validated pts concerns and desires. Discussed identifying  how she would like to be treated by others and ways of letting others know when they have crossed the boundary with her. Processed the difficult nature of setting boundaries with others and push back she may experience. Discussed relationship with spouse and difficulties she has with him.       1/11/24 (DA)   The reason for seeking services at this time is: \" anxiety and feeling more down \"   The problem(s) began 1 year.      Met with pt for initial assessment. Reports being more down and sad since last spring. Discussed family rupture which has left her feeling very sad. States being a someone who tries to always do the the right thing for others and feels it hurts her in the end. More recently, has attempted to set boundaries with step dtr which ended in discord throughout the family. States she struggled with support from  initially but has been more supportive of her lately. She expresses when she tries to ask for basic respect from others, others tend to have a strong reaction towards her and implications tend to be drastic. Discussed wanting to figure out more recent rapture within family system and ways of managing it. Pt expresses trauma as a child when mother was very ill with cancer. Again experienced trauma when then  [place]. At that time, reports losing everything in her life and has had lasting effects on children and her.      Patient has attempted to resolve these concerns in the past through therapy .    Progress on Treatment Objective(s) / Homework:  Satisfactory progress - PRECONTEMPLATION (Not seeing need for change); Intervened by educating the patient about the effects of current behavior on health.  Evoked information about reasons to continue behavior, express " concern / recommendations, and explored any change talk    Motivational Interviewing    MI Intervention: Expressed Empathy/Understanding, Supported Autonomy, Collaboration, Evocation, Permission to raise concern or advise, Open-ended questions, Change talk (evoked), and Reframe     Change Talk Expressed by the Patient: Desire to change    Provider Response to Change Talk: E - Evoked more info from patient about behavior change, A - Affirmed patient's thoughts, decisions, or attempts at behavior change, R - Reflected patient's change talk, and S - Summarized patient's change talk statements    Assessments completed prior to visit:  The following assessments were completed by patient for this visit:  PHQ2:       12/26/2023    10:03 AM 6/23/2022    11:38 AM 6/7/2022     9:39 AM 1/28/2021     8:08 AM 1/27/2016    12:10 PM 12/2/2013     9:50 AM 9/4/2012    12:23 PM   PHQ-2 ( 1999 [employer])   Q1: Little interest or pleasure in doing things 0 0 0 0 0 0 0   Q2: Feeling down, depressed or hopeless 0 0 0 0 0 0 0   PHQ-2 Score 0 0 0 0 0 0 0   PHQ-2 Total Score (12-17 Years)- Positive if 3 or more points; Administer PHQ-A if positive    0      Q1: Little interest or pleasure in doing things Not at all  Not at all    Not at all       Q2: Feeling down, depressed or hopeless Not at all  Not at all    Not at all       PHQ-2 Score 0  0    0         PHQ9:        No data to display              GAD2:       1/11/2024     3:10 PM   SOILA-2   Feeling nervous, anxious, or on edge 3   Not being able to stop or control worrying 1   SOILA-2 Total Score 4     GAD7:       1/11/2024     3:10 PM   SOILA-7 SCORE   Total Score 11 (moderate anxiety)   Total Score 11     CAGE-AID:       1/11/2024     2:58 PM   CAGE-AID Total Score   Total Score 0   Total Score MyChart 0 (A total score of 2 or greater is considered clinically significant)     PROMIS 10-Global Health (all questions and answers displayed):       1/11/2024     3:07 PM   PROMIS 10   In general,  would you say your health is: Good   In general, would you say your quality of life is: Good   In general, how would you rate your physical health? Good   In general, how would you rate your mental health, including your mood and your ability to think? Good   In general, how would you rate your satisfaction with your social activities and relationships? Good   In general, please rate how well you carry out your usual social activities and roles Good   To what extent are you able to carry out your everyday physical activities such as walking, climbing stairs, carrying groceries, or moving a chair? Completely   In the past 7 days, how often have you been bothered by emotional problems such as feeling anxious, depressed, or irritable? Always   In the past 7 days, how would you rate your fatigue on average? Mild   In the past 7 days, how would you rate your pain on average, where 0 means no pain, and 10 means worst imaginable pain? 3   In general, would you say your health is: 3   In general, would you say your quality of life is: 3   In general, how would you rate your physical health? 3   In general, how would you rate your mental health, including your mood and your ability to think? 3   In general, how would you rate your satisfaction with your social activities and relationships? 3   In general, please rate how well you carry out your usual social activities and roles. (This includes activities at home, at work and in your community, and responsibilities as a parent, child, spouse, employee, friend, etc.) 3   To what extent are you able to carry out your everyday physical activities such as walking, climbing stairs, carrying groceries, or moving a chair? 5   In the past 7 days, how often have you been bothered by emotional problems such as feeling anxious, depressed, or irritable? 5   In the past 7 days, how would you rate your fatigue on average? 2   In the past 7 days, how would you rate your pain on average,  where 0 means no pain, and 10 means worst imaginable pain? 3   Global Mental Health Score 10   Global Physical Health Score 16   PROMIS TOTAL - SUBSCORES 26     PROMIS 10-Global Health (only subscores and total score):       1/11/2024     3:07 PM   PROMIS-10 Scores Only   Global Mental Health Score 10   Global Physical Health Score 16   PROMIS TOTAL - SUBSCORES 26     [place] Suicide Severity Rating Scale (Lifetime/Recent)      1/12/2024     8:08 AM   [place] Suicide Severity Rating (Lifetime/Recent)   Q1 Wish to be Dead (Lifetime) Y   Wish to be Dead Description (Lifetime) 2000 when  went to [facility], no plan or intentions   1. Wish to be Dead (Past 1 Month) N   Q2 Non-Specific Active Suicidal Thoughts (Lifetime) N   Reasons for Ideation (Lifetime) 4   Actual Attempt (Lifetime) N   Has subject engaged in non-suicidal self-injurious behavior? (Lifetime) N   Interrupted Attempts (Lifetime) N   Aborted or Self-Interrupted Attempt (Lifetime) N   Preparatory Acts or Behavior (Lifetime) N   Calculated C-SSRS Risk Score (Lifetime/Recent) No Risk Indicated       Care Plan review completed: Yes    Medication Review:  No changes to current psychiatric medication(s)    Medication Compliance:  Yes    Changes in Health Issues:   None reported    Chemical Use Review:   Substance Use: Chemical use reviewed, no active concerns identified      Tobacco Use: No current tobacco use.      Assessment: Current Emotional / Mental Status (status of significant symptoms):  Risk status (Self / Other harm or suicidal ideation)  Patient denies a history of suicidal ideation, suicide attempts, self-injurious behavior, homicidal ideation, homicidal behavior, and and other safety concerns  Patient denies current fears or concerns for personal safety.  Patient denies current or recent suicidal ideation or behaviors.  Patient denies current or recent homicidal ideation or behaviors.  Patient denies current or recent self injurious behavior or  ideation.  Patient denies other safety concerns.  A safety and risk management plan has not been developed at this time, however patient was encouraged to call [place] [phone] should there be a change in any of these risk factors.    Appearance:   Appropriate   Eye Contact:   Good   Psychomotor Behavior: Normal   Attitude:   Cooperative   Orientation:   All  Speech   Rate / Production: Normal    Volume:  Normal   Mood:    Anxious  Depressed   Affect:    Appropriate   Thought Content:  Clear   Thought Form:  Coherent  Logical   Insight:    Good     Diagnoses:  1. Generalized anxiety disorder    2. Mild episode of recurrent major depressive disorder (H24)        Collateral Reports Completed:  Not Applicable    Plan: (Homework, other):  Patient was given information about behavioral services and encouraged to schedule a follow up appointment with the clinic [facility] in 1 week.  She was also given information about mental health symptoms and treatment options .  CD Recommendations: No indications of CD issues.       STIVEN Godinez    ______________________________________________________________________    Integrated Primary Care Behavioral Health Treatment Plan    Patient's Name: Emely Forbes  YOB: 1952    Date of Creation: 1/18/24  Date Treatment Plan Last Reviewed/Revised: 1/18/24    DSM5 Diagnoses: 296.31 (F33.0) Major Depressive Disorder, Recurrent Episode, Mild _ or 300.02 (F41.1) Generalized Anxiety Disorder  Psychosocial / Contextual Factors:  Individual Factors management of MH and Family Factors family discord    PROMIS (reviewed every 90 days):     Referral / Collaboration:  Referral to another professional/service is not indicated at this time..    Anticipated number of session for this episode of care: 4-6 sessions  Anticipation frequency of session: Weekly  Anticipated Duration of each session: 38-52 minutes  Treatment plan will be reviewed in 90 days or when goals have been changed.        MeasurableTreatment Goal(s) related to diagnosis / functional impairment(s)  Goal 1: Patient will work with providers to manage symptoms    I will know I've met my goal when less anxious and down.      Objective #A (Patient Action)    Patient will  attend all appointments, take medication as prescribed .  Status: New - Date: 1/18/24      Intervention(s)  Therapist will  Monitor and assist in overcoming barriers to treatment adherence .    Objective #B  Patient will  consider all recommendations offered .  Status: New - Date: 1/18/24      Intervention(s)  Therapist will  educate patient on treatment options, clarify concerns, work with pt to overcome any resistance to compliance .        Patient has reviewed and agreed to the above plan.      STIVEN Godinez  January 18, 2024     Marriage status:  Living condition:  Physical condition: no walker, no O2 needed  no smoking, social drinker, no recreational drug using  Immunization: flushot,                             Pneumonia shot,                              TD Physical condition: no walker, no O2 needed  no smoking, social drinker, no recreational drug using  Immunization: flu , Pneumonia

## 2024-03-06 NOTE — ED ADULT NURSE NOTE - PMH
Patient will be out soon URGENT REQUEST !!!    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

## 2024-03-25 NOTE — H&P ADULT - PROBLEM SELECTOR PLAN 4
Statement Selected - atorvastatin 40 mg PO daily CT with gallstones  Check liver/GB US  trend  consider GI consult

## 2024-04-15 NOTE — ASU PREOP CHECKLIST - VERIFY SURGICAL SITE/SIDE WITH PATIENT
done
Continue Regimen: Triamcinolone ointment BID x 2 weeks at a time as needed
Render In Strict Bullet Format?: No
Detail Level: Zone

## 2024-05-21 NOTE — ASU PATIENT PROFILE, ADULT - NS SC CAGE ALCOHOL GUILTY ABOUT
History  Chief Complaint   Patient presents with    Vomiting     Per grandmom pt was at school today and became dizzy and lightheaded. Pt has been vomiting intermittently at home and lost a total of 16lbs in a few weeks     Patient is a 14 y/o F that was brought to the ED by mother for nausea and vomiting for 2 weeks.  She states she vomits every day, but has not vomited today.  She denies diarrhea.  Last BM was 2 days ago which is normal for her.  Mother has been giving her nexium for 11 days, but it hasn't helped.  She states she has seen GI in the past for this and was told it was reflux, but nexium has not worked.  Mother states patient has had 12 lb weight loss in a few weeks.  She states her son does have Diabetes.  Patient denies urinary symptoms.  LNMP was 1 week ago, she denies risk of pregnancy.  Today at school patient states she felt very lightheaded.  She has generalized abdominal pain.       History provided by:  Patient and parent  Vomiting  Associated symptoms: abdominal pain    Associated symptoms: no chills, no cough, no diarrhea and no fever        None       History reviewed. No pertinent past medical history.    History reviewed. No pertinent surgical history.    History reviewed. No pertinent family history.  I have reviewed and agree with the history as documented.    E-Cigarette/Vaping     E-Cigarette/Vaping Substances          Review of Systems   Constitutional:  Positive for appetite change and unexpected weight change. Negative for chills and fever.   Respiratory:  Negative for cough.    Cardiovascular:  Negative for chest pain.   Gastrointestinal:  Positive for abdominal pain, nausea and vomiting. Negative for blood in stool, constipation and diarrhea.   Genitourinary:  Negative for dysuria.   Musculoskeletal:  Negative for back pain and neck pain.   Skin:  Negative for color change and rash.   Neurological:  Positive for weakness (generalized). Negative for dizziness, light-headedness  and numbness.   Psychiatric/Behavioral:  Negative for confusion.    All other systems reviewed and are negative.      Physical Exam  Physical Exam  Vitals and nursing note reviewed.   Constitutional:       General: She is not in acute distress.     Appearance: Normal appearance. She is well-developed and well-groomed. She is not ill-appearing.   HENT:      Head: Normocephalic and atraumatic.      Right Ear: Hearing normal.      Left Ear: Hearing normal.      Nose: Nose normal.      Mouth/Throat:      Mouth: Mucous membranes are moist.   Eyes:      Conjunctiva/sclera: Conjunctivae normal.      Pupils: Pupils are equal.   Cardiovascular:      Rate and Rhythm: Normal rate and regular rhythm.      Heart sounds: Normal heart sounds.   Pulmonary:      Effort: Pulmonary effort is normal.      Breath sounds: Normal breath sounds. No wheezing, rhonchi or rales.   Abdominal:      General: Abdomen is flat. Bowel sounds are normal.      Palpations: Abdomen is soft.      Tenderness: There is no abdominal tenderness.   Musculoskeletal:         General: Normal range of motion.      Cervical back: Normal range of motion and neck supple.      Right lower leg: No edema.      Left lower leg: No edema.   Skin:     General: Skin is warm and dry.      Coloration: Skin is not jaundiced or pale.      Findings: No rash.   Neurological:      General: No focal deficit present.      Mental Status: She is alert and oriented to person, place, and time.      Motor: No weakness.   Psychiatric:         Mood and Affect: Mood is anxious.         Behavior: Behavior is cooperative.         Vital Signs  ED Triage Vitals   Temperature Pulse Respirations Blood Pressure SpO2   05/21/24 1410 05/21/24 1408 05/21/24 1408 05/21/24 1408 05/21/24 1408   98.1 °F (36.7 °C) 91 (!) 20 111/72 99 %      Temp src Heart Rate Source Patient Position - Orthostatic VS BP Location FiO2 (%)   05/21/24 1410 05/21/24 1408 05/21/24 1408 05/21/24 1408 --   Temporal Monitor  Sitting Right arm       Pain Score       05/21/24 1705       9           Vitals:    05/21/24 1408 05/21/24 1715   BP: 111/72 (!) 99/51   Pulse: 91 75   Patient Position - Orthostatic VS: Sitting          Visual Acuity      ED Medications  Medications   morphine injection 2 mg (2 mg Intravenous Given 5/21/24 1705)       Diagnostic Studies  Results Reviewed       Procedure Component Value Units Date/Time    Comprehensive metabolic panel [[id number]]  (Abnormal) Collected: 05/21/24 1704    Lab Status: Final result Specimen: Blood from Arm, Right Updated: 05/21/24 1730     Sodium 138 mmol/L      Potassium 3.6 mmol/L      Chloride 103 mmol/L      CO2 22 mmol/L      ANION GAP 13 mmol/L      BUN 10 mg/dL      Creatinine 0.71 mg/dL      Glucose 111 mg/dL      Calcium 10.5 mg/dL      AST 23 U/L      ALT 15 U/L      Alkaline Phosphatase 63 U/L      Total Protein 8.4 g/dL      Albumin 5.2 g/dL      Total Bilirubin 0.44 mg/dL      eGFR --    Narrative:      The reference range(s) associated with this test is specific to the age of this patient as referenced from Clinicbook Handbook, 22nd Edition, 2021.  Notes:     1. eGFR calculation is only valid for adults 18 years and older.  2. EGFR calculation cannot be performed for patients who are transgender, non-binary, or whose legal sex, sex at birth, and gender identity differ.    Magnesium [[id number]]  (Normal) Collected: 05/21/24 1704    Lab Status: Final result Specimen: Blood from Arm, Right Updated: 05/21/24 1730     Magnesium 2.1 mg/dL     Narrative:      The reference range(s) associated with this test is specific to the age of this patient as referenced from Clinicbook Handbook, 22nd Edition, 2021.    Lipase [[id number]]  (Normal) Collected: 05/21/24 1704    Lab Status: Final result Specimen: Blood from Arm, Right Updated: 05/21/24 1730     Lipase 30 u/L     Narrative:      The reference range(s) associated with this test is specific to the age of this patient as  referenced from [facility] Handbook, 22nd Edition, 2021.    CBC and differential [[id number]]  (Abnormal) Collected: 05/21/24 1704    Lab Status: Final result Specimen: Blood from Arm, Right Updated: 05/21/24 1714     WBC 5.17 Thousand/uL      RBC 4.89 Million/uL      Hemoglobin 14.0 g/dL      Hematocrit 40.2 %      MCV 82 fL      MCH 28.6 pg      MCHC 34.8 g/dL      RDW 11.8 %      MPV 10.2 fL      Platelets 311 Thousands/uL      nRBC 0 /100 WBCs      Segmented % 53 %      Immature Grans % 0 %      Lymphocytes % 24 %      Monocytes % 22 %      Eosinophils Relative 0 %      Basophils Relative 1 %      Absolute Neutrophils 2.72 Thousands/µL      Absolute Immature Grans 0.02 Thousand/uL      Absolute Lymphocytes 1.24 Thousands/µL      Absolute Monocytes 1.14 Thousand/µL      Eosinophils Absolute 0.02 Thousand/µL      Basophils Absolute 0.03 Thousands/µL     Urine Microscopic [[id number]]  (Abnormal) Collected: 05/21/24 1606    Lab Status: Final result Specimen: Urine, Clean Catch Updated: 05/21/24 1652     RBC, UA 0-1 /hpf      WBC, UA 0-1 /hpf      Epithelial Cells Occasional /hpf      Bacteria, UA Moderate /hpf      MUCUS THREADS Occasional    Narrative:      Microscopic performed by Mercedes Taylor.     POCT pregnancy, urine [[id number]]  (Normal) Resulted: 05/21/24 1618    Lab Status: Final result Updated: 05/21/24 1618     EXT Preg Test, Ur Negative     Control Valid    UA w Reflex to Microscopic w Reflex to Culture [[id number]]  (Abnormal) Collected: 05/21/24 1606    Lab Status: Final result Specimen: Urine, Clean Catch Updated: 05/21/24 1615     Color, UA Yellow     Clarity, UA Clear     Specific Gravity, UA 1.020     pH, UA 8.0     Leukocytes, UA Negative     Nitrite, UA Negative     Protein, UA Trace mg/dl      Glucose, UA Negative mg/dl      Ketones, UA 40 (2+) mg/dl      Urobilinogen, UA <2.0 mg/dl      Bilirubin, UA Negative     Occult Blood, UA Negative                   No orders to display         "      Procedures  Procedures         ED Course         CRAFFT      Flowsheet Row Most Recent Value   CRAFFT Initial Screen: During the past 12 months, did you:    1. Drink any alcohol (more than a few sips)?  No Filed at: 05/21/2024 1658   2. Smoke any marijuana or hashish No Filed at: 05/21/2024 1658   3. Use anything else to get high? (\"anything else\" includes illegal drugs, over the counter and prescription drugs, and things that you sniff or 'tapia')? No Filed at: 05/21/2024 1658                                            Medical Decision Making  Patient with N/V for 2 weeks, was seen in the past by [facility] told it was reflux, will order labs to r/o anemia, electrolyte abnormality, dehydration, pregnancy.  Patient with slightly high blood sugar, non fasting, advised f/u with PCP.  Will refer to pediatric GI.  D/w mother could be related to anxiety, [facility] to [facility].    Return precautions given.       Amount and/or Complexity of Data Reviewed  Labs: ordered.    Risk  Prescription drug management.             Disposition  Final diagnoses:   Nausea and vomiting     Time reflects when diagnosis was documented in both MDM as applicable and the Disposition within this note       Time User Action Codes Description Comment    5/21/2024  5:40 PM Sushma Wiggins Add [R11.2] Nausea and vomiting           ED Disposition       ED Disposition   Discharge    Condition   Stable    Date/Time   Tue May 21, 2024 1740    Comment   Loida Dyson discharge to home/self care.                   Follow-up Information       Follow up With Specialties Details Why Contact Info    Randell Gunter MD Pediatric Gastroenterology Schedule an appointment as soon as possible for a visit  For recheck [address] [place] [id number]  [phone]      [facility] Behavioral Health Call  For recheck [address] [facility] [address] [phone]              There are no discharge " medications for this patient.          PDMP Review       None            ED Provider  Electronically Signed by             Sushma Wiggins PA-C  05/21/24 8969     no
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
## 2024-06-04 NOTE — PATIENT PROFILE ADULT. - FUNCTIONAL SCREEN CURRENT LEVEL: COMMUNICATION, MLM
Skilled reason for visit: education regarding medications and disease     Caregiver involvement: daughter lives with patient .    Medications reviewed and all medications are available in the home this visit.    The following education was provided regarding medications:  all meds are in the home and she is taking htem as ordered .    MD notified of any discrepancies/look a-like medications/medication interactions: none  Medications are effective at this time.      Home health supplies by type and quantity ordered/delivered this visit include: none    Patient education provided this visit: SN educated on s/s of infection, proppinge her leg up, using ice to hip    Sharps education provided: Clinician instructed patient/CG on proper disposal of sharps: Containers should be made of hard plastic, be puncture-resistant and leakproof, such as a laundry detergent or bleach bottle.  When the container is ¾ full, it should be sealed with tape and labeled DO NOT RECYCLE prior to discarding in the regular trash.      Patient level of understanding of education provided: patient verbalized understanding    Patient response to procedure performed:  nA    Agency Progress toward goals: progressing     Patient's Progress towards personal goals: patient stated that she is feeling better     Home exercise program: deep breathing     Continued need for the following skills: Nursing.    Plan for next visit: education    Patient and/or caregiver notified and agrees to changes in the Plan of Care: N/A.     The following discharge planning was discussed with the pt/caregiver: when goals met and education is complete
(0) understands/communicates without difficulty

## 2024-08-13 NOTE — H&P ADULT - ASSESSMENT
84 yo female w/ PMH of afib on coumadin, CVA and hx of aneurysm, HTN, HLD, mitral valve prolapse, PVD s/p stents x 3 (RLE), rheumatoid arthritis, who presents with one week of fever (measured at home, pt doesn't remember temp), diaphoresis chills, weakness, and lethargy, found with T 102 and  and found to have WBC 11.52 with 80.1% PMNs, and UA with small leukocyte esterase, 6-10 WBCs, moderate bacteria consistent with UTI with sepsis.
137

## 2024-08-27 NOTE — ED PROVIDER NOTE - CADM POA URETHRAL CATHETER
Quality 431: Preventive Care And Screening: Unhealthy Alcohol Use - Screening: Patient not identified as an unhealthy alcohol user when screened for unhealthy alcohol use using a systematic screening method
Quality 130: Documentation Of Current Medications In The Medical Record: Current Medications Documented
Detail Level: Detailed
Quality 226: Preventive Care And Screening: Tobacco Use: Screening And Cessation Intervention: Tobacco Screening not Performed
No

## 2024-10-03 NOTE — DISCHARGE NOTE ADULT - NS AS DC FOLLOWUP STROKE INST
[FreeTextEntry1] : Fall Forehead abrasions -exam benign, VSS -CTH reported negative in the ED -patient on asa, will check cbc  HCM -pending CPE next week, will obtain routine labs  Coumadin/Warfarin

## 2024-10-30 NOTE — H&P ADULT - PROBLEM SELECTOR PLAN 1
97.7 PPI adrián  GI Dr Medellin consulted EGD once stable  Tranfusion to hbg >8  Hold coumadin, s/p reversal

## 2024-12-17 NOTE — PROGRESS NOTE ADULT - REASON FOR ADMISSION
HISTORY      Carrie Mckeon is a 35 year old year old woman who presents to discuss following concerns.     Pt has reports constipation, LLQ discomfort since last week. BM has been small once a day. Usual 2 BM w good caliber.   Trying to increase hydration  Pt reports smaller stools before period. Cramps right before and during early period. No other significant PMS symptoms     HTN    Apr 2019. Initial OV. H/o elevated BP x few years, especially since 2016. Weight gain . 2015 -194 lbs. Current 232 lbs. Rx Lisinopril 10mg.   May 2019 Home meter calibrated - Home meter 132/86. Office meter 120/ 68. Home readings 120s/ 80s   EKG NSR. ECHO EF 63 %. Grade I/IV diastolic d/f, normal to mildly elevated filling pressures. UA neg for protein.   Fam hx HTN - F in 20s, no CAD. Hi cholesterol - M, F. CAD in PGF   Today. No CP/ SWAN/ palpitations/ tachycardia. No lightheadedness/ dizziness/ syncopal or near syncopal episodes. No orthopnea or PND. No vision changes or headaches. No lower extremity edema.       DM  Nov 2021 Rx Metformin 500mg XR. Pt deferred nutrition .   Mar 2023 Cholesterol hi, A1c unchanged. Rx Metformin inc to 750mg   Jan 2024 Cut out fried foods. Limiting carbs. A1C improved   Started [employer] program thru work to help w weight loss and reverse diabetes. Includes lab monitoring, home BG checks.   Keto based diet. Limited carbs, good prot, fat and vegs at every meal  Jun 2024 216 lbs, Jan 2024 was 240 lbs  BG fasting and 2 hr after meals in low 90s. No episodes of hypoglycemia   Today. FBG < 100  No fam hx DM      Dyslipidemia  Reviewed diet changes, Omega 3 fatty acid benefits.   Patient hesitant regarding statin due to side effects in parents   Jan 2024 wt 240 lbs Cut out fried foods. Limiting carbs. Benefits of anti-inflammatory diet, plant based diet and eTRF reviewed.   Started [employer] program. Keto based diet   Fam hx high cholesterol M. M had s/e to statin 
AMS, lethargy
  Jan 2024 labs thru [facility]- A1C 5.4 Total chol 196 /  /  / HDL 65   Urine microalbumin Alb/ Creat ration 11 (0-29) CMP wnl   May 2024 CACS 0  Jun 2024 weight 216 lbs  FLP - TG, HDL wnl.   Adv limit animal-based protein, high saturated fat foods, eTRF, incorporate more plant-based proteins. Continue metformin.   Today. Started new job in Oct, stress decreased. Not following previous program. Considering starting new program thru current insurance in 2025     Anxiety and depression  Primarily anxiety.  Since teens. Worse w periods. Hives during anxiety attacks.   Coping skills thru mom who is a school Pshycologist  Prev on Zoloft, stopped in 2016 as felt not effective, increased stress at work. Stress improved w job change. Prior to that tried OC - made symptoms worse  Tearful, overwhelmed sensation. No known triggers. Using young living essential oils.   Apr 2019. Restarted Zoloft 50mg. Adv Mg   May 2019 Less overwhelmed and tearful  Nov 2021 Sep dad dxed w pancreatic cancer. Tearful, sad. Interest, energy, concentration - low at times. Trying to establish with therapy   Zoloft inc to 100mg, Rx Lorazepam 0.5mg bid prn  Dec 2021 started CBT,  got a puppy. Dad passed Jan 2022   Dec 2022 Lorazepam infreq use. Difficult to exercise. Lives alone, good social support.   Today. Significant decrease in stress since job change in Oct.     Supplements:   Mg glycinate 500mg   MVI Vit D3 30 mcg , B12 25 mcg, Fe 18 mg, Zn 30mg  Vit D3 16486 IU qod   Omega 3 fatty acid 1000mg   Probiotic   Coq10 200mg      Prev -   Probiotic      Social History     Social History Narrative    Apr 2019. Single. Lives alone. Working -  - enjoys work. Has good social support.     Social History     Tobacco Use    Smoking status: Never    Smokeless tobacco: Never   Substance Use Topics    Alcohol use: Yes     Comment: socially/rare    Drug use: No     Current Outpatient Medications   Medication Sig    lisinopril 
AMS, lethargy
AMS, lethargy, COVID
(ZESTRIL) 10 MG tablet Take 1 tablet by mouth daily.    metFORMIN (GLUCOPHAGE-XR) 750 MG 24 hr tablet Take 1 tablet by mouth daily (with breakfast).    sertraline (ZOLOFT) 100 MG tablet Take 1 tablet by mouth daily.    LORazepam (ATIVAN) 0.5 MG tablet Take 1 tablet by mouth daily as needed for Anxiety.    DISPENSE One daily multi vitamin/magnesium glycinate 500 mg/acidophilis: probiotic/[id number] mg/Fish oil 1000mg    vitamin D3 (CHOLECALCIFEROL) 1.25 mg (50,000 units) capsule     olopatadine (Patanase) 0.6 % nasal spray Spray 2 sprays in each nostril 2 times daily. 2 sprays each nostril twice daily     No current facility-administered medications for this visit.     Past Medical History:   Diagnosis Date    Acute bronchitis     Anxiety     Depression      Past Surgical History:   Procedure Laterality Date    Tonsillectomy and adenoidectomy      as a child      Family History   Problem Relation Age of Onset    Hyperlipidemia Mother     Hearing Loss Father     Hyperlipidemia Father     Hypertension Father     Cancer, Pancreatic Father     Cancer Father     Osteoarthritis Maternal Grandmother     Osteoarthritis Paternal Grandmother     Depression Paternal Grandmother     Heart disease Paternal Grandfather      ALLERGIES:   Allergen Reactions    Casein GI UPSET, Nausea & Vomiting and Other (See Comments)     Pt reports she also gets nasal stuffiness and drainage which eventually causes SOB.       REVIEW OF SYSTEMS    A review of systems including constitutional symptoms, neurological, ears/nose/mouth/throat/eyes, cardiovascular, respiratory, gastrointestinal, genitourinary, musculoskeletal, skin, endocrine, psychological, immunological and gynecological, is performed today and is negative with the exception of above noted in history of present illness.     PHYSICAL EXAM      Vital Signs:    Vitals:    12/17/24 1043   BP: 112/70   Weight: 106.9 kg (235 lb 11.2 oz)   Height: 5' 4\" (1.626 m)   LMP: 06/13/2024 
AMS, lethargy
      Constitutional: Patient is a pleasant, well-appearing, adult woman in no apparent distress.   Cardiovascular: Regular rate and rhythm, no murmur. No peripheral edema. Extremities are warm. DPP 2 + b/l.   Respiratory: Clear to auscultation bilaterally, no diminished breath sounds. Chest movement is symmetric, patient is in no respiratory distress.    GI: Soft, nontender, non-distended, no masses. Obese abdomen  Musculoskeletal: All extremities are warm, dry, no cyanosis, or edema.  No joint erythema or tenderness.   Psychiatric: Patient maintains good eye contact. Mood is congruent and affect appropriate. No tangential thoughts, normal rate and content of speech, no tearfulness throughout our conversation today.       ASSESSMENT & PLAN    1. Primary hypertension    2. Constipation, unspecified constipation type    3. Abdominal discomfort in left lower quadrant    4. Type 2 diabetes mellitus without complication, without long-term current use of insulin  (CMD)    5. Elevated cholesterol    6. Screening for thyroid disorder    7. Screening for blood or protein in urine    8. Encounter for vitamin deficiency screening    9. Screening for endocrine, nutritional, metabolic and immunity disorder    10. Need for vaccination        Recommendations today:      Primary hypertension  - CBC with Automated Differential; Future  BP well controlled. Continue Lisinopril.      Type 2 diabetes mellitus without complication, without long-term current use of insulin  (CMD)  - Comprehensive Metabolic Panel; Future  - Glycohemoglobin; Future  Pt is encouraged to continue healthy diet and lifestyle changes.   Continue to follow home blood glucose levels and follow up with low BG.    Continue metformin.       Elevated cholesterol  - Lipid Panel With Reflex; Future  Continue diet, lifestyle recommendations to lower cholesterol   Continue CoQ10      Anxiety  Improved  Continue Zoloft. Lorazepam infreq use.      Constipation, unspecified 
AMS, lethargy
constipation type  Abdominal discomfort in left lower quadrant  - XR ABDOMEN 1 VIEW; Future  Constipation contributing to symptoms based on change in BM and symptoms discussed.  Advised good hydration w adequate fiber . Ensure 35g fiber intake daily especially before periods.   Miralax 1-2 times daily for a few days. Monitor response   Once BM back to normal, consider fiber supplement to maintain reg BM as needed.      Screening for endocrine, nutritional, metabolic and immunity disorder  - Ferritin; Future  - Zinc; Future  - Magnesium, Rbc; Future  - Vitamin B12; Future    Screening for thyroid disorder  - Thyroid Stimulating Hormone; Future    Encounter for vitamin deficiency screening  - Vitamin D -25 Hydroxy; Future    Screening for blood or protein in urine  - Urinalysis & Reflex Microscopy; Future    Need for vaccination  - PNEUMOCOCCAL 20 ([id number])  - COVID SPIKEVAX 12+    F/u for CPE. Pre-visit labs ordered. Labs to be reviewed at follow-up visit.      Orders Placed This Encounter    XR ABDOMEN 1 VIEW    PNEUMOCOCCAL 20 ([id number])    COVID SPIKEVAX 12+    CBC with Automated Differential    Comprehensive Metabolic Panel    Lipid Panel With Reflex    Thyroid Stimulating Hormone    Glycohemoglobin    Urinalysis & Reflex Microscopy    Vitamin D -25 Hydroxy    Ferritin    Zinc    Magnesium, Rbc    Vitamin B12    lisinopril (ZESTRIL) 10 MG tablet    metFORMIN (GLUCOPHAGE-XR) 750 MG 24 hr tablet    sertraline (ZOLOFT) 100 MG tablet     Patient's questions were answered and she expressed appreciation. Patient was educated as to the above assessment and plan. All treatments, interventions including Integrative Medicine treatment strategies and their potential side effects were reviewed. Patient verbalizes understanding of plan of care and is in agreement. Patient is encouraged to contact us  with any questions or concerns.    40 minutes spent on the visit.  This includes chart review, documenting, face-to-face 
AMS, lethargy
counseling, education and coordination of care regarding patient's symptoms and concerns as described above.                            
AMS, lethargy

## 2024-12-27 NOTE — PATIENT PROFILE ADULT. - LIVING ARRANGEMENTS, TEMPORARY FAMILY, PROFILE
No protocol for requested medication.    Medication: benzonatate (TESSALON PERLES) 200 MG capsule   Last office visit date: 12/5/2024  Pharmacy: [facility] [id number] - [place], [place] - [address]    Order pended, routed to clinician for review.      none required

## 2025-02-20 NOTE — H&P ADULT - NSHPPOAPULMEMBOLUS_GEN_A_CORE
Rx Refill Note  Requested Prescriptions     Pending Prescriptions Disp Refills    omeprazole (priLOSEC) 40 MG capsule 90 capsule 1     Sig: Take 1 capsule by mouth Daily.      Last office visit with prescribing clinician: 11/20/2024   Last telemedicine visit with prescribing clinician: Visit date not found   Next office visit with prescribing clinician: 2/21/2025                         Would you like a call back once the refill request has been completed: [] Yes [] No    If the office needs to give you a call back, can they leave a voicemail: [] Yes [] No    Elysia Ratliff MA  02/20/25, 13:45 EST     no

## 2025-04-16 NOTE — DISCHARGE NOTE PROVIDER - NSDCCPGOAL_GEN_ALL_CORE_FT
Nutrition and Speech following  Calorie count conducted 4/8 - 4/10 with averaging 650 Kcal/d and 15g of protein per day. SLP downgraded textures to puree r/t increased dysphagia.  
Poor prognosis discussed with .   Consider treatment-focused care versus comfort-focused care given her age, cognitive impairment, physical deconditioning after prolonged hospitalization due to MSSA bacteremia, and comorbidities with arthritis, CKD,CHF  Continue Abx as per ID  OT/PT/nutrition support  
Recently hospitalized with severe sepsis. Source of infection likely from multiple abrasions/wounds bilateral lower extremities  Continue cefazolin 2 g IV every 12 hours via left arm PICC line through May 4, 2025.  Nursing to remove PICC line after last dose of IV antibiotic.  No infection noted at PICC line insertion site  Continue weekly blood work as per ID  Follow-up with ID, next appointment 04/29    
Reported while in the hospital, likely in the setting of severe sepsis/MSSA bacteremia  MRI brain 3/27/25 no acute pathology  Delirium precautions:  -Maintain normal sleep/wake cycle, lights and tv on during day with blinds open, lights and tv off at bedtime with blinds closed, minimize unnecessary interruptions  -continue supportive care and frequent reorientation   -monitor for adequate urine output as both urine and stool retention may contribute to and worsen delirium  -pain control with Tylenol 650 mg TID, duloxetine, lyrica.  
Was consulted by Geriatric Medicine in 9/2024 with [facility] 11/26. Suspecting Alzheimer's dementia with polypharmacy contributing to cognitive impairment.  Encourage family visits.  Provide frequent reorientation, redirection  Support ADLs/iADLs in SNF  
With nausea and epigastric pain reported  Increase Omeprazole to 40 mg in the morning daily. Zofran added prn for nausea  Consider adding PPI at bed time for better control of symptoms  
To get better and follow your care plan as instructed.

## 2025-06-07 NOTE — BRIEF OPERATIVE NOTE - SURGICAL END DATE/TIME
Group Therapy Note    Date: 6/7/2025    Group Start Time: 1100  Group End Time: 1200  Group Topic: Cognitive Skills    [facility]    Kenzie Lorenzo LPC    Group Documentation  The group explored values, what their values were, where they came from and what values they could like to embody. The group shared common values such as trust and honesty. The group was a decent size and cohesion was well.     Group Therapy Note    Attendees: 7           Notes:  The pt was invited but did not attend.        Signature:  Kenzie Lorenzo LPC    
09-Nov-2017 12:57

## 2025-06-26 NOTE — DISCHARGE NOTE ADULT - ADDITIONAL INSTRUCTIONS
Jaida Hamilton is a 58 year old female here for  Chief Complaint   Patient presents with    Video Visit     Leukocytosis, unspecified type       Denies latex allergy or sensitivity.    Medication verified and med list updated.  PCP and Pharmacy verified.    Social History     Tobacco Use   Smoking Status Every Day    Current packs/day: 1.00    Average packs/day: 1 pack/day for 46.5 years (46.5 ttl pk-yrs)    Types: Cigarettes    Start date: 1979    Passive exposure: Current   Smokeless Tobacco Never     Advance Directives Filed: Yes    Vitals:    There were no vitals taken for this visit.    These vital signs are:      Height: Yes, shoes on.  Ht Readings from Last 1 Encounters:   06/19/25 5' 3\" (1.6 m)     Weight:Yes, shoes on.  Wt Readings from Last 3 Encounters:   06/19/25 81.5 kg (179 lb 10.8 oz)   05/22/25 79.6 kg (175 lb 6.4 oz)   05/22/25 79.7 kg (175 lb 11.3 oz)       BMI: There is no height or weight on file to calculate BMI.    REVIEW OF SYSTEMS  GENERAL:  Patient denies headache, fevers, chills, night sweats, excessive fatigue, weight loss, dizziness  ALLERGIC/IMMUNOLOGIC: Verified allergies: Yes  EYES:  Patient denies significant visual difficulties, double vision, blurred vision  ENT/MOUTH: Patient denies sore throat, sinus drainage, mouth sores  ENDOCRINE:  Patient denies diabetes, thyroid disease  HEMATOLOGIC/LYMPHATIC: Patient denies easy bruising, bleeding, tender lymph nodes, swollen lymph nodes  BREASTS: Patient denies not reviewed at this time  RESPIRATORY:  Patient denies lung pain with breathing, cough, coughing up blood, shortness of breath  CARDIOVASCULAR:  Patient denies anginal chest pain, palpitations  GASTROINTESTINAL: Patient denies abdominal pain , nausea, vomiting, diarrhea, constipation, heartburn  : Patient denies abnormal genital masses, blood in the urine, frequency, urgency, burning with urination, hesitancy, incontinence, vaginal bleeding, discharge  MUSCULOSKELETAL:  Patient  denies but complains of: back and shoulder pain  SKIN:  Patient denies chronic rashes, inflammation, ulcerations, skin changes, itching  NEUROLOGIC:  Patient denies loss of balance, areas of focal weakness, numbness, tingling  PSYCHIATRIC: Patient denies depression, anxiety, but complains of: insomnia    This patient reported abnormal symptoms that needed immediate verbal communication: No     5 min   Follow up with PMD (Dr. Lockhart)  Follow up with Cardiologist (Dr. Laughlin)  Follow up with GI (dr. Coyne)  Follow up with vascular surgeon (Dr. Parham) for further management of Peripheral arterial disease  Follow up with wound care clinic (Dr. Temple) 1 week after discharge for possible hypobaric therapy Follow up with PMD (Dr. Lockhart)  Follow up with Cardiologist (Dr. Laughlin)  Follow up with GI (dr. Coyne)  Follow up with vascular surgeon (Dr. Parham) for further management of Peripheral arterial disease  Follow up with wound care clinic (Dr. Temple) 1 week after discharge
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
